# Patient Record
Sex: MALE | Race: BLACK OR AFRICAN AMERICAN | NOT HISPANIC OR LATINO | Employment: OTHER | URBAN - METROPOLITAN AREA
[De-identification: names, ages, dates, MRNs, and addresses within clinical notes are randomized per-mention and may not be internally consistent; named-entity substitution may affect disease eponyms.]

---

## 2017-05-06 ENCOUNTER — APPOINTMENT (OUTPATIENT)
Dept: GENERAL RADIOLOGY | Facility: HOSPITAL | Age: 62
End: 2017-05-06

## 2017-05-06 ENCOUNTER — APPOINTMENT (OUTPATIENT)
Dept: CT IMAGING | Facility: HOSPITAL | Age: 62
End: 2017-05-06

## 2017-05-06 ENCOUNTER — HOSPITAL ENCOUNTER (EMERGENCY)
Facility: HOSPITAL | Age: 62
Discharge: HOME | End: 2017-05-06
Attending: EMERGENCY MEDICINE | Admitting: EMERGENCY MEDICINE

## 2017-05-06 VITALS
HEART RATE: 87 BPM | RESPIRATION RATE: 20 BRPM | SYSTOLIC BLOOD PRESSURE: 174 MMHG | DIASTOLIC BLOOD PRESSURE: 92 MMHG | WEIGHT: 175 LBS | BODY MASS INDEX: 23.7 KG/M2 | OXYGEN SATURATION: 98 % | HEIGHT: 72 IN | TEMPERATURE: 98.9 F

## 2017-05-06 DIAGNOSIS — R07.9 ACUTE CHEST PAIN: Primary | ICD-10-CM

## 2017-05-06 DIAGNOSIS — R73.09 ELEVATED GLUCOSE: ICD-10-CM

## 2017-05-06 DIAGNOSIS — R10.12 ACUTE ABDOMINAL PAIN IN LEFT UPPER QUADRANT: ICD-10-CM

## 2017-05-06 DIAGNOSIS — F14.10 COCAINE ABUSE (HCC): ICD-10-CM

## 2017-05-06 DIAGNOSIS — I70.203 ATHEROSCLEROSIS OF ARTERY OF BOTH LOWER EXTREMITIES (HCC): ICD-10-CM

## 2017-05-06 LAB
ALBUMIN SERPL-MCNC: 4.3 G/DL (ref 3.2–4.8)
ALBUMIN/GLOB SERPL: 1.3 G/DL (ref 1.5–2.5)
ALP SERPL-CCNC: 86 U/L (ref 25–100)
ALT SERPL W P-5'-P-CCNC: 19 U/L (ref 7–40)
AMPHET+METHAMPHET UR QL: NEGATIVE
AMPHETAMINES UR QL: NEGATIVE
ANION GAP SERPL CALCULATED.3IONS-SCNC: 5 MMOL/L (ref 3–11)
AST SERPL-CCNC: 13 U/L (ref 0–33)
BACTERIA UR QL AUTO: ABNORMAL /HPF
BARBITURATES UR QL SCN: NEGATIVE
BASOPHILS # BLD AUTO: 0.02 10*3/MM3 (ref 0–0.2)
BASOPHILS NFR BLD AUTO: 0.2 % (ref 0–1)
BENZODIAZ UR QL SCN: NEGATIVE
BILIRUB SERPL-MCNC: 0.6 MG/DL (ref 0.3–1.2)
BILIRUB UR QL STRIP: ABNORMAL
BNP SERPL-MCNC: 13 PG/ML (ref 0–100)
BUN BLD-MCNC: 26 MG/DL (ref 9–23)
BUN/CREAT SERPL: 20 (ref 7–25)
BUPRENORPHINE SERPL-MCNC: NEGATIVE NG/ML
CALCIUM SPEC-SCNC: 10.7 MG/DL (ref 8.7–10.4)
CANNABINOIDS SERPL QL: POSITIVE
CHLORIDE SERPL-SCNC: 98 MMOL/L (ref 99–109)
CLARITY UR: ABNORMAL
CO2 SERPL-SCNC: 33 MMOL/L (ref 20–31)
COCAINE UR QL: POSITIVE
COLOR UR: ABNORMAL
CREAT BLD-MCNC: 1.3 MG/DL (ref 0.6–1.3)
D DIMER PPP FEU-MCNC: <0.19 MG/L (FEU) (ref 0–0.5)
DEPRECATED RDW RBC AUTO: 43.4 FL (ref 37–54)
EOSINOPHIL # BLD AUTO: 0.02 10*3/MM3 (ref 0.1–0.3)
EOSINOPHIL NFR BLD AUTO: 0.2 % (ref 0–3)
ERYTHROCYTE [DISTWIDTH] IN BLOOD BY AUTOMATED COUNT: 12.7 % (ref 11.3–14.5)
GFR SERPL CREATININE-BSD FRML MDRD: 68 ML/MIN/1.73
GLOBULIN UR ELPH-MCNC: 3.4 GM/DL
GLUCOSE BLD-MCNC: 295 MG/DL (ref 70–100)
GLUCOSE UR STRIP-MCNC: ABNORMAL MG/DL
HCT VFR BLD AUTO: 47.4 % (ref 38.9–50.9)
HGB BLD-MCNC: 16.2 G/DL (ref 13.1–17.5)
HGB UR QL STRIP.AUTO: NEGATIVE
HOLD SPECIMEN: NORMAL
HOLD SPECIMEN: NORMAL
HYALINE CASTS UR QL AUTO: ABNORMAL /LPF
IMM GRANULOCYTES # BLD: 0.01 10*3/MM3 (ref 0–0.03)
IMM GRANULOCYTES NFR BLD: 0.1 % (ref 0–0.6)
KETONES UR QL STRIP: ABNORMAL
LEUKOCYTE ESTERASE UR QL STRIP.AUTO: NEGATIVE
LIPASE SERPL-CCNC: 31 U/L (ref 6–51)
LYMPHOCYTES # BLD AUTO: 1.79 10*3/MM3 (ref 0.6–4.8)
LYMPHOCYTES NFR BLD AUTO: 18.9 % (ref 24–44)
MCH RBC QN AUTO: 31.6 PG (ref 27–31)
MCHC RBC AUTO-ENTMCNC: 34.2 G/DL (ref 32–36)
MCV RBC AUTO: 92.4 FL (ref 80–99)
METHADONE UR QL SCN: NEGATIVE
MONOCYTES # BLD AUTO: 0.62 10*3/MM3 (ref 0–1)
MONOCYTES NFR BLD AUTO: 6.6 % (ref 0–12)
MUCOUS THREADS URNS QL MICRO: ABNORMAL /HPF
NEUTROPHILS # BLD AUTO: 7 10*3/MM3 (ref 1.5–8.3)
NEUTROPHILS NFR BLD AUTO: 74 % (ref 41–71)
NITRITE UR QL STRIP: NEGATIVE
OPIATES UR QL: NEGATIVE
OXYCODONE UR QL SCN: NEGATIVE
PCP UR QL SCN: NEGATIVE
PH UR STRIP.AUTO: <=5 [PH] (ref 5–8)
PLATELET # BLD AUTO: 299 10*3/MM3 (ref 150–450)
PMV BLD AUTO: 10.6 FL (ref 6–12)
POTASSIUM BLD-SCNC: 4 MMOL/L (ref 3.5–5.5)
PROPOXYPH UR QL: NEGATIVE
PROT SERPL-MCNC: 7.7 G/DL (ref 5.7–8.2)
PROT UR QL STRIP: ABNORMAL
RBC # BLD AUTO: 5.13 10*6/MM3 (ref 4.2–5.76)
RBC # UR: ABNORMAL /HPF
REF LAB TEST METHOD: ABNORMAL
SODIUM BLD-SCNC: 136 MMOL/L (ref 132–146)
SP GR UR STRIP: 1.04 (ref 1–1.03)
SQUAMOUS #/AREA URNS HPF: ABNORMAL /HPF
TRICYCLICS UR QL SCN: NEGATIVE
TROPONIN I SERPL-MCNC: 0 NG/ML (ref 0–0.07)
TROPONIN I SERPL-MCNC: 0 NG/ML (ref 0–0.07)
UROBILINOGEN UR QL STRIP: ABNORMAL
WBC NRBC COR # BLD: 9.46 10*3/MM3 (ref 3.5–10.8)
WBC UR QL AUTO: ABNORMAL /HPF
WHOLE BLOOD HOLD SPECIMEN: NORMAL
WHOLE BLOOD HOLD SPECIMEN: NORMAL

## 2017-05-06 PROCEDURE — 0 IOPAMIDOL PER 1 ML: Performed by: EMERGENCY MEDICINE

## 2017-05-06 PROCEDURE — 71010 HC CHEST PA OR AP: CPT

## 2017-05-06 PROCEDURE — 71275 CT ANGIOGRAPHY CHEST: CPT

## 2017-05-06 PROCEDURE — 96376 TX/PRO/DX INJ SAME DRUG ADON: CPT

## 2017-05-06 PROCEDURE — 80053 COMPREHEN METABOLIC PANEL: CPT | Performed by: EMERGENCY MEDICINE

## 2017-05-06 PROCEDURE — 85025 COMPLETE CBC W/AUTO DIFF WBC: CPT | Performed by: EMERGENCY MEDICINE

## 2017-05-06 PROCEDURE — 84484 ASSAY OF TROPONIN QUANT: CPT

## 2017-05-06 PROCEDURE — 93005 ELECTROCARDIOGRAM TRACING: CPT | Performed by: EMERGENCY MEDICINE

## 2017-05-06 PROCEDURE — 83880 ASSAY OF NATRIURETIC PEPTIDE: CPT | Performed by: EMERGENCY MEDICINE

## 2017-05-06 PROCEDURE — 74177 CT ABD & PELVIS W/CONTRAST: CPT

## 2017-05-06 PROCEDURE — 81001 URINALYSIS AUTO W/SCOPE: CPT | Performed by: EMERGENCY MEDICINE

## 2017-05-06 PROCEDURE — 96374 THER/PROPH/DIAG INJ IV PUSH: CPT

## 2017-05-06 PROCEDURE — 85379 FIBRIN DEGRADATION QUANT: CPT | Performed by: EMERGENCY MEDICINE

## 2017-05-06 PROCEDURE — 93005 ELECTROCARDIOGRAM TRACING: CPT

## 2017-05-06 PROCEDURE — 0 DIATRIZOATE MEGLUMINE & SODIUM PER 1 ML: Performed by: EMERGENCY MEDICINE

## 2017-05-06 PROCEDURE — 87086 URINE CULTURE/COLONY COUNT: CPT | Performed by: EMERGENCY MEDICINE

## 2017-05-06 PROCEDURE — 80306 DRUG TEST PRSMV INSTRMNT: CPT | Performed by: EMERGENCY MEDICINE

## 2017-05-06 PROCEDURE — 25010000002 ONDANSETRON PER 1 MG: Performed by: EMERGENCY MEDICINE

## 2017-05-06 PROCEDURE — 96375 TX/PRO/DX INJ NEW DRUG ADDON: CPT

## 2017-05-06 PROCEDURE — 99284 EMERGENCY DEPT VISIT MOD MDM: CPT

## 2017-05-06 PROCEDURE — 25010000002 HYDROMORPHONE PER 4 MG: Performed by: EMERGENCY MEDICINE

## 2017-05-06 PROCEDURE — 83690 ASSAY OF LIPASE: CPT | Performed by: EMERGENCY MEDICINE

## 2017-05-06 RX ORDER — HYDROMORPHONE HYDROCHLORIDE 1 MG/ML
0.5 INJECTION, SOLUTION INTRAMUSCULAR; INTRAVENOUS; SUBCUTANEOUS
Status: DISCONTINUED | OUTPATIENT
Start: 2017-05-06 | End: 2017-05-06 | Stop reason: HOSPADM

## 2017-05-06 RX ORDER — HYDROMORPHONE HYDROCHLORIDE 1 MG/ML
0.5 INJECTION, SOLUTION INTRAMUSCULAR; INTRAVENOUS; SUBCUTANEOUS ONCE
Status: COMPLETED | OUTPATIENT
Start: 2017-05-06 | End: 2017-05-06

## 2017-05-06 RX ORDER — FAMOTIDINE 10 MG/ML
20 INJECTION, SOLUTION INTRAVENOUS ONCE
Status: COMPLETED | OUTPATIENT
Start: 2017-05-06 | End: 2017-05-06

## 2017-05-06 RX ORDER — ASPIRIN 81 MG/1
81 TABLET ORAL DAILY
Qty: 30 TABLET | Refills: 0 | Status: ON HOLD | OUTPATIENT
Start: 2017-05-06 | End: 2022-10-15

## 2017-05-06 RX ORDER — ONDANSETRON 2 MG/ML
4 INJECTION INTRAMUSCULAR; INTRAVENOUS ONCE
Status: COMPLETED | OUTPATIENT
Start: 2017-05-06 | End: 2017-05-06

## 2017-05-06 RX ORDER — ASPIRIN 81 MG/1
324 TABLET, CHEWABLE ORAL ONCE
Status: DISCONTINUED | OUTPATIENT
Start: 2017-05-06 | End: 2017-05-06 | Stop reason: HOSPADM

## 2017-05-06 RX ORDER — DICYCLOMINE HYDROCHLORIDE 10 MG/1
10 CAPSULE ORAL
Qty: 12 CAPSULE | Refills: 0 | Status: SHIPPED | OUTPATIENT
Start: 2017-05-06 | End: 2022-04-29

## 2017-05-06 RX ORDER — OMEPRAZOLE 20 MG/1
20 CAPSULE, DELAYED RELEASE ORAL DAILY
Qty: 30 CAPSULE | Refills: 0 | Status: SHIPPED | OUTPATIENT
Start: 2017-05-06 | End: 2022-04-29

## 2017-05-06 RX ORDER — PROMETHAZINE HYDROCHLORIDE 25 MG/1
25 TABLET ORAL EVERY 8 HOURS PRN
Qty: 15 TABLET | Refills: 0 | Status: SHIPPED | OUTPATIENT
Start: 2017-05-06 | End: 2022-04-29

## 2017-05-06 RX ORDER — SODIUM CHLORIDE 0.9 % (FLUSH) 0.9 %
10 SYRINGE (ML) INJECTION AS NEEDED
Status: DISCONTINUED | OUTPATIENT
Start: 2017-05-06 | End: 2017-05-06 | Stop reason: HOSPADM

## 2017-05-06 RX ADMIN — FAMOTIDINE 20 MG: 10 INJECTION, SOLUTION INTRAVENOUS at 17:06

## 2017-05-06 RX ADMIN — HYDROMORPHONE HYDROCHLORIDE 0.5 MG: 1 INJECTION, SOLUTION INTRAMUSCULAR; INTRAVENOUS; SUBCUTANEOUS at 17:57

## 2017-05-06 RX ADMIN — SODIUM CHLORIDE 2000 ML: 9 INJECTION, SOLUTION INTRAVENOUS at 17:06

## 2017-05-06 RX ADMIN — HYDROMORPHONE HYDROCHLORIDE 0.5 MG: 1 INJECTION, SOLUTION INTRAMUSCULAR; INTRAVENOUS; SUBCUTANEOUS at 17:08

## 2017-05-06 RX ADMIN — HYDROMORPHONE HYDROCHLORIDE 0.5 MG: 1 INJECTION, SOLUTION INTRAMUSCULAR; INTRAVENOUS; SUBCUTANEOUS at 20:53

## 2017-05-06 RX ADMIN — ONDANSETRON 4 MG: 2 INJECTION INTRAMUSCULAR; INTRAVENOUS at 17:08

## 2017-05-06 RX ADMIN — IOPAMIDOL 95 ML: 755 INJECTION, SOLUTION INTRAVENOUS at 19:21

## 2017-05-06 RX ADMIN — DIATRIZOATE MEGLUMINE AND DIATRIZOATE SODIUM 120 ML: 660; 100 LIQUID ORAL; RECTAL at 17:35

## 2017-05-08 LAB — BACTERIA SPEC AEROBE CULT: NORMAL

## 2017-05-10 ENCOUNTER — OFFICE VISIT (OUTPATIENT)
Dept: CARDIOLOGY | Facility: HOSPITAL | Age: 62
End: 2017-05-10

## 2017-05-10 ENCOUNTER — HOSPITAL ENCOUNTER (EMERGENCY)
Facility: HOSPITAL | Age: 62
Discharge: HOME | End: 2017-05-10
Attending: EMERGENCY MEDICINE | Admitting: EMERGENCY MEDICINE

## 2017-05-10 ENCOUNTER — APPOINTMENT (OUTPATIENT)
Dept: GENERAL RADIOLOGY | Facility: HOSPITAL | Age: 62
End: 2017-05-10

## 2017-05-10 VITALS
DIASTOLIC BLOOD PRESSURE: 62 MMHG | BODY MASS INDEX: 19.91 KG/M2 | HEART RATE: 89 BPM | TEMPERATURE: 98.1 F | OXYGEN SATURATION: 97 % | WEIGHT: 147 LBS | RESPIRATION RATE: 18 BRPM | HEIGHT: 72 IN | SYSTOLIC BLOOD PRESSURE: 196 MMHG

## 2017-05-10 VITALS
HEIGHT: 72 IN | HEART RATE: 104 BPM | RESPIRATION RATE: 20 BRPM | SYSTOLIC BLOOD PRESSURE: 74 MMHG | TEMPERATURE: 97 F | DIASTOLIC BLOOD PRESSURE: 53 MMHG | OXYGEN SATURATION: 97 % | BODY MASS INDEX: 19.91 KG/M2 | WEIGHT: 147 LBS

## 2017-05-10 DIAGNOSIS — R07.9 NONSPECIFIC CHEST PAIN: Primary | ICD-10-CM

## 2017-05-10 DIAGNOSIS — I95.1 ORTHOSTATIC HYPOTENSION: ICD-10-CM

## 2017-05-10 DIAGNOSIS — R07.2 PRECORDIAL PAIN: Primary | ICD-10-CM

## 2017-05-10 DIAGNOSIS — E86.0 DEHYDRATION: ICD-10-CM

## 2017-05-10 DIAGNOSIS — F14.90 COCAINE USE: ICD-10-CM

## 2017-05-10 DIAGNOSIS — IMO0001 ELEVATED BLOOD PRESSURE: ICD-10-CM

## 2017-05-10 DIAGNOSIS — IMO0002 UNCONTROLLED TYPE 2 DIABETES MELLITUS WITH MILD NONPROLIFERATIVE RETINOPATHY AND MACULAR EDEMA, WITHOUT LONG-TERM CURRENT USE OF INSULIN: ICD-10-CM

## 2017-05-10 PROBLEM — R63.4 WEIGHT LOSS, UNINTENTIONAL: Status: ACTIVE | Noted: 2017-05-10

## 2017-05-10 PROBLEM — R11.0 NAUSEA: Status: ACTIVE | Noted: 2017-05-10

## 2017-05-10 LAB
ALBUMIN SERPL-MCNC: 4.3 G/DL (ref 3.2–4.8)
ALBUMIN/GLOB SERPL: 1.3 G/DL (ref 1.5–2.5)
ALP SERPL-CCNC: 78 U/L (ref 25–100)
ALT SERPL W P-5'-P-CCNC: 16 U/L (ref 7–40)
AMPHET+METHAMPHET UR QL: NEGATIVE
AMPHETAMINES UR QL: NEGATIVE
ANION GAP SERPL CALCULATED.3IONS-SCNC: 2 MMOL/L (ref 3–11)
AST SERPL-CCNC: 12 U/L (ref 0–33)
BARBITURATES UR QL SCN: NEGATIVE
BASOPHILS # BLD AUTO: 0.02 10*3/MM3 (ref 0–0.2)
BASOPHILS NFR BLD AUTO: 0.2 % (ref 0–1)
BENZODIAZ UR QL SCN: NEGATIVE
BILIRUB SERPL-MCNC: 0.6 MG/DL (ref 0.3–1.2)
BNP SERPL-MCNC: 9 PG/ML (ref 0–100)
BUN BLD-MCNC: 21 MG/DL (ref 9–23)
BUN/CREAT SERPL: 16.2 (ref 7–25)
BUPRENORPHINE SERPL-MCNC: NEGATIVE NG/ML
CALCIUM SPEC-SCNC: 10 MG/DL (ref 8.7–10.4)
CANNABINOIDS SERPL QL: POSITIVE
CHLORIDE SERPL-SCNC: 98 MMOL/L (ref 99–109)
CO2 SERPL-SCNC: 36 MMOL/L (ref 20–31)
COCAINE UR QL: POSITIVE
CREAT BLD-MCNC: 1.3 MG/DL (ref 0.6–1.3)
DEPRECATED RDW RBC AUTO: 40.7 FL (ref 37–54)
EOSINOPHIL # BLD AUTO: 0.02 10*3/MM3 (ref 0.1–0.3)
EOSINOPHIL NFR BLD AUTO: 0.2 % (ref 0–3)
ERYTHROCYTE [DISTWIDTH] IN BLOOD BY AUTOMATED COUNT: 12.3 % (ref 11.3–14.5)
ETHANOL BLD-MCNC: <10 MG/DL (ref 0–10)
GFR SERPL CREATININE-BSD FRML MDRD: 68 ML/MIN/1.73
GLOBULIN UR ELPH-MCNC: 3.4 GM/DL
GLUCOSE BLD-MCNC: 284 MG/DL (ref 70–100)
HCT VFR BLD AUTO: 46.5 % (ref 38.9–50.9)
HGB BLD-MCNC: 16.3 G/DL (ref 13.1–17.5)
HOLD SPECIMEN: NORMAL
HOLD SPECIMEN: NORMAL
IMM GRANULOCYTES # BLD: 0.01 10*3/MM3 (ref 0–0.03)
IMM GRANULOCYTES NFR BLD: 0.1 % (ref 0–0.6)
LIPASE SERPL-CCNC: 28 U/L (ref 6–51)
LYMPHOCYTES # BLD AUTO: 1.85 10*3/MM3 (ref 0.6–4.8)
LYMPHOCYTES NFR BLD AUTO: 21.2 % (ref 24–44)
MCH RBC QN AUTO: 31.5 PG (ref 27–31)
MCHC RBC AUTO-ENTMCNC: 35.1 G/DL (ref 32–36)
MCV RBC AUTO: 89.9 FL (ref 80–99)
METHADONE UR QL SCN: NEGATIVE
MONOCYTES # BLD AUTO: 0.52 10*3/MM3 (ref 0–1)
MONOCYTES NFR BLD AUTO: 6 % (ref 0–12)
NEUTROPHILS # BLD AUTO: 6.31 10*3/MM3 (ref 1.5–8.3)
NEUTROPHILS NFR BLD AUTO: 72.3 % (ref 41–71)
OPIATES UR QL: NEGATIVE
OXYCODONE UR QL SCN: NEGATIVE
PCP UR QL SCN: NEGATIVE
PLATELET # BLD AUTO: 280 10*3/MM3 (ref 150–450)
PMV BLD AUTO: 10.5 FL (ref 6–12)
POTASSIUM BLD-SCNC: 4 MMOL/L (ref 3.5–5.5)
PROPOXYPH UR QL: NEGATIVE
PROT SERPL-MCNC: 7.7 G/DL (ref 5.7–8.2)
RBC # BLD AUTO: 5.17 10*6/MM3 (ref 4.2–5.76)
SODIUM BLD-SCNC: 136 MMOL/L (ref 132–146)
TRICYCLICS UR QL SCN: NEGATIVE
TROPONIN I SERPL-MCNC: 0 NG/ML (ref 0–0.07)
TROPONIN I SERPL-MCNC: 0.01 NG/ML (ref 0–0.07)
WBC NRBC COR # BLD: 8.73 10*3/MM3 (ref 3.5–10.8)
WHOLE BLOOD HOLD SPECIMEN: NORMAL
WHOLE BLOOD HOLD SPECIMEN: NORMAL

## 2017-05-10 PROCEDURE — 80053 COMPREHEN METABOLIC PANEL: CPT | Performed by: EMERGENCY MEDICINE

## 2017-05-10 PROCEDURE — 99284 EMERGENCY DEPT VISIT MOD MDM: CPT

## 2017-05-10 PROCEDURE — 96361 HYDRATE IV INFUSION ADD-ON: CPT

## 2017-05-10 PROCEDURE — 93005 ELECTROCARDIOGRAM TRACING: CPT | Performed by: EMERGENCY MEDICINE

## 2017-05-10 PROCEDURE — 85025 COMPLETE CBC W/AUTO DIFF WBC: CPT | Performed by: EMERGENCY MEDICINE

## 2017-05-10 PROCEDURE — 96374 THER/PROPH/DIAG INJ IV PUSH: CPT

## 2017-05-10 PROCEDURE — 83880 ASSAY OF NATRIURETIC PEPTIDE: CPT | Performed by: EMERGENCY MEDICINE

## 2017-05-10 PROCEDURE — 71020 HC CHEST PA AND LATERAL: CPT

## 2017-05-10 PROCEDURE — 80307 DRUG TEST PRSMV CHEM ANLYZR: CPT | Performed by: EMERGENCY MEDICINE

## 2017-05-10 PROCEDURE — 83690 ASSAY OF LIPASE: CPT | Performed by: EMERGENCY MEDICINE

## 2017-05-10 PROCEDURE — 80306 DRUG TEST PRSMV INSTRMNT: CPT | Performed by: EMERGENCY MEDICINE

## 2017-05-10 PROCEDURE — 99213 OFFICE O/P EST LOW 20 MIN: CPT | Performed by: NURSE PRACTITIONER

## 2017-05-10 PROCEDURE — 84484 ASSAY OF TROPONIN QUANT: CPT

## 2017-05-10 PROCEDURE — 93005 ELECTROCARDIOGRAM TRACING: CPT

## 2017-05-10 RX ORDER — ASPIRIN 81 MG/1
324 TABLET, CHEWABLE ORAL ONCE
Status: DISCONTINUED | OUTPATIENT
Start: 2017-05-10 | End: 2017-05-10 | Stop reason: HOSPADM

## 2017-05-10 RX ORDER — FAMOTIDINE 20 MG/1
20 TABLET, FILM COATED ORAL DAILY
Qty: 30 TABLET | Refills: 0 | Status: SHIPPED | OUTPATIENT
Start: 2017-05-10 | End: 2022-04-29

## 2017-05-10 RX ORDER — ONDANSETRON 4 MG/1
4 TABLET, ORALLY DISINTEGRATING ORAL EVERY 4 HOURS
Qty: 15 TABLET | Refills: 0 | Status: SHIPPED | OUTPATIENT
Start: 2017-05-10 | End: 2022-04-29

## 2017-05-10 RX ORDER — FAMOTIDINE 10 MG/ML
20 INJECTION, SOLUTION INTRAVENOUS ONCE
Status: COMPLETED | OUTPATIENT
Start: 2017-05-10 | End: 2017-05-10

## 2017-05-10 RX ORDER — SODIUM CHLORIDE 0.9 % (FLUSH) 0.9 %
10 SYRINGE (ML) INJECTION AS NEEDED
Status: DISCONTINUED | OUTPATIENT
Start: 2017-05-10 | End: 2017-05-10 | Stop reason: HOSPADM

## 2017-05-10 RX ORDER — ONDANSETRON 2 MG/ML
4 INJECTION INTRAMUSCULAR; INTRAVENOUS ONCE
Status: DISCONTINUED | OUTPATIENT
Start: 2017-05-10 | End: 2017-05-10 | Stop reason: HOSPADM

## 2017-05-10 RX ORDER — MAGNESIUM HYDROXIDE/ALUMINUM HYDROXICE/SIMETHICONE 120; 1200; 1200 MG/30ML; MG/30ML; MG/30ML
30 SUSPENSION ORAL ONCE
Status: COMPLETED | OUTPATIENT
Start: 2017-05-10 | End: 2017-05-10

## 2017-05-10 RX ADMIN — FAMOTIDINE 20 MG: 10 INJECTION, SOLUTION INTRAVENOUS at 13:35

## 2017-05-10 RX ADMIN — ALUMINUM HYDROXIDE, MAGNESIUM HYDROXIDE, AND SIMETHICONE 30 ML: 200; 200; 20 SUSPENSION ORAL at 13:35

## 2017-05-10 RX ADMIN — LIDOCAINE HYDROCHLORIDE 15 ML: 20 SOLUTION ORAL; TOPICAL at 13:35

## 2017-05-10 RX ADMIN — SODIUM CHLORIDE 1000 ML: 9 INJECTION, SOLUTION INTRAVENOUS at 16:29

## 2017-05-23 ENCOUNTER — PROCEDURE VISIT (OUTPATIENT)
Dept: CARDIOLOGY | Facility: HOSPITAL | Age: 62
End: 2017-05-23

## 2017-05-23 ENCOUNTER — OFFICE VISIT (OUTPATIENT)
Dept: CARDIOLOGY | Facility: HOSPITAL | Age: 62
End: 2017-05-23

## 2017-05-23 ENCOUNTER — HOSPITAL ENCOUNTER (OUTPATIENT)
Dept: CARDIOLOGY | Facility: HOSPITAL | Age: 62
Discharge: HOME | End: 2017-05-23
Admitting: NURSE PRACTITIONER

## 2017-05-23 VITALS
RESPIRATION RATE: 18 BRPM | HEIGHT: 72 IN | BODY MASS INDEX: 21.26 KG/M2 | SYSTOLIC BLOOD PRESSURE: 147 MMHG | TEMPERATURE: 97.7 F | HEART RATE: 77 BPM | WEIGHT: 157 LBS | DIASTOLIC BLOOD PRESSURE: 86 MMHG | OXYGEN SATURATION: 100 %

## 2017-05-23 DIAGNOSIS — R07.89 CHEST PAIN, ATYPICAL: Primary | ICD-10-CM

## 2017-05-23 DIAGNOSIS — R07.89 CHEST PAIN, ATYPICAL: ICD-10-CM

## 2017-05-23 DIAGNOSIS — R06.02 SHORTNESS OF BREATH: ICD-10-CM

## 2017-05-23 LAB
BH CV STRESS BP STAGE 1: NORMAL
BH CV STRESS BP STAGE 2: NORMAL
BH CV STRESS DURATION MIN STAGE 1: 3
BH CV STRESS DURATION MIN STAGE 2: 3
BH CV STRESS DURATION SEC STAGE 1: 0
BH CV STRESS DURATION SEC STAGE 2: 0
BH CV STRESS GRADE STAGE 1: 10
BH CV STRESS GRADE STAGE 2: 12
BH CV STRESS HR STAGE 1: 114
BH CV STRESS HR STAGE 2: 141
BH CV STRESS METS STAGE 1: 5
BH CV STRESS METS STAGE 2: 7.5
BH CV STRESS PROTOCOL 1: NORMAL
BH CV STRESS RECOVERY BP: NORMAL MMHG
BH CV STRESS RECOVERY HR: 96 BPM
BH CV STRESS SPEED STAGE 1: 1.7
BH CV STRESS SPEED STAGE 2: 2.5
BH CV STRESS STAGE 1: 1
BH CV STRESS STAGE 2: 2
MAXIMAL PREDICTED HEART RATE: 161 BPM
PERCENT MAX PREDICTED HR: 87.58 %
STRESS BASELINE BP: NORMAL MMHG
STRESS BASELINE HR: 73 BPM
STRESS PERCENT HR: 103 %
STRESS POST ESTIMATED WORKLOAD: 7 METS
STRESS POST EXERCISE DUR MIN: 6 MIN
STRESS POST EXERCISE DUR SEC: 0 SEC
STRESS POST PEAK BP: NORMAL MMHG
STRESS POST PEAK HR: 141 BPM
STRESS TARGET HR: 137 BPM

## 2017-05-23 PROCEDURE — 93017 CV STRESS TEST TRACING ONLY: CPT

## 2017-05-23 PROCEDURE — 93005 ELECTROCARDIOGRAM TRACING: CPT

## 2017-05-23 PROCEDURE — 99213 OFFICE O/P EST LOW 20 MIN: CPT | Performed by: NURSE PRACTITIONER

## 2017-05-23 PROCEDURE — 93018 CV STRESS TEST I&R ONLY: CPT | Performed by: INTERNAL MEDICINE

## 2022-04-29 ENCOUNTER — HOSPITAL ENCOUNTER (EMERGENCY)
Facility: HOSPITAL | Age: 67
Discharge: HOME | End: 2022-04-29
Attending: EMERGENCY MEDICINE | Admitting: EMERGENCY MEDICINE

## 2022-04-29 ENCOUNTER — APPOINTMENT (OUTPATIENT)
Dept: GENERAL RADIOLOGY | Facility: HOSPITAL | Age: 67
End: 2022-04-29

## 2022-04-29 ENCOUNTER — APPOINTMENT (OUTPATIENT)
Dept: MRI IMAGING | Facility: HOSPITAL | Age: 67
End: 2022-04-29

## 2022-04-29 VITALS
OXYGEN SATURATION: 99 % | RESPIRATION RATE: 16 BRPM | BODY MASS INDEX: 23.84 KG/M2 | DIASTOLIC BLOOD PRESSURE: 75 MMHG | SYSTOLIC BLOOD PRESSURE: 142 MMHG | TEMPERATURE: 97.9 F | HEIGHT: 72 IN | WEIGHT: 176 LBS | HEART RATE: 82 BPM

## 2022-04-29 DIAGNOSIS — S06.0X0A CONCUSSION WITHOUT LOSS OF CONSCIOUSNESS, INITIAL ENCOUNTER: Primary | ICD-10-CM

## 2022-04-29 DIAGNOSIS — R19.7 DIARRHEA, UNSPECIFIED TYPE: ICD-10-CM

## 2022-04-29 DIAGNOSIS — S09.90XA MINOR HEAD INJURY WITHOUT LOSS OF CONSCIOUSNESS, INITIAL ENCOUNTER: ICD-10-CM

## 2022-04-29 LAB
ALBUMIN SERPL-MCNC: 3.9 G/DL (ref 3.5–5.2)
ALBUMIN/GLOB SERPL: 1.1 G/DL
ALP SERPL-CCNC: 99 U/L (ref 39–117)
ALT SERPL W P-5'-P-CCNC: 21 U/L (ref 1–41)
ANION GAP SERPL CALCULATED.3IONS-SCNC: 9 MMOL/L (ref 5–15)
AST SERPL-CCNC: 19 U/L (ref 1–40)
BASOPHILS # BLD AUTO: 0.04 10*3/MM3 (ref 0–0.2)
BASOPHILS NFR BLD AUTO: 0.5 % (ref 0–1.5)
BILIRUB SERPL-MCNC: <0.2 MG/DL (ref 0–1.2)
BILIRUB UR QL STRIP: NEGATIVE
BUN SERPL-MCNC: 23 MG/DL (ref 8–23)
BUN/CREAT SERPL: 23.7 (ref 7–25)
CALCIUM SPEC-SCNC: 9.5 MG/DL (ref 8.6–10.5)
CHLORIDE SERPL-SCNC: 103 MMOL/L (ref 98–107)
CLARITY UR: CLEAR
CO2 SERPL-SCNC: 27 MMOL/L (ref 22–29)
COLOR UR: YELLOW
CREAT SERPL-MCNC: 0.97 MG/DL (ref 0.76–1.27)
DEPRECATED RDW RBC AUTO: 41.3 FL (ref 37–54)
EGFRCR SERPLBLD CKD-EPI 2021: 87.2 ML/MIN/1.73
EOSINOPHIL # BLD AUTO: 0.2 10*3/MM3 (ref 0–0.4)
EOSINOPHIL NFR BLD AUTO: 2.7 % (ref 0.3–6.2)
ERYTHROCYTE [DISTWIDTH] IN BLOOD BY AUTOMATED COUNT: 13 % (ref 12.3–15.4)
GLOBULIN UR ELPH-MCNC: 3.6 GM/DL
GLUCOSE SERPL-MCNC: 106 MG/DL (ref 65–99)
GLUCOSE UR STRIP-MCNC: NEGATIVE MG/DL
HCT VFR BLD AUTO: 35.2 % (ref 37.5–51)
HGB BLD-MCNC: 11.7 G/DL (ref 13–17.7)
HGB UR QL STRIP.AUTO: NEGATIVE
HOLD SPECIMEN: NORMAL
IMM GRANULOCYTES # BLD AUTO: 0.02 10*3/MM3 (ref 0–0.05)
IMM GRANULOCYTES NFR BLD AUTO: 0.3 % (ref 0–0.5)
KETONES UR QL STRIP: NEGATIVE
LEUKOCYTE ESTERASE UR QL STRIP.AUTO: NEGATIVE
LYMPHOCYTES # BLD AUTO: 2.65 10*3/MM3 (ref 0.7–3.1)
LYMPHOCYTES NFR BLD AUTO: 36.3 % (ref 19.6–45.3)
MAGNESIUM SERPL-MCNC: 2 MG/DL (ref 1.6–2.4)
MCH RBC QN AUTO: 28.9 PG (ref 26.6–33)
MCHC RBC AUTO-ENTMCNC: 33.2 G/DL (ref 31.5–35.7)
MCV RBC AUTO: 86.9 FL (ref 79–97)
MONOCYTES # BLD AUTO: 0.51 10*3/MM3 (ref 0.1–0.9)
MONOCYTES NFR BLD AUTO: 7 % (ref 5–12)
NEUTROPHILS NFR BLD AUTO: 3.88 10*3/MM3 (ref 1.7–7)
NEUTROPHILS NFR BLD AUTO: 53.2 % (ref 42.7–76)
NITRITE UR QL STRIP: NEGATIVE
NRBC BLD AUTO-RTO: 0 /100 WBC (ref 0–0.2)
PH UR STRIP.AUTO: <=5 [PH] (ref 5–8)
PLATELET # BLD AUTO: 264 10*3/MM3 (ref 140–450)
PMV BLD AUTO: 10.5 FL (ref 6–12)
POTASSIUM SERPL-SCNC: 4.2 MMOL/L (ref 3.5–5.2)
PROT SERPL-MCNC: 7.5 G/DL (ref 6–8.5)
PROT UR QL STRIP: ABNORMAL
QT INTERVAL: 328 MS
QTC INTERVAL: 396 MS
RBC # BLD AUTO: 4.05 10*6/MM3 (ref 4.14–5.8)
SODIUM SERPL-SCNC: 139 MMOL/L (ref 136–145)
SP GR UR STRIP: 1.02 (ref 1–1.03)
TROPONIN T SERPL-MCNC: 0.02 NG/ML (ref 0–0.03)
UROBILINOGEN UR QL STRIP: ABNORMAL
WBC NRBC COR # BLD: 7.3 10*3/MM3 (ref 3.4–10.8)
WHOLE BLOOD HOLD SPECIMEN: NORMAL
WHOLE BLOOD HOLD SPECIMEN: NORMAL

## 2022-04-29 PROCEDURE — 93005 ELECTROCARDIOGRAM TRACING: CPT | Performed by: EMERGENCY MEDICINE

## 2022-04-29 PROCEDURE — 93005 ELECTROCARDIOGRAM TRACING: CPT

## 2022-04-29 PROCEDURE — 84484 ASSAY OF TROPONIN QUANT: CPT

## 2022-04-29 PROCEDURE — 83735 ASSAY OF MAGNESIUM: CPT

## 2022-04-29 PROCEDURE — 70551 MRI BRAIN STEM W/O DYE: CPT

## 2022-04-29 PROCEDURE — 80053 COMPREHEN METABOLIC PANEL: CPT

## 2022-04-29 PROCEDURE — 36415 COLL VENOUS BLD VENIPUNCTURE: CPT

## 2022-04-29 PROCEDURE — 85025 COMPLETE CBC W/AUTO DIFF WBC: CPT

## 2022-04-29 PROCEDURE — 71045 X-RAY EXAM CHEST 1 VIEW: CPT

## 2022-04-29 PROCEDURE — 99284 EMERGENCY DEPT VISIT MOD MDM: CPT

## 2022-04-29 PROCEDURE — 70200 X-RAY EXAM OF EYE SOCKETS: CPT

## 2022-04-29 PROCEDURE — 81003 URINALYSIS AUTO W/O SCOPE: CPT | Performed by: EMERGENCY MEDICINE

## 2022-04-29 RX ORDER — SODIUM CHLORIDE 0.9 % (FLUSH) 0.9 %
10 SYRINGE (ML) INJECTION AS NEEDED
Status: DISCONTINUED | OUTPATIENT
Start: 2022-04-29 | End: 2022-04-29 | Stop reason: HOSPADM

## 2022-04-29 RX ORDER — LISINOPRIL AND HYDROCHLOROTHIAZIDE 20; 12.5 MG/1; MG/1
2 TABLET ORAL DAILY
Status: ON HOLD | COMMUNITY
End: 2022-10-15

## 2022-04-29 RX ADMIN — SODIUM CHLORIDE 500 ML: 9 INJECTION, SOLUTION INTRAVENOUS at 15:51

## 2022-09-17 ENCOUNTER — APPOINTMENT (OUTPATIENT)
Dept: GENERAL RADIOLOGY | Age: 67
End: 2022-09-17
Payer: MEDICARE

## 2022-09-17 ENCOUNTER — HOSPITAL ENCOUNTER (EMERGENCY)
Age: 67
Discharge: OTHER INSTITUTION | End: 2022-09-17
Attending: STUDENT IN AN ORGANIZED HEALTH CARE EDUCATION/TRAINING PROGRAM
Payer: MEDICARE

## 2022-09-17 VITALS
OXYGEN SATURATION: 100 % | TEMPERATURE: 98.8 F | DIASTOLIC BLOOD PRESSURE: 77 MMHG | RESPIRATION RATE: 16 BRPM | SYSTOLIC BLOOD PRESSURE: 159 MMHG | HEART RATE: 72 BPM

## 2022-09-17 DIAGNOSIS — S99.922A TOE INJURY, LEFT, INITIAL ENCOUNTER: Primary | ICD-10-CM

## 2022-09-17 PROCEDURE — 90715 TDAP VACCINE 7 YRS/> IM: CPT | Performed by: STUDENT IN AN ORGANIZED HEALTH CARE EDUCATION/TRAINING PROGRAM

## 2022-09-17 PROCEDURE — 90471 IMMUNIZATION ADMIN: CPT | Performed by: STUDENT IN AN ORGANIZED HEALTH CARE EDUCATION/TRAINING PROGRAM

## 2022-09-17 PROCEDURE — 96365 THER/PROPH/DIAG IV INF INIT: CPT

## 2022-09-17 PROCEDURE — 2580000003 HC RX 258: Performed by: STUDENT IN AN ORGANIZED HEALTH CARE EDUCATION/TRAINING PROGRAM

## 2022-09-17 PROCEDURE — 99284 EMERGENCY DEPT VISIT MOD MDM: CPT

## 2022-09-17 PROCEDURE — 2500000003 HC RX 250 WO HCPCS

## 2022-09-17 PROCEDURE — 6360000002 HC RX W HCPCS: Performed by: STUDENT IN AN ORGANIZED HEALTH CARE EDUCATION/TRAINING PROGRAM

## 2022-09-17 PROCEDURE — 96372 THER/PROPH/DIAG INJ SC/IM: CPT

## 2022-09-17 PROCEDURE — 73620 X-RAY EXAM OF FOOT: CPT

## 2022-09-17 RX ORDER — HYDROCODONE BITARTRATE AND ACETAMINOPHEN 5; 325 MG/1; MG/1
1 TABLET ORAL EVERY 6 HOURS PRN
Status: CANCELLED | OUTPATIENT
Start: 2022-09-17 | End: 2022-09-24

## 2022-09-17 RX ORDER — CEPHALEXIN 500 MG/1
500 CAPSULE ORAL EVERY 6 HOURS SCHEDULED
Status: CANCELLED | OUTPATIENT
Start: 2022-09-18 | End: 2022-09-24

## 2022-09-17 RX ORDER — CEPHALEXIN 500 MG/1
500 CAPSULE ORAL 4 TIMES DAILY
Qty: 28 CAPSULE | Refills: 0 | Status: SHIPPED | OUTPATIENT
Start: 2022-09-17 | End: 2022-09-24

## 2022-09-17 RX ADMIN — TETANUS TOXOID, REDUCED DIPHTHERIA TOXOID AND ACELLULAR PERTUSSIS VACCINE, ADSORBED 0.5 ML: 5; 2.5; 8; 8; 2.5 SUSPENSION INTRAMUSCULAR at 21:35

## 2022-09-17 RX ADMIN — CEFAZOLIN 2000 MG: 2 INJECTION, POWDER, FOR SOLUTION INTRAMUSCULAR; INTRAVENOUS at 21:34

## 2022-09-17 RX ADMIN — LIDOCAINE HYDROCHLORIDE 5 ML: 10 INJECTION, SOLUTION EPIDURAL; INFILTRATION; INTRACAUDAL; PERINEURAL at 21:11

## 2022-09-17 ASSESSMENT — ENCOUNTER SYMPTOMS
RHINORRHEA: 0
COUGH: 0
CONSTIPATION: 0
CHOKING: 0
CHEST TIGHTNESS: 0
SHORTNESS OF BREATH: 0
BACK PAIN: 0
SINUS PAIN: 0
SINUS PRESSURE: 0
ABDOMINAL PAIN: 0
VOMITING: 0
FACIAL SWELLING: 0
SORE THROAT: 0
NAUSEA: 0

## 2022-09-17 NOTE — ED PROVIDER NOTES
1 [facility]  EMERGENCY DEPARTMENT ENCOUNTER          [facility] RESIDENT NOTE       Date of evaluation: 9/17/2022    Chief Complaint     Toe Injury (Pt brought in by EMS from [facility] for injury to toe on left foot. Per EMS, pt is at mental baseline and is not sure what he injured toe on but has no other complaints. Pt sent by nursing home staff for possible stitches)      History of Present Illness     Shantel Bauer is a 74 y.o. male who presents from [facility] for left total injury. Per patient he was running across the street where he fell flops when his left foot got stuck around the curb and injured his left toe. He went back to the facility where the staff noted him to be bleeding and sent him to the ED for further evaluation and management. Patient denies any head trauma, loss of consciousness, nausea/vomiting, lightheadedness or dizziness. Patient is a from [facility] facility, I tried to reach out to facility but there was no answer on multiple attempts. I was unable to find any significant past medical history or any previous charts in the EMR. Review of Systems     Review of Systems   Constitutional:  Negative for activity change, appetite change, chills, diaphoresis, fatigue and fever. HENT:  Negative for facial swelling, hearing loss, postnasal drip, rhinorrhea, sinus pressure, sinus pain, sneezing and sore throat. Respiratory:  Negative for cough, choking, chest tightness and shortness of breath. Gastrointestinal:  Negative for abdominal pain, constipation, nausea and vomiting. Genitourinary:  Negative for flank pain, frequency, genital sores, hematuria, penile discharge and penile swelling. Musculoskeletal:  Negative for arthralgias, back pain, gait problem, joint swelling, myalgias and neck pain. Neurological:  Negative for tremors, seizures, speech difficulty, weakness, light-headedness, numbness and headaches.      Past Medical, Surgical, Family, and Social History     He has no past medical history on file. He has no past surgical history on file. His family history is not on file. He     Medications     Discharge Medication List as of 9/17/2022 10:28 PM          Allergies     He has No Known Allergies. Physical Exam     INITIAL VITALS: BP: (!) 146/62, Temp: 98.8 °F (37.1 °C), Heart Rate: 74, Resp: 16, SpO2: 95 %   Physical Exam    Diagnostic Results     EKG   Not performed. RADIOLOGY:  XR FOOT LEFT (2 VIEWS)   Final Result      1. Small intra-articular vertical fracture through the base of the great toe proximal phalanx. LABS:   No results found for this visit on 09/17/22. ED BEDSIDE ULTRASOUND:  No results found. RECENT VITALS:  BP: (!) 159/77, Temp: 98.8 °F (37.1 °C),Heart Rate: 72, Resp: 16, SpO2: 100 %     Procedures     Sutures per podiatry, please see podiatry residents note. ED Course     Nursing Notes, Past Medical Hx, Past Surgical Hx, Social Hx, Allergies, and FamilyHx were reviewed. The patient was giventhe following medications:  Orders Placed This Encounter   Medications    Tetanus-Diphth-Acell Pertussis (BOOSTRIX) injection 0.5 mL    lidocaine 1 % injection 5 mL    ceFAZolin (ANCEF) 2,000 mg in sodium chloride 0.9 % 50 mL IVPB (mini-bag)     Order Specific Question:   Antimicrobial Indications     Answer:   Skin and Soft Tissue Infection    cephALEXin (KEFLEX) 500 MG capsule     Sig: Take 1 capsule by mouth 4 times daily for 7 days     Dispense:  28 capsule     Refill:  0       CONSULTS:  IP CONSULT TO [address] / ASSESSMENT / Roula Martinez is a 74 y.o. male with no significant past medical history in EMR presented from [facility] with a toe injury. Patient reported he was running fast with his flip-flops on and got his left foot stuck with his flip-flop and injured his toe. Patient was hemodynamically stable in the ED.   On my assessment there is significant injury of the space between the first and second toes on his left foot as well as the injury to L big toe. XR L foot revealed small intra-articular ventricular fracture through the base of the greater toe proximal phalanx. We consulted podiatry. One of the podiatry residents came down to the ED and sutured patients L foot injury. I gave a dose of ancef 2gm. Also ordered a hard sole shoe and discharged patient with a 7 day prescription of keflex. Pt was given written instruction per podiatry regarding the care of injured site as well as given written instructions to follow up with a podiatry physician outpatient and return to the ED if worsening symptoms of pain, bleeding, suture tear. This patient was also evaluated by the attending physician. All care plans were discussed and agreed upon. Clinical Impression     1.  Toe injury, left, initial encounter        Disposition     PATIENT REFERRED TO:  Juvenal Fofana DPM  [address]  [address] [facility] [address]  [phone]    In 1 week  For suture removal, For wound re-check    DISCHARGE MEDICATIONS:  Discharge Medication List as of 9/17/2022 10:28 PM        START taking these medications    Details   cephALEXin (KEFLEX) 500 MG capsule Take 1 capsule by mouth 4 times daily for 7 days, Disp-28 capsule, R-0Print             DISPOSITION Decision To Discharge 09/17/2022 10:35:36 PM       Jose Eduardo Weber MD  Resident  09/17/22 9930

## 2022-09-18 NOTE — CONSULTS
Department of Podiatry Consult Note  Resident       Reason for Consult: Laceration left lower extremity  Requesting Physician: Amparo Alston MD    CHIEF COMPLAINT: Laceration left lower extremity    HISTORY OF PRESENT ILLNESS:                The patient is a 74 y.o. male with significant past medical history as listed below. Podiatry was consulted for laceration left lower extremity to the first webspace. Patient relates that he tripped while walking today around noon. Patient denies pain to the left lower extremity. Patient did not notice injury initially, facility noticed injury and patient was brought by EMS. Patient has a history of diabetic neuropathy relates numbness to the bilateral lower extremity. Patient does not recall last tetanus shot. patient denies fever, chills, nausea, vomiting, shortness of breath, chest pain. Patient has no other pedal complaints at this time. Past Medical History:    No past medical history on file. Past Surgical History:    No past surgical history on file. Allergies:   Patient has no known allergies. Medications:   Home Meds  No current facility-administered medications on file prior to encounter. No current outpatient medications on file prior to encounter. Current Meds  No current facility-administered medications for this encounter. Family History:   No family history on file. Social History:   TOBACCO:   has no history on file for tobacco use. ETOH:   has no history on file for alcohol use. DRUGS:   has no history on file for drug use. REVIEW OF SYSTEMS:    As above. PHYSICAL EXAM:      Vitals:    BP (!) 146/62   Pulse 74   Temp 98.8 °F (37.1 °C) (Oral)   SpO2 95%     LABS:   No results for input(s): WBC, HGB, HCT, PLT in the last 72 hours. No results for input(s): NA, K, CL, CO2, PHOS, BUN, CREATININE, CA in the last 72 hours. No results for input(s): PROT, INR, APTT in the last 72 hours.       VASCULAR: DP and PT pulses faintly palpable 1/4, upon hand-held Doppler examination DP and PT pulses were noted to be biphasic bilaterally. CFT is brisk to the digits of the foot b/l. Skin temperature is warm to cool from proximal to distal with no focal calor noted. Moderate nonpitting edema noted dorsal aspect left lower extremity localized to the first MPJ and second MPJ left lower extremity. No pain with calf compression b/l. NEUROLOGIC: Gross and epicritic sensation is diminished b/l. Protective sensation is absent at all pedal sites b/l. DERMATOLOGIC: Nails 1-5 b/l are within normal limits of length, thickness, and color. Webspaces 1-4 right are clean, dry, and intact. Webspaces 2 through 4 are clean dry intact left lower extremity, first webspace with laceration. No hyperkeratosis noted. . No subcutaneous nodules, rashes, or other skin lesions noted. Right lower extremity:  Verbal consent obtained prior to photos taken        Full-thickness laceration extending from the dorsal MPJ through the first webspace plantarly to the central aspect of the sulcus first digit left lower extremity. Diffuse ecchymosis noted dorsal aspect to the medial interphalangeal joint hallux left lower extremity and PIPJ second digit dorsally left lower extremity. Sanguinous drainage noted to laceration left lower extremity without fluctuance, crepitus, malodor. No purulence noted. Lesion does not tunnel, track but does probe to bone. Moderate erythema noted plantar aspect of left first and second digit. Right lower extremity is a closed soft tissue envelope without evidence of bone laceration or infection. .    MUSCULOSKELETAL: Muscle strength is 5/5 for all pedal groups tested. No pain with palpation of the foot or ankle b/l. Ankle joint ROM is decreased in dorsiflexion with the knee extended. No obvious biomechanical abnormalities. No pain on palpation to the first and second left digits.   Range of motion appreciated to all digits at the metatarsal phalangeal joints left lower extremity. Imaging:    Narrative   4 VIEWS OF THE LEFT FOOT       HISTORY: Toe injury       FINDINGS: There is intra-articular fracture through the lateral base of the great toe proximal phalanx. Joint spaces are maintained. Remaining bones of the left foot are intact. Soft tissue swelling of the foot seen. Impression       1. Small intra-articular vertical fracture through the base of the great toe proximal phalanx. IMPRESSION/RECOMMENDATIONS:    -Laceration first webspace left lower extremity  -Intra-articular fracture lateral base of first proximal phalanx left lower extremity    -Patient examined and evaluated at the bedside   -Hypertensive otherwise VSS. -Labs pending  -Imaging reviewed and impressions noted above  -After obtaining verbal consent, 10 cc of 1% lidocaine plain utilized in a [place] block fashion. The left lower extremity was then prepped in a sterile fashion. Using copious amounts of saline the laceration was irrigated thoroughly. Utilizing 3-0 nylon in was reapproximated using simple interrupted suture technique. -Dressing was applied to the left lower extremity consisting of Adaptic, Betadine, gauze, Kerlix, Ace.  -Surgical shoe ordered and to be worn at all times.  -Patient may partial weight-bear to the heel only in surgical shoe left lower extremity, patient may weight-bear as tolerated to the right lower extremity.  -2 g Ancef given in ED  -Patient to be sent home with 7-day course of p.o. Keflex.  -Tetanus booster administered in the ED.   -Patient to follow-up with Dr. Deshawn Cano in his private office within 1 week of discharge      DISPO: Laceration first webspace left lower extremity. Intra-articular fracture base of proximal phalanx left lower extremity. IV Ancef. Outpatient p.o. Keflex.   No surgical intervention warranted at this time, patient okay to discharge from podiatric standpoint pending medical clearance.     - The patient will be staffed with Dr. Patsy Ace, Elena Torres DPM   Podiatric Resident PGY1  Pager [phone] or Abdi  9/17/2022, 10:23 PM

## 2022-09-18 NOTE — DISCHARGE INSTRUCTIONS
[facility] [address]   [address]  [phone]    Dr. Michael Coulter Operative Instructions    1. Have Prescriptions filled and take as directed. All medications should be taken with food or milk. 2.  Keep foot elevated six inches above the level of the heart. Support feet, legs, and knees with pillows. 3.  KEEP FOOT ELEVATED AS MUCH AS POSSIBLE UNTIL YOUR NEXT VISIT    4. Place an ice pack on the bandaged site for 15 minutes every hour while awake    5. Keep dressing clean, dry, and intact. DO NOT REMOVE DRESSING. CALL THE OFFICE IF BANDAGE COMES OFF. 6.  For the first 7 days after surgery, take temperature by mouth three times a day. Call the office if greater than 101F.    7.  Ambulate with partial weight bearing to the left lower extremity with surgical shoe or crutches / walker. 8.  All instructions are to be followed until otherwise instructed by your surgeon. 9.  Call our office if you have any concerns or questions which arise. Our phones are answered 24 hours a day. [phone]    13. Your first post-operative appointment is scheduled, call the office for appointment date and time if not known prior to today.

## 2022-09-18 NOTE — ED PROVIDER NOTES
ED Attending Attestation Note     Date of evaluation: 9/17/2022    This patient was seen by the resident. I have seen and examined the patient, agree with the workup, evaluation, management and diagnosis. The care plan has been discussed. My assessment reveals a 44-year-old gentleman with no reported medical history presenting by EMS for left foot injury. Per patient, he was running across the street from a friend's house in flip-flops when he struck his left foot on a curb resulting in pain at his left first toe. Patient went back to his living facility and was noted by staff to have bleeding and was sent to the ER for further evaluation. Patient denies falling, hitting his head or having pain anywhere else. Patient is unsure if his tetanus shot is up-to-date. Exam with no focal neurologic deficits on exam with no external signs of trauma to the head, neck, chest, abdomen or pelvis. Exam of left lower extremity showed no proximal fibula tenderness, no tenderness over bilateral malleoli or calcaneus, no tenderness at base of first or fifth metatarsal with linear laceration between the first and second digit with superficial abrasions over dorsal aspect of first toe with surrounding contusion. Patient is able to sense light touch in all nerve distributions, wiggle toes and has good cap refill in all toes with strong DP pulse. Tdap was updated and patient was given ancef. X-ray showed small intra-articular fracture through base of first toe. Podiatry was consulted and repaired laceration at bedside with recommendation for outpatient follow-up and antibiotics. After laceration was repaired at bedside given patient had stable vitals, reassuring exam and no other findings of trauma on exam, he was discharged home with prescription for Keflex, hard soled shoe and outpatient podiatry follow-up. He was advised to return to the ER for new or worsening symptoms.      Ranjit Almodovar MD  09/20/22 3104

## 2022-09-26 ENCOUNTER — HOSPITAL ENCOUNTER (INPATIENT)
Age: 67
LOS: 9 days | Discharge: NURSING, REHAB OR LONG-TERM CARE | DRG: 853 | End: 2022-10-05
Attending: EMERGENCY MEDICINE | Admitting: INTERNAL MEDICINE
Payer: MEDICARE

## 2022-09-26 ENCOUNTER — APPOINTMENT (OUTPATIENT)
Dept: GENERAL RADIOLOGY | Age: 67
DRG: 853 | End: 2022-09-26
Payer: MEDICARE

## 2022-09-26 ENCOUNTER — APPOINTMENT (OUTPATIENT)
Dept: CT IMAGING | Age: 67
DRG: 853 | End: 2022-09-26
Payer: MEDICARE

## 2022-09-26 DIAGNOSIS — L02.612 ABSCESS OF LEFT FOOT: ICD-10-CM

## 2022-09-26 DIAGNOSIS — A49.02 MRSA INFECTION: ICD-10-CM

## 2022-09-26 DIAGNOSIS — I96 GANGRENE OF LEFT FOOT (HCC): ICD-10-CM

## 2022-09-26 DIAGNOSIS — L08.9 WOUND INFECTION, POSTTRAUMATIC: Primary | ICD-10-CM

## 2022-09-26 DIAGNOSIS — T14.8XXA WOUND INFECTION, POSTTRAUMATIC: Primary | ICD-10-CM

## 2022-09-26 LAB
A/G RATIO: 0.9 (ref 1.1–2.2)
ABO/RH: NORMAL
ALBUMIN SERPL-MCNC: 3.6 G/DL (ref 3.4–5)
ALP BLD-CCNC: 99 U/L (ref 40–129)
ALT SERPL-CCNC: 17 U/L (ref 10–40)
ANION GAP SERPL CALCULATED.3IONS-SCNC: 12 MMOL/L (ref 3–16)
ANTIBODY SCREEN: NORMAL
AST SERPL-CCNC: 17 U/L (ref 15–37)
BASOPHILS ABSOLUTE: 0 K/UL (ref 0–0.2)
BASOPHILS RELATIVE PERCENT: 0.2 %
BILIRUB SERPL-MCNC: <0.2 MG/DL (ref 0–1)
BUN BLDV-MCNC: 19 MG/DL (ref 7–20)
C-REACTIVE PROTEIN: 194.6 MG/L (ref 0–5.1)
CALCIUM SERPL-MCNC: 9.2 MG/DL (ref 8.3–10.6)
CHLORIDE BLD-SCNC: 102 MMOL/L (ref 99–110)
CO2: 26 MMOL/L (ref 21–32)
CREAT SERPL-MCNC: 1.1 MG/DL (ref 0.8–1.3)
EKG ATRIAL RATE: 91 BPM
EKG DIAGNOSIS: NORMAL
EKG P AXIS: 50 DEGREES
EKG P-R INTERVAL: 170 MS
EKG Q-T INTERVAL: 324 MS
EKG QRS DURATION: 62 MS
EKG QTC CALCULATION (BAZETT): 398 MS
EKG R AXIS: 19 DEGREES
EKG T AXIS: 32 DEGREES
EKG VENTRICULAR RATE: 91 BPM
EOSINOPHILS ABSOLUTE: 0 K/UL (ref 0–0.6)
EOSINOPHILS RELATIVE PERCENT: 0.3 %
GFR AFRICAN AMERICAN: >60
GFR NON-AFRICAN AMERICAN: >60
GLUCOSE BLD-MCNC: 128 MG/DL (ref 70–99)
GLUCOSE BLD-MCNC: 224 MG/DL (ref 70–99)
HCT VFR BLD CALC: 29.8 % (ref 40.5–52.5)
HEMOGLOBIN: 9.6 G/DL (ref 13.5–17.5)
INR BLD: 1.18 (ref 0.87–1.14)
LACTIC ACID, SEPSIS: 0.9 MMOL/L (ref 0.4–1.9)
LACTIC ACID, SEPSIS: 1 MMOL/L (ref 0.4–1.9)
LYMPHOCYTES ABSOLUTE: 0.9 K/UL (ref 1–5.1)
LYMPHOCYTES RELATIVE PERCENT: 6.7 %
MCH RBC QN AUTO: 28.5 PG (ref 26–34)
MCHC RBC AUTO-ENTMCNC: 32.2 G/DL (ref 31–36)
MCV RBC AUTO: 88.5 FL (ref 80–100)
MONOCYTES ABSOLUTE: 0.9 K/UL (ref 0–1.3)
MONOCYTES RELATIVE PERCENT: 6.7 %
NEUTROPHILS ABSOLUTE: 11.6 K/UL (ref 1.7–7.7)
NEUTROPHILS RELATIVE PERCENT: 86.1 %
PDW BLD-RTO: 14.9 % (ref 12.4–15.4)
PERFORMED ON: ABNORMAL
PLATELET # BLD: 336 K/UL (ref 135–450)
PMV BLD AUTO: 8.1 FL (ref 5–10.5)
POTASSIUM REFLEX MAGNESIUM: 4.1 MMOL/L (ref 3.5–5.1)
PROTHROMBIN TIME: 15 SEC (ref 11.7–14.5)
RBC # BLD: 3.37 M/UL (ref 4.2–5.9)
SEDIMENTATION RATE, ERYTHROCYTE: 90 MM/HR (ref 0–20)
SODIUM BLD-SCNC: 140 MMOL/L (ref 136–145)
TOTAL PROTEIN: 7.5 G/DL (ref 6.4–8.2)
WBC # BLD: 13.5 K/UL (ref 4–11)

## 2022-09-26 PROCEDURE — 73630 X-RAY EXAM OF FOOT: CPT

## 2022-09-26 PROCEDURE — 85652 RBC SED RATE AUTOMATED: CPT

## 2022-09-26 PROCEDURE — 87077 CULTURE AEROBIC IDENTIFY: CPT

## 2022-09-26 PROCEDURE — 96375 TX/PRO/DX INJ NEW DRUG ADDON: CPT

## 2022-09-26 PROCEDURE — 87070 CULTURE OTHR SPECIMN AEROBIC: CPT

## 2022-09-26 PROCEDURE — 96365 THER/PROPH/DIAG IV INF INIT: CPT

## 2022-09-26 PROCEDURE — 36415 COLL VENOUS BLD VENIPUNCTURE: CPT

## 2022-09-26 PROCEDURE — 71046 X-RAY EXAM CHEST 2 VIEWS: CPT

## 2022-09-26 PROCEDURE — 93005 ELECTROCARDIOGRAM TRACING: CPT | Performed by: EMERGENCY MEDICINE

## 2022-09-26 PROCEDURE — 6370000000 HC RX 637 (ALT 250 FOR IP): Performed by: EMERGENCY MEDICINE

## 2022-09-26 PROCEDURE — 73700 CT LOWER EXTREMITY W/O DYE: CPT

## 2022-09-26 PROCEDURE — 85610 PROTHROMBIN TIME: CPT

## 2022-09-26 PROCEDURE — 6370000000 HC RX 637 (ALT 250 FOR IP): Performed by: INTERNAL MEDICINE

## 2022-09-26 PROCEDURE — 1200000000 HC SEMI PRIVATE

## 2022-09-26 PROCEDURE — 99285 EMERGENCY DEPT VISIT HI MDM: CPT

## 2022-09-26 PROCEDURE — 83036 HEMOGLOBIN GLYCOSYLATED A1C: CPT

## 2022-09-26 PROCEDURE — 87186 SC STD MICRODIL/AGAR DIL: CPT

## 2022-09-26 PROCEDURE — 6360000002 HC RX W HCPCS: Performed by: EMERGENCY MEDICINE

## 2022-09-26 PROCEDURE — 86900 BLOOD TYPING SEROLOGIC ABO: CPT

## 2022-09-26 PROCEDURE — 87075 CULTR BACTERIA EXCEPT BLOOD: CPT

## 2022-09-26 PROCEDURE — 86850 RBC ANTIBODY SCREEN: CPT

## 2022-09-26 PROCEDURE — 87040 BLOOD CULTURE FOR BACTERIA: CPT

## 2022-09-26 PROCEDURE — 83605 ASSAY OF LACTIC ACID: CPT

## 2022-09-26 PROCEDURE — 85025 COMPLETE CBC W/AUTO DIFF WBC: CPT

## 2022-09-26 PROCEDURE — 86140 C-REACTIVE PROTEIN: CPT

## 2022-09-26 PROCEDURE — 87205 SMEAR GRAM STAIN: CPT

## 2022-09-26 PROCEDURE — 80053 COMPREHEN METABOLIC PANEL: CPT

## 2022-09-26 PROCEDURE — 2580000003 HC RX 258: Performed by: EMERGENCY MEDICINE

## 2022-09-26 PROCEDURE — 86901 BLOOD TYPING SEROLOGIC RH(D): CPT

## 2022-09-26 RX ORDER — FOLIC ACID 1 MG/1
1 TABLET ORAL DAILY
COMMUNITY

## 2022-09-26 RX ORDER — INSULIN LISPRO 100 [IU]/ML
0-4 INJECTION, SOLUTION INTRAVENOUS; SUBCUTANEOUS NIGHTLY
Status: DISCONTINUED | OUTPATIENT
Start: 2022-09-26 | End: 2022-09-28

## 2022-09-26 RX ORDER — QUETIAPINE FUMARATE 25 MG/1
25 TABLET, FILM COATED ORAL NIGHTLY
COMMUNITY

## 2022-09-26 RX ORDER — 0.9 % SODIUM CHLORIDE 0.9 %
30 INTRAVENOUS SOLUTION INTRAVENOUS ONCE
Status: COMPLETED | OUTPATIENT
Start: 2022-09-26 | End: 2022-09-26

## 2022-09-26 RX ORDER — ACETAMINOPHEN 325 MG/1
650 TABLET ORAL ONCE
Status: COMPLETED | OUTPATIENT
Start: 2022-09-26 | End: 2022-09-26

## 2022-09-26 RX ORDER — OLANZAPINE 5 MG/1
5 TABLET ORAL 2 TIMES DAILY
COMMUNITY

## 2022-09-26 RX ORDER — ACETAMINOPHEN 325 MG/1
650 TABLET ORAL EVERY 8 HOURS PRN
COMMUNITY

## 2022-09-26 RX ORDER — DEXTROSE MONOHYDRATE 100 MG/ML
INJECTION, SOLUTION INTRAVENOUS CONTINUOUS PRN
Status: DISCONTINUED | OUTPATIENT
Start: 2022-09-26 | End: 2022-10-05 | Stop reason: HOSPADM

## 2022-09-26 RX ORDER — INSULIN LISPRO 100 [IU]/ML
0-4 INJECTION, SOLUTION INTRAVENOUS; SUBCUTANEOUS
Status: DISCONTINUED | OUTPATIENT
Start: 2022-09-27 | End: 2022-09-28

## 2022-09-26 RX ORDER — CHOLECALCIFEROL (VITAMIN D3) 125 MCG
3 TABLET ORAL DAILY
COMMUNITY

## 2022-09-26 RX ORDER — FERROUS SULFATE 325(65) MG
325 TABLET ORAL
COMMUNITY

## 2022-09-26 RX ORDER — LANOLIN ALCOHOL/MO/W.PET/CERES
6 CREAM (GRAM) TOPICAL NIGHTLY
COMMUNITY

## 2022-09-26 RX ORDER — INSULIN GLARGINE 100 [IU]/ML
13 INJECTION, SOLUTION SUBCUTANEOUS NIGHTLY
Status: ON HOLD | COMMUNITY
End: 2022-10-04 | Stop reason: HOSPADM

## 2022-09-26 RX ORDER — LISINOPRIL 20 MG/1
20 TABLET ORAL DAILY
Status: ON HOLD | COMMUNITY
End: 2022-10-04 | Stop reason: HOSPADM

## 2022-09-26 RX ADMIN — ACETAMINOPHEN 650 MG: 325 TABLET, FILM COATED ORAL at 14:00

## 2022-09-26 RX ADMIN — SODIUM CHLORIDE 2550 ML: 9 INJECTION, SOLUTION INTRAVENOUS at 13:55

## 2022-09-26 RX ADMIN — CEFEPIME 2000 MG: 2 INJECTION, POWDER, FOR SOLUTION INTRAVENOUS at 13:54

## 2022-09-26 RX ADMIN — VANCOMYCIN HYDROCHLORIDE 2000 MG: 1 INJECTION, POWDER, LYOPHILIZED, FOR SOLUTION INTRAVENOUS at 14:34

## 2022-09-26 RX ADMIN — METOPROLOL TARTRATE 25 MG: 25 TABLET, FILM COATED ORAL at 21:25

## 2022-09-26 ASSESSMENT — PAIN DESCRIPTION - PAIN TYPE: TYPE: ACUTE PAIN

## 2022-09-26 ASSESSMENT — PAIN SCALES - GENERAL: PAINLEVEL_OUTOF10: 8

## 2022-09-26 ASSESSMENT — ENCOUNTER SYMPTOMS
ABDOMINAL PAIN: 0
COUGH: 0
VOMITING: 0
GASTROINTESTINAL NEGATIVE: 1
EYES NEGATIVE: 1
RESPIRATORY NEGATIVE: 1

## 2022-09-26 ASSESSMENT — PAIN DESCRIPTION - ORIENTATION: ORIENTATION: LEFT

## 2022-09-26 ASSESSMENT — PAIN DESCRIPTION - DESCRIPTORS: DESCRIPTORS: ACHING

## 2022-09-26 ASSESSMENT — PAIN DESCRIPTION - FREQUENCY: FREQUENCY: CONTINUOUS

## 2022-09-26 ASSESSMENT — PAIN - FUNCTIONAL ASSESSMENT: PAIN_FUNCTIONAL_ASSESSMENT: 0-10

## 2022-09-26 ASSESSMENT — PAIN DESCRIPTION - LOCATION: LOCATION: FOOT

## 2022-09-26 NOTE — PROGRESS NOTES
Pre-Operative Note  Resident Note     Patient: Ha Cuello     Procedure: Left foot incision and drainage    Clearance for surgery: Yes, per Internal Medicine    Consent: Procedure was discussed at length with patient and patients daughters and all questions were answered to completion, consent obtained from patients daughter and placed in chart     Labs:        Recent Labs     09/26/22  1342   WBC 13.5*   HGB 9.6*   HCT 29.8*   MCV 88.5           Recent Labs     09/26/22  1342      K 4.1      CO2 26   BUN 19   CREATININE 1.1        Recent Labs     09/26/22  1342   AST 17   ALT 17   BILITOT <0.2   ALKPHOS 99      No results for input(s): LIPASE, AMYLASE in the last 72 hours. Recent Labs     09/26/22  1342   PROT 7.5   INR 1.18*      No results for input(s): CKTOTAL, CKMB, CKMBINDEX, TROPONINI in the last 72 hours.     Pre-op Medications:   Current Facility-Administered Medications   Medication Dose Route Frequency Provider Last Rate Last Admin    acetaminophen (TYLENOL) suppository 650 mg  650 mg Rectal Q4H PRN Kavita Jensen MD   650 mg at 09/27/22 0319    cefepime (MAXIPIME) 2000 mg IVPB minibag  2,000 mg IntraVENous Q12H Luis Alberto Sevilla MD 12.5 mL/hr at 09/27/22 0317 2,000 mg at 09/27/22 0317    glucose chewable tablet 16 g  4 tablet Oral PRN Sallie Cuba MD        dextrose bolus 10% 125 mL  125 mL IntraVENous PRN Sallie Cuba MD        Or    dextrose bolus 10% 250 mL  250 mL IntraVENous PRN Sallie Cuba MD        glucagon (rDNA) injection 1 mg  1 mg SubCUTAneous PRN Sallie Cuba MD        dextrose 10 % infusion   IntraVENous Continuous PRN Sallie Cuba MD        insulin lispro (1 Unit Dial) 0-4 Units  0-4 Units SubCUTAneous TID WC Sallie Cuba MD        insulin lispro (1 Unit Dial) 0-4 Units  0-4 Units SubCUTAneous Nightly Sallie Cuba MD        metoprolol tartrate (LOPRESSOR) tablet 25 mg  25 mg Oral BID Sallie Cuba MD   25 mg at 09/26/22 2125    vancomycin (VANCOCIN) 750 mg in dextrose 5 % 250 mL IVPB  750 mg IntraVENous Q12H Luis Alberto Sevilla MD   Stopped at 09/27/22 9900       Diet: Diet NPO Exceptions are: Sips of Water with Meds    CXR:  B/l peribronchial thickening representing bronchitis or asthma. No acute pulmonary consolidation        EKG:  See cardio section of EMR      Echocardiogram: not done    IV access/ saline lock: Yes    Antibiotics: Vancomycin and Cefepime    PT/INR: 15/1.18    Type and Screen: O positive, negative antibody screen    Pregnancy test: Not applicable    Known risk factors for perioperative complications: Diabetes mellitus type II, HTN, and iron deficiency anemia      Anesthesia to see patient.     Madelyn Austin DPM  09/27/22  8:20 AM

## 2022-09-26 NOTE — PLAN OF CARE
Podiatric Surgery Plan of Care Note  Robinson Dandy         Discussed surgical intervention with patient at bedside. All potential risks, benefits, and complications were discussed with the patient prior to the scheduling of surgery. No guarantees or promises were made to what the outcomes will be.  The patient wished to proceed with surgery, and informed written consent was obtained from patients daughter due to patients AMS, signed, and placed on the patient's chart.    -Patient will be NPO at midnight   -PT/INR ordered   -Type and Screen ordered   -Chest Xray ordered   -NWB to [facility]

## 2022-09-26 NOTE — PLAN OF CARE
Problem: Safety - Adult  Goal: Free from fall injury  Outcome: Progressing     Problem: Skin/Tissue Integrity  Goal: Absence of new skin breakdown  Outcome: Progressing     Problem: ABCDS Injury Assessment  Goal: Absence of physical injury  Outcome: Progressing     Patient admitted from ED. He is alert to self, confused to place, situation, time. Patient unable to answer admission questions for me due to mentation. Bed in low position, call light in reach, bed alarm on, will continue to monitor.

## 2022-09-26 NOTE — ED PROVIDER NOTES
[address] [facility]          ATTENDING PHYSICIAN NOTE       Date of evaluation: 9/26/2022    Chief Complaint     Foot Pain (Pt reports he injured left foot last week and it was sutured. Today nurse from [facility] noticed swelling and redness around the foot. Pain 8/10)      History of Present Illness     Segun Garg is a 74 y.o. male who presents to the emergency department from nursing facility for evaluation of concern of wound infection. He had an emergency department visit roughly 10 days ago after an injury to his left great toe associated with a laceration and a fracture. He was evaluated by the podiatry service in the emergency department, who closed the wound, and he was discharged home to the nursing facility on antibiotics. He was found today to be more lethargic than usual, and staff noticed erythema, swelling, and other skin changes during the dressing change, so sent him here for evaluation. He is somewhat lethargic, making thorough conversation difficult. This somewhat limits his history of present illness and review of systems. Review of Systems     Review of Systems   Unable to perform ROS: Mental status change   Constitutional:  Positive for chills and fever. Respiratory:  Negative for cough. Gastrointestinal:  Negative for abdominal pain and vomiting. Past Medical, Surgical, Family, and Social History     He has no past medical history on file. He has no past surgical history on file. His family history is not on file. He reports that he has never smoked. He has never used smokeless tobacco. He reports that he does not currently use alcohol. He reports that he does not use drugs.     Review of documentation from nursing facility shows diabetes, psychiatric disease, high blood pressure    Medications     Previous Medications    ACETAMINOPHEN (TYLENOL) 325 MG TABLET    Take 650 mg by mouth every 6 hours as needed for Pain    ERGOCALCIFEROL (VITAMIN D2) 50 MCG (2000 UT) TABS    Take by mouth    FERROUS SULFATE (IRON 325) 325 (65 FE) MG TABLET    Take 325 mg by mouth daily (with breakfast)    FOLIC ACID (FOLVITE) 1 MG TABLET    Take 1 mg by mouth daily    INSULIN GLARGINE (LANTUS) 100 UNIT/ML INJECTION VIAL    Inject 13 Units into the skin nightly    LISINOPRIL (PRINIVIL;ZESTRIL) 20 MG TABLET    Take 20 mg by mouth daily    MELATONIN 3 MG TABS TABLET    Take 3 mg by mouth daily    METFORMIN (GLUCOPHAGE) 500 MG TABLET    Take 500 mg by mouth 2 times daily (with meals)    OLANZAPINE (ZYPREXA) 5 MG TABLET    Take 5 mg by mouth nightly    QUETIAPINE (SEROQUEL) 25 MG TABLET    Take 25 mg by mouth 2 times daily       Allergies     He has No Known Allergies. Physical Exam     INITIAL VITALS: BP (!) 176/63   Pulse 98   Temp (!) 101.2 °F (38.4 °C) (Oral)   Resp 22   Wt 187 lb 6.4 oz (85 kg)   SpO2 95%    General: Well developed, well nourished male in no acute distress. HEENT: Sclera white, conjunctiva pink, mucous membranes moist and oropharynx clear  Neck: Supple, no meningismus or JVD  Respirations: Unlabored with symmetric chest rise and fall  CV: Tachycardic rate with regular rhythm with strong distal pulses  Abd: Soft without rebound guarding distention or focal tenderness  Musculoskeletal: Except his left foot, he moves all extremities with no signs of orthopedic trauma or edema. Left foot: Soft tissue swelling, erythema, and warmth across the dorsum of the foot, focused at the left great toe. Several sutures intact, with underlying skin change and swelling concerning for purulence, but no spontaneously draining purulence. Dressing was clean dry and intact on arrival.  Skin: Warm and dry with no rashes or lesions. Neuro: Awake but somewhat lethargic with no obvious neurologic deficits. Normal speech and facial symmetry. Psych: Mood and affect are normal.    Diagnostic Results     EKG   Indication: Tachycardia. Interpreted by me.   Normal sinus rhythm, normal axis and intervals, rate 91. No ST segment or T wave changes. Interpretation: Normal sinus rhythm no signs of acute ischemia or infarction. RADIOLOGY:  XR FOOT LEFT (MIN 3 VIEWS)   Final Result   Impression: Increased interval displacement of the previously noted fracture fragment of the great toe. No discrete soft tissue gas.           LABS:   Results for orders placed or performed during the hospital encounter of 09/26/22   CBC with Auto Differential   Result Value Ref Range    WBC 13.5 (H) 4.0 - 11.0 K/uL    RBC 3.37 (L) 4.20 - 5.90 M/uL    Hemoglobin 9.6 (L) 13.5 - 17.5 g/dL    Hematocrit 29.8 (L) 40.5 - 52.5 %    MCV 88.5 80.0 - 100.0 fL    MCH 28.5 26.0 - 34.0 pg    MCHC 32.2 31.0 - 36.0 g/dL    RDW 14.9 12.4 - 15.4 %    Platelets 495 966 - 300 K/uL    MPV 8.1 5.0 - 10.5 fL    Neutrophils % 86.1 %    Lymphocytes % 6.7 %    Monocytes % 6.7 %    Eosinophils % 0.3 %    Basophils % 0.2 %    Neutrophils Absolute 11.6 (H) 1.7 - 7.7 K/uL    Lymphocytes Absolute 0.9 (L) 1.0 - 5.1 K/uL    Monocytes Absolute 0.9 0.0 - 1.3 K/uL    Eosinophils Absolute 0.0 0.0 - 0.6 K/uL    Basophils Absolute 0.0 0.0 - 0.2 K/uL   CMP w/ Reflex to MG   Result Value Ref Range    Sodium 140 136 - 145 mmol/L    Potassium reflex Magnesium 4.1 3.5 - 5.1 mmol/L    Chloride 102 99 - 110 mmol/L    CO2 26 21 - 32 mmol/L    Anion Gap 12 3 - 16    Glucose 128 (H) 70 - 99 mg/dL    BUN 19 7 - 20 mg/dL    Creatinine 1.1 0.8 - 1.3 mg/dL    GFR Non-African American >60 >60    GFR African American >60 >60    Calcium 9.2 8.3 - 10.6 mg/dL    Total Protein 7.5 6.4 - 8.2 g/dL    Albumin 3.6 3.4 - 5.0 g/dL    Albumin/Globulin Ratio 0.9 (L) 1.1 - 2.2    Total Bilirubin <0.2 0.0 - 1.0 mg/dL    Alkaline Phosphatase 99 40 - 129 U/L    ALT 17 10 - 40 U/L    AST 17 15 - 37 U/L   Lactate, Sepsis   Result Value Ref Range    Lactic Acid, Sepsis 1.0 0.4 - 1.9 mmol/L   Sedimentation Rate   Result Value Ref Range    Sed Rate 90 (H) 0 - 20 mm/Hr EKG 12 Lead   Result Value Ref Range    Ventricular Rate 91 BPM    Atrial Rate 91 BPM    P-R Interval 170 ms    QRS Duration 62 ms    Q-T Interval 324 ms    QTc Calculation (Bazett) 398 ms    P Axis 50 degrees    R Axis 19 degrees    T Axis 32 degrees    Diagnosis       EKG performed in ER and to be interpreted by ER physician. Confirmed by MD, ER ([id number]),  Richie Jacobs ([id number]) on 9/26/2022 1:59:44 PM     RECENT VITALS:  BP: (!) 176/63, Temp: (!) 101.2 °F (38.4 °C), Heart Rate: 98, Resp: 22, SpO2: 95 %     Procedures     Suture removal and irrigation done by the podiatry service. By report, roughly 2 cc of purulent material obtained, with wound culture sent. ED Course     Nursing Notes, Past Medical Hx, Past Surgical Hx, Social Hx, Allergies, and Family Hx were reviewed. The patient was given the following medications:  Orders Placed This Encounter   Medications    cefepime (MAXIPIME) 2000 mg IVPB minibag     Order Specific Question:   Antimicrobial Indications     Answer: Other     Order Specific Question:   Other Abx Indication     Answer: Suspected Sepsis of Skin or Soft Tissue Origin    vancomycin (VANCOCIN) 2,000 mg in dextrose 5 % 500 mL IVPB     Order Specific Question:   Antimicrobial Indications     Answer: Other     Order Specific Question:   Other Abx Indication     Answer: Suspected Sepsis of Skin or Soft Tissue Origin    acetaminophen (TYLENOL) tablet 650 mg    0.9 % sodium chloride bolus     Was seen and examined arrival to the treatment area. Nurse notes computerized records were reviewed. Based on overall presentation, sepsis from skin and soft tissue was suspected, with antibiotics and fluids ordered. Podiatry consulted after initial evaluation, with prompt response. They provide wound care as above. CT scan is being obtained to evaluate for deep space or necrotizing infection.   Care is being transitioned to the oncoming provider to follow-up with CT scan results and make ultimate disposition. He will certainly be admitted, but CT scan will determine if he needs operative intervention and if so, the acuity. CONSULTS:  [id number] Yvonne Sheridan / ASSESSMENT /  Henny is a 74 y.o. male presents to the emergency department with fever, mental status change, in the context of an infected laceration and underlying diabetes. Sepsis bundle of care was initiated including broad-spectrum antibiotics and fluids. Lactate is not significant elevated, which is reassuring. He has remained normotensive. Tachycardia has improved. He has received wound care, which provide some source control. There is a possibility of necrotizing or deep space infection, which is being evaluated by CT scan, and may require operative intervention. Otherwise, hemodynamically stable. No other evidence of endorgan dysfunction. He does have mild confusion, which may be related to his fever and active infection. I do not suspect stroke or other intracranial process. Critical Care:  Due to the immediate potential for life-threatening deterioration due to sepsis, I spent 35 minutes providing critical care. This time excludes time spent performing procedures but includes time spent on direct patient care, history retrieval, review of the chart, and discussions with patient, family, and consultant(s). Clinical Impression     1.  Wound infection, posttraumatic        Disposition     DISPOSITION    Admission         Abigail Boss MD  09/26/22 [id number]

## 2022-09-26 NOTE — H&P
Internal Medicine H&P    Date: 2022   Patient: Lauren Gallegos   Patient : 1955     CC: Foot Pain (Pt reports he injured left foot last week and it was sutured. Today nurse from [facility] trace noticed swelling and redness around the foot. Pain 8/10)       HPI: Lauren Gallegos is a 74 y.o. M who presents from [address] [place] [address] for worsening of a recent foot injury. Notably, he was seen on 22 in the ER for an open intra-articular fracture thought the base of the first digit on the left. It was treated at bedside with flushing and repair by podiatry at that time, and the patient was given Ancef in the ER and sent home on Keflex. Patient did not follow up with Dr. Robert Frankel outpatient as he was directed. Patient is Alert and Orientated x 2 (to person, place, but not year) and states he has trouble figuring out words. He is unable to tell me about his PMHx. His daughters were present when Podiatry saw the patient, and reportedly the family states they are unsure if the patient's facility did dressing changes or administered his antibiotics. Patient is a poor historian and is unable to corrobarate taking meds or not. Patient does endorse fevers. Family noticed redness and drainage from his foot wound. Patient complains of pain, even when not moving his foot. In the ED: Podiatry performed a bedside I&D and obtained wound cultures. PMHx:      Diagnosis Date    Altered mental status     Diabetes ([facility]     Hypertension     Iron deficiency anemia     Psychotic disorder with hallucinations ([facility]        PSurgHx:  History reviewed. No pertinent surgical history.      Medication:  Medications Prior to Admission: acetaminophen (TYLENOL) 325 MG tablet, Take 650 mg by mouth every 6 hours as needed for Pain  ferrous sulfate (IRON 325) 325 (65 Fe) MG tablet, Take 325 mg by mouth daily (with breakfast)  folic acid (FOLVITE) 1 MG tablet, Take 1 mg by mouth daily  insulin glargine (LANTUS) 100 UNIT/ML injection vial, Inject 13 Units into the skin nightly  lisinopril (PRINIVIL;ZESTRIL) 20 MG tablet, Take 20 mg by mouth daily  melatonin 3 MG TABS tablet, Take 6 mg by mouth at bedtime  metFORMIN (GLUCOPHAGE) 500 MG tablet, Take 500 mg by mouth 2 times daily (with meals)  OLANZapine (ZYPREXA) 5 MG tablet, Take 5 mg by mouth in the morning and at bedtime  QUEtiapine (SEROQUEL) 25 MG tablet, Take 25 mg by mouth at bedtime  Ergocalciferol (VITAMIN D2) 50 MCG (2000 UT) TABS, Take 3 tablets by mouth daily    Allergies:  Patient has no known allergies. SocHx:  Social History     Socioeconomic History    Marital status: Single     Spouse name: None    Number of children: None    Years of education: None    Highest education level: None   Tobacco Use    Smoking status: Never    Smokeless tobacco: Never   Substance and Sexual Activity    Alcohol use: Not Currently    Drug use: Never        FHx:  History reviewed. No pertinent family history. ROS:  Review of Systems   Constitutional:  Positive for fever. HENT: Negative. Eyes: Negative. Respiratory: Negative. Cardiovascular: Negative. Gastrointestinal: Negative. Genitourinary: Negative. Neurological:  Positive for speech difficulty. Vital Signs:  Patient Vitals for the past 8 hrs:   BP Temp Temp src Pulse Resp SpO2 Weight   09/26/22 1732 (!) 146/72 (!) 100.5 °F (38.1 °C) Oral 92 16 95 % --   09/26/22 1630 (!) 140/64 -- -- 97 16 95 % --   09/26/22 1600 (!) 146/67 -- -- 90 19 94 % --   09/26/22 1500 (!) 149/58 -- -- 87 16 96 % --   09/26/22 1430 (!) 176/63 -- -- 98 22 95 % --   09/26/22 1400 (!) 159/76 -- -- 92 20 95 % --   09/26/22 1329 -- -- -- -- -- -- 187 lb 6.4 oz (85 kg)   09/26/22 1323 (!) 132/57 (!) 101.2 °F (38.4 °C) Oral 89 18 -- --       Physical Exam:  Physical Exam  Eyes:      Extraocular Movements: Extraocular movements intact. Cardiovascular:      Rate and Rhythm: Normal rate and regular rhythm.    Pulmonary:      Effort: Pulmonary effort is normal.      Breath sounds: Normal breath sounds. Abdominal:      General: Abdomen is flat. Palpations: Abdomen is soft. Musculoskeletal:         General: Signs of injury present. Cervical back: Normal range of motion. Skin:     General: Skin is warm and dry. Neurological:      Mental Status: He is alert. Comments: Oriented to person and place, not year          Labs:  CBC:   Recent Labs     09/26/22  1342   WBC 13.5*   HGB 9.6*   HCT 29.8*          BMP:   Recent Labs     09/26/22  1342      K 4.1      CO2 26   BUN 19   CREATININE 1.1   GLUCOSE 128*     Magnesium: No results for input(s): MG in the last 72 hours. LFT's:   Recent Labs     09/26/22  1342   AST 17   ALT 17   BILITOT <0.2   ALKPHOS 99       Troponin: No results for input(s): TROPONINI in the last 72 hours. Lactic acid: No results for input(s): LACTATE in the last 72 hours. BNP: No results for input(s): BNP in the last 72 hours. Pro-BNP: No results for input(s): PROBNP in the last 72 hours. Procalcitonin: No results for input(s): PROCAL in the last 72 hours. CRP: No results for input(s): CRP in the last 72 hours. ESR: No results for input(s): ESR in the last 72 hours. ABGs: No results for input(s): PHART, EGJ2PHJ, PO2ART in the last 72 hours. VBGs: No results for input(s): PHVEN, ERB4OQT, PO2VEN in the last 72 hours. INR:   Recent Labs     09/26/22  1342   INR 1.18*       COVID-19: No results for input(s): COVID19 in the last 72 hours. U/A: No results for input(s): NITRITE, COLORU, PHUR, LABCAST, WBCUA, RBCUA, MUCUS, TRICHOMONAS, YEAST, BACTERIA, CLARITYU, SPECGRAV, LEUKOCYTESUR, UROBILINOGEN, BILIRUBINUR, BLOODU, GLUCOSEU, KETONES, AMORPHOUS in the last 72 hours. Radiology:  CT FOOT LEFT WO CONTRAST   Final Result   Impression: Small locules of soft tissue gas adjacent to the major fracture fragment of the left great toe.  Correlation for open fracture versus soft tissue

## 2022-09-26 NOTE — CONSULTS
Department of Podiatry Consult Note  Resident       Reason for Consult: Left Foot infection  Requesting Physician:  Dr Donna Franklin: Left hallux infection    HISTORY OF PRESENT ILLNESS:                The patient is a 74 y.o. male with significant past medical history as listed below. Podiatry was consulted for left foot infection. A majority of the history was provided by both of his daughters. On 9/17/22 patient presented to [facility] emergency room with an open intra-articular fracture through the base of the first digit on the left. After laceration was thoroughly flushed at bedside, the laceration was repaired and patient was discharged home on 7-day Keflex and asked to follow-up with Dr. Uyen NARANJOPRAMBO. outpatient. Patient did not follow-up with Dr. Nubia Vicente. His daughters state that they believe that his facility did not change the dressing since the initial injury. Patient's daughters question if he took the full course of Keflex. He was found today to be more lethargic than usual and staff noticed erythema, swelling, and other skin changes during his dressing change today. Patient was then sent to the [facility] for further evaluation. When asked about his compliance with his weightbearing status, patient could not remember. Patient endorses fever and chills. Patient specifically denies nausea and vomiting, however his daughters state he complained of nausea. Patient's daughters state that they noticed large amounts of purulent drainage from the wound. Patient denies shortness of breath, chest pain. Patient has no other pedal complaints at this time. Past Medical History:    History reviewed. No pertinent past medical history. Past Surgical History:    History reviewed. No pertinent surgical history. Allergies:   Patient has no known allergies. Medications:   Home Meds  No current facility-administered medications on file prior to encounter.      Current Outpatient Medications on File Prior to Encounter   Medication Sig Dispense Refill    acetaminophen (TYLENOL) 325 MG tablet Take 650 mg by mouth every 6 hours as needed for Pain      ferrous sulfate (IRON 325) 325 (65 Fe) MG tablet Take 325 mg by mouth daily (with breakfast)      folic acid (FOLVITE) 1 MG tablet Take 1 mg by mouth daily      insulin glargine (LANTUS) 100 UNIT/ML injection vial Inject 13 Units into the skin nightly      lisinopril (PRINIVIL;ZESTRIL) 20 MG tablet Take 20 mg by mouth daily      melatonin 3 MG TABS tablet Take 3 mg by mouth daily      metFORMIN (GLUCOPHAGE) 500 MG tablet Take 500 mg by mouth 2 times daily (with meals)      OLANZapine (ZYPREXA) 5 MG tablet Take 5 mg by mouth nightly      QUEtiapine (SEROQUEL) 25 MG tablet Take 25 mg by mouth 2 times daily      Ergocalciferol (VITAMIN D2) 50 MCG (2000 UT) TABS Take by mouth         Current Meds  vancomycin (VANCOCIN) 2,000 mg in dextrose 5 % 500 mL IVPB, Once  0.9 % sodium chloride bolus, Once        Family History:   History reviewed. No pertinent family history. Social History:   TOBACCO:   reports that he has never smoked. He has never used smokeless tobacco.  ETOH:   reports that he does not currently use alcohol. DRUGS:   reports no history of drug use. REVIEW OF SYSTEMS:    As above. PHYSICAL EXAM:      Vitals:    BP (!) 176/63   Pulse 87   Temp (!) 101.2 °F (38.4 °C) (Oral)   Resp 22   Wt 187 lb 6.4 oz (85 kg)   SpO2 96%     LABS:   Recent Labs     09/26/22  1342   WBC 13.5*   HGB 9.6*   HCT 29.8*        Recent Labs     09/26/22  1342      K 4.1      CO2 26   BUN 19   CREATININE 1.1     Recent Labs     09/26/22  1342   PROT 7.5         VASCULAR: DP and PT pulses are faintly palpable +1/4 b/l. Upon hand-held Doppler examination, DP and PT pulses were noted to be biphasic bilateral. CFT is brisk to the digits of the foot b/l. Skin temperature is warm to cool from proximal to distal on right LE. Skin temperature is warm to warm from proximal to distal on left with focal calor noted diffusely to the left forefoot. Edema noted diffusely to the left forefoot especially at the left hallux. Erythema noted to the left forefoot that tracks proximally until the level of the distal shin. No pain with calf compression b/l. NEUROLOGIC: Gross and epicritic sensation is diminished b/l. Protective sensation is absent at all pedal sites b/l. DERMATOLOGIC:   Left LE:  Full-thickness incision was noted on the lateral and plantar aspect of the hallux along the sulcus. Sutures remained intact. Upon removal of the sutures, approximately 2 cc of purulent drainage was expressed. Serious fluid filled bullae was appreciated at the medial aspect of the left first metatarsal.  Dusky skin changes were noted to the medial, dorsal and plantar aspect of the hallux. Gangrenous changes noted to the hallux. Fluctuance and crepitus noted at the dorsal aspect of the hallucal IPJ, medial aspect of the first MPJ, and plantar aspect of the hallux proximally to the level of the first MPJ. Malodor was noted. Wound does not tunnel or track. Wound does not probe to bone. Verbal consent was obtained for photos today:      MUSCULOSKELETAL: Muscle strength is 5/5 for all pedal groups tested. Mild pain to palpation noted to the left hallux. Ankle joint ROM is decreased in dorsiflexion with the knee extended. No obvious biomechanical abnormalities. IMAGING:  Narrative   XR FOOT LEFT (MIN 3 VIEWS)       Indication: infection, eval for air        COMPARISON: 9/17/2022       Findings: 3 views of the left foot were obtained. Again identified is a fracture of the proximal phalanx of the great toe. There is increased interval displacement of the fracture fragment with rotation laterally. No additional fracture deformity. No    soft tissue gas or acute abnormality.    Impression   Impression: Increased interval displacement of the previously noted fracture fragment of the great toe. No discrete soft tissue gas. Narrative   CT FOOT LEFT WO CONTRAST       Indication: Left hallux infection       Technique: Helical CT images of the left foot were acquired without the administration of intravenous contrast media. Axial, coronal and sagittal reformatted images were constructed from the raw data set. Individualized dose optimization technique was    used in order to meet ALARA standards for radiation dose reduction. In addition to vendor specific dose reduction algorithms, the dose reduction techniques vary based on the specific scanner utilized but frequently include automated exposure control,    adjustment of the mA and/or kV according to patient size, and use of iterative reconstruction technique. COMPARISON: 9/17/2022. Findings: Again identified is fracture at the lateral aspect of the proximal phalanx of the left great toe. Small locules of gas are visualized adjacent to the fracture fragment. Adjacent soft tissue swelling of the left great toe is noted. Bandage    material overlies the plantar aspect of the great toe. No additional fracture deformity. No organized or drainable fluid collection. Impression   Impression: Small locules of soft tissue gas adjacent to the major fracture fragment of the left great toe. Correlation for open fracture versus soft tissue infection recommended. Procedure:  Bedside I&D  Discussed bedside intervention with patient. All potential risks, benefits, and complications were discussed with the patient. The patient wished to proceed and informed verbal consent was obtained. At this time, attention was directed to the patients left foot where the screws were removed overlying the full-thickness ulceration at the lateral and plantar aspect of the left hallux. Approximately 2 cc of purulence was expressed.   Various fascial planes were explored utilizing scissors and no further purulence was expressed. A wound culture was obtained to be sent for microbiology culture and sensitivity. The wound was irrigated copiously with normal sterile saline spiked with Betadine. Upon completion of this hemostasis was achieved with direct pressure. Once hemostasis was achieved the wound was gently packed with Iodoform 1/2 inch iodoform packing material.  A soft sterile dressing was then applied consisting of Adaptic, Betadine, dry gauze, DSD, and Ace. The patient tolerated the procedure well with vital signs remaining stable and vascular status intact to the left foot following the procedure. IMPRESSION/RECOMMENDATIONS:      -Left foot infection, hallux 2/2 traumatic open fracture    -Positive SIRS criteria  -Previous open intra-articular fracture at the base of the proximal phalanx of the hallux; Left (8/17/22)  -Diabetes mellitus type 2    -Patient examined and evaluated at the bedside   -Patient febrile (101.2) and hypertensive to 176/63 otherwise all VSS. Leukocytosis noted (wbc 13.5). -ESR 90 CRP pending   -X-ray and CT scan reviewed, impressions noted above  -Wound culture pending  -Bedside I&D was performed, procedure detail as noted above  -Full-thickness wound was packed with 0.5 inch iodoform packing and dressed with Adaptic, Betadine, sterile gauze, DSD, Ace  -Patient will be admitted to the hospital for IV antibiotics and podiatric surgical intervention  -Plan for podiatric surgical intervention tomorrow for incision and drainage, primary team please risk stratify  -Continue broad-spectrum IV antibiotics: Vancomycin and cefepime  -Patient will be nonweightbearing to left LE      DISPO: Left hallux infection 2/2 traumatic open fracture. Previous open intra-articular fracture at the base of the proximal phalanx. All labs and imaging reviewed. Continue broad-spectrum IV antibiotics. Patient will be NWB to LLE. Wound culture pending.   Plan for podiatric surgical intervention tomorrow, primary team please risk stratify.     - The patient will be staffed with Dr. Bjorn Martin, Alicia Ohkristi, [facility]  09/26/22  3:16 PM

## 2022-09-26 NOTE — ED PROVIDER NOTES
Patient was turned over to me by day physician. The podiatry team was actively involved in patient's care. Podiatry call me with CT results to let me know that they would be having patient admitted to the hospitalist and they will be taking the patient to the OR tomorrow for cleanout. I placed a call to the hospitalist for admission.     Wound infection    Domingo admit and antibiotic     Bj Dunbar MD  09/26/22 4731

## 2022-09-27 ENCOUNTER — ANESTHESIA (OUTPATIENT)
Dept: OPERATING ROOM | Age: 67
DRG: 853 | End: 2022-09-27
Payer: MEDICARE

## 2022-09-27 ENCOUNTER — ANESTHESIA EVENT (OUTPATIENT)
Dept: OPERATING ROOM | Age: 67
DRG: 853 | End: 2022-09-27
Payer: MEDICARE

## 2022-09-27 PROBLEM — S92.412B: Status: ACTIVE | Noted: 2022-09-27

## 2022-09-27 PROBLEM — A49.02 MRSA INFECTION: Status: ACTIVE | Noted: 2022-09-27

## 2022-09-27 PROBLEM — L02.612 ABSCESS OF LEFT FOOT: Status: ACTIVE | Noted: 2022-09-27

## 2022-09-27 LAB
ANION GAP SERPL CALCULATED.3IONS-SCNC: 8 MMOL/L (ref 3–16)
BASOPHILS ABSOLUTE: 0.1 K/UL (ref 0–0.2)
BASOPHILS RELATIVE PERCENT: 0.4 %
BUN BLDV-MCNC: 14 MG/DL (ref 7–20)
CALCIUM SERPL-MCNC: 8.7 MG/DL (ref 8.3–10.6)
CHLORIDE BLD-SCNC: 101 MMOL/L (ref 99–110)
CO2: 26 MMOL/L (ref 21–32)
CREAT SERPL-MCNC: 0.9 MG/DL (ref 0.8–1.3)
EOSINOPHILS ABSOLUTE: 0.2 K/UL (ref 0–0.6)
EOSINOPHILS RELATIVE PERCENT: 1.5 %
ESTIMATED AVERAGE GLUCOSE: 131.2 MG/DL
FERRITIN: 530.6 NG/ML (ref 30–400)
GFR AFRICAN AMERICAN: >60
GFR NON-AFRICAN AMERICAN: >60
GLUCOSE BLD-MCNC: 137 MG/DL (ref 70–99)
GLUCOSE BLD-MCNC: 140 MG/DL (ref 70–99)
GLUCOSE BLD-MCNC: 212 MG/DL (ref 70–99)
GLUCOSE BLD-MCNC: 228 MG/DL (ref 70–99)
GLUCOSE BLD-MCNC: 234 MG/DL (ref 70–99)
GLUCOSE BLD-MCNC: 291 MG/DL (ref 70–99)
HBA1C MFR BLD: 6.2 %
HCT VFR BLD CALC: 28.8 % (ref 40.5–52.5)
HEMOGLOBIN: 9.5 G/DL (ref 13.5–17.5)
LYMPHOCYTES ABSOLUTE: 0.9 K/UL (ref 1–5.1)
LYMPHOCYTES RELATIVE PERCENT: 7.3 %
MCH RBC QN AUTO: 29.1 PG (ref 26–34)
MCHC RBC AUTO-ENTMCNC: 33 G/DL (ref 31–36)
MCV RBC AUTO: 88.1 FL (ref 80–100)
MONOCYTES ABSOLUTE: 0.7 K/UL (ref 0–1.3)
MONOCYTES RELATIVE PERCENT: 5.3 %
NEUTROPHILS ABSOLUTE: 10.7 K/UL (ref 1.7–7.7)
NEUTROPHILS RELATIVE PERCENT: 85.5 %
PDW BLD-RTO: 14.5 % (ref 12.4–15.4)
PERFORMED ON: ABNORMAL
PLATELET # BLD: 355 K/UL (ref 135–450)
PMV BLD AUTO: 8.5 FL (ref 5–10.5)
POTASSIUM SERPL-SCNC: 3.9 MMOL/L (ref 3.5–5.1)
RBC # BLD: 3.27 M/UL (ref 4.2–5.9)
SODIUM BLD-SCNC: 135 MMOL/L (ref 136–145)
WBC # BLD: 12.5 K/UL (ref 4–11)

## 2022-09-27 PROCEDURE — 80048 BASIC METABOLIC PNL TOTAL CA: CPT

## 2022-09-27 PROCEDURE — 6370000000 HC RX 637 (ALT 250 FOR IP)

## 2022-09-27 PROCEDURE — 2580000003 HC RX 258

## 2022-09-27 PROCEDURE — 99223 1ST HOSP IP/OBS HIGH 75: CPT | Performed by: INTERNAL MEDICINE

## 2022-09-27 PROCEDURE — 6360000002 HC RX W HCPCS: Performed by: NURSE ANESTHETIST, CERTIFIED REGISTERED

## 2022-09-27 PROCEDURE — 0JBR0ZZ EXCISION OF LEFT FOOT SUBCUTANEOUS TISSUE AND FASCIA, OPEN APPROACH: ICD-10-PCS | Performed by: PODIATRIST

## 2022-09-27 PROCEDURE — 1200000000 HC SEMI PRIVATE

## 2022-09-27 PROCEDURE — 3600000004 HC SURGERY LEVEL 4 BASE: Performed by: PODIATRIST

## 2022-09-27 PROCEDURE — 87206 SMEAR FLUORESCENT/ACID STAI: CPT

## 2022-09-27 PROCEDURE — 83540 ASSAY OF IRON: CPT

## 2022-09-27 PROCEDURE — 2500000003 HC RX 250 WO HCPCS: Performed by: PODIATRIST

## 2022-09-27 PROCEDURE — 7100000000 HC PACU RECOVERY - FIRST 15 MIN: Performed by: PODIATRIST

## 2022-09-27 PROCEDURE — 87070 CULTURE OTHR SPECIMN AEROBIC: CPT

## 2022-09-27 PROCEDURE — 87077 CULTURE AEROBIC IDENTIFY: CPT

## 2022-09-27 PROCEDURE — 94761 N-INVAS EAR/PLS OXIMETRY MLT: CPT

## 2022-09-27 PROCEDURE — 3700000000 HC ANESTHESIA ATTENDED CARE: Performed by: PODIATRIST

## 2022-09-27 PROCEDURE — 3700000001 HC ADD 15 MINUTES (ANESTHESIA): Performed by: PODIATRIST

## 2022-09-27 PROCEDURE — 0J9R0ZZ DRAINAGE OF LEFT FOOT SUBCUTANEOUS TISSUE AND FASCIA, OPEN APPROACH: ICD-10-PCS | Performed by: PODIATRIST

## 2022-09-27 PROCEDURE — 85025 COMPLETE CBC W/AUTO DIFF WBC: CPT

## 2022-09-27 PROCEDURE — 87015 SPECIMEN INFECT AGNT CONCNTJ: CPT

## 2022-09-27 PROCEDURE — 2580000003 HC RX 258: Performed by: NURSE ANESTHETIST, CERTIFIED REGISTERED

## 2022-09-27 PROCEDURE — 94640 AIRWAY INHALATION TREATMENT: CPT

## 2022-09-27 PROCEDURE — 3600000014 HC SURGERY LEVEL 4 ADDTL 15MIN: Performed by: PODIATRIST

## 2022-09-27 PROCEDURE — 84466 ASSAY OF TRANSFERRIN: CPT

## 2022-09-27 PROCEDURE — 82728 ASSAY OF FERRITIN: CPT

## 2022-09-27 PROCEDURE — 7100000001 HC PACU RECOVERY - ADDTL 15 MIN: Performed by: PODIATRIST

## 2022-09-27 PROCEDURE — 97166 OT EVAL MOD COMPLEX 45 MIN: CPT

## 2022-09-27 PROCEDURE — 2700000000 HC OXYGEN THERAPY PER DAY

## 2022-09-27 PROCEDURE — 97530 THERAPEUTIC ACTIVITIES: CPT

## 2022-09-27 PROCEDURE — 88305 TISSUE EXAM BY PATHOLOGIST: CPT

## 2022-09-27 PROCEDURE — 87186 SC STD MICRODIL/AGAR DIL: CPT

## 2022-09-27 PROCEDURE — 87205 SMEAR GRAM STAIN: CPT

## 2022-09-27 PROCEDURE — 2709999900 HC NON-CHARGEABLE SUPPLY: Performed by: PODIATRIST

## 2022-09-27 PROCEDURE — 88311 DECALCIFY TISSUE: CPT

## 2022-09-27 PROCEDURE — 87116 MYCOBACTERIA CULTURE: CPT

## 2022-09-27 PROCEDURE — 6360000002 HC RX W HCPCS

## 2022-09-27 PROCEDURE — 2580000003 HC RX 258: Performed by: INTERNAL MEDICINE

## 2022-09-27 PROCEDURE — 36415 COLL VENOUS BLD VENIPUNCTURE: CPT

## 2022-09-27 PROCEDURE — 94664 DEMO&/EVAL PT USE INHALER: CPT

## 2022-09-27 PROCEDURE — 87102 FUNGUS ISOLATION CULTURE: CPT

## 2022-09-27 RX ORDER — OXYCODONE HYDROCHLORIDE 5 MG/1
5 TABLET ORAL EVERY 4 HOURS PRN
Status: DISCONTINUED | OUTPATIENT
Start: 2022-09-27 | End: 2022-10-05 | Stop reason: HOSPADM

## 2022-09-27 RX ORDER — OXYCODONE HYDROCHLORIDE 5 MG/1
5 TABLET ORAL
Status: DISCONTINUED | OUTPATIENT
Start: 2022-09-27 | End: 2022-09-27 | Stop reason: HOSPADM

## 2022-09-27 RX ORDER — PROCHLORPERAZINE EDISYLATE 5 MG/ML
5 INJECTION INTRAMUSCULAR; INTRAVENOUS
Status: DISCONTINUED | OUTPATIENT
Start: 2022-09-27 | End: 2022-09-27 | Stop reason: HOSPADM

## 2022-09-27 RX ORDER — ACETAMINOPHEN 650 MG/1
650 SUPPOSITORY RECTAL EVERY 6 HOURS PRN
Status: DISCONTINUED | OUTPATIENT
Start: 2022-09-27 | End: 2022-10-05 | Stop reason: HOSPADM

## 2022-09-27 RX ORDER — ACETAMINOPHEN 650 MG/1
650 SUPPOSITORY RECTAL EVERY 4 HOURS PRN
Status: DISCONTINUED | OUTPATIENT
Start: 2022-09-27 | End: 2022-09-27 | Stop reason: RX

## 2022-09-27 RX ORDER — SODIUM CHLORIDE 0.9 % (FLUSH) 0.9 %
5-40 SYRINGE (ML) INJECTION PRN
Status: DISCONTINUED | OUTPATIENT
Start: 2022-09-27 | End: 2022-09-27 | Stop reason: HOSPADM

## 2022-09-27 RX ORDER — SODIUM CHLORIDE 9 MG/ML
INJECTION, SOLUTION INTRAVENOUS PRN
Status: DISCONTINUED | OUTPATIENT
Start: 2022-09-27 | End: 2022-09-27 | Stop reason: HOSPADM

## 2022-09-27 RX ORDER — FENTANYL CITRATE 50 UG/ML
INJECTION, SOLUTION INTRAMUSCULAR; INTRAVENOUS PRN
Status: DISCONTINUED | OUTPATIENT
Start: 2022-09-27 | End: 2022-09-27 | Stop reason: SDUPTHER

## 2022-09-27 RX ORDER — OXYCODONE HYDROCHLORIDE 5 MG/1
10 TABLET ORAL EVERY 4 HOURS PRN
Status: DISCONTINUED | OUTPATIENT
Start: 2022-09-27 | End: 2022-10-05 | Stop reason: HOSPADM

## 2022-09-27 RX ORDER — MEPERIDINE HYDROCHLORIDE 25 MG/ML
12.5 INJECTION INTRAMUSCULAR; INTRAVENOUS; SUBCUTANEOUS EVERY 5 MIN PRN
Status: DISCONTINUED | OUTPATIENT
Start: 2022-09-27 | End: 2022-09-27 | Stop reason: HOSPADM

## 2022-09-27 RX ORDER — BUPIVACAINE HYDROCHLORIDE 5 MG/ML
INJECTION, SOLUTION EPIDURAL; INTRACAUDAL PRN
Status: DISCONTINUED | OUTPATIENT
Start: 2022-09-27 | End: 2022-09-27 | Stop reason: ALTCHOICE

## 2022-09-27 RX ORDER — LABETALOL HYDROCHLORIDE 5 MG/ML
10 INJECTION, SOLUTION INTRAVENOUS
Status: DISCONTINUED | OUTPATIENT
Start: 2022-09-27 | End: 2022-09-27 | Stop reason: HOSPADM

## 2022-09-27 RX ORDER — POLYETHYLENE GLYCOL 3350 17 G/17G
17 POWDER, FOR SOLUTION ORAL DAILY PRN
Status: DISCONTINUED | OUTPATIENT
Start: 2022-09-27 | End: 2022-10-05 | Stop reason: HOSPADM

## 2022-09-27 RX ORDER — HEPARIN SODIUM 5000 [USP'U]/ML
5000 INJECTION, SOLUTION INTRAVENOUS; SUBCUTANEOUS EVERY 8 HOURS SCHEDULED
Status: DISCONTINUED | OUTPATIENT
Start: 2022-09-28 | End: 2022-10-01

## 2022-09-27 RX ORDER — 0.9 % SODIUM CHLORIDE 0.9 %
1000 INTRAVENOUS SOLUTION INTRAVENOUS ONCE
Status: COMPLETED | OUTPATIENT
Start: 2022-09-27 | End: 2022-09-27

## 2022-09-27 RX ORDER — SODIUM CHLORIDE 0.9 % (FLUSH) 0.9 %
5-40 SYRINGE (ML) INJECTION EVERY 12 HOURS SCHEDULED
Status: DISCONTINUED | OUTPATIENT
Start: 2022-09-27 | End: 2022-09-27 | Stop reason: HOSPADM

## 2022-09-27 RX ORDER — SODIUM CHLORIDE 9 MG/ML
INJECTION, SOLUTION INTRAVENOUS CONTINUOUS
Status: DISCONTINUED | OUTPATIENT
Start: 2022-09-27 | End: 2022-09-29

## 2022-09-27 RX ORDER — ONDANSETRON 2 MG/ML
INJECTION INTRAMUSCULAR; INTRAVENOUS PRN
Status: DISCONTINUED | OUTPATIENT
Start: 2022-09-27 | End: 2022-09-27 | Stop reason: SDUPTHER

## 2022-09-27 RX ORDER — HYDRALAZINE HYDROCHLORIDE 20 MG/ML
10 INJECTION INTRAMUSCULAR; INTRAVENOUS
Status: DISCONTINUED | OUTPATIENT
Start: 2022-09-27 | End: 2022-09-27 | Stop reason: HOSPADM

## 2022-09-27 RX ORDER — ONDANSETRON 2 MG/ML
4 INJECTION INTRAMUSCULAR; INTRAVENOUS EVERY 6 HOURS PRN
Status: DISCONTINUED | OUTPATIENT
Start: 2022-09-27 | End: 2022-10-05 | Stop reason: HOSPADM

## 2022-09-27 RX ORDER — ONDANSETRON 4 MG/1
4 TABLET, ORALLY DISINTEGRATING ORAL EVERY 8 HOURS PRN
Status: DISCONTINUED | OUTPATIENT
Start: 2022-09-27 | End: 2022-10-05 | Stop reason: HOSPADM

## 2022-09-27 RX ORDER — OLANZAPINE 5 MG/1
5 TABLET ORAL 2 TIMES DAILY
Status: DISCONTINUED | OUTPATIENT
Start: 2022-09-27 | End: 2022-10-05 | Stop reason: HOSPADM

## 2022-09-27 RX ORDER — PROPOFOL 10 MG/ML
INJECTION, EMULSION INTRAVENOUS PRN
Status: DISCONTINUED | OUTPATIENT
Start: 2022-09-27 | End: 2022-09-27 | Stop reason: SDUPTHER

## 2022-09-27 RX ORDER — LIDOCAINE HYDROCHLORIDE 20 MG/ML
INJECTION, SOLUTION INTRAVENOUS PRN
Status: DISCONTINUED | OUTPATIENT
Start: 2022-09-27 | End: 2022-09-27 | Stop reason: SDUPTHER

## 2022-09-27 RX ORDER — QUETIAPINE FUMARATE 25 MG/1
25 TABLET, FILM COATED ORAL NIGHTLY
Status: DISCONTINUED | OUTPATIENT
Start: 2022-09-27 | End: 2022-10-05 | Stop reason: HOSPADM

## 2022-09-27 RX ORDER — ONDANSETRON 2 MG/ML
4 INJECTION INTRAMUSCULAR; INTRAVENOUS
Status: DISCONTINUED | OUTPATIENT
Start: 2022-09-27 | End: 2022-09-27 | Stop reason: HOSPADM

## 2022-09-27 RX ORDER — FENTANYL CITRATE 50 UG/ML
25 INJECTION, SOLUTION INTRAMUSCULAR; INTRAVENOUS EVERY 5 MIN PRN
Status: DISCONTINUED | OUTPATIENT
Start: 2022-09-27 | End: 2022-09-27 | Stop reason: HOSPADM

## 2022-09-27 RX ORDER — IBUPROFEN 400 MG/1
400 TABLET ORAL ONCE
Status: COMPLETED | OUTPATIENT
Start: 2022-09-27 | End: 2022-09-27

## 2022-09-27 RX ORDER — ACETAMINOPHEN 325 MG/1
650 TABLET ORAL EVERY 6 HOURS PRN
Status: DISCONTINUED | OUTPATIENT
Start: 2022-09-27 | End: 2022-10-05 | Stop reason: HOSPADM

## 2022-09-27 RX ORDER — SODIUM CHLORIDE 9 MG/ML
INJECTION, SOLUTION INTRAVENOUS CONTINUOUS PRN
Status: DISCONTINUED | OUTPATIENT
Start: 2022-09-27 | End: 2022-09-27 | Stop reason: SDUPTHER

## 2022-09-27 RX ORDER — LIDOCAINE HYDROCHLORIDE 10 MG/ML
INJECTION, SOLUTION EPIDURAL; INFILTRATION; INTRACAUDAL; PERINEURAL PRN
Status: DISCONTINUED | OUTPATIENT
Start: 2022-09-27 | End: 2022-09-27 | Stop reason: HOSPADM

## 2022-09-27 RX ORDER — IPRATROPIUM BROMIDE AND ALBUTEROL SULFATE 2.5; .5 MG/3ML; MG/3ML
1 SOLUTION RESPIRATORY (INHALATION) ONCE
Status: COMPLETED | OUTPATIENT
Start: 2022-09-27 | End: 2022-09-27

## 2022-09-27 RX ADMIN — SODIUM CHLORIDE: 9 INJECTION, SOLUTION INTRAVENOUS at 14:55

## 2022-09-27 RX ADMIN — SODIUM CHLORIDE 1000 ML: 9 INJECTION, SOLUTION INTRAVENOUS at 11:45

## 2022-09-27 RX ADMIN — ACETAMINOPHEN 650 MG: 325 SUPPOSITORY RECTAL at 03:19

## 2022-09-27 RX ADMIN — VANCOMYCIN HYDROCHLORIDE 750 MG: 10 INJECTION, POWDER, LYOPHILIZED, FOR SOLUTION INTRAVENOUS at 18:21

## 2022-09-27 RX ADMIN — SODIUM CHLORIDE: 9 INJECTION, SOLUTION INTRAVENOUS at 13:18

## 2022-09-27 RX ADMIN — CEFEPIME HYDROCHLORIDE 2000 MG: 2 INJECTION, POWDER, FOR SOLUTION INTRAMUSCULAR; INTRAVENOUS at 14:34

## 2022-09-27 RX ADMIN — QUETIAPINE FUMARATE 25 MG: 25 TABLET ORAL at 21:33

## 2022-09-27 RX ADMIN — IPRATROPIUM BROMIDE AND ALBUTEROL SULFATE 1 AMPULE: .5; 2.5 SOLUTION RESPIRATORY (INHALATION) at 03:00

## 2022-09-27 RX ADMIN — ACETAMINOPHEN 650 MG: 325 TABLET, FILM COATED ORAL at 21:33

## 2022-09-27 RX ADMIN — PHENYLEPHRINE HYDROCHLORIDE 100 MCG: 10 INJECTION, SOLUTION INTRAMUSCULAR; INTRAVENOUS; SUBCUTANEOUS at 15:20

## 2022-09-27 RX ADMIN — FENTANYL CITRATE 50 MCG: 50 INJECTION, SOLUTION INTRAMUSCULAR; INTRAVENOUS at 15:04

## 2022-09-27 RX ADMIN — PHENYLEPHRINE HYDROCHLORIDE 100 MCG: 10 INJECTION, SOLUTION INTRAMUSCULAR; INTRAVENOUS; SUBCUTANEOUS at 15:25

## 2022-09-27 RX ADMIN — OLANZAPINE 5 MG: 5 TABLET, FILM COATED ORAL at 21:33

## 2022-09-27 RX ADMIN — VANCOMYCIN HYDROCHLORIDE 750 MG: 10 INJECTION, POWDER, LYOPHILIZED, FOR SOLUTION INTRAVENOUS at 03:44

## 2022-09-27 RX ADMIN — IBUPROFEN 400 MG: 400 TABLET, FILM COATED ORAL at 04:45

## 2022-09-27 RX ADMIN — LIDOCAINE HYDROCHLORIDE 80 MG: 20 INJECTION, SOLUTION INTRAVENOUS at 15:04

## 2022-09-27 RX ADMIN — CEFEPIME HYDROCHLORIDE 2000 MG: 2 INJECTION, POWDER, FOR SOLUTION INTRAMUSCULAR; INTRAVENOUS at 03:17

## 2022-09-27 RX ADMIN — PROPOFOL 100 MG: 10 INJECTION, EMULSION INTRAVENOUS at 15:04

## 2022-09-27 RX ADMIN — SODIUM CHLORIDE: 9 INJECTION, SOLUTION INTRAVENOUS at 18:23

## 2022-09-27 RX ADMIN — ONDANSETRON 4 MG: 2 INJECTION INTRAMUSCULAR; INTRAVENOUS at 15:33

## 2022-09-27 ASSESSMENT — PAIN SCALES - GENERAL
PAINLEVEL_OUTOF10: 0
PAINLEVEL_OUTOF10: 0

## 2022-09-27 NOTE — BRIEF OP NOTE
Brief Postoperative Note      Patient: Gray Palomares  YOB: 1955  MRN: [id number]    Date of Procedure: 9/27/2022    Pre-Op Diagnosis: LEFT FOOT GANGRENE    Post-Op Diagnosis: Same       Procedure(s):  LEFT FOOT DEBRIDEMENT INCISION AND DRAINAGE, BONE BIOPSY    Surgeon(s):  Laquita Garcia DPM    Assistant:  Surgical Assistant: Ranjit Dhaliwal  Resident: Pino Mcclendon DPM    Anesthesia: Choice    Hemostasis: anatomic dissection    Injectables: pre-op 20cc of 1% lidocaine plain and post-op 10cc of 0.5% marcaine plain     Materials: 0.5\" iodoform packing      Estimated Blood Loss (mL): Minimal    Complications: None    Specimens:   ID Type Source Tests Collected by Time Destination   1 : deep tissue culture Tissue Tissue CULTURE, FUNGUS, CULTURE, TISSUE, CULTURE WITH SMEAR, ACID FAST BACILLIUS Laquita aGrcia DPM 9/27/2022 1516    A : fracture fragment proximal phalanx Tissue Tissue SURGICAL PATHOLOGY Laquita Garcia DPM 9/27/2022 1523        Implants:  * No implants in log *      Drains: * No LDAs found *    Findings: Fluctuance noted to the plantar and dorsal aspect of the 1st metatarsal head. Wound tracked to the left first intermetatarsal space. Approximately 2cc of purulent drainage noted to the first intermetatarsal space. No more purulence was encountered. Soft tissue structures noted to be unhealthy and devitalized at the lateral aspect of the left hallux. Patient will likely need hallux amputation. Fracture fragment was removed. DISPO: S/P incision and drainage of left foot. Incision was left open and packed with 0.5\" iodoform packing. Hallux amputation will be likely. Patient will be transferred back to floor and will return to the OR for further debridement on Friday.     Electronically signed by Pino Mcclendon DPM on 9/27/2022 at 3:37 PM

## 2022-09-27 NOTE — PROGRESS NOTES
Clinical Pharmacy Progress Note  Medication History      List of of current medications patient is taking is complete. Home Medication list in EPIC updated to reflect changes noted below.      Source of information: medication list from [facility]     No changes made to med list from when admitting nurse updated list.  Unclear when pt had last doses of PRN meds - marked as \"unknown\" last dose times, but pt has active orders for them at [facility]     Please call with questions--  Thanks--  Jet Schilling, PharmD, BCPS, BCGP  [id number] ([facility])   9/27/2022 11:40 AM      Current Outpatient Medications   Medication Instructions    acetaminophen (TYLENOL) 650 mg, Oral, EVERY 8 HOURS PRN    Ergocalciferol (VITAMIN D2) 50 MCG (2000 UT) TABS 3 tablets, Oral, DAILY    ferrous sulfate (IRON 325) 325 mg, Oral, DAILY WITH BREAKFAST    folic acid (FOLVITE) 1 mg, Oral, DAILY    insulin glargine (LANTUS) 13 Units, SubCUTAneous, NIGHTLY    lisinopril (PRINIVIL;ZESTRIL) 20 mg, Oral, DAILY    melatonin 6 mg, Oral, Nightly    metFORMIN (GLUCOPHAGE) 500 mg, Oral, 2 TIMES DAILY WITH MEALS    OLANZapine (ZYPREXA) 5 mg, Oral, 2 times daily    QUEtiapine (SEROQUEL) 25 mg, Oral, Nightly

## 2022-09-27 NOTE — PROGRESS NOTES
Perfect Serve secure message sent to Dr. Tyrel Carreno MD, as follows:  Please note, I received a call from [facility] (spoke with Dora/Microbiology) who noted that patient had a positive culture/MRSA of the left foot and requires contact precautions. I placed the order for MRSA Contact isolation precautions. Please advise of any new orders/needs. Thank you!

## 2022-09-27 NOTE — ANESTHESIA POSTPROCEDURE EVALUATION
Department of Anesthesiology  Postprocedure Note    Patient: Joao Astorga  MRN: [id number]  YOB: 1955  Date of evaluation: 9/27/2022      Procedure Summary     Date: 09/27/22 Room / Location: [address] [place] [address]    Anesthesia Start: 6308 Anesthesia Stop: 9668    Procedure: LEFT FOOT DEBRIDEMENT INCISION AND DRAINAGE, BONE BIOPSY (Left: Foot) Diagnosis:       Gangrene of left foot ([address]      (LEFT FOOT GANGRENE)    Surgeons: Alexander Isaac DPM Responsible Provider: Gerhardt Roach, MD    Anesthesia Type: general ASA Status: 3          Anesthesia Type: No value filed.     Trinity Phase I: Trinity Score: 9    Trinity Phase II:        Anesthesia Post Evaluation    Patient location during evaluation: PACU  Patient participation: complete - patient cannot participate  Level of consciousness: awake  Airway patency: patent  Nausea & Vomiting: no nausea and no vomiting  Complications: no  Cardiovascular status: hemodynamically stable  Respiratory status: acceptable  Hydration status: euvolemic  Multimodal analgesia pain management approach

## 2022-09-27 NOTE — PROGRESS NOTES
PACU Transfer to Floor Note    Procedure(s):  LEFT FOOT DEBRIDEMENT INCISION AND DRAINAGE, BONE BIOPSY    Current Allergies: Patient has no known allergies. Pt meets criteria as per Leah Score and ASPAN Standards to transfer to next phase of care. Recent Labs     09/27/22  1121 09/27/22  1544   POCGLU 212* 137*       Vitals:    09/27/22 1615   BP: (!) 143/65   Pulse: 88   Resp: 16   Temp: 97.4 °F (36.3 °C)   SpO2: 93%     Vitals within 20% of pt's admission vitals as per LEAH SCORE    SpO2: 93 %    O2 Flow Rate (L/min): 3 L/min      Intake/Output Summary (Last 24 hours) at 9/27/2022 1620  Last data filed at 9/27/2022 1534  Gross per 24 hour   Intake 504.26 ml   Output 205 ml   Net 299.26 ml       Pain assessment:  none    Pain Level: 0    Patient was assessed for alterations to skin integrity. There were not alterations observed. Is patient incontinent: no    Patient has all belongings at discharge from PACU. Handoff report called to receiving RN, Rae Rey.   Family updated and directed to pt room 6345      9/27/2022 4:20 PM

## 2022-09-27 NOTE — PROGRESS NOTES
Pharmacy Note - Extended Infusion Beta-Lactam Adjustment    Cefepime ordered for treatment of SSTI. Per [facility] Extended Infusion Beta-Lactam Policy, Cefepime will be changed to 2000 mg q12hr Ei. Estimated Creatinine Clearance: Estimated Creatinine Clearance: 73 mL/min (based on SCr of 1.1 mg/dL). Dialysis Status, YURI, CKD: None  BMI: Body mass index is 25.42 kg/m². Rationale for Adjustment: Agent is renally eliminated and demonstrates time-dependent effect on bacterial eradication. Extended-infusion dosing strategy aims to enhance microbiologic and clinical efficacy. Pharmacy will continue to monitor renal function, cultures and sensitivities (where available) and adjust dose as necessary. Please call with any questions.     Julius Hall, PharmD  Main Pharmacy: [id number]  9/26/2022 9:15 PM

## 2022-09-27 NOTE — SIGNIFICANT EVENT
A rapid response was called around 2:48AM. The ICU and wards resident teams rapidly responded. Briefly patient is a 44-year-old male with PMHx of HTN and T2DM who was admitted for left hallux infection 2/2 traumatic injury currently on vancomycin and cefepime with planned I/D with podiatry later in the morning. Per nursing, patient has been saturating well on room air however he began becoming hypoxic with O2 sat dropping to around 88 then to the 50s. Patient also became confused and was complaining of SOB. Patient has also been febrile around 102, but HR and BP were otherwise wnl. A CXR on 9/26 showed bilateral peribronchial thickening representing bronchitis or asthma. Upon arrival, wards resident team was already present and placed him on 15L HFNC with improvement in O2 saturation. Patient was oriented to self and place, which per nursing is at his baseline. There was bilateral lung wheezing on auscultation. Respiratory distress was likely due to sepsis. Following several minutes patient was weaning down to 5L O2 NC and was saturating to the 90s. DuoNebs and Tylenol were ordered.

## 2022-09-27 NOTE — PROGRESS NOTES
Went into patients room to hang ATB around 0240. Patient noted to be unarousable./ Vitals checked Patient noted with a temp of 103.5 BP-97/65 P-105 SpO2-72 RA. I have placed him on 3 L nc. Patient still sating in the 80 on 3L. Gradually had to increase O2 to 12L. Ice packs placed for fever. Rapid response called. Respiratory administered breathing treatment. New order placed for APAP suppository, which was given. Temp only went down to 102. MD notified with new order to administer advil. Temp finally down to 99.7. Patient continues on 1.5L o2 via nasal canula.

## 2022-09-27 NOTE — PROGRESS NOTES
Perfect Serve secure message received from Dr. Alix Long, whom notes speaking with the family, per their request (see my previous note). Dr. Alix Long notes notifying social work, also noting they are asking to speak with the podiatrist/surgeon, whom Dr. Alix Long also noted was contacted.

## 2022-09-27 NOTE — PROGRESS NOTES
Perfect Serve secure message sent to Dr. Gabrielle Varma MD, as follows:  Please note, patient's family is here at bedside. They would like to see you/the attending physician please. Likewise, they are requesting to see the ER admitting physician they note was Dr. Nakul Conroy. They would like to see a  and feel patient is not safe at the facility he came to [facility] [employer] [employer] from (they note was [facility]). I placed a consult for Spiritual Services (FYI) per the patient's family's request for prayer. Please advise of any new orders. Thank you!

## 2022-09-27 NOTE — PROGRESS NOTES
Internal Medicine Progress Note    Patient Name: Mara Hilliard   Patient : 1955   Date: 2022   Admit Date: 2022     CC: Foot Pain (Pt reports he injured left foot last week and it was sutured. Today nurse from [facility] noticed swelling and redness around the foot. Pain 8/10)       Interval History: Rapid Response called in at 0248 for hypoxia and fever. 15LHFNC satisfied O2 requirement, and was weaned down to 5L. Tylenol and duonebs also used. For Left Foot I&D today in OR. He complains of pain in his L foot, but has no other complaints. HPI: Mara Hilliard is a 74 y.o. M who presents from [address] [place] [address] for worsening of a recent foot injury. Notably, he was seen on 22 in the ER for an open intra-articular fracture thought the base of the first digit on the left. It was treated at bedside with flushing and repair by podiatry at that time, and the patient was given Ancef in the ER and sent home on Keflex. Patient did not follow up with Dr. Frantz Osorio outpatient as he was directed. Patient is Alert and Orientated x 2 (to person, place, but not year) and states he has trouble figuring out words. He is unable to tell me about his PMHx. His daughters were present when Podiatry saw the patient, and reportedly the family states they are unsure if the patient's facility did dressing changes or administered his antibiotics. Patient is a poor historian and is unable to corrobarate taking meds or not. Patient does endorse fevers. Family noticed redness and drainage from his foot wound. Patient complains of pain, even when not moving his foot. In the ED: Podiatry performed a bedside I&D and obtained wound cultures. ROS:  As per interval history above.       Vital Signs:  Patient Vitals for the past 8 hrs:   BP Temp Temp src Pulse Resp SpO2   22 0611 -- 99.7 °F (37.6 °C) Oral -- -- 96 %   22 0346 136/83 (!) 102.3 °F (39.1 °C) Oral 98 19 96 %   22 0300 -- -- -- 100 20 96 %   09/27/22 0039 (!) 133/59 (!) 103.5 °F (39.7 °C) Oral 100 18 (!) 74 %       Physical Exam:  Physical Exam  Eyes:      Extraocular Movements: Extraocular movements intact. Cardiovascular:      Rate and Rhythm: Normal rate and regular rhythm. Pulmonary:      Effort: Pulmonary effort is normal.      Breath sounds: Normal breath sounds. Abdominal:      General: Abdomen is flat. Palpations: Abdomen is soft. Musculoskeletal:         General: Signs of injury present. Cervical back: Normal range of motion. Skin:     General: Skin is warm and dry. Neurological:      Mental Status: He is alert. Comments: Oriented to person and place, not year        Intake/Output Summary (Last 24 hours) at 9/27/2022 0802  Last data filed at 9/26/2022 1833  Gross per 24 hour   Intake 3105.57 ml   Output 200 ml   Net 2905.57 ml        Medications:   cefepime  2,000 mg IntraVENous Q12H    insulin lispro  0-4 Units SubCUTAneous TID WC    insulin lispro  0-4 Units SubCUTAneous Nightly    metoprolol tartrate  25 mg Oral BID    vancomycin  750 mg IntraVENous Q12H       dextrose        acetaminophen, glucose, dextrose bolus **OR** dextrose bolus, glucagon (rDNA), dextrose     Labs:  CBC:   Recent Labs     09/26/22  1342   WBC 13.5*   HGB 9.6*   HCT 29.8*      MCV 88.5       Renal:    Recent Labs     09/26/22  1342      K 4.1      CO2 26   BUN 19   CREATININE 1.1   GLUCOSE 128*   CALCIUM 9.2   ANIONGAP 12       Hepatic:   Recent Labs     09/26/22  1342   AST 17   ALT 17   BILITOT <0.2   PROT 7.5   LABALBU 3.6   ALKPHOS 99       Troponin: Invalid input(s): TROPONIN    Lactic acid: Invalid input(s): LACTICACID    BNP: No results for input(s): BNP in the last 72 hours. Pro-BNP: No results for input(s): PROBNP in the last 72 hours. Lipids: No results for input(s): CHOL, TRIG, HDL, LDLCALC, VLDL in the last 72 hours.     ABGs:  No results for input(s): PHART, NYW0BZE, PO2ART, VOR9ZLU, BEART, THGBART, X6VWNMRI, PFC5IQK in the last 72 hours. VBGs: No results for input(s): PHVEN, EXK7JYG, PO2VEN in the last 72 hours. INR:   Recent Labs     09/26/22  1342   INR 1.18*     aPTT: No results for input(s): APTT in the last 72 hours. Procalcitonin: No results for input(s): PROCAL in the last 72 hours. CRP:   Recent Labs     09/26/22  1342   .6*     ESR: No results for input(s): ESR in the last 72 hours. Radiology:  XR CHEST (2 VW)   Final Result      Bilateral peribronchial thickening representing bronchitis or asthma. No acute pulmonary consolidation. CT FOOT LEFT WO CONTRAST   Final Result   Impression: Small locules of soft tissue gas adjacent to the major fracture fragment of the left great toe. Correlation for open fracture versus soft tissue infection recommended. XR FOOT LEFT (MIN 3 VIEWS)   Final Result   Impression: Increased interval displacement of the previously noted fracture fragment of the great toe. No discrete soft tissue gas. Assessment and Plan:  Gray Palomares is a 75 yo M who presents from facility for worsening of recent foot injury treated here at [facility] ER. L Hallux Infection 2/2 Traumatic Injury  Sepsis  Meets SIRS criteria   Bedside treated in ER on 9/17/22, unknown if complaint with wound dressings and Abx's since then  Patient febrile and hypertensive. ESR 90  -CT Foot: \"Small locules of soft tissue gas adjacent to the major fracture fragment of the left great toe. Correlation for open fracture versus soft tissue infection recommended\"  -Bedside I&D performed 9/26/22 in ER. Plan for OR on 9/27/22  -Vanc and Cefepime  -non-weight bearing for now  -wound culture  -blood cultures   -low-risk patient for intermediate-risk surgery  -PT/INR: 15.0 / 1.18  -EKG: performed  -CXR: \"Bilateral peribronchial thickening representing bronchitis or asthma. No acute pulmonary consolidation. \"  -OR today 9/27/22     Normocytic Anemia  Hg 9.6  -type and screen  -iron studies post-surgery: Iron, TIBC, Transferrin, Ferritin     Hypertension  -home lisinopril     Diabetes mellitus  -home insulin  -HgA1c: awaiting new one  -glucose well-controlled in hospital   -hypoglycemia protocol  -corrective low-dose algorithm      Unspecified psychotic disorder  -home olanzapine re-ordered  -home quetiapine re-ordered     DVT PPx:   Diet:  Diet NPO Exceptions are: Sips of Water with Meds   Code status:  Full Code   ELOS: >3 nights  Barriers to discharge: I&D  Disposition  - Preadmission: [facility]  - Current: [facility]  - Upon discharge: TBA    Will discuss with attending physician Dr. Cody Fisher MD.     Adela Bear MD  Internal Medicine, PGY-1    Patient seen and examined, labs and imaging studies reviewed, agree with assessment and plan as outlined above. Continue with current care and plan. Discussed case with patients nurse, discussed case with care team, discussed plan.       MD Eric Holden

## 2022-09-27 NOTE — PROGRESS NOTES
Occupational Therapy  Facility/Department: [address] [place] [address]  Occupational Therapy Initial Assessment/Tx Note    Name: Kenny Li  : 1955  MRN: [id number]  Date of Service: 2022    Assessment: Baseline unknown. Pt presented from SNF but may have resided at home independently prior to recent admission. Currently, cognition is impaired and pt requires assist for bed mobility, sitting, and ADLs. He is NWB to L foot. It was not safe to attempt stance/transfer today (+pt leaving for OR after session), but anticipate two person assist needed. Recommend continued inpt OT/PT at d/c. Discharge Recommendations: Kenny Li scored a 13/24 on the AM-PAC ADL Inpatient form. Current research shows that an AM-PAC score of 17 or less is typically not associated with a discharge to the patient's home setting. Based on the patient's AM-PAC score and their current ADL deficits, it is recommended that the patient have 3-5 sessions per week of Occupational Therapy at d/c to increase the patient's independence. Please see assessment section for further patient specific details. OT Equipment Recommendations  Equipment Needed: No     Treatment Diagnosis: Decreased activity tolerance, impaired ADLs and mobility      Assessment   Performance deficits / Impairments: Decreased functional mobility ; Decreased ADL status; Decreased endurance;Decreased balance;Decreased cognition  Treatment Diagnosis: Decreased activity tolerance, impaired ADLs and mobility  Decision Making: Medium Complexity  REQUIRES OT FOLLOW-UP: Yes  Activity Tolerance  Activity Tolerance: Treatment limited secondary to decreased cognition        Plan  If pt discharges prior to next tx, this note will serve as d/c summary.  Continue per POC if pt does not d/c.     Plan  Times per Week: 2-5x  Times per Day: Daily  Current Treatment Recommendations: Balance training, Functional mobility training, Endurance training, Gait training, Cognitive reorientation, Safety education & training, Patient/Caregiver education & training, Equipment evaluation, education, & procurement, Self-Care / ADL     Restrictions  Position Activity Restriction  Other position/activity restrictions: NWB L foot    Subjective   General  Chart Reviewed: Yes  Additional Pertinent Hx: 74 y.o. M admitted  from nursing facility 9/26 with sepsis due to Left hallux infection 2/2 recent traumatic open fracture. s/p bedside I&D, plan for OR 9/27 for I&D. NWB per podiatry. PMHx includes DM, psychotic disorder with hallucinations  Family / Caregiver Present: No  Referring Practitioner: Gail  Subjective  Subjective: Pt in bed on arrival. Lethargic, but wakes to voice, quick to fall back to sleep. When attempting to inquire about PLOF, pt responded \"I don't really feel like thinking that much right now. \"  General Comment  Comments: No c/o pain     Social/Functional History  Social/Functional History  Type of Home: Facility ([facility] - most recently skilled, baseline home v. LTC?)  Additional Comments: Admit date at [facility] listed as 8/4, CM able to determine pt was skilled. PLOF unknown. Pt reports typically living in a house with a friend; otherwise, unable/unwilling to answer questions. Objective              Safety Devices  Type of Devices: Call light within reach; Bed alarm in place;Nurse notified; Left in bed  Bed Mobility Training  Bed Mobility Training: Yes  Supine to Sit: Moderate assistance (max cues)  Sit to Supine: Moderate assistance (max cues)  Scooting: Maximum assistance;Assist X2 (able to scoot effortfully at EOB with CGA, cues; scooted minimally in supine toward HOB, ultimately max x2)  Balance  Sitting: Impaired (mod assist initially progressing to CGA; EOB 8 min)  Transfer Training  Transfer Training:  (not assessed secondary to impaired balance in sitting and impaired cognition)        ADL  UE Dressing:  Moderate assistance  UE Dressing Skilled Clinical Factors: gown  LE Dressing: Dependent/Total       Vision  Vision: Within Functional Limits  Hearing  Hearing: Within functional limits  Cognition  Overall Cognitive Status: Exceptions  Cognition Comment: lethargic, more alert sitting up, follows simple commands with increased time and repetition, impaired memory, attention, sequencing  Orientation  Overall Orientation Status: Impaired (oriented to self only)       Education Given To: Patient  Education Provided: Role of Therapy;Plan of Care;Precautions; ADL Adaptive Strategies;Transfer Training;Orientation  Education Method: Verbal  Barriers to Learning: Cognition  Education Outcome: Unable to demonstrate understanding; Unable to verbalize;Continued education needed              AM-PAC Score        AM-PAC Inpatient Daily Activity Raw Score: 13 (09/27/22 1042)  AM-PAC Inpatient ADL T-Scale Score : 32.03 (09/27/22 1042)  ADL Inpatient CMS 0-100% Score: 63.03 (09/27/22 1042)  ADL Inpatient CMS G-Code Modifier : CL (09/27/22 1042)      Goals  Short Term Goals  Time Frame for Short term goals: by D/C  Short Term Goal 1: Maintain sitting balance 20 min with spvn - Not met  Short Term Goal 2: Perform all grooming tasks mod I, set up - Not met  Short Term Goal 3: Tolerate/Attempt stance at walker compliant with NWB L foot to progress mobility - Not met       Therapy Time   Individual Concurrent Group Co-treatment   Time In 1028         Time Out 1051         Minutes 23          Timed Code Tx Min: 8  Total Tx Time: 23       Carol Astorga, OT

## 2022-09-27 NOTE — CONSULTS
Infectious Diseases Inpatient Consult Note    RESIDENT NOTE - reviewed / edited, attending note at bottom    Reason for Consult:   L traumatic foot infection  Requesting Physician:   Dr Zuleyma Barber   Primary Care Physician:  Nikolai Bhatt DO  History Obtained From:   Pt, EPIC    Admit Date: 9/26/2022  Hospital Day: 2    CHIEF COMPLAINT:    Foot pain   Chief Complaint   Patient presents with    Foot Pain     Pt reports he injured left foot last week and it was sutured. Today nurse from [facility] noticed swelling and redness around the foot. Pain 8/10       HISTORY OF PRESENT ILLNESS:      Sandi Carrillo is a 74 y.o. M with hx DM, HTN, psychiatric d/o who presents from [address] [place] [address] for worsening of a recent foot injury. Notably, he was seen on 9/17/22 in the ER for an open intra-articular fracture thought the base of the first digit on the left. It was treated at bedside with flushing and repair by podiatry at that time, and the patient was given Ancef in the ER and sent home on Keflex. Patient was alert and orientated x 2 (to person, place, but not year) and states he has trouble figuring out words. He is unable to tell me about his PMHx. Patient is a poor historian and is unable to corrobarate taking meds or not. Patient does endorse fevers. Family noticed redness and drainage from his foot wound. Patient complains of pain, even when not moving his foot. In the ED Vitals Temp 101.2; /57; HR 89; Resp 18; SpO2 95% RA                   Labs: WBC 13.5; .6; Cr 1.1; sed rate 90    Podiatry performed I&D and wound cultures were obtained    Past Medical History:    Past Medical History:   Diagnosis Date    Altered mental status     Diabetes ([facility]     Hypertension     Iron deficiency anemia     Psychotic disorder with hallucinations ([facility]        Past Surgical History:    History reviewed. No pertinent surgical history.     Current Medications:     sodium chloride  1,000 mL IntraVENous Once cefepime  2,000 mg IntraVENous Q12H    insulin lispro  0-4 Units SubCUTAneous TID WC    insulin lispro  0-4 Units SubCUTAneous Nightly    vancomycin  750 mg IntraVENous Q12H       Allergies:  Patient has no known allergies. Social History:    TOBACCO:    No   ETOH:    Stopped   DRUGS:   No   MARITAL STATUS:   Single   OCCUPATION:   N/a    Patient lives in [facility]    Family History:   No immunodeficiency    REVIEW OF SYSTEMS:    No fever / chills / sweats. No weight loss. No visual change, eye pain, eye discharge. No oral lesion, sore throat, dysphagia. Denies cough / sputum. Denies chest pain, palpitations. Denies n / v / abd pain. No diarrhea. Denies dysuria or change in urinary function. Denies joint swelling or pain. No myalgia, arthralgia. Denies skin changes, itching  Denies focal weakness, sensory change or other neurologic symptom    Denies new / worse depression, psychiatric symptoms    PHYSICAL EXAM:      Vitals:    BP (!) 110/58   Pulse 90   Temp 98.7 °F (37.1 °C) (Oral)   Resp 18   Ht 6' (1.829 m)   Wt 187 lb 6.4 oz (85 kg)   SpO2 98%   BMI 25.42 kg/m²     GENERAL: No apparent distress.     HEENT: Membranes moist, no oral lesion, PERRL  NECK:  Supple, no lymphadenopathy  LUNGS: Clear b/l, no rales, no dullness  CARDIAC: RRR, no murmur appreciated  ABD:  + BS, soft / NT  EXT:  No rash, no edema, no lesions  NEURO: No focal neurologic findings  PSYCH: Oriented only to self, not to place or time  LINES:  Peripheral iv    DATA:    Lab Results   Component Value Date    WBC 13.5 (H) 09/26/2022    HGB 9.6 (L) 09/26/2022    HCT 29.8 (L) 09/26/2022    MCV 88.5 09/26/2022     09/26/2022     Lab Results   Component Value Date    CREATININE 1.1 09/26/2022    BUN 19 09/26/2022     09/26/2022    K 4.1 09/26/2022     09/26/2022    CO2 26 09/26/2022       Hepatic Function Panel:   Lab Results   Component Value Date/Time    ALKPHOS 99 09/26/2022 01:42 PM    ALT 17 09/26/2022 01:42 PM    AST 17 09/26/2022 01:42 PM    PROT 7.5 09/26/2022 01:42 PM    BILITOT <0.2 09/26/2022 01:42 PM    LABALBU 3.6 09/26/2022 01:42 PM       Micro:  Wound culture positive for 3+ Gram cocci  Blood cultures pending    Imaging:   (9/27)CT foot Small locules of soft tissue gas adjacent to the major fracture fragment of the left great toe. Correlation for open fracture versus soft tissue infection recommended    IMPRESSION:    L Hallux Infection 2/2 Traumatic Injury  Bedside treated in ER on 9/17/22, unknown if complaint with wound dressings and Abx's since then  Patient febrile and hypertensive. ESR 90  -CT Foot: \"Small locules of soft tissue gas adjacent to the major fracture fragment of the left great toe. Correlation for open fracture versus soft tissue infection recommended\"  -Bedside I&D performed 9/26/22 in ER. Plan for OR on 9/27/22  -started on Vanc and Cefepime  -non-weight bearing for now  -wound culture positive for 3+  -follow up blood cultures       RECOMMENDATIONS:  Continue vancomycin and cefepime  Follow up after OR today      Discussed with Dr. Danitza Murillo MD PGY-1     Addendum to Resident Consult note:  Pt seen, examined and evaluated. I have reviewed the current history, physical findings, labs and assessment and plan and agree with note as documented by resident (Dr. Roseann Jennings). 73 yo man with hx DM, HTN, psych d/o  Lives in [facility]    Pt sustained L hallux injury (running across the road), sustained open fx. Seen in [facility] ED 9/17, wound sutured, given po keflex. He developed swelling, pain, drainage     Presented to ED on 9/26 - T 101.2, Cr 13.5  X-ray - displaced fracture L hallux  CT - ST gas adjacent to hallux fracture fragment  Wound cult sent - GS 3+GPC  Admit,, started on Vancomycin + Cefepime  Seen by Podiatry, plan for I & D    Today 9/27 - afeb, WBC 12/5.    OR resection L hallux, noted to have fluctuance in plantar and dorsal aspect of 1st MT head  L foot with dressing     IMP/  L hallux open fracture with infection   - x-ray with gas   - wound cult - MRSA, C striatum   - OR purulence    REC/   Cont Vancomycin + Cefepime for now  Will f/u on BC, wound cult  Wound care and return to OR per Podiatry    Anticipate need iv antibiotics after discharge given findings at surgery today    Medical Decision Making: The following items were considered in medical decision making:  Discussion of patient care with other providers  Review / order clinical lab tests  Review / order radiology tests  Review / order other diagnostic tests/interventions  Microbiology cultures and other micro tests reviewed      Risk of Complications/Morbidity: High   Illness(es)/ Infection present that pose threat to bodily function. There is potential for severe exacerbation of infection/side effects of treatment.   Therapy requires intensive monitoring for antimicrobial agent toxicity    Discussed with pt, sister   Serena Her MD

## 2022-09-27 NOTE — PROGRESS NOTES
Perfect Serve secure message sent to Dr. Mendel Mimes, MD, as follows:  Please note, patient is having surgery/is NPO. May we hold his sliding scale insulin for FSBS 234? Please advise. Thank you!

## 2022-09-27 NOTE — PROGRESS NOTES
Patient arrived to PACU post LEFT FOOT DEBRIDEMENT INCISION AND DRAINAGE, BONE BIOPSY - Left with Dr. Cassy Duran. VSS on arrival. CRNA gave PACU RN report at bedside stating no complications during procedure. Dressing to surgical site clean, dry and intact. Patient shows no signs of pain at this time. Will continue to monitor.

## 2022-09-27 NOTE — PLAN OF CARE
Patient remains free from falls and injury thus far this shift. No new skin issues noted. No new physical injury noted. Problem: Safety - Adult  Goal: Free from fall injury  9/27/2022 0126 by Cole Caromna RN  Outcome: Progressing     Problem: Skin/Tissue Integrity  Goal: Absence of new skin breakdown  Description: 1. Monitor for areas of redness and/or skin breakdown  2. Assess vascular access sites hourly  3. Every 4-6 hours minimum:  Change oxygen saturation probe site  4. Every 4-6 hours:  If on nasal continuous positive airway pressure, respiratory therapy assess nares and determine need for appliance change or resting period.   9/27/2022 0126 by Cole Carmona RN  Outcome: Progressing     Problem: ABCDS Injury Assessment  Goal: Absence of physical injury  9/27/2022 0126 by Cole Carmona RN  Outcome: Progressing

## 2022-09-27 NOTE — CONSULTS
Clinical Pharmacy Progress Note    Vancomycin - Management by Pharmacy    Consult Date(s): 9/26/22  Consulting Provider(s): Svetlana Vallecillo MD    Assessment / Plan    L Hallux Infection 2/2 Traumatic Injury  - Vancomycin  Concurrent Antimicrobials: Cefepime (Day #1)  Day of Vanc Therapy / Ordered Duration: 1 / 7  Current Dosing Method: Bayesian-Guided AUC Dosing  Therapeutic Goal: AUC < 500 mg/L*hr  Current Dose / Frequency: New Start  Plan / Rationale:   Pt with Scr of 1.1 mg/dL today, no baseline to compare to. LD of 2000 mg (~23 mg/kg) given in the ED today. Will start 750 mg IV q12hr as this predicts an ssAUC of 453 mg/L*hr and ssTrough of 15.1 mg/L. Will check a level in ~48 hours. Will continue to monitor clinical condition and make adjustments to regimen as appropriate. Thank you for consulting Pharmacy! Elida Knowles, PharmD  Main Pharmacy: [id number]  9/26/2022 9:02 PM        Subjective/Objective:   Mr. Wanda Fu is a 74 y.o. male with a PMHx significant for HTN, T2DM, Psychotic Disorder who is admitted with L Hallux Infection. Pharmacy has been consulted to dose vancomycin. Ht Readings from Last 1 Encounters:   09/26/22 6' (1.829 m)     Wt Readings from Last 1 Encounters:   09/26/22 187 lb 6.4 oz (85 kg)     Current & Prior Antimicrobial Regimen(s):  Cefepime (9/26 - Current)  Vancomycin (9/26 - Current)  750 mg IV q12hr (9/27 - Current)    Level(s) / Doses:    Date Time Dose Level / Type of Level Interpretation                 Note: Serum levels collected for AUC-based dosing may be high if collected in close proximity to the dose administered. This is not necessarily indicative of toxicity. Cultures & Sensitivities:    Date Site Micro Susceptibility / Result   9/26 Foot Culture 3+ GPC  1+ WBC    9/26 Blood x 2 Sent      Recent Labs     09/26/22  1342   CREATININE 1.1   BUN 19   WBC 13.5*       Estimated Creatinine Clearance: 73 mL/min (based on SCr of 1.1 mg/dL).     Additional Lab Values / Findings of Note:    No results for input(s): PROCAL in the last 72 hours.

## 2022-09-27 NOTE — PROGRESS NOTES
Clinical Pharmacy Progress Note    Vancomycin - Management by Pharmacy    Consult Date(s): 9/26/22  Consulting Provider(s): Dr. Oreilly List / Plan  1)  Left hallux infection s/p traumatic open fracture - Vancomycin  Concurrent Antimicrobials: Cefepime  Day of Vanc Therapy / Ordered Duration: 2 of 7  Current Dosing Method: Bayesian-Guided AUC Dosing  Therapeutic Goal: -600 mg/L*hr  Current Dose / Plan:   Continue 750mg IV q12h. Regimen predicts an AUC = 454 with trough = 15.2. Trough is ordered prior to dose due 9/28 0400. Will continue to monitor clinical condition and make adjustments to regimen as appropriate. Please call with questions--  Thanks--  Clemencia Napier, PharmD, BCPS, BCGP  [id number] ([facility])   9/27/2022 11:28 AM      Interval update:  Pt with rapid response overnight due to increasing SOB / decreased O2 sats. Currently on 2L NC. Planning for left foot I&D later today. Subjective/Objective:   Rosalva Aceves is a 74 y.o. male with a PMHx significant for HTN and DM who is admitted with worsening redness and drainage from left hallux wound. Pt was recently seen in ER 9/17 for an open intra-articular fracture of left hallux. Pharmacy is consulted to dose Vancomycin. Ht Readings from Last 1 Encounters:   09/26/22 6' (1.829 m)     Wt Readings from Last 1 Encounters:   09/26/22 187 lb 6.4 oz (85 kg)     Current & Prior Antimicrobial Regimen(s):  Cefepime 2000mg EI q12h (9/26-current)  Vancomycin - Pharmacy to dose   2000mg IV x1 9/26 14:30   750mg IV q12h (9/27-current)    Vancomycin Level(s) / Doses:    Date Time Dose Level / Type of Level Interpretation   9/28 03:00 750mg q12h Random = ordered           Note: Serum levels collected for AUC-based dosing may be high if collected in close proximity to the dose administered. This is not necessarily indicative of toxicity.     Cultures & Sensitivities:    Date Site Micro Susceptibility / Result   9/26 Blood x2 sent    9/26 Wound sent      Recent Labs     09/26/22  1342   CREATININE 1.1   BUN 19   WBC 13.5*       Estimated Creatinine Clearance: 73 mL/min (based on SCr of 1.1 mg/dL). Additional Lab Values / Findings of Note:    No results for input(s): PROCAL in the last 72 hours.

## 2022-09-27 NOTE — ANESTHESIA PRE PROCEDURE
Department of Anesthesiology  Preprocedure Note       Name:  Rosa Kenny   Age:  74 y.o.  :  1955                                          MRN:  [id number]         Date:  2022      Surgeon: Nayla Dia):  Roseann Pandey DPM    Procedure: Procedure(s):  LEFT FOOT DEBRIDEMENT INCISION AND DRAINAGE    Medications prior to admission:   Prior to Admission medications    Medication Sig Start Date End Date Taking?  Authorizing Provider   acetaminophen (TYLENOL) 325 MG tablet Take 650 mg by mouth every 8 hours as needed for Pain   Yes Historical Provider, MD   ferrous sulfate (IRON 325) 325 (65 Fe) MG tablet Take 325 mg by mouth daily (with breakfast)   Yes Historical Provider, MD   folic acid (FOLVITE) 1 MG tablet Take 1 mg by mouth daily   Yes Historical Provider, MD   insulin glargine (LANTUS) 100 UNIT/ML injection vial Inject 13 Units into the skin nightly   Yes Historical Provider, MD   lisinopril (PRINIVIL;ZESTRIL) 20 MG tablet Take 20 mg by mouth daily   Yes Historical Provider, MD   melatonin 3 MG TABS tablet Take 6 mg by mouth at bedtime   Yes Historical Provider, MD   metFORMIN (GLUCOPHAGE) 500 MG tablet Take 500 mg by mouth 2 times daily (with meals)   Yes Historical Provider, MD   OLANZapine (ZYPREXA) 5 MG tablet Take 5 mg by mouth in the morning and at bedtime   Yes Historical Provider, MD   QUEtiapine (SEROQUEL) 25 MG tablet Take 25 mg by mouth at bedtime   Yes Historical Provider, MD   Ergocalciferol (VITAMIN D2) 50 MCG ( UT) TABS Take 3 tablets by mouth daily   Yes Historical Provider, MD       Current medications:    Current Facility-Administered Medications   Medication Dose Route Frequency Provider Last Rate Last Admin    ondansetron (ZOFRAN-ODT) disintegrating tablet 4 mg  4 mg Oral Q8H PRN Everardo Lozano MD        Or    ondansetron [facility]) injection 4 mg  4 mg IntraVENous Q6H PRN Everardo Lozano MD        polyethylene glycol (GLYCOLAX) packet 17 g  17 g Oral Daily PRN César Aguilar Neo Roque MD        acetaminophen (TYLENOL) tablet 650 mg  650 mg Oral Q6H PRN Marsha Magdaleno MD        Or    acetaminophen (TYLENOL) suppository 650 mg  650 mg Rectal Q6H PRN Marsha Magdaleno MD        0.9 % sodium chloride infusion   IntraVENous Continuous Jessie Díaz  mL/hr at 09/27/22 1318 New Bag at 09/27/22 1318    OLANZapine (ZYPREXA) tablet 5 mg  5 mg Oral BID Osmar Brine, DO        QUEtiapine (SEROQUEL) tablet 25 mg  25 mg Oral Nightly Osmar Brine, DO        cefepime (MAXIPIME) 2000 mg IVPB minibag  2,000 mg IntraVENous Q12H Marsha Magdaleno MD   Stopped at 09/27/22 0717    glucose chewable tablet 16 g  4 tablet Oral PRN Rakesh Hendrix MD        dextrose bolus 10% 125 mL  125 mL IntraVENous PRN Rakesh Hendrix MD        Or    dextrose bolus 10% 250 mL  250 mL IntraVENous PRN Rakesh Hendrix MD        glucagon (rDNA) injection 1 mg  1 mg SubCUTAneous PRN Rakesh Hendrix MD        dextrose 10 % infusion   IntraVENous Continuous PRN Rakesh Hendrix MD        insulin lispro (1 Unit Dial) 0-4 Units  0-4 Units SubCUTAneous TID WC Rakesh Hendrix MD        insulin lispro (1 Unit Dial) 0-4 Units  0-4 Units SubCUTAneous Nightly Rakesh Hendrix MD        vancomycin (VANCOCIN) 750 mg in dextrose 5 % 250 mL IVPB  750 mg IntraVENous Q12H Marsha Magdaleno MD   Stopped at 09/27/22 0448       Allergies:  No Known Allergies    Problem List:    Patient Active Problem List   Diagnosis Code    Wound infection, posttraumatic T14. 8XXA, L08.9       Past Medical History:        Diagnosis Date    Altered mental status     Diabetes ([facility] [address]     Hypertension     Iron deficiency anemia     Psychotic disorder with hallucinations ([facility] [address]        Past Surgical History:  History reviewed. No pertinent surgical history.     Social History:    Social History     Tobacco Use    Smoking status: Never    Smokeless tobacco: Never   Substance Use Topics    Alcohol use: Not Currently Counseling given: Not Answered      Vital Signs (Current):   Vitals:    09/27/22 0346 09/27/22 0611 09/27/22 0842 09/27/22 1147   BP: 136/83  (!) 110/58 (!) 115/58   Pulse: 98  90 82   Resp: 19 18 16   Temp: (!) 102.3 °F (39.1 °C) 99.7 °F (37.6 °C) 98.7 °F (37.1 °C) 97.7 °F (36.5 °C)   TempSrc: Oral Oral Oral Oral   SpO2: 96% 96% 98% 95%   Weight:       Height:                                                  BP Readings from Last 3 Encounters:   09/27/22 (!) 115/58   09/17/22 (!) 159/77       NPO Status:                                                                                 BMI:   Wt Readings from Last 3 Encounters:   09/26/22 187 lb 6.4 oz (85 kg)     Body mass index is 25.42 kg/m².     CBC:   Lab Results   Component Value Date/Time    WBC 13.5 09/26/2022 01:42 PM    RBC 3.37 09/26/2022 01:42 PM    HGB 9.6 09/26/2022 01:42 PM    HCT 29.8 09/26/2022 01:42 PM    MCV 88.5 09/26/2022 01:42 PM    RDW 14.9 09/26/2022 01:42 PM     09/26/2022 01:42 PM       CMP:   Lab Results   Component Value Date/Time     09/26/2022 01:42 PM    K 4.1 09/26/2022 01:42 PM     09/26/2022 01:42 PM    CO2 26 09/26/2022 01:42 PM    BUN 19 09/26/2022 01:42 PM    CREATININE 1.1 09/26/2022 01:42 PM    GFRAA >60 09/26/2022 01:42 PM    AGRATIO 0.9 09/26/2022 01:42 PM    LABGLOM >60 09/26/2022 01:42 PM    GLUCOSE 128 09/26/2022 01:42 PM    PROT 7.5 09/26/2022 01:42 PM    CALCIUM 9.2 09/26/2022 01:42 PM    BILITOT <0.2 09/26/2022 01:42 PM    ALKPHOS 99 09/26/2022 01:42 PM    AST 17 09/26/2022 01:42 PM    ALT 17 09/26/2022 01:42 PM       POC Tests:   Recent Labs     09/27/22  1121   POCGLU 212*       Coags:   Lab Results   Component Value Date/Time    PROTIME 15.0 09/26/2022 01:42 PM    INR 1.18 09/26/2022 01:42 PM       HCG (If Applicable): No results found for: PREGTESTUR, PREGSERUM, HCG, HCGQUANT     ABGs: No results found for: PHART, PO2ART, ELZ1DRC, RHU1YNL, BEART, R4RXSCLN     Type & Screen (If Applicable):  No results found for: LABABO, LABRH    Drug/Infectious Status (If Applicable):  No results found for: HIV, HEPCAB    COVID-19 Screening (If Applicable): No results found for: COVID19        Anesthesia Evaluation  Patient summary reviewed and Nursing notes reviewed no history of anesthetic complications:   Airway: Mallampati: II  TM distance: >3 FB   Neck ROM: full  Mouth opening: > = 3 FB   Dental: normal exam         Pulmonary:normal exam                               Cardiovascular:  Exercise tolerance: no interval change,   (+) hypertension:,     (-)  angina, orthopnea and PND      Rhythm: regular  Rate: normal                    Neuro/Psych:   (+) psychiatric history:            GI/Hepatic/Renal:             Endo/Other:    (+) DiabetesType II DM, , .                 Abdominal:         (-) obese Abdomen: soft. Vascular: Other Findings:           Anesthesia Plan      general     ASA 3       Induction: intravenous. MIPS: Postoperative opioids intended and Prophylactic antiemetics administered. Anesthetic plan and risks discussed with patient and healthcare power of . Plan discussed with CRNA and attending.                     Maia Gautam DO   9/27/2022

## 2022-09-27 NOTE — PROGRESS NOTES
Physical Therapy  HOLD    Came to see pt for PT evaluation. Pt presently off floor for I and D. Will return 9/28/22.   [address] [facility] 0439

## 2022-09-27 NOTE — PROGRESS NOTES
Perfect Serve secure message sent to Dr. Jeff Christopher MD, as follows:  Patient's BP is 110/58, P 90. He is due Metoprolol 25 mg PO. Again, he is NPO/getting ready to have surgery. Should we hold the Metoprolol? Please advise. Thank you!

## 2022-09-28 LAB
ANION GAP SERPL CALCULATED.3IONS-SCNC: 9 MMOL/L (ref 3–16)
BASOPHILS ABSOLUTE: 0 K/UL (ref 0–0.2)
BASOPHILS RELATIVE PERCENT: 0.1 %
BUN BLDV-MCNC: 14 MG/DL (ref 7–20)
CALCIUM SERPL-MCNC: 8.1 MG/DL (ref 8.3–10.6)
CHLORIDE BLD-SCNC: 103 MMOL/L (ref 99–110)
CO2: 25 MMOL/L (ref 21–32)
CREAT SERPL-MCNC: 1 MG/DL (ref 0.8–1.3)
EOSINOPHILS ABSOLUTE: 0.3 K/UL (ref 0–0.6)
EOSINOPHILS RELATIVE PERCENT: 2.4 %
GFR AFRICAN AMERICAN: >60
GFR NON-AFRICAN AMERICAN: >60
GLUCOSE BLD-MCNC: 141 MG/DL (ref 70–99)
GLUCOSE BLD-MCNC: 143 MG/DL (ref 70–99)
GLUCOSE BLD-MCNC: 276 MG/DL (ref 70–99)
GLUCOSE BLD-MCNC: 295 MG/DL (ref 70–99)
GLUCOSE BLD-MCNC: 312 MG/DL (ref 70–99)
HCT VFR BLD CALC: 27.5 % (ref 40.5–52.5)
HEMOGLOBIN: 8.9 G/DL (ref 13.5–17.5)
IRON SATURATION: 7 % (ref 20–50)
IRON: 12 UG/DL (ref 59–158)
LYMPHOCYTES ABSOLUTE: 0.7 K/UL (ref 1–5.1)
LYMPHOCYTES RELATIVE PERCENT: 6.4 %
MCH RBC QN AUTO: 28.6 PG (ref 26–34)
MCHC RBC AUTO-ENTMCNC: 32.3 G/DL (ref 31–36)
MCV RBC AUTO: 88.6 FL (ref 80–100)
MONOCYTES ABSOLUTE: 0.6 K/UL (ref 0–1.3)
MONOCYTES RELATIVE PERCENT: 5.1 %
NEUTROPHILS ABSOLUTE: 9.9 K/UL (ref 1.7–7.7)
NEUTROPHILS RELATIVE PERCENT: 86 %
PDW BLD-RTO: 14.6 % (ref 12.4–15.4)
PERFORMED ON: ABNORMAL
PLATELET # BLD: 356 K/UL (ref 135–450)
PMV BLD AUTO: 8.4 FL (ref 5–10.5)
POTASSIUM SERPL-SCNC: 3.9 MMOL/L (ref 3.5–5.1)
RBC # BLD: 3.1 M/UL (ref 4.2–5.9)
SODIUM BLD-SCNC: 137 MMOL/L (ref 136–145)
TOTAL IRON BINDING CAPACITY: 168 UG/DL (ref 260–445)
TRANSFERRIN: 156 MG/DL (ref 200–360)
VANCOMYCIN RANDOM: 14.9 UG/ML
WBC # BLD: 11.5 K/UL (ref 4–11)

## 2022-09-28 PROCEDURE — 6370000000 HC RX 637 (ALT 250 FOR IP)

## 2022-09-28 PROCEDURE — 6360000002 HC RX W HCPCS

## 2022-09-28 PROCEDURE — 97530 THERAPEUTIC ACTIVITIES: CPT

## 2022-09-28 PROCEDURE — 97162 PT EVAL MOD COMPLEX 30 MIN: CPT

## 2022-09-28 PROCEDURE — 2580000003 HC RX 258

## 2022-09-28 PROCEDURE — 6370000000 HC RX 637 (ALT 250 FOR IP): Performed by: INTERNAL MEDICINE

## 2022-09-28 PROCEDURE — 1200000000 HC SEMI PRIVATE

## 2022-09-28 PROCEDURE — 85025 COMPLETE CBC W/AUTO DIFF WBC: CPT

## 2022-09-28 PROCEDURE — 80048 BASIC METABOLIC PNL TOTAL CA: CPT

## 2022-09-28 PROCEDURE — 80202 ASSAY OF VANCOMYCIN: CPT

## 2022-09-28 PROCEDURE — 36415 COLL VENOUS BLD VENIPUNCTURE: CPT

## 2022-09-28 PROCEDURE — 97535 SELF CARE MNGMENT TRAINING: CPT

## 2022-09-28 PROCEDURE — 99232 SBSQ HOSP IP/OBS MODERATE 35: CPT | Performed by: INTERNAL MEDICINE

## 2022-09-28 RX ORDER — IBUPROFEN 400 MG/1
400 TABLET ORAL ONCE
Status: COMPLETED | OUTPATIENT
Start: 2022-09-28 | End: 2022-09-28

## 2022-09-28 RX ORDER — INSULIN LISPRO 100 [IU]/ML
5 INJECTION, SOLUTION INTRAVENOUS; SUBCUTANEOUS
Status: DISCONTINUED | OUTPATIENT
Start: 2022-09-28 | End: 2022-10-05 | Stop reason: HOSPADM

## 2022-09-28 RX ORDER — INSULIN LISPRO 100 [IU]/ML
0-8 INJECTION, SOLUTION INTRAVENOUS; SUBCUTANEOUS
Status: DISCONTINUED | OUTPATIENT
Start: 2022-09-28 | End: 2022-10-05 | Stop reason: HOSPADM

## 2022-09-28 RX ORDER — INSULIN LISPRO 100 [IU]/ML
0-4 INJECTION, SOLUTION INTRAVENOUS; SUBCUTANEOUS NIGHTLY
Status: DISCONTINUED | OUTPATIENT
Start: 2022-09-28 | End: 2022-10-05 | Stop reason: HOSPADM

## 2022-09-28 RX ORDER — IBUPROFEN 400 MG/1
400 TABLET ORAL EVERY 6 HOURS PRN
Status: DISCONTINUED | OUTPATIENT
Start: 2022-09-28 | End: 2022-10-05 | Stop reason: HOSPADM

## 2022-09-28 RX ADMIN — IBUPROFEN 400 MG: 400 TABLET, FILM COATED ORAL at 20:59

## 2022-09-28 RX ADMIN — OLANZAPINE 5 MG: 5 TABLET, FILM COATED ORAL at 20:43

## 2022-09-28 RX ADMIN — CEFEPIME HYDROCHLORIDE 2000 MG: 2 INJECTION, POWDER, FOR SOLUTION INTRAMUSCULAR; INTRAVENOUS at 18:40

## 2022-09-28 RX ADMIN — QUETIAPINE FUMARATE 25 MG: 25 TABLET ORAL at 20:43

## 2022-09-28 RX ADMIN — ACETAMINOPHEN 650 MG: 325 TABLET, FILM COATED ORAL at 20:43

## 2022-09-28 RX ADMIN — HEPARIN SODIUM 5000 UNITS: 5000 INJECTION INTRAVENOUS; SUBCUTANEOUS at 05:59

## 2022-09-28 RX ADMIN — CEFEPIME HYDROCHLORIDE 2000 MG: 2 INJECTION, POWDER, FOR SOLUTION INTRAMUSCULAR; INTRAVENOUS at 01:54

## 2022-09-28 RX ADMIN — OXYCODONE 5 MG: 5 TABLET ORAL at 20:43

## 2022-09-28 RX ADMIN — INSULIN GLARGINE 15 UNITS: 100 INJECTION, SOLUTION SUBCUTANEOUS at 22:53

## 2022-09-28 RX ADMIN — HEPARIN SODIUM 5000 UNITS: 5000 INJECTION INTRAVENOUS; SUBCUTANEOUS at 20:43

## 2022-09-28 RX ADMIN — IBUPROFEN 400 MG: 400 TABLET, FILM COATED ORAL at 01:05

## 2022-09-28 RX ADMIN — VANCOMYCIN HYDROCHLORIDE 750 MG: 10 INJECTION, POWDER, LYOPHILIZED, FOR SOLUTION INTRAVENOUS at 04:27

## 2022-09-28 RX ADMIN — INSULIN LISPRO 5 UNITS: 100 INJECTION, SOLUTION INTRAVENOUS; SUBCUTANEOUS at 12:41

## 2022-09-28 RX ADMIN — VANCOMYCIN HYDROCHLORIDE 1000 MG: 10 INJECTION, POWDER, LYOPHILIZED, FOR SOLUTION INTRAVENOUS at 12:44

## 2022-09-28 RX ADMIN — OLANZAPINE 5 MG: 5 TABLET, FILM COATED ORAL at 09:52

## 2022-09-28 RX ADMIN — HEPARIN SODIUM 5000 UNITS: 5000 INJECTION INTRAVENOUS; SUBCUTANEOUS at 15:00

## 2022-09-28 RX ADMIN — INSULIN LISPRO 4 UNITS: 100 INJECTION, SOLUTION INTRAVENOUS; SUBCUTANEOUS at 09:54

## 2022-09-28 RX ADMIN — SODIUM CHLORIDE: 9 INJECTION, SOLUTION INTRAVENOUS at 18:45

## 2022-09-28 NOTE — PROGRESS NOTES
Physician Progress Note      PATIENT:               Charley Crook  CSN #:                  [id number]  :                       1955  ADMIT DATE:       2022 1:17 PM  [address] [place] DATE:  RESPONDING  PROVIDER #:        Abbie De Oliveira          QUERY TEXT:    Patient admitted with Left foot gangrene. Per Op note dated :    documentation of debridement. The wound tracked into the first metatarsal   space. All nonviable soft tissue structures were debrided. To accurately   reflect the procedure performed please document if debridement was excisional   or nonexcisional and the deepest depth of tissue removed as down to and   including: The medical record reflects the following:    Risk Factors: 74 y.o. male with DM2, HTN, left hallux infection 2/2 traumatic   injury  Clinical Indicators: Pain in left foot on presentation, Left foot gangrere   (per Op note)  Treatment:I &D and debridement of left hallux sulcus on 22  Options provided:  -- Nonexcisional debridement of subcutaneous tissue  -- Excisional debridement of subcutaneous tissue  -- Nonexcisional debridement of fascia  -- Excisional debridement of fascia  -- Nonexcisional debridement of muscle  -- Excisional debridement of muscle  -- Nonexcisional debridement of bone  -- Excisional debridement of bone  -- Other - I will add my own diagnosis  -- Disagree - Not applicable / Not valid  -- Disagree - Clinically unable to determine / Unknown  -- Refer to Clinical Documentation Reviewer    PROVIDER RESPONSE TEXT:    Excisional debridement of subcutaneous tissue of left Hallux sulcus was   performed during procedure on 22.     Query created by: Sudeep Lim on 2022 4:49 PM      Electronically signed by:  Abbie De Oliveira 2022 5:15 PM

## 2022-09-28 NOTE — PLAN OF CARE
Problem: Infection - Adult  Goal: Absence of infection during hospitalization  Outcome: Progressing     Problem: Safety - Adult  Goal: Free from fall injury  9/28/2022 0819 by Lyudmila Salcedo RN  Outcome: Progressing  9/27/2022 2147 by Jermaine Galloway RN  Outcome: Progressing     Problem: Skin/Tissue Integrity  Goal: Absence of new skin breakdown  Description: 1. Monitor for areas of redness and/or skin breakdown  2. Assess vascular access sites hourly  3. Every 4-6 hours minimum:  Change oxygen saturation probe site  4. Every 4-6 hours:  If on nasal continuous positive airway pressure, respiratory therapy assess nares and determine need for appliance change or resting period.   Outcome: Progressing     Problem: ABCDS Injury Assessment  Goal: Absence of physical injury  9/27/2022 2147 by Jermaine Galloway RN  Outcome: Progressing

## 2022-09-28 NOTE — PROGRESS NOTES
Pts daughter has called multiple times asking for a time for the patients debridement on Friday. Her contact info has been added below for podiatry to call. Podiatry can also notify RN of a time and we will be happy to call her.    Aniket Yusuf [phone]

## 2022-09-28 NOTE — PROGRESS NOTES
09/28/22 4399   Encounter Summary   Encounter Overview/Reason  Initial Encounter   Service Provided For: Patient   Referral/Consult From: Nurse  Mariana Goldman RN)   Last Encounter  09/28/22   Complexity of Encounter Moderate   Begin Time 0915   End Time  0928   Total Time Calculated 13 min   Encounter    Type Initial Screen/Assessment   Assessment/Intervention/Outcome   Assessment Calm; Hopeful   Intervention Active listening   Outcome Comfort;Refused/Declined   Staff Delmi Becerra MA

## 2022-09-28 NOTE — PROGRESS NOTES
Physical Therapy  Facility/Department: [address]  Physical Therapy Initial Assessment and treatment    Name: Mo Neff  : 1955  MRN: [id number]  Date of Service: 2022    Discharge Recommendations:    Mo Neff scored a 10/24 on the AM-PAC short mobility form. Current research shows that an AM-PAC score of 17 or less is typically not associated with a discharge to the patient's home setting. Based on the patient's AM-PAC score and their current functional mobility deficits, it is recommended that the patient have 3-5 sessions per week of Physical Therapy at d/c to increase the patient's independence. Please see assessment section for further patient specific details. PT Equipment Recommendations  Equipment Needed: No (defer to next level of care)      Patient Diagnosis(es): The primary encounter diagnosis was Wound infection, posttraumatic. A diagnosis of Gangrene of left foot ([address] was also pertinent to this visit. Past Medical History:  has a past medical history of Altered mental status, Diabetes ([address] Hypertension, Iron deficiency anemia, and Psychotic disorder with hallucinations ([address] Past Surgical History:  has a past surgical history that includes Foot Debridement (Left, 2022). Assessment   Body Structures, Functions, Activity Limitations Requiring Skilled Therapeutic Intervention: Decreased functional mobility ; Decreased safe awareness;Decreased cognition;Decreased endurance;Decreased balance  Assessment: Patient tolerated session fair with mobility being limited due to overall decreased activity tolerance. Patient is NWB on LLE which he actively tries to maintain throughout transfer but has increased difficulty showing occasional weight bearing through heel of left foot. He requires mod assist x 2 for transfers to stand and to bedside chair.   He is unable to take stes maintaining NWB but is able to slide right foot on the ground with assist x 2 and heavy use of walker, verbal cues for sequencing provided. Patient is oriented x 1, able to follow one step commands with increased time but unable to provide full prior level of function or timeline of events leading up to this hospital admission. Patient admitted from SNF; would benefit from continued skilled PT upon discharge. Will follow while in acute care setting to maximize independence with mobility. Treatment Diagnosis: impaired mobility associated with wound infection  Therapy Prognosis: Fair  Decision Making: Medium Complexity  Requires PT Follow-Up: Yes  Activity Tolerance  Activity Tolerance: Patient limited by endurance; Patient limited by fatigue     Plan   Plan  Plan:  (2-5)  Current Treatment Recommendations: Strengthening, Balance training, Gait training, Functional mobility training, Transfer training, Endurance training, Safety education & training, Patient/Caregiver education & training, Therapeutic activities  Safety Devices  Type of Devices: Call light within reach, Bed alarm in place, Nurse notified, Left in bed, Gait belt     Restrictions  Position Activity Restriction  Other position/activity restrictions: NWB L foot     Subjective   Pain: no complaints of pain throughout session  General  Chart Reviewed: Yes  Patient assessed for rehabilitation services?: Yes  Additional Pertinent Hx: 74 y.o. M admitted  from nursing facility 9/26 with sepsis due to Left hallux infection 2/2 recent traumatic open fracture. s/p bedside I&D, plan for OR 9/27 for I&D. NWB per podiatry. PMHx includes DM, psychotic disorder with hallucinations. Response To Previous Treatment: Not applicable  Family / Caregiver Present: No  Referring Practitioner: Holden Torres DPM  Referral Date : 09/26/22  Diagnosis: wound infection  Follows Commands: Within Functional Limits  General Comment  Comments: Patient supine in bed upon arrival.  Subjective  Subjective: Patient agreeable to PT evaluation.          Social/Functional History  Social/Functional History  Type of Home: Facility ([facility])  Additional Comments: Admit date at [facility] listed as 8/4, CM able to determine pt was skilled. PLOF unknown. Pt reports typically living in a house with a friend but unable to provide timeline and full prior level of functioning  Vision/Hearing  Vision  Vision: Within Functional Limits  Hearing  Hearing: Within functional limits    Cognition   Orientation  Overall Orientation Status: Impaired  Orientation Level: Oriented to person;Disoriented to place; Disoriented to time;Disoriented to situation  Cognition  Overall Cognitive Status: Exceptions  Following Commands:  Follows one step commands with increased time  Safety Judgement: Decreased awareness of need for safety;Decreased awareness of need for assistance  Problem Solving: Decreased awareness of errors  Insights: Decreased awareness of deficits  Initiation: Requires cues for some  Sequencing: Requires cues for some     Objective   Heart Rate: 84  Heart Rate Source: Monitor  BP: (!) 147/67  BP Location: Right upper arm  BP Method: Automatic  Patient Position: Up in chair  MAP (Calculated): 93.67  Resp: 20  SpO2: 95 %  O2 Device: Nasal cannula              AROM RLE (degrees)  RLE AROM: WFL  AROM LLE (degrees)  LLE AROM : Exceptions  LLE General AROM: left foot/ankle not assessed; otherwise [facility]  Strength RLE  Strength RLE: WFL  Strength LLE  Strength LLE: Exception  Comment: left foot/ankle not assessed; otherwise WFL           Bed mobility  Supine to Sit: Minimal assistance (head of bed elevated, cues for sequencing, assist to bring trunk to upright sitting)  Scooting: Contact guard assistance (to EOB)  Transfers  Sit to Stand: Dependent/Total;2 Person Assistance (mod assist x 2 from edge of raised bed, verbal cues for UE placement)  Stand to sit: Dependent/Total;2 Person Assistance (mod assist x 2 to bedside chair, verbal and tactile cues for safety and reach back)  Bed to Chair: Dependent/Total;2 Person Assistance (mod assist x 2 to scoot right foot with use of FWW)  Stand Pivot Transfers: Dependent/Total;2 Person Assistance (mod assist x 2 with FWW)        Balance  Sitting - Static: Fair;+ (supervision)  Standing - Static: Poor (mod assist with UE support)  Standing - Dynamic: Poor (mod assist x 2 to scoot RLE with FWW)           OutComes Score                                                  AM-PAC Score  AM-PAC Inpatient Mobility Raw Score : 10 (09/28/22 1001)  AM-PAC Inpatient T-Scale Score : 32.29 (09/28/22 1001)  Mobility Inpatient CMS 0-100% Score: 76.75 (09/28/22 1001)  Mobility Inpatient CMS G-Code Modifier : CL (09/28/22 1001)          Tinneti Score       Goals  Short Term Goals  Time Frame for Short term goals: discharge  Short term goal 1: patient will perform bed mobility with SBA  Short term goal 2: patient will perform transfers sit<>stand with min assist x 1  Short term goal 3: patient will perform transfers bed<>chair with min assist x 1  Short term goal 4: patient will ambulate 5' with FWW and min assist x 1 maintaining NWB on LLE  Patient Goals   Patient goals : none stated       Education  Patient Education  Education Given To: Patient  Education Provided: Role of Therapy;Plan of Care; Fall Prevention Strategies;Precautions;Transfer Training  Education Method: Verbal  Barriers to Learning: Cognition  Education Outcome: Continued education needed      Therapy Time   Individual Concurrent Group Co-treatment   Time In 0808         Time Out 0848         Minutes 40         Timed Code Treatment Minutes: 25 Minutes   Timed Code Treatment Minutes:  25 Minutes    Total Treatment Minutes:    25 minutes treatment + 15 minutes evaluation = 40 total treatment minutes  If patient discharges prior to next session this note will serve as a discharge summary. Please see below for the latest assessment towards goals.     [address]

## 2022-09-28 NOTE — PROGRESS NOTES
Podiatric Surgery Daily Progress Note  Isa Osullivan      Subjective :   Patient seen and examined this am at the bedside. Patient denies any acute overnight events. Patient denies N/V/F/C/SOB. Patient denies calf pain, thigh pain, chest pain. Review of Systems: A 12 point review of symptoms is unremarkable with the exception of the chief complaint. Patient specifically denies nausea, fever, vomiting, chills, shortness of breath, chest pain, abdominal pain, constipation or difficulty urinating. Objective     BP (!) 150/75   Pulse 81   Temp 98.1 °F (36.7 °C) (Oral)   Resp 20   Ht 6' (1.829 m)   Wt 187 lb 6.4 oz (85 kg)   SpO2 95%   BMI 25.42 kg/m²      I/O:  Intake/Output Summary (Last 24 hours) at 9/28/2022 0729  Last data filed at 9/27/2022 2134  Gross per 24 hour   Intake 600 ml   Output 205 ml   Net 395 ml              Wt Readings from Last 3 Encounters:   09/26/22 187 lb 6.4 oz (85 kg)       LABS:    Recent Labs     09/26/22  1342 09/27/22  1327   WBC 13.5* 12.5*   HGB 9.6* 9.5*   HCT 29.8* 28.8*    355        Recent Labs     09/27/22  1327   *   K 3.9      CO2 26   BUN 14   CREATININE 0.9        Recent Labs     09/26/22  1342   PROT 7.5   INR 1.18*           LOWER EXTREMITY EXAMINATION  Dressing to left LE intact. No strikethrough noted to the external dressing. No drainage noted to the internal layers of the dressing. VASCULAR: DP and PT pulses are faintly palpable +1/4 b/l. Upon hand-held Doppler examination, DP and PT pulses were noted to be biphasic bilateral. CFT is brisk to the digits of the foot b/l. Skin temperature is warm to cool from proximal to distal to b/l lower extremity. No edema noted. Erythema noted to the left forefoot that tracks proximally to dorsal ankle, erythema noted to be improving. No pain with calf compression b/l. NEUROLOGIC: Gross and epicritic sensation is diminished b/l.  Protective sensation is absent at all pedal sites b/l.    DERMATOLOGIC:   Patient provided verbal consent for photos to be obtained today:     Left lower extremity:                Full-thickness surgical incision noted at the dorsal lateral aspect of the first metatarsal phalangeal joint extending distally through the first webspace to the plantar medial aspect of the left hallux. No purulent drainage expressed. Sanguinous drainage noted. Dusky dermal changes noted to the plantar medial, plantar lateral, and plantar aspect of the left hallux. Within the surgical incision exposed bone is noted. Wound base is granular in nature. No fluctuance, crepitus, or malodor noted. Right lower extremity is a closed soft tissue envelope without any acute signs of infection. MUSCULOSKELETAL: Muscle strength is 5/5 for all pedal groups tested. No pain with palpation of the foot or ankle b/l. Ankle joint ROM is decreased in dorsiflexion with the knee extended. No obvious biomechanical abnormalities. IMAGING:  Xray foot; left:  Narrative   XR FOOT LEFT (MIN 3 VIEWS)       Indication: infection, eval for air        COMPARISON: 9/17/2022       Findings: 3 views of the left foot were obtained. Again identified is a fracture of the proximal phalanx of the great toe. There is increased interval displacement of the fracture fragment with rotation laterally. No additional fracture deformity. No    soft tissue gas or acute abnormality. Impression   Impression: Increased interval displacement of the previously noted fracture fragment of the great toe. No discrete soft tissue gas. CT foot; left:  Narrative   CT FOOT LEFT WO CONTRAST       Indication: Left hallux infection       Technique: Helical CT images of the left foot were acquired without the administration of intravenous contrast media. Axial, coronal and sagittal reformatted images were constructed from the raw data set.  Individualized dose optimization technique was    used in order to meet ALARA standards for radiation dose reduction. In addition to vendor specific dose reduction algorithms, the dose reduction techniques vary based on the specific scanner utilized but frequently include automated exposure control,    adjustment of the mA and/or kV according to patient size, and use of iterative reconstruction technique. COMPARISON: 9/17/2022. Findings: Again identified is fracture at the lateral aspect of the proximal phalanx of the left great toe. Small locules of gas are visualized adjacent to the fracture fragment. Adjacent soft tissue swelling of the left great toe is noted. Bandage    material overlies the plantar aspect of the great toe. No additional fracture deformity. No organized or drainable fluid collection. Impression   Impression: Small locules of soft tissue gas adjacent to the major fracture fragment of the left great toe. Correlation for open fracture versus soft tissue infection recommended. IMPRESSION/RECOMMENDATIONS:    -S/P left foot I&D (DOS 9/27/22)  -Diabetic foot infection, hallux 2/2 traumatic open fracture-improving  -History of open intra-articular fracture at the base of the proximal phalanx of the hallux; Left (9/17/22)  -Diabetes mellitus type 2 with peripheral neuropathy     -Patient examined and evaluated at the bedside   -Hypertensive otherwise VSS. No updated CBC this a.m.  -ESR 90 .6  -HbA1c 6.2  -Surgical path and surgical culture pending  -X-ray and CT scan reviewed, impressions noted above  -Wound culture: MRSA and Corynebacterium stratum  -Continue IV antibiotics per ID recommendations  -ID following; recommend continuing vancomycin and cefepime  -Nonweightbearing to left lower extremity  -Patient will return to the OR possibly this Friday on 9/30 once the wound has converted from dirty to clean and surgical pathology results have come back    DISPO: S/P incision and drainage of left foot (DOS 9/27/22). Surgical path and culture pending.   All other labs and imaging reviewed. ID following; recommendations noted above. Patient will return to the OR possibly this Friday once wound has converted from dirty to clean and surgical pathology has resulted.     Patient was examined and evaluated with Dr. El Ames DPM.    Rashid Palomares DPM   Podiatric Resident PGY2  Pager [phone] or Abdi  9/28/2022, 8:38 AM

## 2022-09-28 NOTE — PROGRESS NOTES
ID Follow-up NOTE    CC:   L Traumatic foot infection  Antibiotics: Vancomycin, Cefepime    Admit Date: 9/26/2022  Hospital Day: 3    Subjective:     Patient was seen and examined sitting in a chair. He had no complains. He denied any fevers or chills. He denied any tenderness with the bandaged wound. Wound appeared clean with no drainage and tenderness with touch. Objective:     Patient Vitals for the past 8 hrs:   BP Temp Temp src Pulse Resp SpO2   09/28/22 1251 (!) 152/62 99 °F (37.2 °C) Oral 82 26 92 %   09/28/22 0948 (!) 147/67 98 °F (36.7 °C) Oral 84 20 95 %     Temp: 100.9F; 101.6F (12am-1am)      I/O last 3 completed shifts: In: 600 [P.O.:100; I.V.:500]  Out: 205 [Urine:200; Blood:5]  I/O this shift: In: 540 [P.O.:540]  Out: -     EXAM:  GENERAL: No apparent distress. HEENT: Membranes moist, no oral lesion  NECK:  Supple, no lymphadenopathy  LUNGS: Clear b/l, no rales, no dullness  CARDIAC: RRR, no murmur appreciated  ABD:  + BS, soft / NT  EXT:  No rash, no edema, no lesions  NEURO: No focal neurologic findings  PSYCH: More Oriented today with clear speech , sensorium, mood normal  LINES:  Peripheral IV    REVIEW OF SYSTEMS:    No fever / chills / sweats. No weight loss. No visual change, eye pain, eye discharge. No oral lesion, sore throat, dysphagia. Denies cough / sputum. Denies chest pain, palpitations. Denies n / v / abd pain. No diarrhea. Denies dysuria or change in urinary function. Denies joint swelling or pain. No myalgia, arthralgia.   Denies skin changes, itching  Denies focal weakness, sensory change or other neurologic symptom    Denies new / worse depression, psychiatric symptoms    Data Review:  Lab Results   Component Value Date    WBC 11.5 (H) 09/28/2022    HGB 8.9 (L) 09/28/2022    HCT 27.5 (L) 09/28/2022    MCV 88.6 09/28/2022     09/28/2022     Lab Results   Component Value Date    CREATININE 1.0 09/28/2022    BUN 14 09/28/2022     09/28/2022    K 3.9 09/28/2022     09/28/2022    CO2 25 09/28/2022       Hepatic Function Panel:   Lab Results   Component Value Date/Time    ALKPHOS 99 09/26/2022 01:42 PM    ALT 17 09/26/2022 01:42 PM    AST 17 09/26/2022 01:42 PM    PROT 7.5 09/26/2022 01:42 PM    BILITOT <0.2 09/26/2022 01:42 PM    LABALBU 3.6 09/26/2022 01:42 PM       MICRO:  (9/26) Wound culture positive MRSA, C.Striatum  (9/26) Blood cultures shows no growth yet    IMAGING:  (9/27)CT foot Small locules of soft tissue gas adjacent to the major fracture fragment of the left great toe. Correlation for open fracture versus soft tissue infection recommended    Scheduled Meds:   insulin lispro  0-8 Units SubCUTAneous TID WC    insulin lispro  0-4 Units SubCUTAneous Nightly    vancomycin  1,000 mg IntraVENous Q12H    insulin glargine  15 Units SubCUTAneous Nightly    insulin lispro  5 Units SubCUTAneous TID WC    OLANZapine  5 mg Oral BID    QUEtiapine  25 mg Oral Nightly    heparin (porcine)  5,000 Units SubCUTAneous 3 times per day    cefepime  2,000 mg IntraVENous Q12H       Continuous Infusions:   sodium chloride 100 mL/hr at 09/27/22 1823    dextrose         PRN Meds:  ondansetron **OR** ondansetron, polyethylene glycol, acetaminophen **OR** acetaminophen, oxyCODONE **OR** oxyCODONE, glucose, dextrose bolus **OR** dextrose bolus, glucagon (rDNA), dextrose      Assessment:     L Hallux Infection 2/2 Traumatic Injury  Bedside treated in ER on 9/17/22, unknown if complaint with wound dressings and Abx's since then  Patient febrile and hypertensive. ESR 90,WBC 11.5  -CT Foot: \"Small locules of soft tissue gas adjacent to the major fracture fragment of the left great toe. Correlation for open fracture versus soft tissue infection recommended\"  -Bedside I&D performed 9/26/22 in ER.  I&D and bone biopsy on 9/27/22  -started on Vanc and Cefepime  -non-weight bearing for now  -wound culture positive for MRSA, C. striatum  -follow up blood cultures no growth

## 2022-09-28 NOTE — CARE COORDINATION
Patient was  admitted  from  SNF  : CM spoke with  Conradokristi Matthew  [phone]  KARLA at  [address] who confirmed  pt was there skilled  and  could return no pre cert  needed once medically stable. Patient here for Pod intervention. Surgery will be Friday -9/30.  Electronically signed by J Carlos Sparks RN on 9/28/2022 at 5:07 PM

## 2022-09-28 NOTE — PROGRESS NOTES
Clinical Pharmacy Progress Note    Vancomycin - Management by Pharmacy    Consult Date(s): 9/26/22  Consulting Provider(s): Dr. Harman Ramirez / Plan  1)  Left hallux infection s/p traumatic open fracture - Vancomycin  Concurrent Antimicrobials: Cefepime  Day of Vanc Therapy / Ordered Duration: 3 of 7  Current Dosing Method: Bayesian-Guided AUC Dosing  Therapeutic Goal: -600 mg/L*hr  Current Dose / Plan:   Currently on 750mg IV q12h. Random level this AM = 14.9mcg/mL (drawn ~5h after prior dose). Calculated AUC subtherapeutic at 375. Will increase dose to 1000mg IV q12h. Regimen predicts an AUC = 494 with trough = 16.4. Will plan for repeat level in 2-3 days (or sooner if clinically indicated). Will continue to monitor clinical condition and make adjustments to regimen as appropriate. Wound culture is growing MRSA with Vanc SHAYE = 2, may indicate higher risk of treatment failure. May consider changing to Linezolid 600mg po/IV q12h - ID is following; defer abx decisions to them. Please call with questions--  Thanks--  Frederick Tapia, PharmD, BCPS, BCGP  [id number] ([facility])   9/28/2022 11:18 AM      Interval update:  Pt is s/p left foot I&D with bone biopsy (done yesterday). Planning for further debridement and possible hallux amputation on Friday. Subjective/Objective:   Sandy Christina is a 74 y.o. male with a PMHx significant for HTN and DM who is admitted with worsening redness and drainage from left hallux wound. Pt was recently seen in ER 9/17 for an open intra-articular fracture of left hallux. Pharmacy is consulted to dose Vancomycin.     Ht Readings from Last 1 Encounters:   09/26/22 6' (1.829 m)     Wt Readings from Last 1 Encounters:   09/26/22 187 lb 6.4 oz (85 kg)     Current & Prior Antimicrobial Regimen(s):  Cefepime 2000mg EI q12h (9/26-current)  Vancomycin - Pharmacy to dose   2000mg IV x1 9/26 14:30   750mg IV q12h (9/27-9/28)   1000mg IV q12h (9/28-current)    Vancomycin Level(s) / Doses:    Date Time Dose Level / Type of Level Interpretation   9/28 09:43 750mg q12h Random = 14.9 mcg/mL Drawn ~5 hr after 4th total dose  Calculated AUC = 375  Increase to 1000mg q12h          Note: Serum levels collected for AUC-based dosing may be high if collected in close proximity to the dose administered. This is not necessarily indicative of toxicity. Cultures & Sensitivities:    Date Site Micro Susceptibility / Result   9/26 Blood x2 No growth to date    9/26 Wound MRSA  Corynebacterium Vanc SHAYE = 2  No further w/u   9/27 Surgical tissue In process      Recent Labs     09/26/22  1342 09/27/22  1327 09/28/22  0943   CREATININE 1.1 0.9 1.0   BUN 19 14 14   WBC 13.5* 12.5* 11.5*         Estimated Creatinine Clearance: 81 mL/min (based on SCr of 1 mg/dL). Additional Lab Values / Findings of Note:    No results for input(s): PROCAL in the last 72 hours.

## 2022-09-28 NOTE — CARE COORDINATION
Called daughter, [facility] JUAN, of patient and they really want patient to be near them in [place], [place]. Patient having surgery on this Friday. OT=14, PT=10 today. The choices they made for SNFs are:     [facility]  [address] [address] [facility], [address]  Phone:  [phone]  Fax: This CM called and v mailbox full. [address] [place] [address], , [place], [place], [address]  Phone: [phone]      General Fax: [phone]  Voice recording and tried to leave message on SW line and it said it wasn't accepting messages at this time. [facility] at [place] [address], [facility] [address]  Phone: [phone]  General Fax: [phone]  Got recording, tried to leave message for admissions- Toula Fickle- but mailbox was full. CM will continue to follow patient until discharge.   Electronically signed by Arpan Mi RN on 9/28/2022 at 6:06 PM

## 2022-09-28 NOTE — PROGRESS NOTES
Occupational Therapy  Daily Treatment Note  Patient Name: Suzi Ferrera  MRN: [id number]    Assessment: Pt more alert today, but cognition still quite impaired. Note left by daughter in room indicates some of the impairments may be chronic/subacute. Pt able to participate in transfer today - mod assist x2 with good effort at maintaining NWB to L foot. Pt requires assist with all ADLs. Recommend continued inpt OT/PT at d/c. Discharge Recommendations: Suzi Ferrera scored a 14/24 on the AM-PAC ADL Inpatient form. Current research shows that an AM-PAC score of 17 or less is typically not associated with a discharge to the patient's home setting. Based on the patient's AM-PAC score and their current ADL deficits, it is recommended that the patient have 3-5 sessions per week of Occupational Therapy at d/c to increase the patient's independence. Please see assessment section for further patient specific details. Equipment Needs:  No    Chart Reviewed: Yes     Other position/activity restrictions: NWB L foot   Additional Pertinent Hx: 74 y.o. M admitted  from nursing facility 9/26 with sepsis due to Left hallux infection 2/2 recent traumatic open fracture. s/p bedside I&D, plan for OR 9/27 for I&D. NWB per podiatry. PMHx includes DM, psychotic disorder with hallucinations. Treatment Diagnosis: Decreased activity tolerance, impaired ADLs and mobility    Subjective: Pt in bed on arrival. Pleasant and cooperative. \"I just want to eat breakfast.\"    Pain: No c/o pain    Social/Functional History  Type of Home: Facility ([facility] skilled most recently)  Additional Comments: Admit date at [facility] listed as 8/4, CM able to determine pt was skilled. PLOF unknown.  Pt reports typically living in a house with a friend but unable to provide timeline and full prior level of functioning  Addendum 9/28: note left by daughter in patient's room indicating cognition has been impaired due to \"acute psychosis\" and that he will move his hands around a lot and talk like he is somewhere else, have hallucinations. Also, needs help to eat sometimes. Unclear how long this has been the case, but pt does have acute psychosis with hallucinations in PMHx. Objective:    Cognition/Orientation: Impaired - more alert today, oriented to self, month, minimal situation. Follows simple commands. More conversant today. Impaired memory, insight, safety awareness, problem solving, sequencing. Bed mobility   Supine to sit: Min assist, mod cues (HOB raised and rail used)   Scooting: SBA, min cues (increased time, effort)  Sitting: Spvn    Functional Mobility   Sit to Stand: Mod assist x2, mod cues  Stand to Sit: Mod assist x2, mod cues  Bed to Chair Transfer: Mod assist x2, mod cues, stand step with walker (good effort at NWB, occasionally bearing minimal weight through heel but responsive to cues)  Static stance: Mod assist, min cues at walker (R leaning to offweight L foot, 1 min)    ADLs   Grooming: Mod I, set up (to wash face)  UB dressing: pt assisted to change gown  LB dressing: Dependent (to change brief)  Toileting: Dependent (brief/linens soiled with urine on arrival, brief and linens changed, anticipate pt could participate if given the option to use toilet)    Activity Tolerance: Tolerated session well. On 3L O2 on arrival, removed briefly with spO2 bouncing around 88%. Placed back on 2L O2 for remainder of session.      Patient Education: Role of therapy, orientation, NWB, transfers, safety precautions - verb understanding    Safety Devices in Place: left in chair, reclined, alarm on, needs in reach, set up with breakfast, Rec [facility] if able to maintain NWB in device, RN aware    Goals:  Short Term Goals  Time Frame for Short term goals: by D/C  Short Term Goal 1: Maintain sitting balance 20 min with spvn - Not met  Short Term Goal 2: Perform all grooming tasks mod I, set up - Not met  Short Term Goal 3: Tolerate/Attempt stance at walker compliant with NWB L foot to progress mobility - Met 9/28; Static stance 2 min with CGA - Not met  Short Term Goal 4: Transfer to/from chair/BSC with min assist - Not met         Plan:      Times per Week: 2-5x   Times per Day: Daily    If patient is discharged prior to next treatment, this note will serve as the discharge summary.       Therapy Time   Individual Concurrent Group Co-treatment   Time In 0808         Time Out 0848         Minutes 40            Timed Code Treatment Minutes: 40  Total Treatment Time: 36       Carol Astorga, OT

## 2022-09-28 NOTE — PROGRESS NOTES
Pt calm, pleasant, able to voice needs. Tolerating IV atbx, no requests for PRNs. Vitals WDL except hypertensive and low grade fever in the evening. Up in the chair for lunch. Bed/chair alarm used, no SOB, distress or falls witnessed during shift. Blood pressure (!) 159/80, pulse 95, temperature (!) 101.1 °F (38.4 °C), temperature source Oral, resp. rate 18, height 6' (1.829 m), weight 187 lb 6.4 oz (85 kg), SpO2 92 %.     Tiki Palma RN

## 2022-09-28 NOTE — OP NOTE
Operative Note      Patient: Isa Osullivan  YOB: 1955  MRN: [id number]    Date of Procedure: 9/27/2022    Pre-Op Diagnosis: LEFT FOOT GANGRENE    Post-Op Diagnosis: Same       Procedure(s):  LEFT FOOT DEBRIDEMENT INCISION AND DRAINAGE    Surgeon(s):  Opal Christie DPM    Assistant:   Resident: Anu Marina DPM  Student: Tye Mireles MS4    Anesthesia: Choice    Hemostasis: anatomic dissection    Injectables: pre-op 20cc of 1% lidocaine plain and post-op 10cc of 0.5% marcaine plain     Materials:  0.5\" iodoform packing      Estimated Blood Loss (mL): Minimal    Complications: None    Specimens:   ID Type Source Tests Collected by Time Destination   1 : deep tissue culture Tissue Tissue CULTURE, FUNGUS, CULTURE, TISSUE, CULTURE WITH SMEAR, ACID FAST BACILLIUS Opal Christie DPM 9/27/2022 1516    A : fracture fragment proximal phalanx Tissue Tissue SURGICAL PATHOLOGY Opal Christie DPM 9/27/2022 1523        Implants:  * No implants in log *      Drains: * No LDAs found *    Findings: Fluctuance noted to the plantar and dorsal aspect of the 1st metatarsal head. Wound tracked to the left first intermetatarsal space. Approximately 2cc of purulent drainage noted to the first intermetatarsal space. No more purulence was encountered. Soft tissue structures noted to be unhealthy and devitalized at the lateral aspect of the left hallux. Patient will likely need hallux amputation. Fracture fragment was removed. INDICATIONS FOR PROCEDURE: This patient has signs and symptoms clinically consistent with the above mentioned preoperative diagnosis. Having failed conservative treatment, it was determined that the patient would benefit from surgical intervention. All potential risks, benefits, and complications were discussed with the patient prior to the scheduling of surgery. All of the patient's questions were answered and no guarantees were given.  The patient wished to proceed with surgery, and informed written consent was obtained. DETAILS OF PROCEDURE: The patient was brought from the pre-operative area and placed on the operating table in the supine position. Following IV sedation, a local anesthetic block consisting of 20 cc of 1% lidocaine plain was then injected proximal to the incision site. The left lower extremity was then scrubbed, prepped, and draped in the usual sterile fashion. A time-out was performed. The patient, procedure, and operative site were confirmed and the following procedure was performed. PROCEDURE; LEFT FOOT DEBRIDEMENT INCISION AND DRAINAGE:  At this time, attention was directed to the patients left foot where over the lateral and plantar aspect of the hallucal sulcus, there was a full-thickness ulceration secondary to a previous traumatic open intra-articular base of the proximal phalanx fracture. At this time, a full-thickness incision was continued at the dorsal and plantar aspect of the ulceration with a number 15 blade. Blunt dissection was then utilized to explore for any more purulent drainage or pockets of abscess. Upon completion, approximately 2 cc of purulent drainage was noted to the first intermetatarsal space. The wound tracked into the first intermetatarsal space. All nonviable remaining soft tissue structures were debrided until there was only healthy, bleeding tissue noted. At this time, a deep tissue wound culture was obtained and sent to microbiology for culture and sensitivity. Attention was directed back to the ulceration site. The fracture fragment from the previous intra-articular base of the proximal phalanx fracture was identified and excised. The fracture fragment was then sent to pathology for histological and pathological examination. The wound was irrigated copiously with normal sterile saline via pulse lavage.  Upon completion, the wound was gently packed at the dorsal and plantar compartments with Iodoform 1/2 inch Nu Gauze packing material.     At this time, local anesthetic consisting of 10 cc of 0.5% Marcaine plain was injected proximal to the incision site for patient's postoperative comfort. A soft sterile dressing consisting of Adaptic, sterile gauze, Mayra, cast padding, and Ace was applied. Adequate perfusion was noted to all remaining digits. END OF PROCEDURE: The patient tolerated the procedure and anesthesia well. The patient left the OR and was transferred to the PACU with vital signs stable and vascular status intact to all aspects of the left lower extremity. Following short period of postoperative monitoring, the patient will be readmitted to medical floor for further medical management and treatment of their medical comorbid conditions. DISPO: S/P incision and drainage of left foot. Incision was left open and packed with 0.5\" iodoform packing. Hallux amputation will be likely. Patient will be transferred back to floor and will return to the OR for further debridement on Friday. OP note dictated on behalf of Dr. Noam Moran, D.P.M.     Electronically signed by Iglesia Mariscal DPM on 9/28/2022 at 10:28 AM

## 2022-09-28 NOTE — PROGRESS NOTES
Internal Medicine Progress Note    Patient Name: Danielle Patient   Patient : 1955   Date: 2022   Admit Date: 2022     CC: Foot Pain (Pt reports he injured left foot last week and it was sutured. Today nurse from [facility] noticed swelling and redness around the foot. Pain 8/10)       Interval History: Patient seen and examined at bedside. No overnight events. Patient will go back to OR on Friday. Patient denies all complaints, including headache, CP, SOB, bowel or urinary changes. HPI: Danielle Patient is a 74 y.o. M who presents from [address] [place] [address] for worsening of a recent foot injury. Notably, he was seen on 22 in the ER for an open intra-articular fracture thought the base of the first digit on the left. It was treated at bedside with flushing and repair by podiatry at that time, and the patient was given Ancef in the ER and sent home on Keflex. Patient did not follow up with Dr. Cristian Bucriaga outpatient as he was directed. Patient is Alert and Orientated x 2 (to person, place, but not year) and states he has trouble figuring out words. He is unable to tell me about his PMHx. His daughters were present when Podiatry saw the patient, and reportedly the family states they are unsure if the patient's facility did dressing changes or administered his antibiotics. Patient is a poor historian and is unable to corrobarate taking meds or not. Patient does endorse fevers. Family noticed redness and drainage from his foot wound. Patient complains of pain, even when not moving his foot. In the ED: Podiatry performed a bedside I&D and obtained wound cultures. ROS:  As per interval history above.       Vital Signs:  Patient Vitals for the past 8 hrs:   BP Temp Temp src Pulse Resp SpO2   22 0338 (!) 150/75 98.1 °F (36.7 °C) Oral 81 20 95 %   22 0158 -- (!) 100.9 °F (38.3 °C) Oral -- -- --   22 0040 (!) 147/63 (!) 101.6 °F (38.7 °C) Oral 89 24 93 % Physical Exam:  Physical Exam  Eyes:      Extraocular Movements: Extraocular movements intact. Cardiovascular:      Rate and Rhythm: Normal rate and regular rhythm. Pulmonary:      Effort: Pulmonary effort is normal.      Breath sounds: Normal breath sounds. Abdominal:      General: Abdomen is flat. Palpations: Abdomen is soft. Musculoskeletal:         General: Signs of injury present. Cervical back: Normal range of motion. Skin:     General: Skin is warm and dry. Neurological:      Mental Status: He is alert.       Comments: Oriented to person and place, not year        Intake/Output Summary (Last 24 hours) at 9/28/2022 6406  Last data filed at 9/27/2022 2134  Gross per 24 hour   Intake 600 ml   Output 205 ml   Net 395 ml        Medications:   OLANZapine  5 mg Oral BID    QUEtiapine  25 mg Oral Nightly    heparin (porcine)  5,000 Units SubCUTAneous 3 times per day    cefepime  2,000 mg IntraVENous Q12H    insulin lispro  0-4 Units SubCUTAneous TID WC    insulin lispro  0-4 Units SubCUTAneous Nightly    vancomycin  750 mg IntraVENous Q12H       sodium chloride 100 mL/hr at 09/27/22 1823    dextrose        ondansetron **OR** ondansetron, polyethylene glycol, acetaminophen **OR** acetaminophen, oxyCODONE **OR** oxyCODONE, glucose, dextrose bolus **OR** dextrose bolus, glucagon (rDNA), dextrose     Labs:  CBC:   Recent Labs     09/26/22  1342 09/27/22  1327   WBC 13.5* 12.5*   HGB 9.6* 9.5*   HCT 29.8* 28.8*    355   MCV 88.5 88.1       Renal:    Recent Labs     09/26/22  1342 09/27/22  1327    135*   K 4.1 3.9    101   CO2 26 26   BUN 19 14   CREATININE 1.1 0.9   GLUCOSE 128* 140*   CALCIUM 9.2 8.7   ANIONGAP 12 8       Hepatic:   Recent Labs     09/26/22  1342   AST 17   ALT 17   BILITOT <0.2   PROT 7.5   LABALBU 3.6   ALKPHOS 99       Troponin: Invalid input(s): TROPONIN    Lactic acid: Invalid input(s): LACTICACID    BNP: No results for input(s): BNP in the last 72 hours.    Pro-BNP: No results for input(s): PROBNP in the last 72 hours. Lipids: No results for input(s): CHOL, TRIG, HDL, LDLCALC, VLDL in the last 72 hours. ABGs:  No results for input(s): PHART, WGI9VMG, PO2ART, MDG8SPM, BEART, THGBART, K0WVIQMS, CRD7VEV in the last 72 hours. VBGs: No results for input(s): PHVEN, MSU4DMQ, PO2VEN in the last 72 hours. INR:   Recent Labs     09/26/22  1342   INR 1.18*     aPTT: No results for input(s): APTT in the last 72 hours. Procalcitonin: No results for input(s): PROCAL in the last 72 hours. CRP:   Recent Labs     09/26/22  1342   .6*     ESR: No results for input(s): ESR in the last 72 hours. Radiology:  XR CHEST (2 VW)   Final Result      Bilateral peribronchial thickening representing bronchitis or asthma. No acute pulmonary consolidation. CT FOOT LEFT WO CONTRAST   Final Result   Impression: Small locules of soft tissue gas adjacent to the major fracture fragment of the left great toe. Correlation for open fracture versus soft tissue infection recommended. XR FOOT LEFT (MIN 3 VIEWS)   Final Result   Impression: Increased interval displacement of the previously noted fracture fragment of the great toe. No discrete soft tissue gas. Assessment and Plan:  Smita Rich is a 75 yo M who presents from facility for worsening of recent foot injury treated here at [facility] ER. L Hallux Infection 2/2 Traumatic Injury  Sepsis  Meets SIRS criteria   Bedside treated in ER on 9/17/22, unknown if complaint with wound dressings and Abx's since then  Patient febrile and hypertensive. ESR 90  -CT Foot: \"Small locules of soft tissue gas adjacent to the major fracture fragment of the left great toe. Correlation for open fracture versus soft tissue infection recommended\"  -Bedside I&D performed 9/26/22 in ER.  Plan for OR on 9/27/22  -Vanc and Cefepime  -non-weight bearing for now  -wound culture: MRSA, Corynebacterium striatum  -blood cultures: NGTD  -low-risk patient for intermediate-risk surgery  -PT/INR: 15.0 / 1.18  -EKG: performed  -CXR: \"Bilateral peribronchial thickening representing bronchitis or asthma. No acute pulmonary consolidation. \"  -OR today 9/27/22     Normocytic Anemia  Hg 9.6  -type and screen  -iron studies post-surgery: Iron, TIBC, Transferrin, Ferritin     Hypertension  -home lisinopril     Diabetes mellitus  -at home insulin  -HgA1c: awaiting new one  -hypoglycemia protocol  -corrective medium-dose algorithm   -Lispro 5 TID  -Lantus 15     Unspecified psychotic disorder  -home olanzapine re-ordered  -home quetiapine re-ordered     DVT PPx: Heparin subq  Diet:  ADULT DIET; Dysphagia - Soft and Bite Sized   Code status:  Full Code   ELOS: >3 nights  Barriers to discharge: I&D  Disposition  - Preadmission: [facility]  - Current: [facility]  - Upon discharge: TBA, possibly a different facility    Will discuss with attending physician Dr. Mendel Mimes, MD.     Bassem Aceves MD  Internal Medicine, PGY-1  Patient seen and examined, labs and imaging studies reviewed, agree with assessment and plan as outlined above. Continue with current care and plan. Discussed case with patients nurse, discussed case with care team, discussed plan.       Mendel Mimes, MD Doreen Chuck

## 2022-09-29 ENCOUNTER — APPOINTMENT (OUTPATIENT)
Dept: GENERAL RADIOLOGY | Age: 67
DRG: 853 | End: 2022-09-29
Payer: MEDICARE

## 2022-09-29 LAB
ANION GAP SERPL CALCULATED.3IONS-SCNC: 9 MMOL/L (ref 3–16)
BASOPHILS ABSOLUTE: 0.1 K/UL (ref 0–0.2)
BASOPHILS RELATIVE PERCENT: 0.5 %
BUN BLDV-MCNC: 12 MG/DL (ref 7–20)
CALCIUM SERPL-MCNC: 7.9 MG/DL (ref 8.3–10.6)
CHLORIDE BLD-SCNC: 106 MMOL/L (ref 99–110)
CO2: 23 MMOL/L (ref 21–32)
CREAT SERPL-MCNC: 0.8 MG/DL (ref 0.8–1.3)
EOSINOPHILS ABSOLUTE: 0.3 K/UL (ref 0–0.6)
EOSINOPHILS RELATIVE PERCENT: 2.6 %
GFR AFRICAN AMERICAN: >60
GFR NON-AFRICAN AMERICAN: >60
GLUCOSE BLD-MCNC: 129 MG/DL (ref 70–99)
GLUCOSE BLD-MCNC: 132 MG/DL (ref 70–99)
GLUCOSE BLD-MCNC: 149 MG/DL (ref 70–99)
GLUCOSE BLD-MCNC: 163 MG/DL (ref 70–99)
GLUCOSE BLD-MCNC: 172 MG/DL (ref 70–99)
HCT VFR BLD CALC: 27.1 % (ref 40.5–52.5)
HEMOGLOBIN: 8.9 G/DL (ref 13.5–17.5)
INR BLD: 1.17 (ref 0.87–1.14)
LYMPHOCYTES ABSOLUTE: 0.9 K/UL (ref 1–5.1)
LYMPHOCYTES RELATIVE PERCENT: 7 %
MCH RBC QN AUTO: 29 PG (ref 26–34)
MCHC RBC AUTO-ENTMCNC: 33 G/DL (ref 31–36)
MCV RBC AUTO: 87.8 FL (ref 80–100)
MONOCYTES ABSOLUTE: 0.9 K/UL (ref 0–1.3)
MONOCYTES RELATIVE PERCENT: 6.7 %
NEUTROPHILS ABSOLUTE: 10.6 K/UL (ref 1.7–7.7)
NEUTROPHILS RELATIVE PERCENT: 83.2 %
PDW BLD-RTO: 14.5 % (ref 12.4–15.4)
PERFORMED ON: ABNORMAL
PLATELET # BLD: 391 K/UL (ref 135–450)
PMV BLD AUTO: 8.6 FL (ref 5–10.5)
POTASSIUM SERPL-SCNC: 4 MMOL/L (ref 3.5–5.1)
PRO-BNP: 1663 PG/ML (ref 0–124)
PROTHROMBIN TIME: 14.8 SEC (ref 11.7–14.5)
RBC # BLD: 3.08 M/UL (ref 4.2–5.9)
SODIUM BLD-SCNC: 138 MMOL/L (ref 136–145)
WBC # BLD: 12.8 K/UL (ref 4–11)

## 2022-09-29 PROCEDURE — 2580000003 HC RX 258

## 2022-09-29 PROCEDURE — 6360000002 HC RX W HCPCS

## 2022-09-29 PROCEDURE — 6370000000 HC RX 637 (ALT 250 FOR IP)

## 2022-09-29 PROCEDURE — 85610 PROTHROMBIN TIME: CPT

## 2022-09-29 PROCEDURE — 83880 ASSAY OF NATRIURETIC PEPTIDE: CPT

## 2022-09-29 PROCEDURE — 99232 SBSQ HOSP IP/OBS MODERATE 35: CPT | Performed by: INTERNAL MEDICINE

## 2022-09-29 PROCEDURE — 80048 BASIC METABOLIC PNL TOTAL CA: CPT

## 2022-09-29 PROCEDURE — 85025 COMPLETE CBC W/AUTO DIFF WBC: CPT

## 2022-09-29 PROCEDURE — 36415 COLL VENOUS BLD VENIPUNCTURE: CPT

## 2022-09-29 PROCEDURE — 1200000000 HC SEMI PRIVATE

## 2022-09-29 PROCEDURE — 71045 X-RAY EXAM CHEST 1 VIEW: CPT

## 2022-09-29 RX ORDER — FUROSEMIDE 10 MG/ML
40 INJECTION INTRAMUSCULAR; INTRAVENOUS ONCE
Status: COMPLETED | OUTPATIENT
Start: 2022-09-29 | End: 2022-09-29

## 2022-09-29 RX ORDER — LISINOPRIL 20 MG/1
20 TABLET ORAL DAILY
Status: DISCONTINUED | OUTPATIENT
Start: 2022-09-29 | End: 2022-10-04

## 2022-09-29 RX ADMIN — INSULIN GLARGINE 15 UNITS: 100 INJECTION, SOLUTION SUBCUTANEOUS at 20:13

## 2022-09-29 RX ADMIN — HEPARIN SODIUM 5000 UNITS: 5000 INJECTION INTRAVENOUS; SUBCUTANEOUS at 20:11

## 2022-09-29 RX ADMIN — SODIUM CHLORIDE: 9 INJECTION, SOLUTION INTRAVENOUS at 09:42

## 2022-09-29 RX ADMIN — OXYCODONE 10 MG: 5 TABLET ORAL at 12:44

## 2022-09-29 RX ADMIN — IBUPROFEN 400 MG: 400 TABLET, FILM COATED ORAL at 12:44

## 2022-09-29 RX ADMIN — HEPARIN SODIUM 5000 UNITS: 5000 INJECTION INTRAVENOUS; SUBCUTANEOUS at 14:07

## 2022-09-29 RX ADMIN — INSULIN LISPRO 5 UNITS: 100 INJECTION, SOLUTION INTRAVENOUS; SUBCUTANEOUS at 09:07

## 2022-09-29 RX ADMIN — OLANZAPINE 5 MG: 5 TABLET, FILM COATED ORAL at 09:06

## 2022-09-29 RX ADMIN — OLANZAPINE 5 MG: 5 TABLET, FILM COATED ORAL at 20:11

## 2022-09-29 RX ADMIN — HEPARIN SODIUM 5000 UNITS: 5000 INJECTION INTRAVENOUS; SUBCUTANEOUS at 05:20

## 2022-09-29 RX ADMIN — VANCOMYCIN HYDROCHLORIDE 1000 MG: 10 INJECTION, POWDER, LYOPHILIZED, FOR SOLUTION INTRAVENOUS at 23:43

## 2022-09-29 RX ADMIN — CEFEPIME HYDROCHLORIDE 2000 MG: 2 INJECTION, POWDER, FOR SOLUTION INTRAMUSCULAR; INTRAVENOUS at 03:01

## 2022-09-29 RX ADMIN — VANCOMYCIN HYDROCHLORIDE 1000 MG: 10 INJECTION, POWDER, LYOPHILIZED, FOR SOLUTION INTRAVENOUS at 00:53

## 2022-09-29 RX ADMIN — INSULIN LISPRO 5 UNITS: 100 INJECTION, SOLUTION INTRAVENOUS; SUBCUTANEOUS at 17:20

## 2022-09-29 RX ADMIN — LISINOPRIL 20 MG: 20 TABLET ORAL at 09:06

## 2022-09-29 RX ADMIN — INSULIN LISPRO 5 UNITS: 100 INJECTION, SOLUTION INTRAVENOUS; SUBCUTANEOUS at 12:20

## 2022-09-29 RX ADMIN — CEFEPIME HYDROCHLORIDE 2000 MG: 2 INJECTION, POWDER, FOR SOLUTION INTRAMUSCULAR; INTRAVENOUS at 14:06

## 2022-09-29 RX ADMIN — IBUPROFEN 400 MG: 400 TABLET, FILM COATED ORAL at 17:19

## 2022-09-29 RX ADMIN — FUROSEMIDE 40 MG: 10 INJECTION, SOLUTION INTRAMUSCULAR; INTRAVENOUS at 14:08

## 2022-09-29 RX ADMIN — VANCOMYCIN HYDROCHLORIDE 1000 MG: 10 INJECTION, POWDER, LYOPHILIZED, FOR SOLUTION INTRAVENOUS at 12:19

## 2022-09-29 RX ADMIN — QUETIAPINE FUMARATE 25 MG: 25 TABLET ORAL at 20:11

## 2022-09-29 NOTE — PROGRESS NOTES
Pre-Operative Note  Resident Note     Patient: Kenny Li     Procedure: Left foot incision and drainage with possible hallux amputation and possible delayed primary closure    Clearance for surgery: Yes, per Internal Medicine    Consent: Procedure was discussed at length with patient and all questions were answered to completion, consent obtained and placed in chart     Labs:        Recent Labs     09/28/22  0943 09/29/22  0755   WBC 11.5* 12.8*   HGB 8.9* 8.9*   HCT 27.5* 27.1*   MCV 88.6 87.8    391        Recent Labs     09/28/22  0943 09/29/22  0829    138   K 3.9 4.0    106   CO2 25 23   BUN 14 12   CREATININE 1.0 0.8      No results for input(s): AST, ALT, ALB, BILIDIR, BILITOT, ALKPHOS in the last 72 hours. No results for input(s): LIPASE, AMYLASE in the last 72 hours. Recent Labs     09/29/22  1931   INR 1.17*      No results for input(s): CKTOTAL, CKMB, CKMBINDEX, TROPONINI in the last 72 hours.     Pre-op Medications:   Current Facility-Administered Medications   Medication Dose Route Frequency Provider Last Rate Last Admin    lisinopril (PRINIVIL;ZESTRIL) tablet 20 mg  20 mg Oral Daily Osmar Thomas, DO   20 mg at 09/29/22 0906    insulin lispro (1 Unit Dial) 0-8 Units  0-8 Units SubCUTAneous TID SANTI Chiang MD   4 Units at 09/28/22 0954    insulin lispro (1 Unit Dial) 0-4 Units  0-4 Units SubCUTAneous Nightly John Chiang MD        vancomycin (VANCOCIN) 1,000 mg in dextrose 5 % 250 mL IVPB  1,000 mg IntraVENous Q12H John Chiang MD   Stopped at 09/30/22 0116    insulin glargine (LANTUS;BASAGLAR) injection pen 15 Units  15 Units SubCUTAneous Nightly Genny Shah MD   15 Units at 09/29/22 2013    insulin lispro (1 Unit Dial) 5 Units  5 Units SubCUTAneous TID SANTI Shah MD   5 Units at 09/29/22 1720    ibuprofen (ADVIL;MOTRIN) tablet 400 mg  400 mg Oral Q6H PRN Luzmaria Benavides MD   400 mg at 09/29/22 1719    ondansetron (ZOFRAN-ODT) disintegrating tablet 4 mg  4 mg Oral Q8H PRN [facility], DPM        Or    ondansetron (ZOFRAN) injection 4 mg  4 mg IntraVENous Q6H PRN [facility], DPM        polyethylene glycol (GLYCOLAX) packet 17 g  17 g Oral Daily PRN [facility], DPM        acetaminophen (TYLENOL) tablet 650 mg  650 mg Oral Q6H PRN [facility], DPM   650 mg at 09/28/22 2043    Or    acetaminophen (TYLENOL) suppository 650 mg  650 mg Rectal Q6H PRN [facility], DPM        OLANZapine (ZYPREXA) tablet 5 mg  5 mg Oral BID [facility], DPM   5 mg at 09/29/22 2011    QUEtiapine (SEROQUEL) tablet 25 mg  25 mg Oral Nightly [facility], DPM   25 mg at 09/29/22 2011    [Held by provider] heparin (porcine) injection 5,000 Units  5,000 Units SubCUTAneous 3 times per day [place], DPM   5,000 Units at 09/29/22 2011    oxyCODONE (ROXICODONE) immediate release tablet 5 mg  5 mg Oral Q4H PRN [facility], DPM   5 mg at 09/28/22 2043    Or    oxyCODONE (ROXICODONE) immediate release tablet 10 mg  10 mg Oral Q4H PRN [facility], DPM   10 mg at 09/29/22 1244    cefepime (MAXIPIME) 2000 mg IVPB minibag  2,000 mg IntraVENous Q12H [place], DPM 12.5 mL/hr at 09/30/22 0235 2,000 mg at 09/30/22 0235    glucose chewable tablet 16 g  4 tablet Oral PRN [facility], DPM        dextrose bolus 10% 125 mL  125 mL IntraVENous PRN [facility], DPM        Or    dextrose bolus 10% 250 mL  250 mL IntraVENous PRN [facility], DPM        glucagon (rDNA) injection 1 mg  1 mg SubCUTAneous PRN Mahesh Salvadormol, DPM        dextrose 10 % infusion   IntraVENous Continuous PRN [place], DPM           Diet: Diet NPO Exceptions are: Sips of Water with Meds    CXR: Significant progression of diffuse interstitial abnormality with new right lower lung and left upper lung alveolar consolidation comparison to 9/26.   Differential includes asymmetric pulmonary edema and multifocal pneumonia    EKG: See cardio section of EMR    Echocardiogram: not done    IV access/ saline lock: Yes    Antibiotics: Vancomycin and cefepime    PT/INR: 14.8/1.17    Type and Screen: O positive and antibody negative    Pregnancy test: Not warranted    COVID-19: Not Tested    Known risk factors for perioperative complications:   Diabetes mellitus type II, HTN, and iron deficiency anemia      Anesthesia to see patient.     Irineo Cabral DPM  09/30/22  6:21 AM

## 2022-09-29 NOTE — CARE COORDINATION
CM  following for  d/c planning:    CM  spoke with pt  sister  at  bedside  who was inquiring  about  SNF referrals  As they really want patient to be near them in [place], [id number] [place] [address] [address]   -  Patient having surgery on this Friday. ID  Following  :    Plan:      -Continue vancomycin and cefepime  -Continue to monitor for signs for wound infection   -OR tomorrow for wound closure. Anticipate need iv antibiotics after discharge given findings at surgery 9/30     OT=14,/24  PT=10/24     The choices they made for SNFs are:     [facility]  [address] [facility], [address]  Phone:  [phone]:  [phone]  CM  spoke with  Qasim Raygoza in admissions and  they provide  fax  #  for  review  and  will call CM  back with determination . [address] [place] [address], [address], [address]  Phone: [phone]      Fax: [phone]    Referral faxed  :  CM  spoke with [facility] in admissions , they have  SNF  but  currently no LTC  beds  . They will review  and  let  CM  know if they are able to accept  .  will follow up with admissions. [facility] at [address]  Phone: [phone]   Fax: [phone]    CM spoke with admissions and faxed  clinicals for review        CM will continue to follow patient until discharge.       Electronically signed by Tiff Santo RN on 9/29/2022 at 2:00 PM

## 2022-09-29 NOTE — PROGRESS NOTES
Internal Medicine Progress Note    Patient Name: Yasmine Velez   Patient : 1955   Date: 2022   Admit Date: 2022     CC: Foot Pain (Pt reports he injured left foot last week and it was sutured. Today nurse from [facility] noticed swelling and redness around the foot. Pain 8/10)       Interval History: Patient seen and examined at bedside. Fever overnight. Patient will go back to OR on Friday. Patient has new oxygen requirement of 3-4L NC. CXR shows increase in either edema or infection with clinical correlate. Discontinued fluids and will see how spot dose of Lasix works. Patient already on ABx. Pro-BNP elevated, with no comparison available. Lisinopril home dose re-ordered. Patient denies all complaints, including headache, CP, SOB, bowel or urinary changes. HPI: Yasmine Velez is a 74 y.o. M who presents from [address] [place] [address] for worsening of a recent foot injury. Notably, he was seen on 22 in the ER for an open intra-articular fracture thought the base of the first digit on the left. It was treated at bedside with flushing and repair by podiatry at that time, and the patient was given Ancef in the ER and sent home on Keflex. Patient did not follow up with Dr. Cheri Ferreira outpatient as he was directed. Patient is Alert and Orientated x 2 (to person, place, but not year) and states he has trouble figuring out words. He is unable to tell me about his PMHx. His daughters were present when Podiatry saw the patient, and reportedly the family states they are unsure if the patient's facility did dressing changes or administered his antibiotics. Patient is a poor historian and is unable to corrobarate taking meds or not. Patient does endorse fevers. Family noticed redness and drainage from his foot wound. Patient complains of pain, even when not moving his foot. In the ED: Podiatry performed a bedside I&D and obtained wound cultures.         ROS:  As per interval history above.      Vital Signs:  Patient Vitals for the past 8 hrs:   BP Temp Temp src Pulse Resp SpO2   09/29/22 0807 (!) 183/78 98.1 °F (36.7 °C) Oral 90 24 --   09/29/22 0341 (!) 142/74 97.1 °F (36.2 °C) Axillary 80 28 93 %       Physical Exam:  Physical Exam  Eyes:      Extraocular Movements: Extraocular movements intact. Cardiovascular:      Rate and Rhythm: Normal rate and regular rhythm. Pulmonary:      Effort: Pulmonary effort is normal.      Breath sounds: Normal breath sounds. Abdominal:      General: Abdomen is flat. Palpations: Abdomen is soft. Musculoskeletal:         General: Signs of injury present. Cervical back: Normal range of motion. Skin:     General: Skin is warm and dry. Neurological:      Mental Status: He is alert.       Comments: Oriented to person and place, not year        Intake/Output Summary (Last 24 hours) at 9/29/2022 0830  Last data filed at 9/28/2022 2346  Gross per 24 hour   Intake 800 ml   Output 500 ml   Net 300 ml        Medications:   insulin lispro  0-8 Units SubCUTAneous TID WC    insulin lispro  0-4 Units SubCUTAneous Nightly    vancomycin  1,000 mg IntraVENous Q12H    insulin glargine  15 Units SubCUTAneous Nightly    insulin lispro  5 Units SubCUTAneous TID WC    OLANZapine  5 mg Oral BID    QUEtiapine  25 mg Oral Nightly    heparin (porcine)  5,000 Units SubCUTAneous 3 times per day    cefepime  2,000 mg IntraVENous Q12H       sodium chloride 100 mL/hr at 09/28/22 1845    dextrose        ibuprofen, ondansetron **OR** ondansetron, polyethylene glycol, acetaminophen **OR** acetaminophen, oxyCODONE **OR** oxyCODONE, glucose, dextrose bolus **OR** dextrose bolus, glucagon (rDNA), dextrose     Labs:  CBC:   Recent Labs     09/26/22  1342 09/27/22  1327 09/28/22  0943   WBC 13.5* 12.5* 11.5*   HGB 9.6* 9.5* 8.9*   HCT 29.8* 28.8* 27.5*    355 356   MCV 88.5 88.1 88.6       Renal:    Recent Labs     09/26/22  1342 09/27/22  1327 09/28/22  0943    135* 137   K 4.1 3.9 3.9    101 103   CO2 26 26 25   BUN 19 14 14   CREATININE 1.1 0.9 1.0   GLUCOSE 128* 140* 276*   CALCIUM 9.2 8.7 8.1*   ANIONGAP 12 8 9       Hepatic:   Recent Labs     09/26/22  1342   AST 17   ALT 17   BILITOT <0.2   PROT 7.5   LABALBU 3.6   ALKPHOS 99       Troponin: Invalid input(s): TROPONIN    Lactic acid: Invalid input(s): LACTICACID    BNP: No results for input(s): BNP in the last 72 hours. Pro-BNP: No results for input(s): PROBNP in the last 72 hours. Lipids: No results for input(s): CHOL, TRIG, HDL, LDLCALC, VLDL in the last 72 hours. ABGs:  No results for input(s): PHART, KDX5SJS, PO2ART, AVT4LTW, BEART, THGBART, N3FVOSIS, LPC8WDV in the last 72 hours. VBGs: No results for input(s): PHVEN, NUV8ZSD, PO2VEN in the last 72 hours. INR:   Recent Labs     09/26/22  1342   INR 1.18*     aPTT: No results for input(s): APTT in the last 72 hours. Procalcitonin: No results for input(s): PROCAL in the last 72 hours. CRP:   Recent Labs     09/26/22  1342   .6*     ESR: No results for input(s): ESR in the last 72 hours. Radiology:  XR CHEST (2 VW)   Final Result      Bilateral peribronchial thickening representing bronchitis or asthma. No acute pulmonary consolidation. CT FOOT LEFT WO CONTRAST   Final Result   Impression: Small locules of soft tissue gas adjacent to the major fracture fragment of the left great toe. Correlation for open fracture versus soft tissue infection recommended. XR FOOT LEFT (MIN 3 VIEWS)   Final Result   Impression: Increased interval displacement of the previously noted fracture fragment of the great toe. No discrete soft tissue gas. Assessment and Plan:  Suzi Ferrera is a 73 yo M who presents from facility for worsening of recent foot injury treated here at [facility] ER.       L Hallux Infection 2/2 Traumatic Injury  Sepsis  Meets SIRS criteria   Bedside treated in ER on 9/17/22, unknown if complaint with wound dressings and Abx's since then  Patient febrile and hypertensive. ESR 90  -CT Foot: \"Small locules of soft tissue gas adjacent to the major fracture fragment of the left great toe. Correlation for open fracture versus soft tissue infection recommended\"  -Bedside I&D performed 9/26/22 in ER. Plan for OR on 9/27/22  -Vanc and Cefepime  -non-weight bearing for now  -wound culture: MRSA, Corynebacterium striatum  -blood cultures: NGTD  -low-risk patient for intermediate-risk surgery  -PT/INR: 15.0 / 1.18  -EKG: performed  -CXR: \"Bilateral peribronchial thickening representing bronchitis or asthma. No acute pulmonary consolidation. \"  -OR today 9/27/22  -CXR: \"Significant progression of diffuse interstitial abnormality with new right lower lung and left upper lung alveolar consolidation in comparison to 9/26/2022. There is history of fever and elevated white blood cell count. Differential considerations   include asymmetric pulmonary edema and multifocal pneumonia. \"   -fluids stopped, spot dose lasix, Pro-BNP 1,663     Normocytic Anemia  Hg 9.6  -type and screen  -iron studies post-surgery: Iron, TIBC, Transferrin, Ferritin     Hypertension  -home lisinopril re-ordered in hospital.      Diabetes mellitus  -at home insulin  -HgA1c: awaiting new one  -hypoglycemia protocol  -corrective medium-dose algorithm   -Lispro 5 TID  -Lantus 15     Unspecified psychotic disorder  -home olanzapine re-ordered  -home quetiapine re-ordered     DVT PPx: Heparin subq  Diet:  ADULT DIET;  Dysphagia - Soft and Bite Sized   Code status:  Full Code   ELOS: >3 nights  Barriers to discharge: I&D  Disposition  - Preadmission: [facility]  - Current: [facility]  - Upon discharge: TBA, possibly a different facility    Will discuss with attending physician Dr. Mary Campos MD.     Krishna Woods MD  Internal Medicine, PGY-1

## 2022-09-29 NOTE — PLAN OF CARE
Problem: Safety - Adult  Goal: Free from fall injury  Outcome: Progressing   Patient has not attempted to get out bed. Bed alarm on, locked and low   Problem: Skin/Tissue Integrity  Goal: Absence of new skin breakdown  Description: 1. Monitor for areas of redness and/or skin breakdown  2. Assess vascular access sites hourly  3. Every 4-6 hours minimum:  Change oxygen saturation probe site  4. Every 4-6 hours:  If on nasal continuous positive airway pressure, respiratory therapy assess nares and determine need for appliance change or resting period. Outcome: Progressing   Patient turned q2. Skin has remained dry. Male purewick placed to promote dry skin.

## 2022-09-29 NOTE — PROGRESS NOTES
ID Follow-up NOTE  RESIDENT NOTE - reviewed / edited, attending note at bottom    CC:   L Traumatic foot infection / osteomyelitis  Antibiotics: Vancomycin, Cefepime    Admit Date: 9/26/2022  Hospital Day: 4    Subjective:     Patient was seen and examined at bedside   He had no complains. He denied any fevers or chills. He denied any tenderness with the bandaged wound. Objective:     Patient Vitals for the past 8 hrs:   BP Temp Temp src Pulse Resp SpO2   09/29/22 0807 (!) 183/78 98.1 °F (36.7 °C) Oral 90 24 94 %   09/29/22 0341 (!) 142/74 97.1 °F (36.2 °C) Axillary 80 28 93 %     Temp: 100.8; 102.3F last night    I/O last 3 completed shifts: In: 4553 [P.O.:1040]  Out: 700 [Urine:700]  I/O this shift:  In: 240 [P.O.:240]  Out: -     EXAM:  GENERAL: No apparent distress.   3L  HEENT: Membranes moist, no oral lesion  NECK:  Supple, no lymphadenopathy  LUNGS: Clear b/l, no rales, no dullness  CARDIAC: RRR, no murmur appreciated  ABD:  + BS, soft / NT  EXT:  No rash, no edema, no lesions  L foot with dressing, dry  NEURO: No focal neurologic findings  PSYCH: More Oriented today with clear speech , sensorium, mood normal  LINES:  Peripheral IV        Data Review:  Lab Results   Component Value Date    WBC 12.8 (H) 09/29/2022    HGB 8.9 (L) 09/29/2022    HCT 27.1 (L) 09/29/2022    MCV 87.8 09/29/2022     09/29/2022     Lab Results   Component Value Date    CREATININE 0.8 09/29/2022    BUN 12 09/29/2022     09/29/2022    K 4.0 09/29/2022     09/29/2022    CO2 23 09/29/2022       Hepatic Function Panel:   Lab Results   Component Value Date/Time    ALKPHOS 99 09/26/2022 01:42 PM    ALT 17 09/26/2022 01:42 PM    AST 17 09/26/2022 01:42 PM    PROT 7.5 09/26/2022 01:42 PM    BILITOT <0.2 09/26/2022 01:42 PM    LABALBU 3.6 09/26/2022 01:42 PM       MICRO:  (9/26) Wound culture positive MRSA, C.Striatum  (9/26) Blood cultures shows no growth yet    IMAGING:  (9/27)CT foot Small locules of soft tissue gas adjacent to the major fracture fragment of the left great toe. Correlation for open fracture versus soft tissue infection recommended    Scheduled Meds:   lisinopril  20 mg Oral Daily    insulin lispro  0-8 Units SubCUTAneous TID WC    insulin lispro  0-4 Units SubCUTAneous Nightly    vancomycin  1,000 mg IntraVENous Q12H    insulin glargine  15 Units SubCUTAneous Nightly    insulin lispro  5 Units SubCUTAneous TID WC    OLANZapine  5 mg Oral BID    QUEtiapine  25 mg Oral Nightly    heparin (porcine)  5,000 Units SubCUTAneous 3 times per day    cefepime  2,000 mg IntraVENous Q12H       Continuous Infusions:   sodium chloride 100 mL/hr at 09/29/22 0942    dextrose         PRN Meds:  ibuprofen, ondansetron **OR** ondansetron, polyethylene glycol, acetaminophen **OR** acetaminophen, oxyCODONE **OR** oxyCODONE, glucose, dextrose bolus **OR** dextrose bolus, glucagon (rDNA), dextrose      Assessment:     L Hallux Infection 2/2 Traumatic Injury  Bedside treated in ER on 9/17/22, unknown if complaint with wound dressings and Abx's since then  Patient febrile and hypertensive. ESR 90,WBC 11.5  -CT Foot: \"Small locules of soft tissue gas adjacent to the major fracture fragment of the left great toe. Correlation for open fracture versus soft tissue infection recommended\"  -Bedside I&D performed 9/26/22 in ER. I&D and bone biopsy on 9/27/22  -started on Vanc and Cefepime  -non-weight bearing for now  -wound culture positive for MRSA, C. striatum  -follow up blood cultures no growth yet  -Follow up acid fast bacilli culture no growth yet      Plan:     -Continue vancomycin and cefepime  -Continue to monitor for signs for wound infection   -OR tomorrow for wound closure. Discussed with Dr. Marbella Hamlin MD PGY-1     Addendum to Resident Progress note:  Pt seen, examined and evaluated.  I have reviewed the current history, physical findings, labs and assessment and plan and agree with note as documented by resident (Dr. Zully Aguillon). 73 yo man with hx DM, HTN, psych d/o  L[facility] in [facility]     Pt sustained L hallux injury (running across the road), sustained open fx. Seen in [facility] ED 9/17, wound sutured, given po keflex. He developed swelling, pain, drainage      Presented to ED on 9/26 - T 101.2, Cr 13.5  X-ray - displaced fracture L hallux  CT - ST gas adjacent to hallux fracture fragment  Wound cult sent - GS 3+GPC. Cult MRSA, C striatum  Admit,, started on Vancomycin + Cefepime  Seen by Podiatry, plan for I & D     9/27 - afeb, WBC 12.5. OR resection L hallux, noted to have fluctuance in plantar and dorsal aspect of 1st MT head  L foot with dressing   OR cult S aureus, E faecalis     9/28 Tm 101.1, WBC 11.5    9/29 Tm 102.5. WBC 12.8     IMP/  L hallux open fracture with infection   - x-ray with gas   - wound cult - MRSA, C striatum   - OR purulence    Fever - poss due to R foot  Leukocytosis     REC/   Cont Vancomycin + Cefepime for now  Will f/u on BC (no growth to date), OR cultures  Wound care and return to OR per Podiatry     Anticipate need iv antibiotics after discharge given findings at surgery 9/30     Medical Decision Making:   The following items were considered in medical decision making:  Discussion of patient care with other providers  Review / order clinical lab tests  Review / order radiology tests  Review / order other diagnostic tests/interventions  Microbiology cultures and other micro tests reviewed       Discussed with pt, Medical Resident  Aditya Mondragon MD

## 2022-09-29 NOTE — PROGRESS NOTES
Clinical Pharmacy Progress Note    Vancomycin - Management by Pharmacy    Consult Date(s): 9/26/22  Consulting Provider(s): Dr. Bernstein Proper / Plan  1)  Left hallux infection s/p traumatic open fracture - Vancomycin  Concurrent Antimicrobials: Cefepime  Day of Vanc Therapy / Ordered Duration: 4 of 7  Current Dosing Method: Bayesian-Guided AUC Dosing  Therapeutic Goal: -600 mg/L*hr  Current Dose / Plan:   Dose increased to 1000mg IV q12h yesterday based on level. Regimen initially predicted an AUC = 494 with trough = 16.4. SCr improved to 0.8 today, and estimated AUC now 420. Random level is ordered for 9/30 AM to further evaluate above regimen due to AUC now at low end of range and MRSA growing in culture. Will continue to monitor clinical condition and make adjustments to regimen as appropriate. Wound culture is growing MRSA with Vanc SHAYE = 2, may indicate higher risk of treatment failure. May consider changing to Linezolid 600mg po/IV q12h - ID is following; defer abx decisions to them. Please call with questions--  Thanks--  Hayden Neri, HeribertoD, BCPS, BCGP  [id number] ([facility])   9/29/2022 10:04 AM      Interval update:   Planning for further debridement and possible hallux amputation on Friday. Wound culture growing MRSA (Vanc SHAYE = 2) and corynebacterium. Surgical cultures in process. Subjective/Objective:   Segun Garg is a 74 y.o. male with a PMHx significant for HTN and DM who is admitted with worsening redness and drainage from left hallux wound. Pt was recently seen in ER 9/17 for an open intra-articular fracture of left hallux. Pt is s/p left foot I&D with bone biopsy (done 9/27). Pharmacy is consulted to dose Vancomycin.     Ht Readings from Last 1 Encounters:   09/26/22 6' (1.829 m)     Wt Readings from Last 1 Encounters:   09/26/22 187 lb 6.4 oz (85 kg)     Current & Prior Antimicrobial Regimen(s):  Cefepime 2000mg EI q12h (9/26-current)  Vancomycin - Pharmacy to dose   2000mg IV x1 9/26 14:30   750mg IV q12h (9/27-9/28)   1000mg IV q12h (9/28-current)    Vancomycin Level(s) / Doses:    Date Time Dose Level / Type of Level Interpretation   9/28 09:43 750mg q12h Random = 14.9 mcg/mL Drawn ~5 hr after 4th total dose  Calculated AUC = 375  Increase to 1000mg q12h   9/30 06:00 1000mg q12h Random = ordered    Note: Serum levels collected for AUC-based dosing may be high if collected in close proximity to the dose administered. This is not necessarily indicative of toxicity. Cultures & Sensitivities:    Date Site Micro Susceptibility / Result   9/26 Blood x2 No growth to date    9/26 Wound MRSA  Corynebacterium Vanc SHAYE = 2  No further w/u   9/27 Surgical tissue In process      Recent Labs     09/27/22  1327 09/28/22  0943 09/29/22  0755 09/29/22  0829   CREATININE 0.9 1.0  --  0.8   BUN 14 14  --  12   WBC 12.5* 11.5* 12.8*  --          Estimated Creatinine Clearance: 101 mL/min (based on SCr of 0.8 mg/dL). Additional Lab Values / Findings of Note:    No results for input(s): PROCAL in the last 72 hours.

## 2022-09-29 NOTE — PLAN OF CARE
Podiatric Surgery Plan of Care Note  Kenny Li     Discussed surgical intervention with patient at bedside with patient and his sister. All potential risks, benefits, and complications were discussed with the patient prior to the scheduling of surgery. No guarantees or promises were made to what the outcomes will be. The patient and sister wished to proceed with surgery, and informed written consent was obtained, signed, and placed on the patient's chart.    -EKG performed  -PT/INR reviewed  -CXR performed  -Anti-coagulations will be held at midnight  -Patient will be made NPO at midnight  -Patient cleared by medicine team for surgery    Robel Kingsley DPM  Podiatric Resident PGY-1  Pager [phone] or Perfect Serve

## 2022-09-30 ENCOUNTER — ANESTHESIA EVENT (OUTPATIENT)
Dept: OPERATING ROOM | Age: 67
DRG: 853 | End: 2022-09-30
Payer: MEDICARE

## 2022-09-30 ENCOUNTER — ANESTHESIA (OUTPATIENT)
Dept: OPERATING ROOM | Age: 67
DRG: 853 | End: 2022-09-30
Payer: MEDICARE

## 2022-09-30 LAB
ANION GAP SERPL CALCULATED.3IONS-SCNC: 11 MMOL/L (ref 3–16)
BASOPHILS ABSOLUTE: 0 K/UL (ref 0–0.2)
BASOPHILS RELATIVE PERCENT: 0.2 %
BLOOD CULTURE, ROUTINE: NORMAL
BUN BLDV-MCNC: 13 MG/DL (ref 7–20)
CALCIUM SERPL-MCNC: 8.6 MG/DL (ref 8.3–10.6)
CHLORIDE BLD-SCNC: 103 MMOL/L (ref 99–110)
CO2: 25 MMOL/L (ref 21–32)
CREAT SERPL-MCNC: 1 MG/DL (ref 0.8–1.3)
CULTURE, BLOOD 2: NORMAL
EOSINOPHILS ABSOLUTE: 0.2 K/UL (ref 0–0.6)
EOSINOPHILS RELATIVE PERCENT: 1.7 %
GFR AFRICAN AMERICAN: >60
GFR NON-AFRICAN AMERICAN: >60
GLUCOSE BLD-MCNC: 107 MG/DL (ref 70–99)
GLUCOSE BLD-MCNC: 177 MG/DL (ref 70–99)
GLUCOSE BLD-MCNC: 282 MG/DL (ref 70–99)
GLUCOSE BLD-MCNC: 58 MG/DL (ref 70–99)
GLUCOSE BLD-MCNC: 65 MG/DL (ref 70–99)
GLUCOSE BLD-MCNC: 71 MG/DL (ref 70–99)
GLUCOSE BLD-MCNC: 74 MG/DL (ref 70–99)
GLUCOSE BLD-MCNC: 95 MG/DL (ref 70–99)
HCT VFR BLD CALC: 25.8 % (ref 40.5–52.5)
HEMOGLOBIN: 8.6 G/DL (ref 13.5–17.5)
LYMPHOCYTES ABSOLUTE: 1.4 K/UL (ref 1–5.1)
LYMPHOCYTES RELATIVE PERCENT: 10.5 %
MCH RBC QN AUTO: 29 PG (ref 26–34)
MCHC RBC AUTO-ENTMCNC: 33.5 G/DL (ref 31–36)
MCV RBC AUTO: 86.6 FL (ref 80–100)
MONOCYTES ABSOLUTE: 1.2 K/UL (ref 0–1.3)
MONOCYTES RELATIVE PERCENT: 9 %
NEUTROPHILS ABSOLUTE: 10.8 K/UL (ref 1.7–7.7)
NEUTROPHILS RELATIVE PERCENT: 78.6 %
PDW BLD-RTO: 14.5 % (ref 12.4–15.4)
PERFORMED ON: ABNORMAL
PERFORMED ON: NORMAL
PLATELET # BLD: 420 K/UL (ref 135–450)
PMV BLD AUTO: 8.1 FL (ref 5–10.5)
POTASSIUM SERPL-SCNC: 3.5 MMOL/L (ref 3.5–5.1)
RBC # BLD: 2.98 M/UL (ref 4.2–5.9)
SODIUM BLD-SCNC: 139 MMOL/L (ref 136–145)
VANCOMYCIN RANDOM: 17.7 UG/ML
WBC # BLD: 13.8 K/UL (ref 4–11)

## 2022-09-30 PROCEDURE — 6360000002 HC RX W HCPCS

## 2022-09-30 PROCEDURE — 6370000000 HC RX 637 (ALT 250 FOR IP): Performed by: PODIATRIST

## 2022-09-30 PROCEDURE — 2580000003 HC RX 258: Performed by: INTERNAL MEDICINE

## 2022-09-30 PROCEDURE — 36415 COLL VENOUS BLD VENIPUNCTURE: CPT

## 2022-09-30 PROCEDURE — 6370000000 HC RX 637 (ALT 250 FOR IP)

## 2022-09-30 PROCEDURE — 2580000003 HC RX 258

## 2022-09-30 PROCEDURE — 3600000014 HC SURGERY LEVEL 4 ADDTL 15MIN: Performed by: PODIATRIST

## 2022-09-30 PROCEDURE — 2500000003 HC RX 250 WO HCPCS: Performed by: INTERNAL MEDICINE

## 2022-09-30 PROCEDURE — 80202 ASSAY OF VANCOMYCIN: CPT

## 2022-09-30 PROCEDURE — 2709999900 HC NON-CHARGEABLE SUPPLY: Performed by: PODIATRIST

## 2022-09-30 PROCEDURE — 6360000002 HC RX W HCPCS: Performed by: INTERNAL MEDICINE

## 2022-09-30 PROCEDURE — 3700000000 HC ANESTHESIA ATTENDED CARE: Performed by: PODIATRIST

## 2022-09-30 PROCEDURE — A4217 STERILE WATER/SALINE, 500 ML: HCPCS | Performed by: PODIATRIST

## 2022-09-30 PROCEDURE — 0JBR0ZZ EXCISION OF LEFT FOOT SUBCUTANEOUS TISSUE AND FASCIA, OPEN APPROACH: ICD-10-PCS | Performed by: PODIATRIST

## 2022-09-30 PROCEDURE — 2500000003 HC RX 250 WO HCPCS: Performed by: PODIATRIST

## 2022-09-30 PROCEDURE — 7100000000 HC PACU RECOVERY - FIRST 15 MIN: Performed by: PODIATRIST

## 2022-09-30 PROCEDURE — 7100000001 HC PACU RECOVERY - ADDTL 15 MIN: Performed by: PODIATRIST

## 2022-09-30 PROCEDURE — 1200000000 HC SEMI PRIVATE

## 2022-09-30 PROCEDURE — 3700000001 HC ADD 15 MINUTES (ANESTHESIA): Performed by: PODIATRIST

## 2022-09-30 PROCEDURE — 2580000003 HC RX 258: Performed by: PODIATRIST

## 2022-09-30 PROCEDURE — 80048 BASIC METABOLIC PNL TOTAL CA: CPT

## 2022-09-30 PROCEDURE — 99232 SBSQ HOSP IP/OBS MODERATE 35: CPT | Performed by: INTERNAL MEDICINE

## 2022-09-30 PROCEDURE — 85025 COMPLETE CBC W/AUTO DIFF WBC: CPT

## 2022-09-30 PROCEDURE — 3600000004 HC SURGERY LEVEL 4 BASE: Performed by: PODIATRIST

## 2022-09-30 RX ORDER — PROCHLORPERAZINE EDISYLATE 5 MG/ML
5 INJECTION INTRAMUSCULAR; INTRAVENOUS
Status: CANCELLED | OUTPATIENT
Start: 2022-09-30 | End: 2022-10-01

## 2022-09-30 RX ORDER — MEPERIDINE HYDROCHLORIDE 25 MG/ML
12.5 INJECTION INTRAMUSCULAR; INTRAVENOUS; SUBCUTANEOUS EVERY 5 MIN PRN
Status: CANCELLED | OUTPATIENT
Start: 2022-09-30

## 2022-09-30 RX ORDER — SODIUM CHLORIDE 9 MG/ML
INJECTION, SOLUTION INTRAVENOUS CONTINUOUS
Status: CANCELLED | OUTPATIENT
Start: 2022-09-30

## 2022-09-30 RX ORDER — DIPHENHYDRAMINE HYDROCHLORIDE 50 MG/ML
12.5 INJECTION INTRAMUSCULAR; INTRAVENOUS
Status: CANCELLED | OUTPATIENT
Start: 2022-09-30 | End: 2022-10-01

## 2022-09-30 RX ORDER — SODIUM CHLORIDE 0.9 % (FLUSH) 0.9 %
5-40 SYRINGE (ML) INJECTION EVERY 12 HOURS SCHEDULED
Status: DISCONTINUED | OUTPATIENT
Start: 2022-09-30 | End: 2022-10-05 | Stop reason: HOSPADM

## 2022-09-30 RX ORDER — LORAZEPAM 2 MG/ML
0.5 INJECTION INTRAMUSCULAR
Status: CANCELLED | OUTPATIENT
Start: 2022-09-30 | End: 2022-10-01

## 2022-09-30 RX ORDER — LIDOCAINE HYDROCHLORIDE 10 MG/ML
5 INJECTION, SOLUTION EPIDURAL; INFILTRATION; INTRACAUDAL; PERINEURAL ONCE
Status: COMPLETED | OUTPATIENT
Start: 2022-09-30 | End: 2022-09-30

## 2022-09-30 RX ORDER — SODIUM CHLORIDE 0.9 % (FLUSH) 0.9 %
5-40 SYRINGE (ML) INJECTION PRN
Status: CANCELLED | OUTPATIENT
Start: 2022-09-30

## 2022-09-30 RX ORDER — LABETALOL HYDROCHLORIDE 5 MG/ML
10 INJECTION, SOLUTION INTRAVENOUS
Status: CANCELLED | OUTPATIENT
Start: 2022-09-30

## 2022-09-30 RX ORDER — LIDOCAINE HYDROCHLORIDE 10 MG/ML
INJECTION, SOLUTION EPIDURAL; INFILTRATION; INTRACAUDAL; PERINEURAL PRN
Status: DISCONTINUED | OUTPATIENT
Start: 2022-09-30 | End: 2022-09-30 | Stop reason: HOSPADM

## 2022-09-30 RX ORDER — FENTANYL CITRATE 50 UG/ML
25 INJECTION, SOLUTION INTRAMUSCULAR; INTRAVENOUS EVERY 5 MIN PRN
Status: CANCELLED | OUTPATIENT
Start: 2022-09-30

## 2022-09-30 RX ORDER — ONDANSETRON 2 MG/ML
INJECTION INTRAMUSCULAR; INTRAVENOUS PRN
Status: DISCONTINUED | OUTPATIENT
Start: 2022-09-30 | End: 2022-09-30 | Stop reason: SDUPTHER

## 2022-09-30 RX ORDER — SODIUM CHLORIDE 9 MG/ML
INJECTION, SOLUTION INTRAVENOUS CONTINUOUS PRN
Status: DISCONTINUED | OUTPATIENT
Start: 2022-09-30 | End: 2022-09-30 | Stop reason: SDUPTHER

## 2022-09-30 RX ORDER — ONDANSETRON 2 MG/ML
4 INJECTION INTRAMUSCULAR; INTRAVENOUS
Status: CANCELLED | OUTPATIENT
Start: 2022-09-30 | End: 2022-10-01

## 2022-09-30 RX ORDER — OXYCODONE HYDROCHLORIDE 5 MG/1
10 TABLET ORAL PRN
Status: CANCELLED | OUTPATIENT
Start: 2022-09-30 | End: 2022-09-30

## 2022-09-30 RX ORDER — GINSENG 100 MG
CAPSULE ORAL PRN
Status: DISCONTINUED | OUTPATIENT
Start: 2022-09-30 | End: 2022-09-30 | Stop reason: HOSPADM

## 2022-09-30 RX ORDER — SODIUM CHLORIDE 9 MG/ML
25 INJECTION, SOLUTION INTRAVENOUS PRN
Status: DISCONTINUED | OUTPATIENT
Start: 2022-09-30 | End: 2022-10-05 | Stop reason: HOSPADM

## 2022-09-30 RX ORDER — SODIUM CHLORIDE 0.9 % (FLUSH) 0.9 %
5-40 SYRINGE (ML) INJECTION EVERY 12 HOURS SCHEDULED
Status: CANCELLED | OUTPATIENT
Start: 2022-09-30

## 2022-09-30 RX ORDER — DEXAMETHASONE SODIUM PHOSPHATE 4 MG/ML
INJECTION, SOLUTION INTRA-ARTICULAR; INTRALESIONAL; INTRAMUSCULAR; INTRAVENOUS; SOFT TISSUE PRN
Status: DISCONTINUED | OUTPATIENT
Start: 2022-09-30 | End: 2022-09-30 | Stop reason: SDUPTHER

## 2022-09-30 RX ORDER — SODIUM CHLORIDE 0.9 % (FLUSH) 0.9 %
5-40 SYRINGE (ML) INJECTION PRN
Status: DISCONTINUED | OUTPATIENT
Start: 2022-09-30 | End: 2022-10-05 | Stop reason: HOSPADM

## 2022-09-30 RX ORDER — LIDOCAINE HYDROCHLORIDE 20 MG/ML
INJECTION, SOLUTION INTRAVENOUS PRN
Status: DISCONTINUED | OUTPATIENT
Start: 2022-09-30 | End: 2022-09-30 | Stop reason: SDUPTHER

## 2022-09-30 RX ORDER — SODIUM CHLORIDE 9 MG/ML
25 INJECTION, SOLUTION INTRAVENOUS PRN
Status: CANCELLED | OUTPATIENT
Start: 2022-09-30

## 2022-09-30 RX ORDER — FUROSEMIDE 10 MG/ML
40 INJECTION INTRAMUSCULAR; INTRAVENOUS ONCE
Status: COMPLETED | OUTPATIENT
Start: 2022-09-30 | End: 2022-09-30

## 2022-09-30 RX ORDER — OXYCODONE HYDROCHLORIDE 5 MG/1
5 TABLET ORAL PRN
Status: CANCELLED | OUTPATIENT
Start: 2022-09-30 | End: 2022-09-30

## 2022-09-30 RX ORDER — MAGNESIUM HYDROXIDE 1200 MG/15ML
LIQUID ORAL CONTINUOUS PRN
Status: DISCONTINUED | OUTPATIENT
Start: 2022-09-30 | End: 2022-09-30 | Stop reason: HOSPADM

## 2022-09-30 RX ADMIN — LISINOPRIL 20 MG: 20 TABLET ORAL at 08:22

## 2022-09-30 RX ADMIN — DEXTROSE MONOHYDRATE 125 ML: 100 INJECTION, SOLUTION INTRAVENOUS at 11:24

## 2022-09-30 RX ADMIN — LIDOCAINE HYDROCHLORIDE 5 ML: 10 INJECTION, SOLUTION EPIDURAL; INFILTRATION; INTRACAUDAL; PERINEURAL at 11:30

## 2022-09-30 RX ADMIN — INSULIN LISPRO 5 UNITS: 100 INJECTION, SOLUTION INTRAVENOUS; SUBCUTANEOUS at 17:35

## 2022-09-30 RX ADMIN — SODIUM CHLORIDE 25 ML: 9 INJECTION, SOLUTION INTRAVENOUS at 14:54

## 2022-09-30 RX ADMIN — VANCOMYCIN HYDROCHLORIDE 1000 MG: 10 INJECTION, POWDER, LYOPHILIZED, FOR SOLUTION INTRAVENOUS at 11:23

## 2022-09-30 RX ADMIN — ONDANSETRON 130 MG: 2 INJECTION INTRAMUSCULAR; INTRAVENOUS at 12:20

## 2022-09-30 RX ADMIN — LIDOCAINE HYDROCHLORIDE 50 MG: 20 INJECTION, SOLUTION INTRAVENOUS at 12:20

## 2022-09-30 RX ADMIN — OLANZAPINE 5 MG: 5 TABLET, FILM COATED ORAL at 08:22

## 2022-09-30 RX ADMIN — SODIUM CHLORIDE: 9 INJECTION, SOLUTION INTRAVENOUS at 12:15

## 2022-09-30 RX ADMIN — DAPTOMYCIN 500 MG: 500 INJECTION, POWDER, LYOPHILIZED, FOR SOLUTION INTRAVENOUS at 14:54

## 2022-09-30 RX ADMIN — IBUPROFEN 400 MG: 400 TABLET, FILM COATED ORAL at 08:22

## 2022-09-30 RX ADMIN — FUROSEMIDE 40 MG: 10 INJECTION, SOLUTION INTRAMUSCULAR; INTRAVENOUS at 11:15

## 2022-09-30 RX ADMIN — ONDANSETRON 4 MG: 2 INJECTION INTRAMUSCULAR; INTRAVENOUS at 12:24

## 2022-09-30 RX ADMIN — CEFEPIME HYDROCHLORIDE 2000 MG: 2 INJECTION, POWDER, FOR SOLUTION INTRAMUSCULAR; INTRAVENOUS at 02:35

## 2022-09-30 RX ADMIN — OLANZAPINE 5 MG: 5 TABLET, FILM COATED ORAL at 20:30

## 2022-09-30 RX ADMIN — DEXAMETHASONE SODIUM PHOSPHATE 4 MG: 4 INJECTION, SOLUTION INTRAMUSCULAR; INTRAVENOUS at 12:24

## 2022-09-30 RX ADMIN — SODIUM CHLORIDE, PRESERVATIVE FREE 10 ML: 5 INJECTION INTRAVENOUS at 20:31

## 2022-09-30 RX ADMIN — INSULIN GLARGINE 10 UNITS: 100 INJECTION, SOLUTION SUBCUTANEOUS at 17:35

## 2022-09-30 RX ADMIN — QUETIAPINE FUMARATE 25 MG: 25 TABLET ORAL at 20:30

## 2022-09-30 ASSESSMENT — PAIN SCALES - GENERAL
PAINLEVEL_OUTOF10: 0
PAINLEVEL_OUTOF10: 2

## 2022-09-30 NOTE — PROGRESS NOTES
Clinical Pharmacy Progress Note    Vancomycin has been discontinued - Pharmacy will sign off on dosing  If resumed, please re-consult Pharmacy     Please call with questions:  [phone] ([facility])    Nikki Smith. Ольга GAGNON    9/30/2022 12:11 PM

## 2022-09-30 NOTE — CARE COORDINATION
CM  following for  d/c planning:    CM  spoke with pt  daughter  Tawana Villarreal    at  bedside  who was inquiring  about  SNF referrals    -  As they really want patient to be near them in [place], [place].   -  Patient having surgery today:  Friday. ID  Following  :  PICC line  order  placed  . Plan:      -Continue vancomycin and cefepime  -Continue to monitor for signs for wound infection   -OR tomorrow for wound closure. Anticipate need iv antibiotics after discharge given findings at surgery 9/30     OT=14,/24  PT=10/24     The choices they made for SNFs are:     [facility]  [address] [facility], [facility] Sweta Schmidt  Phone:  [phone]:  [phone]  CM  spoke with  Radha Bauer in admissions and  they provide  fax  #  for  review  and  will call CM  back with determination . CALLED to FOLLOW UP AND  UNABLE TO LEAVE VM  MAILBOX FULL      [address], [address]  Phone: [phone]      Fax: [phone]    Referral faxed  :  CM  spoke with [facility] in admissions , they have  SNF  but  currently no LTC  beds  . They will review  and  let  CM  know if they are able to accept  .  will follow up with admissions. CM CALLED AND THEY ARE REVIEWING:  STATED SOMEONE FROM ADMISSIONS  WLL BE REACHING OUT  TO  CM  LATER TODAY >       [facility] [address] [place] [address]  Phone: [phone]   Fax: [phone]    CM spoke with admissions and faxed  clinicals for review    CM  reached out and  faxed  referral to     [address]  Address: [address] [place] [address]  Phone: [phone]  contact :  [phone]    Fax :  [phone]    REFERRAL  FAXED and they are  reviewing  . CM will continue to follow patient until discharge.       Electronically signed by Tiffanie Phan RN on 9/30/2022 at 12:05 PM

## 2022-09-30 NOTE — PROGRESS NOTES
Internal Medicine Progress Note    Patient Name: Danielle Patient   Patient : 1955   Date: 2022   Admit Date: 2022     CC: Foot Pain (Pt reports he injured left foot last week and it was sutured. Today nurse from [facility] noticed swelling and redness around the foot. Pain 8/10)       Interval History: Patient seen and examined at bedside. Fever overnight. Oxygen requirement decreased to 2-3 L, pulmonary sounds are improving. For OR today. Patient denies all complaints, including headache, CP, SOB, bowel or urinary changes. HPI: Danielle Patient is a 74 y.o. M who presents from [address] [place] [address] for worsening of a recent foot injury. Notably, he was seen on 22 in the ER for an open intra-articular fracture thought the base of the first digit on the left. It was treated at bedside with flushing and repair by podiatry at that time, and the patient was given Ancef in the ER and sent home on Keflex. Patient did not follow up with Dr. Cristian Burciaga outpatient as he was directed. Patient is Alert and Orientated x 2 (to person, place, but not year) and states he has trouble figuring out words. He is unable to tell me about his PMHx. His daughters were present when Podiatry saw the patient, and reportedly the family states they are unsure if the patient's facility did dressing changes or administered his antibiotics. Patient is a poor historian and is unable to corrobarate taking meds or not. Patient does endorse fevers. Family noticed redness and drainage from his foot wound. Patient complains of pain, even when not moving his foot. In the ED: Podiatry performed a bedside I&D and obtained wound cultures. ROS:  As per interval history above.       Vital Signs:  Patient Vitals for the past 8 hrs:   BP Temp Temp src Pulse Resp Weight   22 0735 (!) 165/71 (!) 101.8 °F (38.8 °C) Oral (!) 102 20 --   22 0600 -- -- -- -- -- 212 lb 11.9 oz (96.5 kg)   22 0501 (!) 155/75 98.8 °F (37.1 °C) Oral 84 19 --       Physical Exam:  Physical Exam  Eyes:      Extraocular Movements: Extraocular movements intact. Cardiovascular:      Rate and Rhythm: Normal rate and regular rhythm. Pulmonary:      Effort: Pulmonary effort is normal.      Breath sounds: Normal breath sounds. Abdominal:      General: Abdomen is flat. Palpations: Abdomen is soft. Musculoskeletal:         General: Signs of injury present. Cervical back: Normal range of motion. Skin:     General: Skin is warm and dry. Neurological:      Mental Status: He is alert.       Comments: Oriented to person and place, not year        Intake/Output Summary (Last 24 hours) at 9/30/2022 0918  Last data filed at 9/29/2022 2005  Gross per 24 hour   Intake 480 ml   Output 600 ml   Net -120 ml        Medications:   lisinopril  20 mg Oral Daily    insulin lispro  0-8 Units SubCUTAneous TID WC    insulin lispro  0-4 Units SubCUTAneous Nightly    vancomycin  1,000 mg IntraVENous Q12H    insulin glargine  15 Units SubCUTAneous Nightly    insulin lispro  5 Units SubCUTAneous TID WC    OLANZapine  5 mg Oral BID    QUEtiapine  25 mg Oral Nightly    [Held by provider] heparin (porcine)  5,000 Units SubCUTAneous 3 times per day    cefepime  2,000 mg IntraVENous Q12H       dextrose        ibuprofen, ondansetron **OR** ondansetron, polyethylene glycol, acetaminophen **OR** acetaminophen, oxyCODONE **OR** oxyCODONE, glucose, dextrose bolus **OR** dextrose bolus, glucagon (rDNA), dextrose     Labs:  CBC:   Recent Labs     09/28/22  0943 09/29/22  0755 09/30/22  0645   WBC 11.5* 12.8* 13.8*   HGB 8.9* 8.9* 8.6*   HCT 27.5* 27.1* 25.8*    391 420   MCV 88.6 87.8 86.6       Renal:    Recent Labs     09/28/22  0943 09/29/22  0829 09/30/22  0645    138 139   K 3.9 4.0 3.5    106 103   CO2 25 23 25   BUN 14 12 13   CREATININE 1.0 0.8 1.0   GLUCOSE 276* 129* 65*   CALCIUM 8.1* 7.9* 8.6   ANIONGAP 9 9 11       Hepatic: No results for input(s): AST, ALT, BILITOT, BILIDIR, PROT, LABALBU, ALKPHOS in the last 72 hours. Troponin: Invalid input(s): TROPONIN    Lactic acid: Invalid input(s): LACTICACID    BNP: No results for input(s): BNP in the last 72 hours. Pro-BNP:   Recent Labs     09/29/22  0755   PROBNP 1,663*       Lipids: No results for input(s): CHOL, TRIG, HDL, LDLCALC, VLDL in the last 72 hours. ABGs:  No results for input(s): PHART, RKS4YXJ, PO2ART, UNQ5GUJ, BEART, THGBART, L2BRIRFV, WBH4PKF in the last 72 hours. VBGs: No results for input(s): PHVEN, UUU7HAN, PO2VEN in the last 72 hours. INR:   Recent Labs     09/29/22  1931   INR 1.17*     aPTT: No results for input(s): APTT in the last 72 hours. Procalcitonin: No results for input(s): PROCAL in the last 72 hours. CRP:   No results for input(s): CRP in the last 72 hours. ESR: No results for input(s): ESR in the last 72 hours. Radiology:  XR CHEST PORTABLE   Final Result      Significant progression of diffuse interstitial abnormality with new right lower lung and left upper lung alveolar consolidation in comparison to 9/26/2022. There is history of fever and elevated white blood cell count. Differential considerations    include asymmetric pulmonary edema and multifocal pneumonia. Normal heart size. XR CHEST (2 VW)   Final Result      Bilateral peribronchial thickening representing bronchitis or asthma. No acute pulmonary consolidation. CT FOOT LEFT WO CONTRAST   Final Result   Impression: Small locules of soft tissue gas adjacent to the major fracture fragment of the left great toe. Correlation for open fracture versus soft tissue infection recommended. XR FOOT LEFT (MIN 3 VIEWS)   Final Result   Impression: Increased interval displacement of the previously noted fracture fragment of the great toe. No discrete soft tissue gas.             Assessment and Plan:  Kraig Epley is a 75 yo M who presents from facility for worsening of recent foot injury treated here at [facility] ER. L Hallux Infection 2/2 Traumatic Injury  Sepsis  Meets SIRS criteria   Bedside treated in ER on 9/17/22, unknown if complaint with wound dressings and Abx's since then  Patient febrile and hypertensive. ESR 90  -CT Foot: \"Small locules of soft tissue gas adjacent to the major fracture fragment of the left great toe. Correlation for open fracture versus soft tissue infection recommended\"  -Bedside I&D performed 9/26/22 in ER. Plan for OR on 9/27/22  -Vanc and Cefepime  -non-weight bearing for now  -wound culture: MRSA, Corynebacterium striatum  -blood cultures: NGTD  -low-risk patient for intermediate-risk surgery  -PT/INR: 15.0 / 1.18  -EKG: performed  -CXR: \"Bilateral peribronchial thickening representing bronchitis or asthma. No acute pulmonary consolidation. \"  -OR today 9/27/22  -CXR: \"Significant progression of diffuse interstitial abnormality with new right lower lung and left upper lung alveolar consolidation in comparison to 9/26/2022. There is history of fever and elevated white blood cell count. Differential considerations   include asymmetric pulmonary edema and multifocal pneumonia. \"   -fluids stopped, spot dose lasix, Pro-BNP 1,663    -patient appears better after. O2 requirement is less, pulmonary sounds improving   -Surgical path results: \"Portion of bone with acute osteomyelitis and degenerative changes. Surrounding tissue with dense fibrosis and acute, focally necrotizing   inflammation.  \"    Normocytic Anemia  Hx of iron deficiency anemia   Hg 9.6 on admission  -type and screen  -iron studies post-surgery: Iron, TIBC, Transferrin, Ferritin     Hypertension  -home lisinopril re-ordered in hospital.      Diabetes mellitus  -at home insulin  -HgA1c: awaiting new one  -hypoglycemia protocol  -corrective medium-dose algorithm   -Lispro 5 TID  -Lantus 10, with dinner     Unspecified psychotic disorder  -home olanzapine re-ordered  -home quetiapine re-ordered     DVT PPx: Heparin subq  Diet:  Diet NPO Exceptions are: Sips of Water with Meds   Code status:  Full Code   ELOS: >3 nights  Barriers to discharge: I&D  Disposition  - [facility]readmission: [facility]  - Current: [facility]  - Upon discharge: TBA, possibly a different facility.  Referrals made     Will discuss with attending physician Dr. Nahid Rice MD.     Michael Joy MD  Internal Medicine, PGY-1

## 2022-09-30 NOTE — BRIEF OP NOTE
Brief Postoperative Note      Patient: Delmy Mo  YOB: 1955  MRN: [id number]    Date of Procedure: 9/30/2022    Pre-Op Diagnosis: LEFT FOOT GANGRENE    Post-Op Diagnosis: Same       Procedure(s):  LEFT FOOT DEBRIDEMENT INCISION AND DRAINAGE AND DELAYED PRIMARY CLOSURE    Surgeon(s):  Suzy Flores DPM    Assistant:  Resident: Danna Jacob DPM    Anesthesia: Choice    Hemostasis: anatomic dissection    Injectables: pre-op 10cc of 1% lidocaine plain    Materials: 3-0 nylon      Estimated Blood Loss (mL): Minimal    Complications: None    Specimens:   * No specimens in log *    Implants:  * No implants in log *      Drains: * No LDAs found *    Findings: Capillary refill was brisk to left hallux. Adequate bleeding was noted. Some dusky necrotic changed were noted to skin on the medial aspect of the 1st metatarsophalangeal joint that may require local wound care. Decision was made to not amputate toe. Procedure felt definitive     DISPO: S/P left foot incision and drainage with delayed primary closure. Decision was made to not amputate left hallux. Patient will be transferred back to the floor. Procedure felt definitive. Patient will be ready for discharge from a podiatric standpoint pending final ID recs, SNF placement, PT/OT eval, and medical clearance.     Electronically signed by Danna Jacob DPM on 9/30/2022 at 1:03 PM

## 2022-09-30 NOTE — DISCHARGE INSTR - COC
Continuity of Care Form    Patient Name: Renard Deleon   :  1955  MRN:  [id number]    Admit date:  2022  Discharge date:        Code Status Order: Full Code   Advance Directives:     Admitting Physician:  Tyrel Carreno MD  PCP: Teresa Sánchez DO    Discharging Nurse: [facility] Unit/Room#: 5764/7520-80  Discharging Unit Phone Number: ***    Emergency Contact:   Extended Emergency Contact Information  Primary Emergency Contact: [address] Phone: [phone]  Relation: Child    Past Surgical History:  Past Surgical History:   Procedure Laterality Date    FOOT DEBRIDEMENT Left 2022    LEFT FOOT DEBRIDEMENT INCISION AND DRAINAGE, BONE BIOPSY performed by Angie Nash DPM at [address]       Immunization History:   Immunization History   Administered Date(s) Administered    Tdap (Boostrix, Adacel) 2022       Active Problems:  Patient Active Problem List   Diagnosis Code    Wound infection, posttraumatic T14. 8XXA, L08.9    Abscess of left foot L02.612    MRSA infection A49.02    Displaced fracture of proximal phalanx of left great toe, initial encounter for open fracture S92.412B       Isolation/Infection:   Isolation            Contact          Patient Infection Status       Infection Onset Added Last Indicated Last Indicated By Review Planned Expiration Resolved Resolved By    MRSA 22 Culture, Tissue                Nurse Assessment:  Last Vital Signs: BP (!) 165/71   Pulse (!) 102   Temp (!) 101.8 °F (38.8 °C) (Oral)   Resp 20   Ht 6' (1.829 m)   Wt 212 lb 11.9 oz (96.5 kg)   SpO2 93%   BMI 28.85 kg/m²     Last documented pain score (0-10 scale): Pain Level: 2  Last Weight:   Wt Readings from Last 1 Encounters:   22 212 lb 11.9 oz (96.5 kg)     Mental Status:  disoriented    IV Access:  - None    Nursing Mobility/ADLs:  Walking   Dependent  Transfer  Dependent  Bathing  Dependent  Dressing  Dependent  Toileting  Dependent  Feeding Subjective:       Patient ID: Joe Levi is a 27 y.o. male.    Vitals:  height is 6' (1.829 m) and weight is 83.9 kg (185 lb). His temperature is 98.8 °F (37.1 °C). His blood pressure is 111/69 and his pulse is 67. His respiration is 16 and oxygen saturation is 99%.     Chief Complaint: Sinus Problem    Patient actually does not want you it STD testing he actually wants to be treated for gonorrhea because he has had in the past any feels like it does when he had in the past he has been sexually active without protection and thinks that he is at risk and reports that he does not want to do the whole labs thing and just wants to get the medicine shot in the prescription and be done with it .  He states that he has congestion and allergies sometimes and he has pressure and buildup of fluid in his ears and sinuses.  He denies fevers and denies facial pain and denies purulent drainage.  He has not taken anything for symptoms.  He states that in the past Flonase has helped.      Sinus Problem   This is a new problem. Episode onset: two weeks. The problem has been gradually worsening since onset. There has been no fever. He is experiencing no pain. Associated symptoms include sinus pressure. Pertinent negatives include no chills, congestion, coughing, diaphoresis, ear pain, shortness of breath or sore throat. Past treatments include nothing.       Constitution: Negative for chills, sweating, fatigue and fever.   HENT: Positive for sinus pressure. Negative for ear pain, congestion, sinus pain, sore throat and voice change.    Neck: Negative for painful lymph nodes.   Eyes: Negative for eye redness.   Respiratory: Negative for chest tightness, cough, sputum production, bloody sputum, COPD, shortness of breath, stridor, wheezing and asthma.    Gastrointestinal: Negative for nausea and vomiting.   Musculoskeletal: Negative for muscle ache.   Skin: Negative for rash.   Allergic/Immunologic: Negative for seasonal allergies  Dependent  Med Admin  Dependent  Med Delivery   whole    Wound Care Documentation and Therapy:  Incision 09/27/22 Toe (Comment  which one) Anterior; Left (Active)   Dressing Status Clean;Dry; Intact 09/30/22 0501   Dressing/Treatment Ace wrap 09/30/22 0501   Closure Other (Comment) 09/27/22 1640   Incision Assessment Other (Comment) 09/27/22 1640   Drainage Amount None 09/27/22 1640   Odor None 09/27/22 1640   Number of days: 2        Elimination:  Continence: Bowel: Yes  Bladder: Yes  Urinary Catheter: None   Colostomy/Ileostomy/Ileal Conduit: No       Date of Last BM: 10/5/22    Intake/Output Summary (Last 24 hours) at 9/30/2022 0932  Last data filed at 9/29/2022 2005  Gross per 24 hour   Intake 480 ml   Output 600 ml   Net -120 ml     I/O last 3 completed shifts: In: 80 [P.O.:780]  Out: 600 [Urine:600]    Safety Concerns:     Sundowners [place] and At Risk for Falls    Impairments/Disabilities:      Vision    Nutrition Therapy:  Current Nutrition Therapy:   - Oral Diet:  General and Carb Control 3 carbs/meal (1500kcals/day)    Routes of Feeding: Oral  Liquids: No Restrictions  Daily Fluid Restriction: no  Last Modified Barium Swallow with Video (Video Swallowing Test): not done    Treatments at the Time of Hospital Discharge:   Respiratory Treatments: no  Oxygen Therapy:  is not on home oxygen therapy.   Ventilator:    - No ventilator support    Rehab Therapies: Physical Therapy and Occupational Therapy  Weight Bearing Status/Restrictions: Partial weight bearing (30-50%) only on leg left leg  Other Medical Equipment (for information only, NOT a DME order):  serosteady  Other Treatments: no    Patient's personal belongings (please select all that are sent with patient):  None    RN SIGNATURE:  Electronically signed by Nallely Handy RN on 10/5/22 at 1:17 PM EDT    CASE MANAGEMENT/SOCIAL WORK SECTION    Inpatient Status Date: 09/26/2022    Readmission Risk Assessment Score:  Readmission Risk              Risk of and asthma.   Hematologic/Lymphatic: Negative for swollen lymph nodes.       Objective:      Physical Exam   Constitutional: He is oriented to person, place, and time. He appears well-developed and well-nourished. He is cooperative.  Non-toxic appearance. He does not appear ill. No distress.   HENT:   Head: Normocephalic and atraumatic.   Right Ear: Hearing, tympanic membrane, external ear and ear canal normal.   Left Ear: Hearing, tympanic membrane, external ear and ear canal normal.   Nose: No mucosal edema, rhinorrhea or nasal deformity. No epistaxis. Right sinus exhibits no maxillary sinus tenderness and no frontal sinus tenderness. Left sinus exhibits no maxillary sinus tenderness and no frontal sinus tenderness.   Mouth/Throat: Uvula is midline, oropharynx is clear and moist and mucous membranes are normal. No trismus in the jaw. Normal dentition. No uvula swelling. No posterior oropharyngeal erythema.   Cobblestoning noted  Bilateral TMs with minimal serous fluid noted, left slightly worse than right.   Eyes: Conjunctivae and lids are normal. No scleral icterus.   Sclera clear bilat   Neck: Trachea normal, full passive range of motion without pain and phonation normal. Neck supple.   Cardiovascular: Normal rate, regular rhythm, normal heart sounds, intact distal pulses and normal pulses.   Pulmonary/Chest: Effort normal and breath sounds normal. No respiratory distress.   Abdominal: Soft. Normal appearance and bowel sounds are normal. There is no tenderness.   Musculoskeletal: Normal range of motion. He exhibits no edema or deformity.   Neurological: He is alert and oriented to person, place, and time. He exhibits normal muscle tone. Coordination normal.   Skin: Skin is warm, dry and intact. He is not diaphoretic. No pallor.   Psychiatric: He has a normal mood and affect. His speech is normal and behavior is normal. Cognition and memory are normal.   Nursing note and vitals reviewed.      Assessment:       1.  STD exposure    2. Recurrent acute serous otitis media of both ears    3. Urethritis        Plan:         STD exposure    Recurrent acute serous otitis media of both ears    Urethritis    Other orders  -     cefTRIAXone injection 500 mg  -     azithromycin (ZITHROMAX) 500 MG tablet; Take 2 tablets (1,000 mg total) by mouth once. for 1 dose  Dispense: 2 tablet; Refill: 0          Patient Instructions       Rocephin shot 500 mg given in clinic  Azithromycin prescription 1 g 1 time dose   Use Flonase nasal spray twice daily to both nostrils for 7 days  Take Zyrtec or Claritin or Allegra daily for 7 days.  Review allergic rhinitis and fluid in the middle ear sheet and preventative care sheet.  This antibiotic regimen above covers urethritis from gonorrhea and chlamydia.    Earache, No Infection (Adult)  Earaches can happen without an infection. This occurs when air and fluid build up behind the eardrum causing a feeling of fullness and discomfort and reduced hearing. This is called otitis media with effusion (OME) or serous otitis media. It means there is fluid in the middle ear. It is not the same as acute otitis media, which is typically from infection.  OME can happen when you have a cold if congestion blocks the passage that drains the middle ear. This passage is called the eustachian tube. OME may also occur with nasal allergies or after a bacterial middle ear infection.    The pain or discomfort may come and go. You may hear clicking or popping sounds when you chew or swallow. You may feel that your balance is off. Or you may hear ringing in the ear.  It often takes from several weeks up to 3 months for the fluid to clear on its own. Oral pain relievers and ear drops help if there is pain. Decongestants and antihistamines sometimes help. Antibiotics don't help since there is no infection. Your doctor may prescribe a nasal spray to help reduce swelling in the nose and eustachian tube. This can allow the ear to  Unplanned Readmission:  14           Discharging to Facility/ Agency     Sudha Sheridan  Address: [address], [address]  Phone: [phone]        / signature: Electronically signed by Cristobal Rooney RN on 10/4/22 at 4:55 PM EDT    PHYSICIAN SECTION    Prognosis: Good    Condition at Discharge: Stable    Rehab Potential (if transferring to Rehab): Good    Recommended Labs or Other Treatments After Discharge:   -Needed 2L oxygen, but only 0.5 L nasal canula for discharge, as needed.    -On oral antibiotics, not infusion, until 10/26  - Diagnosis - L foot infection  - Disposition / date discharge  - Check CBC w diff, CMP, ESR, CRP, CK every Mon or Tue - FAX result to [phone]  - Call with antibiotic issues, [phone]  - Call with any change in status, transfer in or out of a facility or to hospital - [phone]  - No f/u in outpatient ID office necessary    Dr. Macrina Garcia DPM  [address] [facility]  [phone]    Podiatry Wound Care Discharge Instructions  Please perform everyother day dressing changes to left lower extremity as follows  -Apply adaptic or any nonadherent dressing to the wound on the left big toe   -Next apply Betadine soaked gauze over top of the Adaptic or nonadherent dressing on the left foot  -Next apply gauze to the top of the left foot, ankle, and leg  -Next loosely wrap the left lower extremity with Kerlix starting from just in front of the toes and ending just below the knee  -Next gently wrap the left foot with 4 inch Ace bandage starting from just in front of the toes and ending just above the ankle  -Next gently wrap the left leg with 6 inch Ace bandage starting from just above the ankle and ending just below the knee  Patient is partial heel weightbearing Left lower extremity  Please follow-up with Dr. Dr. Macrina Garcia DPM for first postoperative visit on 10/7/2022 if discharged prior to drain.  If your OME doesn't improve after 3 months, surgery may be used to drain the fluid and insert a small tube in the eardrum to allow continued drainage.  Because the middle ear fluid can become infected, it is important to watch for signs of an ear infection which may develop later. These signs include increased ear pain, fever, or drainage from the ear.  Home care  The following guidelines will help you care for yourself at home:  · You may use over-the-counter medicine as directed to control pain, unless another medicine was prescribed. If you have chronic liver or kidney disease or ever had a stomach ulcer or GI bleeding, talk with your doctor before using these medicines. Aspirin should never be used in anyone under 18 years of age who is ill with a fever. It may cause severe liver damage.  · You may use over-the-counter decongestants such as phenylephrine or pseudoephedrine. But they are not always helpful. Don't use nasal spray decongestants more than 3 days. Longer use can make congestion worse. Prescription nasal sprays from your doctor don't typically have those restrictions.  · Antihistamines may help if you are also having allergy symptoms.  · You may use medicines such as guaifenesin to thin mucus and promote drainage.  Follow-up care  Follow up with your healthcare provider or as advised if you are not feeling better after 3 days.  When to seek medical advice  Call your healthcare provider right away if any of the following occur:  · Your ear pain gets worse or does not start to improve   · Fever of 100.4°F (38°C) or higher, or as directed by your healthcare provider  · Fluid or blood draining from the ear  · Headache or sinus pain  · Stiff neck  · Unusual drowsiness or confusion  Date Last Reviewed: 10/1/2016  © 9913-7263 [employer]. [address] [place] [address], [place] [address]. All rights reserved. This information is not intended as a substitute for professional medical care. Always  then    Physician Certification: I certify the above information and transfer of Naman Pedersen  is necessary for the continuing treatment of the diagnosis listed and that he requires Yaya Uvaldo for less 30 days.      Update Admission H&P: No change in H&P    PHYSICIAN SIGNATURE:  Electronically signed by Kristian Davenport MD on 10/4/22 at 5:23 PM EDT follow your healthcare professional's instructions.        Prevention Guidelines, Men Ages 18 to 39  Screening tests and vaccines are an important part of managing your health. Health counseling is essential, too. Below are guidelines for these, for men ages 18 to 39. Talk with your healthcare provider to make sure youre up-to-date on what you need.  Screening Who needs it How often   Alcohol misuse All men in this age group At routine exams   Blood pressure All men in this age group Every 2 years if your blood pressure is less than 120/80 mm Hg; yearly if your systolic blood pressure is 120 to 139 mm Hg, or your diastolic blood pressure reading is 80 to 89 mm Hg   Depression All men in this age group At routine exams   Diabetes mellitus, type 2 Adults who have no symptoms but are overweight or obese and have 1 or more other risk factors for diabetes At least every 3 years (yearly if blood sugar has already started to rise)   Hepatitis C If at increased risk At routine exams   High cholesterol or triglycerides All men ages 35 and older, and younger men at high risk for coronary artery disease At least every 5 years   HIV All men At routine exams   Obesity All men in this age group At routine exams   Syphilis Men at increased risk for infection - talk with your healthcare provider At routine exams   Tuberculosis Men at increased risk for infection - talk with your healthcare provider Check with your healthcare provider   Vision All men in this age group Every 5 to 10 years if no risk factors for eye disease   Vaccines Who needs it How often   Chickenpox (varicella) All men in this age group who have no record of this infection or vaccine 2 doses; the second dose should be given at least 4 weeks after the first dose   Hepatitis A Men at increased risk for infection - talk with your healthcare provider 2 doses given at least 6 months apart   Hepatitis B Men at increased risk for infection - talk with your healthcare  provider 3 doses over 6 months; second dose should be given 1 month after the first dose; the third dose should be given at least 2 months after the second dose and at least 4 months after the first dose   Haemophilus influenzae Type B (HIB) Men at increased risk for infection - talk with your healthcare provider 1 to 3 doses   Human papillomavirus (HPV) All men in this age group up to age 26 3 doses; the second dose should be given 1 to 2 months after the first dose and the third dose given 6 months after the first dose   Influenza (flu) All men in this age group Once a year   Measles, mumps, rubella (MMR) All men in this age group who have no record of these infections or vaccines 1 or 2 doses through age 55   Meningococcal Men at increased risk for infection - talk with your healthcare provider 1 or more doses   Pneumococca (PCV13) and Pneumococcal (PPSV23) Men at increased risk for infection - talk with your healthcare provider PCV13: 1 dose ages 19 to 65 (protects against 13 types of pneumococcal bacteria)  PPSV23: 1 to 2 doses through age 64, or 1 dose at 65 or older (protects against 23 types of pneumococcal bacteria)   Tetanus/diphtheria/pertussis (Td/Tdap) booster All men in this age group A one-time Tdap booster after age 18, then Td every10 years   Counseling Who needs it How often   Diet and exercise Overweight or obese people When diagnosed, and then at routine exams   Use of tobacco and the health effects it can cause All men in this age group Every visit   Sexually transmitted infection prevention Men who are sexually active At routine exams   Skin cancer Prevention of skin cancer in fair-skinned adults through age 24 At routine exams   1Those who are 18 years of age, who are not up-to-date on their childhood immunizations, should receive all appropriate catch-up vaccines recommended by the CDC.  Date Last Reviewed: 2/1/2017  © 7977-6362 [employer]. [address] [place] [address], [place]  [address]. All rights reserved. This information is not intended as a substitute for professional medical care. Always follow your healthcare professional's instructions.

## 2022-09-30 NOTE — PROGRESS NOTES
Patient returned from OR. Aox 1, VS stable, Call light within reach, bed alarm on, locked, and low.  Family at bedside

## 2022-09-30 NOTE — PROGRESS NOTES
Spoke with Resident about family concerns of picc placement.  Family is okay to wait until Monday to speak team regarding picc

## 2022-09-30 NOTE — ANESTHESIA POSTPROCEDURE EVALUATION
Department of Anesthesiology  Postprocedure Note    Patient: Niki Horton  MRN: [id number]  YOB: 1955  Date of evaluation: 9/30/2022      Procedure Summary     Date: 09/30/22 Room / Location: [address] [place] [address]    Anesthesia Start: 3035 Anesthesia Stop: 5651    Procedure: LEFT FOOT DEBRIDEMENT INCISION AND DRAINAGE AND DELAYED PRIMARY CLOSURE (Left) Diagnosis:       Gangrene of left foot ([address]      (LEFT FOOT GANGRENE)    Surgeons: Edgar Choe DPM Responsible Provider: Zully Newton MD    Anesthesia Type: general, MAC ASA Status: 3          Anesthesia Type: No value filed.     Trinity Phase I: Trinity Score: 10    Trinity Phase II:        Anesthesia Post Evaluation    Patient location during evaluation: PACU  Patient participation: complete - patient participated  Level of consciousness: awake and alert  Pain score: 0  Airway patency: patent  Nausea & Vomiting: no nausea and no vomiting  Complications: no  Cardiovascular status: hemodynamically stable  Respiratory status: acceptable  Hydration status: stable

## 2022-09-30 NOTE — PROGRESS NOTES
Encounters:   09/30/22 212 lb 11.9 oz (96.5 kg)     Current & Prior Antimicrobial Regimen(s):  Cefepime 2000mg EI q12h (9/26-current)  Vancomycin - Pharmacy to dose   2000mg IV x1 9/26 14:30   750mg IV q12h (9/27-9/28)   1000mg IV q12h (9/28-current)    Vancomycin Level(s) / Doses:    Date Time Dose Level / Type of Level Interpretation   9/28 09:43 750mg q12h Random = 14.9 mcg/mL Drawn ~5 hr after 4th total dose  Calculated AUC = 375  Increase to 1000mg q12h   9/30 06:00 1000mg q12h Random = 17.7 mg/L AUC- 489, ss Tr 15.9 (SCr 0.8--> 1.0). Continue same   Note: Serum levels collected for AUC-based dosing may be high if collected in close proximity to the dose administered. This is not necessarily indicative of toxicity. Cultures & Sensitivities:    Date Site Micro Susceptibility / Result   9/26 Blood x2 No growth to date    9/26 Wound MRSA  Corynebacterium    Vanc SHAYE = 2  No further w/u      9/27 Surgical tissue MRSA  E. faecalis Vanc SHAYE =2  -pending     Recent Labs     09/28/22  0943 09/29/22  0755 09/29/22  0829 09/30/22  0645   CREATININE 1.0  --  0.8 1.0   BUN 14  --  12 13   WBC 11.5* 12.8*  --  13.8*         Estimated Creatinine Clearance: 89 mL/min (based on SCr of 1 mg/dL). Additional Lab Values / Findings of Note:    No results for input(s): PROCAL in the last 72 hours.

## 2022-09-30 NOTE — PROGRESS NOTES
PACU Transfer Note    Vitals:    09/30/22 1400   BP: (!) 166/73   Pulse: 82   Resp: 16   Temp:    SpO2: 92%       In: 600 [I.V.:600]  Out: 20     Pain assessment:  none  Pain Level: 0    Report given to Receiving unit RN.    9/30/2022 2:12 PM

## 2022-09-30 NOTE — PROGRESS NOTES
ID Follow-up NOTE    CC:   L Traumatic foot infection / osteomyelitis  Antibiotics: Vancomycin, Cefepime    Admit Date: 9/26/2022  Hospital Day: 5    Subjective:     Going to OR today  POD#3 I&D, purulence in 1st intermetatarsal space    No complains. Denies L foot pain, fevers / chills. Objective:     Tm 101.8     Patient Vitals for the past 8 hrs:   BP Temp Temp src Pulse Resp SpO2 Weight   09/30/22 1111 132/64 99.2 °F (37.3 °C) Oral (!) 107 20 93 % --   09/30/22 0735 (!) 165/71 (!) 101.8 °F (38.8 °C) Oral (!) 102 20 -- --   09/30/22 0600 -- -- -- -- -- -- 212 lb 11.9 oz (96.5 kg)   09/30/22 0501 (!) 155/75 98.8 °F (37.1 °C) Oral 84 19 -- --       I/O last 3 completed shifts: In: 780 [P.O.:780]  Out: 600 [Urine:600]  No intake/output data recorded. EXAM:  GENERAL: No apparent distress.   3L  HEENT: Membranes moist, no oral lesion  NECK:  Supple, no lymphadenopathy  LUNGS: Clear b/l, no rales, no dullness  CARDIAC: RRR, no murmur appreciated  ABD:  + BS, soft / NT  EXT:  No rash, no edema, no lesions  L foot with dressing, dry  NEURO: No focal neurologic findings  PSYCH: Sensorium, mood normal  LINES:  Peripheral IV      Data Review:  Lab Results   Component Value Date    WBC 13.8 (H) 09/30/2022    HGB 8.6 (L) 09/30/2022    HCT 25.8 (L) 09/30/2022    MCV 86.6 09/30/2022     09/30/2022     Lab Results   Component Value Date    CREATININE 1.0 09/30/2022    BUN 13 09/30/2022     09/30/2022    K 3.5 09/30/2022     09/30/2022    CO2 25 09/30/2022       Hepatic Function Panel:   Lab Results   Component Value Date/Time    ALKPHOS 99 09/26/2022 01:42 PM    ALT 17 09/26/2022 01:42 PM    AST 17 09/26/2022 01:42 PM    PROT 7.5 09/26/2022 01:42 PM    BILITOT <0.2 09/26/2022 01:42 PM    LABALBU 3.6 09/26/2022 01:42 PM       MICRO:  9/26 Wound culture - MRSA, C striatum  Staph aureus / MRSA  Antibiotic Interpretation Microscan    ceFAZolin Resistant >16 mcg/mL   clindamycin Resistant >4 mcg/mL erythromycin Resistant >4 mcg/mL   oxacillin Resistant >2 mcg/mL   tetracycline Resistant >8 mcg/mL   trimethoprim-sulfamethoxazole Resistant >2/38 mcg/mL   vancomycin Sensitive 2 mcg/mL   Dapto SHAYE 1, Linezolid SHAYE 2 - both sensitive     BC - no growth to date     Surg / tissue cult: light MRSA, rare E faecalis    IMAGIN/27 CT foot Small locules of soft tissue gas adjacent to the major fracture fragment of the left great toe. Correlation for open fracture versus soft tissue infection recommended    Scheduled Meds:   lidocaine 1 % injection  5 mL IntraDERmal Once    sodium chloride flush  5-40 mL IntraVENous 2 times per day    insulin glargine  10 Units SubCUTAneous Dinner    lisinopril  20 mg Oral Daily    insulin lispro  0-8 Units SubCUTAneous TID WC    insulin lispro  0-4 Units SubCUTAneous Nightly    vancomycin  1,000 mg IntraVENous Q12H    insulin lispro  5 Units SubCUTAneous TID WC    OLANZapine  5 mg Oral BID    QUEtiapine  25 mg Oral Nightly    [Held by provider] heparin (porcine)  5,000 Units SubCUTAneous 3 times per day    cefepime  2,000 mg IntraVENous Q12H       Continuous Infusions:   sodium chloride      dextrose         PRN Meds:  sodium chloride flush, sodium chloride, ibuprofen, ondansetron **OR** ondansetron, polyethylene glycol, acetaminophen **OR** acetaminophen, oxyCODONE **OR** oxyCODONE, glucose, dextrose bolus **OR** dextrose bolus, glucagon (rDNA), dextrose      Assessment:     73 yo man with hx DM, HTN, psych d/o  Lives in [facility]     Pt sustained L hallux injury (running across the road), sustained open fx. Seen in [facility] ED , wound sutured, given po keflex. He developed swelling, pain, drainage      Presented to ED on  - T 101.2, Cr 13.5  X-ray - displaced fracture L hallux  CT - ST gas adjacent to hallux fracture fragment  Wound cult sent - GS 3+GPC.   Cult MRSA, C striatum  Admit,, started on Vancomycin + Cefepime  Seen by Podiatry, plan for I & D 9/27 - afeb, WBC 12.5. OR resection L hallux, noted to have fluctuance in plantar and dorsal aspect of 1st MT head  L foot with dressing   OR cult S aureus, E faecalis     9/28 Tm 101.1, WBC 11.5    9/29 Tm 102.5. WBC 12.8     IMP/  L hallux open fracture with infection   - x-ray with gas   - wound cult - MRSA (vanco SHAYE 2), C striatum   - OR purulence    Fever - poss due to R foot  Leukocytosis - WBC up    Plan:     Start Daptomycin  D/c  Vancomycin + Cefepime  Will f/u on BC (no growth to date), OR cultures    Wound care and return to OR per Podiatry - OR today 9/30  Await findings     Anticipate need iv antibiotics after discharge given findings at surgery 9/27     Medical Decision Making:   The following items were considered in medical decision making:  Discussion of patient care with other providers  Review / order clinical lab tests  Review / order radiology tests  Review / order other diagnostic tests/interventions  Microbiology cultures and other micro tests reviewed       Discussed with pt, Podiatry Resident  Bevely Meigs, MD

## 2022-09-30 NOTE — DISCHARGE INSTRUCTIONS
Dr. Chasity Moore DPM  [address] [facility]  [phone]    Podiatry Wound Care Discharge Instructions  Please perform everyother day dressing changes to left lower extremity as follows  -Apply adaptic or any nonadherent dressing to the wound on the left big toe   -Next apply Betadine soaked gauze over top of the Adaptic or nonadherent dressing on the left foot  -Next apply gauze to the top of the left foot, ankle, and leg  -Next loosely wrap the left lower extremity with Kerlix starting from just in front of the toes and ending just below the knee  -Next gently wrap the left foot with 4 inch Ace bandage starting from just in front of the toes and ending just above the ankle  -Next gently wrap the left leg with 6 inch Ace bandage starting from just above the ankle and ending just below the knee  Patient is partial heel weightbearing Left lower extremity  Please follow-up with Dr. Dr. Chasity Moore DPM for first postoperative visit on 10/7/2022 if discharged prior to then      Caring for Your Peripherally Inserted Central Catheter (PICC)      You are going home with a peripherally inserted catheter (PICC). This small, soft tube has been placed in a vein in your arm. It is often used when treatment requires medications or nutrition for weeks or months. At home, you need to take care of your PICC to keep it working. And since a PICC line has such a high infection risk, you must take extra care washing your hands and preventing the spread of germs. This sheet will help you remember what to do to care for your PICC at home. Understanding Your Role  . A nurse or other healthcare provider will teach you and your caregivers how to care for the PICC. Before leaving the hospital, make sure that you understand what to do at home, how long you may need the PICC, and when to have a follow up visit. . You will likely be told to flush the PICC with saline or heparin solution.  You may also be told to change the catheters injection caps and change the dressing (Bandage). Or, a nurse may do this for you during a follow-up visit. Only do these things if you are told to, following the instructions you were given. Protecting the PICC  If the PICC gets damaged, it wont work right and could raise you chance of infection. Call your healthcare team right away if any damage occurs. To protect your PICC at home:  . Prevent infection. Use good hand hygiene by following the guidelines on this sheet. Dont touch the catheter or the dressing unless you need to. Always clean your hands before and after you come in contact with any part of the PICC. Your caregivers, family members, any visitors should use good hand hygiene also. Harden Beech Keep the PICC dry. The catheter and dressing must stay dry. Dont take baths, go swimming, use a hot tub, or do other activities that could get the PICC wet. When showering, cover the area with plastic wrap or another cover as recommended by your healthcare provider. Keep the area out of the water spray. If the dressing gets wet, change it only if you have been shown how. Otherwise, call your healthcare team right away. . Avoid damage. Dont use any sharp or pointy objects around the catheter. This includes scissors, pins, knives, razors, or anything else that could puncture or cut it. Also, dont let anything pull or rub on the catheter, such as clothing. . Watch for signs of problems. Pay attention to how much of the catheter that sticks out from your skin. If this changes at all, let your healthcare provider know. Also watch for cracks, leaks, or other damage. If the dressing becomes dirty, loose, or wet, change it (if you have been instructed to) or call your healthcare team.  . Avoid lowering your chest below your waist. This included bending at the waist for actions like tying your shoes.  When your chest is positioned below your waist, especially for a long time, the catheters internal tip could slip out of place in the vein. . Tell your healthcare team if you vomit or have severe coughing. This can make the catheter slip out of place. Protecting Your Arm  The arm with the PICC is at risk for developing blood clots (thrombosis). This is a serious complication. To help prevent it: Brock Felix As much as possible, use the arm with the PICC in it for normal daily activities. Lack of movement can lead to blood clots, so its important to move your arm as you normally would. . Avoid activities or exercises that require major use of your arm, such as sports, unless your healthcare provider says its okay. Brock Felix Avoid activities that cause discomfort in your arm. Talk to your healthcare team if you have concerns about pain or range of motion. . Dont lift anything heavier than 10 pounds with the affected arm. . Drink plenty of water. Staying hydrated helps keep blood clots from forming. Prevent Infection with Good Hand Hygiene  A PICC can let germs into your body. This can lead to serious and sometimes deadly infections. To prevent infection, its very important that you, your caregivers and others around you use good hand hygiene. This means washing your hands well with soap and water, and cleaning them with alcohol based hand gel as directed. Never touch the PICC or dressing without first using one of the methods. To wash your hands with soap and water:  . Wet your hands with warm water. (Avoid hot water, which can cause skin irritation when you wash your hands often). . Apply enough soap to cover the entire surface of your hands, including fingers. . Rub your hands together vigorously for at least 15 seconds. Make sure to rub the front and back of each hand up to the wrist, your fingers and fingernails, between the fingers, and each thumb. . Rinse your hands with warm water  . Dry your hands completely with a new paper towel. Dont use a cloth towel or other reusable towel.  These can harbor germs.  . Use the paper towel to turn off the faucet, then throw it away. If you are in a bathroom, also use a paper towel to open the door instead of touching the handle. When you dont have access to soap and water: Use alcohol-based hand gel. The gel should have at least 60% alcohol. Follow the instructions on the package. Your healthcare team can answer any questions you have about when to use hand gel, or when its better to wash with soap and water. When to Call Your Healthcare Provider  Call your provider right away if you have any of the following:  . Pain or burning in your shoulder, chest, back, arm, or leg  . Fever of 100.4 F or higher  . Chills  . Signs of infection at the catheter site (pain, redness, drainage, burning, or stinging  . Coughing, wheezing, or shortness of breath  . A racing or irregular heartbeat  . Muscle stiffness or trouble moving  . Tightness in your arm, above the catheter site  . Gurgling noises coming from the catheter  . The catheter falls out, breaks, cracks, leaks, or has other damage       References:  Basic.no. org/articles/healthsheets/2584           Internal Medicine  Please continue to take your antibiotics as directed. Take the complete course of antibiotics, even if you feel better. You may resume your home medications as directed. We increased your Lisinopril dose for better blood pressure control. If you feel worse, or have any questions please do not hesitate to contact us or your primary care physician. If you become acutely ill, please go to the Emergency department.

## 2022-09-30 NOTE — ANESTHESIA PRE PROCEDURE
Department of Anesthesiology  Preprocedure Note       Name:  Chaya Allan   Age:  74 y.o.  :  1955                                          MRN:  [id number]         Date:  2022      Surgeon: Lonna Frankel):  Ramone Fisher DPM    Procedure: Procedure(s):  LEFT FOOT DEBRIDEMENT INCISION AND DRAINAGE WITH POSSIBLE HALLUX AMPUTATION, POSSIBLE DELAYED PRIMARY CLOSURE    Medications prior to admission:   Prior to Admission medications    Medication Sig Start Date End Date Taking? Authorizing Provider   acetaminophen (TYLENOL) 325 MG tablet Take 650 mg by mouth every 8 hours as needed for Pain    Historical Provider, MD   ferrous sulfate (IRON 325) 325 (65 Fe) MG tablet Take 325 mg by mouth daily (with breakfast)    Historical Provider, MD   folic acid (FOLVITE) 1 MG tablet Take 1 mg by mouth daily    Historical Provider, MD   insulin glargine (LANTUS) 100 UNIT/ML injection vial Inject 13 Units into the skin nightly    Historical Provider, MD   lisinopril (PRINIVIL;ZESTRIL) 20 MG tablet Take 20 mg by mouth daily    Historical Provider, MD   melatonin 3 MG TABS tablet Take 6 mg by mouth at bedtime    Historical Provider, MD   metFORMIN (GLUCOPHAGE) 500 MG tablet Take 500 mg by mouth 2 times daily (with meals)    Historical Provider, MD   OLANZapine (ZYPREXA) 5 MG tablet Take 5 mg by mouth in the morning and at bedtime    Historical Provider, MD   QUEtiapine (SEROQUEL) 25 MG tablet Take 25 mg by mouth at bedtime    Historical Provider, MD   Ergocalciferol (VITAMIN D2) 50 MCG ( UT) TABS Take 3 tablets by mouth daily    Historical Provider, MD       Current medications:    No current facility-administered medications for this visit. No current outpatient medications on file.      Facility-Administered Medications Ordered in Other Visits   Medication Dose Route Frequency Provider Last Rate Last Admin    lidocaine PF 1 % injection 5 mL  5 mL IntraDERmal Once Wharton Ask, MD        sodium chloride flush 0.9 % injection 5-40 mL  5-40 mL IntraVENous 2 times per day Juan Carlos Brooks MD        sodium chloride flush 0.9 % injection 5-40 mL  5-40 mL IntraVENous PRN Juan Carlos Brooks MD        0.9 % sodium chloride infusion  25 mL IntraVENous PRN Juan Carlos Brooks MD        insulin glargine (LANTUS;BASAGLAR) injection pen 10 Units  10 Units SubCUTAneous Dinner Geri Lewis MD        lisinopril (PRINIVIL;ZESTRIL) tablet 20 mg  20 mg Oral Daily Osmar Brine, DO   20 mg at 09/30/22 8843    insulin lispro (1 Unit Dial) 0-8 Units  0-8 Units SubCUTAneous TID  Geri Lewis MD   4 Units at 09/28/22 0954    insulin lispro (1 Unit Dial) 0-4 Units  0-4 Units SubCUTAneous Nightly Geri Lewis MD        vancomycin (VANCOCIN) 1,000 mg in dextrose 5 % 250 mL IVPB  1,000 mg IntraVENous Q12H Geri Lewis  mL/hr at 09/30/22 1123 1,000 mg at 09/30/22 1123    insulin lispro (1 Unit Dial) 5 Units  5 Units SubCUTAneous TID  Tessa Dyer MD   5 Units at 09/29/22 1720    ibuprofen (ADVIL;MOTRIN) tablet 400 mg  400 mg Oral Q6H PRN Jose Manuel Mata MD   400 mg at 09/30/22 0275    ondansetron (ZOFRAN-ODT) disintegrating tablet 4 mg  4 mg Oral Q8H PRN Jhon Harman DPM        Or    ondansetron (ZOFRAN) injection 4 mg  4 mg IntraVENous Q6H PRN Jhon Harman DPM        polyethylene glycol (GLYCOLAX) packet 17 g  17 g Oral Daily PRN Jhon Harman DPM        acetaminophen (TYLENOL) tablet 650 mg  650 mg Oral Q6H PRN Jhon Harman DPM   650 mg at 09/28/22 2043    Or    acetaminophen (TYLENOL) suppository 650 mg  650 mg Rectal Q6H PRN Jhon Harman DPM        OLANZapine (ZYPREXA) tablet 5 mg  5 mg Oral BID Jhon Harman DPM   5 mg at 09/30/22 4198    QUEtiapine (SEROQUEL) tablet 25 mg  25 mg Oral Nightly Jhon Harman DPM   25 mg at 09/29/22 2011    [Held by provider] heparin (porcine) injection 5,000 Units  5,000 Units SubCUTAneous 3 times per day Jhon Harman DPM   5,000 Units at 09/29/22 2011    oxyCODONE (ROXICODONE) immediate release tablet 5 mg  5 mg Oral Q4H PRN Midge Peper, DPM   5 mg at 09/28/22 2043    Or    oxyCODONE (ROXICODONE) immediate release tablet 10 mg  10 mg Oral Q4H PRN Midge Peper, DPM   10 mg at 09/29/22 1244    cefepime (MAXIPIME) 2000 mg IVPB minibag  2,000 mg IntraVENous Q12H Midge Peper, DPM   Stopped at 09/30/22 0719    glucose chewable tablet 16 g  4 tablet Oral PRN Midge Peper, DPM        dextrose bolus 10% 125 mL  125 mL IntraVENous PRN Midge Peper, .5 mL/hr at 09/30/22 1124 125 mL at 09/30/22 1124    Or    dextrose bolus 10% 250 mL  250 mL IntraVENous PRN Midge Peper, DPM        glucagon (rDNA) injection 1 mg  1 mg SubCUTAneous PRN Midge Peper, DPM        dextrose 10 % infusion   IntraVENous Continuous PRN Midge Peper, DPM           Allergies:  No Known Allergies    Problem List:    Patient Active Problem List   Diagnosis Code    Wound infection, posttraumatic T14. 8XXA, L08.9    Abscess of left foot L02.612    MRSA infection A49.02    Displaced fracture of proximal phalanx of left great toe, initial encounter for open fracture S92.079F       Past Medical History:        Diagnosis Date    Altered mental status     Diabetes ([facility] [address]     Hypertension     Iron deficiency anemia     Psychotic disorder with hallucinations ([facility] [address]        Past Surgical History:        Procedure Laterality Date    FOOT DEBRIDEMENT Left 9/27/2022    LEFT FOOT DEBRIDEMENT INCISION AND DRAINAGE, BONE BIOPSY performed by Cindy Ayoub DPM at [address]  History:    Social History     Tobacco Use    Smoking status: Never    Smokeless tobacco: Never   Substance Use Topics    Alcohol use: Not Currently                                Counseling given: Not Answered      Vital Signs (Current): There were no vitals filed for this visit.                                            BP Readings from Last 3 Encounters:   09/30/22 132/64   09/17/22 (!) 159/77 NPO Status:                                                                                 BMI:   Wt Readings from Last 3 Encounters:   09/30/22 212 lb 11.9 oz (96.5 kg)     There is no height or weight on file to calculate BMI.    CBC:   Lab Results   Component Value Date/Time    WBC 13.8 09/30/2022 06:45 AM    RBC 2.98 09/30/2022 06:45 AM    HGB 8.6 09/30/2022 06:45 AM    HCT 25.8 09/30/2022 06:45 AM    MCV 86.6 09/30/2022 06:45 AM    RDW 14.5 09/30/2022 06:45 AM     09/30/2022 06:45 AM       CMP:   Lab Results   Component Value Date/Time     09/30/2022 06:45 AM    K 3.5 09/30/2022 06:45 AM    K 4.1 09/26/2022 01:42 PM     09/30/2022 06:45 AM    CO2 25 09/30/2022 06:45 AM    BUN 13 09/30/2022 06:45 AM    CREATININE 1.0 09/30/2022 06:45 AM    GFRAA >60 09/30/2022 06:45 AM    AGRATIO 0.9 09/26/2022 01:42 PM    LABGLOM >60 09/30/2022 06:45 AM    GLUCOSE 65 09/30/2022 06:45 AM    PROT 7.5 09/26/2022 01:42 PM    CALCIUM 8.6 09/30/2022 06:45 AM    BILITOT <0.2 09/26/2022 01:42 PM    ALKPHOS 99 09/26/2022 01:42 PM    AST 17 09/26/2022 01:42 PM    ALT 17 09/26/2022 01:42 PM       POC Tests:   Recent Labs     09/30/22  1100   POCGLU 58*       Coags:   Lab Results   Component Value Date/Time    PROTIME 14.8 09/29/2022 07:31 PM    INR 1.17 09/29/2022 07:31 PM       HCG (If Applicable): No results found for: PREGTESTUR, PREGSERUM, HCG, HCGQUANT     ABGs: No results found for: PHART, PO2ART, WVE9XDH, HEO2AHV, BEART, D7HMZHHM     Type & Screen (If Applicable):  No results found for: LABABO, LABRH    Drug/Infectious Status (If Applicable):  No results found for: HIV, HEPCAB    COVID-19 Screening (If Applicable): No results found for: COVID19        Anesthesia Evaluation  Patient summary reviewed and Nursing notes reviewed no history of anesthetic complications:   Airway: Mallampati: II  TM distance: >3 FB   Neck ROM: full  Mouth opening: > = 3 FB   Dental: normal exam         Pulmonary:normal exam Cardiovascular:  Exercise tolerance: no interval change,   (+) hypertension:,     (-)  angina, orthopnea and PND      Rhythm: regular  Rate: normal                    Neuro/Psych:   (+) psychiatric history:            GI/Hepatic/Renal:             Endo/Other:    (+) DiabetesType II DM, , .                 Abdominal:         (-) obese Abdomen: soft. Vascular: Other Findings:             Anesthesia Plan      general and MAC     ASA 3       Induction: intravenous. MIPS: Postoperative opioids intended and Prophylactic antiemetics administered. Anesthetic plan and risks discussed with patient and healthcare power of . Plan discussed with CRNA and attending.                     Syl Gutierrez MD   9/30/2022

## 2022-09-30 NOTE — PROGRESS NOTES
Arrived at bedside for PICC placement. Family at bedside, unaware of plan for antibiotics/picc line. Would like to wait until Monday for placement after talking with podiatry and ID. Will continue to follow. Updated [facility] CARVAJAL.

## 2022-09-30 NOTE — PROGRESS NOTES
Physical Therapy  HOLD  Pt off floor in surgery. Will follow up per POC.   Oralia Castañeda, [address] [facility]

## 2022-09-30 NOTE — PLAN OF CARE
Problem: Infection - Adult  Goal: Absence of infection during hospitalization  Outcome: Progressing   Patient has no drainage dressing since surgery. Patients dressing remains clean, dry and intact. Problem: Pain  Goal: Verbalizes/displays adequate comfort level or baseline comfort level  Outcome: Progressing   Patient has shown any signs of pain. Patient has not verbalized any pain since coming back from surgery.  Will continue to monitor

## 2022-10-01 LAB
ANAEROBIC CULTURE: ABNORMAL
ANION GAP SERPL CALCULATED.3IONS-SCNC: 13 MMOL/L (ref 3–16)
BASOPHILS ABSOLUTE: 0.1 K/UL (ref 0–0.2)
BASOPHILS RELATIVE PERCENT: 0.4 %
BUN BLDV-MCNC: 19 MG/DL (ref 7–20)
CALCIUM SERPL-MCNC: 8.4 MG/DL (ref 8.3–10.6)
CHLORIDE BLD-SCNC: 102 MMOL/L (ref 99–110)
CO2: 25 MMOL/L (ref 21–32)
CREAT SERPL-MCNC: 1 MG/DL (ref 0.8–1.3)
EOSINOPHILS ABSOLUTE: 0.2 K/UL (ref 0–0.6)
EOSINOPHILS RELATIVE PERCENT: 1.2 %
GFR AFRICAN AMERICAN: >60
GFR NON-AFRICAN AMERICAN: >60
GLUCOSE BLD-MCNC: 170 MG/DL (ref 70–99)
GLUCOSE BLD-MCNC: 221 MG/DL (ref 70–99)
GLUCOSE BLD-MCNC: 232 MG/DL (ref 70–99)
GLUCOSE BLD-MCNC: 252 MG/DL (ref 70–99)
GLUCOSE BLD-MCNC: 345 MG/DL (ref 70–99)
GRAM STAIN RESULT: ABNORMAL
HCT VFR BLD CALC: 24 % (ref 40.5–52.5)
HEMOGLOBIN: 7.9 G/DL (ref 13.5–17.5)
LYMPHOCYTES ABSOLUTE: 1.5 K/UL (ref 1–5.1)
LYMPHOCYTES RELATIVE PERCENT: 10.6 %
MCH RBC QN AUTO: 28.7 PG (ref 26–34)
MCHC RBC AUTO-ENTMCNC: 33 G/DL (ref 31–36)
MCV RBC AUTO: 87.1 FL (ref 80–100)
MONOCYTES ABSOLUTE: 1.1 K/UL (ref 0–1.3)
MONOCYTES RELATIVE PERCENT: 7.7 %
NEUTROPHILS ABSOLUTE: 11.6 K/UL (ref 1.7–7.7)
NEUTROPHILS RELATIVE PERCENT: 80.1 %
ORGANISM: ABNORMAL
ORGANISM: ABNORMAL
PDW BLD-RTO: 14.9 % (ref 12.4–15.4)
PERFORMED ON: ABNORMAL
PLATELET # BLD: 460 K/UL (ref 135–450)
PMV BLD AUTO: 8.3 FL (ref 5–10.5)
POTASSIUM SERPL-SCNC: 3.5 MMOL/L (ref 3.5–5.1)
RBC # BLD: 2.75 M/UL (ref 4.2–5.9)
SODIUM BLD-SCNC: 140 MMOL/L (ref 136–145)
WBC # BLD: 14.5 K/UL (ref 4–11)
WOUND/ABSCESS: ABNORMAL
WOUND/ABSCESS: ABNORMAL

## 2022-10-01 PROCEDURE — 6360000002 HC RX W HCPCS

## 2022-10-01 PROCEDURE — 85025 COMPLETE CBC W/AUTO DIFF WBC: CPT

## 2022-10-01 PROCEDURE — 36415 COLL VENOUS BLD VENIPUNCTURE: CPT

## 2022-10-01 PROCEDURE — 2580000003 HC RX 258

## 2022-10-01 PROCEDURE — 6370000000 HC RX 637 (ALT 250 FOR IP)

## 2022-10-01 PROCEDURE — 80048 BASIC METABOLIC PNL TOTAL CA: CPT

## 2022-10-01 PROCEDURE — 99232 SBSQ HOSP IP/OBS MODERATE 35: CPT | Performed by: INTERNAL MEDICINE

## 2022-10-01 PROCEDURE — 1200000000 HC SEMI PRIVATE

## 2022-10-01 RX ORDER — HEPARIN SODIUM 5000 [USP'U]/ML
5000 INJECTION, SOLUTION INTRAVENOUS; SUBCUTANEOUS EVERY 8 HOURS SCHEDULED
Status: DISCONTINUED | OUTPATIENT
Start: 2022-10-01 | End: 2022-10-05 | Stop reason: HOSPADM

## 2022-10-01 RX ORDER — FUROSEMIDE 10 MG/ML
20 INJECTION INTRAMUSCULAR; INTRAVENOUS ONCE
Status: COMPLETED | OUTPATIENT
Start: 2022-10-01 | End: 2022-10-01

## 2022-10-01 RX ADMIN — INSULIN LISPRO 5 UNITS: 100 INJECTION, SOLUTION INTRAVENOUS; SUBCUTANEOUS at 12:17

## 2022-10-01 RX ADMIN — LISINOPRIL 20 MG: 20 TABLET ORAL at 09:06

## 2022-10-01 RX ADMIN — IBUPROFEN 400 MG: 400 TABLET, FILM COATED ORAL at 03:29

## 2022-10-01 RX ADMIN — SODIUM CHLORIDE 25 ML: 9 INJECTION, SOLUTION INTRAVENOUS at 13:00

## 2022-10-01 RX ADMIN — INSULIN LISPRO 2 UNITS: 100 INJECTION, SOLUTION INTRAVENOUS; SUBCUTANEOUS at 12:12

## 2022-10-01 RX ADMIN — SODIUM CHLORIDE, PRESERVATIVE FREE 10 ML: 5 INJECTION INTRAVENOUS at 20:37

## 2022-10-01 RX ADMIN — OLANZAPINE 5 MG: 5 TABLET, FILM COATED ORAL at 09:06

## 2022-10-01 RX ADMIN — OLANZAPINE 5 MG: 5 TABLET, FILM COATED ORAL at 20:36

## 2022-10-01 RX ADMIN — INSULIN GLARGINE 10 UNITS: 100 INJECTION, SOLUTION SUBCUTANEOUS at 16:32

## 2022-10-01 RX ADMIN — INSULIN LISPRO 6 UNITS: 100 INJECTION, SOLUTION INTRAVENOUS; SUBCUTANEOUS at 08:15

## 2022-10-01 RX ADMIN — INSULIN LISPRO 5 UNITS: 100 INJECTION, SOLUTION INTRAVENOUS; SUBCUTANEOUS at 08:19

## 2022-10-01 RX ADMIN — DAPTOMYCIN 500 MG: 500 INJECTION, POWDER, LYOPHILIZED, FOR SOLUTION INTRAVENOUS at 13:01

## 2022-10-01 RX ADMIN — FUROSEMIDE 20 MG: 10 INJECTION, SOLUTION INTRAMUSCULAR; INTRAVENOUS at 12:05

## 2022-10-01 RX ADMIN — QUETIAPINE FUMARATE 25 MG: 25 TABLET ORAL at 20:36

## 2022-10-01 RX ADMIN — HEPARIN SODIUM 5000 UNITS: 5000 INJECTION INTRAVENOUS; SUBCUTANEOUS at 12:04

## 2022-10-01 RX ADMIN — INSULIN LISPRO 5 UNITS: 100 INJECTION, SOLUTION INTRAVENOUS; SUBCUTANEOUS at 16:31

## 2022-10-01 RX ADMIN — HEPARIN SODIUM 5000 UNITS: 5000 INJECTION INTRAVENOUS; SUBCUTANEOUS at 20:36

## 2022-10-01 ASSESSMENT — PAIN SCALES - GENERAL
PAINLEVEL_OUTOF10: 0
PAINLEVEL_OUTOF10: 0

## 2022-10-01 NOTE — PROGRESS NOTES
Called security throughout day today in order to ask for them to check hospital for patient's lost phone. Kept getting answering machine and so just left a message about this patient's lost phone, its description, and the last time family saw the device. Requested a call-back.

## 2022-10-01 NOTE — PROGRESS NOTES
Pt found with nasal cannula off to side of face, not in nostrils. O2 saturation 83% on room air. Replaced n/c, going at 3L/min and O2 quickly recovered to 97%.

## 2022-10-01 NOTE — PROGRESS NOTES
Internal Medicine Progress Note    Patient Name: Gray Palomares   Patient : 1955   Date: 10/1/2022   Admit Date: 2022     CC: Foot Pain (Pt reports he injured left foot last week and it was sutured. Today nurse from [facility] noticed swelling and redness around the foot. Pain 8/10)       Interval History: Patient seen and examined at bedside. No acute events overnight. Patient has remained afebrile overnight. Continues on oxygen to maintain saturations >90. Found to have NC off this morning and was at 83% on room air. Put back on 3L O2 and saturations improved to 97%. Patient POD#1 for left foot debridement, I&D and delayed primary closure. Patient denies all complaints, including headache, CP, SOB, bowel or urinary changes. HPI: Gray Palomares is a 74 y.o. M who presents from [address] [place] [address] for worsening of a recent foot injury. Notably, he was seen on 22 in the ER for an open intra-articular fracture thought the base of the first digit on the left. It was treated at bedside with flushing and repair by podiatry at that time, and the patient was given Ancef in the ER and sent home on Keflex. Patient did not follow up with Dr. Laquita Garcia outpatient as he was directed. Patient is Alert and Orientated x 2 (to person, place, but not year) and states he has trouble figuring out words. He is unable to tell me about his PMHx. His daughters were present when Podiatry saw the patient, and reportedly the family states they are unsure if the patient's facility did dressing changes or administered his antibiotics. Patient is a poor historian and is unable to corrobarate taking meds or not. Patient does endorse fevers. Family noticed redness and drainage from his foot wound. Patient complains of pain, even when not moving his foot. In the ED: Podiatry performed a bedside I&D and obtained wound cultures. ROS:  As per interval history above.       Vital Signs:  Patient Vitals for the past 8 hrs:   BP Temp Temp src Pulse Resp SpO2 Weight   10/01/22 0735 (!) 146/69 97.6 °F (36.4 °C) Oral 79 18 97 % --   10/01/22 0733 -- -- -- -- -- (!) 83 % --   10/01/22 0334 -- -- -- -- -- -- 202 lb 13.2 oz (92 kg)   10/01/22 0325 (!) 156/76 99.1 °F (37.3 °C) Oral 89 19 -- --       Physical Exam:  Physical Exam  Eyes:      Extraocular Movements: Extraocular movements intact. Cardiovascular:      Rate and Rhythm: Normal rate and regular rhythm. Pulmonary:      Effort: Pulmonary effort is normal.      Breath sounds: Normal breath sounds. Abdominal:      General: Abdomen is flat. Palpations: Abdomen is soft. Musculoskeletal:         General: Signs of injury present. Cervical back: Normal range of motion. Skin:     General: Skin is warm and dry. Mild pitting edema in lower extremities, imrpoving  Neurological:      Mental Status: He is alert.       Comments: Oriented to person and place, not year        Intake/Output Summary (Last 24 hours) at 10/1/2022 0854  Last data filed at 9/30/2022 1802  Gross per 24 hour   Intake 840 ml   Output 670 ml   Net 170 ml        Medications:   sodium chloride flush  5-40 mL IntraVENous 2 times per day    insulin glargine  10 Units SubCUTAneous Dinner    daptomycin (CUBICIN) IVPB  6 mg/kg (Adjusted) IntraVENous Q24H    lisinopril  20 mg Oral Daily    insulin lispro  0-8 Units SubCUTAneous TID     insulin lispro  0-4 Units SubCUTAneous Nightly    insulin lispro  5 Units SubCUTAneous TID     OLANZapine  5 mg Oral BID    QUEtiapine  25 mg Oral Nightly    [Held by provider] heparin (porcine)  5,000 Units SubCUTAneous 3 times per day       sodium chloride 25 mL (09/30/22 1454)    dextrose        sodium chloride flush, sodium chloride, ibuprofen, ondansetron **OR** ondansetron, polyethylene glycol, acetaminophen **OR** acetaminophen, oxyCODONE **OR** oxyCODONE, glucose, dextrose bolus **OR** dextrose bolus, glucagon (rDNA), dextrose     Labs:  CBC: Recent Labs     09/28/22  0943 09/29/22  0755 09/30/22  0645   WBC 11.5* 12.8* 13.8*   HGB 8.9* 8.9* 8.6*   HCT 27.5* 27.1* 25.8*    391 420   MCV 88.6 87.8 86.6       Renal:    Recent Labs     09/28/22  0943 09/29/22  0829 09/30/22  0645    138 139   K 3.9 4.0 3.5    106 103   CO2 25 23 25   BUN 14 12 13   CREATININE 1.0 0.8 1.0   GLUCOSE 276* 129* 65*   CALCIUM 8.1* 7.9* 8.6   ANIONGAP 9 9 11       Hepatic:   No results for input(s): AST, ALT, BILITOT, BILIDIR, PROT, LABALBU, ALKPHOS in the last 72 hours. Troponin: Invalid input(s): TROPONIN    Lactic acid: Invalid input(s): LACTICACID    BNP: No results for input(s): BNP in the last 72 hours. Pro-BNP:   Recent Labs     09/29/22  0755   PROBNP 1,663*       Lipids: No results for input(s): CHOL, TRIG, HDL, LDLCALC, VLDL in the last 72 hours. ABGs:  No results for input(s): PHART, VHV7NSW, PO2ART, ZYA4EPO, BEART, THGBART, U8ANFAFU, NJS0YVB in the last 72 hours. VBGs: No results for input(s): PHVEN, HST0GDT, PO2VEN in the last 72 hours. INR:   Recent Labs     09/29/22  1931   INR 1.17*     aPTT: No results for input(s): APTT in the last 72 hours. Procalcitonin: No results for input(s): PROCAL in the last 72 hours. CRP:   No results for input(s): CRP in the last 72 hours. ESR: No results for input(s): ESR in the last 72 hours. Radiology:  XR CHEST PORTABLE   Final Result      Significant progression of diffuse interstitial abnormality with new right lower lung and left upper lung alveolar consolidation in comparison to 9/26/2022. There is history of fever and elevated white blood cell count. Differential considerations    include asymmetric pulmonary edema and multifocal pneumonia. Normal heart size. XR CHEST (2 VW)   Final Result      Bilateral peribronchial thickening representing bronchitis or asthma. No acute pulmonary consolidation.          CT FOOT LEFT WO CONTRAST   Final Result Impression: Small locules of soft tissue gas adjacent to the major fracture fragment of the left great toe. Correlation for open fracture versus soft tissue infection recommended. XR FOOT LEFT (MIN 3 VIEWS)   Final Result   Impression: Increased interval displacement of the previously noted fracture fragment of the great toe. No discrete soft tissue gas. Assessment and Plan:  Jian Cantu is a 75 yo M who presents from facility for worsening of recent foot injury treated here at [facility] ER. L Hallux Infection 2/2 Traumatic Injury  Sepsis  Meets SIRS criteria   Bedside treated in ER on 9/17/22, unknown if complaint with wound dressings and Abx's since then  Patient febrile and hypertensive. ESR 90  -CT Foot: \"Small locules of soft tissue gas adjacent to the major fracture fragment of the left great toe. Correlation for open fracture versus soft tissue infection recommended\"  -Bedside I&D performed 9/26/22 in ER.   -non-weight bearing for now  -wound culture: MRSA, Corynebacterium striatum  -blood cultures: NGTD  -OR 9/27/22 with podiatry. Again returned to OR on 9/30/22 for I&D, delayed primary closure. Tolerated procedure well. -CXR: \"Significant progression of diffuse interstitial abnormality with new right lower lung and left upper lung alveolar consolidation in comparison to 9/26/2022. There is history of fever and elevated white blood cell count. Differential considerations   include asymmetric pulmonary edema and multifocal pneumonia. \"   -fluids stopped, spot dose lasix, Pro-BNP 1,663    -patient appears better after. O2 requirement is less, pulmonary sounds improving   -Surgical path results: \"Portion of bone with acute osteomyelitis and degenerative changes. Surrounding tissue with dense fibrosis and acute, focally necrotizing   inflammation. \"  - Received another dose of lasix yesterday. Per patient, SOB has improved some. Lower extremity pitting edema is improving.  Continue to titrate oxygen down to maintain O2 sats >90%. Cr remains stable, will give another dose of IV lasix. - Per ID, antibiotics switched to Daptomycin 500mg IV QD. Normocytic Anemia  Hx of iron deficiency anemia   Hg 9.6 on admission  -type and screen  -iron studies post-surgery: Iron 12, TIBC 168, Transferrin 156, Ferritin 530.6     Hypertension  -home lisinopril re-ordered in hospital.      Diabetes mellitus  -at home insulin  -HgA1c: 6.2  -hypoglycemia protocol  -corrective medium-dose algorithm   -Lispro 5 TID  -Lantus 10, with dinner  - Blood glucose has been difficult to control. Patient was noted to be snacking in his room. He was hypoglycemic yesterday and lantus was reduced slightly. Unspecified psychotic disorder  -home olanzapine re-ordered  -home quetiapine re-ordered     DVT PPx: Heparin subq  Diet:  ADULT DIET; Regular   Code status:  Full Code   ELOS: >3 nights  Barriers to discharge: I&D  Disposition  - Preadmission: [facility]  - Current: [facility]  - Upon discharge: TBA, possibly a different facility.  Referrals made     Will discuss with attending physician Dr. Kimberly Phillip MD.     Aisha Salcido,   Internal Medicine, PGY-2

## 2022-10-01 NOTE — PROGRESS NOTES
ID Follow-up NOTE    CC:   L Traumatic foot infection / osteomyelitis  Antibiotics: Daptomycin    Admit Date: 9/26/2022  Hospital Day: 6    Subjective:     POD#1 I&D, closure (no hallux amputation)  POD#4 I&D, purulence in 1st intermetatarsal space    No complains. Denies L foot pain, fevers / chills. Objective:     Afebrile (Tm 101.8 on 9/30 in am)    Patient Vitals for the past 8 hrs:   BP Temp Temp src Pulse Resp SpO2 Weight   10/01/22 0735 (!) 146/69 97.6 °F (36.4 °C) Oral 79 18 97 % --   10/01/22 0733 -- -- -- -- -- (!) 83 % --   10/01/22 0334 -- -- -- -- -- -- 202 lb 13.2 oz (92 kg)   10/01/22 0325 (!) 156/76 99.1 °F (37.3 °C) Oral 89 19 -- --       I/O last 3 completed shifts: In: 840 [P.O.:240; I.V.:600]  Out: 1270 [Urine:1250; Blood:20]  I/O this shift:  In: 240 [P.O.:240]  Out: 350 [Urine:350]    EXAM:  GENERAL: No apparent distress.   3L  HEENT: Membranes moist, no oral lesion  NECK:  Supple, no lymphadenopathy  LUNGS: Clear b/l, no rales, no dullness  CARDIAC: RRR, no murmur appreciated  ABD:  + BS, soft / NT  EXT:  No rash, no edema, no lesions  L foot with dressing, dry  NEURO: No focal neurologic findings  PSYCH: Sensorium, mood normal  LINES:  Peripheral IV      Data Review:  Lab Results   Component Value Date    WBC 14.5 (H) 10/01/2022    HGB 7.9 (L) 10/01/2022    HCT 24.0 (L) 10/01/2022    MCV 87.1 10/01/2022     (H) 10/01/2022     Lab Results   Component Value Date    CREATININE 1.0 10/01/2022    BUN 19 10/01/2022     10/01/2022    K 3.5 10/01/2022     10/01/2022    CO2 25 10/01/2022       Hepatic Function Panel:   Lab Results   Component Value Date/Time    ALKPHOS 99 09/26/2022 01:42 PM    ALT 17 09/26/2022 01:42 PM    AST 17 09/26/2022 01:42 PM    PROT 7.5 09/26/2022 01:42 PM    BILITOT <0.2 09/26/2022 01:42 PM    LABALBU 3.6 09/26/2022 01:42 PM       MICRO:  9/26 Wound culture - MRSA, C striatum  Staph aureus / MRSA  Antibiotic Interpretation Microscan    ceFAZolin Resistant >16 mcg/mL   clindamycin Resistant >4 mcg/mL   erythromycin Resistant >4 mcg/mL   oxacillin Resistant >2 mcg/mL   tetracycline Resistant >8 mcg/mL   trimethoprim-sulfamethoxazole Resistant >2/38 mcg/mL   vancomycin Sensitive 2 mcg/mL   Dapto SHAYE 1, Linezolid SHAYE 2 - both sensitive     BC - no growth     Surg / tissue cult: light MRSA, rare E faecalis (S amp Chel Age)      IMAGIN/27 CT foot Small locules of soft tissue gas adjacent to the major fracture fragment of the left great toe. Correlation for open fracture versus soft tissue infection recommended      Scheduled Meds:   heparin (porcine)  5,000 Units SubCUTAneous 3 times per day    sodium chloride flush  5-40 mL IntraVENous 2 times per day    insulin glargine  10 Units SubCUTAneous Dinner    daptomycin (CUBICIN) IVPB  6 mg/kg (Adjusted) IntraVENous Q24H    lisinopril  20 mg Oral Daily    insulin lispro  0-8 Units SubCUTAneous TID WC    insulin lispro  0-4 Units SubCUTAneous Nightly    insulin lispro  5 Units SubCUTAneous TID WC    OLANZapine  5 mg Oral BID    QUEtiapine  25 mg Oral Nightly       Continuous Infusions:   sodium chloride 25 mL (22 1454)    dextrose         PRN Meds:  sodium chloride flush, sodium chloride, ibuprofen, ondansetron **OR** ondansetron, polyethylene glycol, acetaminophen **OR** acetaminophen, oxyCODONE **OR** oxyCODONE, glucose, dextrose bolus **OR** dextrose bolus, glucagon (rDNA), dextrose      Assessment:     73 yo man with hx DM, HTN, psych d/o  Lives in [facility]     Pt sustained L hallux injury (running across the road), sustained open fx. Seen in [facility] ED , wound sutured, given po keflex. He developed swelling, pain, drainage      Presented to ED on  - T 101.2, Cr 13.5  X-ray - displaced fracture L hallux  CT - ST gas adjacent to hallux fracture fragment  Wound cult sent - GS 3+GPC.   Cult MRSA, C striatum  Admit,, started on Vancomycin + Cefepime  Seen by Podiatry, plan for I & D 9/27 - afeb, WBC 12.5. OR resection L hallux, noted to have fluctuance in plantar and dorsal aspect of 1st MT head  L foot with dressing   OR cult S aureus, E faecalis     9/28 Tm 101.1, WBC 11.5    9/29 Tm 102.5. WBC 12.8     IMP/  L hallux open fracture with infection   - x-ray with gas   - wound cult - MRSA (vanco SHAYE 2), C striatum   - OR 9/27 purulence   - OR 9/30 I&D and closure     Fever - poss due to R foot, down post f/u debridement 9/30  Leukocytosis - WBC up, 14.5 today    Plan:     Cont Daptomycin  Wound care and follow-up per Podiatry      Given severity of infection, decision to not amputate hallux, will recommend iv antiboitics  PICC  See PATRICE     Medical Decision Making:   The following items were considered in medical decision making:  Discussion of patient care with other providers  Review / order clinical lab tests  Review / order radiology tests  Review / order other diagnostic tests/interventions  Microbiology cultures and other micro tests reviewed       Discussed with pt  Garry Bamberger, MD    INFUSION ORDERS:  Daptomycin 500 mg iv daily through 10/26  - Diagnosis - L foot infection  - First dose given in hospital  - PICC   - Disposition / date discharge  - Check CBC w diff, CMP, ESR, CRP, CK every Mon or Tue - FAX result to [phone]  - Call with antibiotic / infusion issues, [phone]  - Call with any change in status, transfer in or out of a facility or to hospital - [phone]  - No f/u in outpatient ID office necessary

## 2022-10-01 NOTE — PROGRESS NOTES
Podiatric Surgery Daily Progress Note  Isa Osullivan      Subjective :   Patient seen and examined this am at the bedside. Patient denies any acute overnight events. Patient denies N/V/F/C/SOB. Patient denies calf pain, thigh pain, chest pain. Review of Systems: A 12 point review of symptoms is unremarkable with the exception of the chief complaint. Patient specifically denies nausea, fever, vomiting, chills, shortness of breath, chest pain, abdominal pain, constipation or difficulty urinating. Objective     BP (!) 146/69   Pulse 79   Temp 97.6 °F (36.4 °C) (Oral)   Resp 18   Ht 6' (1.829 m)   Wt 202 lb 13.2 oz (92 kg)   SpO2 97%   BMI 27.51 kg/m²      I/O:  Intake/Output Summary (Last 24 hours) at 10/1/2022 1009  Last data filed at 10/1/2022 0739  Gross per 24 hour   Intake 840 ml   Output 1020 ml   Net -180 ml              Wt Readings from Last 3 Encounters:   10/01/22 202 lb 13.2 oz (92 kg)       LABS:    Recent Labs     09/29/22  0755 09/30/22  0645   WBC 12.8* 13.8*   HGB 8.9* 8.6*   HCT 27.1* 25.8*    420        Recent Labs     09/30/22  0645      K 3.5      CO2 25   BUN 13   CREATININE 1.0        Recent Labs     09/29/22  1931   INR 1.17*           LOWER EXTREMITY EXAMINATION  Dressing to left lower extremity left clean, dry, and intact. No strikethrough noted to external dressing. CFT brisk to remaining digits bilateral.  Sensation diminished to digits bilateral 2/2 peripheral neuropathy. No pain with calf compression bilateral.  Patient able to perform active range of motion to digits bilateral.       IMAGING:  Xray foot; left:  Narrative   XR FOOT LEFT (MIN 3 VIEWS)       Indication: infection, eval for air        COMPARISON: 9/17/2022       Findings: 3 views of the left foot were obtained. Again identified is a fracture of the proximal phalanx of the great toe. There is increased interval displacement of the fracture fragment with rotation laterally.  No additional fracture deformity. No    soft tissue gas or acute abnormality. Impression   Impression: Increased interval displacement of the previously noted fracture fragment of the great toe. No discrete soft tissue gas. CT foot; left:  Narrative   CT FOOT LEFT WO CONTRAST       Indication: Left hallux infection       Technique: Helical CT images of the left foot were acquired without the administration of intravenous contrast media. Axial, coronal and sagittal reformatted images were constructed from the raw data set. Individualized dose optimization technique was    used in order to meet ALARA standards for radiation dose reduction. In addition to vendor specific dose reduction algorithms, the dose reduction techniques vary based on the specific scanner utilized but frequently include automated exposure control,    adjustment of the mA and/or kV according to patient size, and use of iterative reconstruction technique. COMPARISON: 9/17/2022. Findings: Again identified is fracture at the lateral aspect of the proximal phalanx of the left great toe. Small locules of gas are visualized adjacent to the fracture fragment. Adjacent soft tissue swelling of the left great toe is noted. Bandage    material overlies the plantar aspect of the great toe. No additional fracture deformity. No organized or drainable fluid collection. Impression   Impression: Small locules of soft tissue gas adjacent to the major fracture fragment of the left great toe. Correlation for open fracture versus soft tissue infection recommended. IMPRESSION/RECOMMENDATIONS:    - s/p left foot I&D with Delayed Primary closure (DOS 9/30/22)  -S/P left foot I&D (DOS 9/27/22)  -Diabetic foot infection, hallux 2/2 traumatic open fracture-resolved  -Diabetes mellitus type 2 with peripheral neuropathy     -Patient examined and evaluated at the bedside   -Hypertensive otherwise VSS.   No updated CBC this a.m.  -ESR 90 .6  -HbA1c 6.2  -X-ray and CT scan reviewed, impressions noted above  -Wound culture (9/26): MRSA and Corynebacterium stratum  -Surgical tissue culture (9/27): MRSA and Enterococcus faecalis  -Sugical Path: Portion of proximal phalanx with acute osteomyelitis and degenerative changes  -Continue IV antibiotics per ID recommendations  -ID following; recommend daptomycin  -Partial heel weightbearing to left lower extremity in surgical shoe with assistive device    DISPO: S/P incision and drainage with delayed primary closure of left foot (DOS 9/30/22). All labs and imaging reviewed. ID following; recommendations noted above. Patient is okay to discharge from podiatric standpoint pending final ID recs, SNF placement, PT/OT eval, and medical clearance.     Discussed assessment and plan with Dr. Ankush Madrid DPM.    Elvia Moctezuma DPM   Podiatric Resident PGY2  Pager [phone] or PerfectServe  10/1/2022, 10:17 AM

## 2022-10-01 NOTE — OP NOTE
Operative Note      Patient: Hill Waite  YOB: 1955  MRN: [id number]    Date of Procedure: 9/30/2022    Pre-Op Diagnosis: LEFT FOOT GANGRENE    Post-Op Diagnosis: Same       Procedure(s):  LEFT FOOT DEBRIDEMENT INCISION AND DRAINAGE AND DELAYED PRIMARY CLOSURE    Surgeon(s):  Noam Moran DPM    Assistant:   Resident: Iglesia Mariscal DPM    Anesthesia: Choice    Hemostasis: anatomic dissection     Injectables: pre-op 10cc of 1% lidocaine plain     Materials: 3-0 nylon      Estimated Blood Loss (mL): Minimal    Complications: None    Specimens:   * No specimens in log *    Implants:  * No implants in log *      Drains:   External Urinary Catheter (Active)   Urine Color Yellow 10/01/22 0739   Urine Appearance Clear 10/01/22 0739   Urine Odor Other (Comment) 10/01/22 0739   Output (mL) 350 mL 10/01/22 0739       Findings: Capillary refill was brisk to left hallux. Adequate bleeding was noted. Some dusky necrotic changed were noted to skin on the medial aspect of the 1st metatarsophalangeal joint that may require local wound care. Decision was made to not amputate toe. Procedure felt definitive     DETAILS OF PROCEDURE; LEFT FOOT DEBRIDEMENT INCISION AND DRAINAGE AND DELAYED PRIMARY CLOSURE: The patient was brought from the pre-operative area and placed on the operating table in the supine position. Following IV sedation, a local anesthetic block consisting of 10cc of 1% lidocaine plain was then injected proximal to the incision site. The left  lower extremity was then scrubbed, prepped, and draped in the usual sterile fashion. A time-out was performed. The patient, procedure, and operative site were confirmed and the following procedure was performed. At this time, attention was directed to the patients left foot where there was a full-thickness wound was noted to the lateral and plantar aspect of the hallucal sulcus.  At this time, blunt dissection was carried down over exploring all aspects of the wound. There was no marleny purulent drainage encountered intra-operatively. All devitalized and necrotic tissues were sharply excised from the wound using a #15 blade. The wound was then irrigated copiously with normal sterile saline via pulse lavage. Attention was directed back to the wound, it was inspected and found to be clean, granular and deemed appropriate for closure. Next, closure of the surgical site was initiated. The surgical incision was re-approximated with 3-0 nylon in a simple interrupted fashion. A soft sterile dressing consisting of adaptic, sterile gauze, joan, cast padding, and ace was then applied. Capillary refill was brisk to the left hallux and all other digits. END OF PROCEDURE: The patient tolerated the procedure and anesthesia well. The patient left the OR and was transferred to the PACU with vital signs stable and vascular status intact to all aspects of the left lower extremity. Following short period of postoperative monitoring, the patient will be readmitted to medical floor for further medical management and treatment of their medical comorbid conditions. DISPO: S/P left foot incision and drainage with delayed primary closure. Decision was made to not amputate left hallux. Patient will be transferred back to the floor. Procedure felt definitive. Patient will be ready for discharge from a podiatric standpoint pending final ID recs, SNF placement, PT/OT eval, and medical clearance.     Op note was dictated on behalf of Dr. Frantz Osorio ,Valorie Echeverria, RENA  Podiatric Resident PGY-1  Pager [phone] or Perfect Serve

## 2022-10-01 NOTE — PROGRESS NOTES
Spoke with patient's daughter, Raysa Alvarez, and provided update. Daughter expressed concern with patient's cell phone--states our nursing staff has been unable to locate it and that it has been missing since Thursday when she states the patient's sister was visiting, charged the phone for him, and placed it on his bedside table. I am unable to locate the device in the pt's room. Called EVS and also PACU to inquire about whether the device had been seen in those locations (since patient was in surgery for his foot yesterday). The device, a small black android phone, was not found in either location. Nurse in PACU states she will check same-day surgery as well and will call me back if device is found.

## 2022-10-02 LAB
ANAEROBIC CULTURE: ABNORMAL
ANION GAP SERPL CALCULATED.3IONS-SCNC: 10 MMOL/L (ref 3–16)
BASOPHILS ABSOLUTE: 0 K/UL (ref 0–0.2)
BASOPHILS RELATIVE PERCENT: 0.4 %
BUN BLDV-MCNC: 18 MG/DL (ref 7–20)
CALCIUM SERPL-MCNC: 8 MG/DL (ref 8.3–10.6)
CHLORIDE BLD-SCNC: 103 MMOL/L (ref 99–110)
CO2: 27 MMOL/L (ref 21–32)
CREAT SERPL-MCNC: 0.8 MG/DL (ref 0.8–1.3)
EOSINOPHILS ABSOLUTE: 0.2 K/UL (ref 0–0.6)
EOSINOPHILS RELATIVE PERCENT: 2 %
GFR AFRICAN AMERICAN: >60
GFR NON-AFRICAN AMERICAN: >60
GLUCOSE BLD-MCNC: 140 MG/DL (ref 70–99)
GLUCOSE BLD-MCNC: 152 MG/DL (ref 70–99)
GLUCOSE BLD-MCNC: 163 MG/DL (ref 70–99)
GLUCOSE BLD-MCNC: 165 MG/DL (ref 70–99)
GRAM STAIN RESULT: ABNORMAL
HCT VFR BLD CALC: 23.7 % (ref 40.5–52.5)
HEMOGLOBIN: 7.6 G/DL (ref 13.5–17.5)
LYMPHOCYTES ABSOLUTE: 1.8 K/UL (ref 1–5.1)
LYMPHOCYTES RELATIVE PERCENT: 14.3 %
MCH RBC QN AUTO: 28 PG (ref 26–34)
MCHC RBC AUTO-ENTMCNC: 32 G/DL (ref 31–36)
MCV RBC AUTO: 87.6 FL (ref 80–100)
MONOCYTES ABSOLUTE: 0.8 K/UL (ref 0–1.3)
MONOCYTES RELATIVE PERCENT: 6.3 %
NEUTROPHILS ABSOLUTE: 9.7 K/UL (ref 1.7–7.7)
NEUTROPHILS RELATIVE PERCENT: 77 %
ORGANISM: ABNORMAL
ORGANISM: ABNORMAL
PDW BLD-RTO: 14.8 % (ref 12.4–15.4)
PERFORMED ON: ABNORMAL
PLATELET # BLD: 473 K/UL (ref 135–450)
PMV BLD AUTO: 8.6 FL (ref 5–10.5)
POTASSIUM SERPL-SCNC: 4 MMOL/L (ref 3.5–5.1)
RBC # BLD: 2.7 M/UL (ref 4.2–5.9)
SODIUM BLD-SCNC: 140 MMOL/L (ref 136–145)
WBC # BLD: 12.6 K/UL (ref 4–11)
WOUND/ABSCESS: ABNORMAL
WOUND/ABSCESS: ABNORMAL

## 2022-10-02 PROCEDURE — 6360000002 HC RX W HCPCS

## 2022-10-02 PROCEDURE — 1200000000 HC SEMI PRIVATE

## 2022-10-02 PROCEDURE — 6370000000 HC RX 637 (ALT 250 FOR IP)

## 2022-10-02 PROCEDURE — 2580000003 HC RX 258

## 2022-10-02 PROCEDURE — 85025 COMPLETE CBC W/AUTO DIFF WBC: CPT

## 2022-10-02 PROCEDURE — 80048 BASIC METABOLIC PNL TOTAL CA: CPT

## 2022-10-02 PROCEDURE — 36415 COLL VENOUS BLD VENIPUNCTURE: CPT

## 2022-10-02 RX ORDER — FUROSEMIDE 10 MG/ML
40 INJECTION INTRAMUSCULAR; INTRAVENOUS ONCE
Status: COMPLETED | OUTPATIENT
Start: 2022-10-02 | End: 2022-10-02

## 2022-10-02 RX ADMIN — INSULIN LISPRO 5 UNITS: 100 INJECTION, SOLUTION INTRAVENOUS; SUBCUTANEOUS at 12:27

## 2022-10-02 RX ADMIN — OLANZAPINE 5 MG: 5 TABLET, FILM COATED ORAL at 22:01

## 2022-10-02 RX ADMIN — SODIUM CHLORIDE, PRESERVATIVE FREE 10 ML: 5 INJECTION INTRAVENOUS at 08:52

## 2022-10-02 RX ADMIN — INSULIN GLARGINE 10 UNITS: 100 INJECTION, SOLUTION SUBCUTANEOUS at 18:42

## 2022-10-02 RX ADMIN — ACETAMINOPHEN 650 MG: 325 TABLET, FILM COATED ORAL at 22:01

## 2022-10-02 RX ADMIN — HEPARIN SODIUM 5000 UNITS: 5000 INJECTION INTRAVENOUS; SUBCUTANEOUS at 22:01

## 2022-10-02 RX ADMIN — INSULIN LISPRO 5 UNITS: 100 INJECTION, SOLUTION INTRAVENOUS; SUBCUTANEOUS at 08:00

## 2022-10-02 RX ADMIN — HEPARIN SODIUM 5000 UNITS: 5000 INJECTION INTRAVENOUS; SUBCUTANEOUS at 14:20

## 2022-10-02 RX ADMIN — DAPTOMYCIN 500 MG: 500 INJECTION, POWDER, LYOPHILIZED, FOR SOLUTION INTRAVENOUS at 12:44

## 2022-10-02 RX ADMIN — HEPARIN SODIUM 5000 UNITS: 5000 INJECTION INTRAVENOUS; SUBCUTANEOUS at 06:30

## 2022-10-02 RX ADMIN — FUROSEMIDE 40 MG: 10 INJECTION, SOLUTION INTRAMUSCULAR; INTRAVENOUS at 11:18

## 2022-10-02 RX ADMIN — SODIUM CHLORIDE, PRESERVATIVE FREE 10 ML: 5 INJECTION INTRAVENOUS at 22:02

## 2022-10-02 RX ADMIN — OLANZAPINE 5 MG: 5 TABLET, FILM COATED ORAL at 08:52

## 2022-10-02 RX ADMIN — SODIUM CHLORIDE 25 ML: 9 INJECTION, SOLUTION INTRAVENOUS at 12:43

## 2022-10-02 RX ADMIN — INSULIN LISPRO 5 UNITS: 100 INJECTION, SOLUTION INTRAVENOUS; SUBCUTANEOUS at 18:42

## 2022-10-02 RX ADMIN — QUETIAPINE FUMARATE 25 MG: 25 TABLET ORAL at 22:01

## 2022-10-02 RX ADMIN — LISINOPRIL 20 MG: 20 TABLET ORAL at 08:52

## 2022-10-02 RX ADMIN — ACETAMINOPHEN 650 MG: 325 TABLET, FILM COATED ORAL at 06:32

## 2022-10-02 ASSESSMENT — PAIN SCALES - GENERAL
PAINLEVEL_OUTOF10: 0
PAINLEVEL_OUTOF10: 0

## 2022-10-02 NOTE — PLAN OF CARE
Problem: Infection - Adult  Goal: Absence of infection at discharge  Outcome: Progressing  Goal: Absence of infection during hospitalization  Outcome: Progressing     Problem: Safety - Adult  Goal: Free from fall injury  Outcome: Progressing     Problem: Skin/Tissue Integrity  Goal: Absence of new skin breakdown  Description: 1. Monitor for areas of redness and/or skin breakdown  2. Assess vascular access sites hourly  3. Every 4-6 hours minimum:  Change oxygen saturation probe site  4. Every 4-6 hours:  If on nasal continuous positive airway pressure, respiratory therapy assess nares and determine need for appliance change or resting period.   Outcome: Progressing

## 2022-10-02 NOTE — PROGRESS NOTES
Internal Medicine Progress Note    Patient Name: Sumaya Uribe   Patient : 1955   Date: 10/2/2022   Admit Date: 2022     CC: Foot Pain (Pt reports he injured left foot last week and it was sutured. Today nurse from [facility] noticed swelling and redness around the foot. Pain 8/10)       Interval History: Patient seen and examined at bedside. No acute events overnight. Continues on oxygen to maintain saturations >90. Attempted no oxygen but desatted to high 80's. Well saturated around 97 bon 2L nasal cannula. Will spot dose more lasix. Patient POD#2 for left foot debridement, I&D and delayed primary closure. Patient denies all complaints, including headache, CP, SOB, bowel or urinary changes. HPI: Sumaya Uribe is a 74 y.o. M who presents from [address] [place] [address] for worsening of a recent foot injury. Notably, he was seen on 22 in the ER for an open intra-articular fracture thought the base of the first digit on the left. It was treated at bedside with flushing and repair by podiatry at that time, and the patient was given Ancef in the ER and sent home on Keflex. Patient did not follow up with Dr. Gaspar  outpatient as he was directed. Patient is Alert and Orientated x 2 (to person, place, but not year) and states he has trouble figuring out words. He is unable to tell me about his PMHx. His daughters were present when Podiatry saw the patient, and reportedly the family states they are unsure if the patient's facility did dressing changes or administered his antibiotics. Patient is a poor historian and is unable to corrobarate taking meds or not. Patient does endorse fevers. Family noticed redness and drainage from his foot wound. Patient complains of pain, even when not moving his foot. In the ED: Podiatry performed a bedside I&D and obtained wound cultures. ROS:  As per interval history above.       Vital Signs:  Patient Vitals for the past 8 hrs:   BP Temp Temp src Pulse Resp SpO2   10/02/22 0735 -- -- -- -- -- 97 %   10/02/22 0731 (!) 160/70 99.7 °F (37.6 °C) Oral 71 16 99 %   10/02/22 0639 -- (!) 100.7 °F (38.2 °C) -- -- -- --   10/02/22 0442 (!) 166/64 -- Oral 83 18 95 %       Physical Exam:  Physical Exam  Eyes:      Extraocular Movements: Extraocular movements intact. Cardiovascular:      Rate and Rhythm: Normal rate and regular rhythm. Pulmonary:      Effort: Pulmonary effort is normal.      Breath sounds: Normal breath sounds. Abdominal:      General: Abdomen is flat. Palpations: Abdomen is soft. Musculoskeletal:         General: Signs of injury present. Cervical back: Normal range of motion. Skin:     General: Skin is warm and dry. Mild pitting edema in lower extremities, imrpoving  Neurological:      Mental Status: He is alert.       Comments: Oriented to person and place, not year        Intake/Output Summary (Last 24 hours) at 10/2/2022 0918  Last data filed at 10/2/2022 9351  Gross per 24 hour   Intake 720 ml   Output 1425 ml   Net -705 ml        Medications:   furosemide  40 mg IntraVENous Once    heparin (porcine)  5,000 Units SubCUTAneous 3 times per day    sodium chloride flush  5-40 mL IntraVENous 2 times per day    insulin glargine  10 Units SubCUTAneous Dinner    daptomycin (CUBICIN) IVPB  6 mg/kg (Adjusted) IntraVENous Q24H    lisinopril  20 mg Oral Daily    insulin lispro  0-8 Units SubCUTAneous TID WC    insulin lispro  0-4 Units SubCUTAneous Nightly    insulin lispro  5 Units SubCUTAneous TID WC    OLANZapine  5 mg Oral BID    QUEtiapine  25 mg Oral Nightly       sodium chloride 25 mL (10/01/22 1300)    dextrose        sodium chloride flush, sodium chloride, ibuprofen, ondansetron **OR** ondansetron, polyethylene glycol, acetaminophen **OR** acetaminophen, oxyCODONE **OR** oxyCODONE, glucose, dextrose bolus **OR** dextrose bolus, glucagon (rDNA), dextrose     Labs:  CBC:   Recent Labs     09/30/22  0645 10/01/22  3981 10/02/22  0807   WBC 13.8* 14.5* 12.6*   HGB 8.6* 7.9* 7.6*   HCT 25.8* 24.0* 23.7*    460* 473*   MCV 86.6 87.1 87.6       Renal:    Recent Labs     09/30/22  0645 10/01/22  0952    140   K 3.5 3.5    102   CO2 25 25   BUN 13 19   CREATININE 1.0 1.0   GLUCOSE 65* 252*   CALCIUM 8.6 8.4   ANIONGAP 11 13       Hepatic:   No results for input(s): AST, ALT, BILITOT, BILIDIR, PROT, LABALBU, ALKPHOS in the last 72 hours. Troponin: Invalid input(s): TROPONIN    Lactic acid: Invalid input(s): LACTICACID    BNP: No results for input(s): BNP in the last 72 hours. Pro-BNP:   No results for input(s): PROBNP in the last 72 hours. Lipids: No results for input(s): CHOL, TRIG, HDL, LDLCALC, VLDL in the last 72 hours. ABGs:  No results for input(s): PHART, RYP7LKE, PO2ART, ZEW1WNX, BEART, THGBART, Q7GETKSP, UGG6UST in the last 72 hours. VBGs: No results for input(s): PHVEN, XAK3BXZ, PO2VEN in the last 72 hours. INR:   Recent Labs     09/29/22  1931   INR 1.17*     aPTT: No results for input(s): APTT in the last 72 hours. Procalcitonin: No results for input(s): PROCAL in the last 72 hours. CRP:   No results for input(s): CRP in the last 72 hours. ESR: No results for input(s): ESR in the last 72 hours. Radiology:  XR CHEST PORTABLE   Final Result      Significant progression of diffuse interstitial abnormality with new right lower lung and left upper lung alveolar consolidation in comparison to 9/26/2022. There is history of fever and elevated white blood cell count. Differential considerations    include asymmetric pulmonary edema and multifocal pneumonia. Normal heart size. XR CHEST (2 VW)   Final Result      Bilateral peribronchial thickening representing bronchitis or asthma. No acute pulmonary consolidation.          CT FOOT LEFT WO CONTRAST   Final Result   Impression: Small locules of soft tissue gas adjacent to the major fracture fragment of the left great toe. Correlation for open fracture versus soft tissue infection recommended. XR FOOT LEFT (MIN 3 VIEWS)   Final Result   Impression: Increased interval displacement of the previously noted fracture fragment of the great toe. No discrete soft tissue gas. Assessment and Plan:  Analilia Butts is a 73 yo M who presents from facility for worsening of recent foot injury treated here at [facility]. L Hallux Infection 2/2 Traumatic Injury  Sepsis  Meets SIRS criteria   Bedside treated in ER on 9/17/22, unknown if complaint with wound dressings and Abx's since then  Patient febrile and hypertensive. ESR 90  -CT Foot: \"Small locules of soft tissue gas adjacent to the major fracture fragment of the left great toe. Correlation for open fracture versus soft tissue infection recommended\"  -Bedside I&D performed 9/26/22 in ER.   -non-weight bearing for now  -wound culture: MRSA, Corynebacterium striatum  -blood cultures: NGTD  -OR 9/27/22 with podiatry. Again returned to OR on 9/30/22 for I&D, delayed primary closure. Tolerated procedure well. -CXR: \"Significant progression of diffuse interstitial abnormality with new right lower lung and left upper lung alveolar consolidation in comparison to 9/26/2022. There is history of fever and elevated white blood cell count. Differential considerations   include asymmetric pulmonary edema and multifocal pneumonia. \"   -fluids stopped, spot dose lasix, Pro-BNP 1,663    -patient appears better after. O2 requirement is less, pulmonary sounds improving   -Surgical path results: \"Portion of bone with acute osteomyelitis and degenerative changes. Surrounding tissue with dense fibrosis and acute, focally necrotizing   inflammation. \"  - Received another dose of lasix yesterday. Per patient, SOB has improved some. Lower extremity pitting edema is improving. Continue to titrate oxygen down to maintain O2 sats >90%. Cr remains stable, will give another dose of IV lasix. - Per ID, antibiotics switched to Daptomycin 500mg IV QD.   -Need PICC line for discharge     Normocytic Anemia  Hx of iron deficiency anemia   Hg 9.6 on admission  -type and screen  -iron studies post-surgery: Iron 12, TIBC 168, Transferrin 156, Ferritin 530.6     Hypertension  -home lisinopril re-ordered in hospital.      Diabetes mellitus  -at home insulin  -HgA1c: 6.2  -hypoglycemia protocol  -corrective medium-dose algorithm   -Lispro 5 TID  -Lantus 10, with dinner  - Blood glucose has been difficult to control. Patient was noted to be snacking in his room. He was hypoglycemic yesterday and lantus was reduced slightly. Unspecified psychotic disorder  -home olanzapine re-ordered  -home quetiapine re-ordered     DVT PPx: Heparin subq  Diet:  ADULT DIET; Regular; 3 carb choices (45 gm/meal)   Code status:  Full Code   ELOS: >3 nights  Barriers to discharge: I&D  Disposition  - Preadmission: [facility]  - Current: [facility]  - Upon discharge: TBA, possibly a different facility.  Referrals made     Will discuss with attending physician Dr. Vamsi Hamilton MD.     Tom Hernandez MD  Internal Medicine, PGY-1

## 2022-10-02 NOTE — PROGRESS NOTES
Podiatric Surgery Daily Progress Note  [facility]      Subjective :   Patient seen and examined this am at the bedside. Patient denies any acute overnight events. Patient denies N/V/F/C/SOB. Patient denies calf pain, thigh pain, chest pain. Review of Systems: A 12 point review of symptoms is unremarkable with the exception of the chief complaint. Patient specifically denies nausea, fever, vomiting, chills, shortness of breath, chest pain, abdominal pain, constipation or difficulty urinating. Objective     BP (!) 160/70   Pulse 71   Temp 99.7 °F (37.6 °C) (Oral)   Resp 16   Ht 6' (1.829 m)   Wt 202 lb 13.2 oz (92 kg)   SpO2 97%   BMI 27.51 kg/m²      I/O:  Intake/Output Summary (Last 24 hours) at 10/2/2022 1029  Last data filed at 10/2/2022 0639  Gross per 24 hour   Intake 720 ml   Output 1425 ml   Net -705 ml              Wt Readings from Last 3 Encounters:   10/01/22 202 lb 13.2 oz (92 kg)       LABS:    Recent Labs     10/01/22  0952 10/02/22  0807   WBC 14.5* 12.6*   HGB 7.9* 7.6*   HCT 24.0* 23.7*   * 473*        Recent Labs     10/02/22  0807      K 4.0      CO2 27   BUN 18   CREATININE 0.8        Recent Labs     09/29/22  1931   INR 1.17*           LOWER EXTREMITY EXAMINATION  Dressing to left lower extremity left clean, dry, and intact. No strikethrough noted to external dressing. CFT brisk to remaining digits bilateral.  Sensation diminished to digits bilateral 2/2 peripheral neuropathy. No pain with calf compression bilateral.  Patient able to perform active range of motion to digits bilateral.       IMAGING:  Xray foot; left:  Narrative   XR FOOT LEFT (MIN 3 VIEWS)       Indication: infection, eval for air        COMPARISON: 9/17/2022       Findings: 3 views of the left foot were obtained. Again identified is a fracture of the proximal phalanx of the great toe. There is increased interval displacement of the fracture fragment with rotation laterally.  No additional fracture deformity. No    soft tissue gas or acute abnormality. Impression   Impression: Increased interval displacement of the previously noted fracture fragment of the great toe. No discrete soft tissue gas. CT foot; left:  Narrative   CT FOOT LEFT WO CONTRAST       Indication: Left hallux infection       Technique: Helical CT images of the left foot were acquired without the administration of intravenous contrast media. Axial, coronal and sagittal reformatted images were constructed from the raw data set. Individualized dose optimization technique was    used in order to meet ALARA standards for radiation dose reduction. In addition to vendor specific dose reduction algorithms, the dose reduction techniques vary based on the specific scanner utilized but frequently include automated exposure control,    adjustment of the mA and/or kV according to patient size, and use of iterative reconstruction technique. COMPARISON: 9/17/2022. Findings: Again identified is fracture at the lateral aspect of the proximal phalanx of the left great toe. Small locules of gas are visualized adjacent to the fracture fragment. Adjacent soft tissue swelling of the left great toe is noted. Bandage    material overlies the plantar aspect of the great toe. No additional fracture deformity. No organized or drainable fluid collection. Impression   Impression: Small locules of soft tissue gas adjacent to the major fracture fragment of the left great toe. Correlation for open fracture versus soft tissue infection recommended. IMPRESSION/RECOMMENDATIONS:    - s/p left foot I&D with Delayed Primary closure (DOS 9/30/22)  -S/P left foot I&D (DOS 9/27/22)  -Diabetic foot infection, hallux 2/2 traumatic open fracture-resolved  -Diabetes mellitus type 2 with peripheral neuropathy     -Patient examined and evaluated at the bedside   -Hypertensive otherwise VSS.   Leukocytosis noted (WBC 12.6)  -ESR 90 CRP 194.6  -HbA1c 6.2  -X-ray and CT scan reviewed, impressions noted above  -Blood cultures 1 and 2 NGTD  -Wound culture (9/26): MRSA and Corynebacterium stratum  -Surgical tissue culture (9/27): MRSA and Enterococcus faecalis  -Sugical Path: Portion of proximal phalanx with acute osteomyelitis and degenerative changes  -Continue IV antibiotics per ID recommendations  -ID following; recommend daptomycin through 10/26  -Partial heel weightbearing to left lower extremity in surgical shoe with assistive device    DISPO: S/P incision and drainage with delayed primary closure of left foot (DOS 9/30/22). All labs and imaging reviewed. ID following; recommendations noted above. Patient is okay to discharge from podiatric standpoint pending final ID recs, SNF placement, PT/OT eval, and medical clearance.     Discussed assessment and plan with Dr. Macrina Garcia DPM.    Allison Tinajero DPM   Podiatric Resident PGY2  Pager [phone] or [employer]  10/2/2022, 10:29 AM

## 2022-10-03 LAB
GLUCOSE BLD-MCNC: 105 MG/DL (ref 70–99)
GLUCOSE BLD-MCNC: 149 MG/DL (ref 70–99)
GLUCOSE BLD-MCNC: 230 MG/DL (ref 70–99)
GLUCOSE BLD-MCNC: 242 MG/DL (ref 70–99)
PERFORMED ON: ABNORMAL

## 2022-10-03 PROCEDURE — 6370000000 HC RX 637 (ALT 250 FOR IP)

## 2022-10-03 PROCEDURE — 2580000003 HC RX 258

## 2022-10-03 PROCEDURE — 99232 SBSQ HOSP IP/OBS MODERATE 35: CPT | Performed by: INTERNAL MEDICINE

## 2022-10-03 PROCEDURE — 1200000000 HC SEMI PRIVATE

## 2022-10-03 PROCEDURE — 97530 THERAPEUTIC ACTIVITIES: CPT

## 2022-10-03 PROCEDURE — 97535 SELF CARE MNGMENT TRAINING: CPT

## 2022-10-03 PROCEDURE — 2500000003 HC RX 250 WO HCPCS

## 2022-10-03 PROCEDURE — 02HV33Z INSERTION OF INFUSION DEVICE INTO SUPERIOR VENA CAVA, PERCUTANEOUS APPROACH: ICD-10-PCS | Performed by: INTERNAL MEDICINE

## 2022-10-03 PROCEDURE — 36569 INSJ PICC 5 YR+ W/O IMAGING: CPT

## 2022-10-03 PROCEDURE — 2500000003 HC RX 250 WO HCPCS: Performed by: INTERNAL MEDICINE

## 2022-10-03 PROCEDURE — 6360000002 HC RX W HCPCS

## 2022-10-03 PROCEDURE — 2580000003 HC RX 258: Performed by: INTERNAL MEDICINE

## 2022-10-03 PROCEDURE — C1751 CATH, INF, PER/CENT/MIDLINE: HCPCS

## 2022-10-03 RX ORDER — SODIUM CHLORIDE 0.9 % (FLUSH) 0.9 %
5-40 SYRINGE (ML) INJECTION PRN
Status: DISCONTINUED | OUTPATIENT
Start: 2022-10-03 | End: 2022-10-05 | Stop reason: HOSPADM

## 2022-10-03 RX ORDER — SODIUM CHLORIDE 9 MG/ML
25 INJECTION, SOLUTION INTRAVENOUS PRN
Status: DISCONTINUED | OUTPATIENT
Start: 2022-10-03 | End: 2022-10-05 | Stop reason: HOSPADM

## 2022-10-03 RX ORDER — SODIUM CHLORIDE 0.9 % (FLUSH) 0.9 %
5-40 SYRINGE (ML) INJECTION EVERY 12 HOURS SCHEDULED
Status: DISCONTINUED | OUTPATIENT
Start: 2022-10-03 | End: 2022-10-05 | Stop reason: HOSPADM

## 2022-10-03 RX ORDER — HYDRALAZINE HYDROCHLORIDE 20 MG/ML
10 INJECTION INTRAMUSCULAR; INTRAVENOUS EVERY 6 HOURS PRN
Status: DISCONTINUED | OUTPATIENT
Start: 2022-10-03 | End: 2022-10-05 | Stop reason: HOSPADM

## 2022-10-03 RX ORDER — LABETALOL HYDROCHLORIDE 5 MG/ML
10 INJECTION, SOLUTION INTRAVENOUS ONCE
Status: DISCONTINUED | OUTPATIENT
Start: 2022-10-03 | End: 2022-10-03 | Stop reason: SDUPTHER

## 2022-10-03 RX ORDER — LABETALOL HYDROCHLORIDE 5 MG/ML
10 INJECTION, SOLUTION INTRAVENOUS EVERY 6 HOURS PRN
Status: DISCONTINUED | OUTPATIENT
Start: 2022-10-03 | End: 2022-10-03 | Stop reason: CLARIF

## 2022-10-03 RX ORDER — LIDOCAINE HYDROCHLORIDE 10 MG/ML
5 INJECTION, SOLUTION EPIDURAL; INFILTRATION; INTRACAUDAL; PERINEURAL ONCE
Status: COMPLETED | OUTPATIENT
Start: 2022-10-03 | End: 2022-10-03

## 2022-10-03 RX ORDER — LABETALOL HYDROCHLORIDE 5 MG/ML
10 INJECTION, SOLUTION INTRAVENOUS EVERY 6 HOURS PRN
Status: DISCONTINUED | OUTPATIENT
Start: 2022-10-03 | End: 2022-10-05 | Stop reason: HOSPADM

## 2022-10-03 RX ADMIN — INSULIN LISPRO 5 UNITS: 100 INJECTION, SOLUTION INTRAVENOUS; SUBCUTANEOUS at 13:50

## 2022-10-03 RX ADMIN — LISINOPRIL 20 MG: 20 TABLET ORAL at 08:26

## 2022-10-03 RX ADMIN — SODIUM CHLORIDE, PRESERVATIVE FREE 10 ML: 5 INJECTION INTRAVENOUS at 22:55

## 2022-10-03 RX ADMIN — INSULIN LISPRO 2 UNITS: 100 INJECTION, SOLUTION INTRAVENOUS; SUBCUTANEOUS at 17:33

## 2022-10-03 RX ADMIN — QUETIAPINE FUMARATE 25 MG: 25 TABLET ORAL at 22:54

## 2022-10-03 RX ADMIN — INSULIN GLARGINE 10 UNITS: 100 INJECTION, SOLUTION SUBCUTANEOUS at 18:53

## 2022-10-03 RX ADMIN — OLANZAPINE 5 MG: 5 TABLET, FILM COATED ORAL at 08:26

## 2022-10-03 RX ADMIN — HEPARIN SODIUM 5000 UNITS: 5000 INJECTION INTRAVENOUS; SUBCUTANEOUS at 06:24

## 2022-10-03 RX ADMIN — HEPARIN SODIUM 5000 UNITS: 5000 INJECTION INTRAVENOUS; SUBCUTANEOUS at 22:54

## 2022-10-03 RX ADMIN — LIDOCAINE HYDROCHLORIDE 5 ML: 10 INJECTION, SOLUTION EPIDURAL; INFILTRATION; INTRACAUDAL; PERINEURAL at 13:27

## 2022-10-03 RX ADMIN — DAPTOMYCIN 500 MG: 500 INJECTION, POWDER, LYOPHILIZED, FOR SOLUTION INTRAVENOUS at 14:02

## 2022-10-03 RX ADMIN — HEPARIN SODIUM 5000 UNITS: 5000 INJECTION INTRAVENOUS; SUBCUTANEOUS at 14:07

## 2022-10-03 RX ADMIN — SODIUM CHLORIDE, PRESERVATIVE FREE 20 ML: 5 INJECTION INTRAVENOUS at 22:55

## 2022-10-03 RX ADMIN — SODIUM CHLORIDE, PRESERVATIVE FREE 10 ML: 5 INJECTION INTRAVENOUS at 13:58

## 2022-10-03 RX ADMIN — LABETALOL HYDROCHLORIDE 10 MG: 5 INJECTION, SOLUTION INTRAVENOUS at 13:58

## 2022-10-03 RX ADMIN — OLANZAPINE 5 MG: 5 TABLET, FILM COATED ORAL at 22:54

## 2022-10-03 RX ADMIN — SODIUM CHLORIDE, PRESERVATIVE FREE 10 ML: 5 INJECTION INTRAVENOUS at 22:54

## 2022-10-03 ASSESSMENT — PAIN SCALES - GENERAL: PAINLEVEL_OUTOF10: 0

## 2022-10-03 NOTE — PLAN OF CARE
Pt had fever of 101 overnight. Medicated with tylenol per orders. Denies any pain, dressing to L foot CDI. VSS, resting well overnight. Will continue to monitor.     Problem: Infection - Adult  Goal: Absence of infection at discharge  Outcome: Progressing     Problem: Safety - Adult  Goal: Free from fall injury  Outcome: Progressing     Problem: Pain  Goal: Verbalizes/displays adequate comfort level or baseline comfort level  Outcome: Progressing

## 2022-10-03 NOTE — CARE COORDINATION
CM followed  up on the  following referrals  . 1.)   [facility]  [address], [place], [address], [address]  Phone: [phone]      Fax: [phone]  VM LEFT REQUESTING CALL  BACK FROM [facility]  IN ADMISSIONS. 2.)  [facility], [address]  [phone]  SPOKE WITH MALIA IN ADMISSIONS AND  DO NOT HAVE  THE STAFF TO MEET  HIS  NEED S .AND DECLINED      3.)[facility]  Address: [address] [place] [address] [address] [place], [address] [place] [address]  Phone: [phone]   LEFT VM  TO FOLLOW UP REQUESTED  CALL BACK WITH  DETERMINATION. Veronica Chan  contact :  [phone]    Fax :  [phone]    4. )[facility] [address]  Phone: [phone]   Fax: [phone]    LEFT  VM FOR  ILYA RE: DETERMINATION  AND  REQUESTED  CALL BACK  . 5.)  [facility]  [address] , [place], [address]  Phone:  :  [phone]    LEFT  VM W AFIA TO CALL BACK  WITH DETERMINATION . CM  MADE  REFERRAL TO  [facility]  W/  YEFRI TO SEE IF ANY [facility]UNICARE  NURSING FACULTIES  COULD ACCPEPT AND MEET HIS NEEDS  . Awaiting return call. Electronically signed by Oxana Ruiz RN on 10/3/2022 at 4:07 PM  +++++++++++++++++++++++++++++++++++++++++++++++            CM  spoke with  Maki Daley in admissions at  [facility]  ,  she  was reviewing  pt  chart  and  may be able to accept ,  she  was reaching out   to the pt  daughter  with  a few questions  . And would  call this CM  back with and  update . CAMILLE updated  pt  sister  at  bedside  , but  Ashley Barkley  , his dgtr was not here yet .         [facility], [facility] [address]  [phone]      Electronically signed by Oxana Ruiz RN on 10/3/2022 at 3:12 PM          CM  cont to follow for  d/c planning:    Patient needs  SNF at  d/c      Referrals faxed:    PICC line ordered  . ID  rec:    Plan:      -Continue  Daptomycin  -Place PICC line before discharge    CM to follow upo n referrals  . No pre cert needed . Electronically signed by Gladis Babb RN on 10/3/2022 at 11:38 AM        Gladis Babb  RN Case Manager  [facility]  [address] [place] [address].   [place] [address]  [phone]  Fax [phone]

## 2022-10-03 NOTE — PROGRESS NOTES
Internal Medicine Progress Note    Patient Name: Danielle Patient   Patient : 1955   Date: 10/3/2022   Admit Date: 2022     CC: Foot Pain (Pt reports he injured left foot last week and it was sutured. Today nurse from [facility] noticed swelling and redness around the foot. Pain 8/10)       Interval History: Patient seen and examined at bedside. No acute events overnight. Patient only requires 1L of O2 today. Patient denies all complaints, including headache, CP, SOB, bowel or urinary changes. PICC lines and SNF placement for discharge. HPI: Danielle Patient is a 74 y.o. M who presents from [address] [place] [address] for worsening of a recent foot injury. Notably, he was seen on 22 in the ER for an open intra-articular fracture thought the base of the first digit on the left. It was treated at bedside with flushing and repair by podiatry at that time, and the patient was given Ancef in the ER and sent home on Keflex. Patient did not follow up with Dr. Cristian Burciaga outpatient as he was directed. Patient is Alert and Orientated x 2 (to person, place, but not year) and states he has trouble figuring out words. He is unable to tell me about his PMHx. His daughters were present when Podiatry saw the patient, and reportedly the family states they are unsure if the patient's facility did dressing changes or administered his antibiotics. Patient is a poor historian and is unable to corrobarate taking meds or not. Patient does endorse fevers. Family noticed redness and drainage from his foot wound. Patient complains of pain, even when not moving his foot. In the ED: Podiatry performed a bedside I&D and obtained wound cultures. ROS:  As per interval history above.       Vital Signs:  Patient Vitals for the past 8 hrs:   BP Temp Temp src Pulse Resp SpO2   10/03/22 0418 139/71 99.6 °F (37.6 °C) Oral 64 16 96 %   10/03/22 0005 (!) 131/59 99.2 °F (37.3 °C) Oral 65 16 96 %       Physical Exam:  Physical Exam  Eyes:      Extraocular Movements: Extraocular movements intact. Cardiovascular:      Rate and Rhythm: Normal rate and regular rhythm. Pulmonary:      Effort: Pulmonary effort is normal.      Breath sounds: Normal breath sounds. Abdominal:      General: Abdomen is flat. Palpations: Abdomen is soft. Musculoskeletal:         General: Signs of injury present. Cervical back: Normal range of motion. Skin:     General: Skin is warm and dry. Mild pitting edema in lower extremities, imrpoving  Neurological:      Mental Status: He is alert.       Comments: Oriented to person and place, not year        Intake/Output Summary (Last 24 hours) at 10/3/2022 0751  Last data filed at 10/3/2022 0419  Gross per 24 hour   Intake 780 ml   Output 1400 ml   Net -620 ml        Medications:   heparin (porcine)  5,000 Units SubCUTAneous 3 times per day    sodium chloride flush  5-40 mL IntraVENous 2 times per day    insulin glargine  10 Units SubCUTAneous Dinner    daptomycin (CUBICIN) IVPB  6 mg/kg (Adjusted) IntraVENous Q24H    lisinopril  20 mg Oral Daily    insulin lispro  0-8 Units SubCUTAneous TID WC    insulin lispro  0-4 Units SubCUTAneous Nightly    insulin lispro  5 Units SubCUTAneous TID WC    OLANZapine  5 mg Oral BID    QUEtiapine  25 mg Oral Nightly       sodium chloride 25 mL (10/02/22 1243)    dextrose        sodium chloride flush, sodium chloride, ibuprofen, ondansetron **OR** ondansetron, polyethylene glycol, acetaminophen **OR** acetaminophen, oxyCODONE **OR** oxyCODONE, glucose, dextrose bolus **OR** dextrose bolus, glucagon (rDNA), dextrose     Labs:  CBC:   Recent Labs     10/01/22  0952 10/02/22  0807   WBC 14.5* 12.6*   HGB 7.9* 7.6*   HCT 24.0* 23.7*   * 473*   MCV 87.1 87.6       Renal:    Recent Labs     10/01/22  0952 10/02/22  0807    140   K 3.5 4.0    103   CO2 25 27   BUN 19 18   CREATININE 1.0 0.8   GLUCOSE 252* 152*   CALCIUM 8.4 8.0*   ANIONGAP 13 10       Hepatic:   No results for input(s): AST, ALT, BILITOT, BILIDIR, PROT, LABALBU, ALKPHOS in the last 72 hours. Troponin: Invalid input(s): TROPONIN    Lactic acid: Invalid input(s): LACTICACID    BNP: No results for input(s): BNP in the last 72 hours. Pro-BNP:   No results for input(s): PROBNP in the last 72 hours. Lipids: No results for input(s): CHOL, TRIG, HDL, LDLCALC, VLDL in the last 72 hours. ABGs:  No results for input(s): PHART, TKS1OHD, PO2ART, XOJ1FHV, BEART, THGBART, I3EWBBWG, QSY2YSI in the last 72 hours. VBGs: No results for input(s): PHVEN, EXG5PAR, PO2VEN in the last 72 hours. INR:   No results for input(s): INR in the last 72 hours. aPTT: No results for input(s): APTT in the last 72 hours. Procalcitonin: No results for input(s): PROCAL in the last 72 hours. CRP:   No results for input(s): CRP in the last 72 hours. ESR: No results for input(s): ESR in the last 72 hours. Radiology:  XR CHEST PORTABLE   Final Result      Significant progression of diffuse interstitial abnormality with new right lower lung and left upper lung alveolar consolidation in comparison to 9/26/2022. There is history of fever and elevated white blood cell count. Differential considerations    include asymmetric pulmonary edema and multifocal pneumonia. Normal heart size. XR CHEST (2 VW)   Final Result      Bilateral peribronchial thickening representing bronchitis or asthma. No acute pulmonary consolidation. CT FOOT LEFT WO CONTRAST   Final Result   Impression: Small locules of soft tissue gas adjacent to the major fracture fragment of the left great toe. Correlation for open fracture versus soft tissue infection recommended. XR FOOT LEFT (MIN 3 VIEWS)   Final Result   Impression: Increased interval displacement of the previously noted fracture fragment of the great toe. No discrete soft tissue gas.             Assessment and Plan:  Estella Bosworth is a 75 yo M who presents from facility for worsening of recent foot injury treated here at [facility] ER. L Hallux Infection 2/2 Traumatic Injury  Sepsis  Meets SIRS criteria   Bedside treated in ER on 9/17/22, unknown if complaint with wound dressings and Abx's since then  Patient febrile and hypertensive. ESR 90  -CT Foot: \"Small locules of soft tissue gas adjacent to the major fracture fragment of the left great toe. Correlation for open fracture versus soft tissue infection recommended\"  -Bedside I&D performed 9/26/22 in ER.   -non-weight bearing for now  -wound culture: MRSA, Corynebacterium striatum  -blood cultures: NGTD  -OR 9/27/22 with podiatry. Again returned to OR on 9/30/22 for I&D, delayed primary closure. Tolerated procedure well. -CXR: \"Significant progression of diffuse interstitial abnormality with new right lower lung and left upper lung alveolar consolidation in comparison to 9/26/2022. There is history of fever and elevated white blood cell count. Differential considerations   include asymmetric pulmonary edema and multifocal pneumonia. \"   -fluids stopped, spot dose lasix, Pro-BNP 1,663    -patient appears better after. O2 requirement is less, pulmonary sounds improving   -Surgical path results: \"Portion of bone with acute osteomyelitis and degenerative changes. Surrounding tissue with dense fibrosis and acute, focally necrotizing   inflammation. \"  - Lasix has helped SOB improve some. Lower extremity pitting edema is improving. Continue to titrate oxygen down to maintain O2 sats >90%. Cr remains stable.   - Per ID, antibiotics switched to Daptomycin 500mg IV QD.   -Need PICC line for discharge     Normocytic Anemia  Hx of iron deficiency anemia   Hg 9.6 on admission  -type and screen  -iron studies post-surgery: Iron 12, TIBC 168, Transferrin 156, Ferritin 530.6     Hypertension  -home lisinopril re-ordered in hospital.      Diabetes mellitus  -at home insulin  -HgA1c: 6.2  -hypoglycemia protocol  -corrective medium-dose algorithm   -Lispro 5 TID  -Lantus 10, with dinner  - Blood glucose has been difficult to control. Patient was noted to be snacking in his room. He was hypoglycemic yesterday and lantus was reduced slightly. Unspecified psychotic disorder  -home olanzapine re-ordered  -home quetiapine re-ordered     DVT PPx: Heparin subq  Diet:  ADULT DIET; Regular; 3 carb choices (45 gm/meal)   Code status:  Full Code   ELOS: >3 nights  Barriers to discharge: I&D  Disposition  - Preadmission: [facility]  - Current: [facility]  - Upon discharge: TBA, possibly a different facility. Referrals made     Will discuss with attending physician Dr. Aleah Mccormick MD.     Erika Donovan MD  Internal Medicine, PGY-1  Patient seen and examined, labs and imaging studies reviewed, agree with assessment and plan as outlined above. Continue with current care and plan. Discussed case with patients nurse, discussed case with care team, discussed plan.       MD Alize Schofield

## 2022-10-03 NOTE — PLAN OF CARE
Problem: Infection - Adult  Goal: Absence of infection at discharge  10/3/2022 1652 by Wellington Pedraza RN  Outcome: Progressing

## 2022-10-03 NOTE — PROGRESS NOTES
Occupational Therapy  Occupational Therapy  Daily Treatment Note  Patient Name: Analilia Butts  MRN: [id number]    Assessment:   Pt requiring Max A x2 for stance from EOB and stand step transfer from EOB to chair with heavy poster lean. Bed mobility completed with Max A. Sitting EOB initially requiring Mod A with heavy posterior lean then progressing to CGA. Pt with inconsistent ability to maintaining heel only WB. Pt would benefit from inpt OT. Continue OT per POC. Discharge Recommendations:   Analilia Butts scored a 12/24 on the AM-PAC ADL Inpatient form. Current research shows that an AM-PAC score of 17 or less is typically not associated with a discharge to the patient's home setting. Based on the patient's AM-PAC score and their current ADL deficits, it is recommended that the patient have 3-5 sessions per week of Occupational Therapy at d/c to increase the patient's independence. Please see assessment section for further patient specific details. If patient discharges prior to next session this note will serve as a discharge summary. Please see below for the latest assessment towards goals. Equipment Needs:     Chart Reviewed: Yes       Other position/activity restrictions: Partial Weight Bearing to heel touch only in surgical shoe only to left lower extremity     Additional Pertinent Hx: 74 y.o. M admitted  from nursing facility 9/26 with sepsis due to Left hallux infection 2/2 recent traumatic open fracture. s/p bedside I&D, To OR 9/30 for I & D with delayed primary closure. PWB left heel in surgical shoe. PMHx includes DM, psychotic disorder with hallucinations. Treatment Diagnosis: Decreased activity tolerance, impaired ADLs and mobility    Subjective:   Pt met supine in bed and agreeable to OT treatment.      Pain: denies      Social/Functional History  Type of Home: Facility ([facility] skilled most recently)  Additional Comments: Admit date at [facility] listed as 8/4, CM able to determine pt was skilled. PLOF unknown. Pt reports typically living in a house with a friend but unable to provide timeline and full prior level of functioning  Prior Function  Additional Comments: Admit date at [facility] listed as 8/4, CM able to determine pt was skilled. PLOF unknown. Pt reports typically living in a house with a friend but unable to provide timeline and full prior level of functioning    Objective:    Cognition/Orientation:  Oriented x4    Bed mobility   Rolling: Max A with increased time and effort  Supine to sit: Max A with increased time and Vcs for tech  Scooting: Mod A scooting to EOB    Functional Mobility   Sit to Stand: Max A x2 with increased time and Vcs. Pt with one hand on RW  Stand to Sit: Max A x2  Bed to Chair Transfer:  Max A x2 for stand step transfer from EOB to Chair with inconsistent ability to maintain heel only WB precautions. Heavy posterior lean      ADLs   Grooming: CGA washing face  UB dressing: Mod A doffing and donning gown x2 due to urine on gown  LB dressing: Max A x2 doffing and donning brief due to urine incontinence. Toileting Dependent with condom cath falling off during session. RN made aware      Activity Tolerance:   Pt limited by weakness, fatigue and heel only WB status      Patient Education:   Role OT, Safe transfers - pt verb understanding      Safety Devices in Place:  Pt left seated in recliner chair with alarm on and call light in reach. Second Session Therapy Time:   Individual Concurrent Group Co-treatment   Time In 1432         Time Out 1515         Minutes 43           Timed Code Treatment Minutes:  43    Total Treatment Minutes:          Timed Code Treatment Minutes:    Total Treatment Time:     Goals: (as determined and assessed by primary OT)   Short Term Goals  Time Frame for Short Term Goals: by D/C  Short Term Goal 1: Maintain sitting balance 20 min with spvn - Not met  Short Term Goal 2: Perform all grooming tasks mod I, set up - Not met  Short Term Goal 3: Tolerate/Attempt stance at walker compliant with NWB L foot to progress mobility - Met 9/28; Static stance 2 min with CGA - Not met  Short Term Goal 4: Transfer to/from chair/BSC with min assist - Not met         Plan:      Times Per Week: 2-5x   Times Per Day: Daily    If patient is discharged prior to next treatment, this note will serve as the discharge summary.       [address] [place] [address], Ne, CARRANZA/L

## 2022-10-03 NOTE — PROCEDURES
SINGLE PICC PROCEDURE NOTE  Chart reviewed for allergies, diagnosis, labs, known contraindications, reason for line placement and planned length of treatment. Informed consent noted to be signed and on chart. Insertion procedure discussed with patient/family member. Three patient identifiers - Patient name,   and MRN -  completed &  confirmed verbally. Time out performed Hat, mask and eye shield donned. PICC site cleaned with chlorhexidine wipes then scrubbed with Chloraprep  One-Step applicator for 30 seconds x 1. Hand Hygiene  performed with 3% Chlorhexidine surgical scrub x1 min prior to  Sterile gloves, sterile gown being donned. Patient draped using maximal sterile barrier technique ( head to toe ). PICC site scrubbed a 2nd time with Chloraprep One-Step applicator x 30 sec. Modified Seldinger  technique/ultrasound assisted and tip locating system utilized for insertion. 1% Lidocaine 5 ml injected intradermal pre-insertion. PICC tip location in the SVC confirmed by ECG technology. Positive brisk blood return obtained from all lumens  and each lumen flushed with 10 mls  0.9% Sterile Sodium Chloride. All lumens flush easily with no resistance. Valve placed on all lumens followed by Alcohol Swab Caps on end of each. CHG dressing in place. Catheter secured with non-sutured locking device per hospital protocol. Sterile Tegaderm applied over PICC site. Sterile field maintained during procedure. PICC insertion, rhythm and positioning wire (utilized prn) accounted for post procedure and disposed of in sharps. Appearance of site is Clean dry and intact without bleeding or edema. All edges of Tegaderm occlusive. Site marked with date and initials of RN placing line. Teaching performed to pt/family and noted in education section. Bed placed in low position post-procedure.  Top 2 side rails in up position call button in reach.Pt. Response to procedure tolerated well. RN notified of all of the above. A Single Lumen Power Injectable PICC was trimmed at 44 CM and placed MORENA per brachial vein, 2 cm showing from insertion site.

## 2022-10-03 NOTE — PROGRESS NOTES
Physical Therapy  Facility/Department: [address] [place] [address]  Physical Therapy  Treatment    Name: Sanford Brewer  : 1955  MRN: [id number]  Date of Service: 10/3/2022    Discharge Recommendations:    Sanford Brewer scored a 24 on the AM-PAC short mobility form. Current research shows that an AM-PAC score of 17 or less is typically not associated with a discharge to the patient's home setting. Based on the patient's AM-PAC score and their current functional mobility deficits, it is recommended that the patient have 3-5 sessions per week of Physical Therapy at d/ to increase the patient's independence. Please see assessment section for further patient specific details. If patient discharges prior to next session this note will serve as a discharge summary. Please see below for the latest assessment towards goals. DME - defer to next LOC         Patient Diagnosis(es): The primary encounter diagnosis was Wound infection, posttraumatic. Diagnoses of Gangrene of left foot ([address] MRSA infection, and Abscess of left foot were also pertinent to this visit. Past Medical History:  has a past medical history of Altered mental status, Diabetes ([address] Hypertension, Iron deficiency anemia, and Psychotic disorder with hallucinations ([address] Past Surgical History:  has a past surgical history that includes Foot Debridement (Left, 2022) and Foot Debridement (Left, 2022). Assessment   Assessment: Pt is now able to put weight on left heel (PWB in surgical shoe). Required max x 2 to transfer sit to stand and bed to chair with RW. Inconsistent WB on LLE, uncertain if he was PWB the whole time, however he noted he was trying his best.  Recommend continued IP therapies to maximize mobility and safety. Activity Tolerance  Activity Tolerance: Patient limited by endurance; Patient limited by fatigue     Plan   Physcial Therapy Plan  General Plan:  (2-5)  Current Treatment Recommendations: Strengthening, Balance training, Gait training, Functional mobility training, Transfer training, Endurance training, Safety education & training, Patient/Caregiver education & training, Therapeutic activities  Safety Devices  Type of Devices: Nurse notified, Chair alarm in place, Call light within reach, Left in chair     Restrictions  Position Activity Restriction  Other position/activity restrictions: Partial Weight Bearing to heel touch only in surgical shoe only to left lower extremity     Subjective   General  Additional Pertinent Hx: 74 y.o. M admitted  from nursing facility 9/26 with sepsis due to Left hallux infection 2/2 recent traumatic open fracture. s/p bedside I&D, To OR 9/30 for I & D with delayed primary closure. PWB left heel in surgical shoe. PMHx includes DM, psychotic disorder with hallucinations. Referring Practitioner: Anita Ramos  Referral Date : 10/01/22  Diagnosis: wound infection         Social/Functional History  Social/Functional History  Type of Home: Facility ([facility] skilled most recently)  Additional Comments: Admit date at [facility] listed as 8/4, CM able to determine pt was skilled. PLOF unknown. Pt reports typically living in a house with a friend but unable to provide timeline and full prior level of functioning      Objective   Heart Rate: 78  Heart Rate Source: Monitor  BP: (!) 177/81 (administered lebatalol)  BP Location: Left upper arm  BP Method: Automatic  Patient Position: Semi fowlers  MAP (Calculated): 113  Resp: 17  SpO2: 92 %  O2 Device: None (Room air)           Bed mobility  Rolling to Left: Maximum assistance (hob elevated)  Supine to Sit: Maximum assistance (HOB elevated)  Scooting: Moderate assistance (to eob,  min to scoot back in chair)  Transfers  Sit to Stand: Maximum Assistance;2 Person Assistance (stood x 3 from eob, + posterior and right lean 2/2 ltd WB Left foot (heel only), max A x 2 to come to midline.   Max x 2 from chair to RW for pericare and new brief.)  Stand to Sit: Maximum Assistance;2 Person Assistance (to control descent)  Bed to Chair: Maximum assistance;2 Person Assistance (4-5 very small steps, + post lean max x 2 for safety, bed to chair - to the right.)        Balance  Comments: + posterior lean in sitting - required in initial mod assist to maintain static sitting, improved to CG - Sat on EOB 15 min  Pt semi stood for pericare and changing soaked brief.     AM-PAC Score  AM-PAC Inpatient Mobility Raw Score : 8 (10/03/22 1532)  AM-PAC Inpatient T-Scale Score : 28.52 (10/03/22 1532)  Mobility Inpatient CMS 0-100% Score: 86.62 (10/03/22 1532)  Mobility Inpatient CMS G-Code Modifier : CM (10/03/22 1532)    Goals  Short Term Goals  Time Frame for Short Term Goals: discharge  ongoing 10/3  Short Term Goal 1: patient will perform bed mobility with SBA  Short Term Goal 2: patient will perform transfers sit<>stand with min assist x 1  Short Term Goal 3: patient will perform transfers bed<>chair with min assist x 1  Short Term Goal 4: patient will ambulate 5' with FWW and min assist x 1 maintaining NWB on LLE  Patient Goals   Patient Goals : none stated       Education role of PT, DC plan, Safety, bed mobility, transfers    Therapy Time   Individual Concurrent Group Co-treatment   Time In 1432         Time Out 1515         Minutes 43             Timed Code Treatment Minutes:  43    Total Treatment Minutes:  [address], [id number]

## 2022-10-03 NOTE — PROGRESS NOTES
ID Follow-up NOTE  RESIDENT NOTE - reviewed / edited, attending note at bottom    CC:    L Traumatic foot infection / osteomyelitis  Antibiotics:  Daptomycin    Admit Date: 9/26/2022  Hospital Day: 8    Subjective:     Patient was seen and examined at bedside  Denied any fevers, chills, chest pain palpitation or shortness of breath. No L foot pain      Objective:     Patient Vitals for the past 8 hrs:   BP Temp Temp src Pulse Resp SpO2   10/03/22 0813 (!) 187/80 100.2 °F (37.9 °C) Oral 70 16 92 %   10/03/22 0418 139/71 99.6 °F (37.6 °C) Oral 64 16 96 %     I/O last 3 completed shifts: In: 1260 [P.O.:1260]  Out: 2400 [Urine:2400]  I/O this shift:  In: 120 [P.O.:120]  Out: -     EXAM:  GENERAL: No apparent distress.   On 1L  HEENT: Membranes moist, no oral lesion, diminished vision  NECK:  Supple, no lymphadenopathy  LUNGS: Clear b/l, no rales, no dullness  CARDIAC: RRR, no murmur appreciated  ABD:  + BS, soft / NT  EXT:  Wound dressing dry, no tenderness with touch  NEURO: No focal neurologic findings  PSYCH: Orientation, sensorium, mood normal  LINES:  Peripheral IV      Data Review:  Lab Results   Component Value Date    WBC 12.6 (H) 10/02/2022    HGB 7.6 (L) 10/02/2022    HCT 23.7 (L) 10/02/2022    MCV 87.6 10/02/2022     (H) 10/02/2022     Lab Results   Component Value Date    CREATININE 0.8 10/02/2022    BUN 18 10/02/2022     10/02/2022    K 4.0 10/02/2022     10/02/2022    CO2 27 10/02/2022       Hepatic Function Panel:   Lab Results   Component Value Date/Time    ALKPHOS 99 09/26/2022 01:42 PM    ALT 17 09/26/2022 01:42 PM    AST 17 09/26/2022 01:42 PM    PROT 7.5 09/26/2022 01:42 PM    BILITOT <0.2 09/26/2022 01:42 PM    LABALBU 3.6 09/26/2022 01:42 PM       MICRO:  9/26 Wound culture - MRSA, C striatum  Staph aureus / MRSA  Antibiotic Interpretation   ceFAZolin Resistant   clindamycin Resistant   erythromycin Resistant   oxacillin Resistant   tetracycline Resistant trimethoprim-sulfamethoxazole Resistant   vancomycin Sensitive   Dapto SHAYE 1, Linezolid SHAYE 2 - both sensitive      BC - no growth      Surg / tissue cult: light MRSA, rare E faecalis ([facility]      IMAGIN/27 CT foot Small locules of soft tissue gas adjacent to the major fracture fragment of the left great toe. Correlation for open fracture versus soft tissue infection recommended     CXR:  Significant progression of diffuse interstitial abnormality with new right lower lung and left upper lung alveolar consolidation in comparison to 2022. There is history of fever and elevated white blood cell count. Differential considerations    include asymmetric pulmonary edema and multifocal pneumonia.            Scheduled Meds:   heparin (porcine)  5,000 Units SubCUTAneous 3 times per day    sodium chloride flush  5-40 mL IntraVENous 2 times per day    insulin glargine  10 Units SubCUTAneous Dinner    daptomycin (CUBICIN) IVPB  6 mg/kg (Adjusted) IntraVENous Q24H    lisinopril  20 mg Oral Daily    insulin lispro  0-8 Units SubCUTAneous TID WC    insulin lispro  0-4 Units SubCUTAneous Nightly    insulin lispro  5 Units SubCUTAneous TID WC    OLANZapine  5 mg Oral BID    QUEtiapine  25 mg Oral Nightly       Continuous Infusions:   sodium chloride 25 mL (10/02/22 1243)    dextrose         PRN Meds:  labetalol, hydrALAZINE, sodium chloride flush, sodium chloride, ibuprofen, ondansetron **OR** ondansetron, polyethylene glycol, acetaminophen **OR** acetaminophen, oxyCODONE **OR** oxyCODONE, glucose, dextrose bolus **OR** dextrose bolus, glucagon (rDNA), dextrose      Assessment:     L Hallux Infection 2/ Traumatic Injury  Patient febrile and hypertensive in evening of 10/2   -ESR 90()  -Bedside I&D performed 22 in ER.   -non-weight bearing for now  -wound culture: +MRSA, Corynebacterium striatum ()  -blood cultures: NGTD  -OR 22 with podiatry.   -Again returned to [address] [place] [address] on 22 for I&D, delayed primary closure. Tolerated procedure well. -CXR: \"Significant progression of diffuse interstitial abnormality with new right lower lung and left upper lung alveolar consolidation in comparison to 9/26/2022. include asymmetric pulmonary edema and multifocal pneumonia. \"      Plan:     -Continue  Daptomycin  -Place PICC line before discharge    Discussed with Dr. Nia Mark MD PGY-1     75 yo man with hx DM, HTN, psych d/o  Lives in [facility]     Pt sustained L hallux injury (running across the road), sustained open fx. Seen in [facility] ED 9/17, wound sutured, given po keflex. He developed swelling, pain, drainage      Presented to ED on 9/26 - T 101.2, Cr 13.5  X-ray - displaced fracture L hallux  CT - ST gas adjacent to hallux fracture fragment  Wound cult sent - GS 3+GPC. Cult MRSA, C striatum  Admit,, started on Vancomycin + Cefepime  Seen by Podiatry, plan for I & D     9/27 - afeb, WBC 12.5. OR resection L hallux, noted to have fluctuance in plantar and dorsal aspect of 1st MT head  L foot with dressing   OR cult S aureus, E faecalis     9/28 Tm 101.1, WBC 11.5     9/29 Tm 102.5. WBC 12.8    10/3 Tm 100.2 (Tm 101 on 10/2)     IMP/  L hallux open fracture with infection   - x-ray with gas   - wound cult - MRSA (vanco SHAYE 2), C striatum   - OR 9/27 purulence   - OR 9/30 I&D and closure      Fever - poss due to R foot, down post f/u debridement 9/30  Leukocytosis - WBC 12.6 today    REC/  Cont Daptomycin  Wound care and follow-up per Podiatry      Given severity of infection, decision to not amputate hallux, will recommend iv antiboitics  PICC  See PATRICE     Medical Decision Making:   The following items were considered in medical decision making:  Discussion of patient care with other providers  Review / order clinical lab tests  Review / order radiology tests  Review / order other diagnostic tests/interventions  Microbiology cultures and other micro tests reviewed Discussed with pt  Libby Vinson MD     INFUSION ORDERS:  Daptomycin 500 mg iv daily through 10/26  - Diagnosis - L foot infection  - First dose given in hospital  - PICC   - Disposition / date discharge  - Check CBC w diff, CMP, ESR, CRP, CK every Mon or Tue - FAX result to [phone]  - Call with antibiotic / infusion issues, [phone]  - Call with any change in status, transfer in or out of a facility or to hospital - [phone]  - No f/u in outpatient ID office necessary

## 2022-10-03 NOTE — PROGRESS NOTES
Podiatric Surgery Daily Progress Note  Smita Rich      Subjective :   Patient seen and examined this am at the bedside. Patient is s/p I&D with delayed primary closure of the left hallux (DOS 9/30/22). Patient denies any acute overnight events. Patient denies N/V/F/C/SOB. Patient denies calf pain, thigh pain, chest pain. Review of Systems: A 12 point review of symptoms is unremarkable with the exception of the chief complaint. Patient specifically denies nausea, fever, vomiting, chills, shortness of breath, chest pain, abdominal pain, constipation or difficulty urinating. Objective     /71   Pulse 64   Temp 99.6 °F (37.6 °C) (Oral)   Resp 16   Ht 6' (1.829 m)   Wt 202 lb 13.2 oz (92 kg)   SpO2 96%   BMI 27.51 kg/m²      I/O:  Intake/Output Summary (Last 24 hours) at 10/3/2022 0501  Last data filed at 10/3/2022 0419  Gross per 24 hour   Intake 1260 ml   Output 2400 ml   Net -1140 ml              Wt Readings from Last 3 Encounters:   10/01/22 202 lb 13.2 oz (92 kg)       LABS:    Recent Labs     10/01/22  0952 10/02/22  0807   WBC 14.5* 12.6*   HGB 7.9* 7.6*   HCT 24.0* 23.7*   * 473*        Recent Labs     10/02/22  0807      K 4.0      CO2 27   BUN 18   CREATININE 0.8        No results for input(s): PROT, INR, APTT in the last 72 hours. LOWER EXTREMITY EXAMINATION    Dressing to the left lower extremity was clean dry and intact without strike through noted to the external dressing layers. Sanguinous drainage noted to the internal dressing layers without evidence of purulence. VASCULAR: DP and PT pulses are faintly palpable +1/4 b/l. Upon hand-held Doppler examination, DP and PT pulses were noted to be biphasic bilateral. CFT is brisk to the digits of the foot b/l while the CFT to the left hallux is noted to be slightly sluggish. Skin temperature is warm to cool from proximal to distal on right LE.  Skin temperature is warm to warm from proximal to distal on left with focal calor noted diffusely to the left forefoot. Edema noted diffusely to the left forefoot especially at the left hallux. Erythema noted to the left forefoot that tracks proximally until the level of the distal shin. No pain with calf compression b/l. NEUROLOGIC: Gross and epicritic sensation is diminished b/l. Protective sensation is absent at all pedal sites b/l. DERMATOLOGIC:   Left LE:  Sutures intact to the incision site of the left hallux and 1st webspace. Necrotic eschar noted along the plantar medial aspect of the left hallux incision site. Blanchable dermis noted just distal to the necrotic eschar at site of previous overlying bulla with evidence of epithelialization. Skin edges are well approximated without evidence of dehiscence. No fluctuance, crepitus, malodor, purulence noted. The wounds noted to tunnel, track or probe to bone. Verbal consent was obtained for photos today:              Right lower extremity is a closed soft tissue envelope without evidence of infection or ulceration. MUSCULOSKELETAL: Muscle strength is 5/5 for all pedal groups tested. Mild pain to palpation noted to the left hallux. Ankle joint ROM is decreased in dorsiflexion with the knee extended. No obvious biomechanical abnormalities. IMAGING:  Xray foot; left:  Narrative   XR FOOT LEFT (MIN 3 VIEWS)       Indication: infection, eval for air        COMPARISON: 9/17/2022       Findings: 3 views of the left foot were obtained. Again identified is a fracture of the proximal phalanx of the great toe. There is increased interval displacement of the fracture fragment with rotation laterally. No additional fracture deformity. No    soft tissue gas or acute abnormality. Impression   Impression: Increased interval displacement of the previously noted fracture fragment of the great toe. No discrete soft tissue gas.       CT foot; left:  Narrative   CT FOOT LEFT WO CONTRAST       Indication: Left hallux infection       Technique: Helical CT images of the left foot were acquired without the administration of intravenous contrast media. Axial, coronal and sagittal reformatted images were constructed from the raw data set. Individualized dose optimization technique was    used in order to meet ALARA standards for radiation dose reduction. In addition to vendor specific dose reduction algorithms, the dose reduction techniques vary based on the specific scanner utilized but frequently include automated exposure control,    adjustment of the mA and/or kV according to patient size, and use of iterative reconstruction technique. COMPARISON: 9/17/2022. Findings: Again identified is fracture at the lateral aspect of the proximal phalanx of the left great toe. Small locules of gas are visualized adjacent to the fracture fragment. Adjacent soft tissue swelling of the left great toe is noted. Bandage    material overlies the plantar aspect of the great toe. No additional fracture deformity. No organized or drainable fluid collection. Impression   Impression: Small locules of soft tissue gas adjacent to the major fracture fragment of the left great toe. Correlation for open fracture versus soft tissue infection recommended. IMPRESSION/RECOMMENDATIONS:    -s/p left foot I&D with Delayed Primary closure (DOS 9/30/22)  -S/P left foot I&D (DOS 9/27/22)  -Diabetic foot infection, hallux 2/2 traumatic open fracture-resolved  -Diabetes mellitus type 2 with peripheral neuropathy     -Patient examined and evaluated at the bedside   -Hypertensive otherwise VSS. Leukocytosis noted (WBC 12.6) No am labs.    -ESR 90 .6  -HbA1c 6.2  -X-ray and CT scan reviewed, impressions noted above  -Blood cultures 1 and 2 NGTD  -Wound culture (9/26): MRSA and Corynebacterium stratum  -Surgical tissue culture (9/27): MRSA and Enterococcus faecalis  -Sugical Path: Portion of proximal phalanx with acute osteomyelitis and degenerative changes  -Continue IV antibiotics per ID recommendations  -ID following; recommend daptomycin through 10/26, PICC line placed 10/3/22.   -Partial heel weightbearing to left lower extremity in surgical shoe with assistive device    DISPO: S/P incision and drainage with delayed primary closure of left foot (DOS 9/30/22). All labs and imaging reviewed. ID following; recommendations noted above. Patient is okay to discharge from podiatric standpoint pending  SNF placement, PT/OT eval, and medical clearance. Patient would benefit from further outpatient wound care following discharge. Will continue with local wound care while in house.      Discussed assessment and plan with Dr. Fred Domingo, Quintin Lennox, DPM   Podiatric Resident PGY1  Pager [phone] or Abdi  10/3/2022, 2:26 PM

## 2022-10-04 LAB
ANION GAP SERPL CALCULATED.3IONS-SCNC: 9 MMOL/L (ref 3–16)
BASOPHILS ABSOLUTE: 0.1 K/UL (ref 0–0.2)
BASOPHILS RELATIVE PERCENT: 0.5 %
BUN BLDV-MCNC: 16 MG/DL (ref 7–20)
CALCIUM SERPL-MCNC: 9 MG/DL (ref 8.3–10.6)
CHLORIDE BLD-SCNC: 96 MMOL/L (ref 99–110)
CO2: 29 MMOL/L (ref 21–32)
CREAT SERPL-MCNC: 0.9 MG/DL (ref 0.8–1.3)
EOSINOPHILS ABSOLUTE: 0.2 K/UL (ref 0–0.6)
EOSINOPHILS RELATIVE PERCENT: 2 %
GFR AFRICAN AMERICAN: >60
GFR NON-AFRICAN AMERICAN: >60
GLUCOSE BLD-MCNC: 115 MG/DL (ref 70–99)
GLUCOSE BLD-MCNC: 159 MG/DL (ref 70–99)
GLUCOSE BLD-MCNC: 215 MG/DL (ref 70–99)
GLUCOSE BLD-MCNC: 256 MG/DL (ref 70–99)
GLUCOSE BLD-MCNC: 290 MG/DL (ref 70–99)
HCT VFR BLD CALC: 25.9 % (ref 40.5–52.5)
HEMOGLOBIN: 8.3 G/DL (ref 13.5–17.5)
LYMPHOCYTES ABSOLUTE: 1.4 K/UL (ref 1–5.1)
LYMPHOCYTES RELATIVE PERCENT: 11.3 %
MCH RBC QN AUTO: 27.8 PG (ref 26–34)
MCHC RBC AUTO-ENTMCNC: 32.1 G/DL (ref 31–36)
MCV RBC AUTO: 86.6 FL (ref 80–100)
MONOCYTES ABSOLUTE: 0.6 K/UL (ref 0–1.3)
MONOCYTES RELATIVE PERCENT: 4.9 %
NEUTROPHILS ABSOLUTE: 10 K/UL (ref 1.7–7.7)
NEUTROPHILS RELATIVE PERCENT: 81.3 %
PDW BLD-RTO: 14.5 % (ref 12.4–15.4)
PERFORMED ON: ABNORMAL
PLATELET # BLD: 484 K/UL (ref 135–450)
PMV BLD AUTO: 7.7 FL (ref 5–10.5)
POTASSIUM REFLEX MAGNESIUM: 4 MMOL/L (ref 3.5–5.1)
RBC # BLD: 2.99 M/UL (ref 4.2–5.9)
SODIUM BLD-SCNC: 134 MMOL/L (ref 136–145)
TOTAL CK: 155 U/L (ref 39–308)
WBC # BLD: 12.3 K/UL (ref 4–11)

## 2022-10-04 PROCEDURE — 6370000000 HC RX 637 (ALT 250 FOR IP)

## 2022-10-04 PROCEDURE — 2580000003 HC RX 258: Performed by: INTERNAL MEDICINE

## 2022-10-04 PROCEDURE — 2580000003 HC RX 258

## 2022-10-04 PROCEDURE — 51701 INSERT BLADDER CATHETER: CPT

## 2022-10-04 PROCEDURE — 1200000000 HC SEMI PRIVATE

## 2022-10-04 PROCEDURE — 82550 ASSAY OF CK (CPK): CPT

## 2022-10-04 PROCEDURE — 6360000002 HC RX W HCPCS

## 2022-10-04 PROCEDURE — 99232 SBSQ HOSP IP/OBS MODERATE 35: CPT | Performed by: INTERNAL MEDICINE

## 2022-10-04 PROCEDURE — 2500000003 HC RX 250 WO HCPCS

## 2022-10-04 PROCEDURE — 80048 BASIC METABOLIC PNL TOTAL CA: CPT

## 2022-10-04 PROCEDURE — 85025 COMPLETE CBC W/AUTO DIFF WBC: CPT

## 2022-10-04 PROCEDURE — 6370000000 HC RX 637 (ALT 250 FOR IP): Performed by: INTERNAL MEDICINE

## 2022-10-04 PROCEDURE — 51798 US URINE CAPACITY MEASURE: CPT

## 2022-10-04 PROCEDURE — 92610 EVALUATE SWALLOWING FUNCTION: CPT

## 2022-10-04 RX ORDER — LISINOPRIL 10 MG/1
10 TABLET ORAL DAILY
Status: COMPLETED | OUTPATIENT
Start: 2022-10-04 | End: 2022-10-04

## 2022-10-04 RX ORDER — LINEZOLID 600 MG/1
600 TABLET, FILM COATED ORAL EVERY 12 HOURS SCHEDULED
Status: DISCONTINUED | OUTPATIENT
Start: 2022-10-04 | End: 2022-10-05 | Stop reason: HOSPADM

## 2022-10-04 RX ORDER — LISINOPRIL 30 MG/1
30 TABLET ORAL DAILY
Qty: 30 TABLET | Refills: 3 | Status: SHIPPED | OUTPATIENT
Start: 2022-10-05

## 2022-10-04 RX ADMIN — INSULIN LISPRO 4 UNITS: 100 INJECTION, SOLUTION INTRAVENOUS; SUBCUTANEOUS at 13:45

## 2022-10-04 RX ADMIN — LABETALOL HYDROCHLORIDE 10 MG: 5 INJECTION, SOLUTION INTRAVENOUS at 02:33

## 2022-10-04 RX ADMIN — LISINOPRIL 20 MG: 20 TABLET ORAL at 09:16

## 2022-10-04 RX ADMIN — HEPARIN SODIUM 5000 UNITS: 5000 INJECTION INTRAVENOUS; SUBCUTANEOUS at 14:34

## 2022-10-04 RX ADMIN — INSULIN LISPRO 5 UNITS: 100 INJECTION, SOLUTION INTRAVENOUS; SUBCUTANEOUS at 17:45

## 2022-10-04 RX ADMIN — SODIUM CHLORIDE, PRESERVATIVE FREE 10 ML: 5 INJECTION INTRAVENOUS at 20:56

## 2022-10-04 RX ADMIN — SODIUM CHLORIDE, PRESERVATIVE FREE 10 ML: 5 INJECTION INTRAVENOUS at 09:16

## 2022-10-04 RX ADMIN — OLANZAPINE 5 MG: 5 TABLET, FILM COATED ORAL at 09:16

## 2022-10-04 RX ADMIN — ACETAMINOPHEN 650 MG: 325 TABLET, FILM COATED ORAL at 02:17

## 2022-10-04 RX ADMIN — QUETIAPINE FUMARATE 25 MG: 25 TABLET ORAL at 20:55

## 2022-10-04 RX ADMIN — LISINOPRIL 10 MG: 10 TABLET ORAL at 13:08

## 2022-10-04 RX ADMIN — DAPTOMYCIN 500 MG: 500 INJECTION, POWDER, LYOPHILIZED, FOR SOLUTION INTRAVENOUS at 14:35

## 2022-10-04 RX ADMIN — RIFAMPIN 300 MG: 300 CAPSULE ORAL at 20:55

## 2022-10-04 RX ADMIN — INSULIN GLARGINE 10 UNITS: 100 INJECTION, SOLUTION SUBCUTANEOUS at 17:45

## 2022-10-04 RX ADMIN — ACETAMINOPHEN 650 MG: 325 TABLET, FILM COATED ORAL at 22:38

## 2022-10-04 RX ADMIN — HEPARIN SODIUM 5000 UNITS: 5000 INJECTION INTRAVENOUS; SUBCUTANEOUS at 20:55

## 2022-10-04 RX ADMIN — SODIUM CHLORIDE, PRESERVATIVE FREE 10 ML: 5 INJECTION INTRAVENOUS at 22:30

## 2022-10-04 RX ADMIN — HYDRALAZINE HYDROCHLORIDE 10 MG: 20 INJECTION INTRAMUSCULAR; INTRAVENOUS at 22:38

## 2022-10-04 RX ADMIN — HEPARIN SODIUM 5000 UNITS: 5000 INJECTION INTRAVENOUS; SUBCUTANEOUS at 06:43

## 2022-10-04 RX ADMIN — LINEZOLID 600 MG: 600 TABLET, FILM COATED ORAL at 20:55

## 2022-10-04 RX ADMIN — OLANZAPINE 5 MG: 5 TABLET, FILM COATED ORAL at 20:56

## 2022-10-04 RX ADMIN — SODIUM CHLORIDE, PRESERVATIVE FREE 10 ML: 5 INJECTION INTRAVENOUS at 09:00

## 2022-10-04 RX ADMIN — INSULIN LISPRO 2 UNITS: 100 INJECTION, SOLUTION INTRAVENOUS; SUBCUTANEOUS at 17:45

## 2022-10-04 RX ADMIN — INSULIN LISPRO 5 UNITS: 100 INJECTION, SOLUTION INTRAVENOUS; SUBCUTANEOUS at 13:45

## 2022-10-04 RX ADMIN — IBUPROFEN 400 MG: 400 TABLET, FILM COATED ORAL at 20:56

## 2022-10-04 ASSESSMENT — PAIN SCALES - GENERAL
PAINLEVEL_OUTOF10: 0

## 2022-10-04 NOTE — PROGRESS NOTES
D; Has been lethargic and sleepy all evening. Arouses easily and follows commands. Has not voided since 5pm and bladder scan >559ml. Pt stated doesn't have to void. B/p Elevated 172/61 and Labetalol 10mg given IVP see MAR. Temp 101 this pm and down to 100. A: Lev Blankenship MD was notified per perfect serve. Cont to monitor during hourly rounds    0625 R: Still unable to void. Gently st cath without difficulties and pt tolerated well. 575ml of vaibhav return removed and specimen sent to lab for u/a & C &S.  Cont to monitor during hourly rounds'

## 2022-10-04 NOTE — PROGRESS NOTES
Podiatric Surgery Daily Progress Note  Ha Notice      Subjective :   Patient seen and examined this am at the bedside. Patient denies any acute overnight events. Patient denies N/V/F/C/SOB. Patient denies calf pain, thigh pain, chest pain. Review of Systems: A 12 point review of symptoms is unremarkable with the exception of the chief complaint. Patient specifically denies nausea, fever, vomiting, chills, shortness of breath, chest pain, abdominal pain, constipation or difficulty urinating. Objective     BP (!) 150/71   Pulse 70   Temp 98.6 °F (37 °C) (Oral)   Resp 16   Ht 6' (1.829 m)   Wt 187 lb 6.3 oz (85 kg)   SpO2 98%   BMI 25.41 kg/m²      I/O:  Intake/Output Summary (Last 24 hours) at 10/4/2022 0809  Last data filed at 10/4/2022 0629  Gross per 24 hour   Intake 960 ml   Output 1055 ml   Net -95 ml              Wt Readings from Last 3 Encounters:   10/04/22 187 lb 6.3 oz (85 kg)       LABS:    Recent Labs     10/01/22  0952 10/02/22  0807   WBC 14.5* 12.6*   HGB 7.9* 7.6*   HCT 24.0* 23.7*   * 473*        Recent Labs     10/02/22  0807      K 4.0      CO2 27   BUN 18   CREATININE 0.8        No results for input(s): PROT, INR, APTT in the last 72 hours. LOWER EXTREMITY EXAMINATION  Dressing to left lower extremity left clean, dry, and intact. No strikethrough noted to external dressing. CFT brisk to remaining digits bilateral.  Sensation diminished to digits bilateral 2/2 peripheral neuropathy. No pain with calf compression bilateral.  Patient able to perform active range of motion to digits bilateral.     IMAGING:  Xray foot; left:  Narrative   XR FOOT LEFT (MIN 3 VIEWS)       Indication: infection, eval for air        COMPARISON: 9/17/2022       Findings: 3 views of the left foot were obtained. Again identified is a fracture of the proximal phalanx of the great toe. There is increased interval displacement of the fracture fragment with rotation laterally. No additional fracture deformity. No    soft tissue gas or acute abnormality. Impression   Impression: Increased interval displacement of the previously noted fracture fragment of the great toe. No discrete soft tissue gas. CT foot; left:  Narrative   CT FOOT LEFT WO CONTRAST       Indication: Left hallux infection       Technique: Helical CT images of the left foot were acquired without the administration of intravenous contrast media. Axial, coronal and sagittal reformatted images were constructed from the raw data set. Individualized dose optimization technique was    used in order to meet ALARA standards for radiation dose reduction. In addition to vendor specific dose reduction algorithms, the dose reduction techniques vary based on the specific scanner utilized but frequently include automated exposure control,    adjustment of the mA and/or kV according to patient size, and use of iterative reconstruction technique. COMPARISON: 9/17/2022. Findings: Again identified is fracture at the lateral aspect of the proximal phalanx of the left great toe. Small locules of gas are visualized adjacent to the fracture fragment. Adjacent soft tissue swelling of the left great toe is noted. Bandage    material overlies the plantar aspect of the great toe. No additional fracture deformity. No organized or drainable fluid collection. Impression   Impression: Small locules of soft tissue gas adjacent to the major fracture fragment of the left great toe. Correlation for open fracture versus soft tissue infection recommended. IMPRESSION/RECOMMENDATIONS:    - s/p left foot I&D with Delayed Primary closure (DOS 9/30/22)  -S/P left foot I&D (DOS 9/27/22)  -Diabetic foot infection, hallux 2/2 traumatic open fracture-resolved  -Diabetes mellitus type 2 with peripheral neuropathy     -Patient examined and evaluated at the bedside   -Hypertensive otherwise VSS.   No updated CBC this a.m.  -ESR 90 CRP 194.6  -HbA1c 6.2  -Imaging reviewed, impressions noted above  -Blood cultures 1 and 2 NGTD  -Wound culture (9/26): MRSA and Corynebacterium stratum  -Surgical tissue culture (9/27): MRSA and Enterococcus faecalis  -Sugical Path: Portion of proximal phalanx with acute osteomyelitis and degenerative changes  -Continue IV antibiotics per ID recommendations  -ID following; recommend daptomycin through 10/26  -Partial heel weightbearing to left lower extremity in surgical shoe with assistive device    DISPO: S/P incision and drainage with delayed primary closure of left foot (DOS 9/30/22). All labs and imaging reviewed. ID following; recommendations noted above. Patient is okay to discharge from podiatric standpoint pending SNF placement and medical clearance.     Patient was examined and evaluated with Dr. Yulissa Ponce DPM.    Jelena Dietrich DPM   Podiatric Resident PGY2  Pager [phone] or Perfect[employer]erve  10/4/2022, 8:09 AM

## 2022-10-04 NOTE — CARE COORDINATION
CM following  for  d/c planning :    CM followed up on  referrals  , as  patient and  family  hoping to secure  Placement in [address] [place] [address] [address] :    CM  expressed if  bed  was  not  found  / available  at  time of  D/c he  would need  to return to  previous facility which  they adamantly refuse:    CM  stated  we can assist and possibly find  other  placement  but  could not  guarantee  it would be  in [place]  d/t t barriers  . I explained to them such as possibly needing LTC  after  skilled stay  . And if they do not  have  open  LTC beds available  they will decline them as  they cannot keep them after  skilled stay . CM  informed Teressa Pt's dgtr,   that it was  discussed  yest  that once pt is medically cleared  for  d/c  that pt  could not  stay in acute  care  environment  to keep  making referrals  or till bed is avaialble  closer if  another  bed  or  placement is available at  a  facility that has  accepted  him and meet his needs. .       He  has  been  accepted  at  The  following facility and  transport has been arranged via  [address]   for  1400 . Pt and  family aware  . Doroteo Zavala  is  requesting  more  referrals,  and  CM  informed her that we  could  make some referrals  in AM  but if  no one accepts  by the time  transport is  here,  he  will need to  D/C to SNF  and  pursue  transferring  to different  location  from there  and they have  SW  available to  assist  her .  :        [address] [place]  Address: [address]  Phone: [phone]    Report:  [phone]:  [phone]    Critical access hospital  Document ID : [id number]    [facility]  contact  :  [phone]      Transport  Via  [address]  @  [phone]  [id number]/05/567[address]    Electronically signed by Jr Teran RN on 10/4/2022 at 6:55 PM       Jr Teran RN Case Manager  [facility]  [address]   [place] [address]  [phone]  Fax [phone]

## 2022-10-04 NOTE — DISCHARGE SUMMARY
INTERNAL MEDICINE DEPARTMENT AT [address] [place] [address]  DISCHARGE SUMMARY    Patient ID: Sanford Brewer                                             Discharge Date: 10/4/2022   Patient's PCP: Zo Alexander DO                                          Discharge Physician: Bola Dean MD  Admit Date: 9/26/2022   Admitting Physician: Edwin Morales MD      DISCHARGE DIAGNOSES:  Present on Admission:   Wound infection, posttraumatic   Abscess of left foot   MRSA infection   Displaced fracture of proximal phalanx of left great toe, initial encounter for open fracture        Outpatient To Do List:  Follow-up appointments  Primary care provider in 1 week      Hospital Course:  Sanford Brewer is a 74year old Male from facility who presented for Left foot infection following traumatic foot injury the week prior where the patient went to the ED and was given Ancef. Patient's Left foot was found to be purulent, tender, and erythematous. Wound cultures grew MRSA, but blood cultures showed no growth. The patient was given fluids, but the patient got a new oxygen requirement and had new pulmonary edema on CXR so spot doses of lasix were given with good relief. On 9/27/22 Left foot debridement and I&D was performed. It was determined the patient would require more surgery with possible Hallux amputation. On 9/30/22 Left foot debridement and I&D was performed again, but with no amputation needed. ID felt IV antibiotics would be appropriate and a PICC line was inserted on 10/3/22. It was then determined Daptomycin was too expensive outside the hospital, and antibiotics were switched to appropriate oral agents (rifampin and linezolid) and the PICC lines was removed before discharge. Additional findings or notes for primary provider:  Oral antibiotics through 10/26/22 per ID.      Physical Exam:  BP (!) 177/76   Pulse 71   Temp 97.8 °F (36.6 °C) (Oral)   Resp 15   Ht 6' (1.829 m)   Wt 187 lb 6.3 oz (85 kg)   SpO2 96%   BMI 25.41 kg/m²       Consults: ID, Podiatry    Significant Diagnostic Studies:  XR Foot, CT Foot, CXR    Treatments:  antibiotics: vancomycin, Cefepime, Daptomycin, Rifampin, Linezolid and surgery: I&D    Disposition:  SNF    Discharged Condition:  Stable      DISCHARGE MEDICATION:     Medication List        ASK your doctor about these medications      acetaminophen 325 MG tablet  Commonly known as: TYLENOL     ferrous sulfate 325 (65 Fe) MG tablet  Commonly known as: IRON 703     folic acid 1 MG tablet  Commonly known as: FOLVITE     insulin glargine 100 UNIT/ML injection vial  Commonly known as: LANTUS     lisinopril 20 MG tablet  Commonly known as: PRINIVIL;ZESTRIL     melatonin 3 MG Tabs tablet     metFORMIN 500 MG tablet  Commonly known as: GLUCOPHAGE     OLANZapine 5 MG tablet  Commonly known as: ZYPREXA     QUEtiapine 25 MG tablet  Commonly known as: SEROQUEL     Vitamin D2 50 MCG (2000 UT) Tabs            Activity: activity as tolerated, up with assistance  Diet: diabetic diet  Wound Care: as directed    Time spent on discharge is more than 30 minutes.     Signed:  Monique Roberts MD  Internal Medicine, PGY-1  10/4/2022

## 2022-10-04 NOTE — PROGRESS NOTES
ID Follow-up NOTE  RESIDENT NOTE - reviewed / edited, attending note at bottom    CC:    L Traumatic foot infection / osteomyelitis  Antibiotics:  Daptomycin    Admit Date: 9/26/2022  Hospital Day: 9    Subjective:     Patient was seen and examined at bedside. Denied any fevers, chills, chest pain palpitation or shortness of breath. No L foot pain      Objective:     Patient Vitals for the past 8 hrs:   BP Temp Temp src Pulse Resp SpO2 Weight   10/04/22 0915 (!) 177/76 97.8 °F (36.6 °C) Oral 71 15 96 % --   10/04/22 0531 (!) 150/71 98.6 °F (37 °C) Oral 70 16 98 % --   10/04/22 0200 -- -- -- -- -- 94 % --   10/04/22 0158 (!) 172/71 100 °F (37.8 °C) Oral 77 20 (!) 88 % --   10/04/22 0142 -- -- -- -- -- -- 187 lb 6.3 oz (85 kg)     I/O last 3 completed shifts: In: 1140 [P.O.:1140]  Out: 1055 [Urine:1055]  I/O this shift:  In: 240 [P.O.:240]  Out: -     EXAM:  GENERAL: No apparent distress.   On 1L  HEENT: Membranes moist, no oral lesion, diminished vision  NECK:  Supple, no lymphadenopathy  LUNGS: Clear b/l, no rales, no dullness  CARDIAC: RRR, no murmur appreciated  ABD:  + BS, soft / NT  EXT:  Wound dressing dry, no tenderness with touch  NEURO: No focal neurologic findings  PSYCH: Orientation, sensorium, mood normal  LINES:  LUE PICC line      Data Review:  Lab Results   Component Value Date    WBC 12.6 (H) 10/02/2022    HGB 7.6 (L) 10/02/2022    HCT 23.7 (L) 10/02/2022    MCV 87.6 10/02/2022     (H) 10/02/2022     Lab Results   Component Value Date    CREATININE 0.8 10/02/2022    BUN 18 10/02/2022     10/02/2022    K 4.0 10/02/2022     10/02/2022    CO2 27 10/02/2022       Hepatic Function Panel:   Lab Results   Component Value Date/Time    ALKPHOS 99 09/26/2022 01:42 PM    ALT 17 09/26/2022 01:42 PM    AST 17 09/26/2022 01:42 PM    PROT 7.5 09/26/2022 01:42 PM    BILITOT <0.2 09/26/2022 01:42 PM    LABALBU 3.6 09/26/2022 01:42 PM       MICRO:  9/26 Wound culture - MRSA, C striatum  Staph aureus / MRSA  Antibiotic Interpretation   ceFAZolin Resistant   clindamycin Resistant   erythromycin Resistant   oxacillin Resistant   tetracycline Resistant   trimethoprim-sulfamethoxazole Resistant   vancomycin Sensitive   Dapto SHAYE 1, Linezolid SHAYE 2 - both sensitive      BC - no growth      Surg / tissue cult: light MRSA, rare E faecalis (S amp John Irwin)      IMAGIN/27 CT foot Small locules of soft tissue gas adjacent to the major fracture fragment of the left great toe. Correlation for open fracture versus soft tissue infection recommended     CXR:  Significant progression of diffuse interstitial abnormality with new right lower lung and left upper lung alveolar consolidation in comparison to 2022. There is history of fever and elevated white blood cell count. Differential considerations    include asymmetric pulmonary edema and multifocal pneumonia.        Scheduled Meds:   sodium chloride flush  5-40 mL IntraVENous 2 times per day    heparin (porcine)  5,000 Units SubCUTAneous 3 times per day    sodium chloride flush  5-40 mL IntraVENous 2 times per day    insulin glargine  10 Units SubCUTAneous Dinner    daptomycin (CUBICIN) IVPB  6 mg/kg (Adjusted) IntraVENous Q24H    lisinopril  20 mg Oral Daily    insulin lispro  0-8 Units SubCUTAneous TID WC    insulin lispro  0-4 Units SubCUTAneous Nightly    insulin lispro  5 Units SubCUTAneous TID WC    OLANZapine  5 mg Oral BID    QUEtiapine  25 mg Oral Nightly       Continuous Infusions:   sodium chloride      sodium chloride 25 mL (10/02/22 1243)    dextrose         PRN Meds:  labetalol, hydrALAZINE, sodium chloride flush, sodium chloride, sodium chloride flush, sodium chloride, ibuprofen, ondansetron **OR** ondansetron, polyethylene glycol, acetaminophen **OR** acetaminophen, oxyCODONE **OR** oxyCODONE, glucose, dextrose bolus **OR** dextrose bolus, glucagon (rDNA), dextrose      Assessment:     L Hallux Infection 2/2 Traumatic Injury  Osteomylitis  Patient febrile and hypertensive in evening of 10/2   -ESR 90(9/26)  -Bedside I&D performed 9/26/22 in ER.   -non-weight bearing for now  -wound culture: +MRSA, Corynebacterium striatum (9/26)  -blood cultures: NGTD  -OR 9/27/22 with podiatry.   -Again returned to OR on 9/30/22 for I&D, delayed primary closure. Tolerated procedure well. -CXR: \"Significant progression of diffuse interstitial abnormality with new right lower lung and left upper lung alveolar consolidation in comparison to 9/26/2022. include asymmetric pulmonary edema and multifocal pneumonia. \"      Plan:     -Continue Daptomycin  -Manage and maintainPICC   -Wound care follow up with podiatry    Discussed with Dr. Jose Luis Gomez MD PGY-1     Addendum to Resident Progress note:  Pt seen, examined and evaluated. I have reviewed the current history, physical findings, labs and assessment and plan and agree with note as documented by resident (Dr. Nelly Guerrero). 75 yo man with hx DM, HTN, psych d/o  Lives in [facility]     Pt sustained L hallux injury (running across the road), sustained open fx. Seen in [facility] ED 9/17, wound sutured, given po keflex. He developed swelling, pain, drainage      Presented to ED on 9/26 - T 101.2, Cr 13.5  X-ray - displaced fracture L hallux  CT - ST gas adjacent to hallux fracture fragment  Wound cult sent - GS 3+GPC. Cult MRSA, C striatum  Admit,, started on Vancomycin + Cefepime  Seen by Podiatry, plan for I & D     9/27 - afeb, WBC 12.5. OR resection L hallux, noted to have fluctuance in plantar and dorsal aspect of 1st MT head  L foot with dressing   OR cult S aureus, E faecalis     9/28 Tm 101.1, WBC 11.5     9/29 Tm 102.5.   WBC 12.8     10/3 Tm 100.2 (Tm 101 on 10/2)     IMP/  L hallux open fracture with infection   - x-ray with gas   - wound cult - MRSA (vanco SHAYE 2), C striatum   - OR 9/27 purulence   - OR 9/30 I&D and closure      Fever - poss due to R foot, down post f/u debridement 9/30  Leukocytosis - WBC 12.6 on 10/2     REC/  Cont Daptomycin  Wound care and follow-up per Podiatry      Given severity of infection, decision to not amputate hallux, will recommend iv antiboitics    See PATRICE     Medical Decision Making:   The following items were considered in medical decision making:  Discussion of patient care with other providers  Review / order clinical lab tests  Review / order radiology tests  Review / order other diagnostic tests/interventions  Microbiology cultures and other micro tests reviewed       Discussed with pt, Discharge Stefano Galvez MD     ADDENDUM:  Daptomycin has relative high cost for ECF  Will give Linezolid + Rifampin as alternative    Rx to 10/26  See orders     Marthe Cheadle, MD

## 2022-10-04 NOTE — PROGRESS NOTES
Speech Language Pathology  Facility/Department: [address]   CLINICAL BEDSIDE SWALLOW EVALUATION & DC    NAME: Sumaya Uribe  : 1955  MRN: [id number]    ADMISSION DATE: 2022  ADMITTING DIAGNOSIS: has Wound infection, posttraumatic; Abscess of left foot; MRSA infection; and Displaced fracture of proximal phalanx of left great toe, initial encounter for open fracture on their problem list.  ONSET DATE: 2022    Date of Eval: 10/4/2022  Evaluating Therapist: LAUREN Boogie    Prior Chest Xray: (2022)  Impression       Bilateral peribronchial thickening representing bronchitis or asthma. No acute pulmonary consolidation. Repeat Chest Xray: (2022)  Impression       Significant progression of diffuse interstitial abnormality with new right lower lung and left upper lung alveolar consolidation in comparison to 2022. There is history of fever and elevated white blood cell count. Differential considerations    include asymmetric pulmonary edema and multifocal pneumonia. Normal heart size. Current Diet level:  Current Diet : Regular      Primary Complaint  Patient Complaint: Patient did not state. Referred d/t concern for swallow function. Pain:  Pain Assessment  Pain Assessment: None - Denies Pain  Pain Level: 0  Patient's Stated Pain Goal: 0 - No pain  Pain Location: Foot  Pain Orientation: Left  Pain Descriptors: Aching  Pain Type: Acute pain  Pain Frequency: Continuous    Reason for Referral  Sumaya Uribe was referred for a bedside swallow evaluation to assess the efficiency of his swallow function, identify signs and symptoms of aspiration and make recommendations regarding safe dietary consistencies, effective compensatory strategies, and safe eating environment. Prior Dysphagia History  None found in chart review. Per hard chart, pt was on regular texture diet and thin liquids at his nursing facility.     Impression  Dysphagia Diagnosis: No clinical indicators of dysphagia;Swallow function appears WFL  Dysphagia Impression : Swallow function appears grossly intact for PO trials assessed (single/sequential sips thins via straw, regular texture solid), with patient demonstrating adequate oral containment/prep/clearance, no overt signs of aspiration or penetration, no c/o globus sensation. Recommend continue regular texture solids and thin liquids. Dysphagia Outcome Severity Scale: Level 7: Normal in all situations     Treatment Plan  Requires SLP Intervention: No  Duration of Treatment: NA  D/C Recommendations: No follow up therapy recommended post discharge    Recommended Diet and Intervention  Regular solids and thin liquids  Recommended Form of Meds: PO     Therapeutic Interventions: Diet tolerance monitoring;Oral care; Patient/Family education; Therapeutic PO trials with SLP    Compensatory Swallowing Strategies  Compensatory Swallowing Strategies : Upright as possible for all oral intake; Alternate solids and liquids;Eat/Feed slowly    Treatment/Goals  Short-term Goals  Timeframe for Short-term Goals: NA  Long-term Goals  Timeframe for Long-term Goals: NA    General  Chart Reviewed: Yes    HPI (admitting note, 09/26/2022):  Omkar Marcelo is a 74 y.o. M who presents from [address] [place] [address] for worsening of a recent foot injury. Notably, he was seen on 9/17/22 in the ER for an open intra-articular fracture thought the base of the first digit on the left. It was treated at bedside with flushing and repair by podiatry at that time, and the patient was given Ancef in the ER and sent home on Keflex. Patient did not follow up with Dr. Kimberly Zamorano outpatient as he was directed. Patient is Alert and Orientated x 2 (to person, place, but not year) and states he has trouble figuring out words. He is unable to tell me about his PMHx.  His daughters were present when Podiatry saw the patient, and reportedly the family states they are unsure if the patient's facility did dressing changes or administered his antibiotics. Patient is a poor historian and is unable to corrobarate taking meds or not. Patient does endorse fevers. Family noticed redness and drainage from his foot wound. Patient complains of pain, even when not moving his foot.'    Subjective: Received pt awake, alert, pleasantly confused, resting in bed, on 2L O2 via nc. Behavior/Cognition: Alert; Cooperative;Pleasant mood  Respiratory Status: O2 via nasual cannula  O2 Device: Nasal cannula  Liters of Oxygen: 2 L  Communication Observation: Functional  Follows Directions: Simple  Dentition: Adequate  Patient Positioning: Upright in bed  Baseline Vocal Quality: Normal  Volitional Cough: Strong    Vision/Hearing  Vision  Vision: Impaired  Hearing  Hearing: Within functional limits    Oral Motor Deficits  WNL    Prognosis  Individuals consulted  Consulted and agree with results and recommendations: Patient    Education  Patient Education: Educated pt to purpose of visit, swallow function, aspiration s/s, diet recommendations, strategies. Patient Education Response: Verbalizes understanding  Safety Devices in place: Yes  Type of devices: All fall risk precautions in place; Bed alarm in place;Call light within reach; Telesitter in use;Left in bed       Therapy Time  SLP Individual Minutes  Time In: 1520  Time Out: [address]  Minutes: 18     SLP Total Treatment Time  Timed Code Treatment Minutes: 0 Minutes  Total Treatment Time: 18    Plan  Diet Recommendations: Regular texture solids / thin liquids / meds PO  - general aspiration precautions  - oral hygiene x2 daily    Discharge Plan:  no further dysphagia therapy appears warranted at this time  Discussed with RN, Maral Dolan. Needs within reach.     Electronically Signed by:  Scherrie Babinski, M.A., Walter Finn   Speech-Language Pathologist  Pager #[phone]

## 2022-10-04 NOTE — PROGRESS NOTES
Internal Medicine Progress Note    Patient Name: Estella Bosworth   Patient : 1955   Date: 10/4/2022   Admit Date: 2022     CC: Foot Pain (Pt reports he injured left foot last week and it was sutured. Today nurse from [facility] noticed swelling and redness around the foot. Pain 8/10)       Interval History: Patient seen and examined at bedside. Patient striaght cathed last night as bladder scan showed >559 mL, however patient endorsed not needing to void. Patient denies all complaints, including headache, CP, SOB, bowel or urinary changes. PICC lines and SNF placement for discharge. HPI: Estella Bosworth is a 74 y.o. M who presents from [address] [place] [address] for worsening of a recent foot injury. Notably, he was seen on 22 in the ER for an open intra-articular fracture thought the base of the first digit on the left. It was treated at bedside with flushing and repair by podiatry at that time, and the patient was given Ancef in the ER and sent home on Keflex. Patient did not follow up with Dr. Lydia Harp outpatient as he was directed. Patient is Alert and Orientated x 2 (to person, place, but not year) and states he has trouble figuring out words. He is unable to tell me about his PMHx. His daughters were present when Podiatry saw the patient, and reportedly the family states they are unsure if the patient's facility did dressing changes or administered his antibiotics. Patient is a poor historian and is unable to corrobarate taking meds or not. Patient does endorse fevers. Family noticed redness and drainage from his foot wound. Patient complains of pain, even when not moving his foot. In the ED: Podiatry performed a bedside I&D and obtained wound cultures. ROS:  As per interval history above.       Vital Signs:  Patient Vitals for the past 8 hrs:   BP Temp Temp src Pulse Resp SpO2 Weight   10/04/22 0531 (!) 150/71 98.6 °F (37 °C) Oral 70 16 98 % --   10/04/22 0200 -- -- -- -- -- 94 % --   10/04/22 0158 (!) 172/71 100 °F (37.8 °C) Oral 77 20 (!) 88 % --   10/04/22 0142 -- -- -- -- -- -- 187 lb 6.3 oz (85 kg)       Physical Exam:  Physical Exam  Eyes:      Extraocular Movements: Extraocular movements intact. Cardiovascular:      Rate and Rhythm: Normal rate and regular rhythm. Pulmonary:      Effort: Pulmonary effort is normal.      Breath sounds: Normal breath sounds. Abdominal:      General: Abdomen is flat. Palpations: Abdomen is soft. Musculoskeletal:         General: Signs of injury present. Cervical back: Normal range of motion. Skin:     General: Skin is warm and dry. Mild pitting edema in lower extremities, imrpoving  Neurological:      Mental Status: He is alert.       Comments: Oriented to person and place, not year        Intake/Output Summary (Last 24 hours) at 10/4/2022 0831  Last data filed at 10/4/2022 2567  Gross per 24 hour   Intake 960 ml   Output 915 ml   Net 45 ml        Medications:   sodium chloride flush  5-40 mL IntraVENous 2 times per day    heparin (porcine)  5,000 Units SubCUTAneous 3 times per day    sodium chloride flush  5-40 mL IntraVENous 2 times per day    insulin glargine  10 Units SubCUTAneous Dinner    daptomycin (CUBICIN) IVPB  6 mg/kg (Adjusted) IntraVENous Q24H    lisinopril  20 mg Oral Daily    insulin lispro  0-8 Units SubCUTAneous TID WC    insulin lispro  0-4 Units SubCUTAneous Nightly    insulin lispro  5 Units SubCUTAneous TID WC    OLANZapine  5 mg Oral BID    QUEtiapine  25 mg Oral Nightly       sodium chloride      sodium chloride 25 mL (10/02/22 1243)    dextrose        labetalol, hydrALAZINE, sodium chloride flush, sodium chloride, sodium chloride flush, sodium chloride, ibuprofen, ondansetron **OR** ondansetron, polyethylene glycol, acetaminophen **OR** acetaminophen, oxyCODONE **OR** oxyCODONE, glucose, dextrose bolus **OR** dextrose bolus, glucagon (rDNA), dextrose     Labs:  CBC:   Recent Labs 10/01/22  0952 10/02/22  0807   WBC 14.5* 12.6*   HGB 7.9* 7.6*   HCT 24.0* 23.7*   * 473*   MCV 87.1 87.6       Renal:    Recent Labs     10/01/22  0952 10/02/22  0807    140   K 3.5 4.0    103   CO2 25 27   BUN 19 18   CREATININE 1.0 0.8   GLUCOSE 252* 152*   CALCIUM 8.4 8.0*   ANIONGAP 13 10       Hepatic:   No results for input(s): AST, ALT, BILITOT, BILIDIR, PROT, LABALBU, ALKPHOS in the last 72 hours. Troponin: Invalid input(s): TROPONIN    Lactic acid: Invalid input(s): LACTICACID    BNP: No results for input(s): BNP in the last 72 hours. Pro-BNP:   No results for input(s): PROBNP in the last 72 hours. Lipids: No results for input(s): CHOL, TRIG, HDL, LDLCALC, VLDL in the last 72 hours. ABGs:  No results for input(s): PHART, UDZ8FXJ, PO2ART, CID2MIF, BEART, THGBART, K0CKFTFZ, UUP1CCJ in the last 72 hours. VBGs: No results for input(s): PHVEN, GHA0WVP, PO2VEN in the last 72 hours. INR:   No results for input(s): INR in the last 72 hours. aPTT: No results for input(s): APTT in the last 72 hours. Procalcitonin: No results for input(s): PROCAL in the last 72 hours. CRP:   No results for input(s): CRP in the last 72 hours. ESR: No results for input(s): ESR in the last 72 hours. Radiology:  XR CHEST PORTABLE   Final Result      Significant progression of diffuse interstitial abnormality with new right lower lung and left upper lung alveolar consolidation in comparison to 9/26/2022. There is history of fever and elevated white blood cell count. Differential considerations    include asymmetric pulmonary edema and multifocal pneumonia. Normal heart size. XR CHEST (2 VW)   Final Result      Bilateral peribronchial thickening representing bronchitis or asthma. No acute pulmonary consolidation.          CT FOOT LEFT WO CONTRAST   Final Result   Impression: Small locules of soft tissue gas adjacent to the major fracture fragment of the left great toe. Correlation for open fracture versus soft tissue infection recommended. XR FOOT LEFT (MIN 3 VIEWS)   Final Result   Impression: Increased interval displacement of the previously noted fracture fragment of the great toe. No discrete soft tissue gas. Assessment and Plan:  Michi Nicholson is a 73 yo M who presents from facility for worsening of recent foot injury treated here at [facility] ER. L Hallux Infection 2/2 Traumatic Injury  Sepsis  Meets SIRS criteria   Bedside treated in ER on 9/17/22, unknown if complaint with wound dressings and Abx's since then  Patient febrile and hypertensive. ESR 90  -CT Foot: \"Small locules of soft tissue gas adjacent to the major fracture fragment of the left great toe. Correlation for open fracture versus soft tissue infection recommended\"  -Bedside I&D performed 9/26/22 in ER.   -non-weight bearing for now  -wound culture: MRSA, Corynebacterium striatum  -blood cultures: NGTD  -OR 9/27/22 with podiatry. Again returned to OR on 9/30/22 for I&D, delayed primary closure. Tolerated procedure well. -CXR: \"Significant progression of diffuse interstitial abnormality with new right lower lung and left upper lung alveolar consolidation in comparison to 9/26/2022. There is history of fever and elevated white blood cell count. Differential considerations   include asymmetric pulmonary edema and multifocal pneumonia. \"   -fluids stopped, spot dose lasix, Pro-BNP 1,663    -patient appears better after. O2 requirement is less, pulmonary sounds improving   -Surgical path results: \"Portion of bone with acute osteomyelitis and degenerative changes. Surrounding tissue with dense fibrosis and acute, focally necrotizing   inflammation. \"  - Lasix has helped SOB improve some. Lower extremity pitting edema is improving. Continue to titrate oxygen down to maintain O2 sats >90%. Cr remains stable.   - Per ID, antibiotics switched to Daptomycin 500mg IV QD.   -Need PICC line for discharge: update, PICC placed     Normocytic Anemia  Hx of iron deficiency anemia   Hg 9.6 on admission  -type and screen  -iron studies post-surgery: Iron 12, TIBC 168, Transferrin 156, Ferritin 530.6     Hypertension  -home lisinopril re-ordered in hospital.   -PRN BP meds in place     Diabetes mellitus  -at home insulin  -HgA1c: 6.2  -hypoglycemia protocol  -corrective medium-dose algorithm   -Lispro 5 TID  -Lantus 10, with dinner  - Blood glucose has been difficult to control. Patient was noted to be snacking in his room. He was hypoglycemic yesterday and lantus was reduced slightly. Unspecified psychotic disorder  -home olanzapine re-ordered  -home quetiapine re-ordered     DVT PPx: Heparin subq  Diet:  ADULT DIET; Regular; 3 carb choices (45 gm/meal)  ADULT ORAL NUTRITION SUPPLEMENT; Breakfast, Lunch, Dinner, HS Snack; Fortified Pudding Oral Supplement   Code status:  Full Code   ELOS: >3 nights  Barriers to discharge: I&D  Disposition  - Preadmission: [facility]  - Current: [facility]  - Upon discharge: TBA, possibly a different facility. Referrals made     Will discuss with attending physician Dr. Belinda Webb MD.     Vito Dobson MD  Internal Medicine, PGY-1  Patient seen and examined, labs and imaging studies reviewed, agree with assessment and plan as outlined above. Continue with current care and plan. Discussed case with patients nurse, discussed case with care team, discussed plan.       Belinda Webb MD [address]

## 2022-10-04 NOTE — PROGRESS NOTES
Comprehensive Nutrition Assessment    Type and Reason for Visit:  Initial, Consult    Nutrition Recommendations/Plan:   Continue CCC-3 Diet  Modify ONS: decrease Boost pudding to QD; Begin Glucerna BID. Will monitor nutrition adequacy, nutrition-related labs, weights, Bms,and clinical progress. Malnutrition Assessment:  Malnutrition Status: At risk for malnutrition (Comment) (10/04/22 1113)    Context:  Acute Illness     Findings of the 6 clinical characteristics of malnutrition:  Energy Intake:  Mild decrease in energy intake (Comment) (varied intake since admit)  Weight Loss:  No significant weight loss     Body Fat Loss:  No significant body fat loss     Muscle Mass Loss:  No significant muscle mass loss    Fluid Accumulation:  Mild     Strength:  Not Performed    Nutrition Assessment:    Consult for Poor Intake/Appetite 5 or more days. Pt admitted with sepsis due to infection lt foot. Receiving a CCC-3 Diet with varied po intake of % of meals consumed. Reported good appetite when visited and denied recent weight loss. CBW-187 lbs, essentially stable with some fluctuations since admit. Receiving Boost pudding BID, however, willing to trial Glucerna BID for added nutrition. Noted snacks in room brought in by family. Will monitor po intake and acceptance of supplements. Nutrition Related Findings:    Glu 159. Lytes wnl. RLE +2 pitting edema. +BS. No Bm recorded. +3.3L Wound Type: Surgical Incision (left toe)       Current Nutrition Intake & Therapies:    Average Meal Intake: 26-50%, 51-75%, %  Average Supplements Intake: Unable to assess  ADULT DIET; Regular; 3 carb choices (45 gm/meal)  ADULT ORAL NUTRITION SUPPLEMENT; Breakfast, Lunch, Dinner, HS Snack; Fortified Pudding Oral Supplement    Anthropometric Measures:  Height: 6' (182.9 cm)  Ideal Body Weight (IBW): 178 lbs (81 kg)    Admission Body Weight: 187 lb (84.8 kg)  Current Body Weight: 187 lb (84.8 kg), 105.1 % IBW.  Weight Source: Bed Scale  Current BMI (kg/m2): 25.4        Weight Adjustment For: No Adjustment                 BMI Categories: Overweight (BMI 25.0-29. 9)    Estimated Daily Nutrient Needs:  Energy Requirements Based On: Kcal/kg (25-30 kcal/CBW/85 kg)  Weight Used for Energy Requirements: Current  Energy (kcal/day): 9586-3866  Weight Used for Protein Requirements: Current (1.2-1.5 gm/CBW/85 kg)  Protein (g/day): 102-127  Method Used for Fluid Requirements: 1 ml/kcal        Nutrition Diagnosis:   Inadequate oral intake related to inadequate protein-energy intake as evidenced by intake 26-50%, intake 51-75%  Increased nutrient needs related to increase demand for energy/nutrients as evidenced by wounds (surgical incision lt foot(admit with wound infection))    Nutrition Interventions:   Food and/or Nutrient Delivery: Continue Current Diet  Nutrition Education/Counseling: No recommendation at this time  Coordination of Nutrition Care: Continue to monitor while inpatient       Goals:   Goals: PO intake 75% or greater       Nutrition Monitoring and Evaluation:   Behavioral-Environmental Outcomes: None Identified  Food/Nutrient Intake Outcomes: Food and Nutrient Intake, Supplement Intake  Physical Signs/Symptoms Outcomes: Biochemical Data, Nutrition Focused Physical Findings, Skin, Weight    Discharge Planning:     Too soon to determine     Vadimmichele Daniel, [address]: [id number]

## 2022-10-05 VITALS
TEMPERATURE: 98 F | BODY MASS INDEX: 25.38 KG/M2 | OXYGEN SATURATION: 95 % | DIASTOLIC BLOOD PRESSURE: 69 MMHG | HEART RATE: 84 BPM | RESPIRATION RATE: 18 BRPM | WEIGHT: 187.39 LBS | SYSTOLIC BLOOD PRESSURE: 134 MMHG | HEIGHT: 72 IN

## 2022-10-05 LAB
ANION GAP SERPL CALCULATED.3IONS-SCNC: 8 MMOL/L (ref 3–16)
BASOPHILS ABSOLUTE: 0.1 K/UL (ref 0–0.2)
BASOPHILS RELATIVE PERCENT: 0.7 %
BUN BLDV-MCNC: 17 MG/DL (ref 7–20)
CALCIUM SERPL-MCNC: 8.8 MG/DL (ref 8.3–10.6)
CHLORIDE BLD-SCNC: 99 MMOL/L (ref 99–110)
CO2: 30 MMOL/L (ref 21–32)
CREAT SERPL-MCNC: 0.9 MG/DL (ref 0.8–1.3)
EOSINOPHILS ABSOLUTE: 0.2 K/UL (ref 0–0.6)
EOSINOPHILS RELATIVE PERCENT: 2 %
GFR AFRICAN AMERICAN: >60
GFR NON-AFRICAN AMERICAN: >60
GLUCOSE BLD-MCNC: 145 MG/DL (ref 70–99)
GLUCOSE BLD-MCNC: 154 MG/DL (ref 70–99)
GLUCOSE BLD-MCNC: 251 MG/DL (ref 70–99)
HCT VFR BLD CALC: 24.9 % (ref 40.5–52.5)
HEMOGLOBIN: 8.2 G/DL (ref 13.5–17.5)
LYMPHOCYTES ABSOLUTE: 1.7 K/UL (ref 1–5.1)
LYMPHOCYTES RELATIVE PERCENT: 13.9 %
MCH RBC QN AUTO: 28.2 PG (ref 26–34)
MCHC RBC AUTO-ENTMCNC: 32.8 G/DL (ref 31–36)
MCV RBC AUTO: 86.1 FL (ref 80–100)
MONOCYTES ABSOLUTE: 0.8 K/UL (ref 0–1.3)
MONOCYTES RELATIVE PERCENT: 7 %
NEUTROPHILS ABSOLUTE: 9.2 K/UL (ref 1.7–7.7)
NEUTROPHILS RELATIVE PERCENT: 76.4 %
PDW BLD-RTO: 14.8 % (ref 12.4–15.4)
PERFORMED ON: ABNORMAL
PERFORMED ON: ABNORMAL
PLATELET # BLD: 511 K/UL (ref 135–450)
PMV BLD AUTO: 7.6 FL (ref 5–10.5)
POTASSIUM REFLEX MAGNESIUM: 3.8 MMOL/L (ref 3.5–5.1)
RBC # BLD: 2.9 M/UL (ref 4.2–5.9)
SODIUM BLD-SCNC: 137 MMOL/L (ref 136–145)
WBC # BLD: 12.1 K/UL (ref 4–11)

## 2022-10-05 PROCEDURE — 97530 THERAPEUTIC ACTIVITIES: CPT

## 2022-10-05 PROCEDURE — 6360000002 HC RX W HCPCS

## 2022-10-05 PROCEDURE — 6370000000 HC RX 637 (ALT 250 FOR IP)

## 2022-10-05 PROCEDURE — 02PYX3Z REMOVAL OF INFUSION DEVICE FROM GREAT VESSEL, EXTERNAL APPROACH: ICD-10-PCS | Performed by: INTERNAL MEDICINE

## 2022-10-05 PROCEDURE — 97535 SELF CARE MNGMENT TRAINING: CPT

## 2022-10-05 PROCEDURE — 80048 BASIC METABOLIC PNL TOTAL CA: CPT

## 2022-10-05 PROCEDURE — 97110 THERAPEUTIC EXERCISES: CPT

## 2022-10-05 PROCEDURE — 6370000000 HC RX 637 (ALT 250 FOR IP): Performed by: INTERNAL MEDICINE

## 2022-10-05 PROCEDURE — 2580000003 HC RX 258: Performed by: INTERNAL MEDICINE

## 2022-10-05 PROCEDURE — 85025 COMPLETE CBC W/AUTO DIFF WBC: CPT

## 2022-10-05 PROCEDURE — 36415 COLL VENOUS BLD VENIPUNCTURE: CPT

## 2022-10-05 PROCEDURE — 2580000003 HC RX 258

## 2022-10-05 RX ORDER — LINEZOLID 600 MG/1
600 TABLET, FILM COATED ORAL EVERY 12 HOURS SCHEDULED
Qty: 54 TABLET | Refills: 0 | DISCHARGE
Start: 2022-10-05 | End: 2022-11-01

## 2022-10-05 RX ORDER — LINEZOLID 600 MG/1
600 TABLET, FILM COATED ORAL EVERY 12 HOURS SCHEDULED
Qty: 54 TABLET | Refills: 0 | Status: SHIPPED | OUTPATIENT
Start: 2022-10-05 | End: 2022-10-05 | Stop reason: SDUPTHER

## 2022-10-05 RX ADMIN — HEPARIN SODIUM 5000 UNITS: 5000 INJECTION INTRAVENOUS; SUBCUTANEOUS at 15:03

## 2022-10-05 RX ADMIN — SODIUM CHLORIDE, PRESERVATIVE FREE 10 ML: 5 INJECTION INTRAVENOUS at 10:27

## 2022-10-05 RX ADMIN — LISINOPRIL 30 MG: 20 TABLET ORAL at 08:40

## 2022-10-05 RX ADMIN — RIFAMPIN 300 MG: 300 CAPSULE ORAL at 08:39

## 2022-10-05 RX ADMIN — LINEZOLID 600 MG: 600 TABLET, FILM COATED ORAL at 08:39

## 2022-10-05 RX ADMIN — SODIUM CHLORIDE, PRESERVATIVE FREE 10 ML: 5 INJECTION INTRAVENOUS at 08:39

## 2022-10-05 RX ADMIN — OXYCODONE 10 MG: 5 TABLET ORAL at 08:46

## 2022-10-05 RX ADMIN — OLANZAPINE 5 MG: 5 TABLET, FILM COATED ORAL at 08:39

## 2022-10-05 RX ADMIN — INSULIN LISPRO 5 UNITS: 100 INJECTION, SOLUTION INTRAVENOUS; SUBCUTANEOUS at 15:03

## 2022-10-05 RX ADMIN — INSULIN LISPRO 4 UNITS: 100 INJECTION, SOLUTION INTRAVENOUS; SUBCUTANEOUS at 15:03

## 2022-10-05 ASSESSMENT — PAIN DESCRIPTION - ORIENTATION: ORIENTATION: LEFT

## 2022-10-05 ASSESSMENT — PAIN DESCRIPTION - LOCATION: LOCATION: FOOT;BACK

## 2022-10-05 ASSESSMENT — PAIN SCALES - GENERAL
PAINLEVEL_OUTOF10: 0
PAINLEVEL_OUTOF10: 0
PAINLEVEL_OUTOF10: 7

## 2022-10-05 ASSESSMENT — PAIN DESCRIPTION - FREQUENCY: FREQUENCY: INTERMITTENT

## 2022-10-05 ASSESSMENT — PAIN DESCRIPTION - DESCRIPTORS: DESCRIPTORS: ACHING

## 2022-10-05 ASSESSMENT — PAIN DESCRIPTION - PAIN TYPE: TYPE: ACUTE PAIN

## 2022-10-05 NOTE — PROGRESS NOTES
Pt ready for discharge. PATRICE completed and AVS printed to send with pt. RN called report to [place] at [facility]. IV and PICC removed per orders and conversation with Dr. Nuria Collins. Packed all pts belongings and will send with transport,  time 1500.

## 2022-10-05 NOTE — CARE COORDINATION
To: [address] today at 1500 by [employer]. Nurse Report:  [phone]:  [phone]  Electronically signed by Fox Cordova RN on 10/5/2022 at 1:34 PM    Addendum:  Spoke to family and they know that patient is discharging today. Sharyle Rands, admissions at [facility] and faxed AVS to her.  Electronically signed by Fox Cordova RN on 10/5/2022 at 1:38 PM

## 2022-10-05 NOTE — PROGRESS NOTES
Podiatric Surgery Daily Progress Note  Kai Dandy      Subjective :   Patient seen and examined this am at the bedside. Patient is s/p I&D with delayed primary closure of the left hallux (DOS 9/30/22). Patient denies any acute overnight events. Patient denies N/V/F/C/SOB. Patient denies calf pain, thigh pain, chest pain. Review of Systems: A 12 point review of symptoms is unremarkable with the exception of the chief complaint. Patient specifically denies nausea, fever, vomiting, chills, shortness of breath, chest pain, abdominal pain, constipation or difficulty urinating. Objective     BP (!) 179/81   Pulse 78   Temp 99.3 °F (37.4 °C) (Axillary)   Resp 18   Ht 6' (1.829 m)   Wt 187 lb 6.3 oz (85 kg)   SpO2 92%   BMI 25.41 kg/m²      I/O:  Intake/Output Summary (Last 24 hours) at 10/5/2022 0832  Last data filed at 10/5/2022 0104  Gross per 24 hour   Intake 720 ml   Output 500 ml   Net 220 ml              Wt Readings from Last 3 Encounters:   10/04/22 187 lb 6.3 oz (85 kg)       LABS:    Recent Labs     10/04/22  1404 10/05/22  0639   WBC 12.3* 12.1*   HGB 8.3* 8.2*   HCT 25.9* 24.9*   * 511*        Recent Labs     10/05/22  0639      K 3.8   CL 99   CO2 30   BUN 17   CREATININE 0.9        No results for input(s): PROT, INR, APTT in the last 72 hours. LOWER EXTREMITY EXAMINATION    Dressing to the left lower extremity was clean dry and intact without strike through noted to the external dressing layers. Sanguinous drainage noted to the internal dressing layers without evidence of purulence. VASCULAR: DP and PT pulses are faintly palpable +1/4 b/l. Upon hand-held Doppler examination, DP and PT pulses were noted to be biphasic bilateral. CFT is brisk to the digits of the foot b/l while the CFT to the left hallux is noted to be slightly sluggish. Skin temperature is warm to cool from proximal to distal on right LE.  Skin temperature is warm to warm from proximal to distal on left with focal calor noted diffusely to the left forefoot. Edema noted diffusely to the left forefoot especially at the left hallux. Erythema noted to the left forefoot that tracks proximally until the level of the distal shin. No pain with calf compression b/l. NEUROLOGIC: Gross and epicritic sensation is diminished b/l. Protective sensation is absent at all pedal sites b/l. DERMATOLOGIC:   Left LE:  Sutures intact to the incision site of the left hallux and 1st webspace. Necrotic eschar noted along the plantar medial aspect of the left hallux incision site. Blanchable dermis noted just distal to the necrotic eschar at site of previous overlying bulla with evidence of epithelialization. Skin edges are well approximated without evidence of dehiscence. No fluctuance, crepitus, malodor, purulence noted. The wounds noted to tunnel, track or probe to bone. Verbal consent was obtained for photos today:          Right lower extremity is a closed soft tissue envelope without evidence of infection or ulceration. MUSCULOSKELETAL: Muscle strength is 5/5 for all pedal groups tested. Mild pain to palpation noted to the left hallux. Ankle joint ROM is decreased in dorsiflexion with the knee extended. No obvious biomechanical abnormalities. IMAGING:  Xray foot; left:  Narrative   XR FOOT LEFT (MIN 3 VIEWS)       Indication: infection, eval for air        COMPARISON: 9/17/2022       Findings: 3 views of the left foot were obtained. Again identified is a fracture of the proximal phalanx of the great toe. There is increased interval displacement of the fracture fragment with rotation laterally. No additional fracture deformity. No    soft tissue gas or acute abnormality. Impression   Impression: Increased interval displacement of the previously noted fracture fragment of the great toe. No discrete soft tissue gas.       CT foot; left:  Narrative   CT FOOT LEFT WO CONTRAST       Indication: Left hallux infection       Technique: Helical CT images of the left foot were acquired without the administration of intravenous contrast media. Axial, coronal and sagittal reformatted images were constructed from the raw data set. Individualized dose optimization technique was    used in order to meet ALARA standards for radiation dose reduction. In addition to vendor specific dose reduction algorithms, the dose reduction techniques vary based on the specific scanner utilized but frequently include automated exposure control,    adjustment of the mA and/or kV according to patient size, and use of iterative reconstruction technique. COMPARISON: 9/17/2022. Findings: Again identified is fracture at the lateral aspect of the proximal phalanx of the left great toe. Small locules of gas are visualized adjacent to the fracture fragment. Adjacent soft tissue swelling of the left great toe is noted. Bandage    material overlies the plantar aspect of the great toe. No additional fracture deformity. No organized or drainable fluid collection. Impression   Impression: Small locules of soft tissue gas adjacent to the major fracture fragment of the left great toe. Correlation for open fracture versus soft tissue infection recommended. IMPRESSION/RECOMMENDATIONS:    -s/p left foot I&D with Delayed Primary closure (DOS 9/30/22)  -S/P left foot I&D (DOS 9/27/22)  -Diabetic foot infection, hallux 2/2 traumatic open fracture-resolved  -Diabetes mellitus type 2 with peripheral neuropathy     -Patient examined and evaluated at the bedside   -Hypertensive otherwise VSS.   Leukocytosis noted (WBC 12.1)  -ESR 90 .6  -HbA1c 6.2  -X-ray and CT scan reviewed, impressions noted above  -Blood cultures 1 and 2 NGTD  -Wound culture (9/26): MRSA and Corynebacterium stratum  -Surgical tissue culture (9/27): MRSA and Enterococcus faecalis  -Sugical Path: Portion of proximal phalanx with acute osteomyelitis and degenerative changes  -Continue IV antibiotics per ID recommendations  -ID following; recommend daptomycin through 10/26, PICC line placed 10/3/22.   -Partial heel weightbearing to left lower extremity in surgical shoe with assistive device    DISPO: S/P incision and drainage with delayed primary closure of left foot (DOS 9/30/22). All labs and imaging reviewed. ID following; recommendations noted above. Patient is okay to discharge from podiatric standpoint pending medical clearance. Patient would benefit from further outpatient wound care following discharge. Will continue with local wound care while in house.      Discussed assessment and plan with Dr. Justin Sinha DPM.    Deepak Evangelista DPM   Podiatric Resident PGY2  Pager [phone] or [employer]  10/5/2022, 8:34 AM

## 2022-10-05 NOTE — CARE COORDINATION
Case Management Assessment            Discharge Note                    Date / Time of Note: 10/5/2022 1:25 PM                  Discharge Note Completed by: Areli Can RN    Patient Name: Sandi Carrillo   YOB: 1955  Diagnosis: Wound infection, posttraumatic [T14. 8XXA, L08.9]   Date / Time: 9/26/2022  1:17 PM    Current PCP: Nikolai Bhatt DO  Clinic patient: No    Hospitalization in the last 30 days: No    Advance Directives:  Code Status: Full Code  [place] DNR form completed and on chart: Not Indicated    Financial:  Payor: MEDICARE / Plan: MEDICARE PART A AND B / Product Type: *No Product type* /      Pharmacy:    Lucio Matias, [address] [place] [address] [phone] - F [phone]  [address] [place] [address]  Phone: [phone] Fax: [phone] [place], [address] [facility] [place]. [facility] [phone] -  [phone]  [facility] [address] [place] [address]  Phone: [phone] Fax: [phone]      Assistance purchasing medications?: Potential Assistance Purchasing Medications: No  Assistance provided by Case Management: None at this time    Does patient want to participate in local refill/ meds to beds program?: No    Meds To Beds General Rules:  1. Can ONLY be done Monday- Friday between 8:30am-5pm  2. Prescription(s) must be in pharmacy by 3pm to be filled same day  3. Copy of patient's insurance/ prescription drug card and patient face sheet must be sent along with the prescription(s)  4. Cost of Rx cannot be added to hospital bill. If financial assistance is needed, please contact unit  or ;  or  CANNOT provide pharmacy voucher for patients co-pays  5.  Patients can then  the prescription on their way out of the hospital at discharge, or pharmacy can deliver to the bedside if staff is available. (payment due at time of pick-up or delivery - cash, check, or card accepted)     Able to afford home medications/ co-pay costs: SNF    ADLS:  Current PT AM-PAC Score: 12 /24  Current OT AM-PAC Score: 14 /24      DISCHARGE Disposition: Sudha Sheridan   Phone: [phone]     Report:  [phone]:  [phone]    LOC at discharge: Skilled  PATRICE Completed: Yes    Notification completed in [facility]/PAS?: [address] [place] [address]  [phone]      Transportation:  Transportation PLAN for discharge: EMS transportation   Mode of Transport: Ambulance stretcher - BLS  Reason for medical transport: Severe muscular weakness and de-conditioned state due to deconditioning and requires ambulance transport due to deconditioning  Name of [address]: [facility]  Phone: [phone]  Time of Transport: 1500    Transport form completed: Yes    Home Care:  Home Care ordered at discharge: Not Indicated    Additional CM Notes: To [address] [place] [address]-SNF at  1500 today by Ambulance   Report:  [phone]:  [phone]    The Plan for Transition of Care is related to the following treatment goals of Wound infection, posttraumatic [T14. 8XXA, L08.9]    The Patient and/or patient representative Mele Reece and his family were provided with a choice of provider and agrees with the discharge plan Yes    Freedom of choice list was provided with basic dialogue that supports the patient's individualized plan of care/goals and shares the quality data associated with the providers.  Yes    Care Transitions patient: No    Rocio Sidhu RN  [facility]  Case Management Department  Ph: [phone]

## 2022-10-05 NOTE — PROGRESS NOTES
Physical Therapy  Facility/Department: [address]  Daily Treatment Note  NAME: Lauren Gallegos  : 1955  MRN: [id number]    Date of Service: 10/5/2022    Discharge Recommendations:  Lauren Gallegos scored a 12/24 on the AM-PAC short mobility form. Current research shows that an AM-PAC score of 17 or less is typically not associated with a discharge to the patient's home setting. Based on the patient's AM-PAC score and their current functional mobility deficits, it is recommended that the patient have 3-5 sessions per week of Physical Therapy at d/c to increase the patient's independence. Please see assessment section for further patient specific details. If patient discharges prior to next session this note will serve as a discharge summary. Please see below for the latest assessment towards goals. PT Equipment Recommendations  Equipment Needed:  (defer to next level of care)    Patient Diagnosis(es): The primary encounter diagnosis was Wound infection, posttraumatic. Diagnoses of Gangrene of left foot ([address] MRSA infection, and Abscess of left foot were also pertinent to this visit. Assessment   Assessment: Krzysztof Burnett tolerated therapy session wtih no adverse events. He continues to require 2 person assist to complete basic funtional txfs. He is limited 2/2 global weakness, deconditioning & WB'ing prec's. +time to complete all mobility tasks. Recommend continued skilled PT services during pt's hospital stay & upon medical clearnace. Activity Tolerance: Patient limited by endurance; Patient limited by fatigue  Equipment Needed:  (defer to next level of care)     Plan    Physcial Therapy Plan  General Plan:  (2-5 times per week)  Current Treatment Recommendations: Strengthening;Balance training;Gait training;Functional mobility training;Transfer training; Endurance training; Safety education & training;Patient/Caregiver education & training; Therapeutic activities     Restrictions  Position Activity Restriction  Other position/activity restrictions: Partial Weight Bearing to heel touch only in surgical shoe only to left lower extremity     Subjective    Subjective  Subjective: Pt agreeable to PT tx. Pain: Pt with no c/o pain  Orientation  Overall Orientation Status: Impaired  Orientation Level: Oriented to person (Pt disoriented to place & time despite choices.)  Cognition  Overall Cognitive Status: Exceptions  Following Commands: Follows one step commands with increased time  Safety Judgement: Decreased awareness of need for safety;Decreased awareness of need for assistance  Insights: Decreased awareness of deficits  Initiation: Requires cues for some  Sequencing: Requires cues for some  Cognition Comment: flat affect     Objective   Vitals     Bed Mobility Training  Bed Mobility Training: Yes  Supine to Sit: Stand-by assistance; Additional time  Balance  Sitting:  (SBA at EOB)  Standing:  (2 person assist with RW)  Transfer Training  Transfer Training: Yes  Interventions: Verbal cues; Tactile cues  Sit to Stand: Maximum assistance;Assist X2;Adaptive equipment (up to RW)  Stand to Sit: Maximum assistance;Assist X2;Adaptive equipment (with cues for positioning & hand placement.)  Bed to Chair: Maximum assistance;Assist X2;Additional time; Adaptive equipment (wtih RW. Pt completed txf via a step pivot with inc'd trunk flexion, knee flexion & dec'd B step length. +time to complete & effortful. Difficulty noted in adhering to [address] prec's)  Gait Training  Gait Training: No  Wheelchair Management  Wheelchair Management: No     PT Exercises  Exercise Treatment: Pt participated in the following BLE seated HEP: hip flexion & LAQs. 2 sets x 10 repetitions each. VCs, TCs for improved therex technique. Slow & effortful to complete     Safety Devices  Type of Devices: Nurse notified; Chair alarm in place;Call light within reach; Left in chair;Telesitter in use  Restraints  Restraints Initially in Place: No Goals  Short Term Goals  Time Frame for Short Term Goals: discharge  ongoing 10/3  Short Term Goal 1: patient will perform bed mobility with SBA  Short Term Goal 2: patient will perform transfers sit<>stand with min assist x 1  Short Term Goal 3: patient will perform transfers bed<>chair with min assist x 1  Short Term Goal 4: patient will ambulate 5' with FWW and min assist x 1 maintaining NWB on LLE  Patient Goals   Patient Goals : none stated    Education  Patient Education  Education Given To: Patient  Education Provided: Role of Therapy;Transfer Training;Home Exercise Program  Education Method: Verbal  Barriers to Learning: Cognition  Education Outcome: Continued education needed    Therapy Time   Individual Concurrent Group Co-treatment   Time In       1137   Time Out       1216   Minutes       39   Timed Code Treatment Minutes: Michelle 60, PT

## 2022-10-05 NOTE — PROGRESS NOTES
Occupational Therapy  Facility/Department: [address]  Daily Treatment Note  NAME: Sumaya Uribe  : 1955  MRN: [id number]    Date of Service: 10/5/2022    Discharge Recommendations: Sumaya Uribe scored a 14/24 on the AM-PAC ADL Inpatient form. Current research shows that an AM-PAC score of 17 or less is typically not associated with a discharge to the patient's home setting. Based on the patient's AM-PAC score and their current ADL deficits, it is recommended that the patient have 3-5 sessions per week of Occupational Therapy at d/c to increase the patient's independence. Please see assessment section for further patient specific details. If patient discharges prior to next session this note will serve as a discharge summary. Please see below for the latest assessment towards goals. OT Equipment Recommendations  Other: defer to next level of care    Patient Diagnosis(es): The primary encounter diagnosis was Wound infection, posttraumatic. Diagnoses of Gangrene of left foot ([address] MRSA infection, and Abscess of left foot were also pertinent to this visit. Assessment    Assessment: Pt progressing towards goals this date. Pt performs bed mobility with SBA. Pt currently requiring Max Ax2 for functional transfers and mobility, with questionable adherence to partial WB status on LLE. Pt requiring Max A for footwear and toileting this date, set-up and cueing for feeding. Pt continues to be limited by weight bearing status, decreased endurance, and impaired vision. Pt requires max cueing and additional time for tasks throughout session. Recommend SNF for continued inpatient OT services. Will follow on OT POC. Activity Tolerance: Patient limited by endurance; Patient limited by fatigue  Other: defer to next level of care      Plan   Occupational Therapy Plan  Times Per Week: 2-5  Current Treatment Recommendations: Balance training;Functional mobility training; Endurance training;Gait training;Cognitive reorientation; Safety education & training;Patient/Caregiver education & training;Equipment evaluation, education, & procurement;Self-Care / ADL; Strengthening     Restrictions  Partial Weight Bearing to heel touch only in surgical shoe only to left lower extremity  Up with assist    Subjective   Subjective  Subjective: Pt met reclined in bed, asking when lunch would be arriving. Pt agreeable to OT session. Orientation  Overall Orientation Status: Impaired  Orientation Level: Oriented to person (Pt disoriented to place & time despite choices.)  Pain: Pt with no c/o pain  Cognition  Overall Cognitive Status: Exceptions  Following Commands: Follows one step commands with increased time  Safety Judgement: Decreased awareness of need for safety;Decreased awareness of need for assistance  Insights: Decreased awareness of deficits  Initiation: Requires cues for some  Sequencing: Requires cues for some  Cognition Comment: flat affect- poor adherence to WB status and remembering precautions, pt has dementia at baseline        Objective    Bed Mobility Training  Bed Mobility Training: Yes  Supine to Sit: Stand-by assistance; Additional time  Balance  Sitting:  (SBA at EOB ~3 min)  Standing:  (2 person assist with RW)  Transfer Training  Transfer Training: Yes  Interventions: Verbal cues; Tactile cues  Sit to Stand: Maximum assistance;Assist X2;Adaptive equipment (up to RW)  Stand to Sit: Maximum assistance;Assist X2;Adaptive equipment (with cues for positioning & hand placement.)  Bed to Chair: Maximum assistance;Assist X2;Additional time; Adaptive equipment (with RW. Pt completed transfer via a step pivot, additional time to complete & effortful. Difficulty noted in adhering to [address] prec's)     ADL  Feeding: Setup; Increased time to complete; Other (Comment)  Feeding Skilled Clinical Factors: Cues for food/beverage placement due to impaired vision.  Pt demonstrated ability to bring food and drink to mouth after set-up and cues with additional time. LE Dressing: Maximum assistance;Verbal cueing; Increased time to complete  LE Dressing Skilled Clinical Factors: Max A to don L surgical shoe; Pt lifted leg into shoe for placement from bed level  Toileting: Increased time to complete;Setup;Maximum assistance  Toileting Skilled Clinical Factors: Max A for toileting hygiene and brief management. Pt stood at [facility] for hygiene and new brief. Additional Comments: Max verbal cueing for impaired vision and additional time with ADLs    OT Exercises  Exercise Treatment: Pt seated in recliner chair, performed: chest press x10, shoulder shrugs x10, bicep curls x10. Pt required verbal and visual cueing, increased time to perform     Safety Devices  Type of Devices: Nurse notified; Chair alarm in place;Call light within reach; Left in chair;Telesitter in use;Gait belt  Restraints  Restraints Initially in Place: No     Patient Education  Education Given To: Patient  Education Provided: Plan of Care;Role of Therapy;Transfer Training;Precautions;Orientation  Education Method: Verbal  Barriers to Learning: Cognition;Vision  Education Outcome: Continued education needed (continue to educate re: partial WB on heel precautions)    Goals  Short Term Goals  Time Frame for Short Term Goals: by D/C  Short Term Goal 1: Maintain sitting balance 20 min with spvn - Not met  Short Term Goal 2: Perform all grooming tasks mod I, set up - Not met  Short Term Goal 3: Static stance 2 min with CGA  - Progressing  Short Term Goal 4: Transfer to/from chair/BSC with min assist - Not met     Therapy Time   Individual Concurrent Group Co-treatment   Time In 1137         Time Out 1217         Minutes 40         Timed Code Treatment Minutes: [address], S/OT  Therapist was present, directed the patient's care, made skilled judgement, and was responsible for assessment and treatment of the patient.    Fabricio Delarosa, OTR/L

## 2022-10-05 NOTE — PLAN OF CARE
Problem: Safety - Adult  Goal: Free from fall injury  Note: Bed locked in lowest position. Impulsive at times but able to re-direct. Ambulate with ayush mendoza and 2 person assist. 3/4 Bed rails up. Call light within reach. Pt belongings within reach. Bedside table within reach. Pt instructed to use call light prior to getting up. Will continue to monitor. Problem: Pain  Goal: Verbalizes/displays adequate comfort level or baseline comfort level  Flowsheets (Taken 10/5/2022 0300)  Verbalizes/displays adequate comfort level or baseline comfort level: Encourage patient to monitor pain and request assistance  Note: Denies pain during this shift. Tylenol available if needed.

## 2022-10-07 NOTE — PROGRESS NOTES
Physician Progress Note      PATIENT:               Claudell Ped  CSN #:                  [id number]  :                       1955  ADMIT DATE:       2022 1:17 PM  [address] [place] DATE:        10/5/2022 4:25 PM  RESPONDING  PROVIDER #:        Tamika Mortensen MD          QUERY TEXT:    Pt admitted with sepsis and left Hallux infection. Per previous query answer:   clinical correlation: Pulmonary edema due to fluid overload, unlikely   pneumonia  If possible, please further specify the acuity of pulmonary edema,   whether the pulmonary edema is acute or chronic. The medical record reflects the following:  Risk Factors: 74 y.o. male with HTN, JADE, DM, infected diabetic foot ulcer,   fluid overload  Clinical Indicators: per  rapid response: became hypoxic with O2 sat   dropping around 88, then to the 50s,  Patient also became confused and was   complaining of SOB. CXR  showed bilateral peribronchial thickening. Treatment: IV Lasix, fluids stopped, supplemental O2  Options provided:  -- Acute pulmonary edema  -- Chronic pulmonary edema  -- Other - I will add my own diagnosis  -- Disagree - Not applicable / Not valid  -- Disagree - Clinically unable to determine / Unknown  -- Refer to Clinical Documentation Reviewer    PROVIDER RESPONSE TEXT:    This patient has acute pulmonary edema.     Query created by: Caitlin Chong on 10/5/2022 6:51 AM      Electronically signed by:  Tamika Mortensen MD 10/7/2022 1:31 PM

## 2022-10-10 ENCOUNTER — TELEPHONE (OUTPATIENT)
Dept: INTERNAL MEDICINE CLINIC | Age: 67
End: 2022-10-10

## 2022-10-10 ENCOUNTER — PHARMACY VISIT (OUTPATIENT)
Dept: INFECTIOUS DISEASES | Age: 67
End: 2022-10-10

## 2022-10-10 DIAGNOSIS — L02.612 ABSCESS OF LEFT FOOT: Primary | ICD-10-CM

## 2022-10-10 NOTE — PROGRESS NOTES
Dr. Dariusz Reyes has placed a referral order for pharmacist to manage Outpatient Parental Antimicrobial Therapy (OPAT) pursuant the ID Collaborative Practice Agreement. However, due to cost and appropriate surgical management, pt was discharged on PO abx. Pertinent Objective Data: Wt Readings from Last 1 Encounters:   10/04/22 187 lb 6.3 oz (85 kg)      BMI Readings from Last 1 Encounters:   10/04/22 25.41 kg/m²      Serum creatinine: 0.9 mg/dL 10/05/22 0639  Estimated creatinine clearance: 90 mL/min    Lab Results   Component Value Date     10/05/2022    K 3.8 10/05/2022    CL 99 10/05/2022    CO2 30 10/05/2022    BUN 17 10/05/2022    CREATININE 0.9 10/05/2022    GLUCOSE 154 (H) 10/05/2022    CALCIUM 8.8 10/05/2022    PROT 7.5 09/26/2022    LABALBU 3.6 09/26/2022    BILITOT <0.2 09/26/2022    ALKPHOS 99 09/26/2022    AST 17 09/26/2022    ALT 17 09/26/2022    LABGLOM >60 10/05/2022    GFRAA >60 10/05/2022    AGRATIO 0.9 (L) 09/26/2022       Lab Results   Component Value Date    WBC 12.1 (H) 10/05/2022    HGB 8.2 (L) 10/05/2022    HCT 24.9 (L) 10/05/2022    MCV 86.1 10/05/2022     (H) 10/05/2022    LYMPHOPCT 13.9 10/05/2022    RBC 2.90 (L) 10/05/2022    MCH 28.2 10/05/2022    MCHC 32.8 10/05/2022    RDW 14.8 10/05/2022       Lab Results   Component Value Date    .6 (H) 09/26/2022       Lab Results   Component Value Date    SEDRATE 90 (H) 09/26/2022       Lab Results   Component Value Date    CKTOTAL 155 10/04/2022       Pt will be on PO linezolid and PO rifampin until 10/26 per original PATRICE (approx. 4 weeks). Discharge AVS states until 11/1. Will monitor labs as the course progresses.        Thank you,  Hadley Gary, PharmD, [address] [place], [place] Infectious Disease  Phone: [phone] (also available on [employer])  Fax: [phone]    For Pharmacy [address] [place] [address] in place:  Yes  Time Spent (min): 15

## 2022-10-10 NOTE — TELEPHONE ENCOUNTER
Nurse Manager from [address]  called wanting to set up post op visit with Dr. Herminia Park. Dr. Herminia Park not in clinic this week . I spoke to Dr. Herminia Park. He states he will call [address] today or tomorrow and set up an appointment with him in his private office. Nurse Manager MARCELLA Made aware. I also gave him Dr. Veronica Graham office number incase he doesn't here from him by tomorrow. OLIVIA here for transport. Belongings with pt. VSS. pain subsiding. Remains NPO.      Alvin Schultz RN  07/28/21 [id number]

## 2022-10-14 ENCOUNTER — APPOINTMENT (OUTPATIENT)
Dept: GENERAL RADIOLOGY | Facility: HOSPITAL | Age: 67
End: 2022-10-14

## 2022-10-14 ENCOUNTER — HOSPITAL ENCOUNTER (INPATIENT)
Facility: HOSPITAL | Age: 67
LOS: 33 days | Discharge: NURSING, REHAB OR LONG-TERM CARE | End: 2022-11-16
Attending: EMERGENCY MEDICINE | Admitting: INTERNAL MEDICINE

## 2022-10-14 ENCOUNTER — APPOINTMENT (OUTPATIENT)
Dept: MRI IMAGING | Facility: HOSPITAL | Age: 67
End: 2022-10-14

## 2022-10-14 DIAGNOSIS — M86.9 OSTEOMYELITIS OF TOE OF LEFT FOOT: Primary | ICD-10-CM

## 2022-10-14 DIAGNOSIS — M86.172 ACUTE OSTEOMYELITIS OF LEFT FOOT: ICD-10-CM

## 2022-10-14 DIAGNOSIS — D64.9 ANEMIA, UNSPECIFIED TYPE: ICD-10-CM

## 2022-10-14 DIAGNOSIS — R73.9 HYPERGLYCEMIA: ICD-10-CM

## 2022-10-14 PROBLEM — E11.9 TYPE 2 DIABETES MELLITUS: Status: ACTIVE | Noted: 2022-10-14

## 2022-10-14 PROBLEM — F29 PSYCHOTIC DISORDER: Status: ACTIVE | Noted: 2022-10-14

## 2022-10-14 PROBLEM — I10 ESSENTIAL HYPERTENSION: Status: ACTIVE | Noted: 2022-10-14

## 2022-10-14 LAB
ALBUMIN SERPL-MCNC: 3.6 G/DL (ref 3.5–5.2)
ALBUMIN/GLOB SERPL: 0.8 G/DL
ALP SERPL-CCNC: 115 U/L (ref 39–117)
ALT SERPL W P-5'-P-CCNC: 17 U/L (ref 1–41)
ANION GAP SERPL CALCULATED.3IONS-SCNC: 11 MMOL/L (ref 5–15)
AST SERPL-CCNC: 15 U/L (ref 1–40)
BASOPHILS # BLD AUTO: 0.03 10*3/MM3 (ref 0–0.2)
BASOPHILS NFR BLD AUTO: 0.4 % (ref 0–1.5)
BILIRUB SERPL-MCNC: <0.2 MG/DL (ref 0–1.2)
BUN SERPL-MCNC: 17 MG/DL (ref 8–23)
BUN/CREAT SERPL: 14 (ref 7–25)
CALCIUM SPEC-SCNC: 9.4 MG/DL (ref 8.6–10.5)
CHLORIDE SERPL-SCNC: 100 MMOL/L (ref 98–107)
CO2 SERPL-SCNC: 28 MMOL/L (ref 22–29)
CREAT SERPL-MCNC: 1.21 MG/DL (ref 0.76–1.27)
CRP SERPL-MCNC: 3.07 MG/DL (ref 0–0.5)
D-LACTATE SERPL-SCNC: 1.7 MMOL/L (ref 0.5–2)
D-LACTATE SERPL-SCNC: 2.3 MMOL/L (ref 0.5–2)
DEPRECATED RDW RBC AUTO: 44.4 FL (ref 37–54)
EGFRCR SERPLBLD CKD-EPI 2021: 66.4 ML/MIN/1.73
EOSINOPHIL # BLD AUTO: 0.2 10*3/MM3 (ref 0–0.4)
EOSINOPHIL NFR BLD AUTO: 2.8 % (ref 0.3–6.2)
ERYTHROCYTE [DISTWIDTH] IN BLOOD BY AUTOMATED COUNT: 13.4 % (ref 12.3–15.4)
ERYTHROCYTE [SEDIMENTATION RATE] IN BLOOD: 67 MM/HR (ref 0–20)
GLOBULIN UR ELPH-MCNC: 4.8 GM/DL
GLUCOSE SERPL-MCNC: 185 MG/DL (ref 65–99)
HCT VFR BLD AUTO: 29.4 % (ref 37.5–51)
HGB BLD-MCNC: 9.2 G/DL (ref 13–17.7)
HOLD SPECIMEN: NORMAL
IMM GRANULOCYTES # BLD AUTO: 0.03 10*3/MM3 (ref 0–0.05)
IMM GRANULOCYTES NFR BLD AUTO: 0.4 % (ref 0–0.5)
INR PPP: 1.02 (ref 0.84–1.13)
LYMPHOCYTES # BLD AUTO: 1.46 10*3/MM3 (ref 0.7–3.1)
LYMPHOCYTES NFR BLD AUTO: 20.4 % (ref 19.6–45.3)
MCH RBC QN AUTO: 28.5 PG (ref 26.6–33)
MCHC RBC AUTO-ENTMCNC: 31.3 G/DL (ref 31.5–35.7)
MCV RBC AUTO: 91 FL (ref 79–97)
MONOCYTES # BLD AUTO: 0.47 10*3/MM3 (ref 0.1–0.9)
MONOCYTES NFR BLD AUTO: 6.6 % (ref 5–12)
NEUTROPHILS NFR BLD AUTO: 4.98 10*3/MM3 (ref 1.7–7)
NEUTROPHILS NFR BLD AUTO: 69.4 % (ref 42.7–76)
NRBC BLD AUTO-RTO: 0 /100 WBC (ref 0–0.2)
PLATELET # BLD AUTO: 538 10*3/MM3 (ref 140–450)
PMV BLD AUTO: 9.3 FL (ref 6–12)
POTASSIUM SERPL-SCNC: 4.9 MMOL/L (ref 3.5–5.2)
PROCALCITONIN SERPL-MCNC: 0.05 NG/ML (ref 0–0.25)
PROT SERPL-MCNC: 8.4 G/DL (ref 6–8.5)
PROTHROMBIN TIME: 13.3 SECONDS (ref 11.4–14.4)
RBC # BLD AUTO: 3.23 10*6/MM3 (ref 4.14–5.8)
SODIUM SERPL-SCNC: 139 MMOL/L (ref 136–145)
WBC NRBC COR # BLD: 7.17 10*3/MM3 (ref 3.4–10.8)
WHOLE BLOOD HOLD COAG: NORMAL
WHOLE BLOOD HOLD SPECIMEN: NORMAL

## 2022-10-14 PROCEDURE — 86901 BLOOD TYPING SEROLOGIC RH(D): CPT | Performed by: NURSE PRACTITIONER

## 2022-10-14 PROCEDURE — 36415 COLL VENOUS BLD VENIPUNCTURE: CPT

## 2022-10-14 PROCEDURE — 85610 PROTHROMBIN TIME: CPT | Performed by: EMERGENCY MEDICINE

## 2022-10-14 PROCEDURE — 99284 EMERGENCY DEPT VISIT MOD MDM: CPT

## 2022-10-14 PROCEDURE — 87040 BLOOD CULTURE FOR BACTERIA: CPT | Performed by: EMERGENCY MEDICINE

## 2022-10-14 PROCEDURE — 86850 RBC ANTIBODY SCREEN: CPT | Performed by: NURSE PRACTITIONER

## 2022-10-14 PROCEDURE — 87077 CULTURE AEROBIC IDENTIFY: CPT | Performed by: NURSE PRACTITIONER

## 2022-10-14 PROCEDURE — 83605 ASSAY OF LACTIC ACID: CPT | Performed by: EMERGENCY MEDICINE

## 2022-10-14 PROCEDURE — 80053 COMPREHEN METABOLIC PANEL: CPT | Performed by: EMERGENCY MEDICINE

## 2022-10-14 PROCEDURE — 87070 CULTURE OTHR SPECIMN AEROBIC: CPT | Performed by: NURSE PRACTITIONER

## 2022-10-14 PROCEDURE — 84145 PROCALCITONIN (PCT): CPT | Performed by: EMERGENCY MEDICINE

## 2022-10-14 PROCEDURE — 25010000002 CEFEPIME PER 500 MG: Performed by: EMERGENCY MEDICINE

## 2022-10-14 PROCEDURE — 99223 1ST HOSP IP/OBS HIGH 75: CPT | Performed by: INTERNAL MEDICINE

## 2022-10-14 PROCEDURE — 87205 SMEAR GRAM STAIN: CPT | Performed by: NURSE PRACTITIONER

## 2022-10-14 PROCEDURE — 87641 MR-STAPH DNA AMP PROBE: CPT | Performed by: NURSE PRACTITIONER

## 2022-10-14 PROCEDURE — 86140 C-REACTIVE PROTEIN: CPT | Performed by: EMERGENCY MEDICINE

## 2022-10-14 PROCEDURE — 25010000002 VANCOMYCIN 10 G RECONSTITUTED SOLUTION: Performed by: EMERGENCY MEDICINE

## 2022-10-14 PROCEDURE — 73630 X-RAY EXAM OF FOOT: CPT

## 2022-10-14 PROCEDURE — 85025 COMPLETE CBC W/AUTO DIFF WBC: CPT | Performed by: EMERGENCY MEDICINE

## 2022-10-14 PROCEDURE — 87186 SC STD MICRODIL/AGAR DIL: CPT | Performed by: NURSE PRACTITIONER

## 2022-10-14 PROCEDURE — 85652 RBC SED RATE AUTOMATED: CPT | Performed by: EMERGENCY MEDICINE

## 2022-10-14 PROCEDURE — 73718 MRI LOWER EXTREMITY W/O DYE: CPT

## 2022-10-14 PROCEDURE — 86900 BLOOD TYPING SEROLOGIC ABO: CPT | Performed by: NURSE PRACTITIONER

## 2022-10-14 RX ORDER — NICOTINE POLACRILEX 4 MG
15 LOZENGE BUCCAL
Status: DISCONTINUED | OUTPATIENT
Start: 2022-10-14 | End: 2022-11-16 | Stop reason: HOSPADM

## 2022-10-14 RX ORDER — DEXTROSE MONOHYDRATE 25 G/50ML
25 INJECTION, SOLUTION INTRAVENOUS
Status: DISCONTINUED | OUTPATIENT
Start: 2022-10-14 | End: 2022-11-16 | Stop reason: HOSPADM

## 2022-10-14 RX ORDER — ACETAMINOPHEN 650 MG/1
650 SUPPOSITORY RECTAL EVERY 4 HOURS PRN
Status: DISCONTINUED | OUTPATIENT
Start: 2022-10-14 | End: 2022-10-18 | Stop reason: SDUPTHER

## 2022-10-14 RX ORDER — ACETAMINOPHEN 325 MG/1
650 TABLET ORAL EVERY 4 HOURS PRN
Status: DISCONTINUED | OUTPATIENT
Start: 2022-10-14 | End: 2022-10-18 | Stop reason: SDUPTHER

## 2022-10-14 RX ORDER — INSULIN LISPRO 100 [IU]/ML
0-7 INJECTION, SOLUTION INTRAVENOUS; SUBCUTANEOUS
Status: DISCONTINUED | OUTPATIENT
Start: 2022-10-15 | End: 2022-11-05

## 2022-10-14 RX ORDER — ACETAMINOPHEN 160 MG/5ML
650 SOLUTION ORAL EVERY 4 HOURS PRN
Status: DISCONTINUED | OUTPATIENT
Start: 2022-10-14 | End: 2022-10-18 | Stop reason: SDUPTHER

## 2022-10-14 RX ORDER — SODIUM CHLORIDE 0.9 % (FLUSH) 0.9 %
10 SYRINGE (ML) INJECTION AS NEEDED
Status: DISCONTINUED | OUTPATIENT
Start: 2022-10-14 | End: 2022-11-16 | Stop reason: HOSPADM

## 2022-10-14 RX ORDER — SODIUM CHLORIDE 0.9 % (FLUSH) 0.9 %
10 SYRINGE (ML) INJECTION EVERY 12 HOURS SCHEDULED
Status: DISCONTINUED | OUTPATIENT
Start: 2022-10-14 | End: 2022-11-03 | Stop reason: SDUPTHER

## 2022-10-14 RX ORDER — SODIUM CHLORIDE 0.9 % (FLUSH) 0.9 %
10 SYRINGE (ML) INJECTION AS NEEDED
Status: DISCONTINUED | OUTPATIENT
Start: 2022-10-14 | End: 2022-11-03 | Stop reason: SDUPTHER

## 2022-10-14 RX ORDER — SODIUM CHLORIDE 0.9 % (FLUSH) 0.9 %
10 SYRINGE (ML) INJECTION AS NEEDED
Status: DISCONTINUED | OUTPATIENT
Start: 2022-10-14 | End: 2022-10-26

## 2022-10-14 RX ADMIN — CEFEPIME HYDROCHLORIDE 2 G: 2 INJECTION, POWDER, FOR SOLUTION INTRAMUSCULAR; INTRAVENOUS at 21:16

## 2022-10-14 RX ADMIN — SODIUM CHLORIDE 500 ML: 9 INJECTION, SOLUTION INTRAVENOUS at 21:58

## 2022-10-14 RX ADMIN — VANCOMYCIN HYDROCHLORIDE 1750 MG: 10 INJECTION, POWDER, LYOPHILIZED, FOR SOLUTION INTRAVENOUS at 21:57

## 2022-10-15 LAB
ABO GROUP BLD: NORMAL
ABO GROUP BLD: NORMAL
APTT PPP: 48.7 SECONDS (ref 22–39)
BLD GP AB SCN SERPL QL: NEGATIVE
GLUCOSE BLDC GLUCOMTR-MCNC: 182 MG/DL (ref 70–130)
GLUCOSE BLDC GLUCOMTR-MCNC: 228 MG/DL (ref 70–130)
GLUCOSE BLDC GLUCOMTR-MCNC: 230 MG/DL (ref 70–130)
GLUCOSE BLDC GLUCOMTR-MCNC: 254 MG/DL (ref 70–130)
MRSA DNA SPEC QL NAA+PROBE: NEGATIVE
RH BLD: POSITIVE
RH BLD: POSITIVE
T&S EXPIRATION DATE: NORMAL

## 2022-10-15 PROCEDURE — 63710000001 INSULIN LISPRO (HUMAN) PER 5 UNITS: Performed by: NURSE PRACTITIONER

## 2022-10-15 PROCEDURE — 82962 GLUCOSE BLOOD TEST: CPT

## 2022-10-15 PROCEDURE — 86901 BLOOD TYPING SEROLOGIC RH(D): CPT

## 2022-10-15 PROCEDURE — 25010000002 VANCOMYCIN PER 500 MG

## 2022-10-15 PROCEDURE — 85730 THROMBOPLASTIN TIME PARTIAL: CPT | Performed by: NURSE PRACTITIONER

## 2022-10-15 PROCEDURE — 99221 1ST HOSP IP/OBS SF/LOW 40: CPT | Performed by: ORTHOPAEDIC SURGERY

## 2022-10-15 PROCEDURE — 86900 BLOOD TYPING SEROLOGIC ABO: CPT

## 2022-10-15 PROCEDURE — 99232 SBSQ HOSP IP/OBS MODERATE 35: CPT | Performed by: FAMILY MEDICINE

## 2022-10-15 PROCEDURE — 25010000002 CEFEPIME PER 500 MG: Performed by: NURSE PRACTITIONER

## 2022-10-15 PROCEDURE — 25010000002 HALOPERIDOL LACTATE PER 5 MG: Performed by: NURSE PRACTITIONER

## 2022-10-15 RX ORDER — RIFAMPIN 300 MG/1
300 CAPSULE ORAL 2 TIMES DAILY
COMMUNITY
Start: 2022-10-06 | End: 2022-11-16 | Stop reason: HOSPADM

## 2022-10-15 RX ORDER — ACETAMINOPHEN 325 MG/1
650 TABLET ORAL EVERY 8 HOURS PRN
Status: ON HOLD | COMMUNITY
End: 2022-10-21 | Stop reason: SDUPTHER

## 2022-10-15 RX ORDER — LINEZOLID 600 MG/1
600 TABLET, FILM COATED ORAL 2 TIMES DAILY
COMMUNITY
Start: 2022-10-06 | End: 2022-11-16 | Stop reason: HOSPADM

## 2022-10-15 RX ORDER — OLANZAPINE 5 MG/1
5 TABLET ORAL 2 TIMES DAILY
COMMUNITY
End: 2022-12-13 | Stop reason: HOSPADM

## 2022-10-15 RX ORDER — UREA 10 %
3 LOTION (ML) TOPICAL NIGHTLY PRN
COMMUNITY
End: 2022-11-16 | Stop reason: HOSPADM

## 2022-10-15 RX ORDER — PROMETHAZINE HYDROCHLORIDE 25 MG/1
25 TABLET ORAL EVERY 6 HOURS PRN
COMMUNITY
End: 2022-11-16 | Stop reason: HOSPADM

## 2022-10-15 RX ORDER — OLANZAPINE 5 MG/1
5 TABLET ORAL 2 TIMES DAILY
Status: DISCONTINUED | OUTPATIENT
Start: 2022-10-15 | End: 2022-10-28

## 2022-10-15 RX ORDER — ZIPRASIDONE MESYLATE 20 MG/ML
20 INJECTION, POWDER, LYOPHILIZED, FOR SOLUTION INTRAMUSCULAR EVERY 4 HOURS PRN
Status: DISCONTINUED | OUTPATIENT
Start: 2022-10-15 | End: 2022-11-02

## 2022-10-15 RX ORDER — FERROUS SULFATE 325(65) MG
325 TABLET ORAL
Status: DISCONTINUED | OUTPATIENT
Start: 2022-10-15 | End: 2022-11-16 | Stop reason: HOSPADM

## 2022-10-15 RX ORDER — HALOPERIDOL 5 MG/ML
1 INJECTION INTRAMUSCULAR ONCE
Status: COMPLETED | OUTPATIENT
Start: 2022-10-15 | End: 2022-10-15

## 2022-10-15 RX ORDER — FERROUS SULFATE 325(65) MG
325 TABLET ORAL
COMMUNITY

## 2022-10-15 RX ORDER — LISINOPRIL 10 MG/1
30 TABLET ORAL DAILY
COMMUNITY
End: 2022-11-16 | Stop reason: HOSPADM

## 2022-10-15 RX ADMIN — VANCOMYCIN HYDROCHLORIDE 750 MG: 750 INJECTION, SOLUTION INTRAVENOUS at 22:07

## 2022-10-15 RX ADMIN — ACETAMINOPHEN 650 MG: 325 TABLET, FILM COATED ORAL at 13:45

## 2022-10-15 RX ADMIN — INSULIN LISPRO 2 UNITS: 100 INJECTION, SOLUTION INTRAVENOUS; SUBCUTANEOUS at 12:08

## 2022-10-15 RX ADMIN — ACETAMINOPHEN 650 MG: 325 TABLET, FILM COATED ORAL at 21:49

## 2022-10-15 RX ADMIN — INSULIN LISPRO 4 UNITS: 100 INJECTION, SOLUTION INTRAVENOUS; SUBCUTANEOUS at 17:53

## 2022-10-15 RX ADMIN — CEFEPIME HYDROCHLORIDE 2 G: 2 INJECTION, POWDER, FOR SOLUTION INTRAMUSCULAR; INTRAVENOUS at 01:12

## 2022-10-15 RX ADMIN — OLANZAPINE 5 MG: 5 TABLET, FILM COATED ORAL at 15:00

## 2022-10-15 RX ADMIN — HALOPERIDOL LACTATE 1 MG: 5 INJECTION, SOLUTION INTRAMUSCULAR at 06:28

## 2022-10-15 RX ADMIN — CEFEPIME HYDROCHLORIDE 2 G: 2 INJECTION, POWDER, FOR SOLUTION INTRAMUSCULAR; INTRAVENOUS at 17:52

## 2022-10-15 RX ADMIN — FERROUS SULFATE TAB 325 MG (65 MG ELEMENTAL FE) 325 MG: 325 (65 FE) TAB at 15:00

## 2022-10-15 RX ADMIN — OLANZAPINE 5 MG: 5 TABLET, FILM COATED ORAL at 21:49

## 2022-10-15 RX ADMIN — CEFEPIME HYDROCHLORIDE 2 G: 2 INJECTION, POWDER, FOR SOLUTION INTRAMUSCULAR; INTRAVENOUS at 11:09

## 2022-10-15 RX ADMIN — LISINOPRIL 30 MG: 20 TABLET ORAL at 15:00

## 2022-10-16 LAB
ALBUMIN SERPL-MCNC: 2.8 G/DL (ref 3.5–5.2)
ALBUMIN/GLOB SERPL: 0.6 G/DL
ALP SERPL-CCNC: 112 U/L (ref 39–117)
ALT SERPL W P-5'-P-CCNC: 16 U/L (ref 1–41)
ANION GAP SERPL CALCULATED.3IONS-SCNC: 8 MMOL/L (ref 5–15)
AST SERPL-CCNC: 13 U/L (ref 1–40)
BASOPHILS # BLD AUTO: 0.04 10*3/MM3 (ref 0–0.2)
BASOPHILS NFR BLD AUTO: 0.6 % (ref 0–1.5)
BILIRUB SERPL-MCNC: <0.2 MG/DL (ref 0–1.2)
BUN SERPL-MCNC: 13 MG/DL (ref 8–23)
BUN/CREAT SERPL: 15.5 (ref 7–25)
CALCIUM SPEC-SCNC: 9 MG/DL (ref 8.6–10.5)
CHLORIDE SERPL-SCNC: 101 MMOL/L (ref 98–107)
CK SERPL-CCNC: 160 U/L (ref 20–200)
CO2 SERPL-SCNC: 25 MMOL/L (ref 22–29)
CREAT SERPL-MCNC: 0.84 MG/DL (ref 0.76–1.27)
DEPRECATED RDW RBC AUTO: 43.9 FL (ref 37–54)
EGFRCR SERPLBLD CKD-EPI 2021: 96.8 ML/MIN/1.73
EOSINOPHIL # BLD AUTO: 0.24 10*3/MM3 (ref 0–0.4)
EOSINOPHIL NFR BLD AUTO: 3.5 % (ref 0.3–6.2)
ERYTHROCYTE [DISTWIDTH] IN BLOOD BY AUTOMATED COUNT: 13.6 % (ref 12.3–15.4)
GLOBULIN UR ELPH-MCNC: 4.4 GM/DL
GLUCOSE BLDC GLUCOMTR-MCNC: 216 MG/DL (ref 70–130)
GLUCOSE BLDC GLUCOMTR-MCNC: 261 MG/DL (ref 70–130)
GLUCOSE BLDC GLUCOMTR-MCNC: 327 MG/DL (ref 70–130)
GLUCOSE SERPL-MCNC: 277 MG/DL (ref 65–99)
HBA1C MFR BLD: 7 % (ref 4.8–5.6)
HCT VFR BLD AUTO: 27.5 % (ref 37.5–51)
HGB BLD-MCNC: 8.7 G/DL (ref 13–17.7)
IMM GRANULOCYTES # BLD AUTO: 0.02 10*3/MM3 (ref 0–0.05)
IMM GRANULOCYTES NFR BLD AUTO: 0.3 % (ref 0–0.5)
LYMPHOCYTES # BLD AUTO: 1.53 10*3/MM3 (ref 0.7–3.1)
LYMPHOCYTES NFR BLD AUTO: 22.5 % (ref 19.6–45.3)
MCH RBC QN AUTO: 28.2 PG (ref 26.6–33)
MCHC RBC AUTO-ENTMCNC: 31.6 G/DL (ref 31.5–35.7)
MCV RBC AUTO: 89 FL (ref 79–97)
MONOCYTES # BLD AUTO: 0.55 10*3/MM3 (ref 0.1–0.9)
MONOCYTES NFR BLD AUTO: 8.1 % (ref 5–12)
NEUTROPHILS NFR BLD AUTO: 4.43 10*3/MM3 (ref 1.7–7)
NEUTROPHILS NFR BLD AUTO: 65 % (ref 42.7–76)
NRBC BLD AUTO-RTO: 0 /100 WBC (ref 0–0.2)
PLATELET # BLD AUTO: 447 10*3/MM3 (ref 140–450)
PMV BLD AUTO: 9.7 FL (ref 6–12)
POTASSIUM SERPL-SCNC: 4.7 MMOL/L (ref 3.5–5.2)
PROT SERPL-MCNC: 7.2 G/DL (ref 6–8.5)
RBC # BLD AUTO: 3.09 10*6/MM3 (ref 4.14–5.8)
SODIUM SERPL-SCNC: 134 MMOL/L (ref 136–145)
VANCOMYCIN TROUGH SERPL-MCNC: 8.5 MCG/ML (ref 5–20)
WBC NRBC COR # BLD: 6.81 10*3/MM3 (ref 3.4–10.8)

## 2022-10-16 PROCEDURE — 80202 ASSAY OF VANCOMYCIN: CPT

## 2022-10-16 PROCEDURE — 63710000001 INSULIN LISPRO (HUMAN) PER 5 UNITS: Performed by: FAMILY MEDICINE

## 2022-10-16 PROCEDURE — 82550 ASSAY OF CK (CPK): CPT | Performed by: INTERNAL MEDICINE

## 2022-10-16 PROCEDURE — 85025 COMPLETE CBC W/AUTO DIFF WBC: CPT

## 2022-10-16 PROCEDURE — 25010000002 CEFEPIME PER 500 MG: Performed by: NURSE PRACTITIONER

## 2022-10-16 PROCEDURE — 80053 COMPREHEN METABOLIC PANEL: CPT | Performed by: INTERNAL MEDICINE

## 2022-10-16 PROCEDURE — 25010000002 VANCOMYCIN PER 500 MG

## 2022-10-16 PROCEDURE — 83036 HEMOGLOBIN GLYCOSYLATED A1C: CPT | Performed by: ORTHOPAEDIC SURGERY

## 2022-10-16 PROCEDURE — 63710000001 INSULIN LISPRO (HUMAN) PER 5 UNITS: Performed by: NURSE PRACTITIONER

## 2022-10-16 PROCEDURE — 99231 SBSQ HOSP IP/OBS SF/LOW 25: CPT | Performed by: ORTHOPAEDIC SURGERY

## 2022-10-16 PROCEDURE — 82962 GLUCOSE BLOOD TEST: CPT

## 2022-10-16 PROCEDURE — 99232 SBSQ HOSP IP/OBS MODERATE 35: CPT | Performed by: FAMILY MEDICINE

## 2022-10-16 RX ORDER — VANCOMYCIN HYDROCHLORIDE 1 G/200ML
1000 INJECTION, SOLUTION INTRAVENOUS EVERY 12 HOURS
Status: DISCONTINUED | OUTPATIENT
Start: 2022-10-16 | End: 2022-10-16

## 2022-10-16 RX ORDER — VANCOMYCIN HYDROCHLORIDE 1 G/200ML
1000 INJECTION, SOLUTION INTRAVENOUS EVERY 12 HOURS
Status: DISCONTINUED | OUTPATIENT
Start: 2022-10-17 | End: 2022-10-18

## 2022-10-16 RX ORDER — INSULIN LISPRO 100 [IU]/ML
5 INJECTION, SOLUTION INTRAVENOUS; SUBCUTANEOUS
Status: DISCONTINUED | OUTPATIENT
Start: 2022-10-16 | End: 2022-10-21

## 2022-10-16 RX ORDER — QUETIAPINE FUMARATE 25 MG/1
25 TABLET, FILM COATED ORAL ONCE
Status: COMPLETED | OUTPATIENT
Start: 2022-10-16 | End: 2022-10-16

## 2022-10-16 RX ADMIN — VANCOMYCIN HYDROCHLORIDE 1000 MG: 1 INJECTION, SOLUTION INTRAVENOUS at 11:43

## 2022-10-16 RX ADMIN — ACETAMINOPHEN 650 MG: 325 TABLET, FILM COATED ORAL at 19:49

## 2022-10-16 RX ADMIN — INSULIN LISPRO 5 UNITS: 100 INJECTION, SOLUTION INTRAVENOUS; SUBCUTANEOUS at 17:49

## 2022-10-16 RX ADMIN — INSULIN LISPRO 4 UNITS: 100 INJECTION, SOLUTION INTRAVENOUS; SUBCUTANEOUS at 11:43

## 2022-10-16 RX ADMIN — INSULIN LISPRO 3 UNITS: 100 INJECTION, SOLUTION INTRAVENOUS; SUBCUTANEOUS at 08:44

## 2022-10-16 RX ADMIN — FERROUS SULFATE TAB 325 MG (65 MG ELEMENTAL FE) 325 MG: 325 (65 FE) TAB at 08:43

## 2022-10-16 RX ADMIN — OLANZAPINE 5 MG: 5 TABLET, FILM COATED ORAL at 08:44

## 2022-10-16 RX ADMIN — CEFEPIME HYDROCHLORIDE 2 G: 2 INJECTION, POWDER, FOR SOLUTION INTRAMUSCULAR; INTRAVENOUS at 10:03

## 2022-10-16 RX ADMIN — LISINOPRIL 30 MG: 20 TABLET ORAL at 08:43

## 2022-10-16 RX ADMIN — INSULIN LISPRO 5 UNITS: 100 INJECTION, SOLUTION INTRAVENOUS; SUBCUTANEOUS at 17:48

## 2022-10-16 RX ADMIN — INSULIN LISPRO 5 UNITS: 100 INJECTION, SOLUTION INTRAVENOUS; SUBCUTANEOUS at 11:43

## 2022-10-16 RX ADMIN — Medication 10 ML: at 19:59

## 2022-10-16 RX ADMIN — CEFEPIME HYDROCHLORIDE 2 G: 2 INJECTION, POWDER, FOR SOLUTION INTRAMUSCULAR; INTRAVENOUS at 17:49

## 2022-10-16 RX ADMIN — QUETIAPINE FUMARATE 25 MG: 25 TABLET, FILM COATED ORAL at 22:59

## 2022-10-16 RX ADMIN — CEFEPIME HYDROCHLORIDE 2 G: 2 INJECTION, POWDER, FOR SOLUTION INTRAMUSCULAR; INTRAVENOUS at 02:45

## 2022-10-16 RX ADMIN — OLANZAPINE 5 MG: 5 TABLET, FILM COATED ORAL at 19:49

## 2022-10-17 LAB
ALBUMIN SERPL-MCNC: 3.1 G/DL (ref 3.5–5.2)
ALBUMIN/GLOB SERPL: 0.8 G/DL
ALP SERPL-CCNC: 125 U/L (ref 39–117)
ALT SERPL W P-5'-P-CCNC: 21 U/L (ref 1–41)
ANION GAP SERPL CALCULATED.3IONS-SCNC: 10 MMOL/L (ref 5–15)
AST SERPL-CCNC: 17 U/L (ref 1–40)
BASOPHILS # BLD AUTO: 0.04 10*3/MM3 (ref 0–0.2)
BASOPHILS NFR BLD AUTO: 0.5 % (ref 0–1.5)
BILIRUB SERPL-MCNC: <0.2 MG/DL (ref 0–1.2)
BUN SERPL-MCNC: 15 MG/DL (ref 8–23)
BUN/CREAT SERPL: 19.7 (ref 7–25)
CALCIUM SPEC-SCNC: 9.2 MG/DL (ref 8.6–10.5)
CHLORIDE SERPL-SCNC: 100 MMOL/L (ref 98–107)
CK SERPL-CCNC: 184 U/L (ref 20–200)
CO2 SERPL-SCNC: 27 MMOL/L (ref 22–29)
CREAT SERPL-MCNC: 0.76 MG/DL (ref 0.76–1.27)
DEPRECATED RDW RBC AUTO: 45.3 FL (ref 37–54)
EGFRCR SERPLBLD CKD-EPI 2021: 99.7 ML/MIN/1.73
EOSINOPHIL # BLD AUTO: 0.26 10*3/MM3 (ref 0–0.4)
EOSINOPHIL NFR BLD AUTO: 3 % (ref 0.3–6.2)
ERYTHROCYTE [DISTWIDTH] IN BLOOD BY AUTOMATED COUNT: 13.7 % (ref 12.3–15.4)
GLOBULIN UR ELPH-MCNC: 3.7 GM/DL
GLUCOSE BLDC GLUCOMTR-MCNC: 242 MG/DL (ref 70–130)
GLUCOSE BLDC GLUCOMTR-MCNC: 269 MG/DL (ref 70–130)
GLUCOSE BLDC GLUCOMTR-MCNC: 359 MG/DL (ref 70–130)
GLUCOSE SERPL-MCNC: 190 MG/DL (ref 65–99)
HCT VFR BLD AUTO: 28.5 % (ref 37.5–51)
HGB BLD-MCNC: 8.9 G/DL (ref 13–17.7)
IMM GRANULOCYTES # BLD AUTO: 0.03 10*3/MM3 (ref 0–0.05)
IMM GRANULOCYTES NFR BLD AUTO: 0.3 % (ref 0–0.5)
LYMPHOCYTES # BLD AUTO: 1.44 10*3/MM3 (ref 0.7–3.1)
LYMPHOCYTES NFR BLD AUTO: 16.7 % (ref 19.6–45.3)
MCH RBC QN AUTO: 28.4 PG (ref 26.6–33)
MCHC RBC AUTO-ENTMCNC: 31.2 G/DL (ref 31.5–35.7)
MCV RBC AUTO: 91.1 FL (ref 79–97)
MONOCYTES # BLD AUTO: 0.57 10*3/MM3 (ref 0.1–0.9)
MONOCYTES NFR BLD AUTO: 6.6 % (ref 5–12)
NEUTROPHILS NFR BLD AUTO: 6.26 10*3/MM3 (ref 1.7–7)
NEUTROPHILS NFR BLD AUTO: 72.9 % (ref 42.7–76)
NRBC BLD AUTO-RTO: 0 /100 WBC (ref 0–0.2)
PLATELET # BLD AUTO: 451 10*3/MM3 (ref 140–450)
PMV BLD AUTO: 10 FL (ref 6–12)
POTASSIUM SERPL-SCNC: 4.7 MMOL/L (ref 3.5–5.2)
PROT SERPL-MCNC: 6.8 G/DL (ref 6–8.5)
QT INTERVAL: 368 MS
QTC INTERVAL: 427 MS
RBC # BLD AUTO: 3.13 10*6/MM3 (ref 4.14–5.8)
SODIUM SERPL-SCNC: 137 MMOL/L (ref 136–145)
WBC NRBC COR # BLD: 8.6 10*3/MM3 (ref 3.4–10.8)

## 2022-10-17 PROCEDURE — 82962 GLUCOSE BLOOD TEST: CPT

## 2022-10-17 PROCEDURE — 93010 ELECTROCARDIOGRAM REPORT: CPT | Performed by: STUDENT IN AN ORGANIZED HEALTH CARE EDUCATION/TRAINING PROGRAM

## 2022-10-17 PROCEDURE — 85025 COMPLETE CBC W/AUTO DIFF WBC: CPT | Performed by: INTERNAL MEDICINE

## 2022-10-17 PROCEDURE — 99232 SBSQ HOSP IP/OBS MODERATE 35: CPT | Performed by: ORTHOPAEDIC SURGERY

## 2022-10-17 PROCEDURE — 80053 COMPREHEN METABOLIC PANEL: CPT | Performed by: INTERNAL MEDICINE

## 2022-10-17 PROCEDURE — 99232 SBSQ HOSP IP/OBS MODERATE 35: CPT | Performed by: INTERNAL MEDICINE

## 2022-10-17 PROCEDURE — 25010000002 VANCOMYCIN PER 500 MG: Performed by: INTERNAL MEDICINE

## 2022-10-17 PROCEDURE — 63710000001 INSULIN LISPRO (HUMAN) PER 5 UNITS: Performed by: FAMILY MEDICINE

## 2022-10-17 PROCEDURE — 93005 ELECTROCARDIOGRAM TRACING: CPT | Performed by: ORTHOPAEDIC SURGERY

## 2022-10-17 PROCEDURE — 63710000001 INSULIN LISPRO (HUMAN) PER 5 UNITS: Performed by: NURSE PRACTITIONER

## 2022-10-17 PROCEDURE — 25010000002 CEFEPIME PER 500 MG: Performed by: NURSE PRACTITIONER

## 2022-10-17 PROCEDURE — 63710000001 INSULIN DETEMIR PER 5 UNITS: Performed by: INTERNAL MEDICINE

## 2022-10-17 PROCEDURE — 82550 ASSAY OF CK (CPK): CPT | Performed by: INTERNAL MEDICINE

## 2022-10-17 RX ORDER — INSULIN LISPRO 100 [IU]/ML
2 INJECTION, SOLUTION INTRAVENOUS; SUBCUTANEOUS
Status: DISCONTINUED | OUTPATIENT
Start: 2022-10-17 | End: 2022-10-17

## 2022-10-17 RX ADMIN — VANCOMYCIN HYDROCHLORIDE 1000 MG: 1 INJECTION, SOLUTION INTRAVENOUS at 23:32

## 2022-10-17 RX ADMIN — INSULIN LISPRO 5 UNITS: 100 INJECTION, SOLUTION INTRAVENOUS; SUBCUTANEOUS at 12:20

## 2022-10-17 RX ADMIN — INSULIN LISPRO 5 UNITS: 100 INJECTION, SOLUTION INTRAVENOUS; SUBCUTANEOUS at 17:37

## 2022-10-17 RX ADMIN — INSULIN LISPRO 3 UNITS: 100 INJECTION, SOLUTION INTRAVENOUS; SUBCUTANEOUS at 08:13

## 2022-10-17 RX ADMIN — OLANZAPINE 5 MG: 5 TABLET, FILM COATED ORAL at 08:13

## 2022-10-17 RX ADMIN — LISINOPRIL 30 MG: 20 TABLET ORAL at 08:13

## 2022-10-17 RX ADMIN — VANCOMYCIN HYDROCHLORIDE 1000 MG: 1 INJECTION, SOLUTION INTRAVENOUS at 01:26

## 2022-10-17 RX ADMIN — INSULIN LISPRO 6 UNITS: 100 INJECTION, SOLUTION INTRAVENOUS; SUBCUTANEOUS at 16:49

## 2022-10-17 RX ADMIN — VANCOMYCIN HYDROCHLORIDE 1000 MG: 1 INJECTION, SOLUTION INTRAVENOUS at 14:24

## 2022-10-17 RX ADMIN — CEFEPIME HYDROCHLORIDE 2 G: 2 INJECTION, POWDER, FOR SOLUTION INTRAMUSCULAR; INTRAVENOUS at 01:48

## 2022-10-17 RX ADMIN — Medication 10 ML: at 08:16

## 2022-10-17 RX ADMIN — INSULIN LISPRO 5 UNITS: 100 INJECTION, SOLUTION INTRAVENOUS; SUBCUTANEOUS at 08:15

## 2022-10-17 RX ADMIN — OLANZAPINE 5 MG: 5 TABLET, FILM COATED ORAL at 20:10

## 2022-10-17 RX ADMIN — INSULIN LISPRO 4 UNITS: 100 INJECTION, SOLUTION INTRAVENOUS; SUBCUTANEOUS at 12:20

## 2022-10-17 RX ADMIN — CEFEPIME HYDROCHLORIDE 2 G: 2 INJECTION, POWDER, FOR SOLUTION INTRAMUSCULAR; INTRAVENOUS at 17:37

## 2022-10-17 RX ADMIN — INSULIN DETEMIR 5 UNITS: 100 INJECTION, SOLUTION SUBCUTANEOUS at 17:37

## 2022-10-17 RX ADMIN — FERROUS SULFATE TAB 325 MG (65 MG ELEMENTAL FE) 325 MG: 325 (65 FE) TAB at 08:13

## 2022-10-17 RX ADMIN — CEFEPIME HYDROCHLORIDE 2 G: 2 INJECTION, POWDER, FOR SOLUTION INTRAMUSCULAR; INTRAVENOUS at 10:03

## 2022-10-18 ENCOUNTER — ANESTHESIA EVENT (OUTPATIENT)
Dept: PERIOP | Facility: HOSPITAL | Age: 67
End: 2022-10-18

## 2022-10-18 ENCOUNTER — ANESTHESIA EVENT CONVERTED (OUTPATIENT)
Dept: ANESTHESIOLOGY | Facility: HOSPITAL | Age: 67
End: 2022-10-18

## 2022-10-18 ENCOUNTER — ANESTHESIA (OUTPATIENT)
Dept: PERIOP | Facility: HOSPITAL | Age: 67
End: 2022-10-18

## 2022-10-18 LAB
ANION GAP SERPL CALCULATED.3IONS-SCNC: 7 MMOL/L (ref 5–15)
BACTERIA SPEC AEROBE CULT: ABNORMAL
BASOPHILS # BLD AUTO: 0.04 10*3/MM3 (ref 0–0.2)
BASOPHILS NFR BLD AUTO: 0.5 % (ref 0–1.5)
BUN SERPL-MCNC: 18 MG/DL (ref 8–23)
BUN/CREAT SERPL: 20.7 (ref 7–25)
CALCIUM SPEC-SCNC: 8.8 MG/DL (ref 8.6–10.5)
CHLORIDE SERPL-SCNC: 102 MMOL/L (ref 98–107)
CO2 SERPL-SCNC: 25 MMOL/L (ref 22–29)
CREAT SERPL-MCNC: 0.87 MG/DL (ref 0.76–1.27)
DEPRECATED RDW RBC AUTO: 45.4 FL (ref 37–54)
EGFRCR SERPLBLD CKD-EPI 2021: 95.8 ML/MIN/1.73
EOSINOPHIL # BLD AUTO: 0.3 10*3/MM3 (ref 0–0.4)
EOSINOPHIL NFR BLD AUTO: 3.8 % (ref 0.3–6.2)
ERYTHROCYTE [DISTWIDTH] IN BLOOD BY AUTOMATED COUNT: 14 % (ref 12.3–15.4)
GLUCOSE BLDC GLUCOMTR-MCNC: 112 MG/DL (ref 70–130)
GLUCOSE BLDC GLUCOMTR-MCNC: 162 MG/DL (ref 70–130)
GLUCOSE BLDC GLUCOMTR-MCNC: 291 MG/DL (ref 70–130)
GLUCOSE BLDC GLUCOMTR-MCNC: 89 MG/DL (ref 70–130)
GLUCOSE SERPL-MCNC: 306 MG/DL (ref 65–99)
GRAM STN SPEC: ABNORMAL
GRAM STN SPEC: ABNORMAL
HCT VFR BLD AUTO: 27.2 % (ref 37.5–51)
HGB BLD-MCNC: 8.5 G/DL (ref 13–17.7)
IMM GRANULOCYTES # BLD AUTO: 0.03 10*3/MM3 (ref 0–0.05)
IMM GRANULOCYTES NFR BLD AUTO: 0.4 % (ref 0–0.5)
INR PPP: 1.08 (ref 0.84–1.13)
IRON 24H UR-MRATE: 34 MCG/DL (ref 59–158)
IRON SATN MFR SERPL: 13 % (ref 20–50)
LYMPHOCYTES # BLD AUTO: 1.65 10*3/MM3 (ref 0.7–3.1)
LYMPHOCYTES NFR BLD AUTO: 21.1 % (ref 19.6–45.3)
MCH RBC QN AUTO: 28.1 PG (ref 26.6–33)
MCHC RBC AUTO-ENTMCNC: 31.3 G/DL (ref 31.5–35.7)
MCV RBC AUTO: 90.1 FL (ref 79–97)
MONOCYTES # BLD AUTO: 0.68 10*3/MM3 (ref 0.1–0.9)
MONOCYTES NFR BLD AUTO: 8.7 % (ref 5–12)
NEUTROPHILS NFR BLD AUTO: 5.13 10*3/MM3 (ref 1.7–7)
NEUTROPHILS NFR BLD AUTO: 65.5 % (ref 42.7–76)
NRBC BLD AUTO-RTO: 0 /100 WBC (ref 0–0.2)
PLATELET # BLD AUTO: 400 10*3/MM3 (ref 140–450)
PMV BLD AUTO: 9.7 FL (ref 6–12)
POTASSIUM SERPL-SCNC: 4.3 MMOL/L (ref 3.5–5.2)
PROTHROMBIN TIME: 13.9 SECONDS (ref 11.4–14.4)
RBC # BLD AUTO: 3.02 10*6/MM3 (ref 4.14–5.8)
SODIUM SERPL-SCNC: 134 MMOL/L (ref 136–145)
TIBC SERPL-MCNC: 270 MCG/DL (ref 298–536)
TRANSFERRIN SERPL-MCNC: 181 MG/DL (ref 200–360)
VANCOMYCIN TROUGH SERPL-MCNC: 23.5 MCG/ML (ref 5–20)
WBC NRBC COR # BLD: 7.83 10*3/MM3 (ref 3.4–10.8)

## 2022-10-18 PROCEDURE — 85025 COMPLETE CBC W/AUTO DIFF WBC: CPT | Performed by: ORTHOPAEDIC SURGERY

## 2022-10-18 PROCEDURE — 25010000002 CEFEPIME PER 500 MG: Performed by: NURSE PRACTITIONER

## 2022-10-18 PROCEDURE — 25010000002 FENTANYL CITRATE (PF) 50 MCG/ML SOLUTION

## 2022-10-18 PROCEDURE — 25010000002 ONDANSETRON PER 1 MG: Performed by: NURSE ANESTHETIST, CERTIFIED REGISTERED

## 2022-10-18 PROCEDURE — 0Y6Q0Z0 DETACHMENT AT LEFT 1ST TOE, COMPLETE, OPEN APPROACH: ICD-10-PCS | Performed by: ORTHOPAEDIC SURGERY

## 2022-10-18 PROCEDURE — 25010000002 CEFEPIME PER 500 MG: Performed by: ORTHOPAEDIC SURGERY

## 2022-10-18 PROCEDURE — 82962 GLUCOSE BLOOD TEST: CPT

## 2022-10-18 PROCEDURE — 87206 SMEAR FLUORESCENT/ACID STAI: CPT | Performed by: ORTHOPAEDIC SURGERY

## 2022-10-18 PROCEDURE — 25010000002 PROPOFOL 10 MG/ML EMULSION: Performed by: NURSE ANESTHETIST, CERTIFIED REGISTERED

## 2022-10-18 PROCEDURE — 87075 CULTR BACTERIA EXCEPT BLOOD: CPT | Performed by: ORTHOPAEDIC SURGERY

## 2022-10-18 PROCEDURE — 87205 SMEAR GRAM STAIN: CPT | Performed by: ORTHOPAEDIC SURGERY

## 2022-10-18 PROCEDURE — 80048 BASIC METABOLIC PNL TOTAL CA: CPT | Performed by: ORTHOPAEDIC SURGERY

## 2022-10-18 PROCEDURE — 88311 DECALCIFY TISSUE: CPT | Performed by: ORTHOPAEDIC SURGERY

## 2022-10-18 PROCEDURE — 25010000002 DEXAMETHASONE PER 1 MG: Performed by: NURSE ANESTHETIST, CERTIFIED REGISTERED

## 2022-10-18 PROCEDURE — 85610 PROTHROMBIN TIME: CPT | Performed by: ORTHOPAEDIC SURGERY

## 2022-10-18 PROCEDURE — 25010000002 NA FERRIC GLUC CPLX PER 12.5 MG: Performed by: INTERNAL MEDICINE

## 2022-10-18 PROCEDURE — 25010000002 FENTANYL CITRATE (PF) 50 MCG/ML SOLUTION: Performed by: NURSE ANESTHETIST, CERTIFIED REGISTERED

## 2022-10-18 PROCEDURE — 63710000001 INSULIN LISPRO (HUMAN) PER 5 UNITS: Performed by: NURSE PRACTITIONER

## 2022-10-18 PROCEDURE — 84466 ASSAY OF TRANSFERRIN: CPT | Performed by: INTERNAL MEDICINE

## 2022-10-18 PROCEDURE — 99024 POSTOP FOLLOW-UP VISIT: CPT | Performed by: ORTHOPAEDIC SURGERY

## 2022-10-18 PROCEDURE — 25010000002 VANCOMYCIN PER 500 MG

## 2022-10-18 PROCEDURE — 87176 TISSUE HOMOGENIZATION CULTR: CPT | Performed by: ORTHOPAEDIC SURGERY

## 2022-10-18 PROCEDURE — 87116 MYCOBACTERIA CULTURE: CPT | Performed by: ORTHOPAEDIC SURGERY

## 2022-10-18 PROCEDURE — 28810 AMPUTATION TOE & METATARSAL: CPT | Performed by: ORTHOPAEDIC SURGERY

## 2022-10-18 PROCEDURE — 76942 ECHO GUIDE FOR BIOPSY: CPT | Performed by: ORTHOPAEDIC SURGERY

## 2022-10-18 PROCEDURE — 87186 SC STD MICRODIL/AGAR DIL: CPT | Performed by: ORTHOPAEDIC SURGERY

## 2022-10-18 PROCEDURE — 63710000001 INSULIN LISPRO (HUMAN) PER 5 UNITS: Performed by: FAMILY MEDICINE

## 2022-10-18 PROCEDURE — 83540 ASSAY OF IRON: CPT | Performed by: INTERNAL MEDICINE

## 2022-10-18 PROCEDURE — 63710000001 INSULIN DETEMIR PER 5 UNITS: Performed by: ORTHOPAEDIC SURGERY

## 2022-10-18 PROCEDURE — 80202 ASSAY OF VANCOMYCIN: CPT

## 2022-10-18 PROCEDURE — 25010000002 DEXAMETHASONE SODIUM PHOSPHATE 10 MG/ML SOLUTION: Performed by: NURSE ANESTHETIST, CERTIFIED REGISTERED

## 2022-10-18 PROCEDURE — 99232 SBSQ HOSP IP/OBS MODERATE 35: CPT | Performed by: INTERNAL MEDICINE

## 2022-10-18 PROCEDURE — 88305 TISSUE EXAM BY PATHOLOGIST: CPT | Performed by: ORTHOPAEDIC SURGERY

## 2022-10-18 PROCEDURE — 87102 FUNGUS ISOLATION CULTURE: CPT | Performed by: ORTHOPAEDIC SURGERY

## 2022-10-18 PROCEDURE — 87077 CULTURE AEROBIC IDENTIFY: CPT | Performed by: ORTHOPAEDIC SURGERY

## 2022-10-18 PROCEDURE — 87070 CULTURE OTHR SPECIMN AEROBIC: CPT | Performed by: ORTHOPAEDIC SURGERY

## 2022-10-18 RX ORDER — NALOXONE HCL 0.4 MG/ML
0.4 VIAL (ML) INJECTION AS NEEDED
Status: DISCONTINUED | OUTPATIENT
Start: 2022-10-18 | End: 2022-10-18 | Stop reason: HOSPADM

## 2022-10-18 RX ORDER — HYDROMORPHONE HYDROCHLORIDE 1 MG/ML
0.5 INJECTION, SOLUTION INTRAMUSCULAR; INTRAVENOUS; SUBCUTANEOUS
Status: DISCONTINUED | OUTPATIENT
Start: 2022-10-18 | End: 2022-10-18 | Stop reason: HOSPADM

## 2022-10-18 RX ORDER — FENTANYL CITRATE 50 UG/ML
50 INJECTION, SOLUTION INTRAMUSCULAR; INTRAVENOUS
Status: DISCONTINUED | OUTPATIENT
Start: 2022-10-18 | End: 2022-10-18 | Stop reason: HOSPADM

## 2022-10-18 RX ORDER — BUPIVACAINE HCL/0.9 % NACL/PF 0.125 %
PLASTIC BAG, INJECTION (ML) EPIDURAL AS NEEDED
Status: DISCONTINUED | OUTPATIENT
Start: 2022-10-18 | End: 2022-10-18 | Stop reason: SURG

## 2022-10-18 RX ORDER — FENTANYL CITRATE 50 UG/ML
INJECTION, SOLUTION INTRAMUSCULAR; INTRAVENOUS
Status: COMPLETED
Start: 2022-10-18 | End: 2022-10-18

## 2022-10-18 RX ORDER — SODIUM CHLORIDE, SODIUM LACTATE, POTASSIUM CHLORIDE, CALCIUM CHLORIDE 600; 310; 30; 20 MG/100ML; MG/100ML; MG/100ML; MG/100ML
9 INJECTION, SOLUTION INTRAVENOUS CONTINUOUS
Status: DISCONTINUED | OUTPATIENT
Start: 2022-10-18 | End: 2022-10-25

## 2022-10-18 RX ORDER — MIDAZOLAM HYDROCHLORIDE 1 MG/ML
1 INJECTION INTRAMUSCULAR; INTRAVENOUS
Status: DISCONTINUED | OUTPATIENT
Start: 2022-10-18 | End: 2022-10-18 | Stop reason: HOSPADM

## 2022-10-18 RX ORDER — OXYCODONE HYDROCHLORIDE 5 MG/1
5 TABLET ORAL EVERY 4 HOURS PRN
Status: DISPENSED | OUTPATIENT
Start: 2022-10-18 | End: 2022-11-01

## 2022-10-18 RX ORDER — PROPOFOL 10 MG/ML
VIAL (ML) INTRAVENOUS AS NEEDED
Status: DISCONTINUED | OUTPATIENT
Start: 2022-10-18 | End: 2022-10-18 | Stop reason: SURG

## 2022-10-18 RX ORDER — POLYETHYLENE GLYCOL 3350 17 G/17G
17 POWDER, FOR SOLUTION ORAL AS NEEDED
Status: DISCONTINUED | OUTPATIENT
Start: 2022-10-18 | End: 2022-11-16 | Stop reason: HOSPADM

## 2022-10-18 RX ORDER — DROPERIDOL 2.5 MG/ML
0.62 INJECTION, SOLUTION INTRAMUSCULAR; INTRAVENOUS
Status: DISCONTINUED | OUTPATIENT
Start: 2022-10-18 | End: 2022-10-18 | Stop reason: HOSPADM

## 2022-10-18 RX ORDER — MAGNESIUM SULFATE HEPTAHYDRATE 40 MG/ML
2 INJECTION, SOLUTION INTRAVENOUS AS NEEDED
Status: DISCONTINUED | OUTPATIENT
Start: 2022-10-18 | End: 2022-11-16 | Stop reason: HOSPADM

## 2022-10-18 RX ORDER — PROMETHAZINE HYDROCHLORIDE 25 MG/1
25 SUPPOSITORY RECTAL ONCE AS NEEDED
Status: DISCONTINUED | OUTPATIENT
Start: 2022-10-18 | End: 2022-10-18 | Stop reason: HOSPADM

## 2022-10-18 RX ORDER — HYDROMORPHONE HCL 110MG/55ML
0.5 PATIENT CONTROLLED ANALGESIA SYRINGE INTRAVENOUS
Status: DISPENSED | OUTPATIENT
Start: 2022-10-18 | End: 2022-10-25

## 2022-10-18 RX ORDER — BUPIVACAINE HYDROCHLORIDE 2.5 MG/ML
INJECTION, SOLUTION EPIDURAL; INFILTRATION; INTRACAUDAL
Status: COMPLETED | OUTPATIENT
Start: 2022-10-18 | End: 2022-10-18

## 2022-10-18 RX ORDER — PROMETHAZINE HYDROCHLORIDE 25 MG/1
25 TABLET ORAL ONCE AS NEEDED
Status: DISCONTINUED | OUTPATIENT
Start: 2022-10-18 | End: 2022-10-18 | Stop reason: HOSPADM

## 2022-10-18 RX ORDER — SODIUM CHLORIDE 0.9 % (FLUSH) 0.9 %
10 SYRINGE (ML) INJECTION EVERY 12 HOURS SCHEDULED
Status: DISCONTINUED | OUTPATIENT
Start: 2022-10-18 | End: 2022-10-18 | Stop reason: HOSPADM

## 2022-10-18 RX ORDER — DEXAMETHASONE SODIUM PHOSPHATE 10 MG/ML
INJECTION, SOLUTION INTRAMUSCULAR; INTRAVENOUS
Status: COMPLETED | OUTPATIENT
Start: 2022-10-18 | End: 2022-10-18

## 2022-10-18 RX ORDER — CHOLECALCIFEROL (VITAMIN D3) 125 MCG
5 CAPSULE ORAL NIGHTLY PRN
Status: DISCONTINUED | OUTPATIENT
Start: 2022-10-18 | End: 2022-11-16 | Stop reason: HOSPADM

## 2022-10-18 RX ORDER — ROCURONIUM BROMIDE 10 MG/ML
INJECTION, SOLUTION INTRAVENOUS AS NEEDED
Status: DISCONTINUED | OUTPATIENT
Start: 2022-10-18 | End: 2022-10-18 | Stop reason: SURG

## 2022-10-18 RX ORDER — MAGNESIUM HYDROXIDE 1200 MG/15ML
LIQUID ORAL AS NEEDED
Status: DISCONTINUED | OUTPATIENT
Start: 2022-10-18 | End: 2022-10-18 | Stop reason: HOSPADM

## 2022-10-18 RX ORDER — FAMOTIDINE 10 MG/ML
20 INJECTION, SOLUTION INTRAVENOUS ONCE
Status: CANCELLED | OUTPATIENT
Start: 2022-10-18 | End: 2022-10-18

## 2022-10-18 RX ORDER — SODIUM CHLORIDE 9 MG/ML
100 INJECTION, SOLUTION INTRAVENOUS CONTINUOUS
Status: DISCONTINUED | OUTPATIENT
Start: 2022-10-18 | End: 2022-10-25

## 2022-10-18 RX ORDER — AMOXICILLIN 250 MG
2 CAPSULE ORAL NIGHTLY
Status: DISCONTINUED | OUTPATIENT
Start: 2022-10-18 | End: 2022-11-05

## 2022-10-18 RX ORDER — DEXAMETHASONE SODIUM PHOSPHATE 4 MG/ML
INJECTION, SOLUTION INTRA-ARTICULAR; INTRALESIONAL; INTRAMUSCULAR; INTRAVENOUS; SOFT TISSUE AS NEEDED
Status: DISCONTINUED | OUTPATIENT
Start: 2022-10-18 | End: 2022-10-18 | Stop reason: SURG

## 2022-10-18 RX ORDER — SODIUM CHLORIDE 0.9 % (FLUSH) 0.9 %
3 SYRINGE (ML) INJECTION EVERY 12 HOURS SCHEDULED
Status: DISCONTINUED | OUTPATIENT
Start: 2022-10-18 | End: 2022-10-18 | Stop reason: HOSPADM

## 2022-10-18 RX ORDER — FENTANYL CITRATE 50 UG/ML
INJECTION, SOLUTION INTRAMUSCULAR; INTRAVENOUS
Status: COMPLETED | OUTPATIENT
Start: 2022-10-18 | End: 2022-10-18

## 2022-10-18 RX ORDER — MAGNESIUM SULFATE HEPTAHYDRATE 40 MG/ML
4 INJECTION, SOLUTION INTRAVENOUS AS NEEDED
Status: DISCONTINUED | OUTPATIENT
Start: 2022-10-18 | End: 2022-11-16 | Stop reason: HOSPADM

## 2022-10-18 RX ORDER — LABETALOL HYDROCHLORIDE 5 MG/ML
5 INJECTION, SOLUTION INTRAVENOUS
Status: DISCONTINUED | OUTPATIENT
Start: 2022-10-18 | End: 2022-10-18 | Stop reason: HOSPADM

## 2022-10-18 RX ORDER — BISACODYL 10 MG
10 SUPPOSITORY, RECTAL RECTAL DAILY PRN
Status: DISCONTINUED | OUTPATIENT
Start: 2022-10-18 | End: 2022-11-16 | Stop reason: HOSPADM

## 2022-10-18 RX ORDER — ACETAMINOPHEN 325 MG/1
650 TABLET ORAL EVERY 4 HOURS PRN
Status: DISCONTINUED | OUTPATIENT
Start: 2022-10-18 | End: 2022-11-16 | Stop reason: HOSPADM

## 2022-10-18 RX ORDER — NALOXONE HCL 0.4 MG/ML
0.1 VIAL (ML) INJECTION
Status: DISCONTINUED | OUTPATIENT
Start: 2022-10-18 | End: 2022-11-16 | Stop reason: HOSPADM

## 2022-10-18 RX ORDER — ACETAMINOPHEN 650 MG/1
650 SUPPOSITORY RECTAL EVERY 4 HOURS PRN
Status: DISCONTINUED | OUTPATIENT
Start: 2022-10-18 | End: 2022-11-08

## 2022-10-18 RX ORDER — ONDANSETRON 2 MG/ML
INJECTION INTRAMUSCULAR; INTRAVENOUS AS NEEDED
Status: DISCONTINUED | OUTPATIENT
Start: 2022-10-18 | End: 2022-10-18 | Stop reason: SURG

## 2022-10-18 RX ORDER — MEPERIDINE HYDROCHLORIDE 25 MG/ML
12.5 INJECTION INTRAMUSCULAR; INTRAVENOUS; SUBCUTANEOUS
Status: DISCONTINUED | OUTPATIENT
Start: 2022-10-18 | End: 2022-10-18 | Stop reason: HOSPADM

## 2022-10-18 RX ORDER — LIDOCAINE HYDROCHLORIDE 10 MG/ML
INJECTION, SOLUTION EPIDURAL; INFILTRATION; INTRACAUDAL; PERINEURAL AS NEEDED
Status: DISCONTINUED | OUTPATIENT
Start: 2022-10-18 | End: 2022-10-18 | Stop reason: SURG

## 2022-10-18 RX ORDER — MAGNESIUM SULFATE 1 G/100ML
1 INJECTION INTRAVENOUS AS NEEDED
Status: DISCONTINUED | OUTPATIENT
Start: 2022-10-18 | End: 2022-11-16 | Stop reason: HOSPADM

## 2022-10-18 RX ORDER — SODIUM CHLORIDE 0.9 % (FLUSH) 0.9 %
3-10 SYRINGE (ML) INJECTION AS NEEDED
Status: DISCONTINUED | OUTPATIENT
Start: 2022-10-18 | End: 2022-10-18 | Stop reason: HOSPADM

## 2022-10-18 RX ORDER — HYDROCODONE BITARTRATE AND ACETAMINOPHEN 5; 325 MG/1; MG/1
1 TABLET ORAL ONCE AS NEEDED
Status: DISCONTINUED | OUTPATIENT
Start: 2022-10-18 | End: 2022-10-18 | Stop reason: HOSPADM

## 2022-10-18 RX ORDER — IPRATROPIUM BROMIDE AND ALBUTEROL SULFATE 2.5; .5 MG/3ML; MG/3ML
3 SOLUTION RESPIRATORY (INHALATION) ONCE AS NEEDED
Status: DISCONTINUED | OUTPATIENT
Start: 2022-10-18 | End: 2022-10-18 | Stop reason: HOSPADM

## 2022-10-18 RX ORDER — DROPERIDOL 2.5 MG/ML
0.62 INJECTION, SOLUTION INTRAMUSCULAR; INTRAVENOUS ONCE AS NEEDED
Status: DISCONTINUED | OUTPATIENT
Start: 2022-10-18 | End: 2022-10-18 | Stop reason: HOSPADM

## 2022-10-18 RX ORDER — MIDAZOLAM HYDROCHLORIDE 1 MG/ML
0.5 INJECTION INTRAMUSCULAR; INTRAVENOUS
Status: DISCONTINUED | OUTPATIENT
Start: 2022-10-18 | End: 2022-10-18 | Stop reason: HOSPADM

## 2022-10-18 RX ORDER — LIDOCAINE HYDROCHLORIDE 10 MG/ML
0.5 INJECTION, SOLUTION EPIDURAL; INFILTRATION; INTRACAUDAL; PERINEURAL ONCE AS NEEDED
Status: DISCONTINUED | OUTPATIENT
Start: 2022-10-18 | End: 2022-10-18 | Stop reason: HOSPADM

## 2022-10-18 RX ORDER — SODIUM CHLORIDE 0.9 % (FLUSH) 0.9 %
10 SYRINGE (ML) INJECTION AS NEEDED
Status: CANCELLED | OUTPATIENT
Start: 2022-10-18

## 2022-10-18 RX ORDER — FAMOTIDINE 20 MG/1
20 TABLET, FILM COATED ORAL ONCE
Status: COMPLETED | OUTPATIENT
Start: 2022-10-18 | End: 2022-10-18

## 2022-10-18 RX ADMIN — FAMOTIDINE 20 MG: 20 TABLET ORAL at 13:17

## 2022-10-18 RX ADMIN — CEFEPIME HYDROCHLORIDE 2 G: 2 INJECTION, POWDER, FOR SOLUTION INTRAMUSCULAR; INTRAVENOUS at 18:35

## 2022-10-18 RX ADMIN — FENTANYL CITRATE 50 MCG: 50 INJECTION, SOLUTION INTRAMUSCULAR; INTRAVENOUS at 17:35

## 2022-10-18 RX ADMIN — Medication 5 MG: at 20:43

## 2022-10-18 RX ADMIN — SODIUM CHLORIDE, POTASSIUM CHLORIDE, SODIUM LACTATE AND CALCIUM CHLORIDE 9 ML/HR: 600; 310; 30; 20 INJECTION, SOLUTION INTRAVENOUS at 13:51

## 2022-10-18 RX ADMIN — INSULIN LISPRO 4 UNITS: 100 INJECTION, SOLUTION INTRAVENOUS; SUBCUTANEOUS at 08:02

## 2022-10-18 RX ADMIN — BUPIVACAINE HYDROCHLORIDE 30 ML: 2.5 INJECTION, SOLUTION EPIDURAL; INFILTRATION; INTRACAUDAL; PERINEURAL at 13:29

## 2022-10-18 RX ADMIN — Medication 100 MCG: at 16:00

## 2022-10-18 RX ADMIN — SUGAMMADEX 200 MG: 100 INJECTION, SOLUTION INTRAVENOUS at 16:02

## 2022-10-18 RX ADMIN — CEFEPIME HYDROCHLORIDE 2 G: 2 INJECTION, POWDER, FOR SOLUTION INTRAMUSCULAR; INTRAVENOUS at 09:03

## 2022-10-18 RX ADMIN — LIDOCAINE HYDROCHLORIDE 50 MG: 10 INJECTION, SOLUTION EPIDURAL; INFILTRATION; INTRACAUDAL; PERINEURAL at 15:30

## 2022-10-18 RX ADMIN — FENTANYL CITRATE 50 MCG: 50 INJECTION, SOLUTION INTRAMUSCULAR; INTRAVENOUS at 18:06

## 2022-10-18 RX ADMIN — OLANZAPINE 5 MG: 5 TABLET, FILM COATED ORAL at 08:07

## 2022-10-18 RX ADMIN — FERROUS SULFATE TAB 325 MG (65 MG ELEMENTAL FE) 325 MG: 325 (65 FE) TAB at 08:07

## 2022-10-18 RX ADMIN — LISINOPRIL 30 MG: 20 TABLET ORAL at 08:02

## 2022-10-18 RX ADMIN — Medication 100 MCG: at 15:45

## 2022-10-18 RX ADMIN — DEXAMETHASONE SODIUM PHOSPHATE 4 MG: 4 INJECTION, SOLUTION INTRA-ARTICULAR; INTRALESIONAL; INTRAMUSCULAR; INTRAVENOUS; SOFT TISSUE at 15:33

## 2022-10-18 RX ADMIN — CEFEPIME HYDROCHLORIDE 2 G: 2 INJECTION, POWDER, FOR SOLUTION INTRAMUSCULAR; INTRAVENOUS at 01:20

## 2022-10-18 RX ADMIN — VANCOMYCIN HYDROCHLORIDE 750 MG: 750 INJECTION, SOLUTION INTRAVENOUS at 15:34

## 2022-10-18 RX ADMIN — DEXAMETHASONE SODIUM PHOSPHATE 2 MG: 10 INJECTION, SOLUTION INTRAMUSCULAR; INTRAVENOUS at 13:29

## 2022-10-18 RX ADMIN — INSULIN DETEMIR 5 UNITS: 100 INJECTION, SOLUTION SUBCUTANEOUS at 20:43

## 2022-10-18 RX ADMIN — ONDANSETRON 4 MG: 2 INJECTION INTRAMUSCULAR; INTRAVENOUS at 15:45

## 2022-10-18 RX ADMIN — SODIUM CHLORIDE 125 MG: 9 INJECTION, SOLUTION INTRAVENOUS at 22:31

## 2022-10-18 RX ADMIN — Medication 10 ML: at 08:03

## 2022-10-18 RX ADMIN — PROPOFOL 150 MG: 10 INJECTION, EMULSION INTRAVENOUS at 15:30

## 2022-10-18 RX ADMIN — Medication 10 ML: at 13:51

## 2022-10-18 RX ADMIN — Medication 10 ML: at 20:48

## 2022-10-18 RX ADMIN — SODIUM CHLORIDE 100 ML/HR: 9 INJECTION, SOLUTION INTRAVENOUS at 18:35

## 2022-10-18 RX ADMIN — ROCURONIUM BROMIDE 50 MG: 10 INJECTION, SOLUTION INTRAVENOUS at 15:30

## 2022-10-18 RX ADMIN — FENTANYL CITRATE 100 MCG: 50 INJECTION, SOLUTION INTRAMUSCULAR; INTRAVENOUS at 13:29

## 2022-10-18 RX ADMIN — ACETAMINOPHEN 650 MG: 325 TABLET, FILM COATED ORAL at 20:43

## 2022-10-18 RX ADMIN — INSULIN LISPRO 5 UNITS: 100 INJECTION, SOLUTION INTRAVENOUS; SUBCUTANEOUS at 08:03

## 2022-10-18 NOTE — ANESTHESIA POSTPROCEDURE EVALUATION
Patient: Omkar Nava    Procedure Summary     Date: 10/18/22 Room / Location:  [facility]    Anesthesia Start: 1524 Anesthesia Stop:     Procedure: AMPUTATION FOOT (Left: Foot) Diagnosis:       Acute osteomyelitis of left foot (HCC)      (Acute osteomyelitis of left foot (HCC) [M86.172])    Surgeons: Eduardo Lieberman MD Provider: Sarkis Conway MD    Anesthesia Type: general with block ASA Status: 3          Anesthesia Type: general with block    Vitals  Vitals Value Taken Time   BP     Temp     Pulse     Resp     SpO2 95 % 10/18/22 7119   Vitals shown include unvalidated device data.        Post Anesthesia Care and Evaluation    Patient location during evaluation: PACU  Patient participation: complete - patient participated  Level of consciousness: awake and alert  Pain management: adequate    Airway patency: patent  Anesthetic complications: No anesthetic complications  PONV Status: none  Cardiovascular status: hemodynamically stable and acceptable  Respiratory status: nonlabored ventilation, acceptable and nasal cannula  Hydration status: acceptable

## 2022-10-18 NOTE — ANESTHESIA PREPROCEDURE EVALUATION
Anesthesia Evaluation     Patient summary reviewed and Nursing notes reviewed   NPO Solid Status: > 8 hours  NPO Liquid Status: > 8 hours           Airway   Mallampati: III  TM distance: >3 FB  Neck ROM: limited  Possible difficult intubation  Dental - normal exam     Pulmonary    (+) decreased breath sounds,   Cardiovascular   Exercise tolerance: poor (<4 METS)    Rhythm: regular  Rate: normal        Neuro/Psych  GI/Hepatic/Renal/Endo      Musculoskeletal     Abdominal    Substance History      OB/GYN          Other                        Anesthesia Plan    ASA 3     general with block     intravenous induction     Anesthetic plan, risks, benefits, and alternatives have been provided, discussed and informed consent has been obtained with: patient.        CODE STATUS:    Level Of Support Discussed With: Patient  Code Status (Patient has no pulse and is not breathing): CPR (Attempt to Resuscitate)  Medical Interventions (Patient has pulse or is breathing): Full Support

## 2022-10-18 NOTE — ANESTHESIA PROCEDURE NOTES
Peripheral Block      Patient reassessed immediately prior to procedure    Patient location during procedure: pre-op  Reason for block: at surgeon's request and post-op pain management  Performed by  Anesthesiologist: Gualberto Lee MD  CRNA/CAA: Barney Cox CRNA  Assisted by: Bela Cervantes RN  Preanesthetic Checklist  Completed: patient identified, IV checked, site marked, risks and benefits discussed, surgical consent, monitors and equipment checked, pre-op evaluation and timeout performed  Prep:  Sterile barriers:cap, gloves, mask and washed/disinfected hands  Prep: ChloraPrep  Patient monitoring: blood pressure monitoring, continuous pulse oximetry and EKG  Procedure    Sedation: yes  Performed under: local infiltration  Guidance:ultrasound guided    ULTRASOUND INTERPRETATION.  Using ultrasound guidance a 20 G gauge needle was placed in close proximity to the nerve, at which point, under ultrasound guidance anesthetic was injected in the area of the nerve and spread of the anesthesia was seen on ultrasound in close proximity thereto.  There were no abnormalities seen on ultrasound; a digital image was taken; and the patient tolerated the procedure with no complications. Images:still images obtained, printed/placed on chart    Block Type:popliteal  Injection Technique:single-shot  Needle Type:echogenic and Tuohy  Needle Gauge:18 G  Resistance on Injection: none  Catheter Size:20 G    Medications Used: fentaNYL citrate (PF) (SUBLIMAZE) injection - Intravenous   100 mcg - 10/18/2022 1:29:00 PM  dexamethasone sodium phosphate injection - Injection   2 mg - 10/18/2022 1:29:00 PM  bupivacaine PF (MARCAINE) 0.25 % injection - Injection   30 mL - 10/18/2022 1:29:00 PM      Post Assessment  Injection Assessment: negative aspiration for heme, no paresthesia on injection and incremental injection  Patient Tolerance:comfortable throughout block  Complications:no  Additional Notes  SINGLE shot    A high-frequency linear  "transducer, with sterile cover, was placed in the popliteal fossa to identify the popliteal artery and vein, Tibial nerve (TN) and Common Peroneal nerve (CP). The transducer was then moved in a cephalad fashion to observe the TN and CP nerve bifurcation to form the Sciatic Nerve. The insertion site was prepped and draped in sterile fashion. Skin and cutaneous tissue was infiltrated with 2-5 ml of 1% Lidocaine. Using ultrasound-guidance, a 20-gauge B-Rivera 4\" Ultraplex 360 non-stimulating echogenic needle was then inserted and advanced in plane from lateral to medial. Preservative-free normal saline was utilized for hydro-dissection of tissue, advancement of Touhy, and to confirm final needle placement posterior to the nerves. Local anesthetic injection spread, in incremental 3-5 ml injections, to surround both nerve structures. Aspiration every 5 ml to prevent intravascular injection. Injection was completed with negative aspiration of blood and negative intravascular injection. Injection pressures were normal with minimal resistance.            "

## 2022-10-18 NOTE — ANESTHESIA PROCEDURE NOTES
Airway  Urgency: elective    Airway not difficult    General Information and Staff    Patient location during procedure: OR  CRNA/CAA: Sarkis Fraga CRNA    Indications and Patient Condition  Indications for airway management: airway protection    Preoxygenated: yes  MILS not maintained throughout  Mask difficulty assessment: 2 - vent by mask + OA or adjuvant +/- NMBA    Final Airway Details  Final airway type: endotracheal airway      Successful airway: ETT  Cuffed: yes   Successful intubation technique: direct laryngoscopy  Endotracheal tube insertion site: oral  Blade: Jaxon  Blade size: 3  ETT size (mm): 7.0  Cormack-Lehane Classification: grade I - full view of glottis  Placement verified by: chest auscultation and capnometry   Measured from: lips  ETT/EBT  to lips (cm): 24  Number of attempts at approach: 1  Assessment: lips, teeth, and gum same as pre-op and atraumatic intubation    Additional Comments  Negative epigastric sounds, Breath sound equal bilaterally with symmetric chest rise and fall

## 2022-10-19 LAB
ALBUMIN SERPL-MCNC: 2.7 G/DL (ref 3.5–5.2)
ALBUMIN/GLOB SERPL: 0.7 G/DL
ALP SERPL-CCNC: 97 U/L (ref 39–117)
ALT SERPL W P-5'-P-CCNC: 18 U/L (ref 1–41)
ANION GAP SERPL CALCULATED.3IONS-SCNC: 9 MMOL/L (ref 5–15)
AST SERPL-CCNC: 12 U/L (ref 1–40)
BACTERIA SPEC AEROBE CULT: NORMAL
BACTERIA SPEC AEROBE CULT: NORMAL
BASOPHILS # BLD AUTO: 0.03 10*3/MM3 (ref 0–0.2)
BASOPHILS NFR BLD AUTO: 0.3 % (ref 0–1.5)
BILIRUB SERPL-MCNC: <0.2 MG/DL (ref 0–1.2)
BUN SERPL-MCNC: 17 MG/DL (ref 8–23)
BUN/CREAT SERPL: 16 (ref 7–25)
CALCIUM SPEC-SCNC: 8.7 MG/DL (ref 8.6–10.5)
CHLORIDE SERPL-SCNC: 105 MMOL/L (ref 98–107)
CK SERPL-CCNC: 119 U/L (ref 20–200)
CO2 SERPL-SCNC: 25 MMOL/L (ref 22–29)
CREAT SERPL-MCNC: 1.06 MG/DL (ref 0.76–1.27)
DEPRECATED RDW RBC AUTO: 48.3 FL (ref 37–54)
EGFRCR SERPLBLD CKD-EPI 2021: 77.9 ML/MIN/1.73
EOSINOPHIL # BLD AUTO: 0.15 10*3/MM3 (ref 0–0.4)
EOSINOPHIL NFR BLD AUTO: 1.4 % (ref 0.3–6.2)
ERYTHROCYTE [DISTWIDTH] IN BLOOD BY AUTOMATED COUNT: 14.2 % (ref 12.3–15.4)
GLOBULIN UR ELPH-MCNC: 3.9 GM/DL
GLUCOSE BLDC GLUCOMTR-MCNC: 319 MG/DL (ref 70–130)
GLUCOSE BLDC GLUCOMTR-MCNC: 321 MG/DL (ref 70–130)
GLUCOSE BLDC GLUCOMTR-MCNC: 368 MG/DL (ref 70–130)
GLUCOSE SERPL-MCNC: 210 MG/DL (ref 65–99)
HCT VFR BLD AUTO: 25.9 % (ref 37.5–51)
HGB BLD-MCNC: 7.8 G/DL (ref 13–17.7)
IMM GRANULOCYTES # BLD AUTO: 0.04 10*3/MM3 (ref 0–0.05)
IMM GRANULOCYTES NFR BLD AUTO: 0.4 % (ref 0–0.5)
LYMPHOCYTES # BLD AUTO: 1.52 10*3/MM3 (ref 0.7–3.1)
LYMPHOCYTES NFR BLD AUTO: 14.5 % (ref 19.6–45.3)
MAGNESIUM SERPL-MCNC: 2.1 MG/DL (ref 1.6–2.4)
MCH RBC QN AUTO: 28.1 PG (ref 26.6–33)
MCHC RBC AUTO-ENTMCNC: 30.1 G/DL (ref 31.5–35.7)
MCV RBC AUTO: 93.2 FL (ref 79–97)
MONOCYTES # BLD AUTO: 0.94 10*3/MM3 (ref 0.1–0.9)
MONOCYTES NFR BLD AUTO: 9 % (ref 5–12)
NEUTROPHILS NFR BLD AUTO: 7.81 10*3/MM3 (ref 1.7–7)
NEUTROPHILS NFR BLD AUTO: 74.4 % (ref 42.7–76)
NRBC BLD AUTO-RTO: 0 /100 WBC (ref 0–0.2)
PLATELET # BLD AUTO: 327 10*3/MM3 (ref 140–450)
PMV BLD AUTO: 10 FL (ref 6–12)
POTASSIUM SERPL-SCNC: 4.4 MMOL/L (ref 3.5–5.2)
PROT SERPL-MCNC: 6.6 G/DL (ref 6–8.5)
RBC # BLD AUTO: 2.78 10*6/MM3 (ref 4.14–5.8)
SODIUM SERPL-SCNC: 139 MMOL/L (ref 136–145)
WBC NRBC COR # BLD: 10.49 10*3/MM3 (ref 3.4–10.8)

## 2022-10-19 PROCEDURE — 99232 SBSQ HOSP IP/OBS MODERATE 35: CPT | Performed by: INTERNAL MEDICINE

## 2022-10-19 PROCEDURE — 25010000002 VANCOMYCIN PER 500 MG: Performed by: ORTHOPAEDIC SURGERY

## 2022-10-19 PROCEDURE — 83735 ASSAY OF MAGNESIUM: CPT | Performed by: ORTHOPAEDIC SURGERY

## 2022-10-19 PROCEDURE — 25010000002 HEPARIN (PORCINE) PER 1000 UNITS: Performed by: ORTHOPAEDIC SURGERY

## 2022-10-19 PROCEDURE — 63710000001 INSULIN LISPRO (HUMAN) PER 5 UNITS: Performed by: ORTHOPAEDIC SURGERY

## 2022-10-19 PROCEDURE — 25010000002 NA FERRIC GLUC CPLX PER 12.5 MG: Performed by: INTERNAL MEDICINE

## 2022-10-19 PROCEDURE — 99024 POSTOP FOLLOW-UP VISIT: CPT | Performed by: ORTHOPAEDIC SURGERY

## 2022-10-19 PROCEDURE — 25010000002 CEFEPIME PER 500 MG: Performed by: ORTHOPAEDIC SURGERY

## 2022-10-19 PROCEDURE — 82962 GLUCOSE BLOOD TEST: CPT

## 2022-10-19 PROCEDURE — 97166 OT EVAL MOD COMPLEX 45 MIN: CPT

## 2022-10-19 PROCEDURE — 63710000001 INSULIN DETEMIR PER 5 UNITS: Performed by: ORTHOPAEDIC SURGERY

## 2022-10-19 PROCEDURE — 80053 COMPREHEN METABOLIC PANEL: CPT | Performed by: ORTHOPAEDIC SURGERY

## 2022-10-19 PROCEDURE — 97162 PT EVAL MOD COMPLEX 30 MIN: CPT

## 2022-10-19 PROCEDURE — 85025 COMPLETE CBC W/AUTO DIFF WBC: CPT | Performed by: ORTHOPAEDIC SURGERY

## 2022-10-19 PROCEDURE — 82550 ASSAY OF CK (CPK): CPT | Performed by: ORTHOPAEDIC SURGERY

## 2022-10-19 RX ORDER — HEPARIN SODIUM 5000 [USP'U]/ML
5000 INJECTION, SOLUTION INTRAVENOUS; SUBCUTANEOUS EVERY 8 HOURS SCHEDULED
Status: DISCONTINUED | OUTPATIENT
Start: 2022-10-19 | End: 2022-11-16 | Stop reason: HOSPADM

## 2022-10-19 RX ADMIN — LISINOPRIL 30 MG: 20 TABLET ORAL at 08:19

## 2022-10-19 RX ADMIN — HEPARIN SODIUM 5000 UNITS: 5000 INJECTION INTRAVENOUS; SUBCUTANEOUS at 21:03

## 2022-10-19 RX ADMIN — INSULIN LISPRO 6 UNITS: 100 INJECTION, SOLUTION INTRAVENOUS; SUBCUTANEOUS at 08:18

## 2022-10-19 RX ADMIN — INSULIN DETEMIR 5 UNITS: 100 INJECTION, SOLUTION SUBCUTANEOUS at 21:03

## 2022-10-19 RX ADMIN — OLANZAPINE 5 MG: 5 TABLET, FILM COATED ORAL at 09:05

## 2022-10-19 RX ADMIN — SENNOSIDES AND DOCUSATE SODIUM 2 TABLET: 50; 8.6 TABLET ORAL at 21:04

## 2022-10-19 RX ADMIN — FERROUS SULFATE TAB 325 MG (65 MG ELEMENTAL FE) 325 MG: 325 (65 FE) TAB at 08:19

## 2022-10-19 RX ADMIN — INSULIN LISPRO 5 UNITS: 100 INJECTION, SOLUTION INTRAVENOUS; SUBCUTANEOUS at 08:20

## 2022-10-19 RX ADMIN — CEFEPIME HYDROCHLORIDE 2 G: 2 INJECTION, POWDER, FOR SOLUTION INTRAMUSCULAR; INTRAVENOUS at 10:29

## 2022-10-19 RX ADMIN — INSULIN LISPRO 5 UNITS: 100 INJECTION, SOLUTION INTRAVENOUS; SUBCUTANEOUS at 17:49

## 2022-10-19 RX ADMIN — ACETAMINOPHEN 650 MG: 325 TABLET, FILM COATED ORAL at 21:04

## 2022-10-19 RX ADMIN — CEFEPIME HYDROCHLORIDE 2 G: 2 INJECTION, POWDER, FOR SOLUTION INTRAMUSCULAR; INTRAVENOUS at 02:25

## 2022-10-19 RX ADMIN — HEPARIN SODIUM 5000 UNITS: 5000 INJECTION INTRAVENOUS; SUBCUTANEOUS at 14:52

## 2022-10-19 RX ADMIN — SODIUM CHLORIDE 125 MG: 9 INJECTION, SOLUTION INTRAVENOUS at 09:05

## 2022-10-19 RX ADMIN — SODIUM CHLORIDE 100 ML/HR: 9 INJECTION, SOLUTION INTRAVENOUS at 23:38

## 2022-10-19 RX ADMIN — Medication 10 ML: at 21:11

## 2022-10-19 RX ADMIN — CEFEPIME HYDROCHLORIDE 2 G: 2 INJECTION, POWDER, FOR SOLUTION INTRAMUSCULAR; INTRAVENOUS at 17:49

## 2022-10-19 RX ADMIN — INSULIN LISPRO 5 UNITS: 100 INJECTION, SOLUTION INTRAVENOUS; SUBCUTANEOUS at 12:02

## 2022-10-19 RX ADMIN — OLANZAPINE 5 MG: 5 TABLET, FILM COATED ORAL at 21:15

## 2022-10-19 RX ADMIN — VANCOMYCIN HYDROCHLORIDE 750 MG: 750 INJECTION, SOLUTION INTRAVENOUS at 23:50

## 2022-10-19 RX ADMIN — VANCOMYCIN HYDROCHLORIDE 750 MG: 750 INJECTION, SOLUTION INTRAVENOUS at 14:52

## 2022-10-19 RX ADMIN — VANCOMYCIN HYDROCHLORIDE 750 MG: 750 INJECTION, SOLUTION INTRAVENOUS at 00:44

## 2022-10-19 RX ADMIN — Medication 10 ML: at 08:19

## 2022-10-19 RX ADMIN — INSULIN LISPRO 5 UNITS: 100 INJECTION, SOLUTION INTRAVENOUS; SUBCUTANEOUS at 12:03

## 2022-10-19 RX ADMIN — Medication 5 MG: at 21:04

## 2022-10-20 LAB
ALBUMIN SERPL-MCNC: 2.6 G/DL (ref 3.5–5.2)
ALBUMIN/GLOB SERPL: 0.7 G/DL
ALP SERPL-CCNC: 86 U/L (ref 39–117)
ALT SERPL W P-5'-P-CCNC: 17 U/L (ref 1–41)
ANION GAP SERPL CALCULATED.3IONS-SCNC: 6 MMOL/L (ref 5–15)
AST SERPL-CCNC: 12 U/L (ref 1–40)
BASOPHILS # BLD AUTO: 0.03 10*3/MM3 (ref 0–0.2)
BASOPHILS NFR BLD AUTO: 0.3 % (ref 0–1.5)
BILIRUB SERPL-MCNC: <0.2 MG/DL (ref 0–1.2)
BUN SERPL-MCNC: 18 MG/DL (ref 8–23)
BUN/CREAT SERPL: 20.9 (ref 7–25)
CALCIUM SPEC-SCNC: 8.3 MG/DL (ref 8.6–10.5)
CHLORIDE SERPL-SCNC: 106 MMOL/L (ref 98–107)
CO2 SERPL-SCNC: 26 MMOL/L (ref 22–29)
CREAT SERPL-MCNC: 0.86 MG/DL (ref 0.76–1.27)
DEPRECATED RDW RBC AUTO: 48 FL (ref 37–54)
EGFRCR SERPLBLD CKD-EPI 2021: 96.1 ML/MIN/1.73
EOSINOPHIL # BLD AUTO: 0.25 10*3/MM3 (ref 0–0.4)
EOSINOPHIL NFR BLD AUTO: 2.6 % (ref 0.3–6.2)
ERYTHROCYTE [DISTWIDTH] IN BLOOD BY AUTOMATED COUNT: 14.3 % (ref 12.3–15.4)
GLOBULIN UR ELPH-MCNC: 3.7 GM/DL
GLUCOSE BLDC GLUCOMTR-MCNC: 164 MG/DL (ref 70–130)
GLUCOSE BLDC GLUCOMTR-MCNC: 193 MG/DL (ref 70–130)
GLUCOSE BLDC GLUCOMTR-MCNC: 246 MG/DL (ref 70–130)
GLUCOSE SERPL-MCNC: 242 MG/DL (ref 65–99)
HCT VFR BLD AUTO: 24 % (ref 37.5–51)
HGB BLD-MCNC: 7.5 G/DL (ref 13–17.7)
IMM GRANULOCYTES # BLD AUTO: 0.05 10*3/MM3 (ref 0–0.05)
IMM GRANULOCYTES NFR BLD AUTO: 0.5 % (ref 0–0.5)
LYMPHOCYTES # BLD AUTO: 1.9 10*3/MM3 (ref 0.7–3.1)
LYMPHOCYTES NFR BLD AUTO: 19.6 % (ref 19.6–45.3)
MCH RBC QN AUTO: 28.7 PG (ref 26.6–33)
MCHC RBC AUTO-ENTMCNC: 31.3 G/DL (ref 31.5–35.7)
MCV RBC AUTO: 92 FL (ref 79–97)
MONOCYTES # BLD AUTO: 0.98 10*3/MM3 (ref 0.1–0.9)
MONOCYTES NFR BLD AUTO: 10.1 % (ref 5–12)
NEUTROPHILS NFR BLD AUTO: 6.48 10*3/MM3 (ref 1.7–7)
NEUTROPHILS NFR BLD AUTO: 66.9 % (ref 42.7–76)
NRBC BLD AUTO-RTO: 0 /100 WBC (ref 0–0.2)
PLATELET # BLD AUTO: 270 10*3/MM3 (ref 140–450)
PMV BLD AUTO: 10.2 FL (ref 6–12)
POTASSIUM SERPL-SCNC: 4.1 MMOL/L (ref 3.5–5.2)
PROT SERPL-MCNC: 6.3 G/DL (ref 6–8.5)
RBC # BLD AUTO: 2.61 10*6/MM3 (ref 4.14–5.8)
SODIUM SERPL-SCNC: 138 MMOL/L (ref 136–145)
VANCOMYCIN SERPL-MCNC: 21.2 MCG/ML (ref 5–40)
WBC NRBC COR # BLD: 9.69 10*3/MM3 (ref 3.4–10.8)

## 2022-10-20 PROCEDURE — 25010000002 VANCOMYCIN PER 500 MG: Performed by: ORTHOPAEDIC SURGERY

## 2022-10-20 PROCEDURE — 99232 SBSQ HOSP IP/OBS MODERATE 35: CPT | Performed by: INTERNAL MEDICINE

## 2022-10-20 PROCEDURE — 63710000001 INSULIN LISPRO (HUMAN) PER 5 UNITS: Performed by: ORTHOPAEDIC SURGERY

## 2022-10-20 PROCEDURE — 85025 COMPLETE CBC W/AUTO DIFF WBC: CPT | Performed by: INTERNAL MEDICINE

## 2022-10-20 PROCEDURE — 25010000002 CEFTRIAXONE PER 250 MG: Performed by: INTERNAL MEDICINE

## 2022-10-20 PROCEDURE — 63710000001 INSULIN DETEMIR PER 5 UNITS: Performed by: ORTHOPAEDIC SURGERY

## 2022-10-20 PROCEDURE — 80202 ASSAY OF VANCOMYCIN: CPT | Performed by: ORTHOPAEDIC SURGERY

## 2022-10-20 PROCEDURE — 25010000002 CEFEPIME PER 500 MG: Performed by: ORTHOPAEDIC SURGERY

## 2022-10-20 PROCEDURE — 80053 COMPREHEN METABOLIC PANEL: CPT | Performed by: INTERNAL MEDICINE

## 2022-10-20 PROCEDURE — 25010000002 HEPARIN (PORCINE) PER 1000 UNITS: Performed by: ORTHOPAEDIC SURGERY

## 2022-10-20 PROCEDURE — 82962 GLUCOSE BLOOD TEST: CPT

## 2022-10-20 PROCEDURE — 25010000002 NA FERRIC GLUC CPLX PER 12.5 MG: Performed by: INTERNAL MEDICINE

## 2022-10-20 RX ADMIN — FERROUS SULFATE TAB 325 MG (65 MG ELEMENTAL FE) 325 MG: 325 (65 FE) TAB at 09:18

## 2022-10-20 RX ADMIN — INSULIN LISPRO 2 UNITS: 100 INJECTION, SOLUTION INTRAVENOUS; SUBCUTANEOUS at 18:10

## 2022-10-20 RX ADMIN — INSULIN LISPRO 5 UNITS: 100 INJECTION, SOLUTION INTRAVENOUS; SUBCUTANEOUS at 09:22

## 2022-10-20 RX ADMIN — INSULIN LISPRO 5 UNITS: 100 INJECTION, SOLUTION INTRAVENOUS; SUBCUTANEOUS at 11:35

## 2022-10-20 RX ADMIN — CEFEPIME HYDROCHLORIDE 2 G: 2 INJECTION, POWDER, FOR SOLUTION INTRAMUSCULAR; INTRAVENOUS at 02:21

## 2022-10-20 RX ADMIN — VANCOMYCIN HYDROCHLORIDE 750 MG: 750 INJECTION, SOLUTION INTRAVENOUS at 23:44

## 2022-10-20 RX ADMIN — Medication 10 ML: at 09:22

## 2022-10-20 RX ADMIN — VANCOMYCIN HYDROCHLORIDE 750 MG: 750 INJECTION, SOLUTION INTRAVENOUS at 14:03

## 2022-10-20 RX ADMIN — SODIUM CHLORIDE 125 MG: 9 INJECTION, SOLUTION INTRAVENOUS at 09:22

## 2022-10-20 RX ADMIN — SODIUM CHLORIDE 2 G: 900 INJECTION INTRAVENOUS at 11:34

## 2022-10-20 RX ADMIN — INSULIN LISPRO 3 UNITS: 100 INJECTION, SOLUTION INTRAVENOUS; SUBCUTANEOUS at 09:25

## 2022-10-20 RX ADMIN — OLANZAPINE 5 MG: 5 TABLET, FILM COATED ORAL at 09:20

## 2022-10-20 RX ADMIN — INSULIN LISPRO 5 UNITS: 100 INJECTION, SOLUTION INTRAVENOUS; SUBCUTANEOUS at 18:10

## 2022-10-20 RX ADMIN — INSULIN LISPRO 2 UNITS: 100 INJECTION, SOLUTION INTRAVENOUS; SUBCUTANEOUS at 11:35

## 2022-10-20 RX ADMIN — LISINOPRIL 30 MG: 20 TABLET ORAL at 09:18

## 2022-10-20 RX ADMIN — HEPARIN SODIUM 5000 UNITS: 5000 INJECTION INTRAVENOUS; SUBCUTANEOUS at 14:02

## 2022-10-20 RX ADMIN — HEPARIN SODIUM 5000 UNITS: 5000 INJECTION INTRAVENOUS; SUBCUTANEOUS at 05:35

## 2022-10-20 RX ADMIN — Medication 10 ML: at 21:39

## 2022-10-21 LAB
ANION GAP SERPL CALCULATED.3IONS-SCNC: 7 MMOL/L (ref 5–15)
BACTERIA SPEC AEROBE CULT: ABNORMAL
BACTERIA SPEC AEROBE CULT: NORMAL
BUN SERPL-MCNC: 15 MG/DL (ref 8–23)
BUN/CREAT SERPL: 17.2 (ref 7–25)
CALCIUM SPEC-SCNC: 8.5 MG/DL (ref 8.6–10.5)
CHLORIDE SERPL-SCNC: 102 MMOL/L (ref 98–107)
CO2 SERPL-SCNC: 25 MMOL/L (ref 22–29)
CREAT SERPL-MCNC: 0.87 MG/DL (ref 0.76–1.27)
CYTO UR: NORMAL
DEPRECATED RDW RBC AUTO: 46.5 FL (ref 37–54)
EGFRCR SERPLBLD CKD-EPI 2021: 95.8 ML/MIN/1.73
ERYTHROCYTE [DISTWIDTH] IN BLOOD BY AUTOMATED COUNT: 14.4 % (ref 12.3–15.4)
GLUCOSE BLDC GLUCOMTR-MCNC: 271 MG/DL (ref 70–130)
GLUCOSE BLDC GLUCOMTR-MCNC: 327 MG/DL (ref 70–130)
GLUCOSE BLDC GLUCOMTR-MCNC: 332 MG/DL (ref 70–130)
GLUCOSE SERPL-MCNC: 271 MG/DL (ref 65–99)
GRAM STN SPEC: ABNORMAL
GRAM STN SPEC: ABNORMAL
GRAM STN SPEC: NORMAL
GRAM STN SPEC: NORMAL
HCT VFR BLD AUTO: 24.2 % (ref 37.5–51)
HGB BLD-MCNC: 7.6 G/DL (ref 13–17.7)
LAB AP CASE REPORT: NORMAL
LAB AP CLINICAL INFORMATION: NORMAL
MAGNESIUM SERPL-MCNC: 2 MG/DL (ref 1.6–2.4)
MCH RBC QN AUTO: 28 PG (ref 26.6–33)
MCHC RBC AUTO-ENTMCNC: 31.4 G/DL (ref 31.5–35.7)
MCV RBC AUTO: 89.3 FL (ref 79–97)
PATH REPORT.FINAL DX SPEC: NORMAL
PATH REPORT.GROSS SPEC: NORMAL
PLATELET # BLD AUTO: 267 10*3/MM3 (ref 140–450)
PMV BLD AUTO: 10.5 FL (ref 6–12)
POTASSIUM SERPL-SCNC: 4.3 MMOL/L (ref 3.5–5.2)
RBC # BLD AUTO: 2.71 10*6/MM3 (ref 4.14–5.8)
SODIUM SERPL-SCNC: 134 MMOL/L (ref 136–145)
WBC NRBC COR # BLD: 9.51 10*3/MM3 (ref 3.4–10.8)

## 2022-10-21 PROCEDURE — 85027 COMPLETE CBC AUTOMATED: CPT | Performed by: INTERNAL MEDICINE

## 2022-10-21 PROCEDURE — 99232 SBSQ HOSP IP/OBS MODERATE 35: CPT | Performed by: INTERNAL MEDICINE

## 2022-10-21 PROCEDURE — 80048 BASIC METABOLIC PNL TOTAL CA: CPT | Performed by: INTERNAL MEDICINE

## 2022-10-21 PROCEDURE — 25010000002 HEPARIN (PORCINE) PER 1000 UNITS: Performed by: ORTHOPAEDIC SURGERY

## 2022-10-21 PROCEDURE — 99024 POSTOP FOLLOW-UP VISIT: CPT | Performed by: ORTHOPAEDIC SURGERY

## 2022-10-21 PROCEDURE — 82962 GLUCOSE BLOOD TEST: CPT

## 2022-10-21 PROCEDURE — 63710000001 INSULIN LISPRO (HUMAN) PER 5 UNITS: Performed by: ORTHOPAEDIC SURGERY

## 2022-10-21 PROCEDURE — 63710000001 INSULIN DETEMIR PER 5 UNITS: Performed by: INTERNAL MEDICINE

## 2022-10-21 PROCEDURE — 25010000002 CEFTRIAXONE PER 250 MG: Performed by: INTERNAL MEDICINE

## 2022-10-21 PROCEDURE — 83735 ASSAY OF MAGNESIUM: CPT | Performed by: INTERNAL MEDICINE

## 2022-10-21 PROCEDURE — 25010000002 VANCOMYCIN PER 500 MG: Performed by: ORTHOPAEDIC SURGERY

## 2022-10-21 PROCEDURE — 63710000001 INSULIN LISPRO (HUMAN) PER 5 UNITS: Performed by: INTERNAL MEDICINE

## 2022-10-21 RX ORDER — INSULIN LISPRO 100 [IU]/ML
6 INJECTION, SOLUTION INTRAVENOUS; SUBCUTANEOUS
Status: DISCONTINUED | OUTPATIENT
Start: 2022-10-21 | End: 2022-10-24

## 2022-10-21 RX ORDER — AMLODIPINE BESYLATE 2.5 MG/1
2.5 TABLET ORAL
Status: DISCONTINUED | OUTPATIENT
Start: 2022-10-21 | End: 2022-10-23

## 2022-10-21 RX ADMIN — OLANZAPINE 5 MG: 5 TABLET, FILM COATED ORAL at 09:01

## 2022-10-21 RX ADMIN — INSULIN LISPRO 5 UNITS: 100 INJECTION, SOLUTION INTRAVENOUS; SUBCUTANEOUS at 18:39

## 2022-10-21 RX ADMIN — OLANZAPINE 5 MG: 5 TABLET, FILM COATED ORAL at 21:08

## 2022-10-21 RX ADMIN — INSULIN LISPRO 4 UNITS: 100 INJECTION, SOLUTION INTRAVENOUS; SUBCUTANEOUS at 09:02

## 2022-10-21 RX ADMIN — HEPARIN SODIUM 5000 UNITS: 5000 INJECTION INTRAVENOUS; SUBCUTANEOUS at 06:30

## 2022-10-21 RX ADMIN — FERROUS SULFATE TAB 325 MG (65 MG ELEMENTAL FE) 325 MG: 325 (65 FE) TAB at 09:01

## 2022-10-21 RX ADMIN — INSULIN LISPRO 5 UNITS: 100 INJECTION, SOLUTION INTRAVENOUS; SUBCUTANEOUS at 11:28

## 2022-10-21 RX ADMIN — LISINOPRIL 30 MG: 20 TABLET ORAL at 09:01

## 2022-10-21 RX ADMIN — INSULIN LISPRO 6 UNITS: 100 INJECTION, SOLUTION INTRAVENOUS; SUBCUTANEOUS at 18:39

## 2022-10-21 RX ADMIN — Medication 10 ML: at 11:28

## 2022-10-21 RX ADMIN — ACETAMINOPHEN 650 MG: 325 TABLET, FILM COATED ORAL at 09:01

## 2022-10-21 RX ADMIN — INSULIN DETEMIR 8 UNITS: 100 INJECTION, SOLUTION SUBCUTANEOUS at 21:08

## 2022-10-21 RX ADMIN — Medication 10 ML: at 21:10

## 2022-10-21 RX ADMIN — HEPARIN SODIUM 5000 UNITS: 5000 INJECTION INTRAVENOUS; SUBCUTANEOUS at 14:43

## 2022-10-21 RX ADMIN — SODIUM CHLORIDE 2 G: 900 INJECTION INTRAVENOUS at 11:28

## 2022-10-21 RX ADMIN — AMLODIPINE BESYLATE 2.5 MG: 2.5 TABLET ORAL at 09:01

## 2022-10-21 RX ADMIN — HEPARIN SODIUM 5000 UNITS: 5000 INJECTION INTRAVENOUS; SUBCUTANEOUS at 21:08

## 2022-10-21 RX ADMIN — SENNOSIDES AND DOCUSATE SODIUM 2 TABLET: 50; 8.6 TABLET ORAL at 21:08

## 2022-10-21 RX ADMIN — INSULIN LISPRO 5 UNITS: 100 INJECTION, SOLUTION INTRAVENOUS; SUBCUTANEOUS at 09:01

## 2022-10-21 RX ADMIN — INSULIN LISPRO 5 UNITS: 100 INJECTION, SOLUTION INTRAVENOUS; SUBCUTANEOUS at 11:27

## 2022-10-21 RX ADMIN — VANCOMYCIN HYDROCHLORIDE 750 MG: 750 INJECTION, SOLUTION INTRAVENOUS at 12:06

## 2022-10-22 LAB
ANION GAP SERPL CALCULATED.3IONS-SCNC: 9 MMOL/L (ref 5–15)
BUN SERPL-MCNC: 11 MG/DL (ref 8–23)
BUN/CREAT SERPL: 14.7 (ref 7–25)
CALCIUM SPEC-SCNC: 8.8 MG/DL (ref 8.6–10.5)
CHLORIDE SERPL-SCNC: 104 MMOL/L (ref 98–107)
CO2 SERPL-SCNC: 25 MMOL/L (ref 22–29)
CREAT SERPL-MCNC: 0.75 MG/DL (ref 0.76–1.27)
DEPRECATED RDW RBC AUTO: 47.7 FL (ref 37–54)
EGFRCR SERPLBLD CKD-EPI 2021: 100.1 ML/MIN/1.73
ERYTHROCYTE [DISTWIDTH] IN BLOOD BY AUTOMATED COUNT: 14.2 % (ref 12.3–15.4)
GLUCOSE BLDC GLUCOMTR-MCNC: 157 MG/DL (ref 70–130)
GLUCOSE BLDC GLUCOMTR-MCNC: 172 MG/DL (ref 70–130)
GLUCOSE BLDC GLUCOMTR-MCNC: 190 MG/DL (ref 70–130)
GLUCOSE SERPL-MCNC: 151 MG/DL (ref 65–99)
HCT VFR BLD AUTO: 26.7 % (ref 37.5–51)
HGB BLD-MCNC: 8.2 G/DL (ref 13–17.7)
MCH RBC QN AUTO: 28.4 PG (ref 26.6–33)
MCHC RBC AUTO-ENTMCNC: 30.7 G/DL (ref 31.5–35.7)
MCV RBC AUTO: 92.4 FL (ref 79–97)
PLATELET # BLD AUTO: 261 10*3/MM3 (ref 140–450)
PMV BLD AUTO: 10.5 FL (ref 6–12)
POTASSIUM SERPL-SCNC: 4.1 MMOL/L (ref 3.5–5.2)
RBC # BLD AUTO: 2.89 10*6/MM3 (ref 4.14–5.8)
SODIUM SERPL-SCNC: 138 MMOL/L (ref 136–145)
VANCOMYCIN SERPL-MCNC: 18.1 MCG/ML (ref 5–40)
WBC NRBC COR # BLD: 9.33 10*3/MM3 (ref 3.4–10.8)

## 2022-10-22 PROCEDURE — 80048 BASIC METABOLIC PNL TOTAL CA: CPT

## 2022-10-22 PROCEDURE — 63710000001 INSULIN LISPRO (HUMAN) PER 5 UNITS: Performed by: ORTHOPAEDIC SURGERY

## 2022-10-22 PROCEDURE — 25010000002 CEFTRIAXONE PER 250 MG: Performed by: INTERNAL MEDICINE

## 2022-10-22 PROCEDURE — 85027 COMPLETE CBC AUTOMATED: CPT | Performed by: INTERNAL MEDICINE

## 2022-10-22 PROCEDURE — 99232 SBSQ HOSP IP/OBS MODERATE 35: CPT | Performed by: INTERNAL MEDICINE

## 2022-10-22 PROCEDURE — 25010000002 VANCOMYCIN PER 500 MG

## 2022-10-22 PROCEDURE — 82962 GLUCOSE BLOOD TEST: CPT

## 2022-10-22 PROCEDURE — 63710000001 INSULIN LISPRO (HUMAN) PER 5 UNITS: Performed by: INTERNAL MEDICINE

## 2022-10-22 PROCEDURE — 80202 ASSAY OF VANCOMYCIN: CPT

## 2022-10-22 PROCEDURE — 63710000001 INSULIN DETEMIR PER 5 UNITS: Performed by: INTERNAL MEDICINE

## 2022-10-22 PROCEDURE — 25010000002 HEPARIN (PORCINE) PER 1000 UNITS: Performed by: ORTHOPAEDIC SURGERY

## 2022-10-22 PROCEDURE — 25010000002 VANCOMYCIN PER 500 MG: Performed by: ORTHOPAEDIC SURGERY

## 2022-10-22 RX ORDER — VANCOMYCIN HYDROCHLORIDE 1 G/200ML
1000 INJECTION, SOLUTION INTRAVENOUS EVERY 12 HOURS
Status: DISCONTINUED | OUTPATIENT
Start: 2022-10-22 | End: 2022-10-31

## 2022-10-22 RX ADMIN — SENNOSIDES AND DOCUSATE SODIUM 2 TABLET: 50; 8.6 TABLET ORAL at 20:50

## 2022-10-22 RX ADMIN — OLANZAPINE 5 MG: 5 TABLET, FILM COATED ORAL at 07:55

## 2022-10-22 RX ADMIN — INSULIN LISPRO 2 UNITS: 100 INJECTION, SOLUTION INTRAVENOUS; SUBCUTANEOUS at 11:46

## 2022-10-22 RX ADMIN — INSULIN LISPRO 6 UNITS: 100 INJECTION, SOLUTION INTRAVENOUS; SUBCUTANEOUS at 16:58

## 2022-10-22 RX ADMIN — HEPARIN SODIUM 5000 UNITS: 5000 INJECTION INTRAVENOUS; SUBCUTANEOUS at 06:07

## 2022-10-22 RX ADMIN — Medication 10 ML: at 20:55

## 2022-10-22 RX ADMIN — Medication 10 ML: at 07:55

## 2022-10-22 RX ADMIN — INSULIN LISPRO 6 UNITS: 100 INJECTION, SOLUTION INTRAVENOUS; SUBCUTANEOUS at 11:46

## 2022-10-22 RX ADMIN — HEPARIN SODIUM 5000 UNITS: 5000 INJECTION INTRAVENOUS; SUBCUTANEOUS at 15:22

## 2022-10-22 RX ADMIN — INSULIN LISPRO 6 UNITS: 100 INJECTION, SOLUTION INTRAVENOUS; SUBCUTANEOUS at 07:55

## 2022-10-22 RX ADMIN — VANCOMYCIN HYDROCHLORIDE 1000 MG: 1 INJECTION, SOLUTION INTRAVENOUS at 23:41

## 2022-10-22 RX ADMIN — OLANZAPINE 5 MG: 5 TABLET, FILM COATED ORAL at 20:50

## 2022-10-22 RX ADMIN — FERROUS SULFATE TAB 325 MG (65 MG ELEMENTAL FE) 325 MG: 325 (65 FE) TAB at 07:55

## 2022-10-22 RX ADMIN — SODIUM CHLORIDE 2 G: 900 INJECTION INTRAVENOUS at 11:46

## 2022-10-22 RX ADMIN — HEPARIN SODIUM 5000 UNITS: 5000 INJECTION INTRAVENOUS; SUBCUTANEOUS at 20:50

## 2022-10-22 RX ADMIN — VANCOMYCIN HYDROCHLORIDE 1000 MG: 1 INJECTION, SOLUTION INTRAVENOUS at 12:49

## 2022-10-22 RX ADMIN — AMLODIPINE BESYLATE 2.5 MG: 2.5 TABLET ORAL at 07:55

## 2022-10-22 RX ADMIN — LISINOPRIL 30 MG: 20 TABLET ORAL at 07:55

## 2022-10-22 RX ADMIN — INSULIN LISPRO 2 UNITS: 100 INJECTION, SOLUTION INTRAVENOUS; SUBCUTANEOUS at 07:56

## 2022-10-22 RX ADMIN — INSULIN LISPRO 2 UNITS: 100 INJECTION, SOLUTION INTRAVENOUS; SUBCUTANEOUS at 16:58

## 2022-10-22 RX ADMIN — INSULIN DETEMIR 8 UNITS: 100 INJECTION, SOLUTION SUBCUTANEOUS at 20:51

## 2022-10-22 RX ADMIN — VANCOMYCIN HYDROCHLORIDE 750 MG: 750 INJECTION, SOLUTION INTRAVENOUS at 00:59

## 2022-10-22 RX ADMIN — ACETAMINOPHEN 650 MG: 325 TABLET, FILM COATED ORAL at 07:55

## 2022-10-23 LAB
BACTERIA SPEC ANAEROBE CULT: NORMAL
BACTERIA SPEC ANAEROBE CULT: NORMAL
GLUCOSE BLDC GLUCOMTR-MCNC: 168 MG/DL (ref 70–130)
GLUCOSE BLDC GLUCOMTR-MCNC: 184 MG/DL (ref 70–130)
GLUCOSE BLDC GLUCOMTR-MCNC: 210 MG/DL (ref 70–130)
GLUCOSE BLDC GLUCOMTR-MCNC: 253 MG/DL (ref 70–130)

## 2022-10-23 PROCEDURE — 82962 GLUCOSE BLOOD TEST: CPT

## 2022-10-23 PROCEDURE — 63710000001 INSULIN LISPRO (HUMAN) PER 5 UNITS: Performed by: INTERNAL MEDICINE

## 2022-10-23 PROCEDURE — 25010000002 CEFTRIAXONE PER 250 MG: Performed by: INTERNAL MEDICINE

## 2022-10-23 PROCEDURE — 25010000002 VANCOMYCIN PER 500 MG

## 2022-10-23 PROCEDURE — 63710000001 INSULIN LISPRO (HUMAN) PER 5 UNITS: Performed by: ORTHOPAEDIC SURGERY

## 2022-10-23 PROCEDURE — 99232 SBSQ HOSP IP/OBS MODERATE 35: CPT | Performed by: INTERNAL MEDICINE

## 2022-10-23 PROCEDURE — 25010000002 HEPARIN (PORCINE) PER 1000 UNITS: Performed by: ORTHOPAEDIC SURGERY

## 2022-10-23 PROCEDURE — 63710000001 INSULIN DETEMIR PER 5 UNITS: Performed by: INTERNAL MEDICINE

## 2022-10-23 RX ORDER — AMLODIPINE BESYLATE 10 MG/1
10 TABLET ORAL
Status: DISCONTINUED | OUTPATIENT
Start: 2022-10-24 | End: 2022-11-07

## 2022-10-23 RX ADMIN — HEPARIN SODIUM 5000 UNITS: 5000 INJECTION INTRAVENOUS; SUBCUTANEOUS at 05:08

## 2022-10-23 RX ADMIN — Medication 5 MG: at 01:03

## 2022-10-23 RX ADMIN — INSULIN LISPRO 6 UNITS: 100 INJECTION, SOLUTION INTRAVENOUS; SUBCUTANEOUS at 11:54

## 2022-10-23 RX ADMIN — INSULIN LISPRO 2 UNITS: 100 INJECTION, SOLUTION INTRAVENOUS; SUBCUTANEOUS at 09:14

## 2022-10-23 RX ADMIN — INSULIN LISPRO 6 UNITS: 100 INJECTION, SOLUTION INTRAVENOUS; SUBCUTANEOUS at 17:35

## 2022-10-23 RX ADMIN — Medication 10 ML: at 09:14

## 2022-10-23 RX ADMIN — LISINOPRIL 30 MG: 20 TABLET ORAL at 09:13

## 2022-10-23 RX ADMIN — OLANZAPINE 5 MG: 5 TABLET, FILM COATED ORAL at 09:15

## 2022-10-23 RX ADMIN — HEPARIN SODIUM 5000 UNITS: 5000 INJECTION INTRAVENOUS; SUBCUTANEOUS at 21:28

## 2022-10-23 RX ADMIN — INSULIN LISPRO 6 UNITS: 100 INJECTION, SOLUTION INTRAVENOUS; SUBCUTANEOUS at 09:14

## 2022-10-23 RX ADMIN — INSULIN LISPRO 2 UNITS: 100 INJECTION, SOLUTION INTRAVENOUS; SUBCUTANEOUS at 11:54

## 2022-10-23 RX ADMIN — OLANZAPINE 5 MG: 5 TABLET, FILM COATED ORAL at 21:28

## 2022-10-23 RX ADMIN — INSULIN DETEMIR 8 UNITS: 100 INJECTION, SOLUTION SUBCUTANEOUS at 21:29

## 2022-10-23 RX ADMIN — INSULIN LISPRO 3 UNITS: 100 INJECTION, SOLUTION INTRAVENOUS; SUBCUTANEOUS at 17:35

## 2022-10-23 RX ADMIN — VANCOMYCIN HYDROCHLORIDE 1000 MG: 1 INJECTION, SOLUTION INTRAVENOUS at 12:34

## 2022-10-23 RX ADMIN — FERROUS SULFATE TAB 325 MG (65 MG ELEMENTAL FE) 325 MG: 325 (65 FE) TAB at 09:13

## 2022-10-23 RX ADMIN — SODIUM CHLORIDE 2 G: 900 INJECTION INTRAVENOUS at 11:43

## 2022-10-23 RX ADMIN — AMLODIPINE BESYLATE 2.5 MG: 2.5 TABLET ORAL at 09:14

## 2022-10-23 RX ADMIN — HEPARIN SODIUM 5000 UNITS: 5000 INJECTION INTRAVENOUS; SUBCUTANEOUS at 14:11

## 2022-10-24 LAB
ANION GAP SERPL CALCULATED.3IONS-SCNC: 7 MMOL/L (ref 5–15)
BUN SERPL-MCNC: 14 MG/DL (ref 8–23)
BUN/CREAT SERPL: 17.3 (ref 7–25)
CALCIUM SPEC-SCNC: 9.2 MG/DL (ref 8.6–10.5)
CHLORIDE SERPL-SCNC: 101 MMOL/L (ref 98–107)
CO2 SERPL-SCNC: 30 MMOL/L (ref 22–29)
CREAT SERPL-MCNC: 0.81 MG/DL (ref 0.76–1.27)
EGFRCR SERPLBLD CKD-EPI 2021: 97.8 ML/MIN/1.73
FUNGUS (MYCOLOGY) CULTURE: NORMAL
FUNGUS STAIN: NORMAL
GLUCOSE BLDC GLUCOMTR-MCNC: 170 MG/DL (ref 70–130)
GLUCOSE BLDC GLUCOMTR-MCNC: 177 MG/DL (ref 70–130)
GLUCOSE BLDC GLUCOMTR-MCNC: 227 MG/DL (ref 70–130)
GLUCOSE BLDC GLUCOMTR-MCNC: 257 MG/DL (ref 70–130)
GLUCOSE SERPL-MCNC: 227 MG/DL (ref 65–99)
POTASSIUM SERPL-SCNC: 4.8 MMOL/L (ref 3.5–5.2)
SODIUM SERPL-SCNC: 138 MMOL/L (ref 136–145)
VANCOMYCIN SERPL-MCNC: 15.8 MCG/ML (ref 5–40)

## 2022-10-24 PROCEDURE — 63710000001 INSULIN DETEMIR PER 5 UNITS: Performed by: INTERNAL MEDICINE

## 2022-10-24 PROCEDURE — 63710000001 INSULIN LISPRO (HUMAN) PER 5 UNITS: Performed by: INTERNAL MEDICINE

## 2022-10-24 PROCEDURE — 99024 POSTOP FOLLOW-UP VISIT: CPT | Performed by: ORTHOPAEDIC SURGERY

## 2022-10-24 PROCEDURE — 80202 ASSAY OF VANCOMYCIN: CPT

## 2022-10-24 PROCEDURE — 25010000002 HEPARIN (PORCINE) PER 1000 UNITS: Performed by: ORTHOPAEDIC SURGERY

## 2022-10-24 PROCEDURE — 97535 SELF CARE MNGMENT TRAINING: CPT

## 2022-10-24 PROCEDURE — 97530 THERAPEUTIC ACTIVITIES: CPT

## 2022-10-24 PROCEDURE — 99232 SBSQ HOSP IP/OBS MODERATE 35: CPT | Performed by: INTERNAL MEDICINE

## 2022-10-24 PROCEDURE — 25010000002 ZIPRASIDONE MESYLATE PER 10 MG: Performed by: ORTHOPAEDIC SURGERY

## 2022-10-24 PROCEDURE — 25010000002 CEFTRIAXONE PER 250 MG: Performed by: INTERNAL MEDICINE

## 2022-10-24 PROCEDURE — 25010000002 VANCOMYCIN PER 500 MG

## 2022-10-24 PROCEDURE — 80048 BASIC METABOLIC PNL TOTAL CA: CPT

## 2022-10-24 PROCEDURE — 25010000002 HYDROMORPHONE PER 4 MG: Performed by: ORTHOPAEDIC SURGERY

## 2022-10-24 PROCEDURE — 63710000001 INSULIN LISPRO (HUMAN) PER 5 UNITS: Performed by: ORTHOPAEDIC SURGERY

## 2022-10-24 PROCEDURE — 82962 GLUCOSE BLOOD TEST: CPT

## 2022-10-24 RX ORDER — INSULIN LISPRO 100 [IU]/ML
8 INJECTION, SOLUTION INTRAVENOUS; SUBCUTANEOUS
Status: DISCONTINUED | OUTPATIENT
Start: 2022-10-24 | End: 2022-11-05

## 2022-10-24 RX ADMIN — VANCOMYCIN HYDROCHLORIDE 1000 MG: 1 INJECTION, SOLUTION INTRAVENOUS at 14:41

## 2022-10-24 RX ADMIN — ZIPRASIDONE MESYLATE 20 MG: 20 INJECTION, POWDER, LYOPHILIZED, FOR SOLUTION INTRAMUSCULAR at 23:02

## 2022-10-24 RX ADMIN — SODIUM CHLORIDE 2 G: 900 INJECTION INTRAVENOUS at 14:42

## 2022-10-24 RX ADMIN — OXYCODONE 5 MG: 5 TABLET ORAL at 11:18

## 2022-10-24 RX ADMIN — FERROUS SULFATE TAB 325 MG (65 MG ELEMENTAL FE) 325 MG: 325 (65 FE) TAB at 09:05

## 2022-10-24 RX ADMIN — INSULIN LISPRO 2 UNITS: 100 INJECTION, SOLUTION INTRAVENOUS; SUBCUTANEOUS at 09:05

## 2022-10-24 RX ADMIN — LISINOPRIL 30 MG: 20 TABLET ORAL at 09:05

## 2022-10-24 RX ADMIN — INSULIN LISPRO 4 UNITS: 100 INJECTION, SOLUTION INTRAVENOUS; SUBCUTANEOUS at 16:26

## 2022-10-24 RX ADMIN — OLANZAPINE 5 MG: 5 TABLET, FILM COATED ORAL at 20:48

## 2022-10-24 RX ADMIN — HYDROMORPHONE HYDROCHLORIDE 0.5 MG: 2 INJECTION, SOLUTION INTRAMUSCULAR; INTRAVENOUS; SUBCUTANEOUS at 14:49

## 2022-10-24 RX ADMIN — INSULIN LISPRO 6 UNITS: 100 INJECTION, SOLUTION INTRAVENOUS; SUBCUTANEOUS at 12:10

## 2022-10-24 RX ADMIN — SENNOSIDES AND DOCUSATE SODIUM 2 TABLET: 50; 8.6 TABLET ORAL at 20:48

## 2022-10-24 RX ADMIN — HEPARIN SODIUM 5000 UNITS: 5000 INJECTION INTRAVENOUS; SUBCUTANEOUS at 20:48

## 2022-10-24 RX ADMIN — VANCOMYCIN HYDROCHLORIDE 1000 MG: 1 INJECTION, SOLUTION INTRAVENOUS at 00:45

## 2022-10-24 RX ADMIN — HEPARIN SODIUM 5000 UNITS: 5000 INJECTION INTRAVENOUS; SUBCUTANEOUS at 06:26

## 2022-10-24 RX ADMIN — ZIPRASIDONE MESYLATE 20 MG: 20 INJECTION, POWDER, LYOPHILIZED, FOR SOLUTION INTRAMUSCULAR at 11:33

## 2022-10-24 RX ADMIN — INSULIN LISPRO 6 UNITS: 100 INJECTION, SOLUTION INTRAVENOUS; SUBCUTANEOUS at 09:05

## 2022-10-24 RX ADMIN — OLANZAPINE 5 MG: 5 TABLET, FILM COATED ORAL at 09:05

## 2022-10-24 RX ADMIN — INSULIN LISPRO 8 UNITS: 100 INJECTION, SOLUTION INTRAVENOUS; SUBCUTANEOUS at 16:29

## 2022-10-24 RX ADMIN — INSULIN DETEMIR 10 UNITS: 100 INJECTION, SOLUTION SUBCUTANEOUS at 20:51

## 2022-10-24 RX ADMIN — AMLODIPINE BESYLATE 10 MG: 10 TABLET ORAL at 09:05

## 2022-10-24 RX ADMIN — INSULIN LISPRO 3 UNITS: 100 INJECTION, SOLUTION INTRAVENOUS; SUBCUTANEOUS at 12:10

## 2022-10-24 RX ADMIN — HEPARIN SODIUM 5000 UNITS: 5000 INJECTION INTRAVENOUS; SUBCUTANEOUS at 13:02

## 2022-10-25 LAB
AFB CULTURE (MYCOBACTERIA): NORMAL
AFB SMEAR: NORMAL
ANION GAP SERPL CALCULATED.3IONS-SCNC: 9 MMOL/L (ref 5–15)
BUN SERPL-MCNC: 17 MG/DL (ref 8–23)
BUN/CREAT SERPL: 18.9 (ref 7–25)
CALCIUM SPEC-SCNC: 9.1 MG/DL (ref 8.6–10.5)
CHLORIDE SERPL-SCNC: 102 MMOL/L (ref 98–107)
CO2 SERPL-SCNC: 28 MMOL/L (ref 22–29)
CREAT SERPL-MCNC: 0.9 MG/DL (ref 0.76–1.27)
EGFRCR SERPLBLD CKD-EPI 2021: 94.8 ML/MIN/1.73
GLUCOSE BLDC GLUCOMTR-MCNC: 216 MG/DL (ref 70–130)
GLUCOSE BLDC GLUCOMTR-MCNC: 223 MG/DL (ref 70–130)
GLUCOSE BLDC GLUCOMTR-MCNC: 225 MG/DL (ref 70–130)
GLUCOSE SERPL-MCNC: 219 MG/DL (ref 65–99)
POTASSIUM SERPL-SCNC: 4.5 MMOL/L (ref 3.5–5.2)
SODIUM SERPL-SCNC: 139 MMOL/L (ref 136–145)

## 2022-10-25 PROCEDURE — 82962 GLUCOSE BLOOD TEST: CPT

## 2022-10-25 PROCEDURE — 63710000001 INSULIN DETEMIR PER 5 UNITS: Performed by: INTERNAL MEDICINE

## 2022-10-25 PROCEDURE — 63710000001 INSULIN LISPRO (HUMAN) PER 5 UNITS: Performed by: INTERNAL MEDICINE

## 2022-10-25 PROCEDURE — 25010000002 VANCOMYCIN PER 500 MG

## 2022-10-25 PROCEDURE — 80048 BASIC METABOLIC PNL TOTAL CA: CPT | Performed by: INTERNAL MEDICINE

## 2022-10-25 PROCEDURE — 25010000002 HEPARIN (PORCINE) PER 1000 UNITS: Performed by: ORTHOPAEDIC SURGERY

## 2022-10-25 PROCEDURE — 25010000002 CEFTRIAXONE PER 250 MG: Performed by: INTERNAL MEDICINE

## 2022-10-25 PROCEDURE — 63710000001 INSULIN LISPRO (HUMAN) PER 5 UNITS: Performed by: ORTHOPAEDIC SURGERY

## 2022-10-25 PROCEDURE — 99232 SBSQ HOSP IP/OBS MODERATE 35: CPT | Performed by: INTERNAL MEDICINE

## 2022-10-25 PROCEDURE — 97530 THERAPEUTIC ACTIVITIES: CPT

## 2022-10-25 PROCEDURE — 25010000002 ZIPRASIDONE MESYLATE PER 10 MG: Performed by: ORTHOPAEDIC SURGERY

## 2022-10-25 RX ORDER — LISINOPRIL 40 MG/1
40 TABLET ORAL DAILY
Status: DISCONTINUED | OUTPATIENT
Start: 2022-10-26 | End: 2022-11-16 | Stop reason: HOSPADM

## 2022-10-25 RX ADMIN — SENNOSIDES AND DOCUSATE SODIUM 2 TABLET: 50; 8.6 TABLET ORAL at 20:23

## 2022-10-25 RX ADMIN — Medication 5 MG: at 22:01

## 2022-10-25 RX ADMIN — AMLODIPINE BESYLATE 10 MG: 10 TABLET ORAL at 08:03

## 2022-10-25 RX ADMIN — ZIPRASIDONE MESYLATE 20 MG: 20 INJECTION, POWDER, LYOPHILIZED, FOR SOLUTION INTRAMUSCULAR at 14:04

## 2022-10-25 RX ADMIN — OLANZAPINE 5 MG: 5 TABLET, FILM COATED ORAL at 20:23

## 2022-10-25 RX ADMIN — Medication 10 ML: at 20:23

## 2022-10-25 RX ADMIN — HEPARIN SODIUM 5000 UNITS: 5000 INJECTION INTRAVENOUS; SUBCUTANEOUS at 20:23

## 2022-10-25 RX ADMIN — Medication 10 ML: at 08:07

## 2022-10-25 RX ADMIN — HEPARIN SODIUM 5000 UNITS: 5000 INJECTION INTRAVENOUS; SUBCUTANEOUS at 06:31

## 2022-10-25 RX ADMIN — INSULIN LISPRO 8 UNITS: 100 INJECTION, SOLUTION INTRAVENOUS; SUBCUTANEOUS at 16:47

## 2022-10-25 RX ADMIN — VANCOMYCIN HYDROCHLORIDE 1000 MG: 1 INJECTION, SOLUTION INTRAVENOUS at 12:08

## 2022-10-25 RX ADMIN — INSULIN DETEMIR 10 UNITS: 100 INJECTION, SOLUTION SUBCUTANEOUS at 20:29

## 2022-10-25 RX ADMIN — SODIUM CHLORIDE 2 G: 900 INJECTION INTRAVENOUS at 11:30

## 2022-10-25 RX ADMIN — OLANZAPINE 5 MG: 5 TABLET, FILM COATED ORAL at 08:04

## 2022-10-25 RX ADMIN — INSULIN LISPRO 3 UNITS: 100 INJECTION, SOLUTION INTRAVENOUS; SUBCUTANEOUS at 08:04

## 2022-10-25 RX ADMIN — INSULIN LISPRO 3 UNITS: 100 INJECTION, SOLUTION INTRAVENOUS; SUBCUTANEOUS at 16:45

## 2022-10-25 RX ADMIN — INSULIN LISPRO 3 UNITS: 100 INJECTION, SOLUTION INTRAVENOUS; SUBCUTANEOUS at 11:30

## 2022-10-25 RX ADMIN — INSULIN LISPRO 8 UNITS: 100 INJECTION, SOLUTION INTRAVENOUS; SUBCUTANEOUS at 11:30

## 2022-10-25 RX ADMIN — VANCOMYCIN HYDROCHLORIDE 1000 MG: 1 INJECTION, SOLUTION INTRAVENOUS at 00:06

## 2022-10-25 RX ADMIN — LISINOPRIL 30 MG: 20 TABLET ORAL at 08:03

## 2022-10-25 RX ADMIN — FERROUS SULFATE TAB 325 MG (65 MG ELEMENTAL FE) 325 MG: 325 (65 FE) TAB at 08:03

## 2022-10-25 RX ADMIN — INSULIN LISPRO 8 UNITS: 100 INJECTION, SOLUTION INTRAVENOUS; SUBCUTANEOUS at 08:04

## 2022-10-25 RX ADMIN — HEPARIN SODIUM 5000 UNITS: 5000 INJECTION INTRAVENOUS; SUBCUTANEOUS at 13:19

## 2022-10-26 LAB
ALBUMIN SERPL-MCNC: 2.9 G/DL (ref 3.5–5.2)
ALBUMIN/GLOB SERPL: 0.8 G/DL
ALP SERPL-CCNC: 132 U/L (ref 39–117)
ALT SERPL W P-5'-P-CCNC: 22 U/L (ref 1–41)
ANION GAP SERPL CALCULATED.3IONS-SCNC: 8 MMOL/L (ref 5–15)
AST SERPL-CCNC: 16 U/L (ref 1–40)
BASOPHILS # BLD AUTO: 0.03 10*3/MM3 (ref 0–0.2)
BASOPHILS NFR BLD AUTO: 0.4 % (ref 0–1.5)
BILIRUB SERPL-MCNC: <0.2 MG/DL (ref 0–1.2)
BUN SERPL-MCNC: 18 MG/DL (ref 8–23)
BUN/CREAT SERPL: 20.7 (ref 7–25)
CALCIUM SPEC-SCNC: 8.6 MG/DL (ref 8.6–10.5)
CHLORIDE SERPL-SCNC: 101 MMOL/L (ref 98–107)
CO2 SERPL-SCNC: 28 MMOL/L (ref 22–29)
CREAT SERPL-MCNC: 0.87 MG/DL (ref 0.76–1.27)
CRP SERPL-MCNC: 3.1 MG/DL (ref 0–0.5)
DEPRECATED RDW RBC AUTO: 47.9 FL (ref 37–54)
EGFRCR SERPLBLD CKD-EPI 2021: 95.8 ML/MIN/1.73
EOSINOPHIL # BLD AUTO: 0.33 10*3/MM3 (ref 0–0.4)
EOSINOPHIL NFR BLD AUTO: 4.8 % (ref 0.3–6.2)
ERYTHROCYTE [DISTWIDTH] IN BLOOD BY AUTOMATED COUNT: 14.3 % (ref 12.3–15.4)
GLOBULIN UR ELPH-MCNC: 3.6 GM/DL
GLUCOSE BLDC GLUCOMTR-MCNC: 109 MG/DL (ref 70–130)
GLUCOSE BLDC GLUCOMTR-MCNC: 267 MG/DL (ref 70–130)
GLUCOSE BLDC GLUCOMTR-MCNC: 268 MG/DL (ref 70–130)
GLUCOSE SERPL-MCNC: 323 MG/DL (ref 65–99)
HCT VFR BLD AUTO: 27.4 % (ref 37.5–51)
HGB BLD-MCNC: 8.3 G/DL (ref 13–17.7)
IMM GRANULOCYTES # BLD AUTO: 0.02 10*3/MM3 (ref 0–0.05)
IMM GRANULOCYTES NFR BLD AUTO: 0.3 % (ref 0–0.5)
LYMPHOCYTES # BLD AUTO: 1.54 10*3/MM3 (ref 0.7–3.1)
LYMPHOCYTES NFR BLD AUTO: 22.4 % (ref 19.6–45.3)
MCH RBC QN AUTO: 27.9 PG (ref 26.6–33)
MCHC RBC AUTO-ENTMCNC: 30.3 G/DL (ref 31.5–35.7)
MCV RBC AUTO: 92.3 FL (ref 79–97)
MONOCYTES # BLD AUTO: 0.69 10*3/MM3 (ref 0.1–0.9)
MONOCYTES NFR BLD AUTO: 10 % (ref 5–12)
NEUTROPHILS NFR BLD AUTO: 4.26 10*3/MM3 (ref 1.7–7)
NEUTROPHILS NFR BLD AUTO: 62.1 % (ref 42.7–76)
NRBC BLD AUTO-RTO: 0 /100 WBC (ref 0–0.2)
PLATELET # BLD AUTO: 284 10*3/MM3 (ref 140–450)
PMV BLD AUTO: 10.9 FL (ref 6–12)
POTASSIUM SERPL-SCNC: 4.5 MMOL/L (ref 3.5–5.2)
PROT SERPL-MCNC: 6.5 G/DL (ref 6–8.5)
RBC # BLD AUTO: 2.97 10*6/MM3 (ref 4.14–5.8)
SODIUM SERPL-SCNC: 137 MMOL/L (ref 136–145)
WBC NRBC COR # BLD: 6.87 10*3/MM3 (ref 3.4–10.8)

## 2022-10-26 PROCEDURE — 97110 THERAPEUTIC EXERCISES: CPT

## 2022-10-26 PROCEDURE — 63710000001 INSULIN LISPRO (HUMAN) PER 5 UNITS: Performed by: INTERNAL MEDICINE

## 2022-10-26 PROCEDURE — 86140 C-REACTIVE PROTEIN: CPT | Performed by: INTERNAL MEDICINE

## 2022-10-26 PROCEDURE — 85025 COMPLETE CBC W/AUTO DIFF WBC: CPT | Performed by: INTERNAL MEDICINE

## 2022-10-26 PROCEDURE — 99232 SBSQ HOSP IP/OBS MODERATE 35: CPT | Performed by: INTERNAL MEDICINE

## 2022-10-26 PROCEDURE — 63710000001 INSULIN LISPRO (HUMAN) PER 5 UNITS: Performed by: ORTHOPAEDIC SURGERY

## 2022-10-26 PROCEDURE — 80053 COMPREHEN METABOLIC PANEL: CPT | Performed by: INTERNAL MEDICINE

## 2022-10-26 PROCEDURE — 25010000002 CEFTRIAXONE PER 250 MG: Performed by: INTERNAL MEDICINE

## 2022-10-26 PROCEDURE — 63710000001 INSULIN DETEMIR PER 5 UNITS: Performed by: INTERNAL MEDICINE

## 2022-10-26 PROCEDURE — 82962 GLUCOSE BLOOD TEST: CPT

## 2022-10-26 PROCEDURE — 25010000002 HEPARIN (PORCINE) PER 1000 UNITS: Performed by: ORTHOPAEDIC SURGERY

## 2022-10-26 PROCEDURE — 25010000002 VANCOMYCIN PER 500 MG

## 2022-10-26 RX ADMIN — OXYCODONE 5 MG: 5 TABLET ORAL at 16:23

## 2022-10-26 RX ADMIN — HEPARIN SODIUM 5000 UNITS: 5000 INJECTION INTRAVENOUS; SUBCUTANEOUS at 20:57

## 2022-10-26 RX ADMIN — INSULIN LISPRO 8 UNITS: 100 INJECTION, SOLUTION INTRAVENOUS; SUBCUTANEOUS at 09:17

## 2022-10-26 RX ADMIN — AMLODIPINE BESYLATE 10 MG: 10 TABLET ORAL at 09:16

## 2022-10-26 RX ADMIN — VANCOMYCIN HYDROCHLORIDE 1000 MG: 1 INJECTION, SOLUTION INTRAVENOUS at 00:12

## 2022-10-26 RX ADMIN — INSULIN LISPRO 8 UNITS: 100 INJECTION, SOLUTION INTRAVENOUS; SUBCUTANEOUS at 17:55

## 2022-10-26 RX ADMIN — Medication 5 MG: at 20:57

## 2022-10-26 RX ADMIN — HEPARIN SODIUM 5000 UNITS: 5000 INJECTION INTRAVENOUS; SUBCUTANEOUS at 05:17

## 2022-10-26 RX ADMIN — INSULIN LISPRO 4 UNITS: 100 INJECTION, SOLUTION INTRAVENOUS; SUBCUTANEOUS at 09:17

## 2022-10-26 RX ADMIN — OXYCODONE 5 MG: 5 TABLET ORAL at 03:11

## 2022-10-26 RX ADMIN — INSULIN DETEMIR 10 UNITS: 100 INJECTION, SOLUTION SUBCUTANEOUS at 20:59

## 2022-10-26 RX ADMIN — OLANZAPINE 5 MG: 5 TABLET, FILM COATED ORAL at 20:57

## 2022-10-26 RX ADMIN — SODIUM CHLORIDE 2 G: 900 INJECTION INTRAVENOUS at 12:57

## 2022-10-26 RX ADMIN — Medication 10 ML: at 09:24

## 2022-10-26 RX ADMIN — FERROUS SULFATE TAB 325 MG (65 MG ELEMENTAL FE) 325 MG: 325 (65 FE) TAB at 09:16

## 2022-10-26 RX ADMIN — VANCOMYCIN HYDROCHLORIDE 1000 MG: 1 INJECTION, SOLUTION INTRAVENOUS at 14:05

## 2022-10-26 RX ADMIN — OLANZAPINE 5 MG: 5 TABLET, FILM COATED ORAL at 09:16

## 2022-10-26 RX ADMIN — HEPARIN SODIUM 5000 UNITS: 5000 INJECTION INTRAVENOUS; SUBCUTANEOUS at 16:23

## 2022-10-26 RX ADMIN — Medication 10 ML: at 21:01

## 2022-10-26 RX ADMIN — INSULIN LISPRO 4 UNITS: 100 INJECTION, SOLUTION INTRAVENOUS; SUBCUTANEOUS at 17:55

## 2022-10-26 RX ADMIN — LISINOPRIL 40 MG: 40 TABLET ORAL at 09:16

## 2022-10-27 LAB
GLUCOSE BLDC GLUCOMTR-MCNC: 132 MG/DL (ref 70–130)
GLUCOSE BLDC GLUCOMTR-MCNC: 350 MG/DL (ref 70–130)
SARS-COV-2 RDRP RESP QL NAA+PROBE: NORMAL
VANCOMYCIN SERPL-MCNC: 18.7 MCG/ML (ref 5–40)

## 2022-10-27 PROCEDURE — 87635 SARS-COV-2 COVID-19 AMP PRB: CPT | Performed by: INTERNAL MEDICINE

## 2022-10-27 PROCEDURE — 25010000002 ZIPRASIDONE MESYLATE PER 10 MG: Performed by: ORTHOPAEDIC SURGERY

## 2022-10-27 PROCEDURE — 82962 GLUCOSE BLOOD TEST: CPT

## 2022-10-27 PROCEDURE — 99239 HOSP IP/OBS DSCHRG MGMT >30: CPT | Performed by: INTERNAL MEDICINE

## 2022-10-27 PROCEDURE — C1894 INTRO/SHEATH, NON-LASER: HCPCS

## 2022-10-27 PROCEDURE — 02HV33Z INSERTION OF INFUSION DEVICE INTO SUPERIOR VENA CAVA, PERCUTANEOUS APPROACH: ICD-10-PCS | Performed by: INTERNAL MEDICINE

## 2022-10-27 PROCEDURE — 25010000002 CEFTRIAXONE PER 250 MG: Performed by: INTERNAL MEDICINE

## 2022-10-27 PROCEDURE — 63710000001 INSULIN LISPRO (HUMAN) PER 5 UNITS: Performed by: ORTHOPAEDIC SURGERY

## 2022-10-27 PROCEDURE — 63710000001 INSULIN LISPRO (HUMAN) PER 5 UNITS: Performed by: INTERNAL MEDICINE

## 2022-10-27 PROCEDURE — C1751 CATH, INF, PER/CENT/MIDLINE: HCPCS

## 2022-10-27 PROCEDURE — 25010000002 VANCOMYCIN PER 500 MG

## 2022-10-27 PROCEDURE — 63710000001 INSULIN DETEMIR PER 5 UNITS: Performed by: INTERNAL MEDICINE

## 2022-10-27 PROCEDURE — 25010000002 HEPARIN (PORCINE) PER 1000 UNITS: Performed by: ORTHOPAEDIC SURGERY

## 2022-10-27 PROCEDURE — 80202 ASSAY OF VANCOMYCIN: CPT

## 2022-10-27 RX ORDER — SODIUM CHLORIDE 0.9 % (FLUSH) 0.9 %
10 SYRINGE (ML) INJECTION EVERY 12 HOURS SCHEDULED
Status: DISCONTINUED | OUTPATIENT
Start: 2022-10-27 | End: 2022-11-16 | Stop reason: HOSPADM

## 2022-10-27 RX ORDER — VANCOMYCIN HYDROCHLORIDE 1 G/200ML
1000 INJECTION, SOLUTION INTRAVENOUS EVERY 12 HOURS
Qty: 9200 ML | Refills: 0
Start: 2022-10-27 | End: 2022-11-16 | Stop reason: HOSPADM

## 2022-10-27 RX ORDER — HEPARIN SODIUM 5000 [USP'U]/ML
5000 INJECTION, SOLUTION INTRAVENOUS; SUBCUTANEOUS EVERY 8 HOURS SCHEDULED
Start: 2022-10-27 | End: 2022-11-16 | Stop reason: HOSPADM

## 2022-10-27 RX ORDER — HALOPERIDOL 5 MG/ML
5 INJECTION INTRAMUSCULAR ONCE
Status: DISCONTINUED | OUTPATIENT
Start: 2022-10-27 | End: 2022-11-01

## 2022-10-27 RX ORDER — ACETAMINOPHEN 325 MG/1
650 TABLET ORAL EVERY 4 HOURS PRN
Status: ON HOLD
Start: 2022-10-27 | End: 2022-12-06 | Stop reason: SDUPTHER

## 2022-10-27 RX ORDER — INSULIN LISPRO 100 [IU]/ML
0-7 INJECTION, SOLUTION INTRAVENOUS; SUBCUTANEOUS
Refills: 12
Start: 2022-10-27 | End: 2022-11-16 | Stop reason: HOSPADM

## 2022-10-27 RX ORDER — ACETAMINOPHEN 325 MG/1
650 TABLET ORAL EVERY 6 HOURS PRN
Qty: 40 TABLET | Refills: 0 | Status: SHIPPED | OUTPATIENT
Start: 2022-10-27 | End: 2022-12-19

## 2022-10-27 RX ORDER — INSULIN LISPRO 100 [IU]/ML
8 INJECTION, SOLUTION INTRAVENOUS; SUBCUTANEOUS
Refills: 12
Start: 2022-10-27 | End: 2022-11-16 | Stop reason: HOSPADM

## 2022-10-27 RX ORDER — LISINOPRIL 40 MG/1
40 TABLET ORAL DAILY
Start: 2022-10-27

## 2022-10-27 RX ORDER — AMLODIPINE BESYLATE 10 MG/1
10 TABLET ORAL
Start: 2022-10-27 | End: 2022-11-16 | Stop reason: HOSPADM

## 2022-10-27 RX ORDER — SODIUM CHLORIDE 0.9 % (FLUSH) 0.9 %
10 SYRINGE (ML) INJECTION AS NEEDED
Status: DISCONTINUED | OUTPATIENT
Start: 2022-10-27 | End: 2022-11-03 | Stop reason: SDUPTHER

## 2022-10-27 RX ORDER — LORAZEPAM 0.5 MG/1
0.5 TABLET ORAL ONCE
Status: COMPLETED | OUTPATIENT
Start: 2022-10-27 | End: 2022-10-27

## 2022-10-27 RX ORDER — OXYCODONE HYDROCHLORIDE 5 MG/1
5 TABLET ORAL EVERY 4 HOURS PRN
Qty: 12 TABLET | Refills: 0 | Status: SHIPPED | OUTPATIENT
Start: 2022-10-27 | End: 2022-11-16 | Stop reason: HOSPADM

## 2022-10-27 RX ADMIN — OLANZAPINE 5 MG: 5 TABLET, FILM COATED ORAL at 08:48

## 2022-10-27 RX ADMIN — HEPARIN SODIUM 5000 UNITS: 5000 INJECTION INTRAVENOUS; SUBCUTANEOUS at 05:18

## 2022-10-27 RX ADMIN — SODIUM CHLORIDE 2 G: 900 INJECTION INTRAVENOUS at 12:30

## 2022-10-27 RX ADMIN — ZIPRASIDONE MESYLATE 20 MG: 20 INJECTION, POWDER, LYOPHILIZED, FOR SOLUTION INTRAMUSCULAR at 00:48

## 2022-10-27 RX ADMIN — AMLODIPINE BESYLATE 10 MG: 10 TABLET ORAL at 08:48

## 2022-10-27 RX ADMIN — OLANZAPINE 5 MG: 5 TABLET, FILM COATED ORAL at 21:52

## 2022-10-27 RX ADMIN — VANCOMYCIN HYDROCHLORIDE 1000 MG: 1 INJECTION, SOLUTION INTRAVENOUS at 14:48

## 2022-10-27 RX ADMIN — LISINOPRIL 40 MG: 40 TABLET ORAL at 08:48

## 2022-10-27 RX ADMIN — LORAZEPAM 0.5 MG: 0.5 TABLET ORAL at 20:16

## 2022-10-27 RX ADMIN — FERROUS SULFATE TAB 325 MG (65 MG ELEMENTAL FE) 325 MG: 325 (65 FE) TAB at 08:48

## 2022-10-27 RX ADMIN — INSULIN LISPRO 8 UNITS: 100 INJECTION, SOLUTION INTRAVENOUS; SUBCUTANEOUS at 17:26

## 2022-10-27 RX ADMIN — ACETAMINOPHEN 650 MG: 325 TABLET, FILM COATED ORAL at 21:57

## 2022-10-27 RX ADMIN — VANCOMYCIN HYDROCHLORIDE 1000 MG: 1 INJECTION, SOLUTION INTRAVENOUS at 01:11

## 2022-10-27 RX ADMIN — INSULIN LISPRO 6 UNITS: 100 INJECTION, SOLUTION INTRAVENOUS; SUBCUTANEOUS at 17:26

## 2022-10-27 RX ADMIN — INSULIN DETEMIR 10 UNITS: 100 INJECTION, SOLUTION SUBCUTANEOUS at 21:52

## 2022-10-27 RX ADMIN — HEPARIN SODIUM 5000 UNITS: 5000 INJECTION INTRAVENOUS; SUBCUTANEOUS at 21:53

## 2022-10-28 LAB
GLUCOSE BLDC GLUCOMTR-MCNC: 167 MG/DL (ref 70–130)
GLUCOSE BLDC GLUCOMTR-MCNC: 178 MG/DL (ref 70–130)
GLUCOSE BLDC GLUCOMTR-MCNC: 203 MG/DL (ref 70–130)
GLUCOSE BLDC GLUCOMTR-MCNC: 396 MG/DL (ref 70–130)

## 2022-10-28 PROCEDURE — 25010000002 HEPARIN (PORCINE) PER 1000 UNITS: Performed by: ORTHOPAEDIC SURGERY

## 2022-10-28 PROCEDURE — 25010000002 VANCOMYCIN PER 500 MG

## 2022-10-28 PROCEDURE — 63710000001 INSULIN LISPRO (HUMAN) PER 5 UNITS: Performed by: INTERNAL MEDICINE

## 2022-10-28 PROCEDURE — 99232 SBSQ HOSP IP/OBS MODERATE 35: CPT | Performed by: INTERNAL MEDICINE

## 2022-10-28 PROCEDURE — 25010000002 CEFTRIAXONE PER 250 MG: Performed by: INTERNAL MEDICINE

## 2022-10-28 PROCEDURE — 63710000001 INSULIN LISPRO (HUMAN) PER 5 UNITS: Performed by: ORTHOPAEDIC SURGERY

## 2022-10-28 PROCEDURE — 82962 GLUCOSE BLOOD TEST: CPT

## 2022-10-28 PROCEDURE — 63710000001 INSULIN DETEMIR PER 5 UNITS: Performed by: INTERNAL MEDICINE

## 2022-10-28 RX ORDER — OLANZAPINE 5 MG/1
10 TABLET ORAL NIGHTLY
Status: DISCONTINUED | OUTPATIENT
Start: 2022-10-28 | End: 2022-11-16 | Stop reason: HOSPADM

## 2022-10-28 RX ORDER — OLANZAPINE 5 MG/1
5 TABLET ORAL DAILY
Status: DISCONTINUED | OUTPATIENT
Start: 2022-10-29 | End: 2022-11-16 | Stop reason: HOSPADM

## 2022-10-28 RX ORDER — LORAZEPAM 0.5 MG/1
0.25 TABLET ORAL EVERY 6 HOURS PRN
Status: DISCONTINUED | OUTPATIENT
Start: 2022-10-28 | End: 2022-11-02

## 2022-10-28 RX ADMIN — INSULIN LISPRO 8 UNITS: 100 INJECTION, SOLUTION INTRAVENOUS; SUBCUTANEOUS at 08:59

## 2022-10-28 RX ADMIN — INSULIN LISPRO 3 UNITS: 100 INJECTION, SOLUTION INTRAVENOUS; SUBCUTANEOUS at 11:54

## 2022-10-28 RX ADMIN — VANCOMYCIN HYDROCHLORIDE 1000 MG: 1 INJECTION, SOLUTION INTRAVENOUS at 11:53

## 2022-10-28 RX ADMIN — INSULIN DETEMIR 10 UNITS: 100 INJECTION, SOLUTION SUBCUTANEOUS at 21:48

## 2022-10-28 RX ADMIN — VANCOMYCIN HYDROCHLORIDE 1000 MG: 1 INJECTION, SOLUTION INTRAVENOUS at 01:03

## 2022-10-28 RX ADMIN — LISINOPRIL 40 MG: 40 TABLET ORAL at 09:22

## 2022-10-28 RX ADMIN — Medication 10 ML: at 09:27

## 2022-10-28 RX ADMIN — ACETAMINOPHEN 650 MG: 325 TABLET, FILM COATED ORAL at 20:12

## 2022-10-28 RX ADMIN — INSULIN LISPRO 2 UNITS: 100 INJECTION, SOLUTION INTRAVENOUS; SUBCUTANEOUS at 17:57

## 2022-10-28 RX ADMIN — INSULIN LISPRO 8 UNITS: 100 INJECTION, SOLUTION INTRAVENOUS; SUBCUTANEOUS at 17:55

## 2022-10-28 RX ADMIN — AMLODIPINE BESYLATE 10 MG: 10 TABLET ORAL at 09:22

## 2022-10-28 RX ADMIN — SODIUM CHLORIDE 2 G: 900 INJECTION INTRAVENOUS at 11:09

## 2022-10-28 RX ADMIN — OLANZAPINE 10 MG: 5 TABLET, FILM COATED ORAL at 20:12

## 2022-10-28 RX ADMIN — FERROUS SULFATE TAB 325 MG (65 MG ELEMENTAL FE) 325 MG: 325 (65 FE) TAB at 08:59

## 2022-10-28 RX ADMIN — INSULIN LISPRO 8 UNITS: 100 INJECTION, SOLUTION INTRAVENOUS; SUBCUTANEOUS at 11:54

## 2022-10-28 RX ADMIN — HEPARIN SODIUM 5000 UNITS: 5000 INJECTION INTRAVENOUS; SUBCUTANEOUS at 05:49

## 2022-10-28 RX ADMIN — OLANZAPINE 5 MG: 5 TABLET, FILM COATED ORAL at 09:21

## 2022-10-28 RX ADMIN — HEPARIN SODIUM 5000 UNITS: 5000 INJECTION INTRAVENOUS; SUBCUTANEOUS at 14:51

## 2022-10-28 RX ADMIN — HEPARIN SODIUM 5000 UNITS: 5000 INJECTION INTRAVENOUS; SUBCUTANEOUS at 20:11

## 2022-10-28 RX ADMIN — LORAZEPAM 0.25 MG: 0.5 TABLET ORAL at 20:12

## 2022-10-29 LAB
GLUCOSE BLDC GLUCOMTR-MCNC: 113 MG/DL (ref 70–130)
GLUCOSE BLDC GLUCOMTR-MCNC: 130 MG/DL (ref 70–130)
GLUCOSE BLDC GLUCOMTR-MCNC: 201 MG/DL (ref 70–130)
GLUCOSE BLDC GLUCOMTR-MCNC: 232 MG/DL (ref 70–130)

## 2022-10-29 PROCEDURE — 25010000002 CEFTRIAXONE PER 250 MG: Performed by: INTERNAL MEDICINE

## 2022-10-29 PROCEDURE — 63710000001 INSULIN LISPRO (HUMAN) PER 5 UNITS: Performed by: INTERNAL MEDICINE

## 2022-10-29 PROCEDURE — 25010000002 VANCOMYCIN PER 500 MG

## 2022-10-29 PROCEDURE — 82962 GLUCOSE BLOOD TEST: CPT

## 2022-10-29 PROCEDURE — 63710000001 INSULIN LISPRO (HUMAN) PER 5 UNITS: Performed by: ORTHOPAEDIC SURGERY

## 2022-10-29 PROCEDURE — 99232 SBSQ HOSP IP/OBS MODERATE 35: CPT | Performed by: NURSE PRACTITIONER

## 2022-10-29 PROCEDURE — 63710000001 INSULIN DETEMIR PER 5 UNITS: Performed by: INTERNAL MEDICINE

## 2022-10-29 PROCEDURE — 25010000002 HEPARIN (PORCINE) PER 1000 UNITS: Performed by: ORTHOPAEDIC SURGERY

## 2022-10-29 RX ADMIN — HEPARIN SODIUM 5000 UNITS: 5000 INJECTION INTRAVENOUS; SUBCUTANEOUS at 06:52

## 2022-10-29 RX ADMIN — FERROUS SULFATE TAB 325 MG (65 MG ELEMENTAL FE) 325 MG: 325 (65 FE) TAB at 09:36

## 2022-10-29 RX ADMIN — LORAZEPAM 0.25 MG: 0.5 TABLET ORAL at 19:27

## 2022-10-29 RX ADMIN — SENNOSIDES AND DOCUSATE SODIUM 2 TABLET: 50; 8.6 TABLET ORAL at 20:45

## 2022-10-29 RX ADMIN — OLANZAPINE 5 MG: 5 TABLET, FILM COATED ORAL at 09:38

## 2022-10-29 RX ADMIN — HEPARIN SODIUM 5000 UNITS: 5000 INJECTION INTRAVENOUS; SUBCUTANEOUS at 16:10

## 2022-10-29 RX ADMIN — VANCOMYCIN HYDROCHLORIDE 1000 MG: 1 INJECTION, SOLUTION INTRAVENOUS at 01:04

## 2022-10-29 RX ADMIN — SODIUM CHLORIDE 2 G: 900 INJECTION INTRAVENOUS at 11:16

## 2022-10-29 RX ADMIN — AMLODIPINE BESYLATE 10 MG: 10 TABLET ORAL at 09:36

## 2022-10-29 RX ADMIN — LISINOPRIL 40 MG: 40 TABLET ORAL at 09:36

## 2022-10-29 RX ADMIN — INSULIN DETEMIR 10 UNITS: 100 INJECTION, SOLUTION SUBCUTANEOUS at 20:47

## 2022-10-29 RX ADMIN — VANCOMYCIN HYDROCHLORIDE 1000 MG: 1 INJECTION, SOLUTION INTRAVENOUS at 12:10

## 2022-10-29 RX ADMIN — HEPARIN SODIUM 5000 UNITS: 5000 INJECTION INTRAVENOUS; SUBCUTANEOUS at 20:48

## 2022-10-29 RX ADMIN — INSULIN LISPRO 8 UNITS: 100 INJECTION, SOLUTION INTRAVENOUS; SUBCUTANEOUS at 09:36

## 2022-10-29 RX ADMIN — INSULIN LISPRO 3 UNITS: 100 INJECTION, SOLUTION INTRAVENOUS; SUBCUTANEOUS at 17:38

## 2022-10-29 RX ADMIN — OLANZAPINE 10 MG: 5 TABLET, FILM COATED ORAL at 20:44

## 2022-10-29 RX ADMIN — INSULIN LISPRO 8 UNITS: 100 INJECTION, SOLUTION INTRAVENOUS; SUBCUTANEOUS at 17:37

## 2022-10-30 LAB
GLUCOSE BLDC GLUCOMTR-MCNC: 103 MG/DL (ref 70–130)
GLUCOSE BLDC GLUCOMTR-MCNC: 148 MG/DL (ref 70–130)
GLUCOSE BLDC GLUCOMTR-MCNC: 196 MG/DL (ref 70–130)

## 2022-10-30 PROCEDURE — 63710000001 INSULIN LISPRO (HUMAN) PER 5 UNITS: Performed by: ORTHOPAEDIC SURGERY

## 2022-10-30 PROCEDURE — 25010000002 HEPARIN (PORCINE) PER 1000 UNITS: Performed by: ORTHOPAEDIC SURGERY

## 2022-10-30 PROCEDURE — 82962 GLUCOSE BLOOD TEST: CPT

## 2022-10-30 PROCEDURE — 63710000001 INSULIN DETEMIR PER 5 UNITS: Performed by: INTERNAL MEDICINE

## 2022-10-30 PROCEDURE — 63710000001 INSULIN LISPRO (HUMAN) PER 5 UNITS: Performed by: INTERNAL MEDICINE

## 2022-10-30 PROCEDURE — 25010000002 CEFTRIAXONE PER 250 MG: Performed by: INTERNAL MEDICINE

## 2022-10-30 PROCEDURE — 25010000002 VANCOMYCIN PER 500 MG

## 2022-10-30 PROCEDURE — 99232 SBSQ HOSP IP/OBS MODERATE 35: CPT | Performed by: NURSE PRACTITIONER

## 2022-10-30 RX ORDER — ARIPIPRAZOLE 10 MG/1
10 TABLET ORAL ONCE
Status: COMPLETED | OUTPATIENT
Start: 2022-10-30 | End: 2022-10-30

## 2022-10-30 RX ADMIN — OLANZAPINE 10 MG: 5 TABLET, FILM COATED ORAL at 20:26

## 2022-10-30 RX ADMIN — INSULIN LISPRO 2 UNITS: 100 INJECTION, SOLUTION INTRAVENOUS; SUBCUTANEOUS at 11:31

## 2022-10-30 RX ADMIN — Medication 10 ML: at 10:15

## 2022-10-30 RX ADMIN — LISINOPRIL 40 MG: 40 TABLET ORAL at 08:26

## 2022-10-30 RX ADMIN — HEPARIN SODIUM 5000 UNITS: 5000 INJECTION INTRAVENOUS; SUBCUTANEOUS at 05:42

## 2022-10-30 RX ADMIN — SODIUM CHLORIDE 2 G: 900 INJECTION INTRAVENOUS at 10:15

## 2022-10-30 RX ADMIN — LORAZEPAM 0.25 MG: 0.5 TABLET ORAL at 08:26

## 2022-10-30 RX ADMIN — AMLODIPINE BESYLATE 10 MG: 10 TABLET ORAL at 08:26

## 2022-10-30 RX ADMIN — HEPARIN SODIUM 5000 UNITS: 5000 INJECTION INTRAVENOUS; SUBCUTANEOUS at 20:23

## 2022-10-30 RX ADMIN — POLYETHYLENE GLYCOL 3350 17 G: 17 POWDER, FOR SOLUTION ORAL at 17:32

## 2022-10-30 RX ADMIN — FERROUS SULFATE TAB 325 MG (65 MG ELEMENTAL FE) 325 MG: 325 (65 FE) TAB at 08:26

## 2022-10-30 RX ADMIN — VANCOMYCIN HYDROCHLORIDE 1000 MG: 1 INJECTION, SOLUTION INTRAVENOUS at 00:24

## 2022-10-30 RX ADMIN — VANCOMYCIN HYDROCHLORIDE 1000 MG: 1 INJECTION, SOLUTION INTRAVENOUS at 11:30

## 2022-10-30 RX ADMIN — INSULIN LISPRO 8 UNITS: 100 INJECTION, SOLUTION INTRAVENOUS; SUBCUTANEOUS at 11:30

## 2022-10-30 RX ADMIN — OLANZAPINE 5 MG: 5 TABLET, FILM COATED ORAL at 08:26

## 2022-10-30 RX ADMIN — INSULIN LISPRO 8 UNITS: 100 INJECTION, SOLUTION INTRAVENOUS; SUBCUTANEOUS at 08:26

## 2022-10-30 RX ADMIN — LORAZEPAM 0.25 MG: 0.5 TABLET ORAL at 20:23

## 2022-10-30 RX ADMIN — INSULIN DETEMIR 10 UNITS: 100 INJECTION, SOLUTION SUBCUTANEOUS at 20:23

## 2022-10-30 RX ADMIN — ARIPIPRAZOLE 10 MG: 10 TABLET ORAL at 20:23

## 2022-10-30 RX ADMIN — HEPARIN SODIUM 5000 UNITS: 5000 INJECTION INTRAVENOUS; SUBCUTANEOUS at 13:01

## 2022-10-30 RX ADMIN — Medication 10 ML: at 10:16

## 2022-10-31 PROBLEM — R41.9 NEUROCOGNITIVE DISORDER: Status: ACTIVE | Noted: 2022-10-31

## 2022-10-31 LAB
ALBUMIN SERPL-MCNC: 2.9 G/DL (ref 3.5–5.2)
ALBUMIN/GLOB SERPL: 0.7 G/DL
ALP SERPL-CCNC: 106 U/L (ref 39–117)
ALT SERPL W P-5'-P-CCNC: 16 U/L (ref 1–41)
ANION GAP SERPL CALCULATED.3IONS-SCNC: 10 MMOL/L (ref 5–15)
AST SERPL-CCNC: 11 U/L (ref 1–40)
BASOPHILS # BLD AUTO: 0.03 10*3/MM3 (ref 0–0.2)
BASOPHILS NFR BLD AUTO: 0.3 % (ref 0–1.5)
BILIRUB SERPL-MCNC: <0.2 MG/DL (ref 0–1.2)
BUN SERPL-MCNC: 19 MG/DL (ref 8–23)
BUN/CREAT SERPL: 17.8 (ref 7–25)
CALCIUM SPEC-SCNC: 8.6 MG/DL (ref 8.6–10.5)
CHLORIDE SERPL-SCNC: 98 MMOL/L (ref 98–107)
CO2 SERPL-SCNC: 24 MMOL/L (ref 22–29)
CREAT SERPL-MCNC: 1.07 MG/DL (ref 0.76–1.27)
DEPRECATED RDW RBC AUTO: 46 FL (ref 37–54)
EGFRCR SERPLBLD CKD-EPI 2021: 77 ML/MIN/1.73
EOSINOPHIL # BLD AUTO: 0.15 10*3/MM3 (ref 0–0.4)
EOSINOPHIL NFR BLD AUTO: 1.6 % (ref 0.3–6.2)
ERYTHROCYTE [DISTWIDTH] IN BLOOD BY AUTOMATED COUNT: 14 % (ref 12.3–15.4)
GLOBULIN UR ELPH-MCNC: 3.9 GM/DL
GLUCOSE BLDC GLUCOMTR-MCNC: 103 MG/DL (ref 70–130)
GLUCOSE BLDC GLUCOMTR-MCNC: 155 MG/DL (ref 70–130)
GLUCOSE BLDC GLUCOMTR-MCNC: 212 MG/DL (ref 70–130)
GLUCOSE BLDC GLUCOMTR-MCNC: 87 MG/DL (ref 70–130)
GLUCOSE SERPL-MCNC: 159 MG/DL (ref 65–99)
HCT VFR BLD AUTO: 24.5 % (ref 37.5–51)
HEMOCCULT STL QL: NEGATIVE
HGB BLD-MCNC: 7.6 G/DL (ref 13–17.7)
IMM GRANULOCYTES # BLD AUTO: 0.04 10*3/MM3 (ref 0–0.05)
IMM GRANULOCYTES NFR BLD AUTO: 0.4 % (ref 0–0.5)
LYMPHOCYTES # BLD AUTO: 1.45 10*3/MM3 (ref 0.7–3.1)
LYMPHOCYTES NFR BLD AUTO: 15.2 % (ref 19.6–45.3)
MCH RBC QN AUTO: 28 PG (ref 26.6–33)
MCHC RBC AUTO-ENTMCNC: 31 G/DL (ref 31.5–35.7)
MCV RBC AUTO: 90.4 FL (ref 79–97)
MONOCYTES # BLD AUTO: 0.72 10*3/MM3 (ref 0.1–0.9)
MONOCYTES NFR BLD AUTO: 7.5 % (ref 5–12)
NEUTROPHILS NFR BLD AUTO: 7.16 10*3/MM3 (ref 1.7–7)
NEUTROPHILS NFR BLD AUTO: 75 % (ref 42.7–76)
NRBC BLD AUTO-RTO: 0 /100 WBC (ref 0–0.2)
PLATELET # BLD AUTO: 259 10*3/MM3 (ref 140–450)
PMV BLD AUTO: 10.8 FL (ref 6–12)
POTASSIUM SERPL-SCNC: 3.7 MMOL/L (ref 3.5–5.2)
PROT SERPL-MCNC: 6.8 G/DL (ref 6–8.5)
RBC # BLD AUTO: 2.71 10*6/MM3 (ref 4.14–5.8)
SODIUM SERPL-SCNC: 132 MMOL/L (ref 136–145)
VANCOMYCIN SERPL-MCNC: 21.2 MCG/ML (ref 5–40)
WBC NRBC COR # BLD: 9.55 10*3/MM3 (ref 3.4–10.8)

## 2022-10-31 PROCEDURE — 82272 OCCULT BLD FECES 1-3 TESTS: CPT | Performed by: NURSE PRACTITIONER

## 2022-10-31 PROCEDURE — 63710000001 INSULIN LISPRO (HUMAN) PER 5 UNITS: Performed by: ORTHOPAEDIC SURGERY

## 2022-10-31 PROCEDURE — 63710000001 INSULIN LISPRO (HUMAN) PER 5 UNITS: Performed by: INTERNAL MEDICINE

## 2022-10-31 PROCEDURE — 85025 COMPLETE CBC W/AUTO DIFF WBC: CPT

## 2022-10-31 PROCEDURE — 25010000002 CEFTRIAXONE PER 250 MG: Performed by: INTERNAL MEDICINE

## 2022-10-31 PROCEDURE — 25010000002 VANCOMYCIN PER 500 MG

## 2022-10-31 PROCEDURE — 80053 COMPREHEN METABOLIC PANEL: CPT | Performed by: NURSE PRACTITIONER

## 2022-10-31 PROCEDURE — 82962 GLUCOSE BLOOD TEST: CPT

## 2022-10-31 PROCEDURE — 80202 ASSAY OF VANCOMYCIN: CPT

## 2022-10-31 PROCEDURE — 63710000001 INSULIN DETEMIR PER 5 UNITS: Performed by: INTERNAL MEDICINE

## 2022-10-31 PROCEDURE — 99232 SBSQ HOSP IP/OBS MODERATE 35: CPT | Performed by: NURSE PRACTITIONER

## 2022-10-31 PROCEDURE — 25010000002 HEPARIN (PORCINE) PER 1000 UNITS: Performed by: ORTHOPAEDIC SURGERY

## 2022-10-31 RX ADMIN — VANCOMYCIN HYDROCHLORIDE 750 MG: 750 INJECTION, SOLUTION INTRAVENOUS at 14:32

## 2022-10-31 RX ADMIN — FERROUS SULFATE TAB 325 MG (65 MG ELEMENTAL FE) 325 MG: 325 (65 FE) TAB at 08:33

## 2022-10-31 RX ADMIN — Medication 10 ML: at 08:34

## 2022-10-31 RX ADMIN — HEPARIN SODIUM 5000 UNITS: 5000 INJECTION INTRAVENOUS; SUBCUTANEOUS at 05:09

## 2022-10-31 RX ADMIN — INSULIN LISPRO 2 UNITS: 100 INJECTION, SOLUTION INTRAVENOUS; SUBCUTANEOUS at 08:34

## 2022-10-31 RX ADMIN — HEPARIN SODIUM 5000 UNITS: 5000 INJECTION INTRAVENOUS; SUBCUTANEOUS at 14:32

## 2022-10-31 RX ADMIN — INSULIN LISPRO 8 UNITS: 100 INJECTION, SOLUTION INTRAVENOUS; SUBCUTANEOUS at 17:26

## 2022-10-31 RX ADMIN — AMLODIPINE BESYLATE 10 MG: 10 TABLET ORAL at 08:33

## 2022-10-31 RX ADMIN — INSULIN DETEMIR 10 UNITS: 100 INJECTION, SOLUTION SUBCUTANEOUS at 20:25

## 2022-10-31 RX ADMIN — INSULIN LISPRO 3 UNITS: 100 INJECTION, SOLUTION INTRAVENOUS; SUBCUTANEOUS at 17:26

## 2022-10-31 RX ADMIN — SENNOSIDES AND DOCUSATE SODIUM 2 TABLET: 50; 8.6 TABLET ORAL at 20:24

## 2022-10-31 RX ADMIN — LORAZEPAM 0.25 MG: 0.5 TABLET ORAL at 11:42

## 2022-10-31 RX ADMIN — INSULIN LISPRO 8 UNITS: 100 INJECTION, SOLUTION INTRAVENOUS; SUBCUTANEOUS at 08:33

## 2022-10-31 RX ADMIN — Medication 5 MG: at 20:24

## 2022-10-31 RX ADMIN — HEPARIN SODIUM 5000 UNITS: 5000 INJECTION INTRAVENOUS; SUBCUTANEOUS at 20:24

## 2022-10-31 RX ADMIN — OLANZAPINE 10 MG: 5 TABLET, FILM COATED ORAL at 20:24

## 2022-10-31 RX ADMIN — LISINOPRIL 40 MG: 40 TABLET ORAL at 08:33

## 2022-10-31 RX ADMIN — OLANZAPINE 5 MG: 5 TABLET, FILM COATED ORAL at 08:37

## 2022-10-31 RX ADMIN — Medication 10 ML: at 20:24

## 2022-10-31 RX ADMIN — VANCOMYCIN HYDROCHLORIDE 1000 MG: 1 INJECTION, SOLUTION INTRAVENOUS at 00:47

## 2022-10-31 RX ADMIN — SODIUM CHLORIDE 2 G: 900 INJECTION INTRAVENOUS at 11:44

## 2022-10-31 RX ADMIN — Medication: at 13:13

## 2022-10-31 RX ADMIN — LORAZEPAM 0.25 MG: 0.5 TABLET ORAL at 17:25

## 2022-11-01 LAB
GLUCOSE BLDC GLUCOMTR-MCNC: 111 MG/DL (ref 70–130)
GLUCOSE BLDC GLUCOMTR-MCNC: 118 MG/DL (ref 70–130)
GLUCOSE BLDC GLUCOMTR-MCNC: 128 MG/DL (ref 70–130)
GLUCOSE BLDC GLUCOMTR-MCNC: 145 MG/DL (ref 70–130)
GLUCOSE BLDC GLUCOMTR-MCNC: 176 MG/DL (ref 70–130)

## 2022-11-01 PROCEDURE — 63710000001 INSULIN LISPRO (HUMAN) PER 5 UNITS: Performed by: INTERNAL MEDICINE

## 2022-11-01 PROCEDURE — 99024 POSTOP FOLLOW-UP VISIT: CPT | Performed by: ORTHOPAEDIC SURGERY

## 2022-11-01 PROCEDURE — 25010000002 CEFTRIAXONE PER 250 MG: Performed by: INTERNAL MEDICINE

## 2022-11-01 PROCEDURE — 63710000001 INSULIN DETEMIR PER 5 UNITS: Performed by: INTERNAL MEDICINE

## 2022-11-01 PROCEDURE — 25010000002 HEPARIN (PORCINE) PER 1000 UNITS: Performed by: ORTHOPAEDIC SURGERY

## 2022-11-01 PROCEDURE — 82962 GLUCOSE BLOOD TEST: CPT

## 2022-11-01 PROCEDURE — 99232 SBSQ HOSP IP/OBS MODERATE 35: CPT | Performed by: INTERNAL MEDICINE

## 2022-11-01 PROCEDURE — 63710000001 INSULIN LISPRO (HUMAN) PER 5 UNITS: Performed by: ORTHOPAEDIC SURGERY

## 2022-11-01 PROCEDURE — 25010000002 VANCOMYCIN PER 500 MG

## 2022-11-01 RX ADMIN — HEPARIN SODIUM 5000 UNITS: 5000 INJECTION INTRAVENOUS; SUBCUTANEOUS at 06:25

## 2022-11-01 RX ADMIN — HEPARIN SODIUM 5000 UNITS: 5000 INJECTION INTRAVENOUS; SUBCUTANEOUS at 14:08

## 2022-11-01 RX ADMIN — POLYETHYLENE GLYCOL 3350 17 G: 17 POWDER, FOR SOLUTION ORAL at 08:25

## 2022-11-01 RX ADMIN — SODIUM CHLORIDE 2 G: 900 INJECTION INTRAVENOUS at 11:30

## 2022-11-01 RX ADMIN — Medication 5 MG: at 19:53

## 2022-11-01 RX ADMIN — INSULIN LISPRO 8 UNITS: 100 INJECTION, SOLUTION INTRAVENOUS; SUBCUTANEOUS at 17:26

## 2022-11-01 RX ADMIN — INSULIN DETEMIR 10 UNITS: 100 INJECTION, SOLUTION SUBCUTANEOUS at 19:58

## 2022-11-01 RX ADMIN — SENNOSIDES AND DOCUSATE SODIUM 2 TABLET: 50; 8.6 TABLET ORAL at 19:53

## 2022-11-01 RX ADMIN — Medication 10 ML: at 08:26

## 2022-11-01 RX ADMIN — VANCOMYCIN HYDROCHLORIDE 750 MG: 750 INJECTION, SOLUTION INTRAVENOUS at 02:29

## 2022-11-01 RX ADMIN — INSULIN LISPRO 8 UNITS: 100 INJECTION, SOLUTION INTRAVENOUS; SUBCUTANEOUS at 12:13

## 2022-11-01 RX ADMIN — INSULIN LISPRO 8 UNITS: 100 INJECTION, SOLUTION INTRAVENOUS; SUBCUTANEOUS at 08:25

## 2022-11-01 RX ADMIN — Medication 10 ML: at 19:54

## 2022-11-01 RX ADMIN — INSULIN LISPRO 2 UNITS: 100 INJECTION, SOLUTION INTRAVENOUS; SUBCUTANEOUS at 12:13

## 2022-11-01 RX ADMIN — FERROUS SULFATE TAB 325 MG (65 MG ELEMENTAL FE) 325 MG: 325 (65 FE) TAB at 08:25

## 2022-11-01 RX ADMIN — OLANZAPINE 5 MG: 5 TABLET, FILM COATED ORAL at 08:26

## 2022-11-01 RX ADMIN — AMLODIPINE BESYLATE 10 MG: 10 TABLET ORAL at 08:25

## 2022-11-01 RX ADMIN — VANCOMYCIN HYDROCHLORIDE 750 MG: 750 INJECTION, SOLUTION INTRAVENOUS at 14:09

## 2022-11-01 RX ADMIN — HEPARIN SODIUM 5000 UNITS: 5000 INJECTION INTRAVENOUS; SUBCUTANEOUS at 19:53

## 2022-11-01 RX ADMIN — LISINOPRIL 40 MG: 40 TABLET ORAL at 08:25

## 2022-11-01 RX ADMIN — OLANZAPINE 10 MG: 5 TABLET, FILM COATED ORAL at 19:53

## 2022-11-01 RX ADMIN — LORAZEPAM 0.25 MG: 0.5 TABLET ORAL at 12:13

## 2022-11-01 NOTE — PLAN OF CARE
Goal Outcome Evaluation:         Pt is alert and oriented to self; confused. VSS on room air to 2L NC at night. Pt has slept the majority of the shift. No c/o of pain. PRN melatonin given as ordered. Splint in place/ CDI. Up to the bedside commode with an assist x2-3, gait belt, and walker. Small BM this shift. Occult stool collected this shift; negative. Bladder scanned around 0500 with 572mL; pt refusing to be I&O cathed; has been very sleepy this shift and wants to wait to wake up a bit more to void on his own. Will continue to monitor.

## 2022-11-01 NOTE — PROGRESS NOTES
"          Orthopaedic Surgery Progress Note    LOS: 18 days   Patient Care Team:  Provider, No Known as PCP - General  POD 14 Days Post-Op   Procedure(s):  PARTIAL FIRST RAY AMPUTATION LEFT  Subjective   Interval History:   Resting in bed, pleasantly confused, appears comfortable.    Objective     Vital Signs:  Temp (24hrs), Av.4 °F (36.9 °C), Min:98 °F (36.7 °C), Max:99.2 °F (37.3 °C)    /60 (BP Location: Right arm, Patient Position: Lying)   Pulse 61   Temp 98.3 °F (36.8 °C) (Oral)   Resp 17   Ht 182.9 cm (72\")   Wt 86.6 kg (191 lb)   SpO2 94%   BMI 25.90 kg/m²     Labs:  Lab Results (last 24 hours)     Procedure Component Value Units Date/Time    POC Glucose Once [[id number]]  (Abnormal) Collected: 22 1111    Specimen: Blood Updated: 22 1113     Glucose 176 mg/dL      Comment: Meter: [id number] : [id number] [employer]       POC Glucose Once [[id number]]  (Normal) Collected: 22 0714    Specimen: Blood Updated: 22 0725     Glucose 111 mg/dL      Comment: Meter: [id number] : [id number] [facility]       Occult Blood X 1, Stool - Stool, Per Rectum [[id number]]  (Normal) Collected: 10/31/22 2037    Specimen: Stool from Per Rectum Updated: 10/31/22 2131     Fecal Occult Blood Negative    POC Glucose Once [[id number]]  (Normal) Collected: 10/31/22 2013    Specimen: Blood Updated: 10/31/22 2014     Glucose 103 mg/dL      Comment: Meter: [id number] : [id number] Mere Mcdaniel       POC Glucose Once [[id number]]  (Abnormal) Collected: 10/31/22 1630    Specimen: Blood Updated: 10/31/22 1631     Glucose 212 mg/dL      Comment: Meter: [id number] : [id number] Marga Kerr       Vancomycin, Random [[id number]]  (Normal) Collected: 10/31/22 1250    Specimen: Blood Updated: 10/31/22 1334     Vancomycin Random 21.20 mcg/mL     Narrative:      Therapeutic Ranges for Vancomycin    Vancomycin Random   5.0-40.0 mcg/mL  Vancomycin Trough   5.0-20.0 mcg/mL  Vancomycin Peak     " 20.0-40.0 mcg/mL          Physical Exam:  left LE: Leg compartments soft/compressible              Dressing over medial foot taken down for exam              Incision clean dry and intact, sutures in place, no drainage, some evidence of necrosis at the skin edges at the incision distally (see photo in chart)              Lesser toes are warm and well perfused    Assessment & Plan   -Operative dressing changed 11/1, incision CDI with sutures in place  -NWB LLE, keep splint clean and dry  -DVT prophylaxis, mechanical and subq hep, rec DC home on  Daily (x30 days)  -Antibiotics per ID recommendations  -Pain control  -Elevate operative extremity  -Okay for DC from ortho standpoint, f/u in clinic next week for wound check/suture removal  -Rest of management per primary team    Yeison Petty MD  11/01/22  13:05 EDT

## 2022-11-01 NOTE — PROGRESS NOTES
INFECTIOUS DISEASE Progress Note    Omkar Nava  1955  [id number]    Consult: 10/15/22  Admit date: 10/14/2022    Requesting Provider: Omkar Seth MD  Evaluating physician:  Rayray Gordon MD  Reason for Consultation: left foot osteomyelitis, first toe, distal phalanx  Chief Complaint: left foot pain      Subjective   History of present illness:  Patient is a  67 y.o. male with h/o T2DM, PN, HTN, normocytic anemia, and GERD who presented to [facility] ED on 10/14 for worsening left foot pain.  The patient is a poor historian and most of the information was taken from the chart.  He tripped at his nursing facility, [facility], while running the street in flip-flops and sustained a laceration of his first webspace.  He was taken to [facility] ED on 9/17/22 and found to have a small intra-articular vertical fracture through the base the the great toe proximal phalanx. Podiatry, Dr. Ruiz, saw patient and the wound with irrigated with suture placement.  The area was dressed and he was placed in a surgical shoe.  He was given Cefazolin x 1 dose and discharged back to his facility on Keflex for 7 days.  On 9/26/22, the nursing staff at increased pain, swelling, and redness around the foot and sent him to [facility] ED on that day.  He was found to have a displaced fracture of proximal phalanx of left hallux along with abscess and gas in tissues.  He was admitted to the hospital and underwent Left foot debridement and I and D on 9/27/22 by Dr. Ruiz with culture positive for MRSA (Resistant to Cefazolin, clindamycin, erm, oxacillin, tetracycline, Bactrim and sensitive to Vancomycin, Daptomycin-KB 1, and Linezolid KB 2), Corynebacterium striatum, and Enterococcus faecalis (sens to Vanc and ampicillin).  He underwent a second debridement and I and D on 9/30.  He was given Cefazolin in ED and then changed to Daptomycin.  A PICC line was placed on 10/3 for Daptomycin infusions,  but the antibiotic was too expensive for the SNF, so he was changed to oral Zyvox and Rifampin until 10/26.  The PICC line was removed on 10/4.  His blood cultures remained negative.  He was discharged back to his nursing facility on 10/4/22.      He was sent to [facility] ED on 10/14 after nursing staff noted that his left hallux appeared necrotic.  He has had no fever, chills, shortness of breath, nausea, vomiting, diarrhea, dysuria, or worsening foot pain.  On arrival, he is afebrile and hemodynamically stable.  On 10/15, his BP went up to 203/81.  Admitting labs were WBC 7200 with 69% neutrophils, ESR 67, CRP 3.07, Hgb 9.2, PCT 0.05, lactic acid 2.3, and creatinine 1.21.  A MRSA PCR was negative.  Blood and wound cultures are pending.  An MRI of the left foot on 10/15 showed wound around the left great toe with marrow signal alteration within the distal aspect of 1st MT as well as both phalanges c/w osteomyelitis and scattered foci in soft tissues that may be foci of gas without evidence of fluid collections or abscesses. An Xray of left foot showed soft tissue swelling of 1st digit with displaced fracture of medial base of 1st proximal phalanx.  He is currently on Cefepime and Vancomycin.  ID was asked by Dr. Seth on 10/15 to evaluate and manage his antibiotic therapy.  Patient has had some issues with psychosis over the past 6 months possibly related to drug and alcohol use.  This is also interfered with his acceptance of care.    10/16/2022 history reviewed.  Patient more pleasant this morning.  No high fevers or chills.  Tolerating vancomycin and cefepime, duration to be determined.  Orthopedics following.  Previous history of MRSA.    10/17/22: history reviewed.  Patient with minimal response. Tmax 99.6.  On Vancomycin and Cefepime for left hallux osteomyelitis and surrounding infection with duration to be determined.  Awaiting evaluate by Dr. Petty as per Dr. Lieberman's note.  More cooperative.    10/18/2022 history  reviewed.  Patient awaiting a left hallux ray amputation by orthopedic surgery.  Tolerating vancomycin and cefepime.    10/19/2022 history reviewed.  The patient underwent a first toe ray resection on his left foot by Dr. Yeison Petty on 10/18/2022.  Continues on vancomycin and cefepime until 11/25/2022.  No high fevers or chills.    10/20/2022 history reviewed.  Status post left foot first ray toe resection on 10/18, with previous history of MRSA, and new infection with Raoultella ornithinolytica.  Tolerating vancomycin and ceftriaxone until 11/25/2022.  Waiting on placement.    10/24/2022 history reviewed.  No high fevers.  Status post left foot first ray toe resection on 10/18, with previous history of MRSA, and new infection with Raoultella ornithinolytica.  Continues on ceftriaxone and vancomycin until 11/25/2022.    10/25/2022 history reviewed.  Status post left foot first toe resection 10/18/2022 with cultures previously positive for MRSA, and now for Klebsiella and Raoultella.  Mental status has been a limiting factor with his psychiatric history in terms of treatment.  He is tolerating vancomycin and ceftriaxone to continue until 11/25/2022.  Placement is in progress.    10/26/2022 history reviewed.  Status post left first toe resection 10/18/2022 for osteomyelitis.  Tolerating ceftriaxone and vancomycin treating MRSA, Klebsiella, and other bacteria until 11/25/2022.  Awaiting placement, especially given his mental status and psychiatric history.  No high fever.  Pain controlled.    10/27/2022 history reviewed.  Continues on vancomycin and ceftriaxone until 11/25/2022 for left first toe osteomyelitis.  Status post resection 10/18/2022.  Awaiting placement.  No high fever.    10/31/2022 history reviewed.  Continues on antibiotics for left first toe osteomyelitis until 11/25.  Status post resection 10/18.  Confused off and on.  Awaiting placement.  No fever.    11/1/2022 history reviewed.  Tolerating  ceftriaxone and vancomycin until 11/25/2022 for left first toe osteomyelitis status post resection 10/18.  Pleasant but confused.  Awaiting placement.  No fever.  No pain.    Past Medical History:   Diagnosis Date   • Diabetes mellitus (HCC)    • GERD (gastroesophageal reflux disease)    HTN  Psychotic d/o, unspecified.    Past Surgical History:   Procedure Laterality Date   • AMPUTATION FOOT Left 10/18/2022    Procedure: PARTIAL FIRST RAY AMPUTATION LEFT;  Surgeon: Yeison Petty MD;  Location: [facility];  Service: Orthopedics;  Laterality: Left;   • EYE SURGERY     Left foot debridement/I and D x 2 9/27/22 and 9/30/22.  Left first ray amputation 10/18/2022.    Pediatric History   Patient Parents   • Not on file     Other Topics Concern   • Not on file   Social History Narrative    Caffeine 0-1 servings per day    Patient lives at home .   [facility]  Former smoker, no alcohol, smokes marijuana.     family history includes Diabetes in his brother, brother, father, maternal grandfather, maternal grandmother, mother, and sister; Hypertension in his brother, brother, father, and mother.    No Known Allergies      There is no immunization history on file for this patient.    Medication:    Current Facility-Administered Medications:   •  acetaminophen (TYLENOL) tablet 650 mg, 650 mg, Oral, Q4H PRN, 650 mg at 10/28/22 2012 **OR** acetaminophen (TYLENOL) suppository 650 mg, 650 mg, Rectal, Q4H PRN, Yeison Petty MD  •  amLODIPine (NORVASC) tablet 10 mg, 10 mg, Oral, Q24H, Shahbaz Kuhn MD, 10 mg at 11/01/22 0825  •  bisacodyl (DULCOLAX) suppository 10 mg, 10 mg, Rectal, Daily PRN, Yeison Petty MD  •  cefTRIAXone (ROCEPHIN) 2 g/100 mL 0.9% NS IVPB (MBP), 2 g, Intravenous, Q24H, Rayray Gordon MD, Last Rate: 200 mL/hr at 10/28/22 1109, 2 g at 11/01/22 1130  •  dextrose (D50W) (25 g/50 mL) IV injection 25 g, 25 g, Intravenous, Q15 Min PRN, Yeison Petty MD  •  dextrose (GLUTOSE)  oral gel 15 g, 15 g, Oral, Q15 Min PRN, Yeison Petty MD  •  ferrous sulfate tablet 325 mg, 325 mg, Oral, Daily With Breakfast, Yeison Petty MD, 325 mg at 22  •  glucagon (human recombinant) (GLUCAGEN DIAGNOSTIC) injection 1 mg, 1 mg, Intramuscular, Q15 Min PRN, Yeison Petty MD  •  heparin (porcine) 5000 UNIT/ML injection 5,000 Units, 5,000 Units, Subcutaneous, Q8H, Yeison Petty MD, 5,000 Units at 22  •  insulin detemir (LEVEMIR) injection 10 Units, 10 Units, Subcutaneous, Nightly, Shahbaz Kuhn MD, 10 Units at 10/31/22 2025  •  Insulin Lispro (humaLOG) injection 0-7 Units, 0-7 Units, Subcutaneous, TID AC, Yeison Petty MD, 3 Units at 10/31/22 1726  •  Insulin Lispro (humaLOG) injection 8 Units, 8 Units, Subcutaneous, TID With Meals, Shahbaz Kuhn MD, 8 Units at 22  •  lisinopril (PRINIVIL,ZESTRIL) tablet 40 mg, 40 mg, Oral, Daily, Shahbaz Kuhn MD, 40 mg at 22  •  LORazepam (ATIVAN) tablet 0.25 mg, 0.25 mg, Oral, Q6H PRN, Nicole Henriquez DO, 0.25 mg at 10/31/22 1725  •  magnesium sulfate 4 gram infusion - Mg less than or equal to 1mg/dL, 4 g, Intravenous, PRN **OR** magnesium sulfate 3 gram infusion (1gm x 3) - Mg 1.1 - 1.5 mg/dL, 1 g, Intravenous, PRN **OR** Magnesium Sulfate 2 gram infusion- Mg 1.6 - 1.9 mg/dL, 2 g, Intravenous, PRN, Yeison Petty MD  •  melatonin tablet 5 mg, 5 mg, Oral, Nightly PRN, Yeison Petty MD, 5 mg at 10/31/22 2024  •  [] HYDROmorphone (DILAUDID) injection 0.5 mg, 0.5 mg, Intravenous, Q2H PRN, 0.5 mg at 10/24/22 1449 **AND** naloxone (NARCAN) injection 0.1 mg, 0.1 mg, Intravenous, Q5 Min PRN, Yeison Petty MD  •  OLANZapine (zyPREXA) tablet 5 mg, 5 mg, Oral, Daily, 5 mg at 22 08 **AND** OLANZapine (zyPREXA) tablet 10 mg, 10 mg, Oral, Nightly, Nicole Henriquez DO, 10 mg at 10/31/22 2024  •  polyethylene glycol (MIRALAX) packet 17 g, 17 g, Oral, PRN, Yeison Petty MD, 17 g at 22 08  •   "sennosides-docusate (PERICOLACE) 8.6-50 MG per tablet 2 tablet, 2 tablet, Oral, Nightly, Yeison Petty MD, 2 tablet at 10/31/22 2024  •  Insert peripheral IV, , , Once **AND** sodium chloride 0.9 % flush 10 mL, 10 mL, Intravenous, PRN, Yeison Petty MD  •  sodium chloride 0.9 % flush 10 mL, 10 mL, Intravenous, Q12H, Yeison Petty MD, 10 mL at 10/30/22 1016  •  sodium chloride 0.9 % flush 10 mL, 10 mL, Intravenous, PRN, Yeison Petty MD  •  sodium chloride 0.9 % flush 10 mL, 10 mL, Intravenous, Q12H, Nicole Henriquez DO, 10 mL at 11/01/22 0826  •  sodium chloride 0.9 % flush 10 mL, 10 mL, Intravenous, PRN, Nicole Henriquez DO  •  vancomycin in dextrose 5% 150 mL (VANCOCIN) IVPB 750 mg, 750 mg, Intravenous, Q12H, Carolina Arreaga, RPH, 750 mg at 11/01/22 0229  •  ziprasidone (GEODON) injection 20 mg, 20 mg, Intramuscular, Q4H PRN, Yeison Petty MD, 20 mg at 10/27/22 0048    Please refer to the medical record for a full medication list    Review of Systems:  Unable to get a reliable history as patient is responding with yes and no to basic questions and has a psychotic disorder.  Denies fever, chills, nausea, vomiting, diarrhea, shortness of breath, dysuria, or rashes.    Physical Exam:   Vital Signs   Temp:  [98 °F (36.7 °C)-99.2 °F (37.3 °C)] 98.3 °F (36.8 °C)  Heart Rate:  [61-74] 61  Resp:  [17-18] 17  BP: (120-129)/(60-87) 126/60    Temp  Min: 98 °F (36.7 °C)  Max: 99.2 °F (37.3 °C)  BP  Min: 120/87  Max: 129/65  Pulse  Min: 61  Max: 74  Resp  Min: 17  Max: 18  SpO2  Min: 87 %  Max: 94 %    Blood pressure 126/60, pulse 61, temperature 98.3 °F (36.8 °C), temperature source Oral, resp. rate 17, height 182.9 cm (72\"), weight 86.6 kg (191 lb), SpO2 94 %.  GENERAL: Awake and alert, in minor distress. Appears older than stated age.  Resting in bed.  Watching TV.  HEENT:  Normocephalic, atraumatic.  Oropharynx without thrush.   EYES: No conjunctival injection. No icterus. EOM full.  LYMPHATICS: No lymphadenopathy of " the neck or axillary or inguinal regions.   HEART: No murmur, gallop, or pericardial friction rub. Reg rate rhythm.  LUNGS: Clear to auscultation and percussion. No respiratory distress, no use of accessory muscles.  No wheezes.  ABDOMEN: Soft, nontender, nondistended. No appreciable HSM.  Bowel sounds normal.  SKIN: Warm and dry without cutaneous eruptions.  No nodules. Left foot dressing in place, right arm PICC okay placed 10/27  PSYCHIATRIC: Mental status with some confusion.  But overall pleasant.  EXT: Left foot surgical dressing in place.  No crepitus, some drainage from webspace. Normal range of motion.  NEURO: Oriented to name, nonfocal.  Follows some commands.    Results Review:   I reviewed the patient's new clinical results.  I reviewed the patient's new imaging results and agree with the interpretation.  I reviewed the patient's other test results and agree with the interpretation    Results from last 7 days   Lab Units 10/31/22  0535 10/26/22  0527   WBC 10*3/mm3 9.55 6.87   HEMOGLOBIN g/dL 7.6* 8.3*   HEMATOCRIT % 24.5* 27.4*   PLATELETS 10*3/mm3 259 284     Results from last 7 days   Lab Units 10/31/22  0535   SODIUM mmol/L 132*   POTASSIUM mmol/L 3.7   CHLORIDE mmol/L 98   CO2 mmol/L 24.0   BUN mg/dL 19   CREATININE mg/dL 1.07   GLUCOSE mg/dL 159*   CALCIUM mg/dL 8.6     Results from last 7 days   Lab Units 10/31/22  0535   ALK PHOS U/L 106   BILIRUBIN mg/dL <0.2   ALT (SGPT) U/L 16   AST (SGOT) U/L 11         Results from last 7 days   Lab Units 10/26/22  0527   CRP mg/dL 3.10*     Results from last 7 days   Lab Units 10/31/22  1250 10/27/22  1106   VANCOMYCIN RM mcg/mL 21.20 18.70         Estimated Creatinine Clearance: 84.3 mL/min (by C-G formula based on SCr of 1.07 mg/dL).  CPK    Common Labsle 10/19/22   Creatine Kinase 119            Procalitonin Results:       Brief Urine Lab Results  (Last result in the past 365 days)      Color   Clarity   Blood   Leuk Est   Nitrite   Protein   CREAT    Urine HCG        06/12/22 2056 Yellow   Clear     Negative                    No results found for: SITE, ALLENTEST, PHART, MLT8QLF, PO2ART, RGD2PAQ, BASEEXCESS, T3QYQYDP, HGBBG, HCTABG, OXYHEMOGLOBI, METHHGBN, CARBOXYHGB, CO2CT, BAROMETRIC, MODALITY, FIO2     Microbiology:  Microbiology Results (last 10 days)     Procedure Component Value - Date/Time    COVID PRE-OP / PRE-PROCEDURE SCREENING ORDER (NO ISOLATION) - Swab, Nasopharynx [[id number]]  (Normal) Collected: 10/27/22 0510    Lab Status: Final result Specimen: Swab from Nasopharynx Updated: 10/27/22 0530    Narrative:      The following orders were created for panel order COVID PRE-OP / PRE-PROCEDURE SCREENING ORDER (NO ISOLATION) - Swab, Nasopharynx.  Procedure                               Abnormality         Status                     ---------                               -----------         ------                     COVID-19, ABBOTT IN-HOUS...[[id number]]  Normal              Final result                 Please view results for these tests on the individual orders.    COVID-19, ABBOTT IN-HOUSE,NASAL Swab (NO TRANSPORT MEDIA) 2 HR TAT - Swab, Nasopharynx [[id number]]  (Normal) Collected: 10/27/22 0510    Lab Status: Final result Specimen: Swab from Nasopharynx Updated: 10/27/22 0530     COVID19 Presumptive Negative    Narrative:      Fact sheet for providers: https://www.fda.gov/media/398477/download     Fact sheet for patients: https://www.fda.gov/media/330570/download    Test performed by PCR.  If inconsistent with clinical signs and symptoms patient should be tested with different authorized molecular test.        Blood and wound cultures 10/14 pending.       No results found for: TISSCXQ, CULTURES, CULTURE, BLOODCX, ANACX, BALCX, ACIDFASTCX, BODYFLDCX, CXREFLEX, FUNGUSCX, RESPCX, MRSACX, ROUTCX, BRCHWSHCLT, TISSUECX, URINECX, CMVCX, WOUNDCX, BCIDPCR, BFCULTURE, BLOODCULT(      Radiology:  Imaging Results (Last 72 Hours)     ** No results found for the  last 72 hours. **          IMPRESSION:   1. Left hallux osteomyelitis after trauma/displaced fracture 9/17.  At risk for fracture not healing in the first toe given the infection, noncompliance with walking on foot, and ongoing infection.  Likely gangrene and poor wound healing related to vascular disease.  Improved.  2. 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  Cx with MRSA, Enterococcus faecalis, and Corynebacterium striatum.  Initially treated with Daptomycin and discharge on oral Zyvox and oral Rifampin until 10/26/22.  Cx with Raoultella ornithinolytica (sens to Cefepime, ceftriaxone) and Klebsiella.  Status post left first ray resection 10/18/2022.   3. Lactic acidosis, related to above. Resolved.   4. Elevated inflammatory markers, related to above.  CRP 3.07 on 10/14.  5. Type 2 diabetes mellitus/peripheral neuropathy.  6. Hyperglycemia, related to above.  7. Anemia of chronic disease.  Continues.  8. Essential hypertension.  9. Gastroesophageal reflux disease.  10. Thrombocytosis.  Related to above issues.  Resolved.  11. Psychosis since April 2022 reported by the patient's sister, thought to be related to drug and alcohol use.  Was hospitalized previously at [facility].  Awaiting placement.  12. Hyponatremia, 132, ALT ongoing.  13. H/o MRSA.  14. Hypocalcemia resolved.  15. Elevated alkaline phosphatase resolved.    Tolerating current regimen.  Discussed with nursing and family.    Plan:  1. Diagnostically, follow blood and wound cultures, imaging, physical examination, CBC, CMP, CRP, and vancomycin levels.   2. Therapeutically, continue ceftriaxone and Vancomycin for 6 weeks of IV antibiotics until 11/25/22.  This will cover the organisms including previous MRSA.  Vancomycin dose previously increased to 1 g IV every 12 hours.  3. Orthopedic surgery status post left first ray resection for osteomyelitis 10/18/2022.   4. Awaiting  placement.  5. Continue supportive care.  PICC placement right arm 10/27/2022.    I discussed the patient's findings and my recommendations with patient and his family.  He will need to be in a skilled facility to receive his antibiotics.    Our group would be pleased to follow this patient over the course of their hospitalization and assist with outpatient antimicrobial therapy, as indicated.  Further recommendations depend on the results of the cultures and clinical course. Discussed with patient's sister and family.  Difficult issue with compliance in terms of wound care and staying off his foot.    Case management orders: Please arrange for skilled facility IV antibiotics with ceftriaxone 2 g IV daily, vancomycin 1 g IV every 12 hours to continue until 11/25/2022.  Pharmacy to adjust dose of vancomycin based on weekly trough levels to try to maintain level between 12 and 18.  Check CBC, CMP, CRP, vancomycin trough weekly while on IV antibiotics.  Fax orders to [phone], call [phone] with final arrangements.  The treatment is for left first toe osteomyelitis with MRSA, Klebsiella, and Raoultella ornithinolytica.  Arrange for follow-up with me in 2 weeks post discharge.  Weekly PICC dressing changes.    Rayray Gordon MD  11/1/2022

## 2022-11-01 NOTE — CASE MANAGEMENT/SOCIAL WORK
Discharge Planning Assessment  [facility]     Patient Name: Omkar Nava  MRN: [id number]  Today's Date: 11/1/2022    Admit Date: 10/14/2022    Plan: Ongoing   Discharge Needs Assessment    No documentation.                Discharge Plan     Row Name 11/01/22 1431       Plan    Plan Ongoing    Patient/Family in Agreement with Plan yes    Plan Comments [facility], [facility], and [facility] have all refused Mr. Nava. I have sent referrals to all [facility]. Will await a bed offer. Case mangement will continue to follow.    Final Discharge Disposition Code 30 - still a patient              Continued Care and Services - Admitted Since 10/14/2022     Destination     Service Provider Request Status Selected Services Address Phone Fax Patient Preferred    [facility] Pending - Request Sent N/A [address] [facility] [address] [phone] --    [facility] Pending - Request Sent N/A [address] [facility] [address] [phone] --    [facility] Pending - Request Sent N/A [address] [facility] [address] [phone] --    [facility] Pending - Request Sent N/A [address] [facility] [address] [phone] --    [facility] [place] Pending - Request Sent N/A [address] [facility] [address] [phone] --    [facility] Pending - Request Sent N/A [address] [facility] [address] [phone] --    [facility] Pending - Request Sent N/A [address] [facility] [address] [phone] --    [facility] Pending - Request Sent N/A [address] [facility] [address] [phone] --    [facility] Pending - Request Sent N/A [address] [facility],  [facility] [address] [phone] --    [facility] Pending - Request Sent N/A [address] [facility] [address] [phone] --    [facility] Declined  Facility declined (no reason given) N/A [address], [place] [id number] [phone] --    [facility] Declined  Behavior issues or concerns N/A [address], [place] [address] [phone] --    [facility] Declined  Behavior issues or concerns N/A [address] [place] [id number] [phone] --    [facility] Declined  Behavior issues or concerns N/A [address] [place] [address] [phone] --    [facility] Declined  Behavior issues or concerns N/A [address] [place] [address] [phone] --    [facility] Declined  Behavior issues or concerns N/A [address] [facility] [address] [phone] --    [facility] Declined  Behavior issues or concerns N/A [address], [facility] [id number] [phone] --    [facility] Declined  Behavior issues or concerns N/A [address] [place] [id number] [phone] --    [facility]ined  Behavior issues or concerns N/A [address] [place] [address] [phone] --    [facility] Declined  Behavior issues or concerns N/A [address] [place] [address] [phone] --    [facility] Declined  Behavior issues or concerns N/A [address], [place] [address] [phone] --    [facility] Declined  Behavior issues or concerns N/A [address], [facility] [address] [phone] --              Expected Discharge Date  and Time     Expected Discharge Date Expected Discharge Time    Nov 4, 2022          Demographic Summary    No documentation.                Functional Status    No documentation.                Psychosocial    No documentation.                Abuse/Neglect    No documentation.                Legal    No documentation.                Substance Abuse    No documentation.                Patient Forms    No documentation.                   Josep Sweeney RN

## 2022-11-01 NOTE — DISCHARGE PLACEMENT REQUEST
"Prema Sung (67 y.o. Male)     Date of Birth   1955    Social Security Number       Address   [address] [place] [address]    Home Phone   [phone]    MRN   [id number]       Yazidism   None    Marital Status   Single                            Admission Date   10/14/22    Admission Type   Emergency    Admitting Provider   Nicole Henriquez DO    Attending Provider   Nicole Henriquez DO    Department, Room/Bed   [facility] 3G, S363/1       Discharge Date       Discharge Disposition       Discharge Destination                               Attending Provider: Nicole Henriquez DO    Allergies: No Known Allergies    Isolation: None   Infection: None   Code Status: CPR    Ht: 182.9 cm (72\")   Wt: 86.6 kg (191 lb)    Admission Cmt: None   Principal Problem: Acute osteomyelitis of left foot (HCC) [M86.172]                 Active Insurance as of 10/14/2022     Primary Coverage     Payor Plan Insurance Group Employer/Plan Group    MEDICARE MEDICARE A & B      Payor Plan Address Payor Plan Phone Number Payor Plan Fax Number Effective Dates    [address] [phone]  8/1/2022 - None Entered    formerly [facility] [id number]       Subscriber Name Subscriber Birth Date Member ID       PREMA SUNG 1955 [id number]           Secondary Coverage     Payor Plan Insurance Group Employer/Plan Group    [facility] COMMUNITY PLAN [facility] COMMUNITY PLAN [facility]     Payor Plan Address Payor Plan Phone Number Payor Plan Fax Number Effective Dates    [address]   9/1/2022 - None Entered    [facility] [id number]       Subscriber Name Subscriber Birth Date Member ID       ANA LILIAPREMA RALF 1955 [id number]                 Emergency Contacts      (Rel.) Home Phone Work Phone Mobile Phone    Idalia Lerma (Daughter) -- -- [phone]    CliffordCliveevelin (Sister) [phone] -- --               History & Physical      Scottie Miranda III, DO at 10/14/22 2209      "         [facility]  HISTORY AND PHYSICAL    Patient Name: Omkar Nava  : 1955  MRN: [id number]  Primary Care Physician: Provider, No Known  Date of admission: 10/14/2022    Subjective    Subjective     Chief Complaint:  Left foot wound    HPI:  Omkar Nava is a 67 y.o. male past medical history significant for diabetes mellitus type 2, essential hypertension and unspecified psychotic disorder presents to the ED due to worsening left foot wound.  Patient is confused and is a poor historian therefore HPI is limited.  Patient presented to [facility] on 2022 due to mechanical fall causing open articular fracture on the left foot.  He was treated at that time with bedside flushing and repair by podiatry.  He was given Ancef and sent back to nursing facility on Keflex.  Wound cultures at that time grew MRSA however blood culture showed no growth.  On 2022 patient presented back to hospital in [place] due to worsening foot wound and noted drainage.  He underwent left foot debridement and I&D on 2022.  He again underwent left foot debridement and I&D but required no amputation on 2022.  PICC line was placed on 10/3/2022.  Patient was to receive daptomycin however due to expense this was changed to rifampin and linezolid.  PICC line was removed.  At some point patient had transferred from nursing facility in [place] to [place] to be closer to family.  Staff had noticed today that the toe looked worse and appeared necrotic therefore he was sent to the ED for further evaluation.  Patient denies any known fever, chills, nausea, vomiting, shortness of breath or pain.  X-ray of his left foot showed soft tissue swelling of the first digit with an associated fracture involving the medial base of the first proximal phalanx with fracture fragment displaced by 6 mm.  Given soft tissue swelling appearance is concerning for  osteomyelitis with superimposed fracture.  MRI of left foot is pending.  Patient will be admitted to [facility] under the care of the Hospitalist for further evaluation and treatment.         Review of Systems   Constitutional: Negative for appetite change, chills, diaphoresis, fatigue, fever and unexpected weight change.   HENT: Negative.    Eyes: Negative for photophobia and visual disturbance.   Respiratory: Negative for cough and shortness of breath.    Cardiovascular: Negative for chest pain, palpitations and leg swelling.   Gastrointestinal: Negative for abdominal distention, abdominal pain, blood in stool, constipation, diarrhea, nausea and vomiting.   Genitourinary: Negative.    Musculoskeletal: Negative for neck pain and neck stiffness.   Skin: Positive for color change and wound.   Neurological: Negative.    Psychiatric/Behavioral: Negative.         All other systems reviewed and are negative.     Personal History     Past Medical History:   Diagnosis Date   • Diabetes mellitus (HCC)    • GERD (gastroesophageal reflux disease)              Past Surgical History:   Procedure Laterality Date   • EYE SURGERY         Family History:  family history includes Diabetes in his brother, brother, father, maternal grandfather, maternal grandmother, mother, and sister; Hypertension in his brother, brother, father, and mother. Otherwise pertinent FHx was reviewed and unremarkable.     Social History:  reports that he quit smoking about 5 years ago. His smoking use included cigarettes. He has a 40.00 pack-year smoking history. He has never used smokeless tobacco. He reports current drug use. Drug: Marijuana. He reports that he does not drink alcohol.  Social History     Social History Narrative    Caffeine 0-1 servings per day    Patient lives at home .       Medications:  SITagliptin, aspirin, glipiZIDE, lisinopril-hydrochlorothiazide, and metFORMIN    No Known Allergies    Objective    Objective     Vital Signs:   Temp:   [97.8 °F (36.6 °C)] 97.8 °F (36.6 °C)  Heart Rate:  [83-86] 86  Resp:  [16] 16  BP: (142-146)/(74-84) 142/84    Physical Exam  Vitals and nursing note reviewed.   Constitutional:       General: He is not in acute distress.     Appearance: Normal appearance. He is not ill-appearing, toxic-appearing or diaphoretic.   HENT:      Head: Normocephalic and atraumatic.      Nose: Nose normal.      Mouth/Throat:      Mouth: Mucous membranes are dry.      Pharynx: Oropharynx is clear.   Eyes:      Extraocular Movements: Extraocular movements intact.      Conjunctiva/sclera: Conjunctivae normal.      Pupils: Pupils are equal, round, and reactive to light.   Cardiovascular:      Rate and Rhythm: Normal rate and regular rhythm.      Heart sounds: Normal heart sounds.      Comments: Unable to palpate left foot dorsalis pedis pulse.  With doppler is faint   Pulmonary:      Effort: Pulmonary effort is normal.      Breath sounds: Normal breath sounds.   Abdominal:      General: Bowel sounds are normal. There is no distension.      Palpations: Abdomen is soft. There is no mass.      Tenderness: There is no abdominal tenderness. There is no right CVA tenderness, left CVA tenderness, guarding or rebound.      Hernia: No hernia is present.   Musculoskeletal:         General: No swelling, tenderness, deformity or signs of injury. Normal range of motion.      Cervical back: Normal range of motion and neck supple.      Right lower leg: No edema.      Left lower leg: Edema present.   Skin:     Findings: Erythema present.      Comments: Left great toe necrotic with noted sutures .  Mild erythema noted .  Non tender    Neurological:      Mental Status: He is alert. Mental status is at baseline.      Comments: Oriented to person only    Psychiatric:         Mood and Affect: Mood normal.         Behavior: Behavior normal.          Results Reviewed:  I have personally reviewed most recent indicated data and agree with findings including:  [x]   Laboratory  [x]  Radiology  [x]  EKG/Telemetry  []  Pathology  []  Cardiac/Vascular Studies  []  Old records  []  Other:  Most pertinent findings include:      LAB RESULTS:      Lab 10/14/22  1945   WBC 7.17   HEMOGLOBIN 9.2*   HEMATOCRIT 29.4*   PLATELETS 538*   NEUTROS ABS 4.98   IMMATURE GRANS (ABS) 0.03   LYMPHS ABS 1.46   MONOS ABS 0.47   EOS ABS 0.20   MCV 91.0   SED RATE 67*   CRP 3.07*   PROCALCITONIN 0.05   LACTATE 2.3*   PROTIME 13.3         Lab 10/14/22  1945   SODIUM 139   POTASSIUM 4.9   CHLORIDE 100   CO2 28.0   ANION GAP 11.0   BUN 17   CREATININE 1.21   EGFR 66.4   GLUCOSE 185*   CALCIUM 9.4         Lab 10/14/22  1945   TOTAL PROTEIN 8.4   ALBUMIN 3.60   GLOBULIN 4.8   ALT (SGPT) 17   AST (SGOT) 15   BILIRUBIN <0.2   ALK PHOS 115         Lab 10/14/22  1945   PROTIME 13.3   INR 1.02                 Brief Urine Lab Results  (Last result in the past 365 days)      Color   Clarity   Blood   Leuk Est   Nitrite   Protein   CREAT   Urine HCG        06/12/22 2056 Yellow   Clear     Negative                   Microbiology Results (last 10 days)     ** No results found for the last 240 hours. **          XR Foot 3+ View Left    Result Date: 10/14/2022  DATE OF EXAM: 10/14/2022 8:31 PM  PROCEDURE: XR FOOT 3+ VW LEFT-  INDICATIONS: pain, swelling  COMPARISON: No comparisons available.  TECHNIQUE: A minimum of three routine standard radiographic views were obtained of the left foot.  FINDINGS: There is diffuse soft tissue swelling of the first digit. There is an associated area of osseous erosion with fracture involving the medial aspect of the base of the first proximal phalanx. The fracture fragment is displaced medially by 6 mm. The remaining osseous structures are intact. Bones of the midfoot are maintained in alignment.      Impression: Soft tissue swelling of the first digit with an associated fracture involving the medial base of the first proximal phalanx with fracture fragment displaced by 6 mm. Given  soft tissue swelling appearance concerning for osteomyelitis with superimposed fracture, correlate with associated clinical findings.  This report was finalized on 10/14/2022 9:14 PM by Marco Huitron MD.            Assessment & Plan   Assessment & Plan       Acute osteomyelitis of left foot (HCC)    Type 2 diabetes mellitus (HCC)    Essential hypertension    Psychotic disorder (HCC)      67 year old male presents to the ED due to worsening left foot wound     1) Acute osteomyelitis of left foot  -ortho to see in am   -xray mentioned above  -MRI of left foot pending   -continue cefepime and vancomycin   -BC x2 pending   -wound cultures  -consult ID in am   -type and screen  -check coags     2) Diabetes mellitus type 2  -check hgb A1c   -start ldssi   -fingersticks achs     3) Essential hypertension   -on lisinopril  -BP stable     4) Unspecified psychotic disorder   -per records from [facility] on seroquel and olanzapine  -will verify with family/pharmacy in am if still taking     DVT prophylaxis:  scds to right lower extremity only     CODE STATUS:  Full Code        This note has been completed as part of a split-shared workflow.     Signature: Electronically signed by NARGIS Valdez, 10/14/22, 10:25 PM EDT.      Attending   Admission Attestation       I have performed an independent face-to-face diagnostic evaluation including performing an independent physical examination as documented here.  The documented plan of care above was reviewed and developed with the advanced practice clinician (APC).      Brief Summary Statement:   Omkar Nava is a 67 y.o. male who came to the ED tonight due to worsening left foot wound.  The daughter accompanies the patient in the ED room during my visit today and assist in full with history of the patient is not a reliable historian; she states he has confusion at baseline.  The patient suffered a fracture in mid September and was admitted to a facility in [place]  and eventually had surgery; his sutures reportedly became infected and he was treated with IV antibiotics.  He was brought back to a nursing facility here in [place] for further care and when his dressing was taken down today (Friday) it was felt that his left foot wound had worsened and become more necrotic, so he was sent to the ED.  Daughter states that she feels he has been more confused over the last week.  He denies chest pain, shortness of breath, fever/chills, nausea/emesis, slurred speech/facial droop, or syncope.    Remainder of detailed HPI is as noted by APC and has been reviewed and/or edited by me for completeness.    Attending Physical Exam:  Temp:  [97.8 °F (36.6 °C)-98 °F (36.7 °C)] 98 °F (36.7 °C)  Heart Rate:  [62-86] 62  Resp:  [16-18] 18  BP: (142-169)/(74-84) 169/83    Constitutional: Awake, alert, NAD.  Eyes: PERRLA, sclerae anicteric, no conjunctival injection  HENT: NCAT, mucous membranes dry.  Neck: Supple, no thyromegaly, no lymphadenopathy, trachea midline  Respiratory: Clear to auscultation bilaterally, nonlabored respirations   Cardiovascular: RRR, no murmurs, rubs, or gallops, very faint left dorsalis pedis pulse  Gastrointestinal: Positive bowel sounds, soft, nontender, nondistended  Musculoskeletal: No RLE edema, left foot with inflammation secondary to wound, no clubbing or cyanosis to extremities  Psychiatric: Appropriate affect, cooperative  Neurologic: Oriented to person only, daughter states this is baseline and is chronic/unchanged, Cranial Nerves grossly intact to confrontation, speech quiet but clear  Skin: No rashes, poor turgor.  Left foot has large necrotic area on the medial and plantar aspect of the left toe and medially into the left foot.  Mild surrounding cellulitic change.  No pain on palpation.  No drainage.    Brief Assessment/Plan :  See detailed assessment and plan developed with APC which I have reviewed and/or edited for completeness.        Admission Status:  I believe that this patient meets inpatient criteria due to need for IV antibiotics, and will need consultation with the infectious disease service and orthopedic surgery.  For these reasons I feel his care will extend over 2 midnights.      Scottie Miranda III, DO  10/15/22       Electronically signed by Scottie Miranda III, DO at 10/15/22 0300          Efra Cristobal PT   Physical Therapist  Physical Therapy  Therapy Treatment Note      Signed  Date of Service:  10/26/22 1503  Creation Time:  10/26/22 154     Signed        Expand All Collapse All  Patient Name: Omkar Nava                     : 1955                        MRN: [id number]                              Today's Date: 10/26/2022                                 Admit Date: 10/14/2022                      Visit Dx:   Visit Diagnosis       ICD-10-CM ICD-9-CM   1. Osteomyelitis of toe of left foot (HCC)  M86.9 730.27   2. Anemia, unspecified type  D64.9 285.9   3. Hyperglycemia  R73.9 790.29   4. 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, without long-term current use of insulin   • Orthostatic hypotension   • Acute osteomyelitis of left foot (HCC)   • Type 2 diabetes mellitus (HCC)   • Essential hypertension   • Psychotic disorder (HCC)         Medical History        Past Medical History:   Diagnosis Date   • Diabetes mellitus (HCC)     • GERD (gastroesophageal reflux disease)           Surgical History         Past Surgical History:   Procedure Laterality Date   • AMPUTATION FOOT Left 10/18/2022     Procedure: PARTIAL FIRST RAY AMPUTATION LEFT;  Surgeon: Eduardo Lieberman MD;  Location: [facility];  Service: Orthopedics;  Laterality: Left;   • EYE SURGERY                      General  Information      Row Name 10/26/22 1535                   Physical Therapy Time and Intention      Document Type therapy note (daily note)  -SC        Mode of Treatment physical therapy  -SC               Row Name 10/26/22 1535                   General Information      Patient Profile Reviewed yes  -SC        Existing Precautions/Restrictions fall;non-weight bearing;left;other (see comments)  nwb,vision loss, dementia  -SC        Barriers to Rehab visual deficit;cognitive status  -SC               Row Name 10/26/22 1535                   Cognition      Orientation Status (Cognition) oriented to;person;place;disoriented to;time  -SC               Row Name 10/26/22 1535                   Safety Issues, Functional Mobility      Impairments Affecting Function (Mobility) balance;cognition;coordination;endurance/activity tolerance;motor control;postural/trunk control;strength  -SC        Cognitive Impairments, Mobility Safety/Performance insight into deficits/self-awareness;judgment;problem-solving/reasoning  -SC        Comment, Safety Issues/Impairments (Mobility) alert, following commands with extra time to respond  -SC                           User Key  (r) = Recorded By, (t) = Taken By, (c) = Cosigned By             Initials Name Provider Type      SC Efra Cristobal PT Physical Therapist                              Mobility             Row Name 10/26/22 1536                   Bed Mobility      Bed Mobility sit-supine;scooting/bridging;supine-sit  -SC        Scooting/Bridging [place] (Bed Mobility) minimum assist (75% patient effort);1 person assist;verbal cues  -SC        Supine-Sit [place] (Bed Mobility) 1 person assist;moderate assist (50% patient effort);verbal cues  -SC        Sit-Supine [place] (Bed Mobility) maximum assist (25% patient effort);2 person assist;verbal cues  -SC        Assistive Device (Bed Mobility) bed rails;draw sheet;head of bed elevated  -SC        Comment, (Bed Mobility)  verbal, tactile cuesf or sequencing bed mobility. Extra time neede to scoot forward to edg of bed. Assistance needed to get back to bed and scoot up to head of bed  -SC               Row Name 10/26/22 1536                   Transfers      Comment, (Transfers) Worked on STS x2 from edge of bed using ARJO wx. Patient able to get to full standing with the assistance of elevated bed and arjo. He sustained for 15 sec with difficulty keeping 100% wt off L leg. Encouraged to wt shif over to R leg.  -SC               Row Name 10/26/22 1536                   Sit-Stand Transfer      Sit-Stand [place] (Transfers) 2 person assist;maximum assist (25% patient effort)  -SC        Assistive Device (Sit-Stand Transfers) other (see comments)  arjo  -SC               Row Name 10/26/22 1536                   Gait/Stairs (Locomotion)      Comment, (Gait/Stairs) no appropriate  -SC               Row Name 10/26/22 1536                   Mobility      Extremity Weight-bearing Status left lower extremity  -SC        Left Lower Extremity (Weight-bearing Status) non weight-bearing (NWB)  -SC                           User Key  (r) = Recorded By, (t) = Taken By, (c) = Cosigned By             Initials Name Provider Type      SC Efra Cristobal, PT Physical Therapist                              Obj/Interventions             Row Name 10/26/22 1540                   Motor Skills      Therapeutic Exercise hip;knee;ankle  -[facility] Name 10/26/22 1540                   Hip (Therapeutic Exercise)      Hip (Therapeutic Exercise) AAROM (active assistive range of motion)  -SC        Hip AROM (Therapeutic Exercise) right;flexion;extension;aBduction;aDduction;10 repetitions;supine  -SC               Row Name 10/26/22 1540                   Knee (Therapeutic Exercise)      Knee (Therapeutic Exercise) strengthening exercise  -SC        Knee Strengthening (Therapeutic Exercise) bilateral;extension;LAQ (long arc quad);flexion;10  repetitions;sitting  -SC               Row Name 10/26/22 1540                   Balance      Balance Assessment sitting dynamic balance;sitting static balance  -SC        Static Sitting Balance verbal cues;standby assist  -SC        Dynamic Sitting Balance minimal assist;verbal cues  -SC        Comment, Balance Patient leans to R ifht on sitting verbal/tactile cues to find midline  -SC                           User Key  (r) = Recorded By, (t) = Taken By, (c) = Cosigned By             Initials Name Provider Type      SC Efra Cristobal, PT Physical Therapist                       Goals/Plan    No documentation.                            Clinical Impression      Row Name 10/26/22 1541                   Pain      Pretreatment Pain Rating 0/10 - no pain  -SC               Row Name 10/26/22 1541                   Plan of Care Review      Plan of Care Reviewed With patient  -SC        Progress improving  -SC        Outcome Evaluation Patient more alert and oriented today. He is able to follow simple commands and participate in therapeutic exercise. He continues to have limited mobility due to weakness, low vision and cognitive status.  -SC               Row Name 10/26/22 1541                   Therapy Assessment/Plan (PT)      Rehab Potential (PT) good, to achieve stated therapy goals  -SC        Criteria for Skilled Interventions Met (PT) yes;meets criteria  -SC        Therapy Frequency (PT) 3 times/wk  -SC               Row Name 10/26/22 1541                   Positioning and Restraints      Pre-Treatment Position in bed  -SC        Post Treatment Position bed  -SC        In Bed notified nsg;supine;encouraged to call for assist;call light within reach;exit alarm on;patient within staff view;with family/caregiver  -SC                           User Key  (r) = Recorded By, (t) = Taken By, (c) = Cosigned By             Initials Name Provider Type      SC Efra Cristobal, PT Physical Therapist                        Outcome Measures             Row Name 10/26/22 1543                   How much help from another person do you currently need...      Turning from your back to your side while in flat bed without using bedrails? 3  -SC        Moving from lying on back to sitting on the side of a flat bed without bedrails? 2  -SC        Moving to and from a bed to a chair (including a wheelchair)? 1  -SC        Standing up from a chair using your arms (e.g., wheelchair, bedside chair)? 2  -SC        Climbing 3-5 steps with a railing? 1  -SC        To walk in hospital room? 1  -SC        AM-PAC 6 Clicks Score (PT) 10  -SC        Highest level of mobility 4 --> Transferred to chair/commode  -SC               Row Name 10/26/22 1543                   Functional Assessment      Outcome Measure Options AM-PAC 6 Clicks Basic Mobility (PT)  -SC                           User Key  (r) = Recorded By, (t) = Taken By, (c) = Cosigned By             Initials Name Provider Type      SC Efra Cristobal PT Physical Therapist                     Physical Therapy Education              Title: PT OT SLP Therapies (Done)                Topic: Physical Therapy (Done)                Point: Mobility training (Done)                Learning Progress Summary                 Patient Eager, E, VU by SC at 10/26/2022 1543     Comment: reviewed HEp     Acceptance, E, VU by  at 10/25/2022 2209     Acceptance, E, NR by SC at 10/25/2022 1633     Comment: reviewed benefits of sitting up     Acceptance, E, VU by  at 10/22/2022 2204     Acceptance, E, NL by SC at 10/19/2022 1512     Comment: reviewed benefits of actiity                              Point: Home exercise program (Done)                Learning Progress Summary                 Patient Eager, E, VU by SC at 10/26/2022 1543     Comment: reviewed HEp     Acceptance, E, VU by  at 10/25/2022 2209     Acceptance, E, NR by SC at 10/25/2022 1633     Comment: reviewed benefits of sitting up     Acceptance, E,  VU by  at 10/22/2022 2204     Acceptance, E, NL by SC at 10/19/2022 1512     Comment: reviewed benefits of actiity                                   Point: Body mechanics (Done)                Learning Progress Summary                 Patient Eager, E, VU by SC at 10/26/2022 1543     Comment: reviewed HEp     Acceptance, E, VU by  at 10/25/2022 2209     Acceptance, E, NR by SC at 10/25/2022 1633     Comment: reviewed benefits of sitting up     Acceptance, E, VU by  at 10/22/2022 2204     Acceptance, E, NL by SC at 10/19/2022 1512     Comment: reviewed benefits of actiity                                   Point: Precautions (Done)                Learning Progress Summary                 Patient Eager, E, VU by SC at 10/26/2022 1543     Comment: reviewed HEp     Acceptance, E, VU by  at 10/25/2022 2209     Acceptance, E, NR by SC at 10/25/2022 1633     Comment: reviewed benefits of sitting up     Acceptance, E, VU by  at 10/22/2022 2204     Acceptance, E, NL by SC at 10/19/2022 1512     Comment: reviewed benefits of actiity                                                 User Key      Initials Effective Dates Name Provider Type Discipline      SC 06/16/21 -  Efra Cristobal PT Physical Therapist PT       09/22/22 -  Augusta Singleton RN Registered Nurse Nurse                     PT Recommendation and Plan  Plan of Care Reviewed With: patient  Progress: improving  Outcome Evaluation: Patient more alert and oriented today. He is able to follow simple commands and participate in therapeutic exercise. He continues to have limited mobility due to weakness, low vision and cognitive status.      Time Calculation:             PT Charges              Row Name 10/26/22 7279                         Time Calculation      Start Time 1507  -SC          PT Received On 10/26/22  -SC          PT Goal Re-Cert Due Date 10/29/22  -SC                   Time Calculation- PT      Total Timed Code Minutes- PT 20 minute(s)  -SC                    Timed Charges      94308 - PT Therapeutic Exercise Minutes 10  -SC          45897 - PT Therapeutic Activity Minutes 10  -SC                   Total Minutes      Timed Charges Total Minutes 20  -SC           Total Minutes 20  -SC                          User Key  (r) = Recorded By, (t) = Taken By, (c) = Cosigned By             Initials Name Provider Type      SC Efra Cristobal PT Physical Therapist                            Therapy Charges for Today      Code Description Service Date Service Provider Modifiers Qty     18474115284 HC PT THERAPEUTIC ACT EA 15 MIN 10/25/2022 Efra Cristobal, PT GP 1     23281708056 HC PT THER SUPP EA 15 MIN 10/25/2022 Efra Cristobal, PT GP 1     72272104253 HC PT THER PROC EA 15 MIN 10/26/2022 Efra Cristobal, PT GP 1     93081820016 HC PT THER SUPP EA 15 MIN 10/26/2022 Efra Cristobal, PT GP 2             PT G-Codes  Outcome Measure Options: AM-PAC 6 Clicks Basic Mobility (PT)  AM-PAC 6 Clicks Score (PT): 10  AM-PAC 6 Clicks Score (OT): 10     Efra Cristobal PT              10/26/2022                                       Physical Therapy Notes (most recent note)      Efra Cristobal PT at 10/26/22 1507  Version 1 of 1       Goal Outcome Evaluation:  Plan of Care Reviewed With: patient        Progress: improving  Outcome Evaluation: Patient more alert and oriented today. He is able to follow simple commands and participate in therapeutic exercise. He continues to have limited mobility due to weakness, low vision and cognitive status.    Electronically signed by Efra Cristobal PT at 10/26/22 4411          Occupational Therapy Notes (most recent note)      Jenniffer Lee, OT at 10/24/22 1340          Patient Name: Omkar Nava  : 1955    MRN: [id number]                              Today's Date: 10/24/2022       Admit Date: 10/14/2022    Visit Dx:     ICD-10-CM ICD-9-CM   1. Osteomyelitis of toe of left foot (HCC)  M86.9 730.27   2. Anemia, unspecified  type  D64.9 285.9   3. Hyperglycemia  R73.9 790.29   4. 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, without long-term current use of insulin   • Orthostatic hypotension   • Acute osteomyelitis of left foot (HCC)   • Type 2 diabetes mellitus (HCC)   • Essential hypertension   • Psychotic disorder (HCC)     Past Medical History:   Diagnosis Date   • Diabetes mellitus (HCC)    • GERD (gastroesophageal reflux disease)      Past Surgical History:   Procedure Laterality Date   • AMPUTATION FOOT Left 10/18/2022    Procedure: PARTIAL FIRST RAY AMPUTATION LEFT;  Surgeon: Eduardo Lieberman MD;  Location: [facility];  Service: Orthopedics;  Laterality: Left;   • EYE SURGERY        General Information     Row Name 10/24/22 1419          OT Time and Intention    Document Type therapy note (daily note)  -JY     Mode of Treatment occupational therapy  -JY     Row Name 10/24/22 1419          General Information    Patient Profile Reviewed yes  -JY     Existing Precautions/Restrictions fall;non-weight bearing;left;other (see comments)  LLE NWB in splint, vision deficits at baseline (reportedly only sees shadows), dementia at baseline  -JY     Barriers to Rehab medically complex;previous functional deficit;visual deficit  -JY     Row Name 10/24/22 1419          Cognition    Orientation Status (Cognition) oriented to;person;disoriented to;time;place;other (see comments)  stated number 22 for year, stated incorrect month  -JY     Row Name 10/24/22 1419          Safety Issues, Functional Mobility    Safety Issues Affecting Function (Mobility) ability to follow commands;awareness of need for assistance;at risk behavior observed;insight into deficits/self-awareness;judgment;problem-solving;safety precaution awareness;safety  precautions follow-through/compliance;sequencing abilities  -JY     Impairments Affecting Function (Mobility) balance;cognition;coordination;endurance/activity tolerance;motor control;postural/trunk control;strength  -JY     Cognitive Impairments, Mobility Safety/Performance awareness, need for assistance;insight into deficits/self-awareness;judgment;problem-solving/reasoning;safety precaution awareness;safety precaution follow-through;sequencing abilities  -JY     Comment, Safety Issues/Impairments (Mobility) pt alert however not consistently responding to cues, prompts, instruction; attempting to adhere to NWB LLE w/ cues however when fatigued, safety concerns exist  -JY           User Key  (r) = Recorded By, (t) = Taken By, (c) = Cosigned By    Initials Name Provider Type    Jenniffer De Jesus OT Occupational Therapist                 Mobility/ADL's     Row Name 10/24/22 7639          Bed Mobility    Bed Mobility supine-sit;scooting/bridging;rolling right  -JY     Rolling Right [place] (Bed Mobility) maximum assist (25% patient effort);verbal cues  -JY     Scooting/Bridging [place] (Bed Mobility) dependent (less than 25% patient effort);2 person assist;verbal cues  -JY     Supine-Sit [place] (Bed Mobility) maximum assist (25% patient effort);2 person assist;verbal cues  -JY     Bed Mobility, Safety Issues cognitive deficits limit understanding;decreased use of arms for pushing/pulling;decreased use of legs for bridging/pushing  -JY     Assistive Device (Bed Mobility) bed rails;draw sheet;head of bed elevated  -JY     Comment, (Bed Mobility) skilled cues to advance LEs to EOB and upright trunk into sitting, req'd gross max A x 2 for UB and LB mgmt lyndsey to move LLE and come to sitting, assisted some in moving RLE and bearing weight through UEs to advance hips forward however ultimately req'd draw sheet A to improve symmetry at EOB  -JY     Row Name 10/24/22 1422          Transfers    Transfers  sit-stand transfer;stand-sit transfer  -     Comment, (Transfers) skilled cues for optimal hand placement for controlled ascend, descend with BUE support, counted for momentum in prep to stand, 1st trial max A x 2, 2nd trial dep A x 2 and increased weight bearing through LLE thus returned to sitting then supine; pt not able to clear buttocks from bed and concern for safety with WB as pt fatigued  -     Row Name 10/24/22 1422          Sit-Stand Transfer    Sit-Stand [place] (Transfers) maximum assist (25% patient effort);dependent (less than 25% patient effort);2 person assist;verbal cues  -JY     Assistive Device (Sit-Stand Transfers) other (see comments)  BUE support  -     Row Name 10/24/22 1422          Stand-Sit Transfer    Stand-Sit [place] (Transfers) maximum assist (25% patient effort);dependent (less than 25% patient effort);verbal cues  -JY     Assistive Device (Stand-Sit Transfers) other (see comments)  BUE support  -     Row Name 10/24/22 1422          Functional Mobility    Functional Mobility- Comment defer to PT for specifics  -     Row Name 10/24/22 1422          Activities of Daily Living    BADL Assessment/Intervention upper body dressing;grooming;toileting;lower body dressing  -     Row Name 10/24/22 1422          Mobility    Extremity Weight-bearing Status left lower extremity  -     Left Lower Extremity (Weight-bearing Status) non weight-bearing (NWB)   -     Row Name 10/24/22 1422          Upper Body Dressing Assessment/Training    [place] Level (Upper Body Dressing) doff;don;pajama/robe;maximum assist (25% patient effort);verbal cues;nonverbal cues (demo/gesture)  -     Position (Upper Body Dressing) sitting up in bed  -     Row Name 10/24/22 1422          Lower Body Dressing Assessment/Training    [place] Level (Lower Body Dressing) don;doff;socks;dependent (less than 25% patient effort)  -J     Position (Lower Body Dressing) supine  -     Comment,  (Lower Body Dressing) skilled cues for sequencing for threading and unthreading with attention to opposite UE use to aid in mgmt upward and downward  -JY     Row Name 10/24/22 1422          Grooming Assessment/Training    [place] Level (Grooming) wash face, hands;maximum assist (25% patient effort);dependent (less than 25% patient effort);verbal cues;nonverbal cues (demo/gesture)  -JY     Position (Grooming) sitting up in bed  -JY     Comment, (Grooming) provided washcloth and initiated awareness to task with verbal cues and [place] A to begin washing, pt did not continue with task and req'd dep care  -JY     Row Name 10/24/22 1422          Toileting Assessment/Training    [place] Level (Toileting) change pad/brief;perform perineal hygiene;dependent (less than 25% patient effort);verbal cues  -JY     Assistive Devices (Toileting) other (see comments)  external catheter  -JY     Position (Toileting) sitting up in bed  -JY     Comment, (Toileting) pt dependent for external catheter mgmt to improve set up for voiding given pt underlying impairments and limited skill set that limt pt in toileting outside of bed  -JY           User Key  (r) = Recorded By, (t) = Taken By, (c) = Cosigned By    Initials Name Provider Type    Jenniffer De Jesus OT Occupational Therapist               Obj/Interventions     Row Name 10/24/22 1430          Motor Skills    Motor Skills functional endurance  -JY     Functional Endurance easily fatigued with any dynamic activity, req'd more A from max A x 2 to dep A x 2 from one trial to second STS at EOB d/t fatigue  -JY     Row Name 10/24/22 1430          Balance    Balance Assessment sitting static balance;sitting dynamic balance;standing static balance  -JY     Static Sitting Balance minimal assist;verbal cues  -JY     Dynamic Sitting Balance minimal assist;verbal cues  -JY     Position, Sitting Balance supported;sitting edge of bed  -JY     Static Standing Balance maximum  assist;2-person assist;verbal cues  -JY     Position/Device Used, Standing Balance supported;other (see comments)  BUE support  -JY     Balance Interventions sitting;static;dynamic;sit to stand;supported  -JY     Comment, Balance sat at EOB initially with more A, improved to sit with BUE support at EOB, balance impacted with pt fatigue which occurs quickly  -JY           User Key  (r) = Recorded By, (t) = Taken By, (c) = Cosigned By    Initials Name Provider Type    Jenniffer De Jesus, OT Occupational Therapist               Goals/Plan    No documentation.                Clinical Impression     Row Name 10/24/22 1433          Pain Assessment    Additional Documentation Pain Scale: FACES Pre/Post-Treatment (Group)  -SANDRA     Row Name 10/24/22 1433          Pain Scale: FACES Pre/Post-Treatment    Pain: FACES Scale, Pretreatment 0-->no hurt  -JY     Posttreatment Pain Rating 0-->no hurt  -JY     Pre/Posttreatment Pain Comment pt denies pain verbally and did not present w/ any non verbal indicators  -JSARAH     Row Name 10/24/22 1438          Plan of Care Review    Plan of Care Reviewed With patient  -SANDRA     Progress improving  -JY     Outcome Evaluation Pt more alert this date, however fatigued easily with OT interventions. Returned to supine d/t fatigue and pt safety per RN request. Req'd gross max A x 2 for supine > sitting, scooting to EOB with attempt to move RLE and initiate uprighting trunk, need for A for mgmt of LLE w/precautions maintained and to fully sit up and scoot toward EOB. Pt demonstrated intermittent epiodes of improved balance with BUE support. Pt completed d/d gown with max A w/ cues for mgmt and seq A using opposite UE to assist, CRESPO and max A for hand washing. Pt stood partially (buttocks did not clear bed) with max A x 2 and then dep A x 2 as pt fatigued for purposes of improved linen mgmt. Pt grossly dep for toileting external catheter mgmt. Will progress pt as able while hospitalized. Continue to  recommend SNF at d/c.  -JY     Row Name 10/24/22 1433          Therapy Assessment/Plan (OT)    Rehab Potential (OT) fair, will monitor progress closely  -JY     Criteria for Skilled Therapeutic Interventions Met (OT) yes;skilled treatment is necessary  -JY     Therapy Frequency (OT) daily  -JY     Row Name 10/24/22 1433          Therapy Plan Review/Discharge Plan (OT)    Anticipated Discharge Disposition (OT) skilled nursing facility  -JY     Row Name 10/24/22 1433          Vital Signs    Pre Systolic BP Rehab 138  -JY     Pre Treatment Diastolic BP 56  -JY     Pretreatment Heart Rate (beats/min) 85  -JY     Pre SpO2 (%) 94  -JY     O2 Delivery Pre Treatment room air  -JY     O2 Delivery Intra Treatment room air  -JY     Post SpO2 (%) 95  -JY     O2 Delivery Post Treatment room air  -JY     Pre Patient Position Supine  -JY     Intra Patient Position Standing  -JY     Post Patient Position Supine  -JY     Row Name 10/24/22 1433          Positioning and Restraints    Pre-Treatment Position in bed  -JY     Post Treatment Position bed  -JY     In Bed notified nsg;fowlers;call light within reach;exit alarm on;side rails up x3;LLE elevated  -JY           User Key  (r) = Recorded By, (t) = Taken By, (c) = Cosigned By    Initials Name Provider Type    Jenniffer De Jesus, SHIRAZ Occupational Therapist               Outcome Measures     Row Name 10/24/22 1441          How much help from another is currently needed...    Putting on and taking off regular lower body clothing? 1  -JY     Bathing (including washing, rinsing, and drying) 1  -JY     Toileting (which includes using toilet bed pan or urinal) 1  -JY     Putting on and taking off regular upper body clothing 2  -JY     Taking care of personal grooming (such as brushing teeth) 2  -JY     Eating meals 3  -JY     AM-PAC 6 Clicks Score (OT) 10  -JY     Row Name 10/24/22 1441          Functional Assessment    Outcome Measure Options AM-PAC 6 Clicks Daily Activity (OT)  -JY            User Key  (r) = Recorded By, (t) = Taken By, (c) = Cosigned By    Initials Name Provider Type    Jenniffer De Jesus OT Occupational Therapist                Occupational Therapy Education     Title: PT OT SLP Therapies (In Progress)     Topic: Occupational Therapy (In Progress)     Point: ADL training (In Progress)     Description:   Instruct learner(s) on proper safety adaptation and remediation techniques during self care or transfers.   Instruct in proper use of assistive devices.              Learning Progress Summary           Patient Acceptance, E,D, NR by SANDRA at 10/24/2022 1340    Acceptance, E, VU by PEG at 10/22/2022 2204    Acceptance, E,D, NR by SANDRA at 10/19/2022 1445                   Point: Home exercise program (Done)     Description:   Instruct learner(s) on appropriate technique for monitoring, assisting and/or progressing therapeutic exercises/activities.              Learning Progress Summary           Patient Acceptance, E, VU by PEG at 10/22/2022 2204                   Point: Precautions (In Progress)     Description:   Instruct learner(s) on prescribed precautions during self-care and functional transfers.              Learning Progress Summary           Patient Acceptance, E,D, NR by SANDRA at 10/24/2022 1340    Acceptance, E, VU by PEG at 10/22/2022 2204    Acceptance, E,D, NR by SANDRA at 10/19/2022 1445                   Point: Body mechanics (In Progress)     Description:   Instruct learner(s) on proper positioning and spine alignment during self-care, functional mobility activities and/or exercises.              Learning Progress Summary           Patient Acceptance, E,D, NR by SANDRA at 10/24/2022 1340    Acceptance, E, VU by PEG at 10/22/2022 2204    Acceptance, E,D, NR by SANDRA at 10/19/2022 1445                               User Key     Initials Effective Dates Name Provider Type Discipline    SANDRA 06/16/21 -  Jenniffer Lee OT Occupational Therapist OT     09/22/22 -  Augusta Singleton RN Registered  Nurse Nurse              OT Recommendation and Plan  Therapy Frequency (OT): daily  Plan of Care Review  Plan of Care Reviewed With: patient  Progress: improving  Outcome Evaluation: Pt more alert this date, however fatigued easily with OT interventions. Returned to supine d/t fatigue and pt safety per RN request. Req'd gross max A x 2 for supine > sitting, scooting to EOB with attempt to move RLE and initiate uprighting trunk, need for A for mgmt of LLE w/precautions maintained and to fully sit up and scoot toward EOB. Pt demonstrated intermittent epiodes of improved balance with BUE support. Pt completed d/d gown with max A w/ cues for mgmt and seq A using opposite UE to assist, CRESPO and max A for hand washing. Pt stood partially (buttocks did not clear bed) with max A x 2 and then dep A x 2 as pt fatigued for purposes of improved linen mgmt. Pt grossly dep for toileting external catheter mgmt. Will progress pt as able while hospitalized. Continue to recommend SNF at d/c.     Time Calculation:    Time Calculation- OT     Row Name 10/24/22 1442             Time Calculation- OT    OT Start Time 1340  -JY      OT Received On 10/24/22  -JY      OT Goal Re-Cert Due Date 10/29/22  -JY         Timed Charges    04111 - OT Therapeutic Activity Minutes 13  -JY      65047 - OT Self Care/Mgmt Minutes 10  -JY         Total Minutes    Timed Charges Total Minutes 23  -JY       Total Minutes 23  -JY            User Key  (r) = Recorded By, (t) = Taken By, (c) = Cosigned By    Initials Name Provider Type    Jenniffer De Jesus OT Occupational Therapist              Therapy Charges for Today     Code Description Service Date Service Provider Modifiers Qty    44290402399 HC OT THERAPEUTIC ACT EA 15 MIN 10/24/2022 Jenniffer Lee OT GO 1    79411171283 HC OT SELF CARE/MGMT/TRAIN EA 15 MIN 10/24/2022 Jenniffer Lee OT GO 1               Jenniffer Lee OT  10/24/2022    Electronically signed by Jenniffer Lee OT at 10/24/22 4923

## 2022-11-01 NOTE — PROGRESS NOTES
[facility]  PROGRESS NOTE    Patient Name: Omkar Nava  : 1955  MRN: [id number]    Date of Admission: 10/14/2022  Primary Care Physician: Provider, No Known    Subjective   Subjective     CC:  Left foot osteo     HPI:  No acute events. This morning, states he is doing ok. Calm and cooperative.     ROS:  Gen- No fevers, chills  CV- No chest pain, palpitations  Resp- No cough, dyspnea  GI- No N/V/D, abd pain     Objective   Objective     Vital Signs:   Temp:  [98 °F (36.7 °C)-99.2 °F (37.3 °C)] 98.2 °F (36.8 °C)  Heart Rate:  [61-74] 61  Resp:  [18] 18  BP: (120-129)/(61-87) 125/61  Flow (L/min):  [2] 2     Physical Exam:  Constitutional: No acute distress, awake, alert  HENT: NCAT, mucous membranes moist  Respiratory: Clear to auscultation bilaterally, respiratory effort normal   Cardiovascular: RRR, no murmurs, rubs, or gallops  Gastrointestinal: Positive bowel sounds, soft, nontender, nondistended  Musculoskeletal: No bilateral ankle edema; left foot in splint  Psychiatric: flat affect, calm and cooperative  Neurologic: Oriented x 3, strength symmetric in all extremities, Cranial Nerves grossly intact to confrontation, speech clear  Skin: No rashes    Results Reviewed:  LAB RESULTS:      Lab 10/31/22  0535 10/26/22  0527   WBC 9.55 6.87   HEMOGLOBIN 7.6* 8.3*   HEMATOCRIT 24.5* 27.4*   PLATELETS 259 284   NEUTROS ABS 7.16* 4.26   IMMATURE GRANS (ABS) 0.04 0.02   LYMPHS ABS 1.45 1.54   MONOS ABS 0.72 0.69   EOS ABS 0.15 0.33   MCV 90.4 92.3   CRP  --  3.10*         Lab 10/31/22  0535 10/26/22  0527   SODIUM 132* 137   POTASSIUM 3.7 4.5   CHLORIDE 98 101   CO2 24.0 28.0   ANION GAP 10.0 8.0   BUN 19 18   CREATININE 1.07 0.87   EGFR 77.0 95.8   GLUCOSE 159* 323*   CALCIUM 8.6 8.6         Lab 10/31/22  0535 10/26/22  0527   TOTAL PROTEIN 6.8 6.5   ALBUMIN 2.90* 2.90*   GLOBULIN 3.9 3.6   ALT (SGPT) 16 22   AST (SGOT) 11 16   BILIRUBIN <0.2 <0.2   ALK PHOS 106 132*                      Brief Urine Lab Results  (Last result in the past 365 days)      Color   Clarity   Blood   Leuk Est   Nitrite   Protein   CREAT   Urine HCG        06/12/22 2056 Yellow   Clear     Negative                     Microbiology Results Abnormal     Procedure Component Value - Date/Time    COVID PRE-OP / PRE-PROCEDURE SCREENING ORDER (NO ISOLATION) - Swab, Nasopharynx [[id number]]  (Normal) Collected: 10/27/22 0510    Lab Status: Final result Specimen: Swab from Nasopharynx Updated: 10/27/22 0530    Narrative:      The following orders were created for panel order COVID PRE-OP / PRE-PROCEDURE SCREENING ORDER (NO ISOLATION) - Swab, Nasopharynx.  Procedure                               Abnormality         Status                     ---------                               -----------         ------                     COVID-19, ABBOTT IN-HOUS...[[id number]]  Normal              Final result                 Please view results for these tests on the individual orders.    COVID-19, ABBOTT IN-HOUSE,NASAL Swab (NO TRANSPORT MEDIA) 2 HR TAT - Swab, Nasopharynx [[id number]]  (Normal) Collected: 10/27/22 0510    Lab Status: Final result Specimen: Swab from Nasopharynx Updated: 10/27/22 0530     COVID19 Presumptive Negative    Narrative:      Fact sheet for providers: https://www.fda.gov/media/942916/download     Fact sheet for patients: https://www.fda.gov/media/343704/download    Test performed by PCR.  If inconsistent with clinical signs and symptoms patient should be tested with different authorized molecular test.    Fungus Culture - Tissue, Toe, Left [[id number]] Collected: 10/18/22 1608    Lab Status: Preliminary result Specimen: Tissue from Toe, Left Updated: 10/25/22 1815     Fungus Culture No fungus isolated at 1 week    AFB Culture - Tissue, Toe, Left [[id number]] Collected: 10/18/22 1608    Lab Status: Preliminary result Specimen: Tissue from Toe, Left Updated: 10/25/22 1815     AFB Culture No AFB isolated  at 1 week     AFB Stain No acid fast bacilli seen on concentrated smear    Fungus Culture - Tissue, Toe, Left [[id number]] Collected: 10/18/22 1552    Lab Status: Preliminary result Specimen: Tissue from Toe, Left Updated: 10/25/22 1800     Fungus Culture No fungus isolated at 1 week    AFB Culture - Tissue, Toe, Left [[id number]] Collected: 10/18/22 1552    Lab Status: Preliminary result Specimen: Tissue from Toe, Left Updated: 10/25/22 1800     AFB Culture No AFB isolated at 1 week     AFB Stain No acid fast bacilli seen on concentrated smear    Anaerobic Culture - Tissue, Toe, Left [[id number]]  (Normal) Collected: 10/18/22 1608    Lab Status: Final result Specimen: Tissue from Toe, Left Updated: 10/23/22 0543     Anaerobic Culture No anaerobes isolated at 5 days    Anaerobic Culture - Tissue, Toe, Left [[id number]]  (Normal) Collected: 10/18/22 1552    Lab Status: Final result Specimen: Tissue from Toe, Left Updated: 10/23/22 0543     Anaerobic Culture No anaerobes isolated at 5 days    Tissue / Bone Culture - Tissue, Toe, Left [[id number]] Collected: 10/18/22 1552    Lab Status: Final result Specimen: Tissue from Toe, Left Updated: 10/21/22 0840     Tissue Culture No growth at 3 days     Gram Stain Moderate (3+) WBCs seen      No organisms seen    Blood Culture - Blood, Hand, Left [[id number]]  (Normal) Collected: 10/14/22 2115    Lab Status: Final result Specimen: Blood from Hand, Left Updated: 10/19/22 2215     Blood Culture No growth at 5 days    Blood Culture - Blood, Arm, Left [[id number]]  (Normal) Collected: 10/14/22 2115    Lab Status: Final result Specimen: Blood from Arm, Left Updated: 10/19/22 2215     Blood Culture No growth at 5 days    MRSA Screen, PCR (Inpatient) - Swab, Nares [[id number]]  (Normal) Collected: 10/14/22 2345    Lab Status: Final result Specimen: Swab from Nares Updated: 10/15/22 0741     MRSA PCR Negative    Narrative:      The negative predictive value of this diagnostic test is high and  should only be used to consider de-escalating anti-MRSA therapy. A positive result may indicate colonization with MRSA and must be correlated clinically.  MRSA Negative          No radiology results from the last 24 hrs        I have reviewed the medications:  Scheduled Meds:amLODIPine, 10 mg, Oral, Q24H  cefTRIAXone, 2 g, Intravenous, Q24H  ferrous sulfate, 325 mg, Oral, Daily With Breakfast  haloperidol lactate, 5 mg, Intramuscular, Once  heparin (porcine), 5,000 Units, Subcutaneous, Q8H  insulin detemir, 10 Units, Subcutaneous, Nightly  insulin lispro, 0-7 Units, Subcutaneous, TID AC  Insulin Lispro, 8 Units, Subcutaneous, TID With Meals  lisinopril, 40 mg, Oral, Daily  OLANZapine, 5 mg, Oral, Daily   And  OLANZapine, 10 mg, Oral, Nightly  senna-docusate sodium, 2 tablet, Oral, Nightly  sodium chloride, 10 mL, Intravenous, Q12H  sodium chloride, 10 mL, Intravenous, Q12H  vancomycin, 750 mg, Intravenous, Q12H      Continuous Infusions:   PRN Meds:.•  acetaminophen **OR** acetaminophen  •  bisacodyl  •  dextrose  •  dextrose  •  glucagon (human recombinant)  •  LORazepam  •  magnesium sulfate **OR** magnesium sulfate in D5W 1g/100mL (PREMIX) **OR** magnesium sulfate  •  melatonin  •  [] HYDROmorphone **AND** naloxone  •  polyethylene glycol  •  Insert peripheral IV **AND** sodium chloride  •  sodium chloride  •  sodium chloride  •  ziprasidone    Assessment & Plan   Assessment & Plan     Active Hospital Problems    Diagnosis  POA   • **Acute osteomyelitis of left foot (HCC) [M86.172]  Yes   • Neurocognitive disorder [R41.9]  Unknown   • Type 2 diabetes mellitus (HCC) [E11.9]  Yes   • Essential hypertension [I10]  Yes   • Psychotic disorder (HCC) [F29]  Yes      Resolved Hospital Problems   No resolved problems to display.        Brief Hospital Course to date:  Omkar Nava is a 67 y.o. male with past medical history significant for diabetes mellitus type 2, essential hypertension and unspecified psychotic  "disorder presents to the ED due to worsening left foot wound. Per my partner's note,\" patient presented to [facility] on 9/17/2022 due to mechanical fall causing open articular fracture on the left foot. He was treated at that time with bedside flushing and repair by podiatry.  He was given Ancef and sent back to nursing facility on Keflex.  Wound cultures at that time grew MRSA; however, blood culture showed no growth.  On 9/27/2022 patient presented back to hospital in [place] due to worsening foot wound and noted drainage.  He underwent left foot debridement and I&D on 9/27/2022.  He again underwent left foot debridement and I&D but required no amputation on 9/30/2022.  PICC line was placed on 10/3/2022.  Patient was to receive daptomycin; however, due to expense this was changed to rifampin and linezolid.  PICC line was removed.  At some point, patient had transferred from nursing facility in [place] to [place] to be closer to family.\"     This patient's problems and plans were partially entered by my partner and updated as appropriate by me 11/01/22    Osteomyelitis of left foot/1st great toe (s/p amputation)  Raoultella Ornithinolytica growing from wound  - MRI shows wound around the great toe with geographic marrow signal alteration within the distal aspect of the first metatarsal as well as both phalanges of the great toe indicative of osteomyelitis  -s/p left great toe amputation on 10/18/22 by Dr. Petty  - NWB LLE. Elevate LLE. Keep splint clean and dry. SQ heparin for dvt prophylaxis while inpatient. Home on asa 325mg daily x 30 days  - Dressing changed 11/1; Continue f/u Dr. Petty as scheduled 11/8. Sutures remain in place  - PICC placed 10/27  - ID following (Dr. Gordon): rocephin & vanc tentatively through 11/25/22. Patient will need weekly labs, vanc trough, PICC dressing change. Follow up with Dr. Kaur in 2 weeks. Per ID: Pharmacy to adjust dose of vancomycin " based on weekly trough levels to try to maintain level between 12 and 18.       Anemia  Iron def  - completed 3 days iv iron on 10/20/22  - Overall stable; occult negative      DM II (a1c 7.0)  - continue levemir 10 units sq nightly; increase humalog to 8 unit tid meals. SSI   - Adjust PRN      HTN  - Increased to amlodipine 10 mg daily and lisinopril 40 mg daily with improvement   - BP controlled      Neurocognitive disorder   Dementia   -re-started olanzapine 10/15, was recently hospitalized at [facility] for psychiatric issues; apparently prn haldol was not much help  -olanzapine 5 mg qAM and 10 mg qHS  - Overall stable     Expected Discharge Location and Transportation: rehab   Expected Discharge Date: 11/5    DVT prophylaxis:  Medical and mechanical DVT prophylaxis orders are present.     AM-PAC 6 Clicks Score (PT): 14 (10/31/22 0800)    CODE STATUS:   Code Status and Medical Interventions:   Ordered at: 10/14/22 2230     Level Of Support Discussed With:    Patient     Code Status (Patient has no pulse and is not breathing):    CPR (Attempt to Resuscitate)     Medical Interventions (Patient has pulse or is breathing):    Full Support       Nicole Henriquez DO  11/01/22

## 2022-11-02 LAB
ANION GAP SERPL CALCULATED.3IONS-SCNC: 10 MMOL/L (ref 5–15)
BUN SERPL-MCNC: 22 MG/DL (ref 8–23)
BUN/CREAT SERPL: 22 (ref 7–25)
CALCIUM SPEC-SCNC: 9 MG/DL (ref 8.6–10.5)
CHLORIDE SERPL-SCNC: 101 MMOL/L (ref 98–107)
CO2 SERPL-SCNC: 24 MMOL/L (ref 22–29)
CREAT SERPL-MCNC: 1 MG/DL (ref 0.76–1.27)
DEPRECATED RDW RBC AUTO: 47.4 FL (ref 37–54)
EGFRCR SERPLBLD CKD-EPI 2021: 83.5 ML/MIN/1.73
ERYTHROCYTE [DISTWIDTH] IN BLOOD BY AUTOMATED COUNT: 14 % (ref 12.3–15.4)
GLUCOSE BLDC GLUCOMTR-MCNC: 186 MG/DL (ref 70–130)
GLUCOSE BLDC GLUCOMTR-MCNC: 210 MG/DL (ref 70–130)
GLUCOSE BLDC GLUCOMTR-MCNC: 288 MG/DL (ref 70–130)
GLUCOSE SERPL-MCNC: 174 MG/DL (ref 65–99)
HCT VFR BLD AUTO: 25.4 % (ref 37.5–51)
HGB BLD-MCNC: 7.7 G/DL (ref 13–17.7)
MCH RBC QN AUTO: 27.8 PG (ref 26.6–33)
MCHC RBC AUTO-ENTMCNC: 30.3 G/DL (ref 31.5–35.7)
MCV RBC AUTO: 91.7 FL (ref 79–97)
PLATELET # BLD AUTO: 296 10*3/MM3 (ref 140–450)
PMV BLD AUTO: 11.3 FL (ref 6–12)
POTASSIUM SERPL-SCNC: 4.1 MMOL/L (ref 3.5–5.2)
RBC # BLD AUTO: 2.77 10*6/MM3 (ref 4.14–5.8)
SODIUM SERPL-SCNC: 135 MMOL/L (ref 136–145)
WBC NRBC COR # BLD: 6.07 10*3/MM3 (ref 3.4–10.8)

## 2022-11-02 PROCEDURE — 99232 SBSQ HOSP IP/OBS MODERATE 35: CPT | Performed by: HOSPITALIST

## 2022-11-02 PROCEDURE — 63710000001 INSULIN DETEMIR PER 5 UNITS: Performed by: INTERNAL MEDICINE

## 2022-11-02 PROCEDURE — 80048 BASIC METABOLIC PNL TOTAL CA: CPT

## 2022-11-02 PROCEDURE — 63710000001 INSULIN LISPRO (HUMAN) PER 5 UNITS: Performed by: INTERNAL MEDICINE

## 2022-11-02 PROCEDURE — 97110 THERAPEUTIC EXERCISES: CPT

## 2022-11-02 PROCEDURE — 63710000001 INSULIN LISPRO (HUMAN) PER 5 UNITS: Performed by: ORTHOPAEDIC SURGERY

## 2022-11-02 PROCEDURE — 25010000002 HEPARIN (PORCINE) PER 1000 UNITS: Performed by: ORTHOPAEDIC SURGERY

## 2022-11-02 PROCEDURE — 25010000002 VANCOMYCIN PER 500 MG

## 2022-11-02 PROCEDURE — 97535 SELF CARE MNGMENT TRAINING: CPT

## 2022-11-02 PROCEDURE — 25010000002 CEFTRIAXONE PER 250 MG: Performed by: INTERNAL MEDICINE

## 2022-11-02 PROCEDURE — 85027 COMPLETE CBC AUTOMATED: CPT | Performed by: INTERNAL MEDICINE

## 2022-11-02 PROCEDURE — 82962 GLUCOSE BLOOD TEST: CPT

## 2022-11-02 RX ADMIN — HEPARIN SODIUM 5000 UNITS: 5000 INJECTION INTRAVENOUS; SUBCUTANEOUS at 05:35

## 2022-11-02 RX ADMIN — SODIUM CHLORIDE 2 G: 900 INJECTION INTRAVENOUS at 12:07

## 2022-11-02 RX ADMIN — INSULIN LISPRO 2 UNITS: 100 INJECTION, SOLUTION INTRAVENOUS; SUBCUTANEOUS at 17:25

## 2022-11-02 RX ADMIN — INSULIN LISPRO 8 UNITS: 100 INJECTION, SOLUTION INTRAVENOUS; SUBCUTANEOUS at 17:25

## 2022-11-02 RX ADMIN — Medication 10 ML: at 09:13

## 2022-11-02 RX ADMIN — SENNOSIDES AND DOCUSATE SODIUM 2 TABLET: 50; 8.6 TABLET ORAL at 20:19

## 2022-11-02 RX ADMIN — INSULIN DETEMIR 10 UNITS: 100 INJECTION, SOLUTION SUBCUTANEOUS at 20:18

## 2022-11-02 RX ADMIN — VANCOMYCIN HYDROCHLORIDE 750 MG: 750 INJECTION, SOLUTION INTRAVENOUS at 02:00

## 2022-11-02 RX ADMIN — HEPARIN SODIUM 5000 UNITS: 5000 INJECTION INTRAVENOUS; SUBCUTANEOUS at 20:18

## 2022-11-02 RX ADMIN — INSULIN LISPRO 8 UNITS: 100 INJECTION, SOLUTION INTRAVENOUS; SUBCUTANEOUS at 12:07

## 2022-11-02 RX ADMIN — VANCOMYCIN HYDROCHLORIDE 750 MG: 750 INJECTION, SOLUTION INTRAVENOUS at 14:51

## 2022-11-02 RX ADMIN — HEPARIN SODIUM 5000 UNITS: 5000 INJECTION INTRAVENOUS; SUBCUTANEOUS at 14:51

## 2022-11-02 RX ADMIN — INSULIN LISPRO 4 UNITS: 100 INJECTION, SOLUTION INTRAVENOUS; SUBCUTANEOUS at 12:06

## 2022-11-02 RX ADMIN — OLANZAPINE 10 MG: 5 TABLET, FILM COATED ORAL at 20:18

## 2022-11-02 NOTE — CASE MANAGEMENT/SOCIAL WORK
"Discharge Planning Assessment  [facility]     Patient Name: Omkar Nava  MRN: [id number]  Today's Date: 11/2/2022    Admit Date: 10/14/2022    Plan: Ongoing   Discharge Needs Assessment    No documentation.                Discharge Plan     Row Name 11/02/22 1411       Plan    Plan Ongoing    Patient/Family in Agreement with Plan yes    Plan Comments Spoke with multiple facilities today that are all \"looking\" at the referral for Mr. Nava. I refaxed some to different fax numbers and left a few messages. I did make a referral to [facility] with their admissions liaison, Teresa, and she will also look at this referral. Will await a bed offer and continue to make referrals. Case management will continue to follow.    Final Discharge Disposition Code 30 - still a patient              Continued Care and Services - Admitted Since 10/14/2022     Destination     Service Provider Request Status Selected Services Address Phone Fax Patient Preferred    [facility] Pending - Request Sent N/A [address] [facility] [address] [phone] --    [facility] Pending - Request Sent N/A [address] [facility] [address] [phone] --    [facility] Pending - Request Sent N/A [address] [facility] [address] [phone] --    [facility] - Request Sent N/A [address] [facility] [address] [facility] [id number] [phone] --    [facility] Pending - Request Sent N/A [address] [facility] [address] [phone] --    [facility] Pending - Request Sent N/A [address] [facility] [address] [phone] --    [facility] Pending - Request Sent N/A [address] [facility] [address] [phone] --    [facility] " Pending - Request Sent N/A [address] [facility] [id number] [phone] --    [facility] Pending - Request Sent N/A [address] [facility] [id number] [phone] --    [facility] Pending - No Request Sent N/A [address] [facility] [address] [phone] -- --    [facility] Declined  Facility declined (no reason given) N/A [address], [facility] [address] [phone] --    [facility] Declined  Behavior issues or concerns N/A [address], [place] [id number] [phone] --    [facility] Declined  Behavior issues or concerns N/A [address] [place] [address] [phone] --    [facility] Declined  Behavior issues or concerns N/A [address] [place] [address] [phone] --    [facility] Declined  Behavior issues or concerns N/A [address] [place] [address] [phone] --    [facility] Declined  Behavior issues or concerns N/A [address] [facility] [id number] [phone] --    [facility] Declined  Behavior issues or concerns N/A [address] [id number] [phone] --    [facility] Declined  Behavior issues or concerns N/A [address] [place] [id number] [phone] --    [facility] Declined  Behavior issues or concerns N/A [address] [address] [phone] --    [facility] Declined  Behavior issues or concerns N/A [address], [place] [address] [phone] --    [facility] Declined  Behavior issues or concerns N/A [address] [address] [phone] --     [facility] Declined  Behavior issues or concerns N/A [address], [facility] [address] [phone] --    [facility] Declined  Behavior issues or concerns N/A [address] [facility] [address] [phone] --              Expected Discharge Date and Time     Expected Discharge Date Expected Discharge Time    Nov 4, 2022          Demographic Summary    No documentation.                Functional Status    No documentation.                Psychosocial    No documentation.                Abuse/Neglect    No documentation.                Legal    No documentation.                Substance Abuse    No documentation.                Patient Forms    No documentation.                   Josep Sweeney RN

## 2022-11-02 NOTE — THERAPY PROGRESS REPORT/RE-CERT
Patient Name: Omkar Nava  : 1955    MRN: [id number]                              Today's Date: 2022       Admit Date: 10/14/2022    Visit Dx:     ICD-10-CM ICD-9-CM   1. Osteomyelitis of toe of left foot (HCC)  M86.9 730.27   2. Anemia, unspecified type  D64.9 285.9   3. Hyperglycemia  R73.9 790.29   4. 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, without long-term current use of insulin   • Orthostatic hypotension   • Acute osteomyelitis of left foot (HCC)   • Type 2 diabetes mellitus (HCC)   • Essential hypertension   • Psychotic disorder (HCC)   • Neurocognitive disorder     Past Medical History:   Diagnosis Date   • Diabetes mellitus (HCC)    • GERD (gastroesophageal reflux disease)      Past Surgical History:   Procedure Laterality Date   • AMPUTATION FOOT Left 10/18/2022    Procedure: PARTIAL FIRST RAY AMPUTATION LEFT;  Surgeon: Yeison Petty MD;  Location: [facility];  Service: Orthopedics;  Laterality: Left;   • EYE SURGERY        General Information     Row Name 22 1520          OT Time and Intention    Document Type progress note/recertification  -JY     Mode of Treatment occupational therapy;individual therapy  -JY     Row Name 22 1520          General Information    Patient Profile Reviewed yes  -JY     Existing Precautions/Restrictions fall;non-weight bearing;left;other (see comments)  NWB LLE, low vision, cognitive deficits w/ baseline dementia  -JY     Barriers to Rehab cognitive status;visual deficit  -JY     Row Name 22 1520          Cognition    Orientation Status (Cognition) oriented to;person;other (see comments);disoriented to;place;time;situation  conversational confusion still present and grossly only oriented to self, intermittent periods of  improved alertness  -SANDRA     Row Name 11/02/22 1520          Safety Issues, Functional Mobility    Safety Issues Affecting Function (Mobility) awareness of need for assistance;insight into deficits/self-awareness;judgment;problem-solving;safety precaution awareness;safety precautions follow-through/compliance;sequencing abilities  -SANDRA     Impairments Affecting Function (Mobility) balance;cognition;coordination;endurance/activity tolerance;motor control;postural/trunk control;strength;visual/perceptual  -SANDRA     Cognitive Impairments, Mobility Safety/Performance insight into deficits/self-awareness;judgment;problem-solving/reasoning;safety precaution awareness;safety precaution follow-through;sequencing abilities  -SANDRA     Comment, Safety Issues/Impairments (Mobility) OOB activity not completed today d/t intermittent periods of fatigue, conversational confusion and inconsistent command following (improved)  -OBIESARAH           User Key  (r) = Recorded By, (t) = Taken By, (c) = Cosigned By    Initials Name Provider Type    Jenniffer De Jesus OT Occupational Therapist                 Mobility/ADL's     Row Name 11/02/22 1524          Bed Mobility    Comment, (Bed Mobility) pt sitting up in bed with good positioning observed and OOB activity deferred thus bed mobility not assessed this date; pt able to sit up in bed [place]  -SANDRA     Row Name 11/02/22 1524          Transfers    Comment, (Transfers) OOB activity deferred this date d/t intermittent lethargy and inconsistent command following, both of which are safety concerns  -SANDRA     Row Name 11/02/22 1524          Functional Mobility    Functional Mobility- Comment defer to PT for specifics  -SANDRA     [facility] Name 11/02/22 1524          Activities of Daily Living    BADL Assessment/Intervention grooming;upper body dressing  -SANDRA     [facility] Name 11/02/22 1524          Mobility    Extremity Weight-bearing Status left lower extremity  -SARAH     Left Lower Extremity (Weight-bearing Status) non  weight-bearing (NWB)   -JY     Row Name 11/02/22 1524          Upper Body Dressing Assessment/Training    [place] Level (Upper Body Dressing) doff;don;pajama/robe;other (see comments)  pt grossly deferred authentic d/d gown however able to verbalize correct seq for threading and unthreading gown  -JY     Position (Upper Body Dressing) sitting up in bed  -JY     Comment, (Upper Body Dressing) pt grossly deferred authentic d/d gown however able to verbalize correct seq for threading and unthreading gown  -JY     Row Name 11/02/22 1524          Lower Body Dressing Assessment/Training    [place] Level (Lower Body Dressing) doff;don;socks;dependent (less than 25% patient effort)  -JY     Position (Lower Body Dressing) supine  -JY     Comment, (Lower Body Dressing) gross dep for safe d/d sock to R foot  -JY     Row Name 11/02/22 1524          Grooming Assessment/Training    [place] Level (Grooming) wash face, hands;minimum assist (75% patient effort);verbal cues  -JY     Position (Grooming) sitting up in bed  -JY     Comment, (Grooming) min A for t/f to face with cloth to wash, skilled cues for initiation and follow through, pt's attention deficits to task require physical and cognitive A  -JY           User Key  (r) = Recorded By, (t) = Taken By, (c) = Cosigned By    Initials Name Provider Type    Jenniffer De Jesus OT Occupational Therapist               Obj/Interventions     Row Name 11/02/22 1528          Strength Comprehensive (MMT)    General Manual Muscle Testing (MMT) Assessment upper extremity strength deficits identified  -JY     Comment, General Manual Muscle Testing (MMT) Assessment pt demonstrated improved ability to follow commands for BUE MMS assessment largely at least 4/5 bilaterally  -JY     Row Name 11/02/22 1528          Motor Skills    Motor Skills functional endurance  -J     Functional Endurance pt continues to present with fatigue with more dynamic activity, lethargic at times  impacting fluid alertness for more progressive interventions  -     Row Name 11/02/22 1528          Balance    Comment, Balance u/a to assess progressive balance at EOB or OOB this date d/t lethargy and inconsistent command following, no obvious balance concerns when supported up in bed  -J           User Key  (r) = Recorded By, (t) = Taken By, (c) = Cosigned By    Initials Name Provider Type    Jenniffer De Jesus, OT Occupational Therapist               Goals/Plan     Row Name 11/02/22 1535          Bed Mobility Goal 1 (OT)    Activity/Assistive Device (Bed Mobility Goal 1, OT) rolling to left;rolling to right;sit to supine;supine to sit  -JY     [place] Level/Cues Needed (Bed Mobility Goal 1, OT) moderate assist (50-74% patient effort)  -JY     Time Frame (Bed Mobility Goal 1, OT) long term goal (LTG);by discharge  -JY     Progress/Outcomes (Bed Mobility Goal 1, OT) goal ongoing  -[facility] Name 11/02/22 1535          Dressing Goal 1 (OT)    Activity/Device (Dressing Goal 1, OT) upper body dressing  -JY     [place]/Cues Needed (Dressing Goal 1, OT) minimum assist (75% or more patient effort);verbal cues required;tactile cues required  -JY     Time Frame (Dressing Goal 1, OT) long term goal (LTG);by discharge  -JY     Progress/Outcome (Dressing Goal 1, OT) goal ongoing;goal revised this date  -     Row Name 11/02/22 1535          Grooming Goal 1 (OT)    Activity/Device (Grooming Goal 1, OT) hair care;wash face, hands;oral care  -JY     [place] (Grooming Goal 1, OT) contact guard required;minimum assist (75% or more patient effort);verbal cues required  -JY     Time Frame (Grooming Goal 1, OT) long term goal (LTG);by discharge  -JY     Progress/Outcome (Grooming Goal 1, OT) goal ongoing;goal revised this date  -[facility] Name 11/02/22 1535          Strength Goal 1 (OT)    Strength Goal 1 (OT) Pt to complete seated HEP consistent with pt's physical and cognitive needs encompassing BUEs focused on  strength and endurance w/ progressive sets/reps/resistance in order to progress integration in ADLs, related t/fs  -JY     Time Frame (Strength Goal 1, OT) long term goal (LTG);by discharge  -JY     Progress/Outcome (Strength Goal 1, OT) goal ongoing  -JY     Row Name 11/02/22 1535          Therapy Assessment/Plan (OT)    Planned Therapy Interventions (OT) activity tolerance training;BADL retraining;functional balance retraining;occupation/activity based interventions;patient/caregiver education/training;ROM/therapeutic exercise;strengthening exercise;transfer/mobility retraining  -JY           User Key  (r) = Recorded By, (t) = Taken By, (c) = Cosigned By    Initials Name Provider Type    Jenniffer De Jesus OT Occupational Therapist               Clinical Impression     Row Name 11/02/22 1530          Pain Assessment    Pretreatment Pain Rating 0/10 - no pain  -JY     Posttreatment Pain Rating 0/10 - no pain  -JY     Pre/Posttreatment Pain Comment denies any pain  -JY     Pain Intervention(s) Ambulation/increased activity  -JY     Row Name 11/02/22 1535          Plan of Care Review    Plan of Care Reviewed With patient  -JY     Progress no change  -JY     Outcome Evaluation OT re cert completed this date. Pt presented with improved alertness and w/o agitation compared to recent sessions however still oriented to self only and w/ conversational confusion, lethargy and inconsistent command following. Pt able to verbally seq steps for d/d gown and UE movement to support more I UBD and washed face with min A after cues for initiation and follow through. Continues to be dependent for d/d R sock. Pt able to state NWB precaution when cued. Pt sat up through session and also demonstrated I eating of snack. Pt grossly demonstrates small functional gains, grossly more involvement and fxl task awareness. Pt will need further ADL retraining and plan to provide services as pt able to tolerate while hospitalized. Goals graded as  necessary. Continue to recommend SNF at d/c.  -JSARAH     Row Name 11/02/22 1530          Therapy Assessment/Plan (OT)    Criteria for Skilled Therapeutic Interventions Met (OT) yes;skilled treatment is necessary  -JY     Therapy Frequency (OT) daily  -JSARAH     Row Name 11/02/22 1530          Therapy Plan Review/Discharge Plan (OT)    Anticipated Discharge Disposition (OT) skilled nursing facility  -     Row Name 11/02/22 1530          Vital Signs    Pre Systolic BP Rehab 149  -JY     Pre Treatment Diastolic BP 68  -JY     Pretreatment Heart Rate (beats/min) 78  -JY     Pre SpO2 (%) 96  -JY     Pre Patient Position Supine  -JY     Intra Patient Position Supine  -JY     Post Patient Position Supine  -JY     Row Name 11/02/22 1530          Positioning and Restraints    Pre-Treatment Position in bed  -JY     Post Treatment Position bed  -JY     In Bed notified nsg;fowlers;call light within reach;encouraged to call for assist;exit alarm on;side rails up x2  -JY           User Key  (r) = Recorded By, (t) = Taken By, (c) = Cosigned By    Initials Name Provider Type    Jenniffer De Jesus, OT Occupational Therapist               Outcome Measures     Row Name 11/02/22 1536          How much help from another is currently needed...    Putting on and taking off regular lower body clothing? 1  -JY     Bathing (including washing, rinsing, and drying) 1  -JY     Toileting (which includes using toilet bed pan or urinal) 1  -JY     Putting on and taking off regular upper body clothing 2  -JY     Taking care of personal grooming (such as brushing teeth) 3  -JY     Eating meals 3  -JY     AM-PAC 6 Clicks Score (OT) 11  -JY     Row Name 11/02/22 3353          How much help from another person do you currently need...    Turning from your back to your side while in flat bed without using bedrails? 3  -ES     Moving from lying on back to sitting on the side of a flat bed without bedrails? 3  -ES     Moving to and from a bed to a chair  (including a wheelchair)? 1  -ES     Standing up from a chair using your arms (e.g., wheelchair, bedside chair)? 1  -ES     Climbing 3-5 steps with a railing? 1  -ES     To walk in hospital room? 1  -ES     AM-PAC 6 Clicks Score (PT) 10  -ES     Highest level of mobility 4 --> Transferred to chair/commode  -ES     Row Name 11/02/22 1536 11/02/22 1423       Functional Assessment    Outcome Measure Options AM-PAC 6 Clicks Daily Activity (OT)  -SANDRA AM-PAC 6 Clicks Basic Mobility (PT)  -ES          User Key  (r) = Recorded By, (t) = Taken By, (c) = Cosigned By    Initials Name Provider Type    Jenniffer De Jesus OT Occupational Therapist    ES Emmy Castellanos, PT Physical Therapist                Occupational Therapy Education     Title: PT OT SLP Therapies (In Progress)     Topic: Occupational Therapy (In Progress)     Point: ADL training (In Progress)     Description:   Instruct learner(s) on proper safety adaptation and remediation techniques during self care or transfers.   Instruct in proper use of assistive devices.              Learning Progress Summary           Patient Acceptance, E,D, NR by SANDRA at 11/2/2022 1505    Acceptance, E, VU by PEG at 10/26/2022 2333    Acceptance, E, VU by PEG at 10/25/2022 2209    Acceptance, E,D, NR by SANDRA at 10/24/2022 1340    Acceptance, E, VU by PEG at 10/22/2022 2204    Acceptance, E,D, NR by SANDRA at 10/19/2022 1445                   Point: Home exercise program (Done)     Description:   Instruct learner(s) on appropriate technique for monitoring, assisting and/or progressing therapeutic exercises/activities.              Learning Progress Summary           Patient Acceptance, E, VU by PEG at 10/26/2022 2333    Acceptance, E, VU by LS at 10/25/2022 2209    Acceptance, E, VU by LS at 10/22/2022 2204                   Point: Precautions (In Progress)     Description:   Instruct learner(s) on prescribed precautions during self-care and functional transfers.              Learning Progress  Summary           Patient Acceptance, E,D, NR by SANDRA at 11/2/2022 1505    Acceptance, E, VU by  at 10/26/2022 2333    Acceptance, E, VU by  at 10/25/2022 2209    Acceptance, E,D, NR by SANDRA at 10/24/2022 1340    Acceptance, E, VU by LS at 10/22/2022 2204    Acceptance, E,D, NR by SANDRA at 10/19/2022 1445                   Point: Body mechanics (In Progress)     Description:   Instruct learner(s) on proper positioning and spine alignment during self-care, functional mobility activities and/or exercises.              Learning Progress Summary           Patient Acceptance, E,D, NR by SANDRA at 11/2/2022 1505    Acceptance, E, VU by PEG at 10/26/2022 2333    Acceptance, E, VU by  at 10/25/2022 2209    Acceptance, E,D, NR by SANDRA at 10/24/2022 1340    Acceptance, E, VU by  at 10/22/2022 2204    Acceptance, E,D, NR by SANDRA at 10/19/2022 1445                               User Key     Initials Effective Dates Name Provider Type Discipline    SANDRA 06/16/21 -  Jenniffer Lee OT Occupational Therapist OT     09/22/22 -  Augusta Singleton RN Registered Nurse Nurse              OT Recommendation and Plan  Planned Therapy Interventions (OT): activity tolerance training, BADL retraining, functional balance retraining, occupation/activity based interventions, patient/caregiver education/training, ROM/therapeutic exercise, strengthening exercise, transfer/mobility retraining  Therapy Frequency (OT): daily  Plan of Care Review  Plan of Care Reviewed With: patient  Progress: no change  Outcome Evaluation: OT re cert completed this date. Pt presented with improved alertness and w/o agitation compared to recent sessions however still oriented to self only and w/ conversational confusion, lethargy and inconsistent command following. Pt able to verbally seq steps for d/d gown and UE movement to support more I UBD and washed face with min A after cues for initiation and follow through. Continues to be dependent for d/d R sock. Pt able to state  NWB precaution when cued. Pt sat up through session and also demonstrated I eating of snack. Pt grossly demonstrates small functional gains, grossly more involvement and fxl task awareness. Pt will need further ADL retraining and plan to provide services as pt able to tolerate while hospitalized. Goals graded as necessary. Continue to recommend SNF at d/c.     Time Calculation:    Time Calculation- OT     Row Name 11/02/22 1538             Time Calculation- OT    OT Start Time 1505  -JY      OT Received On 11/02/22  -JY      OT Goal Re-Cert Due Date 11/12/22  -JY         Timed Charges    99956 - OT Therapeutic Activity Minutes 7  -JY      29881 - OT Self Care/Mgmt Minutes 8  -JY         Total Minutes    Timed Charges Total Minutes 15  -JY       Total Minutes 15  -JY            User Key  (r) = Recorded By, (t) = Taken By, (c) = Cosigned By    Initials Name Provider Type    Jenniffer De Jesus OT Occupational Therapist              Therapy Charges for Today     Code Description Service Date Service Provider Modifiers Qty    [id number] HC OT SELF CARE/MGMT/TRAIN EA 15 MIN 11/2/2022 Jenniffer Lee OT GO 1               Jenniffer Lee OT  11/2/2022

## 2022-11-02 NOTE — PROGRESS NOTES
[facility] Medicine Services  PROGRESS NOTE    Patient Name: Omkar Nava  : 1955  MRN: [id number]    Date of Admission: 10/14/2022  Primary Care Physician: Provider, No Known    Subjective   Subjective     CC:  F/U left foot osteomyelitis    HPI:  Patient seen around lunch time, eating well without complaints.     ROS:  Gen-no fevers, no chills  CV-no chest pain, no palpitations  Resp-no cough, no dyspnea  GI-no N/V/D, no abd pain    All other systems reviewed and negative except any additional pertinent positives and negatives as discussed in HPI.      Objective   Objective     Vital Signs:   Temp:  [98 °F (36.7 °C)-98.5 °F (36.9 °C)] 98 °F (36.7 °C)  Heart Rate:  [64-73] 73  Resp:  [17-18] 18  BP: (121-149)/(64-81) 149/68  Flow (L/min):  [2] 2     Physical Exam:  Gen-no acute distress  HENT-NCAT, mucous membranes moist  CV-RRR, S1 S2 normal, no m/r/g  Resp-CTAB, no wheezes or rales  Abd-soft, NT, ND, +BS  Ext-no edema, left leg in splint, can wiggle toes  Neuro-A&Ox3, no focal deficits  Skin-no rashes  Psych-appropriate mood      Results Reviewed:  LAB RESULTS:      Lab 22  0844 10/31/22  0535   WBC 6.07 9.55   HEMOGLOBIN 7.7* 7.6*   HEMATOCRIT 25.4* 24.5*   PLATELETS 296 259   NEUTROS ABS  --  7.16*   IMMATURE GRANS (ABS)  --  0.04   LYMPHS ABS  --  1.45   MONOS ABS  --  0.72   EOS ABS  --  0.15   MCV 91.7 90.4         Lab 22  0844 10/31/22  0535   SODIUM 135* 132*   POTASSIUM 4.1 3.7   CHLORIDE 101 98   CO2 24.0 24.0   ANION GAP 10.0 10.0   BUN 22 19   CREATININE 1.00 1.07   EGFR 83.5 77.0   GLUCOSE 174* 159*   CALCIUM 9.0 8.6         Lab 10/31/22  0535   TOTAL PROTEIN 6.8   ALBUMIN 2.90*   GLOBULIN 3.9   ALT (SGPT) 16   AST (SGOT) 11   BILIRUBIN <0.2   ALK PHOS 106                     Brief Urine Lab Results  (Last result in the past 365 days)      Color   Clarity   Blood   Leuk Est   Nitrite   Protein   CREAT   Urine HCG        22 Yellow   Clear      Negative                     Microbiology Results Abnormal     Procedure Component Value - Date/Time    Fungus Culture - Tissue, Toe, Left [[id number]] Collected: 10/18/22 1608    Lab Status: Preliminary result Specimen: Tissue from Toe, Left Updated: 11/01/22 1815     Fungus Culture No fungus isolated at 2 weeks    AFB Culture - Tissue, Toe, Left [[id number]] Collected: 10/18/22 1608    Lab Status: Preliminary result Specimen: Tissue from Toe, Left Updated: 11/01/22 1815     AFB Culture No AFB isolated at 2 weeks     AFB Stain No acid fast bacilli seen on concentrated smear    Fungus Culture - Tissue, Toe, Left [[id number]] Collected: 10/18/22 1552    Lab Status: Preliminary result Specimen: Tissue from Toe, Left Updated: 11/01/22 1801     Fungus Culture No fungus isolated at 2 weeks    AFB Culture - Tissue, Toe, Left [[id number]] Collected: 10/18/22 1552    Lab Status: Preliminary result Specimen: Tissue from Toe, Left Updated: 11/01/22 1801     AFB Culture No AFB isolated at 2 weeks     AFB Stain No acid fast bacilli seen on concentrated smear    COVID PRE-OP / PRE-PROCEDURE SCREENING ORDER (NO ISOLATION) - Swab, Nasopharynx [[id number]]  (Normal) Collected: 10/27/22 0510    Lab Status: Final result Specimen: Swab from Nasopharynx Updated: 10/27/22 0530    Narrative:      The following orders were created for panel order COVID PRE-OP / PRE-PROCEDURE SCREENING ORDER (NO ISOLATION) - Swab, Nasopharynx.  Procedure                               Abnormality         Status                     ---------                               -----------         ------                     COVID-19, ABBOTT IN-HOUS...[[id number]]  Normal              Final result                 Please view results for these tests on the individual orders.    COVID-19, ABBOTT IN-HOUSE,NASAL Swab (NO TRANSPORT MEDIA) 2 HR TAT - Swab, Nasopharynx [[id number]]  (Normal) Collected: 10/27/22 0510    Lab Status: Final result Specimen: Swab from Nasopharynx  Updated: 10/27/22 0530     COVID19 Presumptive Negative    Narrative:      Fact sheet for providers: https://www.fda.gov/media/803457/download     Fact sheet for patients: https://www.fda.gov/media/253734/download    Test performed by PCR.  If inconsistent with clinical signs and symptoms patient should be tested with different authorized molecular test.    Anaerobic Culture - Tissue, Toe, Left [[id number]]  (Normal) Collected: 10/18/22 1608    Lab Status: Final result Specimen: Tissue from Toe, Left Updated: 10/23/22 0543     Anaerobic Culture No anaerobes isolated at 5 days    Anaerobic Culture - Tissue, Toe, Left [[id number]]  (Normal) Collected: 10/18/22 1552    Lab Status: Final result Specimen: Tissue from Toe, Left Updated: 10/23/22 0543     Anaerobic Culture No anaerobes isolated at 5 days    Tissue / Bone Culture - Tissue, Toe, Left [[id number]] Collected: 10/18/22 1552    Lab Status: Final result Specimen: Tissue from Toe, Left Updated: 10/21/22 0840     Tissue Culture No growth at 3 days     Gram Stain Moderate (3+) WBCs seen      No organisms seen    Blood Culture - Blood, Hand, Left [[id number]]  (Normal) Collected: 10/14/22 2115    Lab Status: Final result Specimen: Blood from Hand, Left Updated: 10/19/22 2215     Blood Culture No growth at 5 days    Blood Culture - Blood, Arm, Left [[id number]]  (Normal) Collected: 10/14/22 2115    Lab Status: Final result Specimen: Blood from Arm, Left Updated: 10/19/22 2215     Blood Culture No growth at 5 days    MRSA Screen, PCR (Inpatient) - Swab, Nares [[id number]]  (Normal) Collected: 10/14/22 2345    Lab Status: Final result Specimen: Swab from Nares Updated: 10/15/22 0741     MRSA PCR Negative    Narrative:      The negative predictive value of this diagnostic test is high and should only be used to consider de-escalating anti-MRSA therapy. A positive result may indicate colonization with MRSA and must be correlated clinically.  MRSA Negative          No radiology  "results from the last 24 hrs        I have reviewed the medications:  Scheduled Meds:amLODIPine, 10 mg, Oral, Q24H  cefTRIAXone, 2 g, Intravenous, Q24H  ferrous sulfate, 325 mg, Oral, Daily With Breakfast  heparin (porcine), 5,000 Units, Subcutaneous, Q8H  insulin detemir, 10 Units, Subcutaneous, Nightly  insulin lispro, 0-7 Units, Subcutaneous, TID AC  Insulin Lispro, 8 Units, Subcutaneous, TID With Meals  lisinopril, 40 mg, Oral, Daily  OLANZapine, 5 mg, Oral, Daily   And  OLANZapine, 10 mg, Oral, Nightly  senna-docusate sodium, 2 tablet, Oral, Nightly  sodium chloride, 10 mL, Intravenous, Q12H  sodium chloride, 10 mL, Intravenous, Q12H  vancomycin, 750 mg, Intravenous, Q12H      Continuous Infusions:   PRN Meds:.•  acetaminophen **OR** acetaminophen  •  bisacodyl  •  dextrose  •  dextrose  •  glucagon (human recombinant)  •  magnesium sulfate **OR** magnesium sulfate in D5W 1g/100mL (PREMIX) **OR** magnesium sulfate  •  melatonin  •  [] HYDROmorphone **AND** naloxone  •  polyethylene glycol  •  Insert peripheral IV **AND** sodium chloride  •  sodium chloride  •  sodium chloride    Assessment & Plan   Assessment & Plan     Active Hospital Problems    Diagnosis  POA   • **Acute osteomyelitis of left foot (HCC) [M86.172]  Yes   • Neurocognitive disorder [R41.9]  Unknown   • Type 2 diabetes mellitus (HCC) [E11.9]  Yes   • Essential hypertension [I10]  Yes   • Psychotic disorder (HCC) [F29]  Yes      Resolved Hospital Problems   No resolved problems to display.        Brief Hospital Course to date:  Omkar Nava is a 67 y.o. male with past medical history significant for DM2, HTN, and unspecified psychotic disorder presents to the ED due to worsening left foot wound. Per my partner's note, \"patient presented to [facility] on 2022 due to mechanical fall causing open articular fracture on the left foot. He was treated at that time with bedside flushing and repair by Podiatry. He was " "given Ancef and sent back to nursing facility on Keflex. Wound cultures at that time grew MRSA; however, blood culture showed no growth. On 9/27/2022 patient presented back to hospital in [place] due to worsening foot wound and noted drainage. He underwent left foot debridement and I&D on 9/27/2022. He again underwent left foot debridement and I&D but required no amputation on 9/30/2022.  PICC line was placed on 10/3/2022. Patient was to receive Daptomycin; however due to expense this was changed to Rifampin and Linezolid. PICC line was removed. At some point, patient had transferred from nursing facility in [place] to [place] to be closer to family.\"    This patient's problems and plans were partially entered by my partner and updated as appropriate by me 11/02/22.     Osteomyelitis of left foot/1st great toe (s/p amputation)  Raoultella Ornithinolytica growing from wound  - MRI shows wound around the great toe with geographic marrow signal alteration within the distal aspect of the first metatarsal as well as both phalanges of the great toe indicative of osteomyelitis  -s/p left great toe amputation on 10/18/22 by Dr. Petty  - NWB LLE. Elevate LLE. Keep splint clean and dry. SQ heparin for DVT prophylaxis while inpatient. Discharge on  mg daily x 30 days.  - Dressing changed 11/1/22; Has follow up with Dr. Petty  scheduled 11/8/22. Sutures remain in place. Appointment may need rescheduled if he is not discharged by then.  - PICC placed 10/27/22  - ID following (Dr. Gordon): plan for IV Vanc and Rocephin through 11/25/22.  Follow up with Dr. Gordon in 2 weeks. Per ID: Pharmacy to adjust dose of vancomycin based on weekly trough levels to try to maintain level between 12 and 18.       Anemia  Iron deficiency  - Completed 3 days IV iron on 10/20/22  - Overall stable; occult negative      DM II (HbA1c 7.0)  - Continue Levemir 10 units nightly  - Continue Humalog 8 units TID with meals  - Continue " SSI   - Adjust PRN      HTN  - Increased to Amlodipine 10 mg daily and Lisinopril 40 mg daily with improvement   - BP controlled      Neurocognitive disorder   Dementia   - Re-started olanzapine 10/15/22, was recently hospitalized at [facility] for psychiatric issues; apparently PRN Haldol was not much help, have discontinued  - Continue olanzapine 5 mg qAM and 10 mg qHS   - Overall stable    Expected Discharge Location and Transportation: rehab  Expected Discharge Date: anytime placement is found, patient medically ready    DVT prophylaxis:  Medical and mechanical DVT prophylaxis orders are present.     AM-PAC 6 Clicks Score (PT): 10 (11/02/22 5560)    CODE STATUS:   Code Status and Medical Interventions:   Ordered at: 10/14/22 7080     Level Of Support Discussed With:    Patient     Code Status (Patient has no pulse and is not breathing):    CPR (Attempt to Resuscitate)     Medical Interventions (Patient has pulse or is breathing):    Full Support       Ludmila Parish MD  11/02/22

## 2022-11-02 NOTE — PLAN OF CARE
Goal Outcome Evaluation:         Pt is alert and oriented to self; confused. VSS on 2L NC. Pt has slept off and on throughout the shift. No c/o of pain. PRN melatonin given as ordered. Splint in place/ CDI. Incontinent of B&B; voiding spontaneously. 2 small Bms this shift. Waffle mattress in place. Will continue to monitor.

## 2022-11-02 NOTE — PLAN OF CARE
Goal Outcome Evaluation:  Plan of Care Reviewed With: patient        Progress: no change  Outcome Evaluation: Pt more alert today and able to answer questions with frequent prompting/redirecting. Increased time required for education regarding importance of participating with therapy in recovery process. Pt presented with increased fatigue throughout session and required modA to complete exercises. Will continue to progress as able.

## 2022-11-02 NOTE — THERAPY PROGRESS REPORT/RE-CERT
Patient Name: Omkar Nava  : 1955    MRN: [id number]                              Today's Date: 2022       Admit Date: 10/14/2022    Visit Dx:     ICD-10-CM ICD-9-CM   1. Osteomyelitis of toe of left foot (HCC)  M86.9 730.27   2. Anemia, unspecified type  D64.9 285.9   3. Hyperglycemia  R73.9 790.29   4. 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, without long-term current use of insulin   • Orthostatic hypotension   • Acute osteomyelitis of left foot (HCC)   • Type 2 diabetes mellitus (HCC)   • Essential hypertension   • Psychotic disorder (HCC)   • Neurocognitive disorder     Past Medical History:   Diagnosis Date   • Diabetes mellitus (HCC)    • GERD (gastroesophageal reflux disease)      Past Surgical History:   Procedure Laterality Date   • AMPUTATION FOOT Left 10/18/2022    Procedure: PARTIAL FIRST RAY AMPUTATION LEFT;  Surgeon: Yeison Petty MD;  Location: [facility];  Service: Orthopedics;  Laterality: Left;   • EYE SURGERY        General Information     Row Name 22 1413          Physical Therapy Time and Intention    Document Type progress note/recertification  -ES     Mode of Treatment physical therapy  -ES     Row Name 22 1413          General Information    Patient Profile Reviewed yes  -ES     Existing Precautions/Restrictions fall;non-weight bearing;left;other (see comments)  nwb,vision loss, dementia  -ES     Barriers to Rehab cognitive status;visual deficit  -ES     Row Name 22 1413          Cognition    Orientation Status (Cognition) unable/difficult to assess  -ES     Row Name 22 1413          Safety Issues, Functional Mobility    Safety Issues Affecting Function (Mobility) awareness of need for assistance;insight into  deficits/self-awareness;judgment;problem-solving;safety precaution awareness;safety precautions follow-through/compliance  -ES     Impairments Affecting Function (Mobility) balance;cognition;coordination;endurance/activity tolerance;motor control;postural/trunk control;strength;visual/perceptual  -ES     Comment, Safety Issues/Impairments (Mobility) OOB activity not attempted today due to increased fatigue and impaired command following  -ES           User Key  (r) = Recorded By, (t) = Taken By, (c) = Cosigned By    Initials Name Provider Type    Emmy Arriaga PT Physical Therapist               Mobility     Row Name 11/02/22 1415          Bed Mobility    Rolling Right [place] (Bed Mobility) maximum assist (25% patient effort);verbal cues  -ES     Row Name 11/02/22 1415          Transfers    Comment, (Transfers) OOB activity deferred today due to increased fatigue and impaired command following  -ES     Row Name 11/02/22 1415          Gait/Stairs (Locomotion)    [place] Level (Gait) unable to assess  -ES     Comment, (Gait/Stairs) Not appropriate to attempt at this time  -ES     Row Name 11/02/22 1415          Mobility    Extremity Weight-bearing Status left lower extremity  -ES     Left Lower Extremity (Weight-bearing Status) non weight-bearing (NWB)  -ES           User Key  (r) = Recorded By, (t) = Taken By, (c) = Cosigned By    Initials Name Provider Type    Emmy Arriaga PT Physical Therapist               Obj/Interventions     Row Name 11/02/22 1416          Motor Skills    Therapeutic Exercise hip;knee;ankle;other (see comments)  Required max v/c and modA to complete therex  -ES     Row Name 11/02/22 1416          Hip (Therapeutic Exercise)    Hip (Therapeutic Exercise) strengthening exercise  -ES     Hip Strengthening (Therapeutic Exercise) right;aBduction;heel slides;10 repetitions  -ES     Row Name 11/02/22 1416          Knee (Therapeutic Exercise)    Knee (Therapeutic Exercise)  strengthening exercise;isometric exercises  -ES     Knee Isometrics (Therapeutic Exercise) right;quad sets;10 repetitions  -ES     Knee Strengthening (Therapeutic Exercise) right;heel slides  -ES     Row Name 11/02/22 1416          Ankle (Therapeutic Exercise)    Ankle (Therapeutic Exercise) AROM (active range of motion)  -ES     Ankle AROM (Therapeutic Exercise) right;dorsiflexion;plantarflexion;10 repetitions  -ES     Row Name 11/02/22 1416          Balance    Comment, Balance Unable to assess OOB activity due to increased fatigue and impaired command following  -ES           User Key  (r) = Recorded By, (t) = Taken By, (c) = Cosigned By    Initials Name Provider Type    ES Emmy Castellanos, PT Physical Therapist               Goals/Plan     Row Name 11/02/22 1422          Bed Mobility Goal 1 (PT)    Activity/Assistive Device (Bed Mobility Goal 1, PT) bed mobility activities, all;sit to supine;supine to sit;scooting  -ES     [place] Level/Cues Needed (Bed Mobility Goal 1, PT) minimum assist (75% or more patient effort)  -ES     Time Frame (Bed Mobility Goal 1, PT) 2 weeks  -ES     Progress/Outcomes (Bed Mobility Goal 1, PT) progress slower than expected;goal revised this date  -ES     Row Name 11/02/22 1422          Transfer Goal 1 (PT)    Activity/Assistive Device (Transfer Goal 1, PT) sit-to-stand/stand-to-sit;bed-to-chair/chair-to-bed  -ES     [place] Level/Cues Needed (Transfer Goal 1, PT) moderate assist (50-74% patient effort)  -ES     Time Frame (Transfer Goal 1, PT) 2 weeks  -ES     Progress/Outcome (Transfer Goal 1, PT) progress slower than expected;goal revised this date  -ES     Row Name 11/02/22 1422          Therapy Assessment/Plan (PT)    Planned Therapy Interventions (PT) balance training;bed mobility training;gait training;home exercise program;neuromuscular re-education;patient/family education;strengthening;transfer training  -ES           User Key  (r) = Recorded By, (t) = Taken By,  (c) = Cosigned By    Initials Name Provider Type    ES Emmy Castellanos, PT Physical Therapist               Clinical Impression     Row Name 11/02/22 1419          Pain    Pretreatment Pain Rating 0/10 - no pain  -ES     Posttreatment Pain Rating 0/10 - no pain  -ES     Pre/Posttreatment Pain Comment Pt denied pain and did not give any physical indications of pain throughout treatment  -ES     Pain Intervention(s) Repositioned;Ambulation/increased activity  -ES     Row Name 11/02/22 1419          Plan of Care Review    Plan of Care Reviewed With patient  -ES     Progress no change  -ES     Outcome Evaluation Pt more alert today and able to answer questions with frequent prompting/redirecting. Increased time required for education regarding importance of participating with therapy in recovery process. Pt presented with increased fatigue throughout session and required modA to complete exercises. Will continue to progress as able.  -ES     Row Name 11/02/22 1419          Therapy Assessment/Plan (PT)    Rehab Potential (PT) fair, will monitor progress closely  -ES     Criteria for Skilled Interventions Met (PT) yes;meets criteria  -ES     Therapy Frequency (PT) 3 times/wk  -ES     Row Name 11/02/22 1419          Vital Signs    Pre Systolic BP Rehab --  VSS. cleared by RN.  -ES     O2 Delivery Pre Treatment room air  -ES     O2 Delivery Intra Treatment room air  -ES     O2 Delivery Post Treatment room air  -ES     Pre Patient Position Supine  -ES     Intra Patient Position Supine  -ES     Post Patient Position Supine  -ES     Row Name 11/02/22 1419          Positioning and Restraints    Pre-Treatment Position in bed  -ES     Post Treatment Position bed  -ES     In Bed notified nsg;fowlers;call light within reach;encouraged to call for assist;exit alarm on;side rails up x2;LLE elevated  -ES           User Key  (r) = Recorded By, (t) = Taken By, (c) = Cosigned By    Initials Name Provider Type    Emmy Arriaga, PT  Physical Therapist               Outcome Measures     Row Name 11/02/22 1423          How much help from another person do you currently need...    Turning from your back to your side while in flat bed without using bedrails? 3  -ES     Moving from lying on back to sitting on the side of a flat bed without bedrails? 3  -ES     Moving to and from a bed to a chair (including a wheelchair)? 1  -ES     Standing up from a chair using your arms (e.g., wheelchair, bedside chair)? 1  -ES     Climbing 3-5 steps with a railing? 1  -ES     To walk in hospital room? 1  -ES     AM-PAC 6 Clicks Score (PT) 10  -ES     Highest level of mobility 4 --> Transferred to chair/commode  -ES     Row Name 11/02/22 1423          Functional Assessment    Outcome Measure Options AM-PAC 6 Clicks Basic Mobility (PT)  -ES           User Key  (r) = Recorded By, (t) = Taken By, (c) = Cosigned By    Initials Name Provider Type    ES Emmy Castellanos PT Physical Therapist                             Physical Therapy Education     Title: PT OT SLP Therapies (Resolved)     Topic: Physical Therapy (Resolved)     Point: Mobility training (Resolved)     Learning Progress Summary           Patient Acceptance, E, VU by  at 10/26/2022 2333    Eager, E, VU by SC at 10/26/2022 1543    Comment: reviewed HEp    Acceptance, E, VU by  at 10/25/2022 2209    Acceptance, E, NR by SC at 10/25/2022 1633    Comment: reviewed benefits of sitting up    Acceptance, E, VU by  at 10/22/2022 2204    Acceptance, E, NL by SC at 10/19/2022 1512    Comment: reviewed benefits of actiity                   Point: Home exercise program (Resolved)     Learning Progress Summary           Patient Acceptance, E, VU by  at 10/26/2022 2333    Eager, E, VU by SC at 10/26/2022 1543    Comment: reviewed HEp    Acceptance, E, VU by  at 10/25/2022 2209    Acceptance, E, NR by SC at 10/25/2022 1633    Comment: reviewed benefits of sitting up    Acceptance, E, VU by  at 10/22/2022  2204    Acceptance, E, NL by SC at 10/19/2022 1512    Comment: reviewed benefits of actiity                   Point: Body mechanics (Resolved)     Learning Progress Summary           Patient Acceptance, E, VU by  at 10/26/2022 2333    Eager, E, VU by SC at 10/26/2022 1543    Comment: reviewed HEp    Acceptance, E, VU by  at 10/25/2022 2209    Acceptance, E, NR by SC at 10/25/2022 1633    Comment: reviewed benefits of sitting up    Acceptance, E, VU by  at 10/22/2022 2204    Acceptance, E, NL by SC at 10/19/2022 1512    Comment: reviewed benefits of actiity                   Point: Precautions (Resolved)     Learning Progress Summary           Patient Acceptance, E, VU by  at 10/26/2022 2333    Eager, E, VU by SC at 10/26/2022 1543    Comment: reviewed HEp    Acceptance, E, VU by  at 10/25/2022 2209    Acceptance, E, NR by SC at 10/25/2022 1633    Comment: reviewed benefits of sitting up    Acceptance, E, VU by  at 10/22/2022 2204    Acceptance, E, NL by SC at 10/19/2022 1512    Comment: reviewed benefits of actiity                               User Key     Initials Effective Dates Name Provider Type Discipline    SC 06/16/21 -  Efra Cristobal PT Physical Therapist PT     09/22/22 -  Augusta Singleton RN Registered Nurse Nurse              PT Recommendation and Plan  Planned Therapy Interventions (PT): balance training, bed mobility training, gait training, home exercise program, neuromuscular re-education, patient/family education, strengthening, transfer training  Plan of Care Reviewed With: patient  Progress: no change  Outcome Evaluation: Pt more alert today and able to answer questions with frequent prompting/redirecting. Increased time required for education regarding importance of participating with therapy in recovery process. Pt presented with increased fatigue throughout session and required modA to complete exercises. Will continue to progress as able.     Time Calculation:    PT Charges      Row Name 11/02/22 1424             Time Calculation    Start Time 1400  -ES      PT Received On 11/02/22  -ES      PT Goal Re-Cert Due Date 11/12/22  -ES         Time Calculation- PT    Total Timed Code Minutes- PT 18 minute(s)  -ES         Timed Charges    65122 - PT Therapeutic Exercise Minutes 18  -ES         Total Minutes    Timed Charges Total Minutes 18  -ES       Total Minutes 18  -ES            User Key  (r) = Recorded By, (t) = Taken By, (c) = Cosigned By    Initials Name Provider Type    ES Emmy Castellanos PT Physical Therapist              Therapy Charges for Today     Code Description Service Date Service Provider Modifiers Qty    [id number] HC PT THER PROC EA 15 MIN 11/2/2022 Emmy Castellanos PT GP 1          PT G-Codes  Outcome Measure Options: AM-PAC 6 Clicks Basic Mobility (PT)  AM-PAC 6 Clicks Score (PT): 10  AM-PAC 6 Clicks Score (OT): 10    Emmy Castellanos PT  11/2/2022

## 2022-11-02 NOTE — PROGRESS NOTES
INFECTIOUS DISEASE Progress Note    Omkar Nava  1955  [id number]    Consult: 10/15/22  Admit date: 10/14/2022    Requesting Provider: Omkar Seth MD  Evaluating physician:  Rayray Gordon MD  Reason for Consultation: left foot osteomyelitis, first toe, distal phalanx  Chief Complaint: left foot pain      Subjective   History of present illness:  Patient is a  67 y.o. male with h/o T2DM, PN, HTN, normocytic anemia, and GERD who presented to [facility] ED on 10/14 for worsening left foot pain.  The patient is a poor historian and most of the information was taken from the chart.  He tripped at his nursing facility, [facility], while running the street in flip-flops and sustained a laceration of his first webspace.  He was taken to [facility] ED on 9/17/22 and found to have a small intra-articular vertical fracture through the base the the great toe proximal phalanx. Podiatry, Dr. Ruiz, saw patient and the wound with irrigated with suture placement.  The area was dressed and he was placed in a surgical shoe.  He was given Cefazolin x 1 dose and discharged back to his facility on Keflex for 7 days.  On 9/26/22, the nursing staff at increased pain, swelling, and redness around the foot and sent him to [facility] ED on that day.  He was found to have a displaced fracture of proximal phalanx of left hallux along with abscess and gas in tissues.  He was admitted to the hospital and underwent Left foot debridement and I and D on 9/27/22 by Dr. Ruiz with culture positive for MRSA (Resistant to Cefazolin, clindamycin, erm, oxacillin, tetracycline, Bactrim and sensitive to Vancomycin, Daptomycin-KB 1, and Linezolid KB 2), Corynebacterium striatum, and Enterococcus faecalis (sens to Vanc and ampicillin).  He underwent a second debridement and I and D on 9/30.  He was given Cefazolin in ED and then changed to Daptomycin.  A PICC line was placed on 10/3 for Daptomycin infusions,  but the antibiotic was too expensive for the SNF, so he was changed to oral Zyvox and Rifampin until 10/26.  The PICC line was removed on 10/4.  His blood cultures remained negative.  He was discharged back to his nursing facility on 10/4/22.      He was sent to [facility] ED on 10/14 after nursing staff noted that his left hallux appeared necrotic.  He has had no fever, chills, shortness of breath, nausea, vomiting, diarrhea, dysuria, or worsening foot pain.  On arrival, he is afebrile and hemodynamically stable.  On 10/15, his BP went up to 203/81.  Admitting labs were WBC 7200 with 69% neutrophils, ESR 67, CRP 3.07, Hgb 9.2, PCT 0.05, lactic acid 2.3, and creatinine 1.21.  A MRSA PCR was negative.  Blood and wound cultures are pending.  An MRI of the left foot on 10/15 showed wound around the left great toe with marrow signal alteration within the distal aspect of 1st MT as well as both phalanges c/w osteomyelitis and scattered foci in soft tissues that may be foci of gas without evidence of fluid collections or abscesses. An Xray of left foot showed soft tissue swelling of 1st digit with displaced fracture of medial base of 1st proximal phalanx.  He is currently on Cefepime and Vancomycin.  ID was asked by Dr. Seth on 10/15 to evaluate and manage his antibiotic therapy.  Patient has had some issues with psychosis over the past 6 months possibly related to drug and alcohol use.  This is also interfered with his acceptance of care.    10/16/2022 history reviewed.  Patient more pleasant this morning.  No high fevers or chills.  Tolerating vancomycin and cefepime, duration to be determined.  Orthopedics following.  Previous history of MRSA.    10/17/22: history reviewed.  Patient with minimal response. Tmax 99.6.  On Vancomycin and Cefepime for left hallux osteomyelitis and surrounding infection with duration to be determined.  Awaiting evaluate by Dr. Petty as per Dr. Lieberman's note.  More cooperative.    10/18/2022 history  reviewed.  Patient awaiting a left hallux ray amputation by orthopedic surgery.  Tolerating vancomycin and cefepime.    10/19/2022 history reviewed.  The patient underwent a first toe ray resection on his left foot by Dr. Yeison Petty on 10/18/2022.  Continues on vancomycin and cefepime until 11/25/2022.  No high fevers or chills.    10/20/2022 history reviewed.  Status post left foot first ray toe resection on 10/18, with previous history of MRSA, and new infection with Raoultella ornithinolytica.  Tolerating vancomycin and ceftriaxone until 11/25/2022.  Waiting on placement.    10/24/2022 history reviewed.  No high fevers.  Status post left foot first ray toe resection on 10/18, with previous history of MRSA, and new infection with Raoultella ornithinolytica.  Continues on ceftriaxone and vancomycin until 11/25/2022.    10/25/2022 history reviewed.  Status post left foot first toe resection 10/18/2022 with cultures previously positive for MRSA, and now for Klebsiella and Raoultella.  Mental status has been a limiting factor with his psychiatric history in terms of treatment.  He is tolerating vancomycin and ceftriaxone to continue until 11/25/2022.  Placement is in progress.    10/26/2022 history reviewed.  Status post left first toe resection 10/18/2022 for osteomyelitis.  Tolerating ceftriaxone and vancomycin treating MRSA, Klebsiella, and other bacteria until 11/25/2022.  Awaiting placement, especially given his mental status and psychiatric history.  No high fever.  Pain controlled.    10/27/2022 history reviewed.  Continues on vancomycin and ceftriaxone until 11/25/2022 for left first toe osteomyelitis.  Status post resection 10/18/2022.  Awaiting placement.  No high fever.    10/31/2022 history reviewed.  Continues on antibiotics for left first toe osteomyelitis until 11/25.  Status post resection 10/18.  Confused off and on.  Awaiting placement.  No fever.    11/1/2022 history reviewed.  Tolerating  ceftriaxone and vancomycin until 11/25/2022 for left first toe osteomyelitis status post resection 10/18.  Pleasant but confused.  Awaiting placement.  No fever.  No pain.    11/2/2022 history reviewed.  On vancomycin and ceftriaxone until 11/25 for left toe osteomyelitis status post resection 10/18/2022.  Placement has been difficult as places or refusing him.  No fever.    Past Medical History:   Diagnosis Date   • Diabetes mellitus (HCC)    • GERD (gastroesophageal reflux disease)    HTN  Psychotic d/o, unspecified.    Past Surgical History:   Procedure Laterality Date   • AMPUTATION FOOT Left 10/18/2022    Procedure: PARTIAL FIRST RAY AMPUTATION LEFT;  Surgeon: Yeison Petty MD;  Location: [facility];  Service: Orthopedics;  Laterality: Left;   • EYE SURGERY     Left foot debridement/I and D x 2 9/27/22 and 9/30/22.  Left first ray amputation 10/18/2022.    Pediatric History   Patient Parents   • Not on file     Other Topics Concern   • Not on file   Social History Narrative    Caffeine 0-1 servings per day    Patient lives at home .   Patient [facility]  Former smoker, no alcohol, smokes marijuana.     family history includes Diabetes in his brother, brother, father, maternal grandfather, maternal grandmother, mother, and sister; Hypertension in his brother, brother, father, and mother.    No Known Allergies      There is no immunization history on file for this patient.    Medication:    Current Facility-Administered Medications:   •  acetaminophen (TYLENOL) tablet 650 mg, 650 mg, Oral, Q4H PRN, 650 mg at 10/28/22 2012 **OR** acetaminophen (TYLENOL) suppository 650 mg, 650 mg, Rectal, Q4H PRN, Yeison Petty MD  •  amLODIPine (NORVASC) tablet 10 mg, 10 mg, Oral, Q24H, Shahbaz Kuhn MD, 10 mg at 11/01/22 0825  •  bisacodyl (DULCOLAX) suppository 10 mg, 10 mg, Rectal, Daily PRN, Yeison Petty MD  •  cefTRIAXone (ROCEPHIN) 2 g/100 mL 0.9% NS IVPB (MBP), 2 g, Intravenous, Q24H, Evan  Rayray PENA MD, Last Rate: 200 mL/hr at 10/28/22 1109, 2 g at 22 1130  •  dextrose (D50W) (25 g/50 mL) IV injection 25 g, 25 g, Intravenous, Q15 Min PRN, Yeison Petty MD  •  dextrose (GLUTOSE) oral gel 15 g, 15 g, Oral, Q15 Min PRN, Yeison Petty MD  •  ferrous sulfate tablet 325 mg, 325 mg, Oral, Daily With Breakfast, Yeison Petty MD, 325 mg at 22 0825  •  glucagon (human recombinant) (GLUCAGEN DIAGNOSTIC) injection 1 mg, 1 mg, Intramuscular, Q15 Min PRN, Yeison Petty MD  •  heparin (porcine) 5000 UNIT/ML injection 5,000 Units, 5,000 Units, Subcutaneous, Q8H, Yeison Petty MD, 5,000 Units at 22 0535  •  insulin detemir (LEVEMIR) injection 10 Units, 10 Units, Subcutaneous, Nightly, Shahbaz Kuhn MD, 10 Units at 22 1958  •  Insulin Lispro (humaLOG) injection 0-7 Units, 0-7 Units, Subcutaneous, TID AC, Yeison Petty MD, 2 Units at 22 1213  •  Insulin Lispro (humaLOG) injection 8 Units, 8 Units, Subcutaneous, TID With Meals, Shahbaz Kuhn MD, 8 Units at 22 1726  •  lisinopril (PRINIVIL,ZESTRIL) tablet 40 mg, 40 mg, Oral, Daily, Shahbaz Kuhn MD, 40 mg at 22 0825  •  LORazepam (ATIVAN) tablet 0.25 mg, 0.25 mg, Oral, Q6H PRN, Nicole Henriquez DO, 0.25 mg at 22 1213  •  magnesium sulfate 4 gram infusion - Mg less than or equal to 1mg/dL, 4 g, Intravenous, PRN **OR** magnesium sulfate 3 gram infusion (1gm x 3) - Mg 1.1 - 1.5 mg/dL, 1 g, Intravenous, PRN **OR** Magnesium Sulfate 2 gram infusion- Mg 1.6 - 1.9 mg/dL, 2 g, Intravenous, PRN, Yeison Petty MD  •  melatonin tablet 5 mg, 5 mg, Oral, Nightly PRN, Yeison Petty MD, 5 mg at 22  •  [] HYDROmorphone (DILAUDID) injection 0.5 mg, 0.5 mg, Intravenous, Q2H PRN, 0.5 mg at 10/24/22 1449 **AND** naloxone (NARCAN) injection 0.1 mg, 0.1 mg, Intravenous, Q5 Min PRN, Yieson Petty MD  •  OLANZapine (zyPREXA) tablet 5 mg, 5 mg, Oral, Daily, 5 mg at 22 08 **AND** OLANZapine (zyPREXA)  "tablet 10 mg, 10 mg, Oral, Nightly, Nicole Henriquez DO, 10 mg at 11/01/22 1953  •  polyethylene glycol (MIRALAX) packet 17 g, 17 g, Oral, PRN, Yeison Petty MD, 17 g at 11/01/22 0825  •  sennosides-docusate (PERICOLACE) 8.6-50 MG per tablet 2 tablet, 2 tablet, Oral, Nightly, Yeison Petty MD, 2 tablet at 11/01/22 1953  •  Insert peripheral IV, , , Once **AND** sodium chloride 0.9 % flush 10 mL, 10 mL, Intravenous, PRNJovanny Jeremy, MD  •  sodium chloride 0.9 % flush 10 mL, 10 mL, Intravenous, Q12H, Yeison Petty MD, 10 mL at 10/30/22 1016  •  sodium chloride 0.9 % flush 10 mL, 10 mL, Intravenous, PRJovanny LU Jeremy, MD  •  sodium chloride 0.9 % flush 10 mL, 10 mL, Intravenous, Q12H, Nicole Henriquez DO, 10 mL at 11/02/22 0913  •  sodium chloride 0.9 % flush 10 mL, 10 mL, Intravenous, PRN, Nicole Henriquez DO  •  vancomycin in dextrose 5% 150 mL (VANCOCIN) IVPB 750 mg, 750 mg, Intravenous, Q12H, Carolina Arreaga, RPH, 750 mg at 11/02/22 0200  •  ziprasidone (GEODON) injection 20 mg, 20 mg, Intramuscular, Q4H PRN, Yeison Petyt MD, 20 mg at 10/27/22 0048    Please refer to the medical record for a full medication list    Review of Systems:  Unable to get a reliable history as patient is responding with yes and no to basic questions and has a psychotic disorder.  Denies fever, chills, nausea, vomiting, diarrhea, shortness of breath, dysuria, or rashes.    Physical Exam:   Vital Signs   Temp:  [98.2 °F (36.8 °C)-98.5 °F (36.9 °C)] 98.3 °F (36.8 °C)  Heart Rate:  [64-71] 71  Resp:  [17-18] 18  BP: (121-146)/(60-81) 146/81    Temp  Min: 98.2 °F (36.8 °C)  Max: 98.5 °F (36.9 °C)  BP  Min: 121/64  Max: 146/81  Pulse  Min: 64  Max: 71  Resp  Min: 17  Max: 18  SpO2  Min: 93 %  Max: 96 %    Blood pressure 146/81, pulse 71, temperature 98.3 °F (36.8 °C), temperature source Oral, resp. rate 18, height 182.9 cm (72\"), weight 86.6 kg (191 lb), SpO2 93 %.  GENERAL: Awake and alert, in minimal distress. Appears older than " stated age.  Resting in bed.    HEENT:  Normocephalic, atraumatic.  Oropharynx without thrush.   EYES: No conjunctival injection. No icterus. EOM full.  LYMPHATICS: No lymphadenopathy of the neck or axillary or inguinal regions.   HEART: No murmur, gallop, or pericardial friction rub. Reg rate rhythm.  LUNGS: Clear to auscultation and percussion. No respiratory distress, no use of accessory muscles.  No wheezes.  ABDOMEN: Soft, nontender, nondistended. No appreciable HSM.  Bowel sounds normal.  SKIN: Warm and dry without cutaneous eruptions.  No nodules. Left foot dressing in place, right arm PICC okay placed 10/27.  Splint in place.  PSYCHIATRIC: Mental status with some confusion.  But overall pleasant.  EXT: Left foot surgical dressing in place.  No crepitus, some drainage from webspace. Normal range of motion.  NEURO: Oriented to name, nonfocal.  Follows some commands.    Results Review:   I reviewed the patient's new clinical results.  I reviewed the patient's new imaging results and agree with the interpretation.  I reviewed the patient's other test results and agree with the interpretation    Results from last 7 days   Lab Units 11/02/22  0844 10/31/22  0535   WBC 10*3/mm3 6.07 9.55   HEMOGLOBIN g/dL 7.7* 7.6*   HEMATOCRIT % 25.4* 24.5*   PLATELETS 10*3/mm3 296 259     Results from last 7 days   Lab Units 11/02/22  0844   SODIUM mmol/L 135*   POTASSIUM mmol/L 4.1   CHLORIDE mmol/L 101   CO2 mmol/L 24.0   BUN mg/dL 22   CREATININE mg/dL 1.00   GLUCOSE mg/dL 174*   CALCIUM mg/dL 9.0     Results from last 7 days   Lab Units 10/31/22  0535   ALK PHOS U/L 106   BILIRUBIN mg/dL <0.2   ALT (SGPT) U/L 16   AST (SGOT) U/L 11             Results from last 7 days   Lab Units 10/31/22  1250 10/27/22  1106   VANCOMYCIN RM mcg/mL 21.20 18.70         Estimated Creatinine Clearance: 90.2 mL/min (by C-G formula based on SCr of 1 mg/dL).  CPK    Common Labsle 10/19/22   Creatine Kinase 119            Procalitonin Results:        Brief Urine Lab Results  (Last result in the past 365 days)      Color   Clarity   Blood   Leuk Est   Nitrite   Protein   CREAT   Urine HCG        06/12/22 2056 Yellow   Clear     Negative                    No results found for: SITE, ALLENTEST, PHART, JVH3IBX, PO2ART, EGU4NSG, BASEEXCESS, Y8ODABLV, HGBBG, HCTABG, OXYHEMOGLOBI, METHHGBN, CARBOXYHGB, CO2CT, BAROMETRIC, MODALITY, FIO2     Microbiology:  Microbiology Results (last 10 days)     Procedure Component Value - Date/Time    COVID PRE-OP / PRE-PROCEDURE SCREENING ORDER (NO ISOLATION) - Swab, Nasopharynx [[id number]]  (Normal) Collected: 10/27/22 0510    Lab Status: Final result Specimen: Swab from Nasopharynx Updated: 10/27/22 0530    Narrative:      The following orders were created for panel order COVID PRE-OP / PRE-PROCEDURE SCREENING ORDER (NO ISOLATION) - Swab, Nasopharynx.  Procedure                               Abnormality         Status                     ---------                               -----------         ------                     COVID-19, [facility] IN-HOUS...[[id number]]  Normal              Final result                 Please view results for these tests on the individual orders.    COVID-19, [facility] IN-HOUSE,NASAL Swab (NO TRANSPORT MEDIA) 2 HR TAT - Swab, Nasopharynx [[id number]]  (Normal) Collected: 10/27/22 0510    Lab Status: Final result Specimen: Swab from Nasopharynx Updated: 10/27/22 0530     COVID19 Presumptive Negative    Narrative:      Fact sheet for providers: https://www.fda.gov/media/809167/download     Fact sheet for patients: https://www.fda.gov/media/637799/download    Test performed by PCR.  If inconsistent with clinical signs and symptoms patient should be tested with different authorized molecular test.        Blood and wound cultures 10/14 pending.       No results found for: TISSCXQ, CULTURES, CULTURE, BLOODCX, ANACX, BALCX, ACIDFASTCX, BODYFLDCX, CXREFLEX, FUNGUSCX, RESPCX, MRSACX, ROUTCX, BRCHWSHCLT, TISSUECX,  URINECX, CMVCX, WOUNDCX, BCIDPCR, BFCULTURE, BLOODCULT(      Radiology:  Imaging Results (Last 72 Hours)     ** No results found for the last 72 hours. **          IMPRESSION:   1. Left hallux osteomyelitis after trauma/displaced fracture 9/17.  At risk for fracture not healing in the first toe given the infection, noncompliance with walking on foot, and ongoing infection.  Likely gangrene and poor wound healing related to vascular disease.  Making progress after surgery 10/18.  2. 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  Cx with MRSA, Enterococcus faecalis, and Corynebacterium striatum.  Initially treated with Daptomycin and discharge on oral Zyvox and oral Rifampin until 10/26/22.  Cx with Raoultella ornithinolytica (sens to Cefepime, ceftriaxone) and Klebsiella.  Status post left first ray resection 10/18/2022.  Healing.  3. Lactic acidosis, related to above. Resolved.   4. Elevated inflammatory markers, related to above.  CRP 3.07 on 10/14.  5. Type 2 diabetes mellitus/peripheral neuropathy.  6. Hyperglycemia, related to above.  7. Anemia of chronic disease.  Continues.  8. Essential hypertension.  9. Gastroesophageal reflux disease.  10. Thrombocytosis.  Related to above issues.  Resolved.  11. Psychosis since April 2022 reported by the patient's sister, thought to be related to drug and alcohol use.  Was hospitalized previously at [facility].  Awaiting placement.  12. Hyponatremia, 135.  13. H/o MRSA.  14. Hypocalcemia resolved.  15. Elevated alkaline phosphatase resolved.    Moving forward on current regimen.  Discussed with nursing and family.    Plan:  1. Diagnostically, follow blood and wound cultures, imaging, physical examination, CBC, CMP, CRP, and vancomycin levels.   2. Therapeutically, continue ceftriaxone and Vancomycin for 6 weeks of IV antibiotics until 11/25/22.  This will cover the organisms including previous MRSA.  Vancomycin dose  previously increased to 1 g IV every 12 hours.  3. Orthopedic surgery status post left first ray resection for osteomyelitis 10/18/2022.   4. Awaiting placement.  5. Continue supportive care.  PICC placement right arm 10/27/2022.    I discussed the patient's findings and my recommendations with patient and his family.  He will need to be in a skilled facility to receive his antibiotics.    Our group would be pleased to follow this patient over the course of their hospitalization and assist with outpatient antimicrobial therapy, as indicated.  Further recommendations depend on the results of the cultures and clinical course. Discussed with patient's sister and family.  Difficult issue with compliance in terms of wound care and staying off his foot.    Case management orders: Please arrange for skilled facility IV antibiotics with ceftriaxone 2 g IV daily, vancomycin 1 g IV every 12 hours to continue until 11/25/2022.  Pharmacy to adjust dose of vancomycin based on weekly trough levels to try to maintain level between 12 and 18.  Check CBC, CMP, CRP, vancomycin trough weekly while on IV antibiotics.  Fax orders to [phone], call [phone] with final arrangements.  The treatment is for left first toe osteomyelitis with MRSA, Klebsiella, and Raoultella ornithinolytica.  Arrange for follow-up with me in 2 weeks post discharge.  Weekly PICC dressing changes.    Rayray Gordon MD  11/2/2022

## 2022-11-03 LAB
GLUCOSE BLDC GLUCOMTR-MCNC: 138 MG/DL (ref 70–130)
GLUCOSE BLDC GLUCOMTR-MCNC: 157 MG/DL (ref 70–130)
GLUCOSE BLDC GLUCOMTR-MCNC: 198 MG/DL (ref 70–130)

## 2022-11-03 PROCEDURE — 82962 GLUCOSE BLOOD TEST: CPT

## 2022-11-03 PROCEDURE — 63710000001 INSULIN LISPRO (HUMAN) PER 5 UNITS: Performed by: ORTHOPAEDIC SURGERY

## 2022-11-03 PROCEDURE — 63710000001 INSULIN DETEMIR PER 5 UNITS: Performed by: INTERNAL MEDICINE

## 2022-11-03 PROCEDURE — 99232 SBSQ HOSP IP/OBS MODERATE 35: CPT | Performed by: HOSPITALIST

## 2022-11-03 PROCEDURE — 25010000002 CEFTRIAXONE PER 250 MG: Performed by: INTERNAL MEDICINE

## 2022-11-03 PROCEDURE — 25010000002 VANCOMYCIN PER 500 MG

## 2022-11-03 PROCEDURE — 25010000002 HEPARIN (PORCINE) PER 1000 UNITS: Performed by: ORTHOPAEDIC SURGERY

## 2022-11-03 PROCEDURE — 63710000001 INSULIN LISPRO (HUMAN) PER 5 UNITS: Performed by: INTERNAL MEDICINE

## 2022-11-03 RX ADMIN — FERROUS SULFATE TAB 325 MG (65 MG ELEMENTAL FE) 325 MG: 325 (65 FE) TAB at 09:05

## 2022-11-03 RX ADMIN — INSULIN DETEMIR 10 UNITS: 100 INJECTION, SOLUTION SUBCUTANEOUS at 21:54

## 2022-11-03 RX ADMIN — AMLODIPINE BESYLATE 10 MG: 10 TABLET ORAL at 09:05

## 2022-11-03 RX ADMIN — Medication 10 ML: at 09:07

## 2022-11-03 RX ADMIN — VANCOMYCIN HYDROCHLORIDE 750 MG: 750 INJECTION, SOLUTION INTRAVENOUS at 03:37

## 2022-11-03 RX ADMIN — Medication 10 ML: at 21:55

## 2022-11-03 RX ADMIN — HEPARIN SODIUM 5000 UNITS: 5000 INJECTION INTRAVENOUS; SUBCUTANEOUS at 14:18

## 2022-11-03 RX ADMIN — INSULIN LISPRO 8 UNITS: 100 INJECTION, SOLUTION INTRAVENOUS; SUBCUTANEOUS at 17:33

## 2022-11-03 RX ADMIN — HEPARIN SODIUM 5000 UNITS: 5000 INJECTION INTRAVENOUS; SUBCUTANEOUS at 21:54

## 2022-11-03 RX ADMIN — VANCOMYCIN HYDROCHLORIDE 750 MG: 750 INJECTION, SOLUTION INTRAVENOUS at 14:18

## 2022-11-03 RX ADMIN — OLANZAPINE 10 MG: 5 TABLET, FILM COATED ORAL at 21:54

## 2022-11-03 RX ADMIN — INSULIN LISPRO 2 UNITS: 100 INJECTION, SOLUTION INTRAVENOUS; SUBCUTANEOUS at 11:38

## 2022-11-03 RX ADMIN — SODIUM CHLORIDE 2 G: 900 INJECTION INTRAVENOUS at 11:38

## 2022-11-03 RX ADMIN — HEPARIN SODIUM 5000 UNITS: 5000 INJECTION INTRAVENOUS; SUBCUTANEOUS at 05:33

## 2022-11-03 RX ADMIN — OLANZAPINE 5 MG: 5 TABLET, FILM COATED ORAL at 09:05

## 2022-11-03 RX ADMIN — LISINOPRIL 40 MG: 40 TABLET ORAL at 09:05

## 2022-11-03 RX ADMIN — INSULIN LISPRO 2 UNITS: 100 INJECTION, SOLUTION INTRAVENOUS; SUBCUTANEOUS at 09:09

## 2022-11-03 RX ADMIN — INSULIN LISPRO 8 UNITS: 100 INJECTION, SOLUTION INTRAVENOUS; SUBCUTANEOUS at 09:06

## 2022-11-03 RX ADMIN — INSULIN LISPRO 8 UNITS: 100 INJECTION, SOLUTION INTRAVENOUS; SUBCUTANEOUS at 11:38

## 2022-11-03 NOTE — PROGRESS NOTES
INFECTIOUS DISEASE Progress Note    Omkar Nava  1955  [id number]    Consult: 10/15/22  Admit date: 10/14/2022    Requesting Provider: Omkar Seth MD  Evaluating physician:  Rayray Gordon MD  Reason for Consultation: left foot osteomyelitis, first toe, distal phalanx  Chief Complaint: left foot pain      Subjective   History of present illness:  Patient is a  67 y.o. male with h/o T2DM, PN, HTN, normocytic anemia, and GERD who presented to [facility] ED on 10/14 for worsening left foot pain.  The patient is a poor historian and most of the information was taken from the chart.  He tripped at his nursing facility, [facility], while running the street in flip-flops and sustained a laceration of his first webspace.  He was taken to [facility] ED on 9/17/22 and found to have a small intra-articular vertical fracture through the base the the great toe proximal phalanx. Podiatry, Dr. Ruiz, saw patient and the wound with irrigated with suture placement.  The area was dressed and he was placed in a surgical shoe.  He was given Cefazolin x 1 dose and discharged back to his facility on Keflex for 7 days.  On 9/26/22, the nursing staff at increased pain, swelling, and redness around the foot and sent him to [facility] ED on that day.  He was found to have a displaced fracture of proximal phalanx of left hallux along with abscess and gas in tissues.  He was admitted to the hospital and underwent Left foot debridement and I and D on 9/27/22 by Dr. Ruiz with culture positive for MRSA (Resistant to Cefazolin, clindamycin, erm, oxacillin, tetracycline, Bactrim and sensitive to Vancomycin, Daptomycin-KB 1, and Linezolid KB 2), Corynebacterium striatum, and Enterococcus faecalis (sens to Vanc and ampicillin).  He underwent a second debridement and I and D on 9/30.  He was given Cefazolin in ED and then changed to Daptomycin.  A PICC line was placed on 10/3 for Daptomycin infusions,  but the antibiotic was too expensive for the SNF, so he was changed to oral Zyvox and Rifampin until 10/26.  The PICC line was removed on 10/4.  His blood cultures remained negative.  He was discharged back to his nursing facility on 10/4/22.      He was sent to [facility] ED on 10/14 after nursing staff noted that his left hallux appeared necrotic.  He has had no fever, chills, shortness of breath, nausea, vomiting, diarrhea, dysuria, or worsening foot pain.  On arrival, he is afebrile and hemodynamically stable.  On 10/15, his BP went up to 203/81.  Admitting labs were WBC 7200 with 69% neutrophils, ESR 67, CRP 3.07, Hgb 9.2, PCT 0.05, lactic acid 2.3, and creatinine 1.21.  A MRSA PCR was negative.  Blood and wound cultures are pending.  An MRI of the left foot on 10/15 showed wound around the left great toe with marrow signal alteration within the distal aspect of 1st MT as well as both phalanges c/w osteomyelitis and scattered foci in soft tissues that may be foci of gas without evidence of fluid collections or abscesses. An Xray of left foot showed soft tissue swelling of 1st digit with displaced fracture of medial base of 1st proximal phalanx.  He is currently on Cefepime and Vancomycin.  ID was asked by Dr. Seth on 10/15 to evaluate and manage his antibiotic therapy.  Patient has had some issues with psychosis over the past 6 months possibly related to drug and alcohol use.  This is also interfered with his acceptance of care.    10/16/2022 history reviewed.  Patient more pleasant this morning.  No high fevers or chills.  Tolerating vancomycin and cefepime, duration to be determined.  Orthopedics following.  Previous history of MRSA.    10/17/22: history reviewed.  Patient with minimal response. Tmax 99.6.  On Vancomycin and Cefepime for left hallux osteomyelitis and surrounding infection with duration to be determined.  Awaiting evaluate by Dr. Petty as per Dr. Lieberman's note.  More cooperative.    10/18/2022 history  reviewed.  Patient awaiting a left hallux ray amputation by orthopedic surgery.  Tolerating vancomycin and cefepime.    10/19/2022 history reviewed.  The patient underwent a first toe ray resection on his left foot by Dr. Yeison Petty on 10/18/2022.  Continues on vancomycin and cefepime until 11/25/2022.  No high fevers or chills.    10/20/2022 history reviewed.  Status post left foot first ray toe resection on 10/18, with previous history of MRSA, and new infection with Raoultella ornithinolytica.  Tolerating vancomycin and ceftriaxone until 11/25/2022.  Waiting on placement.    10/24/2022 history reviewed.  No high fevers.  Status post left foot first ray toe resection on 10/18, with previous history of MRSA, and new infection with Raoultella ornithinolytica.  Continues on ceftriaxone and vancomycin until 11/25/2022.    10/25/2022 history reviewed.  Status post left foot first toe resection 10/18/2022 with cultures previously positive for MRSA, and now for Klebsiella and Raoultella.  Mental status has been a limiting factor with his psychiatric history in terms of treatment.  He is tolerating vancomycin and ceftriaxone to continue until 11/25/2022.  Placement is in progress.    10/26/2022 history reviewed.  Status post left first toe resection 10/18/2022 for osteomyelitis.  Tolerating ceftriaxone and vancomycin treating MRSA, Klebsiella, and other bacteria until 11/25/2022.  Awaiting placement, especially given his mental status and psychiatric history.  No high fever.  Pain controlled.    10/27/2022 history reviewed.  Continues on vancomycin and ceftriaxone until 11/25/2022 for left first toe osteomyelitis.  Status post resection 10/18/2022.  Awaiting placement.  No high fever.    10/31/2022 history reviewed.  Continues on antibiotics for left first toe osteomyelitis until 11/25.  Status post resection 10/18.  Confused off and on.  Awaiting placement.  No fever.    11/1/2022 history reviewed.  Tolerating  ceftriaxone and vancomycin until 11/25/2022 for left first toe osteomyelitis status post resection 10/18.  Pleasant but confused.  Awaiting placement.  No fever.  No pain.    11/2/2022 history reviewed.  On vancomycin and ceftriaxone until 11/25 for left toe osteomyelitis status post resection 10/18/2022.  Placement has been difficult as places or refusing him.  No fever.    11/3/2022 history reviewed.  Tolerating ceftriaxone and vancomycin until 11/25/2022 for left first toe osteomyelitis polymicrobial status post resection 10/18/2022.  Still a difficult placement issue.  No fever.    Past Medical History:   Diagnosis Date   • Diabetes mellitus (HCC)    • GERD (gastroesophageal reflux disease)    HTN  Psychotic d/o, unspecified.    Past Surgical History:   Procedure Laterality Date   • AMPUTATION FOOT Left 10/18/2022    Procedure: PARTIAL FIRST RAY AMPUTATION LEFT;  Surgeon: Yeison Petty MD;  Location: [facility];  Service: Orthopedics;  Laterality: Left;   • EYE SURGERY     Left foot debridement/I and D x 2 9/27/22 and 9/30/22.  Left first ray amputation 10/18/2022.    Pediatric History   Patient Parents   • Not on file     Other Topics Concern   • Not on file   Social History Narrative    Caffeine 0-1 servings per day    Patient lives at home .   Patient lives at [facility]  Former smoker, no alcohol, smokes marijuana.     family history includes Diabetes in his brother, brother, father, maternal grandfather, maternal grandmother, mother, and sister; Hypertension in his brother, brother, father, and mother.    No Known Allergies      There is no immunization history on file for this patient.    Medication:    Current Facility-Administered Medications:   •  acetaminophen (TYLENOL) tablet 650 mg, 650 mg, Oral, Q4H PRN, 650 mg at 10/28/22 2012 **OR** acetaminophen (TYLENOL) suppository 650 mg, 650 mg, Rectal, Q4H PRN, Yeison Petty MD  •  amLODIPine (NORVASC) tablet 10 mg, 10 mg, Oral, Q24H, Bam  MD Shahbaz, 10 mg at 22 0905  •  bisacodyl (DULCOLAX) suppository 10 mg, 10 mg, Rectal, Daily PRN, Yeison Petty MD  •  cefTRIAXone (ROCEPHIN) 2 g/100 mL 0.9% NS IVPB (MBP), 2 g, Intravenous, Q24H, Rayray Gordon MD, Last Rate: 200 mL/hr at 10/28/22 1109, 2 g at 22 1138  •  dextrose (D50W) (25 g/50 mL) IV injection 25 g, 25 g, Intravenous, Q15 Min PRN, Yeison Petty MD  •  dextrose (GLUTOSE) oral gel 15 g, 15 g, Oral, Q15 Min PRN, Yeison Petty MD  •  ferrous sulfate tablet 325 mg, 325 mg, Oral, Daily With Breakfast, Yeison Petty MD, 325 mg at 22  •  glucagon (human recombinant) (GLUCAGEN DIAGNOSTIC) injection 1 mg, 1 mg, Intramuscular, Q15 Min PRN, Yeison Petty MD  •  heparin (porcine) 5000 UNIT/ML injection 5,000 Units, 5,000 Units, Subcutaneous, Q8H, Yeison Petty MD, 5,000 Units at 22 0533  •  insulin detemir (LEVEMIR) injection 10 Units, 10 Units, Subcutaneous, Nightly, Shahbaz Kuhn MD, 10 Units at 22  •  Insulin Lispro (humaLOG) injection 0-7 Units, 0-7 Units, Subcutaneous, TID AC, Yeison Petty MD, 2 Units at 22 1138  •  Insulin Lispro (humaLOG) injection 8 Units, 8 Units, Subcutaneous, TID With Meals, Shahbaz Kuhn MD, 8 Units at 22 1138  •  lisinopril (PRINIVIL,ZESTRIL) tablet 40 mg, 40 mg, Oral, Daily, Shahbaz Kuhn MD, 40 mg at 22 09  •  magnesium sulfate 4 gram infusion - Mg less than or equal to 1mg/dL, 4 g, Intravenous, PRN **OR** magnesium sulfate 3 gram infusion (1gm x 3) - Mg 1.1 - 1.5 mg/dL, 1 g, Intravenous, PRN **OR** Magnesium Sulfate 2 gram infusion- Mg 1.6 - 1.9 mg/dL, 2 g, Intravenous, PRN, Yeison Petty MD  •  melatonin tablet 5 mg, 5 mg, Oral, Nightly PRN, Yeison Petty MD, 5 mg at 22 1953  •  [] HYDROmorphone (DILAUDID) injection 0.5 mg, 0.5 mg, Intravenous, Q2H PRN, 0.5 mg at 10/24/22 1449 **AND** naloxone (NARCAN) injection 0.1 mg, 0.1 mg, Intravenous, Q5 Min PRN, Yeison Petty MD  •   "OLANZapine (zyPREXA) tablet 5 mg, 5 mg, Oral, Daily, 5 mg at 11/03/22 0905 **AND** OLANZapine (zyPREXA) tablet 10 mg, 10 mg, Oral, Nightly, Nicole Henriquez DO, 10 mg at 11/02/22 2018  •  polyethylene glycol (MIRALAX) packet 17 g, 17 g, Oral, PRN, Yeison Petty MD, 17 g at 11/01/22 0825  •  sennosides-docusate (PERICOLACE) 8.6-50 MG per tablet 2 tablet, 2 tablet, Oral, Nightly, Yeison Petty MD, 2 tablet at 11/02/22 2019  •  Insert peripheral IV, , , Once **AND** sodium chloride 0.9 % flush 10 mL, 10 mL, Intravenous, PRN, Yeison Petty MD  •  sodium chloride 0.9 % flush 10 mL, 10 mL, Intravenous, Q12H, Nicole Henriquez DO, 10 mL at 11/03/22 0907  •  vancomycin in dextrose 5% 150 mL (VANCOCIN) IVPB 750 mg, 750 mg, Intravenous, Q12H, Carolina Arreaga RPH, 750 mg at 11/03/22 0337    Please refer to the medical record for a full medication list    Review of Systems:  Unable to get a reliable history as patient is responding with yes and no to basic questions and has a psychotic disorder.  Denies fever, chills, nausea, vomiting, diarrhea, shortness of breath, dysuria, or rashes.    Physical Exam:   Vital Signs   Temp:  [97.9 °F (36.6 °C)-98.6 °F (37 °C)] 98.6 °F (37 °C)  Heart Rate:  [71-78] 72  Resp:  [16-20] 16  BP: (154-164)/(63-80) 158/80    Temp  Min: 97.9 °F (36.6 °C)  Max: 98.6 °F (37 °C)  BP  Min: 154/78  Max: 164/63  Pulse  Min: 71  Max: 78  Resp  Min: 16  Max: 20  SpO2  Min: 90 %  Max: 96 %    Blood pressure 158/80, pulse 72, temperature 98.6 °F (37 °C), temperature source Oral, resp. rate 16, height 182.9 cm (72\"), weight 86.6 kg (191 lb), SpO2 94 %.  GENERAL: Awake and alert, in minimal distress. Appears older than stated age.  Resting in bed.    HEENT:  Normocephalic, atraumatic.  Oropharynx without thrush.   EYES: No conjunctival injection. No icterus. EOM full.  LYMPHATICS: No lymphadenopathy of the neck or axillary or inguinal regions.   HEART: No murmur, gallop, or pericardial friction rub. Reg rate " rhythm.  LUNGS: Clear to auscultation and percussion. No respiratory distress, no use of accessory muscles.  No wheezes.  ABDOMEN: Soft, nontender, nondistended. No appreciable HSM.  Bowel sounds normal.  SKIN: Warm and dry without cutaneous eruptions.  No nodules. Left foot dressing in place, right arm PICC okay placed 10/27.  Splint in place.  PSYCHIATRIC: Mental status with occasional confusion.  But overall pleasant.  Can follow commands at times.  EXT: Left foot surgical dressing in place.  No crepitus, some drainage from webspace. Normal range of motion.  NEURO: Oriented to name, nonfocal.  Follows some commands.    Results Review:   I reviewed the patient's new clinical results.  I reviewed the patient's new imaging results and agree with the interpretation.  I reviewed the patient's other test results and agree with the interpretation    Results from last 7 days   Lab Units 11/02/22  0844 10/31/22  0535   WBC 10*3/mm3 6.07 9.55   HEMOGLOBIN g/dL 7.7* 7.6*   HEMATOCRIT % 25.4* 24.5*   PLATELETS 10*3/mm3 296 259     Results from last 7 days   Lab Units 11/02/22  0844   SODIUM mmol/L 135*   POTASSIUM mmol/L 4.1   CHLORIDE mmol/L 101   CO2 mmol/L 24.0   BUN mg/dL 22   CREATININE mg/dL 1.00   GLUCOSE mg/dL 174*   CALCIUM mg/dL 9.0     Results from last 7 days   Lab Units 10/31/22  0535   ALK PHOS U/L 106   BILIRUBIN mg/dL <0.2   ALT (SGPT) U/L 16   AST (SGOT) U/L 11             Results from last 7 days   Lab Units 10/31/22  1250   VANCOMYCIN RM mcg/mL 21.20         Estimated Creatinine Clearance: 90.2 mL/min (by C-G formula based on SCr of 1 mg/dL).  CPK    Common Labsle 10/19/22   Creatine Kinase 119            Procalitonin Results:       Brief Urine Lab Results  (Last result in the past 365 days)      Color   Clarity   Blood   Leuk Est   Nitrite   Protein   CREAT   Urine HCG        06/12/22 2056 Yellow   Clear     Negative                    No results found for: SITE, ALLENTEST, PHART, KYM9UUP, PO2ART,  QFN3QNX, BASEEXCESS, T3QFPZGX, HGBBG, HCTABG, OXYHEMOGLOBI, METHHGBN, CARBOXYHGB, CO2CT, BAROMETRIC, MODALITY, FIO2     Microbiology:  Microbiology Results (last 10 days)     Procedure Component Value - Date/Time    COVID PRE-OP / PRE-PROCEDURE SCREENING ORDER (NO ISOLATION) - Swab, Nasopharynx [[id number]]  (Normal) Collected: 10/27/22 0510    Lab Status: Final result Specimen: Swab from Nasopharynx Updated: 10/27/22 0530    Narrative:      The following orders were created for panel order COVID PRE-OP / PRE-PROCEDURE SCREENING ORDER (NO ISOLATION) - Swab, Nasopharynx.  Procedure                               Abnormality         Status                     ---------                               -----------         ------                     COVID-19, [facility] IN-HOUS...[[id number]]  Normal              Final result                 Please view results for these tests on the individual orders.    COVID-19, [facility] IN-HOUSE,NASAL Swab (NO TRANSPORT MEDIA) 2 HR TAT - Swab, Nasopharynx [[id number]]  (Normal) Collected: 10/27/22 0510    Lab Status: Final result Specimen: Swab from Nasopharynx Updated: 10/27/22 0530     COVID19 Presumptive Negative    Narrative:      Fact sheet for providers: https://www.fda.gov/media/802347/download     Fact sheet for patients: https://www.fda.gov/media/512168/download    Test performed by PCR.  If inconsistent with clinical signs and symptoms patient should be tested with different authorized molecular test.        Blood and wound cultures 10/14 pending.       No results found for: TISSCXQ, CULTURES, CULTURE, BLOODCX, ANACX, BALCX, ACIDFASTCX, BODYFLDCX, CXREFLEX, FUNGUSCX, RESPCX, MRSACX, ROUTCX, BRCHWSHCLT, TISSUECX, URINECX, CMVCX, WOUNDCX, BCIDPCR, BFCULTURE, BLOODCULT(      Radiology:  Imaging Results (Last 72 Hours)     ** No results found for the last 72 hours. **          IMPRESSION:   1. Left hallux osteomyelitis after trauma/displaced fracture 9/17.  At risk for fracture not healing  in the first toe given the infection, noncompliance with walking on foot, and ongoing infection.  Likely gangrene and poor wound healing related to vascular disease.  Making progress after surgery 10/18.  2. 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  Cx with MRSA, Enterococcus faecalis, and Corynebacterium striatum.  Initially treated with Daptomycin and discharge on oral Zyvox and oral Rifampin until 10/26/22.  Cx with Raoultella ornithinolytica (sens to Cefepime, ceftriaxone) and Klebsiella.  Status post left first ray resection 10/18/2022.  Healing.  3. Lactic acidosis, related to above. Resolved.   4. Elevated inflammatory markers, related to above.  CRP 3.07 on 10/14.  CRP 3.10 on 10/26.  5. Type 2 diabetes mellitus/peripheral neuropathy.  6. Hyperglycemia, related to above.  7. Anemia of chronic disease.  Continues.  8. Essential hypertension.  9. Gastroesophageal reflux disease.  10. Thrombocytosis.  Related to above issues.  Resolved.  11. Psychosis since April 2022 reported by the patient's sister, thought to be related to drug and alcohol use.  Was hospitalized previously at [facility].  Awaiting placement.  12. Hyponatremia, 135.  Continues.  13. H/o MRSA.  14. Hypocalcemia resolved.  15. Elevated alkaline phosphatase resolved.    Tolerating current regimen.  Discussed with nursing and family.  Main issue is finding placement at this point.    Plan:  1. Diagnostically, follow blood and wound cultures, imaging, physical examination, CBC, CMP, CRP, and vancomycin levels.   2. Therapeutically, continue ceftriaxone and Vancomycin for 6 weeks of IV antibiotics until 11/25/22.  This will cover the organisms including previous MRSA.  Vancomycin dose previously increased to 1 g IV every 12 hours.  3. Orthopedic surgery status post left first ray resection for osteomyelitis 10/18/2022.   4. Awaiting placement.  5. Continue supportive care.  PICC  placement right arm 10/27/2022.    I discussed the patient's findings and my recommendations with patient and his family.  He will need to be in a skilled facility to receive his antibiotics.    Our group would be pleased to follow this patient over the course of their hospitalization and assist with outpatient antimicrobial therapy, as indicated.  Further recommendations depend on the results of the cultures and clinical course. Discussed with patient's sister and family.  Difficult issue with compliance in terms of wound care and staying off his foot.    Case management orders: Please arrange for skilled facility IV antibiotics with ceftriaxone 2 g IV daily, vancomycin 1 g IV every 12 hours to continue until 11/25/2022.  Pharmacy to adjust dose of vancomycin based on weekly trough levels to try to maintain level between 12 and 18.  Check CBC, CMP, CRP, vancomycin trough weekly while on IV antibiotics.  Fax orders to [phone], call [phone] with final arrangements.  The treatment is for left first toe osteomyelitis with MRSA, Klebsiella, and Raoultella ornithinolytica.  Arrange for follow-up with me in 2 weeks post discharge.  Weekly PICC dressing changes.    Rayray Gordon MD  11/3/2022

## 2022-11-03 NOTE — PLAN OF CARE
Goal Outcome Evaluation:                 Problem: Fall Injury Risk  Goal: Absence of Fall and Fall-Related Injury  Intervention: Identify and Manage Contributors  Recent Flowsheet Documentation  Taken 11/3/2022 0400 by Melvin Suárez RN  Medication Review/Management: medications reviewed  Taken 11/3/2022 0200 by Melvin Suárez RN  Medication Review/Management: medications reviewed  Taken 11/3/2022 0000 by Melvin Suárez RN  Medication Review/Management: medications reviewed  Taken 11/2/2022 2200 by Melvin Suárez RN  Medication Review/Management: medications reviewed  Taken 11/2/2022 2000 by Melvin Suárez RN  Medication Review/Management: medications reviewed  Intervention: Promote Injury-Free Environment  Recent Flowsheet Documentation  Taken 11/3/2022 0400 by Melvin Suárez RN  Safety Promotion/Fall Prevention:   activity supervised   safety round/check completed   toileting scheduled  Taken 11/3/2022 0200 by Melvin Suárez RN  Safety Promotion/Fall Prevention:   activity supervised   safety round/check completed   toileting scheduled  Taken 11/3/2022 0000 by Melvin Suárez RN  Safety Promotion/Fall Prevention:   activity supervised   safety round/check completed   toileting scheduled  Taken 11/2/2022 2200 by Melvin Suárez RN  Safety Promotion/Fall Prevention:   activity supervised   safety round/check completed   toileting scheduled  Taken 11/2/2022 2000 by Melvin Suárez RN  Safety Promotion/Fall Prevention:   activity supervised   safety round/check completed   toileting scheduled     Problem: Skin Injury Risk Increased  Goal: Skin Health and Integrity  Intervention: Optimize Skin Protection  Recent Flowsheet Documentation  Taken 11/3/2022 0400 by Melvin Suárez RN  Pressure Reduction Techniques: frequent weight shift encouraged  Head of Bed (HOB) Positioning: HOB at 30-45 degrees  Pressure Reduction Devices: positioning supports utilized  Skin Protection: adhesive use limited  Taken  11/3/2022 0200 by Melvin Suárez RN  Pressure Reduction Techniques: frequent weight shift encouraged  Head of Bed (HOB) Positioning: HOB at 30-45 degrees  Pressure Reduction Devices: positioning supports utilized  Skin Protection: adhesive use limited  Taken 11/3/2022 0000 by Melvin Suárez RN  Pressure Reduction Techniques: frequent weight shift encouraged  Head of Bed (HOB) Positioning: HOB at 30-45 degrees  Pressure Reduction Devices: positioning supports utilized  Skin Protection: adhesive use limited  Taken 11/2/2022 2200 by Melvin Suárez RN  Pressure Reduction Techniques: frequent weight shift encouraged  Head of Bed (HOB) Positioning: HOB at 30-45 degrees  Pressure Reduction Devices: positioning supports utilized  Skin Protection: adhesive use limited  Taken 11/2/2022 2000 by Melvin Suárez RN  Pressure Reduction Techniques: frequent weight shift encouraged  Head of Bed (HOB) Positioning: HOB at 30-45 degrees  Pressure Reduction Devices: positioning supports utilized  Skin Protection: adhesive use limited     Problem: Adult Inpatient Plan of Care  Goal: Absence of Hospital-Acquired Illness or Injury  Intervention: Identify and Manage Fall Risk  Recent Flowsheet Documentation  Taken 11/3/2022 0400 by Melvin Suárez RN  Safety Promotion/Fall Prevention:   activity supervised   safety round/check completed   toileting scheduled  Taken 11/3/2022 0200 by Melvin Suárez RN  Safety Promotion/Fall Prevention:   activity supervised   safety round/check completed   toileting scheduled  Taken 11/3/2022 0000 by Melvin Suárez RN  Safety Promotion/Fall Prevention:   activity supervised   safety round/check completed   toileting scheduled  Taken 11/2/2022 2200 by Melvin Suárez RN  Safety Promotion/Fall Prevention:   activity supervised   safety round/check completed   toileting scheduled  Taken 11/2/2022 2000 by Melvin Suárez RN  Safety Promotion/Fall Prevention:   activity supervised   safety  round/check completed   toileting scheduled  Intervention: Prevent Skin Injury  Recent Flowsheet Documentation  Taken 11/3/2022 0400 by Melvin Suárez RN  Body Position: supine  Skin Protection: adhesive use limited  Taken 11/3/2022 0200 by Melvin Suárez RN  Body Position: supine  Skin Protection: adhesive use limited  Taken 11/3/2022 0000 by Melvin Suárez RN  Body Position: supine  Skin Protection: adhesive use limited  Taken 11/2/2022 2200 by Melvin Suárez RN  Body Position: supine  Skin Protection: adhesive use limited  Taken 11/2/2022 2000 by Melvin Suárez RN  Body Position: supine  Skin Protection: adhesive use limited  Intervention: Prevent Infection  Recent Flowsheet Documentation  Taken 11/3/2022 0400 by Melvin Suárez RN  Infection Prevention: environmental surveillance performed  Taken 11/3/2022 0200 by Melvin Suárez RN  Infection Prevention: environmental surveillance performed  Taken 11/3/2022 0000 by Melvin Suárez RN  Infection Prevention: environmental surveillance performed  Taken 11/2/2022 2200 by Melvin Suárez RN  Infection Prevention: environmental surveillance performed  Taken 11/2/2022 2000 by Melvin Suárez RN  Infection Prevention: environmental surveillance performed  Goal: Optimal Comfort and Wellbeing  Intervention: Monitor Pain and Promote Comfort  Recent Flowsheet Documentation  Taken 11/3/2022 0400 by Melvin Suárez RN  Pain Management Interventions: quiet environment facilitated  Taken 11/3/2022 0200 by Melvin Suárez RN  Pain Management Interventions: quiet environment facilitated  Taken 11/3/2022 0000 by Melvin Suárez RN  Pain Management Interventions: quiet environment facilitated  Taken 11/2/2022 2200 by Melvin Suárez RN  Pain Management Interventions: quiet environment facilitated  Taken 11/2/2022 2000 by Melvin Suárez RN  Pain Management Interventions: quiet environment facilitated  Intervention: Provide Person-Centered Care  Recent Flowsheet  Documentation  Taken 11/3/2022 0400 by Melvin Suárez RN  Trust Relationship/Rapport: care explained  Taken 11/3/2022 0200 by Melvin Suárez RN  Trust Relationship/Rapport: care explained  Taken 11/3/2022 0000 by Melvin Suárez RN  Trust Relationship/Rapport: care explained  Taken 11/2/2022 2200 by Melvin Suárez RN  Trust Relationship/Rapport: care explained  Taken 11/2/2022 2000 by Melvin Suárez RN  Trust Relationship/Rapport: care explained     Problem: Restraint, Nonbehavioral (Nonviolent)  Goal: Absence of Harm or Injury  Intervention: Protect Dignity, Rights, and Personal Wellbeing  Recent Flowsheet Documentation  Taken 11/3/2022 0400 by Melvin Suárez RN  Trust Relationship/Rapport: care explained  Taken 11/3/2022 0200 by Melvin Suárez RN  Trust Relationship/Rapport: care explained  Taken 11/3/2022 0000 by Melvin Suárez RN  Trust Relationship/Rapport: care explained  Taken 11/2/2022 2200 by Melvin Suárez RN  Trust Relationship/Rapport: care explained  Taken 11/2/2022 2000 by Melvin Suárez RN  Trust Relationship/Rapport: care explained  Intervention: Protect Skin and Joint Integrity  Recent Flowsheet Documentation  Taken 11/3/2022 0400 by Melvin Suárez RN  Body Position: supine  Taken 11/3/2022 0200 by Melvin Suárez RN  Body Position: supine  Taken 11/3/2022 0000 by Melvin Suárez RN  Body Position: supine  Taken 11/2/2022 2200 by Melvin Suárez RN  Body Position: supine  Taken 11/2/2022 2000 by Melvin Suárez RN  Body Position: supine     Problem: Pain Acute  Goal: Acceptable Pain Control and Functional Ability  Intervention: Prevent or Manage Pain  Recent Flowsheet Documentation  Taken 11/3/2022 0400 by Melvin Suárez RN  Medication Review/Management: medications reviewed  Taken 11/3/2022 0200 by Melvin Suárez RN  Medication Review/Management: medications reviewed  Taken 11/3/2022 0000 by Melvin Suárez, RN  Medication Review/Management: medications reviewed  Taken  11/2/2022 2200 by Melvin Suárez RN  Medication Review/Management: medications reviewed  Taken 11/2/2022 2000 by Melvin Suárez RN  Medication Review/Management: medications reviewed  Intervention: Develop Pain Management Plan  Recent Flowsheet Documentation  Taken 11/3/2022 0400 by Melvin Suárez RN  Pain Management Interventions: quiet environment facilitated  Taken 11/3/2022 0200 by Melvin Suárez RN  Pain Management Interventions: quiet environment facilitated  Taken 11/3/2022 0000 by Melvin Suárez RN  Pain Management Interventions: quiet environment facilitated  Taken 11/2/2022 2200 by Melvin Suárez RN  Pain Management Interventions: quiet environment facilitated  Taken 11/2/2022 2000 by Melvin Suárez RN  Pain Management Interventions: quiet environment facilitated  Intervention: Optimize Psychosocial Wellbeing  Recent Flowsheet Documentation  Taken 11/3/2022 0200 by Melvin Suárez RN  Supportive Measures: active listening utilized  Taken 11/3/2022 0000 by Melvin Suárez RN  Supportive Measures: active listening utilized  Taken 11/2/2022 2000 by Melvin Suárez RN  Supportive Measures: active listening utilized     Problem: Adjustment to Amputation (Lower Extremity Amputation)  Goal: Optimal Adjustment to Amputation  Intervention: Promote Patient and Family Adjustment to Amputation  Recent Flowsheet Documentation  Taken 11/3/2022 0200 by Melvin Suárez RN  Supportive Measures: active listening utilized  Taken 11/3/2022 0000 by Melvin Suárez RN  Supportive Measures: active listening utilized  Taken 11/2/2022 2000 by Melvin Suárez RN  Supportive Measures: active listening utilized     Problem: Adjustment to Surgery (Extremity Amputation)  Goal: Optimal Coping with Amputation  Intervention: Support Psychsocial Response  Recent Flowsheet Documentation  Taken 11/3/2022 0200 by Melvin Suárez RN  Supportive Measures: active listening utilized  Taken 11/3/2022 0000 by Melvin Suárez  RN  Supportive Measures: active listening utilized  Taken 11/2/2022 2000 by Melvin Suárez RN  Supportive Measures: active listening utilized  Goal: Optimal Coping with Amputation  Intervention: Support Psychsocial Response  Recent Flowsheet Documentation  Taken 11/3/2022 0200 by Melvin Suárez RN  Supportive Measures: active listening utilized  Taken 11/3/2022 0000 by Melvin Suárez RN  Supportive Measures: active listening utilized  Taken 11/2/2022 2000 by Melvin Suárez RN  Supportive Measures: active listening utilized     Problem: Infection (Extremity Amputation)  Goal: Absence of Infection Signs and Symptoms  Intervention: Prevent or Manage Infection  Recent Flowsheet Documentation  Taken 11/3/2022 0400 by Melvin Suárez RN  Infection Prevention: environmental surveillance performed  Taken 11/3/2022 0200 by Melvin Suárez RN  Infection Prevention: environmental surveillance performed  Taken 11/3/2022 0000 by Melvin Suárez RN  Infection Prevention: environmental surveillance performed  Taken 11/2/2022 2200 by Melvin Suárez RN  Infection Prevention: environmental surveillance performed  Taken 11/2/2022 2000 by Melvin Suárez RN  Infection Prevention: environmental surveillance performed  Goal: Absence of Infection Signs and Symptoms  Intervention: Prevent or Manage Infection  Recent Flowsheet Documentation  Taken 11/3/2022 0400 by Melvin Suárez RN  Infection Prevention: environmental surveillance performed  Taken 11/3/2022 0200 by Melvin Suárez RN  Infection Prevention: environmental surveillance performed  Taken 11/3/2022 0000 by Melvin Suárez RN  Infection Prevention: environmental surveillance performed  Taken 11/2/2022 2200 by Melvin Suárez RN  Infection Prevention: environmental surveillance performed  Taken 11/2/2022 2000 by Melvin Suárez RN  Infection Prevention: environmental surveillance performed     Problem: Ongoing Anesthesia Effects (Extremity Amputation)  Goal:  Anesthesia/Sedation Recovery  Intervention: Optimize Anesthesia Recovery  Recent Flowsheet Documentation  Taken 11/3/2022 0400 by Melvin Suárez RN  Safety Promotion/Fall Prevention:   activity supervised   safety round/check completed   toileting scheduled  Taken 11/3/2022 0200 by Melvin Suárez RN  Safety Promotion/Fall Prevention:   activity supervised   safety round/check completed   toileting scheduled  Taken 11/3/2022 0000 by Melvin Suárez RN  Safety Promotion/Fall Prevention:   activity supervised   safety round/check completed   toileting scheduled  Taken 11/2/2022 2200 by Melvin Suárez RN  Safety Promotion/Fall Prevention:   activity supervised   safety round/check completed   toileting scheduled  Taken 11/2/2022 2000 by Melvin Suárez RN  Safety Promotion/Fall Prevention:   activity supervised   safety round/check completed   toileting scheduled  Goal: Anesthesia/Sedation Recovery  Intervention: Optimize Anesthesia Recovery  Recent Flowsheet Documentation  Taken 11/3/2022 0400 by Melvin Suárez RN  Safety Promotion/Fall Prevention:   activity supervised   safety round/check completed   toileting scheduled  Taken 11/3/2022 0200 by Melvin Suárez RN  Safety Promotion/Fall Prevention:   activity supervised   safety round/check completed   toileting scheduled  Taken 11/3/2022 0000 by Melvin Suárez RN  Safety Promotion/Fall Prevention:   activity supervised   safety round/check completed   toileting scheduled  Taken 11/2/2022 2200 by Melvin Suárez RN  Safety Promotion/Fall Prevention:   activity supervised   safety round/check completed   toileting scheduled  Taken 11/2/2022 2000 by Melvin Suárez RN  Safety Promotion/Fall Prevention:   activity supervised   safety round/check completed   toileting scheduled     Problem: Pain (Extremity Amputation)  Goal: Acceptable Pain Control  Intervention: Prevent or Manage Pain  Recent Flowsheet Documentation  Taken 11/3/2022 0400 by Melvin Suárez  D, RN  Pain Management Interventions: quiet environment facilitated  Taken 11/3/2022 0200 by Melvin Suárez RN  Pain Management Interventions: quiet environment facilitated  Taken 11/3/2022 0000 by Melvin Suárez RN  Pain Management Interventions: quiet environment facilitated  Taken 11/2/2022 2200 by Melvin Suárez RN  Pain Management Interventions: quiet environment facilitated  Taken 11/2/2022 2000 by Melvin Suárez RN  Pain Management Interventions: quiet environment facilitated  Goal: Acceptable Pain Control  Intervention: Prevent or Manage Pain  Recent Flowsheet Documentation  Taken 11/3/2022 0400 by Melvin Suárez RN  Pain Management Interventions: quiet environment facilitated  Taken 11/3/2022 0200 by Melvin Suárez RN  Pain Management Interventions: quiet environment facilitated  Taken 11/3/2022 0000 by Melvin Suárez RN  Pain Management Interventions: quiet environment facilitated  Taken 11/2/2022 2200 by Melvin Suárez RN  Pain Management Interventions: quiet environment facilitated  Taken 11/2/2022 2000 by Melvin Suárez RN  Pain Management Interventions: quiet environment facilitated     Problem: Skin Injury Risk Increased  Goal: Skin Health and Integrity  Intervention: Optimize Skin Protection  Recent Flowsheet Documentation  Taken 11/3/2022 0400 by Melvin Suárez RN  Pressure Reduction Techniques: frequent weight shift encouraged  Head of Bed (HOB) Positioning: HOB at 30-45 degrees  Pressure Reduction Devices: positioning supports utilized  Skin Protection: adhesive use limited  Taken 11/3/2022 0200 by Melvin Suárez RN  Pressure Reduction Techniques: frequent weight shift encouraged  Head of Bed (HOB) Positioning: HOB at 30-45 degrees  Pressure Reduction Devices: positioning supports utilized  Skin Protection: adhesive use limited  Taken 11/3/2022 0000 by Melvin Suárez RN  Pressure Reduction Techniques: frequent weight shift encouraged  Head of Bed (HOB) Positioning: HOB at  30-45 degrees  Pressure Reduction Devices: positioning supports utilized  Skin Protection: adhesive use limited  Taken 11/2/2022 2200 by Melvin Suárez RN  Pressure Reduction Techniques: frequent weight shift encouraged  Head of Bed (HOB) Positioning: HOB at 30-45 degrees  Pressure Reduction Devices: positioning supports utilized  Skin Protection: adhesive use limited  Taken 11/2/2022 2000 by Melvin Suárez RN  Pressure Reduction Techniques: frequent weight shift encouraged  Head of Bed (HOB) Positioning: HOB at 30-45 degrees  Pressure Reduction Devices: positioning supports utilized  Skin Protection: adhesive use limited  Goal: Skin Health and Integrity  Intervention: Optimize Skin Protection  Recent Flowsheet Documentation  Taken 11/3/2022 0400 by Melvin Suárez RN  Pressure Reduction Techniques: frequent weight shift encouraged  Head of Bed (HOB) Positioning: HOB at 30-45 degrees  Pressure Reduction Devices: positioning supports utilized  Skin Protection: adhesive use limited  Taken 11/3/2022 0200 by Melvin Suárez RN  Pressure Reduction Techniques: frequent weight shift encouraged  Head of Bed (HOB) Positioning: HOB at 30-45 degrees  Pressure Reduction Devices: positioning supports utilized  Skin Protection: adhesive use limited  Taken 11/3/2022 0000 by Melvin Suárez RN  Pressure Reduction Techniques: frequent weight shift encouraged  Head of Bed (HOB) Positioning: HOB at 30-45 degrees  Pressure Reduction Devices: positioning supports utilized  Skin Protection: adhesive use limited  Taken 11/2/2022 2200 by Melvin Suárez RN  Pressure Reduction Techniques: frequent weight shift encouraged  Head of Bed (HOB) Positioning: HOB at 30-45 degrees  Pressure Reduction Devices: positioning supports utilized  Skin Protection: adhesive use limited  Taken 11/2/2022 2000 by Melvin Suárez RN  Pressure Reduction Techniques: frequent weight shift encouraged  Head of Bed (HOB) Positioning: HOB at 30-45  degrees  Pressure Reduction Devices: positioning supports utilized  Skin Protection: adhesive use limited     Problem: Fall Injury Risk  Goal: Absence of Fall and Fall-Related Injury  Intervention: Identify and Manage Contributors  Recent Flowsheet Documentation  Taken 11/3/2022 0400 by Melvin Suárez RN  Medication Review/Management: medications reviewed  Taken 11/3/2022 0200 by Melvin Suárez RN  Medication Review/Management: medications reviewed  Taken 11/3/2022 0000 by Melvin Suárez RN  Medication Review/Management: medications reviewed  Taken 11/2/2022 2200 by Melvin Suárez RN  Medication Review/Management: medications reviewed  Taken 11/2/2022 2000 by Melvin Suárez RN  Medication Review/Management: medications reviewed  Intervention: Promote Injury-Free Environment  Recent Flowsheet Documentation  Taken 11/3/2022 0400 by Melvin Suárez RN  Safety Promotion/Fall Prevention:   activity supervised   safety round/check completed   toileting scheduled  Taken 11/3/2022 0200 by Melvin Suárez RN  Safety Promotion/Fall Prevention:   activity supervised   safety round/check completed   toileting scheduled  Taken 11/3/2022 0000 by Melvin Suárez RN  Safety Promotion/Fall Prevention:   activity supervised   safety round/check completed   toileting scheduled  Taken 11/2/2022 2200 by Melvin Suárez RN  Safety Promotion/Fall Prevention:   activity supervised   safety round/check completed   toileting scheduled  Taken 11/2/2022 2000 by Melvin Suárez RN  Safety Promotion/Fall Prevention:   activity supervised   safety round/check completed   toileting scheduled  Goal: Absence of Fall and Fall-Related Injury  Intervention: Identify and Manage Contributors  Recent Flowsheet Documentation  Taken 11/3/2022 0400 by Melvin Suárez RN  Medication Review/Management: medications reviewed  Taken 11/3/2022 0200 by Melvin Suárez RN  Medication Review/Management: medications reviewed  Taken 11/3/2022 0000 by  Melvin Suárez RN  Medication Review/Management: medications reviewed  Taken 11/2/2022 2200 by Melvin Suárez RN  Medication Review/Management: medications reviewed  Taken 11/2/2022 2000 by Melvin Suárez RN  Medication Review/Management: medications reviewed  Intervention: Promote Injury-Free Environment  Recent Flowsheet Documentation  Taken 11/3/2022 0400 by Melvin Suárez RN  Safety Promotion/Fall Prevention:   activity supervised   safety round/check completed   toileting scheduled  Taken 11/3/2022 0200 by Melvin Suárez RN  Safety Promotion/Fall Prevention:   activity supervised   safety round/check completed   toileting scheduled  Taken 11/3/2022 0000 by Melvin Suárez RN  Safety Promotion/Fall Prevention:   activity supervised   safety round/check completed   toileting scheduled  Taken 11/2/2022 2200 by Melvin Suárez RN  Safety Promotion/Fall Prevention:   activity supervised   safety round/check completed   toileting scheduled  Taken 11/2/2022 2000 by Melvin Suárez RN  Safety Promotion/Fall Prevention:   activity supervised   safety round/check completed   toileting scheduled     Problem: Pain Acute  Goal: Acceptable Pain Control and Functional Ability  Intervention: Prevent or Manage Pain  Recent Flowsheet Documentation  Taken 11/3/2022 0400 by Melvin Suárez RN  Medication Review/Management: medications reviewed  Taken 11/3/2022 0200 by Melvin Suárez RN  Medication Review/Management: medications reviewed  Taken 11/3/2022 0000 by Melvin Suárez RN  Medication Review/Management: medications reviewed  Taken 11/2/2022 2200 by Melvin Suárez RN  Medication Review/Management: medications reviewed  Taken 11/2/2022 2000 by Melvin Suárez RN  Medication Review/Management: medications reviewed  Intervention: Develop Pain Management Plan  Recent Flowsheet Documentation  Taken 11/3/2022 0400 by Melvin Suárez RN  Pain Management Interventions: quiet environment facilitated  Taken  11/3/2022 0200 by Melvin Suárez RN  Pain Management Interventions: quiet environment facilitated  Taken 11/3/2022 0000 by Melvin Suárez RN  Pain Management Interventions: quiet environment facilitated  Taken 11/2/2022 2200 by Melvin Suárez RN  Pain Management Interventions: quiet environment facilitated  Taken 11/2/2022 2000 by Melvin Suárez RN  Pain Management Interventions: quiet environment facilitated  Intervention: Optimize Psychosocial Wellbeing  Recent Flowsheet Documentation  Taken 11/3/2022 0200 by Melvin Suárez RN  Supportive Measures: active listening utilized  Taken 11/3/2022 0000 by Melvin Suáerz RN  Supportive Measures: active listening utilized  Taken 11/2/2022 2000 by Melvin Suárez RN  Supportive Measures: active listening utilized     Problem: Adjustment to Amputation (Lower Extremity Amputation)  Goal: Optimal Adjustment to Amputation  Intervention: Promote Patient and Family Adjustment to Amputation  Recent Flowsheet Documentation  Taken 11/3/2022 0200 by Melvin Suárez RN  Supportive Measures: active listening utilized  Taken 11/3/2022 0000 by Melvin Suárez RN  Supportive Measures: active listening utilized  Taken 11/2/2022 2000 by Melvin Suárez RN  Supportive Measures: active listening utilized     VSS, voids well, rested throughout the night, will continue to monitor for changes.

## 2022-11-03 NOTE — PROGRESS NOTES
[facility] Medicine Services  PROGRESS NOTE    Patient Name: Omkar Nava  : 1955  MRN: [id number]    Date of Admission: 10/14/2022  Primary Care Physician: Provider, No Known    Subjective   Subjective     CC:  F/U left foot osteomyelitis    HPI:  Patient seen this morning, family member at bedside. He has no current complaints. No acute issues overnight.     ROS:  Gen-no fevers, no chills  CV-no chest pain, no palpitations  Resp-no cough, no dyspnea  GI-no N/V/D, no abd pain    All other systems reviewed and negative except any additional pertinent positives and negatives as discussed in HPI.      Objective   Objective     Vital Signs:   Temp:  [97.9 °F (36.6 °C)-98.6 °F (37 °C)] 98.6 °F (37 °C)  Heart Rate:  [71-78] 72  Resp:  [16-20] 16  BP: (154-164)/(63-80) 158/80  Flow (L/min):  [2] 2     Physical Exam:  Gen-no acute distress  HENT-NCAT, mucous membranes moist  CV-RRR, S1 S2 normal, no m/r/g  Resp-CTAB, no wheezes or rales  Abd-soft, NT, ND, +BS  Ext-no edema, left leg in splint, can wiggle toes  Neuro-A&Ox3, forgetful, no focal deficits  Skin-no rashes  Psych-appropriate mood      Results Reviewed:  LAB RESULTS:      Lab 22  0844 10/31/22  0535   WBC 6.07 9.55   HEMOGLOBIN 7.7* 7.6*   HEMATOCRIT 25.4* 24.5*   PLATELETS 296 259   NEUTROS ABS  --  7.16*   IMMATURE GRANS (ABS)  --  0.04   LYMPHS ABS  --  1.45   MONOS ABS  --  0.72   EOS ABS  --  0.15   MCV 91.7 90.4         Lab 22  0844 10/31/22  0535   SODIUM 135* 132*   POTASSIUM 4.1 3.7   CHLORIDE 101 98   CO2 24.0 24.0   ANION GAP 10.0 10.0   BUN 22 19   CREATININE 1.00 1.07   EGFR 83.5 77.0   GLUCOSE 174* 159*   CALCIUM 9.0 8.6         Lab 10/31/22  0535   TOTAL PROTEIN 6.8   ALBUMIN 2.90*   GLOBULIN 3.9   ALT (SGPT) 16   AST (SGOT) 11   BILIRUBIN <0.2   ALK PHOS 106                     Brief Urine Lab Results  (Last result in the past 365 days)      Color   Clarity   Blood   Leuk Est   Nitrite   Protein    CREAT   Urine HCG        06/12/22 2056 Yellow   Clear     Negative                     Microbiology Results Abnormal     Procedure Component Value - Date/Time    Fungus Culture - Tissue, Toe, Left [[id number]] Collected: 10/18/22 1608    Lab Status: Preliminary result Specimen: Tissue from Toe, Left Updated: 11/01/22 1815     Fungus Culture No fungus isolated at 2 weeks    AFB Culture - Tissue, Toe, Left [[id number]] Collected: 10/18/22 1608    Lab Status: Preliminary result Specimen: Tissue from Toe, Left Updated: 11/01/22 1815     AFB Culture No AFB isolated at 2 weeks     AFB Stain No acid fast bacilli seen on concentrated smear    Fungus Culture - Tissue, Toe, Left [[id number]] Collected: 10/18/22 1552    Lab Status: Preliminary result Specimen: Tissue from Toe, Left Updated: 11/01/22 1801     Fungus Culture No fungus isolated at 2 weeks    AFB Culture - Tissue, Toe, Left [[id number]] Collected: 10/18/22 1552    Lab Status: Preliminary result Specimen: Tissue from Toe, Left Updated: 11/01/22 1801     AFB Culture No AFB isolated at 2 weeks     AFB Stain No acid fast bacilli seen on concentrated smear    COVID PRE-OP / PRE-PROCEDURE SCREENING ORDER (NO ISOLATION) - Swab, Nasopharynx [[id number]]  (Normal) Collected: 10/27/22 0510    Lab Status: Final result Specimen: Swab from Nasopharynx Updated: 10/27/22 0530    Narrative:      The following orders were created for panel order COVID PRE-OP / PRE-PROCEDURE SCREENING ORDER (NO ISOLATION) - Swab, Nasopharynx.  Procedure                               Abnormality         Status                     ---------                               -----------         ------                     COVID-19, ABBOTT IN-HOUS...[[id number]]  Normal              Final result                 Please view results for these tests on the individual orders.    COVID-19, ABBOTT IN-HOUSE,NASAL Swab (NO TRANSPORT MEDIA) 2 HR TAT - Swab, Nasopharynx [[id number]]  (Normal) Collected: 10/27/22 0510    Lab  Status: Final result Specimen: Swab from Nasopharynx Updated: 10/27/22 0530     COVID19 Presumptive Negative    Narrative:      Fact sheet for providers: https://www.fda.gov/media/249705/download     Fact sheet for patients: https://www.fda.gov/media/283014/download    Test performed by PCR.  If inconsistent with clinical signs and symptoms patient should be tested with different authorized molecular test.    Anaerobic Culture - Tissue, Toe, Left [[id number]]  (Normal) Collected: 10/18/22 1608    Lab Status: Final result Specimen: Tissue from Toe, Left Updated: 10/23/22 0543     Anaerobic Culture No anaerobes isolated at 5 days    Anaerobic Culture - Tissue, Toe, Left [[id number]]  (Normal) Collected: 10/18/22 1552    Lab Status: Final result Specimen: Tissue from Toe, Left Updated: 10/23/22 0543     Anaerobic Culture No anaerobes isolated at 5 days    Tissue / Bone Culture - Tissue, Toe, Left [[id number]] Collected: 10/18/22 1552    Lab Status: Final result Specimen: Tissue from Toe, Left Updated: 10/21/22 0840     Tissue Culture No growth at 3 days     Gram Stain Moderate (3+) WBCs seen      No organisms seen    Blood Culture - Blood, Hand, Left [[id number]]  (Normal) Collected: 10/14/22 2115    Lab Status: Final result Specimen: Blood from Hand, Left Updated: 10/19/22 2215     Blood Culture No growth at 5 days    Blood Culture - Blood, Arm, Left [[id number]]  (Normal) Collected: 10/14/22 2115    Lab Status: Final result Specimen: Blood from Arm, Left Updated: 10/19/22 2215     Blood Culture No growth at 5 days    MRSA Screen, PCR (Inpatient) - Swab, Nares [[id number]]  (Normal) Collected: 10/14/22 2345    Lab Status: Final result Specimen: Swab from Nares Updated: 10/15/22 0741     MRSA PCR Negative    Narrative:      The negative predictive value of this diagnostic test is high and should only be used to consider de-escalating anti-MRSA therapy. A positive result may indicate colonization with MRSA and must be  "correlated clinically.  MRSA Negative          No radiology results from the last 24 hrs        I have reviewed the medications:  Scheduled Meds:amLODIPine, 10 mg, Oral, Q24H  cefTRIAXone, 2 g, Intravenous, Q24H  ferrous sulfate, 325 mg, Oral, Daily With Breakfast  heparin (porcine), 5,000 Units, Subcutaneous, Q8H  insulin detemir, 10 Units, Subcutaneous, Nightly  insulin lispro, 0-7 Units, Subcutaneous, TID AC  Insulin Lispro, 8 Units, Subcutaneous, TID With Meals  lisinopril, 40 mg, Oral, Daily  OLANZapine, 5 mg, Oral, Daily   And  OLANZapine, 10 mg, Oral, Nightly  senna-docusate sodium, 2 tablet, Oral, Nightly  sodium chloride, 10 mL, Intravenous, Q12H  vancomycin, 750 mg, Intravenous, Q12H      Continuous Infusions:   PRN Meds:.•  acetaminophen **OR** acetaminophen  •  bisacodyl  •  dextrose  •  dextrose  •  glucagon (human recombinant)  •  magnesium sulfate **OR** magnesium sulfate in D5W 1g/100mL (PREMIX) **OR** magnesium sulfate  •  melatonin  •  [] HYDROmorphone **AND** naloxone  •  polyethylene glycol  •  Insert peripheral IV **AND** sodium chloride    Assessment & Plan   Assessment & Plan     Active Hospital Problems    Diagnosis  POA   • **Acute osteomyelitis of left foot (HCC) [M86.172]  Yes   • Neurocognitive disorder [R41.9]  Unknown   • Type 2 diabetes mellitus (HCC) [E11.9]  Yes   • Essential hypertension [I10]  Yes   • Psychotic disorder (HCC) [F29]  Yes      Resolved Hospital Problems   No resolved problems to display.        Brief Hospital Course to date:  Omkar Nava is a 67 y.o. male with past medical history significant for DM2, HTN, and unspecified psychotic disorder presents to the ED due to worsening left foot wound. Per my partner's note, \"patient presented to [facility] on 2022 due to mechanical fall causing open articular fracture on the left foot. He was treated at that time with bedside flushing and repair by Podiatry. He was given Ancef and " "sent back to nursing facility on Keflex. Wound cultures at that time grew MRSA; however, blood culture showed no growth. On 9/27/2022 patient presented back to hospital in [place] due to worsening foot wound and noted drainage. He underwent left foot debridement and I&D on 9/27/2022. He again underwent left foot debridement and I&D but required no amputation on 9/30/2022.  PICC line was placed on 10/3/2022. Patient was to receive Daptomycin; however due to expense this was changed to Rifampin and Linezolid. PICC line was removed. At some point, patient had transferred from nursing facility in [place] to [place] to be closer to family.\"    This patient's problems and plans were partially entered by my partner and updated as appropriate by me 11/03/22.     Osteomyelitis of left foot/1st great toe (s/p amputation)  Raoultella Ornithinolytica growing from wound  - MRI shows wound around the great toe with geographic marrow signal alteration within the distal aspect of the first metatarsal as well as both phalanges of the great toe indicative of osteomyelitis  -s/p left great toe amputation on 10/18/22 by Dr. Petty  - NWB LLE. Elevate LLE. Keep splint clean and dry. SQ heparin for DVT prophylaxis while inpatient. Discharge on  mg daily x 30 days.  - Dressing changed 11/1/22; Has follow up with Dr. Petty  scheduled 11/8/22. Sutures remain in place. Appointment may need rescheduled if he is not discharged by then.  - PICC placed 10/27/22  - ID following (Dr. Gordon): plan for IV Vanc and Rocephin through 11/25/22.  Follow up with Dr. Gordon in 2 weeks.      Anemia  Iron deficiency  - Completed 3 days IV iron on 10/20/22  - Overall stable; occult negative      DM II (HbA1c 7.0)  - Continue Levemir 10 units nightly  - Continue Humalog 8 units TID with meals  - Continue SSI   - Adjust PRN      HTN  - Increased to Amlodipine 10 mg daily and Lisinopril 40 mg daily with improvement   - BP controlled "      Neurocognitive disorder   Dementia   - Re-started olanzapine 10/15/22, was recently hospitalized at [facility] for psychiatric issues; apparently PRN Haldol was not much help, have discontinued  - Continue olanzapine 5 mg qAM and 10 mg qHS   - Overall stable    Expected Discharge Location and Transportation: rehab  Expected Discharge Date: 11/04/22 or anytime placement is found, patient medically ready    DVT prophylaxis:  Medical and mechanical DVT prophylaxis orders are present.     AM-PAC 6 Clicks Score (PT): 10 (11/03/22 0800)    CODE STATUS:   Code Status and Medical Interventions:   Ordered at: 10/14/22 2230     Level Of Support Discussed With:    Patient     Code Status (Patient has no pulse and is not breathing):    CPR (Attempt to Resuscitate)     Medical Interventions (Patient has pulse or is breathing):    Full Support       Ludmila Parish MD  11/03/22

## 2022-11-04 LAB
ANION GAP SERPL CALCULATED.3IONS-SCNC: 8 MMOL/L (ref 5–15)
BUN SERPL-MCNC: 19 MG/DL (ref 8–23)
BUN/CREAT SERPL: 20.7 (ref 7–25)
CALCIUM SPEC-SCNC: 9.2 MG/DL (ref 8.6–10.5)
CHLORIDE SERPL-SCNC: 105 MMOL/L (ref 98–107)
CO2 SERPL-SCNC: 28 MMOL/L (ref 22–29)
CREAT SERPL-MCNC: 0.92 MG/DL (ref 0.76–1.27)
EGFRCR SERPLBLD CKD-EPI 2021: 92.3 ML/MIN/1.73
GLUCOSE BLDC GLUCOMTR-MCNC: 114 MG/DL (ref 70–130)
GLUCOSE BLDC GLUCOMTR-MCNC: 126 MG/DL (ref 70–130)
GLUCOSE BLDC GLUCOMTR-MCNC: 173 MG/DL (ref 70–130)
GLUCOSE SERPL-MCNC: 105 MG/DL (ref 65–99)
POTASSIUM SERPL-SCNC: 4.4 MMOL/L (ref 3.5–5.2)
SODIUM SERPL-SCNC: 141 MMOL/L (ref 136–145)

## 2022-11-04 PROCEDURE — 99232 SBSQ HOSP IP/OBS MODERATE 35: CPT | Performed by: HOSPITALIST

## 2022-11-04 PROCEDURE — 80048 BASIC METABOLIC PNL TOTAL CA: CPT

## 2022-11-04 PROCEDURE — 63710000001 INSULIN LISPRO (HUMAN) PER 5 UNITS: Performed by: ORTHOPAEDIC SURGERY

## 2022-11-04 PROCEDURE — 25010000002 VANCOMYCIN PER 500 MG

## 2022-11-04 PROCEDURE — 63710000001 INSULIN LISPRO (HUMAN) PER 5 UNITS: Performed by: INTERNAL MEDICINE

## 2022-11-04 PROCEDURE — 82962 GLUCOSE BLOOD TEST: CPT

## 2022-11-04 PROCEDURE — 25010000002 CEFTRIAXONE PER 250 MG: Performed by: INTERNAL MEDICINE

## 2022-11-04 PROCEDURE — 63710000001 INSULIN DETEMIR PER 5 UNITS: Performed by: INTERNAL MEDICINE

## 2022-11-04 PROCEDURE — 25010000002 HEPARIN (PORCINE) PER 1000 UNITS: Performed by: ORTHOPAEDIC SURGERY

## 2022-11-04 RX ADMIN — INSULIN LISPRO 8 UNITS: 100 INJECTION, SOLUTION INTRAVENOUS; SUBCUTANEOUS at 12:19

## 2022-11-04 RX ADMIN — AMLODIPINE BESYLATE 10 MG: 10 TABLET ORAL at 09:16

## 2022-11-04 RX ADMIN — LISINOPRIL 40 MG: 40 TABLET ORAL at 09:16

## 2022-11-04 RX ADMIN — SODIUM CHLORIDE 2 G: 900 INJECTION INTRAVENOUS at 10:52

## 2022-11-04 RX ADMIN — SENNOSIDES AND DOCUSATE SODIUM 2 TABLET: 50; 8.6 TABLET ORAL at 21:10

## 2022-11-04 RX ADMIN — INSULIN LISPRO 8 UNITS: 100 INJECTION, SOLUTION INTRAVENOUS; SUBCUTANEOUS at 09:16

## 2022-11-04 RX ADMIN — Medication 10 ML: at 09:17

## 2022-11-04 RX ADMIN — OLANZAPINE 5 MG: 5 TABLET, FILM COATED ORAL at 10:52

## 2022-11-04 RX ADMIN — OLANZAPINE 10 MG: 5 TABLET, FILM COATED ORAL at 21:10

## 2022-11-04 RX ADMIN — INSULIN DETEMIR 10 UNITS: 100 INJECTION, SOLUTION SUBCUTANEOUS at 21:11

## 2022-11-04 RX ADMIN — HEPARIN SODIUM 5000 UNITS: 5000 INJECTION INTRAVENOUS; SUBCUTANEOUS at 14:37

## 2022-11-04 RX ADMIN — INSULIN LISPRO 2 UNITS: 100 INJECTION, SOLUTION INTRAVENOUS; SUBCUTANEOUS at 12:18

## 2022-11-04 RX ADMIN — INSULIN LISPRO 8 UNITS: 100 INJECTION, SOLUTION INTRAVENOUS; SUBCUTANEOUS at 17:16

## 2022-11-04 RX ADMIN — FERROUS SULFATE TAB 325 MG (65 MG ELEMENTAL FE) 325 MG: 325 (65 FE) TAB at 09:16

## 2022-11-04 RX ADMIN — VANCOMYCIN HYDROCHLORIDE 750 MG: 750 INJECTION, SOLUTION INTRAVENOUS at 02:20

## 2022-11-04 RX ADMIN — Medication 10 ML: at 21:13

## 2022-11-04 RX ADMIN — VANCOMYCIN HYDROCHLORIDE 750 MG: 750 INJECTION, SOLUTION INTRAVENOUS at 14:37

## 2022-11-04 RX ADMIN — HEPARIN SODIUM 5000 UNITS: 5000 INJECTION INTRAVENOUS; SUBCUTANEOUS at 21:10

## 2022-11-04 NOTE — PROGRESS NOTES
[facility] Medicine Services  PROGRESS NOTE    Patient Name: Omkar Nava  : 1955  MRN: [id number]    Date of Admission: 10/14/2022  Primary Care Physician: Provider, No Known    Subjective   Subjective     CC:  F/U left foot osteomyelitis    HPI:  Patient seen this morning, confused about where he is. Easily reoriented however. No complaints.    ROS:  Gen-no fevers, no chills  CV-no chest pain, no palpitations  Resp-no cough, no dyspnea  GI-no N/V/D, no abd pain    All other systems reviewed and negative except any additional pertinent positives and negatives as discussed in HPI.      Objective   Objective     Vital Signs:   Temp:  [97.8 °F (36.6 °C)-98.5 °F (36.9 °C)] 97.8 °F (36.6 °C)  Heart Rate:  [71-82] 82  Resp:  [16-20] 20  BP: (154-183)/(72-88) 155/79     Physical Exam:  Gen-no acute distress  HENT-NCAT, mucous membranes moist  CV-RRR, S1 S2 normal, no m/r/g  Resp-CTAB, no wheezes or rales  Abd-soft, NT, ND, +BS  Ext-no edema, left leg in splint, can wiggle toes  Neuro-A&Ox2, forgetful, easily reoriented for the most part, no focal deficits  Skin-no rashes  Psych-appropriate mood      Results Reviewed:  LAB RESULTS:      Lab 22  0844 10/31/22  0535   WBC 6.07 9.55   HEMOGLOBIN 7.7* 7.6*   HEMATOCRIT 25.4* 24.5*   PLATELETS 296 259   NEUTROS ABS  --  7.16*   IMMATURE GRANS (ABS)  --  0.04   LYMPHS ABS  --  1.45   MONOS ABS  --  0.72   EOS ABS  --  0.15   MCV 91.7 90.4         Lab 22  0417 22  0844 10/31/22  0535   SODIUM 141 135* 132*   POTASSIUM 4.4 4.1 3.7   CHLORIDE 105 101 98   CO2 28.0 24.0 24.0   ANION GAP 8.0 10.0 10.0   BUN 19 22 19   CREATININE 0.92 1.00 1.07   EGFR 92.3 83.5 77.0   GLUCOSE 105* 174* 159*   CALCIUM 9.2 9.0 8.6         Lab 10/31/22  0535   TOTAL PROTEIN 6.8   ALBUMIN 2.90*   GLOBULIN 3.9   ALT (SGPT) 16   AST (SGOT) 11   BILIRUBIN <0.2   ALK PHOS 106                     Brief Urine Lab Results  (Last result in the past 365 days)       Color   Clarity   Blood   Leuk Est   Nitrite   Protein   CREAT   Urine HCG        06/12/22 2056 Yellow   Clear     Negative                     Microbiology Results Abnormal     Procedure Component Value - Date/Time    Fungus Culture - Tissue, Toe, Left [[id number]] Collected: 10/18/22 1608    Lab Status: Preliminary result Specimen: Tissue from Toe, Left Updated: 11/01/22 1815     Fungus Culture No fungus isolated at 2 weeks    AFB Culture - Tissue, Toe, Left [[id number]] Collected: 10/18/22 1608    Lab Status: Preliminary result Specimen: Tissue from Toe, Left Updated: 11/01/22 1815     AFB Culture No AFB isolated at 2 weeks     AFB Stain No acid fast bacilli seen on concentrated smear    Fungus Culture - Tissue, Toe, Left [[id number]] Collected: 10/18/22 1552    Lab Status: Preliminary result Specimen: Tissue from Toe, Left Updated: 11/01/22 1801     Fungus Culture No fungus isolated at 2 weeks    AFB Culture - Tissue, Toe, Left [[id number]] Collected: 10/18/22 1552    Lab Status: Preliminary result Specimen: Tissue from Toe, Left Updated: 11/01/22 1801     AFB Culture No AFB isolated at 2 weeks     AFB Stain No acid fast bacilli seen on concentrated smear    COVID PRE-OP / PRE-PROCEDURE SCREENING ORDER (NO ISOLATION) - Swab, Nasopharynx [[id number]]  (Normal) Collected: 10/27/22 0510    Lab Status: Final result Specimen: Swab from Nasopharynx Updated: 10/27/22 0530    Narrative:      The following orders were created for panel order COVID PRE-OP / PRE-PROCEDURE SCREENING ORDER (NO ISOLATION) - Swab, Nasopharynx.  Procedure                               Abnormality         Status                     ---------                               -----------         ------                     COVID-19, ABBOTT IN-HOUS...[[id number]]  Normal              Final result                 Please view results for these tests on the individual orders.    COVID-19, ABBOTT IN-HOUSE,NASAL Swab (NO TRANSPORT MEDIA) 2 HR TAT - Swab,  Nasopharynx [[id number]]  (Normal) Collected: 10/27/22 0510    Lab Status: Final result Specimen: Swab from Nasopharynx Updated: 10/27/22 0530     COVID19 Presumptive Negative    Narrative:      Fact sheet for providers: https://www.fda.gov/media/772089/download     Fact sheet for patients: https://www.fda.gov/media/797825/download    Test performed by PCR.  If inconsistent with clinical signs and symptoms patient should be tested with different authorized molecular test.    Anaerobic Culture - Tissue, Toe, Left [[id number]]  (Normal) Collected: 10/18/22 1608    Lab Status: Final result Specimen: Tissue from Toe, Left Updated: 10/23/22 0543     Anaerobic Culture No anaerobes isolated at 5 days    Anaerobic Culture - Tissue, Toe, Left [[id number]]  (Normal) Collected: 10/18/22 1552    Lab Status: Final result Specimen: Tissue from Toe, Left Updated: 10/23/22 0543     Anaerobic Culture No anaerobes isolated at 5 days    Tissue / Bone Culture - Tissue, Toe, Left [[id number]] Collected: 10/18/22 1552    Lab Status: Final result Specimen: Tissue from Toe, Left Updated: 10/21/22 0840     Tissue Culture No growth at 3 days     Gram Stain Moderate (3+) WBCs seen      No organisms seen    Blood Culture - Blood, Hand, Left [[id number]]  (Normal) Collected: 10/14/22 2115    Lab Status: Final result Specimen: Blood from Hand, Left Updated: 10/19/22 2215     Blood Culture No growth at 5 days    Blood Culture - Blood, Arm, Left [[id number]]  (Normal) Collected: 10/14/22 2115    Lab Status: Final result Specimen: Blood from Arm, Left Updated: 10/19/22 2215     Blood Culture No growth at 5 days    MRSA Screen, PCR (Inpatient) - Swab, Nares [[id number]]  (Normal) Collected: 10/14/22 2345    Lab Status: Final result Specimen: Swab from Nares Updated: 10/15/22 0741     MRSA PCR Negative    Narrative:      The negative predictive value of this diagnostic test is high and should only be used to consider de-escalating anti-MRSA therapy. A  "positive result may indicate colonization with MRSA and must be correlated clinically.  MRSA Negative          No radiology results from the last 24 hrs        I have reviewed the medications:  Scheduled Meds:amLODIPine, 10 mg, Oral, Q24H  cefTRIAXone, 2 g, Intravenous, Q24H  ferrous sulfate, 325 mg, Oral, Daily With Breakfast  heparin (porcine), 5,000 Units, Subcutaneous, Q8H  insulin detemir, 10 Units, Subcutaneous, Nightly  insulin lispro, 0-7 Units, Subcutaneous, TID AC  Insulin Lispro, 8 Units, Subcutaneous, TID With Meals  lisinopril, 40 mg, Oral, Daily  OLANZapine, 5 mg, Oral, Daily   And  OLANZapine, 10 mg, Oral, Nightly  senna-docusate sodium, 2 tablet, Oral, Nightly  sodium chloride, 10 mL, Intravenous, Q12H  vancomycin, 750 mg, Intravenous, Q12H      Continuous Infusions:   PRN Meds:.•  acetaminophen **OR** acetaminophen  •  bisacodyl  •  dextrose  •  dextrose  •  glucagon (human recombinant)  •  magnesium sulfate **OR** magnesium sulfate in D5W 1g/100mL (PREMIX) **OR** magnesium sulfate  •  melatonin  •  [] HYDROmorphone **AND** naloxone  •  polyethylene glycol  •  Insert peripheral IV **AND** sodium chloride    Assessment & Plan   Assessment & Plan     Active Hospital Problems    Diagnosis  POA   • **Acute osteomyelitis of left foot (HCC) [M86.172]  Yes   • Neurocognitive disorder [R41.9]  Unknown   • Type 2 diabetes mellitus (HCC) [E11.9]  Yes   • Essential hypertension [I10]  Yes   • Psychotic disorder (HCC) [F29]  Yes      Resolved Hospital Problems   No resolved problems to display.        Brief Hospital Course to date:  Omkar Nava is a 67 y.o. male with past medical history significant for DM2, HTN, and unspecified psychotic disorder presents to the ED due to worsening left foot wound. Per my partner's note, \"patient presented to [facility] on 2022 due to mechanical fall causing open articular fracture on the left foot. He was treated at that time with bedside " "flushing and repair by Podiatry. He was given Ancef and sent back to nursing facility on Keflex. Wound cultures at that time grew MRSA; however, blood culture showed no growth. On 9/27/2022 patient presented back to hospital in [place] due to worsening foot wound and noted drainage. He underwent left foot debridement and I&D on 9/27/2022. He again underwent left foot debridement and I&D but required no amputation on 9/30/2022.  PICC line was placed on 10/3/2022. Patient was to receive Daptomycin; however due to expense this was changed to Rifampin and Linezolid. PICC line was removed. At some point, patient had transferred from nursing facility in [place] to [place] to be closer to family.\"    This patient's problems and plans were partially entered by my partner and updated as appropriate by me 11/04/22.     Osteomyelitis of left foot/1st great toe (s/p amputation)  Raoultella Ornithinolytica growing from wound  - MRI shows wound around the great toe with geographic marrow signal alteration within the distal aspect of the first metatarsal as well as both phalanges of the great toe indicative of osteomyelitis  -s/p left great toe amputation on 10/18/22 by Dr. Petty  - NWB LLE. Elevate LLE. Keep splint clean and dry. SQ heparin for DVT prophylaxis while inpatient. Discharge on  mg daily x 30 days.  - Dressing changed 11/1/22; has follow up with Dr. Petty  scheduled 11/8/22 for wound check/suture removal, if patient still hospitalized will need to notify Dr. Petty to see if he will remove sutures here  - PICC placed 10/27/22  - ID following (Dr. Gordon): plan for IV Vanc and Rocephin through 11/25/22.  Follow up with Dr. Gordon in 2 weeks.      Anemia  Iron deficiency  - Completed 3 days IV iron on 10/20/22  - Overall stable; occult negative   - Check CBC in AM     DM II (HbA1c 7.0)  - Continue Levemir 10 units nightly  - Continue Humalog 8 units TID with meals  - Continue SSI   - Adjust " PRN      HTN  - Increased to Amlodipine 10 mg daily and Lisinopril 40 mg daily with improvement   - BP controlled      Neurocognitive disorder   Dementia   - Re-started olanzapine 10/15/22, was recently hospitalized at [place] for psychiatric issues; apparently PRN Haldol was not much help, have discontinued  - Continue olanzapine 5 mg qAM and 10 mg qHS   - Overall stable    Expected Discharge Location and Transportation: rehab  Expected Discharge Date: 11/05/22 or anytime placement is found, patient medically ready    DVT prophylaxis:  Medical and mechanical DVT prophylaxis orders are present.     AM-PAC 6 Clicks Score (PT): 19 (11/04/22 0800)    CODE STATUS:   Code Status and Medical Interventions:   Ordered at: 10/14/22 2230     Level Of Support Discussed With:    Patient     Code Status (Patient has no pulse and is not breathing):    CPR (Attempt to Resuscitate)     Medical Interventions (Patient has pulse or is breathing):    Full Support       Ludmila Parish MD  11/04/22

## 2022-11-04 NOTE — CASE MANAGEMENT/SOCIAL WORK
Continued Stay Note  [facility]     Patient Name: Omkar Nava  MRN: [id number]  Today's Date: 11/4/2022    Admit Date: 10/14/2022    Plan: Ongoing   Discharge Plan     Row Name 11/04/22 1415       Plan    Plan Ongoing    Patient/Family in Agreement with Plan yes    Plan Comments Facilities continue to look at Mr. Nava. [facility] in [place] is reviewing Mr. Nava for a bed offer as well. Will continue to await a bed offer. Case management will continue to follow.    Final Discharge Disposition Code 30 - still a patient    Row Name 11/04/22 1225       Plan    Plan --    Patient/Family in Agreement with Plan --    Plan Comments --               Discharge Codes    No documentation.               Expected Discharge Date and Time     Expected Discharge Date Expected Discharge Time    Nov 11, 2022             Josep Sweeney RN

## 2022-11-04 NOTE — PLAN OF CARE
Goal Outcome Evaluation:   Patient has been resting comfortably with no acute signs of distress. VSS. AO x1. No complaints of pain overnight. Patient refusing some points of care but amenable for most of the night. Dressing C/D/I. Will continue to monitor as patient progresses towards discharge.

## 2022-11-04 NOTE — PROGRESS NOTES
INFECTIOUS DISEASE Progress Note    Omkar Nava  1955  [id number]    Consult: 10/15/22  Admit date: 10/14/2022    Requesting Provider: Omkar Seth MD  Evaluating physician:  Rayray Gordon MD  Reason for Consultation: left foot osteomyelitis, first toe, distal phalanx  Chief Complaint: left foot pain      Subjective   History of present illness:  Patient is a  67 y.o. male with h/o T2DM, PN, HTN, normocytic anemia, and GERD who presented to [facility] ED on 10/14 for worsening left foot pain.  The patient is a poor historian and most of the information was taken from the chart.  He tripped at his nursing facility, [facility], while running the street in flip-flops and sustained a laceration of his first webspace.  He was taken to [facility] ED on 9/17/22 and found to have a small intra-articular vertical fracture through the base the the great toe proximal phalanx. Podiatry, Dr. Ruiz, saw patient and the wound with irrigated with suture placement.  The area was dressed and he was placed in a surgical shoe.  He was given Cefazolin x 1 dose and discharged back to his facility on Keflex for 7 days.  On 9/26/22, the nursing staff at increased pain, swelling, and redness around the foot and sent him to [facility] ED on that day.  He was found to have a displaced fracture of proximal phalanx of left hallux along with abscess and gas in tissues.  He was admitted to the hospital and underwent Left foot debridement and I and D on 9/27/22 by Dr. Ruiz with culture positive for MRSA (Resistant to Cefazolin, clindamycin, erm, oxacillin, tetracycline, Bactrim and sensitive to Vancomycin, Daptomycin-KB 1, and Linezolid KB 2), Corynebacterium striatum, and Enterococcus faecalis (sens to Vanc and ampicillin).  He underwent a second debridement and I and D on 9/30.  He was given Cefazolin in ED and then changed to Daptomycin.  A PICC line was placed on 10/3 for Daptomycin infusions,  but the antibiotic was too expensive for the SNF, so he was changed to oral Zyvox and Rifampin until 10/26.  The PICC line was removed on 10/4.  His blood cultures remained negative.  He was discharged back to his nursing facility on 10/4/22.      He was sent to [facility] ED on 10/14 after nursing staff noted that his left hallux appeared necrotic.  He has had no fever, chills, shortness of breath, nausea, vomiting, diarrhea, dysuria, or worsening foot pain.  On arrival, he is afebrile and hemodynamically stable.  On 10/15, his BP went up to 203/81.  Admitting labs were WBC 7200 with 69% neutrophils, ESR 67, CRP 3.07, Hgb 9.2, PCT 0.05, lactic acid 2.3, and creatinine 1.21.  A MRSA PCR was negative.  Blood and wound cultures are pending.  An MRI of the left foot on 10/15 showed wound around the left great toe with marrow signal alteration within the distal aspect of 1st MT as well as both phalanges c/w osteomyelitis and scattered foci in soft tissues that may be foci of gas without evidence of fluid collections or abscesses. An Xray of left foot showed soft tissue swelling of 1st digit with displaced fracture of medial base of 1st proximal phalanx.  He is currently on Cefepime and Vancomycin.  ID was asked by Dr. Seth on 10/15 to evaluate and manage his antibiotic therapy.  Patient has had some issues with psychosis over the past 6 months possibly related to drug and alcohol use.  This is also interfered with his acceptance of care.    10/16/2022 history reviewed.  Patient more pleasant this morning.  No high fevers or chills.  Tolerating vancomycin and cefepime, duration to be determined.  Orthopedics following.  Previous history of MRSA.    10/17/22: history reviewed.  Patient with minimal response. Tmax 99.6.  On Vancomycin and Cefepime for left hallux osteomyelitis and surrounding infection with duration to be determined.  Awaiting evaluate by Dr. Petty as per Dr. Lieberman's note.  More cooperative.    10/18/2022 history  reviewed.  Patient awaiting a left hallux ray amputation by orthopedic surgery.  Tolerating vancomycin and cefepime.    10/19/2022 history reviewed.  The patient underwent a first toe ray resection on his left foot by Dr. Yeison Petty on 10/18/2022.  Continues on vancomycin and cefepime until 11/25/2022.  No high fevers or chills.    10/20/2022 history reviewed.  Status post left foot first ray toe resection on 10/18, with previous history of MRSA, and new infection with Raoultella ornithinolytica.  Tolerating vancomycin and ceftriaxone until 11/25/2022.  Waiting on placement.    10/24/2022 history reviewed.  No high fevers.  Status post left foot first ray toe resection on 10/18, with previous history of MRSA, and new infection with Raoultella ornithinolytica.  Continues on ceftriaxone and vancomycin until 11/25/2022.    10/25/2022 history reviewed.  Status post left foot first toe resection 10/18/2022 with cultures previously positive for MRSA, and now for Klebsiella and Raoultella.  Mental status has been a limiting factor with his psychiatric history in terms of treatment.  He is tolerating vancomycin and ceftriaxone to continue until 11/25/2022.  Placement is in progress.    10/26/2022 history reviewed.  Status post left first toe resection 10/18/2022 for osteomyelitis.  Tolerating ceftriaxone and vancomycin treating MRSA, Klebsiella, and other bacteria until 11/25/2022.  Awaiting placement, especially given his mental status and psychiatric history.  No high fever.  Pain controlled.    10/27/2022 history reviewed.  Continues on vancomycin and ceftriaxone until 11/25/2022 for left first toe osteomyelitis.  Status post resection 10/18/2022.  Awaiting placement.  No high fever.    10/31/2022 history reviewed.  Continues on antibiotics for left first toe osteomyelitis until 11/25.  Status post resection 10/18.  Confused off and on.  Awaiting placement.  No fever.    11/1/2022 history reviewed.  Tolerating  ceftriaxone and vancomycin until 11/25/2022 for left first toe osteomyelitis status post resection 10/18.  Pleasant but confused.  Awaiting placement.  No fever.  No pain.    11/2/2022 history reviewed.  On vancomycin and ceftriaxone until 11/25 for left toe osteomyelitis status post resection 10/18/2022.  Placement has been difficult as places or refusing him.  No fever.    11/3/2022 history reviewed.  Tolerating ceftriaxone and vancomycin until 11/25/2022 for left first toe osteomyelitis polymicrobial status post resection 10/18/2022.  Still a difficult placement issue.  No fever.    11/4/2022 history reviewed.  Continues on vancomycin and ceftriaxone until 11/25 for left first toe osteomyelitis polymicrobial, status post amputation and resection 10/18/2022.  More amendable to care.  No fever.  Waiting on placement.    Past Medical History:   Diagnosis Date   • Diabetes mellitus (HCC)    • GERD (gastroesophageal reflux disease)    HTN  Psychotic d/o, unspecified.    Past Surgical History:   Procedure Laterality Date   • AMPUTATION FOOT Left 10/18/2022    Procedure: PARTIAL FIRST RAY AMPUTATION LEFT;  Surgeon: Yeison Petty MD;  Location: [facility];  Service: Orthopedics;  Laterality: Left;   • EYE SURGERY     Left foot debridement/I and D x 2 9/27/22 and 9/30/22.  Left first ray amputation 10/18/2022.    Pediatric History   Patient Parents   • Not on file     Other Topics Concern   • Not on file   Social History Narrative    Caffeine 0-1 servings per day    Patient lives at home .   Patient lives at [facility]  Former smoker, no alcohol, smokes marijuana.     family history includes Diabetes in his brother, brother, father, maternal grandfather, maternal grandmother, mother, and sister; Hypertension in his brother, brother, father, and mother.    No Known Allergies      There is no immunization history on file for this patient.    Medication:    Current Facility-Administered Medications:   •   acetaminophen (TYLENOL) tablet 650 mg, 650 mg, Oral, Q4H PRN, 650 mg at 10/28/22 2012 **OR** acetaminophen (TYLENOL) suppository 650 mg, 650 mg, Rectal, Q4H PRN, Yeison Petty MD  •  amLODIPine (NORVASC) tablet 10 mg, 10 mg, Oral, Q24H, Shahbaz Kuhn MD, 10 mg at 11/03/22 0905  •  bisacodyl (DULCOLAX) suppository 10 mg, 10 mg, Rectal, Daily PRN, Yeison Petty MD  •  cefTRIAXone (ROCEPHIN) 2 g/100 mL 0.9% NS IVPB (MBP), 2 g, Intravenous, Q24H, Rayray Gordon MD, Last Rate: 200 mL/hr at 10/28/22 1109, 2 g at 11/03/22 1138  •  dextrose (D50W) (25 g/50 mL) IV injection 25 g, 25 g, Intravenous, Q15 Min PRN, Yeison Petty MD  •  dextrose (GLUTOSE) oral gel 15 g, 15 g, Oral, Q15 Min PRN, Yeison Petty MD  •  ferrous sulfate tablet 325 mg, 325 mg, Oral, Daily With Breakfast, Yeison Petty MD, 325 mg at 11/03/22 0905  •  glucagon (human recombinant) (GLUCAGEN DIAGNOSTIC) injection 1 mg, 1 mg, Intramuscular, Q15 Min PRN, Yeison Petty MD  •  heparin (porcine) 5000 UNIT/ML injection 5,000 Units, 5,000 Units, Subcutaneous, Q8H, Yesion Petty MD, 5,000 Units at 11/03/22 2154  •  insulin detemir (LEVEMIR) injection 10 Units, 10 Units, Subcutaneous, Nightly, Shahbaz Kuhn MD, 10 Units at 11/03/22 2154  •  Insulin Lispro (humaLOG) injection 0-7 Units, 0-7 Units, Subcutaneous, TID AC, Yeison Petty MD, 2 Units at 11/03/22 1138  •  Insulin Lispro (humaLOG) injection 8 Units, 8 Units, Subcutaneous, TID With Meals, Shahbaz Kuhn MD, 8 Units at 11/03/22 1733  •  lisinopril (PRINIVIL,ZESTRIL) tablet 40 mg, 40 mg, Oral, Daily, Shahbaz Kuhn MD, 40 mg at 11/03/22 0905  •  magnesium sulfate 4 gram infusion - Mg less than or equal to 1mg/dL, 4 g, Intravenous, PRN **OR** magnesium sulfate 3 gram infusion (1gm x 3) - Mg 1.1 - 1.5 mg/dL, 1 g, Intravenous, PRN **OR** Magnesium Sulfate 2 gram infusion- Mg 1.6 - 1.9 mg/dL, 2 g, Intravenous, PRN, Yeison Petty MD  •  melatonin tablet 5 mg, 5 mg, Oral, Nightly PRN,  "Yeison Petty MD, 5 mg at 22 1953  •  [] HYDROmorphone (DILAUDID) injection 0.5 mg, 0.5 mg, Intravenous, Q2H PRN, 0.5 mg at 10/24/22 1449 **AND** naloxone (NARCAN) injection 0.1 mg, 0.1 mg, Intravenous, Q5 Min PRN, Yeison Petty MD  •  OLANZapine (zyPREXA) tablet 5 mg, 5 mg, Oral, Daily, 5 mg at 22 0905 **AND** OLANZapine (zyPREXA) tablet 10 mg, 10 mg, Oral, Nightly, Nicole Henriquez DO, 10 mg at 22  •  polyethylene glycol (MIRALAX) packet 17 g, 17 g, Oral, PRN, Yeison Petty MD, 17 g at 22 08  •  sennosides-docusate (PERICOLACE) 8.6-50 MG per tablet 2 tablet, 2 tablet, Oral, Nightly, Yeison Petty MD, 2 tablet at 22 2019  •  Insert peripheral IV, , , Once **AND** sodium chloride 0.9 % flush 10 mL, 10 mL, Intravenous, PRN, Yeison Petty MD  •  sodium chloride 0.9 % flush 10 mL, 10 mL, Intravenous, Q12H, Nicole Henriquez DO, 10 mL at 22  •  vancomycin in dextrose 5% 150 mL (VANCOCIN) IVPB 750 mg, 750 mg, Intravenous, Q12H, Carolina Arreaga RPH, 750 mg at 22 0220    Please refer to the medical record for a full medication list    Review of Systems:  Unable to get a reliable history as patient is responding with yes and no to basic questions and has a psychotic disorder.  Denies fever, chills, nausea, vomiting, diarrhea, shortness of breath, dysuria, or rashes.    Physical Exam:   Vital Signs   Temp:  [97.8 °F (36.6 °C)-98.6 °F (37 °C)] 97.8 °F (36.6 °C)  Heart Rate:  [71-82] 82  Resp:  [16-20] 20  BP: (154-183)/(63-88) 155/79    Temp  Min: 97.8 °F (36.6 °C)  Max: 98.6 °F (37 °C)  BP  Min: 154/72  Max: 183/88  Pulse  Min: 71  Max: 82  Resp  Min: 16  Max: 20  SpO2  Min: 93 %  Max: 95 %    Blood pressure 155/79, pulse 82, temperature 97.8 °F (36.6 °C), temperature source Oral, resp. rate 20, height 182.9 cm (72\"), weight 86.6 kg (191 lb), SpO2 95 %.  GENERAL: Awake and alert, in minimal distress. Appears older than stated age.  Resting in bed.  Watching " TV.  HEENT:  Normocephalic, atraumatic.  Oropharynx without thrush.   EYES: No conjunctival injection. No icterus. EOM full.  LYMPHATICS: No lymphadenopathy of the neck or axillary or inguinal regions.   HEART: No murmur, gallop, or pericardial friction rub. Reg rate rhythm.  LUNGS: Clear to auscultation and percussion. No respiratory distress, no use of accessory muscles.    ABDOMEN: Soft, nontender, nondistended. No appreciable HSM.  Bowel sounds normal.  SKIN: Warm and dry without cutaneous eruptions.  No nodules. Left foot dressing in place, right arm PICC okay placed 10/27.  Splint in place.  PSYCHIATRIC: Mental status with occasional confusion.  But overall pleasant.  Can follow commands at times.    EXT: Left foot surgical dressing in place.  No crepitus, some drainage from webspace. Normal range of motion.  NEURO: Oriented to name, nonfocal.  Follows some commands.    Results Review:   I reviewed the patient's new clinical results.  I reviewed the patient's new imaging results and agree with the interpretation.  I reviewed the patient's other test results and agree with the interpretation    Results from last 7 days   Lab Units 11/02/22  0844 10/31/22  0535   WBC 10*3/mm3 6.07 9.55   HEMOGLOBIN g/dL 7.7* 7.6*   HEMATOCRIT % 25.4* 24.5*   PLATELETS 10*3/mm3 296 259     Results from last 7 days   Lab Units 11/04/22  0417   SODIUM mmol/L 141   POTASSIUM mmol/L 4.4   CHLORIDE mmol/L 105   CO2 mmol/L 28.0   BUN mg/dL 19   CREATININE mg/dL 0.92   GLUCOSE mg/dL 105*   CALCIUM mg/dL 9.2     Results from last 7 days   Lab Units 10/31/22  0535   ALK PHOS U/L 106   BILIRUBIN mg/dL <0.2   ALT (SGPT) U/L 16   AST (SGOT) U/L 11             Results from last 7 days   Lab Units 10/31/22  1250   VANCOMYCIN RM mcg/mL 21.20         Estimated Creatinine Clearance: 98.1 mL/min (by C-G formula based on SCr of 0.92 mg/dL).  CPK    Common Labsle 10/19/22   Creatine Kinase 119            Procalitonin Results:       Brief Urine Lab  Results  (Last result in the past 365 days)      Color   Clarity   Blood   Leuk Est   Nitrite   Protein   CREAT   Urine HCG        06/12/22 2056 Yellow   Clear     Negative                    No results found for: SITE, ALLENTEST, PHART, EZY4ZRJ, PO2ART, SVW1JMP, BASEEXCESS, G8XUNSYC, HGBBG, HCTABG, OXYHEMOGLOBI, METHHGBN, CARBOXYHGB, CO2CT, BAROMETRIC, MODALITY, FIO2     Microbiology:  Microbiology Results (last 10 days)     Procedure Component Value - Date/Time    COVID PRE-OP / PRE-PROCEDURE SCREENING ORDER (NO ISOLATION) - Swab, Nasopharynx [[id number]]  (Normal) Collected: 10/27/22 0510    Lab Status: Final result Specimen: Swab from Nasopharynx Updated: 10/27/22 0530    Narrative:      The following orders were created for panel order COVID PRE-OP / PRE-PROCEDURE SCREENING ORDER (NO ISOLATION) - Swab, Nasopharynx.  Procedure                               Abnormality         Status                     ---------                               -----------         ------                     COVID-19, [facility]BBOTT IN-HOUS...[[id number]]  Normal              Final result                 Please view results for these tests on the individual orders.    COVID-19, [facility]BBOTT IN-HOUSE,NASAL Swab (NO TRANSPORT MEDIA) 2 HR TAT - Swab, Nasopharynx [[id number]]  (Normal) Collected: 10/27/22 0510    Lab Status: Final result Specimen: Swab from Nasopharynx Updated: 10/27/22 0530     COVID19 Presumptive Negative    Narrative:      Fact sheet for providers: https://www.fda.gov/media/345473/download     Fact sheet for patients: https://www.fda.gov/media/705502/download    Test performed by PCR.  If inconsistent with clinical signs and symptoms patient should be tested with different authorized molecular test.        Blood and wound cultures 10/14 pending.       No results found for: TISSCXQ, CULTURES, CULTURE, BLOODCX, ANACX, BALCX, ACIDFASTCX, BODYFLDCX, CXREFLEX, FUNGUSCX, RESPCX, MRSACX, ROUTCX, BRCHWSHCLT, TISSUECX, URINECX, CMVCX,  WOUNDCX, BCIDPCR, BFCULTURE, BLOODCULT(      Radiology:  Imaging Results (Last 72 Hours)     ** No results found for the last 72 hours. **          IMPRESSION:   1. Left hallux osteomyelitis after trauma/displaced fracture 9/17.  At risk for fracture not healing in the first toe given the infection, noncompliance with walking on foot, and ongoing infection.  Likely gangrene and poor wound healing related to vascular disease.  Making progress after surgery 10/18.  2. 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  Cx with MRSA, Enterococcus faecalis, and Corynebacterium striatum.  Initially treated with Daptomycin and discharge on oral Zyvox and oral Rifampin until 10/26/22.  Cx with Raoultella ornithinolytica (sens to Cefepime, ceftriaxone) and Klebsiella.  Status post left first ray resection 10/18/2022.  Healing.  3. Lactic acidosis, related to above. Resolved.   4. Elevated inflammatory markers, related to above.  CRP 3.07 on 10/14.  CRP 3.10 on 10/26.  5. Type 2 diabetes mellitus/peripheral neuropathy.  Affects wound healing.  6. Hyperglycemia, related to above.  7. Anemia of chronic disease.  Continues.  8. Essential hypertension.  Controlled.  9. Gastroesophageal reflux disease.  10. Thrombocytosis.  Related to above issues.  Resolved.  11. Psychosis since April 2022 reported by the patient's sister, thought to be related to drug and alcohol use.  Was hospitalized previously at [facility].  Awaiting placement.  12. Hyponatremia, 135.  Ongoing.  13. H/o MRSA.  14. Hypocalcemia resolved.  15. Elevated alkaline phosphatase resolved.    Moving forward on current regimen.  Discussed with nursing and family.  Main issue is finding placement at this point.    Plan:  1. Diagnostically, follow blood and wound cultures, imaging, physical examination, CBC, CMP, CRP, and vancomycin levels.   2. Therapeutically, continue ceftriaxone and Vancomycin for 6 weeks of IV  antibiotics until 11/25/22.  This will cover the organisms including previous MRSA.  Vancomycin dose previously increased to 1 g IV every 12 hours.  3. Orthopedic surgery status post left first ray resection for osteomyelitis 10/18/2022.   4. Awaiting placement.  5. Continue supportive care.  PICC placement right arm 10/27/2022.    I discussed the patient's findings and my recommendations with patient and his family.  He will need to be in a skilled facility to receive his antibiotics.    Our group would be pleased to follow this patient over the course of their hospitalization and assist with outpatient antimicrobial therapy, as indicated.  Further recommendations depend on the results of the cultures and clinical course. Discussed with patient's sister and family.  Difficult issue with compliance in terms of wound care and staying off his foot.      See next on Monday, call sooner if needed.    Case management orders: Please arrange for skilled facility IV antibiotics with ceftriaxone 2 g IV daily, vancomycin 1 g IV every 12 hours to continue until 11/25/2022.  Pharmacy to adjust dose of vancomycin based on weekly trough levels to try to maintain level between 12 and 18.  Check CBC, CMP, CRP, vancomycin trough weekly while on IV antibiotics.  Fax orders to [phone], call [phone] with final arrangements.  The treatment is for left first toe osteomyelitis with MRSA, Klebsiella, and Raoultella ornithinolytica.  Arrange for follow-up with me in 2 weeks post discharge.  Weekly PICC dressing changes.    Rayray Gordon MD  11/4/2022

## 2022-11-04 NOTE — PLAN OF CARE
Goal Outcome Evaluation:   VSS. Alert. Confused. Disoriented x4. Redirected. Ambulated to BSC assist x1, stand pivot NWB on LLE. Urinal within reach. No complaints of pain.

## 2022-11-05 LAB
DEPRECATED RDW RBC AUTO: 47.3 FL (ref 37–54)
ERYTHROCYTE [DISTWIDTH] IN BLOOD BY AUTOMATED COUNT: 13.9 % (ref 12.3–15.4)
GLUCOSE BLDC GLUCOMTR-MCNC: 145 MG/DL (ref 70–130)
GLUCOSE BLDC GLUCOMTR-MCNC: 207 MG/DL (ref 70–130)
GLUCOSE BLDC GLUCOMTR-MCNC: 90 MG/DL (ref 70–130)
HCT VFR BLD AUTO: 26.4 % (ref 37.5–51)
HGB BLD-MCNC: 7.9 G/DL (ref 13–17.7)
MCH RBC QN AUTO: 27.4 PG (ref 26.6–33)
MCHC RBC AUTO-ENTMCNC: 29.9 G/DL (ref 31.5–35.7)
MCV RBC AUTO: 91.7 FL (ref 79–97)
PLATELET # BLD AUTO: 388 10*3/MM3 (ref 140–450)
PMV BLD AUTO: 10.4 FL (ref 6–12)
RBC # BLD AUTO: 2.88 10*6/MM3 (ref 4.14–5.8)
WBC NRBC COR # BLD: 5.79 10*3/MM3 (ref 3.4–10.8)

## 2022-11-05 PROCEDURE — 63710000001 INSULIN DETEMIR PER 5 UNITS: Performed by: PHYSICIAN ASSISTANT

## 2022-11-05 PROCEDURE — 25010000002 VANCOMYCIN PER 500 MG

## 2022-11-05 PROCEDURE — 63710000001 INSULIN LISPRO (HUMAN) PER 5 UNITS: Performed by: INTERNAL MEDICINE

## 2022-11-05 PROCEDURE — 99232 SBSQ HOSP IP/OBS MODERATE 35: CPT | Performed by: PHYSICIAN ASSISTANT

## 2022-11-05 PROCEDURE — 25010000002 CEFTRIAXONE PER 250 MG: Performed by: INTERNAL MEDICINE

## 2022-11-05 PROCEDURE — 85027 COMPLETE CBC AUTOMATED: CPT | Performed by: HOSPITALIST

## 2022-11-05 PROCEDURE — 82962 GLUCOSE BLOOD TEST: CPT

## 2022-11-05 PROCEDURE — 25010000002 HEPARIN (PORCINE) PER 1000 UNITS: Performed by: ORTHOPAEDIC SURGERY

## 2022-11-05 RX ORDER — AMOXICILLIN 250 MG
2 CAPSULE ORAL 2 TIMES DAILY
Status: DISCONTINUED | OUTPATIENT
Start: 2022-11-05 | End: 2022-11-16 | Stop reason: HOSPADM

## 2022-11-05 RX ORDER — LORAZEPAM 2 MG/ML
0.25 INJECTION INTRAMUSCULAR ONCE AS NEEDED
Status: DISCONTINUED | OUTPATIENT
Start: 2022-11-05 | End: 2022-11-05

## 2022-11-05 RX ORDER — INSULIN LISPRO 100 [IU]/ML
6 INJECTION, SOLUTION INTRAVENOUS; SUBCUTANEOUS
Status: DISCONTINUED | OUTPATIENT
Start: 2022-11-05 | End: 2022-11-05

## 2022-11-05 RX ADMIN — Medication 10 ML: at 08:17

## 2022-11-05 RX ADMIN — VANCOMYCIN HYDROCHLORIDE 750 MG: 750 INJECTION, SOLUTION INTRAVENOUS at 02:26

## 2022-11-05 RX ADMIN — OLANZAPINE 10 MG: 5 TABLET, FILM COATED ORAL at 20:45

## 2022-11-05 RX ADMIN — SODIUM CHLORIDE 2 G: 900 INJECTION INTRAVENOUS at 12:11

## 2022-11-05 RX ADMIN — HEPARIN SODIUM 5000 UNITS: 5000 INJECTION INTRAVENOUS; SUBCUTANEOUS at 14:18

## 2022-11-05 RX ADMIN — AMLODIPINE BESYLATE 10 MG: 10 TABLET ORAL at 08:17

## 2022-11-05 RX ADMIN — VANCOMYCIN HYDROCHLORIDE 750 MG: 750 INJECTION, SOLUTION INTRAVENOUS at 14:18

## 2022-11-05 RX ADMIN — LISINOPRIL 40 MG: 40 TABLET ORAL at 08:17

## 2022-11-05 RX ADMIN — OLANZAPINE 5 MG: 5 TABLET, FILM COATED ORAL at 08:16

## 2022-11-05 RX ADMIN — METFORMIN HYDROCHLORIDE 500 MG: 500 TABLET ORAL at 18:24

## 2022-11-05 RX ADMIN — INSULIN LISPRO 8 UNITS: 100 INJECTION, SOLUTION INTRAVENOUS; SUBCUTANEOUS at 08:17

## 2022-11-05 RX ADMIN — FERROUS SULFATE TAB 325 MG (65 MG ELEMENTAL FE) 325 MG: 325 (65 FE) TAB at 08:17

## 2022-11-05 RX ADMIN — ACETAMINOPHEN 650 MG: 325 TABLET, FILM COATED ORAL at 14:48

## 2022-11-05 RX ADMIN — HEPARIN SODIUM 5000 UNITS: 5000 INJECTION INTRAVENOUS; SUBCUTANEOUS at 20:45

## 2022-11-05 RX ADMIN — INSULIN LISPRO 8 UNITS: 100 INJECTION, SOLUTION INTRAVENOUS; SUBCUTANEOUS at 12:11

## 2022-11-05 RX ADMIN — INSULIN DETEMIR 10 UNITS: 100 INJECTION, SOLUTION SUBCUTANEOUS at 20:45

## 2022-11-05 RX ADMIN — Medication 5 MG: at 20:45

## 2022-11-05 RX ADMIN — SENNOSIDES AND DOCUSATE SODIUM 2 TABLET: 50; 8.6 TABLET ORAL at 20:45

## 2022-11-05 NOTE — PLAN OF CARE
Problem: Adult Inpatient Plan of Care  Goal: Patient-Specific Goal (Individualized)  Outcome: Ongoing, Progressing  Goal: Readiness for Transition of Care  Outcome: Ongoing, Progressing     Problem: BADL (Basic Activities of Daily Living) Impairment (Lower Extremity Amputation)  Goal: Optimal Safe BADL Performance  Outcome: Ongoing, Progressing  Goal: Optimal Safe BADL Performance  Outcome: Ongoing, Progressing     Problem: IADL (Instrumental Activities of Daily Living) Impairment (Lower Extremity Amputation)  Goal: Optimal Safe IADL Performance  Outcome: Ongoing, Progressing  Goal: Optimal Safe IADL Performance  Outcome: Ongoing, Progressing     Problem: Lower Limb Care (Lower Extremity Amputation)  Goal: Effective Lower Limb Care  Outcome: Ongoing, Progressing  Goal: Effective Lower Limb Care  Outcome: Ongoing, Progressing     Problem: Mobility Impairment (Lower Extremity Amputation)  Goal: Optimal Mobility [place] and Safety  Outcome: Ongoing, Progressing  Goal: Optimal Mobility [place] and Safety  Outcome: Ongoing, Progressing     Problem: Pain and Hypersensitivity (Lower Extremity Amputation)  Goal: Acceptable Pain/Hypersensitivity Control  Outcome: Ongoing, Progressing  Goal: Acceptable Pain/Hypersensitivity Control  Outcome: Ongoing, Progressing     Problem: Adjustment to Surgery (Extremity Amputation)  Goal: Optimal Coping with Amputation  Outcome: Ongoing, Progressing  Intervention: Support Psychsocial Response  Recent Flowsheet Documentation  Taken 11/5/2022 0800 by Kathy Lowry RN  Family/Support System Care:   self-care encouraged   support provided  Goal: Optimal Coping with Amputation  Outcome: Ongoing, Progressing  Intervention: Support Psychsocial Response  Recent Flowsheet Documentation  Taken 11/5/2022 0800 by Kathy Lowry RN  Family/Support System Care:   self-care encouraged   support provided     Problem: Bleeding (Extremity Amputation)  Goal: Absence of Bleeding  Outcome:  Ongoing, Progressing  Intervention: Monitor and Manage Bleeding  Recent Flowsheet Documentation  Taken 11/5/2022 0800 by Kathy Lowry RN  Bleeding Management: affected area elevated  Goal: Absence of Bleeding  Outcome: Ongoing, Progressing  Intervention: Monitor and Manage Bleeding  Recent Flowsheet Documentation  Taken 11/5/2022 0800 by Kathy Lowry RN  Bleeding Management: affected area elevated     Problem: Bowel Motility Impaired (Extremity Amputation)  Goal: Effective Bowel Elimination  Outcome: Ongoing, Progressing  Goal: Effective Bowel Elimination  Outcome: Ongoing, Progressing     Problem: Fluid and Electrolyte Imbalance (Extremity Amputation)  Goal: Fluid and Electrolyte Balance  Outcome: Ongoing, Progressing  Goal: Fluid and Electrolyte Balance  Outcome: Ongoing, Progressing     Problem: Functional Ability Impaired (Extremity Amputation)  Goal: Optimal Functional Ability  Outcome: Ongoing, Progressing  Intervention: Optimize Functional Ability  Recent Flowsheet Documentation  Taken 11/5/2022 1821 by Kathy Lowry RN  Activity Management: activity adjusted per tolerance  Self-Care Promotion: independence encouraged  Taken 11/5/2022 1623 by Kathy Lowry RN  Activity Management: activity adjusted per tolerance  Self-Care Promotion: independence encouraged  Taken 11/5/2022 1427 by Kathy Lowry RN  Activity Management: activity adjusted per tolerance  Self-Care Promotion: independence encouraged  Taken 11/5/2022 1230 by Kathy Lowry RN  Activity Management: activity adjusted per tolerance  Self-Care Promotion: independence encouraged  Taken 11/5/2022 1033 by Kathy Lowry RN  Activity Management: activity adjusted per tolerance  Self-Care Promotion: independence encouraged  Taken 11/5/2022 0800 by Kathy Lowry RN  Activity Management: activity adjusted per tolerance  Self-Care Promotion: independence encouraged  Goal: Optimal Functional Ability  Outcome: Ongoing,  Progressing  Intervention: Optimize Functional Ability  Recent Flowsheet Documentation  Taken 11/5/2022 1821 by Kathy Lowry RN  Activity Management: activity adjusted per tolerance  Self-Care Promotion: independence encouraged  Taken 11/5/2022 1623 by Kathy Lowry RN  Activity Management: activity adjusted per tolerance  Self-Care Promotion: independence encouraged  Taken 11/5/2022 1427 by Kathy Lowry RN  Activity Management: activity adjusted per tolerance  Self-Care Promotion: independence encouraged  Taken 11/5/2022 1230 by Kathy Lowry RN  Activity Management: activity adjusted per tolerance  Self-Care Promotion: independence encouraged  Taken 11/5/2022 1033 by Kathy Lowry RN  Activity Management: activity adjusted per tolerance  Self-Care Promotion: independence encouraged  Taken 11/5/2022 0800 by Kathy Lowry RN  Activity Management: activity adjusted per tolerance  Self-Care Promotion: independence encouraged     Problem: Infection (Extremity Amputation)  Goal: Absence of Infection Signs and Symptoms  Outcome: Ongoing, Progressing  Intervention: Prevent or Manage Infection  Recent Flowsheet Documentation  Taken 11/5/2022 1821 by Kathy Lowry RN  Infection Prevention:   hand hygiene promoted   rest/sleep promoted  Taken 11/5/2022 1623 by Kathy Lowry RN  Infection Prevention:   hand hygiene promoted   rest/sleep promoted  Taken 11/5/2022 1427 by Kathy Lowry RN  Infection Prevention:   hand hygiene promoted   rest/sleep promoted  Taken 11/5/2022 1230 by Kathy Lowry RN  Infection Prevention:   hand hygiene promoted   rest/sleep promoted  Taken 11/5/2022 1033 by Kathy Lowry RN  Infection Prevention:   hand hygiene promoted   rest/sleep promoted  Taken 11/5/2022 0800 by Kathy Lowry RN  Infection Management: aseptic technique maintained  Infection Prevention:   hand hygiene promoted   rest/sleep promoted  Goal: Absence of Infection Signs and Symptoms  Outcome:  Ongoing, Progressing  Intervention: Prevent or Manage Infection  Recent Flowsheet Documentation  Taken 11/5/2022 1821 by Kathy Lowry RN  Infection Prevention:   hand hygiene promoted   rest/sleep promoted  Taken 11/5/2022 1623 by Kathy Lowry RN  Infection Prevention:   hand hygiene promoted   rest/sleep promoted  Taken 11/5/2022 1427 by Kathy Lowry RN  Infection Prevention:   hand hygiene promoted   rest/sleep promoted  Taken 11/5/2022 1230 by Kathy Lowry RN  Infection Prevention:   hand hygiene promoted   rest/sleep promoted  Taken 11/5/2022 1033 by Kathy Lowry RN  Infection Prevention:   hand hygiene promoted   rest/sleep promoted  Taken 11/5/2022 0800 by Kathy Lowry RN  Infection Management: aseptic technique maintained  Infection Prevention:   hand hygiene promoted   rest/sleep promoted     Problem: Ongoing Anesthesia Effects (Extremity Amputation)  Goal: Anesthesia/Sedation Recovery  Outcome: Ongoing, Progressing  Intervention: Optimize Anesthesia Recovery  Recent Flowsheet Documentation  Taken 11/5/2022 1821 by Kathy Lowry RN  Safety Promotion/Fall Prevention:   activity supervised   assistive device/personal items within reach   safety round/check completed  Taken 11/5/2022 1623 by Kathy Lowry RN  Safety Promotion/Fall Prevention:   activity supervised   assistive device/personal items within reach   safety round/check completed  Taken 11/5/2022 1427 by Kathy Lowry RN  Safety Promotion/Fall Prevention:   activity supervised   assistive device/personal items within reach   safety round/check completed  Taken 11/5/2022 1230 by Kathy Lowry RN  Safety Promotion/Fall Prevention:   activity supervised   assistive device/personal items within reach   safety round/check completed  Taken 11/5/2022 1033 by Kathy Lowry RN  Safety Promotion/Fall Prevention:   activity supervised   assistive device/personal items within reach   safety round/check completed  Taken  11/5/2022 0800 by Kathy Lowry RN  Stabilization Measures: airway opened  Safety Promotion/Fall Prevention:   activity supervised   assistive device/personal items within reach   clutter free environment maintained   fall prevention program maintained   lighting adjusted   muscle strengthening facilitated   nonskid shoes/slippers when out of bed   room organization consistent   safety round/check completed  Administration (IS): refused  Goal: Anesthesia/Sedation Recovery  Outcome: Ongoing, Progressing  Intervention: Optimize Anesthesia Recovery  Recent Flowsheet Documentation  Taken 11/5/2022 1821 by Kathy Lowry RN  Safety Promotion/Fall Prevention:   activity supervised   assistive device/personal items within reach   safety round/check completed  Taken 11/5/2022 1623 by Kathy Lowry RN  Safety Promotion/Fall Prevention:   activity supervised   assistive device/personal items within reach   safety round/check completed  Taken 11/5/2022 1427 by Kathy Lowry RN  Safety Promotion/Fall Prevention:   activity supervised   assistive device/personal items within reach   safety round/check completed  Taken 11/5/2022 1230 by Kathy Lowry RN  Safety Promotion/Fall Prevention:   activity supervised   assistive device/personal items within reach   safety round/check completed  Taken 11/5/2022 1033 by Kathy Lowry RN  Safety Promotion/Fall Prevention:   activity supervised   assistive device/personal items within reach   safety round/check completed  Taken 11/5/2022 0800 by Kathy Lowry RN  Stabilization Measures: airway opened  Safety Promotion/Fall Prevention:   activity supervised   assistive device/personal items within reach   clutter free environment maintained   fall prevention program maintained   lighting adjusted   muscle strengthening facilitated   nonskid shoes/slippers when out of bed   room organization consistent   safety round/check completed  Administration (IS): refused      Problem: Pain (Extremity Amputation)  Goal: Acceptable Pain Control  Outcome: Ongoing, Progressing  Intervention: Prevent or Manage Pain  Recent Flowsheet Documentation  Taken 11/5/2022 0800 by Kathy Lowry RN  Diversional Activities: television  Goal: Acceptable Pain Control  Outcome: Ongoing, Progressing  Intervention: Prevent or Manage Pain  Recent Flowsheet Documentation  Taken 11/5/2022 0800 by Kathy Lowry RN  Diversional Activities: television     Problem: Postoperative Urinary Retention (Extremity Amputation)  Goal: Effective Urinary Elimination  Outcome: Ongoing, Progressing  Goal: Effective Urinary Elimination  Outcome: Ongoing, Progressing     Problem: Residual Limb Care (Extremity Amputation)  Goal: Optimal Residual Limb Healing  Outcome: Ongoing, Progressing  Goal: Optimal Residual Limb Healing  Outcome: Ongoing, Progressing     Problem: Skin Injury Risk Increased  Goal: Skin Health and Integrity  Outcome: Ongoing, Progressing  Intervention: Optimize Skin Protection  Recent Flowsheet Documentation  Taken 11/5/2022 1821 by Kathy Lowry RN  Head of Bed (HOB) Positioning: HOB elevated  Taken 11/5/2022 1623 by Kathy Lowry RN  Head of Bed (HOB) Positioning: HOB at 15 degrees  Taken 11/5/2022 1427 by Kathy Lowry RN  Head of Bed (HOB) Positioning: HOB elevated  Taken 11/5/2022 1230 by Kathy Lowry RN  Head of Bed (HOB) Positioning: HOB elevated  Taken 11/5/2022 1033 by Kathy Lowry RN  Head of Bed (HOB) Positioning: HOB at 15 degrees  Taken 11/5/2022 0800 by Kathy Lowry RN  Pressure Reduction Techniques: frequent weight shift encouraged  Head of Bed (HOB) Positioning: HOB elevated  Pressure Reduction Devices: pressure-redistributing mattress utilized  Skin Protection:   adhesive use limited   incontinence pads utilized  Goal: Skin Health and Integrity  Outcome: Ongoing, Progressing  Intervention: Optimize Skin Protection  Recent Flowsheet Documentation  Taken 11/5/2022 1821  by Kathy Lowry RN  Head of Bed (HOB) Positioning: HOB elevated  Taken 11/5/2022 1623 by Kathy Lowry RN  Head of Bed (HOB) Positioning: HOB at 15 degrees  Taken 11/5/2022 1427 by Kathy Lowry RN  Head of Bed (HOB) Positioning: HOB elevated  Taken 11/5/2022 1230 by Kathy Lowry RN  Head of Bed (HOB) Positioning: HOB elevated  Taken 11/5/2022 1033 by Kathy Lowry RN  Head of Bed (HOB) Positioning: HOB at 15 degrees  Taken 11/5/2022 0800 by Kathy Lowry RN  Pressure Reduction Techniques: frequent weight shift encouraged  Head of Bed (HOB) Positioning: HOB elevated  Pressure Reduction Devices: pressure-redistributing mattress utilized  Skin Protection:   adhesive use limited   incontinence pads utilized     Problem: Fall Injury Risk  Goal: Absence of Fall and Fall-Related Injury  Outcome: Ongoing, Progressing  Intervention: Identify and Manage Contributors  Recent Flowsheet Documentation  Taken 11/5/2022 1821 by Kathy Lowry RN  Self-Care Promotion: independence encouraged  Taken 11/5/2022 1623 by Kathy Lowry RN  Self-Care Promotion: independence encouraged  Taken 11/5/2022 1427 by Kathy Lowry RN  Self-Care Promotion: independence encouraged  Taken 11/5/2022 1230 by Kathy Lowry RN  Self-Care Promotion: independence encouraged  Taken 11/5/2022 1033 by Kathy Lowry RN  Self-Care Promotion: independence encouraged  Taken 11/5/2022 0800 by Kathy Lowry RN  Medication Review/Management: medications reviewed  Self-Care Promotion: independence encouraged  Intervention: Promote Injury-Free Environment  Recent Flowsheet Documentation  Taken 11/5/2022 1821 by Kathy Lowry RN  Safety Promotion/Fall Prevention:   activity supervised   assistive device/personal items within reach   safety round/check completed  Taken 11/5/2022 1623 by Kathy Lowry RN  Safety Promotion/Fall Prevention:   activity supervised   assistive device/personal items within reach   safety round/check  completed  Taken 11/5/2022 1427 by Kathy oLwry RN  Safety Promotion/Fall Prevention:   activity supervised   assistive device/personal items within reach   safety round/check completed  Taken 11/5/2022 1230 by Kathy Lowry RN  Safety Promotion/Fall Prevention:   activity supervised   assistive device/personal items within reach   safety round/check completed  Taken 11/5/2022 1033 by Kathy Lowry RN  Safety Promotion/Fall Prevention:   activity supervised   assistive device/personal items within reach   safety round/check completed  Taken 11/5/2022 0800 by Kathy Lowry RN  Safety Promotion/Fall Prevention:   activity supervised   assistive device/personal items within reach   clutter free environment maintained   fall prevention program maintained   lighting adjusted   muscle strengthening facilitated   nonskid shoes/slippers when out of bed   room organization consistent   safety round/check completed  Goal: Absence of Fall and Fall-Related Injury  Outcome: Ongoing, Progressing  Intervention: Identify and Manage Contributors  Recent Flowsheet Documentation  Taken 11/5/2022 1821 by Kathy Lowry RN  Self-Care Promotion: independence encouraged  Taken 11/5/2022 1623 by Kathy Lowry RN  Self-Care Promotion: independence encouraged  Taken 11/5/2022 1427 by Kathy Lowry RN  Self-Care Promotion: independence encouraged  Taken 11/5/2022 1230 by Kathy Lowry RN  Self-Care Promotion: independence encouraged  Taken 11/5/2022 1033 by Kathy Lowry RN  Self-Care Promotion: independence encouraged  Taken 11/5/2022 0800 by Kathy Lowry RN  Medication Review/Management: medications reviewed  Self-Care Promotion: independence encouraged  Intervention: Promote Injury-Free Environment  Recent Flowsheet Documentation  Taken 11/5/2022 1821 by Kathy Lowry RN  Safety Promotion/Fall Prevention:   activity supervised   assistive device/personal items within reach   safety round/check  completed  Taken 11/5/2022 1623 by Kathy Lowry RN  Safety Promotion/Fall Prevention:   activity supervised   assistive device/personal items within reach   safety round/check completed  Taken 11/5/2022 1427 by Kathy Lowry RN  Safety Promotion/Fall Prevention:   activity supervised   assistive device/personal items within reach   safety round/check completed  Taken 11/5/2022 1230 by Kathy Lowry RN  Safety Promotion/Fall Prevention:   activity supervised   assistive device/personal items within reach   safety round/check completed  Taken 11/5/2022 1033 by Kathy Lowry RN  Safety Promotion/Fall Prevention:   activity supervised   assistive device/personal items within reach   safety round/check completed  Taken 11/5/2022 0800 by Kathy Lowry RN  Safety Promotion/Fall Prevention:   activity supervised   assistive device/personal items within reach   clutter free environment maintained   fall prevention program maintained   lighting adjusted   muscle strengthening facilitated   nonskid shoes/slippers when out of bed   room organization consistent   safety round/check completed     Problem: Postoperative Nausea and Vomiting (Extremity Amputation)  Goal: Nausea and Vomiting Relief  Outcome: Ongoing, Progressing     Problem: Pain Acute  Goal: Acceptable Pain Control and Functional Ability  Outcome: Ongoing, Progressing  Intervention: Prevent or Manage Pain  Recent Flowsheet Documentation  Taken 11/5/2022 0800 by Kathy Lowry RN  Medication Review/Management: medications reviewed  Intervention: Optimize Psychosocial Wellbeing  Recent Flowsheet Documentation  Taken 11/5/2022 0800 by Kathy Lowry RN  Diversional Activities: television     Problem: Adjustment to Amputation (Lower Extremity Amputation)  Goal: Optimal Adjustment to Amputation  Outcome: Ongoing, Progressing  Intervention: Promote Patient and Family Adjustment to Amputation  Recent Flowsheet Documentation  Taken 11/5/2022 0800 by  Kathy Lowry, RN  Family/Support System Care:   self-care encouraged   support provided     Problem: Prosthetic Use and Management (Lower Extremity Amputation)  Goal: Effective Prosthesis Use and Management  Outcome: Ongoing, Progressing   Goal Outcome Evaluation:      Pt currently in bed resting quietly. No complaints of pain or discomfort at this time. Cast to LLE CDI. Vitals WNL on RA. Pt has been restless at time but easily reoriented. PICC site CDI. CHG given. No other observations at this time. Will continue to monitor, call bell in reach.

## 2022-11-05 NOTE — PROGRESS NOTES
[facility] Medicine Services  PROGRESS NOTE    Patient Name: Omkar Nava  : 1955  MRN: [id number]    Date of Admission: 10/14/2022  Primary Care Physician: Provider, No Known    Subjective     CC: L foot osteomyelitis     HPI:  Per nursing noted, patient was agitated this morning without precipitating factors. Did not receive any PRNs and calmed down with supportive care. This afternoon, he was resting comfortably in bed with sister at bedside. No pain. Says he had a good lunch. Bowels moving a little slower than he would like.     ROS:  Gen- No fevers, chills  CV- No chest pain, palpitations  Resp- No cough, dyspnea  GI- No N/V/D, abd pain    Objective     Vital Signs:   Temp:  [98.4 °F (36.9 °C)-98.7 °F (37.1 °C)] 98.7 °F (37.1 °C)  Heart Rate:  [69-89] 69  Resp:  [18-20] 18  BP: (147-152)/(64-75) 147/64     Physical Exam:  Constitutional: No acute distress, awake, alert and conversant. Sitting in chair   HENT: NCAT, mucous membranes moist  Respiratory: Clear to auscultation bilaterally, respiratory effort normal on room air  Cardiovascular: RRR, no murmurs, rubs, or gallops  Gastrointestinal: Positive bowel sounds, soft, nontender, nondistended  Musculoskeletal: L foot in splint - able to wiggle toes.   Psychiatric: Appropriate affect, cooperative  Neurologic: Moves all extremities spontaneously without focal deficits. Speech clear and coherent     Results Reviewed:  LAB RESULTS:      Lab 22  1111 22  0844 10/31/22  0535   WBC 5.79 6.07 9.55   HEMOGLOBIN 7.9* 7.7* 7.6*   HEMATOCRIT 26.4* 25.4* 24.5*   PLATELETS 388 296 259   NEUTROS ABS  --   --  7.16*   IMMATURE GRANS (ABS)  --   --  0.04   LYMPHS ABS  --   --  1.45   MONOS ABS  --   --  0.72   EOS ABS  --   --  0.15   MCV 91.7 91.7 90.4         Lab 22  0417 22  0844 10/31/22  0535   SODIUM 141 135* 132*   POTASSIUM 4.4 4.1 3.7   CHLORIDE 105 101 98   CO2 28.0 24.0 24.0   ANION GAP 8.0 10.0 10.0   BUN  19 22 19   CREATININE 0.92 1.00 1.07   EGFR 92.3 83.5 77.0   GLUCOSE 105* 174* 159*   CALCIUM 9.2 9.0 8.6         Lab 10/31/22  0535   TOTAL PROTEIN 6.8   ALBUMIN 2.90*   GLOBULIN 3.9   ALT (SGPT) 16   AST (SGOT) 11   BILIRUBIN <0.2   ALK PHOS 106     Brief Urine Lab Results  (Last result in the past 365 days)      Color   Clarity   Blood   Leuk Est   Nitrite   Protein   CREAT   Urine HCG        06/12/22 2056 Yellow   Clear     Negative                   Microbiology Results Abnormal     Procedure Component Value - Date/Time    Fungus Culture - Tissue, Toe, Left [[id number]] Collected: 10/18/22 1608    Lab Status: Preliminary result Specimen: Tissue from Toe, Left Updated: 11/01/22 1815     Fungus Culture No fungus isolated at 2 weeks    AFB Culture - Tissue, Toe, Left [[id number]] Collected: 10/18/22 1608    Lab Status: Preliminary result Specimen: Tissue from Toe, Left Updated: 11/01/22 1815     AFB Culture No AFB isolated at 2 weeks     AFB Stain No acid fast bacilli seen on concentrated smear    Fungus Culture - Tissue, Toe, Left [[id number]] Collected: 10/18/22 1552    Lab Status: Preliminary result Specimen: Tissue from Toe, Left Updated: 11/01/22 1801     Fungus Culture No fungus isolated at 2 weeks    AFB Culture - Tissue, Toe, Left [[id number]] Collected: 10/18/22 1552    Lab Status: Preliminary result Specimen: Tissue from Toe, Left Updated: 11/01/22 1801     AFB Culture No AFB isolated at 2 weeks     AFB Stain No acid fast bacilli seen on concentrated smear    COVID PRE-OP / PRE-PROCEDURE SCREENING ORDER (NO ISOLATION) - Swab, Nasopharynx [[id number]]  (Normal) Collected: 10/27/22 0510    Lab Status: Final result Specimen: Swab from Nasopharynx Updated: 10/27/22 0530    Narrative:      The following orders were created for panel order COVID PRE-OP / PRE-PROCEDURE SCREENING ORDER (NO ISOLATION) - Swab, Nasopharynx.  Procedure                               Abnormality         Status                     ---------                                -----------         ------                     COVID-19, ABBOTT IN-HOUS...[[id number]]  Normal              Final result                 Please view results for these tests on the individual orders.    COVID-19, ABBOTT IN-HOUSE,NASAL Swab (NO TRANSPORT MEDIA) 2 HR TAT - Swab, Nasopharynx [[id number]]  (Normal) Collected: 10/27/22 0510    Lab Status: Final result Specimen: Swab from Nasopharynx Updated: 10/27/22 0530     COVID19 Presumptive Negative    Narrative:      Fact sheet for providers: https://www.fda.gov/media/852850/download     Fact sheet for patients: https://www.fda.gov/media/493859/download    Test performed by PCR.  If inconsistent with clinical signs and symptoms patient should be tested with different authorized molecular test.    Anaerobic Culture - Tissue, Toe, Left [[id number]]  (Normal) Collected: 10/18/22 1608    Lab Status: Final result Specimen: Tissue from Toe, Left Updated: 10/23/22 0543     Anaerobic Culture No anaerobes isolated at 5 days    Anaerobic Culture - Tissue, Toe, Left [[id number]]  (Normal) Collected: 10/18/22 1552    Lab Status: Final result Specimen: Tissue from Toe, Left Updated: 10/23/22 0543     Anaerobic Culture No anaerobes isolated at 5 days    Tissue / Bone Culture - Tissue, Toe, Left [[id number]] Collected: 10/18/22 1552    Lab Status: Final result Specimen: Tissue from Toe, Left Updated: 10/21/22 0840     Tissue Culture No growth at 3 days     Gram Stain Moderate (3+) WBCs seen      No organisms seen    Blood Culture - Blood, Hand, Left [[id number]]  (Normal) Collected: 10/14/22 2115    Lab Status: Final result Specimen: Blood from Hand, Left Updated: 10/19/22 2215     Blood Culture No growth at 5 days    Blood Culture - Blood, Arm, Left [[id number]]  (Normal) Collected: 10/14/22 2115    Lab Status: Final result Specimen: Blood from Arm, Left Updated: 10/19/22 2215     Blood Culture No growth at 5 days    MRSA Screen, PCR (Inpatient) - Swab,  Nares [[id number]]  (Normal) Collected: 10/14/22 0725    Lab Status: Final result Specimen: Swab from Nares Updated: 10/15/22 3017     MRSA PCR Negative    Narrative:      The negative predictive value of this diagnostic test is high and should only be used to consider de-escalating anti-MRSA therapy. A positive result may indicate colonization with MRSA and must be correlated clinically.  MRSA Negative        No radiology results from the last 24 hrs    I have reviewed the medications:  Scheduled Meds:amLODIPine, 10 mg, Oral, Q24H  cefTRIAXone, 2 g, Intravenous, Q24H  ferrous sulfate, 325 mg, Oral, Daily With Breakfast  heparin (porcine), 5,000 Units, Subcutaneous, Q8H  insulin detemir, 12 Units, Subcutaneous, Nightly  insulin lispro, 0-7 Units, Subcutaneous, TID AC  Insulin Lispro, 6 Units, Subcutaneous, TID With Meals  lisinopril, 40 mg, Oral, Daily  OLANZapine, 5 mg, Oral, Daily   And  OLANZapine, 10 mg, Oral, Nightly  senna-docusate sodium, 2 tablet, Oral, BID  sodium chloride, 10 mL, Intravenous, Q12H  vancomycin, 750 mg, Intravenous, Q12H      Continuous Infusions:   PRN Meds:.•  acetaminophen **OR** acetaminophen  •  bisacodyl  •  dextrose  •  dextrose  •  glucagon (human recombinant)  •  magnesium sulfate **OR** magnesium sulfate in D5W 1g/100mL (PREMIX) **OR** magnesium sulfate  •  melatonin  •  [] HYDROmorphone **AND** naloxone  •  polyethylene glycol  •  Insert peripheral IV **AND** sodium chloride    Assessment & Plan     Active Hospital Problems    Diagnosis  POA   • **Acute osteomyelitis of left foot (HCC) [M86.172]  Yes   • Neurocognitive disorder [R41.9]  Unknown   • Type 2 diabetes mellitus (HCC) [E11.9]  Yes   • Essential hypertension [I10]  Yes   • Psychotic disorder (HCC) [F29]  Yes      Resolved Hospital Problems   No resolved problems to display.     Brief Hospital Course to date:  Omkar Nava is a 67 y.o. male with PMH significant for HTN, DMII and unspecified psychotic disorder. He  previously worked as a  but has been unable to work for past 2-3 years and has become homeless.     His health declined in June 2022 with an acute psychotic event with subsequent psychiatric hospitalization at [facility] in [facility] from June to August 2022. He was subsequently transferred to a nursing facility in [place]. On 9/17/22, daughter visited him at the nursing home and he had a significant injury to his L great toe, causing an open articular fracture. He was admitted to [facility] and treated at that time with bedside flushing and repair by podiatry. He was given Ancef and sent back to nursing facility on Keflex. Wound cultures at that time grew MRSA, however blood culture showed no growth. On 9/27/2022, he presented to [facility] in [place] due to worsening foot wound and drainage. He underwent L foot I&D and debridement on 9/30/22. A PICC was placed on 10/3/22. He was to receive Daptomycin but due to expense, this was changed to Rifampin and Linezolid and PICC was removed. He was discharged to SNF in [facility] on 10/6/22 and at some point, was transferred to [place], in order to be closer to family. Daughter visited him on 10/14/22 and became concerned about the appearance of his toe. She brought him to [facility] ED for further evaluation. ID and orthopedic surgery follow.     Osteomyelitis of left foot / L first toe (s/p amputation)  Wound culture (+) Raoultella ornithinolytica   - 10/15/22 MRI of L foot showed wound around the great toe with geographic marrow signal alteration within the distal aspect of the first metatarsal as well as both phalanges of the great toe indicative of osteomyelitis  - He is s/p L great toe amputation on 10/18/22 by Dr. Petty  - DAMARIS to LLE. Elevated LLE. Keep splint clean and dry. SQ heparin for DVT PPX while inpatient. Discharge on ASA 325mg daily x 30 days  - Dressing changed 11/11/22. Has  outpatient follow up with Dr. Petty 11/8/22 for wound check / suture removal. Anticipate patient will still be hospitalized - will need to notify Dr. Petty of patient's ongoing admission to see if he will remove sutures in hospital  - ID following. PICC placed 10/27/22. Planning for IV Vancomycin / Rocephin through 11/5/22. Follow up with Dr. Gordon in 2 weeks     Iron-deficiency anemia   - Completed 3 days IV iron on 10/20/22  - Hgb stable. Monitor periodically     Non-insulin dependent DMII  - Hgb A1c 7.0%   - Currently on insulin. Levemir 10 units QHS, Lispro 8 units TID AC  - Will work to transition back to PO regimen   - Continue Levemir 10 units QHS. Stop Lispro and start Metformin 500mg BID     Hypertension  - Continue Amlodipine 10mg daily, Lisinopril 40mg PO daily     Neurocognitive disorder  Dementia   - Re-started olanzapine 10/15/22, was recently hospitalized at [facility] for psychiatric issues; apparently PRN Haldol was not much help, have discontinued  - Continue Olanzapine 5mg QAM and 10mg QHS  - Overall stable    Expected Discharge Location and Transportation: rehab with plan to transition to LTC. Private vehicle vs WC van   Expected Discharge Date: 11/11/22    DVT prophylaxis:Medical and mechanical DVT prophylaxis orders are present.     AM-PAC 6 Clicks Score (PT): 19 (11/04/22 0800)    CODE STATUS:   Code Status and Medical Interventions:   Ordered at: 10/14/22 2230     Level Of Support Discussed With:    Patient     Code Status (Patient has no pulse and is not breathing):    CPR (Attempt to Resuscitate)     Medical Interventions (Patient has pulse or is breathing):    Full Support     Elena Ugalde PA-C  11/05/22

## 2022-11-05 NOTE — PLAN OF CARE
Goal Outcome Evaluation:   Patient had been resting comfortably with no acute signs of distress. Around 0445 this morning patient became increasingly agitated with no precipitating factors. Tried to deescalate verbally by reorientation and calming with little success. Contacted security for increased safety. Security talked with patient and was able to successfully calm the patient. Hospitalist contacted and a one time dose of 0.25mg ativan ordered. Held for now. VSS. AO x1. No complaints of pain overnight. Will continue to monitor as patient progresses towards discharge.

## 2022-11-06 LAB
GLUCOSE BLDC GLUCOMTR-MCNC: 152 MG/DL (ref 70–130)
GLUCOSE BLDC GLUCOMTR-MCNC: 182 MG/DL (ref 70–130)
GLUCOSE BLDC GLUCOMTR-MCNC: 184 MG/DL (ref 70–130)

## 2022-11-06 PROCEDURE — 99232 SBSQ HOSP IP/OBS MODERATE 35: CPT | Performed by: PHYSICIAN ASSISTANT

## 2022-11-06 PROCEDURE — 25010000002 VANCOMYCIN PER 500 MG

## 2022-11-06 PROCEDURE — 25010000002 HEPARIN (PORCINE) PER 1000 UNITS: Performed by: ORTHOPAEDIC SURGERY

## 2022-11-06 PROCEDURE — 25010000002 CEFTRIAXONE PER 250 MG: Performed by: INTERNAL MEDICINE

## 2022-11-06 PROCEDURE — 82962 GLUCOSE BLOOD TEST: CPT

## 2022-11-06 RX ADMIN — OLANZAPINE 5 MG: 5 TABLET, FILM COATED ORAL at 08:54

## 2022-11-06 RX ADMIN — LISINOPRIL 40 MG: 40 TABLET ORAL at 08:54

## 2022-11-06 RX ADMIN — Medication 10 ML: at 22:11

## 2022-11-06 RX ADMIN — SENNOSIDES AND DOCUSATE SODIUM 2 TABLET: 50; 8.6 TABLET ORAL at 22:12

## 2022-11-06 RX ADMIN — VANCOMYCIN HYDROCHLORIDE 750 MG: 750 INJECTION, SOLUTION INTRAVENOUS at 05:04

## 2022-11-06 RX ADMIN — Medication 10 ML: at 08:53

## 2022-11-06 RX ADMIN — HEPARIN SODIUM 5000 UNITS: 5000 INJECTION INTRAVENOUS; SUBCUTANEOUS at 05:04

## 2022-11-06 RX ADMIN — METFORMIN HYDROCHLORIDE 1000 MG: 1000 TABLET, FILM COATED ORAL at 17:38

## 2022-11-06 RX ADMIN — SODIUM CHLORIDE 2 G: 900 INJECTION INTRAVENOUS at 11:12

## 2022-11-06 RX ADMIN — FERROUS SULFATE TAB 325 MG (65 MG ELEMENTAL FE) 325 MG: 325 (65 FE) TAB at 08:54

## 2022-11-06 RX ADMIN — VANCOMYCIN HYDROCHLORIDE 750 MG: 750 INJECTION, SOLUTION INTRAVENOUS at 14:15

## 2022-11-06 RX ADMIN — HEPARIN SODIUM 5000 UNITS: 5000 INJECTION INTRAVENOUS; SUBCUTANEOUS at 22:12

## 2022-11-06 RX ADMIN — HEPARIN SODIUM 5000 UNITS: 5000 INJECTION INTRAVENOUS; SUBCUTANEOUS at 14:15

## 2022-11-06 RX ADMIN — OLANZAPINE 10 MG: 5 TABLET, FILM COATED ORAL at 22:12

## 2022-11-06 RX ADMIN — AMLODIPINE BESYLATE 10 MG: 10 TABLET ORAL at 08:54

## 2022-11-06 RX ADMIN — SENNOSIDES AND DOCUSATE SODIUM 2 TABLET: 50; 8.6 TABLET ORAL at 08:54

## 2022-11-06 RX ADMIN — METFORMIN HYDROCHLORIDE 1000 MG: 1000 TABLET, FILM COATED ORAL at 08:54

## 2022-11-06 NOTE — PROGRESS NOTES
[facility] Medicine Services  PROGRESS NOTE    Patient Name: Omkar Nava  : 1955  MRN: [id number]    Date of Admission: 10/14/2022  Primary Care Physician: Provider, No Known    Subjective     CC: L foot osteomyelitis     HPI:  Sitting in bed, family at bedside assisting with breakfast. Says he slept well last night. No pain. No new complaints. We discussed working to get him off of insulin prior to DC - he is interested in this.     ROS:  Gen- No fevers, chills  CV- No chest pain, palpitations  Resp- No cough, dyspnea  GI- No N/V/D, abd pain    Objective     Vital Signs:   Temp:  [97.7 °F (36.5 °C)-98 °F (36.7 °C)] 98 °F (36.7 °C)  Heart Rate:  [66-78] 78  Resp:  [18] 18  BP: (137-156)/(60-72) 137/64     Physical Exam:  Constitutional: No acute distress, awake, alert and conversant. Sitting up in bed   HENT: NCAT, mucous membranes moist  Respiratory: Clear to auscultation bilaterally, respiratory effort normal on room air  Cardiovascular: RRR, no murmurs, rubs, or gallops  Gastrointestinal: Positive bowel sounds, soft, nontender, nondistended  Musculoskeletal: L foot in splint - able to wiggle toes.   Psychiatric: Appropriate affect, cooperative  Neurologic: Moves all extremities spontaneously without focal deficits. Speech clear and coherent     Results Reviewed:  LAB RESULTS:      Lab 22  1111 22  0844 10/31/22  0535   WBC 5.79 6.07 9.55   HEMOGLOBIN 7.9* 7.7* 7.6*   HEMATOCRIT 26.4* 25.4* 24.5*   PLATELETS 388 296 259   NEUTROS ABS  --   --  7.16*   IMMATURE GRANS (ABS)  --   --  0.04   LYMPHS ABS  --   --  1.45   MONOS ABS  --   --  0.72   EOS ABS  --   --  0.15   MCV 91.7 91.7 90.4         Lab 22  0417 22  0844 10/31/22  0535   SODIUM 141 135* 132*   POTASSIUM 4.4 4.1 3.7   CHLORIDE 105 101 98   CO2 28.0 24.0 24.0   ANION GAP 8.0 10.0 10.0   BUN 19 22 19   CREATININE 0.92 1.00 1.07   EGFR 92.3 83.5 77.0   GLUCOSE 105* 174* 159*   CALCIUM 9.2 9.0 8.6          Lab 10/31/22  0535   TOTAL PROTEIN 6.8   ALBUMIN 2.90*   GLOBULIN 3.9   ALT (SGPT) 16   AST (SGOT) 11   BILIRUBIN <0.2   ALK PHOS 106     Brief Urine Lab Results  (Last result in the past 365 days)      Color   Clarity   Blood   Leuk Est   Nitrite   Protein   CREAT   Urine HCG        06/12/22 2056 Yellow   Clear     Negative                   Microbiology Results Abnormal     Procedure Component Value - Date/Time    Fungus Culture - Tissue, Toe, Left [[id number]] Collected: 10/18/22 1608    Lab Status: Preliminary result Specimen: Tissue from Toe, Left Updated: 11/01/22 1815     Fungus Culture No fungus isolated at 2 weeks    AFB Culture - Tissue, Toe, Left [[id number]] Collected: 10/18/22 1608    Lab Status: Preliminary result Specimen: Tissue from Toe, Left Updated: 11/01/22 1815     AFB Culture No AFB isolated at 2 weeks     AFB Stain No acid fast bacilli seen on concentrated smear    Fungus Culture - Tissue, Toe, Left [[id number]] Collected: 10/18/22 1552    Lab Status: Preliminary result Specimen: Tissue from Toe, Left Updated: 11/01/22 1801     Fungus Culture No fungus isolated at 2 weeks    AFB Culture - Tissue, Toe, Left [[id number]] Collected: 10/18/22 1552    Lab Status: Preliminary result Specimen: Tissue from Toe, Left Updated: 11/01/22 1801     AFB Culture No AFB isolated at 2 weeks     AFB Stain No acid fast bacilli seen on concentrated smear    COVID PRE-OP / PRE-PROCEDURE SCREENING ORDER (NO ISOLATION) - Swab, Nasopharynx [[id number]]  (Normal) Collected: 10/27/22 0510    Lab Status: Final result Specimen: Swab from Nasopharynx Updated: 10/27/22 0530    Narrative:      The following orders were created for panel order COVID PRE-OP / PRE-PROCEDURE SCREENING ORDER (NO ISOLATION) - Swab, Nasopharynx.  Procedure                               Abnormality         Status                     ---------                               -----------         ------                     COVID-19, ABBOTT  IN-HOUS...[[id number]]  Normal              Final result                 Please view results for these tests on the individual orders.    COVID-19, ABBOTT IN-HOUSE,NASAL Swab (NO TRANSPORT MEDIA) 2 HR TAT - Swab, Nasopharynx [[id number]]  (Normal) Collected: 10/27/22 0510    Lab Status: Final result Specimen: Swab from Nasopharynx Updated: 10/27/22 0530     COVID19 Presumptive Negative    Narrative:      Fact sheet for providers: https://www.fda.gov/media/672758/download     Fact sheet for patients: https://www.fda.gov/media/878233/download    Test performed by PCR.  If inconsistent with clinical signs and symptoms patient should be tested with different authorized molecular test.    Anaerobic Culture - Tissue, Toe, Left [[id number]]  (Normal) Collected: 10/18/22 1608    Lab Status: Final result Specimen: Tissue from Toe, Left Updated: 10/23/22 0543     Anaerobic Culture No anaerobes isolated at 5 days    Anaerobic Culture - Tissue, Toe, Left [[id number]]  (Normal) Collected: 10/18/22 1552    Lab Status: Final result Specimen: Tissue from Toe, Left Updated: 10/23/22 0543     Anaerobic Culture No anaerobes isolated at 5 days    Tissue / Bone Culture - Tissue, Toe, Left [[id number]] Collected: 10/18/22 1552    Lab Status: Final result Specimen: Tissue from Toe, Left Updated: 10/21/22 0840     Tissue Culture No growth at 3 days     Gram Stain Moderate (3+) WBCs seen      No organisms seen    Blood Culture - Blood, Hand, Left [[id number]]  (Normal) Collected: 10/14/22 2115    Lab Status: Final result Specimen: Blood from Hand, Left Updated: 10/19/22 2215     Blood Culture No growth at 5 days    Blood Culture - Blood, Arm, Left [[id number]]  (Normal) Collected: 10/14/22 2115    Lab Status: Final result Specimen: Blood from Arm, Left Updated: 10/19/22 2215     Blood Culture No growth at 5 days    MRSA Screen, PCR (Inpatient) - Swab, Nares [[id number]]  (Normal) Collected: 10/14/22 5035    Lab Status: Final result Specimen: Swab  from Roberto Updated: 10/15/22 0741     MRSA PCR Negative    Narrative:      The negative predictive value of this diagnostic test is high and should only be used to consider de-escalating anti-MRSA therapy. A positive result may indicate colonization with MRSA and must be correlated clinically.  MRSA Negative        No radiology results from the last 24 hrs    I have reviewed the medications:  Scheduled Meds:amLODIPine, 10 mg, Oral, Q24H  cefTRIAXone, 2 g, Intravenous, Q24H  ferrous sulfate, 325 mg, Oral, Daily With Breakfast  heparin (porcine), 5,000 Units, Subcutaneous, Q8H  insulin detemir, 10 Units, Subcutaneous, Nightly  lisinopril, 40 mg, Oral, Daily  metFORMIN, 500 mg, Oral, BID With Meals  OLANZapine, 5 mg, Oral, Daily   And  OLANZapine, 10 mg, Oral, Nightly  senna-docusate sodium, 2 tablet, Oral, BID  sodium chloride, 10 mL, Intravenous, Q12H  vancomycin, 750 mg, Intravenous, Q12H      Continuous Infusions:   PRN Meds:.•  acetaminophen **OR** acetaminophen  •  bisacodyl  •  dextrose  •  dextrose  •  glucagon (human recombinant)  •  magnesium sulfate **OR** magnesium sulfate in D5W 1g/100mL (PREMIX) **OR** magnesium sulfate  •  melatonin  •  [] HYDROmorphone **AND** naloxone  •  polyethylene glycol  •  Insert peripheral IV **AND** sodium chloride    Assessment & Plan     Active Hospital Problems    Diagnosis  POA   • **Acute osteomyelitis of left foot (HCC) [M86.172]  Yes   • Neurocognitive disorder [R41.9]  Unknown   • Type 2 diabetes mellitus (HCC) [E11.9]  Yes   • Essential hypertension [I10]  Yes   • Psychotic disorder (HCC) [F29]  Yes      Resolved Hospital Problems   No resolved problems to display.     Brief Hospital Course to date:  Omkar Nava is a 67 y.o. male with PMH significant for HTN, DMII and unspecified psychotic disorder. He previously worked as a  but has been unable to work for past 2-3 years and has become homeless.     His health declined in 2022 with an acute  psychotic event with subsequent psychiatric hospitalization at [facility] in [facility] from June to August 2022. He was subsequently transferred to a nursing facility in [place]. On 9/17/22, daughter visited him at the nursing home and he had a significant injury to his L great toe, causing an open articular fracture. He was admitted to [facility] and treated at that time with bedside flushing and repair by podiatry. He was given Ancef and sent back to nursing facility on Keflex. Wound cultures at that time grew MRSA, however blood culture showed no growth. On 9/27/2022, he presented to [facility] in [place] due to worsening foot wound and drainage. He underwent L foot I&D and debridement on 9/30/22. A PICC was placed on 10/3/22. He was to receive Daptomycin but due to expense, this was changed to Rifampin and Linezolid and PICC was removed. He was discharged to SNF in [facility] on 10/6/22 and at some point, was transferred to [place], in order to be closer to family. Daughter visited him on 10/14/22 and became concerned about the appearance of his toe. She brought him to [facility] ED for further evaluation. ID and orthopedic surgery follow.     Osteomyelitis of left foot / L first toe (s/p amputation)  Wound culture (+) Raoultella ornithinolytica   - 10/15/22 MRI of L foot showed wound around the great toe with geographic marrow signal alteration within the distal aspect of the first metatarsal as well as both phalanges of the great toe indicative of osteomyelitis  - He is s/p L great toe amputation on 10/18/22 by Dr. Petty  - NWB to LLE. Elevated LLE. Keep splint clean and dry. SQ heparin for DVT PPX while inpatient. Discharge on ASA 325mg daily x 30 days  - Dressing changed 11/11/22. Has outpatient follow up with Dr. Petty 11/8/22 for wound check / suture removal. Anticipate patient will still be hospitalized - will notify Dr. Petty of patient's  ongoing admission tomorrow to see if he will remove sutures in hospital  - ID following. PICC placed 10/27/22. Planning for IV Vancomycin / Rocephin through 11/25/22. Follow up with Dr. Gordon in 2 weeks.     Iron-deficiency anemia   - Completed 3 days IV iron on 10/20/22  - Hgb stable. Monitor periodically     Non-insulin dependent DMII  - Hgb A1c 7.0%   - Stop Levemir. Increase Metformin to 1000mg BID  - Continue TID accuchecks     Hypertension  - Continue Amlodipine 10mg daily, Lisinopril 40mg PO daily     Neurocognitive disorder  Dementia   - Re-started olanzapine 10/15/22, was recently hospitalized at [facility] for psychiatric issues; apparently PRN Haldol was not much help, have discontinued  - Continue Olanzapine 5mg QAM and 10mg QHS  - Overall stable    Expected Discharge Location and Transportation: rehab with plan to transition to LTC. Private vehicle vs WC van   Expected Discharge Date: 11/11/22    DVT prophylaxis:Medical and mechanical DVT prophylaxis orders are present.     AM-PAC 6 Clicks Score (PT): 19 (11/04/22 0800)    CODE STATUS:   Code Status and Medical Interventions:   Ordered at: 10/14/22 2230     Level Of Support Discussed With:    Patient     Code Status (Patient has no pulse and is not breathing):    CPR (Attempt to Resuscitate)     Medical Interventions (Patient has pulse or is breathing):    Full Support     Elena Ugalde PA-C  11/06/22

## 2022-11-06 NOTE — PLAN OF CARE
Goal Outcome Evaluation:         Uneventful day; VSS, good appetite. Awaiting placement.  Problem: Adult Inpatient Plan of Care  Goal: Patient-Specific Goal (Individualized)  Outcome: Ongoing, Progressing  Goal: Readiness for Transition of Care  Outcome: Ongoing, Progressing     Problem: BADL (Basic Activities of Daily Living) Impairment (Lower Extremity Amputation)  Goal: Optimal Safe BADL Performance  Outcome: Ongoing, Progressing  Goal: Optimal Safe BADL Performance  Outcome: Ongoing, Progressing     Problem: IADL (Instrumental Activities of Daily Living) Impairment (Lower Extremity Amputation)  Goal: Optimal Safe IADL Performance  Outcome: Ongoing, Progressing  Goal: Optimal Safe IADL Performance  Outcome: Ongoing, Progressing     Problem: Lower Limb Care (Lower Extremity Amputation)  Goal: Effective Lower Limb Care  Outcome: Ongoing, Progressing  Goal: Effective Lower Limb Care  Outcome: Ongoing, Progressing     Problem: Mobility Impairment (Lower Extremity Amputation)  Goal: Optimal Mobility [place] and Safety  Outcome: Ongoing, Progressing  Goal: Optimal Mobility [place] and Safety  Outcome: Ongoing, Progressing     Problem: Pain and Hypersensitivity (Lower Extremity Amputation)  Goal: Acceptable Pain/Hypersensitivity Control  Outcome: Ongoing, Progressing  Goal: Acceptable Pain/Hypersensitivity Control  Outcome: Ongoing, Progressing     Problem: Adjustment to Surgery (Extremity Amputation)  Goal: Optimal Coping with Amputation  Outcome: Ongoing, Progressing  Intervention: Support Psychsocial Response  Recent Flowsheet Documentation  Taken 11/6/2022 0800 by Melanie Jones, RN  Supportive Measures: active listening utilized  Goal: Optimal Coping with Amputation  Outcome: Ongoing, Progressing  Intervention: Support Psychsocial Response  Recent Flowsheet Documentation  Taken 11/6/2022 0800 by Melanie Jones, RN  Supportive Measures: active listening utilized     Problem: Bleeding (Extremity  Amputation)  Goal: Absence of Bleeding  Outcome: Ongoing, Progressing  Intervention: Monitor and Manage Bleeding  Recent Flowsheet Documentation  Taken 11/6/2022 0800 by Melanie Jones RN  Bleeding Management: dressing monitored  Goal: Absence of Bleeding  Outcome: Ongoing, Progressing  Intervention: Monitor and Manage Bleeding  Recent Flowsheet Documentation  Taken 11/6/2022 0800 by Melanie Jones RN  Bleeding Management: dressing monitored     Problem: Bowel Motility Impaired (Extremity Amputation)  Goal: Effective Bowel Elimination  Outcome: Ongoing, Progressing  Goal: Effective Bowel Elimination  Outcome: Ongoing, Progressing     Problem: Fluid and Electrolyte Imbalance (Extremity Amputation)  Goal: Fluid and Electrolyte Balance  Outcome: Ongoing, Progressing  Goal: Fluid and Electrolyte Balance  Outcome: Ongoing, Progressing     Problem: Functional Ability Impaired (Extremity Amputation)  Goal: Optimal Functional Ability  Outcome: Ongoing, Progressing  Intervention: Optimize Functional Ability  Recent Flowsheet Documentation  Taken 11/6/2022 1600 by Melanie Jones RN  Activity Management: activity adjusted per tolerance  Activity Assistance Provided: assistance, 1 person  Taken 11/6/2022 1415 by Melanie Jones RN  Activity Management: activity adjusted per tolerance  Activity Assistance Provided: assistance, 1 person  Taken 11/6/2022 1200 by Melanie Jones RN  Activity Management: activity adjusted per tolerance  Activity Assistance Provided: assistance, 1 person  Taken 11/6/2022 1000 by Melanie Jones RN  Activity Management: activity adjusted per tolerance  Activity Assistance Provided: assistance, 1 person  Taken 11/6/2022 0800 by Melanie Jones RN  Activity Management: activity adjusted per tolerance  Activity Assistance Provided: assistance, 1 person  Self-Care Promotion: independence encouraged  Goal: Optimal Functional Ability  Outcome: Ongoing,  Progressing  Intervention: Optimize Functional Ability  Recent Flowsheet Documentation  Taken 11/6/2022 1600 by Melanie Jones RN  Activity Management: activity adjusted per tolerance  Activity Assistance Provided: assistance, 1 person  Taken 11/6/2022 1415 by Melanie Jones RN  Activity Management: activity adjusted per tolerance  Activity Assistance Provided: assistance, 1 person  Taken 11/6/2022 1200 by Melanie Jones RN  Activity Management: activity adjusted per tolerance  Activity Assistance Provided: assistance, 1 person  Taken 11/6/2022 1000 by Melanie Jones RN  Activity Management: activity adjusted per tolerance  Activity Assistance Provided: assistance, 1 person  Taken 11/6/2022 0800 by Melanie Jones RN  Activity Management: activity adjusted per tolerance  Activity Assistance Provided: assistance, 1 person  Self-Care Promotion: independence encouraged     Problem: Infection (Extremity Amputation)  Goal: Absence of Infection Signs and Symptoms  Outcome: Ongoing, Progressing  Intervention: Prevent or Manage Infection  Recent Flowsheet Documentation  Taken 11/6/2022 1600 by Melanie Jones RN  Infection Prevention:   hand hygiene promoted   rest/sleep promoted  Taken 11/6/2022 1200 by Melanie Jones RN  Infection Prevention:   hand hygiene promoted   personal protective equipment utilized   rest/sleep promoted  Taken 11/6/2022 1000 by Melanie Jones RN  Infection Prevention:   hand hygiene promoted   rest/sleep promoted  Taken 11/6/2022 0800 by Melanie Jones RN  Infection Prevention:   hand hygiene promoted   rest/sleep promoted   single patient room provided  Goal: Absence of Infection Signs and Symptoms  Outcome: Ongoing, Progressing  Intervention: Prevent or Manage Infection  Recent Flowsheet Documentation  Taken 11/6/2022 1600 by Melanie Jones RN  Infection Prevention:   hand hygiene promoted   rest/sleep promoted  Taken 11/6/2022 1200 by Karen  COREEN Navarro  Infection Prevention:   hand hygiene promoted   personal protective equipment utilized   rest/sleep promoted  Taken 11/6/2022 1000 by Melanie Jones RN  Infection Prevention:   hand hygiene promoted   rest/sleep promoted  Taken 11/6/2022 0800 by Melanie Jones RN  Infection Prevention:   hand hygiene promoted   rest/sleep promoted   single patient room provided     Problem: Ongoing Anesthesia Effects (Extremity Amputation)  Goal: Anesthesia/Sedation Recovery  Outcome: Ongoing, Progressing  Intervention: Optimize Anesthesia Recovery  Recent Flowsheet Documentation  Taken 11/6/2022 1600 by Melanie Jones RN  Safety Promotion/Fall Prevention:   activity supervised   assistive device/personal items within reach   clutter free environment maintained   fall prevention program maintained   nonskid shoes/slippers when out of bed   safety round/check completed  Taken 11/6/2022 1415 by Melanie Jones RN  Safety Promotion/Fall Prevention:   assistive device/personal items within reach   clutter free environment maintained   fall prevention program maintained   nonskid shoes/slippers when out of bed   safety round/check completed  Taken 11/6/2022 1200 by Melanie Jones RN  Safety Promotion/Fall Prevention:   safety round/check completed   nonskid shoes/slippers when out of bed   fall prevention program maintained   clutter free environment maintained   assistive device/personal items within reach  Taken 11/6/2022 1000 by Melanie Jones RN  Safety Promotion/Fall Prevention:   assistive device/personal items within reach   activity supervised   fall prevention program maintained   nonskid shoes/slippers when out of bed   safety round/check completed  Administration (IS): refused  Taken 11/6/2022 0800 by Melanie Jones RN  Safety Promotion/Fall Prevention:   safety round/check completed   nonskid shoes/slippers when out of bed   fall prevention program maintained   assistive  device/personal items within reach   activity supervised  Administration (IS):   instruction provided, follow-up   refused  Goal: Anesthesia/Sedation Recovery  Outcome: Ongoing, Progressing  Intervention: Optimize Anesthesia Recovery  Recent Flowsheet Documentation  Taken 11/6/2022 1600 by Melanie Jones RN  Safety Promotion/Fall Prevention:   activity supervised   assistive device/personal items within reach   clutter free environment maintained   fall prevention program maintained   nonskid shoes/slippers when out of bed   safety round/check completed  Taken 11/6/2022 1415 by Melanie Jones RN  Safety Promotion/Fall Prevention:   assistive device/personal items within reach   clutter free environment maintained   fall prevention program maintained   nonskid shoes/slippers when out of bed   safety round/check completed  Taken 11/6/2022 1200 by Melanie Jones RN  Safety Promotion/Fall Prevention:   safety round/check completed   nonskid shoes/slippers when out of bed   fall prevention program maintained   clutter free environment maintained   assistive device/personal items within reach  Taken 11/6/2022 1000 by Melanie Jones RN  Safety Promotion/Fall Prevention:   assistive device/personal items within reach   activity supervised   fall prevention program maintained   nonskid shoes/slippers when out of bed   safety round/check completed  Administration (IS): refused  Taken 11/6/2022 0800 by Melanie Jones RN  Safety Promotion/Fall Prevention:   safety round/check completed   nonskid shoes/slippers when out of bed   fall prevention program maintained   assistive device/personal items within reach   activity supervised  Administration (IS):   instruction provided, follow-up   refused     Problem: Pain (Extremity Amputation)  Goal: Acceptable Pain Control  Outcome: Ongoing, Progressing  Intervention: Prevent or Manage Pain  Recent Flowsheet Documentation  Taken 11/6/2022 0800 by Karen  COREEN Navarro  Diversional Activities: television  Goal: Acceptable Pain Control  Outcome: Ongoing, Progressing  Intervention: Prevent or Manage Pain  Recent Flowsheet Documentation  Taken 11/6/2022 0800 by Melanie Jones RN  Diversional Activities: television     Problem: Postoperative Urinary Retention (Extremity Amputation)  Goal: Effective Urinary Elimination  Outcome: Ongoing, Progressing  Goal: Effective Urinary Elimination  Outcome: Ongoing, Progressing     Problem: Residual Limb Care (Extremity Amputation)  Goal: Optimal Residual Limb Healing  Outcome: Ongoing, Progressing  Goal: Optimal Residual Limb Healing  Outcome: Ongoing, Progressing     Problem: Skin Injury Risk Increased  Goal: Skin Health and Integrity  Outcome: Ongoing, Progressing  Intervention: Optimize Skin Protection  Recent Flowsheet Documentation  Taken 11/6/2022 1600 by Melanie Jones RN  Pressure Reduction Techniques:   frequent weight shift encouraged   pressure points protected  Head of Bed (HOB) Positioning:   HOB elevated   HOB at 30-45 degrees  Pressure Reduction Devices:   pressure-redistributing mattress utilized   positioning supports utilized  Skin Protection:   adhesive use limited   incontinence pads utilized   transparent dressing maintained   tubing/devices free from skin contact   skin sealant/moisture barrier applied  Taken 11/6/2022 1415 by Melanie Jones RN  Pressure Reduction Techniques:   frequent weight shift encouraged   pressure points protected  Head of Bed (HOB) Positioning:   HOB elevated   HOB at 30-45 degrees  Pressure Reduction Devices:   pressure-redistributing mattress utilized   positioning supports utilized  Skin Protection:   adhesive use limited   incontinence pads utilized   transparent dressing maintained   tubing/devices free from skin contact  Taken 11/6/2022 1200 by Melanie Jones RN  Pressure Reduction Techniques:   frequent weight shift encouraged   rest period provided between sit  times  Head of Bed (HOB) Positioning:   HOB elevated   HOB at 30-45 degrees  Pressure Reduction Devices:   pressure-redistributing mattress utilized   positioning supports utilized  Skin Protection:   adhesive use limited   incontinence pads utilized   tubing/devices free from skin contact   transparent dressing maintained  Taken 11/6/2022 1000 by Melanie Jones RN  Head of Bed (HOB) Positioning:   HOB elevated   HOB at 30-45 degrees  Taken 11/6/2022 0800 by Melanie Jones RN  Pressure Reduction Techniques:   frequent weight shift encouraged   pressure points protected  Head of Bed (HOB) Positioning:   HOB elevated   HOB at 30-45 degrees  Pressure Reduction Devices:   pressure-redistributing mattress utilized   positioning supports utilized  Skin Protection:   adhesive use limited   incontinence pads utilized   transparent dressing maintained   tubing/devices free from skin contact  Goal: Skin Health and Integrity  Outcome: Ongoing, Progressing  Intervention: Optimize Skin Protection  Recent Flowsheet Documentation  Taken 11/6/2022 1600 by Melanie Jones RN  Pressure Reduction Techniques:   frequent weight shift encouraged   pressure points protected  Head of Bed (HOB) Positioning:   HOB elevated   HOB at 30-45 degrees  Pressure Reduction Devices:   pressure-redistributing mattress utilized   positioning supports utilized  Skin Protection:   adhesive use limited   incontinence pads utilized   transparent dressing maintained   tubing/devices free from skin contact   skin sealant/moisture barrier applied  Taken 11/6/2022 1415 by Melanie Jones RN  Pressure Reduction Techniques:   frequent weight shift encouraged   pressure points protected  Head of Bed (HOB) Positioning:   HOB elevated   HOB at 30-45 degrees  Pressure Reduction Devices:   pressure-redistributing mattress utilized   positioning supports utilized  Skin Protection:   adhesive use limited   incontinence pads utilized   transparent  dressing maintained   tubing/devices free from skin contact  Taken 11/6/2022 1200 by Melanie Jones RN  Pressure Reduction Techniques:   frequent weight shift encouraged   rest period provided between sit times  Head of Bed (HOB) Positioning:   HOB elevated   HOB at 30-45 degrees  Pressure Reduction Devices:   pressure-redistributing mattress utilized   positioning supports utilized  Skin Protection:   adhesive use limited   incontinence pads utilized   tubing/devices free from skin contact   transparent dressing maintained  Taken 11/6/2022 1000 by Melanie Jones RN  Head of Bed (HOB) Positioning:   HOB elevated   HOB at 30-45 degrees  Taken 11/6/2022 0800 by Melanie Jones RN  Pressure Reduction Techniques:   frequent weight shift encouraged   pressure points protected  Head of Bed (HOB) Positioning:   HOB elevated   HOB at 30-45 degrees  Pressure Reduction Devices:   pressure-redistributing mattress utilized   positioning supports utilized  Skin Protection:   adhesive use limited   incontinence pads utilized   transparent dressing maintained   tubing/devices free from skin contact     Problem: Fall Injury Risk  Goal: Absence of Fall and Fall-Related Injury  Outcome: Ongoing, Progressing  Intervention: Identify and Manage Contributors  Flowsheets (Taken 11/6/2022 0800)  Medication Review/Management:   medications reviewed   high-risk medications identified  Self-Care Promotion: independence encouraged  Intervention: Promote Injury-Free Environment  Recent Flowsheet Documentation  Taken 11/6/2022 1600 by Melanie Jones RN  Safety Promotion/Fall Prevention:   activity supervised   assistive device/personal items within reach   clutter free environment maintained   fall prevention program maintained   nonskid shoes/slippers when out of bed   safety round/check completed  Taken 11/6/2022 1415 by Melanie Jones RN  Safety Promotion/Fall Prevention:   assistive device/personal items within  reach   clutter free environment maintained   fall prevention program maintained   nonskid shoes/slippers when out of bed   safety round/check completed  Taken 11/6/2022 1200 by Melanie Jones RN  Safety Promotion/Fall Prevention:   safety round/check completed   nonskid shoes/slippers when out of bed   fall prevention program maintained   clutter free environment maintained   assistive device/personal items within reach  Taken 11/6/2022 1000 by Melanie Jones RN  Safety Promotion/Fall Prevention:   assistive device/personal items within reach   activity supervised   fall prevention program maintained   nonskid shoes/slippers when out of bed   safety round/check completed  Taken 11/6/2022 0800 by Melanie Jones RN  Safety Promotion/Fall Prevention:   safety round/check completed   nonskid shoes/slippers when out of bed   fall prevention program maintained   assistive device/personal items within reach   activity supervised  Goal: Absence of Fall and Fall-Related Injury  Outcome: Ongoing, Progressing  Intervention: Identify and Manage Contributors  Recent Flowsheet Documentation  Taken 11/6/2022 0800 by Melanie Jones RN  Medication Review/Management:   medications reviewed   high-risk medications identified  Self-Care Promotion: independence encouraged  Intervention: Promote Injury-Free Environment  Recent Flowsheet Documentation  Taken 11/6/2022 1600 by Melanie Jones RN  Safety Promotion/Fall Prevention:   activity supervised   assistive device/personal items within reach   clutter free environment maintained   fall prevention program maintained   nonskid shoes/slippers when out of bed   safety round/check completed  Taken 11/6/2022 1415 by Melanie Jones RN  Safety Promotion/Fall Prevention:   assistive device/personal items within reach   clutter free environment maintained   fall prevention program maintained   nonskid shoes/slippers when out of bed   safety round/check  completed  Taken 11/6/2022 1200 by Melanie Jones RN  Safety Promotion/Fall Prevention:   safety round/check completed   nonskid shoes/slippers when out of bed   fall prevention program maintained   clutter free environment maintained   assistive device/personal items within reach  Taken 11/6/2022 1000 by Melanie Jones RN  Safety Promotion/Fall Prevention:   assistive device/personal items within reach   activity supervised   fall prevention program maintained   nonskid shoes/slippers when out of bed   safety round/check completed  Taken 11/6/2022 0800 by Melanie Jones RN  Safety Promotion/Fall Prevention:   safety round/check completed   nonskid shoes/slippers when out of bed   fall prevention program maintained   assistive device/personal items within reach   activity supervised     Problem: Postoperative Nausea and Vomiting (Extremity Amputation)  Goal: Nausea and Vomiting Relief  Outcome: Ongoing, Progressing     Problem: Pain Acute  Goal: Acceptable Pain Control and Functional Ability  Outcome: Ongoing, Progressing  Intervention: Prevent or Manage Pain  Recent Flowsheet Documentation  Taken 11/6/2022 0800 by Melanie Jones RN  Bowel Elimination Promotion:   diet adjusted   adequate fluid intake promoted  Sleep/Rest Enhancement: awakenings minimized  Medication Review/Management:   medications reviewed   high-risk medications identified  Intervention: Optimize Psychosocial Wellbeing  Recent Flowsheet Documentation  Taken 11/6/2022 0800 by Melanie Jones RN  Supportive Measures: active listening utilized  Diversional Activities: television     Problem: Adjustment to Amputation (Lower Extremity Amputation)  Goal: Optimal Adjustment to Amputation  Outcome: Ongoing, Progressing  Intervention: Promote Patient and Family Adjustment to Amputation  Recent Flowsheet Documentation  Taken 11/6/2022 0800 by Melanie Jones RN  Supportive Measures: active listening utilized     Problem:  Prosthetic Use and Management (Lower Extremity Amputation)  Goal: Effective Prosthesis Use and Management  Outcome: Ongoing, Progressing

## 2022-11-06 NOTE — PLAN OF CARE
Goal Outcome Evaluation:                 Problem: Fall Injury Risk  Goal: Absence of Fall and Fall-Related Injury  Intervention: Identify and Manage Contributors  Recent Flowsheet Documentation  Taken 11/6/2022 0600 by Melvin Suárez RN  Medication Review/Management: medications reviewed  Taken 11/6/2022 0400 by Melvin Suárez RN  Medication Review/Management: medications reviewed  Taken 11/6/2022 0200 by Melvin Suárez RN  Medication Review/Management: medications reviewed  Taken 11/6/2022 0000 by Melvin Suárez RN  Medication Review/Management: medications reviewed  Taken 11/5/2022 2200 by Melvin Suárez RN  Medication Review/Management: medications reviewed  Taken 11/5/2022 2000 by Melvin Suárez RN  Medication Review/Management: medications reviewed  Intervention: Promote Injury-Free Environment  Recent Flowsheet Documentation  Taken 11/6/2022 0600 by Melvin Suárez RN  Safety Promotion/Fall Prevention:   activity supervised   safety round/check completed   toileting scheduled  Taken 11/6/2022 0400 by Melvin Suárez RN  Safety Promotion/Fall Prevention:   activity supervised   safety round/check completed   toileting scheduled  Taken 11/6/2022 0200 by Melvin Suárez RN  Safety Promotion/Fall Prevention:   activity supervised   safety round/check completed   toileting scheduled  Taken 11/6/2022 0000 by Melvin Suárez RN  Safety Promotion/Fall Prevention:   activity supervised   safety round/check completed   toileting scheduled  Taken 11/5/2022 2200 by Melvin Suárez RN  Safety Promotion/Fall Prevention:   activity supervised   safety round/check completed   toileting scheduled  Taken 11/5/2022 2000 by Melvin Suárez RN  Safety Promotion/Fall Prevention:   activity supervised   safety round/check completed   toileting scheduled     Problem: Skin Injury Risk Increased  Goal: Skin Health and Integrity  Intervention: Optimize Skin Protection  Recent Flowsheet Documentation  Taken 11/6/2022  0600 by Melvin Suárez RN  Pressure Reduction Techniques: frequent weight shift encouraged  Head of Bed (HOB) Positioning: HOB at 30-45 degrees  Pressure Reduction Devices: pressure-redistributing mattress utilized  Skin Protection: adhesive use limited  Taken 11/6/2022 0400 by Melvin Suárez RN  Pressure Reduction Techniques: frequent weight shift encouraged  Head of Bed (HOB) Positioning: HOB at 20-30 degrees  Pressure Reduction Devices: pressure-redistributing mattress utilized  Skin Protection: adhesive use limited  Taken 11/6/2022 0200 by Melvin Suárez RN  Pressure Reduction Techniques: frequent weight shift encouraged  Head of Bed (HOB) Positioning: HOB at 20-30 degrees  Pressure Reduction Devices: pressure-redistributing mattress utilized  Skin Protection: adhesive use limited  Taken 11/6/2022 0000 by Melvin Suárez RN  Pressure Reduction Techniques: frequent weight shift encouraged  Head of Bed (HOB) Positioning: HOB at 20-30 degrees  Pressure Reduction Devices: pressure-redistributing mattress utilized  Skin Protection: adhesive use limited  Taken 11/5/2022 2200 by Melvin Suárez RN  Pressure Reduction Techniques: frequent weight shift encouraged  Head of Bed (HOB) Positioning: HOB at 20-30 degrees  Pressure Reduction Devices: pressure-redistributing mattress utilized  Skin Protection: adhesive use limited  Taken 11/5/2022 2000 by Melvin Suárez RN  Pressure Reduction Techniques: frequent weight shift encouraged  Head of Bed (HOB) Positioning: HOB at 20-30 degrees  Pressure Reduction Devices: pressure-redistributing mattress utilized  Skin Protection: adhesive use limited     Problem: Adult Inpatient Plan of Care  Goal: Absence of Hospital-Acquired Illness or Injury  Intervention: Identify and Manage Fall Risk  Recent Flowsheet Documentation  Taken 11/6/2022 0600 by Melvin Suárez RN  Safety Promotion/Fall Prevention:   activity supervised   safety round/check completed   toileting  scheduled  Taken 11/6/2022 0400 by Melvin Suárez RN  Safety Promotion/Fall Prevention:   activity supervised   safety round/check completed   toileting scheduled  Taken 11/6/2022 0200 by Melvin Suárez RN  Safety Promotion/Fall Prevention:   activity supervised   safety round/check completed   toileting scheduled  Taken 11/6/2022 0000 by Melvin Suárez RN  Safety Promotion/Fall Prevention:   activity supervised   safety round/check completed   toileting scheduled  Taken 11/5/2022 2200 by Melvin Suárez RN  Safety Promotion/Fall Prevention:   activity supervised   safety round/check completed   toileting scheduled  Taken 11/5/2022 2000 by Melvin Suárez RN  Safety Promotion/Fall Prevention:   activity supervised   safety round/check completed   toileting scheduled  Intervention: Prevent Skin Injury  Recent Flowsheet Documentation  Taken 11/6/2022 0600 by Melvin Suárez RN  Body Position: supine  Skin Protection: adhesive use limited  Taken 11/6/2022 0400 by Melvin Suárez RN  Body Position: supine  Skin Protection: adhesive use limited  Taken 11/6/2022 0200 by Melvin Suárez RN  Body Position:   right   side-lying  Skin Protection: adhesive use limited  Taken 11/6/2022 0000 by Melvin Suárez RN  Body Position: supine  Skin Protection: adhesive use limited  Taken 11/5/2022 2200 by Melvin Suárez RN  Body Position: supine  Skin Protection: adhesive use limited  Taken 11/5/2022 2000 by Melvin Suárez RN  Body Position: supine  Skin Protection: adhesive use limited  Intervention: Prevent and Manage VTE (Venous Thromboembolism) Risk  Recent Flowsheet Documentation  Taken 11/6/2022 0600 by Melvin Suárez RN  VTE Prevention/Management: patient refused intervention  Taken 11/6/2022 0400 by Melvin Suárez RN  VTE Prevention/Management: patient refused intervention  Taken 11/6/2022 0200 by Melvin Suárez RN  VTE Prevention/Management: patient refused intervention  Taken 11/6/2022 0000 by Jose Armando  Melvin GIRON RN  VTE Prevention/Management: patient refused intervention  Taken 11/5/2022 2200 by Melvin Suárez RN  VTE Prevention/Management: patient refused intervention  Taken 11/5/2022 2000 by Melvin Suárez RN  VTE Prevention/Management: patient refused intervention  Intervention: Prevent Infection  Recent Flowsheet Documentation  Taken 11/6/2022 0600 by Melvin Suárez RN  Infection Prevention: environmental surveillance performed  Taken 11/6/2022 0400 by Melvin Suárez RN  Infection Prevention: environmental surveillance performed  Taken 11/6/2022 0200 by Melvin Suárez RN  Infection Prevention: environmental surveillance performed  Taken 11/6/2022 0000 by Melvin Suárez RN  Infection Prevention: environmental surveillance performed  Taken 11/5/2022 2200 by Melvin Suárez RN  Infection Prevention: environmental surveillance performed  Taken 11/5/2022 2000 by Melvin Suárez RN  Infection Prevention: environmental surveillance performed  Goal: Optimal Comfort and Wellbeing  Intervention: Monitor Pain and Promote Comfort  Recent Flowsheet Documentation  Taken 11/6/2022 0600 by Melvin Suárez RN  Pain Management Interventions: quiet environment facilitated  Taken 11/6/2022 0400 by Melvin Suárez RN  Pain Management Interventions: quiet environment facilitated  Taken 11/6/2022 0200 by Melvin Suárez RN  Pain Management Interventions: quiet environment facilitated  Taken 11/6/2022 0000 by Melvin Suárez RN  Pain Management Interventions: quiet environment facilitated  Taken 11/5/2022 2200 by Melvin Suárez RN  Pain Management Interventions: quiet environment facilitated  Taken 11/5/2022 2000 by Melvin Suárez RN  Pain Management Interventions: quiet environment facilitated  Intervention: Provide Person-Centered Care  Recent Flowsheet Documentation  Taken 11/6/2022 0600 by Melvin Suárez RN  Trust Relationship/Rapport: care explained  Taken 11/6/2022 0400 by Melvin Suárez RN  Trust  Relationship/Rapport: care explained  Taken 11/6/2022 0200 by Melvin Suárez RN  Trust Relationship/Rapport: care explained  Taken 11/6/2022 0000 by Melvin Suárez RN  Trust Relationship/Rapport: care explained  Taken 11/5/2022 2200 by Melvin Suárez RN  Trust Relationship/Rapport: care explained  Taken 11/5/2022 2000 by Melvin Suárez RN  Trust Relationship/Rapport: care explained     Problem: Restraint, Nonbehavioral (Nonviolent)  Goal: Absence of Harm or Injury  Intervention: Protect Dignity, Rights, and Personal Wellbeing  Recent Flowsheet Documentation  Taken 11/6/2022 0600 by Melvin Suárez RN  Trust Relationship/Rapport: care explained  Taken 11/6/2022 0400 by Melvin Suárez RN  Trust Relationship/Rapport: care explained  Taken 11/6/2022 0200 by Melvin Suárez RN  Trust Relationship/Rapport: care explained  Taken 11/6/2022 0000 by Melvin Suárez RN  Trust Relationship/Rapport: care explained  Taken 11/5/2022 2200 by Melvin Suárez RN  Trust Relationship/Rapport: care explained  Taken 11/5/2022 2000 by Melvin Suárez RN  Trust Relationship/Rapport: care explained  Intervention: Protect Skin and Joint Integrity  Recent Flowsheet Documentation  Taken 11/6/2022 0600 by Melvin Suárez RN  Body Position: supine  Taken 11/6/2022 0400 by Melvin Suárez RN  Body Position: supine  Taken 11/6/2022 0200 by Melvin Suárez RN  Body Position:   right   side-lying  Taken 11/6/2022 0000 by Melvin Suárez RN  Body Position: supine  Taken 11/5/2022 2200 by Melvin Suárez RN  Body Position: supine  Taken 11/5/2022 2000 by Melvin Suárez RN  Body Position: supine     Problem: Pain Acute  Goal: Acceptable Pain Control and Functional Ability  Intervention: Prevent or Manage Pain  Recent Flowsheet Documentation  Taken 11/6/2022 0600 by Melvin Suárez RN  Medication Review/Management: medications reviewed  Taken 11/6/2022 0400 by Melvin Suárez, RN  Medication Review/Management: medications  reviewed  Taken 11/6/2022 0200 by Melvin Suárez RN  Medication Review/Management: medications reviewed  Taken 11/6/2022 0000 by Melvin Suárez RN  Medication Review/Management: medications reviewed  Taken 11/5/2022 2200 by Melvin Suárez RN  Medication Review/Management: medications reviewed  Taken 11/5/2022 2000 by Melvin Suárez RN  Sensory Stimulation Regulation: care clustered  Medication Review/Management: medications reviewed  Intervention: Develop Pain Management Plan  Recent Flowsheet Documentation  Taken 11/6/2022 0600 by Melvin Suárez RN  Pain Management Interventions: quiet environment facilitated  Taken 11/6/2022 0400 by Melvin Suárez RN  Pain Management Interventions: quiet environment facilitated  Taken 11/6/2022 0200 by Melvin Suárez RN  Pain Management Interventions: quiet environment facilitated  Taken 11/6/2022 0000 by Melvin Suárez RN  Pain Management Interventions: quiet environment facilitated  Taken 11/5/2022 2200 by Melvin Suárez RN  Pain Management Interventions: quiet environment facilitated  Taken 11/5/2022 2000 by Melvin Suárez RN  Pain Management Interventions: quiet environment facilitated  Intervention: Optimize Psychosocial Wellbeing  Recent Flowsheet Documentation  Taken 11/6/2022 0200 by Melvin Suárez RN  Supportive Measures: active listening utilized  Taken 11/5/2022 2200 by Melvin Suárez RN  Supportive Measures: active listening utilized     Problem: Adjustment to Amputation (Lower Extremity Amputation)  Goal: Optimal Adjustment to Amputation  Intervention: Promote Patient and Family Adjustment to Amputation  Recent Flowsheet Documentation  Taken 11/6/2022 0200 by Melvin Suárez RN  Supportive Measures: active listening utilized  Taken 11/5/2022 2200 by Melvin Suárez RN  Supportive Measures: active listening utilized     Problem: Adjustment to Surgery (Extremity Amputation)  Goal: Optimal Coping with Amputation  Intervention: Support  Psychsocial Response  Recent Flowsheet Documentation  Taken 11/6/2022 0200 by Melvin Suárez RN  Supportive Measures: active listening utilized  Taken 11/5/2022 2200 by Melvin Suárez RN  Supportive Measures: active listening utilized  Goal: Optimal Coping with Amputation  Intervention: Support Psychsocial Response  Recent Flowsheet Documentation  Taken 11/6/2022 0200 by Melvin Suárez RN  Supportive Measures: active listening utilized  Taken 11/5/2022 2200 by Melvin Suárez RN  Supportive Measures: active listening utilized     Problem: Infection (Extremity Amputation)  Goal: Absence of Infection Signs and Symptoms  Intervention: Prevent or Manage Infection  Recent Flowsheet Documentation  Taken 11/6/2022 0600 by Melvin Suárez RN  Infection Prevention: environmental surveillance performed  Taken 11/6/2022 0400 by Melvin Suárez RN  Infection Prevention: environmental surveillance performed  Taken 11/6/2022 0200 by Melvin Suárez RN  Infection Prevention: environmental surveillance performed  Taken 11/6/2022 0000 by Melvin Suárez RN  Infection Prevention: environmental surveillance performed  Taken 11/5/2022 2200 by Melvin Suárez RN  Infection Prevention: environmental surveillance performed  Taken 11/5/2022 2000 by Melvin Suárez RN  Infection Prevention: environmental surveillance performed  Goal: Absence of Infection Signs and Symptoms  Intervention: Prevent or Manage Infection  Recent Flowsheet Documentation  Taken 11/6/2022 0600 by Melvin Suárez RN  Infection Prevention: environmental surveillance performed  Taken 11/6/2022 0400 by Melvin Suárez RN  Infection Prevention: environmental surveillance performed  Taken 11/6/2022 0200 by Melvin Suárez RN  Infection Prevention: environmental surveillance performed  Taken 11/6/2022 0000 by Melvin Suárez RN  Infection Prevention: environmental surveillance performed  Taken 11/5/2022 2200 by Melvin Suárez RN  Infection Prevention:  environmental surveillance performed  Taken 11/5/2022 2000 by Melvin Suárez RN  Infection Prevention: environmental surveillance performed     Problem: Ongoing Anesthesia Effects (Extremity Amputation)  Goal: Anesthesia/Sedation Recovery  Intervention: Optimize Anesthesia Recovery  Recent Flowsheet Documentation  Taken 11/6/2022 0600 by Melvin Suárez RN  Safety Promotion/Fall Prevention:   activity supervised   safety round/check completed   toileting scheduled  Taken 11/6/2022 0400 by Melvin Suárez RN  Safety Promotion/Fall Prevention:   activity supervised   safety round/check completed   toileting scheduled  Taken 11/6/2022 0200 by Melvin Suárez RN  Safety Promotion/Fall Prevention:   activity supervised   safety round/check completed   toileting scheduled  Taken 11/6/2022 0000 by Melvin Suárez RN  Safety Promotion/Fall Prevention:   activity supervised   safety round/check completed   toileting scheduled  Taken 11/5/2022 2200 by Melvin Suárez RN  Safety Promotion/Fall Prevention:   activity supervised   safety round/check completed   toileting scheduled  Taken 11/5/2022 2000 by Melvin Suárez RN  Safety Promotion/Fall Prevention:   activity supervised   safety round/check completed   toileting scheduled  Goal: Anesthesia/Sedation Recovery  Intervention: Optimize Anesthesia Recovery  Recent Flowsheet Documentation  Taken 11/6/2022 0600 by Melvin Suárez RN  Safety Promotion/Fall Prevention:   activity supervised   safety round/check completed   toileting scheduled  Taken 11/6/2022 0400 by Melvin Suárez RN  Safety Promotion/Fall Prevention:   activity supervised   safety round/check completed   toileting scheduled  Taken 11/6/2022 0200 by Melvin Suárez RN  Safety Promotion/Fall Prevention:   activity supervised   safety round/check completed   toileting scheduled  Taken 11/6/2022 0000 by Melvin Suárez RN  Safety Promotion/Fall Prevention:   activity supervised   safety round/check  completed   toileting scheduled  Taken 11/5/2022 2200 by Melvin Suárez RN  Safety Promotion/Fall Prevention:   activity supervised   safety round/check completed   toileting scheduled  Taken 11/5/2022 2000 by Melvin Suárez RN  Safety Promotion/Fall Prevention:   activity supervised   safety round/check completed   toileting scheduled     Problem: Pain (Extremity Amputation)  Goal: Acceptable Pain Control  Intervention: Prevent or Manage Pain  Recent Flowsheet Documentation  Taken 11/6/2022 0600 by Mlevin Suárez RN  Pain Management Interventions: quiet environment facilitated  Taken 11/6/2022 0400 by Melvin Suárez RN  Pain Management Interventions: quiet environment facilitated  Taken 11/6/2022 0200 by Melvin Suárez RN  Pain Management Interventions: quiet environment facilitated  Taken 11/6/2022 0000 by Melvin Suárez RN  Pain Management Interventions: quiet environment facilitated  Taken 11/5/2022 2200 by Melvin Suárez RN  Pain Management Interventions: quiet environment facilitated  Taken 11/5/2022 2000 by Melvin Suárez RN  Pain Management Interventions: quiet environment facilitated  Goal: Acceptable Pain Control  Intervention: Prevent or Manage Pain  Recent Flowsheet Documentation  Taken 11/6/2022 0600 by Melvin Suárez RN  Pain Management Interventions: quiet environment facilitated  Taken 11/6/2022 0400 by Melvin Suárez RN  Pain Management Interventions: quiet environment facilitated  Taken 11/6/2022 0200 by Melvin Suárez RN  Pain Management Interventions: quiet environment facilitated  Taken 11/6/2022 0000 by Melvin Suárez RN  Pain Management Interventions: quiet environment facilitated  Taken 11/5/2022 2200 by Melvin Suárez RN  Pain Management Interventions: quiet environment facilitated  Taken 11/5/2022 2000 by Melvin Suárez RN  Pain Management Interventions: quiet environment facilitated     Problem: Skin Injury Risk Increased  Goal: Skin Health and  Integrity  Intervention: Optimize Skin Protection  Recent Flowsheet Documentation  Taken 11/6/2022 0600 by Melvin Suárez RN  Pressure Reduction Techniques: frequent weight shift encouraged  Head of Bed (HOB) Positioning: HOB at 30-45 degrees  Pressure Reduction Devices: pressure-redistributing mattress utilized  Skin Protection: adhesive use limited  Taken 11/6/2022 0400 by Melvin Suárez RN  Pressure Reduction Techniques: frequent weight shift encouraged  Head of Bed (HOB) Positioning: HOB at 20-30 degrees  Pressure Reduction Devices: pressure-redistributing mattress utilized  Skin Protection: adhesive use limited  Taken 11/6/2022 0200 by Melvin Suárez RN  Pressure Reduction Techniques: frequent weight shift encouraged  Head of Bed (HOB) Positioning: HOB at 20-30 degrees  Pressure Reduction Devices: pressure-redistributing mattress utilized  Skin Protection: adhesive use limited  Taken 11/6/2022 0000 by Melvin Suárez RN  Pressure Reduction Techniques: frequent weight shift encouraged  Head of Bed (HOB) Positioning: HOB at 20-30 degrees  Pressure Reduction Devices: pressure-redistributing mattress utilized  Skin Protection: adhesive use limited  Taken 11/5/2022 2200 by Melvin Suárez RN  Pressure Reduction Techniques: frequent weight shift encouraged  Head of Bed (HOB) Positioning: HOB at 20-30 degrees  Pressure Reduction Devices: pressure-redistributing mattress utilized  Skin Protection: adhesive use limited  Taken 11/5/2022 2000 by Melvin Suárez RN  Pressure Reduction Techniques: frequent weight shift encouraged  Head of Bed (HOB) Positioning: HOB at 20-30 degrees  Pressure Reduction Devices: pressure-redistributing mattress utilized  Skin Protection: adhesive use limited  Goal: Skin Health and Integrity  Intervention: Optimize Skin Protection  Recent Flowsheet Documentation  Taken 11/6/2022 0600 by Melvin Suárez RN  Pressure Reduction Techniques: frequent weight shift encouraged  Head of Bed  (HOB) Positioning: HOB at 30-45 degrees  Pressure Reduction Devices: pressure-redistributing mattress utilized  Skin Protection: adhesive use limited  Taken 11/6/2022 0400 by Melvin Suárez RN  Pressure Reduction Techniques: frequent weight shift encouraged  Head of Bed (HOB) Positioning: HOB at 20-30 degrees  Pressure Reduction Devices: pressure-redistributing mattress utilized  Skin Protection: adhesive use limited  Taken 11/6/2022 0200 by Melvin Suárez RN  Pressure Reduction Techniques: frequent weight shift encouraged  Head of Bed (HOB) Positioning: HOB at 20-30 degrees  Pressure Reduction Devices: pressure-redistributing mattress utilized  Skin Protection: adhesive use limited  Taken 11/6/2022 0000 by Melvin Suárez RN  Pressure Reduction Techniques: frequent weight shift encouraged  Head of Bed (HOB) Positioning: HOB at 20-30 degrees  Pressure Reduction Devices: pressure-redistributing mattress utilized  Skin Protection: adhesive use limited  Taken 11/5/2022 2200 by Melvin Suárez RN  Pressure Reduction Techniques: frequent weight shift encouraged  Head of Bed (HOB) Positioning: HOB at 20-30 degrees  Pressure Reduction Devices: pressure-redistributing mattress utilized  Skin Protection: adhesive use limited  Taken 11/5/2022 2000 by Melvin Suárez RN  Pressure Reduction Techniques: frequent weight shift encouraged  Head of Bed (HOB) Positioning: HOB at 20-30 degrees  Pressure Reduction Devices: pressure-redistributing mattress utilized  Skin Protection: adhesive use limited     Problem: Fall Injury Risk  Goal: Absence of Fall and Fall-Related Injury  Intervention: Identify and Manage Contributors  Recent Flowsheet Documentation  Taken 11/6/2022 0600 by Melvin Suárez RN  Medication Review/Management: medications reviewed  Taken 11/6/2022 0400 by Melvin Suárez RN  Medication Review/Management: medications reviewed  Taken 11/6/2022 0200 by Melvin Suárez RN  Medication Review/Management:  medications reviewed  Taken 11/6/2022 0000 by Melvin Suárez RN  Medication Review/Management: medications reviewed  Taken 11/5/2022 2200 by Melvin Suárez RN  Medication Review/Management: medications reviewed  Taken 11/5/2022 2000 by Melvin Suárez RN  Medication Review/Management: medications reviewed  Intervention: Promote Injury-Free Environment  Recent Flowsheet Documentation  Taken 11/6/2022 0600 by Melvin Suárez RN  Safety Promotion/Fall Prevention:   activity supervised   safety round/check completed   toileting scheduled  Taken 11/6/2022 0400 by Melvin Suárez RN  Safety Promotion/Fall Prevention:   activity supervised   safety round/check completed   toileting scheduled  Taken 11/6/2022 0200 by Melvin Suárez RN  Safety Promotion/Fall Prevention:   activity supervised   safety round/check completed   toileting scheduled  Taken 11/6/2022 0000 by Melvin Suárez RN  Safety Promotion/Fall Prevention:   activity supervised   safety round/check completed   toileting scheduled  Taken 11/5/2022 2200 by Melvin Suárez RN  Safety Promotion/Fall Prevention:   activity supervised   safety round/check completed   toileting scheduled  Taken 11/5/2022 2000 by Melvin Suárez RN  Safety Promotion/Fall Prevention:   activity supervised   safety round/check completed   toileting scheduled  Goal: Absence of Fall and Fall-Related Injury  Intervention: Identify and Manage Contributors  Recent Flowsheet Documentation  Taken 11/6/2022 0600 by Melvin Suárez RN  Medication Review/Management: medications reviewed  Taken 11/6/2022 0400 by Melvin Suárez RN  Medication Review/Management: medications reviewed  Taken 11/6/2022 0200 by Melvin Suárez RN  Medication Review/Management: medications reviewed  Taken 11/6/2022 0000 by Melvin Suárez RN  Medication Review/Management: medications reviewed  Taken 11/5/2022 2200 by Melvin Suárez RN  Medication Review/Management: medications reviewed  Taken 11/5/2022  2000 by Melvin Suárez RN  Medication Review/Management: medications reviewed  Intervention: Promote Injury-Free Environment  Recent Flowsheet Documentation  Taken 11/6/2022 0600 by Melvin Suárez RN  Safety Promotion/Fall Prevention:   activity supervised   safety round/check completed   toileting scheduled  Taken 11/6/2022 0400 by Melvin Suárez RN  Safety Promotion/Fall Prevention:   activity supervised   safety round/check completed   toileting scheduled  Taken 11/6/2022 0200 by Melvin Suárez RN  Safety Promotion/Fall Prevention:   activity supervised   safety round/check completed   toileting scheduled  Taken 11/6/2022 0000 by Melvin Suárez RN  Safety Promotion/Fall Prevention:   activity supervised   safety round/check completed   toileting scheduled  Taken 11/5/2022 2200 by Melvin Suárez RN  Safety Promotion/Fall Prevention:   activity supervised   safety round/check completed   toileting scheduled  Taken 11/5/2022 2000 by Melvin Suárez RN  Safety Promotion/Fall Prevention:   activity supervised   safety round/check completed   toileting scheduled     Problem: Pain Acute  Goal: Acceptable Pain Control and Functional Ability  Intervention: Prevent or Manage Pain  Recent Flowsheet Documentation  Taken 11/6/2022 0600 by Melvin Suárez RN  Medication Review/Management: medications reviewed  Taken 11/6/2022 0400 by Melvin Suárez RN  Medication Review/Management: medications reviewed  Taken 11/6/2022 0200 by Melvin Suárez RN  Medication Review/Management: medications reviewed  Taken 11/6/2022 0000 by Melvin Suárez RN  Medication Review/Management: medications reviewed  Taken 11/5/2022 2200 by Melvin Suárez RN  Medication Review/Management: medications reviewed  Taken 11/5/2022 2000 by Melvin Suárez RN  Sensory Stimulation Regulation: care clustered  Medication Review/Management: medications reviewed  Intervention: Develop Pain Management Plan  Recent Flowsheet Documentation  Taken  11/6/2022 0600 by Melvin Suárez RN  Pain Management Interventions: quiet environment facilitated  Taken 11/6/2022 0400 by Melvin Suárez RN  Pain Management Interventions: quiet environment facilitated  Taken 11/6/2022 0200 by Melvin Suárez RN  Pain Management Interventions: quiet environment facilitated  Taken 11/6/2022 0000 by Melvin Suárez RN  Pain Management Interventions: quiet environment facilitated  Taken 11/5/2022 2200 by Melvin Suárez RN  Pain Management Interventions: quiet environment facilitated  Taken 11/5/2022 2000 by Melvin Suárez RN  Pain Management Interventions: quiet environment facilitated  Intervention: Optimize Psychosocial Wellbeing  Recent Flowsheet Documentation  Taken 11/6/2022 0200 by Melvin Suárez RN  Supportive Measures: active listening utilized  Taken 11/5/2022 2200 by Melvin Suárez RN  Supportive Measures: active listening utilized     Problem: 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

## 2022-11-06 NOTE — PROGRESS NOTES
Pharmacy Consult-Vancomycin Dosing  Omkar Nava is a  67 y.o. male receiving vancomycin therapy.     Indication: L foot osteomyelitis   Consulting Provider: Hospitalist  ID Consult: SARAH Gordon     Goal AUC: 400 - 600 mg/L*hr    Current Antimicrobial Therapy  Anti-Infectives (From admission, onward)      Ordered     Dose/Rate Route Frequency Start Stop    10/31/22 1407  vancomycin in dextrose 5% 150 mL (VANCOCIN) IVPB 750 mg        Ordering Provider: Carolina Arreaga RPH    750 mg  over 60 Minutes Intravenous Every 12 Hours 10/31/22 1500 11/07/22 1459    10/27/22 0851  cefTRIAXone (ROCEPHIN) 2 g/100 mL 0.9% NS IVPB (MBP)        Ordering Provider: Nicole Henriquez DO    2 g Intravenous Every 24 Hours 10/27/22 0000 11/19/22 2359    10/27/22 0851  vancomycin (VANCOCIN) 1-5 GM/200ML-% IVPB        Ordering Provider: Nicole Henriquez DO    1,000 mg Intravenous Every 12 Hours 10/27/22 0000 11/19/22 2359    10/20/22 1010  cefTRIAXone (ROCEPHIN) 2 g/100 mL 0.9% NS IVPB (MBP)        Ordering Provider: Rayray Gordon MD    2 g  over 30 Minutes Intravenous Every 24 Hours 10/20/22 1100 11/19/22 1059    10/14/22 2306  cefepime (MAXIPIME) 2 g/100 mL 0.9% NS (mbp)        Ordering Provider: Yeison Petty MD    2 g  over 4 Hours Intravenous Every 8 Hours 10/15/22 0800 10/20/22 0621    10/14/22 2306  cefepime (MAXIPIME) 2 g/100 mL 0.9% NS (mbp)        Ordering Provider: Norma Wei APRN    2 g  200 mL/hr over 30 Minutes Intravenous Once 10/14/22 2308 10/15/22 0142    10/14/22 2306  Pharmacy to dose vancomycin        Ordering Provider: Yeison Petty MD     Does not apply Continuous PRN 10/14/22 2306 10/19/22 2305    10/14/22 2029  vancomycin 1750 mg/500 mL 0.9% NS IVPB ([facility])        Ordering Provider: Omkar Seth MD    20 mg/kg × 91.2 kg Intravenous Once 10/14/22 2031 10/14/22 2157    10/14/22 2029  cefepime (MAXIPIME) 2 g/100 mL 0.9% NS (mbp)        Ordering Provider: Omkar Seth MD    2 g  200  "mL/hr over 30 Minutes Intravenous Once 10/14/22 2031 10/14/22 2155          Allergies  Allergies as of 10/14/2022    (No Known Allergies)     Labs  Results from last 7 days   Lab Units 11/04/22  0417 11/02/22  0844 10/31/22  0535   BUN mg/dL 19 22 19   CREATININE mg/dL 0.92 1.00 1.07     Results from last 7 days   Lab Units 11/05/22  1111 11/02/22  0844 10/31/22  0535   WBC 10*3/mm3 5.79 6.07 9.55     Evaluation of Dosing     Last Dose Received in the ED/Outside Facility: 1750 mg (19mg/kg) on 10/14 @ 2157  Is Patient on Dialysis or Renal Replacement: No    Ht - 182.9 cm (72\")  Wt - 90.2 kg (198 lb 14.4 oz)    Estimated Creatinine Clearance: 102.1 mL/min (by C-G formula based on SCr of 0.92 mg/dL).    Intake & Output (last 3 days)         11/03 0701 11/04 0700 11/04 0701 11/05 0700 11/05 0701  11/06 0700 11/06 0701  11/07 0700    P.O. 957 480      IV Piggyback  250      Total Intake(mL/kg) 957 (11.1) 730 (8.1)      Urine (mL/kg/hr) 1275 (0.6) 575 (0.3) 400 (0.2)     Stool 0 0 0     Total Output 1275 575 400     Net -318 +155 -400             Urine Unmeasured Occurrence 2 x 3 x      Stool Unmeasured Occurrence 1 x 2 x 1 x           Microbiology and Radiology  Microbiology Results (last 10 days)       ** No results found for the last 240 hours. **          Reported Vancomycin Levels  Results from last 7 days   Lab Units 10/31/22  1250   VANCOMYCIN RM mcg/mL 21.20             [facility] AUC Calculation:    Current AUC: 450 mg/L*hr    Predicted Steady State AUC on Current Dose: 462 mg/L*hr  _________________________________    Predicted Steady State AUC on New Dose: -- mg/L*hr    Assessment/Plan:    Pharmacy to dose vancomycin for L foot OM.   Patient currently on a maintenance dose of vancomycin 750mg (~8 mg/kg) IV q12h.  No labs today to assess clearance, UOP remains stable if being charted correctly. Labs ordered for tomorrow to assess renal function.   Continue current regimen and reassess clearance by trough level " on Monday, 11/7.    Pharmacy will continue to monitor renal function, cultures and sensitivities, and clinical status to adjust regimen as necessary.    Thank you,  Adrián Padron, PharmD  Pharmacy Resident   11/6/2022  07:23 EST

## 2022-11-07 LAB
ANION GAP SERPL CALCULATED.3IONS-SCNC: 13 MMOL/L (ref 5–15)
BUN SERPL-MCNC: 29 MG/DL (ref 8–23)
BUN/CREAT SERPL: 29.9 (ref 7–25)
CALCIUM SPEC-SCNC: 9.1 MG/DL (ref 8.6–10.5)
CHLORIDE SERPL-SCNC: 104 MMOL/L (ref 98–107)
CO2 SERPL-SCNC: 21 MMOL/L (ref 22–29)
CREAT SERPL-MCNC: 0.97 MG/DL (ref 0.76–1.27)
EGFRCR SERPLBLD CKD-EPI 2021: 86.6 ML/MIN/1.73
GLUCOSE BLDC GLUCOMTR-MCNC: 144 MG/DL (ref 70–130)
GLUCOSE BLDC GLUCOMTR-MCNC: 172 MG/DL (ref 70–130)
GLUCOSE BLDC GLUCOMTR-MCNC: 194 MG/DL (ref 70–130)
GLUCOSE SERPL-MCNC: 181 MG/DL (ref 65–99)
POTASSIUM SERPL-SCNC: 4.3 MMOL/L (ref 3.5–5.2)
SODIUM SERPL-SCNC: 138 MMOL/L (ref 136–145)
VANCOMYCIN SERPL-MCNC: 24.6 MCG/ML (ref 5–40)

## 2022-11-07 PROCEDURE — 82962 GLUCOSE BLOOD TEST: CPT

## 2022-11-07 PROCEDURE — 97164 PT RE-EVAL EST PLAN CARE: CPT

## 2022-11-07 PROCEDURE — 97535 SELF CARE MNGMENT TRAINING: CPT

## 2022-11-07 PROCEDURE — 97110 THERAPEUTIC EXERCISES: CPT

## 2022-11-07 PROCEDURE — 97116 GAIT TRAINING THERAPY: CPT

## 2022-11-07 PROCEDURE — 80048 BASIC METABOLIC PNL TOTAL CA: CPT

## 2022-11-07 PROCEDURE — 25010000002 VANCOMYCIN PER 500 MG

## 2022-11-07 PROCEDURE — 25010000002 CEFTRIAXONE PER 250 MG

## 2022-11-07 PROCEDURE — 97168 OT RE-EVAL EST PLAN CARE: CPT

## 2022-11-07 PROCEDURE — 99232 SBSQ HOSP IP/OBS MODERATE 35: CPT | Performed by: PHYSICIAN ASSISTANT

## 2022-11-07 PROCEDURE — 25010000002 HEPARIN (PORCINE) PER 1000 UNITS: Performed by: ORTHOPAEDIC SURGERY

## 2022-11-07 PROCEDURE — 80202 ASSAY OF VANCOMYCIN: CPT

## 2022-11-07 RX ORDER — GLIPIZIDE 5 MG/1
2.5 TABLET ORAL
Status: DISCONTINUED | OUTPATIENT
Start: 2022-11-07 | End: 2022-11-16 | Stop reason: HOSPADM

## 2022-11-07 RX ORDER — NIFEDIPINE 30 MG/1
30 TABLET, EXTENDED RELEASE ORAL
Status: DISCONTINUED | OUTPATIENT
Start: 2022-11-07 | End: 2022-11-16 | Stop reason: HOSPADM

## 2022-11-07 RX ADMIN — Medication 10 ML: at 04:35

## 2022-11-07 RX ADMIN — METFORMIN HYDROCHLORIDE 1000 MG: 1000 TABLET, FILM COATED ORAL at 08:34

## 2022-11-07 RX ADMIN — LISINOPRIL 40 MG: 40 TABLET ORAL at 08:34

## 2022-11-07 RX ADMIN — VANCOMYCIN HYDROCHLORIDE 750 MG: 750 INJECTION, SOLUTION INTRAVENOUS at 16:00

## 2022-11-07 RX ADMIN — HEPARIN SODIUM 5000 UNITS: 5000 INJECTION INTRAVENOUS; SUBCUTANEOUS at 21:01

## 2022-11-07 RX ADMIN — Medication 10 ML: at 08:37

## 2022-11-07 RX ADMIN — SENNOSIDES AND DOCUSATE SODIUM 2 TABLET: 50; 8.6 TABLET ORAL at 08:34

## 2022-11-07 RX ADMIN — SODIUM CHLORIDE 2 G: 900 INJECTION INTRAVENOUS at 10:59

## 2022-11-07 RX ADMIN — HEPARIN SODIUM 5000 UNITS: 5000 INJECTION INTRAVENOUS; SUBCUTANEOUS at 05:12

## 2022-11-07 RX ADMIN — HEPARIN SODIUM 5000 UNITS: 5000 INJECTION INTRAVENOUS; SUBCUTANEOUS at 13:12

## 2022-11-07 RX ADMIN — NIFEDIPINE 30 MG: 30 TABLET, FILM COATED, EXTENDED RELEASE ORAL at 08:42

## 2022-11-07 RX ADMIN — OLANZAPINE 10 MG: 5 TABLET, FILM COATED ORAL at 21:00

## 2022-11-07 RX ADMIN — GLIPIZIDE 2.5 MG: 5 TABLET ORAL at 17:16

## 2022-11-07 RX ADMIN — METFORMIN HYDROCHLORIDE 1000 MG: 1000 TABLET, FILM COATED ORAL at 17:16

## 2022-11-07 RX ADMIN — VANCOMYCIN HYDROCHLORIDE 750 MG: 750 INJECTION, SOLUTION INTRAVENOUS at 04:34

## 2022-11-07 RX ADMIN — OLANZAPINE 5 MG: 5 TABLET, FILM COATED ORAL at 08:33

## 2022-11-07 RX ADMIN — SENNOSIDES AND DOCUSATE SODIUM 2 TABLET: 50; 8.6 TABLET ORAL at 21:00

## 2022-11-07 RX ADMIN — FERROUS SULFATE TAB 325 MG (65 MG ELEMENTAL FE) 325 MG: 325 (65 FE) TAB at 08:35

## 2022-11-07 NOTE — DISCHARGE PLACEMENT REQUEST
"Prema Sung (67 y.o. Male)   Oscar Bang RN Case Manager  [phone]    Date of Birth   1955    Social Security Number       Address   [address] [place] [address]    Home Phone   [phone]    MRN   [id number]       Jehovah's witness   None    Marital Status   Single                            Admission Date   10/14/22    Admission Type   Emergency    Admitting Provider   Wilfredo Lowe MD    Attending Provider   Wilfredo Lowe MD    Department, Room/Bed   [facility] 3G, S363/1       Discharge Date       Discharge Disposition       Discharge Destination                               Attending Provider: Wilfredo Lowe MD    Allergies: No Known Allergies    Isolation: None   Infection: None   Code Status: CPR    Ht: 182.9 cm (72\")   Wt: 87.4 kg (192 lb 9.6 oz)    Admission Cmt: None   Principal Problem: Acute osteomyelitis of left foot (HCC) [M86.172]                 Active Insurance as of 10/14/2022     Primary Coverage     Payor Plan Insurance Group Employer/Plan Group    MEDICARE MEDICARE A & B      Payor Plan Address Payor Plan Phone Number Payor Plan Fax Number Effective Dates    [address] [phone]  8/1/2022 - None Entered    [facility] [id number]       Subscriber Name Subscriber Birth Date Member ID       PREMA SUNG 1955 [id number]           Secondary Coverage     Payor Plan Insurance Group Employer/Plan Group    [facility] COMMUNITY PLAN [facility] COMMUNITY PLAN [facility]     Payor Plan Address Payor Plan Phone Number Payor Plan Fax Number Effective Dates    [address]   9/1/2022 - None Entered    [facility] [id number]       Subscriber Name Subscriber Birth Date Member ID       PREMA SUNG 1955 [id number]                 Emergency Contacts      (Rel.) Home Phone Work Phone Mobile Phone    Idalia Lerma (Daughter) -- -- [phone]    Clive Hortonevelin (Sister) [phone] -- --               Physician Progress Notes (last 24 hours)    " "  Yeison Petty MD at 22 1003                    Orthopaedic Surgery Progress Note    LOS: 24 days   Patient Care Team:  Provider, No Known as PCP - General  POD 20 Days Post-Op   Procedure(s):  PARTIAL FIRST RAY AMPUTATION LEFT  Subjective   Interval History:   Resting in bed, answering questions, pain well controlled, no new complaints    Objective     Vital Signs:  Temp (24hrs), Av.1 °F (36.7 °C), Min:97.6 °F (36.4 °C), Max:98.9 °F (37.2 °C)    /69   Pulse 77   Temp 97.7 °F (36.5 °C) (Oral)   Resp 16   Ht 182.9 cm (72\")   Wt 87.4 kg (192 lb 9.6 oz)   SpO2 94%   BMI 26.12 kg/m²     Labs:  Lab Results (last 24 hours)     Procedure Component Value Units Date/Time    POC Glucose Once [[id number]]  (Abnormal) Collected: 22 0720    Specimen: Blood Updated: 22 0722     Glucose 172 mg/dL      Comment: Meter: [id number] : [id number] Vannesa Villanueva       POC Glucose Once [[id number]]  (Abnormal) Collected: 22 1619    Specimen: Blood Updated: 22 1621     Glucose 184 mg/dL      Comment: Meter: [id number] : [id number] Josse Macias       POC Glucose Once [[id number]]  (Abnormal) Collected: 22 1140    Specimen: Blood Updated: 22 1141     Glucose 182 mg/dL      Comment: Meter: [id number] : [id number] Josse Macias             Physical Exam:  left LE: Leg compartments soft/compressible              Splint and dressing removed for exam              Incision clean dry and intact, sutures in place, no drainage, some evidence of necrosis at the skin edges at the incision distally, margin of necrosis stable (see new photo in chart)              Lesser toes are warm and well perfused, palpable DP pulse    Assessment & Plan   -Splint removed , incision CDI with sutures in place  -Patient can be heel weightbearing as tolerated in forefoot offloading shoe on operative extremity  -DVT prophylaxis, mechanical and subq hep, rec DC home on  Daily (x30 " days)  -Antibiotics per ID recommendations  -Pain control  -Elevate operative extremity  -Okay for DC from ortho standpoint, f/u in clinic next week for wound check/suture removal (week of )  -Rest of management per primary team    Yeison Petty MD  22  10:03 EST        Electronically signed by Yeison Petty MD at 22 1005     Elena Ugalde PA-C at 22 0842              [facility] Medicine Services  PROGRESS NOTE    Patient Name: Omkar Nava  : 1955  MRN: [id number]    Date of Admission: 10/14/2022  Primary Care Physician: Provider, No Known    Subjective     CC: L foot osteomyelitis     HPI:  Sitting in bed, no family at bedside assisting. Finishing breakfast. Says he slept well last night. No issues per nursing staff. BP has been elevated. Reasonable glucose control on Metformin. He is wondering when his surgeon will reassess his foot and when he can begin walking on his heel.     ROS:  Gen- No fevers, chills  CV- No chest pain, palpitations  Resp- No cough, dyspnea  GI- No N/V/D, abd pain    Objective     Vital Signs:   Temp:  [97.6 °F (36.4 °C)-98.9 °F (37.2 °C)] 97.7 °F (36.5 °C)  Heart Rate:  [72-86] 77  Resp:  [16-18] 16  BP: (142-161)/() 145/69     Physical Exam:  Constitutional: No acute distress, awake, alert and conversant. Sitting up in bed   HENT: NCAT, mucous membranes moist  Respiratory: Clear to auscultation bilaterally, respiratory effort normal on room air  Cardiovascular: RRR, no murmurs, rubs, or gallops  Gastrointestinal: Positive bowel sounds, soft, nontender, nondistended  Musculoskeletal: L foot in splint - able to wiggle toes.   Psychiatric: Appropriate affect, cooperative  Neurologic: Moves all extremities spontaneously without focal deficits. Speech clear and coherent     Results Reviewed:  LAB RESULTS:      Lab 22  1111 22  0844   WBC 5.79 6.07   HEMOGLOBIN 7.9* 7.7*   HEMATOCRIT 26.4* 25.4*   PLATELETS 388  296   MCV 91.7 91.7         Lab 11/04/22  0417 11/02/22  0844   SODIUM 141 135*   POTASSIUM 4.4 4.1   CHLORIDE 105 101   CO2 28.0 24.0   ANION GAP 8.0 10.0   BUN 19 22   CREATININE 0.92 1.00   EGFR 92.3 83.5   GLUCOSE 105* 174*   CALCIUM 9.2 9.0         Brief Urine Lab Results  (Last result in the past 365 days)      Color   Clarity   Blood   Leuk Est   Nitrite   Protein   CREAT   Urine HCG        06/12/22 2056 Yellow   Clear     Negative                   Microbiology Results Abnormal     Procedure Component Value - Date/Time    Fungus Culture - Tissue, Toe, Left [[id number]] Collected: 10/18/22 1608    Lab Status: Preliminary result Specimen: Tissue from Toe, Left Updated: 11/01/22 1815     Fungus Culture No fungus isolated at 2 weeks    AFB Culture - Tissue, Toe, Left [[id number]] Collected: 10/18/22 1608    Lab Status: Preliminary result Specimen: Tissue from Toe, Left Updated: 11/01/22 1815     AFB Culture No AFB isolated at 2 weeks     AFB Stain No acid fast bacilli seen on concentrated smear    Fungus Culture - Tissue, Toe, Left [[id number]] Collected: 10/18/22 1552    Lab Status: Preliminary result Specimen: Tissue from Toe, Left Updated: 11/01/22 1801     Fungus Culture No fungus isolated at 2 weeks    AFB Culture - Tissue, Toe, Left [[id number]] Collected: 10/18/22 1552    Lab Status: Preliminary result Specimen: Tissue from Toe, Left Updated: 11/01/22 1801     AFB Culture No AFB isolated at 2 weeks     AFB Stain No acid fast bacilli seen on concentrated smear    COVID PRE-OP / PRE-PROCEDURE SCREENING ORDER (NO ISOLATION) - Swab, Nasopharynx [[id number]]  (Normal) Collected: 10/27/22 0510    Lab Status: Final result Specimen: Swab from Nasopharynx Updated: 10/27/22 0530    Narrative:      The following orders were created for panel order COVID PRE-OP / PRE-PROCEDURE SCREENING ORDER (NO ISOLATION) - Swab, Nasopharynx.  Procedure                               Abnormality         Status                      ---------                               -----------         ------                     COVID-19, ABBOTT IN-HOUS...[[id number]]  Normal              Final result                 Please view results for these tests on the individual orders.    COVID-19, ABBOTT IN-HOUSE,NASAL Swab (NO TRANSPORT MEDIA) 2 HR TAT - Swab, Nasopharynx [[id number]]  (Normal) Collected: 10/27/22 0510    Lab Status: Final result Specimen: Swab from Nasopharynx Updated: 10/27/22 0530     COVID19 Presumptive Negative    Narrative:      Fact sheet for providers: https://www.fda.gov/media/718025/download     Fact sheet for patients: https://www.fda.gov/media/065063/download    Test performed by PCR.  If inconsistent with clinical signs and symptoms patient should be tested with different authorized molecular test.    Anaerobic Culture - Tissue, Toe, Left [[id number]]  (Normal) Collected: 10/18/22 1608    Lab Status: Final result Specimen: Tissue from Toe, Left Updated: 10/23/22 0543     Anaerobic Culture No anaerobes isolated at 5 days    Anaerobic Culture - Tissue, Toe, Left [[id number]]  (Normal) Collected: 10/18/22 1552    Lab Status: Final result Specimen: Tissue from Toe, Left Updated: 10/23/22 0543     Anaerobic Culture No anaerobes isolated at 5 days    Tissue / Bone Culture - Tissue, Toe, Left [[id number]] Collected: 10/18/22 1552    Lab Status: Final result Specimen: Tissue from Toe, Left Updated: 10/21/22 0840     Tissue Culture No growth at 3 days     Gram Stain Moderate (3+) WBCs seen      No organisms seen    Blood Culture - Blood, Hand, Left [[id number]]  (Normal) Collected: 10/14/22 2115    Lab Status: Final result Specimen: Blood from Hand, Left Updated: 10/19/22 2215     Blood Culture No growth at 5 days    Blood Culture - Blood, Arm, Left [[id number]]  (Normal) Collected: 10/14/22 2115    Lab Status: Final result Specimen: Blood from Arm, Left Updated: 10/19/22 2215     Blood Culture No growth at 5 days    MRSA Screen, PCR (Inpatient)  - Swab, Nares [[id number]]  (Normal) Collected: 10/14/22 9663    Lab Status: Final result Specimen: Swab from Nares Updated: 10/15/22 0700     MRSA PCR Negative    Narrative:      The negative predictive value of this diagnostic test is high and should only be used to consider de-escalating anti-MRSA therapy. A positive result may indicate colonization with MRSA and must be correlated clinically.  MRSA Negative        No radiology results from the last 24 hrs    I have reviewed the medications:  Scheduled Meds:cefTRIAXone, 2 g, Intravenous, Q24H  ferrous sulfate, 325 mg, Oral, Daily With Breakfast  glipizide, 2.5 mg, Oral, BID AC  heparin (porcine), 5,000 Units, Subcutaneous, Q8H  lisinopril, 40 mg, Oral, Daily  metFORMIN, 1,000 mg, Oral, BID With Meals  NIFEdipine XL, 30 mg, Oral, Q24H  OLANZapine, 5 mg, Oral, Daily   And  OLANZapine, 10 mg, Oral, Nightly  senna-docusate sodium, 2 tablet, Oral, BID  sodium chloride, 10 mL, Intravenous, Q12H  vancomycin (dosing per levels), , Does not apply, Daily  vancomycin, 750 mg, Intravenous, Q12H      Continuous Infusions:   PRN Meds:.•  acetaminophen **OR** acetaminophen  •  bisacodyl  •  dextrose  •  dextrose  •  glucagon (human recombinant)  •  magnesium sulfate **OR** magnesium sulfate in D5W 1g/100mL (PREMIX) **OR** magnesium sulfate  •  melatonin  •  [] HYDROmorphone **AND** naloxone  •  polyethylene glycol  •  Insert peripheral IV **AND** sodium chloride    Assessment & Plan     Active Hospital Problems    Diagnosis  POA   • **Acute osteomyelitis of left foot (HCC) [M86.172]  Yes   • Neurocognitive disorder [R41.9]  Unknown   • Type 2 diabetes mellitus (HCC) [E11.9]  Yes   • Essential hypertension [I10]  Yes   • Psychotic disorder (HCC) [F29]  Yes      Resolved Hospital Problems   No resolved problems to display.     Brief Hospital Course to date:  Omkar Nava is a 67 y.o. male with PMH significant for HTN, DMII and unspecified psychotic disorder. He previously  worked as a  but has been unable to work for past 2-3 years and has become homeless.     His health declined in June 2022 with an acute psychotic event with subsequent psychiatric hospitalization at [facility] in [facility] from June to August 2022. He was subsequently transferred to a nursing facility in [place]. On 9/17/22, daughter visited him at the nursing home and he had a significant injury to his L great toe, causing an open articular fracture. He was admitted to [facility] and treated at that time with bedside flushing and repair by podiatry. He was given Ancef and sent back to nursing facility on Keflex. Wound cultures at that time grew MRSA, however blood culture showed no growth. On 9/27/2022, he presented to [facility] in [place] due to worsening foot wound and drainage. He underwent L foot I&D and debridement on 9/30/22. A PICC was placed on 10/3/22. He was to receive Daptomycin but due to expense, this was changed to Rifampin and Linezolid and PICC was removed. He was discharged to SNF in [facility] on 10/6/22 and at some point, was transferred to [place], in order to be closer to family. Daughter visited him on 10/14/22 and became concerned about the appearance of his toe. She brought him to [facility] ED for further evaluation. ID and orthopedic surgery follow.     Osteomyelitis of left foot / L first toe (s/p amputation)  Wound culture (+) Raoultella ornithinolytica   - 10/15/22 MRI of L foot showed wound around the great toe with geographic marrow signal alteration within the distal aspect of the first metatarsal as well as both phalanges of the great toe indicative of osteomyelitis  - He is s/p L great toe amputation on 10/18/22 by Dr. Petty  - NWB to LLE. Elevated LLE. Keep splint clean and dry. SQ heparin for DVT PPX while inpatient. Discharge on ASA 325mg daily x 30 days  - I notified Dr. Petty of patient's continued  hospitalization. He will re-evaluate patient today and anticipates removing splint and allowing patient to begin heel-weight bearing on LLE today   - ID following. PICC placed 10/27/22. Planning for IV Vancomycin / Rocephin through 11/25/22. Follow up with Dr. Gordon in 2 weeks.     Iron-deficiency anemia   - Completed 3 days IV iron on 10/20/22  - Hgb stable. Monitor periodically     Non-insulin dependent DMII  - Hgb A1c 7.0%   - Continue Metformin 1000mg BID, add Glipizide 2.5mg BID   - Continue TID accuchecks     Hypertension  - Stop Amlodipine. Start Nifedipine XL 30mg daily, continue Lisinopril 40mg PO daily     Neurocognitive disorder  Dementia   - Re-started olanzapine 10/15/22, was recently hospitalized at [facility] for psychiatric issues; apparently PRN Haldol was not much help, have discontinued  - Continue Olanzapine 5mg QAM and 10mg QHS  - Overall stable    Expected Discharge Location and Transportation: rehab with goal to transition to LTC. Private vehicle vs WC van   Expected Discharge Date: 11/19/22    DVT prophylaxis:Medical and mechanical DVT prophylaxis orders are present.     AM-PAC 6 Clicks Score (PT): 18 (11/06/22 0800)    CODE STATUS:   Code Status and Medical Interventions:   Ordered at: 10/14/22 2230     Level Of Support Discussed With:    Patient     Code Status (Patient has no pulse and is not breathing):    CPR (Attempt to Resuscitate)     Medical Interventions (Patient has pulse or is breathing):    Full Support     Elena Ugalde PA-C  11/07/22                Electronically signed by Elena Ugalde PA-C at 11/07/22 0846          Physical Therapy Notes (last 24 hours)      Soo Lane, PT at 11/07/22 0922  Version 1 of 1       Goal Outcome Evaluation:  Plan of Care Reviewed With: patient, daughter        Progress: improving  Outcome Evaluation: PT re-eval completed w/ increased LLE weight-bearing status to heel WBAT in forefoot offloading shoe. Patient demonstrated  improved ability to follow commands and participate in therapy. He continues to be limited by weakness, impaired balance, and gait instability. Patient completed bed mobility w/ CGA, transfers w/ mod A x 2 improving to min A x 2, and ambulated 15ft w/ RW and min A x 2. PT goals updated to reflect current level of function. PT continues to recommend SNF rehab at D/C.    Electronically signed by Soo Lane, PT at 22 1674     Soo Lane PT at 22  Version 1 of 1         Patient Name: Omkar Nvaa  : 1955    MRN: [id number]                              Today's Date: 2022       Admit Date: 10/14/2022    Visit Dx:     ICD-10-CM ICD-9-CM   1. Osteomyelitis of toe of left foot (HCC)  M86.9 730.27   2. Anemia, unspecified type  D64.9 285.9   3. Hyperglycemia  R73.9 790.29   4. 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, without long-term current use of insulin   • Orthostatic hypotension   • Acute osteomyelitis of left foot (HCC)   • Type 2 diabetes mellitus (HCC)   • Essential hypertension   • Psychotic disorder (HCC)   • Neurocognitive disorder     Past Medical History:   Diagnosis Date   • Diabetes mellitus (HCC)    • GERD (gastroesophageal reflux disease)      Past Surgical History:   Procedure Laterality Date   • AMPUTATION FOOT Left 10/18/2022    Procedure: PARTIAL FIRST RAY AMPUTATION LEFT;  Surgeon: Yeison Petty MD;  Location: [facility];  Service: Orthopedics;  Laterality: Left;   • EYE SURGERY        General Information     Row Name 22          Physical Therapy Time and Intention    Document Type re-evaluation;therapy note (daily note)  -MB     Mode of Treatment physical therapy  -MB     Row Name 22          General Information     Patient Profile Reviewed yes  -MB     Existing Precautions/Restrictions fall;left;weight bearing;other (see comments)  per ortho 11/7/22: pt heel WBAT while in forefoot offloading shoe at E, baseline vision deficits, dementia  -MB     Barriers to Rehab medically complex;cognitive status;visual deficit  -MB     Row Name 11/07/22 0922          Cognition    Orientation Status (Cognition) oriented to;person;place  -MB     Row Name 11/07/22 0922          Safety Issues, Functional Mobility    Safety Issues Affecting Function (Mobility) awareness of need for assistance;insight into deficits/self-awareness  -MB     Impairments Affecting Function (Mobility) balance;cognition;coordination;endurance/activity tolerance;motor control;postural/trunk control;strength;visual/perceptual  -MB           User Key  (r) = Recorded By, (t) = Taken By, (c) = Cosigned By    Initials Name Provider Type    Soo Ybarra, PT Physical Therapist               Mobility     Row Name 11/07/22 0922          Bed Mobility    Supine-Sit [place] (Bed Mobility) contact guard;verbal cues  -MB     Sit-Supine [place] (Bed Mobility) not tested  -MB     Assistive Device (Bed Mobility) head of bed elevated;bed rails  -MB     Comment, (Bed Mobility) Offloading shoe donned upon arrival. Pt. required brief assist to support trunk, VCs/tactile cues for sequencing, and increased time to complete.  -MB     Row Name 11/07/22 0922          Transfers    Comment, (Transfers) STS from EOB/recliner w/ VCs/tactile cues for safe hand placement and sequencing. Pt. demo posterolateral lean to pt's R in standing, requiring consistent cueing to correct.  -MB     Row Name 11/07/22 0922          Bed-Chair Transfer    Bed-Chair [place] (Transfers) moderate assist (50% patient effort);2 person assist;verbal cues;nonverbal cues (demo/gesture)  -MB     Assistive Device (Bed-Chair Transfers) walker, front-wheeled  -MB     Row Name 11/07/22 0922           Sit-Stand Transfer    Sit-Stand [place] (Transfers) minimum assist (75% patient effort);2 person assist;verbal cues;nonverbal cues (demo/gesture)  -MB     Assistive Device (Sit-Stand Transfers) walker, front-wheeled  -MB     Row Name 11/07/22 0922          Gait/Stairs (Locomotion)    [place] Level (Gait) minimum assist (75% patient effort);2 person assist;verbal cues;nonverbal cues (demo/gesture);1 person to manage equipment  -MB     Assistive Device (Gait) walker, front-wheeled  -MB     Distance in Feet (Gait) 15  -MB     Deviations/Abnormal Patterns (Gait) arianne decreased;left sided deviations;gait speed decreased;antalgic;stride length decreased;weight shifting decreased  -MB     Bilateral Gait Deviations forward flexed posture  -MB     Comment, (Gait/Stairs) Pt. ambulated w/ step to gait mechanics (WBAT LLE in forefoot offloading shoe) w/ slow arianne and dec B step length. Pt. required VCs for step sequencing, upright posture, and assist to manage RW. (Donned R shoe for improved balance/stability.)  -MB     Row Name 11/07/22 0922          Mobility    Extremity Weight-bearing Status left lower extremity  -MB     Left Lower Extremity (Weight-bearing Status) weight-bearing as tolerated (WBAT);other (see comments)  per ortho as of 11/7/22 heel WBAT in forefoot offloading shoe  -MB           User Key  (r) = Recorded By, (t) = Taken By, (c) = Cosigned By    Initials Name Provider Type    MB Soo Lane, PT Physical Therapist               Obj/Interventions     Row Name 11/07/22 1334          Motor Skills    Therapeutic Exercise hip;knee;ankle  -MB     Row Name 11/07/22 1334          Hip (Therapeutic Exercise)    Hip (Therapeutic Exercise) isometric exercises;strengthening exercise  -MB     Hip Isometrics (Therapeutic Exercise) bilateral;gluteal sets;5 repetitions  -MB     Hip Strengthening (Therapeutic Exercise) bilateral;marching while seated;aBduction;15 repititions  -MB     Row Name 11/07/22  1334          Knee (Therapeutic Exercise)    Knee (Therapeutic Exercise) strengthening exercise  -MB     Knee Isometrics (Therapeutic Exercise) bilateral;quad sets;10 repetitions  -MB     Knee Strengthening (Therapeutic Exercise) bilateral;LAQ (long arc quad);10 repetitions  -MB     Row Name 11/07/22 1334          Ankle (Therapeutic Exercise)    Ankle (Therapeutic Exercise) AROM (active range of motion)  -MB     Ankle AROM (Therapeutic Exercise) bilateral;dorsiflexion;plantarflexion;10 repetitions  -MB     Row Name 11/07/22 1334          Balance    Balance Assessment standing static balance;standing dynamic balance  -MB     Static Standing Balance minimal assist;2-person assist  -MB     Dynamic Standing Balance moderate assist;2-person assist  -MB     Position/Device Used, Standing Balance supported;walker, rolling  -MB     Balance Interventions standing;sit to stand;weight shifting activity;dynamic reaching  -MB           User Key  (r) = Recorded By, (t) = Taken By, (c) = Cosigned By    Initials Name Provider Type    MB Soo Lane, PT Physical Therapist               Goals/Plan     Row Name 11/07/22 1336          Bed Mobility Goal 1 (PT)    Activity/Assistive Device (Bed Mobility Goal 1, PT) bed mobility activities, all;sit to supine;supine to sit;scooting  -MB     [place] Level/Cues Needed (Bed Mobility Goal 1, PT) modified independence  -MB     Time Frame (Bed Mobility Goal 1, PT) 1 week  -MB     Progress/Outcomes (Bed Mobility Goal 1, PT) goal revised this date  -MB     Row Name 11/07/22 1336          Transfer Goal 1 (PT)    Activity/Assistive Device (Transfer Goal 1, PT) sit-to-stand/stand-to-sit;bed-to-chair/chair-to-bed  -MB     [place] Level/Cues Needed (Transfer Goal 1, PT) contact guard required  -MB     Time Frame (Transfer Goal 1, PT) 10 days  -MB     Progress/Outcome (Transfer Goal 1, PT) goal revised this date  -MB     Row Name 11/07/22 1336          Gait Training Goal 1 (PT)     Activity/Assistive Device (Gait Training Goal 1, PT) gait (walking locomotion);walker, rolling;improve balance and speed;increase endurance/gait distance;decrease fall risk  WBAT L forefoot offloading shoe  -MB     [place] Level (Gait Training Goal 1, PT) contact guard required  -MB     Distance (Gait Training Goal 1, PT) 50  -MB     Time Frame (Gait Training Goal 1, PT) 2 weeks  -MB     Row Name 11/07/22 1334          Patient Education Goal (PT)    Activity (Patient Education Goal, PT) HEP  -MB     [place]/Cues/Accuracy (Memory Goal 2, PT) demonstrates adequately  -MB     Time Frame (Patient Education Goal, PT) 1 week  -MB     Row Name 11/07/22 1333          Therapy Assessment/Plan (PT)    Planned Therapy Interventions (PT) balance training;bed mobility training;gait training;home exercise program;postural re-education;patient/family education;strengthening;transfer training  -MB           User Key  (r) = Recorded By, (t) = Taken By, (c) = Cosigned By    Initials Name Provider Type    Soo Ybarra, PT Physical Therapist               Clinical Impression     Row Name 11/07/22 3496          Pain    Pretreatment Pain Rating 0/10 - no pain  -MB     Posttreatment Pain Rating 0/10 - no pain  -MB     Row Name 11/07/22 7767          Pain Scale: FACES Pre/Post-Treatment    Pain: FACES Scale, Pretreatment 0-->no hurt  -MB     Posttreatment Pain Rating 0-->no hurt  -MB     Row Name 11/07/22 7497          Plan of Care Review    Plan of Care Reviewed With patient;daughter  -MB     Progress improving  -MB     Outcome Evaluation PT re-eval completed w/ increased LLE weight-bearing status to heel WBAT in forefoot offloading shoe. Patient demonstrated improved ability to follow commands and participate in therapy. He continues to be limited by weakness, impaired balance, and gait instability. Patient completed bed mobility w/ CGA, transfers w/ mod A x 2 improving to min A x 2, and ambulated 15ft w/ RW and min A  x 2. PT goals updated to reflect current level of function. PT continues to recommend SNF rehab at D/C.  -MB     Row Name 11/07/22 1335          Therapy Assessment/Plan (PT)    Rehab Potential (PT) good, to achieve stated therapy goals  -MB     Criteria for Skilled Interventions Met (PT) yes;meets criteria  -MB     Therapy Frequency (PT) daily  -MB     Row Name 11/07/22 1335          Vital Signs    Pre Systolic BP Rehab 161  -MB     Pre Treatment Diastolic   -MB     Pre Patient Position Supine  -MB     Intra Patient Position Standing  -MB     Post Patient Position Sitting  -MB     Row Name 11/07/22 1335          Positioning and Restraints    Pre-Treatment Position in bed  -MB     Post Treatment Position chair  -MB     In Chair notified nsg;reclined;call light within reach;encouraged to call for assist;exit alarm on;with family/caregiver;on mechanical lift sling;waffle cushion;legs elevated;heels elevated  -MB           User Key  (r) = Recorded By, (t) = Taken By, (c) = Cosigned By    Initials Name Provider Type    Soo Ybarra, PT Physical Therapist               Outcome Measures     Row Name 11/07/22 1347          How much help from another person do you currently need...    Turning from your back to your side while in flat bed without using bedrails? 4  -MB     Moving from lying on back to sitting on the side of a flat bed without bedrails? 3  -MB     Moving to and from a bed to a chair (including a wheelchair)? 2  -MB     Standing up from a chair using your arms (e.g., wheelchair, bedside chair)? 3  -MB     Climbing 3-5 steps with a railing? 2  -MB     To walk in hospital room? 3  -MB     AM-PAC 6 Clicks Score (PT) 17  -MB     Highest level of mobility 5 --> Static standing  -MB     Row Name 11/07/22 1347 11/07/22 1129       Functional Assessment    Outcome Measure Options AM-PAC 6 Clicks Basic Mobility (PT)  -MB AM-PAC 6 Clicks Daily Activity (OT)  -JY          User Key  (r) = Recorded By, (t) =  Taken By, (c) = Cosigned By    Initials Name Provider Type    Soo Ybarra, PT Physical Therapist    Jenniffer De Jesus, OT Occupational Therapist                             Physical Therapy Education     Title: PT OT SLP Therapies (In Progress)     Topic: Physical Therapy (Resolved)     Point: Mobility training (Resolved)     Learning Progress Summary           Patient Acceptance, E, VU by LS at 10/26/2022 2333    Eager, E, VU by SC at 10/26/2022 1543    Comment: reviewed HEp    Acceptance, E, VU by LS at 10/25/2022 2209    Acceptance, E, NR by SC at 10/25/2022 1633    Comment: reviewed benefits of sitting up    Acceptance, E, VU by  at 10/22/2022 2204    Acceptance, E, NL by SC at 10/19/2022 1512    Comment: reviewed benefits of actiity                   Point: Home exercise program (Resolved)     Learning Progress Summary           Patient Acceptance, E, VU by LS at 10/26/2022 2333    Eager, E, VU by SC at 10/26/2022 1543    Comment: reviewed HEp    Acceptance, E, VU by  at 10/25/2022 2209    Acceptance, E, NR by SC at 10/25/2022 1633    Comment: reviewed benefits of sitting up    Acceptance, E, VU by  at 10/22/2022 2204    Acceptance, E, NL by SC at 10/19/2022 1512    Comment: reviewed benefits of actiity                   Point: Body mechanics (Resolved)     Learning Progress Summary           Patient Acceptance, E, VU by  at 10/26/2022 2333    Eager, E, VU by SC at 10/26/2022 1543    Comment: reviewed HEp    Acceptance, E, VU by  at 10/25/2022 2209    Acceptance, E, NR by SC at 10/25/2022 1633    Comment: reviewed benefits of sitting up    Acceptance, E, VU by  at 10/22/2022 2204    Acceptance, E, NL by SC at 10/19/2022 1512    Comment: reviewed benefits of actiity                   Point: Precautions (Resolved)     Learning Progress Summary           Patient Acceptance, E, VU by LS at 10/26/2022 2333    Eager, E, VU by SC at 10/26/2022 1543    Comment: reviewed HEp    Acceptance, E, VU  by  at 10/25/2022 2209    Acceptance, E, NR by SC at 10/25/2022 1633    Comment: reviewed benefits of sitting up    Acceptance, E, VU by  at 10/22/2022 2204    Acceptance, E, NL by SC at 10/19/2022 1512    Comment: reviewed benefits of actiity                               User Key     Initials Effective Dates Name Provider Type Discipline    SC 06/16/21 -  Efra Cristobal PT Physical Therapist PT     09/22/22 -  Augusta Singleton RN Registered Nurse Nurse              PT Recommendation and Plan  Planned Therapy Interventions (PT): balance training, bed mobility training, gait training, home exercise program, postural re-education, patient/family education, strengthening, transfer training  Plan of Care Reviewed With: patient, daughter  Progress: improving  Outcome Evaluation: PT re-eval completed w/ increased LLE weight-bearing status to heel WBAT in forefoot offloading shoe. Patient demonstrated improved ability to follow commands and participate in therapy. He continues to be limited by weakness, impaired balance, and gait instability. Patient completed bed mobility w/ CGA, transfers w/ mod A x 2 improving to min A x 2, and ambulated 15ft w/ RW and min A x 2. PT goals updated to reflect current level of function. PT continues to recommend SNF rehab at D/C.     Time Calculation:    PT Charges     Row Name 11/07/22 1349 11/07/22 1347          Time Calculation    Start Time 0922  -MB 0922  -MB     PT Received On 11/07/22  -MB 11/07/22  -MB     PT Goal Re-Cert Due Date 11/17/22  -MB 11/17/22  -MB        Time Calculation- PT    Total Timed Code Minutes- PT 25 minute(s)  -MB 15 minute(s)  -MB        Timed Charges    71398 - PT Therapeutic Exercise Minutes 15  -MB --     27991 - Gait Training Minutes  10  -MB --        Untimed Charges    PT Eval/Re-eval Minutes -- 30  -MB        Total Minutes    Timed Charges Total Minutes 25  -MB --     Untimed Charges Total Minutes -- 30  -MB      Total Minutes 25  -MB 30  -MB            User Key  (r) = Recorded By, (t) = Taken By, (c) = Cosigned By    Initials Name Provider Type    Soo Ybarra, PT Physical Therapist              Therapy Charges for Today     Code Description Service Date Service Provider Modifiers Qty    22910634783 HC PT RE-EVAL ESTABLISHED PLAN 2 2022 Soo [address], PT GP 1    10253879288 HC PT THER PROC EA 15 MIN 2022 Soo [address], PT GP 1    49668175947 HC GAIT TRAINING EA 15 MIN 2022 Soo Lane, PT GP 1          PT G-Codes  Outcome Measure Options: AM-PAC 6 Clicks Basic Mobility (PT)  AM-PAC 6 Clicks Score (PT): 17  AM-PAC 6 Clicks Score (OT): 12    Soo Lane PT  2022      Electronically signed by Soo Lane, PT at 22 1351          Occupational Therapy Notes (last 24 hours)      Jenniffer Lee, OT at 22 0946          Patient Name: Omkar Nava  : 1955    MRN: [id number]                              Today's Date: 2022       Admit Date: 10/14/2022    Visit Dx:     ICD-10-CM ICD-9-CM   1. Osteomyelitis of toe of left foot (HCC)  M86.9 730.27   2. Anemia, unspecified type  D64.9 285.9   3. Hyperglycemia  R73.9 790.29   4. 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, without long-term current use of insulin   • Orthostatic hypotension   • Acute osteomyelitis of left foot (HCC)   • Type 2 diabetes mellitus (HCC)   • Essential hypertension   • Psychotic disorder (HCC)   • Neurocognitive disorder     Past Medical History:   Diagnosis Date   • Diabetes mellitus (HCC)    • GERD (gastroesophageal reflux disease)      Past Surgical History:   Procedure Laterality Date   • AMPUTATION FOOT Left 10/18/2022    Procedure: PARTIAL FIRST RAY AMPUTATION LEFT;  Surgeon: Jovanny  MD Yeison;  Location: [facility];  Service: Orthopedics;  Laterality: Left;   • EYE SURGERY        General Information     Row Name 11/07/22 1039          OT Time and Intention    Document Type re-evaluation  -JY     Mode of Treatment occupational therapy  -JY     Row Name 11/07/22 1039          General Information    Patient Profile Reviewed yes  -JY     Prior Level of Function --  refer to OT IE  -JY     Existing Precautions/Restrictions fall;left;weight bearing;other (see comments)  per ortho 11/7/22: pt heel WBAT while in forefoot offloading shoe at [facility], baseline vision deficits, dementia  -JY     Barriers to Rehab visual deficit;cognitive status  -JY     Row Name 11/07/22 1039          Occupational Profile    Environmental Supports and Barriers (Occupational Profile) refer to OT IE for specifics  -JY     Row Name 11/07/22 1039          Living Environment    People in Home facility resident;other (see comments)  per chart review, refer to OT IE  -JY     Row Name 11/07/22 1039          Home Main Entrance    Number of Stairs, Main Entrance none  -JY     Stair Railings, Main Entrance none  -JY     Row Name 11/07/22 1039          Stairs Within Home, Primary    Number of Stairs, Within Home, Primary none  -JY     Stair Railings, Within Home, Primary none  -JY     Row Name 11/07/22 1039          Cognition    Orientation Status (Cognition) oriented to;person;place;disoriented to;time  -JY     Row Name 11/07/22 1039          Safety Issues, Functional Mobility    Safety Issues Affecting Function (Mobility) awareness of need for assistance;insight into deficits/self-awareness;safety precaution awareness;safety precautions follow-through/compliance;sequencing abilities;positioning of assistive device  -JY     Impairments Affecting Function (Mobility) balance;cognition;coordination;endurance/activity tolerance;motor control;postural/trunk control;strength;visual/perceptual  -JY     Cognitive Impairments, Mobility  Safety/Performance insight into deficits/self-awareness;problem-solving/reasoning;safety precaution awareness;safety precaution follow-through;sequencing abilities  -JY     Comment, Safety Issues/Impairments (Mobility) pt more alert and participatory in progressive mobility this date, ortho changed weight bearing status at LLE prior to OT session to heel WBAT in forefoot offloading shoe; pt denied any pain and tolerated weight bearing LLE with cues for more optimal midline orientation with decreased weight bearing at R side; improved upright posture with cues and stood more easily with BUE support and said cues  -JY           User Key  (r) = Recorded By, (t) = Taken By, (c) = Cosigned By    Initials Name Provider Type    Jenniffer De Jesus OT Occupational Therapist                 Mobility/ADL's     Row Name 11/07/22 1046          Bed Mobility    Bed Mobility supine-sit;scooting/bridging  -JY     Scooting/Bridging [place] (Bed Mobility) contact guard;1 person assist;verbal cues  -JY     Supine-Sit [place] (Bed Mobility) contact guard;1 person assist;verbal cues  -JY     Assistive Device (Bed Mobility) head of bed elevated;bed rails  -JY     Comment, (Bed Mobility) gross CGA for supine > sitting and scooting to EOB with HOB elevated and bed rails available for use, cues for advancing LEs to EOB and uprighting trunk into sitting with increased time to locate tactile landmarks for support in movement d/t vision deficits  -JY     Row Name 11/07/22 1046          Transfers    Transfers sit-stand transfer;stand-sit transfer  -JY     Comment, (Transfers) skilled cues for optimal hand placement for controlled ascend and descend specifically to push up from seated surface Rosio and then transition to FWW support and to reach back prior to sitting with resumption of B hands on seated surface, postural cues for upright standing once upright and weight shift A to come to midline vs incresaed R lateral lean or posterior  lean; pt will fore foot offloading shoe to LLE as indicated and another shoe donned to R foot for more symmetry in standing  [facility] 11/[facility]22 1046          Sit-Stand Transfer    Sit-Stand [place] (Transfers) minimum assist (75% patient effort);2 person assist;1 person to manage equipment;verbal cues  -SANDRA     Row Name 11/07/22 1046          Stand-Sit Transfer    Stand-Sit [place] (Transfers) minimum assist (75% patient effort);2 person assist;1 person to manage equipment;verbal cues  -JY     Assistive Device (Stand-Sit Transfers) walker, front-wheeled  -SARAH     Row Name 11/07/22 1046          Functional Mobility    Functional Mobility- Comment defer to PT for specifics; pt demonstrated improved alertness and engagement toward progressive mobility; ortho changed weight bearing status prior to OT arrival as indicated in precautions  -[facility] Name 11/07/22 1046          Activities of Daily Living    BADL Assessment/Intervention upper body dressing;grooming;lower body dressing;toileting  -[facility] Name 11/07/22 [facility]6          Mobility    Extremity Weight-bearing Status left lower extremity  -J     Left Lower Extremity (Weight-bearing Status) weight-bearing as tolerated (WBAT);other (see comments)   per ortho as of 11/7/22 heel WBAT in forfront offloading shoe  -[facility] Name 11/07/22 1046          Upper Body Dressing Assessment/Training    [place] Level (Upper Body Dressing) doff;don;pajama/robe;minimum assist (75% patient effort);moderate assist (50% patient effort);verbal cues  -JSARAH     Position (Upper Body Dressing) supported sitting;edge of bed sitting  -J     Comment, (Upper Body Dressing) min to mod A for posterior and proximal mgmt of gown when donned like jacket, cues for optimal seq and safety around IV  -     Row Name 11/07/22 1046          Lower Body Dressing Assessment/Training    [place] Level (Lower Body Dressing) doff;don;socks;dependent (less than 25% patient effort)   "-JY     Position (Lower Body Dressing) supine  -JY     Comment, (Lower Body Dressing) dep for socks and offloading shoe to L foot. later for 2nd shoe at R foot for improved symmetry when standing  -JY     Row Name 11/07/22 1046          Grooming Assessment/Training    [place] Level (Grooming) wash face, hands;supervision;set up  -JY     Position (Grooming) supported sitting  -JY     Row Name 11/07/22 1046          Toileting Assessment/Training    [place] Level (Toileting) adjust/manage clothing;maximum assist (25% patient effort);perform perineal hygiene;dependent (less than 25% patient effort);verbal cues  -JY     Assistive Devices (Toileting) urinal  -JY     Position (Toileting) supported standing  -JY     Comment, (Toileting) pt verbalized urgent need to complete voiding while standing thus urinal provided; pt req'd max A for gown mgmt to access area for placement of urinal, dependent for holding of urinal while pt stood with unilateral and later recommended bilateral UE support; pt presented with lateral lean when not grasping FWW with B hands and req'd greater postural cues for stability  -JY           User Key  (r) = Recorded By, (t) = Taken By, (c) = Cosigned By    Initials Name Provider Type    Jenniffer De Jesus OT Occupational Therapist               Obj/Interventions     Row Name 11/07/22 1109          Vision Assessment/Intervention    Visual Impairment/Limitations visual/perceptual impairments present;other (see comments)  pt presents with visual defiicts at baseline, reportedly only seeing \"shadows\" however during assessment indicated seeing colors of objects  -JSARAH     Row Name 11/07/22 1109          Range of Motion Comprehensive    General Range of Motion bilateral upper extremity ROM WFL  -J     Row Name 11/07/22 1109          Strength Comprehensive (MMT)    General Manual Muscle Testing (MMT) Assessment upper extremity strength deficits identified  -J     Comment, General Manual Muscle " Testing (MMT) [facility] based on components of MMT and observation grossly 4/5  -JY     Row Name 11/07/22 1109          Hand (Therapeutic Exercise)    Hand (Therapeutic Exercise) strengthening exercise  -JY     Hand Strengthening (Therapeutic Exercise) bilateral;finger flexion;finger extension; strengthening;thumb opposition;thumb flexion;thumb extension;intrinsic strengthening;other (see comments);5 repetitions  blue foam block  -JY     Row Name 11/07/22 1109          Motor Skills    Motor Skills functional endurance  -JY     Functional Endurance decreased activity tolerance toward more dynamic tasks with fatigue noted after fxl standing and ADLs; pt less lethargic this date  -JY     Therapeutic Exercise hand;other (see comments)  initiated training with pt and family on hand, digit exercises w/ functional prehension patterns noted w/ blue sponge resistance; 5 reps for competency check  -JY     Row Name 11/07/22 1109          Balance    Balance Assessment sitting static balance;sitting dynamic balance;standing static balance;standing dynamic balance  -JY     Static Sitting Balance contact guard;verbal cues  -JY     Dynamic Sitting Balance minimal assist;verbal cues;other (see comments)  lateral and posterior lean w/ more dynamic tasks w/ UE reach away from ANGELICA  -JY     Position, Sitting Balance supported;sitting edge of bed  -JY     Static Standing Balance minimal assist;2-person assist;1 person to manage equipment;verbal cues  -JY     Dynamic Standing Balance moderate assist;2-person assist;verbal cues  -JY     Position/Device Used, Standing Balance supported;walker, front-wheeled  -JY     Balance Interventions sitting;standing;static;dynamic;sit to stand;supported;occupation based/functional task  -JY     Comment, Balance improved standing posture with cues for retracted shoulders and decreased hip flexion; presented with lateral lean to R side in standing (improved with cues) and posterior  lean when stepping backward toward chair  -JY           User Key  (r) = Recorded By, (t) = Taken By, (c) = Cosigned By    Initials Name Provider Type    Jenniffer De Jesus, SHIRAZ Occupational Therapist               Goals/Plan     Row Name 11/07/22 1126          Bed Mobility Goal 1 (OT)    Activity/Assistive Device (Bed Mobility Goal 1, OT) rolling to left;rolling to right;sit to supine;supine to sit  -JY     [place] Level/Cues Needed (Bed Mobility Goal 1, OT) standby assist;verbal cues required  -JY     Time Frame (Bed Mobility Goal 1, OT) long term goal (LTG);by discharge  -JY     Progress/Outcomes (Bed Mobility Goal 1, OT) good progress toward goal;goal revised this date;goal ongoing  -JY     Row Name 11/07/22 1126          Dressing Goal 1 (OT)    Activity/Device (Dressing Goal 1, OT) upper body dressing;lower body dressing  -JY     [place]/Cues Needed (Dressing Goal 1, OT) minimum assist (75% or more patient effort);maximum assist (25-49% patient effort);verbal cues required  UBD with min A, max A for LBD  -JY     Time Frame (Dressing Goal 1, OT) long term goal (LTG);by discharge  -JY     Progress/Outcome (Dressing Goal 1, OT) goal ongoing  -JY     Row Name 11/07/22 1126          Grooming Goal 1 (OT)    Activity/Device (Grooming Goal 1, OT) hair care;wash face, hands;oral care  -JY     [place] (Grooming Goal 1, OT) contact guard required;verbal cues required  -JY     Time Frame (Grooming Goal 1, OT) long term goal (LTG);by discharge  -JY     Progress/Outcome (Grooming Goal 1, OT) good progress toward goal;goal revised this date;goal ongoing  -JY     Row Name 11/07/22 1126          Strength Goal 1 (OT)    Strength Goal 1 (OT) Pt to complete seated HEP consistent with pt's physical and cognitive needs encompassing BUEs focused on strength and endurance w/ progressive sets/reps/resistance in order to progress integration in ADLs, related t/fs  -JY     Time Frame (Strength Goal 1, OT) long term goal  (LTG);by discharge  -JY     Progress/Outcome (Strength Goal 1, OT) goal ongoing  -JY     Row Name 11/07/22 1126          Therapy Assessment/Plan (OT)    Planned Therapy Interventions (OT) activity tolerance training;adaptive equipment training;BADL retraining;functional balance retraining;occupation/activity based interventions;patient/caregiver education/training;ROM/therapeutic exercise;strengthening exercise;transfer/mobility retraining  -JY           User Key  (r) = Recorded By, (t) = Taken By, (c) = Cosigned By    Initials Name Provider Type    Jenniffer De Jesus, SHIRAZ Occupational Therapist               Clinical Impression     Row Name 11/07/22 1114          Pain Assessment    Pretreatment Pain Rating 0/10 - no pain  -JY     Posttreatment Pain Rating 0/10 - no pain  -JY     Pre/Posttreatment Pain Comment denies any pain and tolerated all OT interventions  -JY     Pain Intervention(s) Repositioned;Ambulation/increased activity  -JSARAH     Row Name 11/07/22 1114          Plan of Care Review    Plan of Care Reviewed With patient;daughter  -JY     Progress improving  -JY     Outcome Evaluation OT re-eval completed this date s/p upgraded change in pt weight bearing status per ortho to heel WBAT in forfoot offloading shoe. Pt more attentive, oriented and engaged in OT session this date yet still presents w/ decreased functional endurance, impaired balance, muscle weakness at BUEs, safety awareness deficits and fatigue with more dynamic tasks. Pt completed STS at recliner with min A x 2 + 1 for equipment mgmt w/ FWW support with cues for posture, weight shiftng, safety and hand placement with improvement noted in this stand compared to earlier from EOB set up. Pt completed d/d gown w/ min to mod A, dep care for d/d socks and offloading shoe, spv and set up A for hand and face washing, max to dep care for toileting w/ urinal use however from upgraded standing position. Pt presented w/ R lateral lean when standing and  posterior lean when stepping backward however improved with cues and the sustained FWW support. Introduced pt to hand exercises for strengthening and joint mobilty after observed diff with grasp of fxl items and known weakness. Provided pt w/ blue foam block and supplemental HEP and educated pt and family present. Pt will need further training for TE. Graded goals as necessary, continue to recommend SNF at d/c when medically ready.  -[facility] Name 11/07/22 1114          Therapy Assessment/Plan (OT)    Patient/Family Therapy Goal Statement (OT) to maximize I in ADLs, related t/fs, return to PLOF  -JY     Rehab Potential (OT) good, to achieve stated therapy goals  -JY     Criteria for Skilled Therapeutic Interventions Met (OT) yes;skilled treatment is necessary  -JY     Therapy Frequency (OT) daily  -     Row Name 11/07/22 1114          Therapy Plan Review/Discharge Plan (OT)    Anticipated Discharge Disposition (OT) [facility]  -     Row Name 11/07/22 1114          Vital Signs    Pre Systolic BP Rehab 145  -JY     Pre Treatment Diastolic BP 69  -JY     Pretreatment Heart Rate (beats/min) 75  -JY     Posttreatment Heart Rate (beats/min) 77  -JY     Pre SpO2 (%) 95  -JY     O2 Delivery Pre Treatment room air  -JY     O2 Delivery Intra Treatment room air  -JY     Post SpO2 (%) 95  -JY     O2 Delivery Post Treatment room air  -JY     Pre Patient Position Sitting  -JY     Intra Patient Position Standing  -JY     Post Patient Position Sitting  -JY     Row Name 11/07/22 1114          Positioning and Restraints    Pre-Treatment Position sitting in chair/recliner  -JY     Post Treatment Position chair  -JY     In Chair notified nsg;reclined;call light within reach;encouraged to call for assist;exit alarm on;with family/caregiver;waffle cushion;on mechanical lift sling;legs elevated;heels elevated  -JY           User Key  (r) = Recorded By, (t) = Taken By, (c) = Cosigned By    Initials Name Provider Type     Jenniffer De Jesus OT Occupational Therapist               Outcome Measures     Row Name 11/07/22 1129          How much help from another is currently needed...    Putting on and taking off regular lower body clothing? 1  -JY     Bathing (including washing, rinsing, and drying) 1  -JY     Toileting (which includes using toilet bed pan or urinal) 1  -JY     Putting on and taking off regular upper body clothing 3  -JY     Taking care of personal grooming (such as brushing teeth) 3  -JY     Eating meals 3  -JY     AM-PAC 6 Clicks Score (OT) 12  -JY     Row Name 11/07/22 1129          Functional Assessment    Outcome Measure Options AM-PAC 6 Clicks Daily Activity (OT)  -JY           User Key  (r) = Recorded By, (t) = Taken By, (c) = Cosigned By    Initials Name Provider Type    Jenniffer De Jesus OT Occupational Therapist                Occupational Therapy Education     Title: PT OT SLP Therapies (In Progress)     Topic: Occupational Therapy (In Progress)     Point: ADL training (In Progress)     Description:   Instruct learner(s) on proper safety adaptation and remediation techniques during self care or transfers.   Instruct in proper use of assistive devices.              Learning Progress Summary           Patient Acceptance, E,D, NR by SANDRA at 11/7/2022 0946    Acceptance, E,D, NR by SANDRA at 11/2/2022 1505    Acceptance, E, VU by PEG at 10/26/2022 2333    Acceptance, E, VU by PEG at 10/25/2022 2209    Acceptance, E,D, NR by SANDRA at 10/24/2022 1340    Acceptance, E, VU by PEG at 10/22/2022 2204    Acceptance, E,D, NR by SANDRA at 10/19/2022 1445   Family Acceptance, E,D, NR by SANDRA at 11/7/2022 0946                   Point: Home exercise program (In Progress)     Description:   Instruct learner(s) on appropriate technique for monitoring, assisting and/or progressing therapeutic exercises/activities.              Learning Progress Summary           Patient Acceptance, E,D, NR by SANDRA at 11/7/2022 0946    Acceptance, E, VU by PEG at  10/26/2022 2333    Acceptance, E, VU by PEG at 10/25/2022 2209    Acceptance, E, VU by PEG at 10/22/2022 2204   Family Acceptance, E,D, NR by SANDRA at 11/7/2022 0946                   Point: Precautions (In Progress)     Description:   Instruct learner(s) on prescribed precautions during self-care and functional transfers.              Learning Progress Summary           Patient Acceptance, E,D, NR by SANDRA at 11/7/2022 0946    Acceptance, E,D, NR by SANDRA at 11/2/2022 1505    Acceptance, E, VU by PEG at 10/26/2022 2333    Acceptance, E, VU by PEG at 10/25/2022 2209    Acceptance, E,D, NR by SANDRA at 10/24/2022 1340    Acceptance, E, VU by PEG at 10/22/2022 2204    Acceptance, E,D, NR by SANDRA at 10/19/2022 1445   Family Acceptance, E,D, NR by SANDRA at 11/7/2022 0946                   Point: Body mechanics (In Progress)     Description:   Instruct learner(s) on proper positioning and spine alignment during self-care, functional mobility activities and/or exercises.              Learning Progress Summary           Patient Acceptance, E,D, NR by SANDRA at 11/7/2022 0946    Acceptance, E,D, NR by SANDRA at 11/2/2022 1505    Acceptance, E, VU by PEG at 10/26/2022 2333    Acceptance, E, VU by PEG at 10/25/2022 2209    Acceptance, E,D, NR by SANDRA at 10/24/2022 1340    Acceptance, E, VU by PEG at 10/22/2022 2204    Acceptance, E,D, NR by SANDRA at 10/19/2022 1445   Family Acceptance, E,D, NR by SANDRA at 11/7/2022 0946                               User Key     Initials Effective Dates Name Provider Type Discipline    SANDRA 06/16/21 -  Jenniffer Lee OT Occupational Therapist OT     09/22/22 -  Augusta Singleton, RN Registered Nurse Nurse              OT Recommendation and Plan  Planned Therapy Interventions (OT): activity tolerance training, adaptive equipment training, BADL retraining, functional balance retraining, occupation/activity based interventions, patient/caregiver education/training, ROM/therapeutic exercise, strengthening exercise, transfer/mobility  retraining  Therapy Frequency (OT): daily  Plan of Care Review  Plan of Care Reviewed With: patient, daughter  Progress: improving  Outcome Evaluation: OT re-eval completed this date s/p upgraded change in pt weight bearing status per ortho to heel WBAT in forfoot offloading shoe. Pt more attentive, oriented and engaged in OT session this date yet still presents w/ decreased functional endurance, impaired balance, muscle weakness at BUEs, safety awareness deficits and fatigue with more dynamic tasks. Pt completed STS at recliner with min A x 2 + 1 for equipment mgmt w/ FWW support with cues for posture, weight shiftng, safety and hand placement with improvement noted in this stand compared to earlier from EOB set up. Pt completed d/d gown w/ min to mod A, dep care for d/d socks and offloading shoe, spv and set up A for hand and face washing, max to dep care for toileting w/ urinal use however from upgraded standing position. Pt presented w/ R lateral lean when standing and posterior lean when stepping backward however improved with cues and the sustained FWW support. Introduced pt to hand exercises for strengthening and joint mobilty after observed diff with grasp of fxl items and known weakness. Provided pt w/ blue foam block and supplemental HEP and educated pt and family present. Pt will need further training for TE. Graded goals as necessary, continue to recommend SNF at d/c when medically ready.     Time Calculation:    Time Calculation- OT     Row Name 11/07/22 1130             Time Calculation- OT    OT Start Time 0946  -JY      OT Received On 11/07/22  -JY      OT Goal Re-Cert Due Date 11/17/22  -JY         Timed Charges    28986 - OT Therapeutic Exercise Minutes 12  -JY      80245 - OT Therapeutic Activity Minutes 8  -JY      10653 - OT Self Care/Mgmt Minutes 8  -JY         Untimed Charges    OT Eval/Re-eval Minutes 30  -JY         Total Minutes    Timed Charges Total Minutes 28  -JY      Untimed Charges  Total Minutes 30  -JY       Total Minutes 58  -JY            User Key  (r) = Recorded By, (t) = Taken By, (c) = Cosigned By    Initials Name Provider Type    Jenniffer De Jesus OT Occupational Therapist              Therapy Charges for Today     Code Description Service Date Service Provider Modifiers Qty    56169887467  OT THER PROC EA 15 MIN 11/7/2022 Jenniffer Lee OT GO 1    [id number]  OT SELF CARE/MGMT/TRAIN EA 15 MIN 11/7/2022 Jenniffer Lee OT GO 1    [id number]  OT RE-EVAL 2 11/7/2022 Jenniffer Lee OT GO 1               Jenniffer Lee OT  11/7/2022    Electronically signed by Jenniffer Lee OT at 11/07/22 1133     Jenniffer Lee OT at 11/07/22 0946        Goal Outcome Evaluation:  Plan of Care Reviewed With: patient, daughter        Progress: improving  Outcome Evaluation: OT re-eval completed this date s/p upgraded change in pt weight bearing status per ortho to heel WBAT in forfoot offloading shoe. Pt more attentive, oriented and engaged in OT session this date yet still presents w/ decreased functional endurance, impaired balance, muscle weakness at BUEs, safety awareness deficits and fatigue with more dynamic tasks. Pt completed STS at recliner with min A x 2 + 1 for equipment mgmt w/ FWW support with cues for posture, weight shiftng, safety and hand placement with improvement noted in this stand compared to earlier from EOB set up. Pt completed d/d gown w/ min to mod A, dep care for d/d socks and offloading shoe, spv and set up A for hand and face washing, max to dep care for toileting w/ urinal use however from upgraded standing position. Pt presented w/ R lateral lean when standing and posterior lean when stepping backward however improved with cues and the sustained FWW support. Introduced pt to hand exercises for strengthening and joint mobilty after observed diff with grasp of fxl items and known weakness. Provided pt w/ blue foam block and supplemental HEP and educated pt and family  present. Pt will need further training for TE. Graded goals as necessary, continue to recommend SNF at d/c when medically ready.    Electronically signed by Jenniffer Lee OT at 11/07/22 4546

## 2022-11-07 NOTE — PLAN OF CARE
Goal Outcome Evaluation:      Pt. Rested well this shift, splint intact, Up to BSC x 2 this shift with 2 assist, use of gait belt and walker. BM x 2 this shift.

## 2022-11-07 NOTE — PLAN OF CARE
Goal Outcome Evaluation:  Plan of Care Reviewed With: patient, daughter        Progress: improving  Outcome Evaluation: PT re-eval completed w/ increased LLE weight-bearing status to heel WBAT in forefoot offloading shoe. Patient demonstrated improved ability to follow commands and participate in therapy. He continues to be limited by weakness, impaired balance, and gait instability. Patient completed bed mobility w/ CGA, transfers w/ mod A x 2 improving to min A x 2, and ambulated 15ft w/ RW and min A x 2. PT goals updated to reflect current level of function. PT continues to recommend SNF rehab at D/C.

## 2022-11-07 NOTE — DISCHARGE PLACEMENT REQUEST
"Prema Sung (67 y.o. Male)   Oscar Bang, RN Case Manager  [phone]    Date of Birth   1955    Social Security Number       Address   [address] [place] [address]    Home Phone   [phone]    MRN   [id number]       Buddhist   None    Marital Status   Single                            Admission Date   10/14/22    Admission Type   Emergency    Admitting Provider   Wilfredo Lowe MD    Attending Provider   Wilfredo Lowe MD    Department, Room/Bed   [facility] 3G, S363/1       Discharge Date       Discharge Disposition       Discharge Destination                               Attending Provider: Wilfredo Lowe MD    Allergies: No Known Allergies    Isolation: None   Infection: None   Code Status: CPR    Ht: 182.9 cm (72\")   Wt: 87.4 kg (192 lb 9.6 oz)    Admission Cmt: None   Principal Problem: Acute osteomyelitis of left foot (HCC) [M86.172]                 Active Insurance as of 10/14/2022     Primary Coverage     Payor Plan Insurance Group Employer/Plan Group    MEDICARE MEDICARE A & B      Payor Plan Address Payor Plan Phone Number Payor Plan Fax Number Effective Dates    [address] [phone]  8/1/2022 - None Entered    [facility] [id number]       Subscriber Name Subscriber Birth Date Member ID       PREMA SUNG 1955 [id number]           Secondary Coverage     Payor Plan Insurance Group Employer/Plan Group    [facility] COMMUNITY PLAN [facility] COMMUNITY PLAN [facility]     Payor Plan Address Payor Plan Phone Number Payor Plan Fax Number Effective Dates    [address]   9/1/2022 - None Entered    [facility] [id number]       Subscriber Name Subscriber Birth Date Member ID       PREMA SUNG 1955 [id number]                 Emergency Contacts      (Rel.) Home Phone Work Phone Mobile Phone    Idalia Lerma (Daughter) -- -- [phone]    Thang Horton (Sister) [phone] -- --               History & Physical      Scottie Miranda " Chang PARKS, DO at 10/14/22 2209              [facility] Medicine Services  HISTORY AND PHYSICAL    Patient Name: Omkar Nava  : 1955  MRN: [id number]  Primary Care Physician: Provider, No Known  Date of admission: 10/14/2022    Subjective    Subjective     Chief Complaint:  Left foot wound    HPI:  Omkar Nava is a 67 y.o. male past medical history significant for diabetes mellitus type 2, essential hypertension and unspecified psychotic disorder presents to the ED due to worsening left foot wound.  Patient is confused and is a poor historian therefore HPI is limited.  Patient presented to [facility] on 2022 due to mechanical fall causing open articular fracture on the left foot.  He was treated at that time with bedside flushing and repair by podiatry.  He was given Ancef and sent back to nursing facility on Keflex.  Wound cultures at that time grew MRSA however blood culture showed no growth.  On 2022 patient presented back to hospital in [place] due to worsening foot wound and noted drainage.  He underwent left foot debridement and I&D on 2022.  He again underwent left foot debridement and I&D but required no amputation on 2022.  PICC line was placed on 10/3/2022.  Patient was to receive daptomycin however due to expense this was changed to rifampin and linezolid.  PICC line was removed.  At some point patient had transferred from nursing facility in [place] to [place] to be closer to family.  Staff had noticed today that the toe looked worse and appeared necrotic therefore he was sent to the ED for further evaluation.  Patient denies any known fever, chills, nausea, vomiting, shortness of breath or pain.  X-ray of his left foot showed soft tissue swelling of the first digit with an associated fracture involving the medial base of the first proximal phalanx with fracture fragment displaced by 6 mm.  Given soft tissue swelling  appearance is concerning for osteomyelitis with superimposed fracture.  MRI of left foot is pending.  Patient will be admitted to [facility] under the care of the Hospitalist for further evaluation and treatment.         Review of Systems   Constitutional: Negative for appetite change, chills, diaphoresis, fatigue, fever and unexpected weight change.   HENT: Negative.    Eyes: Negative for photophobia and visual disturbance.   Respiratory: Negative for cough and shortness of breath.    Cardiovascular: Negative for chest pain, palpitations and leg swelling.   Gastrointestinal: Negative for abdominal distention, abdominal pain, blood in stool, constipation, diarrhea, nausea and vomiting.   Genitourinary: Negative.    Musculoskeletal: Negative for neck pain and neck stiffness.   Skin: Positive for color change and wound.   Neurological: Negative.    Psychiatric/Behavioral: Negative.         All other systems reviewed and are negative.     Personal History     Past Medical History:   Diagnosis Date   • Diabetes mellitus (HCC)    • GERD (gastroesophageal reflux disease)              Past Surgical History:   Procedure Laterality Date   • EYE SURGERY         Family History:  family history includes Diabetes in his brother, brother, father, maternal grandfather, maternal grandmother, mother, and sister; Hypertension in his brother, brother, father, and mother. Otherwise pertinent FHx was reviewed and unremarkable.     Social History:  reports that he quit smoking about 5 years ago. His smoking use included cigarettes. He has a 40.00 pack-year smoking history. He has never used smokeless tobacco. He reports current drug use. Drug: Marijuana. He reports that he does not drink alcohol.  Social History     Social History Narrative    Caffeine 0-1 servings per day    Patient lives at home .       Medications:  SITagliptin, aspirin, glipiZIDE, lisinopril-hydrochlorothiazide, and metFORMIN    No Known Allergies    Objective    Objective      Vital Signs:   Temp:  [97.8 °F (36.6 °C)] 97.8 °F (36.6 °C)  Heart Rate:  [83-86] 86  Resp:  [16] 16  BP: (142-146)/(74-84) 142/84    Physical Exam  Vitals and nursing note reviewed.   Constitutional:       General: He is not in acute distress.     Appearance: Normal appearance. He is not ill-appearing, toxic-appearing or diaphoretic.   HENT:      Head: Normocephalic and atraumatic.      Nose: Nose normal.      Mouth/Throat:      Mouth: Mucous membranes are dry.      Pharynx: Oropharynx is clear.   Eyes:      Extraocular Movements: Extraocular movements intact.      Conjunctiva/sclera: Conjunctivae normal.      Pupils: Pupils are equal, round, and reactive to light.   Cardiovascular:      Rate and Rhythm: Normal rate and regular rhythm.      Heart sounds: Normal heart sounds.      Comments: Unable to palpate left foot dorsalis pedis pulse.  With doppler is faint   Pulmonary:      Effort: Pulmonary effort is normal.      Breath sounds: Normal breath sounds.   Abdominal:      General: Bowel sounds are normal. There is no distension.      Palpations: Abdomen is soft. There is no mass.      Tenderness: There is no abdominal tenderness. There is no right CVA tenderness, left CVA tenderness, guarding or rebound.      Hernia: No hernia is present.   Musculoskeletal:         General: No swelling, tenderness, deformity or signs of injury. Normal range of motion.      Cervical back: Normal range of motion and neck supple.      Right lower leg: No edema.      Left lower leg: Edema present.   Skin:     Findings: Erythema present.      Comments: Left great toe necrotic with noted sutures .  Mild erythema noted .  Non tender    Neurological:      Mental Status: He is alert. Mental status is at baseline.      Comments: Oriented to person only    Psychiatric:         Mood and Affect: Mood normal.         Behavior: Behavior normal.          Results Reviewed:  I have personally reviewed most recent indicated data and agree with  findings including:  [x]  Laboratory  [x]  Radiology  [x]  EKG/Telemetry  []  Pathology  []  Cardiac/Vascular Studies  []  Old records  []  Other:  Most pertinent findings include:      LAB RESULTS:      Lab 10/14/22  1945   WBC 7.17   HEMOGLOBIN 9.2*   HEMATOCRIT 29.4*   PLATELETS 538*   NEUTROS ABS 4.98   IMMATURE GRANS (ABS) 0.03   LYMPHS ABS 1.46   MONOS ABS 0.47   EOS ABS 0.20   MCV 91.0   SED RATE 67*   CRP 3.07*   PROCALCITONIN 0.05   LACTATE 2.3*   PROTIME 13.3         Lab 10/14/22  1945   SODIUM 139   POTASSIUM 4.9   CHLORIDE 100   CO2 28.0   ANION GAP 11.0   BUN 17   CREATININE 1.21   EGFR 66.4   GLUCOSE 185*   CALCIUM 9.4         Lab 10/14/22  1945   TOTAL PROTEIN 8.4   ALBUMIN 3.60   GLOBULIN 4.8   ALT (SGPT) 17   AST (SGOT) 15   BILIRUBIN <0.2   ALK PHOS 115         Lab 10/14/22  1945   PROTIME 13.3   INR 1.02                 Brief Urine Lab Results  (Last result in the past 365 days)      Color   Clarity   Blood   Leuk Est   Nitrite   Protein   CREAT   Urine HCG        06/12/22 2056 Yellow   Clear     Negative                   Microbiology Results (last 10 days)     ** No results found for the last 240 hours. **          XR Foot 3+ View Left    Result Date: 10/14/2022  DATE OF EXAM: 10/14/2022 8:31 PM  PROCEDURE: XR FOOT 3+ VW LEFT-  INDICATIONS: pain, swelling  COMPARISON: No comparisons available.  TECHNIQUE: A minimum of three routine standard radiographic views were obtained of the left foot.  FINDINGS: There is diffuse soft tissue swelling of the first digit. There is an associated area of osseous erosion with fracture involving the medial aspect of the base of the first proximal phalanx. The fracture fragment is displaced medially by 6 mm. The remaining osseous structures are intact. Bones of the midfoot are maintained in alignment.      Impression: Soft tissue swelling of the first digit with an associated fracture involving the medial base of the first proximal phalanx with fracture fragment  displaced by 6 mm. Given soft tissue swelling appearance concerning for osteomyelitis with superimposed fracture, correlate with associated clinical findings.  This report was finalized on 10/14/2022 9:14 PM by Marco Huitron MD.            Assessment & Plan   Assessment & Plan       Acute osteomyelitis of left foot (HCC)    Type 2 diabetes mellitus (HCC)    Essential hypertension    Psychotic disorder (HCC)      67 year old male presents to the ED due to worsening left foot wound     1) Acute osteomyelitis of left foot  -ortho to see in am   -xray mentioned above  -MRI of left foot pending   -continue cefepime and vancomycin   -BC x2 pending   -wound cultures  -consult ID in am   -type and screen  -check coags     2) Diabetes mellitus type 2  -check hgb A1c   -start ldssi   -fingersticks achs     3) Essential hypertension   -on lisinopril  -BP stable     4) Unspecified psychotic disorder   -per records from [facility] on seroquel and olanzapine  -will verify with family/pharmacy in am if still taking     DVT prophylaxis:  scds to right lower extremity only     CODE STATUS:  Full Code        This note has been completed as part of a split-shared workflow.     Signature: Electronically signed by NARGIS Valdez, 10/14/22, 10:25 PM EDT.      Attending   Admission Attestation       I have performed an independent face-to-face diagnostic evaluation including performing an independent physical examination as documented here.  The documented plan of care above was reviewed and developed with the advanced practice clinician (APC).      Brief Summary Statement:   Omkar Nava is a 67 y.o. male who came to the ED tonight due to worsening left foot wound.  The daughter accompanies the patient in the ED room during my visit today and assist in full with history of the patient is not a reliable historian; she states he has confusion at baseline.  The patient suffered a fracture in mid September and was admitted to  a facility in [place] and eventually had surgery; his sutures reportedly became infected and he was treated with IV antibiotics.  He was brought back to a nursing facility here in [place] for further care and when his dressing was taken down today (Friday) it was felt that his left foot wound had worsened and become more necrotic, so he was sent to the ED.  Daughter states that she feels he has been more confused over the last week.  He denies chest pain, shortness of breath, fever/chills, nausea/emesis, slurred speech/facial droop, or syncope.    Remainder of detailed HPI is as noted by APC and has been reviewed and/or edited by me for completeness.    Attending Physical Exam:  Temp:  [97.8 °F (36.6 °C)-98 °F (36.7 °C)] 98 °F (36.7 °C)  Heart Rate:  [62-86] 62  Resp:  [16-18] 18  BP: (142-169)/(74-84) 169/83    Constitutional: Awake, alert, NAD.  Eyes: PERRLA, sclerae anicteric, no conjunctival injection  HENT: NCAT, mucous membranes dry.  Neck: Supple, no thyromegaly, no lymphadenopathy, trachea midline  Respiratory: Clear to auscultation bilaterally, nonlabored respirations   Cardiovascular: RRR, no murmurs, rubs, or gallops, very faint left dorsalis pedis pulse  Gastrointestinal: Positive bowel sounds, soft, nontender, nondistended  Musculoskeletal: No RLE edema, left foot with inflammation secondary to wound, no clubbing or cyanosis to extremities  Psychiatric: Appropriate affect, cooperative  Neurologic: Oriented to person only, daughter states this is baseline and is chronic/unchanged, Cranial Nerves grossly intact to confrontation, speech quiet but clear  Skin: No rashes, poor turgor.  Left foot has large necrotic area on the medial and plantar aspect of the left toe and medially into the left foot.  Mild surrounding cellulitic change.  No pain on palpation.  No drainage.    Brief Assessment/Plan :  See detailed assessment and plan developed with APC which I have reviewed and/or edited for  completeness.        Admission Status: I believe that this patient meets inpatient criteria due to need for IV antibiotics, and will need consultation with the infectious disease service and orthopedic surgery.  For these reasons I feel his care will extend over 2 midnights.      Scottie Miranda,III, DO  10/15/22                        Electronically signed by Scottie Miranda III, DO at 10/15/22 0305       Vital Signs (last day)     Date/Time Temp Temp src Pulse Resp BP Patient Position SpO2    11/07/22 0834 -- -- 77 -- 145/69 -- --    11/07/22 0720 97.7 (36.5) Oral 79 16 161/106 Lying --    11/07/22 0317 97.6 (36.4) Oral 72 16 143/65 Lying 94    11/06/22 1922 98.9 (37.2) Oral 76 16 142/64 Lying --    11/06/22 1500 98.2 (36.8) Oral 86 18 157/88 Lying 98    11/06/22 0744 98 (36.7) Oral 78 18 137/64 Lying 98    11/06/22 0358 97.7 (36.5) Axillary 66 18 137/64 Lying --            Current Facility-Administered Medications   Medication Dose Route Frequency Provider Last Rate Last Admin   • acetaminophen (TYLENOL) tablet 650 mg  650 mg Oral Q4H PRN Yeison Petty MD   650 mg at 11/05/22 1448    Or   • acetaminophen (TYLENOL) suppository 650 mg  650 mg Rectal Q4H PRN Yeison Petty MD       • bisacodyl (DULCOLAX) suppository 10 mg  10 mg Rectal Daily PRN Yeison Petty MD       • cefTRIAXone (ROCEPHIN) 2 g/100 mL 0.9% NS IVPB (MBP)  2 g Intravenous Q24H Hero Ty [facility] 200 mL/hr at 10/28/22 1109 2 g at 11/06/22 1112   • dextrose (D50W) (25 g/50 mL) IV injection 25 g  25 g Intravenous Q15 Min PRN Yeison Petty MD       • dextrose (GLUTOSE) oral gel 15 g  15 g Oral Q15 Min PRN Yeison Petty MD       • ferrous sulfate tablet 325 mg  325 mg Oral Daily With Breakfast Yeison Petty MD   325 mg at 11/07/22 0835   • glipizide (GLUCOTROL) tablet 2.5 mg  2.5 mg Oral BID AC Elena Ugalde PA-C       • glucagon (human recombinant) (GLUCAGEN DIAGNOSTIC) injection 1 mg  1 mg Intramuscular Q15 Min PRN Jovanny  MD Yeison       • heparin (porcine) 5000 UNIT/ML injection 5,000 Units  5,000 Units Subcutaneous Q8H Yeison Petty MD   5,000 Units at 11/07/22 0512   • lisinopril (PRINIVIL,ZESTRIL) tablet 40 mg  40 mg Oral Daily Shahbaz Kuhn MD   40 mg at 11/07/22 0834   • magnesium sulfate 4 gram infusion - Mg less than or equal to 1mg/dL  4 g Intravenous PRN Yeison Petty MD        Or   • magnesium sulfate 3 gram infusion (1gm x 3) - Mg 1.1 - 1.5 mg/dL  1 g Intravenous PRN Yeison Petty MD        Or   • Magnesium Sulfate 2 gram infusion- Mg 1.6 - 1.9 mg/dL  2 g Intravenous PRN Yeison Petty MD       • melatonin tablet 5 mg  5 mg Oral Nightly PRN Yeison Petty MD   5 mg at 11/05/22 2045   • metFORMIN (GLUCOPHAGE) tablet 1,000 mg  1,000 mg Oral BID With Meals Elena Ugalde PA-C   1,000 mg at 11/07/22 0834   • naloxone (NARCAN) injection 0.1 mg  0.1 mg Intravenous Q5 Min PRN Yeison Petty MD       • NIFEdipine XL (PROCARDIA XL) 24 hr tablet 30 mg  30 mg Oral Q24H Elena Ugalde PA-C   30 mg at 11/07/22 0842   • OLANZapine (zyPREXA) tablet 5 mg  5 mg Oral Daily Nicole Henriquez DO   5 mg at 11/07/22 0833    And   • OLANZapine (zyPREXA) tablet 10 mg  10 mg Oral Nightly Nicole Henriquez DO   10 mg at 11/06/22 2212   • polyethylene glycol (MIRALAX) packet 17 g  17 g Oral PRN Yeison Petty MD   17 g at 11/01/22 0825   • sennosides-docusate (PERICOLACE) 8.6-50 MG per tablet 2 tablet  2 tablet Oral BID Elena Ugalde PA-C   2 tablet at 11/07/22 0834   • sodium chloride 0.9 % flush 10 mL  10 mL Intravenous PRN Yeison Petty MD   10 mL at 11/07/22 0435   • sodium chloride 0.9 % flush 10 mL  10 mL Intravenous Q12H Nicole Henriquez DO   10 mL at 11/07/22 0837   • vancomycin (dosing per levels)   Does not apply Daily Hero Ty [facility]       • vancomycin in dextrose 5% 150 mL (VANCOCIN) IVPB 750 mg  750 mg Intravenous Q12H Hero Ty [facility]            Physician Progress Notes (last 24 hours)      [place]  Elena BALES PA-C at 22 0842              [facility] Medicine Services  PROGRESS NOTE    Patient Name: Omkar Nava  : 1955  MRN: [id number]    Date of Admission: 10/14/2022  Primary Care Physician: Provider, No Known    Subjective     CC: L foot osteomyelitis     HPI:  Sitting in bed, no family at bedside assisting. Finishing breakfast. Says he slept well last night. No issues per nursing staff. BP has been elevated. Reasonable glucose control on Metformin. He is wondering when his surgeon will reassess his foot and when he can begin walking on his heel.     ROS:  Gen- No fevers, chills  CV- No chest pain, palpitations  Resp- No cough, dyspnea  GI- No N/V/D, abd pain    Objective     Vital Signs:   Temp:  [97.6 °F (36.4 °C)-98.9 °F (37.2 °C)] 97.7 °F (36.5 °C)  Heart Rate:  [72-86] 77  Resp:  [16-18] 16  BP: (142-161)/() 145/69     Physical Exam:  Constitutional: No acute distress, awake, alert and conversant. Sitting up in bed   HENT: NCAT, mucous membranes moist  Respiratory: Clear to auscultation bilaterally, respiratory effort normal on room air  Cardiovascular: RRR, no murmurs, rubs, or gallops  Gastrointestinal: Positive bowel sounds, soft, nontender, nondistended  Musculoskeletal: L foot in splint - able to wiggle toes.   Psychiatric: Appropriate affect, cooperative  Neurologic: Moves all extremities spontaneously without focal deficits. Speech clear and coherent     Results Reviewed:  LAB RESULTS:      Lab 22  1111 22  0844   WBC 5.79 6.07   HEMOGLOBIN 7.9* 7.7*   HEMATOCRIT 26.4* 25.4*   PLATELETS 388 296   MCV 91.7 91.7         Lab 22  0417 22  0844   SODIUM 141 135*   POTASSIUM 4.4 4.1   CHLORIDE 105 101   CO2 28.0 24.0   ANION GAP 8.0 10.0   BUN 19 22   CREATININE 0.92 1.00   EGFR 92.3 83.5   GLUCOSE 105* 174*   CALCIUM 9.2 9.0         Brief Urine Lab Results  (Last result in the past 365 days)      Color   Clarity   Blood   Leuk Est    Nitrite   Protein   CREAT   Urine HCG        06/12/22 2056 Yellow   Clear     Negative                   Microbiology Results Abnormal     Procedure Component Value - Date/Time    Fungus Culture - Tissue, Toe, Left [[id number]] Collected: 10/18/22 1608    Lab Status: Preliminary result Specimen: Tissue from Toe, Left Updated: 11/01/22 1815     Fungus Culture No fungus isolated at 2 weeks    AFB Culture - Tissue, Toe, Left [[id number]] Collected: 10/18/22 1608    Lab Status: Preliminary result Specimen: Tissue from Toe, Left Updated: 11/01/22 1815     AFB Culture No AFB isolated at 2 weeks     AFB Stain No acid fast bacilli seen on concentrated smear    Fungus Culture - Tissue, Toe, Left [[id number]] Collected: 10/18/22 1552    Lab Status: Preliminary result Specimen: Tissue from Toe, Left Updated: 11/01/22 1801     Fungus Culture No fungus isolated at 2 weeks    AFB Culture - Tissue, Toe, Left [[id number]] Collected: 10/18/22 1552    Lab Status: Preliminary result Specimen: Tissue from Toe, Left Updated: 11/01/22 1801     AFB Culture No AFB isolated at 2 weeks     AFB Stain No acid fast bacilli seen on concentrated smear    COVID PRE-OP / PRE-PROCEDURE SCREENING ORDER (NO ISOLATION) - Swab, Nasopharynx [[id number]]  (Normal) Collected: 10/27/22 0510    Lab Status: Final result Specimen: Swab from Nasopharynx Updated: 10/27/22 0530    Narrative:      The following orders were created for panel order COVID PRE-OP / PRE-PROCEDURE SCREENING ORDER (NO ISOLATION) - Swab, Nasopharynx.  Procedure                               Abnormality         Status                     ---------                               -----------         ------                     COVID-19, ABBOTT IN-HOUS...[[id number]]  Normal              Final result                 Please view results for these tests on the individual orders.    COVID-19, ABBOTT IN-HOUSE,NASAL Swab (NO TRANSPORT MEDIA) 2 HR TAT - Swab, Nasopharynx [[id number]]  (Normal) Collected:  10/27/22 0510    Lab Status: Final result Specimen: Swab from Nasopharynx Updated: 10/27/22 0530     COVID19 Presumptive Negative    Narrative:      Fact sheet for providers: https://www.fda.gov/media/561312/download     Fact sheet for patients: https://www.fda.gov/media/822384/download    Test performed by PCR.  If inconsistent with clinical signs and symptoms patient should be tested with different authorized molecular test.    Anaerobic Culture - Tissue, Toe, Left [[id number]]  (Normal) Collected: 10/18/22 1608    Lab Status: Final result Specimen: Tissue from Toe, Left Updated: 10/23/22 0543     Anaerobic Culture No anaerobes isolated at 5 days    Anaerobic Culture - Tissue, Toe, Left [[id number]]  (Normal) Collected: 10/18/22 1552    Lab Status: Final result Specimen: Tissue from Toe, Left Updated: 10/23/22 0543     Anaerobic Culture No anaerobes isolated at 5 days    Tissue / Bone Culture - Tissue, Toe, Left [[id number]] Collected: 10/18/22 1552    Lab Status: Final result Specimen: Tissue from Toe, Left Updated: 10/21/22 0840     Tissue Culture No growth at 3 days     Gram Stain Moderate (3+) WBCs seen      No organisms seen    Blood Culture - Blood, Hand, Left [[id number]]  (Normal) Collected: 10/14/22 2115    Lab Status: Final result Specimen: Blood from Hand, Left Updated: 10/19/22 2215     Blood Culture No growth at 5 days    Blood Culture - Blood, Arm, Left [[id number]]  (Normal) Collected: 10/14/22 2115    Lab Status: Final result Specimen: Blood from Arm, Left Updated: 10/19/22 2215     Blood Culture No growth at 5 days    MRSA Screen, PCR (Inpatient) - Swab, Nares [[id number]]  (Normal) Collected: 10/14/22 2345    Lab Status: Final result Specimen: Swab from Nares Updated: 10/15/22 0741     MRSA PCR Negative    Narrative:      The negative predictive value of this diagnostic test is high and should only be used to consider de-escalating anti-MRSA therapy. A positive result may indicate colonization with  MRSA and must be correlated clinically.  MRSA Negative        No radiology results from the last 24 hrs    I have reviewed the medications:  Scheduled Meds:cefTRIAXone, 2 g, Intravenous, Q24H  ferrous sulfate, 325 mg, Oral, Daily With Breakfast  glipizide, 2.5 mg, Oral, BID AC  heparin (porcine), 5,000 Units, Subcutaneous, Q8H  lisinopril, 40 mg, Oral, Daily  metFORMIN, 1,000 mg, Oral, BID With Meals  NIFEdipine XL, 30 mg, Oral, Q24H  OLANZapine, 5 mg, Oral, Daily   And  OLANZapine, 10 mg, Oral, Nightly  senna-docusate sodium, 2 tablet, Oral, BID  sodium chloride, 10 mL, Intravenous, Q12H  vancomycin (dosing per levels), , Does not apply, Daily  vancomycin, 750 mg, Intravenous, Q12H      Continuous Infusions:   PRN Meds:.•  acetaminophen **OR** acetaminophen  •  bisacodyl  •  dextrose  •  dextrose  •  glucagon (human recombinant)  •  magnesium sulfate **OR** magnesium sulfate in D5W 1g/100mL (PREMIX) **OR** magnesium sulfate  •  melatonin  •  [] HYDROmorphone **AND** naloxone  •  polyethylene glycol  •  Insert peripheral IV **AND** sodium chloride    Assessment & Plan     Active Hospital Problems    Diagnosis  POA   • **Acute osteomyelitis of left foot (HCC) [M86.172]  Yes   • Neurocognitive disorder [R41.9]  Unknown   • Type 2 diabetes mellitus (HCC) [E11.9]  Yes   • Essential hypertension [I10]  Yes   • Psychotic disorder (HCC) [F29]  Yes      Resolved Hospital Problems   No resolved problems to display.     Brief Hospital Course to date:  Omkar Nava is a 67 y.o. male with PMH significant for HTN, DMII and unspecified psychotic disorder. He previously worked as a  but has been unable to work for past 2-3 years and has become homeless.     His health declined in 2022 with an acute psychotic event with subsequent psychiatric hospitalization at [facility] in [facility] from  to 2022. He was subsequently transferred to a nursing facility in [place]. On 22,  daughter visited him at the nursing home and he had a significant injury to his L great toe, causing an open articular fracture. He was admitted to [facility] and treated at that time with bedside flushing and repair by podiatry. He was given Ancef and sent back to nursing facility on Keflex. Wound cultures at that time grew MRSA, however blood culture showed no growth. On 9/27/2022, he presented to [facility] in [place] due to worsening foot wound and drainage. He underwent L foot I&D and debridement on 9/30/22. A PICC was placed on 10/3/22. He was to receive Daptomycin but due to expense, this was changed to Rifampin and Linezolid and PICC was removed. He was discharged to [facility] in [facility] on 10/6/22 and at some point, was transferred to [place], in order to be closer to family. Daughter visited him on 10/14/22 and became concerned about the appearance of his toe. She brought him to [facility] ED for further evaluation. ID and orthopedic surgery follow.     Osteomyelitis of left foot / L first toe (s/p amputation)  Wound culture (+) Raoultella ornithinolytica   - 10/15/22 MRI of L foot showed wound around the great toe with geographic marrow signal alteration within the distal aspect of the first metatarsal as well as both phalanges of the great toe indicative of osteomyelitis  - He is s/p L great toe amputation on 10/18/22 by Dr. Petty  - NWB to LLE. Elevated LLE. Keep splint clean and dry. SQ heparin for DVT PPX while inpatient. Discharge on ASA 325mg daily x 30 days  - I notified Dr. Petty of patient's continued hospitalization. He will re-evaluate patient today and anticipates removing splint and allowing patient to begin heel-weight bearing on LLE today   - ID following. PICC placed 10/27/22. Planning for IV Vancomycin / Rocephin through 11/25/22. Follow up with Dr. Gordon in 2 weeks.     Iron-deficiency anemia   - Completed 3 days IV iron on 10/20/22  - Hgb  stable. Monitor periodically     Non-insulin dependent DMII  - Hgb A1c 7.0%   - Continue Metformin 1000mg BID, add Glipizide 2.5mg BID   - Continue TID accuchecks     Hypertension  - Stop Amlodipine. Start Nifedipine XL 30mg daily, continue Lisinopril 40mg PO daily     Neurocognitive disorder  Dementia   - Re-started olanzapine 10/15/22, was recently hospitalized at [facility] for psychiatric issues; apparently PRN Haldol was not much help, have discontinued  - Continue Olanzapine 5mg QAM and 10mg QHS  - Overall stable    Expected Discharge Location and Transportation: rehab with goal to transition to LTC. Private vehicle vs WC van   Expected Discharge Date: 22    DVT prophylaxis:Medical and mechanical DVT prophylaxis orders are present.     AM-PAC 6 Clicks Score (PT): 18 (22 0800)    CODE STATUS:   Code Status and Medical Interventions:   Ordered at: 10/14/22 2230     Level Of Support Discussed With:    Patient     Code Status (Patient has no pulse and is not breathing):    CPR (Attempt to Resuscitate)     Medical Interventions (Patient has pulse or is breathing):    Full Support     Elena Ugalde PA-C  22                Electronically signed by Elena Ugalde PA-C at 22 0846          Physical Therapy Notes (most recent note)      Emmy Castellanos, PT at 22 1400  Version 1 of 1         Patient Name: Omkar Nava  : 1955    MRN: [id number]                              Today's Date: 2022       Admit Date: 10/14/2022    Visit Dx:     ICD-10-CM ICD-9-CM   1. Osteomyelitis of toe of left foot (HCC)  M86.9 730.27   2. Anemia, unspecified type  D64.9 285.9   3. Hyperglycemia  R73.9 790.29   4. 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, without  long-term current use of insulin   • Orthostatic hypotension   • Acute osteomyelitis of left foot (HCC)   • Type 2 diabetes mellitus (HCC)   • Essential hypertension   • Psychotic disorder (HCC)   • Neurocognitive disorder     Past Medical History:   Diagnosis Date   • Diabetes mellitus (HCC)    • GERD (gastroesophageal reflux disease)      Past Surgical History:   Procedure Laterality Date   • AMPUTATION FOOT Left 10/18/2022    Procedure: PARTIAL FIRST RAY AMPUTATION LEFT;  Surgeon: Yeison Petty MD;  Location: [facility];  Service: Orthopedics;  Laterality: Left;   • EYE SURGERY        General Information     Row Name 11/02/22 1413          Physical Therapy Time and Intention    Document Type progress note/recertification  -ES     Mode of Treatment physical therapy  -ES     Row Name 11/02/22 1413          General Information    Patient Profile Reviewed yes  -ES     Existing Precautions/Restrictions fall;non-weight bearing;left;other (see comments)  nwb,vision loss, dementia  -ES     Barriers to Rehab cognitive status;visual deficit  -ES     Row Name 11/02/22 1413          Cognition    Orientation Status (Cognition) unable/difficult to assess  -ES     Row Name 11/02/22 1413          Safety Issues, Functional Mobility    Safety Issues Affecting Function (Mobility) awareness of need for assistance;insight into deficits/self-awareness;judgment;problem-solving;safety precaution awareness;safety precautions follow-through/compliance  -ES     Impairments Affecting Function (Mobility) balance;cognition;coordination;endurance/activity tolerance;motor control;postural/trunk control;strength;visual/perceptual  -ES     Comment, Safety Issues/Impairments (Mobility) OOB activity not attempted today due to increased fatigue and impaired command following  -ES           User Key  (r) = Recorded By, (t) = Taken By, (c) = Cosigned By    Initials Name Provider Type    ES Emmy Castellanos PT Physical Therapist               Mobility      Row Name 11/02/22 1415          Bed Mobility    Rolling Right [place] (Bed Mobility) maximum assist (25% patient effort);verbal cues  -ES     Row Name 11/02/22 1415          Transfers    Comment, (Transfers) OOB activity deferred today due to increased fatigue and impaired command following  -ES     Row Name 11/02/22 1415          Gait/Stairs (Locomotion)    [place] Level (Gait) unable to assess  -ES     Comment, (Gait/Stairs) Not appropriate to attempt at this time  -ES     Row Name 11/02/22 1415          Mobility    Extremity Weight-bearing Status left lower extremity  -ES     Left Lower Extremity (Weight-bearing Status) non weight-bearing (NWB)  -ES           User Key  (r) = Recorded By, (t) = Taken By, (c) = Cosigned By    Initials Name Provider Type    ES Emmy Castellanos PT Physical Therapist               Obj/Interventions     Row Name 11/02/22 1416          Motor Skills    Therapeutic Exercise hip;knee;ankle;other (see comments)  Required max v/c and modA to complete therex  -ES     Row Name 11/02/22 1416          Hip (Therapeutic Exercise)    Hip (Therapeutic Exercise) strengthening exercise  -ES     Hip Strengthening (Therapeutic Exercise) right;aBduction;heel slides;10 repetitions  -ES     Row Name 11/02/22 1416          Knee (Therapeutic Exercise)    Knee (Therapeutic Exercise) strengthening exercise;isometric exercises  -ES     Knee Isometrics (Therapeutic Exercise) right;quad sets;10 repetitions  -ES     Knee Strengthening (Therapeutic Exercise) right;heel slides  -ES     Row Name 11/02/22 1416          Ankle (Therapeutic Exercise)    Ankle (Therapeutic Exercise) AROM (active range of motion)  -ES     Ankle AROM (Therapeutic Exercise) right;dorsiflexion;plantarflexion;10 repetitions  -ES     Row Name 11/02/22 1416          Balance    Comment, Balance Unable to assess OOB activity due to increased fatigue and impaired command following  -ES           User Key  (r) = Recorded By, (t) =  Taken By, (c) = Cosigned By    Initials Name Provider Type    ES Emmy Castellanos, PT Physical Therapist               Goals/Plan     Row Name 11/02/22 1422          Bed Mobility Goal 1 (PT)    Activity/Assistive Device (Bed Mobility Goal 1, PT) bed mobility activities, all;sit to supine;supine to sit;scooting  -ES     [place] Level/Cues Needed (Bed Mobility Goal 1, PT) minimum assist (75% or more patient effort)  -ES     Time Frame (Bed Mobility Goal 1, PT) 2 weeks  -ES     Progress/Outcomes (Bed Mobility Goal 1, PT) progress slower than expected;goal revised this date  -ES     Row Name 11/02/22 1422          Transfer Goal 1 (PT)    Activity/Assistive Device (Transfer Goal 1, PT) sit-to-stand/stand-to-sit;bed-to-chair/chair-to-bed  -ES     [place] Level/Cues Needed (Transfer Goal 1, PT) moderate assist (50-74% patient effort)  -ES     Time Frame (Transfer Goal 1, PT) 2 weeks  -ES     Progress/Outcome (Transfer Goal 1, PT) progress slower than expected;goal revised this date  -ES     Row Name 11/02/22 1422          Therapy Assessment/Plan (PT)    Planned Therapy Interventions (PT) balance training;bed mobility training;gait training;home exercise program;neuromuscular re-education;patient/family education;strengthening;transfer training  -ES           User Key  (r) = Recorded By, (t) = Taken By, (c) = Cosigned By    Initials Name Provider Type    ES Emmy Castellanos, PT Physical Therapist               Clinical Impression     Row Name 11/02/22 1419          Pain    Pretreatment Pain Rating 0/10 - no pain  -ES     Posttreatment Pain Rating 0/10 - no pain  -ES     Pre/Posttreatment Pain Comment Pt denied pain and did not give any physical indications of pain throughout treatment  -ES     Pain Intervention(s) Repositioned;Ambulation/increased activity  -ES     Row Name 11/02/22 1419          Plan of Care Review    Plan of Care Reviewed With patient  -ES     Progress no change  -ES     Outcome Evaluation Pt  more alert today and able to answer questions with frequent prompting/redirecting. Increased time required for education regarding importance of participating with therapy in recovery process. Pt presented with increased fatigue throughout session and required modA to complete exercises. Will continue to progress as able.  -ES     Row Name 11/02/22 1419          Therapy Assessment/Plan (PT)    Rehab Potential (PT) fair, will monitor progress closely  -ES     Criteria for Skilled Interventions Met (PT) yes;meets criteria  -ES     Therapy Frequency (PT) 3 times/wk  -ES     Row Name 11/02/22 1419          Vital Signs    Pre Systolic BP Rehab --  VSS. cleared by RN.  -ES     O2 Delivery Pre Treatment room air  -ES     O2 Delivery Intra Treatment room air  -ES     O2 Delivery Post Treatment room air  -ES     Pre Patient Position Supine  -ES     Intra Patient Position Supine  -ES     Post Patient Position Supine  -ES     Row Name 11/02/22 1419          Positioning and Restraints    Pre-Treatment Position in bed  -ES     Post Treatment Position bed  -ES     In Bed notified nsg;fowlers;call light within reach;encouraged to call for assist;exit alarm on;side rails up x2;LLE elevated  -ES           User Key  (r) = Recorded By, (t) = Taken By, (c) = Cosigned By    Initials Name Provider Type    Emmy Arriaga, PT Physical Therapist               Outcome Measures     Row Name 11/02/22 1420          How much help from another person do you currently need...    Turning from your back to your side while in flat bed without using bedrails? 3  -ES     Moving from lying on back to sitting on the side of a flat bed without bedrails? 3  -ES     Moving to and from a bed to a chair (including a wheelchair)? 1  -ES     Standing up from a chair using your arms (e.g., wheelchair, bedside chair)? 1  -ES     Climbing 3-5 steps with a railing? 1  -ES     To walk in hospital room? 1  -ES     AM-PAC 6 Clicks Score (PT) 10  -ES     Highest  level of mobility 4 --> Transferred to chair/commode  -ES     Row Name 11/02/22 1423          Functional Assessment    Outcome Measure Options AM-PAC 6 Clicks Basic Mobility (PT)  -ES           User Key  (r) = Recorded By, (t) = Taken By, (c) = Cosigned By    Initials Name Provider Type    Emmy Arriaga PT Physical Therapist                             Physical Therapy Education     Title: PT OT SLP Therapies (Resolved)     Topic: Physical Therapy (Resolved)     Point: Mobility training (Resolved)     Learning Progress Summary           Patient Acceptance, E, VU by  at 10/26/2022 2333    Eager, E, VU by SC at 10/26/2022 1543    Comment: reviewed HEp    Acceptance, E, VU by  at 10/25/2022 2209    Acceptance, E, NR by SC at 10/25/2022 1633    Comment: reviewed benefits of sitting up    Acceptance, E, VU by  at 10/22/2022 2204    Acceptance, E, NL by SC at 10/19/2022 1512    Comment: reviewed benefits of actiity                   Point: Home exercise program (Resolved)     Learning Progress Summary           Patient Acceptance, E, VU by  at 10/26/2022 2333    Eager, E, VU by SC at 10/26/2022 1543    Comment: reviewed HEp    Acceptance, E, VU by  at 10/25/2022 2209    Acceptance, E, NR by SC at 10/25/2022 1633    Comment: reviewed benefits of sitting up    Acceptance, E, VU by  at 10/22/2022 2204    Acceptance, E, NL by SC at 10/19/2022 1512    Comment: reviewed benefits of actiity                   Point: Body mechanics (Resolved)     Learning Progress Summary           Patient Acceptance, E, VU by  at 10/26/2022 2333    Eager, E, VU by SC at 10/26/2022 1543    Comment: reviewed HEp    Acceptance, E, VU by LS at 10/25/2022 2209    Acceptance, E, NR by SC at 10/25/2022 1633    Comment: reviewed benefits of sitting up    Acceptance, E, VU by  at 10/22/2022 2204    Acceptance, E, NL by SC at 10/19/2022 1512    Comment: reviewed benefits of actiity                   Point: Precautions (Resolved)      Learning Progress Summary           Patient Acceptance, E, VU by  at 10/26/2022 2333    Eager, E, VU by SC at 10/26/2022 1543    Comment: reviewed HEp    Acceptance, E, VU by  at 10/25/2022 2209    Acceptance, E, NR by SC at 10/25/2022 1633    Comment: reviewed benefits of sitting up    Acceptance, E, VU by  at 10/22/2022 2204    Acceptance, E, NL by SC at 10/19/2022 1512    Comment: reviewed benefits of actiity                               User Key     Initials Effective Dates Name Provider Type Discipline    SC 06/16/21 -  Efra Cristobal PT Physical Therapist PT     09/22/22 -  Augusta Singleton RN Registered Nurse Nurse              PT Recommendation and Plan  Planned Therapy Interventions (PT): balance training, bed mobility training, gait training, home exercise program, neuromuscular re-education, patient/family education, strengthening, transfer training  Plan of Care Reviewed With: patient  Progress: no change  Outcome Evaluation: Pt more alert today and able to answer questions with frequent prompting/redirecting. Increased time required for education regarding importance of participating with therapy in recovery process. Pt presented with increased fatigue throughout session and required modA to complete exercises. Will continue to progress as able.     Time Calculation:    PT Charges     Row Name 11/02/22 1424             Time Calculation    Start Time 1400  -ES      PT Received On 11/02/22  -ES      PT Goal Re-Cert Due Date 11/12/22  -ES         Time Calculation- PT    Total Timed Code Minutes- PT 18 minute(s)  -ES         Timed Charges    49238 - PT Therapeutic Exercise Minutes 18  -ES         Total Minutes    Timed Charges Total Minutes 18  -ES       Total Minutes 18  -ES            User Key  (r) = Recorded By, (t) = Taken By, (c) = Cosigned By    Initials Name Provider Type    ES Emmy Castellanos, PT Physical Therapist              Therapy Charges for Today     Code Description Service Date  Service Provider Modifiers Qty    [id number]  PT THER PROC EA 15 MIN 2022 Emmy Castellanos, PT GP 1          PT G-Codes  Outcome Measure Options: AM-PAC 6 Clicks Basic Mobility (PT)  AM-PAC 6 Clicks Score (PT): 10  AM-PAC 6 Clicks Score (OT): 10    Emmy Castellanos PT  2022      Electronically signed by Emmy Castellanos, PT at 22 1425          Occupational Therapy Notes (most recent note)      Jenniffer Lee, OT at 22 1505          Patient Name: Omkar Nava  : 1955    MRN: [id number]                              Today's Date: 2022       Admit Date: 10/14/2022    Visit Dx:     ICD-10-CM ICD-9-CM   1. Osteomyelitis of toe of left foot (HCC)  M86.9 730.27   2. Anemia, unspecified type  D64.9 285.9   3. Hyperglycemia  R73.9 790.29   4. 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, without long-term current use of insulin   • Orthostatic hypotension   • Acute osteomyelitis of left foot (HCC)   • Type 2 diabetes mellitus (HCC)   • Essential hypertension   • Psychotic disorder (HCC)   • Neurocognitive disorder     Past Medical History:   Diagnosis Date   • Diabetes mellitus (HCC)    • GERD (gastroesophageal reflux disease)      Past Surgical History:   Procedure Laterality Date   • AMPUTATION FOOT Left 10/18/2022    Procedure: PARTIAL FIRST RAY AMPUTATION LEFT;  Surgeon: Yeison Petty MD;  Location: [facility];  Service: Orthopedics;  Laterality: Left;   • EYE SURGERY        General Information     Row Name 22 1520          OT Time and Intention    Document Type progress note/recertification  -JY     Mode of Treatment occupational therapy;individual therapy  -JY     Row Name 22 1520          General Information    Patient Profile Reviewed yes  -JY     Existing  Precautions/Restrictions fall;non-weight bearing;left;other (see comments)  NWB LLE, low vision, cognitive deficits w/ baseline dementia  -JY     Barriers to Rehab cognitive status;visual deficit  -OBIEY     Row Name 11/02/22 1520          Cognition    Orientation Status (Cognition) oriented to;person;other (see comments);disoriented to;place;time;situation  conversational confusion still present and grossly only oriented to self, intermittent periods of improved alertness  -SANDRA     Row Name 11/02/22 1520          Safety Issues, Functional Mobility    Safety Issues Affecting Function (Mobility) awareness of need for assistance;insight into deficits/self-awareness;judgment;problem-solving;safety precaution awareness;safety precautions follow-through/compliance;sequencing abilities  -JY     Impairments Affecting Function (Mobility) balance;cognition;coordination;endurance/activity tolerance;motor control;postural/trunk control;strength;visual/perceptual  -JY     Cognitive Impairments, Mobility Safety/Performance insight into deficits/self-awareness;judgment;problem-solving/reasoning;safety precaution awareness;safety precaution follow-through;sequencing abilities  -JY     Comment, Safety Issues/Impairments (Mobility) OOB activity not completed today d/t intermittent periods of fatigue, conversational confusion and inconsistent command following (improved)  -SANDRA           User Key  (r) = Recorded By, (t) = Taken By, (c) = Cosigned By    Initials Name Provider Type    Jenniffer De Jesus, OT Occupational Therapist                 Mobility/ADL's     Row Name 11/02/22 1524          Bed Mobility    Comment, (Bed Mobility) pt sitting up in bed with good positioning observed and OOB activity deferred thus bed mobility not assessed this date; pt able to sit up in bed Mala  -SANDRA     Row Name 11/02/22 1524          Transfers    Comment, (Transfers) OOB activity deferred this date d/t intermittent lethargy and inconsistent command  following, both of which are safety concerns  -SARAH     Row Name 11/02/22 1524          Functional Mobility    Functional Mobility- Comment defer to PT for specifics  -     Row Name 11/02/22 1524          Activities of Daily Living    BADL Assessment/Intervention grooming;upper body dressing  -[facility] Name 11/02/22 1524          Mobility    Extremity Weight-bearing Status left lower extremity  -J     Left Lower Extremity (Weight-bearing Status) non weight-bearing (NWB)   -[facility] Name 11/02/22 1524          Upper Body Dressing Assessment/Training    [place] Level (Upper Body Dressing) doff;don;pajama/robe;other (see comments)  pt grossly deferred authentic d/d gown however able to verbalize correct seq for threading and unthreading gown  -JY     Position (Upper Body Dressing) sitting up in bed  -JY     Comment, (Upper Body Dressing) pt grossly deferred authentic d/d gown however able to verbalize correct seq for threading and unthreading gown  -     Row Name 11/02/22 1524          Lower Body Dressing Assessment/Training    [place]end Level (Lower Body Dressing) doff;don;socks;dependent (less than 25% patient effort)  -JY     Position (Lower Body Dressing) supine  -JY     Comment, (Lower Body Dressing) gross dep for safe d/d sock to R foot  -[facility] Name 11/02/22 1524          Grooming Assessment/Training    Bend Level (Grooming) wash face, hands;minimum assist (75% patient effort);verbal cues  -JY     Position (Grooming) sitting up in bed  -JY     Comment, (Grooming) min A for t/f to face with cloth to wash, skilled cues for initiation and follow through, pt's attention deficits to task require physical and cognitive A  -JY           User Key  (r) = Recorded By, (t) = Taken By, (c) = Cosigned By    Initials Name Provider Type    Jenniffer De Jesus OT Occupational Therapist               Obj/Interventions     Row Name 11/02/22 1528          Strength Comprehensive (MMT)    General Manual  Muscle Testing (MMT) Assessment upper extremity strength deficits identified  -     Comment, General Manual Muscle Testing (MMT) Assessment pt demonstrated improved ability to follow commands for BUE MMS assessment largely at least 4/5 bilaterally  -     Row Name 11/02/22 1528          Motor Skills    Motor Skills functional endurance  -     Functional Endurance pt continues to present with fatigue with more dynamic activity, lethargic at times impacting fluid alertness for more progressive interventions  -     Row Name 11/02/22 1528          Balance    Comment, Balance u/a to assess progressive balance at EOB or OOB this date d/t lethargy and inconsistent command following, no obvious balance concerns when supported up in bed  -J           User Key  (r) = Recorded By, (t) = Taken By, (c) = Cosigned By    Initials Name Provider Type    Jenniffer De Jesus, SHIRAZ Occupational Therapist               Goals/Plan     Row Name 11/02/22 1535          Bed Mobility Goal 1 (OT)    Activity/Assistive Device (Bed Mobility Goal 1, OT) rolling to left;rolling to right;sit to supine;supine to sit  -     [place] Level/Cues Needed (Bed Mobility Goal 1, OT) moderate assist (50-74% patient effort)  -JY     Time Frame (Bed Mobility Goal 1, OT) long term goal (LTG);by discharge  -JY     Progress/Outcomes (Bed Mobility Goal 1, OT) goal ongoing  -     Row Name 11/02/22 1535          Dressing Goal 1 (OT)    Activity/Device (Dressing Goal 1, OT) upper body dressing  -JY     Anderson/Cues Needed (Dressing Goal 1, OT) minimum assist (75% or more patient effort);verbal cues required;tactile cues required  -JY     Time Frame (Dressing Goal 1, OT) long term goal (LTG);by discharge  -JY     Progress/Outcome (Dressing Goal 1, OT) goal ongoing;goal revised this date  -     Row Name 11/02/22 1535          Grooming Goal 1 (OT)    Activity/Device (Grooming Goal 1, OT) hair care;wash face, hands;oral care  -J     Anderson  (Grooming Goal 1, OT) contact guard required;minimum assist (75% or more patient effort);verbal cues required  -JY     Time Frame (Grooming Goal 1, OT) long term goal (LTG);by discharge  -JY     Progress/Outcome (Grooming Goal 1, OT) goal ongoing;goal revised this date  -JY     Row Name 11/02/22 1535          Strength Goal 1 (OT)    Strength Goal 1 (OT) Pt to complete seated HEP consistent with pt's physical and cognitive needs encompassing BUEs focused on strength and endurance w/ progressive sets/reps/resistance in order to progress integration in ADLs, related t/fs  -JY     Time Frame (Strength Goal 1, OT) long term goal (LTG);by discharge  -JY     Progress/Outcome (Strength Goal 1, OT) goal ongoing  -JY     Row Name 11/02/22 1535          Therapy Assessment/Plan (OT)    Planned Therapy Interventions (OT) activity tolerance training;BADL retraining;functional balance retraining;occupation/activity based interventions;patient/caregiver education/training;ROM/therapeutic exercise;strengthening exercise;transfer/mobility retraining  -JY           User Key  (r) = Recorded By, (t) = Taken By, (c) = Cosigned By    Initials Name Provider Type    Jenniffer De Jesus OT Occupational Therapist               Clinical Impression     Row Name 11/02/22 1530          Pain Assessment    Pretreatment Pain Rating 0/10 - no pain  -JY     Posttreatment Pain Rating 0/10 - no pain  -JY     Pre/Posttreatment Pain Comment denies any pain  -JY     Pain Intervention(s) Ambulation/increased activity  -JY     Row Name 11/02/22 1535          Plan of Care Review    Plan of Care Reviewed With patient  -JY     Progress no change  -JY     Outcome Evaluation OT re cert completed this date. Pt presented with improved alertness and w/o agitation compared to recent sessions however still oriented to self only and w/ conversational confusion, lethargy and inconsistent command following. Pt able to verbally seq steps for d/d gown and UE movement to  support more I UBD and washed face with min A after cues for initiation and follow through. Continues to be dependent for d/d R sock. Pt able to state NWB precaution when cued. Pt sat up through session and also demonstrated I eating of snack. Pt grossly demonstrates small functional gains, grossly more involvement and fxl task awareness. Pt will need further ADL retraining and plan to provide services as pt able to tolerate while hospitalized. Goals graded as necessary. Continue to recommend SNF at d/c.  -JY     Row Name 11/02/22 1530          Therapy Assessment/Plan (OT)    Criteria for Skilled Therapeutic Interventions Met (OT) yes;skilled treatment is necessary  -JY     Therapy Frequency (OT) daily  -     Row Name 11/02/22 1530          Therapy Plan Review/Discharge Plan (OT)    Anticipated Discharge Disposition (OT) skilled nursing facility  -     Row Name 11/02/22 1530          Vital Signs    Pre Systolic BP Rehab 149  -JY     Pre Treatment Diastolic BP 68  -JY     Pretreatment Heart Rate (beats/min) 78  -JY     Pre SpO2 (%) 96  -JY     Pre Patient Position Supine  -JY     Intra Patient Position Supine  -JY     Post Patient Position Supine  -JY     Row Name 11/02/22 1530          Positioning and Restraints    Pre-Treatment Position in bed  -JY     Post Treatment Position bed  -JY     In Bed notified nsg;fowlers;call light within reach;encouraged to call for assist;exit alarm on;side rails up x2  -JY           User Key  (r) = Recorded By, (t) = Taken By, (c) = Cosigned By    Initials Name Provider Type    Jenniffer De Jesus, OT Occupational Therapist               Outcome Measures     Row Name 11/02/22 1536          How much help from another is currently needed...    Putting on and taking off regular lower body clothing? 1  -JY     Bathing (including washing, rinsing, and drying) 1  -JY     Toileting (which includes using toilet bed pan or urinal) 1  -JY     Putting on and taking off regular upper body  clothing 2  -JY     Taking care of personal grooming (such as brushing teeth) 3  -JY     Eating meals 3  -JY     AM-PAC 6 Clicks Score (OT) 11  -JY     Row Name 11/02/22 1423          How much help from another person do you currently need...    Turning from your back to your side while in flat bed without using bedrails? 3  -ES     Moving from lying on back to sitting on the side of a flat bed without bedrails? 3  -ES     Moving to and from a bed to a chair (including a wheelchair)? 1  -ES     Standing up from a chair using your arms (e.g., wheelchair, bedside chair)? 1  -ES     Climbing 3-5 steps with a railing? 1  -ES     To walk in hospital room? 1  -ES     AM-PAC 6 Clicks Score (PT) 10  -ES     Highest level of mobility 4 --> Transferred to chair/commode  -ES     Row Name 11/02/22 1536 11/02/22 1423       Functional Assessment    Outcome Measure Options AM-PAC 6 Clicks Daily Activity (OT)  -JY AM-PAC 6 Clicks Basic Mobility (PT)  -ES          User Key  (r) = Recorded By, (t) = Taken By, (c) = Cosigned By    Initials Name Provider Type    Jenniffer De Jesus OT Occupational Therapist    Emmy Arriaga PT Physical Therapist                Occupational Therapy Education     Title: PT OT SLP Therapies (In Progress)     Topic: Occupational Therapy (In Progress)     Point: ADL training (In Progress)     Description:   Instruct learner(s) on proper safety adaptation and remediation techniques during self care or transfers.   Instruct in proper use of assistive devices.              Learning Progress Summary           Patient Acceptance, E,D, NR by SANDRA at 11/2/2022 1505    Acceptance, E, VU by PEG at 10/26/2022 2333    Acceptance, E, VU by PEG at 10/25/2022 2209    Acceptance, E,D, NR by SANDRA at 10/24/2022 1340    Acceptance, E, VU by PEG at 10/22/2022 2204    Acceptance, E,D, NR by SANDRA at 10/19/2022 1445                   Point: Home exercise program (Done)     Description:   Instruct learner(s) on appropriate technique  for monitoring, assisting and/or progressing therapeutic exercises/activities.              Learning Progress Summary           Patient Acceptance, E, VU by PEG at 10/26/2022 2333    Acceptance, E, VU by  at 10/25/2022 2209    Acceptance, E, VU by  at 10/22/2022 2204                   Point: Precautions (In Progress)     Description:   Instruct learner(s) on prescribed precautions during self-care and functional transfers.              Learning Progress Summary           Patient Acceptance, E,D, NR by SANDRA at 11/2/2022 1505    Acceptance, E, VU by PEG at 10/26/2022 2333    Acceptance, E, VU by LS at 10/25/2022 2209    Acceptance, E,D, NR by SANDRA at 10/24/2022 1340    Acceptance, E, VU by PEG at 10/22/2022 2204    Acceptance, E,D, NR by SANDRA at 10/19/2022 1445                   Point: Body mechanics (In Progress)     Description:   Instruct learner(s) on proper positioning and spine alignment during self-care, functional mobility activities and/or exercises.              Learning Progress Summary           Patient Acceptance, E,D, NR by SANDRA at 11/2/2022 1505    Acceptance, E, VU by PEG at 10/26/2022 2333    Acceptance, E, VU by PEG at 10/25/2022 2209    Acceptance, E,D, NR by SANDRA at 10/24/2022 1340    Acceptance, E, VU by  at 10/22/2022 2204    Acceptance, E,D, NR by SANDRA at 10/19/2022 1445                               User Key     Initials Effective Dates Name Provider Type Discipline    SANDRA 06/16/21 -  Jenniffer Lee OT Occupational Therapist OT     09/22/22 -  Augusta Singleton, RN Registered Nurse Nurse              OT Recommendation and Plan  Planned Therapy Interventions (OT): activity tolerance training, BADL retraining, functional balance retraining, occupation/activity based interventions, patient/caregiver education/training, ROM/therapeutic exercise, strengthening exercise, transfer/mobility retraining  Therapy Frequency (OT): daily  Plan of Care Review  Plan of Care Reviewed With: patient  Progress: no  change  Outcome Evaluation: OT re cert completed this date. Pt presented with improved alertness and w/o agitation compared to recent sessions however still oriented to self only and w/ conversational confusion, lethargy and inconsistent command following. Pt able to verbally seq steps for d/d gown and UE movement to support more I UBD and washed face with min A after cues for initiation and follow through. Continues to be dependent for d/d R sock. Pt able to state NWB precaution when cued. Pt sat up through session and also demonstrated I eating of snack. Pt grossly demonstrates small functional gains, grossly more involvement and fxl task awareness. Pt will need further ADL retraining and plan to provide services as pt able to tolerate while hospitalized. Goals graded as necessary. Continue to recommend SNF at d/c.     Time Calculation:    Time Calculation- OT     Row Name 11/02/22 1538             Time Calculation- OT    OT Start Time 1505  -JY      OT Received On 11/02/22  -JY      OT Goal Re-Cert Due Date 11/12/22  -JY         Timed Charges    69519 - OT Therapeutic Activity Minutes 7  -JY      50856 - OT Self Care/Mgmt Minutes 8  -JY         Total Minutes    Timed Charges Total Minutes 15  -JY       Total Minutes 15  -JY            User Key  (r) = Recorded By, (t) = Taken By, (c) = Cosigned By    Initials Name Provider Type    Jenniffer De Jesus OT Occupational Therapist              Therapy Charges for Today     Code Description Service Date Service Provider Modifiers Qty    [id number] HC OT SELF CARE/MGMT/TRAIN EA 15 MIN 11/2/2022 Jenniffer Lee OT GO 1               Jenniffer Lee OT  11/2/2022    Electronically signed by Jenniffer Lee OT at 11/02/22 1539

## 2022-11-07 NOTE — PROGRESS NOTES
[facility] Medicine Services  PROGRESS NOTE    Patient Name: Omkar Nava  : 1955  MRN: [id number]    Date of Admission: 10/14/2022  Primary Care Physician: Provider, No Known    Subjective     CC: L foot osteomyelitis     HPI:  Sitting in bed, no family at bedside assisting. Finishing breakfast. Says he slept well last night. No issues per nursing staff. BP has been elevated. Reasonable glucose control on Metformin. He is wondering when his surgeon will reassess his foot and when he can begin walking on his heel.     ROS:  Gen- No fevers, chills  CV- No chest pain, palpitations  Resp- No cough, dyspnea  GI- No N/V/D, abd pain    Objective     Vital Signs:   Temp:  [97.6 °F (36.4 °C)-98.9 °F (37.2 °C)] 97.7 °F (36.5 °C)  Heart Rate:  [72-86] 77  Resp:  [16-18] 16  BP: (142-161)/() 145/69     Physical Exam:  Constitutional: No acute distress, awake, alert and conversant. Sitting up in bed   HENT: NCAT, mucous membranes moist  Respiratory: Clear to auscultation bilaterally, respiratory effort normal on room air  Cardiovascular: RRR, no murmurs, rubs, or gallops  Gastrointestinal: Positive bowel sounds, soft, nontender, nondistended  Musculoskeletal: L foot in splint - able to wiggle toes.   Psychiatric: Appropriate affect, cooperative  Neurologic: Moves all extremities spontaneously without focal deficits. Speech clear and coherent     Results Reviewed:  LAB RESULTS:      Lab 22  1111 22  0844   WBC 5.79 6.07   HEMOGLOBIN 7.9* 7.7*   HEMATOCRIT 26.4* 25.4*   PLATELETS 388 296   MCV 91.7 91.7         Lab 22  0417 22  0844   SODIUM 141 135*   POTASSIUM 4.4 4.1   CHLORIDE 105 101   CO2 28.0 24.0   ANION GAP 8.0 10.0   BUN 19 22   CREATININE 0.92 1.00   EGFR 92.3 83.5   GLUCOSE 105* 174*   CALCIUM 9.2 9.0         Brief Urine Lab Results  (Last result in the past 365 days)      Color   Clarity   Blood   Leuk Est   Nitrite   Protein   CREAT   Urine HCG         06/12/22 2056 Yellow   Clear     Negative                   Microbiology Results Abnormal     Procedure Component Value - Date/Time    Fungus Culture - Tissue, Toe, Left [[id number]] Collected: 10/18/22 1608    Lab Status: Preliminary result Specimen: Tissue from Toe, Left Updated: 11/01/22 1815     Fungus Culture No fungus isolated at 2 weeks    AFB Culture - Tissue, Toe, Left [[id number]] Collected: 10/18/22 1608    Lab Status: Preliminary result Specimen: Tissue from Toe, Left Updated: 11/01/22 1815     AFB Culture No AFB isolated at 2 weeks     AFB Stain No acid fast bacilli seen on concentrated smear    Fungus Culture - Tissue, Toe, Left [[id number]] Collected: 10/18/22 1552    Lab Status: Preliminary result Specimen: Tissue from Toe, Left Updated: 11/01/22 1801     Fungus Culture No fungus isolated at 2 weeks    AFB Culture - Tissue, Toe, Left [[id number]] Collected: 10/18/22 1552    Lab Status: Preliminary result Specimen: Tissue from Toe, Left Updated: 11/01/22 1801     AFB Culture No AFB isolated at 2 weeks     AFB Stain No acid fast bacilli seen on concentrated smear    COVID PRE-OP / PRE-PROCEDURE SCREENING ORDER (NO ISOLATION) - Swab, Nasopharynx [[id number]]  (Normal) Collected: 10/27/22 0510    Lab Status: Final result Specimen: Swab from Nasopharynx Updated: 10/27/22 0530    Narrative:      The following orders were created for panel order COVID PRE-OP / PRE-PROCEDURE SCREENING ORDER (NO ISOLATION) - Swab, Nasopharynx.  Procedure                               Abnormality         Status                     ---------                               -----------         ------                     COVID-19, ABBOTT IN-HOUS...[[id number]]  Normal              Final result                 Please view results for these tests on the individual orders.    COVID-19, ABBOTT IN-HOUSE,NASAL Swab (NO TRANSPORT MEDIA) 2 HR TAT - Swab, Nasopharynx [[id number]]  (Normal) Collected: 10/27/22 0510    Lab Status: Final result  Specimen: Swab from Nasopharynx Updated: 10/27/22 0530     COVID19 Presumptive Negative    Narrative:      Fact sheet for providers: https://www.fda.gov/media/830433/download     Fact sheet for patients: https://www.fda.gov/media/402817/download    Test performed by PCR.  If inconsistent with clinical signs and symptoms patient should be tested with different authorized molecular test.    Anaerobic Culture - Tissue, Toe, Left [[id number]]  (Normal) Collected: 10/18/22 1608    Lab Status: Final result Specimen: Tissue from Toe, Left Updated: 10/23/22 0543     Anaerobic Culture No anaerobes isolated at 5 days    Anaerobic Culture - Tissue, Toe, Left [[id number]]  (Normal) Collected: 10/18/22 1552    Lab Status: Final result Specimen: Tissue from Toe, Left Updated: 10/23/22 0543     Anaerobic Culture No anaerobes isolated at 5 days    Tissue / Bone Culture - Tissue, Toe, Left [[id number]] Collected: 10/18/22 1552    Lab Status: Final result Specimen: Tissue from Toe, Left Updated: 10/21/22 0840     Tissue Culture No growth at 3 days     Gram Stain Moderate (3+) WBCs seen      No organisms seen    Blood Culture - Blood, Hand, Left [[id number]]  (Normal) Collected: 10/14/22 2115    Lab Status: Final result Specimen: Blood from Hand, Left Updated: 10/19/22 2215     Blood Culture No growth at 5 days    Blood Culture - Blood, Arm, Left [[id number]]  (Normal) Collected: 10/14/22 2115    Lab Status: Final result Specimen: Blood from Arm, Left Updated: 10/19/22 2215     Blood Culture No growth at 5 days    MRSA Screen, PCR (Inpatient) - Swab, Nares [[id number]]  (Normal) Collected: 10/14/22 2345    Lab Status: Final result Specimen: Swab from Nares Updated: 10/15/22 0741     MRSA PCR Negative    Narrative:      The negative predictive value of this diagnostic test is high and should only be used to consider de-escalating anti-MRSA therapy. A positive result may indicate colonization with MRSA and must be correlated clinically.  MRSA  Negative        No radiology results from the last 24 hrs    I have reviewed the medications:  Scheduled Meds:cefTRIAXone, 2 g, Intravenous, Q24H  ferrous sulfate, 325 mg, Oral, Daily With Breakfast  glipizide, 2.5 mg, Oral, BID AC  heparin (porcine), 5,000 Units, Subcutaneous, Q8H  lisinopril, 40 mg, Oral, Daily  metFORMIN, 1,000 mg, Oral, BID With Meals  NIFEdipine XL, 30 mg, Oral, Q24H  OLANZapine, 5 mg, Oral, Daily   And  OLANZapine, 10 mg, Oral, Nightly  senna-docusate sodium, 2 tablet, Oral, BID  sodium chloride, 10 mL, Intravenous, Q12H  vancomycin (dosing per levels), , Does not apply, Daily  vancomycin, 750 mg, Intravenous, Q12H      Continuous Infusions:   PRN Meds:.•  acetaminophen **OR** acetaminophen  •  bisacodyl  •  dextrose  •  dextrose  •  glucagon (human recombinant)  •  magnesium sulfate **OR** magnesium sulfate in D5W 1g/100mL (PREMIX) **OR** magnesium sulfate  •  melatonin  •  [] HYDROmorphone **AND** naloxone  •  polyethylene glycol  •  Insert peripheral IV **AND** sodium chloride    Assessment & Plan     Active Hospital Problems    Diagnosis  POA   • **Acute osteomyelitis of left foot (HCC) [M86.172]  Yes   • Neurocognitive disorder [R41.9]  Unknown   • Type 2 diabetes mellitus (HCC) [E11.9]  Yes   • Essential hypertension [I10]  Yes   • Psychotic disorder (HCC) [F29]  Yes      Resolved Hospital Problems   No resolved problems to display.     Brief Hospital Course to date:  Omkar Nava is a 67 y.o. male with PMH significant for HTN, DMII and unspecified psychotic disorder. He previously worked as a  but has been unable to work for past 2-3 years and has become homeless.     His health declined in 2022 with an acute psychotic event with subsequent psychiatric hospitalization at [facility] from  to 2022. He was subsequently transferred to a nursing facility in [place]. On 22, daughter visited him at the nursing home and he  had a significant injury to his L great toe, causing an open articular fracture. He was admitted to [facility] and treated at that time with bedside flushing and repair by podiatry. He was given Ancef and sent back to nursing facility on Keflex. Wound cultures at that time grew MRSA, however blood culture showed no growth. On 9/27/2022, he presented to [facility] in [place] due to worsening foot wound and drainage. He underwent L foot I&D and debridement on 9/30/22. A PICC was placed on 10/3/22. He was to receive Daptomycin but due to expense, this was changed to Rifampin and Linezolid and PICC was removed. He was discharged to [facility] in [facility] on 10/6/22 and at some point, was transferred to [place], in order to be closer to family. Daughter visited him on 10/14/22 and became concerned about the appearance of his toe. She brought him to [facility] ED for further evaluation. ID and orthopedic surgery follow.     Osteomyelitis of left foot / L first toe (s/p amputation)  Wound culture (+) Raoultella ornithinolytica   - 10/15/22 MRI of L foot showed wound around the great toe with geographic marrow signal alteration within the distal aspect of the first metatarsal as well as both phalanges of the great toe indicative of osteomyelitis  - He is s/p L great toe amputation on 10/18/22 by Dr. Petty  - NWB to LLE. Elevated LLE. Keep splint clean and dry. SQ heparin for DVT PPX while inpatient. Discharge on ASA 325mg daily x 30 days  - I notified Dr. Petty of patient's continued hospitalization. He will re-evaluate patient today and anticipates removing splint and allowing patient to begin heel-weight bearing on LLE today   - ID following. PICC placed 10/27/22. Planning for IV Vancomycin / Rocephin through 11/25/22. Follow up with Dr. Gordon in 2 weeks.     Iron-deficiency anemia   - Completed 3 days IV iron on 10/20/22  - Hgb stable. Monitor periodically     Non-insulin  dependent DMII  - Hgb A1c 7.0%   - Continue Metformin 1000mg BID, add Glipizide 2.5mg BID   - Continue TID accuchecks     Hypertension  - Stop Amlodipine. Start Nifedipine XL 30mg daily, continue Lisinopril 40mg PO daily     Neurocognitive disorder  Dementia   - Re-started olanzapine 10/15/22, was recently hospitalized at [facility] for psychiatric issues; apparently PRN Haldol was not much help, have discontinued  - Continue Olanzapine 5mg QAM and 10mg QHS  - Overall stable    Expected Discharge Location and Transportation: rehab with goal to transition to LTC. Private vehicle vs WC van   Expected Discharge Date: 11/19/22    DVT prophylaxis:Medical and mechanical DVT prophylaxis orders are present.     AM-PAC 6 Clicks Score (PT): 18 (11/06/22 0800)    CODE STATUS:   Code Status and Medical Interventions:   Ordered at: 10/14/22 2230     Level Of Support Discussed With:    Patient     Code Status (Patient has no pulse and is not breathing):    CPR (Attempt to Resuscitate)     Medical Interventions (Patient has pulse or is breathing):    Full Support     Elena Ugalde PA-C  11/07/22

## 2022-11-07 NOTE — THERAPY RE-EVALUATION
Patient Name: Omkar Nava  : 1955    MRN: [id number]                              Today's Date: 2022       Admit Date: 10/14/2022    Visit Dx:     ICD-10-CM ICD-9-CM   1. Osteomyelitis of toe of left foot (HCC)  M86.9 730.27   2. Anemia, unspecified type  D64.9 285.9   3. Hyperglycemia  R73.9 790.29   4. 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, without long-term current use of insulin   • Orthostatic hypotension   • Acute osteomyelitis of left foot (HCC)   • Type 2 diabetes mellitus (HCC)   • Essential hypertension   • Psychotic disorder (HCC)   • Neurocognitive disorder     Past Medical History:   Diagnosis Date   • Diabetes mellitus (HCC)    • GERD (gastroesophageal reflux disease)      Past Surgical History:   Procedure Laterality Date   • AMPUTATION FOOT Left 10/18/2022    Procedure: PARTIAL FIRST RAY AMPUTATION LEFT;  Surgeon: Yeison Petty MD;  Location: [facility];  Service: Orthopedics;  Laterality: Left;   • EYE SURGERY        General Information     Row Name 22          Physical Therapy Time and Intention    Document Type re-evaluation;therapy note (daily note)  -MB     Mode of Treatment physical therapy  -MB     Row Name 22          General Information    Patient Profile Reviewed yes  -MB     Existing Precautions/Restrictions fall;left;weight bearing;other (see comments)  per ortho 22: pt heel WBAT while in forefoot offloading shoe at E, baseline vision deficits, dementia  -MB     Barriers to Rehab medically complex;cognitive status;visual deficit  -MB     Row Name 22          Cognition    Orientation Status (Cognition) oriented to;person;place  -MB     Row Name 22          Safety Issues, Functional Mobility    Safety  Issues Affecting Function (Mobility) awareness of need for assistance;insight into deficits/self-awareness  -MB     Impairments Affecting Function (Mobility) balance;cognition;coordination;endurance/activity tolerance;motor control;postural/trunk control;strength;visual/perceptual  -MB           User Key  (r) = Recorded By, (t) = Taken By, (c) = Cosigned By    Initials Name Provider Type    MB Soo Lane, PT Physical Therapist               Mobility     Row Name 11/07/22 0922          Bed Mobility    Supine-Sit [place] (Bed Mobility) contact guard;verbal cues  -MB     Sit-Supine [place] (Bed Mobility) not tested  -MB     Assistive Device (Bed Mobility) head of bed elevated;bed rails  -MB     Comment, (Bed Mobility) Offloading shoe donned upon arrival. Pt. required brief assist to support trunk, VCs/tactile cues for sequencing, and increased time to complete.  -MB     Row Name 11/07/22 0922          Transfers    Comment, (Transfers) STS from EOB/recliner w/ VCs/tactile cues for safe hand placement and sequencing. Pt. demo posterolateral lean to pt's R in standing, requiring consistent cueing to correct.  -MB     Row Name 11/07/22 0922          Bed-Chair Transfer    Bed-Chair [place] (Transfers) moderate assist (50% patient effort);2 person assist;verbal cues;nonverbal cues (demo/gesture)  -MB     Assistive Device (Bed-Chair Transfers) walker, front-wheeled  -MB     Row Name 11/07/22 0922          Sit-Stand Transfer    Sit-Stand [place] (Transfers) minimum assist (75% patient effort);2 person assist;verbal cues;nonverbal cues (demo/gesture)  -MB     Assistive Device (Sit-Stand Transfers) walker, front-wheeled  -MB     Row Name 11/07/22 0922          Gait/Stairs (Locomotion)    [place] Level (Gait) minimum assist (75% patient effort);2 person assist;verbal cues;nonverbal cues (demo/gesture);1 person to manage equipment  -MB     Assistive Device (Gait) walker, front-wheeled  -MB      Distance in Feet (Gait) 15  -MB     Deviations/Abnormal Patterns (Gait) arianne decreased;left sided deviations;gait speed decreased;antalgic;stride length decreased;weight shifting decreased  -MB     Bilateral Gait Deviations forward flexed posture  -MB     Comment, (Gait/Stairs) Pt. ambulated w/ step to gait mechanics (WBAT LLE in forefoot offloading shoe) w/ slow arianne and dec B step length. Pt. required VCs for step sequencing, upright posture, and assist to manage RW. (Donned R shoe for improved balance/stability.)  -MB     Row Name 11/07/22 0922          Mobility    Extremity Weight-bearing Status left lower extremity  -MB     Left Lower Extremity (Weight-bearing Status) weight-bearing as tolerated (WBAT);other (see comments)  per ortho as of 11/7/22 heel WBAT in forefoot offloading shoe  -MB           User Key  (r) = Recorded By, (t) = Taken By, (c) = Cosigned By    Initials Name Provider Type    MB Soo Lane, PT Physical Therapist               Obj/Interventions     Row Name 11/07/22 1334          Motor Skills    Therapeutic Exercise hip;knee;ankle  -MB     Row Name 11/07/22 1334          Hip (Therapeutic Exercise)    Hip (Therapeutic Exercise) isometric exercises;strengthening exercise  -MB     Hip Isometrics (Therapeutic Exercise) bilateral;gluteal sets;5 repetitions  -MB     Hip Strengthening (Therapeutic Exercise) bilateral;marching while seated;aBduction;15 repititions  -MB     Row Name 11/07/22 1334          Knee (Therapeutic Exercise)    Knee (Therapeutic Exercise) strengthening exercise  -MB     Knee Isometrics (Therapeutic Exercise) bilateral;quad sets;10 repetitions  -MB     Knee Strengthening (Therapeutic Exercise) bilateral;LAQ (long arc quad);10 repetitions  -MB     Row Name 11/07/22 1334          Ankle (Therapeutic Exercise)    Ankle (Therapeutic Exercise) AROM (active range of motion)  -MB     Ankle AROM (Therapeutic Exercise) bilateral;dorsiflexion;plantarflexion;10 repetitions   -MB     Row Name 11/07/22 1334          Balance    Balance Assessment standing static balance;standing dynamic balance  -MB     Static Standing Balance minimal assist;2-person assist  -MB     Dynamic Standing Balance moderate assist;2-person assist  -MB     Position/Device Used, Standing Balance supported;walker, rolling  -MB     Balance Interventions standing;sit to stand;weight shifting activity;dynamic reaching  -MB           User Key  (r) = Recorded By, (t) = Taken By, (c) = Cosigned By    Initials Name Provider Type    Soo Ybarra, PT Physical Therapist               Goals/Plan     Row Name 11/07/22 1336          Bed Mobility Goal 1 (PT)    Activity/Assistive Device (Bed Mobility Goal 1, PT) bed mobility activities, all;sit to supine;supine to sit;scooting  -MB     [place] Level/Cues Needed (Bed Mobility Goal 1, PT) modified independence  -MB     Time Frame (Bed Mobility Goal 1, PT) 1 week  -MB     Progress/Outcomes (Bed Mobility Goal 1, PT) goal revised this date  -MB     Row Name 11/07/22 1336          Transfer Goal 1 (PT)    Activity/Assistive Device (Transfer Goal 1, PT) sit-to-stand/stand-to-sit;bed-to-chair/chair-to-bed  -MB     [place] Level/Cues Needed (Transfer Goal 1, PT) contact guard required  -MB     Time Frame (Transfer Goal 1, PT) 10 days  -MB     Progress/Outcome (Transfer Goal 1, PT) goal revised this date  -MB     Row Name 11/07/22 1336          Gait Training Goal 1 (PT)    Activity/Assistive Device (Gait Training Goal 1, PT) gait (walking locomotion);walker, rolling;improve balance and speed;increase endurance/gait distance;decrease fall risk  WBAT L forefoot offloading shoe  -MB     [place] Level (Gait Training Goal 1, PT) contact guard required  -MB     Distance (Gait Training Goal 1, PT) 50  -MB     Time Frame (Gait Training Goal 1, PT) 2 weeks  -MB     Row Name 11/07/22 1336          Patient Education Goal (PT)    Activity (Patient Education Goal, PT) HEP  -MB      [place]/Cues/Accuracy (Memory Goal 2, PT) demonstrates adequately  -MB     Time Frame (Patient Education Goal, PT) 1 week  -MB     Row Name 11/07/22 4017          Therapy Assessment/Plan (PT)    Planned Therapy Interventions (PT) balance training;bed mobility training;gait training;home exercise program;postural re-education;patient/family education;strengthening;transfer training  -MB           User Key  (r) = Recorded By, (t) = Taken By, (c) = Cosigned By    Initials Name Provider Type    Soo Ybarra, PT Physical Therapist               Clinical Impression     Row Name 11/07/22 1336          Pain    Pretreatment Pain Rating 0/10 - no pain  -MB     Posttreatment Pain Rating 0/10 - no pain  -MB     Row Name 11/07/22 1338          Pain Scale: FACES Pre/Post-Treatment    Pain: FACES Scale, Pretreatment 0-->no hurt  -MB     Posttreatment Pain Rating 0-->no hurt  -MB     Row Name 11/07/22 6452          Plan of Care Review    Plan of Care Reviewed With patient;daughter  -MB     Progress improving  -MB     Outcome Evaluation PT re-eval completed w/ increased LLE weight-bearing status to heel WBAT in forefoot offloading shoe. Patient demonstrated improved ability to follow commands and participate in therapy. He continues to be limited by weakness, impaired balance, and gait instability. Patient completed bed mobility w/ CGA, transfers w/ mod A x 2 improving to min A x 2, and ambulated 15ft w/ RW and min A x 2. PT goals updated to reflect current level of function. PT continues to recommend SNF rehab at D/C.  -MB     Row Name 11/07/22 4295          Therapy Assessment/Plan (PT)    Rehab Potential (PT) good, to achieve stated therapy goals  -MB     Criteria for Skilled Interventions Met (PT) yes;meets criteria  -MB     Therapy Frequency (PT) daily  -MB     Row Name 11/07/22 0622          Vital Signs    Pre Systolic BP Rehab 161  -MB     Pre Treatment Diastolic   -MB     Pre Patient Position Supine   -MB     Intra Patient Position Standing  -MB     Post Patient Position Sitting  -MB     Row Name 11/07/22 1335          Positioning and Restraints    Pre-Treatment Position in bed  -MB     Post Treatment Position chair  -MB     In Chair notified nsg;reclined;call light within reach;encouraged to call for assist;exit alarm on;with family/caregiver;on mechanical lift sling;waffle cushion;legs elevated;heels elevated  -MB           User Key  (r) = Recorded By, (t) = Taken By, (c) = Cosigned By    Initials Name Provider Type    Soo Ybarra, PT Physical Therapist               Outcome Measures     Row Name 11/07/22 1347          How much help from another person do you currently need...    Turning from your back to your side while in flat bed without using bedrails? 4  -MB     Moving from lying on back to sitting on the side of a flat bed without bedrails? 3  -MB     Moving to and from a bed to a chair (including a wheelchair)? 2  -MB     Standing up from a chair using your arms (e.g., wheelchair, bedside chair)? 3  -MB     Climbing 3-5 steps with a railing? 2  -MB     To walk in hospital room? 3  -MB     AM-PAC 6 Clicks Score (PT) 17  -MB     Highest level of mobility 5 --> Static standing  -MB     Row Name 11/07/22 1347 11/07/22 1129       Functional Assessment    Outcome Measure Options AM-PAC 6 Clicks Basic Mobility (PT)  -MB AM-PAC 6 Clicks Daily Activity (OT)  -JY          User Key  (r) = Recorded By, (t) = Taken By, (c) = Cosigned By    Initials Name Provider Type    Soo Ybarra, PT Physical Therapist    Jenniffer De Jesus, OT Occupational Therapist                             Physical Therapy Education     Title: PT OT SLP Therapies (In Progress)     Topic: Physical Therapy (Resolved)     Point: Mobility training (Resolved)     Learning Progress Summary           Patient LEVON [place] VU by PEG at 10/26/2022 0813    LEVON Graff VU by SC at 10/26/2022 1543    Comment: reviewed HEp    Acceptance,  E, VU by  at 10/25/2022 2209    Acceptance, E, NR by SC at 10/25/2022 1633    Comment: reviewed benefits of sitting up    Acceptance, E, VU by  at 10/22/2022 2204    Acceptance, E, NL by SC at 10/19/2022 1512    Comment: reviewed benefits of actiity                   Point: Home exercise program (Resolved)     Learning Progress Summary           Patient Acceptance, E, VU by  at 10/26/2022 2333    Eager, E, VU by SC at 10/26/2022 1543    Comment: reviewed HEp    Acceptance, E, VU by  at 10/25/2022 2209    Acceptance, E, NR by SC at 10/25/2022 1633    Comment: reviewed benefits of sitting up    Acceptance, E, VU by  at 10/22/2022 2204    Acceptance, E, NL by SC at 10/19/2022 1512    Comment: reviewed benefits of actiity                   Point: Body mechanics (Resolved)     Learning Progress Summary           Patient Acceptance, E, VU by  at 10/26/2022 2333    Eager, E, VU by SC at 10/26/2022 1543    Comment: reviewed HEp    Acceptance, E, VU by  at 10/25/2022 2209    Acceptance, E, NR by SC at 10/25/2022 1633    Comment: reviewed benefits of sitting up    Acceptance, E, VU by  at 10/22/2022 2204    Acceptance, E, NL by SC at 10/19/2022 1512    Comment: reviewed benefits of actiity                   Point: Precautions (Resolved)     Learning Progress Summary           Patient Acceptance, E, VU by  at 10/26/2022 2333    Eager, E, VU by SC at 10/26/2022 1543    Comment: reviewed HEp    Acceptance, E, VU by  at 10/25/2022 2209    Acceptance, E, NR by SC at 10/25/2022 1633    Comment: reviewed benefits of sitting up    Acceptance, E, VU by  at 10/22/2022 2204    Acceptance, E, NL by SC at 10/19/2022 1512    Comment: reviewed benefits of actiity                               User Key     Initials Effective Dates Name Provider Type Discipline    SC 06/16/21 -  Efra Cristobal PT Physical Therapist PT     09/22/22 -  Augusta Singleton RN Registered Nurse Nurse              PT Recommendation and  Plan  Planned Therapy Interventions (PT): balance training, bed mobility training, gait training, home exercise program, postural re-education, patient/family education, strengthening, transfer training  Plan of Care Reviewed With: patient, daughter  Progress: improving  Outcome Evaluation: PT re-eval completed w/ increased LLE weight-bearing status to heel WBAT in forefoot offloading shoe. Patient demonstrated improved ability to follow commands and participate in therapy. He continues to be limited by weakness, impaired balance, and gait instability. Patient completed bed mobility w/ CGA, transfers w/ mod A x 2 improving to min A x 2, and ambulated 15ft w/ RW and min A x 2. PT goals updated to reflect current level of function. PT continues to recommend SNF rehab at D/C.     Time Calculation:    PT Charges     Row Name 11/07/22 1349 11/07/22 1347          Time Calculation    Start Time 0922  -MB 0922  -MB     PT Received On 11/07/22  -MB 11/07/22  -MB     PT Goal Re-Cert Due Date 11/17/22  -MB 11/17/22  -MB        Time Calculation- PT    Total Timed Code Minutes- PT 25 minute(s)  -MB 15 minute(s)  -MB        Timed Charges    13050 - PT Therapeutic Exercise Minutes 15  -MB --     73807 - Gait Training Minutes  10  -MB --        Untimed Charges    PT Eval/Re-eval Minutes -- 30  -MB        Total Minutes    Timed Charges Total Minutes 25  -MB --     Untimed Charges Total Minutes -- 30  -MB      Total Minutes 25  -MB 30  -MB           User Key  (r) = Recorded By, (t) = Taken By, (c) = Cosigned By    Initials Name Provider Type    Soo Ybarra, PT Physical Therapist              Therapy Charges for Today     Code Description Service Date Service Provider Modifiers Qty    75503619149 HC PT RE-EVAL ESTABLISHED PLAN 2 11/7/2022 Soo Lane, PT GP 1    [id number]  PT THER PROC EA 15 MIN 11/7/2022 Soo Lane, PT GP 1    [id number]  GAIT TRAINING EA 15 MIN 11/7/2022 Soo Lane, PT GP  1          PT G-Codes  Outcome Measure Options: AM-PAC 6 Clicks Basic Mobility (PT)  AM-PAC 6 Clicks Score (PT): 17  AM-PAC 6 Clicks Score (OT): 12    Soo Lane, PT  11/7/2022

## 2022-11-07 NOTE — PLAN OF CARE
Goal Outcome Evaluation:  Plan of Care Reviewed With: patient, daughter        Progress: improving  Outcome Evaluation: OT re-eval completed this date s/p upgraded change in pt weight bearing status per ortho to heel WBAT in forfoot offloading shoe. Pt more attentive, oriented and engaged in OT session this date yet still presents w/ decreased functional endurance, impaired balance, muscle weakness at BUEs, safety awareness deficits and fatigue with more dynamic tasks. Pt completed STS at recliner with min A x 2 + 1 for equipment mgmt w/ FWW support with cues for posture, weight shiftng, safety and hand placement with improvement noted in this stand compared to earlier from EOB set up. Pt completed d/d gown w/ min to mod A, dep care for d/d socks and offloading shoe, spv and set up A for hand and face washing, max to dep care for toileting w/ urinal use however from upgraded standing position. Pt presented w/ R lateral lean when standing and posterior lean when stepping backward however improved with cues and the sustained FWW support. Introduced pt to hand exercises for strengthening and joint mobilty after observed diff with grasp of fxl items and known weakness. Provided pt w/ blue foam block and supplemental HEP and educated pt and family present. Pt will need further training for TE. Graded goals as necessary, continue to recommend SNF at d/c when medically ready.

## 2022-11-07 NOTE — PROGRESS NOTES
INFECTIOUS DISEASE Progress Note    Omkar Nava  1955  [id number]    Consult: 10/15/22  Admit date: 10/14/2022    Requesting Provider: Omkar Seth MD  Evaluating physician:  Rayray Gordon MD  Reason for Consultation: left foot osteomyelitis, first toe, distal phalanx  Chief Complaint: left foot pain      Subjective   History of present illness:  Patient is a  67 y.o. male with h/o T2DM, PN, HTN, normocytic anemia, and GERD who presented to [facility] ED on 10/14 for worsening left foot pain.  The patient is a poor historian and most of the information was taken from the chart.  He tripped at his nursing facility, [facility], while running the street in flip-flops and sustained a laceration of his first webspace.  He was taken to [facility] ED on 9/17/22 and found to have a small intra-articular vertical fracture through the base the the great toe proximal phalanx. Podiatry, Dr. Ruiz, saw patient and the wound with irrigated with suture placement.  The area was dressed and he was placed in a surgical shoe.  He was given Cefazolin x 1 dose and discharged back to his facility on Keflex for 7 days.  On 9/26/22, the nursing staff at increased pain, swelling, and redness around the foot and sent him to [facility] ED on that day.  He was found to have a displaced fracture of proximal phalanx of left hallux along with abscess and gas in tissues.  He was admitted to the hospital and underwent Left foot debridement and I and D on 9/27/22 by Dr. Ruiz with culture positive for MRSA (Resistant to Cefazolin, clindamycin, erm, oxacillin, tetracycline, Bactrim and sensitive to Vancomycin, Daptomycin-KB 1, and Linezolid KB 2), Corynebacterium striatum, and Enterococcus faecalis (sens to Vanc and ampicillin).  He underwent a second debridement and I and D on 9/30.  He was given Cefazolin in ED and then changed to Daptomycin.  A PICC line was placed on 10/3 for Daptomycin infusions,  but the antibiotic was too expensive for the SNF, so he was changed to oral Zyvox and Rifampin until 10/26.  The PICC line was removed on 10/4.  His blood cultures remained negative.  He was discharged back to his nursing facility on 10/4/22.      He was sent to [facility] ED on 10/14 after nursing staff noted that his left hallux appeared necrotic.  He has had no fever, chills, shortness of breath, nausea, vomiting, diarrhea, dysuria, or worsening foot pain.  On arrival, he is afebrile and hemodynamically stable.  On 10/15, his BP went up to 203/81.  Admitting labs were WBC 7200 with 69% neutrophils, ESR 67, CRP 3.07, Hgb 9.2, PCT 0.05, lactic acid 2.3, and creatinine 1.21.  A MRSA PCR was negative.  Blood and wound cultures are pending.  An MRI of the left foot on 10/15 showed wound around the left great toe with marrow signal alteration within the distal aspect of 1st MT as well as both phalanges c/w osteomyelitis and scattered foci in soft tissues that may be foci of gas without evidence of fluid collections or abscesses. An Xray of left foot showed soft tissue swelling of 1st digit with displaced fracture of medial base of 1st proximal phalanx.  He is currently on Cefepime and Vancomycin.  ID was asked by Dr. Seth on 10/15 to evaluate and manage his antibiotic therapy.  Patient has had some issues with psychosis over the past 6 months possibly related to drug and alcohol use.  This is also interfered with his acceptance of care.    10/16/2022 history reviewed.  Patient more pleasant this morning.  No high fevers or chills.  Tolerating vancomycin and cefepime, duration to be determined.  Orthopedics following.  Previous history of MRSA.    10/17/22: history reviewed.  Patient with minimal response. Tmax 99.6.  On Vancomycin and Cefepime for left hallux osteomyelitis and surrounding infection with duration to be determined.  Awaiting evaluate by Dr. Petty as per Dr. Lieberman's note.  More cooperative.    10/18/2022 history  reviewed.  Patient awaiting a left hallux ray amputation by orthopedic surgery.  Tolerating vancomycin and cefepime.    10/19/2022 history reviewed.  The patient underwent a first toe ray resection on his left foot by Dr. Yeison Petty on 10/18/2022.  Continues on vancomycin and cefepime until 11/25/2022.  No high fevers or chills.    10/20/2022 history reviewed.  Status post left foot first ray toe resection on 10/18, with previous history of MRSA, and new infection with Raoultella ornithinolytica.  Tolerating vancomycin and ceftriaxone until 11/25/2022.  Waiting on placement.    10/24/2022 history reviewed.  No high fevers.  Status post left foot first ray toe resection on 10/18, with previous history of MRSA, and new infection with Raoultella ornithinolytica.  Continues on ceftriaxone and vancomycin until 11/25/2022.    10/25/2022 history reviewed.  Status post left foot first toe resection 10/18/2022 with cultures previously positive for MRSA, and now for Klebsiella and Raoultella.  Mental status has been a limiting factor with his psychiatric history in terms of treatment.  He is tolerating vancomycin and ceftriaxone to continue until 11/25/2022.  Placement is in progress.    10/26/2022 history reviewed.  Status post left first toe resection 10/18/2022 for osteomyelitis.  Tolerating ceftriaxone and vancomycin treating MRSA, Klebsiella, and other bacteria until 11/25/2022.  Awaiting placement, especially given his mental status and psychiatric history.  No high fever.  Pain controlled.    10/27/2022 history reviewed.  Continues on vancomycin and ceftriaxone until 11/25/2022 for left first toe osteomyelitis.  Status post resection 10/18/2022.  Awaiting placement.  No high fever.    10/31/2022 history reviewed.  Continues on antibiotics for left first toe osteomyelitis until 11/25.  Status post resection 10/18.  Confused off and on.  Awaiting placement.  No fever.    11/1/2022 history reviewed.  Tolerating  ceftriaxone and vancomycin until 11/25/2022 for left first toe osteomyelitis status post resection 10/18.  Pleasant but confused.  Awaiting placement.  No fever.  No pain.    11/2/2022 history reviewed.  On vancomycin and ceftriaxone until 11/25 for left toe osteomyelitis status post resection 10/18/2022.  Placement has been difficult as places or refusing him.  No fever.    11/3/2022 history reviewed.  Tolerating ceftriaxone and vancomycin until 11/25/2022 for left first toe osteomyelitis polymicrobial status post resection 10/18/2022.  Still a difficult placement issue.  No fever.    11/4/2022 history reviewed.  Continues on vancomycin and ceftriaxone until 11/25 for left first toe osteomyelitis polymicrobial, status post amputation and resection 10/18/2022.  More amendable to care.  No fever.  Waiting on placement.    11/7/2022 history reviewed.  Tolerating ceftriaxone and vancomycin until 11/25 for left first toe osteomyelitis status post resection 10/18.  No high fever.    Past Medical History:   Diagnosis Date   • Diabetes mellitus (HCC)    • GERD (gastroesophageal reflux disease)    HTN  Psychotic d/o, unspecified.    Past Surgical History:   Procedure Laterality Date   • AMPUTATION FOOT Left 10/18/2022    Procedure: PARTIAL FIRST RAY AMPUTATION LEFT;  Surgeon: Yeison Petty MD;  Location: [facility];  Service: Orthopedics;  Laterality: Left;   • EYE SURGERY     Left foot debridement/I and D x 2 9/27/22 and 9/30/22.  Left first ray amputation 10/18/2022.    Pediatric History   Patient Parents   • Not on file     Other Topics Concern   • Not on file   Social History Narrative    Caffeine 0-1 servings per day    Patient lives at home .   Patient [facility] at [facility]  Former smoker, no alcohol, smokes marijuana.     family history includes Diabetes in his brother, brother, father, maternal grandfather, maternal grandmother, mother, and sister; Hypertension in his brother, brother, father, and  mother.    No Known Allergies      There is no immunization history on file for this patient.    Medication:    Current Facility-Administered Medications:   •  acetaminophen (TYLENOL) tablet 650 mg, 650 mg, Oral, Q4H PRN, 650 mg at 22 1448 **OR** acetaminophen (TYLENOL) suppository 650 mg, 650 mg, Rectal, Q4H PRN, Yeison Petty MD  •  bisacodyl (DULCOLAX) suppository 10 mg, 10 mg, Rectal, Daily PRN, Yeison Petty MD  •  cefTRIAXone (ROCEPHIN) 2 g/100 mL 0.9% NS IVPB (MBP), 2 g, Intravenous, Q24H, Hero Ty, [facility], Last Rate: 200 mL/hr at 10/28/22 1109, 2 g at 22 1112  •  dextrose (D50W) (25 g/50 mL) IV injection 25 g, 25 g, Intravenous, Q15 Min PRN, Yeison ePtty MD  •  dextrose (GLUTOSE) oral gel 15 g, 15 g, Oral, Q15 Min PRN, Yeison Petty MD  •  ferrous sulfate tablet 325 mg, 325 mg, Oral, Daily With Breakfast, Yeison Petty MD, 325 mg at 22 0835  •  glipizide (GLUCOTROL) tablet 2.5 mg, 2.5 mg, Oral, BID AC, Elena Ugalde PA-C  •  glucagon (human recombinant) (GLUCAGEN DIAGNOSTIC) injection 1 mg, 1 mg, Intramuscular, Q15 Min PRN, Yeison Petty MD  •  heparin (porcine) 5000 UNIT/ML injection 5,000 Units, 5,000 Units, Subcutaneous, Q8H, Yeison Petty MD, 5,000 Units at 22 0512  •  lisinopril (PRINIVIL,ZESTRIL) tablet 40 mg, 40 mg, Oral, Daily, Shahbaz Kuhn MD, 40 mg at 22 0834  •  magnesium sulfate 4 gram infusion - Mg less than or equal to 1mg/dL, 4 g, Intravenous, PRN **OR** magnesium sulfate 3 gram infusion (1gm x 3) - Mg 1.1 - 1.5 mg/dL, 1 g, Intravenous, PRN **OR** Magnesium Sulfate 2 gram infusion- Mg 1.6 - 1.9 mg/dL, 2 g, Intravenous, PRN, Yeison Petty MD  •  melatonin tablet 5 mg, 5 mg, Oral, Nightly PRN, Yeison Petty MD, 5 mg at 22  •  metFORMIN (GLUCOPHAGE) tablet 1,000 mg, 1,000 mg, Oral, BID With Meals, Elena Ugalde PA-C, 1,000 mg at 22 0834  •  [] HYDROmorphone (DILAUDID) injection 0.5 mg, 0.5 mg, Intravenous, Q2H  "PRN, 0.5 mg at 10/24/22 1449 **AND** naloxone (NARCAN) injection 0.1 mg, 0.1 mg, Intravenous, Q5 Min PRN, Yeison Petty MD  •  NIFEdipine XL (PROCARDIA XL) 24 hr tablet 30 mg, 30 mg, Oral, Q24H, Elena Ugalde PA-C, 30 mg at 11/07/22 0842  •  OLANZapine (zyPREXA) tablet 5 mg, 5 mg, Oral, Daily, 5 mg at 11/07/22 0833 **AND** OLANZapine (zyPREXA) tablet 10 mg, 10 mg, Oral, Nightly, Nicole Henriquez DO, 10 mg at 11/06/22 2212  •  polyethylene glycol (MIRALAX) packet 17 g, 17 g, Oral, PRN, Yeison Petty MD, 17 g at 11/01/22 0825  •  sennosides-docusate (PERICOLACE) 8.6-50 MG per tablet 2 tablet, 2 tablet, Oral, BID, Elena Ugalde PA-C, 2 tablet at 11/07/22 0834  •  Insert peripheral IV, , , Once **AND** sodium chloride 0.9 % flush 10 mL, 10 mL, Intravenous, PRN, Yeison Petty MD, 10 mL at 11/07/22 0435  •  sodium chloride 0.9 % flush 10 mL, 10 mL, Intravenous, Q12H, Nicole Henriquez DO, 10 mL at 11/07/22 0837  •  vancomycin (dosing per levels), , Does not apply, Daily, Hero Ty [facility]  •  vancomycin in dextrose 5% 150 mL (VANCOCIN) IVPB 750 mg, 750 mg, Intravenous, Q12H, Hero Ty [facility]    Please refer to the medical record for a full medication list    Review of Systems:  Unable to get a reliable history as patient is responding with yes and no to basic questions and has a psychotic disorder.  Denies fever, chills, nausea, vomiting, diarrhea, shortness of breath, dysuria, or rashes.    Physical Exam:   Vital Signs   Temp:  [97.6 °F (36.4 °C)-98.9 °F (37.2 °C)] 97.7 °F (36.5 °C)  Heart Rate:  [72-86] 77  Resp:  [16-18] 16  BP: (142-161)/() 145/69    Temp  Min: 97.6 °F (36.4 °C)  Max: 98.9 °F (37.2 °C)  BP  Min: 142/64  Max: 161/106  Pulse  Min: 72  Max: 86  Resp  Min: 16  Max: 18  SpO2  Min: 94 %  Max: 98 %    Blood pressure 145/69, pulse 77, temperature 97.7 °F (36.5 °C), temperature source Oral, resp. rate 16, height 182.9 cm (72\"), weight 87.4 kg (192 lb 9.6 oz), SpO2 94 %.  GENERAL: " Awake and alert, in minimal distress. Appears older than stated age.  Resting in bed.  Watching TV.  HEENT:  Normocephalic, atraumatic.  Oropharynx without thrush.   EYES: No conjunctival injection. No icterus. EOM full.  LYMPHATICS: No lymphadenopathy of the neck or axillary or inguinal regions.   HEART: No murmur, gallop, or pericardial friction rub. Reg rate rhythm.  No JVD.  LUNGS: Clear to auscultation and percussion. No respiratory distress, no use of accessory muscles.    ABDOMEN: Soft, nontender, nondistended. No appreciable HSM.  Bowel sounds normal.  SKIN: Warm and dry without cutaneous eruptions.  No nodules. Left foot dressing in place, right arm PICC okay placed 10/27.  Splint in place.  PSYCHIATRIC: Mental status with occasional confusion.  But overall pleasant.  Can follow commands at times.    EXT: Left foot surgical dressing in place.  No crepitus, some drainage from webspace. Normal range of motion.  NEURO: Oriented to name, nonfocal.  Follows some commands.    Results Review:   I reviewed the patient's new clinical results.  I reviewed the patient's new imaging results and agree with the interpretation.  I reviewed the patient's other test results and agree with the interpretation    Results from last 7 days   Lab Units 11/05/22  1111 11/02/22  0844   WBC 10*3/mm3 5.79 6.07   HEMOGLOBIN g/dL 7.9* 7.7*   HEMATOCRIT % 26.4* 25.4*   PLATELETS 10*3/mm3 388 296     Results from last 7 days   Lab Units 11/04/22  0417   SODIUM mmol/L 141   POTASSIUM mmol/L 4.4   CHLORIDE mmol/L 105   CO2 mmol/L 28.0   BUN mg/dL 19   CREATININE mg/dL 0.92   GLUCOSE mg/dL 105*   CALCIUM mg/dL 9.2                 Results from last 7 days   Lab Units 10/31/22  1250   VANCOMYCIN RM mcg/mL 21.20         Estimated Creatinine Clearance: 99 mL/min (by C-G formula based on SCr of 0.92 mg/dL).  CPK    Common Labsle 10/19/22   Creatine Kinase 119            Procalitonin Results:       Brief Urine Lab Results  (Last result in the  past 365 days)      Color   Clarity   Blood   Leuk Est   Nitrite   Protein   CREAT   Urine HCG        06/12/22 2056 Yellow   Clear     Negative                    No results found for: SITE, ALLENTEST, PHART, CLS4XDJ, PO2ART, GMF6GWK, BASEEXCESS, V3NZBGQV, HGBBG, HCTABG, OXYHEMOGLOBI, METHHGBN, CARBOXYHGB, CO2CT, BAROMETRIC, MODALITY, FIO2     Microbiology:  Microbiology Results (last 10 days)     ** No results found for the last 240 hours. **        Blood and wound cultures 10/14 pending.       No results found for: TISSCXQ, CULTURES, CULTURE, BLOODCX, ANACX, BALCX, ACIDFASTCX, BODYFLDCX, CXREFLEX, FUNGUSCX, RESPCX, MRSACX, ROUTCX, BRCHWSHCLT, TISSUECX, URINECX, CMVCX, WOUNDCX, BCIDPCR, BFCULTURE, BLOODCULT(      Radiology:  Imaging Results (Last 72 Hours)     ** No results found for the last 72 hours. **          IMPRESSION:   1. Left hallux osteomyelitis after trauma/displaced fracture 9/17.  At risk for fracture not healing in the first toe given the infection, noncompliance with walking on foot, and ongoing infection.  Likely gangrene and poor wound healing related to vascular disease.  Making progress after surgery 10/18.  2. 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  Cx with MRSA, Enterococcus faecalis, and Corynebacterium striatum.  Initially treated with Daptomycin and discharge on oral Zyvox and oral Rifampin until 10/26/22.  Cx with Raoultella ornithinolytica (sens to Cefepime, ceftriaxone) and Klebsiella.  Status post left first ray resection 10/18/2022.  Healing.  3. Lactic acidosis, related to above. Resolved.   4. Elevated inflammatory markers, related to above.  CRP 3.07 on 10/14.  CRP 3.10 on 10/26.  5. Type 2 diabetes mellitus/peripheral neuropathy.  Affects wound healing.  6. Hyperglycemia, related to above.  7. Anemia of chronic disease.  Ongoing.  8. Essential hypertension.  Controlled.  9. Gastroesophageal reflux  disease.  10. Thrombocytosis.  Related to above issues.  Resolved.  11. Psychosis since April 2022 reported by the patient's sister, thought to be related to drug and alcohol use.  Was hospitalized previously at [facility].  Awaiting placement.  12. Hyponatremia, resolved.  13. H/o MRSA.  14. Hypocalcemia resolved.  15. Elevated alkaline phosphatase resolved.    Tolerating current regimen.  Discussed with nursing and family.  Main issue is finding placement at this point.    Plan:  1. Diagnostically, follow blood and wound cultures, imaging, physical examination, CBC, CMP, CRP, and vancomycin levels.   2. Therapeutically, continue ceftriaxone and Vancomycin for 6 weeks of IV antibiotics until 11/25/22.  This will cover the organisms including previous MRSA.  Vancomycin dose previously increased to 1 g IV every 12 hours.  3. Orthopedic surgery status post left first ray resection for osteomyelitis 10/18/2022.   4. Awaiting placement.  5. Continue supportive care.  PICC placement right arm 10/27/2022.    I discussed the patient's findings and my recommendations with patient and his family.  He will need to be in a skilled facility to receive his antibiotics.    Our group would be pleased to follow this patient over the course of their hospitalization and assist with outpatient antimicrobial therapy, as indicated.  Further recommendations depend on the results of the cultures and clinical course. Discussed with patient's sister and family.  Difficult issue with compliance in terms of wound care and staying off his foot.      Case management orders: Please arrange for skilled facility IV antibiotics with ceftriaxone 2 g IV daily, vancomycin 1 g IV every 12 hours to continue until 11/25/2022.  Pharmacy to adjust dose of vancomycin based on weekly trough levels to try to maintain level between 12 and 18.  Check CBC, CMP, CRP, vancomycin trough weekly while on IV antibiotics.  Fax orders to [phone], call [phone]  with final arrangements.  The treatment is for left first toe osteomyelitis with MRSA, Klebsiella, and Raoultella ornithinolytica.  Arrange for follow-up with me in 2 weeks post discharge.  Weekly PICC dressing changes.    Rayray Gordon MD  11/7/2022

## 2022-11-07 NOTE — PROGRESS NOTES
"          Orthopaedic Surgery Progress Note    LOS: 24 days   Patient Care Team:  Provider, No Known as PCP - General  POD 20 Days Post-Op   Procedure(s):  PARTIAL FIRST RAY AMPUTATION LEFT  Subjective   Interval History:   Resting in bed, answering questions, pain well controlled, no new complaints    Objective     Vital Signs:  Temp (24hrs), Av.1 °F (36.7 °C), Min:97.6 °F (36.4 °C), Max:98.9 °F (37.2 °C)    /69   Pulse 77   Temp 97.7 °F (36.5 °C) (Oral)   Resp 16   Ht 182.9 cm (72\")   Wt 87.4 kg (192 lb 9.6 oz)   SpO2 94%   BMI 26.12 kg/m²     Labs:  Lab Results (last 24 hours)     Procedure Component Value Units Date/Time    POC Glucose Once [[id number]]  (Abnormal) Collected: 22 0720    Specimen: Blood Updated: 22 0722     Glucose 172 mg/dL      Comment: Meter: [id number] : [id number] Vannesa Villanueva       POC Glucose Once [[id number]]  (Abnormal) Collected: 22 1619    Specimen: Blood Updated: 22 1621     Glucose 184 mg/dL      Comment: Meter: [id number] : [id number] [employer]       POC Glucose Once [[id number]]  (Abnormal) Collected: 22 1140    Specimen: Blood Updated: 22 1141     Glucose 182 mg/dL      Comment: Meter: [id number] : [id number] Josse Macias             Physical Exam:  left LE: Leg compartments soft/compressible              Splint and dressing removed for exam              Incision clean dry and intact, sutures in place, no drainage, some evidence of necrosis at the skin edges at the incision distally, margin of necrosis stable (see new photo in chart)              Lesser toes are warm and well perfused, palpable DP pulse    Assessment & Plan   -Splint removed , incision CDI with sutures in place  -Patient can be heel weightbearing as tolerated in forefoot offloading shoe on operative extremity  -DVT prophylaxis, mechanical and subq hep, rec DC home on  Daily (x30 days)  -Antibiotics per ID recommendations  -Pain " control  -Elevate operative extremity  -Okay for DC from ortho standpoint, f/u in clinic next week for wound check/suture removal (week of 11/13)  -Rest of management per primary team    Yeison Petty MD  11/07/22  10:03 EST

## 2022-11-07 NOTE — THERAPY RE-EVALUATION
Patient Name: Omkar Nava  : 1955    MRN: [id number]                              Today's Date: 2022       Admit Date: 10/14/2022    Visit Dx:     ICD-10-CM ICD-9-CM   1. Osteomyelitis of toe of left foot (HCC)  M86.9 730.27   2. Anemia, unspecified type  D64.9 285.9   3. Hyperglycemia  R73.9 790.29   4. 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, without long-term current use of insulin   • Orthostatic hypotension   • Acute osteomyelitis of left foot (HCC)   • Type 2 diabetes mellitus (HCC)   • Essential hypertension   • Psychotic disorder (HCC)   • Neurocognitive disorder     Past Medical History:   Diagnosis Date   • Diabetes mellitus (HCC)    • GERD (gastroesophageal reflux disease)      Past Surgical History:   Procedure Laterality Date   • AMPUTATION FOOT Left 10/18/2022    Procedure: PARTIAL FIRST RAY AMPUTATION LEFT;  Surgeon: Yeison Petty MD;  Location: [facility];  Service: Orthopedics;  Laterality: Left;   • EYE SURGERY        General Information     Row Name 22 1039          OT Time and Intention    Document Type re-evaluation  -JY     Mode of Treatment occupational therapy  -JY     Row Name 22 1039          General Information    Patient Profile Reviewed yes  -JY     Prior Level of Function --  refer to OT IE  -JY     Existing Precautions/Restrictions fall;left;weight bearing;other (see comments)  per ortho 22: pt heel WBAT while in forefoot offloading shoe at E, baseline vision deficits, dementia  -JY     Barriers to Rehab visual deficit;cognitive status  -JY     Row Name 22 1039          Occupational Profile    Environmental Supports and Barriers (Occupational Profile) refer to OT IE for specifics  -JY     Row Name 22 1039          Living  Environment    People in Home facility resident;other (see comments)  per chart review, refer to OT IE  -SANDRA     Row Name 11/07/22 1039          Home Main Entrance    Number of Stairs, Main Entrance none  -JY     Stair Railings, Main Entrance none  -JY     Row Name 11/07/22 1039          Stairs Within Home, Primary    Number of Stairs, Within Home, Primary none  -JY     Stair Railings, Within Home, Primary none  -JY     Row Name 11/07/22 1039          Cognition    Orientation Status (Cognition) oriented to;person;place;disoriented to;time  -JY     Row Name 11/07/22 1039          Safety Issues, Functional Mobility    Safety Issues Affecting Function (Mobility) awareness of need for assistance;insight into deficits/self-awareness;safety precaution awareness;safety precautions follow-through/compliance;sequencing abilities;positioning of assistive device  -SANDRA     Impairments Affecting Function (Mobility) balance;cognition;coordination;endurance/activity tolerance;motor control;postural/trunk control;strength;visual/perceptual  -SANDRA     Cognitive Impairments, Mobility Safety/Performance insight into deficits/self-awareness;problem-solving/reasoning;safety precaution awareness;safety precaution follow-through;sequencing abilities  -JY     Comment, Safety Issues/Impairments (Mobility) pt more alert and participatory in progressive mobility this date, ortho changed weight bearing status at LLE prior to OT session to heel WBAT in forefoot offloading shoe; pt denied any pain and tolerated weight bearing LLE with cues for more optimal midline orientation with decreased weight bearing at R side; improved upright posture with cues and stood more easily with BUE support and said cues  -SANDRA           User Key  (r) = Recorded By, (t) = Taken By, (c) = Cosigned By    Initials Name Provider Type    Jenniffer De Jesus OT Occupational Therapist                 Mobility/ADL's     Row Name 11/07/22 1046          Bed Mobility    Bed  Mobility supine-sit;scooting/bridging  -JY     Scooting/Bridging [place] (Bed Mobility) contact guard;1 person assist;verbal cues  -JY     Supine-Sit [place] (Bed Mobility) contact guard;1 person assist;verbal cues  -JY     Assistive Device (Bed Mobility) head of bed elevated;bed rails  -JY     Comment, (Bed Mobility) gross CGA for supine > sitting and scooting to EOB with HOB elevated and bed rails available for use, cues for advancing LEs to EOB and uprighting trunk into sitting with increased time to locate tactile landmarks for support in movement d/t vision deficits  -JY     Row Name 11/07/22 1046          Transfers    Transfers sit-stand transfer;stand-sit transfer  -JY     Comment, (Transfers) skilled cues for optimal hand placement for controlled ascend and descend specifically to push up from seated surface Rosio and then transition to FWW support and to reach back prior to sitting with resumption of B hands on seated surface, postural cues for upright standing once upright and weight shift A to come to midline vs incresaed R lateral lean or posterior lean; pt will fore foot offloading shoe to LLE as indicated and another shoe donned to R foot for more symmetry in standing  -JSARAH     Row Name 11/07/22 1046          Sit-Stand Transfer    Sit-Stand [place] (Transfers) minimum assist (75% patient effort);2 person assist;1 person to manage equipment;verbal cues  -SANDRA     Row Name 11/07/22 1046          Stand-Sit Transfer    Stand-Sit [place] (Transfers) minimum assist (75% patient effort);2 person assist;1 person to manage equipment;verbal cues  -JSARAH     Assistive Device (Stand-Sit Transfers) walker, front-wheeled  -SANDRA     Row Name 11/07/22 1046          Functional Mobility    Functional Mobility- Comment defer to PT for specifics; pt demonstrated improved alertness and engagement toward progressive mobility; ortho changed weight bearing status prior to OT arrival as indicated in precautions  -JSARAH      Row Name 11/07/22 1046          Activities of Daily Living    BADL Assessment/Intervention upper body dressing;grooming;lower body dressing;toileting  -JY     Row Name 11/07/22 1046          Mobility    Extremity Weight-bearing Status left lower extremity  -JY     Left Lower Extremity (Weight-bearing Status) weight-bearing as tolerated (WBAT);other (see comments)   per ortho as of 11/7/22 heel WBAT in forfront offloading shoe  -JY     Row Name 11/07/22 1046          Upper Body Dressing Assessment/Training    [place] Level (Upper Body Dressing) doff;don;pajama/robe;minimum assist (75% patient effort);moderate assist (50% patient effort);verbal cues  -JY     Position (Upper Body Dressing) supported sitting;edge of bed sitting  -JY     Comment, (Upper Body Dressing) min to mod A for posterior and proximal mgmt of gown when donned like jacket, cues for optimal seq and safety around IV  -JY     Row Name 11/07/22 1046          Lower Body Dressing Assessment/Training    [place] Level (Lower Body Dressing) doff;don;socks;dependent (less than 25% patient effort)  -JY     Position (Lower Body Dressing) supine  -JY     Comment, (Lower Body Dressing) dep for socks and offloading shoe to L foot. later for 2nd shoe at R foot for improved symmetry when standing  -JY     Row Name 11/07/22 1046          Grooming Assessment/Training    [place] Level (Grooming) wash face, hands;supervision;set up  -JY     Position (Grooming) supported sitting  -JY     Row Name 11/07/22 1046          Toileting Assessment/Training    [place] Level (Toileting) adjust/manage clothing;maximum assist (25% patient effort);perform perineal hygiene;dependent (less than 25% patient effort);verbal cues  -JY     Assistive Devices (Toileting) urinal  -JY     Position (Toileting) supported standing  -JY     Comment, (Toileting) pt verbalized urgent need to complete voiding while standing thus urinal provided; pt req'd max A for gown mgmt to  "access area for placement of urinal, dependent for holding of urinal while pt stood with unilateral and later recommended bilateral UE support; pt presented with lateral lean when not grasping FWW with B hands and req'd greater postural cues for stability  -Y           User Key  (r) = Recorded By, (t) = Taken By, (c) = Cosigned By    Initials Name Provider Type    Jenniffer De Jesus OT Occupational Therapist               Obj/Interventions     Row Name 11/07/22 1109          Vision Assessment/Intervention    Visual Impairment/Limitations visual/perceptual impairments present;other (see comments)  pt presents with visual defiicts at baseline, reportedly only seeing \"shadows\" however during assessment indicated seeing colors of objects  -     Row Name 11/07/22 1109          Range of Motion Comprehensive    General Range of Motion bilateral upper extremity ROM WFL  -     Row Name 11/07/22 1109          Strength Comprehensive (MMT)    General Manual Muscle Testing (MMT) Assessment upper extremity strength deficits identified  -     Comment, General Manual Muscle Testing (MMT) Assessment gross MMS at BUEs based on components of MMT and observation grossly 4/5  -     Row Name 11/07/22 1109          Hand (Therapeutic Exercise)    Hand (Therapeutic Exercise) strengthening exercise  -     Hand Strengthening (Therapeutic Exercise) bilateral;finger flexion;finger extension; strengthening;thumb opposition;thumb flexion;thumb extension;intrinsic strengthening;other (see comments);5 repetitions  blue foam block  -     Row Name 11/07/22 1109          Motor Skills    Motor Skills functional endurance  -JY     Functional Endurance decreased activity tolerance toward more dynamic tasks with fatigue noted after fxl standing and ADLs; pt less lethargic this date  -JY     Therapeutic Exercise hand;other (see comments)  initiated training with pt and family on hand, digit exercises w/ functional prehension patterns noted " w/ blue sponge resistance; 5 reps for competency check  -JY     Row Name 11/07/22 1109          Balance    Balance Assessment sitting static balance;sitting dynamic balance;standing static balance;standing dynamic balance  -JY     Static Sitting Balance contact guard;verbal cues  -JY     Dynamic Sitting Balance minimal assist;verbal cues;other (see comments)  lateral and posterior lean w/ more dynamic tasks w/ UE reach away from ANGELICA  -JY     Position, Sitting Balance supported;sitting edge of bed  -JY     Static Standing Balance minimal assist;2-person assist;1 person to manage equipment;verbal cues  -JY     Dynamic Standing Balance moderate assist;2-person assist;verbal cues  -JY     Position/Device Used, Standing Balance supported;walker, front-wheeled  -JY     Balance Interventions sitting;standing;static;dynamic;sit to stand;supported;occupation based/functional task  -JY     Comment, Balance improved standing posture with cues for retracted shoulders and decreased hip flexion; presented with lateral lean to R side in standing (improved with cues) and posterior lean when stepping backward toward chair  -JY           User Key  (r) = Recorded By, (t) = Taken By, (c) = Cosigned By    Initials Name Provider Type    Jenniffer De Jesus OT Occupational Therapist               Goals/Plan     Row Name 11/07/22 1126          Bed Mobility Goal 1 (OT)    Activity/Assistive Device (Bed Mobility Goal 1, OT) rolling to left;rolling to right;sit to supine;supine to sit  -JY     [place] Level/Cues Needed (Bed Mobility Goal 1, OT) standby assist;verbal cues required  -JY     Time Frame (Bed Mobility Goal 1, OT) long term goal (LTG);by discharge  -JY     Progress/Outcomes (Bed Mobility Goal 1, OT) good progress toward goal;goal revised this date;goal ongoing  -JY     Row Name 11/07/22 1126          Dressing Goal 1 (OT)    Activity/Device (Dressing Goal 1, OT) upper body dressing;lower body dressing  -JY     [place]/Cues  Needed (Dressing Goal 1, OT) minimum assist (75% or more patient effort);maximum assist (25-49% patient effort);verbal cues required  UBD with min A, max A for LBD  -JY     Time Frame (Dressing Goal 1, OT) long term goal (LTG);by discharge  -JY     Progress/Outcome (Dressing Goal 1, OT) goal ongoing  -JY     Row Name 11/07/22 1126          Grooming Goal 1 (OT)    Activity/Device (Grooming Goal 1, OT) hair care;wash face, hands;oral care  -JY     [place] (Grooming Goal 1, OT) contact guard required;verbal cues required  -JY     Time Frame (Grooming Goal 1, OT) long term goal (LTG);by discharge  -JY     Progress/Outcome (Grooming Goal 1, OT) good progress toward goal;goal revised this date;goal ongoing  -JY     Row Name 11/07/22 1126          Strength Goal 1 (OT)    Strength Goal 1 (OT) Pt to complete seated HEP consistent with pt's physical and cognitive needs encompassing BUEs focused on strength and endurance w/ progressive sets/reps/resistance in order to progress integration in ADLs, related t/fs  -JY     Time Frame (Strength Goal 1, OT) long term goal (LTG);by discharge  -JY     Progress/Outcome (Strength Goal 1, OT) goal ongoing  -     Row Name 11/07/22 1126          Therapy Assessment/Plan (OT)    Planned Therapy Interventions (OT) activity tolerance training;adaptive equipment training;BADL retraining;functional balance retraining;occupation/activity based interventions;patient/caregiver education/training;ROM/therapeutic exercise;strengthening exercise;transfer/mobility retraining  -JY           User Key  (r) = Recorded By, (t) = Taken By, (c) = Cosigned By    Initials Name Provider Type    Jenniffer De Jesus, OT Occupational Therapist               Clinical Impression     Row Name 11/07/22 1114          Pain Assessment    Pretreatment Pain Rating 0/10 - no pain  -JY     Posttreatment Pain Rating 0/10 - no pain  -JY     Pre/Posttreatment Pain Comment denies any pain and tolerated all OT interventions   -JY     Pain Intervention(s) Repositioned;Ambulation/increased activity  -SANDRA Gannon Name 11/07/22 1114          Plan of Care Review    Plan of Care Reviewed With patient;daughter  -SANDRA     Progress improving  -JY     Outcome Evaluation OT re-eval completed this date s/p upgraded change in pt weight bearing status per ortho to heel WBAT in forfoot offloading shoe. Pt more attentive, oriented and engaged in OT session this date yet still presents w/ decreased functional endurance, impaired balance, muscle weakness at BUEs, safety awareness deficits and fatigue with more dynamic tasks. Pt completed STS at recliner with min A x 2 + 1 for equipment mgmt w/ FWW support with cues for posture, weight shiftng, safety and hand placement with improvement noted in this stand compared to earlier from EOB set up. Pt completed d/d gown w/ min to mod A, dep care for d/d socks and offloading shoe, spv and set up A for hand and face washing, max to dep care for toileting w/ urinal use however from upgraded standing position. Pt presented w/ R lateral lean when standing and posterior lean when stepping backward however improved with cues and the sustained FWW support. Introduced pt to hand exercises for strengthening and joint mobilty after observed diff with grasp of fxl items and known weakness. Provided pt w/ blue foam block and supplemental HEP and educated pt and family present. Pt will need further training for TE. Graded goals as necessary, continue to recommend SNF at d/c when medically ready.  -SANDRA     Row Name 11/07/22 1114          Therapy Assessment/Plan (OT)    Patient/Family Therapy Goal Statement (OT) to maximize I in ADLs, related t/fs, return to PLOF  -JY     Rehab Potential (OT) good, to achieve stated therapy goals  -JY     Criteria for Skilled Therapeutic Interventions Met (OT) yes;skilled treatment is necessary  -JY     Therapy Frequency (OT) daily  -SANDRA     Row Name 11/07/22 1114          Therapy Plan  Review/Discharge Plan (OT)    Anticipated Discharge Disposition (OT) skilled nursing facility  -SANDRA     Row Name 11/07/22 1114          Vital Signs    Pre Systolic BP Rehab 145  -JY     Pre Treatment Diastolic BP 69  -JY     Pretreatment Heart Rate (beats/min) 75  -JY     Posttreatment Heart Rate (beats/min) 77  -JY     Pre SpO2 (%) 95  -JY     O2 Delivery Pre Treatment room air  -JY     O2 Delivery Intra Treatment room air  -JY     Post SpO2 (%) 95  -JY     O2 Delivery Post Treatment room air  -JY     Pre Patient Position Sitting  -JY     Intra Patient Position Standing  -JY     Post Patient Position Sitting  -JY     Row Name 11/07/22 1114          Positioning and Restraints    Pre-Treatment Position sitting in chair/recliner  -JY     Post Treatment Position chair  -JY     In Chair notified nsg;reclined;call light within reach;encouraged to call for assist;exit alarm on;with family/caregiver;waffle cushion;on mechanical lift sling;legs elevated;heels elevated  -JY           User Key  (r) = Recorded By, (t) = Taken By, (c) = Cosigned By    Initials Name Provider Type    Jenniffer De Jesus OT Occupational Therapist               Outcome Measures     Row Name 11/07/22 1129          How much help from another is currently needed...    Putting on and taking off regular lower body clothing? 1  -JY     Bathing (including washing, rinsing, and drying) 1  -JY     Toileting (which includes using toilet bed pan or urinal) 1  -JY     Putting on and taking off regular upper body clothing 3  -JY     Taking care of personal grooming (such as brushing teeth) 3  -JY     Eating meals 3  -JY     AM-PAC 6 Clicks Score (OT) 12  -JY     Row Name 11/07/22 1129          Functional Assessment    Outcome Measure Options AM-PAC 6 Clicks Daily Activity (OT)  -JY           User Key  (r) = Recorded By, (t) = Taken By, (c) = Cosigned By    Initials Name Provider Type    Jenniffer De Jesus OT Occupational Therapist                Occupational  Therapy Education     Title: PT OT SLP Therapies (In Progress)     Topic: Occupational Therapy (In Progress)     Point: ADL training (In Progress)     Description:   Instruct learner(s) on proper safety adaptation and remediation techniques during self care or transfers.   Instruct in proper use of assistive devices.              Learning Progress Summary           Patient Acceptance, E,D, NR by SANDRA at 11/7/2022 0946    Acceptance, E,D, NR by SANDRA at 11/2/2022 1505    Acceptance, E, VU by PEG at 10/26/2022 2333    Acceptance, E, VU by PEG at 10/25/2022 2209    Acceptance, E,D, NR by SANDRA at 10/24/2022 1340    Acceptance, E, VU by PEG at 10/22/2022 2204    Acceptance, E,D, NR by SANDRA at 10/19/2022 1445   Family Acceptance, E,D, NR by SANDRA at 11/7/2022 0946                   Point: Home exercise program (In Progress)     Description:   Instruct learner(s) on appropriate technique for monitoring, assisting and/or progressing therapeutic exercises/activities.              Learning Progress Summary           Patient Acceptance, E,D, NR by SANDRA at 11/7/2022 0946    Acceptance, E, VU by PEG at 10/26/2022 2333    Acceptance, E, VU by PEG at 10/25/2022 2209    Acceptance, E, VU by PEG at 10/22/2022 2204   Family Acceptance, E,D, NR by SANDRA at 11/7/2022 0946                   Point: Precautions (In Progress)     Description:   Instruct learner(s) on prescribed precautions during self-care and functional transfers.              Learning Progress Summary           Patient Acceptance, E,D, NR by SANDRA at 11/7/2022 0946    Acceptance, E,D, NR by SANDRA at 11/2/2022 1505    Acceptance, E, VU by PEG at 10/26/2022 2333    Acceptance, E, VU by PEG at 10/25/2022 2209    Acceptance, E,D, NR by SANDRA at 10/24/2022 1340    Acceptance, E, VU by PEG at 10/22/2022 2204    Acceptance, E,D, NR by SANDRA at 10/19/2022 1445   Family Acceptance, E,D, NR by SANDRA at 11/7/2022 0946                   Point: Body mechanics (In Progress)     Description:   Instruct learner(s) on proper  positioning and spine alignment during self-care, functional mobility activities and/or exercises.              Learning Progress Summary           Patient Acceptance, E,D, NR by SANDRA at 11/7/2022 0946    Acceptance, E,D, NR by SANDRA at 11/2/2022 1505    Acceptance, E, VU by PGE at 10/26/2022 2333    Acceptance, E, VU by PEG at 10/25/2022 2209    Acceptance, E,D, NR by SANDRA at 10/24/2022 1340    Acceptance, E, VU by PEG at 10/22/2022 2204    Acceptance, E,D, NR by SANDRA at 10/19/2022 1445   Family Acceptance, E,D, NR by SANDRA at 11/7/2022 0946                               User Key     Initials Effective Dates Name Provider Type Discipline    SANDRA 06/16/21 -  Jenniffer Lee OT Occupational Therapist OT     09/22/22 -  Augusta Singleton RN Registered Nurse Nurse              OT Recommendation and Plan  Planned Therapy Interventions (OT): activity tolerance training, adaptive equipment training, BADL retraining, functional balance retraining, occupation/activity based interventions, patient/caregiver education/training, ROM/therapeutic exercise, strengthening exercise, transfer/mobility retraining  Therapy Frequency (OT): daily  Plan of Care Review  Plan of Care Reviewed With: patient, daughter  Progress: improving  Outcome Evaluation: OT re-eval completed this date s/p upgraded change in pt weight bearing status per ortho to heel WBAT in forfoot offloading shoe. Pt more attentive, oriented and engaged in OT session this date yet still presents w/ decreased functional endurance, impaired balance, muscle weakness at BUEs, safety awareness deficits and fatigue with more dynamic tasks. Pt completed STS at recliner with min A x 2 + 1 for equipment mgmt w/ FWW support with cues for posture, weight shiftng, safety and hand placement with improvement noted in this stand compared to earlier from EOB set up. Pt completed d/d gown w/ min to mod A, dep care for d/d socks and offloading shoe, spv and set up A for hand and face washing, max to  dep care for toileting w/ urinal use however from upgraded standing position. Pt presented w/ R lateral lean when standing and posterior lean when stepping backward however improved with cues and the sustained FWW support. Introduced pt to hand exercises for strengthening and joint mobilty after observed diff with grasp of fxl items and known weakness. Provided pt w/ blue foam block and supplemental HEP and educated pt and family present. Pt will need further training for TE. Graded goals as necessary, continue to recommend SNF at d/c when medically ready.     Time Calculation:    Time Calculation- OT     Row Name 11/07/22 1130             Time Calculation- OT    OT Start Time 0946  -JY      OT Received On 11/07/22  -JY      OT Goal Re-Cert Due Date 11/17/22  -JY         Timed Charges    53844 - OT Therapeutic Exercise Minutes 12  -JY      39360 - OT Therapeutic Activity Minutes 8  -JY      05953 - OT Self Care/Mgmt Minutes 8  -JY         Untimed Charges    OT Eval/Re-eval Minutes 30  -JY         Total Minutes    Timed Charges Total Minutes 28  -JY      Untimed Charges Total Minutes 30  -JY       Total Minutes 58  -JY            User Key  (r) = Recorded By, (t) = Taken By, (c) = Cosigned By    Initials Name Provider Type    Jenniffer De Jesus OT Occupational Therapist              Therapy Charges for Today     Code Description Service Date Service Provider Modifiers Qty    70373172857 HC OT THER PROC EA 15 MIN 11/7/2022 Jenniffer Lee OT GO 1    18780549033 HC OT SELF CARE/MGMT/TRAIN EA 15 MIN 11/7/2022 Jenniffer Lee OT GO 1    48702843122 HC OT RE-EVAL 2 11/7/2022 Jenniffer Lee OT GO 1               Jenniffer Lee OT  11/7/2022

## 2022-11-07 NOTE — CASE MANAGEMENT/SOCIAL WORK
Continued Stay Note  [facility]     Patient Name: Omkar Nava  MRN: [id number]  Today's Date: 11/7/2022    Admit Date: 10/14/2022    Plan: [facility] [place] [facility]   Discharge Plan     Row Name 11/07/22 0935       Plan    Plan [facility] [place] [facility]    Patient/Family in Agreement with Plan yes    Plan Comments Spoke with patient at bedside. Plan is [facility] [place]. Updated notes faxed to Teresa. CM will continue to follow.    Final Discharge Disposition Code 03 - skilled nursing facility (SNF)               Discharge Codes    No documentation.               Expected Discharge Date and Time     Expected Discharge Date Expected Discharge Time    Nov 11, 2022             Hardik Bang RN

## 2022-11-08 ENCOUNTER — TELEPHONE (OUTPATIENT)
Dept: ORTHOPEDIC SURGERY | Facility: CLINIC | Age: 67
End: 2022-11-08

## 2022-11-08 LAB
ALBUMIN SERPL-MCNC: 3.4 G/DL (ref 3.5–5.2)
ALBUMIN/GLOB SERPL: 0.9 G/DL
ALP SERPL-CCNC: 111 U/L (ref 39–117)
ALT SERPL W P-5'-P-CCNC: 20 U/L (ref 1–41)
ANION GAP SERPL CALCULATED.3IONS-SCNC: 8 MMOL/L (ref 5–15)
AST SERPL-CCNC: 14 U/L (ref 1–40)
BASOPHILS # BLD AUTO: 0.06 10*3/MM3 (ref 0–0.2)
BASOPHILS NFR BLD AUTO: 0.7 % (ref 0–1.5)
BILIRUB SERPL-MCNC: <0.2 MG/DL (ref 0–1.2)
BUN SERPL-MCNC: 23 MG/DL (ref 8–23)
BUN/CREAT SERPL: 23.7 (ref 7–25)
CALCIUM SPEC-SCNC: 9.2 MG/DL (ref 8.6–10.5)
CHLORIDE SERPL-SCNC: 103 MMOL/L (ref 98–107)
CO2 SERPL-SCNC: 27 MMOL/L (ref 22–29)
CREAT SERPL-MCNC: 0.97 MG/DL (ref 0.76–1.27)
DEPRECATED RDW RBC AUTO: 54.9 FL (ref 37–54)
EGFRCR SERPLBLD CKD-EPI 2021: 86.6 ML/MIN/1.73
EOSINOPHIL # BLD AUTO: 0.2 10*3/MM3 (ref 0–0.4)
EOSINOPHIL NFR BLD AUTO: 2.3 % (ref 0.3–6.2)
ERYTHROCYTE [DISTWIDTH] IN BLOOD BY AUTOMATED COUNT: 14.5 % (ref 12.3–15.4)
GLOBULIN UR ELPH-MCNC: 3.7 GM/DL
GLUCOSE BLDC GLUCOMTR-MCNC: 110 MG/DL (ref 70–130)
GLUCOSE BLDC GLUCOMTR-MCNC: 116 MG/DL (ref 70–130)
GLUCOSE BLDC GLUCOMTR-MCNC: 123 MG/DL (ref 70–130)
GLUCOSE BLDC GLUCOMTR-MCNC: 285 MG/DL (ref 70–130)
GLUCOSE SERPL-MCNC: 143 MG/DL (ref 65–99)
HCT VFR BLD AUTO: 32.7 % (ref 37.5–51)
HGB BLD-MCNC: 8.8 G/DL (ref 13–17.7)
IMM GRANULOCYTES # BLD AUTO: 0.04 10*3/MM3 (ref 0–0.05)
IMM GRANULOCYTES NFR BLD AUTO: 0.5 % (ref 0–0.5)
LYMPHOCYTES # BLD AUTO: 1.35 10*3/MM3 (ref 0.7–3.1)
LYMPHOCYTES NFR BLD AUTO: 15.5 % (ref 19.6–45.3)
MCH RBC QN AUTO: 27.9 PG (ref 26.6–33)
MCHC RBC AUTO-ENTMCNC: 26.9 G/DL (ref 31.5–35.7)
MCV RBC AUTO: 103.8 FL (ref 79–97)
MONOCYTES # BLD AUTO: 0.57 10*3/MM3 (ref 0.1–0.9)
MONOCYTES NFR BLD AUTO: 6.5 % (ref 5–12)
NEUTROPHILS NFR BLD AUTO: 6.49 10*3/MM3 (ref 1.7–7)
NEUTROPHILS NFR BLD AUTO: 74.5 % (ref 42.7–76)
NRBC BLD AUTO-RTO: 0 /100 WBC (ref 0–0.2)
PLATELET # BLD AUTO: 410 10*3/MM3 (ref 140–450)
PMV BLD AUTO: 10.1 FL (ref 6–12)
POTASSIUM SERPL-SCNC: 4.3 MMOL/L (ref 3.5–5.2)
PROT SERPL-MCNC: 7.1 G/DL (ref 6–8.5)
RBC # BLD AUTO: 3.15 10*6/MM3 (ref 4.14–5.8)
SODIUM SERPL-SCNC: 138 MMOL/L (ref 136–145)
WBC NRBC COR # BLD: 8.71 10*3/MM3 (ref 3.4–10.8)

## 2022-11-08 PROCEDURE — 97110 THERAPEUTIC EXERCISES: CPT

## 2022-11-08 PROCEDURE — 25010000002 VANCOMYCIN PER 500 MG

## 2022-11-08 PROCEDURE — 25010000002 HEPARIN (PORCINE) PER 1000 UNITS: Performed by: ORTHOPAEDIC SURGERY

## 2022-11-08 PROCEDURE — 85025 COMPLETE CBC W/AUTO DIFF WBC: CPT | Performed by: INTERNAL MEDICINE

## 2022-11-08 PROCEDURE — 82962 GLUCOSE BLOOD TEST: CPT

## 2022-11-08 PROCEDURE — 99231 SBSQ HOSP IP/OBS SF/LOW 25: CPT | Performed by: PHYSICIAN ASSISTANT

## 2022-11-08 PROCEDURE — 80053 COMPREHEN METABOLIC PANEL: CPT | Performed by: INTERNAL MEDICINE

## 2022-11-08 PROCEDURE — 97530 THERAPEUTIC ACTIVITIES: CPT

## 2022-11-08 PROCEDURE — 25010000002 CEFTRIAXONE PER 250 MG

## 2022-11-08 PROCEDURE — 25010000002 LORAZEPAM PER 2 MG: Performed by: FAMILY MEDICINE

## 2022-11-08 RX ORDER — LORAZEPAM 2 MG/ML
0.5 INJECTION INTRAMUSCULAR ONCE
Status: COMPLETED | OUTPATIENT
Start: 2022-11-08 | End: 2022-11-08

## 2022-11-08 RX ORDER — OLANZAPINE 5 MG/1
5 TABLET, ORALLY DISINTEGRATING ORAL DAILY PRN
Status: DISCONTINUED | OUTPATIENT
Start: 2022-11-08 | End: 2022-11-16 | Stop reason: HOSPADM

## 2022-11-08 RX ADMIN — OLANZAPINE 10 MG: 5 TABLET, FILM COATED ORAL at 19:43

## 2022-11-08 RX ADMIN — GLIPIZIDE 2.5 MG: 5 TABLET ORAL at 17:38

## 2022-11-08 RX ADMIN — METFORMIN HYDROCHLORIDE 1000 MG: 1000 TABLET, FILM COATED ORAL at 17:38

## 2022-11-08 RX ADMIN — GLIPIZIDE 2.5 MG: 5 TABLET ORAL at 08:01

## 2022-11-08 RX ADMIN — OLANZAPINE 5 MG: 5 TABLET, FILM COATED ORAL at 08:05

## 2022-11-08 RX ADMIN — SENNOSIDES AND DOCUSATE SODIUM 2 TABLET: 50; 8.6 TABLET ORAL at 08:04

## 2022-11-08 RX ADMIN — METFORMIN HYDROCHLORIDE 1000 MG: 1000 TABLET, FILM COATED ORAL at 08:04

## 2022-11-08 RX ADMIN — POLYETHYLENE GLYCOL 3350 17 G: 17 POWDER, FOR SOLUTION ORAL at 08:02

## 2022-11-08 RX ADMIN — ACETAMINOPHEN 650 MG: 325 TABLET, FILM COATED ORAL at 01:41

## 2022-11-08 RX ADMIN — HEPARIN SODIUM 5000 UNITS: 5000 INJECTION INTRAVENOUS; SUBCUTANEOUS at 23:02

## 2022-11-08 RX ADMIN — Medication 5 MG: at 19:42

## 2022-11-08 RX ADMIN — HEPARIN SODIUM 5000 UNITS: 5000 INJECTION INTRAVENOUS; SUBCUTANEOUS at 14:23

## 2022-11-08 RX ADMIN — NIFEDIPINE 30 MG: 30 TABLET, FILM COATED, EXTENDED RELEASE ORAL at 08:04

## 2022-11-08 RX ADMIN — FERROUS SULFATE TAB 325 MG (65 MG ELEMENTAL FE) 325 MG: 325 (65 FE) TAB at 08:05

## 2022-11-08 RX ADMIN — LISINOPRIL 40 MG: 40 TABLET ORAL at 08:05

## 2022-11-08 RX ADMIN — SENNOSIDES AND DOCUSATE SODIUM 2 TABLET: 50; 8.6 TABLET ORAL at 19:41

## 2022-11-08 RX ADMIN — SODIUM CHLORIDE 2 G: 900 INJECTION INTRAVENOUS at 14:24

## 2022-11-08 RX ADMIN — VANCOMYCIN HYDROCHLORIDE 750 MG: 750 INJECTION, SOLUTION INTRAVENOUS at 02:49

## 2022-11-08 RX ADMIN — ACETAMINOPHEN 650 MG: 325 TABLET, FILM COATED ORAL at 19:42

## 2022-11-08 RX ADMIN — VANCOMYCIN HYDROCHLORIDE 750 MG: 750 INJECTION, SOLUTION INTRAVENOUS at 16:45

## 2022-11-08 RX ADMIN — Medication 10 ML: at 02:48

## 2022-11-08 RX ADMIN — Medication 10 ML: at 20:39

## 2022-11-08 RX ADMIN — Medication 5 MG: at 01:41

## 2022-11-08 RX ADMIN — LORAZEPAM 0.5 MG: 2 INJECTION INTRAMUSCULAR; INTRAVENOUS at 02:48

## 2022-11-08 NOTE — PROGRESS NOTES
INFECTIOUS DISEASE Progress Note    Omkar Nava  1955  [id number]    Consult: 10/15/22  Admit date: 10/14/2022    Requesting Provider: Omkar Seth MD  Evaluating physician:  Rayray Gordon MD  Reason for Consultation: left foot osteomyelitis, first toe, distal phalanx  Chief Complaint: left foot pain      Subjective   History of present illness:  Patient is a  67 y.o. male with h/o T2DM, PN, HTN, normocytic anemia, and GERD who presented to [facility] ED on 10/14 for worsening left foot pain.  The patient is a poor historian and most of the information was taken from the chart.  He tripped at his nursing facility, [facility], while running the street in flip-flops and sustained a laceration of his first webspace.  He was taken to [facility] ED on 9/17/22 and found to have a small intra-articular vertical fracture through the base the the great toe proximal phalanx. Podiatry, Dr. Ruiz, saw patient and the wound with irrigated with suture placement.  The area was dressed and he was placed in a surgical shoe.  He was given Cefazolin x 1 dose and discharged back to his facility on Keflex for 7 days.  On 9/26/22, the nursing staff at increased pain, swelling, and redness around the foot and sent him to [facility] ED on that day.  He was found to have a displaced fracture of proximal phalanx of left hallux along with abscess and gas in tissues.  He was admitted to the hospital and underwent Left foot debridement and I and D on 9/27/22 by Dr. Ruiz with culture positive for MRSA (Resistant to Cefazolin, clindamycin, erm, oxacillin, tetracycline, Bactrim and sensitive to Vancomycin, Daptomycin-KB 1, and Linezolid KB 2), Corynebacterium striatum, and Enterococcus faecalis (sens to Vanc and ampicillin).  He underwent a second debridement and I and D on 9/30.  He was given Cefazolin in ED and then changed to Daptomycin.  A PICC line was placed on 10/3 for Daptomycin infusions,  but the antibiotic was too expensive for the SNF, so he was changed to oral Zyvox and Rifampin until 10/26.  The PICC line was removed on 10/4.  His blood cultures remained negative.  He was discharged back to his nursing facility on 10/4/22.      He was sent to [facility] ED on 10/14 after nursing staff noted that his left hallux appeared necrotic.  He has had no fever, chills, shortness of breath, nausea, vomiting, diarrhea, dysuria, or worsening foot pain.  On arrival, he is afebrile and hemodynamically stable.  On 10/15, his BP went up to 203/81.  Admitting labs were WBC 7200 with 69% neutrophils, ESR 67, CRP 3.07, Hgb 9.2, PCT 0.05, lactic acid 2.3, and creatinine 1.21.  A MRSA PCR was negative.  Blood and wound cultures are pending.  An MRI of the left foot on 10/15 showed wound around the left great toe with marrow signal alteration within the distal aspect of 1st MT as well as both phalanges c/w osteomyelitis and scattered foci in soft tissues that may be foci of gas without evidence of fluid collections or abscesses. An Xray of left foot showed soft tissue swelling of 1st digit with displaced fracture of medial base of 1st proximal phalanx.  He is currently on Cefepime and Vancomycin.  ID was asked by Dr. Seth on 10/15 to evaluate and manage his antibiotic therapy.  Patient has had some issues with psychosis over the past 6 months possibly related to drug and alcohol use.  This is also interfered with his acceptance of care.    10/16/2022 history reviewed.  Patient more pleasant this morning.  No high fevers or chills.  Tolerating vancomycin and cefepime, duration to be determined.  Orthopedics following.  Previous history of MRSA.    10/17/22: history reviewed.  Patient with minimal response. Tmax 99.6.  On Vancomycin and Cefepime for left hallux osteomyelitis and surrounding infection with duration to be determined.  Awaiting evaluate by Dr. Petty as per Dr. Lieberman's note.  More cooperative.    10/18/2022 history  reviewed.  Patient awaiting a left hallux ray amputation by orthopedic surgery.  Tolerating vancomycin and cefepime.    10/19/2022 history reviewed.  The patient underwent a first toe ray resection on his left foot by Dr. Yeison Petty on 10/18/2022.  Continues on vancomycin and cefepime until 11/25/2022.  No high fevers or chills.    10/20/2022 history reviewed.  Status post left foot first ray toe resection on 10/18, with previous history of MRSA, and new infection with Raoultella ornithinolytica.  Tolerating vancomycin and ceftriaxone until 11/25/2022.  Waiting on placement.    10/24/2022 history reviewed.  No high fevers.  Status post left foot first ray toe resection on 10/18, with previous history of MRSA, and new infection with Raoultella ornithinolytica.  Continues on ceftriaxone and vancomycin until 11/25/2022.    10/25/2022 history reviewed.  Status post left foot first toe resection 10/18/2022 with cultures previously positive for MRSA, and now for Klebsiella and Raoultella.  Mental status has been a limiting factor with his psychiatric history in terms of treatment.  He is tolerating vancomycin and ceftriaxone to continue until 11/25/2022.  Placement is in progress.    10/26/2022 history reviewed.  Status post left first toe resection 10/18/2022 for osteomyelitis.  Tolerating ceftriaxone and vancomycin treating MRSA, Klebsiella, and other bacteria until 11/25/2022.  Awaiting placement, especially given his mental status and psychiatric history.  No high fever.  Pain controlled.    10/27/2022 history reviewed.  Continues on vancomycin and ceftriaxone until 11/25/2022 for left first toe osteomyelitis.  Status post resection 10/18/2022.  Awaiting placement.  No high fever.    10/31/2022 history reviewed.  Continues on antibiotics for left first toe osteomyelitis until 11/25.  Status post resection 10/18.  Confused off and on.  Awaiting placement.  No fever.    11/1/2022 history reviewed.  Tolerating  ceftriaxone and vancomycin until 11/25/2022 for left first toe osteomyelitis status post resection 10/18.  Pleasant but confused.  Awaiting placement.  No fever.  No pain.    11/2/2022 history reviewed.  On vancomycin and ceftriaxone until 11/25 for left toe osteomyelitis status post resection 10/18/2022.  Placement has been difficult as places or refusing him.  No fever.    11/3/2022 history reviewed.  Tolerating ceftriaxone and vancomycin until 11/25/2022 for left first toe osteomyelitis polymicrobial status post resection 10/18/2022.  Still a difficult placement issue.  No fever.    11/4/2022 history reviewed.  Continues on vancomycin and ceftriaxone until 11/25 for left first toe osteomyelitis polymicrobial, status post amputation and resection 10/18/2022.  More amendable to care.  No fever.  Waiting on placement.    11/7/2022 history reviewed.  Tolerating ceftriaxone and vancomycin until 11/25 for left first toe osteomyelitis status post resection 10/18.  No high fever.    11/8/2022 history reviewed.  Continues on antibiotics till 11/25 for left first toe osteomyelitis status post resection 10/18.  No fever.    Past Medical History:   Diagnosis Date   • Diabetes mellitus (HCC)    • GERD (gastroesophageal reflux disease)    HTN  Psychotic d/o, unspecified.    Past Surgical History:   Procedure Laterality Date   • AMPUTATION FOOT Left 10/18/2022    Procedure: PARTIAL FIRST RAY AMPUTATION LEFT;  Surgeon: Yeison Petty MD;  Location: [facility];  Service: Orthopedics;  Laterality: Left;   • EYE SURGERY     Left foot debridement/I and D x 2 9/27/22 and 9/30/22.  Left first ray amputation 10/18/2022.    Pediatric History   Patient Parents   • Not on file     Other Topics Concern   • Not on file   Social History Narrative    Caffeine 0-1 servings per day    Patient lives at home .   Patient [facility] at [facility]  Former smoker, no alcohol, smokes marijuana.     family history includes Diabetes in his  brother, brother, father, maternal grandfather, maternal grandmother, mother, and sister; Hypertension in his brother, brother, father, and mother.    No Known Allergies      There is no immunization history on file for this patient.    Medication:    Current Facility-Administered Medications:   •  acetaminophen (TYLENOL) tablet 650 mg, 650 mg, Oral, Q4H PRN, 650 mg at 11/08/22 0141 **OR** acetaminophen (TYLENOL) suppository 650 mg, 650 mg, Rectal, Q4H PRN, Yeison Petty MD  •  bisacodyl (DULCOLAX) suppository 10 mg, 10 mg, Rectal, Daily PRN, Yeison Petty MD  •  cefTRIAXone (ROCEPHIN) 2 g/100 mL 0.9% NS IVPB (MBP), 2 g, Intravenous, Q24H, Hero Ty, [facility], Last Rate: 200 mL/hr at 10/28/22 1109, 2 g at 11/07/22 1059  •  dextrose (D50W) (25 g/50 mL) IV injection 25 g, 25 g, Intravenous, Q15 Min PRN, Yeison Petty MD  •  dextrose (GLUTOSE) oral gel 15 g, 15 g, Oral, Q15 Min PRN, Yeison Petty MD  •  ferrous sulfate tablet 325 mg, 325 mg, Oral, Daily With Breakfast, Yeison Petty MD, 325 mg at 11/07/22 0835  •  glipizide (GLUCOTROL) tablet 2.5 mg, 2.5 mg, Oral, BID , Elena Ugalde PA-C, 2.5 mg at 11/07/22 1716  •  glucagon (human recombinant) (GLUCAGEN DIAGNOSTIC) injection 1 mg, 1 mg, Intramuscular, Q15 Min PRN, Yeison Petty MD  •  heparin (porcine) 5000 UNIT/ML injection 5,000 Units, 5,000 Units, Subcutaneous, Q8H, Yeison Petty MD, 5,000 Units at 11/07/22 2101  •  lisinopril (PRINIVIL,ZESTRIL) tablet 40 mg, 40 mg, Oral, Daily, Shahbaz Kuhn MD, 40 mg at 11/07/22 0834  •  magnesium sulfate 4 gram infusion - Mg less than or equal to 1mg/dL, 4 g, Intravenous, PRN **OR** magnesium sulfate 3 gram infusion (1gm x 3) - Mg 1.1 - 1.5 mg/dL, 1 g, Intravenous, PRN **OR** Magnesium Sulfate 2 gram infusion- Mg 1.6 - 1.9 mg/dL, 2 g, Intravenous, PRN, Yeison Petty MD  •  melatonin tablet 5 mg, 5 mg, Oral, Nightly PRN, Yeison Petty MD, 5 mg at 11/08/22 0141  •  metFORMIN (GLUCOPHAGE) tablet 1,000 mg,  1,000 mg, Oral, BID With Meals, Elena Ugalde PA-C, 1,000 mg at 22 1716  •  [] HYDROmorphone (DILAUDID) injection 0.5 mg, 0.5 mg, Intravenous, Q2H PRN, 0.5 mg at 10/24/22 1449 **AND** naloxone (NARCAN) injection 0.1 mg, 0.1 mg, Intravenous, Q5 Min PRN, Yeison Petty MD  •  NIFEdipine XL (PROCARDIA XL) 24 hr tablet 30 mg, 30 mg, Oral, Q24H, Elena Ugalde PA-C, 30 mg at 22 0842  •  OLANZapine (zyPREXA) tablet 5 mg, 5 mg, Oral, Daily, 5 mg at 22 0833 **AND** OLANZapine (zyPREXA) tablet 10 mg, 10 mg, Oral, Nightly, Nicole Henriquez DO, 10 mg at 22 2100  •  polyethylene glycol (MIRALAX) packet 17 g, 17 g, Oral, PRN, Yeison Petty MD, 17 g at 22 0825  •  sennosides-docusate (PERICOLACE) 8.6-50 MG per tablet 2 tablet, 2 tablet, Oral, BID, Elena Ugalde PA-C, 2 tablet at 22 2100  •  Insert peripheral IV, , , Once **AND** sodium chloride 0.9 % flush 10 mL, 10 mL, Intravenous, PRN, Yeisno Petty MD, 10 mL at 22 0435  •  sodium chloride 0.9 % flush 10 mL, 10 mL, Intravenous, Q12H, Nicole Henriquez DO, 10 mL at 22 0248  •  vancomycin (dosing per levels), , Does not apply, Daily, Hero Ty RPH  •  vancomycin in dextrose 5% 150 mL (VANCOCIN) IVPB 750 mg, 750 mg, Intravenous, Q12H, Hero Ty RPH, 750 mg at 22 0249    Please refer to the medical record for a full medication list    Review of Systems:  Unable to get a reliable history as patient is responding with yes and no to basic questions and has a psychotic disorder.  Denies fever, chills, nausea, vomiting, diarrhea, shortness of breath, dysuria, or rashes.    Physical Exam:   Vital Signs   Temp:  [98 °F (36.7 °C)-98.6 °F (37 °C)] 98.5 °F (36.9 °C)  Heart Rate:  [75-83] 78  Resp:  [16-18] 18  BP: (141-171)/(68-94) 154/68    Temp  Min: 98 °F (36.7 °C)  Max: 98.6 °F (37 °C)  BP  Min: 141/94  Max: 171/90  Pulse  Min: 75  Max: 83  Resp  Min: 16  Max: 18  SpO2  Min: 95 %  Max: 96  "%    Blood pressure 154/68, pulse 78, temperature 98.5 °F (36.9 °C), resp. rate 18, height 182.9 cm (72\"), weight 87.4 kg (192 lb 9.6 oz), SpO2 96 %.  GENERAL: Awake and alert, in minimal distress. Appears older than stated age.  Resting in bed.  Watching TV.  HEENT:  Normocephalic, atraumatic.  Oropharynx without thrush.   EYES: No conjunctival injection. No icterus. EOM full.  LYMPHATICS: No lymphadenopathy of the neck or axillary or inguinal regions.   HEART: No murmur, gallop, or pericardial friction rub. Reg rate rhythm.  No JVD.  LUNGS: Clear to auscultation and percussion. No respiratory distress, no use of accessory muscles.  No wheezes.  ABDOMEN: Soft, nontender, nondistended. No appreciable HSM.  Bowel sounds normal.  SKIN: Warm and dry without cutaneous eruptions.  No nodules. Left foot dressing in place, right arm PICC okay placed 10/27.  Splint in place.  PSYCHIATRIC: Mental status with occasional confusion.  But overall pleasant.  Can follow commands at times.    EXT: Left foot surgical dressing in place.  No crepitus, some drainage from webspace. Normal range of motion.  NEURO: Oriented to name, nonfocal.  Follows some commands.    Results Review:   I reviewed the patient's new clinical results.  I reviewed the patient's new imaging results and agree with the interpretation.  I reviewed the patient's other test results and agree with the interpretation    Results from last 7 days   Lab Units 11/08/22  0503 11/05/22  1111 11/02/22  0844   WBC 10*3/mm3 8.71 5.79 6.07   HEMOGLOBIN g/dL 8.8* 7.9* 7.7*   HEMATOCRIT % 32.7* 26.4* 25.4*   PLATELETS 10*3/mm3 410 388 296     Results from last 7 days   Lab Units 11/07/22  1127   SODIUM mmol/L 138   POTASSIUM mmol/L 4.3   CHLORIDE mmol/L 104   CO2 mmol/L 21.0*   BUN mg/dL 29*   CREATININE mg/dL 0.97   GLUCOSE mg/dL 181*   CALCIUM mg/dL 9.1                 Results from last 7 days   Lab Units 11/07/22  1127   VANCOMYCIN RM mcg/mL 24.60         Estimated " Creatinine Clearance: 93.9 mL/min (by C-G formula based on SCr of 0.97 mg/dL).  CPK    Common Labsle 10/19/22   Creatine Kinase 119            Procalitonin Results:       Brief Urine Lab Results  (Last result in the past 365 days)      Color   Clarity   Blood   Leuk Est   Nitrite   Protein   CREAT   Urine HCG        06/12/22 2056 Yellow   Clear     Negative                    No results found for: SITE, ALLENTEST, PHART, HLD5NVL, PO2ART, GEZ2BCF, BASEEXCESS, P3UOXKDE, HGBBG, HCTABG, OXYHEMOGLOBI, METHHGBN, CARBOXYHGB, CO2CT, BAROMETRIC, MODALITY, FIO2     Microbiology:  Microbiology Results (last 10 days)     ** No results found for the last 240 hours. **        Blood and wound cultures 10/14 pending.       No results found for: TISSCXQ, CULTURES, CULTURE, BLOODCX, ANACX, BALCX, ACIDFASTCX, BODYFLDCX, CXREFLEX, FUNGUSCX, RESPCX, MRSACX, ROUTCX, BRCHWSHCLT, TISSUECX, URINECX, CMVCX, WOUNDCX, BCIDPCR, BFCULTURE, BLOODCULT(      Radiology:  Imaging Results (Last 72 Hours)     ** No results found for the last 72 hours. **          IMPRESSION:   1. Left hallux osteomyelitis after trauma/displaced fracture 9/17.  At risk for fracture not healing in the first toe given the infection, noncompliance with walking on foot, and ongoing infection.  Likely gangrene and poor wound healing related to vascular disease.  Resolving after surgery 10/18.  2. 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  Cx with MRSA, Enterococcus faecalis, and Corynebacterium striatum.  Initially treated with Daptomycin and discharge on oral Zyvox and oral Rifampin until 10/26/22.  Cx with Raoultella ornithinolytica (sens to Cefepime, ceftriaxone) and Klebsiella.  Status post left first ray resection 10/18/2022.  Improved.  3. Lactic acidosis, related to above. Resolved.   4. Elevated inflammatory markers, related to above.  CRP 3.07 on 10/14.  CRP 3.10 on 10/26.  5. Type 2 diabetes mellitus/peripheral  neuropathy.  Affects wound healing.  6. Hyperglycemia, related to above.  7. Anemia of chronic disease.  Ongoing.  8. Essential hypertension.  Controlled.  9. Gastroesophageal reflux disease.  10. Thrombocytosis.  Related to above issues.  Resolved.  11. Psychosis since April 2022 reported by the patient's sister, thought to be related to drug and alcohol use.  Was hospitalized previously at [facility].  Awaiting placement.  12. Hyponatremia, resolved.  13. H/o MRSA.  14. Hypocalcemia resolved.  15. Elevated alkaline phosphatase resolved.    Moving forward on current regimen.  Discussed with nursing and family.  Main issue is finding placement at this point.    Plan:  1. Diagnostically, follow blood and wound cultures, imaging, physical examination, CBC, CMP, CRP, and vancomycin levels.   2. Therapeutically, continue ceftriaxone and Vancomycin for 6 weeks of IV antibiotics until 11/25/22.  This will cover the organisms including previous MRSA.  Vancomycin dose previously increased to 1 g IV every 12 hours.  3. Orthopedic surgery status post left first ray resection for osteomyelitis 10/18/2022.   4. Awaiting placement.  5. Continue supportive care.  PICC placement right arm 10/27/2022.    I discussed the patient's findings and my recommendations with patient and his family.  He will need to be in a skilled facility to receive his antibiotics.    Our group would be pleased to follow this patient over the course of their hospitalization and assist with outpatient antimicrobial therapy, as indicated.  Further recommendations depend on the results of the cultures and clinical course. Discussed with patient's sister and family.  Difficult issue with compliance in terms of wound care and staying off his foot.      Case management orders: Please arrange for skilled facility IV antibiotics with ceftriaxone 2 g IV daily, vancomycin 1 g IV every 12 hours to continue until 11/25/2022.  Pharmacy to adjust dose of  vancomycin based on weekly trough levels to try to maintain level between 12 and 18.  Check CBC, CMP, CRP, vancomycin trough weekly while on IV antibiotics.  Fax orders to [phone], call [phone] with final arrangements.  The treatment is for left first toe osteomyelitis with MRSA, Klebsiella, and Raoultella ornithinolytica.  Arrange for follow-up with me in 2 weeks post discharge.  Weekly PICC dressing changes.    Rayray Gordon MD  11/8/2022

## 2022-11-08 NOTE — PLAN OF CARE
Goal Outcome Evaluation: Alert to self only. Confused with hallucinations. VSS.  Attempted multiple time to get up from bed throughout shift. Ativan given with no noted reduction in agitation. Pt has not slept this shift. Awaiting placement.

## 2022-11-08 NOTE — PLAN OF CARE
Goal Outcome Evaluation:  Plan of Care Reviewed With: patient        Progress: no change  Outcome Evaluation: Patient with limited tolerance to activity, completed supine>sit with Min assist x1, sit>supine required max assist x2. Attempted to transfer to recliner but patient with increased confusion and inability to follow commands to safely transfer to recliner, nursing notified patient agitated and agressive towards staff and unable to transfer to recliner.

## 2022-11-08 NOTE — PROGRESS NOTES
"    [facility] Medicine Services  PROGRESS NOTE    Patient Name: Omkar Nava  : 1955  MRN: [id number]    Date of Admission: 10/14/2022  Primary Care Physician: Provider, No Known    Subjective     CC: L foot osteomyelitis     HPI:  Was restless and didn't sleep last night. At time of my exam, he was sitting in bed and trying to eat breakfast. I fed him oatmeal. He asked me when the bone doctor would see him again and I told him probably next week. He said he needed to come today and when I asked him what message I could pass on to the bone doctor, he said \"tell him how bad I've been.\" No other concerns from nursing staff.   ROS:  Gen- No fevers, chills  CV- No chest pain, palpitations  Resp- No cough, dyspnea  GI- No N/V/D, abd pain    Objective     Vital Signs:   Temp:  [98 °F (36.7 °C)-98.6 °F (37 °C)] 98.5 °F (36.9 °C)  Heart Rate:  [78] 78  Resp:  [16-18] 16  BP: (141-159)/(68-94) 159/77     Physical Exam:  Constitutional: No acute distress, awake, alert and conversant. Sitting up in bed   HENT: NCAT, mucous membranes moist  Respiratory: Clear to auscultation bilaterally, respiratory effort normal on room air  Cardiovascular: RRR, no murmurs, rubs, or gallops  Gastrointestinal: Positive bowel sounds, soft, nontender, nondistended  Musculoskeletal: L foot in splint - able to wiggle toes.   Psychiatric: Appropriate affect, cooperative  Neurologic: Moves all extremities spontaneously without focal deficits. Speech clear and coherent     Results Reviewed:  LAB RESULTS:      Lab 22  0503 22  1111 22  0844   WBC 8.71 5.79 6.07   HEMOGLOBIN 8.8* 7.9* 7.7*   HEMATOCRIT 32.7* 26.4* 25.4*   PLATELETS 410 388 296   NEUTROS ABS 6.49  --   --    IMMATURE GRANS (ABS) 0.04  --   --    LYMPHS ABS 1.35  --   --    MONOS ABS 0.57  --   --    EOS ABS 0.20  --   --    .8* 91.7 91.7         Lab 22  1012 22  1127 22  0417 22  0844   SODIUM 138 138 141 " 135*   POTASSIUM 4.3 4.3 4.4 4.1   CHLORIDE 103 104 105 101   CO2 27.0 21.0* 28.0 24.0   ANION GAP 8.0 13.0 8.0 10.0   BUN 23 29* 19 22   CREATININE 0.97 0.97 0.92 1.00   EGFR 86.6 86.6 92.3 83.5   GLUCOSE 143* 181* 105* 174*   CALCIUM 9.2 9.1 9.2 9.0         Lab 11/08/22  1012   TOTAL PROTEIN 7.1   ALBUMIN 3.40*   GLOBULIN 3.7   ALT (SGPT) 20   AST (SGOT) 14   BILIRUBIN <0.2   ALK PHOS 111     Brief Urine Lab Results  (Last result in the past 365 days)      Color   Clarity   Blood   Leuk Est   Nitrite   Protein   CREAT   Urine HCG        06/12/22 2056 Yellow   Clear     Negative                   Microbiology Results Abnormal     Procedure Component Value - Date/Time    Fungus Culture - Tissue, Toe, Left [[id number]] Collected: 10/18/22 1608    Lab Status: Preliminary result Specimen: Tissue from Toe, Left Updated: 11/01/22 1815     Fungus Culture No fungus isolated at 2 weeks    AFB Culture - Tissue, Toe, Left [[id number]] Collected: 10/18/22 1608    Lab Status: Preliminary result Specimen: Tissue from Toe, Left Updated: 11/01/22 1815     AFB Culture No AFB isolated at 2 weeks     AFB Stain No acid fast bacilli seen on concentrated smear    Fungus Culture - Tissue, Toe, Left [[id number]] Collected: 10/18/22 1552    Lab Status: Preliminary result Specimen: Tissue from Toe, Left Updated: 11/01/22 1801     Fungus Culture No fungus isolated at 2 weeks    AFB Culture - Tissue, Toe, Left [[id number]] Collected: 10/18/22 1552    Lab Status: Preliminary result Specimen: Tissue from Toe, Left Updated: 11/01/22 1801     AFB Culture No AFB isolated at 2 weeks     AFB Stain No acid fast bacilli seen on concentrated smear    COVID PRE-OP / PRE-PROCEDURE SCREENING ORDER (NO ISOLATION) - Swab, Nasopharynx [[id number]]  (Normal) Collected: 10/27/22 0510    Lab Status: Final result Specimen: Swab from Nasopharynx Updated: 10/27/22 0530    Narrative:      The following orders were created for panel order COVID PRE-OP / PRE-PROCEDURE  SCREENING ORDER (NO ISOLATION) - Swab, Nasopharynx.  Procedure                               Abnormality         Status                     ---------                               -----------         ------                     COVID-19, ABBOTT IN-HOUS...[[id number]]  Normal              Final result                 Please view results for these tests on the individual orders.    COVID-19, ABBOTT IN-HOUSE,NASAL Swab (NO TRANSPORT MEDIA) 2 HR TAT - Swab, Nasopharynx [[id number]]  (Normal) Collected: 10/27/22 0510    Lab Status: Final result Specimen: Swab from Nasopharynx Updated: 10/27/22 0530     COVID19 Presumptive Negative    Narrative:      Fact sheet for providers: https://www.fda.gov/media/317172/download     Fact sheet for patients: https://www.fda.gov/media/691385/download    Test performed by PCR.  If inconsistent with clinical signs and symptoms patient should be tested with different authorized molecular test.    Anaerobic Culture - Tissue, Toe, Left [[id number]]  (Normal) Collected: 10/18/22 1608    Lab Status: Final result Specimen: Tissue from Toe, Left Updated: 10/23/22 0543     Anaerobic Culture No anaerobes isolated at 5 days    Anaerobic Culture - Tissue, Toe, Left [[id number]]  (Normal) Collected: 10/18/22 1552    Lab Status: Final result Specimen: Tissue from Toe, Left Updated: 10/23/22 0543     Anaerobic Culture No anaerobes isolated at 5 days    Tissue / Bone Culture - Tissue, Toe, Left [[id number]] Collected: 10/18/22 1552    Lab Status: Final result Specimen: Tissue from Toe, Left Updated: 10/21/22 0840     Tissue Culture No growth at 3 days     Gram Stain Moderate (3+) WBCs seen      No organisms seen    Blood Culture - Blood, Hand, Left [[id number]]  (Normal) Collected: 10/14/22 2115    Lab Status: Final result Specimen: Blood from Hand, Left Updated: 10/19/22 2215     Blood Culture No growth at 5 days    Blood Culture - Blood, Arm, Left [[id number]]  (Normal) Collected: 10/14/22 2115    Lab  Status: Final result Specimen: Blood from Arm, Left Updated: 10/19/22 2215     Blood Culture No growth at 5 days    MRSA Screen, PCR (Inpatient) - Swab, Nares [[id number]]  (Normal) Collected: 10/14/22 2250    Lab Status: Final result Specimen: Swab from Nares Updated: 10/15/22 0763     MRSA PCR Negative    Narrative:      The negative predictive value of this diagnostic test is high and should only be used to consider de-escalating anti-MRSA therapy. A positive result may indicate colonization with MRSA and must be correlated clinically.  MRSA Negative        No radiology results from the last 24 hrs    I have reviewed the medications:  Scheduled Meds:cefTRIAXone, 2 g, Intravenous, Q24H  ferrous sulfate, 325 mg, Oral, Daily With Breakfast  glipizide, 2.5 mg, Oral, BID AC  heparin (porcine), 5,000 Units, Subcutaneous, Q8H  lisinopril, 40 mg, Oral, Daily  metFORMIN, 1,000 mg, Oral, BID With Meals  NIFEdipine XL, 30 mg, Oral, Q24H  OLANZapine, 5 mg, Oral, Daily   And  OLANZapine, 10 mg, Oral, Nightly  senna-docusate sodium, 2 tablet, Oral, BID  sodium chloride, 10 mL, Intravenous, Q12H  vancomycin (dosing per levels), , Does not apply, Daily  vancomycin, 750 mg, Intravenous, Q12H      Continuous Infusions:   PRN Meds:.•  acetaminophen **OR** acetaminophen  •  bisacodyl  •  dextrose  •  dextrose  •  glucagon (human recombinant)  •  magnesium sulfate **OR** magnesium sulfate in D5W 1g/100mL (PREMIX) **OR** magnesium sulfate  •  melatonin  •  [] HYDROmorphone **AND** naloxone  •  polyethylene glycol  •  Insert peripheral IV **AND** sodium chloride    Assessment & Plan     Active Hospital Problems    Diagnosis  POA   • **Acute osteomyelitis of left foot (HCC) [M86.172]  Yes   • Neurocognitive disorder [R41.9]  Unknown   • Type 2 diabetes mellitus (HCC) [E11.9]  Yes   • Essential hypertension [I10]  Yes   • Psychotic disorder (HCC) [F29]  Yes      Resolved Hospital Problems   No resolved problems to display.      Brief Hospital Course to date:  Omkar Nava is a 67 y.o. male with PMH significant for HTN, DMII and unspecified psychotic disorder. He previously worked as a  but has been unable to work for past 2-3 years and has become homeless.     His health declined in June 2022 with an acute psychotic event with subsequent psychiatric hospitalization at [facility] in [facility] from June to August 2022. He was subsequently transferred to a nursing facility in [place]. On 9/17/22, daughter visited him at the nursing home and he had a significant injury to his L great toe, causing an open articular fracture. He was admitted to [facility] and treated at that time with bedside flushing and repair by podiatry. He was given Ancef and sent back to nursing facility on Keflex. Wound cultures at that time grew MRSA, however blood culture showed no growth. On 9/27/2022, he presented to [facility] in [place] due to worsening foot wound and drainage. He underwent L foot I&D and debridement on 9/30/22. A PICC was placed on 10/3/22. He was to receive Daptomycin but due to expense, this was changed to Rifampin and Linezolid and PICC was removed. He was discharged to SNF in [facility] on 10/6/22 and at some point, was transferred to [place], in order to be closer to family. Daughter visited him on 10/14/22 and became concerned about the appearance of his toe. She brought him to [facility] ED for further evaluation. ID and orthopedic surgery follow.     Osteomyelitis of left foot / L first toe (s/p amputation)  Wound culture (+) Raoultella ornithinolytica   - 10/15/22 MRI of L foot showed wound around the great toe with geographic marrow signal alteration within the distal aspect of the first metatarsal as well as both phalanges of the great toe indicative of osteomyelitis  - He is s/p L great toe amputation on 10/18/22 by Dr. Petty  - Re-evaluated by Dr. Petty  on 11/7. Splint removed. Sutures remain in place. Heel-weightbearing on LLE in hard-soled shoe. Follow up with Dr. Petty in 1 week (~ 11/14/22)  - NWB to LLE. Elevated LLE. Keep splint clean and dry. SQ heparin for DVT PPX while inpatient. Discharge on ASA 325mg daily x 30 days  - ID following. PICC placed 10/27/22. Planning for IV Vancomycin / Rocephin through 11/25/22. Follow up with Dr. Gordon in 2 weeks.     Iron-deficiency anemia   - Completed 3 days IV iron on 10/20/22  - Hgb stable. Monitor periodically     Non-insulin dependent DMII  - Hgb A1c 7.0%   - Continue Metformin 1000mg BID, add Glipizide 2.5mg BID   - Continue TID accuchecks     Hypertension  - Stopped Amlodipine.   - Continue Nifedipine XL 30mg daily, continue Lisinopril 40mg PO daily     Neurocognitive disorder  Dementia   - Re-started olanzapine 10/15/22, was recently hospitalized at [facility] for psychiatric issues; apparently PRN Haldol was not much help, have discontinued  - Continue Olanzapine 5mg QAM and 10mg QHS  - Overall stable    Expected Discharge Location and Transportation: rehab with goal to transition to LTC. Private vehicle vs WC van   Expected Discharge Date: medically ready for DC anytime     DVT prophylaxis:Medical and mechanical DVT prophylaxis orders are present.     AM-PAC 6 Clicks Score (PT): 9 (11/08/22 1046)    CODE STATUS:   Code Status and Medical Interventions:   Ordered at: 10/14/22 4478     Level Of Support Discussed With:    Patient     Code Status (Patient has no pulse and is not breathing):    CPR (Attempt to Resuscitate)     Medical Interventions (Patient has pulse or is breathing):    Full Support     Elena Ugalde PA-C  11/08/22

## 2022-11-08 NOTE — THERAPY TREATMENT NOTE
Patient Name: Omkar Nava  : 1955    MRN: [id number]                              Today's Date: 2022       Admit Date: 10/14/2022    Visit Dx:     ICD-10-CM ICD-9-CM   1. Osteomyelitis of toe of left foot (HCC)  M86.9 730.27   2. Anemia, unspecified type  D64.9 285.9   3. Hyperglycemia  R73.9 790.29   4. 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, without long-term current use of insulin   • Orthostatic hypotension   • Acute osteomyelitis of left foot (HCC)   • Type 2 diabetes mellitus (HCC)   • Essential hypertension   • Psychotic disorder (HCC)   • Neurocognitive disorder     Past Medical History:   Diagnosis Date   • Diabetes mellitus (HCC)    • GERD (gastroesophageal reflux disease)      Past Surgical History:   Procedure Laterality Date   • AMPUTATION FOOT Left 10/18/2022    Procedure: PARTIAL FIRST RAY AMPUTATION LEFT;  Surgeon: Yeison Petty MD;  Location: [facility];  Service: Orthopedics;  Laterality: Left;   • EYE SURGERY        General Information     Row Name 22 103          Physical Therapy Time and Intention    Document Type therapy note (daily note)  -AS     Mode of Treatment physical therapy  -AS     Row Name 22 103          General Information    Patient Profile Reviewed yes  -AS     Existing Precautions/Restrictions fall;left;weight bearing;other (see comments)  per ortho 22 WBAT through heel with offloading shoe L LE, also has cast shoe for right, dementia, visual deficits  -AS     Barriers to Rehab visual deficit;cognitive status  -AS     Row Name 22 103          Cognition    Orientation Status (Cognition) oriented to;person;disoriented to;place;situation;time  -AS     Row Name 22 103          Safety Issues, Functional Mobility     Safety Issues Affecting Function (Mobility) awareness of need for assistance;insight into deficits/self-awareness;safety precaution awareness;safety precautions follow-through/compliance;sequencing abilities;ability to follow commands;at risk behavior observed  -AS     Impairments Affecting Function (Mobility) balance;cognition;coordination;endurance/activity tolerance;motor control;postural/trunk control;strength;visual/perceptual  -AS     Cognitive Impairments, Mobility Safety/Performance awareness, need for assistance;insight into deficits/self-awareness;judgment;problem-solving/reasoning;safety precaution awareness;safety precaution follow-through;sequencing abilities  -AS     Comment, Safety Issues/Impairments (Mobility) patient alert to self only, confused, agitated and combative during treatment  -AS           User Key  (r) = Recorded By, (t) = Taken By, (c) = Cosigned By    Initials Name Provider Type    AS Delia Covarrubias PTA Physical Therapist Assistant               Mobility     Row Name 11/08/22 1039          Bed Mobility    Supine-Sit [place] (Bed Mobility) minimum assist (75% patient effort);1 person assist  -AS     Sit-Supine [place] (Bed Mobility) maximum assist (25% patient effort);2 person assist  -AS     Assistive Device (Bed Mobility) head of bed elevated;bed rails  -AS     Comment, (Bed Mobility) patient needed increased time to completed bed mobility due to confusion and increased agitation  -AS     Row Name 11/08/22 1039          Transfers    Comment, (Transfers) unable to complete transfer to recliner due to agressive behavior and safety concerns  -AS     Row Name 11/08/22 1039          Bed-Chair Transfer    Bed-Chair [place] (Transfers) not tested  -AS     Row Name 11/08/22 1039          Sit-Stand Transfer    Sit-Stand [place] (Transfers) not tested  -AS     Row Name 11/08/22 1039          Gait/Stairs (Locomotion)    [place] Level (Gait) not tested  -AS     Row  Name 11/08/22 1039          Mobility    Extremity Weight-bearing Status left lower extremity  -AS     Left Lower Extremity (Weight-bearing Status) weight-bearing as tolerated (WBAT)  -AS           User Key  (r) = Recorded By, (t) = Taken By, (c) = Cosigned By    Initials Name Provider Type    AS Delia Covarrubias PTA Physical Therapist Assistant               Obj/Interventions     Row Name 11/08/22 1041          Motor Skills    Therapeutic Exercise --  deferred due to non-compliance and agressive behavior towards staff  -AS           User Key  (r) = Recorded By, (t) = Taken By, (c) = Cosigned By    Initials Name Provider Type    AS Delia Covarrubias PTA Physical Therapist Assistant               Goals/Plan    No documentation.                Clinical Impression     Row Name 11/08/22 1042          Pain Scale: FACES Pre/Post-Treatment    Pain: FACES Scale, Pretreatment 0-->no hurt  -AS     Posttreatment Pain Rating 0-->no hurt  -AS     Row Name 11/08/22 1042          Plan of Care Review    Plan of Care Reviewed With patient  -AS     Progress no change  -AS     Outcome Evaluation Patient with limited tolerance to activity, completed supine>sit with Min assist x1, sit>supine required max assist x2. Attempted to transfer to recliner but patient with increased confusion and inability to follow commands to safely transfer to recliner, nursing notified patient agitated and agressive towards staff and unable to transfer to recliner.  -AS     Row Name 11/08/22 1042          Positioning and Restraints    Pre-Treatment Position in bed  -AS     Post Treatment Position bed  -AS     In Bed supine;call light within reach;encouraged to call for assist;exit alarm on;patient within staff view  -AS           User Key  (r) = Recorded By, (t) = Taken By, (c) = Cosigned By    Initials Name Provider Type    AS Delia Covarrubias PTA Physical Therapist Assistant               Outcome Measures     Row Name 11/08/22 1046          How  much help from another person do you currently need...    Turning from your back to your side while in flat bed without using bedrails? 3  -AS     Moving from lying on back to sitting on the side of a flat bed without bedrails? 2  -AS     Moving to and from a bed to a chair (including a wheelchair)? 1  -AS     Standing up from a chair using your arms (e.g., wheelchair, bedside chair)? 1  -AS     Climbing 3-5 steps with a railing? 1  -AS     To walk in hospital room? 1  -AS     AM-PAC 6 Clicks Score (PT) 9  -AS     Highest level of mobility 3 --> Sat at edge of bed  -AS     Row Name 11/08/22 1046          Functional Assessment    Outcome Measure Options AM-PAC 6 Clicks Basic Mobility (PT)  -AS           User Key  (r) = Recorded By, (t) = Taken By, (c) = Cosigned By    Initials Name Provider Type    AS Delia Covarrubias PTA Physical Therapist Assistant                             Physical Therapy Education     Title: PT OT SLP Therapies (In Progress)     Topic: Physical Therapy (Resolved)     Point: Mobility training (Resolved)     Learning Progress Summary           Patient Acceptance, E, VU by  at 10/26/2022 2333    Eager, E, VU by SC at 10/26/2022 1543    Comment: reviewed HEp    Acceptance, E, VU by  at 10/25/2022 2209    Acceptance, E, NR by SC at 10/25/2022 1633    Comment: reviewed benefits of sitting up    Acceptance, E, VU by  at 10/22/2022 2204    Acceptance, E, NL by SC at 10/19/2022 1512    Comment: reviewed benefits of actiity                   Point: Home exercise program (Resolved)     Learning Progress Summary           Patient Acceptance, E, VU by  at 10/26/2022 2333    Eager, E, VU by SC at 10/26/2022 1543    Comment: reviewed HEp    Acceptance, E, VU by  at 10/25/2022 2209    Acceptance, E, NR by SC at 10/25/2022 1633    Comment: reviewed benefits of sitting up    Acceptance, E, VU by  at 10/22/2022 2204    Acceptance, E, NL by SC at 10/19/2022 1512    Comment: reviewed benefits of  actiity                   Point: Body mechanics (Resolved)     Learning Progress Summary           Patient Acceptance, E, VU by  at 10/26/2022 2333    Eager, E, VU by SC at 10/26/2022 1543    Comment: reviewed HEp    Acceptance, E, VU by  at 10/25/2022 2209    Acceptance, E, NR by SC at 10/25/2022 1633    Comment: reviewed benefits of sitting up    Acceptance, E, VU by  at 10/22/2022 2204    Acceptance, E, NL by SC at 10/19/2022 1512    Comment: reviewed benefits of actiity                   Point: Precautions (Resolved)     Learning Progress Summary           Patient Acceptance, E, VU by  at 10/26/2022 2333    Eager, E, VU by SC at 10/26/2022 1543    Comment: reviewed HEp    Acceptance, E, VU by  at 10/25/2022 2209    Acceptance, E, NR by SC at 10/25/2022 1633    Comment: reviewed benefits of sitting up    Acceptance, E, VU by  at 10/22/2022 2204    Acceptance, E, NL by SC at 10/19/2022 1512    Comment: reviewed benefits of actiity                               User Key     Initials Effective Dates Name Provider Type Discipline    SC 06/16/21 -  Efra Cristobal PT Physical Therapist PT     09/22/22 -  Augusta Singleton RN Registered Nurse Nurse              PT Recommendation and Plan     Plan of Care Reviewed With: patient  Progress: no change  Outcome Evaluation: Patient with limited tolerance to activity, completed supine>sit with Min assist x1, sit>supine required max assist x2. Attempted to transfer to recliner but patient with increased confusion and inability to follow commands to safely transfer to recliner, nursing notified patient agitated and agressive towards staff and unable to transfer to recliner.     Time Calculation:    PT Charges     Row Name 11/08/22 1047             Time Calculation    Start Time 0944  -AS      PT Received On 11/08/22  -AS      PT Goal Re-Cert Due Date 11/17/22  -AS         Timed Charges    98688 - PT Therapeutic Exercise Minutes 10  -AS      99909 - PT Therapeutic  Activity Minutes 13  -AS         Total Minutes    Timed Charges Total Minutes 23  -AS       Total Minutes 23  -AS            User Key  (r) = Recorded By, (t) = Taken By, (c) = Cosigned By    Initials Name Provider Type    AS Delia Covarrubias PTA Physical Therapist Assistant              Therapy Charges for Today     Code Description Service Date Service Provider Modifiers Qty    38768336424 HC PT THER PROC EA 15 MIN 11/8/2022 Delia Covarrubias, TERESSA GP 1    69218355033 HC PT THERAPEUTIC ACT EA 15 MIN 11/8/2022 Delia Covarrubias PTA GP 1    [id number]  PT THER SUPP EA 15 MIN 11/8/2022 Delia Covarrubias PTA GP 2          PT G-Codes  Outcome Measure Options: AM-PAC 6 Clicks Basic Mobility (PT)  AM-PAC 6 Clicks Score (PT): 9  AM-PAC 6 Clicks Score (OT): 12    Delia Covarrubias PTA  11/8/2022

## 2022-11-09 LAB
ANION GAP SERPL CALCULATED.3IONS-SCNC: 9 MMOL/L (ref 5–15)
BUN SERPL-MCNC: 21 MG/DL (ref 8–23)
BUN/CREAT SERPL: 22.8 (ref 7–25)
CALCIUM SPEC-SCNC: 9 MG/DL (ref 8.6–10.5)
CHLORIDE SERPL-SCNC: 106 MMOL/L (ref 98–107)
CO2 SERPL-SCNC: 24 MMOL/L (ref 22–29)
CREAT SERPL-MCNC: 0.92 MG/DL (ref 0.76–1.27)
EGFRCR SERPLBLD CKD-EPI 2021: 92.3 ML/MIN/1.73
GLUCOSE BLDC GLUCOMTR-MCNC: 106 MG/DL (ref 70–130)
GLUCOSE BLDC GLUCOMTR-MCNC: 117 MG/DL (ref 70–130)
GLUCOSE BLDC GLUCOMTR-MCNC: 80 MG/DL (ref 70–130)
GLUCOSE SERPL-MCNC: 90 MG/DL (ref 65–99)
POTASSIUM SERPL-SCNC: 3.8 MMOL/L (ref 3.5–5.2)
SODIUM SERPL-SCNC: 139 MMOL/L (ref 136–145)

## 2022-11-09 PROCEDURE — 80048 BASIC METABOLIC PNL TOTAL CA: CPT

## 2022-11-09 PROCEDURE — 99232 SBSQ HOSP IP/OBS MODERATE 35: CPT | Performed by: HOSPITALIST

## 2022-11-09 PROCEDURE — 97530 THERAPEUTIC ACTIVITIES: CPT

## 2022-11-09 PROCEDURE — 25010000002 CEFTRIAXONE PER 250 MG

## 2022-11-09 PROCEDURE — 25010000002 HEPARIN (PORCINE) PER 1000 UNITS: Performed by: ORTHOPAEDIC SURGERY

## 2022-11-09 PROCEDURE — 25010000002 VANCOMYCIN PER 500 MG

## 2022-11-09 PROCEDURE — 82962 GLUCOSE BLOOD TEST: CPT

## 2022-11-09 RX ADMIN — METFORMIN HYDROCHLORIDE 1000 MG: 1000 TABLET, FILM COATED ORAL at 18:01

## 2022-11-09 RX ADMIN — HEPARIN SODIUM 5000 UNITS: 5000 INJECTION INTRAVENOUS; SUBCUTANEOUS at 06:18

## 2022-11-09 RX ADMIN — NIFEDIPINE 30 MG: 30 TABLET, FILM COATED, EXTENDED RELEASE ORAL at 08:52

## 2022-11-09 RX ADMIN — GLIPIZIDE 2.5 MG: 5 TABLET ORAL at 18:01

## 2022-11-09 RX ADMIN — LISINOPRIL 40 MG: 40 TABLET ORAL at 08:52

## 2022-11-09 RX ADMIN — OLANZAPINE 10 MG: 5 TABLET, FILM COATED ORAL at 20:51

## 2022-11-09 RX ADMIN — FERROUS SULFATE TAB 325 MG (65 MG ELEMENTAL FE) 325 MG: 325 (65 FE) TAB at 08:51

## 2022-11-09 RX ADMIN — SODIUM CHLORIDE 2 G: 900 INJECTION INTRAVENOUS at 10:49

## 2022-11-09 RX ADMIN — Medication 5 MG: at 20:52

## 2022-11-09 RX ADMIN — GLIPIZIDE 2.5 MG: 5 TABLET ORAL at 08:52

## 2022-11-09 RX ADMIN — VANCOMYCIN HYDROCHLORIDE 750 MG: 750 INJECTION, SOLUTION INTRAVENOUS at 02:28

## 2022-11-09 RX ADMIN — OLANZAPINE 5 MG: 5 TABLET, FILM COATED ORAL at 08:54

## 2022-11-09 RX ADMIN — VANCOMYCIN HYDROCHLORIDE 750 MG: 750 INJECTION, SOLUTION INTRAVENOUS at 14:12

## 2022-11-09 RX ADMIN — HEPARIN SODIUM 5000 UNITS: 5000 INJECTION INTRAVENOUS; SUBCUTANEOUS at 20:51

## 2022-11-09 RX ADMIN — HEPARIN SODIUM 5000 UNITS: 5000 INJECTION INTRAVENOUS; SUBCUTANEOUS at 14:12

## 2022-11-09 RX ADMIN — SENNOSIDES AND DOCUSATE SODIUM 2 TABLET: 50; 8.6 TABLET ORAL at 08:51

## 2022-11-09 RX ADMIN — Medication 10 ML: at 08:54

## 2022-11-09 RX ADMIN — METFORMIN HYDROCHLORIDE 1000 MG: 1000 TABLET, FILM COATED ORAL at 08:52

## 2022-11-09 NOTE — PROGRESS NOTES
INFECTIOUS DISEASE Progress Note    Omkar Nava  1955  [id number]    Consult: 10/15/22  Admit date: 10/14/2022    Requesting Provider: Omkar Seth MD  Evaluating physician:  Rayray Gordon MD  Reason for Consultation: left foot osteomyelitis, first toe, distal phalanx  Chief Complaint: left foot pain      Subjective   History of present illness:  Patient is a  67 y.o. male with h/o T2DM, PN, HTN, normocytic anemia, and GERD who presented to [facility] ED on 10/14 for worsening left foot pain.  The patient is a poor historian and most of the information was taken from the chart.  He tripped at his nursing facility, [email or web address]], while running the street in flip-flops and sustained a laceration of his first webspace.  He was taken to [facility] ED on 9/17/22 and found to have a small intra-articular vertical fracture through the base the the great toe proximal phalanx. Podiatry, Dr. Ruiz, saw patient and the wound with irrigated with suture placement.  The area was dressed and he was placed in a surgical shoe.  He was given Cefazolin x 1 dose and discharged back to his facility on Keflex for 7 days.  On 9/26/22, the nursing staff at increased pain, swelling, and redness around the foot and sent him to [facility] ED on that day.  He was found to have a displaced fracture of proximal phalanx of left hallux along with abscess and gas in tissues.  He was admitted to the hospital and underwent Left foot debridement and I and D on 9/27/22 by Dr. Ruiz with culture positive for MRSA (Resistant to Cefazolin, clindamycin, erm, oxacillin, tetracycline, Bactrim and sensitive to Vancomycin, Daptomycin-KB 1, and Linezolid KB 2), Corynebacterium striatum, and Enterococcus faecalis (sens to Vanc and ampicillin).  He underwent a second debridement and I and D on 9/30.  He was given Cefazolin in ED and then changed to Daptomycin.  A PICC line was placed on 10/3 for Daptomycin infusions,  but the antibiotic was too expensive for the SNF, so he was changed to oral Zyvox and Rifampin until 10/26.  The PICC line was removed on 10/4.  His blood cultures remained negative.  He was discharged back to his nursing facility on 10/4/22.      He was sent to [facility] ED on 10/14 after nursing staff noted that his left hallux appeared necrotic.  He has had no fever, chills, shortness of breath, nausea, vomiting, diarrhea, dysuria, or worsening foot pain.  On arrival, he is afebrile and hemodynamically stable.  On 10/15, his BP went up to 203/81.  Admitting labs were WBC 7200 with 69% neutrophils, ESR 67, CRP 3.07, Hgb 9.2, PCT 0.05, lactic acid 2.3, and creatinine 1.21.  A MRSA PCR was negative.  Blood and wound cultures are pending.  An MRI of the left foot on 10/15 showed wound around the left great toe with marrow signal alteration within the distal aspect of 1st MT as well as both phalanges c/w osteomyelitis and scattered foci in soft tissues that may be foci of gas without evidence of fluid collections or abscesses. An Xray of left foot showed soft tissue swelling of 1st digit with displaced fracture of medial base of 1st proximal phalanx.  He is currently on Cefepime and Vancomycin.  ID was asked by Dr. Seth on 10/15 to evaluate and manage his antibiotic therapy.  Patient has had some issues with psychosis over the past 6 months possibly related to drug and alcohol use.  This is also interfered with his acceptance of care.    10/16/2022 history reviewed.  Patient more pleasant this morning.  No high fevers or chills.  Tolerating vancomycin and cefepime, duration to be determined.  Orthopedics following.  Previous history of MRSA.    10/17/22: history reviewed.  Patient with minimal response. Tmax 99.6.  On Vancomycin and Cefepime for left hallux osteomyelitis and surrounding infection with duration to be determined.  Awaiting evaluate by Dr. Petty as per Dr. Lieberman's note.  More cooperative.    10/18/2022 history  reviewed.  Patient awaiting a left hallux ray amputation by orthopedic surgery.  Tolerating vancomycin and cefepime.    10/19/2022 history reviewed.  The patient underwent a first toe ray resection on his left foot by Dr. Yeison Petty on 10/18/2022.  Continues on vancomycin and cefepime until 11/25/2022.  No high fevers or chills.    10/20/2022 history reviewed.  Status post left foot first ray toe resection on 10/18, with previous history of MRSA, and new infection with Raoultella ornithinolytica.  Tolerating vancomycin and ceftriaxone until 11/25/2022.  Waiting on placement.    10/24/2022 history reviewed.  No high fevers.  Status post left foot first ray toe resection on 10/18, with previous history of MRSA, and new infection with Raoultella ornithinolytica.  Continues on ceftriaxone and vancomycin until 11/25/2022.    10/25/2022 history reviewed.  Status post left foot first toe resection 10/18/2022 with cultures previously positive for MRSA, and now for Klebsiella and Raoultella.  Mental status has been a limiting factor with his psychiatric history in terms of treatment.  He is tolerating vancomycin and ceftriaxone to continue until 11/25/2022.  Placement is in progress.    10/26/2022 history reviewed.  Status post left first toe resection 10/18/2022 for osteomyelitis.  Tolerating ceftriaxone and vancomycin treating MRSA, Klebsiella, and other bacteria until 11/25/2022.  Awaiting placement, especially given his mental status and psychiatric history.  No high fever.  Pain controlled.    10/27/2022 history reviewed.  Continues on vancomycin and ceftriaxone until 11/25/2022 for left first toe osteomyelitis.  Status post resection 10/18/2022.  Awaiting placement.  No high fever.    10/31/2022 history reviewed.  Continues on antibiotics for left first toe osteomyelitis until 11/25.  Status post resection 10/18.  Confused off and on.  Awaiting placement.  No fever.    11/1/2022 history reviewed.  Tolerating  ceftriaxone and vancomycin until 11/25/2022 for left first toe osteomyelitis status post resection 10/18.  Pleasant but confused.  Awaiting placement.  No fever.  No pain.    11/2/2022 history reviewed.  On vancomycin and ceftriaxone until 11/25 for left toe osteomyelitis status post resection 10/18/2022.  Placement has been difficult as places or refusing him.  No fever.    11/3/2022 history reviewed.  Tolerating ceftriaxone and vancomycin until 11/25/2022 for left first toe osteomyelitis polymicrobial status post resection 10/18/2022.  Still a difficult placement issue.  No fever.    11/4/2022 history reviewed.  Continues on vancomycin and ceftriaxone until 11/25 for left first toe osteomyelitis polymicrobial, status post amputation and resection 10/18/2022.  More amendable to care.  No fever.  Waiting on placement.    11/7/2022 history reviewed.  Tolerating ceftriaxone and vancomycin until 11/25 for left first toe osteomyelitis status post resection 10/18.  No high fever.    11/8/2022 history reviewed.  Continues on antibiotics till 11/25 for left first toe osteomyelitis status post resection 10/18.  No fever.    11/9/2022 history reviewed.  Continues on vancomycin and ceftriaxone until 11/25 for left first toe osteomyelitis.  Pleasant but confused.  No fever.    Past Medical History:   Diagnosis Date   • Diabetes mellitus (HCC)    • GERD (gastroesophageal reflux disease)    HTN  Psychotic d/o, unspecified.    Past Surgical History:   Procedure Laterality Date   • AMPUTATION FOOT Left 10/18/2022    Procedure: PARTIAL FIRST RAY AMPUTATION LEFT;  Surgeon: Yeison Petty MD;  Location: [facility];  Service: Orthopedics;  Laterality: Left;   • EYE SURGERY     Left foot debridement/I and D x 2 9/27/22 and 9/30/22.  Left first ray amputation 10/18/2022.    Pediatric History   Patient Parents   • Not on file     Other Topics Concern   • Not on file   Social History Narrative    Caffeine 0-1 servings per day    Patient  lives at home .   Patient lives at [facility]  Former smoker, no alcohol, smokes marijuana.     family history includes Diabetes in his brother, brother, father, maternal grandfather, maternal grandmother, mother, and sister; Hypertension in his brother, brother, father, and mother.    No Known Allergies      There is no immunization history on file for this patient.    Medication:    Current Facility-Administered Medications:   •  acetaminophen (TYLENOL) tablet 650 mg, 650 mg, Oral, Q4H PRN, 650 mg at 11/08/22 1942 **OR** [DISCONTINUED] acetaminophen (TYLENOL) suppository 650 mg, 650 mg, Rectal, Q4H PRN, Yeison Petty MD  •  bisacodyl (DULCOLAX) suppository 10 mg, 10 mg, Rectal, Daily PRN, Yeison Petty MD  •  cefTRIAXone (ROCEPHIN) 2 g/100 mL 0.9% NS IVPB (MBP), 2 g, Intravenous, Q24H, Hero Ty, [facility], Last Rate: 200 mL/hr at 10/28/22 1109, 2 g at 11/08/22 1424  •  dextrose (D50W) (25 g/50 mL) IV injection 25 g, 25 g, Intravenous, Q15 Min PRN, Yeison Petty MD  •  dextrose (GLUTOSE) oral gel 15 g, 15 g, Oral, Q15 Min PRN, Yeison Petty MD  •  ferrous sulfate tablet 325 mg, 325 mg, Oral, Daily With Breakfast, Yeison Petty MD, 325 mg at 11/09/22 0851  •  glipizide (GLUCOTROL) tablet 2.5 mg, 2.5 mg, Oral, BID HANNA, Elena Ugalde PA-C, 2.5 mg at 11/09/22 0852  •  glucagon (human recombinant) (GLUCAGEN DIAGNOSTIC) injection 1 mg, 1 mg, Intramuscular, Q15 Min PRN, Yeison Petty MD  •  heparin (porcine) 5000 UNIT/ML injection 5,000 Units, 5,000 Units, Subcutaneous, Q8H, Yeison Petty MD, 5,000 Units at 11/09/22 0618  •  lisinopril (PRINIVIL,ZESTRIL) tablet 40 mg, 40 mg, Oral, Daily, Shahbaz Kuhn MD, 40 mg at 11/09/22 0852  •  magnesium sulfate 4 gram infusion - Mg less than or equal to 1mg/dL, 4 g, Intravenous, PRN **OR** magnesium sulfate 3 gram infusion (1gm x 3) - Mg 1.1 - 1.5 mg/dL, 1 g, Intravenous, PRN **OR** Magnesium Sulfate 2 gram infusion- Mg 1.6 - 1.9 mg/dL, 2 g, Intravenous,  PRN, Yeison Petty MD  •  melatonin tablet 5 mg, 5 mg, Oral, Nightly PRN, Yeison Petty MD, 5 mg at 22  •  metFORMIN (GLUCOPHAGE) tablet 1,000 mg, 1,000 mg, Oral, BID With Meals, Elena Ugalde PA-C, 1,000 mg at 22 0852  •  [] HYDROmorphone (DILAUDID) injection 0.5 mg, 0.5 mg, Intravenous, Q2H PRN, 0.5 mg at 10/24/22 1449 **AND** naloxone (NARCAN) injection 0.1 mg, 0.1 mg, Intravenous, Q5 Min PRN, Yeison Petty MD  •  NIFEdipine XL (PROCARDIA XL) 24 hr tablet 30 mg, 30 mg, Oral, Q24H, Elena Ugalde PA-C, 30 mg at 22 0852  •  OLANZapine (zyPREXA) tablet 5 mg, 5 mg, Oral, Daily, 5 mg at 22 0854 **AND** OLANZapine (zyPREXA) tablet 10 mg, 10 mg, Oral, Nightly, Nicole Henriquez DO, 10 mg at 22 1943  •  OLANZapine zydis (zyPREXA) disintegrating tablet 5 mg, 5 mg, Oral, Daily PRN, Elena Ugalde PA-C  •  polyethylene glycol (MIRALAX) packet 17 g, 17 g, Oral, PRN, Yeison Petty MD, 17 g at 22 0802  •  sennosides-docusate (PERICOLACE) 8.6-50 MG per tablet 2 tablet, 2 tablet, Oral, BID, Elena Ugalde PA-C, 2 tablet at 22 0851  •  Insert peripheral IV, , , Once **AND** sodium chloride 0.9 % flush 10 mL, 10 mL, Intravenous, PRN, Yeison Petty MD, 10 mL at 22 0435  •  sodium chloride 0.9 % flush 10 mL, 10 mL, Intravenous, Q12H, Nicole Henriquez DO, 10 mL at 22 0854  •  vancomycin (dosing per levels), , Does not apply, Daily, Hero Ty RPH  •  vancomycin in dextrose 5% 150 mL (VANCOCIN) IVPB 750 mg, 750 mg, Intravenous, Q12H, Hero Ty RPH, 750 mg at 22 0228    Please refer to the medical record for a full medication list    Review of Systems:  Unable to get a reliable history as patient is responding with yes and no to basic questions and has a psychotic disorder.  Denies fever, chills, nausea, vomiting, diarrhea, shortness of breath, dysuria, or rashes.    Physical Exam:   Vital Signs   Temp:  [98 °F (36.7  "°C)-98.8 °F (37.1 °C)] 98.6 °F (37 °C)  Heart Rate:  [74-88] 74  Resp:  [14-16] 16  BP: (158-161)/(75-80) 160/80    Temp  Min: 98 °F (36.7 °C)  Max: 98.8 °F (37.1 °C)  BP  Min: 158/75  Max: 161/75  Pulse  Min: 74  Max: 88  Resp  Min: 14  Max: 16  SpO2  Min: 95 %  Max: 96 %    Blood pressure 160/80, pulse 74, temperature 98.6 °F (37 °C), temperature source Oral, resp. rate 16, height 182.9 cm (72\"), weight 87.4 kg (192 lb 9.6 oz), SpO2 96 %.  GENERAL: Awake and alert, in minimal distress. Appears older than stated age.  Resting in bed.  Eating some food.  HEENT:  Normocephalic, atraumatic.  Oropharynx without thrush.   EYES: No conjunctival injection. No icterus. EOM full.  LYMPHATICS: No lymphadenopathy of the neck or axillary or inguinal regions.   HEART: No murmur, gallop, or pericardial friction rub. Reg rate rhythm.    LUNGS: Clear to auscultation and percussion. No respiratory distress, no use of accessory muscles.  No wheezes.  ABDOMEN: Soft, nontender, nondistended. No appreciable HSM.  Bowel sounds normal.  SKIN: Warm and dry without cutaneous eruptions.  No nodules. Left foot dressing in place, right arm PICC okay placed 10/27.  Splint in place.  PSYCHIATRIC: Mental status with occasional confusion.  But overall pleasant.  Can follow commands at times.    EXT: Left foot surgical dressing in place.  No crepitus, some drainage from webspace. Normal range of motion.  NEURO: Oriented to name, nonfocal.  Follows some commands.    Results Review:   I reviewed the patient's new clinical results.  I reviewed the patient's new imaging results and agree with the interpretation.  I reviewed the patient's other test results and agree with the interpretation    Results from last 7 days   Lab Units 11/08/22  0503 11/05/22  1111   WBC 10*3/mm3 8.71 5.79   HEMOGLOBIN g/dL 8.8* 7.9*   HEMATOCRIT % 32.7* 26.4*   PLATELETS 10*3/mm3 410 388     Results from last 7 days   Lab Units 11/09/22  0514   SODIUM mmol/L 139   POTASSIUM " mmol/L 3.8   CHLORIDE mmol/L 106   CO2 mmol/L 24.0   BUN mg/dL 21   CREATININE mg/dL 0.92   GLUCOSE mg/dL 90   CALCIUM mg/dL 9.0     Results from last 7 days   Lab Units 11/08/22  1012   ALK PHOS U/L 111   BILIRUBIN mg/dL <0.2   ALT (SGPT) U/L 20   AST (SGOT) U/L 14             Results from last 7 days   Lab Units 11/07/22  1127   VANCOMYCIN RM mcg/mL 24.60         Estimated Creatinine Clearance: 99 mL/min (by C-G formula based on SCr of 0.92 mg/dL).  CPK    Common Labsle 10/19/22   Creatine Kinase 119            Procalitonin Results:       Brief Urine Lab Results  (Last result in the past 365 days)      Color   Clarity   Blood   Leuk Est   Nitrite   Protein   CREAT   Urine HCG        06/12/22 2056 Yellow   Clear     Negative                    No results found for: SITE, ALLENTEST, PHART, POZ5XWI, PO2ART, KNU2WLB, BASEEXCESS, Z4AYBMPQ, HGBBG, HCTABG, OXYHEMOGLOBI, METHHGBN, CARBOXYHGB, CO2CT, BAROMETRIC, MODALITY, FIO2     Microbiology:  Microbiology Results (last 10 days)     ** No results found for the last 240 hours. **        Blood and wound cultures 10/14 pending.       No results found for: TISSCXQ, CULTURES, CULTURE, BLOODCX, ANACX, BALCX, ACIDFASTCX, BODYFLDCX, CXREFLEX, FUNGUSCX, RESPCX, MRSACX, ROUTCX, BRCHWSHCLT, TISSUECX, URINECX, CMVCX, WOUNDCX, BCIDPCR, BFCULTURE, BLOODCULT(      Radiology:  Imaging Results (Last 72 Hours)     ** No results found for the last 72 hours. **          IMPRESSION:   1. Left hallux osteomyelitis after trauma/displaced fracture 9/17.  At risk for fracture not healing in the first toe given the infection, noncompliance with walking on foot, and ongoing infection.  Likely gangrene and poor wound healing related to vascular disease.  Resolving after surgery 10/18.  2. 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  Cx with MRSA, Enterococcus faecalis, and Corynebacterium striatum.  Initially treated with Daptomycin and discharge  on oral Zyvox and oral Rifampin until 10/26/22.  Cx with Raoultella ornithinolytica (sens to Cefepime, ceftriaxone) and Klebsiella.  Status post left first ray resection 10/18/2022.  Improved.  3. Lactic acidosis, related to above. Resolved.   4. Elevated inflammatory markers, related to above.  CRP 3.07 on 10/14.  CRP 3.10 on 10/26.  5. Type 2 diabetes mellitus/peripheral neuropathy.  Affects wound healing.  6. Hyperglycemia, related to above.  7. Anemia of chronic disease.  Ongoing.  8. Essential hypertension.  Controlled.  9. Gastroesophageal reflux disease.  10. Thrombocytosis.  Related to above issues.  Resolved.  11. Psychosis since April 2022 reported by the patient's sister, thought to be related to drug and alcohol use.  Was hospitalized previously at [facility].  Awaiting placement.  12. Hyponatremia, resolved.  13. H/o MRSA.  14. Hypocalcemia resolved.  15. Elevated alkaline phosphatase resolved.    Tolerating current regimen.  Discussed with nursing and family.  Main issue is finding placement at this point.  Discussed with case management.    Plan:  1. Diagnostically, follow blood and wound cultures, imaging, physical examination, CBC, CMP, CRP, and vancomycin levels.   2. Therapeutically, continue ceftriaxone and Vancomycin for 6 weeks of IV antibiotics until 11/25/22.  This will cover the organisms including previous MRSA.  Vancomycin dose previously increased to 1 g IV every 12 hours.  3. Orthopedic surgery status post left first ray resection for osteomyelitis 10/18/2022.   4. Awaiting placement.  5. Continue supportive care.  PICC placement right arm 10/27/2022.    I discussed the patient's findings and my recommendations with patient and his family.  He will need to be in a skilled facility to receive his antibiotics.    Our group would be pleased to follow this patient over the course of their hospitalization and assist with outpatient antimicrobial therapy, as indicated.  Further  recommendations depend on the results of the cultures and clinical course. Discussed with patient's sister and family.  Difficult issue with compliance in terms of wound care and staying off his foot.      Case management orders: Please arrange for skilled facility IV antibiotics with ceftriaxone 2 g IV daily, vancomycin 1 g IV every 12 hours to continue until 11/25/2022.  Pharmacy to adjust dose of vancomycin based on weekly trough levels to try to maintain level between 12 and 18.  Check CBC, CMP, CRP, vancomycin trough weekly while on IV antibiotics.  Fax orders to [phone], call [phone] with final arrangements.  The treatment is for left first toe osteomyelitis with MRSA, Klebsiella, and Raoultella ornithinolytica.  Arrange for follow-up with me in 2 weeks post discharge.  Weekly PICC dressing changes.    Rayray Gordon MD  11/9/2022

## 2022-11-09 NOTE — PLAN OF CARE
Goal Outcome Evaluation:  Plan of Care Reviewed With: patient        Progress: no change  Outcome Evaluation: Patient demonstrated alot of posterior leaning in standing making upright mobility unsafe. Mechanical gait aid may be helpful

## 2022-11-09 NOTE — CASE MANAGEMENT/SOCIAL WORK
Continued Stay Note  [facility]     Patient Name: Omkar Nava  MRN: [id number]  Today's Date: 11/9/2022    Admit Date: 10/14/2022    Plan: TBD   Discharge Plan     Row Name 11/09/22 0852       Plan    Plan TBD    Patient/Family in Agreement with Plan yes    Plan Comments Spoke with patient at bedside. Plan is SNF to long term placement. Referral pending with [facility] in [place]. May need to be Complex. Patient denies any discharge needs at this time. CM will continue to follow.    Final Discharge Disposition Code 03 - skilled nursing facility (SNF)               Discharge Codes    No documentation.               Expected Discharge Date and Time     Expected Discharge Date Expected Discharge Time    Nov 11, 2022             Hardik Bang RN

## 2022-11-09 NOTE — PLAN OF CARE
Goal Outcome Evaluation: Alert to self. VSS. Pt rested well for a few hours this shift. Antibiotics given. Awaiting placement.

## 2022-11-09 NOTE — THERAPY TREATMENT NOTE
Patient Name: Omkar Nava  : 1955    MRN: [id number]                              Today's Date: 2022       Admit Date: 10/14/2022    Visit Dx:     ICD-10-CM ICD-9-CM   1. Osteomyelitis of toe of left foot (HCC)  M86.9 730.27   2. Anemia, unspecified type  D64.9 285.9   3. Hyperglycemia  R73.9 790.29   4. 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, without long-term current use of insulin   • Orthostatic hypotension   • Acute osteomyelitis of left foot (HCC)   • Type 2 diabetes mellitus (HCC)   • Essential hypertension   • Psychotic disorder (HCC)   • Neurocognitive disorder     Past Medical History:   Diagnosis Date   • Diabetes mellitus (HCC)    • GERD (gastroesophageal reflux disease)      Past Surgical History:   Procedure Laterality Date   • AMPUTATION FOOT Left 10/18/2022    Procedure: PARTIAL FIRST RAY AMPUTATION LEFT;  Surgeon: Yeison Petty MD;  Location: [facility];  Service: Orthopedics;  Laterality: Left;   • EYE SURGERY        General Information     Row Name 22 1617 22 1412       Physical Therapy Time and Intention    Document Type therapy note (daily note)  -SC discharge evaluation/summary  -SC    Mode of Treatment physical therapy  -SC physical therapy  -SC    Row Name 22 161          General Information    Patient Profile Reviewed yes  -SC     Existing Precautions/Restrictions --  WBAT wit HEEL off loading shoe on , R foot with cast shoe, dementia, blindness  -SC     Row Name 22          Cognition    Orientation Status (Cognition) person  knows he is in the hospital  -SC     Row Name 22 161          Safety Issues, Functional Mobility    Impairments Affecting Function (Mobility) balance;cognition;coordination;endurance/activity  tolerance;motor control;postural/trunk control;strength;visual/perceptual  -SC     Comment, Safety Issues/Impairments (Mobility) alert, following directions  -SC           User Key  (r) = Recorded By, (t) = Taken By, (c) = Cosigned By    Initials Name Provider Type    SC Efra Cristobal, PT Physical Therapist               Mobility     Row Name 11/09/22 1620          Bed Mobility    Bed Mobility supine-sit;scooting/bridging  -SC     Scooting/Bridging [place] (Bed Mobility) 1 person assist;contact guard  -SC     Supine-Sit [place] (Bed Mobility) minimum assist (75% patient effort);1 person assist  -SC     Assistive Device (Bed Mobility) head of bed elevated;bed rails  -SC     Comment, (Bed Mobility) wxtra time and tactile cues to use bedrails  -SC     Row Name 11/09/22 1620          Transfers    Comment, (Transfers) STS from bed with time taken  to work on standing balance. Patient with posterior lean. Working on inhibiting this and finding midline.  -SC     Row Name 11/09/22 1620          Bed-Chair Transfer    Bed-Chair [place] (Transfers) 2 person assist;moderate assist (50% patient effort)  -SC     Assistive Device (Bed-Chair Transfers) walker, front-wheeled  -SC     Row Name 11/09/22 1620          Sit-Stand Transfer    Sit-Stand [place] (Transfers) 2 person assist;moderate assist (50% patient effort)  -SC     Assistive Device (Sit-Stand Transfers) walker, front-wheeled  -SC     Row Name 11/09/22 1620          Gait/Stairs (Locomotion)    [place] Level (Gait) 2 person assist;moderate assist (50% patient effort)  -SC     Assistive Device (Gait) walker, front-wheeled  -SC     Distance in Feet (Gait) 3  -SC     Deviations/Abnormal Patterns (Gait) stride length decreased;festinating/shuffling  -SC     Comment, (Gait/Stairs) PT controling walker 100% . Cues for upright posture. patient with some posterior lean during ambulation which was cut short only for transfers  -SC           User Key  (r)  = Recorded By, (t) = Taken By, (c) = Cosigned By    Initials Name Provider Type    SC Efra Cristobal, PT Physical Therapist               Obj/Interventions     [facility] Name 11/09/22 1623          Balance    Dynamic Standing Balance moderate assist;2-person assist  -SC     Position/Device Used, Standing Balance supported;walker, rolling  -SC           User Key  (r) = Recorded By, (t) = Taken By, (c) = Cosigned By    Initials Name Provider Type    SC Efra Cristobal, PT Physical Therapist               Goals/Plan    No documentation.                Clinical Impression     [facility] Name 11/09/22 1623          Pain    Pretreatment Pain Rating 0/10 - no pain  -SC     Posttreatment Pain Rating 0/10 - no pain  -SC     Row Name 11/09/22 1623          Plan of Care Review    Plan of Care Reviewed With patient  -SC     Progress no change  -SC     Outcome Evaluation Patient demonstrated alot of posterior leaning in standing making upright mobility unsafe. Mechanical gait aid may be helpful  -SC     Row Name 11/09/22 1623          Therapy Assessment/Plan (PT)    Rehab Potential (PT) good, to achieve stated therapy goals  -SC     Criteria for Skilled Interventions Met (PT) yes;meets criteria  -SC     Therapy Frequency (PT) daily  -[facility] Name 11/09/22 1623          Positioning and Restraints    Pre-Treatment Position in bed  -SC     Post Treatment Position chair  -SC     In Chair notified nsg;reclined;sitting;call light within reach;encouraged to call for assist;exit alarm on;with family/caregiver  -SC           User Key  (r) = Recorded By, (t) = Taken By, (c) = Cosigned By    Initials Name Provider Type    SC Efra Cristobal, PT Physical Therapist               Outcome Measures     [facility] Name 11/09/22 1625          How much help from another person do you currently need...    Turning from your back to your side while in flat bed without using bedrails? 3  -SC     Moving to and from a bed to a chair (including a wheelchair)? 2  -SC      Standing up from a chair using your arms (e.g., wheelchair, bedside chair)? 2  -SC     Climbing 3-5 steps with a railing? 1  -SC     To walk in hospital room? 2  -SC     Row Name 11/09/22 1625          Functional Assessment    Outcome Measure Options AM-PAC 6 Clicks Basic Mobility (PT)  -SC           User Key  (r) = Recorded By, (t) = Taken By, (c) = Cosigned By    Initials Name Provider Type    Efra Perez PT Physical Therapist                             Physical Therapy Education     Title: PT OT SLP Therapies (In Progress)     Topic: Physical Therapy (Done)     Point: Mobility training (Done)     Learning Progress Summary           Patient Eager, E, VU by SC at 11/9/2022 1625    Comment: reviewed benefits of getting oob    Acceptance, E, VU by  at 10/26/2022 2333    Eager, E, VU by SC at 10/26/2022 1543    Comment: reviewed HEp    Acceptance, E, VU by  at 10/25/2022 2209    Acceptance, E, NR by SC at 10/25/2022 1633    Comment: reviewed benefits of sitting up    Acceptance, E, VU by  at 10/22/2022 2204    Acceptance, E, NL by SC at 10/19/2022 1512    Comment: reviewed benefits of actiity                   Point: Home exercise program (Done)     Learning Progress Summary           Patient Eager, E, VU by SC at 11/9/2022 1625    Comment: reviewed benefits of getting oob    Acceptance, E, VU by  at 10/26/2022 2333    Eager, E, VU by SC at 10/26/2022 1543    Comment: reviewed HEp    Acceptance, E, VU by  at 10/25/2022 2209    Acceptance, E, NR by SC at 10/25/2022 1633    Comment: reviewed benefits of sitting up    Acceptance, E, VU by  at 10/22/2022 2204    Acceptance, E, NL by SC at 10/19/2022 1512    Comment: reviewed benefits of actiity                   Point: Body mechanics (Done)     Learning Progress Summary           Patient Eager, E, VU by SC at 11/9/2022 1625    Comment: reviewed benefits of getting oob    Acceptance, E, VU by  at 10/26/2022 2333    Eager, E, VU by SC at 10/26/2022  1543    Comment: reviewed HEp    Acceptance, E, VU by  at 10/25/2022 2209    Acceptance, E, NR by SC at 10/25/2022 1633    Comment: reviewed benefits of sitting up    Acceptance, E, VU by  at 10/22/2022 2204    Acceptance, E, NL by SC at 10/19/2022 1512    Comment: reviewed benefits of actiity                   Point: Precautions (Done)     Learning Progress Summary           Patient Eager, E, VU by SC at 11/9/2022 1625    Comment: reviewed benefits of getting oob    Acceptance, E, VU by  at 10/26/2022 2333    Eager, E, VU by SC at 10/26/2022 1543    Comment: reviewed HEp    Acceptance, E, VU by  at 10/25/2022 2209    Acceptance, E, NR by SC at 10/25/2022 1633    Comment: reviewed benefits of sitting up    Acceptance, E, VU by  at 10/22/2022 2204    Acceptance, E, NL by SC at 10/19/2022 1512    Comment: reviewed benefits of actiity                               User Key     Initials Effective Dates Name Provider Type Discipline    SC 06/16/21 -  Efra Cristobal PT Physical Therapist PT     09/22/22 -  Augusta Singleton RN Registered Nurse Nurse              PT Recommendation and Plan     Plan of Care Reviewed With: patient  Progress: no change  Outcome Evaluation: Patient demonstrated alot of posterior leaning in standing making upright mobility unsafe. Mechanical gait aid may be helpful     Time Calculation:    PT Charges     Row Name 11/09/22 1605             Time Calculation    Start Time 1605  -SC      PT Received On 11/09/22  -SC      PT Goal Re-Cert Due Date 11/17/22  -SC         Time Calculation- PT    Total Timed Code Minutes- PT 20 minute(s)  -SC         Timed Charges    88326 - Gait Training Minutes  8  -SC      71726 - PT Therapeutic Activity Minutes 12  -SC         Total Minutes    Timed Charges Total Minutes 20  -SC       Total Minutes 20  -SC            User Key  (r) = Recorded By, (t) = Taken By, (c) = Cosigned By    Initials Name Provider Type    SC Efra Cristobal, PT Physical Therapist               Therapy Charges for Today     Code Description Service Date Service Provider Modifiers Qty    [id number] HC PT THERAPEUTIC ACT EA 15 MIN 11/9/2022 Efra Cristobal, PT GP 1          PT G-Codes  Outcome Measure Options: AM-PAC 6 Clicks Basic Mobility (PT)  AM-PAC 6 Clicks Score (PT): 9  AM-PAC 6 Clicks Score (OT): 12    Efra Cristobal PT  11/9/2022

## 2022-11-10 LAB
ANION GAP SERPL CALCULATED.3IONS-SCNC: 7 MMOL/L (ref 5–15)
BUN SERPL-MCNC: 22 MG/DL (ref 8–23)
BUN/CREAT SERPL: 23.9 (ref 7–25)
CALCIUM SPEC-SCNC: 9.1 MG/DL (ref 8.6–10.5)
CHLORIDE SERPL-SCNC: 105 MMOL/L (ref 98–107)
CO2 SERPL-SCNC: 27 MMOL/L (ref 22–29)
CREAT SERPL-MCNC: 0.92 MG/DL (ref 0.76–1.27)
EGFRCR SERPLBLD CKD-EPI 2021: 92.3 ML/MIN/1.73
GLUCOSE BLDC GLUCOMTR-MCNC: 102 MG/DL (ref 70–130)
GLUCOSE BLDC GLUCOMTR-MCNC: 99 MG/DL (ref 70–130)
GLUCOSE BLDC GLUCOMTR-MCNC: 99 MG/DL (ref 70–130)
GLUCOSE SERPL-MCNC: 95 MG/DL (ref 65–99)
POTASSIUM SERPL-SCNC: 4.4 MMOL/L (ref 3.5–5.2)
SODIUM SERPL-SCNC: 139 MMOL/L (ref 136–145)

## 2022-11-10 PROCEDURE — 25010000002 CEFTRIAXONE PER 250 MG

## 2022-11-10 PROCEDURE — 80048 BASIC METABOLIC PNL TOTAL CA: CPT

## 2022-11-10 PROCEDURE — 82962 GLUCOSE BLOOD TEST: CPT

## 2022-11-10 PROCEDURE — 25010000002 VANCOMYCIN PER 500 MG

## 2022-11-10 PROCEDURE — 99232 SBSQ HOSP IP/OBS MODERATE 35: CPT | Performed by: HOSPITALIST

## 2022-11-10 PROCEDURE — 25010000002 HEPARIN (PORCINE) PER 1000 UNITS: Performed by: ORTHOPAEDIC SURGERY

## 2022-11-10 RX ADMIN — Medication 10 ML: at 08:23

## 2022-11-10 RX ADMIN — OLANZAPINE 10 MG: 5 TABLET, FILM COATED ORAL at 21:33

## 2022-11-10 RX ADMIN — SENNOSIDES AND DOCUSATE SODIUM 2 TABLET: 50; 8.6 TABLET ORAL at 08:21

## 2022-11-10 RX ADMIN — METFORMIN HYDROCHLORIDE 1000 MG: 1000 TABLET, FILM COATED ORAL at 08:21

## 2022-11-10 RX ADMIN — NIFEDIPINE 30 MG: 30 TABLET, FILM COATED, EXTENDED RELEASE ORAL at 08:21

## 2022-11-10 RX ADMIN — FERROUS SULFATE TAB 325 MG (65 MG ELEMENTAL FE) 325 MG: 325 (65 FE) TAB at 08:21

## 2022-11-10 RX ADMIN — HEPARIN SODIUM 5000 UNITS: 5000 INJECTION INTRAVENOUS; SUBCUTANEOUS at 21:33

## 2022-11-10 RX ADMIN — VANCOMYCIN HYDROCHLORIDE 750 MG: 750 INJECTION, SOLUTION INTRAVENOUS at 14:30

## 2022-11-10 RX ADMIN — SODIUM CHLORIDE 2 G: 900 INJECTION INTRAVENOUS at 11:29

## 2022-11-10 RX ADMIN — VANCOMYCIN HYDROCHLORIDE 750 MG: 750 INJECTION, SOLUTION INTRAVENOUS at 03:39

## 2022-11-10 RX ADMIN — METFORMIN HYDROCHLORIDE 1000 MG: 1000 TABLET, FILM COATED ORAL at 17:48

## 2022-11-10 RX ADMIN — HEPARIN SODIUM 5000 UNITS: 5000 INJECTION INTRAVENOUS; SUBCUTANEOUS at 06:03

## 2022-11-10 RX ADMIN — HEPARIN SODIUM 5000 UNITS: 5000 INJECTION INTRAVENOUS; SUBCUTANEOUS at 14:30

## 2022-11-10 RX ADMIN — Medication 10 ML: at 21:34

## 2022-11-10 RX ADMIN — LISINOPRIL 40 MG: 40 TABLET ORAL at 08:21

## 2022-11-10 RX ADMIN — GLIPIZIDE 2.5 MG: 5 TABLET ORAL at 17:48

## 2022-11-10 RX ADMIN — GLIPIZIDE 2.5 MG: 5 TABLET ORAL at 08:21

## 2022-11-10 RX ADMIN — OLANZAPINE 5 MG: 5 TABLET, FILM COATED ORAL at 08:21

## 2022-11-10 RX ADMIN — Medication 5 MG: at 21:33

## 2022-11-10 RX ADMIN — SENNOSIDES AND DOCUSATE SODIUM 2 TABLET: 50; 8.6 TABLET ORAL at 21:33

## 2022-11-10 NOTE — PROGRESS NOTES
INFECTIOUS DISEASE Progress Note    Omkar Nava  1955  [id number]    Consult: 10/15/22  Admit date: 10/14/2022    Requesting Provider: Omkar Seth MD  Evaluating physician:  Rayray Gordon MD  Reason for Consultation: left foot osteomyelitis, first toe, distal phalanx  Chief Complaint: left foot pain      Subjective   History of present illness:  Patient is a  67 y.o. male with h/o T2DM, PN, HTN, normocytic anemia, and GERD who presented to [facility] ED on 10/14 for worsening left foot pain.  The patient is a poor historian and most of the information was taken from the chart.  He tripped at his nursing facility, [facility], while running the street in flip-flops and sustained a laceration of his first webspace.  He was taken to [facility] ED on 9/17/22 and found to have a small intra-articular vertical fracture through the base the the great toe proximal phalanx. Podiatry, Dr. Ruiz, saw patient and the wound with irrigated with suture placement.  The area was dressed and he was placed in a surgical shoe.  He was given Cefazolin x 1 dose and discharged back to his facility on Keflex for 7 days.  On 9/26/22, the nursing staff at increased pain, swelling, and redness around the foot and sent him to [facility] ED on that day.  He was found to have a displaced fracture of proximal phalanx of left hallux along with abscess and gas in tissues.  He was admitted to the hospital and underwent Left foot debridement and I and D on 9/27/22 by Dr. Ruiz with culture positive for MRSA (Resistant to Cefazolin, clindamycin, erm, oxacillin, tetracycline, Bactrim and sensitive to Vancomycin, Daptomycin-KB 1, and Linezolid KB 2), Corynebacterium striatum, and Enterococcus faecalis (sens to Vanc and ampicillin).  He underwent a second debridement and I and D on 9/30.  He was given Cefazolin in ED and then changed to Daptomycin.  A PICC line was placed on 10/3 for Daptomycin infusions,  but the antibiotic was too expensive for the SNF, so he was changed to oral Zyvox and Rifampin until 10/26.  The PICC line was removed on 10/4.  His blood cultures remained negative.  He was discharged back to his nursing facility on 10/4/22.      He was sent to [facility] ED on 10/14 after nursing staff noted that his left hallux appeared necrotic.  He has had no fever, chills, shortness of breath, nausea, vomiting, diarrhea, dysuria, or worsening foot pain.  On arrival, he is afebrile and hemodynamically stable.  On 10/15, his BP went up to 203/81.  Admitting labs were WBC 7200 with 69% neutrophils, ESR 67, CRP 3.07, Hgb 9.2, PCT 0.05, lactic acid 2.3, and creatinine 1.21.  A MRSA PCR was negative.  Blood and wound cultures are pending.  An MRI of the left foot on 10/15 showed wound around the left great toe with marrow signal alteration within the distal aspect of 1st MT as well as both phalanges c/w osteomyelitis and scattered foci in soft tissues that may be foci of gas without evidence of fluid collections or abscesses. An Xray of left foot showed soft tissue swelling of 1st digit with displaced fracture of medial base of 1st proximal phalanx.  He is currently on Cefepime and Vancomycin.  ID was asked by Dr. Seth on 10/15 to evaluate and manage his antibiotic therapy.  Patient has had some issues with psychosis over the past 6 months possibly related to drug and alcohol use.  This is also interfered with his acceptance of care.    10/16/2022 history reviewed.  Patient more pleasant this morning.  No high fevers or chills.  Tolerating vancomycin and cefepime, duration to be determined.  Orthopedics following.  Previous history of MRSA.    10/17/22: history reviewed.  Patient with minimal response. Tmax 99.6.  On Vancomycin and Cefepime for left hallux osteomyelitis and surrounding infection with duration to be determined.  Awaiting evaluate by Dr. Petty as per Dr. Lieberman's note.  More cooperative.    10/18/2022 history  reviewed.  Patient awaiting a left hallux ray amputation by orthopedic surgery.  Tolerating vancomycin and cefepime.    10/19/2022 history reviewed.  The patient underwent a first toe ray resection on his left foot by Dr. Yeison Petty on 10/18/2022.  Continues on vancomycin and cefepime until 11/25/2022.  No high fevers or chills.    10/20/2022 history reviewed.  Status post left foot first ray toe resection on 10/18, with previous history of MRSA, and new infection with Raoultella ornithinolytica.  Tolerating vancomycin and ceftriaxone until 11/25/2022.  Waiting on placement.    10/24/2022 history reviewed.  No high fevers.  Status post left foot first ray toe resection on 10/18, with previous history of MRSA, and new infection with Raoultella ornithinolytica.  Continues on ceftriaxone and vancomycin until 11/25/2022.    10/25/2022 history reviewed.  Status post left foot first toe resection 10/18/2022 with cultures previously positive for MRSA, and now for Klebsiella and Raoultella.  Mental status has been a limiting factor with his psychiatric history in terms of treatment.  He is tolerating vancomycin and ceftriaxone to continue until 11/25/2022.  Placement is in progress.    10/26/2022 history reviewed.  Status post left first toe resection 10/18/2022 for osteomyelitis.  Tolerating ceftriaxone and vancomycin treating MRSA, Klebsiella, and other bacteria until 11/25/2022.  Awaiting placement, especially given his mental status and psychiatric history.  No high fever.  Pain controlled.    10/27/2022 history reviewed.  Continues on vancomycin and ceftriaxone until 11/25/2022 for left first toe osteomyelitis.  Status post resection 10/18/2022.  Awaiting placement.  No high fever.    10/31/2022 history reviewed.  Continues on antibiotics for left first toe osteomyelitis until 11/25.  Status post resection 10/18.  Confused off and on.  Awaiting placement.  No fever.    11/1/2022 history reviewed.  Tolerating  ceftriaxone and vancomycin until 11/25/2022 for left first toe osteomyelitis status post resection 10/18.  Pleasant but confused.  Awaiting placement.  No fever.  No pain.    11/2/2022 history reviewed.  On vancomycin and ceftriaxone until 11/25 for left toe osteomyelitis status post resection 10/18/2022.  Placement has been difficult as places or refusing him.  No fever.    11/3/2022 history reviewed.  Tolerating ceftriaxone and vancomycin until 11/25/2022 for left first toe osteomyelitis polymicrobial status post resection 10/18/2022.  Still a difficult placement issue.  No fever.    11/4/2022 history reviewed.  Continues on vancomycin and ceftriaxone until 11/25 for left first toe osteomyelitis polymicrobial, status post amputation and resection 10/18/2022.  More amendable to care.  No fever.  Waiting on placement.    11/7/2022 history reviewed.  Tolerating ceftriaxone and vancomycin until 11/25 for left first toe osteomyelitis status post resection 10/18.  No high fever.    11/8/2022 history reviewed.  Continues on antibiotics till 11/25 for left first toe osteomyelitis status post resection 10/18.  No fever.    11/9/2022 history reviewed.  Continues on vancomycin and ceftriaxone until 11/25 for left first toe osteomyelitis.  Pleasant but confused.  No fever.    11/10/2022 history reviewed.  Tolerating antibiotics until 1/25 for left first toe osteomyelitis.  No high fevers.  Having difficulties being placed for his IV antibiotics and wound care.  No pain.    Past Medical History:   Diagnosis Date   • Diabetes mellitus (HCC)    • GERD (gastroesophageal reflux disease)    HTN  Psychotic d/o, unspecified.    Past Surgical History:   Procedure Laterality Date   • AMPUTATION FOOT Left 10/18/2022    Procedure: PARTIAL FIRST RAY AMPUTATION LEFT;  Surgeon: Yeison Petty MD;  Location: [facility];  Service: Orthopedics;  Laterality: Left;   • EYE SURGERY     Left foot debridement/I and D x 2 9/27/22 and 9/30/22.  Left  first ray amputation 10/18/2022.    Pediatric History   Patient Parents   • Not on file     Other Topics Concern   • Not on file   Social History Narrative    Caffeine 0-1 servings per day    Patient lives at home .   Patient lives at [facility]  Former smoker, no alcohol, smokes marijuana.     family history includes Diabetes in his brother, brother, father, maternal grandfather, maternal grandmother, mother, and sister; Hypertension in his brother, brother, father, and mother.    No Known Allergies      There is no immunization history on file for this patient.    Medication:    Current Facility-Administered Medications:   •  acetaminophen (TYLENOL) tablet 650 mg, 650 mg, Oral, Q4H PRN, 650 mg at 11/08/22 1942 **OR** [DISCONTINUED] acetaminophen (TYLENOL) suppository 650 mg, 650 mg, Rectal, Q4H PRN, Yeison Petty MD  •  bisacodyl (DULCOLAX) suppository 10 mg, 10 mg, Rectal, Daily PRN, Yeison Petty MD  •  cefTRIAXone (ROCEPHIN) 2 g/100 mL 0.9% NS IVPB (MBP), 2 g, Intravenous, Q24H, Hero Ty, [facility], Last Rate: 200 mL/hr at 10/28/22 1109, 2 g at 11/10/22 1129  •  dextrose (D50W) (25 g/50 mL) IV injection 25 g, 25 g, Intravenous, Q15 Min PRN, Yeison Petty MD  •  dextrose (GLUTOSE) oral gel 15 g, 15 g, Oral, Q15 Min PRN, Yeison Petty MD  •  ferrous sulfate tablet 325 mg, 325 mg, Oral, Daily With Breakfast, Yeison Petty MD, 325 mg at 11/10/22 0821  •  glipizide (GLUCOTROL) tablet 2.5 mg, 2.5 mg, Oral, BID HANNA, Elena Ugalde PA-C, 2.5 mg at 11/10/22 0821  •  glucagon (human recombinant) (GLUCAGEN DIAGNOSTIC) injection 1 mg, 1 mg, Intramuscular, Q15 Min PRN, Yeison Petty MD  •  heparin (porcine) 5000 UNIT/ML injection 5,000 Units, 5,000 Units, Subcutaneous, Q8H, Yeison Petty MD, 5,000 Units at 11/10/22 0603  •  lisinopril (PRINIVIL,ZESTRIL) tablet 40 mg, 40 mg, Oral, Daily, Shahbaz Kuhn MD, 40 mg at 11/10/22 0821  •  magnesium sulfate 4 gram infusion - Mg less than or equal to  1mg/dL, 4 g, Intravenous, PRN **OR** magnesium sulfate 3 gram infusion (1gm x 3) - Mg 1.1 - 1.5 mg/dL, 1 g, Intravenous, PRN **OR** Magnesium Sulfate 2 gram infusion- Mg 1.6 - 1.9 mg/dL, 2 g, Intravenous, PRN, Yeison Petty MD  •  melatonin tablet 5 mg, 5 mg, Oral, Nightly PRN, Yeison Petty MD, 5 mg at 22  •  metFORMIN (GLUCOPHAGE) tablet 1,000 mg, 1,000 mg, Oral, BID With Meals, Elena Ugalde PA-C, 1,000 mg at 11/10/22 0821  •  [] HYDROmorphone (DILAUDID) injection 0.5 mg, 0.5 mg, Intravenous, Q2H PRN, 0.5 mg at 10/24/22 144 **AND** naloxone (NARCAN) injection 0.1 mg, 0.1 mg, Intravenous, Q5 Min PRN, Yeison Petty MD  •  NIFEdipine XL (PROCARDIA XL) 24 hr tablet 30 mg, 30 mg, Oral, Q24H, Elena Ugalde PA-C, 30 mg at 11/10/22 0821  •  OLANZapine (zyPREXA) tablet 5 mg, 5 mg, Oral, Daily, 5 mg at 11/10/22 0821 **AND** OLANZapine (zyPREXA) tablet 10 mg, 10 mg, Oral, Nightly, Nicole Henriquez DO, 10 mg at 22  •  OLANZapine zydis (zyPREXA) disintegrating tablet 5 mg, 5 mg, Oral, Daily PRN, Elena Ugalde PA-C  •  polyethylene glycol (MIRALAX) packet 17 g, 17 g, Oral, PRN, Yeison Petty MD, 17 g at 22  •  sennosides-docusate (PERICOLACE) 8.6-50 MG per tablet 2 tablet, 2 tablet, Oral, BID, Elena Ugalde PA-C, 2 tablet at 11/10/22 0821  •  Insert peripheral IV, , , Once **AND** sodium chloride 0.9 % flush 10 mL, 10 mL, Intravenous, PRN, Yeison Petty MD, 10 mL at 22 0435  •  sodium chloride 0.9 % flush 10 mL, 10 mL, Intravenous, Q12H, Nicole Henriquez DO, 10 mL at 11/10/22 0823  •  vancomycin (dosing per levels), , Does not apply, Daily, Hero Ty RPH  •  vancomycin in dextrose 5% 150 mL (VANCOCIN) IVPB 750 mg, 750 mg, Intravenous, Q12H, Hero Ty RP, 750 mg at 11/10/22 0333    Please refer to the medical record for a full medication list    Review of Systems:  Unable to get a reliable history as patient is responding with yes  "and no to basic questions and has a psychotic disorder.  Denies fever, chills, nausea, vomiting, diarrhea, shortness of breath, dysuria, or rashes.    Physical Exam:   Vital Signs   Temp:  [98.2 °F (36.8 °C)-98.9 °F (37.2 °C)] 98.5 °F (36.9 °C)  Heart Rate:  [71-80] 73  Resp:  [16-18] 18  BP: (149-174)/(73-87) 160/77    Temp  Min: 98.2 °F (36.8 °C)  Max: 98.9 °F (37.2 °C)  BP  Min: 149/73  Max: 174/82  Pulse  Min: 71  Max: 80  Resp  Min: 16  Max: 18  SpO2  Min: 94 %  Max: 96 %    Blood pressure 160/77, pulse 73, temperature 98.5 °F (36.9 °C), temperature source Oral, resp. rate 18, height 182.9 cm (72\"), weight 87.4 kg (192 lb 9.6 oz), SpO2 96 %.  GENERAL: Awake and alert, in minor distress. Appears older than stated age.  Resting in bed.  Eating some food.  HEENT:  Normocephalic, atraumatic.  Oropharynx without thrush.   EYES: No conjunctival injection. No icterus. EOM full.  LYMPHATICS: No lymphadenopathy of the neck or axillary or inguinal regions.   HEART: No murmur, gallop, or pericardial friction rub. Reg rate rhythm.    LUNGS: Clear to auscultation and percussion. No respiratory distress, no use of accessory muscles.  No wheezes.  ABDOMEN: Soft, nontender, nondistended. No appreciable HSM.  Bowel sounds normal.  SKIN: Warm and dry without cutaneous eruptions.  No nodules. Left foot dressing in place, right arm PICC okay placed 10/27.  Splint in place.  PSYCHIATRIC: Mental status with occasional confusion.  But overall pleasant.  Can follow commands at times.    EXT: Left foot surgical dressing in place.  No crepitus, some drainage from webspace. Normal range of motion.  NEURO: Oriented to name, nonfocal.  Follows some commands and pleasant with me.    Results Review:   I reviewed the patient's new clinical results.  I reviewed the patient's new imaging results and agree with the interpretation.  I reviewed the patient's other test results and agree with the interpretation    Results from last 7 days   Lab " Units 11/08/22  0503 11/05/22  1111   WBC 10*3/mm3 8.71 5.79   HEMOGLOBIN g/dL 8.8* 7.9*   HEMATOCRIT % 32.7* 26.4*   PLATELETS 10*3/mm3 410 388     Results from last 7 days   Lab Units 11/10/22  0627   SODIUM mmol/L 139   POTASSIUM mmol/L 4.4   CHLORIDE mmol/L 105   CO2 mmol/L 27.0   BUN mg/dL 22   CREATININE mg/dL 0.92   GLUCOSE mg/dL 95   CALCIUM mg/dL 9.1     Results from last 7 days   Lab Units 11/08/22  1012   ALK PHOS U/L 111   BILIRUBIN mg/dL <0.2   ALT (SGPT) U/L 20   AST (SGOT) U/L 14             Results from last 7 days   Lab Units 11/07/22  1127   VANCOMYCIN RM mcg/mL 24.60         Estimated Creatinine Clearance: 99 mL/min (by C-G formula based on SCr of 0.92 mg/dL).  CPK    Common Labsle 10/19/22   Creatine Kinase 119            Procalitonin Results:       Brief Urine Lab Results  (Last result in the past 365 days)      Color   Clarity   Blood   Leuk Est   Nitrite   Protein   CREAT   Urine HCG        06/12/22 2056 Yellow   Clear     Negative                    No results found for: SITE, ALLENTEST, PHART, CHH7KLP, PO2ART, JCU1RBZ, BASEEXCESS, O9AAJDWU, HGBBG, HCTABG, OXYHEMOGLOBI, METHHGBN, CARBOXYHGB, CO2CT, BAROMETRIC, MODALITY, FIO2     Microbiology:  Microbiology Results (last 10 days)     ** No results found for the last 240 hours. **        Blood and wound cultures 10/14 pending.       No results found for: TISSCXQ, CULTURES, CULTURE, BLOODCX, ANACX, BALCX, ACIDFASTCX, BODYFLDCX, CXREFLEX, FUNGUSCX, RESPCX, MRSACX, ROUTCX, BRCHWSHCLT, TISSUECX, URINECX, CMVCX, WOUNDCX, BCIDPCR, BFCULTURE, BLOODCULT(      Radiology:  Imaging Results (Last 72 Hours)     ** No results found for the last 72 hours. **          IMPRESSION:   1. Left hallux osteomyelitis after trauma/displaced fracture 9/17.  At risk for fracture not healing in the first toe given the infection, noncompliance with walking on foot, and ongoing infection.  Likely gangrene and poor wound healing related to vascular disease.  Resolving after  surgery 10/18.  2. 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  Cx with MRSA, Enterococcus faecalis, and Corynebacterium striatum.  Initially treated with Daptomycin and discharge on oral Zyvox and oral Rifampin until 10/26/22.  Cx with Raoultella ornithinolytica (sens to Cefepime, ceftriaxone) and Klebsiella.  Status post left first ray resection 10/18/2022.  Improved.  3. Lactic acidosis, related to above. Resolved.   4. Elevated inflammatory markers, related to above.  CRP 3.07 on 10/14.  CRP 3.10 on 10/26.  5. Type 2 diabetes mellitus/peripheral neuropathy.  Affects wound healing.  6. Hyperglycemia, related to above.  7. Anemia of chronic disease.  Ongoing.  8. Essential hypertension.  Controlled.  9. Gastroesophageal reflux disease.  10. Thrombocytosis.  Related to above issues.  Resolved.  11. Psychosis since April 2022 reported by the patient's sister, thought to be related to drug and alcohol use.  Was hospitalized previously at [facility].  Awaiting placement.  12. Hyponatremia, resolved.  13. H/o MRSA.  14. Hypocalcemia resolved.  15. Elevated alkaline phosphatase resolved.    Moving forward current regimen.  Discussed with nursing and family.  Main issue is placement.  Discussed with case management.    Plan:  1. Diagnostically, follow blood and wound cultures, imaging, physical examination, CBC, CMP, CRP, and vancomycin levels.   2. Therapeutically, continue ceftriaxone and Vancomycin for 6 weeks of IV antibiotics until 11/25/22.  This will cover the organisms including previous MRSA.  Vancomycin dose previously increased to 1 g IV every 12 hours.  3. Orthopedic surgery status post left first ray resection for osteomyelitis 10/18/2022.   4. Awaiting placement.  5. Continue supportive care.  PICC placement right arm 10/27/2022.    I discussed the patient's findings and my recommendations with patient and his family.  He will need to be in a  skilled facility to receive his antibiotics.    Our group would be pleased to follow this patient over the course of their hospitalization and assist with outpatient antimicrobial therapy, as indicated.  Further recommendations depend on the results of the cultures and clinical course. Discussed with patient's sister and family.  Difficult issue with compliance in terms of wound care and staying off his foot.      Case management orders: Please arrange for skilled facility IV antibiotics with ceftriaxone 2 g IV daily, vancomycin 1 g IV every 12 hours to continue until 11/25/2022.  Pharmacy to adjust dose of vancomycin based on weekly trough levels to try to maintain level between 12 and 18.  Check CBC, CMP, CRP, vancomycin trough weekly while on IV antibiotics.  Fax orders to [phone], call [phone] with final arrangements.  The treatment is for left first toe osteomyelitis with MRSA, Klebsiella, and Raoultella ornithinolytica.  Arrange for follow-up with me in 2 weeks post discharge.  Weekly PICC dressing changes.    Rayray Gordon MD  11/10/2022

## 2022-11-10 NOTE — CASE MANAGEMENT/SOCIAL WORK
Continued Stay Note  [facility]     Patient Name: Omkar Nava  MRN: [id number]  Today's Date: 11/10/2022    Admit Date: 10/14/2022    Plan: SNF   Discharge Plan     Row Name 11/10/22 1657       Plan    Plan SNF    Plan Comments Case mgt f/u. Plan is transfer to a skilled bed at [facility] when their admissions office has obtained all financial information from daughter, this will need to be completed before they give final approval.               Discharge Codes    No documentation.               Expected Discharge Date and Time     Expected Discharge Date Expected Discharge Time    Nov 18, 2022             Sonja C Kellerman, RN

## 2022-11-10 NOTE — PROGRESS NOTES
"    [facility] Medicine Services  PROGRESS NOTE    Patient Name: Omkar Nava  : 1955  MRN: [id number]    Date of Admission: 10/14/2022  Primary Care Physician: Provider, No Known    Subjective     CC:    Presented due to concerns about \"foot infection\"    HPI:   Afebrile.  No family in room today.  Denies any major issues today.  Patient remains confused.   No agitation appreciated.    ROS:    Gen - denies chills, denies fevers  CV- No chest pain, palpitations  Resp- No cough, dyspnea  GI- No N/V/D, abd pain    Objective     Vital Signs:   Temp:  [98.2 °F (36.8 °C)-98.9 °F (37.2 °C)] 98.5 °F (36.9 °C)  Heart Rate:  [71-80] 80  Resp:  [16-18] 16  BP: (144-164)/(73-87) 144/81     Physical Exam:  Constitutional: NAD, awake  HENT: NCAT, dry tongue  Respiratory: Clear to auscultation bilaterally, respiratory effort normal on room air  Cardiovascular:  s1 and s2, RRR  Gastrointestinal: Positive bowel sounds, soft, nontender, nondistended  Musculoskeletal: L foot in splint - able to wiggle toes.   Psychiatric: Appropriate affect, cooperative  Neurologic: Moves all extremities spontaneously without focal deficits. Speech clear and coherent     Results Reviewed:  LAB RESULTS:      Lab 22  0503 22  1111   WBC 8.71 5.79   HEMOGLOBIN 8.8* 7.9*   HEMATOCRIT 32.7* 26.4*   PLATELETS 410 388   NEUTROS ABS 6.49  --    IMMATURE GRANS (ABS) 0.04  --    LYMPHS ABS 1.35  --    MONOS ABS 0.57  --    EOS ABS 0.20  --    .8* 91.7         Lab 11/10/22  0627 22  0514 22  1012 22  1127 22  0417   SODIUM 139 139 138 138 141   POTASSIUM 4.4 3.8 4.3 4.3 4.4   CHLORIDE 105 106 103 104 105   CO2 27.0 24.0 27.0 21.0* 28.0   ANION GAP 7.0 9.0 8.0 13.0 8.0   BUN 22 21 23 29* 19   CREATININE 0.92 0.92 0.97 0.97 0.92   EGFR 92.3 92.3 86.6 86.6 92.3   GLUCOSE 95 90 143* 181* 105*   CALCIUM 9.1 9.0 9.2 9.1 9.2         Lab 22  1012   TOTAL PROTEIN 7.1   ALBUMIN 3.40* "   GLOBULIN 3.7   ALT (SGPT) 20   AST (SGOT) 14   BILIRUBIN <0.2   ALK PHOS 111     Brief Urine Lab Results  (Last result in the past 365 days)      Color   Clarity   Blood   Leuk Est   Nitrite   Protein   CREAT   Urine HCG        06/12/22 2056 Yellow   Clear     Negative                   Microbiology Results Abnormal     Procedure Component Value - Date/Time    Fungus Culture - Tissue, Toe, Left [[id number]] Collected: 10/18/22 1608    Lab Status: Preliminary result Specimen: Tissue from Toe, Left Updated: 11/08/22 1715     Fungus Culture No fungus isolated at 3 weeks    AFB Culture - Tissue, Toe, Left [[id number]] Collected: 10/18/22 1608    Lab Status: Preliminary result Specimen: Tissue from Toe, Left Updated: 11/08/22 1715     AFB Culture No AFB isolated at 3 weeks     AFB Stain No acid fast bacilli seen on concentrated smear    Fungus Culture - Tissue, Toe, Left [[id number]] Collected: 10/18/22 1552    Lab Status: Preliminary result Specimen: Tissue from Toe, Left Updated: 11/08/22 1701     Fungus Culture No fungus isolated at 3 weeks    AFB Culture - Tissue, Toe, Left [[id number]] Collected: 10/18/22 1552    Lab Status: Preliminary result Specimen: Tissue from Toe, Left Updated: 11/08/22 1701     AFB Culture No AFB isolated at 3 weeks     AFB Stain No acid fast bacilli seen on concentrated smear    COVID PRE-OP / PRE-PROCEDURE SCREENING ORDER (NO ISOLATION) - Swab, Nasopharynx [[id number]]  (Normal) Collected: 10/27/22 0510    Lab Status: Final result Specimen: Swab from Nasopharynx Updated: 10/27/22 0530    Narrative:      The following orders were created for panel order COVID PRE-OP / PRE-PROCEDURE SCREENING ORDER (NO ISOLATION) - Swab, Nasopharynx.  Procedure                               Abnormality         Status                     ---------                               -----------         ------                     COVID-19, ABBOTT IN-HOUS...[[id number]]  Normal              Final result                  Please view results for these tests on the individual orders.    COVID-19, ABBOTT IN-HOUSE,NASAL Swab (NO TRANSPORT MEDIA) 2 HR TAT - Swab, Nasopharynx [[id number]]  (Normal) Collected: 10/27/22 0510    Lab Status: Final result Specimen: Swab from Nasopharynx Updated: 10/27/22 0530     COVID19 Presumptive Negative    Narrative:      Fact sheet for providers: https://www.fda.gov/media/064169/download     Fact sheet for patients: https://www.fda.gov/media/983979/download    Test performed by PCR.  If inconsistent with clinical signs and symptoms patient should be tested with different authorized molecular test.    Anaerobic Culture - Tissue, Toe, Left [[id number]]  (Normal) Collected: 10/18/22 1608    Lab Status: Final result Specimen: Tissue from Toe, Left Updated: 10/23/22 0543     Anaerobic Culture No anaerobes isolated at 5 days    Anaerobic Culture - Tissue, Toe, Left [[id number]]  (Normal) Collected: 10/18/22 1552    Lab Status: Final result Specimen: Tissue from Toe, Left Updated: 10/23/22 0543     Anaerobic Culture No anaerobes isolated at 5 days    Tissue / Bone Culture - Tissue, Toe, Left [[id number]] Collected: 10/18/22 1552    Lab Status: Final result Specimen: Tissue from Toe, Left Updated: 10/21/22 0840     Tissue Culture No growth at 3 days     Gram Stain Moderate (3+) WBCs seen      No organisms seen    Blood Culture - Blood, Hand, Left [[id number]]  (Normal) Collected: 10/14/22 2115    Lab Status: Final result Specimen: Blood from Hand, Left Updated: 10/19/22 2215     Blood Culture No growth at 5 days    Blood Culture - Blood, Arm, Left [[id number]]  (Normal) Collected: 10/14/22 2115    Lab Status: Final result Specimen: Blood from Arm, Left Updated: 10/19/22 2215     Blood Culture No growth at 5 days    MRSA Screen, PCR (Inpatient) - Swab, Nares [[id number]]  (Normal) Collected: 10/14/22 0304    Lab Status: Final result Specimen: Swab from Nares Updated: 10/15/22 0741     MRSA PCR Negative    Narrative:       The negative predictive value of this diagnostic test is high and should only be used to consider de-escalating anti-MRSA therapy. A positive result may indicate colonization with MRSA and must be correlated clinically.  MRSA Negative        No radiology results from the last 24 hrs    I have reviewed the medications:  Scheduled Meds:cefTRIAXone, 2 g, Intravenous, Q24H  ferrous sulfate, 325 mg, Oral, Daily With Breakfast  glipizide, 2.5 mg, Oral, BID AC  heparin (porcine), 5,000 Units, Subcutaneous, Q8H  lisinopril, 40 mg, Oral, Daily  metFORMIN, 1,000 mg, Oral, BID With Meals  NIFEdipine XL, 30 mg, Oral, Q24H  OLANZapine, 5 mg, Oral, Daily   And  OLANZapine, 10 mg, Oral, Nightly  senna-docusate sodium, 2 tablet, Oral, BID  sodium chloride, 10 mL, Intravenous, Q12H  vancomycin (dosing per levels), , Does not apply, Daily  vancomycin, 750 mg, Intravenous, Q12H      Continuous Infusions:   PRN Meds:.•  acetaminophen **OR** [DISCONTINUED] acetaminophen  •  bisacodyl  •  dextrose  •  dextrose  •  glucagon (human recombinant)  •  magnesium sulfate **OR** magnesium sulfate in D5W 1g/100mL (PREMIX) **OR** magnesium sulfate  •  melatonin  •  [] HYDROmorphone **AND** naloxone  •  OLANZapine zydis  •  polyethylene glycol  •  Insert peripheral IV **AND** sodium chloride    Assessment & Plan     Active Hospital Problems    Diagnosis  POA   • **Acute osteomyelitis of left foot (HCC) [M86.172]  Yes   • Neurocognitive disorder [R41.9]  Unknown   • Type 2 diabetes mellitus (HCC) [E11.9]  Yes   • Essential hypertension [I10]  Yes   • Psychotic disorder (HCC) [F29]  Yes      Resolved Hospital Problems   No resolved problems to display.     Brief Hospital Course to date:  Omkar Nava is a 67 y.o. male with PMH significant for HTN, DMII and unspecified psychotic disorder. He previously worked as a  but has been unable to work for past 2-3 years and has become homeless.     His health declined in 2022 with  an acute psychotic event with subsequent psychiatric hospitalization at [facility] in [place] from June to August 2022. He was subsequently transferred to a nursing facility in [place]. On 9/17/22, daughter visited him at the nursing home and he had a significant injury to his L great toe, causing an open articular fracture. He was admitted to [facility] and treated at that time with bedside flushing and repair by podiatry. He was given Ancef and sent back to nursing facility on Keflex. Wound cultures at that time grew MRSA, however blood culture showed no growth. On 9/27/2022, he presented to [facility] in [place] due to worsening foot wound and drainage. He underwent L foot I&D and debridement on 9/30/22. A PICC was placed on 10/3/22. He was to receive Daptomycin but due to expense, this was changed to Rifampin and Linezolid and PICC was removed. He was discharged to SNF in [facility] on 10/6/22 and at some point, was transferred to [place], in order to be closer to family. Daughter visited him on 10/14/22 and became concerned about the appearance of his toe. She brought him to [facility] ED for further evaluation. ID and orthopedic surgery follow.     Osteomyelitis of left foot / L first toe (s/p amputation)  Wound culture (+) Raoultella ornithinolytica   - 10/15/22 MRI of L foot showed wound around the great toe with geographic marrow signal alteration within the distal aspect of the first metatarsal as well as both phalanges of the great toe indicative of osteomyelitis  - He is s/p L great toe amputation on 10/18/22 by Dr. Petty  - Re-evaluated by Dr. Petty on 11/7. Splint removed. Sutures remain in place. Heel-weightbearing on LLE in hard-soled shoe. Follow up with Dr. Petty in 1 week (~ 11/14/22)  - NWB to LLE. Elevated LLE. Keep splint clean and dry. SQ heparin for DVT PPX while inpatient. Discharge on ASA 325mg daily x 30 days  - Right Arm  PICC line with IV abx - Infectious Diseases - Dr. Gordon  - infectious diseases following    Iron-deficiency anemia   - Completed 3 days IV iron on 10/20/22  - hemoglobin is trending up  - on oral ferrous sulfate daily currently    Non-insulin dependent DMII  - Hgb A1c 7.0%   - Continue Metformin 1000mg BID, add Glipizide 2.5mg BID   - Continue TID accuchecks   - glucose appears well controlled    Hypertension  - discontinued Norvasc  - on lisinopril  - on Procardia XL    Neurocognitive disorder  Dementia   - Re-started olanzapine 10/15/22, was recently hospitalized at [facility] for psychiatric issues; apparently PRN Haldol was not much help, have discontinued  - Continue Olanzapine 5mg QAM and 10mg QHS  - Overall stable    Expected Discharge Location and Transportation: rehab with goal to transition to LTC. Private vehicle vs WC van   Expected Discharge Date:   11/14    DVT prophylaxis:Medical and mechanical DVT prophylaxis orders are present.     AM-PAC 6 Clicks Score (PT): 9 (11/08/22 1046)    CODE STATUS:   Code Status and Medical Interventions:   Ordered at: 10/14/22 5444     Level Of Support Discussed With:    Patient     Code Status (Patient has no pulse and is not breathing):    CPR (Attempt to Resuscitate)     Medical Interventions (Patient has pulse or is breathing):    Full Support     Wilfredo Lowe MD  11/10/22

## 2022-11-10 NOTE — PLAN OF CARE
Goal Outcome Evaluation:               Pt remains confused. Disoriented to time, place and situation. VSS. RA. Slept throughout the night. No agitation this shift.

## 2022-11-10 NOTE — PROGRESS NOTES
[facility] Medicine Services  PROGRESS NOTE    Patient Name: Omkar Nava  : 1955  MRN: [id number]    Date of Admission: 10/14/2022  Primary Care Physician: Provider, No Known    Subjective     CC:    Left Foot Wound    HPI:   Doing well today.  Sitting up in chair.  No overnight events reported.   Few hours of rest  ROS:  Gen- No fevers, chills  CV- No chest pain, palpitations  Resp- No cough, dyspnea  GI- No N/V/D, abd pain    Objective     Vital Signs:   Temp:  [97.8 °F (36.6 °C)-98.9 °F (37.2 °C)] 98.9 °F (37.2 °C)  Heart Rate:  [73-88] 80  Resp:  [14-16] 16  BP: (151-174)/(75-84) 164/84     Physical Exam:  Constitutional: up to chair, NAD  HENT: NCAT, mucous membranes moist  Respiratory: Clear to auscultation bilaterally, respiratory effort normal on room air  Cardiovascular: RRR, s1 and s2  Gastrointestinal: Positive bowel sounds, soft, nontender, nondistended  Musculoskeletal: L foot in splint - able to wiggle toes.   Psychiatric: Appropriate affect, cooperative  Neurologic: Moves all extremities spontaneously without focal deficits. Speech clear and coherent     Results Reviewed:  LAB RESULTS:      Lab 22  0503 22  1111   WBC 8.71 5.79   HEMOGLOBIN 8.8* 7.9*   HEMATOCRIT 32.7* 26.4*   PLATELETS 410 388   NEUTROS ABS 6.49  --    IMMATURE GRANS (ABS) 0.04  --    LYMPHS ABS 1.35  --    MONOS ABS 0.57  --    EOS ABS 0.20  --    .8* 91.7         Lab 22  0514 22  1012 22  1127 22  0417   SODIUM 139 138 138 141   POTASSIUM 3.8 4.3 4.3 4.4   CHLORIDE 106 103 104 105   CO2 24.0 27.0 21.0* 28.0   ANION GAP 9.0 8.0 13.0 8.0   BUN 21 23 29* 19   CREATININE 0.92 0.97 0.97 0.92   EGFR 92.3 86.6 86.6 92.3   GLUCOSE 90 143* 181* 105*   CALCIUM 9.0 9.2 9.1 9.2         Lab 22  1012   TOTAL PROTEIN 7.1   ALBUMIN 3.40*   GLOBULIN 3.7   ALT (SGPT) 20   AST (SGOT) 14   BILIRUBIN <0.2   ALK PHOS 111     Brief Urine Lab Results  (Last result in  the past 365 days)      Color   Clarity   Blood   Leuk Est   Nitrite   Protein   CREAT   Urine HCG        06/12/22 2056 Yellow   Clear     Negative                   Microbiology Results Abnormal     Procedure Component Value - Date/Time    Fungus Culture - Tissue, Toe, Left [[id number]] Collected: 10/18/22 1608    Lab Status: Preliminary result Specimen: Tissue from Toe, Left Updated: 11/08/22 1715     Fungus Culture No fungus isolated at 3 weeks    AFB Culture - Tissue, Toe, Left [[id number]] Collected: 10/18/22 1608    Lab Status: Preliminary result Specimen: Tissue from Toe, Left Updated: 11/08/22 1715     AFB Culture No AFB isolated at 3 weeks     AFB Stain No acid fast bacilli seen on concentrated smear    Fungus Culture - Tissue, Toe, Left [[id number]] Collected: 10/18/22 1552    Lab Status: Preliminary result Specimen: Tissue from Toe, Left Updated: 11/08/22 1701     Fungus Culture No fungus isolated at 3 weeks    AFB Culture - Tissue, Toe, Left [[id number]] Collected: 10/18/22 1552    Lab Status: Preliminary result Specimen: Tissue from Toe, Left Updated: 11/08/22 1701     AFB Culture No AFB isolated at 3 weeks     AFB Stain No acid fast bacilli seen on concentrated smear    COVID PRE-OP / PRE-PROCEDURE SCREENING ORDER (NO ISOLATION) - Swab, Nasopharynx [[id number]]  (Normal) Collected: 10/27/22 0510    Lab Status: Final result Specimen: Swab from Nasopharynx Updated: 10/27/22 0530    Narrative:      The following orders were created for panel order COVID PRE-OP / PRE-PROCEDURE SCREENING ORDER (NO ISOLATION) - Swab, Nasopharynx.  Procedure                               Abnormality         Status                     ---------                               -----------         ------                     COVID-19, ABBOTT IN-HOUS...[[id number]]  Normal              Final result                 Please view results for these tests on the individual orders.    COVID-19, ABBOTT IN-HOUSE,NASAL Swab (NO TRANSPORT MEDIA) 2  HR TAT - Swab, Nasopharynx [[id number]]  (Normal) Collected: 10/27/22 0510    Lab Status: Final result Specimen: Swab from Nasopharynx Updated: 10/27/22 0530     COVID19 Presumptive Negative    Narrative:      Fact sheet for providers: https://www.fda.gov/media/900998/download     Fact sheet for patients: https://www.fda.gov/media/655149/download    Test performed by PCR.  If inconsistent with clinical signs and symptoms patient should be tested with different authorized molecular test.    Anaerobic Culture - Tissue, Toe, Left [[id number]]  (Normal) Collected: 10/18/22 1608    Lab Status: Final result Specimen: Tissue from Toe, Left Updated: 10/23/22 0543     Anaerobic Culture No anaerobes isolated at 5 days    Anaerobic Culture - Tissue, Toe, Left [[id number]]  (Normal) Collected: 10/18/22 1552    Lab Status: Final result Specimen: Tissue from Toe, Left Updated: 10/23/22 0543     Anaerobic Culture No anaerobes isolated at 5 days    Tissue / Bone Culture - Tissue, Toe, Left [[id number]] Collected: 10/18/22 1552    Lab Status: Final result Specimen: Tissue from Toe, Left Updated: 10/21/22 0840     Tissue Culture No growth at 3 days     Gram Stain Moderate (3+) WBCs seen      No organisms seen    Blood Culture - Blood, Hand, Left [[id number]]  (Normal) Collected: 10/14/22 2115    Lab Status: Final result Specimen: Blood from Hand, Left Updated: 10/19/22 2215     Blood Culture No growth at 5 days    Blood Culture - Blood, Arm, Left [[id number]]  (Normal) Collected: 10/14/22 2115    Lab Status: Final result Specimen: Blood from Arm, Left Updated: 10/19/22 2215     Blood Culture No growth at 5 days    MRSA Screen, PCR (Inpatient) - Swab, Nares [[id number]]  (Normal) Collected: 10/14/22 2345    Lab Status: Final result Specimen: Swab from Nares Updated: 10/15/22 0741     MRSA PCR Negative    Narrative:      The negative predictive value of this diagnostic test is high and should only be used to consider de-escalating anti-MRSA  therapy. A positive result may indicate colonization with MRSA and must be correlated clinically.  MRSA Negative        No radiology results from the last 24 hrs    I have reviewed the medications:  Scheduled Meds:cefTRIAXone, 2 g, Intravenous, Q24H  ferrous sulfate, 325 mg, Oral, Daily With Breakfast  glipizide, 2.5 mg, Oral, BID AC  heparin (porcine), 5,000 Units, Subcutaneous, Q8H  lisinopril, 40 mg, Oral, Daily  metFORMIN, 1,000 mg, Oral, BID With Meals  NIFEdipine XL, 30 mg, Oral, Q24H  OLANZapine, 5 mg, Oral, Daily   And  OLANZapine, 10 mg, Oral, Nightly  senna-docusate sodium, 2 tablet, Oral, BID  sodium chloride, 10 mL, Intravenous, Q12H  vancomycin (dosing per levels), , Does not apply, Daily  vancomycin, 750 mg, Intravenous, Q12H      Continuous Infusions:   PRN Meds:.•  acetaminophen **OR** [DISCONTINUED] acetaminophen  •  bisacodyl  •  dextrose  •  dextrose  •  glucagon (human recombinant)  •  magnesium sulfate **OR** magnesium sulfate in D5W 1g/100mL (PREMIX) **OR** magnesium sulfate  •  melatonin  •  [] HYDROmorphone **AND** naloxone  •  OLANZapine zydis  •  polyethylene glycol  •  Insert peripheral IV **AND** sodium chloride    Assessment & Plan     Active Hospital Problems    Diagnosis  POA   • **Acute osteomyelitis of left foot (HCC) [M86.172]  Yes   • Neurocognitive disorder [R41.9]  Unknown   • Type 2 diabetes mellitus (HCC) [E11.9]  Yes   • Essential hypertension [I10]  Yes   • Psychotic disorder (HCC) [F29]  Yes      Resolved Hospital Problems   No resolved problems to display.     Brief Hospital Course to date:  Omkar Nava is a 67 y.o. male with PMH significant for HTN, DMII and unspecified psychotic disorder. He previously worked as a [employer] but has been unable to work for past 2-3 years and has become homeless.     His health declined in 2022 with an acute psychotic event with subsequent psychiatric hospitalization at [facility] from  to  August 2022. He was subsequently transferred to a nursing facility in [place]. On 9/17/22, daughter visited him at the nursing home and he had a significant injury to his L great toe, causing an open articular fracture. He was admitted to [facility] and treated at that time with bedside flushing and repair by podiatry. He was given Ancef and sent back to nursing facility on Keflex. Wound cultures at that time grew MRSA, however blood culture showed no growth. On 9/27/2022, he presented to [facility] in [place] due to worsening foot wound and drainage. He underwent L foot I&D and debridement on 9/30/22. A PICC was placed on 10/3/22. He was to receive Daptomycin but due to expense, this was changed to Rifampin and Linezolid and PICC was removed. He was discharged to [facility] in [facility] on 10/6/22 and at some point, was transferred to [place], in order to be closer to family. Daughter visited him on 10/14/22 and became concerned about the appearance of his toe. She brought him to [facility] ED for further evaluation. ID and orthopedic surgery follow.     Osteomyelitis of left foot / L first toe (s/p amputation)  Wound culture (+) Raoultella ornithinolytica   - 10/15/22 MRI of L foot showed wound around the great toe with geographic marrow signal alteration within the distal aspect of the first metatarsal as well as both phalanges of the great toe indicative of osteomyelitis  - He is s/p L great toe amputation on 10/18/22 by Dr. Petty  - Re-evaluated by Dr. Petty on 11/7. Splint removed. Sutures remain in place. Heel-weightbearing on LLE in hard-soled shoe. Follow up with Dr. Petty in 1 week (~ 11/14/22)  - NWB to LLE. Elevated LLE. Keep splint clean and dry. SQ heparin for DVT PPX while inpatient. Discharge on ASA 325mg daily x 30 days  - has PICC line with IV abx per Dr. Gordon  - infectious diseases following    Iron-deficiency anemia   - Completed 3 days IV iron on  10/20/22  - hemoglobin is trending up    Non-insulin dependent DMII  - Hgb A1c 7.0%   - Continue Metformin 1000mg BID, add Glipizide 2.5mg BID   - Continue TID accuchecks     Hypertension  - Stopped Amlodipine.   - Continue Nifedipine XL 30mg daily, continue Lisinopril 40mg PO daily     Neurocognitive disorder  Dementia   - Re-started olanzapine 10/15/22, was recently hospitalized at [facility] for psychiatric issues; apparently PRN Haldol was not much help, have discontinued  - Continue Olanzapine 5mg QAM and 10mg QHS  - Overall stable    Expected Discharge Location and Transportation: rehab with goal to transition to LTC. Private vehicle vs WC van   Expected Discharge Date:   11/14    DVT prophylaxis:Medical and mechanical DVT prophylaxis orders are present.     AM-PAC 6 Clicks Score (PT): 9 (11/08/22 1046)    CODE STATUS:   Code Status and Medical Interventions:   Ordered at: 10/14/22 2422     Level Of Support Discussed With:    Patient     Code Status (Patient has no pulse and is not breathing):    CPR (Attempt to Resuscitate)     Medical Interventions (Patient has pulse or is breathing):    Full Support     Wilfredo Lowe MD  11/09/22

## 2022-11-11 LAB
ALBUMIN SERPL-MCNC: 3.1 G/DL (ref 3.5–5.2)
ALBUMIN/GLOB SERPL: 0.7 G/DL
ALP SERPL-CCNC: 107 U/L (ref 39–117)
ALT SERPL W P-5'-P-CCNC: 15 U/L (ref 1–41)
ANION GAP SERPL CALCULATED.3IONS-SCNC: 11 MMOL/L (ref 5–15)
AST SERPL-CCNC: 17 U/L (ref 1–40)
BILIRUB SERPL-MCNC: <0.2 MG/DL (ref 0–1.2)
BUN SERPL-MCNC: 25 MG/DL (ref 8–23)
BUN/CREAT SERPL: 24.8 (ref 7–25)
CALCIUM SPEC-SCNC: 9.5 MG/DL (ref 8.6–10.5)
CHLORIDE SERPL-SCNC: 104 MMOL/L (ref 98–107)
CO2 SERPL-SCNC: 22 MMOL/L (ref 22–29)
CREAT SERPL-MCNC: 1.01 MG/DL (ref 0.76–1.27)
DEPRECATED RDW RBC AUTO: 50.3 FL (ref 37–54)
EGFRCR SERPLBLD CKD-EPI 2021: 82.5 ML/MIN/1.73
ERYTHROCYTE [DISTWIDTH] IN BLOOD BY AUTOMATED COUNT: 14.6 % (ref 12.3–15.4)
GLOBULIN UR ELPH-MCNC: 4.2 GM/DL
GLUCOSE BLDC GLUCOMTR-MCNC: 112 MG/DL (ref 70–130)
GLUCOSE BLDC GLUCOMTR-MCNC: 146 MG/DL (ref 70–130)
GLUCOSE BLDC GLUCOMTR-MCNC: 87 MG/DL (ref 70–130)
GLUCOSE SERPL-MCNC: 138 MG/DL (ref 65–99)
HCT VFR BLD AUTO: 29.4 % (ref 37.5–51)
HGB BLD-MCNC: 8.8 G/DL (ref 13–17.7)
MCH RBC QN AUTO: 28 PG (ref 26.6–33)
MCHC RBC AUTO-ENTMCNC: 29.9 G/DL (ref 31.5–35.7)
MCV RBC AUTO: 93.6 FL (ref 79–97)
PLATELET # BLD AUTO: 352 10*3/MM3 (ref 140–450)
PMV BLD AUTO: 10.4 FL (ref 6–12)
POTASSIUM SERPL-SCNC: 4.3 MMOL/L (ref 3.5–5.2)
PROT SERPL-MCNC: 7.3 G/DL (ref 6–8.5)
RBC # BLD AUTO: 3.14 10*6/MM3 (ref 4.14–5.8)
SODIUM SERPL-SCNC: 137 MMOL/L (ref 136–145)
WBC NRBC COR # BLD: 6.95 10*3/MM3 (ref 3.4–10.8)

## 2022-11-11 PROCEDURE — 25010000002 CEFTRIAXONE PER 250 MG

## 2022-11-11 PROCEDURE — 82962 GLUCOSE BLOOD TEST: CPT

## 2022-11-11 PROCEDURE — 99232 SBSQ HOSP IP/OBS MODERATE 35: CPT | Performed by: NURSE PRACTITIONER

## 2022-11-11 PROCEDURE — 85027 COMPLETE CBC AUTOMATED: CPT | Performed by: HOSPITALIST

## 2022-11-11 PROCEDURE — 80053 COMPREHEN METABOLIC PANEL: CPT | Performed by: HOSPITALIST

## 2022-11-11 PROCEDURE — 25010000002 VANCOMYCIN PER 500 MG

## 2022-11-11 PROCEDURE — 25010000002 HEPARIN (PORCINE) PER 1000 UNITS: Performed by: ORTHOPAEDIC SURGERY

## 2022-11-11 RX ADMIN — METFORMIN HYDROCHLORIDE 1000 MG: 1000 TABLET, FILM COATED ORAL at 08:31

## 2022-11-11 RX ADMIN — VANCOMYCIN HYDROCHLORIDE 750 MG: 750 INJECTION, SOLUTION INTRAVENOUS at 15:13

## 2022-11-11 RX ADMIN — Medication 10 ML: at 21:54

## 2022-11-11 RX ADMIN — GLIPIZIDE 2.5 MG: 5 TABLET ORAL at 08:25

## 2022-11-11 RX ADMIN — GLIPIZIDE 2.5 MG: 5 TABLET ORAL at 17:15

## 2022-11-11 RX ADMIN — Medication 10 ML: at 08:33

## 2022-11-11 RX ADMIN — HEPARIN SODIUM 5000 UNITS: 5000 INJECTION INTRAVENOUS; SUBCUTANEOUS at 15:13

## 2022-11-11 RX ADMIN — METFORMIN HYDROCHLORIDE 1000 MG: 1000 TABLET, FILM COATED ORAL at 17:15

## 2022-11-11 RX ADMIN — HEPARIN SODIUM 5000 UNITS: 5000 INJECTION INTRAVENOUS; SUBCUTANEOUS at 06:03

## 2022-11-11 RX ADMIN — SENNOSIDES AND DOCUSATE SODIUM 2 TABLET: 50; 8.6 TABLET ORAL at 08:31

## 2022-11-11 RX ADMIN — SODIUM CHLORIDE 2 G: 900 INJECTION INTRAVENOUS at 10:56

## 2022-11-11 RX ADMIN — OLANZAPINE 5 MG: 5 TABLET, FILM COATED ORAL at 08:30

## 2022-11-11 RX ADMIN — FERROUS SULFATE TAB 325 MG (65 MG ELEMENTAL FE) 325 MG: 325 (65 FE) TAB at 08:32

## 2022-11-11 RX ADMIN — OLANZAPINE 10 MG: 5 TABLET, FILM COATED ORAL at 21:54

## 2022-11-11 RX ADMIN — LISINOPRIL 40 MG: 40 TABLET ORAL at 08:32

## 2022-11-11 RX ADMIN — NIFEDIPINE 30 MG: 30 TABLET, FILM COATED, EXTENDED RELEASE ORAL at 08:31

## 2022-11-11 RX ADMIN — HEPARIN SODIUM 5000 UNITS: 5000 INJECTION INTRAVENOUS; SUBCUTANEOUS at 21:54

## 2022-11-11 RX ADMIN — VANCOMYCIN HYDROCHLORIDE 750 MG: 750 INJECTION, SOLUTION INTRAVENOUS at 02:42

## 2022-11-11 NOTE — SIGNIFICANT NOTE
"Pt unwitnessed fall event 11/11/2022      At approximately 1300 patient was found on floor by PCT \"scooting\" on buttocks towards room door. Patient was stopped and assessed for any wounds. No adventitious findings noted. Primary nurse, PCT, and charge nurse present at that time. Bed alarm was set in place but did not alarm. Patient was lifted into wheel chair then placed in transport sling to transfer from wheelchair to bed. Following transfer from wheelchair to bed, complete head-to-toe assessment completed with no injuries, wounds, abrasions, or adventitious findings noted. Physician and family members of the patient contacted and made aware of situation. Fall and safety huddle preformed with charge nurse and clinical manager present. Continuing to monitor and follow patient's plan of care.       "

## 2022-11-11 NOTE — PLAN OF CARE
Goal Outcome Evaluation:              Outcome Evaluation: Patient experienced unwitnessed fall without injuries or complications. Family member and physician notified, and proper documentation utilized. Patient has been calm and cooperative throughout shift, with occasional attempts to crawl out of his bed. further fall precautions utilized and patient's plan of care will be followed per md order. Will continue to monitor.    No adventitious findings noted upon assessment

## 2022-11-11 NOTE — CASE MANAGEMENT/SOCIAL WORK
Continued Stay Note  [facility]     Patient Name: Omkar Nava  MRN: [id number]  Today's Date: 11/11/2022    Admit Date: 10/14/2022    Plan: [facility]   Discharge Plan     Row Name 11/11/22 1303       Plan    Plan [facility]    Patient/Family in Agreement with Plan yes    Plan Comments Plan is [facility] pending facility getting financial information from patient's daughter. Called and left a voicemail for Teresa with [facility]. CM will continue to follow.    Final Discharge Disposition Code 04 - intermediate care facility               Discharge Codes    No documentation.               Expected Discharge Date and Time     Expected Discharge Date Expected Discharge Time    Nov 18, 2022             Hardik Bang RN

## 2022-11-11 NOTE — PROGRESS NOTES
INFECTIOUS DISEASE Progress Note    Omkar Nava  1955  [id number]    Consult: 10/15/22  Admit date: 10/14/2022    Requesting Provider: Omkar Seth MD  Evaluating physician:  Rayray Gordon MD  Reason for Consultation: left foot osteomyelitis, first toe, distal phalanx  Chief Complaint: left foot pain      Subjective   History of present illness:  Patient is a  67 y.o. male with h/o T2DM, PN, HTN, normocytic anemia, and GERD who presented to [facility] ED on 10/14 for worsening left foot pain.  The patient is a poor historian and most of the information was taken from the chart.  He tripped at his nursing facility, [facility], while running the street in flip-flops and sustained a laceration of his first webspace.  He was taken to [facility] ED on 9/17/22 and found to have a small intra-articular vertical fracture through the base the the great toe proximal phalanx. Podiatry, Dr. Ruiz, saw patient and the wound with irrigated with suture placement.  The area was dressed and he was placed in a surgical shoe.  He was given Cefazolin x 1 dose and discharged back to his facility on Keflex for 7 days.  On 9/26/22, the nursing staff at increased pain, swelling, and redness around the foot and sent him to [facility] ED on that day.  He was found to have a displaced fracture of proximal phalanx of left hallux along with abscess and gas in tissues.  He was admitted to the hospital and underwent Left foot debridement and I and D on 9/27/22 by Dr. Ruiz with culture positive for MRSA (Resistant to Cefazolin, clindamycin, erm, oxacillin, tetracycline, Bactrim and sensitive to Vancomycin, Daptomycin-KB 1, and Linezolid KB 2), Corynebacterium striatum, and Enterococcus faecalis (sens to Vanc and ampicillin).  He underwent a second debridement and I and D on 9/30.  He was given Cefazolin in ED and then changed to Daptomycin.  A PICC line was placed on 10/3 for Daptomycin infusions,  but the antibiotic was too expensive for the SNF, so he was changed to oral Zyvox and Rifampin until 10/26.  The PICC line was removed on 10/4.  His blood cultures remained negative.  He was discharged back to his nursing facility on 10/4/22.      He was sent to [facility] ED on 10/14 after nursing staff noted that his left hallux appeared necrotic.  He has had no fever, chills, shortness of breath, nausea, vomiting, diarrhea, dysuria, or worsening foot pain.  On arrival, he is afebrile and hemodynamically stable.  On 10/15, his BP went up to 203/81.  Admitting labs were WBC 7200 with 69% neutrophils, ESR 67, CRP 3.07, Hgb 9.2, PCT 0.05, lactic acid 2.3, and creatinine 1.21.  A MRSA PCR was negative.  Blood and wound cultures are pending.  An MRI of the left foot on 10/15 showed wound around the left great toe with marrow signal alteration within the distal aspect of 1st MT as well as both phalanges c/w osteomyelitis and scattered foci in soft tissues that may be foci of gas without evidence of fluid collections or abscesses. An Xray of left foot showed soft tissue swelling of 1st digit with displaced fracture of medial base of 1st proximal phalanx.  He is currently on Cefepime and Vancomycin.  ID was asked by Dr. Seth on 10/15 to evaluate and manage his antibiotic therapy.  Patient has had some issues with psychosis over the past 6 months possibly related to drug and alcohol use.  This is also interfered with his acceptance of care.    10/16/2022 history reviewed.  Patient more pleasant this morning.  No high fevers or chills.  Tolerating vancomycin and cefepime, duration to be determined.  Orthopedics following.  Previous history of MRSA.    10/17/22: history reviewed.  Patient with minimal response. Tmax 99.6.  On Vancomycin and Cefepime for left hallux osteomyelitis and surrounding infection with duration to be determined.  Awaiting evaluate by Dr. Petty as per Dr. Lieberman's note.  More cooperative.    10/18/2022 history  reviewed.  Patient awaiting a left hallux ray amputation by orthopedic surgery.  Tolerating vancomycin and cefepime.    10/19/2022 history reviewed.  The patient underwent a first toe ray resection on his left foot by Dr. Yeison Petty on 10/18/2022.  Continues on vancomycin and cefepime until 11/25/2022.  No high fevers or chills.    10/20/2022 history reviewed.  Status post left foot first ray toe resection on 10/18, with previous history of MRSA, and new infection with Raoultella ornithinolytica.  Tolerating vancomycin and ceftriaxone until 11/25/2022.  Waiting on placement.    10/24/2022 history reviewed.  No high fevers.  Status post left foot first ray toe resection on 10/18, with previous history of MRSA, and new infection with Raoultella ornithinolytica.  Continues on ceftriaxone and vancomycin until 11/25/2022.    10/25/2022 history reviewed.  Status post left foot first toe resection 10/18/2022 with cultures previously positive for MRSA, and now for Klebsiella and Raoultella.  Mental status has been a limiting factor with his psychiatric history in terms of treatment.  He is tolerating vancomycin and ceftriaxone to continue until 11/25/2022.  Placement is in progress.    10/26/2022 history reviewed.  Status post left first toe resection 10/18/2022 for osteomyelitis.  Tolerating ceftriaxone and vancomycin treating MRSA, Klebsiella, and other bacteria until 11/25/2022.  Awaiting placement, especially given his mental status and psychiatric history.  No high fever.  Pain controlled.    10/27/2022 history reviewed.  Continues on vancomycin and ceftriaxone until 11/25/2022 for left first toe osteomyelitis.  Status post resection 10/18/2022.  Awaiting placement.  No high fever.    10/31/2022 history reviewed.  Continues on antibiotics for left first toe osteomyelitis until 11/25.  Status post resection 10/18.  Confused off and on.  Awaiting placement.  No fever.    11/1/2022 history reviewed.  Tolerating  ceftriaxone and vancomycin until 11/25/2022 for left first toe osteomyelitis status post resection 10/18.  Pleasant but confused.  Awaiting placement.  No fever.  No pain.    11/2/2022 history reviewed.  On vancomycin and ceftriaxone until 11/25 for left toe osteomyelitis status post resection 10/18/2022.  Placement has been difficult as places or refusing him.  No fever.    11/3/2022 history reviewed.  Tolerating ceftriaxone and vancomycin until 11/25/2022 for left first toe osteomyelitis polymicrobial status post resection 10/18/2022.  Still a difficult placement issue.  No fever.    11/4/2022 history reviewed.  Continues on vancomycin and ceftriaxone until 11/25 for left first toe osteomyelitis polymicrobial, status post amputation and resection 10/18/2022.  More amendable to care.  No fever.  Waiting on placement.    11/7/2022 history reviewed.  Tolerating ceftriaxone and vancomycin until 11/25 for left first toe osteomyelitis status post resection 10/18.  No high fever.    11/8/2022 history reviewed.  Continues on antibiotics till 11/25 for left first toe osteomyelitis status post resection 10/18.  No fever.    11/9/2022 history reviewed.  Continues on vancomycin and ceftriaxone until 11/25 for left first toe osteomyelitis.  Pleasant but confused.  No fever.    11/10/2022 history reviewed.  Tolerating antibiotics until 11/25 for left first toe osteomyelitis.  No high fevers.  Having difficulties being placed for his IV antibiotics and wound care.  No pain.    11/11/2022 history reviewed.  Continues on vancomycin and ceftriaxone until 11/25 for left first toe osteomyelitis.  No fever.  Watching TV and comfortable.  Waiting on placement.    Past Medical History:   Diagnosis Date   • Diabetes mellitus (HCC)    • GERD (gastroesophageal reflux disease)    HTN  Psychotic d/o, unspecified.    Past Surgical History:   Procedure Laterality Date   • AMPUTATION FOOT Left 10/18/2022    Procedure: PARTIAL FIRST RAY  AMPUTATION LEFT;  Surgeon: Yeison Petty MD;  Location: [facility];  Service: Orthopedics;  Laterality: Left;   • EYE SURGERY     Left foot debridement/I and D x 2 9/27/22 and 9/30/22.  Left first ray amputation 10/18/2022.    Pediatric History   Patient Parents   • Not on file     Other Topics Concern   • Not on file   Social History Narrative    Caffeine 0-1 servings per day    Patient lives at home .   Patient [facility]  Former smoker, no alcohol, smokes marijuana.     family history includes Diabetes in his brother, brother, father, maternal grandfather, maternal grandmother, mother, and sister; Hypertension in his brother, brother, father, and mother.    No Known Allergies      There is no immunization history on file for this patient.    Medication:    Current Facility-Administered Medications:   •  acetaminophen (TYLENOL) tablet 650 mg, 650 mg, Oral, Q4H PRN, 650 mg at 11/08/22 1942 **OR** [DISCONTINUED] acetaminophen (TYLENOL) suppository 650 mg, 650 mg, Rectal, Q4H PRN, Yeison Petty MD  •  bisacodyl (DULCOLAX) suppository 10 mg, 10 mg, Rectal, Daily PRN, Yeison Petty MD  •  cefTRIAXone (ROCEPHIN) 2 g/100 mL 0.9% NS IVPB (MBP), 2 g, Intravenous, Q24H, Hero Ty, [facility], Last Rate: 200 mL/hr at 11/11/22 1056, 2 g at 11/11/22 1056  •  dextrose (D50W) (25 g/50 mL) IV injection 25 g, 25 g, Intravenous, Q15 Min PRN, Yeison Petty MD  •  dextrose (GLUTOSE) oral gel 15 g, 15 g, Oral, Q15 Min PRN, Yeison Petty MD  •  ferrous sulfate tablet 325 mg, 325 mg, Oral, Daily With Breakfast, Yeison Petty MD, 325 mg at 11/11/22 0832  •  glipizide (GLUCOTROL) tablet 2.5 mg, 2.5 mg, Oral, BID HANNA, Elena Ugalde PA-C, 2.5 mg at 11/11/22 0825  •  glucagon (human recombinant) (GLUCAGEN DIAGNOSTIC) injection 1 mg, 1 mg, Intramuscular, Q15 Min PRN, Yeison Petty MD  •  heparin (porcine) 5000 UNIT/ML injection 5,000 Units, 5,000 Units, Subcutaneous, Q8H, Yeison Petty MD, 5,000 Units at  22 1513  •  lisinopril (PRINIVIL,ZESTRIL) tablet 40 mg, 40 mg, Oral, Daily, Shahbaz Kuhn MD, 40 mg at 22 0832  •  magnesium sulfate 4 gram infusion - Mg less than or equal to 1mg/dL, 4 g, Intravenous, PRN **OR** magnesium sulfate 3 gram infusion (1gm x 3) - Mg 1.1 - 1.5 mg/dL, 1 g, Intravenous, PRN **OR** Magnesium Sulfate 2 gram infusion- Mg 1.6 - 1.9 mg/dL, 2 g, Intravenous, PRN, Yeison Petty MD  •  melatonin tablet 5 mg, 5 mg, Oral, Nightly PRN, Yeison Petty MD, 5 mg at 11/10/22 2133  •  metFORMIN (GLUCOPHAGE) tablet 1,000 mg, 1,000 mg, Oral, BID With Meals, Elena Ugalde PA-C, 1,000 mg at 2231  •  [] HYDROmorphone (DILAUDID) injection 0.5 mg, 0.5 mg, Intravenous, Q2H PRN, 0.5 mg at 10/24/22 1449 **AND** naloxone (NARCAN) injection 0.1 mg, 0.1 mg, Intravenous, Q5 Min PRN, Yeison Petty MD  •  NIFEdipine XL (PROCARDIA XL) 24 hr tablet 30 mg, 30 mg, Oral, Q24H, Elena Ugalde PA-C, 30 mg at 2231  •  OLANZapine (zyPREXA) tablet 5 mg, 5 mg, Oral, Daily, 5 mg at 2230 **AND** OLANZapine (zyPREXA) tablet 10 mg, 10 mg, Oral, Nightly, Nicole Henriquez DO, 10 mg at 11/10/22 2133  •  OLANZapine zydis (zyPREXA) disintegrating tablet 5 mg, 5 mg, Oral, Daily PRN, Elena Ugalde PA-C  •  polyethylene glycol (MIRALAX) packet 17 g, 17 g, Oral, PRN, Yeison Petty MD, 17 g at 22 0802  •  sennosides-docusate (PERICOLACE) 8.6-50 MG per tablet 2 tablet, 2 tablet, Oral, BID, Elena Ugalde PA-C, 2 tablet at 22 0831  •  Insert peripheral IV, , , Once **AND** sodium chloride 0.9 % flush 10 mL, 10 mL, Intravenous, PRN, Yeison Petty MD, 10 mL at 22 0435  •  sodium chloride 0.9 % flush 10 mL, 10 mL, Intravenous, Q12H, Nicole Henriquez DO, 10 mL at 22 0833  •  vancomycin (dosing per levels), , Does not apply, Daily, Hero Ty, [facility]  •  vancomycin in dextrose 5% 150 mL (VANCOCIN) IVPB 750 mg, 750 mg, Intravenous, Q12H, Karson  "Hero, [facility], Last Rate: 150 mL/hr at 11/11/22 1513, 750 mg at 11/11/22 1513    Please refer to the medical record for a full medication list    Review of Systems:  Unable to get a reliable history as patient is responding with yes and no to basic questions and has a psychotic disorder.  Denies fever, chills, nausea, vomiting, diarrhea, shortness of breath, dysuria, or rashes.    Physical Exam:   Vital Signs   Temp:  [97.8 °F (36.6 °C)-98.4 °F (36.9 °C)] 98.4 °F (36.9 °C)  Heart Rate:  [72-83] 73  Resp:  [18] 18  BP: (132-162)/(72-78) 162/78    Temp  Min: 97.8 °F (36.6 °C)  Max: 98.4 °F (36.9 °C)  BP  Min: 132/72  Max: 162/78  Pulse  Min: 72  Max: 83  Resp  Min: 18  Max: 18  SpO2  Min: 94 %  Max: 95 %    Blood pressure 162/78, pulse 73, temperature 98.4 °F (36.9 °C), temperature source Oral, resp. rate 18, height 182.9 cm (72\"), weight 87.4 kg (192 lb 9.6 oz), SpO2 95 %.  GENERAL: Awake and alert, in minor distress. Appears older than stated age.  Resting in bed.  Eating some food.  HEENT:  Normocephalic, atraumatic.  Oropharynx without thrush.   EYES: No conjunctival injection. No icterus. EOM full.  LYMPHATICS: No lymphadenopathy of the neck or axillary or inguinal regions.   HEART: No murmur, gallop, or pericardial friction rub. Reg rate rhythm.    LUNGS: Clear to auscultation and percussion. No respiratory distress, no use of accessory muscles.  No wheezes.  ABDOMEN: Soft, nontender, nondistended. No appreciable HSM.  Bowel sounds normal.  SKIN: Warm and dry without cutaneous eruptions.  No nodules. Left foot dressing in place, right arm PICC okay placed 10/27.  Splint in place.  No drainage.  PSYCHIATRIC: Mental status with occasional confusion.  But overall pleasant.  Can follow commands at times.    EXT: Left foot surgical dressing in place.  No crepitus, some drainage from webspace. Normal range of motion.  NEURO: Oriented to name, nonfocal.  Follows some commands and pleasant with me.    Results Review:   I " reviewed the patient's new clinical results.  I reviewed the patient's new imaging results and agree with the interpretation.  I reviewed the patient's other test results and agree with the interpretation    Results from last 7 days   Lab Units 11/11/22  0627 11/08/22  0503 11/05/22  1111   WBC 10*3/mm3 6.95 8.71 5.79   HEMOGLOBIN g/dL 8.8* 8.8* 7.9*   HEMATOCRIT % 29.4* 32.7* 26.4*   PLATELETS 10*3/mm3 352 410 388     Results from last 7 days   Lab Units 11/11/22  0627   SODIUM mmol/L 137   POTASSIUM mmol/L 4.3   CHLORIDE mmol/L 104   CO2 mmol/L 22.0   BUN mg/dL 25*   CREATININE mg/dL 1.01   GLUCOSE mg/dL 138*   CALCIUM mg/dL 9.5     Results from last 7 days   Lab Units 11/11/22 0627   ALK PHOS U/L 107   BILIRUBIN mg/dL <0.2   ALT (SGPT) U/L 15   AST (SGOT) U/L 17             Results from last 7 days   Lab Units 11/07/22  1127   VANCOMYCIN RM mcg/mL 24.60         Estimated Creatinine Clearance: 90.1 mL/min (by C-G formula based on SCr of 1.01 mg/dL).  CPK    Common Labsle 10/19/22   Creatine Kinase 119            Procalitonin Results:       Brief Urine Lab Results  (Last result in the past 365 days)      Color   Clarity   Blood   Leuk Est   Nitrite   Protein   CREAT   Urine HCG        06/12/22 2056 Yellow   Clear     Negative                    No results found for: SITE, ALLENTEST, PHART, CEO0MKU, PO2ART, SUM6TTV, BASEEXCESS, F4VWSBSH, HGBBG, HCTABG, OXYHEMOGLOBI, METHHGBN, CARBOXYHGB, CO2CT, BAROMETRIC, MODALITY, FIO2     Microbiology:  Microbiology Results (last 10 days)     ** No results found for the last 240 hours. **        Blood and wound cultures 10/14 pending.       No results found for: TISSCXQ, CULTURES, CULTURE, BLOODCX, ANACX, BALCX, ACIDFASTCX, BODYFLDCX, CXREFLEX, FUNGUSCX, RESPCX, MRSACX, ROUTCX, BRCHWSHCLT, TISSUECX, URINECX, CMVCX, WOUNDCX, BCIDPCR, BFCULTURE, BLOODCULT(      Radiology:  Imaging Results (Last 72 Hours)     ** No results found for the last 72 hours. **          IMPRESSION:    1. Left hallux osteomyelitis after trauma/displaced fracture 9/17.  At risk for fracture not healing in the first toe given the infection, noncompliance with walking on foot, and ongoing infection.  Likely gangrene and poor wound healing related to vascular disease.  Resolving after surgery 10/18.  2. 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  Cx with MRSA, Enterococcus faecalis, and Corynebacterium striatum.  Initially treated with Daptomycin and discharge on oral Zyvox and oral Rifampin until 10/26/22.  Cx with Raoultella ornithinolytica (sens to Cefepime, ceftriaxone) and Klebsiella.  Status post left first ray resection 10/18/2022.  Improved.  3. Lactic acidosis, related to above. Resolved.   4. Elevated inflammatory markers, related to above.  CRP 3.07 on 10/14.  CRP 3.10 on 10/26.  5. Type 2 diabetes mellitus/peripheral neuropathy.  Affects wound healing.  6. Hyperglycemia, related to above.  7. Anemia of chronic disease.  Ongoing.  8. Essential hypertension.  Controlled.  9. Gastroesophageal reflux disease.  10. Thrombocytosis.  Related to above issues.  Resolved.  11. Psychosis since April 2022 reported by the patient's sister, thought to be related to drug and alcohol use.  Was hospitalized previously at [facility].  Awaiting placement.  12. Hyponatremia, resolved.  13. H/o MRSA.  14. Hypocalcemia resolved.  15. Elevated alkaline phosphatase resolved.    Tolerating current regimen.  Discussed previously with nursing and family.  Main issue is placement.  Discussed with case management.    Plan:  1. Diagnostically, follow blood and wound cultures, imaging, physical examination, CBC, CMP, CRP, and vancomycin levels.   2. Therapeutically, continue ceftriaxone and Vancomycin for 6 weeks of IV antibiotics until 11/25/22.  This will cover the organisms including previous MRSA.  Vancomycin dose previously increased to 1 g IV every 12  hours.  3. Orthopedic surgery status post left first ray resection for osteomyelitis 10/18/2022.   4. Awaiting placement.  5. Continue supportive care.  PICC placement right arm 10/27/2022.    I discussed the patient's findings and my recommendations with patient and his family.  He will need to be in a skilled facility to receive his antibiotics.    Our group would be pleased to follow this patient over the course of their hospitalization and assist with outpatient antimicrobial therapy, as indicated.  Further recommendations depend on the results of the cultures and clinical course. Discussed with patient's sister and family.  Difficult issue with compliance in terms of wound care and staying off his foot.  See next on Monday, call sooner if needed.    Case management orders: Please arrange for skilled facility IV antibiotics with ceftriaxone 2 g IV daily, vancomycin 1 g IV every 12 hours to continue until 11/25/2022.  Pharmacy to adjust dose of vancomycin based on weekly trough levels to try to maintain level between 12 and 18.  Check CBC, CMP, CRP, vancomycin trough weekly while on IV antibiotics.  Fax orders to [phone], call [phone] with final arrangements.  The treatment is for left first toe osteomyelitis with MRSA, Klebsiella, and Raoultella ornithinolytica.  Arrange for follow-up with me in 2 weeks post discharge.  Weekly PICC dressing changes.    Rayray Gordon MD  11/11/2022

## 2022-11-11 NOTE — PROGRESS NOTES
"    [facility] Medicine Services  PROGRESS NOTE    Patient Name: Omkar Nava  : 1955  MRN: [id number]    Date of Admission: 10/14/2022  Primary Care Physician: Provider, No Known    Subjective     CC:    Presented due to concerns about \"foot infection\"    HPI:     Patient seen resting up in bed awake and alert.  No acute distress.  No visitors at bedside.  Essentially blind per patient.  Oriented x3.  Denies pain, nausea or vomiting.  Left foot dressing in place with an Ace wrap and boot.  Follows commands.  Denies complaint.  Pending placement.      ROS:  Gen - denies chills, denies fevers  CV- No chest pain, palpitations  Resp- No cough, dyspnea  GI- No N/V/D, abd pain    Objective     Vital Signs:   Temp:  [97.8 °F (36.6 °C)-98.5 °F (36.9 °C)] 98.4 °F (36.9 °C)  Heart Rate:  [72-83] 73  Resp:  [16-18] 18  BP: (132-162)/(72-81) 162/78     Physical Exam:  Constitutional: No acute distress, awake, alert.  Resting up in bed.  Partially blind.  HENT: NCAT, mucous membranes moist  Respiratory: Clear to auscultation bilaterally, respiratory effort normal on room air with sats 95%.  Cardiovascular: RRR, no murmurs, rubs, or gallops  Gastrointestinal: Positive bowel sounds, soft, nontender, nondistended  Musculoskeletal: No bilateral ankle edema.  TREJO spontaneously.  Psychiatric: Appropriate affect, cooperative  Neurologic: Oriented x 3, strength symmetric in all extremities, Cranial Nerves grossly intact to confrontation, speech clear and appropriate.  Follows commands.  Skin: No rashes.  Right upper extremity PICC line in place with dressing dry and intact.      Results Reviewed:  LAB RESULTS:      Lab 22  0627 22  0503 22  1111   WBC 6.95 8.71 5.79   HEMOGLOBIN 8.8* 8.8* 7.9*   HEMATOCRIT 29.4* 32.7* 26.4*   PLATELETS 352 410 388   NEUTROS ABS  --  6.49  --    IMMATURE GRANS (ABS)  --  0.04  --    LYMPHS ABS  --  1.35  --    MONOS ABS  --  0.57  --    EOS ABS  --  " 0.20  --    MCV 93.6 103.8* 91.7         Lab 11/11/22  0627 11/10/22  0627 11/09/22  0514 11/08/22  1012 11/07/22  1127   SODIUM 137 139 139 138 138   POTASSIUM 4.3 4.4 3.8 4.3 4.3   CHLORIDE 104 105 106 103 104   CO2 22.0 27.0 24.0 27.0 21.0*   ANION GAP 11.0 7.0 9.0 8.0 13.0   BUN 25* 22 21 23 29*   CREATININE 1.01 0.92 0.92 0.97 0.97   EGFR 82.5 92.3 92.3 86.6 86.6   GLUCOSE 138* 95 90 143* 181*   CALCIUM 9.5 9.1 9.0 9.2 9.1         Lab 11/11/22  0627 11/08/22  1012   TOTAL PROTEIN 7.3 7.1   ALBUMIN 3.10* 3.40*   GLOBULIN 4.2 3.7   ALT (SGPT) 15 20   AST (SGOT) 17 14   BILIRUBIN <0.2 <0.2   ALK PHOS 107 111     Brief Urine Lab Results  (Last result in the past 365 days)      Color   Clarity   Blood   Leuk Est   Nitrite   Protein   CREAT   Urine HCG        06/12/22 2056 Yellow   Clear     Negative                   Microbiology Results Abnormal     Procedure Component Value - Date/Time    Fungus Culture - Tissue, Toe, Left [[id number]] Collected: 10/18/22 1608    Lab Status: Preliminary result Specimen: Tissue from Toe, Left Updated: 11/08/22 1715     Fungus Culture No fungus isolated at 3 weeks    AFB Culture - Tissue, Toe, Left [[id number]] Collected: 10/18/22 1608    Lab Status: Preliminary result Specimen: Tissue from Toe, Left Updated: 11/08/22 1715     AFB Culture No AFB isolated at 3 weeks     AFB Stain No acid fast bacilli seen on concentrated smear    Fungus Culture - Tissue, Toe, Left [[id number]] Collected: 10/18/22 1552    Lab Status: Preliminary result Specimen: Tissue from Toe, Left Updated: 11/08/22 1701     Fungus Culture No fungus isolated at 3 weeks    AFB Culture - Tissue, Toe, Left [[id number]] Collected: 10/18/22 1552    Lab Status: Preliminary result Specimen: Tissue from Toe, Left Updated: 11/08/22 1701     AFB Culture No AFB isolated at 3 weeks     AFB Stain No acid fast bacilli seen on concentrated smear    COVID PRE-OP / PRE-PROCEDURE SCREENING ORDER (NO ISOLATION) - Swab, Nasopharynx  [[id number]]  (Normal) Collected: 10/27/22 0510    Lab Status: Final result Specimen: Swab from Nasopharynx Updated: 10/27/22 0530    Narrative:      The following orders were created for panel order COVID PRE-OP / PRE-PROCEDURE SCREENING ORDER (NO ISOLATION) - Swab, Nasopharynx.  Procedure                               Abnormality         Status                     ---------                               -----------         ------                     COVID-19, ABBOTT IN-HOUS...[[id number]]  Normal              Final result                 Please view results for these tests on the individual orders.    COVID-19, ABBOTT IN-HOUSE,NASAL Swab (NO TRANSPORT MEDIA) 2 HR TAT - Swab, Nasopharynx [[id number]]  (Normal) Collected: 10/27/22 0510    Lab Status: Final result Specimen: Swab from Nasopharynx Updated: 10/27/22 0530     COVID19 Presumptive Negative    Narrative:      Fact sheet for providers: https://www.fda.gov/media/950989/download     Fact sheet for patients: https://www.fda.gov/media/660349/download    Test performed by PCR.  If inconsistent with clinical signs and symptoms patient should be tested with different authorized molecular test.    Anaerobic Culture - Tissue, Toe, Left [[id number]]  (Normal) Collected: 10/18/22 1608    Lab Status: Final result Specimen: Tissue from Toe, Left Updated: 10/23/22 0543     Anaerobic Culture No anaerobes isolated at 5 days    Anaerobic Culture - Tissue, Toe, Left [[id number]]  (Normal) Collected: 10/18/22 1552    Lab Status: Final result Specimen: Tissue from Toe, Left Updated: 10/23/22 0543     Anaerobic Culture No anaerobes isolated at 5 days    Tissue / Bone Culture - Tissue, Toe, Left [[id number]] Collected: 10/18/22 1552    Lab Status: Final result Specimen: Tissue from Toe, Left Updated: 10/21/22 0840     Tissue Culture No growth at 3 days     Gram Stain Moderate (3+) WBCs seen      No organisms seen    Blood Culture - Blood, Hand, Left [[id number]]  (Normal) Collected:  10/14/22 2115    Lab Status: Final result Specimen: Blood from Hand, Left Updated: 10/19/22 2215     Blood Culture No growth at 5 days    Blood Culture - Blood, Arm, Left [[id number]]  (Normal) Collected: 10/14/22 2115    Lab Status: Final result Specimen: Blood from Arm, Left Updated: 10/19/22 2215     Blood Culture No growth at 5 days    MRSA Screen, PCR (Inpatient) - Swab, Nares [[id number]]  (Normal) Collected: 10/14/22 2345    Lab Status: Final result Specimen: Swab from Nares Updated: 10/15/22 0741     MRSA PCR Negative    Narrative:      The negative predictive value of this diagnostic test is high and should only be used to consider de-escalating anti-MRSA therapy. A positive result may indicate colonization with MRSA and must be correlated clinically.  MRSA Negative        No radiology results from the last 24 hrs    I have reviewed the medications:  Scheduled Meds:cefTRIAXone, 2 g, Intravenous, Q24H  ferrous sulfate, 325 mg, Oral, Daily With Breakfast  glipizide, 2.5 mg, Oral, BID AC  heparin (porcine), 5,000 Units, Subcutaneous, Q8H  lisinopril, 40 mg, Oral, Daily  metFORMIN, 1,000 mg, Oral, BID With Meals  NIFEdipine XL, 30 mg, Oral, Q24H  OLANZapine, 5 mg, Oral, Daily   And  OLANZapine, 10 mg, Oral, Nightly  senna-docusate sodium, 2 tablet, Oral, BID  sodium chloride, 10 mL, Intravenous, Q12H  vancomycin (dosing per levels), , Does not apply, Daily  vancomycin, 750 mg, Intravenous, Q12H      Continuous Infusions:   PRN Meds:.•  acetaminophen **OR** [DISCONTINUED] acetaminophen  •  bisacodyl  •  dextrose  •  dextrose  •  glucagon (human recombinant)  •  magnesium sulfate **OR** magnesium sulfate in D5W 1g/100mL (PREMIX) **OR** magnesium sulfate  •  melatonin  •  [] HYDROmorphone **AND** naloxone  •  OLANZapine zydis  •  polyethylene glycol  •  Insert peripheral IV **AND** sodium chloride    Assessment & Plan     Active Hospital Problems    Diagnosis  POA   • **Acute osteomyelitis of left foot (HCC)  [M86.172]  Yes   • Neurocognitive disorder [R41.9]  Unknown   • Type 2 diabetes mellitus (HCC) [E11.9]  Yes   • Essential hypertension [I10]  Yes   • Psychotic disorder (HCC) [F29]  Yes      Resolved Hospital Problems   No resolved problems to display.     Brief Hospital Course to date:  Omkar Nava is a 67 y.o. male with PMH significant for HTN, DMII and unspecified psychotic disorder. He previously worked as a  but has been unable to work for past 2-3 years and has become homeless.     His health declined in June 2022 with an acute psychotic event with subsequent psychiatric hospitalization at [facility] in [facility] from June to August 2022. He was subsequently transferred to a nursing facility in [place]. On 9/17/22, daughter visited him at the nursing home and he had a significant injury to his L great toe, causing an open articular fracture. He was admitted to [facility] and treated at that time with bedside flushing and repair by podiatry. He was given Ancef and sent back to nursing facility on Keflex. Wound cultures at that time grew MRSA, however blood culture showed no growth. On 9/27/2022, he presented to [facility] in [place] due to worsening foot wound and drainage. He underwent L foot I&D and debridement on 9/30/22. A PICC was placed on 10/3/22. He was to receive Daptomycin but due to expense, this was changed to Rifampin and Linezolid and PICC was removed. He was discharged to SNF in [facility] on 10/6/22 and at some point, was transferred to [place], in order to be closer to family. Daughter visited him on 10/14/22 and became concerned about the appearance of his toe. She brought him to [facility] ED for further evaluation. ID and orthopedic surgery follow.     This patient's problems and plans were partially entered by my partner and updated as appropriate by me 11/11/22.    Assessment/Plan:  Pt is new to me today      Osteomyelitis of left foot / L first toe (s/p amputation)  Wound culture (+) Raoultella ornithinolytica   - 10/15/22 MRI of L foot showed wound around the great toe with geographic marrow signal alteration within the distal aspect of the first metatarsal as well as both phalanges of the great toe indicative of osteomyelitis  - He is s/p L great toe amputation on 10/18/22 by Dr. Petty  - Re-evaluated by Dr. Petty on 11/7. Splint removed. Sutures remain in place. Heel-weightbearing on LLE in hard-soled shoe. Follow up with Dr. Petty in 1 week (~ 11/14/22)  - NWB to LLE. Elevated LLE. Keep splint clean and dry. SQ heparin for DVT PPX while inpatient. Discharge on ASA 325mg daily x 30 days  - Right Arm PICC line with IV abx - Infectious Diseases - Dr. Gordon  - infectious diseases following    Iron-deficiency anemia   - Completed 3 days IV iron on 10/20/22  - hemoglobin is trending up  - on oral ferrous sulfate daily currently    Non-insulin dependent DMII  - Hgb A1c 7.0%   - Continue Metformin 1000mg BID, add Glipizide 2.5mg BID   - Continue TID accuchecks   - glucose appears well controlled.  Continue current regimen and monitor.    Hypertension  - discontinued Norvasc  - on lisinopril  - on Procardia XL.  BP stable.    Neurocognitive disorder  Dementia   - Re-started olanzapine 10/15/22, was recently hospitalized at [facility] for psychiatric issues; apparently PRN Haldol was not much help, have discontinued  - Continue Olanzapine 5mg QAM and 10mg QHS  - Overall stable    Expected Discharge Location and Transportation: rehab with goal to transition to LTC. Private vehicle vs WC van   Expected Discharge Date:   11/14    DVT prophylaxis:Medical and mechanical DVT prophylaxis orders are present.     AM-PAC 6 Clicks Score (PT): 18 (11/11/22 0800)    CODE STATUS:   Code Status and Medical Interventions:   Ordered at: 10/14/22 2230     Level Of Support Discussed With:    Patient     Code Status (Patient has no pulse  and is not breathing):    CPR (Attempt to Resuscitate)     Medical Interventions (Patient has pulse or is breathing):    Full Support     Kya Adame, APRN  11/11/22

## 2022-11-11 NOTE — PLAN OF CARE
Patient with No changes overnight. VSS. He slept between care rounds. IV antibiotics continue. Awaiting placement.

## 2022-11-12 LAB
ANION GAP SERPL CALCULATED.3IONS-SCNC: 9 MMOL/L (ref 5–15)
BUN SERPL-MCNC: 24 MG/DL (ref 8–23)
BUN/CREAT SERPL: 28.2 (ref 7–25)
CALCIUM SPEC-SCNC: 9.3 MG/DL (ref 8.6–10.5)
CHLORIDE SERPL-SCNC: 104 MMOL/L (ref 98–107)
CO2 SERPL-SCNC: 26 MMOL/L (ref 22–29)
CREAT SERPL-MCNC: 0.85 MG/DL (ref 0.76–1.27)
EGFRCR SERPLBLD CKD-EPI 2021: 96.4 ML/MIN/1.73
GLUCOSE BLDC GLUCOMTR-MCNC: 129 MG/DL (ref 70–130)
GLUCOSE BLDC GLUCOMTR-MCNC: 93 MG/DL (ref 70–130)
GLUCOSE BLDC GLUCOMTR-MCNC: 94 MG/DL (ref 70–130)
GLUCOSE SERPL-MCNC: 139 MG/DL (ref 65–99)
POTASSIUM SERPL-SCNC: 4 MMOL/L (ref 3.5–5.2)
SODIUM SERPL-SCNC: 139 MMOL/L (ref 136–145)

## 2022-11-12 PROCEDURE — 25010000002 HEPARIN (PORCINE) PER 1000 UNITS: Performed by: ORTHOPAEDIC SURGERY

## 2022-11-12 PROCEDURE — 99231 SBSQ HOSP IP/OBS SF/LOW 25: CPT | Performed by: HOSPITALIST

## 2022-11-12 PROCEDURE — 25010000002 VANCOMYCIN PER 500 MG

## 2022-11-12 PROCEDURE — 97535 SELF CARE MNGMENT TRAINING: CPT

## 2022-11-12 PROCEDURE — 97530 THERAPEUTIC ACTIVITIES: CPT

## 2022-11-12 PROCEDURE — 25010000002 CEFTRIAXONE PER 250 MG

## 2022-11-12 PROCEDURE — 80048 BASIC METABOLIC PNL TOTAL CA: CPT

## 2022-11-12 PROCEDURE — 82962 GLUCOSE BLOOD TEST: CPT

## 2022-11-12 RX ORDER — ZIPRASIDONE MESYLATE 20 MG/ML
10 INJECTION, POWDER, LYOPHILIZED, FOR SOLUTION INTRAMUSCULAR EVERY 4 HOURS PRN
Status: DISCONTINUED | OUTPATIENT
Start: 2022-11-12 | End: 2022-11-15

## 2022-11-12 RX ADMIN — METFORMIN HYDROCHLORIDE 1000 MG: 1000 TABLET, FILM COATED ORAL at 18:07

## 2022-11-12 RX ADMIN — HEPARIN SODIUM 5000 UNITS: 5000 INJECTION INTRAVENOUS; SUBCUTANEOUS at 06:37

## 2022-11-12 RX ADMIN — OLANZAPINE 5 MG: 5 TABLET, FILM COATED ORAL at 08:29

## 2022-11-12 RX ADMIN — VANCOMYCIN HYDROCHLORIDE 750 MG: 750 INJECTION, SOLUTION INTRAVENOUS at 03:41

## 2022-11-12 RX ADMIN — Medication 10 ML: at 08:46

## 2022-11-12 RX ADMIN — VANCOMYCIN HYDROCHLORIDE 750 MG: 750 INJECTION, SOLUTION INTRAVENOUS at 15:23

## 2022-11-12 RX ADMIN — SENNOSIDES AND DOCUSATE SODIUM 2 TABLET: 50; 8.6 TABLET ORAL at 08:28

## 2022-11-12 RX ADMIN — GLIPIZIDE 2.5 MG: 5 TABLET ORAL at 18:07

## 2022-11-12 RX ADMIN — FERROUS SULFATE TAB 325 MG (65 MG ELEMENTAL FE) 325 MG: 325 (65 FE) TAB at 08:28

## 2022-11-12 RX ADMIN — METFORMIN HYDROCHLORIDE 1000 MG: 1000 TABLET, FILM COATED ORAL at 08:28

## 2022-11-12 RX ADMIN — HEPARIN SODIUM 5000 UNITS: 5000 INJECTION INTRAVENOUS; SUBCUTANEOUS at 15:23

## 2022-11-12 RX ADMIN — SODIUM CHLORIDE 2 G: 900 INJECTION INTRAVENOUS at 11:03

## 2022-11-12 RX ADMIN — GLIPIZIDE 2.5 MG: 5 TABLET ORAL at 06:36

## 2022-11-12 RX ADMIN — LISINOPRIL 40 MG: 40 TABLET ORAL at 08:28

## 2022-11-12 RX ADMIN — NIFEDIPINE 30 MG: 30 TABLET, FILM COATED, EXTENDED RELEASE ORAL at 08:28

## 2022-11-12 NOTE — PROGRESS NOTES
Pharmacy Consult-Vancomycin Dosing  Omkar Nava is a  67 y.o. male receiving vancomycin therapy.     Indication: L foot osteomyelitis   Consulting Provider: Hospitalist  ID Consult: SARAH Gordon     Goal AUC: 400 - 600 mg/L*hr    Current Antimicrobial Therapy  Anti-Infectives (From admission, onward)      Ordered     Dose/Rate Route Frequency Start Stop    11/07/22 0713  vancomycin in dextrose 5% 150 mL (VANCOCIN) IVPB 750 mg        Ordering Provider: Hero Ty RPH    750 mg  over 60 Minutes Intravenous Every 12 Hours 11/07/22 1500 11/26/22 1459    10/31/22 1407  vancomycin in dextrose 5% 150 mL (VANCOCIN) IVPB 750 mg        Ordering Provider: Carolina Arreaga RPH    750 mg  over 60 Minutes Intravenous Every 12 Hours 10/31/22 1500 11/07/22 0534    10/27/22 0851  cefTRIAXone (ROCEPHIN) 2 g/100 mL 0.9% NS IVPB (MBP)        Ordering Provider: Nicole Henriquez DO    2 g Intravenous Every 24 Hours 10/27/22 0000 11/19/22 2359    10/27/22 0851  vancomycin (VANCOCIN) 1-5 GM/200ML-% IVPB        Ordering Provider: Nicole Henriquez DO    1,000 mg Intravenous Every 12 Hours 10/27/22 0000 11/19/22 2359    10/20/22 1010  cefTRIAXone (ROCEPHIN) 2 g/100 mL 0.9% NS IVPB (MBP)        Ordering Provider: Hero Ty RPH    2 g  over 30 Minutes Intravenous Every 24 Hours 10/20/22 1100 11/26/22 1059    10/14/22 2306  cefepime (MAXIPIME) 2 g/100 mL 0.9% NS (mbp)        Ordering Provider: Yeison Petty MD    2 g  over 4 Hours Intravenous Every 8 Hours 10/15/22 0800 10/20/22 0621    10/14/22 2306  cefepime (MAXIPIME) 2 g/100 mL 0.9% NS (mbp)        Ordering Provider: Norma Wei APRN    2 g  200 mL/hr over 30 Minutes Intravenous Once 10/14/22 2308 10/15/22 0142    10/14/22 2306  Pharmacy to dose vancomycin        Ordering Provider: Yeison Petty MD     Does not apply Continuous PRN 10/14/22 2306 10/19/22 2305    10/14/22 2029  vancomycin 1750 mg/500 mL 0.9% NS IVPB ([facility])        Ordering Provider: Omkar Seth MD  "   20 mg/kg × 91.2 kg Intravenous Once 10/14/22 2031 10/14/22 2157    10/14/22 2029  cefepime (MAXIPIME) 2 g/100 mL 0.9% NS (mbp)        Ordering Provider: Omkar eSth MD    2 g  200 mL/hr over 30 Minutes Intravenous Once 10/14/22 2031 10/14/22 2155          Allergies  Allergies as of 10/14/2022    (No Known Allergies)     Labs  Results from last 7 days   Lab Units 11/12/22  0429 11/11/22  0627 11/10/22  0627   BUN mg/dL 24* 25* 22   CREATININE mg/dL 0.85 1.01 0.92     Results from last 7 days   Lab Units 11/11/22 0627 11/08/22  0503   WBC 10*3/mm3 6.95 8.71     Evaluation of Dosing     Last Dose Received in the ED/Outside Facility: 1750 mg (19mg/kg) on 10/14 @ 2157  Is Patient on Dialysis or Renal Replacement: No    Ht - 182.9 cm (72\")  Wt - 87.4 kg (192 lb 9.6 oz)    Estimated Creatinine Clearance: 107.1 mL/min (by C-G formula based on SCr of 0.85 mg/dL).    Intake & Output (last 3 days)         11/09 0701  11/10 0700 11/10 0701 11/11 0700 11/11 0701  11/12 0700 11/12 0701  11/13 0700    P.O. 1080 360      IV Piggyback   700 100    Total Intake(mL/kg) 1080 (12.4) 360 (4.1) 700 (8) 100 (1.1)    Urine (mL/kg/hr) 1900 (0.9) 1775 (0.8) 2100 (1)     Stool 0  0     Total Output 1900 1775 2100     Net -820 -1415 -1400 +100            Urine Unmeasured Occurrence   3 x 1 x    Stool Unmeasured Occurrence 1 x  4 x 1 x          Microbiology and Radiology  Microbiology Results (last 10 days)       ** No results found for the last 240 hours. **          Reported Vancomycin Levels  Results from last 7 days   Lab Units 11/07/22  1127   VANCOMYCIN RM mcg/mL 24.60             [employer] AUC Calculation:    Current AUC: 533 mg/L*hr    Predicted Steady State AUC on Current Dose: 553 mg/L*hr  _________________________________    Predicted Steady State AUC on New Dose: -- mg/L*hr    Assessment/Plan:    Pharmacy to dose vancomycin for L foot OM.   Patient currently on a maintenance dose of vancomycin 750mg (~8 mg/kg) IV " q12h.  11/7 vancomycin trough 24.6 mcg/mL, correlating with predicted steady state AUC of 553 mg/L*hr--therapeutic.   Continue current regimen and reassess clearance by trough level on Monday, 11/14.   Afebrile, Scr stable, UOP remains stable if being charted correctly.    Pharmacy will continue to monitor renal function, cultures and sensitivities, and clinical status to adjust regimen as necessary.    Thank you.  Jamal Cespedes, PharmD  Pharmacy Resident  11/12/2022  12:38 EST

## 2022-11-12 NOTE — PROGRESS NOTES
INFECTIOUS DISEASE Progress Note    Omkar Nava  1955  [id number]    Consult: 10/15/22  Admit date: 10/14/2022    Requesting Provider: Omkar Seth MD  Evaluating physician:  Rayray Gordon MD  Reason for Consultation: left foot osteomyelitis, first toe, distal phalanx  Chief Complaint: left foot pain      Subjective   History of present illness:  Patient is a  67 y.o. male with h/o T2DM, PN, HTN, normocytic anemia, and GERD who presented to [facility] ED on 10/14 for worsening left foot pain.  The patient is a poor historian and most of the information was taken from the chart.  He tripped at his nursing facility, [facility], while running the street in flip-flops and sustained a laceration of his first webspace.  He was taken to [facility] ED on 9/17/22 and found to have a small intra-articular vertical fracture through the base the the great toe proximal phalanx. Podiatry, Dr. Ruiz, saw patient and the wound with irrigated with suture placement.  The area was dressed and he was placed in a surgical shoe.  He was given Cefazolin x 1 dose and discharged back to his facility on Keflex for 7 days.  On 9/26/22, the nursing staff at increased pain, swelling, and redness around the foot and sent him to [facility] ED on that day.  He was found to have a displaced fracture of proximal phalanx of left hallux along with abscess and gas in tissues.  He was admitted to the hospital and underwent Left foot debridement and I and D on 9/27/22 by Dr. Ruiz with culture positive for MRSA (Resistant to Cefazolin, clindamycin, erm, oxacillin, tetracycline, Bactrim and sensitive to Vancomycin, Daptomycin-KB 1, and Linezolid KB 2), Corynebacterium striatum, and Enterococcus faecalis (sens to Vanc and ampicillin).  He underwent a second debridement and I and D on 9/30.  He was given Cefazolin in ED and then changed to Daptomycin.  A PICC line was placed on 10/3 for Daptomycin infusions,  but the antibiotic was too expensive for the SNF, so he was changed to oral Zyvox and Rifampin until 10/26.  The PICC line was removed on 10/4.  His blood cultures remained negative.  He was discharged back to his nursing facility on 10/4/22.      He was sent to [facility] ED on 10/14 after nursing staff noted that his left hallux appeared necrotic.  He has had no fever, chills, shortness of breath, nausea, vomiting, diarrhea, dysuria, or worsening foot pain.  On arrival, he is afebrile and hemodynamically stable.  On 10/15, his BP went up to 203/81.  Admitting labs were WBC 7200 with 69% neutrophils, ESR 67, CRP 3.07, Hgb 9.2, PCT 0.05, lactic acid 2.3, and creatinine 1.21.  A MRSA PCR was negative.  Blood and wound cultures are pending.  An MRI of the left foot on 10/15 showed wound around the left great toe with marrow signal alteration within the distal aspect of 1st MT as well as both phalanges c/w osteomyelitis and scattered foci in soft tissues that may be foci of gas without evidence of fluid collections or abscesses. An Xray of left foot showed soft tissue swelling of 1st digit with displaced fracture of medial base of 1st proximal phalanx.  He is currently on Cefepime and Vancomycin.  ID was asked by Dr. Seth on 10/15 to evaluate and manage his antibiotic therapy.  Patient has had some issues with psychosis over the past 6 months possibly related to drug and alcohol use.  This is also interfered with his acceptance of care.    10/16/2022 history reviewed.  Patient more pleasant this morning.  No high fevers or chills.  Tolerating vancomycin and cefepime, duration to be determined.  Orthopedics following.  Previous history of MRSA.    10/17/22: history reviewed.  Patient with minimal response. Tmax 99.6.  On Vancomycin and Cefepime for left hallux osteomyelitis and surrounding infection with duration to be determined.  Awaiting evaluate by Dr. Petty as per Dr. Lieberman's note.  More cooperative.    10/18/2022 history  reviewed.  Patient awaiting a left hallux ray amputation by orthopedic surgery.  Tolerating vancomycin and cefepime.    10/19/2022 history reviewed.  The patient underwent a first toe ray resection on his left foot by Dr. Yeison Petty on 10/18/2022.  Continues on vancomycin and cefepime until 11/25/2022.  No high fevers or chills.    10/20/2022 history reviewed.  Status post left foot first ray toe resection on 10/18, with previous history of MRSA, and new infection with Raoultella ornithinolytica.  Tolerating vancomycin and ceftriaxone until 11/25/2022.  Waiting on placement.    10/24/2022 history reviewed.  No high fevers.  Status post left foot first ray toe resection on 10/18, with previous history of MRSA, and new infection with Raoultella ornithinolytica.  Continues on ceftriaxone and vancomycin until 11/25/2022.    10/25/2022 history reviewed.  Status post left foot first toe resection 10/18/2022 with cultures previously positive for MRSA, and now for Klebsiella and Raoultella.  Mental status has been a limiting factor with his psychiatric history in terms of treatment.  He is tolerating vancomycin and ceftriaxone to continue until 11/25/2022.  Placement is in progress.    10/26/2022 history reviewed.  Status post left first toe resection 10/18/2022 for osteomyelitis.  Tolerating ceftriaxone and vancomycin treating MRSA, Klebsiella, and other bacteria until 11/25/2022.  Awaiting placement, especially given his mental status and psychiatric history.  No high fever.  Pain controlled.    10/27/2022 history reviewed.  Continues on vancomycin and ceftriaxone until 11/25/2022 for left first toe osteomyelitis.  Status post resection 10/18/2022.  Awaiting placement.  No high fever.    10/31/2022 history reviewed.  Continues on antibiotics for left first toe osteomyelitis until 11/25.  Status post resection 10/18.  Confused off and on.  Awaiting placement.  No fever.    11/1/2022 history reviewed.  Tolerating  ceftriaxone and vancomycin until 11/25/2022 for left first toe osteomyelitis status post resection 10/18.  Pleasant but confused.  Awaiting placement.  No fever.  No pain.    11/2/2022 history reviewed.  On vancomycin and ceftriaxone until 11/25 for left toe osteomyelitis status post resection 10/18/2022.  Placement has been difficult as places or refusing him.  No fever.    11/3/2022 history reviewed.  Tolerating ceftriaxone and vancomycin until 11/25/2022 for left first toe osteomyelitis polymicrobial status post resection 10/18/2022.  Still a difficult placement issue.  No fever.    11/4/2022 history reviewed.  Continues on vancomycin and ceftriaxone until 11/25 for left first toe osteomyelitis polymicrobial, status post amputation and resection 10/18/2022.  More amendable to care.  No fever.  Waiting on placement.    11/7/2022 history reviewed.  Tolerating ceftriaxone and vancomycin until 11/25 for left first toe osteomyelitis status post resection 10/18.  No high fever.    11/8/2022 history reviewed.  Continues on antibiotics till 11/25 for left first toe osteomyelitis status post resection 10/18.  No fever.    11/9/2022 history reviewed.  Continues on vancomycin and ceftriaxone until 11/25 for left first toe osteomyelitis.  Pleasant but confused.  No fever.    11/10/2022 history reviewed.  Tolerating antibiotics until 11/25 for left first toe osteomyelitis.  No high fevers.  Having difficulties being placed for his IV antibiotics and wound care.  No pain.    11/11/2022 history reviewed.  Continues on vancomycin and ceftriaxone until 11/25 for left first toe osteomyelitis.  No fever.  Watching TV and comfortable.  Waiting on placement.    11/12/22 hx rev.  Angela ab till 11/25 for left foot OM.  No pain or fever.    Past Medical History:   Diagnosis Date   • Diabetes mellitus (HCC)    • GERD (gastroesophageal reflux disease)    HTN  Psychotic d/o, unspecified.    Past Surgical History:   Procedure Laterality Date    • AMPUTATION FOOT Left 10/18/2022    Procedure: PARTIAL FIRST RAY AMPUTATION LEFT;  Surgeon: Yeison Petty MD;  Location: [facility];  Service: Orthopedics;  Laterality: Left;   • EYE SURGERY     Left foot debridement/I and D x 2 9/27/22 and 9/30/22.  Left first ray amputation 10/18/2022.    Pediatric History   Patient Parents   • Not on file     Other Topics Concern   • Not on file   Social History Narrative    Caffeine 0-1 servings per day    Patient lives at home .   Patient lives at [facility]  Former smoker, no alcohol, smokes marijuana.     family history includes Diabetes in his brother, brother, father, maternal grandfather, maternal grandmother, mother, and sister; Hypertension in his brother, brother, father, and mother.    No Known Allergies      There is no immunization history on file for this patient.    Medication:    Current Facility-Administered Medications:   •  acetaminophen (TYLENOL) tablet 650 mg, 650 mg, Oral, Q4H PRN, 650 mg at 11/08/22 1942 **OR** [DISCONTINUED] acetaminophen (TYLENOL) suppository 650 mg, 650 mg, Rectal, Q4H PRN, Yeison Petty MD  •  bisacodyl (DULCOLAX) suppository 10 mg, 10 mg, Rectal, Daily PRN, Yeison Petty MD  •  cefTRIAXone (ROCEPHIN) 2 g/100 mL 0.9% NS IVPB (MBP), 2 g, Intravenous, Q24H, Hero Ty, [facility], Last Rate: 200 mL/hr at 11/12/22 1103, 2 g at 11/12/22 1103  •  dextrose (D50W) (25 g/50 mL) IV injection 25 g, 25 g, Intravenous, Q15 Min PRN, Yeison Petty MD  •  dextrose (GLUTOSE) oral gel 15 g, 15 g, Oral, Q15 Min PRN, Yeison Petty MD  •  ferrous sulfate tablet 325 mg, 325 mg, Oral, Daily With Breakfast, Yeison Petty MD, 325 mg at 11/12/22 0828  •  glipizide (GLUCOTROL) tablet 2.5 mg, 2.5 mg, Oral, BID HANNA, Elena Ugalde PA-C, 2.5 mg at 11/12/22 1807  •  glucagon (human recombinant) (GLUCAGEN DIAGNOSTIC) injection 1 mg, 1 mg, Intramuscular, Q15 Min PRN, Yeison Petty MD  •  heparin (porcine) 5000 UNIT/ML injection 5,000 Units,  5,000 Units, Subcutaneous, Q8H, Yeison Petty MD, 5,000 Units at 22 1523  •  lisinopril (PRINIVIL,ZESTRIL) tablet 40 mg, 40 mg, Oral, Daily, Shahbaz Kuhn MD, 40 mg at 22 0828  •  magnesium sulfate 4 gram infusion - Mg less than or equal to 1mg/dL, 4 g, Intravenous, PRN **OR** magnesium sulfate 3 gram infusion (1gm x 3) - Mg 1.1 - 1.5 mg/dL, 1 g, Intravenous, PRN **OR** Magnesium Sulfate 2 gram infusion- Mg 1.6 - 1.9 mg/dL, 2 g, Intravenous, PRN, Yeison Petty MD  •  melatonin tablet 5 mg, 5 mg, Oral, Nightly PRN, Yeison Petty MD, 5 mg at 11/10/22 2133  •  metFORMIN (GLUCOPHAGE) tablet 1,000 mg, 1,000 mg, Oral, BID With Meals, Elena Ugalde PA-C, 1,000 mg at 22 1807  •  [] HYDROmorphone (DILAUDID) injection 0.5 mg, 0.5 mg, Intravenous, Q2H PRN, 0.5 mg at 10/24/22 1449 **AND** naloxone (NARCAN) injection 0.1 mg, 0.1 mg, Intravenous, Q5 Min PRN, Yeison Petty MD  •  NIFEdipine XL (PROCARDIA XL) 24 hr tablet 30 mg, 30 mg, Oral, Q24H, Elena Ugalde, ISAAC, 30 mg at 22 0828  •  OLANZapine (zyPREXA) tablet 5 mg, 5 mg, Oral, Daily, 5 mg at 22 0829 **AND** OLANZapine (zyPREXA) tablet 10 mg, 10 mg, Oral, Nightly, Nicole Henriquez DO, 10 mg at 22 2154  •  OLANZapine zydis (zyPREXA) disintegrating tablet 5 mg, 5 mg, Oral, Daily PRN, Elena Ugalde, PA-C  •  polyethylene glycol (MIRALAX) packet 17 g, 17 g, Oral, PRN, Yeison Petty MD, 17 g at 22 0802  •  sennosides-docusate (PERICOLACE) 8.6-50 MG per tablet 2 tablet, 2 tablet, Oral, BID, Elean Ugalde PA-C, 2 tablet at 22 0828  •  Insert peripheral IV, , , Once **AND** sodium chloride 0.9 % flush 10 mL, 10 mL, Intravenous, PRN, Yeison Petty MD, 10 mL at 22 0435  •  sodium chloride 0.9 % flush 10 mL, 10 mL, Intravenous, Q12H, Nicole Henriquez DO, 10 mL at 22 0846  •  vancomycin in dextrose 5% 150 mL (VANCOCIN) IVPB 750 mg, 750 mg, Intravenous, Q12H, Hero Ty, [facility],  "Last Rate: 150 mL/hr at 11/12/22 1523, 750 mg at 11/12/22 1523  •  ziprasidone (GEODON) injection 10 mg, 10 mg, Intramuscular, Q4H PRN, Wisam Delaney MD    Please refer to the medical record for a full medication list    Review of Systems:  Unable to get a reliable history as patient is responding with yes and no to basic questions and has a psychotic disorder.  Denies fever, chills, nausea, vomiting, diarrhea, shortness of breath, dysuria, or rashes.    Physical Exam:   Vital Signs   Temp:  [97.5 °F (36.4 °C)-98.8 °F (37.1 °C)] 98.8 °F (37.1 °C)  Heart Rate:  [74-88] 88  Resp:  [16-18] 17  BP: (145-164)/(71-80) 161/78    Temp  Min: 97.5 °F (36.4 °C)  Max: 98.8 °F (37.1 °C)  BP  Min: 145/71  Max: 164/72  Pulse  Min: 74  Max: 88  Resp  Min: 16  Max: 18  SpO2  Min: 94 %  Max: 96 %    Blood pressure 161/78, pulse 88, temperature 98.8 °F (37.1 °C), temperature source Oral, resp. rate 17, height 182.9 cm (72\"), weight 87.4 kg (192 lb 9.6 oz), SpO2 94 %.  GENERAL: Awake and alert, in minimal distress. Appears older than stated age.  Resting in bed.  Watching TV.  HEENT:  Normocephalic, atraumatic.  Oropharynx without thrush.   EYES: No conjunctival injection. No icterus. EOM full.  LYMPHATICS: No lymphadenopathy of the neck or axillary or inguinal regions.   HEART: No murmur, gallop, or pericardial friction rub. Reg rate rhythm.    LUNGS: Clear to auscultation and percussion. No respiratory distress, no use of accessory muscles.  No wheezes.  ABDOMEN: Soft, nontender, nondistended. No appreciable HSM.  Bowel sounds normal.  SKIN: Warm and dry without cutaneous eruptions.   Left foot dressing in place, right arm PICC okay placed 10/27.  Splint in place.  No drainage.  PSYCHIATRIC: Mental status with occasional confusion.  But overall pleasant.  Can follow commands at times.    EXT: Left foot surgical dressing in place.  No crepitus, some drainage from webspace. Normal range of motion.  NEURO: Oriented to name, nonfocal. "  Follows some commands and pleasant with me.    Results Review:   I reviewed the patient's new clinical results.  I reviewed the patient's new imaging results and agree with the interpretation.  I reviewed the patient's other test results and agree with the interpretation    Results from last 7 days   Lab Units 11/11/22  0627 11/08/22  0503   WBC 10*3/mm3 6.95 8.71   HEMOGLOBIN g/dL 8.8* 8.8*   HEMATOCRIT % 29.4* 32.7*   PLATELETS 10*3/mm3 352 410     Results from last 7 days   Lab Units 11/12/22  0429   SODIUM mmol/L 139   POTASSIUM mmol/L 4.0   CHLORIDE mmol/L 104   CO2 mmol/L 26.0   BUN mg/dL 24*   CREATININE mg/dL 0.85   GLUCOSE mg/dL 139*   CALCIUM mg/dL 9.3     Results from last 7 days   Lab Units 11/11/22  0627   ALK PHOS U/L 107   BILIRUBIN mg/dL <0.2   ALT (SGPT) U/L 15   AST (SGOT) U/L 17             Results from last 7 days   Lab Units 11/07/22  1127   VANCOMYCIN RM mcg/mL 24.60         Estimated Creatinine Clearance: 107.1 mL/min (by C-G formula based on SCr of 0.85 mg/dL).  CPK    Common Labsle 10/19/22   Creatine Kinase 119            Procalitonin Results:       Brief Urine Lab Results  (Last result in the past 365 days)      Color   Clarity   Blood   Leuk Est   Nitrite   Protein   CREAT   Urine HCG        06/12/22 2056 Yellow   Clear     Negative                    No results found for: SITE, ALLENTEST, PHART, OSA8DBE, PO2ART, CIY0HZV, BASEEXCESS, F2LIHURG, HGBBG, HCTABG, OXYHEMOGLOBI, METHHGBN, CARBOXYHGB, CO2CT, BAROMETRIC, MODALITY, FIO2     Microbiology:  Microbiology Results (last 10 days)     ** No results found for the last 240 hours. **        Blood and wound cultures 10/14 pending.       No results found for: TISSCXQ, CULTURES, CULTURE, BLOODCX, ANACX, BALCX, ACIDFASTCX, BODYFLDCX, CXREFLEX, FUNGUSCX, RESPCX, MRSACX, ROUTCX, BRCHWSHCLT, TISSUECX, URINECX, CMVCX, WOUNDCX, BCIDPCR, BFCULTURE, BLOODCULT(      Radiology:  Imaging Results (Last 72 Hours)     ** No results found for the last 72  hours. **          IMPRESSION:   1. Left hallux osteomyelitis after trauma/displaced fracture 9/17.  At risk for fracture not healing in the first toe given the infection, noncompliance with walking on foot, and ongoing infection.  Likely gangrene and poor wound healing related to vascular disease.  Resolving after surgery 10/18.  2. 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  Cx with MRSA, Enterococcus faecalis, and Corynebacterium striatum.  Initially treated with Daptomycin and discharge on oral Zyvox and oral Rifampin until 10/26/22.  Cx with Raoultella ornithinolytica (sens to Cefepime, ceftriaxone) and Klebsiella.  Status post left first ray resection 10/18/2022.  Improved.  3. Lactic acidosis, related to above. Resolved.   4. Elevated inflammatory markers, related to above.  CRP 3.07 on 10/14.  CRP 3.10 on 10/26.  5. Type 2 diabetes mellitus/peripheral neuropathy.  Affects wound healing.  6. Hyperglycemia, related to above.  7. Anemia of chronic disease.  Ongoing.  8. Essential hypertension.  Controlled.  9. Gastroesophageal reflux disease.  10. Thrombocytosis.  Related to above issues.  Resolved.  11. Psychosis since April 2022 reported by the patient's sister, thought to be related to drug and alcohol use.  Was hospitalized previously at [facility].  Awaiting placement.  12. Hyponatremia, resolved.  13. H/o MRSA.  Prob involved left foot as well.  14. Hypocalcemia resolved.  15. Elevated alkaline phosphatase resolved.    Moving forward on current regimen.  Discussed previously with nursing and family.  Main issue is placement.  Discussed with case management.    Plan:  1. Diagnostically, follow blood and wound cultures, imaging, physical examination, CBC, CMP, CRP, and vancomycin levels.   2. Therapeutically, continue ceftriaxone and Vancomycin for 6 weeks of IV antibiotics until 11/25/22.  This will cover the organisms including previous MRSA.   Vancomycin dose previously increased to 1 g IV every 12 hours.  3. Orthopedic surgery status post left first ray resection for osteomyelitis 10/18/2022.   4. Awaiting placement.  5. Continue supportive care.  PICC placement right arm 10/27/2022.  Placement major issue.    I discussed the patient's findings and my recommendations with patient and his family.  He will need to be in a skilled facility to receive his antibiotics.    Our group would be pleased to follow this patient over the course of their hospitalization and assist with outpatient antimicrobial therapy, as indicated.  Further recommendations depend on the results of the cultures and clinical course. Discussed with patient's sister and family.  Difficult issue with compliance in terms of wound care and staying off his foot.  See next on Monday, call sooner if needed.    Case management orders: Please arrange for skilled facility IV antibiotics with ceftriaxone 2 g IV daily, vancomycin 1 g IV every 12 hours to continue until 11/25/2022.  Pharmacy to adjust dose of vancomycin based on weekly trough levels to try to maintain level between 12 and 18.  Check CBC, CMP, CRP, vancomycin trough weekly while on IV antibiotics.  Fax orders to [phone], call [phone] with final arrangements.  The treatment is for left first toe osteomyelitis with MRSA, Klebsiella, and Raoultella ornithinolytica.  Arrange for follow-up with me in 2 weeks post discharge.  Weekly PICC dressing changes.    Rayray Gordon MD  11/12/2022

## 2022-11-12 NOTE — PLAN OF CARE
Goal Outcome Evaluation:   Patient has been resting comfortably with no acute signs of distress. VSS. AO x1. No complaints of pain overnight. Dressing C/D/I. Will continue to monitor as patient awaits placement.

## 2022-11-12 NOTE — THERAPY TREATMENT NOTE
Patient Name: Omkar Nava  : 1955    MRN: [id number]                              Today's Date: 2022       Admit Date: 10/14/2022    Visit Dx:     ICD-10-CM ICD-9-CM   1. Osteomyelitis of toe of left foot (HCC)  M86.9 730.27   2. Anemia, unspecified type  D64.9 285.9   3. Hyperglycemia  R73.9 790.29   4. 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, without long-term current use of insulin   • Orthostatic hypotension   • Acute osteomyelitis of left foot (HCC)   • Type 2 diabetes mellitus (HCC)   • Essential hypertension   • Psychotic disorder (HCC)   • Neurocognitive disorder     Past Medical History:   Diagnosis Date   • Diabetes mellitus (HCC)    • GERD (gastroesophageal reflux disease)      Past Surgical History:   Procedure Laterality Date   • AMPUTATION FOOT Left 10/18/2022    Procedure: PARTIAL FIRST RAY AMPUTATION LEFT;  Surgeon: Yeison Petty MD;  Location: [facility];  Service: Orthopedics;  Laterality: Left;   • EYE SURGERY        General Information     Row Name 22 1344          OT Time and Intention    Document Type therapy note (daily note)  -MR     Mode of Treatment occupational therapy  -MR     Row Name 22 1689          General Information    Patient Profile Reviewed yes  -MR     Existing Precautions/Restrictions --  WBAT with HEEL off loading shoe on, R foot with cast shoe, dementia, blindness  -MR     Barriers to Rehab visual deficit;cognitive status  -MR     Row Name 22 1656          Cognition    Orientation Status (Cognition) oriented to;person;situation  -MR     Row Name 22 2909          Safety Issues, Functional Mobility    Safety Issues Affecting Function (Mobility) awareness of need for assistance;insight into deficits/self-awareness;safety  precaution awareness;safety precautions follow-through/compliance;sequencing abilities;ability to follow commands;at risk behavior observed  -MR     Impairments Affecting Function (Mobility) balance;cognition;coordination;endurance/activity tolerance;motor control;postural/trunk control;strength;visual/perceptual  -MR     Cognitive Impairments, Mobility Safety/Performance awareness, need for assistance;insight into deficits/self-awareness;safety precaution awareness;safety precaution follow-through;problem-solving/reasoning;sequencing abilities  -MR           User Key  (r) = Recorded By, (t) = Taken By, (c) = Cosigned By    Initials Name Provider Type    MR Sonia Laureano, OT Occupational Therapist                 Mobility/ADL's     Row Name 11/12/22 4105          Bed Mobility    Bed Mobility supine-sit;sit-supine  -MR     Supine-Sit Potter (Bed Mobility) moderate assist (50% patient effort);verbal cues  -MR     Sit-Supine Potter (Bed Mobility) maximum assist (25% patient effort);2 person assist  -MR     Bed Mobility, Safety Issues cognitive deficits limit understanding;decreased use of arms for pushing/pulling;decreased use of legs for bridging/pushing  -MR     Assistive Device (Bed Mobility) head of bed elevated;bed rails  -MR     Comment, (Bed Mobility) v/c and t/c for sequencing, hand placement and safety.  -MR     Row Name 11/12/22 1155          Transfers    Transfers sit-stand transfer  -MR     Comment, (Transfers) Multiple STS transfers completed from EOB to upright w/ BUE support. Posterior lean/pushing noted once in standing. Pt able to come to upright w/ cueing momentarily. Pt unable to complete side stepping to advance to higher in bed.  -MR     Row Name 11/12/22 3134          Stand-Sit Transfer    Stand-Sit Potter (Transfers) moderate assist (50% patient effort);2 person assist;verbal cues;nonverbal cues (demo/gesture)  -MR     Assistive Device (Stand-Sit Transfers) other (see comments)   BUE support  -MR     Row Name 11/12/22 1345          Activities of Daily Living    BADL Assessment/Intervention upper body dressing;toileting;lower body dressing  -MR     Row Name 11/12/22 1345          Mobility    Extremity Weight-bearing Status left lower extremity  -MR     Left Lower Extremity (Weight-bearing Status) weight-bearing as tolerated (WBAT);other (see comments)  w/ heel off loading shoe on  -MR     Row Name 11/12/22 1345          Upper Body Dressing Assessment/Training    Garrard Level (Upper Body Dressing) don;doff;minimum assist (75% patient effort);other (see comments)  hospital gown  -MR     Position (Upper Body Dressing) supported sitting;edge of bed sitting  -MR     Row Name 11/12/22 1345          Lower Body Dressing Assessment/Training    Garrard Level (Lower Body Dressing) other (see comments);maximum assist (25% patient effort);shoes/slippers  adjusting shoes prior to standing.  -MR     Position (Lower Body Dressing) edge of bed sitting  -MR     Row Name 11/12/22 8836          Toileting Assessment/Training    Garrard Level (Toileting) perform perineal hygiene;dependent (less than 25% patient effort);change pad/brief;adjust/manage clothing;moderate assist (50% patient effort)  -MR     Position (Toileting) supported sitting  -MR           User Key  (r) = Recorded By, (t) = Taken By, (c) = Cosigned By    Initials Name Provider Type    Sonia Corona OT Occupational Therapist               Obj/Interventions     Row Name 11/12/22 1352          Balance    Balance Assessment sitting static balance;sitting dynamic balance;standing static balance;standing dynamic balance  -     Static Sitting Balance contact guard;verbal cues  -MR     Dynamic Sitting Balance minimal assist;verbal cues  -MR     Position, Sitting Balance supported;sitting edge of bed  -MR     Static Standing Balance minimal assist;verbal cues  -MR     Dynamic Standing Balance minimal assist;verbal cues  -MR      Position/Device Used, Standing Balance supported;other (see comments)  BUE support  -MR     Balance Interventions sitting;standing;sit to stand;supported;static;dynamic;minimal challenge;occupation based/functional task  -MR     Comment, Balance Pt demo posterior lean/pushing once upright requiring ModA x 2. Pt able to correct posture momentarily but requiring consistant cueing.  -MR           User Key  (r) = Recorded By, (t) = Taken By, (c) = Cosigned By    Initials Name Provider Type    MR GeorgiSonia, OT Occupational Therapist               Goals/Plan    No documentation.                Clinical Impression     Row Name 11/12/22 9446          Pain Assessment    Pretreatment Pain Rating 0/10 - no pain  -MR     Posttreatment Pain Rating 0/10 - no pain  -MR     Pre/Posttreatment Pain Comment Denied pain pre and post session.  -MR     Pain Intervention(s) Ambulation/increased activity;Repositioned  -MR     Row Name 11/12/22 3794          Plan of Care Review    Plan of Care Reviewed With patient  -MR     Progress no change  -MR     Outcome Evaluation Pt motivated to participate in OT session this date. Pt requiring ModA for supine to sit w/ v/c and t/c for sequencing to advance to EOB. Pt initially requiring Lakisha for sitting balance but improved to SBA. OT engaged pt in multiple STS from EOB to upright w/ BUE support w/ ModA x2, pt demo posterior lean/pushing. With cueing pt able to improve momentarily. DEP for toileting tasks/hygiene. Pt unable to weight shift to side step to advance higher in bed. MaxA x 2 for sit to supine. MaxA x 2 to advance higher in bed. Continue to progress as able.  -MR     Row Name 11/12/22 4629          Therapy Plan Review/Discharge Plan (OT)    Anticipated Discharge Disposition (OT) skilled nursing facility  -MR     Row Name 11/12/22 1355          Vital Signs    O2 Delivery Pre Treatment room air  -MR     O2 Delivery Intra Treatment room air  -MR     O2 Delivery Post Treatment room air   -MR     Pre Patient Position Supine  -MR     Intra Patient Position Standing  -MR     Post Patient Position Supine  -MR     Row Name 11/12/22 1352          Positioning and Restraints    Pre-Treatment Position in bed  -MR     Post Treatment Position bed  -MR     In Bed notified nsg;exit alarm on;encouraged to call for assist;call light within reach;side rails up x3;fowlers;with other staff  seizure pads in place  -MR           User Key  (r) = Recorded By, (t) = Taken By, (c) = Cosigned By    Initials Name Provider Type    Sonia Corona OT Occupational Therapist               Outcome Measures     Row Name 11/12/22 1780          How much help from another is currently needed...    Putting on and taking off regular lower body clothing? 1  -MR     Bathing (including washing, rinsing, and drying) 1  -MR     Toileting (which includes using toilet bed pan or urinal) 1  -MR     Putting on and taking off regular upper body clothing 3  -MR     Taking care of personal grooming (such as brushing teeth) 3  -MR     Eating meals 3  -MR     AM-PAC 6 Clicks Score (OT) 12  -MR     Row Name 11/12/22 9795          Functional Assessment    Outcome Measure Options AM-PAC 6 Clicks Daily Activity (OT)  -MR           User Key  (r) = Recorded By, (t) = Taken By, (c) = Cosigned By    Initials Name Provider Type    Sonia Corona OT Occupational Therapist                Occupational Therapy Education     Title: PT OT SLP Therapies (In Progress)     Topic: Occupational Therapy (In Progress)     Point: ADL training (In Progress)     Description:   Instruct learner(s) on proper safety adaptation and remediation techniques during self care or transfers.   Instruct in proper use of assistive devices.              Learning Progress Summary           Patient Acceptance, E, NR by MR at 11/12/2022 1358    Acceptance, E,D, NR by SANDRA at 11/7/2022 0946    Acceptance, E,D, NR by SANDRA at 11/2/2022 1505    Acceptance, E, VU by PEG at 10/26/2022 1998     Acceptance, E, VU by LS at 10/25/2022 2209    Acceptance, E,D, NR by SANDRA at 10/24/2022 1340    Acceptance, E, VU by LS at 10/22/2022 2204    Acceptance, E,D, NR by SANDRA at 10/19/2022 1445   Family Acceptance, E,D, NR by SANDRA at 11/7/2022 0946                   Point: Home exercise program (In Progress)     Description:   Instruct learner(s) on appropriate technique for monitoring, assisting and/or progressing therapeutic exercises/activities.              Learning Progress Summary           Patient Acceptance, E, NR by MR at 11/12/2022 1358    Acceptance, E,D, NR by SANDRA at 11/7/2022 0946    Acceptance, E, VU by PEG at 10/26/2022 2333    Acceptance, E, VU by LS at 10/25/2022 2209    Acceptance, E, VU by LS at 10/22/2022 2204   Family Acceptance, E,D, NR by SANDRA at 11/7/2022 0946                   Point: Precautions (In Progress)     Description:   Instruct learner(s) on prescribed precautions during self-care and functional transfers.              Learning Progress Summary           Patient Acceptance, E, NR by MR at 11/12/2022 1358    Acceptance, E,D, NR by SANDRA at 11/7/2022 0946    Acceptance, E,D, NR by SANDRA at 11/2/2022 1505    Acceptance, E, VU by LS at 10/26/2022 2333    Acceptance, E, VU by LS at 10/25/2022 2209    Acceptance, E,D, NR by SANDRA at 10/24/2022 1340    Acceptance, E, VU by LS at 10/22/2022 2204    Acceptance, E,D, NR by SANDRA at 10/19/2022 1445   Family Acceptance, E,D, NR by SANDRA at 11/7/2022 0946                   Point: Body mechanics (In Progress)     Description:   Instruct learner(s) on proper positioning and spine alignment during self-care, functional mobility activities and/or exercises.              Learning Progress Summary           Patient Acceptance, E, NR by MR at 11/12/2022 1358    Acceptance, E,D, NR by SANDRA at 11/7/2022 0946    Acceptance, E,D, NR by SANDRA at 11/2/2022 1505    Acceptance, E, VU by PEG at 10/26/2022 2333    Acceptance, E, VU by PEG at 10/25/2022 2209    Acceptance, E,D, NR by SANDRA at  10/24/2022 1340    Acceptance, E, VU by LS at 10/22/2022 2204    Acceptance, E,D, NR by SANDRA at 10/19/2022 1445   Family Acceptance, E,D, NR by SANDRA at 11/7/2022 0946                               User Key     Initials Effective Dates Name Provider Type Discipline     06/16/21 -  Jenniffer Lee OT Occupational Therapist OT     09/22/22 -  Sonia Laureano OT Occupational Therapist OT     09/22/22 -  Augusta Singleton, RN Registered Nurse Nurse              OT Recommendation and Plan     Plan of Care Review  Plan of Care Reviewed With: patient  Progress: no change  Outcome Evaluation: Pt motivated to participate in OT session this date. Pt requiring ModA for supine to sit w/ v/c and t/c for sequencing to advance to EOB. Pt initially requiring Lakisha for sitting balance but improved to SBA. OT engaged pt in multiple STS from EOB to upright w/ BUE support w/ ModA x2, pt demo posterior lean/pushing. With cueing pt able to improve momentarily. DEP for toileting tasks/hygiene. Pt unable to weight shift to side step to advance higher in bed. MaxA x 2 for sit to supine. MaxA x 2 to advance higher in bed. Continue to progress as able.     Time Calculation:    Time Calculation- OT     Row Name 11/12/22 1358             Time Calculation- OT    OT Start Time 1118  -MR      OT Received On 11/12/22  -MR      OT Goal Re-Cert Due Date 11/17/22  -MR         Timed Charges    72323 - OT Therapeutic Activity Minutes 20  -MR      22630 - OT Self Care/Mgmt Minutes 15  -MR         Total Minutes    Timed Charges Total Minutes 35  -MR       Total Minutes 35  -MR            User Key  (r) = Recorded By, (t) = Taken By, (c) = Cosigned By    Initials Name Provider Type    MR Sonia Laureano OT Occupational Therapist              Therapy Charges for Today     Code Description Service Date Service Provider Modifiers Qty    53489520264 HC OT THERAPEUTIC ACT EA 15 MIN 11/12/2022 Sonia Laureano OT GO 1    50270683462 HC OT SELF CARE/MGMT/TRAIN EA 15  MIN 11/12/2022 Sonia Laureano, OT GO 1    10708069243 HC OT THER SUPP EA 15 MIN 11/12/2022 Sonia Laureano OT GO 2               Sonia Laureano OT  11/12/2022

## 2022-11-12 NOTE — PROGRESS NOTES
"    [facility] Medicine Services  PROGRESS NOTE    Patient Name: Omkar Nava  : 1955  MRN: [id number]    Date of Admission: 10/14/2022  Primary Care Physician: Provider, No Known    Subjective     CC:    Presented due to concerns about \"foot infection\"    HPI:   Up in bed. Alert and oriented currently, but has had delirium. Fell yesterday with no obvious rashes.    Review of Systems   Constitutional: Positive for activity change and fatigue.   HENT: Negative.    Respiratory: Negative.    Cardiovascular: Negative.    Gastrointestinal: Negative.    Psychiatric/Behavioral: Positive for confusion.       Objective     Vital Signs:   Temp:  [97.5 °F (36.4 °C)-98.7 °F (37.1 °C)] 97.5 °F (36.4 °C)  Heart Rate:  [74-82] 76  Resp:  [16-18] 17  BP: (145-164)/(71-80) 145/71     Physical Exam:  NAD, alert  OP clear, dry MM  RRR/NSR  No respiratory distress, non-labored  TREJO  Normal affect  RUE PICC    Results Reviewed:  LAB RESULTS:      Lab 22  0627 22  0503 22  1111   WBC 6.95 8.71 5.79   HEMOGLOBIN 8.8* 8.8* 7.9*   HEMATOCRIT 29.4* 32.7* 26.4*   PLATELETS 352 410 388   NEUTROS ABS  --  6.49  --    IMMATURE GRANS (ABS)  --  0.04  --    LYMPHS ABS  --  1.35  --    MONOS ABS  --  0.57  --    EOS ABS  --  0.20  --    MCV 93.6 103.8* 91.7         Lab 22  0429 22  0627 11/10/22  0627 22  0514 22  1012   SODIUM 139 137 139 139 138   POTASSIUM 4.0 4.3 4.4 3.8 4.3   CHLORIDE 104 104 105 106 103   CO2 26.0 22.0 27.0 24.0 27.0   ANION GAP 9.0 11.0 7.0 9.0 8.0   BUN 24* 25* 22 21 23   CREATININE 0.85 1.01 0.92 0.92 0.97   EGFR 96.4 82.5 92.3 92.3 86.6   GLUCOSE 139* 138* 95 90 143*   CALCIUM 9.3 9.5 9.1 9.0 9.2         Lab 22  0627 22  1012   TOTAL PROTEIN 7.3 7.1   ALBUMIN 3.10* 3.40*   GLOBULIN 4.2 3.7   ALT (SGPT) 15 20   AST (SGOT) 17 14   BILIRUBIN <0.2 <0.2   ALK PHOS 107 111     Brief Urine Lab Results  (Last result in the past 365 days)      " Color   Clarity   Blood   Leuk Est   Nitrite   Protein   CREAT   Urine HCG        06/12/22 2056 Yellow   Clear     Negative                   Microbiology Results Abnormal     Procedure Component Value - Date/Time    Fungus Culture - Tissue, Toe, Left [[id number]] Collected: 10/18/22 1608    Lab Status: Preliminary result Specimen: Tissue from Toe, Left Updated: 11/08/22 1715     Fungus Culture No fungus isolated at 3 weeks    AFB Culture - Tissue, Toe, Left [[id number]] Collected: 10/18/22 1608    Lab Status: Preliminary result Specimen: Tissue from Toe, Left Updated: 11/08/22 1715     AFB Culture No AFB isolated at 3 weeks     AFB Stain No acid fast bacilli seen on concentrated smear    Fungus Culture - Tissue, Toe, Left [[id number]] Collected: 10/18/22 1552    Lab Status: Preliminary result Specimen: Tissue from Toe, Left Updated: 11/08/22 1701     Fungus Culture No fungus isolated at 3 weeks    AFB Culture - Tissue, Toe, Left [[id number]] Collected: 10/18/22 1552    Lab Status: Preliminary result Specimen: Tissue from Toe, Left Updated: 11/08/22 1701     AFB Culture No AFB isolated at 3 weeks     AFB Stain No acid fast bacilli seen on concentrated smear    COVID PRE-OP / PRE-PROCEDURE SCREENING ORDER (NO ISOLATION) - Swab, Nasopharynx [[id number]]  (Normal) Collected: 10/27/22 0510    Lab Status: Final result Specimen: Swab from Nasopharynx Updated: 10/27/22 0530    Narrative:      The following orders were created for panel order COVID PRE-OP / PRE-PROCEDURE SCREENING ORDER (NO ISOLATION) - Swab, Nasopharynx.  Procedure                               Abnormality         Status                     ---------                               -----------         ------                     COVID-19, ABBOTT IN-HOUS...[[id number]]  Normal              Final result                 Please view results for these tests on the individual orders.    COVID-19, ABBOTT IN-HOUSE,NASAL Swab (NO TRANSPORT MEDIA) 2 HR TAT - Swab,  Nasopharynx [[id number]]  (Normal) Collected: 10/27/22 0510    Lab Status: Final result Specimen: Swab from Nasopharynx Updated: 10/27/22 0530     COVID19 Presumptive Negative    Narrative:      Fact sheet for providers: https://www.fda.gov/media/920464/download     Fact sheet for patients: https://www.fda.gov/media/588807/download    Test performed by PCR.  If inconsistent with clinical signs and symptoms patient should be tested with different authorized molecular test.    Anaerobic Culture - Tissue, Toe, Left [[id number]]  (Normal) Collected: 10/18/22 1608    Lab Status: Final result Specimen: Tissue from Toe, Left Updated: 10/23/22 0543     Anaerobic Culture No anaerobes isolated at 5 days    Anaerobic Culture - Tissue, Toe, Left [[id number]]  (Normal) Collected: 10/18/22 1552    Lab Status: Final result Specimen: Tissue from Toe, Left Updated: 10/23/22 0543     Anaerobic Culture No anaerobes isolated at 5 days    Tissue / Bone Culture - Tissue, Toe, Left [[id number]] Collected: 10/18/22 1552    Lab Status: Final result Specimen: Tissue from Toe, Left Updated: 10/21/22 0840     Tissue Culture No growth at 3 days     Gram Stain Moderate (3+) WBCs seen      No organisms seen    Blood Culture - Blood, Hand, Left [[id number]]  (Normal) Collected: 10/14/22 2115    Lab Status: Final result Specimen: Blood from Hand, Left Updated: 10/19/22 2215     Blood Culture No growth at 5 days    Blood Culture - Blood, Arm, Left [[id number]]  (Normal) Collected: 10/14/22 2115    Lab Status: Final result Specimen: Blood from Arm, Left Updated: 10/19/22 2215     Blood Culture No growth at 5 days    MRSA Screen, PCR (Inpatient) - Swab, Nares [[id number]]  (Normal) Collected: 10/14/22 2345    Lab Status: Final result Specimen: Swab from Nares Updated: 10/15/22 0741     MRSA PCR Negative    Narrative:      The negative predictive value of this diagnostic test is high and should only be used to consider de-escalating anti-MRSA therapy. A  positive result may indicate colonization with MRSA and must be correlated clinically.  MRSA Negative        No radiology results from the last 24 hrs    I have reviewed the medications:  Scheduled Meds:cefTRIAXone, 2 g, Intravenous, Q24H  ferrous sulfate, 325 mg, Oral, Daily With Breakfast  glipizide, 2.5 mg, Oral, BID AC  heparin (porcine), 5,000 Units, Subcutaneous, Q8H  lisinopril, 40 mg, Oral, Daily  metFORMIN, 1,000 mg, Oral, BID With Meals  NIFEdipine XL, 30 mg, Oral, Q24H  OLANZapine, 5 mg, Oral, Daily   And  OLANZapine, 10 mg, Oral, Nightly  senna-docusate sodium, 2 tablet, Oral, BID  sodium chloride, 10 mL, Intravenous, Q12H  vancomycin (dosing per levels), , Does not apply, Daily  vancomycin, 750 mg, Intravenous, Q12H      Continuous Infusions:   PRN Meds:.•  acetaminophen **OR** [DISCONTINUED] acetaminophen  •  bisacodyl  •  dextrose  •  dextrose  •  glucagon (human recombinant)  •  magnesium sulfate **OR** magnesium sulfate in D5W 1g/100mL (PREMIX) **OR** magnesium sulfate  •  melatonin  •  [] HYDROmorphone **AND** naloxone  •  OLANZapine zydis  •  polyethylene glycol  •  Insert peripheral IV **AND** sodium chloride  •  ziprasidone    Assessment & Plan     Active Hospital Problems    Diagnosis  POA   • **Acute osteomyelitis of left foot (HCC) [M86.172]  Yes   • Neurocognitive disorder [R41.9]  Unknown   • Type 2 diabetes mellitus (HCC) [E11.9]  Yes   • Essential hypertension [I10]  Yes   • Psychotic disorder (HCC) [F29]  Yes      Resolved Hospital Problems   No resolved problems to display.     Brief Hospital Course to date:  Omkar Nava is a 67 y.o. male with PMH significant for HTN, DMII and unspecified psychotic disorder. He previously worked as a  but has been unable to work for past 2-3 years and has become homeless.     His health declined in 2022 with an acute psychotic event with subsequent psychiatric hospitalization at [facility] from   to August 2022. He was subsequently transferred to a nursing facility in [place]. On 9/17/22, daughter visited him at the nursing home and he had a significant injury to his L great toe, causing an open articular fracture. He was admitted to [facility] and treated at that time with bedside flushing and repair by podiatry. He was given Ancef and sent back to nursing facility on Keflex. Wound cultures at that time grew MRSA, however blood culture showed no growth. On 9/27/2022, he presented to [facility] in [place] due to worsening foot wound and drainage. He underwent L foot I&D and debridement on 9/30/22. A PICC was placed on 10/3/22. He was to receive Daptomycin but due to expense, this was changed to Rifampin and Linezolid and PICC was removed. He was discharged to SNF in [facility] on 10/6/22 and at some point, was transferred to [place], in order to be closer to family. Daughter visited him on 10/14/22 and became concerned about the appearance of his toe. She brought him to [facility] ED for further evaluation. ID and orthopedic surgery follow.       Osteomyelitis of left foot / L first toe (s/p amputation)  Wound culture (+) Raoultella ornithinolytica   - 10/15/22 MRI of L foot showed wound around the great toe with geographic marrow signal alteration within the distal aspect of the first metatarsal as well as both phalanges of the great toe indicative of osteomyelitis  - He is s/p L great toe amputation on 10/18/22 by Dr. Petty  - Re-evaluated by Dr. Petty on 11/7. Splint removed. Sutures remain in place. Heel-weightbearing on LLE in hard-soled shoe. Follow up with Dr. Petty in 1 week (~ 11/14/22)  - NWB to LLE. Elevated LLE. Keep splint clean and dry. SQ heparin for DVT PPX while inpatient. Discharge on ASA 325mg daily x 30 days  - Right Arm PICC line with IV abx - Infectious Diseases - Dr. Gordon  - infectious diseases following    Iron-deficiency anemia    - Completed 3 days IV iron on 10/20/22  - hemoglobin is trending up  - on oral ferrous sulfate daily currently    Non-insulin dependent DMII  - Hgb A1c 7.0%   - Continue Metformin 1000mg BID, add Glipizide 2.5mg BID   - Continue TID accuchecks   - glucose appears well controlled.  Continue current regimen and monitor.    Hypertension  - discontinued Norvasc  - on lisinopril  - on Procardia XL.  BP stable.    Neurocognitive disorder  Dementia   - Re-started olanzapine 10/15/22, was recently hospitalized at [facility] for psychiatric issues; apparently PRN Haldol was not much help, have discontinued  - Continue Olanzapine 5mg QAM and 10mg QHS, may need to titrate  - Overall stable    Expected Discharge Location and Transportation: rehab with goal to transition to LTC. Private vehicle vs WC van   Expected Discharge Date:   11/14    DVT prophylaxis:Medical and mechanical DVT prophylaxis orders are present.     AM-PAC 6 Clicks Score (PT): 18 (11/11/22 0800)    CODE STATUS:   Code Status and Medical Interventions:   Ordered at: 10/14/22 7780     Level Of Support Discussed With:    Patient     Code Status (Patient has no pulse and is not breathing):    CPR (Attempt to Resuscitate)     Medical Interventions (Patient has pulse or is breathing):    Full Support     Wisam Delaney MD  11/12/22

## 2022-11-12 NOTE — PLAN OF CARE
Goal Outcome Evaluation:  Plan of Care Reviewed With: patient        Progress: no change  Outcome Evaluation: Pt motivated to participate in OT session this date. Pt requiring ModA for supine to sit w/ v/c and t/c for sequencing to advance to EOB. Pt initially requiring Lakisha for sitting balance but improved to SBA. OT engaged pt in multiple STS from EOB to upright w/ BUE support w/ ModA x2, pt demo posterior lean/pushing. With cueing pt able to improve momentarily. DEP for toileting tasks/hygiene. Pt unable to weight shift to side step to advance higher in bed. MaxA x 2 for sit to supine. MaxA x 2 to advance higher in bed. Continue to progress as able.

## 2022-11-13 LAB
ANION GAP SERPL CALCULATED.3IONS-SCNC: 8 MMOL/L (ref 5–15)
BUN SERPL-MCNC: 22 MG/DL (ref 8–23)
BUN/CREAT SERPL: 19.8 (ref 7–25)
CALCIUM SPEC-SCNC: 9.4 MG/DL (ref 8.6–10.5)
CHLORIDE SERPL-SCNC: 105 MMOL/L (ref 98–107)
CO2 SERPL-SCNC: 26 MMOL/L (ref 22–29)
CREAT SERPL-MCNC: 1.11 MG/DL (ref 0.76–1.27)
EGFRCR SERPLBLD CKD-EPI 2021: 73.7 ML/MIN/1.73
GLUCOSE BLDC GLUCOMTR-MCNC: 123 MG/DL (ref 70–130)
GLUCOSE BLDC GLUCOMTR-MCNC: 157 MG/DL (ref 70–130)
GLUCOSE BLDC GLUCOMTR-MCNC: 81 MG/DL (ref 70–130)
GLUCOSE SERPL-MCNC: 76 MG/DL (ref 65–99)
POTASSIUM SERPL-SCNC: 4.1 MMOL/L (ref 3.5–5.2)
SODIUM SERPL-SCNC: 139 MMOL/L (ref 136–145)

## 2022-11-13 PROCEDURE — 82962 GLUCOSE BLOOD TEST: CPT

## 2022-11-13 PROCEDURE — 25010000002 HEPARIN (PORCINE) PER 1000 UNITS: Performed by: ORTHOPAEDIC SURGERY

## 2022-11-13 PROCEDURE — 80048 BASIC METABOLIC PNL TOTAL CA: CPT

## 2022-11-13 PROCEDURE — 25010000002 CEFTRIAXONE PER 250 MG

## 2022-11-13 PROCEDURE — 99232 SBSQ HOSP IP/OBS MODERATE 35: CPT | Performed by: NURSE PRACTITIONER

## 2022-11-13 PROCEDURE — 25010000002 VANCOMYCIN PER 500 MG

## 2022-11-13 RX ADMIN — HEPARIN SODIUM 5000 UNITS: 5000 INJECTION INTRAVENOUS; SUBCUTANEOUS at 14:27

## 2022-11-13 RX ADMIN — METFORMIN HYDROCHLORIDE 1000 MG: 1000 TABLET, FILM COATED ORAL at 08:41

## 2022-11-13 RX ADMIN — Medication 10 ML: at 08:44

## 2022-11-13 RX ADMIN — SENNOSIDES AND DOCUSATE SODIUM 2 TABLET: 50; 8.6 TABLET ORAL at 08:43

## 2022-11-13 RX ADMIN — SENNOSIDES AND DOCUSATE SODIUM 2 TABLET: 50; 8.6 TABLET ORAL at 20:40

## 2022-11-13 RX ADMIN — LISINOPRIL 40 MG: 40 TABLET ORAL at 08:41

## 2022-11-13 RX ADMIN — SODIUM CHLORIDE 2 G: 900 INJECTION INTRAVENOUS at 11:29

## 2022-11-13 RX ADMIN — METFORMIN HYDROCHLORIDE 1000 MG: 1000 TABLET, FILM COATED ORAL at 17:11

## 2022-11-13 RX ADMIN — OLANZAPINE 5 MG: 5 TABLET, FILM COATED ORAL at 08:41

## 2022-11-13 RX ADMIN — GLIPIZIDE 2.5 MG: 5 TABLET ORAL at 06:16

## 2022-11-13 RX ADMIN — VANCOMYCIN HYDROCHLORIDE 750 MG: 750 INJECTION, SOLUTION INTRAVENOUS at 03:24

## 2022-11-13 RX ADMIN — Medication 10 ML: at 20:41

## 2022-11-13 RX ADMIN — Medication 5 MG: at 20:40

## 2022-11-13 RX ADMIN — NIFEDIPINE 30 MG: 30 TABLET, FILM COATED, EXTENDED RELEASE ORAL at 08:43

## 2022-11-13 RX ADMIN — GLIPIZIDE 2.5 MG: 5 TABLET ORAL at 17:11

## 2022-11-13 RX ADMIN — HEPARIN SODIUM 5000 UNITS: 5000 INJECTION INTRAVENOUS; SUBCUTANEOUS at 06:15

## 2022-11-13 RX ADMIN — OLANZAPINE 10 MG: 5 TABLET, FILM COATED ORAL at 20:40

## 2022-11-13 RX ADMIN — VANCOMYCIN HYDROCHLORIDE 750 MG: 750 INJECTION, SOLUTION INTRAVENOUS at 14:27

## 2022-11-13 RX ADMIN — FERROUS SULFATE TAB 325 MG (65 MG ELEMENTAL FE) 325 MG: 325 (65 FE) TAB at 08:43

## 2022-11-13 NOTE — PROGRESS NOTES
[facility] Medicine Services  PROGRESS NOTE    Patient Name: Omkar Nava  : 1955  MRN: [id number]    Date of Admission: 10/14/2022  Primary Care Physician: Provider, No Known    Subjective   Subjective     CC:  Presented due to concerns about foot infection     HPI:  Patient is resting in bed in NAD. He denies any pain.  He is eating breakfast and states he has a good appetite.     ROS:  Gen- No fevers, chills  CV- No chest pain, palpitations  Resp- No cough, dyspnea  GI- No N/V/D, abd pain         Objective   Objective     Vital Signs:   Temp:  [98.2 °F (36.8 °C)-98.9 °F (37.2 °C)] 98.4 °F (36.9 °C)  Heart Rate:  [74-88] 76  Resp:  [17-19] 18  BP: (138-161)/(66-80) 157/80     Physical Exam:  Constitutional: No acute distress, awake, alert  HENT: NCAT, mucous membranes moist  Respiratory: Clear to auscultation bilaterally, respiratory effort normal room air   Cardiovascular: RRR, no murmurs, rubs, or gallops  Gastrointestinal: Positive bowel sounds, soft, nontender, nondistended  Musculoskeletal: left foot dressing   Psychiatric: Appropriate affect, cooperative  Neurologic: Oriented x 3, strength symmetric in all extremities, Cranial Nerves grossly intact to confrontation, speech clear  Skin: No rashes, rac PICC line       Results Reviewed:  LAB RESULTS:      Lab 22  0622  0503   WBC 6.95 8.71   HEMOGLOBIN 8.8* 8.8*   HEMATOCRIT 29.4* 32.7*   PLATELETS 352 410   NEUTROS ABS  --  6.49   IMMATURE GRANS (ABS)  --  0.04   LYMPHS ABS  --  1.35   MONOS ABS  --  0.57   EOS ABS  --  0.20   MCV 93.6 103.8*         Lab 22  0749 22  0429 22  0627 11/10/22  0627 22  0514   SODIUM 139 139 137 139 139   POTASSIUM 4.1 4.0 4.3 4.4 3.8   CHLORIDE 105 104 104 105 106   CO2 26.0 26.0 22.0 27.0 24.0   ANION GAP 8.0 9.0 11.0 7.0 9.0   BUN 22 24* 25* 22 21   CREATININE 1.11 0.85 1.01 0.92 0.92   EGFR 73.7 96.4 82.5 92.3 92.3   GLUCOSE 76 139* 138* 95 90    CALCIUM 9.4 9.3 9.5 9.1 9.0         Lab 11/11/22  0627 11/08/22  1012   TOTAL PROTEIN 7.3 7.1   ALBUMIN 3.10* 3.40*   GLOBULIN 4.2 3.7   ALT (SGPT) 15 20   AST (SGOT) 17 14   BILIRUBIN <0.2 <0.2   ALK PHOS 107 111                     Brief Urine Lab Results  (Last result in the past 365 days)      Color   Clarity   Blood   Leuk Est   Nitrite   Protein   CREAT   Urine HCG        06/12/22 2056 Yellow   Clear     Negative                     Microbiology Results Abnormal     Procedure Component Value - Date/Time    Fungus Culture - Tissue, Toe, Left [[id number]] Collected: 10/18/22 1608    Lab Status: Preliminary result Specimen: Tissue from Toe, Left Updated: 11/08/22 1715     Fungus Culture No fungus isolated at 3 weeks    AFB Culture - Tissue, Toe, Left [[id number]] Collected: 10/18/22 1608    Lab Status: Preliminary result Specimen: Tissue from Toe, Left Updated: 11/08/22 1715     AFB Culture No AFB isolated at 3 weeks     AFB Stain No acid fast bacilli seen on concentrated smear    Fungus Culture - Tissue, Toe, Left [[id number]] Collected: 10/18/22 1552    Lab Status: Preliminary result Specimen: Tissue from Toe, Left Updated: 11/08/22 1701     Fungus Culture No fungus isolated at 3 weeks    AFB Culture - Tissue, Toe, Left [[id number]] Collected: 10/18/22 1552    Lab Status: Preliminary result Specimen: Tissue from Toe, Left Updated: 11/08/22 1701     AFB Culture No AFB isolated at 3 weeks     AFB Stain No acid fast bacilli seen on concentrated smear    COVID PRE-OP / PRE-PROCEDURE SCREENING ORDER (NO ISOLATION) - Swab, Nasopharynx [[id number]]  (Normal) Collected: 10/27/22 0510    Lab Status: Final result Specimen: Swab from Nasopharynx Updated: 10/27/22 0530    Narrative:      The following orders were created for panel order COVID PRE-OP / PRE-PROCEDURE SCREENING ORDER (NO ISOLATION) - Swab, Nasopharynx.  Procedure                               Abnormality         Status                     ---------                                -----------         ------                     COVID-19, ABBOTT IN-HOUS...[[id number]]  Normal              Final result                 Please view results for these tests on the individual orders.    COVID-19, ABBOTT IN-HOUSE,NASAL Swab (NO TRANSPORT MEDIA) 2 HR TAT - Swab, Nasopharynx [[id number]]  (Normal) Collected: 10/27/22 0510    Lab Status: Final result Specimen: Swab from Nasopharynx Updated: 10/27/22 0530     COVID19 Presumptive Negative    Narrative:      Fact sheet for providers: https://www.fda.gov/media/387202/download     Fact sheet for patients: https://www.fda.gov/media/999735/download    Test performed by PCR.  If inconsistent with clinical signs and symptoms patient should be tested with different authorized molecular test.    Anaerobic Culture - Tissue, Toe, Left [[id number]]  (Normal) Collected: 10/18/22 1608    Lab Status: Final result Specimen: Tissue from Toe, Left Updated: 10/23/22 0543     Anaerobic Culture No anaerobes isolated at 5 days    Anaerobic Culture - Tissue, Toe, Left [[id number]]  (Normal) Collected: 10/18/22 1552    Lab Status: Final result Specimen: Tissue from Toe, Left Updated: 10/23/22 0543     Anaerobic Culture No anaerobes isolated at 5 days    Tissue / Bone Culture - Tissue, Toe, Left [[id number]] Collected: 10/18/22 1552    Lab Status: Final result Specimen: Tissue from Toe, Left Updated: 10/21/22 0840     Tissue Culture No growth at 3 days     Gram Stain Moderate (3+) WBCs seen      No organisms seen    Blood Culture - Blood, Hand, Left [[id number]]  (Normal) Collected: 10/14/22 2115    Lab Status: Final result Specimen: Blood from Hand, Left Updated: 10/19/22 2215     Blood Culture No growth at 5 days    Blood Culture - Blood, Arm, Left [[id number]]  (Normal) Collected: 10/14/22 2115    Lab Status: Final result Specimen: Blood from Arm, Left Updated: 10/19/22 2215     Blood Culture No growth at 5 days    MRSA Screen, PCR (Inpatient) - Swab, Nares [[id number]]   (Normal) Collected: 10/14/22 2099    Lab Status: Final result Specimen: Swab from Nares Updated: 10/15/22 0741     MRSA PCR Negative    Narrative:      The negative predictive value of this diagnostic test is high and should only be used to consider de-escalating anti-MRSA therapy. A positive result may indicate colonization with MRSA and must be correlated clinically.  MRSA Negative          No radiology results from the last 24 hrs        I have reviewed the medications:  Scheduled Meds:cefTRIAXone, 2 g, Intravenous, Q24H  ferrous sulfate, 325 mg, Oral, Daily With Breakfast  glipizide, 2.5 mg, Oral, BID AC  heparin (porcine), 5,000 Units, Subcutaneous, Q8H  lisinopril, 40 mg, Oral, Daily  metFORMIN, 1,000 mg, Oral, BID With Meals  NIFEdipine XL, 30 mg, Oral, Q24H  OLANZapine, 5 mg, Oral, Daily   And  OLANZapine, 10 mg, Oral, Nightly  senna-docusate sodium, 2 tablet, Oral, BID  sodium chloride, 10 mL, Intravenous, Q12H  vancomycin, 750 mg, Intravenous, Q12H      Continuous Infusions:   PRN Meds:.•  acetaminophen **OR** [DISCONTINUED] acetaminophen  •  bisacodyl  •  dextrose  •  dextrose  •  glucagon (human recombinant)  •  magnesium sulfate **OR** magnesium sulfate in D5W 1g/100mL (PREMIX) **OR** magnesium sulfate  •  melatonin  •  [] HYDROmorphone **AND** naloxone  •  OLANZapine zydis  •  polyethylene glycol  •  Insert peripheral IV **AND** sodium chloride  •  ziprasidone    Assessment & Plan   Assessment & Plan     Active Hospital Problems    Diagnosis  POA   • **Acute osteomyelitis of left foot (HCC) [M86.172]  Yes   • Neurocognitive disorder [R41.9]  Unknown   • Type 2 diabetes mellitus (HCC) [E11.9]  Yes   • Essential hypertension [I10]  Yes   • Psychotic disorder (HCC) [F29]  Yes      Resolved Hospital Problems   No resolved problems to display.        Brief Hospital Course to date:  Omkar Nava is a 67 y.o. male with PMH significant for HTN, DMII and unspecified psychotic disorder. He previously  worked as a  but has been unable to work for past 2-3 years and has become homeless.      His health declined in June 2022 with an acute psychotic event with subsequent psychiatric hospitalization at [facility] in [facility] from June to August 2022. He was subsequently transferred to a nursing facility in [place]. On 9/17/22, daughter visited him at the nursing home and he had a significant injury to his L great toe, causing an open articular fracture. He was admitted to [facility] and treated at that time with bedside flushing and repair by podiatry. He was given Ancef and sent back to nursing facility on Keflex. Wound cultures at that time grew MRSA, however blood culture showed no growth. On 9/27/2022, he presented to [facility] in [place] due to worsening foot wound and drainage. He underwent L foot I&D and debridement on 9/30/22. A PICC was placed on 10/3/22. He was to receive Daptomycin but due to expense, this was changed to Rifampin and Linezolid and PICC was removed. He was discharged to SNF in [facility] on 10/6/22 and at some point, was transferred to [place], in order to be closer to family. Daughter visited him on 10/14/22 and became concerned about the appearance of his toe. She brought him to [facility] ED for further evaluation. ID and orthopedic surgery follow.     Plan was partially entered by my partner and I have reviewed and updated as appropriate on 11/13/22        Osteomyelitis of left foot / L first toe (s/p amputation)  Wound culture (+) Raoultella ornithinolytica   - 10/15/22 MRI of L foot showed wound around the great toe with geographic marrow signal alteration within the distal aspect of the first metatarsal as well as both phalanges of the great toe indicative of osteomyelitis  - He is s/p L great toe amputation on 10/18/22 by Dr. Petty  - Re-evaluated by Dr. Petty on 11/7. Splint removed. Sutures remain in  place. Heel-weightbearing on LLE in hard-soled shoe. Follow up with Dr. Petty in 1 week (~ 11/14/22)  - NWB to LLE. Elevated LLE. Keep splint clean and dry. SQ heparin for DVT PPX while inpatient. Discharge on ASA 325mg daily x 30 days  - Right Arm PICC line with IV abx - Infectious Diseases - Dr. Gordon  - infectious diseases following     Iron-deficiency anemia   - Completed 3 days IV iron on 10/20/22  - hemoglobin is trending up  - on oral ferrous sulfate daily currently     Non-insulin dependent DMII  - Hgb A1c 7.0%   - Continue Metformin 1000mg BID, add Glipizide 2.5mg BID   - Continue TID accuchecks   - glucose appears well controlled.  Continue current regimen and monitor.     Hypertension  - discontinued Norvasc  - on lisinopril  - on Procardia XL.  BP stable.     Neurocognitive disorder  Dementia   - Re-started olanzapine 10/15/22, was recently hospitalized at [facility] for psychiatric issues; apparently PRN Haldol was not much help, have discontinued  - Continue Olanzapine 5mg QAM and 10mg QHS, may need to titrate  - Overall stable    Plan: Dr. Petty will need to be contacted to see patient in hospital for wound check and suture removal prior to dc to rehab     Expected Discharge Location and Transportation: rehab with goal to transition to LTC. Private vehicle vs WC van   Expected Discharge Date: 11/14-11/15    DVT prophylaxis:  Medical and mechanical DVT prophylaxis orders are present.     AM-PAC 6 Clicks Score (PT): 18 (11/13/22 2164)    CODE STATUS:   Code Status and Medical Interventions:   Ordered at: 10/14/22 9113     Level Of Support Discussed With:    Patient     Code Status (Patient has no pulse and is not breathing):    CPR (Attempt to Resuscitate)     Medical Interventions (Patient has pulse or is breathing):    Full Support       Chely Lion, APRN  11/13/22               yes family @ bedside/yes

## 2022-11-13 NOTE — PLAN OF CARE
Goal Outcome Evaluation:              Outcome Evaluation: Patient is alert, confused, and calm for most of the shift with moments of confusion, agitation, and acting out or trying to exit the bed. Bed alarms and safety precautions in place. No adventitious findings or events noted throughout day shift. Will continue to monitor and follow pt and MD plan of care per MD order.

## 2022-11-13 NOTE — PLAN OF CARE
Goal Outcome Evaluation:   Patient has been resting comfortably with no acute signs of distress. VSS. AO x1. Room air. Dressing C/D/I. No agitation overnight but declined all attempts at po medication. Will continue to monitor as patient progresses in their care.

## 2022-11-14 LAB
ANION GAP SERPL CALCULATED.3IONS-SCNC: 10 MMOL/L (ref 5–15)
BUN SERPL-MCNC: 24 MG/DL (ref 8–23)
BUN/CREAT SERPL: 22 (ref 7–25)
CALCIUM SPEC-SCNC: 9.1 MG/DL (ref 8.6–10.5)
CHLORIDE SERPL-SCNC: 104 MMOL/L (ref 98–107)
CO2 SERPL-SCNC: 25 MMOL/L (ref 22–29)
CREAT SERPL-MCNC: 1.09 MG/DL (ref 0.76–1.27)
EGFRCR SERPLBLD CKD-EPI 2021: 75.3 ML/MIN/1.73
GLUCOSE BLDC GLUCOMTR-MCNC: 121 MG/DL (ref 70–130)
GLUCOSE BLDC GLUCOMTR-MCNC: 136 MG/DL (ref 70–130)
GLUCOSE BLDC GLUCOMTR-MCNC: 90 MG/DL (ref 70–130)
GLUCOSE SERPL-MCNC: 137 MG/DL (ref 65–99)
POTASSIUM SERPL-SCNC: 4.1 MMOL/L (ref 3.5–5.2)
SODIUM SERPL-SCNC: 139 MMOL/L (ref 136–145)
VANCOMYCIN TROUGH SERPL-MCNC: 16.3 MCG/ML (ref 5–20)

## 2022-11-14 PROCEDURE — 80202 ASSAY OF VANCOMYCIN: CPT

## 2022-11-14 PROCEDURE — 99232 SBSQ HOSP IP/OBS MODERATE 35: CPT | Performed by: INTERNAL MEDICINE

## 2022-11-14 PROCEDURE — 25010000002 CEFTRIAXONE PER 250 MG

## 2022-11-14 PROCEDURE — 82962 GLUCOSE BLOOD TEST: CPT

## 2022-11-14 PROCEDURE — 80048 BASIC METABOLIC PNL TOTAL CA: CPT

## 2022-11-14 PROCEDURE — 97530 THERAPEUTIC ACTIVITIES: CPT

## 2022-11-14 PROCEDURE — 25010000002 VANCOMYCIN PER 500 MG

## 2022-11-14 PROCEDURE — 25010000002 HEPARIN (PORCINE) PER 1000 UNITS: Performed by: ORTHOPAEDIC SURGERY

## 2022-11-14 RX ADMIN — LISINOPRIL 40 MG: 40 TABLET ORAL at 09:29

## 2022-11-14 RX ADMIN — OLANZAPINE 5 MG: 5 TABLET, FILM COATED ORAL at 09:26

## 2022-11-14 RX ADMIN — VANCOMYCIN HYDROCHLORIDE 750 MG: 750 INJECTION, SOLUTION INTRAVENOUS at 16:18

## 2022-11-14 RX ADMIN — METFORMIN HYDROCHLORIDE 1000 MG: 1000 TABLET, FILM COATED ORAL at 17:25

## 2022-11-14 RX ADMIN — NIFEDIPINE 30 MG: 30 TABLET, FILM COATED, EXTENDED RELEASE ORAL at 09:26

## 2022-11-14 RX ADMIN — HEPARIN SODIUM 5000 UNITS: 5000 INJECTION INTRAVENOUS; SUBCUTANEOUS at 14:11

## 2022-11-14 RX ADMIN — HEPARIN SODIUM 5000 UNITS: 5000 INJECTION INTRAVENOUS; SUBCUTANEOUS at 06:20

## 2022-11-14 RX ADMIN — SODIUM CHLORIDE 2 G: 900 INJECTION INTRAVENOUS at 10:30

## 2022-11-14 RX ADMIN — METFORMIN HYDROCHLORIDE 1000 MG: 1000 TABLET, FILM COATED ORAL at 09:29

## 2022-11-14 RX ADMIN — HEPARIN SODIUM 5000 UNITS: 5000 INJECTION INTRAVENOUS; SUBCUTANEOUS at 22:21

## 2022-11-14 RX ADMIN — SENNOSIDES AND DOCUSATE SODIUM 2 TABLET: 50; 8.6 TABLET ORAL at 09:29

## 2022-11-14 RX ADMIN — GLIPIZIDE 2.5 MG: 5 TABLET ORAL at 09:28

## 2022-11-14 RX ADMIN — Medication 10 ML: at 09:26

## 2022-11-14 RX ADMIN — FERROUS SULFATE TAB 325 MG (65 MG ELEMENTAL FE) 325 MG: 325 (65 FE) TAB at 09:28

## 2022-11-14 RX ADMIN — GLIPIZIDE 2.5 MG: 5 TABLET ORAL at 17:25

## 2022-11-14 RX ADMIN — OLANZAPINE 10 MG: 5 TABLET, FILM COATED ORAL at 22:21

## 2022-11-14 RX ADMIN — VANCOMYCIN HYDROCHLORIDE 750 MG: 750 INJECTION, SOLUTION INTRAVENOUS at 03:50

## 2022-11-14 NOTE — PLAN OF CARE
Goal Outcome Evaluation:            Pt. Has been disoriented to time throughout the day. VSS, on room air. Pt. Worked with therapy and sat up in the chair this afternoon. Awaiting placement.

## 2022-11-14 NOTE — PLAN OF CARE
Problem: Fall Injury Risk  Goal: Absence of Fall and Fall-Related Injury  Intervention: Identify and Manage Contributors  Recent Flowsheet Documentation  Taken 11/13/2022 2040 by Erlinda Matute RN  Medication Review/Management: medications reviewed  Intervention: Promote Injury-Free Environment  Recent Flowsheet Documentation  Taken 11/13/2022 2349 by Erlinda Matute RN  Safety Promotion/Fall Prevention:   safety round/check completed   clutter free environment maintained   assistive device/personal items within reach  Taken 11/13/2022 2200 by Erlinda Matute RN  Safety Promotion/Fall Prevention:   safety round/check completed   clutter free environment maintained   assistive device/personal items within reach  Taken 11/13/2022 2040 by Erlinda Matute RN  Safety Promotion/Fall Prevention:   safety round/check completed   nonskid shoes/slippers when out of bed   fall prevention program maintained   clutter free environment maintained   assistive device/personal items within reach   activity supervised   Goal Outcome Evaluation:   Patient appears to be resting comfortably with no acute signs of distress. VSS. AO x1. Room air. Dressing C/D/I. Some restlessness later this shift and refused to take his heparin shot.CM seeking SNF placement. Will continue to monitor as patient progresses in their care.

## 2022-11-14 NOTE — PROGRESS NOTES
INFECTIOUS DISEASE Progress Note    Omkar Nava  1955  [id number]    Consult: 10/15/22  Admit date: 10/14/2022    Requesting Provider: Omkar Seth MD  Evaluating physician:  Rayray Gordon MD  Reason for Consultation: left foot osteomyelitis, first toe, distal phalanx  Chief Complaint: left foot pain      Subjective   History of present illness:  Patient is a  67 y.o. male with h/o T2DM, PN, HTN, normocytic anemia, and GERD who presented to [facility] ED on 10/14 for worsening left foot pain.  The patient is a poor historian and most of the information was taken from the chart.  He tripped at his nursing facility, [facility], while running the street in flip-flops and sustained a laceration of his first webspace.  He was taken to [facility] ED on 9/17/22 and found to have a small intra-articular vertical fracture through the base the the great toe proximal phalanx. Podiatry, Dr. Ruiz, saw patient and the wound with irrigated with suture placement.  The area was dressed and he was placed in a surgical shoe.  He was given Cefazolin x 1 dose and discharged back to his facility on Keflex for 7 days.  On 9/26/22, the nursing staff at increased pain, swelling, and redness around the foot and sent him to [facility] ED on that day.  He was found to have a displaced fracture of proximal phalanx of left hallux along with abscess and gas in tissues.  He was admitted to the hospital and underwent Left foot debridement and I and D on 9/27/22 by Dr. Ruiz with culture positive for MRSA (Resistant to Cefazolin, clindamycin, erm, oxacillin, tetracycline, Bactrim and sensitive to Vancomycin, Daptomycin-KB 1, and Linezolid KB 2), Corynebacterium striatum, and Enterococcus faecalis (sens to Vanc and ampicillin).  He underwent a second debridement and I and D on 9/30.  He was given Cefazolin in ED and then changed to Daptomycin.  A PICC line was placed on 10/3 for Daptomycin infusions,  but the antibiotic was too expensive for the SNF, so he was changed to oral Zyvox and Rifampin until 10/26.  The PICC line was removed on 10/4.  His blood cultures remained negative.  He was discharged back to his nursing facility on 10/4/22.      He was sent to [facility] ED on 10/14 after nursing staff noted that his left hallux appeared necrotic.  He has had no fever, chills, shortness of breath, nausea, vomiting, diarrhea, dysuria, or worsening foot pain.  On arrival, he is afebrile and hemodynamically stable.  On 10/15, his BP went up to 203/81.  Admitting labs were WBC 7200 with 69% neutrophils, ESR 67, CRP 3.07, Hgb 9.2, PCT 0.05, lactic acid 2.3, and creatinine 1.21.  A MRSA PCR was negative.  Blood and wound cultures are pending.  An MRI of the left foot on 10/15 showed wound around the left great toe with marrow signal alteration within the distal aspect of 1st MT as well as both phalanges c/w osteomyelitis and scattered foci in soft tissues that may be foci of gas without evidence of fluid collections or abscesses. An Xray of left foot showed soft tissue swelling of 1st digit with displaced fracture of medial base of 1st proximal phalanx.  He is currently on Cefepime and Vancomycin.  ID was asked by Dr. Seth on 10/15 to evaluate and manage his antibiotic therapy.  Patient has had some issues with psychosis over the past 6 months possibly related to drug and alcohol use.  This is also interfered with his acceptance of care.    10/16/2022 history reviewed.  Patient more pleasant this morning.  No high fevers or chills.  Tolerating vancomycin and cefepime, duration to be determined.  Orthopedics following.  Previous history of MRSA.    10/17/22: history reviewed.  Patient with minimal response. Tmax 99.6.  On Vancomycin and Cefepime for left hallux osteomyelitis and surrounding infection with duration to be determined.  Awaiting evaluate by Dr. Petty as per Dr. Lieberman's note.  More cooperative.    10/18/2022 history  reviewed.  Patient awaiting a left hallux ray amputation by orthopedic surgery.  Tolerating vancomycin and cefepime.    10/19/2022 history reviewed.  The patient underwent a first toe ray resection on his left foot by Dr. Yeison Petty on 10/18/2022.  Continues on vancomycin and cefepime until 11/25/2022.  No high fevers or chills.    10/20/2022 history reviewed.  Status post left foot first ray toe resection on 10/18, with previous history of MRSA, and new infection with Raoultella ornithinolytica.  Tolerating vancomycin and ceftriaxone until 11/25/2022.  Waiting on placement.    10/24/2022 history reviewed.  No high fevers.  Status post left foot first ray toe resection on 10/18, with previous history of MRSA, and new infection with Raoultella ornithinolytica.  Continues on ceftriaxone and vancomycin until 11/25/2022.    10/25/2022 history reviewed.  Status post left foot first toe resection 10/18/2022 with cultures previously positive for MRSA, and now for Klebsiella and Raoultella.  Mental status has been a limiting factor with his psychiatric history in terms of treatment.  He is tolerating vancomycin and ceftriaxone to continue until 11/25/2022.  Placement is in progress.    10/26/2022 history reviewed.  Status post left first toe resection 10/18/2022 for osteomyelitis.  Tolerating ceftriaxone and vancomycin treating MRSA, Klebsiella, and other bacteria until 11/25/2022.  Awaiting placement, especially given his mental status and psychiatric history.  No high fever.  Pain controlled.    10/27/2022 history reviewed.  Continues on vancomycin and ceftriaxone until 11/25/2022 for left first toe osteomyelitis.  Status post resection 10/18/2022.  Awaiting placement.  No high fever.    10/31/2022 history reviewed.  Continues on antibiotics for left first toe osteomyelitis until 11/25.  Status post resection 10/18.  Confused off and on.  Awaiting placement.  No fever.    11/1/2022 history reviewed.  Tolerating  ceftriaxone and vancomycin until 11/25/2022 for left first toe osteomyelitis status post resection 10/18.  Pleasant but confused.  Awaiting placement.  No fever.  No pain.    11/2/2022 history reviewed.  On vancomycin and ceftriaxone until 11/25 for left toe osteomyelitis status post resection 10/18/2022.  Placement has been difficult as places or refusing him.  No fever.    11/3/2022 history reviewed.  Tolerating ceftriaxone and vancomycin until 11/25/2022 for left first toe osteomyelitis polymicrobial status post resection 10/18/2022.  Still a difficult placement issue.  No fever.    11/4/2022 history reviewed.  Continues on vancomycin and ceftriaxone until 11/25 for left first toe osteomyelitis polymicrobial, status post amputation and resection 10/18/2022.  More amendable to care.  No fever.  Waiting on placement.    11/7/2022 history reviewed.  Tolerating ceftriaxone and vancomycin until 11/25 for left first toe osteomyelitis status post resection 10/18.  No high fever.    11/8/2022 history reviewed.  Continues on antibiotics till 11/25 for left first toe osteomyelitis status post resection 10/18.  No fever.    11/9/2022 history reviewed.  Continues on vancomycin and ceftriaxone until 11/25 for left first toe osteomyelitis.  Pleasant but confused.  No fever.    11/10/2022 history reviewed.  Tolerating antibiotics until 11/25 for left first toe osteomyelitis.  No high fevers.  Having difficulties being placed for his IV antibiotics and wound care.  No pain.    11/11/2022 history reviewed.  Continues on vancomycin and ceftriaxone until 11/25 for left first toe osteomyelitis.  No fever.  Watching TV and comfortable.  Waiting on placement.    11/12/22 hx rev.  Angela ab till 11/25 for left foot OM.  No pain or fever.    11/14/2022 history reviewed.  Continues on antibiotics until 11/25 for left foot osteomyelitis and difficulty in placement for multiple reasons.  No high fever.  No pain.  Tolerating vancomycin and  ceftriaxone.    Past Medical History:   Diagnosis Date   • Diabetes mellitus (HCC)    • GERD (gastroesophageal reflux disease)    HTN  Psychotic d/o, unspecified.    Past Surgical History:   Procedure Laterality Date   • AMPUTATION FOOT Left 10/18/2022    Procedure: PARTIAL FIRST RAY AMPUTATION LEFT;  Surgeon: Yeison Petty MD;  Location: [facility];  Service: Orthopedics;  Laterality: Left;   • EYE SURGERY     Left foot debridement/I and D x 2 9/27/22 and 9/30/22.  Left first ray amputation 10/18/2022.    Pediatric History   Patient Parents   • Not on file     Other Topics Concern   • Not on file   Social History Narrative    Caffeine 0-1 servings per day    Patient lives at home .   Patient lives at [facility]  Former smoker, no alcohol, smokes marijuana.     family history includes Diabetes in his brother, brother, father, maternal grandfather, maternal grandmother, mother, and sister; Hypertension in his brother, brother, father, and mother.    No Known Allergies      There is no immunization history on file for this patient.    Medication:    Current Facility-Administered Medications:   •  !Vancomycin trough level on 11/14 @1300, please hold vancomycin dose until pharmacy evaluates, , Does not apply, Once, Adrián Padron [facility]  •  acetaminophen (TYLENOL) tablet 650 mg, 650 mg, Oral, Q4H PRN, 650 mg at 11/08/22 1942 **OR** [DISCONTINUED] acetaminophen (TYLENOL) suppository 650 mg, 650 mg, Rectal, Q4H PRN, Yeison Petty MD  •  bisacodyl (DULCOLAX) suppository 10 mg, 10 mg, Rectal, Daily PRN, Yeison Petty MD  •  cefTRIAXone (ROCEPHIN) 2 g/100 mL 0.9% NS IVPB (MBP), 2 g, Intravenous, Q24H, Hero Ty, [facility], Last Rate: 200 mL/hr at 11/12/22 1103, 2 g at 11/14/22 1030  •  dextrose (D50W) (25 g/50 mL) IV injection 25 g, 25 g, Intravenous, Q15 Min PRN, Yeison Petty MD  •  dextrose (GLUTOSE) oral gel 15 g, 15 g, Oral, Q15 Min PRN, Yeison Petty MD  •  ferrous sulfate tablet 325 mg, 325 mg, Oral,  Daily With Breakfast, Yeison Petty MD, 325 mg at 22  •  glipizide (GLUCOTROL) tablet 2.5 mg, 2.5 mg, Oral, BID AC, Elena Ugalde PA-C, 2.5 mg at 22  •  glucagon (human recombinant) (GLUCAGEN DIAGNOSTIC) injection 1 mg, 1 mg, Intramuscular, Q15 Min PRN, Yeison Petty MD  •  heparin (porcine) 5000 UNIT/ML injection 5,000 Units, 5,000 Units, Subcutaneous, Q8H, Yeison Petty MD, 5,000 Units at 22 0620  •  lisinopril (PRINIVIL,ZESTRIL) tablet 40 mg, 40 mg, Oral, Daily, Shahbaz Kuhn MD, 40 mg at 22  •  magnesium sulfate 4 gram infusion - Mg less than or equal to 1mg/dL, 4 g, Intravenous, PRN **OR** magnesium sulfate 3 gram infusion (1gm x 3) - Mg 1.1 - 1.5 mg/dL, 1 g, Intravenous, PRN **OR** Magnesium Sulfate 2 gram infusion- Mg 1.6 - 1.9 mg/dL, 2 g, Intravenous, PRN, Yeison Petty MD  •  melatonin tablet 5 mg, 5 mg, Oral, Nightly PRN, Yeison Petty MD, 5 mg at 22 2040  •  metFORMIN (GLUCOPHAGE) tablet 1,000 mg, 1,000 mg, Oral, BID With Meals, Elena Ugalde PA-C, 1,000 mg at 22  •  [] HYDROmorphone (DILAUDID) injection 0.5 mg, 0.5 mg, Intravenous, Q2H PRN, 0.5 mg at 10/24/22 1449 **AND** naloxone (NARCAN) injection 0.1 mg, 0.1 mg, Intravenous, Q5 Min PRN, Yeison Petty MD  •  NIFEdipine XL (PROCARDIA XL) 24 hr tablet 30 mg, 30 mg, Oral, Q24H, Elena Ugalde PA-C, 30 mg at 22  •  OLANZapine (zyPREXA) tablet 5 mg, 5 mg, Oral, Daily, 5 mg at 22 **AND** OLANZapine (zyPREXA) tablet 10 mg, 10 mg, Oral, Nightly, Nicole Henriquez DO, 10 mg at 22  •  OLANZapine zydis (zyPREXA) disintegrating tablet 5 mg, 5 mg, Oral, Daily PRN, Elena Ugalde PA-C  •  polyethylene glycol (MIRALAX) packet 17 g, 17 g, Oral, PRN, Yeison Petty MD, 17 g at 22 0802  •  sennosides-docusate (PERICOLACE) 8.6-50 MG per tablet 2 tablet, 2 tablet, Oral, BID, Elena Ugalde PA-C, 2 tablet at 22  "0929  •  Insert peripheral IV, , , Once **AND** sodium chloride 0.9 % flush 10 mL, 10 mL, Intravenous, PRN, Yeison Petty MD, 10 mL at 11/07/22 0435  •  sodium chloride 0.9 % flush 10 mL, 10 mL, Intravenous, Q12H, Nicole Henriquez DO, 10 mL at 11/14/22 0926  •  vancomycin in dextrose 5% 150 mL (VANCOCIN) IVPB 750 mg, 750 mg, Intravenous, Q12H, Hero Ty, formerly [facility], Last Rate: 150 mL/hr at 11/12/22 1523, 750 mg at 11/14/22 0350  •  ziprasidone (GEODON) injection 10 mg, 10 mg, Intramuscular, Q4H PRN, Wisam Delaney MD    Please refer to the medical record for a full medication list    Review of Systems:  Unable to get a reliable history as patient is responding with yes and no to basic questions and has a psychotic disorder.  Denies fever, chills, nausea, vomiting, diarrhea, shortness of breath, dysuria, or rashes.    Physical Exam:   Vital Signs   Temp:  [97.7 °F (36.5 °C)-98.3 °F (36.8 °C)] 97.7 °F (36.5 °C)  Heart Rate:  [66-84] 73  Resp:  [18] 18  BP: (131-138)/(58-65) 138/65    Temp  Min: 97.7 °F (36.5 °C)  Max: 98.3 °F (36.8 °C)  BP  Min: 131/59  Max: 138/65  Pulse  Min: 66  Max: 84  Resp  Min: 18  Max: 18  SpO2  Min: 96 %  Max: 100 %    Blood pressure 138/65, pulse 73, temperature 97.7 °F (36.5 °C), temperature source Axillary, resp. rate 18, height 182.9 cm (72\"), weight 87 kg (191 lb 12.8 oz), SpO2 100 %.  GENERAL: Awake and alert, in no acute distress. Appears older than stated age.  Resting in bed.    HEENT:  Normocephalic, atraumatic.  Oropharynx without thrush.   EYES: No conjunctival injection. No icterus. EOM full.  LYMPHATICS: No lymphadenopathy of the neck or axillary or inguinal regions.   HEART: No murmur, gallop, or pericardial friction rub. Reg rate rhythm.    LUNGS: Clear to auscultation and percussion. No respiratory distress, no use of accessory muscles.    ABDOMEN: Soft, nontender, nondistended. No appreciable HSM.  Bowel sounds normal.  SKIN: Warm and dry without cutaneous eruptions.   Left " foot dressing in place, right arm PICC okay placed 10/27.  Splint in place.  No drainage.  PSYCHIATRIC: Mental status with occasional confusion.  But overall pleasant.  Can follow commands at times.    EXT: Left foot surgical dressing in place.  No crepitus, some drainage from webspace. Normal range of motion.  NEURO: Oriented to name, nonfocal.  Follows some commands and pleasant with me.    Results Review:   I reviewed the patient's new clinical results.  I reviewed the patient's new imaging results and agree with the interpretation.  I reviewed the patient's other test results and agree with the interpretation    Results from last 7 days   Lab Units 11/11/22  0627 11/08/22  0503   WBC 10*3/mm3 6.95 8.71   HEMOGLOBIN g/dL 8.8* 8.8*   HEMATOCRIT % 29.4* 32.7*   PLATELETS 10*3/mm3 352 410     Results from last 7 days   Lab Units 11/14/22  0514   SODIUM mmol/L 139   POTASSIUM mmol/L 4.1   CHLORIDE mmol/L 104   CO2 mmol/L 25.0   BUN mg/dL 24*   CREATININE mg/dL 1.09   GLUCOSE mg/dL 137*   CALCIUM mg/dL 9.1     Results from last 7 days   Lab Units 11/11/22  0627   ALK PHOS U/L 107   BILIRUBIN mg/dL <0.2   ALT (SGPT) U/L 15   AST (SGOT) U/L 17             Results from last 7 days   Lab Units 11/07/22  1127   VANCOMYCIN RM mcg/mL 24.60         Estimated Creatinine Clearance: 83.1 mL/min (by C-G formula based on SCr of 1.09 mg/dL).  CPK    Common Labsle 10/19/22   Creatine Kinase 119            Procalitonin Results:       Brief Urine Lab Results  (Last result in the past 365 days)      Color   Clarity   Blood   Leuk Est   Nitrite   Protein   CREAT   Urine HCG        06/12/22 2056 Yellow   Clear     Negative                    No results found for: SITE, ALLENTEST, PHART, BBG1DAY, PO2ART, NFM7JUZ, BASEEXCESS, H8YHMGOZ, HGBBG, HCTABG, OXYHEMOGLOBI, METHHGBN, CARBOXYHGB, CO2CT, BAROMETRIC, MODALITY, FIO2     Microbiology:  Microbiology Results (last 10 days)     ** No results found for the last 240 hours. **        Blood and  wound cultures 10/14 pending.       No results found for: TISSCXQ, CULTURES, CULTURE, BLOODCX, ANACX, BALCX, ACIDFASTCX, BODYFLDCX, CXREFLEX, FUNGUSCX, RESPCX, MRSACX, ROUTCX, BRCHWSHCLT, TISSUECX, URINECX, CMVCX, WOUNDCX, BCIDPCR, BFCULTURE, BLOODCULT(      Radiology:  Imaging Results (Last 72 Hours)     ** No results found for the last 72 hours. **          IMPRESSION:   1. Left hallux osteomyelitis after trauma/displaced fracture 9/17.  At risk for fracture not healing in the first toe given the infection, noncompliance with walking on foot, and ongoing infection.  Likely gangrene and poor wound healing related to vascular disease.  Resolving after surgery 10/18.  2. 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  Cx with MRSA, Enterococcus faecalis, and Corynebacterium striatum.  Initially treated with Daptomycin and discharge on oral Zyvox and oral Rifampin until 10/26/22.  Cx with Raoultella ornithinolytica (sens to Cefepime, ceftriaxone) and Klebsiella.  Status post left first ray resection 10/18/2022.  Improved.  3. Lactic acidosis, related to above. Resolved.   4. Elevated inflammatory markers, related to above.  CRP 3.07 on 10/14.  CRP 3.10 on 10/26.  5. Type 2 diabetes mellitus/peripheral neuropathy.  Affects wound healing.  6. Hyperglycemia, related to above.  7. Anemia of chronic disease.  Ongoing.  8. Essential hypertension.  Controlled.  9. Gastroesophageal reflux disease.  10. Thrombocytosis.  Related to above issues.  Resolved.  11. Psychosis since April 2022 reported by the patient's sister, thought to be related to drug and alcohol use.  Was hospitalized previously at [facility].  Awaiting placement.  Seems to be more even keel.  12. Hyponatremia, resolved.  13. H/o MRSA.  Prob involved left foot as well.  14. Hypocalcemia resolved.  15. Elevated alkaline phosphatase resolved.    Tolerating current regimen.  Discussed previously with nursing  and family.  Main issue is placement.  Discussed with case management.    Plan:  1. Diagnostically, follow blood and wound cultures, imaging, physical examination, CBC, CMP, CRP, and vancomycin levels.   2. Therapeutically, continue ceftriaxone and Vancomycin for 6 weeks of IV antibiotics until 11/25/22.  This will cover the organisms including previous MRSA.  Vancomycin dose previously increased to 1 g IV every 12 hours.  3. Orthopedic surgery status post left first ray resection for osteomyelitis 10/18/2022.   4. Awaiting placement.  5. Continue supportive care.  PICC placement right arm 10/27/2022.  Placement major issue.    I discussed the patient's findings and my recommendations with patient and his family.  He will need to be in a skilled facility to receive his antibiotics.    Our group would be pleased to follow this patient over the course of their hospitalization and assist with outpatient antimicrobial therapy, as indicated.  Further recommendations depend on the results of the cultures and clinical course. Discussed with patient's sister and family.  Difficult issue with compliance in terms of wound care and staying off his foot.      Case management orders: Please arrange for skilled facility IV antibiotics with ceftriaxone 2 g IV daily, vancomycin 1 g IV every 12 hours to continue until 11/25/2022.  Pharmacy to adjust dose of vancomycin based on weekly trough levels to try to maintain level between 12 and 18.  Check CBC, CMP, CRP, vancomycin trough weekly while on IV antibiotics.  Fax orders to [phone], call [phone] with final arrangements.  The treatment is for left first toe osteomyelitis with MRSA, Klebsiella, and Raoultella ornithinolytica.  Arrange for follow-up with me in 2 weeks post discharge.  Weekly PICC dressing changes.    Rayray Gordon MD  11/14/2022I

## 2022-11-14 NOTE — PROGRESS NOTES
Pharmacy Consult-Vancomycin Dosing  Omkar Nava is a 67 y.o. male receiving vancomycin therapy.     Indication: L foot osteomyelitis   Consulting Provider: Hospitalist  ID Consult: SARAH Gordon     Goal AUC: 400 - 600 mg/L*hr    Current Antimicrobial Therapy  Anti-Infectives (From admission, onward)      Ordered     Dose/Rate Route Frequency Start Stop    11/13/22 1322  !Vancomycin trough level on 11/14 @1300, please hold vancomycin dose until pharmacy evaluates        Ordering Provider: Adrián Padron RPH     Does not apply Once 11/14/22 1400      11/07/22 0713  vancomycin in dextrose 5% 150 mL (VANCOCIN) IVPB 750 mg        Ordering Provider: Hero Ty RPH    750 mg  over 60 Minutes Intravenous Every 12 Hours 11/07/22 1500 11/26/22 1459    10/31/22 1407  vancomycin in dextrose 5% 150 mL (VANCOCIN) IVPB 750 mg        Ordering Provider: Carolina Arreaga RPH    750 mg  over 60 Minutes Intravenous Every 12 Hours 10/31/22 1500 11/07/22 0534    10/27/22 0851  cefTRIAXone (ROCEPHIN) 2 g/100 mL 0.9% NS IVPB (MBP)        Ordering Provider: Nicole Henriquez DO    2 g Intravenous Every 24 Hours 10/27/22 0000 11/19/22 2359    10/27/22 0851  vancomycin (VANCOCIN) 1-5 GM/200ML-% IVPB        Ordering Provider: Nicole Henriquez DO    1,000 mg Intravenous Every 12 Hours 10/27/22 0000 11/19/22 2359    10/20/22 1010  cefTRIAXone (ROCEPHIN) 2 g/100 mL 0.9% NS IVPB (MBP)        Ordering Provider: Hero yT RPH    2 g  over 30 Minutes Intravenous Every 24 Hours 10/20/22 1100 11/26/22 1059    10/14/22 2306  cefepime (MAXIPIME) 2 g/100 mL 0.9% NS (mbp)        Ordering Provider: Yeison Petty MD    2 g  over 4 Hours Intravenous Every 8 Hours 10/15/22 0800 10/20/22 0621    10/14/22 2306  cefepime (MAXIPIME) 2 g/100 mL 0.9% NS (mbp)        Ordering Provider: Norma Wei APRN    2 g  200 mL/hr over 30 Minutes Intravenous Once 10/14/22 2308 10/15/22 0142    10/14/22 2306  Pharmacy to dose vancomycin        Ordering  "Provider: Yeison Petty MD     Does not apply Continuous PRN 10/14/22 2306 10/19/22 2305    10/14/22 2029  vancomycin 1750 mg/500 mL 0.9% NS IVPB ([facility])        Ordering Provider: Omkar Seth MD    20 mg/kg × 91.2 kg Intravenous Once 10/14/22 2031 10/14/22 2157    10/14/22 2029  cefepime (MAXIPIME) 2 g/100 mL 0.9% NS (mbp)        Ordering Provider: Omkar Seth MD    2 g  200 mL/hr over 30 Minutes Intravenous Once 10/14/22 2031 10/14/22 2155          Allergies  Allergies as of 10/14/2022    (No Known Allergies)     Labs  Results from last 7 days   Lab Units 11/14/22  0514 11/13/22  0749 11/12/22  0429   BUN mg/dL 24* 22 24*   CREATININE mg/dL 1.09 1.11 0.85     Results from last 7 days   Lab Units 11/11/22  0627 11/08/22  0503   WBC 10*3/mm3 6.95 8.71     Evaluation of Dosing     Last Dose Received in the ED/Outside Facility: 1750 mg (19mg/kg) on 10/14 @ 2157  Is Patient on Dialysis or Renal Replacement: No    Ht - 182.9 cm (72\")  Wt - 87 kg (191 lb 12.8 oz)    Estimated Creatinine Clearance: 83.1 mL/min (by C-G formula based on SCr of 1.09 mg/dL).    Intake & Output (last 3 days)         11/11 0701 11/12 0700 11/12 0701 11/13 0700 11/13 0701 11/14 0700 11/14 0701  11/15 0700    P.O.  275 360 450    IV Piggyback 700 1750 150     Total Intake(mL/kg) 700 (8) 2025 (23.2) 510 (5.9) 450 (5.2)    Urine (mL/kg/hr) 2100 (1) 775 (0.4) 1150 (0.6) 1150 (1.6)    Stool 0 0 0     Total Output 2100 775 1150 1150    Net -1400 +1250 -640 -700            Urine Unmeasured Occurrence 3 x 2 x 2 x     Stool Unmeasured Occurrence 4 x 1 x 2 x           Microbiology and Radiology  Microbiology Results (last 10 days)       ** No results found for the last 240 hours. **          Reported Vancomycin Levels  Results from last 7 days   Lab Units 11/14/22  1342   VANCOMYCIN TR mcg/mL 16.30     [employer] AUC Calculation  Current AUC: 423 mg/L*hr  Predicted Steady State AUC on Current Dose: 459 " mg/L*hr    Assessment/Plan  Pharmacy to dose vancomycin for L foot OM.   Patient currently on a maintenance dose of vancomycin 750mg (~8 mg/kg) IV q12h.  Today's vanc level again correlates with therapeutic steady state AUC, so will continue the current regimen and reassess clearance by trough level next week -- sooner with any change in renal function. Note SCr remains fairly stable and patient is voiding.   Pharmacy will continue to monitor renal function, cultures and sensitivities, and clinical status to adjust regimen as necessary.    Thank you,  Alexandra Tejeda, PharmD, BCPS  11/14/2022  15:07 EST

## 2022-11-14 NOTE — PLAN OF CARE
Goal Outcome Evaluation:  Plan of Care Reviewed With: patient        Progress: improving  Outcome Evaluation: Pt pleasant and motivated to participate in therapy this session. Pt performed STS x3 from recliner with ModA and RW, requiring frequent cues for proper technique for safety. Pt performed stand pivot transfer from chair to bed with ModAx1 and RW. PT reviewed heel weight bearing precautions with forefoot offloading shoe. PT plans to continue progressing PT POC as tolerated.

## 2022-11-14 NOTE — CASE MANAGEMENT/SOCIAL WORK
Continued Stay Note  [facility]     Patient Name: Omkar Nava  MRN: [id number]  Today's Date: 11/14/2022    Admit Date: 10/14/2022    Plan: [facility]   Discharge Plan     Row Name 11/14/22 1447       Plan    Plan [facility]    Patient/Family in Agreement with Plan yes    Plan Comments [facility] has clinically accepted Mr. Nava. They are waiting their  to return from sick leave to obtain Mr. Nava's financial information. I called and left a voicemail for Teresa, admissions liaison with [facility], to see if their  had returned from her sick leave and will await her return phone call. Case management will continue to follow.    Final Discharge Disposition Code 03 - skilled nursing facility (SNF)               Discharge Codes    No documentation.               Expected Discharge Date and Time     Expected Discharge Date Expected Discharge Time    Nov 18, 2022             Josep Sweeney RN

## 2022-11-14 NOTE — THERAPY TREATMENT NOTE
Patient Name: Omkar Nava  : 1955    MRN: [id number]                              Today's Date: 2022       Admit Date: 10/14/2022    Visit Dx:     ICD-10-CM ICD-9-CM   1. Osteomyelitis of toe of left foot (HCC)  M86.9 730.27   2. Anemia, unspecified type  D64.9 285.9   3. Hyperglycemia  R73.9 790.29   4. 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, without long-term current use of insulin   • Orthostatic hypotension   • Acute osteomyelitis of left foot (HCC)   • Type 2 diabetes mellitus (HCC)   • Essential hypertension   • Psychotic disorder (HCC)   • Neurocognitive disorder     Past Medical History:   Diagnosis Date   • Diabetes mellitus (HCC)    • GERD (gastroesophageal reflux disease)      Past Surgical History:   Procedure Laterality Date   • AMPUTATION FOOT Left 10/18/2022    Procedure: PARTIAL FIRST RAY AMPUTATION LEFT;  Surgeon: Yeison Petty MD;  Location: [facility];  Service: Orthopedics;  Laterality: Left;   • EYE SURGERY        General Information     Row Name 22 1606          Physical Therapy Time and Intention    Document Type therapy note (daily note)  -     Mode of Treatment physical therapy;individual therapy  -     Row Name 22 1606          General Information    Existing Precautions/Restrictions other (see comments)  L heel WBAT with forefoot offloading shoe on, R foot with cast shoe, dementia, blindness  -     Barriers to Rehab medically complex;cognitive status;visual deficit  -     Row Name 22 1609          Cognition    Orientation Status (Cognition) oriented to;person;situation;verbal cues/prompts needed for orientation;place;time  -     Row Name 22 1605          Safety Issues, Functional Mobility    Safety Issues Affecting  Function (Mobility) awareness of need for assistance;insight into deficits/self-awareness;judgment;positioning of assistive device;problem-solving;safety precaution awareness;safety precautions follow-through/compliance;sequencing abilities  -     Impairments Affecting Function (Mobility) balance;cognition;coordination;endurance/activity tolerance;motor control;postural/trunk control;strength;visual/perceptual  -           User Key  (r) = Recorded By, (t) = Taken By, (c) = Cosigned By    Initials Name Provider Type     Alexander Hankins, PT Physical Therapist               Mobility     Row Name 11/14/22 1607          Bed Mobility    Sit-Supine Procious (Bed Mobility) moderate assist (50% patient effort);1 person assist;verbal cues;nonverbal cues (demo/gesture)  -     Assistive Device (Bed Mobility) head of bed elevated;bed rails  -     Comment, (Bed Mobility) Assist to position BLEs in bed, cues for proper technique.  -     Row Name 11/14/22 1607          Bed-Chair Transfer    Bed-Chair Procious (Transfers) moderate assist (50% patient effort);1 person assist;verbal cues;nonverbal cues (demo/gesture)  -     Assistive Device (Bed-Chair Transfers) walker, front-wheeled  -     Comment, (Bed-Chair Transfer) Pt took short shuffled steps from bed to chair with mild instability and posterior lean throughout.  -     Row Name 11/14/22 1607          Sit-Stand Transfer    Sit-Stand Procious (Transfers) moderate assist (50% patient effort);1 person assist;verbal cues;nonverbal cues (demo/gesture)  -     Assistive Device (Sit-Stand Transfers) walker, front-wheeled  -     Comment, (Sit-Stand Transfer) Pt performed STS x3 from recliner requiring frequent cues for proper hand placement for push off on arm rest and max cues for proper foot placement for ANGELICA. Pt resistant to scooting to edge of chair to allow for shift of COG over ANGELICA. Pt with posterior lean upon standing requiring manual cues to  correct along with narrow ANGELICA.  -     Row Name 11/14/22 1607          Mobility    Extremity Weight-bearing Status left lower extremity  -     Left Lower Extremity (Weight-bearing Status) weight-bearing as tolerated (WBAT);other (see comments)  heel WBAT w/ forefoot offloading shoe on  -           User Key  (r) = Recorded By, (t) = Taken By, (c) = Cosigned By    Initials Name Provider Type     Alexander Hankins, PT Physical Therapist               Obj/Interventions     Row Name 11/14/22 1610          Balance    Balance Assessment sitting static balance;sitting dynamic balance;standing static balance;standing dynamic balance  -     Static Sitting Balance supervision  -     Dynamic Sitting Balance contact guard  -     Position, Sitting Balance unsupported;sitting in chair  -     Static Standing Balance minimal assist;1-person assist  -     Dynamic Standing Balance moderate assist;1-person assist  -     Position/Device Used, Standing Balance supported;walker, front-wheeled  -     Comment, Balance Pt with moderate posterior lean during standing requiring frequent cues for anterior weight shift and for proper BUE support on RW as pt pulling RW posteriorly.  -           User Key  (r) = Recorded By, (t) = Taken By, (c) = Cosigned By    Initials Name Provider Type     Alexander Hankins, PT Physical Therapist               Goals/Plan    No documentation.                Clinical Impression     [facility] Name 11/14/22 1611          Pain    Pretreatment Pain Rating 0/10 - no pain  -     Posttreatment Pain Rating 0/10 - no pain  -[facility] Name 11/14/22 1611          Plan of Care Review    Plan of Care Reviewed With patient  -     Progress improving  -     Outcome Evaluation Pt pleasant and motivated to participate in therapy this session. Pt performed STS x3 from recliner with ModA and RW, requiring frequent cues for proper technique for safety. Pt performed stand pivot transfer from chair to bed with  ModAx1 and RW. PT reviewed heel weight bearing precautions with forefoot offloading shoe. PT plans to continue progressing PT POC as tolerated.  -[facility] Name 11/14/22 1611          Vital Signs    Pre Systolic BP Rehab 149  -     Pre Treatment Diastolic BP 60  -LH     O2 Delivery Pre Treatment room air  -     O2 Delivery Intra Treatment room air  -     O2 Delivery Post Treatment room air  -     Pre Patient Position Sitting  -     Intra Patient Position Standing  -     Post Patient Position Supine  -[facility] Name 11/14/22 1611          Positioning and Restraints    Pre-Treatment Position sitting in chair/recliner  -     Post Treatment Position bed  -     In Bed sitting;call light within reach;encouraged to call for assist;exit alarm on;side rails up x3  seizure pads in place  -           User Key  (r) = Recorded By, (t) = Taken By, (c) = Cosigned By    Initials Name Provider Type     Alexander Hankins, PT Physical Therapist               Outcome Measures     [facility] Name 11/14/22 1616 11/14/22 0800       How much help from another person do you currently need...    Turning from your back to your side while in flat bed without using bedrails? 3  - 4  -OD    Moving from lying on back to sitting on the side of a flat bed without bedrails? 3  - 4  -OD    Moving to and from a bed to a chair (including a wheelchair)? 2  - 3  -OD    Standing up from a chair using your arms (e.g., wheelchair, bedside chair)? 2  - 3  -OD    Climbing 3-5 steps with a railing? 1  - 2  -OD    To walk in hospital room? 2  - 2  -OD    AM-PAC 6 Clicks Score (PT) 13  - 18  -OD    Highest level of mobility 4 --> Transferred to chair/commode  - 6 --> Walked 10 steps or more  -OD    Row Name 11/14/22 1616          Functional Assessment    Outcome Measure Options AM-PAC 6 Clicks Basic Mobility (PT)  -           User Key  (r) = Recorded By, (t) = Taken By, (c) = Cosigned By    Initials Name Provider Type    OD  Abigail Montanez, RN Registered Nurse     Alexander Hankins, PT Physical Therapist                             Physical Therapy Education     Title: PT OT SLP Therapies (In Progress)     Topic: Physical Therapy (Done)     Point: Mobility training (Done)     Learning Progress Summary           Patient Acceptance, E,D, VU,NR by  at 11/14/2022 1616    Comment: Reviewed heel weight bearing precautions, safety with mobility, PT POC    Eager, E, VU by SC at 11/9/2022 1625    Comment: reviewed benefits of getting oob    Acceptance, E, VU by  at 10/26/2022 2333    Eager, E, VU by SC at 10/26/2022 1543    Comment: reviewed HEp    Acceptance, E, VU by  at 10/25/2022 2209    Acceptance, E, NR by SC at 10/25/2022 1633    Comment: reviewed benefits of sitting up    Acceptance, E, VU by  at 10/22/2022 2204    Acceptance, E, NL by SC at 10/19/2022 1512    Comment: reviewed benefits of actiity                   Point: Home exercise program (Done)     Learning Progress Summary           Patient Acceptance, E,D, VU,NR by  at 11/14/2022 1616    Comment: Reviewed heel weight bearing precautions, safety with mobility, PT POC    Eager, E, VU by SC at 11/9/2022 1625    Comment: reviewed benefits of getting oob    Acceptance, E, VU by  at 10/26/2022 2333    Eager, E, VU by SC at 10/26/2022 1543    Comment: reviewed HEp    Acceptance, E, VU by  at 10/25/2022 2209    Acceptance, E, NR by SC at 10/25/2022 1633    Comment: reviewed benefits of sitting up    Acceptance, E, VU by  at 10/22/2022 2204    Acceptance, E, NL by SC at 10/19/2022 1512    Comment: reviewed benefits of actiity                   Point: Body mechanics (Done)     Learning Progress Summary           Patient Acceptance, E,D, VU,NR by  at 11/14/2022 1616    Comment: Reviewed heel weight bearing precautions, safety with mobility, PT POC    Eager, E, VU by SC at 11/9/2022 1625    Comment: reviewed benefits of getting oob    Acceptance, E, VU by  at  10/26/2022 2333    Eager, E, VU by SC at 10/26/2022 1543    Comment: reviewed HEp    Acceptance, E, VU by  at 10/25/2022 2209    Acceptance, E, NR by SC at 10/25/2022 1633    Comment: reviewed benefits of sitting up    Acceptance, E, VU by  at 10/22/2022 2204    Acceptance, E, NL by SC at 10/19/2022 1512    Comment: reviewed benefits of actiity                   Point: Precautions (Done)     Learning Progress Summary           Patient Acceptance, E,D, VU,NR by  at 11/14/2022 1616    Comment: Reviewed heel weight bearing precautions, safety with mobility, PT POC    Eager, E, VU by SC at 11/9/2022 1625    Comment: reviewed benefits of getting oob    Acceptance, E, VU by  at 10/26/2022 2333    Eager, E, VU by SC at 10/26/2022 1543    Comment: reviewed HEp    Acceptance, E, VU by  at 10/25/2022 2209    Acceptance, E, NR by SC at 10/25/2022 1633    Comment: reviewed benefits of sitting up    Acceptance, E, VU by  at 10/22/2022 2204    Acceptance, E, NL by SC at 10/19/2022 1512    Comment: reviewed benefits of actiity                               User Key     Initials Effective Dates Name Provider Type Discipline    SC 06/16/21 -  Efra Cristobal, PT Physical Therapist PT     09/21/21 -  Alexander Hankins, PT Physical Therapist PT     09/22/22 -  Augusta Singleton RN Registered Nurse Nurse              PT Recommendation and Plan     Plan of Care Reviewed With: patient  Progress: improving  Outcome Evaluation: Pt pleasant and motivated to participate in therapy this session. Pt performed STS x3 from recliner with ModA and RW, requiring frequent cues for proper technique for safety. Pt performed stand pivot transfer from chair to bed with ModAx1 and RW. PT reviewed heel weight bearing precautions with forefoot offloading shoe. PT plans to continue progressing PT POC as tolerated.     Time Calculation:    PT Charges     Row Name 11/14/22 1616             Time Calculation    Start Time 1536  -      PT  Received On 11/14/22  -      PT Goal Re-Cert Due Date 11/17/22  -         Timed Charges    51252 - PT Therapeutic Activity Minutes 27  -LH         Total Minutes    Timed Charges Total Minutes 27  -       Total Minutes 27  -            User Key  (r) = Recorded By, (t) = Taken By, (c) = Cosigned By    Initials Name Provider Type     Alexander Hankins, PT Physical Therapist              Therapy Charges for Today     Code Description Service Date Service Provider Modifiers Qty    49860091489 HC PT THERAPEUTIC ACT EA 15 MIN 11/14/2022 Alexander Hankins, PT GP 2          PT G-Codes  Outcome Measure Options: AM-PAC 6 Clicks Basic Mobility (PT)  AM-PAC 6 Clicks Score (PT): 13  AM-PAC 6 Clicks Score (OT): 12    Alexander Hankins PT  11/14/2022

## 2022-11-15 LAB
ALBUMIN SERPL-MCNC: 3.2 G/DL (ref 3.5–5.2)
ALBUMIN/GLOB SERPL: 0.8 G/DL
ALP SERPL-CCNC: 99 U/L (ref 39–117)
ALT SERPL W P-5'-P-CCNC: 17 U/L (ref 1–41)
ANION GAP SERPL CALCULATED.3IONS-SCNC: 11 MMOL/L (ref 5–15)
AST SERPL-CCNC: 14 U/L (ref 1–40)
BASOPHILS # BLD AUTO: 0.04 10*3/MM3 (ref 0–0.2)
BASOPHILS NFR BLD AUTO: 0.6 % (ref 0–1.5)
BILIRUB SERPL-MCNC: <0.2 MG/DL (ref 0–1.2)
BUN SERPL-MCNC: 16 MG/DL (ref 8–23)
BUN/CREAT SERPL: 14.7 (ref 7–25)
CALCIUM SPEC-SCNC: 9.1 MG/DL (ref 8.6–10.5)
CHLORIDE SERPL-SCNC: 108 MMOL/L (ref 98–107)
CO2 SERPL-SCNC: 23 MMOL/L (ref 22–29)
CREAT SERPL-MCNC: 1.09 MG/DL (ref 0.76–1.27)
DEPRECATED RDW RBC AUTO: 48.2 FL (ref 37–54)
EGFRCR SERPLBLD CKD-EPI 2021: 75.3 ML/MIN/1.73
EOSINOPHIL # BLD AUTO: 0.25 10*3/MM3 (ref 0–0.4)
EOSINOPHIL NFR BLD AUTO: 3.7 % (ref 0.3–6.2)
ERYTHROCYTE [DISTWIDTH] IN BLOOD BY AUTOMATED COUNT: 14.6 % (ref 12.3–15.4)
GLOBULIN UR ELPH-MCNC: 4 GM/DL
GLUCOSE BLDC GLUCOMTR-MCNC: 151 MG/DL (ref 70–130)
GLUCOSE SERPL-MCNC: 141 MG/DL (ref 65–99)
HCT VFR BLD AUTO: 28.4 % (ref 37.5–51)
HGB BLD-MCNC: 8.7 G/DL (ref 13–17.7)
IMM GRANULOCYTES # BLD AUTO: 0.02 10*3/MM3 (ref 0–0.05)
IMM GRANULOCYTES NFR BLD AUTO: 0.3 % (ref 0–0.5)
LYMPHOCYTES # BLD AUTO: 1.55 10*3/MM3 (ref 0.7–3.1)
LYMPHOCYTES NFR BLD AUTO: 23.1 % (ref 19.6–45.3)
MCH RBC QN AUTO: 27.8 PG (ref 26.6–33)
MCHC RBC AUTO-ENTMCNC: 30.6 G/DL (ref 31.5–35.7)
MCV RBC AUTO: 90.7 FL (ref 79–97)
MONOCYTES # BLD AUTO: 0.59 10*3/MM3 (ref 0.1–0.9)
MONOCYTES NFR BLD AUTO: 8.8 % (ref 5–12)
NEUTROPHILS NFR BLD AUTO: 4.25 10*3/MM3 (ref 1.7–7)
NEUTROPHILS NFR BLD AUTO: 63.5 % (ref 42.7–76)
NRBC BLD AUTO-RTO: 0 /100 WBC (ref 0–0.2)
PLATELET # BLD AUTO: 310 10*3/MM3 (ref 140–450)
PMV BLD AUTO: 10.4 FL (ref 6–12)
POTASSIUM SERPL-SCNC: 4 MMOL/L (ref 3.5–5.2)
PROT SERPL-MCNC: 7.2 G/DL (ref 6–8.5)
RBC # BLD AUTO: 3.13 10*6/MM3 (ref 4.14–5.8)
SODIUM SERPL-SCNC: 142 MMOL/L (ref 136–145)
WBC NRBC COR # BLD: 6.7 10*3/MM3 (ref 3.4–10.8)

## 2022-11-15 PROCEDURE — 82962 GLUCOSE BLOOD TEST: CPT

## 2022-11-15 PROCEDURE — 97535 SELF CARE MNGMENT TRAINING: CPT

## 2022-11-15 PROCEDURE — 97110 THERAPEUTIC EXERCISES: CPT

## 2022-11-15 PROCEDURE — 85025 COMPLETE CBC W/AUTO DIFF WBC: CPT | Performed by: INTERNAL MEDICINE

## 2022-11-15 PROCEDURE — 80053 COMPREHEN METABOLIC PANEL: CPT | Performed by: INTERNAL MEDICINE

## 2022-11-15 PROCEDURE — 25010000002 HEPARIN (PORCINE) PER 1000 UNITS: Performed by: ORTHOPAEDIC SURGERY

## 2022-11-15 PROCEDURE — 25010000002 VANCOMYCIN PER 500 MG

## 2022-11-15 PROCEDURE — 99232 SBSQ HOSP IP/OBS MODERATE 35: CPT | Performed by: NURSE PRACTITIONER

## 2022-11-15 PROCEDURE — 25010000002 CEFTRIAXONE PER 250 MG

## 2022-11-15 PROCEDURE — 99024 POSTOP FOLLOW-UP VISIT: CPT | Performed by: ORTHOPAEDIC SURGERY

## 2022-11-15 RX ORDER — GABAPENTIN 300 MG/1
300 CAPSULE ORAL EVERY 8 HOURS SCHEDULED
Status: DISCONTINUED | OUTPATIENT
Start: 2022-11-15 | End: 2022-11-16 | Stop reason: HOSPADM

## 2022-11-15 RX ADMIN — VANCOMYCIN HYDROCHLORIDE 750 MG: 750 INJECTION, SOLUTION INTRAVENOUS at 05:07

## 2022-11-15 RX ADMIN — Medication 10 ML: at 09:22

## 2022-11-15 RX ADMIN — GLIPIZIDE 2.5 MG: 5 TABLET ORAL at 09:21

## 2022-11-15 RX ADMIN — LISINOPRIL 40 MG: 40 TABLET ORAL at 09:21

## 2022-11-15 RX ADMIN — FERROUS SULFATE TAB 325 MG (65 MG ELEMENTAL FE) 325 MG: 325 (65 FE) TAB at 09:21

## 2022-11-15 RX ADMIN — METFORMIN HYDROCHLORIDE 1000 MG: 1000 TABLET, FILM COATED ORAL at 09:21

## 2022-11-15 RX ADMIN — OLANZAPINE 5 MG: 5 TABLET, ORALLY DISINTEGRATING ORAL at 14:07

## 2022-11-15 RX ADMIN — NIFEDIPINE 30 MG: 30 TABLET, FILM COATED, EXTENDED RELEASE ORAL at 09:21

## 2022-11-15 RX ADMIN — VANCOMYCIN HYDROCHLORIDE 750 MG: 750 INJECTION, SOLUTION INTRAVENOUS at 14:08

## 2022-11-15 RX ADMIN — SODIUM CHLORIDE 2 G: 900 INJECTION INTRAVENOUS at 10:56

## 2022-11-15 RX ADMIN — Medication 10 ML: at 09:23

## 2022-11-15 RX ADMIN — HEPARIN SODIUM 5000 UNITS: 5000 INJECTION INTRAVENOUS; SUBCUTANEOUS at 14:07

## 2022-11-15 RX ADMIN — OLANZAPINE 5 MG: 5 TABLET, FILM COATED ORAL at 09:22

## 2022-11-15 NOTE — PLAN OF CARE
Problem: Adult Inpatient Plan of Care  Goal: Plan of Care Review  Recent Flowsheet Documentation  Taken 11/15/2022 1448 by Agnes Haas OT  Plan of Care Reviewed With: patient  Outcome Evaluation: Pt participates in therapy with good effort. Follows simple commands consistently. Mod A to reposition in bed for comfort and activity. BUE/LE ther ex completed to support self-care and transfer training. Set-up and cues UB bathing/grooming, Min A UB dressing. OT will continue to follow IP. Plan is SNF when medically ready.

## 2022-11-15 NOTE — PROGRESS NOTES
[facility] Medicine Services  PROGRESS NOTE    Patient Name: Omkar Nava  : 1955  MRN: [id number]    Date of Admission: 10/14/2022  Primary Care Physician: Provider, No Known    Subjective   Subjective     CC:  F/u foot infection    HPI:  Patient resting in bed. Says he feels okay. Sutures were removed by Dr. Petty today. Still waiting on placement. Per RN, patient still having intermittent agitation, seems to do worse with Geodon. Will trial scheduled gapapentin.     ROS:  Gen- No fevers, chills  CV- No chest pain, palpitations  Resp- No cough, dyspnea  GI- No N/V/D, abd pain    Objective   Objective     Vital Signs:   Temp:  [97.8 °F (36.6 °C)-98.6 °F (37 °C)] 98.5 °F (36.9 °C)  Heart Rate:  [73-81] 73  Resp:  [17-18] 18  BP: (134-168)/(62-75) 166/73     Physical Exam:  Constitutional: No acute distress, awake, alert  HENT: NCAT, mucous membranes moist  Respiratory: Clear to auscultation bilaterally, respiratory effort normal, room air  Cardiovascular: RRR, no murmurs, rubs, or gallops  Gastrointestinal: Positive bowel sounds, soft, nontender, nondistended  Musculoskeletal: No bilateral ankle edema, left foot wrapped, boot  Psychiatric: Flat affect, cooperative  Neurologic: Oriented to self, strength symmetric BUE, Cranial Nerves grossly intact to confrontation, speech clear  Skin: No rashes, multiple abrasions BLE      Results Reviewed:  LAB RESULTS:      Lab 22  0627   WBC 6.95   HEMOGLOBIN 8.8*   HEMATOCRIT 29.4*   PLATELETS 352   MCV 93.6         Lab 22  0514 22  0749 22  0429 22  0627 11/10/22  0627   SODIUM 139 139 139 137 139   POTASSIUM 4.1 4.1 4.0 4.3 4.4   CHLORIDE 104 105 104 104 105   CO2 25.0 26.0 26.0 22.0 27.0   ANION GAP 10.0 8.0 9.0 11.0 7.0   BUN 24* 22 24* 25* 22   CREATININE 1.09 1.11 0.85 1.01 0.92   EGFR 75.3 73.7 96.4 82.5 92.3   GLUCOSE 137* 76 139* 138* 95   CALCIUM 9.1 9.4 9.3 9.5 9.1         Lab 22  0627   TOTAL  PROTEIN 7.3   ALBUMIN 3.10*   GLOBULIN 4.2   ALT (SGPT) 15   AST (SGOT) 17   BILIRUBIN <0.2   ALK PHOS 107                     Brief Urine Lab Results  (Last result in the past 365 days)      Color   Clarity   Blood   Leuk Est   Nitrite   Protein   CREAT   Urine HCG        06/12/22 2056 Yellow   Clear     Negative                     Microbiology Results Abnormal     Procedure Component Value - Date/Time    Fungus Culture - Tissue, Toe, Left [[id number]] Collected: 10/18/22 1608    Lab Status: Preliminary result Specimen: Tissue from Toe, Left Updated: 11/08/22 1715     Fungus Culture No fungus isolated at 3 weeks    AFB Culture - Tissue, Toe, Left [[id number]] Collected: 10/18/22 1608    Lab Status: Preliminary result Specimen: Tissue from Toe, Left Updated: 11/08/22 1715     AFB Culture No AFB isolated at 3 weeks     AFB Stain No acid fast bacilli seen on concentrated smear    Fungus Culture - Tissue, Toe, Left [[id number]] Collected: 10/18/22 1552    Lab Status: Preliminary result Specimen: Tissue from Toe, Left Updated: 11/08/22 1701     Fungus Culture No fungus isolated at 3 weeks    AFB Culture - Tissue, Toe, Left [[id number]] Collected: 10/18/22 1552    Lab Status: Preliminary result Specimen: Tissue from Toe, Left Updated: 11/08/22 1701     AFB Culture No AFB isolated at 3 weeks     AFB Stain No acid fast bacilli seen on concentrated smear    COVID PRE-OP / PRE-PROCEDURE SCREENING ORDER (NO ISOLATION) - Swab, Nasopharynx [[id number]]  (Normal) Collected: 10/27/22 0510    Lab Status: Final result Specimen: Swab from Nasopharynx Updated: 10/27/22 0530    Narrative:      The following orders were created for panel order COVID PRE-OP / PRE-PROCEDURE SCREENING ORDER (NO ISOLATION) - Swab, Nasopharynx.  Procedure                               Abnormality         Status                     ---------                               -----------         ------                     COVID-19 ABBOTT IN-HOUS...[[id number]]   Normal              Final result                 Please view results for these tests on the individual orders.    COVID-19, ABBOTT IN-HOUSE,NASAL Swab (NO TRANSPORT MEDIA) 2 HR TAT - Swab, Nasopharynx [[id number]]  (Normal) Collected: 10/27/22 0510    Lab Status: Final result Specimen: Swab from Nasopharynx Updated: 10/27/22 0530     COVID19 Presumptive Negative    Narrative:      Fact sheet for providers: https://www.fda.gov/media/009459/download     Fact sheet for patients: https://www.fda.gov/media/685070/download    Test performed by PCR.  If inconsistent with clinical signs and symptoms patient should be tested with different authorized molecular test.    Anaerobic Culture - Tissue, Toe, Left [[id number]]  (Normal) Collected: 10/18/22 1608    Lab Status: Final result Specimen: Tissue from Toe, Left Updated: 10/23/22 0543     Anaerobic Culture No anaerobes isolated at 5 days    Anaerobic Culture - Tissue, Toe, Left [[id number]]  (Normal) Collected: 10/18/22 1552    Lab Status: Final result Specimen: Tissue from Toe, Left Updated: 10/23/22 0543     Anaerobic Culture No anaerobes isolated at 5 days    Tissue / Bone Culture - Tissue, Toe, Left [[id number]] Collected: 10/18/22 1552    Lab Status: Final result Specimen: Tissue from Toe, Left Updated: 10/21/22 0840     Tissue Culture No growth at 3 days     Gram Stain Moderate (3+) WBCs seen      No organisms seen    Blood Culture - Blood, Hand, Left [[id number]]  (Normal) Collected: 10/14/22 2115    Lab Status: Final result Specimen: Blood from Hand, Left Updated: 10/19/22 2215     Blood Culture No growth at 5 days    Blood Culture - Blood, Arm, Left [[id number]]  (Normal) Collected: 10/14/22 2115    Lab Status: Final result Specimen: Blood from Arm, Left Updated: 10/19/22 2215     Blood Culture No growth at 5 days    MRSA Screen, PCR (Inpatient) - Swab, Nares [[id number]]  (Normal) Collected: 10/14/22 4625    Lab Status: Final result Specimen: Swab from Nares Updated:  10/15/22 0741     MRSA PCR Negative    Narrative:      The negative predictive value of this diagnostic test is high and should only be used to consider de-escalating anti-MRSA therapy. A positive result may indicate colonization with MRSA and must be correlated clinically.  MRSA Negative          No radiology results from the last 24 hrs        I have reviewed the medications:  Scheduled Meds:cefTRIAXone, 2 g, Intravenous, Q24H  ferrous sulfate, 325 mg, Oral, Daily With Breakfast  gabapentin, 300 mg, Oral, Q8H  glipizide, 2.5 mg, Oral, BID AC  heparin (porcine), 5,000 Units, Subcutaneous, Q8H  lisinopril, 40 mg, Oral, Daily  metFORMIN, 1,000 mg, Oral, BID With Meals  NIFEdipine XL, 30 mg, Oral, Q24H  OLANZapine, 5 mg, Oral, Daily   And  OLANZapine, 10 mg, Oral, Nightly  senna-docusate sodium, 2 tablet, Oral, BID  sodium chloride, 10 mL, Intravenous, Q12H  vancomycin, 750 mg, Intravenous, Q12H      Continuous Infusions:   PRN Meds:.•  acetaminophen **OR** [DISCONTINUED] acetaminophen  •  bisacodyl  •  dextrose  •  dextrose  •  glucagon (human recombinant)  •  magnesium sulfate **OR** magnesium sulfate in D5W 1g/100mL (PREMIX) **OR** magnesium sulfate  •  melatonin  •  [] HYDROmorphone **AND** naloxone  •  OLANZapine zydis  •  polyethylene glycol  •  Insert peripheral IV **AND** sodium chloride    Assessment & Plan   Assessment & Plan     Active Hospital Problems    Diagnosis  POA   • **Acute osteomyelitis of left foot (HCC) [M86.172]  Yes   • Neurocognitive disorder [R41.9]  Unknown   • Type 2 diabetes mellitus (HCC) [E11.9]  Yes   • Essential hypertension [I10]  Yes   • Psychotic disorder (HCC) [F29]  Yes      Resolved Hospital Problems   No resolved problems to display.        Brief Hospital Course to date:  Omkar Nava is a 67 y.o. male    Collected: 10/18/22 3012      Lab Status: Preliminary result Specimen: Tissue from Toe, Left Updated: 22 4374       Fungus Culture No fungus isolated at 3 weeks      AFB Culture - Tissue, Toe, Left [[id number]] Collected: 10/18/22 1552     Lab Status: Preliminary result Specimen: Tissue from Toe, Left Updated: 11/08/22 1701       AFB Culture No AFB isolated at 3 weeks       AFB Stain No acid fast bacilli seen on concentrated smear     COVID PRE-OP / PRE-PROCEDURE SCREENING ORDER (NO ISOLATION) - Swab, Nasopharynx [[id number]]  (Normal) Collected: 10/27/22 0510     Lab Status: Final result Specimen: Swab from Nasopharynx Updated: 10/27/22 0530     Narrative:       The following orders were created for panel order COVID PRE-OP / PRE-PROCEDURE SCREENING ORDER (NO ISOLATION) - Swab, Nasopharynx.  Procedure                               Abnormality         Status                     ---------                               -----------         ------                     COVID-19, ABBOTT IN-HOUS...[[id number]]  Normal              Final result                  Please view results for these tests on the individual orders.     COVID-19, ABBOTT IN-HOUSE,NASAL Swab (NO TRANSPORT MEDIA) 2 HR TAT - Swab, Nasopharynx [[id number]]  (Normal) Collected: 10/27/22 0510     Lab Status: Final result Specimen: Swab from Nasopharynx Updated: 10/27/22 0530       COVID19 Presumptive Negative     Narrative:       Fact sheet for providers: https://www.fda.gov/media/751375/download      Fact sheet for patients: https://www.fda.gov/media/923036/download     Test performed by PCR.  If inconsistent with clinical signs and symptoms patient should be tested with different authorized molecular test.     Anaerobic Culture - Tissue, Toe, Left [[id number]]  (Normal) Collected: 10/18/22 1608     Lab Status: Final result Specimen: Tissue from Toe, Left Updated: 10/23/22 0543       Anaerobic Culture No anaerobes isolated at 5 days     Anaerobic Culture - Tissue, Toe, Left [[id number]]  (Normal) Collected: 10/18/22 1552     Lab Status: Final result Specimen: Tissue from Toe, Left Updated: 10/23/22 0543       Anaerobic  Culture No anaerobes isolated at 5 days     Tissue / Bone Culture - Tissue, Toe, Left [[id number]] Collected: 10/18/22 1552     Lab Status: Final result Specimen: Tissue from Toe, Left Updated: 10/21/22 0840       Tissue Culture No growth at 3 days       Gram Stain Moderate (3+) WBCs seen         No organisms seen     Blood Culture - Blood, Hand, Left [[id number]]  (Normal) Collected: 10/14/22 2115     Lab Status: Final result Specimen: Blood from Hand, Left Updated: 10/19/22 2215       Blood Culture No growth at 5 days     Blood Culture - Blood, Arm, Left [[id number]]  (Normal) Collected: 10/14/22 2115     Lab Status: Final result Specimen: Blood from Arm, Left Updated: 10/19/22 2215       Blood Culture No growth at 5 days     MRSA Screen, PCR (Inpatient) - Swab, Nares [[id number]]  (Normal) Collected: 10/14/22 2345     Lab Status: Final result Specimen: Swab from Nares Updated: 10/15/22 0741       MRSA PCR Negative     Narrative:       The negative predictive value of this diagnostic test is high and should only be used to consider de-escalating anti-MRSA therapy. A positive result may indicate colonization with MRSA and must be correlated clinically.  MRSA Negative             Radiology results from the last 24 hours:   No radiology results from the last 24 hrs           I have reviewed the medications:  Scheduled Meds:cefTRIAXone, 2 g, Intravenous, Q24H  ferrous sulfate, 325 mg, Oral, Daily With Breakfast  glipizide, 2.5 mg, Oral, BID AC  heparin (porcine), 5,000 Units, Subcutaneous, Q8H  lisinopril, 40 mg, Oral, Daily  metFORMIN, 1,000 mg, Oral, BID With Meals  NIFEdipine XL, 30 mg, Oral, Q24H  OLANZapine, 5 mg, Oral, Daily   And  OLANZapine, 10 mg, Oral, Nightly  senna-docusate sodium, 2 tablet, Oral, BID  sodium chloride, 10 mL, Intravenous, Q12H  vancomycin, 750 mg, Intravenous, Q12H        Continuous Infusions:   PRN Meds:.•  acetaminophen **OR** [DISCONTINUED] acetaminophen  •  bisacodyl  •  dextrose  •   dextrose  •  glucagon (human recombinant)  •  magnesium sulfate **OR** magnesium sulfate in D5W 1g/100mL (PREMIX) **OR** magnesium sulfate  •  melatonin  •  [] HYDROmorphone **AND** naloxone  •  OLANZapine zydis  •  polyethylene glycol  •  Insert peripheral IV **AND** sodium chloride  •  ziprasidone        Assessment & Plan     Assessment & Plan            Active Hospital Problems     Diagnosis   POA   • **Acute osteomyelitis of left foot (HCC) [M86.172]   Yes   • Neurocognitive disorder [R41.9]   Unknown   • Type 2 diabetes mellitus (HCC) [E11.9]   Yes   • Essential hypertension [I10]   Yes   • Psychotic disorder (HCC) [F29]   Yes       Resolved Hospital Problems   No resolved problems to display.         Brief Hospital Course to date:  Omkar Nava is a 67 y.o. male with PMH significant for HTN, DMII and unspecified psychotic disorder. He previously worked as a  but has been unable to work for past 2-3 years and has become homeless.      His health declined in 2022 with an acute psychotic event with subsequent psychiatric hospitalization at [facility] in [facility] from  to 2022. He was subsequently transferred to a nursing facility in [place]. On 22, daughter visited him at the nursing home and he had a significant injury to his L great toe, causing an open articular fracture. He was admitted to [facility] and treated at that time with bedside flushing and repair by podiatry. He was given Ancef and sent back to nursing facility on Keflex. Wound cultures at that time grew MRSA, however blood culture showed no growth. On 2022, he presented to [facility] in [place] due to worsening foot wound and drainage. He underwent L foot I&D and debridement on 22. A PICC was placed on 10/3/22. He was to receive Daptomycin but due to expense, this was changed to Rifampin and Linezolid and PICC was removed. He was discharged to SNF  in [facility] on 10/6/22 and at some point, was transferred to [place], in order to be closer to family. Daughter visited him on 10/14/22 and became concerned about the appearance of his toe. She brought him to [facility] ED for further evaluation. ID and orthopedic surgery are following.      This patient's problems and plans were partially entered by my partner and updated as appropriate by me 11/15/22.    Osteomyelitis of left foot / L first toe (s/p amputation)  Wound culture (+) Raoultella ornithinolytica   - 10/15/22 MRI of L foot showed wound around the great toe with geographic marrow signal alteration within the distal aspect of the first metatarsal as well as both phalanges of the great toe indicative of osteomyelitis  - He is s/p L great toe amputation on 10/18/22 by Dr. Petty  - Sutures removed by Dr. Petty 11/15.  - Now heel weight-bearing as tolerated, offloading shoe to LLE  - Elevate operative extremity  - Continue DVT prophylax ASA 325mg daily x 30 days, should finish 11/18  - Per Dr. Petty, brianna to [place] when arrangements are complete. Will need to f/u in 2 weeks for wound check.  -ID, Dr. Gordon following. Continue IV ceftriaxone and IV vanc for 6 weeks via PICC until 11/15/22     Iron-deficiency anemia   - Completed 3 days IV iron on 10/20/22  - hemoglobin stable  - on oral ferrous sulfate daily      Non-insulin dependent DMII  - Hgb A1c 7.0%   - Continue Metformin 1000mg BID, add Glipizide 2.5mg BID   - Brianna to manny accuchecks as patient is on PO antihyperglycemics.   - Glucose appears well controlled.     Hypertension  - discontinued Norvasc  - on lisinopril  - on Procardia XL.  BP stable.     Neurocognitive disorder  Dementia   - Re-started olanzapine 10/15/22, was recently hospitalized at [facility] for psychiatric issues; apparently PRN Haldol was not much help, have discontinued  - Continue Olanzapine 5mg QAM and 10mg QHS, may need to titrate  - Overall stable  -Trial of  gabapentin 300 mg TID to help with agitation.   -Changed labs to q Monday CMP, CBC to minimize lab draws as labs have been stable. Also dc'd accuchecks as above.     Expected Discharge Location and Transportation: rehab with goal to transition to LTC. Private vehicle vs WC van   Expected Discharge Date: 11/16, when accepted by facility      DVT prophylaxis:  Medical and mechanical DVT prophylaxis orders are present.     AM-PAC 6 Clicks Score (PT): 13 (11/15/22 0800)    CODE STATUS:   Code Status and Medical Interventions:   Ordered at: 10/14/22 2230     Level Of Support Discussed With:    Patient     Code Status (Patient has no pulse and is not breathing):    CPR (Attempt to Resuscitate)     Medical Interventions (Patient has pulse or is breathing):    Full Support       Kari Teague, APRN  11/15/22

## 2022-11-15 NOTE — PLAN OF CARE
Goal Outcome Evaluation:  Plan of Care Reviewed With: patient        Progress: no change    Pt continues to become more aggitated mid afternoon refusing medications, trying to climb out of bed , incontinent of B/B . Pt did  participate with OT today bathed self with ROM exercises but very distrustful of nursing staff . Will continue to monitor

## 2022-11-15 NOTE — CASE MANAGEMENT/SOCIAL WORK
Continued Stay Note  [facility]     Patient Name: Omkar Nava  MRN: [id number]  Today's Date: 11/15/2022    Admit Date: 10/14/2022    Plan: [facility]   Discharge Plan     Row Name 11/15/22 1538       Plan    Plan [facility]    Patient/Family in Agreement with Plan yes    Plan Comments Continue to await a return phone call from Teresa, admissions liaison with [facility], to ensure all financial documents have been received. I have again left a message with her today. Case management will continue to follow.    Final Discharge Disposition Code 03 - skilled nursing facility (SNF)               Discharge Codes    No documentation.               Expected Discharge Date and Time     Expected Discharge Date Expected Discharge Time    Nov 18, 2022             Josep Sweeney RN

## 2022-11-15 NOTE — PROGRESS NOTES
Clinical Nutrition     Patient Name: Omkar Nava  YOB: 1955  MRN: [id number]  Date of Encounter: 11/15/22 13:31 EST  Admission date: 10/14/2022    Reason for Visit   LOS    EMR reviewed    Yes    Diet Nutrition Related History     Patient reports a good appetite and his intake is 90% over the last 5 meals. Patient is drinking his Boost GC, TID. Patient denies any known food allergies and denies any issues with chew/swallow. Patient states no current issues with N/V/D. Last documented BM was yesterday, 22.  Per patient, he is scheduled to D/C today.    Current Nutrition Prescription    Diet Regular Texture (IDDSI 7); Regular Consistency; Regular/House Diet  Orders Placed This Encounter      Dietary Nutrition Supplements Boost Glucose Control      Average Intake from [place]% x 5 meals    Actions:    Follow treatment progress, Care plan reviewed, Interview for preferences, Menu provided, Encourage intake    Monitor Per Protocol    Lindsay Bang,   Time Spent: 20 minutes

## 2022-11-15 NOTE — PROGRESS NOTES
[facility] Medicine Services  PROGRESS NOTE    Patient Name: Omkar Nava  : 1955  MRN: [id number]    Date of Admission: 10/14/2022  Primary Care Physician: Provider, No Known    Subjective   Subjective     CC:  Presented due to concerns about foot infection     HPI:  Feels OK today.  Friend at bedside says Mr Nava's mentation is back to baseline.    ROS:  Gen- No fevers, chills  CV- No chest pain, palpitations  Resp- No cough, dyspnea  GI- No N/V/D, abd pain           Objective   Objective     Vital Signs:   Temp:  [97.7 °F (36.5 °C)-98.4 °F (36.9 °C)] 98.4 °F (36.9 °C)  Heart Rate:  [66-73] 73  Resp:  [18] 18  BP: (131-164)/(59-76) 164/76     Physical Exam:  Constitutional - appears fatigued, in bed  HEENT-NCAT, mucous membranes moist  CV-RRR  Resp-CTAB  Abd-soft, nontender, nondistended, normoactive bowel sounds  Ext-left foot in wraps and a boot  Neuro-alert, speech clear   Psych-slightly flat affect   Skin- No rash on exposed UE or LE bilaterally      Results Reviewed:  LAB RESULTS:      Lab 22  0627 22  0503   WBC 6.95 8.71   HEMOGLOBIN 8.8* 8.8*   HEMATOCRIT 29.4* 32.7*   PLATELETS 352 410   NEUTROS ABS  --  6.49   IMMATURE GRANS (ABS)  --  0.04   LYMPHS ABS  --  1.35   MONOS ABS  --  0.57   EOS ABS  --  0.20   MCV 93.6 103.8*         Lab 22  0514 22  0749 22  0429 22  0627 11/10/22  0627   SODIUM 139 139 139 137 139   POTASSIUM 4.1 4.1 4.0 4.3 4.4   CHLORIDE 104 105 104 104 105   CO2 25.0 26.0 26.0 22.0 27.0   ANION GAP 10.0 8.0 9.0 11.0 7.0   BUN 24* 22 24* 25* 22   CREATININE 1.09 1.11 0.85 1.01 0.92   EGFR 75.3 73.7 96.4 82.5 92.3   GLUCOSE 137* 76 139* 138* 95   CALCIUM 9.1 9.4 9.3 9.5 9.1         Lab 22  0627 22  1012   TOTAL PROTEIN 7.3 7.1   ALBUMIN 3.10* 3.40*   GLOBULIN 4.2 3.7   ALT (SGPT) 15 20   AST (SGOT) 17 14   BILIRUBIN <0.2 <0.2   ALK PHOS 107 111                     Brief Urine Lab Results  (Last result  in the past 365 days)      Color   Clarity   Blood   Leuk Est   Nitrite   Protein   CREAT   Urine HCG        06/12/22 2056 Yellow   Clear     Negative                     Microbiology Results Abnormal     Procedure Component Value - Date/Time    Fungus Culture - Tissue, Toe, Left [[id number]] Collected: 10/18/22 1608    Lab Status: Preliminary result Specimen: Tissue from Toe, Left Updated: 11/08/22 1715     Fungus Culture No fungus isolated at 3 weeks    AFB Culture - Tissue, Toe, Left [[id number]] Collected: 10/18/22 1608    Lab Status: Preliminary result Specimen: Tissue from Toe, Left Updated: 11/08/22 1715     AFB Culture No AFB isolated at 3 weeks     AFB Stain No acid fast bacilli seen on concentrated smear    Fungus Culture - Tissue, Toe, Left [[id number]] Collected: 10/18/22 1552    Lab Status: Preliminary result Specimen: Tissue from Toe, Left Updated: 11/08/22 1701     Fungus Culture No fungus isolated at 3 weeks    AFB Culture - Tissue, Toe, Left [[id number]] Collected: 10/18/22 1552    Lab Status: Preliminary result Specimen: Tissue from Toe, Left Updated: 11/08/22 1701     AFB Culture No AFB isolated at 3 weeks     AFB Stain No acid fast bacilli seen on concentrated smear    COVID PRE-OP / PRE-PROCEDURE SCREENING ORDER (NO ISOLATION) - Swab, Nasopharynx [[id number]]  (Normal) Collected: 10/27/22 0510    Lab Status: Final result Specimen: Swab from Nasopharynx Updated: 10/27/22 0530    Narrative:      The following orders were created for panel order COVID PRE-OP / PRE-PROCEDURE SCREENING ORDER (NO ISOLATION) - Swab, Nasopharynx.  Procedure                               Abnormality         Status                     ---------                               -----------         ------                     COVID-19, ABBOTT IN-HOUS...[[id number]]  Normal              Final result                 Please view results for these tests on the individual orders.    COVID-19, ABBOTT IN-HOUSE,NASAL Swab (NO TRANSPORT  MEDIA) 2 HR TAT - Swab, Nasopharynx [[id number]]  (Normal) Collected: 10/27/22 0510    Lab Status: Final result Specimen: Swab from Nasopharynx Updated: 10/27/22 0530     COVID19 Presumptive Negative    Narrative:      Fact sheet for providers: https://www.fda.gov/media/072095/download     Fact sheet for patients: https://www.fda.gov/media/453880/download    Test performed by PCR.  If inconsistent with clinical signs and symptoms patient should be tested with different authorized molecular test.    Anaerobic Culture - Tissue, Toe, Left [[id number]]  (Normal) Collected: 10/18/22 1608    Lab Status: Final result Specimen: Tissue from Toe, Left Updated: 10/23/22 0543     Anaerobic Culture No anaerobes isolated at 5 days    Anaerobic Culture - Tissue, Toe, Left [[id number]]  (Normal) Collected: 10/18/22 1552    Lab Status: Final result Specimen: Tissue from Toe, Left Updated: 10/23/22 0543     Anaerobic Culture No anaerobes isolated at 5 days    Tissue / Bone Culture - Tissue, Toe, Left [[id number]] Collected: 10/18/22 1552    Lab Status: Final result Specimen: Tissue from Toe, Left Updated: 10/21/22 0840     Tissue Culture No growth at 3 days     Gram Stain Moderate (3+) WBCs seen      No organisms seen    Blood Culture - Blood, Hand, Left [[id number]]  (Normal) Collected: 10/14/22 2115    Lab Status: Final result Specimen: Blood from Hand, Left Updated: 10/19/22 2215     Blood Culture No growth at 5 days    Blood Culture - Blood, Arm, Left [[id number]]  (Normal) Collected: 10/14/22 2115    Lab Status: Final result Specimen: Blood from Arm, Left Updated: 10/19/22 2215     Blood Culture No growth at 5 days    MRSA Screen, PCR (Inpatient) - Swab, Nares [[id number]]  (Normal) Collected: 10/14/22 2345    Lab Status: Final result Specimen: Swab from Nares Updated: 10/15/22 0741     MRSA PCR Negative    Narrative:      The negative predictive value of this diagnostic test is high and should only be used to consider de-escalating  anti-MRSA therapy. A positive result may indicate colonization with MRSA and must be correlated clinically.  MRSA Negative          No radiology results from the last 24 hrs        I have reviewed the medications:  Scheduled Meds:cefTRIAXone, 2 g, Intravenous, Q24H  ferrous sulfate, 325 mg, Oral, Daily With Breakfast  glipizide, 2.5 mg, Oral, BID AC  heparin (porcine), 5,000 Units, Subcutaneous, Q8H  lisinopril, 40 mg, Oral, Daily  metFORMIN, 1,000 mg, Oral, BID With Meals  NIFEdipine XL, 30 mg, Oral, Q24H  OLANZapine, 5 mg, Oral, Daily   And  OLANZapine, 10 mg, Oral, Nightly  senna-docusate sodium, 2 tablet, Oral, BID  sodium chloride, 10 mL, Intravenous, Q12H  vancomycin, 750 mg, Intravenous, Q12H      Continuous Infusions:   PRN Meds:.•  acetaminophen **OR** [DISCONTINUED] acetaminophen  •  bisacodyl  •  dextrose  •  dextrose  •  glucagon (human recombinant)  •  magnesium sulfate **OR** magnesium sulfate in D5W 1g/100mL (PREMIX) **OR** magnesium sulfate  •  melatonin  •  [] HYDROmorphone **AND** naloxone  •  OLANZapine zydis  •  polyethylene glycol  •  Insert peripheral IV **AND** sodium chloride  •  ziprasidone    Assessment & Plan   Assessment & Plan     Active Hospital Problems    Diagnosis  POA   • **Acute osteomyelitis of left foot (HCC) [M86.172]  Yes   • Neurocognitive disorder [R41.9]  Unknown   • Type 2 diabetes mellitus (HCC) [E11.9]  Yes   • Essential hypertension [I10]  Yes   • Psychotic disorder (HCC) [F29]  Yes      Resolved Hospital Problems   No resolved problems to display.        Brief Hospital Course to date:  Omkar Nava is a 67 y.o. male with PMH significant for HTN, DMII and unspecified psychotic disorder. He previously worked as a [employer] but has been unable to work for past 2-3 years and has become homeless.      His health declined in 2022 with an acute psychotic event with subsequent psychiatric hospitalization at [facility] in [facility] from  to  August 2022. He was subsequently transferred to a nursing facility in [place]. On 9/17/22, daughter visited him at the nursing home and he had a significant injury to his L great toe, causing an open articular fracture. He was admitted to [facility] and treated at that time with bedside flushing and repair by podiatry. He was given Ancef and sent back to nursing facility on Keflex. Wound cultures at that time grew MRSA, however blood culture showed no growth. On 9/27/2022, he presented to [facility] in [place] due to worsening foot wound and drainage. He underwent L foot I&D and debridement on 9/30/22. A PICC was placed on 10/3/22. He was to receive Daptomycin but due to expense, this was changed to Rifampin and Linezolid and PICC was removed. He was discharged to SNF in [facility] on 10/6/22 and at some point, was transferred to [place], in order to be closer to family. Daughter visited him on 10/14/22 and became concerned about the appearance of his toe. She brought him to [facility] ED for further evaluation. ID and orthopedic surgery follow.     Plan was partially entered by my partner and I have reviewed and updated as appropriate on 11/13/22        Osteomyelitis of left foot / L first toe (s/p amputation)  Wound culture (+) Raoultella ornithinolytica   - 10/15/22 MRI of L foot showed wound around the great toe with geographic marrow signal alteration within the distal aspect of the first metatarsal as well as both phalanges of the great toe indicative of osteomyelitis  - He is s/p L great toe amputation on 10/18/22 by Dr. Petty  - Re-evaluated by Dr. Petty on 11/7. Splint removed. Sutures remain in place. Heel-weightbearing on LLE in hard-soled shoe. Follow up with Dr. Petty in 1 week (~ 11/14/22)  - NWB to LLE. Elevated LLE. Keep splint clean and dry. SQ heparin for DVT PPX while inpatient. Discharge on ASA 325mg daily x 30 days  - Right Arm PICC line with IV  abx - Infectious Diseases - Dr. Gordon  - infectious diseases following     Iron-deficiency anemia   - Completed 3 days IV iron on 10/20/22  - hemoglobin is trending up  - on oral ferrous sulfate daily currently     Non-insulin dependent DMII  - Hgb A1c 7.0%   - Continue Metformin 1000mg BID, add Glipizide 2.5mg BID   - Continue TID accuchecks   - glucose appears well controlled.  Continue current regimen and monitor.     Hypertension  - discontinued Norvasc  - on lisinopril  - on Procardia XL.  BP stable.     Neurocognitive disorder  Dementia   - Re-started olanzapine 10/15/22, was recently hospitalized at [facility] for psychiatric issues; apparently PRN Haldol was not much help, have discontinued  - Continue Olanzapine 5mg QAM and 10mg QHS, may need to titrate  - Overall stable    Plan: Dr. Petty will need to be contacted to see patient in hospital for wound check and suture removal prior to dc to rehab     Expected Discharge Location and Transportation: rehab with goal to transition to LTC. Private vehicle vs WC van   Expected Discharge Date: 11/15    DVT prophylaxis:  Medical and mechanical DVT prophylaxis orders are present.     AM-PAC 6 Clicks Score (PT): 13 (11/14/22 7516)    CODE STATUS:   Code Status and Medical Interventions:   Ordered at: 10/14/22 7848     Level Of Support Discussed With:    Patient     Code Status (Patient has no pulse and is not breathing):    CPR (Attempt to Resuscitate)     Medical Interventions (Patient has pulse or is breathing):    Full Support       Rusty Larsen MD  11/14/22

## 2022-11-15 NOTE — THERAPY TREATMENT NOTE
Patient Name: Omkar Nava  : 1955    MRN: [id number]                              Today's Date: 11/15/2022       Admit Date: 10/14/2022    Visit Dx:     ICD-10-CM ICD-9-CM   1. Osteomyelitis of toe of left foot (HCC)  M86.9 730.27   2. Anemia, unspecified type  D64.9 285.9   3. Hyperglycemia  R73.9 790.29   4. 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, without long-term current use of insulin   • Orthostatic hypotension   • Acute osteomyelitis of left foot (HCC)   • Type 2 diabetes mellitus (HCC)   • Essential hypertension   • Psychotic disorder (HCC)   • Neurocognitive disorder     Past Medical History:   Diagnosis Date   • Diabetes mellitus (HCC)    • GERD (gastroesophageal reflux disease)      Past Surgical History:   Procedure Laterality Date   • AMPUTATION FOOT Left 10/18/2022    Procedure: PARTIAL FIRST RAY AMPUTATION LEFT;  Surgeon: Yeison Petty MD;  Location: [facility];  Service: Orthopedics;  Laterality: Left;   • EYE SURGERY        General Information     Row Name 11/15/22 1457 11/15/22 1436       OT Time and Intention    Document Type therapy note (daily note)  -TB therapy note (daily note)  -TB    Mode of Treatment -- occupational therapy;individual therapy  -TB    Row Name 11/15/22 143          General Information    Patient Profile Reviewed yes  -TB     Existing Precautions/Restrictions fall;weight bearing;other (see comments)  L heel WBAT with forefoot offloading shoe on, R foot with cast shoe, dementia, blindness  -TB     Barriers to Rehab medically complex;cognitive status;visual deficit  -TB     Row Name 11/15/22 1436          Occupational Profile    Reason for Services/Referral (Occupational Profile) Occupational decline  -TB     Row Name 11/15/22 1430           Cognition    Orientation Status (Cognition) oriented to;person;verbal cues/prompts needed for orientation;place;situation  -TB     Row Name 11/15/22 1436          Safety Issues, Functional Mobility    Safety Issues Affecting Function (Mobility) insight into deficits/self-awareness;awareness of need for assistance;safety precaution awareness;safety precautions follow-through/compliance;sequencing abilities;judgment;problem-solving  -TB     Impairments Affecting Function (Mobility) cognition;balance;postural/trunk control;strength;visual/perceptual  -TB     Cognitive Impairments, Mobility Safety/Performance awareness, need for assistance;insight into deficits/self-awareness;judgment;problem-solving/reasoning;safety precaution awareness;safety precaution follow-through;sequencing abilities  -TB     Comment, Safety Issues/Impairments (Mobility) RN declined OOB  -TB           User Key  (r) = Recorded By, (t) = Taken By, (c) = Cosigned By    Initials Name Provider Type    TB Agnes Haas, OT Occupational Therapist                 Mobility/ADL's     Row Name 11/15/22 1438          Bed Mobility    Bed Mobility rolling left;rolling right;scooting/bridging  -TB     Rolling Left Maury (Bed Mobility) moderate assist (50% patient effort);1 person assist;verbal cues  -TB     Rolling Right Maury (Bed Mobility) moderate assist (50% patient effort);1 person assist;verbal cues  -TB     Scooting/Bridging Maury (Bed Mobility) minimum assist (75% patient effort);1 person assist;verbal cues  -TB     Row Name 11/15/22 1438          Transfers    Comment, (Transfers) RN declined OOB  -TB     Row Name 11/15/22 1438          Activities of Daily Living    BADL Assessment/Intervention bathing;upper body dressing;grooming  -TB     Row Name 11/15/22 1438          Mobility    Extremity Weight-bearing Status left lower extremity  -TB     Left Lower Extremity (Weight-bearing Status) weight-bearing as tolerated (WBAT)   L heel WBAT with forefoot offloading shoe on  -TB     Row Name 11/15/22 1438          Upper Body Dressing Assessment/Training    [place] Level (Upper Body Dressing) doff;don;pajama/robe;minimum assist (75% patient effort);verbal cues  -TB     Position (Upper Body Dressing) sitting up in bed  -TB     Comment, (Upper Body Dressing) Assist to doff soiled gown and don clean. Requires simple verbal/tactile cues due to visual deficits  -     Row Name 11/15/22 1438          Grooming Assessment/Training    [place] Level (Grooming) set up;wash face, hands  -TB     Position (Grooming) sitting up in bed  -TB     Row Name 11/15/22 1438          Bathing Assessment/Intervention    [place] Level (Bathing) set up;chest/trunk;upper extremities;bathing skills;verbal cues  B axilla  -TB     Position (Bathing) sitting up in bed  -           User Key  (r) = Recorded By, (t) = Taken By, (c) = Cosigned By    Initials Name Provider Type    Agnes Mcdaniel OT Occupational Therapist               Obj/Interventions     [facility] Name 11/15/22 1445          Shoulder (Therapeutic Exercise)    Shoulder (Therapeutic Exercise) AROM (active range of motion)  -     Shoulder AROM (Therapeutic Exercise) bilateral;flexion;extension;aBduction;aDduction;10 repetitions;3 sets  Verbal/tactile cues to initiate each exercise  -     Row Name 11/15/22 1445          Elbow/Forearm (Therapeutic Exercise)    Elbow/Forearm (Therapeutic Exercise) AROM (active range of motion)  -     Elbow/Forearm AROM (Therapeutic Exercise) bilateral;flexion;extension;10 repetitions;3 sets  -[facility] Name 11/15/22 1445          Motor Skills    Therapeutic Exercise shoulder;elbow/forearm  BLE glute and quad sets, R ankle pumps AROM x10 reps. BLE SLR AAROM x10 reps  -           User Key  (r) = Recorded By, (t) = Taken By, (c) = Cosigned By    Initials Name Provider Type    Agnes Mcdaniel OT Occupational Therapist               Goals/Plan     No documentation.                Clinical Impression     Row Name 11/15/22 1448          Pain Assessment    Pretreatment Pain Rating 0/10 - no pain  -TB     Posttreatment Pain Rating 0/10 - no pain  -TB     Pre/Posttreatment Pain Comment Pt denies pain with activity  -TB     Pain Intervention(s) Repositioned  -TB     Row Name 11/15/22 1448          Plan of Care Review    Plan of Care Reviewed With patient  -TB     Outcome Evaluation Pt participates in therapy with good effort. Follows simple commands consistently. Mod A to reposition in bed for comfort and activity. BUE/LE ther ex completed to support self-care and transfer training. Set-up and cues UB bathing/grooming, Min A UB dressing. OT will continue to follow IP. Plan is SNF when medically ready.  -TB     Row Name 11/15/22 1448          Therapy Plan Review/Discharge Plan (OT)    Anticipated Discharge Disposition (OT) skilled nursing facility  -TB     Row Name 11/15/22 1448          Vital Signs    Pre Systolic BP Rehab --  RN cleared OT  -TB     O2 Delivery Pre Treatment room air  -TB     Pre Patient Position Supine  -TB     Intra Patient Position Sitting  -TB     Post Patient Position Sitting  -TB     Row Name 11/15/22 1448          Positioning and Restraints    Pre-Treatment Position in bed  -TB     Post Treatment Position bed  -TB     In Bed notified nsg;fowlers;call light within reach;encouraged to call for assist;exit alarm on  -TB           User Key  (r) = Recorded By, (t) = Taken By, (c) = Cosigned By    Initials Name Provider Type    TB Agnes Haas, OT Occupational Therapist               Outcome Measures     Row Name 11/15/22 1458          How much help from another is currently needed...    Putting on and taking off regular lower body clothing? 2  -TB     Bathing (including washing, rinsing, and drying) 2  -TB     Toileting (which includes using toilet bed pan or urinal) 1  -TB     Putting on and taking off regular upper body clothing 3   -TB     Taking care of personal grooming (such as brushing teeth) 3  -TB     Eating meals 3  -TB     AM-PAC 6 Clicks Score (OT) 14  -TB     Row Name 11/15/22 0800          How much help from another person do you currently need...    Turning from your back to your side while in flat bed without using bedrails? 3  -MM     Moving from lying on back to sitting on the side of a flat bed without bedrails? 3  -MM     Moving to and from a bed to a chair (including a wheelchair)? 2  -MM     Standing up from a chair using your arms (e.g., wheelchair, bedside chair)? 2  -MM     Climbing 3-5 steps with a railing? 1  -MM     To walk in hospital room? 2  -MM     AM-PAC 6 Clicks Score (PT) 13  -MM     Highest level of mobility 4 --> Transferred to chair/commode  -MM     Row Name 11/15/22 1458          Functional Assessment    Outcome Measure Options AM-PAC 6 Clicks Daily Activity (OT)  -TB           User Key  (r) = Recorded By, (t) = Taken By, (c) = Cosigned By    Initials Name Provider Type    TB Agnes Haas, OT Occupational Therapist    Erlinda Donaldson RN Registered Nurse                Occupational Therapy Education     Title: PT OT SLP Therapies (In Progress)     Topic: Occupational Therapy (In Progress)     Point: ADL training (In Progress)     Description:   Instruct learner(s) on proper safety adaptation and remediation techniques during self care or transfers.   Instruct in proper use of assistive devices.              Learning Progress Summary           Patient Acceptance, E, NR by TB at 11/15/2022 1459    Acceptance, E, NR by MR at 11/12/2022 1358    Acceptance, E,D, NR by SANDRA at 11/7/2022 0946    Acceptance, E,D, NR by SANDRA at 11/2/2022 1505    Acceptance, E, VU by PEG at 10/26/2022 2333    Acceptance, E, VU by PEG at 10/25/2022 2209    Acceptance, E,D, NR by SANDRA at 10/24/2022 1340    Acceptance, E, VU by PEG at 10/22/2022 2204    Acceptance, E,D, NR by SANDRA at 10/19/2022 1445   Family Acceptance, E,D, NR by SANDRA at  11/7/2022 0946                   Point: Home exercise program (In Progress)     Description:   Instruct learner(s) on appropriate technique for monitoring, assisting and/or progressing therapeutic exercises/activities.              Learning Progress Summary           Patient Acceptance, E, NR by TB at 11/15/2022 1459    Acceptance, E, NR by MR at 11/12/2022 1358    Acceptance, E,D, NR by JY at 11/7/2022 0946    Acceptance, E, VU by LS at 10/26/2022 2333    Acceptance, E, VU by LS at 10/25/2022 2209    Acceptance, E, VU by LS at 10/22/2022 2204   Family Acceptance, E,D, NR by JY at 11/7/2022 0946                   Point: Precautions (In Progress)     Description:   Instruct learner(s) on prescribed precautions during self-care and functional transfers.              Learning Progress Summary           Patient Acceptance, E, NR by MR at 11/12/2022 1358    Acceptance, E,D, NR by JY at 11/7/2022 0946    Acceptance, E,D, NR by JSARAH at 11/2/2022 1505    Acceptance, E, VU by LS at 10/26/2022 2333    Acceptance, E, VU by LS at 10/25/2022 2209    Acceptance, E,D, NR by JSARAH at 10/24/2022 1340    Acceptance, E, VU by LS at 10/22/2022 2204    Acceptance, E,D, NR by SANDRA at 10/19/2022 1445   Family Acceptance, E,D, NR by JY at 11/7/2022 0946                   Point: Body mechanics (In Progress)     Description:   Instruct learner(s) on proper positioning and spine alignment during self-care, functional mobility activities and/or exercises.              Learning Progress Summary           Patient Acceptance, E, NR by MR at 11/12/2022 1358    Acceptance, E,D, NR by JY at 11/7/2022 0946    Acceptance, E,D, NR by JY at 11/2/2022 1505    Acceptance, E, VU by LS at 10/26/2022 2333    Acceptance, E, VU by LS at 10/25/2022 2209    Acceptance, E,D, NR by JSARAH at 10/24/2022 1340    Acceptance, E, VU by LS at 10/22/2022 2204    Acceptance, E,D, NR by SANDRA at 10/19/2022 1445   Family Acceptance, ED, NR by SANDRA at 11/7/2022 0946                                User Key     Initials Effective Dates Name Provider Type Discipline    TB 06/16/21 -  Agnes Haas OT Occupational Therapist OT    JY 06/16/21 -  Jenniffer Lee, OT Occupational Therapist OT    MR 09/22/22 -  Sonia Laureano, OT Occupational Therapist OT    LS 09/22/22 -  Augusta Singleton RN Registered Nurse Nurse              OT Recommendation and Plan     Plan of Care Review  Plan of Care Reviewed With: patient  Outcome Evaluation: Pt participates in therapy with good effort. Follows simple commands consistently. Mod A to reposition in bed for comfort and activity. BUE/LE ther ex completed to support self-care and transfer training. Set-up and cues UB bathing/grooming, Min A UB dressing. OT will continue to follow IP. Plan is SNF when medically ready.     Time Calculation:    Time Calculation- OT     Row Name 11/15/22 1402             Time Calculation- OT    OT Start Time 1402  -TB      OT Received On 11/15/22  -TB      OT Goal Re-Cert Due Date 11/17/22  -TB         Timed Charges    60747 - OT Therapeutic Exercise Minutes 13  -TB      79607 - OT Self Care/Mgmt Minutes 15  -TB         Total Minutes    Timed Charges Total Minutes 28  -TB       Total Minutes 28  -TB            User Key  (r) = Recorded By, (t) = Taken By, (c) = Cosigned By    Initials Name Provider Type     Agnes Haas OT Occupational Therapist              Therapy Charges for Today     Code Description Service Date Service Provider Modifiers Qty    45569937401 HC OT THER PROC EA 15 MIN 11/15/2022 Agnes Haas OT GO 1    22336381269 HC OT SELF CARE/MGMT/TRAIN EA 15 MIN 11/15/2022 Agnes Haas OT GO 1               Agnes Haas OT  11/15/2022

## 2022-11-15 NOTE — PROGRESS NOTES
"          Orthopaedic Surgery Progress Note    LOS: 32 days   Patient Care Team:  Provider, No Known as PCP - General  POD 28 Days Post-Op   Procedure(s):  PARTIAL FIRST RAY AMPUTATION LEFT  Subjective   Interval History:   Laying in bed, answering questions and pleasant, denies pain, no new complaints    Objective     Vital Signs:  Temp (24hrs), Av.2 °F (36.8 °C), Min:97.8 °F (36.6 °C), Max:98.6 °F (37 °C)    /70 (BP Location: Left arm, Patient Position: Lying)   Pulse 73   Temp 97.8 °F (36.6 °C) (Oral)   Resp 18   Ht 182.9 cm (72\")   Wt 87 kg (191 lb 12.8 oz)   SpO2 97%   BMI 26.01 kg/m²     Labs:  Lab Results (last 24 hours)     Procedure Component Value Units Date/Time    POC Glucose Once [[id number]]  (Abnormal) Collected: 11/15/22 0745    Specimen: Blood Updated: 11/15/22 0746     Glucose 151 mg/dL      Comment: Meter: [id number] : [id number] Medardo Mukherjee       POC Glucose Once [[id number]]  (Normal) Collected: 22 1602    Specimen: Blood Updated: 22 1603     Glucose 90 mg/dL      Comment: Meter: [id number] : [id number] Lor Corley       Vancomycin, Trough [[id number]]  (Normal) Collected: 22 1342    Specimen: Blood Updated: 22 1505     Vancomycin Trough 16.30 mcg/mL     Narrative:      Therapeutic Ranges for Vancomycin    Vancomycin Random   5.0-40.0 mcg/mL  Vancomycin Trough   5.0-20.0 mcg/mL  Vancomycin Peak     20.0-40.0 mcg/mL          Physical Exam:  left LE: Leg compartments soft/compressible              Splint and dressing removed for exam              Incision clean dry and intact, sutures in place, no drainage, some dry necrotic tissue at skin edges at the incision distally, margin of necrosis stable and consistent with previous exam (see image in chart), no areas of dehiscence              Lesser toes are warm and well perfused, palpable DP pulse    Assessment & Plan    POD 28 Days Post-Op   Procedure(s):  PARTIAL FIRST RAY AMPUTATION LEFT    -Sutures " removed 11/15, incision CDI; incision looking good, plan to continue to monitor current necrotic tissue at skin edges acting as a biologic dressing, plan to continue to leave until it sloughs off  -Patient can be heel weightbearing as tolerated in forefoot offloading shoe on operative extremity  -DVT prophylaxis, mechanical and subq hep, rec DC home on  Daily (x30 days)  -Antibiotics per ID recommendations  -Pain control  -Elevate operative extremity  -Okay for DC from ortho standpoint, f/u in clinic in 2 weeks for wound check  -Rest of management per primary team    Yeison Petty MD  11/15/22  13:38 EST

## 2022-11-16 VITALS
OXYGEN SATURATION: 99 % | WEIGHT: 198.3 LBS | TEMPERATURE: 98.4 F | HEIGHT: 72 IN | DIASTOLIC BLOOD PRESSURE: 79 MMHG | SYSTOLIC BLOOD PRESSURE: 171 MMHG | RESPIRATION RATE: 18 BRPM | BODY MASS INDEX: 26.86 KG/M2 | HEART RATE: 82 BPM

## 2022-11-16 LAB — SARS-COV-2 RDRP RESP QL NAA+PROBE: NORMAL

## 2022-11-16 PROCEDURE — 25010000002 VANCOMYCIN PER 500 MG

## 2022-11-16 PROCEDURE — 99239 HOSP IP/OBS DSCHRG MGMT >30: CPT | Performed by: INTERNAL MEDICINE

## 2022-11-16 PROCEDURE — 87635 SARS-COV-2 COVID-19 AMP PRB: CPT | Performed by: INTERNAL MEDICINE

## 2022-11-16 PROCEDURE — 25010000002 HEPARIN (PORCINE) PER 1000 UNITS: Performed by: ORTHOPAEDIC SURGERY

## 2022-11-16 PROCEDURE — 25010000002 CEFTRIAXONE PER 250 MG

## 2022-11-16 RX ORDER — GLIPIZIDE 5 MG/1
2.5 TABLET ORAL DAILY
Start: 2022-11-16

## 2022-11-16 RX ORDER — NIFEDIPINE 30 MG/1
30 TABLET, FILM COATED, EXTENDED RELEASE ORAL
Status: ON HOLD
Start: 2022-11-17 | End: 2022-12-06

## 2022-11-16 RX ORDER — CHOLECALCIFEROL (VITAMIN D3) 125 MCG
5 CAPSULE ORAL NIGHTLY
Start: 2022-11-16

## 2022-11-16 RX ADMIN — GLIPIZIDE 2.5 MG: 5 TABLET ORAL at 17:04

## 2022-11-16 RX ADMIN — SENNOSIDES AND DOCUSATE SODIUM 2 TABLET: 50; 8.6 TABLET ORAL at 08:55

## 2022-11-16 RX ADMIN — Medication 10 ML: at 08:57

## 2022-11-16 RX ADMIN — HEPARIN SODIUM 5000 UNITS: 5000 INJECTION INTRAVENOUS; SUBCUTANEOUS at 03:25

## 2022-11-16 RX ADMIN — METFORMIN HYDROCHLORIDE 1000 MG: 1000 TABLET, FILM COATED ORAL at 17:04

## 2022-11-16 RX ADMIN — VANCOMYCIN HYDROCHLORIDE 750 MG: 750 INJECTION, SOLUTION INTRAVENOUS at 03:08

## 2022-11-16 RX ADMIN — OLANZAPINE 5 MG: 5 TABLET, FILM COATED ORAL at 08:57

## 2022-11-16 RX ADMIN — METFORMIN HYDROCHLORIDE 1000 MG: 1000 TABLET, FILM COATED ORAL at 08:55

## 2022-11-16 RX ADMIN — SODIUM CHLORIDE 2 G: 900 INJECTION INTRAVENOUS at 11:09

## 2022-11-16 RX ADMIN — FERROUS SULFATE TAB 325 MG (65 MG ELEMENTAL FE) 325 MG: 325 (65 FE) TAB at 08:56

## 2022-11-16 RX ADMIN — GABAPENTIN 300 MG: 300 CAPSULE ORAL at 13:58

## 2022-11-16 RX ADMIN — GLIPIZIDE 2.5 MG: 5 TABLET ORAL at 08:56

## 2022-11-16 RX ADMIN — HEPARIN SODIUM 5000 UNITS: 5000 INJECTION INTRAVENOUS; SUBCUTANEOUS at 13:58

## 2022-11-16 RX ADMIN — VANCOMYCIN HYDROCHLORIDE 750 MG: 750 INJECTION, SOLUTION INTRAVENOUS at 14:08

## 2022-11-16 RX ADMIN — Medication 10 ML: at 04:28

## 2022-11-16 RX ADMIN — GABAPENTIN 300 MG: 300 CAPSULE ORAL at 03:19

## 2022-11-16 RX ADMIN — NIFEDIPINE 30 MG: 30 TABLET, FILM COATED, EXTENDED RELEASE ORAL at 08:55

## 2022-11-16 RX ADMIN — LISINOPRIL 40 MG: 40 TABLET ORAL at 08:56

## 2022-11-16 NOTE — PROGRESS NOTES
INFECTIOUS DISEASE Progress Note    Omkar Nava  1955  [id number]    Consult: 10/15/22  Admit date: 10/14/2022    Requesting Provider: Omkar Seth MD  Evaluating physician:  Rayray Gordon MD  Reason for Consultation: left foot osteomyelitis, first toe, distal phalanx  Chief Complaint: left foot pain      Subjective   History of present illness:  Patient is a  67 y.o. male with h/o T2DM, PN, HTN, normocytic anemia, and GERD who presented to [facility] ED on 10/14 for worsening left foot pain.  The patient is a poor historian and most of the information was taken from the chart.  He tripped at his nursing facility, [facility], while running the street in flip-flops and sustained a laceration of his first webspace.  He was taken to [facility] ED on 9/17/22 and found to have a small intra-articular vertical fracture through the base the the great toe proximal phalanx. Podiatry, Dr. Ruiz, saw patient and the wound with irrigated with suture placement.  The area was dressed and he was placed in a surgical shoe.  He was given Cefazolin x 1 dose and discharged back to his facility on Keflex for 7 days.  On 9/26/22, the nursing staff at increased pain, swelling, and redness around the foot and sent him to [facility] ED on that day.  He was found to have a displaced fracture of proximal phalanx of left hallux along with abscess and gas in tissues.  He was admitted to the hospital and underwent Left foot debridement and I and D on 9/27/22 by Dr. Ruiz with culture positive for MRSA (Resistant to Cefazolin, clindamycin, erm, oxacillin, tetracycline, Bactrim and sensitive to Vancomycin, Daptomycin-KB 1, and Linezolid KB 2), Corynebacterium striatum, and Enterococcus faecalis (sens to Vanc and ampicillin).  He underwent a second debridement and I and D on 9/30.  He was given Cefazolin in ED and then changed to Daptomycin.  A PICC line was placed on 10/3 for Daptomycin infusions,  but the antibiotic was too expensive for the SNF, so he was changed to oral Zyvox and Rifampin until 10/26.  The PICC line was removed on 10/4.  His blood cultures remained negative.  He was discharged back to his nursing facility on 10/4/22.      He was sent to [facility] ED on 10/14 after nursing staff noted that his left hallux appeared necrotic.  He has had no fever, chills, shortness of breath, nausea, vomiting, diarrhea, dysuria, or worsening foot pain.  On arrival, he is afebrile and hemodynamically stable.  On 10/15, his BP went up to 203/81.  Admitting labs were WBC 7200 with 69% neutrophils, ESR 67, CRP 3.07, Hgb 9.2, PCT 0.05, lactic acid 2.3, and creatinine 1.21.  A MRSA PCR was negative.  Blood and wound cultures are pending.  An MRI of the left foot on 10/15 showed wound around the left great toe with marrow signal alteration within the distal aspect of 1st MT as well as both phalanges c/w osteomyelitis and scattered foci in soft tissues that may be foci of gas without evidence of fluid collections or abscesses. An Xray of left foot showed soft tissue swelling of 1st digit with displaced fracture of medial base of 1st proximal phalanx.  He is currently on Cefepime and Vancomycin.  ID was asked by Dr. Seth on 10/15 to evaluate and manage his antibiotic therapy.  Patient has had some issues with psychosis over the past 6 months possibly related to drug and alcohol use.  This is also interfered with his acceptance of care.    10/16/2022 history reviewed.  Patient more pleasant this morning.  No high fevers or chills.  Tolerating vancomycin and cefepime, duration to be determined.  Orthopedics following.  Previous history of MRSA.    10/17/22: history reviewed.  Patient with minimal response. Tmax 99.6.  On Vancomycin and Cefepime for left hallux osteomyelitis and surrounding infection with duration to be determined.  Awaiting evaluate by Dr. Petty as per Dr. Lieberman's note.  More cooperative.    10/18/2022 history  reviewed.  Patient awaiting a left hallux ray amputation by orthopedic surgery.  Tolerating vancomycin and cefepime.    10/19/2022 history reviewed.  The patient underwent a first toe ray resection on his left foot by Dr. Yeison Petty on 10/18/2022.  Continues on vancomycin and cefepime until 11/25/2022.  No high fevers or chills.    10/20/2022 history reviewed.  Status post left foot first ray toe resection on 10/18, with previous history of MRSA, and new infection with Raoultella ornithinolytica.  Tolerating vancomycin and ceftriaxone until 11/25/2022.  Waiting on placement.    10/24/2022 history reviewed.  No high fevers.  Status post left foot first ray toe resection on 10/18, with previous history of MRSA, and new infection with Raoultella ornithinolytica.  Continues on ceftriaxone and vancomycin until 11/25/2022.    10/25/2022 history reviewed.  Status post left foot first toe resection 10/18/2022 with cultures previously positive for MRSA, and now for Klebsiella and Raoultella.  Mental status has been a limiting factor with his psychiatric history in terms of treatment.  He is tolerating vancomycin and ceftriaxone to continue until 11/25/2022.  Placement is in progress.    10/26/2022 history reviewed.  Status post left first toe resection 10/18/2022 for osteomyelitis.  Tolerating ceftriaxone and vancomycin treating MRSA, Klebsiella, and other bacteria until 11/25/2022.  Awaiting placement, especially given his mental status and psychiatric history.  No high fever.  Pain controlled.    10/27/2022 history reviewed.  Continues on vancomycin and ceftriaxone until 11/25/2022 for left first toe osteomyelitis.  Status post resection 10/18/2022.  Awaiting placement.  No high fever.    10/31/2022 history reviewed.  Continues on antibiotics for left first toe osteomyelitis until 11/25.  Status post resection 10/18.  Confused off and on.  Awaiting placement.  No fever.    11/1/2022 history reviewed.  Tolerating  ceftriaxone and vancomycin until 11/25/2022 for left first toe osteomyelitis status post resection 10/18.  Pleasant but confused.  Awaiting placement.  No fever.  No pain.    11/2/2022 history reviewed.  On vancomycin and ceftriaxone until 11/25 for left toe osteomyelitis status post resection 10/18/2022.  Placement has been difficult as places or refusing him.  No fever.    11/3/2022 history reviewed.  Tolerating ceftriaxone and vancomycin until 11/25/2022 for left first toe osteomyelitis polymicrobial status post resection 10/18/2022.  Still a difficult placement issue.  No fever.    11/4/2022 history reviewed.  Continues on vancomycin and ceftriaxone until 11/25 for left first toe osteomyelitis polymicrobial, status post amputation and resection 10/18/2022.  More amendable to care.  No fever.  Waiting on placement.    11/7/2022 history reviewed.  Tolerating ceftriaxone and vancomycin until 11/25 for left first toe osteomyelitis status post resection 10/18.  No high fever.    11/8/2022 history reviewed.  Continues on antibiotics till 11/25 for left first toe osteomyelitis status post resection 10/18.  No fever.    11/9/2022 history reviewed.  Continues on vancomycin and ceftriaxone until 11/25 for left first toe osteomyelitis.  Pleasant but confused.  No fever.    11/10/2022 history reviewed.  Tolerating antibiotics until 11/25 for left first toe osteomyelitis.  No high fevers.  Having difficulties being placed for his IV antibiotics and wound care.  No pain.    11/11/2022 history reviewed.  Continues on vancomycin and ceftriaxone until 11/25 for left first toe osteomyelitis.  No fever.  Watching TV and comfortable.  Waiting on placement.    11/12/22 hx rev.  Angela ab till 11/25 for left foot OM.  No pain or fever.    11/14/2022 history reviewed.  Continues on antibiotics until 11/25 for left foot osteomyelitis and difficulty in placement for multiple reasons.  No high fever.  No pain.  Tolerating vancomycin and  ceftriaxone.    11/15/22 hx rev.  Cont on ab till 11/25 for left foot OM.  No pain or fever.  Pleasant.    11/16/2022 history reviewed.  Tolerating ceftriaxone and vancomycin until 11/25 for first toe osteomyelitis left foot.  No high fevers or chills.  Difficult placement.    Past Medical History:   Diagnosis Date   • Diabetes mellitus (HCC)    • GERD (gastroesophageal reflux disease)    HTN  Psychotic d/o, unspecified.    Past Surgical History:   Procedure Laterality Date   • AMPUTATION FOOT Left 10/18/2022    Procedure: PARTIAL FIRST RAY AMPUTATION LEFT;  Surgeon: Yeison Petty MD;  Location: [facility];  Service: Orthopedics;  Laterality: Left;   • EYE SURGERY     Left foot debridement/I and D x 2 9/27/22 and 9/30/22.  Left first ray amputation 10/18/2022.    Pediatric History   Patient Parents   • Not on file     Other Topics Concern   • Not on file   Social History Narrative    Caffeine 0-1 servings per day    Patient lives at home .   Patient lives at [facility]  Former smoker, no alcohol, smokes marijuana.     family history includes Diabetes in his brother, brother, father, maternal grandfather, maternal grandmother, mother, and sister; Hypertension in his brother, brother, father, and mother.    No Known Allergies      There is no immunization history on file for this patient.    Medication:    Current Facility-Administered Medications:   •  acetaminophen (TYLENOL) tablet 650 mg, 650 mg, Oral, Q4H PRN, 650 mg at 11/08/22 1942 **OR** [DISCONTINUED] acetaminophen (TYLENOL) suppository 650 mg, 650 mg, Rectal, Q4H PRN, Yeison Petty MD  •  bisacodyl (DULCOLAX) suppository 10 mg, 10 mg, Rectal, Daily PRN, Yeison Petty MD  •  cefTRIAXone (ROCEPHIN) 2 g/100 mL 0.9% NS IVPB (MBP), 2 g, Intravenous, Q24H, Hero Ty [facility], Last Rate: 200 mL/hr at 11/12/22 1103, 2 g at 11/15/22 1056  •  dextrose (D50W) (25 g/50 mL) IV injection 25 g, 25 g, Intravenous, Q15 Min PRN, Yeison Petty MD  •  dextrose  (GLUTOSE) oral gel 15 g, 15 g, Oral, Q15 Min PRN, Yeison Petty MD  •  ferrous sulfate tablet 325 mg, 325 mg, Oral, Daily With Breakfast, Yeison Petty MD, 325 mg at 22 0856  •  gabapentin (NEURONTIN) capsule 300 mg, 300 mg, Oral, Q8H, Kari Teague, APRN, 300 mg at 22 0319  •  glipizide (GLUCOTROL) tablet 2.5 mg, 2.5 mg, Oral, BID AC, Elena Ugalde PA-C, 2.5 mg at 22 0856  •  glucagon (human recombinant) (GLUCAGEN DIAGNOSTIC) injection 1 mg, 1 mg, Intramuscular, Q15 Min PRN, Yeison Petty MD  •  heparin (porcine) 5000 UNIT/ML injection 5,000 Units, 5,000 Units, Subcutaneous, Q8H, Yeison Petty MD, 5,000 Units at 22 0325  •  lisinopril (PRINIVIL,ZESTRIL) tablet 40 mg, 40 mg, Oral, Daily, Shahbaz Kuhn MD, 40 mg at 22 0856  •  magnesium sulfate 4 gram infusion - Mg less than or equal to 1mg/dL, 4 g, Intravenous, PRN **OR** magnesium sulfate 3 gram infusion (1gm x 3) - Mg 1.1 - 1.5 mg/dL, 1 g, Intravenous, PRN **OR** Magnesium Sulfate 2 gram infusion- Mg 1.6 - 1.9 mg/dL, 2 g, Intravenous, PRN, Yeison Petty MD  •  melatonin tablet 5 mg, 5 mg, Oral, Nightly PRN, Yeison Petty MD, 5 mg at 22 2040  •  metFORMIN (GLUCOPHAGE) tablet 1,000 mg, 1,000 mg, Oral, BID With Meals, Elena Ugalde PA-C, 1,000 mg at 22 0855  •  [] HYDROmorphone (DILAUDID) injection 0.5 mg, 0.5 mg, Intravenous, Q2H PRN, 0.5 mg at 10/24/22 1449 **AND** naloxone (NARCAN) injection 0.1 mg, 0.1 mg, Intravenous, Q5 Min PRN, Yeison Petty MD  •  NIFEdipine XL (PROCARDIA XL) 24 hr tablet 30 mg, 30 mg, Oral, Q24H, Elena Ugalde PA-C, 30 mg at 22 0855  •  OLANZapine (zyPREXA) tablet 5 mg, 5 mg, Oral, Daily, 5 mg at 22 0857 **AND** OLANZapine (zyPREXA) tablet 10 mg, 10 mg, Oral, Nightly, Nicole Henriquez DO, 10 mg at 22 2221  •  OLANZapine zydis (zyPREXA) disintegrating tablet 5 mg, 5 mg, Oral, Daily PRN, Elena Ugalde PA-C, 5 mg at 11/15/22  "1407  •  polyethylene glycol (MIRALAX) packet 17 g, 17 g, Oral, PRN, Yeison Petty MD, 17 g at 11/08/22 0802  •  sennosides-docusate (PERICOLACE) 8.6-50 MG per tablet 2 tablet, 2 tablet, Oral, BID, Elena Ugalde PA-C, 2 tablet at 11/16/22 0855  •  Insert peripheral IV, , , Once **AND** sodium chloride 0.9 % flush 10 mL, 10 mL, Intravenous, PRN, Yeison Petty MD, 10 mL at 11/16/22 0428  •  sodium chloride 0.9 % flush 10 mL, 10 mL, Intravenous, Q12H, Nicole Henriquez DO, 10 mL at 11/16/22 0857  •  vancomycin in dextrose 5% 150 mL (VANCOCIN) IVPB 750 mg, 750 mg, Intravenous, Q12H, Hero Ty [facility], Last Rate: 150 mL/hr at 11/12/22 1523, 750 mg at 11/16/22 0308    Please refer to the medical record for a full medication list    Review of Systems:  Unable to get a reliable history as patient is responding with yes and no to basic questions and has a psychotic disorder.  Denies fever, chills, nausea, vomiting, diarrhea, shortness of breath, dysuria, or rashes.    Physical Exam:   Vital Signs   Temp:  [97.8 °F (36.6 °C)-98.5 °F (36.9 °C)] 98.5 °F (36.9 °C)  Heart Rate:  [68-75] 68  Resp:  [18] 18  BP: (143-168)/(58-83) 168/83    Temp  Min: 97.8 °F (36.6 °C)  Max: 98.5 °F (36.9 °C)  BP  Min: 143/58  Max: 168/83  Pulse  Min: 68  Max: 75  Resp  Min: 18  Max: 18  SpO2  Min: 96 %  Max: 96 %    Blood pressure 168/83, pulse 68, temperature 98.5 °F (36.9 °C), temperature source Oral, resp. rate 18, height 182.9 cm (72\"), weight 89.9 kg (198 lb 4.8 oz), SpO2 96 %.  GENERAL: Awake and alert, in no acute distress. Appears older than stated age.  Resting in bed.    HEENT:  Normocephalic, atraumatic.  Oropharynx without thrush.   EYES: No conjunctival injection. No icterus. EOM full.  LYMPHATICS: No lymphadenopathy of the neck or axillary or inguinal regions.   HEART: No murmur, gallop, or pericardial friction rub. Reg rate rhythm.    LUNGS: Clear to auscultation and percussion. No respiratory distress, no use of accessory " muscles.  No wheeze.  ABDOMEN: Soft, nontender, nondistended. No appreciable HSM.  Bowel sounds normal.  No masses.  SKIN: Warm and dry without cutaneous eruptions.   Left foot dressing in place, right arm PICC okay placed 10/27.  Splint in place.  No drainage.  PSYCHIATRIC: Mental status with occasional confusion.  But overall pleasant.  Follows commands.    EXT: Left foot surgical dressing in place.  No crepitus, some drainage from webspace. Normal range of motion.  NEURO: Oriented to name, nonfocal.  Follows some commands and pleasant with me.        Results Review:   I reviewed the patient's new clinical results.  I reviewed the patient's new imaging results and agree with the interpretation.  I reviewed the patient's other test results and agree with the interpretation    Results from last 7 days   Lab Units 11/15/22  1646 11/11/22  0627   WBC 10*3/mm3 6.70 6.95   HEMOGLOBIN g/dL 8.7* 8.8*   HEMATOCRIT % 28.4* 29.4*   PLATELETS 10*3/mm3 310 352     Results from last 7 days   Lab Units 11/15/22  1646   SODIUM mmol/L 142   POTASSIUM mmol/L 4.0   CHLORIDE mmol/L 108*   CO2 mmol/L 23.0   BUN mg/dL 16   CREATININE mg/dL 1.09   GLUCOSE mg/dL 141*   CALCIUM mg/dL 9.1     Results from last 7 days   Lab Units 11/15/22  1646   ALK PHOS U/L 99   BILIRUBIN mg/dL <0.2   ALT (SGPT) U/L 17   AST (SGOT) U/L 14             Results from last 7 days   Lab Units 11/14/22  1342   VANCOMYCIN TR mcg/mL 16.30         Estimated Creatinine Clearance: 85.9 mL/min (by C-G formula based on SCr of 1.09 mg/dL).  CPK    Common Labsle 10/19/22   Creatine Kinase 119            Procalitonin Results:       Brief Urine Lab Results  (Last result in the past 365 days)      Color   Clarity   Blood   Leuk Est   Nitrite   Protein   CREAT   Urine HCG        06/12/22 2056 Yellow   Clear     Negative                    No results found for: SITE, ALLENTEST, PHART, LTP8NWJ, PO2ART, YBV0CXR, BASEEXCESS, S7WICUOM, HGBBG, HCTABG, OXYHEMOGLOBI, METHHGBN,  CARBOXYHGB, CO2CT, BAROMETRIC, MODALITY, FIO2     Microbiology:  Microbiology Results (last 10 days)     ** No results found for the last 240 hours. **        Blood and wound cultures 10/14 pending.       No results found for: TISSCXQ, CULTURES, CULTURE, BLOODCX, ANACX, BALCX, ACIDFASTCX, BODYFLDCX, CXREFLEX, FUNGUSCX, RESPCX, MRSACX, ROUTCX, BRCHWSHCLT, TISSUECX, URINECX, CMVCX, WOUNDCX, BCIDPCR, BFCULTURE, BLOODCULT(      Radiology:  Imaging Results (Last 72 Hours)     ** No results found for the last 72 hours. **          IMPRESSION:   1. Left hallux osteomyelitis after trauma/displaced fracture 9/17.  At risk for fracture not healing in the first toe given the infection, noncompliance with walking on foot, and ongoing infection.  Likely gangrene and poor wound healing related to vascular disease.  Resolving after surgery 10/18.  2. 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  Cx with MRSA, Enterococcus faecalis, and Corynebacterium striatum.  Initially treated with Daptomycin and discharge on oral Zyvox and oral Rifampin until 10/26/22.  Cx with Raoultella ornithinolytica (sens to Cefepime, ceftriaxone) and Klebsiella.  Status post left first ray resection 10/18/2022.  Improved.  3. Lactic acidosis, related to above. Resolved.   4. Elevated inflammatory markers, related to above.  CRP 3.07 on 10/14.  CRP 3.10 on 10/26.  5. Type 2 diabetes mellitus/peripheral neuropathy.  Affects wound healing.  6. Hyperglycemia, related to above.  7. Anemia of chronic disease.    8. Essential hypertension.  Controlled.  9. Gastroesophageal reflux disease.  10. Thrombocytosis.  Related to above issues.  Resolved.  11. Psychosis since April 2022 reported by the patient's sister, thought to be related to drug and alcohol use.  Was hospitalized previously at [facility].  Awaiting placement.  Seems to be more even keel and cooperative.  12. Hyponatremia, resolved.  13. H/o  MRSA.  Prob involved left foot as well.  14. Hypocalcemia resolved.  15. Elevated alkaline phosphatase resolved.    Tolerating current regimen.  Discussed previously with nursing and family.  Main issue is placement.  Discussed with case management.    Plan:  1. Diagnostically, follow blood and wound cultures, imaging, physical examination, CBC, CMP, CRP, and vancomycin levels.   2. Therapeutically, continue ceftriaxone and Vancomycin for 6 weeks of IV antibiotics until 11/25/22.  This will cover the organisms including previous MRSA.  Vancomycin dose previously increased to 1 g IV every 12 hours, now 750 mg every 12 hours on 11/7.  3. Orthopedic surgery status post left first ray resection for osteomyelitis 10/18/2022.   4. Awaiting placement.  Unable to obtain.  5. Continue supportive care.  PICC placement right arm 10/27/2022.  Placement major issue.    I discussed the patient's findings and my recommendations with patient and his family.  He will need to be in a skilled facility to receive his antibiotics.    Our group would be pleased to follow this patient over the course of their hospitalization and assist with outpatient antimicrobial therapy, as indicated.  Further recommendations depend on the results of the cultures and clinical course. Discussed with patient's sister and family.  Difficult issue with compliance in terms of wound care and staying off his foot.      Case management orders: Please arrange for skilled facility IV antibiotics with ceftriaxone 2 g IV daily, vancomycin 1 g IV every 12 hours to continue until 11/25/2022.  Pharmacy to adjust dose of vancomycin based on weekly trough levels to try to maintain level between 12 and 18.  Check CBC, CMP, CRP, vancomycin trough weekly while on IV antibiotics.  Fax orders to [phone], call [phone] with final arrangements.  The treatment is for left first toe osteomyelitis with MRSA, Klebsiella, and Raoultella ornithinolytica.  Arrange for follow-up with  me in 2 weeks post discharge.  Weekly PICC dressing changes.    Rayray Gordon MD  11/16/2022I

## 2022-11-16 NOTE — DISCHARGE PLACEMENT REQUEST
"Prema Sung (67 y.o. Male)     Date of Birth   1955    Social Security Number       Address   [address] [place] [address]    Home Phone   [phone]    MRN   [id number]       Mandaeism   None    Marital Status   Single                            Admission Date   10/14/22    Admission Type   Emergency    Admitting Provider   Rusty Larsen MD    Attending Provider   Rusty Larsen MD    Department, Room/Bed   [facility] 3G, S363/1       Discharge Date       Discharge Disposition   Rehab Facility or Unit (DC - External)    Discharge Destination                               Attending Provider: Rusty Larsen MD    Allergies: No Known Allergies    Isolation: None   Infection: None   Code Status: CPR    Ht: 182.9 cm (72\")   Wt: 89.9 kg (198 lb 4.8 oz)    Admission Cmt: None   Principal Problem: Acute osteomyelitis of left foot (HCC) [M86.172]                 Active Insurance as of 10/14/2022     Primary Coverage     Payor Plan Insurance Group Employer/Plan Group    MEDICARE MEDICARE A & B      Payor Plan Address Payor Plan Phone Number Payor Plan Fax Number Effective Dates    [address] [phone]  8/1/2022 - None Entered    [facility] [id number]       Subscriber Name Subscriber Birth Date Member ID       PREMA SUNG 1955 [id number]           Secondary Coverage     Payor Plan Insurance Group Employer/Plan Group    [facility] COMMUNITY PLAN [facility] COMMUNITY PLAN [facility]     Payor Plan Address Payor Plan Phone Number Payor Plan Fax Number Effective Dates    [address]   9/1/2022 - None Entered    [facility] [id number]       Subscriber Name Subscriber Birth Date Member ID       PREMA SUNG RALF 1955 [id number]                 Emergency Contacts      (Rel.) Home Phone Work Phone Mobile Phone    Idalia Lerma (Daughter) -- -- [phone]    Thang Horton (Sister) [phone] -- --               Discharge Summary      Nicole Henriquez DO at " "10/27/22 0851              [facility] Medicine Services  DISCHARGE SUMMARY    Patient Name: Omkar Nava  : 1955  MRN: [id number]    Date of Admission: 10/14/2022  8:17 PM  Date of Discharge:  10/27/22  Primary Care Physician: Provider, No Known    Consults     Date and Time Order Name Status Description    10/15/2022 12:34 AM Inpatient Infectious Diseases Consult Completed     10/15/2022 12:34 AM Inpatient Orthopedic Surgery Consult Completed           Hospital Course     Presenting Problem:   Acute osteomyelitis of left foot (HCC) [M86.172]    Active Hospital Problems    Diagnosis  POA   • **Acute osteomyelitis of left foot (HCC) [M86.172]  Yes   • Type 2 diabetes mellitus (HCC) [E11.9]  Yes   • Essential hypertension [I10]  Yes   • Psychotic disorder (HCC) [F29]  Yes      Resolved Hospital Problems   No resolved problems to display.          Hospital Course:  Omkar Nava is a 67 y.o. male with  past medical history significant for diabetes mellitus type 2, essential hypertension and unspecified psychotic disorder presents to the ED due to worsening left foot wound. Per my partner's note,\" patient presented to [facility] on 2022 due to mechanical fall causing open articular fracture on the left foot.  He was treated at that time with bedside flushing and repair by podiatry.  He was given Ancef and sent back to nursing facility on Keflex.  Wound cultures at that time grew MRSA however blood culture showed no growth.  On 2022 patient presented back to hospital in [place] due to worsening foot wound and noted drainage.  He underwent left foot debridement and I&D on 2022.  He again underwent left foot debridement and I&D but required no amputation on 2022.  PICC line was placed on 10/3/2022.  Patient was to receive daptomycin however due to expense this was changed to rifampin and linezolid.  PICC line was removed.  At some point patient " "had transferred from nursing facility in [place] to [place] to be closer to family\"    Osteomyelitis of left foot/1st great toe (s/p amputation)  Raoultella Ornithinolytica growing from wound  - MRI shows -Wound around the great toe with geographic marrow signal alteration within the distal aspect of the first metatarsal as well as both phalanges of the great toe indicative of osteomyelitis  -s/p left great toe amputation on 10/18/22 by Dr. Petty  - NWB LLE. Keep splint clean and dry. sq heparin for dvt prophylaxia for now & home on asa 325mg daily x 30 days; f/u Dr. Petty as scheduled 11/8.  - PICC placed 10/27  - ID following (Dr. Gordon): rocephin & vanc tentatively through 11/25/22. Patient will need weekly labs, PICC dressing change. Follow up with Dr. Kaur in 2 weeks     Per ID: Pharmacy to adjust dose of vancomycin based on weekly trough levels to try to maintain level between 12 and 18.  Check CBC, CMP, CRP, vancomycin trough weekly while on IV antibiotics.  Fax orders to [phone], call [phone] with final arrangements    Anemia  Iron def  - completed 3 days iv iron on 10/20/22  - Hgb stable    DM II (a1c 7.0)  - continue levemir 10 units sq nightly; increase humalog to 8 unit tid meals. SSI   - Recommend closely monitoring with restarting metformin. May need to decrease insulin use.     HTN  - Increased to amlodipine 10 mg daily and lisinopril 40 mg daily with improvement     Psychotic disorder  Dementia   -re-started olanzapine 10/15, was recently hospitalized at [facility] for psychiatric issues; apparently prn haldol was not much help.  - Could consider PRN geodon or haldol if any issues with mentation arise     Discharge Follow Up Recommendations for outpatient labs/diagnostics:  PCP 1 week  Orthopedics Dr. Petty 11/8   [facility] Dr. Gordon 2 weeks     Day of Discharge     HPI:   Restless overnight. This morning calm and cooperative. States he doesn't know if he will get better. Reviewed plans for " rehab and next step to getting him home. Called and updated daughter. Reviewed antibiotics, follow ups and current plan. All questions answered to the best of my ability.     Review of Systems  Gen- No fevers, chills  CV- No chest pain, palpitations  Resp- No cough, dyspnea  GI- No N/V/D, abd pain      Vital Signs:   Temp:  [98.2 °F (36.8 °C)-98.3 °F (36.8 °C)] 98.3 °F (36.8 °C)  Heart Rate:  [66-82] 82  Resp:  [18-20] 20  BP: (132-172)/(70-75) 161/75      Physical Exam:  Constitutional: No acute distress, awake, alert  HENT: NCAT, mucous membranes moist  Respiratory: Clear to auscultation bilaterally, respiratory effort normal   Cardiovascular: RRR, no murmurs, rubs, or gallops  Gastrointestinal: Positive bowel sounds, soft, nontender, nondistended  Musculoskeletal: No bilateral ankle edema; ace bandage left lower ext  Psychiatric: flat affect, cooperative  Neurologic:  strength symmetric in all extremities, Cranial Nerves grossly intact to confrontation, speech clear  Skin: No rashes    Pertinent  and/or Most Recent Results     LAB RESULTS:      Lab 10/26/22  0527 10/22/22  0622 10/21/22  0353   WBC 6.87 9.33 9.51   HEMOGLOBIN 8.3* 8.2* 7.6*   HEMATOCRIT 27.4* 26.7* 24.2*   PLATELETS 284 261 267   NEUTROS ABS 4.26  --   --    IMMATURE GRANS (ABS) 0.02  --   --    LYMPHS ABS 1.54  --   --    MONOS ABS 0.69  --   --    EOS ABS 0.33  --   --    MCV 92.3 92.4 89.3   CRP 3.10*  --   --          Lab 10/26/22  0527 10/25/22  0546 10/24/22  0904 10/22/22  0622 10/21/22  0353   SODIUM 137 139 138 138 134*   POTASSIUM 4.5 4.5 4.8 4.1 4.3   CHLORIDE 101 102 101 104 102   CO2 28.0 28.0 30.0* 25.0 25.0   ANION GAP 8.0 9.0 7.0 9.0 7.0   BUN 18 17 14 11 15   CREATININE 0.87 0.90 0.81 0.75* 0.87   EGFR 95.8 94.8 97.8 100.1 95.8   GLUCOSE 323* 219* 227* 151* 271*   CALCIUM 8.6 9.1 9.2 8.8 8.5*   MAGNESIUM  --   --   --   --  2.0         Lab 10/26/22  0527   TOTAL PROTEIN 6.5   ALBUMIN 2.90*   GLOBULIN 3.6   ALT (SGPT) 22   AST  (SGOT) 16   BILIRUBIN <0.2   ALK PHOS 132*                     Brief Urine Lab Results  (Last result in the past 365 days)      Color   Clarity   Blood   Leuk Est   Nitrite   Protein   CREAT   Urine HCG        06/12/22 2056 Yellow   Clear     Negative                   Microbiology Results (last 10 days)     Procedure Component Value - Date/Time    COVID PRE-OP / PRE-PROCEDURE SCREENING ORDER (NO ISOLATION) - Swab, Nasopharynx [[id number]]  (Normal) Collected: 10/27/22 0510    Lab Status: Final result Specimen: Swab from Nasopharynx Updated: 10/27/22 0530    Narrative:      The following orders were created for panel order COVID PRE-OP / PRE-PROCEDURE SCREENING ORDER (NO ISOLATION) - Swab, Nasopharynx.  Procedure                               Abnormality         Status                     ---------                               -----------         ------                     COVID-19, ABBOTT IN-HOUS...[[id number]]  Normal              Final result                 Please view results for these tests on the individual orders.    COVID-19, ABBOTT IN-HOUSE,NASAL Swab (NO TRANSPORT MEDIA) 2 HR TAT - Swab, Nasopharynx [[id number]]  (Normal) Collected: 10/27/22 0510    Lab Status: Final result Specimen: Swab from Nasopharynx Updated: 10/27/22 0530     COVID19 Presumptive Negative    Narrative:      Fact sheet for providers: https://www.fda.gov/media/206806/download     Fact sheet for patients: https://www.fda.gov/media/249754/download    Test performed by PCR.  If inconsistent with clinical signs and symptoms patient should be tested with different authorized molecular test.    Anaerobic Culture - Tissue, Toe, Left [[id number]]  (Normal) Collected: 10/18/22 1608    Lab Status: Final result Specimen: Tissue from Toe, Left Updated: 10/23/22 0543     Anaerobic Culture No anaerobes isolated at 5 days    Fungus Culture - Tissue, Toe, Left [[id number]] Collected: 10/18/22 1608    Lab Status: Preliminary result Specimen: Tissue from  Toe, Left Updated: 10/25/22 1815     Fungus Culture No fungus isolated at 1 week    Tissue / Bone Culture - Tissue, Toe, Left [[id number]]  (Abnormal)  (Susceptibility) Collected: 10/18/22 1608    Lab Status: Final result Specimen: Tissue from Toe, Left Updated: 10/21/22 0815     Tissue Culture Rare Klebsiella pneumoniae ssp pneumoniae     Comment: One colony        Gram Stain Few (2+) WBCs seen      No organisms seen    Susceptibility      Klebsiella pneumoniae ssp pneumoniae      KB      Ampicillin Resistant     Ampicillin + Sulbactam Susceptible      Cefepime Susceptible      Ceftazidime Susceptible      Ceftriaxone Susceptible      Gentamicin Susceptible      Levofloxacin Susceptible      Piperacillin + Tazobactam Susceptible      Tetracycline Susceptible      Trimethoprim + Sulfamethoxazole Susceptible                       Susceptibility Comments     Klebsiella pneumoniae ssp pneumoniae    Cefazolin sensitivity will not be reported for Enterobacteriaceae in non-urine isolates. If cefazolin is preferred, please call the microbiology lab to request an E-test.  With the exception of urinary-sourced infections, aminoglycosides should not be used as monotherapy.             AFB Culture - Tissue, Toe, Left [[id number]] Collected: 10/18/22 1608    Lab Status: Preliminary result Specimen: Tissue from Toe, Left Updated: 10/25/22 1815     AFB Culture No AFB isolated at 1 week     AFB Stain No acid fast bacilli seen on concentrated smear    Anaerobic Culture - Tissue, Toe, Left [[id number]]  (Normal) Collected: 10/18/22 1552    Lab Status: Final result Specimen: Tissue from Toe, Left Updated: 10/23/22 0543     Anaerobic Culture No anaerobes isolated at 5 days    Fungus Culture - Tissue, Toe, Left [[id number]] Collected: 10/18/22 1552    Lab Status: Preliminary result Specimen: Tissue from Toe, Left Updated: 10/25/22 1800     Fungus Culture No fungus isolated at 1 week    Tissue / Bone Culture - Tissue, Toe, Left [[id number]]  Collected: 10/18/22 1552    Lab Status: Final result Specimen: Tissue from Toe, Left Updated: 10/21/22 0840     Tissue Culture No growth at 3 days     Gram Stain Moderate (3+) WBCs seen      No organisms seen    AFB Culture - Tissue, Toe, Left [[id number]] Collected: 10/18/22 1552    Lab Status: Preliminary result Specimen: Tissue from Toe, Left Updated: 10/25/22 1800     AFB Culture No AFB isolated at 1 week     AFB Stain No acid fast bacilli seen on concentrated smear          Peripheral Block    Result Date: 10/18/2022  Gualberto Lee MD     10/18/2022  1:30 PM Peripheral Block Patient reassessed immediately prior to procedure Patient location during procedure: pre-op Reason for block: at surgeon's request and post-op pain management Performed by Anesthesiologist: Gualberto Lee MD CRNA/CAA: Barney Cox, CRNA Assisted by: Bela Cervantes RN Preanesthetic Checklist Completed: patient identified, IV checked, site marked, risks and benefits discussed, surgical consent, monitors and equipment checked, pre-op evaluation and timeout performed Prep: Sterile barriers:cap, gloves, mask and washed/disinfected hands Prep: ChloraPrep Patient monitoring: blood pressure monitoring, continuous pulse oximetry and EKG Procedure Sedation: yes Performed under: local infiltration Guidance:ultrasound guided ULTRASOUND INTERPRETATION.  Using ultrasound guidance a 20 G gauge needle was placed in close proximity to the nerve, at which point, under ultrasound guidance anesthetic was injected in the area of the nerve and spread of the anesthesia was seen on ultrasound in close proximity thereto.  There were no abnormalities seen on ultrasound; a digital image was taken; and the patient tolerated the procedure with no complications. Images:still images obtained, printed/placed on chart Block Type:popliteal Injection Technique:single-shot Needle Type:echogenic and Tuohy Needle Gauge:18 G Resistance on Injection: none Catheter Size:20 G  "Medications Used: fentaNYL citrate (PF) (SUBLIMAZE) injection - Intravenous  100 mcg - 10/18/2022 1:29:00 PM dexamethasone sodium phosphate injection - Injection  2 mg - 10/18/2022 1:29:00 PM bupivacaine PF (MARCAINE) 0.25 % injection - Injection  30 mL - 10/18/2022 1:29:00 PM Post Assessment Injection Assessment: negative aspiration for heme, no paresthesia on injection and incremental injection Patient Tolerance:comfortable throughout block Complications:no Additional Notes SINGLE shot  A high-frequency linear transducer, with sterile cover, was placed in the popliteal fossa to identify the popliteal artery and vein, Tibial nerve (TN) and Common Peroneal nerve (CP). The transducer was then moved in a cephalad fashion to observe the TN and CP nerve bifurcation to form the Sciatic Nerve. The insertion site was prepped and draped in sterile fashion. Skin and cutaneous tissue was infiltrated with 2-5 ml of 1% Lidocaine. Using ultrasound-guidance, a 20-gauge B-Rivera 4\" Ultraplex 360 non-stimulating echogenic needle was then inserted and advanced in plane from lateral to medial. Preservative-free normal saline was utilized for hydro-dissection of tissue, advancement of Touhy, and to confirm final needle placement posterior to the nerves. Local anesthetic injection spread, in incremental 3-5 ml injections, to surround both nerve structures. Aspiration every 5 ml to prevent intravascular injection. Injection was completed with negative aspiration of blood and negative intravascular injection. Injection pressures were normal with minimal resistance.                   Plan for Follow-up of Pending Labs/Results:   Pending Labs     Order Current Status    AFB Culture - Tissue, Toe, Left Preliminary result    AFB Culture - Tissue, Toe, Left Preliminary result    Fungus Culture - Tissue, Toe, Left Preliminary result    Fungus Culture - Tissue, Toe, Left Preliminary result        Discharge Details        Discharge Medications    "   New Medications      Instructions Start Date   amLODIPine 10 MG tablet  Commonly known as: NORVASC   10 mg, Oral, Every 24 Hours Scheduled      Aspirin 325 MG capsule   325 mg, Oral, Daily      cefTRIAXone  Commonly known as: ROCEPHIN   2 g, Intravenous, Every 24 Hours      heparin (porcine) 5000 UNIT/ML injection   5,000 Units, Subcutaneous, Every 8 Hours Scheduled      insulin detemir 100 UNIT/ML injection  Commonly known as: LEVEMIR   10 Units, Subcutaneous, Nightly      Insulin Lispro 100 UNIT/ML injection  Commonly known as: humaLOG   0-7 Units, Subcutaneous, 3 Times Daily Before Meals      Insulin Lispro 100 UNIT/ML injection  Commonly known as: humaLOG   8 Units, Subcutaneous, 3 Times Daily With Meals      oxyCODONE 5 MG immediate release tablet  Commonly known as: ROXICODONE   5 mg, Oral, Every 4 Hours PRN      vancomycin 1-5 GM/200ML-% IVPB  Commonly known as: VANCOCIN   1,000 mg, Intravenous, Every 12 Hours         Changes to Medications      Instructions Start Date   acetaminophen 325 MG tablet  Commonly known as: TYLENOL  What changed:   · when to take this  · reasons to take this   650 mg, Oral, Every 6 Hours PRN      acetaminophen 325 MG tablet  Commonly known as: TYLENOL  What changed: You were already taking a medication with the same name, and this prescription was added. Make sure you understand how and when to take each.   650 mg, Oral, Every 4 Hours PRN      lisinopril 40 MG tablet  Commonly known as: PRINIVILZESTRIL  What changed:   · medication strength  · how much to take   40 mg, Oral, Daily         Continue These Medications      Instructions Start Date   D3 2000 PO   3 capsules, Oral, Every Morning      ferrous sulfate 325 (65 FE) MG tablet   325 mg, Oral, Daily With Breakfast      melatonin 1 MG tablet   3 mg, Oral, Nightly PRN      metFORMIN 500 MG tablet  Commonly known as: GLUCOPHAGE   500 mg, Oral, 2 Times Daily With Meals      OLANZapine 5 MG tablet  Commonly known as: zyPREXA   5  mg, Oral, 2 Times Daily, QAM and QHS         Stop These Medications    linezolid 600 MG tablet  Commonly known as: ZYVOX     promethazine 25 MG tablet  Commonly known as: PHENERGAN     rifAMPin 300 MG capsule  Commonly known as: RIFADIN            No Known Allergies      Discharge Disposition:  Skilled Nursing Facility (DC - External)    Diet:  Hospital:  Diet Order   Procedures   • Diet Regular       Activity:  Activity Instructions     Other Activity Instructions      Activity Instructions: non weight bearing LLE          Restrictions or Other Recommendations:         CODE STATUS:    Code Status and Medical Interventions:   Ordered at: 10/14/22 2230     Level Of Support Discussed With:    Patient     Code Status (Patient has no pulse and is not breathing):    CPR (Attempt to Resuscitate)     Medical Interventions (Patient has pulse or is breathing):    Full Support       Future Appointments   Date Time Provider Department Center   10/27/2022  7:30 PM EMS 1 [facility]   11/8/2022 10:00 AM Yeison Petty MD [facility]       Additional Instructions for the Follow-ups that You Need to Schedule     Discharge Follow-up with PCP   As directed       Currently Documented PCP:    Provider, No Known    PCP Phone Number:    None     Follow Up Details: PCP 1 week         Discharge Follow-up with Specified Provider: Infectious Disease Dr. Gordon 1-2 weeks   As directed      To: Infectious Disease Dr. Gordon 1-2 weeks         Discharge Follow-up with Specified Provider: Jovanny   As directed      To: Jovanny    Follow Up Details: 2 to 3 weeks         Discharge Follow-up with Specified Provider: Orthopedics Dr. Petty 1-2 weeks   As directed      To: Orthopedics Dr. Petty 1-2 weeks                     Nicole Henriquez DO  10/27/22      Time Spent on Discharge:  I spent  35  minutes on this discharge activity which included: face-to-face encounter with the patient, reviewing the data in the system, coordination of the care  with the nursing staff as well as consultants, documentation, and entering orders.            Electronically signed by Nicole Henriquez DO at 10/27/22 0928     Rusty Larsen MD at 22 1248              [facility] Medicine Services  DISCHARGE SUMMARY    Patient Name: Omkar Nava  : 1955  MRN: [id number]    Date of Admission: 10/14/2022  8:17 PM  Date of Discharge:  22  Primary Care Physician: Provider, No Known    Consults     Date and Time Order Name Status Description    10/15/2022 12:34 AM Inpatient Infectious Diseases Consult Completed     10/15/2022 12:34 AM Inpatient Orthopedic Surgery Consult Completed           Hospital Course     Presenting Problem:   Acute osteomyelitis of left foot (HCC) [M86.172]    Active Hospital Problems    Diagnosis  POA   • **Acute osteomyelitis of left foot (HCC) [M86.172]  Yes   • Neurocognitive disorder [R41.9]  Unknown   • Type 2 diabetes mellitus (HCC) [E11.9]  Yes   • Essential hypertension [I10]  Yes   • Psychotic disorder (HCC) [F29]  Yes      Resolved Hospital Problems   No resolved problems to display.          Hospital Course:  Omkar Nava is a 67 y.o. male with PMH significant for HTN, DMII and unspecified psychotic disorder. He previously worked as a  but has been unable to work for past 2-3 years and has become homeless.      His health declined in 2022 with an acute psychotic event with subsequent psychiatric hospitalization at [facility] from  to 2022. He was subsequently transferred to a nursing facility in [place]. On 22, daughter visited him at the nursing home and he had a significant injury to his L great toe, causing an open articular fracture. He was admitted to [facility] and treated at that time with bedside flushing and repair by podiatry. He was given Ancef and sent back to nursing facility on Keflex. Wound cultures at  that time grew MRSA, however blood culture showed no growth. On 9/27/2022, he presented to [facility] in [place] due to worsening foot wound and drainage. He underwent L foot I&D and debridement on 9/30/22. A PICC was placed on 10/3/22. He was to receive Daptomycin but due to expense, this was changed to Rifampin and Linezolid and PICC was removed. He was discharged to [facility] in [facility] on 10/6/22 and at some point, was transferred to [place], in order to be closer to family. Daughter visited him on 10/14/22 and became concerned about the appearance of his toe. She brought him to [facility] ED for further evaluation. ID and orthopedic surgery are following.         Osteomyelitis of left foot / L first toe (s/p amputation)  Wound culture (+) Raoultella ornithinolytica   - 10/15/22 MRI of L foot showed wound around the great toe with geographic marrow signal alteration within the distal aspect of the first metatarsal as well as both phalanges of the great toe indicative of osteomyelitis  - He is s/p L great toe amputation on 10/18/22 by Dr. Petty  - Sutures removed by Dr. Petty 11/15.  - Now heel weight-bearing as tolerated, offloading shoe to LLE  - Elevate operative extremity  - Continue DVT prophylax ASA 325mg daily x 30 days, should finish 11/18  - Per Dr. Petty, brianna to [place] when arrangements are complete. Will need to f/u in 2 weeks for wound check.  -ID, Dr. Gordon following. IV antibiotics with ceftriaxone 2 g IV daily, vancomycin 1 g IV every 12 hours to continue until 11/25/2022.  Pharmacy to adjust dose of vancomycin based on weekly trough levels to try to maintain level between 12 and 18.  Check CBC, CMP, CRP, vancomycin trough weekly while on IV antibiotics.  Fax results, to [phone].  The treatment is for left first toe osteomyelitis with MRSA, Klebsiella, and Raoultella ornithinolytica.  Arrange for follow-up with Dr Gordon in 2 weeks post discharge.  Weekly PICC dressing  changes.     Iron-deficiency anemia   - Completed 3 days IV iron on 10/20/22  - hemoglobin stable  - on oral ferrous sulfate daily      Non-insulin dependent DMII  - Hgb A1c 7.0%   - Continue Metformin 1000mg BID, add Glipizide 2.5mg daily      Hypertension  - discontinued Norvasc  - on lisinopril  - on Procardia XL.  BP stable.     Neurocognitive disorder  Dementia   - Re-started olanzapine 10/15/22, was recently hospitalized at [facility] for psychiatric issues; apparently PRN Haldol was not much help, have discontinued  - Continue Olanzapine 5mg BID    Discharge Follow Up Recommendations for outpatient labs/diagnostics:  Check CBC, CMP, CRP, vancomycin trough weekly while on IV antibiotics.    Day of Discharge     HPI:   Feels ok. Denies foot pain.  Aware of transfer today.    Review of Systems  Gen- No fevers, chills  CV- No chest pain, palpitations  Resp- No cough, dyspnea  GI- No N/V/D, abd pain        Vital Signs:   Temp:  [97.8 °F (36.6 °C)-98.5 °F (36.9 °C)] 98.5 °F (36.9 °C)  Heart Rate:  [68-75] 68  Resp:  [18] 18  BP: (143-168)/(58-83) 168/83      Physical Exam:  Constitutional - no acute distress, nontoxic, in bed  HEENT-NCAT, mucous membranes moist  CV-RRR, S1 S2 normal, no m/r/g  Resp-CTAB  Abd-soft, nontender, nondistended, normoactive bowel sounds  Ext-Foot in rigid boot  Neuro-alert, speech clear, moves all extremities   Psych-normal affect   Skin- No rash on exposed UE or LE bilaterally      Pertinent  and/or Most Recent Results     LAB RESULTS:      Lab 11/15/22  1646 11/11/22  0627   WBC 6.70 6.95   HEMOGLOBIN 8.7* 8.8*   HEMATOCRIT 28.4* 29.4*   PLATELETS 310 352   NEUTROS ABS 4.25  --    IMMATURE GRANS (ABS) 0.02  --    LYMPHS ABS 1.55  --    MONOS ABS 0.59  --    EOS ABS 0.25  --    MCV 90.7 93.6         Lab 11/15/22  1646 11/14/22  0514 11/13/22  0749 11/12/22  0429 11/11/22  0627   SODIUM 142 139 139 139 137   POTASSIUM 4.0 4.1 4.1 4.0 4.3   CHLORIDE 108* 104 105 104 104   CO2 23.0 25.0 26.0  26.0 22.0   ANION GAP 11.0 10.0 8.0 9.0 11.0   BUN 16 24* 22 24* 25*   CREATININE 1.09 1.09 1.11 0.85 1.01   EGFR 75.3 75.3 73.7 96.4 82.5   GLUCOSE 141* 137* 76 139* 138*   CALCIUM 9.1 9.1 9.4 9.3 9.5         Lab 11/15/22  1646 11/11/22  0627   TOTAL PROTEIN 7.2 7.3   ALBUMIN 3.20* 3.10*   GLOBULIN 4.0 4.2   ALT (SGPT) 17 15   AST (SGOT) 14 17   BILIRUBIN <0.2 <0.2   ALK PHOS 99 107                     Brief Urine Lab Results  (Last result in the past 365 days)      Color   Clarity   Blood   Leuk Est   Nitrite   Protein   CREAT   Urine HCG        06/12/22 2056 Yellow   Clear     Negative                   Microbiology Results (last 10 days)     Procedure Component Value - Date/Time    COVID PRE-OP / PRE-PROCEDURE SCREENING ORDER (NO ISOLATION) - Swab, Nasopharynx [[id number]]  (Normal) Collected: 11/16/22 1140    Lab Status: Final result Specimen: Swab from Nasopharynx Updated: 11/16/22 1216    Narrative:      The following orders were created for panel order COVID PRE-OP / PRE-PROCEDURE SCREENING ORDER (NO ISOLATION) - Swab, Nasopharynx.  Procedure                               Abnormality         Status                     ---------                               -----------         ------                     COVID-19, ABBOTT IN-HOUS...[[id number]]  Normal              Final result                 Please view results for these tests on the individual orders.    COVID-19, ABBOTT IN-HOUSE,NASAL Swab (NO TRANSPORT MEDIA) 2 HR TAT - Swab, Nasopharynx [[id number]]  (Normal) Collected: 11/16/22 1140    Lab Status: Final result Specimen: Swab from Nasopharynx Updated: 11/16/22 1216     COVID19 Presumptive Negative    Narrative:      Fact sheet for providers: https://www.fda.gov/media/754598/download     Fact sheet for patients: https://www.fda.gov/media/061716/download    Test performed by PCR.  If inconsistent with clinical signs and symptoms patient should be tested with different authorized molecular test.          No  radiology results for the last 10 days                Plan for Follow-up of Pending Labs/Results:   Pending Labs     Order Current Status    AFB Culture - Tissue, Toe, Left Preliminary result    AFB Culture - Tissue, Toe, Left Preliminary result    Fungus Culture - Tissue, Toe, Left Preliminary result    Fungus Culture - Tissue, Toe, Left Preliminary result        Discharge Details        Discharge Medications      New Medications      Instructions Start Date   Aspirin 325 MG capsule   325 mg, Oral, Daily      cefTRIAXone  Commonly known as: ROCEPHIN   2 g, Intravenous, Every 24 Hours      glipizide 5 MG tablet  Commonly known as: GLUCOTROL   2.5 mg, Oral, Daily      NIFEdipine CC 30 MG 24 hr tablet  Commonly known as: ADALAT CC   30 mg, Oral, Every 24 Hours Scheduled   Start Date: November 17, 2022     vancomycin in dextrose 5% 150 mL 750-5 MG/150ML-% IVPB  Commonly known as: VANCOCIN   750 mg, Intravenous, Every 12 Hours         Changes to Medications      Instructions Start Date   acetaminophen 325 MG tablet  Commonly known as: TYLENOL  What changed:   · when to take this  · reasons to take this   650 mg, Oral, Every 6 Hours PRN      acetaminophen 325 MG tablet  Commonly known as: TYLENOL  What changed: You were already taking a medication with the same name, and this prescription was added. Make sure you understand how and when to take each.   650 mg, Oral, Every 4 Hours PRN      lisinopril 40 MG tablet  Commonly known as: PRINIVIL,ZESTRIL  What changed:   · medication strength  · how much to take   40 mg, Oral, Daily      melatonin 5 MG tablet tablet  What changed:   · medication strength  · how much to take  · when to take this  · reasons to take this   5 mg, Oral, Nightly      metFORMIN 1000 MG tablet  Commonly known as: GLUCOPHAGE  What changed:   · medication strength  · how much to take   1,000 mg, Oral, 2 Times Daily With Meals         Continue These Medications      Instructions Start Date   D3 2000 PO    3 capsules, Oral, Every Morning      ferrous sulfate 325 (65 FE) MG tablet   325 mg, Oral, Daily With Breakfast      OLANZapine 5 MG tablet  Commonly known as: zyPREXA   5 mg, Oral, 2 Times Daily, QAM and QHS         Stop These Medications    linezolid 600 MG tablet  Commonly known as: ZYVOX     promethazine 25 MG tablet  Commonly known as: PHENERGAN     rifAMPin 300 MG capsule  Commonly known as: RIFADIN            No Known Allergies      Discharge Disposition:  Rehab Facility or Unit (DC - External)    Diet:  Hospital:  Diet Order   Procedures   • Diet Regular Texture (IDDSI 7); Regular Consistency; Regular/House Diet       Activity:  Activity Instructions     Other Activity Instructions      Activity Instructions: non weight bearing LLE          Restrictions or Other Recommendations:         CODE STATUS:    Code Status and Medical Interventions:   Ordered at: 10/14/22 2230     Level Of Support Discussed With:    Patient     Code Status (Patient has no pulse and is not breathing):    CPR (Attempt to Resuscitate)     Medical Interventions (Patient has pulse or is breathing):    Full Support       Future Appointments   Date Time Provider Department Center   11/16/2022  7:30 PM EMS 1 [facility]       Additional Instructions for the Follow-ups that You Need to Schedule     Discharge Follow-up with PCP   As directed       Currently Documented PCP:    Provider, No Known    PCP Phone Number:    None     Follow Up Details: PCP 1 week         Discharge Follow-up with Specified Provider: Infectious Disease Dr. Gordon 1-2 weeks   As directed      To: Infectious Disease Dr. Gordon 1-2 weeks         Discharge Follow-up with Specified Provider: Jovanny   As directed      To: Jovanny    Follow Up Details: 2 to 3 weeks         Discharge Follow-up with Specified Provider: Orthopedics Dr. Petty 1-2 weeks   As directed      To: Orthopedics Dr. Petty 1-2 weeks                     Rusty Larsen MD  11/16/22      Time Spent on  Discharge:  I spent  40  minutes on this discharge activity which included: face-to-face encounter with the patient, reviewing the data in the system, coordination of the care with the nursing staff as well as consultants, documentation, and entering orders.            Electronically signed by Rusty Larsen MD at 11/16/22 6755

## 2022-11-16 NOTE — CASE MANAGEMENT/SOCIAL WORK
Case Management Discharge Note      Final Note: Mr. Nava is being discharged today to a skilled nurisng bed at [facility] He will be transported there by [facility] and Transportation at 1900 tonight. Pharmacy has been updated in [facility]. He will need a COVID test and one has been ordered. PCS is on the chartlet. His daughter, Idalia, will be here at 1900 to assist with Mr. Nava's transportation. Nurse to call report to [phone].         Selected Continued Care - Admitted Since 10/14/2022     Destination Coordination complete.    Service Provider Selected Services Address Phone Fax Patient Preferred    [facility] [address] [facility] [address] [phone] --          Durable Medical Equipment    No services have been selected for the patient.              Dialysis/Infusion    No services have been selected for the patient.              Home Medical Care    No services have been selected for the patient.              Therapy    No services have been selected for the patient.              Community Resources    No services have been selected for the patient.              [facility]    No services have been selected for the patient.                  Transportation Services  Ambulance: [facility]    Final Discharge Disposition Code: 03 - skilled nursing facility (SNF)

## 2022-11-16 NOTE — DISCHARGE SUMMARY
[facility] Medicine Services  DISCHARGE SUMMARY    Patient Name: Omkar Nava  : 1955  MRN: [id number]    Date of Admission: 10/14/2022  8:17 PM  Date of Discharge:  22  Primary Care Physician: Provider, No Known    Consults     Date and Time Order Name Status Description    10/15/2022 12:34 AM Inpatient Infectious Diseases Consult Completed     10/15/2022 12:34 AM Inpatient Orthopedic Surgery Consult Completed           Hospital Course     Presenting Problem:   Acute osteomyelitis of left foot (HCC) [M86.172]    Active Hospital Problems    Diagnosis  POA   • **Acute osteomyelitis of left foot (HCC) [M86.172]  Yes   • Neurocognitive disorder [R41.9]  Unknown   • Type 2 diabetes mellitus (HCC) [E11.9]  Yes   • Essential hypertension [I10]  Yes   • Psychotic disorder (HCC) [F29]  Yes      Resolved Hospital Problems   No resolved problems to display.          Hospital Course:  Omkar Nava is a 67 y.o. male with PMH significant for HTN, DMII and unspecified psychotic disorder. He previously worked as a  but has been unable to work for past 2-3 years and has become homeless.      His health declined in 2022 with an acute psychotic event with subsequent psychiatric hospitalization at [facility] in [facility] from  to 2022. He was subsequently transferred to a nursing facility in [place]. On 22, daughter visited him at the nursing home and he had a significant injury to his L great toe, causing an open articular fracture. He was admitted to [facility] and treated at that time with bedside flushing and repair by podiatry. He was given Ancef and sent back to nursing facility on Keflex. Wound cultures at that time grew MRSA, however blood culture showed no growth. On 2022, he presented to [facility] in [place] due to worsening foot wound and drainage. He underwent L foot I&D and debridement on  9/30/22. A PICC was placed on 10/3/22. He was to receive Daptomycin but due to expense, this was changed to Rifampin and Linezolid and PICC was removed. He was discharged to [facility] in [facility] on 10/6/22 and at some point, was transferred to [place], in order to be closer to family. Daughter visited him on 10/14/22 and became concerned about the appearance of his toe. She brought him to [facility] ED for further evaluation. ID and orthopedic surgery are following.         Osteomyelitis of left foot / L first toe (s/p amputation)  Wound culture (+) Raoultella ornithinolytica   - 10/15/22 MRI of L foot showed wound around the great toe with geographic marrow signal alteration within the distal aspect of the first metatarsal as well as both phalanges of the great toe indicative of osteomyelitis  - He is s/p L great toe amputation on 10/18/22 by Dr. Petty  - Sutures removed by Dr. Petty 11/15.  - Now heel weight-bearing as tolerated, offloading shoe to LLE  - Elevate operative extremity  - Continue DVT prophylax ASA 325mg daily x 30 days, should finish 11/18  - Per brinana [place] to [place] when arrangements are complete. Will need to f/u in 2 weeks for wound check.  -ID, Dr. Gordon following. IV antibiotics with ceftriaxone 2 g IV daily, vancomycin 1 g IV every 12 hours to continue until 11/25/2022.  Pharmacy to adjust dose of vancomycin based on weekly trough levels to try to maintain level between 12 and 18.  Check CBC, CMP, CRP, vancomycin trough weekly while on IV antibiotics.  Fax results, to [phone].  The treatment is for left first toe osteomyelitis with MRSA, Klebsiella, and Raoultella ornithinolytica.  Arrange for follow-up with Dr Gordon in 2 weeks post discharge.  Weekly PICC dressing changes.     Iron-deficiency anemia   - Completed 3 days IV iron on 10/20/22  - hemoglobin stable  - on oral ferrous sulfate daily      Non-insulin dependent DMII  - Hgb A1c 7.0%   - Continue Metformin 1000mg  BID, add Glipizide 2.5mg daily      Hypertension  - discontinued Norvasc  - on lisinopril  - on Procardia XL.  BP stable.     Neurocognitive disorder  Dementia   - Re-started olanzapine 10/15/22, was recently hospitalized at [facility] for psychiatric issues; apparently PRN Haldol was not much help, have discontinued  - Continue Olanzapine 5mg BID    Discharge Follow Up Recommendations for outpatient labs/diagnostics:  Check CBC, CMP, CRP, vancomycin trough weekly while on IV antibiotics.    Day of Discharge     HPI:   Feels ok. Denies foot pain.  Aware of transfer today.    Review of Systems  Gen- No fevers, chills  CV- No chest pain, palpitations  Resp- No cough, dyspnea  GI- No N/V/D, abd pain        Vital Signs:   Temp:  [97.8 °F (36.6 °C)-98.5 °F (36.9 °C)] 98.5 °F (36.9 °C)  Heart Rate:  [68-75] 68  Resp:  [18] 18  BP: (143-168)/(58-83) 168/83      Physical Exam:  Constitutional - no acute distress, nontoxic, in bed  HEENT-NCAT, mucous membranes moist  CV-RRR, S1 S2 normal, no m/r/g  Resp-CTAB  Abd-soft, nontender, nondistended, normoactive bowel sounds  Ext-Foot in rigid boot  Neuro-alert, speech clear, moves all extremities   Psych-normal affect   Skin- No rash on exposed UE or LE bilaterally      Pertinent  and/or Most Recent Results     LAB RESULTS:      Lab 11/15/22  1646 11/11/22  0627   WBC 6.70 6.95   HEMOGLOBIN 8.7* 8.8*   HEMATOCRIT 28.4* 29.4*   PLATELETS 310 352   NEUTROS ABS 4.25  --    IMMATURE GRANS (ABS) 0.02  --    LYMPHS ABS 1.55  --    MONOS ABS 0.59  --    EOS ABS 0.25  --    MCV 90.7 93.6         Lab 11/15/22  1646 11/14/22  0514 11/13/22  0749 11/12/22  0429 11/11/22  0627   SODIUM 142 139 139 139 137   POTASSIUM 4.0 4.1 4.1 4.0 4.3   CHLORIDE 108* 104 105 104 104   CO2 23.0 25.0 26.0 26.0 22.0   ANION GAP 11.0 10.0 8.0 9.0 11.0   BUN 16 24* 22 24* 25*   CREATININE 1.09 1.09 1.11 0.85 1.01   EGFR 75.3 75.3 73.7 96.4 82.5   GLUCOSE 141* 137* 76 139* 138*   CALCIUM 9.1 9.1 9.4 9.3 9.5          Lab 11/15/22  1646 11/11/22  0627   TOTAL PROTEIN 7.2 7.3   ALBUMIN 3.20* 3.10*   GLOBULIN 4.0 4.2   ALT (SGPT) 17 15   AST (SGOT) 14 17   BILIRUBIN <0.2 <0.2   ALK PHOS 99 107                     Brief Urine Lab Results  (Last result in the past 365 days)      Color   Clarity   Blood   Leuk Est   Nitrite   Protein   CREAT   Urine HCG        06/12/22 2056 Yellow   Clear     Negative                   Microbiology Results (last 10 days)     Procedure Component Value - Date/Time    COVID PRE-OP / PRE-PROCEDURE SCREENING ORDER (NO ISOLATION) - Swab, Nasopharynx [[id number]]  (Normal) Collected: 11/16/22 1140    Lab Status: Final result Specimen: Swab from Nasopharynx Updated: 11/16/22 1216    Narrative:      The following orders were created for panel order COVID PRE-OP / PRE-PROCEDURE SCREENING ORDER (NO ISOLATION) - Swab, Nasopharynx.  Procedure                               Abnormality         Status                     ---------                               -----------         ------                     COVID-19, ABBOTT IN-HOUS...[[id number]]  Normal              Final result                 Please view results for these tests on the individual orders.    COVID-19, ABBOTT IN-HOUSE,NASAL Swab (NO TRANSPORT MEDIA) 2 HR TAT - Swab, Nasopharynx [[id number]]  (Normal) Collected: 11/16/22 1140    Lab Status: Final result Specimen: Swab from Nasopharynx Updated: 11/16/22 1216     COVID19 Presumptive Negative    Narrative:      Fact sheet for providers: https://www.fda.gov/media/302646/download     Fact sheet for patients: https://www.fda.gov/media/842400/download    Test performed by PCR.  If inconsistent with clinical signs and symptoms patient should be tested with different authorized molecular test.          No radiology results for the last 10 days                Plan for Follow-up of Pending Labs/Results:   Pending Labs     Order Current Status    AFB Culture - Tissue, Toe, Left Preliminary result    AFB Culture -  Tissue, Toe, Left Preliminary result    Fungus Culture - Tissue, Toe, Left Preliminary result    Fungus Culture - Tissue, Toe, Left Preliminary result        Discharge Details        Discharge Medications      New Medications      Instructions Start Date   Aspirin 325 MG capsule   325 mg, Oral, Daily      cefTRIAXone  Commonly known as: ROCEPHIN   2 g, Intravenous, Every 24 Hours      glipizide 5 MG tablet  Commonly known as: GLUCOTROL   2.5 mg, Oral, Daily      NIFEdipine CC 30 MG 24 hr tablet  Commonly known as: ADALAT CC   30 mg, Oral, Every 24 Hours Scheduled   Start Date: November 17, 2022     vancomycin in dextrose 5% 150 mL 750-5 MG/150ML-% IVPB  Commonly known as: VANCOCIN   750 mg, Intravenous, Every 12 Hours         Changes to Medications      Instructions Start Date   acetaminophen 325 MG tablet  Commonly known as: TYLENOL  What changed:   · when to take this  · reasons to take this   650 mg, Oral, Every 6 Hours PRN      acetaminophen 325 MG tablet  Commonly known as: TYLENOL  What changed: You were already taking a medication with the same name, and this prescription was added. Make sure you understand how and when to take each.   650 mg, Oral, Every 4 Hours PRN      lisinopril 40 MG tablet  Commonly known as: PRINIVIL,ZESTRIL  What changed:   · medication strength  · how much to take   40 mg, Oral, Daily      melatonin 5 MG tablet tablet  What changed:   · medication strength  · how much to take  · when to take this  · reasons to take this   5 mg, Oral, Nightly      metFORMIN 1000 MG tablet  Commonly known as: GLUCOPHAGE  What changed:   · medication strength  · how much to take   1,000 mg, Oral, 2 Times Daily With Meals         Continue These Medications      Instructions Start Date   D3 2000 PO   3 capsules, Oral, Every Morning      ferrous sulfate 325 (65 FE) MG tablet   325 mg, Oral, Daily With Breakfast      OLANZapine 5 MG tablet  Commonly known as: zyPREXA   5 mg, Oral, 2 Times Daily, QAM and  QHS         Stop These Medications    linezolid 600 MG tablet  Commonly known as: ZYVOX     promethazine 25 MG tablet  Commonly known as: PHENERGAN     rifAMPin 300 MG capsule  Commonly known as: RIFADIN            No Known Allergies      Discharge Disposition:  Rehab Facility or Unit (DC - External)    Diet:  Hospital:  Diet Order   Procedures   • Diet Regular Texture (IDDSI 7); Regular Consistency; Regular/House Diet       Activity:  Activity Instructions     Other Activity Instructions      Activity Instructions: non weight bearing LLE          Restrictions or Other Recommendations:         CODE STATUS:    Code Status and Medical Interventions:   Ordered at: 10/14/22 2230     Level Of Support Discussed With:    Patient     Code Status (Patient has no pulse and is not breathing):    CPR (Attempt to Resuscitate)     Medical Interventions (Patient has pulse or is breathing):    Full Support       Future Appointments   Date Time Provider Department Center   11/16/2022  7:30 PM EMS 1 [facility]       Additional Instructions for the Follow-ups that You Need to Schedule     Discharge Follow-up with PCP   As directed       Currently Documented PCP:    Provider, No Known    PCP Phone Number:    None     Follow Up Details: PCP 1 week         Discharge Follow-up with Specified Provider: Infectious Disease Dr. Gordon 1-2 weeks   As directed      To: Infectious Disease Dr. Gordon 1-2 weeks         Discharge Follow-up with Specified Provider: Jovanny   As directed      To: Jovanny    Follow Up Details: 2 to 3 weeks         Discharge Follow-up with Specified Provider: Orthopedics Dr. Petty 1-2 weeks   As directed      To: Orthopedics Dr. Petty 1-2 weeks                     Rusty Larsen MD  11/16/22      Time Spent on Discharge:  I spent  40  minutes on this discharge activity which included: face-to-face encounter with the patient, reviewing the data in the system, coordination of the care with the nursing staff as  well as consultants, documentation, and entering orders.

## 2022-11-16 NOTE — PLAN OF CARE
Goal Outcome Evaluation:         Pt. Non-combative this shift, speaking in calm tone, tolerating liquids and snack, refused meds and assessment this shift will try again. Splint ace wrap in place to left lower leg and foot.

## 2022-11-29 LAB
FUNGUS WND CULT: NORMAL
FUNGUS WND CULT: NORMAL
MYCOBACTERIUM SPEC CULT: NORMAL
MYCOBACTERIUM SPEC CULT: NORMAL
NIGHT BLUE STAIN TISS: NORMAL
NIGHT BLUE STAIN TISS: NORMAL

## 2022-11-30 ENCOUNTER — TRANSCRIBE ORDERS (OUTPATIENT)
Dept: LAB | Facility: HOSPITAL | Age: 67
End: 2022-11-30

## 2022-11-30 ENCOUNTER — OFFICE VISIT (OUTPATIENT)
Dept: ORTHOPEDIC SURGERY | Facility: CLINIC | Age: 67
End: 2022-11-30

## 2022-11-30 ENCOUNTER — PREP FOR SURGERY (OUTPATIENT)
Dept: OTHER | Facility: HOSPITAL | Age: 67
End: 2022-11-30

## 2022-11-30 ENCOUNTER — LAB (OUTPATIENT)
Dept: LAB | Facility: HOSPITAL | Age: 67
End: 2022-11-30

## 2022-11-30 VITALS — TEMPERATURE: 97.8 F

## 2022-11-30 DIAGNOSIS — S91.321D: ICD-10-CM

## 2022-11-30 DIAGNOSIS — R78.81 BACTEREMIA DUE TO KLEBSIELLA PNEUMONIAE: ICD-10-CM

## 2022-11-30 DIAGNOSIS — B96.89 BACTERIAL CHOLANGITIS: ICD-10-CM

## 2022-11-30 DIAGNOSIS — L03.032 ABSCESS OF FIFTH TOENAIL OF LEFT FOOT: ICD-10-CM

## 2022-11-30 DIAGNOSIS — K83.09 BACTERIAL CHOLANGITIS: ICD-10-CM

## 2022-11-30 DIAGNOSIS — B96.1 BACTEREMIA DUE TO KLEBSIELLA PNEUMONIAE: ICD-10-CM

## 2022-11-30 DIAGNOSIS — M86.172 ACUTE OSTEOMYELITIS OF LEFT ANKLE OR FOOT: ICD-10-CM

## 2022-11-30 DIAGNOSIS — M86.172 ACUTE OSTEOMYELITIS OF LEFT FOOT: Primary | ICD-10-CM

## 2022-11-30 DIAGNOSIS — M86.172 ACUTE OSTEOMYELITIS OF LEFT ANKLE OR FOOT: Primary | ICD-10-CM

## 2022-11-30 LAB
BASOPHILS # BLD AUTO: 0.04 10*3/MM3 (ref 0–0.2)
BASOPHILS NFR BLD AUTO: 0.5 % (ref 0–1.5)
DEPRECATED RDW RBC AUTO: 45.4 FL (ref 37–54)
EOSINOPHIL # BLD AUTO: 0.17 10*3/MM3 (ref 0–0.4)
EOSINOPHIL NFR BLD AUTO: 2.3 % (ref 0.3–6.2)
ERYTHROCYTE [DISTWIDTH] IN BLOOD BY AUTOMATED COUNT: 15.1 % (ref 12.3–15.4)
HCT VFR BLD AUTO: 27.3 % (ref 37.5–51)
HGB BLD-MCNC: 9 G/DL (ref 13–17.7)
IMM GRANULOCYTES # BLD AUTO: 0.02 10*3/MM3 (ref 0–0.05)
IMM GRANULOCYTES NFR BLD AUTO: 0.3 % (ref 0–0.5)
LYMPHOCYTES # BLD AUTO: 1.77 10*3/MM3 (ref 0.7–3.1)
LYMPHOCYTES NFR BLD AUTO: 23.8 % (ref 19.6–45.3)
MCH RBC QN AUTO: 27.9 PG (ref 26.6–33)
MCHC RBC AUTO-ENTMCNC: 33 G/DL (ref 31.5–35.7)
MCV RBC AUTO: 84.5 FL (ref 79–97)
MONOCYTES # BLD AUTO: 0.64 10*3/MM3 (ref 0.1–0.9)
MONOCYTES NFR BLD AUTO: 8.6 % (ref 5–12)
NEUTROPHILS NFR BLD AUTO: 4.8 10*3/MM3 (ref 1.7–7)
NEUTROPHILS NFR BLD AUTO: 64.5 % (ref 42.7–76)
NRBC BLD AUTO-RTO: 0.1 /100 WBC (ref 0–0.2)
PLATELET # BLD AUTO: 295 10*3/MM3 (ref 140–450)
PMV BLD AUTO: 11.3 FL (ref 6–12)
RBC # BLD AUTO: 3.23 10*6/MM3 (ref 4.14–5.8)
WBC NRBC COR # BLD: 7.44 10*3/MM3 (ref 3.4–10.8)

## 2022-11-30 PROCEDURE — 85025 COMPLETE CBC W/AUTO DIFF WBC: CPT

## 2022-11-30 PROCEDURE — S0260 H&P FOR SURGERY: HCPCS | Performed by: ORTHOPAEDIC SURGERY

## 2022-11-30 PROCEDURE — 36415 COLL VENOUS BLD VENIPUNCTURE: CPT

## 2022-11-30 PROCEDURE — 86140 C-REACTIVE PROTEIN: CPT

## 2022-11-30 NOTE — PROGRESS NOTES
[facility] [place] Orthopaedic Surgery Office Follow Up     Office Follow Up Visit     Date: 11/30/2022   Patient Name: Omkar Nava  MRN: [id number]  YOB: 1955  Chief Complaint:   Chief Complaint   Patient presents with   • Post-op     6 week s/p PARTIAL FIRST RAY AMPUTATION LEFT 10/18/22      History of Present Illness:   Omkar Nava is a 67 y.o. male who is here today for follow up following his left partial first ray amputation on October 18.  Patient now is approximately 6 weeks out from surgery.  Patient has been staying at a rehab facility which has been doing daily dressing changes over his surgical site.  Patient has neurocognitive disorder and is a poor historian.  History provided by patient's sister in clinic today.    Subjective   I reviewed the patient's chief complaint, history of present illness, review of systems, past medical history, surgical history, family history, social history, medications and allergy list   Objective    Vital Signs:   Vitals:    11/30/22 1110   Temp: 97.8 °F (36.6 °C)       Ortho Exam:  left LE: Leg compartments soft/compressible              Dressing removed for exam              Incision clean dry and intact proximally however the distal 2 cm in the incision have dehisced and the surrounding skin edges have necrosis showing exposed bone beneath, there is fibrinous tissue visible at the wound base with no visible purulence, see picture in chart, lesser toes are warm and well perfused, palpable DP pulse    Results Review:  No new imaging      Assessment / Plan    Assessment/Plan:   Diagnoses and all orders for this visit:    1. Acute osteomyelitis of left foot (HCC) (Primary)  -     Case Request    Patient has wound dehiscence with skin necrosis at the distal aspect of his surgical incision with exposed bone consistent with recurrence of osteomyelitis.  Recommend surgical management for removal of  infected/necrotic bone and tissue.  Discussed my recommendations with patient's family member.  Plan will be for patient to be scheduled for transmetatarsal amputation in the hospital on December 5.  Counseled family member to instruct rehab facility to continue wet-to-dry daily dressing changes over surgical site until surgery.    The risks and benefits of the procedure were discussed with the appropriate guardian, which include but are not limited to the risk of bleeding, recurrent infection, neurovascular damage, post-operative stiffness, need for further revision surgeries in the future, deep venous thrombosis, and general risks from anesthesia. We also discussed the post-operative rehabilitation and the overall expected outcomes from the procedure. We allowed proper time and answered the patient's questions regarding the procedure. Knowing what the risks are and what the conservative treatment is, the patient's guardian elected to forgo any further conservative treatment options and proceed with the surgical intervention.    Follow Up:   Return for F/U after Surgery.      Yeison Petty MD  [facility] [place] Orthopedic Surgeon

## 2022-12-01 LAB — CRP SERPL-MCNC: 0.88 MG/DL (ref 0–0.5)

## 2022-12-04 ENCOUNTER — ANESTHESIA EVENT (OUTPATIENT)
Dept: PERIOP | Facility: HOSPITAL | Age: 67
End: 2022-12-04

## 2022-12-04 RX ORDER — FAMOTIDINE 10 MG/ML
20 INJECTION, SOLUTION INTRAVENOUS ONCE
Status: CANCELLED | OUTPATIENT
Start: 2022-12-04 | End: 2022-12-04

## 2022-12-05 ENCOUNTER — HOSPITAL ENCOUNTER (INPATIENT)
Facility: HOSPITAL | Age: 67
LOS: 8 days | Discharge: NURSING, REHAB OR LONG-TERM CARE | End: 2022-12-13
Attending: ORTHOPAEDIC SURGERY | Admitting: FAMILY MEDICINE

## 2022-12-05 ENCOUNTER — ANESTHESIA (OUTPATIENT)
Dept: PERIOP | Facility: HOSPITAL | Age: 67
End: 2022-12-05

## 2022-12-05 ENCOUNTER — ANESTHESIA EVENT CONVERTED (OUTPATIENT)
Dept: ANESTHESIOLOGY | Facility: HOSPITAL | Age: 67
End: 2022-12-05

## 2022-12-05 DIAGNOSIS — M86.172 ACUTE OSTEOMYELITIS OF LEFT FOOT: ICD-10-CM

## 2022-12-05 LAB
ANION GAP SERPL CALCULATED.3IONS-SCNC: 8 MMOL/L (ref 5–15)
BUN SERPL-MCNC: 22 MG/DL (ref 8–23)
BUN/CREAT SERPL: 21.4 (ref 7–25)
CALCIUM SPEC-SCNC: 9.8 MG/DL (ref 8.6–10.5)
CHLORIDE SERPL-SCNC: 105 MMOL/L (ref 98–107)
CO2 SERPL-SCNC: 28 MMOL/L (ref 22–29)
CREAT SERPL-MCNC: 1.03 MG/DL (ref 0.76–1.27)
DEPRECATED RDW RBC AUTO: 45.8 FL (ref 37–54)
EGFRCR SERPLBLD CKD-EPI 2021: 80.6 ML/MIN/1.73
ERYTHROCYTE [DISTWIDTH] IN BLOOD BY AUTOMATED COUNT: 14.4 % (ref 12.3–15.4)
GLUCOSE BLDC GLUCOMTR-MCNC: 115 MG/DL (ref 70–130)
GLUCOSE SERPL-MCNC: 167 MG/DL (ref 65–99)
HCT VFR BLD AUTO: 30.4 % (ref 37.5–51)
HGB BLD-MCNC: 9.8 G/DL (ref 13–17.7)
MCH RBC QN AUTO: 27.9 PG (ref 26.6–33)
MCHC RBC AUTO-ENTMCNC: 32.2 G/DL (ref 31.5–35.7)
MCV RBC AUTO: 86.6 FL (ref 79–97)
PLATELET # BLD AUTO: 333 10*3/MM3 (ref 140–450)
PMV BLD AUTO: 10.2 FL (ref 6–12)
POTASSIUM SERPL-SCNC: 4.3 MMOL/L (ref 3.5–5.2)
RBC # BLD AUTO: 3.51 10*6/MM3 (ref 4.14–5.8)
SODIUM SERPL-SCNC: 141 MMOL/L (ref 136–145)
WBC NRBC COR # BLD: 8.63 10*3/MM3 (ref 3.4–10.8)

## 2022-12-05 PROCEDURE — 80048 BASIC METABOLIC PNL TOTAL CA: CPT | Performed by: ORTHOPAEDIC SURGERY

## 2022-12-05 PROCEDURE — 87070 CULTURE OTHR SPECIMN AEROBIC: CPT | Performed by: ORTHOPAEDIC SURGERY

## 2022-12-05 PROCEDURE — 87077 CULTURE AEROBIC IDENTIFY: CPT

## 2022-12-05 PROCEDURE — 87116 MYCOBACTERIA CULTURE: CPT | Performed by: ORTHOPAEDIC SURGERY

## 2022-12-05 PROCEDURE — 28805 AMPUTATION THRU METATARSAL: CPT | Performed by: ORTHOPAEDIC SURGERY

## 2022-12-05 PROCEDURE — 87186 SC STD MICRODIL/AGAR DIL: CPT

## 2022-12-05 PROCEDURE — 25010000002 DIAZEPAM PER 5 MG: Performed by: ORTHOPAEDIC SURGERY

## 2022-12-05 PROCEDURE — 25010000002 HYDROMORPHONE PER 4 MG: Performed by: ORTHOPAEDIC SURGERY

## 2022-12-05 PROCEDURE — 25010000002 PROPOFOL 10 MG/ML EMULSION: Performed by: NURSE ANESTHETIST, CERTIFIED REGISTERED

## 2022-12-05 PROCEDURE — 87176 TISSUE HOMOGENIZATION CULTR: CPT | Performed by: ORTHOPAEDIC SURGERY

## 2022-12-05 PROCEDURE — 0Y6N0ZB DETACHMENT AT LEFT FOOT, PARTIAL 2ND RAY, OPEN APPROACH: ICD-10-PCS | Performed by: ORTHOPAEDIC SURGERY

## 2022-12-05 PROCEDURE — 0Y6N0ZF DETACHMENT AT LEFT FOOT, PARTIAL 5TH RAY, OPEN APPROACH: ICD-10-PCS | Performed by: ORTHOPAEDIC SURGERY

## 2022-12-05 PROCEDURE — 0Y6N0ZD DETACHMENT AT LEFT FOOT, PARTIAL 4TH RAY, OPEN APPROACH: ICD-10-PCS | Performed by: ORTHOPAEDIC SURGERY

## 2022-12-05 PROCEDURE — 88307 TISSUE EXAM BY PATHOLOGIST: CPT | Performed by: ORTHOPAEDIC SURGERY

## 2022-12-05 PROCEDURE — 88311 DECALCIFY TISSUE: CPT | Performed by: ORTHOPAEDIC SURGERY

## 2022-12-05 PROCEDURE — 87102 FUNGUS ISOLATION CULTURE: CPT | Performed by: ORTHOPAEDIC SURGERY

## 2022-12-05 PROCEDURE — 87206 SMEAR FLUORESCENT/ACID STAI: CPT | Performed by: ORTHOPAEDIC SURGERY

## 2022-12-05 PROCEDURE — 85027 COMPLETE CBC AUTOMATED: CPT | Performed by: ORTHOPAEDIC SURGERY

## 2022-12-05 PROCEDURE — 87075 CULTR BACTERIA EXCEPT BLOOD: CPT | Performed by: ORTHOPAEDIC SURGERY

## 2022-12-05 PROCEDURE — 25010000002 FENTANYL CITRATE (PF) 50 MCG/ML SOLUTION

## 2022-12-05 PROCEDURE — 0Y6N0ZC DETACHMENT AT LEFT FOOT, PARTIAL 3RD RAY, OPEN APPROACH: ICD-10-PCS | Performed by: ORTHOPAEDIC SURGERY

## 2022-12-05 PROCEDURE — 25010000002 ONDANSETRON PER 1 MG: Performed by: ANESTHESIOLOGY

## 2022-12-05 PROCEDURE — 25010000002 DEXAMETHASONE PER 1 MG: Performed by: NURSE ANESTHETIST, CERTIFIED REGISTERED

## 2022-12-05 PROCEDURE — 87186 SC STD MICRODIL/AGAR DIL: CPT | Performed by: ORTHOPAEDIC SURGERY

## 2022-12-05 PROCEDURE — 82962 GLUCOSE BLOOD TEST: CPT

## 2022-12-05 PROCEDURE — 25010000002 VANCOMYCIN 10 G RECONSTITUTED SOLUTION: Performed by: ORTHOPAEDIC SURGERY

## 2022-12-05 PROCEDURE — 87205 SMEAR GRAM STAIN: CPT | Performed by: ORTHOPAEDIC SURGERY

## 2022-12-05 RX ORDER — SODIUM CHLORIDE 9 MG/ML
40 INJECTION, SOLUTION INTRAVENOUS AS NEEDED
Status: DISCONTINUED | OUTPATIENT
Start: 2022-12-05 | End: 2022-12-13 | Stop reason: HOSPADM

## 2022-12-05 RX ORDER — ACETAMINOPHEN 325 MG/1
650 TABLET ORAL EVERY 4 HOURS PRN
Status: DISCONTINUED | OUTPATIENT
Start: 2022-12-05 | End: 2022-12-13 | Stop reason: HOSPADM

## 2022-12-05 RX ORDER — ROCURONIUM BROMIDE 10 MG/ML
INJECTION, SOLUTION INTRAVENOUS AS NEEDED
Status: DISCONTINUED | OUTPATIENT
Start: 2022-12-05 | End: 2022-12-05 | Stop reason: SURG

## 2022-12-05 RX ORDER — SODIUM CHLORIDE 0.9 % (FLUSH) 0.9 %
10 SYRINGE (ML) INJECTION AS NEEDED
Status: DISCONTINUED | OUTPATIENT
Start: 2022-12-05 | End: 2022-12-13 | Stop reason: HOSPADM

## 2022-12-05 RX ORDER — SODIUM CHLORIDE 0.9 % (FLUSH) 0.9 %
10 SYRINGE (ML) INJECTION EVERY 12 HOURS SCHEDULED
Status: DISCONTINUED | OUTPATIENT
Start: 2022-12-05 | End: 2022-12-13 | Stop reason: HOSPADM

## 2022-12-05 RX ORDER — DIAZEPAM 5 MG/ML
5 INJECTION, SOLUTION INTRAMUSCULAR; INTRAVENOUS ONCE
Status: COMPLETED | OUTPATIENT
Start: 2022-12-05 | End: 2022-12-05

## 2022-12-05 RX ORDER — LIDOCAINE HYDROCHLORIDE 10 MG/ML
INJECTION, SOLUTION EPIDURAL; INFILTRATION; INTRACAUDAL; PERINEURAL AS NEEDED
Status: DISCONTINUED | OUTPATIENT
Start: 2022-12-05 | End: 2022-12-05 | Stop reason: SURG

## 2022-12-05 RX ORDER — EPHEDRINE SULFATE 50 MG/ML
5 INJECTION, SOLUTION INTRAVENOUS ONCE AS NEEDED
Status: DISCONTINUED | OUTPATIENT
Start: 2022-12-05 | End: 2022-12-05 | Stop reason: HOSPADM

## 2022-12-05 RX ORDER — PROPOFOL 10 MG/ML
VIAL (ML) INTRAVENOUS AS NEEDED
Status: DISCONTINUED | OUTPATIENT
Start: 2022-12-05 | End: 2022-12-05 | Stop reason: SURG

## 2022-12-05 RX ORDER — INSULIN LISPRO 100 [IU]/ML
8 INJECTION, SOLUTION INTRAVENOUS; SUBCUTANEOUS
COMMUNITY

## 2022-12-05 RX ORDER — ONDANSETRON 2 MG/ML
INJECTION INTRAMUSCULAR; INTRAVENOUS AS NEEDED
Status: DISCONTINUED | OUTPATIENT
Start: 2022-12-05 | End: 2022-12-05 | Stop reason: SURG

## 2022-12-05 RX ORDER — MAGNESIUM HYDROXIDE 1200 MG/15ML
LIQUID ORAL AS NEEDED
Status: DISCONTINUED | OUTPATIENT
Start: 2022-12-05 | End: 2022-12-05 | Stop reason: HOSPADM

## 2022-12-05 RX ORDER — MIDAZOLAM HYDROCHLORIDE 1 MG/ML
1 INJECTION INTRAMUSCULAR; INTRAVENOUS
Status: DISCONTINUED | OUTPATIENT
Start: 2022-12-05 | End: 2022-12-05 | Stop reason: SDUPTHER

## 2022-12-05 RX ORDER — CHOLECALCIFEROL (VITAMIN D3) 125 MCG
5 CAPSULE ORAL NIGHTLY
Status: DISCONTINUED | OUTPATIENT
Start: 2022-12-05 | End: 2022-12-13 | Stop reason: HOSPADM

## 2022-12-05 RX ORDER — HALOPERIDOL 2 MG/ML
2 SOLUTION ORAL DAILY PRN
Status: DISCONTINUED | OUTPATIENT
Start: 2022-12-05 | End: 2022-12-13

## 2022-12-05 RX ORDER — OXYCODONE HYDROCHLORIDE 5 MG/1
5 TABLET ORAL EVERY 4 HOURS PRN
Status: DISPENSED | OUTPATIENT
Start: 2022-12-05 | End: 2022-12-12

## 2022-12-05 RX ORDER — SODIUM CHLORIDE 9 MG/ML
100 INJECTION, SOLUTION INTRAVENOUS CONTINUOUS
Status: DISCONTINUED | OUTPATIENT
Start: 2022-12-05 | End: 2022-12-07

## 2022-12-05 RX ORDER — POLYETHYLENE GLYCOL 3350 17 G/17G
17 POWDER, FOR SOLUTION ORAL AS NEEDED
Status: DISCONTINUED | OUTPATIENT
Start: 2022-12-05 | End: 2022-12-13 | Stop reason: HOSPADM

## 2022-12-05 RX ORDER — AMLODIPINE BESYLATE AND BENAZEPRIL HYDROCHLORIDE 10; 20 MG/1; MG/1
1 CAPSULE ORAL DAILY
Status: ON HOLD | COMMUNITY
End: 2022-12-06

## 2022-12-05 RX ORDER — FENTANYL CITRATE 50 UG/ML
50 INJECTION, SOLUTION INTRAMUSCULAR; INTRAVENOUS
Status: DISCONTINUED | OUTPATIENT
Start: 2022-12-05 | End: 2022-12-05 | Stop reason: HOSPADM

## 2022-12-05 RX ORDER — NALOXONE HCL 0.4 MG/ML
0.4 VIAL (ML) INJECTION AS NEEDED
Status: DISCONTINUED | OUTPATIENT
Start: 2022-12-05 | End: 2022-12-05 | Stop reason: HOSPADM

## 2022-12-05 RX ORDER — HYDROMORPHONE HYDROCHLORIDE 1 MG/ML
0.5 INJECTION, SOLUTION INTRAMUSCULAR; INTRAVENOUS; SUBCUTANEOUS
Status: DISPENSED | OUTPATIENT
Start: 2022-12-05 | End: 2022-12-12

## 2022-12-05 RX ORDER — SODIUM CHLORIDE 0.9 % (FLUSH) 0.9 %
10 SYRINGE (ML) INJECTION AS NEEDED
Status: DISCONTINUED | OUTPATIENT
Start: 2022-12-05 | End: 2022-12-05 | Stop reason: HOSPADM

## 2022-12-05 RX ORDER — BISACODYL 10 MG
10 SUPPOSITORY, RECTAL RECTAL DAILY PRN
Status: DISCONTINUED | OUTPATIENT
Start: 2022-12-05 | End: 2022-12-13 | Stop reason: HOSPADM

## 2022-12-05 RX ORDER — LISINOPRIL 20 MG/1
20 TABLET ORAL
Status: DISCONTINUED | OUTPATIENT
Start: 2022-12-06 | End: 2022-12-07

## 2022-12-05 RX ORDER — SODIUM CHLORIDE, SODIUM LACTATE, POTASSIUM CHLORIDE, CALCIUM CHLORIDE 600; 310; 30; 20 MG/100ML; MG/100ML; MG/100ML; MG/100ML
100 INJECTION, SOLUTION INTRAVENOUS CONTINUOUS
Status: DISCONTINUED | OUTPATIENT
Start: 2022-12-05 | End: 2022-12-07

## 2022-12-05 RX ORDER — LORAZEPAM 1 MG/1
2 TABLET ORAL EVERY 6 HOURS PRN
Status: DISCONTINUED | OUTPATIENT
Start: 2022-12-05 | End: 2022-12-13 | Stop reason: HOSPADM

## 2022-12-05 RX ORDER — AMOXICILLIN 250 MG
2 CAPSULE ORAL NIGHTLY
Status: DISCONTINUED | OUTPATIENT
Start: 2022-12-05 | End: 2022-12-13 | Stop reason: HOSPADM

## 2022-12-05 RX ORDER — SODIUM CHLORIDE 0.9 % (FLUSH) 0.9 %
10 SYRINGE (ML) INJECTION EVERY 12 HOURS SCHEDULED
Status: DISCONTINUED | OUTPATIENT
Start: 2022-12-05 | End: 2022-12-05 | Stop reason: HOSPADM

## 2022-12-05 RX ORDER — EPHEDRINE SULFATE 50 MG/ML
INJECTION, SOLUTION INTRAVENOUS AS NEEDED
Status: DISCONTINUED | OUTPATIENT
Start: 2022-12-05 | End: 2022-12-05 | Stop reason: SURG

## 2022-12-05 RX ORDER — OLANZAPINE 5 MG/1
5 TABLET ORAL 2 TIMES DAILY
Status: DISCONTINUED | OUTPATIENT
Start: 2022-12-05 | End: 2022-12-13 | Stop reason: HOSPADM

## 2022-12-05 RX ORDER — CEFAZOLIN SODIUM 2 G/100ML
2 INJECTION, SOLUTION INTRAVENOUS EVERY 8 HOURS
Status: COMPLETED | OUTPATIENT
Start: 2022-12-06 | End: 2022-12-06

## 2022-12-05 RX ORDER — ONDANSETRON 2 MG/ML
4 INJECTION INTRAMUSCULAR; INTRAVENOUS EVERY 6 HOURS PRN
Status: DISCONTINUED | OUTPATIENT
Start: 2022-12-05 | End: 2022-12-13 | Stop reason: HOSPADM

## 2022-12-05 RX ORDER — INSULIN GLARGINE 100 [IU]/ML
10 INJECTION, SOLUTION SUBCUTANEOUS NIGHTLY
COMMUNITY

## 2022-12-05 RX ORDER — PHENYLEPHRINE HCL IN 0.9% NACL 1 MG/10 ML
SYRINGE (ML) INTRAVENOUS AS NEEDED
Status: DISCONTINUED | OUTPATIENT
Start: 2022-12-05 | End: 2022-12-05 | Stop reason: SURG

## 2022-12-05 RX ORDER — HALOPERIDOL 2 MG/ML
SOLUTION ORAL DAILY PRN
Status: ON HOLD | COMMUNITY
End: 2022-12-06

## 2022-12-05 RX ORDER — SODIUM CHLORIDE, SODIUM LACTATE, POTASSIUM CHLORIDE, CALCIUM CHLORIDE 600; 310; 30; 20 MG/100ML; MG/100ML; MG/100ML; MG/100ML
9 INJECTION, SOLUTION INTRAVENOUS CONTINUOUS
Status: DISCONTINUED | OUTPATIENT
Start: 2022-12-05 | End: 2022-12-12

## 2022-12-05 RX ORDER — MIDAZOLAM HYDROCHLORIDE 1 MG/ML
0.5 INJECTION INTRAMUSCULAR; INTRAVENOUS
Status: DISCONTINUED | OUTPATIENT
Start: 2022-12-05 | End: 2022-12-05 | Stop reason: HOSPADM

## 2022-12-05 RX ORDER — SODIUM CHLORIDE 9 MG/ML
40 INJECTION, SOLUTION INTRAVENOUS AS NEEDED
Status: DISCONTINUED | OUTPATIENT
Start: 2022-12-05 | End: 2022-12-05 | Stop reason: HOSPADM

## 2022-12-05 RX ORDER — DEXAMETHASONE SODIUM PHOSPHATE 4 MG/ML
INJECTION, SOLUTION INTRA-ARTICULAR; INTRALESIONAL; INTRAMUSCULAR; INTRAVENOUS; SOFT TISSUE AS NEEDED
Status: DISCONTINUED | OUTPATIENT
Start: 2022-12-05 | End: 2022-12-05 | Stop reason: SURG

## 2022-12-05 RX ORDER — ACETAMINOPHEN 650 MG/1
650 SUPPOSITORY RECTAL EVERY 4 HOURS PRN
Status: DISCONTINUED | OUTPATIENT
Start: 2022-12-05 | End: 2022-12-13 | Stop reason: HOSPADM

## 2022-12-05 RX ORDER — OXYCODONE HYDROCHLORIDE AND ACETAMINOPHEN 5; 325 MG/1; MG/1
1 TABLET ORAL EVERY 6 HOURS PRN
Status: ON HOLD | COMMUNITY
End: 2022-12-06

## 2022-12-05 RX ORDER — AMLODIPINE BESYLATE 10 MG/1
10 TABLET ORAL
Status: DISCONTINUED | OUTPATIENT
Start: 2022-12-06 | End: 2022-12-13 | Stop reason: HOSPADM

## 2022-12-05 RX ORDER — NALOXONE HCL 0.4 MG/ML
0.1 VIAL (ML) INJECTION
Status: DISCONTINUED | OUTPATIENT
Start: 2022-12-05 | End: 2022-12-13 | Stop reason: HOSPADM

## 2022-12-05 RX ORDER — FENTANYL CITRATE 50 UG/ML
INJECTION, SOLUTION INTRAMUSCULAR; INTRAVENOUS
Status: COMPLETED
Start: 2022-12-05 | End: 2022-12-05

## 2022-12-05 RX ORDER — ONDANSETRON 2 MG/ML
4 INJECTION INTRAMUSCULAR; INTRAVENOUS ONCE AS NEEDED
Status: DISCONTINUED | OUTPATIENT
Start: 2022-12-05 | End: 2022-12-05 | Stop reason: HOSPADM

## 2022-12-05 RX ORDER — NIFEDIPINE 30 MG/1
30 TABLET, EXTENDED RELEASE ORAL
Status: DISCONTINUED | OUTPATIENT
Start: 2022-12-06 | End: 2022-12-06 | Stop reason: ALTCHOICE

## 2022-12-05 RX ORDER — ASCORBIC ACID 500 MG
500 TABLET ORAL 2 TIMES DAILY
COMMUNITY

## 2022-12-05 RX ORDER — LORAZEPAM 2 MG/1
2 TABLET ORAL EVERY 6 HOURS PRN
COMMUNITY

## 2022-12-05 RX ORDER — FAMOTIDINE 20 MG/1
20 TABLET, FILM COATED ORAL ONCE
Status: COMPLETED | OUTPATIENT
Start: 2022-12-05 | End: 2022-12-05

## 2022-12-05 RX ORDER — LISINOPRIL 40 MG/1
40 TABLET ORAL DAILY
Status: DISCONTINUED | OUTPATIENT
Start: 2022-12-05 | End: 2022-12-13 | Stop reason: HOSPADM

## 2022-12-05 RX ORDER — CHOLECALCIFEROL (VITAMIN D3) 125 MCG
5 CAPSULE ORAL NIGHTLY PRN
Status: DISCONTINUED | OUTPATIENT
Start: 2022-12-05 | End: 2022-12-13 | Stop reason: HOSPADM

## 2022-12-05 RX ORDER — ROPIVACAINE HYDROCHLORIDE 2 MG/ML
INJECTION, SOLUTION EPIDURAL; INFILTRATION; PERINEURAL CONTINUOUS
Status: DISCONTINUED | OUTPATIENT
Start: 2022-12-05 | End: 2022-12-05

## 2022-12-05 RX ORDER — LIDOCAINE HYDROCHLORIDE 10 MG/ML
0.5 INJECTION, SOLUTION EPIDURAL; INFILTRATION; INTRACAUDAL; PERINEURAL ONCE AS NEEDED
Status: DISCONTINUED | OUTPATIENT
Start: 2022-12-05 | End: 2022-12-05 | Stop reason: HOSPADM

## 2022-12-05 RX ORDER — ONDANSETRON 4 MG/1
4 TABLET, FILM COATED ORAL EVERY 6 HOURS PRN
Status: DISCONTINUED | OUTPATIENT
Start: 2022-12-05 | End: 2022-12-13 | Stop reason: HOSPADM

## 2022-12-05 RX ORDER — NICOTINE POLACRILEX 4 MG
15 LOZENGE BUCCAL
Status: DISCONTINUED | OUTPATIENT
Start: 2022-12-05 | End: 2022-12-06 | Stop reason: SDUPTHER

## 2022-12-05 RX ORDER — DEXTROSE MONOHYDRATE 25 G/50ML
25 INJECTION, SOLUTION INTRAVENOUS
Status: DISCONTINUED | OUTPATIENT
Start: 2022-12-05 | End: 2022-12-06 | Stop reason: SDUPTHER

## 2022-12-05 RX ADMIN — SUGAMMADEX 200 MG: 100 INJECTION, SOLUTION INTRAVENOUS at 18:08

## 2022-12-05 RX ADMIN — FAMOTIDINE 20 MG: 20 TABLET ORAL at 12:30

## 2022-12-05 RX ADMIN — Medication 100 MCG: at 17:30

## 2022-12-05 RX ADMIN — SODIUM CHLORIDE, POTASSIUM CHLORIDE, SODIUM LACTATE AND CALCIUM CHLORIDE 100 ML/HR: 600; 310; 30; 20 INJECTION, SOLUTION INTRAVENOUS at 20:53

## 2022-12-05 RX ADMIN — FENTANYL CITRATE 50 MCG: 50 INJECTION, SOLUTION INTRAMUSCULAR; INTRAVENOUS at 19:05

## 2022-12-05 RX ADMIN — LIDOCAINE HYDROCHLORIDE 50 MG: 10 INJECTION, SOLUTION EPIDURAL; INFILTRATION; INTRACAUDAL; PERINEURAL at 17:04

## 2022-12-05 RX ADMIN — PROPOFOL 25 MCG/KG/MIN: 10 INJECTION, EMULSION INTRAVENOUS at 17:07

## 2022-12-05 RX ADMIN — HYDROMORPHONE HYDROCHLORIDE 0.5 MG: 1 INJECTION, SOLUTION INTRAMUSCULAR; INTRAVENOUS; SUBCUTANEOUS at 22:56

## 2022-12-05 RX ADMIN — DEXAMETHASONE SODIUM PHOSPHATE 4 MG: 4 INJECTION, SOLUTION INTRAMUSCULAR; INTRAVENOUS at 17:07

## 2022-12-05 RX ADMIN — CEFAZOLIN SODIUM 2 G: 2 INJECTION, SOLUTION INTRAVENOUS at 23:00

## 2022-12-05 RX ADMIN — ROCURONIUM BROMIDE 50 MG: 10 INJECTION, SOLUTION INTRAVENOUS at 17:04

## 2022-12-05 RX ADMIN — Medication 10 ML: at 22:26

## 2022-12-05 RX ADMIN — ONDANSETRON 4 MG: 2 INJECTION INTRAMUSCULAR; INTRAVENOUS at 18:09

## 2022-12-05 RX ADMIN — DIAZEPAM 5 MG: 10 INJECTION, SOLUTION INTRAMUSCULAR; INTRAVENOUS at 22:19

## 2022-12-05 RX ADMIN — PROPOFOL 200 MG: 10 INJECTION, EMULSION INTRAVENOUS at 17:04

## 2022-12-05 RX ADMIN — SODIUM CHLORIDE 100 ML/HR: 9 INJECTION, SOLUTION INTRAVENOUS at 22:21

## 2022-12-05 RX ADMIN — EPHEDRINE SULFATE 10 MG: 50 INJECTION INTRAVENOUS at 17:21

## 2022-12-05 RX ADMIN — VANCOMYCIN HYDROCHLORIDE 1250 MG: 10 INJECTION, POWDER, LYOPHILIZED, FOR SOLUTION INTRAVENOUS at 16:51

## 2022-12-05 RX ADMIN — SODIUM CHLORIDE, POTASSIUM CHLORIDE, SODIUM LACTATE AND CALCIUM CHLORIDE: 600; 310; 30; 20 INJECTION, SOLUTION INTRAVENOUS at 16:57

## 2022-12-05 NOTE — INTERVAL H&P NOTE
[facility] Pre-op    Full history and physical note from office is attached.    VS: /59  HR 72  RR 16  T 97.0 Sat 99%RA      LAB Results:  Lab Results   Component Value Date    WBC 8.63 12/05/2022    HGB 9.8 (L) 12/05/2022    HCT 30.4 (L) 12/05/2022    MCV 86.6 12/05/2022     12/05/2022    NEUTROABS 4.80 11/30/2022    GLUCOSE 141 (H) 11/15/2022    BUN 16 11/15/2022    CREATININE 1.09 11/15/2022    EGFRIFNONA >60 07/25/2022    EGFRIFAFRI >60 07/25/2022     11/15/2022    K 4.0 11/15/2022     (H) 11/15/2022    CO2 23.0 11/15/2022    MG 2.0 10/21/2022    CALCIUM 9.1 11/15/2022    ALBUMIN 3.20 (L) 11/15/2022    AST 14 11/15/2022    ALT 17 11/15/2022    BILITOT <0.2 11/15/2022    PTT 48.7 (H) 10/15/2022    INR 1.08 10/18/2022       Cancer Staging (if applicable)  Cancer Patient: __ yes __no __unknown__N/A; If yes, clinical stage T:__ N:__M:__, stage group or __N/A      Impression: Acute osteomyelitis of left foot      Plan: AMPUTATION FOOT- left      NARGIS Carter   12/5/2022   11:50 EST

## 2022-12-05 NOTE — ANESTHESIA PROCEDURE NOTES
Airway  Urgency: elective    Date/Time: 12/5/2022 5:05 PM  Airway not difficult    General Information and Staff    Patient location during procedure: OR  SRNA: Darrin Luz SRNA  Indications and Patient Condition  Indications for airway management: airway protection    Preoxygenated: yes  MILS not maintained throughout  Mask difficulty assessment: 1 - vent by mask    Final Airway Details  Final airway type: endotracheal airway      Successful airway: ETT  Cuffed: yes   Successful intubation technique: direct laryngoscopy  Endotracheal tube insertion site: oral  Blade: Jaxon  Blade size: 3  ETT size (mm): 7.5  Cormack-Lehane Classification: grade I - full view of glottis  Placement verified by: chest auscultation and capnometry   Measured from: lips  ETT/EBT  to lips (cm): 20  Number of attempts at approach: 2  Assessment: lips, teeth, and gum same as pre-op and atraumatic intubation    Additional Comments  Negative epigastric sounds, Breath sound equal bilaterally with symmetric chest rise and fall

## 2022-12-05 NOTE — ANESTHESIA PROCEDURE NOTES
Popliteal Catheter      Patient reassessed immediately prior to procedure    Patient location during procedure: pre-op  Reason for block: at surgeon's request and post-op pain management  Performed by  CRNA/CAA: Alberto Mills, CRNA  Assisted by: Delia Black RN  Preanesthetic Checklist  Completed: patient identified, IV checked, site marked, risks and benefits discussed, surgical consent, monitors and equipment checked, pre-op evaluation and timeout performed  Prep:  Pt Position: right lateral decubitus  Sterile barriers:cap, gloves, mask and washed/disinfected hands  Prep: ChloraPrep  Patient monitoring: blood pressure monitoring, continuous pulse oximetry and EKG  Procedure    Sedation: yes  Performed under: local infiltration  Guidance:ultrasound guided    ULTRASOUND INTERPRETATION.  Using ultrasound guidance a 20 G gauge needle was placed in close proximity to the nerve, at which point, under ultrasound guidance anesthetic was injected in the area of the nerve and spread of the anesthesia was seen on ultrasound in close proximity thereto.  There were no abnormalities seen on ultrasound; a digital image was taken; and the patient tolerated the procedure with no complications. Images:still images obtained, printed/placed on chart    Laterality:left  Block Type:popliteal  Injection Technique:catheter  Needle Type:echogenic and Tuohy  Needle Gauge:18 G  Resistance on Injection: none  Catheter Size:20 G  Cath Depth at skin: 9 cm          Medications  Preservative Free Saline:5ml    Post Assessment  Injection Assessment: negative aspiration for heme, no paresthesia on injection and incremental injection  Patient Tolerance:comfortable throughout block  Complications:no  Additional Notes    CATHETER                               A high-frequency linear transducer, with sterile cover, was placed in the popliteal fossa to identify the popliteal artery and vein, Tibial nerve (TN) and Common Peroneal nerve (CP). The  "transducer was then moved in a cephalad fashion to observe the TN and CP nerve bifurcation to form the Sciatic Nerve. The insertion site was prepped and draped in sterile fashion. Skin and cutaneous tissue was infiltrated with 2-5 ml of 1% Lidocaine. Using ultrasound-guidance, an 18-gauge Contiplex Ultra 360 Touhy needle was advanced in plane from lateral to medial. Preservative-free normal saline was utilized for hydro-dissection of tissue, advancement of Touhy needle, and to confirm final needle placement posterior to the nerves. Local anesthetic injection spread, in incremental 3-5 ml injections, to surround both nerve structures. Aspiration every 5 ml to prevent intravascular injection. Injection was completed with negative aspiration of blood and negative intravascular injection. Injection pressures were normal with minimal resistance. A 20-gauge Contiplex Echo catheter was placed through the needle and advance out the tip of the Touhy 1-3 cm. The Touhy needle was then removed, and final catheter position verified (Below/above or Anterior/Posterior) the nerve structures. The catheter was secured in the usual fashion with skin glue, benzoin, steri-strips, CHG tegaderm and Label noting \"Nerve Block Catheter\". Jerk tape applied at yellow connector and catheter connection.            "

## 2022-12-05 NOTE — ANESTHESIA PROCEDURE NOTES
Adductor Canal Single Shot      Patient reassessed immediately prior to procedure    Reason for block: at surgeon's request and post-op pain management  Performed by  CRNA/CAA: Alberto Mills, DWAIN  Assisted by: Delia Black RN  Preanesthetic Checklist  Completed: patient identified, IV checked, site marked, risks and benefits discussed, surgical consent, monitors and equipment checked, pre-op evaluation and timeout performed  Prep:  Pt Position: supine  Sterile barriers:cap, gloves, mask, sterile barriers and washed/disinfected hands  Prep: ChloraPrep  Patient monitoring: blood pressure monitoring, continuous pulse oximetry and EKG  Procedure    Sedation: no  Performed under: spinal  Guidance:ultrasound guided    ULTRASOUND INTERPRETATION.  Using ultrasound guidance a 20 G gauge needle was placed in close proximity to the nerve, at which point, under ultrasound guidance anesthetic was injected in the area of the nerve and spread of the anesthesia was seen on ultrasound in close proximity thereto.  There were no abnormalities seen on ultrasound; a digital image was taken; and the patient tolerated the procedure with no complications. Images:still images obtained, printed/placed on chart    Laterality:left  Block Type:adductor canal block  Injection Technique:single-shot  Needle Type:echogenic and short-bevel  Needle Gauge:20 G  Resistance on Injection: none  Catheter size: 20g.          Medications  Preservative Free Saline:5ml    Post Assessment  Injection Assessment: negative aspiration for heme, incremental injection and no paresthesia on injection  Patient Tolerance:comfortable throughout block  Complications:no  Additional Notes  SINGLE shot   A high-frequency linear transducer, with sterile cover, was placed on the anterior mid-thigh (between the anterior superior iliac spine and patella). The transducer was then moved medially to identify the Sartorius muscle ([place]), Vastus Medialis muscle (VMM),  "Superficial Femoral Artery (SFA) and Vein. The transducer was then moved cephalad or caudad to position the SFA in the middle of the [place]. The insertion site was prepped and draped in sterile fashion. Skin and cutaneous tissue was infiltrated with 2-5 ml of 1% Lidocaine. Using ultrasound-guidance, a 20-gauge B-Rivera 4\" Ultraplex 360 non-stimulating echogenic needle was advanced in plane from lateral to medial. Preservative-free normal saline was utilized for hydro-dissection of tissue, advancement of needle, and to confirm needle placement below the fascial plane of the [place] where the Nerve to the VMM is located. Local anesthetic (LA) 5 ml deposited here. The needle continues its path lateral to the SFA at the level of the Saphenous Nerve. The remainder of the LA was deposited at the 10-11 o'clock position of the SFA. This injection created a space between the [place] and the SFA. Aspiration every 5 ml to prevent intravascular injection. Injection was completed with negative aspiration of blood and negative intravascular injection. Injection pressures were normal with minimal resistance.           "

## 2022-12-05 NOTE — ANESTHESIA PREPROCEDURE EVALUATION
Anesthesia Evaluation     Patient summary reviewed and Nursing notes reviewed                Airway   Mallampati: II  TM distance: >3 FB  Neck ROM: full  No difficulty expected  Dental - normal exam     Pulmonary - normal exam   (+) a smoker Former,   Cardiovascular - normal exam    (+) hypertension,       Neuro/Psych  (+) psychiatric history,    GI/Hepatic/Renal/Endo    (+)  GERD,  diabetes mellitus,     Musculoskeletal (-) negative ROS        ROS comment: LEFT FOOT OSTEOMYELITIS  Abdominal  - normal exam    Bowel sounds: normal.   Substance History   (+) drug use (MARIJUANA)     OB/GYN negative ob/gyn ROS         Other                        Anesthesia Plan    ASA 3     general     (SS POPLITEAL/ADDUCTOR FOR POSTOP PAIN)  intravenous induction     Anesthetic plan, risks, benefits, and alternatives have been provided, discussed and informed consent has been obtained with: patient.    Plan discussed with CRNA.        CODE STATUS:

## 2022-12-05 NOTE — ANESTHESIA POSTPROCEDURE EVALUATION
Patient: Omkar Nava    Procedure Summary     Date: 12/05/22 [facility] / [facility]    Anesthesia Start: 1657 Anesthesia Stop: 1853    Procedure: Transmetatarsal of Left Foot (Left: Foot) Diagnosis:       Acute osteomyelitis of left foot (HCC)      (Acute osteomyelitis of left foot (HCC) [M86.172])    Surgeons: Yeison Petty MD Provider: Bryn Desouza MD    Anesthesia Type: general ASA Status: 3          Anesthesia Type: general    Vitals  Vitals Value Taken Time   /69 12/05/22 1851   Temp     Pulse 74 12/05/22 1853   Resp     SpO2 100 % 12/05/22 1853   Vitals shown include unvalidated device data.        Post Anesthesia Care and Evaluation    Patient location during evaluation: PACU  Patient participation: complete - patient participated  Level of consciousness: awake and alert  Pain management: adequate    Airway patency: patent  Anesthetic complications: No anesthetic complications  PONV Status: none  Cardiovascular status: hemodynamically stable and acceptable  Respiratory status: nonlabored ventilation, acceptable and nasal cannula  Hydration status: acceptable

## 2022-12-06 LAB
ANION GAP SERPL CALCULATED.3IONS-SCNC: 10 MMOL/L (ref 5–15)
BUN SERPL-MCNC: 20 MG/DL (ref 8–23)
BUN/CREAT SERPL: 19.4 (ref 7–25)
CALCIUM SPEC-SCNC: 8.9 MG/DL (ref 8.6–10.5)
CHLORIDE SERPL-SCNC: 103 MMOL/L (ref 98–107)
CK SERPL-CCNC: 424 U/L (ref 20–200)
CO2 SERPL-SCNC: 26 MMOL/L (ref 22–29)
CREAT SERPL-MCNC: 1.03 MG/DL (ref 0.76–1.27)
DEPRECATED RDW RBC AUTO: 44.5 FL (ref 37–54)
EGFRCR SERPLBLD CKD-EPI 2021: 80.6 ML/MIN/1.73
ERYTHROCYTE [DISTWIDTH] IN BLOOD BY AUTOMATED COUNT: 14.1 % (ref 12.3–15.4)
GLUCOSE BLDC GLUCOMTR-MCNC: 278 MG/DL (ref 70–130)
GLUCOSE BLDC GLUCOMTR-MCNC: 286 MG/DL (ref 70–130)
GLUCOSE BLDC GLUCOMTR-MCNC: 301 MG/DL (ref 70–130)
GLUCOSE SERPL-MCNC: 273 MG/DL (ref 65–99)
HCT VFR BLD AUTO: 25 % (ref 37.5–51)
HGB BLD-MCNC: 8.2 G/DL (ref 13–17.7)
MCH RBC QN AUTO: 28.3 PG (ref 26.6–33)
MCHC RBC AUTO-ENTMCNC: 32.8 G/DL (ref 31.5–35.7)
MCV RBC AUTO: 86.2 FL (ref 79–97)
PLATELET # BLD AUTO: 287 10*3/MM3 (ref 140–450)
PMV BLD AUTO: 10.5 FL (ref 6–12)
POTASSIUM SERPL-SCNC: 4.4 MMOL/L (ref 3.5–5.2)
RBC # BLD AUTO: 2.9 10*6/MM3 (ref 4.14–5.8)
SODIUM SERPL-SCNC: 139 MMOL/L (ref 136–145)
WBC NRBC COR # BLD: 11.88 10*3/MM3 (ref 3.4–10.8)

## 2022-12-06 PROCEDURE — 25010000002 HYDROMORPHONE PER 4 MG: Performed by: ORTHOPAEDIC SURGERY

## 2022-12-06 PROCEDURE — 82550 ASSAY OF CK (CPK): CPT | Performed by: INTERNAL MEDICINE

## 2022-12-06 PROCEDURE — 25010000002 VANCOMYCIN 10 G RECONSTITUTED SOLUTION

## 2022-12-06 PROCEDURE — 80048 BASIC METABOLIC PNL TOTAL CA: CPT | Performed by: PHYSICIAN ASSISTANT

## 2022-12-06 PROCEDURE — 25010000002 CEFAZOLIN IN DEXTROSE 2-4 GM/100ML-% SOLUTION: Performed by: ORTHOPAEDIC SURGERY

## 2022-12-06 PROCEDURE — 85027 COMPLETE CBC AUTOMATED: CPT | Performed by: ORTHOPAEDIC SURGERY

## 2022-12-06 PROCEDURE — 82962 GLUCOSE BLOOD TEST: CPT

## 2022-12-06 PROCEDURE — 99221 1ST HOSP IP/OBS SF/LOW 40: CPT | Performed by: PHYSICIAN ASSISTANT

## 2022-12-06 PROCEDURE — 25010000002 CEFEPIME PER 500 MG

## 2022-12-06 PROCEDURE — 63710000001 INSULIN LISPRO (HUMAN) PER 5 UNITS: Performed by: PHYSICIAN ASSISTANT

## 2022-12-06 PROCEDURE — 63710000001 INSULIN DETEMIR PER 5 UNITS: Performed by: INTERNAL MEDICINE

## 2022-12-06 PROCEDURE — 25010000002 VANCOMYCIN PER 500 MG

## 2022-12-06 RX ORDER — INSULIN LISPRO 100 [IU]/ML
0-7 INJECTION, SOLUTION INTRAVENOUS; SUBCUTANEOUS
Status: DISCONTINUED | OUTPATIENT
Start: 2022-12-06 | End: 2022-12-13 | Stop reason: HOSPADM

## 2022-12-06 RX ORDER — AMLODIPINE BESYLATE 10 MG/1
10 TABLET ORAL DAILY
COMMUNITY

## 2022-12-06 RX ORDER — DEXTROSE MONOHYDRATE 25 G/50ML
25 INJECTION, SOLUTION INTRAVENOUS
Status: DISCONTINUED | OUTPATIENT
Start: 2022-12-06 | End: 2022-12-13 | Stop reason: HOSPADM

## 2022-12-06 RX ORDER — OXYCODONE HYDROCHLORIDE 5 MG/1
5 TABLET ORAL EVERY 4 HOURS PRN
COMMUNITY

## 2022-12-06 RX ORDER — VANCOMYCIN HYDROCHLORIDE 1 G/200ML
1000 INJECTION, SOLUTION INTRAVENOUS EVERY 12 HOURS SCHEDULED
Status: DISCONTINUED | OUTPATIENT
Start: 2022-12-06 | End: 2022-12-09

## 2022-12-06 RX ORDER — QUETIAPINE FUMARATE 25 MG/1
12.5 TABLET, FILM COATED ORAL DAILY
Status: DISCONTINUED | OUTPATIENT
Start: 2022-12-06 | End: 2022-12-13 | Stop reason: HOSPADM

## 2022-12-06 RX ORDER — HALOPERIDOL 5 MG/ML
10 INJECTION INTRAMUSCULAR EVERY 12 HOURS PRN
COMMUNITY
End: 2022-12-13 | Stop reason: HOSPADM

## 2022-12-06 RX ORDER — NICOTINE POLACRILEX 4 MG
15 LOZENGE BUCCAL
Status: DISCONTINUED | OUTPATIENT
Start: 2022-12-06 | End: 2022-12-13 | Stop reason: HOSPADM

## 2022-12-06 RX ADMIN — VANCOMYCIN HYDROCHLORIDE 1000 MG: 1 INJECTION, SOLUTION INTRAVENOUS at 21:53

## 2022-12-06 RX ADMIN — AMLODIPINE BESYLATE 10 MG: 10 TABLET ORAL at 09:19

## 2022-12-06 RX ADMIN — Medication 5 MG: at 22:07

## 2022-12-06 RX ADMIN — OLANZAPINE 5 MG: 5 TABLET, FILM COATED ORAL at 22:07

## 2022-12-06 RX ADMIN — OLANZAPINE 5 MG: 5 TABLET, FILM COATED ORAL at 09:19

## 2022-12-06 RX ADMIN — QUETIAPINE FUMARATE 12.5 MG: 25 TABLET ORAL at 09:18

## 2022-12-06 RX ADMIN — LISINOPRIL 40 MG: 40 TABLET ORAL at 22:07

## 2022-12-06 RX ADMIN — APIXABAN 2.5 MG: 2.5 TABLET, FILM COATED ORAL at 09:18

## 2022-12-06 RX ADMIN — INSULIN LISPRO 5 UNITS: 100 INJECTION, SOLUTION INTRAVENOUS; SUBCUTANEOUS at 08:29

## 2022-12-06 RX ADMIN — INSULIN DETEMIR 15 UNITS: 100 INJECTION, SOLUTION SUBCUTANEOUS at 18:18

## 2022-12-06 RX ADMIN — SODIUM CHLORIDE 100 ML/HR: 9 INJECTION, SOLUTION INTRAVENOUS at 09:00

## 2022-12-06 RX ADMIN — HYDROMORPHONE HYDROCHLORIDE 0.5 MG: 1 INJECTION, SOLUTION INTRAMUSCULAR; INTRAVENOUS; SUBCUTANEOUS at 07:14

## 2022-12-06 RX ADMIN — CEFEPIME HYDROCHLORIDE 1 G: 1 INJECTION, POWDER, FOR SOLUTION INTRAMUSCULAR; INTRAVENOUS at 17:32

## 2022-12-06 RX ADMIN — LORAZEPAM 2 MG: 1 TABLET ORAL at 07:15

## 2022-12-06 RX ADMIN — Medication 10 ML: at 22:08

## 2022-12-06 RX ADMIN — INSULIN LISPRO 3 UNITS: 100 INJECTION, SOLUTION INTRAVENOUS; SUBCUTANEOUS at 12:37

## 2022-12-06 RX ADMIN — LISINOPRIL 20 MG: 20 TABLET ORAL at 09:18

## 2022-12-06 RX ADMIN — CEFEPIME 2 G: 2 INJECTION, POWDER, FOR SOLUTION INTRAVENOUS at 12:37

## 2022-12-06 RX ADMIN — INSULIN LISPRO 4 UNITS: 100 INJECTION, SOLUTION INTRAVENOUS; SUBCUTANEOUS at 17:33

## 2022-12-06 RX ADMIN — APIXABAN 2.5 MG: 2.5 TABLET, FILM COATED ORAL at 22:07

## 2022-12-06 RX ADMIN — NIFEDIPINE 30 MG: 30 TABLET, FILM COATED, EXTENDED RELEASE ORAL at 09:19

## 2022-12-06 RX ADMIN — CEFAZOLIN SODIUM 2 G: 2 INJECTION, SOLUTION INTRAVENOUS at 07:14

## 2022-12-06 RX ADMIN — DOCUSATE SODIUM 50 MG AND SENNOSIDES 8.6 MG 2 TABLET: 8.6; 5 TABLET, FILM COATED ORAL at 22:07

## 2022-12-06 RX ADMIN — VANCOMYCIN HYDROCHLORIDE 1750 MG: 10 INJECTION, POWDER, LYOPHILIZED, FOR SOLUTION INTRAVENOUS at 10:56

## 2022-12-06 NOTE — PROGRESS NOTES
"          Orthopaedic Surgery Progress Note    LOS: 1 day   Patient Care Team:  Provider, No Known as PCP - General  1 Day Post-Op   Procedure(s):  Transmetatarsal amputation of Left Foot  Subjective   Interval History:   Resting comfortably in bed, pleasantly demented, intermittently asking questions    Objective     Vital Signs:  Temp (24hrs), Av °F (36.7 °C), Min:97.2 °F (36.2 °C), Max:98.8 °F (37.1 °C)    BP (!) 194/75 (BP Location: Right arm, Patient Position: Lying) Comment: told nurse  Pulse 91   Temp 98.8 °F (37.1 °C) (Temporal)   Resp 17   Ht 182.9 cm (72\")   Wt 81.6 kg (180 lb)   SpO2 93%   BMI 24.41 kg/m²     Labs:  Lab Results (last 24 hours)     Procedure Component Value Units Date/Time    Basic Metabolic Panel [[id number]]  (Abnormal) Collected: 22    Specimen: Blood Updated: 22     Glucose 273 mg/dL      BUN 20 mg/dL      Creatinine 1.03 mg/dL      Sodium 139 mmol/L      Potassium 4.4 mmol/L      Chloride 103 mmol/L      CO2 26.0 mmol/L      Calcium 8.9 mg/dL      BUN/Creatinine Ratio 19.4     Anion Gap 10.0 mmol/L      eGFR 80.6 mL/min/1.73      Comment: National Kidney Foundation and American Society of Nephrology (ASN) Task Force recommended calculation based on the Chronic Kidney Disease Epidemiology Collaboration (CKD-EPI) equation refit without adjustment for race.       Narrative:      GFR Normal >60  Chronic Kidney Disease <60  Kidney Failure <15      POC Glucose Once [[id number]]  (Abnormal) Collected: 22    Specimen: Blood Updated: 22     Glucose 301 mg/dL      Comment: Meter: [id number] : [id number] Caren Toledo       CBC (No Diff) [[id number]]  (Abnormal) Collected: 22 0503    Specimen: Blood Updated: 22 0532     WBC 11.88 10*3/mm3      RBC 2.90 10*6/mm3      Hemoglobin 8.2 g/dL      Hematocrit 25.0 %      MCV 86.2 fL      MCH 28.3 pg      MCHC 32.8 g/dL      RDW 14.1 %      RDW-SD 44.5 fl      MPV 10.5 fL      " Platelets 287 10*3/mm3     Tissue / Bone Culture - Tissue, Toe, Left [[id number]] Collected: 12/05/22 1837    Specimen: Tissue from Toe, Left Updated: 12/05/22 2117     Gram Stain Moderate (3+) WBCs seen      No organisms seen    Fungus Culture - Tissue, Toe, Left [[id number]] Collected: 12/05/22 1837    Specimen: Tissue from Toe, Left Updated: 12/05/22 1911    AFB Culture - Tissue, Toe, Left [[id number]] Collected: 12/05/22 1837    Specimen: Tissue from Toe, Left Updated: 12/05/22 1911    Anaerobic Culture - Tissue, Toe, Left [[id number]] Collected: 12/05/22 1837    Specimen: Tissue from Toe, Left Updated: 12/05/22 1911    POC Glucose Once [[id number]]  (Normal) Collected: 12/05/22 1518    Specimen: Blood Updated: 12/05/22 1520     Glucose 115 mg/dL      Comment: Meter: [id number] : [id number] Yasmany LU       Basic Metabolic Panel [[id number]]  (Abnormal) Collected: 12/05/22 1138    Specimen: Blood Updated: 12/05/22 1211     Glucose 167 mg/dL      BUN 22 mg/dL      Creatinine 1.03 mg/dL      Sodium 141 mmol/L      Potassium 4.3 mmol/L      Chloride 105 mmol/L      CO2 28.0 mmol/L      Calcium 9.8 mg/dL      BUN/Creatinine Ratio 21.4     Anion Gap 8.0 mmol/L      eGFR 80.6 mL/min/1.73      Comment: National Kidney Foundation and American Society of Nephrology (ASN) Task Force recommended calculation based on the Chronic Kidney Disease Epidemiology Collaboration (CKD-EPI) equation refit without adjustment for race.       Narrative:      GFR Normal >60  Chronic Kidney Disease <60  Kidney Failure <15      CBC (No Diff) [[id number]]  (Abnormal) Collected: 12/05/22 1138    Specimen: Blood Updated: 12/05/22 1146     WBC 8.63 10*3/mm3      RBC 3.51 10*6/mm3      Hemoglobin 9.8 g/dL      Hematocrit 30.4 %      MCV 86.6 fL      MCH 27.9 pg      MCHC 32.2 g/dL      RDW 14.4 %      RDW-SD 45.8 fl      MPV 10.2 fL      Platelets 333 10*3/mm3           Physical Exam:  left LE: splint CDI, compartments soft/compressible   Leg  warm and well-perfused    Assessment/Plan:  1 Day Post-Op status post:  Procedure(s):  Transmetatarsal amputation of Left Foot  -NWB operative extremity  -Advance diet as tolerated  -Keep splint/operative dressing clean and dry  -DVT prophylaxis, Eliquis  -Continue antibiotics per ID recommendations, ID consult placed  -Pain control  -Follow-up Intra-Op cultures  -Elevate operative extremity  -Okay for DC from ortho standpoint, f/u information and DC instructions placed in chart, f/u in clinic in 2 weeks for wound check  -Rest of management per primary team    Yeison Petty MD  12/06/22  07:56 EST

## 2022-12-06 NOTE — PROGRESS NOTES
"Pharmacy Consult-Vancomycin Dosing  Omkar Nava is a  67 y.o. male receiving vancomycin therapy.     Indication: osteomyelitis  Consulting Provider: Dr Gordon  ID Consult: yes  Goal AUC: 400 - 600 mg/L*hr    Current Antimicrobial Therapy  Anti-Infectives (From admission, onward)      Ordered     Dose/Rate Route Frequency Start Stop    12/06/22 1020  cefepime (MAXIPIME) 1 g/100 mL 0.9% NS IVPB (mbp)        Ordering Provider: Ijeoma Kuhn, Thom    1 g  over 4 Hours Intravenous Every 8 Hours 12/06/22 2000 01/17/23 1959 12/06/22 1020  cefepime (MAXIPIME) 2 g/100 mL 0.9% NS (mbp)        Ordering Provider: Ijeoma Kuhn PharmD    2 g  200 mL/hr over 30 Minutes Intravenous Once 12/06/22 1115      12/06/22 1007  vancomycin 1750 mg/500 mL 0.9% NS IVPB ([facility])        Ordering Provider: Ijeoma Kuhn PharmD    20 mg/kg × 81.6 kg  over 120 Minutes Intravenous Once 12/06/22 1100      12/06/22 1022  Pharmacy to dose vancomycin        Ordering Provider: Rayray Gordon MD     Does not apply Continuous PRN 12/06/22 1021 01/17/23 1020    12/05/22 2055  ceFAZolin in dextrose (ANCEF) IVPB solution 2 g        Ordering Provider: Yeison Petty MD    2 g  over 30 Minutes Intravenous Every 8 Hours 12/06/22 0000 12/06/22 0744    12/05/22 1058  vancomycin 1250 mg/250 mL 0.9% NS IVPB ([facility])        Ordering Provider: Yeison Petty MD    15 mg/kg × 89.9 kg Intravenous Once 12/05/22 1100 12/05/22 1651          Allergies  Allergies as of 11/30/2022   • (No Known Allergies)     Labs  Results from last 7 days   Lab Units 12/06/22  0701 12/05/22  1138   BUN mg/dL 20 22   CREATININE mg/dL 1.03 1.03     Results from last 7 days   Lab Units 12/06/22  0503 12/05/22  1138 11/30/22  1458   WBC 10*3/mm3 11.88* 8.63 7.44     Evaluation of Dosing     Last Dose Received in the ED/Outside Facility: vancomycin 1250 mg IV x1 on 12/5 preop  Is Patient on Dialysis or Renal Replacement: no    Ht - 182.9 cm (72\")  Wt - 81.6 kg (180 " lb)    Estimated Creatinine Clearance: 82.5 mL/min (by C-G formula based on SCr of 1.03 mg/dL).    Intake & Output (last 3 days)         12/03 0701 12/04 0700 12/04 0701 12/05 0700 12/05 0701 12/06 0700 12/06 0701 12/07 0700    P.O.    480    I.V. (mL/kg)   2000 (24.5)     Total Intake(mL/kg)   2000 (24.5) 480 (5.9)    Net   +2000 +480            Urine Unmeasured Occurrence   3 x           Microbiology and Radiology  Microbiology Results (last 10 days)       Procedure Component Value - Date/Time    Tissue / Bone Culture - Tissue, Toe, Left [[id number]] Collected: 12/05/22 1837    Lab Status: Preliminary result Specimen: Tissue from Toe, Left Updated: 12/06/22 0911     Tissue Culture Growth present, too young to evaluate     Gram Stain Moderate (3+) WBCs seen      No organisms seen          Reported Vancomycin Levels           Insight[employer] AUC Calculation:    Current AUC:  mg/L*hr    Predicted Steady State AUC on Current Dose:mg/L*hr  _________________________________    Predicted Steady State AUC on New Dose:   577 mg/L*hr    Assessment/Plan:    1. Pharmacy to dose vancomycin for osteomyelitis. Goal  to 600 mg/L*hr  2. Recommend a loading dose of vancomycin 1750 mg.  3. Will start maintenance dose of vancomycin 1000 mg IV q12h (~ 12.3 mg/kg).   Contacted Re[facility] facility, [facility], reported that he had remained on vancomycin 1 gm iv q12 through 11/25.  His vancomycin prior to dose on 11/22 was 12 per [place].  4. Vancomycin trough scheduled for 12/7 at 0600 prior to the third dose.       Per Dr Gordon, planning 6 weeks of IV antibiotics  5. Monitor renal function, cultures and sensitivities, and clinical status, and adjust regimen as necessary.  Pharmacy will continue to follow.    Thank you for consult    Ijeoma Kuhn, PharmD  12/6/2022  10:45 EST

## 2022-12-06 NOTE — PLAN OF CARE
Problem: Fall Injury Risk  Goal: Absence of Fall and Fall-Related Injury  Intervention: Identify and Manage Contributors  Recent Flowsheet Documentation  Taken 12/6/2022 0600 by Erlinda Matute RN  Medication Review/Management: medications reviewed  Taken 12/6/2022 0420 by Erlinda Matute RN  Medication Review/Management: medications reviewed  Taken 12/5/2022 2230 by Erlinda Matute RN  Medication Review/Management:   medications reviewed   provider consulted  Intervention: Promote Injury-Free Environment  Recent Flowsheet Documentation  Taken 12/6/2022 0600 by Erlinda Matute RN  Safety Promotion/Fall Prevention:   safety round/check completed   fall prevention program maintained   clutter free environment maintained   assistive device/personal items within reach   activity supervised  Taken 12/6/2022 0420 by Erlinda Matute RN  Safety Promotion/Fall Prevention:   safety round/check completed   fall prevention program maintained   clutter free environment maintained   assistive device/personal items within reach   activity supervised  Taken 12/6/2022 0200 by Erlinda Matute RN  Safety Promotion/Fall Prevention:   safety round/check completed   fall prevention program maintained   clutter free environment maintained   assistive device/personal items within reach   activity supervised  Taken 12/6/2022 0000 by Erlinda Matute RN  Safety Promotion/Fall Prevention:   safety round/check completed   fall prevention program maintained   clutter free environment maintained   activity supervised   assistive device/personal items within reach  Taken 12/5/2022 2230 by Erlinda Matute RN  Safety Promotion/Fall Prevention:   safety round/check completed   nonskid shoes/slippers when out of bed   fall prevention program maintained   clutter free environment maintained   assistive device/personal items within reach   activity supervised   Goal Outcome Evaluation:      Pt is alert with confusion (alert to self only). A one time dose of IV Valium  for agitated r/t pt having spitting all his HS meds out. Pt did not sleep all night. He was restless turning upside down in the bed, throwing his legs over the rail, taking all his cloths off, frequent urination with multiple bed changes. Multiple attempts to get help with orders from Hospilist to no avail. Staff at bedside all shift because pt is an extremely high fall risk.

## 2022-12-06 NOTE — PLAN OF CARE
Goal Outcome Evaluation:Aggressive and violent behavior, exhibited this early morning, is not seen since @0900.  He is compliant with medications & treatments.  He has remained in the bed and calm.  Family have christiano at the bedside all day.  He is keeping his nasal cannula in position.  Continue to monitor.

## 2022-12-06 NOTE — BRIEF OP NOTE
Orthopedics Transmetatarsal of Left Foot  Brief Op Note  Omkar Nava  12/5/2022  Pre-op Diagnosis:   Acute osteomyelitis of left foot (HCC) [M86.172]  Post-op Diagnosis:  * No post-op diagnosis entered *  Post-Op Diagnosis Codes:     * Acute osteomyelitis of left foot (HCC) [M86.172]  Procedure:  Procedure(s):  Transmetatarsal of Left Foot  Surgeon(s):  Yeison Petty MD  Anesthesia:  General  Staff:   Circulator: Johana Maki RN; Chuyita Casas RN  Scrub Person: Karlee Cueva; Debra Saleh RN  Nursing Assistant: Lopez Mathews PCT  Estimated Blood Loss:  100ml  Specimens:  None   Drains:  None  Complications:  None    Yeison Petty MD   Date: 12/5/2022  Time: 19:08 EST

## 2022-12-06 NOTE — CONSULTS
INFECTIOUS DISEASE CONSULTATION    Omkar Nava  1955  [id number]    Consult: 12/6/22, 10/15/22  Admit date: 12/5/2022    Requesting Provider: Omkar Seth MD  Evaluating physician:  Rayray Gordon MD  Reason for Consultation: left foot osteomyelitis, first toe, distal phalanx  Chief Complaint: left foot pain      Subjective   History of present illness:  Patient is a  67 y.o. male with h/o T2DM, PN, HTN, normocytic anemia, and GERD who presented to [facility] ED on 10/14 for worsening left foot pain.  The patient is a poor historian and most of the information was taken from the chart.  He tripped at his nursing facility, [facility], while running the street in flip-flops and sustained a laceration of his first webspace.  He was taken to [facility] ED on 9/17/22 and found to have a small intra-articular vertical fracture through the base the the great toe proximal phalanx. Podiatry, Dr. Ruiz, saw patient and the wound with irrigated with suture placement.  The area was dressed and he was placed in a surgical shoe.  He was given Cefazolin x 1 dose and discharged back to his facility on Keflex for 7 days.  On 9/26/22, the nursing staff at increased pain, swelling, and redness around the foot and sent him to [facility] ED on that day.  He was found to have a displaced fracture of proximal phalanx of left hallux along with abscess and gas in tissues.  He was admitted to the hospital and underwent Left foot debridement and I and D on 9/27/22 by Dr. Ruiz with culture positive for MRSA (Resistant to Cefazolin, clindamycin, erm, oxacillin, tetracycline, Bactrim and sensitive to Vancomycin, Daptomycin-KB 1, and Linezolid KB 2), Corynebacterium striatum, and Enterococcus faecalis (sens to Vanc and ampicillin).  He underwent a second debridement and I and D on 9/30.  He was given Cefazolin in ED and then changed to Daptomycin.  A PICC line was placed on 10/3 for Daptomycin  infusions, but the antibiotic was too expensive for the SNF, so he was changed to oral Zyvox and Rifampin until 10/26.  The PICC line was removed on 10/4.  His blood cultures remained negative.  He was discharged back to his nursing facility on 10/4/22.      He was sent to [facility] ED on 10/14 after nursing staff noted that his left hallux appeared necrotic.  He has had no fever, chills, shortness of breath, nausea, vomiting, diarrhea, dysuria, or worsening foot pain.  On arrival, he is afebrile and hemodynamically stable.  On 10/15, his BP went up to 203/81.  Admitting labs were WBC 7200 with 69% neutrophils, ESR 67, CRP 3.07, Hgb 9.2, PCT 0.05, lactic acid 2.3, and creatinine 1.21.  A MRSA PCR was negative.  Blood and wound cultures are pending.  An MRI of the left foot on 10/15 showed wound around the left great toe with marrow signal alteration within the distal aspect of 1st MT as well as both phalanges c/w osteomyelitis and scattered foci in soft tissues that may be foci of gas without evidence of fluid collections or abscesses. An Xray of left foot showed soft tissue swelling of 1st digit with displaced fracture of medial base of 1st proximal phalanx.  He is currently on Cefepime and Vancomycin.  ID was asked by Dr. Seth on 10/15 to evaluate and manage his antibiotic therapy.  Patient has had some issues with psychosis over the past 6 months possibly related to drug and alcohol use.  This is also interfered with his acceptance of care.    The patient underwent a left first ray resection on 10/18/2022.  The patient was subsequently treated with vancomycin and ceftriaxone until 11/25/2022 for cultures which grew from his left foot with MRSA, Klebsiella, and Raoultella ornithinolytica.  His foot healed and then he developed left foot wound dehiscence with exposed bone.  He was readmitted to [facility] on 12/5/2022 and underwent a transmetatarsal amputation.  I was consulted on 12/6/2022 for further  evaluation and treatment.  Patient is a poor historian.  He has no other localizing signs or symptoms of infection.    Past Medical History:   Diagnosis Date   • Anemia    • Dementia (HCC)    • Diabetes mellitus (HCC)    • Dysphagia    • GERD (gastroesophageal reflux disease)    • History of alcohol abuse    • History of cocaine use    • History of marijuana use    • Hypertension    • Osteomyelitis (HCC)    • Poor historian     records obtained from nursing home records & his family   • Visual impairment    HTN  Psychotic d/o, unspecified.    Past Surgical History:   Procedure Laterality Date   • AMPUTATION FOOT Left 10/18/2022    Procedure: PARTIAL FIRST RAY AMPUTATION LEFT;  Surgeon: Yeison Petty MD;  Location: [facility];  Service: Orthopedics;  Laterality: Left;   • EYE SURGERY     Left foot debridement/I and D x 2 9/27/22 and 9/30/22.  Left first ray amputation 10/18/2022.    Pediatric History   Patient Parents   • Not on file     Other Topics Concern   • Not on file   Social History Narrative    Caffeine 0-1 servings per day    Patient lives at home .   Patient [facility] at [facility]  Former smoker, no alcohol, smokes marijuana.     family history includes Diabetes in his brother, brother, father, maternal grandfather, maternal grandmother, mother, and sister; Hypertension in his brother, brother, father, and mother.    No Known Allergies      There is no immunization history on file for this patient.    Medication:    Current Facility-Administered Medications:   •  acetaminophen (TYLENOL) tablet 650 mg, 650 mg, Oral, Q4H PRN **OR** acetaminophen (TYLENOL) suppository 650 mg, 650 mg, Rectal, Q4H PRN, Yeison Petty MD  •  amLODIPine (NORVASC) tablet 10 mg, 10 mg, Oral, Q24H, 10 mg at 12/06/22 0919 **AND** lisinopril (PRINIVIL,ZESTRIL) tablet 20 mg, 20 mg, Oral, Q24H, Yeison Petty MD, 20 mg at 12/06/22 0918  •  apixaban (ELIQUIS) tablet 2.5 mg, 2.5 mg, Oral, BID, Yeison Petty MD, 2.5 mg at  12/06/22 0918  •  bisacodyl (DULCOLAX) suppository 10 mg, 10 mg, Rectal, Daily PRN, Yeison Petty MD  •  dextrose (D50W) (25 g/50 mL) IV injection 25 g, 25 g, Intravenous, Q15 Min PRN, Tea Bentley PA-C  •  dextrose (GLUTOSE) oral gel 15 g, 15 g, Oral, Q15 Min PRN, Tea Bentley PA-C  •  glucagon (human recombinant) (GLUCAGEN DIAGNOSTIC) injection 1 mg, 1 mg, Intramuscular, Q15 Min PRN, Tea Bentley PA-C  •  haloperidol (HALDOL) 2 MG/ML solution 2 mg, 2 mg, Oral, Daily PRN, Yeison Petty MD  •  HYDROmorphone (DILAUDID) injection 0.5 mg, 0.5 mg, Intravenous, Q2H PRN, 0.5 mg at 12/06/22 0714 **AND** naloxone (NARCAN) injection 0.1 mg, 0.1 mg, Intravenous, Q5 Min PRN, Yeison Petty MD  •  Insulin Lispro (humaLOG) injection 0-7 Units, 0-7 Units, Subcutaneous, TID AC, Tea Bentley PA-C, 5 Units at 12/06/22 0829  •  lactated ringers infusion, 9 mL/hr, Intravenous, Continuous, Yeison Petty MD, Last Rate: 125 mL/hr at 12/05/22 1657, New Bag at 12/05/22 1657  •  lactated ringers infusion, 100 mL/hr, Intravenous, Continuous, Yeison Petty MD, Last Rate: 100 mL/hr at 12/05/22 2053, 100 mL/hr at 12/05/22 2053  •  lisinopril (PRINIVIL,ZESTRIL) tablet 40 mg, 40 mg, Oral, Daily, Yeison Petty MD  •  LORazepam (ATIVAN) tablet 2 mg, 2 mg, Oral, Q6H PRN, Yeison Petty MD, 2 mg at 12/06/22 0715  •  melatonin tablet 5 mg, 5 mg, Oral, Nightly, Yeison Petty MD  •  melatonin tablet 5 mg, 5 mg, Oral, Nightly PRN, Yeison Petty MD  •  NIFEdipine XL (PROCARDIA XL) 24 hr tablet 30 mg, 30 mg, Oral, Q24H, Yeison Petty MD, 30 mg at 12/06/22 0919  •  OLANZapine (zyPREXA) tablet 5 mg, 5 mg, Oral, BID, Yeison Petty MD, 5 mg at 12/06/22 0919  •  ondansetron (ZOFRAN) tablet 4 mg, 4 mg, Oral, Q6H PRN **OR** ondansetron (ZOFRAN) injection 4 mg, 4 mg, Intravenous, Q6H PRN, Yeison Petty MD  •  oxyCODONE (ROXICODONE) immediate release tablet 5 mg, 5 mg, Oral, Q4H PRN, Yeison Petty MD  •   "polyethylene glycol (MIRALAX) packet 17 g, 17 g, Oral, PRN, Yeison Petty MD  •  QUEtiapine (SEROquel) tablet 12.5 mg, 12.5 mg, Oral, Daily, Tea Bentley PA-C, 12.5 mg at 12/06/22 0918  •  sennosides-docusate (PERICOLACE) 8.6-50 MG per tablet 2 tablet, 2 tablet, Oral, Nightly, Yeison Petty MD  •  sodium chloride 0.9 % flush 10 mL, 10 mL, Intravenous, Q12H, Yeison Petty MD, 10 mL at 12/05/22 2226  •  sodium chloride 0.9 % flush 10 mL, 10 mL, Intravenous, PRN, Yeison Petty MD  •  sodium chloride 0.9 % infusion 40 mL, 40 mL, Intravenous, PRN, Yeison Petty MD  •  sodium chloride 0.9 % infusion, 100 mL/hr, Intravenous, Continuous, Yeison Petty MD, Last Rate: 100 mL/hr at 12/05/22 2221, 100 mL/hr at 12/05/22 2221    Please refer to the medical record for a full medication list    Review of Systems:  Unable to get a reliable history as patient is responding with yes and no to basic questions and has a psychotic disorder.  Denies fever, chills, nausea, vomiting, diarrhea, shortness of breath, dysuria, or rashes.    Physical Exam:   Vital Signs   Temp:  [97.2 °F (36.2 °C)-98.8 °F (37.1 °C)] 98.8 °F (37.1 °C)  Heart Rate:  [] 91  Resp:  [14-18] 17  BP: (152-194)/(67-94) 194/75    Temp  Min: 97.2 °F (36.2 °C)  Max: 98.8 °F (37.1 °C)  BP  Min: 152/92  Max: 194/75  Pulse  Min: 73  Max: 107  Resp  Min: 14  Max: 18  SpO2  Min: 90 %  Max: 99 %    Blood pressure (!) 194/75, pulse 91, temperature 98.8 °F (37.1 °C), temperature source Temporal, resp. rate 17, height 182.9 cm (72\"), weight 81.6 kg (180 lb), SpO2 93 %.  GENERAL: Awake and alert, in moderate distress. Appears older than stated age.  Resting in bed.    HEENT:  Normocephalic, atraumatic.  Oropharynx without thrush.   EYES: PERRLA.  No conjunctival injection. No icterus. EOM full.  LYMPHATICS: No lymphadenopathy of the neck or axillary or inguinal regions.   HEART: No murmur, gallop, or pericardial friction rub. Reg rate rhythm.  No JVD.  LUNGS: " Clear to auscultation and percussion. No respiratory distress, no use of accessory muscles.  No wheeze.  ABDOMEN: Soft, nontender, nondistended. No appreciable HSM.  Bowel sounds normal.  No masses.  : No CVA tenderness, external genital lesions, rectal per HPI.  SKIN: Warm and dry without cutaneous eruptions.   Left foot dressing in place, right arm PICC okay placed 10/27.  Splint in place.  No drainage.  PSYCHIATRIC: Mental status with occasional confusion.  But overall pleasant.  Follows commands.    EXT: Left foot surgical dressing in place.  No crepitus, or foul smell.  NEURO: Oriented to name, nonfocal.  Follows some commands and pleasant with me.  Cranial nerves II through XII intact, deep tendon reflexes decreased lower extremities, sensory decreased to light touch lower extremities, motor 4/5 upper and lower extremities.        Results Review:   I reviewed the patient's new clinical results.  I reviewed the patient's new imaging results and agree with the interpretation.  I reviewed the patient's other test results and agree with the interpretation    Results from last 7 days   Lab Units 12/06/22  0503 12/05/22  1138 11/30/22  1458   WBC 10*3/mm3 11.88* 8.63 7.44   HEMOGLOBIN g/dL 8.2* 9.8* 9.0*   HEMATOCRIT % 25.0* 30.4* 27.3*   PLATELETS 10*3/mm3 287 333 295     Results from last 7 days   Lab Units 12/06/22  0701   SODIUM mmol/L 139   POTASSIUM mmol/L 4.4   CHLORIDE mmol/L 103   CO2 mmol/L 26.0   BUN mg/dL 20   CREATININE mg/dL 1.03   GLUCOSE mg/dL 273*   CALCIUM mg/dL 8.9             Results from last 7 days   Lab Units 11/30/22  1458   CRP mg/dL 0.88*             Estimated Creatinine Clearance: 82.5 mL/min (by C-G formula based on SCr of 1.03 mg/dL).  CPK    Common Labsle 10/19/22   Creatine Kinase 119            Procalitonin Results:       Brief Urine Lab Results  (Last result in the past 365 days)      Color   Clarity   Blood   Leuk Est   Nitrite   Protein   CREAT   Urine HCG        06/12/22 0446  Yellow   Clear     Negative                    No results found for: SITE, ALLENTEST, PHART, GEN9GXE, PO2ART, LFM8MPZ, BASEEXCESS, Y7XPEIKJ, HGBBG, HCTABG, OXYHEMOGLOBI, METHHGBN, CARBOXYHGB, CO2CT, BAROMETRIC, MODALITY, FIO2     Microbiology:  Microbiology Results (last 10 days)     Procedure Component Value - Date/Time    Tissue / Bone Culture - Tissue, Toe, Left [[id number]] Collected: 12/05/22 1837    Lab Status: Preliminary result Specimen: Tissue from Toe, Left Updated: 12/06/22 0911     Tissue Culture Growth present, too young to evaluate     Gram Stain Moderate (3+) WBCs seen      No organisms seen        Blood and wound cultures 10/14 pending.       No results found for: TISSCXQ, CULTURES, CULTURE, BLOODCX, ANACX, BALCX, ACIDFASTCX, BODYFLDCX, CXREFLEX, FUNGUSCX, RESPCX, MRSACX, ROUTCX, BRCHWSHCLT, TISSUECX, URINECX, CMVCX, WOUNDCX, BCIDPCR, BFCULTURE, BLOODCULT(      Radiology:  Imaging Results (Last 72 Hours)     ** No results found for the last 72 hours. **          IMPRESSION:   1. Left hallux osteomyelitis after trauma/displaced fracture 9/17/22.  Developed gangrene and poor wound healing related to vascular disease and noncompliance.  Improved after surgery 10/18 with ray resection but then had dehiscence of wound with exposed bone.  2. 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  Cx with MRSA, Enterococcus faecalis, and Corynebacterium striatum.  Initially treated with Daptomycin and discharge on oral Zyvox and oral Rifampin until 10/26/22.  Cx with Raoultella ornithinolytica (sens to Cefepime, ceftriaxone) and Klebsiella.  Status post left first ray resection 10/18/2022.   3. Left foot wound dehiscence with exposed metatarsal new on 12/4, status post transmetatarsal amputation 12/5/2022.   4. Elevated inflammatory markers, related to above.  CRP 3.07 on 10/14.  CRP 3.10 on 10/26.  CRP 0.88 on 11/30.  5. Type 2 diabetes mellitus/peripheral  neuropathy.  Affects wound healing.  6. Hyperglycemia, related to above.  7. Anemia of chronic disease.    8. Essential hypertension.    9. Gastroesophageal reflux disease.  10. Psychosis since April 2022 reported by the patient's sister, thought to be related to drug and alcohol use.  Was hospitalized previously at [facility].  Awaiting placement.  Has interfered with his care.    Recommendations:  1. Diagnostically, follow blood and wound cultures, imaging, physical examination, CBC, CMP, CRP, and cultures which were obtained at surgery on 12/5/2022.   2. Therapeutically, consider vancomycin and cefepime awaiting culture data..  3. Orthopedic surgery status post left first ray resection for osteomyelitis 10/18/2022, 12/5/2022.   4. Previously was a difficult placement.    I discussed the patient's findings and my recommendations with patient and previously with his family.  He will need to be in a skilled facility to receive his antibiotics.    Thank you for consultation.  Our group would be pleased to follow this patient over the course of their hospitalization and assist with outpatient antimicrobial therapy, as indicated.  Side effects of medications discussed with nursing and previously with family.  Further recommendations depend on the results of the cultures and clinical course. Discussed with patient's sister and family.  Difficult issue with compliance in terms of wound care and staying off his foot.      Rayray Gordon MD  12/6/2022I

## 2022-12-06 NOTE — OP NOTE
ORTHOPAEDIC SURGERY OPERATIVE REPORT    Patient Name:  Omkar Nava  YOB: 1955    Date of Surgery:  12/5/2022     Pre-op Diagnosis:   Acute osteomyelitis of left foot (HCC) [M86.172]    Post-op Diagnosis:      Post-Op Diagnosis Codes:     * Acute osteomyelitis of left foot (HCC) [M86.172]    Procedure/CPT® Codes:  1. Transmetatarsal amputation of Left Foot, 24060  2. Application short leg splint, 53196    Staff:  Surgeon(s):  Yeison Petty MD       Anesthesia: General    Estimated Blood Loss: 100ml    Specimen:          None    Complications:   None    CLINICAL HISTORY:  Omkar Nava is a 67 y.o. male with the above condition. Discussed operative and non-operative treatment options and risks including but not limited to pain, infection, bleeding, damage to surrounding structures, and need for additional procedures.  After all questions were fully answered, the guardian of Omkar Nava understood the risks and elected to proceed, and informed consent was obtained. The patient was marked with indelible marking pen after verifying correct side, site, and procedure.      DESCRIPTION OF PROCEDURE:   The patient was identified in the pre-operative area where correct procedure, site, and patient were verified. The patient was brought to the operating theater in stable condition and was transferred to the operative table where they remained in the supine position for the entirety of the case. Preoperative timeout was taken to ensure the correct identity, operative procedure, and location. General anesthesia was then administered. The patient received appropriate weight based IV antibiotics within 30 minutes of skin incision.  All bony prominences well-padded. The left leg was then prepped and draped in the standard sterile fashion.      The toes and dorsal forefoot tissue were sharply excised off the foot.  We left the plantar and medial foot tissue of the forefoot for anticipated flap.  The soft  tissues were dissected off the 2nd through 5th metatarsals.    We used the osciallating saw to transect the metatarsals fairly proximally and bevel to minimize pressure concentrations.     The flap was debulked of unnecessary tendons and tissue that would interfere with flap application.     Irrisept was used to copiously irrigate the wound. Cultures were sent from the wound margin and underlying bone.      The skin envelope was trimmed to allow for good apposition with minimal tension and dead space. The plantar tissue flap was brought up such that the final closure wound would be away from the plantar surface. The edges of the tissue were bleeding and appeared viable.      The skin was closed in layers with 0 PDS used to close the deep layer over the bones.  2-0 PDS was used to close the subcutaneous layer, and 2-0 nylon was used to close the skin with horizontal l mattress sutures.     A short leg fiberglass splint was applied to protect the soft tissues.    The patient tolerated the procedure well no with acute complications identified.  All sponge & needle counts were correct x2 prior to procedure conclusion.  Patient was awoken from anesthesia and transferred back to the PACU without complication.     Counts:    Sponge: Correct    Needle: Correct    Sharp: Correct    Instrument: Correct     POSTOPERATIVE PLAN:   -NWB operative extremity  -Advance diet as tolerated  -Keep splint/operative dressing clean and dry  -DVT prophylaxis, Eliquis  -Continue antibiotics per ID recommendations, ID consult placed  -Pain control  -Follow-up Intra-Op cultures  -Elevate operative extremity  -Rest of management per primary team    Implants:    Nothing was implanted during the procedure    Yeison Petty MD     Date: 12/5/2022  Time: 20:25 EST  Electronically signed by Yeison Petty MD, 12/05/22, 8:25 PM EST.

## 2022-12-06 NOTE — CONSULTS
[facility] Medicine Services  CONSULT NOTE      Patient Name: Omkar Nava  : 1955  MRN: [id number]    Primary Care Physician: Provider, No Known  Provider requesting consultation: Yeison Petty MD    Subjective   Subjective     Reason for Consultation:  Medical Management    HPI:  Omkar Nava is a 67 y.o. male with a history of dementia, T2DM, HTN, and polysubstance abuse who was admitted here on  for planned transmetatarsal amputation of the left foot due to osteomyelitis. Procedure was performed by Dr. Petty.  Surgery went well and without acute complications, and hospital medicine services have been consulted for medical management.       Review of Systems     Patient with dementia so reliable ROS cannot be obtained.     All other systems reviewed and are negative.     Personal History     Past Medical History:   Diagnosis Date   • Anemia    • Dementia (HCC)    • Diabetes mellitus (HCC)    • Dysphagia    • GERD (gastroesophageal reflux disease)    • History of alcohol abuse    • History of cocaine use    • History of marijuana use    • Hypertension    • Osteomyelitis (HCC)    • Poor historian     records obtained from nursing home records & his family   • Visual impairment        Past Surgical History:   Procedure Laterality Date   • AMPUTATION FOOT Left 10/18/2022    Procedure: PARTIAL FIRST RAY AMPUTATION LEFT;  Surgeon: Yeison Petty MD;  Location: [facility];  Service: Orthopedics;  Laterality: Left;   • EYE SURGERY         Family History:  family history includes Diabetes in his brother, brother, father, maternal grandfather, maternal grandmother, mother, and sister; Hypertension in his brother, brother, father, and mother. Otherwise pertinent FHx was reviewed and unremarkable.     Social History:  reports that he quit smoking about 5 years ago. His smoking use included cigarettes. He has a 40.00 pack-year smoking history. He has never used smokeless tobacco. He  "reports that he does not currently use alcohol. He reports current drug use. Drugs: Marijuana and \"Crack\" cocaine.    Medications:  Cholecalciferol, Insulin Lispro, LORazepam, NIFEdipine CC, OLANZapine, acetaminophen, amLODIPine-benazepril, apixaban, ascorbic acid, ferrous sulfate, glipizide, haloperidol, insulin glargine, lisinopril, melatonin, metFORMIN, oxyCODONE-acetaminophen, and thiamine    Scheduled Meds:amLODIPine, 10 mg, Oral, Q24H   And  lisinopril, 20 mg, Oral, Q24H  apixaban, 2.5 mg, Oral, BID  ceFAZolin, 2 g, Intravenous, Q8H  insulin lispro, 0-7 Units, Subcutaneous, TID AC  lisinopril, 40 mg, Oral, Daily  melatonin, 5 mg, Oral, Nightly  NIFEdipine XL, 30 mg, Oral, Q24H  OLANZapine, 5 mg, Oral, BID  senna-docusate sodium, 2 tablet, Oral, Nightly  sodium chloride, 10 mL, Intravenous, Q12H      Continuous Infusions:lactated ringers, 9 mL/hr, Last Rate: 125 mL/hr (12/05/22 1657)  lactated ringers, 100 mL/hr, Last Rate: 100 mL/hr (12/05/22 2053)  sodium chloride, 100 mL/hr, Last Rate: 100 mL/hr (12/05/22 2221)      PRN Meds:.•  acetaminophen **OR** acetaminophen  •  bisacodyl  •  dextrose  •  dextrose  •  dextrose  •  dextrose  •  glucagon (human recombinant)  •  glucagon (human recombinant)  •  haloperidol  •  HYDROmorphone **AND** naloxone  •  LORazepam  •  melatonin  •  ondansetron **OR** ondansetron  •  oxyCODONE  •  polyethylene glycol  •  sodium chloride  •  sodium chloride    No Known Allergies    Objective   Objective     Vital Signs:   Temp:  [97.2 °F (36.2 °C)-98.4 °F (36.9 °C)] 98.4 °F (36.9 °C)  Heart Rate:  [73-92] 92  Resp:  [14-18] 18  BP: (152-176)/(67-92) 165/67  Flow (L/min):  [2-10] 3    Physical Exam  Constitutional:       General: He is not in acute distress.     Appearance: Normal appearance.   HENT:      Head: Atraumatic.      Right Ear: External ear normal.      Left Ear: External ear normal.      Nose: Nose normal.   Eyes:      Extraocular Movements: Extraocular movements intact.    "   Conjunctiva/sclera: Conjunctivae normal.      Pupils: Pupils are equal, round, and reactive to light.   Cardiovascular:      Rate and Rhythm: Normal rate and regular rhythm.      Pulses: Normal pulses.      Heart sounds: Normal heart sounds. No murmur heard.  Pulmonary:      Effort: Pulmonary effort is normal. No respiratory distress.      Breath sounds: Normal breath sounds. No wheezing, rhonchi or rales.   Abdominal:      General: Bowel sounds are normal. There is no distension.      Tenderness: There is no abdominal tenderness. There is no guarding or rebound.   Musculoskeletal:      Cervical back: No rigidity.      Right lower leg: No edema.      Left lower leg: No edema.      Comments: S/p left transmetatarsal amputation. Wrapped in bandage.    Skin:     General: Skin is warm and dry.      Coloration: Skin is not jaundiced.      Findings: No lesion or rash.   Neurological:      General: No focal deficit present.      Mental Status: He is alert.      Comments: Alert. Oriented to self only. Will follow basic commands.    Psychiatric:         Attention and Perception: Attention normal.         Mood and Affect: Mood normal.         Behavior: Behavior normal.         Thought Content: Thought content normal.           Result Review:  I have personally reviewed the results from the time of admission and agree with these findings:  [x]  Laboratory  []  Radiology  []  EKG/Telemetry   []  Pathology  []  Old records  []  Other:    LAB RESULTS:      Lab 12/06/22  0503 12/05/22  1138 11/30/22  1458   WBC 11.88* 8.63 7.44   HEMOGLOBIN 8.2* 9.8* 9.0*   HEMATOCRIT 25.0* 30.4* 27.3*   PLATELETS 287 333 295   NEUTROS ABS  --   --  4.80   IMMATURE GRANS (ABS)  --   --  0.02   LYMPHS ABS  --   --  1.77   MONOS ABS  --   --  0.64   EOS ABS  --   --  0.17   MCV 86.2 86.6 84.5   CRP  --   --  0.88*         Lab 12/05/22  1138   SODIUM 141   POTASSIUM 4.3   CHLORIDE 105   CO2 28.0   ANION GAP 8.0   BUN 22   CREATININE 1.03   EGFR  80.6   GLUCOSE 167*   CALCIUM 9.8                         Brief Urine Lab Results  (Last result in the past 365 days)      Color   Clarity   Blood   Leuk Est   Nitrite   Protein   CREAT   Urine HCG        06/12/22 2056 Yellow   Clear     Negative                   Microbiology Results (last 10 days)     Procedure Component Value - Date/Time    Tissue / Bone Culture - Tissue, Toe, Left [[id number]] Collected: 12/05/22 1837    Lab Status: Preliminary result Specimen: Tissue from Toe, Left Updated: 12/05/22 2117     Gram Stain Moderate (3+) WBCs seen      No organisms seen          No radiology results from the last 24 hrs        Assessment & Plan   Assessment & Plan     Active Hospital Problems    Diagnosis  POA   • **Acute osteomyelitis of left foot (HCC) [M86.172]  Yes     Priority: High   • Type 2 diabetes mellitus (HCC) [E11.9]  Yes     Priority: Medium   • Essential hypertension [I10]  Yes     Priority: Medium   • Neurocognitive disorder [R41.9]  Yes     Priority: Low      Resolved Hospital Problems   No resolved problems to display.       Omkar Nava is a 67 y.o. male with a history of dementia, T2DM, HTN, and polysubstance abuse who was admitted here on 12/5 for planned transmetatarsal amputation of the left foot due to osteomyelitis    Osteomyelitis of left foot  S/p Left Transmetatarsal amputation  -Patient resting in bed and in no acute distress. VSS.  -Further wound management per surgical team  -Infectious Disease consult for the am.  -Continuing Ancef for now pending ID consult this am  -Pain control  -IV fluids  -PT/OT    HTN  -Continue Norvasc-Lisinopril, and Nifedipine    T2DM  -Awaiting morning labs  -Last A1C on 10/16/22 was 7.00. Repeat in the am  -Fingerstick achs. SSI    Neurocognitive disorder  -Sitter at bedside  -prn Haldol and Ativan for agitation.           Thank you for allowing [facility] Medicine Service to provide consultative care for your patient, we will continue to follow  while clinically appropriate.    Tea Bentley PA-C  12/06/22

## 2022-12-06 NOTE — CASE MANAGEMENT/SOCIAL WORK
Discharge Planning Assessment   [place]     Patient Name: Omkar Nava  MRN: [id number]  Today's Date: 12/6/2022    Admit Date: 12/5/2022    Plan: SNF   Discharge Needs Assessment     Row Name 12/06/22 1755       Living Environment    People in Home facility resident    Name(s) of People in Home Transferred to hospital from [facility]-had been at facility since 11/16/22     Current Living Arrangements other (see comments)    Duration at Residence SNF since 11/16/22    Primary Care Provided by self;other (see comments)-SNF staff    Provides Primary Care For no one    Caregiving Concerns Currently at SNF    Family Caregiver if Needed child(mable), adult    Family Caregiver Names Daughter, Idalia Lerma @ [phone]    Quality of Family Relationships helpful;involved;supportive    Able to Return to Prior Arrangements yes    Living Arrangement Comments Prior to hospital admission was in skilled bed at [facility]       Resource/Environmental Concerns    Resource/Environmental Concerns none    Transportation Concerns none       Transition Planning    Patient/Family Anticipates Transition to inpatient rehabilitation facility    Patient/Family Anticipated Services at Transition ;rehabilitation services;other (see comments)-SNF    Transportation Anticipated family or friend will provide;other (see comments)-wheelchair van       Discharge Needs Assessment    Current Outpatient/Agency/Support Group skilled nursing facility    Equipment Currently Used at Home walker, rolling;cane, straight;wheelchair    Concerns to be Addressed discharge planning    Concerns Comments Case Management to facilitate return to SNF when medically stable    Equipment Needed After Discharge other (see comments)-TBD    Outpatient/Agency/Support Group Needs skilled nursing facility    Discharge Facility/Level of Care Needs nursing facility, skilled    Patient's Choice of Community Agency(s) [facility]    Discharge Coordination/Progress Anticipate return to [facility] when medically stable               Discharge Plan     Row Name 12/06/22 1759       Plan    Plan SNF    Patient/Family in Agreement with Plan yes  Daughter, Idalia Lerma    Plan Comments I spoke with daughter, Idalia Lerma, by telephone.  Daughter reports patient was at [facility] prior to this hospital admission-transferred to facility on 11/16/22 from [facility] for skilled rehab.  Daughter would like for her father to return to his SNF at hospital discharge, will follow up with admissions at [facility] in am regarding patient return when medically stable.    Final Discharge Disposition Code 03 - skilled nursing facility (SNF)              Continued Care and Services - Admitted Since 12/5/2022    Coordination has not been started for this encounter.     Selected Continued Care - Prior Encounters Includes continued care and service providers with selected services from prior encounters from 9/6/2022 to 12/6/2022    Discharged on 11/16/2022 Admission date: 10/14/2022 - Discharge disposition: Rehab Facility or Unit (DC - External)    Destination     Service Provider Selected Services Address Phone Fax Patient Preferred    [facility] [address] [facility] [address] [phone] --                    Expected Discharge Date and Time     Expected Discharge Date Expected Discharge Time    Dec 9, 2022          Demographic Summary     Row Name 12/06/22 1752       General Information    Referral Source admission list;interdisciplinary rounds    Reason for Consult discharge planning    Preferred Language English               Functional Status    No documentation.                Psychosocial    No documentation.                Abuse/Neglect    No documentation.                Legal    No documentation.                Substance Abuse    No documentation.                 Patient Forms    No documentation.                   LUCINDA Stout

## 2022-12-06 NOTE — DISCHARGE INSTRUCTIONS
Yeison Petty MD  Orthopedic Foot & Ankle Surgeon  [facility]    Diagnosis: Acute osteomyelitis of left foot (HCC)  Procedure Performed: Procedure(s) (LRB):  Transmetatarsal of Left Foot (Left)    What to Expect After Surgery  Diet after surgery:  Resume your diet gradually.  Begin with clear liquids and gradually progress as you feel ready.  Surgical Site Care:  Please remain in your post-operative dressing/splint until your first post op appointment with Dr. Petty.  Please make sure to keep your post-operative dressing clean and dry.  Please use a shower bag (e.g., [email or web address]) when showering or bathing to keep things dry. Wet padding can cause skin breakdown.  Do not stick anything down your cast or splint.  If you sustain a scratch, you are at higher risk for infection as casts and splints harbor more bacteria.  Follow Up Appointment: Please call the [facility] at [phone] or Dr. Petty's schedulers within two (2) days of your discharge to /confirm/verify your follow-up appointment date/time with Dr. Petty.  Typically, Dr. Petty will want to see you 14-21 days after surgery for a post-operative follow-up appointment - before 14 days, the sutures may have to stay in and you will need to return in the 14-21 day window again.  Please arrive ~15 minutes prior to your appointment time to take care of any paperwork.      Activity Precautions:  Elevate the leg above your heart as much as possible for the first two weeks after surgery.  If the leg is higher than your heart, gravity helps decrease swelling, decrease pain, and improve blood flow.  If the leg is below your heart, swelling increases, pain can worsen, and your wound is at greater risk of infection.  Keep all weight off of your surgical leg until cleared by your physician.  Use ice packs intermittently (20 minutes on, 20 minutes off) to reduce pain and swelling, however, use extreme caution  with icing if your block is still in effect.  Make sure to have a barrier between your skin and the ice pack to avoid frost bite.   If you are in a post-operative splint, you can wiggle your toes to help push swelling to your lymph ducts, but do not try to move your ankle.      Pain Management  Nerve Blocks:  to help control pain after surgery, you may have received a nerve block where the anesthesiologist injected numbing medication around the nerves that send pain signals from your foot or ankle to your brain.  This helps you feel little to no pain.   The time a nerve block lasts varies from person to person.  Often, they will last between 12 and 24 hours but could last days.  You may not be able to move your foot and/or ankle during this time.  As the block medication wears off, you may feel a tingling sensation as the nerves start to wake up.  Keep your foot and ankle safe while it is numb.  Avoid excessive heating,  scratching, etc. until the block has completely worn off.  If icing the ankle or foot, watch the toes closely to ensure that they do not become discolored (i.e., white, red or blue)  Even if you still feel no pain in your leg before you go to bed, take a dose of your narcotic medication before you go to sleep.  You do not want to wake up with the block having worn off and being behind on pain control - it is quite difficult to 'catch up' again.  Pain Medication:    Acetaminophen (Tylenol) 500 mg tablets are typically your baseline pain medication.  Take this tablet up to once every 4 hours. Do not exceed 3,000 mg of acetaminophen daily.  Note that it takes about 30-45 minutes for oral pain medication to take effect.  DO NOT drink alcoholic beverages, use recreational drugs, or use prescription sedative medications when taking pain medication.  DO NOT operate heavy machinery/drive while taking opioids.  To avoid the risk of nausea, please make sure that you are taking your medication with food.  You  may experience light headedness while taking your pain medication.  Make sure you drink plenty of water while taking narcotic pain medication to stay well hydrated and help avoid constipation.    You have been provided a Docusate prescription to help with constipation that can be a side effect of taking narcotics.  Take that medication as needed.  You have been provided a Zofran prescription to help with nausea that you can take as needed.  Itching is a common side effect to pain medication usage.  If you have an associated rash with the itching, please contact your provider.       When to Call Your Physician  Common or Normal Post-Operative Reactions:  Low grade fever (approximately 100.5 degrees) for up to one week.  Small amount of blood or fluid leaking from the surgical site or dressing  Bruising along the surgical extremity  Swelling around the surgical site and surrounding area  The reactions listed above are NORMAL, but you want to call your surgeon if any of these reactions persist.  Symptoms to Report:  Please report any of the following signs to your surgeon:  Signs of infection:  Redness or pain around your incision (if you cannot see your incision, red streaking up your extremity should be reported).  Thick, dark yellow or foul-smelling drainage at the incision site or from your dressing.  Temperature measured over 101.5 degrees for more than 24 hours despite Tylenol.  Signs of Decreased Circulation to the Ankle and Foot:  Coldness of ankle and foot  Foot or leg turning pale  Tingling/numbness (after the block has fully resolved)  Sustained increases in pain uncontrolled by medication  Toenail bed turns blue in color  Blood Clots:  Although rare, please be aware and utilize the recommendations below to avoid getting a blood clot.  Prevention of deep vein thrombus DVT (blood clots):  Continue taking your Eliquis to help prevent blood clot formation.  Get up 4-5 times during the daytime and  walk/crutch/walker across the room to keep the blood circulating in your legs. (Maintain any weightbearing restrictions, of course)  Wiggle your toes frequently if you are in a splint or case  Notify your physician if you are a nicotine user, on hormone replacement therapy medications, are taking birth control, or have a history of blood clots as these can increase your risk of developing a blood clot.  Notify your provider if you develop pain or tenderness in your calf muscle    If you have any additional questions, please contact Dr. Petty's staff the [facility] at [phone]    IF YOU HAVE AN EMERGENCY, i.e., SHORTNESS OF BREATH, CHEST PAIN, OR SYMPTOMS SEVERE IN NATURE, PLEASE CALL 911 OR VISIT YOUR LOCAL EMERGENCY ROOM

## 2022-12-07 LAB
ALBUMIN SERPL-MCNC: 2.8 G/DL (ref 3.5–5.2)
ALBUMIN/GLOB SERPL: 1 G/DL
ALP SERPL-CCNC: 73 U/L (ref 39–117)
ALT SERPL W P-5'-P-CCNC: 12 U/L (ref 1–41)
ANION GAP SERPL CALCULATED.3IONS-SCNC: 5 MMOL/L (ref 5–15)
AST SERPL-CCNC: 13 U/L (ref 1–40)
BASOPHILS # BLD AUTO: 0.03 10*3/MM3 (ref 0–0.2)
BASOPHILS NFR BLD AUTO: 0.4 % (ref 0–1.5)
BILIRUB SERPL-MCNC: <0.2 MG/DL (ref 0–1.2)
BUN SERPL-MCNC: 15 MG/DL (ref 8–23)
BUN/CREAT SERPL: 15.8 (ref 7–25)
CALCIUM SPEC-SCNC: 8.4 MG/DL (ref 8.6–10.5)
CHLORIDE SERPL-SCNC: 110 MMOL/L (ref 98–107)
CK SERPL-CCNC: 231 U/L (ref 20–200)
CO2 SERPL-SCNC: 28 MMOL/L (ref 22–29)
CREAT SERPL-MCNC: 0.95 MG/DL (ref 0.76–1.27)
CRP SERPL-MCNC: 0.95 MG/DL (ref 0–0.5)
DEPRECATED RDW RBC AUTO: 47.2 FL (ref 37–54)
EGFRCR SERPLBLD CKD-EPI 2021: 88.8 ML/MIN/1.73
EOSINOPHIL # BLD AUTO: 0.24 10*3/MM3 (ref 0–0.4)
EOSINOPHIL NFR BLD AUTO: 3 % (ref 0.3–6.2)
ERYTHROCYTE [DISTWIDTH] IN BLOOD BY AUTOMATED COUNT: 14.6 % (ref 12.3–15.4)
GLOBULIN UR ELPH-MCNC: 2.8 GM/DL
GLUCOSE BLDC GLUCOMTR-MCNC: 124 MG/DL (ref 70–130)
GLUCOSE BLDC GLUCOMTR-MCNC: 187 MG/DL (ref 70–130)
GLUCOSE BLDC GLUCOMTR-MCNC: 223 MG/DL (ref 70–130)
GLUCOSE BLDC GLUCOMTR-MCNC: 227 MG/DL (ref 70–130)
GLUCOSE SERPL-MCNC: 112 MG/DL (ref 65–99)
HCT VFR BLD AUTO: 23.9 % (ref 37.5–51)
HGB BLD-MCNC: 7.7 G/DL (ref 13–17.7)
IMM GRANULOCYTES # BLD AUTO: 0.02 10*3/MM3 (ref 0–0.05)
IMM GRANULOCYTES NFR BLD AUTO: 0.2 % (ref 0–0.5)
LYMPHOCYTES # BLD AUTO: 1.95 10*3/MM3 (ref 0.7–3.1)
LYMPHOCYTES NFR BLD AUTO: 24 % (ref 19.6–45.3)
MCH RBC QN AUTO: 28.2 PG (ref 26.6–33)
MCHC RBC AUTO-ENTMCNC: 32.2 G/DL (ref 31.5–35.7)
MCV RBC AUTO: 87.5 FL (ref 79–97)
MONOCYTES # BLD AUTO: 0.67 10*3/MM3 (ref 0.1–0.9)
MONOCYTES NFR BLD AUTO: 8.3 % (ref 5–12)
NEUTROPHILS NFR BLD AUTO: 5.21 10*3/MM3 (ref 1.7–7)
NEUTROPHILS NFR BLD AUTO: 64.1 % (ref 42.7–76)
NRBC BLD AUTO-RTO: 0 /100 WBC (ref 0–0.2)
PLATELET # BLD AUTO: 248 10*3/MM3 (ref 140–450)
PMV BLD AUTO: 10.2 FL (ref 6–12)
POTASSIUM SERPL-SCNC: 4.2 MMOL/L (ref 3.5–5.2)
PROT SERPL-MCNC: 5.6 G/DL (ref 6–8.5)
RBC # BLD AUTO: 2.73 10*6/MM3 (ref 4.14–5.8)
SODIUM SERPL-SCNC: 143 MMOL/L (ref 136–145)
VANCOMYCIN SERPL-MCNC: 19.7 MCG/ML (ref 5–40)
WBC NRBC COR # BLD: 8.12 10*3/MM3 (ref 3.4–10.8)

## 2022-12-07 PROCEDURE — 25010000002 CEFEPIME PER 500 MG: Performed by: INTERNAL MEDICINE

## 2022-12-07 PROCEDURE — 85025 COMPLETE CBC W/AUTO DIFF WBC: CPT | Performed by: INTERNAL MEDICINE

## 2022-12-07 PROCEDURE — 97162 PT EVAL MOD COMPLEX 30 MIN: CPT

## 2022-12-07 PROCEDURE — 80202 ASSAY OF VANCOMYCIN: CPT

## 2022-12-07 PROCEDURE — 99231 SBSQ HOSP IP/OBS SF/LOW 25: CPT | Performed by: INTERNAL MEDICINE

## 2022-12-07 PROCEDURE — 86140 C-REACTIVE PROTEIN: CPT | Performed by: INTERNAL MEDICINE

## 2022-12-07 PROCEDURE — 63710000001 INSULIN DETEMIR PER 5 UNITS: Performed by: INTERNAL MEDICINE

## 2022-12-07 PROCEDURE — 97110 THERAPEUTIC EXERCISES: CPT

## 2022-12-07 PROCEDURE — 82550 ASSAY OF CK (CPK): CPT | Performed by: INTERNAL MEDICINE

## 2022-12-07 PROCEDURE — 97166 OT EVAL MOD COMPLEX 45 MIN: CPT

## 2022-12-07 PROCEDURE — 25010000002 VANCOMYCIN PER 500 MG

## 2022-12-07 PROCEDURE — 97530 THERAPEUTIC ACTIVITIES: CPT

## 2022-12-07 PROCEDURE — 25010000002 CEFEPIME PER 500 MG

## 2022-12-07 PROCEDURE — 63710000001 INSULIN LISPRO (HUMAN) PER 5 UNITS: Performed by: PHYSICIAN ASSISTANT

## 2022-12-07 PROCEDURE — 82962 GLUCOSE BLOOD TEST: CPT

## 2022-12-07 PROCEDURE — 80053 COMPREHEN METABOLIC PANEL: CPT | Performed by: INTERNAL MEDICINE

## 2022-12-07 RX ORDER — HYDRALAZINE HYDROCHLORIDE 25 MG/1
25 TABLET, FILM COATED ORAL EVERY 8 HOURS SCHEDULED
Status: DISCONTINUED | OUTPATIENT
Start: 2022-12-07 | End: 2022-12-13 | Stop reason: HOSPADM

## 2022-12-07 RX ADMIN — CEFEPIME 2 G: 2 INJECTION, POWDER, FOR SOLUTION INTRAVENOUS at 17:36

## 2022-12-07 RX ADMIN — VANCOMYCIN HYDROCHLORIDE 1000 MG: 1 INJECTION, SOLUTION INTRAVENOUS at 09:33

## 2022-12-07 RX ADMIN — OLANZAPINE 5 MG: 5 TABLET, FILM COATED ORAL at 21:56

## 2022-12-07 RX ADMIN — QUETIAPINE FUMARATE 12.5 MG: 25 TABLET ORAL at 08:41

## 2022-12-07 RX ADMIN — SODIUM CHLORIDE 100 ML/HR: 9 INJECTION, SOLUTION INTRAVENOUS at 01:38

## 2022-12-07 RX ADMIN — OLANZAPINE 5 MG: 5 TABLET, FILM COATED ORAL at 08:41

## 2022-12-07 RX ADMIN — DOCUSATE SODIUM 50 MG AND SENNOSIDES 8.6 MG 2 TABLET: 8.6; 5 TABLET, FILM COATED ORAL at 21:56

## 2022-12-07 RX ADMIN — OXYCODONE 5 MG: 5 TABLET ORAL at 12:56

## 2022-12-07 RX ADMIN — INSULIN DETEMIR 15 UNITS: 100 INJECTION, SOLUTION SUBCUTANEOUS at 21:57

## 2022-12-07 RX ADMIN — CEFEPIME HYDROCHLORIDE 1 G: 1 INJECTION, POWDER, FOR SOLUTION INTRAMUSCULAR; INTRAVENOUS at 01:38

## 2022-12-07 RX ADMIN — LORAZEPAM 2 MG: 1 TABLET ORAL at 12:56

## 2022-12-07 RX ADMIN — HYDRALAZINE HYDROCHLORIDE 25 MG: 25 TABLET, FILM COATED ORAL at 21:56

## 2022-12-07 RX ADMIN — INSULIN LISPRO 3 UNITS: 100 INJECTION, SOLUTION INTRAVENOUS; SUBCUTANEOUS at 13:18

## 2022-12-07 RX ADMIN — VANCOMYCIN HYDROCHLORIDE 1000 MG: 1 INJECTION, SOLUTION INTRAVENOUS at 21:56

## 2022-12-07 RX ADMIN — INSULIN LISPRO 3 UNITS: 100 INJECTION, SOLUTION INTRAVENOUS; SUBCUTANEOUS at 17:39

## 2022-12-07 RX ADMIN — APIXABAN 2.5 MG: 2.5 TABLET, FILM COATED ORAL at 08:41

## 2022-12-07 RX ADMIN — Medication 5 MG: at 21:56

## 2022-12-07 RX ADMIN — Medication 10 ML: at 21:57

## 2022-12-07 RX ADMIN — Medication 10 ML: at 08:41

## 2022-12-07 RX ADMIN — HYDRALAZINE HYDROCHLORIDE 25 MG: 25 TABLET, FILM COATED ORAL at 08:41

## 2022-12-07 RX ADMIN — AMLODIPINE BESYLATE 10 MG: 10 TABLET ORAL at 08:41

## 2022-12-07 RX ADMIN — HYDRALAZINE HYDROCHLORIDE 25 MG: 25 TABLET, FILM COATED ORAL at 13:19

## 2022-12-07 RX ADMIN — CEFEPIME 2 G: 2 INJECTION, POWDER, FOR SOLUTION INTRAVENOUS at 11:00

## 2022-12-07 RX ADMIN — APIXABAN 2.5 MG: 2.5 TABLET, FILM COATED ORAL at 21:56

## 2022-12-07 RX ADMIN — LISINOPRIL 40 MG: 40 TABLET ORAL at 21:56

## 2022-12-07 NOTE — PLAN OF CARE
Problem: Fall Injury Risk  Goal: Absence of Fall and Fall-Related Injury  Intervention: Identify and Manage Contributors  Recent Flowsheet Documentation  Taken 12/7/2022 0200 by Erlinda Matute RN  Medication Review/Management: medications reviewed  Taken 12/7/2022 0000 by Erlinda Matute RN  Medication Review/Management: medications reviewed  Taken 12/6/2022 2000 by Erlinda Matute RN  Medication Review/Management: medications reviewed  Intervention: Promote Injury-Free Environment  Recent Flowsheet Documentation  Taken 12/7/2022 0200 by Erlinda Matute RN  Safety Promotion/Fall Prevention:   safety round/check completed   fall prevention program maintained   clutter free environment maintained   assistive device/personal items within reach   activity supervised  Taken 12/7/2022 0000 by Erlinda Matute RN  Safety Promotion/Fall Prevention:   safety round/check completed   nonskid shoes/slippers when out of bed   fall prevention program maintained   clutter free environment maintained   assistive device/personal items within reach   activity supervised  Taken 12/6/2022 2200 by Erlinda Matute RN  Safety Promotion/Fall Prevention:   safety round/check completed   fall prevention program maintained   clutter free environment maintained   assistive device/personal items within reach   activity supervised  Taken 12/6/2022 2000 by Erlinda Matute RN  Safety Promotion/Fall Prevention:   safety round/check completed   fall prevention program maintained   clutter free environment maintained   assistive device/personal items within reach   activity supervised   Goal Outcome Evaluation:   A much better night tonight given he took his HS meds. Continued IV fluids and ABX. VSS with no c/o pain or discomfort. He appears to have rested well all shift with friend at bedside. Will CTM and provide care.

## 2022-12-07 NOTE — PROGRESS NOTES
[facility] Medicine Services  PROGRESS NOTE    Patient Name: Omkar Nava  : 1955  MRN: [id number]    Date of Admission: 2022  Primary Care Physician: Provider, No Known    Subjective   Subjective     CC:  Med management    HPI:  Family friend at bedside feeding him breakfast    ROS:  Limited ros due to mental status, no events per caregiver     Objective   Objective     Vital Signs:   Temp:  [97.7 °F (36.5 °C)-98.4 °F (36.9 °C)] 98.4 °F (36.9 °C)  Heart Rate:  [63-72] 64  Resp:  [17] 17  BP: (129-161)/(63-81) 138/63  Flow (L/min):  [2-3] 2     Physical Exam:  Constitutional: chronically ill appearing awake  HENT: NCAT, mucous membranes moist  Respiratory: Clear to auscultation bilaterally, respiratory effort normal   Cardiovascular: RRR on tele  Gastrointestinal: Soft, nontender, nondistended  Musculoskeletal: muscle atrophy, L foot wrapped  Psychiatric: Appropriate affect, cooperative  Neurologic: Oriented to self, speaking intermittently  Skin: No rashes      Results Reviewed:  LAB RESULTS:      Lab 22  0811 22  0503 22  1138 22  1458   WBC 8.12 11.88* 8.63 7.44   HEMOGLOBIN 7.7* 8.2* 9.8* 9.0*   HEMATOCRIT 23.9* 25.0* 30.4* 27.3*   PLATELETS 248 287 333 295   NEUTROS ABS 5.21  --   --  4.80   IMMATURE GRANS (ABS) 0.02  --   --  0.02   LYMPHS ABS 1.95  --   --  1.77   MONOS ABS 0.67  --   --  0.64   EOS ABS 0.24  --   --  0.17   MCV 87.5 86.2 86.6 84.5   CRP 0.95*  --   --  0.88*         Lab 22  0811 22  0701 22  1138   SODIUM 143 139 141   POTASSIUM 4.2 4.4 4.3   CHLORIDE 110* 103 105   CO2 28.0 26.0 28.0   ANION GAP 5.0 10.0 8.0   BUN 15 20 22   CREATININE 0.95 1.03 1.03   EGFR 88.8 80.6 80.6   GLUCOSE 112* 273* 167*   CALCIUM 8.4* 8.9 9.8         Lab 22  0811   TOTAL PROTEIN 5.6*   ALBUMIN 2.80*   GLOBULIN 2.8   ALT (SGPT) 12   AST (SGOT) 13   BILIRUBIN <0.2   ALK PHOS 73                     Brief Urine Lab Results  (Last  result in the past 365 days)      Color   Clarity   Blood   Leuk Est   Nitrite   Protein   CREAT   Urine HCG        06/12/22 2056 Yellow   Clear     Negative                     Microbiology Results Abnormal     Procedure Component Value - Date/Time    AFB Culture - Tissue, Toe, Left [[id number]] Collected: 12/05/22 1837    Lab Status: Preliminary result Specimen: Tissue from Toe, Left Updated: 12/06/22 1328     AFB Stain No acid fast bacilli seen on concentrated smear          No radiology results from the last 24 hrs        I have reviewed the medications:  Scheduled Meds:amLODIPine, 10 mg, Oral, Q24H  apixaban, 2.5 mg, Oral, BID  cefepime, 2 g, Intravenous, Q8H  hydrALAZINE, 25 mg, Oral, Q8H  insulin detemir, 15 Units, Subcutaneous, Nightly  insulin lispro, 0-7 Units, Subcutaneous, TID AC  lisinopril, 40 mg, Oral, Daily  melatonin, 5 mg, Oral, Nightly  OLANZapine, 5 mg, Oral, BID  QUEtiapine, 12.5 mg, Oral, Daily  senna-docusate sodium, 2 tablet, Oral, Nightly  sodium chloride, 10 mL, Intravenous, Q12H  vancomycin, 1,000 mg, Intravenous, Q12H      Continuous Infusions:lactated ringers, 9 mL/hr, Last Rate: 125 mL/hr (12/05/22 1657)  Pharmacy Consult,   Pharmacy to dose vancomycin,       PRN Meds:.•  acetaminophen **OR** acetaminophen  •  bisacodyl  •  dextrose  •  dextrose  •  glucagon (human recombinant)  •  haloperidol  •  HYDROmorphone **AND** naloxone  •  LORazepam  •  melatonin  •  ondansetron **OR** ondansetron  •  oxyCODONE  •  Pharmacy Consult  •  Pharmacy to dose vancomycin  •  polyethylene glycol  •  sodium chloride  •  sodium chloride    Assessment & Plan   Assessment & Plan     Active Hospital Problems    Diagnosis  POA   • **Acute osteomyelitis of left foot (HCC) [M86.172]  Yes   • Neurocognitive disorder [R41.9]  Yes   • Type 2 diabetes mellitus (HCC) [E11.9]  Yes   • Essential hypertension [I10]  Yes      Resolved Hospital Problems   No resolved problems to display.        Brief Hospital Course to  date:  Omkar Nava is a 67 y.o. male with a history of dementia, T2DM, HTN, and polysubstance abuse who was admitted here on 12/5 for planned transmetatarsal amputation of the left foot due to osteomyelitis     Osteomyelitis of left foot  S/p Left Transmetatarsal amputation  -S/p transmetatarsal amputation of left foot on 12/5 by orthopedics  -ID following, continue antibiotics  -Pain control  -IV fluids  -PT/OT     HTN  -continue norvasc, higher dose lisinopril  -add hydralazine TID  -heplock IV     T2DM  -continue levemir and SSI     Neurocognitive disorder, psychosis  -prn Haldol and Ativan for agitation        Expected Discharge Location and Transportation: home with caregivers  Expected Discharge Date: per ortho    DVT prophylaxis:  Medical and mechanical DVT prophylaxis orders are present.     AM-PAC 6 Clicks Score (PT): 18 (12/06/22 0800)    CODE STATUS:   There are no questions and answers to display.       Marilyn Chopra, DO  12/07/22

## 2022-12-07 NOTE — PLAN OF CARE
Problem: Adult Inpatient Plan of Care  Goal: Plan of Care Review  Recent Flowsheet Documentation  Taken 12/7/2022 1306 by Steffi Carpenter OT  Progress: no change  Plan of Care Reviewed With:   patient   sibling  Outcome Evaluation: OT evaluation completed with pt. demonstrating limited alertness and with impaired strength, vision balance, endurance, coordination and cognition impacting ADL independence and related transfers.  Pt. grossly max of 2 with bed mobility and parital stand, lift to recliner.  Pt. mod to dependence with BADL task completion.  Skilled OT services appropriate to address deficit areas and recommend return to rehab at discharge.  Goal Outcome Evaluation:

## 2022-12-07 NOTE — PROGRESS NOTES
INFECTIOUS DISEASE PROGRESS NOTE    Omkar Nava  1955  [id number]    Consult: 12/6/22, 10/15/22  Admit date: 12/5/2022    Requesting Provider: Omkar Seth MD  Evaluating physician:  Rayray Gordon MD  Reason for Consultation: left foot osteomyelitis, first toe, distal phalanx, reoperation with transmetatarsal amputation 12/5/2022 for poor wound healing and exposed bone  Chief Complaint: left foot pain      Subjective   History of present illness:  Patient is a  67 y.o. male with h/o T2DM, PN, HTN, normocytic anemia, and GERD who presented to [facility] ED on 10/14 for worsening left foot pain.  The patient is a poor historian and most of the information was taken from the chart.  He tripped at his nursing facility, [facility], while running the street in flip-flops and sustained a laceration of his first webspace.  He was taken to [facility] ED on 9/17/22 and found to have a small intra-articular vertical fracture through the base the the great toe proximal phalanx. Podiatry, Dr. Ruiz, saw patient and the wound with irrigated with suture placement.  The area was dressed and he was placed in a surgical shoe.  He was given Cefazolin x 1 dose and discharged back to his facility on Keflex for 7 days.  On 9/26/22, the nursing staff at increased pain, swelling, and redness around the foot and sent him to [facility] ED on that day.  He was found to have a displaced fracture of proximal phalanx of left hallux along with abscess and gas in tissues.  He was admitted to the hospital and underwent Left foot debridement and I and D on 9/27/22 by Dr. Ruiz with culture positive for MRSA (Resistant to Cefazolin, clindamycin, erm, oxacillin, tetracycline, Bactrim and sensitive to Vancomycin, Daptomycin-KB 1, and Linezolid KB 2), Corynebacterium striatum, and Enterococcus faecalis (sens to Vanc and ampicillin).  He underwent a second debridement and I and D on 9/30.  He was given  Cefazolin in ED and then changed to Daptomycin.  A PICC line was placed on 10/3 for Daptomycin infusions, but the antibiotic was too expensive for the SNF, so he was changed to oral Zyvox and Rifampin until 10/26.  The PICC line was removed on 10/4.  His blood cultures remained negative.  He was discharged back to his nursing facility on 10/4/22.      He was sent to [facility] ED on 10/14 after nursing staff noted that his left hallux appeared necrotic.  He has had no fever, chills, shortness of breath, nausea, vomiting, diarrhea, dysuria, or worsening foot pain.  On arrival, he is afebrile and hemodynamically stable.  On 10/15, his BP went up to 203/81.  Admitting labs were WBC 7200 with 69% neutrophils, ESR 67, CRP 3.07, Hgb 9.2, PCT 0.05, lactic acid 2.3, and creatinine 1.21.  A MRSA PCR was negative.  Blood and wound cultures are pending.  An MRI of the left foot on 10/15 showed wound around the left great toe with marrow signal alteration within the distal aspect of 1st MT as well as both phalanges c/w osteomyelitis and scattered foci in soft tissues that may be foci of gas without evidence of fluid collections or abscesses. An Xray of left foot showed soft tissue swelling of 1st digit with displaced fracture of medial base of 1st proximal phalanx.  He is currently on Cefepime and Vancomycin.  ID was asked by Dr. Seth on 10/15 to evaluate and manage his antibiotic therapy.  Patient has had some issues with psychosis over the past 6 months possibly related to drug and alcohol use.  This is also interfered with his acceptance of care.    The patient underwent a left first ray resection on 10/18/2022.  The patient was subsequently treated with vancomycin and ceftriaxone until 11/25/2022 for cultures which grew from his left foot with MRSA, Klebsiella, and Raoultella ornithinolytica.  His foot healed and then he developed left foot wound dehiscence with exposed bone.  He was readmitted to [facility] on  12/5/2022 and underwent a transmetatarsal amputation.  I was consulted on 12/6/2022 for further evaluation and treatment.  Patient is a poor historian.  He has no other localizing signs or symptoms of infection.    12/7/2022 history reviewed.  No high fevers or chills.  Pain controlled left foot.  Tolerating vancomycin and cefepime until 1/18/2023 or shorter.  Status post left foot transmetatarsal amputation 12/5.    Past Medical History:   Diagnosis Date    Anemia     Dementia (HCC)     Diabetes mellitus (HCC)     Dysphagia     GERD (gastroesophageal reflux disease)     History of alcohol abuse     History of cocaine use     History of marijuana use     Hypertension     Osteomyelitis (HCC)     Poor historian     records obtained from nursing home records & his family    Visual impairment    HTN  Psychotic d/o, unspecified.    Past Surgical History:   Procedure Laterality Date    AMPUTATION FOOT Left 10/18/2022    Procedure: PARTIAL FIRST RAY AMPUTATION LEFT;  Surgeon: Yeison Petty MD;  Location:  [facility];  Service: Orthopedics;  Laterality: Left;    AMPUTATION FOOT Left 12/5/2022    Procedure: Transmetatarsal of Left Foot;  Surgeon: Yeison Petty MD;  Location:  [facility];  Service: Orthopedics;  Laterality: Left;    EYE SURGERY     Left foot debridement/I and D x 2 9/27/22 and 9/30/22.  Left first ray amputation 10/18/2022.    Pediatric History   Patient Parents    Not on file     Other Topics Concern    Not on file   Social History Narrative    Caffeine 0-1 servings per day    Patient lives at home .   Patient lives at [facility]  Former smoker, no alcohol, smokes marijuana.     family history includes Diabetes in his brother, brother, father, maternal grandfather, maternal grandmother, mother, and sister; Hypertension in his brother, brother, father, and mother.    No Known Allergies      There is no immunization history on file for this patient.    Medication:    Current Facility-Administered  Medications:     acetaminophen (TYLENOL) tablet 650 mg, 650 mg, Oral, Q4H PRN **OR** acetaminophen (TYLENOL) suppository 650 mg, 650 mg, Rectal, Q4H PRN, Yeison Petty MD    amLODIPine (NORVASC) tablet 10 mg, 10 mg, Oral, Q24H, 10 mg at 12/07/22 0841 **AND** [DISCONTINUED] lisinopril (PRINIVIL,ZESTRIL) tablet 20 mg, 20 mg, Oral, Q24H, Yeison Petty MD, 20 mg at 12/06/22 0918    apixaban (ELIQUIS) tablet 2.5 mg, 2.5 mg, Oral, BID, Yeison Petty MD, 2.5 mg at 12/07/22 0841    bisacodyl (DULCOLAX) suppository 10 mg, 10 mg, Rectal, Daily PRN, Yeison Petty MD    cefepime (MAXIPIME) 1 g/100 mL 0.9% NS IVPB (mbp), 1 g, Intravenous, Q8H, Ijeoma Kuhn PharmD, 1 g at 12/07/22 0138    dextrose (D50W) (25 g/50 mL) IV injection 25 g, 25 g, Intravenous, Q15 Min PRN, Tea Bentley PA-C    dextrose (GLUTOSE) oral gel 15 g, 15 g, Oral, Q15 Min PRN, Tea Bentley PA-C    glucagon (human recombinant) (GLUCAGEN DIAGNOSTIC) injection 1 mg, 1 mg, Intramuscular, Q15 Min PRN, Tea Bentley PA-C    haloperidol (HALDOL) 2 MG/ML solution 2 mg, 2 mg, Oral, Daily PRN, Yeison Petty MD    hydrALAZINE (APRESOLINE) tablet 25 mg, 25 mg, Oral, Q8H, Marilyn Chopra DO, 25 mg at 12/07/22 0841    HYDROmorphone (DILAUDID) injection 0.5 mg, 0.5 mg, Intravenous, Q2H PRN, 0.5 mg at 12/06/22 0714 **AND** naloxone (NARCAN) injection 0.1 mg, 0.1 mg, Intravenous, Q5 Min PRN, Yeison Petty MD    insulin detemir (LEVEMIR) injection 15 Units, 15 Units, Subcutaneous, Nightly, Mini, MD Diane, 15 Units at 12/06/22 1818    Insulin Lispro (humaLOG) injection 0-7 Units, 0-7 Units, Subcutaneous, TID AC, Tea Bentley PA-C, 4 Units at 12/06/22 1733    lactated ringers infusion, 9 mL/hr, Intravenous, Continuous, Yeison Petty MD, Last Rate: 125 mL/hr at 12/05/22 1657, New Bag at 12/05/22 1657    lisinopril (PRINIVIL,ZESTRIL) tablet 40 mg, 40 mg, Oral, Daily, Yeison Petty MD, 40 mg at 12/06/22 2207     LORazepam (ATIVAN) tablet 2 mg, 2 mg, Oral, Q6H PRN, Yeison Petty MD, 2 mg at 12/06/22 0715    melatonin tablet 5 mg, 5 mg, Oral, Nightly, Yeison Petty MD, 5 mg at 12/06/22 2207    melatonin tablet 5 mg, 5 mg, Oral, Nightly PRN, Yeison Petty MD    OLANZapine (zyPREXA) tablet 5 mg, 5 mg, Oral, BID, Yeison Petty MD, 5 mg at 12/07/22 0841    ondansetron (ZOFRAN) tablet 4 mg, 4 mg, Oral, Q6H PRN **OR** ondansetron (ZOFRAN) injection 4 mg, 4 mg, Intravenous, Q6H PRN, Yeison Petty MD    oxyCODONE (ROXICODONE) immediate release tablet 5 mg, 5 mg, Oral, Q4H PRN, Yeison Petty MD    Pharmacy Consult, , Does not apply, Continuous PRN, Rayray Gordon MD    Pharmacy to dose vancomycin, , Does not apply, Continuous PRN, Rayray Gordon MD    polyethylene glycol (MIRALAX) packet 17 g, 17 g, Oral, PRN, Yeison Petty MD    QUEtiapine (SEROquel) tablet 12.5 mg, 12.5 mg, Oral, Daily, Tea Bentley PA-C, 12.5 mg at 12/07/22 0841    sennosides-docusate (PERICOLACE) 8.6-50 MG per tablet 2 tablet, 2 tablet, Oral, Nightly, Yeison Petty MD, 2 tablet at 12/06/22 2207    sodium chloride 0.9 % flush 10 mL, 10 mL, Intravenous, Q12H, Yeison Petty MD, 10 mL at 12/07/22 0841    sodium chloride 0.9 % flush 10 mL, 10 mL, Intravenous, PRN, Yeison Petty MD    sodium chloride 0.9 % infusion 40 mL, 40 mL, Intravenous, PRN, Yeison Petty MD    vancomycin (VANCOCIN) 1000 mg/200 mL dextrose 5% IVPB, 1,000 mg, Intravenous, Q12H, Ijeoma Kuhn, PharmD, 1,000 mg at 12/06/22 1922    Please refer to the medical record for a full medication list    Review of Systems:  Unable to get a reliable history as patient is responding with yes and no to basic questions and has a psychotic disorder.  Denies fever, chills, nausea, vomiting, diarrhea, shortness of breath, dysuria, or rashes.  Denies pain.    Physical Exam:   Vital Signs   Temp:  [97.7 °F (36.5 °C)-98.4 °F (36.9 °C)] 97.7 °F (36.5 °C)  Heart Rate:  [63-78] 63  Resp:   "[17] 17  BP: (123-161)/(59-81) 161/80    Temp  Min: 97.7 °F (36.5 °C)  Max: 98.4 °F (36.9 °C)  BP  Min: 123/59  Max: 161/80  Pulse  Min: 63  Max: 78  Resp  Min: 17  Max: 17  SpO2  Min: 96 %  Max: 98 %    Blood pressure 161/80, pulse 63, temperature 97.7 °F (36.5 °C), temperature source Temporal, resp. rate 17, height 182.9 cm (72\"), weight 81.6 kg (180 lb), SpO2 98 %.  GENERAL: Awake and alert, in moderate distress. Appears older than stated age.  Resting in bed.  Trying to watch TV.  HEENT:  Normocephalic, atraumatic.  Oropharynx without thrush.   EYES: PERRLA.  No conjunctival injection. No icterus. EOM full.  LYMPHATICS: No lymphadenopathy of the neck or axillary or inguinal regions.   HEART: No murmur, gallop, or pericardial friction rub. Reg rate rhythm.  No JVD.  LUNGS: Clear to auscultation and percussion. No respiratory distress, no use of accessory muscles.  No rhonchi.  ABDOMEN: Soft, nontender, nondistended. No appreciable HSM.  Bowel sounds normal.  No masses.  : No CVA tenderness, external genital lesions, rectal per HPI.  SKIN: Warm and dry without cutaneous eruptions.   Left foot dressing in place, right arm PICC okay placed 10/27.  Splint in place.  No drainage.  PSYCHIATRIC: Mental status with occasional confusion.  But overall pleasant.  Follows commands.    EXT: Left foot surgical dressing in place.  No crepitus, or foul smell.  NEURO: Oriented to name, nonfocal.  Follows some commands and pleasant with me.  Nonfocal.        Results Review:   I reviewed the patient's new clinical results.  I reviewed the patient's new imaging results and agree with the interpretation.  I reviewed the patient's other test results and agree with the interpretation    Results from last 7 days   Lab Units 12/07/22  0811 12/06/22  0503 12/05/22  1138   WBC 10*3/mm3 8.12 11.88* 8.63   HEMOGLOBIN g/dL 7.7* 8.2* 9.8*   HEMATOCRIT % 23.9* 25.0* 30.4*   PLATELETS 10*3/mm3 248 287 333     Results from last 7 days   Lab " Units 12/07/22  0811   SODIUM mmol/L 143   POTASSIUM mmol/L 4.2   CHLORIDE mmol/L 110*   CO2 mmol/L 28.0   BUN mg/dL 15   CREATININE mg/dL 0.95   GLUCOSE mg/dL 112*   CALCIUM mg/dL 8.4*     Results from last 7 days   Lab Units 12/07/22  0811   ALK PHOS U/L 73   BILIRUBIN mg/dL <0.2   ALT (SGPT) U/L 12   AST (SGOT) U/L 13         Results from last 7 days   Lab Units 12/07/22  0811   CRP mg/dL 0.95*     Results from last 7 days   Lab Units 12/07/22  0811   VANCOMYCIN RM mcg/mL 19.70         Estimated Creatinine Clearance: 89.5 mL/min (by C-G formula based on SCr of 0.95 mg/dL).  CPK      Common Labsle 12/7/22   Creatine Kinase 231 (A)   (A) Abnormal value               Procalitonin Results:       Brief Urine Lab Results  (Last result in the past 365 days)        Color   Clarity   Blood   Leuk Est   Nitrite   Protein   CREAT   Urine HCG        06/12/22 2056 Yellow   Clear     Negative                      No results found for: SITE, ALLENTEST, PHART, TZR2XCN, PO2ART, EVP5XMU, BASEEXCESS, G6ZPWXHM, HGBBG, HCTABG, OXYHEMOGLOBI, METHHGBN, CARBOXYHGB, CO2CT, BAROMETRIC, MODALITY, FIO2     Microbiology:  Microbiology Results (last 10 days)       Procedure Component Value - Date/Time    Tissue / Bone Culture - Tissue, Toe, Left [[id number]] Collected: 12/05/22 1837    Lab Status: Preliminary result Specimen: Tissue from Toe, Left Updated: 12/06/22 0911     Tissue Culture Growth present, too young to evaluate     Gram Stain Moderate (3+) WBCs seen      No organisms seen    AFB Culture - Tissue, Toe, Left [[id number]] Collected: 12/05/22 1837    Lab Status: Preliminary result Specimen: Tissue from Toe, Left Updated: 12/06/22 1328     AFB Stain No acid fast bacilli seen on concentrated smear          Blood and wound cultures 10/14 pending.       No results found for: TISSCXQ, CULTURES, CULTURE, BLOODCX, ANACX, BALCX, ACIDFASTCX, BODYFLDCX, CXREFLEX, FUNGUSCX, RESPCX, MRSACX, ROUTCX, BRCHWSHCLT, TISSUECX, URINECX, CMVCX, WOUNDCX,  BCIDPCR, BFCULTURE, BLOODCULT(      Radiology:  Imaging Results (Last 72 Hours)       ** No results found for the last 72 hours. **            IMPRESSION:   Left hallux osteomyelitis after trauma/displaced fracture 9/17/22.  Developed gangrene and poor wound healing related to vascular disease and noncompliance.  Improved after surgery 10/18 with ray resection but then had dehiscence of wound with exposed bone.  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  Cx with MRSA, Enterococcus faecalis, and Corynebacterium striatum.  Initially treated with Daptomycin and discharge on oral Zyvox and oral Rifampin until 10/26/22.  Cx with Raoultella ornithinolytica (sens to Cefepime, ceftriaxone) and Klebsiella.  Status post left first ray resection 10/18/2022.   Left foot wound dehiscence with exposed metatarsal new on 12/4, status post transmetatarsal amputation 12/5/2022.   Elevated inflammatory markers, related to above.  CRP 3.07 on 10/14.  CRP 3.10 on 10/26.  CRP 0.88 on 11/30.  Type 2 diabetes mellitus/peripheral neuropathy.  Affects wound healing.  Hyperglycemia, related to above.  Anemia of chronic disease.  Worse, and acute postoperative blood loss.  Essential hypertension.    Gastroesophageal reflux disease.  Psychosis since April 2022 reported by the patient's sister, thought to be related to drug and alcohol use.  Was hospitalized previously at [facility].  Awaiting placement.  Has interfered with his care.  Leukocytosis, neutrophilic, resolved.  Hypocalcemia 8.4, new.    Plan:  Diagnostically, follow blood and wound cultures, imaging, physical examination, CBC, CMP, CRP, and cultures which were obtained at surgery on 12/5/2022.   Therapeutically, continue vancomycin and cefepime awaiting culture data..  Duration x6 weeks until 1/18/2021.  Orthopedic surgery status post left first ray resection for osteomyelitis 10/18/2022, 12/5/2022.   Previously was a  difficult placement.    Case management orders: Please arrange for skilled facility administration of antibiotics with vancomycin 1 g IV every 12 hours (keep trough between 12 and 18), cefepime 2 g IV every 8 hours to continue until 1/18/2023 for 6 weeks treatment of left foot osteomyelitis.  Check CBC, CMP, CRP, vancomycin trough weekly while on IV antibiotics.  Weekly PICC dressing changes.  Schedule follow-up with me in 2 weeks post discharge.    I discussed the patient's findings and my recommendations with patient and previously with his family.  He will need to be in a skilled facility to receive his antibiotics.    Our group would be pleased to follow this patient over the course of their hospitalization and assist with outpatient antimicrobial therapy, as indicated.  Side effects of medications discussed with nursing and previously with family.  Further recommendations depend on the results of the cultures and clinical course. Discussed with patient's sister and family.  Difficult issue with compliance in terms of wound care and staying off his foot.      Rayray Gordon MD  12/7/2022I

## 2022-12-07 NOTE — PROGRESS NOTES
"          Orthopaedic Surgery Progress Note    LOS: 2 days   Patient Care Team:  Provider, No Known as PCP - General  2 Days Post-Op   Procedure(s):  Transmetatarsal amputation of Left Foot  Subjective   Interval History:   Resting comfortably in bed. Per nurse patient had a \"good\" night. No acute events.     Objective     Vital Signs:  Temp (24hrs), Av °F (36.7 °C), Min:97.7 °F (36.5 °C), Max:98.4 °F (36.9 °C)    /80 (BP Location: Right arm, Patient Position: Lying)   Pulse 63   Temp 97.7 °F (36.5 °C) (Temporal)   Resp 17   Ht 182.9 cm (72\")   Wt 81.6 kg (180 lb)   SpO2 98%   BMI 24.41 kg/m²     Labs:  Lab Results (last 24 hours)     Procedure Component Value Units Date/Time    POC Glucose Once [[id number]]  (Normal) Collected: 22 0757    Specimen: Blood Updated: 22 0758     Glucose 124 mg/dL      Comment: Meter: [id number] : [id number] FoundValue       POC Glucose Once [[id number]]  (Abnormal) Collected: 22 171    Specimen: Blood Updated: 22 171     Glucose 286 mg/dL      Comment: Meter: [id number] : [id number] [employer] [place]       AFB Culture - Tissue, Toe, Left [[id number]] Collected: 22 183    Specimen: Tissue from Toe, Left Updated: 22 1328     AFB Stain No acid fast bacilli seen on concentrated smear    POC Glucose Once [[id number]]  (Abnormal) Collected: 22 1132    Specimen: Blood Updated: 22 1138     Glucose 278 mg/dL      Comment: Meter: [id number] : [id number] [employer]       CK [[id number]]  (Abnormal) Collected: 22 0701    Specimen: Blood Updated: 22 0956     Creatine Kinase 424 U/L     Tissue / Bone Culture - Tissue, Toe, Left [[id number]] Collected: 22 183    Specimen: Tissue from Toe, Left Updated: 22 0911     Tissue Culture Growth present, too young to evaluate     Gram Stain Moderate (3+) WBCs seen      No organisms seen          Physical Exam:  left LE: Splint taken down for " exam   Leg compartments soft/compressible   Skin at amp site WWP   Sutures in place, dried blood with no new drainage at incision site, no erythema   CR brisk at skin edges at incision site     Assessment/Plan:  2 Days Post-Op status post:  Procedure(s):  Transmetatarsal amputation of Left Foot    -NWB operative extremity  -Advance diet as tolerated  -Keep splint/operative dressing clean and dry  -DVT prophylaxis, Eliquis  -Continue antibiotics per ID recommendations, appreciate ID recs  -Pain control  -Follow-up Intra-Op cultures  -Elevate operative extremity  -Okay for DC from ortho standpoint, f/u information and DC instructions placed in chart, f/u in clinic in 2 weeks for wound check  -Rest of management per medicine team    Yeison Petty MD  12/07/22  08:13 EST

## 2022-12-07 NOTE — THERAPY EVALUATION
Patient Name: Omkar Nava  : 1955    MRN: [id number]                              Today's Date: 2022       Admit Date: 2022    Visit Dx:     ICD-10-CM ICD-9-CM   1. 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, without long-term current use of insulin   • Orthostatic hypotension   • Acute osteomyelitis of left foot (HCC)   • Type 2 diabetes mellitus (HCC)   • Essential hypertension   • Psychotic disorder (HCC)   • Neurocognitive disorder     Past Medical History:   Diagnosis Date   • Anemia    • Dementia (HCC)    • Diabetes mellitus (HCC)    • Dysphagia    • GERD (gastroesophageal reflux disease)    • History of alcohol abuse    • History of cocaine use    • History of marijuana use    • Hypertension    • Osteomyelitis (HCC)    • Poor historian     records obtained from nursing home records & his family   • Visual impairment      Past Surgical History:   Procedure Laterality Date   • AMPUTATION FOOT Left 10/18/2022    Procedure: PARTIAL FIRST RAY AMPUTATION LEFT;  Surgeon: Yeison Petty MD;  Location:  [facility] OR;  Service: Orthopedics;  Laterality: Left;   • AMPUTATION FOOT Left 2022    Procedure: Transmetatarsal of Left Foot;  Surgeon: Yeison Petty MD;  Location:  [facility] OR;  Service: Orthopedics;  Laterality: Left;   • EYE SURGERY        General Information     Row Name 22 1256          OT Time and Intention    Document Type evaluation  -CHRISTI     Mode of Treatment occupational therapy;co-treatment  partial overlap due to NWB, confused and limited alertness  -CHRISTI     Row Name 22 1256          General Information    Patient Profile Reviewed yes  -CHRISTI     Prior Level of Function --  Pt. came from SNF.  Per family pt. walked prior to October sx. , but needed assist ADL.   Plans back to rehab.  -CHRISTI     Existing Precautions/Restrictions fall;non-weight bearing;left  L BKA, NWB  -CHRISTI     Barriers to Rehab previous functional deficit;cognitive status;visual deficit  -CHRISTI     Row Name 12/07/22 1256          Occupational Profile    Environmental Supports and Barriers (Occupational Profile) plans back to rehab facility  -CHRISTI     Row Name 12/07/22 1256          Living Environment    People in Home facility resident  came from rehab and plans to return there at discharge  -CHRISTI     Row Name 12/07/22 1256          Cognition    Orientation Status (Cognition) oriented to;person;disoriented to;place;time  pt. knew month, but not year  -CHRISTI     Row Name 12/07/22 1256          Safety Issues, Functional Mobility    Safety Issues Affecting Function (Mobility) ability to follow commands;insight into deficits/self-awareness;safety precautions follow-through/compliance;judgment;safety precaution awareness;sequencing abilities  -CHRISTI     Impairments Affecting Function (Mobility) cognition;endurance/activity tolerance;coordination;strength;visual/perceptual;balance  -CHRISTI           User Key  (r) = Recorded By, (t) = Taken By, (c) = Cosigned By    Initials Name Provider Type    Steffi Ennis OT Occupational Therapist                 Mobility/ADL's     Row Name 12/07/22 1259          Bed Mobility    Bed Mobility supine-sit;scooting/bridging;rolling left;rolling right  -CHRISTI     Rolling Left [place] (Bed Mobility) maximum assist (25% patient effort);2 person assist;verbal cues;nonverbal cues (demo/gesture)  -CHRISTI     Rolling Right [place] (Bed Mobility) maximum assist (25% patient effort);2 person assist;verbal cues;nonverbal cues (demo/gesture)  -CHRISTI     Scooting/Bridging [place] (Bed Mobility) maximum assist (25% patient effort);2 person assist;verbal cues;nonverbal cues (demo/gesture)  pt. able to push some with RLE  -CHRISTI     Supine-Sit [place] (Bed Mobility) maximum assist (25% patient  effort);2 person assist;verbal cues;nonverbal cues (demo/gesture)  -     Bed Mobility, Safety Issues cognitive deficits limit understanding;decreased use of arms for pushing/pulling;decreased use of legs for bridging/pushing;impaired trunk control for bed mobility  -     Assistive Device (Bed Mobility) bed rails;draw sheet  -     Comment, (Bed Mobility) pt. needed step by setp cues for all, assist to HOB, rolling for brief placement  -     Row Name 12/07/22 1259          Transfers    Transfers bed-chair transfer;sit-stand transfer;stand-sit transfer  -     Row Name 12/07/22 1259          Bed-Chair Transfer    Bed-Chair [place] (Transfers) dependent (less than 25% patient effort);2 person assist  -     Assistive Device (Bed-Chair Transfers) lift device  -CHRISTI     Comment, (Bed-Chair Transfer) sit sling placed under pt. at EOB  -     Row Name 12/07/22 1259          Sit-Stand Transfer    Sit-Stand [place] (Transfers) maximum assist (25% patient effort);2 person assist  -CHRISTI     Comment, (Sit-Stand Transfer) attempted walker without success, with BUE support cleanered buttocks grossly 2 inches, at first NWB LLE, but when started to bear weight resat pt.  -     Row Name 12/07/22 1259          Stand-Sit Transfer    Stand-Sit [place] (Transfers) maximum assist (25% patient effort);2 person assist;verbal cues;nonverbal cues (demo/gesture)  -     Assistive Device (Stand-Sit Transfers) other (see comments)  BUE support  -     Row Name 12/07/22 1259          Activities of Daily Living    BADL Assessment/Intervention lower body dressing;upper body dressing;grooming  -     Row Name 12/07/22 1259          Mobility    Extremity Weight-bearing Status left lower extremity  -     Left Lower Extremity (Weight-bearing Status) non weight-bearing (NWB)  -     Row Name 12/07/22 1259          Lower Body Dressing Assessment/Training    [place] Level (Lower Body Dressing) don;socks;dependent (less  than 25% patient effort)  -CHRISTI     Position (Lower Body Dressing) supine  -CHRISTI     Comment, (Lower Body Dressing) rolled also for dependent brief placement  -     Row Name 12/07/22 1259          Upper Body Dressing Assessment/Training    [place] Level (Upper Body Dressing) don;maximum assist (25% patient effort);verbal cues  -CHRISTI     Position (Upper Body Dressing) supine  -CHRISTI     Comment, (Upper Body Dressing) gown donned due to pt. undressed on arrival  -     Row Name 12/07/22 1259          Grooming Assessment/Training    [place] Level (Grooming) wash face, hands;moderate assist (50% patient effort);verbal cues;nonverbal cues (demo/gesture)  -CHRISTI     Position (Grooming) supported sitting  -CHRISTI     Comment, (Grooming) assist with hands  -CHRISTI           User Key  (r) = Recorded By, (t) = Taken By, (c) = Cosigned By    Initials Name Provider Type    Steffi Ennis, OT Occupational Therapist               Obj/Interventions     Row Name 12/07/22 1303          Sensory Assessment (Somatosensory)    Sensory Assessment (Somatosensory) bilateral UE  -CHRISTI     Bilateral UE Sensory Assessment general sensation  -     Sensory Assessment per pt. dulled sensation in hands, see PT note for LE  -CHRISTI     Row Name 12/07/22 1303          Vision Assessment/Intervention    Visual Impairment/Limitations corrective lenses for reading  -     Vision Assessment Comment Pt. difficulty maintaining attention and alertness to keep eyes opened. Slow to track OT L and R and could not name number of fingers accuratly L or R.  Per sister pt. needs glassess.  -     Row Name 12/07/22 1303          Range of Motion Comprehensive    General Range of Motion bilateral upper extremity ROM WFL  -     Row Name 12/07/22 1303          Strength Comprehensive (MMT)    General Manual Muscle Testing (MMT) Assessment upper extremity strength deficits identified  -CHRISTI     Comment, General Manual Muscle Testing (MMT) Assessment Pt. give grossly 4+/5  effort, but unable to maintain power with TE, ADL or mobility completion.  -     Row Name 12/07/22 1303          Shoulder (Therapeutic Exercise)    Shoulder (Therapeutic Exercise) AAROM (active assistive range of motion)  -     Shoulder AAROM (Therapeutic Exercise) bilateral;flexion;extension;aBduction;aDduction;horizontal aBduction/aDduction;10 repetitions;sitting  varied AROM to AAROM  -     Row Name 12/07/22 1303          Elbow/Forearm (Therapeutic Exercise)    Elbow/Forearm (Therapeutic Exercise) strengthening exercise  -     Elbow/Forearm Strengthening (Therapeutic Exercise) bilateral;flexion;extension;sitting;10 repetitions  pt. tolerated mild manual resistance  -     Row Name 12/07/22 1303          Motor Skills    Therapeutic Exercise shoulder;elbow/forearm  -     Row Name 12/07/22 1303          Balance    Balance Assessment sitting static balance;sitting dynamic balance  -CHRISTI     Static Sitting Balance standby assist  -     Dynamic Sitting Balance contact guard  -CHRISTI     Position, Sitting Balance unsupported;sitting edge of bed  -CHRISTI     Comment, Balance rocking A/P 5 reps  -CHRISTI           User Key  (r) = Recorded By, (t) = Taken By, (c) = Cosigned By    Initials Name Provider Type    Steffi Ennis, OT Occupational Therapist               Goals/Plan     Row Name 12/07/22 1309          Bed Mobility Goal 1 (OT)    Activity/Assistive Device (Bed Mobility Goal 1, OT) rolling to left;rolling to right;supine to sit  -CHRISTI     [place] Level/Cues Needed (Bed Mobility Goal 1, OT) moderate assist (50-74% patient effort);tactile cues required;verbal cues required  -CHRISTI     Time Frame (Bed Mobility Goal 1, OT) long term goal (LTG);10 days  -CHRISTI     Progress/Outcomes (Bed Mobility Goal 1, OT) new goal  -     Row Name 12/07/22 1309          Transfer Goal 1 (OT)    Activity/Assistive Device (Transfer Goal 1, OT) bed-to-chair/chair-to-bed;toilet;commode, bedside with drop arms;commode, bedside without drop  arms  -CHRISTI     [place] Level/Cues Needed (Transfer Goal 1, OT) maximum assist (25-49% patient effort)  2 assist  -CHRISTI     Time Frame (Transfer Goal 1, OT) long term goal (LTG);10 days  -CHRISTI     Strategies/Barriers (Transfers Goal 1, OT) sit pivot maintaining WB precaution  -CHRISTI     Progress/Outcome (Transfer Goal 1, OT) new goal  -CHRISTI     Row Name 12/07/22 1309          Grooming Goal 1 (OT)    Activity/Device (Grooming Goal 1, OT) oral care;wash face, hands  -CHRISTI     [place] (Grooming Goal 1, OT) set-up required;standby assist  -CHRISTI     Time Frame (Grooming Goal 1, OT) long term goal (LTG);10 days  -CHRISTI     Strategies/Barriers (Grooming Goal 1, OT) unsupported sitting  -CHRISTI     Progress/Outcome (Grooming Goal 1, OT) new goal  -CHRISTI     Row Name 12/07/22 1309          Strength Goal 1 (OT)    Strength Goal 1 (OT) Pt. will complete 10 reps each UE TE AROM/yellow tband to support BADL independence.  -CHRISTI     Time Frame (Strength Goal 1, OT) long term goal (LTG);10 days  -CHRISTI     Progress/Outcome (Strength Goal 1, OT) new goal  -CHRISTI     Row Name 12/07/22 1309          Therapy Assessment/Plan (OT)    Planned Therapy Interventions (OT) activity tolerance training;BADL retraining;cognitive/visual perception retraining;neuromuscular control/coordination retraining;occupation/activity based interventions;patient/caregiver education/training;ROM/therapeutic exercise;strengthening exercise;transfer/mobility retraining;functional balance retraining  -CHRISTI           User Key  (r) = Recorded By, (t) = Taken By, (c) = Cosigned By    Initials Name Provider Type    Steffi Ennis, OT Occupational Therapist               Clinical Impression     Row Name 12/07/22 1306          Pain Assessment    Pretreatment Pain Rating 0/10 - no pain  -CHRISTI     Posttreatment Pain Rating 0/10 - no pain  -CHRISTI     Row Name 12/07/22 1306          Plan of Care Review    Plan of Care Reviewed With patient;sibling  -CHRISTI     Progress no change  -CHRISTI     Outcome  Evaluation OT evaluation completed with pt. demonstrating limited alertness and with impaired strength, vision balance, endurance, coordination and cognition impacting ADL independence and related transfers.  Pt. grossly max of 2 with bed mobility and parital stand, lift to recliner.  Pt. mod to dependence with BADL task completion.  Skilled OT services appropriate to address deficit areas and recommend return to rehab at discharge.  -     Row Name 12/07/22 1306          Therapy Assessment/Plan (OT)    Patient/Family Therapy Goal Statement (OT) return to PLOF  -     Rehab Potential (OT) fair, will monitor progress closely  -CHRISTI     Criteria for Skilled Therapeutic Interventions Met (OT) yes;meets criteria;skilled treatment is necessary  -     Therapy Frequency (OT) daily  -CHRISTI     Row Name 12/07/22 1306          Therapy Plan Review/Discharge Plan (OT)    Equipment Needs Upon Discharge (OT) --  TBD post rehab  -CHRISTI     Anticipated Discharge Disposition (OT) skilled nursing facility  -     Row Name 12/07/22 1306          Vital Signs    Pre Systolic BP Rehab --  nurse cleared for evaluation  -CHRISTI     O2 Delivery Pre Treatment nasal cannula  -CHRISTI     O2 Delivery Intra Treatment nasal cannula  -CHRISTI     O2 Delivery Post Treatment nasal cannula  -CHRISTI     Pre Patient Position Supine  -CHRISTI     Intra Patient Position Sitting  -CHRISTI     Post Patient Position Sitting  -CHRISTI     Row Name 12/07/22 1306          Positioning and Restraints    Pre-Treatment Position in bed  -CHRISTI     Post Treatment Position chair  -CHRISTI     In Chair reclined;call light within reach;encouraged to call for assist;exit alarm on;with family/caregiver;legs elevated;LUE elevated;RUE elevated  -           User Key  (r) = Recorded By, (t) = Taken By, (c) = Cosigned By    Initials Name Provider Type    Steffi Ennis, OT Occupational Therapist               Outcome Measures     Row Name 12/07/22 1312          How much help from another is currently needed...     Putting on and taking off regular lower body clothing? 1  -CHRISTI     Bathing (including washing, rinsing, and drying) 1  -CHRISTI     Toileting (which includes using toilet bed pan or urinal) 1  -CHRISTI     Putting on and taking off regular upper body clothing 2  -CHRISTI     Taking care of personal grooming (such as brushing teeth) 2  -CHRISTI     Eating meals 2  -CHRISTI     AM-PAC 6 Clicks Score (OT) 9  -CHRISTI     Row Name 12/07/22 1312          Functional Assessment    Outcome Measure Options AM-PAC 6 Clicks Daily Activity (OT)  -CHRISTI           User Key  (r) = Recorded By, (t) = Taken By, (c) = Cosigned By    Initials Name Provider Type    Steffi Ennis, OT Occupational Therapist                Occupational Therapy Education     Title: PT OT SLP Therapies (In Progress)     Topic: Occupational Therapy (Done)     Point: ADL training (Done)     Description:   Instruct learner(s) on proper safety adaptation and remediation techniques during self care or transfers.   Instruct in proper use of assistive devices.              Learning Progress Summary           Patient Acceptance, E, VU,NR by CHRISTI at 12/7/2022 1312    Comment: reason for consult, precautions LLE, bed mobility, transfers and ADL sequencing and safety, UE TE   Family Acceptance, E, VU,NR by CHRISTI at 12/7/2022 1312    Comment: reason for consult, precautions LLE, bed mobility, transfers and ADL sequencing and safety, UE TE                   Point: Home exercise program (Done)     Description:   Instruct learner(s) on appropriate technique for monitoring, assisting and/or progressing therapeutic exercises/activities.              Learning Progress Summary           Patient Acceptance, E, VU,NR by CHRISTI at 12/7/2022 1312    Comment: reason for consult, precautions LLE, bed mobility, transfers and ADL sequencing and safety, UE TE   Family Acceptance, E, VU,NR by CHRISTI at 12/7/2022 1312    Comment: reason for consult, precautions LLE, bed mobility, transfers and ADL sequencing and safety, UE  TE                   Point: Precautions (Done)     Description:   Instruct learner(s) on prescribed precautions during self-care and functional transfers.              Learning Progress Summary           Patient Acceptance, E, VU,NR by CHRISTI at 12/7/2022 1312    Comment: reason for consult, precautions LLE, bed mobility, transfers and ADL sequencing and safety, UE TE   Family Acceptance, E, VU,NR by CHRISTI at 12/7/2022 1312    Comment: reason for consult, precautions LLE, bed mobility, transfers and ADL sequencing and safety, UE TE                   Point: Body mechanics (Done)     Description:   Instruct learner(s) on proper positioning and spine alignment during self-care, functional mobility activities and/or exercises.              Learning Progress Summary           Patient Acceptance, E, VU,NR by CHRISTI at 12/7/2022 1312    Comment: reason for consult, precautions LLE, bed mobility, transfers and ADL sequencing and safety, UE TE   Family Acceptance, E, VU,NR by CHRISTI at 12/7/2022 1312    Comment: reason for consult, precautions LLE, bed mobility, transfers and ADL sequencing and safety, UE TE                               User Key     Initials Effective Dates Name Provider Type Discipline    CHRISTI 06/16/21 -  Steffi Carpenter, OT Occupational Therapist OT              OT Recommendation and Plan  Planned Therapy Interventions (OT): activity tolerance training, BADL retraining, cognitive/visual perception retraining, neuromuscular control/coordination retraining, occupation/activity based interventions, patient/caregiver education/training, ROM/therapeutic exercise, strengthening exercise, transfer/mobility retraining, functional balance retraining  Therapy Frequency (OT): daily  Plan of Care Review  Plan of Care Reviewed With: patient, sibling  Progress: no change  Outcome Evaluation: OT evaluation completed with pt. demonstrating limited alertness and with impaired strength, vision balance, endurance, coordination and cognition  impacting ADL independence and related transfers.  Pt. grossly max of 2 with bed mobility and parital stand, lift to recliner.  Pt. mod to dependence with BADL task completion.  Skilled OT services appropriate to address deficit areas and recommend return to rehab at discharge.     Time Calculation:    Time Calculation- OT     Row Name 12/07/22 1314             Time Calculation- OT    OT Start Time 1123  -CHRISTI      OT Received On 12/07/22  -CHRISTI      OT Goal Re-Cert Due Date 12/17/22  -CHRISTI         Timed Charges    35918 - OT Therapeutic Exercise Minutes 4  -CHRISTI      89513 - OT Therapeutic Activity Minutes 4  -CHRISTI      25979 - OT Self Care/Mgmt Minutes 4  -CHRISTI         Untimed Charges    OT Eval/Re-eval Minutes 40  -CHRISTI         Total Minutes    Timed Charges Total Minutes 12  -CHRISTI      Untimed Charges Total Minutes 40  -CHRISTI       Total Minutes 52  -CHRISTI            User Key  (r) = Recorded By, (t) = Taken By, (c) = Cosigned By    Initials Name Provider Type    Steffi Ennis OT Occupational Therapist              Therapy Charges for Today     Code Description Service Date Service Provider Modifiers Qty    [id number]  OT THER PROC EA 15 MIN 12/7/2022 Steffi Carpenter OT GO 1    [id number] HC OT EVAL MOD COMPLEXITY 3 12/7/2022 Steffi Carpenter OT GO 1               Steffi Carpenter OT  12/7/2022

## 2022-12-07 NOTE — PROGRESS NOTES
Pharmacy Consult-Vancomycin Dosing  Omkar Nava is a  67 y.o. male receiving vancomycin therapy.     Indication: osteomyelitis  Consulting Provider: Dr Gordon  ID Consult: yes  Goal AUC: 400 - 600 mg/L*hr    Current Antimicrobial Therapy  Anti-Infectives (From admission, onward)      Ordered     Dose/Rate Route Frequency Start Stop    12/06/22 1108  vancomycin (VANCOCIN) 1000 mg/200 mL dextrose 5% IVPB        Ordering Provider: Ijeoma Kuhn, Thom    1,000 mg  over 60 Minutes Intravenous Every 12 Hours Scheduled 12/06/22 2200 01/17/23 2159    12/06/22 1020  cefepime (MAXIPIME) 1 g/100 mL 0.9% NS IVPB ([facility])        Ordering Provider: Ijeoma Kuhn PharmD    1 g  over 4 Hours Intravenous Every 8 Hours 12/06/22 1800 01/17/23 1759    12/06/22 1020  cefepime (MAXIPIME) 2 g/100 mL 0.9% NS (mbp)        Ordering Provider: Ijeoma Kuhn PharmD    2 g  200 mL/hr over 30 Minutes Intravenous Once 12/06/22 1115 12/06/22 1307    12/06/22 1007  vancomycin 1750 mg/500 mL 0.9% NS IVPB ([facility])        Ordering Provider: Ijeoma Kuhn PharmD    20 mg/kg × 81.6 kg  over 120 Minutes Intravenous Once 12/06/22 1100 12/06/22 1256    12/06/22 1022  Pharmacy to dose vancomycin        Ordering Provider: Rayray Gordon MD     Does not apply Continuous PRN 12/06/22 1021 01/17/23 1020    12/05/22 2055  ceFAZolin in dextrose (ANCEF) IVPB solution 2 g        Ordering Provider: Yeison Petty MD    2 g  over 30 Minutes Intravenous Every 8 Hours 12/06/22 0000 12/06/22 0744    12/05/22 1058  vancomycin 1250 mg/250 mL 0.9% NS IVPB ([facility])        Ordering Provider: Yeison Petty MD    15 mg/kg × 89.9 kg Intravenous Once 12/05/22 1100 12/05/22 1651          Allergies  Allergies as of 11/30/2022    (No Known Allergies)     Labs  Results from last 7 days   Lab Units 12/07/22  0811 12/06/22  0701 12/05/22  1138   BUN mg/dL 15 20 22   CREATININE mg/dL 0.95 1.03 1.03     Results from last 7 days   Lab Units 12/07/22  0811 12/06/22  0508  "12/05/22  1138   WBC 10*3/mm3 8.12 11.88* 8.63     Evaluation of Dosing   Last Dose Received in the ED/Outside Facility: vancomycin 1250 mg IV x1 on 12/5 preop  Is Patient on Dialysis or Renal Replacement: no    Ht - 182.9 cm (72\")  Wt - 81.6 kg (180 lb)    Estimated Creatinine Clearance: 89.5 mL/min (by C-G formula based on SCr of 0.95 mg/dL).    Intake & Output (last 3 days)         12/04 0701 12/05 0700 12/05 0701 12/06 0700 12/06 0701 12/07 0700 12/07 0701 12/08 0700    P.O.   600     I.V. (mL/kg)  2000 (24.5)      Total Intake(mL/kg)  2000 (24.5) 600 (7.4)     Urine (mL/kg/hr)   350 (0.2)     Total Output   350     Net  +2000 +250             Urine Unmeasured Occurrence  3 x            Microbiology and Radiology  Microbiology Results (last 10 days)       Procedure Component Value - Date/Time    Tissue / Bone Culture - Tissue, Toe, Left [[id number]] Collected: 12/05/22 1837    Lab Status: Preliminary result Specimen: Tissue from Toe, Left Updated: 12/06/22 0911     Tissue Culture Growth present, too young to evaluate     Gram Stain Moderate (3+) WBCs seen      No organisms seen    AFB Culture - Tissue, Toe, Left [[id number]] Collected: 12/05/22 1837    Lab Status: Preliminary result Specimen: Tissue from Toe, Left Updated: 12/06/22 1328     AFB Stain No acid fast bacilli seen on concentrated smear          Reported Vancomycin Levels  Results from last 7 days   Lab Units 12/07/22  0811   VANCOMYCIN RM mcg/mL 19.70      [facility] AUC Calculation:    Current AUC:  528 mg/L*hr    Predicted Steady State AUC on Current Dose: 583 mg/L*hr  _________________________________    Predicted Steady State AUC on New Dose:    mg/L*hr    Assessment/Plan:    1. Pharmacy to dose vancomycin for osteomyelitis. Goal  to 600 mg/L*hr  2. Received a loading dose of vancomycin 1750 mg x1 on 12/6 at 1056.  3. Currently on maintenance dose of vancomycin 1000 mg IV q12h (~ 12.3 mg/kg), predicted AUCss 583 mg/L*hr  Contacted Rehab " facility, Yuliet RN, reported that he had remained on vancomycin 1 gm iv q12 through 11/25.  His vancomycin prior to dose on 11/22 was 12 per Yuliet.  4. Vancomycin level was 19.7 today (drawn at 0811 ~ 10 hrs post dose),  SCr stable (0.95 today), WBC 8.12.      Will continue current regimen and recheck level on 12/9 to evaluate clearance and dosing needs      Per Dr Gordon, planning 6 weeks of IV antibiotics  5. Monitor renal function, cultures and sensitivities, and clinical status, and adjust regimen as necessary.  Pharmacy will continue to follow.    Ijeoma Kuhn, PharmD  12/7/2022  09:00 EST

## 2022-12-07 NOTE — THERAPY EVALUATION
Patient Name: Omkar Nava  : 1955    MRN: [id number]                              Today's Date: 2022       Admit Date: 2022    Visit Dx:     ICD-10-CM ICD-9-CM   1. 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, without long-term current use of insulin   • Orthostatic hypotension   • Acute osteomyelitis of left foot (HCC)   • Type 2 diabetes mellitus (HCC)   • Essential hypertension   • Psychotic disorder (HCC)   • Neurocognitive disorder     Past Medical History:   Diagnosis Date   • Anemia    • Dementia (HCC)    • Diabetes mellitus (HCC)    • Dysphagia    • GERD (gastroesophageal reflux disease)    • History of alcohol abuse    • History of cocaine use    • History of marijuana use    • Hypertension    • Osteomyelitis (HCC)    • Poor historian     records obtained from nursing home records & his family   • Visual impairment      Past Surgical History:   Procedure Laterality Date   • AMPUTATION FOOT Left 10/18/2022    Procedure: PARTIAL FIRST RAY AMPUTATION LEFT;  Surgeon: Yeison Petty MD;  Location:  [facility];  Service: Orthopedics;  Laterality: Left;   • AMPUTATION FOOT Left 2022    Procedure: Transmetatarsal of Left Foot;  Surgeon: Yeison Petty MD;  Location:  [facility];  Service: Orthopedics;  Laterality: Left;   • EYE SURGERY        General Information     Row Name 22 1125          Physical Therapy Time and Intention    Document Type evaluation  -DM     Mode of Treatment co-treatment;physical therapy  -DM     Row Name 22 1125          General Information    Patient Profile Reviewed yes  -DM     Prior Level of Function --  pt poor historian(drowsy,baseline Dem;per chart & sister,pt was living w/ parents&amb indep [facility](Had SPC & R wx) but needed  assist w/ ADL's; has been at [facility] since 22(perPT note :amb3 ft w/ R wx & L heel WB w/toe offload shoe)  -DM     Existing Precautions/Restrictions fall;non-weight bearing;left;other (see comments);oxygen therapy device and L/min  : TM amp L foot d/t OM (Prev part.amp L hallux 10-18-22);MRSA;NWB L;Dem.;post op conf/drowsy(Seroquel this AM; was agit/combat. & has haldol & ativan ordered PRN);Tylenol q4,& also has roxycodone PRN;ext cath;brief for mobil, per sister  -DM     Barriers to Rehab medically complex;previous functional deficit;cognitive status;visual deficit  per 22 PT note,pt is leg.blind  -DM     Row Name 22 112          Living Environment    People in Home facility resident  [facility]  -DM     Row Name 22 112          Cognition    Orientation Status (Cognition) oriented to;person;disoriented to;place;time  states name/ & mo,but not yr  -DM     Row Name 22 112          Safety Issues, Functional Mobility    Safety Issues Affecting Function (Mobility) ability to follow commands;awareness of need for assistance;insight into deficits/self-awareness;judgment;safety precaution awareness;safety precautions follow-through/compliance;sequencing abilities  -DM     Impairments Affecting Function (Mobility) balance;cognition;endurance/activity tolerance;pain;postural/trunk control;shortness of breath;strength;visual/perceptual  -DM     Cognitive Impairments, Mobility Safety/Performance attention;awareness, need for assistance;insight into deficits/self-awareness;judgment;problem-solving/reasoning;safety precaution awareness;safety precaution follow-through;sequencing abilities  -DM           User Key  (r) = Recorded By, (t) = Taken By, (c) = Cosigned By    Initials Name Provider Type    DM Demetra Matta, PT Physical Therapist               Mobility     Row Name 22 112          Bed Mobility    Bed Mobility rolling left;rolling  right;scooting/bridging;supine-sit  -DM     Rolling Left [place] (Bed Mobility) verbal cues;nonverbal cues (demo/gesture);maximum assist (25% patient effort);2 person assist  rolled to place Depends  -DM     Rolling Right [place] (Bed Mobility) verbal cues;nonverbal cues (demo/gesture);maximum assist (25% patient effort);2 person assist  Depends reposn & cinched  -DM     Scooting/Bridging [place] (Bed Mobility) verbal cues;maximum assist (25% patient effort);2 person assist  bed placed in trendelenburg, & pt.scooted to HOB x 2 w/ draw sheet(pt half bridging w/ RLE to assist)  -DM     Supine-Sit [place] (Bed Mobility) verbal cues;maximum assist (25% patient effort);2 person assist  to EOB w/ max 2A(pt pushing on L bedrail w/ R Hand,& propping up w/ LUE)  -DM     Assistive Device (Bed Mobility) bed rails;draw sheet;head of bed elevated  -DM     Comment, (Bed Mobility) issued lift sling,daija cush,ch.al, tdsocks,loaner std wx, mask;per nsg had seroquel 0841, but no pain med (tylenol & roxycodone avail.PRN);drowsy/leth.(baselineDem.),but opens eyes to cues & O X 1 + mo.(sister present & gave PMH;at SNF for STR prior; sister stated pt owned SPC,but unaware of R wx,however this was noted in CM charting); nsg states pt calm (yest was agit/combat.,& PT eval def. but coop today);once Depends placed, Skin care nsg in to assess (Depends opened while pt sitting EOB & during STS trial)  -DM     Row Name 12/07/22 1125          Transfers    Comment, (Transfers) cues for HP, seq for STS w/ std wx & max 2A(cued for NWB LLE);pt resting L foot lightly on PT's forefoot while sitting, but unable to maintain NWB upon standing, & sat back on bed  -DM     Row Name 12/07/22 1125          Bed-Chair Transfer    Bed-Chair [place] (Transfers) verbal cues;dependent (less than 25% patient effort);2 person assist  -DM     Assistive Device (Bed-Chair Transfers) lift device  -DM     Comment, (Bed-Chair Transfer) s/p STS trial  & skin care nsg checking perineal/sacral area, sat back EOB & Sit sling placed, then lifted to chair w/ PT supp B LE (Dep. A) & OT supp.head/shldrs.  -DM     Row Name 12/07/22 1125          Sit-Stand Transfer    Sit-Stand [place] (Transfers) verbal cues;maximum assist (25% patient effort);2 person assist  -DM     Assistive Device (Sit-Stand Transfers) walker, standard  -DM     Comment, (Sit-Stand Transfer) Able to clear buttocks from bed by approx 2 inches while skin care nsg asssessed,but began placing mod to max wt.through LLE (resting on PT's forefoot), & sat back EOB (pantera approx 30 sec.)  -DM     Row Name 12/07/22 1125          Gait/Stairs (Locomotion)    [place] Level (Gait) unable to assess  -DM     Row Name 12/07/22 1125          Mobility    Extremity Weight-bearing Status left lower extremity  -DM     Left Lower Extremity (Weight-bearing Status) non weight-bearing (NWB)  -DM           User Key  (r) = Recorded By, (t) = Taken By, (c) = Cosigned By    Initials Name Provider Type    DM Demetra Matta, PT Physical Therapist               Obj/Interventions     Row Name 12/07/22 1125          Range of Motion Comprehensive    General Range of Motion lower extremity range of motion deficits identified  -DM     Comment, General Range of Motion L ankle NA d/t TM amp of L foot; L Hip & knee stout. 25-50% by L foot discomf; RLE WFL  -DM     Row Name 12/07/22 1125          Strength Comprehensive (MMT)    General Manual Muscle Testing (MMT) Assessment lower extremity strength deficits identified  -DM     Comment, General Manual Muscle Testing (MMT) Assessment LLE NA d/t TM amp of foot; RLE grossly 3+ to 4/5  -DM     Row Name 12/07/22 1125          Motor Skills    Therapeutic Exercise hip;knee;ankle  -DM     Row Name 12/07/22 1125          Hip (Therapeutic Exercise)    Hip (Therapeutic Exercise) AAROM (active assistive range of motion)  -DM     Hip AAROM (Therapeutic Exercise)  bilateral;flexion;extension;aBduction;aDduction;external rotation;internal rotation;supine;10 repetitions;sitting  max A w/ L hip, mod A w/R hip;did half bridging RLE x 1 to assist scooting to HOB;Hip abd/add done UIC (PT supp.under calf);drowsy/dozing off (F)  -DM     Row Name 12/07/22 1125          Knee (Therapeutic Exercise)    Knee (Therapeutic Exercise) AAROM (active assistive range of motion)  -DM     Knee AAROM (Therapeutic Exercise) bilateral;flexion;extension;supine;10 repetitions;sitting  max A for L knee F/E (H.slides, SAQ) while PT supp.under L calf); mod A for RLE;also did R BKFO X 10;had pt count reps w/ PT to focus on task  -DM     Row Name 12/07/22 1125          Ankle (Therapeutic Exercise)    Ankle (Therapeutic Exercise) AAROM (active assistive range of motion);PROM (passive range of motion)  -DM     Ankle AAROM (Therapeutic Exercise) right;dorsiflexion;plantarflexion;supine;10 repetitions  -DM     Ankle PROM (Therapeutic Exercise) right;supine;10 repetitions;other (see comments)  AC  -DM     Row Name 12/07/22 1125          Balance    Balance Assessment sitting static balance;sitting dynamic balance;standing static balance;standing dynamic balance  -DM     Static Sitting Balance verbal cues;standby assist  -DM     Dynamic Sitting Balance verbal cues;contact guard  -DM     Position, Sitting Balance unsupported;sitting edge of bed  -DM     Static Standing Balance verbal cues;maximum assist;2-person assist  -DM     Dynamic Standing Balance verbal cues;maximum assist;2-person assist  WS to offload LLE & ext trunk/shift wt over C of G;Unable to maintain NWB L  -DM     Position/Device Used, Standing Balance supported;walker, standard  -DM     Balance Interventions sitting;standing;static;dynamic;weight shifting activity  -DM           User Key  (r) = Recorded By, (t) = Taken By, (c) = Cosigned By    Initials Name Provider Type    Demetra Chacon, PT Physical Therapist               Goals/Plan     Row  Name 12/07/22 1125          Bed Mobility Goal 1 (PT)    Activity/Assistive Device (Bed Mobility Goal 1, PT) bed mobility activities, all  -DM     Violet Level/Cues Needed (Bed Mobility Goal 1, PT) maximum assist (25-49% patient effort)  -DM     Time Frame (Bed Mobility Goal 1, PT) long term goal (LTG);10 days  -DM     Row Name 12/07/22 1125          Transfer Goal 1 (PT)    Activity/Assistive Device (Transfer Goal 1, PT) sit-to-stand/stand-to-sit;bed-to-chair/chair-to-bed;walker, standard  -DM     Violet Level/Cues Needed (Transfer Goal 1, PT) maximum assist (25-49% patient effort)  -DM     Time Frame (Transfer Goal 1, PT) long term goal (LTG);10 days  -DM     Row Name 12/07/22 1125          Gait Training Goal 1 (PT)    Activity/Assistive Device (Gait Training Goal 1, PT) gait (walking locomotion);walker, standard  NWB LLE  -DM     Violet Level (Gait Training Goal 1, PT) maximum assist (25-49% patient effort)  -DM     Distance (Gait Training Goal 1, PT) 3  -DM     Time Frame (Gait Training Goal 1, PT) long term goal (LTG);10 days  -DM     Row Name 12/07/22 1125          Patient Education Goal (PT)    Activity (Patient Education Goal, PT) HEP exer  -DM     Violet/Cues/Accuracy (Memory Goal 2, PT) demonstrates adequately  w/ supv of staff/family  -DM     Time Frame (Patient Education Goal, PT) long term goal (LTG);10 days  -DM     Row Name 12/07/22 1125          Therapy Assessment/Plan (PT)    Planned Therapy Interventions (PT) balance training;bed mobility training;gait training;home exercise program;patient/family education;strengthening;transfer training  -DM           User Key  (r) = Recorded By, (t) = Taken By, (c) = Cosigned By    Initials Name Provider Type    DM Demetra Matta, PT Physical Therapist               Clinical Impression     Row Name 12/07/22 1125          Pain    Pretreatment Pain Rating 0/10 - no pain  -DM     Posttreatment Pain Rating 0/10 - no pain  -DM     Additional  Documentation Pain Scale: Numbers Pre/Post-Treatment (Group)  -DM     Row Name 12/07/22 1125          Plan of Care Review    Plan of Care Reviewed With patient;sibling  -DM     Progress improving  -DM     Outcome Evaluation PT Eval completed. Seen POD 2 s/p TM amp L Foot d/t OM. Presents w/ baseline Dem, drowsy/conf.(seroquel this AM;had post-op agitation/combative 12-6), MRSA, leg. blind, decr strength/endurance & impaired funct mobil. Rolled L/R w/ drawsheet & max 2A to place Depends, scooted to HOB w/ drawsheet & max 2A (in Trendel., w/ pt half-bridging RLE to assist), transf to sitting EOB w/ max 2A, STS trial w/ Std wx. & max 2A (began placing mod to max wt through LLE,& sat back EOB), sling placed & transf to chair w/ mech lift, then completed remaining LE exer & WS trunk L/R to achieve midline. Noted HR 63,low HGB 7.7 (from 8.2), & signif.fatigue. Recommend IRF at d/c (per CM note, plans return to SNF).  -DM     Row Name 12/07/22 1125          Therapy Assessment/Plan (PT)    Patient/Family Therapy Goals Statement (PT) improved funct mobil  -DM     Rehab Potential (PT) good, to achieve stated therapy goals  -DM     Criteria for Skilled Interventions Met (PT) yes;meets criteria;skilled treatment is necessary  -DM     Therapy Frequency (PT) daily  -DM     Row Name 12/07/22 1125          Vital Signs    Pre Systolic BP Rehab 161  -DM     Pre Treatment Diastolic BP 80  -DM     Pretreatment Heart Rate (beats/min) 63  -DM     Pre SpO2 (%) 98  -DM     O2 Delivery Pre Treatment nasal cannula  2 L  -DM     O2 Delivery Intra Treatment nasal cannula  -DM     O2 Delivery Post Treatment nasal cannula  -DM     Pre Patient Position Supine  -DM     Intra Patient Position Sitting  -DM     Post Patient Position Sitting  -DM     Rest Breaks  1  -DM     Row Name 12/07/22 1125          Positioning and Restraints    Pre-Treatment Position in bed  -DM     Post Treatment Position chair  -DM     In Chair notified nsg;reclined;call  light within reach;encouraged to call for assist;exit alarm on;with family/caregiver;RUE elevated;LUE elevated;waffle cushion;on mechanical lift sling;legs elevated  Depends opened & Skin care nurse assessed perineal area  -DM           User Key  (r) = Recorded By, (t) = Taken By, (c) = Cosigned By    Initials Name Provider Type    Demetra Chacon, PT Physical Therapist               Outcome Measures     Row Name 12/07/22 1125          How much help from another person do you currently need...    Turning from your back to your side while in flat bed without using bedrails? 2  -DM     Moving from lying on back to sitting on the side of a flat bed without bedrails? 2  -DM     Moving to and from a bed to a chair (including a wheelchair)? 1  -DM     Standing up from a chair using your arms (e.g., wheelchair, bedside chair)? 1  -DM     Climbing 3-5 steps with a railing? 1  -DM     To walk in hospital room? 1  -DM     AM-PAC 6 Clicks Score (PT) 8  -DM     Highest level of mobility 3 --> Sat at edge of bed  -DM     Row Name 12/07/22 1312 12/07/22 1125       Functional Assessment    Outcome Measure Options AM-PAC 6 Clicks Daily Activity (OT)  -CHRISTI AM-PAC 6 Clicks Basic Mobility (PT)  -DM          User Key  (r) = Recorded By, (t) = Taken By, (c) = Cosigned By    Initials Name Provider Type    Steffi Ennis, OT Occupational Therapist    Demetra Chacon, PT Physical Therapist                             Physical Therapy Education     Title: PT OT SLP Therapies (In Progress)     Topic: Physical Therapy (In Progress)     Point: Mobility training (In Progress)     Learning Progress Summary           Patient Acceptance, E,D, NR by DM at 12/7/2022 1414   Family Acceptance, E,D, NR by DM at 12/7/2022 1414                   Point: Home exercise program (In Progress)     Learning Progress Summary           Patient Acceptance, E,D, NR by DM at 12/7/2022 1414   Family Acceptance, E,D, NR by DM at 12/7/2022 1414                    Point: Body mechanics (In Progress)     Learning Progress Summary           Patient Acceptance, E,D, NR by DM at 12/7/2022 1414   Family Acceptance, E,D, NR by DM at 12/7/2022 1414                   Point: Precautions (In Progress)     Learning Progress Summary           Patient Acceptance, E,D, NR by DM at 12/7/2022 1414   Family Acceptance, E,D, NR by DM at 12/7/2022 1414                               User Key     Initials Effective Dates Name Provider Type Discipline     06/16/21 -  Demetra Matta, PT Physical Therapist PT              PT Recommendation and Plan  Planned Therapy Interventions (PT): balance training, bed mobility training, gait training, home exercise program, patient/family education, strengthening, transfer training  Plan of Care Reviewed With: patient, sibling  Progress: improving  Outcome Evaluation: PT Eval completed. Seen POD 2 s/p TM amp L Foot d/t OM. Presents w/ baseline Dem, drowsy/conf.(seroquel this AM;had post-op agitation/combative 12-6), MRSA, leg. blind, decr strength/endurance & impaired funct mobil. Rolled L/R w/ drawsheet & max 2A to place Depends, scooted to HOB w/ drawsheet & max 2A (in Trendel., w/ pt half-bridging RLE to assist), transf to sitting EOB w/ max 2A, STS trial w/ Std wx. & max 2A (began placing mod to max wt through LLE,& sat back EOB), sling placed & transf to chair w/ mech lift, then completed remaining LE exer & WS trunk L/R to achieve midline. Noted HR 63,low HGB 7.7 (from 8.2), & signif.fatigue. Recommend IRF at d/c (per CM note, plans return to SNF).     Time Calculation:    PT Charges     Row Name 12/07/22 1415             Time Calculation    Start Time 1125  -DM      PT Received On 12/07/22  -DM      PT Goal Re-Cert Due Date 12/17/22  -DM         Time Calculation- PT    Total Timed Code Minutes- PT 99 minute(s)  -DM         Timed Charges    08371 - PT Therapeutic Exercise Minutes 15  -DM      31548 - PT Therapeutic Activity Minutes 24  -DM          Untimed Charges    PT Eval/Re-eval Minutes 60  -DM         Total Minutes    Timed Charges Total Minutes 39  -DM      Untimed Charges Total Minutes 60  -DM       Total Minutes 99  -DM            User Key  (r) = Recorded By, (t) = Taken By, (c) = Cosigned By    Initials Name Provider Type    Demetra Chacon, PT Physical Therapist              Therapy Charges for Today     Code Description Service Date Service Provider Modifiers Qty    72132772861 HC PT THER PROC EA 15 MIN 12/7/2022 Demetra Matta, PT GP 1    15887538741 HC PT THERAPEUTIC ACT EA 15 MIN 12/7/2022 Demetra Matta, PT GP 2    68627783957 HC PT EVAL MOD COMPLEXITY 4 12/7/2022 Demetra Matta, PT GP 1          PT G-Codes  Outcome Measure Options: AM-PAC 6 Clicks Daily Activity (OT)  AM-PAC 6 Clicks Score (PT): 8  AM-PAC 6 Clicks Score (OT): 9  PT Discharge Summary  Anticipated Discharge Disposition (PT): inpatient rehabilitation facility    Demetra Matta, PT  12/7/2022

## 2022-12-07 NOTE — PLAN OF CARE
Goal Outcome Evaluation:  Plan of Care Reviewed With: patient, sibling        Progress: improving  Outcome Evaluation: PT Eval completed. Seen POD 2 s/p TM amp L Foot d/t OM. Presents w/ baseline Dem, drowsy/conf.(seroquel this AM;had post-op agitation/combative 12-6), MRSA, leg. blind, decr strength/endurance & impaired funct mobil. Rolled L/R w/ drawsheet & max 2A to place Depends, scooted to HOB w/ drawsheet & max 2A (in Trendel., w/ pt half-bridging RLE to assist), transf to sitting EOB w/ max 2A, STS trial w/ Std wx. & max 2A (began placing mod to max wt through LLE,& sat back EOB), sling placed & transf to chair w/ mech lift, then completed remaining LE exer & WS trunk L/R to achieve midline. Noted HR 63,low HGB 7.7 (from 8.2), & signif.fatigue. Recommend IRF at d/c (per CM note, plans return to SNF).

## 2022-12-07 NOTE — CASE MANAGEMENT/SOCIAL WORK
Continued Stay Note  [facility]     Patient Name: Omkar Nava  MRN: [id number]  Today's Date: 12/7/2022    Admit Date: 12/5/2022    Plan: SNF   Discharge Plan     Row Name 12/07/22 1504       Plan    Plan SNF    Plan Comments I spoke with Shauna/Admissions at [facility], (#[phone])- Patient medical record for this admission faxed per request.  Shauna will review and let me know if they can accept  back to facility for rehab and IV antibiotics.  Teresa Ramon, Ext. [phone]    Final Discharge Disposition Code 03 - skilled nursing facility (SNF)               Discharge Codes    No documentation.               Expected Discharge Date and Time     Expected Discharge Date Expected Discharge Time    Dec 9, 2022             LUCINDA Stout

## 2022-12-08 LAB
CYTO UR: NORMAL
GLUCOSE BLDC GLUCOMTR-MCNC: 105 MG/DL (ref 70–130)
GLUCOSE BLDC GLUCOMTR-MCNC: 190 MG/DL (ref 70–130)
GLUCOSE BLDC GLUCOMTR-MCNC: 231 MG/DL (ref 70–130)
GLUCOSE BLDC GLUCOMTR-MCNC: 264 MG/DL (ref 70–130)
LAB AP CASE REPORT: NORMAL
LAB AP CLINICAL INFORMATION: NORMAL
PATH REPORT.FINAL DX SPEC: NORMAL
PATH REPORT.GROSS SPEC: NORMAL

## 2022-12-08 PROCEDURE — 63710000001 INSULIN DETEMIR PER 5 UNITS: Performed by: INTERNAL MEDICINE

## 2022-12-08 PROCEDURE — 99233 SBSQ HOSP IP/OBS HIGH 50: CPT | Performed by: FAMILY MEDICINE

## 2022-12-08 PROCEDURE — 97530 THERAPEUTIC ACTIVITIES: CPT

## 2022-12-08 PROCEDURE — 97110 THERAPEUTIC EXERCISES: CPT

## 2022-12-08 PROCEDURE — 82962 GLUCOSE BLOOD TEST: CPT

## 2022-12-08 PROCEDURE — 25010000002 CEFTRIAXONE PER 250 MG: Performed by: INTERNAL MEDICINE

## 2022-12-08 PROCEDURE — 25010000002 VANCOMYCIN PER 500 MG

## 2022-12-08 PROCEDURE — 63710000001 INSULIN LISPRO (HUMAN) PER 5 UNITS: Performed by: PHYSICIAN ASSISTANT

## 2022-12-08 PROCEDURE — 25010000002 CEFEPIME PER 500 MG: Performed by: INTERNAL MEDICINE

## 2022-12-08 RX ADMIN — APIXABAN 2.5 MG: 2.5 TABLET, FILM COATED ORAL at 21:39

## 2022-12-08 RX ADMIN — CEFEPIME 2 G: 2 INJECTION, POWDER, FOR SOLUTION INTRAVENOUS at 11:19

## 2022-12-08 RX ADMIN — OLANZAPINE 5 MG: 5 TABLET, FILM COATED ORAL at 09:47

## 2022-12-08 RX ADMIN — HYDRALAZINE HYDROCHLORIDE 25 MG: 25 TABLET, FILM COATED ORAL at 21:39

## 2022-12-08 RX ADMIN — OLANZAPINE 5 MG: 5 TABLET, FILM COATED ORAL at 21:39

## 2022-12-08 RX ADMIN — INSULIN LISPRO 4 UNITS: 100 INJECTION, SOLUTION INTRAVENOUS; SUBCUTANEOUS at 17:12

## 2022-12-08 RX ADMIN — CEFTRIAXONE 2 G: 2 INJECTION, POWDER, FOR SOLUTION INTRAMUSCULAR; INTRAVENOUS at 21:40

## 2022-12-08 RX ADMIN — LISINOPRIL 40 MG: 40 TABLET ORAL at 21:38

## 2022-12-08 RX ADMIN — APIXABAN 2.5 MG: 2.5 TABLET, FILM COATED ORAL at 09:47

## 2022-12-08 RX ADMIN — INSULIN DETEMIR 15 UNITS: 100 INJECTION, SOLUTION SUBCUTANEOUS at 21:38

## 2022-12-08 RX ADMIN — LORAZEPAM 2 MG: 1 TABLET ORAL at 18:01

## 2022-12-08 RX ADMIN — Medication 10 ML: at 09:47

## 2022-12-08 RX ADMIN — CEFEPIME 2 G: 2 INJECTION, POWDER, FOR SOLUTION INTRAVENOUS at 01:49

## 2022-12-08 RX ADMIN — QUETIAPINE FUMARATE 12.5 MG: 25 TABLET ORAL at 09:47

## 2022-12-08 RX ADMIN — VANCOMYCIN HYDROCHLORIDE 1000 MG: 1 INJECTION, SOLUTION INTRAVENOUS at 21:39

## 2022-12-08 RX ADMIN — Medication 10 ML: at 21:39

## 2022-12-08 RX ADMIN — DOCUSATE SODIUM 50 MG AND SENNOSIDES 8.6 MG 2 TABLET: 8.6; 5 TABLET, FILM COATED ORAL at 21:38

## 2022-12-08 RX ADMIN — VANCOMYCIN HYDROCHLORIDE 1000 MG: 1 INJECTION, SOLUTION INTRAVENOUS at 09:46

## 2022-12-08 RX ADMIN — INSULIN LISPRO 2 UNITS: 100 INJECTION, SOLUTION INTRAVENOUS; SUBCUTANEOUS at 12:17

## 2022-12-08 RX ADMIN — Medication 5 MG: at 21:39

## 2022-12-08 RX ADMIN — HYDRALAZINE HYDROCHLORIDE 25 MG: 25 TABLET, FILM COATED ORAL at 14:34

## 2022-12-08 RX ADMIN — AMLODIPINE BESYLATE 10 MG: 10 TABLET ORAL at 09:47

## 2022-12-08 NOTE — PROGRESS NOTES
[facility] Medicine Services  PROGRESS NOTE    Patient Name: Omkar Nava  : 1955  MRN: [id number]    Date of Admission: 2022  Primary Care Physician: Provider, No Known    Subjective   Subjective     CC:  F/U TMA L foot     HPI:  Patient seen and examined. Sleeping, no family/caregiver at bedside today. Did not awaken him.     ROS:  UTO    Objective   Objective     Vital Signs:   Temp:  [97.1 °F (36.2 °C)-98.4 °F (36.9 °C)] 98 °F (36.7 °C)  Heart Rate:  [61-71] 64  Resp:  [16-18] 16  BP: (130-151)/(63-93) 151/93  Flow (L/min):  [2] 2     Physical Exam:  Constitutional: No acute distress, sleeping   HENT: NCAT, mucous membranes moist  Respiratory: Clear to auscultation bilaterally, respiratory effort normal   Cardiovascular: RRR, no murmurs, rubs, or gallops  Gastrointestinal: Positive bowel sounds, soft, nontender, nondistended  Musculoskeletal: L LE with splint in place   Psychiatric: unable to evaluate   Neurologic: No focal deficits   Skin: No rashes      Results Reviewed:  LAB RESULTS:      Lab 22  0811 22  0503 22  1138   WBC 8.12 11.88* 8.63   HEMOGLOBIN 7.7* 8.2* 9.8*   HEMATOCRIT 23.9* 25.0* 30.4*   PLATELETS 248 287 333   NEUTROS ABS 5.21  --   --    IMMATURE GRANS (ABS) 0.02  --   --    LYMPHS ABS 1.95  --   --    MONOS ABS 0.67  --   --    EOS ABS 0.24  --   --    MCV 87.5 86.2 86.6   CRP 0.95*  --   --          Lab 22  0811 22  0701 22  1138   SODIUM 143 139 141   POTASSIUM 4.2 4.4 4.3   CHLORIDE 110* 103 105   CO2 28.0 26.0 28.0   ANION GAP 5.0 10.0 8.0   BUN 15 20 22   CREATININE 0.95 1.03 1.03   EGFR 88.8 80.6 80.6   GLUCOSE 112* 273* 167*   CALCIUM 8.4* 8.9 9.8         Lab 22  0811   TOTAL PROTEIN 5.6*   ALBUMIN 2.80*   GLOBULIN 2.8   ALT (SGPT) 12   AST (SGOT) 13   BILIRUBIN <0.2   ALK PHOS 73                     Brief Urine Lab Results  (Last result in the past 365 days)      Color   Clarity   Blood   Leuk Est    Nitrite   Protein   CREAT   Urine HCG        06/12/22 2056 Yellow   Clear     Negative                     Microbiology Results Abnormal     Procedure Component Value - Date/Time    Anaerobic Culture - Tissue, Toe, Left [[id number]]  (Normal) Collected: 12/05/22 1837    Lab Status: Preliminary result Specimen: Tissue from Toe, Left Updated: 12/08/22 0738     Anaerobic Culture No anaerobes isolated at 3 days    AFB Culture - Tissue, Toe, Left [[id number]] Collected: 12/05/22 1837    Lab Status: Preliminary result Specimen: Tissue from Toe, Left Updated: 12/06/22 1328     AFB Stain No acid fast bacilli seen on concentrated smear          No radiology results from the last 24 hrs        I have reviewed the medications:  Scheduled Meds:amLODIPine, 10 mg, Oral, Q24H  apixaban, 2.5 mg, Oral, BID  cefepime, 2 g, Intravenous, Q8H  hydrALAZINE, 25 mg, Oral, Q8H  insulin detemir, 15 Units, Subcutaneous, Nightly  insulin lispro, 0-7 Units, Subcutaneous, TID AC  lisinopril, 40 mg, Oral, Daily  melatonin, 5 mg, Oral, Nightly  OLANZapine, 5 mg, Oral, BID  QUEtiapine, 12.5 mg, Oral, Daily  senna-docusate sodium, 2 tablet, Oral, Nightly  sodium chloride, 10 mL, Intravenous, Q12H  vancomycin, 1,000 mg, Intravenous, Q12H      Continuous Infusions:lactated ringers, 9 mL/hr, Last Rate: 125 mL/hr (12/05/22 1657)  Pharmacy Consult,   Pharmacy to dose vancomycin,       PRN Meds:.•  acetaminophen **OR** acetaminophen  •  bisacodyl  •  dextrose  •  dextrose  •  glucagon (human recombinant)  •  haloperidol  •  HYDROmorphone **AND** naloxone  •  LORazepam  •  melatonin  •  ondansetron **OR** ondansetron  •  oxyCODONE  •  Pharmacy Consult  •  Pharmacy to dose vancomycin  •  polyethylene glycol  •  sodium chloride  •  sodium chloride    Assessment & Plan   Assessment & Plan     Active Hospital Problems    Diagnosis  POA   • **Acute osteomyelitis of left foot (HCC) [M86.172]  Yes   • Neurocognitive disorder [R41.9]  Yes   • Type 2 diabetes  mellitus (HCC) [E11.9]  Yes   • Essential hypertension [I10]  Yes      Resolved Hospital Problems   No resolved problems to display.        Brief Hospital Course to date:  Omkar Nava is a 67 y.o. male with a history of dementia, T2DM, HTN, and polysubstance abuse who was admitted here on 12/5 for planned transmetatarsal amputation of the left foot due to osteomyelitis    This patient's problems and plans were partially entered by my partner and updated as appropriate by me 12/08/22.  All problems are new to me today     Osteomyelitis of left foot  S/p Left Transmetatarsal amputation  -S/p transmetatarsal amputation of left foot on 12/5 by orthopedics, Dr Petty  -ID following, continue antibiotics: Vancomycin and Cefepime until 1/18/2023. Patient to follow-up with Dr Gordon 2 weeks post discharge   -Pain control  -PT/OT, plan is rehab     HTN  -Continue current mediations      T2DM  -continue levemir and SSI     Neurocognitive disorder, psychosis  -prn Haldol and Ativan for agitation    Chronic Anemia  -Monitor H&H intermittently     Expected Discharge Location and Transportation: SNF  Expected Discharge Date: 12/9  DVT prophylaxis:  Medical and mechanical DVT prophylaxis orders are present.     AM-PAC 6 Clicks Score (PT): 8 (12/07/22 1121)    CODE STATUS:   There are no questions and answers to display.       Abigail Claire, DO  12/08/22

## 2022-12-08 NOTE — THERAPY TREATMENT NOTE
Patient Name: Omkar Nava  : 1955    MRN: [id number]                              Today's Date: 2022       Admit Date: 2022    Visit Dx:     ICD-10-CM ICD-9-CM   1. 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, without long-term current use of insulin   • Orthostatic hypotension   • Acute osteomyelitis of left foot (HCC)   • Type 2 diabetes mellitus (HCC)   • Essential hypertension   • Psychotic disorder (HCC)   • Neurocognitive disorder     Past Medical History:   Diagnosis Date   • Anemia    • Dementia (HCC)    • Diabetes mellitus (HCC)    • Dysphagia    • GERD (gastroesophageal reflux disease)    • History of alcohol abuse    • History of cocaine use    • History of marijuana use    • Hypertension    • Osteomyelitis (HCC)    • Poor historian     records obtained from nursing home records & his family   • Visual impairment      Past Surgical History:   Procedure Laterality Date   • AMPUTATION FOOT Left 10/18/2022    Procedure: PARTIAL FIRST RAY AMPUTATION LEFT;  Surgeon: Yeison Petty MD;  Location:  [facility];  Service: Orthopedics;  Laterality: Left;   • AMPUTATION FOOT Left 2022    Procedure: Transmetatarsal of Left Foot;  Surgeon: Yeison Petty MD;  Location:  [facility];  Service: Orthopedics;  Laterality: Left;   • EYE SURGERY        General Information     Row Name 22 1058          Physical Therapy Time and Intention    Document Type therapy note (daily note)  -DM     Mode of Treatment physical therapy  -DM     Row Name 22          General Information    Existing Precautions/Restrictions fall;non-weight bearing;left  : TM amp L foot;NWB LLE;Dem.;Leg.Blind;Per sister,incont BM(use Depends for mobil);pure wick  -DM           User Key  (r) = Recorded By,  (t) = Taken By, (c) = Cosigned By    Initials Name Provider Type    DM Demetra Matta, PT Physical Therapist               Mobility     Row Name 12/08/22 1058          Bed Mobility    Bed Mobility rolling left;rolling right;scooting/bridging  -DM     Rolling Left [place] (Bed Mobility) verbal cues;maximum assist (25% patient effort);2 person assist;nonverbal cues (demo/gesture)  placed Depends,lift sling,pad  -DM     Rolling Right [place] (Bed Mobility) verbal cues;maximum assist (25% patient effort);2 person assist;nonverbal cues (demo/gesture)  lift sling & Depends reposn.  -DM     Scooting/Bridging [place] (Bed Mobility) verbal cues;maximum assist (25% patient effort);2 person assist  scooted to HOB w/ draw sheet;Pt assist via 1/2-bridg.RLE (Foot stabilized)  -DM     Assistive Device (Bed Mobility) bed rails;draw sheet;head of bed elevated  -DM     Comment, (Bed Mobility) Noted soiled lift sling(issued new sling, Depends d/t incont BM,& mask);pt stating he is wet but no leaking noted from pure wick;c/o drowsy, & noted slow deliberate muffled speech;o x1 + mo.,but not recalling loc.or yr.even after cued twice during session;respirex 500--750 cc;coban L wrist IV too tight (rex.edema);PCT rewrapped s/p BG check;nsg gave insulin shot  -DM     Row Name 12/08/22 1058          Transfers    Comment, (Transfers) cues for HP/Seq w/ STS trials x 2 from chair s/p transf UIC w/ sling (d/t unable to perform SPT on RLE yest)  -DM     Row Name 12/08/22 1058          Bed-Chair Transfer    Bed-Chair [place] (Transfers) verbal cues;dependent (less than 25% patient effort);2 person assist  -DM     Assistive Device (Bed-Chair Transfers) lift device  -DM     Row Name 12/08/22 1058          Sit-Stand Transfer    Sit-Stand [place] (Transfers) verbal cues;maximum assist (25% patient effort)  -DM     Assistive Device (Sit-Stand Transfers) walker, front-wheeled  -DM     Comment, (Sit-Stand Transfer) pillow under  LLE to remind of  NWB;2 STS trials w/ 3 min sit rest btwn (cleared hips 1 in.from chair, then 2 in.from chair, but starting to put wt.through LLE despite cues for NWB, & lowered to sitting @ trial);pads under hips reposn during stance  -DM     Row Name 12/08/22 1058          Gait/Stairs (Locomotion)    [place] (Gait) unable to assess  -DM     Row Name 12/08/22 1058          Mobility    Extremity Weight-bearing Status left lower extremity  -DM     Left Lower Extremity (Weight-bearing Status) non weight-bearing (NWB)  -DM           User Key  (r) = Recorded By, (t) = Taken By, (c) = Cosigned By    Initials Name Provider Type    DM Demetra Matta, PT Physical Therapist               Obj/Interventions     Row Name 12/08/22 1058          Motor Skills    Therapeutic Exercise hip;knee;ankle;shoulder  Did UE exer as well(OT not seeing today)  -DM     Row Name 12/08/22 1058          Shoulder (Therapeutic Exercise)    Shoulder (Therapeutic Exercise) AROM (active range of motion)  -DM     Shoulder AROM (Therapeutic Exercise) bilateral;extension;aDduction;sitting;10 repetitions;other (see comments)  Biceps curls, exer  -DM     Shoulder AAROM (Therapeutic Exercise) bilateral;flexion;aBduction;horizontal aBduction/aDduction;sitting;10 repetitions  -DM     Row Name 12/08/22 1058          Hip (Therapeutic Exercise)    Hip (Therapeutic Exercise) AROM (active range of motion);AAROM (active assistive range of motion);isometric exercises  -DM     Hip AROM (Therapeutic Exercise) bilateral;extension;sitting;10 repetitions  grav asst ext w/ sit.march  -DM     Hip AAROM (Therapeutic Exercise) bilateral;flexion;extension;aBduction;aDduction;external rotation;internal rotation;sitting;10 repetitions;supine;other (see comments)  also B BKFO, 1/2-bridging RLE, Sitting march  -DM     Hip Isometrics (Therapeutic Exercise) bilateral;gluteal sets;10 repetitions;sitting  -DM     Row Name 12/08/22 1058          Knee (Therapeutic  Exercise)    Knee (Therapeutic Exercise) AROM (active range of motion);isometric exercises  -DM     Knee AAROM (Therapeutic Exercise) flexion;extension;sitting;10 repetitions;bilateral;supine  min A for R h.slides & SAQ; Mod A for SAQ& L h.slides to supp leg/maintain NWB L foot  -DM     Knee Isometrics (Therapeutic Exercise) bilateral;quad sets;sitting;10 repetitions;hamstring sets;other (see comments)  Mod TC's & VC's for HS  -DM     Row Name 12/08/22 1058          Ankle (Therapeutic Exercise)    Ankle (Therapeutic Exercise) AROM (active range of motion);AAROM (active assistive range of motion)  -DM     Ankle AROM (Therapeutic Exercise) right;dorsiflexion;plantarflexion;sitting;10 repetitions  -DM     Ankle AAROM (Therapeutic Exercise) right;sitting;10 repetitions;other (see comments)  AC  -DM     Row Name 12/08/22 1058          Balance    Balance Assessment sitting static balance;sitting dynamic balance;standing static balance;standing dynamic balance  -DM     Static Sitting Balance verbal cues;non-verbal cues (demo/gesture);minimal assist  Held trunk forw in chair  -DM     Dynamic Sitting Balance verbal cues;moderate assist;non-verbal cues (demo/gesture)  recip scooting  -DM     Position, Sitting Balance supported;sitting in chair  -DM     Static Standing Balance maximum assist;verbal cues;non-verbal cues (demo/gesture)  -DM     Position/Device Used, Standing Balance supported;walker, front-wheeled  -DM     Balance Interventions sitting;standing;static;dynamic;weight shifting activity  -DM           User Key  (r) = Recorded By, (t) = Taken By, (c) = Cosigned By    Initials Name Provider Type    Demetra Chacon, PT Physical Therapist               Goals/Plan    No documentation.                Clinical Impression     Row Name 12/08/22 1058          Pain    Pain Intervention(s) Medication (See MAR);Repositioned;Elevated;Rest  -DM     Additional Documentation Pain Scale: FACES Pre/Post-Treatment (Group)  -DM      Row Name 12/08/22 1058          Pain Scale: FACES Pre/Post-Treatment    Pain: FACES Scale, Pretreatment 2-->hurts little bit  -DM     Posttreatment Pain Rating 4-->hurts little more  -DM     Pain Location - Side/Orientation Left  -DM     Pain Location - foot;ankle  -DM     Row Name 12/08/22 1058          Plan of Care Review    Plan of Care Reviewed With patient  -DM     Progress improving  -DM     Outcome Evaluation Rolled L/R w/ max 2A to place Depends & lift sling, [facility]S trials x 2 w/ R wx & max A ( 3 min.sit rest btwn) while attempting to maintain NWB LLE (Pillow under L foot to cue), but only able to clear buttocks 1 inch 1st trial  & 2 in.2nd trial before beginning to place wt on LLE. Noted L foot pain 4/10 & low HGB 7.7.  -DM     Row Name 12/08/22 1058          Vital Signs    Pre Systolic BP Rehab 151  -DM     Pre Treatment Diastolic BP 93  -DM     Pretreatment Heart Rate (beats/min) 64  -DM     Pre SpO2 (%) 98  -DM     O2 Delivery Pre Treatment nasal cannula  -DM     O2 Delivery Intra Treatment nasal cannula  -DM     O2 Delivery Post Treatment nasal cannula  -DM     Pre Patient Position Supine  -DM     Intra Patient Position Standing  -DM     Post Patient Position Sitting  -DM     Rest Breaks  2  -DM     Row Name 12/08/22 1058          Positioning and Restraints    Pre-Treatment Position in bed  -DM     Post Treatment Position chair  -DM     In Chair notified nsg;reclined;call light within reach;encouraged to call for assist;exit alarm on;with other staff;RUE elevated;LUE elevated;waffle cushion;on mechanical lift sling;legs elevated  -DM           User Key  (r) = Recorded By, (t) = Taken By, (c) = Cosigned By    Initials Name Provider Type    DM Demetra Matta, PT Physical Therapist               Outcome Measures     Row Name 12/08/22 1058 12/08/22 0800       How much help from another person do you currently need...    Turning from your back to your side while in flat bed  without using bedrails? 2  -DM 2  -KF    Moving from lying on back to sitting on the side of a flat bed without bedrails? 2  -DM 2  -KF    Moving to and from a bed to a chair (including a wheelchair)? 1  -DM 1  -KF    Standing up from a chair using your arms (e.g., wheelchair, bedside chair)? 2  -DM 1  -KF    Climbing 3-5 steps with a railing? 1  -DM 1  -KF    To walk in hospital room? 1  -DM 1  -KF    AM-PAC 6 Clicks Score (PT) 9  -DM 8  -KF    Highest level of mobility 3 --> Sat at edge of bed  -DM 3 --> Sat at edge of bed  -KF    Row Name 12/08/22 1058          Functional Assessment    Outcome Measure Options AM-PAC 6 Clicks Basic Mobility (PT)  -DM           User Key  (r) = Recorded By, (t) = Taken By, (c) = Cosigned By    Initials Name Provider Type    DM Demetra Matta, PT Physical Therapist    Justina Ramirez RN Registered Nurse                             Physical Therapy Education     Title: PT OT SLP Therapies (In Progress)     Topic: Physical Therapy (In Progress)     Point: Mobility training (In Progress)     Learning Progress Summary           Patient Acceptance, E,D, NR by DM at 12/8/2022 1231    Acceptance, E,TB, VU by KF at 12/8/2022 1033    Acceptance, E,D, NR by DM at 12/7/2022 1414   Family Acceptance, E,D, NR by DM at 12/7/2022 1414                   Point: Home exercise program (In Progress)     Learning Progress Summary           Patient Acceptance, E,D, NR by DM at 12/8/2022 1231    Acceptance, E,TB, VU by KF at 12/8/2022 1033    Acceptance, E,D, NR by DM at 12/7/2022 1414   Family Acceptance, E,D, NR by DM at 12/7/2022 1414                   Point: Body mechanics (In Progress)     Learning Progress Summary           Patient Acceptance, E,D, NR by DM at 12/8/2022 1231    Acceptance, E,TB, VU by KF at 12/8/2022 1033    Acceptance, E,D, NR by DM at 12/7/2022 1414   Family Acceptance, E,D, NR by DM at 12/7/2022 1414                   Point: Precautions (In Progress)     Learning Progress  Summary           Patient Acceptance, E,D, NR by DM at 12/8/2022 1231    Acceptance, E,TB, VU by KF at 12/8/2022 1033    Acceptance, E,D, NR by DM at 12/7/2022 1414   Family Acceptance, E,D, NR by DM at 12/7/2022 1414                               User Key     Initials Effective Dates Name Provider Type Discipline    DM 06/16/21 -  Demetra Matta PT Physical Therapist PT     06/24/22 -  Justina Clark RN Registered Nurse Nurse              PT Recommendation and Plan  Planned Therapy Interventions (PT): balance training, bed mobility training, gait training, home exercise program, patient/family education, strengthening, transfer training  Plan of Care Reviewed With: patient  Progress: improving  Outcome Evaluation: Rolled L/R w/ max 2A to place Depends & lift sling, transf to chair via mech lift, STS trials x 2 w/ R wx & max A ( 3 min.sit rest btwn) while attempting to maintain NWB LLE (Pillow under L foot to cue), but only able to clear buttocks 1 inch 1st trial  & 2 in.2nd trial before beginning to place wt on LLE. Noted L foot pain 4/10 & low HGB 7.7.     Time Calculation:    PT Charges     Row Name 12/08/22 1231             Time Calculation    Start Time 1058  -DM      PT Received On 12/08/22  -DM      PT Goal Re-Cert Due Date 12/17/22  -DM         Time Calculation- PT    Total Timed Code Minutes- PT 54 minute(s)  -DM         Timed Charges    27585 - PT Therapeutic Exercise Minutes 24  -DM      56558 - PT Therapeutic Activity Minutes 30  -DM         Total Minutes    Timed Charges Total Minutes 54  -DM       Total Minutes 54  -DM            User Key  (r) = Recorded By, (t) = Taken By, (c) = Cosigned By    Initials Name Provider Type    DM Demetra Matta, PT Physical Therapist              Therapy Charges for Today     Code Description Service Date Service Provider Modifiers Qty    72860337457 HC PT THER PROC EA 15 MIN 12/7/2022 Demetra Matta PT GP 1    83092584964 HC PT THERAPEUTIC ACT EA 15 MIN  12/7/2022 Demetra Matta, PT GP 2    47381850080 HC PT EVAL MOD COMPLEXITY 4 12/7/2022 Demetra Matta, PT GP 1    24760697364 HC PT THER PROC EA 15 MIN 12/8/2022 Demetra Matta, PT GP 2    28854095771 HC PT THERAPEUTIC ACT EA 15 MIN 12/8/2022 Demetra Matta, PT GP 2          PT G-Codes  Outcome Measure Options: AM-PAC 6 Clicks Basic Mobility (PT)  AM-PAC 6 Clicks Score (PT): 9  AM-PAC 6 Clicks Score (OT): 9  PT Discharge Summary  Anticipated Discharge Disposition (PT): inpatient rehabilitation facility    Demetra Matta, PT  12/8/2022

## 2022-12-08 NOTE — PROGRESS NOTES
"          Orthopaedic Surgery Progress Note    LOS: 3 days   Patient Care Team:  Provider, No Known as PCP - General  3 Days Post-Op   Procedure(s):  Transmetatarsal amputation of Left Foot  Subjective   Interval History:   In bed resting. No complaints. No acute events.     Objective     Vital Signs:  Temp (24hrs), Av.8 °F (36.6 °C), Min:97.1 °F (36.2 °C), Max:98.4 °F (36.9 °C)    /93 (BP Location: Left arm, Patient Position: Lying)   Pulse 64   Temp 98 °F (36.7 °C)   Resp 16   Ht 182.9 cm (72\")   Wt 81.6 kg (180 lb)   SpO2 98%   BMI 24.41 kg/m²     Labs:  Lab Results (last 24 hours)     Procedure Component Value Units Date/Time    POC Glucose Once [[id number]]  (Normal) Collected: 22 0758    Specimen: Blood Updated: 22 0809     Glucose 105 mg/dL      Comment: Meter: [id number] : [id number] Yasmin Ware       Anaerobic Culture - Tissue, Toe, Left [[id number]]  (Normal) Collected: 22    Specimen: Tissue from Toe, Left Updated: 22 0738     Anaerobic Culture No anaerobes isolated at 3 days    Tissue / Bone Culture - Tissue, Toe, Left [[id number]]  (Abnormal) Collected: 22    Specimen: Tissue from Toe, Left Updated: 22 0700     Tissue Culture Heavy growth (4+) Kocuria kristinae     Gram Stain Moderate (3+) WBCs seen      No organisms seen    Narrative:      Kocuria are Sensitive to Doxycycline, Ceftriaxone, Cefuroxime, Amikacin, and Amoxicillin with Clavulanic Acid, but are usually Resistant to Ampicillin and Erythromycin.      POC Glucose Once [[id number]]  (Abnormal) Collected: 22    Specimen: Blood Updated: 22     Glucose 187 mg/dL      Comment: Meter: [id number] : [id number] Ted Thomas       POC Glucose Once [[id number]]  (Abnormal) Collected: 22    Specimen: Blood Updated: 22 173     Glucose 227 mg/dL      Comment: Meter: [id number] : [id number] Caren Toledo       POC Glucose Once [[id number]]  " (Abnormal) Collected: 12/07/22 1227    Specimen: Blood Updated: 12/07/22 1228     Glucose 223 mg/dL      Comment: Meter: [id number] : [id number] Caren Toledo       Comprehensive Metabolic Panel [[id number]]  (Abnormal) Collected: 12/07/22 0811    Specimen: Blood Updated: 12/07/22 0850     Glucose 112 mg/dL      BUN 15 mg/dL      Creatinine 0.95 mg/dL      Sodium 143 mmol/L      Potassium 4.2 mmol/L      Chloride 110 mmol/L      CO2 28.0 mmol/L      Calcium 8.4 mg/dL      Total Protein 5.6 g/dL      Albumin 2.80 g/dL      ALT (SGPT) 12 U/L      AST (SGOT) 13 U/L      Alkaline Phosphatase 73 U/L      Total Bilirubin <0.2 mg/dL      Globulin 2.8 gm/dL      Comment: Calculated Result        A/G Ratio 1.0 g/dL      BUN/Creatinine Ratio 15.8     Anion Gap 5.0 mmol/L      eGFR 88.8 mL/min/1.73      Comment: National Kidney Foundation and American Society of Nephrology (ASN) Task Force recommended calculation based on the Chronic Kidney Disease Epidemiology Collaboration (CKD-EPI) equation refit without adjustment for race.       Narrative:      GFR Normal >60  Chronic Kidney Disease <60  Kidney Failure <15      CK [[id number]]  (Abnormal) Collected: 12/07/22 0811    Specimen: Blood Updated: 12/07/22 0850     Creatine Kinase 231 U/L     Vancomycin, Random [[id number]]  (Normal) Collected: 12/07/22 0811    Specimen: Blood Updated: 12/07/22 0850     Vancomycin Random 19.70 mcg/mL     Narrative:      Therapeutic Ranges for Vancomycin    Vancomycin Random   5.0-40.0 mcg/mL  Vancomycin Trough   5.0-20.0 mcg/mL  Vancomycin Peak     20.0-40.0 mcg/mL    C-reactive Protein [[id number]]  (Abnormal) Collected: 12/07/22 0811    Specimen: Blood Updated: 12/07/22 0850     C-Reactive Protein 0.95 mg/dL           Physical Exam:  left LE: Splint taken down for exam   Leg compartments soft/compressible   Skin at amp site WWP   Sutures in place, incision clean and intact, no erythema   CR brisk at skin edges of incision site      Assessment/Plan:  3 Days Post-Op status post:  Procedure(s):  Transmetatarsal amputation of Left Foot    -Dressing changed 12/8  -NWB operative extremity  -Advance diet as tolerated  -Keep splint/operative dressing clean and dry  -DVT prophylaxis, Eliquis  -Continue antibiotics per ID recommendations, appreciate ID recs  -Pain control  -Follow-up Intra-Op cultures  -Elevate operative extremity  -Okay for DC from ortho standpoint, f/u information and DC instructions placed in chart, f/u in clinic in 2 weeks for wound check  -Rest of management per primary team    Yeison Petty MD  12/08/22  08:46 EST

## 2022-12-08 NOTE — PLAN OF CARE
Goal Outcome Evaluation:           Progress: improving  Outcome Evaluation: VSS, pleasantly confused, incontinent of urine, no c/o, pulled out IV, will replace IV, will continue to monitor, awaiting placement

## 2022-12-08 NOTE — PLAN OF CARE
Goal Outcome Evaluation:         No significant events overnight. VSS. Call light within reach. Will continue to monitor.

## 2022-12-08 NOTE — PLAN OF CARE
Goal Outcome Evaluation:  Plan of Care Reviewed With: patient        Progress: improving  Outcome Evaluation: Rolled L/R w/ max 2A to place Depends & lift sling, [facility]S trials x 2 w/ R wx & max A ( 3 min.sit rest btwn) while attempting to maintain NWB LLE (Pillow under L foot to cue), but only able to clear buttocks 1 inch 1st trial  & 2 in.2nd trial before beginning to place wt on LLE. Noted L foot pain 4/10 & low HGB 7.7.

## 2022-12-08 NOTE — CASE MANAGEMENT/SOCIAL WORK
Continued Stay Note  [facility]     Patient Name: Omkar Nava  MRN: [id number]  Today's Date: 12/8/2022    Admit Date: 12/5/2022    Plan: SNF   Discharge Plan     Row Name 12/08/22 1825       Plan    Plan SNF    Patient/Family in Agreement with Plan yes    Plan Comments I spoke with daughter, reports she has been in contact with [facility], and they will be able to take Mr. Nava back to facility.  Will have SW Polina Emerson call facility in am to verify/facilitate transfer and arrange transportation.  Teresa Ramon, Ext. [phone]    Final Discharge Disposition Code 03 - skilled nursing facility (SNF)               Discharge Codes    No documentation.               Expected Discharge Date and Time     Expected Discharge Date Expected Discharge Time    Dec 13, 2022             LUCINDA Stout

## 2022-12-08 NOTE — PROGRESS NOTES
INFECTIOUS DISEASE PROGRESS NOTE    Omkar Nava  1955  [id number]    Consult: 12/6/22, 10/15/22  Admit date: 12/5/2022    Requesting Provider: Omkar Seth MD  Evaluating physician:  Rayray Gordon MD  Reason for Consultation: left foot osteomyelitis, first toe, distal phalanx, reoperation with transmetatarsal amputation 12/5/2022 for poor wound healing and exposed bone  Chief Complaint: left foot pain      Subjective   History of present illness:  Patient is a  67 y.o. male with h/o T2DM, PN, HTN, normocytic anemia, and GERD who presented to [facility] ED on 10/14 for worsening left foot pain.  The patient is a poor historian and most of the information was taken from the chart.  He tripped at his nursing facility, [facility], while running the street in flip-flops and sustained a laceration of his first webspace.  He was taken to [facility] ED on 9/17/22 and found to have a small intra-articular vertical fracture through the base the the great toe proximal phalanx. Podiatry, Dr. Ruiz, saw patient and the wound with irrigated with suture placement.  The area was dressed and he was placed in a surgical shoe.  He was given Cefazolin x 1 dose and discharged back to his facility on Keflex for 7 days.  On 9/26/22, the nursing staff at increased pain, swelling, and redness around the foot and sent him to [facility] ED on that day.  He was found to have a displaced fracture of proximal phalanx of left hallux along with abscess and gas in tissues.  He was admitted to the hospital and underwent Left foot debridement and I and D on 9/27/22 by Dr. Ruiz with culture positive for MRSA (Resistant to Cefazolin, clindamycin, erm, oxacillin, tetracycline, Bactrim and sensitive to Vancomycin, Daptomycin-KB 1, and Linezolid KB 2), Corynebacterium striatum, and Enterococcus faecalis (sens to Vanc and ampicillin).  He underwent a second debridement and I and D on 9/30.  He was given  Cefazolin in ED and then changed to Daptomycin.  A PICC line was placed on 10/3 for Daptomycin infusions, but the antibiotic was too expensive for the SNF, so he was changed to oral Zyvox and Rifampin until 10/26.  The PICC line was removed on 10/4.  His blood cultures remained negative.  He was discharged back to his nursing facility on 10/4/22.      He was sent to [facility] ED on 10/14 after nursing staff noted that his left hallux appeared necrotic.  He has had no fever, chills, shortness of breath, nausea, vomiting, diarrhea, dysuria, or worsening foot pain.  On arrival, he is afebrile and hemodynamically stable.  On 10/15, his BP went up to 203/81.  Admitting labs were WBC 7200 with 69% neutrophils, ESR 67, CRP 3.07, Hgb 9.2, PCT 0.05, lactic acid 2.3, and creatinine 1.21.  A MRSA PCR was negative.  Blood and wound cultures are pending.  An MRI of the left foot on 10/15 showed wound around the left great toe with marrow signal alteration within the distal aspect of 1st MT as well as both phalanges c/w osteomyelitis and scattered foci in soft tissues that may be foci of gas without evidence of fluid collections or abscesses. An Xray of left foot showed soft tissue swelling of 1st digit with displaced fracture of medial base of 1st proximal phalanx.  He is currently on Cefepime and Vancomycin.  ID was asked by Dr. Seth on 10/15 to evaluate and manage his antibiotic therapy.  Patient has had some issues with psychosis over the past 6 months possibly related to drug and alcohol use.  This is also interfered with his acceptance of care.    The patient underwent a left first ray resection on 10/18/2022.  The patient was subsequently treated with vancomycin and ceftriaxone until 11/25/2022 for cultures which grew from his left foot with MRSA, Klebsiella, and Raoultella ornithinolytica.  His foot healed and then he developed left foot wound dehiscence with exposed bone.  He was readmitted to [facility] on  12/5/2022 and underwent a transmetatarsal amputation.  I was consulted on 12/6/2022 for further evaluation and treatment.  Patient is a poor historian.  He has no other localizing signs or symptoms of infection.    12/7/2022 history reviewed.  No high fevers or chills.  Pain controlled left foot.  Tolerating vancomycin and cefepime until 1/18/2023 or shorter.  Status post left foot transmetatarsal amputation 12/5.    12/8/2022 history reviewed.  Tolerating antibiotics.  Changing to vancomycin and ceftriaxone to continue until 1/18/2023 or shorter.  No fever.    Past Medical History:   Diagnosis Date    Anemia     Dementia (HCC)     Diabetes mellitus (HCC)     Dysphagia     GERD (gastroesophageal reflux disease)     History of alcohol abuse     History of cocaine use     History of marijuana use     Hypertension     Osteomyelitis (HCC)     Poor historian     records obtained from nursing home records & his family    Visual impairment    HTN  Psychotic d/o, unspecified.    Past Surgical History:   Procedure Laterality Date    AMPUTATION FOOT Left 10/18/2022    Procedure: PARTIAL FIRST RAY AMPUTATION LEFT;  Surgeon: Yeison Petty MD;  Location:  [facility];  Service: Orthopedics;  Laterality: Left;    AMPUTATION FOOT Left 12/5/2022    Procedure: Transmetatarsal of Left Foot;  Surgeon: Yeison Petty MD;  Location:  [facility];  Service: Orthopedics;  Laterality: Left;    EYE SURGERY     Left foot debridement/I and D x 2 9/27/22 and 9/30/22.  Left first ray amputation 10/18/2022.    Pediatric History   Patient Parents    Not on file     Other Topics Concern    Not on file   Social History Narrative    Caffeine 0-1 servings per day    Patient lives at home .   Patient lives at [facility]  Former smoker, no alcohol, smokes marijuana.     family history includes Diabetes in his brother, brother, father, maternal grandfather, maternal grandmother, mother, and sister; Hypertension in his brother, brother, father,  and mother.    No Known Allergies      There is no immunization history on file for this patient.    Medication:    Current Facility-Administered Medications:     acetaminophen (TYLENOL) tablet 650 mg, 650 mg, Oral, Q4H PRN **OR** acetaminophen (TYLENOL) suppository 650 mg, 650 mg, Rectal, Q4H PRN, Yeison Petty MD    amLODIPine (NORVASC) tablet 10 mg, 10 mg, Oral, Q24H, 10 mg at 12/08/22 0947 **AND** [DISCONTINUED] lisinopril (PRINIVIL,ZESTRIL) tablet 20 mg, 20 mg, Oral, Q24H, Yeison Petty MD, 20 mg at 12/06/22 0918    apixaban (ELIQUIS) tablet 2.5 mg, 2.5 mg, Oral, BID, Yeison Petty MD, 2.5 mg at 12/08/22 0947    bisacodyl (DULCOLAX) suppository 10 mg, 10 mg, Rectal, Daily PRN, Yeison Petty MD    cefepime (MAXIPIME) 2 g/100 mL 0.9% NS (mbp), 2 g, Intravenous, Q8H, Rayray Gordon MD, 2 g at 12/08/22 1119    dextrose (D50W) (25 g/50 mL) IV injection 25 g, 25 g, Intravenous, Q15 Min PRN, Tea Bentley PA-C    dextrose (GLUTOSE) oral gel 15 g, 15 g, Oral, Q15 Min PRN, Tea Bentley PA-C    glucagon (human recombinant) (GLUCAGEN DIAGNOSTIC) injection 1 mg, 1 mg, Intramuscular, Q15 Min PRN, Tea Bentley PA-C    haloperidol (HALDOL) 2 MG/ML solution 2 mg, 2 mg, Oral, Daily PRN, Yeison Petty MD    hydrALAZINE (APRESOLINE) tablet 25 mg, 25 mg, Oral, Q8H, Marilyn Chopra DO, 25 mg at 12/07/22 2156    HYDROmorphone (DILAUDID) injection 0.5 mg, 0.5 mg, Intravenous, Q2H PRN, 0.5 mg at 12/06/22 0714 **AND** naloxone (NARCAN) injection 0.1 mg, 0.1 mg, Intravenous, Q5 Min PRN, Yeison Petty MD    insulin detemir (LEVEMIR) injection 15 Units, 15 Units, Subcutaneous, Nightly, MiniDiane MD, 15 Units at 12/07/22 2157    Insulin Lispro (humaLOG) injection 0-7 Units, 0-7 Units, Subcutaneous, TID AC, Tea Bentley PA-C, 2 Units at 12/08/22 1217    lactated ringers infusion, 9 mL/hr, Intravenous, Continuous, Yeison Petty MD, Last Rate: 125 mL/hr at 12/05/22 1657,  New Bag at 12/05/22 1657    lisinopril (PRINIVIL,ZESTRIL) tablet 40 mg, 40 mg, Oral, Daily, Yeison Petty MD, 40 mg at 12/07/22 2156    LORazepam (ATIVAN) tablet 2 mg, 2 mg, Oral, Q6H PRN, Yeison Petty MD, 2 mg at 12/07/22 1256    melatonin tablet 5 mg, 5 mg, Oral, Nightly, Yeison Petty MD, 5 mg at 12/07/22 2156    melatonin tablet 5 mg, 5 mg, Oral, Nightly PRN, Yeison Petty MD    OLANZapine (zyPREXA) tablet 5 mg, 5 mg, Oral, BID, Yeison Petty MD, 5 mg at 12/08/22 0947    ondansetron (ZOFRAN) tablet 4 mg, 4 mg, Oral, Q6H PRN **OR** ondansetron (ZOFRAN) injection 4 mg, 4 mg, Intravenous, Q6H PRN, Yeison Petty MD    oxyCODONE (ROXICODONE) immediate release tablet 5 mg, 5 mg, Oral, Q4H PRN, Yeison Petty MD, 5 mg at 12/07/22 1256    Pharmacy Consult, , Does not apply, Continuous PRN, Rayray Gordon MD    Pharmacy to dose vancomycin, , Does not apply, Continuous PRN, Rayray Gordon MD    polyethylene glycol (MIRALAX) packet 17 g, 17 g, Oral, PRN, Yeison Petty MD    QUEtiapine (SEROquel) tablet 12.5 mg, 12.5 mg, Oral, Daily, Tea Bentley PA-C, 12.5 mg at 12/08/22 0947    sennosides-docusate (PERICOLACE) 8.6-50 MG per tablet 2 tablet, 2 tablet, Oral, Nightly, Yeison Petty MD, 2 tablet at 12/07/22 2156    sodium chloride 0.9 % flush 10 mL, 10 mL, Intravenous, Q12H, Yeison Petty MD, 10 mL at 12/08/22 0947    sodium chloride 0.9 % flush 10 mL, 10 mL, Intravenous, PRN, Yeison Petty MD    sodium chloride 0.9 % infusion 40 mL, 40 mL, Intravenous, PRN, Yeison Petty MD    vancomycin (VANCOCIN) 1000 mg/200 mL dextrose 5% IVPB, 1,000 mg, Intravenous, Q12H, Ijeoma Kuhn, PharmD, 1,000 mg at 12/08/22 0946    Please refer to the medical record for a full medication list    Review of Systems:  Unable to get a reliable history as patient is responding with yes and no to basic questions and has a psychotic disorder.  Denies fever, chills, nausea, vomiting, diarrhea, shortness of breath,  "dysuria, or rashes.  Denies pain.    Physical Exam:   Vital Signs   Temp:  [97.1 °F (36.2 °C)-98 °F (36.7 °C)] 98 °F (36.7 °C)  Heart Rate:  [61-71] 64  Resp:  [16-18] 16  BP: (130-151)/(63-93) 151/93    Temp  Min: 97.1 °F (36.2 °C)  Max: 98 °F (36.7 °C)  BP  Min: 130/63  Max: 151/93  Pulse  Min: 61  Max: 71  Resp  Min: 16  Max: 18  SpO2  Min: 94 %  Max: 98 %    Blood pressure 151/93, pulse 64, temperature 98 °F (36.7 °C), resp. rate 16, height 182.9 cm (72\"), weight 81.6 kg (180 lb), SpO2 98 %.  GENERAL: Awake and alert, in moderate distress. Appears older than stated age.  Resting in bed.  Trying to eat dinner.  HEENT:  Normocephalic, atraumatic.  Oropharynx without thrush.   EYES: PERRLA.  No conjunctival injection. No icterus. EOM full.  LYMPHATICS: No lymphadenopathy of the neck or axillary or inguinal regions.   HEART: No murmur, gallop, or pericardial friction rub. Reg rate rhythm.  No JVD.  LUNGS: Clear to auscultation and percussion. No respiratory distress, no use of accessory muscles.  No rhonchi.  ABDOMEN: Soft, nontender, nondistended. No appreciable HSM.  Bowel sounds normal.  No masses.  SKIN: Warm and dry without cutaneous eruptions.   Left foot dressing in place, right arm PICC okay placed 10/27.  Splint in place.  No drainage.  PSYCHIATRIC: Mental status with occasional confusion.  But overall pleasant.  Follows commands.    EXT: Left foot surgical dressing in place.  No crepitus, or foul smell.  NEURO: Oriented to name, nonfocal.  Follows some commands and pleasant with me.  Nonfocal.        Results Review:   I reviewed the patient's new clinical results.  I reviewed the patient's new imaging results and agree with the interpretation.  I reviewed the patient's other test results and agree with the interpretation    Results from last 7 days   Lab Units 12/07/22  0811 12/06/22  0503 12/05/22  1138   WBC 10*3/mm3 8.12 11.88* 8.63   HEMOGLOBIN g/dL 7.7* 8.2* 9.8*   HEMATOCRIT % 23.9* 25.0* 30.4* "   PLATELETS 10*3/mm3 248 287 333     Results from last 7 days   Lab Units 12/07/22  0811   SODIUM mmol/L 143   POTASSIUM mmol/L 4.2   CHLORIDE mmol/L 110*   CO2 mmol/L 28.0   BUN mg/dL 15   CREATININE mg/dL 0.95   GLUCOSE mg/dL 112*   CALCIUM mg/dL 8.4*     Results from last 7 days   Lab Units 12/07/22  0811   ALK PHOS U/L 73   BILIRUBIN mg/dL <0.2   ALT (SGPT) U/L 12   AST (SGOT) U/L 13         Results from last 7 days   Lab Units 12/07/22  0811   CRP mg/dL 0.95*     Results from last 7 days   Lab Units 12/07/22  0811   VANCOMYCIN RM mcg/mL 19.70         Estimated Creatinine Clearance: 89.5 mL/min (by C-G formula based on SCr of 0.95 mg/dL).  CPK      Common Labsle 12/7/22   Creatine Kinase 231 (A)   (A) Abnormal value               Procalitonin Results:       Brief Urine Lab Results  (Last result in the past 365 days)        Color   Clarity   Blood   Leuk Est   Nitrite   Protein   CREAT   Urine HCG        06/12/22 2056 Yellow   Clear     Negative                      No results found for: SITE, ALLENTEST, PHART, WPM9ALI, PO2ART, KTI2UOC, BASEEXCESS, H1VDNUEN, HGBBG, HCTABG, OXYHEMOGLOBI, METHHGBN, CARBOXYHGB, CO2CT, BAROMETRIC, MODALITY, FIO2     Microbiology:  Microbiology Results (last 10 days)       Procedure Component Value - Date/Time    Anaerobic Culture - Tissue, Toe, Left [[id number]]  (Normal) Collected: 12/05/22 1837    Lab Status: Preliminary result Specimen: Tissue from Toe, Left Updated: 12/08/22 0738     Anaerobic Culture No anaerobes isolated at 3 days    Tissue / Bone Culture - Tissue, Toe, Left [[id number]]  (Abnormal) Collected: 12/05/22 1837    Lab Status: Preliminary result Specimen: Tissue from Toe, Left Updated: 12/08/22 0700     Tissue Culture Heavy growth (4+) Kocuria kristinae     Gram Stain Moderate (3+) WBCs seen      No organisms seen    Narrative:      Kocuria are Sensitive to Doxycycline, Ceftriaxone, Cefuroxime, Amikacin, and Amoxicillin with Clavulanic Acid, but are usually Resistant  to Ampicillin and Erythromycin.      AFB Culture - Tissue, Toe, Left [[id number]] Collected: 12/05/22 183    Lab Status: Preliminary result Specimen: Tissue from Toe, Left Updated: 12/06/22 1328     AFB Stain No acid fast bacilli seen on concentrated smear          Blood and wound cultures 10/14 pending.       No results found for: TISSCXQ, CULTURES, CULTURE, BLOODCX, ANACX, BALCX, ACIDFASTCX, BODYFLDCX, CXREFLEX, FUNGUSCX, RESPCX, MRSACX, ROUTCX, BRCHWSHCLT, TISSUECX, URINECX, CMVCX, WOUNDCX, BCIDPCR, BFCULTURE, BLOODCULT(      Radiology:  Imaging Results (Last 72 Hours)       ** No results found for the last 72 hours. **            IMPRESSION:   Left hallux osteomyelitis after trauma/displaced fracture 9/17/22.  Developed gangrene and poor wound healing related to vascular disease and noncompliance.  Improved after surgery 10/18 with ray resection but then had dehiscence of wound with exposed bone.  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  Cx with MRSA, Enterococcus faecalis, and Corynebacterium striatum.  Initially treated with Daptomycin and discharge on oral Zyvox and oral Rifampin until 10/26/22.  Cx with Raoultella ornithinolytica (sens to Cefepime, ceftriaxone) and Klebsiella.  Status post left first ray resection 10/18/2022.   Left foot wound dehiscence with exposed metatarsal new on 12/4, status post transmetatarsal amputation 12/5/2022.  Culture positive for Kocuria (new), an uncommon pathogen.  Usually sensitive to vancomycin and cephalosporins.  Elevated inflammatory markers, related to above.  CRP 3.07 on 10/14.  CRP 3.10 on 10/26.  CRP 0.88 on 11/30.  Type 2 diabetes mellitus/peripheral neuropathy.  Affects wound healing.  Hyperglycemia, related to above.  Anemia of chronic disease.  Continues, and acute postoperative blood loss.  Essential hypertension.    Gastroesophageal reflux disease.  Psychosis since April 2022 reported by the patient's  sister, thought to be related to drug and alcohol use.  Was hospitalized previously at [facility].  Awaiting placement.  Has interfered with his care.  Leukocytosis, neutrophilic, resolved.  Hypocalcemia 8.4, ongoing.  CPK elevated 424 on 12/6, 231 on 12/7.    Plan:  Diagnostically, follow blood and wound cultures, imaging, physical examination, CBC, CMP, CRP, and cultures which were obtained at surgery on 12/5/2022.   Therapeutically, continue vancomycin and change cefepime to ceftriaxone with duration x6 weeks until 1/18/2021.   on 12/7.  Orthopedic surgery status post left first ray resection for osteomyelitis 10/18/2022, 12/5/2022.   Previously was a difficult placement.    Case management orders: Please arrange for skilled facility administration of antibiotics with vancomycin 1 g IV every 12 hours (keep trough between 12 and 18), ceftriaxone 2 g IV daily until 1/18/2023 for 6 weeks treatment of left foot osteomyelitis.  Check CBC, CMP, CRP, vancomycin trough weekly while on IV antibiotics.  Weekly PICC dressing changes.  Schedule follow-up with me in 2 weeks post discharge.    I discussed the patient's findings and my recommendations with patient and previously with his family.  He will need to be in a skilled facility to receive his antibiotics.    Our group would be pleased to follow this patient over the course of their hospitalization and assist with outpatient antimicrobial therapy, as indicated.  Side effects of medications discussed with nursing and previously with family.  Further recommendations depend on the results of the cultures and clinical course. Discussed with patient's sister and family.  Difficult issue with compliance in terms of wound care and staying off his foot.      Rayray Gordon MD  12/8/2022I

## 2022-12-09 LAB
ALBUMIN SERPL-MCNC: 2.8 G/DL (ref 3.5–5.2)
ALBUMIN/GLOB SERPL: 1 G/DL
ALP SERPL-CCNC: 92 U/L (ref 39–117)
ALT SERPL W P-5'-P-CCNC: 12 U/L (ref 1–41)
ANION GAP SERPL CALCULATED.3IONS-SCNC: 6 MMOL/L (ref 5–15)
AST SERPL-CCNC: 13 U/L (ref 1–40)
BASOPHILS # BLD AUTO: 0.02 10*3/MM3 (ref 0–0.2)
BASOPHILS NFR BLD AUTO: 0.2 % (ref 0–1.5)
BILIRUB SERPL-MCNC: <0.2 MG/DL (ref 0–1.2)
BUN SERPL-MCNC: 18 MG/DL (ref 8–23)
BUN/CREAT SERPL: 20.2 (ref 7–25)
CALCIUM SPEC-SCNC: 8.7 MG/DL (ref 8.6–10.5)
CHLORIDE SERPL-SCNC: 104 MMOL/L (ref 98–107)
CO2 SERPL-SCNC: 28 MMOL/L (ref 22–29)
CREAT SERPL-MCNC: 0.89 MG/DL (ref 0.76–1.27)
DEPRECATED RDW RBC AUTO: 44.7 FL (ref 37–54)
EGFRCR SERPLBLD CKD-EPI 2021: 95.1 ML/MIN/1.73
EOSINOPHIL # BLD AUTO: 0.25 10*3/MM3 (ref 0–0.4)
EOSINOPHIL NFR BLD AUTO: 3.1 % (ref 0.3–6.2)
ERYTHROCYTE [DISTWIDTH] IN BLOOD BY AUTOMATED COUNT: 14.3 % (ref 12.3–15.4)
GLOBULIN UR ELPH-MCNC: 2.8 GM/DL
GLUCOSE BLDC GLUCOMTR-MCNC: 159 MG/DL (ref 70–130)
GLUCOSE BLDC GLUCOMTR-MCNC: 178 MG/DL (ref 70–130)
GLUCOSE BLDC GLUCOMTR-MCNC: 189 MG/DL (ref 70–130)
GLUCOSE BLDC GLUCOMTR-MCNC: 237 MG/DL (ref 70–130)
GLUCOSE SERPL-MCNC: 237 MG/DL (ref 65–99)
HCT VFR BLD AUTO: 23.2 % (ref 37.5–51)
HGB BLD-MCNC: 7.4 G/DL (ref 13–17.7)
IMM GRANULOCYTES # BLD AUTO: 0.02 10*3/MM3 (ref 0–0.05)
IMM GRANULOCYTES NFR BLD AUTO: 0.2 % (ref 0–0.5)
LYMPHOCYTES # BLD AUTO: 1.66 10*3/MM3 (ref 0.7–3.1)
LYMPHOCYTES NFR BLD AUTO: 20.6 % (ref 19.6–45.3)
MCH RBC QN AUTO: 27.6 PG (ref 26.6–33)
MCHC RBC AUTO-ENTMCNC: 31.9 G/DL (ref 31.5–35.7)
MCV RBC AUTO: 86.6 FL (ref 79–97)
MONOCYTES # BLD AUTO: 0.7 10*3/MM3 (ref 0.1–0.9)
MONOCYTES NFR BLD AUTO: 8.7 % (ref 5–12)
NEUTROPHILS NFR BLD AUTO: 5.39 10*3/MM3 (ref 1.7–7)
NEUTROPHILS NFR BLD AUTO: 67.2 % (ref 42.7–76)
NRBC BLD AUTO-RTO: 0 /100 WBC (ref 0–0.2)
PLATELET # BLD AUTO: 272 10*3/MM3 (ref 140–450)
PMV BLD AUTO: 11 FL (ref 6–12)
POTASSIUM SERPL-SCNC: 4.3 MMOL/L (ref 3.5–5.2)
PROT SERPL-MCNC: 5.6 G/DL (ref 6–8.5)
RBC # BLD AUTO: 2.68 10*6/MM3 (ref 4.14–5.8)
SODIUM SERPL-SCNC: 138 MMOL/L (ref 136–145)
VANCOMYCIN SERPL-MCNC: 27.1 MCG/ML (ref 5–40)
WBC NRBC COR # BLD: 8.04 10*3/MM3 (ref 3.4–10.8)

## 2022-12-09 PROCEDURE — 80202 ASSAY OF VANCOMYCIN: CPT

## 2022-12-09 PROCEDURE — 25010000002 CEFTRIAXONE PER 250 MG: Performed by: INTERNAL MEDICINE

## 2022-12-09 PROCEDURE — 63710000001 INSULIN LISPRO (HUMAN) PER 5 UNITS: Performed by: PHYSICIAN ASSISTANT

## 2022-12-09 PROCEDURE — 85025 COMPLETE CBC W/AUTO DIFF WBC: CPT | Performed by: INTERNAL MEDICINE

## 2022-12-09 PROCEDURE — 80053 COMPREHEN METABOLIC PANEL: CPT | Performed by: INTERNAL MEDICINE

## 2022-12-09 PROCEDURE — 97530 THERAPEUTIC ACTIVITIES: CPT

## 2022-12-09 PROCEDURE — 63710000001 INSULIN DETEMIR PER 5 UNITS: Performed by: INTERNAL MEDICINE

## 2022-12-09 PROCEDURE — 02HV33Z INSERTION OF INFUSION DEVICE INTO SUPERIOR VENA CAVA, PERCUTANEOUS APPROACH: ICD-10-PCS | Performed by: INTERNAL MEDICINE

## 2022-12-09 PROCEDURE — C1751 CATH, INF, PER/CENT/MIDLINE: HCPCS

## 2022-12-09 PROCEDURE — C1894 INTRO/SHEATH, NON-LASER: HCPCS

## 2022-12-09 PROCEDURE — 82962 GLUCOSE BLOOD TEST: CPT

## 2022-12-09 PROCEDURE — 99232 SBSQ HOSP IP/OBS MODERATE 35: CPT | Performed by: NURSE PRACTITIONER

## 2022-12-09 PROCEDURE — 25010000002 VANCOMYCIN PER 500 MG

## 2022-12-09 RX ORDER — SODIUM CHLORIDE 0.9 % (FLUSH) 0.9 %
10 SYRINGE (ML) INJECTION EVERY 12 HOURS SCHEDULED
Status: DISCONTINUED | OUTPATIENT
Start: 2022-12-09 | End: 2022-12-13 | Stop reason: HOSPADM

## 2022-12-09 RX ORDER — SODIUM CHLORIDE 0.9 % (FLUSH) 0.9 %
10 SYRINGE (ML) INJECTION AS NEEDED
Status: DISCONTINUED | OUTPATIENT
Start: 2022-12-09 | End: 2022-12-13 | Stop reason: HOSPADM

## 2022-12-09 RX ADMIN — APIXABAN 2.5 MG: 2.5 TABLET, FILM COATED ORAL at 10:37

## 2022-12-09 RX ADMIN — DOCUSATE SODIUM 50 MG AND SENNOSIDES 8.6 MG 2 TABLET: 8.6; 5 TABLET, FILM COATED ORAL at 21:55

## 2022-12-09 RX ADMIN — CEFTRIAXONE 2 G: 2 INJECTION, POWDER, FOR SOLUTION INTRAMUSCULAR; INTRAVENOUS at 19:55

## 2022-12-09 RX ADMIN — Medication 10 ML: at 13:58

## 2022-12-09 RX ADMIN — LISINOPRIL 40 MG: 40 TABLET ORAL at 21:55

## 2022-12-09 RX ADMIN — QUETIAPINE FUMARATE 12.5 MG: 25 TABLET ORAL at 10:37

## 2022-12-09 RX ADMIN — AMLODIPINE BESYLATE 10 MG: 10 TABLET ORAL at 10:37

## 2022-12-09 RX ADMIN — INSULIN DETEMIR 15 UNITS: 100 INJECTION, SOLUTION SUBCUTANEOUS at 21:54

## 2022-12-09 RX ADMIN — OLANZAPINE 5 MG: 5 TABLET, FILM COATED ORAL at 10:37

## 2022-12-09 RX ADMIN — HYDRALAZINE HYDROCHLORIDE 25 MG: 25 TABLET, FILM COATED ORAL at 13:54

## 2022-12-09 RX ADMIN — LORAZEPAM 2 MG: 1 TABLET ORAL at 15:00

## 2022-12-09 RX ADMIN — OLANZAPINE 5 MG: 5 TABLET, FILM COATED ORAL at 21:55

## 2022-12-09 RX ADMIN — HYDRALAZINE HYDROCHLORIDE 25 MG: 25 TABLET, FILM COATED ORAL at 21:55

## 2022-12-09 RX ADMIN — Medication 5 MG: at 21:55

## 2022-12-09 RX ADMIN — HYDRALAZINE HYDROCHLORIDE 25 MG: 25 TABLET, FILM COATED ORAL at 06:29

## 2022-12-09 RX ADMIN — INSULIN LISPRO 2 UNITS: 100 INJECTION, SOLUTION INTRAVENOUS; SUBCUTANEOUS at 18:03

## 2022-12-09 RX ADMIN — VANCOMYCIN HYDROCHLORIDE 750 MG: 750 INJECTION, SOLUTION INTRAVENOUS at 21:55

## 2022-12-09 RX ADMIN — Medication 10 ML: at 21:56

## 2022-12-09 RX ADMIN — INSULIN LISPRO 3 UNITS: 100 INJECTION, SOLUTION INTRAVENOUS; SUBCUTANEOUS at 10:00

## 2022-12-09 RX ADMIN — INSULIN LISPRO 2 UNITS: 100 INJECTION, SOLUTION INTRAVENOUS; SUBCUTANEOUS at 13:54

## 2022-12-09 RX ADMIN — Medication 10 ML: at 10:36

## 2022-12-09 RX ADMIN — APIXABAN 2.5 MG: 2.5 TABLET, FILM COATED ORAL at 21:55

## 2022-12-09 NOTE — PROGRESS NOTES
[facility] Medicine Services  PROGRESS NOTE    Patient Name: Omkar Nava  : 1955  MRN: [id number]    Date of Admission: 2022  Primary Care Physician: Provider, No Known    Subjective   Subjective     CC:  F/U TMA L foot, osteomyelitis    HPI:  Awake, resting comfortably in chair with feet elevated.  Family at bedside.  Patient confused but has no significant complaints.  Long discussion with patient's sister about the need for PICC line placement today for long-term antibiotics at the nursing facility.    ROS:  UTO due to mental status    Objective   Objective     Vital Signs:   Temp:  [98.6 °F (37 °C)-99.4 °F (37.4 °C)] 98.6 °F (37 °C)  Heart Rate:  [67-79] 68  Resp:  [18] 18  BP: (128-173)/(69-77) 148/77  Flow (L/min):  [2] 2     Physical Exam:  Constitutional: No acute distress, awake, alert, upright in chair with feet elevated, family at bedside  HENT: NCAT, mucous membranes moist  Respiratory: Clear to auscultation bilaterally, respiratory effort normal on room air  Cardiovascular: RRR, no murmurs, rubs, or gallops  Gastrointestinal: Positive bowel sounds, soft, nontender, nondistended  Musculoskeletal: L LE with splint in place-intact with no notable drainage  Psychiatric: Flat affect but appropriate  Neurologic: Oriented to person, speech clear, moves all extremities  Skin: No rashes noted to exposed skin      Results Reviewed:  LAB RESULTS:      Lab 22  0613 22  0811 22  0503 22  1138   WBC 8.04 8.12 11.88* 8.63   HEMOGLOBIN 7.4* 7.7* 8.2* 9.8*   HEMATOCRIT 23.2* 23.9* 25.0* 30.4*   PLATELETS 272 248 287 333   NEUTROS ABS 5.39 5.21  --   --    IMMATURE GRANS (ABS) 0.02 0.02  --   --    LYMPHS ABS 1.66 1.95  --   --    MONOS ABS 0.70 0.67  --   --    EOS ABS 0.25 0.24  --   --    MCV 86.6 87.5 86.2 86.6   CRP  --  0.95*  --   --          Lab 22  0614 22  0811 22  0701 22  1138   SODIUM 138 143 139 141   POTASSIUM 4.3  4.2 4.4 4.3   CHLORIDE 104 110* 103 105   CO2 28.0 28.0 26.0 28.0   ANION GAP 6.0 5.0 10.0 8.0   BUN 18 15 20 22   CREATININE 0.89 0.95 1.03 1.03   EGFR 95.1 88.8 80.6 80.6   GLUCOSE 237* 112* 273* 167*   CALCIUM 8.7 8.4* 8.9 9.8         Lab 12/09/22  0614 12/07/22  0811   TOTAL PROTEIN 5.6* 5.6*   ALBUMIN 2.80* 2.80*   GLOBULIN 2.8 2.8   ALT (SGPT) 12 12   AST (SGOT) 13 13   BILIRUBIN <0.2 <0.2   ALK PHOS 92 73                     Brief Urine Lab Results  (Last result in the past 365 days)      Color   Clarity   Blood   Leuk Est   Nitrite   Protein   CREAT   Urine HCG        06/12/22 2056 Yellow   Clear     Negative                     Microbiology Results Abnormal     Procedure Component Value - Date/Time    Anaerobic Culture - Tissue, Toe, Left [[id number]]  (Normal) Collected: 12/05/22 1837    Lab Status: Preliminary result Specimen: Tissue from Toe, Left Updated: 12/08/22 0738     Anaerobic Culture No anaerobes isolated at 3 days    AFB Culture - Tissue, Toe, Left [[id number]] Collected: 12/05/22 1837    Lab Status: Preliminary result Specimen: Tissue from Toe, Left Updated: 12/06/22 1328     AFB Stain No acid fast bacilli seen on concentrated smear          No radiology results from the last 24 hrs        I have reviewed the medications:  Scheduled Meds:[START ON 12/12/2022] Pharmacy Consult, , Does not apply, Once  amLODIPine, 10 mg, Oral, Q24H  apixaban, 2.5 mg, Oral, BID  cefTRIAXone, 2 g, Intravenous, Q24H  hydrALAZINE, 25 mg, Oral, Q8H  insulin detemir, 15 Units, Subcutaneous, Nightly  insulin lispro, 0-7 Units, Subcutaneous, TID AC  lisinopril, 40 mg, Oral, Daily  melatonin, 5 mg, Oral, Nightly  OLANZapine, 5 mg, Oral, BID  QUEtiapine, 12.5 mg, Oral, Daily  senna-docusate sodium, 2 tablet, Oral, Nightly  sodium chloride, 10 mL, Intravenous, Q12H  vancomycin, 750 mg, Intravenous, Q12H      Continuous Infusions:lactated ringers, 9 mL/hr, Last Rate: Stopped (12/08/22 1603)  Pharmacy to dose vancomycin,        PRN Meds:.•  acetaminophen **OR** acetaminophen  •  bisacodyl  •  dextrose  •  dextrose  •  glucagon (human recombinant)  •  haloperidol  •  HYDROmorphone **AND** naloxone  •  LORazepam  •  melatonin  •  ondansetron **OR** ondansetron  •  oxyCODONE  •  Pharmacy to dose vancomycin  •  polyethylene glycol  •  sodium chloride  •  sodium chloride    Assessment & Plan   Assessment & Plan     Active Hospital Problems    Diagnosis  POA   • **Acute osteomyelitis of left foot (HCC) [M86.172]  Yes   • Neurocognitive disorder [R41.9]  Yes   • Type 2 diabetes mellitus (HCC) [E11.9]  Yes   • Essential hypertension [I10]  Yes      Resolved Hospital Problems   No resolved problems to display.        Brief Hospital Course to date:  Omkar Nava is a 67 y.o. male with a history of dementia, T2DM, HTN, and polysubstance abuse who was admitted here on 12/5 for planned transmetatarsal amputation of the left foot due to osteomyelitis    This patient's problems and plans were partially entered by my partner and updated as appropriate by me 12/09/22.  All problems are new to me today     Osteomyelitis of left foot  S/p Left Transmetatarsal amputation  -S/p transmetatarsal amputation of left foot on 12/5 by orthopedics, Dr Petty  -ID following, continue antibiotics: Vancomycin and Cefepime until 1/18/2023. Patient to follow-up with Dr Gordon 2 weeks post discharge.  -Discussed with Dr. Gordon today.  The patient will require a PICC line prior to discharge due to the need for long-term antibiotics at the nursing facility.  Order has been placed.  I discussed this with the patient's family.  They are concerned, as he has had a PICC line in the past and pulled it out.  We discussed the fact that the antibiotics he is currently on have no other alternative and must be given IV.  They are agreeable with plan for PICC line today.  -Pain control  -PT/OT, plan is back to long-term care     HTN  -Continue current mediations       T2DM  -continue levemir and SSI     Neurocognitive disorder, psychosis  -prn Haldol and Ativan for agitation    Chronic Anemia  -Monitor H&H intermittently     Expected Discharge Location and Transportation: Back to long-term care via transportation once PICC placed and when okay with facility.  Expected Discharge Date: 12/10  DVT prophylaxis:  Medical and mechanical DVT prophylaxis orders are present.     AM-PAC 6 Clicks Score (PT): 8 (12/09/22 0807)    CODE STATUS:   There are no questions and answers to display.       Jory Rocha, NARGIS  12/09/22

## 2022-12-09 NOTE — THERAPY TREATMENT NOTE
Patient Name: Omkar Nava  : 1955    MRN: [id number]                              Today's Date: 2022       Admit Date: 2022    Visit Dx:     ICD-10-CM ICD-9-CM   1. 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, without long-term current use of insulin   • Orthostatic hypotension   • Acute osteomyelitis of left foot (HCC)   • Type 2 diabetes mellitus (HCC)   • Essential hypertension   • Psychotic disorder (HCC)   • Neurocognitive disorder     Past Medical History:   Diagnosis Date   • Anemia    • Dementia (HCC)    • Diabetes mellitus (HCC)    • Dysphagia    • GERD (gastroesophageal reflux disease)    • History of alcohol abuse    • History of cocaine use    • History of marijuana use    • Hypertension    • Osteomyelitis (HCC)    • Poor historian     records obtained from nursing home records & his family   • Visual impairment      Past Surgical History:   Procedure Laterality Date   • AMPUTATION FOOT Left 10/18/2022    Procedure: PARTIAL FIRST RAY AMPUTATION LEFT;  Surgeon: Yeison Petty MD;  Location:  [facility] OR;  Service: Orthopedics;  Laterality: Left;   • AMPUTATION FOOT Left 2022    Procedure: Transmetatarsal of Left Foot;  Surgeon: Yeison Petty MD;  Location:  [facility];  Service: Orthopedics;  Laterality: Left;   • EYE SURGERY        General Information     Row Name 22 0934          OT Time and Intention    Document Type therapy note (daily note)  -     Mode of Treatment occupational therapy  -     Row Name 22          General Information    Patient Profile Reviewed yes  -     Existing Precautions/Restrictions fall;non-weight bearing;left  NWB LLE, blind  -     Barriers to Rehab medically complex;previous functional deficit;cognitive status;visual  deficit  -     Row Name 12/09/22 0934          Cognition    Orientation Status (Cognition) oriented to;person;disoriented to;place;situation;time;verbal cues/prompts needed for orientation  -     Row Name 12/09/22 0934          Safety Issues, Functional Mobility    Safety Issues Affecting Function (Mobility) ability to follow commands;awareness of need for assistance;insight into deficits/self-awareness;judgment;problem-solving;safety precaution awareness;safety precautions follow-through/compliance;sequencing abilities  -     Impairments Affecting Function (Mobility) cognition;endurance/activity tolerance;coordination;strength;visual/perceptual;balance;postural/trunk control  -     Cognitive Impairments, Mobility Safety/Performance attention;awareness, need for assistance;insight into deficits/self-awareness;judgment;problem-solving/reasoning;safety precaution awareness;safety precaution follow-through;sequencing abilities  -           User Key  (r) = Recorded By, (t) = Taken By, (c) = Cosigned By    Initials Name Provider Type     Rosemarie Castillo OT Occupational Therapist                 Mobility/ADL's     Row Name 12/09/22 0935          Bed Mobility    Bed Mobility supine-sit  -     Supine-Sit [place] (Bed Mobility) maximum assist (25% patient effort);2 person assist;verbal cues  -     Bed Mobility, Safety Issues cognitive deficits limit understanding;decreased use of arms for pushing/pulling;decreased use of legs for bridging/pushing;impaired trunk control for bed mobility  -     Assistive Device (Bed Mobility) bed rails;draw sheet;head of bed elevated  -     Comment, (Bed Mobility) Initially upon sitting EOB pt demo'd posterior lean req min-mod A to maintain static sitting balance, progressed to CGA. Once sitting EOB pt declined attempting to stand and pivot to chair, also declined returning to supine. Extended time req to explain to pt that sitting EOB was not a safe position and  "his LE's needed to be elevated, pt repeatedly stating \"I'm sitting up\" and not following commands.  -     Row Name 12/09/22 0935          Transfers    Transfers sit-stand transfer;stand-sit transfer  -     Row Name 12/09/22 0935          Sit-Stand Transfer    Sit-Stand [place] (Transfers) maximum assist (25% patient effort);2 person assist;verbal cues  -     Assistive Device (Sit-Stand Transfers) other (see comments)  -     Comment, (Sit-Stand Transfer) Pt agreeable to attempt STS, however, once initiated pt req max Ax2 w/ BUE support, unable to clear hips from EOB and pt resisting movement. NWB maintained on LLE throughout. Pt left sitting EOB w/ PT.  -     Row Name 12/09/22 0935          Stand-Sit Transfer    Stand-Sit [place] (Transfers) maximum assist (25% patient effort);2 person assist;verbal cues  -     Assistive Device (Stand-Sit Transfers) other (see comments)  -     Row Name 12/09/22 0935          Mobility    Extremity Weight-bearing Status left lower extremity   -     Left Lower Extremity (Weight-bearing Status) non weight-bearing (NWB)   -           User Key  (r) = Recorded By, (t) = Taken By, (c) = Cosigned By    Initials Name Provider Type     Rosemarie Castillo OT Occupational Therapist               Obj/Interventions     Row Name 12/09/22 0940          Balance    Balance Assessment sitting static balance;sitting dynamic balance;sit to stand dynamic balance  -     Static Sitting Balance minimal assist;1-person assist;verbal cues  -     Dynamic Sitting Balance contact guard;verbal cues  -     Position, Sitting Balance unsupported;sitting edge of bed  -     Sit to Stand Dynamic Balance maximum assist;2-person assist;verbal cues  unable to clear pt's hips from bed  -     Position/Device Used, Standing Balance supported  -     Balance Interventions sitting;sit to stand;occupation based/functional task  -           User Key  (r) = Recorded By, (t) = Taken By, " (c) = Cosigned By    Initials Name Provider Type    Rosemarie Khan, OT Occupational Therapist               Goals/Plan    No documentation.                Clinical Impression     Row Name 12/09/22 0941          Pain Assessment    Pretreatment Pain Rating 0/10 - no pain  -     Posttreatment Pain Rating 0/10 - no pain  -     Row Name 12/09/22 0941          Plan of Care Review    Plan of Care Reviewed With patient  -     Progress no change  -     Outcome Evaluation Pt presents w/ impaired cognition, decreased command following, generalized weakness, and balance deficits limiting his ADL independence. Pt max Ax2 for supine-to-sit & attempt for STS w/ BUE support, however, unable to clear pt's hips from bed. Will continue to progress pt as tolerated per OT POC. Rec SNF at d/c.  -     Row Name 12/09/22 0941          Therapy Assessment/Plan (OT)    Rehab Potential (OT) fair, will monitor progress closely  -     Criteria for Skilled Therapeutic Interventions Met (OT) yes;meets criteria;skilled treatment is necessary  -     Therapy Frequency (OT) daily  -     Row Name 12/09/22 0941          Therapy Plan Review/Discharge Plan (OT)    Anticipated Discharge Disposition (OT) skilled nursing facility  -     Row Name 12/09/22 0941          Vital Signs    Pre Systolic BP Rehab --  No tele, RN cleared OT  -     O2 Delivery Pre Treatment room air  -     O2 Delivery Intra Treatment room air  -     O2 Delivery Post Treatment room air  -     Pre Patient Position Supine  -     Intra Patient Position Standing  -     Post Patient Position Sitting  -     Row Name 12/09/22 0941          Positioning and Restraints    Pre-Treatment Position in bed  -     Post Treatment Position bed  -     In Bed sitting EOB;with PT  -           User Key  (r) = Recorded By, (t) = Taken By, (c) = Cosigned By    Initials Name Provider Type    Rosemarie Khan, OT Occupational Therapist               Outcome  Measures     Row Name 12/09/22 0942          How much help from another is currently needed...    Putting on and taking off regular lower body clothing? 1  -MC     Bathing (including washing, rinsing, and drying) 1  -MC     Toileting (which includes using toilet bed pan or urinal) 1  -MC     Putting on and taking off regular upper body clothing 2  -MC     Taking care of personal grooming (such as brushing teeth) 2  -MC     Eating meals 1  -     AM-PAC 6 Clicks Score (OT) 8  -     Row Name 12/09/22 0942          Functional Assessment    Outcome Measure Options AM-PAC 6 Clicks Daily Activity (OT)  -           User Key  (r) = Recorded By, (t) = Taken By, (c) = Cosigned By    Initials Name Provider Type    Rosemarie Khan OT Occupational Therapist                Occupational Therapy Education     Title: PT OT SLP Therapies (In Progress)     Topic: Occupational Therapy (In Progress)     Point: ADL training (In Progress)     Description:   Instruct learner(s) on proper safety adaptation and remediation techniques during self care or transfers.   Instruct in proper use of assistive devices.              Learning Progress Summary           Patient Acceptance, E, NR by  at 12/9/2022 0943    Acceptance, E,TB, VU by KF at 12/8/2022 1033    Acceptance, E, VU,NR by CHRISTI at 12/7/2022 1312    Comment: reason for consult, precautions LLE, bed mobility, transfers and ADL sequencing and safety, UE TE   Family Acceptance, E, VU,NR by CHRISTI at 12/7/2022 1312    Comment: reason for consult, precautions LLE, bed mobility, transfers and ADL sequencing and safety, UE TE                   Point: Home exercise program (Done)     Description:   Instruct learner(s) on appropriate technique for monitoring, assisting and/or progressing therapeutic exercises/activities.              Learning Progress Summary           Patient Acceptance, E,TB, VU by KF at 12/8/2022 1033    Acceptance, E, VU,NR by CHRISTI at 12/7/2022 1312    Comment: reason  for consult, precautions LLE, bed mobility, transfers and ADL sequencing and safety, UE TE   Family Acceptance, E, VU,NR by  at 12/7/2022 1312    Comment: reason for consult, precautions LLE, bed mobility, transfers and ADL sequencing and safety, UE TE                   Point: Precautions (In Progress)     Description:   Instruct learner(s) on prescribed precautions during self-care and functional transfers.              Learning Progress Summary           Patient Acceptance, E, NR by  at 12/9/2022 0943    Acceptance, E,TB, VU by  at 12/8/2022 1033    Acceptance, E, VU,NR by CHRISTI at 12/7/2022 1312    Comment: reason for consult, precautions LLE, bed mobility, transfers and ADL sequencing and safety, UE TE   Family Acceptance, E, VU,NR by  at 12/7/2022 1312    Comment: reason for consult, precautions LLE, bed mobility, transfers and ADL sequencing and safety, UE TE                   Point: Body mechanics (In Progress)     Description:   Instruct learner(s) on proper positioning and spine alignment during self-care, functional mobility activities and/or exercises.              Learning Progress Summary           Patient Acceptance, E, NR by  at 12/9/2022 0943    Acceptance, E,TB, VU by  at 12/8/2022 1033    Acceptance, E, VU,NR by  at 12/7/2022 1312    Comment: reason for consult, precautions LLE, bed mobility, transfers and ADL sequencing and safety, UE TE   Family Acceptance, E, VU,NR by  at 12/7/2022 1312    Comment: reason for consult, precautions LLE, bed mobility, transfers and ADL sequencing and safety, UE TE                               User Key     Initials Effective Dates Name Provider Type Discipline     06/16/21 -  Steffi Carpenter, OT Occupational Therapist OT     10/14/22 -  Rosemarie Castillo OT Occupational Therapist OT     06/24/22 -  Justina Clark RN Registered Nurse Nurse              OT Recommendation and Plan  Therapy Frequency (OT): daily  Plan of Care Review  Plan of Care  Reviewed With: patient  Progress: no change  Outcome Evaluation: Pt presents w/ impaired cognition, decreased command following, generalized weakness, and balance deficits limiting his ADL independence. Pt max Ax2 for supine-to-sit & attempt for STS w/ BUE support, however, unable to clear pt's hips from bed. Will continue to progress pt as tolerated per OT POC. Rec SNF at d/c.     Time Calculation:    Time Calculation- OT     Row Name 12/09/22 0943             Time Calculation- OT    OT Start Time 0816  -      OT Received On 12/09/22  -      OT Goal Re-Cert Due Date 12/17/22  -         Timed Charges    55117 - OT Therapeutic Activity Minutes 23  -MC         Total Minutes    Timed Charges Total Minutes 23  -       Total Minutes 23  -            User Key  (r) = Recorded By, (t) = Taken By, (c) = Cosigned By    Initials Name Provider Type     Rosemarie Castillo OT Occupational Therapist              Therapy Charges for Today     Code Description Service Date Service Provider Modifiers Qty    [id number]  OT THERAPEUTIC ACT EA 15 MIN 12/9/2022 Rosemarie Castillo OT GO 2    [id number]  OT THER SUPP EA 15 MIN 12/9/2022 Rosemarie Castillo OT GO 2               Rosemarie Castillo OT  12/9/2022

## 2022-12-09 NOTE — THERAPY TREATMENT NOTE
Patient Name: Omkar Nava  : 1955    MRN: [id number]                              Today's Date: 2022       Admit Date: 2022    Visit Dx:     ICD-10-CM ICD-9-CM   1. 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, without long-term current use of insulin   • Orthostatic hypotension   • Acute osteomyelitis of left foot (HCC)   • Type 2 diabetes mellitus (HCC)   • Essential hypertension   • Psychotic disorder (HCC)   • Neurocognitive disorder     Past Medical History:   Diagnosis Date   • Anemia    • Dementia (HCC)    • Diabetes mellitus (HCC)    • Dysphagia    • GERD (gastroesophageal reflux disease)    • History of alcohol abuse    • History of cocaine use    • History of marijuana use    • Hypertension    • Osteomyelitis (HCC)    • Poor historian     records obtained from nursing home records & his family   • Visual impairment      Past Surgical History:   Procedure Laterality Date   • AMPUTATION FOOT Left 10/18/2022    Procedure: PARTIAL FIRST RAY AMPUTATION LEFT;  Surgeon: Yeison Petty MD;  Location:  "[facility]" OR;  Service: Orthopedics;  Laterality: Left;   • AMPUTATION FOOT Left 2022    Procedure: Transmetatarsal of Left Foot;  Surgeon: Yeison Petty MD;  Location:  [facility] OR;  Service: Orthopedics;  Laterality: Left;   • EYE SURGERY        General Information     Row Name 22          Physical Therapy Time and Intention    Document Type therapy note (daily note)  -NS     Mode of Treatment physical therapy  -NS     Row Name 22          General Information    Patient Profile Reviewed yes  -NS     Existing Precautions/Restrictions fall;non-weight bearing;left  NWB LLE; visual deficits  -NS     Row Name 22          Cognition    Orientation Status  (Cognition) oriented to;person;disoriented to;place;situation;time  -NS     Row Name 12/09/22 0911          Safety Issues, Functional Mobility    Safety Issues Affecting Function (Mobility) ability to follow commands;insight into deficits/self-awareness;judgment;problem-solving;safety precaution awareness;safety precautions follow-through/compliance;sequencing abilities;steps too close to assistive device;unable to maintain weight-bearing restrictions  -NS     Impairments Affecting Function (Mobility) balance;coordination;cognition;motor planning;visual/perceptual;strength;endurance/activity tolerance;postural/trunk control  -NS     Cognitive Impairments, Mobility Safety/Performance problem-solving/reasoning;safety precaution awareness;safety precaution follow-through;sequencing abilities;insight into deficits/self-awareness;judgment;attention  -NS     Comment, Safety Issues/Impairments (Mobility) Increased processing time.  -NS           User Key  (r) = Recorded By, (t) = Taken By, (c) = Cosigned By    Initials Name Provider Type    Maggi Weiss PT Physical Therapist               Mobility     Row Name 12/09/22 0911          Bed Mobility    Comment, (Bed Mobility) Sitting EOB on arrival  -[facility] Name 12/09/22 0911          Transfers    Comment, (Transfers) VCs for hand and foot placement. Pt had difficulty following directions for hand placement. PT's foor placed underneath pt's to monitor WBing status. Attempted STS with pt unable to clear hips and unable to maintain WBing restrictions. Lift to chair for safety.  -NS     Row Name 12/09/22 0911          Bed-Chair Transfer    Bed-Chair [place] (Transfers) dependent (less than 25% patient effort);2 person assist  -NS     Assistive Device (Bed-Chair Transfers) lift device  -NS     Row Name 12/09/22 0911          Sit-Stand Transfer    Sit-Stand [place] (Transfers) dependent (less than 25% patient effort);2 person assist;verbal cues  -NS     Assistive  Device (Sit-Stand Transfers) other (see comments)  BUE support  -NS     Row Name 12/09/22 0911          Mobility    Extremity Weight-bearing Status left lower extremity  -NS     Left Lower Extremity (Weight-bearing Status) non weight-bearing (NWB)  -NS           User Key  (r) = Recorded By, (t) = Taken By, (c) = Cosigned By    Initials Name Provider Type    Maggi Weiss PT Physical Therapist               Obj/Interventions     Row Name 12/09/22 0839          Balance    Balance Assessment sitting static balance;sitting dynamic balance  -NS     Static Sitting Balance standby assist  -NS     Dynamic Sitting Balance contact guard  -NS     Position, Sitting Balance unsupported;sitting edge of bed  -NS     Comment, Balance Improvement in static sitting balance.  -NS           User Key  (r) = Recorded By, (t) = Taken By, (c) = Cosigned By    Initials Name Provider Type    Maggi Weiss PT Physical Therapist               Goals/Plan    No documentation.                Clinical Impression     Row Name 12/09/22 0839          Pain    Pretreatment Pain Rating 0/10 - no pain  -NS     Posttreatment Pain Rating 0/10 - no pain  -NS     Row Name 12/09/22 0839          Plan of Care Review    Plan of Care Reviewed With patient  -NS     Progress no change  -NS     Outcome Evaluation Patient continues to be limited by weakness, decreased activity tolerance, and cognitive impairments. Pt had some difficulty following directions, attempted STS by but unable to offweight hips or maintain WBing status. He demonstrated improvement in sitting balance. Will continue to progress as tolerated. Recommend SNF at d/c.  -NS     Row Name 12/09/22 0839          Vital Signs    Pre Systolic BP Rehab --  no tele- RN cleared for PT  -NS     Pre Patient Position Sitting  -NS     Intra Patient Position Sitting  -NS     Post Patient Position Sitting  -NS     Row Name 12/09/22 0839          Positioning and Restraints    Pre-Treatment Position in bed   -NS     Post Treatment Position chair  -NS     In Chair notified nsg;reclined;call light within reach;encouraged to call for assist;exit alarm on;waffle cushion;on mechanical lift sling;heels elevated;legs elevated  -NS           User Key  (r) = Recorded By, (t) = Taken By, (c) = Cosigned By    Initials Name Provider Type    Maggi Weiss, PT Physical Therapist               Outcome Measures     Row Name 12/09/22 0839          How much help from another person do you currently need...    Turning from your back to your side while in flat bed without using bedrails? 2  -NS     Moving from lying on back to sitting on the side of a flat bed without bedrails? 2  -NS     Moving to and from a bed to a chair (including a wheelchair)? 1  -NS     Standing up from a chair using your arms (e.g., wheelchair, bedside chair)? 1  -NS     Climbing 3-5 steps with a railing? 1  -NS     To walk in hospital room? 1  -NS     AM-PAC 6 Clicks Score (PT) 8  -NS     Highest level of mobility 3 --> Sat at edge of bed  -NS     Row Name 12/09/22 0942 12/09/22 0839       Functional Assessment    Outcome Measure Options AM-PAC 6 Clicks Daily Activity (OT)  - AM-PAC 6 Clicks Basic Mobility (PT)  -NS          User Key  (r) = Recorded By, (t) = Taken By, (c) = Cosigned By    Initials Name Provider Type    Maggi Weiss, PT Physical Therapist    Rosemarie Khan OT Occupational Therapist                             Physical Therapy Education     Title: PT OT SLP Therapies (In Progress)     Topic: Physical Therapy (In Progress)     Point: Mobility training (In Progress)     Learning Progress Summary           Patient Acceptance, E, NR by NS at 12/9/2022 1014    Comment: WBing status, importance of OOB activity    Acceptance, E,D, NR by DM at 12/8/2022 1231    Acceptance, E,TB, VU by KF at 12/8/2022 1033    Acceptance, E,D, NR by DM at 12/7/2022 1414   Family Acceptance, E,D, NR by DM at 12/7/2022 1414                   Point: Home  exercise program (In Progress)     Learning Progress Summary           Patient Acceptance, E,D, NR by DM at 12/8/2022 1231    Acceptance, E,TB, VU by KF at 12/8/2022 1033    Acceptance, E,D, NR by DM at 12/7/2022 1414   Family Acceptance, E,D, NR by DM at 12/7/2022 1414                   Point: Body mechanics (In Progress)     Learning Progress Summary           Patient Acceptance, E, NR by NS at 12/9/2022 1014    Comment: WBing status, importance of OOB activity    Acceptance, E,D, NR by DM at 12/8/2022 1231    Acceptance, E,TB, VU by KF at 12/8/2022 1033    Acceptance, E,D, NR by DM at 12/7/2022 1414   Family Acceptance, E,D, NR by DM at 12/7/2022 1414                   Point: Precautions (In Progress)     Learning Progress Summary           Patient Acceptance, E, NR by NS at 12/9/2022 1014    Comment: WBing status, importance of OOB activity    Acceptance, E,D, NR by DM at 12/8/2022 1231    Acceptance, E,TB, VU by KF at 12/8/2022 1033    Acceptance, E,D, NR by DM at 12/7/2022 1414   Family Acceptance, E,D, NR by DM at 12/7/2022 1414                               User Key     Initials Effective Dates Name Provider Type Discipline     06/16/21 -  Demetra Matta PT Physical Therapist PT    NS 06/16/21 -  Maggi Jade PT Physical Therapist PT     06/24/22 -  Justina Clark, COREEN Registered Nurse Nurse              PT Recommendation and Plan     Plan of Care Reviewed With: patient  Progress: no change  Outcome Evaluation: Patient continues to be limited by weakness, decreased activity tolerance, and cognitive impairments. Pt had some difficulty following directions, attempted STS by but unable to offweight hips or maintain WBing status. He demonstrated improvement in sitting balance. Will continue to progress as tolerated. Recommend SNF at d/c.     Time Calculation:    PT Charges     Row Name 12/09/22 0876             Time Calculation    Start Time 0839  -NS      PT Received On 12/09/22  -NS      PT Goal Re-Cert  Due Date 12/17/22  -NS         Timed Charges    03446 - PT Therapeutic Activity Minutes 14  -NS         Total Minutes    Timed Charges Total Minutes 14  -NS       Total Minutes 14  -NS            User Key  (r) = Recorded By, (t) = Taken By, (c) = Cosigned By    Initials Name Provider Type    Maggi Weiss, KYRA Physical Therapist              Therapy Charges for Today     Code Description Service Date Service Provider Modifiers Qty    [id number] HC PT THERAPEUTIC ACT EA 15 MIN 12/9/2022 Maggi Jade, PT GP 1          PT G-Codes  Outcome Measure Options: AM-PAC 6 Clicks Daily Activity (OT)  AM-PAC 6 Clicks Score (PT): 8  AM-PAC 6 Clicks Score (OT): 8  PT Discharge Summary  Anticipated Discharge Disposition (PT): skilled nursing facility    Maggi Jade PT  12/9/2022

## 2022-12-09 NOTE — PLAN OF CARE
Goal Outcome Evaluation:  Plan of Care Reviewed With: patient        Progress: no change  Outcome Evaluation: Pt presents w/ impaired cognition, decreased command following, generalized weakness, and balance deficits limiting his ADL independence. Pt max Ax2 for supine-to-sit & attempt for STS w/ BUE support, however, unable to clear pt's hips from bed. Will continue to progress pt as tolerated per OT POC. Rec SNF at d/c.

## 2022-12-09 NOTE — PROGRESS NOTES
Pharmacy Consult-Vancomycin Dosing  Omkar Nava is a  67 y.o. male receiving vancomycin therapy.     Indication: osteomyelitis  Consulting Provider: Dr Gordon  ID Consult: yes    Goal AUC: 400 - 600 mg/L*hr    Current Antimicrobial Therapy  Anti-Infectives (From admission, onward)      Ordered     Dose/Rate Route Frequency Start Stop    12/08/22 1327  cefTRIAXone (ROCEPHIN) 2 g/100 mL 0.9% NS IVPB (MBP)        Ordering Provider: Rayray Gordon MD    2 g  over 30 Minutes Intravenous Every 24 Hours 12/08/22 2000 01/07/23 1959    12/06/22 1108  vancomycin (VANCOCIN) 1000 mg/200 mL dextrose 5% IVPB        Ordering Provider: Ijeoma Kuhn, PharmD    1,000 mg  over 60 Minutes Intravenous Every 12 Hours Scheduled 12/06/22 2200 01/17/23 2159    12/06/22 1020  cefepime (MAXIPIME) 2 g/100 mL 0.9% NS (mbp)        Ordering Provider: Ijeoma Kuhn PharmD    2 g  200 mL/hr over 30 Minutes Intravenous Once 12/06/22 1115 12/06/22 1307    12/06/22 1007  vancomycin 1750 mg/500 mL 0.9% NS IVPB ([facility])        Ordering Provider: Ijeoma Kuhn PharmD    20 mg/kg × 81.6 kg  over 120 Minutes Intravenous Once 12/06/22 1100 12/06/22 1256    12/06/22 1022  Pharmacy to dose vancomycin        Ordering Provider: Rayray Gordon MD     Does not apply Continuous PRN 12/06/22 1021 01/17/23 1020    12/05/22 2055  ceFAZolin in dextrose (ANCEF) IVPB solution 2 g        Ordering Provider: Yeison Petty MD    2 g  over 30 Minutes Intravenous Every 8 Hours 12/06/22 0000 12/06/22 0744    12/05/22 1058  vancomycin 1250 mg/250 mL 0.9% NS IVPB ([facility])        Ordering Provider: Yeison Petty MD    15 mg/kg × 89.9 kg Intravenous Once 12/05/22 1100 12/05/22 1651          Allergies  Allergies as of 11/30/2022   • (No Known Allergies)     Labs  Results from last 7 days   Lab Units 12/09/22  0614 12/07/22  0811 12/06/22  0701   BUN mg/dL 18 15 20   CREATININE mg/dL 0.89 0.95 1.03     Results from last 7 days   Lab Units 12/09/22  0613  "12/07/22  0811 12/06/22  0503   WBC 10*3/mm3 8.04 8.12 11.88*     Evaluation of Dosing   Last Dose Received in the ED/Outside Facility: vancomycin 1250 mg IV x1 on 12/5 preop  Is Patient on Dialysis or Renal Replacement: no    Ht - 182.9 cm (72\")  Wt - 81.6 kg (180 lb)    Estimated Creatinine Clearance: 95.5 mL/min (by C-G formula based on SCr of 0.89 mg/dL).    Intake & Output (last 3 days)         12/06 0701  12/07 0700 12/07 0701  12/08 0700 12/08 0701  12/09 0700 12/09 0701  12/10 0700    P.O.      I.V. (mL/kg)        Total Intake(mL/kg) 600 (7.4) 830 (10.2) 1680 (20.6)     Urine (mL/kg/hr) 350 (0.2) 1300 (0.7) 1950 (1)     Total Output 350 1300 1950     Net +250 -470 -270             Urine Unmeasured Occurrence  1 x 2 x           Microbiology and Radiology  Microbiology Results (last 10 days)       Procedure Component Value - Date/Time    Anaerobic Culture - Tissue, Toe, Left [[id number]]  (Normal) Collected: 12/05/22 1837    Lab Status: Preliminary result Specimen: Tissue from Toe, Left Updated: 12/08/22 0738     Anaerobic Culture No anaerobes isolated at 3 days    Tissue / Bone Culture - Tissue, Toe, Left [[id number]]  (Abnormal) Collected: 12/05/22 1837    Lab Status: Preliminary result Specimen: Tissue from Toe, Left Updated: 12/08/22 0700     Tissue Culture Heavy growth (4+) Kocuria kristinae     Gram Stain Moderate (3+) WBCs seen      No organisms seen    Narrative:      Kocuria are Sensitive to Doxycycline, Ceftriaxone, Cefuroxime, Amikacin, and Amoxicillin with Clavulanic Acid, but are usually Resistant to Ampicillin and Erythromycin.      AFB Culture - Tissue, Toe, Left [[id number]] Collected: 12/05/22 1837    Lab Status: Preliminary result Specimen: Tissue from Toe, Left Updated: 12/06/22 1328     AFB Stain No acid fast bacilli seen on concentrated smear          Reported Vancomycin Levels  Results from last 7 days   Lab Units 12/09/22  0614 12/07/22  0811   VANCOMYCIN RM mcg/mL 27.10 19.70 "      [facility] AUC Calculation:    Current AUC: 633 mg/L*hr    Predicted Steady State AUC on Current Dose: 629 mg/L*hr  _________________________________    Predicted Steady State AUC on New Dose: 483 mg/L*hr    Assessment/Plan:    1. Pharmacy to dose vancomycin for osteomyelitis. Goal  to 600 mg/L*hr  2. Received a loading dose of vancomycin 1750 mg x1 on 12/6 at 1056.  3. Currently on maintenance dose of vancomycin 1000 mg IV q12h (~ 12.3 mg/kg).  Previously contacted Rehab facility, [facility] RN, reported that he had remained on vancomycin 1 gm iv q12 through 11/25.  His vancomycin prior to dose on 11/22 was 12 per [facility].  4. Vancomycin level today is 27.1 (drawn at 0614 ~ 8.5 hrs post dose),  SCr and urine output stable.      Will hold this morning's vancomycin dose and then decrease dose to vancomycin 750 mg q12h starting tonight. Will recheck level on 12/12 to evaluate clearance and dosing needs.      Per Dr Gordon, planning 6 weeks of IV antibiotics.  Culture Dilshada tessie noted; ID aware  5. Monitor renal function, cultures and sensitivities, and clinical status, and adjust regimen as necessary.  Pharmacy will continue to follow.    Preet Xiao, PharmD  12/9/2022  08:17 EST

## 2022-12-09 NOTE — PROGRESS NOTES
INFECTIOUS DISEASE PROGRESS NOTE    Omkar Nava  1955  [id number]    Consult: 12/6/22, 10/15/22  Admit date: 12/5/2022    Requesting Provider: Omkar Seth MD  Evaluating physician:  Rayray Gordon MD  Reason for Consultation: left foot osteomyelitis, first toe, distal phalanx, reoperation with transmetatarsal amputation 12/5/2022 for poor wound healing and exposed bone  Chief Complaint: left foot pain      Subjective   History of present illness:  Patient is a  67 y.o. male with h/o T2DM, PN, HTN, normocytic anemia, and GERD who presented to [facility] ED on 10/14 for worsening left foot pain.  The patient is a poor historian and most of the information was taken from the chart.  He tripped at his nursing facility, [facility], while running the street in flip-flops and sustained a laceration of his first webspace.  He was taken to [facility] ED on 9/17/22 and found to have a small intra-articular vertical fracture through the base the the great toe proximal phalanx. Podiatry, Dr. Ruiz, saw patient and the wound with irrigated with suture placement.  The area was dressed and he was placed in a surgical shoe.  He was given Cefazolin x 1 dose and discharged back to his facility on Keflex for 7 days.  On 9/26/22, the nursing staff at increased pain, swelling, and redness around the foot and sent him to [facility] ED on that day.  He was found to have a displaced fracture of proximal phalanx of left hallux along with abscess and gas in tissues.  He was admitted to the hospital and underwent Left foot debridement and I and D on 9/27/22 by Dr. Ruiz with culture positive for MRSA (Resistant to Cefazolin, clindamycin, erm, oxacillin, tetracycline, Bactrim and sensitive to Vancomycin, Daptomycin-KB 1, and Linezolid KB 2), Corynebacterium striatum, and Enterococcus faecalis (sens to Vanc and ampicillin).  He underwent a second debridement and I and D on 9/30.  He was given  Cefazolin in ED and then changed to Daptomycin.  A PICC line was placed on 10/3 for Daptomycin infusions, but the antibiotic was too expensive for the SNF, so he was changed to oral Zyvox and Rifampin until 10/26.  The PICC line was removed on 10/4.  His blood cultures remained negative.  He was discharged back to his nursing facility on 10/4/22.      He was sent to [facility] ED on 10/14 after nursing staff noted that his left hallux appeared necrotic.  He has had no fever, chills, shortness of breath, nausea, vomiting, diarrhea, dysuria, or worsening foot pain.  On arrival, he is afebrile and hemodynamically stable.  On 10/15, his BP went up to 203/81.  Admitting labs were WBC 7200 with 69% neutrophils, ESR 67, CRP 3.07, Hgb 9.2, PCT 0.05, lactic acid 2.3, and creatinine 1.21.  A MRSA PCR was negative.  Blood and wound cultures are pending.  An MRI of the left foot on 10/15 showed wound around the left great toe with marrow signal alteration within the distal aspect of 1st MT as well as both phalanges c/w osteomyelitis and scattered foci in soft tissues that may be foci of gas without evidence of fluid collections or abscesses. An Xray of left foot showed soft tissue swelling of 1st digit with displaced fracture of medial base of 1st proximal phalanx.  He is currently on Cefepime and Vancomycin.  ID was asked by Dr. Seth on 10/15 to evaluate and manage his antibiotic therapy.  Patient has had some issues with psychosis over the past 6 months possibly related to drug and alcohol use.  This is also interfered with his acceptance of care.    The patient underwent a left first ray resection on 10/18/2022.  The patient was subsequently treated with vancomycin and ceftriaxone until 11/25/2022 for cultures which grew from his left foot with MRSA, Klebsiella, and Raoultella ornithinolytica.  His foot healed and then he developed left foot wound dehiscence with exposed bone.  He was readmitted to [facility] on  12/5/2022 and underwent a transmetatarsal amputation.  I was consulted on 12/6/2022 for further evaluation and treatment.  Patient is a poor historian.  He has no other localizing signs or symptoms of infection.    12/7/2022 history reviewed.  No high fevers or chills.  Pain controlled left foot.  Tolerating vancomycin and cefepime until 1/18/2023 or shorter.  Status post left foot transmetatarsal amputation 12/5.    12/8/2022 history reviewed.  Tolerating antibiotics.  Changing to vancomycin and ceftriaxone to continue until 1/18/2023 or shorter.  No fever.    12/9/22 hx rev.  No pain or fever on abx till 1/18/23.    Past Medical History:   Diagnosis Date   • Anemia    • Dementia (HCC)    • Diabetes mellitus (HCC)    • Dysphagia    • GERD (gastroesophageal reflux disease)    • History of alcohol abuse    • History of cocaine use    • History of marijuana use    • Hypertension    • Osteomyelitis (HCC)    • Poor historian     records obtained from nursing home records & his family   • Visual impairment    HTN  Psychotic d/o, unspecified.    Past Surgical History:   Procedure Laterality Date   • AMPUTATION FOOT Left 10/18/2022    Procedure: PARTIAL FIRST RAY AMPUTATION LEFT;  Surgeon: Yeison Petty MD;  Location:  [facility];  Service: Orthopedics;  Laterality: Left;   • AMPUTATION FOOT Left 12/5/2022    Procedure: Transmetatarsal of Left Foot;  Surgeon: Yeison Petty MD;  Location:  [facility];  Service: Orthopedics;  Laterality: Left;   • EYE SURGERY     Left foot debridement/I and D x 2 9/27/22 and 9/30/22.  Left first ray amputation 10/18/2022.    Pediatric History   Patient Parents   • Not on file     Other Topics Concern   • Not on file   Social History Narrative    Caffeine 0-1 servings per day    Patient lives at home .   Patient [facility] at [facility]  Former smoker, no alcohol, smokes marijuana.     family history includes Diabetes in his brother, brother, father, maternal grandfather, maternal  grandmother, mother, and sister; Hypertension in his brother, brother, father, and mother.    No Known Allergies      There is no immunization history on file for this patient.    Medication:    Current Facility-Administered Medications:   •  [START ON 12/12/2022] ! Vancomycin level 12/12 at AM labs. Do not give 0900 dose until pharmacy evaluates level, , Does not apply, Once, Preet Xiao, PharmD  •  acetaminophen (TYLENOL) tablet 650 mg, 650 mg, Oral, Q4H PRN **OR** acetaminophen (TYLENOL) suppository 650 mg, 650 mg, Rectal, Q4H PRN, Yeison Petty MD  •  amLODIPine (NORVASC) tablet 10 mg, 10 mg, Oral, Q24H, 10 mg at 12/08/22 0947 **AND** [DISCONTINUED] lisinopril (PRINIVIL,ZESTRIL) tablet 20 mg, 20 mg, Oral, Q24H, Yeison Petty MD, 20 mg at 12/06/22 0918  •  apixaban (ELIQUIS) tablet 2.5 mg, 2.5 mg, Oral, BID, Yeison Petty MD, 2.5 mg at 12/08/22 2139  •  bisacodyl (DULCOLAX) suppository 10 mg, 10 mg, Rectal, Daily PRN, Yeison Petty MD  •  cefTRIAXone (ROCEPHIN) 2 g/100 mL 0.9% NS IVPB (MBP), 2 g, Intravenous, Q24H, Rayray Gordon MD, 2 g at 12/08/22 2140  •  dextrose (D50W) (25 g/50 mL) IV injection 25 g, 25 g, Intravenous, Q15 Min PRN, Tea Bentley PA-C  •  dextrose (GLUTOSE) oral gel 15 g, 15 g, Oral, Q15 Min PRN, Tea Bentley PA-C  •  glucagon (human recombinant) (GLUCAGEN DIAGNOSTIC) injection 1 mg, 1 mg, Intramuscular, Q15 Min PRN, Tea Bentley PA-C  •  haloperidol (HALDOL) 2 MG/ML solution 2 mg, 2 mg, Oral, Daily PRN, Yeison Petty MD  •  hydrALAZINE (APRESOLINE) tablet 25 mg, 25 mg, Oral, Q8H, Marilyn Chopra, , 25 mg at 12/09/22 0629  •  HYDROmorphone (DILAUDID) injection 0.5 mg, 0.5 mg, Intravenous, Q2H PRN, 0.5 mg at 12/06/22 0714 **AND** naloxone (NARCAN) injection 0.1 mg, 0.1 mg, Intravenous, Q5 Min PRN, Yeison Petty MD  •  insulin detemir (LEVEMIR) injection 15 Units, 15 Units, Subcutaneous, Nightly, Diane Pickens MD, 15 Units at 12/08/22  2138  •  Insulin Lispro (humaLOG) injection 0-7 Units, 0-7 Units, Subcutaneous, TID AC, Tea Bentley PA-C, 4 Units at 12/08/22 1712  •  lactated ringers infusion, 9 mL/hr, Intravenous, Continuous, Yeison Petty MD, Stopped at 12/08/22 1603  •  lisinopril (PRINIVIL,ZESTRIL) tablet 40 mg, 40 mg, Oral, Daily, Yeison Petty MD, 40 mg at 12/08/22 2138  •  LORazepam (ATIVAN) tablet 2 mg, 2 mg, Oral, Q6H PRN, Yeison Petty MD, 2 mg at 12/08/22 1801  •  melatonin tablet 5 mg, 5 mg, Oral, Nightly, Yeison Petty MD, 5 mg at 12/08/22 2139  •  melatonin tablet 5 mg, 5 mg, Oral, Nightly PRN, Yeison Petty MD  •  OLANZapine (zyPREXA) tablet 5 mg, 5 mg, Oral, BID, Yeison Petty MD, 5 mg at 12/08/22 2139  •  ondansetron (ZOFRAN) tablet 4 mg, 4 mg, Oral, Q6H PRN **OR** ondansetron (ZOFRAN) injection 4 mg, 4 mg, Intravenous, Q6H PRN, Yeison Petty MD  •  oxyCODONE (ROXICODONE) immediate release tablet 5 mg, 5 mg, Oral, Q4H PRN, Yeison Petty MD, 5 mg at 12/07/22 1256  •  Pharmacy to dose vancomycin, , Does not apply, Continuous PRN, Rayray Gordon MD  •  polyethylene glycol (MIRALAX) packet 17 g, 17 g, Oral, PRN, Yeison Petty MD  •  QUEtiapine (SEROquel) tablet 12.5 mg, 12.5 mg, Oral, Daily, Tea Bentley PA-C, 12.5 mg at 12/08/22 0947  •  sennosides-docusate (PERICOLACE) 8.6-50 MG per tablet 2 tablet, 2 tablet, Oral, Nightly, Yeison Petty MD, 2 tablet at 12/08/22 2138  •  sodium chloride 0.9 % flush 10 mL, 10 mL, Intravenous, Q12H, Yeison Petty MD, 10 mL at 12/08/22 2139  •  sodium chloride 0.9 % flush 10 mL, 10 mL, Intravenous, PRN, Yeison Petty MD  •  sodium chloride 0.9 % infusion 40 mL, 40 mL, Intravenous, PRN, Yeison Petty MD  •  vancomycin in dextrose 5% 150 mL (VANCOCIN) IVPB 750 mg, 750 mg, Intravenous, Q12H, Preet Xiao, PharmD    Please refer to the medical record for a full medication list    Review of Systems:  Unable to get a reliable history as patient is responding  "with yes and no to basic questions and has a psychotic disorder.  Denies fever, chills, nausea, vomiting, diarrhea, shortness of breath, dysuria, or rashes.  Denies pain.    Physical Exam:   Vital Signs   Temp:  [98.6 °F (37 °C)-99.4 °F (37.4 °C)] 98.6 °F (37 °C)  Heart Rate:  [67-79] 68  Resp:  [18] 18  BP: (128-173)/(69-77) 148/77    Temp  Min: 98.6 °F (37 °C)  Max: 99.4 °F (37.4 °C)  BP  Min: 128/69  Max: 173/73  Pulse  Min: 67  Max: 79  Resp  Min: 18  Max: 18  SpO2  Min: 90 %  Max: 94 %    Blood pressure 148/77, pulse 68, temperature 98.6 °F (37 °C), resp. rate 18, height 182.9 cm (72\"), weight 81.6 kg (180 lb), SpO2 90 %.  GENERAL: Awake and alert, in moderate distress. Appears older than stated age.  Resting in bed.  Trying to eat dinner.  HEENT:  Normocephalic, atraumatic.  Oropharynx without thrush.   EYES: PERRLA.  No conjunctival injection. No icterus. EOM full.  LYMPHATICS: No lymphadenopathy of the neck or axillary or inguinal regions.   HEART: No murmur, gallop, or pericardial friction rub. Reg rate rhythm.  No JVD.  LUNGS: Clear to auscultation and percussion. No respiratory distress, no use of accessory muscles.  No rhonchi.  ABDOMEN: Soft, nontender, nondistended. No appreciable HSM.  Bowel sounds normal.  No masses.  SKIN: Warm and dry without cutaneous eruptions.   Left foot dressing in place, right arm PICC no purulence.    PSYCHIATRIC: Mental status with confusion.  But overall pleasant.  Follows commands.    EXT: Left foot surgical dressing in place.  No crepitus, or foul smell.  NEURO: Oriented to name, nonfocal.  Follows some commands and pleasant with me.  Nonfocal.    Results Review:   I reviewed the patient's new clinical results.  I reviewed the patient's new imaging results and agree with the interpretation.  I reviewed the patient's other test results and agree with the interpretation    Results from last 7 days   Lab Units 12/09/22  0613 12/07/22  0811 12/06/22  0503   WBC 10*3/mm3 8.04 " 8.12 11.88*   HEMOGLOBIN g/dL 7.4* 7.7* 8.2*   HEMATOCRIT % 23.2* 23.9* 25.0*   PLATELETS 10*3/mm3 272 248 287     Results from last 7 days   Lab Units 12/09/22  0614   SODIUM mmol/L 138   POTASSIUM mmol/L 4.3   CHLORIDE mmol/L 104   CO2 mmol/L 28.0   BUN mg/dL 18   CREATININE mg/dL 0.89   GLUCOSE mg/dL 237*   CALCIUM mg/dL 8.7     Results from last 7 days   Lab Units 12/09/22  0614   ALK PHOS U/L 92   BILIRUBIN mg/dL <0.2   ALT (SGPT) U/L 12   AST (SGOT) U/L 13         Results from last 7 days   Lab Units 12/07/22  0811   CRP mg/dL 0.95*     Results from last 7 days   Lab Units 12/09/22  0614 12/07/22  0811   VANCOMYCIN RM mcg/mL 27.10 19.70         Estimated Creatinine Clearance: 95.5 mL/min (by C-G formula based on SCr of 0.89 mg/dL).  CPK    Common Labsle 12/7/22   Creatine Kinase 231 (A)   (A) Abnormal value             Procalitonin Results:       Brief Urine Lab Results  (Last result in the past 365 days)      Color   Clarity   Blood   Leuk Est   Nitrite   Protein   CREAT   Urine HCG        06/12/22 2056 Yellow   Clear     Negative                    No results found for: SITE, ALLENTEST, PHART, JQB7CST, PO2ART, KDR1DUK, BASEEXCESS, Q3QVYYRV, HGBBG, HCTABG, OXYHEMOGLOBI, METHHGBN, CARBOXYHGB, CO2CT, BAROMETRIC, MODALITY, FIO2     Microbiology:  Microbiology Results (last 10 days)     Procedure Component Value - Date/Time    Anaerobic Culture - Tissue, Toe, Left [[id number]]  (Normal) Collected: 12/05/22 1837    Lab Status: Preliminary result Specimen: Tissue from Toe, Left Updated: 12/08/22 0738     Anaerobic Culture No anaerobes isolated at 3 days    Tissue / Bone Culture - Tissue, Toe, Left [[id number]]  (Abnormal) Collected: 12/05/22 1837    Lab Status: Preliminary result Specimen: Tissue from Toe, Left Updated: 12/08/22 0700     Tissue Culture Heavy growth (4+) Kocuria kristinae     Gram Stain Moderate (3+) WBCs seen      No organisms seen    Narrative:      Kocuria are Sensitive to Doxycycline, Ceftriaxone,  Cefuroxime, Amikacin, and Amoxicillin with Clavulanic Acid, but are usually Resistant to Ampicillin and Erythromycin.      AFB Culture - Tissue, Toe, Left [[id number]] Collected: 12/05/22 1837    Lab Status: Preliminary result Specimen: Tissue from Toe, Left Updated: 12/06/22 1328     AFB Stain No acid fast bacilli seen on concentrated smear        Blood and wound cultures 10/14 pending.       No results found for: TISSCXQ, CULTURES, CULTURE, BLOODCX, ANACX, BALCX, ACIDFASTCX, BODYFLDCX, CXREFLEX, FUNGUSCX, RESPCX, MRSACX, ROUTCX, BRCHWSHCLT, TISSUECX, URINECX, CMVCX, WOUNDCX, BCIDPCR, BFCULTURE, BLOODCULT(      Radiology:  Imaging Results (Last 72 Hours)     ** No results found for the last 72 hours. **          IMPRESSION:   1. Left hallux osteomyelitis after trauma/displaced fracture 9/17/22.  Developed gangrene and poor wound healing related to vascular disease and noncompliance.  Improved after surgery 10/18 with ray resection but then had dehiscence of wound with exposed bone.  2. 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  Cx with MRSA, Enterococcus faecalis, and Corynebacterium striatum.  Initially treated with Daptomycin and discharge on oral Zyvox and oral Rifampin until 10/26/22.  Cx with Raoultella ornithinolytica (sens to Cefepime, ceftriaxone) and Klebsiella.  Status post left first ray resection 10/18/2022.   3. Left foot wound dehiscence with exposed metatarsal new on 12/4, status post transmetatarsal amputation 12/5/2022.  Culture positive for Kocuria (new) kristinae, an uncommon pathogen.  Usually sensitive to vancomycin and cephalosporins.  4. Elevated inflammatory markers, related to above.  CRP 3.07 on 10/14.  CRP 3.10 on 10/26.  CRP 0.88 on 11/30.  5. Type 2 diabetes mellitus/peripheral neuropathy.  Affects wound healing.  6. Hyperglycemia, related to above.  7. Anemia of chronic disease.  Continues, and acute postoperative blood  loss.  8. Essential hypertension.    9. Gastroesophageal reflux disease.  10. Psychosis since April 2022 reported by the patient's sister, thought to be related to drug and alcohol use.  Was hospitalized previously at [facility].  Awaiting placement.  Has interfered with his care.  11. Leukocytosis, neutrophilic, resolved.  12. Hypocalcemia 8.4, ongoing.  13. CPK elevated 424 on 12/6, 231 on 12/7.    Plan:  1. Diagnostically, follow blood and wound cultures, imaging, physical examination, CBC, CMP, CRP, and cultures which were obtained at surgery on 12/5/2022.   2. Therapeutically, continue vancomycin and change cefepime to ceftriaxone with duration x6 weeks until 1/18/2021.   on 12/7.  3. Orthopedic surgery status post left first ray resection for osteomyelitis 10/18/2022, 12/5/2022.   4. Previously was a difficult placement.    Case management orders: Please arrange for skilled facility administration of antibiotics with vancomycin 1 g IV every 12 hours (keep trough between 12 and 18), ceftriaxone 2 g IV daily until 1/18/2023 for 6 weeks treatment of left foot osteomyelitis.  Check CBC, CMP, CRP, vancomycin trough weekly while on IV antibiotics.  Weekly PICC dressing changes.  Schedule follow-up with me in 2 weeks post discharge.    I discussed the patient's findings and my recommendations with patient and previously with his family.  He will need to be in a skilled facility to receive his antibiotics.  See next on Monday, call sooner if needed.    Our group would be pleased to follow this patient over the course of their hospitalization and assist with outpatient antimicrobial therapy, as indicated.  Side effects of medications discussed with nursing and previously with family.  Further recommendations depend on the results of the cultures and clinical course. Discussed with patient's sister and family.  Difficult issue with compliance in terms of wound care and staying off his foot.      Rayray WHITTEN  MD Evan  12/9/2022I

## 2022-12-09 NOTE — CASE MANAGEMENT/SOCIAL WORK
Continued Stay Note  [facility]     Patient Name: Omkar Nava  MRN: [id number]  Today's Date: 12/9/2022    Admit Date: 12/5/2022    Plan: Social work called [facility] and left multiple messages for the admissions office Shauna and Director of Nursing Shraddha and social work spoke with the patients daughter Idalia Lerma and she provided cell phone numbers for Shraddha and Chely at the facility. Social work called and left messages and did not get a return call. Social work called [facility] back and was told by Reva at the facility that the facility needed to complete an onsite for the patient Omkar Nava. Social work called the patients daughter who is his power of  and let her know that the facility has said they need to do an onsite assessment on Monday 12/12/2022.   Discharge Plan     Row Name 12/09/22 1250       Plan    Plan Social work called [facility] and left multiple messages for the admissions office Shauna and Director of Nursing Shraddha and social work spoke with the patients daughter Idalia Lerma and she provided cell phone numbers for Shraddha and Chely at the facility. Social work called and left messages and did not get a return call. Social work called [facility] back and was told by Reva at the facility that the facility needed to complete an onsite for the patient Omkar Nava. Social work called the patients daughter who is his power of  and let her know that the facility has said they need to do an onsite assessment on Monday 12/12/2022.    Row Name 12/09/22 5097       Plan    Plan Social work called [facility] on Friday to notify them of the IV antibotics.    Plan Comments Social work called [facility] on Friday to notify them of the IV antibotics. Social work is waiting for shauna in admissions to call back with more information about when the facility could accept Mr. Nava  and he will require a PICC. Social work will arrange transportation when all the final arrangements are made.               Discharge Codes    No documentation.               Expected Discharge Date and Time     Expected Discharge Date Expected Discharge Time    Dec 13, 2022             LUCINDA Markham

## 2022-12-09 NOTE — CASE MANAGEMENT/SOCIAL WORK
Continued Stay Note  [facility]     Patient Name: Omkar Nava  MRN: [id number]  Today's Date: 12/9/2022    Admit Date: 12/5/2022    Plan: Social work called [facility] on Friday to notify them of the IV antibotics.   Discharge Plan     Row Name 12/09/22 1047       Plan    Plan Social work called [facility] on Friday to notify them of the IV antibotics.    Plan Comments Social work called [facility] on Friday to notify them of the IV antibotics. Social work is waiting for vern in admissions to call back with more information about when the facility could accept Mr. Nava and he will require a PICC. Social work will arrange transportation when all the final arrangements are made.               Discharge Codes    No documentation.               Expected Discharge Date and Time     Expected Discharge Date Expected Discharge Time    Dec 13, 2022             LUCINDA Markham

## 2022-12-09 NOTE — CASE MANAGEMENT/SOCIAL WORK
Continued Stay Note  [facility]     Patient Name: Omkar Nava  MRN: [id number]  Today's Date: 12/9/2022    Admit Date: 12/5/2022    Plan: Social work spoke with the patients daughter Idalia Lerma and the director of nursing and Chely with administration at [facility] on the telephone. They have verified that the facility ace [facility] is coming to the hospital to do an onsite evaluation on Monday 12/12/2022. There is an ambulance set up for 12 pm Tuesday if he is able to return to the facility.   Discharge Plan     Row Name 12/09/22 1413       Plan    Plan Social work spoke with the patients daughter Idalia Lerma and the director of nursing and Chely with administration at [facility] on the telephone. They have verified that the facility [facility] is coming to the hospital to do an onsite evaluation on Monday 12/12/2022. There is an ambulance set up for 12 pm Tuesday if he is able to return to the facility.    Row Name 12/09/22 1690       Plan    Plan Social work called [facility] and left multiple messages for the admissions office Shauna and Director of Nursing Shraddha and social work spoke with the patients daughter Idalia Lerma and she provided cell phone numbers for Shraddha and Chely at the facility. Social work called and left messages and did not get a return call. Social work called [facility] back and was told by Reva at the facility that the facility needed to complete an onsite for the patient Omkar Nava. Social work called the patients daughter who is his power of  and let her know that the facility has said they need to do an onsite assessment on Monday 12/12/2022.               Discharge Codes    No documentation.               Expected Discharge Date and Time     Expected Discharge Date Expected Discharge Time    Dec 13, 2022             LUCINDA Markham

## 2022-12-09 NOTE — PROGRESS NOTES
"          Orthopaedic Surgery Progress Note    LOS: 4 days   Patient Care Team:  Provider, No Known as PCP - General  4 Days Post-Op   Procedure(s):  Transmetatarsal amputation of Left Foot  Subjective   Interval History:   Awake in bed.  Intermittently answering questions.  Pleasantly demented.  No complaints.    Objective     Vital Signs:  Temp (24hrs), Av °F (37.2 °C), Min:98.6 °F (37 °C), Max:99.4 °F (37.4 °C)    /77   Pulse 68   Temp 98.6 °F (37 °C)   Resp 18   Ht 182.9 cm (72\")   Wt 81.6 kg (180 lb)   SpO2 90%   BMI 24.41 kg/m²     Labs:  Lab Results (last 24 hours)     Procedure Component Value Units Date/Time    POC Glucose Once [[id number]]  (Abnormal) Collected: 22 1236    Specimen: Blood Updated: 22 1239     Glucose 189 mg/dL      Comment: Meter: [id number] : [id number] Yasmin Ware       POC Glucose Once [[id number]]  (Abnormal) Collected: 22 0814    Specimen: Blood Updated: 22 0825     Glucose 237 mg/dL      Comment: Meter: [id number] : [id number] Stadius       Comprehensive Metabolic Panel [[id number]]  (Abnormal) Collected: 22 0614    Specimen: Blood Updated: 22 0749     Glucose 237 mg/dL      BUN 18 mg/dL      Creatinine 0.89 mg/dL      Sodium 138 mmol/L      Potassium 4.3 mmol/L      Chloride 104 mmol/L      CO2 28.0 mmol/L      Calcium 8.7 mg/dL      Total Protein 5.6 g/dL      Albumin 2.80 g/dL      ALT (SGPT) 12 U/L      AST (SGOT) 13 U/L      Alkaline Phosphatase 92 U/L      Total Bilirubin <0.2 mg/dL      Globulin 2.8 gm/dL      Comment: Calculated Result        A/G Ratio 1.0 g/dL      BUN/Creatinine Ratio 20.2     Anion Gap 6.0 mmol/L      eGFR 95.1 mL/min/1.73      Comment: National Kidney Foundation and American Society of Nephrology (ASN) Task Force recommended calculation based on the Chronic Kidney Disease Epidemiology Collaboration (CKD-EPI) equation refit without adjustment for race.       Narrative:      GFR Normal " >60  Chronic Kidney Disease <60  Kidney Failure <15      Vancomycin, Random [[id number]]  (Normal) Collected: 12/09/22 0614    Specimen: Blood Updated: 12/09/22 0749     Vancomycin Random 27.10 mcg/mL     Narrative:      Therapeutic Ranges for Vancomycin    Vancomycin Random   5.0-40.0 mcg/mL  Vancomycin Trough   5.0-20.0 mcg/mL  Vancomycin Peak     20.0-40.0 mcg/mL    CBC & Differential [[id number]]  (Abnormal) Collected: 12/09/22 0613    Specimen: Blood Updated: 12/09/22 0717    Narrative:      The following orders were created for panel order CBC & Differential.  Procedure                               Abnormality         Status                     ---------                               -----------         ------                     CBC Auto Differential[[id number]]        Abnormal            Final result                 Please view results for these tests on the individual orders.    CBC Auto Differential [[id number]]  (Abnormal) Collected: 12/09/22 0613    Specimen: Blood Updated: 12/09/22 0717     WBC 8.04 10*3/mm3      RBC 2.68 10*6/mm3      Hemoglobin 7.4 g/dL      Hematocrit 23.2 %      MCV 86.6 fL      MCH 27.6 pg      MCHC 31.9 g/dL      RDW 14.3 %      RDW-SD 44.7 fl      MPV 11.0 fL      Platelets 272 10*3/mm3      Neutrophil % 67.2 %      Lymphocyte % 20.6 %      Monocyte % 8.7 %      Eosinophil % 3.1 %      Basophil % 0.2 %      Immature Grans % 0.2 %      Neutrophils, Absolute 5.39 10*3/mm3      Lymphocytes, Absolute 1.66 10*3/mm3      Monocytes, Absolute 0.70 10*3/mm3      Eosinophils, Absolute 0.25 10*3/mm3      Basophils, Absolute 0.02 10*3/mm3      Immature Grans, Absolute 0.02 10*3/mm3      nRBC 0.0 /100 WBC     POC Glucose Once [[id number]]  (Abnormal) Collected: 12/08/22 2126    Specimen: Blood Updated: 12/08/22 2129     Glucose 231 mg/dL      Comment: Meter: [id number] : [id number] Jimmy Abad       POC Glucose Once [[id number]]  (Abnormal) Collected: 12/08/22 1703    Specimen: Blood  Updated: 12/08/22 1707     Glucose 264 mg/dL      Comment: Meter: [id number] : [id number] Yasmin Ware             Physical Exam:  left LE: Splint taken down for exam   Leg compartments soft/compressible   Skin at amp site WWP   Sutures in place, incision clean, dry and intact, no drainage, no erythema   CR brisk at skin edges of incision site     Assessment/Plan:  4 Days Post-Op status post:  Procedure(s):  Transmetatarsal amputation of Left Foot    -Dressing changed 12/9  -NWB operative extremity  -Advance diet as tolerated  -Keep splint/operative dressing clean and dry  -DVT prophylaxis, Eliquis  -Continue antibiotics per ID recommendations, appreciate ID recs  -Pain control  -Follow-up Intra-Op cultures  -Elevate operative extremity  -Okay for DC from ortho standpoint, f/u information and DC instructions placed in chart, f/u in clinic in 2 weeks for wound check  -Rest of management per primary team    Yeison Petty MD  12/09/22  13:50 EST

## 2022-12-09 NOTE — PLAN OF CARE
Goal Outcome Evaluation:  Plan of Care Reviewed With: patient        Progress: no change  Outcome Evaluation: Patient continues to be limited by weakness, decreased activity tolerance, and cognitive impairments. Pt had some difficulty following directions, attempted STS by but unable to offweight hips or maintain WBing status. He demonstrated improvement in sitting balance. Will continue to progress as tolerated. Recommend SNF at d/c.

## 2022-12-10 ENCOUNTER — APPOINTMENT (OUTPATIENT)
Dept: GENERAL RADIOLOGY | Facility: HOSPITAL | Age: 67
End: 2022-12-10

## 2022-12-10 LAB
ALBUMIN SERPL-MCNC: 3.1 G/DL (ref 3.5–5.2)
ALBUMIN/GLOB SERPL: 0.9 G/DL
ALP SERPL-CCNC: 85 U/L (ref 39–117)
ALT SERPL W P-5'-P-CCNC: 10 U/L (ref 1–41)
ANION GAP SERPL CALCULATED.3IONS-SCNC: 7 MMOL/L (ref 5–15)
AST SERPL-CCNC: 12 U/L (ref 1–40)
BACTERIA SPEC ANAEROBE CULT: NORMAL
BASOPHILS # BLD AUTO: 0.02 10*3/MM3 (ref 0–0.2)
BASOPHILS NFR BLD AUTO: 0.3 % (ref 0–1.5)
BILIRUB SERPL-MCNC: <0.2 MG/DL (ref 0–1.2)
BUN SERPL-MCNC: 14 MG/DL (ref 8–23)
BUN/CREAT SERPL: 12.1 (ref 7–25)
CALCIUM SPEC-SCNC: 9.1 MG/DL (ref 8.6–10.5)
CHLORIDE SERPL-SCNC: 105 MMOL/L (ref 98–107)
CO2 SERPL-SCNC: 29 MMOL/L (ref 22–29)
CREAT SERPL-MCNC: 1.16 MG/DL (ref 0.76–1.27)
DEPRECATED RDW RBC AUTO: 46.5 FL (ref 37–54)
EGFRCR SERPLBLD CKD-EPI 2021: 69.9 ML/MIN/1.73
EOSINOPHIL # BLD AUTO: 0.27 10*3/MM3 (ref 0–0.4)
EOSINOPHIL NFR BLD AUTO: 3.9 % (ref 0.3–6.2)
ERYTHROCYTE [DISTWIDTH] IN BLOOD BY AUTOMATED COUNT: 14.6 % (ref 12.3–15.4)
GLOBULIN UR ELPH-MCNC: 3.6 GM/DL
GLUCOSE BLDC GLUCOMTR-MCNC: 127 MG/DL (ref 70–130)
GLUCOSE BLDC GLUCOMTR-MCNC: 203 MG/DL (ref 70–130)
GLUCOSE BLDC GLUCOMTR-MCNC: 211 MG/DL (ref 70–130)
GLUCOSE BLDC GLUCOMTR-MCNC: 226 MG/DL (ref 70–130)
GLUCOSE SERPL-MCNC: 196 MG/DL (ref 65–99)
HCT VFR BLD AUTO: 25.6 % (ref 37.5–51)
HGB BLD-MCNC: 8.3 G/DL (ref 13–17.7)
IMM GRANULOCYTES # BLD AUTO: 0.02 10*3/MM3 (ref 0–0.05)
IMM GRANULOCYTES NFR BLD AUTO: 0.3 % (ref 0–0.5)
LYMPHOCYTES # BLD AUTO: 1.68 10*3/MM3 (ref 0.7–3.1)
LYMPHOCYTES NFR BLD AUTO: 24.1 % (ref 19.6–45.3)
MCH RBC QN AUTO: 28.1 PG (ref 26.6–33)
MCHC RBC AUTO-ENTMCNC: 32.4 G/DL (ref 31.5–35.7)
MCV RBC AUTO: 86.8 FL (ref 79–97)
MONOCYTES # BLD AUTO: 0.55 10*3/MM3 (ref 0.1–0.9)
MONOCYTES NFR BLD AUTO: 7.9 % (ref 5–12)
NEUTROPHILS NFR BLD AUTO: 4.42 10*3/MM3 (ref 1.7–7)
NEUTROPHILS NFR BLD AUTO: 63.5 % (ref 42.7–76)
NRBC BLD AUTO-RTO: 0 /100 WBC (ref 0–0.2)
PLATELET # BLD AUTO: 290 10*3/MM3 (ref 140–450)
PMV BLD AUTO: 10.7 FL (ref 6–12)
POTASSIUM SERPL-SCNC: 3.8 MMOL/L (ref 3.5–5.2)
PROT SERPL-MCNC: 6.7 G/DL (ref 6–8.5)
RBC # BLD AUTO: 2.95 10*6/MM3 (ref 4.14–5.8)
SODIUM SERPL-SCNC: 141 MMOL/L (ref 136–145)
WBC NRBC COR # BLD: 6.96 10*3/MM3 (ref 3.4–10.8)

## 2022-12-10 PROCEDURE — 25010000002 VANCOMYCIN PER 500 MG

## 2022-12-10 PROCEDURE — 85025 COMPLETE CBC W/AUTO DIFF WBC: CPT | Performed by: INTERNAL MEDICINE

## 2022-12-10 PROCEDURE — 71045 X-RAY EXAM CHEST 1 VIEW: CPT

## 2022-12-10 PROCEDURE — 82962 GLUCOSE BLOOD TEST: CPT

## 2022-12-10 PROCEDURE — 63710000001 INSULIN LISPRO (HUMAN) PER 5 UNITS: Performed by: PHYSICIAN ASSISTANT

## 2022-12-10 PROCEDURE — 25010000002 CEFTRIAXONE PER 250 MG: Performed by: INTERNAL MEDICINE

## 2022-12-10 PROCEDURE — 80053 COMPREHEN METABOLIC PANEL: CPT | Performed by: INTERNAL MEDICINE

## 2022-12-10 PROCEDURE — 63710000001 INSULIN DETEMIR PER 5 UNITS: Performed by: INTERNAL MEDICINE

## 2022-12-10 PROCEDURE — 99232 SBSQ HOSP IP/OBS MODERATE 35: CPT | Performed by: NURSE PRACTITIONER

## 2022-12-10 RX ADMIN — QUETIAPINE FUMARATE 12.5 MG: 25 TABLET ORAL at 08:20

## 2022-12-10 RX ADMIN — Medication 10 ML: at 09:34

## 2022-12-10 RX ADMIN — Medication 10 ML: at 22:55

## 2022-12-10 RX ADMIN — OLANZAPINE 5 MG: 5 TABLET, FILM COATED ORAL at 08:21

## 2022-12-10 RX ADMIN — APIXABAN 2.5 MG: 2.5 TABLET, FILM COATED ORAL at 22:54

## 2022-12-10 RX ADMIN — POLYETHYLENE GLYCOL 3350 17 G: 17 POWDER, FOR SOLUTION ORAL at 09:32

## 2022-12-10 RX ADMIN — INSULIN LISPRO 3 UNITS: 100 INJECTION, SOLUTION INTRAVENOUS; SUBCUTANEOUS at 17:47

## 2022-12-10 RX ADMIN — HYDRALAZINE HYDROCHLORIDE 25 MG: 25 TABLET, FILM COATED ORAL at 15:52

## 2022-12-10 RX ADMIN — APIXABAN 2.5 MG: 2.5 TABLET, FILM COATED ORAL at 08:21

## 2022-12-10 RX ADMIN — OLANZAPINE 5 MG: 5 TABLET, FILM COATED ORAL at 22:54

## 2022-12-10 RX ADMIN — INSULIN LISPRO 3 UNITS: 100 INJECTION, SOLUTION INTRAVENOUS; SUBCUTANEOUS at 12:39

## 2022-12-10 RX ADMIN — Medication 5 MG: at 22:54

## 2022-12-10 RX ADMIN — AMLODIPINE BESYLATE 10 MG: 10 TABLET ORAL at 08:19

## 2022-12-10 RX ADMIN — LISINOPRIL 40 MG: 40 TABLET ORAL at 22:54

## 2022-12-10 RX ADMIN — VANCOMYCIN HYDROCHLORIDE 750 MG: 750 INJECTION, SOLUTION INTRAVENOUS at 09:32

## 2022-12-10 RX ADMIN — Medication 10 MG: at 16:56

## 2022-12-10 RX ADMIN — DOCUSATE SODIUM 50 MG AND SENNOSIDES 8.6 MG 2 TABLET: 8.6; 5 TABLET, FILM COATED ORAL at 22:54

## 2022-12-10 RX ADMIN — VANCOMYCIN HYDROCHLORIDE 750 MG: 750 INJECTION, SOLUTION INTRAVENOUS at 22:52

## 2022-12-10 RX ADMIN — HYDRALAZINE HYDROCHLORIDE 25 MG: 25 TABLET, FILM COATED ORAL at 22:54

## 2022-12-10 RX ADMIN — INSULIN DETEMIR 15 UNITS: 100 INJECTION, SOLUTION SUBCUTANEOUS at 22:54

## 2022-12-10 RX ADMIN — CEFTRIAXONE 2 G: 2 INJECTION, POWDER, FOR SOLUTION INTRAMUSCULAR; INTRAVENOUS at 19:58

## 2022-12-10 RX ADMIN — HYDRALAZINE HYDROCHLORIDE 25 MG: 25 TABLET, FILM COATED ORAL at 06:13

## 2022-12-10 NOTE — PLAN OF CARE
Goal Outcome Evaluation:  Plan of Care Reviewed With: patient        Progress: no change  Outcome Evaluation: Omkar is confused x 2; oriented to person and place. Generalized weakness; LLE elevated on pillows; meds given crushed in pudding. PICC not used because no order to use; provider notified; chest xray ordered. Incont of urine.

## 2022-12-10 NOTE — PROGRESS NOTES
[facility] Medicine Services  PROGRESS NOTE    Patient Name: Omkar Nava  : 1955  MRN: [id number]    Date of Admission: 2022  Primary Care Physician: Provider, No Known    Subjective   Subjective     CC:  F/U TMA L foot, osteomyelitis    HPI:  Sitting upright in bed this morning. No new complaints.      ROS:  Difficult to obtain due to mental status    Objective   Objective     Vital Signs:   Temp:  [98.5 °F (36.9 °C)-98.7 °F (37.1 °C)] 98.5 °F (36.9 °C)  Heart Rate:  [65-76] 65  Resp:  [16-18] 18  BP: (139-174)/(68-80) 174/80     Physical Exam:  Constitutional: No acute distress, awake, alert, upright in chair with feet elevated, no family at bedside.   HENT: NCAT, mucous membranes moist  Respiratory: Clear to auscultation bilaterally, respiratory effort normal on room air  Cardiovascular: RRR, no murmurs, rubs, or gallops  Gastrointestinal: Positive bowel sounds, soft, nontender, nondistended  Musculoskeletal: L LE with splint in place-intact with no notable drainage  Psychiatric: Flat affect but appropriate  Neurologic: Oriented to person, speech clear, moves all extremities  Skin: No rashes noted to exposed skin  No significant change in exam from       Results Reviewed:  LAB RESULTS:      Lab 22  0613 22  0811 22  0503 22  1138   WBC 8.04 8.12 11.88* 8.63   HEMOGLOBIN 7.4* 7.7* 8.2* 9.8*   HEMATOCRIT 23.2* 23.9* 25.0* 30.4*   PLATELETS 272 248 287 333   NEUTROS ABS 5.39 5.21  --   --    IMMATURE GRANS (ABS) 0.02 0.02  --   --    LYMPHS ABS 1.66 1.95  --   --    MONOS ABS 0.70 0.67  --   --    EOS ABS 0.25 0.24  --   --    MCV 86.6 87.5 86.2 86.6   CRP  --  0.95*  --   --          Lab 22  0614 22  0811 22  0701 22  1138   SODIUM 138 143 139 141   POTASSIUM 4.3 4.2 4.4 4.3   CHLORIDE 104 110* 103 105   CO2 28.0 28.0 26.0 28.0   ANION GAP 6.0 5.0 10.0 8.0   BUN 18 15 20 22   CREATININE 0.89 0.95 1.03 1.03   EGFR 95.1  88.8 80.6 80.6   GLUCOSE 237* 112* 273* 167*   CALCIUM 8.7 8.4* 8.9 9.8         Lab 12/09/22  0614 12/07/22  0811   TOTAL PROTEIN 5.6* 5.6*   ALBUMIN 2.80* 2.80*   GLOBULIN 2.8 2.8   ALT (SGPT) 12 12   AST (SGOT) 13 13   BILIRUBIN <0.2 <0.2   ALK PHOS 92 73                     Brief Urine Lab Results  (Last result in the past 365 days)      Color   Clarity   Blood   Leuk Est   Nitrite   Protein   CREAT   Urine HCG        06/12/22 2056 Yellow   Clear     Negative                     Microbiology Results Abnormal     Procedure Component Value - Date/Time    Anaerobic Culture - Tissue, Toe, Left [[id number]]  (Normal) Collected: 12/05/22 1837    Lab Status: Preliminary result Specimen: Tissue from Toe, Left Updated: 12/08/22 0738     Anaerobic Culture No anaerobes isolated at 3 days    AFB Culture - Tissue, Toe, Left [[id number]] Collected: 12/05/22 1837    Lab Status: Preliminary result Specimen: Tissue from Toe, Left Updated: 12/06/22 1328     AFB Stain No acid fast bacilli seen on concentrated smear          XR Chest 1 View    Result Date: 12/10/2022   DATE OF EXAM: 12/10/2022 2:48 AM  PROCEDURE: XR CHEST 1 VW-  INDICATIONS: PICC placement confirmation; M86.172-Other acute osteomyelitis, left ankle and foot  COMPARISON: 04/29/2022 at 12:17 PM  TECHNIQUE: [Portable chest radiograph]  FINDINGS: Ill-defined infiltrates are noted throughout the mid to lower lungs bilaterally with associated diffuse interstitial changes. There are likely small bilateral pleural effusions. The cardiac silhouette and mediastinum are stable given differences in technique. A right PICC line is noted with its tip positioned near the SVC/right atrial junction. The line is in good position. No acute osseous abnormalities are seen.      Impression: 1.  The right PICC line distal tip is positioned near the SVC/right atrial junction and is in good position. The line is ready for use. 2.  Ill-defined infiltrates are noted throughout the lungs  bilaterally with associated diffuse interstitial changes bilaterally. Small bilateral pleural effusions are noted.  This report was finalized on 12/10/2022 7:48 AM by Omkar Canales MD.            I have reviewed the medications:  Scheduled Meds:[START ON 12/12/2022] Pharmacy Consult, , Does not apply, Once  amLODIPine, 10 mg, Oral, Q24H  apixaban, 2.5 mg, Oral, BID  cefTRIAXone, 2 g, Intravenous, Q24H  hydrALAZINE, 25 mg, Oral, Q8H  insulin detemir, 15 Units, Subcutaneous, Nightly  insulin lispro, 0-7 Units, Subcutaneous, TID AC  lisinopril, 40 mg, Oral, Daily  melatonin, 5 mg, Oral, Nightly  OLANZapine, 5 mg, Oral, BID  QUEtiapine, 12.5 mg, Oral, Daily  senna-docusate sodium, 2 tablet, Oral, Nightly  sodium chloride, 10 mL, Intravenous, Q12H  sodium chloride, 10 mL, Intravenous, Q12H  vancomycin, 750 mg, Intravenous, Q12H      Continuous Infusions:lactated ringers, 9 mL/hr, Last Rate: Stopped (12/08/22 1603)  Pharmacy to dose vancomycin,       PRN Meds:.•  acetaminophen **OR** acetaminophen  •  bisacodyl  •  dextrose  •  dextrose  •  glucagon (human recombinant)  •  haloperidol  •  HYDROmorphone **AND** naloxone  •  LORazepam  •  melatonin  •  ondansetron **OR** ondansetron  •  oxyCODONE  •  Pharmacy to dose vancomycin  •  polyethylene glycol  •  sodium chloride  •  sodium chloride  •  sodium chloride    Assessment & Plan   Assessment & Plan     Active Hospital Problems    Diagnosis  POA   • **Acute osteomyelitis of left foot (HCC) [M86.172]  Yes   • Neurocognitive disorder [R41.9]  Yes   • Type 2 diabetes mellitus (HCC) [E11.9]  Yes   • Essential hypertension [I10]  Yes      Resolved Hospital Problems   No resolved problems to display.        Brief Hospital Course to date:  Omkar Nava is a 67 y.o. male with a history of dementia, T2DM, HTN, and polysubstance abuse who was admitted here on 12/5 for planned transmetatarsal amputation of the left foot due to osteomyelitis    This patient's problems and plans  were partially entered by my partner and updated as appropriate by me 12/10/22.       Osteomyelitis of left foot  S/p Left Transmetatarsal amputation  -S/p transmetatarsal amputation of left foot on 12/5 by orthopedics, Dr Petty  -ID following, continue antibiotics: Vancomycin and Cefepime until 1/18/2023. Patient to follow-up with Dr Gordon 2 weeks post discharge.  -s/p PICC placement on 12/9  -Pain control  -PT/OT, plan is back to long-term care. Facility rep is planning on coming to the hospital for an onsite visit on Monday.       HTN  -Continue current mediations      T2DM  -continue levemir and SSI     Neurocognitive disorder, psychosis  -prn Haldol and Ativan for agitation    Chronic Anemia  -Monitor H&H intermittently     Disposition:  The patient is currently medically ready for discharge back to the long term care facility.  Per case management, the facility rep is planning on coming to the hospital to see the patient for a site visit on Monday.      Expected Discharge Location and Transportation: Back to long-term care via transportation   Expected Discharge Date: 12/13  DVT prophylaxis:  Medical and mechanical DVT prophylaxis orders are present.     AM-PAC 6 Clicks Score (PT): 8 (12/09/22 0839)    CODE STATUS:   There are no questions and answers to display.       Jory Rocha, NARGIS  12/10/22

## 2022-12-10 NOTE — PLAN OF CARE
Goal Outcome Evaluation:  Plan of Care Reviewed With: patient        Progress: no change  Outcome Evaluation: VSS. Pt remains confused, but pleasant. Total feed. Denies pain. PICC line in use. Will continue to monitor.

## 2022-12-11 LAB
GLUCOSE BLDC GLUCOMTR-MCNC: 165 MG/DL (ref 70–130)
GLUCOSE BLDC GLUCOMTR-MCNC: 220 MG/DL (ref 70–130)
GLUCOSE BLDC GLUCOMTR-MCNC: 227 MG/DL (ref 70–130)
GLUCOSE BLDC GLUCOMTR-MCNC: 245 MG/DL (ref 70–130)

## 2022-12-11 PROCEDURE — 82962 GLUCOSE BLOOD TEST: CPT

## 2022-12-11 PROCEDURE — 25010000002 VANCOMYCIN PER 500 MG

## 2022-12-11 PROCEDURE — 99231 SBSQ HOSP IP/OBS SF/LOW 25: CPT | Performed by: INTERNAL MEDICINE

## 2022-12-11 PROCEDURE — 63710000001 INSULIN DETEMIR PER 5 UNITS: Performed by: INTERNAL MEDICINE

## 2022-12-11 PROCEDURE — 25010000002 CEFTRIAXONE PER 250 MG: Performed by: INTERNAL MEDICINE

## 2022-12-11 PROCEDURE — 63710000001 INSULIN LISPRO (HUMAN) PER 5 UNITS: Performed by: PHYSICIAN ASSISTANT

## 2022-12-11 RX ADMIN — CEFTRIAXONE 2 G: 2 INJECTION, POWDER, FOR SOLUTION INTRAMUSCULAR; INTRAVENOUS at 21:07

## 2022-12-11 RX ADMIN — AMLODIPINE BESYLATE 10 MG: 10 TABLET ORAL at 08:06

## 2022-12-11 RX ADMIN — Medication 10 ML: at 10:44

## 2022-12-11 RX ADMIN — HALOPERIDOL 2 MG: 2 SOLUTION ORAL at 07:33

## 2022-12-11 RX ADMIN — HYDRALAZINE HYDROCHLORIDE 25 MG: 25 TABLET, FILM COATED ORAL at 14:40

## 2022-12-11 RX ADMIN — OLANZAPINE 5 MG: 5 TABLET, FILM COATED ORAL at 21:13

## 2022-12-11 RX ADMIN — QUETIAPINE FUMARATE 12.5 MG: 25 TABLET ORAL at 08:06

## 2022-12-11 RX ADMIN — Medication 10 ML: at 21:20

## 2022-12-11 RX ADMIN — LISINOPRIL 40 MG: 40 TABLET ORAL at 21:14

## 2022-12-11 RX ADMIN — APIXABAN 2.5 MG: 2.5 TABLET, FILM COATED ORAL at 21:13

## 2022-12-11 RX ADMIN — INSULIN DETEMIR 15 UNITS: 100 INJECTION, SOLUTION SUBCUTANEOUS at 21:14

## 2022-12-11 RX ADMIN — HYDRALAZINE HYDROCHLORIDE 25 MG: 25 TABLET, FILM COATED ORAL at 21:14

## 2022-12-11 RX ADMIN — Medication 10 ML: at 21:21

## 2022-12-11 RX ADMIN — OXYCODONE 5 MG: 5 TABLET ORAL at 21:14

## 2022-12-11 RX ADMIN — HYDRALAZINE HYDROCHLORIDE 25 MG: 25 TABLET, FILM COATED ORAL at 05:59

## 2022-12-11 RX ADMIN — OLANZAPINE 5 MG: 5 TABLET, FILM COATED ORAL at 08:06

## 2022-12-11 RX ADMIN — INSULIN LISPRO 2 UNITS: 100 INJECTION, SOLUTION INTRAVENOUS; SUBCUTANEOUS at 08:12

## 2022-12-11 RX ADMIN — VANCOMYCIN HYDROCHLORIDE 750 MG: 750 INJECTION, SOLUTION INTRAVENOUS at 21:20

## 2022-12-11 RX ADMIN — INSULIN LISPRO 3 UNITS: 100 INJECTION, SOLUTION INTRAVENOUS; SUBCUTANEOUS at 18:12

## 2022-12-11 RX ADMIN — LORAZEPAM 2 MG: 1 TABLET ORAL at 16:44

## 2022-12-11 RX ADMIN — DOCUSATE SODIUM 50 MG AND SENNOSIDES 8.6 MG 2 TABLET: 8.6; 5 TABLET, FILM COATED ORAL at 21:14

## 2022-12-11 RX ADMIN — INSULIN LISPRO 3 UNITS: 100 INJECTION, SOLUTION INTRAVENOUS; SUBCUTANEOUS at 12:44

## 2022-12-11 RX ADMIN — Medication 5 MG: at 21:14

## 2022-12-11 RX ADMIN — APIXABAN 2.5 MG: 2.5 TABLET, FILM COATED ORAL at 08:06

## 2022-12-11 RX ADMIN — VANCOMYCIN HYDROCHLORIDE 750 MG: 750 INJECTION, SOLUTION INTRAVENOUS at 10:36

## 2022-12-11 NOTE — PROGRESS NOTES
[facility] Medicine Services  PROGRESS NOTE    Patient Name: Omkar Nava  : 1955  MRN: [id number]    Date of Admission: 2022  Primary Care Physician: Provider, No Known    Subjective   Subjective     CC:  F/U TMA L foot, osteomyelitis    HPI: Tmax 99.6.    Stool x 2 yesterday.    ROS:  Difficult to obtain due to mental status  Per EMR, he eats well     Objective   Objective     Vital Signs:   Temp:  [98.7 °F (37.1 °C)-99.6 °F (37.6 °C)] 98.7 °F (37.1 °C)  Heart Rate:  [69-86] 69  Resp:  [18] 18  BP: (152-174)/(71-81) 174/81     Physical Exam:  Constitutional: No acute distress, asleep in bed.  HENT: NCAT, mucous membranes moist  Respiratory: Clear to auscultation bilaterally, respiratory effort normal on room air  Cardiovascular: RRR  Gastrointestinal: no distention   Musculoskeletal: L LE with splint in place-intact with no notable drainage  Psychiatric: cannot assess  Neurologic: asleep, not restless  Skin: No rashes noted to exposed skin        Results Reviewed:  LAB RESULTS:      Lab 12/10/22  1320 22  0613 22  0811 22  0503 22  1138   WBC 6.96 8.04 8.12 11.88* 8.63   HEMOGLOBIN 8.3* 7.4* 7.7* 8.2* 9.8*   HEMATOCRIT 25.6* 23.2* 23.9* 25.0* 30.4*   PLATELETS 290 272 248 287 333   NEUTROS ABS 4.42 5.39 5.21  --   --    IMMATURE GRANS (ABS) 0.02 0.02 0.02  --   --    LYMPHS ABS 1.68 1.66 1.95  --   --    MONOS ABS 0.55 0.70 0.67  --   --    EOS ABS 0.27 0.25 0.24  --   --    MCV 86.8 86.6 87.5 86.2 86.6   CRP  --   --  0.95*  --   --          Lab 12/10/22  1320 22  0614 22  0811 22  0701 22  1138   SODIUM 141 138 143 139 141   POTASSIUM 3.8 4.3 4.2 4.4 4.3   CHLORIDE 105 104 110* 103 105   CO2 29.0 28.0 28.0 26.0 28.0   ANION GAP 7.0 6.0 5.0 10.0 8.0   BUN 14 18 15 20 22   CREATININE 1.16 0.89 0.95 1.03 1.03   EGFR 69.9 95.1 88.8 80.6 80.6   GLUCOSE 196* 237* 112* 273* 167*   CALCIUM 9.1 8.7 8.4* 8.9 9.8         Lab  12/10/22  1320 12/09/22  0614 12/07/22  0811   TOTAL PROTEIN 6.7 5.6* 5.6*   ALBUMIN 3.10* 2.80* 2.80*   GLOBULIN 3.6 2.8 2.8   ALT (SGPT) 10 12 12   AST (SGOT) 12 13 13   BILIRUBIN <0.2 <0.2 <0.2   ALK PHOS 85 92 73                     Brief Urine Lab Results  (Last result in the past 365 days)      Color   Clarity   Blood   Leuk Est   Nitrite   Protein   CREAT   Urine HCG        06/12/22 2056 Yellow   Clear     Negative                     Microbiology Results Abnormal     Procedure Component Value - Date/Time    Fungus Culture - Tissue, Toe, Left [[id number]] Collected: 12/05/22 1837    Lab Status: Preliminary result Specimen: Tissue from Toe, Left Updated: 12/10/22 1916     Fungus Culture No fungus isolated at less than 1 week    AFB Culture - Tissue, Toe, Left [[id number]] Collected: 12/05/22 1837    Lab Status: Preliminary result Specimen: Tissue from Toe, Left Updated: 12/10/22 1916     AFB Culture No AFB isolated at less than 1 week     AFB Stain No acid fast bacilli seen on concentrated smear    Anaerobic Culture - Tissue, Toe, Left [[id number]]  (Normal) Collected: 12/05/22 1837    Lab Status: Final result Specimen: Tissue from Toe, Left Updated: 12/10/22 1148     Anaerobic Culture No anaerobes isolated at 5 days          XR Chest 1 View    Result Date: 12/10/2022   DATE OF EXAM: 12/10/2022 2:48 AM  PROCEDURE: XR CHEST 1 VW-  INDICATIONS: PICC placement confirmation; M86.172-Other acute osteomyelitis, left ankle and foot  COMPARISON: 04/29/2022 at 12:17 PM  TECHNIQUE: [Portable chest radiograph]  FINDINGS: Ill-defined infiltrates are noted throughout the mid to lower lungs bilaterally with associated diffuse interstitial changes. There are likely small bilateral pleural effusions. The cardiac silhouette and mediastinum are stable given differences in technique. A right PICC line is noted with its tip positioned near the SVC/right atrial junction. The line is in good position. No acute osseous abnormalities  are seen.      Impression: 1.  The right PICC line distal tip is positioned near the SVC/right atrial junction and is in good position. The line is ready for use. 2.  Ill-defined infiltrates are noted throughout the lungs bilaterally with associated diffuse interstitial changes bilaterally. Small bilateral pleural effusions are noted.  This report was finalized on 12/10/2022 7:48 AM by Omkar Canales MD.            I have reviewed the medications:  Scheduled Meds:[START ON 12/12/2022] Pharmacy Consult, , Does not apply, Once  amLODIPine, 10 mg, Oral, Q24H  apixaban, 2.5 mg, Oral, BID  cefTRIAXone, 2 g, Intravenous, Q24H  hydrALAZINE, 25 mg, Oral, Q8H  insulin detemir, 15 Units, Subcutaneous, Nightly  insulin lispro, 0-7 Units, Subcutaneous, TID AC  lisinopril, 40 mg, Oral, Daily  melatonin, 5 mg, Oral, Nightly  OLANZapine, 5 mg, Oral, BID  QUEtiapine, 12.5 mg, Oral, Daily  senna-docusate sodium, 2 tablet, Oral, Nightly  sodium chloride, 10 mL, Intravenous, Q12H  sodium chloride, 10 mL, Intravenous, Q12H  vancomycin, 750 mg, Intravenous, Q12H      Continuous Infusions:lactated ringers, 9 mL/hr, Last Rate: Stopped (12/08/22 1603)  Pharmacy to dose vancomycin,       PRN Meds:.•  acetaminophen **OR** acetaminophen  •  bisacodyl  •  dextrose  •  dextrose  •  glucagon (human recombinant)  •  haloperidol  •  HYDROmorphone **AND** naloxone  •  LORazepam  •  melatonin  •  ondansetron **OR** ondansetron  •  oxyCODONE  •  Pharmacy to dose vancomycin  •  polyethylene glycol  •  sodium chloride  •  sodium chloride  •  sodium chloride    Assessment & Plan   Assessment & Plan     Active Hospital Problems    Diagnosis  POA   • **Acute osteomyelitis of left foot (HCC) [M86.172]  Yes   • Neurocognitive disorder [R41.9]  Yes   • Type 2 diabetes mellitus (HCC) [E11.9]  Yes   • Essential hypertension [I10]  Yes      Resolved Hospital Problems   No resolved problems to display.        Brief Hospital Course to date:  Omkar Nava is a  67 y.o. male with a history of dementia, T2DM, HTN, and polysubstance abuse who was admitted here on 12/5 for planned transmetatarsal amputation of the left foot due to osteomyelitis.    In brief:  L hallux severe OM and forefoot infection had ray amputation 10/18, wound then dehisced, cultures positive for Kocuria.      This patient's problems and plans were partially entered by my partner and updated as appropriate by me 12/11/22.       Osteomyelitis of left foot  S/p Left Transmetatarsal amputation  -Left foot TMA on 12/5 by orthopedics, Dr Petty  - ID following, continue antibiotics: Vancomycin and Cefepime until 1/18/2023. Patient to follow-up with Dr Gordon and Dr Petty 2 weeks post discharge.  -s/p PICC placement on 12/9  -Pain control  -PT/OT, plan is back to long-term care. Facility rep is planning on coming to the hospital for an onsite visit on Monday.       HTN  -Continue current mediations      T2DM  -continue levemir and SSI     Neurocognitive disorder, psychosis  -prn Haldol and Ativan for agitation    Chronic Anemia  -Monitor H&H intermittently     Disposition:  The patient is currently medically ready for discharge back to the long term care facility.  Per case management, the facility rep is planning on coming to the hospital to see the patient for a site visit on Monday.      Expected Discharge Location and Transportation: Back to long-term care via transportation   Expected Discharge Date: 12/13  DVT prophylaxis:  Medical and mechanical DVT prophylaxis orders are present.     AM-PAC 6 Clicks Score (PT): 8 (12/10/22 0800)    CODE STATUS:   Code Status and Medical Interventions:   Ordered at: 12/10/22 0947     Code Status (Patient has no pulse and is not breathing):    CPR (Attempt to Resuscitate)     Medical Interventions (Patient has pulse or is breathing):    Full Support     Release to patient:    Routine Release       Diane Pickens MD  12/11/22

## 2022-12-11 NOTE — PLAN OF CARE
Goal Outcome Evaluation:  Plan of Care Reviewed With: patient, family        Progress: no change  Outcome Evaluation: Confusion and meds delayed on occaison due to pt refusal. Very forgetful.  Multiple attemptws to get out of bed. Reirentation provided. Ativan given for anxiety. Family visited. Awaiting transport to rehab Tuesday.

## 2022-12-11 NOTE — PLAN OF CARE
Goal Outcome Evaluation:  Plan of Care Reviewed With: patient        Progress: no change  Omkar is confused x 2; oriented to person and place. Generalized weakness; LLE elevated on pillows; meds given crushed in pudding.  Incont of urine.

## 2022-12-12 LAB
ANION GAP SERPL CALCULATED.3IONS-SCNC: 7 MMOL/L (ref 5–15)
BUN SERPL-MCNC: 19 MG/DL (ref 8–23)
BUN/CREAT SERPL: 18.4 (ref 7–25)
CALCIUM SPEC-SCNC: 8.9 MG/DL (ref 8.6–10.5)
CHLORIDE SERPL-SCNC: 103 MMOL/L (ref 98–107)
CO2 SERPL-SCNC: 29 MMOL/L (ref 22–29)
CREAT SERPL-MCNC: 1.03 MG/DL (ref 0.76–1.27)
EGFRCR SERPLBLD CKD-EPI 2021: 80.6 ML/MIN/1.73
GLUCOSE BLDC GLUCOMTR-MCNC: 184 MG/DL (ref 70–130)
GLUCOSE BLDC GLUCOMTR-MCNC: 189 MG/DL (ref 70–130)
GLUCOSE BLDC GLUCOMTR-MCNC: 254 MG/DL (ref 70–130)
GLUCOSE BLDC GLUCOMTR-MCNC: 257 MG/DL (ref 70–130)
GLUCOSE SERPL-MCNC: 184 MG/DL (ref 65–99)
POTASSIUM SERPL-SCNC: 4.2 MMOL/L (ref 3.5–5.2)
QT INTERVAL: 346 MS
QTC INTERVAL: 394 MS
SODIUM SERPL-SCNC: 139 MMOL/L (ref 136–145)
VANCOMYCIN SERPL-MCNC: 20.6 MCG/ML (ref 5–40)

## 2022-12-12 PROCEDURE — 63710000001 INSULIN DETEMIR PER 5 UNITS: Performed by: INTERNAL MEDICINE

## 2022-12-12 PROCEDURE — 80048 BASIC METABOLIC PNL TOTAL CA: CPT

## 2022-12-12 PROCEDURE — 82962 GLUCOSE BLOOD TEST: CPT

## 2022-12-12 PROCEDURE — 93005 ELECTROCARDIOGRAM TRACING: CPT | Performed by: FAMILY MEDICINE

## 2022-12-12 PROCEDURE — 25010000002 VANCOMYCIN PER 500 MG

## 2022-12-12 PROCEDURE — 93010 ELECTROCARDIOGRAM REPORT: CPT | Performed by: INTERNAL MEDICINE

## 2022-12-12 PROCEDURE — 99231 SBSQ HOSP IP/OBS SF/LOW 25: CPT | Performed by: FAMILY MEDICINE

## 2022-12-12 PROCEDURE — 25010000002 CEFTRIAXONE PER 250 MG: Performed by: INTERNAL MEDICINE

## 2022-12-12 PROCEDURE — 80202 ASSAY OF VANCOMYCIN: CPT

## 2022-12-12 PROCEDURE — 63710000001 INSULIN LISPRO (HUMAN) PER 5 UNITS: Performed by: PHYSICIAN ASSISTANT

## 2022-12-12 RX ADMIN — Medication 10 ML: at 21:08

## 2022-12-12 RX ADMIN — Medication 5 MG: at 21:10

## 2022-12-12 RX ADMIN — Medication 5 MG: at 21:07

## 2022-12-12 RX ADMIN — OXYCODONE 5 MG: 5 TABLET ORAL at 03:36

## 2022-12-12 RX ADMIN — Medication 10 ML: at 10:16

## 2022-12-12 RX ADMIN — HYDRALAZINE HYDROCHLORIDE 25 MG: 25 TABLET, FILM COATED ORAL at 21:07

## 2022-12-12 RX ADMIN — INSULIN LISPRO 2 UNITS: 100 INJECTION, SOLUTION INTRAVENOUS; SUBCUTANEOUS at 08:48

## 2022-12-12 RX ADMIN — Medication 10 ML: at 21:09

## 2022-12-12 RX ADMIN — HYDRALAZINE HYDROCHLORIDE 25 MG: 25 TABLET, FILM COATED ORAL at 05:57

## 2022-12-12 RX ADMIN — OLANZAPINE 5 MG: 5 TABLET, FILM COATED ORAL at 08:49

## 2022-12-12 RX ADMIN — AMLODIPINE BESYLATE 10 MG: 10 TABLET ORAL at 08:49

## 2022-12-12 RX ADMIN — CEFTRIAXONE 2 G: 2 INJECTION, POWDER, FOR SOLUTION INTRAMUSCULAR; INTRAVENOUS at 21:06

## 2022-12-12 RX ADMIN — LORAZEPAM 2 MG: 1 TABLET ORAL at 03:36

## 2022-12-12 RX ADMIN — INSULIN DETEMIR 15 UNITS: 100 INJECTION, SOLUTION SUBCUTANEOUS at 21:08

## 2022-12-12 RX ADMIN — INSULIN LISPRO 4 UNITS: 100 INJECTION, SOLUTION INTRAVENOUS; SUBCUTANEOUS at 17:21

## 2022-12-12 RX ADMIN — HYDRALAZINE HYDROCHLORIDE 25 MG: 25 TABLET, FILM COATED ORAL at 14:56

## 2022-12-12 RX ADMIN — APIXABAN 2.5 MG: 2.5 TABLET, FILM COATED ORAL at 21:08

## 2022-12-12 RX ADMIN — VANCOMYCIN HYDROCHLORIDE 750 MG: 750 INJECTION, SOLUTION INTRAVENOUS at 10:14

## 2022-12-12 RX ADMIN — OLANZAPINE 5 MG: 5 TABLET, FILM COATED ORAL at 21:07

## 2022-12-12 RX ADMIN — INSULIN LISPRO 2 UNITS: 100 INJECTION, SOLUTION INTRAVENOUS; SUBCUTANEOUS at 12:18

## 2022-12-12 RX ADMIN — QUETIAPINE FUMARATE 12.5 MG: 25 TABLET ORAL at 08:49

## 2022-12-12 RX ADMIN — LISINOPRIL 40 MG: 40 TABLET ORAL at 21:06

## 2022-12-12 RX ADMIN — APIXABAN 2.5 MG: 2.5 TABLET, FILM COATED ORAL at 08:49

## 2022-12-12 RX ADMIN — DOCUSATE SODIUM 50 MG AND SENNOSIDES 8.6 MG 2 TABLET: 8.6; 5 TABLET, FILM COATED ORAL at 21:06

## 2022-12-12 RX ADMIN — LORAZEPAM 2 MG: 1 TABLET ORAL at 21:06

## 2022-12-12 NOTE — CASE MANAGEMENT/SOCIAL WORK
Continued Stay Note  [facility]     Patient Name: Omkar Nava  MRN: [id number]  Today's Date: 12/12/2022    Admit Date: 12/5/2022    Plan: [facility]. If [facility] can accept Mr. Nava an ambulance is set up for Tuesday at 12 pm with [facility] ambulance.   Discharge Plan     Row Name 12/12/22 1031       Plan    Plan An [facility] is  [facility]. If [facility] can accept Mr. Nava an ambulance is set up for Tuesday at 12 pm with [facility] ambulance.               Discharge Codes    No documentation.               Expected Discharge Date and Time     Expected Discharge Date Expected Discharge Time    Dec 13, 2022             LUCINDA Markham

## 2022-12-12 NOTE — PROGRESS NOTES
On 6 INFECTIOUS DISEASE PROGRESS NOTE    Omkar Nava  1955  [id number]    Consult: 12/6/22, 10/15/22  Admit date: 12/5/2022    Requesting Provider: Omkar Seth MD  Evaluating physician:  Rayray Gordon MD  Reason for Consultation: left foot osteomyelitis, first toe, distal phalanx, reoperation with transmetatarsal amputation 12/5/2022 for poor wound healing and exposed bone  Chief Complaint: left foot pain      Subjective   History of present illness:  Patient is a  67 y.o. male with h/o T2DM, PN, HTN, normocytic anemia, and GERD who presented to [facility] ED on 10/14 for worsening left foot pain.  The patient is a poor historian and most of the information was taken from the chart.  He tripped at his nursing facility, [facility], while running the street in flip-flops and sustained a laceration of his first webspace.  He was taken to [facility] ED on 9/17/22 and found to have a small intra-articular vertical fracture through the base the the great toe proximal phalanx. Podiatry, Dr. Ruiz, saw patient and the wound with irrigated with suture placement.  The area was dressed and he was placed in a surgical shoe.  He was given Cefazolin x 1 dose and discharged back to his facility on Keflex for 7 days.  On 9/26/22, the nursing staff at increased pain, swelling, and redness around the foot and sent him to [facility] ED on that day.  He was found to have a displaced fracture of proximal phalanx of left hallux along with abscess and gas in tissues.  He was admitted to the hospital and underwent Left foot debridement and I and D on 9/27/22 by Dr. Ruiz with culture positive for MRSA (Resistant to Cefazolin, clindamycin, erm, oxacillin, tetracycline, Bactrim and sensitive to Vancomycin, Daptomycin-KB 1, and Linezolid KB 2), Corynebacterium striatum, and Enterococcus faecalis (sens to Vanc and ampicillin).  He underwent a second debridement and I and D on 9/30.  He was  given Cefazolin in ED and then changed to Daptomycin.  A PICC line was placed on 10/3 for Daptomycin infusions, but the antibiotic was too expensive for the SNF, so he was changed to oral Zyvox and Rifampin until 10/26.  The PICC line was removed on 10/4.  His blood cultures remained negative.  He was discharged back to his nursing facility on 10/4/22.      He was sent to [facility] ED on 10/14 after nursing staff noted that his left hallux appeared necrotic.  He has had no fever, chills, shortness of breath, nausea, vomiting, diarrhea, dysuria, or worsening foot pain.  On arrival, he is afebrile and hemodynamically stable.  On 10/15, his BP went up to 203/81.  Admitting labs were WBC 7200 with 69% neutrophils, ESR 67, CRP 3.07, Hgb 9.2, PCT 0.05, lactic acid 2.3, and creatinine 1.21.  A MRSA PCR was negative.  Blood and wound cultures are pending.  An MRI of the left foot on 10/15 showed wound around the left great toe with marrow signal alteration within the distal aspect of 1st MT as well as both phalanges c/w osteomyelitis and scattered foci in soft tissues that may be foci of gas without evidence of fluid collections or abscesses. An Xray of left foot showed soft tissue swelling of 1st digit with displaced fracture of medial base of 1st proximal phalanx.  He is currently on Cefepime and Vancomycin.  ID was asked by Dr. Seth on 10/15 to evaluate and manage his antibiotic therapy.  Patient has had some issues with psychosis over the past 6 months possibly related to drug and alcohol use.  This is also interfered with his acceptance of care.    The patient underwent a left first ray resection on 10/18/2022.  The patient was subsequently treated with vancomycin and ceftriaxone until 11/25/2022 for cultures which grew from his left foot with MRSA, Klebsiella, and Raoultella ornithinolytica.  His foot healed and then he developed left foot wound dehiscence with exposed bone.  He was readmitted to [facility] on  12/5/2022 and underwent a transmetatarsal amputation.  I was consulted on 12/6/2022 for further evaluation and treatment.  Patient is a poor historian.  He has no other localizing signs or symptoms of infection.    12/7/2022 history reviewed.  No high fevers or chills.  Pain controlled left foot.  Tolerating vancomycin and cefepime until 1/18/2023 or shorter.  Status post left foot transmetatarsal amputation 12/5.    12/8/2022 history reviewed.  Tolerating antibiotics.  Changing to vancomycin and ceftriaxone to continue until 1/18/2023 or shorter.  No fever.    12/9/22 hx rev.  No pain or fever on abx till 1/18/23.    12/12/2022 history reviewed.  Tolerating vancomycin and ceftriaxone until 1/18/2023 for left foot osteomyelitis status post transmetatarsal amputation 12/5/2022.  No fever.  Eating lunch with his family.    Past Medical History:   Diagnosis Date    Anemia     Dementia (HCC)     Diabetes mellitus (HCC)     Dysphagia     GERD (gastroesophageal reflux disease)     History of alcohol abuse     History of cocaine use     History of marijuana use     Hypertension     Osteomyelitis (HCC)     Poor historian     records obtained from nursing home records & his family    Visual impairment    HTN  Psychotic d/o, unspecified.    Past Surgical History:   Procedure Laterality Date    AMPUTATION FOOT Left 10/18/2022    Procedure: PARTIAL FIRST RAY AMPUTATION LEFT;  Surgeon: Yeison Petty MD;  Location: [facility];  Service: Orthopedics;  Laterality: Left;    AMPUTATION FOOT Left 12/5/2022    Procedure: Transmetatarsal of Left Foot;  Surgeon: Yeison Petty MD;  Location: [facility] OR;  Service: Orthopedics;  Laterality: Left;    EYE SURGERY     Left foot debridement/I and D x 2 9/27/22 and 9/30/22.  Left first ray amputation 10/18/2022.    Pediatric History   Patient Parents    Not on file     Other Topics Concern    Not on file   Social History Narrative    Caffeine 0-1 servings per day    Patient lives at home .    Patient lives at [facility]  Former smoker, no alcohol, smokes marijuana.     family history includes Diabetes in his brother, brother, father, maternal grandfather, maternal grandmother, mother, and sister; Hypertension in his brother, brother, father, and mother.    No Known Allergies      There is no immunization history on file for this patient.    Medication:    Current Facility-Administered Medications:     ! Vancomycin level 12/12 at AM labs. Do not give 0900 dose until pharmacy evaluates level, , Does not apply, Once, Preet Xiao, PharmD    acetaminophen (TYLENOL) tablet 650 mg, 650 mg, Oral, Q4H PRN **OR** acetaminophen (TYLENOL) suppository 650 mg, 650 mg, Rectal, Q4H PRN, Yeison Petty MD    amLODIPine (NORVASC) tablet 10 mg, 10 mg, Oral, Q24H, 10 mg at 12/12/22 0849 **AND** [DISCONTINUED] lisinopril (PRINIVIL,ZESTRIL) tablet 20 mg, 20 mg, Oral, Q24H, Yeison Petty MD, 20 mg at 12/06/22 0918    apixaban (ELIQUIS) tablet 2.5 mg, 2.5 mg, Oral, BID, Yeison Petty MD, 2.5 mg at 12/12/22 0849    bisacodyl (DULCOLAX) suppository 10 mg, 10 mg, Rectal, Daily PRN, Yeison Petty MD, 10 mg at 12/10/22 1656    cefTRIAXone (ROCEPHIN) 2 g/100 mL 0.9% NS IVPB (MBP), 2 g, Intravenous, Q24H, Rayray Gordon MD, 2 g at 12/11/22 2107    dextrose (D50W) (25 g/50 mL) IV injection 25 g, 25 g, Intravenous, Q15 Min PRN, Tea Bentley PA-C    dextrose (GLUTOSE) oral gel 15 g, 15 g, Oral, Q15 Min PRN, Tea Bentley PA-C    glucagon (human recombinant) (GLUCAGEN DIAGNOSTIC) injection 1 mg, 1 mg, Intramuscular, Q15 Min PRN, Tea Bentley PA-C    haloperidol (HALDOL) 2 MG/ML solution 2 mg, 2 mg, Oral, Daily PRN, Yeison Petty MD, 2 mg at 12/11/22 0733    hydrALAZINE (APRESOLINE) tablet 25 mg, 25 mg, Oral, Q8H, Marilyn Chopra DO, 25 mg at 12/12/22 0557    HYDROmorphone (DILAUDID) injection 0.5 mg, 0.5 mg, Intravenous, Q2H PRN, 0.5 mg at 12/06/22 0714 **AND**  naloxone (NARCAN) injection 0.1 mg, 0.1 mg, Intravenous, Q5 Min PRN, Yeison Petty MD    insulin detemir (LEVEMIR) injection 15 Units, 15 Units, Subcutaneous, Nightly, MiniDiane MD, 15 Units at 12/11/22 2114    Insulin Lispro (humaLOG) injection 0-7 Units, 0-7 Units, Subcutaneous, TID HANNA, Tea Bentley PA-C, 2 Units at 12/12/22 0848    lactated ringers infusion, 9 mL/hr, Intravenous, Continuous, Yeison Petty MD, Stopped at 12/08/22 1603    lisinopril (PRINIVIL,ZESTRIL) tablet 40 mg, 40 mg, Oral, Daily, Yeison Petty MD, 40 mg at 12/11/22 2114    LORazepam (ATIVAN) tablet 2 mg, 2 mg, Oral, Q6H PRN, Yeison Petty MD, 2 mg at 12/12/22 0336    melatonin tablet 5 mg, 5 mg, Oral, Nightly, Yeison Petty MD, 5 mg at 12/11/22 2114    melatonin tablet 5 mg, 5 mg, Oral, Nightly PRN, Yeison Petty MD    OLANZapine (zyPREXA) tablet 5 mg, 5 mg, Oral, BID, Yeison Petty MD, 5 mg at 12/12/22 0849    ondansetron (ZOFRAN) tablet 4 mg, 4 mg, Oral, Q6H PRN **OR** ondansetron (ZOFRAN) injection 4 mg, 4 mg, Intravenous, Q6H PRN, Yeison Petty MD    oxyCODONE (ROXICODONE) immediate release tablet 5 mg, 5 mg, Oral, Q4H PRN, Yeison Petty MD, 5 mg at 12/12/22 0336    Pharmacy to dose vancomycin, , Does not apply, Continuous PRN, Rayray Gordon MD    polyethylene glycol (MIRALAX) packet 17 g, 17 g, Oral, PRN, Yeison Petty MD, 17 g at 12/10/22 0932    QUEtiapine (SEROquel) tablet 12.5 mg, 12.5 mg, Oral, Daily, Tea Bentley PA-C, 12.5 mg at 12/12/22 0849    sennosides-docusate (PERICOLACE) 8.6-50 MG per tablet 2 tablet, 2 tablet, Oral, Nightly, Yeison Petty MD, 2 tablet at 12/11/22 2114    sodium chloride 0.9 % flush 10 mL, 10 mL, Intravenous, Q12H, Yeison Petty MD, 10 mL at 12/12/22 1016    sodium chloride 0.9 % flush 10 mL, 10 mL, Intravenous, PRN, Yeison Petty MD    sodium chloride 0.9 % flush 10 mL, 10 mL, Intravenous, Q12H, Rayray Gordon MD, 10 mL at 12/12/22 1016    sodium chloride  "0.9 % flush 10 mL, 10 mL, Intravenous, PRN, Rayray Gordon MD    sodium chloride 0.9 % infusion 40 mL, 40 mL, Intravenous, PRN, Yeison Petty MD    vancomycin in dextrose 5% 150 mL (VANCOCIN) IVPB 750 mg, 750 mg, Intravenous, Q12H, Preet Xiao, PharmD, 750 mg at 12/12/22 1014    Please refer to the medical record for a full medication list    Review of Systems:  Unable to get a reliable history as patient is responding with yes and no to basic questions and has a psychotic disorder.  Denies fever, chills, nausea, vomiting, diarrhea, shortness of breath, dysuria, or rashes.  Denies pain.    Physical Exam:   Vital Signs   Temp:  [97.9 °F (36.6 °C)-98.7 °F (37.1 °C)] 98.1 °F (36.7 °C)  Heart Rate:  [65-71] 66  Resp:  [18] 18  BP: (151-161)/(69-80) 161/80    Temp  Min: 97.9 °F (36.6 °C)  Max: 98.7 °F (37.1 °C)  BP  Min: 151/69  Max: 161/80  Pulse  Min: 65  Max: 71  Resp  Min: 18  Max: 18  SpO2  Min: 90 %  Max: 91 %    Blood pressure 161/80, pulse 66, temperature 98.1 °F (36.7 °C), resp. rate 18, height 182.9 cm (72\"), weight 81.6 kg (180 lb), SpO2 90 %.  GENERAL: Awake and alert, in Mild distress. Appears older than stated age.  Resting in bed.  Trying to eat dinner.  HEENT:  Normocephalic, atraumatic.  Oropharynx without thrush.   EYES: PERRLA.  No conjunctival injection. No icterus. EOM full.  LYMPHATICS: No lymphadenopathy of the neck or axillary or inguinal regions.   HEART: No murmur, gallop, or pericardial friction rub. Reg rate rhythm.  No JVD.  LUNGS: Clear to auscultation and percussion. No respiratory distress, no use of accessory muscles.  No rhonchi.  ABDOMEN: Soft, nontender, nondistended. No appreciable HSM.  Bowel sounds normal.  No masses.  SKIN: Warm and dry without cutaneous eruptions.   Left foot dressing in place, right arm PICC no purulence Or redness.    PSYCHIATRIC: Mental status with confusion.  But overall pleasant.  Follows commands.    EXT: Left foot surgical dressing in place.  No " crepitus, or foul smell.  NEURO: Oriented to name, nonfocal.  Follows some commands and pleasant with me.  Nonfocal.    Results Review:   I reviewed the patient's new clinical results.  I reviewed the patient's new imaging results and agree with the interpretation.  I reviewed the patient's other test results and agree with the interpretation    Results from last 7 days   Lab Units 12/10/22  1320 12/09/22  0613 12/07/22  0811   WBC 10*3/mm3 6.96 8.04 8.12   HEMOGLOBIN g/dL 8.3* 7.4* 7.7*   HEMATOCRIT % 25.6* 23.2* 23.9*   PLATELETS 10*3/mm3 290 272 248     Results from last 7 days   Lab Units 12/12/22  0841   SODIUM mmol/L 139   POTASSIUM mmol/L 4.2   CHLORIDE mmol/L 103   CO2 mmol/L 29.0   BUN mg/dL 19   CREATININE mg/dL 1.03   GLUCOSE mg/dL 184*   CALCIUM mg/dL 8.9     Results from last 7 days   Lab Units 12/10/22  1320   ALK PHOS U/L 85   BILIRUBIN mg/dL <0.2   ALT (SGPT) U/L 10   AST (SGOT) U/L 12         Results from last 7 days   Lab Units 12/07/22  0811   CRP mg/dL 0.95*     Results from last 7 days   Lab Units 12/12/22  0841 12/09/22  0614 12/07/22  0811   VANCOMYCIN RM mcg/mL 20.60 27.10 19.70         Estimated Creatinine Clearance: 82.5 mL/min (by C-G formula based on SCr of 1.03 mg/dL).  CPK      Common Labsle 12/7/22   Creatine Kinase 231 (A)   (A) Abnormal value               Procalitonin Results:       Brief Urine Lab Results  (Last result in the past 365 days)        Color   Clarity   Blood   Leuk Est   Nitrite   Protein   CREAT   Urine HCG        06/12/22 2056 Yellow   Clear     Negative                      No results found for: SITE, ALLENTEST, PHART, TUE8WDO, PO2ART, DZQ8DIF, BASEEXCESS, B8AVUYHR, HGBBG, HCTABG, OXYHEMOGLOBI, METHHGBN, CARBOXYHGB, CO2CT, BAROMETRIC, MODALITY, FIO2     Microbiology:  Microbiology Results (last 10 days)       Procedure Component Value - Date/Time    Anaerobic Culture - Tissue, Toe, Left [[id number]]  (Normal) Collected: 12/05/22 4116    Lab Status: Final result  Specimen: Tissue from Toe, Left Updated: 12/10/22 1148     Anaerobic Culture No anaerobes isolated at 5 days    Fungus Culture - Tissue, Toe, Left [[id number]] Collected: 12/05/22 1837    Lab Status: Preliminary result Specimen: Tissue from Toe, Left Updated: 12/10/22 1916     Fungus Culture No fungus isolated at less than 1 week    Tissue / Bone Culture - Tissue, Toe, Left [[id number]]  (Abnormal) Collected: 12/05/22 1837    Lab Status: Preliminary result Specimen: Tissue from Toe, Left Updated: 12/10/22 1029     Tissue Culture Heavy growth (4+) Yonria irenaae     Gram Stain Moderate (3+) WBCs seen      No organisms seen    Narrative:      Sending to [facility] for MICs  Kocuria are Sensitive to Doxycycline, Ceftriaxone, Cefuroxime, Amikacin, and Amoxicillin with Clavulanic Acid, but are usually Resistant to Ampicillin and Erythromycin.      AFB Culture - Tissue, Toe, Left [[id number]] Collected: 12/05/22 1837    Lab Status: Preliminary result Specimen: Tissue from Toe, Left Updated: 12/10/22 1916     AFB Culture No AFB isolated at less than 1 week     AFB Stain No acid fast bacilli seen on concentrated smear          Blood and wound cultures 10/14 pending.       No results found for: TISSCXQ, CULTURES, CULTURE, BLOODCX, ANACX, BALCX, ACIDFASTCX, BODYFLDCX, CXREFLEX, FUNGUSCX, RESPCX, MRSACX, ROUTCX, BRCHWSHCLT, TISSUECX, URINECX, CMVCX, WOUNDCX, BCIDPCR, BFCULTURE, BLOODCULT(      Radiology:  Imaging Results (Last 72 Hours)       Procedure Component Value Units Date/Time    XR Chest 1 View [[id number]] Collected: 12/10/22 0747     Updated: 12/10/22 0751    Narrative:         DATE OF EXAM:   12/10/2022 2:48 AM     PROCEDURE:   XR CHEST 1 VW-     INDICATIONS:   PICC placement confirmation; [id number] acute osteomyelitis, left  ankle and foot     COMPARISON:  04/29/2022 at 12:17 PM     TECHNIQUE:   [Portable chest radiograph]     FINDINGS:   Ill-defined infiltrates are noted throughout the mid to lower lungs  bilaterally  with associated diffuse interstitial changes. There are  likely small bilateral pleural effusions. The cardiac silhouette and  mediastinum are stable given differences in technique. A right PICC line  is noted with its tip positioned near the SVC/right atrial junction. The  line is in good position. No acute osseous abnormalities are seen.        Impression:      1.  The right PICC line distal tip is positioned near the SVC/right  atrial junction and is in good position. The line is ready for use.  2.  Ill-defined infiltrates are noted throughout the lungs bilaterally  with associated diffuse interstitial changes bilaterally. Small  bilateral pleural effusions are noted.     This report was finalized on 12/10/2022 7:48 AM by Omkar Canales MD.               IMPRESSION:   Left hallux osteomyelitis after trauma/displaced fracture 9/17/22.  Developed gangrene and poor wound healing related to vascular disease and noncompliance.  Improved after surgery 10/18 with ray resection but then had dehiscence of wound with exposed bone.  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  Cx with MRSA, Enterococcus faecalis, and Corynebacterium striatum.  Initially treated with Daptomycin and discharge on oral Zyvox and oral Rifampin until 10/26/22.  Cx with Raoultella ornithinolytica (sens to Cefepime, ceftriaxone) and Klebsiella.  Status post left first ray resection 10/18/2022.   Left foot wound dehiscence with exposed metatarsal new on 12/4, status post transmetatarsal amputation 12/5/2022.  Culture positive for Kocuria (new) kristinae, an uncommon pathogen.  Usually sensitive to vancomycin and cephalosporins.  Elevated inflammatory markers, related to above.  CRP 3.07 on 10/14.  CRP 3.10 on 10/26.  CRP 0.88 on 11/30.  Type 2 diabetes mellitus/peripheral neuropathy.  Affects wound healing.  Hyperglycemia, related to above.  Anemia of chronic disease.  Ongoing, and acute  postoperative blood loss.  Essential hypertension.    Gastroesophageal reflux disease.  Psychosis since April 2022 reported by the patient's sister, thought to be related to drug and alcohol use.  Was hospitalized previously at [facility].  Awaiting placement.  Has interfered with his care.  Leukocytosis, neutrophilic, resolved.  Hypocalcemia resolved.  CPK elevated 424 on 12/6, 231 on 12/7.    Plan:  Diagnostically, follow blood and wound cultures, imaging, physical examination, CBC, CMP, CRP, and cultures which were obtained at surgery on 12/5/2022.   Therapeutically, continue vancomycin and ceftriaxone with duration x6 weeks until 1/18/2021.   on 12/7.  Orthopedic surgery status post left first ray resection for osteomyelitis 10/18/2022, 12/5/2022.   Previously was a difficult placement.    Case management orders: Please arrange for skilled facility administration of antibiotics with vancomycin 1 g IV every 12 hours (keep trough between 12 and 18), ceftriaxone 2 g IV daily until 1/18/2023 for 6 weeks treatment of left foot osteomyelitis.  Check CBC, CMP, CRP, vancomycin trough weekly while on IV antibiotics.  Weekly PICC dressing changes.  Schedule follow-up with me in 2 weeks post discharge.    I discussed the patient's findings and my recommendations with patient and previously with his family.  He will need to be in a skilled facility to receive his antibiotics.      Our group would be pleased to follow this patient over the course of their hospitalization and assist with outpatient antimicrobial therapy, as indicated.  Side effects of medications discussed with nursing and previously with family.  Further recommendations depend on the results of the cultures and clinical course. Discussed with patient's sister and family.  Difficult issue with compliance in terms of wound care and staying off his foot.      Rayray Gordon MD  12/12/2022I

## 2022-12-12 NOTE — PROGRESS NOTES
Pharmacy Consult-Vancomycin Dosing  Omkar Nava is a  67 y.o. male receiving vancomycin therapy.     Indication: osteomyelitis  Consulting Provider: Dr Gordon  ID Consult: yes  Goal AUC: 400 - 600 mg/L*hr    Current Antimicrobial Therapy  Anti-Infectives (From admission, onward)      Ordered     Dose/Rate Route Frequency Start Stop    12/09/22 0830  vancomycin in dextrose 5% 150 mL (VANCOCIN) IVPB 750 mg        Ordering Provider: Preet Xiao, PharmD    750 mg  over 60 Minutes Intravenous Every 12 Hours Scheduled 12/09/22 2100 01/18/23 0859    12/08/22 1327  cefTRIAXone (ROCEPHIN) 2 g/100 mL 0.9% NS IVPB (MBP)        Ordering Provider: Rayray Gordon MD    2 g  over 30 Minutes Intravenous Every 24 Hours 12/08/22 2000 01/07/23 1959    12/06/22 1020  cefepime (MAXIPIME) 2 g/100 mL 0.9% NS (mbp)        Ordering Provider: Ijeoma Kuhn, PharmD    2 g  200 mL/hr over 30 Minutes Intravenous Once 12/06/22 1115 12/06/22 1307    12/06/22 1007  vancomycin 1750 mg/500 mL 0.9% NS IVPB ([facility])        Ordering Provider: Ijeoma Kuhn, PharmD    20 mg/kg × 81.6 kg  over 120 Minutes Intravenous Once 12/06/22 1100 12/06/22 1256    12/06/22 1022  Pharmacy to dose vancomycin        Ordering Provider: Rayray Gordon MD     Does not apply Continuous PRN 12/06/22 1021 01/17/23 1020    12/05/22 2055  ceFAZolin in dextrose (ANCEF) IVPB solution 2 g        Ordering Provider: Yeison Petty MD    2 g  over 30 Minutes Intravenous Every 8 Hours 12/06/22 0000 12/06/22 0744    12/05/22 1058  vancomycin 1250 mg/250 mL 0.9% NS IVPB ([facility])        Ordering Provider: Yeison Petty MD    15 mg/kg × 89.9 kg Intravenous Once 12/05/22 1100 12/05/22 1651          Allergies  Allergies as of 11/30/2022    (No Known Allergies)     Labs  Results from last 7 days   Lab Units 12/12/22  0841 12/10/22  1320 12/09/22  0614   BUN mg/dL 19 14 18   CREATININE mg/dL 1.03 1.16 0.89     Results from last 7 days   Lab Units 12/10/22  1320  "12/09/22  0613 12/07/22  0811   WBC 10*3/mm3 6.96 8.04 8.12     Evaluation of Dosing   Last Dose Received in the ED/Outside Facility: vancomycin 1250 mg IV x1 on 12/5 preop  Is Patient on Dialysis or Renal Replacement: no    Ht - 182.9 cm (72\")  Wt - 81.6 kg (180 lb)    Estimated Creatinine Clearance: 82.5 mL/min (by C-G formula based on SCr of 1.03 mg/dL).    Intake & Output (last 3 days)         12/09 0701  12/10 0700 12/10 0701  12/11 0700 12/11 0701 12/12 0700 12/12 0701 12/13 0700    P.O. 1080 840 840     IV Piggyback 250 250      Total Intake(mL/kg) 1330 (16.3) 1090 (13.4) 840 (10.3)     Urine (mL/kg/hr) 1350 (0.7) 2200 (1.1) 1400 (0.7)     Stool  0      Total Output 1350 2200 1400     Net -20 -1110 -560             Urine Unmeasured Occurrence 2 x  2 x     Stool Unmeasured Occurrence  2 x            Microbiology and Radiology  Microbiology Results (last 10 days)       Procedure Component Value - Date/Time    Anaerobic Culture - Tissue, Toe, Left [[id number]]  (Normal) Collected: 12/05/22 1837    Lab Status: Final result Specimen: Tissue from Toe, Left Updated: 12/10/22 1148     Anaerobic Culture No anaerobes isolated at 5 days    Fungus Culture - Tissue, Toe, Left [[id number]] Collected: 12/05/22 1837    Lab Status: Preliminary result Specimen: Tissue from Toe, Left Updated: 12/10/22 1916     Fungus Culture No fungus isolated at less than 1 week    Tissue / Bone Culture - Tissue, Toe, Left [[id number]]  (Abnormal) Collected: 12/05/22 1837    Lab Status: Preliminary result Specimen: Tissue from Toe, Left Updated: 12/10/22 1029     Tissue Culture Heavy growth (4+) Kocuria kristinae     Gram Stain Moderate (3+) WBCs seen      No organisms seen    Narrative:      Sending to [facility] for MICs  Kocuria are Sensitive to Doxycycline, Ceftriaxone, Cefuroxime, Amikacin, and Amoxicillin with Clavulanic Acid, but are usually Resistant to Ampicillin and Erythromycin.      AFB Culture - Tissue, Toe, Left [[id number]] Collected: " 12/05/22 1837    Lab Status: Preliminary result Specimen: Tissue from Toe, Left Updated: 12/10/22 1916     AFB Culture No AFB isolated at less than 1 week     AFB Stain No acid fast bacilli seen on concentrated smear          Reported Vancomycin Levels  Results from last 7 days   Lab Units 12/12/22  0841 12/09/22  0614 12/07/22  0811   VANCOMYCIN RM mcg/mL 20.60 27.10 19.70      [facility] AUC Calculation:    Current AUC: 535 mg/L*hr    Predicted Steady State AUC on Current Dose: 547 mg/L*hr  _________________________________    Predicted Steady State AUC on New Dose:  mg/L*hr    Assessment/Plan:    1. Pharmacy to dose vancomycin for osteomyelitis. Goal  to 600 mg/L*hr  2. Currently receiving vancomycin 750 mg IV q12h (~ 12.3 mg/kg).  Dose had been reduced on 12/9      Previously contacted Rehab facility, [facility], reported that he had remained on vancomycin 1 gm iv q12 through 11/25.  His vancomycin level prior to dose on 11/22 was 12 per Yuliet.  3. Patient's SCr is at 1.03 today- within stable range it has been throughout admission (~0.9-1.1 mg/dL), UOP with unmeasured occurrences.   4. Vancomycin level (~ 11 hrs post dose) was 20.6 today at 0841.       After this morning's dose, will change to vancomycin 1250 mg IV q24h (~15.3 mg/kg) for predicted AUCss 477 mg/L*hr, Ctr@ss 14 mg/L      Recheck vancomycin level on 12/15 to continue to evaluate clearance      Per Dr Gordon, planning 6 weeks of IV antibiotics.  Culture Yonria tessie noted; ID aware, keep trough between 12 - 18 mg/L per MD  5. Monitor renal function, cultures and sensitivities, and clinical status, and adjust regimen as necessary.  Pharmacy will continue to follow.    Ijeoma Kuhn, PharmD  12/12/2022  10:14 EST

## 2022-12-12 NOTE — PLAN OF CARE
Goal Outcome Evaluation:      VSS, patient rested comfortably throughout the shift. [facility], where the patient came from, came today to see the patient and speak to the healthcare team. Awaiting word from facility if they will accept back. They said they will inform us Tuesday morning. If accepted, ambulance is scheduled for 12 pm to transport the patient.

## 2022-12-12 NOTE — PROGRESS NOTES
[facility]  PROGRESS NOTE    Patient Name: Omkar Nava  : 1955  MRN: [id number]    Date of Admission: 2022  Primary Care Physician: Provider, No Known    Subjective   Subjective     CC:  F/U TMA L foot, osteomyelitis    HPI:   Pt seen resting in bed  No acute events overnight     ROS:      Objective   Objective     Vital Signs:   Temp:  [97.9 °F (36.6 °C)-98.7 °F (37.1 °C)] 98.1 °F (36.7 °C)  Heart Rate:  [65-71] 66  Resp:  [18] 18  BP: (151-161)/(69-80) 161/80  Flow (L/min):  [2] 2     Physical Exam:  Constitutional: No acute distress, resting in bed   HENT: NCAT, mucous membranes moist  Respiratory:  respiratory effort normal   Cardiovascular: RRR, no murmurs, rubs, or gallops  Gastrointestinal:  nondistended  Musculoskeletal: LL wrapped   Skin: No rashes          Results Reviewed:  LAB RESULTS:      Lab 12/10/22  1320 22  0613 22  0811 22  0503 22  1138   WBC 6.96 8.04 8.12 11.88* 8.63   HEMOGLOBIN 8.3* 7.4* 7.7* 8.2* 9.8*   HEMATOCRIT 25.6* 23.2* 23.9* 25.0* 30.4*   PLATELETS 290 272 248 287 333   NEUTROS ABS 4.42 5.39 5.21  --   --    IMMATURE GRANS (ABS) 0.02 0.02 0.02  --   --    LYMPHS ABS 1.68 1.66 1.95  --   --    MONOS ABS 0.55 0.70 0.67  --   --    EOS ABS 0.27 0.25 0.24  --   --    MCV 86.8 86.6 87.5 86.2 86.6   CRP  --   --  0.95*  --   --          Lab 12/10/22  1320 22  0614 22  0811 22  0701 22  1138   SODIUM 141 138 143 139 141   POTASSIUM 3.8 4.3 4.2 4.4 4.3   CHLORIDE 105 104 110* 103 105   CO2 29.0 28.0 28.0 26.0 28.0   ANION GAP 7.0 6.0 5.0 10.0 8.0   BUN 14 18 15 20 22   CREATININE 1.16 0.89 0.95 1.03 1.03   EGFR 69.9 95.1 88.8 80.6 80.6   GLUCOSE 196* 237* 112* 273* 167*   CALCIUM 9.1 8.7 8.4* 8.9 9.8         Lab 12/10/22  1320 22  0614 22  0811   TOTAL PROTEIN 6.7 5.6* 5.6*   ALBUMIN 3.10* 2.80* 2.80*   GLOBULIN 3.6 2.8 2.8   ALT (SGPT) 10 12 12   AST (SGOT) 12 13 13    BILIRUBIN <0.2 <0.2 <0.2   ALK PHOS 85 92 73                     Brief Urine Lab Results  (Last result in the past 365 days)      Color   Clarity   Blood   Leuk Est   Nitrite   Protein   CREAT   Urine HCG        06/12/22 2056 Yellow   Clear     Negative                     Microbiology Results Abnormal     Procedure Component Value - Date/Time    Fungus Culture - Tissue, Toe, Left [[id number]] Collected: 12/05/22 1837    Lab Status: Preliminary result Specimen: Tissue from Toe, Left Updated: 12/10/22 1916     Fungus Culture No fungus isolated at less than 1 week    AFB Culture - Tissue, Toe, Left [[id number]] Collected: 12/05/22 1837    Lab Status: Preliminary result Specimen: Tissue from Toe, Left Updated: 12/10/22 1916     AFB Culture No AFB isolated at less than 1 week     AFB Stain No acid fast bacilli seen on concentrated smear    Anaerobic Culture - Tissue, Toe, Left [[id number]]  (Normal) Collected: 12/05/22 1837    Lab Status: Final result Specimen: Tissue from Toe, Left Updated: 12/10/22 1148     Anaerobic Culture No anaerobes isolated at 5 days          No radiology results from the last 24 hrs        I have reviewed the medications:  Scheduled Meds:Pharmacy Consult, , Does not apply, Once  amLODIPine, 10 mg, Oral, Q24H  apixaban, 2.5 mg, Oral, BID  cefTRIAXone, 2 g, Intravenous, Q24H  hydrALAZINE, 25 mg, Oral, Q8H  insulin detemir, 15 Units, Subcutaneous, Nightly  insulin lispro, 0-7 Units, Subcutaneous, TID AC  lisinopril, 40 mg, Oral, Daily  melatonin, 5 mg, Oral, Nightly  OLANZapine, 5 mg, Oral, BID  QUEtiapine, 12.5 mg, Oral, Daily  senna-docusate sodium, 2 tablet, Oral, Nightly  sodium chloride, 10 mL, Intravenous, Q12H  sodium chloride, 10 mL, Intravenous, Q12H  vancomycin, 750 mg, Intravenous, Q12H      Continuous Infusions:lactated ringers, 9 mL/hr, Last Rate: Stopped (12/08/22 1603)  Pharmacy to dose vancomycin,       PRN Meds:.•  acetaminophen **OR** acetaminophen  •  bisacodyl  •  dextrose  •   dextrose  •  glucagon (human recombinant)  •  haloperidol  •  HYDROmorphone **AND** naloxone  •  LORazepam  •  melatonin  •  ondansetron **OR** ondansetron  •  oxyCODONE  •  Pharmacy to dose vancomycin  •  polyethylene glycol  •  sodium chloride  •  sodium chloride  •  sodium chloride    Assessment & Plan   Assessment & Plan     Active Hospital Problems    Diagnosis  POA   • **Acute osteomyelitis of left foot (HCC) [M86.172]  Yes   • Neurocognitive disorder [R41.9]  Yes   • Type 2 diabetes mellitus (HCC) [E11.9]  Yes   • Essential hypertension [I10]  Yes      Resolved Hospital Problems   No resolved problems to display.        Brief Hospital Course to date:  Omkar Nava is a 67 y.o. male with a history of dementia, T2DM, HTN, and polysubstance abuse who was admitted here on 12/5 for planned transmetatarsal amputation of the left foot due to osteomyelitis.    In brief:  L hallux severe OM and forefoot infection had ray amputation 10/18, wound then dehisced, cultures positive for Kocuria.      This patient's problems and plans were partially entered by my partner and updated as appropriate by me 12/12/22.       Osteomyelitis of left foot  S/p Left Transmetatarsal amputation  -Left foot TMA on 12/5 by orthopedics, Dr Petty  - ID following, continue antibiotics: Vancomycin and Cefepime until 1/18/2023. Patient to follow-up with Dr Gordon and Dr Petty 2 weeks post discharge.  -s/p PICC placement on 12/9  -PT/OT, plan is back to long-term care.        HTN  -Continue current mediations      T2DM  -continue levemir and SSI     Dementia with psychosis   -zyprexa and seroquel, currently   -prn Haldol and Ativan for agitation    Chronic Anemia  -Monitor H&H intermittently     Disposition:  The patient is currently medically ready for discharge back to the long term care facility.      Expected Discharge Location and Transportation: Back to long-term care via transportation   Expected Discharge Date: 12/13  DVT  prophylaxis:  Medical and mechanical DVT prophylaxis orders are present.     AM-PAC 6 Clicks Score (PT): 10 (12/11/22 0924)    CODE STATUS:   Code Status and Medical Interventions:   Ordered at: 12/10/22 0903     Code Status (Patient has no pulse and is not breathing):    CPR (Attempt to Resuscitate)     Medical Interventions (Patient has pulse or is breathing):    Full Support     Release to patient:    Routine Release       Karlee Dela Cruz,   12/12/22

## 2022-12-12 NOTE — PROGRESS NOTES
Clinical Nutrition     Patient Name: Omkar Nava  YOB: 1955  MRN: [id number]  Date of Encounter: 22 11:24 EST  Admission date: 2022    Reason for Visit   LOS    EMR reviewed    Yes    Diet Nutrition Related History     Patient's intake is 100% of his last 10 recorded meals and his last recorded BM was on 12/10/22.    Current Nutrition Prescription    Diet: Regular/House Diet, Diabetic Diets; Consistent Carbohydrate; Texture: Regular Texture (IDDSI 7); Fluid Consistency: Thin (IDDSI 0)    Average Intake from [facility]% x 10    Actions:    Follow treatment progress, Care plan reviewed    Monitor Per Protocol    Lindsay Bang,   Time Spent: 5 minutes

## 2022-12-12 NOTE — PLAN OF CARE
Goal Outcome Evaluation:  Plan of Care Reviewed With: family           Outcome Evaluation: vss; patient remained calm; resting well

## 2022-12-12 NOTE — CASE MANAGEMENT/SOCIAL WORK
Continued Stay Note  [facility]     Patient Name: Omkar Nava  MRN: [id number]  Today's Date: 12/12/2022    Admit Date: 12/5/2022    Plan: Social work called Idalia Lerma the patients daughter and  power of  and explained that [facility] will have a final decision about acceptance on Tuesday morning and the ambulance is set up for 12 pm on Tuesday 12/13/2022.   Discharge Plan     Row Name 12/12/22 1646       Plan    Plan Social work called Idalia Lerma the patients daughter and  power of  and explained that [facility] will have a final decision about acceptance on Tuesday morning and the ambulance is set up for 12 pm on Tuesday 12/13/2022.               Discharge Codes    No documentation.               Expected Discharge Date and Time     Expected Discharge Date Expected Discharge Time    Dec 13, 2022             LUCINDA Markham

## 2022-12-13 VITALS
WEIGHT: 180 LBS | TEMPERATURE: 97.9 F | HEIGHT: 72 IN | SYSTOLIC BLOOD PRESSURE: 131 MMHG | OXYGEN SATURATION: 93 % | RESPIRATION RATE: 16 BRPM | HEART RATE: 63 BPM | DIASTOLIC BLOOD PRESSURE: 65 MMHG | BODY MASS INDEX: 24.38 KG/M2

## 2022-12-13 LAB
FLUAV RNA RESP QL NAA+PROBE: NOT DETECTED
FLUBV RNA RESP QL NAA+PROBE: NOT DETECTED
GLUCOSE BLDC GLUCOMTR-MCNC: 134 MG/DL (ref 70–130)
GLUCOSE BLDC GLUCOMTR-MCNC: 78 MG/DL (ref 70–130)
SARS-COV-2 RNA RESP QL NAA+PROBE: NOT DETECTED

## 2022-12-13 PROCEDURE — C9803 HOPD COVID-19 SPEC COLLECT: HCPCS | Performed by: FAMILY MEDICINE

## 2022-12-13 PROCEDURE — 99239 HOSP IP/OBS DSCHRG MGMT >30: CPT | Performed by: FAMILY MEDICINE

## 2022-12-13 PROCEDURE — 87636 SARSCOV2 & INF A&B AMP PRB: CPT | Performed by: FAMILY MEDICINE

## 2022-12-13 PROCEDURE — 82962 GLUCOSE BLOOD TEST: CPT

## 2022-12-13 PROCEDURE — 25010000002 VANCOMYCIN 10 G RECONSTITUTED SOLUTION

## 2022-12-13 RX ORDER — QUETIAPINE FUMARATE 25 MG/1
12.5 TABLET, FILM COATED ORAL DAILY
Qty: 30 TABLET | Refills: 0 | Status: SHIPPED | OUTPATIENT
Start: 2022-12-13

## 2022-12-13 RX ORDER — OLANZAPINE 5 MG/1
5 TABLET ORAL 2 TIMES DAILY
Qty: 60 TABLET | Refills: 0 | Status: SHIPPED | OUTPATIENT
Start: 2022-12-13

## 2022-12-13 RX ORDER — HYDRALAZINE HYDROCHLORIDE 25 MG/1
25 TABLET, FILM COATED ORAL EVERY 8 HOURS SCHEDULED
Qty: 90 TABLET | Refills: 0 | Status: SHIPPED | OUTPATIENT
Start: 2022-12-13

## 2022-12-13 RX ADMIN — VANCOMYCIN HYDROCHLORIDE 1250 MG: 10 INJECTION, POWDER, LYOPHILIZED, FOR SOLUTION INTRAVENOUS at 05:43

## 2022-12-13 RX ADMIN — OLANZAPINE 5 MG: 5 TABLET, FILM COATED ORAL at 08:50

## 2022-12-13 RX ADMIN — Medication 10 ML: at 08:50

## 2022-12-13 RX ADMIN — AMLODIPINE BESYLATE 10 MG: 10 TABLET ORAL at 08:50

## 2022-12-13 RX ADMIN — APIXABAN 2.5 MG: 2.5 TABLET, FILM COATED ORAL at 08:50

## 2022-12-13 RX ADMIN — QUETIAPINE FUMARATE 12.5 MG: 25 TABLET ORAL at 08:50

## 2022-12-13 NOTE — PLAN OF CARE
Goal Outcome Evaluation:  Plan of Care Reviewed With: family        Progress: no change  Outcome Evaluation: vss; pt rested well with periods of confusion; soft cast remains intact

## 2022-12-13 NOTE — CASE MANAGEMENT/SOCIAL WORK
Continued Stay Note  [facility]     Patient Name: Omkar Nava  MRN: [id number]  Today's Date: 12/13/2022    Admit Date: 12/5/2022  The nurse report number is [phone] and the discharge summary fax number is [phone]. The pharmacy the facility uses is [facility].      Discharge Plan     Row Name 12/13/22 1102       Plan    Plan The nurse report number is [phone] and the discharge summary fax number is [phone]. The pharmacy the facility uses is [facility] in [facility].               Discharge Codes    No documentation.               Expected Discharge Date and Time     Expected Discharge Date Expected Discharge Time    Dec 13, 2022             LUCINDA Markham

## 2022-12-13 NOTE — DISCHARGE SUMMARY
[facility] Medicine Services  TRANSFER SUMMARY    Patient Name: Omkar Nava  : 1955  MRN: [id number]    Date of Admission: 2022  Date of Discharge:  22  Length of Stay: 8  Primary Care Physician: Provider, No Known    Consults     Date and Time Order Name Status Description    2022 12:27 AM Inpatient Hospitalist Consult Completed     2022  8:55 PM Inpatient Infectious Diseases Consult Completed     2022  8:55 PM Inpatient Hospitalist Consult      10/15/2022 12:34 AM Inpatient Infectious Diseases Consult Completed     10/15/2022 12:34 AM Inpatient Orthopedic Surgery Consult Completed           Hospital Course           Omkar Nava is a 67 y.o. male with a history of dementia, T2DM, HTN, and polysubstance abuse who was admitted here on  for planned transmetatarsal amputation of the left foot due to osteomyelitis.     In brief:  L hallux severe OM and forefoot infection had ray amputation 10/18, wound then dehisced, cultures positive for Kocuria.             Osteomyelitis of left foot  S/p Left Transmetatarsal amputation  -Left foot TMA on  by orthopedics, Dr Burgos  - ID following, continue antibiotics: Vancomycin and Cefepime until 2023. Patient to follow-up with Dr Gordon and Dr Burgos 2 weeks post discharge.  -s/p PICC placement on          HTN  -Continue current mediations      T2DM  -continue levemir and SSI       For dementia with psychosis, oral seroquel and zyprexa. As needed ativan can be given.        Chronic Anemia  -Monitor H&H intermittently          Discharge Follow Up Recommendations for outpatient labs/diagnostics:   pcp  ID  Dr burgos    Day of Discharge     HPI:   Pt without complaints     Review of Systems  Unable to assess     Vital Signs:   Temp:  [97.8 °F (36.6 °C)-97.9 °F (36.6 °C)] 97.9 °F (36.6 °C)  Heart Rate:  [63-85] 63  Resp:  [16-18] 16  BP: (131-136)/(65-70) 131/65     Physical Exam:  Constitutional: No  acute distress, awake, alert  HENT: NCAT, mucous membranes moist  Respiratory: , respiratory effort normal   Cardiovascular: RRR, no murmurs, rubs, or gallops  Gastrointestinal:, nondistended  Musculoskeletal: No bilateral ankle edema, LE wrapped   Psychiatric: Appropriate affect, cooperative  Neurologic: Oriented  To self  speech clear  Skin: No rashes      Pertinent Results     Results from last 7 days   Lab Units 12/12/22  0841 12/10/22  1320 12/09/22  0614 12/09/22  0613 12/07/22  0811   WBC 10*3/mm3  --  6.96  --  8.04 8.12   HEMOGLOBIN g/dL  --  8.3*  --  7.4* 7.7*   HEMATOCRIT %  --  25.6*  --  23.2* 23.9*   PLATELETS 10*3/mm3  --  290  --  272 248   SODIUM mmol/L 139 141 138  --  143   POTASSIUM mmol/L 4.2 3.8 4.3  --  4.2   CHLORIDE mmol/L 103 105 104  --  110*   CO2 mmol/L 29.0 29.0 28.0  --  28.0   BUN mg/dL 19 14 18  --  15   CREATININE mg/dL 1.03 1.16 0.89  --  0.95   GLUCOSE mg/dL 184* 196* 237*  --  112*   CALCIUM mg/dL 8.9 9.1 8.7  --  8.4*     Results from last 7 days   Lab Units 12/10/22  1320 12/09/22  0614 12/07/22  0811   BILIRUBIN mg/dL <0.2 <0.2 <0.2   ALK PHOS U/L 85 92 73   ALT (SGPT) U/L 10 12 12   AST (SGOT) U/L 12 13 13           Invalid input(s): TG, LDLCALC, LDLREALC      Brief Urine Lab Results  (Last result in the past 365 days)      Color   Clarity   Blood   Leuk Est   Nitrite   Protein   CREAT   Urine HCG        06/12/22 2056 Yellow   Clear     Negative                     Microbiology Results Abnormal     Procedure Component Value - Date/Time    Fungus Culture - Tissue, Toe, Left [[id number]] Collected: 12/05/22 1837    Lab Status: Preliminary result Specimen: Tissue from Toe, Left Updated: 12/12/22 1915     Fungus Culture No fungus isolated at 1 week    AFB Culture - Tissue, Toe, Left [[id number]] Collected: 12/05/22 1837    Lab Status: Preliminary result Specimen: Tissue from Toe, Left Updated: 12/12/22 1915     AFB Culture No AFB isolated at 1 week     AFB Stain No acid fast  bacilli seen on concentrated smear    Anaerobic Culture - Tissue, Toe, Left [[id number]]  (Normal) Collected: 12/05/22 1837    Lab Status: Final result Specimen: Tissue from Toe, Left Updated: 12/10/22 1148     Anaerobic Culture No anaerobes isolated at 5 days          Imaging Results (All)     Procedure Component Value Units Date/Time    XR Chest 1 View [[id number]] Collected: 12/10/22 0747     Updated: 12/10/22 0751    Narrative:         DATE OF EXAM:   12/10/2022 2:48 AM     PROCEDURE:   XR CHEST 1 VW-     INDICATIONS:   PICC placement confirmation; [id number] acute osteomyelitis, left  ankle and foot     COMPARISON:  04/29/2022 at 12:17 PM     TECHNIQUE:   [Portable chest radiograph]     FINDINGS:   Ill-defined infiltrates are noted throughout the mid to lower lungs  bilaterally with associated diffuse interstitial changes. There are  likely small bilateral pleural effusions. The cardiac silhouette and  mediastinum are stable given differences in technique. A right PICC line  is noted with its tip positioned near the SVC/right atrial junction. The  line is in good position. No acute osseous abnormalities are seen.        Impression:      1.  The right PICC line distal tip is positioned near the SVC/right  atrial junction and is in good position. The line is ready for use.  2.  Ill-defined infiltrates are noted throughout the lungs bilaterally  with associated diffuse interstitial changes bilaterally. Small  bilateral pleural effusions are noted.     This report was finalized on 12/10/2022 7:48 AM by Omkar Canales MD.                 Pending Labs     Order Current Status    COVID PRE-OP / PRE-PROCEDURE SCREENING ORDER (NO ISOLATION) - Swab, Nasopharynx Collected (12/13/22 1124)    COVID-19 and FLU A/B PCR - Swab, Nasopharynx Collected (12/13/22 1124)    AFB Culture - Tissue, Toe, Left Preliminary result    Fungus Culture - Tissue, Toe, Left Preliminary result    Tissue / Bone Culture - Tissue, Toe, Left  Preliminary result          Discharge Details        Discharge Medications      New Medications      Instructions Start Date   cefTRIAXone  Commonly known as: ROCEPHIN   2 g, Intravenous, Every 24 Hours      hydrALAZINE 25 MG tablet  Commonly known as: APRESOLINE   25 mg, Oral, Every 8 Hours Scheduled      QUEtiapine 25 MG tablet  Commonly known as: SEROquel   12.5 mg, Oral, Daily      vancomycin   1,250 mg, Intravenous, Every 24 Hours Scheduled   Start Date: December 14, 2022        Changes to Medications      Instructions Start Date   melatonin 5 MG tablet tablet  What changed: how much to take   5 mg, Oral, Nightly      metFORMIN 1000 MG tablet  Commonly known as: GLUCOPHAGE  What changed: how much to take   1,000 mg, Oral, 2 Times Daily With Meals      OLANZapine 5 MG tablet  Commonly known as: zyPREXA  What changed: additional instructions   5 mg, Oral, 2 Times Daily         Continue These Medications      Instructions Start Date   acetaminophen 325 MG tablet  Commonly known as: TYLENOL   650 mg, Oral, Every 6 Hours PRN      amLODIPine 10 MG tablet  Commonly known as: NORVASC   10 mg, Oral, Daily      apixaban 2.5 MG tablet tablet  Commonly known as: ELIQUIS   2.5 mg, Oral, 2 Times Daily, VTE ppx      ascorbic acid 500 MG tablet  Commonly known as: VITAMIN C   500 mg, Oral, 2 Times Daily      D3 2000 PO   3 capsules, Oral, Every Morning      ferrous sulfate 325 (65 FE) MG tablet   325 mg, Oral, Daily With Breakfast      glipizide 5 MG tablet  Commonly known as: GLUCOTROL   2.5 mg, Oral, Daily      insulin glargine 100 UNIT/ML injection  Commonly known as: LANTUS, SEMGLEE   10 Units, Subcutaneous, Nightly      Insulin Lispro 100 UNIT/ML injection  Commonly known as: humaLOG   8 Units, Subcutaneous, 3 Times Daily With Meals      lisinopril 40 MG tablet  Commonly known as: PRINIVIL,ZESTRIL   40 mg, Oral, Daily      LORazepam 2 MG tablet  Commonly known as: ATIVAN   2 mg, Oral, Every 6 Hours PRN      oxyCODONE 5 MG  "immediate release tablet  Commonly known as: ROXICODONE   5 mg, Oral, Every 4 Hours PRN      thiamine 100 MG tablet  tablet  Commonly known as: VITAMIN B-1   200 mg, Oral, 3 Times Daily      zinc sulfate 50 MG capsule  Commonly known as: ZINCATE   220 mg, Oral, Daily         Stop These Medications    AQUAPHOR EX     haloperidol lactate 5 MG/ML injection  Commonly known as: HALDOL            No Known Allergies      Discharge Disposition:  Skilled Nursing Facility (DC - External)    Discharge Diet:  Diet Order   Procedures   • Diet: Regular/House Diet, Diabetic Diets; Consistent Carbohydrate; Texture: Regular Texture (IDDSI 7); Fluid Consistency: Thin (IDDSI 0)       Discharge Activity:  Activity Instructions     Activity as Tolerated              CODE STATUS:    Code Status and Medical Interventions:   Ordered at: 12/10/22 0947     Code Status (Patient has no pulse and is not breathing):    CPR (Attempt to Resuscitate)     Medical Interventions (Patient has pulse or is breathing):    Full Support     Release to patient:    Routine Release         Future Appointments   Date Time Provider Department Center   12/13/2022 12:00 PM [facility] 2  [facility]   12/19/2022 10:20 AM Yeison Petty MD [facility]       Additional Instructions for the Follow-ups that You Need to Schedule     Discharge Follow-up with PCP   As directed       Currently Documented PCP:    Provider, No Known    PCP Phone Number:    None     Follow Up Details: 1-2 week         Discharge Follow-up with Specified Provider: Jovanny   As directed      To: Jovanny    Follow Up Details: 2 to 3 weeks         Discharge Follow-up with Specified Provider: neurology; 1 Month   As directed      To: neurology    Follow Up: 1 Month                   if \"pending\" use the [facility] process    Time Spent on Discharge: I spent  35  minutes on this discharge activity which included: face-to-face encounter with the patient, reviewing the data in the system, coordination of the " care with the nursing staff as well as consultants, documentation, and entering orders.

## 2022-12-16 ENCOUNTER — TELEPHONE (OUTPATIENT)
Dept: ORTHOPEDIC SURGERY | Facility: CLINIC | Age: 67
End: 2022-12-16

## 2022-12-19 ENCOUNTER — OFFICE VISIT (OUTPATIENT)
Dept: ORTHOPEDIC SURGERY | Facility: CLINIC | Age: 67
End: 2022-12-19

## 2022-12-19 DIAGNOSIS — Z89.432 S/P TRANSMETATARSAL AMPUTATION OF FOOT, LEFT: Primary | ICD-10-CM

## 2022-12-19 PROCEDURE — 99024 POSTOP FOLLOW-UP VISIT: CPT | Performed by: ORTHOPAEDIC SURGERY

## 2022-12-19 RX ORDER — SENNOSIDES 8.6 MG
650 CAPSULE ORAL EVERY 8 HOURS PRN
COMMUNITY

## 2022-12-19 RX ORDER — NICOTINE POLACRILEX 4 MG
LOZENGE BUCCAL
COMMUNITY

## 2022-12-19 NOTE — PROGRESS NOTES
[facility] Orthopaedic Surgery Office Follow Up     Office Follow Up Visit     Date: 12/19/2022   Patient Name: Omkar Nava  MRN: [id number]  YOB: 1955  Chief Complaint:   Chief Complaint   Patient presents with   • Post-op     S/P Transmetatarsal amputation (2nd through 5th metatarsals) Left Foot-Osteomyelitis Left Foot 12/5/22     History of Present Illness:   Omkar Nava is a 67 y.o. male who is here today for follow up for left foot transmetatarsal amputation.  Has been nonweightbearing in splint.  Currently receiving antibiotics through PICC line.  Here with family providing history.  Has been staying at rehab facility where they have been monitoring him.  No new complaints.    Subjective   I reviewed the patient's chief complaint, history of present illness, review of systems, past medical history, surgical history, family history, social history, medications and allergy list   Objective    Vital Signs: There were no vitals filed for this visit.  There is no height or weight on file to calculate BMI.    Ortho Exam:  left LE: Dressing taken down for exam              Leg compartments soft/compressible              Skin at amp site WWP              Sutures in place, incision clean, dry and intact, no drainage, no erythema              CR brisk at skin edges of incision site     Results Review:  No new images     Assessment / Plan    Assessment/Plan:   Diagnoses and all orders for this visit:    1. S/P transmetatarsal amputation of foot, left (HCC) (Primary)      Here today for postoperative wound check.  Incision clean dry and intact with sutures in place.  No signs of infection.  Healing up well.  Will hold off on suture removal today.  Plan to see patient back in clinic in 2 weeks for wound recheck and plan suture removal.  Patient placed back in forefoot offloading shoe in clinic today.  Patient can weight-bear as tolerated with shoe in place.  Continue daily dressing  changes.  Okay to shower, no soaks or baths.    Follow Up:   Return in about 2 weeks (around 1/2/2023) for Recheck.      Yeison Petty MD  [facility] [place] Orthopedic Surgeon

## 2022-12-20 LAB
BACTERIA SPEC AEROBE CULT: ABNORMAL
GRAM STN SPEC: ABNORMAL
GRAM STN SPEC: ABNORMAL

## 2023-01-16 LAB
FUNGUS WND CULT: NORMAL
MYCOBACTERIUM SPEC CULT: NORMAL
NIGHT BLUE STAIN TISS: NORMAL

## 2023-01-23 ENCOUNTER — OFFICE VISIT (OUTPATIENT)
Dept: ORTHOPEDIC SURGERY | Facility: CLINIC | Age: 68
End: 2023-01-23
Payer: MEDICARE

## 2023-01-23 DIAGNOSIS — Z89.432 S/P TRANSMETATARSAL AMPUTATION OF FOOT, LEFT: Primary | ICD-10-CM

## 2023-01-23 PROCEDURE — 99024 POSTOP FOLLOW-UP VISIT: CPT | Performed by: ORTHOPAEDIC SURGERY

## 2023-01-23 RX ORDER — VANCOMYCIN HYDROCHLORIDE 10 G/100ML
INJECTION, POWDER, LYOPHILIZED, FOR SOLUTION INTRAVENOUS
COMMUNITY
Start: 2023-01-15

## 2023-01-23 RX ORDER — LANOLIN ALCOHOL/MO/W.PET/CERES
6 CREAM (GRAM) TOPICAL
COMMUNITY
Start: 2022-10-05 | End: 2023-01-23 | Stop reason: SDUPTHER

## 2023-01-23 RX ORDER — NYSTATIN 100000 [USP'U]/G
POWDER TOPICAL
COMMUNITY
Start: 2023-01-14

## 2023-01-23 RX ORDER — CEFTRIAXONE SODIUM 10 G/100ML
INJECTION, POWDER, FOR SOLUTION INTRAVENOUS
COMMUNITY
Start: 2023-01-16

## 2023-01-23 NOTE — PROGRESS NOTES
[facility] [place] Orthopaedic Surgery Office Follow Up     Office Follow Up Visit     Date: 01/23/2023   Patient Name: Omkar Nava  MRN: [id number]  YOB: 1955  Chief Complaint:   Chief Complaint   Patient presents with   • Post-op     5 week follow up -- 7 week S/P Transmetatarsal amputation (2nd through 5th metatarsals) Left Foot-Osteomyelitis Left Foot 12/5/22     History of Present Illness:   Omkar Nava is a 67 y.o. male who is here today for follow up following left foot transmetatarsal amputation.  Daughter with patient providing history.  Patient pleasantly demented but not following commands.  Daughter states has been in assisted living facility since last visit.  Sutures removed at the facility about a week ago.  States no new complaints with regard to the left foot.  Patient has been minimally ambulatory secondary to multiple comorbidities.  Still ambulating in heel weightbearing shoe when able on left foot.    Subjective   I reviewed the patient's chief complaint, history of present illness, review of systems, past medical history, surgical history, family history, social history, medications and allergy list   Objective    Vital Signs: There were no vitals filed for this visit.  There is no height or weight on file to calculate BMI.    Ortho Exam:  Ortho Exam:  left LE: Incision well-healed, no tenderness to palpation, no erythema, no drainage, skin intact              Leg compartments soft/compressible   Diffuse swelling about foot and leg              Skin at amp site WWP              CR brisk at skin edges of incision site     Results Review:  No new imaging    Assessment / Plan    Assessment/Plan:   Diagnoses and all orders for this visit:    1. S/P transmetatarsal amputation of foot, left (HCC) (Primary)      Patient doing well following transmetatarsal amputation.  Counseled patient and patient's daughter that patient can benefit from compression stockings to help  mobilize swelling.  Also provided patient with postop shoe which patient can use to wear for ambulation especially secondary to his swelling which seems to be diffuse and is likely due to his other comorbidities.  Of note per daughter patient was recently treated for pneumonia in the hospital.  Patient can return to activity as tolerated.  Plan to see patient back in 3 months for reevaluation. Encouraged patient's daughter to continue to keep an eye on his surgical site and if anything new or concerning arises to return to clinic sooner if necessary.     Follow Up:   Return in about 3 months (around 4/23/2023) for Recheck.      Yeison Petty MD  [facility] [place] [facility] Orthopedic Surgeon

## 2023-01-23 NOTE — PROGRESS NOTES
[facility] [place] [place] Orthopaedic Surgery Office Follow Up     Office Follow Up Visit     Date: 01/23/2023   Patient Name: Omkar Nava  MRN: [id number]  YOB: 1955  Chief Complaint:   Chief Complaint   Patient presents with   • Post-op     5 week follow up -- 7 week S/P Transmetatarsal amputation (2nd through 5th metatarsals) Left Foot-Osteomyelitis Left Foot 12/5/22     History of Present Illness:   Omkar Nava is a 67 y.o. male who is here today for follow up following left foot transmetatarsal amputation.  Daughter with patient providing history.  Patient pleasantly demented but not following commands.  Daughter states has been in assisted living facility since last visit.  Sutures removed at the facility about a week ago.  States no new complaints with regard to the left foot.  Patient has been minimally ambulatory secondary to multiple comorbidities.  Still ambulating in heel weightbearing shoe when able on left foot.    Subjective   I reviewed the patient's chief complaint, history of present illness, review of systems, past medical history, surgical history, family history, social history, medications and allergy list   Objective    Vital Signs: There were no vitals filed for this visit.  There is no height or weight on file to calculate BMI.    Ortho Exam:  Ortho Exam:  left LE: Incision well-healed, no tenderness to palpation, no erythema, no drainage, skin intact              Leg compartments soft/compressible   Diffuse swelling about foot and leg              Skin at amp site WWP              CR brisk at skin edges of incision site     Results Review:  No new imaging    Assessment / Plan    Assessment/Plan:   Diagnoses and all orders for this visit:    1. S/P transmetatarsal amputation of foot, left (HCC) (Primary)      Patient doing well following transmetatarsal amputation.  Counseled patient and patient's daughter that patient can benefit from compression stockings to help  mobilize swelling.  Also provided patient with postop shoe which patient can use to wear for ambulation especially secondary to his swelling which seems to be diffuse and is likely due to his other comorbidities.  Of note per daughter patient was recently treated for pneumonia in the hospital.  Patient can return to activity as tolerated.  Encouraged patient's daughter to continue to keep an eye on his surgical site and if anything new or concerning arises to return to clinic.  Plan to see patient back in 3 months for reevaluation.    Follow Up:   No follow-ups on file.      Yeison Petty MD  [facility] [place] Orthopedic Surgeon

## 2023-03-06 ENCOUNTER — TRANSCRIBE ORDERS (OUTPATIENT)
Dept: LAB | Facility: HOSPITAL | Age: 68
End: 2023-03-06
Payer: MEDICARE

## 2023-03-06 ENCOUNTER — LAB (OUTPATIENT)
Dept: LAB | Facility: HOSPITAL | Age: 68
End: 2023-03-06
Payer: MEDICARE

## 2023-03-06 DIAGNOSIS — M86.172 ACUTE OSTEOMYELITIS OF LEFT ANKLE OR FOOT: ICD-10-CM

## 2023-03-06 DIAGNOSIS — L03.032 ABSCESS OF FIFTH TOENAIL OF LEFT FOOT: ICD-10-CM

## 2023-03-06 DIAGNOSIS — M86.172 ACUTE OSTEOMYELITIS OF LEFT ANKLE OR FOOT: Primary | ICD-10-CM

## 2023-03-06 DIAGNOSIS — E11.42 DIABETIC POLYNEUROPATHY ASSOCIATED WITH TYPE 2 DIABETES MELLITUS: ICD-10-CM

## 2023-03-06 DIAGNOSIS — S91.312D LACERATION WITHOUT FOREIGN BODY, LEFT FOOT, SUBSEQUENT ENCOUNTER: ICD-10-CM

## 2023-03-06 LAB
BASOPHILS # BLD AUTO: 0.03 10*3/MM3 (ref 0–0.2)
BASOPHILS NFR BLD AUTO: 0.4 % (ref 0–1.5)
CRP SERPL-MCNC: 0.3 MG/DL (ref 0–0.5)
DEPRECATED RDW RBC AUTO: 44.5 FL (ref 37–54)
EOSINOPHIL # BLD AUTO: 0.23 10*3/MM3 (ref 0–0.4)
EOSINOPHIL NFR BLD AUTO: 3 % (ref 0.3–6.2)
ERYTHROCYTE [DISTWIDTH] IN BLOOD BY AUTOMATED COUNT: 13.9 % (ref 12.3–15.4)
HCT VFR BLD AUTO: 30.8 % (ref 37.5–51)
HGB BLD-MCNC: 10.1 G/DL (ref 13–17.7)
IMM GRANULOCYTES # BLD AUTO: 0.01 10*3/MM3 (ref 0–0.05)
IMM GRANULOCYTES NFR BLD AUTO: 0.1 % (ref 0–0.5)
LYMPHOCYTES # BLD AUTO: 1.89 10*3/MM3 (ref 0.7–3.1)
LYMPHOCYTES NFR BLD AUTO: 25 % (ref 19.6–45.3)
MCH RBC QN AUTO: 28.5 PG (ref 26.6–33)
MCHC RBC AUTO-ENTMCNC: 32.8 G/DL (ref 31.5–35.7)
MCV RBC AUTO: 86.8 FL (ref 79–97)
MONOCYTES # BLD AUTO: 0.55 10*3/MM3 (ref 0.1–0.9)
MONOCYTES NFR BLD AUTO: 7.3 % (ref 5–12)
NEUTROPHILS NFR BLD AUTO: 4.85 10*3/MM3 (ref 1.7–7)
NEUTROPHILS NFR BLD AUTO: 64.2 % (ref 42.7–76)
NRBC BLD AUTO-RTO: 0 /100 WBC (ref 0–0.2)
PLATELET # BLD AUTO: 255 10*3/MM3 (ref 140–450)
PMV BLD AUTO: 11 FL (ref 6–12)
RBC # BLD AUTO: 3.55 10*6/MM3 (ref 4.14–5.8)
WBC NRBC COR # BLD: 7.56 10*3/MM3 (ref 3.4–10.8)

## 2023-03-06 PROCEDURE — 86140 C-REACTIVE PROTEIN: CPT

## 2023-03-06 PROCEDURE — 85025 COMPLETE CBC W/AUTO DIFF WBC: CPT

## 2023-03-06 PROCEDURE — 36415 COLL VENOUS BLD VENIPUNCTURE: CPT

## 2023-07-31 ENCOUNTER — APPOINTMENT (OUTPATIENT)
Dept: GENERAL RADIOLOGY | Facility: HOSPITAL | Age: 68
DRG: 871 | End: 2023-07-31
Payer: MEDICARE

## 2023-07-31 ENCOUNTER — APPOINTMENT (OUTPATIENT)
Dept: CT IMAGING | Facility: HOSPITAL | Age: 68
DRG: 871 | End: 2023-07-31
Payer: MEDICARE

## 2023-07-31 ENCOUNTER — HOSPITAL ENCOUNTER (INPATIENT)
Facility: HOSPITAL | Age: 68
LOS: 9 days | Discharge: NURSING, REHAB OR LONG-TERM CARE | DRG: 871 | End: 2023-08-09
Attending: EMERGENCY MEDICINE | Admitting: INTERNAL MEDICINE
Payer: MEDICARE

## 2023-07-31 DIAGNOSIS — R50.9 FEBRILE ILLNESS: Primary | ICD-10-CM

## 2023-07-31 DIAGNOSIS — R41.82 ALTERED MENTAL STATUS, UNSPECIFIED ALTERED MENTAL STATUS TYPE: ICD-10-CM

## 2023-07-31 LAB
ALBUMIN SERPL-MCNC: 3.8 G/DL (ref 3.5–5.2)
ALBUMIN/GLOB SERPL: 1 G/DL
ALP SERPL-CCNC: 75 U/L (ref 39–117)
ALT SERPL W P-5'-P-CCNC: 25 U/L (ref 1–41)
AMPHET+METHAMPHET UR QL: NEGATIVE
AMPHETAMINES UR QL: NEGATIVE
ANION GAP SERPL CALCULATED.3IONS-SCNC: 13 MMOL/L (ref 5–15)
APAP SERPL-MCNC: <5 MCG/ML (ref 0–30)
AST SERPL-CCNC: 25 U/L (ref 1–40)
B PARAPERT DNA SPEC QL NAA+PROBE: NOT DETECTED
B PERT DNA SPEC QL NAA+PROBE: NOT DETECTED
BACTERIA UR QL AUTO: ABNORMAL /HPF
BARBITURATES UR QL SCN: NEGATIVE
BASOPHILS # BLD AUTO: 0.03 10*3/MM3 (ref 0–0.2)
BASOPHILS NFR BLD AUTO: 0.3 % (ref 0–1.5)
BENZODIAZ UR QL SCN: POSITIVE
BILIRUB SERPL-MCNC: <0.2 MG/DL (ref 0–1.2)
BILIRUB UR QL STRIP: NEGATIVE
BUN SERPL-MCNC: 22 MG/DL (ref 8–23)
BUN/CREAT SERPL: 17.9 (ref 7–25)
BUPRENORPHINE SERPL-MCNC: NEGATIVE NG/ML
C PNEUM DNA NPH QL NAA+NON-PROBE: NOT DETECTED
CALCIUM SPEC-SCNC: 9.3 MG/DL (ref 8.6–10.5)
CANNABINOIDS SERPL QL: NEGATIVE
CHLORIDE SERPL-SCNC: 103 MMOL/L (ref 98–107)
CLARITY UR: CLEAR
CO2 SERPL-SCNC: 22 MMOL/L (ref 22–29)
COCAINE UR QL: NEGATIVE
COLOR UR: YELLOW
CREAT SERPL-MCNC: 1.23 MG/DL (ref 0.76–1.27)
D-LACTATE SERPL-SCNC: 1.1 MMOL/L (ref 0.5–2)
DEPRECATED RDW RBC AUTO: 44.1 FL (ref 37–54)
EGFRCR SERPLBLD CKD-EPI 2021: 65.2 ML/MIN/1.73
EOSINOPHIL # BLD AUTO: 0.01 10*3/MM3 (ref 0–0.4)
EOSINOPHIL NFR BLD AUTO: 0.1 % (ref 0.3–6.2)
ERYTHROCYTE [DISTWIDTH] IN BLOOD BY AUTOMATED COUNT: 13.7 % (ref 12.3–15.4)
FENTANYL UR-MCNC: NEGATIVE NG/ML
FLUAV SUBTYP SPEC NAA+PROBE: NOT DETECTED
FLUBV RNA ISLT QL NAA+PROBE: NOT DETECTED
GLOBULIN UR ELPH-MCNC: 3.8 GM/DL
GLUCOSE BLDC GLUCOMTR-MCNC: 191 MG/DL (ref 70–130)
GLUCOSE SERPL-MCNC: 159 MG/DL (ref 65–99)
GLUCOSE UR STRIP-MCNC: NEGATIVE MG/DL
HADV DNA SPEC NAA+PROBE: NOT DETECTED
HCOV 229E RNA SPEC QL NAA+PROBE: NOT DETECTED
HCOV HKU1 RNA SPEC QL NAA+PROBE: NOT DETECTED
HCOV NL63 RNA SPEC QL NAA+PROBE: NOT DETECTED
HCOV OC43 RNA SPEC QL NAA+PROBE: NOT DETECTED
HCT VFR BLD AUTO: 29.6 % (ref 37.5–51)
HGB BLD-MCNC: 9.3 G/DL (ref 13–17.7)
HGB UR QL STRIP.AUTO: ABNORMAL
HMPV RNA NPH QL NAA+NON-PROBE: NOT DETECTED
HPIV1 RNA ISLT QL NAA+PROBE: NOT DETECTED
HPIV2 RNA SPEC QL NAA+PROBE: NOT DETECTED
HPIV3 RNA NPH QL NAA+PROBE: NOT DETECTED
HPIV4 P GENE NPH QL NAA+PROBE: NOT DETECTED
HYALINE CASTS UR QL AUTO: ABNORMAL /LPF
IMM GRANULOCYTES # BLD AUTO: 0.06 10*3/MM3 (ref 0–0.05)
IMM GRANULOCYTES NFR BLD AUTO: 0.5 % (ref 0–0.5)
KETONES UR QL STRIP: NEGATIVE
LEUKOCYTE ESTERASE UR QL STRIP.AUTO: NEGATIVE
LIPASE SERPL-CCNC: 19 U/L (ref 13–60)
LYMPHOCYTES # BLD AUTO: 1.73 10*3/MM3 (ref 0.7–3.1)
LYMPHOCYTES NFR BLD AUTO: 15 % (ref 19.6–45.3)
M PNEUMO IGG SER IA-ACNC: NOT DETECTED
MAGNESIUM SERPL-MCNC: 1.8 MG/DL (ref 1.6–2.4)
MCH RBC QN AUTO: 27.9 PG (ref 26.6–33)
MCHC RBC AUTO-ENTMCNC: 31.4 G/DL (ref 31.5–35.7)
MCV RBC AUTO: 88.9 FL (ref 79–97)
METHADONE UR QL SCN: NEGATIVE
MONOCYTES # BLD AUTO: 0.55 10*3/MM3 (ref 0.1–0.9)
MONOCYTES NFR BLD AUTO: 4.8 % (ref 5–12)
NEUTROPHILS NFR BLD AUTO: 79.3 % (ref 42.7–76)
NEUTROPHILS NFR BLD AUTO: 9.17 10*3/MM3 (ref 1.7–7)
NITRITE UR QL STRIP: NEGATIVE
NRBC BLD AUTO-RTO: 0 /100 WBC (ref 0–0.2)
OPIATES UR QL: NEGATIVE
OXYCODONE UR QL SCN: NEGATIVE
PCP UR QL SCN: NEGATIVE
PH UR STRIP.AUTO: 7.5 [PH] (ref 5–8)
PLATELET # BLD AUTO: 256 10*3/MM3 (ref 140–450)
PMV BLD AUTO: 11.4 FL (ref 6–12)
POTASSIUM SERPL-SCNC: 5.2 MMOL/L (ref 3.5–5.2)
PROCALCITONIN SERPL-MCNC: 0.05 NG/ML (ref 0–0.25)
PROPOXYPH UR QL: NEGATIVE
PROT SERPL-MCNC: 7.6 G/DL (ref 6–8.5)
PROT UR QL STRIP: ABNORMAL
RBC # BLD AUTO: 3.33 10*6/MM3 (ref 4.14–5.8)
RBC # UR STRIP: ABNORMAL /HPF
REF LAB TEST METHOD: ABNORMAL
RHINOVIRUS RNA SPEC NAA+PROBE: NOT DETECTED
RSV RNA NPH QL NAA+NON-PROBE: NOT DETECTED
SALICYLATES SERPL-MCNC: <0.3 MG/DL
SARS-COV-2 RNA NPH QL NAA+NON-PROBE: NOT DETECTED
SODIUM SERPL-SCNC: 138 MMOL/L (ref 136–145)
SP GR UR STRIP: 1.01 (ref 1–1.03)
SQUAMOUS #/AREA URNS HPF: ABNORMAL /HPF
TRICYCLICS UR QL SCN: NEGATIVE
UROBILINOGEN UR QL STRIP: ABNORMAL
WBC # UR STRIP: ABNORMAL /HPF
WBC NRBC COR # BLD: 11.55 10*3/MM3 (ref 3.4–10.8)

## 2023-07-31 PROCEDURE — 25010000002 AMPICILLIN PER 500 MG: Performed by: INTERNAL MEDICINE

## 2023-07-31 PROCEDURE — 87040 BLOOD CULTURE FOR BACTERIA: CPT | Performed by: EMERGENCY MEDICINE

## 2023-07-31 PROCEDURE — 80143 DRUG ASSAY ACETAMINOPHEN: CPT | Performed by: EMERGENCY MEDICINE

## 2023-07-31 PROCEDURE — 74177 CT ABD & PELVIS W/CONTRAST: CPT

## 2023-07-31 PROCEDURE — 36415 COLL VENOUS BLD VENIPUNCTURE: CPT

## 2023-07-31 PROCEDURE — 83690 ASSAY OF LIPASE: CPT | Performed by: EMERGENCY MEDICINE

## 2023-07-31 PROCEDURE — 83605 ASSAY OF LACTIC ACID: CPT | Performed by: EMERGENCY MEDICINE

## 2023-07-31 PROCEDURE — 25010000002 PIPERACILLIN SOD-TAZOBACTAM PER 1 G: Performed by: EMERGENCY MEDICINE

## 2023-07-31 PROCEDURE — 25010000002 ACYCLOVIR PER 5 MG

## 2023-07-31 PROCEDURE — P9612 CATHETERIZE FOR URINE SPEC: HCPCS

## 2023-07-31 PROCEDURE — 84145 PROCALCITONIN (PCT): CPT | Performed by: EMERGENCY MEDICINE

## 2023-07-31 PROCEDURE — 82948 REAGENT STRIP/BLOOD GLUCOSE: CPT

## 2023-07-31 PROCEDURE — 85025 COMPLETE CBC W/AUTO DIFF WBC: CPT | Performed by: EMERGENCY MEDICINE

## 2023-07-31 PROCEDURE — 63710000001 INSULIN LISPRO (HUMAN) PER 5 UNITS: Performed by: INTERNAL MEDICINE

## 2023-07-31 PROCEDURE — 0202U NFCT DS 22 TRGT SARS-COV-2: CPT | Performed by: EMERGENCY MEDICINE

## 2023-07-31 PROCEDURE — 81001 URINALYSIS AUTO W/SCOPE: CPT | Performed by: EMERGENCY MEDICINE

## 2023-07-31 PROCEDURE — 99223 1ST HOSP IP/OBS HIGH 75: CPT | Performed by: INTERNAL MEDICINE

## 2023-07-31 PROCEDURE — 80179 DRUG ASSAY SALICYLATE: CPT | Performed by: EMERGENCY MEDICINE

## 2023-07-31 PROCEDURE — 83735 ASSAY OF MAGNESIUM: CPT | Performed by: EMERGENCY MEDICINE

## 2023-07-31 PROCEDURE — 25010000002 VANCOMYCIN 10 G RECONSTITUTED SOLUTION: Performed by: EMERGENCY MEDICINE

## 2023-07-31 PROCEDURE — 25010000002 HALOPERIDOL LACTATE PER 5 MG: Performed by: EMERGENCY MEDICINE

## 2023-07-31 PROCEDURE — 80053 COMPREHEN METABOLIC PANEL: CPT | Performed by: EMERGENCY MEDICINE

## 2023-07-31 PROCEDURE — 80307 DRUG TEST PRSMV CHEM ANLYZR: CPT | Performed by: INTERNAL MEDICINE

## 2023-07-31 PROCEDURE — 99285 EMERGENCY DEPT VISIT HI MDM: CPT

## 2023-07-31 PROCEDURE — 70450 CT HEAD/BRAIN W/O DYE: CPT

## 2023-07-31 PROCEDURE — 25510000001 IOPAMIDOL 61 % SOLUTION: Performed by: EMERGENCY MEDICINE

## 2023-07-31 PROCEDURE — 25010000002 CEFTRIAXONE PER 250 MG: Performed by: INTERNAL MEDICINE

## 2023-07-31 PROCEDURE — 71045 X-RAY EXAM CHEST 1 VIEW: CPT

## 2023-07-31 PROCEDURE — 00JU3ZZ INSPECTION OF SPINAL CANAL, PERCUTANEOUS APPROACH: ICD-10-PCS | Performed by: INTERNAL MEDICINE

## 2023-07-31 RX ORDER — LORAZEPAM 0.5 MG/1
0.5 TABLET ORAL NIGHTLY
Status: DISCONTINUED | OUTPATIENT
Start: 2023-07-31 | End: 2023-08-05

## 2023-07-31 RX ORDER — ONDANSETRON 2 MG/ML
4 INJECTION INTRAMUSCULAR; INTRAVENOUS EVERY 6 HOURS PRN
Status: DISCONTINUED | OUTPATIENT
Start: 2023-07-31 | End: 2023-08-09 | Stop reason: HOSPADM

## 2023-07-31 RX ORDER — HYDRALAZINE HYDROCHLORIDE 25 MG/1
25 TABLET, FILM COATED ORAL EVERY 8 HOURS SCHEDULED
Status: DISCONTINUED | OUTPATIENT
Start: 2023-07-31 | End: 2023-08-06

## 2023-07-31 RX ORDER — SODIUM CHLORIDE 0.9 % (FLUSH) 0.9 %
10 SYRINGE (ML) INJECTION AS NEEDED
Status: DISCONTINUED | OUTPATIENT
Start: 2023-07-31 | End: 2023-08-09 | Stop reason: HOSPADM

## 2023-07-31 RX ORDER — INSULIN LISPRO 100 [IU]/ML
2-7 INJECTION, SOLUTION INTRAVENOUS; SUBCUTANEOUS
Status: DISCONTINUED | OUTPATIENT
Start: 2023-07-31 | End: 2023-08-09 | Stop reason: HOSPADM

## 2023-07-31 RX ORDER — AMOXICILLIN 250 MG
2 CAPSULE ORAL 2 TIMES DAILY
Status: DISCONTINUED | OUTPATIENT
Start: 2023-07-31 | End: 2023-08-09 | Stop reason: HOSPADM

## 2023-07-31 RX ORDER — ATORVASTATIN CALCIUM 20 MG/1
20 TABLET, FILM COATED ORAL NIGHTLY
COMMUNITY
End: 2023-08-09 | Stop reason: HOSPADM

## 2023-07-31 RX ORDER — DIVALPROEX SODIUM 250 MG/1
250 TABLET, DELAYED RELEASE ORAL 2 TIMES DAILY
Status: DISCONTINUED | OUTPATIENT
Start: 2023-07-31 | End: 2023-08-09 | Stop reason: HOSPADM

## 2023-07-31 RX ORDER — BRIMONIDINE TARTRATE 2 MG/ML
1 SOLUTION/ DROPS OPHTHALMIC 3 TIMES DAILY
Status: DISCONTINUED | OUTPATIENT
Start: 2023-07-31 | End: 2023-08-09 | Stop reason: HOSPADM

## 2023-07-31 RX ORDER — POLYETHYLENE GLYCOL 3350 17 G/17G
17 POWDER, FOR SOLUTION ORAL DAILY PRN
Status: DISCONTINUED | OUTPATIENT
Start: 2023-07-31 | End: 2023-08-09 | Stop reason: HOSPADM

## 2023-07-31 RX ORDER — ACETAMINOPHEN 325 MG/1
650 TABLET ORAL EVERY 6 HOURS PRN
COMMUNITY

## 2023-07-31 RX ORDER — ALBUTEROL SULFATE 90 UG/1
2 AEROSOL, METERED RESPIRATORY (INHALATION) EVERY 4 HOURS PRN
COMMUNITY

## 2023-07-31 RX ORDER — NICOTINE POLACRILEX 4 MG
15 LOZENGE BUCCAL
Status: DISCONTINUED | OUTPATIENT
Start: 2023-07-31 | End: 2023-08-09 | Stop reason: HOSPADM

## 2023-07-31 RX ORDER — DEXTROSE MONOHYDRATE 25 G/50ML
25 INJECTION, SOLUTION INTRAVENOUS
Status: DISCONTINUED | OUTPATIENT
Start: 2023-07-31 | End: 2023-08-09 | Stop reason: HOSPADM

## 2023-07-31 RX ORDER — IBUPROFEN 600 MG/1
1 TABLET ORAL
Status: DISCONTINUED | OUTPATIENT
Start: 2023-07-31 | End: 2023-08-09 | Stop reason: HOSPADM

## 2023-07-31 RX ORDER — SODIUM CHLORIDE 0.9 % (FLUSH) 0.9 %
10 SYRINGE (ML) INJECTION EVERY 12 HOURS SCHEDULED
Status: DISCONTINUED | OUTPATIENT
Start: 2023-07-31 | End: 2023-08-09 | Stop reason: HOSPADM

## 2023-07-31 RX ORDER — ACETAMINOPHEN 500 MG
1000 TABLET ORAL ONCE
Status: DISCONTINUED | OUTPATIENT
Start: 2023-07-31 | End: 2023-07-31

## 2023-07-31 RX ORDER — ENOXAPARIN SODIUM 100 MG/ML
40 INJECTION SUBCUTANEOUS EVERY 24 HOURS
Status: DISCONTINUED | OUTPATIENT
Start: 2023-08-01 | End: 2023-08-01

## 2023-07-31 RX ORDER — LIDOCAINE HYDROCHLORIDE AND EPINEPHRINE 10; 10 MG/ML; UG/ML
10 INJECTION, SOLUTION INFILTRATION; PERINEURAL ONCE
Status: COMPLETED | OUTPATIENT
Start: 2023-07-31 | End: 2023-07-31

## 2023-07-31 RX ORDER — CLONIDINE HYDROCHLORIDE 0.1 MG/1
0.1 TABLET ORAL EVERY 6 HOURS PRN
COMMUNITY
End: 2023-08-09 | Stop reason: HOSPADM

## 2023-07-31 RX ORDER — FAMOTIDINE 20 MG/1
20 TABLET, FILM COATED ORAL 2 TIMES DAILY
Status: DISCONTINUED | OUTPATIENT
Start: 2023-07-31 | End: 2023-08-09 | Stop reason: HOSPADM

## 2023-07-31 RX ORDER — LISINOPRIL 40 MG/1
40 TABLET ORAL DAILY
Status: DISCONTINUED | OUTPATIENT
Start: 2023-08-01 | End: 2023-08-09 | Stop reason: HOSPADM

## 2023-07-31 RX ORDER — SODIUM CHLORIDE 9 MG/ML
40 INJECTION, SOLUTION INTRAVENOUS AS NEEDED
Status: DISCONTINUED | OUTPATIENT
Start: 2023-07-31 | End: 2023-08-09 | Stop reason: HOSPADM

## 2023-07-31 RX ORDER — HYDROXYZINE HYDROCHLORIDE 25 MG/1
50 TABLET, FILM COATED ORAL EVERY 6 HOURS PRN
Status: DISCONTINUED | OUTPATIENT
Start: 2023-07-31 | End: 2023-08-09 | Stop reason: HOSPADM

## 2023-07-31 RX ORDER — FAMOTIDINE 20 MG/1
20 TABLET, FILM COATED ORAL 2 TIMES DAILY
COMMUNITY

## 2023-07-31 RX ORDER — DIFLUPREDNATE OPHTHALMIC 0.5 MG/ML
1 EMULSION OPHTHALMIC 4 TIMES DAILY
COMMUNITY

## 2023-07-31 RX ORDER — BISACODYL 10 MG
10 SUPPOSITORY, RECTAL RECTAL DAILY PRN
Status: DISCONTINUED | OUTPATIENT
Start: 2023-07-31 | End: 2023-08-09 | Stop reason: HOSPADM

## 2023-07-31 RX ORDER — HALOPERIDOL 5 MG/ML
5 INJECTION INTRAMUSCULAR ONCE
Status: COMPLETED | OUTPATIENT
Start: 2023-07-31 | End: 2023-07-31

## 2023-07-31 RX ORDER — HYDROXYZINE 50 MG/1
50 TABLET, FILM COATED ORAL EVERY 6 HOURS PRN
COMMUNITY

## 2023-07-31 RX ORDER — BRIMONIDINE TARTRATE AND TIMOLOL MALEATE 2; 5 MG/ML; MG/ML
1 SOLUTION OPHTHALMIC 2 TIMES DAILY
COMMUNITY

## 2023-07-31 RX ORDER — EMOLLIENT COMBINATION NO.97
1 OINTMENT (GRAM) TOPICAL 2 TIMES DAILY
COMMUNITY
End: 2023-08-09 | Stop reason: HOSPADM

## 2023-07-31 RX ORDER — ZIPRASIDONE MESYLATE 20 MG/ML
10 INJECTION, POWDER, LYOPHILIZED, FOR SOLUTION INTRAMUSCULAR EVERY 4 HOURS PRN
Status: DISCONTINUED | OUTPATIENT
Start: 2023-07-31 | End: 2023-08-08

## 2023-07-31 RX ORDER — ACETAMINOPHEN 650 MG/1
650 SUPPOSITORY RECTAL ONCE
Status: COMPLETED | OUTPATIENT
Start: 2023-07-31 | End: 2023-07-31

## 2023-07-31 RX ORDER — ACETAMINOPHEN 325 MG/1
650 TABLET ORAL EVERY 4 HOURS PRN
Status: DISCONTINUED | OUTPATIENT
Start: 2023-07-31 | End: 2023-08-09 | Stop reason: HOSPADM

## 2023-07-31 RX ORDER — BISACODYL 5 MG/1
5 TABLET, DELAYED RELEASE ORAL DAILY PRN
Status: DISCONTINUED | OUTPATIENT
Start: 2023-07-31 | End: 2023-08-09 | Stop reason: HOSPADM

## 2023-07-31 RX ORDER — TIMOLOL MALEATE 5 MG/ML
1 SOLUTION/ DROPS OPHTHALMIC 2 TIMES DAILY
Status: DISCONTINUED | OUTPATIENT
Start: 2023-07-31 | End: 2023-08-09 | Stop reason: HOSPADM

## 2023-07-31 RX ORDER — LORAZEPAM 2 MG/ML
0.5 INJECTION INTRAMUSCULAR EVERY 4 HOURS PRN
Status: DISCONTINUED | OUTPATIENT
Start: 2023-07-31 | End: 2023-08-05

## 2023-07-31 RX ORDER — CHOLECALCIFEROL (VITAMIN D3) 125 MCG
5 CAPSULE ORAL NIGHTLY
Status: DISCONTINUED | OUTPATIENT
Start: 2023-07-31 | End: 2023-08-09 | Stop reason: HOSPADM

## 2023-07-31 RX ORDER — DIVALPROEX SODIUM 125 MG/1
250 TABLET, DELAYED RELEASE ORAL 2 TIMES DAILY
COMMUNITY

## 2023-07-31 RX ORDER — FERROUS SULFATE 325(65) MG
325 TABLET ORAL
Status: DISCONTINUED | OUTPATIENT
Start: 2023-08-01 | End: 2023-08-09 | Stop reason: HOSPADM

## 2023-07-31 RX ORDER — HYDRALAZINE HYDROCHLORIDE 20 MG/ML
10 INJECTION INTRAMUSCULAR; INTRAVENOUS EVERY 6 HOURS PRN
Status: DISCONTINUED | OUTPATIENT
Start: 2023-07-31 | End: 2023-08-09 | Stop reason: HOSPADM

## 2023-07-31 RX ORDER — FUROSEMIDE 20 MG/1
40 TABLET ORAL DAILY
COMMUNITY
End: 2023-08-09 | Stop reason: HOSPADM

## 2023-07-31 RX ADMIN — LORAZEPAM 0.5 MG: 0.5 TABLET ORAL at 20:26

## 2023-07-31 RX ADMIN — SENNOSIDES AND DOCUSATE SODIUM 2 TABLET: 50; 8.6 TABLET ORAL at 20:26

## 2023-07-31 RX ADMIN — LIDOCAINE HYDROCHLORIDE,EPINEPHRINE BITARTRATE 10 ML: 10; .01 INJECTION, SOLUTION INFILTRATION; PERINEURAL at 15:40

## 2023-07-31 RX ADMIN — PIPERACILLIN SODIUM AND TAZOBACTAM SODIUM 3.38 G: 3; .375 INJECTION, SOLUTION INTRAVENOUS at 12:51

## 2023-07-31 RX ADMIN — TIMOLOL MALEATE 1 DROP: 5 SOLUTION/ DROPS OPHTHALMIC at 20:26

## 2023-07-31 RX ADMIN — ACETAMINOPHEN 650 MG: 325 TABLET ORAL at 20:36

## 2023-07-31 RX ADMIN — IOPAMIDOL 85 ML: 612 INJECTION, SOLUTION INTRAVENOUS at 14:50

## 2023-07-31 RX ADMIN — VANCOMYCIN HYDROCHLORIDE 1750 MG: 10 INJECTION, POWDER, LYOPHILIZED, FOR SOLUTION INTRAVENOUS at 15:11

## 2023-07-31 RX ADMIN — FAMOTIDINE 20 MG: 20 TABLET, FILM COATED ORAL at 20:26

## 2023-07-31 RX ADMIN — INSULIN LISPRO 2 UNITS: 100 INJECTION, SOLUTION INTRAVENOUS; SUBCUTANEOUS at 20:36

## 2023-07-31 RX ADMIN — Medication 5 MG: at 20:26

## 2023-07-31 RX ADMIN — SODIUM CHLORIDE 2000 MG: 900 INJECTION INTRAVENOUS at 23:37

## 2023-07-31 RX ADMIN — BRIMONIDINE TARTRATE 1 DROP: 2 SOLUTION OPHTHALMIC at 20:26

## 2023-07-31 RX ADMIN — DIVALPROEX SODIUM 250 MG: 250 TABLET, DELAYED RELEASE ORAL at 20:26

## 2023-07-31 RX ADMIN — Medication 10 ML: at 20:26

## 2023-07-31 RX ADMIN — THIAMINE HCL TAB 100 MG 100 MG: 100 TAB at 20:26

## 2023-07-31 RX ADMIN — HALOPERIDOL LACTATE 5 MG: 5 INJECTION, SOLUTION INTRAMUSCULAR at 15:37

## 2023-07-31 RX ADMIN — SODIUM CHLORIDE 1000 ML: 9 INJECTION, SOLUTION INTRAVENOUS at 12:51

## 2023-07-31 RX ADMIN — ACETAMINOPHEN 650 MG: 650 SUPPOSITORY RECTAL at 12:54

## 2023-07-31 RX ADMIN — ACYCLOVIR SODIUM 860 MG: 500 INJECTION, SOLUTION INTRAVENOUS at 20:24

## 2023-07-31 RX ADMIN — AMPICILLIN SODIUM 2 G: 2 INJECTION, POWDER, FOR SOLUTION INTRAVENOUS at 20:24

## 2023-07-31 RX ADMIN — HYDRALAZINE HYDROCHLORIDE 25 MG: 25 TABLET, FILM COATED ORAL at 20:29

## 2023-07-31 NOTE — H&P
[facility] Medicine Services  HISTORY AND PHYSICAL    Patient Name: Omkar Nava  : 1955  MRN: [id number]  Primary Care Physician: Deann Cao MD  Date of admission: 2023      Subjective   Subjective     Chief Complaint:  AMS    HPI:  Omkar Nava is a 67 y.o. male with PMHx significant for dementia w/psychosis, DMII, HTN, polysubstance abuse, osteomyelitis L foot s/p left transmetatarsal amputation 2022 who currently resides in nursing home. Daughter brought patient into the ED due to change in mental status. She reports patient has been doing really well lately, has been pleasant, conversant. At baseline he is able to recognize her, he knows the day/month, place ect. This week he developed UTI type symptoms and was put on a short course of ciprofloxacin about 3 days ago, however daughter states she was concerned as his mental status and fever continued to worsen. She states he complained of his head hurting, denied neck pain. On arrival to the ED he was febrile to 103. His work-up including CT scan were unremarkable with no clear source. LP was attempted in both the ED and IR, however was unsuccessful due to agitation, despite sedating medications.     Review of Systems   UTO, secondary to mental status    Personal History     Past Medical History:   Diagnosis Date    Anemia     Dementia     Diabetes mellitus     Dysphagia     GERD (gastroesophageal reflux disease)     History of alcohol abuse     History of cocaine use     History of marijuana use     Hypertension     Osteomyelitis     Poor historian     records obtained from nursing home records & his family    Visual impairment              Past Surgical History:   Procedure Laterality Date    AMPUTATION FOOT Left 10/18/2022    Procedure: PARTIAL FIRST RAY AMPUTATION LEFT;  Surgeon: Yeison Petty MD;  Location: [facility];  Service: Orthopedics;  Laterality: Left;    AMPUTATION FOOT Left 2022     "Procedure: Transmetatarsal of Left Foot;  Surgeon: Yeison Petty MD;  Location: [facility];  Service: Orthopedics;  Laterality: Left;    EYE SURGERY         Family History: family history includes Diabetes in his brother, brother, father, maternal grandfather, maternal grandmother, mother, and sister; Hypertension in his brother, brother, father, and mother.     Social History:  reports that he quit smoking about 6 years ago. His smoking use included cigarettes. He has a 40.00 pack-year smoking history. He has never used smokeless tobacco. He reports that he does not currently use alcohol. He reports current drug use. Drugs: Marijuana and \"Crack\" cocaine.  Social History     Social History Narrative    Caffeine 0-1 servings per day    Patient lives at home .       Medications:  Available home medication information reviewed.  Medications Prior to Admission   Medication Sig Dispense Refill Last Dose    acetaminophen (TYLENOL) 325 MG tablet Take 2 tablets by mouth Every 6 (Six) Hours As Needed for Mild Pain, Headache or Fever.   7/30/2023    albuterol sulfate  (90 Base) MCG/ACT inhaler Inhale 2 puffs Every 4 (Four) Hours As Needed for Wheezing or Shortness of Air.   Past Month    apixaban (ELIQUIS) 2.5 MG tablet tablet Take 1 tablet by mouth 2 (Two) Times a Day. VTE ppx   7/31/2023    atorvastatin (LIPITOR) 20 MG tablet Take 1 tablet by mouth Every Night.   7/30/2023    brimonidine-timolol (COMBIGAN) 0.2-0.5 % ophthalmic solution Administer 1 drop to both eyes 2 (Two) Times a Day.   7/30/2023    Cholecalciferol 50 MCG (2000 UT) capsule Take 3 capsules by mouth Every Morning.   7/30/2023    difluprednate (DUREZOL) 0.05 % ophthalmic emulsion Administer 1 drop into the left eye 4 (Four) Times a Day.   7/30/2023    divalproex (DEPAKOTE) 125 MG DR tablet Take 2 tablets by mouth 2 (Two) Times a Day.   7/30/2023    Emollient (Aquaphilic) ointment Apply 1 application  topically to the appropriate area as directed 2 " (Two) Times a Day. Apply to scalp, lower back, arms, legs, for dry skin   7/30/2023    famotidine (PEPCID) 20 MG tablet Take 1 tablet by mouth 2 (Two) Times a Day.   7/30/2023    ferrous sulfate 325 (65 FE) MG tablet Take 1 tablet by mouth Daily With Breakfast.   7/30/2023    furosemide (LASIX) 20 MG tablet Take 2 tablets by mouth Daily.   7/31/2023    hydrALAZINE (APRESOLINE) 25 MG tablet Take 1 tablet by mouth Every 8 (Eight) Hours. 90 tablet 0 7/30/2023    hydrOXYzine (ATARAX) 50 MG tablet Take 1 tablet by mouth Every 6 (Six) Hours As Needed for Anxiety.   Past Week    insulin glargine (LANTUS, SEMGLEE) 100 UNIT/ML injection Inject 6 Units under the skin into the appropriate area as directed Every Night.   7/30/2023    lisinopril (PRINIVIL,ZESTRIL) 40 MG tablet Take 1 tablet by mouth Daily.   7/31/2023    LORazepam (ATIVAN) 0.5 MG tablet Take 1 tablet by mouth every night at bedtime.   7/30/2023    melatonin 5 MG tablet tablet Take 1 tablet by mouth Every Night.   7/30/2023    metFORMIN (GLUCOPHAGE) 1000 MG tablet Take 1 tablet by mouth 2 (Two) Times a Day With Meals. (Patient taking differently: Take 0.5 tablets by mouth 3 (Three) Times a Day With Meals.)   7/30/2023    thiamine (VITAMIN B-1) 100 MG tablet  tablet Take 1 tablet by mouth 3 (Three) Times a Day.   7/30/2023    cloNIDine (CATAPRES) 0.1 MG tablet Take 1 tablet by mouth Every 6 (Six) Hours As Needed for High Blood Pressure. SBP >160   Unknown at [facility]    Glucagon HCl 1 MG reconstituted solution Inject 1 mg into the appropriate muscle as directed by prescriber As Needed.   Unknown    Nepafenac 0.3 % suspension Administer 1 drop into the left eye every night at bedtime. Start on 8-9-23 for 2 days   Unknown       No Known Allergies    Objective   Objective     Vital Signs:   Temp:  [98.4 øF (36.9 øC)-103.6 øF (39.8 øC)] 99 øF (37.2 øC)  Heart Rate:  [] 89  Resp:  [18-20] 20  BP: (141-176)/() 141/72  Flow (L/min):  [2] 2       Physical Exam    Constitutional: Awake, alert  Eyes: PERRLA, sclerae anicteric  HENT: NCAT  Neck: Supple, no thyromegaly, no lymphadenopathy, trachea midline  Respiratory: Clear to auscultation bilaterally, nonlabored respirations   Cardiovascular: RRR, no murmurs, rubs, or gallops, palpable pedal pulses bilaterally  Gastrointestinal: soft, nontender, nondistended  Musculoskeletal: No bilateral ankle edema  Psychiatric: Appropriate affect, cooperative  Neurologic: confused, UTO strength assessment  Skin: No rashes      Result Review:  I have personally reviewed the results from the time of this admission to 7/31/2023 18:44 EDT and agree with these findings:  [x]  Laboratory list / accordion  [x]  Microbiology  [x]  Radiology  [x]  EKG/Telemetry   [x]  Cardiology/Vascular   [x]  Pathology  []  Old records  []  Other:  Most notable findings include:         LAB RESULTS:      Lab 07/31/23  1231   WBC 11.55*   HEMOGLOBIN 9.3*   HEMATOCRIT 29.6*   PLATELETS 256   NEUTROS ABS 9.17*   IMMATURE GRANS (ABS) 0.06*   LYMPHS ABS 1.73   MONOS ABS 0.55   EOS ABS 0.01   MCV 88.9   PROCALCITONIN 0.05   LACTATE 1.1         Lab 07/31/23  1231   SODIUM 138   POTASSIUM 5.2   CHLORIDE 103   CO2 22.0   ANION GAP 13.0   BUN 22   CREATININE 1.23   EGFR 65.2   GLUCOSE 159*   CALCIUM 9.3   MAGNESIUM 1.8         Lab 07/31/23  1231   TOTAL PROTEIN 7.6   ALBUMIN 3.8   GLOBULIN 3.8   ALT (SGPT) 25   AST (SGOT) 25   BILIRUBIN <0.2   ALK PHOS 75   LIPASE 19                     UA          7/31/2023    12:30   Urinalysis   Squamous Epithelial Cells, UA 0-2    Specific [place], UA 1.015    Ketones, UA Negative    Blood, UA Small (1+)    Leukocytes, UA Negative    Nitrite, UA Negative    RBC, UA Too Numerous to Count    WBC, UA 0-2    Bacteria, UA None Seen        Microbiology Results (last 10 days)       Procedure Component Value - Date/Time    Respiratory Panel PCR w/COVID-19(SARS-CoV-2) GIANNI/SIENNA/ANNA/PAD/[facility]/MAD/[facility]IA In-House, NP Swab in UTM/VTM, 3-4 HR TAT - Swab,  Nasopharynx [[id number]]  (Normal) Collected: 07/31/23 1328    Lab Status: Final result Specimen: Swab from Nasopharynx Updated: 07/31/23 1439     ADENOVIRUS, PCR Not Detected     Coronavirus 229E Not Detected     Coronavirus HKU1 Not Detected     Coronavirus NL63 Not Detected     Coronavirus OC43 Not Detected     COVID19 Not Detected     Human Metapneumovirus Not Detected     Human Rhinovirus/Enterovirus Not Detected     Influenza A PCR Not Detected     Influenza B PCR Not Detected     Parainfluenza Virus 1 Not Detected     Parainfluenza Virus 2 Not Detected     Parainfluenza Virus 3 Not Detected     Parainfluenza Virus 4 Not Detected     RSV, PCR Not Detected     Bordetella pertussis pcr Not Detected     Bordetella parapertussis PCR Not Detected     Chlamydophila pneumoniae PCR Not Detected     Mycoplasma pneumo by PCR Not Detected    Narrative:      In the setting of a positive respiratory panel with a viral infection PLUS a negative procalcitonin without other underlying concern for bacterial infection, consider observing off antibiotics or discontinuation of antibiotics and continue supportive care. If the respiratory panel is positive for atypical bacterial infection (Bordetella pertussis, Chlamydophila pneumoniae, or Mycoplasma pneumoniae), consider antibiotic de-escalation to target atypical bacterial infection.            CT Head Without Contrast    Result Date: 7/31/2023  CT HEAD WO CONTRAST Date of Exam: 7/31/2023 2:33 PM EDT Indication: AMS. Comparison: 4/29/2022 MR brain Technique: Axial CT images were obtained of the head without contrast administration.  Automated exposure control and iterative construction methods were used. Findings: Parenchyma:No acute intraparenchymal hemorrhage. No loss of gray-white differentiation to suggest large territory infarct. Mild parenchymal volume loss. Scattered periventricular and subcortical white matter hypodensities, nonspecific, but most often consistent with  small vessel ischemic changes. No midline shift or herniation. Ventricles and extra axial spaces:Prominent ventricles and sulci secondary to volume loss. No extra axial fluid collection seen. Other: Right lens replacement. Paranasal sinuses are clear. Mastoid air cells are clear. Calvarium is intact. Intracranial atherosclerotic calcification is present.     Impression: Impression: No evidence of acute hemorrhage or large territory infarct Chronic changes as above. Electronically Signed: Anil Krishnamurthy  7/31/2023 2:54 PM EDT  Workstation ID: [id number]    CT Abdomen Pelvis With Contrast    Result Date: 7/31/2023  CT ABDOMEN PELVIS W CONTRAST Date of Exam: 7/31/2023 2:33 PM EDT Indication: ams. Comparison: CT of the abdomen and pelvis dated 5/6/2017 Technique: Axial CT images were obtained of the abdomen and pelvis following the uneventful intravenous administration of 85 mL Isovue-300. Reconstructed coronal and sagittal images were also obtained. Automated exposure control and iterative construction methods were used. Findings: Examination is limited due to motion artifact. Liver: The liver is unremarkable in morphology. No focal liver lesion is seen. No biliary dilation is seen. Gallbladder: Gallbladder is not well-visualized due to motion artifact. Pancreas: Unremarkable. Spleen: Unremarkable. Adrenal glands: Unremarkable. Genitourinary tract: Kidneys and ureters appear unremarkable. Bladder wall thickening may be related to underdistention or cystitis. Prostate gland is unremarkable. Gastrointestinal tract: Moderate colonic stool is present. Scattered colonic diverticula are seen. No findings to suggest bowel obstruction. Appendix: No findings to suggest acute appendicitis. Other findings: No free air or free fluid is identified. No pathologically enlarged lymph nodes are seen. Vascular calcifications are seen. IVC is unremarkable. Bones and soft tissues: No acute osseous lesion is identified. There are mild  degenerative changes of the spine. There is 10 mm anterolisthesis at L4-L5. Superficial soft tissues demonstrate no acute abnormality. Lung bases: Scattered bibasilar atelectasis.     Impression: Impression: 1.Examination is limited due to motion artifact. 2.Given this limitation, no acute abnormality identified within the abdomen or pelvis. 3.Moderate colonic stool. 4.Bladder wall thickening may be related to underdistention or cystitis. Please correlate with urinalysis. 5.Additional findings as detailed above. Electronically Signed: Ryan Perales  7/31/2023 2:59 PM EDT  Workstation ID: [id number]    XR Chest 1 View    Result Date: 7/31/2023  XR CHEST 1 VW Date of Exam: 7/31/2023 1:02 PM EDT Indication: AMS Comparison: 12/10/2022 Findings: Heart shadow is mildly enlarged. Vasculature is cephalized. Minimal if any perihilar edema present; overall pattern is similar to the prior study. There may be a small area of focal atelectasis in the medial left lung base. No other focal lung disease, effusion or pneumothorax is seen.     Impression: Impression: Moderate pulmonary venous hypertension. Small focus of left lower lobe atelectasis. Electronically Signed: Wisam Oquendo  7/31/2023 1:35 PM EDT  Workstation ID: [id number]         Assessment & Plan   Assessment & Plan     Active Hospital Problems    Diagnosis  POA    **AMS (altered mental status) [R41.82]  Yes    S/P transmetatarsal amputation of foot, left [Z89.432]  Not Applicable    Neurocognitive disorder [R41.9]  Yes    Type 2 diabetes mellitus [E11.9]  Yes    Essential hypertension [I10]  Yes    Psychotic disorder [F29]  Yes     Omkar Nava is a 67 y.o. male with PMHx significant for dementia w/psychosis, DMII, HTN, polysubstance abuse, osteomyelitis L foot s/p left transmetatarsal amputation 12/2022 who currently resides in nursing home who presented with AMS and fever.    AMS   Fever/Sepsis - concern for CNS infection  - empirically cover for meningitis for now given  AMS/headache and fevers, will use ampicillin, CTX, Vancomycin, acyclovir  - LP attempted in the ER as well as IR without success -mostly due to agitation  - infectious work-up including UA, resp PCR, CT abd/pelvis, CT head, CXR unremarkable for clear source of fever  - could consider partially treated cystitis as etiology, however UA is bland  - de-escalate abx as able, re-attempt LP in the AM    Dementia w/Psychosis:  - PRN geodon and ativan for agtitation  - continue home oral regimen when okay to take PO - appears he is on depakote, PRN atarax and nightly scheduled ativan    DMII  - SSI coverage for now    HTN:  - continue home regimen, PRN IV medications     DVT prophylaxis:  Hold Eliquis, Lovenox for now    CODE STATUS:  Daughter states patient is to remain a full code  Code Status and Medical Interventions:   Ordered at: 07/31/23 1801     Level Of Support Discussed With:    Health Care Surrogate     Code Status (Patient has no pulse and is not breathing):    CPR (Attempt to Resuscitate)     Medical Interventions (Patient has pulse or is breathing):    Full Support     Release to patient:    Routine Release     Total time spent: 78 minutes  Time spent includes time reviewing chart, face-to-face time, counseling patient/family/caregiver, ordering medications/tests/procedures, communicating with other health care professionals, documenting clinical information in the electronic health record, and coordination of care.       Expected Discharge TBD  Expected discharge date/ time has not been documented.     Sarah Gann, DO  07/31/23

## 2023-07-31 NOTE — CONSULTS
Pharmacy Consult-Vancomycin Dosing  Omkar Nava is a  67 y.o. male receiving vancomycin therapy.     Indication: CNS infection  Consulting Provider: Dr. Gann   ID Consult: Y    Goal AUC: 400 - 600 mg/L*hr    Current Antimicrobial Therapy  Anti-Infectives (From admission, onward)      Ordered     Dose/Rate Route Frequency Start Stop    07/31/23 1834  vancomycin 1250 mg/250 mL 0.9% NS IVPB ([facility])        Ordering Provider: Blaise Miranda IV, PharmD    15 mg/kg x 85.7 kg  over 75 Minutes Intravenous Every 18 Hours 08/01/23 0600 08/07/23 0559    07/31/23 1807  cefTRIAXone (ROCEPHIN) 2000 mg/100 mL 0.9% NS IVPB ([facility])        Ordering Provider: Sarah Gann DO    2,000 mg  over 30 Minutes Intravenous Every 12 Hours 08/01/23 0000 08/07/23 2359    07/31/23 1807  ampicillin 2000 mg/100 mL 0.9% NS ([facility])        Ordering Provider: Sarah Gann DO    2 g  over 30 Minutes Intravenous Every 4 Hours 07/31/23 2000 08/05/23 1959 07/31/23 1809  acyclovir (ZOVIRAX) 860 mg in sodium chloride 0.9 % 250 mL IVPB        Ordering Provider: Blaise Miranda IV, PharmD    10 mg/kg x 85.7 kg  over 60 Minutes Intravenous Every 8 Hours 07/31/23 2000 08/14/23 1959 07/31/23 1807  Pharmacy to Dose acyclovir (ZOVIRAX)        Ordering Provider: Sarah Gann DO     Does not apply Continuous PRN 07/31/23 1805 08/14/23 1804    07/31/23 1807  Pharmacy to dose vancomycin        Ordering Provider: Sarah Gann DO     Does not apply Continuous PRN 07/31/23 1803 08/06/23 1802    07/31/23 1257  vancomycin 1750 mg/500 mL 0.9% NS IVPB ([facility])        Ordering Provider: Omkar Seth MD    20 mg/kg x 85.7 kg  over 105 Minutes Intravenous Once 07/31/23 1330 07/31/23 1656    07/31/23 1120  piperacillin-tazobactam (ZOSYN) 3.375 g in iso-osmotic dextrose 50 ml (premix)        Ordering Provider: Omkar Seth MD    3.375 g Intravenous Once 07/31/23 1136 07/31/23 1325            Allergies  Allergies as of 07/31/2023     "(No Known Allergies)       Labs    Results from last 7 days   Lab Units 07/31/23  1231   BUN mg/dL 22   CREATININE mg/dL 1.23       Results from last 7 days   Lab Units 07/31/23  1231   WBC 10*3/mm3 11.55*       Evaluation of Dosing     Last Dose Received in the ED/Outside Facility: 1750 mg @ 1511  Is Patient on Dialysis or Renal Replacement: N    Ht - 182.9 cm (72\")  Wt - 85.7 kg (189 lb)    Estimated Creatinine Clearance: 72.6 mL/min (by C-G formula based on SCr of 1.23 mg/dL).    Intake & Output (last 3 days)         07/28 0701 07/29 0700 07/29 0701 07/30 0700 07/30 0701 07/31 0700 07/31 0701 08/01 0700    IV Piggyback    50    Total Intake(mL/kg)    50 (0.6)    Net    +50                    Microbiology and Radiology  Microbiology Results (last 10 days)       Procedure Component Value - Date/Time    Respiratory Panel PCR w/COVID-19(SARS-CoV-2) G[facility]/SIENNA/ANNA/[facility] In-House, NP Swab in UTM/VTM, 3-4 HR TAT - Swab, Nasopharynx [[id number]]  (Normal) Collected: 07/31/23 1328    Lab Status: Final result Specimen: Swab from Nasopharynx Updated: 07/31/23 1439     ADENOVIRUS, PCR Not Detected     Coronavirus 229E Not Detected     Coronavirus HKU1 Not Detected     Coronavirus NL63 Not Detected     Coronavirus OC43 Not Detected     COVID19 Not Detected     Human Metapneumovirus Not Detected     Human Rhinovirus/Enterovirus Not Detected     Influenza A PCR Not Detected     Influenza B PCR Not Detected     Parainfluenza Virus 1 Not Detected     Parainfluenza Virus 2 Not Detected     Parainfluenza Virus 3 Not Detected     Parainfluenza Virus 4 Not Detected     RSV, PCR Not Detected     Bordetella pertussis pcr Not Detected     Bordetella parapertussis PCR Not Detected     Chlamydophila pneumoniae PCR Not Detected     Mycoplasma pneumo by PCR Not Detected    Narrative:      In the setting of a positive respiratory panel with a viral infection PLUS a negative procalcitonin without other underlying concern for " bacterial infection, consider observing off antibiotics or discontinuation of antibiotics and continue supportive care. If the respiratory panel is positive for atypical bacterial infection (Bordetella pertussis, Chlamydophila pneumoniae, or Mycoplasma pneumoniae), consider antibiotic de-escalation to target atypical bacterial infection.            Reported Vancomycin Levels                         [employer] AUC Calculation:    Current AUC:    mg/L*hr    Predicted Steady State AUC on Current Dose:  mg/L*hr  _________________________________    Predicted Steady State AUC on New Dose:   538 mg/L*hr    Assessment/Plan:    Advanced age male with recent history of UTI s/p 4 days of ciprofloxacin. Presents with fever, worsening AMS.   Status post 1750 mg IV vancomycin this afternoon.   Procalcitonin negative, left shift noted, renal markers WNL although UO unavailable yet. LP pending.     Vancomycin 1250 mg IV q18h starting tomorrow morning.  Random vancomycin level Wednesday with AM labs to assess kinetics.  Acyclovir 10 mg/kg IV q8h per renal function.  Ampicillin 2g IV q4h per renal function.   Ceftriaxone 2 gm IV q12h    Thank you for this consult.  Blaise Miranda IV, PharmD, BCPS  7/31/2023  18:42 EDT

## 2023-07-31 NOTE — LETTER
EMS Transport Request  For use at [facility], [place], [place], and [place] only   Patient Name: Omkar Nava : 1955   Weight:59.4 kg (131 lb) Pick-up Location: [facility] BLS/ALS: BLS/ALS: BLS   Insurance: MEDICARE Auth End Date:    Pre-Cert #: D/C Summary complete:    Destination: Other [facility]   Contact Precautions: None   Equipment (O2, Fluids, etc.): None   Arrive By Date/Time: , afternoon Stretcher/WC: Stretcher   CM Requesting: Edie Ochoa RN Ext: [phone]   Notes/Medical Necessity:      ______________________________________________________________________    *Only 2 patient bags OR 1 carry-on size bag are permitted.  Wheelchairs and walkers CANNOT transported with the patient. Acknowledge: Yes

## 2023-07-31 NOTE — ED PROVIDER NOTES
"Subjective   History of Present Illness  67-year-old male presents for evaluation of altered mental status.  The patient is accompanied by his daughter who provides the majority of his history.  He is currently residing in a rehab facility in [place], [place].  She reports that the patient was diagnosed with a UTI late last week and has been on oral ciprofloxacin for the past 4 days.  She notes that over the past 2 days the patient has had a significant change to his mental status and has become increasingly altered and confused.  She tells me the patient is typically quite alert at baseline and she notes that he \"does not even know her name\" this morning.  The facility wanted him to go to the hospital in [place], but she felt that he would receive better care here so the patient was sent here instead for further evaluation and treatment.  The patient is currently confused and his history is obtained solely from his daughter as he is unable to provide any useful history at this time.    Review of Systems   Unable to perform ROS: Mental status change   Constitutional:  Positive for fever.     Past Medical History:   Diagnosis Date    Anemia     Dementia     Diabetes mellitus     Dysphagia     GERD (gastroesophageal reflux disease)     History of alcohol abuse     History of cocaine use     History of marijuana use     Hypertension     Osteomyelitis     Poor historian     records obtained from nursing home records & his family    Visual impairment        No Known Allergies    Past Surgical History:   Procedure Laterality Date    AMPUTATION FOOT Left 10/18/2022    Procedure: PARTIAL FIRST RAY AMPUTATION LEFT;  Surgeon: Yeison Petty MD;  Location: [facility] OR;  Service: Orthopedics;  Laterality: Left;    AMPUTATION FOOT Left 12/5/2022    Procedure: Transmetatarsal of Left Foot;  Surgeon: Yeison Petty MD;  Location: [facility] OR;  Service: Orthopedics;  Laterality: Left;    EYE SURGERY         Family History " "  Problem Relation Age of Onset    Diabetes Mother     Hypertension Mother     Hypertension Father     Diabetes Father     Diabetes Sister     Hypertension Brother     Diabetes Brother     Diabetes Maternal Grandmother     Diabetes Maternal Grandfather     Hypertension Brother     Diabetes Brother        Social History     Socioeconomic History    Marital status: Single   Tobacco Use    Smoking status: Former     Packs/day: 1.00     Years: 40.00     Pack years: 40.00     Types: Cigarettes     Quit date: 2017     Years since quittin.2    Smokeless tobacco: Never   Substance and Sexual Activity    Alcohol use: Not Currently     Comment: histgry of alcohol abuse    Drug use: Yes     Types: Marijuana, \"Crack\" cocaine     Comment: PATIENT STATES \"IT'S BEEN A FEW DAYS\"    Sexual activity: Defer           Objective   Physical Exam  Vitals and nursing note reviewed.   Constitutional:       General: He is not in acute distress.     Appearance: He is well-developed. He is not diaphoretic.      Comments: Ill-appearing male, confused   HENT:      Head: Normocephalic and atraumatic.      Comments: No mucous membrane lesions  Neck:      Vascular: No JVD.      Comments: No meningeal signs or nuchal rigidity  Cardiovascular:      Rate and Rhythm: Normal rate and regular rhythm.      Heart sounds: Normal heart sounds. No murmur heard.    No friction rub. No gallop.   Pulmonary:      Effort: Pulmonary effort is normal. No respiratory distress.      Breath sounds: Normal breath sounds. No wheezing or rales.   Abdominal:      General: Bowel sounds are normal. There is no distension.      Palpations: Abdomen is soft. There is no mass.      Tenderness: There is no abdominal tenderness. There is no guarding.   Musculoskeletal:         General: Normal range of motion.      Cervical back: Normal range of motion.   Skin:     General: Skin is warm and dry.      Coloration: Skin is not pale.      Findings: No erythema or rash. "   Neurological:      Comments: Moving all 4 extremities, appears confused, following simple commands   Psychiatric:      Comments: Unable to adequately evaluate       Lumbar Puncture    Date/Time: 7/31/2023 4:41 PM  Performed by: Omkar Seth MD  Authorized by: Omkar Seth MD     Consent:     Consent obtained:  Written and verbal    Consent given by:  Healthcare agent    Risks, benefits, and alternatives were discussed: yes      Risks discussed:  Bleeding, infection, pain, repeat procedure, nerve damage and headache  Universal protocol:     Procedure explained and questions answered to patient or proxy's satisfaction: yes      Relevant documents present and verified: yes      Test results available: yes      Imaging studies available: yes      Required blood products, implants, devices, and special equipment available: yes      Immediately prior to procedure a time out was called: yes      Site/side marked: yes      Patient identity confirmed:  Verbally with patient and arm band  Pre-procedure details:     Procedure purpose:  Diagnostic    Preparation: Patient was prepped and draped in usual sterile fashion    Anesthesia:     Anesthesia method:  Local infiltration    Local anesthetic:  Lidocaine 1% WITH epi  Procedure details:     Lumbar space:  L4-L5 interspace    Patient position:  R lateral decubitus    Needle type:  Spinal needle - Quincke tip    Ultrasound guidance: no      Number of attempts:  3    Total volume (ml):  0  Post-procedure details:     Puncture site:  Adhesive bandage applied    Procedure completion:  Procedure terminated electively by provider           ED Course  ED Course as of 07/31/23 1643   Mon Jul 31, 2023   1519 67-year-old male presents for evaluation of altered mental status.  The patient is accompanied by his daughter who provides majority of his history.  He is currently residing in a rehab facility in [place], [place].  She reports that he was diagnosed with a UTI  late last week and has been on oral ciprofloxacin for the past 4 days.  She notes that over the past 2 days the patient has had a significant change to his mental status and has been quite altered and confused when compared to baseline.  The facility wanted to send him to the hospital in [place], but she chose to bring him here instead as she felt that he would receive better care.  On arrival to the ED, the patient is febrile at 103.6 øF.  He appears generally weak and is clearly confused.  Nonsurgical abdomen.  Labs are unrevealing.  No obvious source of infection noted. [DD]   1521 I personally and independently viewed the patient's x-ray images myself, and I am in agreement with the radiologist's reading for final interpretation--particularly, there is no obvious pneumonia noted. [DD]   1521 CT head is negative.  CT of abdomen/pelvis is negative for obvious source of infection. [DD]   1521 Given the patient's overall clinical presentation with fever and confusion, there is concern for potential CNS infection.  Given the patient's [DD]   1521 Fever and altered mental status, feel that he warrants admission to the hospital at this point.  We will attempt to perform lumbar puncture prior to sending  the patient upstairs. [DD]   1522 I discussed the patient's case with our hospitalist,  And the patient will be admitted under her care for further evaluation and treatment pending sudhir Gann puncture results. [DD]   1522 Family is aware/agreeable with the plan at this time. [DD]   1642 Multiple attempts were made at lumbar puncture at bedside without success.  The patient was flailing throughout the attempted procedure and despite chemical sedation with Haldol was unable to cooperate to the point where I felt comfortable doing a lumbar puncture safely.  I reached out to Dr. Sy of interventional radiology who will attempt lumbar puncture under sedation and imaging.  Dr. Gann aware.  We will hold off on  giving the patient acyclovir pending CSF results. [DD]      ED Course User Index  [DD] Omkar Seth MD                                      Recent Results (from the past 24 hour(s))   POC Glucose Once    Collection Time: 07/31/23  8:26 PM    Specimen: Blood   Result Value Ref Range    Glucose 191 (H) 70 - 130 mg/dL   POC Glucose Once    Collection Time: 08/01/23  8:48 AM    Specimen: Blood   Result Value Ref Range    Glucose 203 (H) 70 - 130 mg/dL   POC Glucose Once    Collection Time: 08/01/23 11:51 AM    Specimen: Blood   Result Value Ref Range    Glucose 194 (H) 70 - 130 mg/dL   POC Glucose Once    Collection Time: 08/01/23  4:29 PM    Specimen: Blood   Result Value Ref Range    Glucose 164 (H) 70 - 130 mg/dL     Note: In addition to lab results from this visit, the labs listed above may include labs taken at another facility or during a different encounter within the last 24 hours. Please correlate lab times with ED admission and discharge times for further clarification of the services performed during this visit.    CT Abdomen Pelvis With Contrast   Final Result   Impression:   1.Examination is limited due to motion artifact.   2.Given this limitation, no acute abnormality identified within the abdomen or pelvis.   3.Moderate colonic stool.   4.Bladder wall thickening may be related to underdistention or cystitis. Please correlate with urinalysis.   5.Additional findings as detailed above.      Electronically Signed: Ryan Perales     7/31/2023 2:59 PM EDT     Workstation ID: [id number]      CT Head Without Contrast   Final Result   Impression:   No evidence of acute hemorrhage or large territory infarct      Chronic changes as above.            Electronically Signed: Anil Krishnamurthy     7/31/2023 2:54 PM EDT     Workstation ID: [id number]      XR Chest 1 View   Final Result   Impression:   Moderate pulmonary venous hypertension. Small focus of left lower lobe atelectasis.         Electronically Signed:  Wisam Enriquezd     7/31/2023 1:35 PM EDT     Workstation ID: [id number]      IR LUMBAR PUNCTURE DIAGNOSTIC    (Results Pending)   IR LUMBAR PUNCTURE DIAGNOSTIC    (Results Pending)     Vitals:    08/01/23 0244 08/01/23 0624 08/01/23 1151 08/01/23 1538   BP: 145/88 147/87 165/74 142/72   BP Location: Left arm Left arm Left arm Left arm   Patient Position: Lying Lying Lying Lying   Pulse: 70 64 62 61   Resp: 18 18 18 18   Temp: 96.8 øF (36 øC) 96.8 øF (36 øC) 97.3 øF (36.3 øC) 96.5 øF (35.8 øC)   TempSrc: Axillary Axillary Axillary Axillary   SpO2: 95% 99% 99%    Weight:       Height:         Medications   sodium chloride 0.9 % flush 10 mL (has no administration in time range)   ziprasidone (GEODON) injection 10 mg (has no administration in time range)   brimonidine (ALPHAGAN) 0.2 % ophthalmic solution 1 drop (1 drop Both Eyes Given 8/1/23 0850)     And   timolol (TIMOPTIC) 0.5 % ophthalmic solution 1 drop (1 drop Both Eyes Given 8/1/23 0850)   divalproex (DEPAKOTE) DR tablet 250 mg (250 mg Oral Given 8/1/23 0850)   famotidine (PEPCID) tablet 20 mg (20 mg Oral Given 8/1/23 0850)   ferrous sulfate tablet 325 mg (325 mg Oral Given 8/1/23 0849)   hydrALAZINE (APRESOLINE) tablet 25 mg (25 mg Oral Given 8/1/23 1417)   hydrOXYzine (ATARAX) tablet 50 mg (has no administration in time range)   lisinopril (PRINIVIL,ZESTRIL) tablet 40 mg (40 mg Oral Given 8/1/23 0850)   LORazepam (ATIVAN) tablet 0.5 mg (0.5 mg Oral Given 7/31/23 2026)   melatonin tablet 5 mg (5 mg Oral Given 7/31/23 2026)   thiamine (VITAMIN B-1) tablet 100 mg (100 mg Oral Given 8/1/23 0849)   sodium chloride 0.9 % flush 10 mL (10 mL Intravenous Not Given 8/1/23 1016)   sodium chloride 0.9 % flush 10 mL (has no administration in time range)   sodium chloride 0.9 % infusion 40 mL (has no administration in time range)   sennosides-docusate (PERICOLACE) 8.6-50 MG per tablet 2 tablet (2 tablets Oral Given 8/1/23 7188)     And   polyethylene glycol (MIRALAX) packet 17 g  (has no administration in time range)     And   bisacodyl (DULCOLAX) EC tablet 5 mg (has no administration in time range)     And   bisacodyl (DULCOLAX) suppository 10 mg (has no administration in time range)   dextrose (GLUTOSE) oral gel 15 g (has no administration in time range)   dextrose (D50W) (25 g/50 mL) IV injection 25 g (has no administration in time range)   glucagon (GLUCAGEN) injection 1 mg (has no administration in time range)   Insulin Lispro (humaLOG) injection 2-7 Units (2 Units Subcutaneous Given 8/1/23 1213)   acetaminophen (TYLENOL) tablet 650 mg (650 mg Oral Given 7/31/23 2036)   ondansetron (ZOFRAN) injection 4 mg (has no administration in time range)   LORazepam (ATIVAN) injection 0.5 mg (0.5 mg Intravenous Given 8/1/23 0233)   hydrALAZINE (APRESOLINE) injection 10 mg (has no administration in time range)   Pharmacy to dose vancomycin (has no administration in time range)   cefTRIAXone (ROCEPHIN) 2000 mg/100 mL 0.9% NS IVPB (MBP) (2,000 mg Intravenous New Bag 8/1/23 1214)   Pharmacy to Dose acyclovir (ZOVIRAX) (has no administration in time range)   acyclovir (ZOVIRAX) 860 mg in sodium chloride 0.9 % 250 mL IVPB (860 mg Intravenous New Bag 8/1/23 1215)   vancomycin 1250 mg/250 mL 0.9% NS IVPB ([facility]) (1,250 mg Intravenous New Bag 8/1/23 0517)   povidone-iodine 10 % swab 1 each (has no administration in time range)   sodium chloride 0.9 % bolus 1,000 mL (1,000 mL Intravenous New Bag 7/31/23 1251)   piperacillin-tazobactam (ZOSYN) 3.375 g in iso-osmotic dextrose 50 ml (premix) (0 g Intravenous Stopped 7/31/23 1325)   acetaminophen (TYLENOL) suppository 650 mg (650 mg Rectal Given 7/31/23 1254)   vancomycin 1750 mg/500 mL 0.9% NS IVPB ([facility]) (1,750 mg Intravenous New Bag 7/31/23 1511)   iopamidol (ISOVUE-300) 61 % injection 100 mL (85 mL Intravenous Given 7/31/23 1450)   lidocaine 1% - EPINEPHrine 1:631472 (XYLOCAINE W/EPI) 1 %-1:362084 injection 10 mL (10 mL Injection Given 7/31/23 1540)    haloperidol lactate (HALDOL) injection 5 mg (5 mg Intravenous Given 7/31/23 1537)   !Hold Anticoagulants for 24 Hours Prior to Lumbar Puncture (1 each Does not apply Given 8/1/23 1652)     ECG/EMG Results (last 24 hours)       Procedure Component Value Units Date/Time    SCANNED - TELEMETRY   [[id number]] Resulted: 07/31/23     Updated: 08/01/23 0553          SCANNED - TELEMETRY     Final Result      ECG 12 Lead QT Measurement    (Results Pending)   ECG 12 Lead QT Measurement    (Results Pending)              Medical Decision Making  Problems Addressed:  Altered mental status, unspecified altered mental status type: complicated acute illness or injury  Febrile illness: complicated acute illness or injury    Amount and/or Complexity of Data Reviewed  Labs: ordered.  Radiology: ordered.  ECG/medicine tests: ordered.    Risk  OTC drugs.  Prescription drug management.  Decision regarding hospitalization.        Final diagnoses:   Febrile illness   Altered mental status, unspecified altered mental status type       ED Disposition  ED Disposition       ED Disposition   Decision to Admit    Condition   --    Comment   Level of Care: Telemetry [5]   Diagnosis: AMS (altered mental status) [9620899]   Admitting Physician: AR BERNSTEIN [[id number]]   Certification: I Certify That Inpatient Hospital Services Are Medically Necessary For Greater Than 2 Midnights                 No follow-up provider specified.       Medication List        ASK your doctor about these medications      acetaminophen 325 MG tablet  Commonly known as: TYLENOL  Ask about: Which instructions should I use?                 Omkar Seth MD  08/01/23 8014

## 2023-07-31 NOTE — CASE MANAGEMENT/SOCIAL WORK
Discharge Planning Assessment  [facility]     Patient Name: Omkar Nava  MRN: [id number]  Today's Date: 7/31/2023    Admit Date: 7/31/2023    Plan: IDP   Discharge Needs Assessment       Row Name 07/31/23 1329       Living Environment    People in Home facility resident;other (see comments)  [facility]    Current Living Arrangements extended care facility    Potentially Unsafe Housing Conditions unable to assess    Primary Care Provided by other (see comments)  facility staff    Provides Primary Care For no one, unable/limited ability to care for self    Family Caregiver if Needed child(mable), adult    Family Caregiver Names Idalia Lerma    Quality of Family Relationships involved;helpful;supportive       Transition Planning    Patient/Family Anticipates Transition to inpatient rehabilitation facility;other (see comments)  Wants pt. to go to another LTC facility at d/c    Transportation Anticipated other (see comments)  may need assistance with transportation       Discharge Needs Assessment    Readmission Within the Last 30 Days no previous admission in last 30 days    Equipment Currently Used at Home wheelchair;walker, standard;cane, straight                   Discharge Plan       Row Name 07/31/23 1385       Plan    Plan IDP    Plan Comments MSW met with pt. and a family member at bedside. Pt. was asleep during visit. Pt. is currently residing at [facility]. Pt.'s insurance is Medicare and [place] Medicaid. Pt.'s family member reports that pt. is dependent of ADL's/IADL's and staff assists. Pt. has a w/c, walker, and cane. No O2. Pt. is doing PT and OT at facility. Pt.'s family member reports she doesn't want pt. to return to [facility] and would like pt. to go to another LTC facility. Pt. will likely need assistance with transportation. Pt. has an advanced directive and ACP documentation on file. CM will continue to follow pt. throughout his  stay.    Final Discharge Disposition Code 30 - still a patient                  Continued Care and Services - Admitted Since 7/31/2023    Coordination has not been started for this encounter.          Demographic Summary       Row Name 07/31/23 1328       General Information    Arrived From long-term care;other (see comments)  [facility]    Referral Source admission list;emergency department    Reason for Consult discharge planning    Preferred Language English                   Functional Status       Row Name 07/31/23 1328       Functional Status, IADL    Medications completely dependent    Meal Preparation completely dependent    Housekeeping completely dependent    Laundry completely dependent    Shopping completely dependent       Mental Status Summary    Recent Changes in Mental Status/Cognitive Functioning unable to assess       Employment/    Employment Status retired                   Psychosocial    No documentation.                  Abuse/Neglect    No documentation.                  Legal    No documentation.                  Substance Abuse    No documentation.                  Patient Forms    No documentation.                     LUCINDA Ferrell

## 2023-08-01 LAB
GLUCOSE BLDC GLUCOMTR-MCNC: 164 MG/DL (ref 70–130)
GLUCOSE BLDC GLUCOMTR-MCNC: 185 MG/DL (ref 70–130)
GLUCOSE BLDC GLUCOMTR-MCNC: 194 MG/DL (ref 70–130)
GLUCOSE BLDC GLUCOMTR-MCNC: 203 MG/DL (ref 70–130)

## 2023-08-01 PROCEDURE — 99233 SBSQ HOSP IP/OBS HIGH 50: CPT | Performed by: INTERNAL MEDICINE

## 2023-08-01 PROCEDURE — 25010000002 ACYCLOVIR PER 5 MG

## 2023-08-01 PROCEDURE — 63710000001 INSULIN LISPRO (HUMAN) PER 5 UNITS: Performed by: INTERNAL MEDICINE

## 2023-08-01 PROCEDURE — 25010000002 HYDRALAZINE PER 20 MG: Performed by: INTERNAL MEDICINE

## 2023-08-01 PROCEDURE — 25010000002 ENOXAPARIN PER 10 MG: Performed by: INTERNAL MEDICINE

## 2023-08-01 PROCEDURE — 25010000002 CEFTRIAXONE PER 250 MG: Performed by: INTERNAL MEDICINE

## 2023-08-01 PROCEDURE — 25010000002 VANCOMYCIN 10 G RECONSTITUTED SOLUTION

## 2023-08-01 PROCEDURE — 25010000002 LORAZEPAM PER 2 MG: Performed by: INTERNAL MEDICINE

## 2023-08-01 PROCEDURE — 25010000002 AMPICILLIN PER 500 MG: Performed by: INTERNAL MEDICINE

## 2023-08-01 PROCEDURE — 82948 REAGENT STRIP/BLOOD GLUCOSE: CPT

## 2023-08-01 RX ORDER — POVIDONE-IODINE 10 MG/ML
1 SOLUTION TOPICAL DAILY
Status: DISCONTINUED | OUTPATIENT
Start: 2023-08-01 | End: 2023-08-09 | Stop reason: HOSPADM

## 2023-08-01 RX ADMIN — ACYCLOVIR SODIUM 860 MG: 500 INJECTION, SOLUTION INTRAVENOUS at 20:33

## 2023-08-01 RX ADMIN — BRIMONIDINE TARTRATE 1 DROP: 2 SOLUTION OPHTHALMIC at 20:34

## 2023-08-01 RX ADMIN — DIVALPROEX SODIUM 250 MG: 250 TABLET, DELAYED RELEASE ORAL at 20:33

## 2023-08-01 RX ADMIN — Medication 5 MG: at 20:33

## 2023-08-01 RX ADMIN — AMPICILLIN SODIUM 2 G: 2 INJECTION, POWDER, FOR SOLUTION INTRAVENOUS at 03:59

## 2023-08-01 RX ADMIN — LORAZEPAM 0.5 MG: 0.5 TABLET ORAL at 20:33

## 2023-08-01 RX ADMIN — ACYCLOVIR SODIUM 860 MG: 500 INJECTION, SOLUTION INTRAVENOUS at 03:59

## 2023-08-01 RX ADMIN — ACYCLOVIR SODIUM 860 MG: 500 INJECTION, SOLUTION INTRAVENOUS at 12:15

## 2023-08-01 RX ADMIN — Medication 10 ML: at 20:34

## 2023-08-01 RX ADMIN — POVIDONE-IODINE 1 EACH: 10 SOLUTION TOPICAL at 17:11

## 2023-08-01 RX ADMIN — SODIUM CHLORIDE 2000 MG: 900 INJECTION INTRAVENOUS at 12:14

## 2023-08-01 RX ADMIN — HYDRALAZINE HYDROCHLORIDE 10 MG: 20 INJECTION INTRAMUSCULAR; INTRAVENOUS at 22:39

## 2023-08-01 RX ADMIN — HYDRALAZINE HYDROCHLORIDE 25 MG: 25 TABLET, FILM COATED ORAL at 14:17

## 2023-08-01 RX ADMIN — INSULIN LISPRO 2 UNITS: 100 INJECTION, SOLUTION INTRAVENOUS; SUBCUTANEOUS at 20:47

## 2023-08-01 RX ADMIN — ENOXAPARIN SODIUM 40 MG: 100 INJECTION SUBCUTANEOUS at 08:48

## 2023-08-01 RX ADMIN — THIAMINE HCL TAB 100 MG 100 MG: 100 TAB at 17:11

## 2023-08-01 RX ADMIN — FERROUS SULFATE TAB 325 MG (65 MG ELEMENTAL FE) 325 MG: 325 (65 FE) TAB at 08:49

## 2023-08-01 RX ADMIN — HYDRALAZINE HYDROCHLORIDE 25 MG: 25 TABLET, FILM COATED ORAL at 20:33

## 2023-08-01 RX ADMIN — AMPICILLIN SODIUM 2 G: 2 INJECTION, POWDER, FOR SOLUTION INTRAVENOUS at 00:31

## 2023-08-01 RX ADMIN — INSULIN LISPRO 2 UNITS: 100 INJECTION, SOLUTION INTRAVENOUS; SUBCUTANEOUS at 17:11

## 2023-08-01 RX ADMIN — THIAMINE HCL TAB 100 MG 100 MG: 100 TAB at 08:49

## 2023-08-01 RX ADMIN — SENNOSIDES AND DOCUSATE SODIUM 2 TABLET: 50; 8.6 TABLET ORAL at 20:33

## 2023-08-01 RX ADMIN — BRIMONIDINE TARTRATE 1 DROP: 2 SOLUTION OPHTHALMIC at 08:50

## 2023-08-01 RX ADMIN — FAMOTIDINE 20 MG: 20 TABLET, FILM COATED ORAL at 08:50

## 2023-08-01 RX ADMIN — AMPICILLIN SODIUM 2 G: 2 INJECTION, POWDER, FOR SOLUTION INTRAVENOUS at 11:44

## 2023-08-01 RX ADMIN — LISINOPRIL 40 MG: 40 TABLET ORAL at 08:50

## 2023-08-01 RX ADMIN — INSULIN LISPRO 2 UNITS: 100 INJECTION, SOLUTION INTRAVENOUS; SUBCUTANEOUS at 12:13

## 2023-08-01 RX ADMIN — FAMOTIDINE 20 MG: 20 TABLET, FILM COATED ORAL at 20:33

## 2023-08-01 RX ADMIN — Medication 1 EACH: at 16:52

## 2023-08-01 RX ADMIN — AMPICILLIN SODIUM 2 G: 2 INJECTION, POWDER, FOR SOLUTION INTRAVENOUS at 09:17

## 2023-08-01 RX ADMIN — INSULIN LISPRO 3 UNITS: 100 INJECTION, SOLUTION INTRAVENOUS; SUBCUTANEOUS at 08:48

## 2023-08-01 RX ADMIN — VANCOMYCIN HYDROCHLORIDE 1250 MG: 10 INJECTION, POWDER, LYOPHILIZED, FOR SOLUTION INTRAVENOUS at 05:17

## 2023-08-01 RX ADMIN — TIMOLOL MALEATE 1 DROP: 5 SOLUTION/ DROPS OPHTHALMIC at 08:50

## 2023-08-01 RX ADMIN — SENNOSIDES AND DOCUSATE SODIUM 2 TABLET: 50; 8.6 TABLET ORAL at 08:49

## 2023-08-01 RX ADMIN — BRIMONIDINE TARTRATE 1 DROP: 2 SOLUTION OPHTHALMIC at 17:11

## 2023-08-01 RX ADMIN — TIMOLOL MALEATE 1 DROP: 5 SOLUTION/ DROPS OPHTHALMIC at 20:34

## 2023-08-01 RX ADMIN — LORAZEPAM 0.5 MG: 2 INJECTION INTRAMUSCULAR; INTRAVENOUS at 02:33

## 2023-08-01 RX ADMIN — DIVALPROEX SODIUM 250 MG: 250 TABLET, DELAYED RELEASE ORAL at 08:50

## 2023-08-01 RX ADMIN — HYDROXYZINE HYDROCHLORIDE 50 MG: 25 TABLET, FILM COATED ORAL at 22:36

## 2023-08-01 RX ADMIN — THIAMINE HCL TAB 100 MG 100 MG: 100 TAB at 20:33

## 2023-08-01 NOTE — PROGRESS NOTES
[facility] Medicine Services  PROGRESS NOTE    Patient Name: Omkar Nava  : 1955  MRN: [id number]    Date of Admission: 2023  Primary Care Physician: Deann Cao MD    Subjective   Subjective     CC:  AMS    HPI:  More awake and alert per nurse today.  Patient still confused.      ROS:  Unable to assess d/t confusion    Objective   Objective     Vital Signs:   Temp:  [96.8 øF (36 øC)-99.8 øF (37.7 øC)] 97.3 øF (36.3 øC)  Heart Rate:  [] 62  Resp:  [18] 18  BP: (125-176)/(66-88) 165/74  Flow (L/min):  [1-2] 1     Physical Exam:  Constitutional: No acute distress, awake, alert  HENT: NCAT, mucous membranes moist  Respiratory: Clear to auscultation bilaterally, respiratory effort normal   Cardiovascular: RRR, no murmurs, rubs, or gallops  Gastrointestinal: Positive bowel sounds, soft, nontender, nondistended  Musculoskeletal: No bilateral ankle edema, amputated toes on left foot  Psychiatric: Appropriate affect, cooperative  Neurologic: Oriented x 1 (to person only), strength symmetric in all extremities, Cranial Nerves grossly intact to confrontation, speech clear  Skin: No rashes      Results Reviewed:  LAB RESULTS:      Lab 23  1231   WBC 11.55*   HEMOGLOBIN 9.3*   HEMATOCRIT 29.6*   PLATELETS 256   NEUTROS ABS 9.17*   IMMATURE GRANS (ABS) 0.06*   LYMPHS ABS 1.73   MONOS ABS 0.55   EOS ABS 0.01   MCV 88.9   PROCALCITONIN 0.05   LACTATE 1.1         Lab 23  1231   SODIUM 138   POTASSIUM 5.2   CHLORIDE 103   CO2 22.0   ANION GAP 13.0   BUN 22   CREATININE 1.23   EGFR 65.2   GLUCOSE 159*   CALCIUM 9.3   MAGNESIUM 1.8         Lab 23  1231   TOTAL PROTEIN 7.6   ALBUMIN 3.8   GLOBULIN 3.8   ALT (SGPT) 25   AST (SGOT) 25   BILIRUBIN <0.2   ALK PHOS 75   LIPASE 19                     Brief Urine Lab Results  (Last result in the past 365 days)        Color   Clarity   Blood   Leuk Est   Nitrite   Protein   CREAT   Urine HCG        23 1230  Yellow   Clear   Small (1+)   Negative   Negative   >=300 mg/dL (3+)                   Microbiology Results Abnormal       Procedure Component Value - Date/Time    Blood Culture - Blood, Hand, Left [[id number]]  (Normal) Collected: 07/31/23 1246    Lab Status: Preliminary result Specimen: Blood from Hand, Left Updated: 08/01/23 1315     Blood Culture No growth at 24 hours    Blood Culture - Blood, Arm, Right [[id number]]  (Normal) Collected: 07/31/23 1246    Lab Status: Preliminary result Specimen: Blood from Arm, Right Updated: 08/01/23 1315     Blood Culture No growth at 24 hours    Respiratory Panel PCR w/COVID-19(SARS-CoV-2) GIANNI/SIENNA/ANNA/PAD/COR/MAD/ASHIA In-House, NP Swab in UTM/VTM, 3-4 HR TAT - Swab, Nasopharynx [[id number]]  (Normal) Collected: 07/31/23 1328    Lab Status: Final result Specimen: Swab from Nasopharynx Updated: 07/31/23 1439     ADENOVIRUS, PCR Not Detected     Coronavirus 229E Not Detected     Coronavirus HKU1 Not Detected     Coronavirus NL63 Not Detected     Coronavirus OC43 Not Detected     COVID19 Not Detected     Human Metapneumovirus Not Detected     Human Rhinovirus/Enterovirus Not Detected     Influenza A PCR Not Detected     Influenza B PCR Not Detected     Parainfluenza Virus 1 Not Detected     Parainfluenza Virus 2 Not Detected     Parainfluenza Virus 3 Not Detected     Parainfluenza Virus 4 Not Detected     RSV, PCR Not Detected     Bordetella pertussis pcr Not Detected     Bordetella parapertussis PCR Not Detected     Chlamydophila pneumoniae PCR Not Detected     Mycoplasma pneumo by PCR Not Detected    Narrative:      In the setting of a positive respiratory panel with a viral infection PLUS a negative procalcitonin without other underlying concern for bacterial infection, consider observing off antibiotics or discontinuation of antibiotics and continue supportive care. If the respiratory panel is positive for atypical bacterial infection (Bordetella pertussis, Chlamydophila  pneumoniae, or Mycoplasma pneumoniae), consider antibiotic de-escalation to target atypical bacterial infection.            CT Head Without Contrast    Result Date: 7/31/2023  CT HEAD WO CONTRAST Date of Exam: 7/31/2023 2:33 PM EDT Indication: AMS. Comparison: 4/29/2022 MR brain Technique: Axial CT images were obtained of the head without contrast administration.  Automated exposure control and iterative construction methods were used. Findings: Parenchyma:No acute intraparenchymal hemorrhage. No loss of gray-white differentiation to suggest large territory infarct. Mild parenchymal volume loss. Scattered periventricular and subcortical white matter hypodensities, nonspecific, but most often consistent with small vessel ischemic changes. No midline shift or herniation. Ventricles and extra axial spaces:Prominent ventricles and sulci secondary to volume loss. No extra axial fluid collection seen. Other: Right lens replacement. Paranasal sinuses are clear. Mastoid air cells are clear. Calvarium is intact. Intracranial atherosclerotic calcification is present.     Impression: Impression: No evidence of acute hemorrhage or large territory infarct Chronic changes as above. Electronically Signed: Anil Krishnamurthy  7/31/2023 2:54 PM EDT  Workstation ID: [id number]    CT Abdomen Pelvis With Contrast    Result Date: 7/31/2023  CT ABDOMEN PELVIS W CONTRAST Date of Exam: 7/31/2023 2:33 PM EDT Indication: ams. Comparison: CT of the abdomen and pelvis dated 5/6/2017 Technique: Axial CT images were obtained of the abdomen and pelvis following the uneventful intravenous administration of 85 mL Isovue-300. Reconstructed coronal and sagittal images were also obtained. Automated exposure control and iterative construction methods were used. Findings: Examination is limited due to motion artifact. Liver: The liver is unremarkable in morphology. No focal liver lesion is seen. No biliary dilation is seen. Gallbladder: Gallbladder is not  well-visualized due to motion artifact. Pancreas: Unremarkable. Spleen: Unremarkable. Adrenal glands: Unremarkable. Genitourinary tract: Kidneys and ureters appear unremarkable. Bladder wall thickening may be related to underdistention or cystitis. Prostate gland is unremarkable. Gastrointestinal tract: Moderate colonic stool is present. Scattered colonic diverticula are seen. No findings to suggest bowel obstruction. Appendix: No findings to suggest acute appendicitis. Other findings: No free air or free fluid is identified. No pathologically enlarged lymph nodes are seen. Vascular calcifications are seen. IVC is unremarkable. Bones and soft tissues: No acute osseous lesion is identified. There are mild degenerative changes of the spine. There is 10 mm anterolisthesis at L4-L5. Superficial soft tissues demonstrate no acute abnormality. Lung bases: Scattered bibasilar atelectasis.     Impression: Impression: 1.Examination is limited due to motion artifact. 2.Given this limitation, no acute abnormality identified within the abdomen or pelvis. 3.Moderate colonic stool. 4.Bladder wall thickening may be related to underdistention or cystitis. Please correlate with urinalysis. 5.Additional findings as detailed above. Electronically Signed: Ryan Perales  7/31/2023 2:59 PM EDT  Workstation ID: [id number]    XR Chest 1 View    Result Date: 7/31/2023  XR CHEST 1 VW Date of Exam: 7/31/2023 1:02 PM EDT Indication: AMS Comparison: 12/10/2022 Findings: Heart shadow is mildly enlarged. Vasculature is cephalized. Minimal if any perihilar edema present; overall pattern is similar to the prior study. There may be a small area of focal atelectasis in the medial left lung base. No other focal lung disease, effusion or pneumothorax is seen.     Impression: Impression: Moderate pulmonary venous hypertension. Small focus of left lower lobe atelectasis. Electronically Signed: Wisam Oquendo  7/31/2023 1:35 PM EDT  Workstation ID: [id number]          Current medications:  Scheduled Meds:acyclovir, 10 mg/kg, Intravenous, Q8H  brimonidine, 1 drop, Both Eyes, TID   And  timolol, 1 drop, Both Eyes, BID  cefTRIAXone, 2,000 mg, Intravenous, Q12H  divalproex, 250 mg, Oral, BID  famotidine, 20 mg, Oral, BID  ferrous sulfate, 325 mg, Oral, Daily With Breakfast  hydrALAZINE, 25 mg, Oral, Q8H  insulin lispro, 2-7 Units, Subcutaneous, 4x Daily AC & at Bedtime  lisinopril, 40 mg, Oral, Daily  LORazepam, 0.5 mg, Oral, Nightly  melatonin, 5 mg, Oral, Nightly  senna-docusate sodium, 2 tablet, Oral, BID  sodium chloride, 10 mL, Intravenous, Q12H  thiamine, 100 mg, Oral, TID  vancomycin, 15 mg/kg, Intravenous, Q18H      Continuous Infusions:Pharmacy to Dose acyclovir (ZOVIRAX),   Pharmacy to dose vancomycin,       PRN Meds:.  acetaminophen    senna-docusate sodium **AND** polyethylene glycol **AND** bisacodyl **AND** bisacodyl    dextrose    dextrose    glucagon (human recombinant)    hydrALAZINE    hydrOXYzine    LORazepam    ondansetron    Pharmacy to Dose acyclovir (ZOVIRAX)    Pharmacy to dose vancomycin    [COMPLETED] Insert Peripheral IV **AND** sodium chloride    sodium chloride    sodium chloride    ziprasidone    Assessment & Plan   Assessment & Plan     Active Hospital Problems    Diagnosis  POA    **AMS (altered mental status) [R41.82]  Yes    S/P transmetatarsal amputation of foot, left [Z89.432]  Not Applicable    Neurocognitive disorder [R41.9]  Yes    Type 2 diabetes mellitus [E11.9]  Yes    Essential hypertension [I10]  Yes    Psychotic disorder [F29]  Yes      Resolved Hospital Problems   No resolved problems to display.        Brief Hospital Course to date:  Omkar Nava is a 67 y.o. male with PMHx significant for dementia w/psychosis, DMII, HTN, polysubstance abuse, osteomyelitis L foot s/p left transmetatarsal amputation 12/2022 who currently resides in nursing home who presented with AMS and fever.     AMS   Fever/Sepsis - concern for CNS  infection  - empirically cover for meningitis for now given AMS/headache and fevers, will use ampicillin, CTX, Vancomycin, acyclovir  - LP attempted in the ER as well as IR without success -mostly due to agitation  - infectious work-up including UA, resp PCR, CT abd/pelvis, CT head, CXR unremarkable for clear source of fever  - could consider partially treated cystitis as etiology, however UA is bland  - de-escalate abx as able, re-attempt LP in the AM (can't do today because was given lovenox)  - consult ID     Dementia w/Psychosis:  - PRN geodon and ativan for agtitation  - continue home oral regimen when okay to take PO - appears he is on depakote, PRN atarax and nightly scheduled ativan     DMII  - SSI coverage for now     HTN:  - continue home regimen, PRN IV medications          Expected Discharge Location and Transportation:   Expected Discharge   Expected Discharge Date: 8/4/2023; Expected Discharge Time:      DVT prophylaxis:  No DVT prophylaxis order currently exists.          CODE STATUS:   Code Status and Medical Interventions:   Ordered at: 07/31/23 1801     Level Of Support Discussed With:    Health Care Surrogate     Code Status (Patient has no pulse and is not breathing):    CPR (Attempt to Resuscitate)     Medical Interventions (Patient has pulse or is breathing):    Full Support     Release to patient:    Routine Release       Yash Danielson MD  08/01/23

## 2023-08-01 NOTE — PLAN OF CARE
Goal Outcome Evaluation:  Plan of Care Reviewed With: patient        Progress: no change  Outcome Evaluation: Patient agitated with care this shift. Pt did allow skin assessment approx 0230 this AM. Pt incontinent of bladder and bowel at this time. 1x dose PRN ativan given this shift for agitation. Otherwise, pt rested well this shift. VSS-- Tmax 99.8; Tylenol given per daughter's request. Pt afebrile for remainder of shift. Abx/antivirals given as ordered. Continue current POC.

## 2023-08-01 NOTE — NURSING NOTE
Reason for Wound, Ostomy and Continence (WOC) Nursing Consult: Right great toe and possibly right anterior ankle    Patient in bed, alert at times and seems to be confused.  Family/support person at bedside.        Identified evolving deep tissue pressure injuries to patient's right lateral fifth toe and right medial foot.  Wound bed to patient's fifth toe is nonblanchable, black/dark purple, maroon, appears as a blood blister that is being reabsorbed.  Wound bed to right medial foot is red/maroon, nonblanchable; etiology of this wound is either evolving deep tissue pressure injury versus arterial insufficiency ulcer versus mixed etiology.  Periwound skin is blanchable.  No drainage noted.  Right lateral fifth toe    Right medial foot      Identified hemosiderin staining and venous insufficiency to patient's bilateral anterior lower legs.  Skin is darkened, flaky and dry.  Patient does have a scab to his right lower leg which may be from a venous insufficiency ulcer or could be from an abrasion.  Cleansed with normal saline and will request lotion as patient's family member stated that he usually does have lotion put on him daily.  Right lower anterior leg      Identified healing abrasion with epithelialization and pale pink skin to patient's right anterior ankle.  Wound bed is blanchable, dry.  Right anterior ankle      Sensory Perception: 3-->slightly limited  Moisture: 3-->occasionally moist  Activity: 3-->walks occasionally  Mobility: 3-->slightly limited  Nutrition: 3-->adequate  Friction and Shear: 2-->potential problem  James Score: 17 (08/01/23 0872)       Please implement pressure injury prevention interventions per protocol for patients with a James scale of 18 or less including inflating waffle topper.    See orders for recommendations.    Thank you for consulting the [facility] Nurse.  [facility] Team will plan to follow-up.    If alteration to skin integrity or change in wound bed presentation, please consult [facility]  team.    Please note that parts of this note were completed with a voice recognition program. Review of the dictation was done, however, occasionally words are mistranscribed.

## 2023-08-01 NOTE — CONSULTS
[facility]    INFECTIOUS DISEASE CONSULT/INITIAL HOSPITAL VISIT    Omkar Nava  1955  [id number]    Date of consult: 8/1/2023    Admit date: 7/31/2023    Requesting Provider: No ref. provider found  Evaluating physician: Rayray Gordon MD  Reason for Consultation: Fever, sepsis, confusion  Chief Complaint: Above      Subjective   History of present illness:  Patient is a  67 y.o.  Yr old male With a history of diabetes mellitus type 2, peripheral neuropathy, history of alcohol and cocaine and marijuana use, essential hypertension, GERD, psychiatric issues in the past, left foot osteomyelitis status post transmetatarsal amputation December 2022, status post IV antibiotics and oral which completed around January 2023.  Patient was at the nursing home and became increasingly confused and was brought in by daughter and admitted to [facility] on 7/31/2023.  Patient is a poor historian.  He was documented to have a fever of greater than 102.  Urinalysis had hematuria without significant pyuria.  Chest x-ray said possible left lower lobe atelectasis.  CT scan of the brain without acute changes.  CT scan of the abdomen and pelvis with some bladder wall thickening.  I was consulted on 8/1/2023 for further evaluation and treatment.    Past Medical History:   Diagnosis Date    Anemia     Dementia     Diabetes mellitus     Dysphagia     GERD (gastroesophageal reflux disease)     History of alcohol abuse     History of cocaine use     History of marijuana use     Hypertension     Osteomyelitis     Poor historian     records obtained from nursing home records & his family    Visual impairment        Past Surgical History:   Procedure Laterality Date    AMPUTATION FOOT Left 10/18/2022    Procedure: PARTIAL FIRST RAY AMPUTATION LEFT;  Surgeon: Yeison Petty MD;  Location: [facility];  Service: Orthopedics;  Laterality: Left;    AMPUTATION FOOT Left 12/5/2022    Procedure:  Transmetatarsal of Left Foot;  Surgeon: Yeison Petty MD;  Location: [facility];  Service: Orthopedics;  Laterality: Left;    EYE SURGERY         Pediatric History   Patient Parents    Not on file     Other Topics Concern    Not on file   Social History Narrative    Caffeine 0-1 servings per day    Patient lives at home .   Nursing home resident, no current alcohol or drug use or cigarette use    family history includes Diabetes in his brother, brother, father, maternal grandfather, maternal grandmother, mother, and sister; Hypertension in his brother, brother, father, and mother.    No Known Allergies    Immunization History   Administered Date(s) Administered    COVID-19 (MODERNA) 1st,2nd,3rd Dose Monovalent 06/09/2021, 07/19/2021    COVID-19 (MODERNA) Monovalent Original Booster 04/11/2022       Medication:    Current Facility-Administered Medications:     acetaminophen (TYLENOL) tablet 650 mg, 650 mg, Oral, Q4H PRN, Sarah Gann, , 650 mg at 07/31/23 2036    acyclovir (ZOVIRAX) 860 mg in sodium chloride 0.9 % 250 mL IVPB, 10 mg/kg, Intravenous, Q8H, Blaise Miranda IV, PharmD, 860 mg at 08/01/23 1215    ampicillin 2000 mg/100 mL 0.9% NS (MBP), 2 g, Intravenous, Q4H, Sarah Gann, DO, 2 g at 08/01/23 1144    sennosides-docusate (PERICOLACE) 8.6-50 MG per tablet 2 tablet, 2 tablet, Oral, BID, 2 tablet at 08/01/23 0849 **AND** polyethylene glycol (MIRALAX) packet 17 g, 17 g, Oral, Daily PRN **AND** bisacodyl (DULCOLAX) EC tablet 5 mg, 5 mg, Oral, Daily PRN **AND** bisacodyl (DULCOLAX) suppository 10 mg, 10 mg, Rectal, Daily PRN, Sarah Gann, DO    brimonidine (ALPHAGAN) 0.2 % ophthalmic solution 1 drop, 1 drop, Both Eyes, TID, 1 drop at 08/01/23 0850 **AND** timolol (TIMOPTIC) 0.5 % ophthalmic solution 1 drop, 1 drop, Both Eyes, BID, Sarah Gann, DO, 1 drop at 08/01/23 0850    cefTRIAXone (ROCEPHIN) 2000 mg/100 mL 0.9% NS IVPB (MBP), 2,000 mg, Intravenous, Q12H, Sarah Gann DO,  2,000 mg at 08/01/23 1214    dextrose (D50W) (25 g/50 mL) IV injection 25 g, 25 g, Intravenous, Q15 Min PRN, Sarah Gann, DO    dextrose (GLUTOSE) oral gel 15 g, 15 g, Oral, Q15 Min PRN, Sarah Gann R, DO    divalproex (DEPAKOTE) DR tablet 250 mg, 250 mg, Oral, BID, Sarah Gann, DO, 250 mg at 08/01/23 0850    famotidine (PEPCID) tablet 20 mg, 20 mg, Oral, BID, Sarah Gann, DO, 20 mg at 08/01/23 0850    ferrous sulfate tablet 325 mg, 325 mg, Oral, Daily With Breakfast, Sarah Gann, DO, 325 mg at 08/01/23 0849    glucagon (GLUCAGEN) injection 1 mg, 1 mg, Intramuscular, Q15 Min PRN, Sarah Gann, DO    hydrALAZINE (APRESOLINE) injection 10 mg, 10 mg, Intravenous, Q6H PRN, Sarah Gann,     hydrALAZINE (APRESOLINE) tablet 25 mg, 25 mg, Oral, Q8H, Sarah Gann, DO, 25 mg at 07/31/23 2029    hydrOXYzine (ATARAX) tablet 50 mg, 50 mg, Oral, Q6H PRN, Sarah Gann R, DO    Insulin Lispro (humaLOG) injection 2-7 Units, 2-7 Units, Subcutaneous, 4x Daily AC & at Bedtime, Sarah Gann, DO, 2 Units at 08/01/23 1213    lisinopril (PRINIVIL,ZESTRIL) tablet 40 mg, 40 mg, Oral, Daily, Sarah Gann R, DO, 40 mg at 08/01/23 0850    LORazepam (ATIVAN) injection 0.5 mg, 0.5 mg, Intravenous, Q4H PRN, Sarah Gann R, DO, 0.5 mg at 08/01/23 0233    LORazepam (ATIVAN) tablet 0.5 mg, 0.5 mg, Oral, Nightly, Sarah Gann, DO, 0.5 mg at 07/31/23 2026    melatonin tablet 5 mg, 5 mg, Oral, Nightly, Sarah Gann DO, 5 mg at 07/31/23 2026    ondansetron (ZOFRAN) injection 4 mg, 4 mg, Intravenous, Q6H PRN, Sarah Gann,     Pharmacy to Dose acyclovir (ZOVIRAX), , Does not apply, Continuous PRN, Sarah Gann DO    Pharmacy to dose vancomycin, , Does not apply, Continuous PRN, Sarah Gann DO    [COMPLETED] Insert Peripheral IV, , , Once **AND** sodium chloride 0.9 % flush 10 mL, 10 mL, Intravenous, PRN, Sarah Gann,     sodium chloride 0.9 % flush 10 mL, 10  "mL, Intravenous, Q12H, Sarah Gann DO, 10 mL at 07/31/23 2026    sodium chloride 0.9 % flush 10 mL, 10 mL, Intravenous, PRN, Sarah Gann DO    sodium chloride 0.9 % infusion 40 mL, 40 mL, Intravenous, PRN, Sarah Gann DO    thiamine (VITAMIN B-1) tablet 100 mg, 100 mg, Oral, TID, Sarah Gann DO, 100 mg at 08/01/23 0849    vancomycin 1250 mg/250 mL 0.9% NS IVPB ([facility]), 15 mg/kg, Intravenous, Q18H, Blaise Miranda IV, PharmD, 1,250 mg at 08/01/23 0517    ziprasidone (GEODON) injection 10 mg, 10 mg, Intramuscular, Q4H PRN, Sarah Gann DO    Please refer to the medical record for a full medication list    Review of Systems: Unable to obtain secondary to mental status.      Physical Exam:   Vital Signs   Temp:  [96.8 øF (36 øC)-99.8 øF (37.7 øC)] 97.3 øF (36.3 øC)  Heart Rate:  [] 62  Resp:  [18] 18  BP: (125-176)/(66-88) 165/74    Temp  Min: 96.8 øF (36 øC)  Max: 99.8 øF (37.7 øC)  BP  Min: 125/66  Max: 176/76  Pulse  Min: 62  Max: 101  Resp  Min: 18  Max: 18  SpO2  Min: 88 %  Max: 100 %    Blood pressure 165/74, pulse 62, temperature 97.3 øF (36.3 øC), temperature source Axillary, resp. rate 18, height 182.9 cm (72\"), weight 85.7 kg (189 lb), SpO2 99 %.  GENERAL: Awake and Confused, in Moderate distress. Appears older than stated age.  HEENT:  Normocephalic, atraumatic.  Oropharynx without thrush. Dentition in good repair. No cervical adenopathy. No neck masses.  Ears externally normal, Nose externally normal.  EYES: PERRL. No conjunctival injection. No icterus. EOM full.  LYMPHATICS: No lymphadenopathy of the neck or axillary or inguinal regions.   HEART: No murmur, gallop, or pericardial friction rub. Reg rate rhythm, No JVD at 45 degrees.  LUNGS: Few crackles left base. No respiratory distress, no use of accessory muscles.  ABDOMEN: Soft, nontender, nondistended. No appreciable HSM.  Bowel sounds normal.  GENITAL: No external lesions, breasts without masses, back straight, " no CVAT, rectal external without lesions.   SKIN: Warm and dry without cutaneous eruptions.  No nodules.    PSYCHIATRIC: Mental status lucid. No confusion.  EXT:  No cellulitic change.  NEURO: Oriented to name, CN 2 to 12 intact, DTR 1 + and symmetric, sensory intact to LT upper and lower extremitiy, motor 5/5 upper and lower extremity, cerebellar and gait not tested.      Results Review:   I reviewed the patient's new clinical results.  I reviewed the patient's new imaging results and agree with the interpretation.  I reviewed the patient's other test results and agree with the interpretation    Results from last 7 days   Lab Units 07/31/23  1231   WBC 10*3/mm3 11.55*   HEMOGLOBIN g/dL 9.3*   HEMATOCRIT % 29.6*   PLATELETS 10*3/mm3 256     Results from last 7 days   Lab Units 07/31/23  1231   SODIUM mmol/L 138   POTASSIUM mmol/L 5.2   CHLORIDE mmol/L 103   CO2 mmol/L 22.0   BUN mg/dL 22   CREATININE mg/dL 1.23   GLUCOSE mg/dL 159*   CALCIUM mg/dL 9.3     Results from last 7 days   Lab Units 07/31/23  1231   ALK PHOS U/L 75   BILIRUBIN mg/dL <0.2   ALT (SGPT) U/L 25   AST (SGOT) U/L 25                 Results from last 7 days   Lab Units 07/31/23  1231   LACTATE mmol/L 1.1     Estimated Creatinine Clearance: 72.6 mL/min (by C-G formula based on SCr of 1.23 mg/dL).  CPK          12/7/2022    08:11   Common Labsle   Creatine Kinase 231       Procalitonin Results:      Lab 07/31/23  1231   PROCALCITONIN 0.05      Brief Urine Lab Results  (Last result in the past 365 days)        Color   Clarity   Blood   Leuk Est   Nitrite   Protein   CREAT   Urine HCG        07/31/23 1230 Yellow   Clear   Small (1+)   Negative   Negative   >=300 mg/dL (3+)                  No results found for: SITE, ALLENTEST, PHART, VXN0ASY, PO2ART, HHS6WJJ, BASEEXCESS, V8ZXFVGH, HGBBG, HCTABG, OXYHEMOGLOBI, METHHGBN, CARBOXYHGB, CO2CT, BAROMETRIC, MODALITY, FIO2     Microbiology:  Blood Culture   Date Value Ref Range Status   07/31/2023 No growth  at 24 hours  Preliminary   07/31/2023 No growth at 24 hours  Preliminary     No results found for: BCIDPCR, CXREFLEX, CSFCX, CULTURETIS  No results found for: CULTURES, HSVCX, URCX  No results found for: EYECULTURE, GCCX, HSVCULTURE, LABHSV  No results found for: LEGIONELLA, MRSACX, MUMPSCX, MYCOPLASCX  No results found for: NOCARDIACX, STOOLCX  No results found for: THROATCX, UNSTIMCULT, URINECX, CULTURE, VZVCULTUR  No results found for: VIRALCULTU, WOUNDCX     Blood Culture   Date Value Ref Range Status   07/31/2023 No growth at 24 hours  Preliminary   07/31/2023 No growth at 24 hours  Preliminary   (      Radiology:  Imaging Results (Last 72 Hours)       Procedure Component Value Units Date/Time    IR LUMBAR PUNCTURE DIAGNOSTIC [[id number]] Resulted: 07/31/23 1806     Updated: 07/31/23 1809    CT Abdomen Pelvis With Contrast [[id number]] Collected: 07/31/23 1452     Updated: 07/31/23 1502    Narrative:      CT ABDOMEN PELVIS W CONTRAST    Date of Exam: 7/31/2023 2:33 PM EDT    Indication: ams.    Comparison: CT of the abdomen and pelvis dated 5/6/2017    Technique: Axial CT images were obtained of the abdomen and pelvis following the uneventful intravenous administration of 85 mL Isovue-300. Reconstructed coronal and sagittal images were also obtained. Automated exposure control and iterative   construction methods were used.    Findings:    Examination is limited due to motion artifact.    Liver: The liver is unremarkable in morphology. No focal liver lesion is seen. No biliary dilation is seen.    Gallbladder: Gallbladder is not well-visualized due to motion artifact.    Pancreas: Unremarkable.    Spleen: Unremarkable.    Adrenal glands: Unremarkable.    Genitourinary tract: Kidneys and ureters appear unremarkable. Bladder wall thickening may be related to underdistention or cystitis. Prostate gland is unremarkable.    Gastrointestinal tract: Moderate colonic stool is present. Scattered colonic diverticula are  seen. No findings to suggest bowel obstruction.    Appendix: No findings to suggest acute appendicitis.    Other findings: No free air or free fluid is identified. No pathologically enlarged lymph nodes are seen. Vascular calcifications are seen. IVC is unremarkable.    Bones and soft tissues: No acute osseous lesion is identified. There are mild degenerative changes of the spine. There is 10 mm anterolisthesis at L4-L5. Superficial soft tissues demonstrate no acute abnormality.    Lung bases: Scattered bibasilar atelectasis.      Impression:      Impression:  1.Examination is limited due to motion artifact.  2.Given this limitation, no acute abnormality identified within the abdomen or pelvis.  3.Moderate colonic stool.  4.Bladder wall thickening may be related to underdistention or cystitis. Please correlate with urinalysis.  5.Additional findings as detailed above.    Electronically Signed: Ryan Perales    7/31/2023 2:59 PM EDT    Workstation ID: [id number]    CT Head Without Contrast [[id number]] Collected: 07/31/23 1452     Updated: 07/31/23 1457    Narrative:      CT HEAD WO CONTRAST    Date of Exam: 7/31/2023 2:33 PM EDT    Indication: AMS.    Comparison: 4/29/2022 MR brain    Technique: Axial CT images were obtained of the head without contrast administration.  Automated exposure control and iterative construction methods were used.      Findings:  Parenchyma:No acute intraparenchymal hemorrhage. No loss of gray-white differentiation to suggest large territory infarct. Mild parenchymal volume loss. Scattered periventricular and subcortical white matter hypodensities, nonspecific, but most often   consistent with small vessel ischemic changes. No midline shift or herniation.    Ventricles and extra axial spaces:Prominent ventricles and sulci secondary to volume loss. No extra axial fluid collection seen.    Other: Right lens replacement. Paranasal sinuses are clear. Mastoid air cells are clear. Calvarium is  intact. Intracranial atherosclerotic calcification is present.      Impression:      Impression:  No evidence of acute hemorrhage or large territory infarct    Chronic changes as above.        Electronically Signed: Anil Dsouzaorty    7/31/2023 2:54 PM EDT    Workstation ID: [id number]    XR Chest 1 View [[id number]] Collected: 07/31/23 1332     Updated: 07/31/23 1338    Narrative:      XR CHEST 1 VW    Date of Exam: 7/31/2023 1:02 PM EDT    Indication: AMS    Comparison: 12/10/2022    Findings:  Heart shadow is mildly enlarged. Vasculature is cephalized. Minimal if any perihilar edema present; overall pattern is similar to the prior study. There may be a small area of focal atelectasis in the medial left lung base. No other focal lung disease,   effusion or pneumothorax is seen.      Impression:      Impression:  Moderate pulmonary venous hypertension. Small focus of left lower lobe atelectasis.      Electronically Signed: Wisam Oquendo    7/31/2023 1:35 PM EDT    Workstation ID: [id number]            IMPRESSION:     1.  Sepsis, present on admission, sources could include a cystitis with hematuria but no significant pyuria, really aspiration pneumonia left lower lobe versus atelectasis, possible encephalitis not seen on CT scan, and unable to get lumbar puncture secondary to lack of cooperation, versus other.  Usual organisms would include Staphylococcus, gram-negative rods, HSV.  2.  Encephalopathy, toxic and metabolic.  Also positive drug screen for benzodiazepines.  3.  Leukocytosis, neutrophilic related to above issues.  4.  Anemia, chronic disease.  5.  Hematuria, related to cystitis versus other.  6.  Diabetes mellitus type 2 with increased risk for infection.    RECOMMENDATIONS:    1.  Diagnostically, continue to follow patient's physical exam, CBC, CMP, CRP, lumbar puncture if able for cells and differential, glucose and protein, Gram stain culture and sensitivity, meningitis encephalitis PCR panel.  2.   Therapeutically, continue vancomycin, ceftriaxone, acyclovir awaiting further culture data.  Duration to be determined.  3.  Supportive care.    I discussed the patient's findings and my recommendations with patient and nursing staff    Thank you for asking me to see Omkar Nava.  Our group would be pleased to follow this patient over the course of their hospitalization and assist with outpatient antimicrobial therapy, as indicated.  Further recommendations depend on the results of the cultures and clinical course.  Increased risk for adverse drug reactions, complications of IV access, readmission.    Rayray Gordon MD  8/1/2023

## 2023-08-02 LAB
ALBUMIN SERPL-MCNC: 3.2 G/DL (ref 3.5–5.2)
ALBUMIN/GLOB SERPL: 1 G/DL
ALP SERPL-CCNC: 65 U/L (ref 39–117)
ALT SERPL W P-5'-P-CCNC: 19 U/L (ref 1–41)
ANION GAP SERPL CALCULATED.3IONS-SCNC: 10 MMOL/L (ref 5–15)
ANION GAP SERPL CALCULATED.3IONS-SCNC: 10 MMOL/L (ref 5–15)
AST SERPL-CCNC: 17 U/L (ref 1–40)
BACTERIA UR QL AUTO: ABNORMAL /HPF
BASOPHILS # BLD AUTO: 0.03 10*3/MM3 (ref 0–0.2)
BASOPHILS NFR BLD AUTO: 0.4 % (ref 0–1.5)
BILIRUB SERPL-MCNC: <0.2 MG/DL (ref 0–1.2)
BILIRUB UR QL STRIP: NEGATIVE
BUN SERPL-MCNC: 20 MG/DL (ref 8–23)
BUN SERPL-MCNC: 20 MG/DL (ref 8–23)
BUN/CREAT SERPL: 15.3 (ref 7–25)
BUN/CREAT SERPL: 15.3 (ref 7–25)
CALCIUM SPEC-SCNC: 8.7 MG/DL (ref 8.6–10.5)
CALCIUM SPEC-SCNC: 8.7 MG/DL (ref 8.6–10.5)
CHLORIDE SERPL-SCNC: 106 MMOL/L (ref 98–107)
CHLORIDE SERPL-SCNC: 106 MMOL/L (ref 98–107)
CK SERPL-CCNC: 463 U/L (ref 20–200)
CLARITY UR: CLEAR
CO2 SERPL-SCNC: 24 MMOL/L (ref 22–29)
CO2 SERPL-SCNC: 24 MMOL/L (ref 22–29)
COLOR UR: YELLOW
CREAT SERPL-MCNC: 1.31 MG/DL (ref 0.76–1.27)
CREAT SERPL-MCNC: 1.31 MG/DL (ref 0.76–1.27)
CRP SERPL-MCNC: 0.59 MG/DL (ref 0–0.5)
CRYPTOC AG CSF QL: NEGATIVE
D-LACTATE SERPL-SCNC: 0.8 MMOL/L (ref 0.5–2)
DEPRECATED RDW RBC AUTO: 43.7 FL (ref 37–54)
EGFRCR SERPLBLD CKD-EPI 2021: 60.4 ML/MIN/1.73
EGFRCR SERPLBLD CKD-EPI 2021: 60.4 ML/MIN/1.73
EOSINOPHIL # BLD AUTO: 0.18 10*3/MM3 (ref 0–0.4)
EOSINOPHIL NFR BLD AUTO: 2.1 % (ref 0.3–6.2)
ERYTHROCYTE [DISTWIDTH] IN BLOOD BY AUTOMATED COUNT: 13.7 % (ref 12.3–15.4)
GLOBULIN UR ELPH-MCNC: 3.2 GM/DL
GLUCOSE BLDC GLUCOMTR-MCNC: 128 MG/DL (ref 70–130)
GLUCOSE BLDC GLUCOMTR-MCNC: 181 MG/DL (ref 70–130)
GLUCOSE BLDC GLUCOMTR-MCNC: 224 MG/DL (ref 70–130)
GLUCOSE BLDC GLUCOMTR-MCNC: 352 MG/DL (ref 70–130)
GLUCOSE SERPL-MCNC: 150 MG/DL (ref 65–99)
GLUCOSE SERPL-MCNC: 150 MG/DL (ref 65–99)
GLUCOSE UR STRIP-MCNC: NEGATIVE MG/DL
HCT VFR BLD AUTO: 27.6 % (ref 37.5–51)
HGB BLD-MCNC: 8.9 G/DL (ref 13–17.7)
HGB UR QL STRIP.AUTO: NEGATIVE
HYALINE CASTS UR QL AUTO: ABNORMAL /LPF
IMM GRANULOCYTES # BLD AUTO: 0.03 10*3/MM3 (ref 0–0.05)
IMM GRANULOCYTES NFR BLD AUTO: 0.4 % (ref 0–0.5)
KETONES UR QL STRIP: NEGATIVE
LEUKOCYTE ESTERASE UR QL STRIP.AUTO: NEGATIVE
LYMPHOCYTES # BLD AUTO: 1.68 10*3/MM3 (ref 0.7–3.1)
LYMPHOCYTES NFR BLD AUTO: 19.9 % (ref 19.6–45.3)
MCH RBC QN AUTO: 28.3 PG (ref 26.6–33)
MCHC RBC AUTO-ENTMCNC: 32.2 G/DL (ref 31.5–35.7)
MCV RBC AUTO: 87.9 FL (ref 79–97)
MONOCYTES # BLD AUTO: 0.82 10*3/MM3 (ref 0.1–0.9)
MONOCYTES NFR BLD AUTO: 9.7 % (ref 5–12)
NEUTROPHILS NFR BLD AUTO: 5.72 10*3/MM3 (ref 1.7–7)
NEUTROPHILS NFR BLD AUTO: 67.5 % (ref 42.7–76)
NITRITE UR QL STRIP: NEGATIVE
NRBC BLD AUTO-RTO: 0 /100 WBC (ref 0–0.2)
PH UR STRIP.AUTO: 5.5 [PH] (ref 5–8)
PLATELET # BLD AUTO: 236 10*3/MM3 (ref 140–450)
PMV BLD AUTO: 11.1 FL (ref 6–12)
POTASSIUM SERPL-SCNC: 4.2 MMOL/L (ref 3.5–5.2)
POTASSIUM SERPL-SCNC: 4.2 MMOL/L (ref 3.5–5.2)
PROCALCITONIN SERPL-MCNC: 0.09 NG/ML (ref 0–0.25)
PROT SERPL-MCNC: 6.4 G/DL (ref 6–8.5)
PROT UR QL STRIP: ABNORMAL
RBC # BLD AUTO: 3.14 10*6/MM3 (ref 4.14–5.8)
RBC # UR STRIP: ABNORMAL /HPF
REF LAB TEST METHOD: ABNORMAL
SODIUM SERPL-SCNC: 140 MMOL/L (ref 136–145)
SODIUM SERPL-SCNC: 140 MMOL/L (ref 136–145)
SP GR UR STRIP: 1.02 (ref 1–1.03)
SQUAMOUS #/AREA URNS HPF: ABNORMAL /HPF
UROBILINOGEN UR QL STRIP: ABNORMAL
VANCOMYCIN SERPL-MCNC: 21.5 MCG/ML (ref 5–40)
WBC # UR STRIP: ABNORMAL /HPF
WBC NRBC COR # BLD: 8.46 10*3/MM3 (ref 3.4–10.8)

## 2023-08-02 PROCEDURE — 63710000001 INSULIN LISPRO (HUMAN) PER 5 UNITS: Performed by: INTERNAL MEDICINE

## 2023-08-02 PROCEDURE — 82948 REAGENT STRIP/BLOOD GLUCOSE: CPT

## 2023-08-02 PROCEDURE — 87899 AGENT NOS ASSAY W/OPTIC: CPT | Performed by: INTERNAL MEDICINE

## 2023-08-02 PROCEDURE — 80053 COMPREHEN METABOLIC PANEL: CPT

## 2023-08-02 PROCEDURE — 99232 SBSQ HOSP IP/OBS MODERATE 35: CPT | Performed by: INTERNAL MEDICINE

## 2023-08-02 PROCEDURE — 25010000002 LORAZEPAM PER 2 MG: Performed by: INTERNAL MEDICINE

## 2023-08-02 PROCEDURE — 86140 C-REACTIVE PROTEIN: CPT | Performed by: INTERNAL MEDICINE

## 2023-08-02 PROCEDURE — 25010000002 HYDRALAZINE PER 20 MG

## 2023-08-02 PROCEDURE — 25010000002 CEFTRIAXONE PER 250 MG: Performed by: INTERNAL MEDICINE

## 2023-08-02 PROCEDURE — 80202 ASSAY OF VANCOMYCIN: CPT

## 2023-08-02 PROCEDURE — 25010000002 VANCOMYCIN 10 G RECONSTITUTED SOLUTION

## 2023-08-02 PROCEDURE — 84145 PROCALCITONIN (PCT): CPT | Performed by: INTERNAL MEDICINE

## 2023-08-02 PROCEDURE — 86593 SYPHILIS TEST NON-TREP QUANT: CPT | Performed by: INTERNAL MEDICINE

## 2023-08-02 PROCEDURE — 85025 COMPLETE CBC W/AUTO DIFF WBC: CPT | Performed by: INTERNAL MEDICINE

## 2023-08-02 PROCEDURE — 82550 ASSAY OF CK (CPK): CPT | Performed by: INTERNAL MEDICINE

## 2023-08-02 PROCEDURE — 25010000002 ACYCLOVIR PER 5 MG

## 2023-08-02 PROCEDURE — 83605 ASSAY OF LACTIC ACID: CPT | Performed by: INTERNAL MEDICINE

## 2023-08-02 PROCEDURE — 25010000002 VANCOMYCIN PER 500 MG

## 2023-08-02 PROCEDURE — 81001 URINALYSIS AUTO W/SCOPE: CPT | Performed by: INTERNAL MEDICINE

## 2023-08-02 RX ORDER — HYDRALAZINE HYDROCHLORIDE 20 MG/ML
10 INJECTION INTRAMUSCULAR; INTRAVENOUS ONCE
Status: COMPLETED | OUTPATIENT
Start: 2023-08-02 | End: 2023-08-02

## 2023-08-02 RX ORDER — VANCOMYCIN HYDROCHLORIDE 1 G/200ML
1000 INJECTION, SOLUTION INTRAVENOUS
Status: DISCONTINUED | OUTPATIENT
Start: 2023-08-02 | End: 2023-08-03

## 2023-08-02 RX ORDER — LORAZEPAM 2 MG/ML
0.5 INJECTION INTRAMUSCULAR ONCE AS NEEDED
Status: DISCONTINUED | OUTPATIENT
Start: 2023-08-03 | End: 2023-08-02

## 2023-08-02 RX ADMIN — ACYCLOVIR SODIUM 860 MG: 500 INJECTION, SOLUTION INTRAVENOUS at 03:34

## 2023-08-02 RX ADMIN — INSULIN LISPRO 3 UNITS: 100 INJECTION, SOLUTION INTRAVENOUS; SUBCUTANEOUS at 20:23

## 2023-08-02 RX ADMIN — ACETAMINOPHEN 650 MG: 325 TABLET ORAL at 10:08

## 2023-08-02 RX ADMIN — HYDRALAZINE HYDROCHLORIDE 25 MG: 25 TABLET, FILM COATED ORAL at 20:13

## 2023-08-02 RX ADMIN — INSULIN LISPRO 2 UNITS: 100 INJECTION, SOLUTION INTRAVENOUS; SUBCUTANEOUS at 08:58

## 2023-08-02 RX ADMIN — VANCOMYCIN HYDROCHLORIDE 1250 MG: 10 INJECTION, POWDER, LYOPHILIZED, FOR SOLUTION INTRAVENOUS at 01:25

## 2023-08-02 RX ADMIN — POVIDONE-IODINE 1 EACH: 10 SOLUTION TOPICAL at 09:46

## 2023-08-02 RX ADMIN — BRIMONIDINE TARTRATE 1 DROP: 2 SOLUTION OPHTHALMIC at 20:17

## 2023-08-02 RX ADMIN — FAMOTIDINE 20 MG: 20 TABLET, FILM COATED ORAL at 20:13

## 2023-08-02 RX ADMIN — THIAMINE HCL TAB 100 MG 100 MG: 100 TAB at 15:35

## 2023-08-02 RX ADMIN — LORAZEPAM 0.5 MG: 2 INJECTION INTRAMUSCULAR; INTRAVENOUS at 23:53

## 2023-08-02 RX ADMIN — SENNOSIDES AND DOCUSATE SODIUM 2 TABLET: 50; 8.6 TABLET ORAL at 20:13

## 2023-08-02 RX ADMIN — ACETAMINOPHEN 650 MG: 325 TABLET ORAL at 01:26

## 2023-08-02 RX ADMIN — HYDROXYZINE HYDROCHLORIDE 50 MG: 25 TABLET, FILM COATED ORAL at 20:23

## 2023-08-02 RX ADMIN — DIVALPROEX SODIUM 250 MG: 250 TABLET, DELAYED RELEASE ORAL at 20:17

## 2023-08-02 RX ADMIN — FERROUS SULFATE TAB 325 MG (65 MG ELEMENTAL FE) 325 MG: 325 (65 FE) TAB at 09:47

## 2023-08-02 RX ADMIN — INSULIN LISPRO 6 UNITS: 100 INJECTION, SOLUTION INTRAVENOUS; SUBCUTANEOUS at 16:54

## 2023-08-02 RX ADMIN — Medication 10 ML: at 20:14

## 2023-08-02 RX ADMIN — SODIUM CHLORIDE 2000 MG: 900 INJECTION INTRAVENOUS at 11:56

## 2023-08-02 RX ADMIN — LORAZEPAM 0.5 MG: 0.5 TABLET ORAL at 20:13

## 2023-08-02 RX ADMIN — Medication 10 ML: at 09:00

## 2023-08-02 RX ADMIN — TIMOLOL MALEATE 1 DROP: 5 SOLUTION/ DROPS OPHTHALMIC at 09:46

## 2023-08-02 RX ADMIN — FAMOTIDINE 20 MG: 20 TABLET, FILM COATED ORAL at 09:47

## 2023-08-02 RX ADMIN — HYDRALAZINE HYDROCHLORIDE 10 MG: 20 INJECTION INTRAMUSCULAR; INTRAVENOUS at 03:29

## 2023-08-02 RX ADMIN — BRIMONIDINE TARTRATE 1 DROP: 2 SOLUTION OPHTHALMIC at 15:35

## 2023-08-02 RX ADMIN — Medication 5 MG: at 20:13

## 2023-08-02 RX ADMIN — HYDRALAZINE HYDROCHLORIDE 25 MG: 25 TABLET, FILM COATED ORAL at 13:15

## 2023-08-02 RX ADMIN — LISINOPRIL 40 MG: 40 TABLET ORAL at 09:47

## 2023-08-02 RX ADMIN — DIVALPROEX SODIUM 250 MG: 250 TABLET, DELAYED RELEASE ORAL at 09:47

## 2023-08-02 RX ADMIN — THIAMINE HCL TAB 100 MG 100 MG: 100 TAB at 09:47

## 2023-08-02 RX ADMIN — BRIMONIDINE TARTRATE 1 DROP: 2 SOLUTION OPHTHALMIC at 09:46

## 2023-08-02 RX ADMIN — THIAMINE HCL TAB 100 MG 100 MG: 100 TAB at 20:13

## 2023-08-02 RX ADMIN — SODIUM CHLORIDE 2000 MG: 900 INJECTION INTRAVENOUS at 00:23

## 2023-08-02 RX ADMIN — VANCOMYCIN HYDROCHLORIDE 1000 MG: 1 INJECTION, SOLUTION INTRAVENOUS at 21:58

## 2023-08-02 RX ADMIN — TIMOLOL MALEATE 1 DROP: 5 SOLUTION/ DROPS OPHTHALMIC at 20:17

## 2023-08-02 RX ADMIN — SODIUM CHLORIDE 2000 MG: 900 INJECTION INTRAVENOUS at 23:47

## 2023-08-02 RX ADMIN — SENNOSIDES AND DOCUSATE SODIUM 2 TABLET: 50; 8.6 TABLET ORAL at 09:47

## 2023-08-02 NOTE — PROGRESS NOTES
[facility] Medicine Services  PROGRESS NOTE    Patient Name: Omkar Nava  : 1955  MRN: [id number]    Date of Admission: 2023  Primary Care Physician: Deann Cao MD    Subjective   Subjective     CC:  AMS    HPI:  C/o headache.  Has been calm per nurse.    ROS:  Unable to assess d/t confusion    Objective   Objective     Vital Signs:   Temp:  [96.5 øF (35.8 øC)-99.6 øF (37.6 øC)] 98.2 øF (36.8 øC)  Heart Rate:  [] 71  Resp:  [18] 18  BP: (113-199)/(56-95) 158/70     Physical Exam:  Constitutional: No acute distress, awake, alert  HENT: NCAT, mucous membranes moist  Respiratory: Clear to auscultation bilaterally, respiratory effort normal   Cardiovascular: RRR, no murmurs, rubs, or gallops  Gastrointestinal: Positive bowel sounds, soft, nontender, nondistended  Musculoskeletal: No bilateral ankle edema, amputated toes on left foot  Psychiatric: Appropriate affect, cooperative  Neurologic: Oriented x 1 (to person only), strength symmetric in all extremities, Cranial Nerves grossly intact to confrontation, speech clear  Skin: No rashes      Results Reviewed:  LAB RESULTS:      Lab 23  1023 23  1022 23  1231   WBC 8.46  --  11.55*   HEMOGLOBIN 8.9*  --  9.3*   HEMATOCRIT 27.6*  --  29.6*   PLATELETS 236  --  256   NEUTROS ABS 5.72  --  9.17*   IMMATURE GRANS (ABS) 0.03  --  0.06*   LYMPHS ABS 1.68  --  1.73   MONOS ABS 0.82  --  0.55   EOS ABS 0.18  --  0.01   MCV 87.9  --  88.9   CRP 0.59*  --   --    PROCALCITONIN 0.09  --  0.05   LACTATE  --  0.8 1.1         Lab 23  1023 23  1231   SODIUM 140  140 138   POTASSIUM 4.2  4.2 5.2   CHLORIDE 106  106 103   CO2 24.0  24.0 22.0   ANION GAP 10.0  10.0 13.0   BUN 20  20 22   CREATININE 1.31*  1.31* 1.23   EGFR 60.4  60.4 65.2   GLUCOSE 150*  150* 159*   CALCIUM 8.7  8.7 9.3   MAGNESIUM  --  1.8         Lab 23  1023 23  1231   TOTAL PROTEIN 6.4 7.6   ALBUMIN 3.2* 3.8    GLOBULIN 3.2 3.8   ALT (SGPT) 19 25   AST (SGOT) 17 25   BILIRUBIN <0.2 <0.2   ALK PHOS 65 75   LIPASE  --  19                     Brief Urine Lab Results  (Last result in the past 365 days)        Color   Clarity   Blood   Leuk Est   Nitrite   Protein   CREAT   Urine HCG        08/02/23 1319 Yellow   Clear   Negative   Negative   Negative   30 mg/dL (1+)                   Microbiology Results Abnormal       Procedure Component Value - Date/Time    Blood Culture - Blood, Hand, Left [[id number]]  (Normal) Collected: 07/31/23 1246    Lab Status: Preliminary result Specimen: Blood from Hand, Left Updated: 08/02/23 1315     Blood Culture No growth at 2 days    Blood Culture - Blood, Arm, Right [[id number]]  (Normal) Collected: 07/31/23 1246    Lab Status: Preliminary result Specimen: Blood from Arm, Right Updated: 08/02/23 1315     Blood Culture No growth at 2 days    Respiratory Panel PCR w/COVID-19(SARS-CoV-2) GIANNI/SIENNA/ANNA/PAD/COR/MAD/ASHIA In-House, NP Swab in UTM/VTM, 3-4 HR TAT - Swab, Nasopharynx [[id number]]  (Normal) Collected: 07/31/23 1328    Lab Status: Final result Specimen: Swab from Nasopharynx Updated: 07/31/23 1439     ADENOVIRUS, PCR Not Detected     Coronavirus 229E Not Detected     Coronavirus HKU1 Not Detected     Coronavirus NL63 Not Detected     Coronavirus OC43 Not Detected     COVID19 Not Detected     Human Metapneumovirus Not Detected     Human Rhinovirus/Enterovirus Not Detected     Influenza A PCR Not Detected     Influenza B PCR Not Detected     Parainfluenza Virus 1 Not Detected     Parainfluenza Virus 2 Not Detected     Parainfluenza Virus 3 Not Detected     Parainfluenza Virus 4 Not Detected     RSV, PCR Not Detected     Bordetella pertussis pcr Not Detected     Bordetella parapertussis PCR Not Detected     Chlamydophila pneumoniae PCR Not Detected     Mycoplasma pneumo by PCR Not Detected    Narrative:      In the setting of a positive respiratory panel with a viral infection PLUS a negative  procalcitonin without other underlying concern for bacterial infection, consider observing off antibiotics or discontinuation of antibiotics and continue supportive care. If the respiratory panel is positive for atypical bacterial infection (Bordetella pertussis, Chlamydophila pneumoniae, or Mycoplasma pneumoniae), consider antibiotic de-escalation to target atypical bacterial infection.            No radiology results from the last 24 hrs        Current medications:  Scheduled Meds:brimonidine, 1 drop, Both Eyes, TID   And  timolol, 1 drop, Both Eyes, BID  cefTRIAXone, 2,000 mg, Intravenous, Q12H  divalproex, 250 mg, Oral, BID  famotidine, 20 mg, Oral, BID  ferrous sulfate, 325 mg, Oral, Daily With Breakfast  hydrALAZINE, 25 mg, Oral, Q8H  insulin lispro, 2-7 Units, Subcutaneous, 4x Daily AC & at Bedtime  lisinopril, 40 mg, Oral, Daily  LORazepam, 0.5 mg, Oral, Nightly  melatonin, 5 mg, Oral, Nightly  povidone-iodine, 1 application , Topical, Daily  senna-docusate sodium, 2 tablet, Oral, BID  sodium chloride, 10 mL, Intravenous, Q12H  thiamine, 100 mg, Oral, TID  vancomycin, 1,000 mg, Intravenous, Q18H      Continuous Infusions:Pharmacy to dose vancomycin,       PRN Meds:.  acetaminophen    senna-docusate sodium **AND** polyethylene glycol **AND** bisacodyl **AND** bisacodyl    dextrose    dextrose    glucagon (human recombinant)    hydrALAZINE    hydrOXYzine    LORazepam    ondansetron    Pharmacy to dose vancomycin    [COMPLETED] Insert Peripheral IV **AND** sodium chloride    sodium chloride    sodium chloride    ziprasidone    Assessment & Plan   Assessment & Plan     Active Hospital Problems    Diagnosis  POA    **AMS (altered mental status) [R41.82]  Yes    S/P transmetatarsal amputation of foot, left [Z89.432]  Not Applicable    Neurocognitive disorder [R41.9]  Yes    Type 2 diabetes mellitus [E11.9]  Yes    Essential hypertension [I10]  Yes    Psychotic disorder [F29]  Yes      Resolved Hospital Problems    No resolved problems to display.        Brief Hospital Course to date:  Omkar Nava is a 67 y.o. male with PMHx significant for dementia w/psychosis, DMII, HTN, polysubstance abuse, osteomyelitis L foot s/p left transmetatarsal amputation 12/2022 who currently resides in nursing home who presented with AMS and fever.     AMS   Fever/Sepsis - concern for CNS infection  - empirically cover for meningitis for now given AMS/headache and fevers, will use ampicillin, CTX, Vancomycin, acyclovir  - LP attempted in the ER as well as IR without success -mostly due to agitation  - infectious work-up including UA, resp PCR, CT abd/pelvis, CT head, CXR unremarkable for clear source of fever  - could consider partially treated cystitis as etiology, however UA is bland  -  plan was to try to re-attempt LP today but daughter refused this AM.  Talked to daughter this afternoon and she is now ok with LP, will see if IR can do at this point.  - ID following recs to continue vanc, rocephin likely until 8/7/2023.  Stopped acyclovir.  Patient improved so ID now ok with not doing LP if too difficult to obtain.  Note patient takes ativan qhs so will plan to give 0.5mg of ativan prior to LP tomorrow.     Dementia w/Psychosis:  - PRN geodon and ativan for agtitation  - continue home oral regimen when okay to take PO - appears he is on depakote, PRN atarax and nightly scheduled ativan     DMII  - SSI coverage for now     HTN:  - continue home regimen, PRN IV medications          Expected Discharge Location and Transportation:   Expected Discharge   Expected Discharge Date: 8/4/2023; Expected Discharge Time:      DVT prophylaxis:  No DVT prophylaxis order currently exists.          CODE STATUS:   Code Status and Medical Interventions:   Ordered at: 07/31/23 1801     Level Of Support Discussed With:    Health Care Surrogate     Code Status (Patient has no pulse and is not breathing):    CPR (Attempt to Resuscitate)     Medical  Interventions (Patient has pulse or is breathing):    Full Support     Release to patient:    Routine Release       Yash Danielson MD  08/02/23

## 2023-08-02 NOTE — PROGRESS NOTES
[facility]    INFECTIOUS DISEASE PROGRESS NOTE    Omkar Nava  1955  [id number]    Consult: 8/1/23    Admit date: 7/31/2023    Requesting Provider: No ref. provider found  Evaluating physician: Rayray Gordon MD  Reason for Consultation: Fever, sepsis, confusion  Chief Complaint: Above      Subjective   History of present illness:  Patient is a  67 y.o.  Yr old male With a history of diabetes mellitus type 2, peripheral neuropathy, history of alcohol and cocaine and marijuana use, essential hypertension, GERD, psychiatric issues in the past, left foot osteomyelitis status post transmetatarsal amputation December 2022, status post IV antibiotics and oral which completed around January 2023.  Patient was at the nursing home and became increasingly confused and was brought in by daughter and admitted to [facility] on 7/31/2023.  Patient is a poor historian.  He was documented to have a fever of greater than 102.  Urinalysis had hematuria without significant pyuria.  Chest x-ray said possible left lower lobe atelectasis.  CT scan of the brain without acute changes.  CT scan of the abdomen and pelvis with some bladder wall thickening.  I was consulted on 8/1/2023 for further evaluation and treatment.    8/2/2023 history reviewed.  Mental status is down to his baseline which is oftentimes confused.  Tolerating vancomycin, ceftriaxone, and acyclovir but stopping acyclovir today.    Past Medical History:   Diagnosis Date    Anemia     Dementia     Diabetes mellitus     Dysphagia     GERD (gastroesophageal reflux disease)     History of alcohol abuse     History of cocaine use     History of marijuana use     Hypertension     Osteomyelitis     Poor historian     records obtained from nursing home records & his family    Visual impairment        Past Surgical History:   Procedure Laterality Date    AMPUTATION FOOT Left 10/18/2022    Procedure: PARTIAL FIRST RAY  AMPUTATION LEFT;  Surgeon: Yeison Petty MD;  Location:  [facility];  Service: Orthopedics;  Laterality: Left;    AMPUTATION FOOT Left 12/5/2022    Procedure: Transmetatarsal of Left Foot;  Surgeon: Yeison Petty MD;  Location:  [facility];  Service: Orthopedics;  Laterality: Left;    EYE SURGERY         Pediatric History   Patient Parents    Not on file     Other Topics Concern    Not on file   Social History Narrative    Caffeine 0-1 servings per day    Patient lives at home .   Nursing home resident, no current alcohol or drug use or cigarette use    family history includes Diabetes in his brother, brother, father, maternal grandfather, maternal grandmother, mother, and sister; Hypertension in his brother, brother, father, and mother.    No Known Allergies    Immunization History   Administered Date(s) Administered    COVID-19 (MODERNA) 1st,2nd,3rd Dose Monovalent 06/09/2021, 07/19/2021    COVID-19 (MODERNA) Monovalent Original Booster 04/11/2022       Medication:    Current Facility-Administered Medications:     acetaminophen (TYLENOL) tablet 650 mg, 650 mg, Oral, Q4H PRN, Sarah Gann DO, 650 mg at 08/02/23 1008    acyclovir (ZOVIRAX) 860 mg in sodium chloride 0.9 % 250 mL IVPB, 10 mg/kg, Intravenous, Q8H, Blaise Miranda IV, PharmD, 860 mg at 08/02/23 0334    sennosides-docusate (PERICOLACE) 8.6-50 MG per tablet 2 tablet, 2 tablet, Oral, BID, 2 tablet at 08/02/23 0947 **AND** polyethylene glycol (MIRALAX) packet 17 g, 17 g, Oral, Daily PRN **AND** bisacodyl (DULCOLAX) EC tablet 5 mg, 5 mg, Oral, Daily PRN **AND** bisacodyl (DULCOLAX) suppository 10 mg, 10 mg, Rectal, Daily PRN, Sarah Gann DO    brimonidine (ALPHAGAN) 0.2 % ophthalmic solution 1 drop, 1 drop, Both Eyes, TID, 1 drop at 08/02/23 0946 **AND** timolol (TIMOPTIC) 0.5 % ophthalmic solution 1 drop, 1 drop, Both Eyes, BID, Sarah Gann, DO, 1 drop at 08/02/23 0946    cefTRIAXone (ROCEPHIN) 2000 mg/100 mL 0.9% NS IVPB (MBP), 2,000  mg, Intravenous, Q12H, Sarah Gann DO, 2,000 mg at 08/02/23 0023    dextrose (D50W) (25 g/50 mL) IV injection 25 g, 25 g, Intravenous, Q15 Min PRN, Sarah Gann, DO    dextrose (GLUTOSE) oral gel 15 g, 15 g, Oral, Q15 Min PRN, Sarah Gann R, DO    divalproex (DEPAKOTE) DR tablet 250 mg, 250 mg, Oral, BID, Sarah Gann, DO, 250 mg at 08/02/23 0947    famotidine (PEPCID) tablet 20 mg, 20 mg, Oral, BID, Sarah Gann DO, 20 mg at 08/02/23 0947    ferrous sulfate tablet 325 mg, 325 mg, Oral, Daily With Breakfast, Sarah Gann, DO, 325 mg at 08/02/23 0947    glucagon (GLUCAGEN) injection 1 mg, 1 mg, Intramuscular, Q15 Min PRN, Sarah Gann,     hydrALAZINE (APRESOLINE) injection 10 mg, 10 mg, Intravenous, Q6H PRN, Sarah Gann DO, 10 mg at 08/01/23 2239    hydrALAZINE (APRESOLINE) tablet 25 mg, 25 mg, Oral, Q8H, Sarah Gann DO, 25 mg at 08/01/23 2033    hydrOXYzine (ATARAX) tablet 50 mg, 50 mg, Oral, Q6H PRN, Sarah Gann DO, 50 mg at 08/01/23 2236    Insulin Lispro (humaLOG) injection 2-7 Units, 2-7 Units, Subcutaneous, 4x Daily AC & at Bedtime, Sarah Gann DO, 2 Units at 08/02/23 0858    lisinopril (PRINIVIL,ZESTRIL) tablet 40 mg, 40 mg, Oral, Daily, Sarah Gann, DO, 40 mg at 08/02/23 0947    LORazepam (ATIVAN) injection 0.5 mg, 0.5 mg, Intravenous, Q4H PRN, Sarah Gann DO, 0.5 mg at 08/01/23 0233    LORazepam (ATIVAN) tablet 0.5 mg, 0.5 mg, Oral, Nightly, Sarah Gann DO, 0.5 mg at 08/01/23 2033    melatonin tablet 5 mg, 5 mg, Oral, Nightly, Sarah Gann DO, 5 mg at 08/01/23 2033    ondansetron (ZOFRAN) injection 4 mg, 4 mg, Intravenous, Q6H PRN, Sarah Gann DO    Pharmacy to dose vancomycin, , Does not apply, Continuous PRN, Sarah Gann DO    povidone-iodine 10 % swab 1 each, 1 application , Topical, Daily, Yash Danielson MD, 1 each at 08/02/23 0946    [COMPLETED] Insert Peripheral IV, , , Once **AND** sodium  "chloride 0.9 % flush 10 mL, 10 mL, Intravenous, PRN, Sarah Gann R, DO    sodium chloride 0.9 % flush 10 mL, 10 mL, Intravenous, Q12H, Sarah Gann R, DO, 10 mL at 08/02/23 0900    sodium chloride 0.9 % flush 10 mL, 10 mL, Intravenous, PRN, Sarah Gann R, DO    sodium chloride 0.9 % infusion 40 mL, 40 mL, Intravenous, PRN, Sarah Gann R, DO    thiamine (VITAMIN B-1) tablet 100 mg, 100 mg, Oral, TID, Sarah Gann R, DO, 100 mg at 08/02/23 0947    vancomycin 1250 mg/250 mL 0.9% NS IVPB ([facility]), 15 mg/kg, Intravenous, Q18H, Blaise Miranda IV, PharmD, 1,250 mg at 08/02/23 0125    ziprasidone (GEODON) injection 10 mg, 10 mg, Intramuscular, Q4H PRN, Sarah Gann R, DO    Please refer to the medical record for a full medication list    Review of Systems: Unable to obtain secondary to mental status.      Physical Exam:   Vital Signs   Temp:  [96.5 øF (35.8 øC)-99.6 øF (37.6 øC)] 99.6 øF (37.6 øC)  Heart Rate:  [] 89  Resp:  [18] 18  BP: (113-199)/(56-95) 113/63    Temp  Min: 96.5 øF (35.8 øC)  Max: 99.6 øF (37.6 øC)  BP  Min: 113/63  Max: 199/95  Pulse  Min: 61  Max: 107  Resp  Min: 18  Max: 18  SpO2  Min: 91 %  Max: 99 %    Blood pressure 113/63, pulse 89, temperature 99.6 øF (37.6 øC), temperature source Axillary, resp. rate 18, height 182.9 cm (72\"), weight 85.7 kg (189 lb), SpO2 91 %.  GENERAL: Awake and less confused, in Moderate distress. Appears older than stated age.  Resting in bed.  HEENT:  Normocephalic, atraumatic.  Oropharynx without thrush. Dentition in good repair. No cervical adenopathy. No neck masses.  Ears externally normal, Nose externally normal.  EYES: No conjunctival injection. No icterus. EOM full.  LYMPHATICS: No lymphadenopathy of the neck or axillary or inguinal regions.   HEART: No murmur, gallop, or pericardial friction rub. Reg rate rhythm, No JVD at 45 degrees.  LUNGS: Few crackles left base. No respiratory distress, no use of accessory muscles.  ABDOMEN: Soft, " nontender, nondistended. No appreciable HSM.  Bowel sounds normal.  SKIN: Warm and dry without cutaneous eruptions.  No nodules.  Left foot well-healed transmetatarsal amputation site.  PSYCHIATRIC: Mental status lucid. No confusion.  EXT:  No cellulitic change.  NEURO: Oriented to name, nonfocal    Results Review:   I reviewed the patient's new clinical results.  I reviewed the patient's new imaging results and agree with the interpretation.  I reviewed the patient's other test results and agree with the interpretation    Results from last 7 days   Lab Units 08/02/23  1023 07/31/23  1231   WBC 10*3/mm3 8.46 11.55*   HEMOGLOBIN g/dL 8.9* 9.3*   HEMATOCRIT % 27.6* 29.6*   PLATELETS 10*3/mm3 236 256       Results from last 7 days   Lab Units 07/31/23  1231   SODIUM mmol/L 138   POTASSIUM mmol/L 5.2   CHLORIDE mmol/L 103   CO2 mmol/L 22.0   BUN mg/dL 22   CREATININE mg/dL 1.23   GLUCOSE mg/dL 159*   CALCIUM mg/dL 9.3       Results from last 7 days   Lab Units 07/31/23  1231   ALK PHOS U/L 75   BILIRUBIN mg/dL <0.2   ALT (SGPT) U/L 25   AST (SGOT) U/L 25                   Results from last 7 days   Lab Units 07/31/23  1231   LACTATE mmol/L 1.1       Estimated Creatinine Clearance: 72.6 mL/min (by C-G formula based on SCr of 1.23 mg/dL).  CPK          12/7/2022    08:11   Common Labsle   Creatine Kinase 231       Procalitonin Results:      Lab 07/31/23  1231   PROCALCITONIN 0.05        Brief Urine Lab Results  (Last result in the past 365 days)        Color   Clarity   Blood   Leuk Est   Nitrite   Protein   CREAT   Urine HCG        07/31/23 1230 Yellow   Clear   Small (1+)   Negative   Negative   >=300 mg/dL (3+)                  No results found for: SITE, ALLENTEST, PHART, HXZ8KZS, PO2ART, TMC5MCQ, BASEEXCESS, B8BQHXON, HGBBG, HCTABG, OXYHEMOGLOBI, METHHGBN, CARBOXYHGB, CO2CT, BAROMETRIC, MODALITY, FIO2     Microbiology:  Blood Culture   Date Value Ref Range Status   07/31/2023 No growth at 24 hours  Preliminary    07/31/2023 No growth at 24 hours  Preliminary     No results found for: BCIDPCR, CXREFLEX, CSFCX, CULTURETIS  No results found for: CULTURES, HSVCX, URCX  No results found for: EYECULTURE, GCCX, HSVCULTURE, LABHSV  No results found for: LEGIONELLA, MRSACX, MUMPSCX, MYCOPLASCX  No results found for: NOCARDIACX, STOOLCX  No results found for: THROATCX, UNSTIMCULT, URINECX, CULTURE, VZVCULTUR  No results found for: VIRALCULTU, WOUNDCX     Blood Culture   Date Value Ref Range Status   07/31/2023 No growth at 24 hours  Preliminary   07/31/2023 No growth at 24 hours  Preliminary   (      Radiology:  Imaging Results (Last 72 Hours)       Procedure Component Value Units Date/Time    IR LUMBAR PUNCTURE DIAGNOSTIC [[id number]] Resulted: 07/31/23 1806     Updated: 07/31/23 1809    CT Abdomen Pelvis With Contrast [[id number]] Collected: 07/31/23 1452     Updated: 07/31/23 1502    Narrative:      CT ABDOMEN PELVIS W CONTRAST    Date of Exam: 7/31/2023 2:33 PM EDT    Indication: ams.    Comparison: CT of the abdomen and pelvis dated 5/6/2017    Technique: Axial CT images were obtained of the abdomen and pelvis following the uneventful intravenous administration of 85 mL Isovue-300. Reconstructed coronal and sagittal images were also obtained. Automated exposure control and iterative   construction methods were used.    Findings:    Examination is limited due to motion artifact.    Liver: The liver is unremarkable in morphology. No focal liver lesion is seen. No biliary dilation is seen.    Gallbladder: Gallbladder is not well-visualized due to motion artifact.    Pancreas: Unremarkable.    Spleen: Unremarkable.    Adrenal glands: Unremarkable.    Genitourinary tract: Kidneys and ureters appear unremarkable. Bladder wall thickening may be related to underdistention or cystitis. Prostate gland is unremarkable.    Gastrointestinal tract: Moderate colonic stool is present. Scattered colonic diverticula are seen. No findings to  suggest bowel obstruction.    Appendix: No findings to suggest acute appendicitis.    Other findings: No free air or free fluid is identified. No pathologically enlarged lymph nodes are seen. Vascular calcifications are seen. IVC is unremarkable.    Bones and soft tissues: No acute osseous lesion is identified. There are mild degenerative changes of the spine. There is 10 mm anterolisthesis at L4-L5. Superficial soft tissues demonstrate no acute abnormality.    Lung bases: Scattered bibasilar atelectasis.      Impression:      Impression:  1.Examination is limited due to motion artifact.  2.Given this limitation, no acute abnormality identified within the abdomen or pelvis.  3.Moderate colonic stool.  4.Bladder wall thickening may be related to underdistention or cystitis. Please correlate with urinalysis.  5.Additional findings as detailed above.    Electronically Signed: Ryan Perales    7/31/2023 2:59 PM EDT    Workstation ID: [id number]    CT Head Without Contrast [[id number]] Collected: 07/31/23 1452     Updated: 07/31/23 1457    Narrative:      CT HEAD WO CONTRAST    Date of Exam: 7/31/2023 2:33 PM EDT    Indication: AMS.    Comparison: 4/29/2022 MR brain    Technique: Axial CT images were obtained of the head without contrast administration.  Automated exposure control and iterative construction methods were used.      Findings:  Parenchyma:No acute intraparenchymal hemorrhage. No loss of gray-white differentiation to suggest large territory infarct. Mild parenchymal volume loss. Scattered periventricular and subcortical white matter hypodensities, nonspecific, but most often   consistent with small vessel ischemic changes. No midline shift or herniation.    Ventricles and extra axial spaces:Prominent ventricles and sulci secondary to volume loss. No extra axial fluid collection seen.    Other: Right lens replacement. Paranasal sinuses are clear. Mastoid air cells are clear. Calvarium is intact. Intracranial  atherosclerotic calcification is present.      Impression:      Impression:  No evidence of acute hemorrhage or large territory infarct    Chronic changes as above.        Electronically Signed: Anil Krishnamurthy    7/31/2023 2:54 PM EDT    Workstation ID: [id number]    XR Chest 1 View [[id number]] Collected: 07/31/23 1332     Updated: 07/31/23 1338    Narrative:      XR CHEST 1 VW    Date of Exam: 7/31/2023 1:02 PM EDT    Indication: AMS    Comparison: 12/10/2022    Findings:  Heart shadow is mildly enlarged. Vasculature is cephalized. Minimal if any perihilar edema present; overall pattern is similar to the prior study. There may be a small area of focal atelectasis in the medial left lung base. No other focal lung disease,   effusion or pneumothorax is seen.      Impression:      Impression:  Moderate pulmonary venous hypertension. Small focus of left lower lobe atelectasis.      Electronically Signed: Wisam Oquendo    7/31/2023 1:35 PM EDT    Workstation ID: [id number]            IMPRESSION:     1.  Sepsis, present on admission, sources could include a cystitis with hematuria but no significant pyuria, less likely aspiration pneumonia left lower lobe versus atelectasis, possible encephalitis not seen on CT scan, and unable to get lumbar puncture secondary to lack of cooperation, versus other.  Usual organisms would include Staphylococcus, gram-negative rods, HSV.  Clinically resolved.  2.  Encephalopathy, toxic and metabolic.  Also positive drug screen for benzodiazepines.  He also has some baseline psychiatric issues.  2a.  Doubt encephalitis.  3.  Leukocytosis, neutrophilic related to above issues.  Resolved.  4.  Anemia, chronic disease.  Worse.  5.  Hematuria, related to cystitis versus other.  6.  Diabetes mellitus type 2 with increased risk for infection.    Plan:    1.  Diagnostically, continue to follow patient's physical exam, CBC, CMP, CRP, can hold on lumbar puncture if difficult to obtain, but follow cultures  that have been obtained.  2.  Therapeutically, continue vancomycin, ceftriaxone, discontinue acyclovir.  Duration of vancomycin and ceftriaxone to be determined but likely to be 7 days until 8/7/2023  3.  Supportive care.    I discussed the patient's findings and my recommendations with patient and nursing staff    Thank you for asking me to see Omkar Nava.  Our group would be pleased to follow this patient over the course of their hospitalization and assist with outpatient antimicrobial therapy, as indicated.  Further recommendations depend on the results of the cultures and clinical course.  Increased risk for adverse drug reactions, complications of IV access, readmission.  I discussed with the patient's family.    Rayray Gordon MD  8/2/2023

## 2023-08-02 NOTE — PLAN OF CARE
Goal Outcome Evaluation:  Plan of Care Reviewed With: patient           Outcome Evaluation: PT A/Ox1-2. Anxious but cooperative. Resps even and non-labored. SBP>180. Hydralazine given as ordered. Current BP at this time 122/56.  Pt also c/o HA. Tylenol given with relief.  SR/ST on tele. Denies CP or SOA. Cont on ABX therapy without adverse reaction noted. Monitored closely. No concerns at this time. Will cont POC

## 2023-08-02 NOTE — PROGRESS NOTES
"Pharmacy Consult - Vancomycin Dosing    Omkar Nava is a 67 y.o. male receiving vancomycin therapy.     Indication: Possible CNS infection  Consulting Provider: Hospitalist  ID Consult: Yes    Goal AUC: 400 - 600 mg/L*hr    Current Antimicrobial Therapy  Anti-Infectives (From admission, onward)      Ordered     Dose/Rate Route Frequency Start Stop    07/31/23 1834  vancomycin 1250 mg/250 mL 0.9% NS IVPB ([facility])        Ordering Provider: Blaise Miranda IV, PharmD    15 mg/kg x 85.7 kg  over 75 Minutes Intravenous Every 18 Hours 08/01/23 0600 08/07/23 0559    07/31/23 1807  cefTRIAXone (ROCEPHIN) 2000 mg/100 mL 0.9% NS IVPB ([facility])        Ordering Provider: Sarah Gann DO    2,000 mg  over 30 Minutes Intravenous Every 12 Hours 08/01/23 0000 08/07/23 2359    07/31/23 1807  Pharmacy to dose vancomycin        Ordering Provider: Sarah Gann DO     Does not apply Continuous PRN 07/31/23 1803 08/06/23 1802    07/31/23 1257  vancomycin 1750 mg/500 mL 0.9% NS IVPB ([facility])        Ordering Provider: Omkar Seth MD    20 mg/kg x 85.7 kg  over 105 Minutes Intravenous Once 07/31/23 1330 07/31/23 1656    07/31/23 1120  piperacillin-tazobactam (ZOSYN) 3.375 g in iso-osmotic dextrose 50 ml (premix)        Ordering Provider: Omkar Seth MD    3.375 g Intravenous Once 07/31/23 1136 07/31/23 1325          Allergies  Allergies as of 07/31/2023    (No Known Allergies)     Labs  Results from last 7 days   Lab Units 08/02/23  1023 07/31/23  1231   BUN mg/dL 20  20 22   CREATININE mg/dL 1.31*  1.31* 1.23     Results from last 7 days   Lab Units 08/02/23  1023 07/31/23  1231   WBC 10*3/mm3 8.46 11.55*     Evaluation of Dosing     Last Dose Received in the ED/Outside Facility: Vancomycin 1750mg IV x1 in ED on 7/31 @ 1511  Is Patient on Dialysis or Renal Replacement: no    Ht - 182.9 cm (72\")  Wt - 85.7 kg (189 lb)    Estimated Creatinine Clearance: 68.1 mL/min (A) (by C-G formula based on SCr of 1.31 mg/dL " (H)).    Intake & Output (last 3 days)         07/30 0701  07/31 0700 07/31 0701  08/01 0700 08/01 0701  08/02 0700 08/02 0701  08/03 0700    P.O.   640 0    IV Piggyback  50  100    Total Intake(mL/kg)  50 (0.6) 640 (7.5) 100 (1.2)    Urine (mL/kg/hr)   3525 (1.7) 400 (0.7)    Stool   0     Total Output   3525 400    Net  +50 -2885 -300            Stool Unmeasured Occurrence   1 x           Microbiology and Radiology  Microbiology Results (last 10 days)       Procedure Component Value - Date/Time    Respiratory Panel PCR w/COVID-19(SARS-CoV-2) [facility], NP Swab in UTM/VTM, 3-4 HR TAT - Swab, Nasopharynx [[id number]]  (Normal) Collected: 07/31/23 1328    Lab Status: Final result Specimen: Swab from Nasopharynx Updated: 07/31/23 1439     ADENOVIRUS, PCR Not Detected     Coronavirus 229E Not Detected     Coronavirus HKU1 Not Detected     Coronavirus NL63 Not Detected     Coronavirus OC43 Not Detected     COVID19 Not Detected     Human Metapneumovirus Not Detected     Human Rhinovirus/Enterovirus Not Detected     Influenza A PCR Not Detected     Influenza B PCR Not Detected     Parainfluenza Virus 1 Not Detected     Parainfluenza Virus 2 Not Detected     Parainfluenza Virus 3 Not Detected     Parainfluenza Virus 4 Not Detected     RSV, PCR Not Detected     Bordetella pertussis pcr Not Detected     Bordetella parapertussis PCR Not Detected     Chlamydophila pneumoniae PCR Not Detected     Mycoplasma pneumo by PCR Not Detected    Narrative:      In the setting of a positive respiratory panel with a viral infection PLUS a negative procalcitonin without other underlying concern for bacterial infection, consider observing off antibiotics or discontinuation of antibiotics and continue supportive care. If the respiratory panel is positive for atypical bacterial infection (Bordetella pertussis, Chlamydophila pneumoniae, or Mycoplasma pneumoniae), consider antibiotic de-escalation to target atypical  bacterial infection.    Blood Culture - Blood, Hand, Left [[id number]]  (Normal) Collected: 07/31/23 1246    Lab Status: Preliminary result Specimen: Blood from Hand, Left Updated: 08/02/23 1315     Blood Culture No growth at 2 days    Blood Culture - Blood, Arm, Right [[id number]]  (Normal) Collected: 07/31/23 1246    Lab Status: Preliminary result Specimen: Blood from Arm, Right Updated: 08/02/23 1315     Blood Culture No growth at 2 days          Reported Vancomycin Levels  Results from last 7 days   Lab Units 08/02/23  1023   VANCOMYCIN RM mcg/mL 21.50              [employer] AUC Calculation:    Current AUC: 487 mg/L*hr    Predicted Steady State AUC on Current Dose: 611 mg/L*hr  _________________________________    Predicted Steady State AUC on New Dose: 499 mg/L*hr    Assessment/Plan:   1. Pharmacy dosing vancomycin for possible CNS infection, goal -600.  2. Patient currently on a maintenance dose of vancomycin 1250mg (~15 mg/kg) IV q18h. Vancomycin level this morning prior to the 4th total dose returned at 21.5 mcg/mL (~9 hour level) which is associated with a supratherapeutic predicted steady state AUC. SCr is also slightly elevated. Will decrease vancomycin dose to 1000mg (~12 mg/kg) IV q18h starting tonight at 2100. Plan to repeat level in ~2 days.  3. Pharmacy will continue to monitor and adjust vancomycin dose as necessary based on renal function, cultures, labs, and clinical status.    Thank you,  Juan Carlos Fontana, PharmD, BCPS  8/2/2023  14:14 EDT

## 2023-08-03 LAB
ALBUMIN SERPL-MCNC: 3.1 G/DL (ref 3.5–5.2)
ALBUMIN/GLOB SERPL: 1.2 G/DL
ALP SERPL-CCNC: 67 U/L (ref 39–117)
ALT SERPL W P-5'-P-CCNC: 15 U/L (ref 1–41)
ANION GAP SERPL CALCULATED.3IONS-SCNC: 10 MMOL/L (ref 5–15)
AST SERPL-CCNC: 13 U/L (ref 1–40)
BASOPHILS # BLD AUTO: 0.02 10*3/MM3 (ref 0–0.2)
BASOPHILS NFR BLD AUTO: 0.3 % (ref 0–1.5)
BILIRUB SERPL-MCNC: <0.2 MG/DL (ref 0–1.2)
BUN SERPL-MCNC: 18 MG/DL (ref 8–23)
BUN/CREAT SERPL: 14.5 (ref 7–25)
CALCIUM SPEC-SCNC: 8.1 MG/DL (ref 8.6–10.5)
CHLORIDE SERPL-SCNC: 105 MMOL/L (ref 98–107)
CO2 SERPL-SCNC: 25 MMOL/L (ref 22–29)
CREAT SERPL-MCNC: 1.24 MG/DL (ref 0.76–1.27)
D-LACTATE SERPL-SCNC: 0.9 MMOL/L (ref 0.5–2)
DEPRECATED RDW RBC AUTO: 43.4 FL (ref 37–54)
EGFRCR SERPLBLD CKD-EPI 2021: 64.5 ML/MIN/1.73
EOSINOPHIL # BLD AUTO: 0.28 10*3/MM3 (ref 0–0.4)
EOSINOPHIL NFR BLD AUTO: 4.4 % (ref 0.3–6.2)
ERYTHROCYTE [DISTWIDTH] IN BLOOD BY AUTOMATED COUNT: 13.4 % (ref 12.3–15.4)
GLOBULIN UR ELPH-MCNC: 2.5 GM/DL
GLUCOSE BLDC GLUCOMTR-MCNC: 170 MG/DL (ref 70–130)
GLUCOSE BLDC GLUCOMTR-MCNC: 197 MG/DL (ref 70–130)
GLUCOSE BLDC GLUCOMTR-MCNC: 201 MG/DL (ref 70–130)
GLUCOSE BLDC GLUCOMTR-MCNC: 245 MG/DL (ref 70–130)
GLUCOSE SERPL-MCNC: 177 MG/DL (ref 65–99)
HCT VFR BLD AUTO: 25.1 % (ref 37.5–51)
HGB BLD-MCNC: 8 G/DL (ref 13–17.7)
IMM GRANULOCYTES # BLD AUTO: 0.02 10*3/MM3 (ref 0–0.05)
IMM GRANULOCYTES NFR BLD AUTO: 0.3 % (ref 0–0.5)
LYMPHOCYTES # BLD AUTO: 1.91 10*3/MM3 (ref 0.7–3.1)
LYMPHOCYTES NFR BLD AUTO: 29.8 % (ref 19.6–45.3)
MCH RBC QN AUTO: 28.2 PG (ref 26.6–33)
MCHC RBC AUTO-ENTMCNC: 31.9 G/DL (ref 31.5–35.7)
MCV RBC AUTO: 88.4 FL (ref 79–97)
MONOCYTES # BLD AUTO: 0.66 10*3/MM3 (ref 0.1–0.9)
MONOCYTES NFR BLD AUTO: 10.3 % (ref 5–12)
NEUTROPHILS NFR BLD AUTO: 3.52 10*3/MM3 (ref 1.7–7)
NEUTROPHILS NFR BLD AUTO: 54.9 % (ref 42.7–76)
NRBC BLD AUTO-RTO: 0 /100 WBC (ref 0–0.2)
PLATELET # BLD AUTO: 205 10*3/MM3 (ref 140–450)
PMV BLD AUTO: 11.2 FL (ref 6–12)
POTASSIUM SERPL-SCNC: 4 MMOL/L (ref 3.5–5.2)
PROT SERPL-MCNC: 5.6 G/DL (ref 6–8.5)
RBC # BLD AUTO: 2.84 10*6/MM3 (ref 4.14–5.8)
RPR SER-TITR: NON REACTIVE {TITER}
SODIUM SERPL-SCNC: 140 MMOL/L (ref 136–145)
WBC NRBC COR # BLD: 6.41 10*3/MM3 (ref 3.4–10.8)

## 2023-08-03 PROCEDURE — 80053 COMPREHEN METABOLIC PANEL: CPT | Performed by: INTERNAL MEDICINE

## 2023-08-03 PROCEDURE — 85025 COMPLETE CBC W/AUTO DIFF WBC: CPT | Performed by: INTERNAL MEDICINE

## 2023-08-03 PROCEDURE — 25010000002 CEFTRIAXONE PER 250 MG: Performed by: INTERNAL MEDICINE

## 2023-08-03 PROCEDURE — 25010000002 LORAZEPAM PER 2 MG: Performed by: INTERNAL MEDICINE

## 2023-08-03 PROCEDURE — 99232 SBSQ HOSP IP/OBS MODERATE 35: CPT | Performed by: INTERNAL MEDICINE

## 2023-08-03 PROCEDURE — 25010000002 HYDRALAZINE PER 20 MG: Performed by: INTERNAL MEDICINE

## 2023-08-03 PROCEDURE — 83605 ASSAY OF LACTIC ACID: CPT | Performed by: INTERNAL MEDICINE

## 2023-08-03 PROCEDURE — 63710000001 INSULIN LISPRO (HUMAN) PER 5 UNITS: Performed by: INTERNAL MEDICINE

## 2023-08-03 PROCEDURE — 82948 REAGENT STRIP/BLOOD GLUCOSE: CPT

## 2023-08-03 RX ORDER — ECHINACEA PURPUREA EXTRACT 125 MG
2 TABLET ORAL AS NEEDED
Status: DISCONTINUED | OUTPATIENT
Start: 2023-08-03 | End: 2023-08-09 | Stop reason: HOSPADM

## 2023-08-03 RX ORDER — DOXYCYCLINE 100 MG/1
100 CAPSULE ORAL EVERY 12 HOURS SCHEDULED
Status: COMPLETED | OUTPATIENT
Start: 2023-08-03 | End: 2023-08-08

## 2023-08-03 RX ORDER — CEFUROXIME AXETIL 250 MG/1
500 TABLET ORAL EVERY 12 HOURS SCHEDULED
Status: COMPLETED | OUTPATIENT
Start: 2023-08-03 | End: 2023-08-08

## 2023-08-03 RX ADMIN — HYDRALAZINE HYDROCHLORIDE 25 MG: 25 TABLET, FILM COATED ORAL at 14:09

## 2023-08-03 RX ADMIN — Medication 5 MG: at 21:30

## 2023-08-03 RX ADMIN — DIVALPROEX SODIUM 250 MG: 250 TABLET, DELAYED RELEASE ORAL at 22:41

## 2023-08-03 RX ADMIN — APIXABAN 2.5 MG: 2.5 TABLET, FILM COATED ORAL at 11:43

## 2023-08-03 RX ADMIN — THIAMINE HCL TAB 100 MG 100 MG: 100 TAB at 16:45

## 2023-08-03 RX ADMIN — CEFUROXIME AXETIL 500 MG: 250 TABLET, FILM COATED ORAL at 21:29

## 2023-08-03 RX ADMIN — HYDRALAZINE HYDROCHLORIDE 25 MG: 25 TABLET, FILM COATED ORAL at 05:51

## 2023-08-03 RX ADMIN — FAMOTIDINE 20 MG: 20 TABLET, FILM COATED ORAL at 21:29

## 2023-08-03 RX ADMIN — LORAZEPAM 0.5 MG: 2 INJECTION INTRAMUSCULAR; INTRAVENOUS at 16:45

## 2023-08-03 RX ADMIN — SENNOSIDES AND DOCUSATE SODIUM 2 TABLET: 50; 8.6 TABLET ORAL at 09:27

## 2023-08-03 RX ADMIN — THIAMINE HCL TAB 100 MG 100 MG: 100 TAB at 21:30

## 2023-08-03 RX ADMIN — BRIMONIDINE TARTRATE 1 DROP: 2 SOLUTION OPHTHALMIC at 16:49

## 2023-08-03 RX ADMIN — LISINOPRIL 40 MG: 40 TABLET ORAL at 09:27

## 2023-08-03 RX ADMIN — LORAZEPAM 0.5 MG: 0.5 TABLET ORAL at 21:30

## 2023-08-03 RX ADMIN — INSULIN LISPRO 2 UNITS: 100 INJECTION, SOLUTION INTRAVENOUS; SUBCUTANEOUS at 11:43

## 2023-08-03 RX ADMIN — BRIMONIDINE TARTRATE 1 DROP: 2 SOLUTION OPHTHALMIC at 22:40

## 2023-08-03 RX ADMIN — INSULIN LISPRO 3 UNITS: 100 INJECTION, SOLUTION INTRAVENOUS; SUBCUTANEOUS at 09:27

## 2023-08-03 RX ADMIN — Medication 10 ML: at 09:29

## 2023-08-03 RX ADMIN — BRIMONIDINE TARTRATE 1 DROP: 2 SOLUTION OPHTHALMIC at 09:28

## 2023-08-03 RX ADMIN — TIMOLOL MALEATE 1 DROP: 5 SOLUTION/ DROPS OPHTHALMIC at 09:28

## 2023-08-03 RX ADMIN — POVIDONE-IODINE 1 EACH: 10 SOLUTION TOPICAL at 09:28

## 2023-08-03 RX ADMIN — Medication 10 ML: at 21:35

## 2023-08-03 RX ADMIN — APIXABAN 2.5 MG: 2.5 TABLET, FILM COATED ORAL at 21:29

## 2023-08-03 RX ADMIN — HYDRALAZINE HYDROCHLORIDE 25 MG: 25 TABLET, FILM COATED ORAL at 21:30

## 2023-08-03 RX ADMIN — INSULIN LISPRO 3 UNITS: 100 INJECTION, SOLUTION INTRAVENOUS; SUBCUTANEOUS at 21:29

## 2023-08-03 RX ADMIN — SODIUM CHLORIDE 2000 MG: 900 INJECTION INTRAVENOUS at 11:43

## 2023-08-03 RX ADMIN — INSULIN LISPRO 2 UNITS: 100 INJECTION, SOLUTION INTRAVENOUS; SUBCUTANEOUS at 16:45

## 2023-08-03 RX ADMIN — TIMOLOL MALEATE 1 DROP: 5 SOLUTION/ DROPS OPHTHALMIC at 22:41

## 2023-08-03 RX ADMIN — DIVALPROEX SODIUM 250 MG: 250 TABLET, DELAYED RELEASE ORAL at 09:27

## 2023-08-03 RX ADMIN — THIAMINE HCL TAB 100 MG 100 MG: 100 TAB at 09:27

## 2023-08-03 RX ADMIN — DOXYCYCLINE 100 MG: 100 CAPSULE ORAL at 21:29

## 2023-08-03 RX ADMIN — FERROUS SULFATE TAB 325 MG (65 MG ELEMENTAL FE) 325 MG: 325 (65 FE) TAB at 09:27

## 2023-08-03 RX ADMIN — HYDRALAZINE HYDROCHLORIDE 10 MG: 20 INJECTION INTRAMUSCULAR; INTRAVENOUS at 16:54

## 2023-08-03 RX ADMIN — FAMOTIDINE 20 MG: 20 TABLET, FILM COATED ORAL at 09:27

## 2023-08-03 NOTE — DISCHARGE PLACEMENT REQUEST
"Prema Sung (67 y.o. Male)       Date of Birth   1955    Social Security Number       Address   [address] [place] [id number]    Home Phone   [phone]    MRN   [id number]       Bahai   None    Marital Status   Single                            Admission Date   7/31/23    Admission Type   Emergency    Admitting Provider   Yash Danielson MD    Attending Provider   Yash Danielson MD    Department, Room/Bed   [facility] 6B, N639/1       Discharge Date       Discharge Disposition       Discharge Destination                                 Attending Provider: Yash Danielson MD    Allergies: No Known Allergies    Isolation: None   Infection: MRSA (07/31/23)   Code Status: CPR    Ht: 182.9 cm (72\")   Wt: 85.7 kg (189 lb)    Admission Cmt: None   Principal Problem: AMS (altered mental status) [R41.82]                   Active Insurance as of 7/31/2023       Primary Coverage       Payor Plan Insurance Group Employer/Plan Group    MEDICARE MEDICARE A & B        Payor Plan Address Payor Plan Phone Number Payor Plan Fax Number Effective Dates    [address] [phone]  8/1/2022 - None Entered    [facility] [id number]         Subscriber Name Subscriber Birth Date Member ID       PREMA SUNG 1955 [id number]               Secondary Coverage       Payor Plan Insurance Group Employer/Plan Group    [place]        Payor Plan Address Payor Plan Phone Number Payor Plan Fax Number Effective Dates    [address] [phone]  3/6/2023 - None Entered    [facility] [place] [address]         Subscriber Name Subscriber Birth Date Member ID       ANA LILIAPREMA RALF 1955 [id number]                     Emergency Contacts        (Rel.) Home Phone Work Phone Mobile Phone    Idalia Lerma (Daughter) -- -- [phone]    CliffordThang (Sister) [phone] -- --                 History & Physical        Sarah Gann DO at 07/31/23 1844        "       [facility] Medicine Services  HISTORY AND PHYSICAL    Patient Name: Omkar Nava  : 1955  MRN: [id number]  Primary Care Physician: Deann Cao MD  Date of admission: 2023      Subjective  Subjective     Chief Complaint:  AMS    HPI:  Omkar Nava is a 67 y.o. male with PMHx significant for dementia w/psychosis, DMII, HTN, polysubstance abuse, osteomyelitis L foot s/p left transmetatarsal amputation 2022 who currently resides in nursing home. Daughter brought patient into the ED due to change in mental status. She reports patient has been doing really well lately, has been pleasant, conversant. At baseline he is able to recognize her, he knows the day/month, place ect. This week he developed UTI type symptoms and was put on a short course of ciprofloxacin about 3 days ago, however daughter states she was concerned as his mental status and fever continued to worsen. She states he complained of his head hurting, denied neck pain. On arrival to the ED he was febrile to 103. His work-up including CT scan were unremarkable with no clear source. LP was attempted in both the ED and IR, however was unsuccessful due to agitation, despite sedating medications.     Review of Systems   UTO, secondary to mental status    Personal History     Past Medical History:   Diagnosis Date    Anemia     Dementia     Diabetes mellitus     Dysphagia     GERD (gastroesophageal reflux disease)     History of alcohol abuse     History of cocaine use     History of marijuana use     Hypertension     Osteomyelitis     Poor historian     records obtained from nursing home records & his family    Visual impairment              Past Surgical History:   Procedure Laterality Date    AMPUTATION FOOT Left 10/18/2022    Procedure: PARTIAL FIRST RAY AMPUTATION LEFT;  Surgeon: Yeison Petty MD;  Location: [facility];  Service: Orthopedics;  Laterality: Left;    AMPUTATION FOOT Left 2022     "Procedure: Transmetatarsal of Left Foot;  Surgeon: Yeison Petty MD;  Location: [facility];  Service: Orthopedics;  Laterality: Left;    EYE SURGERY         Family History: family history includes Diabetes in his brother, brother, father, maternal grandfather, maternal grandmother, mother, and sister; Hypertension in his brother, brother, father, and mother.     Social History:  reports that he quit smoking about 6 years ago. His smoking use included cigarettes. He has a 40.00 pack-year smoking history. He has never used smokeless tobacco. He reports that he does not currently use alcohol. He reports current drug use. Drugs: Marijuana and \"Crack\" cocaine.  Social History     Social History Narrative    Caffeine 0-1 servings per day    Patient lives at home .       Medications:  Available home medication information reviewed.  Medications Prior to Admission   Medication Sig Dispense Refill Last Dose    acetaminophen (TYLENOL) 325 MG tablet Take 2 tablets by mouth Every 6 (Six) Hours As Needed for Mild Pain, Headache or Fever.   7/30/2023    albuterol sulfate  (90 Base) MCG/ACT inhaler Inhale 2 puffs Every 4 (Four) Hours As Needed for Wheezing or Shortness of Air.   Past Month    apixaban (ELIQUIS) 2.5 MG tablet tablet Take 1 tablet by mouth 2 (Two) Times a Day. VTE ppx   7/31/2023    atorvastatin (LIPITOR) 20 MG tablet Take 1 tablet by mouth Every Night.   7/30/2023    brimonidine-timolol (COMBIGAN) 0.2-0.5 % ophthalmic solution Administer 1 drop to both eyes 2 (Two) Times a Day.   7/30/2023    Cholecalciferol 50 MCG (2000 UT) capsule Take 3 capsules by mouth Every Morning.   7/30/2023    difluprednate (DUREZOL) 0.05 % ophthalmic emulsion Administer 1 drop into the left eye 4 (Four) Times a Day.   7/30/2023    divalproex (DEPAKOTE) 125 MG DR tablet Take 2 tablets by mouth 2 (Two) Times a Day.   7/30/2023    Emollient (Aquaphilic) ointment Apply 1 application  topically to the appropriate area as directed 2 " (Two) Times a Day. Apply to scalp, lower back, arms, legs, for dry skin   7/30/2023    famotidine (PEPCID) 20 MG tablet Take 1 tablet by mouth 2 (Two) Times a Day.   7/30/2023    ferrous sulfate 325 (65 FE) MG tablet Take 1 tablet by mouth Daily With Breakfast.   7/30/2023    furosemide (LASIX) 20 MG tablet Take 2 tablets by mouth Daily.   7/31/2023    hydrALAZINE (APRESOLINE) 25 MG tablet Take 1 tablet by mouth Every 8 (Eight) Hours. 90 tablet 0 7/30/2023    hydrOXYzine (ATARAX) 50 MG tablet Take 1 tablet by mouth Every 6 (Six) Hours As Needed for Anxiety.   Past Week    insulin glargine (LANTUS, SEMGLEE) 100 UNIT/ML injection Inject 6 Units under the skin into the appropriate area as directed Every Night.   7/30/2023    lisinopril (PRINIVIL,ZESTRIL) 40 MG tablet Take 1 tablet by mouth Daily.   7/31/2023    LORazepam (ATIVAN) 0.5 MG tablet Take 1 tablet by mouth every night at bedtime.   7/30/2023    melatonin 5 MG tablet tablet Take 1 tablet by mouth Every Night.   7/30/2023    metFORMIN (GLUCOPHAGE) 1000 MG tablet Take 1 tablet by mouth 2 (Two) Times a Day With Meals. (Patient taking differently: Take 0.5 tablets by mouth 3 (Three) Times a Day With Meals.)   7/30/2023    thiamine (VITAMIN B-1) 100 MG tablet  tablet Take 1 tablet by mouth 3 (Three) Times a Day.   7/30/2023    cloNIDine (CATAPRES) 0.1 MG tablet Take 1 tablet by mouth Every 6 (Six) Hours As Needed for High Blood Pressure. SBP >160   Unknown at [facility]    Glucagon HCl 1 MG reconstituted solution Inject 1 mg into the appropriate muscle as directed by prescriber As Needed.   Unknown    Nepafenac 0.3 % suspension Administer 1 drop into the left eye every night at bedtime. Start on 8-9-23 for 2 days   Unknown       No Known Allergies    Objective  Objective     Vital Signs:   Temp:  [98.4 øF (36.9 øC)-103.6 øF (39.8 øC)] 99 øF (37.2 øC)  Heart Rate:  [] 89  Resp:  [18-20] 20  BP: (141-176)/() 141/72  Flow (L/min):  [2] 2       Physical Exam  None   Constitutional: Awake, alert  Eyes: PERRLA, sclerae anicteric  HENT: NCAT  Neck: Supple, no thyromegaly, no lymphadenopathy, trachea midline  Respiratory: Clear to auscultation bilaterally, nonlabored respirations   Cardiovascular: RRR, no murmurs, rubs, or gallops, palpable pedal pulses bilaterally  Gastrointestinal: soft, nontender, nondistended  Musculoskeletal: No bilateral ankle edema  Psychiatric: Appropriate affect, cooperative  Neurologic: confused, UTO strength assessment  Skin: No rashes      Result Review:  I have personally reviewed the results from the time of this admission to 7/31/2023 18:44 EDT and agree with these findings:  [x]  Laboratory list / accordion  [x]  Microbiology  [x]  Radiology  [x]  EKG/Telemetry   [x]  Cardiology/Vascular   [x]  Pathology  []  Old records  []  Other:  Most notable findings include:         LAB RESULTS:      Lab 07/31/23  1231   WBC 11.55*   HEMOGLOBIN 9.3*   HEMATOCRIT 29.6*   PLATELETS 256   NEUTROS ABS 9.17*   IMMATURE GRANS (ABS) 0.06*   LYMPHS ABS 1.73   MONOS ABS 0.55   EOS ABS 0.01   MCV 88.9   PROCALCITONIN 0.05   LACTATE 1.1         Lab 07/31/23  1231   SODIUM 138   POTASSIUM 5.2   CHLORIDE 103   CO2 22.0   ANION GAP 13.0   BUN 22   CREATININE 1.23   EGFR 65.2   GLUCOSE 159*   CALCIUM 9.3   MAGNESIUM 1.8         Lab 07/31/23  1231   TOTAL PROTEIN 7.6   ALBUMIN 3.8   GLOBULIN 3.8   ALT (SGPT) 25   AST (SGOT) 25   BILIRUBIN <0.2   ALK PHOS 75   LIPASE 19                     UA          7/31/2023    12:30   Urinalysis   Squamous Epithelial Cells, UA 0-2    Specific [place], UA 1.015    Ketones, UA Negative    Blood, UA Small (1+)    Leukocytes, UA Negative    Nitrite, UA Negative    RBC, UA Too Numerous to Count    WBC, UA 0-2    Bacteria, UA None Seen        Microbiology Results (last 10 days)       Procedure Component Value - Date/Time    Respiratory Panel PCR w/COVID-19(SARS-CoV-2) GIANNI/SIENNA/ANNA/PAD/COR/MAD/ASHIA In-House, NP Swab in UTM/VTM, 3-4 HR TAT - Swab,  Nasopharynx [[id number]]  (Normal) Collected: 07/31/23 1328    Lab Status: Final result Specimen: Swab from Nasopharynx Updated: 07/31/23 1439     ADENOVIRUS, PCR Not Detected     Coronavirus 229E Not Detected     Coronavirus HKU1 Not Detected     Coronavirus NL63 Not Detected     Coronavirus OC43 Not Detected     COVID19 Not Detected     Human Metapneumovirus Not Detected     Human Rhinovirus/Enterovirus Not Detected     Influenza A PCR Not Detected     Influenza B PCR Not Detected     Parainfluenza Virus 1 Not Detected     Parainfluenza Virus 2 Not Detected     Parainfluenza Virus 3 Not Detected     Parainfluenza Virus 4 Not Detected     RSV, PCR Not Detected     Bordetella pertussis pcr Not Detected     Bordetella parapertussis PCR Not Detected     Chlamydophila pneumoniae PCR Not Detected     Mycoplasma pneumo by PCR Not Detected    Narrative:      In the setting of a positive respiratory panel with a viral infection PLUS a negative procalcitonin without other underlying concern for bacterial infection, consider observing off antibiotics or discontinuation of antibiotics and continue supportive care. If the respiratory panel is positive for atypical bacterial infection (Bordetella pertussis, Chlamydophila pneumoniae, or Mycoplasma pneumoniae), consider antibiotic de-escalation to target atypical bacterial infection.            CT Head Without Contrast    Result Date: 7/31/2023  CT HEAD WO CONTRAST Date of Exam: 7/31/2023 2:33 PM EDT Indication: AMS. Comparison: 4/29/2022 MR brain Technique: Axial CT images were obtained of the head without contrast administration.  Automated exposure control and iterative construction methods were used. Findings: Parenchyma:No acute intraparenchymal hemorrhage. No loss of gray-white differentiation to suggest large territory infarct. Mild parenchymal volume loss. Scattered periventricular and subcortical white matter hypodensities, nonspecific, but most often consistent with  small vessel ischemic changes. No midline shift or herniation. Ventricles and extra axial spaces:Prominent ventricles and sulci secondary to volume loss. No extra axial fluid collection seen. Other: Right lens replacement. Paranasal sinuses are clear. Mastoid air cells are clear. Calvarium is intact. Intracranial atherosclerotic calcification is present.     Impression: Impression: No evidence of acute hemorrhage or large territory infarct Chronic changes as above. Electronically Signed: Anil Krishnamurthy  7/31/2023 2:54 PM EDT  Workstation ID: [id number]    CT Abdomen Pelvis With Contrast    Result Date: 7/31/2023  CT ABDOMEN PELVIS W CONTRAST Date of Exam: 7/31/2023 2:33 PM EDT Indication: ams. Comparison: CT of the abdomen and pelvis dated 5/6/2017 Technique: Axial CT images were obtained of the abdomen and pelvis following the uneventful intravenous administration of 85 mL Isovue-300. Reconstructed coronal and sagittal images were also obtained. Automated exposure control and iterative construction methods were used. Findings: Examination is limited due to motion artifact. Liver: The liver is unremarkable in morphology. No focal liver lesion is seen. No biliary dilation is seen. Gallbladder: Gallbladder is not well-visualized due to motion artifact. Pancreas: Unremarkable. Spleen: Unremarkable. Adrenal glands: Unremarkable. Genitourinary tract: Kidneys and ureters appear unremarkable. Bladder wall thickening may be related to underdistention or cystitis. Prostate gland is unremarkable. Gastrointestinal tract: Moderate colonic stool is present. Scattered colonic diverticula are seen. No findings to suggest bowel obstruction. Appendix: No findings to suggest acute appendicitis. Other findings: No free air or free fluid is identified. No pathologically enlarged lymph nodes are seen. Vascular calcifications are seen. IVC is unremarkable. Bones and soft tissues: No acute osseous lesion is identified. There are mild  degenerative changes of the spine. There is 10 mm anterolisthesis at L4-L5. Superficial soft tissues demonstrate no acute abnormality. Lung bases: Scattered bibasilar atelectasis.     Impression: Impression: 1.Examination is limited due to motion artifact. 2.Given this limitation, no acute abnormality identified within the abdomen or pelvis. 3.Moderate colonic stool. 4.Bladder wall thickening may be related to underdistention or cystitis. Please correlate with urinalysis. 5.Additional findings as detailed above. Electronically Signed: Ryan Perales  7/31/2023 2:59 PM EDT  Workstation ID: [id number]    XR Chest 1 View    Result Date: 7/31/2023  XR CHEST 1 VW Date of Exam: 7/31/2023 1:02 PM EDT Indication: AMS Comparison: 12/10/2022 Findings: Heart shadow is mildly enlarged. Vasculature is cephalized. Minimal if any perihilar edema present; overall pattern is similar to the prior study. There may be a small area of focal atelectasis in the medial left lung base. No other focal lung disease, effusion or pneumothorax is seen.     Impression: Impression: Moderate pulmonary venous hypertension. Small focus of left lower lobe atelectasis. Electronically Signed: Wisam Oquendo  7/31/2023 1:35 PM EDT  Workstation ID: [id number]         Assessment & Plan  Assessment & Plan     Active Hospital Problems    Diagnosis  POA    **AMS (altered mental status) [R41.82]  Yes    S/P transmetatarsal amputation of foot, left [Z89.432]  Not Applicable    Neurocognitive disorder [R41.9]  Yes    Type 2 diabetes mellitus [E11.9]  Yes    Essential hypertension [I10]  Yes    Psychotic disorder [F29]  Yes     Omkar Nava is a 67 y.o. male with PMHx significant for dementia w/psychosis, DMII, HTN, polysubstance abuse, osteomyelitis L foot s/p left transmetatarsal amputation 12/2022 who currently resides in nursing home who presented with AMS and fever.    AMS   Fever/Sepsis - concern for CNS infection  - empirically cover for meningitis for now given  AMS/headache and fevers, will use ampicillin, CTX, Vancomycin, acyclovir  - LP attempted in the ER as well as IR without success -mostly due to agitation  - infectious work-up including UA, resp PCR, CT abd/pelvis, CT head, CXR unremarkable for clear source of fever  - could consider partially treated cystitis as etiology, however UA is bland  - de-escalate abx as able, re-attempt LP in the AM    Dementia w/Psychosis:  - PRN geodon and ativan for agtitation  - continue home oral regimen when okay to take PO - appears he is on depakote, PRN atarax and nightly scheduled ativan    DMII  - SSI coverage for now    HTN:  - continue home regimen, PRN IV medications     DVT prophylaxis:  Hold Eliquis, Lovenox for now    CODE STATUS:  Daughter states patient is to remain a full code  Code Status and Medical Interventions:   Ordered at: 07/31/23 1801     Level Of Support Discussed With:    Health Care Surrogate     Code Status (Patient has no pulse and is not breathing):    CPR (Attempt to Resuscitate)     Medical Interventions (Patient has pulse or is breathing):    Full Support     Release to patient:    Routine Release     Total time spent: 78 minutes  Time spent includes time reviewing chart, face-to-face time, counseling patient/family/caregiver, ordering medications/tests/procedures, communicating with other health care professionals, documenting clinical information in the electronic health record, and coordination of care.       Expected Discharge TBD  Expected discharge date/ time has not been documented.     Sarah Gann DO  07/31/23      Electronically signed by Sarah Gann DO at 07/31/23 1906       Prior to Admission Medications       Prescriptions Last Dose Informant Patient Reported? Taking?    acetaminophen (TYLENOL) 325 MG tablet 7/30/2023  Yes Yes    Take 2 tablets by mouth Every 6 (Six) Hours As Needed for Mild Pain, Headache or Fever.    albuterol sulfate  (90 Base) MCG/ACT inhaler Past  Month  Yes Yes    Inhale 2 puffs Every 4 (Four) Hours As Needed for Wheezing or Shortness of Air.    apixaban (ELIQUIS) 2.5 MG tablet tablet 7/31/2023 Nursing Home Yes Yes    Take 1 tablet by mouth 2 (Two) Times a Day. VTE ppx    atorvastatin (LIPITOR) 20 MG tablet 7/30/2023  Yes Yes    Take 1 tablet by mouth Every Night.    brimonidine-timolol (COMBIGAN) 0.2-0.5 % ophthalmic solution 7/30/2023  Yes Yes    Administer 1 drop to both eyes 2 (Two) Times a Day.    Cholecalciferol 50 MCG (2000 UT) capsule 7/30/2023 Other Yes Yes    Take 3 capsules by mouth Every Morning.    difluprednate (DUREZOL) 0.05 % ophthalmic emulsion 7/30/2023  Yes Yes    Administer 1 drop into the left eye 4 (Four) Times a Day.    divalproex (DEPAKOTE) 125 MG DR tablet 7/30/2023  Yes Yes    Take 2 tablets by mouth 2 (Two) Times a Day.    Emollient (Aquaphilic) ointment 7/30/2023  Yes Yes    Apply 1 application  topically to the appropriate area as directed 2 (Two) Times a Day. Apply to scalp, lower back, arms, legs, for dry skin    famotidine (PEPCID) 20 MG tablet 7/30/2023  Yes Yes    Take 1 tablet by mouth 2 (Two) Times a Day.    ferrous sulfate 325 (65 FE) MG tablet 7/30/2023 Other Yes Yes    Take 1 tablet by mouth Daily With Breakfast.    furosemide (LASIX) 20 MG tablet 7/31/2023  Yes Yes    Take 2 tablets by mouth Daily.    hydrALAZINE (APRESOLINE) 25 MG tablet 7/30/2023  No Yes    Take 1 tablet by mouth Every 8 (Eight) Hours.    hydrOXYzine (ATARAX) 50 MG tablet Past Week  Yes Yes    Take 1 tablet by mouth Every 6 (Six) Hours As Needed for Anxiety.    insulin glargine (LANTUS, SEMGLEE) 100 UNIT/ML injection 7/30/2023  Yes Yes    Inject 6 Units under the skin into the appropriate area as directed Every Night.    lisinopril (PRINIVIL,ZESTRIL) 40 MG tablet 7/31/2023  No Yes    Take 1 tablet by mouth Daily.    LORazepam (ATIVAN) 0.5 MG tablet 7/30/2023  Yes Yes    Take 1 tablet by mouth every night at bedtime.    melatonin 5 MG tablet tablet  2023 Nursing Home No Yes    Take 1 tablet by mouth Every Night.    metFORMIN (GLUCOPHAGE) 1000 MG tablet 2023 Nursing Home No Yes    Take 1 tablet by mouth 2 (Two) Times a Day With Meals.    Patient taking differently:  Take 0.5 tablets by mouth 3 (Three) Times a Day With Meals.    thiamine (VITAMIN B-1) 100 MG tablet  tablet 2023  Yes Yes    Take 1 tablet by mouth 3 (Three) Times a Day.    cloNIDine (CATAPRES) 0.1 MG tablet Unknown  Yes No    Take 1 tablet by mouth Every 6 (Six) Hours As Needed for High Blood Pressure. SBP >160    Glucagon HCl 1 MG reconstituted solution Unknown  Yes No    Inject 1 mg into the appropriate muscle as directed by prescriber As Needed.    Nepafenac 0.3 % suspension Unknown  Yes No    Administer 1 drop into the left eye every night at bedtime. Start on 23 for 2 days             Physician Progress Notes (most recent note)        Yash Danielson MD at 23 [facility]7              [facility] Medicine Services  PROGRESS NOTE    Patient Name: Omkar Nava  : 1955  MRN: [id number]    Date of Admission: 2023  Primary Care Physician: Deann Cao MD    Subjective   Subjective     CC:  AMS    HPI:  C/o headache.  Has been calm per nurse.    ROS:  Unable to assess d/t confusion    Objective   Objective     Vital Signs:   Temp:  [96.5 øF (35.8 øC)-99.6 øF (37.6 øC)] 98.2 øF (36.8 øC)  Heart Rate:  [] 71  Resp:  [18] 18  BP: (113-199)/(56-95) 158/70     Physical Exam:  Constitutional: No acute distress, awake, alert  HENT: NCAT, mucous membranes moist  Respiratory: Clear to auscultation bilaterally, respiratory effort normal   Cardiovascular: RRR, no murmurs, rubs, or gallops  Gastrointestinal: Positive bowel sounds, soft, nontender, nondistended  Musculoskeletal: No bilateral ankle edema, amputated toes on left foot  Psychiatric: Appropriate affect, cooperative  Neurologic: Oriented x 1 (to person only), strength symmetric in  all extremities, Cranial Nerves grossly intact to confrontation, speech clear  Skin: No rashes      Results Reviewed:  LAB RESULTS:      Lab 08/02/23  1023 08/02/23  1022 07/31/23  1231   WBC 8.46  --  11.55*   HEMOGLOBIN 8.9*  --  9.3*   HEMATOCRIT 27.6*  --  29.6*   PLATELETS 236  --  256   NEUTROS ABS 5.72  --  9.17*   IMMATURE GRANS (ABS) 0.03  --  0.06*   LYMPHS ABS 1.68  --  1.73   MONOS ABS 0.82  --  0.55   EOS ABS 0.18  --  0.01   MCV 87.9  --  88.9   CRP 0.59*  --   --    PROCALCITONIN 0.09  --  0.05   LACTATE  --  0.8 1.1         Lab 08/02/23  1023 07/31/23  1231   SODIUM 140  140 138   POTASSIUM 4.2  4.2 5.2   CHLORIDE 106  106 103   CO2 24.0  24.0 22.0   ANION GAP 10.0  10.0 13.0   BUN 20  20 22   CREATININE 1.31*  1.31* 1.23   EGFR 60.4  60.4 65.2   GLUCOSE 150*  150* 159*   CALCIUM 8.7  8.7 9.3   MAGNESIUM  --  1.8         Lab 08/02/23  1023 07/31/23  1231   TOTAL PROTEIN 6.4 7.6   ALBUMIN 3.2* 3.8   GLOBULIN 3.2 3.8   ALT (SGPT) 19 25   AST (SGOT) 17 25   BILIRUBIN <0.2 <0.2   ALK PHOS 65 75   LIPASE  --  19                     Brief Urine Lab Results  (Last result in the past 365 days)        Color   Clarity   Blood   Leuk Est   Nitrite   Protein   CREAT   Urine HCG        08/02/23 1319 Yellow   Clear   Negative   Negative   Negative   30 mg/dL (1+)                   Microbiology Results Abnormal       Procedure Component Value - Date/Time    Blood Culture - Blood, Hand, Left [[id number]]  (Normal) Collected: 07/31/23 1246    Lab Status: Preliminary result Specimen: Blood from Hand, Left Updated: 08/02/23 1315     Blood Culture No growth at 2 days    Blood Culture - Blood, Arm, Right [[id number]]  (Normal) Collected: 07/31/23 1246    Lab Status: Preliminary result Specimen: Blood from Arm, Right Updated: 08/02/23 1315     Blood Culture No growth at 2 days    Respiratory Panel PCR w/COVID-19(SARS-CoV-2) GIANNI/SIENNA/ANNA/PAD/COR/MAD/ASHIA In-House, NP Swab in UTM/VTM, 3-4 HR TAT - Swab, Nasopharynx  [[id number]]  (Normal) Collected: 07/31/23 1328    Lab Status: Final result Specimen: Swab from Nasopharynx Updated: 07/31/23 1439     ADENOVIRUS, PCR Not Detected     Coronavirus 229E Not Detected     Coronavirus HKU1 Not Detected     Coronavirus NL63 Not Detected     Coronavirus OC43 Not Detected     COVID19 Not Detected     Human Metapneumovirus Not Detected     Human Rhinovirus/Enterovirus Not Detected     Influenza A PCR Not Detected     Influenza B PCR Not Detected     Parainfluenza Virus 1 Not Detected     Parainfluenza Virus 2 Not Detected     Parainfluenza Virus 3 Not Detected     Parainfluenza Virus 4 Not Detected     RSV, PCR Not Detected     Bordetella pertussis pcr Not Detected     Bordetella parapertussis PCR Not Detected     Chlamydophila pneumoniae PCR Not Detected     Mycoplasma pneumo by PCR Not Detected    Narrative:      In the setting of a positive respiratory panel with a viral infection PLUS a negative procalcitonin without other underlying concern for bacterial infection, consider observing off antibiotics or discontinuation of antibiotics and continue supportive care. If the respiratory panel is positive for atypical bacterial infection (Bordetella pertussis, Chlamydophila pneumoniae, or Mycoplasma pneumoniae), consider antibiotic de-escalation to target atypical bacterial infection.            No radiology results from the last 24 hrs        Current medications:  Scheduled Meds:brimonidine, 1 drop, Both Eyes, TID   And  timolol, 1 drop, Both Eyes, BID  cefTRIAXone, 2,000 mg, Intravenous, Q12H  divalproex, 250 mg, Oral, BID  famotidine, 20 mg, Oral, BID  ferrous sulfate, 325 mg, Oral, Daily With Breakfast  hydrALAZINE, 25 mg, Oral, Q8H  insulin lispro, 2-7 Units, Subcutaneous, 4x Daily AC & at Bedtime  lisinopril, 40 mg, Oral, Daily  LORazepam, 0.5 mg, Oral, Nightly  melatonin, 5 mg, Oral, Nightly  povidone-iodine, 1 application , Topical, Daily  senna-docusate sodium, 2 tablet, Oral,  BID  sodium chloride, 10 mL, Intravenous, Q12H  thiamine, 100 mg, Oral, TID  vancomycin, 1,000 mg, Intravenous, Q18H      Continuous Infusions:Pharmacy to dose vancomycin,       PRN Meds:.  acetaminophen    senna-docusate sodium **AND** polyethylene glycol **AND** bisacodyl **AND** bisacodyl    dextrose    dextrose    glucagon (human recombinant)    hydrALAZINE    hydrOXYzine    LORazepam    ondansetron    Pharmacy to dose vancomycin    [COMPLETED] Insert Peripheral IV **AND** sodium chloride    sodium chloride    sodium chloride    ziprasidone    Assessment & Plan   Assessment & Plan     Active Hospital Problems    Diagnosis  POA    **AMS (altered mental status) [R41.82]  Yes    S/P transmetatarsal amputation of foot, left [Z89.432]  Not Applicable    Neurocognitive disorder [R41.9]  Yes    Type 2 diabetes mellitus [E11.9]  Yes    Essential hypertension [I10]  Yes    Psychotic disorder [F29]  Yes      Resolved Hospital Problems   No resolved problems to display.        Brief Hospital Course to date:  Omkar Nava is a 67 y.o. male with PMHx significant for dementia w/psychosis, DMII, HTN, polysubstance abuse, osteomyelitis L foot s/p left transmetatarsal amputation 12/2022 who currently resides in nursing home who presented with AMS and fever.     AMS   Fever/Sepsis - concern for CNS infection  - empirically cover for meningitis for now given AMS/headache and fevers, will use ampicillin, CTX, Vancomycin, acyclovir  - LP attempted in the ER as well as IR without success -mostly due to agitation  - infectious work-up including UA, resp PCR, CT abd/pelvis, CT head, CXR unremarkable for clear source of fever  - could consider partially treated cystitis as etiology, however UA is bland  -  plan was to try to re-attempt LP today but daughter refused this AM.  Talked to daughter this afternoon and she is now ok with LP, will see if IR can do at this point.  - ID following recs to continue vanc, rocephin likely until  8/7/2023.  Stopped acyclovir.  Patient improved so ID now ok with not doing LP if too difficult to obtain.  Note patient takes ativan qhs so will plan to give 0.5mg of ativan prior to LP tomorrow.     Dementia w/Psychosis:  - PRN geodon and ativan for agtitation  - continue home oral regimen when okay to take PO - appears he is on depakote, PRN atarax and nightly scheduled ativan     DMII  - SSI coverage for now     HTN:  - continue home regimen, PRN IV medications          Expected Discharge Location and Transportation:   Expected Discharge   Expected Discharge Date: 8/4/2023; Expected Discharge Time:      DVT prophylaxis:  No DVT prophylaxis order currently exists.          CODE STATUS:   Code Status and Medical Interventions:   Ordered at: 07/31/23 1801     Level Of Support Discussed With:    Health Care Surrogate     Code Status (Patient has no pulse and is not breathing):    CPR (Attempt to Resuscitate)     Medical Interventions (Patient has pulse or is breathing):    Full Support     Release to patient:    Routine Release       Yash Danielson MD  08/02/23        Electronically signed by Yash Danielson MD at 08/02/23 1526       Physical Therapy Notes (most recent note)    No notes exist for this encounter.       Occupational Therapy Notes (most recent note)    No notes exist for this encounter.

## 2023-08-03 NOTE — PLAN OF CARE
Goal Outcome Evaluation:  Plan of Care Reviewed With: patient           Outcome Evaluation: Pt anxious and agitated at start of shift. Hydroxizine ineffective. Ativan IV given as ordered. No resp distress. SR on tele. Denies CP or SOA. Had x1 episode of epistaxis. Bleeding controlled. Cont on ABX therapy without adverse reaction noted. Cont current POC

## 2023-08-03 NOTE — PLAN OF CARE
Problem: Adult Inpatient Plan of Care  Goal: Plan of Care Review  Flowsheets (Taken 8/3/2023 5550)  Progress: no change  Plan of Care Reviewed With: patient  Outcome Evaluation: Patient A&Ox1, denies having any pain or soa. PRN ativan given x1 as order. Patient in no acute distress this shift. Will continue with POC.   Goal Outcome Evaluation:  Plan of Care Reviewed With: patient        Progress: no change  Outcome Evaluation: Patient A&Ox1, denies having any pain or soa. PRN ativan given x1 as order. Patient in no acute distress this shift. Will continue with POC.

## 2023-08-03 NOTE — PROGRESS NOTES
[facility] Medicine Services  PROGRESS NOTE    Patient Name: Omkar Nava  : 1955  MRN: [id number]    Date of Admission: 2023  Primary Care Physician: Deann Cao MD    Subjective   Subjective     CC:  AMS    HPI:  No events.     ROS:  Unable to assess d/t confusion    Objective   Objective     Vital Signs:   Temp:  [97.3 øF (36.3 øC)-98.6 øF (37 øC)] 97.8 øF (36.6 øC)  Heart Rate:  [63-83] 73  Resp:  [16-18] 16  BP: (149-172)/(79-93) 162/88     Physical Exam:  Constitutional: No acute distress, awake, alert  HENT: NCAT, mucous membranes moist  Respiratory: Clear to auscultation bilaterally, respiratory effort normal   Cardiovascular: RRR, no murmurs, rubs, or gallops  Gastrointestinal: Positive bowel sounds, soft, nontender, nondistended  Musculoskeletal: No bilateral ankle edema, amputated toes on left foot  Psychiatric: Appropriate affect, cooperative, calm  Neurologic: Oriented x 1 (to person only), strength symmetric in all extremities, Cranial Nerves grossly intact to confrontation, speech clear  Skin: No rashes      Results Reviewed:  LAB RESULTS:      Lab 234 23  1023 23  1022 23  1231   WBC 6.41 8.46  --  11.55*   HEMOGLOBIN 8.0* 8.9*  --  9.3*   HEMATOCRIT 25.1* 27.6*  --  29.6*   PLATELETS 205 236  --  256   NEUTROS ABS 3.52 5.72  --  9.17*   IMMATURE GRANS (ABS) 0.02 0.03  --  0.06*   LYMPHS ABS 1.91 1.68  --  1.73   MONOS ABS 0.66 0.82  --  0.55   EOS ABS 0.28 0.18  --  0.01   MCV 88.4 87.9  --  88.9   CRP  --  0.59*  --   --    PROCALCITONIN  --  0.09  --  0.05   LACTATE 0.9  --  0.8 1.1         Lab 23  0424 23  1023 07/31/23  1231   SODIUM 140 140  140 138   POTASSIUM 4.0 4.2  4.2 5.2   CHLORIDE 105 106  106 103   CO2 25.0 24.0  24.0 22.0   ANION GAP 10.0 10.0  10.0 13.0   BUN 18 20  20 22   CREATININE 1.24 1.31*  1.31* 1.23   EGFR 64.5 60.4  60.4 65.2   GLUCOSE 177* 150*  150* 159*   CALCIUM 8.1*  8.7  8.7 9.3   MAGNESIUM  --   --  1.8         Lab 08/03/23  0424 08/02/23  1023 07/31/23  1231   TOTAL PROTEIN 5.6* 6.4 7.6   ALBUMIN 3.1* 3.2* 3.8   GLOBULIN 2.5 3.2 3.8   ALT (SGPT) 15 19 25   AST (SGOT) 13 17 25   BILIRUBIN <0.2 <0.2 <0.2   ALK PHOS 67 65 75   LIPASE  --   --  19                     Brief Urine Lab Results  (Last result in the past 365 days)        Color   Clarity   Blood   Leuk Est   Nitrite   Protein   CREAT   Urine HCG        08/02/23 1319 Yellow   Clear   Negative   Negative   Negative   30 mg/dL (1+)                   Microbiology Results Abnormal       Procedure Component Value - Date/Time    Blood Culture - Blood, Hand, Left [[id number]]  (Normal) Collected: 07/31/23 1246    Lab Status: Preliminary result Specimen: Blood from Hand, Left Updated: 08/03/23 1316     Blood Culture No growth at 3 days    Blood Culture - Blood, Arm, Right [[id number]]  (Normal) Collected: 07/31/23 1246    Lab Status: Preliminary result Specimen: Blood from Arm, Right Updated: 08/03/23 1316     Blood Culture No growth at 3 days    Respiratory Panel PCR w/COVID-19(SARS-CoV-2) GIANNI/SIENNA/ANNA/PAD/COR/MAD/ASHIA In-House, NP Swab in UTM/VTM, 3-4 HR TAT - Swab, Nasopharynx [[id number]]  (Normal) Collected: 07/31/23 1328    Lab Status: Final result Specimen: Swab from Nasopharynx Updated: 07/31/23 1439     ADENOVIRUS, PCR Not Detected     Coronavirus 229E Not Detected     Coronavirus HKU1 Not Detected     Coronavirus NL63 Not Detected     Coronavirus OC43 Not Detected     COVID19 Not Detected     Human Metapneumovirus Not Detected     Human Rhinovirus/Enterovirus Not Detected     Influenza A PCR Not Detected     Influenza B PCR Not Detected     Parainfluenza Virus 1 Not Detected     Parainfluenza Virus 2 Not Detected     Parainfluenza Virus 3 Not Detected     Parainfluenza Virus 4 Not Detected     RSV, PCR Not Detected     Bordetella pertussis pcr Not Detected     Bordetella parapertussis PCR Not Detected     Chlamydophila  pneumoniae PCR Not Detected     Mycoplasma pneumo by PCR Not Detected    Narrative:      In the setting of a positive respiratory panel with a viral infection PLUS a negative procalcitonin without other underlying concern for bacterial infection, consider observing off antibiotics or discontinuation of antibiotics and continue supportive care. If the respiratory panel is positive for atypical bacterial infection (Bordetella pertussis, Chlamydophila pneumoniae, or Mycoplasma pneumoniae), consider antibiotic de-escalation to target atypical bacterial infection.            No radiology results from the last 24 hrs        Current medications:  Scheduled Meds:apixaban, 2.5 mg, Oral, Q12H  brimonidine, 1 drop, Both Eyes, TID   And  timolol, 1 drop, Both Eyes, BID  cefuroxime, 500 mg, Oral, Q12H  divalproex, 250 mg, Oral, BID  doxycycline, 100 mg, Oral, Q12H  famotidine, 20 mg, Oral, BID  ferrous sulfate, 325 mg, Oral, Daily With Breakfast  hydrALAZINE, 25 mg, Oral, Q8H  insulin lispro, 2-7 Units, Subcutaneous, 4x Daily AC & at Bedtime  lisinopril, 40 mg, Oral, Daily  LORazepam, 0.5 mg, Oral, Nightly  melatonin, 5 mg, Oral, Nightly  povidone-iodine, 1 application , Topical, Daily  senna-docusate sodium, 2 tablet, Oral, BID  sodium chloride, 10 mL, Intravenous, Q12H  thiamine, 100 mg, Oral, TID      Continuous Infusions:     PRN Meds:.  acetaminophen    senna-docusate sodium **AND** polyethylene glycol **AND** bisacodyl **AND** bisacodyl    dextrose    dextrose    glucagon (human recombinant)    hydrALAZINE    hydrOXYzine    LORazepam    ondansetron    [COMPLETED] Insert Peripheral IV **AND** sodium chloride    sodium chloride    sodium chloride    sodium chloride    ziprasidone    Assessment & Plan   Assessment & Plan     Active Hospital Problems    Diagnosis  POA    **AMS (altered mental status) [R41.82]  Yes    S/P transmetatarsal amputation of foot, left [Z89.432]  Not Applicable    Neurocognitive disorder [R41.9]  Yes     Type 2 diabetes mellitus [E11.9]  Yes    Essential hypertension [I10]  Yes    Psychotic disorder [F29]  Yes      Resolved Hospital Problems   No resolved problems to display.        Brief Hospital Course to date:  Omkar Nava is a 67 y.o. male with PMHx significant for dementia w/psychosis, DMII, HTN, polysubstance abuse, osteomyelitis L foot s/p left transmetatarsal amputation 12/2022 who currently resides in nursing home who presented with AMS and fever.     AMS   Fever/Sepsis - concern for CNS infection  - empirically cover for meningitis for now given AMS/headache and fevers, will use ampicillin, CTX, Vancomycin, acyclovir  - LP attempted in the ER as well as IR without success -mostly due to agitation  - infectious work-up including UA, resp PCR, CT abd/pelvis, CT head, CXR unremarkable for clear source of fever  - could consider partially treated cystitis as etiology, however UA is bland  -  plan was to try to re-attempt LP today but daughter refused this AM.  Talked to daughter this afternoon and she is now ok with LP, will see if IR can do at this point.  - ID following recs to continue vanc, rocephin likely until 8/7/2023.  Stopped acyclovir.  D/w Dr. Gordon yesterday who believes patient close to baseline and with difficulties with patient cooperating with LP who is now ok with not attempting again.      Dementia w/Psychosis:  - PRN geodon and ativan for agtitation  - continue home oral regimen when okay to take PO - appears he is on depakote, PRN atarax and nightly scheduled ativan     DMII  - SSI coverage for now     HTN:  - continue home regimen, PRN IV medications      Called and d/w daughter, Idalia Lerma over the phone again on 8/3/2023    Expected Discharge Location and Transportation: patient with LTC bed at [facility] and rehab daughter prefers for patient to go somewhere different.  CM following  Expected Discharge   Expected Discharge Date: 8/4/2023; Expected Discharge Time:      DVT  prophylaxis:  Medical DVT prophylaxis orders are present.          CODE STATUS:   Code Status and Medical Interventions:   Ordered at: 07/31/23 1801     Level Of Support Discussed With:    Health Care Surrogate     Code Status (Patient has no pulse and is not breathing):    CPR (Attempt to Resuscitate)     Medical Interventions (Patient has pulse or is breathing):    Full Support     Release to patient:    Routine Release       Yash Danielson MD  08/03/23

## 2023-08-03 NOTE — CASE MANAGEMENT/SOCIAL WORK
Continued Stay Note  [facility]     Patient Name: Omkar Nava  MRN: [id number]  Today's Date: 8/3/2023    Admit Date: 7/31/2023    Plan: LTC   Discharge Plan       Row Name 08/03/23 1304       Plan    Plan LTC    Patient/Family in Agreement with Plan yes    Plan Comments Spoke with daughter by phone. Patient has a LTC bed at [facility]. Daughter doesn't really want him to go back there. Explained to daughter that it will be really hard to found another LTC bed and in [place]. CM explained that she should keep the LTC bed at [facility]. That CM will fax him out to all the LTC places in [place] to see if I can help with the search for another LTC bed but may have to go back to LTC at [facility]. CM will follow.    Final Discharge Disposition Code 04 - intermediate care facility                   Discharge Codes    No documentation.                 Expected Discharge Date and Time       Expected Discharge Date Expected Discharge Time    Aug 4, 2023               Edie Ochoa RN

## 2023-08-03 NOTE — PROGRESS NOTES
[facility]    INFECTIOUS DISEASE PROGRESS NOTE    Omkar Nava  1955  [id number]    Consult: 8/1/23    Admit date: 7/31/2023    Requesting Provider: No ref. provider found  Evaluating physician: Rayray Gordon MD  Reason for Consultation: Fever, sepsis, confusion  Chief Complaint: Above      Subjective   History of present illness:  Patient is a  67 y.o.  Yr old male With a history of diabetes mellitus type 2, peripheral neuropathy, history of alcohol and cocaine and marijuana use, essential hypertension, GERD, psychiatric issues in the past, left foot osteomyelitis status post transmetatarsal amputation December 2022, status post IV antibiotics and oral which completed around January 2023.  Patient was at the nursing home and became increasingly confused and was brought in by daughter and admitted to [facility] on 7/31/2023.  Patient is a poor historian.  He was documented to have a fever of greater than 102.  Urinalysis had hematuria without significant pyuria.  Chest x-ray said possible left lower lobe atelectasis.  CT scan of the brain without acute changes.  CT scan of the abdomen and pelvis with some bladder wall thickening.  I was consulted on 8/1/2023 for further evaluation and treatment.    8/2/2023 history reviewed.  Mental status is down to his baseline which is oftentimes confused.  Tolerating vancomycin, ceftriaxone, and acyclovir but stopping acyclovir today.    8/3/2023 history reviewed.  No high fevers or chills.  Tolerating vancomycin and ceftriaxone.  Cultures have been negative.  No urine culture sent.    Past Medical History:   Diagnosis Date    Anemia     Dementia     Diabetes mellitus     Dysphagia     GERD (gastroesophageal reflux disease)     History of alcohol abuse     History of cocaine use     History of marijuana use     Hypertension     Osteomyelitis     Poor historian     records obtained from nursing home records & his family     Visual impairment        Past Surgical History:   Procedure Laterality Date    AMPUTATION FOOT Left 10/18/2022    Procedure: PARTIAL FIRST RAY AMPUTATION LEFT;  Surgeon: Yeison Petty MD;  Location:  [facility];  Service: Orthopedics;  Laterality: Left;    AMPUTATION FOOT Left 12/5/2022    Procedure: Transmetatarsal of Left Foot;  Surgeon: Yeison Petty MD;  Location:  [facility];  Service: Orthopedics;  Laterality: Left;    EYE SURGERY         Pediatric History   Patient Parents    Not on file     Other Topics Concern    Not on file   Social History Narrative    Caffeine 0-1 servings per day    Patient lives at home .   Nursing home resident, no current alcohol or drug use or cigarette use    family history includes Diabetes in his brother, brother, father, maternal grandfather, maternal grandmother, mother, and sister; Hypertension in his brother, brother, father, and mother.    No Known Allergies    Immunization History   Administered Date(s) Administered    COVID-19 (MODERNA) 1st,2nd,3rd Dose Monovalent 06/09/2021, 07/19/2021    COVID-19 (MODERNA) Monovalent Original Booster 04/11/2022       Medication:    Current Facility-Administered Medications:     [START ON 8/4/2023] ! Vancomycin level ordered, please hold 0900 dose until evaluated by pharmacy, , Does not apply, Once, Sarkis Easley, [facility]    acetaminophen (TYLENOL) tablet 650 mg, 650 mg, Oral, Q4H PRN, Sarah Gann DO, 650 mg at 08/02/23 1008    apixaban (ELIQUIS) tablet 2.5 mg, 2.5 mg, Oral, Q12H, Yash Danielson MD, 2.5 mg at 08/03/23 1143    sennosides-docusate (PERICOLACE) 8.6-50 MG per tablet 2 tablet, 2 tablet, Oral, BID, 2 tablet at 08/03/23 0913 **AND** polyethylene glycol (MIRALAX) packet 17 g, 17 g, Oral, Daily PRN **AND** bisacodyl (DULCOLAX) EC tablet 5 mg, 5 mg, Oral, Daily PRN **AND** bisacodyl (DULCOLAX) suppository 10 mg, 10 mg, Rectal, Daily PRN, Azeem, Sarah R, DO    brimonidine (ALPHAGAN) 0.2 % ophthalmic solution 1 drop, 1  drop, Both Eyes, TID, 1 drop at 08/03/23 0928 **AND** timolol (TIMOPTIC) 0.5 % ophthalmic solution 1 drop, 1 drop, Both Eyes, BID, Sarah Gann DO, 1 drop at 08/03/23 0928    cefTRIAXone (ROCEPHIN) 2000 mg/100 mL 0.9% NS IVPB (MBP), 2,000 mg, Intravenous, Q12H, Sarah Gann DO, Last Rate: 200 mL/hr at 08/03/23 1143, 2,000 mg at 08/03/23 1143    dextrose (D50W) (25 g/50 mL) IV injection 25 g, 25 g, Intravenous, Q15 Min PRN, Sarah Gann DO    dextrose (GLUTOSE) oral gel 15 g, 15 g, Oral, Q15 Min PRN, Sarah Gann DO    divalproex (DEPAKOTE) DR tablet 250 mg, 250 mg, Oral, BID, Sarah Gann DO, 250 mg at 08/03/23 0927    famotidine (PEPCID) tablet 20 mg, 20 mg, Oral, BID, Sarah Gann DO, 20 mg at 08/03/23 0927    ferrous sulfate tablet 325 mg, 325 mg, Oral, Daily With Breakfast, Sarah Gann DO, 325 mg at 08/03/23 0927    glucagon (GLUCAGEN) injection 1 mg, 1 mg, Intramuscular, Q15 Min PRN, Sarah Gann DO    hydrALAZINE (APRESOLINE) injection 10 mg, 10 mg, Intravenous, Q6H PRN, Sarah Gann DO, 10 mg at 08/01/23 2239    hydrALAZINE (APRESOLINE) tablet 25 mg, 25 mg, Oral, Q8H, Sarah Gann DO, 25 mg at 08/03/23 0551    hydrOXYzine (ATARAX) tablet 50 mg, 50 mg, Oral, Q6H PRN, Sarah Gann DO, 50 mg at 08/02/23 2023    Insulin Lispro (humaLOG) injection 2-7 Units, 2-7 Units, Subcutaneous, 4x Daily AC & at Bedtime, Sarah Gann DO, 2 Units at 08/03/23 1143    lisinopril (PRINIVIL,ZESTRIL) tablet 40 mg, 40 mg, Oral, Daily, Sarah Gann DO, 40 mg at 08/03/23 0927    LORazepam (ATIVAN) injection 0.5 mg, 0.5 mg, Intravenous, Q4H PRN, Sarah Gann, DO, 0.5 mg at 08/02/23 2353    LORazepam (ATIVAN) tablet 0.5 mg, 0.5 mg, Oral, Nightly, Sarah Gann, DO, 0.5 mg at 08/02/23 2013    melatonin tablet 5 mg, 5 mg, Oral, Nightly, Sarah Gann DO, 5 mg at 08/02/23 2013    ondansetron (ZOFRAN) injection 4 mg, 4 mg, Intravenous, Q6H PRN,  "Sarah Gann DO    Pharmacy to dose vancomycin, , Does not apply, Continuous PRN, Sarah Gann DO    povidone-iodine 10 % swab 1 each, 1 application , Topical, Daily, Yash Danielson MD, 1 each at 08/03/23 0928    [COMPLETED] Insert Peripheral IV, , , Once **AND** sodium chloride 0.9 % flush 10 mL, 10 mL, Intravenous, PRN, Sarah Gann DO    sodium chloride 0.9 % flush 10 mL, 10 mL, Intravenous, Q12H, Sarah Gann DO, 10 mL at 08/03/23 0929    sodium chloride 0.9 % flush 10 mL, 10 mL, Intravenous, PRN, Sarah Gann DO    sodium chloride 0.9 % infusion 40 mL, 40 mL, Intravenous, PRN, Sarah Gann DO    sodium chloride nasal spray 2 spray, 2 spray, Each Nare, PRN, Yash Danielson MD    thiamine (VITAMIN B-1) tablet 100 mg, 100 mg, Oral, TID, Sarah Gann DO, 100 mg at 08/03/23 0927    vancomycin (VANCOCIN) 1000 mg/200 mL dextrose 5% IVPB, 1,000 mg, Intravenous, Q18H, Juan Carlos Fontana, PharmD, 1,000 mg at 08/02/23 2158    ziprasidone (GEODON) injection 10 mg, 10 mg, Intramuscular, Q4H PRN, Sarah Gann DO    Please refer to the medical record for a full medication list    Review of Systems: Unable to obtain secondary to mental status.      Physical Exam:   Vital Signs   Temp:  [97.3 øF (36.3 øC)-98.6 øF (37 øC)] 97.8 øF (36.6 øC)  Heart Rate:  [63-83] 68  Resp:  [16-18] 16  BP: (149-172)/(79-93) 162/88    Temp  Min: 97.3 øF (36.3 øC)  Max: 98.6 øF (37 øC)  BP  Min: 149/88  Max: 172/93  Pulse  Min: 63  Max: 83  Resp  Min: 16  Max: 18  SpO2  Min: 89 %  Max: 97 %    Blood pressure 162/88, pulse 68, temperature 97.8 øF (36.6 øC), temperature source Axillary, resp. rate 16, height 182.9 cm (72\"), weight 85.7 kg (189 lb), SpO2 97 %.  GENERAL: Awake and less confused, in Moderate distress. Appears older than stated age.  Resting in bed.  HEENT:  Normocephalic, atraumatic.  Oropharynx without thrush. Dentition in good repair. No cervical adenopathy. No neck masses.  Ears " externally normal, Nose externally normal.  EYES: No conjunctival injection. No icterus. EOM full.  LYMPHATICS: No lymphadenopathy of the neck or axillary or inguinal regions.   HEART: No murmur, gallop, or pericardial friction rub. Reg rate rhythm, No JVD at 45 degrees.  LUNGS: Few crackles left base. No respiratory distress, no use of accessory muscles.  ABDOMEN: Soft, nontender, nondistended. No appreciable HSM.  Bowel sounds normal.  SKIN: Warm and dry without cutaneous eruptions.  No nodules.  Left foot well-healed transmetatarsal amputation site.  PSYCHIATRIC: Mental status lucid. No confusion.  EXT:  No cellulitic change.  NEURO: Oriented to name, nonfocal    Results Review:   I reviewed the patient's new clinical results.  I reviewed the patient's new imaging results and agree with the interpretation.  I reviewed the patient's other test results and agree with the interpretation    Results from last 7 days   Lab Units 08/03/23  0424 08/02/23  1023 07/31/23  1231   WBC 10*3/mm3 6.41 8.46 11.55*   HEMOGLOBIN g/dL 8.0* 8.9* 9.3*   HEMATOCRIT % 25.1* 27.6* 29.6*   PLATELETS 10*3/mm3 205 236 256       Results from last 7 days   Lab Units 08/03/23  0424   SODIUM mmol/L 140   POTASSIUM mmol/L 4.0   CHLORIDE mmol/L 105   CO2 mmol/L 25.0   BUN mg/dL 18   CREATININE mg/dL 1.24   GLUCOSE mg/dL 177*   CALCIUM mg/dL 8.1*       Results from last 7 days   Lab Units 08/03/23  0424   ALK PHOS U/L 67   BILIRUBIN mg/dL <0.2   ALT (SGPT) U/L 15   AST (SGOT) U/L 13           Results from last 7 days   Lab Units 08/02/23  1023   CRP mg/dL 0.59*     Results from last 7 days   Lab Units 08/02/23  1023   VANCOMYCIN RM mcg/mL 21.50     Results from last 7 days   Lab Units 08/03/23  0424   LACTATE mmol/L 0.9       Estimated Creatinine Clearance: 72 mL/min (by C-G formula based on SCr of 1.24 mg/dL).  CPK          8/2/2023    10:23   Common Labsle   Creatine Kinase 463       Procalitonin Results:      Lab 08/02/23  1023 07/31/23  1233    PROCALCITONIN 0.09 0.05        Brief Urine Lab Results  (Last result in the past 365 days)        Color   Clarity   Blood   Leuk Est   Nitrite   Protein   CREAT   Urine HCG        08/02/23 1319 Yellow   Clear   Negative   Negative   Negative   30 mg/dL (1+)                  No results found for: SITE, ALLENTEST, PHART, EJZ7PWO, PO2ART, QLM1XYP, BASEEXCESS, S4KCHZZE, HGBBG, HCTABG, OXYHEMOGLOBI, METHHGBN, CARBOXYHGB, CO2CT, BAROMETRIC, MODALITY, FIO2     Microbiology:  Blood Culture   Date Value Ref Range Status   07/31/2023 No growth at 24 hours  Preliminary   07/31/2023 No growth at 24 hours  Preliminary     No results found for: BCIDPCR, CXREFLEX, CSFCX, CULTURETIS  No results found for: CULTURES, HSVCX, URCX  No results found for: EYECULTURE, GCCX, HSVCULTURE, LABHSV  No results found for: LEGIONELLA, MRSACX, MUMPSCX, MYCOPLASCX  No results found for: NOCARDIACX, STOOLCX  No results found for: THROATCX, UNSTIMCULT, URINECX, CULTURE, VZVCULTUR  No results found for: VIRALCULTU, WOUNDCX     Blood Culture   Date Value Ref Range Status   07/31/2023 No growth at 24 hours  Preliminary   07/31/2023 No growth at 24 hours  Preliminary   (      Radiology:  Imaging Results (Last 72 Hours)       Procedure Component Value Units Date/Time    IR LUMBAR PUNCTURE DIAGNOSTIC [[id number]] Resulted: 07/31/23 1806     Updated: 07/31/23 1809    CT Abdomen Pelvis With Contrast [[id number]] Collected: 07/31/23 1452     Updated: 07/31/23 1502    Narrative:      CT ABDOMEN PELVIS W CONTRAST    Date of Exam: 7/31/2023 2:33 PM EDT    Indication: ams.    Comparison: CT of the abdomen and pelvis dated 5/6/2017    Technique: Axial CT images were obtained of the abdomen and pelvis following the uneventful intravenous administration of 85 mL Isovue-300. Reconstructed coronal and sagittal images were also obtained. Automated exposure control and iterative   construction methods were used.    Findings:    Examination is limited due to motion  artifact.    Liver: The liver is unremarkable in morphology. No focal liver lesion is seen. No biliary dilation is seen.    Gallbladder: Gallbladder is not well-visualized due to motion artifact.    Pancreas: Unremarkable.    Spleen: Unremarkable.    Adrenal glands: Unremarkable.    Genitourinary tract: Kidneys and ureters appear unremarkable. Bladder wall thickening may be related to underdistention or cystitis. Prostate gland is unremarkable.    Gastrointestinal tract: Moderate colonic stool is present. Scattered colonic diverticula are seen. No findings to suggest bowel obstruction.    Appendix: No findings to suggest acute appendicitis.    Other findings: No free air or free fluid is identified. No pathologically enlarged lymph nodes are seen. Vascular calcifications are seen. IVC is unremarkable.    Bones and soft tissues: No acute osseous lesion is identified. There are mild degenerative changes of the spine. There is 10 mm anterolisthesis at L4-L5. Superficial soft tissues demonstrate no acute abnormality.    Lung bases: Scattered bibasilar atelectasis.      Impression:      Impression:  1.Examination is limited due to motion artifact.  2.Given this limitation, no acute abnormality identified within the abdomen or pelvis.  3.Moderate colonic stool.  4.Bladder wall thickening may be related to underdistention or cystitis. Please correlate with urinalysis.  5.Additional findings as detailed above.    Electronically Signed: Ryan Perales    7/31/2023 2:59 PM EDT    Workstation ID: [id number]    CT Head Without Contrast [[id number]] Collected: 07/31/23 1452     Updated: 07/31/23 1457    Narrative:      CT HEAD WO CONTRAST    Date of Exam: 7/31/2023 2:33 PM EDT    Indication: AMS.    Comparison: 4/29/2022 MR brain    Technique: Axial CT images were obtained of the head without contrast administration.  Automated exposure control and iterative construction methods were used.      Findings:  Parenchyma:No acute  intraparenchymal hemorrhage. No loss of gray-white differentiation to suggest large territory infarct. Mild parenchymal volume loss. Scattered periventricular and subcortical white matter hypodensities, nonspecific, but most often   consistent with small vessel ischemic changes. No midline shift or herniation.    Ventricles and extra axial spaces:Prominent ventricles and sulci secondary to volume loss. No extra axial fluid collection seen.    Other: Right lens replacement. Paranasal sinuses are clear. Mastoid air cells are clear. Calvarium is intact. Intracranial atherosclerotic calcification is present.      Impression:      Impression:  No evidence of acute hemorrhage or large territory infarct    Chronic changes as above.        Electronically Signed: Anil Krishnamurthy    7/31/2023 2:54 PM EDT    Workstation ID: [id number]            IMPRESSION:     1.  Sepsis, present on admission, sources could include a cystitis with hematuria but no significant pyuria, less likely aspiration pneumonia left lower lobe versus atelectasis, possible encephalitis not seen on CT scan, and unable to get lumbar puncture secondary to lack of cooperation, versus other.  Usual organisms would include Staphylococcus, gram-negative rods, HSV.  Clinically resolved.  2.  Encephalopathy, toxic and metabolic.  Also positive drug screen for benzodiazepines.  He also has some baseline psychiatric issues.  2a.  Doubt encephalitis.  3.  Leukocytosis, neutrophilic related to above issues.  Resolved.  4.  Anemia, chronic disease.  Slightly worse.  5.  Hematuria, related to cystitis versus other.  6.  Diabetes mellitus type 2 with increased risk for infection.  7.  Hypocalcemia 8.1, worse.    Plan:    1.  Diagnostically, continue to follow patient's physical exam, CBC, CMP, CRP, can hold on lumbar puncture if difficult to obtain, but follow cultures that have been obtained.  2.  Therapeutically, continue vancomycin, ceftriaxone, discontinue acyclovir.   Changed to doxycycline and cefuroxime until 8/7/2023  3.  Supportive care.    I discussed the patient's findings and my recommendations with patient and nursing staff    Thank you for asking me to see Omkar Nava.  Our group would be pleased to follow this patient over the course of their hospitalization and assist with outpatient antimicrobial therapy, as indicated.  Further recommendations depend on the results of the cultures and clinical course.  Increased risk for adverse drug reactions, complications of IV access, readmission.  I discussed with the patient's family.  Discussed with Dr. Danielson.    Rayray Gordon MD  8/3/2023

## 2023-08-04 LAB
GLUCOSE BLDC GLUCOMTR-MCNC: 222 MG/DL (ref 70–130)
GLUCOSE BLDC GLUCOMTR-MCNC: 235 MG/DL (ref 70–130)
GLUCOSE BLDC GLUCOMTR-MCNC: 250 MG/DL (ref 70–130)
GLUCOSE BLDC GLUCOMTR-MCNC: 296 MG/DL (ref 70–130)

## 2023-08-04 PROCEDURE — 97165 OT EVAL LOW COMPLEX 30 MIN: CPT

## 2023-08-04 PROCEDURE — 97162 PT EVAL MOD COMPLEX 30 MIN: CPT

## 2023-08-04 PROCEDURE — 99232 SBSQ HOSP IP/OBS MODERATE 35: CPT | Performed by: INTERNAL MEDICINE

## 2023-08-04 PROCEDURE — 82948 REAGENT STRIP/BLOOD GLUCOSE: CPT

## 2023-08-04 PROCEDURE — 63710000001 INSULIN LISPRO (HUMAN) PER 5 UNITS: Performed by: INTERNAL MEDICINE

## 2023-08-04 PROCEDURE — 25010000002 HYDRALAZINE PER 20 MG: Performed by: INTERNAL MEDICINE

## 2023-08-04 RX ADMIN — TIMOLOL MALEATE 1 DROP: 5 SOLUTION/ DROPS OPHTHALMIC at 09:34

## 2023-08-04 RX ADMIN — DOXYCYCLINE 100 MG: 100 CAPSULE ORAL at 09:33

## 2023-08-04 RX ADMIN — Medication 10 ML: at 20:45

## 2023-08-04 RX ADMIN — THIAMINE HCL TAB 100 MG 100 MG: 100 TAB at 20:47

## 2023-08-04 RX ADMIN — HYDRALAZINE HYDROCHLORIDE 10 MG: 20 INJECTION INTRAMUSCULAR; INTRAVENOUS at 15:53

## 2023-08-04 RX ADMIN — BRIMONIDINE TARTRATE 1 DROP: 2 SOLUTION OPHTHALMIC at 09:34

## 2023-08-04 RX ADMIN — INSULIN LISPRO 4 UNITS: 100 INJECTION, SOLUTION INTRAVENOUS; SUBCUTANEOUS at 20:44

## 2023-08-04 RX ADMIN — FAMOTIDINE 20 MG: 20 TABLET, FILM COATED ORAL at 09:34

## 2023-08-04 RX ADMIN — TIMOLOL MALEATE 1 DROP: 5 SOLUTION/ DROPS OPHTHALMIC at 20:49

## 2023-08-04 RX ADMIN — DIVALPROEX SODIUM 250 MG: 250 TABLET, DELAYED RELEASE ORAL at 09:34

## 2023-08-04 RX ADMIN — CEFUROXIME AXETIL 500 MG: 250 TABLET, FILM COATED ORAL at 20:47

## 2023-08-04 RX ADMIN — SENNOSIDES AND DOCUSATE SODIUM 2 TABLET: 50; 8.6 TABLET ORAL at 09:33

## 2023-08-04 RX ADMIN — LISINOPRIL 40 MG: 40 TABLET ORAL at 09:34

## 2023-08-04 RX ADMIN — FERROUS SULFATE TAB 325 MG (65 MG ELEMENTAL FE) 325 MG: 325 (65 FE) TAB at 09:33

## 2023-08-04 RX ADMIN — LORAZEPAM 0.5 MG: 0.5 TABLET ORAL at 20:47

## 2023-08-04 RX ADMIN — Medication 5 MG: at 20:47

## 2023-08-04 RX ADMIN — INSULIN LISPRO 3 UNITS: 100 INJECTION, SOLUTION INTRAVENOUS; SUBCUTANEOUS at 17:39

## 2023-08-04 RX ADMIN — INSULIN LISPRO 4 UNITS: 100 INJECTION, SOLUTION INTRAVENOUS; SUBCUTANEOUS at 09:48

## 2023-08-04 RX ADMIN — ACETAMINOPHEN 650 MG: 325 TABLET ORAL at 18:24

## 2023-08-04 RX ADMIN — DIVALPROEX SODIUM 250 MG: 250 TABLET, DELAYED RELEASE ORAL at 21:11

## 2023-08-04 RX ADMIN — THIAMINE HCL TAB 100 MG 100 MG: 100 TAB at 15:49

## 2023-08-04 RX ADMIN — APIXABAN 2.5 MG: 2.5 TABLET, FILM COATED ORAL at 20:47

## 2023-08-04 RX ADMIN — APIXABAN 2.5 MG: 2.5 TABLET, FILM COATED ORAL at 09:34

## 2023-08-04 RX ADMIN — HYDROXYZINE HYDROCHLORIDE 50 MG: 25 TABLET, FILM COATED ORAL at 05:04

## 2023-08-04 RX ADMIN — THIAMINE HCL TAB 100 MG 100 MG: 100 TAB at 09:34

## 2023-08-04 RX ADMIN — POVIDONE-IODINE 1 EACH: 10 SOLUTION TOPICAL at 09:33

## 2023-08-04 RX ADMIN — BRIMONIDINE TARTRATE 1 DROP: 2 SOLUTION OPHTHALMIC at 20:49

## 2023-08-04 RX ADMIN — Medication 10 ML: at 09:36

## 2023-08-04 RX ADMIN — HYDRALAZINE HYDROCHLORIDE 10 MG: 20 INJECTION INTRAMUSCULAR; INTRAVENOUS at 04:12

## 2023-08-04 RX ADMIN — HYDRALAZINE HYDROCHLORIDE 25 MG: 25 TABLET, FILM COATED ORAL at 14:09

## 2023-08-04 RX ADMIN — DOXYCYCLINE 100 MG: 100 CAPSULE ORAL at 20:47

## 2023-08-04 RX ADMIN — HYDRALAZINE HYDROCHLORIDE 25 MG: 25 TABLET, FILM COATED ORAL at 20:48

## 2023-08-04 RX ADMIN — FAMOTIDINE 20 MG: 20 TABLET, FILM COATED ORAL at 20:47

## 2023-08-04 RX ADMIN — INSULIN LISPRO 3 UNITS: 100 INJECTION, SOLUTION INTRAVENOUS; SUBCUTANEOUS at 11:44

## 2023-08-04 RX ADMIN — BRIMONIDINE TARTRATE 1 DROP: 2 SOLUTION OPHTHALMIC at 15:49

## 2023-08-04 RX ADMIN — CEFUROXIME AXETIL 500 MG: 250 TABLET, FILM COATED ORAL at 09:33

## 2023-08-04 NOTE — PROGRESS NOTES
[facility] Medicine Services  PROGRESS NOTE    Patient Name: Omkar Nava  : 1955  MRN: [id number]    Date of Admission: 2023  Primary Care Physician: Deann Cao MD    Subjective   Subjective     CC:  AMS    HPI:  Sister at bedside, believes patient back to his baseline.  He does now know that in [place]. Denies h/a.     ROS:  Unable to assess d/t confusion    Objective   Objective     Vital Signs:   Temp:  [96.7 øF (35.9 øC)-98.2 øF (36.8 øC)] 98.2 øF (36.8 øC)  Heart Rate:  [66-81] 75  Resp:  [18] 18  BP: (148-187)/() 181/84  Flow (L/min):  [3] 3     Physical Exam:  Constitutional: No acute distress, awake, alert, sister at bedside  HENT: NCAT, mucous membranes moist  Respiratory: Clear to auscultation bilaterally, respiratory effort normal   Cardiovascular: RRR, no murmurs, rubs, or gallops  Gastrointestinal: Positive bowel sounds, soft, nontender, nondistended  Musculoskeletal: No bilateral ankle edema, amputated toes on left foot  Psychiatric: Appropriate affect, cooperative, calm  Neurologic: Oriented roughlyx 2 (can now state that in [place]), strength symmetric in all extremities, Cranial Nerves grossly intact to confrontation, speech clear  Skin: No rashes      Results Reviewed:  LAB RESULTS:      Lab 23  0424 23  1023 23  1022 23  1231   WBC 6.41 8.46  --  11.55*   HEMOGLOBIN 8.0* 8.9*  --  9.3*   HEMATOCRIT 25.1* 27.6*  --  29.6*   PLATELETS 205 236  --  256   NEUTROS ABS 3.52 5.72  --  9.17*   IMMATURE GRANS (ABS) 0.02 0.03  --  0.06*   LYMPHS ABS 1.91 1.68  --  1.73   MONOS ABS 0.66 0.82  --  0.55   EOS ABS 0.28 0.18  --  0.01   MCV 88.4 87.9  --  88.9   CRP  --  0.59*  --   --    PROCALCITONIN  --  0.09  --  0.05   LACTATE 0.9  --  0.8 1.1         Lab 23  0424 23  1023 23  1231   SODIUM 140 140  140 138   POTASSIUM 4.0 4.2  4.2 5.2   CHLORIDE 105 106  106 103   CO2 25.0 24.0  24.0 22.0   ANION  GAP 10.0 10.0  10.0 13.0   BUN 18 20  20 22   CREATININE 1.24 1.31*  1.31* 1.23   EGFR 64.5 60.4  60.4 65.2   GLUCOSE 177* 150*  150* 159*   CALCIUM 8.1* 8.7  8.7 9.3   MAGNESIUM  --   --  1.8         Lab 08/03/23  0424 08/02/23  1023 07/31/23  1231   TOTAL PROTEIN 5.6* 6.4 7.6   ALBUMIN 3.1* 3.2* 3.8   GLOBULIN 2.5 3.2 3.8   ALT (SGPT) 15 19 25   AST (SGOT) 13 17 25   BILIRUBIN <0.2 <0.2 <0.2   ALK PHOS 67 65 75   LIPASE  --   --  19                     Brief Urine Lab Results  (Last result in the past 365 days)        Color   Clarity   Blood   Leuk Est   Nitrite   Protein   CREAT   Urine HCG        08/02/23 1319 Yellow   Clear   Negative   Negative   Negative   30 mg/dL (1+)                   Microbiology Results Abnormal       Procedure Component Value - Date/Time    Blood Culture - Blood, Hand, Left [[id number]]  (Normal) Collected: 07/31/23 1246    Lab Status: Preliminary result Specimen: Blood from Hand, Left Updated: 08/04/23 1316     Blood Culture No growth at 4 days    Blood Culture - Blood, Arm, Right [[id number]]  (Normal) Collected: 07/31/23 1246    Lab Status: Preliminary result Specimen: Blood from Arm, Right Updated: 08/04/23 1316     Blood Culture No growth at 4 days    Respiratory Panel PCR w/COVID-19(SARS-CoV-2) GIANNI/SIENNA/ANNA/PAD/COR/MAD/ASHIA In-House, NP Swab in UTM/VTM, 3-4 HR TAT - Swab, Nasopharynx [[id number]]  (Normal) Collected: 07/31/23 1328    Lab Status: Final result Specimen: Swab from Nasopharynx Updated: 07/31/23 1439     ADENOVIRUS, PCR Not Detected     Coronavirus 229E Not Detected     Coronavirus HKU1 Not Detected     Coronavirus NL63 Not Detected     Coronavirus OC43 Not Detected     COVID19 Not Detected     Human Metapneumovirus Not Detected     Human Rhinovirus/Enterovirus Not Detected     Influenza A PCR Not Detected     Influenza B PCR Not Detected     Parainfluenza Virus 1 Not Detected     Parainfluenza Virus 2 Not Detected     Parainfluenza Virus 3 Not Detected      Parainfluenza Virus 4 Not Detected     RSV, PCR Not Detected     Bordetella pertussis pcr Not Detected     Bordetella parapertussis PCR Not Detected     Chlamydophila pneumoniae PCR Not Detected     Mycoplasma pneumo by PCR Not Detected    Narrative:      In the setting of a positive respiratory panel with a viral infection PLUS a negative procalcitonin without other underlying concern for bacterial infection, consider observing off antibiotics or discontinuation of antibiotics and continue supportive care. If the respiratory panel is positive for atypical bacterial infection (Bordetella pertussis, Chlamydophila pneumoniae, or Mycoplasma pneumoniae), consider antibiotic de-escalation to target atypical bacterial infection.            No radiology results from the last 24 hrs        Current medications:  Scheduled Meds:apixaban, 2.5 mg, Oral, Q12H  brimonidine, 1 drop, Both Eyes, TID   And  timolol, 1 drop, Both Eyes, BID  cefuroxime, 500 mg, Oral, Q12H  divalproex, 250 mg, Oral, BID  doxycycline, 100 mg, Oral, Q12H  famotidine, 20 mg, Oral, BID  ferrous sulfate, 325 mg, Oral, Daily With Breakfast  hydrALAZINE, 25 mg, Oral, Q8H  insulin lispro, 2-7 Units, Subcutaneous, 4x Daily AC & at Bedtime  lisinopril, 40 mg, Oral, Daily  LORazepam, 0.5 mg, Oral, Nightly  melatonin, 5 mg, Oral, Nightly  povidone-iodine, 1 application , Topical, Daily  senna-docusate sodium, 2 tablet, Oral, BID  sodium chloride, 10 mL, Intravenous, Q12H  thiamine, 100 mg, Oral, TID      Continuous Infusions:     PRN Meds:.  acetaminophen    senna-docusate sodium **AND** polyethylene glycol **AND** bisacodyl **AND** bisacodyl    dextrose    dextrose    glucagon (human recombinant)    hydrALAZINE    hydrOXYzine    LORazepam    ondansetron    [COMPLETED] Insert Peripheral IV **AND** sodium chloride    sodium chloride    sodium chloride    sodium chloride    ziprasidone    Assessment & Plan   Assessment & Plan     Active Hospital Problems    Diagnosis   POA    **AMS (altered mental status) [R41.82]  Yes    S/P transmetatarsal amputation of foot, left [Z89.432]  Not Applicable    Neurocognitive disorder [R41.9]  Yes    Type 2 diabetes mellitus [E11.9]  Yes    Essential hypertension [I10]  Yes    Psychotic disorder [F29]  Yes      Resolved Hospital Problems   No resolved problems to display.        Brief Hospital Course to date:  Omkar Nava is a 67 y.o. male with PMHx significant for dementia w/psychosis, DMII, HTN, polysubstance abuse, osteomyelitis L foot s/p left transmetatarsal amputation 12/2022 who currently resides in nursing home who presented with AMS and fever.     AMS   Fever/Sepsis - concern for CNS infection  - empirically covered for meningitis initially  - LP attempted in the ER as well as IR without success -mostly due to agitation  - infectious work-up including UA, resp PCR, CT abd/pelvis, CT head, CXR unremarkable for clear source of fever  - could consider partially treated cystitis as etiology, however UA is bland  - ID following s/p IV vanc and rocephin changed to PO doxy and cefuroxime 8/7/2023.  Stopped acyclovir.  D/w Dr. Gordon 8/2/23 who believes patient close to baseline and with difficulties with patient cooperating with LP  ok with not attempting again.      Dementia w/Psychosis:  - PRN geodon and ativan for agtitation  - continue home oral regimen when okay to take PO - appears he is on depakote, PRN atarax and nightly scheduled ativan     DMII  - SSI coverage for now     HTN:  - continue home regimen, PRN IV medications      Called and d/w daughter, Idalia Lerma over the phone again on 8/3/2023    Expected Discharge Location and Transportation: patient with LTC bed at [facility] daughter prefers for patient to go somewhere different.  CM following and states that likely need to go back to [facility] and try to change facilities from there  Expected Discharge   Expected Discharge Date: 8/8/2023; Expected Discharge  Time:      DVT prophylaxis:  Medical DVT prophylaxis orders are present.     AM-PAC 6 Clicks Score (PT): 10 (08/04/23 1331)    CODE STATUS:   Code Status and Medical Interventions:   Ordered at: 07/31/23 1801     Level Of Support Discussed With:    Health Care Surrogate     Code Status (Patient has no pulse and is not breathing):    CPR (Attempt to Resuscitate)     Medical Interventions (Patient has pulse or is breathing):    Full Support     Release to patient:    Routine Release       Yash Danielson MD  08/04/23

## 2023-08-04 NOTE — PROGRESS NOTES
[facility]    INFECTIOUS DISEASE PROGRESS NOTE    Omkar Nava  1955  [id number]    Consult: 8/1/23    Admit date: 7/31/2023    Requesting Provider: No ref. provider found  Evaluating physician: Rayray Gordon MD  Reason for Consultation: Fever, sepsis, confusion  Chief Complaint: Above      Subjective   History of present illness:  Patient is a  67 y.o.  Yr old male With a history of diabetes mellitus type 2, peripheral neuropathy, history of alcohol and cocaine and marijuana use, essential hypertension, GERD, psychiatric issues in the past, left foot osteomyelitis status post transmetatarsal amputation December 2022, status post IV antibiotics and oral which completed around January 2023.  Patient was at the nursing home and became increasingly confused and was brought in by daughter and admitted to [facility] on 7/31/2023.  Patient is a poor historian.  He was documented to have a fever of greater than 102.  Urinalysis had hematuria without significant pyuria.  Chest x-ray said possible left lower lobe atelectasis.  CT scan of the brain without acute changes.  CT scan of the abdomen and pelvis with some bladder wall thickening.  I was consulted on 8/1/2023 for further evaluation and treatment.    8/2/2023 history reviewed.  Mental status is down to his baseline which is oftentimes confused.  Tolerating vancomycin, ceftriaxone, and acyclovir but stopping acyclovir today.    8/3/2023 history reviewed.  No high fevers or chills.  Tolerating vancomycin and ceftriaxone.  Cultures have been negative.  No urine culture sent.    8/4/2023 history reviewed.  No fever.  Feeling better.  Ate some [employer] takeout food.  Cultures are negative.  Tolerating cefuroxime and doxycycline until 8/7.    Past Medical History:   Diagnosis Date    Anemia     Dementia     Diabetes mellitus     Dysphagia     GERD (gastroesophageal reflux disease)     History of alcohol abuse      History of cocaine use     History of marijuana use     Hypertension     Osteomyelitis     Poor historian     records obtained from nursing home records & his family    Visual impairment        Past Surgical History:   Procedure Laterality Date    AMPUTATION FOOT Left 10/18/2022    Procedure: PARTIAL FIRST RAY AMPUTATION LEFT;  Surgeon: Yeison Petty MD;  Location: [facility];  Service: Orthopedics;  Laterality: Left;    AMPUTATION FOOT Left 12/5/2022    Procedure: Transmetatarsal of Left Foot;  Surgeon: Yeison Petty MD;  Location: [facility] OR;  Service: Orthopedics;  Laterality: Left;    EYE SURGERY         Pediatric History   Patient Parents    Not on file     Other Topics Concern    Not on file   Social History Narrative    Caffeine 0-1 servings per day    Patient lives at home .   Nursing home resident, no current alcohol or drug use or cigarette use    family history includes Diabetes in his brother, brother, father, maternal grandfather, maternal grandmother, mother, and sister; Hypertension in his brother, brother, father, and mother.    No Known Allergies    Immunization History   Administered Date(s) Administered    COVID-19 (MODERNA) 1st,2nd,3rd Dose Monovalent 06/09/2021, 07/19/2021    COVID-19 (MODERNA) Monovalent Original Booster 04/11/2022       Medication:    Current Facility-Administered Medications:     acetaminophen (TYLENOL) tablet 650 mg, 650 mg, Oral, Q4H PRN, Sarah Gann DO, 650 mg at 08/02/23 1008    apixaban (ELIQUIS) tablet 2.5 mg, 2.5 mg, Oral, Q12H, Yash Danielson MD, 2.5 mg at 08/03/23 2129    sennosides-docusate (PERICOLACE) 8.6-50 MG per tablet 2 tablet, 2 tablet, Oral, BID, 2 tablet at 08/03/23 0940 **AND** polyethylene glycol (MIRALAX) packet 17 g, 17 g, Oral, Daily PRN **AND** bisacodyl (DULCOLAX) EC tablet 5 mg, 5 mg, Oral, Daily PRN **AND** bisacodyl (DULCOLAX) suppository 10 mg, 10 mg, Rectal, Daily PRN, Sarah Gann DO    brimonidine (ALPHAGAN) 0.2 % ophthalmic  solution 1 drop, 1 drop, Both Eyes, TID, 1 drop at 08/03/23 2240 **AND** timolol (TIMOPTIC) 0.5 % ophthalmic solution 1 drop, 1 drop, Both Eyes, BID, Sarah Gann DO, 1 drop at 08/03/23 2241    cefuroxime (CEFTIN) tablet 500 mg, 500 mg, Oral, Q12H, Rayray Gordon MD, 500 mg at 08/03/23 2129    dextrose (D50W) (25 g/50 mL) IV injection 25 g, 25 g, Intravenous, Q15 Min PRN, Sarah Gann DO    dextrose (GLUTOSE) oral gel 15 g, 15 g, Oral, Q15 Min PRN, Sarah Gann DO    divalproex (DEPAKOTE) DR tablet 250 mg, 250 mg, Oral, BID, Sarah Gann DO, 250 mg at 08/03/23 2241    doxycycline (MONODOX) capsule 100 mg, 100 mg, Oral, Q12H, Rayray Gordon MD, 100 mg at 08/03/23 2129    famotidine (PEPCID) tablet 20 mg, 20 mg, Oral, BID, Sarah Gann DO, 20 mg at 08/03/23 2129    ferrous sulfate tablet 325 mg, 325 mg, Oral, Daily With Breakfast, Sarah Gann DO, 325 mg at 08/03/23 0927    glucagon (GLUCAGEN) injection 1 mg, 1 mg, Intramuscular, Q15 Min PRN, Sarah Gann DO    hydrALAZINE (APRESOLINE) injection 10 mg, 10 mg, Intravenous, Q6H PRN, Sarah Gann DO, 10 mg at 08/04/23 0412    hydrALAZINE (APRESOLINE) tablet 25 mg, 25 mg, Oral, Q8H, Sarah Gann DO, 25 mg at 08/03/23 2130    hydrOXYzine (ATARAX) tablet 50 mg, 50 mg, Oral, Q6H PRN, Sarah Gann DO, 50 mg at 08/04/23 0504    Insulin Lispro (humaLOG) injection 2-7 Units, 2-7 Units, Subcutaneous, 4x Daily AC & at Bedtime, Sarah Gann DO, 3 Units at 08/03/23 2129    lisinopril (PRINIVIL,ZESTRIL) tablet 40 mg, 40 mg, Oral, Daily, Sarah Gann DO, 40 mg at 08/03/23 0927    LORazepam (ATIVAN) injection 0.5 mg, 0.5 mg, Intravenous, Q4H PRN, Sarah Gann DO, 0.5 mg at 08/03/23 1645    LORazepam (ATIVAN) tablet 0.5 mg, 0.5 mg, Oral, Nightly, Sarah Gann DO, 0.5 mg at 08/03/23 2130    melatonin tablet 5 mg, 5 mg, Oral, Nightly, Sarah Gann DO, 5 mg at 08/03/23 2130    ondansetron  (ZOFRAN) injection 4 mg, 4 mg, Intravenous, Q6H PRN, Sarah Gann, DO    povidone-iodine 10 % swab 1 each, 1 application , Topical, Daily, Yash Danielson MD, 1 each at 08/03/23 0928    [COMPLETED] Insert Peripheral IV, , , Once **AND** sodium chloride 0.9 % flush 10 mL, 10 mL, Intravenous, PRN, Sarah Gann, DO    sodium chloride 0.9 % flush 10 mL, 10 mL, Intravenous, Q12H, Sarah Gann, DO, 10 mL at 08/03/23 2135    sodium chloride 0.9 % flush 10 mL, 10 mL, Intravenous, PRN, Sarah Gann,     sodium chloride 0.9 % infusion 40 mL, 40 mL, Intravenous, PRN, Sarah Gann, DO    sodium chloride nasal spray 2 spray, 2 spray, Each Nare, PRN, Yash Danielson MD    thiamine (VITAMIN B-1) tablet 100 mg, 100 mg, Oral, TID, Sarah Gann, , 100 mg at 08/03/23 2130    ziprasidone (GEODON) injection 10 mg, 10 mg, Intramuscular, Q4H PRN, Sarah Gann DO    Please refer to the medical record for a full medication list    Review of Systems: Difficult to obtain secondary to mental status.    Constitutional-- No Fever, chills or sweats.  Appetite good, and no malaise. No fatigue.  HEENT-- No new vision, hearing or throat complaints.  No epistaxis or oral sores.  Denies odynophagia or dysphagia.  No odynophagia or dysphagia. No headache, photophobia or neck stiffness.  CV-- No chest pain, palpitation or syncope  Resp-- No SOB/cough/Hemoptysis  GI- No nausea, vomiting, or diarrhea.  No hematochezia, melena, or hematemesis. Denies jaundice or chronic liver disease.  -- No dysuria, hematuria, or flank pain.  Denies hesitancy, urgency or flank pain.  Lymph- no swollen lymph nodes in neck/axilla or groin.   Heme- No active bruising or bleeding; no Hx of DVT or PE.  MS-- no swelling or pain in the bones or joints of arms/legs.  No new back pain.  Neuro-- No acute focal weakness or numbness in the arms or legs.  No seizures.  Skin--No rashes or lesions    Physical Exam:   Vital Signs   Temp:  [96.7  "øF (35.9 øC)-97.8 øF (36.6 øC)] 97.8 øF (36.6 øC)  Heart Rate:  [63-81] 77  Resp:  [18] 18  BP: (148-187)/() 175/100    Temp  Min: 96.7 øF (35.9 øC)  Max: 97.8 øF (36.6 øC)  BP  Min: 148/72  Max: 187/97  Pulse  Min: 63  Max: 81  Resp  Min: 18  Max: 18  SpO2  Min: 91 %  Max: 97 %    Blood pressure 175/100, pulse 77, temperature 97.8 øF (36.6 øC), temperature source Oral, resp. rate 18, height 152.4 cm (60\"), weight 59.4 kg (131 lb), SpO2 95 %.  GENERAL: Awake and less confused, in mild distress. Appears older than stated age.  Resting in bed.  HEENT:  Normocephalic, atraumatic.  Oropharynx without thrush. Dentition in good repair. No cervical adenopathy. No neck masses.  Ears externally normal, Nose externally normal.  EYES: No conjunctival injection. No icterus. EOM full.  LYMPHATICS: No lymphadenopathy of the neck or axillary or inguinal regions.   HEART: No murmur, gallop, or pericardial friction rub. Reg rate rhythm, No JVD at 45 degrees.  LUNGS: Occasional crackles left base. No respiratory distress, no use of accessory muscles.  ABDOMEN: Soft, nontender, nondistended. No appreciable HSM.  Bowel sounds normal.  SKIN: Warm and dry without cutaneous eruptions.  No nodules.  Left foot well-healed transmetatarsal amputation site.  PSYCHIATRIC: Mental status lucid. No confusion.  EXT:  No cellulitic change.  NEURO: Oriented to name, nonfocal    Results Review:   I reviewed the patient's new clinical results.  I reviewed the patient's new imaging results and agree with the interpretation.  I reviewed the patient's other test results and agree with the interpretation    Results from last 7 days   Lab Units 08/03/23  0424 08/02/23  1023 07/31/23  1231   WBC 10*3/mm3 6.41 8.46 11.55*   HEMOGLOBIN g/dL 8.0* 8.9* 9.3*   HEMATOCRIT % 25.1* 27.6* 29.6*   PLATELETS 10*3/mm3 205 236 256       Results from last 7 days   Lab Units 08/03/23  0424   SODIUM mmol/L 140   POTASSIUM mmol/L 4.0   CHLORIDE mmol/L 105   CO2 mmol/L " 25.0   BUN mg/dL 18   CREATININE mg/dL 1.24   GLUCOSE mg/dL 177*   CALCIUM mg/dL 8.1*       Results from last 7 days   Lab Units 08/03/23  0424   ALK PHOS U/L 67   BILIRUBIN mg/dL <0.2   ALT (SGPT) U/L 15   AST (SGOT) U/L 13           Results from last 7 days   Lab Units 08/02/23  1023   CRP mg/dL 0.59*       Results from last 7 days   Lab Units 08/02/23  1023   VANCOMYCIN RM mcg/mL 21.50       Results from last 7 days   Lab Units 08/03/23  0424   LACTATE mmol/L 0.9       Estimated Creatinine Clearance: 49.9 mL/min (by C-G formula based on SCr of 1.24 mg/dL).  CPK          8/2/2023    10:23   Common Labsle   Creatine Kinase 463       Procalitonin Results:      Lab 08/02/23  1023 07/31/23  1231   PROCALCITONIN 0.09 0.05        Brief Urine Lab Results  (Last result in the past 365 days)        Color   Clarity   Blood   Leuk Est   Nitrite   Protein   CREAT   Urine HCG        08/02/23 1319 Yellow   Clear   Negative   Negative   Negative   30 mg/dL (1+)                  No results found for: SITE, ALLENTEST, PHART, XNP0WJK, PO2ART, SHT7RGI, BASEEXCESS, M5BJRVHG, HGBBG, HCTABG, OXYHEMOGLOBI, METHHGBN, CARBOXYHGB, CO2CT, BAROMETRIC, MODALITY, FIO2     Microbiology:  Blood Culture   Date Value Ref Range Status   07/31/2023 No growth at 24 hours  Preliminary   07/31/2023 No growth at 24 hours  Preliminary     No results found for: BCIDPCR, CXREFLEX, CSFCX, CULTURETIS  No results found for: CULTURES, HSVCX, URCX  No results found for: EYECULTURE, GCCX, HSVCULTURE, LABHSV  No results found for: LEGIONELLA, MRSACX, MUMPSCX, MYCOPLASCX  No results found for: NOCARDIACX, STOOLCX  No results found for: THROATCX, UNSTIMCULT, URINECX, CULTURE, VZVCULTUR  No results found for: VIRALCULTU, WOUNDCX     Blood Culture   Date Value Ref Range Status   07/31/2023 No growth at 24 hours  Preliminary   07/31/2023 No growth at 24 hours  Preliminary   (      Radiology:  Imaging Results (Last 72 Hours)       ** No results found for the last 72  hours. **            IMPRESSION:     1.  Sepsis, present on admission, sources could include a cystitis with hematuria but no significant pyuria, less likely aspiration pneumonia left lower lobe versus atelectasis, possible encephalitis not seen on CT scan, and unable to get lumbar puncture secondary to lack of cooperation, versus other.  Usual organisms would include Staphylococcus, gram-negative rods, HSV.  Clinically resolved.  2.  Encephalopathy, toxic and metabolic.  Also positive drug screen for benzodiazepines.  He also has some baseline psychiatric issues.  Clinically improved.  2a.  Doubt encephalitis.  3.  Leukocytosis, neutrophilic related to above issues.  Resolved.  4.  Anemia, chronic disease.  Slightly worse.  5.  Hematuria, related to cystitis versus other.  6.  Diabetes mellitus type 2 with increased risk for infection.  7.  Hypocalcemia 8.1, ongoing.    Plan:    1.  Diagnostically, continue to follow patient's physical exam, CBC, CMP, CRP.  Urine culture ordered and not obtained.  2.  Therapeutically, continue doxycycline and cefuroxime until 8/7/2023  3.  Supportive care.    I discussed the patient's findings and my recommendations with patient and nursing staff    Thank you for asking me to see Omkar Nava.  Our group would be pleased to follow this patient over the course of their hospitalization and assist with outpatient antimicrobial therapy, as indicated.  Further recommendations depend on the results of the cultures and clinical course.  Increased risk for adverse drug reactions, complications of IV access, readmission.  I discussed with the patient's family.  Discussed previously with Dr. Danielson.  See next on Monday, call sooner if needed.    Rayray Gordon MD  8/4/2023

## 2023-08-04 NOTE — CASE MANAGEMENT/SOCIAL WORK
Continued Stay Note  [facility]     Patient Name: Omkar Nava  MRN: [id number]  Today's Date: 8/4/2023    Admit Date: 7/31/2023    Plan: LTC   Discharge Plan       Row Name 08/04/23 1454       Plan    Plan LTC    Patient/Family in Agreement with Plan yes    Plan Comments Patient discharge plan is LTC bed. Patient LTC bed is at [facility]. CM faxed him to other LTC beds here in [place]. Patient will likely need to go back to [facility]. CM faxed clinicals back to [facility]. CM will follow.    Final Discharge Disposition Code 04 - intermediate care facility                   Discharge Codes    No documentation.                 Expected Discharge Date and Time       Expected Discharge Date Expected Discharge Time    Aug 5, 2023               Edie Ochoa RN

## 2023-08-04 NOTE — PLAN OF CARE
Goal Outcome Evaluation:              Outcome Evaluation: Pt more alert but disoriented to situation. Daughter and pt wants to refuse ativan. PRN atarax given instead. NSR on moniror. PRN Hydralazine given. Pt refused AM hydralazine. Will continue plan of care.

## 2023-08-04 NOTE — THERAPY EVALUATION
Patient Name: Omkar Nava  : 1955    MRN: [id number]                              Today's Date: 2023       Admit Date: 2023    Visit Dx:     ICD-10-CM ICD-9-CM   1. Febrile illness  R50.9 780.60   2. 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, without long-term current use of insulin    Orthostatic hypotension    Acute osteomyelitis of left foot    Type 2 diabetes mellitus    Essential hypertension    Psychotic disorder    Neurocognitive disorder    S/P transmetatarsal amputation of foot, left    AMS (altered mental status)     Past Medical History:   Diagnosis Date    Anemia     Dementia     Diabetes mellitus     Dysphagia     GERD (gastroesophageal reflux disease)     History of alcohol abuse     History of cocaine use     History of marijuana use     Hypertension     Osteomyelitis     Poor historian     records obtained from nursing home records & his family    Visual impairment      Past Surgical History:   Procedure Laterality Date    AMPUTATION FOOT Left 10/18/2022    Procedure: PARTIAL FIRST RAY AMPUTATION LEFT;  Surgeon: Yeison Petty MD;  Location:  [facility];  Service: Orthopedics;  Laterality: Left;    AMPUTATION FOOT Left 2022    Procedure: Transmetatarsal of Left Foot;  Surgeon: Yeison Petty MD;  Location:  [facility];  Service: Orthopedics;  Laterality: Left;    EYE SURGERY        General Information       Row Name 23 1220          OT Time and Intention    Document Type evaluation  -CS     Mode of Treatment occupational therapy  -CS       Row Name 23 1220          General Information    Patient Profile Reviewed yes  -CS     Prior Level of Function --  Pt limited historian, TBA further  -CS     Existing Precautions/Restrictions  fall;other (see comments)  dementia, h/o L transmetatarsal amputation  -CS     Barriers to Rehab medically complex;previous functional deficit;cognitive status  -CS       Row Name 08/04/23 1220          Living Environment    People in Home facility resident  LTC  -CS       Row Name 08/04/23 1220          Cognition    Orientation Status (Cognition) oriented to;person;disoriented to;place;situation;time  -CS       Row Name 08/04/23 1220          Safety Issues, Functional Mobility    Impairments Affecting Function (Mobility) balance;cognition;strength;motor planning;postural/trunk control;coordination;range of motion (ROM);sensation/sensory awareness  -CS     Cognitive Impairments, Mobility Safety/Performance attention;insight into deficits/self-awareness;sequencing abilities;impulsivity;awareness, need for assistance;safety precaution awareness;safety precaution follow-through;judgment;problem-solving/reasoning  -CS               User Key  (r) = Recorded By, (t) = Taken By, (c) = Cosigned By      Initials Name Provider Type    CS Sarah Jade OT Occupational Therapist                     Mobility/ADL's       Row Name 08/04/23 1222          Bed Mobility    Bed Mobility rolling left;rolling right;supine-sit  -CS     Rolling Left [place] (Bed Mobility) moderate assist (50% patient effort);2 person assist;verbal cues  -CS     Rolling Right [place] (Bed Mobility) moderate assist (50% patient effort);verbal cues  -CS     Supine-Sit [place] (Bed Mobility) moderate assist (50% patient effort);verbal cues  -CS     Assistive Device (Bed Mobility) bed rails;draw sheet  -       Row Name 08/04/23 1222          Transfers    Transfers sit-stand transfer;stand-sit transfer  -       Row Name 08/04/23 1222          Sit-Stand Transfer    Sit-Stand [place] (Transfers) moderate assist (50% patient effort);2 person assist;verbal cues  -       Row Name 08/04/23 1222          Stand-Sit Transfer    Stand-Sit  [place] (Transfers) moderate assist (50% patient effort);2 person assist;verbal cues  -CS       Row Name 08/04/23 1222          Activities of Daily Living    BADL Assessment/Intervention lower body dressing;feeding;toileting;upper body dressing  -CS       Row Name 08/04/23 1222          Lower Body Dressing Assessment/Training    [place] Level (Lower Body Dressing) doff;don;maximum assist (25% patient effort)  -CS     Position (Lower Body Dressing) supine  -CS     Comment, (Lower Body Dressing) hosp gown  -CS       Row Name 08/04/23 1222          Self-Feeding Assessment/Training    [place] Level (Feeding) liquids to mouth;set up  -CS     Position (Self-Feeding) sitting up in bed  -CS       Row Name 08/04/23 1222          Toileting Assessment/Training    [place] Level (Toileting) adjust/manage clothing;change pad/brief;perform perineal hygiene;dependent (less than 25% patient effort)  -CS     Position (Toileting) supine  -CS       Row Name 08/04/23 1222          Upper Body Dressing Assessment/Training    [place] Level (Upper Body Dressing) doff;don;moderate assist (50% patient effort)  -CS     Position (Upper Body Dressing) sitting up in bed  -CS     Comment, (Upper Body Dressing) hosp gown  -CS               User Key  (r) = Recorded By, (t) = Taken By, (c) = Cosigned By      Initials Name Provider Type    Sarah Renae OT Occupational Therapist                   Obj/Interventions       Row Name 08/04/23 1223          Sensory Assessment (Somatosensory)    Sensory Assessment (Somatosensory) unable/difficult to assess  -       Row Name 08/04/23 1223          Vision Assessment/Intervention    Visual Impairment/Limitations unable/difficult to assess  -       Row Name 08/04/23 1223          Range of Motion Comprehensive    General Range of Motion bilateral upper extremity ROM WFL  -CS       Row Name 08/04/23 1223          Strength Comprehensive (MMT)    Comment, General Manual Muscle  Testing (MMT) Assessment BUE observed grossly 4-/5  -CS       Row Name 08/04/23 1223          Balance    Balance Assessment sitting static balance;sitting dynamic balance;standing static balance;standing dynamic balance  -CS     Static Sitting Balance contact guard  -CS     Dynamic Sitting Balance minimal assist  -CS     Position, Sitting Balance sitting edge of bed  -CS     Static Standing Balance moderate assist;2-person assist  -CS     Dynamic Standing Balance moderate assist;2-person assist  -CS     Position/Device Used, Standing Balance supported  -CS     Balance Interventions sitting;standing;static;dynamic;dynamic reaching;occupation based/functional task;weight shifting activity  -CS               User Key  (r) = Recorded By, (t) = Taken By, (c) = Cosigned By      Initials Name Provider Type    CS Sarah Jade, OT Occupational Therapist                   Goals/Plan       Row Name 08/04/23 1228          Transfer Goal 1 (OT)    Activity/Assistive Device (Transfer Goal 1, OT) sit-to-stand/stand-to-sit;toilet  -CS     [place] Level/Cues Needed (Transfer Goal 1, OT) minimum assist (75% or more patient effort)  -CS     Time Frame (Transfer Goal 1, OT) long term goal (LTG);10 days  -CS     Progress/Outcome (Transfer Goal 1, OT) goal ongoing  -       Row Name 08/04/23 1228          Grooming Goal 1 (OT)    Activity/Device (Grooming Goal 1, OT) oral care;wash face, hands  -CS     [place] (Grooming Goal 1, OT) standby assist  -CS     Time Frame (Grooming Goal 1, OT) long term goal (LTG);10 days  -CS     Strategies/Barriers (Grooming Goal 1, OT) unsupported sitting  -CS     Progress/Outcome (Grooming Goal 1, OT) goal ongoing  -CS       Row Name 08/04/23 1228          Strength Goal 1 (OT)    Strength Goal 1 (OT) Pt will tolerate BUE HEP w/ red thera-band x15 reps.  -CS     Time Frame (Strength Goal 1, OT) long term goal (LTG);10 days  -CS     Progress/Outcome (Strength Goal 1, OT) goal ongoing  -CS        Row Name 08/04/23 1228          Therapy Assessment/Plan (OT)    Planned Therapy Interventions (OT) activity tolerance training;adaptive equipment training;BADL retraining;functional balance retraining;occupation/activity based interventions;ROM/therapeutic exercise;strengthening exercise;transfer/mobility retraining  -               User Key  (r) = Recorded By, (t) = Taken By, (c) = Cosigned By      Initials Name Provider Type    CS Sarah Jade OT Occupational Therapist                   Clinical Impression       Row Name 08/04/23 1223          Pain Assessment    Additional Documentation Pain Scale: FACES Pre/Post-Treatment (Group)  -[facility] Name 08/04/23 1223          Pain Scale: FACES Pre/Post-Treatment    Pain: FACES Scale, Pretreatment 0-->no hurt  -CS     Posttreatment Pain Rating 0-->no hurt  -CS       Row Name 08/04/23 1223          Plan of Care Review    Plan of Care Reviewed With patient  -CS     Progress improving  -     Outcome Evaluation OT eval cpmplete. Pt presents w/ cognitive deficits, decreased safety awareness, balance deficits, and generalized weakness limiting functional independence from baseline. Recommend cont skilled IPOT POC. Recommend pt DC to SNF.  -       Row Name 08/04/23 1223          Therapy Assessment/Plan (OT)    Patient/Family Therapy Goal Statement (OT) Return to PLOF  -CS     Rehab Potential (OT) good, to achieve stated therapy goals  -     Criteria for Skilled Therapeutic Interventions Met (OT) yes;skilled treatment is necessary  -     Therapy Frequency (OT) daily  -       Row Name 08/04/23 1223          Therapy Plan Review/Discharge Plan (OT)    Anticipated Discharge Disposition (OT) skilled nursing facility  -       Row Name 08/04/23 1223          Vital Signs    Pre Systolic BP Rehab --  VSS  -CS     O2 Delivery Pre Treatment room air  -CS     O2 Delivery Intra Treatment room air  -CS     O2 Delivery Post Treatment room air  -CS     Pre Patient Position  Supine  -CS     Intra Patient Position Standing  -CS     Post Patient Position Sitting  -CS       Row Name 08/04/23 1223          Positioning and Restraints    Pre-Treatment Position in bed  -CS     Post Treatment Position bed  -CS     In Bed notified nsg;sitting EOB;with PT;call light within reach;encouraged to call for assist  -CS               User Key  (r) = Recorded By, (t) = Taken By, (c) = Cosigned By      Initials Name Provider Type    Sarah Renae OT Occupational Therapist                   Outcome Measures       Row Name 08/04/23 1229          How much help from another is currently needed...    Putting on and taking off regular lower body clothing? 1  -CS     Bathing (including washing, rinsing, and drying) 2  -CS     Toileting (which includes using toilet bed pan or urinal) 1  -CS     Putting on and taking off regular upper body clothing 2  -CS     Taking care of personal grooming (such as brushing teeth) 2  -CS     Eating meals 3  -CS     AM-PAC 6 Clicks Score (OT) 11  -CS       Row Name 08/04/23 1229          Functional Assessment    Outcome Measure Options AM-PAC 6 Clicks Daily Activity (OT)  -CS               User Key  (r) = Recorded By, (t) = Taken By, (c) = Cosigned By      Initials Name Provider Type    Sarah Renae OT Occupational Therapist                    Occupational Therapy Education       Title: PT OT SLP Therapies (In Progress)       Topic: Occupational Therapy (In Progress)       Point: ADL training (In Progress)       Description:   Instruct learner(s) on proper safety adaptation and remediation techniques during self care or transfers.   Instruct in proper use of assistive devices.                  Learning Progress Summary             Patient Acceptance, E, NR by CS at 8/4/2023 1229                         Point: Home exercise program (Not Started)       Description:   Instruct learner(s) on appropriate technique for monitoring, assisting and/or progressing therapeutic  exercises/activities.                  Learner Progress:  Not documented in this visit.              Point: Precautions (In Progress)       Description:   Instruct learner(s) on prescribed precautions during self-care and functional transfers.                  Learning Progress Summary             Patient Acceptance, E, NR by  at 8/4/2023 1220                         Point: Body mechanics (In Progress)       Description:   Instruct learner(s) on proper positioning and spine alignment during self-care, functional mobility activities and/or exercises.                  Learning Progress Summary             Patient Acceptance, E, NR by  at 8/4/2023 1229                                         User Key       Initials Effective Dates Name Provider Type Discipline     09/02/21 -  Sarah Jade, SHIR[facility] Occupational Therapist OT                  OT Recommendation and Plan  Planned Therapy Interventions (OT): activity tolerance training, adaptive equipment training, BADL retraining, functional balance retraining, occupation/activity based interventions, ROM/therapeutic exercise, strengthening exercise, transfer/mobility retraining  Therapy Frequency (OT): daily  Plan of Care Review  Plan of Care Reviewed With: patient  Progress: improving  Outcome Evaluation: OT eval cpmplete. Pt presents w/ cognitive deficits, decreased safety awareness, balance deficits, and generalized weakness limiting functional independence from baseline. Recommend cont skilled IPOT POC. Recommend pt DC to SNF.     Time Calculation:   Evaluation Complexity (OT)  Review Occupational Profile/Medical/Therapy History Complexity: brief/low complexity  Assessment, Occupational Performance/Identification of Deficit Complexity: 3-5 performance deficits  Clinical Decision Making Complexity (OT): problem focused assessment/low complexity  Overall Complexity of Evaluation (OT): low complexity     Time Calculation- OT       Row Name 08/04/23 6203              Time Calculation- OT    OT Start Time 0850  -CS      OT Received On 08/04/23  -CS      OT Goal Re-Cert Due Date 08/14/23  -CS         Untimed Charges    OT Eval/Re-eval Minutes 46  -CS         Total Minutes    Untimed Charges Total Minutes 46  -CS       Total Minutes 46  -CS                User Key  (r) = Recorded By, (t) = Taken By, (c) = Cosigned By      Initials Name Provider Type    CS Sarah Jade OT Occupational Therapist                  Therapy Charges for Today       Code Description Service Date Service Provider Modifiers Qty    [id number] HC OT EVAL LOW COMPLEXITY 4 8/4/2023 Sarah Jade OT GO 1                 Sarah Jade OT  8/4/2023

## 2023-08-04 NOTE — THERAPY EVALUATION
Patient Name: Omkar Nava  : 1955    MRN: [id number]                              Today's Date: 2023       Admit Date: 2023    Visit Dx:     ICD-10-CM ICD-9-CM   1. Febrile illness  R50.9 780.60   2. 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, without long-term current use of insulin    Orthostatic hypotension    Acute osteomyelitis of left foot    Type 2 diabetes mellitus    Essential hypertension    Psychotic disorder    Neurocognitive disorder    S/P transmetatarsal amputation of foot, left    AMS (altered mental status)     Past Medical History:   Diagnosis Date    Anemia     Dementia     Diabetes mellitus     Dysphagia     GERD (gastroesophageal reflux disease)     History of alcohol abuse     History of cocaine use     History of marijuana use     Hypertension     Osteomyelitis     Poor historian     records obtained from nursing home records & his family    Visual impairment      Past Surgical History:   Procedure Laterality Date    AMPUTATION FOOT Left 10/18/2022    Procedure: PARTIAL FIRST RAY AMPUTATION LEFT;  Surgeon: Yeison Petty MD;  Location:  [facility];  Service: Orthopedics;  Laterality: Left;    AMPUTATION FOOT Left 2022    Procedure: Transmetatarsal of Left Foot;  Surgeon: Yeison Petty MD;  Location:  [facility];  Service: Orthopedics;  Laterality: Left;    EYE SURGERY        General Information       Row Name 23 1324          Physical Therapy Time and Intention    Document Type evaluation  -KG     Mode of Treatment physical therapy  -KG       Row Name 23 1324          General Information    Patient Profile Reviewed yes  -KG     Prior Level of Function --  pt is limited historian; TBA later  -KG     Existing  Precautions/Restrictions fall;other (see comments)  dementia; remote L transmetatarsal amputation  -KG     Barriers to Rehab medically complex;previous functional deficit;cognitive status  -KG       Row Name 08/04/23 1324          Living Environment    People in Home facility resident  -KG       Row Name 08/04/23 1324          Home Main Entrance    Number of Stairs, Main Entrance none  -KG       Row Name 08/04/23 1324          Stairs Within Home, Primary    Number of Stairs, Within Home, Primary none  -KG       Row Name 08/04/23 1324          Cognition    Orientation Status (Cognition) oriented to;person  -KG       Row Name 08/04/23 1324          Safety Issues, Functional Mobility    Safety Issues Affecting Function (Mobility) ability to follow commands;awareness of need for assistance;insight into deficits/self-awareness;safety precaution awareness;safety precautions follow-through/compliance;sequencing abilities  -KG     Impairments Affecting Function (Mobility) balance;cognition;coordination;endurance/activity tolerance;postural/trunk control;strength  -KG     Cognitive Impairments, Mobility Safety/Performance attention;awareness, need for assistance;insight into deficits/self-awareness;safety precaution awareness;safety precaution follow-through;sequencing abilities  -KG               User Key  (r) = Recorded By, (t) = Taken By, (c) = Cosigned By      Initials Name Provider Type    KG Micaela Kaplan, PT Physical Therapist                   Mobility       Row Name 08/04/23 1325          Bed Mobility    Bed Mobility scooting/bridging;sit-supine  -KG     Scooting/Bridging [place] (Bed Mobility) maximum assist (25% patient effort);2 person assist;verbal cues  -KG     Sit-Supine [place] (Bed Mobility) moderate assist (50% patient effort);verbal cues  -KG     Assistive Device (Bed Mobility) bed rails;draw sheet;head of bed elevated  -KG     Comment, (Bed Mobility) VC's for sequencing and technique. Pt  required assistance at trunk and BLEs.  -KG       Row Name 08/04/23 1325          Transfers    Comment, (Transfers) STS x1 from EOB. VC's for sequencing and technique. Pt required increased time and effort to complete. Unable to achieve full upright posture. Pt declined additional mobility despite encouragement.  -KG       [facility] Name 08/04/23 1325          Sit-Stand Transfer    Sit-Stand [place] (Transfers) moderate assist (50% patient effort);2 person assist;verbal cues  -KG     Assistive Device (Sit-Stand Transfers) other (see comments)  B UE support  -KG       Row Name 08/04/23 1325          Gait/Stairs (Locomotion)    [place] Level (Gait) unable to assess  -KG     Comment, (Gait/Stairs) Ambulation deferred. Not appropriate to assess.  -KG               User Key  (r) = Recorded By, (t) = Taken By, (c) = Cosigned By      Initials Name Provider Type    KG Micaela Kaplan, PT Physical Therapist                   Obj/Interventions       [facility] Name 08/04/23 1327          Range of Motion Comprehensive    General Range of Motion no range of motion deficits identified  -KG     Comment, General Range of Motion B LE WFL  -KG       [facility] Name 08/04/23 1327          Strength Comprehensive (MMT)    Comment, General Manual Muscle Testing (MMT) Assessment B LE grossly 3+/5  -KG       Row Name 08/04/23 1327          Balance    Balance Assessment sitting static balance;standing static balance;standing dynamic balance  -KG     Static Sitting Balance contact guard  -KG     Position, Sitting Balance unsupported;sitting edge of bed  -KG     Static Standing Balance moderate assist;2-person assist  -KG     Dynamic Standing Balance moderate assist;2-person assist  -KG     Position/Device Used, Standing Balance supported  -KG       [facility] Name 08/04/23 1327          Sensory Assessment (Somatosensory)    Sensory Assessment (Somatosensory) unable/difficult to assess  -KG               User Key  (r) = Recorded By, (t) = Taken By, (c)  = Cosigned By      Initials Name Provider Type    KG Micaela Kaplan, PT Physical Therapist                   Goals/Plan       Row Name 08/04/23 1331          Bed Mobility Goal 1 (PT)    Activity/Assistive Device (Bed Mobility Goal 1, PT) sit to supine;supine to sit  -KG     [place] Level/Cues Needed (Bed Mobility Goal 1, PT) contact guard required  -KG     Time Frame (Bed Mobility Goal 1, PT) 2 weeks  -KG     Progress/Outcomes (Bed Mobility Goal 1, PT) goal ongoing  -KG       Row Name 08/04/23 1331          Transfer Goal 1 (PT)    Activity/Assistive Device (Transfer Goal 1, PT) sit-to-stand/stand-to-sit;bed-to-chair/chair-to-bed  -KG     [place] Level/Cues Needed (Transfer Goal 1, PT) minimum assist (75% or more patient effort)  -KG     Time Frame (Transfer Goal 1, PT) 2 weeks  -KG     Progress/Outcome (Transfer Goal 1, PT) goal ongoing  -KG       Row Name 08/04/23 1331          Gait Training Goal 1 (PT)    Activity/Assistive Device (Gait Training Goal 1, PT) gait (walking locomotion);assistive device use;walker, rolling  -KG     [place] Level (Gait Training Goal 1, PT) moderate assist (50-74% patient effort)  -KG     Distance (Gait Training Goal 1, PT) 15 feet  -KG     Time Frame (Gait Training Goal 1, PT) 2 weeks  -KG     Progress/Outcome (Gait Training Goal 1, PT) goal ongoing  -KG       Row Name 08/04/23 1331          Therapy Assessment/Plan (PT)    Planned Therapy Interventions (PT) balance training;bed mobility training;gait training;strengthening;transfer training  -KG               User Key  (r) = Recorded By, (t) = Taken By, (c) = Cosigned By      Initials Name Provider Type    KG Micaela Kaplan N, PT Physical Therapist                   Clinical Impression       Row Name 08/04/23 1328          Pain    Additional Documentation Pain Scale: FACES Pre/Post-Treatment (Group)  -KG       Row Name 08/04/23 1328          Pain Scale: FACES Pre/Post-Treatment    Pain: FACES Scale,  Pretreatment 0-->no hurt  -KG     Posttreatment Pain Rating 0-->no hurt  -KG       Row Name 08/04/23 1328          Plan of Care Review    Plan of Care Reviewed With patient  -KG     Outcome Evaluation PT initial evaluation completed for pt presenting with generalized weakness, confusion, impaired balance and coordination, decreased safety awareness, and decreased functional mobility. Pt required modA x2 for STS transfers. Pt's decreased independence warrants PT skilled care. Recommend D/C to SNF.  -KG       Row Name 08/04/23 1328          Therapy Assessment/Plan (PT)    Patient/Family Therapy Goals Statement (PT) pt unable to state  -KG     Rehab Potential (PT) fair, will monitor progress closely  -KG     Criteria for Skilled Interventions Met (PT) yes;skilled treatment is necessary  -KG     Therapy Frequency (PT) daily  -KG       Row Name 08/04/23 1328          Vital Signs    Pre Systolic BP Rehab 178  -KG     Pre Treatment Diastolic BP 91  -KG     Pretreatment Heart Rate (beats/min) 77  -KG     Posttreatment Heart Rate (beats/min) 70  -KG     O2 Delivery Pre Treatment room air  -KG     O2 Delivery Post Treatment room air  -KG     Pre Patient Position Sitting  -KG     Intra Patient Position Standing  -KG     Post Patient Position Supine  -KG       Row Name 08/04/23 1328          Positioning and Restraints    Pre-Treatment Position in bed  -KG     Post Treatment Position bed  -KG     In Bed notified nsg;supine;call light within reach;encouraged to call for assist;exit alarm on;side rails up x3  -KG               User Key  (r) = Recorded By, (t) = Taken By, (c) = Cosigned By      Initials Name Provider Type    KG Micaela Kaplan, PT Physical Therapist                   Outcome Measures       Row Name 08/04/23 1331          How much help from another person do you currently need...    Turning from your back to your side while in flat bed without using bedrails? 2  -KG     Moving from lying on back to sitting on  the side of a flat bed without bedrails? 2  -KG     Moving to and from a bed to a chair (including a wheelchair)? 2  -KG     Standing up from a chair using your arms (e.g., wheelchair, bedside chair)? 2  -KG     Climbing 3-5 steps with a railing? 1  -KG     To walk in hospital room? 1  -KG     AM-PAC 6 Clicks Score (PT) 10  -KG     Highest level of mobility 4 --> Transferred to chair/commode  -KG       Row Name 08/04/23 1331 08/04/23 1229       Functional Assessment    Outcome Measure Options AM-PAC 6 Clicks Basic Mobility (PT)  -KG AM-PAC 6 Clicks Daily Activity (OT)  -CS              User Key  (r) = Recorded By, (t) = Taken By, (c) = Cosigned By      Initials Name Provider Type    CS Sarah Jade OT Occupational Therapist    KG Micaela Kaplan, PT Physical Therapist                                 Physical Therapy Education       Title: PT OT SLP Therapies (In Progress)       Topic: Physical Therapy (In Progress)       Point: Mobility training (In Progress)       Learning Progress Summary             Patient Acceptance, E, NR by KG at 8/4/2023 0911                         Point: Home exercise program (Not Started)       Learner Progress:  Not documented in this visit.              Point: Body mechanics (In Progress)       Learning Progress Summary             Patient Acceptance, E, NR by KG at 8/4/2023 0911                         Point: Precautions (In Progress)       Learning Progress Summary             Patient Acceptance, E, NR by KG at 8/4/2023 0911                                         User Key       Initials Effective Dates Name Provider Type Discipline     05/22/20 -  Micaela Kaplan, PT Physical Therapist PT                  PT Recommendation and Plan  Planned Therapy Interventions (PT): balance training, bed mobility training, gait training, strengthening, transfer training  Plan of Care Reviewed With: patient  Outcome Evaluation: PT initial evaluation completed for pt presenting with  generalized weakness, confusion, impaired balance and coordination, decreased safety awareness, and decreased functional mobility. Pt required modA x2 for STS transfers. Pt's decreased independence warrants PT skilled care. Recommend D/C to SNF.     Time Calculation:   PT Evaluation Complexity  History, PT Evaluation Complexity: 3 or more personal factors and/or comorbidities  Examination of Body Systems (PT Eval Complexity): total of 3 or more elements  Clinical Presentation (PT Evaluation Complexity): evolving  Clinical Decision Making (PT Evaluation Complexity): moderate complexity  Overall Complexity (PT Evaluation Complexity): moderate complexity     PT Charges       Row Name 08/04/23 0911             Time Calculation    Start Time 0911  -KG      PT Received On 08/04/23  -KG      PT Goal Re-Cert Due Date 08/14/23  -KG         Untimed Charges    PT Eval/Re-eval Minutes 51  -KG         Total Minutes    Untimed Charges Total Minutes 51  -KG       Total Minutes 51  -KG                User Key  (r) = Recorded By, (t) = Taken By, (c) = Cosigned By      Initials Name Provider Type    KG Micaela Kaplan, PT Physical Therapist                  Therapy Charges for Today       Code Description Service Date Service Provider Modifiers Qty    [id number] HC PT EVAL MOD COMPLEXITY 4 8/4/2023 Micaela Kaplan, PT GP 1            PT G-Codes  Outcome Measure Options: AM-PAC 6 Clicks Basic Mobility (PT)  AM-PAC 6 Clicks Score (PT): 10  AM-PAC 6 Clicks Score (OT): 11  PT Discharge Summary  Anticipated Discharge Disposition (PT): skilled nursing facility    Lisette Kaplan PT  8/4/2023

## 2023-08-04 NOTE — PLAN OF CARE
Problem: Adult Inpatient Plan of Care  Goal: Plan of Care Review  Flowsheets (Taken 8/4/2023 3839)  Progress: improving  Plan of Care Reviewed With: patient  Outcome Evaluation: Patient more alert and oriented to self and place. Patient denies pain. BPs elevated this shift, scheduled and PRN antihypertensives given and BPs remain elevated with systolic in 170s. Patient remains on RA. Will continue with POC.   Goal Outcome Evaluation:  Plan of Care Reviewed With: patient        Progress: improving  Outcome Evaluation: Patient more alert and oriented to self and place. Patient denies pain. BPs elevated this shift, scheduled and PRN antihypertensives given and BPs remain elevated with systolic in 170s. Patient remains on RA. Will continue with POC.

## 2023-08-04 NOTE — DISCHARGE PLACEMENT REQUEST
"Prema Sung (67 y.o. Male)       Date of Birth   1955    Social Security Number       Address   [address] [place] [address]    Home Phone   [phone]    MRN   [id number]       Latter day   None    Marital Status   Single                            Admission Date   7/31/23    Admission Type   Emergency    Admitting Provider   Yash Danielson MD    Attending Provider   Ysah Danielson MD    Department, Room/Bed   [facility] 6B, N639/1       Discharge Date       Discharge Disposition       Discharge Destination                                 Attending Provider: Yash Danielson MD    Allergies: No Known Allergies    Isolation: None   Infection: MRSA (07/31/23)   Code Status: CPR    Ht: 152.4 cm (60\")   Wt: 59.4 kg (131 lb)    Admission Cmt: None   Principal Problem: AMS (altered mental status) [R41.82]                   Active Insurance as of 7/31/2023       Primary Coverage       Payor Plan Insurance Group Employer/Plan Group    MEDICARE MEDICARE A & B        Payor Plan Address Payor Plan Phone Number Payor Plan Fax Number Effective Dates    [address] [phone]  8/1/2022 - None Entered    [facility] [id number]         Subscriber Name Subscriber Birth Date Member ID       PREMA SUNG 1955 [id number]               Secondary Coverage       Payor Plan Insurance Group Employer/Plan Group    [place]        Payor Plan Address Payor Plan Phone Number Payor Plan Fax Number Effective Dates    [address] [phone]  3/6/2023 - None Entered    [place] [facility] [place] [address]         Subscriber Name Subscriber Birth Date Member ID       ANA LILIAPREMA RALF 1955 [id number]                     Emergency Contacts        (Rel.) Home Phone Work Phone Mobile Phone    Idalia Lerma (Daughter) -- -- [phone]    CliffordThang (Sister) [phone] -- --                 History & Physical        Sarah Gann DO at 07/31/23 1844        "       [facility] Medicine Services  HISTORY AND PHYSICAL    Patient Name: Omkar Nava  : 1955  MRN: [id number]  Primary Care Physician: Deann Cao MD  Date of admission: 2023      Subjective  Subjective     Chief Complaint:  AMS    HPI:  Omkar Nava is a 67 y.o. male with PMHx significant for dementia w/psychosis, DMII, HTN, polysubstance abuse, osteomyelitis L foot s/p left transmetatarsal amputation 2022 who currently resides in nursing home. Daughter brought patient into the ED due to change in mental status. She reports patient has been doing really well lately, has been pleasant, conversant. At baseline he is able to recognize her, he knows the day/month, place ect. This week he developed UTI type symptoms and was put on a short course of ciprofloxacin about 3 days ago, however daughter states she was concerned as his mental status and fever continued to worsen. She states he complained of his head hurting, denied neck pain. On arrival to the ED he was febrile to 103. His work-up including CT scan were unremarkable with no clear source. LP was attempted in both the ED and IR, however was unsuccessful due to agitation, despite sedating medications.     Review of Systems   UTO, secondary to mental status    Personal History     Past Medical History:   Diagnosis Date    Anemia     Dementia     Diabetes mellitus     Dysphagia     GERD (gastroesophageal reflux disease)     History of alcohol abuse     History of cocaine use     History of marijuana use     Hypertension     Osteomyelitis     Poor historian     records obtained from nursing home records & his family    Visual impairment              Past Surgical History:   Procedure Laterality Date    AMPUTATION FOOT Left 10/18/2022    Procedure: PARTIAL FIRST RAY AMPUTATION LEFT;  Surgeon: Yeison Petty MD;  Location: [facility];  Service: Orthopedics;  Laterality: Left;    AMPUTATION FOOT Left 2022     "Procedure: Transmetatarsal of Left Foot;  Surgeon: Yeison Petty MD;  Location: [facility];  Service: Orthopedics;  Laterality: Left;    EYE SURGERY         Family History: family history includes Diabetes in his brother, brother, father, maternal grandfather, maternal grandmother, mother, and sister; Hypertension in his brother, brother, father, and mother.     Social History:  reports that he quit smoking about 6 years ago. His smoking use included cigarettes. He has a 40.00 pack-year smoking history. He has never used smokeless tobacco. He reports that he does not currently use alcohol. He reports current drug use. Drugs: Marijuana and \"Crack\" cocaine.  Social History     Social History Narrative    Caffeine 0-1 servings per day    Patient lives at home .       Medications:  Available home medication information reviewed.  Medications Prior to Admission   Medication Sig Dispense Refill Last Dose    acetaminophen (TYLENOL) 325 MG tablet Take 2 tablets by mouth Every 6 (Six) Hours As Needed for Mild Pain, Headache or Fever.   7/30/2023    albuterol sulfate  (90 Base) MCG/ACT inhaler Inhale 2 puffs Every 4 (Four) Hours As Needed for Wheezing or Shortness of Air.   Past Month    apixaban (ELIQUIS) 2.5 MG tablet tablet Take 1 tablet by mouth 2 (Two) Times a Day. VTE ppx   7/31/2023    atorvastatin (LIPITOR) 20 MG tablet Take 1 tablet by mouth Every Night.   7/30/2023    brimonidine-timolol (COMBIGAN) 0.2-0.5 % ophthalmic solution Administer 1 drop to both eyes 2 (Two) Times a Day.   7/30/2023    Cholecalciferol 50 MCG (2000 UT) capsule Take 3 capsules by mouth Every Morning.   7/30/2023    difluprednate (DUREZOL) 0.05 % ophthalmic emulsion Administer 1 drop into the left eye 4 (Four) Times a Day.   7/30/2023    divalproex (DEPAKOTE) 125 MG DR tablet Take 2 tablets by mouth 2 (Two) Times a Day.   7/30/2023    Emollient (Aquaphilic) ointment Apply 1 application  topically to the appropriate area as directed 2 " (Two) Times a Day. Apply to scalp, lower back, arms, legs, for dry skin   7/30/2023    famotidine (PEPCID) 20 MG tablet Take 1 tablet by mouth 2 (Two) Times a Day.   7/30/2023    ferrous sulfate 325 (65 FE) MG tablet Take 1 tablet by mouth Daily With Breakfast.   7/30/2023    furosemide (LASIX) 20 MG tablet Take 2 tablets by mouth Daily.   7/31/2023    hydrALAZINE (APRESOLINE) 25 MG tablet Take 1 tablet by mouth Every 8 (Eight) Hours. 90 tablet 0 7/30/2023    hydrOXYzine (ATARAX) 50 MG tablet Take 1 tablet by mouth Every 6 (Six) Hours As Needed for Anxiety.   Past Week    insulin glargine (LANTUS, SEMGLEE) 100 UNIT/ML injection Inject 6 Units under the skin into the appropriate area as directed Every Night.   7/30/2023    lisinopril (PRINIVIL,ZESTRIL) 40 MG tablet Take 1 tablet by mouth Daily.   7/31/2023    LORazepam (ATIVAN) 0.5 MG tablet Take 1 tablet by mouth every night at bedtime.   7/30/2023    melatonin 5 MG tablet tablet Take 1 tablet by mouth Every Night.   7/30/2023    metFORMIN (GLUCOPHAGE) 1000 MG tablet Take 1 tablet by mouth 2 (Two) Times a Day With Meals. (Patient taking differently: Take 0.5 tablets by mouth 3 (Three) Times a Day With Meals.)   7/30/2023    thiamine (VITAMIN B-1) 100 MG tablet  tablet Take 1 tablet by mouth 3 (Three) Times a Day.   7/30/2023    cloNIDine (CATAPRES) 0.1 MG tablet Take 1 tablet by mouth Every 6 (Six) Hours As Needed for High Blood Pressure. SBP >160   Unknown at [facility]    Glucagon HCl 1 MG reconstituted solution Inject 1 mg into the appropriate muscle as directed by prescriber As Needed.   Unknown    Nepafenac 0.3 % suspension Administer 1 drop into the left eye every night at bedtime. Start on 8-9-23 for 2 days   Unknown       No Known Allergies    Objective  Objective     Vital Signs:   Temp:  [98.4 øF (36.9 øC)-103.6 øF (39.8 øC)] 99 øF (37.2 øC)  Heart Rate:  [] 89  Resp:  [18-20] 20  BP: (141-176)/() 141/72  Flow (L/min):  [2] 2       Physical Exam    Constitutional: Awake, alert  Eyes: PERRLA, sclerae anicteric  HENT: NCAT  Neck: Supple, no thyromegaly, no lymphadenopathy, trachea midline  Respiratory: Clear to auscultation bilaterally, nonlabored respirations   Cardiovascular: RRR, no murmurs, rubs, or gallops, palpable pedal pulses bilaterally  Gastrointestinal: soft, nontender, nondistended  Musculoskeletal: No bilateral ankle edema  Psychiatric: Appropriate affect, cooperative  Neurologic: confused, UTO strength assessment  Skin: No rashes      Result Review:  I have personally reviewed the results from the time of this admission to 7/31/2023 18:44 EDT and agree with these findings:  [x]  Laboratory list / accordion  [x]  Microbiology  [x]  Radiology  [x]  EKG/Telemetry   [x]  Cardiology/Vascular   [x]  Pathology  []  Old records  []  Other:  Most notable findings include:         LAB RESULTS:      Lab 07/31/23  1231   WBC 11.55*   HEMOGLOBIN 9.3*   HEMATOCRIT 29.6*   PLATELETS 256   NEUTROS ABS 9.17*   IMMATURE GRANS (ABS) 0.06*   LYMPHS ABS 1.73   MONOS ABS 0.55   EOS ABS 0.01   MCV 88.9   PROCALCITONIN 0.05   LACTATE 1.1         Lab 07/31/23  1231   SODIUM 138   POTASSIUM 5.2   CHLORIDE 103   CO2 22.0   ANION GAP 13.0   BUN 22   CREATININE 1.23   EGFR 65.2   GLUCOSE 159*   CALCIUM 9.3   MAGNESIUM 1.8         Lab 07/31/23  1231   TOTAL PROTEIN 7.6   ALBUMIN 3.8   GLOBULIN 3.8   ALT (SGPT) 25   AST (SGOT) 25   BILIRUBIN <0.2   ALK PHOS 75   LIPASE 19                     UA          7/31/2023    12:30   Urinalysis   Squamous Epithelial Cells, UA 0-2    Specific [place], UA 1.015    Ketones, UA Negative    Blood, UA Small (1+)    Leukocytes, UA Negative    Nitrite, UA Negative    RBC, UA Too Numerous to Count    WBC, UA 0-2    Bacteria, UA None Seen        Microbiology Results (last 10 days)       Procedure Component Value - Date/Time    Respiratory Panel PCR w/COVID-19(SARS-CoV-2) GIANNI/SIENNA/ANNA/PAD/COR/MAD/ASHIA In-House, NP Swab in UTM/VTM, 3-4 HR TAT - Swab,  Nasopharynx [[id number]]  (Normal) Collected: 07/31/23 1328    Lab Status: Final result Specimen: Swab from Nasopharynx Updated: 07/31/23 1439     ADENOVIRUS, PCR Not Detected     Coronavirus 229E Not Detected     Coronavirus HKU1 Not Detected     Coronavirus NL63 Not Detected     Coronavirus OC43 Not Detected     COVID19 Not Detected     Human Metapneumovirus Not Detected     Human Rhinovirus/Enterovirus Not Detected     Influenza A PCR Not Detected     Influenza B PCR Not Detected     Parainfluenza Virus 1 Not Detected     Parainfluenza Virus 2 Not Detected     Parainfluenza Virus 3 Not Detected     Parainfluenza Virus 4 Not Detected     RSV, PCR Not Detected     Bordetella pertussis pcr Not Detected     Bordetella parapertussis PCR Not Detected     Chlamydophila pneumoniae PCR Not Detected     Mycoplasma pneumo by PCR Not Detected    Narrative:      In the setting of a positive respiratory panel with a viral infection PLUS a negative procalcitonin without other underlying concern for bacterial infection, consider observing off antibiotics or discontinuation of antibiotics and continue supportive care. If the respiratory panel is positive for atypical bacterial infection (Bordetella pertussis, Chlamydophila pneumoniae, or Mycoplasma pneumoniae), consider antibiotic de-escalation to target atypical bacterial infection.            CT Head Without Contrast    Result Date: 7/31/2023  CT HEAD WO CONTRAST Date of Exam: 7/31/2023 2:33 PM EDT Indication: AMS. Comparison: 4/29/2022 MR brain Technique: Axial CT images were obtained of the head without contrast administration.  Automated exposure control and iterative construction methods were used. Findings: Parenchyma:No acute intraparenchymal hemorrhage. No loss of gray-white differentiation to suggest large territory infarct. Mild parenchymal volume loss. Scattered periventricular and subcortical white matter hypodensities, nonspecific, but most often consistent with  small vessel ischemic changes. No midline shift or herniation. Ventricles and extra axial spaces:Prominent ventricles and sulci secondary to volume loss. No extra axial fluid collection seen. Other: Right lens replacement. Paranasal sinuses are clear. Mastoid air cells are clear. Calvarium is intact. Intracranial atherosclerotic calcification is present.     Impression: Impression: No evidence of acute hemorrhage or large territory infarct Chronic changes as above. Electronically Signed: Anil Krishnamurthy  7/31/2023 2:54 PM EDT  Workstation ID: [id number]    CT Abdomen Pelvis With Contrast    Result Date: 7/31/2023  CT ABDOMEN PELVIS W CONTRAST Date of Exam: 7/31/2023 2:33 PM EDT Indication: ams. Comparison: CT of the abdomen and pelvis dated 5/6/2017 Technique: Axial CT images were obtained of the abdomen and pelvis following the uneventful intravenous administration of 85 mL Isovue-300. Reconstructed coronal and sagittal images were also obtained. Automated exposure control and iterative construction methods were used. Findings: Examination is limited due to motion artifact. Liver: The liver is unremarkable in morphology. No focal liver lesion is seen. No biliary dilation is seen. Gallbladder: Gallbladder is not well-visualized due to motion artifact. Pancreas: Unremarkable. Spleen: Unremarkable. Adrenal glands: Unremarkable. Genitourinary tract: Kidneys and ureters appear unremarkable. Bladder wall thickening may be related to underdistention or cystitis. Prostate gland is unremarkable. Gastrointestinal tract: Moderate colonic stool is present. Scattered colonic diverticula are seen. No findings to suggest bowel obstruction. Appendix: No findings to suggest acute appendicitis. Other findings: No free air or free fluid is identified. No pathologically enlarged lymph nodes are seen. Vascular calcifications are seen. IVC is unremarkable. Bones and soft tissues: No acute osseous lesion is identified. There are mild  degenerative changes of the spine. There is 10 mm anterolisthesis at L4-L5. Superficial soft tissues demonstrate no acute abnormality. Lung bases: Scattered bibasilar atelectasis.     Impression: Impression: 1.Examination is limited due to motion artifact. 2.Given this limitation, no acute abnormality identified within the abdomen or pelvis. 3.Moderate colonic stool. 4.Bladder wall thickening may be related to underdistention or cystitis. Please correlate with urinalysis. 5.Additional findings as detailed above. Electronically Signed: Ryan Perales  7/31/2023 2:59 PM EDT  Workstation ID: [id number]    XR Chest 1 View    Result Date: 7/31/2023  XR CHEST 1 VW Date of Exam: 7/31/2023 1:02 PM EDT Indication: AMS Comparison: 12/10/2022 Findings: Heart shadow is mildly enlarged. Vasculature is cephalized. Minimal if any perihilar edema present; overall pattern is similar to the prior study. There may be a small area of focal atelectasis in the medial left lung base. No other focal lung disease, effusion or pneumothorax is seen.     Impression: Impression: Moderate pulmonary venous hypertension. Small focus of left lower lobe atelectasis. Electronically Signed: Wisam Oquendo  7/31/2023 1:35 PM EDT  Workstation ID: [id number]         Assessment & Plan  Assessment & Plan     Active Hospital Problems    Diagnosis  POA    **AMS (altered mental status) [R41.82]  Yes    S/P transmetatarsal amputation of foot, left [Z89.432]  Not Applicable    Neurocognitive disorder [R41.9]  Yes    Type 2 diabetes mellitus [E11.9]  Yes    Essential hypertension [I10]  Yes    Psychotic disorder [F29]  Yes     Omkar Nava is a 67 y.o. male with PMHx significant for dementia w/psychosis, DMII, HTN, polysubstance abuse, osteomyelitis L foot s/p left transmetatarsal amputation 12/2022 who currently resides in nursing home who presented with AMS and fever.    AMS   Fever/Sepsis - concern for CNS infection  - empirically cover for meningitis for now given  AMS/headache and fevers, will use ampicillin, CTX, Vancomycin, acyclovir  - LP attempted in the ER as well as IR without success -mostly due to agitation  - infectious work-up including UA, resp PCR, CT abd/pelvis, CT head, CXR unremarkable for clear source of fever  - could consider partially treated cystitis as etiology, however UA is bland  - de-escalate abx as able, re-attempt LP in the AM    Dementia w/Psychosis:  - PRN geodon and ativan for agtitation  - continue home oral regimen when okay to take PO - appears he is on depakote, PRN atarax and nightly scheduled ativan    DMII  - SSI coverage for now    HTN:  - continue home regimen, PRN IV medications     DVT prophylaxis:  Hold Eliquis, Lovenox for now    CODE STATUS:  Daughter states patient is to remain a full code  Code Status and Medical Interventions:   Ordered at: 07/31/23 1801     Level Of Support Discussed With:    Health Care Surrogate     Code Status (Patient has no pulse and is not breathing):    CPR (Attempt to Resuscitate)     Medical Interventions (Patient has pulse or is breathing):    Full Support     Release to patient:    Routine Release     Total time spent: 78 minutes  Time spent includes time reviewing chart, face-to-face time, counseling patient/family/caregiver, ordering medications/tests/procedures, communicating with other health care professionals, documenting clinical information in the electronic health record, and coordination of care.       Expected Discharge TBD  Expected discharge date/ time has not been documented.     Sarah Gann DO  07/31/23      Electronically signed by Sarah Gann DO at 07/31/23 1906       Prior to Admission Medications       Prescriptions Last Dose Informant Patient Reported? Taking?    acetaminophen (TYLENOL) 325 MG tablet 7/30/2023  Yes Yes    Take 2 tablets by mouth Every 6 (Six) Hours As Needed for Mild Pain, Headache or Fever.    albuterol sulfate  (90 Base) MCG/ACT inhaler Past  Month  Yes Yes    Inhale 2 puffs Every 4 (Four) Hours As Needed for Wheezing or Shortness of Air.    apixaban (ELIQUIS) 2.5 MG tablet tablet 7/31/2023 Nursing Home Yes Yes    Take 1 tablet by mouth 2 (Two) Times a Day. VTE ppx    atorvastatin (LIPITOR) 20 MG tablet 7/30/2023  Yes Yes    Take 1 tablet by mouth Every Night.    brimonidine-timolol (COMBIGAN) 0.2-0.5 % ophthalmic solution 7/30/2023  Yes Yes    Administer 1 drop to both eyes 2 (Two) Times a Day.    Cholecalciferol 50 MCG (2000 UT) capsule 7/30/2023 Other Yes Yes    Take 3 capsules by mouth Every Morning.    difluprednate (DUREZOL) 0.05 % ophthalmic emulsion 7/30/2023  Yes Yes    Administer 1 drop into the left eye 4 (Four) Times a Day.    divalproex (DEPAKOTE) 125 MG DR tablet 7/30/2023  Yes Yes    Take 2 tablets by mouth 2 (Two) Times a Day.    Emollient (Aquaphilic) ointment 7/30/2023  Yes Yes    Apply 1 application  topically to the appropriate area as directed 2 (Two) Times a Day. Apply to scalp, lower back, arms, legs, for dry skin    famotidine (PEPCID) 20 MG tablet 7/30/2023  Yes Yes    Take 1 tablet by mouth 2 (Two) Times a Day.    ferrous sulfate 325 (65 FE) MG tablet 7/30/2023 Other Yes Yes    Take 1 tablet by mouth Daily With Breakfast.    furosemide (LASIX) 20 MG tablet 7/31/2023  Yes Yes    Take 2 tablets by mouth Daily.    hydrALAZINE (APRESOLINE) 25 MG tablet 7/30/2023  No Yes    Take 1 tablet by mouth Every 8 (Eight) Hours.    hydrOXYzine (ATARAX) 50 MG tablet Past Week  Yes Yes    Take 1 tablet by mouth Every 6 (Six) Hours As Needed for Anxiety.    insulin glargine (LANTUS, SEMGLEE) 100 UNIT/ML injection 7/30/2023  Yes Yes    Inject 6 Units under the skin into the appropriate area as directed Every Night.    lisinopril (PRINIVIL,ZESTRIL) 40 MG tablet 7/31/2023  No Yes    Take 1 tablet by mouth Daily.    LORazepam (ATIVAN) 0.5 MG tablet 7/30/2023  Yes Yes    Take 1 tablet by mouth every night at bedtime.    melatonin 5 MG tablet tablet  2023 Nursing Home No Yes    Take 1 tablet by mouth Every Night.    metFORMIN (GLUCOPHAGE) 1000 MG tablet 2023 Nursing Home No Yes    Take 1 tablet by mouth 2 (Two) Times a Day With Meals.    Patient taking differently:  Take 0.5 tablets by mouth 3 (Three) Times a Day With Meals.    thiamine (VITAMIN B-1) 100 MG tablet  tablet 2023  Yes Yes    Take 1 tablet by mouth 3 (Three) Times a Day.    cloNIDine (CATAPRES) 0.1 MG tablet Unknown  Yes No    Take 1 tablet by mouth Every 6 (Six) Hours As Needed for High Blood Pressure. SBP >160    Glucagon HCl 1 MG reconstituted solution Unknown  Yes No    Inject 1 mg into the appropriate muscle as directed by prescriber As Needed.    Nepafenac 0.3 % suspension Unknown  Yes No    Administer 1 drop into the left eye every night at bedtime. Start on 23 for 2 days             Physician Progress Notes (most recent note)        Yash Danielson MD at 23 1434              [facility] Medicine Services  PROGRESS NOTE    Patient Name: Omkar Nava  : 1955  MRN: [id number]    Date of Admission: 2023  Primary Care Physician: Deann Cao MD    Subjective   Subjective     CC:  AMS    HPI:  Sister at bedside, believes patient back to his baseline.  He does now know that in [place]. Denies h/a.     ROS:  Unable to assess d/t confusion    Objective   Objective     Vital Signs:   Temp:  [96.7 øF (35.9 øC)-98.2 øF (36.8 øC)] 98.2 øF (36.8 øC)  Heart Rate:  [66-81] 75  Resp:  [18] 18  BP: (148-187)/() 181/84  Flow (L/min):  [3] 3     Physical Exam:  Constitutional: No acute distress, awake, alert, sister at bedside  HENT: NCAT, mucous membranes moist  Respiratory: Clear to auscultation bilaterally, respiratory effort normal   Cardiovascular: RRR, no murmurs, rubs, or gallops  Gastrointestinal: Positive bowel sounds, soft, nontender, nondistended  Musculoskeletal: No bilateral ankle edema, amputated toes on left  foot  Psychiatric: Appropriate affect, cooperative, calm  Neurologic: Oriented roughlyx 2 (can now state that in [place]), strength symmetric in all extremities, Cranial Nerves grossly intact to confrontation, speech clear  Skin: No rashes      Results Reviewed:  LAB RESULTS:      Lab 08/03/23 0424 08/02/23 1023 08/02/23  1022 07/31/23  1231   WBC 6.41 8.46  --  11.55*   HEMOGLOBIN 8.0* 8.9*  --  9.3*   HEMATOCRIT 25.1* 27.6*  --  29.6*   PLATELETS 205 236  --  256   NEUTROS ABS 3.52 5.72  --  9.17*   IMMATURE GRANS (ABS) 0.02 0.03  --  0.06*   LYMPHS ABS 1.91 1.68  --  1.73   MONOS ABS 0.66 0.82  --  0.55   EOS ABS 0.28 0.18  --  0.01   MCV 88.4 87.9  --  88.9   CRP  --  0.59*  --   --    PROCALCITONIN  --  0.09  --  0.05   LACTATE 0.9  --  0.8 1.1         Lab 08/03/23 0424 08/02/23  1023 07/31/23  1231   SODIUM 140 140  140 138   POTASSIUM 4.0 4.2  4.2 5.2   CHLORIDE 105 106  106 103   CO2 25.0 24.0  24.0 22.0   ANION GAP 10.0 10.0  10.0 13.0   BUN 18 20  20 22   CREATININE 1.24 1.31*  1.31* 1.23   EGFR 64.5 60.4  60.4 65.2   GLUCOSE 177* 150*  150* 159*   CALCIUM 8.1* 8.7  8.7 9.3   MAGNESIUM  --   --  1.8         Lab 08/03/23 0424 08/02/23  1023 07/31/23  1231   TOTAL PROTEIN 5.6* 6.4 7.6   ALBUMIN 3.1* 3.2* 3.8   GLOBULIN 2.5 3.2 3.8   ALT (SGPT) 15 19 25   AST (SGOT) 13 17 25   BILIRUBIN <0.2 <0.2 <0.2   ALK PHOS 67 65 75   LIPASE  --   --  19                     Brief Urine Lab Results  (Last result in the past 365 days)        Color   Clarity   Blood   Leuk Est   Nitrite   Protein   CREAT   Urine HCG        08/02/23 1319 Yellow   Clear   Negative   Negative   Negative   30 mg/dL (1+)                   Microbiology Results Abnormal       Procedure Component Value - Date/Time    Blood Culture - Blood, Hand, Left [[id number]]  (Normal) Collected: 07/31/23 1246    Lab Status: Preliminary result Specimen: Blood from Hand, Left Updated: 08/04/23 1316     Blood Culture No growth at 4 days    Blood  Culture - Blood, Arm, Right [[id number]]  (Normal) Collected: 07/31/23 1246    Lab Status: Preliminary result Specimen: Blood from Arm, Right Updated: 08/04/23 1316     Blood Culture No growth at 4 days    Respiratory Panel PCR w/COVID-19(SARS-CoV-2) GIANNI/SIENNA/ANNA/PAD/COR/MAD/ASHIA In-House, NP Swab in UTM/VTM, 3-4 HR TAT - Swab, Nasopharynx [[id number]]  (Normal) Collected: 07/31/23 1328    Lab Status: Final result Specimen: Swab from Nasopharynx Updated: 07/31/23 1439     ADENOVIRUS, PCR Not Detected     Coronavirus 229E Not Detected     Coronavirus HKU1 Not Detected     Coronavirus NL63 Not Detected     Coronavirus OC43 Not Detected     COVID19 Not Detected     Human Metapneumovirus Not Detected     Human Rhinovirus/Enterovirus Not Detected     Influenza A PCR Not Detected     Influenza B PCR Not Detected     Parainfluenza Virus 1 Not Detected     Parainfluenza Virus 2 Not Detected     Parainfluenza Virus 3 Not Detected     Parainfluenza Virus 4 Not Detected     RSV, PCR Not Detected     Bordetella pertussis pcr Not Detected     Bordetella parapertussis PCR Not Detected     Chlamydophila pneumoniae PCR Not Detected     Mycoplasma pneumo by PCR Not Detected    Narrative:      In the setting of a positive respiratory panel with a viral infection PLUS a negative procalcitonin without other underlying concern for bacterial infection, consider observing off antibiotics or discontinuation of antibiotics and continue supportive care. If the respiratory panel is positive for atypical bacterial infection (Bordetella pertussis, Chlamydophila pneumoniae, or Mycoplasma pneumoniae), consider antibiotic de-escalation to target atypical bacterial infection.            No radiology results from the last 24 hrs        Current medications:  Scheduled Meds:apixaban, 2.5 mg, Oral, Q12H  brimonidine, 1 drop, Both Eyes, TID   And  timolol, 1 drop, Both Eyes, BID  cefuroxime, 500 mg, Oral, Q12H  divalproex, 250 mg, Oral, BID  doxycycline,  100 mg, Oral, Q12H  famotidine, 20 mg, Oral, BID  ferrous sulfate, 325 mg, Oral, Daily With Breakfast  hydrALAZINE, 25 mg, Oral, Q8H  insulin lispro, 2-7 Units, Subcutaneous, 4x Daily AC & at Bedtime  lisinopril, 40 mg, Oral, Daily  LORazepam, 0.5 mg, Oral, Nightly  melatonin, 5 mg, Oral, Nightly  povidone-iodine, 1 application , Topical, Daily  senna-docusate sodium, 2 tablet, Oral, BID  sodium chloride, 10 mL, Intravenous, Q12H  thiamine, 100 mg, Oral, TID      Continuous Infusions:     PRN Meds:.  acetaminophen    senna-docusate sodium **AND** polyethylene glycol **AND** bisacodyl **AND** bisacodyl    dextrose    dextrose    glucagon (human recombinant)    hydrALAZINE    hydrOXYzine    LORazepam    ondansetron    [COMPLETED] Insert Peripheral IV **AND** sodium chloride    sodium chloride    sodium chloride    sodium chloride    ziprasidone    Assessment & Plan   Assessment & Plan     Active Hospital Problems    Diagnosis  POA    **AMS (altered mental status) [R41.82]  Yes    S/P transmetatarsal amputation of foot, left [Z89.432]  Not Applicable    Neurocognitive disorder [R41.9]  Yes    Type 2 diabetes mellitus [E11.9]  Yes    Essential hypertension [I10]  Yes    Psychotic disorder [F29]  Yes      Resolved Hospital Problems   No resolved problems to display.        Brief Hospital Course to date:  Omkar Nava is a 67 y.o. male with PMHx significant for dementia w/psychosis, DMII, HTN, polysubstance abuse, osteomyelitis L foot s/p left transmetatarsal amputation 12/2022 who currently resides in nursing home who presented with AMS and fever.     AMS   Fever/Sepsis - concern for CNS infection  - empirically covered for meningitis initially  - LP attempted in the ER as well as IR without success -mostly due to agitation  - infectious work-up including UA, resp PCR, CT abd/pelvis, CT head, CXR unremarkable for clear source of fever  - could consider partially treated cystitis as etiology, however UA is bland  - ID  following s/p IV vanc and rocephin changed to PO doxy and cefuroxime 2023.  Stopped acyclovir.  D/w Dr. Gordon 23 who believes patient close to baseline and with difficulties with patient cooperating with LP  ok with not attempting again.      Dementia w/Psychosis:  - PRN geodon and ativan for agtitation  - continue home oral regimen when okay to take PO - appears he is on depakote, PRN atarax and nightly scheduled ativan     DMII  - SSI coverage for now     HTN:  - continue home regimen, PRN IV medications      Called and d/w daughter, Idalia Lerma over the phone again on 8/3/2023    Expected Discharge Location and Transportation: patient with LTC bed at [facility] and rehab daughter prefers for patient to go somewhere different.  CM following and states that likely need to go back to [facility] and try to change facilities from there  Expected Discharge   Expected Discharge Date: 2023; Expected Discharge Time:      DVT prophylaxis:  Medical DVT prophylaxis orders are present.     AM-PAC 6 Clicks Score (PT): 10 (23 1331)    CODE STATUS:   Code Status and Medical Interventions:   Ordered at: 23 1801     Level Of Support Discussed With:    Health Care Surrogate     Code Status (Patient has no pulse and is not breathing):    CPR (Attempt to Resuscitate)     Medical Interventions (Patient has pulse or is breathing):    Full Support     Release to patient:    Routine Release       Yash Danielson MD  23        Electronically signed by Yash Danielson MD at 23 1445          Physical Therapy Notes (most recent note)        Micaela Kaplan, PT at 23 0911  Version 1 of 1         Patient Name: Omkar Nava  : 1955    MRN: [id number]                              Today's Date: 2023       Admit Date: 2023    Visit Dx:     ICD-10-CM ICD-9-CM   1. Febrile illness  R50.9 780.60   2. 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, without long-term current use of insulin    Orthostatic hypotension    Acute osteomyelitis of left foot    Type 2 diabetes mellitus    Essential hypertension    Psychotic disorder    Neurocognitive disorder    S/P transmetatarsal amputation of foot, left    AMS (altered mental status)     Past Medical History:   Diagnosis Date    Anemia     Dementia     Diabetes mellitus     Dysphagia     GERD (gastroesophageal reflux disease)     History of alcohol abuse     History of cocaine use     History of marijuana use     Hypertension     Osteomyelitis     Poor historian     records obtained from nursing home records & his family    Visual impairment      Past Surgical History:   Procedure Laterality Date    AMPUTATION FOOT Left 10/18/2022    Procedure: PARTIAL FIRST RAY AMPUTATION LEFT;  Surgeon: Yeison Petty MD;  Location:  [facility];  Service: Orthopedics;  Laterality: Left;    AMPUTATION FOOT Left 12/5/2022    Procedure: Transmetatarsal of Left Foot;  Surgeon: Yeison Petty MD;  Location:  [facility];  Service: Orthopedics;  Laterality: Left;    EYE SURGERY        General Information       Row Name 08/04/23 1324          Physical Therapy Time and Intention    Document Type evaluation  -KG     Mode of Treatment physical therapy  -KG       Row Name 08/04/23 1322          General Information    Patient Profile Reviewed yes  -KG     Prior Level of Function --  pt is limited historian; TBA later  -KG     Existing Precautions/Restrictions fall;other (see comments)  dementia; remote L transmetatarsal amputation  -KG     Barriers to Rehab medically complex;previous functional deficit;cognitive status  -KG       Row Name 08/04/23 1322          Living Environment    People in Home facility resident  -KG       Row Name 08/04/23 132           Home Main Entrance    Number of Stairs, Main Entrance none  -KG       Row Name 08/04/23 1324          Stairs Within Home, Primary    Number of Stairs, Within Home, Primary none  -KG       Row Name 08/04/23 1324          Cognition    Orientation Status (Cognition) oriented to;person  -KG       Row Name 08/04/23 1324          Safety Issues, Functional Mobility    Safety Issues Affecting Function (Mobility) ability to follow commands;awareness of need for assistance;insight into deficits/self-awareness;safety precaution awareness;safety precautions follow-through/compliance;sequencing abilities  -KG     Impairments Affecting Function (Mobility) balance;cognition;coordination;endurance/activity tolerance;postural/trunk control;strength  -KG     Cognitive Impairments, Mobility Safety/Performance attention;awareness, need for assistance;insight into deficits/self-awareness;safety precaution awareness;safety precaution follow-through;sequencing abilities  -KG               User Key  (r) = Recorded By, (t) = Taken By, (c) = Cosigned By      Initials Name Provider Type    KG Micaela Kaplan, PT Physical Therapist                   Mobility       Row Name 08/04/23 1325          Bed Mobility    Bed Mobility scooting/bridging;sit-supine  -KG     Scooting/Bridging [place] (Bed Mobility) maximum assist (25% patient effort);2 person assist;verbal cues  -KG     Sit-Supine [place] (Bed Mobility) moderate assist (50% patient effort);verbal cues  -KG     Assistive Device (Bed Mobility) bed rails;draw sheet;head of bed elevated  -KG     Comment, (Bed Mobility) VC's for sequencing and technique. Pt required assistance at trunk and BLEs.  -KG       Row Name 08/04/23 1325          Transfers    Comment, (Transfers) STS x1 from EOB. VC's for sequencing and technique. Pt required increased time and effort to complete. Unable to achieve full upright posture. Pt declined additional mobility despite encouragement.  -KG        Row Name 08/04/23 1325          Sit-Stand Transfer    Sit-Stand [place] (Transfers) moderate assist (50% patient effort);2 person assist;verbal cues  -KG     Assistive Device (Sit-Stand Transfers) other (see comments)  B UE support  -KG       Row Name 08/04/23 1325          Gait/Stairs (Locomotion)    [place] Level (Gait) unable to assess  -KG     Comment, (Gait/Stairs) Ambulation deferred. Not appropriate to assess.  -KG               User Key  (r) = Recorded By, (t) = Taken By, (c) = Cosigned By      Initials Name Provider Type    KG Micaela Kaplan PT Physical Therapist                   Obj/Interventions       Row Name 08/04/23 1327          Range of Motion Comprehensive    General Range of Motion no range of motion deficits identified  -KG     Comment, General Range of Motion B LE WFL  -KG       [facility] Name 08/04/23 1327          Strength Comprehensive (MMT)    Comment, General Manual Muscle Testing (MMT) Assessment B LE grossly 3+/5  -KG       [facility] Name 08/04/23 1327          Balance    Balance Assessment sitting static balance;standing static balance;standing dynamic balance  -KG     Static Sitting Balance contact guard  -KG     Position, Sitting Balance unsupported;sitting edge of bed  -KG     Static Standing Balance moderate assist;2-person assist  -KG     Dynamic Standing Balance moderate assist;2-person assist  -KG     Position/Device Used, Standing Balance supported  -KG       [facility] Name 08/04/23 1327          Sensory Assessment (Somatosensory)    Sensory Assessment (Somatosensory) unable/difficult to assess  -KG               User Key  (r) = Recorded By, (t) = Taken By, (c) = Cosigned By      Initials Name Provider Type    Micaela Perez PT Physical Therapist                   Goals/Plan       Row Name 08/04/23 1331          Bed Mobility Goal 1 (PT)    Activity/Assistive Device (Bed Mobility Goal 1, PT) sit to supine;supine to sit  -KG     [place] Level/Cues Needed (Bed  Mobility Goal 1, PT) contact guard required  -KG     Time Frame (Bed Mobility Goal 1, PT) 2 weeks  -KG     Progress/Outcomes (Bed Mobility Goal 1, PT) goal ongoing  -KG       Row Name 08/04/23 1331          Transfer Goal 1 (PT)    Activity/Assistive Device (Transfer Goal 1, PT) sit-to-stand/stand-to-sit;bed-to-chair/chair-to-bed  -KG     [place] Level/Cues Needed (Transfer Goal 1, PT) minimum assist (75% or more patient effort)  -KG     Time Frame (Transfer Goal 1, PT) 2 weeks  -KG     Progress/Outcome (Transfer Goal 1, PT) goal ongoing  -KG       Row Name 08/04/23 1331          Gait Training Goal 1 (PT)    Activity/Assistive Device (Gait Training Goal 1, PT) gait (walking locomotion);assistive device use;walker, rolling  -KG     [place] Level (Gait Training Goal 1, PT) moderate assist (50-74% patient effort)  -KG     Distance (Gait Training Goal 1, PT) 15 feet  -KG     Time Frame (Gait Training Goal 1, PT) 2 weeks  -KG     Progress/Outcome (Gait Training Goal 1, PT) goal ongoing  -KG       Row Name 08/04/23 1331          Therapy Assessment/Plan (PT)    Planned Therapy Interventions (PT) balance training;bed mobility training;gait training;strengthening;transfer training  -KG               User Key  (r) = Recorded By, (t) = Taken By, (c) = Cosigned By      Initials Name Provider Type    KG Micaela Kaplan, PT Physical Therapist                   Clinical Impression       Row Name 08/04/23 1328          Pain    Additional Documentation Pain Scale: FACES Pre/Post-Treatment (Group)  -KG       Row Name 08/04/23 1328          Pain Scale: FACES Pre/Post-Treatment    Pain: FACES Scale, Pretreatment 0-->no hurt  -KG     Posttreatment Pain Rating 0-->no hurt  -KG       Row Name 08/04/23 1328          Plan of Care Review    Plan of Care Reviewed With patient  -KG     Outcome Evaluation PT initial evaluation completed for pt presenting with generalized weakness, confusion, impaired balance and coordination,  decreased safety awareness, and decreased functional mobility. Pt required modA x2 for STS transfers. Pt's decreased independence warrants PT skilled care. Recommend D/C to SNF.  -KG       Row Name 08/04/23 1328          Therapy Assessment/Plan (PT)    Patient/Family Therapy Goals Statement (PT) pt unable to state  -KG     Rehab Potential (PT) fair, will monitor progress closely  -KG     Criteria for Skilled Interventions Met (PT) yes;skilled treatment is necessary  -KG     Therapy Frequency (PT) daily  -KG       Row Name 08/04/23 1328          Vital Signs    Pre Systolic BP Rehab 178  -KG     Pre Treatment Diastolic BP 91  -KG     Pretreatment Heart Rate (beats/min) 77  -KG     Posttreatment Heart Rate (beats/min) 70  -KG     O2 Delivery Pre Treatment room air  -KG     O2 Delivery Post Treatment room air  -KG     Pre Patient Position Sitting  -KG     Intra Patient Position Standing  -KG     Post Patient Position Supine  -KG       Row Name 08/04/23 1328          Positioning and Restraints    Pre-Treatment Position in bed  -KG     Post Treatment Position bed  -KG     In Bed notified nsg;supine;call light within reach;encouraged to call for assist;exit alarm on;side rails up x3  -KG               User Key  (r) = Recorded By, (t) = Taken By, (c) = Cosigned By      Initials Name Provider Type    KG Micaela Kaplan, PT Physical Therapist                   Outcome Measures       Row Name 08/04/23 1331          How much help from another person do you currently need...    Turning from your back to your side while in flat bed without using bedrails? 2  -KG     Moving from lying on back to sitting on the side of a flat bed without bedrails? 2  -KG     Moving to and from a bed to a chair (including a wheelchair)? 2  -KG     Standing up from a chair using your arms (e.g., wheelchair, bedside chair)? 2  -KG     Climbing 3-5 steps with a railing? 1  -KG     To walk in hospital room? 1  -KG     AM-PAC 6 Clicks Score (PT) 10   -KG     Highest level of mobility 4 --> Transferred to chair/commode  -KG       Row Name 08/04/23 1331 08/04/23 1229       Functional Assessment    Outcome Measure Options AM-PAC 6 Clicks Basic Mobility (PT)  -KG AM-PAC 6 Clicks Daily Activity (OT)  -CS              User Key  (r) = Recorded By, (t) = Taken By, (c) = Cosigned By      Initials Name Provider Type    CS Sarah Jade, OT Occupational Therapist    KG Micaela Kaplan, PT Physical Therapist                                 Physical Therapy Education       Title: PT OT SLP Therapies (In Progress)       Topic: Physical Therapy (In Progress)       Point: Mobility training (In Progress)       Learning Progress Summary             Patient Acceptance, E, NR by KG at 8/4/2023 0911                         Point: Home exercise program (Not Started)       Learner Progress:  Not documented in this visit.              Point: Body mechanics (In Progress)       Learning Progress Summary             Patient Acceptance, E, NR by KG at 8/4/2023 0911                         Point: Precautions (In Progress)       Learning Progress Summary             Patient Acceptance, E, NR by KG at 8/4/2023 0911                                         User Key       Initials Effective Dates Name Provider Type Discipline    KG 05/22/20 -  Micaela Kaplan, PT Physical Therapist PT                  PT Recommendation and Plan  Planned Therapy Interventions (PT): balance training, bed mobility training, gait training, strengthening, transfer training  Plan of Care Reviewed With: patient  Outcome Evaluation: PT initial evaluation completed for pt presenting with generalized weakness, confusion, impaired balance and coordination, decreased safety awareness, and decreased functional mobility. Pt required modA x2 for STS transfers. Pt's decreased independence warrants PT skilled care. Recommend D/C to SNF.     Time Calculation:   PT Evaluation Complexity  History, PT Evaluation  Complexity: 3 or more personal factors and/or comorbidities  Examination of Body Systems (PT Eval Complexity): total of 3 or more elements  Clinical Presentation (PT Evaluation Complexity): evolving  Clinical Decision Making (PT Evaluation Complexity): moderate complexity  Overall Complexity (PT Evaluation Complexity): moderate complexity     PT Charges       Row Name 23 0911             Time Calculation    Start Time 0911  -KG      PT Received On 23  -KG      PT Goal Re-Cert Due Date 23  -KG         Untimed Charges    PT Eval/Re-eval Minutes 51  -KG         Total Minutes    Untimed Charges Total Minutes 51  -KG       Total Minutes 51  -KG                User Key  (r) = Recorded By, (t) = Taken By, (c) = Cosigned By      Initials Name Provider Type    KG Micaela Kaplan, PT Physical Therapist                  Therapy Charges for Today       Code Description Service Date Service Provider Modifiers Qty    [id number] HC PT EVAL MOD COMPLEXITY 4 2023 Micaela Kaplan, PT GP 1            PT G-Codes  Outcome Measure Options: AM-PAC 6 Clicks Basic Mobility (PT)  AM-PAC 6 Clicks Score (PT): 10  AM-PAC 6 Clicks Score (OT): 11  PT Discharge Summary  Anticipated Discharge Disposition (PT): skilled nursing facility    Lisette Kaplan PT  2023      Electronically signed by Micaela Kaplan, PT at 23 1332          Occupational Therapy Notes (most recent note)        Sarah Jade, OT at 23 0850          Patient Name: Omkar Nava  : 1955    MRN: [id number]                              Today's Date: 2023       Admit Date: 2023    Visit Dx:     ICD-10-CM ICD-9-CM   1. Febrile illness  R50.9 780.60   2. Altered mental status, unspecified altered mental status type  R41.82 780.97     Patient Active Problem List   Diagnosis    Precordial pain    Weight loss, unintentional    Nausea    Uncontrolled type 2 diabetes mellitus with mild nonproliferative  retinopathy and macular edema, without long-term current use of insulin    Orthostatic hypotension    Acute osteomyelitis of left foot    Type 2 diabetes mellitus    Essential hypertension    Psychotic disorder    Neurocognitive disorder    S/P transmetatarsal amputation of foot, left    AMS (altered mental status)     Past Medical History:   Diagnosis Date    Anemia     Dementia     Diabetes mellitus     Dysphagia     GERD (gastroesophageal reflux disease)     History of alcohol abuse     History of cocaine use     History of marijuana use     Hypertension     Osteomyelitis     Poor historian     records obtained from nursing home records & his family    Visual impairment      Past Surgical History:   Procedure Laterality Date    AMPUTATION FOOT Left 10/18/2022    Procedure: PARTIAL FIRST RAY AMPUTATION LEFT;  Surgeon: Yeison Petty MD;  Location:  [facility];  Service: Orthopedics;  Laterality: Left;    AMPUTATION FOOT Left 12/5/2022    Procedure: Transmetatarsal of Left Foot;  Surgeon: Yeison Petty MD;  Location:  [facility];  Service: Orthopedics;  Laterality: Left;    EYE SURGERY        General Information       Row Name 08/04/23 1220          OT Time and Intention    Document Type evaluation  -CS     Mode of Treatment occupational therapy  -CS       Row Name 08/04/23 1220          General Information    Patient Profile Reviewed yes  -CS     Prior Level of Function --  Pt limited historian, TBA further  -CS     Existing Precautions/Restrictions fall;other (see comments)  dementia, h/o L transmetatarsal amputation  -CS     Barriers to Rehab medically complex;previous functional deficit;cognitive status  -CS       Row Name 08/04/23 1220          Living Environment    People in Home facility resident  LT  -CS       Row Name 08/04/23 1220          Cognition    Orientation Status (Cognition) oriented to;person;disoriented to;place;situation;time  -CS       Row Name 08/04/23 1220          Safety Issues, Functional  Mobility    Impairments Affecting Function (Mobility) balance;cognition;strength;motor planning;postural/trunk control;coordination;range of motion (ROM);sensation/sensory awareness  -CS     Cognitive Impairments, Mobility Safety/Performance attention;insight into deficits/self-awareness;sequencing abilities;impulsivity;awareness, need for assistance;safety precaution awareness;safety precaution follow-through;judgment;problem-solving/reasoning  -CS               User Key  (r) = Recorded By, (t) = Taken By, (c) = Cosigned By      Initials Name Provider Type    CS Sarah Jade OT Occupational Therapist                     Mobility/ADL's       Row Name 08/04/23 1222          Bed Mobility    Bed Mobility rolling left;rolling right;supine-sit  -CS     Rolling Left [place] (Bed Mobility) moderate assist (50% patient effort);2 person assist;verbal cues  -CS     Rolling Right [place] (Bed Mobility) moderate assist (50% patient effort);verbal cues  -CS     Supine-Sit [place] (Bed Mobility) moderate assist (50% patient effort);verbal cues  -CS     Assistive Device (Bed Mobility) bed rails;draw sheet  -       Row Name 08/04/23 1222          Transfers    Transfers sit-stand transfer;stand-sit transfer  -       Row Name 08/04/23 1222          Sit-Stand Transfer    Sit-Stand [place] (Transfers) moderate assist (50% patient effort);2 person assist;verbal cues  -       Row Name 08/04/23 1222          Stand-Sit Transfer    Stand-Sit [place] (Transfers) moderate assist (50% patient effort);2 person assist;verbal cues  -       Row Name 08/04/23 1222          Activities of Daily Living    BADL Assessment/Intervention lower body dressing;feeding;toileting;upper body dressing  -       Row Name 08/04/23 1222          Lower Body Dressing Assessment/Training    [place] Level (Lower Body Dressing) doff;don;maximum assist (25% patient effort)  -CS     Position (Lower Body Dressing) supine  -CS      Comment, (Lower Body Dressing) hosp gown  -CS       Row Name 08/04/23 1222          Self-Feeding Assessment/Training    [place] Level (Feeding) liquids to mouth;set up  -CS     Position (Self-Feeding) sitting up in bed  -CS       Row Name 08/04/23 1222          Toileting Assessment/Training    [place] Level (Toileting) adjust/manage clothing;change pad/brief;perform perineal hygiene;dependent (less than 25% patient effort)  -CS     Position (Toileting) supine  -CS       Row Name 08/04/23 1222          Upper Body Dressing Assessment/Training    [place] Level (Upper Body Dressing) doff;don;moderate assist (50% patient effort)  -CS     Position (Upper Body Dressing) sitting up in bed  -     Comment, (Upper Body Dressing) hosp gown  -CS               User Key  (r) = Recorded By, (t) = Taken By, (c) = Cosigned By      Initials Name Provider Type    CS Sarah Jade, OT Occupational Therapist                   Obj/Interventions       Row Name 08/04/23 1223          Sensory Assessment (Somatosensory)    Sensory Assessment (Somatosensory) unable/difficult to assess  -       Row Name 08/04/23 1223          Vision Assessment/Intervention    Visual Impairment/Limitations unable/difficult to assess  -       Row Name 08/04/23 1223          Range of Motion Comprehensive    General Range of Motion bilateral upper extremity ROM WFL  -       Row Name 08/04/23 1223          Strength Comprehensive (MMT)    Comment, General Manual Muscle Testing (MMT) Assessment BUE observed grossly 4-/5  -CS       Row Name 08/04/23 1223          Balance    Balance Assessment sitting static balance;sitting dynamic balance;standing static balance;standing dynamic balance  -     Static Sitting Balance contact guard  -CS     Dynamic Sitting Balance minimal assist  -CS     Position, Sitting Balance sitting edge of bed  -     Static Standing Balance moderate assist;2-person assist  -     Dynamic Standing Balance moderate  assist;2-person assist  -CS     Position/Device Used, Standing Balance supported  -CS     Balance Interventions sitting;standing;static;dynamic;dynamic reaching;occupation based/functional task;weight shifting activity  -CS               User Key  (r) = Recorded By, (t) = Taken By, (c) = Cosigned By      Initials Name Provider Type    CS Sarah Jade OT Occupational Therapist                   Goals/Plan       Row Name 08/04/23 1228          Transfer Goal 1 (OT)    Activity/Assistive Device (Transfer Goal 1, OT) sit-to-stand/stand-to-sit;toilet  -CS     [place] Level/Cues Needed (Transfer Goal 1, OT) minimum assist (75% or more patient effort)  -CS     Time Frame (Transfer Goal 1, OT) long term goal (LTG);10 days  -CS     Progress/Outcome (Transfer Goal 1, OT) goal ongoing  -       Row Name 08/04/23 1228          Grooming Goal 1 (OT)    Activity/Device (Grooming Goal 1, OT) oral care;wash face, hands  -CS     [place] (Grooming Goal 1, OT) standby assist  -CS     Time Frame (Grooming Goal 1, OT) long term goal (LTG);10 days  -CS     Strategies/Barriers (Grooming Goal 1, OT) unsupported sitting  -CS     Progress/Outcome (Grooming Goal 1, OT) goal ongoing  -       Row Name 08/04/23 1228          Strength Goal 1 (OT)    Strength Goal 1 (OT) Pt will tolerate BUE HEP w/ red thera-band x15 reps.  -CS     Time Frame (Strength Goal 1, OT) long term goal (LTG);10 days  -CS     Progress/Outcome (Strength Goal 1, OT) goal ongoing  -       Row Name 08/04/23 1228          Therapy Assessment/Plan (OT)    Planned Therapy Interventions (OT) activity tolerance training;adaptive equipment training;BADL retraining;functional balance retraining;occupation/activity based interventions;ROM/therapeutic exercise;strengthening exercise;transfer/mobility retraining  -CS               User Key  (r) = Recorded By, (t) = Taken By, (c) = Cosigned By      Initials Name Provider Type    CS Sarah Jade OT Occupational  Therapist                   Clinical Impression       Row Name 08/04/23 1223          Pain Assessment    Additional Documentation Pain Scale: FACES Pre/Post-Treatment (Group)  -CS       Row Name 08/04/23 1223          Pain Scale: FACES Pre/Post-Treatment    Pain: FACES Scale, Pretreatment 0-->no hurt  -CS     Posttreatment Pain Rating 0-->no hurt  -CS       Row Name 08/04/23 1223          Plan of Care Review    Plan of Care Reviewed With patient  -CS     Progress improving  -CS     Outcome Evaluation OT eval cpmplete. Pt presents w/ cognitive deficits, decreased safety awareness, balance deficits, and generalized weakness limiting functional independence from baseline. Recommend cont skilled IPOT POC. Recommend pt DC to SNF.  -CS       Row Name 08/04/23 1223          Therapy Assessment/Plan (OT)    Patient/Family Therapy Goal Statement (OT) Return to PLOF  -CS     Rehab Potential (OT) good, to achieve stated therapy goals  -CS     Criteria for Skilled Therapeutic Interventions Met (OT) yes;skilled treatment is necessary  -CS     Therapy Frequency (OT) daily  -CS       Row Name 08/04/23 1223          Therapy Plan Review/Discharge Plan (OT)    Anticipated Discharge Disposition (OT) skilled nursing facility  -CS       Row Name 08/04/23 1223          Vital Signs    Pre Systolic BP Rehab --  VSS  -CS     O2 Delivery Pre Treatment room air  -CS     O2 Delivery Intra Treatment room air  -CS     O2 Delivery Post Treatment room air  -CS     Pre Patient Position Supine  -CS     Intra Patient Position Standing  -CS     Post Patient Position Sitting  -CS       Row Name 08/04/23 1223          Positioning and Restraints    Pre-Treatment Position in bed  -CS     Post Treatment Position bed  -CS     In Bed notified nsg;sitting EOB;with PT;call light within reach;encouraged to call for assist  -CS               User Key  (r) = Recorded By, (t) = Taken By, (c) = Cosigned By      Initials Name Provider Type    Sarah Renae, OT  Occupational Therapist                   Outcome Measures       Row Name 08/04/23 1229          How much help from another is currently needed...    Putting on and taking off regular lower body clothing? 1  -CS     Bathing (including washing, rinsing, and drying) 2  -CS     Toileting (which includes using toilet bed pan or urinal) 1  -CS     Putting on and taking off regular upper body clothing 2  -CS     Taking care of personal grooming (such as brushing teeth) 2  -CS     Eating meals 3  -CS     AM-PAC 6 Clicks Score (OT) 11  -CS       Row Name 08/04/23 1229          Functional Assessment    Outcome Measure Options AM-PAC 6 Clicks Daily Activity (OT)  -CS               User Key  (r) = Recorded By, (t) = Taken By, (c) = Cosigned By      Initials Name Provider Type    CS Sarah Jade OT Occupational Therapist                    Occupational Therapy Education       Title: PT OT SLP Therapies (In Progress)       Topic: Occupational Therapy (In Progress)       Point: ADL training (In Progress)       Description:   Instruct learner(s) on proper safety adaptation and remediation techniques during self care or transfers.   Instruct in proper use of assistive devices.                  Learning Progress Summary             Patient Acceptance, E, NR by CS at 8/4/2023 1229                         Point: Home exercise program (Not Started)       Description:   Instruct learner(s) on appropriate technique for monitoring, assisting and/or progressing therapeutic exercises/activities.                  Learner Progress:  Not documented in this visit.              Point: Precautions (In Progress)       Description:   Instruct learner(s) on prescribed precautions during self-care and functional transfers.                  Learning Progress Summary             Patient Acceptance, E, NR by CS at 8/4/2023 1229                         Point: Body mechanics (In Progress)       Description:   Instruct learner(s) on proper positioning  and spine alignment during self-care, functional mobility activities and/or exercises.                  Learning Progress Summary             Patient Acceptance, E, NR by  at 8/4/2023 1229                                         User Key       Initials Effective Dates Name Provider Type Discipline     09/02/21 -  Sarah Jade OT Occupational Therapist OT                  OT Recommendation and Plan  Planned Therapy Interventions (OT): activity tolerance training, adaptive equipment training, BADL retraining, functional balance retraining, occupation/activity based interventions, ROM/therapeutic exercise, strengthening exercise, transfer/mobility retraining  Therapy Frequency (OT): daily  Plan of Care Review  Plan of Care Reviewed With: patient  Progress: improving  Outcome Evaluation: OT eval cpmplete. Pt presents w/ cognitive deficits, decreased safety awareness, balance deficits, and generalized weakness limiting functional independence from baseline. Recommend cont skilled IPOT POC. Recommend pt DC to SNF.     Time Calculation:   Evaluation Complexity (OT)  Review Occupational Profile/Medical/Therapy History Complexity: brief/low complexity  Assessment, Occupational Performance/Identification of Deficit Complexity: 3-5 performance deficits  Clinical Decision Making Complexity (OT): problem focused assessment/low complexity  Overall Complexity of Evaluation (OT): low complexity     Time Calculation- OT       Row Name 08/04/23 1229             Time Calculation- OT    OT Start Time 0850  -CS      OT Received On 08/04/23  -CS      OT Goal Re-Cert Due Date 08/14/23  -CS         Untimed Charges    OT Eval/Re-eval Minutes 46  -CS         Total Minutes    Untimed Charges Total Minutes 46  -CS       Total Minutes 46  -CS                User Key  (r) = Recorded By, (t) = Taken By, (c) = Cosigned By      Initials Name Provider Type     Sarah Jade OT Occupational Therapist                  Therapy Charges for  Today       Code Description Service Date Service Provider Modifiers Qty    [id number] HC OT EVAL LOW COMPLEXITY 4 8/4/2023 Sarah Jade OT GO 1                 Sarah Jade OT  8/4/2023    Electronically signed by Sarah Jade OT at 08/04/23 1231

## 2023-08-04 NOTE — PLAN OF CARE
Goal Outcome Evaluation:  Plan of Care Reviewed With: patient        Progress: improving  Outcome Evaluation: OT eval cpmplete. Pt presents w/ cognitive deficits, decreased safety awareness, balance deficits, and generalized weakness limiting functional independence from baseline. Recommend cont skilled IPOT POC. Recommend pt DC to SNF.      Anticipated Discharge Disposition (OT): skilled nursing facility

## 2023-08-05 LAB
BACTERIA SPEC AEROBE CULT: NORMAL
BACTERIA SPEC AEROBE CULT: NORMAL
DEPRECATED RDW RBC AUTO: 44.6 FL (ref 37–54)
ERYTHROCYTE [DISTWIDTH] IN BLOOD BY AUTOMATED COUNT: 13.9 % (ref 12.3–15.4)
FERRITIN SERPL-MCNC: 136.2 NG/ML (ref 30–400)
FOLATE SERPL-MCNC: 8.68 NG/ML (ref 4.78–24.2)
GLUCOSE BLDC GLUCOMTR-MCNC: 227 MG/DL (ref 70–130)
GLUCOSE BLDC GLUCOMTR-MCNC: 265 MG/DL (ref 70–130)
GLUCOSE BLDC GLUCOMTR-MCNC: 295 MG/DL (ref 70–130)
GLUCOSE BLDC GLUCOMTR-MCNC: 346 MG/DL (ref 70–130)
HCT VFR BLD AUTO: 24.2 % (ref 37.5–51)
HGB BLD-MCNC: 7.9 G/DL (ref 13–17.7)
IRON 24H UR-MRATE: 40 MCG/DL (ref 59–158)
IRON SATN MFR SERPL: 14 % (ref 20–50)
MCH RBC QN AUTO: 28.8 PG (ref 26.6–33)
MCHC RBC AUTO-ENTMCNC: 32.6 G/DL (ref 31.5–35.7)
MCV RBC AUTO: 88.3 FL (ref 79–97)
PLATELET # BLD AUTO: 222 10*3/MM3 (ref 140–450)
PMV BLD AUTO: 11.2 FL (ref 6–12)
RBC # BLD AUTO: 2.74 10*6/MM3 (ref 4.14–5.8)
TIBC SERPL-MCNC: 282 MCG/DL (ref 298–536)
TRANSFERRIN SERPL-MCNC: 189 MG/DL (ref 200–360)
VIT B12 BLD-MCNC: 507 PG/ML (ref 211–946)
WBC NRBC COR # BLD: 6.17 10*3/MM3 (ref 3.4–10.8)

## 2023-08-05 PROCEDURE — 99231 SBSQ HOSP IP/OBS SF/LOW 25: CPT | Performed by: NURSE PRACTITIONER

## 2023-08-05 PROCEDURE — 83540 ASSAY OF IRON: CPT | Performed by: INTERNAL MEDICINE

## 2023-08-05 PROCEDURE — 82948 REAGENT STRIP/BLOOD GLUCOSE: CPT

## 2023-08-05 PROCEDURE — 85027 COMPLETE CBC AUTOMATED: CPT | Performed by: INTERNAL MEDICINE

## 2023-08-05 PROCEDURE — 25010000002 HYDRALAZINE PER 20 MG: Performed by: INTERNAL MEDICINE

## 2023-08-05 PROCEDURE — 82746 ASSAY OF FOLIC ACID SERUM: CPT | Performed by: INTERNAL MEDICINE

## 2023-08-05 PROCEDURE — 82607 VITAMIN B-12: CPT | Performed by: INTERNAL MEDICINE

## 2023-08-05 PROCEDURE — 63710000001 INSULIN LISPRO (HUMAN) PER 5 UNITS: Performed by: INTERNAL MEDICINE

## 2023-08-05 PROCEDURE — 84466 ASSAY OF TRANSFERRIN: CPT | Performed by: INTERNAL MEDICINE

## 2023-08-05 PROCEDURE — 82728 ASSAY OF FERRITIN: CPT | Performed by: INTERNAL MEDICINE

## 2023-08-05 RX ADMIN — HYDRALAZINE HYDROCHLORIDE 25 MG: 25 TABLET, FILM COATED ORAL at 13:40

## 2023-08-05 RX ADMIN — SENNOSIDES AND DOCUSATE SODIUM 2 TABLET: 50; 8.6 TABLET ORAL at 08:22

## 2023-08-05 RX ADMIN — HYDRALAZINE HYDROCHLORIDE 25 MG: 25 TABLET, FILM COATED ORAL at 05:10

## 2023-08-05 RX ADMIN — APIXABAN 2.5 MG: 2.5 TABLET, FILM COATED ORAL at 08:23

## 2023-08-05 RX ADMIN — HYDRALAZINE HYDROCHLORIDE 25 MG: 25 TABLET, FILM COATED ORAL at 21:19

## 2023-08-05 RX ADMIN — FERROUS SULFATE TAB 325 MG (65 MG ELEMENTAL FE) 325 MG: 325 (65 FE) TAB at 08:22

## 2023-08-05 RX ADMIN — Medication 5 MG: at 21:19

## 2023-08-05 RX ADMIN — LISINOPRIL 40 MG: 40 TABLET ORAL at 08:23

## 2023-08-05 RX ADMIN — INSULIN LISPRO 4 UNITS: 100 INJECTION, SOLUTION INTRAVENOUS; SUBCUTANEOUS at 08:23

## 2023-08-05 RX ADMIN — CEFUROXIME AXETIL 500 MG: 250 TABLET, FILM COATED ORAL at 08:23

## 2023-08-05 RX ADMIN — HYDRALAZINE HYDROCHLORIDE 10 MG: 20 INJECTION INTRAMUSCULAR; INTRAVENOUS at 11:48

## 2023-08-05 RX ADMIN — CEFUROXIME AXETIL 500 MG: 250 TABLET, FILM COATED ORAL at 21:19

## 2023-08-05 RX ADMIN — FAMOTIDINE 20 MG: 20 TABLET, FILM COATED ORAL at 08:23

## 2023-08-05 RX ADMIN — BRIMONIDINE TARTRATE 1 DROP: 2 SOLUTION OPHTHALMIC at 15:55

## 2023-08-05 RX ADMIN — Medication 10 ML: at 21:20

## 2023-08-05 RX ADMIN — POVIDONE-IODINE 1 EACH: 10 SOLUTION TOPICAL at 08:21

## 2023-08-05 RX ADMIN — APIXABAN 2.5 MG: 2.5 TABLET, FILM COATED ORAL at 21:19

## 2023-08-05 RX ADMIN — Medication 10 ML: at 08:29

## 2023-08-05 RX ADMIN — DIVALPROEX SODIUM 250 MG: 250 TABLET, DELAYED RELEASE ORAL at 21:19

## 2023-08-05 RX ADMIN — TIMOLOL MALEATE 1 DROP: 5 SOLUTION/ DROPS OPHTHALMIC at 21:20

## 2023-08-05 RX ADMIN — TIMOLOL MALEATE 1 DROP: 5 SOLUTION/ DROPS OPHTHALMIC at 08:22

## 2023-08-05 RX ADMIN — DOXYCYCLINE 100 MG: 100 CAPSULE ORAL at 08:23

## 2023-08-05 RX ADMIN — BRIMONIDINE TARTRATE 1 DROP: 2 SOLUTION OPHTHALMIC at 21:20

## 2023-08-05 RX ADMIN — INSULIN LISPRO 5 UNITS: 100 INJECTION, SOLUTION INTRAVENOUS; SUBCUTANEOUS at 21:20

## 2023-08-05 RX ADMIN — FAMOTIDINE 20 MG: 20 TABLET, FILM COATED ORAL at 21:19

## 2023-08-05 RX ADMIN — THIAMINE HCL TAB 100 MG 100 MG: 100 TAB at 15:55

## 2023-08-05 RX ADMIN — INSULIN LISPRO 4 UNITS: 100 INJECTION, SOLUTION INTRAVENOUS; SUBCUTANEOUS at 11:48

## 2023-08-05 RX ADMIN — INSULIN LISPRO 3 UNITS: 100 INJECTION, SOLUTION INTRAVENOUS; SUBCUTANEOUS at 17:56

## 2023-08-05 RX ADMIN — DIVALPROEX SODIUM 250 MG: 250 TABLET, DELAYED RELEASE ORAL at 08:22

## 2023-08-05 RX ADMIN — HYDROXYZINE HYDROCHLORIDE 50 MG: 25 TABLET, FILM COATED ORAL at 13:39

## 2023-08-05 RX ADMIN — DOXYCYCLINE 100 MG: 100 CAPSULE ORAL at 21:19

## 2023-08-05 RX ADMIN — THIAMINE HCL TAB 100 MG 100 MG: 100 TAB at 21:19

## 2023-08-05 RX ADMIN — HYDRALAZINE HYDROCHLORIDE 10 MG: 20 INJECTION INTRAMUSCULAR; INTRAVENOUS at 19:30

## 2023-08-05 RX ADMIN — THIAMINE HCL TAB 100 MG 100 MG: 100 TAB at 08:23

## 2023-08-05 RX ADMIN — BRIMONIDINE TARTRATE 1 DROP: 2 SOLUTION OPHTHALMIC at 08:22

## 2023-08-05 NOTE — PLAN OF CARE
Goal Outcome Evaluation:     Pt has been pleasant and cooperative this shift. Pt's hands washed at the request of daughter. Pt slept well, with no complaints. Pt awoke around 04:30 am, Pt fed watermelon and easily went back to sleep. Pt was calm and pleasant during interaction. VSS, RA, SA-First degree AV Block w/ PACs, afebrile. Continue POC.      Problem: Fall Injury Risk  Goal: Absence of Fall and Fall-Related Injury  Outcome: Ongoing, Progressing  Intervention: Identify and Manage Contributors  Recent Flowsheet Documentation  Taken 8/4/2023 2040 by Corrine Hernandez RN  Medication Review/Management: medications reviewed  Intervention: Promote Injury-Free Environment  Recent Flowsheet Documentation  Taken 8/5/2023 0600 by Corrine Hernandez RN  Safety Promotion/Fall Prevention: safety round/check completed  Taken 8/5/2023 0400 by Corrine Hernandez RN  Safety Promotion/Fall Prevention: safety round/check completed  Taken 8/5/2023 0200 by Corrine Hernandez RN  Safety Promotion/Fall Prevention: safety round/check completed  Taken 8/5/2023 0000 by Mary, Corrine, RN  Safety Promotion/Fall Prevention: safety round/check completed  Taken 8/4/2023 2200 by Corrine Hernandez RN  Safety Promotion/Fall Prevention: safety round/check completed  Taken 8/4/2023 2040 by Mary, Corrine, RN  Safety Promotion/Fall Prevention:   activity supervised   clutter free environment maintained   fall prevention program maintained   nonskid shoes/slippers when out of bed   room organization consistent   safety round/check completed     Problem: Skin Injury Risk Increased  Goal: Skin Health and Integrity  Outcome: Ongoing, Progressing  Intervention: Optimize Skin Protection  Recent Flowsheet Documentation  Taken 8/5/2023 0600 by Corrine Hernandez RN  Pressure Reduction Techniques:   frequent weight shift encouraged   positioned off wounds   pressure points protected  Head of Bed (HOB) Positioning: HOB at 20  degrees  Pressure Reduction Devices:   positioning supports utilized   pressure-redistributing mattress utilized  Skin Protection:   adhesive use limited   incontinence pads utilized   tubing/devices free from skin contact  Taken 8/5/2023 0400 by Corrine Hernandez RN  Pressure Reduction Techniques:   frequent weight shift encouraged   positioned off wounds   pressure points protected  Head of Bed (HOB) Positioning: [facility] at 20-30 degrees  Pressure Reduction Devices:   positioning supports utilized   pressure-redistributing mattress utilized  Skin Protection:   adhesive use limited   incontinence pads utilized   tubing/devices free from skin contact  Taken 8/5/2023 0200 by Corrine Hernandez RN  Pressure Reduction Techniques:   frequent weight shift encouraged   positioned off wounds   pressure points protected  Head of Bed (HOB) Positioning: HOB at 20-30 degrees  Pressure Reduction Devices:   positioning supports utilized   pressure-redistributing mattress utilized  Skin Protection:   adhesive use limited   incontinence pads utilized   tubing/devices free from skin contact  Taken 8/5/2023 0000 by Corrine Hernandez RN  Pressure Reduction Techniques:   frequent weight shift encouraged   pressure points protected   positioned off wounds  Head of Bed (HOB) Positioning: [facility] at 30 degrees  Pressure Reduction Devices:   pressure-redistributing mattress utilized   positioning supports utilized  Skin Protection:   adhesive use limited   incontinence pads utilized   tubing/devices free from skin contact  Taken 8/4/2023 2200 by Corrine Hernandez RN  Pressure Reduction Techniques:   positioned off wounds   pressure points protected  Head of Bed (HOB) Positioning: [facility] at 30-45 degrees  Pressure Reduction Devices:   pressure-redistributing mattress utilized   positioning supports utilized  Skin Protection:   adhesive use limited   incontinence pads utilized   tubing/devices free from skin contact  Taken 8/4/2023 2040 by  Corrine Hernandez RN  Pressure Reduction Techniques:   frequent weight shift encouraged   positioned off wounds   pressure points protected  Pressure Reduction Devices:   pressure-redistributing mattress utilized   positioning supports utilized  Skin Protection:   adhesive use limited   incontinence pads utilized   tubing/devices free from skin contact     Problem: Adult Inpatient Plan of Care  Goal: Plan of Care Review  Outcome: Ongoing, Progressing  Goal: Patient-Specific Goal (Individualized)  Outcome: Ongoing, Progressing  Goal: Absence of Hospital-Acquired Illness or Injury  Outcome: Ongoing, Progressing  Intervention: Identify and Manage Fall Risk  Recent Flowsheet Documentation  Taken 8/5/2023 0600 by Corrine Hernandez RN  Safety Promotion/Fall Prevention: safety round/check completed  Taken 8/5/2023 0400 by Corrine Hernandez RN  Safety Promotion/Fall Prevention: safety round/check completed  Taken 8/5/2023 0200 by Corrine Hernandez RN  Safety Promotion/Fall Prevention: safety round/check completed  Taken 8/5/2023 0000 by Mary, Corrine, RN  Safety Promotion/Fall Prevention: safety round/check completed  Taken 8/4/2023 2200 by Corrine Hernandez RN  Safety Promotion/Fall Prevention: safety round/check completed  Taken 8/4/2023 2040 by Mary, Corrine, RN  Safety Promotion/Fall Prevention:   activity supervised   clutter free environment maintained   fall prevention program maintained   nonskid shoes/slippers when out of bed   room organization consistent   safety round/check completed  Intervention: Prevent Skin Injury  Recent Flowsheet Documentation  Taken 8/5/2023 0600 by Corrine Hernandez RN  Body Position: position changed independently  Skin Protection:   adhesive use limited   incontinence pads utilized   tubing/devices free from skin contact  Taken 8/5/2023 0400 by Corrine Hernandez RN  Body Position: position changed independently  Skin Protection:   adhesive use limited    incontinence pads utilized   tubing/devices free from skin contact  Taken 8/5/2023 0200 by Corrine Hernandez RN  Body Position: position changed independently  Skin Protection:   adhesive use limited   incontinence pads utilized   tubing/devices free from skin contact  Taken 8/5/2023 0000 by Corrine Hernandez RN  Body Position: position changed independently  Skin Protection:   adhesive use limited   incontinence pads utilized   tubing/devices free from skin contact  Taken 8/4/2023 2200 by Corrine Hernandez RN  Body Position: position changed independently  Skin Protection:   adhesive use limited   incontinence pads utilized   tubing/devices free from skin contact  Taken 8/4/2023 2040 by Corrine Hernandez RN  Body Position: position changed independently  Skin Protection:   adhesive use limited   incontinence pads utilized   tubing/devices free from skin contact  Intervention: Prevent Infection  Recent Flowsheet Documentation  Taken 8/5/2023 0600 by Corrine Hernandez RN  Infection Prevention:   cohorting utilized   rest/sleep promoted   single patient room provided  Taken 8/5/2023 0400 by Corrine Hernandez RN  Infection Prevention:   cohorting utilized   rest/sleep promoted   single patient room provided  Taken 8/5/2023 0200 by Corrine Hernandez RN  Infection Prevention:   cohorting utilized   rest/sleep promoted   single patient room provided  Taken 8/5/2023 0000 by Corrine Hernandez RN  Infection Prevention:   cohorting utilized   rest/sleep promoted   single patient room provided  Taken 8/4/2023 2200 by Corrine Hernandez RN  Infection Prevention:   cohorting utilized   rest/sleep promoted   single patient room provided  Taken 8/4/2023 2040 by Corrine Hernandez RN  Infection Prevention:   cohorting utilized   rest/sleep promoted   single patient room provided  Goal: Optimal Comfort and Wellbeing  Outcome: Ongoing, Progressing  Goal: Readiness for Transition of Care  Outcome: Ongoing,  Progressing     Problem: Adjustment to Illness (Sepsis/Septic Shock)  Goal: Optimal Coping  Outcome: Ongoing, Progressing     Problem: Bleeding (Sepsis/Septic Shock)  Goal: Absence of Bleeding  Outcome: Ongoing, Progressing     Problem: Glycemic Control Impaired (Sepsis/Septic Shock)  Goal: Blood Glucose Level Within Desired Range  Outcome: Ongoing, Progressing  Intervention: Optimize Glycemic Control  Recent Flowsheet Documentation  Taken 8/4/2023 2040 by Corrine Hernandez RN  Glycemic Management: blood glucose monitored     Problem: Infection Progression (Sepsis/Septic Shock)  Goal: Absence of Infection Signs and Symptoms  Outcome: Ongoing, Progressing  Intervention: Initiate Sepsis Management  Recent Flowsheet Documentation  Taken 8/5/2023 0600 by Corrine Hernandez RN  Infection Prevention:   cohorting utilized   rest/sleep promoted   single patient room provided  Taken 8/5/2023 0400 by Corrine Hernandez RN  Infection Prevention:   cohorting utilized   rest/sleep promoted   single patient room provided  Taken 8/5/2023 0200 by Corrine Hernandez RN  Infection Prevention:   cohorting utilized   rest/sleep promoted   single patient room provided  Taken 8/5/2023 0000 by Corrine Hernandez RN  Infection Prevention:   cohorting utilized   rest/sleep promoted   single patient room provided  Taken 8/4/2023 2200 by Corrine Hernandez RN  Infection Prevention:   cohorting utilized   rest/sleep promoted   single patient room provided  Taken 8/4/2023 2040 by Corrine Hernandez RN  Infection Prevention:   cohorting utilized   rest/sleep promoted   single patient room provided     Problem: Nutrition Impaired (Sepsis/Septic Shock)  Goal: Optimal Nutrition Intake  Outcome: Ongoing, Progressing     Problem: Confusion Acute  Goal: Optimal Cognitive Function  Outcome: Ongoing, Progressing

## 2023-08-05 NOTE — PROGRESS NOTES
[facility] Medicine Services  PROGRESS NOTE    Patient Name: Omkar Nava  : 1955  MRN: [id number]    Date of Admission: 2023  Primary Care Physician: Deann Cao MD    Subjective   Subjective     CC:  Hospital follow up AMS    HPI:  Sleeping comfortably in bed. No family at bedside. Per nursing staff, daughter called and requested that ativan be discontinued because it makes the patient hallucinate. Overall, he is doing much better.      ROS:  Unable to assess due to mental status.     Objective   Objective     Vital Signs:   Temp:  [96.7 øF (35.9 øC)-98.4 øF (36.9 øC)] 96.7 øF (35.9 øC)  Heart Rate:  [57-81] 75  Resp:  [18] 18  BP: (138-199)/() 162/71     Physical Exam:  Constitutional: No acute distress, asleep but arouses  HENT: NCAT, mucous membranes moist  Respiratory:  respiratory effort normal on room air  Cardiovascular: RRR  Gastrointestinal: nondistended  Musculoskeletal: No bilateral ankle edema  Psychiatric: sleeping comfortably  Neurologic:sleeping comfortably  Skin: No rashes      Results Reviewed:  LAB RESULTS:      Lab 23  0635 23  0424 23  1023 23  1022 23  1231   WBC 6.17 6.41 8.46  --  11.55*   HEMOGLOBIN 7.9* 8.0* 8.9*  --  9.3*   HEMATOCRIT 24.2* 25.1* 27.6*  --  29.6*   PLATELETS 222 205 236  --  256   NEUTROS ABS  --  3.52 5.72  --  9.17*   IMMATURE GRANS (ABS)  --  0.02 0.03  --  0.06*   LYMPHS ABS  --  1.91 1.68  --  1.73   MONOS ABS  --  0.66 0.82  --  0.55   EOS ABS  --  0.28 0.18  --  0.01   MCV 88.3 88.4 87.9  --  88.9   CRP  --   --  0.59*  --   --    PROCALCITONIN  --   --  0.09  --  0.05   LACTATE  --  0.9  --  0.8 1.1         Lab 23  0424 23  1023 23  1231   SODIUM 140 140  140 138   POTASSIUM 4.0 4.2  4.2 5.2   CHLORIDE 105 106  106 103   CO2 25.0 24.0  24.0 22.0   ANION GAP 10.0 10.0  10.0 13.0   BUN 18 20  20 22   CREATININE 1.24 1.31*  1.31* 1.23   EGFR 64.5 60.4   60.4 65.2   GLUCOSE 177* 150*  150* 159*   CALCIUM 8.1* 8.7  8.7 9.3   MAGNESIUM  --   --  1.8         Lab 08/03/23  0424 08/02/23  1023 07/31/23  1231   TOTAL PROTEIN 5.6* 6.4 7.6   ALBUMIN 3.1* 3.2* 3.8   GLOBULIN 2.5 3.2 3.8   ALT (SGPT) 15 19 25   AST (SGOT) 13 17 25   BILIRUBIN <0.2 <0.2 <0.2   ALK PHOS 67 65 75   LIPASE  --   --  19                 Lab 08/05/23  0635   IRON 40*   IRON SATURATION (TSAT) 14*   TIBC 282*   TRANSFERRIN 189*   FERRITIN 136.20   FOLATE 8.68   VITAMIN B 12 507         Brief Urine Lab Results  (Last result in the past 365 days)        Color   Clarity   Blood   Leuk Est   Nitrite   Protein   CREAT   Urine HCG        08/02/23 1319 Yellow   Clear   Negative   Negative   Negative   30 mg/dL (1+)                   Microbiology Results Abnormal       Procedure Component Value - Date/Time    Blood Culture - Blood, Hand, Left [[id number]]  (Normal) Collected: 07/31/23 1246    Lab Status: Final result Specimen: Blood from Hand, Left Updated: 08/05/23 1316     Blood Culture No growth at 5 days    Blood Culture - Blood, Arm, Right [[id number]]  (Normal) Collected: 07/31/23 1246    Lab Status: Final result Specimen: Blood from Arm, Right Updated: 08/05/23 1316     Blood Culture No growth at 5 days    Respiratory Panel PCR w/COVID-19(SARS-CoV-2) GIANNI/SIENNA/ANNA/PAD/COR/MAD/ASHIA In-House, NP Swab in UTM/VTM, 3-4 HR TAT - Swab, Nasopharynx [[id number]]  (Normal) Collected: 07/31/23 1328    Lab Status: Final result Specimen: Swab from Nasopharynx Updated: 07/31/23 1439     ADENOVIRUS, PCR Not Detected     Coronavirus 229E Not Detected     Coronavirus HKU1 Not Detected     Coronavirus NL63 Not Detected     Coronavirus OC43 Not Detected     COVID19 Not Detected     Human Metapneumovirus Not Detected     Human Rhinovirus/Enterovirus Not Detected     Influenza A PCR Not Detected     Influenza B PCR Not Detected     Parainfluenza Virus 1 Not Detected     Parainfluenza Virus 2 Not Detected     Parainfluenza  Virus 3 Not Detected     Parainfluenza Virus 4 Not Detected     RSV, PCR Not Detected     Bordetella pertussis pcr Not Detected     Bordetella parapertussis PCR Not Detected     Chlamydophila pneumoniae PCR Not Detected     Mycoplasma pneumo by PCR Not Detected    Narrative:      In the setting of a positive respiratory panel with a viral infection PLUS a negative procalcitonin without other underlying concern for bacterial infection, consider observing off antibiotics or discontinuation of antibiotics and continue supportive care. If the respiratory panel is positive for atypical bacterial infection (Bordetella pertussis, Chlamydophila pneumoniae, or Mycoplasma pneumoniae), consider antibiotic de-escalation to target atypical bacterial infection.            No radiology results from the last 24 hrs        Current medications:  Scheduled Meds:apixaban, 2.5 mg, Oral, Q12H  brimonidine, 1 drop, Both Eyes, TID   And  timolol, 1 drop, Both Eyes, BID  cefuroxime, 500 mg, Oral, Q12H  divalproex, 250 mg, Oral, BID  doxycycline, 100 mg, Oral, Q12H  famotidine, 20 mg, Oral, BID  ferrous sulfate, 325 mg, Oral, Daily With Breakfast  hydrALAZINE, 25 mg, Oral, Q8H  insulin lispro, 2-7 Units, Subcutaneous, 4x Daily AC & at Bedtime  lisinopril, 40 mg, Oral, Daily  melatonin, 5 mg, Oral, Nightly  povidone-iodine, 1 application , Topical, Daily  senna-docusate sodium, 2 tablet, Oral, BID  sodium chloride, 10 mL, Intravenous, Q12H  thiamine, 100 mg, Oral, TID      Continuous Infusions:     PRN Meds:.  acetaminophen    senna-docusate sodium **AND** polyethylene glycol **AND** bisacodyl **AND** bisacodyl    dextrose    dextrose    glucagon (human recombinant)    hydrALAZINE    hydrOXYzine    ondansetron    [COMPLETED] Insert Peripheral IV **AND** sodium chloride    sodium chloride    sodium chloride    sodium chloride    ziprasidone    Assessment & Plan   Assessment & Plan     Active Hospital Problems    Diagnosis  POA    **AMS  (altered mental status) [R41.82]  Yes    S/P transmetatarsal amputation of foot, left [Z89.432]  Not Applicable    Neurocognitive disorder [R41.9]  Yes    Type 2 diabetes mellitus [E11.9]  Yes    Essential hypertension [I10]  Yes    Psychotic disorder [F29]  Yes      Resolved Hospital Problems   No resolved problems to display.        Brief Hospital Course to date:  Omkar Nava is a 67 y.o. male with PMHx significant for dementia w/psychosis, DMII, HTN, polysubstance abuse, osteomyelitis L foot s/p left transmetatarsal amputation 12/2022 who currently resides in nursing home who presented with AMS and fever.     AMS   Fever/Sepsis - concern for CNS infection  - empirically covered for meningitis initially  - LP attempted in the ER as well as IR without success -mostly due to agitation  - infectious work-up including UA, resp PCR, CT abd/pelvis, CT head, CXR unremarkable for clear source of fever  - could consider partially treated cystitis as etiology, however UA is bland  - ID following s/p IV vanc and rocephin changed to PO doxy and cefuroxime 8/7/2023.  Stopped acyclovir.  D/w Dr. Gordon 8/2/23 who believes patient close to baseline and with difficulties with patient cooperating with LP  ok with not attempting again.      Dementia w/Psychosis:  - continue home oral regimen when okay to take PO - appears he is on depakote, PRN atarax   - will discontinue ativan per family request.  Continue geodon as needed.      DMII  - SSI coverage for now     HTN:  - continue home regimen, PRN IV medications      Expected Discharge Location and Transportation: patient with LTC bed at [place] care and rehab daughter prefers for patient to go somewhere different.  CM following and states that likely need to go back to [place] and try to change facilities from there    Expected Discharge   Expected Discharge Date: 8/5/2023; Expected Discharge Time:      DVT prophylaxis:  Medical DVT prophylaxis orders are present.      AM-PAC 6 Clicks Score (PT): 10 (08/05/23 0822)    CODE STATUS:   Code Status and Medical Interventions:   Ordered at: 07/31/23 1801     Level Of Support Discussed With:    Health Care Surrogate     Code Status (Patient has no pulse and is not breathing):    CPR (Attempt to Resuscitate)     Medical Interventions (Patient has pulse or is breathing):    Full Support     Release to patient:    Routine Release       NARGIS Can  08/05/23

## 2023-08-05 NOTE — PLAN OF CARE
Problem: Adult Inpatient Plan of Care  Goal: Plan of Care Review  Flowsheets (Taken 8/5/2023 7625)  Progress: improving  Plan of Care Reviewed With:   patient   daughter  Outcome Evaluation: Patient A&Ox3, on RA, SA-1st degree AV block on tele, BPs elevated this shift with systolic in 170s and 180s. PRN hydralazine given once. Patient denies pain and soa. Atarax given once. Will continue with POC.   Goal Outcome Evaluation:  Plan of Care Reviewed With: patient, daughter        Progress: improving  Outcome Evaluation: Patient A&Ox3, on RA, SA-1st degree AV block on tele, BPs elevated this shift with systolic in 170s and 180s. PRN hydralazine given once. Patient denies pain and soa. Atarax given once. Will continue with POC.

## 2023-08-05 NOTE — CASE MANAGEMENT/SOCIAL WORK
Continued Stay Note  [facility]     Patient Name: Omkar Nava  MRN: [id number]  Today's Date: 8/5/2023    Admit Date: 7/31/2023    Plan: LTC   Discharge Plan       Row Name 08/05/23 1020       Plan    Plan Comments [facility] CM received a call from Mr Nava's staff RN. RN stated that CM needed to call Mr Nava's daughter, Idalia about placement. CM spoke to Idalia by phone. Idalia does not want her father to return to his current LTC in [place]. CM reviewed weekday CM notes with Idalia and informed her that we cannot make referrals on the weekends. Weekday CM, please reach out to Idalia on Monday to discuss further plans. CM following.    Final Discharge Disposition Code 04 - intermediate care facility                   Discharge Codes    No documentation.                 Expected Discharge Date and Time       Expected Discharge Date Expected Discharge Time    Aug 5, 2023               Lakisha Moctezuma, RN

## 2023-08-06 LAB
GLUCOSE BLDC GLUCOMTR-MCNC: 298 MG/DL (ref 70–130)
GLUCOSE BLDC GLUCOMTR-MCNC: 313 MG/DL (ref 70–130)
GLUCOSE BLDC GLUCOMTR-MCNC: 319 MG/DL (ref 70–130)
GLUCOSE BLDC GLUCOMTR-MCNC: 373 MG/DL (ref 70–130)

## 2023-08-06 PROCEDURE — 63710000001 INSULIN LISPRO (HUMAN) PER 5 UNITS: Performed by: INTERNAL MEDICINE

## 2023-08-06 PROCEDURE — 63710000001 INSULIN DETEMIR PER 5 UNITS: Performed by: NURSE PRACTITIONER

## 2023-08-06 PROCEDURE — 92610 EVALUATE SWALLOWING FUNCTION: CPT

## 2023-08-06 PROCEDURE — 63710000001 INSULIN LISPRO (HUMAN) PER 5 UNITS: Performed by: NURSE PRACTITIONER

## 2023-08-06 PROCEDURE — 82948 REAGENT STRIP/BLOOD GLUCOSE: CPT

## 2023-08-06 PROCEDURE — 99232 SBSQ HOSP IP/OBS MODERATE 35: CPT | Performed by: NURSE PRACTITIONER

## 2023-08-06 RX ORDER — INSULIN LISPRO 100 [IU]/ML
3 INJECTION, SOLUTION INTRAVENOUS; SUBCUTANEOUS
Status: DISCONTINUED | OUTPATIENT
Start: 2023-08-06 | End: 2023-08-07

## 2023-08-06 RX ORDER — HYDRALAZINE HYDROCHLORIDE 50 MG/1
50 TABLET, FILM COATED ORAL EVERY 8 HOURS SCHEDULED
Status: DISCONTINUED | OUTPATIENT
Start: 2023-08-06 | End: 2023-08-07

## 2023-08-06 RX ADMIN — DIVALPROEX SODIUM 250 MG: 250 TABLET, DELAYED RELEASE ORAL at 20:48

## 2023-08-06 RX ADMIN — INSULIN DETEMIR 5 UNITS: 100 INJECTION, SOLUTION SUBCUTANEOUS at 20:49

## 2023-08-06 RX ADMIN — THIAMINE HCL TAB 100 MG 100 MG: 100 TAB at 20:48

## 2023-08-06 RX ADMIN — BRIMONIDINE TARTRATE 1 DROP: 2 SOLUTION OPHTHALMIC at 09:23

## 2023-08-06 RX ADMIN — POVIDONE-IODINE 1 EACH: 10 SOLUTION TOPICAL at 09:21

## 2023-08-06 RX ADMIN — HYDRALAZINE HYDROCHLORIDE 50 MG: 50 TABLET, FILM COATED ORAL at 20:48

## 2023-08-06 RX ADMIN — TIMOLOL MALEATE 1 DROP: 5 SOLUTION/ DROPS OPHTHALMIC at 20:49

## 2023-08-06 RX ADMIN — CEFUROXIME AXETIL 500 MG: 250 TABLET, FILM COATED ORAL at 20:48

## 2023-08-06 RX ADMIN — FERROUS SULFATE TAB 325 MG (65 MG ELEMENTAL FE) 325 MG: 325 (65 FE) TAB at 09:18

## 2023-08-06 RX ADMIN — Medication 5 MG: at 20:48

## 2023-08-06 RX ADMIN — SENNOSIDES AND DOCUSATE SODIUM 2 TABLET: 50; 8.6 TABLET ORAL at 09:18

## 2023-08-06 RX ADMIN — INSULIN LISPRO 4 UNITS: 100 INJECTION, SOLUTION INTRAVENOUS; SUBCUTANEOUS at 12:10

## 2023-08-06 RX ADMIN — HYDRALAZINE HYDROCHLORIDE 50 MG: 50 TABLET, FILM COATED ORAL at 13:21

## 2023-08-06 RX ADMIN — INSULIN LISPRO 3 UNITS: 100 INJECTION, SOLUTION INTRAVENOUS; SUBCUTANEOUS at 17:19

## 2023-08-06 RX ADMIN — CEFUROXIME AXETIL 500 MG: 250 TABLET, FILM COATED ORAL at 09:19

## 2023-08-06 RX ADMIN — APIXABAN 2.5 MG: 2.5 TABLET, FILM COATED ORAL at 09:18

## 2023-08-06 RX ADMIN — LISINOPRIL 40 MG: 40 TABLET ORAL at 09:18

## 2023-08-06 RX ADMIN — DOXYCYCLINE 100 MG: 100 CAPSULE ORAL at 20:48

## 2023-08-06 RX ADMIN — INSULIN LISPRO 5 UNITS: 100 INJECTION, SOLUTION INTRAVENOUS; SUBCUTANEOUS at 09:19

## 2023-08-06 RX ADMIN — DOXYCYCLINE 100 MG: 100 CAPSULE ORAL at 09:19

## 2023-08-06 RX ADMIN — INSULIN LISPRO 6 UNITS: 100 INJECTION, SOLUTION INTRAVENOUS; SUBCUTANEOUS at 20:48

## 2023-08-06 RX ADMIN — FAMOTIDINE 20 MG: 20 TABLET, FILM COATED ORAL at 20:48

## 2023-08-06 RX ADMIN — TIMOLOL MALEATE 1 DROP: 5 SOLUTION/ DROPS OPHTHALMIC at 09:19

## 2023-08-06 RX ADMIN — ACETAMINOPHEN 650 MG: 325 TABLET ORAL at 21:52

## 2023-08-06 RX ADMIN — Medication 10 ML: at 20:49

## 2023-08-06 RX ADMIN — INSULIN LISPRO 3 UNITS: 100 INJECTION, SOLUTION INTRAVENOUS; SUBCUTANEOUS at 12:11

## 2023-08-06 RX ADMIN — FAMOTIDINE 20 MG: 20 TABLET, FILM COATED ORAL at 09:19

## 2023-08-06 RX ADMIN — INSULIN LISPRO 3 UNITS: 100 INJECTION, SOLUTION INTRAVENOUS; SUBCUTANEOUS at 09:20

## 2023-08-06 RX ADMIN — APIXABAN 2.5 MG: 2.5 TABLET, FILM COATED ORAL at 20:48

## 2023-08-06 RX ADMIN — THIAMINE HCL TAB 100 MG 100 MG: 100 TAB at 09:19

## 2023-08-06 RX ADMIN — INSULIN LISPRO 5 UNITS: 100 INJECTION, SOLUTION INTRAVENOUS; SUBCUTANEOUS at 17:18

## 2023-08-06 RX ADMIN — BRIMONIDINE TARTRATE 1 DROP: 2 SOLUTION OPHTHALMIC at 20:49

## 2023-08-06 RX ADMIN — THIAMINE HCL TAB 100 MG 100 MG: 100 TAB at 17:18

## 2023-08-06 RX ADMIN — BRIMONIDINE TARTRATE 1 DROP: 2 SOLUTION OPHTHALMIC at 15:08

## 2023-08-06 RX ADMIN — DIVALPROEX SODIUM 250 MG: 250 TABLET, DELAYED RELEASE ORAL at 09:19

## 2023-08-06 RX ADMIN — HYDRALAZINE HYDROCHLORIDE 25 MG: 25 TABLET, FILM COATED ORAL at 05:06

## 2023-08-06 RX ADMIN — Medication 10 ML: at 09:18

## 2023-08-06 NOTE — PLAN OF CARE
Goal Outcome Evaluation:              Outcome Evaluation: Pt A&Ox2, has been calm and pleasant this shift, SA with 1st degree block, RA, denies any pain.

## 2023-08-06 NOTE — THERAPY EVALUATION
Acute Care - Speech Language Pathology   Swallow Initial Evaluation [facility]  Clinical Swallow Evaluation       Patient Name: Omkar Nava  : 1955  MRN: [id number]  Today's Date: 2023               Admit Date: 2023    Visit Dx:     ICD-10-CM ICD-9-CM   1. Febrile illness  R50.9 780.60   2. 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, without long-term current use of insulin    Orthostatic hypotension    Acute osteomyelitis of left foot    Type 2 diabetes mellitus    Essential hypertension    Psychotic disorder    Neurocognitive disorder    S/P transmetatarsal amputation of foot, left    AMS (altered mental status)     Past Medical History:   Diagnosis Date    Anemia     Dementia     Diabetes mellitus     Dysphagia     GERD (gastroesophageal reflux disease)     History of alcohol abuse     History of cocaine use     History of marijuana use     Hypertension     Osteomyelitis     Poor historian     records obtained from nursing home records & his family    Visual impairment      Past Surgical History:   Procedure Laterality Date    AMPUTATION FOOT Left 10/18/2022    Procedure: PARTIAL FIRST RAY AMPUTATION LEFT;  Surgeon: Yeison Petty MD;  Location: [facility];  Service: Orthopedics;  Laterality: Left;    AMPUTATION FOOT Left 2022    Procedure: Transmetatarsal of Left Foot;  Surgeon: Yeison Petty MD;  Location: [facility];  Service: Orthopedics;  Laterality: Left;    EYE SURGERY         SLP Recommendation and Plan  SLP Swallowing Diagnosis: swallow WFL/no suspected pharyngeal impairment (23 1415)  SLP Diet Recommendation: soft to chew textures, whole, thin liquids (23 1415)  Recommended Precautions and Strategies: general aspiration precautions  (08/06/23 1415)  SLP Rec. for Method of Medication Administration: meds whole, with thin liquids, with puree, as tolerated (08/06/23 1415)     Monitor for Signs of Aspiration: notify SLP if any concerns (08/06/23 1415)     Swallow Criteria for Skilled Therapeutic Interventions Met: no problems identified which require skilled intervention (08/06/23 1415)  Anticipated Discharge Disposition (SLP): No further SLP services warranted (08/06/23 1415)     Therapy Frequency (Swallow): evaluation only (08/06/23 1415)     Oral Care Recommendations: Oral Care BID/PRN, Suction toothbrush (08/06/23 1415)                                      Oral Care Recommendations: Oral Care BID/PRN, Suction toothbrush (08/06/23 1415)    Plan of Care Reviewed With: patient, family      SWALLOW EVALUATION (last 72 hours)       SLP Adult Swallow Evaluation       Row Name 08/06/23 1415                   Rehab Evaluation    Document Type evaluation  -VO        Subjective Information no complaints  -VO        Patient Observations alert;cooperative  -VO        Patient/Family/Caregiver Comments/Observations family present  -VO        Patient Effort good  -VO           General Information    Patient Profile Reviewed yes  -VO        Pertinent History Of Current Problem adm sepsis w/ unclear etiology. dementia, blindness. CXR not concerning for active dz. Had difficulty w/ meat during lunch prompting dysphagia consult.  -VO        Current Method of Nutrition regular textures;thin liquids  -VO        Precautions/Limitations, Vision vision impairment, bilaterally  -VO        Precautions/Limitations, Hearing WFL;for purposes of eval  -VO        Prior Level of Function-Communication cognitive-linguistic impairment  dementia per chart  -VO        Prior Level of Function-Swallowing no diet consistency restrictions  -VO        Plans/Goals Discussed with patient;family;agreed upon  -VO        Barriers to Rehab none identified  -VO        Patient's Goals for  Discharge patient did not state  -VO        Family Goals for Discharge family did not state  -VO           Pain Scale: FACES Pre/Post-Treatment    Pain: FACES Scale, Pretreatment 0-->no hurt  -VO        Posttreatment Pain Rating 0-->no hurt  -VO           Oral Motor Structure and Function    Dentition Assessment missing teeth  missing all of upper teeth and some molars  -VO        Secretion Management WNL/WFL  -VO        Mucosal Quality moist, healthy  -VO        Volitional Swallow WFL  -VO           Oral Musculature and Cranial Nerve Assessment    Oral Motor General Assessment WFL  -VO           General Eating/Swallowing Observations    Respiratory Support Currently in Use room air  -VO        Eating/Swallowing Skills fed by SLP;needed assist  2' vision impairment  -VO        Positioning During Eating upright in bed  -VO        Utensils Used spoon;straw;cup  -VO        Consistencies Trialed thin liquids;pureed;regular textures  -VO           Clinical Swallow Eval    Oral Prep Phase WFL  -VO        Oral Transit WFL  -VO        Oral Residue WFL  -VO        Pharyngeal Phase no overt signs/symptoms of pharyngeal impairment  -VO        Esophageal Phase unremarkable  -VO        Clinical Swallow Evaluation Summary No overt s/sxs aspiration or difficulty w/ thin liquids via cup/str, puree, or cracker. Requires assist to feed 2' vision impairment therefore doing better w/ straw sips and finger foods. Reports texture of meat was difficult to masticate at lunch 2' missing dentition. Adjusting textures to soft/whole, thin liquids. No further SLP services warranted.  -VO           SLP Evaluation Clinical Impression    SLP Swallowing Diagnosis swallow WFL/no suspected pharyngeal impairment  -VO        Functional Impact no impact on function  -VO        Swallow Criteria for Skilled Therapeutic Interventions Met no problems identified which require skilled intervention  -VO           Recommendations    Therapy Frequency (Swallow)  evaluation only  -VO        SLP Diet Recommendation soft to chew textures;whole;thin liquids  -VO        Recommended Precautions and Strategies general aspiration precautions  -VO        Oral Care Recommendations Oral Care BID/PRN;Suction toothbrush  -VO        SLP Rec. for Method of Medication Administration meds whole;with thin liquids;with puree;as tolerated  -VO        Monitor for Signs of Aspiration notify SLP if any concerns  -VO        Anticipated Discharge Disposition (SLP) No further SLP services warranted  -VO                  User Key  (r) = Recorded By, (t) = Taken By, (c) = Cosigned By      Initials Name Effective Dates    VO May Fajardo MA,CCC-SLP 06/16/21 -                     EDUCATION  The patient has been educated in the following areas:   Dysphagia (Swallowing Impairment) Oral Care/Hydration Modified Diet Instruction.              Time Calculation:    Time Calculation- SLP       Row Name 08/06/23 1533             Time Calculation- SLP    SLP Start Time 1415  -VO      SLP Received On 08/06/23  -VO         Untimed Charges    [id number] Eval Oral Pharyng Swallow Minutes 28  -VO         Total Minutes    Untimed Charges Total Minutes 28  -VO       Total Minutes 28  -VO                User Key  (r) = Recorded By, (t) = Taken By, (c) = Cosigned By      Initials Name Provider Type    VO May Fajardo MA,CCC-SLP Speech and Language Pathologist                    Therapy Charges for Today       Code Description Service Date Service Provider Modifiers Qty    [id number] HC ST EVAL ORAL PHARYNG SWALLOW 2 8/6/2023 May Fajardo MA,CCC-SLP GN 1                 May Fajardo MA,CCC-SLP  8/6/2023

## 2023-08-06 NOTE — PLAN OF CARE
Goal Outcome Evaluation:     Pt's VSS, RA, SA-First degree AV block w/ PACs. No complaints from Pt. Pt has been pleasant and cooperative. Continue POC.      Problem: Fall Injury Risk  Goal: Absence of Fall and Fall-Related Injury  Outcome: Ongoing, Progressing  Intervention: Identify and Manage Contributors  Recent Flowsheet Documentation  Taken 8/5/2023 2055 by Corrine Hernandez RN  Medication Review/Management: medications reviewed  Intervention: Promote Injury-Free Environment  Recent Flowsheet Documentation  Taken 8/6/2023 0600 by Corrine Hernandez RN  Safety Promotion/Fall Prevention: safety round/check completed  Taken 8/6/2023 0400 by Corrine Hernandez RN  Safety Promotion/Fall Prevention: safety round/check completed  Taken 8/6/2023 0200 by Corrine Hernandez RN  Safety Promotion/Fall Prevention: safety round/check completed  Taken 8/6/2023 0000 by Mary, Corrine, RN  Safety Promotion/Fall Prevention: safety round/check completed  Taken 8/5/2023 2200 by Corrine Hernandez RN  Safety Promotion/Fall Prevention: safety round/check completed  Taken 8/5/2023 2055 by Mary, Corrine, RN  Safety Promotion/Fall Prevention:   activity supervised   clutter free environment maintained   fall prevention program maintained   nonskid shoes/slippers when out of bed   safety round/check completed   room organization consistent     Problem: Skin Injury Risk Increased  Goal: Skin Health and Integrity  Outcome: Ongoing, Progressing  Intervention: Optimize Skin Protection  Recent Flowsheet Documentation  Taken 8/6/2023 0600 by Corrine Hernandez RN  Pressure Reduction Techniques:   frequent weight shift encouraged   positioned off wounds   pressure points protected  Head of Bed (HOB) Positioning: HOB at 20-30 degrees  Pressure Reduction Devices:   positioning supports utilized   pressure-redistributing mattress utilized  Skin Protection:   adhesive use limited   incontinence pads utilized    tubing/devices free from skin contact  Taken 8/6/2023 0400 by Corrine Hernandez RN  Pressure Reduction Techniques:   positioned off wounds   pressure points protected  Head of Bed (HOB) Positioning: HOB at 20-30 degrees  Pressure Reduction Devices:   pressure-redistributing mattress utilized   positioning supports utilized  Skin Protection:   adhesive use limited   incontinence pads utilized   tubing/devices free from skin contact  Taken 8/6/2023 0200 by Corrine Hernandez RN  Pressure Reduction Techniques:   positioned off wounds   pressure points protected  Head of Bed (HOB) Positioning: HOB at 20-30 degrees  Pressure Reduction Devices:   positioning supports utilized   pressure-redistributing mattress utilized  Skin Protection:   adhesive use limited   incontinence pads utilized   tubing/devices free from skin contact  Taken 8/6/2023 0000 by Corrine Hernandez RN  Pressure Reduction Techniques:   frequent weight shift encouraged   pressure points protected   positioned off wounds  Head of Bed (HOB) Positioning: HOB at 20-30 degrees  Pressure Reduction Devices:   positioning supports utilized   pressure-redistributing mattress utilized  Skin Protection:   adhesive use limited   incontinence pads utilized   tubing/devices free from skin contact  Taken 8/5/2023 2200 by Corrine Hernandez RN  Pressure Reduction Techniques:   frequent weight shift encouraged   positioned off wounds   pressure points protected  Head of Bed (HOB) Positioning: HOB at 20-30 degrees  Pressure Reduction Devices:   pressure-redistributing mattress utilized   positioning supports utilized  Skin Protection:   adhesive use limited   incontinence pads utilized   tubing/devices free from skin contact  Taken 8/5/2023 2055 by Corrine Hernandez RN  Pressure Reduction Techniques:   frequent weight shift encouraged   positioned off wounds   pressure points protected  Head of Bed (HOB) Positioning: HOB at 30-45 degrees  Pressure Reduction  Devices:   positioning supports utilized   pressure-redistributing mattress utilized  Skin Protection:   adhesive use limited   incontinence pads utilized   tubing/devices free from skin contact     Problem: Adult Inpatient Plan of Care  Goal: Plan of Care Review  Outcome: Ongoing, Progressing  Goal: Patient-Specific Goal (Individualized)  Outcome: Ongoing, Progressing  Goal: Absence of Hospital-Acquired Illness or Injury  Outcome: Ongoing, Progressing  Intervention: Identify and Manage Fall Risk  Recent Flowsheet Documentation  Taken 8/6/2023 0600 by Corrine Hernandez RN  Safety Promotion/Fall Prevention: safety round/check completed  Taken 8/6/2023 0400 by Corrine Hernandez RN  Safety Promotion/Fall Prevention: safety round/check completed  Taken 8/6/2023 0200 by Corrine Hernandez RN  Safety Promotion/Fall Prevention: safety round/check completed  Taken 8/6/2023 0000 by Mary, Corrine, RN  Safety Promotion/Fall Prevention: safety round/check completed  Taken 8/5/2023 2200 by Corrine Hernandez RN  Safety Promotion/Fall Prevention: safety round/check completed  Taken 8/5/2023 2055 by Mary, Corrine, RN  Safety Promotion/Fall Prevention:   activity supervised   clutter free environment maintained   fall prevention program maintained   nonskid shoes/slippers when out of bed   safety round/check completed   room organization consistent  Intervention: Prevent Skin Injury  Recent Flowsheet Documentation  Taken 8/6/2023 0600 by Corrine Hernandez RN  Body Position: position changed independently  Skin Protection:   adhesive use limited   incontinence pads utilized   tubing/devices free from skin contact  Taken 8/6/2023 0400 by Corrine Hernandez RN  Body Position: position changed independently  Skin Protection:   adhesive use limited   incontinence pads utilized   tubing/devices free from skin contact  Taken 8/6/2023 0200 by Corrine Hernandez RN  Body Position: position changed  independently  Skin Protection:   adhesive use limited   incontinence pads utilized   tubing/devices free from skin contact  Taken 8/6/2023 0000 by Corrine Hernandez RN  Body Position: position changed independently  Skin Protection:   adhesive use limited   incontinence pads utilized   tubing/devices free from skin contact  Taken 8/5/2023 2200 by Corrine Hernandez RN  Body Position:   turned   left  Skin Protection:   adhesive use limited   incontinence pads utilized   tubing/devices free from skin contact  Taken 8/5/2023 2055 by Corrine Hernandez RN  Body Position: position changed independently  Skin Protection:   adhesive use limited   incontinence pads utilized   tubing/devices free from skin contact  Intervention: Prevent and Manage VTE (Venous Thromboembolism) Risk  Recent Flowsheet Documentation  Taken 8/5/2023 2055 by Corrine Hernandez RN  VTE Prevention/Management: (Eliquis) other (see comments)  Intervention: Prevent Infection  Recent Flowsheet Documentation  Taken 8/6/2023 0600 by Corrine Hernandez RN  Infection Prevention:   cohorting utilized   rest/sleep promoted   single patient room provided  Taken 8/6/2023 0400 by Corrine Hernandez RN  Infection Prevention:   cohorting utilized   rest/sleep promoted   single patient room provided  Taken 8/6/2023 0200 by Corrine Hernandez RN  Infection Prevention:   cohorting utilized   rest/sleep promoted   single patient room provided  Taken 8/6/2023 0000 by Corrine Hernandez RN  Infection Prevention:   cohorting utilized   rest/sleep promoted   single patient room provided  Taken 8/5/2023 2200 by Corrine Hernandez RN  Infection Prevention:   cohorting utilized   rest/sleep promoted   single patient room provided  Taken 8/5/2023 2055 by Corrine Hernandez RN  Infection Prevention:   cohorting utilized   rest/sleep promoted   single patient room provided  Goal: Optimal Comfort and Wellbeing  Outcome: Ongoing, Progressing  Goal:  Readiness for Transition of Care  Outcome: Ongoing, Progressing     Problem: Adjustment to Illness (Sepsis/Septic Shock)  Goal: Optimal Coping  Outcome: Ongoing, Progressing     Problem: Bleeding (Sepsis/Septic Shock)  Goal: Absence of Bleeding  Outcome: Ongoing, Progressing     Problem: Glycemic Control Impaired (Sepsis/Septic Shock)  Goal: Blood Glucose Level Within Desired Range  Outcome: Ongoing, Progressing     Problem: Infection Progression (Sepsis/Septic Shock)  Goal: Absence of Infection Signs and Symptoms  Outcome: Ongoing, Progressing  Intervention: Initiate Sepsis Management  Recent Flowsheet Documentation  Taken 8/6/2023 0600 by Corrine Hernandez RN  Infection Prevention:   cohorting utilized   rest/sleep promoted   single patient room provided  Taken 8/6/2023 0400 by Corrine Hernandez RN  Infection Prevention:   cohorting utilized   rest/sleep promoted   single patient room provided  Taken 8/6/2023 0200 by Corrine Hernandez RN  Infection Prevention:   cohorting utilized   rest/sleep promoted   single patient room provided  Taken 8/6/2023 0000 by Corrine Hernandez RN  Infection Prevention:   cohorting utilized   rest/sleep promoted   single patient room provided  Taken 8/5/2023 2200 by Corrine Hernandez RN  Infection Prevention:   cohorting utilized   rest/sleep promoted   single patient room provided  Taken 8/5/2023 2055 by Corrine Hernandez RN  Infection Prevention:   cohorting utilized   rest/sleep promoted   single patient room provided     Problem: Nutrition Impaired (Sepsis/Septic Shock)  Goal: Optimal Nutrition Intake  Outcome: Ongoing, Progressing     Problem: Confusion Acute  Goal: Optimal Cognitive Function  Outcome: Ongoing, Progressing

## 2023-08-06 NOTE — PLAN OF CARE
Goal Outcome Evaluation:  Plan of Care Reviewed With: patient, family                   SLP evaluation completed. Will sign-off SLP/dysphagia. Please see note for further details and recommendations.

## 2023-08-07 LAB
GLUCOSE BLDC GLUCOMTR-MCNC: 197 MG/DL (ref 70–130)
GLUCOSE BLDC GLUCOMTR-MCNC: 256 MG/DL (ref 70–130)
GLUCOSE BLDC GLUCOMTR-MCNC: 267 MG/DL (ref 70–130)
GLUCOSE BLDC GLUCOMTR-MCNC: 285 MG/DL (ref 70–130)
HCT VFR BLD AUTO: 27.5 % (ref 37.5–51)
HGB BLD-MCNC: 8.8 G/DL (ref 13–17.7)

## 2023-08-07 PROCEDURE — 25010000002 ZIPRASIDONE MESYLATE PER 10 MG: Performed by: INTERNAL MEDICINE

## 2023-08-07 PROCEDURE — 85014 HEMATOCRIT: CPT | Performed by: NURSE PRACTITIONER

## 2023-08-07 PROCEDURE — 82948 REAGENT STRIP/BLOOD GLUCOSE: CPT

## 2023-08-07 PROCEDURE — 63710000001 INSULIN DETEMIR PER 5 UNITS: Performed by: NURSE PRACTITIONER

## 2023-08-07 PROCEDURE — 63710000001 INSULIN LISPRO (HUMAN) PER 5 UNITS: Performed by: INTERNAL MEDICINE

## 2023-08-07 PROCEDURE — 63710000001 INSULIN LISPRO (HUMAN) PER 5 UNITS: Performed by: NURSE PRACTITIONER

## 2023-08-07 PROCEDURE — 99232 SBSQ HOSP IP/OBS MODERATE 35: CPT | Performed by: NURSE PRACTITIONER

## 2023-08-07 PROCEDURE — 97530 THERAPEUTIC ACTIVITIES: CPT

## 2023-08-07 PROCEDURE — 85018 HEMOGLOBIN: CPT | Performed by: NURSE PRACTITIONER

## 2023-08-07 PROCEDURE — 97110 THERAPEUTIC EXERCISES: CPT

## 2023-08-07 PROCEDURE — 25010000002 LORAZEPAM PER 2 MG: Performed by: INTERNAL MEDICINE

## 2023-08-07 RX ORDER — LORAZEPAM 2 MG/ML
0.5 INJECTION INTRAMUSCULAR ONCE
Status: COMPLETED | OUTPATIENT
Start: 2023-08-07 | End: 2023-08-07

## 2023-08-07 RX ORDER — LORAZEPAM 0.5 MG/1
0.5 TABLET ORAL ONCE
Status: DISCONTINUED | OUTPATIENT
Start: 2023-08-07 | End: 2023-08-07

## 2023-08-07 RX ORDER — INSULIN LISPRO 100 [IU]/ML
5 INJECTION, SOLUTION INTRAVENOUS; SUBCUTANEOUS
Status: DISCONTINUED | OUTPATIENT
Start: 2023-08-07 | End: 2023-08-09 | Stop reason: HOSPADM

## 2023-08-07 RX ORDER — ASCORBIC ACID 500 MG
500 TABLET ORAL DAILY
Status: DISCONTINUED | OUTPATIENT
Start: 2023-08-07 | End: 2023-08-09 | Stop reason: HOSPADM

## 2023-08-07 RX ADMIN — DOXYCYCLINE 100 MG: 100 CAPSULE ORAL at 08:23

## 2023-08-07 RX ADMIN — FERROUS SULFATE TAB 325 MG (65 MG ELEMENTAL FE) 325 MG: 325 (65 FE) TAB at 08:24

## 2023-08-07 RX ADMIN — ZIPRASIDONE MESYLATE 10 MG: 20 INJECTION, POWDER, LYOPHILIZED, FOR SOLUTION INTRAMUSCULAR at 18:01

## 2023-08-07 RX ADMIN — INSULIN LISPRO 5 UNITS: 100 INJECTION, SOLUTION INTRAVENOUS; SUBCUTANEOUS at 08:24

## 2023-08-07 RX ADMIN — DIVALPROEX SODIUM 250 MG: 250 TABLET, DELAYED RELEASE ORAL at 08:24

## 2023-08-07 RX ADMIN — CEFUROXIME AXETIL 500 MG: 250 TABLET, FILM COATED ORAL at 08:23

## 2023-08-07 RX ADMIN — HYDROXYZINE HYDROCHLORIDE 50 MG: 25 TABLET, FILM COATED ORAL at 17:22

## 2023-08-07 RX ADMIN — INSULIN DETEMIR 5 UNITS: 100 INJECTION, SOLUTION SUBCUTANEOUS at 12:52

## 2023-08-07 RX ADMIN — HYDRALAZINE HYDROCHLORIDE 75 MG: 50 TABLET, FILM COATED ORAL at 20:09

## 2023-08-07 RX ADMIN — DOXYCYCLINE 100 MG: 100 CAPSULE ORAL at 20:09

## 2023-08-07 RX ADMIN — Medication 10 ML: at 08:30

## 2023-08-07 RX ADMIN — Medication 5 MG: at 20:09

## 2023-08-07 RX ADMIN — OXYCODONE HYDROCHLORIDE AND ACETAMINOPHEN 500 MG: 500 TABLET ORAL at 14:31

## 2023-08-07 RX ADMIN — Medication 10 ML: at 20:27

## 2023-08-07 RX ADMIN — APIXABAN 2.5 MG: 2.5 TABLET, FILM COATED ORAL at 20:08

## 2023-08-07 RX ADMIN — INSULIN LISPRO 4 UNITS: 100 INJECTION, SOLUTION INTRAVENOUS; SUBCUTANEOUS at 08:24

## 2023-08-07 RX ADMIN — HYDROXYZINE HYDROCHLORIDE 50 MG: 25 TABLET, FILM COATED ORAL at 11:28

## 2023-08-07 RX ADMIN — BRIMONIDINE TARTRATE 1 DROP: 2 SOLUTION OPHTHALMIC at 08:28

## 2023-08-07 RX ADMIN — THIAMINE HCL TAB 100 MG 100 MG: 100 TAB at 16:15

## 2023-08-07 RX ADMIN — CEFUROXIME AXETIL 500 MG: 250 TABLET, FILM COATED ORAL at 20:09

## 2023-08-07 RX ADMIN — INSULIN LISPRO 5 UNITS: 100 INJECTION, SOLUTION INTRAVENOUS; SUBCUTANEOUS at 12:51

## 2023-08-07 RX ADMIN — INSULIN LISPRO 2 UNITS: 100 INJECTION, SOLUTION INTRAVENOUS; SUBCUTANEOUS at 12:51

## 2023-08-07 RX ADMIN — LORAZEPAM 0.5 MG: 2 INJECTION INTRAMUSCULAR; INTRAVENOUS at 20:27

## 2023-08-07 RX ADMIN — THIAMINE HCL TAB 100 MG 100 MG: 100 TAB at 20:09

## 2023-08-07 RX ADMIN — DIVALPROEX SODIUM 250 MG: 250 TABLET, DELAYED RELEASE ORAL at 20:10

## 2023-08-07 RX ADMIN — LISINOPRIL 40 MG: 40 TABLET ORAL at 08:24

## 2023-08-07 RX ADMIN — POVIDONE-IODINE 1 EACH: 10 SOLUTION TOPICAL at 10:23

## 2023-08-07 RX ADMIN — FAMOTIDINE 20 MG: 20 TABLET, FILM COATED ORAL at 20:09

## 2023-08-07 RX ADMIN — HYDRALAZINE HYDROCHLORIDE 50 MG: 50 TABLET, FILM COATED ORAL at 05:59

## 2023-08-07 RX ADMIN — FAMOTIDINE 20 MG: 20 TABLET, FILM COATED ORAL at 08:24

## 2023-08-07 RX ADMIN — INSULIN LISPRO 4 UNITS: 100 INJECTION, SOLUTION INTRAVENOUS; SUBCUTANEOUS at 20:27

## 2023-08-07 RX ADMIN — TIMOLOL MALEATE 1 DROP: 5 SOLUTION/ DROPS OPHTHALMIC at 08:29

## 2023-08-07 RX ADMIN — APIXABAN 2.5 MG: 2.5 TABLET, FILM COATED ORAL at 08:23

## 2023-08-07 RX ADMIN — INSULIN DETEMIR 5 UNITS: 100 INJECTION, SOLUTION SUBCUTANEOUS at 20:32

## 2023-08-07 RX ADMIN — HYDRALAZINE HYDROCHLORIDE 75 MG: 50 TABLET, FILM COATED ORAL at 14:32

## 2023-08-07 RX ADMIN — THIAMINE HCL TAB 100 MG 100 MG: 100 TAB at 08:24

## 2023-08-07 NOTE — PLAN OF CARE
Goal Outcome Evaluation:   Pt agitated, grabbing at staff, uncooperative with cares, Geodon given per prn order.  Problem: Fall Injury Risk  Goal: Absence of Fall and Fall-Related Injury  Outcome: Ongoing, Progressing  Intervention: Identify and Manage Contributors  Recent Flowsheet Documentation  Taken 8/7/2023 1600 by Chuyita Figueroa RN  Medication Review/Management: medications reviewed  Taken 8/7/2023 1400 by Chuyita Figueroa RN  Medication Review/Management: medications reviewed  Taken 8/7/2023 1200 by Chuyita Figueroa RN  Medication Review/Management: medications reviewed  Taken 8/7/2023 0830 by Chuyita Figueroa RN  Medication Review/Management: medications reviewed  Intervention: Promote Injury-Free Environment  Recent Flowsheet Documentation  Taken 8/7/2023 1600 by Chuyita Figueroa RN  Safety Promotion/Fall Prevention:   activity supervised   assistive device/personal items within reach   clutter free environment maintained   elopement precautions   fall prevention program maintained   gait belt   lighting adjusted   mobility aid in reach   muscle strengthening facilitated   nonskid shoes/slippers when out of bed   safety round/check completed   room organization consistent  Taken 8/7/2023 1400 by Chuyita Figueroa RN  Safety Promotion/Fall Prevention:   activity supervised   assistive device/personal items within reach   clutter free environment maintained   elopement precautions   fall prevention program maintained   gait belt   lighting adjusted   mobility aid in reach   muscle strengthening facilitated   nonskid shoes/slippers when out of bed   room organization consistent   safety round/check completed  Taken 8/7/2023 1200 by Chuyita Figueroa RN  Safety Promotion/Fall Prevention:   activity supervised   assistive device/personal items within reach   clutter free environment maintained   fall prevention program maintained   elopement precautions   gait belt   lighting adjusted   mobility aid in reach   muscle  strengthening facilitated   nonskid shoes/slippers when out of bed   room organization consistent   safety round/check completed  Taken 8/7/2023 0830 by Chuyita Figueroa RN  Safety Promotion/Fall Prevention:   activity supervised   assistive device/personal items within reach   clutter free environment maintained   elopement precautions   fall prevention program maintained   gait belt   lighting adjusted   mobility aid in reach   muscle strengthening facilitated   nonskid shoes/slippers when out of bed   room organization consistent   safety round/check completed     Problem: Fall Injury Risk  Goal: Absence of Fall and Fall-Related Injury  Intervention: Promote Injury-Free Environment  Recent Flowsheet Documentation  Taken 8/7/2023 1600 by Chuyita Figueroa RN  Safety Promotion/Fall Prevention:   activity supervised   assistive device/personal items within reach   clutter free environment maintained   elopement precautions   fall prevention program maintained   gait belt   lighting adjusted   mobility aid in reach   muscle strengthening facilitated   nonskid shoes/slippers when out of bed   safety round/check completed   room organization consistent  Taken 8/7/2023 1400 by Chuyita Figueroa RN  Safety Promotion/Fall Prevention:   activity supervised   assistive device/personal items within reach   clutter free environment maintained   elopement precautions   fall prevention program maintained   gait belt   lighting adjusted   mobility aid in reach   muscle strengthening facilitated   nonskid shoes/slippers when out of bed   room organization consistent   safety round/check completed  Taken 8/7/2023 1200 by Chuyita Figueroa RN  Safety Promotion/Fall Prevention:   activity supervised   assistive device/personal items within reach   clutter free environment maintained   fall prevention program maintained   elopement precautions   gait belt   lighting adjusted   mobility aid in reach   muscle strengthening facilitated    nonskid shoes/slippers when out of bed   room organization consistent   safety round/check completed  Taken 8/7/2023 0830 by Chuyita Figueroa RN  Safety Promotion/Fall Prevention:   activity supervised   assistive device/personal items within reach   clutter free environment maintained   elopement precautions   fall prevention program maintained   gait belt   lighting adjusted   mobility aid in reach   muscle strengthening facilitated   nonskid shoes/slippers when out of bed   room organization consistent   safety round/check completed

## 2023-08-07 NOTE — PROGRESS NOTES
[facility] Medicine Services  PROGRESS NOTE    Patient Name: Omkar Nava  : 1955  MRN: [id number]    Date of Admission: 2023  Primary Care Physician: Deann Cao MD    Subjective   Subjective     CC:  F/u AMS     HPI:  Patient is resting in bed in NAD. He denies any pain. No acute events overnight per nursing. No change from yesterday exam. Talked with daughter by phone and updated her     ROS:  Unable to reliably assess due to mental status      Objective   Objective     Vital Signs:   Temp:  [96.2 øF (35.7 øC)-98.1 øF (36.7 øC)] 97.2 øF (36.2 øC)  Heart Rate:  [66-78] 69  Resp:  [18] 18  BP: (159-187)/(65-89) 177/78     Physical Exam:  Constitutional: No acute distress, awake, alert  HENT: NCAT, mucous membranes moist  Respiratory: Clear to auscultation bilaterally, respiratory effort normal room air   Cardiovascular: RRR, no murmurs, rubs, or gallops  Gastrointestinal: Positive bowel sounds, soft, nontender, nondistended  Musculoskeletal: No bilateral ankle edema  Psychiatric: Appropriate affect, cooperative  Neurologic: Oriented x 1-2, strength symmetric in all extremities, Cranial Nerves grossly intact to confrontation, speech clear  Skin: No rashes      Results Reviewed:  LAB RESULTS:      Lab 23  0635 23  0424 23  1023 23  1022   WBC 6.17 6.41 8.46  --    HEMOGLOBIN 7.9* 8.0* 8.9*  --    HEMATOCRIT 24.2* 25.1* 27.6*  --    PLATELETS 222 205 236  --    NEUTROS ABS  --  3.52 5.72  --    IMMATURE GRANS (ABS)  --  0.02 0.03  --    LYMPHS ABS  --  1.91 1.68  --    MONOS ABS  --  0.66 0.82  --    EOS ABS  --  0.28 0.18  --    MCV 88.3 88.4 87.9  --    CRP  --   --  0.59*  --    PROCALCITONIN  --   --  0.09  --    LACTATE  --  0.9  --  0.8         Lab 23  0424 23  1023   SODIUM 140 140  140   POTASSIUM 4.0 4.2  4.2   CHLORIDE 105 106  106   CO2 25.0 24.0  24.0   ANION GAP 10.0 10.0  10.0   BUN 18 20  20   CREATININE 1.24  1.31*  1.31*   EGFR 64.5 60.4  60.4   GLUCOSE 177* 150*  150*   CALCIUM 8.1* 8.7  8.7         Lab 08/03/23  0424 08/02/23  1023   TOTAL PROTEIN 5.6* 6.4   ALBUMIN 3.1* 3.2*   GLOBULIN 2.5 3.2   ALT (SGPT) 15 19   AST (SGOT) 13 17   BILIRUBIN <0.2 <0.2   ALK PHOS 67 65                 Lab 08/05/23  0635   IRON 40*   IRON SATURATION (TSAT) 14*   TIBC 282*   TRANSFERRIN 189*   FERRITIN 136.20   FOLATE 8.68   VITAMIN B 12 507         Brief Urine Lab Results  (Last result in the past 365 days)        Color   Clarity   Blood   Leuk Est   Nitrite   Protein   CREAT   Urine HCG        08/02/23 1319 Yellow   Clear   Negative   Negative   Negative   30 mg/dL (1+)                   Microbiology Results Abnormal       Procedure Component Value - Date/Time    Blood Culture - Blood, Hand, Left [[id number]]  (Normal) Collected: 07/31/23 1246    Lab Status: Final result Specimen: Blood from Hand, Left Updated: 08/05/23 1316     Blood Culture No growth at 5 days    Blood Culture - Blood, Arm, Right [[id number]]  (Normal) Collected: 07/31/23 1246    Lab Status: Final result Specimen: Blood from Arm, Right Updated: 08/05/23 1316     Blood Culture No growth at 5 days    Respiratory Panel PCR w/COVID-19(SARS-CoV-2) GIANNI/SIENNA/ANNA/PAD/COR/MAD/ASHIA In-House, NP Swab in UTM/VTM, 3-4 HR TAT - Swab, Nasopharynx [[id number]]  (Normal) Collected: 07/31/23 1328    Lab Status: Final result Specimen: Swab from Nasopharynx Updated: 07/31/23 1439     ADENOVIRUS, PCR Not Detected     Coronavirus 229E Not Detected     Coronavirus HKU1 Not Detected     Coronavirus NL63 Not Detected     Coronavirus OC43 Not Detected     COVID19 Not Detected     Human Metapneumovirus Not Detected     Human Rhinovirus/Enterovirus Not Detected     Influenza A PCR Not Detected     Influenza B PCR Not Detected     Parainfluenza Virus 1 Not Detected     Parainfluenza Virus 2 Not Detected     Parainfluenza Virus 3 Not Detected     Parainfluenza Virus 4 Not Detected     RSV, PCR  Not Detected     Bordetella pertussis pcr Not Detected     Bordetella parapertussis PCR Not Detected     Chlamydophila pneumoniae PCR Not Detected     Mycoplasma pneumo by PCR Not Detected    Narrative:      In the setting of a positive respiratory panel with a viral infection PLUS a negative procalcitonin without other underlying concern for bacterial infection, consider observing off antibiotics or discontinuation of antibiotics and continue supportive care. If the respiratory panel is positive for atypical bacterial infection (Bordetella pertussis, Chlamydophila pneumoniae, or Mycoplasma pneumoniae), consider antibiotic de-escalation to target atypical bacterial infection.            No radiology results from the last 24 hrs        Current medications:  Scheduled Meds:apixaban, 2.5 mg, Oral, Q12H  brimonidine, 1 drop, Both Eyes, TID   And  timolol, 1 drop, Both Eyes, BID  cefuroxime, 500 mg, Oral, Q12H  divalproex, 250 mg, Oral, BID  doxycycline, 100 mg, Oral, Q12H  famotidine, 20 mg, Oral, BID  ferrous sulfate, 325 mg, Oral, Daily With Breakfast  hydrALAZINE, 75 mg, Oral, Q8H  insulin detemir, 5 Units, Subcutaneous, Q12H  insulin lispro, 2-7 Units, Subcutaneous, 4x Daily AC & at Bedtime  Insulin Lispro, 5 Units, Subcutaneous, TID With Meals  lisinopril, 40 mg, Oral, Daily  melatonin, 5 mg, Oral, Nightly  povidone-iodine, 1 application , Topical, Daily  senna-docusate sodium, 2 tablet, Oral, BID  sodium chloride, 10 mL, Intravenous, Q12H  thiamine, 100 mg, Oral, TID      Continuous Infusions:   PRN Meds:.  acetaminophen    senna-docusate sodium **AND** polyethylene glycol **AND** bisacodyl **AND** bisacodyl    dextrose    dextrose    glucagon (human recombinant)    hydrALAZINE    hydrOXYzine    ondansetron    [COMPLETED] Insert Peripheral IV **AND** sodium chloride    sodium chloride    sodium chloride    sodium chloride    ziprasidone    Assessment & Plan   Assessment & Plan     Active Hospital Problems     Diagnosis  POA    **AMS (altered mental status) [R41.82]  Yes    S/P transmetatarsal amputation of foot, left [Z89.432]  Not Applicable    Neurocognitive disorder [R41.9]  Yes    Type 2 diabetes mellitus [E11.9]  Yes    Essential hypertension [I10]  Yes    Psychotic disorder [F29]  Yes      Resolved Hospital Problems   No resolved problems to display.        Brief Hospital Course to date:  Omkar Nava is a 67 y.o. male with PMHx significant for dementia w/psychosis, DMII, HTN, polysubstance abuse, osteomyelitis L foot s/p left transmetatarsal amputation 12/2022 who currently resides in nursing home who presented with AMS and fever.    Plan was partially entered by my partner and I have reviewed and updated as appropriate on 8/7/23     AMS   Fever/Sepsis - concern for CNS infection  - empirically covered for meningitis initially  - LP attempted in the ER as well as IR without success -mostly due to agitation  - infectious work-up including UA, resp PCR, CT abd/pelvis, CT head, CXR unremarkable for clear source of fever  - could consider partially treated cystitis as etiology, however UA is bland  - ID following s/p IV vanc and rocephin changed to PO doxy and cefuroxime 8/7/2023.  Stopped acyclovir.  prior provider D/w Dr. Gordon 8/2/23 who believes patient close to baseline and with difficulties with patient cooperating with LP  ok with not attempting again.      Dementia w/Psychosis:  -- Depakote, PRN atarax   - will discontinue ativan per family request.  Continue geodon as needed has not had a dose     Iron Def anemia  -- cont oral iron and vitamin C  -- baseline hgb 7-9  -- recheck H/H today      DMII  - SSI coverage for now  -- add basal and bolus      HTN:  - continue home regimen, PRN IV medications  -- increase hydralazine again today        Expected Discharge Location and Transportation: SNF, daughter would like another facility  Expected Discharge 8/8  Expected Discharge Date: 8/7/2023; Expected  Discharge Time:      DVT prophylaxis:  Medical DVT prophylaxis orders are present.     AM-PAC 6 Clicks Score (PT): 10 (08/05/23 0822)    CODE STATUS:   Code Status and Medical Interventions:   Ordered at: 07/31/23 1801     Level Of Support Discussed With:    Health Care Surrogate     Code Status (Patient has no pulse and is not breathing):    CPR (Attempt to Resuscitate)     Medical Interventions (Patient has pulse or is breathing):    Full Support     Release to patient:    Routine Release       Chely Lion, NARGIS  08/07/23

## 2023-08-07 NOTE — DISCHARGE PLACEMENT REQUEST
"Prema Sung RALF (67 y.o. Male)       Date of Birth   1955    Social Security Number       Address   [address] [place] [id number]    Home Phone   [phone]    MRN   [id number]       [employer]   None    Marital Status   Single                            Admission Date   7/31/23    Admission Type   Emergency    Admitting Provider   Estela Dos Santos MD    Attending Provider   Estela Dos Santos MD    Department, Room/Bed   [facility] 6B, N639/1       Discharge Date       Discharge Disposition       Discharge Destination                                 Attending Provider: Estela Dos Santos MD    Allergies: No Known Allergies    Isolation: None   Infection: MRSA (07/31/23)   Code Status: CPR    Ht: 152.4 cm (60\")   Wt: 59.4 kg (131 lb)    Admission Cmt: None   Principal Problem: AMS (altered mental status) [R41.82]                   Active Insurance as of 7/31/2023       Primary Coverage       Payor Plan Insurance Group Employer/Plan Group    MEDICARE MEDICARE A & B        Payor Plan Address Payor Plan Phone Number Payor Plan Fax Number Effective Dates    [address] [phone]  8/1/2022 - None Entered    [facility] [id number]         Subscriber Name Subscriber Birth Date Member ID       PREMA SUNG 1955 [id number]               Secondary Coverage       Payor Plan Insurance Group Employer/Plan Group    [place]        Payor Plan Address Payor Plan Phone Number Payor Plan Fax Number Effective Dates    [address] [phone]  3/6/2023 - None Entered    [place] [address]         Subscriber Name Subscriber Birth Date Member ID       ANA LILIAPREMA RALF 1955 [id number]                     Emergency Contacts        (Rel.) Home Phone Work Phone Mobile Phone    Idalia Lerma (Daughter) -- -- [phone]    Thang Horton (Sister) [phone] -- --                 History & Physical        Sarah Gann DO at 07/31/23 1844        "       [facility] Medicine Services  HISTORY AND PHYSICAL    Patient Name: Omkar Nava  : 1955  MRN: [id number]  Primary Care Physician: Deann Cao MD  Date of admission: 2023      Subjective  Subjective     Chief Complaint:  AMS    HPI:  Omkar Nava is a 67 y.o. male with PMHx significant for dementia w/psychosis, DMII, HTN, polysubstance abuse, osteomyelitis L foot s/p left transmetatarsal amputation 2022 who currently resides in nursing home. Daughter brought patient into the ED due to change in mental status. She reports patient has been doing really well lately, has been pleasant, conversant. At baseline he is able to recognize her, he knows the day/month, place ect. This week he developed UTI type symptoms and was put on a short course of ciprofloxacin about 3 days ago, however daughter states she was concerned as his mental status and fever continued to worsen. She states he complained of his head hurting, denied neck pain. On arrival to the ED he was febrile to 103. His work-up including CT scan were unremarkable with no clear source. LP was attempted in both the ED and IR, however was unsuccessful due to agitation, despite sedating medications.     Review of Systems   UTO, secondary to mental status    Personal History     Past Medical History:   Diagnosis Date    Anemia     Dementia     Diabetes mellitus     Dysphagia     GERD (gastroesophageal reflux disease)     History of alcohol abuse     History of cocaine use     History of marijuana use     Hypertension     Osteomyelitis     Poor historian     records obtained from nursing home records & his family    Visual impairment              Past Surgical History:   Procedure Laterality Date    AMPUTATION FOOT Left 10/18/2022    Procedure: PARTIAL FIRST RAY AMPUTATION LEFT;  Surgeon: Yeison Petty MD;  Location: [facility];  Service: Orthopedics;  Laterality: Left;    AMPUTATION FOOT Left 2022     "Procedure: Transmetatarsal of Left Foot;  Surgeon: Yeison Petty MD;  Location: [facility];  Service: Orthopedics;  Laterality: Left;    EYE SURGERY         Family History: family history includes Diabetes in his brother, brother, father, maternal grandfather, maternal grandmother, mother, and sister; Hypertension in his brother, brother, father, and mother.     Social History:  reports that he quit smoking about 6 years ago. His smoking use included cigarettes. He has a 40.00 pack-year smoking history. He has never used smokeless tobacco. He reports that he does not currently use alcohol. He reports current drug use. Drugs: Marijuana and \"Crack\" cocaine.  Social History     Social History Narrative    Caffeine 0-1 servings per day    Patient lives at home .       Medications:  Available home medication information reviewed.  Medications Prior to Admission   Medication Sig Dispense Refill Last Dose    acetaminophen (TYLENOL) 325 MG tablet Take 2 tablets by mouth Every 6 (Six) Hours As Needed for Mild Pain, Headache or Fever.   7/30/2023    albuterol sulfate  (90 Base) MCG/ACT inhaler Inhale 2 puffs Every 4 (Four) Hours As Needed for Wheezing or Shortness of Air.   Past Month    apixaban (ELIQUIS) 2.5 MG tablet tablet Take 1 tablet by mouth 2 (Two) Times a Day. VTE ppx   7/31/2023    atorvastatin (LIPITOR) 20 MG tablet Take 1 tablet by mouth Every Night.   7/30/2023    brimonidine-timolol (COMBIGAN) 0.2-0.5 % ophthalmic solution Administer 1 drop to both eyes 2 (Two) Times a Day.   7/30/2023    Cholecalciferol 50 MCG (2000 UT) capsule Take 3 capsules by mouth Every Morning.   7/30/2023    difluprednate (DUREZOL) 0.05 % ophthalmic emulsion Administer 1 drop into the left eye 4 (Four) Times a Day.   7/30/2023    divalproex (DEPAKOTE) 125 MG DR tablet Take 2 tablets by mouth 2 (Two) Times a Day.   7/30/2023    Emollient (Aquaphilic) ointment Apply 1 application  topically to the appropriate area as directed 2 " (Two) Times a Day. Apply to scalp, lower back, arms, legs, for dry skin   7/30/2023    famotidine (PEPCID) 20 MG tablet Take 1 tablet by mouth 2 (Two) Times a Day.   7/30/2023    ferrous sulfate 325 (65 FE) MG tablet Take 1 tablet by mouth Daily With Breakfast.   7/30/2023    furosemide (LASIX) 20 MG tablet Take 2 tablets by mouth Daily.   7/31/2023    hydrALAZINE (APRESOLINE) 25 MG tablet Take 1 tablet by mouth Every 8 (Eight) Hours. 90 tablet 0 7/30/2023    hydrOXYzine (ATARAX) 50 MG tablet Take 1 tablet by mouth Every 6 (Six) Hours As Needed for Anxiety.   Past Week    insulin glargine (LANTUS, SEMGLEE) 100 UNIT/ML injection Inject 6 Units under the skin into the appropriate area as directed Every Night.   7/30/2023    lisinopril (PRINIVIL,ZESTRIL) 40 MG tablet Take 1 tablet by mouth Daily.   7/31/2023    LORazepam (ATIVAN) 0.5 MG tablet Take 1 tablet by mouth every night at bedtime.   7/30/2023    melatonin 5 MG tablet tablet Take 1 tablet by mouth Every Night.   7/30/2023    metFORMIN (GLUCOPHAGE) 1000 MG tablet Take 1 tablet by mouth 2 (Two) Times a Day With Meals. (Patient taking differently: Take 0.5 tablets by mouth 3 (Three) Times a Day With Meals.)   7/30/2023    thiamine (VITAMIN B-1) 100 MG tablet  tablet Take 1 tablet by mouth 3 (Three) Times a Day.   7/30/2023    cloNIDine (CATAPRES) 0.1 MG tablet Take 1 tablet by mouth Every 6 (Six) Hours As Needed for High Blood Pressure. SBP >160   Unknown at [facility]    Glucagon HCl 1 MG reconstituted solution Inject 1 mg into the appropriate muscle as directed by prescriber As Needed.   Unknown    Nepafenac 0.3 % suspension Administer 1 drop into the left eye every night at bedtime. Start on 8-9-23 for 2 days   Unknown       No Known Allergies    Objective  Objective     Vital Signs:   Temp:  [98.4 øF (36.9 øC)-103.6 øF (39.8 øC)] 99 øF (37.2 øC)  Heart Rate:  [] 89  Resp:  [18-20] 20  BP: (141-176)/() 141/72  Flow (L/min):  [2] 2       Physical Exam    Constitutional: Awake, alert  Eyes: PERRLA, sclerae anicteric  HENT: NCAT  Neck: Supple, no thyromegaly, no lymphadenopathy, trachea midline  Respiratory: Clear to auscultation bilaterally, nonlabored respirations   Cardiovascular: RRR, no murmurs, rubs, or gallops, palpable pedal pulses bilaterally  Gastrointestinal: soft, nontender, nondistended  Musculoskeletal: No bilateral ankle edema  Psychiatric: Appropriate affect, cooperative  Neurologic: confused, UTO strength assessment  Skin: No rashes      Result Review:  I have personally reviewed the results from the time of this admission to 7/31/2023 18:44 EDT and agree with these findings:  [x]  Laboratory list / accordion  [x]  Microbiology  [x]  Radiology  [x]  EKG/Telemetry   [x]  Cardiology/Vascular   [x]  Pathology  []  Old records  []  Other:  Most notable findings include:         LAB RESULTS:      Lab 07/31/23  1231   WBC 11.55*   HEMOGLOBIN 9.3*   HEMATOCRIT 29.6*   PLATELETS 256   NEUTROS ABS 9.17*   IMMATURE GRANS (ABS) 0.06*   LYMPHS ABS 1.73   MONOS ABS 0.55   EOS ABS 0.01   MCV 88.9   PROCALCITONIN 0.05   LACTATE 1.1         Lab 07/31/23  1231   SODIUM 138   POTASSIUM 5.2   CHLORIDE 103   CO2 22.0   ANION GAP 13.0   BUN 22   CREATININE 1.23   EGFR 65.2   GLUCOSE 159*   CALCIUM 9.3   MAGNESIUM 1.8         Lab 07/31/23  1231   TOTAL PROTEIN 7.6   ALBUMIN 3.8   GLOBULIN 3.8   ALT (SGPT) 25   AST (SGOT) 25   BILIRUBIN <0.2   ALK PHOS 75   LIPASE 19                     UA          7/31/2023    12:30   Urinalysis   Squamous Epithelial Cells, UA 0-2    Specific [place], UA 1.015    Ketones, UA Negative    Blood, UA Small (1+)    Leukocytes, UA Negative    Nitrite, UA Negative    RBC, UA Too Numerous to Count    WBC, UA 0-2    Bacteria, UA None Seen        Microbiology Results (last 10 days)       Procedure Component Value - Date/Time    Respiratory Panel PCR w/COVID-19(SARS-CoV-2) GIANNI/SIENNA/ANNA/PAD/COR/MAD/ASHIA In-House, NP Swab in UTM/VTM, 3-4 HR TAT - Swab,  Nasopharynx [[id number]]  (Normal) Collected: 07/31/23 1328    Lab Status: Final result Specimen: Swab from Nasopharynx Updated: 07/31/23 1439     ADENOVIRUS, PCR Not Detected     Coronavirus 229E Not Detected     Coronavirus HKU1 Not Detected     Coronavirus NL63 Not Detected     Coronavirus OC43 Not Detected     COVID19 Not Detected     Human Metapneumovirus Not Detected     Human Rhinovirus/Enterovirus Not Detected     Influenza A PCR Not Detected     Influenza B PCR Not Detected     Parainfluenza Virus 1 Not Detected     Parainfluenza Virus 2 Not Detected     Parainfluenza Virus 3 Not Detected     Parainfluenza Virus 4 Not Detected     RSV, PCR Not Detected     Bordetella pertussis pcr Not Detected     Bordetella parapertussis PCR Not Detected     Chlamydophila pneumoniae PCR Not Detected     Mycoplasma pneumo by PCR Not Detected    Narrative:      In the setting of a positive respiratory panel with a viral infection PLUS a negative procalcitonin without other underlying concern for bacterial infection, consider observing off antibiotics or discontinuation of antibiotics and continue supportive care. If the respiratory panel is positive for atypical bacterial infection (Bordetella pertussis, Chlamydophila pneumoniae, or Mycoplasma pneumoniae), consider antibiotic de-escalation to target atypical bacterial infection.            CT Head Without Contrast    Result Date: 7/31/2023  CT HEAD WO CONTRAST Date of Exam: 7/31/2023 2:33 PM EDT Indication: AMS. Comparison: 4/29/2022 MR brain Technique: Axial CT images were obtained of the head without contrast administration.  Automated exposure control and iterative construction methods were used. Findings: Parenchyma:No acute intraparenchymal hemorrhage. No loss of gray-white differentiation to suggest large territory infarct. Mild parenchymal volume loss. Scattered periventricular and subcortical white matter hypodensities, nonspecific, but most often consistent with  small vessel ischemic changes. No midline shift or herniation. Ventricles and extra axial spaces:Prominent ventricles and sulci secondary to volume loss. No extra axial fluid collection seen. Other: Right lens replacement. Paranasal sinuses are clear. Mastoid air cells are clear. Calvarium is intact. Intracranial atherosclerotic calcification is present.     Impression: Impression: No evidence of acute hemorrhage or large territory infarct Chronic changes as above. Electronically Signed: Anil Krishnamurthy  7/31/2023 2:54 PM EDT  Workstation ID: [id number]    CT Abdomen Pelvis With Contrast    Result Date: 7/31/2023  CT ABDOMEN PELVIS W CONTRAST Date of Exam: 7/31/2023 2:33 PM EDT Indication: ams. Comparison: CT of the abdomen and pelvis dated 5/6/2017 Technique: Axial CT images were obtained of the abdomen and pelvis following the uneventful intravenous administration of 85 mL Isovue-300. Reconstructed coronal and sagittal images were also obtained. Automated exposure control and iterative construction methods were used. Findings: Examination is limited due to motion artifact. Liver: The liver is unremarkable in morphology. No focal liver lesion is seen. No biliary dilation is seen. Gallbladder: Gallbladder is not well-visualized due to motion artifact. Pancreas: Unremarkable. Spleen: Unremarkable. Adrenal glands: Unremarkable. Genitourinary tract: Kidneys and ureters appear unremarkable. Bladder wall thickening may be related to underdistention or cystitis. Prostate gland is unremarkable. Gastrointestinal tract: Moderate colonic stool is present. Scattered colonic diverticula are seen. No findings to suggest bowel obstruction. Appendix: No findings to suggest acute appendicitis. Other findings: No free air or free fluid is identified. No pathologically enlarged lymph nodes are seen. Vascular calcifications are seen. IVC is unremarkable. Bones and soft tissues: No acute osseous lesion is identified. There are mild  degenerative changes of the spine. There is 10 mm anterolisthesis at L4-L5. Superficial soft tissues demonstrate no acute abnormality. Lung bases: Scattered bibasilar atelectasis.     Impression: Impression: 1.Examination is limited due to motion artifact. 2.Given this limitation, no acute abnormality identified within the abdomen or pelvis. 3.Moderate colonic stool. 4.Bladder wall thickening may be related to underdistention or cystitis. Please correlate with urinalysis. 5.Additional findings as detailed above. Electronically Signed: Ryan Perales  7/31/2023 2:59 PM EDT  Workstation ID: [id number]    XR Chest 1 View    Result Date: 7/31/2023  XR CHEST 1 VW Date of Exam: 7/31/2023 1:02 PM EDT Indication: AMS Comparison: 12/10/2022 Findings: Heart shadow is mildly enlarged. Vasculature is cephalized. Minimal if any perihilar edema present; overall pattern is similar to the prior study. There may be a small area of focal atelectasis in the medial left lung base. No other focal lung disease, effusion or pneumothorax is seen.     Impression: Impression: Moderate pulmonary venous hypertension. Small focus of left lower lobe atelectasis. Electronically Signed: Wisam Oquendo  7/31/2023 1:35 PM EDT  Workstation ID: [id number]         Assessment & Plan  Assessment & Plan     Active Hospital Problems    Diagnosis  POA    **AMS (altered mental status) [R41.82]  Yes    S/P transmetatarsal amputation of foot, left [Z89.432]  Not Applicable    Neurocognitive disorder [R41.9]  Yes    Type 2 diabetes mellitus [E11.9]  Yes    Essential hypertension [I10]  Yes    Psychotic disorder [F29]  Yes     Omkar Nava is a 67 y.o. male with PMHx significant for dementia w/psychosis, DMII, HTN, polysubstance abuse, osteomyelitis L foot s/p left transmetatarsal amputation 12/2022 who currently resides in nursing home who presented with AMS and fever.    AMS   Fever/Sepsis - concern for CNS infection  - empirically cover for meningitis for now given  AMS/headache and fevers, will use ampicillin, CTX, Vancomycin, acyclovir  - LP attempted in the ER as well as IR without success -mostly due to agitation  - infectious work-up including UA, resp PCR, CT abd/pelvis, CT head, CXR unremarkable for clear source of fever  - could consider partially treated cystitis as etiology, however UA is bland  - de-escalate abx as able, re-attempt LP in the AM    Dementia w/Psychosis:  - PRN geodon and ativan for agtitation  - continue home oral regimen when okay to take PO - appears he is on depakote, PRN atarax and nightly scheduled ativan    DMII  - SSI coverage for now    HTN:  - continue home regimen, PRN IV medications     DVT prophylaxis:  Hold Eliquis, Lovenox for now    CODE STATUS:  Daughter states patient is to remain a full code  Code Status and Medical Interventions:   Ordered at: 07/31/23 1801     Level Of Support Discussed With:    Health Care Surrogate     Code Status (Patient has no pulse and is not breathing):    CPR (Attempt to Resuscitate)     Medical Interventions (Patient has pulse or is breathing):    Full Support     Release to patient:    Routine Release     Total time spent: 78 minutes  Time spent includes time reviewing chart, face-to-face time, counseling patient/family/caregiver, ordering medications/tests/procedures, communicating with other health care professionals, documenting clinical information in the electronic health record, and coordination of care.       Expected Discharge TBD  Expected discharge date/ time has not been documented.     Sarah Gann DO  07/31/23      Electronically signed by Sarah Gann DO at 07/31/23 1906       Current Facility-Administered Medications   Medication Dose Route Frequency Provider Last Rate Last Admin    acetaminophen (TYLENOL) tablet 650 mg  650 mg Oral Q4H PRN aSrah Gann DO   650 mg at 08/06/23 2152    apixaban (ELIQUIS) tablet 2.5 mg  2.5 mg Oral Q12H Yash Danielson MD   2.5 mg at  08/07/23 0823    sennosides-docusate (PERICOLACE) 8.6-50 MG per tablet 2 tablet  2 tablet Oral BID Sarah Gann DO   2 tablet at 08/06/23 0918    And    polyethylene glycol (MIRALAX) packet 17 g  17 g Oral Daily PRN Sarah Gann DO        And    bisacodyl (DULCOLAX) EC tablet 5 mg  5 mg Oral Daily PRN Sarah Gann DO        And    bisacodyl (DULCOLAX) suppository 10 mg  10 mg Rectal Daily PRN Sarah Gann DO        brimonidine (ALPHAGAN) 0.2 % ophthalmic solution 1 drop  1 drop Both Eyes TID Sarah Gann DO   1 drop at 08/07/23 0828    And    timolol (TIMOPTIC) 0.5 % ophthalmic solution 1 drop  1 drop Both Eyes BID Sarah Gann DO   1 drop at 08/07/23 0829    cefuroxime (CEFTIN) tablet 500 mg  500 mg Oral Q12H Rayray Gordon MD   500 mg at 08/07/23 0823    dextrose (D50W) (25 g/50 mL) IV injection 25 g  25 g Intravenous Q15 Min PRN Sarah Gann DO        dextrose (GLUTOSE) oral gel 15 g  15 g Oral Q15 Min PRN Sarah Gann DO        divalproex (DEPAKOTE) DR tablet 250 mg  250 mg Oral BID Sarah Gann DO   250 mg at 08/07/23 0824    doxycycline (MONODOX) capsule 100 mg  100 mg Oral Q12H Rayray Gordon MD   100 mg at 08/07/23 0823    famotidine (PEPCID) tablet 20 mg  20 mg Oral BID Sarah Gann DO   20 mg at 08/07/23 0824    ferrous sulfate tablet 325 mg  325 mg Oral Daily With Breakfast Sarah Gann DO   325 mg at 08/07/23 0824    glucagon (GLUCAGEN) injection 1 mg  1 mg Intramuscular Q15 Min PRN Sarah Gann DO        hydrALAZINE (APRESOLINE) injection 10 mg  10 mg Intravenous Q6H PRN Sarah Gann DO   10 mg at 08/05/23 1930    hydrALAZINE (APRESOLINE) tablet 75 mg  75 mg Oral Q8H Chely Lion, NARGIS        hydrOXYzine (ATARAX) tablet 50 mg  50 mg Oral Q6H PRN Sarah Gann, DO   50 mg at 08/05/23 1339    insulin detemir (LEVEMIR) injection 5 Units  5 Units Subcutaneous Nightly Chely Lion, NARGIS   5 Units at  23    Insulin Lispro (humaLOG) injection 2-7 Units  2-7 Units Subcutaneous 4x Daily AC & at Bedtime Sarah Gann DO   4 Units at 23    Insulin Lispro (humaLOG) injection 5 Units  5 Units Subcutaneous TID With Meals Chely Lion, NARGIS   5 Units at 2324    lisinopril (PRINIVIL,ZESTRIL) tablet 40 mg  40 mg Oral Daily Sarah Gann DO   40 mg at 23    melatonin tablet 5 mg  5 mg Oral Nightly Sarah Gann DO   5 mg at 23    ondansetron (ZOFRAN) injection 4 mg  4 mg Intravenous Q6H PRN Sarah Gann DO        povidone-iodine 10 % swab 1 each  1 application  Topical Daily Yash Danielson MD   1 each at 23 0921    sodium chloride 0.9 % flush 10 mL  10 mL Intravenous PRN Sarah Gann,         sodium chloride 0.9 % flush 10 mL  10 mL Intravenous Q12H Sarah Gann, DO   10 mL at 23 0830    sodium chloride 0.9 % flush 10 mL  10 mL Intravenous PRN Sarah Gann,         sodium chloride 0.9 % infusion 40 mL  40 mL Intravenous PRN Sarah Gann DO        sodium chloride nasal spray 2 spray  2 spray Each Nare PRN Yash Danielson MD        thiamine (VITAMIN B-1) tablet 100 mg  100 mg Oral TID Sarah Gann DO   100 mg at 2324    ziprasidone (GEODON) injection 10 mg  10 mg Intramuscular Q4H PRN Sarah Gann DO            Physician Progress Notes (most recent note)        Chely Lion, NARGIS at 23 0900              [facility] Medicine Services  PROGRESS NOTE    Patient Name: Omkar Nava  : 1955  MRN: [id number]    Date of Admission: 2023  Primary Care Physician: Deann Cao MD    Subjective   Subjective     CC:  F/u AMS     HPI:  Patient is resting in bed in NAD. He denies any pain. No acute events overnight per nursing.     ROS:  Unable to assess due to mental status      Objective   Objective     Vital Signs:   Temp:  [96.6 øF (35.9  øC)-98.1 øF (36.7 øC)] 98.1 øF (36.7 øC)  Heart Rate:  [57-81] 66  Resp:  [18] 18  BP: (158-191)/(70-98) 168/98     Physical Exam:  Constitutional: No acute distress, awake, alert  HENT: NCAT, mucous membranes moist  Respiratory: Clear to auscultation bilaterally, respiratory effort normal room air   Cardiovascular: RRR, no murmurs, rubs, or gallops  Gastrointestinal: Positive bowel sounds, soft, nontender, nondistended  Musculoskeletal: No bilateral ankle edema  Psychiatric: Appropriate affect, cooperative  Neurologic: Oriented x 1-2, strength symmetric in all extremities, Cranial Nerves grossly intact to confrontation, speech clear  Skin: No rashes      Results Reviewed:  LAB RESULTS:      Lab 08/05/23  0635 08/03/23 0424 08/02/23  1023 08/02/23  1022 07/31/23  1231   WBC 6.17 6.41 8.46  --  11.55*   HEMOGLOBIN 7.9* 8.0* 8.9*  --  9.3*   HEMATOCRIT 24.2* 25.1* 27.6*  --  29.6*   PLATELETS 222 205 236  --  256   NEUTROS ABS  --  3.52 5.72  --  9.17*   IMMATURE GRANS (ABS)  --  0.02 0.03  --  0.06*   LYMPHS ABS  --  1.91 1.68  --  1.73   MONOS ABS  --  0.66 0.82  --  0.55   EOS ABS  --  0.28 0.18  --  0.01   MCV 88.3 88.4 87.9  --  88.9   CRP  --   --  0.59*  --   --    PROCALCITONIN  --   --  0.09  --  0.05   LACTATE  --  0.9  --  0.8 1.1         Lab 08/03/23  0424 08/02/23  1023 07/31/23  1231   SODIUM 140 140  140 138   POTASSIUM 4.0 4.2  4.2 5.2   CHLORIDE 105 106  106 103   CO2 25.0 24.0  24.0 22.0   ANION GAP 10.0 10.0  10.0 13.0   BUN 18 20  20 22   CREATININE 1.24 1.31*  1.31* 1.23   EGFR 64.5 60.4  60.4 65.2   GLUCOSE 177* 150*  150* 159*   CALCIUM 8.1* 8.7  8.7 9.3   MAGNESIUM  --   --  1.8         Lab 08/03/23  0424 08/02/23  1023 07/31/23  1231   TOTAL PROTEIN 5.6* 6.4 7.6   ALBUMIN 3.1* 3.2* 3.8   GLOBULIN 2.5 3.2 3.8   ALT (SGPT) 15 19 25   AST (SGOT) 13 17 25   BILIRUBIN <0.2 <0.2 <0.2   ALK PHOS 67 65 75   LIPASE  --   --  19                 Lab 08/05/23  0635   IRON 40*   IRON SATURATION  (TSAT) 14*   TIBC 282*   TRANSFERRIN 189*   FERRITIN 136.20   FOLATE 8.68   VITAMIN B 12 507         Brief Urine Lab Results  (Last result in the past 365 days)        Color   Clarity   Blood   Leuk Est   Nitrite   Protein   CREAT   Urine HCG        08/02/23 1319 Yellow   Clear   Negative   Negative   Negative   30 mg/dL (1+)                   Microbiology Results Abnormal       Procedure Component Value - Date/Time    Blood Culture - Blood, Hand, Left [[id number]]  (Normal) Collected: 07/31/23 1246    Lab Status: Final result Specimen: Blood from Hand, Left Updated: 08/05/23 1316     Blood Culture No growth at 5 days    Blood Culture - Blood, Arm, Right [[id number]]  (Normal) Collected: 07/31/23 1246    Lab Status: Final result Specimen: Blood from Arm, Right Updated: 08/05/23 1316     Blood Culture No growth at 5 days    Respiratory Panel PCR w/COVID-19(SARS-CoV-2) GIANNI/SIENNA/ANNA/PAD/COR/MAD/ASHIA In-House, NP Swab in UTM/VTM, 3-4 HR TAT - Swab, Nasopharynx [[id number]]  (Normal) Collected: 07/31/23 1328    Lab Status: Final result Specimen: Swab from Nasopharynx Updated: 07/31/23 1439     ADENOVIRUS, PCR Not Detected     Coronavirus 229E Not Detected     Coronavirus HKU1 Not Detected     Coronavirus NL63 Not Detected     Coronavirus OC43 Not Detected     COVID19 Not Detected     Human Metapneumovirus Not Detected     Human Rhinovirus/Enterovirus Not Detected     Influenza A PCR Not Detected     Influenza B PCR Not Detected     Parainfluenza Virus 1 Not Detected     Parainfluenza Virus 2 Not Detected     Parainfluenza Virus 3 Not Detected     Parainfluenza Virus 4 Not Detected     RSV, PCR Not Detected     Bordetella pertussis pcr Not Detected     Bordetella parapertussis PCR Not Detected     Chlamydophila pneumoniae PCR Not Detected     Mycoplasma pneumo by PCR Not Detected    Narrative:      In the setting of a positive respiratory panel with a viral infection PLUS a negative procalcitonin without other underlying  concern for bacterial infection, consider observing off antibiotics or discontinuation of antibiotics and continue supportive care. If the respiratory panel is positive for atypical bacterial infection (Bordetella pertussis, Chlamydophila pneumoniae, or Mycoplasma pneumoniae), consider antibiotic de-escalation to target atypical bacterial infection.            No radiology results from the last 24 hrs        Current medications:  Scheduled Meds:apixaban, 2.5 mg, Oral, Q12H  brimonidine, 1 drop, Both Eyes, TID   And  timolol, 1 drop, Both Eyes, BID  cefuroxime, 500 mg, Oral, Q12H  divalproex, 250 mg, Oral, BID  doxycycline, 100 mg, Oral, Q12H  famotidine, 20 mg, Oral, BID  ferrous sulfate, 325 mg, Oral, Daily With Breakfast  hydrALAZINE, 50 mg, Oral, Q8H  insulin detemir, 5 Units, Subcutaneous, Nightly  insulin lispro, 2-7 Units, Subcutaneous, 4x Daily AC & at Bedtime  Insulin Lispro, 3 Units, Subcutaneous, TID With Meals  lisinopril, 40 mg, Oral, Daily  melatonin, 5 mg, Oral, Nightly  povidone-iodine, 1 application , Topical, Daily  senna-docusate sodium, 2 tablet, Oral, BID  sodium chloride, 10 mL, Intravenous, Q12H  thiamine, 100 mg, Oral, TID      Continuous Infusions:   PRN Meds:.  acetaminophen    senna-docusate sodium **AND** polyethylene glycol **AND** bisacodyl **AND** bisacodyl    dextrose    dextrose    glucagon (human recombinant)    hydrALAZINE    hydrOXYzine    ondansetron    [COMPLETED] Insert Peripheral IV **AND** sodium chloride    sodium chloride    sodium chloride    sodium chloride    ziprasidone    Assessment & Plan   Assessment & Plan     Active Hospital Problems    Diagnosis  POA    **AMS (altered mental status) [R41.82]  Yes    S/P transmetatarsal amputation of foot, left [Z89.432]  Not Applicable    Neurocognitive disorder [R41.9]  Yes    Type 2 diabetes mellitus [E11.9]  Yes    Essential hypertension [I10]  Yes    Psychotic disorder [F29]  Yes      Resolved Hospital Problems   No resolved  problems to display.        Brief Hospital Course to date:  Omkar Nava is a 67 y.o. male with PMHx significant for dementia w/psychosis, DMII, HTN, polysubstance abuse, osteomyelitis L foot s/p left transmetatarsal amputation 12/2022 who currently resides in nursing home who presented with AMS and fever.    Plan was partially entered by my partner and I have reviewed and updated as appropriate on 8/7/23     AMS   Fever/Sepsis - concern for CNS infection  - empirically covered for meningitis initially  - LP attempted in the ER as well as IR without success -mostly due to agitation  - infectious work-up including UA, resp PCR, CT abd/pelvis, CT head, CXR unremarkable for clear source of fever  - could consider partially treated cystitis as etiology, however UA is bland  - ID following s/p IV vanc and rocephin changed to PO doxy and cefuroxime 8/7/2023.  Stopped acyclovir.  D/w Dr. Gordon 8/2/23 who believes patient close to baseline and with difficulties with patient cooperating with LP  ok with not attempting again.      Dementia w/Psychosis:  -- Depakote, PRN atarax   - will discontinue ativan per family request.  Continue geodon as needed has not had a dose     Iron Def anemia  -- cont oral iron   -- baseline hgb 7-9     DMII  - SSI coverage for now  -- add basal and bolus      HTN:  - continue home regimen, PRN IV medications  -- increase hydralazine        Expected Discharge Location and Transportation: SNF, daughter would like another facility  Expected Discharge 8/7  Expected Discharge Date: 8/7/2023; Expected Discharge Time:      DVT prophylaxis:  Medical DVT prophylaxis orders are present.     AM-PAC 6 Clicks Score (PT): 10 (08/05/23 5869)    CODE STATUS:   Code Status and Medical Interventions:   Ordered at: 07/31/23 1801     Level Of Support Discussed With:    Health Care Surrogate     Code Status (Patient has no pulse and is not breathing):    CPR (Attempt to Resuscitate)     Medical Interventions  (Patient has pulse or is breathing):    Full Support     Release to patient:    Routine Release       NARGIS Funez  23        Electronically signed by Chely Lion APRN at 23 1041          Physical Therapy Notes (most recent note)        Micaela Kaplan, PT at 23 0911  Version 1 of 1         Patient Name: Omkar Nava  : 1955    MRN: [id number]                              Today's Date: 2023       Admit Date: 2023    Visit Dx:     ICD-10-CM ICD-9-CM   1. Febrile illness  R50.9 780.60   2. 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, without long-term current use of insulin    Orthostatic hypotension    Acute osteomyelitis of left foot    Type 2 diabetes mellitus    Essential hypertension    Psychotic disorder    Neurocognitive disorder    S/P transmetatarsal amputation of foot, left    AMS (altered mental status)     Past Medical History:   Diagnosis Date    Anemia     Dementia     Diabetes mellitus     Dysphagia     GERD (gastroesophageal reflux disease)     History of alcohol abuse     History of cocaine use     History of marijuana use     Hypertension     Osteomyelitis     Poor historian     records obtained from nursing home records & his family    Visual impairment      Past Surgical History:   Procedure Laterality Date    AMPUTATION FOOT Left 10/18/2022    Procedure: PARTIAL FIRST RAY AMPUTATION LEFT;  Surgeon: Yeison Petty MD;  Location: [facility] OR;  Service: Orthopedics;  Laterality: Left;    AMPUTATION FOOT Left 2022    Procedure: Transmetatarsal of Left Foot;  Surgeon: Yeison Petty MD;  Location: [facility] OR;  Service: Orthopedics;  Laterality: Left;    EYE SURGERY        General Information       Row Name  08/04/23 1324          Physical Therapy Time and Intention    Document Type evaluation  -KG     Mode of Treatment physical therapy  -KG       Row Name 08/04/23 1324          General Information    Patient Profile Reviewed yes  -KG     Prior Level of Function --  pt is limited historian; TBA later  -KG     Existing Precautions/Restrictions fall;other (see comments)  dementia; remote L transmetatarsal amputation  -KG     Barriers to Rehab medically complex;previous functional deficit;cognitive status  -KG       Row Name 08/04/23 1324          Living Environment    People in Home facility resident  -KG       Row Name 08/04/23 1324          Home Main Entrance    Number of Stairs, Main Entrance none  -KG       Row Name 08/04/23 1324          Stairs Within Home, Primary    Number of Stairs, Within Home, Primary none  -KG       Row Name 08/04/23 1324          Cognition    Orientation Status (Cognition) oriented to;person  -KG       Row Name 08/04/23 1324          Safety Issues, Functional Mobility    Safety Issues Affecting Function (Mobility) ability to follow commands;awareness of need for assistance;insight into deficits/self-awareness;safety precaution awareness;safety precautions follow-through/compliance;sequencing abilities  -KG     Impairments Affecting Function (Mobility) balance;cognition;coordination;endurance/activity tolerance;postural/trunk control;strength  -KG     Cognitive Impairments, Mobility Safety/Performance attention;awareness, need for assistance;insight into deficits/self-awareness;safety precaution awareness;safety precaution follow-through;sequencing abilities  -KG               User Key  (r) = Recorded By, (t) = Taken By, (c) = Cosigned By      Initials Name Provider Type    KG Micaela Kaplan, PT Physical Therapist                   Mobility       Row Name 08/04/23 1325          Bed Mobility    Bed Mobility scooting/bridging;sit-supine  -KG     Scooting/Bridging [place] (Bed  Mobility) maximum assist (25% patient effort);2 person assist;verbal cues  -KG     Sit-Supine [place] (Bed Mobility) moderate assist (50% patient effort);verbal cues  -KG     Assistive Device (Bed Mobility) bed rails;draw sheet;head of bed elevated  -KG     Comment, (Bed Mobility) VC's for sequencing and technique. Pt required assistance at trunk and BLEs.  -KG       Row Name 08/04/23 1325          Transfers    Comment, (Transfers) STS x1 from EOB. VC's for sequencing and technique. Pt required increased time and effort to complete. Unable to achieve full upright posture. Pt declined additional mobility despite encouragement.  -KG       Row Name 08/04/23 1325          Sit-Stand Transfer    Sit-Stand [place] (Transfers) moderate assist (50% patient effort);2 person assist;verbal cues  -KG     Assistive Device (Sit-Stand Transfers) other (see comments)  B UE support  -KG       Row Name 08/04/23 1325          Gait/Stairs (Locomotion)    [place] Level (Gait) unable to assess  -KG     Comment, (Gait/Stairs) Ambulation deferred. Not appropriate to assess.  -KG               User Key  (r) = Recorded By, (t) = Taken By, (c) = Cosigned By      Initials Name Provider Type    KG Micaela Kaplan, PT Physical Therapist                   Obj/Interventions       Row Name 08/04/23 1327          Range of Motion Comprehensive    General Range of Motion no range of motion deficits identified  -KG     Comment, General Range of Motion B LE WFL  -KG       Row Name 08/04/23 1327          Strength Comprehensive (MMT)    Comment, General Manual Muscle Testing (MMT) Assessment B LE grossly 3+/5  -KG       Row Name 08/04/23 1327          Balance    Balance Assessment sitting static balance;standing static balance;standing dynamic balance  -KG     Static Sitting Balance contact guard  -KG     Position, Sitting Balance unsupported;sitting edge of bed  -KG     Static Standing Balance moderate assist;2-person assist  -KG      Dynamic Standing Balance moderate assist;2-person assist  -KG     Position/Device Used, Standing Balance supported  -KG       Row Name 08/04/23 1327          Sensory Assessment (Somatosensory)    Sensory Assessment (Somatosensory) unable/difficult to assess  -KG               User Key  (r) = Recorded By, (t) = Taken By, (c) = Cosigned By      Initials Name Provider Type    KG Micaela Kaplan N, PT Physical Therapist                   Goals/Plan       Row Name 08/04/23 1331          Bed Mobility Goal 1 (PT)    Activity/Assistive Device (Bed Mobility Goal 1, PT) sit to supine;supine to sit  -KG     [place] Level/Cues Needed (Bed Mobility Goal 1, PT) contact guard required  -KG     Time Frame (Bed Mobility Goal 1, PT) 2 weeks  -KG     Progress/Outcomes (Bed Mobility Goal 1, PT) goal ongoing  -KG       Row Name 08/04/23 1331          Transfer Goal 1 (PT)    Activity/Assistive Device (Transfer Goal 1, PT) sit-to-stand/stand-to-sit;bed-to-chair/chair-to-bed  -KG     [place] Level/Cues Needed (Transfer Goal 1, PT) minimum assist (75% or more patient effort)  -KG     Time Frame (Transfer Goal 1, PT) 2 weeks  -KG     Progress/Outcome (Transfer Goal 1, PT) goal ongoing  -KG       Row Name 08/04/23 1331          Gait Training Goal 1 (PT)    Activity/Assistive Device (Gait Training Goal 1, PT) gait (walking locomotion);assistive device use;walker, rolling  -KG     [place] Level (Gait Training Goal 1, PT) moderate assist (50-74% patient effort)  -KG     Distance (Gait Training Goal 1, PT) 15 feet  -KG     Time Frame (Gait Training Goal 1, PT) 2 weeks  -KG     Progress/Outcome (Gait Training Goal 1, PT) goal ongoing  -KG       Row Name 08/04/23 1331          Therapy Assessment/Plan (PT)    Planned Therapy Interventions (PT) balance training;bed mobility training;gait training;strengthening;transfer training  -KG               User Key  (r) = Recorded By, (t) = Taken By, (c) = Cosigned By      Initials Name  Provider Type    KG Micaela Kaplan, PT Physical Therapist                   Clinical Impression       [facility] Name 08/04/23 1328          Pain    Additional Documentation Pain Scale: FACES Pre/Post-Treatment (Group)  -KG       Row Name 08/04/23 1328          Pain Scale: FACES Pre/Post-Treatment    Pain: FACES Scale, Pretreatment 0-->no hurt  -KG     Posttreatment Pain Rating 0-->no hurt  -KG       Row Name 08/04/23 1328          Plan of Care Review    Plan of Care Reviewed With patient  -KG     Outcome Evaluation PT initial evaluation completed for pt presenting with generalized weakness, confusion, impaired balance and coordination, decreased safety awareness, and decreased functional mobility. Pt required modA x2 for STS transfers. Pt's decreased independence warrants PT skilled care. Recommend D/C to SNF.  -KG       [facility] Name 08/04/23 1328          Therapy Assessment/Plan (PT)    Patient/Family Therapy Goals Statement (PT) pt unable to state  -KG     Rehab Potential (PT) fair, will monitor progress closely  -KG     Criteria for Skilled Interventions Met (PT) yes;skilled treatment is necessary  -KG     Therapy Frequency (PT) daily  -KG       Row Name 08/04/23 1328          Vital Signs    Pre Systolic BP Rehab 178  -KG     Pre Treatment Diastolic BP 91  -KG     Pretreatment Heart Rate (beats/min) 77  -KG     Posttreatment Heart Rate (beats/min) 70  -KG     O2 Delivery Pre Treatment room air  -KG     O2 Delivery Post Treatment room air  -KG     Pre Patient Position Sitting  -KG     Intra Patient Position Standing  -KG     Post Patient Position Supine  -KG       [facility] Name 08/04/23 1328          Positioning and Restraints    Pre-Treatment Position in bed  -KG     Post Treatment Position bed  -KG     In Bed notified nsg;supine;call light within reach;encouraged to call for assist;exit alarm on;side rails up x3  -KG               User Key  (r) = Recorded By, (t) = Taken By, (c) = Cosigned By      Initials Name Provider  Type    KG Micaela Kaplan, PT Physical Therapist                   Outcome Measures       Row Name 08/04/23 1331          How much help from another person do you currently need...    Turning from your back to your side while in flat bed without using bedrails? 2  -KG     Moving from lying on back to sitting on the side of a flat bed without bedrails? 2  -KG     Moving to and from a bed to a chair (including a wheelchair)? 2  -KG     Standing up from a chair using your arms (e.g., wheelchair, bedside chair)? 2  -KG     Climbing 3-5 steps with a railing? 1  -KG     To walk in hospital room? 1  -KG     AM-PAC 6 Clicks Score (PT) 10  -KG     Highest level of mobility 4 --> Transferred to chair/commode  -KG       Row Name 08/04/23 1331 08/04/23 1229       Functional Assessment    Outcome Measure Options AM-PAC 6 Clicks Basic Mobility (PT)  -KG AM-PAC 6 Clicks Daily Activity (OT)  -CS              User Key  (r) = Recorded By, (t) = Taken By, (c) = Cosigned By      Initials Name Provider Type    CS Sarah Jade, OT Occupational Therapist    KG Micaela Kaplan, PT Physical Therapist                                 Physical Therapy Education       Title: PT OT SLP Therapies (In Progress)       Topic: Physical Therapy (In Progress)       Point: Mobility training (In Progress)       Learning Progress Summary             Patient Acceptance, E, NR by KG at 8/4/2023 0911                         Point: Home exercise program (Not Started)       Learner Progress:  Not documented in this visit.              Point: Body mechanics (In Progress)       Learning Progress Summary             Patient Acceptance, E, NR by KG at 8/4/2023 0911                         Point: Precautions (In Progress)       Learning Progress Summary             Patient Acceptance, E, NR by KG at 8/4/2023 0911                                         User Key       Initials Effective Dates Name Provider Type Discipline     05/22/20 -  Rosaura  Micaela LU, PT Physical Therapist PT                  PT Recommendation and Plan  Planned Therapy Interventions (PT): balance training, bed mobility training, gait training, strengthening, transfer training  Plan of Care Reviewed With: patient  Outcome Evaluation: PT initial evaluation completed for pt presenting with generalized weakness, confusion, impaired balance and coordination, decreased safety awareness, and decreased functional mobility. Pt required modA x2 for STS transfers. Pt's decreased independence warrants PT skilled care. Recommend D/C to SNF.     Time Calculation:   PT Evaluation Complexity  History, PT Evaluation Complexity: 3 or more personal factors and/or comorbidities  Examination of Body Systems (PT Eval Complexity): total of 3 or more elements  Clinical Presentation (PT Evaluation Complexity): evolving  Clinical Decision Making (PT Evaluation Complexity): moderate complexity  Overall Complexity (PT Evaluation Complexity): moderate complexity     PT Charges       Row Name 08/04/23 0911             Time Calculation    Start Time 0911  -KG      PT Received On 08/04/23  -KG      PT Goal Re-Cert Due Date 08/14/23  -KG         Untimed Charges    PT Eval/Re-eval Minutes 51  -KG         Total Minutes    Untimed Charges Total Minutes 51  -KG       Total Minutes 51  -KG                User Key  (r) = Recorded By, (t) = Taken By, (c) = Cosigned By      Initials Name Provider Type    KG Micaela Kaplan, PT Physical Therapist                  Therapy Charges for Today       Code Description Service Date Service Provider Modifiers Qty    [id number]  PT EVAL MOD COMPLEXITY 4 8/4/2023 Micaela Kaplan, PT GP 1            PT G-Codes  Outcome Measure Options: AM-PAC 6 Clicks Basic Mobility (PT)  AM-PAC 6 Clicks Score (PT): 10  AM-PAC 6 Clicks Score (OT): 11  PT Discharge Summary  Anticipated Discharge Disposition (PT): skilled nursing facility    Lisette Kaplan  PT  2023      Electronically signed by Micaela Kaplan, PT at 23 1332          Occupational Therapy Notes (most recent note)        Sarah Jade, OT at 23 0850          Patient Name: Omkar Nava  : 1955    MRN: [id number]                              Today's Date: 2023       Admit Date: 2023    Visit Dx:     ICD-10-CM ICD-9-CM   1. Febrile illness  R50.9 780.60   2. 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, without long-term current use of insulin    Orthostatic hypotension    Acute osteomyelitis of left foot    Type 2 diabetes mellitus    Essential hypertension    Psychotic disorder    Neurocognitive disorder    S/P transmetatarsal amputation of foot, left    AMS (altered mental status)     Past Medical History:   Diagnosis Date    Anemia     Dementia     Diabetes mellitus     Dysphagia     GERD (gastroesophageal reflux disease)     History of alcohol abuse     History of cocaine use     History of marijuana use     Hypertension     Osteomyelitis     Poor historian     records obtained from nursing home records & his family    Visual impairment      Past Surgical History:   Procedure Laterality Date    AMPUTATION FOOT Left 10/18/2022    Procedure: PARTIAL FIRST RAY AMPUTATION LEFT;  Surgeon: Yeison Petty MD;  Location:  [facility] OR;  Service: Orthopedics;  Laterality: Left;    AMPUTATION FOOT Left 2022    Procedure: Transmetatarsal of Left Foot;  Surgeon: Yeison Petty MD;  Location:  [facility] OR;  Service: Orthopedics;  Laterality: Left;    EYE SURGERY        General Information       Row Name 23 1220          OT Time and Intention    Document Type evaluation  -CS     Mode of Treatment occupational therapy  -CS       Row Name  08/04/23 1220          General Information    Patient Profile Reviewed yes  -CS     Prior Level of Function --  Pt limited historian, TBA further  -CS     Existing Precautions/Restrictions fall;other (see comments)  dementia, h/o L transmetatarsal amputation  -CS     Barriers to Rehab medically complex;previous functional deficit;cognitive status  -CS       Row Name 08/04/23 1220          Living Environment    People in Home facility resident  LTC  -CS       Row Name 08/04/23 1220          Cognition    Orientation Status (Cognition) oriented to;person;disoriented to;place;situation;time  -CS       Row Name 08/04/23 1220          Safety Issues, Functional Mobility    Impairments Affecting Function (Mobility) balance;cognition;strength;motor planning;postural/trunk control;coordination;range of motion (ROM);sensation/sensory awareness  -CS     Cognitive Impairments, Mobility Safety/Performance attention;insight into deficits/self-awareness;sequencing abilities;impulsivity;awareness, need for assistance;safety precaution awareness;safety precaution follow-through;judgment;problem-solving/reasoning  -CS               User Key  (r) = Recorded By, (t) = Taken By, (c) = Cosigned By      Initials Name Provider Type    CS Sarah Jade OT Occupational Therapist                     Mobility/ADL's       Row Name 08/04/23 1222          Bed Mobility    Bed Mobility rolling left;rolling right;supine-sit  -CS     Rolling Left [place] (Bed Mobility) moderate assist (50% patient effort);2 person assist;verbal cues  -CS     Rolling Right [place] (Bed Mobility) moderate assist (50% patient effort);verbal cues  -CS     Supine-Sit [place] (Bed Mobility) moderate assist (50% patient effort);verbal cues  -CS     Assistive Device (Bed Mobility) bed rails;draw sheet  -CS       Row Name 08/04/23 1222          Transfers    Transfers sit-stand transfer;stand-sit transfer  -CS       Row Name 08/04/23 1222          Sit-Stand  Transfer    Sit-Stand [place] (Transfers) moderate assist (50% patient effort);2 person assist;verbal cues  -CS       Row Name 08/04/23 1222          Stand-Sit Transfer    Stand-Sit [place] (Transfers) moderate assist (50% patient effort);2 person assist;verbal cues  -CS       Row Name 08/04/23 1222          Activities of Daily Living    BADL Assessment/Intervention lower body dressing;feeding;toileting;upper body dressing  -CS       Row Name 08/04/23 1222          Lower Body Dressing Assessment/Training    [place] Level (Lower Body Dressing) doff;don;maximum assist (25% patient effort)  -CS     Position (Lower Body Dressing) supine  -CS     Comment, (Lower Body Dressing) hosp gown  -CS       Row Name 08/04/23 1222          Self-Feeding Assessment/Training    [place] Level (Feeding) liquids to mouth;set up  -CS     Position (Self-Feeding) sitting up in bed  -CS       Row Name 08/04/23 1222          Toileting Assessment/Training    [place] Level (Toileting) adjust/manage clothing;change pad/brief;perform perineal hygiene;dependent (less than 25% patient effort)  -CS     Position (Toileting) supine  -CS       Row Name 08/04/23 1222          Upper Body Dressing Assessment/Training    [place] Level (Upper Body Dressing) doff;don;moderate assist (50% patient effort)  -CS     Position (Upper Body Dressing) sitting up in bed  -CS     Comment, (Upper Body Dressing) hosp gown  -CS               User Key  (r) = Recorded By, (t) = Taken By, (c) = Cosigned By      Initials Name Provider Type    CS Sarah Jade OT Occupational Therapist                   Obj/Interventions       Row Name 08/04/23 1223          Sensory Assessment (Somatosensory)    Sensory Assessment (Somatosensory) unable/difficult to assess  -CS       Row Name 08/04/23 1223          Vision Assessment/Intervention    Visual Impairment/Limitations unable/difficult to assess  -CS       Row Name 08/04/23 1223          Range of Motion  Comprehensive    General Range of Motion bilateral upper extremity ROM WFL  -CS       Row Name 08/04/23 1223          Strength Comprehensive (MMT)    Comment, General Manual Muscle Testing (MMT) Assessment BUE observed grossly 4-/5  -CS       Row Name 08/04/23 1223          Balance    Balance Assessment sitting static balance;sitting dynamic balance;standing static balance;standing dynamic balance  -CS     Static Sitting Balance contact guard  -CS     Dynamic Sitting Balance minimal assist  -CS     Position, Sitting Balance sitting edge of bed  -CS     Static Standing Balance moderate assist;2-person assist  -CS     Dynamic Standing Balance moderate assist;2-person assist  -CS     Position/Device Used, Standing Balance supported  -CS     Balance Interventions sitting;standing;static;dynamic;dynamic reaching;occupation based/functional task;weight shifting activity  -CS               User Key  (r) = Recorded By, (t) = Taken By, (c) = Cosigned By      Initials Name Provider Type    CS Sarah Jade, OT Occupational Therapist                   Goals/Plan       Row Name 08/04/23 1228          Transfer Goal 1 (OT)    Activity/Assistive Device (Transfer Goal 1, OT) sit-to-stand/stand-to-sit;toilet  -CS     [place] Level/Cues Needed (Transfer Goal 1, OT) minimum assist (75% or more patient effort)  -CS     Time Frame (Transfer Goal 1, OT) long term goal (LTG);10 days  -CS     Progress/Outcome (Transfer Goal 1, OT) goal ongoing  -       Row Name 08/04/23 1228          Grooming Goal 1 (OT)    Activity/Device (Grooming Goal 1, OT) oral care;wash face, hands  -CS     [place] (Grooming Goal 1, OT) standby assist  -CS     Time Frame (Grooming Goal 1, OT) long term goal (LTG);10 days  -CS     Strategies/Barriers (Grooming Goal 1, OT) unsupported sitting  -CS     Progress/Outcome (Grooming Goal 1, OT) goal ongoing  -       Row Name 08/04/23 1228          Strength Goal 1 (OT)    Strength Goal 1 (OT) Pt will tolerate  BUE HEP w/ red thera-band x15 reps.  -CS     Time Frame (Strength Goal 1, OT) long term goal (LTG);10 days  -CS     Progress/Outcome (Strength Goal 1, OT) goal ongoing  -CS       Row Name 08/04/23 1228          Therapy Assessment/Plan (OT)    Planned Therapy Interventions (OT) activity tolerance training;adaptive equipment training;BADL retraining;functional balance retraining;occupation/activity based interventions;ROM/therapeutic exercise;strengthening exercise;transfer/mobility retraining  -CS               User Key  (r) = Recorded By, (t) = Taken By, (c) = Cosigned By      Initials Name Provider Type    CS Sarah Jade, OT Occupational Therapist                   Clinical Impression       Row Name 08/04/23 1223          Pain Assessment    Additional Documentation Pain Scale: FACES Pre/Post-Treatment (Group)  -CS       Row Name 08/04/23 1223          Pain Scale: FACES Pre/Post-Treatment    Pain: FACES Scale, Pretreatment 0-->no hurt  -CS     Posttreatment Pain Rating 0-->no hurt  -CS       Row Name 08/04/23 1223          Plan of Care Review    Plan of Care Reviewed With patient  -CS     Progress improving  -CS     Outcome Evaluation OT eval cpmplete. Pt presents w/ cognitive deficits, decreased safety awareness, balance deficits, and generalized weakness limiting functional independence from baseline. Recommend cont skilled IPOT POC. Recommend pt DC to SNF.  -CS       Row Name 08/04/23 1223          Therapy Assessment/Plan (OT)    Patient/Family Therapy Goal Statement (OT) Return to PLOF  -CS     Rehab Potential (OT) good, to achieve stated therapy goals  -CS     Criteria for Skilled Therapeutic Interventions Met (OT) yes;skilled treatment is necessary  -CS     Therapy Frequency (OT) daily  -CS       Row Name 08/04/23 1223          Therapy Plan Review/Discharge Plan (OT)    Anticipated Discharge Disposition (OT) skilled nursing facility  -CS       Row Name 08/04/23 1223          Vital Signs    Pre Systolic BP  Rehab --  VSS  -CS     O2 Delivery Pre Treatment room air  -CS     O2 Delivery Intra Treatment room air  -CS     O2 Delivery Post Treatment room air  -CS     Pre Patient Position Supine  -CS     Intra Patient Position Standing  -CS     Post Patient Position Sitting  -CS       Row Name 08/04/23 1223          Positioning and Restraints    Pre-Treatment Position in bed  -CS     Post Treatment Position bed  -CS     In Bed notified nsg;sitting EOB;with PT;call light within reach;encouraged to call for assist  -CS               User Key  (r) = Recorded By, (t) = Taken By, (c) = Cosigned By      Initials Name Provider Type    Sarah Renae OT Occupational Therapist                   Outcome Measures       Row Name 08/04/23 1229          How much help from another is currently needed...    Putting on and taking off regular lower body clothing? 1  -CS     Bathing (including washing, rinsing, and drying) 2  -CS     Toileting (which includes using toilet bed pan or urinal) 1  -CS     Putting on and taking off regular upper body clothing 2  -CS     Taking care of personal grooming (such as brushing teeth) 2  -CS     Eating meals 3  -CS     AM-PAC 6 Clicks Score (OT) 11  -CS       Row Name 08/04/23 1229          Functional Assessment    Outcome Measure Options AM-PAC 6 Clicks Daily Activity (OT)  -CS               User Key  (r) = Recorded By, (t) = Taken By, (c) = Cosigned By      Initials Name Provider Type    Sarah Renae OT Occupational Therapist                    Occupational Therapy Education       Title: PT OT SLP Therapies (In Progress)       Topic: Occupational Therapy (In Progress)       Point: ADL training (In Progress)       Description:   Instruct learner(s) on proper safety adaptation and remediation techniques during self care or transfers.   Instruct in proper use of assistive devices.                  Learning Progress Summary             Patient Acceptance, E, NR by KOJO at 8/4/2023 2035                          Point: Home exercise program (Not Started)       Description:   Instruct learner(s) on appropriate technique for monitoring, assisting and/or progressing therapeutic exercises/activities.                  Learner Progress:  Not documented in this visit.              Point: Precautions (In Progress)       Description:   Instruct learner(s) on prescribed precautions during self-care and functional transfers.                  Learning Progress Summary             Patient Acceptance, E, NR by  at 8/4/2023 1229                         Point: Body mechanics (In Progress)       Description:   Instruct learner(s) on proper positioning and spine alignment during self-care, functional mobility activities and/or exercises.                  Learning Progress Summary             Patient Acceptance, E, NR by  at 8/4/2023 1229                                         User Key       Initials Effective Dates Name Provider Type Discipline     09/02/21 -  Sarah Jade, OT Occupational Therapist OT                  OT Recommendation and Plan  Planned Therapy Interventions (OT): activity tolerance training, adaptive equipment training, BADL retraining, functional balance retraining, occupation/activity based interventions, ROM/therapeutic exercise, strengthening exercise, transfer/mobility retraining  Therapy Frequency (OT): daily  Plan of Care Review  Plan of Care Reviewed With: patient  Progress: improving  Outcome Evaluation: OT eval cpmplete. Pt presents w/ cognitive deficits, decreased safety awareness, balance deficits, and generalized weakness limiting functional independence from baseline. Recommend cont skilled IPOT POC. Recommend pt DC to SNF.     Time Calculation:   Evaluation Complexity (OT)  Review Occupational Profile/Medical/Therapy History Complexity: brief/low complexity  Assessment, Occupational Performance/Identification of Deficit Complexity: 3-5 performance deficits  Clinical Decision Making  Complexity (OT): problem focused assessment/low complexity  Overall Complexity of Evaluation (OT): low complexity     Time Calculation- OT       Row Name 08/04/23 1229             Time Calculation- OT    OT Start Time 0850  -CS      OT Received On 08/04/23  -CS      OT Goal Re-Cert Due Date 08/14/23  -CS         Untimed Charges    OT Eval/Re-eval Minutes 46  -CS         Total Minutes    Untimed Charges Total Minutes 46  -CS       Total Minutes 46  -CS                User Key  (r) = Recorded By, (t) = Taken By, (c) = Cosigned By      Initials Name Provider Type    CS Sarah Jade OT Occupational Therapist                  Therapy Charges for Today       Code Description Service Date Service Provider Modifiers Qty    65200500873 HC OT EVAL LOW COMPLEXITY 4 8/4/2023 Sarah Jade OT GO 1                 Sarah Jade OT  8/4/2023    Electronically signed by Sarah Jade OT at 08/04/23 1231

## 2023-08-07 NOTE — PLAN OF CARE
Goal Outcome Evaluation:      Pt's VSS, RA, SBP <180, complaints of shoulder pain addressed with Tylenol. No changes or acute events occurred. Continue POC.     Problem: Fall Injury Risk  Goal: Absence of Fall and Fall-Related Injury  Outcome: Ongoing, Progressing  Intervention: Identify and Manage Contributors  Recent Flowsheet Documentation  Taken 8/6/2023 1942 by Corrine Hernandez RN  Medication Review/Management: medications reviewed  Intervention: Promote Injury-Free Environment  Recent Flowsheet Documentation  Taken 8/7/2023 0600 by Corrine Hernandez RN  Safety Promotion/Fall Prevention: safety round/check completed  Taken 8/7/2023 0400 by Corrine Hernandez RN  Safety Promotion/Fall Prevention: safety round/check completed  Taken 8/7/2023 0200 by Corrine Hernandez RN  Safety Promotion/Fall Prevention: safety round/check completed  Taken 8/7/2023 0000 by Mary, Corrine, RN  Safety Promotion/Fall Prevention: safety round/check completed  Taken 8/6/2023 2200 by Corrine Hernandez RN  Safety Promotion/Fall Prevention: safety round/check completed  Taken 8/6/2023 1942 by Mary, Corrine, RN  Safety Promotion/Fall Prevention:   activity supervised   clutter free environment maintained   fall prevention program maintained   nonskid shoes/slippers when out of bed   room organization consistent   safety round/check completed     Problem: Skin Injury Risk Increased  Goal: Skin Health and Integrity  Outcome: Ongoing, Progressing  Intervention: Optimize Skin Protection  Recent Flowsheet Documentation  Taken 8/7/2023 0600 by Corrine Hernandez RN  Pressure Reduction Techniques:   frequent weight shift encouraged   pressure points protected   positioned off wounds   heels elevated off bed  Head of Bed (HOB) Positioning: HOB at 20-30 degrees  Pressure Reduction Devices:   positioning supports utilized   pressure-redistributing mattress utilized   heel offloading device utilized  Skin Protection:    adhesive use limited   incontinence pads utilized   tubing/devices free from skin contact  Taken 8/7/2023 0400 by Corrine Hernandez RN  Pressure Reduction Techniques:   frequent weight shift encouraged   positioned off wounds   pressure points protected   heels elevated off bed  Head of Bed (HOB) Positioning: HOB at 20-30 degrees  Pressure Reduction Devices:   pressure-redistributing mattress utilized   positioning supports utilized   heel offloading device utilized  Skin Protection:   adhesive use limited   incontinence pads utilized   tubing/devices free from skin contact  Taken 8/7/2023 0200 by Corrine Hernandez RN  Pressure Reduction Techniques:   frequent weight shift encouraged   heels elevated off bed   positioned off wounds   pressure points protected  Head of Bed (HOB) Positioning: HOB at 20-30 degrees  Pressure Reduction Devices:   pressure-redistributing mattress utilized   heel offloading device utilized   positioning supports utilized  Skin Protection:   adhesive use limited   incontinence pads utilized   tubing/devices free from skin contact  Taken 8/7/2023 0000 by Corrine Hernandez RN  Pressure Reduction Techniques:   frequent weight shift encouraged   heels elevated off bed   positioned off wounds   pressure points protected  Head of Bed (HOB) Positioning: HOB at 20 degrees  Pressure Reduction Devices:   positioning supports utilized   pressure-redistributing mattress utilized   heel offloading device utilized  Skin Protection:   adhesive use limited   incontinence pads utilized   tubing/devices free from skin contact  Taken 8/6/2023 2200 by Corrine Hernandez RN  Pressure Reduction Techniques:   frequent weight shift encouraged   heels elevated off bed   positioned off wounds   pressure points protected   weight shift assistance provided  Head of Bed (HOB) Positioning: HOB at 20-30 degrees  Pressure Reduction Devices:   positioning supports utilized   pressure-redistributing mattress  utilized   heel offloading device utilized  Skin Protection:   adhesive use limited   incontinence pads utilized   tubing/devices free from skin contact  Taken 8/6/2023 1942 by Corrine Hernandez RN  Pressure Reduction Techniques:   frequent weight shift encouraged   positioned off wounds   pressure points protected  Head of Bed (HOB) Positioning: HOB at 30 degrees  Pressure Reduction Devices:   pressure-redistributing mattress utilized   positioning supports utilized  Skin Protection:   adhesive use limited   incontinence pads utilized   tubing/devices free from skin contact     Problem: Adult Inpatient Plan of Care  Goal: Plan of Care Review  Outcome: Ongoing, Progressing  Goal: Patient-Specific Goal (Individualized)  Outcome: Ongoing, Progressing  Goal: Absence of Hospital-Acquired Illness or Injury  Outcome: Ongoing, Progressing  Intervention: Identify and Manage Fall Risk  Recent Flowsheet Documentation  Taken 8/7/2023 0600 by Corrine Hernandez RN  Safety Promotion/Fall Prevention: safety round/check completed  Taken 8/7/2023 0400 by Corrine Hernandez RN  Safety Promotion/Fall Prevention: safety round/check completed  Taken 8/7/2023 0200 by Corrine Hernandez RN  Safety Promotion/Fall Prevention: safety round/check completed  Taken 8/7/2023 0000 by Mary, Corrine, RN  Safety Promotion/Fall Prevention: safety round/check completed  Taken 8/6/2023 2200 by Corrine Hernandez RN  Safety Promotion/Fall Prevention: safety round/check completed  Taken 8/6/2023 1942 by Mary, Corrine, RN  Safety Promotion/Fall Prevention:   activity supervised   clutter free environment maintained   fall prevention program maintained   nonskid shoes/slippers when out of bed   room organization consistent   safety round/check completed  Intervention: Prevent Skin Injury  Recent Flowsheet Documentation  Taken 8/7/2023 0600 by Corrine Hernandez RN  Body Position: position changed independently  Skin Protection:    adhesive use limited   incontinence pads utilized   tubing/devices free from skin contact  Taken 8/7/2023 0400 by Corrine Hernandez RN  Body Position: position changed independently  Skin Protection:   adhesive use limited   incontinence pads utilized   tubing/devices free from skin contact  Taken 8/7/2023 0200 by Corrine Hernandez RN  Body Position: position changed independently  Skin Protection:   adhesive use limited   incontinence pads utilized   tubing/devices free from skin contact  Taken 8/7/2023 0000 by Corrine Hernandez RN  Body Position: position changed independently  Skin Protection:   adhesive use limited   incontinence pads utilized   tubing/devices free from skin contact  Taken 8/6/2023 2200 by Corrine Hernandez RN  Body Position:   turned   left  Skin Protection:   adhesive use limited   incontinence pads utilized   tubing/devices free from skin contact  Taken 8/6/2023 1942 by Corrine Hernandez RN  Body Position: position changed independently  Skin Protection:   adhesive use limited   incontinence pads utilized   tubing/devices free from skin contact  Intervention: Prevent Infection  Recent Flowsheet Documentation  Taken 8/7/2023 0600 by Corrine Hernandez RN  Infection Prevention:   cohorting utilized   rest/sleep promoted   single patient room provided  Taken 8/7/2023 0400 by Corrine Hernandez RN  Infection Prevention:   cohorting utilized   rest/sleep promoted   single patient room provided  Taken 8/7/2023 0200 by Corrine Hernandez RN  Infection Prevention:   cohorting utilized   rest/sleep promoted   single patient room provided  Taken 8/7/2023 0000 by Corrine Hernandez RN  Infection Prevention:   cohorting utilized   rest/sleep promoted   single patient room provided  Taken 8/6/2023 2200 by Corrine Heranndez RN  Infection Prevention:   cohorting utilized   rest/sleep promoted   single patient room provided  Taken 8/6/2023 1942 by Corrine Hernandez RN  Infection  Prevention:   cohorting utilized   rest/sleep promoted   single patient room provided  Goal: Optimal Comfort and Wellbeing  Outcome: Ongoing, Progressing  Intervention: Provide Person-Centered Care  Recent Flowsheet Documentation  Taken 8/6/2023 1942 by Corrine Hernandez RN  Trust Relationship/Rapport:   care explained   empathic listening provided   thoughts/feelings acknowledged  Goal: Readiness for Transition of Care  Outcome: Ongoing, Progressing     Problem: Adjustment to Illness (Sepsis/Septic Shock)  Goal: Optimal Coping  Outcome: Ongoing, Progressing     Problem: Bleeding (Sepsis/Septic Shock)  Goal: Absence of Bleeding  Outcome: Ongoing, Progressing     Problem: Glycemic Control Impaired (Sepsis/Septic Shock)  Goal: Blood Glucose Level Within Desired Range  Outcome: Ongoing, Progressing  Intervention: Optimize Glycemic Control  Recent Flowsheet Documentation  Taken 8/6/2023 1942 by Corrine Hernandez RN  Glycemic Management: blood glucose monitored     Problem: Infection Progression (Sepsis/Septic Shock)  Goal: Absence of Infection Signs and Symptoms  Outcome: Ongoing, Progressing  Intervention: Initiate Sepsis Management  Recent Flowsheet Documentation  Taken 8/7/2023 0600 by Corrine Hernandez RN  Infection Prevention:   cohorting utilized   rest/sleep promoted   single patient room provided  Taken 8/7/2023 0400 by Corrine Hernandez RN  Infection Prevention:   cohorting utilized   rest/sleep promoted   single patient room provided  Taken 8/7/2023 0200 by Corrine Hernandez RN  Infection Prevention:   cohorting utilized   rest/sleep promoted   single patient room provided  Taken 8/7/2023 0000 by Corrine Hernandez RN  Infection Prevention:   cohorting utilized   rest/sleep promoted   single patient room provided  Taken 8/6/2023 2200 by Corrine Hernandez RN  Infection Prevention:   cohorting utilized   rest/sleep promoted   single patient room provided  Taken 8/6/2023 1942 by Mray  Corrine RN  Infection Prevention:   cohorting utilized   rest/sleep promoted   single patient room provided     Problem: Nutrition Impaired (Sepsis/Septic Shock)  Goal: Optimal Nutrition Intake  Outcome: Ongoing, Progressing     Problem: Confusion Acute  Goal: Optimal Cognitive Function  Outcome: Ongoing, Progressing

## 2023-08-07 NOTE — PROGRESS NOTES
[facility]    INFECTIOUS DISEASE PROGRESS NOTE    Omkar Nava  1955  [id number]    Consult: 8/1/23    Admit date: 7/31/2023    Requesting Provider: No ref. provider found  Evaluating physician: Rayray Gordon MD  Reason for Consultation: Fever, sepsis, confusion  Chief Complaint: Above      Subjective   History of present illness:  Patient is a  67 y.o.  Yr old male With a history of diabetes mellitus type 2, peripheral neuropathy, history of alcohol and cocaine and marijuana use, essential hypertension, GERD, psychiatric issues in the past, left foot osteomyelitis status post transmetatarsal amputation December 2022, status post IV antibiotics and oral which completed around January 2023.  Patient was at the nursing home and became increasingly confused and was brought in by daughter and admitted to [facility] on 7/31/2023.  Patient is a poor historian.  He was documented to have a fever of greater than 102.  Urinalysis had hematuria without significant pyuria.  Chest x-ray said possible left lower lobe atelectasis.  CT scan of the brain without acute changes.  CT scan of the abdomen and pelvis with some bladder wall thickening.  I was consulted on 8/1/2023 for further evaluation and treatment.    8/2/2023 history reviewed.  Mental status is down to his baseline which is oftentimes confused.  Tolerating vancomycin, ceftriaxone, and acyclovir but stopping acyclovir today.    8/3/2023 history reviewed.  No high fevers or chills.  Tolerating vancomycin and ceftriaxone.  Cultures have been negative.  No urine culture sent.    8/4/2023 history reviewed.  No fever.  Feeling better.  Ate some [employer] takeout food.  Cultures are negative.  Tolerating cefuroxime and doxycycline until 8/7.    8/7/2023 history reviewed.  Cefuroxime and doxycycline ending soon.  No high fever.  No pain.    Past Medical History:   Diagnosis Date    Anemia     Dementia     Diabetes  mellitus     Dysphagia     GERD (gastroesophageal reflux disease)     History of alcohol abuse     History of cocaine use     History of marijuana use     Hypertension     Osteomyelitis     Poor historian     records obtained from nursing home records & his family    Visual impairment        Past Surgical History:   Procedure Laterality Date    AMPUTATION FOOT Left 10/18/2022    Procedure: PARTIAL FIRST RAY AMPUTATION LEFT;  Surgeon: Yeison Petty MD;  Location:  [facility];  Service: Orthopedics;  Laterality: Left;    AMPUTATION FOOT Left 12/5/2022    Procedure: Transmetatarsal of Left Foot;  Surgeon: Yeison Petty MD;  Location:  [facility];  Service: Orthopedics;  Laterality: Left;    EYE SURGERY         Pediatric History   Patient Parents    Not on file     Other Topics Concern    Not on file   Social History Narrative    Caffeine 0-1 servings per day    Patient lives at home .   Nursing home resident, no current alcohol or drug use or cigarette use    family history includes Diabetes in his brother, brother, father, maternal grandfather, maternal grandmother, mother, and sister; Hypertension in his brother, brother, father, and mother.    No Known Allergies    Immunization History   Administered Date(s) Administered    COVID-19 (MODERNA) 1st,2nd,3rd Dose Monovalent 06/09/2021, 07/19/2021    COVID-19 (MODERNA) Monovalent Original Booster 04/11/2022       Medication:    Current Facility-Administered Medications:     acetaminophen (TYLENOL) tablet 650 mg, 650 mg, Oral, Q4H PRN, Sarah Gann, , 650 mg at 08/06/23 2152    apixaban (ELIQUIS) tablet 2.5 mg, 2.5 mg, Oral, Q12H, Yash Danielson MD, 2.5 mg at 08/07/23 0823    sennosides-docusate (PERICOLACE) 8.6-50 MG per tablet 2 tablet, 2 tablet, Oral, BID, 2 tablet at 08/06/23 0918 **AND** polyethylene glycol (MIRALAX) packet 17 g, 17 g, Oral, Daily PRN **AND** bisacodyl (DULCOLAX) EC tablet 5 mg, 5 mg, Oral, Daily PRN **AND** bisacodyl (DULCOLAX) suppository 10  mg, 10 mg, Rectal, Daily PRN, Sarah Gann DO    brimonidine (ALPHAGAN) 0.2 % ophthalmic solution 1 drop, 1 drop, Both Eyes, TID, 1 drop at 08/07/23 0828 **AND** timolol (TIMOPTIC) 0.5 % ophthalmic solution 1 drop, 1 drop, Both Eyes, BID, Sarah Gann, , 1 drop at 08/07/23 0829    cefuroxime (CEFTIN) tablet 500 mg, 500 mg, Oral, Q12H, Rayray Gordon MD, 500 mg at 08/07/23 0823    dextrose (D50W) (25 g/50 mL) IV injection 25 g, 25 g, Intravenous, Q15 Min PRN, Sarah Gann DO    dextrose (GLUTOSE) oral gel 15 g, 15 g, Oral, Q15 Min PRN, Sarah Gann DO    divalproex (DEPAKOTE) DR tablet 250 mg, 250 mg, Oral, BID, Sarah Gann DO, 250 mg at 08/07/23 0824    doxycycline (MONODOX) capsule 100 mg, 100 mg, Oral, Q12H, Rayray Gordon MD, 100 mg at 08/07/23 0823    famotidine (PEPCID) tablet 20 mg, 20 mg, Oral, BID, Sarah Gann DO, 20 mg at 08/07/23 0824    ferrous sulfate tablet 325 mg, 325 mg, Oral, Daily With Breakfast, Sarah Gann DO, 325 mg at 08/07/23 0824    glucagon (GLUCAGEN) injection 1 mg, 1 mg, Intramuscular, Q15 Min PRN, Sarah Gann DO    hydrALAZINE (APRESOLINE) injection 10 mg, 10 mg, Intravenous, Q6H PRN, Sarah Gann DO, 10 mg at 08/05/23 1930    hydrALAZINE (APRESOLINE) tablet 75 mg, 75 mg, Oral, Q8H, Chely Lion APRN    hydrOXYzine (ATARAX) tablet 50 mg, 50 mg, Oral, Q6H PRN, Sarah Gann DO, 50 mg at 08/05/23 1339    insulin detemir (LEVEMIR) injection 5 Units, 5 Units, Subcutaneous, Nightly, Chely Lion, APRN, 5 Units at 08/06/23 2049    Insulin Lispro (humaLOG) injection 2-7 Units, 2-7 Units, Subcutaneous, 4x Daily AC & at Bedtime, Sarah Gann, , 4 Units at 08/07/23 0824    Insulin Lispro (humaLOG) injection 5 Units, 5 Units, Subcutaneous, TID With Meals, Chely Lion, APRN, 5 Units at 08/07/23 0824    lisinopril (PRINIVIL,ZESTRIL) tablet 40 mg, 40 mg, Oral, Daily, Sarah Gann DO, 40 mg at  08/07/23 0824    melatonin tablet 5 mg, 5 mg, Oral, Nightly, Sarah Gann, DO, 5 mg at 08/06/23 2048    ondansetron (ZOFRAN) injection 4 mg, 4 mg, Intravenous, Q6H PRN, Sarah Gann R, DO    povidone-iodine 10 % swab 1 each, 1 application , Topical, Daily, Yash Danielson MD, 1 each at 08/06/23 0921    [COMPLETED] Insert Peripheral IV, , , Once **AND** sodium chloride 0.9 % flush 10 mL, 10 mL, Intravenous, PRN, Sarah Gann R, DO    sodium chloride 0.9 % flush 10 mL, 10 mL, Intravenous, Q12H, Sarah Gann R, DO, 10 mL at 08/07/23 0830    sodium chloride 0.9 % flush 10 mL, 10 mL, Intravenous, PRN, Sarah Gann R, DO    sodium chloride 0.9 % infusion 40 mL, 40 mL, Intravenous, PRN, Sarah Gann R, DO    sodium chloride nasal spray 2 spray, 2 spray, Each Nare, PRN, Yash Danielson MD    thiamine (VITAMIN B-1) tablet 100 mg, 100 mg, Oral, TID, Sarah Gann, DO, 100 mg at 08/07/23 0824    ziprasidone (GEODON) injection 10 mg, 10 mg, Intramuscular, Q4H PRN, Sarah Gann R, DO    Please refer to the medical record for a full medication list    Review of Systems: Difficult to obtain secondary to mental status.    Constitutional-- No Fever, chills or sweats.  Appetite good, and no malaise. No fatigue.  HEENT-- No new vision, hearing or throat complaints.  No epistaxis or oral sores.  Denies odynophagia or dysphagia.  No odynophagia or dysphagia. No headache, photophobia or neck stiffness.  CV-- No chest pain, palpitation or syncope  Resp-- No SOB/cough/Hemoptysis  GI- No nausea, vomiting, or diarrhea.  No hematochezia, melena, or hematemesis. Denies jaundice or chronic liver disease.  -- No dysuria, hematuria, or flank pain.  Denies hesitancy, urgency or flank pain.  Lymph- no swollen lymph nodes in neck/axilla or groin.   Heme- No active bruising or bleeding; no Hx of DVT or PE.  MS-- no swelling or pain in the bones or joints of arms/legs.  No new back pain.  Neuro-- No acute focal weakness  "or numbness in the arms or legs.  No seizures.  Skin--No rashes or lesions, no nodules    Physical Exam:   Vital Signs   Temp:  [96.2 øF (35.7 øC)-98.1 øF (36.7 øC)] 97.5 øF (36.4 øC)  Heart Rate:  [64-78] 67  Resp:  [18] 18  BP: (159-187)/(65-90) 187/85    Temp  Min: 96.2 øF (35.7 øC)  Max: 98.1 øF (36.7 øC)  BP  Min: 159/65  Max: 187/85  Pulse  Min: 64  Max: 78  Resp  Min: 18  Max: 18  SpO2  Min: 93 %  Max: 97 %    Blood pressure (!) 187/85, pulse 67, temperature 97.5 øF (36.4 øC), temperature source Axillary, resp. rate 18, height 152.4 cm (60\"), weight 59.4 kg (131 lb), SpO2 93 %.  GENERAL: Awake and answers questions appropriately, in mild distress. Appears older than stated age.  Resting in bed.  HEENT:  Normocephalic, atraumatic.  Oropharynx without thrush. Dentition in good repair. No cervical adenopathy. No neck masses.  Ears externally normal, Nose externally normal.  EYES: No conjunctival injection. No icterus. EOM full.  LYMPHATICS: No lymphadenopathy of the neck or axillary or inguinal regions.   HEART: No murmur, gallop, or pericardial friction rub. Reg rate rhythm, No JVD at 45 degrees.  LUNGS: Occasional rales left base. No respiratory distress, no use of accessory muscles.  ABDOMEN: Soft, nontender, nondistended. No appreciable HSM.  Bowel sounds normal.  SKIN: Warm and dry without cutaneous eruptions.  No nodules.  Left foot well-healed transmetatarsal amputation site.  PSYCHIATRIC: Mental status lucid. No confusion.  EXT:  No cellulitic change.  NEURO: Oriented to name, nonfocal    Results Review:   I reviewed the patient's new clinical results.  I reviewed the patient's new imaging results and agree with the interpretation.  I reviewed the patient's other test results and agree with the interpretation    Results from last 7 days   Lab Units 08/05/23  0635 08/03/23  0424 08/02/23  1023   WBC 10*3/mm3 6.17 6.41 8.46   HEMOGLOBIN g/dL 7.9* 8.0* 8.9*   HEMATOCRIT % 24.2* 25.1* 27.6*   PLATELETS " 10*3/mm3 222 205 236       Results from last 7 days   Lab Units 08/03/23  0424   SODIUM mmol/L 140   POTASSIUM mmol/L 4.0   CHLORIDE mmol/L 105   CO2 mmol/L 25.0   BUN mg/dL 18   CREATININE mg/dL 1.24   GLUCOSE mg/dL 177*   CALCIUM mg/dL 8.1*       Results from last 7 days   Lab Units 08/03/23  0424   ALK PHOS U/L 67   BILIRUBIN mg/dL <0.2   ALT (SGPT) U/L 15   AST (SGOT) U/L 13           Results from last 7 days   Lab Units 08/02/23  1023   CRP mg/dL 0.59*       Results from last 7 days   Lab Units 08/02/23  1023   VANCOMYCIN RM mcg/mL 21.50       Results from last 7 days   Lab Units 08/03/23  0424   LACTATE mmol/L 0.9       Estimated Creatinine Clearance: 49.9 mL/min (by C-G formula based on SCr of 1.24 mg/dL).  CPK          8/2/2023    10:23   Common Labsle   Creatine Kinase 463       Procalitonin Results:      Lab 08/02/23  1023 07/31/23  1231   PROCALCITONIN 0.09 0.05        Brief Urine Lab Results  (Last result in the past 365 days)        Color   Clarity   Blood   Leuk Est   Nitrite   Protein   CREAT   Urine HCG        08/02/23 1319 Yellow   Clear   Negative   Negative   Negative   30 mg/dL (1+)                  No results found for: SITE, ALLENTEST, PHART, JCT7VKU, PO2ART, NNV4MEO, BASEEXCESS, A3PEBITC, HGBBG, HCTABG, OXYHEMOGLOBI, METHHGBN, CARBOXYHGB, CO2CT, BAROMETRIC, MODALITY, FIO2     Microbiology:  Blood Culture   Date Value Ref Range Status   07/31/2023 No growth at 24 hours  Preliminary   07/31/2023 No growth at 24 hours  Preliminary     No results found for: BCIDPCR, CXREFLEX, CSFCX, CULTURETIS  No results found for: CULTURES, HSVCX, URCX  No results found for: EYECULTURE, GCCX, HSVCULTURE, LABHSV  No results found for: LEGIONELLA, MRSACX, MUMPSCX, MYCOPLASCX  No results found for: NOCARDIACX, STOOLCX  No results found for: THROATCX, UNSTIMCULT, URINECX, CULTURE, VZVCULTUR  No results found for: VIRALCULTU, WOUNDCX     Blood Culture   Date Value Ref Range Status   07/31/2023 No growth at 24 hours   Preliminary   07/31/2023 No growth at 24 hours  Preliminary   (      Radiology:  Imaging Results (Last 72 Hours)       ** No results found for the last 72 hours. **            IMPRESSION:     1.  Sepsis, present on admission, sources could include a cystitis with hematuria but no significant pyuria, less likely aspiration pneumonia left lower lobe versus atelectasis, possible encephalitis not seen on CT scan, and unable to get lumbar puncture secondary to lack of cooperation, versus other.  Usual organisms would include Staphylococcus, gram-negative rods, HSV.  Clinically resolved.  2.  Encephalopathy, toxic and metabolic.  Also positive drug screen for benzodiazepines.  He also has some baseline psychiatric issues.  Clinically improved.  2a.  Doubt encephalitis.  3.  Leukocytosis, neutrophilic related to above issues.  Resolved.  4.  Anemia, chronic disease.  Minimally worse.  5.  Hematuria, related to cystitis versus other.  6.  Diabetes mellitus type 2 with increased risk for infection.  7.  Hypocalcemia 8.1, continues.    Plan:    1.  Diagnostically, continue to follow patient's physical exam, CBC, CMP, CRP.  Urine culture ordered and not obtained.  2.  Therapeutically, continue doxycycline and cefuroxime until 8/7/2023, then follow off antibiotics.  3.  Supportive care.    I discussed the patient's findings and my recommendations with patient and nursing staff    Thank you for asking me to see Omkar Nava.  Our group would be pleased to follow this patient over the course of their hospitalization and assist with outpatient antimicrobial therapy, as indicated.  Further recommendations depend on the results of the cultures and clinical course.  Increased risk for adverse drug reactions, complications of IV access, readmission.  I discussed with the patient's family.  Discussed previously with Dr. Danielson.      Rayray Gordon MD  8/7/2023

## 2023-08-07 NOTE — PROGRESS NOTES
"                  Clinical Nutrition   Nutrition Support Assessment  Reason for Visit: [facility]      Patient Name: Omkar Nava  YOB: 1955  MRN: [id number]  Date of Encounter: 08/07/23 19:21 EDT  Admission date: 7/31/2023    Comments: Wt hx unclear Per exam does not meet criteria for malnutrition dx at this time.    Nutrition Assessment   Admission Diagnosis:  AMS (altered mental status) [R41.82]      Problem List:    AMS (altered mental status)    Type 2 diabetes mellitus    Essential hypertension    Psychotic disorder    Neurocognitive disorder    S/P transmetatarsal amputation of foot, left        PMH:   He  has a past medical history of Anemia, Dementia, Diabetes mellitus, Dysphagia, GERD (gastroesophageal reflux disease), History of alcohol abuse, History of cocaine use, History of marijuana use, Hypertension, Osteomyelitis, Poor historian, and Visual impairment.    PSH:  He  has a past surgical history that includes Eye surgery; Foot Amputation (Left, 10/18/2022); and Foot Amputation (Left, 12/5/2022).    Applicable Nutrition Concerns:   Skin:  Oral:  GI:    Applicable Interval History:   8/6 SLP rec soft to chew whole food, thin liquid      Reported/Observed/Food/Nutrition Related History:     8/7  Pt rpts his current wt is 178 lbs from a few days ago. Rpts intake at home was ok. Able to give menu choices.        Anthropometrics     Flowsheet Rows      Flowsheet Row First Filed Value   Admission Height 182.9 cm (72\") Documented at 07/31/2023 1108   Admission Weight 85.7 kg (189 lb) Documented at 07/31/2023 1108     Height: Height: 152.4 cm (60\")  Last Filed Weight: Weight: 59.4 kg (131 lb) (08/04/23 0339)  Method: Weight Method: Stated ? accuracy and ? if this was the stated wt.  BMI: BMI (Calculated): 25.6  BMI classification: Overweight: 25.0-29.9kg/m2     UBW:  unable to determine at this time. Per EMR wt of 191 lbs on 11/24/22 - same on 3/6/23 and stated wt of 189 lbs on 7/31/23 Pt now rpts " current wt of 178 lbs ? when taken.  Weight change:  If the pt reported current wt of 178 lbs is accurate and 191 lbs was accurate on 3/6/23:  Loss of 13 lbs 7% body wt over ? 5 months. Accuracy unk.    Nutrition Focused Physical Exam     Date:    8/7     NFPE completed, patient does not meet criteria for MSA at this time.     Subcutaneous fat:  Orbital No fat loss identified   Triceps/Biceps No fat loss identified   Thoracic and lumbar region- ribs lower back, midaxillary line No fat loss identified     Muscle:  Temple/temporalis Mild   Clavicle- pectoralis, deltoid, trapezius Mild   Clavicle and acromion process  No muscle loss identified   Scapular bone region No muscle loss identified   Dorsal hand Unable to determine at this time   Patellar  Unable to determine at this time   Quadriceps Unable to determine at this time   Calf Unable to determine at this time     Current Nutrition Prescription   PO: Diet: Cardiac Diets, Diabetic Diets; Healthy Heart (2-3 Na+); Consistent Carbohydrate; Texture: Soft to Chew (NDD 3); Soft to Chew: Whole Meat; Fluid Consistency: Thin (IDDSI 0)    Intake:  7 DAYS  67% x 16 meals recorded However 79% x last 7 meals recorded over 3 days.      Nutrition Diagnosis   Date:              Updated:    Problem No nutrition diagnosis at this time w current data   Etiology    Signs/Symptoms    Status:       Goal:   General: Nutrition to support treatment  PO: Maintain intake, Continue positive trend  EN/PN: N/A    Nutrition Intervention      Follow treatment progress, Care plan reviewed, Advise alternate selection, Menu provided        Monitoring/Evaluation:   Per protocol, I&O, PO intake, Pertinent labs, Weight, Symptoms      Melissa Serrano RD  Time Spent: 25 min

## 2023-08-07 NOTE — CASE MANAGEMENT/SOCIAL WORK
Continued Stay Note  [facility]     Patient Name: Omkar Nava  MRN: [id number]  Today's Date: 8/7/2023    Admit Date: 7/31/2023    Plan: LTC   Discharge Plan       Row Name 08/07/23 1602       Plan    Plan LTC    Patient/Family in Agreement with Plan yes    Plan Comments Patient discharge plan is back to [facility]. Daughter doesn't want to take him back there. CM back referrals. Daughter wanted [facility]. CM called Elizabeth with the [facility] and spoke to her about patient. They cannot take patient for SNF or LTC bed placement. Patient can't be place anywhere if requiring IV medication. CM will discuss this with daughter. CM will follow.    Final Discharge Disposition Code 04 - intermediate care facility                   Discharge Codes    No documentation.                 Expected Discharge Date and Time       Expected Discharge Date Expected Discharge Time    Aug 8, 2023               Edie Ochoa RN

## 2023-08-07 NOTE — THERAPY TREATMENT NOTE
Patient Name: Omkar Nava  : 1955    MRN: [id number]                              Today's Date: 2023       Admit Date: 2023    Visit Dx:     ICD-10-CM ICD-9-CM   1. Febrile illness  R50.9 780.60   2. 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, without long-term current use of insulin    Orthostatic hypotension    Acute osteomyelitis of left foot    Type 2 diabetes mellitus    Essential hypertension    Psychotic disorder    Neurocognitive disorder    S/P transmetatarsal amputation of foot, left    AMS (altered mental status)     Past Medical History:   Diagnosis Date    Anemia     Dementia     Diabetes mellitus     Dysphagia     GERD (gastroesophageal reflux disease)     History of alcohol abuse     History of cocaine use     History of marijuana use     Hypertension     Osteomyelitis     Poor historian     records obtained from nursing home records & his family    Visual impairment      Past Surgical History:   Procedure Laterality Date    AMPUTATION FOOT Left 10/18/2022    Procedure: PARTIAL FIRST RAY AMPUTATION LEFT;  Surgeon: Yeison Petty MD;  Location:  [facility] OR;  Service: Orthopedics;  Laterality: Left;    AMPUTATION FOOT Left 2022    Procedure: Transmetatarsal of Left Foot;  Surgeon: Yeison Petty MD;  Location: [facility] OR;  Service: Orthopedics;  Laterality: Left;    EYE SURGERY        General Information       Row Name 23 1308          Physical Therapy Time and Intention    Document Type therapy note (daily note)  -NS     Mode of Treatment physical therapy  -NS       Row Name 23 1308          General Information    Patient Profile Reviewed yes  -NS     Existing Precautions/Restrictions fall;other (see comments)  dementia; remote L transmet  amputation; visual impairment; bowel incontinence  -NS       Row Name 08/07/23 1308          Cognition    Orientation Status (Cognition) oriented to;person;disoriented to;place;time  -NS       Row Name 08/07/23 1308          Safety Issues, Functional Mobility    Safety Issues Affecting Function (Mobility) insight into deficits/self-awareness;judgment;problem-solving;safety precaution awareness;safety precautions follow-through/compliance;sequencing abilities  -NS     Impairments Affecting Function (Mobility) balance;cognition;coordination;endurance/activity tolerance;visual/perceptual;motor planning;postural/trunk control;strength  -NS     Cognitive Impairments, Mobility Safety/Performance problem-solving/reasoning;safety precaution awareness;safety precaution follow-through;sequencing abilities;judgment  -NS               User Key  (r) = Recorded By, (t) = Taken By, (c) = Cosigned By      Initials Name Provider Type    Maggi Weiss, PT Physical Therapist                   Mobility       Row Name 08/07/23 1308          Bed Mobility    Bed Mobility supine-sit  -NS     Supine-Sit [place] (Bed Mobility) minimum assist (75% patient effort);verbal cues  -NS     Assistive Device (Bed Mobility) bed rails;head of bed elevated  -NS     Comment, (Bed Mobility) Cues for sequencing, assist at trunk  -NS       Row Name 08/07/23 1308          Transfers    Comment, (Transfers) Cues for hand and foot placement. Pt demonstrated difficulty sequencing side steps to chair, needing cues for backing up fully prior to sitting. Dep for pericare.  -NS       Row Name 08/07/23 1308          Bed-Chair Transfer    Bed-Chair [place] (Transfers) moderate assist (50% patient effort);2 person assist  -NS     Assistive Device (Bed-Chair Transfers) other (see comments)  BUE support  -NS       Row Name 08/07/23 1308          Sit-Stand Transfer    Sit-Stand [place] (Transfers) minimum assist (75% patient effort);2 person assist  -NS      Assistive Device (Sit-Stand Transfers) other (see comments);walker, front-wheeled  BUE support  -NS               User Key  (r) = Recorded By, (t) = Taken By, (c) = Cosigned By      Initials Name Provider Type    Maggi Weiss PT Physical Therapist                   Obj/Interventions       Row Name 08/07/23 1308          Balance    Balance Assessment sitting static balance;sitting dynamic balance;standing static balance;standing dynamic balance  -NS     Static Sitting Balance contact guard  -NS     Dynamic Sitting Balance contact guard  -NS     Position, Sitting Balance unsupported;sitting edge of bed  -NS     Static Standing Balance minimal assist;2-person assist  -NS     Dynamic Standing Balance moderate assist;2-person assist  -NS     Position/Device Used, Standing Balance supported  -NS               User Key  (r) = Recorded By, (t) = Taken By, (c) = Cosigned By      Initials Name Provider Type    Maggi Weiss PT Physical Therapist                   Goals/Plan    No documentation.                  Clinical Impression       Row Name 08/07/23 1308          Pain    Pretreatment Pain Rating 0/10 - no pain  -NS     Posttreatment Pain Rating 0/10 - no pain  -NS       Row Name 08/07/23 1308          Plan of Care Review    Plan of Care Reviewed With patient  -NS     Progress improving  -NS     Outcome Evaluation Patient required less assist to stand today but continues to require verbal and tactile cues for sequencing mobility. Continue skilled IP PT services to support return to baseline. Recommend SNF at d/c.  -NS       Row Name 08/07/23 1308          Vital Signs    Pre Systolic BP Rehab --  VSS- RN cleared for PT treatment  -NS     Pre Patient Position Supine  -NS     Intra Patient Position Standing  -NS     Post Patient Position Sitting  -NS       Row Name 08/07/23 1308          Positioning and Restraints    Pre-Treatment Position in bed  -NS     Post Treatment Position chair  -NS     In Chair notified  nsg;reclined;call light within reach;encouraged to call for assist;exit alarm on;on mechanical lift sling;waffle cushion;legs elevated;heels elevated  -NS               User Key  (r) = Recorded By, (t) = Taken By, (c) = Cosigned By      Initials Name Provider Type    Maggi Weiss PT Physical Therapist                   Outcome Measures       Row Name 08/07/23 1308          How much help from another person do you currently need...    Turning from your back to your side while in flat bed without using bedrails? 3  -NS     Moving from lying on back to sitting on the side of a flat bed without bedrails? 2  -NS     Moving to and from a bed to a chair (including a wheelchair)? 2  -NS     Standing up from a chair using your arms (e.g., wheelchair, bedside chair)? 3  -NS     Climbing 3-5 steps with a railing? 1  -NS     To walk in hospital room? 2  -NS     AM-PAC 6 Clicks Score (PT) 13  -NS     Highest level of mobility 4 --> Transferred to chair/commode  -NS       Row Name 08/07/23 1308          Functional Assessment    Outcome Measure Options AM-PAC 6 Clicks Basic Mobility (PT)  -NS               User Key  (r) = Recorded By, (t) = Taken By, (c) = Cosigned By      Initials Name Provider Type    Maggi Weiss PT Physical Therapist                                 Physical Therapy Education       Title: PT OT SLP Therapies (In Progress)       Topic: Physical Therapy (Done)       Point: Mobility training (Done)       Learning Progress Summary             Patient Acceptance, E, VU,NR by NS at 8/7/2023 1418    Acceptance, E, NR by KG at 8/4/2023 0911                         Point: Home exercise program (Done)       Learning Progress Summary             Patient Acceptance, E, VU,NR by NS at 8/7/2023 1418                         Point: Body mechanics (Done)       Learning Progress Summary             Patient Acceptance, E, VU,NR by NS at 8/7/2023 1418    Acceptance, E, NR by KG at 8/4/2023 0911                          Point: Precautions (Done)       Learning Progress Summary             Patient Acceptance, E, VU,NR by NS at 8/7/2023 1418    Acceptance, E, NR by KG at 8/4/2023 0911                                         User Key       Initials Effective Dates Name Provider Type Discipline    KG 05/22/20 -  Micaela Kaplan PT Physical Therapist PT    NS 06/16/21 -  Maggi Jade PT Physical Therapist PT                  PT Recommendation and Plan     Plan of Care Reviewed With: patient  Progress: improving  Outcome Evaluation: Patient required less assist to stand today but continues to require verbal and tactile cues for sequencing mobility. Continue skilled IP PT services to support return to baseline. Recommend SNF at d/c.     Time Calculation:         PT Charges       Row Name 08/07/23 1308             Time Calculation    Start Time 1308  -NS      PT Received On 08/07/23  -NS      PT Goal Re-Cert Due Date 08/14/23  -NS         Timed Charges    65469 - PT Therapeutic Exercise Minutes 9  -NS      45556 - PT Therapeutic Activity Minutes 21  -NS         Total Minutes    Timed Charges Total Minutes 30  -NS       Total Minutes 30  -NS                User Key  (r) = Recorded By, (t) = Taken By, (c) = Cosigned By      Initials Name Provider Type    NS Maggi Jade, PT Physical Therapist                  Therapy Charges for Today       Code Description Service Date Service Provider Modifiers Qty    69537287323 HC PT THERAPEUTIC ACT EA 15 MIN 8/7/2023 Maggi Jade, PT GP 1    95493564083 HC PT THER PROC EA 15 MIN 8/7/2023 Maggi Jade, PT GP 1    69243992658 HC PT THER SUPP EA 15 MIN 8/7/2023 Maggi Jade, PT GP 2            PT G-Codes  Outcome Measure Options: AM-PAC 6 Clicks Basic Mobility (PT)  AM-PAC 6 Clicks Score (PT): 13  AM-PAC 6 Clicks Score (OT): 11  PT Discharge Summary  Anticipated Discharge Disposition (PT): skilled nursing facility    Maggi Jade PT  8/7/2023

## 2023-08-07 NOTE — PLAN OF CARE
Goal Outcome Evaluation:  Plan of Care Reviewed With: patient        Progress: improving  Outcome Evaluation: Patient required less assist to stand today but continues to require verbal and tactile cues for sequencing mobility. Continue skilled IP PT services to support return to baseline. Recommend SNF at d/c.      Anticipated Discharge Disposition (PT): skilled nursing facility

## 2023-08-08 LAB
GLUCOSE BLDC GLUCOMTR-MCNC: 127 MG/DL (ref 70–130)
GLUCOSE BLDC GLUCOMTR-MCNC: 133 MG/DL (ref 70–130)
GLUCOSE BLDC GLUCOMTR-MCNC: 174 MG/DL (ref 70–130)
GLUCOSE BLDC GLUCOMTR-MCNC: 318 MG/DL (ref 70–130)

## 2023-08-08 PROCEDURE — 25010000002 HYDRALAZINE PER 20 MG: Performed by: INTERNAL MEDICINE

## 2023-08-08 PROCEDURE — 99232 SBSQ HOSP IP/OBS MODERATE 35: CPT | Performed by: NURSE PRACTITIONER

## 2023-08-08 PROCEDURE — 63710000001 INSULIN DETEMIR PER 5 UNITS: Performed by: NURSE PRACTITIONER

## 2023-08-08 PROCEDURE — 93010 ELECTROCARDIOGRAM REPORT: CPT | Performed by: INTERNAL MEDICINE

## 2023-08-08 PROCEDURE — 82948 REAGENT STRIP/BLOOD GLUCOSE: CPT

## 2023-08-08 PROCEDURE — 25010000002 HALOPERIDOL LACTATE PER 5 MG: Performed by: INTERNAL MEDICINE

## 2023-08-08 PROCEDURE — 93005 ELECTROCARDIOGRAM TRACING: CPT | Performed by: INTERNAL MEDICINE

## 2023-08-08 PROCEDURE — 63710000001 INSULIN LISPRO (HUMAN) PER 5 UNITS: Performed by: INTERNAL MEDICINE

## 2023-08-08 PROCEDURE — 63710000001 INSULIN LISPRO (HUMAN) PER 5 UNITS: Performed by: NURSE PRACTITIONER

## 2023-08-08 RX ORDER — QUETIAPINE FUMARATE 25 MG/1
25 TABLET, FILM COATED ORAL NIGHTLY
Status: COMPLETED | OUTPATIENT
Start: 2023-08-08 | End: 2023-08-08

## 2023-08-08 RX ORDER — AMLODIPINE BESYLATE 5 MG/1
5 TABLET ORAL
Status: DISCONTINUED | OUTPATIENT
Start: 2023-08-08 | End: 2023-08-09 | Stop reason: HOSPADM

## 2023-08-08 RX ORDER — HALOPERIDOL 5 MG/ML
2 INJECTION INTRAMUSCULAR ONCE
Status: COMPLETED | OUTPATIENT
Start: 2023-08-08 | End: 2023-08-08

## 2023-08-08 RX ORDER — CARVEDILOL 3.12 MG/1
3.12 TABLET ORAL 2 TIMES DAILY WITH MEALS
Status: DISCONTINUED | OUTPATIENT
Start: 2023-08-08 | End: 2023-08-08

## 2023-08-08 RX ADMIN — OXYCODONE HYDROCHLORIDE AND ACETAMINOPHEN 500 MG: 500 TABLET ORAL at 09:18

## 2023-08-08 RX ADMIN — TIMOLOL MALEATE 1 DROP: 5 SOLUTION/ DROPS OPHTHALMIC at 09:22

## 2023-08-08 RX ADMIN — CEFUROXIME AXETIL 500 MG: 250 TABLET, FILM COATED ORAL at 09:18

## 2023-08-08 RX ADMIN — INSULIN LISPRO 5 UNITS: 100 INJECTION, SOLUTION INTRAVENOUS; SUBCUTANEOUS at 12:48

## 2023-08-08 RX ADMIN — HYDRALAZINE HYDROCHLORIDE 10 MG: 20 INJECTION INTRAMUSCULAR; INTRAVENOUS at 05:45

## 2023-08-08 RX ADMIN — HYDRALAZINE HYDROCHLORIDE 75 MG: 50 TABLET, FILM COATED ORAL at 20:49

## 2023-08-08 RX ADMIN — BRIMONIDINE TARTRATE 1 DROP: 2 SOLUTION OPHTHALMIC at 09:22

## 2023-08-08 RX ADMIN — DOXYCYCLINE 100 MG: 100 CAPSULE ORAL at 09:18

## 2023-08-08 RX ADMIN — APIXABAN 2.5 MG: 2.5 TABLET, FILM COATED ORAL at 20:36

## 2023-08-08 RX ADMIN — AMLODIPINE BESYLATE 5 MG: 5 TABLET ORAL at 14:32

## 2023-08-08 RX ADMIN — Medication 5 MG: at 20:36

## 2023-08-08 RX ADMIN — APIXABAN 2.5 MG: 2.5 TABLET, FILM COATED ORAL at 09:18

## 2023-08-08 RX ADMIN — FAMOTIDINE 20 MG: 20 TABLET, FILM COATED ORAL at 09:18

## 2023-08-08 RX ADMIN — FAMOTIDINE 20 MG: 20 TABLET, FILM COATED ORAL at 20:36

## 2023-08-08 RX ADMIN — LISINOPRIL 40 MG: 40 TABLET ORAL at 10:13

## 2023-08-08 RX ADMIN — FERROUS SULFATE TAB 325 MG (65 MG ELEMENTAL FE) 325 MG: 325 (65 FE) TAB at 09:30

## 2023-08-08 RX ADMIN — INSULIN LISPRO 5 UNITS: 100 INJECTION, SOLUTION INTRAVENOUS; SUBCUTANEOUS at 17:34

## 2023-08-08 RX ADMIN — POVIDONE-IODINE 1 EACH: 10 SOLUTION TOPICAL at 10:14

## 2023-08-08 RX ADMIN — INSULIN DETEMIR 5 UNITS: 100 INJECTION, SOLUTION SUBCUTANEOUS at 09:31

## 2023-08-08 RX ADMIN — THIAMINE HCL TAB 100 MG 100 MG: 100 TAB at 20:36

## 2023-08-08 RX ADMIN — Medication 10 ML: at 10:14

## 2023-08-08 RX ADMIN — BRIMONIDINE TARTRATE 1 DROP: 2 SOLUTION OPHTHALMIC at 20:49

## 2023-08-08 RX ADMIN — HALOPERIDOL LACTATE 2 MG: 5 INJECTION, SOLUTION INTRAMUSCULAR at 07:59

## 2023-08-08 RX ADMIN — INSULIN DETEMIR 5 UNITS: 100 INJECTION, SOLUTION SUBCUTANEOUS at 20:35

## 2023-08-08 RX ADMIN — TIMOLOL MALEATE 1 DROP: 5 SOLUTION/ DROPS OPHTHALMIC at 20:49

## 2023-08-08 RX ADMIN — QUETIAPINE FUMARATE 25 MG: 25 TABLET ORAL at 18:40

## 2023-08-08 RX ADMIN — HYDRALAZINE HYDROCHLORIDE 75 MG: 50 TABLET, FILM COATED ORAL at 14:32

## 2023-08-08 RX ADMIN — INSULIN LISPRO 2 UNITS: 100 INJECTION, SOLUTION INTRAVENOUS; SUBCUTANEOUS at 09:31

## 2023-08-08 RX ADMIN — BRIMONIDINE TARTRATE 1 DROP: 2 SOLUTION OPHTHALMIC at 17:35

## 2023-08-08 RX ADMIN — THIAMINE HCL TAB 100 MG 100 MG: 100 TAB at 09:18

## 2023-08-08 RX ADMIN — Medication 10 ML: at 20:50

## 2023-08-08 RX ADMIN — THIAMINE HCL TAB 100 MG 100 MG: 100 TAB at 17:34

## 2023-08-08 RX ADMIN — INSULIN LISPRO 5 UNITS: 100 INJECTION, SOLUTION INTRAVENOUS; SUBCUTANEOUS at 09:31

## 2023-08-08 RX ADMIN — DIVALPROEX SODIUM 250 MG: 250 TABLET, DELAYED RELEASE ORAL at 09:18

## 2023-08-08 RX ADMIN — DIVALPROEX SODIUM 250 MG: 250 TABLET, DELAYED RELEASE ORAL at 20:36

## 2023-08-08 RX ADMIN — HYDRALAZINE HYDROCHLORIDE 75 MG: 50 TABLET, FILM COATED ORAL at 06:02

## 2023-08-08 NOTE — NURSING NOTE
WOC follow up for right 5th toe.    Area unchanged. In heel boots on assessments. Continue current plan of care of heel boots and betadine painting.     Will sign off.

## 2023-08-08 NOTE — PROGRESS NOTES
[facility]    INFECTIOUS DISEASE PROGRESS NOTE    Omkar Nava  1955  [id number]    Consult: 8/1/23    Admit date: 7/31/2023    Requesting Provider: No ref. provider found  Evaluating physician: Rayray Gordon MD  Reason for Consultation: Fever, sepsis, confusion  Chief Complaint: Above      Subjective   History of present illness:  Patient is a  67 y.o.  Yr old male With a history of diabetes mellitus type 2, peripheral neuropathy, history of alcohol and cocaine and marijuana use, essential hypertension, GERD, psychiatric issues in the past, left foot osteomyelitis status post transmetatarsal amputation December 2022, status post IV antibiotics and oral which completed around January 2023.  Patient was at the nursing home and became increasingly confused and was brought in by daughter and admitted to [facility] on 7/31/2023.  Patient is a poor historian.  He was documented to have a fever of greater than 102.  Urinalysis had hematuria without significant pyuria.  Chest x-ray said possible left lower lobe atelectasis.  CT scan of the brain without acute changes.  CT scan of the abdomen and pelvis with some bladder wall thickening.  I was consulted on 8/1/2023 for further evaluation and treatment.    8/2/2023 history reviewed.  Mental status is down to his baseline which is oftentimes confused.  Tolerating vancomycin, ceftriaxone, and acyclovir but stopping acyclovir today.    8/3/2023 history reviewed.  No high fevers or chills.  Tolerating vancomycin and ceftriaxone.  Cultures have been negative.  No urine culture sent.    8/4/2023 history reviewed.  No fever.  Feeling better.  Ate some [employer] takeout food.  Cultures are negative.  Tolerating cefuroxime and doxycycline until 8/7.    8/7/2023 history reviewed.  Cefuroxime and doxycycline ending soon.  No high fever.  No pain.  8/8/2023 history reviewed.  Stopping cefuroxime and doxycycline.  No high fever.   No pain.  Gets confused and more belligerent at times.    Past Medical History:   Diagnosis Date    Anemia     Dementia     Diabetes mellitus     Dysphagia     GERD (gastroesophageal reflux disease)     History of alcohol abuse     History of cocaine use     History of marijuana use     Hypertension     Osteomyelitis     Poor historian     records obtained from nursing home records & his family    Visual impairment        Past Surgical History:   Procedure Laterality Date    AMPUTATION FOOT Left 10/18/2022    Procedure: PARTIAL FIRST RAY AMPUTATION LEFT;  Surgeon: Yeison Petty MD;  Location: [facility] OR;  Service: Orthopedics;  Laterality: Left;    AMPUTATION FOOT Left 12/5/2022    Procedure: Transmetatarsal of Left Foot;  Surgeon: Yeison Petty MD;  Location:  [facility] OR;  Service: Orthopedics;  Laterality: Left;    EYE SURGERY         Pediatric History   Patient Parents    Not on file     Other Topics Concern    Not on file   Social History Narrative    Caffeine 0-1 servings per day    Patient lives at home .   Nursing home resident, no current alcohol or drug use or cigarette use    family history includes Diabetes in his brother, brother, father, maternal grandfather, maternal grandmother, mother, and sister; Hypertension in his brother, brother, father, and mother.    No Known Allergies    Immunization History   Administered Date(s) Administered    COVID-19 (MODERNA) 1st,2nd,3rd Dose Monovalent 06/09/2021, 07/19/2021    COVID-19 (MODERNA) Monovalent Original Booster 04/11/2022       Medication:    Current Facility-Administered Medications:     acetaminophen (TYLENOL) tablet 650 mg, 650 mg, Oral, Q4H PRN, Sarah Gann DO, 650 mg at 08/06/23 2152    amLODIPine (NORVASC) tablet 5 mg, 5 mg, Oral, Q24H, Jeniffer Winkler APRN, 5 mg at 08/08/23 1432    apixaban (ELIQUIS) tablet 2.5 mg, 2.5 mg, Oral, Q12H, Yash Danielson MD, 2.5 mg at 08/08/23 0918    ascorbic acid (VITAMIN C) tablet 500 mg, 500 mg, Oral,  Daily, Chely Lion, APRN, 500 mg at 08/08/23 0918    sennosides-docusate (PERICOLACE) 8.6-50 MG per tablet 2 tablet, 2 tablet, Oral, BID, 2 tablet at 08/06/23 0918 **AND** polyethylene glycol (MIRALAX) packet 17 g, 17 g, Oral, Daily PRN **AND** bisacodyl (DULCOLAX) EC tablet 5 mg, 5 mg, Oral, Daily PRN **AND** bisacodyl (DULCOLAX) suppository 10 mg, 10 mg, Rectal, Daily PRN, Sarah Gann R, DO    brimonidine (ALPHAGAN) 0.2 % ophthalmic solution 1 drop, 1 drop, Both Eyes, TID, 1 drop at 08/08/23 0922 **AND** timolol (TIMOPTIC) 0.5 % ophthalmic solution 1 drop, 1 drop, Both Eyes, BID, aSrah Gann, DO, 1 drop at 08/08/23 0922    dextrose (D50W) (25 g/50 mL) IV injection 25 g, 25 g, Intravenous, Q15 Min PRN, Sarah Gann R, DO    dextrose (GLUTOSE) oral gel 15 g, 15 g, Oral, Q15 Min PRN, Sarah Gann R, DO    divalproex (DEPAKOTE) DR tablet 250 mg, 250 mg, Oral, BID, Sarah Gann R, DO, 250 mg at 08/08/23 0918    famotidine (PEPCID) tablet 20 mg, 20 mg, Oral, BID, Sarah Gann R, DO, 20 mg at 08/08/23 0918    ferrous sulfate tablet 325 mg, 325 mg, Oral, Daily With Breakfast, Sarah Gann R, DO, 325 mg at 08/08/23 0930    glucagon (GLUCAGEN) injection 1 mg, 1 mg, Intramuscular, Q15 Min PRN, Sarah Gann R, DO    hydrALAZINE (APRESOLINE) injection 10 mg, 10 mg, Intravenous, Q6H PRN, Sarah Gann R, DO, 10 mg at 08/08/23 0545    hydrALAZINE (APRESOLINE) tablet 75 mg, 75 mg, Oral, Q8H, Chely Lion, APRN, 75 mg at 08/08/23 1432    hydrOXYzine (ATARAX) tablet 50 mg, 50 mg, Oral, Q6H PRN, Sarah Gann DO, 50 mg at 08/07/23 1722    insulin detemir (LEVEMIR) injection 5 Units, 5 Units, Subcutaneous, Q12H, Chely Lion, APRN, 5 Units at 08/08/23 0931    Insulin Lispro (humaLOG) injection 2-7 Units, 2-7 Units, Subcutaneous, 4x Daily AC & at Bedtime, Sarah Gann DO, 5 Units at 08/08/23 1248    Insulin Lispro (humaLOG) injection 5 Units, 5 Units, Subcutaneous, TID  With Meals, Chely Lion, APRN, 5 Units at 08/08/23 1248    lisinopril (PRINIVIL,ZESTRIL) tablet 40 mg, 40 mg, Oral, Daily, Sarah Gann R, DO, 40 mg at 08/08/23 1013    melatonin tablet 5 mg, 5 mg, Oral, Nightly, Jesus Gannley R, DO, 5 mg at 08/07/23 2009    ondansetron (ZOFRAN) injection 4 mg, 4 mg, Intravenous, Q6H PRN, Sarah Gann R, DO    povidone-iodine 10 % swab 1 each, 1 application , Topical, Daily, Yash Danielson MD, 1 each at 08/08/23 1014    QUEtiapine (SEROquel) tablet 25 mg, 25 mg, Oral, Nightly, Estela Dos Santos MD    [COMPLETED] Insert Peripheral IV, , , Once **AND** sodium chloride 0.9 % flush 10 mL, 10 mL, Intravenous, PRN, Azeem, Sarah R, DO    sodium chloride 0.9 % flush 10 mL, 10 mL, Intravenous, Q12H, Azeem, Sarah R, DO, 10 mL at 08/08/23 1014    sodium chloride 0.9 % flush 10 mL, 10 mL, Intravenous, PRN, Azeem Sarah R, DO    sodium chloride 0.9 % infusion 40 mL, 40 mL, Intravenous, PRN, Azeem, Sarah R, DO    sodium chloride nasal spray 2 spray, 2 spray, Each Nare, PRN, Yash Danielson MD    thiamine (VITAMIN B-1) tablet 100 mg, 100 mg, Oral, TID, Azeem Sarah R, DO, 100 mg at 08/08/23 0918    Please refer to the medical record for a full medication list    Review of Systems: Difficult to obtain secondary to mental status.    Constitutional-- No Fever, chills or sweats.  Appetite good, and no malaise. No fatigue.  HEENT-- No new vision, hearing or throat complaints.  No epistaxis or oral sores.  Denies odynophagia or dysphagia.  No odynophagia or dysphagia. No headache, photophobia or neck stiffness.  CV-- No chest pain, palpitation or syncope  Resp-- No SOB/cough/Hemoptysis  GI- No nausea, vomiting, or diarrhea.  No hematochezia, melena, or hematemesis. Denies jaundice or chronic liver disease.  -- No dysuria, hematuria, or flank pain.  Denies hesitancy, urgency or flank pain.  Lymph- no swollen lymph nodes in neck/axilla or groin.   Heme- No active bruising  "or bleeding; no Hx of DVT or PE.  MS-- no swelling or pain in the bones or joints of arms/legs.  No new back pain.  Neuro-- No acute focal weakness or numbness in the arms or legs.  No seizures.  Skin--No rashes or lesions    Physical Exam:   Vital Signs   Temp:  [96.6 øF (35.9 øC)-97.9 øF (36.6 øC)] 97 øF (36.1 øC)  Heart Rate:  [62-79] 72  Resp:  [16-18] 18  BP: (151-193)/() 151/64    Temp  Min: 96.6 øF (35.9 øC)  Max: 97.9 øF (36.6 øC)  BP  Min: 151/64  Max: 193/103  Pulse  Min: 62  Max: 79  Resp  Min: 16  Max: 18  SpO2  Min: 91 %  Max: 97 %    Blood pressure 151/64, pulse 72, temperature 97 øF (36.1 øC), temperature source Axillary, resp. rate 18, height 152.4 cm (60\"), weight 59.4 kg (131 lb), SpO2 95 %.  GENERAL: Awake and answers questions but slightly confused and has difficult time keeping on long conversation, in mild distress. Appears older than stated age.  Resting in bed.  HEENT:  Normocephalic, atraumatic.  Oropharynx without thrush. Dentition in good repair. No cervical adenopathy. No neck masses.  Ears externally normal, Nose externally normal.  EYES: No conjunctival injection. No icterus. EOM full.  LYMPHATICS: No lymphadenopathy of the neck or axillary or inguinal regions.   HEART: No murmur, gallop, or pericardial friction rub. Reg rate rhythm, No JVD at 45 degrees.  LUNGS: No rales. No respiratory distress, no use of accessory muscles.  ABDOMEN: Soft, nontender, nondistended. No appreciable HSM.  Bowel sounds normal.  SKIN: Warm and dry without cutaneous eruptions.  No nodules.  Left foot well-healed transmetatarsal amputation site.  PSYCHIATRIC: Mental status occasional confusion.  EXT:  No cellulitic change.  NEURO: Oriented to name, nonfocal    Results Review:   I reviewed the patient's new clinical results.  I reviewed the patient's new imaging results and agree with the interpretation.  I reviewed the patient's other test results and agree with the interpretation    Results from last 7 " days   Lab Units 08/07/23  1440 08/05/23  0635 08/03/23  0424 08/02/23  1023   WBC 10*3/mm3  --  6.17 6.41 8.46   HEMOGLOBIN g/dL 8.8* 7.9* 8.0* 8.9*   HEMATOCRIT % 27.5* 24.2* 25.1* 27.6*   PLATELETS 10*3/mm3  --  222 205 236       Results from last 7 days   Lab Units 08/03/23  0424   SODIUM mmol/L 140   POTASSIUM mmol/L 4.0   CHLORIDE mmol/L 105   CO2 mmol/L 25.0   BUN mg/dL 18   CREATININE mg/dL 1.24   GLUCOSE mg/dL 177*   CALCIUM mg/dL 8.1*       Results from last 7 days   Lab Units 08/03/23  0424   ALK PHOS U/L 67   BILIRUBIN mg/dL <0.2   ALT (SGPT) U/L 15   AST (SGOT) U/L 13           Results from last 7 days   Lab Units 08/02/23  1023   CRP mg/dL 0.59*       Results from last 7 days   Lab Units 08/02/23  1023   VANCOMYCIN RM mcg/mL 21.50       Results from last 7 days   Lab Units 08/03/23  0424   LACTATE mmol/L 0.9       Estimated Creatinine Clearance: 49.9 mL/min (by C-G formula based on SCr of 1.24 mg/dL).  CPK          8/2/2023    10:23   Common Labsle   Creatine Kinase 463       Procalitonin Results:      Lab 08/02/23  1023   PROCALCITONIN 0.09        Brief Urine Lab Results  (Last result in the past 365 days)        Color   Clarity   Blood   Leuk Est   Nitrite   Protein   CREAT   Urine HCG        08/02/23 1319 Yellow   Clear   Negative   Negative   Negative   30 mg/dL (1+)                  No results found for: SITE, ALLENTEST, PHART, IIV0XMY, PO2ART, OWI4RCN, BASEEXCESS, L6COGJLV, HGBBG, HCTABG, OXYHEMOGLOBI, METHHGBN, CARBOXYHGB, CO2CT, BAROMETRIC, MODALITY, FIO2     Microbiology:  Blood Culture   Date Value Ref Range Status   07/31/2023 No growth at 24 hours  Preliminary   07/31/2023 No growth at 24 hours  Preliminary     No results found for: BCIDPCR, CXREFLEX, CSFCX, CULTURETIS  No results found for: CULTURES, HSVCX, URCX  No results found for: EYECULTURE, GCCX, HSVCULTURE, LABHSV  No results found for: LEGIONELLA, MRSACX, MUMPSCX, MYCOPLASCX  No results found for: NOCARDIACX, STOOLCX  No results  found for: THROATCX, UNSTIMCULT, URINECX, CULTURE, VZVCULTUR  No results found for: VIRALCULTU, WOUNDCX     Blood Culture   Date Value Ref Range Status   07/31/2023 No growth at 24 hours  Preliminary   07/31/2023 No growth at 24 hours  Preliminary   (      Radiology:  Imaging Results (Last 72 Hours)       ** No results found for the last 72 hours. **            IMPRESSION:     1.  Sepsis, present on admission, sources could include a cystitis with hematuria but no significant pyuria, less likely aspiration pneumonia left lower lobe versus atelectasis, possible encephalitis not seen on CT scan, and unable to get lumbar puncture secondary to lack of cooperation, versus other.  Usual organisms would include Staphylococcus, gram-negative rods, HSV.  Clinically resolved.  2.  Encephalopathy, toxic and metabolic.  Also positive drug screen for benzodiazepines.  He also has some baseline psychiatric issues.  Clinically improved.  2a.  Doubt encephalitis.  3.  Leukocytosis, neutrophilic related to above issues.  Resolved.  4.  Anemia, chronic disease.  Minimally worse.  5.  Hematuria, related to cystitis versus other.  6.  Diabetes mellitus type 2 with increased risk for infection.  7.  Hypocalcemia 8.1, continues.    Mental status still an issue off and on.    Plan:    1.  Diagnostically, continue to follow patient's physical exam, CBC, CMP, CRP.  Urine culture ordered and not obtained.  2.  Therapeutically, continue doxycycline and cefuroxime until 8/8/2023, then follow off antibiotics.  3.  Supportive care.    Placement.    I discussed the patient's findings and my recommendations with patient and nursing staff    Thank you for asking me to see Omkar Nava.  Our group would be pleased to follow this patient over the course of their hospitalization and assist with outpatient antimicrobial therapy, as indicated.  Further recommendations depend on the results of the cultures and clinical course.  Increased risk for  adverse drug reactions, complications of IV access, readmission.  I discussed with the patient's family.  Discussed previously with Dr. Danielson.      Rayray Gordon MD  8/8/2023

## 2023-08-08 NOTE — PLAN OF CARE
Problem: Fall Injury Risk  Goal: Absence of Fall and Fall-Related Injury  Outcome: Ongoing, Progressing  Intervention: Identify and Manage Contributors  Recent Flowsheet Documentation  Taken 8/8/2023 1600 by Chuyita Figueroa RN  Medication Review/Management: medications reviewed  Taken 8/8/2023 1430 by Chuyita Figueroa RN  Medication Review/Management: medications reviewed  Taken 8/8/2023 1200 by Chuyita Figueroa RN  Medication Review/Management: medications reviewed  Taken 8/8/2023 0800 by Chuyita Figueroa RN  Medication Review/Management: medications reviewed  Intervention: Promote Injury-Free Environment  Recent Flowsheet Documentation  Taken 8/8/2023 1600 by Chuyita Figueroa RN  Safety Promotion/Fall Prevention:   activity supervised   assistive device/personal items within reach   clutter free environment maintained   elopement precautions   fall prevention program maintained   gait belt   lighting adjusted   mobility aid in reach   muscle strengthening facilitated   nonskid shoes/slippers when out of bed   room organization consistent   safety round/check completed  Taken 8/8/2023 1430 by Chuyita Figueroa RN  Safety Promotion/Fall Prevention:   activity supervised   assistive device/personal items within reach   clutter free environment maintained   elopement precautions   fall prevention program maintained   gait belt   lighting adjusted   mobility aid in reach   muscle strengthening facilitated   nonskid shoes/slippers when out of bed   room organization consistent   safety round/check completed  Taken 8/8/2023 1200 by Chuyita Figueroa RN  Safety Promotion/Fall Prevention:   assistive device/personal items within reach   clutter free environment maintained   elopement precautions   fall prevention program maintained   activity supervised   gait belt   lighting adjusted   mobility aid in reach   nonskid shoes/slippers when out of bed   muscle strengthening facilitated   room organization consistent   safety  round/check completed  Taken 8/8/2023 1030 by Chuyita Figueroa RN  Safety Promotion/Fall Prevention:   activity supervised   assistive device/personal items within reach   clutter free environment maintained   elopement precautions   fall prevention program maintained   gait belt   lighting adjusted   mobility aid in reach   muscle strengthening facilitated   nonskid shoes/slippers when out of bed   room organization consistent   safety round/check completed  Taken 8/8/2023 0800 by Chuyita Figueroa RN  Safety Promotion/Fall Prevention:   activity supervised   assistive device/personal items within reach   clutter free environment maintained   elopement precautions   fall prevention program maintained   gait belt   lighting adjusted   mobility aid in reach   muscle strengthening facilitated   nonskid shoes/slippers when out of bed   room organization consistent   safety round/check completed     Problem: Fall Injury Risk  Goal: Absence of Fall and Fall-Related Injury  Intervention: Identify and Manage Contributors  Recent Flowsheet Documentation  Taken 8/8/2023 1600 by Chuyita Figueroa RN  Medication Review/Management: medications reviewed  Taken 8/8/2023 1430 by Chuyita Figueroa RN  Medication Review/Management: medications reviewed  Taken 8/8/2023 1200 by Chuyita Figueroa RN  Medication Review/Management: medications reviewed  Taken 8/8/2023 0800 by Chuyita Figueroa RN  Medication Review/Management: medications reviewed     Problem: Fall Injury Risk  Goal: Absence of Fall and Fall-Related Injury  Intervention: Promote Injury-Free Environment  Recent Flowsheet Documentation  Taken 8/8/2023 1600 by Chuyita Figueroa RN  Safety Promotion/Fall Prevention:   activity supervised   assistive device/personal items within reach   clutter free environment maintained   elopement precautions   fall prevention program maintained   gait belt   lighting adjusted   mobility aid in reach   muscle strengthening facilitated   nonskid  shoes/slippers when out of bed   room organization consistent   safety round/check completed  Taken 8/8/2023 1430 by Chuyita Figueroa, RN  Safety Promotion/Fall Prevention:   activity supervised   assistive device/personal items within reach   clutter free environment maintained   elopement precautions   fall prevention program maintained   gait belt   lighting adjusted   mobility aid in reach   muscle strengthening facilitated   nonskid shoes/slippers when out of bed   room organization consistent   safety round/check completed  Taken 8/8/2023 1200 by Chuyita Figueroa, RN  Safety Promotion/Fall Prevention:   assistive device/personal items within reach   clutter free environment maintained   elopement precautions   fall prevention program maintained   activity supervised   gait belt   lighting adjusted   mobility aid in reach   nonskid shoes/slippers when out of bed   muscle strengthening facilitated   room organization consistent   safety round/check completed  Taken 8/8/2023 1030 by Chuyita Figueroa, RN  Safety Promotion/Fall Prevention:   activity supervised   assistive device/personal items within reach   clutter free environment maintained   elopement precautions   fall prevention program maintained   gait belt   lighting adjusted   mobility aid in reach   muscle strengthening facilitated   nonskid shoes/slippers when out of bed   room organization consistent   safety round/check completed  Taken 8/8/2023 0800 by Chuyita Figueroa, RN  Safety Promotion/Fall Prevention:   activity supervised   assistive device/personal items within reach   clutter free environment maintained   elopement precautions   fall prevention program maintained   gait belt   lighting adjusted   mobility aid in reach   muscle strengthening facilitated   nonskid shoes/slippers when out of bed   room organization consistent   safety round/check completed   Goal Outcome Evaluation:

## 2023-08-08 NOTE — PLAN OF CARE
Goal Outcome Evaluation:      Pt's VSS, RA, SA. Restraints D/C'd. HTN treated with PRN hydralazine. Continue POC.      Problem: Fall Injury Risk  Goal: Absence of Fall and Fall-Related Injury  8/8/2023 0636 by Corrine Hernandez RN  Outcome: Ongoing, Progressing  8/8/2023 0636 by Corrine Hernandez RN  Outcome: Ongoing, Progressing  Intervention: Identify and Manage Contributors  Recent Flowsheet Documentation  Taken 8/7/2023 2000 by Corrine Hernandez RN  Medication Review/Management: medications reviewed  Intervention: Promote Injury-Free Environment  Recent Flowsheet Documentation  Taken 8/8/2023 0600 by Corrine Hernandez RN  Safety Promotion/Fall Prevention: safety round/check completed  Taken 8/8/2023 0400 by Corrine Hernandez RN  Safety Promotion/Fall Prevention: safety round/check completed  Taken 8/8/2023 0200 by Corrine Hernandez RN  Safety Promotion/Fall Prevention: safety round/check completed  Taken 8/8/2023 0000 by Mary, Corrine, RN  Safety Promotion/Fall Prevention: safety round/check completed  Taken 8/7/2023 2200 by Corrine Hernandez RN  Safety Promotion/Fall Prevention: safety round/check completed  Taken 8/7/2023 2000 by Mary, Corrine, RN  Safety Promotion/Fall Prevention:   activity supervised   clutter free environment maintained   fall prevention program maintained   nonskid shoes/slippers when out of bed   room organization consistent   safety round/check completed     Problem: Skin Injury Risk Increased  Goal: Skin Health and Integrity  8/8/2023 0636 by Corrine Hernandez RN  Outcome: Ongoing, Progressing  8/8/2023 0636 by Corrine Hernandez RN  Outcome: Ongoing, Progressing  Intervention: Optimize Skin Protection  Recent Flowsheet Documentation  Taken 8/8/2023 0600 by Corrine Hernandez RN  Pressure Reduction Techniques:   frequent weight shift encouraged   heels elevated off bed   positioned off wounds   pressure points protected   weight shift assistance  provided  Head of Bed (HOB) Positioning: [facility] at 20 degrees  Pressure Reduction Devices:   positioning supports utilized   pressure-redistributing mattress utilized   heel offloading device utilized  Skin Protection:   adhesive use limited   incontinence pads utilized   tubing/devices free from skin contact  Taken 8/8/2023 0400 by Corrine Hernandez RN  Pressure Reduction Techniques:   frequent weight shift encouraged   positioned off wounds   pressure points protected  Head of Bed (HOB) Positioning: HOB at 20-30 degrees  Pressure Reduction Devices:   positioning supports utilized   pressure-redistributing mattress utilized  Skin Protection:   adhesive use limited   incontinence pads utilized   tubing/devices free from skin contact  Taken 8/8/2023 0200 by Corrine Hernandez RN  Pressure Reduction Techniques:   frequent weight shift encouraged   heels elevated off bed   positioned off wounds   pressure points protected  Head of Bed ([facility]) Positioning: HOB at 20-30 degrees  Pressure Reduction Devices:   positioning supports utilized   pressure-redistributing mattress utilized   heel offloading device utilized  Skin Protection:   adhesive use limited   incontinence pads utilized   tubing/devices free from skin contact  Taken 8/8/2023 0000 by Corrine Hernandez RN  Pressure Reduction Techniques:   frequent weight shift encouraged   heels elevated off bed   positioned off wounds   pressure points protected   weight shift assistance provided  Head of Bed ([facility]) Positioning: [facility] at 20-30 degrees  Pressure Reduction Devices:   heel offloading device utilized   positioning supports utilized   pressure-redistributing mattress utilized  Skin Protection:   adhesive use limited   incontinence pads utilized   tubing/devices free from skin contact  Taken 8/7/2023 2200 by Corrine Hernandez RN  Pressure Reduction Techniques:   heels elevated off bed   frequent weight shift encouraged   positioned off wounds   pressure points  protected   weight shift assistance provided  Head of Bed (HOB) Positioning: HOB at 20-30 degrees  Pressure Reduction Devices:   heel offloading device utilized   positioning supports utilized   pressure-redistributing mattress utilized  Skin Protection:   adhesive use limited   incontinence pads utilized   tubing/devices free from skin contact  Taken 8/7/2023 2000 by Corrine Hernandez RN  Pressure Reduction Techniques:   frequent weight shift encouraged   positioned off wounds   heels elevated off bed   pressure points protected   weight shift assistance provided  Head of Bed (HOB) Positioning: HOB at 20-30 degrees  Pressure Reduction Devices:   pressure-redistributing mattress utilized   positioning supports utilized   heel offloading device utilized  Skin Protection:   adhesive use limited   incontinence pads utilized   tubing/devices free from skin contact     Problem: Adult Inpatient Plan of Care  Goal: Plan of Care Review  8/8/2023 0636 by Corrine Hernandez RN  Outcome: Ongoing, Progressing  8/8/2023 0636 by Corrine Hernandez RN  Outcome: Ongoing, Progressing  Goal: Patient-Specific Goal (Individualized)  8/8/2023 0636 by Corrine Hernandez RN  Outcome: Ongoing, Progressing  8/8/2023 0636 by Corrine Hernandez RN  Outcome: Ongoing, Progressing  Goal: Absence of Hospital-Acquired Illness or Injury  8/8/2023 0636 by Corrine Hernandez RN  Outcome: Ongoing, Progressing  8/8/2023 0636 by Corrine Hernandez RN  Outcome: Ongoing, Progressing  Intervention: Identify and Manage Fall Risk  Recent Flowsheet Documentation  Taken 8/8/2023 0600 by Corrine Hernandez RN  Safety Promotion/Fall Prevention: safety round/check completed  Taken 8/8/2023 0400 by Corrine Hernandez RN  Safety Promotion/Fall Prevention: safety round/check completed  Taken 8/8/2023 0200 by Corrine Hernandez RN  Safety Promotion/Fall Prevention: safety round/check completed  Taken 8/8/2023 0000 by Corrine Hernandez  RN  Safety Promotion/Fall Prevention: safety round/check completed  Taken 8/7/2023 2200 by Corrine Hernandez RN  Safety Promotion/Fall Prevention: safety round/check completed  Taken 8/7/2023 2000 by Mary, Corrine, RN  Safety Promotion/Fall Prevention:   activity supervised   clutter free environment maintained   fall prevention program maintained   nonskid shoes/slippers when out of bed   room organization consistent   safety round/check completed  Intervention: Prevent Skin Injury  Recent Flowsheet Documentation  Taken 8/8/2023 0600 by Corrine Hernandez RN  Body Position: position changed independently  Skin Protection:   adhesive use limited   incontinence pads utilized   tubing/devices free from skin contact  Taken 8/8/2023 0400 by Corrine Hernandez RN  Body Position: position changed independently  Skin Protection:   adhesive use limited   incontinence pads utilized   tubing/devices free from skin contact  Taken 8/8/2023 0200 by Corrine Hernandez RN  Body Position: supine  Skin Protection:   adhesive use limited   incontinence pads utilized   tubing/devices free from skin contact  Taken 8/8/2023 0000 by Corrine Hernandez RN  Body Position: position changed independently  Skin Protection:   adhesive use limited   incontinence pads utilized   tubing/devices free from skin contact  Taken 8/7/2023 2200 by Corrine Hernandez RN  Body Position: tilted  Skin Protection:   adhesive use limited   incontinence pads utilized   tubing/devices free from skin contact  Taken 8/7/2023 2000 by Corrine Hernandez RN  Body Position: position changed independently  Skin Protection:   adhesive use limited   incontinence pads utilized   tubing/devices free from skin contact  Intervention: Prevent and Manage VTE (Venous Thromboembolism) Risk  Recent Flowsheet Documentation  Taken 8/7/2023 2000 by Corrine Hernandez RN  VTE Prevention/Management: (eliquis) other (see comments)  Intervention: Prevent  Infection  Recent Flowsheet Documentation  Taken 8/8/2023 0600 by Corrine Hernandez RN  Infection Prevention:   cohorting utilized   rest/sleep promoted   single patient room provided  Taken 8/8/2023 0400 by Corrine Hernandez RN  Infection Prevention:   cohorting utilized   rest/sleep promoted   single patient room provided  Taken 8/8/2023 0200 by Corrine Hernandez RN  Infection Prevention:   cohorting utilized   rest/sleep promoted   single patient room provided  Taken 8/8/2023 0000 by Corrine Hernandez RN  Infection Prevention:   cohorting utilized   rest/sleep promoted   single patient room provided  Taken 8/7/2023 2200 by Corrine Hernandez RN  Infection Prevention:   cohorting utilized   rest/sleep promoted   single patient room provided  Taken 8/7/2023 2000 by Corrine Hernandez RN  Infection Prevention:   cohorting utilized   rest/sleep promoted   single patient room provided  Goal: Optimal Comfort and Wellbeing  8/8/2023 0636 by Corrine Hernandez RN  Outcome: Ongoing, Progressing  8/8/2023 0636 by Corrine Hernandez RN  Outcome: Ongoing, Progressing  Intervention: Provide Person-Centered Care  Recent Flowsheet Documentation  Taken 8/7/2023 1900 by Corrine Hernandez RN  Trust Relationship/Rapport:   care explained   choices provided   emotional support provided   empathic listening provided   questions answered   questions encouraged   reassurance provided   thoughts/feelings acknowledged  Goal: Readiness for Transition of Care  Outcome: Ongoing, Progressing     Problem: Adjustment to Illness (Sepsis/Septic Shock)  Goal: Optimal Coping  Outcome: Ongoing, Progressing     Problem: Bleeding (Sepsis/Septic Shock)  Goal: Absence of Bleeding  Outcome: Ongoing, Progressing     Problem: Glycemic Control Impaired (Sepsis/Septic Shock)  Goal: Blood Glucose Level Within Desired Range  Outcome: Ongoing, Progressing     Problem: Infection Progression (Sepsis/Septic Shock)  Goal: Absence of  Infection Signs and Symptoms  Outcome: Ongoing, Progressing  Intervention: Initiate Sepsis Management  Recent Flowsheet Documentation  Taken 8/8/2023 0600 by Corrine Hernandez RN  Infection Prevention:   cohorting utilized   rest/sleep promoted   single patient room provided  Taken 8/8/2023 0400 by Corrine Hernandez RN  Infection Prevention:   cohorting utilized   rest/sleep promoted   single patient room provided  Taken 8/8/2023 0200 by Corrine Hernandez RN  Infection Prevention:   cohorting utilized   rest/sleep promoted   single patient room provided  Taken 8/8/2023 0000 by Corrine Hernandez RN  Infection Prevention:   cohorting utilized   rest/sleep promoted   single patient room provided  Taken 8/7/2023 2200 by Corrine Hernandez RN  Infection Prevention:   cohorting utilized   rest/sleep promoted   single patient room provided  Taken 8/7/2023 2000 by Corrine Hernandez RN  Infection Prevention:   cohorting utilized   rest/sleep promoted   single patient room provided     Problem: Nutrition Impaired (Sepsis/Septic Shock)  Goal: Optimal Nutrition Intake  Outcome: Ongoing, Progressing     Problem: Confusion Acute  Goal: Optimal Cognitive Function  Outcome: Ongoing, Progressing     Problem: Restraint, Nonviolent  Goal: Absence of Harm or Injury  Outcome: Ongoing, Progressing  Intervention: Implement Least Restrictive Safety Strategies  Recent Flowsheet Documentation  Taken 8/8/2023 0600 by Corrine Hernandez RN  Medical Device Protection: tubing secured  Less Restrictive Alternative:   1:1 observation maintained   bed alarm in use   calming techniques promoted   coping techniques promoted   sensory stimulation limited  De-Escalation Techniques:   appropriate behavior reinforced   diversional activity encouraged   increased round frequency  Taken 8/8/2023 0400 by Corrine Hernandez RN  Medical Device Protection: tubing secured  Less Restrictive Alternative:   1:1 observation maintained    appropriate expression promoted   bed alarm in use   sensory stimulation limited  De-Escalation Techniques:   increased round frequency   stimulation decreased  Taken 8/8/2023 0200 by Corrine Hernandez RN  Medical Device Protection: tubing secured  Less Restrictive Alternative:   bed alarm in use   calming techniques promoted  De-Escalation Techniques:   increased round frequency   quiet time facilitated   stimulation decreased  Taken 8/8/2023 0000 by Corrine Hernandez RN  Medical Device Protection: tubing secured  Less Restrictive Alternative:   bed alarm in use   calming techniques promoted  De-Escalation Techniques:   increased round frequency   quiet time facilitated   stimulation decreased  Taken 8/7/2023 2200 by Corrine Hernandez RN  Medical Device Protection: tubing secured  Less Restrictive Alternative:   1:1 observation maintained   appropriate expression promoted   bed alarm in use   calming techniques promoted   coping techniques promoted   emotional support provided   sensory stimulation limited   therapeutic music   safety enhancements provided   positive reinforcement provided  De-Escalation Techniques:   appropriate behavior reinforced   1:1 observation initiated   diversional activity encouraged   increased round frequency   quiet time facilitated   reoriented   stimulation decreased  Diversional Activities: television  Taken 8/7/2023 2000 by Corrine Hernandez RN  Medical Device Protection:   tubing secured   torso covered  Less Restrictive Alternative:   1:1 observation maintained   appropriate expression promoted   bed alarm in use   calming techniques promoted   coping techniques promoted   emotional support provided   environment adjusted   family presence promoted   physical activity promoted   positive reinforcement provided   problem-solving encouraged   safety enhancements provided   surveillance provided   therapeutic music   sensory stimulation provided   sensory stimulation  limited   security enhancements provided  De-Escalation Techniques: (Pt refusing medications)   1:1 observation initiated   appropriate behavior reinforced   diversional activity encouraged   family involvement requested   increased round frequency   quiet time facilitated   reoriented   stimulation decreased   verbally redirected  Diversional Activities: television  Taken 8/7/2023 1948 by Corrine Hernandez RN  Medical Device Protection:   tubing secured   torso covered  Less Restrictive Alternative:   1:1 observation maintained   appropriate expression promoted   bed alarm in use   calming techniques promoted   coping techniques promoted   emotional support provided   environment adjusted   family presence promoted   problem-solving encouraged   positive reinforcement provided   safety enhancements provided   security enhancements provided   sensory stimulation limited   sensory stimulation provided   surveillance provided  De-Escalation Techniques:   1:1 observation initiated   appropriate behavior reinforced   diversional activity encouraged   family involvement requested   increased round frequency   quiet time facilitated   reoriented   stimulation decreased   verbally redirected  Diversional Activities: television  Taken 8/7/2023 1900 by Corrine Hernandez RN  Diversional Activities: television  Intervention: Protect Dignity, Rights, and Personal Wellbeing  Recent Flowsheet Documentation  Taken 8/7/2023 1900 by Corrine Hernandez RN  Trust Relationship/Rapport:   care explained   choices provided   emotional support provided   empathic listening provided   questions answered   questions encouraged   reassurance provided   thoughts/feelings acknowledged  Intervention: Protect Skin and Joint Integrity  Recent Flowsheet Documentation  Taken 8/8/2023 0600 by Corrine Hernandez RN  Body Position: position changed independently  Taken 8/8/2023 0400 by Corrine Hernandez RN  Body Position: position changed  independently  Taken 8/8/2023 0200 by Corrine Hernandez RN  Body Position: supine  Taken 8/8/2023 0000 by Corrine Hernandez RN  Body Position: position changed independently  Taken 8/7/2023 2200 by Corrine Hernandez RN  Body Position: tilted  Taken 8/7/2023 2000 by Corrine Hernandez RN  Body Position: position changed independently

## 2023-08-08 NOTE — SIGNIFICANT NOTE
RN arrived on shift with Pt angry, agitated, and trying to get out of bed. When asked orientation questions, Pt knew name and date of birth, but saying he was on [address] and needed to get out of this house. Pt had pulled male external catheter off and incontinent pad was wet. Pt's incontinent pad changed, and new external catheter placed. Pt then fed watermelon, after a few bites Pt refused to eat anymore. Pt becoming more agitated, using foul language and saying he needed to get out of this house. Pt reoriented but Pt in disbelief. PA paged. Daughter called and informed on patients condition. Daughter spoke to Pt over the phone. Once Pt got off the phone, he became agitated again. Pt trying to get out of the bed, disoriented x3. Pt reoriented, and Pt refusing to get back into the bed, threatening and swinging his arms to physically hurt staff. STAT paged PA, called and restraints ordered. Pt still swinging to physically hurt staff and threatening to hurt us. Florin Luz called.

## 2023-08-08 NOTE — CASE MANAGEMENT/SOCIAL WORK
Continued Stay Note  [facility]     Patient Name: Omkar Nava  MRN: [id number]  Today's Date: 8/8/2023    Admit Date: 7/31/2023    Plan: LTC   Discharge Plan       Row Name 08/08/23 1533       Plan    Plan LTC    Patient/Family in Agreement with Plan yes    Plan Comments Spoke with the daughter today. Explained to the daughter if patient is medically ready for discharge patient will have to return to [facility]. Explain that he could be medically ready tomorrow. CM did fax out referrals to other LTC places, with no luck in founding a LTC bed. CM did attentively setup transport  with [facility] at 1800. CM called [facility] serveral time and left message for admission to call me back. CM will follow.    Final Discharge Disposition Code 04 - intermediate care facility                   Discharge Codes    No documentation.                 Expected Discharge Date and Time       Expected Discharge Date Expected Discharge Time    Aug 8, 2023               Edie Ochoa RN

## 2023-08-08 NOTE — SIGNIFICANT NOTE
I have reviewed the need for additional restraints, patient condition, and least restrictive alternatives have been explored.    Jenniffer Lerma RN  Clinical House Supervisor

## 2023-08-09 VITALS
HEIGHT: 60 IN | TEMPERATURE: 96.4 F | WEIGHT: 196 LBS | DIASTOLIC BLOOD PRESSURE: 92 MMHG | BODY MASS INDEX: 38.48 KG/M2 | OXYGEN SATURATION: 96 % | SYSTOLIC BLOOD PRESSURE: 176 MMHG | RESPIRATION RATE: 18 BRPM | HEART RATE: 70 BPM

## 2023-08-09 LAB
GLUCOSE BLDC GLUCOMTR-MCNC: 118 MG/DL (ref 70–130)
GLUCOSE BLDC GLUCOMTR-MCNC: 138 MG/DL (ref 70–130)
QT INTERVAL: 412 MS
QTC INTERVAL: 472 MS

## 2023-08-09 PROCEDURE — 99239 HOSP IP/OBS DSCHRG MGMT >30: CPT | Performed by: NURSE PRACTITIONER

## 2023-08-09 PROCEDURE — 63710000001 INSULIN DETEMIR PER 5 UNITS: Performed by: NURSE PRACTITIONER

## 2023-08-09 PROCEDURE — 82948 REAGENT STRIP/BLOOD GLUCOSE: CPT

## 2023-08-09 PROCEDURE — 63710000001 INSULIN LISPRO (HUMAN) PER 5 UNITS: Performed by: NURSE PRACTITIONER

## 2023-08-09 RX ORDER — INSULIN LISPRO 100 [IU]/ML
5 INJECTION, SOLUTION INTRAVENOUS; SUBCUTANEOUS
Refills: 12
Start: 2023-08-09

## 2023-08-09 RX ORDER — HYDRALAZINE HYDROCHLORIDE 25 MG/1
75 TABLET, FILM COATED ORAL EVERY 8 HOURS SCHEDULED
Start: 2023-08-09

## 2023-08-09 RX ORDER — ASCORBIC ACID 500 MG
500 TABLET ORAL DAILY
Start: 2023-08-10

## 2023-08-09 RX ORDER — INSULIN GLARGINE 100 [IU]/ML
5 INJECTION, SOLUTION SUBCUTANEOUS 2 TIMES DAILY
Refills: 12
Start: 2023-08-09

## 2023-08-09 RX ORDER — LISINOPRIL 40 MG/1
40 TABLET ORAL DAILY
Start: 2023-08-10

## 2023-08-09 RX ORDER — AMLODIPINE BESYLATE 5 MG/1
5 TABLET ORAL
Start: 2023-08-10

## 2023-08-09 RX ADMIN — HYDRALAZINE HYDROCHLORIDE 75 MG: 50 TABLET, FILM COATED ORAL at 06:35

## 2023-08-09 RX ADMIN — INSULIN LISPRO 5 UNITS: 100 INJECTION, SOLUTION INTRAVENOUS; SUBCUTANEOUS at 08:40

## 2023-08-09 RX ADMIN — LISINOPRIL 40 MG: 40 TABLET ORAL at 08:44

## 2023-08-09 RX ADMIN — BRIMONIDINE TARTRATE 1 DROP: 2 SOLUTION OPHTHALMIC at 08:43

## 2023-08-09 RX ADMIN — AMLODIPINE BESYLATE 5 MG: 5 TABLET ORAL at 08:44

## 2023-08-09 RX ADMIN — APIXABAN 2.5 MG: 2.5 TABLET, FILM COATED ORAL at 08:44

## 2023-08-09 RX ADMIN — INSULIN DETEMIR 5 UNITS: 100 INJECTION, SOLUTION SUBCUTANEOUS at 08:41

## 2023-08-09 RX ADMIN — HYDRALAZINE HYDROCHLORIDE 75 MG: 50 TABLET, FILM COATED ORAL at 13:58

## 2023-08-09 RX ADMIN — FERROUS SULFATE TAB 325 MG (65 MG ELEMENTAL FE) 325 MG: 325 (65 FE) TAB at 08:43

## 2023-08-09 RX ADMIN — THIAMINE HCL TAB 100 MG 100 MG: 100 TAB at 09:02

## 2023-08-09 RX ADMIN — FAMOTIDINE 20 MG: 20 TABLET, FILM COATED ORAL at 08:44

## 2023-08-09 RX ADMIN — OXYCODONE HYDROCHLORIDE AND ACETAMINOPHEN 500 MG: 500 TABLET ORAL at 08:44

## 2023-08-09 RX ADMIN — DIVALPROEX SODIUM 250 MG: 250 TABLET, DELAYED RELEASE ORAL at 08:46

## 2023-08-09 RX ADMIN — TIMOLOL MALEATE 1 DROP: 5 SOLUTION/ DROPS OPHTHALMIC at 08:43

## 2023-08-09 RX ADMIN — POVIDONE-IODINE 1 EACH: 10 SOLUTION TOPICAL at 09:02

## 2023-08-09 RX ADMIN — INSULIN LISPRO 5 UNITS: 100 INJECTION, SOLUTION INTRAVENOUS; SUBCUTANEOUS at 11:40

## 2023-08-09 RX ADMIN — Medication 10 ML: at 09:03

## 2023-08-09 NOTE — PROGRESS NOTES
[facility]    INFECTIOUS DISEASE PROGRESS NOTE    Omkar Nava  1955  [id number]    Consult: 8/1/23    Admit date: 7/31/2023    Requesting Provider: No ref. provider found  Evaluating physician: Rayray Gordon MD  Reason for Consultation: Fever, sepsis, confusion, possible cystitis  Chief Complaint: Above    Subjective   History of present illness:  Patient is a  67 y.o.  Yr old male With a history of diabetes mellitus type 2, peripheral neuropathy, history of alcohol and cocaine and marijuana use, essential hypertension, GERD, psychiatric issues in the past, left foot osteomyelitis status post transmetatarsal amputation December 2022, status post IV antibiotics and oral which completed around January 2023.  Patient was at the nursing home and became increasingly confused and was brought in by daughter and admitted to [facility] on 7/31/2023.  Patient is a poor historian.  He was documented to have a fever of greater than 102.  Urinalysis had hematuria without significant pyuria.  Chest x-ray said possible left lower lobe atelectasis.  CT scan of the brain without acute changes.  CT scan of the abdomen and pelvis with some bladder wall thickening.  I was consulted on 8/1/2023 for further evaluation and treatment.    8/2/2023 history reviewed.  Mental status is down to his baseline which is oftentimes confused.  Tolerating vancomycin, ceftriaxone, and acyclovir but stopping acyclovir today.    8/3/2023 history reviewed.  No high fevers or chills.  Tolerating vancomycin and ceftriaxone.  Cultures have been negative.  No urine culture sent.    8/4/2023 history reviewed.  No fever.  Feeling better.  Ate some [employer] takeout food.  Cultures are negative.  Tolerating cefuroxime and doxycycline until 8/7.    8/7/2023 history reviewed.  Cefuroxime and doxycycline ending soon.  No high fever.  No pain.  8/8/2023 history reviewed.  Stopping cefuroxime and  doxycycline.  No high fever.  No pain.  Gets confused and more belligerent at times.    8/9/2023 history reviewed.  No high fever off antibiotics.  Confused but pleasant with me.  No pain.    Past Medical History:   Diagnosis Date    Anemia     Dementia     Diabetes mellitus     Dysphagia     GERD (gastroesophageal reflux disease)     History of alcohol abuse     History of cocaine use     History of marijuana use     Hypertension     Osteomyelitis     Poor historian     records obtained from nursing home records & his family    Visual impairment        Past Surgical History:   Procedure Laterality Date    AMPUTATION FOOT Left 10/18/2022    Procedure: PARTIAL FIRST RAY AMPUTATION LEFT;  Surgeon: Yeison Petty MD;  Location:  [facility];  Service: Orthopedics;  Laterality: Left;    AMPUTATION FOOT Left 12/5/2022    Procedure: Transmetatarsal of Left Foot;  Surgeon: Yeison Petty MD;  Location:  [facility];  Service: Orthopedics;  Laterality: Left;    EYE SURGERY         Pediatric History   Patient Parents    Not on file     Other Topics Concern    Not on file   Social History Narrative    Caffeine 0-1 servings per day    Patient lives at home .   Nursing home resident, no current alcohol or drug use or cigarette use    family history includes Diabetes in his brother, brother, father, maternal grandfather, maternal grandmother, mother, and sister; Hypertension in his brother, brother, father, and mother.    No Known Allergies    Immunization History   Administered Date(s) Administered    COVID-19 (MODERNA) 1st,2nd,3rd Dose Monovalent 06/09/2021, 07/19/2021    COVID-19 (MODERNA) Monovalent Original Booster 04/11/2022       Medication:    Current Facility-Administered Medications:     acetaminophen (TYLENOL) tablet 650 mg, 650 mg, Oral, Q4H PRN, Sarah Gann, DO, 650 mg at 08/06/23 2152    amLODIPine (NORVASC) tablet 5 mg, 5 mg, Oral, Q24H, Jeniffer Winkler, APRN, 5 mg at 08/09/23 0844    apixaban (ELIQUIS) tablet  2.5 mg, 2.5 mg, Oral, Q12H, Yash Danielson MD, 2.5 mg at 08/09/23 0844    ascorbic acid (VITAMIN C) tablet 500 mg, 500 mg, Oral, Daily, Chely Lion, APRN, 500 mg at 08/09/23 0844    sennosides-docusate (PERICOLACE) 8.6-50 MG per tablet 2 tablet, 2 tablet, Oral, BID, 2 tablet at 08/06/23 0918 **AND** polyethylene glycol (MIRALAX) packet 17 g, 17 g, Oral, Daily PRN **AND** bisacodyl (DULCOLAX) EC tablet 5 mg, 5 mg, Oral, Daily PRN **AND** bisacodyl (DULCOLAX) suppository 10 mg, 10 mg, Rectal, Daily PRN, Sarah Gann R, DO    brimonidine (ALPHAGAN) 0.2 % ophthalmic solution 1 drop, 1 drop, Both Eyes, TID, 1 drop at 08/09/23 0843 **AND** timolol (TIMOPTIC) 0.5 % ophthalmic solution 1 drop, 1 drop, Both Eyes, BID, Sarah Gann DO, 1 drop at 08/09/23 0843    dextrose (D50W) (25 g/50 mL) IV injection 25 g, 25 g, Intravenous, Q15 Min PRN, Sarah Gann R, DO    dextrose (GLUTOSE) oral gel 15 g, 15 g, Oral, Q15 Min PRN, Sarah Gann, DO    divalproex (DEPAKOTE) DR tablet 250 mg, 250 mg, Oral, BID, Sarah Gann, DO, 250 mg at 08/09/23 0846    famotidine (PEPCID) tablet 20 mg, 20 mg, Oral, BID, Sarah Gann DO, 20 mg at 08/09/23 0844    ferrous sulfate tablet 325 mg, 325 mg, Oral, Daily With Breakfast, Sarah Gann, DO, 325 mg at 08/09/23 0843    glucagon (GLUCAGEN) injection 1 mg, 1 mg, Intramuscular, Q15 Min PRN, Sarah Gann R, DO    hydrALAZINE (APRESOLINE) injection 10 mg, 10 mg, Intravenous, Q6H PRN, Sarah Gann, DO, 10 mg at 08/08/23 0545    hydrALAZINE (APRESOLINE) tablet 75 mg, 75 mg, Oral, Q8H, Chely Lion, APRN, 75 mg at 08/09/23 0635    hydrOXYzine (ATARAX) tablet 50 mg, 50 mg, Oral, Q6H PRN, Sarah Gann, , 50 mg at 08/07/23 1722    insulin detemir (LEVEMIR) injection 5 Units, 5 Units, Subcutaneous, Q12H, Chely Lion, APRN, 5 Units at 08/09/23 0841    Insulin Lispro (humaLOG) injection 2-7 Units, 2-7 Units, Subcutaneous, 4x Daily AC & at  Bedtime, Azeem, Sarah R, DO, 5 Units at 08/08/23 1248    Insulin Lispro (humaLOG) injection 5 Units, 5 Units, Subcutaneous, TID With Meals, Chely Lion, APRN, 5 Units at 08/09/23 0840    lisinopril (PRINIVIL,ZESTRIL) tablet 40 mg, 40 mg, Oral, Daily, Azeem, Sarah R, DO, 40 mg at 08/09/23 0844    melatonin tablet 5 mg, 5 mg, Oral, Nightly, Azeem, Sarah R, DO, 5 mg at 08/08/23 2036    ondansetron (ZOFRAN) injection 4 mg, 4 mg, Intravenous, Q6H PRN, Azeem, Sarah R, DO    povidone-iodine 10 % swab 1 each, 1 application , Topical, Daily, Yash Danielson MD, 1 each at 08/08/23 1014    [COMPLETED] Insert Peripheral IV, , , Once **AND** sodium chloride 0.9 % flush 10 mL, 10 mL, Intravenous, PRN, Azeem, Sarah R, DO    sodium chloride 0.9 % flush 10 mL, 10 mL, Intravenous, Q12H, Azeem, Sarah R, DO, 10 mL at 08/08/23 2050    sodium chloride 0.9 % flush 10 mL, 10 mL, Intravenous, PRN, Azeem, Sarah R, DO    sodium chloride 0.9 % infusion 40 mL, 40 mL, Intravenous, PRN, Azeem, Sarah R, DO    sodium chloride nasal spray 2 spray, 2 spray, Each Nare, PRN, Yash Danielson MD    thiamine (VITAMIN B-1) tablet 100 mg, 100 mg, Oral, TID, Azeem, Sarah R, DO, 100 mg at 08/08/23 2036    Please refer to the medical record for a full medication list    Review of Systems: Difficult to obtain secondary to mental status.    Constitutional-- No Fever, chills or sweats.  Appetite good, and no malaise. No fatigue.  HEENT-- No new vision, hearing or throat complaints.  No epistaxis or oral sores.  Denies odynophagia or dysphagia.  No odynophagia or dysphagia. No headache, photophobia or neck stiffness.  CV-- No chest pain, palpitation or syncope  Resp-- No SOB/cough/Hemoptysis  GI- No nausea, vomiting, or diarrhea.  No hematochezia, melena, or hematemesis. Denies jaundice or chronic liver disease.  -- No dysuria, hematuria, or flank pain.  Denies hesitancy, urgency or flank pain.  Lymph- no swollen lymph nodes  "in neck/axilla or groin.   Heme- No active bruising or bleeding; no Hx of DVT or PE.  MS-- no swelling or pain in the bones or joints of arms/legs.  No new back pain.  Neuro-- No acute focal weakness or numbness in the arms or legs.  No seizures.  Skin--No rashes or lesions, no nodules    Physical Exam:   Vital Signs   Temp:  [97 øF (36.1 øC)-98.7 øF (37.1 øC)] 97.7 øF (36.5 øC)  Heart Rate:  [69-75] 72  Resp:  [16-18] 16  BP: (119-178)/(62-94) 178/94    Temp  Min: 97 øF (36.1 øC)  Max: 98.7 øF (37.1 øC)  BP  Min: 119/62  Max: 178/94  Pulse  Min: 69  Max: 75  Resp  Min: 16  Max: 18  SpO2  Min: 95 %  Max: 95 %    Blood pressure 178/94, pulse 72, temperature 97.7 øF (36.5 øC), temperature source Axillary, resp. rate 16, height 152.4 cm (60\"), weight 59.4 kg (131 lb), SpO2 95 %.  GENERAL: Awake and answers questions but slightly confused and has difficult time keeping on long conversation, in minimal distress. Appears older than stated age.  Resting in bed.  HEENT:  Normocephalic, atraumatic.  Oropharynx without thrush. Dentition in good repair. No cervical adenopathy. No neck masses.  Ears externally normal, Nose externally normal.  EYES: No conjunctival injection. No icterus. EOM full.  LYMPHATICS: No lymphadenopathy of the neck or axillary or inguinal regions.   HEART: No murmur, gallop, or pericardial friction rub. Reg rate rhythm, No JVD at 45 degrees.  No JVD.  LUNGS: No rales. No respiratory distress, no use of accessory muscles.  ABDOMEN: Soft, nontender, nondistended. No appreciable HSM.  Bowel sounds normal.  SKIN: Warm and dry without cutaneous eruptions.  No nodules.  Left foot well-healed transmetatarsal amputation site.  PSYCHIATRIC: Mental status occasional confusion.  EXT:  No cellulitic change.  NEURO: Oriented to name, nonfocal    Results Review:   I reviewed the patient's new clinical results.  I reviewed the patient's new imaging results and agree with the interpretation.  I reviewed the patient's " other test results and agree with the interpretation    Results from last 7 days   Lab Units 08/07/23  1440 08/05/23  0635 08/03/23  0424 08/02/23  1023   WBC 10*3/mm3  --  6.17 6.41 8.46   HEMOGLOBIN g/dL 8.8* 7.9* 8.0* 8.9*   HEMATOCRIT % 27.5* 24.2* 25.1* 27.6*   PLATELETS 10*3/mm3  --  222 205 236       Results from last 7 days   Lab Units 08/03/23  0424   SODIUM mmol/L 140   POTASSIUM mmol/L 4.0   CHLORIDE mmol/L 105   CO2 mmol/L 25.0   BUN mg/dL 18   CREATININE mg/dL 1.24   GLUCOSE mg/dL 177*   CALCIUM mg/dL 8.1*       Results from last 7 days   Lab Units 08/03/23  0424   ALK PHOS U/L 67   BILIRUBIN mg/dL <0.2   ALT (SGPT) U/L 15   AST (SGOT) U/L 13           Results from last 7 days   Lab Units 08/02/23  1023   CRP mg/dL 0.59*       Results from last 7 days   Lab Units 08/02/23  1023   VANCOMYCIN RM mcg/mL 21.50       Results from last 7 days   Lab Units 08/03/23  0424   LACTATE mmol/L 0.9       Estimated Creatinine Clearance: 49.9 mL/min (by C-G formula based on SCr of 1.24 mg/dL).  CPK          8/2/2023    10:23   Common Labsle   Creatine Kinase 463       Procalitonin Results:      Lab 08/02/23  1023   PROCALCITONIN 0.09        Brief Urine Lab Results  (Last result in the past 365 days)        Color   Clarity   Blood   Leuk Est   Nitrite   Protein   CREAT   Urine HCG        08/02/23 1319 Yellow   Clear   Negative   Negative   Negative   30 mg/dL (1+)                  No results found for: SITE, ALLENTEST, PHART, QII1FBD, PO2ART, NHH2FWN, BASEEXCESS, P7JIBBGB, HGBBG, HCTABG, OXYHEMOGLOBI, METHHGBN, CARBOXYHGB, CO2CT, BAROMETRIC, MODALITY, FIO2     Microbiology:  Blood Culture   Date Value Ref Range Status   07/31/2023 No growth at 24 hours  Preliminary   07/31/2023 No growth at 24 hours  Preliminary     No results found for: BCIDPCR, CXREFLEX, CSFCX, CULTURETIS  No results found for: CULTURES, HSVCX, URCX  No results found for: EYECULTURE, GCCX, HSVCULTURE, LABHSV  No results found for: LEGIONELLA, MRSACX,  MUMPSCX, MYCOPLASCX  No results found for: NOCARDIACX, STOOLCX  No results found for: THROATCX, UNSTIMCULT, URINECX, CULTURE, VZVCULTUR  No results found for: VIRALCULTU, WOUNDCX     Blood Culture   Date Value Ref Range Status   07/31/2023 No growth at 24 hours  Preliminary   07/31/2023 No growth at 24 hours  Preliminary   (  Respiratory PCR -7/31.    Radiology:  Imaging Results (Last 72 Hours)       ** No results found for the last 72 hours. **            IMPRESSION:     1.  Sepsis, present on admission, sources could include a cystitis with hematuria but no significant pyuria, less likely aspiration pneumonia left lower lobe versus atelectasis, possible encephalitis not seen on CT scan, and unable to get lumbar puncture secondary to lack of cooperation, versus other.  Usual organisms would include Staphylococcus, gram-negative rods, HSV.  Clinically resolved.  2.  Encephalopathy, toxic and metabolic.  Also positive drug screen for benzodiazepines.  He also has some baseline psychiatric issues.  Clinically seems at baseline.  2a.  Doubt encephalitis.  3.  Leukocytosis, neutrophilic related to above issues.  Resolved.  4.  Anemia, chronic disease.  Ongoing.  5.  Hematuria, related to cystitis versus other.  Clinically resolved.  6.  Diabetes mellitus type 2 with increased risk for infection.  7.  Hypocalcemia 8.1, continues.    Mental status still an issue off and on.    Plan:    1.  Diagnostically, continue to follow patient's physical exam, CBC, CMP, CRP.  Urine culture ordered and not obtained.  2.  Therapeutically, completed doxycycline and cefuroxime until 8/8/2023, then follow off antibiotics.  3.  Supportive care.    Placement.    I discussed the patient's findings and my recommendations with patient and nursing staff    Thank you for asking me to see Omkar Nava.  Our group would be pleased to follow this patient over the course of their hospitalization and assist with outpatient antimicrobial therapy, as  indicated.  Further recommendations depend on the results of the cultures and clinical course.  Increased risk for adverse drug reactions, complications of IV access, readmission.  I discussed with the patient's family.  Discussed previously with Dr. Danielson.      Rayray Gordon MD  8/9/2023

## 2023-08-09 NOTE — PLAN OF CARE
Goal Outcome Evaluation:              Outcome Evaluation: Pt rested well on shift. VSS. RA Pt denies pain. Will continue plan of care.

## 2023-08-09 NOTE — DISCHARGE PLACEMENT REQUEST
"Case Management  Haylee Oneil, [phone]     Prema Sung (67 y.o. Male)       Date of Birth   1955    Social Security Number       Address   [address] [place] [address]    Home Phone   [phone]    MRN   [id number]       Quaker   None    Marital Status   Single                            Admission Date   7/31/23    Admission Type   Emergency    Admitting Provider   Estela Dos Santos MD    Attending Provider   Estela Dos Santos MD    Department, Room/Bed   Psychiatric 6B, N639/1       Discharge Date       Discharge Disposition   Rehab Facility or Unit (DC - External)    Discharge Destination                                 Attending Provider: Estela Dos Santos MD    Allergies: No Known Allergies    Isolation: None   Infection: MRSA (07/31/23)   Code Status: CPR    Ht: 152.4 cm (60\")   Wt: 59.4 kg (131 lb)    Admission Cmt: None   Principal Problem: AMS (altered mental status) [R41.82]                   Active Insurance as of 7/31/2023       Primary Coverage       Payor Plan Insurance Group Employer/Plan Group    MEDICARE MEDICARE A & B        Payor Plan Address Payor Plan Phone Number Payor Plan Fax Number Effective Dates    [address] [phone]  8/1/2022 - None Entered    [facility] [address]         Subscriber Name Subscriber Birth Date Member ID       PREMA SUNG 1955 [id number]               Secondary Coverage       Payor Plan Insurance Group Employer/Plan Group    [place]        Payor Plan Address Payor Plan Phone Number Payor Plan Fax Number Effective Dates    [address] [phone]  3/6/2023 - None Entered    [place] [address]         Subscriber Name Subscriber Birth Date Member ID       PREMA SUNG 1955 [id number]                     Emergency Contacts        (Rel.) Home Phone Work Phone Mobile Phone    Idalia Lerma (Daughter) -- -- [phone]    Clive Hortonevelin (Sister) [phone] -- -- "                 Discharge Summary        Jeniffer Winkler, APRN at 23 1353              [facility]  DISCHARGE SUMMARY    Patient Name: Omkar Nava  : 1955  MRN: [id number]    Date of Admission: 2023 11:12 AM  Date of Discharge:  2023  Primary Care Physician: Deann Cao MD    Consults       Date and Time Order Name Status Description    2023  8:40 AM Inpatient Infectious Diseases Consult Completed             Hospital Course       Active Hospital Problems    Diagnosis  POA    **AMS (altered mental status) [R41.82]  Yes    S/P transmetatarsal amputation of foot, left [Z89.432]  Not Applicable    Neurocognitive disorder [R41.9]  Yes    Type 2 diabetes mellitus [E11.9]  Yes    Essential hypertension [I10]  Yes    Psychotic disorder [F29]  Yes      Resolved Hospital Problems   No resolved problems to display.          Hospital Course:  Omkar Nava is a 67 y.o. male   with PMHx significant for dementia w/psychosis, DMII, HTN, polysubstance abuse, osteomyelitis L foot s/p left transmetatarsal amputation 2022 who currently resides in nursing home who presented with AMS and fever.       AMS   Fever/Sepsis - concern for CNS infection  - empirically covered for meningitis initially  - LP attempted in the ER as well as IR without success -mostly due to agitation  - infectious work-up including UA, resp PCR, CT abd/pelvis, CT head, CXR unremarkable for clear source of fever  - could consider partially treated cystitis as etiology, however UA is bland  - ID following s/p IV vanc and rocephin changed to PO doxy and cefuroxime.   Stopped acyclovir.  prior provider D/w Dr. Gordon 23 who believes patient close to baseline and with difficulties with patient cooperating with LP  ok with not attempting again.   --appears resolved and antibiotic therapy completed     Dementia w/Psychosis:  -- Depakote, PRN atarax   - will discontinue ativan per  family request.  Continue geodon as needed has not had a dose      Iron Def anemia  -- cont oral iron and vitamin C  -- baseline hgb 7-9  -- H/H stable      DMII  - SSI coverage for now  -- add basal and bolus      HTN:  - continue home regimen, PRN IV medications  --despite increased Hydralazine, cont to require PRN for HTN  -- Norvasc 5 mg added, improved          Discharge Follow Up Recommendations for outpatient labs/diagnostics:   PCP 1 week     Day of Discharge     HPI:   No pain. Eating [place] fried chicken in bed. NAD. No new needs from staff. NO f/c, n/v/d, soa or cp     Review of Systems  All other systems negative     Vital Signs:   Temp:  [96.4 øF (35.8 øC)-98.7 øF (37.1 øC)] 96.4 øF (35.8 øC)  Heart Rate:  [69-75] 70  Resp:  [16-18] 18  BP: (119-178)/(62-94) 176/92      Physical Exam:  Constitutional: No acute distress, awake, alert  HENT: NCAT, mucous membranes moist  Respiratory: Clear to auscultation bilaterally, respiratory effort normal   Cardiovascular: RRR, no murmurs, rubs, or gallops  Gastrointestinal: Positive bowel sounds, soft, nontender, nondistended  Musculoskeletal: No bilateral ankle edema  Psychiatric: Appropriate affect, cooperative  Neurologic: Oriented x 3, TREJO, speech clear  Skin: No rashes     Pertinent  and/or Most Recent Results     LAB RESULTS:      Lab 08/07/23  1440 08/05/23  0635 08/03/23  0424   WBC  --  6.17 6.41   HEMOGLOBIN 8.8* 7.9* 8.0*   HEMATOCRIT 27.5* 24.2* 25.1*   PLATELETS  --  222 205   NEUTROS ABS  --   --  3.52   IMMATURE GRANS (ABS)  --   --  0.02   LYMPHS ABS  --   --  1.91   MONOS ABS  --   --  0.66   EOS ABS  --   --  0.28   MCV  --  88.3 88.4   LACTATE  --   --  0.9         Lab 08/03/23  0424   SODIUM 140   POTASSIUM 4.0   CHLORIDE 105   CO2 25.0   ANION GAP 10.0   BUN 18   CREATININE 1.24   EGFR 64.5   GLUCOSE 177*   CALCIUM 8.1*         Lab 08/03/23  0424   TOTAL PROTEIN 5.6*   ALBUMIN 3.1*   GLOBULIN 2.5   ALT (SGPT) 15   AST (SGOT) 13   BILIRUBIN <0.2    ALK PHOS 67                 Lab 08/05/23  0635   IRON 40*   IRON SATURATION (TSAT) 14*   TIBC 282*   TRANSFERRIN 189*   FERRITIN 136.20   FOLATE 8.68   VITAMIN B 12 507         Brief Urine Lab Results  (Last result in the past 365 days)        Color   Clarity   Blood   Leuk Est   Nitrite   Protein   CREAT   Urine HCG        08/02/23 1319 Yellow   Clear   Negative   Negative   Negative   30 mg/dL (1+)                 Microbiology Results (last 10 days)       Procedure Component Value - Date/Time    Respiratory Panel PCR w/COVID-19(SARS-CoV-2) GIANNI/SIENNA/ANNA/PAD/COR/MAD/ASHIA In-House, NP Swab in UTM/VTM, 3-4 HR TAT - Swab, Nasopharynx [[id number]]  (Normal) Collected: 07/31/23 1328    Lab Status: Final result Specimen: Swab from Nasopharynx Updated: 07/31/23 1439     ADENOVIRUS, PCR Not Detected     Coronavirus 229E Not Detected     Coronavirus HKU1 Not Detected     Coronavirus NL63 Not Detected     Coronavirus OC43 Not Detected     COVID19 Not Detected     Human Metapneumovirus Not Detected     Human Rhinovirus/Enterovirus Not Detected     Influenza A PCR Not Detected     Influenza B PCR Not Detected     Parainfluenza Virus 1 Not Detected     Parainfluenza Virus 2 Not Detected     Parainfluenza Virus 3 Not Detected     Parainfluenza Virus 4 Not Detected     RSV, PCR Not Detected     Bordetella pertussis pcr Not Detected     Bordetella parapertussis PCR Not Detected     Chlamydophila pneumoniae PCR Not Detected     Mycoplasma pneumo by PCR Not Detected    Narrative:      In the setting of a positive respiratory panel with a viral infection PLUS a negative procalcitonin without other underlying concern for bacterial infection, consider observing off antibiotics or discontinuation of antibiotics and continue supportive care. If the respiratory panel is positive for atypical bacterial infection (Bordetella pertussis, Chlamydophila pneumoniae, or Mycoplasma pneumoniae), consider antibiotic de-escalation to target atypical  bacterial infection.    Blood Culture - Blood, Hand, Left [[id number]]  (Normal) Collected: 07/31/23 1246    Lab Status: Final result Specimen: Blood from Hand, Left Updated: 08/05/23 1316     Blood Culture No growth at 5 days    Blood Culture - Blood, Arm, Right [[id number]]  (Normal) Collected: 07/31/23 1246    Lab Status: Final result Specimen: Blood from Arm, Right Updated: 08/05/23 1316     Blood Culture No growth at 5 days            CT Head Without Contrast    Result Date: 7/31/2023  CT HEAD WO CONTRAST Date of Exam: 7/31/2023 2:33 PM EDT Indication: AMS. Comparison: 4/29/2022 MR brain Technique: Axial CT images were obtained of the head without contrast administration.  Automated exposure control and iterative construction methods were used. Findings: Parenchyma:No acute intraparenchymal hemorrhage. No loss of gray-white differentiation to suggest large territory infarct. Mild parenchymal volume loss. Scattered periventricular and subcortical white matter hypodensities, nonspecific, but most often consistent with small vessel ischemic changes. No midline shift or herniation. Ventricles and extra axial spaces:Prominent ventricles and sulci secondary to volume loss. No extra axial fluid collection seen. Other: Right lens replacement. Paranasal sinuses are clear. Mastoid air cells are clear. Calvarium is intact. Intracranial atherosclerotic calcification is present.     Impression: No evidence of acute hemorrhage or large territory infarct Chronic changes as above. Electronically Signed: Anil Krishnamurthy  7/31/2023 2:54 PM EDT  Workstation ID: [id number]    CT Abdomen Pelvis With Contrast    Result Date: 7/31/2023  CT ABDOMEN PELVIS W CONTRAST Date of Exam: 7/31/2023 2:33 PM EDT Indication: ams. Comparison: CT of the abdomen and pelvis dated 5/6/2017 Technique: Axial CT images were obtained of the abdomen and pelvis following the uneventful intravenous administration of 85 mL Isovue-300. Reconstructed coronal and  sagittal images were also obtained. Automated exposure control and iterative construction methods were used. Findings: Examination is limited due to motion artifact. Liver: The liver is unremarkable in morphology. No focal liver lesion is seen. No biliary dilation is seen. Gallbladder: Gallbladder is not well-visualized due to motion artifact. Pancreas: Unremarkable. Spleen: Unremarkable. Adrenal glands: Unremarkable. Genitourinary tract: Kidneys and ureters appear unremarkable. Bladder wall thickening may be related to underdistention or cystitis. Prostate gland is unremarkable. Gastrointestinal tract: Moderate colonic stool is present. Scattered colonic diverticula are seen. No findings to suggest bowel obstruction. Appendix: No findings to suggest acute appendicitis. Other findings: No free air or free fluid is identified. No pathologically enlarged lymph nodes are seen. Vascular calcifications are seen. IVC is unremarkable. Bones and soft tissues: No acute osseous lesion is identified. There are mild degenerative changes of the spine. There is 10 mm anterolisthesis at L4-L5. Superficial soft tissues demonstrate no acute abnormality. Lung bases: Scattered bibasilar atelectasis.     Impression: 1.Examination is limited due to motion artifact. 2.Given this limitation, no acute abnormality identified within the abdomen or pelvis. 3.Moderate colonic stool. 4.Bladder wall thickening may be related to underdistention or cystitis. Please correlate with urinalysis. 5.Additional findings as detailed above. Electronically Signed: Ryan Perales  7/31/2023 2:59 PM EDT  Workstation ID: [id number]    XR Chest 1 View    Result Date: 7/31/2023  XR CHEST 1 VW Date of Exam: 7/31/2023 1:02 PM EDT Indication: AMS Comparison: 12/10/2022 Findings: Heart shadow is mildly enlarged. Vasculature is cephalized. Minimal if any perihilar edema present; overall pattern is similar to the prior study. There may be a small area of focal  atelectasis in the medial left lung base. No other focal lung disease, effusion or pneumothorax is seen.     Impression: Moderate pulmonary venous hypertension. Small focus of left lower lobe atelectasis. Electronically Signed: Wisam Enriquezd  7/31/2023 1:35 PM EDT  Workstation ID: [id number]                 Discharge Details        Discharge Medications        New Medications        Instructions Start Date   amLODIPine 5 MG tablet  Commonly known as: NORVASC   5 mg, Oral, Every 24 Hours Scheduled   Start Date: August 10, 2023     ascorbic acid 500 MG tablet  Commonly known as: VITAMIN C   500 mg, Oral, Daily   Start Date: August 10, 2023     Insulin Lispro 100 UNIT/ML injection  Commonly known as: humaLOG   5 Units, Subcutaneous, 3 Times Daily With Meals             Changes to Medications        Instructions Start Date   hydrALAZINE 25 MG tablet  Commonly known as: APRESOLINE  What changed: how much to take   75 mg, Oral, Every 8 Hours Scheduled      insulin glargine 100 UNIT/ML injection  Commonly known as: LANTUS, SEMGLEE  What changed:   how much to take  when to take this   5 Units, Subcutaneous, 2 Times Daily             Continue These Medications        Instructions Start Date   acetaminophen 325 MG tablet  Commonly known as: TYLENOL   650 mg, Oral, Every 6 Hours PRN      albuterol sulfate  (90 Base) MCG/ACT inhaler  Commonly known as: PROVENTIL HFA;VENTOLIN HFA;PROAIR HFA   2 puffs, Inhalation, Every 4 Hours PRN      apixaban 2.5 MG tablet tablet  Commonly known as: ELIQUIS   2.5 mg, Oral, 2 Times Daily, VTE ppx      brimonidine-timolol 0.2-0.5 % ophthalmic solution  Commonly known as: COMBIGAN   1 drop, Both Eyes, 2 Times Daily      Cholecalciferol 50 MCG (2000 UT) capsule   3 capsules, Oral, Every Morning      difluprednate 0.05 % ophthalmic emulsion  Commonly known as: DUREZOL   1 drop, Left Eye, 4 Times Daily      divalproex 125 MG DR tablet  Commonly known as: DEPAKOTE   250 mg, Oral, 2 Times Daily       famotidine 20 MG tablet  Commonly known as: PEPCID   20 mg, Oral, 2 Times Daily      ferrous sulfate 325 (65 FE) MG tablet   325 mg, Oral, Daily With Breakfast      Glucagon HCl 1 MG reconstituted solution   1 mg, Intramuscular, As Needed      hydrOXYzine 50 MG tablet  Commonly known as: ATARAX   50 mg, Oral, Every 6 Hours PRN      lisinopril 40 MG tablet  Commonly known as: PRINIVIL,ZESTRIL   40 mg, Oral, Daily   Start Date: August 10, 2023     melatonin 5 MG tablet tablet   5 mg, Oral, Nightly      Nepafenac 0.3 % suspension   1 drop, Left Eye, Every Night at Bedtime, Start on 8-9-23 for 2 days      thiamine 100 MG tablet  tablet  Commonly known as: VITAMIN B-1   100 mg, Oral, 3 Times Daily             Stop These Medications      Aquaphilic ointment     atorvastatin 20 MG tablet  Commonly known as: LIPITOR     cloNIDine 0.1 MG tablet  Commonly known as: CATAPRES     furosemide 20 MG tablet  Commonly known as: LASIX     LORazepam 0.5 MG tablet  Commonly known as: ATIVAN     metFORMIN 1000 MG tablet  Commonly known as: GLUCOPHAGE              No Known Allergies      Discharge Disposition:  Rehab Facility or Unit (DC - External)    Diet:  Hospital:  Diet Order   Procedures    Diet: Cardiac Diets, Diabetic Diets; Healthy Heart (2-3 Na+); Consistent Carbohydrate; Texture: Soft to Chew (NDD 3); Soft to Chew: Whole Meat; Fluid Consistency: Thin (IDDSI 0)            CODE STATUS:    Code Status and Medical Interventions:   Ordered at: 07/31/23 1801     Level Of Support Discussed With:    Health Care Surrogate     Code Status (Patient has no pulse and is not breathing):    CPR (Attempt to Resuscitate)     Medical Interventions (Patient has pulse or is breathing):    Full Support     Release to patient:    Routine Release       No future appointments.              Jeniffer Winkler, APRN  08/09/23      Time Spent on Discharge:  I spent  45  minutes on this discharge activity which included: face-to-face encounter with the  patient, reviewing the data in the system, coordination of the care with the nursing staff as well as consultants, documentation, and entering orders.            Electronically signed by Jeniffer Winkler APRN at 08/09/23 1400

## 2023-08-09 NOTE — PLAN OF CARE
Goal Outcome Evaluation:      Discharging to [facility], report called to Yuliet at 1423.

## 2023-08-09 NOTE — DISCHARGE SUMMARY
[facility] Medicine Services  DISCHARGE SUMMARY    Patient Name: Omkar Nava  : 1955  MRN: [id number]    Date of Admission: 2023 11:12 AM  Date of Discharge:  2023  Primary Care Physician: Deann Cao MD    Consults       Date and Time Order Name Status Description    2023  8:40 AM Inpatient Infectious Diseases Consult Completed             Hospital Course       Active Hospital Problems    Diagnosis  POA    **AMS (altered mental status) [R41.82]  Yes    S/P transmetatarsal amputation of foot, left [Z89.432]  Not Applicable    Neurocognitive disorder [R41.9]  Yes    Type 2 diabetes mellitus [E11.9]  Yes    Essential hypertension [I10]  Yes    Psychotic disorder [F29]  Yes      Resolved Hospital Problems   No resolved problems to display.          Hospital Course:  Omkar Nava is a 67 y.o. male   with PMHx significant for dementia w/psychosis, DMII, HTN, polysubstance abuse, osteomyelitis L foot s/p left transmetatarsal amputation 2022 who currently resides in nursing home who presented with AMS and fever.       AMS   Fever/Sepsis - concern for CNS infection  - empirically covered for meningitis initially  - LP attempted in the ER as well as IR without success -mostly due to agitation  - infectious work-up including UA, resp PCR, CT abd/pelvis, CT head, CXR unremarkable for clear source of fever  - could consider partially treated cystitis as etiology, however UA is bland  - ID following s/p IV vanc and rocephin changed to PO doxy and cefuroxime.   Stopped acyclovir.  prior provider D/w Dr. Gordon 23 who believes patient close to baseline and with difficulties with patient cooperating with LP  ok with not attempting again.   --appears resolved and antibiotic therapy completed     Dementia w/Psychosis:  -- Depakote, PRN atarax   - will discontinue ativan per family request.  Continue geodon as needed has not had a dose      Iron Def anemia  --  cont oral iron and vitamin C  -- baseline hgb 7-9  -- H/H stable      DMII  - SSI coverage for now  -- add basal and bolus      HTN:  - continue home regimen, PRN IV medications  --despite increased Hydralazine, cont to require PRN for HTN  -- Norvasc 5 mg added, improved          Discharge Follow Up Recommendations for outpatient labs/diagnostics:   PCP 1 week     Day of Discharge     HPI:   No pain. Eating [place] fried chicken in bed. NAD. No new needs from staff. NO f/c, n/v/d, soa or cp     Review of Systems  All other systems negative     Vital Signs:   Temp:  [96.4 øF (35.8 øC)-98.7 øF (37.1 øC)] 96.4 øF (35.8 øC)  Heart Rate:  [69-75] 70  Resp:  [16-18] 18  BP: (119-178)/(62-94) 176/92      Physical Exam:  Constitutional: No acute distress, awake, alert  HENT: NCAT, mucous membranes moist  Respiratory: Clear to auscultation bilaterally, respiratory effort normal   Cardiovascular: RRR, no murmurs, rubs, or gallops  Gastrointestinal: Positive bowel sounds, soft, nontender, nondistended  Musculoskeletal: No bilateral ankle edema  Psychiatric: Appropriate affect, cooperative  Neurologic: Oriented x 3, TREJO, speech clear  Skin: No rashes     Pertinent  and/or Most Recent Results     LAB RESULTS:      Lab 08/07/23  1440 08/05/23  0635 08/03/23  0424   WBC  --  6.17 6.41   HEMOGLOBIN 8.8* 7.9* 8.0*   HEMATOCRIT 27.5* 24.2* 25.1*   PLATELETS  --  222 205   NEUTROS ABS  --   --  3.52   IMMATURE GRANS (ABS)  --   --  0.02   LYMPHS ABS  --   --  1.91   MONOS ABS  --   --  0.66   EOS ABS  --   --  0.28   MCV  --  88.3 88.4   LACTATE  --   --  0.9         Lab 08/03/23  0424   SODIUM 140   POTASSIUM 4.0   CHLORIDE 105   CO2 25.0   ANION GAP 10.0   BUN 18   CREATININE 1.24   EGFR 64.5   GLUCOSE 177*   CALCIUM 8.1*         Lab 08/03/23  0424   TOTAL PROTEIN 5.6*   ALBUMIN 3.1*   GLOBULIN 2.5   ALT (SGPT) 15   AST (SGOT) 13   BILIRUBIN <0.2   ALK PHOS 67                 Lab 08/05/23  0635   IRON 40*   IRON SATURATION (TSAT) 14*    TIBC 282*   TRANSFERRIN 189*   FERRITIN 136.20   FOLATE 8.68   VITAMIN B 12 507         Brief Urine Lab Results  (Last result in the past 365 days)        Color   Clarity   Blood   Leuk Est   Nitrite   Protein   CREAT   Urine HCG        08/02/23 1319 Yellow   Clear   Negative   Negative   Negative   30 mg/dL (1+)                 Microbiology Results (last 10 days)       Procedure Component Value - Date/Time    Respiratory Panel PCR w/COVID-19(SARS-CoV-2) GIANNI/SIENNA/ANNA/PAD/COR/MAD/ASHIA In-House, NP Swab in UTM/VTM, 3-4 HR TAT - Swab, Nasopharynx [[id number]]  (Normal) Collected: 07/31/23 1328    Lab Status: Final result Specimen: Swab from Nasopharynx Updated: 07/31/23 1439     ADENOVIRUS, PCR Not Detected     Coronavirus 229E Not Detected     Coronavirus HKU1 Not Detected     Coronavirus NL63 Not Detected     Coronavirus OC43 Not Detected     COVID19 Not Detected     Human Metapneumovirus Not Detected     Human Rhinovirus/Enterovirus Not Detected     Influenza A PCR Not Detected     Influenza B PCR Not Detected     Parainfluenza Virus 1 Not Detected     Parainfluenza Virus 2 Not Detected     Parainfluenza Virus 3 Not Detected     Parainfluenza Virus 4 Not Detected     RSV, PCR Not Detected     Bordetella pertussis pcr Not Detected     Bordetella parapertussis PCR Not Detected     Chlamydophila pneumoniae PCR Not Detected     Mycoplasma pneumo by PCR Not Detected    Narrative:      In the setting of a positive respiratory panel with a viral infection PLUS a negative procalcitonin without other underlying concern for bacterial infection, consider observing off antibiotics or discontinuation of antibiotics and continue supportive care. If the respiratory panel is positive for atypical bacterial infection (Bordetella pertussis, Chlamydophila pneumoniae, or Mycoplasma pneumoniae), consider antibiotic de-escalation to target atypical bacterial infection.    Blood Culture - Blood, Hand, Left [[id number]]  (Normal) Collected:  07/31/23 1246    Lab Status: Final result Specimen: Blood from Hand, Left Updated: 08/05/23 1316     Blood Culture No growth at 5 days    Blood Culture - Blood, Arm, Right [[id number]]  (Normal) Collected: 07/31/23 1246    Lab Status: Final result Specimen: Blood from Arm, Right Updated: 08/05/23 1316     Blood Culture No growth at 5 days            CT Head Without Contrast    Result Date: 7/31/2023  CT HEAD WO CONTRAST Date of Exam: 7/31/2023 2:33 PM EDT Indication: AMS. Comparison: 4/29/2022 MR brain Technique: Axial CT images were obtained of the head without contrast administration.  Automated exposure control and iterative construction methods were used. Findings: Parenchyma:No acute intraparenchymal hemorrhage. No loss of gray-white differentiation to suggest large territory infarct. Mild parenchymal volume loss. Scattered periventricular and subcortical white matter hypodensities, nonspecific, but most often consistent with small vessel ischemic changes. No midline shift or herniation. Ventricles and extra axial spaces:Prominent ventricles and sulci secondary to volume loss. No extra axial fluid collection seen. Other: Right lens replacement. Paranasal sinuses are clear. Mastoid air cells are clear. Calvarium is intact. Intracranial atherosclerotic calcification is present.     Impression: No evidence of acute hemorrhage or large territory infarct Chronic changes as above. Electronically Signed: Anil Krishnamurthy  7/31/2023 2:54 PM EDT  Workstation ID: [id number]    CT Abdomen Pelvis With Contrast    Result Date: 7/31/2023  CT ABDOMEN PELVIS W CONTRAST Date of Exam: 7/31/2023 2:33 PM EDT Indication: ams. Comparison: CT of the abdomen and pelvis dated 5/6/2017 Technique: Axial CT images were obtained of the abdomen and pelvis following the uneventful intravenous administration of 85 mL Isovue-300. Reconstructed coronal and sagittal images were also obtained. Automated exposure control and iterative construction  methods were used. Findings: Examination is limited due to motion artifact. Liver: The liver is unremarkable in morphology. No focal liver lesion is seen. No biliary dilation is seen. Gallbladder: Gallbladder is not well-visualized due to motion artifact. Pancreas: Unremarkable. Spleen: Unremarkable. Adrenal glands: Unremarkable. Genitourinary tract: Kidneys and ureters appear unremarkable. Bladder wall thickening may be related to underdistention or cystitis. Prostate gland is unremarkable. Gastrointestinal tract: Moderate colonic stool is present. Scattered colonic diverticula are seen. No findings to suggest bowel obstruction. Appendix: No findings to suggest acute appendicitis. Other findings: No free air or free fluid is identified. No pathologically enlarged lymph nodes are seen. Vascular calcifications are seen. IVC is unremarkable. Bones and soft tissues: No acute osseous lesion is identified. There are mild degenerative changes of the spine. There is 10 mm anterolisthesis at L4-L5. Superficial soft tissues demonstrate no acute abnormality. Lung bases: Scattered bibasilar atelectasis.     Impression: 1.Examination is limited due to motion artifact. 2.Given this limitation, no acute abnormality identified within the abdomen or pelvis. 3.Moderate colonic stool. 4.Bladder wall thickening may be related to underdistention or cystitis. Please correlate with urinalysis. 5.Additional findings as detailed above. Electronically Signed: Ryan Perales  7/31/2023 2:59 PM EDT  Workstation ID: [id number]    XR Chest 1 View    Result Date: 7/31/2023  XR CHEST 1 VW Date of Exam: 7/31/2023 1:02 PM EDT Indication: AMS Comparison: 12/10/2022 Findings: Heart shadow is mildly enlarged. Vasculature is cephalized. Minimal if any perihilar edema present; overall pattern is similar to the prior study. There may be a small area of focal atelectasis in the medial left lung base. No other focal lung disease, effusion or pneumothorax is  seen.     Impression: Moderate pulmonary venous hypertension. Small focus of left lower lobe atelectasis. Electronically Signed: Wisam Enriquezd  7/31/2023 1:35 PM EDT  Workstation ID: [id number]                 Discharge Details        Discharge Medications        New Medications        Instructions Start Date   amLODIPine 5 MG tablet  Commonly known as: NORVASC   5 mg, Oral, Every 24 Hours Scheduled   Start Date: August 10, 2023     ascorbic acid 500 MG tablet  Commonly known as: VITAMIN C   500 mg, Oral, Daily   Start Date: August 10, 2023     Insulin Lispro 100 UNIT/ML injection  Commonly known as: humaLOG   5 Units, Subcutaneous, 3 Times Daily With Meals             Changes to Medications        Instructions Start Date   hydrALAZINE 25 MG tablet  Commonly known as: APRESOLINE  What changed: how much to take   75 mg, Oral, Every 8 Hours Scheduled      insulin glargine 100 UNIT/ML injection  Commonly known as: LANLEV ANGELGLEE  What changed:   how much to take  when to take this   5 Units, Subcutaneous, 2 Times Daily             Continue These Medications        Instructions Start Date   acetaminophen 325 MG tablet  Commonly known as: TYLENOL   650 mg, Oral, Every 6 Hours PRN      albuterol sulfate  (90 Base) MCG/ACT inhaler  Commonly known as: PROVENTIL HFA;VENTOLIN HFA;PROAIR HFA   2 puffs, Inhalation, Every 4 Hours PRN      apixaban 2.5 MG tablet tablet  Commonly known as: ELIQUIS   2.5 mg, Oral, 2 Times Daily, VTE ppx      brimonidine-timolol 0.2-0.5 % ophthalmic solution  Commonly known as: COMBIGAN   1 drop, Both Eyes, 2 Times Daily      Cholecalciferol 50 MCG (2000 UT) capsule   3 capsules, Oral, Every Morning      difluprednate 0.05 % ophthalmic emulsion  Commonly known as: DUREZOL   1 drop, Left Eye, 4 Times Daily      divalproex 125 MG DR tablet  Commonly known as: DEPAKOTE   250 mg, Oral, 2 Times Daily      famotidine 20 MG tablet  Commonly known as: PEPCID   20 mg, Oral, 2 Times Daily      ferrous  sulfate 325 (65 FE) MG tablet   325 mg, Oral, Daily With Breakfast      Glucagon HCl 1 MG reconstituted solution   1 mg, Intramuscular, As Needed      hydrOXYzine 50 MG tablet  Commonly known as: ATARAX   50 mg, Oral, Every 6 Hours PRN      lisinopril 40 MG tablet  Commonly known as: PRINIVIL,ZESTRIL   40 mg, Oral, Daily   Start Date: August 10, 2023     melatonin 5 MG tablet tablet   5 mg, Oral, Nightly      Nepafenac 0.3 % suspension   1 drop, Left Eye, Every Night at Bedtime, Start on 8-9-23 for 2 days      thiamine 100 MG tablet  tablet  Commonly known as: VITAMIN B-1   100 mg, Oral, 3 Times Daily             Stop These Medications      Aquaphilic ointment     atorvastatin 20 MG tablet  Commonly known as: LIPITOR     cloNIDine 0.1 MG tablet  Commonly known as: CATAPRES     furosemide 20 MG tablet  Commonly known as: LASIX     LORazepam 0.5 MG tablet  Commonly known as: ATIVAN     metFORMIN 1000 MG tablet  Commonly known as: GLUCOPHAGE              No Known Allergies      Discharge Disposition:  Rehab Facility or Unit (DC - External)    Diet:  Hospital:  Diet Order   Procedures    Diet: Cardiac Diets, Diabetic Diets; Healthy Heart (2-3 Na+); Consistent Carbohydrate; Texture: Soft to Chew (NDD 3); Soft to Chew: Whole Meat; Fluid Consistency: Thin (IDDSI 0)            CODE STATUS:    Code Status and Medical Interventions:   Ordered at: 07/31/23 1801     Level Of Support Discussed With:    Health Care Surrogate     Code Status (Patient has no pulse and is not breathing):    CPR (Attempt to Resuscitate)     Medical Interventions (Patient has pulse or is breathing):    Full Support     Release to patient:    Routine Release       No future appointments.              Jeniffer Winkler, APRN  08/09/23      Time Spent on Discharge:  I spent  45  minutes on this discharge activity which included: face-to-face encounter with the patient, reviewing the data in the system, coordination of the care with the nursing staff as  well as consultants, documentation, and entering orders.

## 2023-08-09 NOTE — CASE MANAGEMENT/SOCIAL WORK
Case Management Discharge Note      Final Note: Mr Nava will be returning to [facility] today.  I have called and spoke with admissions, and they are expecting him.  Report can be called to [phone] and ask to give report on Mr Nava.  [facility] uses [facility] and the pharmacy has been switched in [facility].  [facility] requested that a discharge summary be faxed to [phone].  I have called and spoken with daughter Idalia, who states that she will be able to transport him at discharge.         Selected Continued Care - Admitted Since 7/31/2023       Destination Coordination complete.      Service Provider Selected Services Address Phone Fax Patient Preferred    [facility] [address] [facility] [address] [phone] --              Durable Medical Equipment    No services have been selected for the patient.                Dialysis/Infusion    No services have been selected for the patient.                Home Medical Care    No services have been selected for the patient.                Therapy    No services have been selected for the patient.                Community Resources    No services have been selected for the patient.                Community & DME    No services have been selected for the patient.                         Final Discharge Disposition Code: 04 - intermediate care facility

## 2023-08-14 NOTE — CARE COORDINATION
" Colonoscopy History and Physical      Procedure : Colonoscopy    Indications:  screening exam and hemorrhoids    Family Hx of CRC: No    Last Colonoscopy:  Never    Hx of sedation problems: none  FHX of sedation problems: none    Past Medical History:   Diagnosis Date    Anemia     "life long"    Hypothyroidism     Unspecified hemorrhoids     Unspecified hemorrhoids        Family History   Problem Relation Age of Onset    Hyperlipidemia Mother     Hyperlipidemia Father     Lung cancer Maternal Uncle     Breast cancer Cousin     Colon cancer Neg Hx     Diabetes Neg Hx     Hypertension Neg Hx     Ovarian cancer Neg Hx     Stroke Neg Hx        Social History     Socioeconomic History    Marital status:     Number of children: 3   Tobacco Use    Smoking status: Never    Smokeless tobacco: Never   Substance and Sexual Activity    Alcohol use: Yes     Comment: socially    Drug use: Never    Sexual activity: Yes     Partners: Male     Birth control/protection: See Surgical Hx     Social Determinants of Health     Financial Resource Strain: Low Risk  (8/11/2019)    Overall Financial Resource Strain (CARDIA)     Difficulty of Paying Living Expenses: Not very hard   Food Insecurity: No Food Insecurity (8/11/2019)    Hunger Vital Sign     Worried About Running Out of Food in the Last Year: Never true     Ran Out of Food in the Last Year: Never true   Transportation Needs: No Transportation Needs (8/11/2019)    PRAPARE - Transportation     Lack of Transportation (Medical): No     Lack of Transportation (Non-Medical): No   Physical Activity: Insufficiently Active (8/11/2019)    Exercise Vital Sign     Days of Exercise per Week: 1 day     Minutes of Exercise per Session: 90 min   Stress: No Stress Concern Present (8/11/2019)    [employer] - Occupational Stress Questionnaire     Feeling of Stress : Only a little   Social Connections: Unknown (8/11/2019)    Social Connection and Isolation Panel " Case Management Assessment           Initial Evaluation                Date / Time of Evaluation: 9/27/2022 2:07 PM                 Assessment Completed by: Yair Cheema RN    Patient Name: Robinson Dandy     YOB: 1955  Diagnosis: Wound infection, posttraumatic [T14. 8XXA, L08.9]     Date / Time: 9/26/2022  1:17 PM    Patient Admission Status: Inpatient    If patient is discharged prior to next notation, then this note serves as note for discharge by case management. Current PCP: Evia Bumpers,   Clinic Patient: No    Chart Reviewed: Yes  Patient/ Family Interviewed: Yes    Initial assessment completed at bedside with: pt  dgtr Ronaldo Quiroga and his sister  at  bedside      Hospitalization in the last 30 days: No    Emergency Contacts:  Extended Emergency Contact Information  Primary Emergency Contact: [address] Phone: [phone]  Relation: Child    Advance Directives:   Code Status: Full [address]: No  Financial  Payor: Marva Alva / Plan: MEDICARE PART A AND B / Product Type: *No Product type* /     Pre-cert required for SNF: No    Pharmacy    [address] [facility] [phone] - F [phone]  [address] [place] [address] [address]  Phone: [phone] Fax: [phone]      Potential assistance Purchasing Medications: Potential Assistance Purchasing Medications: No  Does Patient want to participate in local refill/ meds to beds program?:      Meds To Beds General Rules:  1. Can ONLY be done Monday- Friday between 8:30am-5pm  2. Prescription(s) must be in pharmacy by 3pm to be filled same day  3. Copy of patient's insurance/ prescription drug card and patient face sheet must be sent along with the prescription(s)  4. Cost of Rx cannot be added to hospital bill. If financial assistance is needed, please contact unit  or ;   or  CANNOT provide pharmacy voucher for [NHANES]     Frequency of Communication with Friends and Family: More than three times a week     Frequency of Social Gatherings with Friends and Family: Once a week     Active Member of Clubs or Organizations: No     Attends Club or Organization Meetings: Never     Marital Status:        Review of patient's allergies indicates:  No Known Allergies    No current facility-administered medications on file prior to encounter.     Current Outpatient Medications on File Prior to Encounter   Medication Sig Dispense Refill    ferrous sulfate (FEOSOL) 325 mg (65 mg iron) Tab tablet Take 325 mg by mouth daily with breakfast.         Review of Systems -   Respiratory ROS: negative  Cardiovascular ROS: negative  Gastrointestinal ROS: Bright red blood in stool, No changes to bowel habits.   Musculoskeletal ROS: negative  Neurological ROS: negative        Physical Exam:  General: no distress  Head: normocephalic  Lungs:  normal respiratory effort  Heart: regular rate  Abdomen: soft,  Non-tender  Extremities: warm and well perfused       Deep Sedation: Mallampati Score per anesthesia    ASA: II      Patient cleared for Anesthesia:  MAC    Anesthesia/Surgery risks, benefits, and alternative options discussed and understood by patient/family. Patient appears to understand and gives informed consent.    Ethan Devine MD      patients co-pays  5. Patients can then  the prescription on their way out of the hospital at discharge, or pharmacy can deliver to the bedside if staff is available. (payment due at time of pick-up or delivery - cash, check, or card accepted)     Able to afford home medications/ co-pay costs: Yes    ADLS  Support Systems: Children    PT AM-PAC:   /24  OT AM-PAC: 13 /24    [place]: SNF   Steps: NA    Plans to RETURN to current housing: No  Barriers to RETURNING to current housing: request  different  SNF      Home Care Information  Currently ACTIVE with [address]: No  Home Care Agency: Not Applicable    Currently ACTIVE with [place] on Aging: No  Passport/ Waiver: No  Passport/ Waiver Services: Not Applicable    Durable Medical Equipment  DME Provider: defer  Equipment: defer    Home Oxygen and Respiratory Equipment  Has HOME OXYGEN prior to admission: Yes  [address]: Not Applicable  Other Respiratory Equipment: return  to facility w/ O 2 if needed     Informed of need to bring portable home O2 tank on day of DISCHARGE for nursing to connect prior to leaving: No  Verbalized agreement/Understanding: No  Person to bring portable tank at discharge: NA    Dialysis  Active with HD/PD prior to admission: No  Nephrologist: Norma Mckee 61:  Not Applicable    DISCHARGE PLAN:  Disposition:     [facility]    Services Available   Skilled Nursing        Address   [address] [place] [address]    [phone]           Patient  from here  but  family is requesting  different  placement  at  d/c  .       Transportation PLAN for discharge: EMS transportation     Factors facilitating achievement of predicted outcomes: Family support, Cooperative, and Pleasant    Barriers to discharge: Pain, Wound Care, and Podiatry clearance      Additional Case Management Notes:     CM  following for  d/c planning:    Patient was  admitted  from  SNF  : CM spoke with Jossie Quinteros  [phone]  DON at  [facility] who confirmed  pt was there skilled:  and  could return no pre cert  needed once medically stable:    Patient  admitted  with Wound infection  and  Foot Fx  :  Podiatry following:      Patient sister  and  Daughter  at  bedside  are  from [place] and  stated  he was admitted  to [address]  from [facility]  in [place]:      Gen Solomon [phone]     Patient currently NPO :  NWB  PTOT : ID following :    Plans to go to oR for  I & D:    CM  will con to follow and  assist  :    Patient was  prev  from [facility]  and  walking  prior to  Going to SNF :  ( 8/04/2022)     Family would jadyn something  closer  to home in [place] if possible . SW/CM  will cont to follow  and  assist  with referrals/ disposition . The Plan for Transition of Care is related to the following treatment goals of Wound infection, posttraumatic [T14. 8XXA, L08.9]    The Patient and/or patient representative An Lizarraga and his family were provided with a choice of provider and agrees with the discharge plan Yes    Freedom of choice list was provided with basic dialogue that supports the patient's individualized plan of care/goals and shares the quality data associated with the providers.  Yes    Care Transition patient: No    Lisa Wilcox RN  [facility]  Case Management Department  Ph: [phone]

## 2023-11-03 NOTE — PROGRESS NOTES
4 Eyes Admission Assessment     I agree as the admission nurse that 2 RN's have performed a thorough Head to Toe Skin Assessment on the patient. ALL assessment sites listed below have been assessed on admission. Areas assessed by both nurses: Lydia/Adamaris  [x]   Head, Face, and Ears   [x]   Shoulders, Back, and Chest  [x]   Arms, Elbows, and Hands   [x]   Coccyx, Sacrum, and Ischium  [x]   Legs, Feet, and Heels        Does the Patient have Skin Breakdown? No         Marcos Prevention initiated:  No   Wound Care Orders initiated:  No      C nurse consulted for Pressure Injury (Stage 3,4, Unstageable, DTI, NWPT, and Complex wounds) or Marcos score 18 or lower:  Wound to Left Foot being followed by surgery.   (Wrapped by Surgeon in the ED after treatment in the ED.)    Nurse 1 eSignature: Electronically signed by Zaira Rosenberg RN on 9/26/22 at 7:46 PM EDT    **SHARE this note so that the co-signing nurse is able to place an eSignature**    Nurse 2 eSignature: Electronically signed by Bertha Jerome RN on 9/26/22 at 7:48 PM EDT negative

## 2024-01-04 NOTE — PLAN OF CARE
----- Message from Shantel Montanez sent at 1/4/2024 11:21 AM CST -----  Regarding: pt advice  Contact: [phone]  Pt calling in regards to missing previous appt due to being in the hospital but pt is having  a time breathing due to shortens of breath pt requesting a same day appt or something sooner than available.Pls phone doesn't allow messages if pt doesn't answer pl call back.  Pls call      Goal Outcome Evaluation:  Plan of Care Reviewed With: patient           Outcome Evaluation: PT initial evaluation completed for pt presenting with generalized weakness, confusion, impaired balance and coordination, decreased safety awareness, and decreased functional mobility. Pt required modA x2 for STS transfers. Pt's decreased independence warrants PT skilled care. Recommend D/C to SNF.      Anticipated Discharge Disposition (PT): skilled nursing facility

## 2024-01-15 ENCOUNTER — HOSPITAL ENCOUNTER (INPATIENT)
Facility: HOSPITAL | Age: 69
LOS: 6 days | Discharge: NURSING, REHAB OR LONG-TERM CARE | DRG: 177 | End: 2024-01-21
Attending: EMERGENCY MEDICINE | Admitting: STUDENT IN AN ORGANIZED HEALTH CARE EDUCATION/TRAINING PROGRAM
Payer: MEDICARE

## 2024-01-15 ENCOUNTER — APPOINTMENT (OUTPATIENT)
Dept: GENERAL RADIOLOGY | Facility: HOSPITAL | Age: 69
DRG: 177 | End: 2024-01-15
Payer: MEDICARE

## 2024-01-15 DIAGNOSIS — J18.9 MULTIFOCAL PNEUMONIA: ICD-10-CM

## 2024-01-15 DIAGNOSIS — I50.23 ACUTE ON CHRONIC SYSTOLIC CONGESTIVE HEART FAILURE: ICD-10-CM

## 2024-01-15 DIAGNOSIS — J96.01 ACUTE RESPIRATORY FAILURE WITH HYPOXIA: Primary | ICD-10-CM

## 2024-01-15 DIAGNOSIS — Z86.59 HISTORY OF DEMENTIA: ICD-10-CM

## 2024-01-15 PROBLEM — E11.40 TYPE 2 DIABETES MELLITUS WITH DIABETIC NEUROPATHY, UNSPECIFIED: Status: ACTIVE | Noted: 2022-10-05

## 2024-01-15 PROBLEM — Z72.0 TOBACCO USE: Status: ACTIVE | Noted: 2022-06-12

## 2024-01-15 PROBLEM — N52.9 ERECTILE DYSFUNCTION: Status: ACTIVE | Noted: 2021-08-25

## 2024-01-15 PROBLEM — J41.0 SIMPLE CHRONIC BRONCHITIS: Status: ACTIVE | Noted: 2022-10-05

## 2024-01-15 PROBLEM — B96.1 KLEBSIELLA PNEUMONIAE (K. PNEUMONIAE) AS THE CAUSE OF DISEASES CLASSIFIED ELSEWHERE: Status: ACTIVE | Noted: 2022-11-06

## 2024-01-15 PROBLEM — A48.0 GAS GANGRENE: Status: ACTIVE | Noted: 2022-11-06

## 2024-01-15 PROBLEM — S91.312D: Status: ACTIVE | Noted: 2022-11-06

## 2024-01-15 PROBLEM — S09.90XA CLOSED HEAD INJURY WITHOUT LOSS OF CONSCIOUSNESS: Status: ACTIVE | Noted: 2023-01-22

## 2024-01-15 PROBLEM — R13.10 DYSPHAGIA: Status: ACTIVE | Noted: 2023-01-01

## 2024-01-15 PROBLEM — E11.42 POLYNEUROPATHY IN DIABETES: Status: ACTIVE | Noted: 2022-11-06
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
## 2024-01-15 PROBLEM — Z79.4 LONG TERM (CURRENT) USE OF INSULIN: Status: ACTIVE | Noted: 2022-10-05

## 2024-01-15 PROBLEM — L02.612 ABSCESS OF LEFT FOOT: Status: ACTIVE | Noted: 2022-09-27

## 2024-01-15 PROBLEM — J69.0 ASPIRATION PNEUMONIA: Status: ACTIVE | Noted: 2023-01-01

## 2024-01-15 PROBLEM — E11.22 TYPE 2 DIABETES MELLITUS WITH DIABETIC CHRONIC KIDNEY DISEASE: Status: ACTIVE | Noted: 2022-10-05

## 2024-01-15 PROBLEM — M62.81 GENERALIZED MUSCLE WEAKNESS: Status: ACTIVE | Noted: 2023-01-01

## 2024-01-15 PROBLEM — L03.119 CELLULITIS OF LOWER EXTREMITY: Status: ACTIVE | Noted: 2022-11-06

## 2024-01-15 PROBLEM — R09.02 HYPOXIA: Status: ACTIVE | Noted: 2024-01-15

## 2024-01-15 PROBLEM — S92.412B: Status: ACTIVE | Noted: 2022-09-27

## 2024-01-15 PROBLEM — N18.9 CHRONIC KIDNEY DISEASE: Status: ACTIVE | Noted: 2023-12-07

## 2024-01-15 PROBLEM — F03.90 DEMENTIA: Status: ACTIVE | Noted: 2023-01-01

## 2024-01-15 PROBLEM — R19.7 DIARRHEA OF PRESUMED INFECTIOUS ORIGIN: Status: ACTIVE | Noted: 2022-07-30

## 2024-01-15 PROBLEM — E46 PROTEIN CALORIE MALNUTRITION: Status: ACTIVE | Noted: 2023-01-01

## 2024-01-15 PROBLEM — L08.9 WOUND INFECTION, POSTTRAUMATIC: Status: ACTIVE | Noted: 2022-09-26

## 2024-01-15 PROBLEM — G47.01 INSOMNIA DUE TO MEDICAL CONDITION: Status: ACTIVE | Noted: 2022-10-05

## 2024-01-15 PROBLEM — L03.032 CELLULITIS OF LEFT TOE: Status: ACTIVE | Noted: 2022-11-06

## 2024-01-15 PROBLEM — T14.8XXA WOUND INFECTION, POSTTRAUMATIC: Status: ACTIVE | Noted: 2022-09-26

## 2024-01-15 PROBLEM — D50.9 IRON DEFICIENCY ANEMIA: Status: ACTIVE | Noted: 2022-06-12

## 2024-01-15 PROBLEM — I13.10 HYPERTENSIVE HEART AND CHRONIC KIDNEY DISEASE WITHOUT HEART FAILURE, WITH STAGE 1 THROUGH STAGE 4 CHRONIC KIDNEY DISEASE, OR UNSPECIFIED CHRONIC KIDNEY DISEASE: Status: ACTIVE | Noted: 2022-10-05

## 2024-01-15 PROBLEM — M54.2 CERVICAL SPINE PAIN: Status: ACTIVE | Noted: 2023-01-22

## 2024-01-15 PROBLEM — R44.3 HALLUCINATIONS: Status: ACTIVE | Noted: 2023-12-07

## 2024-01-15 PROBLEM — K02.9 DENTAL CAVITIES: Status: ACTIVE | Noted: 2020-07-13

## 2024-01-15 PROBLEM — M86.172 OTHER ACUTE OSTEOMYELITIS, LEFT ANKLE AND FOOT: Status: ACTIVE | Noted: 2022-10-05

## 2024-01-15 PROBLEM — Z86.14 PERSONAL HISTORY OF METHICILLIN RESISTANT STAPHYLOCOCCUS AUREUS INFECTION: Status: ACTIVE | Noted: 2022-11-06

## 2024-01-15 PROBLEM — D63.8 ANEMIA OF CHRONIC DISEASE: Status: ACTIVE | Noted: 2022-11-06

## 2024-01-15 LAB
ALBUMIN SERPL-MCNC: 3.5 G/DL (ref 3.5–5.2)
ALBUMIN/GLOB SERPL: 1 G/DL
ALP SERPL-CCNC: 110 U/L (ref 39–117)
ALT SERPL W P-5'-P-CCNC: 47 U/L (ref 1–41)
ANION GAP SERPL CALCULATED.3IONS-SCNC: 10 MMOL/L (ref 5–15)
ANION GAP SERPL CALCULATED.3IONS-SCNC: 8 MMOL/L (ref 5–15)
ANION GAP SERPL CALCULATED.3IONS-SCNC: 9 MMOL/L (ref 5–15)
ARTERIAL PATENCY WRIST A: ABNORMAL
AST SERPL-CCNC: 32 U/L (ref 1–40)
ATMOSPHERIC PRESS: ABNORMAL MM[HG]
B PARAPERT DNA SPEC QL NAA+PROBE: NOT DETECTED
B PERT DNA SPEC QL NAA+PROBE: NOT DETECTED
BASE EXCESS BLDA CALC-SCNC: -0.7 MMOL/L (ref 0–2)
BASOPHILS # BLD AUTO: 0.02 10*3/MM3 (ref 0–0.2)
BASOPHILS NFR BLD AUTO: 0.2 % (ref 0–1.5)
BDY SITE: ABNORMAL
BILIRUB SERPL-MCNC: 0.2 MG/DL (ref 0–1.2)
BODY TEMPERATURE: 37
BUN SERPL-MCNC: 32 MG/DL (ref 8–23)
BUN SERPL-MCNC: 33 MG/DL (ref 8–23)
BUN SERPL-MCNC: 33 MG/DL (ref 8–23)
BUN/CREAT SERPL: 20.1 (ref 7–25)
BUN/CREAT SERPL: 20.5 (ref 7–25)
BUN/CREAT SERPL: 20.6 (ref 7–25)
C PNEUM DNA NPH QL NAA+NON-PROBE: NOT DETECTED
CALCIUM SPEC-SCNC: 8.4 MG/DL (ref 8.6–10.5)
CALCIUM SPEC-SCNC: 8.7 MG/DL (ref 8.6–10.5)
CALCIUM SPEC-SCNC: 8.7 MG/DL (ref 8.6–10.5)
CHLORIDE SERPL-SCNC: 107 MMOL/L (ref 98–107)
CHLORIDE SERPL-SCNC: 108 MMOL/L (ref 98–107)
CHLORIDE SERPL-SCNC: 109 MMOL/L (ref 98–107)
CO2 BLDA-SCNC: 26 MMOL/L (ref 22–33)
CO2 SERPL-SCNC: 24 MMOL/L (ref 22–29)
CO2 SERPL-SCNC: 25 MMOL/L (ref 22–29)
CO2 SERPL-SCNC: 25 MMOL/L (ref 22–29)
COHGB MFR BLD: 1.9 % (ref 0–2)
CREAT SERPL-MCNC: 1.56 MG/DL (ref 0.76–1.27)
CREAT SERPL-MCNC: 1.6 MG/DL (ref 0.76–1.27)
CREAT SERPL-MCNC: 1.64 MG/DL (ref 0.76–1.27)
D DIMER PPP FEU-MCNC: 0.83 MCGFEU/ML (ref 0–0.66)
D-LACTATE SERPL-SCNC: 1.6 MMOL/L (ref 0.5–2)
DEPRECATED RDW RBC AUTO: 50.9 FL (ref 37–54)
DEPRECATED RDW RBC AUTO: 53.1 FL (ref 37–54)
EGFRCR SERPLBLD CKD-EPI 2021: 45.8 ML/MIN/1.73
EGFRCR SERPLBLD CKD-EPI 2021: 47.2 ML/MIN/1.73
EGFRCR SERPLBLD CKD-EPI 2021: 48.7 ML/MIN/1.73
EOSINOPHIL # BLD AUTO: 0.15 10*3/MM3 (ref 0–0.4)
EOSINOPHIL NFR BLD AUTO: 1.8 % (ref 0.3–6.2)
EPAP: 0
ERYTHROCYTE [DISTWIDTH] IN BLOOD BY AUTOMATED COUNT: 14.8 % (ref 12.3–15.4)
ERYTHROCYTE [DISTWIDTH] IN BLOOD BY AUTOMATED COUNT: 14.8 % (ref 12.3–15.4)
FERRITIN SERPL-MCNC: 312.8 NG/ML (ref 30–400)
FLUAV SUBTYP SPEC NAA+PROBE: NOT DETECTED
FLUBV RNA ISLT QL NAA+PROBE: NOT DETECTED
GEN 5 2HR TROPONIN T REFLEX: 97 NG/L
GLOBULIN UR ELPH-MCNC: 3.5 GM/DL
GLUCOSE BLDC GLUCOMTR-MCNC: 189 MG/DL (ref 70–130)
GLUCOSE BLDC GLUCOMTR-MCNC: 221 MG/DL (ref 70–130)
GLUCOSE BLDC GLUCOMTR-MCNC: 253 MG/DL (ref 70–130)
GLUCOSE BLDC GLUCOMTR-MCNC: 287 MG/DL (ref 70–130)
GLUCOSE SERPL-MCNC: 186 MG/DL (ref 65–99)
GLUCOSE SERPL-MCNC: 245 MG/DL (ref 65–99)
GLUCOSE SERPL-MCNC: 304 MG/DL (ref 65–99)
HADV DNA SPEC NAA+PROBE: NOT DETECTED
HCO3 BLDA-SCNC: 24.7 MMOL/L (ref 20–26)
HCOV 229E RNA SPEC QL NAA+PROBE: NOT DETECTED
HCOV HKU1 RNA SPEC QL NAA+PROBE: NOT DETECTED
HCOV NL63 RNA SPEC QL NAA+PROBE: NOT DETECTED
HCOV OC43 RNA SPEC QL NAA+PROBE: NOT DETECTED
HCT VFR BLD AUTO: 22.3 % (ref 37.5–51)
HCT VFR BLD AUTO: 25 % (ref 37.5–51)
HCT VFR BLD CALC: 23 % (ref 38–51)
HGB BLD-MCNC: 7.1 G/DL (ref 13–17.7)
HGB BLD-MCNC: 7.6 G/DL (ref 13–17.7)
HGB BLDA-MCNC: 7.5 G/DL (ref 13.5–17.5)
HMPV RNA NPH QL NAA+NON-PROBE: NOT DETECTED
HOLD SPECIMEN: NORMAL
HPIV1 RNA ISLT QL NAA+PROBE: NOT DETECTED
HPIV2 RNA SPEC QL NAA+PROBE: NOT DETECTED
HPIV3 RNA NPH QL NAA+PROBE: NOT DETECTED
HPIV4 P GENE NPH QL NAA+PROBE: NOT DETECTED
IMM GRANULOCYTES # BLD AUTO: 0.03 10*3/MM3 (ref 0–0.05)
IMM GRANULOCYTES NFR BLD AUTO: 0.4 % (ref 0–0.5)
INHALED O2 CONCENTRATION: 45 %
IPAP: 0
IRON 24H UR-MRATE: 17 MCG/DL (ref 59–158)
IRON SATN MFR SERPL: 6 % (ref 20–50)
L PNEUMO1 AG UR QL IA: NEGATIVE
LYMPHOCYTES # BLD AUTO: 0.79 10*3/MM3 (ref 0.7–3.1)
LYMPHOCYTES NFR BLD AUTO: 9.6 % (ref 19.6–45.3)
M PNEUMO IGG SER IA-ACNC: NOT DETECTED
MAGNESIUM SERPL-MCNC: 2.4 MG/DL (ref 1.6–2.4)
MCH RBC QN AUTO: 29.8 PG (ref 26.6–33)
MCH RBC QN AUTO: 30.2 PG (ref 26.6–33)
MCHC RBC AUTO-ENTMCNC: 30.4 G/DL (ref 31.5–35.7)
MCHC RBC AUTO-ENTMCNC: 31.8 G/DL (ref 31.5–35.7)
MCV RBC AUTO: 94.9 FL (ref 79–97)
MCV RBC AUTO: 98 FL (ref 79–97)
METHGB BLD QL: 0.6 % (ref 0–1.5)
MODALITY: ABNORMAL
MONOCYTES # BLD AUTO: 0.85 10*3/MM3 (ref 0.1–0.9)
MONOCYTES NFR BLD AUTO: 10.3 % (ref 5–12)
MRSA DNA SPEC QL NAA+PROBE: ABNORMAL
NEUTROPHILS NFR BLD AUTO: 6.42 10*3/MM3 (ref 1.7–7)
NEUTROPHILS NFR BLD AUTO: 77.7 % (ref 42.7–76)
NRBC BLD AUTO-RTO: 0 /100 WBC (ref 0–0.2)
NT-PROBNP SERPL-MCNC: 905.7 PG/ML (ref 0–900)
OXYHGB MFR BLDV: 94.9 % (ref 94–99)
PAW @ PEAK INSP FLOW SETTING VENT: 0 CMH2O
PCO2 BLDA: 43.2 MM HG (ref 35–45)
PCO2 TEMP ADJ BLD: 43.2 MM HG (ref 35–48)
PH BLDA: 7.37 PH UNITS (ref 7.35–7.45)
PH, TEMP CORRECTED: 7.37 PH UNITS
PLATELET # BLD AUTO: 153 10*3/MM3 (ref 140–450)
PLATELET # BLD AUTO: 155 10*3/MM3 (ref 140–450)
PMV BLD AUTO: 11.1 FL (ref 6–12)
PMV BLD AUTO: 11.4 FL (ref 6–12)
PO2 BLDA: 87.8 MM HG (ref 83–108)
PO2 TEMP ADJ BLD: 87.8 MM HG (ref 83–108)
POTASSIUM SERPL-SCNC: 4.8 MMOL/L (ref 3.5–5.2)
POTASSIUM SERPL-SCNC: 5.1 MMOL/L (ref 3.5–5.2)
POTASSIUM SERPL-SCNC: 5.2 MMOL/L (ref 3.5–5.2)
PROCALCITONIN SERPL-MCNC: 0.26 NG/ML (ref 0–0.25)
PROT SERPL-MCNC: 7 G/DL (ref 6–8.5)
QT INTERVAL: 394 MS
QTC INTERVAL: 403 MS
RBC # BLD AUTO: 2.35 10*6/MM3 (ref 4.14–5.8)
RBC # BLD AUTO: 2.55 10*6/MM3 (ref 4.14–5.8)
RHINOVIRUS RNA SPEC NAA+PROBE: NOT DETECTED
RSV RNA NPH QL NAA+NON-PROBE: NOT DETECTED
S PNEUM AG SPEC QL LA: NEGATIVE
SARS-COV-2 RNA NPH QL NAA+NON-PROBE: NOT DETECTED
SODIUM SERPL-SCNC: 140 MMOL/L (ref 136–145)
SODIUM SERPL-SCNC: 141 MMOL/L (ref 136–145)
SODIUM SERPL-SCNC: 144 MMOL/L (ref 136–145)
TIBC SERPL-MCNC: 289 MCG/DL (ref 298–536)
TOTAL RATE: 0 BREATHS/MINUTE
TRANSFERRIN SERPL-MCNC: 194 MG/DL (ref 200–360)
TROPONIN T DELTA: 14 NG/L
TROPONIN T SERPL HS-MCNC: 73 NG/L
TROPONIN T SERPL HS-MCNC: 83 NG/L
WBC NRBC COR # BLD AUTO: 8.26 10*3/MM3 (ref 3.4–10.8)
WBC NRBC COR # BLD AUTO: 8.26 10*3/MM3 (ref 3.4–10.8)
WHOLE BLOOD HOLD COAG: NORMAL
WHOLE BLOOD HOLD SPECIMEN: NORMAL

## 2024-01-15 PROCEDURE — 85025 COMPLETE CBC W/AUTO DIFF WBC: CPT | Performed by: EMERGENCY MEDICINE

## 2024-01-15 PROCEDURE — 84466 ASSAY OF TRANSFERRIN: CPT | Performed by: STUDENT IN AN ORGANIZED HEALTH CARE EDUCATION/TRAINING PROGRAM

## 2024-01-15 PROCEDURE — 25010000002 HEPARIN (PORCINE) PER 1000 UNITS: Performed by: STUDENT IN AN ORGANIZED HEALTH CARE EDUCATION/TRAINING PROGRAM

## 2024-01-15 PROCEDURE — 0202U NFCT DS 22 TRGT SARS-COV-2: CPT | Performed by: EMERGENCY MEDICINE

## 2024-01-15 PROCEDURE — 93005 ELECTROCARDIOGRAM TRACING: CPT

## 2024-01-15 PROCEDURE — 71045 X-RAY EXAM CHEST 1 VIEW: CPT

## 2024-01-15 PROCEDURE — 85027 COMPLETE CBC AUTOMATED: CPT | Performed by: STUDENT IN AN ORGANIZED HEALTH CARE EDUCATION/TRAINING PROGRAM

## 2024-01-15 PROCEDURE — 99223 1ST HOSP IP/OBS HIGH 75: CPT | Performed by: STUDENT IN AN ORGANIZED HEALTH CARE EDUCATION/TRAINING PROGRAM

## 2024-01-15 PROCEDURE — 87641 MR-STAPH DNA AMP PROBE: CPT | Performed by: STUDENT IN AN ORGANIZED HEALTH CARE EDUCATION/TRAINING PROGRAM

## 2024-01-15 PROCEDURE — 63710000001 INSULIN REGULAR HUMAN PER 5 UNITS: Performed by: STUDENT IN AN ORGANIZED HEALTH CARE EDUCATION/TRAINING PROGRAM

## 2024-01-15 PROCEDURE — 25010000002 VANCOMYCIN 10 G RECONSTITUTED SOLUTION: Performed by: EMERGENCY MEDICINE

## 2024-01-15 PROCEDURE — 84145 PROCALCITONIN (PCT): CPT | Performed by: STUDENT IN AN ORGANIZED HEALTH CARE EDUCATION/TRAINING PROGRAM

## 2024-01-15 PROCEDURE — 25010000002 CALCIUM GLUCONATE 2-0.675 GM/100ML-% SOLUTION: Performed by: HOSPITALIST

## 2024-01-15 PROCEDURE — 93005 ELECTROCARDIOGRAM TRACING: CPT | Performed by: HOSPITALIST

## 2024-01-15 PROCEDURE — 25010000002 CEFEPIME PER 500 MG: Performed by: STUDENT IN AN ORGANIZED HEALTH CARE EDUCATION/TRAINING PROGRAM

## 2024-01-15 PROCEDURE — 94799 UNLISTED PULMONARY SVC/PX: CPT

## 2024-01-15 PROCEDURE — 80053 COMPREHEN METABOLIC PANEL: CPT | Performed by: EMERGENCY MEDICINE

## 2024-01-15 PROCEDURE — 83050 HGB METHEMOGLOBIN QUAN: CPT

## 2024-01-15 PROCEDURE — 82805 BLOOD GASES W/O2 SATURATION: CPT

## 2024-01-15 PROCEDURE — 87040 BLOOD CULTURE FOR BACTERIA: CPT | Performed by: EMERGENCY MEDICINE

## 2024-01-15 PROCEDURE — 83880 ASSAY OF NATRIURETIC PEPTIDE: CPT | Performed by: EMERGENCY MEDICINE

## 2024-01-15 PROCEDURE — 84484 ASSAY OF TROPONIN QUANT: CPT | Performed by: STUDENT IN AN ORGANIZED HEALTH CARE EDUCATION/TRAINING PROGRAM

## 2024-01-15 PROCEDURE — 36415 COLL VENOUS BLD VENIPUNCTURE: CPT

## 2024-01-15 PROCEDURE — 25010000002 FUROSEMIDE PER 20 MG: Performed by: STUDENT IN AN ORGANIZED HEALTH CARE EDUCATION/TRAINING PROGRAM

## 2024-01-15 PROCEDURE — 25010000002 PIPERACILLIN SOD-TAZOBACTAM PER 1 G: Performed by: EMERGENCY MEDICINE

## 2024-01-15 PROCEDURE — 85379 FIBRIN DEGRADATION QUANT: CPT | Performed by: EMERGENCY MEDICINE

## 2024-01-15 PROCEDURE — 25810000003 SODIUM CHLORIDE 0.9 % SOLUTION: Performed by: EMERGENCY MEDICINE

## 2024-01-15 PROCEDURE — 99291 CRITICAL CARE FIRST HOUR: CPT

## 2024-01-15 PROCEDURE — 82375 ASSAY CARBOXYHB QUANT: CPT

## 2024-01-15 PROCEDURE — 84484 ASSAY OF TROPONIN QUANT: CPT | Performed by: EMERGENCY MEDICINE

## 2024-01-15 PROCEDURE — 93005 ELECTROCARDIOGRAM TRACING: CPT | Performed by: EMERGENCY MEDICINE

## 2024-01-15 PROCEDURE — 82728 ASSAY OF FERRITIN: CPT | Performed by: STUDENT IN AN ORGANIZED HEALTH CARE EDUCATION/TRAINING PROGRAM

## 2024-01-15 PROCEDURE — 82948 REAGENT STRIP/BLOOD GLUCOSE: CPT

## 2024-01-15 PROCEDURE — 93010 ELECTROCARDIOGRAM REPORT: CPT | Performed by: INTERNAL MEDICINE

## 2024-01-15 PROCEDURE — 87449 NOS EACH ORGANISM AG IA: CPT | Performed by: STUDENT IN AN ORGANIZED HEALTH CARE EDUCATION/TRAINING PROGRAM

## 2024-01-15 PROCEDURE — 83540 ASSAY OF IRON: CPT | Performed by: STUDENT IN AN ORGANIZED HEALTH CARE EDUCATION/TRAINING PROGRAM

## 2024-01-15 PROCEDURE — 83605 ASSAY OF LACTIC ACID: CPT | Performed by: EMERGENCY MEDICINE

## 2024-01-15 PROCEDURE — 83735 ASSAY OF MAGNESIUM: CPT | Performed by: STUDENT IN AN ORGANIZED HEALTH CARE EDUCATION/TRAINING PROGRAM

## 2024-01-15 PROCEDURE — 36600 WITHDRAWAL OF ARTERIAL BLOOD: CPT

## 2024-01-15 RX ORDER — CHOLECALCIFEROL (VITAMIN D3) 125 MCG
5 CAPSULE ORAL NIGHTLY
Status: DISCONTINUED | OUTPATIENT
Start: 2024-01-15 | End: 2024-01-21 | Stop reason: HOSPADM

## 2024-01-15 RX ORDER — CLONIDINE HYDROCHLORIDE 0.1 MG/1
0.1 TABLET ORAL EVERY 6 HOURS PRN
Status: ON HOLD | COMMUNITY

## 2024-01-15 RX ORDER — VANCOMYCIN HYDROCHLORIDE 1 G/200ML
10.7 INJECTION, SOLUTION INTRAVENOUS
Status: DISCONTINUED | OUTPATIENT
Start: 2024-01-16 | End: 2024-01-16

## 2024-01-15 RX ORDER — DEXTROSE MONOHYDRATE 25 G/50ML
25 INJECTION, SOLUTION INTRAVENOUS
Status: DISCONTINUED | OUTPATIENT
Start: 2024-01-15 | End: 2024-01-18

## 2024-01-15 RX ORDER — AMOXICILLIN 250 MG
2 CAPSULE ORAL 2 TIMES DAILY
Status: DISCONTINUED | OUTPATIENT
Start: 2024-01-15 | End: 2024-01-21 | Stop reason: HOSPADM

## 2024-01-15 RX ORDER — CARVEDILOL 12.5 MG/1
25 TABLET ORAL 2 TIMES DAILY WITH MEALS
Status: DISCONTINUED | OUTPATIENT
Start: 2024-01-15 | End: 2024-01-21 | Stop reason: HOSPADM

## 2024-01-15 RX ORDER — FUROSEMIDE 10 MG/ML
80 INJECTION INTRAMUSCULAR; INTRAVENOUS 2 TIMES DAILY
Status: DISCONTINUED | OUTPATIENT
Start: 2024-01-15 | End: 2024-01-17

## 2024-01-15 RX ORDER — TRAZODONE HYDROCHLORIDE 50 MG/1
25 TABLET ORAL NIGHTLY
COMMUNITY
End: 2024-01-21 | Stop reason: HOSPADM

## 2024-01-15 RX ORDER — BISACODYL 10 MG
10 SUPPOSITORY, RECTAL RECTAL DAILY PRN
Status: DISCONTINUED | OUTPATIENT
Start: 2024-01-15 | End: 2024-01-21 | Stop reason: HOSPADM

## 2024-01-15 RX ORDER — CALCIUM GLUCONATE 20 MG/ML
2000 INJECTION, SOLUTION INTRAVENOUS ONCE
Status: DISCONTINUED | OUTPATIENT
Start: 2024-01-15 | End: 2024-01-18

## 2024-01-15 RX ORDER — HEPARIN SODIUM 5000 [USP'U]/ML
5000 INJECTION, SOLUTION INTRAVENOUS; SUBCUTANEOUS EVERY 8 HOURS SCHEDULED
Status: DISCONTINUED | OUTPATIENT
Start: 2024-01-15 | End: 2024-01-21 | Stop reason: HOSPADM

## 2024-01-15 RX ORDER — BISACODYL 5 MG/1
5 TABLET, DELAYED RELEASE ORAL DAILY PRN
Status: DISCONTINUED | OUTPATIENT
Start: 2024-01-15 | End: 2024-01-21 | Stop reason: HOSPADM

## 2024-01-15 RX ORDER — TIMOLOL MALEATE 5 MG/ML
1 SOLUTION/ DROPS OPHTHALMIC 2 TIMES DAILY
Status: DISCONTINUED | OUTPATIENT
Start: 2024-01-15 | End: 2024-01-21 | Stop reason: HOSPADM

## 2024-01-15 RX ORDER — TORSEMIDE 10 MG/1
10 TABLET ORAL DAILY
Status: ON HOLD | COMMUNITY

## 2024-01-15 RX ORDER — POLYETHYLENE GLYCOL 3350 17 G/17G
17 POWDER, FOR SOLUTION ORAL DAILY PRN
Status: DISCONTINUED | OUTPATIENT
Start: 2024-01-15 | End: 2024-01-21 | Stop reason: HOSPADM

## 2024-01-15 RX ORDER — ALBUTEROL SULFATE 2.5 MG/3ML
2.5 SOLUTION RESPIRATORY (INHALATION) EVERY 4 HOURS PRN
Status: DISCONTINUED | OUTPATIENT
Start: 2024-01-15 | End: 2024-01-21 | Stop reason: HOSPADM

## 2024-01-15 RX ORDER — IBUPROFEN 600 MG/1
1 TABLET ORAL
Status: DISCONTINUED | OUTPATIENT
Start: 2024-01-15 | End: 2024-01-18

## 2024-01-15 RX ORDER — DIVALPROEX SODIUM 125 MG/1
375 TABLET, DELAYED RELEASE ORAL 2 TIMES DAILY
Status: DISCONTINUED | OUTPATIENT
Start: 2024-01-15 | End: 2024-01-21 | Stop reason: HOSPADM

## 2024-01-15 RX ORDER — NICOTINE POLACRILEX 4 MG
15 LOZENGE BUCCAL
Status: DISCONTINUED | OUTPATIENT
Start: 2024-01-15 | End: 2024-01-18

## 2024-01-15 RX ORDER — FLUOXETINE HYDROCHLORIDE 20 MG/1
20 CAPSULE ORAL 2 TIMES DAILY
Status: ON HOLD | COMMUNITY
End: 2024-01-21 | Stop reason: SDUPTHER

## 2024-01-15 RX ORDER — TRAZODONE HYDROCHLORIDE 50 MG/1
25 TABLET ORAL NIGHTLY
Status: DISCONTINUED | OUTPATIENT
Start: 2024-01-15 | End: 2024-01-21 | Stop reason: HOSPADM

## 2024-01-15 RX ORDER — ACETAMINOPHEN 325 MG/1
650 TABLET ORAL EVERY 6 HOURS PRN
Status: DISCONTINUED | OUTPATIENT
Start: 2024-01-15 | End: 2024-01-21 | Stop reason: HOSPADM

## 2024-01-15 RX ORDER — DOCUSATE SODIUM 100 MG/1
100 CAPSULE, LIQUID FILLED ORAL 2 TIMES DAILY PRN
Status: ON HOLD | COMMUNITY

## 2024-01-15 RX ORDER — SODIUM CHLORIDE 0.9 % (FLUSH) 0.9 %
10 SYRINGE (ML) INJECTION EVERY 12 HOURS SCHEDULED
Status: DISCONTINUED | OUTPATIENT
Start: 2024-01-15 | End: 2024-01-21 | Stop reason: HOSPADM

## 2024-01-15 RX ORDER — CARVEDILOL 25 MG/1
25 TABLET ORAL 2 TIMES DAILY WITH MEALS
Status: ON HOLD | COMMUNITY

## 2024-01-15 RX ORDER — AMLODIPINE BESYLATE 5 MG/1
5 TABLET ORAL
Status: DISCONTINUED | OUTPATIENT
Start: 2024-01-15 | End: 2024-01-15

## 2024-01-15 RX ORDER — ATORVASTATIN CALCIUM 20 MG/1
20 TABLET, FILM COATED ORAL NIGHTLY
Status: ON HOLD | COMMUNITY

## 2024-01-15 RX ORDER — FAMOTIDINE 20 MG/1
20 TABLET, FILM COATED ORAL 2 TIMES DAILY
Status: DISCONTINUED | OUTPATIENT
Start: 2024-01-15 | End: 2024-01-17

## 2024-01-15 RX ORDER — SODIUM CHLORIDE 0.9 % (FLUSH) 0.9 %
10 SYRINGE (ML) INJECTION AS NEEDED
Status: DISCONTINUED | OUTPATIENT
Start: 2024-01-15 | End: 2024-01-21 | Stop reason: HOSPADM

## 2024-01-15 RX ORDER — KETOROLAC TROMETHAMINE 5 MG/ML
1 SOLUTION OPHTHALMIC
COMMUNITY
End: 2024-01-21 | Stop reason: HOSPADM

## 2024-01-15 RX ORDER — HYDROXYZINE HYDROCHLORIDE 25 MG/1
50 TABLET, FILM COATED ORAL EVERY 6 HOURS PRN
Status: DISCONTINUED | OUTPATIENT
Start: 2024-01-15 | End: 2024-01-21 | Stop reason: HOSPADM

## 2024-01-15 RX ORDER — BRIMONIDINE TARTRATE 2 MG/ML
1 SOLUTION/ DROPS OPHTHALMIC 2 TIMES DAILY
Status: DISCONTINUED | OUTPATIENT
Start: 2024-01-15 | End: 2024-01-21 | Stop reason: HOSPADM

## 2024-01-15 RX ORDER — VANCOMYCIN HYDROCHLORIDE 1 G/200ML
10.7 INJECTION, SOLUTION INTRAVENOUS
Status: DISCONTINUED | OUTPATIENT
Start: 2024-01-16 | End: 2024-01-15

## 2024-01-15 RX ORDER — ECHINACEA PURPUREA EXTRACT 125 MG
1 TABLET ORAL AS NEEDED
Status: DISCONTINUED | OUTPATIENT
Start: 2024-01-15 | End: 2024-01-21 | Stop reason: HOSPADM

## 2024-01-15 RX ORDER — FUROSEMIDE 10 MG/ML
80 INJECTION INTRAMUSCULAR; INTRAVENOUS ONCE
Status: COMPLETED | OUTPATIENT
Start: 2024-01-15 | End: 2024-01-15

## 2024-01-15 RX ORDER — MELATONIN
6000 DAILY
Status: DISCONTINUED | OUTPATIENT
Start: 2024-01-15 | End: 2024-01-21 | Stop reason: HOSPADM

## 2024-01-15 RX ORDER — FERROUS SULFATE 325(65) MG
325 TABLET ORAL
Status: DISCONTINUED | OUTPATIENT
Start: 2024-01-15 | End: 2024-01-21 | Stop reason: HOSPADM

## 2024-01-15 RX ORDER — SODIUM CHLORIDE 9 MG/ML
40 INJECTION, SOLUTION INTRAVENOUS AS NEEDED
Status: DISCONTINUED | OUTPATIENT
Start: 2024-01-15 | End: 2024-01-21 | Stop reason: HOSPADM

## 2024-01-15 RX ADMIN — FAMOTIDINE 20 MG: 20 TABLET, FILM COATED ORAL at 21:15

## 2024-01-15 RX ADMIN — FAMOTIDINE 20 MG: 20 TABLET, FILM COATED ORAL at 10:28

## 2024-01-15 RX ADMIN — Medication 6000 UNITS: at 10:29

## 2024-01-15 RX ADMIN — FERROUS SULFATE TAB 325 MG (65 MG ELEMENTAL FE) 325 MG: 325 (65 FE) TAB at 10:30

## 2024-01-15 RX ADMIN — CARVEDILOL 25 MG: 12.5 TABLET, FILM COATED ORAL at 17:03

## 2024-01-15 RX ADMIN — CEFEPIME 2000 MG: 2 INJECTION, POWDER, FOR SOLUTION INTRAVENOUS at 10:31

## 2024-01-15 RX ADMIN — FUROSEMIDE 80 MG: 10 INJECTION, SOLUTION INTRAMUSCULAR; INTRAVENOUS at 04:36

## 2024-01-15 RX ADMIN — Medication 5 MG: at 21:13

## 2024-01-15 RX ADMIN — TRAZODONE HYDROCHLORIDE 25 MG: 50 TABLET ORAL at 21:14

## 2024-01-15 RX ADMIN — DOXYCYCLINE 100 MG: 100 INJECTION, POWDER, LYOPHILIZED, FOR SOLUTION INTRAVENOUS at 06:53

## 2024-01-15 RX ADMIN — Medication 10 ML: at 21:15

## 2024-01-15 RX ADMIN — INSULIN HUMAN 2 UNITS: 100 INJECTION, SOLUTION PARENTERAL at 11:37

## 2024-01-15 RX ADMIN — SODIUM ZIRCONIUM CYCLOSILICATE 10 G: 10 POWDER, FOR SUSPENSION ORAL at 07:49

## 2024-01-15 RX ADMIN — HEPARIN SODIUM 5000 UNITS: 5000 INJECTION INTRAVENOUS; SUBCUTANEOUS at 06:18

## 2024-01-15 RX ADMIN — BRIMONIDINE TARTRATE 1 DROP: 2 SOLUTION OPHTHALMIC at 21:19

## 2024-01-15 RX ADMIN — HEPARIN SODIUM 5000 UNITS: 5000 INJECTION INTRAVENOUS; SUBCUTANEOUS at 21:15

## 2024-01-15 RX ADMIN — DIVALPROEX SODIUM 375 MG: 125 TABLET, DELAYED RELEASE ORAL at 11:38

## 2024-01-15 RX ADMIN — PIPERACILLIN SODIUM AND TAZOBACTAM SODIUM 4.5 G: 4; .5 INJECTION, SOLUTION INTRAVENOUS at 03:41

## 2024-01-15 RX ADMIN — FUROSEMIDE 80 MG: 10 INJECTION, SOLUTION INTRAMUSCULAR; INTRAVENOUS at 17:03

## 2024-01-15 RX ADMIN — INSULIN HUMAN 4 UNITS: 100 INJECTION, SOLUTION PARENTERAL at 17:03

## 2024-01-15 RX ADMIN — THIAMINE HCL TAB 100 MG 100 MG: 100 TAB at 10:28

## 2024-01-15 RX ADMIN — VANCOMYCIN HYDROCHLORIDE 1750 MG: 10 INJECTION, POWDER, LYOPHILIZED, FOR SOLUTION INTRAVENOUS at 04:40

## 2024-01-15 RX ADMIN — TIMOLOL MALEATE 1 DROP: 5 SOLUTION/ DROPS OPHTHALMIC at 21:19

## 2024-01-15 RX ADMIN — Medication 10 ML: at 10:32

## 2024-01-15 RX ADMIN — HEPARIN SODIUM 5000 UNITS: 5000 INJECTION INTRAVENOUS; SUBCUTANEOUS at 15:48

## 2024-01-15 RX ADMIN — THIAMINE HCL TAB 100 MG 100 MG: 100 TAB at 15:48

## 2024-01-15 RX ADMIN — DIVALPROEX SODIUM 375 MG: 125 TABLET, DELAYED RELEASE ORAL at 21:19

## 2024-01-15 RX ADMIN — INSULIN HUMAN 4 UNITS: 100 INJECTION, SOLUTION PARENTERAL at 06:27

## 2024-01-15 RX ADMIN — TIMOLOL MALEATE 1 DROP: 5 SOLUTION/ DROPS OPHTHALMIC at 11:57

## 2024-01-15 RX ADMIN — SENNOSIDES AND DOCUSATE SODIUM 2 TABLET: 8.6; 5 TABLET ORAL at 10:31

## 2024-01-15 RX ADMIN — CARVEDILOL 25 MG: 12.5 TABLET, FILM COATED ORAL at 07:49

## 2024-01-15 RX ADMIN — THIAMINE HCL TAB 100 MG 100 MG: 100 TAB at 21:13

## 2024-01-15 RX ADMIN — BRIMONIDINE TARTRATE 1 DROP: 2 SOLUTION OPHTHALMIC at 11:39

## 2024-01-15 NOTE — LETTER
EMS Transport Request  For use at [facility], [place], [place], [place], and [place] only   Patient Name: Omkar Nava : 1955   Weight:85.1 kg (187 lb 11.2 oz) Pick-up Location: [facility] BLS/ALS: BLS/ALS: BLS   Insurance: MEDICARE Auth End Date:    Pre-Cert #: D/C Summary complete:    Destination: Other [facility]    Contact Precautions: None   Equipment (O2, Fluids, etc.): O2, settings 2L   Arrive By Date/Time:  Stretcher/WC: Stretcher   CM Requesting: Edie Ochoa RN Ext: [phone]   Notes/Medical Necessity:      ______________________________________________________________________    *Only 2 patient bags OR 1 carry-on size bag are permitted.  Wheelchairs and walkers CANNOT transported with the patient. Acknowledge: Yes

## 2024-01-15 NOTE — LETTER
EMS Transport Request  For use at [facility], [place], [place], [place], and [place] only   Patient Name: Omkar Nava : 1955   Weight:85.5 kg (188 lb 9.6 oz) Pick-up Location: [facility] BLS/ALS: BLS/ALS: BLS   Insurance: MEDICARE Auth End Date:    Pre-Cert #: D/C Summary complete:    Destination: [facility]   Contact Precautions: None   Equipment (O2, Fluids, etc.): 2L NC O2   Arrive By Date/Time:2024 Stretcher/WC: Stretcher   CM Requesting: Rosemarie Yarbrough RN Ext: [phone]   Notes/Medical Necessity:Confused, Dependent     ______________________________________________________________________    *Only 2 patient bags OR 1 carry-on size bag are permitted.  Wheelchairs and walkers CANNOT transported with the patient. Acknowledge: Yes

## 2024-01-15 NOTE — H&P
[facility] Medicine Services  HISTORY AND PHYSICAL    Patient Name: Omkar Nava  : 1955  MRN: [id number]  Primary Care Physician: Deann Cao MD  Date of admission: 1/15/2024      Subjective   Subjective     Chief Complaint:  Shortness of breath    HPI:  Omkar Nava is a 68 y.o. male with past medical history of chronic systolic heart failure, type 2 diabetes on insulin, chronic anemia, hypertension, CKD 3, history of osteomyelitis status post left TMA, dementia who currently resides at [facility] who presents with hypoxia.  He wears 2 L of oxygen at the [facility] nursing [facility], which I think is relatively recent since he was recovering from influenza back in December.  Today he was found to have a saturation of 72% on his 2 L of oxygen and he appeared to be more labored in his breathing.  He was sent over to the ED.  He was of afebrile, not tachycardic.  Patient is able to give somewhat of a history and states that his work of breathing is getting better since he was placed on high flow nasal cannula.  He does not know any chest pain, palpitations, abdominal pain.  He states he is not coughing.  No fevers or chills.  States there are multiple other people sick at the nursing facility with cough and congestion right now.  Does report orthopnea and increased leg swelling.      Personal History     Past Medical History:   Diagnosis Date    Anemia     Dementia     Diabetes mellitus     Dysphagia     GERD (gastroesophageal reflux disease)     History of alcohol abuse     History of cocaine use     History of marijuana use     Hypertension     Osteomyelitis     Poor historian     records obtained from nursing home records & his family    Visual impairment            Past Surgical History:   Procedure Laterality Date    AMPUTATION FOOT Left 10/18/2022    Procedure: PARTIAL FIRST RAY AMPUTATION LEFT;  Surgeon: Yeison Petty MD;  Location:   "[facility];  Service: Orthopedics;  Laterality: Left;    AMPUTATION FOOT Left 12/5/2022    Procedure: Transmetatarsal of Left Foot;  Surgeon: Yeison Petty MD;  Location: [facility] OR;  Service: Orthopedics;  Laterality: Left;    EYE SURGERY         Family History: family history includes Diabetes in his brother, brother, father, maternal grandfather, maternal grandmother, mother, and sister; Hypertension in his brother, brother, father, and mother.     Social History:  reports that he quit smoking about 6 years ago. His smoking use included cigarettes. He has a 40.00 pack-year smoking history. He has never used smokeless tobacco. He reports that he does not currently use alcohol. He reports current drug use. Drugs: Marijuana and \"Crack\" cocaine.  Social History     Social History Narrative    Caffeine 0-1 servings per day    Patient lives at home .       Medications:  Available home medication information reviewed.  Cholecalciferol, Glucagon HCl, Insulin Lispro, Nepafenac, acetaminophen, albuterol sulfate HFA, amLODIPine, apixaban, ascorbic acid, brimonidine-timolol, difluprednate, divalproex, famotidine, ferrous sulfate, hydrALAZINE, hydrOXYzine, insulin glargine, lisinopril, melatonin, and thiamine    No Known Allergies    Objective   Objective     Vital Signs:   Temp:  [98 °F (36.7 °C)] 98 °F (36.7 °C)  Heart Rate:  [69-74] 72  Resp:  [20] 20  BP: (133-167)/(62-86) 153/73  Flow (L/min):  [40] 40       Physical Exam   He is awake alert oriented to person and place  Resting comfortably in bed, speaking in full sentences on high flow nasal cannula without accessory muscle usage  Mucous membranes are moist  JVP 12 cm  Heart regular rate and rhythm  Lungs with bilateral rales lower one third lung fields  Abdomen mildly distended, nontender  2+ pitting lower extremity edema bilaterally  Capillary refill less than 2 seconds  Extremities are warm dry and well-perfused    Bedside ultrasound demonstrates B-lines present " bilateral lung fields.  Lung sliding appropriate, no effusions noted    Result Review:  I have personally reviewed the results from the time of this admission to 1/15/2024 04:02 EST and agree with these findings:  [x]  Laboratory list / accordion  []  Microbiology  [x]  Radiology  [x]  EKG/Telemetry   []  Cardiology/Vascular   []  Pathology  [x]  Old records  []  Other:  Most notable findings include: See assessment and plan      LAB RESULTS:      Lab 01/15/24  0118   WBC 8.26   HEMOGLOBIN 7.6*   HEMATOCRIT 25.0*   PLATELETS 155   NEUTROS ABS 6.42   IMMATURE GRANS (ABS) 0.03   LYMPHS ABS 0.79   MONOS ABS 0.85   EOS ABS 0.15   MCV 98.0*   LACTATE 1.6   D DIMER QUANT 0.83*         Lab 01/15/24  0118   SODIUM 141   POTASSIUM 5.2   CHLORIDE 108*   CO2 24.0   ANION GAP 9.0   BUN 33*   CREATININE 1.64*   EGFR 45.8*   GLUCOSE 304*   CALCIUM 8.7         Lab 01/15/24  0118   TOTAL PROTEIN 7.0   ALBUMIN 3.5   GLOBULIN 3.5   ALT (SGPT) 47*   AST (SGOT) 32   BILIRUBIN 0.2   ALK PHOS 110         Lab 01/15/24  0118   PROBNP 905.7*   HSTROP T 83*                 Lab 01/15/24  0204   PH, ARTERIAL 7.366   PCO2, ARTERIAL 43.2   PO2 ART 87.8   FIO2 45   HCO3 ART 24.7   BASE EXCESS ART -0.7*   CARBOXYHEMOGLOBIN 1.9     UA          7/31/2023    12:30 8/2/2023    13:19   Urinalysis   Squamous Epithelial Cells, UA 0-2  None Seen    Specific [place], UA 1.015  1.020    Ketones, UA Negative  Negative    Blood, UA Small (1+)  Negative    Leukocytes, UA Negative  Negative    Nitrite, UA Negative  Negative    RBC, UA Too Numerous to Count  3-6    WBC, UA 0-2  0-2    Bacteria, UA None Seen  None Seen        Microbiology Results (last 10 days)       Procedure Component Value - Date/Time    Respiratory Panel PCR w/COVID-19(SARS-CoV-2) [facility] In-House, NP Swab in UTM/VTM, 2 HR TAT - Swab, Nasopharynx [[id number]]  (Normal) Collected: 01/15/24 0115    Lab Status: Final result Specimen: Swab from Nasopharynx Updated: 01/15/24 8590      ADENOVIRUS, PCR Not Detected     Coronavirus 229E Not Detected     Coronavirus HKU1 Not Detected     Coronavirus NL63 Not Detected     Coronavirus OC43 Not Detected     COVID19 Not Detected     Human Metapneumovirus Not Detected     Human Rhinovirus/Enterovirus Not Detected     Influenza A PCR Not Detected     Influenza B PCR Not Detected     Parainfluenza Virus 1 Not Detected     Parainfluenza Virus 2 Not Detected     Parainfluenza Virus 3 Not Detected     Parainfluenza Virus 4 Not Detected     RSV, PCR Not Detected     Bordetella pertussis pcr Not Detected     Bordetella parapertussis PCR Not Detected     Chlamydophila pneumoniae PCR Not Detected     Mycoplasma pneumo by PCR Not Detected    Narrative:      In the setting of a positive respiratory panel with a viral infection PLUS a negative procalcitonin without other underlying concern for bacterial infection, consider observing off antibiotics or discontinuation of antibiotics and continue supportive care. If the respiratory panel is positive for atypical bacterial infection (Bordetella pertussis, Chlamydophila pneumoniae, or Mycoplasma pneumoniae), consider antibiotic de-escalation to target atypical bacterial infection.            XR Chest 1 View    Result Date: 1/15/2024  XR CHEST 1 VW Date of Exam: 1/15/2024 2:05 AM EST Indication: SOA triage protocol Comparison: Chest radiograph July 31, 2023 Findings: The heart size normal. There are diffuse bilateral alveolar and interstitial airspace opacities greater on the right than the left. Findings are most likely secondary to multifocal pneumonia. Differential would include asymmetric pulmonary edema.     Impression: Impression: Multifocal alveolar and interstitial opacities favors pneumonia. Electronically Signed: Boby Riggs MD  1/15/2024 2:36 AM EST  Workstation ID: [id number]         Assessment & Plan   Assessment & Plan       Hypoxia    Type 2 diabetes mellitus    Essential hypertension    Psychotic  disorder    Neurocognitive disorder    S/P transmetatarsal amputation of foot, left    Anemia of chronic disease    Aspiration pneumonia    Dementia    Personal history of Methicillin resistant Staphylococcus aureus infection    Type 2 diabetes mellitus with diabetic neuropathy, unspecified    Type 2 diabetes mellitus with diabetic chronic kidney disease      Acute on chronic hypoxemic respiratory failure secondary to,  Multifocal pneumonia with risk factors for healthcare associated infection  Acute on chronic exacerbation of systolic heart failure  -Has been on 2 L oxygen at [facility] nursing [facility], he is now requiring 40 L/min high flow nasal cannula 45% FiO2.  Arterial blood gas shows pH 7.366, pCO2 43, pO2 87.  Respiratory viral panel is negative.  White blood count is normal.  Procalcitonin is pending.  Lactic acid is normal.  BNP is elevated to less than prior.  D-dimer very mildly elevated.  Chest x-ray shows multifocal opacities greater on right than left concerning for multifocal pneumonia versus asymmetric pulmonary edema.  Bedside ultrasound demonstrates diffuse B-lines throughout both lung fields.  Plan:  -Given above findings we will hold off on CTA chest for now given other potential explanations and very minimal elevation in D-dimer  -Cover broadly with antibiotics for now to treat for bacterial pneumonia.  Vancomycin, cefepime, doxycycline.  Check urine antigens, sputum culture, MRSA nares, blood cultures and de-escalate as appropriate.  -Hold home torsemide and switch to 80 mg Lasix IV twice daily, begin heart failure pathway.    -Echocardiogram ordered and pending as there are none in our system  -Continue home carvedilol.  He no longer appears to be taking SGLT2 or ACE inhibitor.  Will see where creatinine settles out, if improving can optimize guideline directed medical therapy    Acute kidney injury on CKD 3  -Baseline creatinine appears to be 1.3, he is 1.64 on admission.  Suspect prerenal  secondary to fluid retention.  Will check bladder scan to ensure he is not retaining.  Will need to watch renal function closely while receiving diuretics    Elevated serum troponin level  -Troponin 83 on admission without ischemic changes on EKG or complaints of chest pain.  Will follow-up delta troponin.  Suspect demand ischemia secondary to lung pathology and worsened by CKD    Anemia of chronic disease  -Baseline hemoglobin around 8, he is 7.6 on admission.  No signs of active bleeding.  Will check iron studies and continue to monitor.  Continue chronic vitamin and iron supplementation.  Consider IV iron prior to discharge if appropriate    Hypertension  -Holding home blood pressure medications for now to allow more room for diuresis and adjustment of guideline directed medical therapy    Type 2 diabetes  -Uses 6 units of insulin glargine at skilled nursing facility.  As he is n.p.o., continue with corrective factor only here    History of dementia with behavioral disturbance  Insomnia  -Continue home medications      DVT prophylaxis: Subcutaneous heparin  Medical DVT prophylaxis orders are present.      CODE STATUS:    Code Status and Medical Interventions:   Ordered at: 01/15/24 0333     Code Status (Patient has no pulse and is not breathing):    CPR (Attempt to Resuscitate)     Medical Interventions (Patient has pulse or is breathing):    Full Support       Expected Discharge pending clinical course  Expected Discharge Date: 1/17/2024; Expected Discharge Time:      Alberto Oh MD  01/15/24

## 2024-01-15 NOTE — ED PROVIDER NOTES
Subjective   History of Present Illness  68-year-old male with history of diabetes mellitus, heart failure, and dementia presents to the emergency department brought by EMS from [facility] with concerns about difficulty breathing and low oxygen saturation.  Per EMS report, the patient was started on supplemental oxygen at 2 L/min a couple of days ago.  This evening, the patient was found to be hypoxic at 71% on 2 L/min by nasal cannula.  He recently had influenza in late December 2023.  EMS reports the patient was hyperglycemic with blood glucose in the 400s prior to arrival.  Staff at the [facility] reports that the patient is at his baseline mental status.  The patient is a poor historian but states that he is hurting all over.      Review of Systems   Unable to perform ROS: Dementia   Constitutional:  Negative for diaphoresis and fever.   HENT:  Negative for facial swelling.    Eyes:  Negative for photophobia and discharge.   Respiratory:  Negative for stridor.        Past Medical History:   Diagnosis Date    Anemia     Dementia     Diabetes mellitus     Dysphagia     GERD (gastroesophageal reflux disease)     History of alcohol abuse     History of cocaine use     History of marijuana use     Hypertension     Osteomyelitis     Poor historian     records obtained from nursing home records & his family    Visual impairment        No Known Allergies    Past Surgical History:   Procedure Laterality Date    AMPUTATION FOOT Left 10/18/2022    Procedure: PARTIAL FIRST RAY AMPUTATION LEFT;  Surgeon: Yeison Petty MD;  Location: [facility] OR;  Service: Orthopedics;  Laterality: Left;    AMPUTATION FOOT Left 12/5/2022    Procedure: Transmetatarsal of Left Foot;  Surgeon: Yeison Petty MD;  Location: [facility] OR;  Service: Orthopedics;  Laterality: Left;    EYE SURGERY         Family History   Problem Relation Age of Onset    Diabetes Mother     Hypertension Mother     Hypertension  "Father     Diabetes Father     Diabetes Sister     Hypertension Brother     Diabetes Brother     Diabetes Maternal Grandmother     Diabetes Maternal Grandfather     Hypertension Brother     Diabetes Brother        Social History     Socioeconomic History    Marital status: Single   Tobacco Use    Smoking status: Former     Packs/day: 1.00     Years: 40.00     Additional pack years: 0.00     Total pack years: 40.00     Types: Cigarettes     Quit date: 2017     Years since quittin.6    Smokeless tobacco: Never   Substance and Sexual Activity    Alcohol use: Not Currently     Comment: histgry of alcohol abuse    Drug use: Yes     Types: Marijuana, \"Crack\" cocaine     Comment: PATIENT STATES \"IT'S BEEN A FEW DAYS\"    Sexual activity: Defer           Objective   Physical Exam  Vitals and nursing note reviewed.   Constitutional:       Comments: Poor historian.  Follows commands.  BMI 40.   Neck:      Vascular: JVD present.   Cardiovascular:      Rate and Rhythm: Normal rate and regular rhythm.      Heart sounds: No murmur heard.     No friction rub. No gallop.      Comments: S1, S2  Pulmonary:      Breath sounds: No stridor.      Comments: Decreased air entry bilaterally.  Mild prolongation of expiratory phase.  I do not appreciate any wheezing or rales.  Musculoskeletal:      Cervical back: Neck supple.      Comments: 1+ pitting edema bilateral lower extremities, symmetric without signs of cellulitis.  Status post left foot transmetatarsal amputation.  No calf tenderness bilaterally.     Skin:     General: Skin is warm and dry.   Neurological:      Mental Status: He is alert.      Comments: Moves all extremities.  No dysarthria.         Procedures           ED Course  ED Course as of 01/15/24 0252   Mon Antonino 15, 2024   0250 proBNP(!): 905.7  Lower than prior values [LD]      ED Course User Index  [LD] Farhana Valerio MD                                   Mild elevation in d-dimer with elevation in creatinine, " defer to hospitalist service decision about CTA vs hydration and CTA and/or VQ scan at a later time.          Medical Decision Making  Differential diagnosis includes secondary bacterial pneumonia as a complication of recent influenza, CHF exacerbation with pulmonary edema, less likely pulmonary embolus, and others.    Problems Addressed:  Acute respiratory failure with hypoxia: complicated acute illness or injury  History of dementia: complicated acute illness or injury  Multifocal pneumonia: complicated acute illness or injury    Amount and/or Complexity of Data Reviewed  Independent Historian: EMS  External Data Reviewed:      Details: I reviewed records from [facility].  CODE STATUS is full code.  Labs: ordered. Decision-making details documented in ED Course.  Radiology: ordered. Decision-making details documented in ED Course.  ECG/medicine tests: ordered and independent interpretation performed. Decision-making details documented in ED Course.     Details: EKG at 0108 shows normal sinus rhythm at a rate of 71 bpm, normal intervals, no acute ischemic changes.  Discussion of management or test interpretation with external provider(s): Case discussed with hospitalist Dr. Oh by epic chat and in person.    Risk  Prescription drug management.  Decision regarding hospitalization.    Critical Care  Total time providing critical care: 32 minutes (Respiratory decompensation risk, intervention high flow supplemental oxygen therapy.)      Recent Results (from the past 24 hour(s))   ECG 12 Lead ED Triage Standing Order; SOA    Collection Time: 01/15/24  1:08 AM   Result Value Ref Range    QT Interval 370 ms    QTC Interval 402 ms   Respiratory Panel PCR w/COVID-19(SARS-CoV-2) GIANNI/SIENNA/ANNA/PAD/COR/ASHIA In-House, NP Swab in UTM/VTM, 2 HR TAT - Swab, Nasopharynx    Collection Time: 01/15/24  1:15 AM    Specimen: Nasopharynx; Swab   Result Value Ref Range    ADENOVIRUS, PCR Not Detected Not  Detected    Coronavirus 229E Not Detected Not Detected    Coronavirus HKU1 Not Detected Not Detected    Coronavirus NL63 Not Detected Not Detected    Coronavirus OC43 Not Detected Not Detected    COVID19 Not Detected Not Detected - Ref. Range    Human Metapneumovirus Not Detected Not Detected    Human Rhinovirus/Enterovirus Not Detected Not Detected    Influenza A PCR Not Detected Not Detected    Influenza B PCR Not Detected Not Detected    Parainfluenza Virus 1 Not Detected Not Detected    Parainfluenza Virus 2 Not Detected Not Detected    Parainfluenza Virus 3 Not Detected Not Detected    Parainfluenza Virus 4 Not Detected Not Detected    RSV, PCR Not Detected Not Detected    Bordetella pertussis pcr Not Detected Not Detected    Bordetella parapertussis PCR Not Detected Not Detected    Chlamydophila pneumoniae PCR Not Detected Not Detected    Mycoplasma pneumo by PCR Not Detected Not Detected   Comprehensive Metabolic Panel    Collection Time: 01/15/24  1:18 AM    Specimen: Blood   Result Value Ref Range    Glucose 304 (H) 65 - 99 mg/dL    BUN 33 (H) 8 - 23 mg/dL    Creatinine 1.64 (H) 0.76 - 1.27 mg/dL    Sodium 141 136 - 145 mmol/L    Potassium 5.2 3.5 - 5.2 mmol/L    Chloride 108 (H) 98 - 107 mmol/L    CO2 24.0 22.0 - 29.0 mmol/L    Calcium 8.7 8.6 - 10.5 mg/dL    Total Protein 7.0 6.0 - 8.5 g/dL    Albumin 3.5 3.5 - 5.2 g/dL    ALT (SGPT) 47 (H) 1 - 41 U/L    AST (SGOT) 32 1 - 40 U/L    Alkaline Phosphatase 110 39 - 117 U/L    Total Bilirubin 0.2 0.0 - 1.2 mg/dL    Globulin 3.5 gm/dL    A/G Ratio 1.0 g/dL    BUN/Creatinine Ratio 20.1 7.0 - 25.0    Anion Gap 9.0 5.0 - 15.0 mmol/L    eGFR 45.8 (L) >60.0 mL/min/1.73   BNP    Collection Time: 01/15/24  1:18 AM    Specimen: Blood   Result Value Ref Range    proBNP 905.7 (H) 0.0 - 900.0 pg/mL   Single High Sensitivity Troponin T    Collection Time: 01/15/24  1:18 AM    Specimen: Blood   Result Value Ref Range    HS Troponin T 83 (C) <22 ng/L   Green Top (Gel)     Collection Time: 01/15/24  1:18 AM   Result Value Ref Range    Extra Tube Hold for add-ons.    Lavender Top    Collection Time: 01/15/24  1:18 AM   Result Value Ref Range    Extra Tube hold for add-on    Gold Top - SST    Collection Time: 01/15/24  1:18 AM   Result Value Ref Range    Extra Tube Hold for add-ons.    Light Blue Top    Collection Time: 01/15/24  1:18 AM   Result Value Ref Range    Extra Tube Hold for add-ons.    CBC Auto Differential    Collection Time: 01/15/24  1:18 AM    Specimen: Blood   Result Value Ref Range    WBC 8.26 3.40 - 10.80 10*3/mm3    RBC 2.55 (L) 4.14 - 5.80 10*6/mm3    Hemoglobin 7.6 (L) 13.0 - 17.7 g/dL    Hematocrit 25.0 (L) 37.5 - 51.0 %    MCV 98.0 (H) 79.0 - 97.0 fL    MCH 29.8 26.6 - 33.0 pg    MCHC 30.4 (L) 31.5 - 35.7 g/dL    RDW 14.8 12.3 - 15.4 %    RDW-SD 53.1 37.0 - 54.0 fl    MPV 11.1 6.0 - 12.0 fL    Platelets 155 140 - 450 10*3/mm3    Neutrophil % 77.7 (H) 42.7 - 76.0 %    Lymphocyte % 9.6 (L) 19.6 - 45.3 %    Monocyte % 10.3 5.0 - 12.0 %    Eosinophil % 1.8 0.3 - 6.2 %    Basophil % 0.2 0.0 - 1.5 %    Immature Grans % 0.4 0.0 - 0.5 %    Neutrophils, Absolute 6.42 1.70 - 7.00 10*3/mm3    Lymphocytes, Absolute 0.79 0.70 - 3.10 10*3/mm3    Monocytes, Absolute 0.85 0.10 - 0.90 10*3/mm3    Eosinophils, Absolute 0.15 0.00 - 0.40 10*3/mm3    Basophils, Absolute 0.02 0.00 - 0.20 10*3/mm3    Immature Grans, Absolute 0.03 0.00 - 0.05 10*3/mm3    nRBC 0.0 0.0 - 0.2 /100 WBC   Lactic Acid, Plasma    Collection Time: 01/15/24  1:18 AM    Specimen: Blood   Result Value Ref Range    Lactate 1.6 0.5 - 2.0 mmol/L   D-dimer, Quantitative    Collection Time: 01/15/24  1:18 AM    Specimen: Blood   Result Value Ref Range    D-Dimer, Quantitative 0.83 (H) 0.00 - 0.66 MCGFEU/mL   Blood Gas, Arterial With Co-Ox    Collection Time: 01/15/24  2:04 AM    Specimen: Arterial Blood   Result Value Ref Range    Site Right Radial     Golden's Test N/A     pH, Arterial 7.366 7.350 - 7.450 pH units     pCO2, Arterial 43.2 35.0 - 45.0 mm Hg    pO2, Arterial 87.8 83.0 - 108.0 mm Hg    HCO3, Arterial 24.7 20.0 - 26.0 mmol/L    Base Excess, Arterial -0.7 (L) 0.0 - 2.0 mmol/L    Hemoglobin, Blood Gas 7.5 (L) 13.5 - 17.5 g/dL    Hematocrit, Blood Gas 23.0 (L) 38.0 - 51.0 %    Oxyhemoglobin 94.9 94 - 99 %    Methemoglobin 0.60 0.00 - 1.50 %    Carboxyhemoglobin 1.9 0 - 2 %    CO2 Content 26.0 22 - 33 mmol/L    Temperature 37.0     Barometric Pressure for Blood Gas      Modality HFNC     FIO2 45 %    Rate 0 Breaths/minute    PIP 0 cmH2O    IPAP 0     EPAP 0     pH, Temp Corrected 7.366 pH Units    pCO2, Temperature Corrected 43.2 35 - 48 mm Hg    pO2, Temperature Corrected 87.8 83 - 108 mm Hg     Note: In addition to lab results from this visit, the labs listed above may include labs taken at another facility or during a different encounter within the last 24 hours. Please correlate lab times with ED admission and discharge times for further clarification of the services performed during this visit.    XR Chest 1 View   Final Result   Impression:   Multifocal alveolar and interstitial opacities favors pneumonia.         Electronically Signed: Boby Riggs MD     1/15/2024 2:36 AM EST     Workstation ID: [id number]        Vitals:    01/15/24 0140 01/15/24 0200 01/15/24 0220 01/15/24 0240   BP: 133/66 156/64 151/62 151/68   BP Location:       Patient Position:       Pulse: 70 71 71 74   Resp:       Temp:       SpO2: 97% 96% 95% 96%   Weight:       Height:         Medications   sodium chloride 0.9 % flush 10 mL (has no administration in time range)   piperacillin-tazobactam (ZOSYN) 3.375 g in iso-osmotic dextrose 50 ml (premix) (has no administration in time range)   vancomycin 1750 mg/500 mL 0.9% NS IVPB ([facility]) (has no administration in time range)     ECG/EMG Results (last 24 hours)       Procedure Component Value Units Date/Time    ECG 12 Lead ED Triage Standing Order; SOA [[id number]] Collected: 01/15/24 0108     Updated:  01/15/24 0109     QT Interval 370 ms      QTC Interval 402 ms     Narrative:      Test Reason : ED Triage Standing Order~  Blood Pressure :   */*   mmHG  Vent. Rate :  71 BPM     Atrial Rate :  71 BPM     P-R Int : 194 ms          QRS Dur :  66 ms      QT Int : 370 ms       P-R-T Axes :  47  62  46 degrees     QTc Int : 402 ms    Normal sinus rhythm  Normal ECG  When compared with ECG of 08-AUG-2023 09:39,  premature supraventricular complexes are no longer present  QT has shortened    Referred By: ERMD           Confirmed By:           ECG 12 Lead ED Triage Standing Order; SOA   Preliminary Result   Test Reason : ED Triage Standing Order~   Blood Pressure :   */*   mmHG   Vent. Rate :  71 BPM     Atrial Rate :  71 BPM      P-R Int : 194 ms          QRS Dur :  66 ms       QT Int : 370 ms       P-R-T Axes :  47  62  46 degrees      QTc Int : 402 ms      Normal sinus rhythm   Normal ECG   When compared with ECG of 08-AUG-2023 09:39,   premature supraventricular complexes are no longer present   QT has shortened      Referred By: ERMD           Confirmed By:               Final diagnoses:   Acute respiratory failure with hypoxia   History of dementia   Multifocal pneumonia       ED Disposition  ED Disposition       ED Disposition   Decision to Admit    Condition   --    Comment   --               No follow-up provider specified.       Medication List      No changes were made to your prescriptions during this visit.            Farhana Valerio MD  01/15/24 0708

## 2024-01-15 NOTE — ED NOTES
Omkar Nava    Nursing Report ED to Floor:  Mental status: ANOX1, PT HAS ADVANCED DEMENTIA,, SOMETIMES THINKS HE'S AT HOME AND THAT HIS DAUGHTER IS HERE  Ambulatory status: WC  Oxygen Therapy:  HIGH FLOW  Cardiac Rhythm: NSR  Admitted from: ED/[facility]  Safety Concerns:  FALL RISK  Social Issues: N/A  ED Room #:  20    PT HAS HX OF BEING COMBATIVE. PT CAN TAKE PILLS WHOLE AND TAKE THIN LIQUIDS PER [facility]    ED Nurse Phone Extension - [phone] or may call [phone].      HPI:   Chief Complaint   Patient presents with    Shortness of Breath       Past Medical History:  Past Medical History:   Diagnosis Date    Anemia     Dementia     Diabetes mellitus     Dysphagia     GERD (gastroesophageal reflux disease)     History of alcohol abuse     History of cocaine use     History of marijuana use     Hypertension     Osteomyelitis     Poor historian     records obtained from nursing home records & his family    Visual impairment         Past Surgical History:  Past Surgical History:   Procedure Laterality Date    AMPUTATION FOOT Left 10/18/2022    Procedure: PARTIAL FIRST RAY AMPUTATION LEFT;  Surgeon: Yeison Petty MD;  Location: [facility] OR;  Service: Orthopedics;  Laterality: Left;    AMPUTATION FOOT Left 12/5/2022    Procedure: Transmetatarsal of Left Foot;  Surgeon: Yeison Petty MD;  Location:  [facility] OR;  Service: Orthopedics;  Laterality: Left;    EYE SURGERY          Admitting Doctor:   Briseyda Moscoso MD    Consulting Provider(s):  Consults       No orders found from 12/17/2023 to 1/16/2024.             Admitting Diagnosis:   The primary encounter diagnosis was Acute respiratory failure with hypoxia. Diagnoses of History of dementia and Multifocal pneumonia were also pertinent to this visit.    Most Recent Vitals:   Vitals:    01/15/24 0630 01/15/24 0646 01/15/24 0655 01/15/24 0700   BP: 128/80   150/78   BP Location:       Patient Position:       Pulse: 65 61  65   Resp:       Temp:   97.8 °F (36.6 °C)     SpO2: 97% 96%  94%   Weight:       Height:           Active LDAs/IV Access:   Lines, Drains & Airways       Active LDAs       Name Placement date Placement time Site Days    Peripheral IV 01/15/24 0114 Anterior;Proximal;Right;Upper Arm 01/15/24  0114  Arm  less than 1                    Labs (abnormal labs have a star):   Labs Reviewed   COMPREHENSIVE METABOLIC PANEL - Abnormal; Notable for the following components:       Result Value    Glucose 304 (*)     BUN 33 (*)     Creatinine 1.64 (*)     Chloride 108 (*)     ALT (SGPT) 47 (*)     eGFR 45.8 (*)     All other components within normal limits    Narrative:     GFR Normal >60  Chronic Kidney Disease <60  Kidney Failure <15     BNP (IN-HOUSE) - Abnormal; Notable for the following components:    proBNP 905.7 (*)     All other components within normal limits    Narrative:     This assay is used as an aid in the diagnosis of individuals suspected of having heart failure. It can be used as an aid in the diagnosis of acute decompensated heart failure (ADHF) in patients presenting with signs and symptoms of ADHF to the emergency department (ED). In addition, NT-proBNP of <300 pg/mL indicates ADHF is not likely.    Age Range Result Interpretation  NT-proBNP Concentration (pg/mL:      <50             Positive            >450                   Gray                 300-450                    Negative             <300    50-75           Positive            >900                  Gray                300-900                  Negative            <300      >75             Positive            >1800                  Gray                300-1800                  Negative            <300   SINGLE HSTROPONIN T - Abnormal; Notable for the following components:    HS Troponin T 83 (*)     All other components within normal limits    Narrative:     High Sensitive Troponin T Reference Range:  <14.0 ng/L- Negative Female for AMI  <22.0 ng/L- Negative Male for AMI  >=14 - Abnormal Female  "indicating possible myocardial injury.  >=22 - Abnormal Male indicating possible myocardial injury.   Clinicians would have to utilize clinical acumen, EKG, Troponin, and serial changes to determine if it is an Acute Myocardial Infarction or myocardial injury due to an underlying chronic condition.        CBC WITH AUTO DIFFERENTIAL - Abnormal; Notable for the following components:    RBC 2.55 (*)     Hemoglobin 7.6 (*)     Hematocrit 25.0 (*)     MCV 98.0 (*)     MCHC 30.4 (*)     Neutrophil % 77.7 (*)     Lymphocyte % 9.6 (*)     All other components within normal limits   D-DIMER, QUANTITATIVE - Abnormal; Notable for the following components:    D-Dimer, Quantitative 0.83 (*)     All other components within normal limits    Narrative:     According to the assay 's published package insert, a normal (<0.50 MCGFEU/mL) D-dimer result in conjunction with a non-high clinical probability assessment, excludes deep vein thrombosis (DVT) and pulmonary embolism (PE) with high sensitivity.    D-dimer values increase with age and this can make VTE exclusion of an older population difficult. To address this, the [employer], based on best available evidence and recent guidelines, recommends that clinicians use age-adjusted D-dimer thresholds in patients greater than 50 years of age with: a) a low probability of PE who do not meet all Pulmonary Embolism Rule Out Criteria, or b) in those with intermediate probability of PE.   The formula for an age-adjusted D-dimer cut-off is \"age/100\".  For example, a 60 year old patient would have an age-adjusted cut-off of 0.60 MCGFEU/mL and an 80 year old 0.80 MCGFEU/mL.   HIGH SENSITIVITIY TROPONIN T 2HR - Abnormal; Notable for the following components:    HS Troponin T 97 (*)     Troponin T Delta 14 (*)     All other components within normal limits    Narrative:     High Sensitive Troponin T Reference Range:  <14.0 ng/L- Negative Female for AMI  <22.0 ng/L- " "Negative Male for AMI  >=14 - Abnormal Female indicating possible myocardial injury.  >=22 - Abnormal Male indicating possible myocardial injury.   Clinicians would have to utilize clinical acumen, EKG, Troponin, and serial changes to determine if it is an Acute Myocardial Infarction or myocardial injury due to an underlying chronic condition.        BLOOD GAS, ARTERIAL W/CO-OXIMETRY - Abnormal; Notable for the following components:    Base Excess, Arterial -0.7 (*)     Hemoglobin, Blood Gas 7.5 (*)     Hematocrit, Blood Gas 23.0 (*)     All other components within normal limits   PROCALCITONIN - Abnormal; Notable for the following components:    Procalcitonin 0.26 (*)     All other components within normal limits    Narrative:     As a Marker for Sepsis (Non-Neonates):    1. <0.5 ng/mL represents a low risk of severe sepsis and/or septic shock.  2. >2 ng/mL represents a high risk of severe sepsis and/or septic shock.    As a Marker for Lower Respiratory Tract Infections that require antibiotic therapy:    PCT on Admission    Antibiotic Therapy       6-12 Hrs later    >0.5                Strongly Recommended  >0.25 - <0.5        Recommended   0.1 - 0.25          Discouraged              Remeasure/reassess PCT  <0.1                Strongly Discouraged     Remeasure/reassess PCT    As 28 day mortality risk marker: \"Change in Procalcitonin Result\" (>80% or <=80%) if Day 0 (or Day 1) and Day 4 values are available. Refer to http://www.[employer]-pct-calculator.com    Change in PCT <=80%  A decrease of PCT levels below or equal to 80% defines a positive change in PCT test result representing a higher risk for 28-day all-cause mortality of patients diagnosed with severe sepsis for septic shock.    Change in PCT >80%  A decrease of PCT levels of more than 80% defines a negative change in PCT result representing a lower risk for 28-day all-cause mortality of patients diagnosed with severe sepsis or septic shock.      TROPONIN - " Abnormal; Notable for the following components:    HS Troponin T 73 (*)     All other components within normal limits    Narrative:     High Sensitive Troponin T Reference Range:  <14.0 ng/L- Negative Female for AMI  <22.0 ng/L- Negative Male for AMI  >=14 - Abnormal Female indicating possible myocardial injury.  >=22 - Abnormal Male indicating possible myocardial injury.   Clinicians would have to utilize clinical acumen, EKG, Troponin, and serial changes to determine if it is an Acute Myocardial Infarction or myocardial injury due to an underlying chronic condition.        BASIC METABOLIC PANEL - Abnormal; Notable for the following components:    Glucose 245 (*)     BUN 32 (*)     Creatinine 1.56 (*)     Potassium 6.3 (*)     Calcium 8.4 (*)     eGFR 48.7 (*)     All other components within normal limits    Narrative:     GFR Normal >60  Chronic Kidney Disease <60  Kidney Failure <15     CBC (NO DIFF) - Abnormal; Notable for the following components:    RBC 2.35 (*)     Hemoglobin 7.1 (*)     Hematocrit 22.3 (*)     All other components within normal limits   POCT GLUCOSE FINGERSTICK - Abnormal; Notable for the following components:    Glucose 253 (*)     All other components within normal limits   RESPIRATORY PANEL PCR W/ COVID-19 (SARS-COV-2), NP SWAB IN UTM/VTP, 2 HR TAT - Normal    Narrative:     In the setting of a positive respiratory panel with a viral infection PLUS a negative procalcitonin without other underlying concern for bacterial infection, consider observing off antibiotics or discontinuation of antibiotics and continue supportive care. If the respiratory panel is positive for atypical bacterial infection (Bordetella pertussis, Chlamydophila pneumoniae, or Mycoplasma pneumoniae), consider antibiotic de-escalation to target atypical bacterial infection.   LACTIC ACID, PLASMA - Normal   MAGNESIUM - Normal   BLOOD CULTURE   BLOOD CULTURE   LEGIONELLA ANTIGEN, URINE   STREP PNEUMO AG, URINE OR CSF    RESPIRATORY CULTURE   MRSA SCREEN, PCR   RAINBOW DRAW    Narrative:     The following orders were created for panel order [place] Draw.  Procedure                               Abnormality         Status                     ---------                               -----------         ------                     Green Top (Gel)[[id number]]                                  Final result               Lavender Top[[id number]]                                     Final result               Gold Top - SST[[id number]]                                   Final result               [place] Top[[id number]]                                         Final result               Light Blue Top[[id number]]                                   Final result                 Please view results for these tests on the individual orders.   BLOOD GAS, ARTERIAL   FERRITIN   IRON PROFILE   POCT GLUCOSE FINGERSTICK   POCT GLUCOSE FINGERSTICK   POCT GLUCOSE FINGERSTICK   POCT GLUCOSE FINGERSTICK   POCT GLUCOSE FINGERSTICK   CBC AND DIFFERENTIAL    Narrative:     The following orders were created for panel order CBC & Differential.  Procedure                               Abnormality         Status                     ---------                               -----------         ------                     CBC Auto Differential[[id number]]        Abnormal            Final result                 Please view results for these tests on the individual orders.   GREEN TOP   LAVENDER TOP   GOLD TOP - SST   GRAY TOP   LIGHT BLUE TOP       Meds Given in ED:   Medications   sodium chloride 0.9 % flush 10 mL (has no administration in time range)   heparin (porcine) 5000 UNIT/ML injection 5,000 Units (5,000 Units Subcutaneous Given 1/15/24 0618)   furosemide (LASIX) injection 80 mg (has no administration in time range)   acetaminophen (TYLENOL) tablet 650 mg (has no administration in time range)   albuterol (PROVENTIL) nebulizer solution 0.083% 2.5 mg/3mL (has no  administration in time range)   brimonidine (ALPHAGAN) 0.2 % ophthalmic solution 1 drop (has no administration in time range)     And   timolol (TIMOPTIC) 0.5 % ophthalmic solution 1 drop (has no administration in time range)   divalproex (DEPAKOTE) DR tablet 375 mg (has no administration in time range)   famotidine (PEPCID) tablet 20 mg (has no administration in time range)   ferrous sulfate tablet 325 mg (has no administration in time range)   hydrALAZINE (APRESOLINE) tablet 75 mg ( Oral Dose Auto Held 1/23/24 2200)   hydrOXYzine (ATARAX) tablet 50 mg (has no administration in time range)   melatonin tablet 5 mg (has no administration in time range)   thiamine (VITAMIN B-1) tablet 100 mg (has no administration in time range)   traZODone (DESYREL) tablet 25 mg (has no administration in time range)   sodium chloride 0.9 % flush 10 mL (has no administration in time range)   sodium chloride 0.9 % flush 10 mL (has no administration in time range)   sodium chloride 0.9 % infusion 40 mL (has no administration in time range)   sennosides-docusate (PERICOLACE) 8.6-50 MG per tablet 2 tablet (has no administration in time range)     And   polyethylene glycol (MIRALAX) packet 17 g (has no administration in time range)     And   bisacodyl (DULCOLAX) EC tablet 5 mg (has no administration in time range)     And   bisacodyl (DULCOLAX) suppository 10 mg (has no administration in time range)   Potassium Replacement - Follow Nurse / BPA Driven Protocol (has no administration in time range)   Magnesium Standard Dose Replacement - Follow Nurse / BPA Driven Protocol (has no administration in time range)   Phosphorus Replacement - Follow Nurse / BPA Driven Protocol (has no administration in time range)   Calcium Replacement - Follow Nurse / BPA Driven Protocol (has no administration in time range)   dextrose (GLUTOSE) oral gel 15 g (has no administration in time range)   dextrose (D50W) (25 g/50 mL) IV injection 25 g (has no  administration in time range)   glucagon (GLUCAGEN) injection 1 mg (has no administration in time range)   insulin regular (humuLIN R,novoLIN R) injection 2-7 Units (4 Units Subcutaneous Given 1/15/24 0627)   doxycycline (VIBRAMYCIN) 100 mg in sodium chloride 0.9 % 100 mL IVPB (100 mg Intravenous New Bag 1/15/24 0653)   Pharmacy to dose vancomycin (has no administration in time range)   cefepime 2 gm IVPB in 100 ml NS (MBP) (has no administration in time range)   vancomycin (VANCOCIN) 1000 mg/200 mL dextrose 5% IVPB (has no administration in time range)   carvedilol (COREG) tablet 25 mg (has no administration in time range)   cholecalciferol (VITAMIN D3) tablet 6,000 Units (has no administration in time range)   sodium zirconium cyclosilicate (LOKELMA) pack 10 g (has no administration in time range)   calcium gluconate 2000-675 MG/100ML NACL IVPB (has no administration in time range)   piperacillin-tazobactam (ZOSYN) 4.5 g in iso-osmotic dextrose 100 mL IVPB (premix) (0 g Intravenous Stopped 1/15/24 0435)   vancomycin 1750 mg/500 mL 0.9% NS IVPB ([facility]) (0 mg Intravenous Stopped 1/15/24 0650)   furosemide (LASIX) injection 80 mg (80 mg Intravenous Given 1/15/24 0436)     Pharmacy to dose vancomycin,

## 2024-01-15 NOTE — PROGRESS NOTES
Pharmacy Consult-Vancomycin Dosing  Omkar Nava is a  68 y.o. male receiving vancomycin therapy.     Indication: pneumonia  Consulting Provider: hospitalist  ID Consult: No    Goal AUC: 400 - 600 mg/L*hr    Current Antimicrobial Therapy  Anti-Infectives (From admission, onward)      Ordered     Dose/Rate Route Frequency Start Stop    01/15/24 0403  vancomycin (VANCOCIN) 1000 mg/200 mL dextrose 5% IVPB        Ordering Provider: Kings Jc, PharmD   See [facility] for full Linked Orders Report.    10.7 mg/kg × 93.4 kg  over 60 Minutes Intravenous Every 24 Hours Scheduled 01/16/24 0800 01/20/24 0859    01/15/24 0356  cefepime 2 gm IVPB in 100 ml NS (MBP)        Ordering Provider: Alberto Oh MD    2,000 mg  over 4 Hours Intravenous Every 12 Hours 01/15/24 1200 01/20/24 1159    01/15/24 0356  doxycycline (VIBRAMYCIN) 100 mg in sodium chloride 0.9 % 100 mL IVPB        Ordering Provider: Alberto Oh MD    100 mg  over 60 Minutes Intravenous Every 12 Hours Scheduled 01/15/24 0500 01/20/24 0559    01/15/24 0356  Pharmacy to dose vancomycin        Ordering Provider: Alberto Oh MD     Does not apply Continuous PRN 01/15/24 0355 01/20/24 0354    01/15/24 0251  piperacillin-tazobactam (ZOSYN) 4.5 g in iso-osmotic dextrose 100 mL IVPB (premix)        Ordering Provider: Farhana Valerio MD    4.5 g  over 30 Minutes Intravenous Once 01/15/24 0307      01/15/24 0251  vancomycin 1750 mg/500 mL 0.9% NS IVPB ([facility])        Ordering Provider: Farhana Valerio MD    20 mg/kg × 93.4 kg  over 105 Minutes Intravenous Once 01/15/24 0307              Allergies  Allergies as of 01/15/2024    (No Known Allergies)       Labs  Results from last 7 days   Lab Units 01/15/24  0118   BUN mg/dL 33*   CREATININE mg/dL 1.64*     Results from last 7 days   Lab Units 01/15/24  0118   WBC 10*3/mm3 8.26       Evaluation of Dosing  Last Dose Received in the ED/Outside Facility:               N/A  Is Patient on Dialysis or  "Renal Replacement:               No    Ht - 152.4 cm (60\")  Wt - 93.4 kg (206 lb)    Estimated Creatinine Clearance: 42.2 mL/min (A) (by C-G formula based on SCr of 1.64 mg/dL (H)).  Intake & Output (last 3 days)       None            Microbiology and Radiology  Microbiology Results (last 10 days)       Procedure Component Value - Date/Time    Respiratory Panel PCR w/COVID-19(SARS-CoV-2) [facility] In-House, NP Swab in UTM/VTM, 2 HR TAT - Swab, Nasopharynx [[id number]]  (Normal) Collected: 01/15/24 0115    Lab Status: Final result Specimen: Swab from Nasopharynx Updated: 01/15/24 0225     ADENOVIRUS, PCR Not Detected     Coronavirus 229E Not Detected     Coronavirus HKU1 Not Detected     Coronavirus NL63 Not Detected     Coronavirus OC43 Not Detected     COVID19 Not Detected     Human Metapneumovirus Not Detected     Human Rhinovirus/Enterovirus Not Detected     Influenza A PCR Not Detected     Influenza B PCR Not Detected     Parainfluenza Virus 1 Not Detected     Parainfluenza Virus 2 Not Detected     Parainfluenza Virus 3 Not Detected     Parainfluenza Virus 4 Not Detected     RSV, PCR Not Detected     Bordetella pertussis pcr Not Detected     Bordetella parapertussis PCR Not Detected     Chlamydophila pneumoniae PCR Not Detected     Mycoplasma pneumo by PCR Not Detected    Narrative:      In the setting of a positive respiratory panel with a viral infection PLUS a negative procalcitonin without other underlying concern for bacterial infection, consider observing off antibiotics or discontinuation of antibiotics and continue supportive care. If the respiratory panel is positive for atypical bacterial infection (Bordetella pertussis, Chlamydophila pneumoniae, or Mycoplasma pneumoniae), consider antibiotic de-escalation to target atypical bacterial infection.            Reported Vancomycin Levels                   InsightRX AUC Calculation:  Current AUC:   -- mg/L*hr    Predicted Steady State AUC on " Current Dose: -- mg/L*hr  _________________________________  Predicted Steady State AUC on New Dose:   562 mg/L*hr    Assessment/Plan:  1. Will give vancomycin 1750 mg IV once                               followed by      Vancomycin 1000 mg IV q 24 hours    2. Vancomycin trough is scheduled 1/18 at 06:00 prior to the 4th dose. Predicted Steady State AUC at current dose: 562 mg/L*hr.      Kings Jc, PharmD, [facility]  1/15/2024  04:04 EST

## 2024-01-15 NOTE — Clinical Note
Level of Care: Telemetry [5]   Diagnosis: Hypoxia [216092]   Admitting Physician: EMILY BILLS [[id number]   Attending Physician: EMILY BILLS [[id number]]

## 2024-01-16 ENCOUNTER — APPOINTMENT (OUTPATIENT)
Dept: CARDIOLOGY | Facility: HOSPITAL | Age: 69
DRG: 177 | End: 2024-01-16
Payer: MEDICARE

## 2024-01-16 ENCOUNTER — APPOINTMENT (OUTPATIENT)
Dept: GENERAL RADIOLOGY | Facility: HOSPITAL | Age: 69
DRG: 177 | End: 2024-01-16
Payer: MEDICARE

## 2024-01-16 LAB
ABO GROUP BLD: NORMAL
ANION GAP SERPL CALCULATED.3IONS-SCNC: 10 MMOL/L (ref 5–15)
BASOPHILS # BLD AUTO: 0.02 10*3/MM3 (ref 0–0.2)
BASOPHILS NFR BLD AUTO: 0.3 % (ref 0–1.5)
BLD GP AB SCN SERPL QL: NEGATIVE
BUN SERPL-MCNC: 26 MG/DL (ref 8–23)
BUN/CREAT SERPL: 17.1 (ref 7–25)
CALCIUM SPEC-SCNC: 8.4 MG/DL (ref 8.6–10.5)
CHLORIDE SERPL-SCNC: 107 MMOL/L (ref 98–107)
CO2 SERPL-SCNC: 26 MMOL/L (ref 22–29)
CREAT SERPL-MCNC: 1.52 MG/DL (ref 0.76–1.27)
DEPRECATED RDW RBC AUTO: 49.5 FL (ref 37–54)
EGFRCR SERPLBLD CKD-EPI 2021: 50.2 ML/MIN/1.73
EOSINOPHIL # BLD AUTO: 0.15 10*3/MM3 (ref 0–0.4)
EOSINOPHIL NFR BLD AUTO: 2.5 % (ref 0.3–6.2)
ERYTHROCYTE [DISTWIDTH] IN BLOOD BY AUTOMATED COUNT: 14.5 % (ref 12.3–15.4)
GLUCOSE BLDC GLUCOMTR-MCNC: 103 MG/DL (ref 70–130)
GLUCOSE BLDC GLUCOMTR-MCNC: 105 MG/DL (ref 70–130)
GLUCOSE BLDC GLUCOMTR-MCNC: 146 MG/DL (ref 70–130)
GLUCOSE BLDC GLUCOMTR-MCNC: 195 MG/DL (ref 70–130)
GLUCOSE BLDC GLUCOMTR-MCNC: 208 MG/DL (ref 70–130)
GLUCOSE BLDC GLUCOMTR-MCNC: 255 MG/DL (ref 70–130)
GLUCOSE SERPL-MCNC: 94 MG/DL (ref 65–99)
HCT VFR BLD AUTO: 20.7 % (ref 37.5–51)
HCT VFR BLD AUTO: 24.7 % (ref 37.5–51)
HEMOCCULT STL QL: NEGATIVE
HGB BLD-MCNC: 6.4 G/DL (ref 13–17.7)
HGB BLD-MCNC: 8 G/DL (ref 13–17.7)
IMM GRANULOCYTES # BLD AUTO: 0.02 10*3/MM3 (ref 0–0.05)
IMM GRANULOCYTES NFR BLD AUTO: 0.3 % (ref 0–0.5)
LYMPHOCYTES # BLD AUTO: 1.5 10*3/MM3 (ref 0.7–3.1)
LYMPHOCYTES NFR BLD AUTO: 25.4 % (ref 19.6–45.3)
MAGNESIUM SERPL-MCNC: 2 MG/DL (ref 1.6–2.4)
MCH RBC QN AUTO: 29.2 PG (ref 26.6–33)
MCHC RBC AUTO-ENTMCNC: 30.9 G/DL (ref 31.5–35.7)
MCV RBC AUTO: 94.5 FL (ref 79–97)
MONOCYTES # BLD AUTO: 0.82 10*3/MM3 (ref 0.1–0.9)
MONOCYTES NFR BLD AUTO: 13.9 % (ref 5–12)
NEUTROPHILS NFR BLD AUTO: 3.39 10*3/MM3 (ref 1.7–7)
NEUTROPHILS NFR BLD AUTO: 57.6 % (ref 42.7–76)
NRBC BLD AUTO-RTO: 0 /100 WBC (ref 0–0.2)
PLATELET # BLD AUTO: 152 10*3/MM3 (ref 140–450)
PMV BLD AUTO: 11.2 FL (ref 6–12)
POTASSIUM SERPL-SCNC: 4.1 MMOL/L (ref 3.5–5.2)
QT INTERVAL: 370 MS
QTC INTERVAL: 402 MS
RBC # BLD AUTO: 2.19 10*6/MM3 (ref 4.14–5.8)
RH BLD: POSITIVE
SODIUM SERPL-SCNC: 143 MMOL/L (ref 136–145)
T&S EXPIRATION DATE: NORMAL
VANCOMYCIN TROUGH SERPL-MCNC: 8.6 MCG/ML (ref 5–20)
WBC NRBC COR # BLD AUTO: 5.9 10*3/MM3 (ref 3.4–10.8)

## 2024-01-16 PROCEDURE — 86901 BLOOD TYPING SEROLOGIC RH(D): CPT | Performed by: STUDENT IN AN ORGANIZED HEALTH CARE EDUCATION/TRAINING PROGRAM

## 2024-01-16 PROCEDURE — 99232 SBSQ HOSP IP/OBS MODERATE 35: CPT | Performed by: STUDENT IN AN ORGANIZED HEALTH CARE EDUCATION/TRAINING PROGRAM

## 2024-01-16 PROCEDURE — 86923 COMPATIBILITY TEST ELECTRIC: CPT

## 2024-01-16 PROCEDURE — 83735 ASSAY OF MAGNESIUM: CPT | Performed by: STUDENT IN AN ORGANIZED HEALTH CARE EDUCATION/TRAINING PROGRAM

## 2024-01-16 PROCEDURE — 82272 OCCULT BLD FECES 1-3 TESTS: CPT | Performed by: STUDENT IN AN ORGANIZED HEALTH CARE EDUCATION/TRAINING PROGRAM

## 2024-01-16 PROCEDURE — 25010000002 FUROSEMIDE PER 20 MG: Performed by: STUDENT IN AN ORGANIZED HEALTH CARE EDUCATION/TRAINING PROGRAM

## 2024-01-16 PROCEDURE — 25010000002 VANCOMYCIN 10 G RECONSTITUTED SOLUTION: Performed by: STUDENT IN AN ORGANIZED HEALTH CARE EDUCATION/TRAINING PROGRAM

## 2024-01-16 PROCEDURE — 93306 TTE W/DOPPLER COMPLETE: CPT

## 2024-01-16 PROCEDURE — 97162 PT EVAL MOD COMPLEX 30 MIN: CPT

## 2024-01-16 PROCEDURE — 63710000001 INSULIN REGULAR HUMAN PER 5 UNITS: Performed by: STUDENT IN AN ORGANIZED HEALTH CARE EDUCATION/TRAINING PROGRAM

## 2024-01-16 PROCEDURE — 97166 OT EVAL MOD COMPLEX 45 MIN: CPT

## 2024-01-16 PROCEDURE — 25010000002 CEFEPIME PER 500 MG: Performed by: STUDENT IN AN ORGANIZED HEALTH CARE EDUCATION/TRAINING PROGRAM

## 2024-01-16 PROCEDURE — 80202 ASSAY OF VANCOMYCIN: CPT

## 2024-01-16 PROCEDURE — 86850 RBC ANTIBODY SCREEN: CPT | Performed by: STUDENT IN AN ORGANIZED HEALTH CARE EDUCATION/TRAINING PROGRAM

## 2024-01-16 PROCEDURE — 85025 COMPLETE CBC W/AUTO DIFF WBC: CPT | Performed by: STUDENT IN AN ORGANIZED HEALTH CARE EDUCATION/TRAINING PROGRAM

## 2024-01-16 PROCEDURE — 80048 BASIC METABOLIC PNL TOTAL CA: CPT | Performed by: STUDENT IN AN ORGANIZED HEALTH CARE EDUCATION/TRAINING PROGRAM

## 2024-01-16 PROCEDURE — 86900 BLOOD TYPING SEROLOGIC ABO: CPT

## 2024-01-16 PROCEDURE — 36430 TRANSFUSION BLD/BLD COMPNT: CPT

## 2024-01-16 PROCEDURE — 82948 REAGENT STRIP/BLOOD GLUCOSE: CPT

## 2024-01-16 PROCEDURE — 25810000003 SODIUM CHLORIDE 0.9 % SOLUTION: Performed by: STUDENT IN AN ORGANIZED HEALTH CARE EDUCATION/TRAINING PROGRAM

## 2024-01-16 PROCEDURE — 86900 BLOOD TYPING SEROLOGIC ABO: CPT | Performed by: STUDENT IN AN ORGANIZED HEALTH CARE EDUCATION/TRAINING PROGRAM

## 2024-01-16 PROCEDURE — 25010000002 HEPARIN (PORCINE) PER 1000 UNITS: Performed by: STUDENT IN AN ORGANIZED HEALTH CARE EDUCATION/TRAINING PROGRAM

## 2024-01-16 PROCEDURE — P9016 RBC LEUKOCYTES REDUCED: HCPCS

## 2024-01-16 PROCEDURE — 74018 RADEX ABDOMEN 1 VIEW: CPT

## 2024-01-16 PROCEDURE — 85014 HEMATOCRIT: CPT | Performed by: STUDENT IN AN ORGANIZED HEALTH CARE EDUCATION/TRAINING PROGRAM

## 2024-01-16 PROCEDURE — 97535 SELF CARE MNGMENT TRAINING: CPT

## 2024-01-16 PROCEDURE — 85018 HEMOGLOBIN: CPT | Performed by: STUDENT IN AN ORGANIZED HEALTH CARE EDUCATION/TRAINING PROGRAM

## 2024-01-16 RX ORDER — FLUOXETINE HYDROCHLORIDE 20 MG/1
20 CAPSULE ORAL 2 TIMES DAILY
Status: DISCONTINUED | OUTPATIENT
Start: 2024-01-16 | End: 2024-01-21 | Stop reason: HOSPADM

## 2024-01-16 RX ORDER — AMLODIPINE BESYLATE 5 MG/1
5 TABLET ORAL
Status: DISCONTINUED | OUTPATIENT
Start: 2024-01-16 | End: 2024-01-18

## 2024-01-16 RX ORDER — ATORVASTATIN CALCIUM 20 MG/1
20 TABLET, FILM COATED ORAL NIGHTLY
Status: DISCONTINUED | OUTPATIENT
Start: 2024-01-16 | End: 2024-01-21 | Stop reason: HOSPADM

## 2024-01-16 RX ADMIN — TIMOLOL MALEATE 1 DROP: 5 SOLUTION/ DROPS OPHTHALMIC at 17:27

## 2024-01-16 RX ADMIN — FAMOTIDINE 20 MG: 20 TABLET, FILM COATED ORAL at 08:49

## 2024-01-16 RX ADMIN — HEPARIN SODIUM 5000 UNITS: 5000 INJECTION INTRAVENOUS; SUBCUTANEOUS at 15:52

## 2024-01-16 RX ADMIN — HEPARIN SODIUM 5000 UNITS: 5000 INJECTION INTRAVENOUS; SUBCUTANEOUS at 22:19

## 2024-01-16 RX ADMIN — AMLODIPINE BESYLATE 5 MG: 5 TABLET ORAL at 15:52

## 2024-01-16 RX ADMIN — BRIMONIDINE TARTRATE 1 DROP: 2 SOLUTION OPHTHALMIC at 17:27

## 2024-01-16 RX ADMIN — VANCOMYCIN HYDROCHLORIDE 1250 MG: 10 INJECTION, POWDER, LYOPHILIZED, FOR SOLUTION INTRAVENOUS at 14:12

## 2024-01-16 RX ADMIN — ATORVASTATIN CALCIUM 20 MG: 20 TABLET, FILM COATED ORAL at 22:05

## 2024-01-16 RX ADMIN — THIAMINE HCL TAB 100 MG 100 MG: 100 TAB at 08:49

## 2024-01-16 RX ADMIN — HEPARIN SODIUM 5000 UNITS: 5000 INJECTION INTRAVENOUS; SUBCUTANEOUS at 05:55

## 2024-01-16 RX ADMIN — Medication 6000 UNITS: at 08:49

## 2024-01-16 RX ADMIN — DIVALPROEX SODIUM 375 MG: 125 TABLET, DELAYED RELEASE ORAL at 08:49

## 2024-01-16 RX ADMIN — CARVEDILOL 25 MG: 12.5 TABLET, FILM COATED ORAL at 08:49

## 2024-01-16 RX ADMIN — Medication 10 ML: at 22:03

## 2024-01-16 RX ADMIN — CARVEDILOL 25 MG: 12.5 TABLET, FILM COATED ORAL at 17:27

## 2024-01-16 RX ADMIN — FLUOXETINE 20 MG: 20 CAPSULE ORAL at 22:10

## 2024-01-16 RX ADMIN — INSULIN HUMAN 3 UNITS: 100 INJECTION, SOLUTION PARENTERAL at 00:22

## 2024-01-16 RX ADMIN — DIVALPROEX SODIUM 375 MG: 125 TABLET, DELAYED RELEASE ORAL at 22:03

## 2024-01-16 RX ADMIN — FAMOTIDINE 20 MG: 20 TABLET, FILM COATED ORAL at 22:09

## 2024-01-16 RX ADMIN — Medication 10 ML: at 17:28

## 2024-01-16 RX ADMIN — Medication 5 MG: at 22:09

## 2024-01-16 RX ADMIN — CEFEPIME 2000 MG: 2 INJECTION, POWDER, FOR SOLUTION INTRAVENOUS at 15:52

## 2024-01-16 RX ADMIN — FUROSEMIDE 80 MG: 10 INJECTION, SOLUTION INTRAMUSCULAR; INTRAVENOUS at 05:56

## 2024-01-16 RX ADMIN — CEFEPIME 2000 MG: 2 INJECTION, POWDER, FOR SOLUTION INTRAVENOUS at 00:22

## 2024-01-16 RX ADMIN — FERROUS SULFATE TAB 325 MG (65 MG ELEMENTAL FE) 325 MG: 325 (65 FE) TAB at 08:48

## 2024-01-16 RX ADMIN — TIMOLOL MALEATE 1 DROP: 5 SOLUTION/ DROPS OPHTHALMIC at 22:11

## 2024-01-16 RX ADMIN — THIAMINE HCL TAB 100 MG 100 MG: 100 TAB at 15:52

## 2024-01-16 RX ADMIN — BRIMONIDINE TARTRATE 1 DROP: 2 SOLUTION OPHTHALMIC at 22:10

## 2024-01-16 RX ADMIN — ACETAMINOPHEN 650 MG: 325 TABLET ORAL at 22:05

## 2024-01-16 RX ADMIN — TRAZODONE HYDROCHLORIDE 25 MG: 50 TABLET ORAL at 22:06

## 2024-01-16 RX ADMIN — Medication 10 ML: at 08:50

## 2024-01-16 RX ADMIN — INSULIN HUMAN 4 UNITS: 100 INJECTION, SOLUTION PARENTERAL at 17:28

## 2024-01-16 RX ADMIN — FUROSEMIDE 80 MG: 10 INJECTION, SOLUTION INTRAMUSCULAR; INTRAVENOUS at 17:27

## 2024-01-16 RX ADMIN — THIAMINE HCL TAB 100 MG 100 MG: 100 TAB at 22:05

## 2024-01-16 NOTE — PROGRESS NOTES
"Pharmacy Consult-Vancomycin Dosing  Omkar Nava is a  68 y.o. male receiving vancomycin therapy.     Indication: pneumonia  Consulting Provider: hospitalist  ID Consult: No    Goal AUC: 400 - 600 mg/L*hr    Current Antimicrobial Therapy  Anti-Infectives (From admission, onward)      Ordered     Dose/Rate Route Frequency Start Stop    01/16/24 0828  vancomycin 1250 mg/250 mL 0.9% NS IVPB ([facility])        Ordering Provider: Renetta Beckford MD    1,250 mg  over 75 Minutes Intravenous Every 24 Hours 01/16/24 0915 01/20/24 0914    01/15/24 0356  cefepime 2 gm IVPB in 100 ml NS ([facility])        Ordering Provider: Alberto Oh MD    2,000 mg  over 4 Hours Intravenous Every 12 Hours 01/15/24 1200 01/20/24 1159    01/15/24 0356  Pharmacy to dose vancomycin        Ordering Provider: Alberto Oh MD     Does not apply Continuous PRN 01/15/24 0355 01/20/24 0354    01/15/24 0251  piperacillin-tazobactam (ZOSYN) 4.5 g in iso-osmotic dextrose 100 mL IVPB ([facility])        Ordering Provider: Farhana Valerio MD    4.5 g  over 30 Minutes Intravenous Once 01/15/24 0307 01/15/24 0435    01/15/24 0251  vancomycin 1750 mg/500 mL 0.9% NS IVPB ([facility])        Ordering Provider: Farhana Valerio MD    20 mg/kg × 93.4 kg  over 105 Minutes Intravenous Once 01/15/24 0307 01/15/24 0650            Allergies  Allergies as of 01/15/2024    (No Known Allergies)       Labs  Results from last 7 days   Lab Units 01/16/24  0709 01/15/24  1143 01/15/24  0614   BUN mg/dL 26* 33* 32*   CREATININE mg/dL 1.52* 1.60* 1.56*     Results from last 7 days   Lab Units 01/16/24  0709 01/15/24  0616 01/15/24  0118   WBC 10*3/mm3 5.90 8.26 8.26       Evaluation of Dosing  Last Dose Received in the ED/Outside Facility:               N/A  Is Patient on Dialysis or Renal Replacement:               No    Ht - 152.4 cm (60\")  Wt - 90.9 kg (200 lb 4.8 oz)    Estimated Creatinine Clearance: 44.9 mL/min (A) (by C-G formula based on SCr of 1.52 mg/dL (H)).  Intake & Output " (last 3 days)       None            Microbiology and Radiology  Microbiology Results (last 10 days)       Procedure Component Value - Date/Time    MRSA Screen, PCR (Inpatient) - Swab, Nares [[id number]]  (Abnormal) Collected: 01/15/24 0532    Lab Status: Final result Specimen: Swab from Nares Updated: 01/15/24 1226     MRSA PCR MRSA Detected    Narrative:      The negative predictive value of this diagnostic test is high and should only be used to consider de-escalating anti-MRSA therapy. A positive result may indicate colonization with MRSA and must be correlated clinically.    Legionella Antigen, Urine - Urine, Urine, Clean Catch [[id number]]  (Normal) Collected: 01/15/24 0250    Lab Status: Final result Specimen: Urine, Clean Catch Updated: 01/15/24 1102     LEGIONELLA ANTIGEN, URINE Negative    S. Pneumo Ag Urine or CSF - Urine, Urine, Clean Catch [[id number]]  (Normal) Collected: 01/15/24 0250    Lab Status: Final result Specimen: Urine, Clean Catch Updated: 01/15/24 1102     Strep Pneumo Ag Negative    Blood Culture - Blood, Arm, Left [[id number]]  (Normal) Collected: 01/15/24 0118    Lab Status: Preliminary result Specimen: Blood from Arm, Left Updated: 01/16/24 0246     Blood Culture No growth at 24 hours    Blood Culture - Blood, Arm, Right [[id number]]  (Normal) Collected: 01/15/24 0118    Lab Status: Preliminary result Specimen: Blood from Arm, Right Updated: 01/16/24 0246     Blood Culture No growth at 24 hours    Respiratory Panel PCR w/COVID-19(SARS-CoV-2) GIANNI/SIENNA/ANNA/PAD/COR/ASHIA In-House, NP Swab in UTM/VTM, 2 HR TAT - Swab, Nasopharynx [[id number]]  (Normal) Collected: 01/15/24 0115    Lab Status: Final result Specimen: Swab from Nasopharynx Updated: 01/15/24 0225     ADENOVIRUS, PCR Not Detected     Coronavirus 229E Not Detected     Coronavirus HKU1 Not Detected     Coronavirus NL63 Not Detected     Coronavirus OC43 Not Detected     COVID19 Not Detected     Human Metapneumovirus Not Detected     Human  Rhinovirus/Enterovirus Not Detected     Influenza A PCR Not Detected     Influenza B PCR Not Detected     Parainfluenza Virus 1 Not Detected     Parainfluenza Virus 2 Not Detected     Parainfluenza Virus 3 Not Detected     Parainfluenza Virus 4 Not Detected     RSV, PCR Not Detected     Bordetella pertussis pcr Not Detected     Bordetella parapertussis PCR Not Detected     Chlamydophila pneumoniae PCR Not Detected     Mycoplasma pneumo by PCR Not Detected    Narrative:      In the setting of a positive respiratory panel with a viral infection PLUS a negative procalcitonin without other underlying concern for bacterial infection, consider observing off antibiotics or discontinuation of antibiotics and continue supportive care. If the respiratory panel is positive for atypical bacterial infection (Bordetella pertussis, Chlamydophila pneumoniae, or Mycoplasma pneumoniae), consider antibiotic de-escalation to target atypical bacterial infection.            Reported Vancomycin Levels          Results from last 7 days   Lab Units 01/16/24  0709   VANCOMYCIN TR mcg/mL 8.60          [facility] AUC Calculation:  Current AUC:   -- mg/L*hr    Predicted Steady State AUC on Current Dose: 371 mg/L*hr  _________________________________  Predicted Steady State AUC on New Dose:   460 mg/L*hr    Assessment/Plan:  1. Pharmacy dosing vancomycin for pneumonia. Vancomycin 1750 mg IV loading dose given 1/15 ~0440.  2. Vancomycin level = 8.6 1/16 0709 with estimated AUCss of 371 mg/L*hr using vanc 1000 mg IV q24h.  3. Recommend to increase vancomycin to 1250 mg IV q24h to target -600.  4. Pharmacy will continue to monitor and adjust vancomycin dose as necessary based on renal function, cultures, labs, and clinical status.         Eduardo Hope, PharmD  1/16/2024  13:27 EST

## 2024-01-16 NOTE — THERAPY EVALUATION
Patient Name: Omkar Nava  : 1955    MRN: [id number]                              Today's Date: 2024       Admit Date: 1/15/2024    Visit Dx:     ICD-10-CM ICD-9-CM   1. Acute respiratory failure with hypoxia  J96.01 518.81   2. History of dementia  Z86.59 V11.8   3. 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, without long-term current use of insulin    Orthostatic hypotension    Acute osteomyelitis of left foot    Type 2 diabetes mellitus    Essential hypertension    Psychotic disorder    Neurocognitive disorder    S/P transmetatarsal amputation of foot, left    AMS (altered mental status)    Hypoxia    Abscess of left foot    Anemia of chronic disease    Aspiration pneumonia    Cellulitis of left toe    Cellulitis of lower extremity    Cervical spine pain    Chronic kidney disease    Closed head injury without loss of consciousness    Dementia    Dental cavities    Diarrhea of presumed infectious origin    Displaced fracture of proximal phalanx of left great toe, initial encounter for open fracture    Dysphagia    Erectile dysfunction    Fall    Gas gangrene    Generalized muscle weakness    Hallucinations    Hypertensive heart and chronic kidney disease 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 failure, with stage 1 through stage 4 chronic kidney disease, or unspecified chronic kidney disease    Insomnia due to medical condition    Iron deficiency anemia    Klebsiella pneumoniae (k. pneumoniae) as the cause of diseases classified elsewhere    Laceration without foreign body, left foot, subsequent encounter    Long term (current) use of insulin    Other acute osteomyelitis, left ankle and foot    Personal history of Methicillin resistant Staphylococcus aureus infection    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    Protein calorie malnutrition    Simple  chronic bronchitis    Tobacco use    Type 2 diabetes mellitus with diabetic neuropathy, unspecified    Wound infection, posttraumatic    Type 2 diabetes mellitus with diabetic chronic kidney disease     Past Medical History:   Diagnosis Date    Anemia     Dementia     Diabetes mellitus     Dysphagia     GERD (gastroesophageal reflux disease)     History of alcohol abuse     History of cocaine use     History of marijuana use     Hypertension     Osteomyelitis     Poor historian     records obtained from nursing home records & his family    Visual impairment      Past Surgical History:   Procedure Laterality Date    AMPUTATION FOOT Left 10/18/2022    Procedure: PARTIAL FIRST RAY AMPUTATION LEFT;  Surgeon: Yeison Petty MD;  Location:  [facility];  Service: Orthopedics;  Laterality: Left;    AMPUTATION FOOT Left 12/5/2022    Procedure: Transmetatarsal of Left Foot;  Surgeon: Yeison Petty MD;  Location:  [facility];  Service: Orthopedics;  Laterality: Left;    EYE SURGERY        General Information       Row Name 01/16/24 1414          OT Time and Intention    Document Type evaluation  -CHRISTI     Mode of Treatment occupational therapy  -CHRISTI       Row Name 01/16/24 1414          General Information    Patient Profile Reviewed yes  -CHRISTI     Prior Level of Function --  Pt. a poor historian.  Sister fed pt. lunch today.  Per pt. he gets up at SNF, but unclear if transfers.  -CHRISTI     Existing Precautions/Restrictions fall;other (see comments)  contact, h/o of transmetatarsal amputation LLE, posterior lean, pt. can only see shadows  -CHRISTI     Barriers to Rehab medically complex;previous functional deficit;cognitive status;visual deficit  -CHRISTI       Row Name 01/16/24 1414          Occupational Profile    Environmental Supports and Barriers (Occupational Profile) pt. unable to state, from SNF  -CHRISTI       Row Name 01/16/24 1414          Living Environment    People in Home facility resident  -CHRISTI       Row Name 01/16/24 1414          Home  Main Entrance    Number of Stairs, Main Entrance none  -CHRISTI       Row Name 01/16/24 1414          Stairs Within Home, Primary    Number of Stairs, Within Home, Primary none  -CHRISTI       Row Name 01/16/24 1414          Cognition    Orientation Status (Cognition) oriented to;person;disoriented to;place;situation;time;verbal cues/prompts needed for orientation  -CHRISTI       Row Name 01/16/24 1414          Safety Issues, Functional Mobility    Safety Issues Affecting Function (Mobility) awareness of need for assistance;insight into deficits/self-awareness;judgment;safety precaution awareness;safety precautions follow-through/compliance;sequencing abilities  -CHRISTI     Impairments Affecting Function (Mobility) balance;cognition;endurance/activity tolerance;postural/trunk control;range of motion (ROM);strength;visual/perceptual  -CHRISTI     Cognitive Impairments, Mobility Safety/Performance awareness, need for assistance;insight into deficits/self-awareness;judgment;safety precaution awareness;safety precaution follow-through;sequencing abilities  -CHRISTI               User Key  (r) = Recorded By, (t) = Taken By, (c) = Cosigned By      Initials Name Provider Type    Steffi Ennis, OT Occupational Therapist                     Mobility/ADL's       Row Name 01/16/24 1416          Bed Mobility    Bed Mobility supine-sit;scooting/bridging  -CHRISTI     Scooting/Bridging [place] (Bed Mobility) verbal cues;nonverbal cues (demo/gesture);minimum assist (75% patient effort)  to EOB  -CHRISTI     Supine-Sit [place] (Bed Mobility) minimum assist (75% patient effort);verbal cues;nonverbal cues (demo/gesture)  -CHRISTI     Bed Mobility, Safety Issues decreased use of legs for bridging/pushing  -CHRISTI     Assistive Device (Bed Mobility) bed rails;head of bed elevated  -CHRISTI     Comment, (Bed Mobility) pt. needed cues to initiate, assist to find railing, most assist with LE's  -CHRISTI       Row Name 01/16/24 1416          Transfers    Transfers bed-chair  transfer;sit-stand transfer;stand-sit transfer  -[facility] Name 01/16/24 1416          Bed-Chair Transfer    Bed-Chair [place] (Transfers) maximum assist (25% patient effort);2 person assist;verbal cues;nonverbal cues (demo/gesture)  -     Assistive Device (Bed-Chair Transfers) other (see comments)  -CHRISTI     Comment, (Bed-Chair Transfer) BUE support, pt. with posterior lean  -CHRISTI       Row Name 01/16/24 1416          Sit-Stand Transfer    Sit-Stand [place] (Transfers) moderate assist (50% patient effort);2 person assist;verbal cues;nonverbal cues (demo/gesture)  -     Assistive Device (Sit-Stand Transfers) other (see comments)  -CHRISTI     Comment, (Sit-Stand Transfer) BUE support, cues for positioning body needed and to wait for assist  -CHRISTI       Row Name 01/16/24 1416          Stand-Sit Transfer    Stand-Sit [place] (Transfers) maximum assist (25% patient effort);2 person assist;verbal cues  -     Assistive Device (Stand-Sit Transfers) other (see comments)  -CHRISTI     Comment, (Stand-Sit Transfer) BUE support  -CHRISTI       Row Name 01/16/24 [facility]          Functional Mobility    Functional Mobility- Ind. Level unable to perform  -CHRISTI       Row Name 01/16/24 [facility]          Activities of Daily Living    BADL Assessment/Intervention lower body dressing;grooming;feeding  -CHRISTI       Row Name 01/16/24 [facility]          Lower Body Dressing Assessment/Training    [place] Level (Lower Body Dressing) don;socks;dependent (less than 25% patient effort)  -     Position (Lower Body Dressing) supine  -CHRISTI       Row Name 01/16/24 1416          Grooming Assessment/Training    [place] Level (Grooming) wash face, hands;standby assist;verbal cues;set up  -CHRISTI     Position (Grooming) supported sitting  -CHRISTI       Row Name 01/16/24 1416          Self-Feeding Assessment/Training    [place] Level (Feeding) prepare tray/open items;dependent (less than 25% patient effort);scoop food and bring to mouth;moderate assist (50%  patient effort)  -     Position (Self-Feeding) supported sitting  -     Comment, (Feeding) Pt. difficulty keeping utensil in hand due to limited hand ROM.  At first pt. scooping, but bringin spoon to chin or under chin and assist to find mouth.  By last 4-5 bites pt. able to scoop and bring to mouth with assist only to turn cup in hand to get all of food from container.  Discussed benefit of built up spoon with pt. and nurse.  -               User Key  (r) = Recorded By, (t) = Taken By, (c) = Cosigned By      Initials Name Provider Type    Steffi Ennis, OT Occupational Therapist                   Obj/Interventions       Row Name 01/16/24 1421          Sensory Assessment (Somatosensory)    Sensory Assessment (Somatosensory) UE sensation intact  -     Sensory Assessment per pt. report only, not formally assesss due to cognition, question mild deficit due to spoon droppage  -       Row Name 01/16/24 1421          Vision Assessment/Intervention    Visual Impairment/Limitations other (see comments)  per chart and pt. report pt. only sees shadows  -     Vision Assessment Comment sticky dot placed to pt. call bell to assist pt. with calling out, Hand over hand given to initially find dot on call bell  -       Row Name 01/16/24 1421          Range of Motion Comprehensive    General Range of Motion upper extremity range of motion deficits identified  -     Comment, General Range of Motion B shoulder limited grossly 50%  -       Row Name 01/16/24 1421          Strength Comprehensive (MMT)    General Manual Muscle Testing (MMT) Assessment no strength deficits identified  -     Comment, General Manual Muscle Testing (MMT) Assessment BUE, see PT not for LE, appears with core weakness  -       Row Name 01/16/24 1421          Motor Skills    Motor Skills functional endurance  -       Row Name 01/16/24 1421          Balance    Balance Assessment sitting static balance;sitting dynamic balance;standing  static balance;standing dynamic balance  -CHRISTI     Static Sitting Balance minimal assist  up to CGA, some LUE support on railing, but without back support, tends to lean posteriorly  -CHRISTI     Dynamic Sitting Balance minimal assist  -CHRISTI     Position, Sitting Balance unsupported;sitting edge of bed  -CHRISTI     Static Standing Balance moderate assist;2-person assist;verbal cues;non-verbal cues (demo/gesture)  -CHRISTI     Dynamic Standing Balance maximum assist;2-person assist;verbal cues;non-verbal cues (demo/gesture)  -CHRISTI     Balance Interventions sit to stand  -CHRISTI     Comment, Balance posterior lean sitting and standing, more severe in standing  -CHRISTI               User Key  (r) = Recorded By, (t) = Taken By, (c) = Cosigned By      Initials Name Provider Type    Steffi Ennis, OT Occupational Therapist                   Goals/Plan       Row Name 01/16/24 1431          Transfer Goal 1 (OT)    Activity/Assistive Device (Transfer Goal 1, OT) sit-to-stand/stand-to-sit;toilet;commode, bedside without drop arms  -CHRISTI     [place] Level/Cues Needed (Transfer Goal 1, OT) moderate assist (50-74% patient effort);tactile cues required;verbal cues required  -CHRISTI     Time Frame (Transfer Goal 1, OT) long term goal (LTG);10 days  -CHRISTI     Progress/Outcome (Transfer Goal 1, OT) new goal;goal ongoing  -CHRISTI       Row Name 01/16/24 1431          Grooming Goal 1 (OT)    Activity/Device (Grooming Goal 1, OT) oral care  -CHRISTI     [place] (Grooming Goal 1, OT) minimum assist (75% or more patient effort);set-up required;tactile cues required;verbal cues required  -CHRISTI     Time Frame (Grooming Goal 1, OT) long term goal (LTG);10 days  -CHRISTI     Progress/Outcome (Grooming Goal 1, OT) new goal  -CHRISTI       Row Name 01/16/24 1431          Self-Feeding Goal 1 (OT)    Activity/Device (Self-Feeding Goal 1, OT) built-up handle utensils;scoop dish/plate guard  -CHRISTI     [place] Level/Cues Needed (Self-Feeding Goal 1, OT) minimum assist (75% or more  patient effort);set-up required  -CHRISTI     Time Frame (Self-Feeding Goal 1, OT) long term goal (LTG);10 days  -CHRISTI     Progress/Outcomes (Self-Feeding Goal 1, OT) goal ongoing;new goal  -CHRISTI       Row Name 01/16/24 1431          Problem Specific Goal 1 (OT)    Problem Specific Goal 1 (OT) Pt. will stand with min of 2 for one minute to support ADL independence and caregiver care.  -CHRISTI     Time Frame (Problem Specific Goal 1, OT) long term goal (LTG);10 days  -CHRISTI     Progress/Outcome (Problem Specific Goal 1, OT) new goal;goal ongoing  -CHRISTI       Row Name 01/16/24 1431          Therapy Assessment/Plan (OT)    Planned Therapy Interventions (OT) activity tolerance training;adaptive equipment training;BADL retraining;cognitive/visual perception retraining;functional balance retraining;occupation/activity based interventions;patient/caregiver education/training;ROM/therapeutic exercise;strengthening exercise;transfer/mobility retraining  -               User Key  (r) = Recorded By, (t) = Taken By, (c) = Cosigned By      Initials Name Provider Type    Steffi Ennis, OT Occupational Therapist                   Clinical Impression       Row Name 01/16/24 1426          Pain Assessment    Pretreatment Pain Rating 0/10 - no pain  -CHRISTI     Posttreatment Pain Rating 0/10 - no pain  -CHRISTI       Row Name 01/16/24 1421          Plan of Care Review    Plan of Care Reviewed With patient  -CHRISTI     Progress no change  -CHRISTI     Outcome Evaluation Patient presents with impaired cognition, balance, activity tolerance, coordination, ROM and strength along with possible sensory loss impacting ADL independence and related transfers.  Pt. unable to report PLOF.  Pt. appears with STM loss already forgetting that he ate lunch or what ate.  Pt. oriented only to person.  Per chart and pt. he can only see shadows.  Touch dot placed on call bell nurse alert button in attempt to help pt. call out for assist.  Pt. will benefit from built up utensil to  assist with self feeding.   Pt. appropriate for continued skilled IP OT services to address deficit areas and promote return to PLOF.  Recommend return  to SNF at discharge if pt. not at baseline ADL and transfer independence level.  -       Row Name 01/16/24 1422          Therapy Assessment/Plan (OT)    Patient/Family Therapy Goal Statement (OT) pt. unable to state  -     Rehab Potential (OT) fair, will monitor progress closely  -     Criteria for Skilled Therapeutic Interventions Met (OT) yes;meets criteria;skilled treatment is necessary  -     Therapy Frequency (OT) daily  -       Row Name 01/16/24 1425          Therapy Plan Review/Discharge Plan (OT)    Anticipated Discharge Disposition (OT) [facility] nursing facility  -       Row Name 01/16/24 1424          Vital Signs    Pre Systolic BP Rehab 160  -CHRISTI     Pre Treatment Diastolic BP 65  -CHRISTI     Intra Systolic BP Rehab 163  -CHRISTI     Intra Treatment Diastolic BP 76  -CHRISTI     Post Systolic BP Rehab 174  -CHRISTI     Post Treatment Diastolic BP 75  -CHRISTI     Posttreatment Heart Rate (beats/min) 69  -CHRISTI     Pre SpO2 (%) 98  -CHRISTI     O2 Delivery Pre Treatment nasal cannula  -CHRISTI     Intra SpO2 (%) --  88-92  -CHRISTI     O2 Delivery Intra Treatment nasal cannula  -CHRISTI     Post SpO2 (%) 95  -CHRISTI     O2 Delivery Post Treatment nasal cannula  -CHRISTI     Pre Patient Position Supine  -CHRISTI     Intra Patient Position Sitting  -CHRISTI     Post Patient Position Sitting  -       Row Name 01/16/24 1429          Positioning and Restraints    Pre-Treatment Position in bed  -CHRISTI     Post Treatment Position chair  -CHRISTI     In Chair reclined;call light within reach;encouraged to call for assist;exit alarm on;waffle cushion;on mechanical lift sling;with nsg  -CHRISTI               User Key  (r) = Recorded By, (t) = Taken By, (c) = Cosigned By      Initials Name Provider Type    Steffi Ennis, OT Occupational Therapist                   Outcome Measures       Row Name 01/16/24 8754          How much  help from another is currently needed...    Putting on and taking off regular lower body clothing? 1  -CHRISTI     Bathing (including washing, rinsing, and drying) 1  -CHRISTI     Toileting (which includes using toilet bed pan or urinal) 1  -CHRISTI     Putting on and taking off regular upper body clothing 2  -CHRISTI     Taking care of personal grooming (such as brushing teeth) 2  -CHRISTI     Eating meals 2  -CHRISTI     AM-PAC 6 Clicks Score (OT) 9  -CHRISTI       Row Name 01/16/24 0800          How much help from another person do you currently need...    Turning from your back to your side while in flat bed without using bedrails? 3  -BW     Moving from lying on back to sitting on the side of a flat bed without bedrails? 3  -BW     Moving to and from a bed to a chair (including a wheelchair)? 3  -BW     Standing up from a chair using your arms (e.g., wheelchair, bedside chair)? 3  -BW     Climbing 3-5 steps with a railing? 2  -BW     To walk in hospital room? 3  -BW     AM-PAC 6 Clicks Score (PT) 17  -BW     Highest Level of Mobility Goal 5 --> Static standing  -BW       Row Name 01/16/24 1432          Functional Assessment    Outcome Measure Options AM-PAC 6 Clicks Daily Activity (OT)  -CHRISTI               User Key  (r) = Recorded By, (t) = Taken By, (c) = Cosigned By      Initials Name Provider Type    Steffi Ennis, SHIRAZ Occupational Therapist    Antolin Fields, RN Registered Nurse                    Occupational Therapy Education       Title: PT OT SLP Therapies (In Progress)       Topic: Occupational Therapy (In Progress)       Point: ADL training (In Progress)       Description:   Instruct learner(s) on proper safety adaptation and remediation techniques during self care or transfers.   Instruct in proper use of assistive devices.                  Learning Progress Summary             Patient Acceptance, E, NR by CHRISTI at 1/16/2024 1433    Comment: re-orientation, noted deficits, bed mobility and transfer safety, built up utensil  benefit for self feeding                         Point: Home exercise program (Not Started)       Description:   Instruct learner(s) on appropriate technique for monitoring, assisting and/or progressing therapeutic exercises/activities.                  Learner Progress:  Not documented in this visit.              Point: Precautions (In Progress)       Description:   Instruct learner(s) on prescribed precautions during self-care and functional transfers.                  Learning Progress Summary             Patient Acceptance, E, NR by CHRISTI at 1/16/2024 1433    Comment: re-orientation, noted deficits, bed mobility and transfer safety, built up utensil benefit for self feeding                         Point: Body mechanics (In Progress)       Description:   Instruct learner(s) on proper positioning and spine alignment during self-care, functional mobility activities and/or exercises.                  Learning Progress Summary             Patient Acceptance, E, NR by CHRISTI at 1/16/2024 1433    Comment: re-orientation, noted deficits, bed mobility and transfer safety, built up utensil benefit for self feeding                                         User Key       Initials Effective Dates Name Provider Type Discipline    CHRISTI 07/11/23 -  Steffi Carpenter, OT Occupational Therapist OT                  OT Recommendation and Plan  Planned Therapy Interventions (OT): activity tolerance training, adaptive equipment training, BADL retraining, cognitive/visual perception retraining, functional balance retraining, occupation/activity based interventions, patient/caregiver education/training, ROM/therapeutic exercise, strengthening exercise, transfer/mobility retraining  Therapy Frequency (OT): daily  Plan of Care Review  Plan of Care Reviewed With: patient  Progress: no change  Outcome Evaluation: Patient presents with impaired cognition, balance, activity tolerance, coordination, ROM and strength along with possible sensory loss  impacting ADL independence and related transfers.  Pt. unable to report PLOF.  Pt. appears with STM loss already forgetting that he ate lunch or what ate.  Pt. oriented only to person.  Per chart and pt. he can only see shadows.  Touch dot placed on call bell nurse alert button in attempt to help pt. call out for assist.  Pt. will benefit from built up utensil to assist with self feeding.   Pt. appropriate for continued skilled IP OT services to address deficit areas and promote return to PLOF.  Recommend return  to SNF at discharge if pt. not at baseline ADL and transfer independence level.     Time Calculation:   Evaluation Complexity (OT)  Review Occupational Profile/Medical/Therapy History Complexity: expanded/moderate complexity  Assessment, Occupational Performance/Identification of Deficit Complexity: 3-5 performance deficits  Clinical Decision Making Complexity (OT): detailed assessment/moderate complexity  Overall Complexity of Evaluation (OT): moderate complexity     Time Calculation- OT       Row Name 01/16/24 1434             Time Calculation- OT    OT Start Time 1342  -CHRISTI      OT Received On 01/16/24  -CHRISTI      OT Goal Re-Cert Due Date 01/26/24  -CHRISTI         Timed Charges    67883 - OT Therapeutic Activity Minutes 3  -CHRISTI      36957 - OT Self Care/Mgmt Minutes 10  -CHRISTI         Untimed Charges    OT Eval/Re-eval Minutes 36  -CHRISTI         Total Minutes    Timed Charges Total Minutes 13  -CHRISTI      Untimed Charges Total Minutes 36  -CHRISTI       Total Minutes 49  -CHRISTI                User Key  (r) = Recorded By, (t) = Taken By, (c) = Cosigned By      Initials Name Provider Type    Steffi Ennis, OT Occupational Therapist                  Therapy Charges for Today       Code Description Service Date Service Provider Modifiers Qty    [id number]  OT SELF CARE/MGMT/TRAIN EA 15 MIN 1/16/2024 Steffi Carpenter OT GO 1    [id number]  OT EVAL MOD COMPLEXITY 3 1/16/2024 Steffi Carpenter OT GO 1                 Steffi  Toma Carpenter, OT  1/16/2024

## 2024-01-16 NOTE — NURSING NOTE
"PT A/O x 1, chair alarm set, on 2 L NC. BP has been hypertensive today, provider ordered Norvasc 5 mg. He received 1 U PRBC's this morning, follow up H/H pending. Denies wanting medication for back pain, refused anxiety meds even though he reported \"being anxious.\" PT has no needs at this time, call light in reach  "

## 2024-01-16 NOTE — CASE MANAGEMENT/SOCIAL WORK
Discharge Planning Assessment  [facility]     Patient Name: Omkar Nava  MRN: [id number]  Today's Date: 1/16/2024    Admit Date: 1/15/2024    Plan: LTC   Discharge Needs Assessment    No documentation.                  Discharge Plan       Row Name 01/16/24 1436       Plan    Plan LTC    Patient/Family in Agreement with Plan yes    Plan Comments Spoke to patient's daughterIdalia to initiate discharge planning. Mr. Nava is dependent with ADL's and scoots himself with a wheelchair. He has a rolling walker & cane. No home oxygen. Spoke to Keyla with [facility] ([phone]). Patient is in a LTC bed, but does not have a bed hold. His long-term medicaid is pending. Per Taryn, if they have a bed available when medically ready, they will take him back. Daughter Idalia to call and discuss bed hold with [facility]. Verified he has Medicare A&B. His PCP is Alberto Dye. His plan is to return to [facility], pending bed availability. CM will continue to follow.    Final Discharge Disposition Code 04 - intermediate care facility                  Continued Care and Services - Admitted Since 1/15/2024       Destination       Service Provider Request Status Selected Services Address Phone Fax Patient Preferred    [facility] - No [facility] N[facility]A [id number] [facility] [id number] [phone] --       Internal Comment last updated by Ying Fallon, RN 1/16/2024 1435    Does not have a bed hold                             Expected Discharge Date and Time       Expected Discharge Date Expected Discharge Time    Jan 19, 2024            Demographic Summary    No documentation.                  Functional Status    No documentation.                  Psychosocial    No documentation.                  Abuse/Neglect    No documentation.                  Legal    No documentation.                  Substance Abuse    No documentation.                  Patient Forms    No  documentation.                     Ying Fallon RN

## 2024-01-16 NOTE — PLAN OF CARE
Goal Outcome Evaluation:  Plan of Care Reviewed With: patient        Progress: no change  Outcome Evaluation: PT eval completed. Patient presents w/ decreased strength, impaired balance and coordination, signficant gait instability, decreased safety awareness, and is below his baseline. He required assist of 2 for transfers and taking steps to chair. Skilled IPPT warranted to improve pt's safety and independence w/ functional mobility. Recommend SNF at D/C.      Anticipated Discharge Disposition (PT): skilled nursing facility

## 2024-01-16 NOTE — PLAN OF CARE
Goal Outcome Evaluation:  Plan of Care Reviewed With: patient        Progress: improving  Outcome Evaluation: VSS. Decreased O2 to 2L NC. A/Ox1. NSR on tele. IV ABX administered per MAR. no complaints of pain. Adequate urine output for shift.

## 2024-01-16 NOTE — PLAN OF CARE
Goal Outcome Evaluation:  Plan of Care Reviewed With: patient        Progress: no change  Outcome Evaluation: Patient presents with impaired cognition, balance, activity tolerance, coordination, ROM and strength along with possible sensory loss impacting ADL independence and related transfers.  Pt. unable to report PLOF.  Pt. appears with STM loss already forgetting that he ate lunch or what ate.  Pt. oriented only to person.  Per chart and pt. he can only see shadows.  Touch dot placed on call bell nurse alert button in attempt to help pt. call out for assist.  Pt. will benefit from built up utensil to assist with self feeding.   Pt. appropriate for continued skilled IP OT services to address deficit areas and promote return to PLOF.  Recommend return  to SNF at discharge if pt. not at baseline ADL and transfer independence level.      Anticipated Discharge Disposition (OT): skilled nursing facility

## 2024-01-16 NOTE — PROGRESS NOTES
[facility] Medicine Services  PROGRESS NOTE    Patient Name: Omkar Nava  : 1955  MRN: [id number]    Date of Admission: 1/15/2024  Primary Care Physician: Deann Cao MD    Subjective   Subjective     CC:  F/u dyspnea    HPI:  Getting echo, reports no complaints. Not very talkative, confused to place and time      Objective   Objective     Vital Signs:   Temp:  [97.8 °F (36.6 °C)-99.2 °F (37.3 °C)] 98.5 °F (36.9 °C)  Heart Rate:  [63-81] 68  Resp:  [16-20] 20  BP: (142-182)/(50-84) 160/65  Flow (L/min):  [2-6] 3     Physical Exam:  Constitutional: elderly, resting comfortably, chronically ill appearing  HENT: NCAT, mucous membranes moist  Respiratory:bibasilar crackles, 3L NC  Cardiovascular: RRR, no murmurs, rubs, or gallops  Gastrointestinal: Positive bowel sounds, soft, nontender, nondistended  Musculoskeletal: 1+ bilateral ankle edema  Psychiatric: Appropriate affect, cooperative  Neurologic: Oriented to person only (daughter states that this is baseline), moves extremities spontaneously, no focal deficits  Skin: No rashes      Results Reviewed:  LAB RESULTS:      Lab 24  0709 01/15/24  0616 01/15/24  0325 01/15/24  0118   WBC 5.90 8.26  --  8.26   HEMOGLOBIN 6.4* 7.1*  --  7.6*   HEMATOCRIT 20.7* 22.3*  --  25.0*   PLATELETS 152 153  --  155   NEUTROS ABS 3.39  --   --  6.42   IMMATURE GRANS (ABS) 0.02  --   --  0.03   LYMPHS ABS 1.50  --   --  0.79   MONOS ABS 0.82  --   --  0.85   EOS ABS 0.15  --   --  0.15   MCV 94.5 94.9  --  98.0*   PROCALCITONIN  --   --  0.26*  --    LACTATE  --   --   --  1.6   D DIMER QUANT  --   --   --  0.83*         Lab 24  0709 01/15/24  1143 01/15/24  0614 01/15/24  0118   SODIUM 143 144 140 141   POTASSIUM 4.1 4.8 5.1 5.2   CHLORIDE 107 109* 107 108*   CO2 26.0 25.0 25.0 24.0   ANION GAP 10.0 10.0 8.0 9.0   BUN 26* 33* 32* 33*   CREATININE 1.52* 1.60* 1.56* 1.64*   EGFR 50.2* 47.2* 48.7* 45.8*   GLUCOSE 94 186* 245*  304*   CALCIUM 8.4* 8.7 8.4* 8.7   MAGNESIUM 2.0  --  2.4  --          Lab 01/15/24  0118   TOTAL PROTEIN 7.0   ALBUMIN 3.5   GLOBULIN 3.5   ALT (SGPT) 47*   AST (SGOT) 32   BILIRUBIN 0.2   ALK PHOS 110         Lab 01/15/24  0614 01/15/24  0325 01/15/24  0118   PROBNP  --   --  905.7*   HSTROP T 73* 97* 83*             Lab 01/16/24  0833 01/15/24  0614   IRON  --  17*   IRON SATURATION (TSAT)  --  6*   TIBC  --  289*   TRANSFERRIN  --  194*   FERRITIN  --  312.80   ABO TYPING O  --    RH TYPING Positive  --    ANTIBODY SCREEN Negative  --          Lab 01/15/24  0204   PH, ARTERIAL 7.366   PCO2, ARTERIAL 43.2   PO2 ART 87.8   FIO2 45   HCO3 ART 24.7   BASE EXCESS ART -0.7*   CARBOXYHEMOGLOBIN 1.9     Brief Urine Lab Results  (Last result in the past 365 days)        Color   Clarity   Blood   Leuk Est   Nitrite   Protein   CREAT   Urine HCG        08/02/23 1319 Yellow   Clear   Negative   Negative   Negative   30 mg/dL (1+)                   Microbiology Results Abnormal       Procedure Component Value - Date/Time    Blood Culture - Blood, Arm, Left [[id number]]  (Normal) Collected: 01/15/24 0118    Lab Status: Preliminary result Specimen: Blood from Arm, Left Updated: 01/16/24 0246     Blood Culture No growth at 24 hours    Blood Culture - Blood, Arm, Right [[id number]]  (Normal) Collected: 01/15/24 0118    Lab Status: Preliminary result Specimen: Blood from Arm, Right Updated: 01/16/24 0246     Blood Culture No growth at 24 hours    S. Pneumo Ag Urine or CSF - Urine, Urine, Clean Catch [[id number]]  (Normal) Collected: 01/15/24 0250    Lab Status: Final result Specimen: Urine, Clean Catch Updated: 01/15/24 1102     Strep Pneumo Ag Negative    Legionella Antigen, Urine - Urine, Urine, Clean Catch [[id number]]  (Normal) Collected: 01/15/24 0250    Lab Status: Final result Specimen: Urine, Clean Catch Updated: 01/15/24 1102     LEGIONELLA ANTIGEN, URINE Negative    Respiratory Panel PCR w/COVID-19(SARS-CoV-2)  GIANNI/SIENNA/ANNA/PAD/COR/ASHIA In-House, NP Swab in UTM/VTM, 2 HR TAT - Swab, Nasopharynx [[id number]]  (Normal) Collected: 01/15/24 0115    Lab Status: Final result Specimen: Swab from Nasopharynx Updated: 01/15/24 0225     ADENOVIRUS, PCR Not Detected     Coronavirus 229E Not Detected     Coronavirus HKU1 Not Detected     Coronavirus NL63 Not Detected     Coronavirus OC43 Not Detected     COVID19 Not Detected     Human Metapneumovirus Not Detected     Human Rhinovirus/Enterovirus Not Detected     Influenza A PCR Not Detected     Influenza B PCR Not Detected     Parainfluenza Virus 1 Not Detected     Parainfluenza Virus 2 Not Detected     Parainfluenza Virus 3 Not Detected     Parainfluenza Virus 4 Not Detected     RSV, PCR Not Detected     Bordetella pertussis pcr Not Detected     Bordetella parapertussis PCR Not Detected     Chlamydophila pneumoniae PCR Not Detected     Mycoplasma pneumo by PCR Not Detected    Narrative:      In the setting of a positive respiratory panel with a viral infection PLUS a negative procalcitonin without other underlying concern for bacterial infection, consider observing off antibiotics or discontinuation of antibiotics and continue supportive care. If the respiratory panel is positive for atypical bacterial infection (Bordetella pertussis, Chlamydophila pneumoniae, or Mycoplasma pneumoniae), consider antibiotic de-escalation to target atypical bacterial infection.            XR Abdomen KUB    Result Date: 1/16/2024  XR ABDOMEN KUB Date of Exam: 1/16/2024 10:33 AM EST Indication: guarding on exam, concern for ileus/obstruction Comparison: CT abdomen pelvis 7/31/2023. Findings: Nonobstructive bowel gas pattern. Large volume of formed stool throughout the colon.     Impression: Impression: No evidence of bowel obstruction. Large stool burden, suggestive of constipation. Electronically Signed: Anjum Prado MD  1/16/2024 11:15 AM EST  Workstation ID: [id number]    XR Chest 1 View    Result Date:  1/15/2024  XR CHEST 1 VW Date of Exam: 1/15/2024 2:05 AM EST Indication: SOA triage protocol Comparison: Chest radiograph July 31, 2023 Findings: The heart size normal. There are diffuse bilateral alveolar and interstitial airspace opacities greater on the right than the left. Findings are most likely secondary to multifocal pneumonia. Differential would include asymmetric pulmonary edema.     Impression: Impression: Multifocal alveolar and interstitial opacities favors pneumonia. Electronically Signed: Boby Riggs MD  1/15/2024 2:36 AM EST  Workstation ID: [id number]         Current medications:  Scheduled Meds:amLODIPine, 5 mg, Oral, Q24H  atorvastatin, 20 mg, Oral, Nightly  brimonidine, 1 drop, Both Eyes, BID   And  timolol, 1 drop, Both Eyes, BID  calcium gluconate, 2,000 mg, Intravenous, Once  carvedilol, 25 mg, Oral, BID With Meals  cefepime, 2,000 mg, Intravenous, Q12H  cholecalciferol, 6,000 Units, Oral, Daily  divalproex, 375 mg, Oral, BID  famotidine, 20 mg, Oral, BID  ferrous sulfate, 325 mg, Oral, Daily With Breakfast  FLUoxetine, 20 mg, Oral, BID  furosemide, 80 mg, Intravenous, BID  heparin (porcine), 5,000 Units, Subcutaneous, Q8H  [Held by provider] hydrALAZINE, 75 mg, Oral, Q8H  insulin regular, 2-7 Units, Subcutaneous, Q6H  melatonin, 5 mg, Oral, Nightly  senna-docusate sodium, 2 tablet, Oral, BID  sodium chloride, 10 mL, Intravenous, Q12H  thiamine, 100 mg, Oral, TID  traZODone, 25 mg, Oral, Nightly  vancomycin, 1,250 mg, Intravenous, Q24H      Continuous Infusions:Pharmacy to dose vancomycin,       PRN Meds:.  acetaminophen    albuterol    senna-docusate sodium **AND** polyethylene glycol **AND** bisacodyl **AND** bisacodyl    Calcium Replacement - Follow Nurse / BPA Driven Protocol    dextrose    dextrose    glucagon (human recombinant)    hydrOXYzine    Magnesium Standard Dose Replacement - Follow Nurse / BPA Driven Protocol    Pharmacy to dose vancomycin    Phosphorus Replacement - Follow Nurse  / BPA Driven Protocol    Potassium Replacement - Follow Nurse / BPA Driven Protocol    sodium chloride    sodium chloride    sodium chloride    sodium chloride    Assessment & Plan   Assessment & Plan     Active Hospital Problems    Diagnosis  POA    **Hypoxia [R09.02]  Yes    Aspiration pneumonia [J69.0]  Yes    Dementia [F03.90]  Yes    S/P transmetatarsal amputation of foot, left [Z89.432]  Not Applicable    Anemia of chronic disease [D63.8]  Yes    Personal history of Methicillin resistant Staphylococcus aureus infection [Z86.14]  Yes    Neurocognitive disorder [R41.9]  Yes    Type 2 diabetes mellitus [E11.9]  Yes    Essential hypertension [I10]  Yes    Psychotic disorder [F29]  Yes    Type 2 diabetes mellitus with diabetic neuropathy, unspecified [E11.40]  Yes    Type 2 diabetes mellitus with diabetic chronic kidney disease [E11.22]  Yes      Resolved Hospital Problems   No resolved problems to display.        Brief Hospital Course to date:  Omkar Nava is a 68 y.o. male with history of chronic systolic heart failure, type 2 diabetes on insulin, anemia, hypertension, CKD, history of osteomyelitis status post left TMA, dementia presenting with acute hypoxemic respiratory failure.  He was admitted to hospitalist service with concern for multifocal pneumonia    Acute on chronic hypoxemic respiratory failure secondary to,  Multifocal pneumonia with risk factors for healthcare associated infection  Acute on chronic exacerbation of systolic heart failure  -initially required HFNC, now on NC and seems relatively stable  -- Chest x-ray shows multifocal opacities greater on right than left concerning for multifocal pneumonia versus asymmetric pulmonary edema.    --placed on vanc, cefepime doxy for concern of healthcare acquired PNA w recent facility stay. Resp panel neg, legionella and strep neg. MRSA detected. Blood cultures in process  --Holding home torsemide and switched to 80 mg Lasix IV twice daily, on heart  failure pathway.    -Echo in process  -Continue home carvedilol.  He no longer appears to be taking SGLT2 or ACE inhibitor.  Will see where creatinine settles out, if improving can optimize guideline directed medical therapy     Acute kidney injury on CKD 3  -Baseline creatinine appears to be 1.3, he is 1.64 on admission and trended to 1.5 overnight.  Suspect prerenal secondary to fluid retention, can be a component of cardiorenal as it has improved with diuresis     Elevated serum troponin level  -Troponin 83 on admission without ischemic changes on EKG or complaints of chest pain.  Trending down.  Suspect demand ischemia secondary to lung pathology and worsened by CKD     Anemia of chronic disease  -Baseline hemoglobin around 8, he is 7.6 on admission.  No signs of active bleeding.  iron is low, panel overall consistent w AOCD. Given new O2 requirement will go ahead and transfuse one unit for hgb <7 this AM. Would benefit from IV iron before dc after acute infection issues     Hypertension  -added back amlodipine and coreg from home regimen, hold torsemide as he is receiving diuresis IV     Type 2 diabetes  -Uses 6 units of insulin glargine at skilled nursing facility.  continue SSI for now     History of dementia with behavioral disturbance  Insomnia  -Continue home medications  --complicates all aspects of care, high risk of hospital delirium    Updated daughter by phone today    Expected Discharge Location and Transportation: back to [facility]  Expected Discharge   Expected Discharge Date: 1/19/2024; Expected Discharge Time:      DVT prophylaxis:  Medical DVT prophylaxis orders are present.     AM-PAC 6 Clicks Score (PT): 17 (01/16/24 0800)    CODE STATUS:   Code Status and Medical Interventions:   Ordered at: 01/15/24 1066     Code Status (Patient has no pulse and is not breathing):    CPR (Attempt to Resuscitate)     Medical Interventions (Patient has pulse or is breathing):    Full Support       Renetta REED  MD Shakeel  01/16/24

## 2024-01-16 NOTE — THERAPY EVALUATION
Patient Name: Omkar Nava  : 1955    MRN: [id number]                              Today's Date: 2024       Admit Date: 1/15/2024    Visit Dx:     ICD-10-CM ICD-9-CM   1. Acute respiratory failure with hypoxia  J96.01 518.81   2. History of dementia  Z86.59 V11.8   3. 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, without long-term current use of insulin    Orthostatic hypotension    Acute osteomyelitis of left foot    Type 2 diabetes mellitus    Essential hypertension    Psychotic disorder    Neurocognitive disorder    S/P transmetatarsal amputation of foot, left    AMS (altered mental status)    Hypoxia    Abscess of left foot    Anemia of chronic disease    Aspiration pneumonia    Cellulitis of left toe    Cellulitis of lower extremity    Cervical spine pain    Chronic kidney disease    Closed head injury without loss of consciousness    Dementia    Dental cavities    Diarrhea of presumed infectious origin    Displaced fracture of proximal phalanx of left great toe, initial encounter for open fracture    Dysphagia    Erectile dysfunction    Fall    Gas gangrene    Generalized muscle weakness    Hallucinations    Hypertensive heart and chronic kidney disease 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 failure, with stage 1 through stage 4 chronic kidney disease, or unspecified chronic kidney disease    Insomnia due to medical condition    Iron deficiency anemia    Klebsiella pneumoniae (k. pneumoniae) as the cause of diseases classified elsewhere    Laceration without foreign body, left foot, subsequent encounter    Long term (current) use of insulin    Other acute osteomyelitis, left ankle and foot    Personal history of Methicillin resistant Staphylococcus aureus infection    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    Protein calorie malnutrition    Simple  chronic bronchitis    Tobacco use    Type 2 diabetes mellitus with diabetic neuropathy, unspecified    Wound infection, posttraumatic    Type 2 diabetes mellitus with diabetic chronic kidney disease     Past Medical History:   Diagnosis Date    Anemia     Dementia     Diabetes mellitus     Dysphagia     GERD (gastroesophageal reflux disease)     History of alcohol abuse     History of cocaine use     History of marijuana use     Hypertension     Osteomyelitis     Poor historian     records obtained from nursing home records & his family    Visual impairment      Past Surgical History:   Procedure Laterality Date    AMPUTATION FOOT Left 10/18/2022    Procedure: PARTIAL FIRST RAY AMPUTATION LEFT;  Surgeon: Yeison Petty MD;  Location:  [facility];  Service: Orthopedics;  Laterality: Left;    AMPUTATION FOOT Left 12/5/2022    Procedure: Transmetatarsal of Left Foot;  Surgeon: Yeison Petty MD;  Location:  [facility];  Service: Orthopedics;  Laterality: Left;    EYE SURGERY        General Information       Row Name 01/16/24 1420          Physical Therapy Time and Intention    Document Type evaluation  -MB     Mode of Treatment physical therapy  -MB       Row Name 01/16/24 1420          General Information    Patient Profile Reviewed yes  -MB     Prior Level of Function --  TBD. Pt. limited historian.  -MB     Existing Precautions/Restrictions fall;other (see comments)  remote L transmetatarsal amputation, legally blind  -MB     Barriers to Rehab medically complex;previous functional deficit;cognitive status;visual deficit  -MB       Row Name 01/16/24 1420          Living Environment    People in Home facility resident  -MB       Row Name 01/16/24 1420          Home Main Entrance    Number of Stairs, Main Entrance none  -MB       Row Name 01/16/24 1420          Stairs Within Home, Primary    Number of Stairs, Within Home, Primary none  -MB       Row Name 01/16/24 1420          Cognition    Orientation Status (Cognition)  oriented to;person;disoriented to;place;situation;time  -MB       Row Name 01/16/24 1420          Safety Issues, Functional Mobility    Safety Issues Affecting Function (Mobility) awareness of need for assistance;insight into deficits/self-awareness;judgment;positioning of assistive device;problem-solving;safety precaution awareness;safety precautions follow-through/compliance;sequencing abilities  -MB     Impairments Affecting Function (Mobility) balance;cognition;endurance/activity tolerance;postural/trunk control;range of motion (ROM);strength;visual/perceptual  -MB               User Key  (r) = Recorded By, (t) = Taken By, (c) = Cosigned By      Initials Name Provider Type    MB Soo Lane, PT Physical Therapist                   Mobility       Row Name 01/16/24 1555          Bed Mobility    Supine-Sit [place] (Bed Mobility) minimum assist (75% patient effort);verbal cues  -MB     Assistive Device (Bed Mobility) bed rails;head of bed elevated  -MB     Comment, (Bed Mobility) Pt. required brief assist to support trunk and increased time to complete w/ cueing for sequencing.  -MB       Row Name 01/16/24 1550          Transfers    Comment, (Transfers) VCs/tactile cues for safe hand placement.  -MB       Row Name 01/16/24 1557          Bed-Chair Transfer    Bed-Chair [place] (Transfers) maximum assist (25% patient effort);2 person assist;verbal cues;nonverbal cues (demo/gesture)  -MB     Assistive Device (Bed-Chair Transfers) other (see comments)  BUE support  -MB       Row Name 01/16/24 1552          Sit-Stand Transfer    Sit-Stand [place] (Transfers) moderate assist (50% patient effort);2 person assist;verbal cues;nonverbal cues (demo/gesture)  -MB     Assistive Device (Sit-Stand Transfers) other (see comments)  BUE support  -MB       Row Name 01/16/24 1552          Gait/Stairs (Locomotion)    [place] Level (Gait) maximum assist (25% patient effort);2 person assist;verbal cues  -MB      Assistive Device (Gait) other (see comments)  BUE support  -MB     Distance in Feet (Gait) 2  -MB     Deviations/Abnormal Patterns (Gait) base of support, narrow;arianne decreased;festinating/shuffling;gait speed decreased;stride length decreased;weight shifting decreased  -MB     Bilateral Gait Deviations forward flexed posture;heel strike decreased  -MB     Comment, (Gait/Stairs) Pt. ambulated w/ step to, shuffling gait pattern w/ dec B step length/foot clearance, moderately unsteady. He required VCs/tactile cues for step sequence, increased B step length, and safe chair approach (limited by legal blindness).  -MB               User Key  (r) = Recorded By, (t) = Taken By, (c) = Cosigned By      Initials Name Provider Type    Soo Ybarra, PT Physical Therapist                   Obj/Interventions       Row Name 01/16/24 1559          Range of Motion Comprehensive    General Range of Motion bilateral lower extremity ROM WFL  -MB       Row Name 01/16/24 1559          Strength Comprehensive (MMT)    General Manual Muscle Testing (MMT) Assessment lower extremity strength deficits identified  -MB     Comment, General Manual Muscle Testing (MMT) Assessment BLEs grossly 4/5  -MB       Row Name 01/16/24 1559          Balance    Balance Assessment sitting static balance;standing static balance;standing dynamic balance  -MB     Static Sitting Balance minimal assist  -MB     Dynamic Sitting Balance minimal assist  -MB     Position, Sitting Balance unsupported;sitting edge of bed  -MB     Static Standing Balance moderate assist;2-person assist  -MB     Dynamic Standing Balance maximum assist;2-person assist  -MB     Position/Device Used, Standing Balance walker, rolling;supported  -MB     Balance Interventions sitting;standing;weight shifting activity;virtual reality based activity  -MB     Comment, Balance Pt. demo posterior lean in standing requiring consistent cueing/assist to shift weight anteriorly.  -MB                User Key  (r) = Recorded By, (t) = Taken By, (c) = Cosigned By      Initials Name Provider Type    Soo Ybarra, PT Physical Therapist                   Goals/Plan       Row Name 01/16/24 1603          Bed Mobility Goal 1 (PT)    Activity/Assistive Device (Bed Mobility Goal 1, PT) sit to supine/supine to sit  -MB     [place] Level/Cues Needed (Bed Mobility Goal 1, PT) minimum assist (75% or more patient effort)  -MB     Time Frame (Bed Mobility Goal 1, PT) 10 days  -MB       Row Name 01/16/24 1603          Transfer Goal 1 (PT)    Activity/Assistive Device (Transfer Goal 1, PT) sit-to-stand/stand-to-sit;bed-to-chair/chair-to-bed;walker, rolling  -MB     [place] Level/Cues Needed (Transfer Goal 1, PT) minimum assist (75% or more patient effort)  -MB       Row Name 01/16/24 1603          Gait Training Goal 1 (PT)    Activity/Assistive Device (Gait Training Goal 1, PT) gait (walking locomotion);walker, rolling  -MB     [place] Level (Gait Training Goal 1, PT) moderate assist (50-74% patient effort)  -MB     Distance (Gait Training Goal 1, PT) 10  -MB     Time Frame (Gait Training Goal 1, PT) 2 weeks  -MB       Row Name 01/16/24 1603          Patient Education Goal (PT)    Activity (Patient Education Goal, PT) HEP  -MB     [place]/Cues/Accuracy (Memory Goal 2, PT) demonstrates adequately  -MB     Time Frame (Patient Education Goal, PT) 10 days  -MB       Row Name 01/16/24 1603          Therapy Assessment/Plan (PT)    Planned Therapy Interventions (PT) balance training;bed mobility training;gait training;home exercise program;motor coordination training;patient/family education;postural re-education;stair training;transfer training  -MB               User Key  (r) = Recorded By, (t) = Taken By, (c) = Cosigned By      Initials Name Provider Type    Soo Ybarra, PT Physical Therapist                   Clinical Impression       Row Name 01/16/24 1600          Pain     Pretreatment Pain Rating 0/10 - no pain  -MB     Posttreatment Pain Rating 0/10 - no pain  -MB       Row Name 01/16/24 1600          Plan of Care Review    Plan of Care Reviewed With patient  -MB     Progress no change  -MB     Outcome Evaluation PT eval completed. Patient presents w/ decreased strength, impaired balance and coordination, signficant gait instability, decreased safety awareness, and is below his baseline. He required assist of 2 for transfers and taking steps to chair. Skilled IPPT warranted to improve pt's safety and independence w/ functional mobility. Recommend SNF at D/C.  -MB       Row Name 01/16/24 1600          Therapy Assessment/Plan (PT)    Patient/Family Therapy Goals Statement (PT) Pt. did not state.  -MB     Rehab Potential (PT) fair, will monitor progress closely  -MB     Criteria for Skilled Interventions Met (PT) yes;meets criteria;skilled treatment is necessary  -MB     Therapy Frequency (PT) daily  -MB       Row Name 01/16/24 1600          Vital Signs    Pre Systolic BP Rehab 160  -MB     Pre Treatment Diastolic BP 65  -MB     Intra Systolic BP Rehab 163  -MB     Intra Treatment Diastolic BP 76  -MB     Post Systolic BP Rehab 174  -MB     Post Treatment Diastolic BP 75  -MB     Pre SpO2 (%) 98  -MB     O2 Delivery Pre Treatment nasal cannula  -MB     O2 Delivery Intra Treatment nasal cannula  -MB     Post SpO2 (%) 96  -MB     O2 Delivery Post Treatment nasal cannula  -MB     Pre Patient Position Supine  -MB     Intra Patient Position Standing  -MB     Post Patient Position Sitting  -MB       Row Name 01/16/24 1600          Positioning and Restraints    Pre-Treatment Position in bed  -MB     Post Treatment Position chair  -MB     In Chair notified nsg;reclined;call light within reach;encouraged to call for assist;exit alarm on;waffle cushion;on mechanical lift sling;legs elevated;heels elevated  -MB               User Key  (r) = Recorded By, (t) = Taken By, (c) = Cosigned By       Initials Name Provider Type    Soo Ybarra, PT Physical Therapist                   Outcome Measures       Row Name 01/16/24 1604 01/16/24 0800       How much help from another person do you currently need...    Turning from your back to your side while in flat bed without using bedrails? 2  -MB 3  -BW    Moving from lying on back to sitting on the side of a flat bed without bedrails? 2  -MB 3  -BW    Moving to and from a bed to a chair (including a wheelchair)? 2  -MB 3  -BW    Standing up from a chair using your arms (e.g., wheelchair, bedside chair)? 2  -MB 3  -BW    Climbing 3-5 steps with a railing? 2  -MB 2  -BW    To walk in hospital room? 2  -MB 3  -BW    AM-PAC 6 Clicks Score (PT) 12  -MB 17  -BW    Highest Level of Mobility Goal 4 --> Transfer to chair/commode  -MB 5 --> Static standing  -BW      Row Name 01/16/24 1604 01/16/24 1432       Functional Assessment    Outcome Measure Options AM-PAC 6 Clicks Basic Mobility (PT)  -MB AM-PAC 6 Clicks Daily Activity (OT)  -CHRISTI              User Key  (r) = Recorded By, (t) = Taken By, (c) = Cosigned By      Initials Name Provider Type    Steffi Ennis, OT Occupational Therapist    Soo Ybarra, PT Physical Therapist    Antolin Fields, RN Registered Nurse                                 Physical Therapy Education       Title: PT OT SLP Therapies (In Progress)       Topic: Physical Therapy (In Progress)       Point: Mobility training (In Progress)       Learning Progress Summary             Patient Acceptance, E,D, NR by MB at 1/16/2024 1605                         Point: Home exercise program (In Progress)       Learning Progress Summary             Patient Acceptance, E,D, NR by MB at 1/16/2024 1605                         Point: Body mechanics (In Progress)       Learning Progress Summary             Patient Acceptance, E,D, NR by MB at 1/16/2024 1605                         Point: Precautions (In Progress)       Learning Progress  Summary             Patient Acceptance, E,D, NR by MB at 1/16/2024 1605                                         User Key       Initials Effective Dates Name Provider Type Discipline    MB 06/16/21 -  Soo Lane, PT Physical Therapist PT                  PT Recommendation and Plan  Planned Therapy Interventions (PT): balance training, bed mobility training, gait training, home exercise program, motor coordination training, patient/family education, postural re-education, stair training, transfer training  Plan of Care Reviewed With: patient  Progress: no change  Outcome Evaluation: PT eval completed. Patient presents w/ decreased strength, impaired balance and coordination, signficant gait instability, decreased safety awareness, and is below his baseline. He required assist of 2 for transfers and taking steps to chair. Skilled IPPT warranted to improve pt's safety and independence w/ functional mobility. Recommend SNF at D/C.     Time Calculation:   PT Evaluation Complexity  History, PT Evaluation Complexity: 1-2 personal factors and/or comorbidities  Examination of Body Systems (PT Eval Complexity): total of 3 or more elements  Clinical Presentation (PT Evaluation Complexity): evolving  Clinical Decision Making (PT Evaluation Complexity): moderate complexity  Overall Complexity (PT Evaluation Complexity): moderate complexity     PT Charges       Row Name 01/16/24 1606             Time Calculation    Start Time 1421  -MB      PT Received On 01/16/24  -MB      PT Goal Re-Cert Due Date 01/26/24  -MB         Untimed Charges    PT Eval/Re-eval Minutes 50  -MB         Total Minutes    Untimed Charges Total Minutes 50  -MB       Total Minutes 50  -MB                User Key  (r) = Recorded By, (t) = Taken By, (c) = Cosigned By      Initials Name Provider Type    Soo Ybarra, PT Physical Therapist                  Therapy Charges for Today       Code Description Service Date Service Provider Modifiers  Qty    [id number]  PT EVAL MOD COMPLEXITY 4 1/16/2024 Soo Lane, PT GP 1            PT G-Codes  Outcome Measure Options: AM-PAC 6 Clicks Basic Mobility (PT)  AM-PAC 6 Clicks Score (PT): 12  AM-PAC 6 Clicks Score (OT): 9  PT Discharge Summary  Anticipated Discharge Disposition (PT): skilled nursing facility    Soo Lane, PT  1/16/2024

## 2024-01-17 LAB
ANION GAP SERPL CALCULATED.3IONS-SCNC: 10 MMOL/L (ref 5–15)
BASOPHILS # BLD AUTO: 0.02 10*3/MM3 (ref 0–0.2)
BASOPHILS NFR BLD AUTO: 0.3 % (ref 0–1.5)
BH BB BLOOD EXPIRATION DATE: NORMAL
BH BB BLOOD TYPE BARCODE: 5100
BH BB DISPENSE STATUS: NORMAL
BH BB PRODUCT CODE: NORMAL
BH BB UNIT NUMBER: NORMAL
BH CV ECHO MEAS - AO MAX PG: 7.4 MMHG
BH CV ECHO MEAS - AO MEAN PG: 4.3 MMHG
BH CV ECHO MEAS - AO ROOT DIAM: 3.2 CM
BH CV ECHO MEAS - AO V2 MAX: 135 CM/SEC
BH CV ECHO MEAS - AO V2 VTI: 30.6 CM
BH CV ECHO MEAS - AVA(I,D): 2.4 CM2
BH CV ECHO MEAS - EDV(CUBED): 91.1 ML
BH CV ECHO MEAS - EDV(MOD-SP2): 63.6 ML
BH CV ECHO MEAS - EDV(MOD-SP4): 118 ML
BH CV ECHO MEAS - EF(MOD-BP): 56.6 %
BH CV ECHO MEAS - EF(MOD-SP2): 51.3 %
BH CV ECHO MEAS - EF(MOD-SP4): 56.7 %
BH CV ECHO MEAS - ESV(CUBED): 16.6 ML
BH CV ECHO MEAS - ESV(MOD-SP2): 31 ML
BH CV ECHO MEAS - ESV(MOD-SP4): 51.1 ML
BH CV ECHO MEAS - FS: 43.4 %
BH CV ECHO MEAS - IVS/LVPW: 0.65 CM
BH CV ECHO MEAS - IVSD: 1.1 CM
BH CV ECHO MEAS - LA DIMENSION: 4 CM
BH CV ECHO MEAS - LAT PEAK E' VEL: 8.8 CM/SEC
BH CV ECHO MEAS - LV DIASTOLIC VOL/BSA (35-75): 63.6 CM2
BH CV ECHO MEAS - LV MASS(C)D: 248.4 GRAMS
BH CV ECHO MEAS - LV MAX PG: 4.8 MMHG
BH CV ECHO MEAS - LV MEAN PG: 2.5 MMHG
BH CV ECHO MEAS - LV SYSTOLIC VOL/BSA (12-30): 27.5 CM2
BH CV ECHO MEAS - LV V1 MAX: 109.5 CM/SEC
BH CV ECHO MEAS - LV V1 VTI: 22.9 CM
BH CV ECHO MEAS - LVIDD: 4.5 CM
BH CV ECHO MEAS - LVIDS: 2.5 CM
BH CV ECHO MEAS - LVOT AREA: 3.2 CM2
BH CV ECHO MEAS - LVOT DIAM: 2.02 CM
BH CV ECHO MEAS - LVPWD: 1.7 CM
BH CV ECHO MEAS - MED PEAK E' VEL: 5.5 CM/SEC
BH CV ECHO MEAS - MV A MAX VEL: 98.1 CM/SEC
BH CV ECHO MEAS - MV DEC TIME: 0.24 SEC
BH CV ECHO MEAS - MV E MAX VEL: 115.7 CM/SEC
BH CV ECHO MEAS - MV E/A: 1.18
BH CV ECHO MEAS - MV MAX PG: 5.8 MMHG
BH CV ECHO MEAS - MV MEAN PG: 2.8 MMHG
BH CV ECHO MEAS - MV V2 VTI: 42.4 CM
BH CV ECHO MEAS - MVA(VTI): 1.74 CM2
BH CV ECHO MEAS - PA ACC TIME: 0.14 SEC
BH CV ECHO MEAS - PA V2 MAX: 100.7 CM/SEC
BH CV ECHO MEAS - PI END-D VEL: 107 CM/SEC
BH CV ECHO MEAS - RAP SYSTOLE: 3 MMHG
BH CV ECHO MEAS - RVSP: 38 MMHG
BH CV ECHO MEAS - SI(MOD-SP2): 17.6 ML/M2
BH CV ECHO MEAS - SI(MOD-SP4): 36 ML/M2
BH CV ECHO MEAS - SV(LVOT): 73.6 ML
BH CV ECHO MEAS - SV(MOD-SP2): 32.6 ML
BH CV ECHO MEAS - SV(MOD-SP4): 66.9 ML
BH CV ECHO MEAS - TAPSE (>1.6): 3 CM
BH CV ECHO MEAS - TR MAX PG: 35 MMHG
BH CV ECHO MEAS - TR MAX VEL: 295.7 CM/SEC
BH CV ECHO MEASUREMENTS AVERAGE E/E' RATIO: 16.18
BH CV VAS BP RIGHT ARM: NORMAL MMHG
BH CV XLRA - RV BASE: 4.3 CM
BH CV XLRA - RV LENGTH: 7.9 CM
BH CV XLRA - RV MID: 3.4 CM
BH CV XLRA - TDI S': 14.7 CM/SEC
BUN SERPL-MCNC: 30 MG/DL (ref 8–23)
BUN/CREAT SERPL: 20.1 (ref 7–25)
CALCIUM SPEC-SCNC: 8.5 MG/DL (ref 8.6–10.5)
CHLORIDE SERPL-SCNC: 102 MMOL/L (ref 98–107)
CO2 SERPL-SCNC: 26 MMOL/L (ref 22–29)
CREAT SERPL-MCNC: 1.49 MG/DL (ref 0.76–1.27)
CROSSMATCH INTERPRETATION: NORMAL
DEPRECATED RDW RBC AUTO: 50.4 FL (ref 37–54)
EGFRCR SERPLBLD CKD-EPI 2021: 51.4 ML/MIN/1.73
EOSINOPHIL # BLD AUTO: 0.22 10*3/MM3 (ref 0–0.4)
EOSINOPHIL NFR BLD AUTO: 3.3 % (ref 0.3–6.2)
ERYTHROCYTE [DISTWIDTH] IN BLOOD BY AUTOMATED COUNT: 15.3 % (ref 12.3–15.4)
GLUCOSE BLDC GLUCOMTR-MCNC: 152 MG/DL (ref 70–130)
GLUCOSE BLDC GLUCOMTR-MCNC: 153 MG/DL (ref 70–130)
GLUCOSE BLDC GLUCOMTR-MCNC: 171 MG/DL (ref 70–130)
GLUCOSE BLDC GLUCOMTR-MCNC: 211 MG/DL (ref 70–130)
GLUCOSE BLDC GLUCOMTR-MCNC: 241 MG/DL (ref 70–130)
GLUCOSE BLDC GLUCOMTR-MCNC: 303 MG/DL (ref 70–130)
GLUCOSE SERPL-MCNC: 145 MG/DL (ref 65–99)
HCT VFR BLD AUTO: 23.3 % (ref 37.5–51)
HGB BLD-MCNC: 7.4 G/DL (ref 13–17.7)
IMM GRANULOCYTES # BLD AUTO: 0.01 10*3/MM3 (ref 0–0.05)
IMM GRANULOCYTES NFR BLD AUTO: 0.2 % (ref 0–0.5)
LEFT ATRIUM VOLUME INDEX: 37.9 ML/M2
LYMPHOCYTES # BLD AUTO: 1.63 10*3/MM3 (ref 0.7–3.1)
LYMPHOCYTES NFR BLD AUTO: 24.7 % (ref 19.6–45.3)
MAGNESIUM SERPL-MCNC: 1.9 MG/DL (ref 1.6–2.4)
MCH RBC QN AUTO: 28.9 PG (ref 26.6–33)
MCHC RBC AUTO-ENTMCNC: 31.8 G/DL (ref 31.5–35.7)
MCV RBC AUTO: 91 FL (ref 79–97)
MONOCYTES # BLD AUTO: 0.8 10*3/MM3 (ref 0.1–0.9)
MONOCYTES NFR BLD AUTO: 12.1 % (ref 5–12)
NEUTROPHILS NFR BLD AUTO: 3.92 10*3/MM3 (ref 1.7–7)
NEUTROPHILS NFR BLD AUTO: 59.4 % (ref 42.7–76)
NRBC BLD AUTO-RTO: 0 /100 WBC (ref 0–0.2)
PLATELET # BLD AUTO: 162 10*3/MM3 (ref 140–450)
PMV BLD AUTO: 10.9 FL (ref 6–12)
POTASSIUM SERPL-SCNC: 4 MMOL/L (ref 3.5–5.2)
RBC # BLD AUTO: 2.56 10*6/MM3 (ref 4.14–5.8)
SODIUM SERPL-SCNC: 138 MMOL/L (ref 136–145)
UNIT  ABO: NORMAL
UNIT  RH: NORMAL
WBC NRBC COR # BLD AUTO: 6.6 10*3/MM3 (ref 3.4–10.8)

## 2024-01-17 PROCEDURE — 25010000002 VANCOMYCIN 10 G RECONSTITUTED SOLUTION: Performed by: STUDENT IN AN ORGANIZED HEALTH CARE EDUCATION/TRAINING PROGRAM

## 2024-01-17 PROCEDURE — 63710000001 INSULIN REGULAR HUMAN PER 5 UNITS: Performed by: STUDENT IN AN ORGANIZED HEALTH CARE EDUCATION/TRAINING PROGRAM

## 2024-01-17 PROCEDURE — 80048 BASIC METABOLIC PNL TOTAL CA: CPT | Performed by: STUDENT IN AN ORGANIZED HEALTH CARE EDUCATION/TRAINING PROGRAM

## 2024-01-17 PROCEDURE — 99232 SBSQ HOSP IP/OBS MODERATE 35: CPT | Performed by: STUDENT IN AN ORGANIZED HEALTH CARE EDUCATION/TRAINING PROGRAM

## 2024-01-17 PROCEDURE — 85025 COMPLETE CBC W/AUTO DIFF WBC: CPT | Performed by: STUDENT IN AN ORGANIZED HEALTH CARE EDUCATION/TRAINING PROGRAM

## 2024-01-17 PROCEDURE — 25010000002 CEFEPIME PER 500 MG: Performed by: STUDENT IN AN ORGANIZED HEALTH CARE EDUCATION/TRAINING PROGRAM

## 2024-01-17 PROCEDURE — 25810000003 SODIUM CHLORIDE 0.9 % SOLUTION: Performed by: STUDENT IN AN ORGANIZED HEALTH CARE EDUCATION/TRAINING PROGRAM

## 2024-01-17 PROCEDURE — 25010000002 FUROSEMIDE PER 20 MG: Performed by: STUDENT IN AN ORGANIZED HEALTH CARE EDUCATION/TRAINING PROGRAM

## 2024-01-17 PROCEDURE — 25010000002 HEPARIN (PORCINE) PER 1000 UNITS: Performed by: STUDENT IN AN ORGANIZED HEALTH CARE EDUCATION/TRAINING PROGRAM

## 2024-01-17 PROCEDURE — 83735 ASSAY OF MAGNESIUM: CPT | Performed by: STUDENT IN AN ORGANIZED HEALTH CARE EDUCATION/TRAINING PROGRAM

## 2024-01-17 PROCEDURE — 82948 REAGENT STRIP/BLOOD GLUCOSE: CPT

## 2024-01-17 RX ORDER — FUROSEMIDE 10 MG/ML
40 INJECTION INTRAMUSCULAR; INTRAVENOUS 2 TIMES DAILY
Status: DISCONTINUED | OUTPATIENT
Start: 2024-01-17 | End: 2024-01-20

## 2024-01-17 RX ORDER — FAMOTIDINE 20 MG/1
20 TABLET, FILM COATED ORAL DAILY
Status: DISCONTINUED | OUTPATIENT
Start: 2024-01-18 | End: 2024-01-21 | Stop reason: HOSPADM

## 2024-01-17 RX ADMIN — FERROUS SULFATE TAB 325 MG (65 MG ELEMENTAL FE) 325 MG: 325 (65 FE) TAB at 08:42

## 2024-01-17 RX ADMIN — VANCOMYCIN HYDROCHLORIDE 1250 MG: 10 INJECTION, POWDER, LYOPHILIZED, FOR SOLUTION INTRAVENOUS at 08:41

## 2024-01-17 RX ADMIN — THIAMINE HCL TAB 100 MG 100 MG: 100 TAB at 21:24

## 2024-01-17 RX ADMIN — Medication 10 ML: at 21:25

## 2024-01-17 RX ADMIN — SENNOSIDES AND DOCUSATE SODIUM 2 TABLET: 8.6; 5 TABLET ORAL at 08:41

## 2024-01-17 RX ADMIN — HEPARIN SODIUM 5000 UNITS: 5000 INJECTION INTRAVENOUS; SUBCUTANEOUS at 05:56

## 2024-01-17 RX ADMIN — CARVEDILOL 25 MG: 12.5 TABLET, FILM COATED ORAL at 08:41

## 2024-01-17 RX ADMIN — INSULIN HUMAN 2 UNITS: 100 INJECTION, SOLUTION PARENTERAL at 05:55

## 2024-01-17 RX ADMIN — HEPARIN SODIUM 5000 UNITS: 5000 INJECTION INTRAVENOUS; SUBCUTANEOUS at 23:48

## 2024-01-17 RX ADMIN — AMLODIPINE BESYLATE 5 MG: 5 TABLET ORAL at 08:42

## 2024-01-17 RX ADMIN — DIVALPROEX SODIUM 375 MG: 125 TABLET, DELAYED RELEASE ORAL at 08:42

## 2024-01-17 RX ADMIN — FLUOXETINE 20 MG: 20 CAPSULE ORAL at 08:42

## 2024-01-17 RX ADMIN — THIAMINE HCL TAB 100 MG 100 MG: 100 TAB at 08:42

## 2024-01-17 RX ADMIN — BRIMONIDINE TARTRATE 1 DROP: 2 SOLUTION OPHTHALMIC at 23:49

## 2024-01-17 RX ADMIN — TRAZODONE HYDROCHLORIDE 25 MG: 50 TABLET ORAL at 21:23

## 2024-01-17 RX ADMIN — THIAMINE HCL TAB 100 MG 100 MG: 100 TAB at 15:53

## 2024-01-17 RX ADMIN — BRIMONIDINE TARTRATE 1 DROP: 2 SOLUTION OPHTHALMIC at 08:43

## 2024-01-17 RX ADMIN — INSULIN HUMAN 3 UNITS: 100 INJECTION, SOLUTION PARENTERAL at 19:00

## 2024-01-17 RX ADMIN — FUROSEMIDE 40 MG: 10 INJECTION, SOLUTION INTRAMUSCULAR; INTRAVENOUS at 19:00

## 2024-01-17 RX ADMIN — FUROSEMIDE 80 MG: 10 INJECTION, SOLUTION INTRAMUSCULAR; INTRAVENOUS at 05:56

## 2024-01-17 RX ADMIN — DIVALPROEX SODIUM 375 MG: 125 TABLET, DELAYED RELEASE ORAL at 21:25

## 2024-01-17 RX ADMIN — INSULIN HUMAN 2 UNITS: 100 INJECTION, SOLUTION PARENTERAL at 12:53

## 2024-01-17 RX ADMIN — FLUOXETINE 20 MG: 20 CAPSULE ORAL at 21:23

## 2024-01-17 RX ADMIN — CEFEPIME 2000 MG: 2 INJECTION, POWDER, FOR SOLUTION INTRAVENOUS at 12:46

## 2024-01-17 RX ADMIN — CEFEPIME 2000 MG: 2 INJECTION, POWDER, FOR SOLUTION INTRAVENOUS at 01:49

## 2024-01-17 RX ADMIN — Medication 6000 UNITS: at 08:42

## 2024-01-17 RX ADMIN — FAMOTIDINE 20 MG: 20 TABLET, FILM COATED ORAL at 08:42

## 2024-01-17 RX ADMIN — CARVEDILOL 25 MG: 12.5 TABLET, FILM COATED ORAL at 19:00

## 2024-01-17 RX ADMIN — TIMOLOL MALEATE 1 DROP: 5 SOLUTION/ DROPS OPHTHALMIC at 23:49

## 2024-01-17 RX ADMIN — ATORVASTATIN CALCIUM 20 MG: 20 TABLET, FILM COATED ORAL at 21:25

## 2024-01-17 RX ADMIN — INSULIN HUMAN 2 UNITS: 100 INJECTION, SOLUTION PARENTERAL at 00:22

## 2024-01-17 RX ADMIN — TIMOLOL MALEATE 1 DROP: 5 SOLUTION/ DROPS OPHTHALMIC at 08:42

## 2024-01-17 RX ADMIN — CEFEPIME 2000 MG: 2 INJECTION, POWDER, FOR SOLUTION INTRAVENOUS at 23:49

## 2024-01-17 RX ADMIN — Medication 5 MG: at 21:24

## 2024-01-17 RX ADMIN — Medication 10 ML: at 08:45

## 2024-01-17 RX ADMIN — HEPARIN SODIUM 5000 UNITS: 5000 INJECTION INTRAVENOUS; SUBCUTANEOUS at 15:36

## 2024-01-17 NOTE — PLAN OF CARE
Goal Outcome Evaluation:  Plan of Care Reviewed With: patient           Outcome Evaluation: Pt Alert to self/VSS/ 97% on RA. Pt continues to have elevated BP. Denies N/V/ dizziness. Continue monitoring.

## 2024-01-17 NOTE — PROGRESS NOTES
[facility] Medicine Services  PROGRESS NOTE    Patient Name: Omkar Nava  : 1955  MRN: [id number]    Date of Admission: 1/15/2024  Primary Care Physician: Deann Cao MD    Subjective   Subjective     CC:  F/u dyspnea    HPI:  No new issues overnight, he specifically denies CP/dyspnea      Objective   Objective     Vital Signs:   Temp:  [97.9 °F (36.6 °C)] 97.9 °F (36.6 °C)  Heart Rate:  [56-69] 58  Resp:  [18] 18  BP: (138-179)/(66-87) 154/66  Flow (L/min):  [2-3] 2     Physical Exam:  Constitutional: elderly, resting comfortably, chronically ill appearing  HENT: NCAT, mucous membranes moist  Respiratory:bibasilar crackles, 2L NC, anterior auscultation sounds clear. Resp effort is nl  Cardiovascular: RRR, no murmurs, rubs, or gallops  Gastrointestinal: Positive bowel sounds, soft, nontender, nondistended  Musculoskeletal: trace bilateral ankle edema  Psychiatric: Appropriate affect, cooperative  Neurologic: Oriented to person only (daughter states that this is baseline), moves extremities spontaneously, no focal deficits  Skin: No rashes      Results Reviewed:  LAB RESULTS:      Lab 24  0404 24  1744 24  0709 01/15/24  0616 01/15/24  0325 01/15/24  0118   WBC 6.60  --  5.90 8.26  --  8.26   HEMOGLOBIN 7.4* 8.0* 6.4* 7.1*  --  7.6*   HEMATOCRIT 23.3* 24.7* 20.7* 22.3*  --  25.0*   PLATELETS 162  --  152 153  --  155   NEUTROS ABS 3.92  --  3.39  --   --  6.42   IMMATURE GRANS (ABS) 0.01  --  0.02  --   --  0.03   LYMPHS ABS 1.63  --  1.50  --   --  0.79   MONOS ABS 0.80  --  0.82  --   --  0.85   EOS ABS 0.22  --  0.15  --   --  0.15   MCV 91.0  --  94.5 94.9  --  98.0*   PROCALCITONIN  --   --   --   --  0.26*  --    LACTATE  --   --   --   --   --  1.6   D DIMER QUANT  --   --   --   --   --  0.83*         Lab 24  0404 24  0709 01/15/24  1143 01/15/24  0614 01/15/24  0118   SODIUM 138 143 144 140 141   POTASSIUM 4.0 4.1 4.8 5.1 5.2    CHLORIDE 102 107 109* 107 108*   CO2 26.0 26.0 25.0 25.0 24.0   ANION GAP 10.0 10.0 10.0 8.0 9.0   BUN 30* 26* 33* 32* 33*   CREATININE 1.49* 1.52* 1.60* 1.56* 1.64*   EGFR 51.4* 50.2* 47.2* 48.7* 45.8*   GLUCOSE 145* 94 186* 245* 304*   CALCIUM 8.5* 8.4* 8.7 8.4* 8.7   MAGNESIUM 1.9 2.0  --  2.4  --          Lab 01/15/24  0118   TOTAL PROTEIN 7.0   ALBUMIN 3.5   GLOBULIN 3.5   ALT (SGPT) 47*   AST (SGOT) 32   BILIRUBIN 0.2   ALK PHOS 110         Lab 01/15/24  0614 01/15/24  0325 01/15/24  0118   PROBNP  --   --  905.7*   HSTROP T 73* 97* 83*             Lab 01/16/24  0833 01/15/24  0614   IRON  --  17*   IRON SATURATION (TSAT)  --  6*   TIBC  --  289*   TRANSFERRIN  --  194*   FERRITIN  --  312.80   ABO TYPING O  --    RH TYPING Positive  --    ANTIBODY SCREEN Negative  --          Lab 01/15/24  0204   PH, ARTERIAL 7.366   PCO2, ARTERIAL 43.2   PO2 ART 87.8   FIO2 45   HCO3 ART 24.7   BASE EXCESS ART -0.7*   CARBOXYHEMOGLOBIN 1.9     Brief Urine Lab Results  (Last result in the past 365 days)        Color   Clarity   Blood   Leuk Est   Nitrite   Protein   CREAT   Urine HCG        08/02/23 1319 Yellow   Clear   Negative   Negative   Negative   30 mg/dL (1+)                   Microbiology Results Abnormal       Procedure Component Value - Date/Time    Blood Culture - Blood, Arm, Left [[id number]]  (Normal) Collected: 01/15/24 0118    Lab Status: Preliminary result Specimen: Blood from Arm, Left Updated: 01/17/24 0245     Blood Culture No growth at 2 days    Blood Culture - Blood, Arm, Right [[id number]]  (Normal) Collected: 01/15/24 0118    Lab Status: Preliminary result Specimen: Blood from Arm, Right Updated: 01/17/24 0245     Blood Culture No growth at 2 days    S. Pneumo Ag Urine or CSF - Urine, Urine, Clean Catch [[id number]]  (Normal) Collected: 01/15/24 0250    Lab Status: Final result Specimen: Urine, Clean Catch Updated: 01/15/24 1102     Strep Pneumo Ag Negative    Legionella Antigen, Urine - Urine, Urine,  Clean Catch [[id number]]  (Normal) Collected: 01/15/24 0250    Lab Status: Final result Specimen: Urine, Clean Catch Updated: 01/15/24 1102     LEGIONELLA ANTIGEN, URINE Negative    Respiratory Panel PCR w/COVID-19(SARS-CoV-2) GIANNI/SIENNA/ANNA/PAD/COR/ASHIA In-House, NP Swab in UTM/VTM, 2 HR TAT - Swab, Nasopharynx [[id number]]  (Normal) Collected: 01/15/24 0115    Lab Status: Final result Specimen: Swab from Nasopharynx Updated: 01/15/24 0225     ADENOVIRUS, PCR Not Detected     Coronavirus 229E Not Detected     Coronavirus HKU1 Not Detected     Coronavirus NL63 Not Detected     Coronavirus OC43 Not Detected     COVID19 Not Detected     Human Metapneumovirus Not Detected     Human Rhinovirus/Enterovirus Not Detected     Influenza A PCR Not Detected     Influenza B PCR Not Detected     Parainfluenza Virus 1 Not Detected     Parainfluenza Virus 2 Not Detected     Parainfluenza Virus 3 Not Detected     Parainfluenza Virus 4 Not Detected     RSV, PCR Not Detected     Bordetella pertussis pcr Not Detected     Bordetella parapertussis PCR Not Detected     Chlamydophila pneumoniae PCR Not Detected     Mycoplasma pneumo by PCR Not Detected    Narrative:      In the setting of a positive respiratory panel with a viral infection PLUS a negative procalcitonin without other underlying concern for bacterial infection, consider observing off antibiotics or discontinuation of antibiotics and continue supportive care. If the respiratory panel is positive for atypical bacterial infection (Bordetella pertussis, Chlamydophila pneumoniae, or Mycoplasma pneumoniae), consider antibiotic de-escalation to target atypical bacterial infection.            Adult Transthoracic Echo Complete With Contrast if Necessary Per Protocol    Result Date: 1/17/2024    Left ventricular ejection fraction appears to be 56 - 60%.   Left ventricular wall thickness is consistent with hypertrophy.   Estimated right ventricular systolic pressure from tricuspid  regurgitation is mildly elevated (35-45 mmHg).   There is a small (1-2cm) pericardial effusion.   No evidence for pericardial tamponade     XR Abdomen KUB    Result Date: 1/16/2024  XR ABDOMEN KUB Date of Exam: 1/16/2024 10:33 AM EST Indication: guarding on exam, concern for ileus/obstruction Comparison: CT abdomen pelvis 7/31/2023. Findings: Nonobstructive bowel gas pattern. Large volume of formed stool throughout the colon.     Impression: Impression: No evidence of bowel obstruction. Large stool burden, suggestive of constipation. Electronically Signed: Anjum Prado MD  1/16/2024 11:15 AM EST  Workstation ID: [id number]     Results for orders placed during the hospital encounter of 01/15/24    Adult Transthoracic Echo Complete With Contrast if Necessary Per Protocol    Interpretation Summary    Left ventricular ejection fraction appears to be 56 - 60%.    Left ventricular wall thickness is consistent with hypertrophy.    Estimated right ventricular systolic pressure from tricuspid regurgitation is mildly elevated (35-45 mmHg).    There is a small (1-2cm) pericardial effusion.    No evidence for pericardial tamponade      Current medications:  Scheduled Meds:amLODIPine, 5 mg, Oral, Q24H  atorvastatin, 20 mg, Oral, Nightly  brimonidine, 1 drop, Both Eyes, BID   And  timolol, 1 drop, Both Eyes, BID  calcium gluconate, 2,000 mg, Intravenous, Once  carvedilol, 25 mg, Oral, BID With Meals  cefepime, 2,000 mg, Intravenous, Q12H  cholecalciferol, 6,000 Units, Oral, Daily  divalproex, 375 mg, Oral, BID  [START ON 1/18/2024] famotidine, 20 mg, Oral, Daily  ferrous sulfate, 325 mg, Oral, Daily With Breakfast  FLUoxetine, 20 mg, Oral, BID  furosemide, 80 mg, Intravenous, BID  heparin (porcine), 5,000 Units, Subcutaneous, Q8H  [Held by provider] hydrALAZINE, 75 mg, Oral, Q8H  insulin regular, 2-7 Units, Subcutaneous, Q6H  melatonin, 5 mg, Oral, Nightly  senna-docusate sodium, 2 tablet, Oral, BID  sodium chloride, 10 mL,  Intravenous, Q12H  thiamine, 100 mg, Oral, TID  traZODone, 25 mg, Oral, Nightly  vancomycin, 1,250 mg, Intravenous, Q24H      Continuous Infusions:Pharmacy to dose vancomycin,       PRN Meds:.  acetaminophen    albuterol    senna-docusate sodium **AND** polyethylene glycol **AND** bisacodyl **AND** bisacodyl    Calcium Replacement - Follow Nurse / BPA Driven Protocol    dextrose    dextrose    glucagon (human recombinant)    hydrOXYzine    Magnesium Standard Dose Replacement - Follow Nurse / BPA Driven Protocol    Pharmacy to dose vancomycin    Phosphorus Replacement - Follow Nurse / BPA Driven Protocol    Potassium Replacement - Follow Nurse / BPA Driven Protocol    sodium chloride    sodium chloride    sodium chloride    sodium chloride    Assessment & Plan   Assessment & Plan     Active Hospital Problems    Diagnosis  POA    **Hypoxia [R09.02]  Yes    Aspiration pneumonia [J69.0]  Yes    Dementia [F03.90]  Yes    S/P transmetatarsal amputation of foot, left [Z89.432]  Not Applicable    Anemia of chronic disease [D63.8]  Yes    Personal history of Methicillin resistant Staphylococcus aureus infection [Z86.14]  Yes    Neurocognitive disorder [R41.9]  Yes    Type 2 diabetes mellitus [E11.9]  Yes    Essential hypertension [I10]  Yes    Psychotic disorder [F29]  Yes    Type 2 diabetes mellitus with diabetic neuropathy, unspecified [E11.40]  Yes    Type 2 diabetes mellitus with diabetic chronic kidney disease [E11.22]  Yes      Resolved Hospital Problems   No resolved problems to display.        Brief Hospital Course to date:  Omkar Nava is a 68 y.o. male with history of chronic systolic heart failure, type 2 diabetes on insulin, anemia, hypertension, CKD, history of osteomyelitis status post left TMA, dementia presenting with acute hypoxemic respiratory failure.  He was admitted to hospitalist service with concern for multifocal pneumonia    Acute on chronic hypoxemic respiratory failure secondary to,  Multifocal  pneumonia with risk factors for healthcare associated infection  Acute on chronic exacerbation of systolic heart failure  -initially required HFNC, now on NC and seems relatively stable  -- Chest x-ray shows multifocal opacities greater on right than left concerning for multifocal pneumonia versus asymmetric pulmonary edema.    --placed on vanc, cefepime doxy for concern of healthcare acquired PNA w recent facility stay. Resp panel neg, legionella and strep neg. MRSA detected. Blood cultures in process  --Holding home torsemide and switched to 80 mg Lasix IV twice daily, on heart failure pathway.  Will decrease to 40 twice daily, seems to actually be improving with IV diuresis  -Echo shows EF of 56 to 60% and left ventricle hypertrophy.  -Continue home carvedilol.  He no longer appears to be taking SGLT2 or ACE inhibitor.  Will see where creatinine settles out, if improving can optimize guideline directed medical therapy     Acute kidney injury on CKD 3  -Baseline creatinine appears to be 1.3, he is 1.64 on admission and continues to trend down.  This is improving with diuresis possibly has a component of cardiorenal syndrome     Elevated serum troponin level  -Troponin 83 on admission without ischemic changes on EKG or complaints of chest pain.  Trending down.  Suspect demand ischemia secondary to lung pathology and worsened by CKD     Anemia of chronic disease  -Baseline hemoglobin around 8, he is 7.6 on admission.  No signs of active bleeding.  iron is low, panel overall consistent w AOCD. Given new O2 requirement he was transfused on 1/sixteen 1 unit of packed red blood cells. Would benefit from IV iron before dc after acute infection issues     Hypertension  -added back amlodipine and coreg from home regimen, hold torsemide as he is receiving diuresis IV     Type 2 diabetes  -Uses 6 units of insulin glargine at skilled nursing facility.  continue SSI for now     History of dementia with behavioral  disturbance  Insomnia  -Continue home medications  --complicates all aspects of care, high risk of hospital delirium        Expected Discharge Location and Transportation: back to [facility]  Expected Discharge   Expected Discharge Date: 1/19/2024; Expected Discharge Time:      DVT prophylaxis:  Medical DVT prophylaxis orders are present.     AM-PAC 6 Clicks Score (PT): 13 (01/16/24 0843)    CODE STATUS:   Code Status and Medical Interventions:   Ordered at: 01/15/24 0333     Code Status (Patient has no pulse and is not breathing):    CPR (Attempt to Resuscitate)     Medical Interventions (Patient has pulse or is breathing):    Full Support       Renetta Beckford MD  01/17/24

## 2024-01-17 NOTE — CASE MANAGEMENT/SOCIAL WORK
Continued Stay Note   [facility]     Patient Name: Omkar Nava  MRN: [id number]  Today's Date: 1/17/2024    Admit Date: 1/15/2024    Plan: LTC   Discharge Plan       Row Name 01/17/24 1621       Plan    Plan LTC    Patient/Family in Agreement with Plan yes    Plan Comments Discussed patient in MDR. Patient is not medically ready for discharge. Per Keyla with [facility] ([phone]) patient is in a LTC bed, but does not have a bed hold. His long-term medicaid is pending. Per Keyla, if they have a bed available when medically ready, they will take him back. CM to notify Keyla when approaching medical readiness. CM will continue to follow.    Final Discharge Disposition Code 04 - intermediate care facility                   Discharge Codes    No documentation.                 Expected Discharge Date and Time       Expected Discharge Date Expected Discharge Time    Jan 19, 2024               Ying Fallon RN

## 2024-01-18 LAB
ANION GAP SERPL CALCULATED.3IONS-SCNC: 8 MMOL/L (ref 5–15)
BASOPHILS # BLD AUTO: 0.03 10*3/MM3 (ref 0–0.2)
BASOPHILS NFR BLD AUTO: 0.5 % (ref 0–1.5)
BUN SERPL-MCNC: 31 MG/DL (ref 8–23)
BUN/CREAT SERPL: 18.9 (ref 7–25)
CALCIUM SPEC-SCNC: 8.6 MG/DL (ref 8.6–10.5)
CHLORIDE SERPL-SCNC: 106 MMOL/L (ref 98–107)
CO2 SERPL-SCNC: 30 MMOL/L (ref 22–29)
CREAT SERPL-MCNC: 1.64 MG/DL (ref 0.76–1.27)
DEPRECATED RDW RBC AUTO: 49.7 FL (ref 37–54)
EGFRCR SERPLBLD CKD-EPI 2021: 45.8 ML/MIN/1.73
EOSINOPHIL # BLD AUTO: 0.2 10*3/MM3 (ref 0–0.4)
EOSINOPHIL NFR BLD AUTO: 3.1 % (ref 0.3–6.2)
ERYTHROCYTE [DISTWIDTH] IN BLOOD BY AUTOMATED COUNT: 14.8 % (ref 12.3–15.4)
GLUCOSE BLDC GLUCOMTR-MCNC: 108 MG/DL (ref 70–130)
GLUCOSE BLDC GLUCOMTR-MCNC: 226 MG/DL (ref 70–130)
GLUCOSE BLDC GLUCOMTR-MCNC: 232 MG/DL (ref 70–130)
GLUCOSE SERPL-MCNC: 109 MG/DL (ref 65–99)
HCT VFR BLD AUTO: 24 % (ref 37.5–51)
HGB BLD-MCNC: 7.8 G/DL (ref 13–17.7)
IMM GRANULOCYTES # BLD AUTO: 0.02 10*3/MM3 (ref 0–0.05)
IMM GRANULOCYTES NFR BLD AUTO: 0.3 % (ref 0–0.5)
LYMPHOCYTES # BLD AUTO: 1.46 10*3/MM3 (ref 0.7–3.1)
LYMPHOCYTES NFR BLD AUTO: 22.7 % (ref 19.6–45.3)
MAGNESIUM SERPL-MCNC: 2 MG/DL (ref 1.6–2.4)
MCH RBC QN AUTO: 29.7 PG (ref 26.6–33)
MCHC RBC AUTO-ENTMCNC: 32.5 G/DL (ref 31.5–35.7)
MCV RBC AUTO: 91.3 FL (ref 79–97)
MONOCYTES # BLD AUTO: 0.87 10*3/MM3 (ref 0.1–0.9)
MONOCYTES NFR BLD AUTO: 13.6 % (ref 5–12)
NEUTROPHILS NFR BLD AUTO: 3.84 10*3/MM3 (ref 1.7–7)
NEUTROPHILS NFR BLD AUTO: 59.8 % (ref 42.7–76)
NRBC BLD AUTO-RTO: 0 /100 WBC (ref 0–0.2)
PLATELET # BLD AUTO: 198 10*3/MM3 (ref 140–450)
PMV BLD AUTO: 11.3 FL (ref 6–12)
POTASSIUM SERPL-SCNC: 4.1 MMOL/L (ref 3.5–5.2)
RBC # BLD AUTO: 2.63 10*6/MM3 (ref 4.14–5.8)
SODIUM SERPL-SCNC: 144 MMOL/L (ref 136–145)
WBC NRBC COR # BLD AUTO: 6.42 10*3/MM3 (ref 3.4–10.8)

## 2024-01-18 PROCEDURE — 82948 REAGENT STRIP/BLOOD GLUCOSE: CPT

## 2024-01-18 PROCEDURE — 25010000002 FUROSEMIDE PER 20 MG: Performed by: STUDENT IN AN ORGANIZED HEALTH CARE EDUCATION/TRAINING PROGRAM

## 2024-01-18 PROCEDURE — 63710000001 INSULIN REGULAR HUMAN PER 5 UNITS: Performed by: STUDENT IN AN ORGANIZED HEALTH CARE EDUCATION/TRAINING PROGRAM

## 2024-01-18 PROCEDURE — 99232 SBSQ HOSP IP/OBS MODERATE 35: CPT | Performed by: STUDENT IN AN ORGANIZED HEALTH CARE EDUCATION/TRAINING PROGRAM

## 2024-01-18 PROCEDURE — 83735 ASSAY OF MAGNESIUM: CPT | Performed by: STUDENT IN AN ORGANIZED HEALTH CARE EDUCATION/TRAINING PROGRAM

## 2024-01-18 PROCEDURE — 25010000002 VANCOMYCIN 10 G RECONSTITUTED SOLUTION: Performed by: STUDENT IN AN ORGANIZED HEALTH CARE EDUCATION/TRAINING PROGRAM

## 2024-01-18 PROCEDURE — 97530 THERAPEUTIC ACTIVITIES: CPT

## 2024-01-18 PROCEDURE — 80048 BASIC METABOLIC PNL TOTAL CA: CPT | Performed by: STUDENT IN AN ORGANIZED HEALTH CARE EDUCATION/TRAINING PROGRAM

## 2024-01-18 PROCEDURE — 25010000002 CEFEPIME PER 500 MG: Performed by: STUDENT IN AN ORGANIZED HEALTH CARE EDUCATION/TRAINING PROGRAM

## 2024-01-18 PROCEDURE — 25010000002 HEPARIN (PORCINE) PER 1000 UNITS: Performed by: STUDENT IN AN ORGANIZED HEALTH CARE EDUCATION/TRAINING PROGRAM

## 2024-01-18 PROCEDURE — 25810000003 SODIUM CHLORIDE 0.9 % SOLUTION: Performed by: STUDENT IN AN ORGANIZED HEALTH CARE EDUCATION/TRAINING PROGRAM

## 2024-01-18 PROCEDURE — 85025 COMPLETE CBC W/AUTO DIFF WBC: CPT | Performed by: STUDENT IN AN ORGANIZED HEALTH CARE EDUCATION/TRAINING PROGRAM

## 2024-01-18 RX ORDER — INSULIN LISPRO 100 [IU]/ML
2-7 INJECTION, SOLUTION INTRAVENOUS; SUBCUTANEOUS
Status: DISCONTINUED | OUTPATIENT
Start: 2024-01-19 | End: 2024-01-21 | Stop reason: HOSPADM

## 2024-01-18 RX ORDER — AMLODIPINE BESYLATE 10 MG/1
10 TABLET ORAL
Status: DISCONTINUED | OUTPATIENT
Start: 2024-01-19 | End: 2024-01-21 | Stop reason: HOSPADM

## 2024-01-18 RX ORDER — NICOTINE POLACRILEX 4 MG
15 LOZENGE BUCCAL
Status: DISCONTINUED | OUTPATIENT
Start: 2024-01-18 | End: 2024-01-21 | Stop reason: HOSPADM

## 2024-01-18 RX ORDER — DEXTROSE MONOHYDRATE 25 G/50ML
25 INJECTION, SOLUTION INTRAVENOUS
Status: DISCONTINUED | OUTPATIENT
Start: 2024-01-18 | End: 2024-01-21 | Stop reason: HOSPADM

## 2024-01-18 RX ORDER — IBUPROFEN 600 MG/1
1 TABLET ORAL
Status: DISCONTINUED | OUTPATIENT
Start: 2024-01-18 | End: 2024-01-21 | Stop reason: HOSPADM

## 2024-01-18 RX ORDER — LINEZOLID 600 MG/1
600 TABLET, FILM COATED ORAL EVERY 12 HOURS SCHEDULED
Status: DISCONTINUED | OUTPATIENT
Start: 2024-01-18 | End: 2024-01-21 | Stop reason: HOSPADM

## 2024-01-18 RX ADMIN — VANCOMYCIN HYDROCHLORIDE 1250 MG: 10 INJECTION, POWDER, LYOPHILIZED, FOR SOLUTION INTRAVENOUS at 09:10

## 2024-01-18 RX ADMIN — SENNOSIDES AND DOCUSATE SODIUM 2 TABLET: 8.6; 5 TABLET ORAL at 09:03

## 2024-01-18 RX ADMIN — CARVEDILOL 25 MG: 12.5 TABLET, FILM COATED ORAL at 09:02

## 2024-01-18 RX ADMIN — AMLODIPINE BESYLATE 5 MG: 5 TABLET ORAL at 09:03

## 2024-01-18 RX ADMIN — INSULIN HUMAN 3 UNITS: 100 INJECTION, SOLUTION PARENTERAL at 00:22

## 2024-01-18 RX ADMIN — BRIMONIDINE TARTRATE 1 DROP: 2 SOLUTION OPHTHALMIC at 09:04

## 2024-01-18 RX ADMIN — Medication 10 ML: at 09:04

## 2024-01-18 RX ADMIN — FAMOTIDINE 20 MG: 20 TABLET, FILM COATED ORAL at 09:03

## 2024-01-18 RX ADMIN — Medication 6000 UNITS: at 09:03

## 2024-01-18 RX ADMIN — INSULIN HUMAN 3 UNITS: 100 INJECTION, SOLUTION PARENTERAL at 11:37

## 2024-01-18 RX ADMIN — DIVALPROEX SODIUM 375 MG: 125 TABLET, DELAYED RELEASE ORAL at 21:57

## 2024-01-18 RX ADMIN — HEPARIN SODIUM 5000 UNITS: 5000 INJECTION INTRAVENOUS; SUBCUTANEOUS at 15:13

## 2024-01-18 RX ADMIN — Medication 5 MG: at 21:57

## 2024-01-18 RX ADMIN — FLUOXETINE 20 MG: 20 CAPSULE ORAL at 09:03

## 2024-01-18 RX ADMIN — CEFEPIME 2000 MG: 2 INJECTION, POWDER, FOR SOLUTION INTRAVENOUS at 11:37

## 2024-01-18 RX ADMIN — ATORVASTATIN CALCIUM 20 MG: 20 TABLET, FILM COATED ORAL at 21:57

## 2024-01-18 RX ADMIN — TIMOLOL MALEATE 1 DROP: 5 SOLUTION/ DROPS OPHTHALMIC at 22:00

## 2024-01-18 RX ADMIN — DIVALPROEX SODIUM 375 MG: 125 TABLET, DELAYED RELEASE ORAL at 09:03

## 2024-01-18 RX ADMIN — TIMOLOL MALEATE 1 DROP: 5 SOLUTION/ DROPS OPHTHALMIC at 09:04

## 2024-01-18 RX ADMIN — HEPARIN SODIUM 5000 UNITS: 5000 INJECTION INTRAVENOUS; SUBCUTANEOUS at 05:36

## 2024-01-18 RX ADMIN — THIAMINE HCL TAB 100 MG 100 MG: 100 TAB at 09:03

## 2024-01-18 RX ADMIN — HEPARIN SODIUM 5000 UNITS: 5000 INJECTION INTRAVENOUS; SUBCUTANEOUS at 21:56

## 2024-01-18 RX ADMIN — THIAMINE HCL TAB 100 MG 100 MG: 100 TAB at 21:56

## 2024-01-18 RX ADMIN — Medication 10 ML: at 21:59

## 2024-01-18 RX ADMIN — FUROSEMIDE 40 MG: 10 INJECTION, SOLUTION INTRAMUSCULAR; INTRAVENOUS at 05:35

## 2024-01-18 RX ADMIN — FERROUS SULFATE TAB 325 MG (65 MG ELEMENTAL FE) 325 MG: 325 (65 FE) TAB at 09:02

## 2024-01-18 RX ADMIN — LINEZOLID 600 MG: 600 TABLET, FILM COATED ORAL at 21:57

## 2024-01-18 RX ADMIN — CARVEDILOL 25 MG: 12.5 TABLET, FILM COATED ORAL at 18:28

## 2024-01-18 RX ADMIN — LINEZOLID 600 MG: 600 TABLET, FILM COATED ORAL at 11:38

## 2024-01-18 RX ADMIN — THIAMINE HCL TAB 100 MG 100 MG: 100 TAB at 15:40

## 2024-01-18 RX ADMIN — BRIMONIDINE TARTRATE 1 DROP: 2 SOLUTION OPHTHALMIC at 22:00

## 2024-01-18 RX ADMIN — INSULIN HUMAN 3 UNITS: 100 INJECTION, SOLUTION PARENTERAL at 18:28

## 2024-01-18 NOTE — PLAN OF CARE
Goal Outcome Evaluation:  Plan of Care Reviewed With: patient        Progress: improving  Outcome Evaluation: Patient presents with impaired cognition, balance, activity tolerance, coordination, ROM and strength along with possible sensory loss impacting ADL independence and related transfers. Pt. appropriate for continued skilled IP OT services to address deficit areas and promote return to PLOF.  Recommend return  to SNF at discharge if pt. not at baseline ADL and transfer independence level.      Anticipated Discharge Disposition (OT): skilled nursing facility

## 2024-01-18 NOTE — PLAN OF CARE
Goal Outcome Evaluation:  Plan of Care Reviewed With: patient        Progress: improving  Outcome Evaluation: Patient gave good effort towards transfer training, requiring Mod A x2 and BUE support to safely transfer to the chair. He continues to demonstrate posterior lean in supported and unsupported positions. Continue skilled IP PT services to support return to baseline. Recommend SNF at d/c.      Anticipated Discharge Disposition (PT): skilled nursing facility

## 2024-01-18 NOTE — PROGRESS NOTES
[facility] Medicine Services  PROGRESS NOTE    Patient Name: Omkar Nava  : 1955  MRN: [id number]    Date of Admission: 1/15/2024  Primary Care Physician: Deann Cao MD    Subjective   Subjective     CC:  F/u dyspnea    HPI:  Denied chest pain or shortness of breath.  Denied cough.  Had a bowel movement yesterday.  Denied any GI symptoms.  Denied any GI symptoms.    Objective   Objective     Vital Signs:   Temp:  [97.9 °F (36.6 °C)-98.7 °F (37.1 °C)] 98.5 °F (36.9 °C)  Heart Rate:  [59-73] 72  Resp:  [18] 18  BP: (162-185)/(70-79) 167/73  Flow (L/min):  [2] 2     Physical Exam:  Constitutional: No acute distress, awake, alert  HENT: NCAT, mucous membranes moist  Respiratory: Bibasilar crackles, respiratory effort normal   Cardiovascular: RRR, no murmurs, rubs, or gallops  Gastrointestinal: Positive bowel sounds, soft, nontender, nondistended  Musculoskeletal: No bilateral ankle edema  Psychiatric: Appropriate affect, cooperative  Neurologic: PERRL, symmetric facies, speech clear  Skin: No rashes        Results Reviewed:  LAB RESULTS:      Lab 24  0412 24  0404 24  1744 24  0709 01/15/24  0616 01/15/24  0325 01/15/24  0118   WBC 6.42 6.60  --  5.90 8.26  --  8.26   HEMOGLOBIN 7.8* 7.4* 8.0* 6.4* 7.1*  --  7.6*   HEMATOCRIT 24.0* 23.3* 24.7* 20.7* 22.3*  --  25.0*   PLATELETS 198 162  --  152 153  --  155   NEUTROS ABS 3.84 3.92  --  3.39  --   --  6.42   IMMATURE GRANS (ABS) 0.02 0.01  --  0.02  --   --  0.03   LYMPHS ABS 1.46 1.63  --  1.50  --   --  0.79   MONOS ABS 0.87 0.80  --  0.82  --   --  0.85   EOS ABS 0.20 0.22  --  0.15  --   --  0.15   MCV 91.3 91.0  --  94.5 94.9  --  98.0*   PROCALCITONIN  --   --   --   --   --  0.26*  --    LACTATE  --   --   --   --   --   --  1.6   D DIMER QUANT  --   --   --   --   --   --  0.83*         Lab 24  0412 24  0404 24  0709 01/15/24  1143 01/15/24  0614   SODIUM 144 138 143 144  140   POTASSIUM 4.1 4.0 4.1 4.8 5.1   CHLORIDE 106 102 107 109* 107   CO2 30.0* 26.0 26.0 25.0 25.0   ANION GAP 8.0 10.0 10.0 10.0 8.0   BUN 31* 30* 26* 33* 32*   CREATININE 1.64* 1.49* 1.52* 1.60* 1.56*   EGFR 45.8* 51.4* 50.2* 47.2* 48.7*   GLUCOSE 109* 145* 94 186* 245*   CALCIUM 8.6 8.5* 8.4* 8.7 8.4*   MAGNESIUM 2.0 1.9 2.0  --  2.4         Lab 01/15/24  0118   TOTAL PROTEIN 7.0   ALBUMIN 3.5   GLOBULIN 3.5   ALT (SGPT) 47*   AST (SGOT) 32   BILIRUBIN 0.2   ALK PHOS 110         Lab 01/15/24  0614 01/15/24  0325 01/15/24  0118   PROBNP  --   --  905.7*   HSTROP T 73* 97* 83*             Lab 01/16/24  0833 01/15/24  0614   IRON  --  17*   IRON SATURATION (TSAT)  --  6*   TIBC  --  289*   TRANSFERRIN  --  194*   FERRITIN  --  312.80   ABO TYPING O  --    RH TYPING Positive  --    ANTIBODY SCREEN Negative  --          Lab 01/15/24  0204   PH, ARTERIAL 7.366   PCO2, ARTERIAL 43.2   PO2 ART 87.8   FIO2 45   HCO3 ART 24.7   BASE EXCESS ART -0.7*   CARBOXYHEMOGLOBIN 1.9     Brief Urine Lab Results  (Last result in the past 365 days)        Color   Clarity   Blood   Leuk Est   Nitrite   Protein   CREAT   Urine HCG        08/02/23 1319 Yellow   Clear   Negative   Negative   Negative   30 mg/dL (1+)                   Microbiology Results Abnormal       Procedure Component Value - Date/Time    Blood Culture - Blood, Arm, Left [[id number]]  (Normal) Collected: 01/15/24 0118    Lab Status: Preliminary result Specimen: Blood from Arm, Left Updated: 01/18/24 0245     Blood Culture No growth at 3 days    Blood Culture - Blood, Arm, Right [[id number]]  (Normal) Collected: 01/15/24 0118    Lab Status: Preliminary result Specimen: Blood from Arm, Right Updated: 01/18/24 0245     Blood Culture No growth at 3 days    S. Pneumo Ag Urine or CSF - Urine, Urine, Clean Catch [[id number]]  (Normal) Collected: 01/15/24 0250    Lab Status: Final result Specimen: Urine, Clean Catch Updated: 01/15/24 1102     Strep Pneumo Ag Negative     Legionella Antigen, Urine - Urine, Urine, Clean Catch [[id number]]  (Normal) Collected: 01/15/24 0250    Lab Status: Final result Specimen: Urine, Clean Catch Updated: 01/15/24 1102     LEGIONELLA ANTIGEN, URINE Negative    Respiratory Panel PCR w/COVID-19(SARS-CoV-2) [facility] In-House, NP Swab in UTM/VTM, 2 HR TAT - Swab, Nasopharynx [[id number]]  (Normal) Collected: 01/15/24 0115    Lab Status: Final result Specimen: Swab from Nasopharynx Updated: 01/15/24 0225     ADENOVIRUS, PCR Not Detected     Coronavirus 229E Not Detected     Coronavirus HKU1 Not Detected     Coronavirus NL63 Not Detected     Coronavirus OC43 Not Detected     COVID19 Not Detected     Human Metapneumovirus Not Detected     Human Rhinovirus/Enterovirus Not Detected     Influenza A PCR Not Detected     Influenza B PCR Not Detected     Parainfluenza Virus 1 Not Detected     Parainfluenza Virus 2 Not Detected     Parainfluenza Virus 3 Not Detected     Parainfluenza Virus 4 Not Detected     RSV, PCR Not Detected     Bordetella pertussis pcr Not Detected     Bordetella parapertussis PCR Not Detected     Chlamydophila pneumoniae PCR Not Detected     Mycoplasma pneumo by PCR Not Detected    Narrative:      In the setting of a positive respiratory panel with a viral infection PLUS a negative procalcitonin without other underlying concern for bacterial infection, consider observing off antibiotics or discontinuation of antibiotics and continue supportive care. If the respiratory panel is positive for atypical bacterial infection (Bordetella pertussis, Chlamydophila pneumoniae, or Mycoplasma pneumoniae), consider antibiotic de-escalation to target atypical bacterial infection.            Adult Transthoracic Echo Complete With Contrast if Necessary Per Protocol    Result Date: 1/17/2024    Left ventricular ejection fraction appears to be 56 - 60%.   Left ventricular wall thickness is consistent with hypertrophy.   Estimated right ventricular  systolic pressure from tricuspid regurgitation is mildly elevated (35-45 mmHg).   There is a small (1-2cm) pericardial effusion.   No evidence for pericardial tamponade      Results for orders placed during the hospital encounter of 01/15/24    Adult Transthoracic Echo Complete With Contrast if Necessary Per Protocol    Interpretation Summary    Left ventricular ejection fraction appears to be 56 - 60%.    Left ventricular wall thickness is consistent with hypertrophy.    Estimated right ventricular systolic pressure from tricuspid regurgitation is mildly elevated (35-45 mmHg).    There is a small (1-2cm) pericardial effusion.    No evidence for pericardial tamponade      Current medications:  Scheduled Meds:[START ON 1/19/2024] amLODIPine, 10 mg, Oral, Q24H  atorvastatin, 20 mg, Oral, Nightly  brimonidine, 1 drop, Both Eyes, BID   And  timolol, 1 drop, Both Eyes, BID  carvedilol, 25 mg, Oral, BID With Meals  cefepime, 2,000 mg, Intravenous, Q12H  cholecalciferol, 6,000 Units, Oral, Daily  divalproex, 375 mg, Oral, BID  famotidine, 20 mg, Oral, Daily  ferrous sulfate, 325 mg, Oral, Daily With Breakfast  [Held by provider] FLUoxetine, 20 mg, Oral, BID  [Held by provider] furosemide, 40 mg, Intravenous, BID  heparin (porcine), 5,000 Units, Subcutaneous, Q8H  [Held by provider] hydrALAZINE, 75 mg, Oral, Q8H  insulin regular, 2-7 Units, Subcutaneous, Q6H  linezolid, 600 mg, Oral, Q12H  melatonin, 5 mg, Oral, Nightly  senna-docusate sodium, 2 tablet, Oral, BID  sodium chloride, 10 mL, Intravenous, Q12H  thiamine, 100 mg, Oral, TID  [Held by provider] traZODone, 25 mg, Oral, Nightly      Continuous Infusions:     PRN Meds:.  acetaminophen    albuterol    senna-docusate sodium **AND** polyethylene glycol **AND** bisacodyl **AND** bisacodyl    Calcium Replacement - Follow Nurse / BPA Driven Protocol    dextrose    dextrose    glucagon (human recombinant)    hydrOXYzine    Magnesium Standard Dose Replacement - Follow Nurse /  BPA Driven Protocol    Phosphorus Replacement - Follow Nurse / BPA Driven Protocol    Potassium Replacement - Follow Nurse / BPA Driven Protocol    sodium chloride    sodium chloride    sodium chloride    sodium chloride    Assessment & Plan   Assessment & Plan     Active Hospital Problems    Diagnosis  POA    **Hypoxia [R09.02]  Yes    Aspiration pneumonia [J69.0]  Yes    Dementia [F03.90]  Yes    S/P transmetatarsal amputation of foot, left [Z89.432]  Not Applicable    Anemia of chronic disease [D63.8]  Yes    Personal history of Methicillin resistant Staphylococcus aureus infection [Z86.14]  Yes    Neurocognitive disorder [R41.9]  Yes    Type 2 diabetes mellitus [E11.9]  Yes    Essential hypertension [I10]  Yes    Psychotic disorder [F29]  Yes    Type 2 diabetes mellitus with diabetic neuropathy, unspecified [E11.40]  Yes    Type 2 diabetes mellitus with diabetic chronic kidney disease [E11.22]  Yes      Resolved Hospital Problems   No resolved problems to display.        Brief Hospital Course to date:  Omkar Nava is a 68 y.o. male with history of chronic systolic heart failure, type 2 diabetes on insulin, anemia, hypertension, CKD, history of osteomyelitis status post left TMA, dementia presenting with acute hypoxemic respiratory failure.  He was admitted to hospitalist service with concern for multifocal pneumonia.    This patient's problems and plans were partially entered by my partner and updated as appropriate by me 01/18/24.    Acute on chronic hypoxemic respiratory failure secondary to,  Multifocal pneumonia with risk factors for healthcare associated infection  Acute on chronic exacerbation of systolic heart failure  -initially required HFNC, now on NC and seems relatively stable  -- Chest x-ray shows multifocal opacities greater on right than left concerning for multifocal pneumonia versus asymmetric pulmonary edema.    --placed on vanc (transitioned to linezolid on 1/18), cefepime, doxy for concern  of healthcare acquired PNA w recent facility stay. Resp panel neg, legionella and strep neg. MRSA detected. Blood cultures in process  --Holding home torsemide; Cr trended up, holding additional IV diuresis today  -Echo shows EF of 56 to 60% and left ventricle hypertrophy.  -Continue home carvedilol.  He no longer appears to be taking SGLT2 or ACE inhibitor.  Will see where creatinine settles out, if improving can optimize guideline directed medical therapy     Acute kidney injury on CKD 3  -Baseline creatinine appears to be 1.3, he is 1.64 on admission and now trending back up  -Holding diuresis and transitioned to linezolid     Elevated serum troponin level  -Troponin 83 on admission without ischemic changes on EKG or complaints of chest pain.  Trending down.  Suspect demand ischemia secondary to lung pathology and worsened by CKD     Anemia of chronic disease  -Baseline hemoglobin around 8, he is 7.6 on admission.  No signs of active bleeding.  iron is low, panel overall consistent w AOCD. Given new O2 requirement he was transfused on 1/16 1 unit of packed red blood cells. May benefit from IV iron before dc after acute infection issues     Hypertension  -added back amlodipine and coreg from home regimen, hold torsemide as he is receiving diuresis IV     Type 2 diabetes  -Uses 6 units of insulin glargine at skilled nursing facility.  continue SSI for now     History of dementia with behavioral disturbance  Insomnia  -Continue home medications  --complicates all aspects of care, high risk of hospital delirium    Expected Discharge Location and Transportation: back to [facility]  Expected Discharge   Expected Discharge Date: 1/19/2024; Expected Discharge Time:      DVT prophylaxis:  Medical DVT prophylaxis orders are present.     AM-PAC 6 Clicks Score (PT): 13 (01/17/24 3870)    CODE STATUS:   Code Status and Medical Interventions:   Ordered at: 01/15/24 7498     Code Status (Patient has no pulse and is not  breathing):    CPR (Attempt to Resuscitate)     Medical Interventions (Patient has pulse or is breathing):    Full Support       Alexandra De Leon MD  01/18/24

## 2024-01-18 NOTE — THERAPY TREATMENT NOTE
Patient Name: Omkar Nava  : 1955    MRN: [id number]                              Today's Date: 2024       Admit Date: 1/15/2024    Visit Dx:     ICD-10-CM ICD-9-CM   1. Acute respiratory failure with hypoxia  J96.01 518.81   2. History of dementia  Z86.59 V11.8   3. 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, without long-term current use of insulin    Orthostatic hypotension    Acute osteomyelitis of left foot    Type 2 diabetes mellitus    Essential hypertension    Psychotic disorder    Neurocognitive disorder    S/P transmetatarsal amputation of foot, left    AMS (altered mental status)    Hypoxia    Abscess of left foot    Anemia of chronic disease    Aspiration pneumonia    Cellulitis of left toe    Cellulitis of lower extremity    Cervical spine pain    Chronic kidney disease    Closed head injury without loss of consciousness    Dementia    Dental cavities    Diarrhea of presumed infectious origin    Displaced fracture of proximal phalanx of left great toe, initial encounter for open fracture    Dysphagia    Erectile dysfunction    Fall    Gas gangrene    Generalized muscle weakness    Hallucinations    Hypertensive heart and chronic kidney disease 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 failure, with stage 1 through stage 4 chronic kidney disease, or unspecified chronic kidney disease    Insomnia due to medical condition    Iron deficiency anemia    Klebsiella pneumoniae (k. pneumoniae) as the cause of diseases classified elsewhere    Laceration without foreign body, left foot, subsequent encounter    Long term (current) use of insulin    Other acute osteomyelitis, left ankle and foot    Personal history of Methicillin resistant Staphylococcus aureus infection    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    Protein calorie malnutrition    Simple  chronic bronchitis    Tobacco use    Type 2 diabetes mellitus with diabetic neuropathy, unspecified    Wound infection, posttraumatic    Type 2 diabetes mellitus with diabetic chronic kidney disease     Past Medical History:   Diagnosis Date    Anemia     Dementia     Diabetes mellitus     Dysphagia     GERD (gastroesophageal reflux disease)     History of alcohol abuse     History of cocaine use     History of marijuana use     Hypertension     Osteomyelitis     Poor historian     records obtained from nursing home records & his family    Visual impairment      Past Surgical History:   Procedure Laterality Date    AMPUTATION FOOT Left 10/18/2022    Procedure: PARTIAL FIRST RAY AMPUTATION LEFT;  Surgeon: Yeison Petty MD;  Location:  [facility];  Service: Orthopedics;  Laterality: Left;    AMPUTATION FOOT Left 12/5/2022    Procedure: Transmetatarsal of Left Foot;  Surgeon: Yeison Petty MD;  Location:  [facility];  Service: Orthopedics;  Laterality: Left;    EYE SURGERY        General Information       Row Name 01/18/24 1313          Physical Therapy Time and Intention    Document Type therapy note (daily note)  -NS     Mode of Treatment physical therapy  -NS       Row Name 01/18/24 1313          General Information    Patient Profile Reviewed yes  -NS     Existing Precautions/Restrictions fall;other (see comments)  remote L tansmetatarsal amputation; legally blind  -NS       Row Name 01/18/24 1313          Cognition    Orientation Status (Cognition) oriented to;person;time;verbal cues/prompts needed for orientation;place  knew it was 2024, able to choose [facility] from a list of choices  -NS       Row Name 01/18/24 1313          Safety Issues, Functional Mobility    Safety Issues Affecting Function (Mobility) insight into deficits/self-awareness;safety precaution awareness;safety precautions follow-through/compliance;sequencing abilities;judgment  -NS     Impairments Affecting Function (Mobility)  balance;cognition;coordination;endurance/activity tolerance;motor planning;visual/perceptual;strength;postural/trunk control  -NS     Cognitive Impairments, Mobility Safety/Performance judgment;problem-solving/reasoning;safety precaution awareness;safety precaution follow-through;sequencing abilities  -NS               User Key  (r) = Recorded By, (t) = Taken By, (c) = Cosigned By      Initials Name Provider Type    Maggi Weiss PT Physical Therapist                   Mobility       Row Name 01/18/24 1358          Transfers    Comment, (Transfers) Verbal and tactile cues for hand and foot placement, B feet blocked due to posterior lean. Pt demonstrated good initiation of movement into standing, with some difficulty noted with advancing each LE to tansfer to chair.  -NS       Row Name 01/18/24 3318          Bed-Chair Transfer    Bed-Chair [place] (Transfers) moderate assist (50% patient effort);2 person assist  -NS     Assistive Device (Bed-Chair Transfers) other (see comments)  BUE support  -NS       Row Name 01/18/24 1848          Sit-Stand Transfer    Sit-Stand [place] (Transfers) moderate assist (50% patient effort);2 person assist  -NS     Assistive Device (Sit-Stand Transfers) other (see comments)  BUE support  -NS               User Key  (r) = Recorded By, (t) = Taken By, (c) = Cosigned By      Initials Name Provider Type    Maggi Weiss PT Physical Therapist                   Obj/Interventions       Row Name 01/18/24 0842          Balance    Balance Assessment sitting static balance;sitting dynamic balance;standing dynamic balance;standing static balance  -NS     Static Sitting Balance minimal assist  -NS     Dynamic Sitting Balance minimal assist  -NS     Position, Sitting Balance unsupported;sitting edge of bed  -NS     Static Standing Balance moderate assist;2-person assist  -NS     Dynamic Standing Balance moderate assist;2-person assist  -NS     Position/Device Used, Standing Balance  supported  -NS     Comment, Balance Posterior lean in unsupported sitting and in supported standing. Able to correct while sitting, more difficulty correcting in standing.  -NS               User Key  (r) = Recorded By, (t) = Taken By, (c) = Cosigned By      Initials Name Provider Type    Maggi Weiss, KYRA Physical Therapist                   Goals/Plan    No documentation.                  Clinical Impression       Row Name 01/18/24 1313          Pain    Pretreatment Pain Rating 0/10 - no pain  -NS     Posttreatment Pain Rating 0/10 - no pain  -NS       Row Name 01/18/24 1313          Plan of Care Review    Plan of Care Reviewed With patient  -NS     Progress improving  -NS     Outcome Evaluation Patient gave good effort towards transfer training, requiring Mod A x2 and BUE support to safely transfer to the chair. He continues to demonstrate posterior lean in supported and unsupported positions. Continue skilled IP PT services to support return to baseline. Recommend SNF at d/c.  -NS       Row Name 01/18/24 1313          Vital Signs    Pretreatment Heart Rate (beats/min) 60  -NS     Posttreatment Heart Rate (beats/min) 63  -NS     Pre SpO2 (%) 99  -NS     O2 Delivery Pre Treatment nasal cannula  -NS     Post SpO2 (%) 98  -NS     O2 Delivery Post Treatment nasal cannula  -NS     Pre Patient Position Sitting  -NS     Intra Patient Position Standing  -NS     Post Patient Position Sitting  -NS       Row Name 01/18/24 1313          Positioning and Restraints    Pre-Treatment Position in bed  -NS     Post Treatment Position chair  -NS     In Chair notified nsg;reclined;call light within reach;encouraged to call for assist;exit alarm on;waffle cushion;on mechanical lift sling;legs elevated;with nsg  -NS               User Key  (r) = Recorded By, (t) = Taken By, (c) = Cosigned By      Initials Name Provider Type    Maggi Weiss, KYRA Physical Therapist                   Outcome Measures       Row Name 01/18/24 4612           How much help from another person do you currently need...    Turning from your back to your side while in flat bed without using bedrails? 3  -NS     Moving from lying on back to sitting on the side of a flat bed without bedrails? 2  -NS     Moving to and from a bed to a chair (including a wheelchair)? 2  -NS     Standing up from a chair using your arms (e.g., wheelchair, bedside chair)? 2  -NS     Climbing 3-5 steps with a railing? 1  -NS     To walk in hospital room? 1  -NS     AM-PAC 6 Clicks Score (PT) 11  -NS     Highest Level of Mobility Goal 4 --> Transfer to chair/commode  -NS       Row Name 01/18/24 1352 01/18/24 1313       Functional Assessment    Outcome Measure Options AM-PAC 6 Clicks Daily Activity (OT)  -AF AM-PAC 6 Clicks Basic Mobility (PT)  -NS              User Key  (r) = Recorded By, (t) = Taken By, (c) = Cosigned By      Initials Name Provider Type    Maggi Weiss, PT Physical Therapist    AF Wendy Dominguez OT Occupational Therapist                                 Physical Therapy Education       Title: PT OT SLP Therapies (In Progress)       Topic: Physical Therapy (In Progress)       Point: Mobility training (In Progress)       Learning Progress Summary             Patient Acceptance, E, NR by NS at 1/18/2024 1403    Acceptance, E,D, NR by MB at 1/16/2024 1605                         Point: Home exercise program (In Progress)       Learning Progress Summary             Patient Acceptance, E,D, NR by MB at 1/16/2024 1605                         Point: Body mechanics (In Progress)       Learning Progress Summary             Patient Acceptance, E, NR by NS at 1/18/2024 1403    Acceptance, E,D, NR by MB at 1/16/2024 1605                         Point: Precautions (In Progress)       Learning Progress Summary             Patient Acceptance, E, NR by NS at 1/18/2024 1403    Acceptance, E,D, NR by MB at 1/16/2024 1605                                         User Key       Initials Effective  Dates Name Provider Type Discipline    MB 06/16/21 -  Soo Lane PT Physical Therapist PT    NS 06/16/21 -  Maggi Jade PT Physical Therapist PT                  PT Recommendation and Plan     Plan of Care Reviewed With: patient  Progress: improving  Outcome Evaluation: Patient gave good effort towards transfer training, requiring Mod A x2 and BUE support to safely transfer to the chair. He continues to demonstrate posterior lean in supported and unsupported positions. Continue skilled IP PT services to support return to baseline. Recommend SNF at d/c.     Time Calculation:         PT Charges       Row Name 01/18/24 1313             Time Calculation    Start Time 1313  -NS      PT Received On 01/18/24  -NS      PT Goal Re-Cert Due Date 01/26/24  -NS         Timed Charges    04977 - PT Therapeutic Activity Minutes 11  -NS         Total Minutes    Timed Charges Total Minutes 11  -NS       Total Minutes 11  -NS                User Key  (r) = Recorded By, (t) = Taken By, (c) = Cosigned By      Initials Name Provider Type    NS Maggi Jade, KYRA Physical Therapist                  Therapy Charges for Today       Code Description Service Date Service Provider Modifiers Qty    87692224857 HC PT THERAPEUTIC ACT EA 15 MIN 1/18/2024 Maggi Jade, PT GP 1            PT G-Codes  Outcome Measure Options: AM-PAC 6 Clicks Daily Activity (OT)  AM-PAC 6 Clicks Score (PT): 11  AM-PAC 6 Clicks Score (OT): 9  PT Discharge Summary  Anticipated Discharge Disposition (PT): skilled nursing facility    Maggi Jade PT  1/18/2024

## 2024-01-18 NOTE — PLAN OF CARE
Problem: Adjustment to Illness (Heart Failure)  Goal: Optimal Coping  Intervention: Support Psychosocial Response  Description: Acknowledge, normalize and validate the emotional response to current condition and unpredictable nature of disease progression.  Assess and monitor patient and caregiver perspective on quality of life and personal wellbeing; consider palliative care consult to enhance symptom relief and quality of life.  Engage patient in early and ongoing discussion about goals of care. Facilitate shared decision-making regarding goals and advanced care planning.  Assist patient and family with life transitions associated with heart failure (e.g., adherence to medical regimens, impact on family dynamics/roles, end-of-life care); acknowledge caregiver stress.  Recognize current coping strategies and assist in developing new strategies (problem-solving, mind-body techniques).  Assess and monitor for signs and symptoms of anxiety and depression.  Recent Flowsheet Documentation  Taken 1/18/2024 0410 by Mila Lance RN  Supportive Measures:   active listening utilized   counseling provided   decision-making supported   positive reinforcement provided   self-care encouraged   self-reflection promoted   self-responsibility promoted   verbalization of feelings encouraged  Family/Support System Care:   self-care encouraged   support provided  Taken 1/18/2024 0225 by Mila Lance, RN  Supportive Measures:   active listening utilized   counseling provided   decision-making supported   positive reinforcement provided   self-care encouraged   self-reflection promoted   self-responsibility promoted   verbalization of feelings encouraged  Family/Support System Care:   self-care encouraged   support provided  Taken 1/18/2024 0022 by Mila Lance RN  Supportive Measures:   active listening utilized   counseling provided   decision-making supported   positive reinforcement provided   self-care encouraged    self-reflection promoted   self-responsibility promoted   verbalization of feelings encouraged  Family/Support System Care:   self-care encouraged   support provided  Taken 1/17/2024 2200 by Mila Lance RN  Supportive Measures:   active listening utilized   counseling provided   decision-making supported   positive reinforcement provided   self-care encouraged   self-reflection promoted   self-responsibility promoted   verbalization of feelings encouraged  Family/Support System Care:   self-care encouraged   support provided  Taken 1/17/2024 2125 by Mila Lance RN  Supportive Measures:   active listening utilized   counseling provided   decision-making supported   positive reinforcement provided   relaxation techniques promoted   self-reflection promoted   self-care encouraged   self-responsibility promoted   verbalization of feelings encouraged  Family/Support System Care:   support provided   self-care encouraged  Taken 1/17/2024 2035 by Mila Lance RN  Supportive Measures:   active listening utilized   counseling provided   decision-making supported   positive reinforcement provided   self-care encouraged   self-reflection promoted   self-responsibility promoted   verbalization of feelings encouraged  Family/Support System Care:   self-care encouraged   support provided     Problem: Functional Ability Impaired (Heart Failure)  Goal: Optimal Functional Ability  Outcome: Ongoing, Progressing  Intervention: Optimize Functional Ability  Description: Assess functional ability and cognition; consider social history, including living environment, roles and social engagement, to identify risk or presence of functional decline; routinely reassess during hospitalization to identify any change.  Facilitate physical activity and exercise to improve functional ability, cognition and quality of life, as well as minimize functional decline associated with inactivity; encourage optimal functional mobility.  Consider  NMES (neuromuscular electrical stimulation) of the lower extremities for patients with limitations in ability to participate in active exercise.  Monitor physiologic response to activity or exercise and adjust accordingly; provide a warm-up and cool-down with activity.  Cluster, coordinate and organize care schedule per patient preference, priorities and tolerance.  Preplan and pace activity; balance activity with periods of rest; incorporate energy-conservation techniques.  Maintain an accessible environment to facilitate safe activity; position for optimal comfort and activity tolerance (e.g., sitting for self-care).  Encourage self-care performance to promote maximum independence in daily activity; provide adaptive equipment and rest periods if needed.  Offer personal aids, such as glasses, hearing aids and dentures; encourage familiar home items and neurosensory stimulation activities to prevent isolation and sensory deprivation.  Recent Flowsheet Documentation  Taken 1/18/2024 0410 by Mila Lance RN  Activity Management: activity minimized  Self-Care Promotion:   BADL personal objects within reach   BADL personal routines maintained  Taken 1/18/2024 0225 by Mila Lance RN  Activity Management: activity minimized  Self-Care Promotion:   BADL personal objects within reach   BADL personal routines maintained  Taken 1/18/2024 0022 by Mila Lance RN  Activity Management: activity minimized  Self-Care Promotion:   BADL personal objects within reach   BADL personal routines maintained  Taken 1/17/2024 2200 by Mila Lance RN  Activity Management: activity minimized  Self-Care Promotion:   BADL personal objects within reach   BADL personal routines maintained  Taken 1/17/2024 2125 by Mlia Lance RN  Activity Management: activity minimized  Self-Care Promotion:   BADL personal objects within reach   BADL personal routines maintained  Taken 1/17/2024 2035 by Mila Lance RN  Activity Management:  activity minimized  Self-Care Promotion:   BADL personal objects within reach   BADL personal routines maintained     Problem: Fall Injury Risk  Goal: Absence of Fall and Fall-Related Injury  Outcome: Ongoing, Progressing  Intervention: Identify and Manage Contributors  Description: Develop a fall prevention plan with the patient and caregiver/family.  Provide reorientation, appropriate sensory stimulation and routines with changes in mental status to decrease risk of fall.  Promote use of personal vision and auditory aids.  Assess assistance level required for safe and effective self-care; provide support as needed, such as toileting, mobilization. For age 65 and older, implement timed toileting with assistance.  Encourage physical activity, such as performance of mobility and self-care at highest level of patient ability, multicomponent exercise program and provision of appropriate assistive devices.  If fall occurs, assess the severity of injury; implement fall injury protocol. Determine the cause and revise fall injury prevention plan.  Regularly review medication contribution to fall risk; adjust medication administration times to minimize risk of falling.  Consider risk related to polypharmacy and age.  Balance adequate pain management with potential for oversedation.  Recent Flowsheet Documentation  Taken 1/18/2024 0410 by Mila Lance RN  Medication Review/Management: medications reviewed  Self-Care Promotion:   BADL personal objects within reach   BADL personal routines maintained  Taken 1/18/2024 0225 by Mila Lance RN  Medication Review/Management: medications reviewed  Self-Care Promotion:   BADL personal objects within reach   BADL personal routines maintained  Taken 1/18/2024 0022 by Mila Lance RN  Medication Review/Management: medications reviewed  Self-Care Promotion:   BADL personal objects within reach   BADL personal routines maintained  Taken 1/17/2024 2200 by Mila Lance  RN  Medication Review/Management: medications reviewed  Self-Care Promotion:   BADL personal objects within reach   BADL personal routines maintained  Taken 1/17/2024 2125 by Mila Lance RN  Medication Review/Management: medications reviewed  Self-Care Promotion:   BADL personal objects within reach   BADL personal routines maintained  Taken 1/17/2024 2035 by Mila Lance RN  Medication Review/Management: medications reviewed  Self-Care Promotion:   BADL personal objects within reach   BADL personal routines maintained  Intervention: Promote Injury-Free Environment  Description: Provide a safe, barrier-free environment that encourages independent activity.  Keep care area uncluttered and well-lighted.  Determine need for increased observation or monitoring.  Avoid use of devices that minimize mobility, such as restraints or indwelling urinary catheter.  Recent Flowsheet Documentation  Taken 1/18/2024 0410 by Mila Lance RN  Safety Promotion/Fall Prevention:   activity supervised   assistive device/personal items within reach   clutter free environment maintained   fall prevention program maintained   nonskid shoes/slippers when out of bed   room organization consistent   safety round/check completed   toileting scheduled  Taken 1/18/2024 0225 by Mila Lance RN  Safety Promotion/Fall Prevention:   activity supervised   assistive device/personal items within reach   clutter free environment maintained   fall prevention program maintained   nonskid shoes/slippers when out of bed   room organization consistent   safety round/check completed   toileting scheduled  Taken 1/18/2024 0022 by Mila Lance RN  Safety Promotion/Fall Prevention:   activity supervised   assistive device/personal items within reach   clutter free environment maintained   fall prevention program maintained   nonskid shoes/slippers when out of bed   room organization consistent   safety round/check completed   toileting  scheduled  Taken 1/17/2024 2200 by Mila Lance RN  Safety Promotion/Fall Prevention:   activity supervised   assistive device/personal items within reach   clutter free environment maintained   fall prevention program maintained   nonskid shoes/slippers when out of bed   room organization consistent   safety round/check completed   toileting scheduled  Taken 1/17/2024 2125 by Mila Lance RN  Safety Promotion/Fall Prevention:   activity supervised   assistive device/personal items within reach   clutter free environment maintained   fall prevention program maintained   nonskid shoes/slippers when out of bed   room organization consistent   toileting scheduled   safety round/check completed  Taken 1/17/2024 2035 by Mila Lance RN  Safety Promotion/Fall Prevention:   activity supervised   assistive device/personal items within reach   clutter free environment maintained   fall prevention program maintained   nonskid shoes/slippers when out of bed   room organization consistent   safety round/check completed   toileting scheduled     Problem: Asthma Comorbidity  Goal: Maintenance of Asthma Control  Outcome: Ongoing, Progressing  Intervention: Maintain Asthma Symptom Control  Description: Evaluate adherence to self-management (asthma action plan), such as medication, symptom-control, trigger-avoidance and self-monitoring.  Advocate for continuation of home regimen, including medication, method of delivery, schedule and symptom monitoring; acknowledge preferred modality and routine.  Minimize exposure to potential triggers, such as perfume, cleaning chemicals and all types of smoke.  Assess for proper use of inhaled medication and delivery technique; assist or reinstruct if needed.  Evaluate effectiveness of coping skills; encourage expression of feelings, expectations and concerns related to disease management and quality of life; reinforce education to enhance management plan and wellbeing.  Recent Flowsheet  Documentation  Taken 1/18/2024 0410 by Mila Lance RN  Medication Review/Management: medications reviewed  Taken 1/18/2024 0225 by Mila Lance RN  Medication Review/Management: medications reviewed  Taken 1/18/2024 0022 by Mila Lance RN  Medication Review/Management: medications reviewed  Taken 1/17/2024 2200 by Mila Lance RN  Medication Review/Management: medications reviewed  Taken 1/17/2024 2125 by Mila Lance RN  Medication Review/Management: medications reviewed  Taken 1/17/2024 2035 by Mila Lance RN  Medication Review/Management: medications reviewed     Problem: COPD (Chronic Obstructive Pulmonary Disease) Comorbidity  Goal: Maintenance of COPD Symptom Control  Outcome: Ongoing, Progressing  Intervention: Maintain COPD-Symptom Control  Description: Evaluate adherence to management plan (e.g., medication, trigger avoidance, infection prevention, self-monitoring).  Advocate for continuation of home regimen, including medication, method of delivery, schedule and symptom monitoring.  Anticipate the need for breathing techniques and activity pacing to minimize fatigue and breathlessness.  Assess for proper use of inhaled medication and delivery technique; assist or reinstruct if needed.  Evaluate effectiveness of coping skills; encourage expression of feelings, expectations and concerns related to disease management and quality of life; reinforce education to enhance management plan and wellbeing.  Recent Flowsheet Documentation  Taken 1/18/2024 0410 by Mila Lance RN  Supportive Measures:   active listening utilized   counseling provided   decision-making supported   positive reinforcement provided   self-care encouraged   self-reflection promoted   self-responsibility promoted   verbalization of feelings encouraged  Medication Review/Management: medications reviewed  Taken 1/18/2024 0225 by Mila Lance RN  Supportive Measures:   active listening utilized   counseling provided    decision-making supported   positive reinforcement provided   self-care encouraged   self-reflection promoted   self-responsibility promoted   verbalization of feelings encouraged  Medication Review/Management: medications reviewed  Taken 1/18/2024 0022 by Mila Lance RN  Supportive Measures:   active listening utilized   counseling provided   decision-making supported   positive reinforcement provided   self-care encouraged   self-reflection promoted   self-responsibility promoted   verbalization of feelings encouraged  Medication Review/Management: medications reviewed  Taken 1/17/2024 2200 by Mila Lance RN  Supportive Measures:   active listening utilized   counseling provided   decision-making supported   positive reinforcement provided   self-care encouraged   self-reflection promoted   self-responsibility promoted   verbalization of feelings encouraged  Medication Review/Management: medications reviewed  Taken 1/17/2024 2125 by Mila Lance RN  Supportive Measures:   active listening utilized   counseling provided   decision-making supported   positive reinforcement provided   relaxation techniques promoted   self-reflection promoted   self-care encouraged   self-responsibility promoted   verbalization of feelings encouraged  Medication Review/Management: medications reviewed  Taken 1/17/2024 2035 by Mila Lance RN  Supportive Measures:   active listening utilized   counseling provided   decision-making supported   positive reinforcement provided   self-care encouraged   self-reflection promoted   self-responsibility promoted   verbalization of feelings encouraged  Medication Review/Management: medications reviewed     Problem: Diabetes Comorbidity  Goal: Blood Glucose Level Within Targeted Range  Outcome: Ongoing, Progressing  Intervention: Monitor and Manage Glycemia  Description: Establish target blood glucose levels based on patient-specific factors, such as age, diabetes-related  complications and illness severity.  Document blood glucose levels and monitor trend; advocate for adjustment to keep within targeted range.  Provide pharmacologic therapy to maintain blood glucose levels within targeted range.  Check blood glucose level if there is a change in mental or cognitive status.  Recognize, treat and document hypoglycemia event and potential cause.  Avoid hypoglycemic episodes by advocating for insulin dose adjustment when there is a change in condition, such as illness severity, decreased oral intake, missed or refused meals and snacks, as well as medication change that may include steroid tape  Recent Flowsheet Documentation  Taken 1/18/2024 0022 by Mila Lance RN  Glycemic Management: blood glucose monitored  Taken 1/17/2024 2200 by Mila Lance RN  Glycemic Management: blood glucose monitored  Taken 1/17/2024 2125 by Mila Lance RN  Glycemic Management: blood glucose monitored  Taken 1/17/2024 2035 by Mila Lance RN  Glycemic Management: blood glucose monitored     Problem: Hypertension Comorbidity  Goal: Blood Pressure in Desired Range  Outcome: Ongoing, Progressing  Intervention: Maintain Blood Pressure Management  Description: Evaluate adherence to home antihypertensive regimen (e.g., exercise and activity, diet modification, medication).  Provide scheduled antihypertensive medication; consider administration time and effects (e.g., avoid giving diuretic prior to bedtime).  Monitor response to antihypertensive medication therapy (e.g., blood pressure, electrolyte levels, medication effects).  Minimize risk of orthostatic hypotension; encourage caution with position changes, particularly if elderly.  Recent Flowsheet Documentation  Taken 1/18/2024 0410 by Mila Lance RN  Medication Review/Management: medications reviewed  Taken 1/18/2024 0225 by Mila Lance RN  Medication Review/Management: medications reviewed  Taken 1/18/2024 0022 by Mila Lance  RN  Medication Review/Management: medications reviewed  Taken 1/17/2024 2200 by Mila Lance RN  Medication Review/Management: medications reviewed  Taken 1/17/2024 2125 by Mila Lance RN  Syncope Management: position changed slowly  Medication Review/Management: medications reviewed  Taken 1/17/2024 2035 by Mila Lance RN  Medication Review/Management: medications reviewed     Problem: Pain Chronic (Persistent) (Comorbidity Management)  Goal: Acceptable Pain Control and Functional Ability  Intervention: Optimize Psychosocial Wellbeing  Description: Facilitate patient’s self-control over pain by providing pain information and allowing choices in treatment.  Consider and address emotional response to pain.  Explore and promote use of coping strategies; address barriers to successful coping.  Evaluate and assist with psychosocial, cultural and spiritual factors impacting pain.  Modify pain perception by using techniques, such as distraction, mindfulness, guided imagery, meditation or music.  Assess and monitor for signs and symptoms of behavioral health concerns, such as unhealthy substance use, depression and suicidal ideation.  Consider referral for ongoing coping support, such as cognitive behavioral therapy and mindfulness-based stress reduction.  Recent Flowsheet Documentation  Taken 1/18/2024 0410 by Mila Lance RN  Supportive Measures:   active listening utilized   counseling provided   decision-making supported   positive reinforcement provided   self-care encouraged   self-reflection promoted   self-responsibility promoted   verbalization of feelings encouraged  Diversional Activities:   smartphone   television  Family/Support System Care:   self-care encouraged   support provided  Taken 1/18/2024 0225 by Mila Lance RN  Supportive Measures:   active listening utilized   counseling provided   decision-making supported   positive reinforcement provided   self-care encouraged    self-reflection promoted   self-responsibility promoted   verbalization of feelings encouraged  Diversional Activities:   smartphone   [place]  Family/Support System Care:   self-care encouraged   support provided  Taken 1/18/2024 0022 by Mila Lance RN  Supportive Measures:   active listening utilized   counseling provided   decision-making supported   positive reinforcement provided   self-care encouraged   self-reflection promoted   self-responsibility promoted   verbalization of feelings encouraged  Diversional Activities:   smartphone   [place]  Family/Support System Care:   self-care encouraged   support provided  Taken 1/17/2024 2200 by Mila Lance RN  Supportive Measures:   active listening utilized   counseling provided   decision-making supported   positive reinforcement provided   self-care encouraged   self-reflection promoted   self-responsibility promoted   verbalization of feelings encouraged  Diversional Activities:   smartphone   [place]  Family/Support System Care:   self-care encouraged   support provided  Taken 1/17/2024 2125 by Mila Lance RN  Supportive Measures:   active listening utilized   counseling provided   decision-making supported   positive reinforcement provided   relaxation techniques promoted   self-reflection promoted   self-care encouraged   self-responsibility promoted   verbalization of feelings encouraged  Diversional Activities:   television   Gaia Her[place]  Family/Support System Care:   support provided   self-care encouraged  Taken 1/17/2024 2035 by Mila Lance, RN  Supportive Measures:   active listening utilized   counseling provided   decision-making supported   positive reinforcement provided   self-care encouraged   self-reflection promoted   self-responsibility promoted   verbalization of feelings encouraged  Diversional Activities:   smartphone   [place]  Family/Support System Care:   self-care encouraged   support provided   Goal Outcome  Evaluation:  Plan of Care Reviewed With: patient        Progress: improving  Outcome Evaluation: Pt Alert to self and city/VSS/ 97% on RA. Pt continues to have elevated BP. Denies N/V/pain/dizziness. Antibiotics given as ordered. Continue monitoring.    Pt does have some paranoia and delusions. Mostly pleasant, does well if you introduce yourself, reorient and explain every time.

## 2024-01-18 NOTE — CASE MANAGEMENT/SOCIAL WORK
Continued Stay Note  DEEDEE Chow     Patient Name: Omkar Nava  MRN: [id number]  Today's Date: 1/18/2024    Admit Date: 1/15/2024    Plan: [facility] to LTC   Discharge Plan       Row Name 01/18/24 1207       Plan    Plan [facility] to LTC    Patient/Family in Agreement with Plan other (see comments)    Plan Comments Pt was discussed in Medical Rounds today. Pt is not medically ready for discharge.  spoke with [facility]. She states they can accept pt back as skilled rehab to then transition to a LTC bed. Pt will need stretcher transportation. KEYLA sent request to [facility] for transportation on Saturday. Keyla requests CM to contact her tomorrow for update on D/C day.  will continue to follow.    1230-[facility] Amb contacted CM. They can transport pt on Saturday, by stretcher, at 1015. CM updated Dr. De Leon and pt's RN through messaging.     Final Discharge Disposition Code 03 - skilled nursing facility (SNF)                   Discharge Codes    No documentation.                 Expected Discharge Date and Time       Expected Discharge Date Expected Discharge Time    Jan 19, 2024               Rosemarie Yarbrough, RN

## 2024-01-18 NOTE — THERAPY TREATMENT NOTE
Patient Name: Omkar Nava  : 1955    MRN: [id number]                              Today's Date: 2024       Admit Date: 1/15/2024    Visit Dx:     ICD-10-CM ICD-9-CM   1. Acute respiratory failure with hypoxia  J96.01 518.81   2. History of dementia  Z86.59 V11.8   3. 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, without long-term current use of insulin    Orthostatic hypotension    Acute osteomyelitis of left foot    Type 2 diabetes mellitus    Essential hypertension    Psychotic disorder    Neurocognitive disorder    S/P transmetatarsal amputation of foot, left    AMS (altered mental status)    Hypoxia    Abscess of left foot    Anemia of chronic disease    Aspiration pneumonia    Cellulitis of left toe    Cellulitis of lower extremity    Cervical spine pain    Chronic kidney disease    Closed head injury without loss of consciousness    Dementia    Dental cavities    Diarrhea of presumed infectious origin    Displaced fracture of proximal phalanx of left great toe, initial encounter for open fracture    Dysphagia    Erectile dysfunction    Fall    Gas gangrene    Generalized muscle weakness    Hallucinations    Hypertensive heart and chronic kidney disease 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 failure, with stage 1 through stage 4 chronic kidney disease, or unspecified chronic kidney disease    Insomnia due to medical condition    Iron deficiency anemia    Klebsiella pneumoniae (k. pneumoniae) as the cause of diseases classified elsewhere    Laceration without foreign body, left foot, subsequent encounter    Long term (current) use of insulin    Other acute osteomyelitis, left ankle and foot    Personal history of Methicillin resistant Staphylococcus aureus infection    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    Protein calorie malnutrition    Simple  chronic bronchitis    Tobacco use    Type 2 diabetes mellitus with diabetic neuropathy, unspecified    Wound infection, posttraumatic    Type 2 diabetes mellitus with diabetic chronic kidney disease     Past Medical History:   Diagnosis Date    Anemia     Dementia     Diabetes mellitus     Dysphagia     GERD (gastroesophageal reflux disease)     History of alcohol abuse     History of cocaine use     History of marijuana use     Hypertension     Osteomyelitis     Poor historian     records obtained from nursing home records & his family    Visual impairment      Past Surgical History:   Procedure Laterality Date    AMPUTATION FOOT Left 10/18/2022    Procedure: PARTIAL FIRST RAY AMPUTATION LEFT;  Surgeon: Yeison Petty MD;  Location:  [facility];  Service: Orthopedics;  Laterality: Left;    AMPUTATION FOOT Left 12/5/2022    Procedure: Transmetatarsal of Left Foot;  Surgeon: Yeison Petty MD;  Location:  [facility];  Service: Orthopedics;  Laterality: Left;    EYE SURGERY        General Information       Row Name 01/18/24 1345          OT Time and Intention    Document Type therapy note (daily note)  -AF     Mode of Treatment occupational therapy  -AF       Row Name 01/18/24 4665          General Information    Patient Profile Reviewed yes  -AF     Existing Precautions/Restrictions fall;other (see comments)  remote L transmetatarsal amputation, legally blind  -AF     Barriers to Rehab medically complex;previous functional deficit;cognitive status;visual deficit  -AF       Row Name 01/18/24 6604          Cognition    Orientation Status (Cognition) oriented to;person;time;disoriented to;place;verbal cues/prompts needed for orientation  -AF       Row Name 01/18/24 1341          Safety Issues, Functional Mobility    Safety Issues Affecting Function (Mobility) awareness of need for assistance;insight into deficits/self-awareness;safety precaution awareness;safety precautions follow-through/compliance;sequencing abilities   -AF     Impairments Affecting Function (Mobility) balance;cognition;coordination;endurance/activity tolerance;postural/trunk control;strength  -AF     Cognitive Impairments, Mobility Safety/Performance awareness, need for assistance;insight into deficits/self-awareness;judgment;problem-solving/reasoning;safety precaution awareness;safety precaution follow-through;sequencing abilities  -AF               User Key  (r) = Recorded By, (t) = Taken By, (c) = Cosigned By      Initials Name Provider Type    AF Wendy Dominguez OT Occupational Therapist                     Mobility/ADL's       Row Name 01/18/24 1346          Bed Mobility    Bed Mobility supine-sit  -AF     Supine-Sit [place] (Bed Mobility) minimum assist (75% patient effort);verbal cues  -AF     Bed Mobility, Safety Issues decreased use of arms for pushing/pulling;decreased use of legs for bridging/pushing  -AF     Assistive Device (Bed Mobility) bed rails;head of bed elevated  -AF       Row Name 01/18/24 1346          Transfers    Transfers bed-chair transfer;sit-stand transfer;stand-sit transfer  -AF     Comment, (Transfers) VCs/tactile cues for hand placement and direction of movement.  -AF       Row Name 01/18/24 1346          Bed-Chair Transfer    Bed-Chair [place] (Transfers) maximum assist (25% patient effort);2 person assist;verbal cues;nonverbal cues (demo/gesture)  -AF     Assistive Device (Bed-Chair Transfers) other (see comments)  BUE support  -AF     Comment, (Bed-Chair Transfer) pt. with posterior lean  -AF       Row Name 01/18/24 1346          Sit-Stand Transfer    Sit-Stand [place] (Transfers) moderate assist (50% patient effort);2 person assist;verbal cues;nonverbal cues (demo/gesture)  -AF     Assistive Device (Sit-Stand Transfers) other (see comments)  BUE support  -AF       Row Name 01/18/24 1346          Stand-Sit Transfer    Stand-Sit [place] (Transfers) moderate assist (50% patient effort);2 person assist  -AF      Assistive Device (Stand-Sit Transfers) other (see comments)  BUE support  -AF     Comment, (Stand-Sit Transfer) Posterior lean increases with lower from stand.  -AF       Row Name 01/18/24 1346          Functional Mobility    Functional Mobility- Comment Defer to PT.  -AF               User Key  (r) = Recorded By, (t) = Taken By, (c) = Cosigned By      Initials Name Provider Type    AF Wendy Dominguez OT Occupational Therapist                   Obj/Interventions       Row Name 01/18/24 1348          Motor Skills    Motor Skills functional endurance  -AF     Functional Endurance Fair endurance sitting EOB and completing fxnl transfers/mobility. Rest breaks taken throughout.  -AF       Row Name 01/18/24 1348          Balance    Balance Assessment sitting static balance;sitting dynamic balance;sit to stand dynamic balance;standing static balance  -AF     Static Sitting Balance minimal assist  -AF     Dynamic Sitting Balance minimal assist  -AF     Position, Sitting Balance unsupported;sitting edge of bed  -AF     Static Standing Balance moderate assist;2-person assist  -AF     Dynamic Standing Balance maximum assist;2-person assist  -AF     Position/Device Used, Standing Balance other (see comments)  BUE support  -AF     Balance Interventions sitting;standing;sit to stand;supported;static;dynamic;weight shifting activity  -AF               User Key  (r) = Recorded By, (t) = Taken By, (c) = Cosigned By      Initials Name Provider Type    AF Wendy Dominguez OT Occupational Therapist                   Goals/Plan       Row Name 01/18/24 1352          Therapy Assessment/Plan (OT)    Planned Therapy Interventions (OT) activity tolerance training;adaptive equipment training;BADL retraining;cognitive/visual perception retraining;functional balance retraining;occupation/activity based interventions;patient/caregiver education/training;ROM/therapeutic exercise;strengthening exercise;transfer/mobility retraining  -AF                User Key  (r) = Recorded By, (t) = Taken By, (c) = Cosigned By      Initials Name Provider Type    AF Wendy Dominguez OT Occupational Therapist                   Clinical Impression       Row Name 01/18/24 1350          Pain Assessment    Pretreatment Pain Rating 0/10 - no pain  -AF     Posttreatment Pain Rating 0/10 - no pain  -AF     Pre/Posttreatment Pain Comment pt tolerated.  -AF     Pain Intervention(s) Ambulation/increased activity;Repositioned  -AF     Additional Documentation Pain Scale: FACES Pre/Post-Treatment (Group)  -AF       Row Name 01/18/24 1350          Pain Scale: FACES Pre/Post-Treatment    Pain: FACES Scale, Pretreatment 0-->no hurt  -AF     Posttreatment Pain Rating 0-->no hurt  -AF     Pre/Posttreatment Pain Comment pt tolerated.  -AF       Row Name 01/18/24 1350          Plan of Care Review    Plan of Care Reviewed With patient  -AF     Progress improving  -AF     Outcome Evaluation Patient presents with impaired cognition, balance, activity tolerance, coordination, ROM and strength along with possible sensory loss impacting ADL independence and related transfers. Pt. appropriate for continued skilled IP OT services to address deficit areas and promote return to PLOF.  Recommend return  to SNF at discharge if pt. not at baseline ADL and transfer independence level.  -AF       Row Name 01/18/24 1350          Therapy Assessment/Plan (OT)    Rehab Potential (OT) good, to achieve stated therapy goals  -AF     Criteria for Skilled Therapeutic Interventions Met (OT) yes;meets criteria;skilled treatment is necessary  -AF     Therapy Frequency (OT) daily  -AF       Row Name 01/18/24 1350          Therapy Plan Review/Discharge Plan (OT)    Anticipated Discharge Disposition (OT) skilled nursing facility  -AF       Row Name 01/18/24 1350          Vital Signs    Pretreatment Heart Rate (beats/min) 62  -AF     Intratreatment Heart Rate (beats/min) 70  -AF     Posttreatment Heart Rate (beats/min) 69  -AF      Pre Patient Position Supine  -AF     Intra Patient Position Standing  -AF     Post Patient Position Sitting  -AF       Row Name 01/18/24 1350          Positioning and Restraints    Pre-Treatment Position in bed  -AF     Post Treatment Position chair  -AF     In Chair notified nsg;reclined;with PT;waffle cushion;on mechanical lift sling;call light within reach;encouraged to call for assist;exit alarm on  -AF               User Key  (r) = Recorded By, (t) = Taken By, (c) = Cosigned By      Initials Name Provider Type    Wendy Lam OT Occupational Therapist                   Outcome Measures       Row Name 01/18/24 1352          How much help from another is currently needed...    Putting on and taking off regular lower body clothing? 1  -AF     Bathing (including washing, rinsing, and drying) 1  -AF     Toileting (which includes using toilet bed pan or urinal) 1  -AF     Putting on and taking off regular upper body clothing 2  -AF     Taking care of personal grooming (such as brushing teeth) 2  -AF     Eating meals 2  -AF     AM-PAC 6 Clicks Score (OT) 9  -AF       Row Name 01/18/24 1352          Functional Assessment    Outcome Measure Options AM-PAC 6 Clicks Daily Activity (OT)  -AF               User Key  (r) = Recorded By, (t) = Taken By, (c) = Cosigned By      Initials Name Provider Type    Wendy Lam OT Occupational Therapist                    Occupational Therapy Education       Title: PT OT SLP Therapies (In Progress)       Topic: Occupational Therapy (In Progress)       Point: ADL training (Done)       Description:   Instruct learner(s) on proper safety adaptation and remediation techniques during self care or transfers.   Instruct in proper use of assistive devices.                  Learning Progress Summary             Patient Acceptance, E,TB, VU,NR by KIMBERLY at 1/18/2024 1352    Acceptance, E, NR by CHRISTI at 1/16/2024 1433    Comment: re-orientation, noted deficits, bed mobility and transfer  safety, built up utensil benefit for self feeding                         Point: Home exercise program (Not Started)       Description:   Instruct learner(s) on appropriate technique for monitoring, assisting and/or progressing therapeutic exercises/activities.                  Learner Progress:  Not documented in this visit.              Point: Precautions (Done)       Description:   Instruct learner(s) on prescribed precautions during self-care and functional transfers.                  Learning Progress Summary             Patient Acceptance, E,TB, VU,NR by AF at 1/18/2024 1352    Acceptance, E, NR by CHRISTI at 1/16/2024 1433    Comment: re-orientation, noted deficits, bed mobility and transfer safety, built up utensil benefit for self feeding                         Point: Body mechanics (Done)       Description:   Instruct learner(s) on proper positioning and spine alignment during self-care, functional mobility activities and/or exercises.                  Learning Progress Summary             Patient Acceptance, E,TB, VU,NR by AF at 1/18/2024 1352    Acceptance, E, NR by CHRISTI at 1/16/2024 1433    Comment: re-orientation, noted deficits, bed mobility and transfer safety, built up utensil benefit for self feeding                                         User Key       Initials Effective Dates Name Provider Type Discipline    CHRISTI 07/11/23 -  Steffi Carpenter OT Occupational Therapist OT    AF 08/15/23 -  Wendy Dominguez OT Occupational Therapist OT                  OT Recommendation and Plan  Planned Therapy Interventions (OT): activity tolerance training, adaptive equipment training, BADL retraining, cognitive/visual perception retraining, functional balance retraining, occupation/activity based interventions, patient/caregiver education/training, ROM/therapeutic exercise, strengthening exercise, transfer/mobility retraining  Therapy Frequency (OT): daily  Plan of Care Review  Plan of Care Reviewed With:  patient  Progress: improving  Outcome Evaluation: Patient presents with impaired cognition, balance, activity tolerance, coordination, ROM and strength along with possible sensory loss impacting ADL independence and related transfers. Pt. appropriate for continued skilled IP OT services to address deficit areas and promote return to PLOF.  Recommend return  to SNF at discharge if pt. not at baseline ADL and transfer independence level.     Time Calculation:         Time Calculation- OT       Row Name 01/18/24 1353             Time Calculation- OT    OT Start Time 1315  -AF      OT Received On 01/18/24  -AF      OT Goal Re-Cert Due Date 01/26/24  -AF         Timed Charges    34797 - OT Therapeutic Activity Minutes 22  -AF         Total Minutes    Timed Charges Total Minutes 22  -AF       Total Minutes 22  -AF                User Key  (r) = Recorded By, (t) = Taken By, (c) = Cosigned By      Initials Name Provider Type    AF Wendy Dominguez OT Occupational Therapist                  Therapy Charges for Today       Code Description Service Date Service Provider Modifiers Qty    89341085473 HC OT THERAPEUTIC ACT EA 15 MIN 1/18/2024 Wendy Dominguez OT GO 1                 Wendy Dominguez OT  1/18/2024

## 2024-01-19 LAB
ANION GAP SERPL CALCULATED.3IONS-SCNC: 10 MMOL/L (ref 5–15)
BASOPHILS # BLD AUTO: 0.02 10*3/MM3 (ref 0–0.2)
BASOPHILS NFR BLD AUTO: 0.3 % (ref 0–1.5)
BUN SERPL-MCNC: 29 MG/DL (ref 8–23)
BUN/CREAT SERPL: 20.1 (ref 7–25)
CALCIUM SPEC-SCNC: 9.2 MG/DL (ref 8.6–10.5)
CHLORIDE SERPL-SCNC: 101 MMOL/L (ref 98–107)
CO2 SERPL-SCNC: 28 MMOL/L (ref 22–29)
CREAT SERPL-MCNC: 1.44 MG/DL (ref 0.76–1.27)
DEPRECATED RDW RBC AUTO: 46.3 FL (ref 37–54)
EGFRCR SERPLBLD CKD-EPI 2021: 53.6 ML/MIN/1.73
EOSINOPHIL # BLD AUTO: 0.27 10*3/MM3 (ref 0–0.4)
EOSINOPHIL NFR BLD AUTO: 3.8 % (ref 0.3–6.2)
ERYTHROCYTE [DISTWIDTH] IN BLOOD BY AUTOMATED COUNT: 14.3 % (ref 12.3–15.4)
GLUCOSE BLDC GLUCOMTR-MCNC: 196 MG/DL (ref 70–130)
GLUCOSE BLDC GLUCOMTR-MCNC: 204 MG/DL (ref 70–130)
GLUCOSE BLDC GLUCOMTR-MCNC: 210 MG/DL (ref 70–130)
GLUCOSE BLDC GLUCOMTR-MCNC: 227 MG/DL (ref 70–130)
GLUCOSE SERPL-MCNC: 175 MG/DL (ref 65–99)
HCT VFR BLD AUTO: 25.3 % (ref 37.5–51)
HGB BLD-MCNC: 8.2 G/DL (ref 13–17.7)
IMM GRANULOCYTES # BLD AUTO: 0.02 10*3/MM3 (ref 0–0.05)
IMM GRANULOCYTES NFR BLD AUTO: 0.3 % (ref 0–0.5)
LYMPHOCYTES # BLD AUTO: 1.17 10*3/MM3 (ref 0.7–3.1)
LYMPHOCYTES NFR BLD AUTO: 16.5 % (ref 19.6–45.3)
MCH RBC QN AUTO: 29 PG (ref 26.6–33)
MCHC RBC AUTO-ENTMCNC: 32.4 G/DL (ref 31.5–35.7)
MCV RBC AUTO: 89.4 FL (ref 79–97)
MONOCYTES # BLD AUTO: 0.63 10*3/MM3 (ref 0.1–0.9)
MONOCYTES NFR BLD AUTO: 8.9 % (ref 5–12)
NEUTROPHILS NFR BLD AUTO: 4.96 10*3/MM3 (ref 1.7–7)
NEUTROPHILS NFR BLD AUTO: 70.2 % (ref 42.7–76)
NRBC BLD AUTO-RTO: 0 /100 WBC (ref 0–0.2)
PLATELET # BLD AUTO: 204 10*3/MM3 (ref 140–450)
PMV BLD AUTO: 10.7 FL (ref 6–12)
POTASSIUM SERPL-SCNC: 4 MMOL/L (ref 3.5–5.2)
RBC # BLD AUTO: 2.83 10*6/MM3 (ref 4.14–5.8)
SODIUM SERPL-SCNC: 139 MMOL/L (ref 136–145)
WBC NRBC COR # BLD AUTO: 7.07 10*3/MM3 (ref 3.4–10.8)

## 2024-01-19 PROCEDURE — 25010000002 HEPARIN (PORCINE) PER 1000 UNITS: Performed by: STUDENT IN AN ORGANIZED HEALTH CARE EDUCATION/TRAINING PROGRAM

## 2024-01-19 PROCEDURE — 82948 REAGENT STRIP/BLOOD GLUCOSE: CPT

## 2024-01-19 PROCEDURE — 63710000001 INSULIN LISPRO (HUMAN) PER 5 UNITS: Performed by: STUDENT IN AN ORGANIZED HEALTH CARE EDUCATION/TRAINING PROGRAM

## 2024-01-19 PROCEDURE — 99232 SBSQ HOSP IP/OBS MODERATE 35: CPT | Performed by: STUDENT IN AN ORGANIZED HEALTH CARE EDUCATION/TRAINING PROGRAM

## 2024-01-19 PROCEDURE — 80048 BASIC METABOLIC PNL TOTAL CA: CPT | Performed by: STUDENT IN AN ORGANIZED HEALTH CARE EDUCATION/TRAINING PROGRAM

## 2024-01-19 PROCEDURE — 25010000002 CEFEPIME PER 500 MG: Performed by: STUDENT IN AN ORGANIZED HEALTH CARE EDUCATION/TRAINING PROGRAM

## 2024-01-19 PROCEDURE — 85025 COMPLETE CBC W/AUTO DIFF WBC: CPT | Performed by: STUDENT IN AN ORGANIZED HEALTH CARE EDUCATION/TRAINING PROGRAM

## 2024-01-19 RX ADMIN — HEPARIN SODIUM 5000 UNITS: 5000 INJECTION INTRAVENOUS; SUBCUTANEOUS at 17:10

## 2024-01-19 RX ADMIN — LINEZOLID 600 MG: 600 TABLET, FILM COATED ORAL at 09:25

## 2024-01-19 RX ADMIN — FERROUS SULFATE TAB 325 MG (65 MG ELEMENTAL FE) 325 MG: 325 (65 FE) TAB at 09:25

## 2024-01-19 RX ADMIN — DIVALPROEX SODIUM 375 MG: 125 TABLET, DELAYED RELEASE ORAL at 09:26

## 2024-01-19 RX ADMIN — CARVEDILOL 25 MG: 12.5 TABLET, FILM COATED ORAL at 09:25

## 2024-01-19 RX ADMIN — THIAMINE HCL TAB 100 MG 100 MG: 100 TAB at 21:15

## 2024-01-19 RX ADMIN — SENNOSIDES AND DOCUSATE SODIUM 2 TABLET: 8.6; 5 TABLET ORAL at 21:15

## 2024-01-19 RX ADMIN — DIVALPROEX SODIUM 375 MG: 125 TABLET, DELAYED RELEASE ORAL at 21:15

## 2024-01-19 RX ADMIN — Medication 10 ML: at 21:16

## 2024-01-19 RX ADMIN — BRIMONIDINE TARTRATE 1 DROP: 2 SOLUTION OPHTHALMIC at 21:15

## 2024-01-19 RX ADMIN — THIAMINE HCL TAB 100 MG 100 MG: 100 TAB at 09:25

## 2024-01-19 RX ADMIN — INSULIN LISPRO 2 UNITS: 100 INJECTION, SOLUTION INTRAVENOUS; SUBCUTANEOUS at 09:27

## 2024-01-19 RX ADMIN — TIMOLOL MALEATE 1 DROP: 5 SOLUTION/ DROPS OPHTHALMIC at 09:26

## 2024-01-19 RX ADMIN — AMLODIPINE BESYLATE 10 MG: 10 TABLET ORAL at 09:25

## 2024-01-19 RX ADMIN — CEFEPIME 2000 MG: 2 INJECTION, POWDER, FOR SOLUTION INTRAVENOUS at 12:00

## 2024-01-19 RX ADMIN — HEPARIN SODIUM 5000 UNITS: 5000 INJECTION INTRAVENOUS; SUBCUTANEOUS at 21:15

## 2024-01-19 RX ADMIN — CARVEDILOL 25 MG: 12.5 TABLET, FILM COATED ORAL at 17:10

## 2024-01-19 RX ADMIN — Medication 10 ML: at 09:28

## 2024-01-19 RX ADMIN — THIAMINE HCL TAB 100 MG 100 MG: 100 TAB at 09:10

## 2024-01-19 RX ADMIN — CEFEPIME 2000 MG: 2 INJECTION, POWDER, FOR SOLUTION INTRAVENOUS at 00:19

## 2024-01-19 RX ADMIN — FAMOTIDINE 20 MG: 20 TABLET, FILM COATED ORAL at 09:25

## 2024-01-19 RX ADMIN — INSULIN LISPRO 3 UNITS: 100 INJECTION, SOLUTION INTRAVENOUS; SUBCUTANEOUS at 21:23

## 2024-01-19 RX ADMIN — SENNOSIDES AND DOCUSATE SODIUM 2 TABLET: 8.6; 5 TABLET ORAL at 09:25

## 2024-01-19 RX ADMIN — Medication 6000 UNITS: at 09:25

## 2024-01-19 RX ADMIN — BRIMONIDINE TARTRATE 1 DROP: 2 SOLUTION OPHTHALMIC at 09:26

## 2024-01-19 RX ADMIN — ATORVASTATIN CALCIUM 20 MG: 20 TABLET, FILM COATED ORAL at 21:15

## 2024-01-19 RX ADMIN — LINEZOLID 600 MG: 600 TABLET, FILM COATED ORAL at 21:15

## 2024-01-19 RX ADMIN — TIMOLOL MALEATE 1 DROP: 5 SOLUTION/ DROPS OPHTHALMIC at 21:15

## 2024-01-19 RX ADMIN — Medication 5 MG: at 21:15

## 2024-01-19 RX ADMIN — INSULIN LISPRO 3 UNITS: 100 INJECTION, SOLUTION INTRAVENOUS; SUBCUTANEOUS at 17:11

## 2024-01-19 RX ADMIN — HEPARIN SODIUM 5000 UNITS: 5000 INJECTION INTRAVENOUS; SUBCUTANEOUS at 06:33

## 2024-01-19 NOTE — CASE MANAGEMENT/SOCIAL WORK
Continued Stay Note  [facility]     Patient Name: Omkar Nava  MRN: [id number]  Today's Date: 1/19/2024    Admit Date: 1/15/2024    Plan: [facility] to LTC   Discharge Plan       Row Name 01/19/24 1431       Plan    Plan [facility] to LTC    Patient/Family in Agreement with Plan yes    Plan Comments Possible discharge tomorrow, 1/20 to a skilled bed at [facility]. Tentative transportation arranged with [facility] for 1015. PCS in dropbox. Updated Dr. De Leon & primary RN on tentative transport time. Updated daughter, Idalia via phone. Spoke to patient at bedside this morning. Pleasantly confused to situation. Pharmacy was updated to [facility]. Updated Keyla at [phone]. CM to call Keyla tomorrow morning on discharge plan.  CM will continue to follow.    Final Discharge Disposition Code 03 - skilled nursing facility (SNF)                   Discharge Codes    No documentation.                 Expected Discharge Date and Time       Expected Discharge Date Expected Discharge Time    Jan 20, 2024               Ying Fallon, RN

## 2024-01-19 NOTE — PLAN OF CARE
Goal Outcome Evaluation:  Plan of Care Reviewed With: patient        Progress: improving  Outcome Evaluation: Pt Alert to self only and has occassional delusions/VSS/ 97% on 2L. Pt continues to have elevated BP, edema much better in LEs. Denies N/V/pain/dizziness. Antibiotics given as ordered. Continue monitoring.         Pt. Is pleasant and cooperative despite having delusions. Does well with reorientation. O2 will drop on RA to 87%, especially when flat.

## 2024-01-19 NOTE — PROGRESS NOTES
Addended by: TEREZA VORA on: 1/19/2024 11:11 AM     Modules accepted: Orders     Podiatric Surgery Daily Progress Note  Yasminekiera Velez      Subjective :   Patient seen and examined this am at the bedside. Patient denies any acute overnight events. Patient denies N/V/F/C/SOB. Patient denies calf pain, thigh pain, chest pain. Review of Systems: A 12 point review of symptoms is unremarkable with the exception of the chief complaint. Patient specifically denies nausea, fever, vomiting, chills, shortness of breath, chest pain, abdominal pain, constipation or difficulty urinating. Objective     BP (!) 183/78   Pulse 90   Temp 98.1 °F (36.7 °C) (Oral)   Resp 24   Ht 6' (1.829 m)   Wt 187 lb 6.4 oz (85 kg)   SpO2 93%   BMI 25.42 kg/m²      I/O:  Intake/Output Summary (Last 24 hours) at 9/29/2022 1005  Last data filed at 9/29/2022 0913  Gross per 24 hour   Intake 1040 ml   Output 500 ml   Net 540 ml              Wt Readings from Last 3 Encounters:   09/26/22 187 lb 6.4 oz (85 kg)       LABS:    Recent Labs     09/28/22  0943 09/29/22  0755   WBC 11.5* 12.8*   HGB 8.9* 8.9*   HCT 27.5* 27.1*    391        Recent Labs     09/29/22  0829      K 4.0      CO2 23   BUN 12   CREATININE 0.8        Recent Labs     09/26/22  1342   PROT 7.5   INR 1.18*           LOWER EXTREMITY EXAMINATION  Dressing to left LE intact. No strikethrough noted to the external dressing. No drainage noted to the internal layers of the dressing. VASCULAR: DP and PT pulses are faintly palpable +1/4 b/l. Upon hand-held Doppler examination, DP and PT pulses were noted to be biphasic bilateral. CFT is brisk to the digits of the foot b/l. Skin temperature is warm to cool from proximal to distal to b/l lower extremity. No edema noted. Erythema noted to the left forefoot that tracks proximally to dorsal ankle, erythema noted to be improving. No pain with calf compression b/l. NEUROLOGIC: Gross and epicritic sensation is diminished b/l.  Protective sensation is absent at all pedal sites b/l.    DERMATOLOGIC:   Patient provided verbal consent for photos to be obtained today:     Left lower extremity:            Full-thickness surgical incision noted at the dorsal lateral aspect of the first metatarsal phalangeal joint extending distally through the first webspace to the plantar medial aspect of the left hallux. No purulent drainage expressed. Sanguinous drainage noted. Dusky dermal changes noted to the plantar medial, plantar lateral, and plantar aspect of the left hallux. Within the surgical incision exposed bone is noted. Wound base is granular in nature. No fluctuance, crepitus, or malodor noted. Right lower extremity is a closed soft tissue envelope without any acute signs of infection. MUSCULOSKELETAL: Muscle strength is 5/5 for all pedal groups tested. No pain with palpation of the foot or ankle b/l. Ankle joint ROM is decreased in dorsiflexion with the knee extended. No obvious biomechanical abnormalities. IMAGING:  Xray foot; left:  Narrative   XR FOOT LEFT (MIN 3 VIEWS)       Indication: infection, eval for air        COMPARISON: 9/17/2022       Findings: 3 views of the left foot were obtained. Again identified is a fracture of the proximal phalanx of the great toe. There is increased interval displacement of the fracture fragment with rotation laterally. No additional fracture deformity. No    soft tissue gas or acute abnormality. Impression   Impression: Increased interval displacement of the previously noted fracture fragment of the great toe. No discrete soft tissue gas. CT foot; left:  Narrative   CT FOOT LEFT WO CONTRAST       Indication: Left hallux infection       Technique: Helical CT images of the left foot were acquired without the administration of intravenous contrast media. Axial, coronal and sagittal reformatted images were constructed from the raw data set.  Individualized dose optimization technique was    used in order to meet ALARA standards for radiation dose reduction. In addition to vendor specific dose reduction algorithms, the dose reduction techniques vary based on the specific scanner utilized but frequently include automated exposure control,    adjustment of the mA and/or kV according to patient size, and use of iterative reconstruction technique. COMPARISON: 9/17/2022. Findings: Again identified is fracture at the lateral aspect of the proximal phalanx of the left great toe. Small locules of gas are visualized adjacent to the fracture fragment. Adjacent soft tissue swelling of the left great toe is noted. Bandage    material overlies the plantar aspect of the great toe. No additional fracture deformity. No organized or drainable fluid collection. Impression   Impression: Small locules of soft tissue gas adjacent to the major fracture fragment of the left great toe. Correlation for open fracture versus soft tissue infection recommended. IMPRESSION/RECOMMENDATIONS:    -S/P left foot I&D (DOS 9/27/22)  -Diabetic foot infection, hallux 2/2 traumatic open fracture-improving  -History of open intra-articular fracture at the base of the proximal phalanx of the hallux; Left (9/17/22)  -Diabetes mellitus type 2 with peripheral neuropathy     -Patient examined and evaluated at the bedside   -Hypertensive otherwise VSS.  Leukocytosis noted (12.8)  -ESR 90 .6  -HbA1c 6.2  -Surgical path and final surgical culture pending  -X-ray and CT scan reviewed, impressions noted above  -Wound culture (9/26): MRSA and Corynebacterium stratum  -Surgical tissue culture (9/27): +1 wbc +1 gram positive cocci and +1 gram positive rods  -Continue IV antibiotics per ID recommendations  -ID following; recommend continuing vancomycin and cefepime  -Nonweightbearing to left lower extremity  -Patient will return to the OR possibly this Friday on 9/30 once the wound has converted from dirty to clean and surgical pathology results have come back    DISPO: S/P incision and drainage of left foot (DOS 9/27/22). Surgical path and culture pending. All other labs and imaging reviewed. ID following; recommendations noted above. Patient will return to the OR Friday for further debridement and possible definitive closure.   We will continue to monitor    Discussed assessment and plan with Dr. Sarita March, Charlee Primrose, DPM  Podiatric Resident PGY-1  Pager [phone] or Perfect Serve

## 2024-01-19 NOTE — PROGRESS NOTES
[facility] Medicine Services  PROGRESS NOTE    Patient Name: Omkar Nava  : 1955  MRN: [id number]    Date of Admission: 1/15/2024  Primary Care Physician: Deann Cao MD    Subjective   Subjective     CC:  F/u dyspnea    HPI:  Pleasantly confused.  Denies any pain or cough.  No GI or  symptoms.    Objective   Objective     Vital Signs:   Temp:  [97.6 °F (36.4 °C)-98.5 °F (36.9 °C)] 97.6 °F (36.4 °C)  Heart Rate:  [57-72] 58  Resp:  [18] 18  BP: (159-167)/(70-78) 167/73  Flow (L/min):  [2] 2     Physical Exam:  Constitutional: No acute distress, awake, alert  HENT: NCAT, mucous membranes moist  Respiratory: Bibasilar crackles, respiratory effort normal   Cardiovascular: RRR, no murmurs, rubs, or gallops  Gastrointestinal: Normoactive bowel sounds, soft, nontender, nondistended  Musculoskeletal: No bilateral ankle edema  Psychiatric: Appropriate affect, cooperative  Neurologic: PERRL, symmetric facies, speech clear, oriented to self and , unsure of location  Skin: No rashes        Results Reviewed:  LAB RESULTS:      Lab 24  0442 24  0412 24  0404 24  1744 24  0709 01/15/24  0616 01/15/24  0325 01/15/24  0118   WBC 7.07 6.42 6.60  --  5.90 8.26  --  8.26   HEMOGLOBIN 8.2* 7.8* 7.4* 8.0* 6.4* 7.1*  --  7.6*   HEMATOCRIT 25.3* 24.0* 23.3* 24.7* 20.7* 22.3*  --  25.0*   PLATELETS 204 198 162  --  152 153  --  155   NEUTROS ABS 4.96 3.84 3.92  --  3.39  --   --  6.42   IMMATURE GRANS (ABS) 0.02 0.02 0.01  --  0.02  --   --  0.03   LYMPHS ABS 1.17 1.46 1.63  --  1.50  --   --  0.79   MONOS ABS 0.63 0.87 0.80  --  0.82  --   --  0.85   EOS ABS 0.27 0.20 0.22  --  0.15  --   --  0.15   MCV 89.4 91.3 91.0  --  94.5 94.9  --  98.0*   PROCALCITONIN  --   --   --   --   --   --  0.26*  --    LACTATE  --   --   --   --   --   --   --  1.6   D DIMER QUANT  --   --   --   --   --   --   --  0.83*         Lab 24  0412 24  0404  01/16/24  0709 01/15/24  1143 01/15/24  0614   SODIUM 144 138 143 144 140   POTASSIUM 4.1 4.0 4.1 4.8 5.1   CHLORIDE 106 102 107 109* 107   CO2 30.0* 26.0 26.0 25.0 25.0   ANION GAP 8.0 10.0 10.0 10.0 8.0   BUN 31* 30* 26* 33* 32*   CREATININE 1.64* 1.49* 1.52* 1.60* 1.56*   EGFR 45.8* 51.4* 50.2* 47.2* 48.7*   GLUCOSE 109* 145* 94 186* 245*   CALCIUM 8.6 8.5* 8.4* 8.7 8.4*   MAGNESIUM 2.0 1.9 2.0  --  2.4         Lab 01/15/24  0118   TOTAL PROTEIN 7.0   ALBUMIN 3.5   GLOBULIN 3.5   ALT (SGPT) 47*   AST (SGOT) 32   BILIRUBIN 0.2   ALK PHOS 110         Lab 01/15/24  0614 01/15/24  0325 01/15/24  0118   PROBNP  --   --  905.7*   HSTROP T 73* 97* 83*             Lab 01/16/24  0833 01/15/24  0614   IRON  --  17*   IRON SATURATION (TSAT)  --  6*   TIBC  --  289*   TRANSFERRIN  --  194*   FERRITIN  --  312.80   ABO TYPING O  --    RH TYPING Positive  --    ANTIBODY SCREEN Negative  --          Lab 01/15/24  0204   PH, ARTERIAL 7.366   PCO2, ARTERIAL 43.2   PO2 ART 87.8   FIO2 45   HCO3 ART 24.7   BASE EXCESS ART -0.7*   CARBOXYHEMOGLOBIN 1.9     Brief Urine Lab Results  (Last result in the past 365 days)        Color   Clarity   Blood   Leuk Est   Nitrite   Protein   CREAT   Urine HCG        08/02/23 1319 Yellow   Clear   Negative   Negative   Negative   30 mg/dL (1+)                   Microbiology Results Abnormal       Procedure Component Value - Date/Time    Blood Culture - Blood, Arm, Left [[id number]]  (Normal) Collected: 01/15/24 0118    Lab Status: Preliminary result Specimen: Blood from Arm, Left Updated: 01/19/24 0245     Blood Culture No growth at 4 days    Blood Culture - Blood, Arm, Right [[id number]]  (Normal) Collected: 01/15/24 0118    Lab Status: Preliminary result Specimen: Blood from Arm, Right Updated: 01/19/24 0245     Blood Culture No growth at 4 days    S. Pneumo Ag Urine or CSF - Urine, Urine, Clean Catch [[id number]]  (Normal) Collected: 01/15/24 0250    Lab Status: Final result Specimen: Urine, Clean  Catch Updated: 01/15/24 1102     Strep Pneumo Ag Negative    Legionella Antigen, Urine - Urine, Urine, Clean Catch [[id number]]  (Normal) Collected: 01/15/24 0250    Lab Status: Final result Specimen: Urine, Clean Catch Updated: 01/15/24 1102     LEGIONELLA ANTIGEN, URINE Negative    Respiratory Panel PCR w/COVID-19(SARS-CoV-2) GIANNI/SIENNA/ANNA/PAD/COR/ASHIA In-House, NP Swab in UTM/VTM, 2 HR TAT - Swab, Nasopharynx [[id number]]  (Normal) Collected: 01/15/24 0115    Lab Status: Final result Specimen: Swab from Nasopharynx Updated: 01/15/24 0225     ADENOVIRUS, PCR Not Detected     Coronavirus 229E Not Detected     Coronavirus HKU1 Not Detected     Coronavirus NL63 Not Detected     Coronavirus OC43 Not Detected     COVID19 Not Detected     Human Metapneumovirus Not Detected     Human Rhinovirus/Enterovirus Not Detected     Influenza A PCR Not Detected     Influenza B PCR Not Detected     Parainfluenza Virus 1 Not Detected     Parainfluenza Virus 2 Not Detected     Parainfluenza Virus 3 Not Detected     Parainfluenza Virus 4 Not Detected     RSV, PCR Not Detected     Bordetella pertussis pcr Not Detected     Bordetella parapertussis PCR Not Detected     Chlamydophila pneumoniae PCR Not Detected     Mycoplasma pneumo by PCR Not Detected    Narrative:      In the setting of a positive respiratory panel with a viral infection PLUS a negative procalcitonin without other underlying concern for bacterial infection, consider observing off antibiotics or discontinuation of antibiotics and continue supportive care. If the respiratory panel is positive for atypical bacterial infection (Bordetella pertussis, Chlamydophila pneumoniae, or Mycoplasma pneumoniae), consider antibiotic de-escalation to target atypical bacterial infection.            No radiology results from the last 24 hrs    Results for orders placed during the hospital encounter of 01/15/24    Adult Transthoracic Echo Complete With Contrast if Necessary Per  Protocol    Interpretation Summary    Left ventricular ejection fraction appears to be 56 - 60%.    Left ventricular wall thickness is consistent with hypertrophy.    Estimated right ventricular systolic pressure from tricuspid regurgitation is mildly elevated (35-45 mmHg).    There is a small (1-2cm) pericardial effusion.    No evidence for pericardial tamponade      Current medications:  Scheduled Meds:amLODIPine, 10 mg, Oral, Q24H  atorvastatin, 20 mg, Oral, Nightly  brimonidine, 1 drop, Both Eyes, BID   And  timolol, 1 drop, Both Eyes, BID  carvedilol, 25 mg, Oral, BID With Meals  cefepime, 2,000 mg, Intravenous, Q12H  cholecalciferol, 6,000 Units, Oral, Daily  divalproex, 375 mg, Oral, BID  famotidine, 20 mg, Oral, Daily  ferrous sulfate, 325 mg, Oral, Daily With Breakfast  [Held by provider] FLUoxetine, 20 mg, Oral, BID  [Held by provider] furosemide, 40 mg, Intravenous, BID  heparin (porcine), 5,000 Units, Subcutaneous, Q8H  [Held by provider] hydrALAZINE, 75 mg, Oral, Q8H  insulin lispro, 2-7 Units, Subcutaneous, 4x Daily AC & at Bedtime  linezolid, 600 mg, Oral, Q12H  melatonin, 5 mg, Oral, Nightly  senna-docusate sodium, 2 tablet, Oral, BID  sodium chloride, 10 mL, Intravenous, Q12H  thiamine, 100 mg, Oral, TID  [Held by provider] traZODone, 25 mg, Oral, Nightly      Continuous Infusions:     PRN Meds:.  acetaminophen    albuterol    senna-docusate sodium **AND** polyethylene glycol **AND** bisacodyl **AND** bisacodyl    Calcium Replacement - Follow Nurse / BPA Driven Protocol    dextrose    dextrose    glucagon (human recombinant)    hydrOXYzine    Magnesium Standard Dose Replacement - Follow Nurse / BPA Driven Protocol    Phosphorus Replacement - Follow Nurse / BPA Driven Protocol    Potassium Replacement - Follow Nurse / BPA Driven Protocol    sodium chloride    sodium chloride    sodium chloride    sodium chloride    Assessment & Plan   Assessment & Plan     Active Hospital Problems    Diagnosis  POA     **Hypoxia [R09.02]  Yes    Aspiration pneumonia [J69.0]  Yes    Dementia [F03.90]  Yes    S/P transmetatarsal amputation of foot, left [Z89.432]  Not Applicable    Anemia of chronic disease [D63.8]  Yes    Personal history of Methicillin resistant Staphylococcus aureus infection [Z86.14]  Yes    Neurocognitive disorder [R41.9]  Yes    Type 2 diabetes mellitus [E11.9]  Yes    Essential hypertension [I10]  Yes    Psychotic disorder [F29]  Yes    Type 2 diabetes mellitus with diabetic neuropathy, unspecified [E11.40]  Yes    Type 2 diabetes mellitus with diabetic chronic kidney disease [E11.22]  Yes      Resolved Hospital Problems   No resolved problems to display.        Brief Hospital Course to date:  Omkar Nava is a 68 y.o. male with history of chronic systolic heart failure, type 2 diabetes on insulin, anemia, hypertension, CKD, history of osteomyelitis status post left TMA, dementia presenting with acute hypoxemic respiratory failure.  He was admitted to hospitalist service with concern for multifocal pneumonia.    This patient's problems and plans were partially entered by my partner and updated as appropriate by me 01/19/24.    Acute on chronic hypoxemic respiratory failure secondary to,  Multifocal pneumonia with risk factors for healthcare associated infection  Acute on chronic exacerbation of systolic heart failure  -initially required HFNC, now on NC and seems relatively stable  -- Chest x-ray shows multifocal opacities greater on right than left concerning for multifocal pneumonia versus asymmetric pulmonary edema.    --placed on vanc (transitioned to linezolid on 1/18), cefepime, doxy for concern of healthcare acquired PNA w recent facility stay. Resp panel neg, legionella and strep neg. MRSA detected. Blood cultures in process  --Holding home torsemide; Cr had trended up, holding additional IV diuresis for now  -Echo shows EF of 56 to 60% and left ventricle hypertrophy.  -Continue home carvedilol.   He no longer appears to be taking SGLT2 or ACE inhibitor.  Will see where creatinine settles out, if improving can optimize guideline directed medical therapy     Acute kidney injury on CKD 3  -Baseline creatinine appears to be 1.3, he is 1.64 on admission and then trended back up --> improving off diuresis, vanc  -Holding diuresis and transitioned to linezolid     Elevated serum troponin level  -Troponin 83 on admission without ischemic changes on EKG or complaints of chest pain.  Trending down.  Suspect demand ischemia secondary to lung pathology and worsened by CKD     Anemia of chronic disease  -Baseline hemoglobin around 8, he is 7.6 on admission.  No signs of active bleeding.  iron is low, panel overall consistent w AOCD. Given new O2 requirement he was transfused on 1/16 1 unit of packed red blood cells. May benefit from IV iron before dc after acute infection issues     Hypertension  -added back amlodipine and coreg from home regimen, hold torsemide as he is receiving diuresis IV     Type 2 diabetes  -Uses 6 units of insulin glargine at skilled nursing facility.  continue SSI for now     History of dementia with behavioral disturbance  Insomnia  -Continue home medications  --complicates all aspects of care, high risk of hospital delirium    Expected Discharge Location and Transportation: back to [facility]  Expected Discharge   Expected Discharge Date: 1/20/2024; Expected Discharge Time:      DVT prophylaxis:  Medical DVT prophylaxis orders are present.     AM-PAC 6 Clicks Score (PT): 13 (01/18/24 2200)    CODE STATUS:   Code Status and Medical Interventions:   Ordered at: 01/15/24 4874     Code Status (Patient has no pulse and is not breathing):    CPR (Attempt to Resuscitate)     Medical Interventions (Patient has pulse or is breathing):    Full Support       Alexandra De Leon MD  01/19/24

## 2024-01-20 ENCOUNTER — APPOINTMENT (OUTPATIENT)
Dept: CT IMAGING | Facility: HOSPITAL | Age: 69
DRG: 177 | End: 2024-01-20
Payer: MEDICARE

## 2024-01-20 LAB
ANION GAP SERPL CALCULATED.3IONS-SCNC: 11 MMOL/L (ref 5–15)
BACTERIA SPEC AEROBE CULT: NORMAL
BACTERIA SPEC AEROBE CULT: NORMAL
BUN SERPL-MCNC: 25 MG/DL (ref 8–23)
BUN/CREAT SERPL: 18.8 (ref 7–25)
CALCIUM SPEC-SCNC: 9 MG/DL (ref 8.6–10.5)
CHLORIDE SERPL-SCNC: 100 MMOL/L (ref 98–107)
CO2 SERPL-SCNC: 30 MMOL/L (ref 22–29)
CREAT SERPL-MCNC: 1.33 MG/DL (ref 0.76–1.27)
EGFRCR SERPLBLD CKD-EPI 2021: 59 ML/MIN/1.73
GLUCOSE BLDC GLUCOMTR-MCNC: 113 MG/DL (ref 70–130)
GLUCOSE BLDC GLUCOMTR-MCNC: 179 MG/DL (ref 70–130)
GLUCOSE BLDC GLUCOMTR-MCNC: 190 MG/DL (ref 70–130)
GLUCOSE BLDC GLUCOMTR-MCNC: 59 MG/DL (ref 70–130)
GLUCOSE BLDC GLUCOMTR-MCNC: 73 MG/DL (ref 70–130)
GLUCOSE SERPL-MCNC: 167 MG/DL (ref 65–99)
POTASSIUM SERPL-SCNC: 4.3 MMOL/L (ref 3.5–5.2)
SODIUM SERPL-SCNC: 141 MMOL/L (ref 136–145)

## 2024-01-20 PROCEDURE — 80048 BASIC METABOLIC PNL TOTAL CA: CPT | Performed by: STUDENT IN AN ORGANIZED HEALTH CARE EDUCATION/TRAINING PROGRAM

## 2024-01-20 PROCEDURE — 25010000002 CEFEPIME PER 500 MG: Performed by: STUDENT IN AN ORGANIZED HEALTH CARE EDUCATION/TRAINING PROGRAM

## 2024-01-20 PROCEDURE — 63710000001 INSULIN LISPRO (HUMAN) PER 5 UNITS: Performed by: STUDENT IN AN ORGANIZED HEALTH CARE EDUCATION/TRAINING PROGRAM

## 2024-01-20 PROCEDURE — 70450 CT HEAD/BRAIN W/O DYE: CPT

## 2024-01-20 PROCEDURE — 82948 REAGENT STRIP/BLOOD GLUCOSE: CPT

## 2024-01-20 PROCEDURE — 25010000002 HEPARIN (PORCINE) PER 1000 UNITS: Performed by: STUDENT IN AN ORGANIZED HEALTH CARE EDUCATION/TRAINING PROGRAM

## 2024-01-20 PROCEDURE — 99232 SBSQ HOSP IP/OBS MODERATE 35: CPT | Performed by: STUDENT IN AN ORGANIZED HEALTH CARE EDUCATION/TRAINING PROGRAM

## 2024-01-20 RX ORDER — TORSEMIDE 20 MG/1
10 TABLET ORAL DAILY
Status: DISCONTINUED | OUTPATIENT
Start: 2024-01-20 | End: 2024-01-21 | Stop reason: HOSPADM

## 2024-01-20 RX ADMIN — HYDRALAZINE HYDROCHLORIDE 75 MG: 25 TABLET ORAL at 23:03

## 2024-01-20 RX ADMIN — DIVALPROEX SODIUM 375 MG: 125 TABLET, DELAYED RELEASE ORAL at 23:03

## 2024-01-20 RX ADMIN — INSULIN LISPRO 2 UNITS: 100 INJECTION, SOLUTION INTRAVENOUS; SUBCUTANEOUS at 14:47

## 2024-01-20 RX ADMIN — TORSEMIDE 10 MG: 20 TABLET ORAL at 09:02

## 2024-01-20 RX ADMIN — LINEZOLID 600 MG: 600 TABLET, FILM COATED ORAL at 09:02

## 2024-01-20 RX ADMIN — LINEZOLID 600 MG: 600 TABLET, FILM COATED ORAL at 23:04

## 2024-01-20 RX ADMIN — Medication 5 MG: at 23:04

## 2024-01-20 RX ADMIN — CARVEDILOL 25 MG: 12.5 TABLET, FILM COATED ORAL at 09:01

## 2024-01-20 RX ADMIN — FAMOTIDINE 20 MG: 20 TABLET, FILM COATED ORAL at 08:59

## 2024-01-20 RX ADMIN — Medication 6000 UNITS: at 09:00

## 2024-01-20 RX ADMIN — TIMOLOL MALEATE 1 DROP: 5 SOLUTION/ DROPS OPHTHALMIC at 23:02

## 2024-01-20 RX ADMIN — CEFEPIME 2000 MG: 2 INJECTION, POWDER, FOR SOLUTION INTRAVENOUS at 23:02

## 2024-01-20 RX ADMIN — BRIMONIDINE TARTRATE 1 DROP: 2 SOLUTION OPHTHALMIC at 23:02

## 2024-01-20 RX ADMIN — Medication 10 ML: at 23:02

## 2024-01-20 RX ADMIN — AMLODIPINE BESYLATE 10 MG: 10 TABLET ORAL at 06:16

## 2024-01-20 RX ADMIN — CEFEPIME 2000 MG: 2 INJECTION, POWDER, FOR SOLUTION INTRAVENOUS at 12:00

## 2024-01-20 RX ADMIN — CEFEPIME 2000 MG: 2 INJECTION, POWDER, FOR SOLUTION INTRAVENOUS at 00:11

## 2024-01-20 RX ADMIN — BRIMONIDINE TARTRATE 1 DROP: 2 SOLUTION OPHTHALMIC at 09:04

## 2024-01-20 RX ADMIN — HEPARIN SODIUM 5000 UNITS: 5000 INJECTION INTRAVENOUS; SUBCUTANEOUS at 23:03

## 2024-01-20 RX ADMIN — DIVALPROEX SODIUM 375 MG: 125 TABLET, DELAYED RELEASE ORAL at 09:04

## 2024-01-20 RX ADMIN — SENNOSIDES AND DOCUSATE SODIUM 2 TABLET: 8.6; 5 TABLET ORAL at 23:03

## 2024-01-20 RX ADMIN — THIAMINE HCL TAB 100 MG 100 MG: 100 TAB at 23:04

## 2024-01-20 RX ADMIN — HYDRALAZINE HYDROCHLORIDE 75 MG: 25 TABLET ORAL at 14:47

## 2024-01-20 RX ADMIN — ATORVASTATIN CALCIUM 20 MG: 20 TABLET, FILM COATED ORAL at 23:04

## 2024-01-20 RX ADMIN — SENNOSIDES AND DOCUSATE SODIUM 2 TABLET: 8.6; 5 TABLET ORAL at 09:02

## 2024-01-20 RX ADMIN — Medication 10 ML: at 09:03

## 2024-01-20 RX ADMIN — TIMOLOL MALEATE 1 DROP: 5 SOLUTION/ DROPS OPHTHALMIC at 09:03

## 2024-01-20 RX ADMIN — HEPARIN SODIUM 5000 UNITS: 5000 INJECTION INTRAVENOUS; SUBCUTANEOUS at 14:46

## 2024-01-20 RX ADMIN — THIAMINE HCL TAB 100 MG 100 MG: 100 TAB at 08:59

## 2024-01-20 RX ADMIN — FERROUS SULFATE TAB 325 MG (65 MG ELEMENTAL FE) 325 MG: 325 (65 FE) TAB at 08:59

## 2024-01-20 NOTE — CASE MANAGEMENT/SOCIAL WORK
Continued Stay Note  [facility]     Patient Name: Omkar Nava  MRN: [id number]  Today's Date: 1/20/2024    Admit Date: 1/15/2024    Plan: [place] [facility]   Discharge Plan       Row Name 01/20/24 0842       Plan    Plan [place]    Plan Comments Patient not medically ready today. CM cancel transport with [facility] at 1015. Patient maybe medically ready tomorrow. CM can setup transport tomorrow. CM will follow.                   Discharge Codes    No documentation.                 Expected Discharge Date and Time       Expected Discharge Date Expected Discharge Time    Jan 20, 2024               Edie Ochoa RN

## 2024-01-20 NOTE — PLAN OF CARE
Goal Outcome Evaluation:  Plan of Care Reviewed With: patient        Progress: no change  Outcome Evaluation: Pt remains confused. Uncooperative with mild aggression at start of shift. Able to calm down when pt talked to sister on the phone. Slept for a couple of hours and awake the rest of the night. BP elevated. Notified REJI Bustamante with order to give morning dose of Norvasc early.  Pt became uncooperative and aggressive again and refused to take medicine. Reoriented, redirected multiple times. Pt finally took med after we called his daughter. Resting quietly at this time. Cont current POC

## 2024-01-20 NOTE — PROGRESS NOTES
[facility] Medicine Services  PROGRESS NOTE    Patient Name: Omkar Nava  : 1955  MRN: [id number]    Date of Admission: 1/15/2024  Primary Care Physician: Deann Cao MD    Subjective   Subjective     CC:  F/u dyspnea    HPI:  Agitated and refusing care overnight.  Then had worsening hypertension this morning.    Objective   Objective     Vital Signs:   Temp:  [97.7 °F (36.5 °C)-98.2 °F (36.8 °C)] 98.2 °F (36.8 °C)  Heart Rate:  [60-70] 70  Resp:  [16-20] 16  BP: (119-188)/(58-96) 169/84  Flow (L/min):  [2] 2     Physical Exam:  Constitutional: No acute distress, awake, alert  HENT: NCAT, mucous membranes moist  Respiratory: Bibasilar crackles, respiratory effort normal   Cardiovascular: RRR, no murmurs, rubs, or gallops  Gastrointestinal: Normoactive bowel sounds, soft, nontender, nondistended  Musculoskeletal: No bilateral ankle edema  Psychiatric: Appropriate affect, cooperative  Neurologic: PERRL, symmetric facies, speech clear, oriented to self and , unsure of location  Skin: No rashes on exposed skin        Results Reviewed:  LAB RESULTS:      Lab 24  0442 24  0412 24  0404 24  1744 24  0709 01/15/24  0616 01/15/24  0325 01/15/24  0118   WBC 7.07 6.42 6.60  --  5.90 8.26  --  8.26   HEMOGLOBIN 8.2* 7.8* 7.4* 8.0* 6.4* 7.1*  --  7.6*   HEMATOCRIT 25.3* 24.0* 23.3* 24.7* 20.7* 22.3*  --  25.0*   PLATELETS 204 198 162  --  152 153  --  155   NEUTROS ABS 4.96 3.84 3.92  --  3.39  --   --  6.42   IMMATURE GRANS (ABS) 0.02 0.02 0.01  --  0.02  --   --  0.03   LYMPHS ABS 1.17 1.46 1.63  --  1.50  --   --  0.79   MONOS ABS 0.63 0.87 0.80  --  0.82  --   --  0.85   EOS ABS 0.27 0.20 0.22  --  0.15  --   --  0.15   MCV 89.4 91.3 91.0  --  94.5 94.9  --  98.0*   PROCALCITONIN  --   --   --   --   --   --  0.26*  --    LACTATE  --   --   --   --   --   --   --  1.6   D DIMER QUANT  --   --   --   --   --   --   --  0.83*         Lab  01/20/24  0712 01/19/24  0442 01/18/24  0412 01/17/24  0404 01/16/24  0709 01/15/24  1143 01/15/24  0614   SODIUM 141 139 144 138 143   < > 140   POTASSIUM 4.3 4.0 4.1 4.0 4.1   < > 5.1   CHLORIDE 100 101 106 102 107   < > 107   CO2 30.0* 28.0 30.0* 26.0 26.0   < > 25.0   ANION GAP 11.0 10.0 8.0 10.0 10.0   < > 8.0   BUN 25* 29* 31* 30* 26*   < > 32*   CREATININE 1.33* 1.44* 1.64* 1.49* 1.52*   < > 1.56*   EGFR 59.0* 53.6* 45.8* 51.4* 50.2*   < > 48.7*   GLUCOSE 167* 175* 109* 145* 94   < > 245*   CALCIUM 9.0 9.2 8.6 8.5* 8.4*   < > 8.4*   MAGNESIUM  --   --  2.0 1.9 2.0  --  2.4    < > = values in this interval not displayed.         Lab 01/15/24  0118   TOTAL PROTEIN 7.0   ALBUMIN 3.5   GLOBULIN 3.5   ALT (SGPT) 47*   AST (SGOT) 32   BILIRUBIN 0.2   ALK PHOS 110         Lab 01/15/24  0614 01/15/24  0325 01/15/24  0118   PROBNP  --   --  905.7*   HSTROP T 73* 97* 83*             Lab 01/16/24  0833 01/15/24  0614   IRON  --  17*   IRON SATURATION (TSAT)  --  6*   TIBC  --  289*   TRANSFERRIN  --  194*   FERRITIN  --  312.80   ABO TYPING O  --    RH TYPING Positive  --    ANTIBODY SCREEN Negative  --          Lab 01/15/24  0204   PH, ARTERIAL 7.366   PCO2, ARTERIAL 43.2   PO2 ART 87.8   FIO2 45   HCO3 ART 24.7   BASE EXCESS ART -0.7*   CARBOXYHEMOGLOBIN 1.9     Brief Urine Lab Results  (Last result in the past 365 days)        Color   Clarity   Blood   Leuk Est   Nitrite   Protein   CREAT   Urine HCG        08/02/23 1319 Yellow   Clear   Negative   Negative   Negative   30 mg/dL (1+)                   Microbiology Results Abnormal       Procedure Component Value - Date/Time    Blood Culture - Blood, Arm, Left [[id number]]  (Normal) Collected: 01/15/24 0118    Lab Status: Final result Specimen: Blood from Arm, Left Updated: 01/20/24 0245     Blood Culture No growth at 5 days    Blood Culture - Blood, Arm, Right [[id number]]  (Normal) Collected: 01/15/24 0118    Lab Status: Final result Specimen: Blood from Arm, Right  Updated: 01/20/24 0245     Blood Culture No growth at 5 days    S. Pneumo Ag Urine or CSF - Urine, Urine, Clean Catch [[id number]]  (Normal) Collected: 01/15/24 0250    Lab Status: Final result Specimen: Urine, Clean Catch Updated: 01/15/24 1102     Strep Pneumo Ag Negative    Legionella Antigen, Urine - Urine, Urine, Clean Catch [[id number]]  (Normal) Collected: 01/15/24 0250    Lab Status: Final result Specimen: Urine, Clean Catch Updated: 01/15/24 1102     LEGIONELLA ANTIGEN, URINE Negative    Respiratory Panel PCR w/COVID-19(SARS-CoV-2) GIANNI/SIENNA/ANNA/PAD/COR/ASHIA In-House, NP Swab in UTM/VTM, 2 HR TAT - Swab, Nasopharynx [[id number]]  (Normal) Collected: 01/15/24 0115    Lab Status: Final result Specimen: Swab from Nasopharynx Updated: 01/15/24 0225     ADENOVIRUS, PCR Not Detected     Coronavirus 229E Not Detected     Coronavirus HKU1 Not Detected     Coronavirus NL63 Not Detected     Coronavirus OC43 Not Detected     COVID19 Not Detected     Human Metapneumovirus Not Detected     Human Rhinovirus/Enterovirus Not Detected     Influenza A PCR Not Detected     Influenza B PCR Not Detected     Parainfluenza Virus 1 Not Detected     Parainfluenza Virus 2 Not Detected     Parainfluenza Virus 3 Not Detected     Parainfluenza Virus 4 Not Detected     RSV, PCR Not Detected     Bordetella pertussis pcr Not Detected     Bordetella parapertussis PCR Not Detected     Chlamydophila pneumoniae PCR Not Detected     Mycoplasma pneumo by PCR Not Detected    Narrative:      In the setting of a positive respiratory panel with a viral infection PLUS a negative procalcitonin without other underlying concern for bacterial infection, consider observing off antibiotics or discontinuation of antibiotics and continue supportive care. If the respiratory panel is positive for atypical bacterial infection (Bordetella pertussis, Chlamydophila pneumoniae, or Mycoplasma pneumoniae), consider antibiotic de-escalation to target atypical bacterial  infection.            No radiology results from the last 24 hrs    Results for orders placed during the hospital encounter of 01/15/24    Adult Transthoracic Echo Complete With Contrast if Necessary Per Protocol    Interpretation Summary    Left ventricular ejection fraction appears to be 56 - 60%.    Left ventricular wall thickness is consistent with hypertrophy.    Estimated right ventricular systolic pressure from tricuspid regurgitation is mildly elevated (35-45 mmHg).    There is a small (1-2cm) pericardial effusion.    No evidence for pericardial tamponade      Current medications:  Scheduled Meds:amLODIPine, 10 mg, Oral, Q24H  atorvastatin, 20 mg, Oral, Nightly  brimonidine, 1 drop, Both Eyes, BID   And  timolol, 1 drop, Both Eyes, BID  carvedilol, 25 mg, Oral, BID With Meals  cefepime, 2,000 mg, Intravenous, Q12H  cholecalciferol, 6,000 Units, Oral, Daily  divalproex, 375 mg, Oral, BID  famotidine, 20 mg, Oral, Daily  ferrous sulfate, 325 mg, Oral, Daily With Breakfast  [Held by provider] FLUoxetine, 20 mg, Oral, BID  heparin (porcine), 5,000 Units, Subcutaneous, Q8H  hydrALAZINE, 75 mg, Oral, Q8H  insulin lispro, 2-7 Units, Subcutaneous, 4x Daily AC & at Bedtime  linezolid, 600 mg, Oral, Q12H  melatonin, 5 mg, Oral, Nightly  senna-docusate sodium, 2 tablet, Oral, BID  sodium chloride, 10 mL, Intravenous, Q12H  thiamine, 100 mg, Oral, TID  torsemide, 10 mg, Oral, Daily  [Held by provider] traZODone, 25 mg, Oral, Nightly      Continuous Infusions:     PRN Meds:.  acetaminophen    albuterol    senna-docusate sodium **AND** polyethylene glycol **AND** bisacodyl **AND** bisacodyl    Calcium Replacement - Follow Nurse / BPA Driven Protocol    dextrose    dextrose    glucagon (human recombinant)    hydrOXYzine    Magnesium Standard Dose Replacement - Follow Nurse / BPA Driven Protocol    Phosphorus Replacement - Follow Nurse / BPA Driven Protocol    Potassium Replacement - Follow Nurse / BPA Driven Protocol     sodium chloride    sodium chloride    sodium chloride    sodium chloride    Assessment & Plan   Assessment & Plan     Active Hospital Problems    Diagnosis  POA    **Hypoxia [R09.02]  Yes    Aspiration pneumonia [J69.0]  Yes    Dementia [F03.90]  Yes    S/P transmetatarsal amputation of foot, left [Z89.432]  Not Applicable    Anemia of chronic disease [D63.8]  Yes    Personal history of Methicillin resistant Staphylococcus aureus infection [Z86.14]  Yes    Neurocognitive disorder [R41.9]  Yes    Type 2 diabetes mellitus [E11.9]  Yes    Essential hypertension [I10]  Yes    Psychotic disorder [F29]  Yes    Type 2 diabetes mellitus with diabetic neuropathy, unspecified [E11.40]  Yes    Type 2 diabetes mellitus with diabetic chronic kidney disease [E11.22]  Yes      Resolved Hospital Problems   No resolved problems to display.        Brief Hospital Course to date:  Omkar Nava is a 68 y.o. male with history of chronic systolic heart failure, type 2 diabetes on insulin, anemia, hypertension, CKD, history of osteomyelitis status post left TMA, dementia presenting with acute hypoxemic respiratory failure.  He was admitted to hospitalist service with concern for multifocal pneumonia.    This patient's problems and plans were partially entered by my partner and updated as appropriate by me 01/20/24.    Acute on chronic hypoxemic respiratory failure secondary to,  Multifocal pneumonia with risk factors for healthcare associated infection  Acute on chronic exacerbation of systolic heart failure  -initially required HFNC, now on NC and seems relatively stable  -- Chest x-ray shows multifocal opacities greater on right than left concerning for multifocal pneumonia versus asymmetric pulmonary edema.    --placed on vanc (transitioned to linezolid on 1/18), cefepime for concern of healthcare acquired PNA w recent facility stay --> to complete total of 7 days of therapy (will switch to all PO to complete at time of discharge).  Resp panel neg, legionella and strep neg. MRSA detected. Blood cultures no growth  -Echo shows EF of 56 to 60% and left ventricle hypertrophy.  -Continue home carvedilol, resumed torsemide.  He no longer appears to be taking SGLT2 or ACE inhibitor.  Will see where creatinine settles out, if improving can optimize guideline directed medical therapy     Acute kidney injury on CKD 3  -Baseline creatinine appears to be 1.3, he is 1.64 on admission and then trended back up --> improved  -resumed torsemide      Elevated serum troponin level  -Troponin 83 on admission without ischemic changes on EKG or complaints of chest pain.  Trending down.  Suspect demand ischemia secondary to lung pathology and worsened by CKD     Anemia of chronic disease  -Baseline hemoglobin around 8, he is 7.6 on admission.  No signs of active bleeding.  iron is low, panel overall consistent w AOCD. Given new O2 requirement he was transfused on  1 unit of packed red blood cells. May benefit from IV iron before dc after acute infection issues     Hypertension  -added back amlodipine and coreg from home regimen, resumed hydralazine, torsemide     Type 2 diabetes  -Uses 6 units of insulin glargine at skilled nursing facility. continue SSI for now     History of dementia with behavioral disturbance  Insomnia  -Continue home medications  --complicates all aspects of care, high risk of hospital delirium    Called the patient's daughter on  and verified patient name/; updated on plan of care    Expected Discharge Location and Transportation: back to [facility]  Expected Discharge   Expected Discharge Date: 2024; Expected Discharge Time:      DVT prophylaxis:  Medical DVT prophylaxis orders are present.     AM-PAC 6 Clicks Score (PT): 13 (24)    CODE STATUS:   Code Status and Medical Interventions:   Ordered at: 01/15/24 0330     Code Status (Patient has no pulse and is not breathing):    CPR (Attempt to Resuscitate)      Medical Interventions (Patient has pulse or is breathing):    Full Support       Alexandra De Leon MD  01/20/24

## 2024-01-21 VITALS
WEIGHT: 187.7 LBS | HEIGHT: 60 IN | HEART RATE: 73 BPM | DIASTOLIC BLOOD PRESSURE: 74 MMHG | RESPIRATION RATE: 18 BRPM | SYSTOLIC BLOOD PRESSURE: 161 MMHG | TEMPERATURE: 98.3 F | BODY MASS INDEX: 36.85 KG/M2 | OXYGEN SATURATION: 96 %

## 2024-01-21 LAB
ANION GAP SERPL CALCULATED.3IONS-SCNC: 12 MMOL/L (ref 5–15)
BUN SERPL-MCNC: 24 MG/DL (ref 8–23)
BUN/CREAT SERPL: 17 (ref 7–25)
CALCIUM SPEC-SCNC: 9.3 MG/DL (ref 8.6–10.5)
CHLORIDE SERPL-SCNC: 98 MMOL/L (ref 98–107)
CO2 SERPL-SCNC: 25 MMOL/L (ref 22–29)
CREAT SERPL-MCNC: 1.41 MG/DL (ref 0.76–1.27)
EGFRCR SERPLBLD CKD-EPI 2021: 55 ML/MIN/1.73
GLUCOSE BLDC GLUCOMTR-MCNC: 140 MG/DL (ref 70–130)
GLUCOSE BLDC GLUCOMTR-MCNC: 222 MG/DL (ref 70–130)
GLUCOSE SERPL-MCNC: 125 MG/DL (ref 65–99)
POTASSIUM SERPL-SCNC: 4 MMOL/L (ref 3.5–5.2)
SODIUM SERPL-SCNC: 135 MMOL/L (ref 136–145)

## 2024-01-21 PROCEDURE — 99239 HOSP IP/OBS DSCHRG MGMT >30: CPT | Performed by: STUDENT IN AN ORGANIZED HEALTH CARE EDUCATION/TRAINING PROGRAM

## 2024-01-21 PROCEDURE — 80048 BASIC METABOLIC PNL TOTAL CA: CPT | Performed by: STUDENT IN AN ORGANIZED HEALTH CARE EDUCATION/TRAINING PROGRAM

## 2024-01-21 PROCEDURE — 25010000002 HEPARIN (PORCINE) PER 1000 UNITS: Performed by: STUDENT IN AN ORGANIZED HEALTH CARE EDUCATION/TRAINING PROGRAM

## 2024-01-21 PROCEDURE — 82948 REAGENT STRIP/BLOOD GLUCOSE: CPT

## 2024-01-21 RX ORDER — ALBUTEROL SULFATE 90 UG/1
2 AEROSOL, METERED RESPIRATORY (INHALATION) EVERY 4 HOURS PRN
Status: ON HOLD
Start: 2024-01-21

## 2024-01-21 RX ORDER — LINEZOLID 600 MG/1
600 TABLET, FILM COATED ORAL EVERY 12 HOURS SCHEDULED
Qty: 8 TABLET | Refills: 0 | Status: SHIPPED | OUTPATIENT
Start: 2024-01-21 | End: 2024-01-25

## 2024-01-21 RX ORDER — TRAZODONE HYDROCHLORIDE 50 MG/1
25 TABLET ORAL NIGHTLY
Qty: 30 TABLET | Refills: 0 | Status: ON HOLD | OUTPATIENT
Start: 2024-01-25

## 2024-01-21 RX ORDER — AMLODIPINE BESYLATE 10 MG/1
10 TABLET ORAL
Status: ON HOLD
Start: 2024-01-21

## 2024-01-21 RX ORDER — FLUOXETINE HYDROCHLORIDE 20 MG/1
20 CAPSULE ORAL 2 TIMES DAILY
Qty: 30 CAPSULE | Refills: 0 | Status: ON HOLD | OUTPATIENT
Start: 2024-01-25

## 2024-01-21 RX ORDER — LEVOFLOXACIN 750 MG/1
750 TABLET, FILM COATED ORAL DAILY
Qty: 1 TABLET | Refills: 0 | Status: SHIPPED | OUTPATIENT
Start: 2024-01-22 | End: 2024-01-23

## 2024-01-21 RX ORDER — HYDRALAZINE HYDROCHLORIDE 25 MG/1
50 TABLET, FILM COATED ORAL 3 TIMES DAILY
Status: ON HOLD
Start: 2024-01-21

## 2024-01-21 RX ORDER — INSULIN LISPRO 100 [IU]/ML
2-7 INJECTION, SOLUTION INTRAVENOUS; SUBCUTANEOUS
Status: ON HOLD
Start: 2024-01-21

## 2024-01-21 RX ADMIN — Medication 10 ML: at 10:52

## 2024-01-21 RX ADMIN — HEPARIN SODIUM 5000 UNITS: 5000 INJECTION INTRAVENOUS; SUBCUTANEOUS at 06:34

## 2024-01-21 RX ADMIN — HYDRALAZINE HYDROCHLORIDE 75 MG: 25 TABLET ORAL at 06:34

## 2024-01-21 NOTE — DISCHARGE SUMMARY
[facility]  DISCHARGE SUMMARY    Patient Name: Omkar Nava  : 1955  MRN: [id number]    Date of Admission: 1/15/2024  1:04 AM  Date of Discharge: 2024  Primary Care Physician: Deann Cao MD    Consults       No orders found from 2023 to 2024.            Hospital Course     Presenting Problem: hypoxia    Active Hospital Problems    Diagnosis  POA    **Hypoxia [R09.02]  Yes    Aspiration pneumonia [J69.0]  Yes    Dementia [F03.90]  Yes    S/P transmetatarsal amputation of foot, left [Z89.432]  Not Applicable    Anemia of chronic disease [D63.8]  Yes    Personal history of Methicillin resistant Staphylococcus aureus infection [Z86.14]  Yes    Neurocognitive disorder [R41.9]  Yes    Type 2 diabetes mellitus [E11.9]  Yes    Essential hypertension [I10]  Yes    Psychotic disorder [F29]  Yes    Type 2 diabetes mellitus with diabetic neuropathy, unspecified [E11.40]  Yes    Type 2 diabetes mellitus with diabetic chronic kidney disease [E11.22]  Yes      Resolved Hospital Problems   No resolved problems to display.          Hospital Course:  Omkar Nava is a 68 y.o. male with history of chronic systolic heart failure, type 2 diabetes on insulin, anemia, hypertension, CKD, history of osteomyelitis status post left TMA, dementia presenting with acute hypoxemic respiratory failure.  He was admitted to hospitalist service with concern for multifocal pneumonia.     This patient's problems and plans were partially entered by my partner and updated as appropriate by me 24.     Acute on chronic hypoxemic respiratory failure secondary to,  Multifocal pneumonia with risk factors for healthcare associated infection  Acute on chronic exacerbation of systolic heart failure  --initially required HFNC, now on NC and seems relatively stable  -- Chest x-ray showed multifocal opacities greater on right than left concerning for multifocal pneumonia  versus asymmetric pulmonary edema.    --placed on vanc (transitioned to linezolid on 1/18), cefepime for concern of healthcare acquired PNA w recent facility stay --> to complete total of 7 days of therapy (will switch to all PO to complete at time of discharge). Resp panel neg, legionella and strep neg. MRSA detected. Blood cultures no growth  -Echo shows EF of 56 to 60% and left ventricle hypertrophy.  -Continue home carvedilol, resumed torsemide.    -He no longer appears to be taking SGLT2 or ACE inhibitor. Follow-up with heart failure clinic to work on GDMT     Acute kidney injury on CKD 3  -Baseline creatinine appears to be ~1.3, he is 1.64 on admission and then trended back up --> improved  -resumed torsemide      Elevated serum troponin level  -Troponin 83 on admission without ischemic changes on EKG or complaints of chest pain.  Trending down.  Suspect demand ischemia secondary to lung pathology and worsened by CKD     Anemia of chronic disease  -Baseline hemoglobin around 8, he was 7.6 on admission.  No signs of active bleeding.  iron is low, panel overall consistent w AOCD. Given new O2 requirement he was transfused on 1/16 1 unit of packed red blood cells.      Hypertension  -added back amlodipine and coreg from home regimen, resumed hydralazine, torsemide     Type 2 diabetes  -Uses 6 units of insulin glargine at skilled nursing facility. continue SSI for now while hospitalized and on DC given patient didn't require significant amounts of insulin.      History of dementia with behavioral disturbance  Insomnia  -Continue home medications  --complicates all aspects of care, high risk of hospital delirium    Fall  --Unwitnessed overnight; CT head negative; moves all extremities normally and without pain  --Patient's daughter (Idalia) updated on the fall    Called the patient's daughter and updated on plan of care and the patient's fall from overnight    Discharge Follow Up Recommendations for outpatient  labs/diagnostics:  --Follow-up with primary care doctor in 5 to 7 days regarding this hospitalization, chronic disease management, repeat labs (CBC, CMP)  --Repeat Chest X-ray in 1 month  --Follow-up with heart failure clinic in 1 week to work on GDMT    Antibiotics:  Complete Linezolid on evening of 1/24/2024  Complete Levaquin on 1/22/2024  Resume Trazodone, Fluoxetine on 1/25/2024    Day of Discharge     HPI:   Patient denied pain.  He asked for help with eating his eggs this morning, as he states he always gets them all over himself.  Patient had a fall overnight and CT head was obtained that showed no bleeding or trauma. The patient's daughter was updated the fall. NC was not in his nostrils and he was satting 95%.     Review of Systems  Gen- No fevers, chills  CV- No chest pain, palpitations  Resp- No cough, dyspnea  GI- No N/V/D, abd pain    Vital Signs:   Temp:  [97.1 °F (36.2 °C)-98.3 °F (36.8 °C)] 98.3 °F (36.8 °C)  Heart Rate:  [61-77] 73  Resp:  [16-18] 18  BP: (152-177)/(64-84) 161/74  Flow (L/min):  [2] 2      Physical Exam:  Constitutional: No acute distress, awake, alert  HENT: NCAT, mucous membranes moist  Respiratory: Bibasilar crackles, respiratory effort normal   Cardiovascular: RRR, no murmurs, rubs, or gallops  Gastrointestinal: Normoactive bowel sounds, soft, nontender, nondistended  Musculoskeletal: No bilateral ankle edema  Psychiatric: Appropriate affect, cooperative  Neurologic: PERRL, symmetric facies, speech clear, oriented to self and 2024, unsure of location  Skin: No rashes on exposed skin    Pertinent  and/or Most Recent Results     LAB RESULTS:      Lab 01/19/24  0442 01/18/24  0412 01/17/24  0404 01/16/24  1744 01/16/24  0709 01/15/24  0616 01/15/24  0325 01/15/24  0118   WBC 7.07 6.42 6.60  --  5.90 8.26  --  8.26   HEMOGLOBIN 8.2* 7.8* 7.4* 8.0* 6.4* 7.1*  --  7.6*   HEMATOCRIT 25.3* 24.0* 23.3* 24.7* 20.7* 22.3*  --  25.0*   PLATELETS 204 198 162  --  152 153  --  155   NEUTROS  ABS 4.96 3.84 3.92  --  3.39  --   --  6.42   IMMATURE GRANS (ABS) 0.02 0.02 0.01  --  0.02  --   --  0.03   LYMPHS ABS 1.17 1.46 1.63  --  1.50  --   --  0.79   MONOS ABS 0.63 0.87 0.80  --  0.82  --   --  0.85   EOS ABS 0.27 0.20 0.22  --  0.15  --   --  0.15   MCV 89.4 91.3 91.0  --  94.5 94.9  --  98.0*   PROCALCITONIN  --   --   --   --   --   --  0.26*  --    LACTATE  --   --   --   --   --   --   --  1.6   D DIMER QUANT  --   --   --   --   --   --   --  0.83*         Lab 01/21/24  0418 01/20/24  0712 01/19/24  0442 01/18/24  0412 01/17/24  0404 01/16/24  0709 01/15/24  1143 01/15/24  0614   SODIUM 135* 141 139 144 138 143   < > 140   POTASSIUM 4.0 4.3 4.0 4.1 4.0 4.1   < > 5.1   CHLORIDE 98 100 101 106 102 107   < > 107   CO2 25.0 30.0* 28.0 30.0* 26.0 26.0   < > 25.0   ANION GAP 12.0 11.0 10.0 8.0 10.0 10.0   < > 8.0   BUN 24* 25* 29* 31* 30* 26*   < > 32*   CREATININE 1.41* 1.33* 1.44* 1.64* 1.49* 1.52*   < > 1.56*   EGFR 55.0* 59.0* 53.6* 45.8* 51.4* 50.2*   < > 48.7*   GLUCOSE 125* 167* 175* 109* 145* 94   < > 245*   CALCIUM 9.3 9.0 9.2 8.6 8.5* 8.4*   < > 8.4*   MAGNESIUM  --   --   --  2.0 1.9 2.0  --  2.4    < > = values in this interval not displayed.         Lab 01/15/24  0118   TOTAL PROTEIN 7.0   ALBUMIN 3.5   GLOBULIN 3.5   ALT (SGPT) 47*   AST (SGOT) 32   BILIRUBIN 0.2   ALK PHOS 110         Lab 01/15/24  0614 01/15/24  0325 01/15/24  0118   PROBNP  --   --  905.7*   HSTROP T 73* 97* 83*             Lab 01/16/24  0833 01/15/24  0614   IRON  --  17*   IRON SATURATION (TSAT)  --  6*   TIBC  --  289*   TRANSFERRIN  --  194*   FERRITIN  --  312.80   ABO TYPING O  --    RH TYPING Positive  --    ANTIBODY SCREEN Negative  --          Lab 01/15/24  0204   PH, ARTERIAL 7.366   PCO2, ARTERIAL 43.2   PO2 ART 87.8   FIO2 45   HCO3 ART 24.7   BASE EXCESS ART -0.7*   CARBOXYHEMOGLOBIN 1.9     Brief Urine Lab Results  (Last result in the past 365 days)        Color   Clarity   Blood   Leuk Est   Nitrite    Protein   CREAT   Urine HCG        08/02/23 1319 Yellow   Clear   Negative   Negative   Negative   30 mg/dL (1+)                 Microbiology Results (last 10 days)       Procedure Component Value - Date/Time    MRSA Screen, PCR (Inpatient) - Swab, Nares [[id number]]  (Abnormal) Collected: 01/15/24 0532    Lab Status: Final result Specimen: Swab from Nares Updated: 01/15/24 1226     MRSA PCR MRSA Detected    Narrative:      The negative predictive value of this diagnostic test is high and should only be used to consider de-escalating anti-MRSA therapy. A positive result may indicate colonization with MRSA and must be correlated clinically.    Legionella Antigen, Urine - Urine, Urine, Clean Catch [[id number]]  (Normal) Collected: 01/15/24 0250    Lab Status: Final result Specimen: Urine, Clean Catch Updated: 01/15/24 1102     LEGIONELLA ANTIGEN, URINE Negative    S. Pneumo Ag Urine or CSF - Urine, Urine, Clean Catch [[id number]]  (Normal) Collected: 01/15/24 0250    Lab Status: Final result Specimen: Urine, Clean Catch Updated: 01/15/24 1102     Strep Pneumo Ag Negative    Blood Culture - Blood, Arm, Left [[id number]]  (Normal) Collected: 01/15/24 0118    Lab Status: Final result Specimen: Blood from Arm, Left Updated: 01/20/24 0245     Blood Culture No growth at 5 days    Blood Culture - Blood, Arm, Right [[id number]]  (Normal) Collected: 01/15/24 0118    Lab Status: Final result Specimen: Blood from Arm, Right Updated: 01/20/24 0245     Blood Culture No growth at 5 days    Respiratory Panel PCR w/COVID-19(SARS-CoV-2) [facility] In-House, NP Swab in UTM/VTM, 2 HR TAT - Swab, Nasopharynx [[id number]]  (Normal) Collected: 01/15/24 0115    Lab Status: Final result Specimen: Swab from Nasopharynx Updated: 01/15/24 0225     ADENOVIRUS, PCR Not Detected     Coronavirus 229E Not Detected     Coronavirus HKU1 Not Detected     Coronavirus NL63 Not Detected     Coronavirus OC43 Not Detected     COVID19 Not Detected      Human Metapneumovirus Not Detected     Human Rhinovirus/Enterovirus Not Detected     Influenza A PCR Not Detected     Influenza B PCR Not Detected     Parainfluenza Virus 1 Not Detected     Parainfluenza Virus 2 Not Detected     Parainfluenza Virus 3 Not Detected     Parainfluenza Virus 4 Not Detected     RSV, PCR Not Detected     Bordetella pertussis pcr Not Detected     Bordetella parapertussis PCR Not Detected     Chlamydophila pneumoniae PCR Not Detected     Mycoplasma pneumo by PCR Not Detected    Narrative:      In the setting of a positive respiratory panel with a viral infection PLUS a negative procalcitonin without other underlying concern for bacterial infection, consider observing off antibiotics or discontinuation of antibiotics and continue supportive care. If the respiratory panel is positive for atypical bacterial infection (Bordetella pertussis, Chlamydophila pneumoniae, or Mycoplasma pneumoniae), consider antibiotic de-escalation to target atypical bacterial infection.            CT Head Without Contrast    Result Date: 1/20/2024  CT HEAD WO CONTRAST Date of Exam: 1/20/2024 9:35 PM EST Indication: unwitnessed fall. Comparison: 7/31/2023. Technique: Axial CT images were obtained of the head without contrast administration.  Automated exposure control and iterative construction methods were used. Findings: There is postop change to the right orbital globe with hyperdense material filling the posterior vitreous. There is mild patchy periventricular white matter hypoattenuation. The no new hypoattenuating lesions in the brain. No acute intracranial hemorrhage. Cerebellum, brainstem and basal ganglia appear within normal limits. There is mild age-appropriate generalized parenchymal volume loss. There is thinning of the orbital lenses bilaterally. The mastoid air cells and paranasal sinuses appear well aerated. The calvarium is intact. Superficial soft tissues are unremarkable.     Impression: 1.No acute  intracranial abnormality. 2.Mild chronic small vessel ischemic change and mild generalized parenchymal volume loss. 3.Postoperative changes to the right orbital globe. Electronically Signed: Rayray Mcclain MD  1/20/2024 9:45 PM EST  Workstation ID: [id number]    Adult Transthoracic Echo Complete With Contrast if Necessary Per Protocol    Result Date: 1/17/2024    Left ventricular ejection fraction appears to be 56 - 60%.   Left ventricular wall thickness is consistent with hypertrophy.   Estimated right ventricular systolic pressure from tricuspid regurgitation is mildly elevated (35-45 mmHg).   There is a small (1-2cm) pericardial effusion.   No evidence for pericardial tamponade     XR Abdomen KUB    Result Date: 1/16/2024  XR ABDOMEN KUB Date of Exam: 1/16/2024 10:33 AM EST Indication: guarding on exam, concern for ileus/obstruction Comparison: CT abdomen pelvis 7/31/2023. Findings: Nonobstructive bowel gas pattern. Large volume of formed stool throughout the colon.     Impression: No evidence of bowel obstruction. Large stool burden, suggestive of constipation. Electronically Signed: Anjum Prado MD  1/16/2024 11:15 AM EST  Workstation ID: [id number]    XR Chest 1 View    Result Date: 1/15/2024  XR CHEST 1 VW Date of Exam: 1/15/2024 2:05 AM EST Indication: SOA triage protocol Comparison: Chest radiograph July 31, 2023 Findings: The heart size normal. There are diffuse bilateral alveolar and interstitial airspace opacities greater on the right than the left. Findings are most likely secondary to multifocal pneumonia. Differential would include asymmetric pulmonary edema.     Impression: Multifocal alveolar and interstitial opacities favors pneumonia. Electronically Signed: Boby Riggs MD  1/15/2024 2:36 AM EST  Workstation ID: [id number]             Results for orders placed during the hospital encounter of 01/15/24    Adult Transthoracic Echo Complete With Contrast if Necessary Per Protocol    Interpretation  Summary    Left ventricular ejection fraction appears to be 56 - 60%.    Left ventricular wall thickness is consistent with hypertrophy.    Estimated right ventricular systolic pressure from tricuspid regurgitation is mildly elevated (35-45 mmHg).    There is a small (1-2cm) pericardial effusion.    No evidence for pericardial tamponade      Plan for Follow-up of Pending Labs/Results:    Discharge Details        Discharge Medications        New Medications        Instructions Start Date   Insulin Lispro 100 UNIT/ML injection  Commonly known as: humaLOG   2-7 Units, Subcutaneous, 4 Times Daily Before Meals & Nightly      levoFLOXacin 750 MG tablet  Commonly known as: Levaquin   750 mg, Oral, Daily   Start Date: January 22, 2024     linezolid 600 MG tablet  Commonly known as: ZYVOX   600 mg, Oral, Every 12 Hours Scheduled      traZODone 50 MG tablet  Commonly known as: DESYREL  Replaces: traZODone 25 MG half tablet   25 mg, Oral, Nightly, Resume on 1/25/2024   Start Date: January 25, 2024            Changes to Medications        Instructions Start Date   amLODIPine 10 MG tablet  Commonly known as: NORVASC  What changed:   medication strength  how much to take   10 mg, Oral, Every 24 Hours Scheduled      FLUoxetine 20 MG capsule  Commonly known as: PROzac  What changed:   additional instructions  These instructions start on January 25, 2024. If you are unsure what to do until then, ask your doctor or other care provider.   20 mg, Oral, 2 Times Daily, Resume on 1/25/2024   Start Date: January 25, 2024            Continue These Medications        Instructions Start Date   acetaminophen 325 MG tablet  Commonly known as: TYLENOL   650 mg, Oral, Every 6 Hours PRN      albuterol sulfate  (90 Base) MCG/ACT inhaler  Commonly known as: PROVENTIL HFA;VENTOLIN HFA;PROAIR HFA   2 puffs, Inhalation, Every 4 Hours PRN      atorvastatin 20 MG tablet  Commonly known as: LIPITOR   20 mg, Oral, Nightly      brimonidine-timolol  0.2-0.5 % ophthalmic solution  Commonly known as: COMBIGAN   1 drop, Both Eyes, 2 Times Daily      carvedilol 25 MG tablet  Commonly known as: COREG   25 mg, Oral, 2 Times Daily With Meals      Cholecalciferol 50 MCG (2000 UT) tablet   2 capsules, Oral, Every Morning      cloNIDine 0.1 MG tablet  Commonly known as: CATAPRES   0.1 mg, Oral, 4 Times Daily PRN, AS NEEDED FOR SBP GREATER THAN 160      divalproex 125 MG DR tablet  Commonly known as: DEPAKOTE   375 mg, Oral, 2 Times Daily      docusate sodium 100 MG capsule  Commonly known as: COLACE   100 mg, Oral, 2 Times Daily PRN      famotidine 20 MG tablet  Commonly known as: PEPCID   20 mg, Oral, 2 Times Daily      ferrous sulfate 325 (65 FE) MG tablet   325 mg, Oral, Daily With Breakfast      hydrALAZINE 25 MG tablet  Commonly known as: APRESOLINE   50 mg, Oral, 3 Times Daily      hydrOXYzine 50 MG tablet  Commonly known as: ATARAX   50 mg, Oral, Every 6 Hours PRN      melatonin 5 MG tablet tablet   5 mg, Oral, Nightly      thiamine 100 MG tablet  tablet  Commonly known as: VITAMIN B-1   100 mg, Oral, 3 Times Daily      torsemide 10 MG tablet  Commonly known as: DEMADEX   10 mg, Oral, Daily             Stop These Medications      insulin glargine 100 UNIT/ML injection  Commonly known as: LANTUS, SEMGLEE     ketorolac 0.5 % ophthalmic solution  Commonly known as: ACULAR     traZODone 25 MG half tablet  Commonly known as: DESYREL  Replaced by: traZODone 50 MG tablet              No Known Allergies      Discharge Disposition:  Skilled Nursing Facility (DC - External)    Diet:  Hospital:  Diet Order   Procedures    Diet: Cardiac Diets, Diabetic Diets; Healthy Heart (2-3 Na+); Consistent Carbohydrate; Texture: Regular Texture (IDDSI 7); Fluid Consistency: Thin (IDDSI 0)       Diet Instructions       Diet: Cardiac Diets, Diabetic Diets; Healthy Heart (2-3 Na+); Regular Texture (IDDSI 7); Thin (IDDSI 0); Consistent Carbohydrate      Discharge Diet:  Cardiac  Diets  Diabetic Diets       Cardiac Diet: Healthy Heart (2-3 Na+)    Texture: Regular Texture (IDDSI 7)    Fluid Consistency: Thin (IDDSI 0)    Diabetic Diet: Consistent Carbohydrate             Activity:      Restrictions or Other Recommendations:       CODE STATUS:    Code Status and Medical Interventions:   Ordered at: 01/15/24 0333     Code Status (Patient has no pulse and is not breathing):    CPR (Attempt to Resuscitate)     Medical Interventions (Patient has pulse or is breathing):    Full Support       Future Appointments   Date Time Provider Department Center   1/21/2024  2:30 PM MED 11  [facility]       Additional Instructions for the Follow-ups that You Need to Schedule       Call MD With Problems / Concerns   As directed      Please seek medical attention for any the following: Difficulty breathing, chest pain, seizure-like activity, and/or any other concerning symptoms    Order Comments: Please seek medical attention for any the following: Difficulty breathing, chest pain, seizure-like activity, and/or any other concerning symptoms         Discharge Follow-up with PCP   As directed       Currently Documented PCP:    Deann Cao MD    PCP Phone Number:    [phone]     Follow Up Details: Follow-up with primary care doctor in 5 to 7 days regarding this hospitalization, chronic disease management, repeat labs (CBC, CMP)                      Alexandra De Leon MD  01/21/24      Time Spent on Discharge:  I spent  50  minutes on this discharge activity which included: face-to-face encounter with the patient, reviewing the data in the system, coordination of the care with the nursing staff as well as consultants, documentation, and entering orders.

## 2024-01-21 NOTE — DISCHARGE PLACEMENT REQUEST
"Prema Sung (68 y.o. Male)       Date of Birth   1955    Social Security Number       Address   [address] [place] [address]    Home Phone   [phone]    MRN   [id number]       Shinto   None    Marital Status   Single                            Admission Date   1/15/24    Admission Type   Emergency    Admitting Provider   Alexandra De Leon MD    Attending Provider   Alexandra De Leon MD    Department, Room/Bed   [facility] 6A, N619/1       Discharge Date       Discharge Disposition   Skilled Nursing Facility ([place] - External)    Discharge Destination                                 Attending Provider: Alexandra De Leon MD    Allergies: No Known Allergies    Isolation: None   Infection: MRSA (23)   Code Status: CPR    Ht: 152 cm (59.84\")   Wt: 85.1 kg (187 lb 11.2 oz)    Admission Cmt: None   Principal Problem: Hypoxia [R09.02]                   Active Insurance as of 1/15/2024       Primary Coverage       Payor Plan Insurance Group Employer/Plan Group    MEDICARE MEDICARE A & B        Payor Plan Address Payor Plan Phone Number Payor Plan Fax Number Effective Dates    [address] [phone]  2022 - None Entered    [place] [id number]         Subscriber Name Subscriber Birth Date Member ID       PREMA SUNG 1955 [id number]                     Emergency Contacts        (Rel.) Home Phone Work Phone Mobile Phone    Idalia Lerma (Daughter) -- -- [phone]    Thang Horton (Sister) [phone] -- --                   Discharge Summary        Alexandra De Leon MD at 24 0838              [facility]  DISCHARGE SUMMARY    Patient Name: Prema Sung  : 1955  MRN: [id number]    Date of Admission: 1/15/2024  1:04 AM  Date of Discharge: 2024  Primary Care Physician: Deann Cao MD    Consults       No orders found from 2023 to 2024.            Hospital Course     Presenting " Problem: hypoxia    Active Hospital Problems    Diagnosis  POA    **Hypoxia [R09.02]  Yes    Aspiration pneumonia [J69.0]  Yes    Dementia [F03.90]  Yes    S/P transmetatarsal amputation of foot, left [Z89.432]  Not Applicable    Anemia of chronic disease [D63.8]  Yes    Personal history of Methicillin resistant Staphylococcus aureus infection [Z86.14]  Yes    Neurocognitive disorder [R41.9]  Yes    Type 2 diabetes mellitus [E11.9]  Yes    Essential hypertension [I10]  Yes    Psychotic disorder [F29]  Yes    Type 2 diabetes mellitus with diabetic neuropathy, unspecified [E11.40]  Yes    Type 2 diabetes mellitus with diabetic chronic kidney disease [E11.22]  Yes      Resolved Hospital Problems   No resolved problems to display.          Hospital Course:  Omkar Nava is a 68 y.o. male with history of chronic systolic heart failure, type 2 diabetes on insulin, anemia, hypertension, CKD, history of osteomyelitis status post left TMA, dementia presenting with acute hypoxemic respiratory failure.  He was admitted to hospitalist service with concern for multifocal pneumonia.     This patient's problems and plans were partially entered by my partner and updated as appropriate by me 01/20/24.     Acute on chronic hypoxemic respiratory failure secondary to,  Multifocal pneumonia with risk factors for healthcare associated infection  Acute on chronic exacerbation of systolic heart failure  --initially required HFNC, now on NC and seems relatively stable  -- Chest x-ray showed multifocal opacities greater on right than left concerning for multifocal pneumonia versus asymmetric pulmonary edema.    --placed on vanc (transitioned to linezolid on 1/18), cefepime for concern of healthcare acquired PNA w recent facility stay --> to complete total of 7 days of therapy (will switch to all PO to complete at time of discharge). Resp panel neg, legionella and strep neg. MRSA detected. Blood cultures no growth  -Echo shows EF of 56 to  60% and left ventricle hypertrophy.  -Continue home carvedilol, resumed torsemide.    -He no longer appears to be taking SGLT2 or ACE inhibitor. Follow-up with heart failure clinic to work on GDMT     Acute kidney injury on CKD 3  -Baseline creatinine appears to be ~1.3, he is 1.64 on admission and then trended back up --> improved  -resumed torsemide      Elevated serum troponin level  -Troponin 83 on admission without ischemic changes on EKG or complaints of chest pain.  Trending down.  Suspect demand ischemia secondary to lung pathology and worsened by CKD     Anemia of chronic disease  -Baseline hemoglobin around 8, he was 7.6 on admission.  No signs of active bleeding.  iron is low, panel overall consistent w AOCD. Given new O2 requirement he was transfused on 1/16 1 unit of packed red blood cells.      Hypertension  -added back amlodipine and coreg from home regimen, resumed hydralazine, torsemide     Type 2 diabetes  -Uses 6 units of insulin glargine at skilled nursing facility. continue SSI for now while hospitalized and on DC given patient didn't require significant amounts of insulin.      History of dementia with behavioral disturbance  Insomnia  -Continue home medications  --complicates all aspects of care, high risk of hospital delirium    Fall  --Unwitnessed overnight; CT head negative; moves all extremities normally and without pain  --Patient's daughter (Idalia) updated on the fall    Called the patient's daughter and updated on plan of care and the patient's fall from overnight    Discharge Follow Up Recommendations for outpatient labs/diagnostics:  --Follow-up with primary care doctor in 5 to 7 days regarding this hospitalization, chronic disease management, repeat labs (CBC, CMP)  --Repeat Chest X-ray in 1 month  --Follow-up with heart failure clinic in 1 week to work on GDMT    Antibiotics:  Complete Linezolid on evening of 1/24/2024  Complete Levaquin on 1/22/2024  Resume Trazodone, Fluoxetine  on 1/25/2024    Day of Discharge     HPI:   Patient denied pain.  He asked for help with eating his eggs this morning, as he states he always gets them all over himself.  Patient had a fall overnight and CT head was obtained that showed no bleeding or trauma. The patient's daughter was updated the fall. NC was not in his nostrils and he was satting 95%.     Review of Systems  Gen- No fevers, chills  CV- No chest pain, palpitations  Resp- No cough, dyspnea  GI- No N/V/D, abd pain    Vital Signs:   Temp:  [97.1 °F (36.2 °C)-98.3 °F (36.8 °C)] 98.3 °F (36.8 °C)  Heart Rate:  [61-77] 73  Resp:  [16-18] 18  BP: (152-177)/(64-84) 161/74  Flow (L/min):  [2] 2      Physical Exam:  Constitutional: No acute distress, awake, alert  HENT: NCAT, mucous membranes moist  Respiratory: Bibasilar crackles, respiratory effort normal   Cardiovascular: RRR, no murmurs, rubs, or gallops  Gastrointestinal: Normoactive bowel sounds, soft, nontender, nondistended  Musculoskeletal: No bilateral ankle edema  Psychiatric: Appropriate affect, cooperative  Neurologic: PERRL, symmetric facies, speech clear, oriented to self and 2024, unsure of location  Skin: No rashes on exposed skin    Pertinent  and/or Most Recent Results     LAB RESULTS:      Lab 01/19/24  0442 01/18/24  0412 01/17/24  0404 01/16/24  1744 01/16/24  0709 01/15/24  0616 01/15/24  0325 01/15/24  0118   WBC 7.07 6.42 6.60  --  5.90 8.26  --  8.26   HEMOGLOBIN 8.2* 7.8* 7.4* 8.0* 6.4* 7.1*  --  7.6*   HEMATOCRIT 25.3* 24.0* 23.3* 24.7* 20.7* 22.3*  --  25.0*   PLATELETS 204 198 162  --  152 153  --  155   NEUTROS ABS 4.96 3.84 3.92  --  3.39  --   --  6.42   IMMATURE GRANS (ABS) 0.02 0.02 0.01  --  0.02  --   --  0.03   LYMPHS ABS 1.17 1.46 1.63  --  1.50  --   --  0.79   MONOS ABS 0.63 0.87 0.80  --  0.82  --   --  0.85   EOS ABS 0.27 0.20 0.22  --  0.15  --   --  0.15   MCV 89.4 91.3 91.0  --  94.5 94.9  --  98.0*   PROCALCITONIN  --   --   --   --   --   --  0.26*  --     LACTATE  --   --   --   --   --   --   --  1.6   D DIMER QUANT  --   --   --   --   --   --   --  0.83*         Lab 01/21/24  0418 01/20/24  0712 01/19/24  0442 01/18/24  0412 01/17/24  0404 01/16/24  0709 01/15/24  1143 01/15/24  0614   SODIUM 135* 141 139 144 138 143   < > 140   POTASSIUM 4.0 4.3 4.0 4.1 4.0 4.1   < > 5.1   CHLORIDE 98 100 101 106 102 107   < > 107   CO2 25.0 30.0* 28.0 30.0* 26.0 26.0   < > 25.0   ANION GAP 12.0 11.0 10.0 8.0 10.0 10.0   < > 8.0   BUN 24* 25* 29* 31* 30* 26*   < > 32*   CREATININE 1.41* 1.33* 1.44* 1.64* 1.49* 1.52*   < > 1.56*   EGFR 55.0* 59.0* 53.6* 45.8* 51.4* 50.2*   < > 48.7*   GLUCOSE 125* 167* 175* 109* 145* 94   < > 245*   CALCIUM 9.3 9.0 9.2 8.6 8.5* 8.4*   < > 8.4*   MAGNESIUM  --   --   --  2.0 1.9 2.0  --  2.4    < > = values in this interval not displayed.         Lab 01/15/24  0118   TOTAL PROTEIN 7.0   ALBUMIN 3.5   GLOBULIN 3.5   ALT (SGPT) 47*   AST (SGOT) 32   BILIRUBIN 0.2   ALK PHOS 110         Lab 01/15/24  0614 01/15/24  0325 01/15/24  0118   PROBNP  --   --  905.7*   HSTROP T 73* 97* 83*             Lab 01/16/24  0833 01/15/24  0614   IRON  --  17*   IRON SATURATION (TSAT)  --  6*   TIBC  --  289*   TRANSFERRIN  --  194*   FERRITIN  --  312.80   ABO TYPING O  --    RH TYPING Positive  --    ANTIBODY SCREEN Negative  --          Lab 01/15/24  0204   PH, ARTERIAL 7.366   PCO2, ARTERIAL 43.2   PO2 ART 87.8   FIO2 45   HCO3 ART 24.7   BASE EXCESS ART -0.7*   CARBOXYHEMOGLOBIN 1.9     Brief Urine Lab Results  (Last result in the past 365 days)        Color   Clarity   Blood   Leuk Est   Nitrite   Protein   CREAT   Urine HCG        08/02/23 1319 Yellow   Clear   Negative   Negative   Negative   30 mg/dL (1+)                 Microbiology Results (last 10 days)       Procedure Component Value - Date/Time    MRSA Screen, PCR (Inpatient) - Swab, Nares [[id number]]  (Abnormal) Collected: 01/15/24 0532    Lab Status: Final result Specimen: Swab from Nares Updated:  01/15/24 1226     MRSA PCR MRSA Detected    Narrative:      The negative predictive value of this diagnostic test is high and should only be used to consider de-escalating anti-MRSA therapy. A positive result may indicate colonization with MRSA and must be correlated clinically.    Legionella Antigen, Urine - Urine, Urine, Clean Catch [[id number]]  (Normal) Collected: 01/15/24 0250    Lab Status: Final result Specimen: Urine, Clean Catch Updated: 01/15/24 1102     LEGIONELLA ANTIGEN, URINE Negative    S. Pneumo Ag Urine or CSF - Urine, Urine, Clean Catch [[id number]]  (Normal) Collected: 01/15/24 0250    Lab Status: Final result Specimen: Urine, Clean Catch Updated: 01/15/24 1102     Strep Pneumo Ag Negative    Blood Culture - Blood, Arm, Left [[id number]]  (Normal) Collected: 01/15/24 0118    Lab Status: Final result Specimen: Blood from Arm, Left Updated: 01/20/24 0245     Blood Culture No growth at 5 days    Blood Culture - Blood, Arm, Right [[id number]]  (Normal) Collected: 01/15/24 0118    Lab Status: Final result Specimen: Blood from Arm, Right Updated: 01/20/24 0245     Blood Culture No growth at 5 days    Respiratory Panel PCR w/COVID-19(SARS-CoV-2) G[facility]/ANNA/[facility] In-House, NP Swab in UTM/VTM, 2 HR TAT - Swab, Nasopharynx [[id number]]  (Normal) Collected: 01/15/24 0115    Lab Status: Final result Specimen: Swab from Nasopharynx Updated: 01/15/24 0225     ADENOVIRUS, PCR Not Detected     Coronavirus 229E Not Detected     Coronavirus HKU1 Not Detected     Coronavirus NL63 Not Detected     Coronavirus OC43 Not Detected     COVID19 Not Detected     Human Metapneumovirus Not Detected     Human Rhinovirus/Enterovirus Not Detected     Influenza A PCR Not Detected     Influenza B PCR Not Detected     Parainfluenza Virus 1 Not Detected     Parainfluenza Virus 2 Not Detected     Parainfluenza Virus 3 Not Detected     Parainfluenza Virus 4 Not Detected     RSV, PCR Not Detected     Bordetella pertussis pcr Not  Detected     Bordetella parapertussis PCR Not Detected     Chlamydophila pneumoniae PCR Not Detected     Mycoplasma pneumo by PCR Not Detected    Narrative:      In the setting of a positive respiratory panel with a viral infection PLUS a negative procalcitonin without other underlying concern for bacterial infection, consider observing off antibiotics or discontinuation of antibiotics and continue supportive care. If the respiratory panel is positive for atypical bacterial infection (Bordetella pertussis, Chlamydophila pneumoniae, or Mycoplasma pneumoniae), consider antibiotic de-escalation to target atypical bacterial infection.            CT Head Without Contrast    Result Date: 1/20/2024  CT HEAD WO CONTRAST Date of Exam: 1/20/2024 9:35 PM EST Indication: unwitnessed fall. Comparison: 7/31/2023. Technique: Axial CT images were obtained of the head without contrast administration.  Automated exposure control and iterative construction methods were used. Findings: There is postop change to the right orbital globe with hyperdense material filling the posterior vitreous. There is mild patchy periventricular white matter hypoattenuation. The no new hypoattenuating lesions in the brain. No acute intracranial hemorrhage. Cerebellum, brainstem and basal ganglia appear within normal limits. There is mild age-appropriate generalized parenchymal volume loss. There is thinning of the orbital lenses bilaterally. The mastoid air cells and paranasal sinuses appear well aerated. The calvarium is intact. Superficial soft tissues are unremarkable.     Impression: 1.No acute intracranial abnormality. 2.Mild chronic small vessel ischemic change and mild generalized parenchymal volume loss. 3.Postoperative changes to the right orbital globe. Electronically Signed: Rayray Mcclain MD  1/20/2024 9:45 PM EST  Workstation ID: [id number]    Adult Transthoracic Echo Complete With Contrast if Necessary Per Protocol    Result Date: 1/17/2024     Left ventricular ejection fraction appears to be 56 - 60%.   Left ventricular wall thickness is consistent with hypertrophy.   Estimated right ventricular systolic pressure from tricuspid regurgitation is mildly elevated (35-45 mmHg).   There is a small (1-2cm) pericardial effusion.   No evidence for pericardial tamponade     XR Abdomen KUB    Result Date: 1/16/2024  XR ABDOMEN KUB Date of Exam: 1/16/2024 10:33 AM EST Indication: guarding on exam, concern for ileus/obstruction Comparison: CT abdomen pelvis 7/31/2023. Findings: Nonobstructive bowel gas pattern. Large volume of formed stool throughout the colon.     Impression: No evidence of bowel obstruction. Large stool burden, suggestive of constipation. Electronically Signed: Anjum Prado MD  1/16/2024 11:15 AM EST  Workstation ID: [id number]    XR Chest 1 View    Result Date: 1/15/2024  XR CHEST 1 VW Date of Exam: 1/15/2024 2:05 AM EST Indication: SOA triage protocol Comparison: Chest radiograph July 31, 2023 Findings: The heart size normal. There are diffuse bilateral alveolar and interstitial airspace opacities greater on the right than the left. Findings are most likely secondary to multifocal pneumonia. Differential would include asymmetric pulmonary edema.     Impression: Multifocal alveolar and interstitial opacities favors pneumonia. Electronically Signed: Boby Riggs MD  1/15/2024 2:36 AM EST  Workstation ID: [id number]             Results for orders placed during the hospital encounter of 01/15/24    Adult Transthoracic Echo Complete With Contrast if Necessary Per Protocol    Interpretation Summary    Left ventricular ejection fraction appears to be 56 - 60%.    Left ventricular wall thickness is consistent with hypertrophy.    Estimated right ventricular systolic pressure from tricuspid regurgitation is mildly elevated (35-45 mmHg).    There is a small (1-2cm) pericardial effusion.    No evidence for pericardial tamponade      Plan for Follow-up of  Pending Labs/Results:    Discharge Details        Discharge Medications        New Medications        Instructions Start Date   Insulin Lispro 100 UNIT/ML injection  Commonly known as: humaLOG   2-7 Units, Subcutaneous, 4 Times Daily Before Meals & Nightly      levoFLOXacin 750 MG tablet  Commonly known as: Levaquin   750 mg, Oral, Daily   Start Date: January 22, 2024     linezolid 600 MG tablet  Commonly known as: ZYVOX   600 mg, Oral, Every 12 Hours Scheduled      traZODone 50 MG tablet  Commonly known as: DESYREL  Replaces: traZODone 25 MG half tablet   25 mg, Oral, Nightly, Resume on 1/25/2024   Start Date: January 25, 2024            Changes to Medications        Instructions Start Date   amLODIPine 10 MG tablet  Commonly known as: NORVASC  What changed:   medication strength  how much to take   10 mg, Oral, Every 24 Hours Scheduled      FLUoxetine 20 MG capsule  Commonly known as: PROzac  What changed:   additional instructions  These instructions start on January 25, 2024. If you are unsure what to do until then, ask your doctor or other care provider.   20 mg, Oral, 2 Times Daily, Resume on 1/25/2024   Start Date: January 25, 2024            Continue These Medications        Instructions Start Date   acetaminophen 325 MG tablet  Commonly known as: TYLENOL   650 mg, Oral, Every 6 Hours PRN      albuterol sulfate  (90 Base) MCG/ACT inhaler  Commonly known as: PROVENTIL HFA;VENTOLIN HFA;PROAIR HFA   2 puffs, Inhalation, Every 4 Hours PRN      atorvastatin 20 MG tablet  Commonly known as: LIPITOR   20 mg, Oral, Nightly      brimonidine-timolol 0.2-0.5 % ophthalmic solution  Commonly known as: COMBIGAN   1 drop, Both Eyes, 2 Times Daily      carvedilol 25 MG tablet  Commonly known as: COREG   25 mg, Oral, 2 Times Daily With Meals      Cholecalciferol 50 MCG (2000 UT) tablet   2 capsules, Oral, Every Morning      cloNIDine 0.1 MG tablet  Commonly known as: CATAPRES   0.1 mg, Oral, 4 Times Daily PRN, AS  NEEDED FOR SBP GREATER THAN 160      divalproex 125 MG DR tablet  Commonly known as: DEPAKOTE   375 mg, Oral, 2 Times Daily      docusate sodium 100 MG capsule  Commonly known as: COLACE   100 mg, Oral, 2 Times Daily PRN      famotidine 20 MG tablet  Commonly known as: PEPCID   20 mg, Oral, 2 Times Daily      ferrous sulfate 325 (65 FE) MG tablet   325 mg, Oral, Daily With Breakfast      hydrALAZINE 25 MG tablet  Commonly known as: APRESOLINE   50 mg, Oral, 3 Times Daily      hydrOXYzine 50 MG tablet  Commonly known as: ATARAX   50 mg, Oral, Every 6 Hours PRN      melatonin 5 MG tablet tablet   5 mg, Oral, Nightly      thiamine 100 MG tablet  tablet  Commonly known as: VITAMIN B-1   100 mg, Oral, 3 Times Daily      torsemide 10 MG tablet  Commonly known as: DEMADEX   10 mg, Oral, Daily             Stop These Medications      insulin glargine 100 UNIT/ML injection  Commonly known as: LANTUS, SEMGLEE     ketorolac 0.5 % ophthalmic solution  Commonly known as: ACULAR     traZODone 25 MG half tablet  Commonly known as: DESYREL  Replaced by: traZODone 50 MG tablet              No Known Allergies      Discharge Disposition:  Skilled Nursing Facility ([place] - External)    Diet:  Hospital:  Diet Order   Procedures    Diet: Cardiac Diets, Diabetic Diets; Healthy Heart (2-3 Na+); Consistent Carbohydrate; Texture: Regular Texture (IDDSI 7); Fluid Consistency: Thin (IDDSI 0)       Diet Instructions       Diet: Cardiac Diets, Diabetic Diets; Healthy Heart (2-3 Na+); Regular Texture (IDDSI 7); Thin (IDDSI 0); Consistent Carbohydrate      Discharge Diet:  Cardiac Diets  Diabetic Diets       Cardiac Diet: Healthy Heart (2-3 Na+)    Texture: Regular Texture (IDDSI 7)    Fluid Consistency: Thin (IDDSI 0)    Diabetic Diet: Consistent Carbohydrate             Activity:      Restrictions or Other Recommendations:       CODE STATUS:    Code Status and Medical Interventions:   Ordered at: 01/15/24 1193     Code Status (Patient has no pulse  and is not breathing):    CPR (Attempt to Resuscitate)     Medical Interventions (Patient has pulse or is breathing):    Full Support       Future Appointments   Date Time Provider Department Center   1/21/2024  2:30 PM [facility]       Additional Instructions for the Follow-ups that You Need to Schedule       Call MD With Problems / Concerns   As directed      Please seek medical attention for any the following: Difficulty breathing, chest pain, seizure-like activity, and/or any other concerning symptoms    Order Comments: Please seek medical attention for any the following: Difficulty breathing, chest pain, seizure-like activity, and/or any other concerning symptoms         Discharge Follow-up with PCP   As directed       Currently Documented PCP:    Deann Cao MD    PCP Phone Number:    [phone]     Follow Up Details: Follow-up with primary care doctor in 5 to 7 days regarding this hospitalization, chronic disease management, repeat labs (CBC, CMP)                      Alexandra De Leon MD  01/21/24      Time Spent on Discharge:  I spent  50  minutes on this discharge activity which included: face-to-face encounter with the patient, reviewing the data in the system, coordination of the care with the nursing staff as well as consultants, documentation, and entering orders.            Electronically signed by Alexandra De Leon MD at 01/21/24 4235

## 2024-01-21 NOTE — CASE MANAGEMENT/SOCIAL WORK
Case Management Discharge Note      Final Note: Patient discharging to [facility] today. Nurse to call report to [phone]. CM will fax the discharge summary to [phone]. Transport with [facility] at 2:15 pm. PCS is uploaded to [employer]. Pharmacy changed to [facility]. There is no other discharge needs at this time.         Selected Continued Care - Admitted Since 1/15/2024       Destination Coordination complete.      Service Provider Selected Services Address Phone Fax Patient Preferred    [facility] [address] , [facility] [address] [phone] --       Internal Comment last updated by Rosemarie Yarbrough RN 1/18/2024 1150    Does not have a bed hold    1/18: Spoke with REEMA Ramires to LTC for now. Not med ready today.                          Durable Medical Equipment    No services have been selected for the patient.                Dialysis/Infusion    No services have been selected for the patient.                Home Medical Care    No services have been selected for the patient.                Therapy    No services have been selected for the patient.                Community Resources    No services have been selected for the patient.                Community & DME    No services have been selected for the patient.                    Transportation Services  Ambulance: [facility] (1/20/24 at 1015)    Final Discharge Disposition Code: 03 - skilled nursing facility (SNF)

## 2024-01-21 NOTE — DISCHARGE PLACEMENT REQUEST
"Prema Sung (68 y.o. Male)       Date of Birth   1955    Social Security Number       Address   [address] [place] [address]    Home Phone   [phone]    MRN   [id number]       [employer]   None    Marital Status   Single                            Admission Date   1/15/24    Admission Type   Emergency    Admitting Provider   Alexandra De Leon MD    Attending Provider   Alexandra De Leon MD    Department, Room/Bed   [facility] 6A, N619/1       Discharge Date       Discharge Disposition   Skilled Nursing Facility (DC - External)    Discharge Destination                                 Attending Provider: Alexandra De Leon MD    Allergies: No Known Allergies    Isolation: None   Infection: MRSA (23)   Code Status: CPR    Ht: 152 cm (59.84\")   Wt: 85.1 kg (187 lb 11.2 oz)    Admission Cmt: None   Principal Problem: Hypoxia [R09.02]                   Active Insurance as of 1/15/2024       Primary Coverage       Payor Plan Insurance Group Employer/Plan Group    MEDICARE MEDICARE A & B        Payor Plan Address Payor Plan Phone Number Payor Plan Fax Number Effective Dates    [address] [phone]  2022 - None Entered    [place] [id number]         Subscriber Name Subscriber Birth Date Member ID       PREMA SUNG 1955 [id number]                     Emergency Contacts        (Rel.) Home Phone Work Phone Mobile Phone    Idalia Lerma (Daughter) -- -- [phone]    Thang Horton (Sister) [phone] -- --                   Discharge Summary        Alexandra De Leon MD at 24 0838              [facility] Medicine Services  DISCHARGE SUMMARY    Patient Name: Prema Sung  : 1955  MRN: [id number]    Date of Admission: 1/15/2024  1:04 AM  Date of Discharge: 2024  Primary Care Physician: Deann Cao MD    Consults       No orders found from 2023 to 2024.            Hospital Course     Presenting " Problem: hypoxia    Active Hospital Problems    Diagnosis  POA    **Hypoxia [R09.02]  Yes    Aspiration pneumonia [J69.0]  Yes    Dementia [F03.90]  Yes    S/P transmetatarsal amputation of foot, left [Z89.432]  Not Applicable    Anemia of chronic disease [D63.8]  Yes    Personal history of Methicillin resistant Staphylococcus aureus infection [Z86.14]  Yes    Neurocognitive disorder [R41.9]  Yes    Type 2 diabetes mellitus [E11.9]  Yes    Essential hypertension [I10]  Yes    Psychotic disorder [F29]  Yes    Type 2 diabetes mellitus with diabetic neuropathy, unspecified [E11.40]  Yes    Type 2 diabetes mellitus with diabetic chronic kidney disease [E11.22]  Yes      Resolved Hospital Problems   No resolved problems to display.          Hospital Course:  Omkar Nava is a 68 y.o. male with history of chronic systolic heart failure, type 2 diabetes on insulin, anemia, hypertension, CKD, history of osteomyelitis status post left TMA, dementia presenting with acute hypoxemic respiratory failure.  He was admitted to hospitalist service with concern for multifocal pneumonia.     This patient's problems and plans were partially entered by my partner and updated as appropriate by me 01/20/24.     Acute on chronic hypoxemic respiratory failure secondary to,  Multifocal pneumonia with risk factors for healthcare associated infection  Acute on chronic exacerbation of systolic heart failure  --initially required HFNC, now on NC and seems relatively stable  -- Chest x-ray showed multifocal opacities greater on right than left concerning for multifocal pneumonia versus asymmetric pulmonary edema.    --placed on vanc (transitioned to linezolid on 1/18), cefepime for concern of healthcare acquired PNA w recent facility stay --> to complete total of 7 days of therapy (will switch to all PO to complete at time of discharge). Resp panel neg, legionella and strep neg. MRSA detected. Blood cultures no growth  -Echo shows EF of 56 to  60% and left ventricle hypertrophy.  -Continue home carvedilol, resumed torsemide.    -He no longer appears to be taking SGLT2 or ACE inhibitor. Follow-up with heart failure clinic to work on GDMT     Acute kidney injury on CKD 3  -Baseline creatinine appears to be ~1.3, he is 1.64 on admission and then trended back up --> improved  -resumed torsemide      Elevated serum troponin level  -Troponin 83 on admission without ischemic changes on EKG or complaints of chest pain.  Trending down.  Suspect demand ischemia secondary to lung pathology and worsened by CKD     Anemia of chronic disease  -Baseline hemoglobin around 8, he was 7.6 on admission.  No signs of active bleeding.  iron is low, panel overall consistent w AOCD. Given new O2 requirement he was transfused on 1/16 1 unit of packed red blood cells.      Hypertension  -added back amlodipine and coreg from home regimen, resumed hydralazine, torsemide     Type 2 diabetes  -Uses 6 units of insulin glargine at skilled nursing facility. continue SSI for now while hospitalized     History of dementia with behavioral disturbance  Insomnia  -Continue home medications  --complicates all aspects of care, high risk of hospital delirium    Fall  --Unwitnessed overnight; CT head negative; moves all extremities normally and without pain  --Patient's daughter (Idalia) updated on the fall    Called the patient's daughter and updated on plan of care and the patient's fall from overnight    Discharge Follow Up Recommendations for outpatient labs/diagnostics:  --Follow-up with primary care doctor in 5 to 7 days regarding this hospitalization, chronic disease management, repeat labs (CBC, CMP)  --Repeat Chest X-ray in 1 month  --Follow-up with heart failure clinic in 1 week to work on GDMT    Antibiotics:  Complete Linezolid on evening of 1/24/2024  Complete Levaquin on 1/22/2024  Resume Trazodone, Fluoxetine on 1/25/2024    Day of Discharge     HPI:   Patient denied pain.  He  asked for help with eating his eggs this morning, as he states he always gets them all over himself.  Patient had a fall overnight and CT head was obtained that showed no bleeding or trauma. The patient's daughter was updated the fall. NC was not in his nostrils and he was satting 95%.     Review of Systems  Gen- No fevers, chills  CV- No chest pain, palpitations  Resp- No cough, dyspnea  GI- No N/V/D, abd pain    Vital Signs:   Temp:  [97.1 °F (36.2 °C)-98.1 °F (36.7 °C)] 98.1 °F (36.7 °C)  Heart Rate:  [61-77] 73  Resp:  [16-18] 16  BP: (152-177)/(64-84) 152/64  Flow (L/min):  [2] 2      Physical Exam:  Constitutional: No acute distress, awake, alert  HENT: NCAT, mucous membranes moist  Respiratory: Bibasilar crackles, respiratory effort normal   Cardiovascular: RRR, no murmurs, rubs, or gallops  Gastrointestinal: Normoactive bowel sounds, soft, nontender, nondistended  Musculoskeletal: No bilateral ankle edema  Psychiatric: Appropriate affect, cooperative  Neurologic: PERRL, symmetric facies, speech clear, oriented to self and 2024, unsure of location  Skin: No rashes on exposed skin    Pertinent  and/or Most Recent Results     LAB RESULTS:      Lab 01/19/24  0442 01/18/24  0412 01/17/24  0404 01/16/24  1744 01/16/24  0709 01/15/24  0616 01/15/24  0325 01/15/24  0118   WBC 7.07 6.42 6.60  --  5.90 8.26  --  8.26   HEMOGLOBIN 8.2* 7.8* 7.4* 8.0* 6.4* 7.1*  --  7.6*   HEMATOCRIT 25.3* 24.0* 23.3* 24.7* 20.7* 22.3*  --  25.0*   PLATELETS 204 198 162  --  152 153  --  155   NEUTROS ABS 4.96 3.84 3.92  --  3.39  --   --  6.42   IMMATURE GRANS (ABS) 0.02 0.02 0.01  --  0.02  --   --  0.03   LYMPHS ABS 1.17 1.46 1.63  --  1.50  --   --  0.79   MONOS ABS 0.63 0.87 0.80  --  0.82  --   --  0.85   EOS ABS 0.27 0.20 0.22  --  0.15  --   --  0.15   MCV 89.4 91.3 91.0  --  94.5 94.9  --  98.0*   PROCALCITONIN  --   --   --   --   --   --  0.26*  --    LACTATE  --   --   --   --   --   --   --  1.6   D DIMER QUANT  --   --    --   --   --   --   --  0.83*         Lab 01/21/24  0418 01/20/24  0712 01/19/24  0442 01/18/24  0412 01/17/24  0404 01/16/24  0709 01/15/24  1143 01/15/24  0614   SODIUM 135* 141 139 144 138 143   < > 140   POTASSIUM 4.0 4.3 4.0 4.1 4.0 4.1   < > 5.1   CHLORIDE 98 100 101 106 102 107   < > 107   CO2 25.0 30.0* 28.0 30.0* 26.0 26.0   < > 25.0   ANION GAP 12.0 11.0 10.0 8.0 10.0 10.0   < > 8.0   BUN 24* 25* 29* 31* 30* 26*   < > 32*   CREATININE 1.41* 1.33* 1.44* 1.64* 1.49* 1.52*   < > 1.56*   EGFR 55.0* 59.0* 53.6* 45.8* 51.4* 50.2*   < > 48.7*   GLUCOSE 125* 167* 175* 109* 145* 94   < > 245*   CALCIUM 9.3 9.0 9.2 8.6 8.5* 8.4*   < > 8.4*   MAGNESIUM  --   --   --  2.0 1.9 2.0  --  2.4    < > = values in this interval not displayed.         Lab 01/15/24  0118   TOTAL PROTEIN 7.0   ALBUMIN 3.5   GLOBULIN 3.5   ALT (SGPT) 47*   AST (SGOT) 32   BILIRUBIN 0.2   ALK PHOS 110         Lab 01/15/24  0614 01/15/24  0325 01/15/24  0118   PROBNP  --   --  905.7*   HSTROP T 73* 97* 83*             Lab 01/16/24  0833 01/15/24  0614   IRON  --  17*   IRON SATURATION (TSAT)  --  6*   TIBC  --  289*   TRANSFERRIN  --  194*   FERRITIN  --  312.80   ABO TYPING O  --    RH TYPING Positive  --    ANTIBODY SCREEN Negative  --          Lab 01/15/24  0204   PH, ARTERIAL 7.366   PCO2, ARTERIAL 43.2   PO2 ART 87.8   FIO2 45   HCO3 ART 24.7   BASE EXCESS ART -0.7*   CARBOXYHEMOGLOBIN 1.9     Brief Urine Lab Results  (Last result in the past 365 days)        Color   Clarity   Blood   Leuk Est   Nitrite   Protein   CREAT   Urine HCG        08/02/23 1319 Yellow   Clear   Negative   Negative   Negative   30 mg/dL (1+)                 Microbiology Results (last 10 days)       Procedure Component Value - Date/Time    MRSA Screen, PCR (Inpatient) - Swab, Nares [[id number]]  (Abnormal) Collected: 01/15/24 0532    Lab Status: Final result Specimen: Swab from Nares Updated: 01/15/24 1226     MRSA PCR MRSA Detected    Narrative:      The negative  predictive value of this diagnostic test is high and should only be used to consider de-escalating anti-MRSA therapy. A positive result may indicate colonization with MRSA and must be correlated clinically.    Legionella Antigen, Urine - Urine, Urine, Clean Catch [[id number]]  (Normal) Collected: 01/15/24 0250    Lab Status: Final result Specimen: Urine, Clean Catch Updated: 01/15/24 1102     LEGIONELLA ANTIGEN, URINE Negative    S. Pneumo Ag Urine or CSF - Urine, Urine, Clean Catch [[id number]]  (Normal) Collected: 01/15/24 0250    Lab Status: Final result Specimen: Urine, Clean Catch Updated: 01/15/24 1102     Strep Pneumo Ag Negative    Blood Culture - Blood, Arm, Left [[id number]]  (Normal) Collected: 01/15/24 0118    Lab Status: Final result Specimen: Blood from Arm, Left Updated: 01/20/24 0245     Blood Culture No growth at 5 days    Blood Culture - Blood, Arm, Right [[id number]]  (Normal) Collected: 01/15/24 0118    Lab Status: Final result Specimen: Blood from Arm, Right Updated: 01/20/24 0245     Blood Culture No growth at 5 days    Respiratory Panel PCR w/COVID-19(SARS-CoV-2) [facility]/ANNA/PAD/COR/ASHIA In-House, NP Swab in UTM/VTM, 2 HR TAT - Swab, Nasopharynx [[id number]]  (Normal) Collected: 01/15/24 0115    Lab Status: Final result Specimen: Swab from Nasopharynx Updated: 01/15/24 0225     ADENOVIRUS, PCR Not Detected     Coronavirus 229E Not Detected     Coronavirus HKU1 Not Detected     Coronavirus NL63 Not Detected     Coronavirus OC43 Not Detected     COVID19 Not Detected     Human Metapneumovirus Not Detected     Human Rhinovirus/Enterovirus Not Detected     Influenza A PCR Not Detected     Influenza B PCR Not Detected     Parainfluenza Virus 1 Not Detected     Parainfluenza Virus 2 Not Detected     Parainfluenza Virus 3 Not Detected     Parainfluenza Virus 4 Not Detected     RSV, PCR Not Detected     Bordetella pertussis pcr Not Detected     Bordetella parapertussis PCR Not Detected     Chlamydophila  pneumoniae PCR Not Detected     Mycoplasma pneumo by PCR Not Detected    Narrative:      In the setting of a positive respiratory panel with a viral infection PLUS a negative procalcitonin without other underlying concern for bacterial infection, consider observing off antibiotics or discontinuation of antibiotics and continue supportive care. If the respiratory panel is positive for atypical bacterial infection (Bordetella pertussis, Chlamydophila pneumoniae, or Mycoplasma pneumoniae), consider antibiotic de-escalation to target atypical bacterial infection.            CT Head Without Contrast    Result Date: 1/20/2024  CT HEAD WO CONTRAST Date of Exam: 1/20/2024 9:35 PM EST Indication: unwitnessed fall. Comparison: 7/31/2023. Technique: Axial CT images were obtained of the head without contrast administration.  Automated exposure control and iterative construction methods were used. Findings: There is postop change to the right orbital globe with hyperdense material filling the posterior vitreous. There is mild patchy periventricular white matter hypoattenuation. The no new hypoattenuating lesions in the brain. No acute intracranial hemorrhage. Cerebellum, brainstem and basal ganglia appear within normal limits. There is mild age-appropriate generalized parenchymal volume loss. There is thinning of the orbital lenses bilaterally. The mastoid air cells and paranasal sinuses appear well aerated. The calvarium is intact. Superficial soft tissues are unremarkable.     Impression: 1.No acute intracranial abnormality. 2.Mild chronic small vessel ischemic change and mild generalized parenchymal volume loss. 3.Postoperative changes to the right orbital globe. Electronically Signed: Rayray Mcclain MD  1/20/2024 9:45 PM EST  Workstation ID: [id number]    Adult Transthoracic Echo Complete With Contrast if Necessary Per Protocol    Result Date: 1/17/2024    Left ventricular ejection fraction appears to be 56 - 60%.   Left  ventricular wall thickness is consistent with hypertrophy.   Estimated right ventricular systolic pressure from tricuspid regurgitation is mildly elevated (35-45 mmHg).   There is a small (1-2cm) pericardial effusion.   No evidence for pericardial tamponade     XR Abdomen KUB    Result Date: 1/16/2024  XR ABDOMEN KUB Date of Exam: 1/16/2024 10:33 AM EST Indication: guarding on exam, concern for ileus/obstruction Comparison: CT abdomen pelvis 7/31/2023. Findings: Nonobstructive bowel gas pattern. Large volume of formed stool throughout the colon.     Impression: No evidence of bowel obstruction. Large stool burden, suggestive of constipation. Electronically Signed: Anjum Prado MD  1/16/2024 11:15 AM EST  Workstation ID: [id number]    XR Chest 1 View    Result Date: 1/15/2024  XR CHEST 1 VW Date of Exam: 1/15/2024 2:05 AM EST Indication: SOA triage protocol Comparison: Chest radiograph July 31, 2023 Findings: The heart size normal. There are diffuse bilateral alveolar and interstitial airspace opacities greater on the right than the left. Findings are most likely secondary to multifocal pneumonia. Differential would include asymmetric pulmonary edema.     Impression: Multifocal alveolar and interstitial opacities favors pneumonia. Electronically Signed: Boby Riggs MD  1/15/2024 2:36 AM EST  Workstation ID: [id number]             Results for orders placed during the hospital encounter of 01/15/24    Adult Transthoracic Echo Complete With Contrast if Necessary Per Protocol    Interpretation Summary    Left ventricular ejection fraction appears to be 56 - 60%.    Left ventricular wall thickness is consistent with hypertrophy.    Estimated right ventricular systolic pressure from tricuspid regurgitation is mildly elevated (35-45 mmHg).    There is a small (1-2cm) pericardial effusion.    No evidence for pericardial tamponade      Plan for Follow-up of Pending Labs/Results:    Discharge Details        Discharge Medications         New Medications        Instructions Start Date   levoFLOXacin 750 MG tablet  Commonly known as: Levaquin   750 mg, Oral, Daily   Start Date: January 22, 2024     linezolid 600 MG tablet  Commonly known as: ZYVOX   600 mg, Oral, Every 12 Hours Scheduled      traZODone 50 MG tablet  Commonly known as: DESYREL  Replaces: traZODone 25 MG half tablet   25 mg, Oral, Nightly, Resume on 1/25/2024   Start Date: January 25, 2024            Changes to Medications        Instructions Start Date   amLODIPine 10 MG tablet  Commonly known as: NORVASC  What changed:   medication strength  how much to take   10 mg, Oral, Every 24 Hours Scheduled      FLUoxetine 20 MG capsule  Commonly known as: PROzac  What changed:   additional instructions  These instructions start on January 25, 2024. If you are unsure what to do until then, ask your doctor or other care provider.   20 mg, Oral, 2 Times Daily, Resume on 1/25/2024   Start Date: January 25, 2024            Continue These Medications        Instructions Start Date   acetaminophen 325 MG tablet  Commonly known as: TYLENOL   650 mg, Oral, Every 6 Hours PRN      albuterol sulfate  (90 Base) MCG/ACT inhaler  Commonly known as: PROVENTIL HFA;VENTOLIN HFA;PROAIR HFA   2 puffs, Inhalation, Every 4 Hours PRN      atorvastatin 20 MG tablet  Commonly known as: LIPITOR   20 mg, Oral, Nightly      brimonidine-timolol 0.2-0.5 % ophthalmic solution  Commonly known as: COMBIGAN   1 drop, Both Eyes, 2 Times Daily      carvedilol 25 MG tablet  Commonly known as: COREG   25 mg, Oral, 2 Times Daily With Meals      Cholecalciferol 50 MCG (2000 UT) tablet   2 capsules, Oral, Every Morning      cloNIDine 0.1 MG tablet  Commonly known as: CATAPRES   0.1 mg, Oral, 4 Times Daily PRN, AS NEEDED FOR SBP GREATER THAN 160      divalproex 125 MG DR tablet  Commonly known as: DEPAKOTE   375 mg, Oral, 2 Times Daily      docusate sodium 100 MG capsule  Commonly known as: COLACE   100 mg, Oral, 2 Times  Daily PRN      famotidine 20 MG tablet  Commonly known as: PEPCID   20 mg, Oral, 2 Times Daily      ferrous sulfate 325 (65 FE) MG tablet   325 mg, Oral, Daily With Breakfast      hydrALAZINE 25 MG tablet  Commonly known as: APRESOLINE   50 mg, Oral, 3 Times Daily      hydrOXYzine 50 MG tablet  Commonly known as: ATARAX   50 mg, Oral, Every 6 Hours PRN      melatonin 5 MG tablet tablet   5 mg, Oral, Nightly      thiamine 100 MG tablet  tablet  Commonly known as: VITAMIN B-1   100 mg, Oral, 3 Times Daily      torsemide 10 MG tablet  Commonly known as: DEMADEX   10 mg, Oral, Daily             Stop These Medications      ketorolac 0.5 % ophthalmic solution  Commonly known as: ACULAR     traZODone 25 MG half tablet  Commonly known as: DESYREL  Replaced by: traZODone 50 MG tablet            ASK your doctor about these medications        Instructions Start Date   insulin glargine 100 UNIT/ML injection  Commonly known as: LANTUS, SEMGLEE   5 Units, Subcutaneous, 2 Times Daily               No Known Allergies      Discharge Disposition:  Skilled Nursing Facility ([facility] - External)    Diet:  Hospital:  Diet Order   Procedures    Diet: Cardiac Diets, Diabetic Diets; Healthy Heart (2-3 Na+); Consistent Carbohydrate; Texture: Regular Texture (IDDSI 7); Fluid Consistency: Thin (IDDSI 0)       Diet Instructions       Diet: Cardiac Diets, Diabetic Diets; Healthy Heart (2-3 Na+); Regular Texture (IDDSI 7); Thin (IDDSI 0); Consistent Carbohydrate      Discharge Diet:  Cardiac Diets  Diabetic Diets       Cardiac Diet: Healthy Heart (2-3 Na+)    Texture: Regular Texture (IDDSI 7)    Fluid Consistency: Thin (IDDSI 0)    Diabetic Diet: Consistent Carbohydrate             Activity:      Restrictions or Other Recommendations:       CODE STATUS:    Code Status and Medical Interventions:   Ordered at: 01/15/24 0333     Code Status (Patient has no pulse and is not breathing):    CPR (Attempt to Resuscitate)     Medical Interventions (Patient  has pulse or is breathing):    Full Support       Future Appointments   Date Time Provider Department Center   1/21/2024  2:30 PM [facility]       Additional Instructions for the Follow-ups that You Need to Schedule       Call MD With Problems / Concerns   As directed      Please seek medical attention for any the following: Difficulty breathing, chest pain, seizure-like activity, and/or any other concerning symptoms    Order Comments: Please seek medical attention for any the following: Difficulty breathing, chest pain, seizure-like activity, and/or any other concerning symptoms         Discharge Follow-up with PCP   As directed       Currently Documented PCP:    Deann Cao MD    PCP Phone Number:    [phone]     Follow Up Details: Follow-up with primary care doctor in 5 to 7 days regarding this hospitalization, chronic disease management, repeat labs (CBC, CMP)                      Alexandra De Leon MD  01/21/24      Time Spent on Discharge:  I spent  50  minutes on this discharge activity which included: face-to-face encounter with the patient, reviewing the data in the system, coordination of the care with the nursing staff as well as consultants, documentation, and entering orders.            Electronically signed by Alexandra De Leon MD at 01/21/24 4863

## 2024-01-30 NOTE — PROGRESS NOTES
"Enter Query Response Below      Query Response: Bacterial pneumonia unspecified              If applicable, please update the problem list.     Patient: Omkar Nava        : 1955  Account: [id number]           Admit Date: 1/15/2024        How to Respond to this query:       a. Click New Note     b. Answer query within the yellow box.                c. Update the Problem List, if applicable.      If you have any questions about this query contact me at: [phone]     Dr. De Leon:    68 y.o. male with history of chronic systolic heart failure, type 2 diabetes chronic anemia, hypertension, CKD 3, dementia, presented with hypoxia.  Labs include WBC 8.26, lactate 1.6.  Respiratory panel unremarkable. Chest x-ray showed multifocal opacities greater on right than left concerning for multifocal pneumonia versus asymmetric pulmonary edema.  H&P includes \"Multifocal pneumonia with risk factors for healthcare associated infection.\"  Progress notes state aspiration pneumonia.  Urine legionella antigen and strep pneumonia negative.  MRSA detected per nasal swab.  No swallow evaluation noted on chart.  Patient given cefepime IV 1/15-.  Patient discharged on levofloxacin po.      Please clarify the type of pneumonia the patient was treated/monitored for:    Gram negative pneumonia (excluding Haemophilus influenzae)  MRSA pneumonia  Bacterial pneumonia unspecified  Aspiration pneumonia  Other- specify______  Unable to determine      By submitting this query, we are merely seeking further clarification of documentation to accurately reflect all conditions that you are monitoring, evaluating, treating or that extend the hospitalization or utilize additional resources of care. Please utilize your independent clinical judgment when addressing the question(s) above.     This query and your response, once completed, will be entered into the legal medical record.    Sincerely,  Michelle Mendoza RN, MSN  Clinical " Documentation Integrity Program   [email or web address]

## 2024-02-05 ENCOUNTER — APPOINTMENT (OUTPATIENT)
Dept: NEUROLOGY | Facility: HOSPITAL | Age: 69
End: 2024-02-05
Payer: MEDICARE

## 2024-02-05 ENCOUNTER — APPOINTMENT (OUTPATIENT)
Dept: CT IMAGING | Facility: HOSPITAL | Age: 69
End: 2024-02-05
Payer: MEDICARE

## 2024-02-05 ENCOUNTER — HOSPITAL ENCOUNTER (INPATIENT)
Facility: HOSPITAL | Age: 69
LOS: 8 days | Discharge: NURSING, REHAB OR LONG-TERM CARE | End: 2024-02-13
Attending: EMERGENCY MEDICINE | Admitting: INTERNAL MEDICINE
Payer: MEDICARE

## 2024-02-05 ENCOUNTER — APPOINTMENT (OUTPATIENT)
Dept: GENERAL RADIOLOGY | Facility: HOSPITAL | Age: 69
End: 2024-02-05
Payer: MEDICARE

## 2024-02-05 DIAGNOSIS — G93.40 ENCEPHALOPATHY: Primary | ICD-10-CM

## 2024-02-05 DIAGNOSIS — N17.9 ACUTE KIDNEY INJURY: ICD-10-CM

## 2024-02-05 DIAGNOSIS — R41.82 ALTERED MENTAL STATUS, UNSPECIFIED ALTERED MENTAL STATUS TYPE: ICD-10-CM

## 2024-02-05 DIAGNOSIS — R13.12 OROPHARYNGEAL DYSPHAGIA: ICD-10-CM

## 2024-02-05 DIAGNOSIS — R79.89 ELEVATED TROPONIN: ICD-10-CM

## 2024-02-05 PROBLEM — J96.01 ACUTE RESPIRATORY FAILURE WITH HYPOXIA: Status: ACTIVE | Noted: 2024-02-05

## 2024-02-05 PROBLEM — J18.9 BILATERAL PNEUMONIA: Status: ACTIVE | Noted: 2024-02-05

## 2024-02-05 LAB
ALBUMIN SERPL-MCNC: 3.5 G/DL (ref 3.5–5.2)
ALBUMIN/GLOB SERPL: 1 G/DL
ALP SERPL-CCNC: 89 U/L (ref 39–117)
ALT SERPL W P-5'-P-CCNC: 33 U/L (ref 1–41)
AMPHET+METHAMPHET UR QL: NEGATIVE
AMPHETAMINES UR QL: NEGATIVE
ANION GAP SERPL CALCULATED.3IONS-SCNC: 8 MMOL/L (ref 5–15)
AST SERPL-CCNC: 21 U/L (ref 1–40)
B PARAPERT DNA SPEC QL NAA+PROBE: NOT DETECTED
B PERT DNA SPEC QL NAA+PROBE: NOT DETECTED
BARBITURATES UR QL SCN: NEGATIVE
BASOPHILS # BLD AUTO: 0.01 10*3/MM3 (ref 0–0.2)
BASOPHILS NFR BLD AUTO: 0.1 % (ref 0–1.5)
BENZODIAZ UR QL SCN: NEGATIVE
BILIRUB SERPL-MCNC: 0.3 MG/DL (ref 0–1.2)
BILIRUB UR QL STRIP: NEGATIVE
BUN SERPL-MCNC: 30 MG/DL (ref 8–23)
BUN/CREAT SERPL: 16.7 (ref 7–25)
BUPRENORPHINE SERPL-MCNC: NEGATIVE NG/ML
C PNEUM DNA NPH QL NAA+NON-PROBE: NOT DETECTED
CALCIUM SPEC-SCNC: 9.9 MG/DL (ref 8.6–10.5)
CANNABINOIDS SERPL QL: NEGATIVE
CHLORIDE SERPL-SCNC: 107 MMOL/L (ref 98–107)
CK SERPL-CCNC: 199 U/L (ref 20–200)
CLARITY UR: CLEAR
CO2 SERPL-SCNC: 27 MMOL/L (ref 22–29)
COCAINE UR QL: NEGATIVE
COLOR UR: YELLOW
CREAT SERPL-MCNC: 1.8 MG/DL (ref 0.76–1.27)
D-LACTATE SERPL-SCNC: 0.9 MMOL/L (ref 0.5–2)
D-LACTATE SERPL-SCNC: 1 MMOL/L (ref 0.5–2)
DEPRECATED RDW RBC AUTO: 54.2 FL (ref 37–54)
EGFRCR SERPLBLD CKD-EPI 2021: 41 ML/MIN/1.73
EOSINOPHIL # BLD AUTO: 0.09 10*3/MM3 (ref 0–0.4)
EOSINOPHIL NFR BLD AUTO: 1.1 % (ref 0.3–6.2)
ERYTHROCYTE [DISTWIDTH] IN BLOOD BY AUTOMATED COUNT: 15.4 % (ref 12.3–15.4)
FENTANYL UR-MCNC: NEGATIVE NG/ML
FLUAV RNA RESP QL NAA+PROBE: NOT DETECTED
FLUAV SUBTYP SPEC NAA+PROBE: NOT DETECTED
FLUBV RNA ISLT QL NAA+PROBE: NOT DETECTED
FLUBV RNA RESP QL NAA+PROBE: NOT DETECTED
GEN 5 2HR TROPONIN T REFLEX: 155 NG/L
GLOBULIN UR ELPH-MCNC: 3.5 GM/DL
GLUCOSE BLDC GLUCOMTR-MCNC: 157 MG/DL (ref 70–130)
GLUCOSE BLDC GLUCOMTR-MCNC: 202 MG/DL (ref 70–130)
GLUCOSE SERPL-MCNC: 125 MG/DL (ref 65–99)
GLUCOSE UR STRIP-MCNC: NEGATIVE MG/DL
HADV DNA SPEC NAA+PROBE: NOT DETECTED
HCOV 229E RNA SPEC QL NAA+PROBE: NOT DETECTED
HCOV HKU1 RNA SPEC QL NAA+PROBE: NOT DETECTED
HCOV NL63 RNA SPEC QL NAA+PROBE: NOT DETECTED
HCOV OC43 RNA SPEC QL NAA+PROBE: NOT DETECTED
HCT VFR BLD AUTO: 25 % (ref 37.5–51)
HGB BLD-MCNC: 7.7 G/DL (ref 13–17.7)
HGB UR QL STRIP.AUTO: NEGATIVE
HMPV RNA NPH QL NAA+NON-PROBE: NOT DETECTED
HOLD SPECIMEN: NORMAL
HPIV1 RNA ISLT QL NAA+PROBE: NOT DETECTED
HPIV2 RNA SPEC QL NAA+PROBE: NOT DETECTED
HPIV3 RNA NPH QL NAA+PROBE: NOT DETECTED
HPIV4 P GENE NPH QL NAA+PROBE: NOT DETECTED
IMM GRANULOCYTES # BLD AUTO: 0.02 10*3/MM3 (ref 0–0.05)
IMM GRANULOCYTES NFR BLD AUTO: 0.3 % (ref 0–0.5)
KETONES UR QL STRIP: NEGATIVE
LEUKOCYTE ESTERASE UR QL STRIP.AUTO: NEGATIVE
LYMPHOCYTES # BLD AUTO: 1.74 10*3/MM3 (ref 0.7–3.1)
LYMPHOCYTES NFR BLD AUTO: 22.2 % (ref 19.6–45.3)
M PNEUMO IGG SER IA-ACNC: NOT DETECTED
MAGNESIUM SERPL-MCNC: 2.1 MG/DL (ref 1.6–2.4)
MCH RBC QN AUTO: 29.5 PG (ref 26.6–33)
MCHC RBC AUTO-ENTMCNC: 30.8 G/DL (ref 31.5–35.7)
MCV RBC AUTO: 95.8 FL (ref 79–97)
METHADONE UR QL SCN: NEGATIVE
MONOCYTES # BLD AUTO: 0.79 10*3/MM3 (ref 0.1–0.9)
MONOCYTES NFR BLD AUTO: 10.1 % (ref 5–12)
NEUTROPHILS NFR BLD AUTO: 5.19 10*3/MM3 (ref 1.7–7)
NEUTROPHILS NFR BLD AUTO: 66.2 % (ref 42.7–76)
NITRITE UR QL STRIP: NEGATIVE
NRBC BLD AUTO-RTO: 0 /100 WBC (ref 0–0.2)
NT-PROBNP SERPL-MCNC: 1065 PG/ML (ref 0–900)
NT-PROBNP SERPL-MCNC: 1446 PG/ML (ref 0–900)
OPIATES UR QL: NEGATIVE
OXYCODONE UR QL SCN: NEGATIVE
PCP UR QL SCN: NEGATIVE
PH UR STRIP.AUTO: <=5 [PH] (ref 5–8)
PLATELET # BLD AUTO: 243 10*3/MM3 (ref 140–450)
PMV BLD AUTO: 11.2 FL (ref 6–12)
POTASSIUM SERPL-SCNC: 5.4 MMOL/L (ref 3.5–5.2)
PROCALCITONIN SERPL-MCNC: 0.16 NG/ML (ref 0–0.25)
PROT SERPL-MCNC: 7 G/DL (ref 6–8.5)
PROT UR QL STRIP: NEGATIVE
QT INTERVAL: 360 MS
QTC INTERVAL: 394 MS
RBC # BLD AUTO: 2.61 10*6/MM3 (ref 4.14–5.8)
RHINOVIRUS RNA SPEC NAA+PROBE: NOT DETECTED
RSV RNA NPH QL NAA+NON-PROBE: NOT DETECTED
SARS-COV-2 RNA NPH QL NAA+NON-PROBE: NOT DETECTED
SARS-COV-2 RNA RESP QL NAA+PROBE: NOT DETECTED
SODIUM SERPL-SCNC: 142 MMOL/L (ref 136–145)
SP GR UR STRIP: 1.01 (ref 1–1.03)
TRICYCLICS UR QL SCN: NEGATIVE
TROPONIN T DELTA: -13 NG/L
TROPONIN T SERPL HS-MCNC: 168 NG/L
UROBILINOGEN UR QL STRIP: NORMAL
VALPROATE SERPL-MCNC: 3 MCG/ML (ref 50–125)
WBC NRBC COR # BLD AUTO: 7.84 10*3/MM3 (ref 3.4–10.8)
WHOLE BLOOD HOLD COAG: NORMAL
WHOLE BLOOD HOLD SPECIMEN: NORMAL

## 2024-02-05 PROCEDURE — 94799 UNLISTED PULMONARY SVC/PX: CPT

## 2024-02-05 PROCEDURE — 84145 PROCALCITONIN (PCT): CPT | Performed by: INTERNAL MEDICINE

## 2024-02-05 PROCEDURE — 25010000002 VANCOMYCIN 10 G RECONSTITUTED SOLUTION

## 2024-02-05 PROCEDURE — 99285 EMERGENCY DEPT VISIT HI MDM: CPT

## 2024-02-05 PROCEDURE — 82948 REAGENT STRIP/BLOOD GLUCOSE: CPT

## 2024-02-05 PROCEDURE — 80164 ASSAY DIPROPYLACETIC ACD TOT: CPT

## 2024-02-05 PROCEDURE — 85025 COMPLETE CBC W/AUTO DIFF WBC: CPT | Performed by: EMERGENCY MEDICINE

## 2024-02-05 PROCEDURE — 83605 ASSAY OF LACTIC ACID: CPT | Performed by: INTERNAL MEDICINE

## 2024-02-05 PROCEDURE — 84484 ASSAY OF TROPONIN QUANT: CPT | Performed by: EMERGENCY MEDICINE

## 2024-02-05 PROCEDURE — 36415 COLL VENOUS BLD VENIPUNCTURE: CPT

## 2024-02-05 PROCEDURE — 93005 ELECTROCARDIOGRAM TRACING: CPT | Performed by: EMERGENCY MEDICINE

## 2024-02-05 PROCEDURE — 95816 EEG AWAKE AND DROWSY: CPT | Performed by: PSYCHIATRY & NEUROLOGY

## 2024-02-05 PROCEDURE — 71045 X-RAY EXAM CHEST 1 VIEW: CPT

## 2024-02-05 PROCEDURE — 0202U NFCT DS 22 TRGT SARS-COV-2: CPT | Performed by: INTERNAL MEDICINE

## 2024-02-05 PROCEDURE — 71250 CT THORAX DX C-: CPT

## 2024-02-05 PROCEDURE — 63710000001 INSULIN LISPRO (HUMAN) PER 5 UNITS: Performed by: INTERNAL MEDICINE

## 2024-02-05 PROCEDURE — 87636 SARSCOV2 & INF A&B AMP PRB: CPT

## 2024-02-05 PROCEDURE — 87641 MR-STAPH DNA AMP PROBE: CPT | Performed by: INTERNAL MEDICINE

## 2024-02-05 PROCEDURE — 81003 URINALYSIS AUTO W/O SCOPE: CPT | Performed by: EMERGENCY MEDICINE

## 2024-02-05 PROCEDURE — 94640 AIRWAY INHALATION TREATMENT: CPT

## 2024-02-05 PROCEDURE — 82746 ASSAY OF FOLIC ACID SERUM: CPT

## 2024-02-05 PROCEDURE — 87449 NOS EACH ORGANISM AG IA: CPT | Performed by: INTERNAL MEDICINE

## 2024-02-05 PROCEDURE — 80053 COMPREHEN METABOLIC PANEL: CPT | Performed by: EMERGENCY MEDICINE

## 2024-02-05 PROCEDURE — 25810000003 SODIUM CHLORIDE 0.9 % SOLUTION

## 2024-02-05 PROCEDURE — 82607 VITAMIN B-12: CPT

## 2024-02-05 PROCEDURE — 82550 ASSAY OF CK (CPK): CPT | Performed by: EMERGENCY MEDICINE

## 2024-02-05 PROCEDURE — 80307 DRUG TEST PRSMV CHEM ANLYZR: CPT

## 2024-02-05 PROCEDURE — 25010000002 FUROSEMIDE PER 20 MG: Performed by: INTERNAL MEDICINE

## 2024-02-05 PROCEDURE — P9612 CATHETERIZE FOR URINE SPEC: HCPCS

## 2024-02-05 PROCEDURE — 25010000002 CEFEPIME PER 500 MG: Performed by: INTERNAL MEDICINE

## 2024-02-05 PROCEDURE — 87040 BLOOD CULTURE FOR BACTERIA: CPT | Performed by: INTERNAL MEDICINE

## 2024-02-05 PROCEDURE — 84484 ASSAY OF TROPONIN QUANT: CPT | Performed by: INTERNAL MEDICINE

## 2024-02-05 PROCEDURE — 92610 EVALUATE SWALLOWING FUNCTION: CPT

## 2024-02-05 PROCEDURE — 95816 EEG AWAKE AND DROWSY: CPT

## 2024-02-05 PROCEDURE — 70450 CT HEAD/BRAIN W/O DYE: CPT

## 2024-02-05 PROCEDURE — 83880 ASSAY OF NATRIURETIC PEPTIDE: CPT | Performed by: INTERNAL MEDICINE

## 2024-02-05 PROCEDURE — 72125 CT NECK SPINE W/O DYE: CPT

## 2024-02-05 PROCEDURE — 83735 ASSAY OF MAGNESIUM: CPT | Performed by: EMERGENCY MEDICINE

## 2024-02-05 RX ORDER — FERROUS SULFATE 325(65) MG
325 TABLET ORAL
Status: DISCONTINUED | OUTPATIENT
Start: 2024-02-06 | End: 2024-02-13 | Stop reason: HOSPADM

## 2024-02-05 RX ORDER — ATORVASTATIN CALCIUM 20 MG/1
20 TABLET, FILM COATED ORAL NIGHTLY
Status: DISCONTINUED | OUTPATIENT
Start: 2024-02-05 | End: 2024-02-13 | Stop reason: HOSPADM

## 2024-02-05 RX ORDER — IPRATROPIUM BROMIDE AND ALBUTEROL SULFATE 2.5; .5 MG/3ML; MG/3ML
3 SOLUTION RESPIRATORY (INHALATION)
Status: DISCONTINUED | OUTPATIENT
Start: 2024-02-05 | End: 2024-02-08

## 2024-02-05 RX ORDER — ACETAMINOPHEN 325 MG/1
650 TABLET ORAL EVERY 6 HOURS PRN
Status: DISCONTINUED | OUTPATIENT
Start: 2024-02-05 | End: 2024-02-13 | Stop reason: HOSPADM

## 2024-02-05 RX ORDER — FLUOXETINE HYDROCHLORIDE 20 MG/1
20 CAPSULE ORAL 2 TIMES DAILY
Status: DISCONTINUED | OUTPATIENT
Start: 2024-02-05 | End: 2024-02-13 | Stop reason: HOSPADM

## 2024-02-05 RX ORDER — HYDROXYZINE HYDROCHLORIDE 25 MG/1
50 TABLET, FILM COATED ORAL EVERY 6 HOURS PRN
Status: DISCONTINUED | OUTPATIENT
Start: 2024-02-05 | End: 2024-02-11

## 2024-02-05 RX ORDER — IPRATROPIUM BROMIDE AND ALBUTEROL SULFATE 2.5; .5 MG/3ML; MG/3ML
3 SOLUTION RESPIRATORY (INHALATION)
Status: DISCONTINUED | OUTPATIENT
Start: 2024-02-05 | End: 2024-02-05 | Stop reason: SDUPTHER

## 2024-02-05 RX ORDER — SODIUM CHLORIDE 9 MG/ML
40 INJECTION, SOLUTION INTRAVENOUS AS NEEDED
Status: DISCONTINUED | OUTPATIENT
Start: 2024-02-05 | End: 2024-02-13 | Stop reason: HOSPADM

## 2024-02-05 RX ORDER — DIMETHICONE 1 G/100ML
LOTION TOPICAL
COMMUNITY

## 2024-02-05 RX ORDER — POLYETHYLENE GLYCOL 3350 17 G/17G
17 POWDER, FOR SOLUTION ORAL DAILY PRN
Status: DISCONTINUED | OUTPATIENT
Start: 2024-02-05 | End: 2024-02-13 | Stop reason: HOSPADM

## 2024-02-05 RX ORDER — AMOXICILLIN 250 MG
2 CAPSULE ORAL 2 TIMES DAILY
Status: DISCONTINUED | OUTPATIENT
Start: 2024-02-05 | End: 2024-02-13 | Stop reason: HOSPADM

## 2024-02-05 RX ORDER — CARVEDILOL 12.5 MG/1
25 TABLET ORAL 2 TIMES DAILY WITH MEALS
Status: DISCONTINUED | OUTPATIENT
Start: 2024-02-05 | End: 2024-02-11

## 2024-02-05 RX ORDER — DEXTROSE MONOHYDRATE 25 G/50ML
25 INJECTION, SOLUTION INTRAVENOUS
Status: DISCONTINUED | OUTPATIENT
Start: 2024-02-05 | End: 2024-02-13 | Stop reason: HOSPADM

## 2024-02-05 RX ORDER — ONDANSETRON 2 MG/ML
4 INJECTION INTRAMUSCULAR; INTRAVENOUS EVERY 6 HOURS PRN
Status: DISCONTINUED | OUTPATIENT
Start: 2024-02-05 | End: 2024-02-13 | Stop reason: HOSPADM

## 2024-02-05 RX ORDER — IBUPROFEN 600 MG/1
1 TABLET ORAL
Status: DISCONTINUED | OUTPATIENT
Start: 2024-02-05 | End: 2024-02-13 | Stop reason: HOSPADM

## 2024-02-05 RX ORDER — AMLODIPINE BESYLATE 10 MG/1
10 TABLET ORAL
Status: DISCONTINUED | OUTPATIENT
Start: 2024-02-05 | End: 2024-02-13 | Stop reason: HOSPADM

## 2024-02-05 RX ORDER — TRAZODONE HYDROCHLORIDE 50 MG/1
25 TABLET ORAL NIGHTLY
Status: DISCONTINUED | OUTPATIENT
Start: 2024-02-05 | End: 2024-02-13 | Stop reason: HOSPADM

## 2024-02-05 RX ORDER — SODIUM CHLORIDE 0.9 % (FLUSH) 0.9 %
10 SYRINGE (ML) INJECTION AS NEEDED
Status: DISCONTINUED | OUTPATIENT
Start: 2024-02-05 | End: 2024-02-13 | Stop reason: HOSPADM

## 2024-02-05 RX ORDER — BISACODYL 5 MG/1
5 TABLET, DELAYED RELEASE ORAL DAILY PRN
Status: DISCONTINUED | OUTPATIENT
Start: 2024-02-05 | End: 2024-02-13 | Stop reason: HOSPADM

## 2024-02-05 RX ORDER — BENZALKONIUM CHLORIDE 120 MG/100ML
SOLUTION TOPICAL
COMMUNITY

## 2024-02-05 RX ORDER — SODIUM CHLORIDE 0.9 % (FLUSH) 0.9 %
10 SYRINGE (ML) INJECTION EVERY 12 HOURS SCHEDULED
Status: DISCONTINUED | OUTPATIENT
Start: 2024-02-05 | End: 2024-02-13 | Stop reason: HOSPADM

## 2024-02-05 RX ORDER — FAMOTIDINE 20 MG/1
20 TABLET, FILM COATED ORAL 2 TIMES DAILY
Status: DISCONTINUED | OUTPATIENT
Start: 2024-02-05 | End: 2024-02-06

## 2024-02-05 RX ORDER — BRIMONIDINE TARTRATE 2 MG/ML
1 SOLUTION/ DROPS OPHTHALMIC 2 TIMES DAILY
Status: DISCONTINUED | OUTPATIENT
Start: 2024-02-05 | End: 2024-02-13 | Stop reason: HOSPADM

## 2024-02-05 RX ORDER — INSULIN LISPRO 100 [IU]/ML
2-7 INJECTION, SOLUTION INTRAVENOUS; SUBCUTANEOUS
Status: DISCONTINUED | OUTPATIENT
Start: 2024-02-05 | End: 2024-02-13 | Stop reason: HOSPADM

## 2024-02-05 RX ORDER — TIMOLOL MALEATE 5 MG/ML
1 SOLUTION/ DROPS OPHTHALMIC 2 TIMES DAILY
Status: DISCONTINUED | OUTPATIENT
Start: 2024-02-05 | End: 2024-02-13 | Stop reason: HOSPADM

## 2024-02-05 RX ORDER — NICOTINE POLACRILEX 4 MG
15 LOZENGE BUCCAL
Status: DISCONTINUED | OUTPATIENT
Start: 2024-02-05 | End: 2024-02-13 | Stop reason: HOSPADM

## 2024-02-05 RX ORDER — BISACODYL 10 MG
10 SUPPOSITORY, RECTAL RECTAL DAILY PRN
Status: DISCONTINUED | OUTPATIENT
Start: 2024-02-05 | End: 2024-02-13 | Stop reason: HOSPADM

## 2024-02-05 RX ORDER — DIMETHICONE 1.5 G/100ML
CREAM TOPICAL
COMMUNITY

## 2024-02-05 RX ORDER — CHOLECALCIFEROL (VITAMIN D3) 125 MCG
5 CAPSULE ORAL NIGHTLY
Status: DISCONTINUED | OUTPATIENT
Start: 2024-02-05 | End: 2024-02-13 | Stop reason: HOSPADM

## 2024-02-05 RX ORDER — HYDRALAZINE HYDROCHLORIDE 50 MG/1
50 TABLET, FILM COATED ORAL 3 TIMES DAILY
Status: DISCONTINUED | OUTPATIENT
Start: 2024-02-05 | End: 2024-02-13 | Stop reason: HOSPADM

## 2024-02-05 RX ORDER — FUROSEMIDE 10 MG/ML
60 INJECTION INTRAMUSCULAR; INTRAVENOUS ONCE
Status: COMPLETED | OUTPATIENT
Start: 2024-02-05 | End: 2024-02-05

## 2024-02-05 RX ADMIN — VANCOMYCIN HYDROCHLORIDE 1750 MG: 10 INJECTION, POWDER, LYOPHILIZED, FOR SOLUTION INTRAVENOUS at 17:39

## 2024-02-05 RX ADMIN — INSULIN LISPRO 3 UNITS: 100 INJECTION, SOLUTION INTRAVENOUS; SUBCUTANEOUS at 21:27

## 2024-02-05 RX ADMIN — INSULIN LISPRO 2 UNITS: 100 INJECTION, SOLUTION INTRAVENOUS; SUBCUTANEOUS at 17:39

## 2024-02-05 RX ADMIN — FUROSEMIDE 60 MG: 10 INJECTION, SOLUTION INTRAMUSCULAR; INTRAVENOUS at 16:37

## 2024-02-05 RX ADMIN — SODIUM CHLORIDE 1500 MG: 9 INJECTION, SOLUTION INTRAVENOUS at 15:23

## 2024-02-05 RX ADMIN — Medication 10 ML: at 21:42

## 2024-02-05 RX ADMIN — IPRATROPIUM BROMIDE AND ALBUTEROL SULFATE 3 ML: 2.5; .5 SOLUTION RESPIRATORY (INHALATION) at 20:22

## 2024-02-05 RX ADMIN — IPRATROPIUM BROMIDE AND ALBUTEROL SULFATE 3 ML: 2.5; .5 SOLUTION RESPIRATORY (INHALATION) at 15:34

## 2024-02-05 RX ADMIN — CEFEPIME 2000 MG: 2 INJECTION, POWDER, FOR SOLUTION INTRAVENOUS at 16:36

## 2024-02-05 NOTE — ED PROVIDER NOTES
Subjective   History of Present Illness  Patient is a 68-year-old male with past medical history of dementia, GERD, hypertension, alcohol and drug abuse, diabetes, currently at [facility] and presents from there via EMS.  EMS report says that patient has had decreased mentation since Friday and that the nursing home called and said that he has not change in mentation since Friday.  EMS states that the nursing home said patient can normally converse but has not been since Friday.  Patient opens his eyes when asked but does not follow any other commands or answer any questions.  Review of Systems  Unable to obtain due to altered mental status  Past Medical History:   Diagnosis Date    Anemia     Dementia     Diabetes mellitus     Dysphagia     GERD (gastroesophageal reflux disease)     History of alcohol abuse     History of cocaine use     History of marijuana use     Hypertension     Osteomyelitis     Poor historian     records obtained from nursing home records & his family    Visual impairment        No Known Allergies    Past Surgical History:   Procedure Laterality Date    AMPUTATION FOOT Left 10/18/2022    Procedure: PARTIAL FIRST RAY AMPUTATION LEFT;  Surgeon: Yeison Petty MD;  Location:  [facility];  Service: Orthopedics;  Laterality: Left;    AMPUTATION FOOT Left 12/5/2022    Procedure: Transmetatarsal of Left Foot;  Surgeon: Yeison Petty MD;  Location:  [facility];  Service: Orthopedics;  Laterality: Left;    EYE SURGERY         Family History   Problem Relation Age of Onset    Diabetes Mother     Hypertension Mother     Hypertension Father     Diabetes Father     Diabetes Sister     Hypertension Brother     Diabetes Brother     Diabetes Maternal Grandmother     Diabetes Maternal Grandfather     Hypertension Brother     Diabetes Brother        Social History     Socioeconomic History    Marital status: Single   Tobacco Use    Smoking status: Former     Packs/day: 1.00     Years: 40.00     Additional pack  "years: 0.00     Total pack years: 40.00     Types: Cigarettes     Quit date: 2017     Years since quittin.7    Smokeless tobacco: Never   Vaping Use    Vaping Use: Never used   Substance and Sexual Activity    Alcohol use: Not Currently     Comment: histgry of alcohol abuse    Drug use: Yes     Types: Marijuana, \"Crack\" cocaine     Comment: PATIENT STATES \"IT'S BEEN A FEW DAYS\"    Sexual activity: Defer           Objective   Physical Exam  Physical Exam  GENERAL:Well developed.  Appears in no acute distress.  Lying flat with eyes closed  HENT: Nares patent.  Dried blood noted on the right side of the mouth along with a cut to his tongue.   EYES: No scleral icterus.  Some tearing noted in the eyes bilaterally.  Pupils equal and reactive.  CV: Regular rhythm, regular rate  RESPIRATORY: Normal effort.  No audible wheezes.  Decreased breath sounds noted diffusely  ABDOMEN: Soft, nontender. No rebound tenderness or guarding noted.  Some ecchymosis noted diffusely of the lower abdomen.  MUSCULOSKELETAL: No deformities.  Blood noted right fingers  NEURO: Arousable to voice but lethargic.  Opens eyes.   SKIN: Warm, dry.     Procedures           ED Course  ED Course as of 24 1617   Mon 2024   1232 Hemoglobin(!): 7.7 [OM]   1241 HS Troponin T(!!): 168 [OM]   1245 Device (Oxygen Therapy): nasal cannula [OM]   1347 proBNP(!): 1,446.0 [OM]   1428 Spoke with neurology, hospitalist already ordered valporate as she was able to get a hold of the neurologist before I was [OM]      ED Course User Index  [OM] Abigail Enriquez PA-C                                   Differential diagnosis includes seizure, postictal, viral illness, stroke, dementia, encephalopathy      68-year-old male with past medical history of dementia presents from nursing home with decreased mentation and altered mental status for the past 2 days.  Per EMS report nursing home says that he has not changed in mentation since Friday.  EMS states " that nursing home said he normally conversing.  Patient is  not verbal in the emergency department.  Patient has an elevated troponin, JAYY, concern for pneumonia versus pulmonary edema on chest x-ray, hyperkalemia, EKG shows concern now for a septal infarct when compared to EKG in January.  Hemoglobin 7.7. CT scan of the head and neck did not show any acute abnormalities.  Valproic acid level done as patient is supposed to be taking this but does not appear so as the level is low.   Spoke with hospitalist who is agreeable to admit the patient to the hospital.  I have paged neuro and waiting to hear back about loading him with seizure medication.   Alerted by nurse that patient has woken up and was answering some questions but then went back to only opening his eyes.  Patient appears to be waxing and waning.  Medical Decision Making  Problems Addressed:  Acute kidney injury: complicated acute illness or injury  Altered mental status, unspecified altered mental status type: complicated acute illness or injury  Elevated troponin: complicated acute illness or injury  Encephalopathy: complicated acute illness or injury    Amount and/or Complexity of Data Reviewed  Labs: ordered. Decision-making details documented in ED Course.  Radiology: ordered.  ECG/medicine tests: ordered.    Risk  Prescription drug management.  Decision regarding hospitalization.        Final diagnoses:   Encephalopathy   Altered mental status, unspecified altered mental status type   Elevated troponin   Acute kidney injury       ED Disposition  ED Disposition       ED Disposition   Decision to Admit    Condition   --    Comment   Level of Care: Telemetry [5]   Diagnosis: Encephalopathy [469119]   Admitting Physician: AR BERNSTEIN [[id number]]   Attending Physician: AR BERNSTEIN [[id number]]                 No follow-up provider specified.       Medication List      No changes were made to your prescriptions during this visit.            [facility],  ISAAC Hayes  02/05/24 1614

## 2024-02-05 NOTE — PLAN OF CARE
Goal Outcome Evaluation:                     Anticipated Discharge Disposition (SLP): skilled nursing facility          SLP Swallowing Diagnosis: oral dysphagia, suspected pharyngeal dysphagia (02/05/24 8562)

## 2024-02-05 NOTE — THERAPY EVALUATION
Acute Care - Speech Language Pathology   Swallow Initial Evaluation [facility]  Clinical Swallow Evaluation       Patient Name: Omkar Nava  : 1955  MRN: [id number]  Today's Date: 2024               Admit Date: 2024    Visit Dx:     ICD-10-CM ICD-9-CM   1. Encephalopathy  G93.40 348.30   2. Altered mental status, unspecified altered mental status type  R41.82 780.97   3. Elevated troponin  R79.89 790.6   4. Acute kidney injury  N17.9 584.9   5. 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, without long-term current use of insulin    Orthostatic hypotension    Acute osteomyelitis of left foot    Type 2 diabetes mellitus    Essential hypertension    Psychotic disorder    Neurocognitive disorder    S/P transmetatarsal amputation of foot, left    AMS (altered mental status)    Hypoxia    Abscess of left foot    Anemia of chronic disease    Aspiration pneumonia    Cellulitis of left toe    Cellulitis of lower extremity    Cervical spine pain    Chronic kidney disease    Closed head injury without loss of consciousness    Dementia    Dental cavities    Diarrhea of presumed infectious origin    Displaced fracture of proximal phalanx of left great toe, initial encounter for open fracture    Dysphagia    Erectile dysfunction    Fall    Gas gangrene    Generalized muscle weakness    Hallucinations    Hypertensive heart and chronic kidney disease 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 failure, with stage 1 through stage 4 chronic kidney disease, or unspecified chronic kidney disease    Insomnia due to medical condition    Iron deficiency anemia    Klebsiella pneumoniae (k. pneumoniae) as the cause of diseases classified elsewhere    Laceration without foreign body, left foot, subsequent encounter    Long term (current) use of insulin     Other acute osteomyelitis, left ankle and foot    Personal history of Methicillin resistant Staphylococcus aureus infection    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    Protein calorie malnutrition    Simple chronic bronchitis    Tobacco use    Type 2 diabetes mellitus with diabetic neuropathy, unspecified    Wound infection, posttraumatic    Type 2 diabetes mellitus with diabetic chronic kidney disease    Encephalopathy    Bilateral pneumonia    Acute respiratory failure with hypoxia     Past Medical History:   Diagnosis Date    Anemia     Dementia     Diabetes mellitus     Dysphagia     GERD (gastroesophageal reflux disease)     History of alcohol abuse     History of cocaine use     History of marijuana use     Hypertension     Osteomyelitis     Poor historian     records obtained from nursing home records & his family    Visual impairment      Past Surgical History:   Procedure Laterality Date    AMPUTATION FOOT Left 10/18/2022    Procedure: PARTIAL FIRST RAY AMPUTATION LEFT;  Surgeon: Yeison Petty MD;  Location:  [facility];  Service: Orthopedics;  Laterality: Left;    AMPUTATION FOOT Left 12/5/2022    Procedure: Transmetatarsal of Left Foot;  Surgeon: Yeison Petty MD;  Location:  [facility];  Service: Orthopedics;  Laterality: Left;    EYE SURGERY         SLP Recommendation and Plan  SLP Swallowing Diagnosis: oral dysphagia, suspected pharyngeal dysphagia (02/05/24 1625)  SLP Diet Recommendation: puree, nectar thick liquids, no mixed consistencies (02/05/24 1625)  Recommended Precautions and Strategies: general aspiration precautions, assist with feeding, other (see comments) (small/single sips) (02/05/24 1625)  SLP Rec. for Method of Medication Administration: meds crushed, with puree, as tolerated (02/05/24 1625)     Monitor for Signs of Aspiration: yes, notify SLP if any concerns (02/05/24 1625)  Recommended Diagnostics: reassess via [facility] ([facility]) (02/05/24 1625)  Swallow Criteria for Skilled Therapeutic Interventions  Met: demonstrates skilled criteria (02/05/24 1625)  Anticipated Discharge Disposition (SLP): skilled nursing facility (02/05/24 1625)  Rehab Potential/Prognosis, Swallowing: good, to achieve stated therapy goals (02/05/24 1625)     Predicted Duration Therapy Intervention (Days): until discharge (02/05/24 1625)  Oral Care Recommendations: Oral Care BID/PRN, Suction toothbrush (02/05/24 1625)  Demonstrates Need for Referral to Another Service: speech therapy, other (see comments) (? [facility] eval) (02/05/24 1625)                                            SWALLOW EVALUATION (last 72 hours)       SLP Adult Swallow Evaluation       Row Name 02/05/24 1625                   Rehab Evaluation    Document Type evaluation  -        Subjective Information no complaints  -        Patient Observations cooperative;poorly cooperative  -        Patient/Family/Caregiver Comments/Observations Family present  -        Patient Effort good  -        Symptoms Noted During/After Treatment none  -           General Information    Patient Profile Reviewed yes  -        Pertinent History Of Current Problem Adm w/ AMS. Hx: HFrEF, DMII, chronic anemia, CKDIII, HTN, dementia, osteomyelitis. CXR: extensive bilateral airspace disease, suggestive of edema vs multifocal infiltrates. Head CT negative for acute intracranial abnormalities  -        Current Method of Nutrition NPO  -        Precautions/Limitations, Vision difficult to assess  -        Precautions/Limitations, Hearing WFL;for purposes of eval  -        Prior Level of Function-Communication other (see comments)  dementia per chart  -        Prior Level of Function-Swallowing other (see comments)  CSE completed August '23 w/ recs for soft/whole solid diet w/ thin liquids. Pt returned to regular diet w/ thin liquids following d/c per family report  -        Plans/Goals Discussed with patient;family;agreed upon  -        Barriers to Rehab medically complex;cognitive  status  -        Patient's Goals for Discharge patient did not state  -        Family Goals for Discharge family did not state  -           Pain    Additional Documentation Pain Scale: FACES Pre/Post-Treatment (Group)  -           Pain Scale: FACES Pre/Post-Treatment    Pain: FACES Scale, Pretreatment 0-->no hurt  -        Posttreatment Pain Rating 0-->no hurt  -           Oral Motor Structure and Function    Dentition Assessment missing teeth  -        Secretion Management WNL/WFL  -        Mucosal Quality dry  -           Oral Musculature and Cranial Nerve Assessment    Oral Motor General Assessment generalized oral motor weakness  -           General Eating/Swallowing Observations    Respiratory Support Currently in Use nasal cannula  -        Eating/Swallowing Skills self-fed;fed by SLP  -        Positioning During Eating upright in bed  -        Utensils Used spoon;cup;straw  -        Consistencies Trialed ice chips;thin liquids;nectar/syrup-thick liquids;pureed  -           Clinical Swallow Eval    Oral Prep Phase impaired  -        Oral Transit impaired  -        Pharyngeal Phase suspected pharyngeal impairment  -        Clinical Swallow Evaluation Summary Pt w/ overt s/s of aspiration c/b intermittent throat clear w/ thin liquid trials regardless of presentation method. No overt s/s of aspiration w/ NTL or pureed trials. Oral phase generally weak appearing. Pt noted w/ anterior loss & increased oral transit time. Regular solid trials deferred. Recommend pureed diet w/ NTL, no mixed consistencies, assistance w/ feeding, small/single sips. Meds crushed as able in pureed. Plan [place] [facility] [place]  -           Oral Prep Concerns    Oral Prep Concerns anterior loss  -        Anterior Loss thin  -           Oral Transit Concerns    Oral Transit Concerns delayed initiation of bolus transit  -        Delayed Intiation of Bolus Transit thin;nectar  -           Pharyngeal Phase  Concerns    Pharyngeal Phase Concerns throat clear  -        Throat Clear thin  -           SLP Evaluation Clinical Impression    SLP Swallowing Diagnosis oral dysphagia;suspected pharyngeal dysphagia  -        Functional Impact risk of aspiration/pneumonia  -        Rehab Potential/Prognosis, Swallowing good, to achieve stated therapy goals  -        Swallow Criteria for Skilled Therapeutic Interventions Met demonstrates skilled criteria  -           Recommendations    Predicted Duration Therapy Intervention (Days) until discharge  -        SLP Diet Recommendation puree;nectar thick liquids;no mixed consistencies  -        Recommended Diagnostics [place]  -        Recommended Precautions and Strategies general aspiration precautions;assist with feeding;other (see comments)  small/single sips  -        Oral Care Recommendations Oral Care BID/PRN;Suction toothbrush  -        SLP Rec. for Method of Medication Administration meds crushed;with puree;as tolerated  -        Monitor for Signs of Aspiration yes;notify SLP if any concerns  -        Anticipated Discharge Disposition (SLP) skilled nursing facility  -        Demonstrates Need for Referral to Another Service speech therapy;other (see comments)  ? [facility] [place]  -                  User Key  (r) = Recorded By, (t) = Taken By, (c) = Cosigned By      Initials Name Effective Dates     Kathy Gonzales, MS [facility]-SLP 05/12/23 -                     EDUCATION  The patient has been educated in the following areas:   Dysphagia (Swallowing Impairment) Modified Diet Instruction.              Time Calculation:    Time Calculation- SLP       Row Name 02/05/24 2821             Time Calculation- [facility]    SLP Start Time 1625  -      SLP Received On 02/05/24  -         Untimed Charges    [id number] Eval Oral Pharyng Swallow Minutes 65  -         Total Minutes    Untimed Charges Total Minutes 65  -       Total Minutes 65  -                 User Key  (r) = Recorded By, (t) = Taken By, (c) = Cosigned By      Initials Name Provider Type     Kathy Gonzales, MS CCC-SLP Speech and Language Pathologist                    Therapy Charges for Today       Code Description Service Date Service Provider Modifiers Qty    [id number] HC ST EVAL ORAL PHARYNG SWALLOW 4 2/5/2024 Kathy Gonzales, MS RUIZ-SLP GN 1                 Kathy Gonzales MS CCC-JOVANA  2/5/2024

## 2024-02-05 NOTE — LETTER
EMS Transport Request  For use at [facility], [place], [place], [place], and [place] only   Patient Name: Omkar Nava : 1955   Weight:85.1 kg (187 lb 9.8 oz) Pick-up Location: [facility] BLS/ALS: BLS/ALS: BLS   Insurance: MEDICARE Auth End Date: NA   Pre-Cert #: D/C Summary complete:    Destination: Other [facility]   Contact Precautions: None   Equipment (O2, Fluids, etc.): None   Arrive By Date/Time:  late morning/early afternon Stretcher/WC: Stretcher   CM Requesting: Brinda Salvador, MSW Ext: [phone]   Notes/Medical Necessity: Dementia     ______________________________________________________________________    *Only 2 patient bags OR 1 carry-on size bag are permitted.  Wheelchairs and walkers CANNOT transported with the patient. Acknowledge: Yes

## 2024-02-05 NOTE — ED NOTES
" Omkar Nava    Nursing Report ED to Floor:  Mental status: AO2  Ambulatory status: X2  Oxygen Therapy:  2L NC  Cardiac Rhythm: NSR  Admitted from: ED  Safety Concerns:  FALL RISK, HX OF DEMENTIA  Social Issues: NONE  ED Room #:  6    ED Nurse Phone Extension - [phone] or may call [phone].      HPI:   Chief Complaint   Patient presents with    Altered Mental Status       Past Medical History:  Past Medical History:   Diagnosis Date    Anemia     Dementia     Diabetes mellitus     Dysphagia     GERD (gastroesophageal reflux disease)     History of alcohol abuse     History of cocaine use     History of marijuana use     Hypertension     Osteomyelitis     Poor historian     records obtained from nursing home records & his family    Visual impairment         Past Surgical History:  Past Surgical History:   Procedure Laterality Date    AMPUTATION FOOT Left 10/18/2022    Procedure: PARTIAL FIRST RAY AMPUTATION LEFT;  Surgeon: Yeison Petty MD;  Location:  [facility];  Service: Orthopedics;  Laterality: Left;    AMPUTATION FOOT Left 12/5/2022    Procedure: Transmetatarsal of Left Foot;  Surgeon: Yeison Petty MD;  Location:  [facility];  Service: Orthopedics;  Laterality: Left;    EYE SURGERY          Admitting Doctor:   Sarah Gann DO    Consulting Provider(s):  Consults       No orders found from 1/7/2024 to 2/6/2024.             Admitting Diagnosis:   The primary encounter diagnosis was Encephalopathy. Diagnoses of Altered mental status, unspecified altered mental status type, Elevated troponin, and Acute kidney injury were also pertinent to this visit.    Most Recent Vitals:   Vitals:    02/05/24 1215 02/05/24 1230 02/05/24 1239 02/05/24 1245   BP: 166/77 165/77  170/98   BP Location:       Patient Position:       Pulse: 68 66  67   Resp:       Temp:       TempSrc:       SpO2: 91% 90%  92%   Weight:   85.1 kg (187 lb 9.8 oz)    Height:   152 cm (59.84\")        Active LDAs/IV Access:   Lines, Drains & Airways       " Active LDAs       Name Placement date Placement time Site Days    Peripheral IV 01/16/24 0230 Distal;Posterior;Right Forearm 01/16/24  0230  Forearm  20    Peripheral IV 02/05/24 Anterior;Distal;Left;Upper Arm 02/05/24  --  Arm  less than 1    Peripheral IV 02/05/24 1200 Right Antecubital 02/05/24  1200  Antecubital  less than 1                    Labs (abnormal labs have a star):   Labs Reviewed   COMPREHENSIVE METABOLIC PANEL - Abnormal; Notable for the following components:       Result Value    Glucose 125 (*)     BUN 30 (*)     Creatinine 1.80 (*)     Potassium 5.4 (*)     eGFR 41.0 (*)     All other components within normal limits    Narrative:     GFR Normal >60  Chronic Kidney Disease <60  Kidney Failure <15     SINGLE HSTROPONIN T - Abnormal; Notable for the following components:    HS Troponin T 168 (*)     All other components within normal limits    Narrative:     High Sensitive Troponin T Reference Range:  <14.0 ng/L- Negative Female for AMI  <22.0 ng/L- Negative Male for AMI  >=14 - Abnormal Female indicating possible myocardial injury.  >=22 - Abnormal Male indicating possible myocardial injury.   Clinicians would have to utilize clinical acumen, EKG, Troponin, and serial changes to determine if it is an Acute Myocardial Infarction or myocardial injury due to an underlying chronic condition.        CBC WITH AUTO DIFFERENTIAL - Abnormal; Notable for the following components:    RBC 2.61 (*)     Hemoglobin 7.7 (*)     Hematocrit 25.0 (*)     MCHC 30.8 (*)     RDW-SD 54.2 (*)     All other components within normal limits   VALPROIC ACID LEVEL, TOTAL - Abnormal; Notable for the following components:    Valproic Acid 3.0 (*)     All other components within normal limits    Narrative:     Therapeutic Ranges for Valproic Acid    Epilepsy:       mcg/ml  Bipolar/[place]  up to 125 mcg/ml     BNP (IN-HOUSE) - Abnormal; Notable for the following components:    proBNP 1,446.0 (*)     All other components  within normal limits    Narrative:     This assay is used as an aid in the diagnosis of individuals suspected of having heart failure. It can be used as an aid in the diagnosis of acute decompensated heart failure (ADHF) in patients presenting with signs and symptoms of ADHF to the emergency department (ED). In addition, NT-proBNP of <300 pg/mL indicates ADHF is not likely.    Age Range Result Interpretation  NT-proBNP Concentration (pg/mL:      <50             Positive            >450                   Gray                 300-450                    Negative             <300    50-75           Positive            >900                  Gray                300-900                  Negative            <300      >75             Positive            >1800                  Gray                300-1800                  Negative            <300   MAGNESIUM - Normal   URINALYSIS W/ MICROSCOPIC IF INDICATED (NO CULTURE) - Normal    Narrative:     Urine microscopic not indicated.   URINE DRUG SCREEN - Normal    Narrative:     Cutoff For Drugs Screened:    Amphetamines               500 ng/ml  Barbiturates               200 ng/ml  Benzodiazepines            150 ng/ml  Cocaine                    150 ng/ml  Methadone                  200 ng/ml  Opiates                    100 ng/ml  Phencyclidine               25 ng/ml  THC                         50 ng/ml  Methamphetamine            500 ng/ml  Tricyclic Antidepressants  300 ng/ml  Oxycodone                  100 ng/ml  Buprenorphine               10 ng/ml    The normal value for all drugs tested is negative. This report includes unconfirmed screening results, with the cutoff values listed, to be used for medical treatment purposes only.  Unconfirmed results must not be used for non-medical purposes such as employment or legal testing.  Clinical consideration should be applied to any drug of abuse test, particularly when unconfirmed results are used.     FENTANYL, URINE - Normal     "Narrative:     Negative Threshold:      Fentanyl 5 ng/mL     The normal value for the drug tested is negative. This report includes final unconfirmed screening results to be used for medical treatment purposes only. Unconfirmed results must not be used for non-medical purposes such as employment or legal testing. Clinical consideration should be applied to any drug of abuse test, particularly when unconfirmed results are used.          PROCALCITONIN - Normal    Narrative:     As a Marker for Sepsis (Non-Neonates):    1. <0.5 ng/mL represents a low risk of severe sepsis and/or septic shock.  2. >2 ng/mL represents a high risk of severe sepsis and/or septic shock.    As a Marker for Lower Respiratory Tract Infections that require antibiotic therapy:    PCT on Admission    Antibiotic Therapy       6-12 Hrs later    >0.5                Strongly Recommended  >0.25 - <0.5        Recommended   0.1 - 0.25          Discouraged              Remeasure/reassess PCT  <0.1                Strongly Discouraged     Remeasure/reassess PCT    As 28 day mortality risk marker: \"Change in Procalcitonin Result\" (>80% or <=80%) if Day 0 (or Day 1) and Day 4 values are available. Refer to http://www.[employer]-pct-calculator.com    Change in PCT <=80%  A decrease of PCT levels below or equal to 80% defines a positive change in PCT test result representing a higher risk for 28-day all-cause mortality of patients diagnosed with severe sepsis for septic shock.    Change in PCT >80%  A decrease of PCT levels of more than 80% defines a negative change in PCT result representing a lower risk for 28-day all-cause mortality of patients diagnosed with severe sepsis or septic shock.      LACTIC ACID, PLASMA - Normal   CK - Normal   COVID PRE-OP / PRE-PROCEDURE SCREENING ORDER (NO ISOLATION)    Narrative:     The following orders were created for panel order COVID PRE-OP / PRE-PROCEDURE SCREENING ORDER (NO ISOLATION) - Swab, Nasopharynx.  Procedure        "                        Abnormality         Status                     ---------                               -----------         ------                     COVID-19 and FLU A/B PCR...[[id number]]                      In process                   Please view results for these tests on the individual orders.   COVID-19 AND FLU A/B, NP SWAB IN TRANSPORT MEDIA 1 HR TAT   BLOOD CULTURE   BLOOD CULTURE   RAINBOW DRAW    Narrative:     The following orders were created for panel order [place] Draw.  Procedure                               Abnormality         Status                     ---------                               -----------         ------                     Green Top (Gel)[[id number]]                                  Final result               Lavender Top[[id number]]                                     Final result               Gold Top - SST[[id number]]                                   Final result               Gray Top[[id number]]                                         In process                 Light Blue Top[[id number]]                                   Final result                 Please view results for these tests on the individual orders.   HIGH SENSITIVITIY TROPONIN T 2HR   BNP (IN-HOUSE)   LACTIC ACID, PLASMA   CBC AND DIFFERENTIAL    Narrative:     The following orders were created for panel order CBC & Differential.  Procedure                               Abnormality         Status                     ---------                               -----------         ------                     CBC Auto Differential[[id number]]        Abnormal            Final result                 Please view results for these tests on the individual orders.   GREEN TOP   LAVENDER TOP   GOLD TOP - SST   LIGHT BLUE TOP   GRAY TOP       Meds Given in ED:   Medications   sodium chloride 0.9 % flush 10 mL (has no administration in time range)   valproate (DEPACON) 1,500 mg in sodium chloride 0.9 % 100 mL IVPB (has no  administration in time range)

## 2024-02-05 NOTE — H&P
[facility] Medicine Services  HISTORY AND PHYSICAL    Patient Name: Omkar Nava  : 1955  MRN: [id number]  Primary Care Physician: Deann Cao MD  Date of admission: 2024      Subjective   Subjective     Chief Complaint:  AMS    HPI:  Omkar Nava is a 68 y.o. male with PMHx significant for HFrEF, DMII, chronic anemia, CKDIII, HTN, Hx of Osteomyelitis s/p transmetatarsal amputation, dementia, HTN current resident of [facility]. Reportedly has been altered since Friday. He is normally able to state his name/birthdate and follow simple commands, currently mostly obtunded and unable to answer any questions. Sister at bedside and daughter on the phone state he has been declining since Friday. He does take Valproic acid but has no hx of prior seizures except when he was a child. Family notes no recent seizure activity. Daughter also notes that he has been requiring oxygen at the facility and been more short of breath. She was worried that he may have been developing pneumonia again.      Personal History     Past Medical History:   Diagnosis Date    Anemia     Dementia     Diabetes mellitus     Dysphagia     GERD (gastroesophageal reflux disease)     History of alcohol abuse     History of cocaine use     History of marijuana use     Hypertension     Osteomyelitis     Poor historian     records obtained from nursing home records & his family    Visual impairment            Past Surgical History:   Procedure Laterality Date    AMPUTATION FOOT Left 10/18/2022    Procedure: PARTIAL FIRST RAY AMPUTATION LEFT;  Surgeon: Yeison Petty MD;  Location: [facility];  Service: Orthopedics;  Laterality: Left;    AMPUTATION FOOT Left 2022    Procedure: Transmetatarsal of Left Foot;  Surgeon: Yeison Petty MD;  Location: [facility] OR;  Service: Orthopedics;  Laterality: Left;    EYE SURGERY         Family History: family history includes Diabetes in his brother, brother,  "father, maternal grandfather, maternal grandmother, mother, and sister; Hypertension in his brother, brother, father, and mother.     Social History:  reports that he quit smoking about 6 years ago. His smoking use included cigarettes. He has a 40.00 pack-year smoking history. He has never used smokeless tobacco. He reports that he does not currently use alcohol. He reports current drug use. Drugs: Marijuana and \"Crack\" cocaine.  Social History     Social History Narrative    Caffeine 0-1 servings per day    Patient lives at home .       Medications:  Available home medication information reviewed.  Cholecalciferol, FLUoxetine, Insulin Lispro, acetaminophen, albuterol sulfate HFA, amLODIPine, atorvastatin, brimonidine-timolol, carvedilol, cloNIDine, divalproex, docusate sodium, famotidine, ferrous sulfate, hydrALAZINE, hydrOXYzine, melatonin, thiamine, torsemide, and traZODone    No Known Allergies    Objective   Objective     Vital Signs:   Temp:  [99.3 °F (37.4 °C)] 99.3 °F (37.4 °C)  Heart Rate:  [66-72] 67  Resp:  [16] 16  BP: (161-183)/(73-98) 170/98  Flow (L/min):  [2] 2       Physical Exam   Constitutional: sleeping in bed  Eyes: PERRLA, sclerae anicteric, no conjunctival injection  HENT: NCAT, mucous membranes very dry, blood around mouth, appears to have bitten  Neck: Supple, no thyromegaly, no lymphadenopathy, trachea midline  Respiratory: Clear to auscultation bilaterally, nonlabored respirations   Cardiovascular: RRR, no murmurs, rubs, or gallops, palpable pedal pulses bilaterally  Gastrointestinal: Positive bowel sounds, soft, nontender, nondistended  Musculoskeletal: mild bilateral ankle edema, no clubbing or cyanosis to extremities, L TM amputation  Psychiatric: drowsy  Neurologic: drowsy does open eyes to name, unable to assess further due to drowsiness and confusion.  Skin: No rashes      Result Review:  I have personally reviewed the results from the time of this admission to 2/5/2024 13:28 EST and " agree with these findings:  [x]  Laboratory list / accordion  [x]  Microbiology  [x]  Radiology  []  EKG/Telemetry   [x]  Cardiology/Vascular   [x]  Pathology  [x]  Old records  []  Other:  Most notable findings include:       LAB RESULTS:      Lab 02/05/24  1146   WBC 7.84   HEMOGLOBIN 7.7*   HEMATOCRIT 25.0*   PLATELETS 243   NEUTROS ABS 5.19   IMMATURE GRANS (ABS) 0.02   LYMPHS ABS 1.74   MONOS ABS 0.79   EOS ABS 0.09   MCV 95.8         Lab 02/05/24  1146   SODIUM 142   POTASSIUM 5.4*   CHLORIDE 107   CO2 27.0   ANION GAP 8.0   BUN 30*   CREATININE 1.80*   EGFR 41.0*   GLUCOSE 125*   CALCIUM 9.9   MAGNESIUM 2.1         Lab 02/05/24  1146   TOTAL PROTEIN 7.0   ALBUMIN 3.5   GLOBULIN 3.5   ALT (SGPT) 33   AST (SGOT) 21   BILIRUBIN 0.3   ALK PHOS 89         Lab 02/05/24  1146   HSTROP T 168*                 UA          7/31/2023    12:30 8/2/2023    13:19 2/5/2024    11:46   Urinalysis   Squamous Epithelial Cells, UA 0-2  None Seen     Specific Simms, UA 1.015  1.020  1.010    Ketones, UA Negative  Negative  Negative    Blood, UA Small (1+)  Negative  Negative    Leukocytes, UA Negative  Negative  Negative    Nitrite, UA Negative  Negative  Negative    RBC, UA Too Numerous to Count  3-6     WBC, UA 0-2  0-2     Bacteria, UA None Seen  None Seen         Microbiology Results (last 10 days)       ** No results found for the last 240 hours. **            XR Chest 1 View    Result Date: 2/5/2024  XR CHEST 1 VW Date of Exam: 2/5/2024 12:05 PM EST Indication: Weak/Dizzy/AMS triage protocol Comparison: 1/15/2024 Findings: Cardiac silhouette is magnified. Extensive bilateral airspace disease may represent edema or multifocal infiltrates. No significant pleural effusion or pneumothorax. No acute osseous lesion is seen.     Impression: Impression: Extensive bilateral airspace disease may represent edema or multifocal infiltrates. Electronically Signed: Ryan Perales MD  2/5/2024 12:21 PM EST  Workstation ID: [id number]    CT  Cervical Spine Without Contrast    Result Date: 2/5/2024  CT CERVICAL SPINE WO CONTRAST Date of Exam: 2/5/2024 11:50 AM EST Indication: Bone mass or bone pain, cervical spine, aggressive features on xray. Comparison: None available. Technique: Axial CT images were obtained of the cervical spine without contrast administration.  Reconstructed coronal and sagittal images were also obtained. Automated exposure control and iterative construction methods were used. FINDINGS: Examination is somewhat limited due to motion artifact. No acute fracture or subluxation is identified. Mild cervical spondylosis is present. Alignment is otherwise unremarkable. Vertebral body heights are maintained. The craniocervical and atlantoaxial junctions appear within normal limits. The prevertebral and paraspinal soft tissues are without focal abnormality. Carotid calcifications are present. Please refer to the accompanying head CT for description of intracranial findings.     Impression: 1.Examination is somewhat limited due to motion artifact. 2.No acute fracture or subluxation identified within the cervical spine. 3.Mild cervical spondylosis. Electronically Signed: Ryan Perales MD  2/5/2024 12:10 PM EST  Workstation ID: [id number]    CT Head Without Contrast    Result Date: 2/5/2024  CT HEAD WO CONTRAST Date of Exam: 2/5/2024 11:50 AM EST Indication: Mental status change, unknown cause. Comparison: Head CT dated 1/20/2024 Technique: Axial CT images were obtained of the head without contrast administration.  Automated exposure control and iterative construction methods were used. FINDINGS:  Foci of periventricular and subcortical white matter hypoattenuation are consistent with chronic, microvascular ischemic change. There is age-related loss of brain parenchymal volume with prominence of sulcation and ventriculomegaly. No significant mass effect, midline shift, intracranial hemorrhage, or hydrocephalus is identified. No extra-axial  fluid collection is identified.   The calvarium and overlying soft tissues are unremarkable. Mild scattered paranasal sinus mucosal thickening. Surgical changes of the right globe. Orbital structures demonstrate no acute abnormality.     Impression: 1.No acute intracranial abnormality is identified. 2.Findings compatible with chronic microvascular ischemic change and diffuse cortical atrophy. 3.Mild scattered paranasal sinus mucosal thickening. Electronically Signed: Ryan Perales MD  2/5/2024 12:06 PM EST  Workstation ID: [id number]     Results for orders placed during the hospital encounter of 01/15/24    Adult Transthoracic Echo Complete With Contrast if Necessary Per Protocol    Interpretation Summary    Left ventricular ejection fraction appears to be 56 - 60%.    Left ventricular wall thickness is consistent with hypertrophy.    Estimated right ventricular systolic pressure from tricuspid regurgitation is mildly elevated (35-45 mmHg).    There is a small (1-2cm) pericardial effusion.    No evidence for pericardial tamponade      Assessment & Plan   Assessment & Plan       Encephalopathy    Type 2 diabetes mellitus    Essential hypertension    Anemia of chronic disease    Chronic kidney disease    Dementia    Bilateral pneumonia    Acute respiratory failure with hypoxia    Omkar Nava is a 68 y.o. male with PMHx significant for HFrEF, DMII, chronic anemia, CKDIII, HTN, Hx of Osteomyelitis s/p transmetatarsal amputation, dementia, HTN current resident of [facility] who presented with AMS and hypoxia.    AMS:  - concern for seizure given tongue laceration, no prior hx of seizures, does take Depakote, however family states it is not for seizures  - CT head negative  - consult neurology, will get EEG if he will tolerate it  - check CK, lactic acid  - will load w/depakote 1.5g IV x 1, d/w Neurology    Acute on Chronic Hypoxic Respiratory Failure  Multifocal PNA  Possible Exacerbation HFpEF  - currently on 3L,  78% on room air on admission  - recent admission for pneumonia w/positive MRSA PCR, discharged on linezolid 1/21/24  - start Vanc/Cefepime, will get CT chest to better clarify as this is his second admission for pneumonia in 2 months. Repeat MRSA PCR, check resp PCR, strep/legionella  - check procal, lactate, BNP  - SLP evaluation, will keep NPO except meds for now  - give one time dose of lasix IV, can possible resume home torsemide in the AM  - Echo done on 1/16/25 with EF 56-60%    Elevated Creatinine on CKDIII  - baseline around 1.3, currently 1.8  - avoid nephrotoxins, repeat in AM    Elevated Troponin  - EKG appears unchanged from prior, suspect due to above, although he is unable to say if he has chest pain or not  - will trend    HTN:  - continue coreg, norvasc, hydralazine    DMII:  - SSI coverage for now    Hx of Dementia:  - continue home regimen  - high risk for hospital delirium     Updated his sister and daughter in the ED    DVT prophylaxis:  Mechanical DVT prophylaxis orders are signed and held.        CODE STATUS:    Code Status and Medical Interventions:   Ordered at: 02/05/24 1326     Level Of Support Discussed With:    Health Care Surrogate     Code Status (Patient has no pulse and is not breathing):    CPR (Attempt to Resuscitate)     Medical Interventions (Patient has pulse or is breathing):    Full Support       Expected Discharge   Expected discharge date/ time has not been documented.     Sarah Gann,   02/05/24

## 2024-02-05 NOTE — Clinical Note
Level of Care: Telemetry [5]   Diagnosis: Encephalopathy [[id number]]   Admitting Physician: AR BERNSTEIN [[id number]]   Attending Physician: AR BERNSTEIN [[id number]]   Certification: I Certify That Inpatient Hospital Services Are Medically Necessary For Greater Than 2 Midnights

## 2024-02-06 ENCOUNTER — APPOINTMENT (OUTPATIENT)
Dept: GENERAL RADIOLOGY | Facility: HOSPITAL | Age: 69
End: 2024-02-06
Payer: MEDICARE

## 2024-02-06 LAB
ANION GAP SERPL CALCULATED.3IONS-SCNC: 11 MMOL/L (ref 5–15)
BASOPHILS # BLD AUTO: 0.02 10*3/MM3 (ref 0–0.2)
BASOPHILS NFR BLD AUTO: 0.3 % (ref 0–1.5)
BUN SERPL-MCNC: 26 MG/DL (ref 8–23)
BUN/CREAT SERPL: 18.2 (ref 7–25)
CALCIUM SPEC-SCNC: 8.9 MG/DL (ref 8.6–10.5)
CHLORIDE SERPL-SCNC: 104 MMOL/L (ref 98–107)
CO2 SERPL-SCNC: 26 MMOL/L (ref 22–29)
CREAT SERPL-MCNC: 1.43 MG/DL (ref 0.76–1.27)
DEPRECATED RDW RBC AUTO: 50.4 FL (ref 37–54)
EGFRCR SERPLBLD CKD-EPI 2021: 54 ML/MIN/1.73
EOSINOPHIL # BLD AUTO: 0.21 10*3/MM3 (ref 0–0.4)
EOSINOPHIL NFR BLD AUTO: 3.3 % (ref 0.3–6.2)
ERYTHROCYTE [DISTWIDTH] IN BLOOD BY AUTOMATED COUNT: 14.7 % (ref 12.3–15.4)
FOLATE SERPL-MCNC: 13.9 NG/ML (ref 4.78–24.2)
GLUCOSE BLDC GLUCOMTR-MCNC: 139 MG/DL (ref 70–130)
GLUCOSE BLDC GLUCOMTR-MCNC: 157 MG/DL (ref 70–130)
GLUCOSE BLDC GLUCOMTR-MCNC: 174 MG/DL (ref 70–130)
GLUCOSE BLDC GLUCOMTR-MCNC: 202 MG/DL (ref 70–130)
GLUCOSE SERPL-MCNC: 185 MG/DL (ref 65–99)
HCT VFR BLD AUTO: 25.6 % (ref 37.5–51)
HGB BLD-MCNC: 8.1 G/DL (ref 13–17.7)
IMM GRANULOCYTES # BLD AUTO: 0.02 10*3/MM3 (ref 0–0.05)
IMM GRANULOCYTES NFR BLD AUTO: 0.3 % (ref 0–0.5)
L PNEUMO1 AG UR QL IA: NEGATIVE
LYMPHOCYTES # BLD AUTO: 1.03 10*3/MM3 (ref 0.7–3.1)
LYMPHOCYTES NFR BLD AUTO: 16.3 % (ref 19.6–45.3)
MCH RBC QN AUTO: 29.5 PG (ref 26.6–33)
MCHC RBC AUTO-ENTMCNC: 31.6 G/DL (ref 31.5–35.7)
MCV RBC AUTO: 93.1 FL (ref 79–97)
MONOCYTES # BLD AUTO: 0.52 10*3/MM3 (ref 0.1–0.9)
MONOCYTES NFR BLD AUTO: 8.3 % (ref 5–12)
MRSA DNA SPEC QL NAA+PROBE: ABNORMAL
NEUTROPHILS NFR BLD AUTO: 4.5 10*3/MM3 (ref 1.7–7)
NEUTROPHILS NFR BLD AUTO: 71.5 % (ref 42.7–76)
NRBC BLD AUTO-RTO: 0 /100 WBC (ref 0–0.2)
PLATELET # BLD AUTO: 214 10*3/MM3 (ref 140–450)
PMV BLD AUTO: 12.1 FL (ref 6–12)
POTASSIUM SERPL-SCNC: 4.9 MMOL/L (ref 3.5–5.2)
RBC # BLD AUTO: 2.75 10*6/MM3 (ref 4.14–5.8)
S PNEUM AG SPEC QL LA: NEGATIVE
SODIUM SERPL-SCNC: 141 MMOL/L (ref 136–145)
VIT B12 BLD-MCNC: 670 PG/ML (ref 211–946)
WBC NRBC COR # BLD AUTO: 6.3 10*3/MM3 (ref 3.4–10.8)

## 2024-02-06 PROCEDURE — 97161 PT EVAL LOW COMPLEX 20 MIN: CPT

## 2024-02-06 PROCEDURE — 94664 DEMO&/EVAL PT USE INHALER: CPT

## 2024-02-06 PROCEDURE — 94799 UNLISTED PULMONARY SVC/PX: CPT

## 2024-02-06 PROCEDURE — 25010000002 CEFEPIME PER 500 MG: Performed by: INTERNAL MEDICINE

## 2024-02-06 PROCEDURE — 85025 COMPLETE CBC W/AUTO DIFF WBC: CPT | Performed by: INTERNAL MEDICINE

## 2024-02-06 PROCEDURE — 99222 1ST HOSP IP/OBS MODERATE 55: CPT

## 2024-02-06 PROCEDURE — 74230 X-RAY XM SWLNG FUNCJ C+: CPT

## 2024-02-06 PROCEDURE — 25010000002 FUROSEMIDE PER 20 MG: Performed by: STUDENT IN AN ORGANIZED HEALTH CARE EDUCATION/TRAINING PROGRAM

## 2024-02-06 PROCEDURE — 99222 1ST HOSP IP/OBS MODERATE 55: CPT | Performed by: INTERNAL MEDICINE

## 2024-02-06 PROCEDURE — 63710000001 INSULIN LISPRO (HUMAN) PER 5 UNITS: Performed by: INTERNAL MEDICINE

## 2024-02-06 PROCEDURE — 92611 MOTION FLUOROSCOPY/SWALLOW: CPT

## 2024-02-06 PROCEDURE — 80048 BASIC METABOLIC PNL TOTAL CA: CPT | Performed by: INTERNAL MEDICINE

## 2024-02-06 PROCEDURE — 82948 REAGENT STRIP/BLOOD GLUCOSE: CPT

## 2024-02-06 RX ORDER — FUROSEMIDE 10 MG/ML
40 INJECTION INTRAMUSCULAR; INTRAVENOUS EVERY 12 HOURS
Qty: 16 ML | Refills: 0 | Status: DISCONTINUED | OUTPATIENT
Start: 2024-02-06 | End: 2024-02-07

## 2024-02-06 RX ORDER — FAMOTIDINE 20 MG/1
20 TABLET, FILM COATED ORAL DAILY
Status: DISCONTINUED | OUTPATIENT
Start: 2024-02-06 | End: 2024-02-13 | Stop reason: HOSPADM

## 2024-02-06 RX ADMIN — FERROUS SULFATE TAB 325 MG (65 MG ELEMENTAL FE) 325 MG: 325 (65 FE) TAB at 10:02

## 2024-02-06 RX ADMIN — CARVEDILOL 25 MG: 12.5 TABLET, FILM COATED ORAL at 18:14

## 2024-02-06 RX ADMIN — HYDRALAZINE HYDROCHLORIDE 50 MG: 50 TABLET, FILM COATED ORAL at 19:56

## 2024-02-06 RX ADMIN — THIAMINE HCL TAB 100 MG 100 MG: 100 TAB at 10:01

## 2024-02-06 RX ADMIN — FLUOXETINE HYDROCHLORIDE 20 MG: 20 CAPSULE ORAL at 10:01

## 2024-02-06 RX ADMIN — AMLODIPINE BESYLATE 10 MG: 10 TABLET ORAL at 10:02

## 2024-02-06 RX ADMIN — SENNOSIDES AND DOCUSATE SODIUM 2 TABLET: 8.6; 5 TABLET ORAL at 09:59

## 2024-02-06 RX ADMIN — HYDRALAZINE HYDROCHLORIDE 50 MG: 50 TABLET, FILM COATED ORAL at 10:02

## 2024-02-06 RX ADMIN — TIMOLOL MALEATE 1 DROP: 5 SOLUTION/ DROPS OPHTHALMIC at 20:13

## 2024-02-06 RX ADMIN — TIMOLOL MALEATE 1 DROP: 5 SOLUTION/ DROPS OPHTHALMIC at 09:59

## 2024-02-06 RX ADMIN — FUROSEMIDE 40 MG: 10 INJECTION, SOLUTION INTRAMUSCULAR; INTRAVENOUS at 14:59

## 2024-02-06 RX ADMIN — BARIUM SULFATE 20 ML: 400 PASTE ORAL at 11:07

## 2024-02-06 RX ADMIN — HYDROXYZINE HYDROCHLORIDE 50 MG: 25 TABLET, FILM COATED ORAL at 18:14

## 2024-02-06 RX ADMIN — CEFEPIME 2000 MG: 2 INJECTION, POWDER, FOR SOLUTION INTRAVENOUS at 04:32

## 2024-02-06 RX ADMIN — FLUOXETINE HYDROCHLORIDE 20 MG: 20 CAPSULE ORAL at 19:56

## 2024-02-06 RX ADMIN — FAMOTIDINE 20 MG: 20 TABLET, FILM COATED ORAL at 10:02

## 2024-02-06 RX ADMIN — BARIUM SULFATE 100 ML: 0.81 POWDER, FOR SUSPENSION ORAL at 11:07

## 2024-02-06 RX ADMIN — IPRATROPIUM BROMIDE AND ALBUTEROL SULFATE 3 ML: 2.5; .5 SOLUTION RESPIRATORY (INHALATION) at 13:01

## 2024-02-06 RX ADMIN — Medication 10 ML: at 10:03

## 2024-02-06 RX ADMIN — Medication 5 MG: at 19:55

## 2024-02-06 RX ADMIN — TRAZODONE HYDROCHLORIDE 25 MG: 50 TABLET ORAL at 19:56

## 2024-02-06 RX ADMIN — IPRATROPIUM BROMIDE AND ALBUTEROL SULFATE 3 ML: 2.5; .5 SOLUTION RESPIRATORY (INHALATION) at 16:49

## 2024-02-06 RX ADMIN — CARVEDILOL 25 MG: 12.5 TABLET, FILM COATED ORAL at 09:59

## 2024-02-06 RX ADMIN — IPRATROPIUM BROMIDE AND ALBUTEROL SULFATE 3 ML: 2.5; .5 SOLUTION RESPIRATORY (INHALATION) at 07:42

## 2024-02-06 RX ADMIN — BRIMONIDINE TARTRATE 1 DROP: 2 SOLUTION OPHTHALMIC at 20:13

## 2024-02-06 RX ADMIN — DIVALPROEX SODIUM 375 MG: 125 TABLET, DELAYED RELEASE ORAL at 09:58

## 2024-02-06 RX ADMIN — INSULIN LISPRO 3 UNITS: 100 INJECTION, SOLUTION INTRAVENOUS; SUBCUTANEOUS at 11:48

## 2024-02-06 RX ADMIN — ATORVASTATIN CALCIUM 20 MG: 20 TABLET, FILM COATED ORAL at 19:55

## 2024-02-06 RX ADMIN — BRIMONIDINE TARTRATE 1 DROP: 2 SOLUTION OPHTHALMIC at 09:59

## 2024-02-06 NOTE — CONSULTS
[facility]  Consult Note    Patient Name: Omkar Nava  : 1955  MRN: [id number]  Primary Care Physician:  Deann Cao MD  Referring Physician: Lizandro Miranda MD  Date of admission: 2024    Subjective     Reason for Consult/ Chief Complaint: Seizure    Omkar Nava is a 68 y.o. male with a past medical history of HFrEF, T2DM, chronic edema, CKD stage III, HTN, osteomyelitis s/p transmetatarsal amputation, dementia, HTN who presented from his living facility [facility] with complaint of altered mental status.  Per chart review patient has been altered since Friday.  At baseline patient is able to state his name and birthday and follow very simple commands.  Upon arrival to the ED he was obtunded and unable to answer questions.  Patient was currently on Depakote however report over no seizure history except for when he was a child.  Family denies any recent seizure activity.  Upon arrival to the ED patient had tongue laceration.  VPA level on arrival was 3.0.  Patient was loaded with Depakote in the ED.    Review Of Systems   Constitutional: Chronically ill male  Cardiovascular: Negative for chest pain or palpitations.  Respiratory: Negative for shortness of breath or cough.  Gastrointestinal: Negative for nausea and vomiting.  Genitourinary: Negative for bladder incontinence.  Musculoskeletal: Negative for aches and pains in the muscles or joints.  Dermatological: Negative for skin breakdown.   Neurological: Negative for headache, pain, weakness or vision changes.     Personal History     Past Medical History:   Diagnosis Date    Anemia     Dementia     Diabetes mellitus     Dysphagia     GERD (gastroesophageal reflux disease)     History of alcohol abuse     History of cocaine use     History of marijuana use     Hypertension     Osteomyelitis     Poor historian     records obtained from nursing home records & his family    Visual impairment        Past Surgical History:  "  Procedure Laterality Date    AMPUTATION FOOT Left 10/18/2022    Procedure: PARTIAL FIRST RAY AMPUTATION LEFT;  Surgeon: Yeison Petty MD;  Location: formerly [facility] OR;  Service: Orthopedics;  Laterality: Left;    AMPUTATION FOOT Left 12/5/2022    Procedure: Transmetatarsal of Left Foot;  Surgeon: Yeison Petty MD;  Location:  [facility] OR;  Service: Orthopedics;  Laterality: Left;    EYE SURGERY         Family History: family history includes Diabetes in his brother, brother, father, maternal grandfather, maternal grandmother, mother, and sister; Hypertension in his brother, brother, father, and mother. Otherwise pertinent FHx was reviewed and not pertinent to current issue.    Social History:  reports that he quit smoking about 6 years ago. His smoking use included cigarettes. He has a 40.00 pack-year smoking history. He has never used smokeless tobacco. He reports that he does not currently use alcohol. He reports current drug use. Drugs: Marijuana and \"Crack\" cocaine.    Home Medications:   Benzalkonium Chloride, Cholecalciferol, Dimethicone, FLUoxetine, Insulin Lispro, acetaminophen, albuterol sulfate HFA, amLODIPine, atorvastatin, brimonidine-timolol, carvedilol, cloNIDine, dimethicone, divalproex, docusate sodium, famotidine, ferrous sulfate, hydrALAZINE, hydrOXYzine, melatonin, thiamine, torsemide, and traZODone    Current Medications:     Current Facility-Administered Medications:     acetaminophen (TYLENOL) tablet 650 mg, 650 mg, Oral, Q6H PRN, Sarah Gann R, DO    amLODIPine (NORVASC) tablet 10 mg, 10 mg, Oral, Q24H, Sarah Gann, DO    atorvastatin (LIPITOR) tablet 20 mg, 20 mg, Oral, Nightly, Sarah Gann,     sennosides-docusate (PERICOLACE) 8.6-50 MG per tablet 2 tablet, 2 tablet, Oral, BID **AND** polyethylene glycol (MIRALAX) packet 17 g, 17 g, Oral, Daily PRN **AND** bisacodyl (DULCOLAX) EC tablet 5 mg, 5 mg, Oral, Daily PRN **AND** bisacodyl (DULCOLAX) suppository 10 mg, 10 mg, Rectal, " Daily PRN, Sarah Gann DO    brimonidine (ALPHAGAN) 0.2 % ophthalmic solution 1 drop, 1 drop, Both Eyes, BID **AND** timolol (TIMOPTIC) 0.5 % ophthalmic solution 1 drop, 1 drop, Both Eyes, BID, Sarah Gann DO    carvedilol (COREG) tablet 25 mg, 25 mg, Oral, BID With Meals, Sarah Gann DO    dextrose (D50W) (25 g/50 mL) IV injection 25 g, 25 g, Intravenous, Q15 Min PRN, Sarah Gann DO    dextrose (GLUTOSE) oral gel 15 g, 15 g, Oral, Q15 Min PRN, Sarah Gann DO    divalproex (DEPAKOTE) DR tablet 375 mg, 375 mg, Oral, BID, Agnes Dye, NARGIS    famotidine (PEPCID) tablet 20 mg, 20 mg, Oral, BID, Sarah Gann DO    ferrous sulfate tablet 325 mg, 325 mg, Oral, Daily With Breakfast, Sarah Gann DO    FLUoxetine (PROzac) capsule 20 mg, 20 mg, Oral, BID, Sarah Gann DO    glucagon (GLUCAGEN) injection 1 mg, 1 mg, Intramuscular, Q15 Min PRN, Sarah Gann DO    hydrALAZINE (APRESOLINE) tablet 50 mg, 50 mg, Oral, TID, Sarah Gann DO    hydrOXYzine (ATARAX) tablet 50 mg, 50 mg, Oral, Q6H PRN, Sarah Gann DO    Insulin Lispro (humaLOG) injection 2-7 Units, 2-7 Units, Subcutaneous, 4x Daily AC & at Bedtime, Sarah Gann DO, 3 Units at 02/05/24 2127    ipratropium-albuterol (DUO-NEB) nebulizer solution 3 mL, 3 mL, Nebulization, 4x Daily - RT, Sarah Gann DO, 3 mL at 02/06/24 0742    melatonin tablet 5 mg, 5 mg, Oral, Nightly, Sarah Gann DO    ondansetron (ZOFRAN) injection 4 mg, 4 mg, Intravenous, Q6H PRN, Azeem, Sarah R, DO    sodium chloride 0.9 % flush 10 mL, 10 mL, Intravenous, PRN, Sarah Gann R,     sodium chloride 0.9 % flush 10 mL, 10 mL, Intravenous, Q12H, Sarah Gann, , 10 mL at 02/05/24 2142    sodium chloride 0.9 % flush 10 mL, 10 mL, Intravenous, PRN, Sarah Gann R, DO    sodium chloride 0.9 % infusion 40 mL, 40 mL, Intravenous, PRN, Sarah Gann R, DO    thiamine (VITAMIN B-1) tablet 100 mg, 100  mg, Oral, Daily, Sarah Gann R, DO    traZODone (DESYREL) tablet 25 mg, 25 mg, Oral, Nightly, Sarah Gann,      Allergies:  No Known Allergies    Objective     Physical Exam  Vitals and nursing note reviewed.   Constitutional:       General: He is not in acute distress.     Appearance: He is not ill-appearing.   Eyes:      Extraocular Movements: Extraocular movements intact.      Pupils: Pupils are equal, round, and reactive to light.      Comments: No nystagmus or deviated gaze noted   Cardiovascular:      Rate and Rhythm: Normal rate.   Neurological:      Mental Status: He is alert. Mental status is at baseline. He is disoriented.      Cranial Nerves: Cranial nerves 2-12 are intact.      Sensory: Sensation is intact.      Motor: No weakness or seizure activity.      Deep Tendon Reflexes: Babinski sign absent on the right side.      Reflex Scores:       Bicep reflexes are 2+ on the right side and 2+ on the left side.       Patellar reflexes are 0 on the right side and 0 on the left side.     Comments:     Cranial Nerves   CN II: Pupils are equal, round, and reactive to light. Normal visual acuity and visual fields.    CN III IV VI: Extraocular movements are full without nystagmus.  CN V: Normal facial sensation and strength of muscles of mastication.  CN VII: Facial movements are symmetric. No weakness.  CN VIII:  Auditory acuity is normal.  CN IX & X:  Symmetric palatal movement.  CN XI: Sternocleidomastoid and trapezius are normal.  No weakness.  CN XII: The tongue is midline.  No atrophy or fasciculations.    Unable to elicit left Babinski's due to patient's amputation    Patient able to state name and current year.  Disoriented to location           Vitals:  Temp:  [97.8 °F (36.6 °C)-99.3 °F (37.4 °C)] 97.8 °F (36.6 °C)  Heart Rate:  [58-73] 58  Resp:  [16-18] 18  BP: (134-183)/(62-98) 156/84  Flow (L/min):  [2] 2    Laboratory Results:   Lab Results   Component Value Date    GLUCOSE 125 (H)  "02/05/2024    CALCIUM 9.9 02/05/2024     02/05/2024    K 5.4 (H) 02/05/2024    CO2 27.0 02/05/2024     02/05/2024    BUN 30 (H) 02/05/2024    CREATININE 1.80 (H) 02/05/2024    EGFRIFAFRI >60 07/25/2022    EGFRIFNONA >60 07/25/2022    BCR 16.7 02/05/2024    ANIONGAP 8.0 02/05/2024     Lab Results   Component Value Date    WBC 7.84 02/05/2024    HGB 7.7 (L) 02/05/2024    HCT 25.0 (L) 02/05/2024    MCV 95.8 02/05/2024     02/05/2024     No results found for: \"CHOL\"  Lab Results   Component Value Date    HDL 44 06/25/2022     Lab Results   Component Value Date    .4 (H) 06/25/2022     Lab Results   Component Value Date    TRIG 58 06/25/2022     Lab Results   Component Value Date    HGBA1C 7.00 (H) 10/16/2022     Lab Results   Component Value Date    INR 1.08 10/18/2022    INR 1.02 10/14/2022    INR 1.17 (H) 09/29/2022    PROTIME 13.9 10/18/2022    PROTIME 13.3 10/14/2022    PROTIME 14.8 (H) 09/29/2022     Lab Results   Component Value Date    JSKVTIGU02 507 08/05/2023     Lab Results   Component Value Date    FOLATE 8.68 08/05/2023             Assessment / Plan   Brief Patient Summary:  Omkar Nava is a 68 y.o. male with a past medical history of HFrEF, T2DM, chronic edema, CKD stage III, HTN, osteomyelitis s/p transmetatarsal amputation, dementia, HTN who presented from his living facility [facility] with complaint of altered mental status.     Plan:   Altered mental status  Dementia  Questionable history of seizure  EEG showed no focal features or epileptic activity.  Nonspecific mild diffuse cerebral dysfunction.  CT head with no acute intracranial abnormalities.  Did show diffuse cortical atrophy with chronic microvascular changes.  Continue Depakote  mg twice daily, per family this is for behavior and not seizure disorder.  Trough VPA level in a.m.  Continue thiamine 100 mg daily  Vitamin B12 and folate  Continue trazodone 25 mg nightly  Continue fall/delirium " precautions  Continue seizure precautions  If patient has witnessed seizure-like activity load with IV Depakote 1000 mg and increase maintenance dose to 500 mg twice daily.  IV Valium as needed for seizure-like activity  Continue home Aricept 5 mg nightly  Neurochecks every shift  General neurology will continue to follow    I have discussed the above with the patient, bedside RN Dr. De Leon  Time spent with patient: 80 minutes in face-to-face evaluation and management of the patient.       Mila Crews, APRN

## 2024-02-06 NOTE — CONSULTS
Cardiology Consult         Reason for consultation:  HFpEF    Requesting provider: Alexandra De Leon MD  Consulting provider: Killian Mcpherson MD        Active Hospital Problems    Diagnosis  POA    **Encephalopathy [G93.40]  Yes    Bilateral pneumonia [J18.9]  Yes    Acute respiratory failure with hypoxia [J96.01]  Yes    Chronic kidney disease [N18.9]  Yes    Dementia [F03.90]  Yes    Anemia of chronic disease [D63.8]  Yes    Essential hypertension [I10]  Yes    Type 2 diabetes mellitus [E11.9]  Yes              History of present illness: Omkar Nava is a 68-year-old male with above medical history, who is seen today in consultation for HFpEF.  Patient presented from his SNF [facility] to [facility] ED 2/5 with altered mental status since Friday.  On arrival to ED, patient was obtunded and unable to answer questions, with noted tongue laceration concerning for possible seizure activity, which is currently being worked up by neurology.  He is also bleeding treated for multifocal PNA/acute on chronic hypoxic respiratory failure.  He was also recently admitted in January for MRSA pneumonia and discharged on antibiotics 1/21.  Cardiology is consulted, per family request, for possible exacerbation of HFpEF.  Troponin elevated, but downtrending, with elevated proBNP and pulmonary edema on CT currently requiring 2L NC.  He was given IV Lasix 60 mg yesterday, and will receive additional dose of 40 mg today.  He has chronic dementia which is severe.  Worsening confusion over last few days.  Hx per daughter, him and chart.   ROS negative except for the above.        No Known Allergies    Prior to Admission medications    Medication Sig Start Date End Date Taking? Authorizing Provider   acetaminophen (TYLENOL) 325 MG tablet Take 2 tablets by mouth Every 6 (Six) Hours As Needed for Mild Pain, Headache or Fever.   Yes Provider, MD Refugio   albuterol sulfate  (90 Base) MCG/ACT inhaler Inhale 2 puffs Every 4 (Four) Hours  As Needed for Wheezing or Shortness of Air. 1/21/24  Yes Alexandra De Leon MD   amLODIPine (NORVASC) 10 MG tablet Take 1 tablet by mouth Daily. 1/21/24  Yes Alexandra De Leon MD   atorvastatin (LIPITOR) 20 MG tablet Take 1 tablet by mouth Every Night.   Yes Refugio Prince MD   Benzalkonium Chloride (Remedy Antimicrobial Cleanser) 0.12 % liquid Apply to eliza area topically as needed for preventative use for eliza care after each incontinent episode and as needed.   Yes Refugio Prince MD   brimonidine-timolol (COMBIGAN) 0.2-0.5 % ophthalmic solution Administer 1 drop to both eyes 2 (Two) Times a Day.   Yes Refugio Prince MD   carvedilol (COREG) 25 MG tablet Take 1 tablet by mouth 2 (Two) Times a Day With Meals.   Yes Refugio Prince MD   Cholecalciferol 50 MCG (2000 UT) tablet Take 2 tablets by mouth Every Morning.   Yes Refugio Prince MD   cloNIDine (CATAPRES) 0.1 MG tablet Take 1 tablet by mouth Every 6 (Six) Hours As Needed for High Blood Pressure. AS NEEDED FOR SBP GREATER THAN 160   Yes Refugio Prince MD   dimethicone (Remedy Nutrashield) 1 % cream Apply to buttocks/Eliza area topically as needed for preventative Apply topically to buttocks/eliza area.   Yes Refugio Prince MD   Dimethicone (Remedy Skin Repair) 1.5 % cream Apply to body topically as needed for dry skin every shift as needed.   Yes Refugio Prince MD   divalproex (DEPAKOTE) 125 MG DR tablet Take 3 tablets by mouth 2 (Two) Times a Day.   Yes Refugio Prince MD   docusate sodium (COLACE) 100 MG capsule Take 1 capsule by mouth 2 (Two) Times a Day As Needed for Constipation.   Yes Refugio Prince MD   famotidine (PEPCID) 20 MG tablet Take 1 tablet by mouth 2 (Two) Times a Day.   Yes Refugio Prince MD   ferrous sulfate 325 (65 FE) MG tablet Take 1 tablet by mouth Daily With Breakfast.   Yes Refugio Prince MD   FLUoxetine (PROzac) 20 MG capsule Take 1 capsule by mouth 2 (Two) Times a  Day. Resume on 1/25/2024 1/25/24  Yes Alexandra De Leon MD   hydrALAZINE (APRESOLINE) 25 MG tablet Take 2 tablets by mouth 3 (Three) Times a Day. 1/21/24  Yes Alexandra De Leon MD   hydrOXYzine (ATARAX) 50 MG tablet Take 1 tablet by mouth Every 6 (Six) Hours As Needed for Anxiety.   Yes Refugio Prince MD   Insulin Lispro (humaLOG) 100 UNIT/ML injection Inject 2-7 Units under the skin into the appropriate area as directed 4 (Four) Times a Day Before Meals & at Bedtime.  Patient taking differently: 3 (Three) Times a Day Before Meals. Inject per sliding scale: If BS is =0 units                                                    151-200=2 units                                                     201-250=3 units                                                     251-300=4 units                                                    301-350=5 units                                                    351-400=6 units                                                  If BS is greater than 400 give 8 units. If BS is greater than 400 call MD 1/21/24  Yes Alexandra De Leon MD   melatonin 5 MG tablet tablet Take 1 tablet by mouth Every Night. 11/16/22  Yes Rusty Larsen MD   thiamine (VITAMIN B-1) 100 MG tablet  tablet Take 1 tablet by mouth 3 (Three) Times a Day.   Yes ProviderRefugio MD   torsemide (DEMADEX) 10 MG tablet Take 1 tablet by mouth Daily.   Yes ProviderRefugio MD   traZODone (DESYREL) 50 MG tablet Take 0.5 tablets by mouth Every Night. Resume on 1/25/2024  Patient taking differently: Take 1 tablet by mouth Every Night. Resume on 1/25/2024 1/25/24  Yes Alexandra De Leon MD       Past Medical History:   Diagnosis Date    Anemia     Dementia     Diabetes mellitus     Dysphagia     GERD (gastroesophageal reflux disease)     History of alcohol abuse     History of cocaine use     History of marijuana use     Hypertension     Osteomyelitis     Poor historian     records obtained from nursing home records & his  family    Visual impairment        Past Surgical History:   Procedure Laterality Date    AMPUTATION FOOT Left 10/18/2022    Procedure: PARTIAL FIRST RAY AMPUTATION LEFT;  Surgeon: Yeison Petty MD;  Location:  [facility] OR;  Service: Orthopedics;  Laterality: Left;    AMPUTATION FOOT Left 2022    Procedure: Transmetatarsal of Left Foot;  Surgeon: Yeison Petty MD;  Location:  [facility] OR;  Service: Orthopedics;  Laterality: Left;    EYE SURGERY         Family History   Problem Relation Age of Onset    Diabetes Mother     Hypertension Mother     Hypertension Father     Diabetes Father     Diabetes Sister     Hypertension Brother     Diabetes Brother     Diabetes Maternal Grandmother     Diabetes Maternal Grandfather     Hypertension Brother     Diabetes Brother        Social History     Tobacco Use   Smoking Status Former    Packs/day: 1.00    Years: 40.00    Additional pack years: 0.00    Total pack years: 40.00    Types: Cigarettes    Quit date: 2017    Years since quittin.7   Smokeless Tobacco Never       Social History     Substance and Sexual Activity   Alcohol Use Not Currently    Comment: histgry of alcohol abuse         Review of Systems:   ROS         Vital Sign Min/Max for last 24 hours  Temp  Min: 97.1 °F (36.2 °C)  Max: 98.7 °F (37.1 °C)   BP  Min: 134/85  Max: 198/80   Pulse  Min: 54  Max: 73   Resp  Min: 16  Max: 18   SpO2  Min: 89 %  Max: 98 %   Flow (L/min)  Min: 2  Max: 2      Intake/Output Summary (Last 24 hours) at 2024 1410  Last data filed at 2024 2139  Gross per 24 hour   Intake --   Output 1150 ml   Net -1150 ml           Telemetry:  sinus rhythm    Constitutional:       Appearance: Healthy appearance. Not in distress.   Neck:      Vascular: No JVR. JVD normal.   Pulmonary:      Effort: Pulmonary effort is normal.      Breath sounds: Normal breath sounds. No wheezing. No rhonchi. No rales.   Chest:      Chest wall: Not tender to palpatation.   Abdominal:      General: Bowel  sounds are normal.      Palpations: Abdomen is soft.      Tenderness: There is no abdominal tenderness.   Musculoskeletal: Normal range of motion.         General: No tenderness. Skin:     General: Skin is warm and dry.   Neurological:      General: No focal deficit present.      Mental Status: Alert and oriented to person, place and time.              DATA REVIEW:    EKG (2/5/2024):     Vent. Rate :  72 BPM     Atrial Rate :  72 BPM     P-R Int : 200 ms          QRS Dur :  80 ms      QT Int : 360 ms       P-R-T Axes :  48  32  67 degrees     QTc Int : 394 ms     Sinus rhythm with premature atrial complexes  Septal infarct , age undetermined  Abnormal ECG  When compared with ECG of 15-ALEX-2024 07:47,  premature atrial complexes are now present  Septal findings present in 2017  Confirmed by JIM CERVANTES, RBIAN ([id number]) on 2/5/2024 3:41:45 PM    ECHO (1/16/2024):     Left ventricular ejection fraction appears to be 56 - 60%.    Left ventricular wall thickness is consistent with hypertrophy.    Estimated right ventricular systolic pressure from tricuspid regurgitation is mildly elevated (35-45 mmHg).    There is a small (1-2cm) pericardial effusion.    No evidence for pericardial tamponade         Results from last 7 days   Lab Units 02/06/24  1144 02/05/24  1146   SODIUM mmol/L 141 142   POTASSIUM mmol/L 4.9 5.4*   CHLORIDE mmol/L 104 107   BUN mg/dL 26* 30*   CREATININE mg/dL 1.43* 1.80*   MAGNESIUM mg/dL  --  2.1     Lab Results   Lab Value Date/Time    MG 2.1 02/05/2024 1146    MG 1.8 (L) 08/01/2022 0929     Results from last 7 days   Lab Units 02/05/24  1651 02/05/24  1146   HSTROP T ng/L 155* 168*     Results from last 7 days   Lab Units 02/06/24  1144 02/05/24  1146   WBC 10*3/mm3 6.30 7.84   HEMOGLOBIN g/dL 8.1* 7.7*   HEMATOCRIT % 25.6* 25.0*   PLATELETS 10*3/mm3 214 243     Lab Results   Component Value Date    HGBA1C 7.00 (H) 10/16/2022     Lab Results   Component Value Date    CHLPL 194 06/25/2022    TRIG 58  06/25/2022    HDL 44 06/25/2022    .4 (H) 06/25/2022              HFpEF  TTE (1/16/2024): LVEF 56-60%.  LVH.  Small pericardial effusion, without evidence of tamponade.  Pulmonary edema with small effusions on CT chest.  proBNP 1065.  HS troponin 168--> 155  Carvedilol, amlodipine, torsemide  CAD, CAC appreciated on non dedicated CT  Essential hypertension  T2DM, on insulin  JAYY on CKD 3  Chronic anemia      Plan  -Mild elevation in troponin is chronic and ongoing.  No chest fabricio.  Likely demand ischemia.  No aspirin secondary to anemia which at this point seems reasonable  -Pro bnp mildly elevatd and has returned to baseline.  Responding well to diuretics.  Appears from fluid stand point to be approaching baseline.  No significant JVD  Can likely start oral diuretics tomorrow, would resume his home regimen  -will arrange cardiology follow up  -may consider stopping BB in future as this may prone CHF exacerbations in those with preserved ejection fraction  -will sign off    Killian Mcpherson MD

## 2024-02-06 NOTE — MBS/VFSS/FEES
Acute Care - Speech Language Pathology   Swallow Initial Evaluation  Geraldo  Modified Barium Swallow Study (MBS)     Patient Name: Omkar Nava  : 1955  MRN: [id number]  Today's Date: 2024               Admit Date: 2024    Visit Dx:     ICD-10-CM ICD-9-CM   1. Encephalopathy  G93.40 348.30   2. Altered mental status, unspecified altered mental status type  R41.82 780.97   3. Elevated troponin  R79.89 790.6   4. Acute kidney injury  N17.9 584.9   5. 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, without long-term current use of insulin    Orthostatic hypotension    Acute osteomyelitis of left foot    Type 2 diabetes mellitus    Essential hypertension    Psychotic disorder    Neurocognitive disorder    S/P transmetatarsal amputation of foot, left    AMS (altered mental status)    Hypoxia    Abscess of left foot    Anemia of chronic disease    Aspiration pneumonia    Cellulitis of left toe    Cellulitis of lower extremity    Cervical spine pain    Chronic kidney disease    Closed head injury without loss of consciousness    Dementia    Dental cavities    Diarrhea of presumed infectious origin    Displaced fracture of proximal phalanx of left great toe, initial encounter for open fracture    Dysphagia    Erectile dysfunction    Fall    Gas gangrene    Generalized muscle weakness    Hallucinations    Hypertensive heart and chronic kidney disease 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 failure, with stage 1 through stage 4 chronic kidney disease, or unspecified chronic kidney disease    Insomnia due to medical condition    Iron deficiency anemia    Klebsiella pneumoniae (k. pneumoniae) as the cause of diseases classified elsewhere    Laceration without foreign body, left foot, subsequent encounter    Long term (current) use of insulin     Other acute osteomyelitis, left ankle and foot    Personal history of Methicillin resistant Staphylococcus aureus infection    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    Protein calorie malnutrition    Simple chronic bronchitis    Tobacco use    Type 2 diabetes mellitus with diabetic neuropathy, unspecified    Wound infection, posttraumatic    Type 2 diabetes mellitus with diabetic chronic kidney disease    Encephalopathy    Bilateral pneumonia    Acute respiratory failure with hypoxia     Past Medical History:   Diagnosis Date    Anemia     Dementia     Diabetes mellitus     Dysphagia     GERD (gastroesophageal reflux disease)     History of alcohol abuse     History of cocaine use     History of marijuana use     Hypertension     Osteomyelitis     Poor historian     records obtained from nursing home records & his family    Visual impairment      Past Surgical History:   Procedure Laterality Date    AMPUTATION FOOT Left 10/18/2022    Procedure: PARTIAL FIRST RAY AMPUTATION LEFT;  Surgeon: Yeison Petty MD;  Location:  [facility];  Service: Orthopedics;  Laterality: Left;    AMPUTATION FOOT Left 12/5/2022    Procedure: Transmetatarsal of Left Foot;  Surgeon: Yeison Petty MD;  Location:  [facility];  Service: Orthopedics;  Laterality: Left;    EYE SURGERY         SLP Recommendation and Plan  SLP Swallowing Diagnosis: moderate, oral dysphagia, mild, pharyngeal dysphagia (02/06/24 1055)  SLP Diet Recommendation: puree, thin liquids, other (see comments) (if concerns w/ thin liquids, consider downgrade to nectar-thick) (02/06/24 1055)  Recommended Precautions and Strategies: upright posture during/after eating, no straw, small bites of food and sips of liquid, general aspiration precautions, 1:1 supervision, assist with feeding (02/06/24 1055)  SLP Rec. for Method of Medication Administration: meds whole, meds crushed, with puree, as tolerated (02/06/24 1055)     Monitor for Signs of Aspiration: yes, notify SLP if any concerns  (02/06/24 1055)     Swallow Criteria for Skilled Therapeutic Interventions Met: demonstrates skilled criteria (02/06/24 1055)  Anticipated Discharge Disposition (SLP): skilled nursing facility (02/06/24 1055)  Rehab Potential/Prognosis, Swallowing: adequate, monitor progress closely (02/06/24 1055)  Therapy Frequency (Swallow): 5 days per week (02/06/24 1055)  Predicted Duration Therapy Intervention (Days): until discharge (02/06/24 1055)  Oral Care Recommendations: Oral Care BID/PRN, Suction toothbrush (02/06/24 1055)                                        Plan of Care Reviewed With: patient  Progress: improving      SWALLOW EVALUATION (last 72 hours)       SLP Adult Swallow Evaluation       Row Name 02/06/24 1055 02/05/24 1625                Rehab Evaluation    Document Type evaluation  - evaluation  -       Subjective Information no complaints  - no complaints  -       Patient Observations alert;cooperative  - cooperative;poorly cooperative  -       Patient/Family/Caregiver Comments/Observations Pleasantly confused. No family present.  - Family present  -       Patient Effort adequate  - good  -       Symptoms Noted During/After Treatment -- none  -          General Information    Patient Profile Reviewed yes  -AC yes  -       Pertinent History Of Current Problem See previous eval.  -AC Adm w/ AMS. Hx: HFrEF, DMII, chronic anemia, CKDIII, HTN, dementia, osteomyelitis. CXR: extensive bilateral airspace disease, suggestive of edema vs multifocal infiltrates. Head CT negative for acute intracranial abnormalities  -       Current Method of Nutrition pureed;nectar/syrup-thick liquids  - NPO  -       Precautions/Limitations, Vision -- difficult to assess  -       Precautions/Limitations, Hearing -- WFL;for purposes of eval  -       Prior Level of Function-Communication -- other (see comments)  dementia per chart  -       Prior Level of Function-Swallowing -- other (see comments)  CSE  completed August '23 w/ recs for soft/whole solid diet w/ thin liquids. Pt returned to regular diet w/ thin liquids following d/c per family report  -       Plans/Goals Discussed with patient  - patient;family;agreed upon  [facility]       Barriers to Rehab medically complex;cognitive status  - medically complex;cognitive status  -       Patient's Goals for Discharge patient could not state  - patient did not state  -       Family Goals for Discharge -- family did not state  -          Pain    Additional Documentation -- Pain Scale: FACES Pre/Post-Treatment (Group)  -          Pain Scale: FACES Pre/Post-Treatment    Pain: FACES Scale, Pretreatment 0-->no hurt  -AC 0-->no hurt  -       Posttreatment Pain Rating 0-->no hurt  -AC 0-->no hurt  [facility]          Oral Motor Structure and Function    Dentition Assessment -- missing teeth  -       Secretion Management -- WNL/WFL  -       Mucosal Quality -- dry  -          Oral Musculature and Cranial Nerve Assessment    Oral Motor General Assessment -- generalized oral motor weakness  -          General Eating/Swallowing Observations    Respiratory Support Currently in Use -- nasal cannula  -       Eating/Swallowing Skills fed by SLP  impulsive--pt attempted to take large drinks from cup/straw  - self-fed;fed by [facility]  -       Positioning During Eating upright 90 degree;upright in chair  - upright in bed  -       Utensils Used -- spoon;cup;straw  -       Consistencies Trialed -- ice chips;thin liquids;nectar/syrup-thick liquids;pureed  -          Clinical Swallow Eval    Oral Prep Phase -- impaired  -       Oral Transit -- impaired  -       Pharyngeal Phase -- suspected pharyngeal impairment  -       Clinical Swallow Evaluation Summary -- Pt w/ overt s/s of aspiration c/b intermittent throat clear w/ thin liquid trials regardless of presentation method. No overt s/s of aspiration w/ NTL or pureed trials. Oral phase generally weak appearing.  Pt noted w/ anterior loss & increased oral transit time. Regular solid trials deferred. Recommend pureed diet w/ NTL, no mixed consistencies, assistance w/ feeding, small/single sips. Meds crushed as able in pureed. [place] [facility] [place]  -          Oral Prep Concerns    Oral Prep Concerns -- anterior loss  -       Anterior Loss -- thin  -          Oral Transit Concerns    Oral Transit Concerns -- delayed initiation of bolus transit  -       Delayed Intiation of Bolus Transit -- thin;nectar  -          Pharyngeal Phase Concerns    Pharyngeal Phase Concerns -- throat clear  -       Throat Clear -- thin  -MH          MBS/VFSS    Utensils Used spoon;cup;straw  -AC --       Consistencies Trialed thin liquids;pudding thick;soft to chew textures;chopped  -AC --          MBS/VFSS Interpretation    Oral Prep Phase impaired oral phase of swallowing  -AC --       Oral Transit Phase impaired  -AC --       Oral Residue impaired  -AC --          Oral Preparatory Phase    Oral Preparatory Phase inadequate manipulation  -AC --       Inadequate Manipulation mechanical soft;secondary to reduced lingual strength;secondary to impaired cognitive status  -AC --          Oral Transit Phase    Impaired Oral Transit Phase increased A-P transit time;delayed initiation of bolus transit  -AC --       Delayed Initiation of bolus transit pudding/puree;mechanical soft;discoordination of lingual movement;secondary to impaired cognitive status  -AC --       Increased A-P Transit Time pudding/puree;mechanical soft;discoordination of lingual movement;secondary to impaired cognitive status  -AC --          Oral Residue    Impaired Oral Residue lingual residue  -AC --       Lingual Residue all consistencies tested;secondary to reduced lingual strength  -AC --       Response to Oral Residue partial residue clearance;with spontaneous subsequent swallow  -AC --       Oral Residue, Comment Mild  -AC --          Initiation of Pharyngeal Swallow     Initiation of Pharyngeal Swallow bolus in pyriform sinuses  -AC --       Pharyngeal Phase impaired pharyngeal phase of swallowing  -AC --       Penetration Before the Swallow thin liquids;secondary to delayed swallow initiation or mistiming;other (see comments)  consecutive straw drinks, deepened  -AC --       Penetration During the Swallow thin liquids;secondary to delayed swallow initiation or mistiming;other (see comments)  x1 w/ cup drink; trace/transient  -AC --       Aspiration During the Swallow thin liquids;secondary to delayed swallow initiation or mistiming;other (see comments)  consecutive straw drinks x1, appeared transient  -AC --       Response to Penetration No  -AC --       No spontaneous response to penetration and non-effective laryngeal clearance with cue (see comments)  -AC --       Response to Aspiration No  -AC --       Rosenbek's Scale thin:;6--->level 6  -AC --       Pharyngeal Residue no significant pharyngeal residue noted  -AC --       Attempted Compensatory Maneuvers bolus size;bolus presentation style;other (see comments)  limited by pt cognitive status  -AC --       Pharyngeal Phase, Comment --  -AC --          SLP Evaluation Clinical Impression    SLP Swallowing Diagnosis moderate;oral dysphagia;mild;pharyngeal dysphagia  -AC oral dysphagia;suspected pharyngeal dysphagia  -       Functional Impact risk of aspiration/pneumonia;risk of malnutrition;risk of dehydration  -AC risk of aspiration/pneumonia  -       Rehab Potential/Prognosis, Swallowing adequate, monitor progress closely  -AC good, to achieve stated therapy goals  -       Swallow Criteria for Skilled Therapeutic Interventions Met demonstrates skilled criteria  - demonstrates skilled criteria  -          Recommendations    Therapy Frequency (Swallow) 5 days per week  -AC --       Predicted Duration Therapy Intervention (Days) until discharge  -AC until discharge  -       SLP Diet Recommendation puree;thin  liquids;other (see comments)  if concerns w/ thin liquids, consider downgrade to nectar-thick  - puree;nectar thick liquids;no mixed consistencies  -       Recommended Diagnostics -- reassess via VFSS ([facility] [place])  -       Recommended Precautions and Strategies upright posture during/after eating;no straw;small bites of food and sips of liquid;general aspiration precautions;1:1 supervision;assist with feeding  - general aspiration precautions;assist with feeding;other (see comments)  small/single sips  -       Oral Care Recommendations Oral Care BID/PRN;Suction toothbrush  - Oral Care BID/PRN;Suction toothbrush  -       SLP Rec. for Method of Medication Administration meds whole;meds crushed;with puree;as tolerated  - meds crushed;with puree;as tolerated  -       Monitor for Signs of Aspiration yes;notify SLP if any concerns  - yes;notify SLP if any concerns  -       Anticipated Discharge Disposition (SLP) skilled nursing facility  - skilled nursing facility  -       Demonstrates Need for Referral to Another Service -- speech therapy;other (see comments)  ? [facility] eval  -                 User Key  (r) = Recorded By, (t) = Taken By, (c) = Cosigned By      Initials Name Effective Dates    AC Kimberlee Castillo MS CCC-SLP 02/03/23 -      Kathy Gonzales, MS CCC-SLP 05/12/23 -                     EDUCATION  The patient has been educated in the following areas:   Dysphagia (Swallowing Impairment) Modified Diet Instruction.        SLP GOALS       Row Name 02/06/24 1055             (LTG) Patient will demonstrate functional swallow for    Diet Texture (Demonstrate functional swallow) pureed textures  -      Liquid viscosity (Demonstrate functional swallow) thin liquids  -      [place] (Demonstrate functional swallow) with 1:1 assist/ supervision  -      Time Frame (Demonstrate functional swallow) by discharge  -      Barriers (Demonstrate functional swallow) ? Baseline oral dysphagia  -AC          (STG) Patient will tolerate trials of    Consistencies Trialed (Tolerate trials) pureed textures;thin liquids  -AC      Desired Outcome (Tolerate trials) without signs/symptoms of aspiration;with adequate oral prep/transit/clearance;with use of compensatory strategies (see comments)  no straws, small bolus size  -AC      [place] (Tolerate trials) with 1:1 assist/ supervision  -AC      Time Frame (Tolerate trials) by discharge  -AC         (STG) Patient will tolerate therapeutic trials of    Consistencies Trialed (Tolerate therapeutic trials) mechanical ground textures  -AC      Desired Outcome (Tolerate therapeutic trials) without signs/symptoms of aspiration;with adequate oral prep/transit/clearance  -AC      [place] (Tolerate therapeutic trials) with 1:1 assist/ supervision  -AC      Time Frame (Tolerate therapeutic trials) by discharge  -AC         (STG) Lingual Strengthening Goal 1 (SLP)    Activity (Lingual Strengthening Goal 1, SLP) increase lingual tone/sensation/control/coordination/movement  -AC      Increase Lingual Tone/Sensation/Control/Coordination/Movement lingual resistance exercises  -AC      [place]/Accuracy (Lingual Strengthening Goal 1, SLP) with moderate cues (50-74% accuracy)  -AC      Time Frame (Lingual Strengthening Goal 1, SLP) short term goal (STG)  -AC         (STG) Pharyngeal Strengthening Exercise Goal 1 (SLP)    Activity (Pharyngeal Strengthening Goal 1, SLP) increase timing  -AC      Increase Timing prepping - 3 second prep or suck swallow or 3-step swallow  -AC      [place]/Accuracy (Pharyngeal Strengthening Goal 1, SLP) with moderate cues (50-74% accuracy)  -AC      Time Frame (Pharyngeal Strengthening Goal 1, SLP) short term goal (STG)  -AC                User Key  (r) = Recorded By, (t) = Taken By, (c) = Cosigned By      Initials Name Provider Type    Kimberlee Pastor MS CCC-SLP Speech and Language Pathologist                       Time Calculation:     Time Calculation- SLP       Row Name 02/06/24 1605             Time Calculation- SLP    SLP Start Time 1055  -AC      SLP Received On 02/06/24  -AC         Untimed Charges    [id number] Motion Fluoro Eval Swallow Minutes 61  -AC         Total Minutes    Untimed Charges Total Minutes 61  -AC       Total Minutes 61  -AC                User Key  (r) = Recorded By, (t) = Taken By, (c) = Cosigned By      Initials Name Provider Type     Kimberlee Castillo MS CCC-SLP Speech and Language Pathologist                    Therapy Charges for Today       Code Description Service Date Service Provider Modifiers Qty    [id number]  ST MOTION FLUORO EVAL SWALLOW 4 2/6/2024 Kimberlee Castillo MS CCC-SLP GN 1                 Kimberlee Castillo MS CCC-SLP  2/6/2024

## 2024-02-06 NOTE — PLAN OF CARE
Goal Outcome Evaluation:  Plan of Care Reviewed With: patient, daughter           Outcome Evaluation: Pt. presents below baseline function w/generalized weakness and balance deficits affecting his ability to safely participate in functional mobility. He performed bed mobility, transfers and ambulated 4' w/front wheeled walker, min assist of 2. Activity limited by instability. Pt. would benefit from IPPT to address stated deficits.      Anticipated Discharge Disposition (PT): skilled nursing facility

## 2024-02-06 NOTE — PROGRESS NOTES
[facility] Medicine Services  PROGRESS NOTE    Patient Name: Omkar Nava  : 1955  MRN: [id number]    Date of Admission: 2024  Primary Care Physician: Deann Cao MD    Subjective   Subjective     CC:  confusion    HPI:  On 2L, satting 97%. Had 1 BM. Afebrile.  Has been calm this morning.  Denied pain.    Objective   Objective     Vital Signs:   Temp:  [97.8 °F (36.6 °C)-99.3 °F (37.4 °C)] 97.8 °F (36.6 °C)  Heart Rate:  [58-73] 58  Resp:  [16-18] 18  BP: (134-183)/(62-98) 156/84  Flow (L/min):  [2] 2     Physical Exam:  Constitutional: No acute distress, awake, alert  HENT: NCAT, mucous membranes moist  Respiratory: Clear to auscultation bilaterally, respiratory effort normal   Cardiovascular: RRR, no murmurs, rubs, or gallops  Gastrointestinal: Positive bowel sounds, soft, nontender, nondistended  Musculoskeletal: No bilateral ankle edema, left TMA  Psychiatric: Appropriate affect, cooperative  Neurologic: PERRL, symmetric facies, speech clear, oriented to self, , thinks he is at his cousin's house, follows simple commands  Skin: No rashes    Results Reviewed:  LAB RESULTS:      Lab 24  1146   WBC  --  7.84   HEMOGLOBIN  --  7.7*   HEMATOCRIT  --  25.0*   PLATELETS  --  243   NEUTROS ABS  --  5.19   IMMATURE GRANS (ABS)  --  0.02   LYMPHS ABS  --  1.74   MONOS ABS  --  0.79   EOS ABS  --  0.09   MCV  --  95.8   PROCALCITONIN  --  0.16   LACTATE 0.9 1.0         Lab 24  1146   SODIUM 142   POTASSIUM 5.4*   CHLORIDE 107   CO2 27.0   ANION GAP 8.0   BUN 30*   CREATININE 1.80*   EGFR 41.0*   GLUCOSE 125*   CALCIUM 9.9   MAGNESIUM 2.1         Lab 24  1146   TOTAL PROTEIN 7.0   ALBUMIN 3.5   GLOBULIN 3.5   ALT (SGPT) 33   AST (SGOT) 21   BILIRUBIN 0.3   ALK PHOS 89         Lab 24  1651 24  1146   PROBNP 1,065.0* 1,446.0*   HSTROP T 155* 168*                 Brief Urine Lab Results  (Last result in the past 365 days)         Color   Clarity   Blood   Leuk Est   Nitrite   Protein   CREAT   Urine HCG        02/05/24 1146 Yellow   Clear   Negative   Negative   Negative   Negative                   Microbiology Results Abnormal       Procedure Component Value - Date/Time    S. Pneumo Ag Urine or CSF - Urine, Urine, Clean Catch [[id number]]  (Normal) Collected: 02/05/24 1718    Lab Status: Final result Specimen: Urine, Clean Catch Updated: 02/06/24 0101     Strep Pneumo Ag Negative    Legionella Antigen, Urine - Urine, Urine, Clean Catch [[id number]]  (Normal) Collected: 02/05/24 1718    Lab Status: Final result Specimen: Urine, Clean Catch Updated: 02/06/24 0101     LEGIONELLA ANTIGEN, URINE Negative    Respiratory Panel PCR w/COVID-19(SARS-CoV-2) GIANNI/SIENNA/ANNA/PAD/COR/ASHIA In-House, NP Swab in UTM/VTM, 2 HR TAT - Swab, Nasopharynx [[id number]]  (Normal) Collected: 02/05/24 1720    Lab Status: Final result Specimen: Swab from Nasopharynx Updated: 02/05/24 1810     ADENOVIRUS, PCR Not Detected     Coronavirus 229E Not Detected     Coronavirus HKU1 Not Detected     Coronavirus NL63 Not Detected     Coronavirus OC43 Not Detected     COVID19 Not Detected     Human Metapneumovirus Not Detected     Human Rhinovirus/Enterovirus Not Detected     Influenza A PCR Not Detected     Influenza B PCR Not Detected     Parainfluenza Virus 1 Not Detected     Parainfluenza Virus 2 Not Detected     Parainfluenza Virus 3 Not Detected     Parainfluenza Virus 4 Not Detected     RSV, PCR Not Detected     Bordetella pertussis pcr Not Detected     Bordetella parapertussis PCR Not Detected     Chlamydophila pneumoniae PCR Not Detected     Mycoplasma pneumo by PCR Not Detected    Narrative:      In the setting of a positive respiratory panel with a viral infection PLUS a negative procalcitonin without other underlying concern for bacterial infection, consider observing off antibiotics or discontinuation of antibiotics and continue supportive care. If the respiratory panel  is positive for atypical bacterial infection (Bordetella pertussis, Chlamydophila pneumoniae, or Mycoplasma pneumoniae), consider antibiotic de-escalation to target atypical bacterial infection.    COVID PRE-OP / PRE-PROCEDURE SCREENING ORDER (NO ISOLATION) - Swab, Nasopharynx [[id number]]  (Normal) Collected: 02/05/24 1302    Lab Status: Final result Specimen: Swab from Nasopharynx Updated: 02/05/24 1430    Narrative:      The following orders were created for panel order COVID PRE-OP / PRE-PROCEDURE SCREENING ORDER (NO ISOLATION) - Swab, Nasopharynx.  Procedure                               Abnormality         Status                     ---------                               -----------         ------                     COVID-19 and FLU A/B PCR...[[id number]]  Normal              Final result                 Please view results for these tests on the individual orders.    COVID-19 and FLU A/B PCR, 1 HR TAT - Swab, Nasopharynx [[id number]]  (Normal) Collected: 02/05/24 1302    Lab Status: Final result Specimen: Swab from Nasopharynx Updated: 02/05/24 1430     COVID19 Not Detected     Influenza A PCR Not Detected     Influenza B PCR Not Detected    Narrative:      Fact sheet for providers: https://www.fda.gov/media/586915/download    Fact sheet for patients: https://www.fda.gov/media/451016/download    Test performed by PCR.            EEG    Result Date: 2/5/2024  Reason for referral: 68 y.o.male with altered mental status, consideration of seizures Technical Summary:  A 19 channel digital EEG was performed using the international 10-20 placement system, including eye leads and EKG leads. Duration: 20 minutes Findings: The patient is awake and resting comfortably in bed.  The background shows diffuse medium amplitude 5 to 6 Hz theta which is present symmetrically over both hemispheres.  A clear posterior rhythm is not seen.  EMG artifact is variably prominent over the temporal leads.  Drowsiness is seen with mild  slowing of the tracing but stage II sleep is not seen.  No focal features are present.  No epileptiform activity is seen.  Photic stimulation does not change the background.  Hyperventilation is not performed. Video: Available Technical quality: Superior EKG: Irregular, 50-80 bpm SUMMARY: Mild generalized slow No focal features or epileptiform activity are seen     Impression: Diffuse cerebral dysfunction of mild degree, nonspecific.  This finding is most commonly seen with encephalopathy due to toxic/metabolic cause No evidence for epilepsy is seen This report is transcribed using the Dragon dictation system.      CT Chest Without Contrast Diagnostic    Result Date: 2/5/2024  CT CHEST WO CONTRAST DIAGNOSTIC Date of Exam: 2/5/2024 2:25 PM EST Indication: Pneumonia, complication suspected, xray done. Comparison: Plain film of earlier today. CT chest of 5/6/2017.. Technique: Axial CT images were obtained of the chest without contrast administration.  Reconstructed coronal and sagittal images were also obtained. Automated exposure control and iterative construction methods were used. Findings: There is moderate cardiomegaly. There is a very small pericardial effusion. There are small bilateral pleural effusions. There is artifact from the patient's arms. There are multiple mediastinal nodes. A precarinal node measures 15 mm in short axis. An AP window node measures 13 mm in short axis. There is mild subcarinal adenopathy. There is pulmonary vascular congestion. There are groundglass infiltrates in the bilateral upper and lower lobes. There is compressive atelectasis in the lower lobes.     Impression: Impression: Findings are compatible with CHF with moderate degree of pulmonary edema. Small effusions. Mild nonspecific mediastinal adenopathy. Electronically Signed: Jennifer Bo MD  2/5/2024 2:49 PM EST  Workstation ID: [id number]    XR Chest 1 View    Result Date: 2/5/2024  XR CHEST 1 VW Date of Exam: 2/5/2024 12:05  PM EST Indication: Weak/Dizzy/AMS triage protocol Comparison: 1/15/2024 Findings: Cardiac silhouette is magnified. Extensive bilateral airspace disease may represent edema or multifocal infiltrates. No significant pleural effusion or pneumothorax. No acute osseous lesion is seen.     Impression: Impression: Extensive bilateral airspace disease may represent edema or multifocal infiltrates. Electronically Signed: Ryan Perales MD  2/5/2024 12:21 PM EST  Workstation ID: [id number]    CT Cervical Spine Without Contrast    Result Date: 2/5/2024  CT CERVICAL SPINE WO CONTRAST Date of Exam: 2/5/2024 11:50 AM EST Indication: Bone mass or bone pain, cervical spine, aggressive features on xray. Comparison: None available. Technique: Axial CT images were obtained of the cervical spine without contrast administration.  Reconstructed coronal and sagittal images were also obtained. Automated exposure control and iterative construction methods were used. FINDINGS: Examination is somewhat limited due to motion artifact. No acute fracture or subluxation is identified. Mild cervical spondylosis is present. Alignment is otherwise unremarkable. Vertebral body heights are maintained. The craniocervical and atlantoaxial junctions appear within normal limits. The prevertebral and paraspinal soft tissues are without focal abnormality. Carotid calcifications are present. Please refer to the accompanying head CT for description of intracranial findings.     Impression: 1.Examination is somewhat limited due to motion artifact. 2.No acute fracture or subluxation identified within the cervical spine. 3.Mild cervical spondylosis. Electronically Signed: Ryan Perales MD  2/5/2024 12:10 PM EST  Workstation ID: [id number]    CT Head Without Contrast    Result Date: 2/5/2024  CT HEAD WO CONTRAST Date of Exam: 2/5/2024 11:50 AM EST Indication: Mental status change, unknown cause. Comparison: Head CT dated 1/20/2024 Technique: Axial CT images were  obtained of the head without contrast administration.  Automated exposure control and iterative construction methods were used. FINDINGS:  Foci of periventricular and subcortical white matter hypoattenuation are consistent with chronic, microvascular ischemic change. There is age-related loss of brain parenchymal volume with prominence of sulcation and ventriculomegaly. No significant mass effect, midline shift, intracranial hemorrhage, or hydrocephalus is identified. No extra-axial fluid collection is identified.   The calvarium and overlying soft tissues are unremarkable. Mild scattered paranasal sinus mucosal thickening. Surgical changes of the right globe. Orbital structures demonstrate no acute abnormality.     Impression: 1.No acute intracranial abnormality is identified. 2.Findings compatible with chronic microvascular ischemic change and diffuse cortical atrophy. 3.Mild scattered paranasal sinus mucosal thickening. Electronically Signed: Ryan Perales MD  2/5/2024 12:06 PM EST  Workstation ID: [id number]     Results for orders placed during the hospital encounter of 01/15/24    Adult Transthoracic Echo Complete With Contrast if Necessary Per Protocol    Interpretation Summary    Left ventricular ejection fraction appears to be 56 - 60%.    Left ventricular wall thickness is consistent with hypertrophy.    Estimated right ventricular systolic pressure from tricuspid regurgitation is mildly elevated (35-45 mmHg).    There is a small (1-2cm) pericardial effusion.    No evidence for pericardial tamponade      Current medications:  Scheduled Meds:amLODIPine, 10 mg, Oral, Q24H  atorvastatin, 20 mg, Oral, Nightly  brimonidine, 1 drop, Both Eyes, BID   And  timolol, 1 drop, Both Eyes, BID  carvedilol, 25 mg, Oral, BID With Meals  cefepime, 2,000 mg, Intravenous, Q12H  divalproex, 375 mg, Oral, BID  famotidine, 20 mg, Oral, BID  ferrous sulfate, 325 mg, Oral, Daily With Breakfast  FLUoxetine, 20 mg, Oral,  BID  hydrALAZINE, 50 mg, Oral, TID  insulin lispro, 2-7 Units, Subcutaneous, 4x Daily AC & at Bedtime  ipratropium-albuterol, 3 mL, Nebulization, 4x Daily - RT  melatonin, 5 mg, Oral, Nightly  senna-docusate sodium, 2 tablet, Oral, BID  sodium chloride, 10 mL, Intravenous, Q12H  thiamine, 100 mg, Oral, Daily  traZODone, 25 mg, Oral, Nightly  vancomycin (dosing per levels), , Does not apply, Daily      Continuous Infusions:Pharmacy Consult,   Pharmacy to dose vancomycin,       PRN Meds:.  Pharmacy Consult    acetaminophen    senna-docusate sodium **AND** polyethylene glycol **AND** bisacodyl **AND** bisacodyl    dextrose    dextrose    glucagon (human recombinant)    hydrOXYzine    ondansetron    Pharmacy to dose vancomycin    sodium chloride    sodium chloride    sodium chloride    Assessment & Plan   Assessment & Plan     Active Hospital Problems    Diagnosis  POA    **Encephalopathy [G93.40]  Yes    Bilateral pneumonia [J18.9]  Yes    Acute respiratory failure with hypoxia [J96.01]  Yes    Chronic kidney disease [N18.9]  Yes    Dementia [F03.90]  Yes    Anemia of chronic disease [D63.8]  Yes    Essential hypertension [I10]  Yes    Type 2 diabetes mellitus [E11.9]  Yes      Resolved Hospital Problems   No resolved problems to display.        Brief Hospital Course to date:  Omkar Nava is a 68 y.o. male with PMHx significant for HFrEF, DMII, chronic anemia, CKDIII, HTN, Hx of Osteomyelitis s/p transmetatarsal amputation, dementia, HTN, recent admission for PNA, heart failure exacerbation, and JAYY, who is current resident of [facility] who presented with AMS and hypoxia.    This patient's problems and plans were partially entered by my partner and updated as appropriate by me 02/06/24.     AMS, resolved  -concern for seizure given tongue laceration, no prior hx of seizures, does take Depakote, however family states it is not for seizures  -EEG negative for seizures  -s/p load w/depakote 1.5g IV x 1 per  Neurology  -Continue Depakote DR 375mg twice daily for behavior  -Neurology following, discussed with SHIELA Crews this morning     Acute on Chronic Hypoxic Respiratory Failure  Possible Exacerbation HFpEF  - was on 3L, 78% on room air on admission  - recent admission for pneumonia w/positive MRSA PCR, discharged on linezolid 24  - CT chest most consistent with volume overload  - Still MRSA positive, procal wnl, proBNP 1065  - Stop Vanc/cefepime  - SLP evaluation, will keep NPO except meds for now  - Echo done on 25 with EF 56-60%  - s/p lasix 60mg IV on   - continue diuresis  - cards consult per family request, placed for the AM     JAYY on CKDIII, suspect cardiorenal syndrome  - baseline around 1.3, was 1.8 on admission  - avoid nephrotoxins, repeat in AM     Elevated Troponin, likely demand in setting of above  - EKG appears unchanged from prior, suspect due to above, although he is unable to say if he has chest pain or not  - troponin downtrending     HTN:  - continue coreg, norvasc, hydralazine     DMII:  - SSI coverage for now     Hx of Dementia:  - continue home regimen  - high risk for hospital delirium     Updated the patient's daughter on plan of care, verified patient name and  first. Family would like to see cardiology while here given that he was supposed to see them outpatient on     Expected Discharge Location and Transportation: [facility]  Expected Discharge   Expected Discharge Date: 2024; Expected Discharge Time:      DVT prophylaxis:  Mechanical DVT prophylaxis orders are present.         AM-PAC 6 Clicks Score (PT): 12 (24 2621)    CODE STATUS:   Code Status and Medical Interventions:   Ordered at: 24 1326     Level Of Support Discussed With:    Health Care Surrogate     Code Status (Patient has no pulse and is not breathing):    CPR (Attempt to Resuscitate)     Medical Interventions (Patient has pulse or is breathing):    Full Support       Alexandra De Leon  MD  02/06/24

## 2024-02-06 NOTE — PLAN OF CARE
Goal Outcome Evaluation:  Plan of Care Reviewed With: patient        Progress: improving         Anticipated Discharge Disposition (SLP): skilled nursing facility          SLP Swallowing Diagnosis: moderate, oral dysphagia, mild, pharyngeal dysphagia (02/06/24 5835)

## 2024-02-06 NOTE — THERAPY EVALUATION
Patient Name: Omkar Nava  : 1955    MRN: [id number]                              Today's Date: 2024       Admit Date: 2024    Visit Dx:     ICD-10-CM ICD-9-CM   1. Encephalopathy  G93.40 348.30   2. Altered mental status, unspecified altered mental status type  R41.82 780.97   3. Elevated troponin  R79.89 790.6   4. Acute kidney injury  N17.9 584.9   5. 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, without long-term current use of insulin    Orthostatic hypotension    Acute osteomyelitis of left foot    Type 2 diabetes mellitus    Essential hypertension    Psychotic disorder    Neurocognitive disorder    S/P transmetatarsal amputation of foot, left    AMS (altered mental status)    Hypoxia    Abscess of left foot    Anemia of chronic disease    Aspiration pneumonia    Cellulitis of left toe    Cellulitis of lower extremity    Cervical spine pain    Chronic kidney disease    Closed head injury without loss of consciousness    Dementia    Dental cavities    Diarrhea of presumed infectious origin    Displaced fracture of proximal phalanx of left great toe, initial encounter for open fracture    Dysphagia    Erectile dysfunction    Fall    Gas gangrene    Generalized muscle weakness    Hallucinations    Hypertensive heart and chronic kidney disease 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 failure, with stage 1 through stage 4 chronic kidney disease, or unspecified chronic kidney disease    Insomnia due to medical condition    Iron deficiency anemia    Klebsiella pneumoniae (k. pneumoniae) as the cause of diseases classified elsewhere    Laceration without foreign body, left foot, subsequent encounter    Long term (current) use of insulin    Other acute osteomyelitis, left ankle and foot    Personal history of Methicillin resistant  Staphylococcus aureus infection    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    Protein calorie malnutrition    Simple chronic bronchitis    Tobacco use    Type 2 diabetes mellitus with diabetic neuropathy, unspecified    Wound infection, posttraumatic    Type 2 diabetes mellitus with diabetic chronic kidney disease    Encephalopathy    Bilateral pneumonia    Acute respiratory failure with hypoxia     Past Medical History:   Diagnosis Date    Anemia     Dementia     Diabetes mellitus     Dysphagia     GERD (gastroesophageal reflux disease)     History of alcohol abuse     History of cocaine use     History of marijuana use     Hypertension     Osteomyelitis     Poor historian     records obtained from nursing home records & his family    Visual impairment      Past Surgical History:   Procedure Laterality Date    AMPUTATION FOOT Left 10/18/2022    Procedure: PARTIAL FIRST RAY AMPUTATION LEFT;  Surgeon: Yeison Petty MD;  Location:  [facility] OR;  Service: Orthopedics;  Laterality: Left;    AMPUTATION FOOT Left 12/5/2022    Procedure: Transmetatarsal of Left Foot;  Surgeon: Yeison Petty MD;  Location:  [facility] OR;  Service: Orthopedics;  Laterality: Left;    EYE SURGERY        General Information       Row Name 02/06/24 1512          Physical Therapy Time and Intention    Document Type evaluation  -SS     Mode of Treatment physical therapy  -SS       Row Name 02/06/24 2101          General Information    Patient Profile Reviewed yes  -SS     Prior Level of Function mod assist:;gait;transfer;w/c or scooter;bed mobility  per daughter report: pt utilizing WC for mobility w/assist required for all functional mobility and ambulating some with FWW w/PT at facility, several acute falls  -SS     Existing Precautions/Restrictions fall;oxygen therapy device and L/min;other (see comments)  L transmet amp x 5, visually impaired  -SS     Barriers to Rehab medically complex;previous functional deficit;cognitive status;visual deficit  -SS       Row  Name 02/06/24 1512          Living Environment    People in Home facility resident  -SS       Row Name 02/06/24 1512          Home Main Entrance    Number of Stairs, Main Entrance none  -SS       Row Name 02/06/24 1512          Stairs Within Home, Primary    Number of Stairs, Within Home, Primary none  -SS       Row Name 02/06/24 1512          Cognition    Orientation Status (Cognition) oriented to;person;disoriented to;place;situation;time  -SS       Row Name 02/06/24 1512          Safety Issues, Functional Mobility    Safety Issues Affecting Function (Mobility) awareness of need for assistance;insight into deficits/self-awareness;judgment;positioning of assistive device;problem-solving;safety precaution awareness;safety precautions follow-through/compliance;sequencing abilities  -SS     Impairments Affecting Function (Mobility) balance;endurance/activity tolerance;pain;postural/trunk control;range of motion (ROM);strength;cognition  -SS     Cognitive Impairments, Mobility Safety/Performance attention;awareness, need for assistance;insight into deficits/self-awareness;judgment;problem-solving/reasoning;safety precaution awareness;safety precaution follow-through;sequencing abilities  -     Comment, Safety Issues/Impairments (Mobility) follows single step commands better  -               User Key  (r) = Recorded By, (t) = Taken By, (c) = Cosigned By      Initials Name Provider Type     Karlee Garrett PT Physical Therapist                   Mobility       Row Name 02/06/24 1517          Bed Mobility    Bed Mobility scooting/bridging;supine-sit  -SS     Scooting/Bridging [place] (Bed Mobility) minimum assist (75% patient effort);2 person assist;verbal cues  -     Supine-Sit [place] (Bed Mobility) minimum assist (75% patient effort);2 person assist;verbal cues  -     Assistive Device (Bed Mobility) bed rails;draw sheet;head of bed elevated  -     Comment, (Bed Mobility) VC for sequencing  -SS        Row Name 02/06/24 1517          Sit-Stand Transfer    Sit-Stand [place] (Transfers) minimum assist (75% patient effort);2 person assist;verbal cues  -     Assistive Device (Sit-Stand Transfers) walker, front-wheeled  -SS     Comment, (Sit-Stand Transfer) VC for hand placement, appropriate alignment, lowering with eccentric control  -       Row Name 02/06/24 1517          Gait/Stairs (Locomotion)    [place] Level (Gait) moderate assist (50% patient effort);2 person assist;verbal cues  -SS     Assistive Device (Gait) walker, front-wheeled  -SS     Distance in Feet (Gait) 4  -SS     Deviations/Abnormal Patterns (Gait) bilateral deviations;base of support, narrow;arianne decreased;stride length decreased;weight shifting decreased  -     Bilateral Gait Deviations forward flexed posture;heel strike decreased  -     Comment, (Gait/Stairs) Pt. took one step forward/backward and several side steps to head of bed. VC for sequencing of steps/AD management. Posterior lean and instability noted throughout.  -               User Key  (r) = Recorded By, (t) = Taken By, (c) = Cosigned By      Initials Name Provider Type    SS Karlee Garrett, PT Physical Therapist                   Obj/Interventions       Row Name 02/06/24 1519          Range of Motion Comprehensive    General Range of Motion bilateral lower extremity ROM WFL  -       Row Name 02/06/24 1519          Strength Comprehensive (MMT)    Comment, General Manual Muscle Testing (MMT) Assessment BLE gross 3+/5  -SS       Row Name 02/06/24 1519          Balance    Balance Assessment sitting static balance;sitting dynamic balance;sit to stand dynamic balance;standing dynamic balance;standing static balance  -     Static Sitting Balance modified independence  -     Dynamic Sitting Balance contact guard  -     Position, Sitting Balance unsupported;sitting edge of bed  -     Sit to Stand Dynamic Balance minimal assist;2-person assist  -      Static Standing Balance minimal assist;2-person assist  -SS     Dynamic Standing Balance moderate assist;2-person assist  -SS     Position/Device Used, Standing Balance supported;walker, front-wheeled  -SS     Balance Interventions sitting;standing;sit to stand;supported;static;dynamic  -[facility] Name 02/06/24 1519          Sensory Assessment (Somatosensory)    Sensory Assessment (Somatosensory) LE sensation intact  -               User Key  (r) = Recorded By, (t) = Taken By, (c) = Cosigned By      Initials Name Provider Type     Karlee Garrett, PT Physical Therapist                   Goals/Plan       Row Name 02/06/24 1525          Bed Mobility Goal 1 (PT)    Activity/Assistive Device (Bed Mobility Goal 1, PT) bed mobility activities, all  -SS     [place] Level/Cues Needed (Bed Mobility Goal 1, PT) contact guard required  -SS     Time Frame (Bed Mobility Goal 1, PT) long term goal (LTG);10 days  -[facility] Name 02/06/24 1525          Transfer Goal 1 (PT)    Activity/Assistive Device (Transfer Goal 1, PT) sit-to-stand/stand-to-sit;bed-to-chair/chair-to-bed  -SS     [place] Level/Cues Needed (Transfer Goal 1, PT) contact guard required  -SS     Time Frame (Transfer Goal 1, PT) long term goal (LTG);10 days  -[facility] Name 02/06/24 1525          Gait Training Goal 1 (PT)    Activity/Assistive Device (Gait Training Goal 1, PT) gait (walking locomotion);assistive device use;walker, rolling  -SS     [place] Level (Gait Training Goal 1, PT) contact guard required  -SS     Distance (Gait Training Goal 1, PT) 50  -SS     Time Frame (Gait Training Goal 1, PT) long term goal (LTG);10 days  -[facility] Name 02/06/24 1525          Therapy Assessment/Plan (PT)    Planned Therapy Interventions (PT) balance training;bed mobility training;gait training;home exercise program;neuromuscular re-education;postural re-education;patient/family education;ROM (range of motion);strengthening;stretching;transfer  training;motor coordination training  -               User Key  (r) = Recorded By, (t) = Taken By, (c) = Cosigned By      Initials Name Provider Type    SS Karlee Garrett, PT Physical Therapist                   Clinical Impression       Row Name 02/06/24 1521          Pain    Pretreatment Pain Rating 0/10 - no pain  -     Posttreatment Pain Rating 0/10 - no pain  -     Pain Intervention(s) Repositioned;Ambulation/increased activity;Elevated  -     Additional Documentation Pain Scale: Numbers Pre/Post-Treatment (Group)  -       Row Name 02/06/24 1521          Plan of Care Review    Plan of Care Reviewed With patient;daughter  -     Outcome Evaluation Pt. presents below baseline function w/generalized weakness and balance deficits affecting his ability to safely participate in functional mobility. He performed bed mobility, transfers and ambulated 4' w/front wheeled walker, min assist of 2. Activity limited by instability. Pt. would benefit from IPPT to address stated deficits.  -       Row Name 02/06/24 1521          Therapy Assessment/Plan (PT)    Rehab Potential (PT) good, to achieve stated therapy goals  -     Criteria for Skilled Interventions Met (PT) yes;meets criteria;skilled treatment is necessary  -     Therapy Frequency (PT) daily  -       Row Name 02/06/24 1521          Vital Signs    Pre Systolic BP Rehab 134  -SS     Pre Treatment Diastolic BP 85  -SS     Pretreatment Heart Rate (beats/min) 52  -SS     Pre SpO2 (%) 98  -SS     O2 Delivery Pre Treatment room air  -     Pre Patient Position Supine  -       Row Name 02/06/24 1521          Positioning and Restraints    Pre-Treatment Position in bed  -     Post Treatment Position bed  -SS     In Bed notified nsg;sitting EOB;with nsg  -               User Key  (r) = Recorded By, (t) = Taken By, (c) = Cosigned By      Initials Name Provider Type    Karlee Uriarte, PT Physical Therapist                   Outcome Measures       [facility]  Name 02/06/24 1526          How much help from another person do you currently need...    Turning from your back to your side while in flat bed without using bedrails? 3  -SS     Moving from lying on back to sitting on the side of a flat bed without bedrails? 3  -SS     Moving to and from a bed to a chair (including a wheelchair)? 3  -SS     Standing up from a chair using your arms (e.g., wheelchair, bedside chair)? 3  -SS     Climbing 3-5 steps with a railing? 2  -SS     To walk in hospital room? 2  -SS     AM-PAC 6 Clicks Score (PT) 16  -SS     Highest Level of Mobility Goal 5 --> Static standing  -SS       Row Name 02/06/24 1526          Functional Assessment    Outcome Measure Options AM-PAC 6 Clicks Basic Mobility (PT)  -               User Key  (r) = Recorded By, (t) = Taken By, (c) = Cosigned By      Initials Name Provider Type     Karlee Garrett, PT Physical Therapist                                 Physical Therapy Education       Title: PT OT SLP Therapies (In Progress)       Topic: Physical Therapy (Done)       Point: Mobility training (Done)       Learning Progress Summary             Patient Eager, E, VU,DU,NR by  at 2/6/2024 1527    Comment: Educated pt. safety/technique w/bed mobility, transfers, ambulation, PT POC   Family Eager, E, VU,DU,NR by  at 2/6/2024 1527    Comment: Educated pt. safety/technique w/bed mobility, transfers, ambulation, PT POC                         Point: Home exercise program (Done)       Learning Progress Summary             Patient Eager, E, VU,DU,NR by  at 2/6/2024 1527    Comment: Educated pt. safety/technique w/bed mobility, transfers, ambulation, PT POC   Family Eager, E, VU,DU,NR by  at 2/6/2024 1527    Comment: Educated pt. safety/technique w/bed mobility, transfers, ambulation, PT POC                         Point: Body mechanics (Done)       Learning Progress Summary             Patient Eager, E, VU,DU,NR by  at 2/6/2024 1527    Comment: Educated pt.  safety/technique w/bed mobility, transfers, ambulation, PT POC   Family Heberer, E, VU,DU,NR by  at 2/6/2024 1527    Comment: Educated pt. safety/technique w/bed mobility, transfers, ambulation, PT POC                         Point: Precautions (Done)       Learning Progress Summary             Patient Eager, E, VU,DU,NR by  at 2/6/2024 1527    Comment: Educated pt. safety/technique w/bed mobility, transfers, ambulation, PT POC   Family Heberer, E, VU,DU,NR by  at 2/6/2024 1527    Comment: Educated pt. safety/technique w/bed mobility, transfers, ambulation, PT POC                                         User Key       Initials Effective Dates Name Provider Type Discipline     06/01/21 -  Karlee Garrett, PT Physical Therapist PT                  PT Recommendation and Plan  Planned Therapy Interventions (PT): balance training, bed mobility training, gait training, home exercise program, neuromuscular re-education, postural re-education, patient/family education, ROM (range of motion), strengthening, stretching, transfer training, motor coordination training  Plan of Care Reviewed With: patient, daughter  Outcome Evaluation: Pt. presents below baseline function w/generalized weakness and balance deficits affecting his ability to safely participate in functional mobility. He performed bed mobility, transfers and ambulated 4' w/front wheeled walker, min assist of 2. Activity limited by instability. Pt. would benefit from IPPT to address stated deficits.     Time Calculation:   PT Evaluation Complexity  History, PT Evaluation Complexity: 3 or more personal factors and/or comorbidities  Examination of Body Systems (PT Eval Complexity): total of 4 or more elements  Clinical Presentation (PT Evaluation Complexity): evolving  Clinical Decision Making (PT Evaluation Complexity): low complexity  Overall Complexity (PT Evaluation Complexity): low complexity     PT Charges       Row Name 02/06/24 9075             Time  Calculation    Start Time 1409  -SS      PT Received On 02/06/24  -SS      PT Goal Re-Cert Due Date 02/16/24  -SS         Untimed Charges    PT Eval/Re-eval Minutes 51  -SS         Total Minutes    Untimed Charges Total Minutes 51  -SS       Total Minutes 51  -SS                User Key  (r) = Recorded By, (t) = Taken By, (c) = Cosigned By      Initials Name Provider Type    SS Karlee Garrett, PT Physical Therapist                  Therapy Charges for Today       Code Description Service Date Service Provider Modifiers Qty    80750995744 HC PT EVAL LOW COMPLEXITY 4 2/6/2024 Karlee Garrett, PT GP 1    77716746026 HC PT THER SUPP EA 15 MIN 2/6/2024 Karlee Garrett, PT GP 2            PT G-Codes  Outcome Measure Options: AM-PAC 6 Clicks Basic Mobility (PT)  AM-PAC 6 Clicks Score (PT): 16  PT Discharge Summary  Anticipated Discharge Disposition (PT): skilled nursing facility    Karlee Garrett PT  2/6/2024

## 2024-02-06 NOTE — PLAN OF CARE
Goal Outcome Evaluation:              Outcome Evaluation: VSS. 2L NC. Pt refuse all meds last night despite nurse educating on meds, NP notified and no new orders put in. Still in 2 points restraints. Will continue plan of care.

## 2024-02-06 NOTE — CASE MANAGEMENT/SOCIAL WORK
Discharge Planning Assessment  [facility]     Patient Name: Omkar Nava  MRN: [id number]  Today's Date: 2/6/2024    Admit Date: 2/5/2024    Plan: SNF   Discharge Needs Assessment       Row Name 02/06/24 1537       Living Environment    People in Home other (see comments)  Lives at [facility]    Primary Care Provided by other (see comments)  staff at [facility]    Provides Primary Care For no one, unable/limited ability to care for self    Family Caregiver if Needed child(mable), adult    Family Caregiver Names Idalia Lerma  Daughter  [phone]    Quality of Family Relationships helpful;involved;supportive    Able to Return to Prior Arrangements yes       Transition Planning    Patient/Family Anticipates Transition to long-term care facility    Patient/Family Anticipated Services at Transition     Transportation Anticipated family or friend will provide       Discharge Needs Assessment    Equipment Currently Used at Home oxygen;respiratory supplies                   Discharge Plan       Row Name 02/06/24 3363       Plan    Plan SNF    Patient/Family in Agreement with Plan yes    Plan Comments Spoke with daughterIdalia outside Mr. Nava's room due to PT working with him. Idalia said her father lives at [facility]. She verified he has Medicare and has prescription coverage. He uses the facility pharmacy to fill his prescriptions. His PCP is Deann Cao. Prior to admission, he required assistance with all ADL's, except he's able to feed himself. Idalia said, he wears oxygen 2L/NC 24/7. He uses a w/c or rolling walker to assist with ambulation. The goal for Mr. Nava is to return to [facility] at discharge. Await therapy recommendations to determine proper discharge placement. CM will continue to follow.    Final Discharge Disposition Code 30 - still a patient                  Continued Care and Services - Admitted Since 2/5/2024    Coordination has not been started for this  encounter.       Selected Continued Care - Prior Encounters Includes continued care and service providers with selected services from prior encounters from 11/7/2023 to 2/6/2024      Discharged on 1/21/2024 Admission date: 1/15/2024 - Discharge disposition: Skilled Nursing Facility (DC - External)      Destination       Service Provider Selected Services Address Phone Fax Patient Preferred    [facility] [address] [facility] [place] [id number] [phone] --       Internal Comment last updated by Rosemarie Yarbrough, RN 1/18/2024 1150    Does not have a bed hold    1/18: Spoke with Keyla, SNF to LTC for now. Not med ready today.                                      Expected Discharge Date and Time       Expected Discharge Date Expected Discharge Time    Feb 9, 2024            Demographic Summary       Row Name 02/06/24 1534       General Information    Admission Type inpatient    Arrived From emergency department    Referral Source admission list    Reason for Consult discharge planning    Preferred Language English       Contact Information    Permission Granted to Share Info With     Contact Information Obtained for                    Functional Status       Row Name 02/06/24 1535       Functional Status    Usual Activity Tolerance fair    Current Activity Tolerance fair       Functional Status, IADL    Medications completely dependent    Meal Preparation completely dependent    Housekeeping completely dependent    Laundry completely dependent    Shopping completely dependent                   Psychosocial    No documentation.                  Abuse/Neglect    No documentation.                  Legal    No documentation.                  Substance Abuse    No documentation.                  Patient Forms    No documentation.                     Stefany Prado, RN

## 2024-02-07 LAB
GLUCOSE BLDC GLUCOMTR-MCNC: 112 MG/DL (ref 70–130)
GLUCOSE BLDC GLUCOMTR-MCNC: 119 MG/DL (ref 70–130)
GLUCOSE BLDC GLUCOMTR-MCNC: 134 MG/DL (ref 70–130)
GLUCOSE BLDC GLUCOMTR-MCNC: 190 MG/DL (ref 70–130)

## 2024-02-07 PROCEDURE — 99233 SBSQ HOSP IP/OBS HIGH 50: CPT

## 2024-02-07 PROCEDURE — 25010000002 FUROSEMIDE PER 20 MG: Performed by: STUDENT IN AN ORGANIZED HEALTH CARE EDUCATION/TRAINING PROGRAM

## 2024-02-07 PROCEDURE — 82948 REAGENT STRIP/BLOOD GLUCOSE: CPT

## 2024-02-07 PROCEDURE — 94799 UNLISTED PULMONARY SVC/PX: CPT

## 2024-02-07 PROCEDURE — 63710000001 INSULIN LISPRO (HUMAN) PER 5 UNITS: Performed by: INTERNAL MEDICINE

## 2024-02-07 PROCEDURE — 94664 DEMO&/EVAL PT USE INHALER: CPT

## 2024-02-07 PROCEDURE — 25010000002 ZIPRASIDONE MESYLATE PER 10 MG

## 2024-02-07 RX ORDER — TORSEMIDE 10 MG/1
10 TABLET ORAL DAILY
Status: DISCONTINUED | OUTPATIENT
Start: 2024-02-07 | End: 2024-02-13 | Stop reason: HOSPADM

## 2024-02-07 RX ORDER — WATER 10 ML/10ML
INJECTION INTRAMUSCULAR; INTRAVENOUS; SUBCUTANEOUS
Status: DISCONTINUED
Start: 2024-02-07 | End: 2024-02-13 | Stop reason: HOSPADM

## 2024-02-07 RX ORDER — ARIPIPRAZOLE 5 MG/1
5 TABLET ORAL DAILY
Status: DISCONTINUED | OUTPATIENT
Start: 2024-02-07 | End: 2024-02-13 | Stop reason: HOSPADM

## 2024-02-07 RX ORDER — ZIPRASIDONE MESYLATE 20 MG/ML
10 INJECTION, POWDER, LYOPHILIZED, FOR SOLUTION INTRAMUSCULAR ONCE AS NEEDED
Status: COMPLETED | OUTPATIENT
Start: 2024-02-07 | End: 2024-02-07

## 2024-02-07 RX ADMIN — HYDRALAZINE HYDROCHLORIDE 50 MG: 50 TABLET, FILM COATED ORAL at 20:35

## 2024-02-07 RX ADMIN — Medication 5 MG: at 20:36

## 2024-02-07 RX ADMIN — TRAZODONE HYDROCHLORIDE 25 MG: 50 TABLET ORAL at 20:36

## 2024-02-07 RX ADMIN — INSULIN LISPRO 2 UNITS: 100 INJECTION, SOLUTION INTRAVENOUS; SUBCUTANEOUS at 12:06

## 2024-02-07 RX ADMIN — AMLODIPINE BESYLATE 10 MG: 10 TABLET ORAL at 08:57

## 2024-02-07 RX ADMIN — Medication 10 ML: at 20:37

## 2024-02-07 RX ADMIN — SODIUM CHLORIDE 375 MG: 9 INJECTION, SOLUTION INTRAVENOUS at 13:07

## 2024-02-07 RX ADMIN — FUROSEMIDE 40 MG: 10 INJECTION, SOLUTION INTRAMUSCULAR; INTRAVENOUS at 05:46

## 2024-02-07 RX ADMIN — ZIPRASIDONE MESYLATE 10 MG: 20 INJECTION, POWDER, LYOPHILIZED, FOR SOLUTION INTRAMUSCULAR at 12:06

## 2024-02-07 RX ADMIN — ATORVASTATIN CALCIUM 20 MG: 20 TABLET, FILM COATED ORAL at 20:36

## 2024-02-07 RX ADMIN — Medication 10 ML: at 08:56

## 2024-02-07 RX ADMIN — FLUOXETINE HYDROCHLORIDE 20 MG: 20 CAPSULE ORAL at 08:56

## 2024-02-07 RX ADMIN — DIVALPROEX SODIUM 375 MG: 125 TABLET, DELAYED RELEASE ORAL at 20:36

## 2024-02-07 RX ADMIN — TIMOLOL MALEATE 1 DROP: 5 SOLUTION/ DROPS OPHTHALMIC at 08:56

## 2024-02-07 RX ADMIN — CARVEDILOL 25 MG: 12.5 TABLET, FILM COATED ORAL at 18:38

## 2024-02-07 RX ADMIN — HYDRALAZINE HYDROCHLORIDE 50 MG: 50 TABLET, FILM COATED ORAL at 08:57

## 2024-02-07 RX ADMIN — IPRATROPIUM BROMIDE AND ALBUTEROL SULFATE 3 ML: 2.5; .5 SOLUTION RESPIRATORY (INHALATION) at 10:29

## 2024-02-07 RX ADMIN — FLUOXETINE HYDROCHLORIDE 20 MG: 20 CAPSULE ORAL at 20:37

## 2024-02-07 RX ADMIN — IPRATROPIUM BROMIDE AND ALBUTEROL SULFATE 3 ML: 2.5; .5 SOLUTION RESPIRATORY (INHALATION) at 15:05

## 2024-02-07 RX ADMIN — THIAMINE HCL TAB 100 MG 100 MG: 100 TAB at 08:56

## 2024-02-07 RX ADMIN — FAMOTIDINE 20 MG: 20 TABLET, FILM COATED ORAL at 08:56

## 2024-02-07 RX ADMIN — DIVALPROEX SODIUM 375 MG: 125 TABLET, DELAYED RELEASE ORAL at 00:59

## 2024-02-07 RX ADMIN — BRIMONIDINE TARTRATE 1 DROP: 2 SOLUTION OPHTHALMIC at 08:57

## 2024-02-07 RX ADMIN — SENNOSIDES AND DOCUSATE SODIUM 2 TABLET: 8.6; 5 TABLET ORAL at 20:37

## 2024-02-07 RX ADMIN — CARVEDILOL 25 MG: 12.5 TABLET, FILM COATED ORAL at 08:57

## 2024-02-07 NOTE — PROGRESS NOTES
" [facility] Neurology    Progress Note    Patient Name: Omkar Nava  : 1955  MRN: [id number]  Primary Care Physician:  Deann Cao MD  Date of admission: 2024    Subjective     Chief Complaint: Altered mental status    History of Present Illness  Patient was resting comfortably in bed and stated that he \"did not want to be bothered today\".  Patient was educated on taking his medications and stated that he would tomorrow.  He was requiring 2-point restraints due to agitation/combativeness.  No seizure-like activity.    Review of Systems   General: Negative for fever, nausea, or vomiting.   Neurological: Negative for headache, pain, or weakness.     Objective     Physical Exam  Vitals and nursing note reviewed.   Constitutional:       General: He is not in acute distress.     Appearance: He is not ill-appearing.   Eyes:      General: No visual field deficit.     Extraocular Movements: Extraocular movements intact.      Pupils: Pupils are equal, round, and reactive to light.      Comments: No nystagmus or deviated gaze noted   Cardiovascular:      Rate and Rhythm: Normal rate.   Pulmonary:      Effort: Pulmonary effort is normal.   Neurological:      Mental Status: He is alert. Mental status is at baseline.      Cranial Nerves: No cranial nerve deficit or facial asymmetry.      Sensory: No sensory deficit.      Motor: No weakness or seizure activity.      Comments:     Cranial Nerves   CN II: Pupils are equal, round, and reactive to light. Normal visual acuity and visual fields.    CN III IV VI: Extraocular movements are full without nystagmus.  CN V: Normal facial sensation and strength of muscles of mastication.  CN VII: Facial movements are symmetric. No weakness.  CN VIII:  Auditory acuity is normal.  CN IX & X:  Symmetric palatal movement.  CN XI: Sternocleidomastoid and trapezius are normal.  No weakness.  CN XII: The tongue is midline.  No atrophy or fasciculations.    "          Vitals:   Temp:  [97.1 °F (36.2 °C)-97.8 °F (36.6 °C)] 97.8 °F (36.6 °C)  Heart Rate:  [52-63] 63  Resp:  [16-18] 16  BP: (123-198)/(48-85) 165/50  Flow (L/min):  [1-2] 1    Current Medications    Current Facility-Administered Medications:     acetaminophen (TYLENOL) tablet 650 mg, 650 mg, Oral, Q6H PRN, Sarah Gann R, DO    amLODIPine (NORVASC) tablet 10 mg, 10 mg, Oral, Q24H, Sarah Gann, DO, 10 mg at 02/06/24 1002    atorvastatin (LIPITOR) tablet 20 mg, 20 mg, Oral, Nightly, Sarah Gann, DO, 20 mg at 02/06/24 1955    sennosides-docusate (PERICOLACE) 8.6-50 MG per tablet 2 tablet, 2 tablet, Oral, BID, 2 tablet at 02/06/24 0959 **AND** polyethylene glycol (MIRALAX) packet 17 g, 17 g, Oral, Daily PRN **AND** bisacodyl (DULCOLAX) EC tablet 5 mg, 5 mg, Oral, Daily PRN **AND** bisacodyl (DULCOLAX) suppository 10 mg, 10 mg, Rectal, Daily PRN, Sarah Gann R, DO    brimonidine (ALPHAGAN) 0.2 % ophthalmic solution 1 drop, 1 drop, Both Eyes, BID, 1 drop at 02/06/24 2013 **AND** timolol (TIMOPTIC) 0.5 % ophthalmic solution 1 drop, 1 drop, Both Eyes, BID, Sarah Gann, DO, 1 drop at 02/06/24 2013    carvedilol (COREG) tablet 25 mg, 25 mg, Oral, BID With Meals, Sarah Gann, DO, 25 mg at 02/06/24 1814    dextrose (D50W) (25 g/50 mL) IV injection 25 g, 25 g, Intravenous, Q15 Min PRN, Sarah Gann R, DO    dextrose (GLUTOSE) oral gel 15 g, 15 g, Oral, Q15 Min PRN, Sarah Gann DO    divalproex (DEPAKOTE) DR tablet 375 mg, 375 mg, Oral, BID, Agnes Dye, APRTABITHA, 375 mg at 02/07/24 0059    famotidine (PEPCID) tablet 20 mg, 20 mg, Oral, Daily, Ijeoma Kuhn, PharmD, 20 mg at 02/06/24 1002    ferrous sulfate tablet 325 mg, 325 mg, Oral, Daily With Breakfast, Sarah Gann DO, 325 mg at 02/06/24 1002    FLUoxetine (PROzac) capsule 20 mg, 20 mg, Oral, BID, Sarah Gann DO, 20 mg at 02/06/24 1956    glucagon (GLUCAGEN) injection 1 mg, 1 mg, Intramuscular, Q15 Min PRN,  "Sarah Gann R, DO    hydrALAZINE (APRESOLINE) tablet 50 mg, 50 mg, Oral, TID, Sarah Gann, DO, 50 mg at 02/06/24 1956    hydrOXYzine (ATARAX) tablet 50 mg, 50 mg, Oral, Q6H PRN, Sarah Gann R, DO, 50 mg at 02/06/24 1814    Insulin Lispro (humaLOG) injection 2-7 Units, 2-7 Units, Subcutaneous, 4x Daily AC & at Bedtime, Sarah Gann R, DO, 3 Units at 02/06/24 1148    ipratropium-albuterol (DUO-NEB) nebulizer solution 3 mL, 3 mL, Nebulization, 4x Daily - RT, Sarah Gann, , 3 mL at 02/06/24 1649    melatonin tablet 5 mg, 5 mg, Oral, Nightly, Sarah Gann R, DO, 5 mg at 02/06/24 1955    ondansetron (ZOFRAN) injection 4 mg, 4 mg, Intravenous, Q6H PRN, Sarah Gann R, DO    sodium chloride 0.9 % flush 10 mL, 10 mL, Intravenous, PRN, Sarah Gann R, DO    sodium chloride 0.9 % flush 10 mL, 10 mL, Intravenous, Q12H, Sarah Gann R, DO, 10 mL at 02/06/24 1003    sodium chloride 0.9 % flush 10 mL, 10 mL, Intravenous, PRN, Sarah Gann R, DO    sodium chloride 0.9 % infusion 40 mL, 40 mL, Intravenous, PRN, Sarah Gann R, DO    thiamine (VITAMIN B-1) tablet 100 mg, 100 mg, Oral, Daily, Sarah Gann R, DO, 100 mg at 02/06/24 1001    traZODone (DESYREL) tablet 25 mg, 25 mg, Oral, Nightly, Sarah Gann, DO, 25 mg at 02/06/24 1956    Laboratory Results:   Lab Results   Component Value Date    GLUCOSE 185 (H) 02/06/2024    CALCIUM 8.9 02/06/2024     02/06/2024    K 4.9 02/06/2024    CO2 26.0 02/06/2024     02/06/2024    BUN 26 (H) 02/06/2024    CREATININE 1.43 (H) 02/06/2024    EGFRIFAFRI >60 07/25/2022    EGFRIFNONA >60 07/25/2022    BCR 18.2 02/06/2024    ANIONGAP 11.0 02/06/2024     Lab Results   Component Value Date    WBC 6.30 02/06/2024    HGB 8.1 (L) 02/06/2024    HCT 25.6 (L) 02/06/2024    MCV 93.1 02/06/2024     02/06/2024     No results found for: \"CHOL\"  Lab Results   Component Value Date    HDL 44 06/25/2022     Lab Results   Component Value Date    " .4 (H) 06/25/2022     Lab Results   Component Value Date    TRIG 58 06/25/2022     Lab Results   Component Value Date    HGBA1C 7.00 (H) 10/16/2022     Lab Results   Component Value Date    INR 1.08 10/18/2022    INR 1.02 10/14/2022    INR 1.17 (H) 09/29/2022    PROTIME 13.9 10/18/2022    PROTIME 13.3 10/14/2022    PROTIME 14.8 (H) 09/29/2022     Lab Results   Component Value Date    FOLATE 13.90 02/05/2024     Lab Results   Component Value Date    [id number] 670 02/05/2024             Assessment / Plan   Brief Patient Summary:  Omkar Nava is a 68 y.o. male with a past medical history of HFrEF, T2DM, chronic edema, CKD stage III, HTN, osteomyelitis s/p transmetatarsal amputation, dementia, HTN who presented from his living facility [facility] with complaint of altered mental status.     EEG showed no focal features or epileptic activity.  Nonspecific mild diffuse cerebral dysfunction.    CT head with no acute intracranial abnormalities.  Did show diffuse cortical atrophy with chronic microvascular changes.    Plan:   Altered mental status  Dementia  Questionable history of seizure  Continue Depakote  mg twice daily, per family this is for behavior and not seizure disorder.  One-time IV dose as patient is refusing all p.o. medications  Trough VPA level in a.m.; patient refused blood draw  Trial of Abilify 5 mg daily\  One-time dose IM Geodon 10 mg for agitation.  Continue thiamine 100 mg daily  Continue trazodone 25 mg nightly  Continue fall/delirium precautions  Continue seizure precautions  If patient has witnessed seizure-like activity load with IV Depakote 1000 mg and increase maintenance dose to 500 mg twice daily.  IV Valium as needed for seizure-like activity  Continue home Aricept 5 mg nightly  Neurochecks every shift  General neurology will continue to follow    I have discussed the above with the patient and bedside RN Dr. De Leon  Time spent with patient: 60 minutes in face-to-face  evaluation and management of the patient.    Copied text in this note has been reviewed and is accurate as of 02/07/24.     Mila Crews APRN

## 2024-02-07 NOTE — PLAN OF CARE
Goal Outcome Evaluation:  Plan of Care Reviewed With: patient, daughter        Progress: no change  Outcome Evaluation: Alert to self, NSR, RA, no c/o pain, visual and auditory hallucinations, restless and agitated at times/required one dose of Geodon, external catheter in place, refused some PO meds, in 4pt restraints, plan is to return to [facility]/all skin measures in place, bed alarm active

## 2024-02-07 NOTE — PROGRESS NOTES
[facility]  PROGRESS NOTE    Patient Name: Omkar Nava  : 1955  MRN: [id number]    Date of Admission: 2024  Primary Care Physician: Deann Cao MD    Subjective   Subjective     CC:  F/u confusion    HPI:  Patient resting in bed. In 2-point restraints. Per RN, patient appears to be hallucinating. Presently calm, NAD. Received IV Geodon x 1.      Objective   Objective     Vital Signs:   Temp:  [97.5 °F (36.4 °C)-97.8 °F (36.6 °C)] 97.8 °F (36.6 °C)  Heart Rate:  [52-67] 66  Resp:  [16-18] 16  BP: (123-169)/(48-78) 156/64  Flow (L/min):  [1] 1     Physical Exam:  Constitutional: No acute distress, awake, alert  HENT: NCAT, mucous membranes moist  Respiratory: Clear to auscultation bilaterally, respiratory effort normal   Cardiovascular: RRR, no murmurs, rubs, or gallops  Gastrointestinal: Positive bowel sounds, soft, nontender, nondistended  Musculoskeletal: No bilateral ankle edema  Psychiatric: Appropriate affect, cooperative  Neurologic: Moves all extremities, speech clear  Skin: No rashes, PVD RLE      Results Reviewed:  LAB RESULTS:      Lab 24  1144 24  1651 24  1146   WBC 6.30  --  7.84   HEMOGLOBIN 8.1*  --  7.7*   HEMATOCRIT 25.6*  --  25.0*   PLATELETS 214  --  243   NEUTROS ABS 4.50  --  5.19   IMMATURE GRANS (ABS) 0.02  --  0.02   LYMPHS ABS 1.03  --  1.74   MONOS ABS 0.52  --  0.79   EOS ABS 0.21  --  0.09   MCV 93.1  --  95.8   PROCALCITONIN  --   --  0.16   LACTATE  --  0.9 1.0         Lab 24  1144 24  1146   SODIUM 141 142   POTASSIUM 4.9 5.4*   CHLORIDE 104 107   CO2 26.0 27.0   ANION GAP 11.0 8.0   BUN 26* 30*   CREATININE 1.43* 1.80*   EGFR 54.0* 41.0*   GLUCOSE 185* 125*   CALCIUM 8.9 9.9   MAGNESIUM  --  2.1         Lab 24  1146   TOTAL PROTEIN 7.0   ALBUMIN 3.5   GLOBULIN 3.5   ALT (SGPT) 33   AST (SGOT) 21   BILIRUBIN 0.3   ALK PHOS 89         Lab 24  1651 24  1146   PROBNP  1,065.0* 1,446.0*   HSTROP T 155* 168*             Lab 02/05/24  1146   FOLATE 13.90   VITAMIN B 12 670         Brief Urine Lab Results  (Last result in the past 365 days)        Color   Clarity   Blood   Leuk Est   Nitrite   Protein   CREAT   Urine HCG        02/05/24 1146 Yellow   Clear   Negative   Negative   Negative   Negative                   Microbiology Results Abnormal       Procedure Component Value - Date/Time    Blood Culture - Blood, Arm, Left [[id number]]  (Normal) Collected: 02/05/24 1320    Lab Status: Preliminary result Specimen: Blood from Arm, Left Updated: 02/07/24 1400     Blood Culture No growth at 2 days    Blood Culture - Blood, Arm, Right [[id number]]  (Normal) Collected: 02/05/24 1320    Lab Status: Preliminary result Specimen: Blood from Arm, Right Updated: 02/07/24 1400     Blood Culture No growth at 2 days    S. Pneumo Ag Urine or CSF - Urine, Urine, Clean Catch [[id number]]  (Normal) Collected: 02/05/24 1718    Lab Status: Final result Specimen: Urine, Clean Catch Updated: 02/06/24 0101     Strep Pneumo Ag Negative    Legionella Antigen, Urine - Urine, Urine, Clean Catch [[id number]]  (Normal) Collected: 02/05/24 1718    Lab Status: Final result Specimen: Urine, Clean Catch Updated: 02/06/24 0101     LEGIONELLA ANTIGEN, URINE Negative    Respiratory Panel PCR w/COVID-19(SARS-CoV-2) GIANNI/SIENNA/ANNA/PAD/COR/ASHIA In-House, NP Swab in UTM/VTM, 2 HR TAT - Swab, Nasopharynx [[id number]]  (Normal) Collected: 02/05/24 1720    Lab Status: Final result Specimen: Swab from Nasopharynx Updated: 02/05/24 1810     ADENOVIRUS, PCR Not Detected     Coronavirus 229E Not Detected     Coronavirus HKU1 Not Detected     Coronavirus NL63 Not Detected     Coronavirus OC43 Not Detected     COVID19 Not Detected     Human Metapneumovirus Not Detected     Human Rhinovirus/Enterovirus Not Detected     Influenza A PCR Not Detected     Influenza B PCR Not Detected     Parainfluenza Virus 1 Not Detected     Parainfluenza  Virus 2 Not Detected     Parainfluenza Virus 3 Not Detected     Parainfluenza Virus 4 Not Detected     RSV, PCR Not Detected     Bordetella pertussis pcr Not Detected     Bordetella parapertussis PCR Not Detected     Chlamydophila pneumoniae PCR Not Detected     Mycoplasma pneumo by PCR Not Detected    Narrative:      In the setting of a positive respiratory panel with a viral infection PLUS a negative procalcitonin without other underlying concern for bacterial infection, consider observing off antibiotics or discontinuation of antibiotics and continue supportive care. If the respiratory panel is positive for atypical bacterial infection (Bordetella pertussis, Chlamydophila pneumoniae, or Mycoplasma pneumoniae), consider antibiotic de-escalation to target atypical bacterial infection.    COVID PRE-OP / PRE-PROCEDURE SCREENING ORDER (NO ISOLATION) - Swab, Nasopharynx [[id number]]  (Normal) Collected: 02/05/24 1302    Lab Status: Final result Specimen: Swab from Nasopharynx Updated: 02/05/24 1430    Narrative:      The following orders were created for panel order COVID PRE-OP / PRE-PROCEDURE SCREENING ORDER (NO ISOLATION) - Swab, Nasopharynx.  Procedure                               Abnormality         Status                     ---------                               -----------         ------                     COVID-19 and FLU A/B PCR...[[id number]]  Normal              Final result                 Please view results for these tests on the individual orders.    COVID-19 and FLU A/B PCR, 1 HR TAT - Swab, Nasopharynx [[id number]]  (Normal) Collected: 02/05/24 1302    Lab Status: Final result Specimen: Swab from Nasopharynx Updated: 02/05/24 1430     COVID19 Not Detected     Influenza A PCR Not Detected     Influenza B PCR Not Detected    Narrative:      Fact sheet for providers: https://www.fda.gov/media/304699/download    Fact sheet for patients: https://www.fda.gov/media/352917/download    Test performed by PCR.             FL Video Swallow With Speech Single Contrast    Result Date: 2/6/2024  FL VIDEO SWALLOW W SPEECH SINGLE-CONTRAST Date of Exam: 2/6/2024 10:52 AM EST Indication: dysphagia. Comparison: None available. Technique:   The speech pathologist administered food and/or liquid mixed with barium to the patient with cine/video imaging.  Imaging assistance was provided to the speech pathologist and an image was saved. Fluoroscopic Time: 1 minute 18 seconds Number of Images: 6 cine loops Findings: The patient was evaluated in the seated lateral position while taking a variety of consistencies by mouth under the direction of speech pathology. Please see the speech pathologist's procedure report for full details and recommendations. Near the end of the study, there was trace aspiration while taking multiple drinks of thin liquids from a straw. No aspiration was seen with the remaining swallowing attempts.     Impression: Impression: Fluoroscopy provided during modified barium swallow. Electronically Signed: Wisam Oquendo MD  2/6/2024 11:43 AM EST  Workstation ID: [id number]    EEG    Result Date: 2/5/2024  Reason for referral: 68 y.o.male with altered mental status, consideration of seizures Technical Summary:  A 19 channel digital EEG was performed using the international 10-20 placement system, including eye leads and EKG leads. Duration: 20 minutes Findings: The patient is awake and resting comfortably in bed.  The background shows diffuse medium amplitude 5 to 6 Hz theta which is present symmetrically over both hemispheres.  A clear posterior rhythm is not seen.  EMG artifact is variably prominent over the temporal leads.  Drowsiness is seen with mild slowing of the tracing but stage II sleep is not seen.  No focal features are present.  No epileptiform activity is seen.  Photic stimulation does not change the background.  Hyperventilation is not performed. Video: Available Technical quality: Superior EKG: Irregular,  50-80 bpm SUMMARY: Mild generalized slow No focal features or epileptiform activity are seen     Impression: Diffuse cerebral dysfunction of mild degree, nonspecific.  This finding is most commonly seen with encephalopathy due to toxic/metabolic cause No evidence for epilepsy is seen This report is transcribed using the Dragon dictation system.      CT Chest Without Contrast Diagnostic    Result Date: 2/5/2024  CT CHEST WO CONTRAST DIAGNOSTIC Date of Exam: 2/5/2024 2:25 PM EST Indication: Pneumonia, complication suspected, xray done. Comparison: Plain film of earlier today. CT chest of 5/6/2017.. Technique: Axial CT images were obtained of the chest without contrast administration.  Reconstructed coronal and sagittal images were also obtained. Automated exposure control and iterative construction methods were used. Findings: There is moderate cardiomegaly. There is a very small pericardial effusion. There are small bilateral pleural effusions. There is artifact from the patient's arms. There are multiple mediastinal nodes. A precarinal node measures 15 mm in short axis. An AP window node measures 13 mm in short axis. There is mild subcarinal adenopathy. There is pulmonary vascular congestion. There are groundglass infiltrates in the bilateral upper and lower lobes. There is compressive atelectasis in the lower lobes.     Impression: Impression: Findings are compatible with CHF with moderate degree of pulmonary edema. Small effusions. Mild nonspecific mediastinal adenopathy. Electronically Signed: Jennifer Bo MD  2/5/2024 2:49 PM EST  Workstation ID: [id number]     Results for orders placed during the hospital encounter of 01/15/24    Adult Transthoracic Echo Complete With Contrast if Necessary Per Protocol    Interpretation Summary    Left ventricular ejection fraction appears to be 56 - 60%.    Left ventricular wall thickness is consistent with hypertrophy.    Estimated right ventricular systolic pressure from  tricuspid regurgitation is mildly elevated (35-45 mmHg).    There is a small (1-2cm) pericardial effusion.    No evidence for pericardial tamponade      Current medications:  Scheduled Meds:amLODIPine, 10 mg, Oral, Q24H  ARIPiprazole, 5 mg, Oral, Daily  atorvastatin, 20 mg, Oral, Nightly  brimonidine, 1 drop, Both Eyes, BID   And  timolol, 1 drop, Both Eyes, BID  carvedilol, 25 mg, Oral, BID With Meals  divalproex, 375 mg, Oral, BID  famotidine, 20 mg, Oral, Daily  ferrous sulfate, 325 mg, Oral, Daily With Breakfast  FLUoxetine, 20 mg, Oral, BID  hydrALAZINE, 50 mg, Oral, TID  insulin lispro, 2-7 Units, Subcutaneous, 4x Daily AC & at Bedtime  ipratropium-albuterol, 3 mL, Nebulization, 4x Daily - RT  melatonin, 5 mg, Oral, Nightly  senna-docusate sodium, 2 tablet, Oral, BID  sodium chloride, 10 mL, Intravenous, Q12H  sterile water (preservative free), , ,   thiamine, 100 mg, Oral, Daily  torsemide, 10 mg, Oral, Daily  traZODone, 25 mg, Oral, Nightly      Continuous Infusions:   PRN Meds:.  acetaminophen    senna-docusate sodium **AND** polyethylene glycol **AND** bisacodyl **AND** bisacodyl    dextrose    dextrose    glucagon (human recombinant)    hydrOXYzine    ondansetron    sodium chloride    sodium chloride    sodium chloride    sterile water (preservative free)    Assessment & Plan   Assessment & Plan     Active Hospital Problems    Diagnosis  POA    **Encephalopathy [G93.40]  Yes    Bilateral pneumonia [J18.9]  Yes    Acute respiratory failure with hypoxia [J96.01]  Yes    Chronic kidney disease [N18.9]  Yes    Dementia [F03.90]  Yes    Anemia of chronic disease [D63.8]  Yes    Essential hypertension [I10]  Yes    Type 2 diabetes mellitus [E11.9]  Yes      Resolved Hospital Problems   No resolved problems to display.        Brief Hospital Course to date:  Omkar Nava is a 68 y.o. male with PMHx significant for HFrEF, DMII, chronic anemia, CKDIII, HTN, Hx of Osteomyelitis s/p transmetatarsal amputation,  dementia, HTN, recent admission for PNA, heart failure exacerbation, and JAYY, who is current resident of [facility] who presented with AMS and hypoxia. CT chest was most consistent with volume overload. Patient was given IV diuresis then transitioned to PO. Patient had a tongue laceration on admission and there was concern for seizure. Neurology was consulted.      This patient's problems and plans were partially entered by my partner and updated as appropriate by me 02/07/24.       AMS  H/o dementia with psychosis  -no prior hx of seizures, does take Depakote, however family states it is not for seizures  -EEG negative for seizures  -s/p load w/depakote 1.5g IV x 1 per Neurology  -Continue Depakote DR 375mg twice daily for behavior  -Continue trazadone nightly, hydroxyzine prn  -Neurology following  -risk for hospital delirium given dementia  -s/p Geodon x 1 2/7 for increased agitation, now in restraints, trial of Abilify per neuro  -AM CMP, CBC     Acute on Chronic Hypoxic Respiratory Failure  Possible Exacerbation HFpEF  - baseline O2 is 2L  - was on 3L, 78% on room air on admission  - recent admission for pneumonia w/positive MRSA PCR, discharged on linezolid 1/21/24  - Still MRSA positive, procal wnl, proBNP 1065  - CT chest most consistent with volume overload  - s/p lasix 60mg IV on 2/5   Echo done on 1/16/25 with EF 56-60%  - Vanc/cefepime discontinued  - SLP evaluation, s/p MBS that showed moderate oral and mild pharyngeal dysphagia  - modified diet  - cards consulted per family request, evaluted on 2/6 and signed off, recommendation for continued diuresis with change to PO diuretic on 2/7 and outpatient follow-up  - resume home torsemide 10 mg daily 2/7    JAYY on CKDIII, suspect cardiorenal syndrome  - baseline around 1.3, was 1.8 on admission  - avoid nephrotoxins  - creatinine trending down  - AM CMP     Elevated Troponin, likely demand in setting of above  - EKG appears unchanged from prior, suspect  due to above, although he is unable to say if he has chest pain or not  - troponin downtrending     HTN  - continue coreg, norvasc, hydralazine     DMII  - SSI coverage for now       Expected Discharge Location and Transportation: [facility], EMS  Expected Discharge   Expected Discharge Date: 2/10/2024; Expected Discharge Time:      DVT prophylaxis:  Mechanical DVT prophylaxis orders are present.         AM-PAC 6 Clicks Score (PT): 16 (02/07/24 0800)    CODE STATUS:   Code Status and Medical Interventions:   Ordered at: 02/05/24 1326     Level Of Support Discussed With:    Health Care Surrogate     Code Status (Patient has no pulse and is not breathing):    CPR (Attempt to Resuscitate)     Medical Interventions (Patient has pulse or is breathing):    Full Support       Kari Teague, APRN  02/07/24

## 2024-02-07 NOTE — CASE MANAGEMENT/SOCIAL WORK
Continued Stay Note  [facility]     Patient Name: Omkar Nava  MRN: [id number]  Today's Date: 2/7/2024    Admit Date: 2/5/2024    Plan: SNF   Discharge Plan       Row Name 02/07/24 1455       Plan    Plan SNF    Patient/Family in Agreement with Plan yes    Plan Comments Discussed in MDR. Mr. Nava is not medically ready for discharge today. When he is discharged, he can return to [facility]. CM will continue to follow.    Final Discharge Disposition Code 30 - still a patient                   Discharge Codes    No documentation.                 Expected Discharge Date and Time       Expected Discharge Date Expected Discharge Time    Feb 10, 2024               Stefany Prado RN

## 2024-02-08 LAB
ALBUMIN SERPL-MCNC: 3.4 G/DL (ref 3.5–5.2)
ALBUMIN/GLOB SERPL: 1.1 G/DL
ALP SERPL-CCNC: 81 U/L (ref 39–117)
ALT SERPL W P-5'-P-CCNC: 26 U/L (ref 1–41)
ANION GAP SERPL CALCULATED.3IONS-SCNC: 12 MMOL/L (ref 5–15)
AST SERPL-CCNC: 22 U/L (ref 1–40)
BILIRUB SERPL-MCNC: 0.2 MG/DL (ref 0–1.2)
BUN SERPL-MCNC: 25 MG/DL (ref 8–23)
BUN/CREAT SERPL: 16.3 (ref 7–25)
CALCIUM SPEC-SCNC: 8.8 MG/DL (ref 8.6–10.5)
CHLORIDE SERPL-SCNC: 103 MMOL/L (ref 98–107)
CO2 SERPL-SCNC: 25 MMOL/L (ref 22–29)
CREAT SERPL-MCNC: 1.53 MG/DL (ref 0.76–1.27)
DEPRECATED RDW RBC AUTO: 45.8 FL (ref 37–54)
EGFRCR SERPLBLD CKD-EPI 2021: 49.8 ML/MIN/1.73
ERYTHROCYTE [DISTWIDTH] IN BLOOD BY AUTOMATED COUNT: 14.4 % (ref 12.3–15.4)
GLOBULIN UR ELPH-MCNC: 3 GM/DL
GLUCOSE BLDC GLUCOMTR-MCNC: 124 MG/DL (ref 70–130)
GLUCOSE BLDC GLUCOMTR-MCNC: 137 MG/DL (ref 70–130)
GLUCOSE BLDC GLUCOMTR-MCNC: 171 MG/DL (ref 70–130)
GLUCOSE BLDC GLUCOMTR-MCNC: 98 MG/DL (ref 70–130)
GLUCOSE SERPL-MCNC: 89 MG/DL (ref 65–99)
HCT VFR BLD AUTO: 25.8 % (ref 37.5–51)
HGB BLD-MCNC: 8.6 G/DL (ref 13–17.7)
MCH RBC QN AUTO: 29.2 PG (ref 26.6–33)
MCHC RBC AUTO-ENTMCNC: 33.3 G/DL (ref 31.5–35.7)
MCV RBC AUTO: 87.5 FL (ref 79–97)
PLATELET # BLD AUTO: 271 10*3/MM3 (ref 140–450)
PMV BLD AUTO: 11.3 FL (ref 6–12)
POTASSIUM SERPL-SCNC: 4.5 MMOL/L (ref 3.5–5.2)
PROT SERPL-MCNC: 6.4 G/DL (ref 6–8.5)
RBC # BLD AUTO: 2.95 10*6/MM3 (ref 4.14–5.8)
SODIUM SERPL-SCNC: 140 MMOL/L (ref 136–145)
WBC NRBC COR # BLD AUTO: 4.33 10*3/MM3 (ref 3.4–10.8)

## 2024-02-08 PROCEDURE — 97166 OT EVAL MOD COMPLEX 45 MIN: CPT

## 2024-02-08 PROCEDURE — 99233 SBSQ HOSP IP/OBS HIGH 50: CPT

## 2024-02-08 PROCEDURE — 99232 SBSQ HOSP IP/OBS MODERATE 35: CPT

## 2024-02-08 PROCEDURE — 92526 ORAL FUNCTION THERAPY: CPT | Performed by: SPEECH-LANGUAGE PATHOLOGIST

## 2024-02-08 PROCEDURE — 94799 UNLISTED PULMONARY SVC/PX: CPT

## 2024-02-08 PROCEDURE — 82948 REAGENT STRIP/BLOOD GLUCOSE: CPT

## 2024-02-08 PROCEDURE — 94664 DEMO&/EVAL PT USE INHALER: CPT

## 2024-02-08 PROCEDURE — 85027 COMPLETE CBC AUTOMATED: CPT | Performed by: NURSE PRACTITIONER

## 2024-02-08 PROCEDURE — 97116 GAIT TRAINING THERAPY: CPT

## 2024-02-08 PROCEDURE — 97530 THERAPEUTIC ACTIVITIES: CPT

## 2024-02-08 PROCEDURE — 63710000001 INSULIN LISPRO (HUMAN) PER 5 UNITS: Performed by: INTERNAL MEDICINE

## 2024-02-08 PROCEDURE — 80053 COMPREHEN METABOLIC PANEL: CPT | Performed by: NURSE PRACTITIONER

## 2024-02-08 RX ORDER — VALPROIC ACID 250 MG/5ML
375 SOLUTION ORAL EVERY 12 HOURS SCHEDULED
Status: DISCONTINUED | OUTPATIENT
Start: 2024-02-08 | End: 2024-02-13 | Stop reason: HOSPADM

## 2024-02-08 RX ORDER — SODIUM CHLORIDE, SODIUM LACTATE, POTASSIUM CHLORIDE, CALCIUM CHLORIDE 600; 310; 30; 20 MG/100ML; MG/100ML; MG/100ML; MG/100ML
50 INJECTION, SOLUTION INTRAVENOUS CONTINUOUS
Status: DISCONTINUED | OUTPATIENT
Start: 2024-02-08 | End: 2024-02-08

## 2024-02-08 RX ORDER — DIVALPROEX SODIUM 125 MG/1
375 CAPSULE, COATED PELLETS ORAL EVERY 12 HOURS SCHEDULED
Status: DISCONTINUED | OUTPATIENT
Start: 2024-02-08 | End: 2024-02-08

## 2024-02-08 RX ORDER — IPRATROPIUM BROMIDE AND ALBUTEROL SULFATE 2.5; .5 MG/3ML; MG/3ML
3 SOLUTION RESPIRATORY (INHALATION) EVERY 4 HOURS PRN
Status: DISCONTINUED | OUTPATIENT
Start: 2024-02-08 | End: 2024-02-13 | Stop reason: HOSPADM

## 2024-02-08 RX ORDER — QUETIAPINE FUMARATE 25 MG/1
12.5 TABLET, FILM COATED ORAL DAILY
Status: DISCONTINUED | OUTPATIENT
Start: 2024-02-08 | End: 2024-02-09

## 2024-02-08 RX ADMIN — ATORVASTATIN CALCIUM 20 MG: 20 TABLET, FILM COATED ORAL at 20:34

## 2024-02-08 RX ADMIN — Medication 10 ML: at 08:16

## 2024-02-08 RX ADMIN — FLUOXETINE HYDROCHLORIDE 20 MG: 20 CAPSULE ORAL at 20:34

## 2024-02-08 RX ADMIN — Medication 10 ML: at 20:34

## 2024-02-08 RX ADMIN — QUETIAPINE FUMARATE 12.5 MG: 25 TABLET ORAL at 14:17

## 2024-02-08 RX ADMIN — BRIMONIDINE TARTRATE 1 DROP: 2 SOLUTION OPHTHALMIC at 20:34

## 2024-02-08 RX ADMIN — Medication 5 MG: at 20:34

## 2024-02-08 RX ADMIN — IPRATROPIUM BROMIDE AND ALBUTEROL SULFATE 3 ML: 2.5; .5 SOLUTION RESPIRATORY (INHALATION) at 17:20

## 2024-02-08 RX ADMIN — BRIMONIDINE TARTRATE 1 DROP: 2 SOLUTION OPHTHALMIC at 08:22

## 2024-02-08 RX ADMIN — VALPROIC ACID 375 MG: 250 SOLUTION ORAL at 21:45

## 2024-02-08 RX ADMIN — THIAMINE HCL TAB 100 MG 100 MG: 100 TAB at 08:12

## 2024-02-08 RX ADMIN — FLUOXETINE HYDROCHLORIDE 20 MG: 20 CAPSULE ORAL at 08:12

## 2024-02-08 RX ADMIN — TIMOLOL MALEATE 1 DROP: 5 SOLUTION/ DROPS OPHTHALMIC at 20:34

## 2024-02-08 RX ADMIN — IPRATROPIUM BROMIDE AND ALBUTEROL SULFATE 3 ML: 2.5; .5 SOLUTION RESPIRATORY (INHALATION) at 13:06

## 2024-02-08 RX ADMIN — FERROUS SULFATE TAB 325 MG (65 MG ELEMENTAL FE) 325 MG: 325 (65 FE) TAB at 08:12

## 2024-02-08 RX ADMIN — ARIPIPRAZOLE 5 MG: 5 TABLET ORAL at 08:13

## 2024-02-08 RX ADMIN — INSULIN LISPRO 2 UNITS: 100 INJECTION, SOLUTION INTRAVENOUS; SUBCUTANEOUS at 13:37

## 2024-02-08 RX ADMIN — TIMOLOL MALEATE 1 DROP: 5 SOLUTION/ DROPS OPHTHALMIC at 08:22

## 2024-02-08 RX ADMIN — TRAZODONE HYDROCHLORIDE 25 MG: 50 TABLET ORAL at 20:33

## 2024-02-08 NOTE — PROGRESS NOTES
"    [facility] Medicine Services  PROGRESS NOTE    Patient Name: Omkar Nava  : 1955  MRN: [id number]    Date of Admission: 2024  Primary Care Physician: Deann Cao MD    Subjective   Subjective     CC:  F/u confusion    HPI:   Patient resting in bed, sleeping, awakes to voice. Calm and NAD. Answers \"yes\" when asked if he is comfortable. In 2-point restraints. Per RN, patient is calmer today. Discussed goal to get him out of restraints as soon as possible.      Objective   Objective     Vital Signs:   Temp:  [97.8 °F (36.6 °C)-98.9 °F (37.2 °C)] 98.9 °F (37.2 °C)  Heart Rate:  [57-71] 57  Resp:  [16-19] 18  BP: (104-169)/(46-89) 131/55     Physical Exam:   Constitutional: No acute distress, sleeping, awakes to voice  HENT: NCAT, mucous membranes moist  Respiratory: Clear to auscultation bilaterally, respiratory effort normal, room air  Cardiovascular: RRR, no murmurs, rubs, or gallops  Gastrointestinal: Positive bowel sounds, soft, nontender, nondistended  Musculoskeletal: No bilateral ankle edema  Psychiatric: Cooperative  Neurologic: Moves all extremities, speech clear  Skin: No rashes, PVD RLE      Results Reviewed:  LAB RESULTS:      Lab 24  0638 24  1144 24  1651 24  1146   WBC 4.33 6.30  --  7.84   HEMOGLOBIN 8.6* 8.1*  --  7.7*   HEMATOCRIT 25.8* 25.6*  --  25.0*   PLATELETS 271 214  --  243   NEUTROS ABS  --  4.50  --  5.19   IMMATURE GRANS (ABS)  --  0.02  --  0.02   LYMPHS ABS  --  1.03  --  1.74   MONOS ABS  --  0.52  --  0.79   EOS ABS  --  0.21  --  0.09   MCV 87.5 93.1  --  95.8   PROCALCITONIN  --   --   --  0.16   LACTATE  --   --  0.9 1.0         Lab 24  0638 24  1144 24  1146   SODIUM 140 141 142   POTASSIUM 4.5 4.9 5.4*   CHLORIDE 103 104 107   CO2 25.0 26.0 27.0   ANION GAP 12.0 11.0 8.0   BUN 25* 26* 30*   CREATININE 1.53* 1.43* 1.80*   EGFR 49.8* 54.0* 41.0*   GLUCOSE 89 185* 125*   CALCIUM 8.8 8.9 9.9 "   MAGNESIUM  --   --  2.1         Lab 02/08/24  0638 02/05/24  1146   TOTAL PROTEIN 6.4 7.0   ALBUMIN 3.4* 3.5   GLOBULIN 3.0 3.5   ALT (SGPT) 26 33   AST (SGOT) 22 21   BILIRUBIN 0.2 0.3   ALK PHOS 81 89         Lab 02/05/24  1651 02/05/24  1146   PROBNP 1,065.0* 1,446.0*   HSTROP T 155* 168*             Lab 02/05/24  1146   FOLATE 13.90   VITAMIN B 12 670         Brief Urine Lab Results  (Last result in the past 365 days)        Color   Clarity   Blood   Leuk Est   Nitrite   Protein   CREAT   Urine HCG        02/05/24 1146 Yellow   Clear   Negative   Negative   Negative   Negative                   Microbiology Results Abnormal       Procedure Component Value - Date/Time    Blood Culture - Blood, Arm, Left [[id number]]  (Normal) Collected: 02/05/24 1320    Lab Status: Preliminary result Specimen: Blood from Arm, Left Updated: 02/07/24 1400     Blood Culture No growth at 2 days    Blood Culture - Blood, Arm, Right [[id number]]  (Normal) Collected: 02/05/24 1320    Lab Status: Preliminary result Specimen: Blood from Arm, Right Updated: 02/07/24 1400     Blood Culture No growth at 2 days    S. Pneumo Ag Urine or CSF - Urine, Urine, Clean Catch [[id number]]  (Normal) Collected: 02/05/24 1718    Lab Status: Final result Specimen: Urine, Clean Catch Updated: 02/06/24 0101     Strep Pneumo Ag Negative    Legionella Antigen, Urine - Urine, Urine, Clean Catch [[id number]]  (Normal) Collected: 02/05/24 1718    Lab Status: Final result Specimen: Urine, Clean Catch Updated: 02/06/24 0101     LEGIONELLA ANTIGEN, URINE Negative    Respiratory Panel PCR w/COVID-19(SARS-CoV-2) GIANNI/SIENNA/ANNA/PAD/COR/ASHIA In-House, NP Swab in UTM/VTM, 2 HR TAT - Swab, Nasopharynx [[id number]]  (Normal) Collected: 02/05/24 1720    Lab Status: Final result Specimen: Swab from Nasopharynx Updated: 02/05/24 1810     ADENOVIRUS, PCR Not Detected     Coronavirus 229E Not Detected     Coronavirus HKU1 Not Detected     Coronavirus NL63 Not Detected     Coronavirus  OC43 Not Detected     COVID19 Not Detected     Human Metapneumovirus Not Detected     Human Rhinovirus/Enterovirus Not Detected     Influenza A PCR Not Detected     Influenza B PCR Not Detected     Parainfluenza Virus 1 Not Detected     Parainfluenza Virus 2 Not Detected     Parainfluenza Virus 3 Not Detected     Parainfluenza Virus 4 Not Detected     RSV, PCR Not Detected     Bordetella pertussis pcr Not Detected     Bordetella parapertussis PCR Not Detected     Chlamydophila pneumoniae PCR Not Detected     Mycoplasma pneumo by PCR Not Detected    Narrative:      In the setting of a positive respiratory panel with a viral infection PLUS a negative procalcitonin without other underlying concern for bacterial infection, consider observing off antibiotics or discontinuation of antibiotics and continue supportive care. If the respiratory panel is positive for atypical bacterial infection (Bordetella pertussis, Chlamydophila pneumoniae, or Mycoplasma pneumoniae), consider antibiotic de-escalation to target atypical bacterial infection.    COVID PRE-OP / PRE-PROCEDURE SCREENING ORDER (NO ISOLATION) - Swab, Nasopharynx [[id number]]  (Normal) Collected: 02/05/24 1302    Lab Status: Final result Specimen: Swab from Nasopharynx Updated: 02/05/24 1430    Narrative:      The following orders were created for panel order COVID PRE-OP / PRE-PROCEDURE SCREENING ORDER (NO ISOLATION) - Swab, Nasopharynx.  Procedure                               Abnormality         Status                     ---------                               -----------         ------                     COVID-19 and FLU A/B PCR...[[id number]]  Normal              Final result                 Please view results for these tests on the individual orders.    COVID-19 and FLU A/B PCR, 1 HR TAT - Swab, Nasopharynx [[id number]]  (Normal) Collected: 02/05/24 1302    Lab Status: Final result Specimen: Swab from Nasopharynx Updated: 02/05/24 1430     COVID19 Not Detected      Influenza A PCR Not Detected     Influenza B PCR Not Detected    Narrative:      Fact sheet for providers: https://www.fda.gov/media/095704/download    Fact sheet for patients: https://www.fda.gov/media/705583/download    Test performed by PCR.            FL Video Swallow With Speech Single Contrast    Result Date: 2/6/2024  FL VIDEO SWALLOW W SPEECH SINGLE-CONTRAST Date of Exam: 2/6/2024 10:52 AM EST Indication: dysphagia. Comparison: None available. Technique:   The speech pathologist administered food and/or liquid mixed with barium to the patient with cine/video imaging.  Imaging assistance was provided to the speech pathologist and an image was saved. Fluoroscopic Time: 1 minute 18 seconds Number of Images: 6 cine loops Findings: The patient was evaluated in the seated lateral position while taking a variety of consistencies by mouth under the direction of speech pathology. Please see the speech pathologist's procedure report for full details and recommendations. Near the end of the study, there was trace aspiration while taking multiple drinks of thin liquids from a straw. No aspiration was seen with the remaining swallowing attempts.     Impression: Impression: Fluoroscopy provided during modified barium swallow. Electronically Signed: Wisam Oquendo MD  2/6/2024 11:43 AM EST  Workstation ID: [id number]     Results for orders placed during the hospital encounter of 01/15/24    Adult Transthoracic Echo Complete With Contrast if Necessary Per Protocol    Interpretation Summary    Left ventricular ejection fraction appears to be 56 - 60%.    Left ventricular wall thickness is consistent with hypertrophy.    Estimated right ventricular systolic pressure from tricuspid regurgitation is mildly elevated (35-45 mmHg).    There is a small (1-2cm) pericardial effusion.    No evidence for pericardial tamponade      Current medications:  Scheduled Meds:amLODIPine, 10 mg, Oral, Q24H  ARIPiprazole, 5 mg, Oral,  Daily  atorvastatin, 20 mg, Oral, Nightly  brimonidine, 1 drop, Both Eyes, BID   And  timolol, 1 drop, Both Eyes, BID  carvedilol, 25 mg, Oral, BID With Meals  divalproex, 375 mg, Oral, BID  famotidine, 20 mg, Oral, Daily  ferrous sulfate, 325 mg, Oral, Daily With Breakfast  FLUoxetine, 20 mg, Oral, BID  hydrALAZINE, 50 mg, Oral, TID  insulin lispro, 2-7 Units, Subcutaneous, 4x Daily AC & at Bedtime  ipratropium-albuterol, 3 mL, Nebulization, 4x Daily - RT  melatonin, 5 mg, Oral, Nightly  senna-docusate sodium, 2 tablet, Oral, BID  sodium chloride, 10 mL, Intravenous, Q12H  sterile water (preservative free), , ,   thiamine, 100 mg, Oral, Daily  torsemide, 10 mg, Oral, Daily  traZODone, 25 mg, Oral, Nightly      Continuous Infusions:   PRN Meds:.  acetaminophen    senna-docusate sodium **AND** polyethylene glycol **AND** bisacodyl **AND** bisacodyl    dextrose    dextrose    glucagon (human recombinant)    hydrOXYzine    ondansetron    sodium chloride    sodium chloride    sodium chloride    sterile water (preservative free)    Assessment & Plan   Assessment & Plan     Active Hospital Problems    Diagnosis  POA    **Encephalopathy [G93.40]  Yes    Bilateral pneumonia [J18.9]  Yes    Acute respiratory failure with hypoxia [J96.01]  Yes    Chronic kidney disease [N18.9]  Yes    Dementia [F03.90]  Yes    Anemia of chronic disease [D63.8]  Yes    Essential hypertension [I10]  Yes    Type 2 diabetes mellitus [E11.9]  Yes      Resolved Hospital Problems   No resolved problems to display.        Brief Hospital Course to date:  Omkar Nava is a 68 y.o. male with PMHx significant for HFrEF, DMII, chronic anemia, CKDIII, HTN, Hx of Osteomyelitis s/p transmetatarsal amputation, dementia, HTN, recent admission for PNA, heart failure exacerbation, and JAYY, who is current resident of [facility] who presented with AMS and hypoxia. CT chest was most consistent with volume overload. Patient was given IV diuresis then  transitioned to PO. Patient had a tongue laceration on admission and there was concern for seizure. Neurology was consulted.      This patient's problems and plans were partially entered by my partner and updated as appropriate by me 02/08/24.       AMS  H/o dementia with psychosis  -no prior hx of seizures, does take Depakote, however family states it is not for seizures  -EEG negative for seizures  -s/p load w/depakote 1.5g IV x 1 per Neurology  -Continue Depakote DR 375mg twice daily for behavior  -Continue trazadone nightly, hydroxyzine prn  -Neurology following  -risk for hospital delirium given dementia  -s/p Geodon x 1 2/7 for increased agitation, now in restraints, trial of Abilify daily  -Seroquel BID added 2/8, restlessless improving       Acute on Chronic Hypoxic Respiratory Failure  Possible Exacerbation HFpEF  - baseline O2 is 2L  - was on 3L, 78% on room air on admission  - recent admission for pneumonia w/positive MRSA PCR, discharged on linezolid 1/21/24  - Still MRSA positive, procal wnl, proBNP 1065  - Vanc/cefepime discontinued  - CT chest most consistent with volume overload  - s/p lasix 60mg IV on 2/5  - Echo done on 1/16/25 with EF 56-60%  - SLP evaluation, s/p MBS that showed moderate oral and mild pharyngeal dysphagia  - modified diet  - cards consulted per family request, evaluted on 2/6 and signed off, recommendation for continued diuresis with change to PO diuretic on 2/7 and outpatient follow-up, signed off 2/8  - resume home torsemide 10 mg daily 2/7    AJYY on CKDIII, suspect cardiorenal syndrome  - baseline around 1.3, was 1.8 on admission  - avoid nephrotoxins  - creatinine trending up, encourage PO intake and continue to monitor  - AM BMP     Elevated Troponin, likely demand in setting of above  - EKG appears unchanged from prior, suspect due to above, although he is unable to say if he has chest pain or not  - troponin downtrending     HTN  - continue coreg, norvasc, hydralazine      DMII  - SSI coverage for now       Expected Discharge Location and Transportation: [facility], EMS  Expected Discharge   Expected Discharge Date: 2/10/2024; Expected Discharge Time:      DVT prophylaxis:  Mechanical DVT prophylaxis orders are present.         AM-PAC 6 Clicks Score (PT): 16 (02/07/24 2000)    CODE STATUS:   Code Status and Medical Interventions:   Ordered at: 02/05/24 1326     Level Of Support Discussed With:    Health Care Surrogate     Code Status (Patient has no pulse and is not breathing):    CPR (Attempt to Resuscitate)     Medical Interventions (Patient has pulse or is breathing):    Full Support       Kari Teague, APRN  02/08/24

## 2024-02-08 NOTE — PROGRESS NOTES
"                  Clinical Nutrition   Nutrition Support Assessment  Reason for Visit: Identified at risk by screening criteria      Patient Name: Omkar Nava  YOB: 1955  MRN: [id number]  Date of Encounter: 02/08/24 17:28 EST  Admission date: 2/5/2024    Comments: Pt slumbering at time of visit. See for nutrition exam as feasible.    Nutrition Assessment   Admission Diagnosis:  Encephalopathy [G93.40]      Problem List:    Encephalopathy    Type 2 diabetes mellitus    Essential hypertension    Anemia of chronic disease    Chronic kidney disease    Dementia    Bilateral pneumonia    Acute respiratory failure with hypoxia        PMH:   He  has a past medical history of Anemia, Dementia, Diabetes mellitus, Dysphagia, GERD (gastroesophageal reflux disease), History of alcohol abuse, History of cocaine use, History of marijuana use, Hypertension, Osteomyelitis, Poor historian, and Visual impairment.    PSH:  He  has a past surgical history that includes Eye surgery; Foot Amputation (Left, 10/18/2022); and Foot Amputation (Left, 12/5/2022).    Applicable Nutrition Concerns:   Skin:  Oral:  GI:    Applicable Interval History:   2/5 SLP rec pureed food nectar thick liquid  2/6 SLP rec pureed food thin liquid      Reported/Observed/Food/Nutrition Related History:     2/8  Pt slumbering at time of visit. RN able to give food prefs observed. Dislikes egg, corn. Pudding is favorite.         Anthropometrics     Flowsheet Rows      Flowsheet Row First Filed Value   Admission Height 152 cm (59.84\") Documented at 02/05/2024 1239   Admission Weight 85.1 kg (187 lb 9.8 oz) Documented at 02/05/2024 1239       Height: Height: 152 cm (59.84\")  Last Filed Weight: Weight: 85.1 kg (187 lb 9.8 oz) (02/05/24 1239)  Method: Weight Method: Estimated  BMI: BMI (Calculated): 36.8  BMI classification: Obese Class II: 35-39.9kg/m2    UBW:  wts per EMR estimates or bed range 109 -183 accuracy uncertain  Weight change:  unk at this " time     Nutrition Focused Physical Exam     Date:   2/8      Unable to perform exam due to: Pt unable to participate at time of visit      Current Nutrition Prescription   PO: Diet: Diabetic Diets, Cardiac Diets; Healthy Heart (2-3 Na+); Consistent Carbohydrate; No Mixed Consistencies, Feeding Assistance - Nursing, No Straw; Texture: Pureed (NDD 1); Fluid Consistency: Thin (IDDSI 0)  Oral Nutrition Supplement:  Boost Plus daily added per RD  Intake: 3 Days: 56% x 4 meals recorded      Nutrition Diagnosis   Date:  2/8            Updated:    Problem Potential suboptimal intake   Etiology Dysphagia pureed diet   Signs/Symptoms 56% x 4 meals recorded   Status:       Goal:   General: Nutrition to support treatment  PO: Increase intake  EN/PN: N/A    Nutrition Intervention      Follow treatment progress, Care plan reviewed, Menu provided, Supplement provided Menu to use w pt. Preferences recorded for Food Svc        Monitoring/Evaluation:   Per protocol, I&O, PO intake, Supplement intake, Pertinent labs, Weight, Symptoms, Swallow function      Melissa Serrano RD  Time Spent: 25 min

## 2024-02-08 NOTE — THERAPY EVALUATION
Patient Name: Omkar Nava  : 1955    MRN: [id number]                              Today's Date: 2024       Admit Date: 2024    Visit Dx:     ICD-10-CM ICD-9-CM   1. Encephalopathy  G93.40 348.30   2. Altered mental status, unspecified altered mental status type  R41.82 780.97   3. Elevated troponin  R79.89 790.6   4. Acute kidney injury  N17.9 584.9   5. 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, without long-term current use of insulin    Orthostatic hypotension    Acute osteomyelitis of left foot    Type 2 diabetes mellitus    Essential hypertension    Psychotic disorder    Neurocognitive disorder    S/P transmetatarsal amputation of foot, left    AMS (altered mental status)    Hypoxia    Abscess of left foot    Anemia of chronic disease    Aspiration pneumonia    Cellulitis of left toe    Cellulitis of lower extremity    Cervical spine pain    Chronic kidney disease    Closed head injury without loss of consciousness    Dementia    Dental cavities    Diarrhea of presumed infectious origin    Displaced fracture of proximal phalanx of left great toe, initial encounter for open fracture    Dysphagia    Erectile dysfunction    Fall    Gas gangrene    Generalized muscle weakness    Hallucinations    Hypertensive heart and chronic kidney disease 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 failure, with stage 1 through stage 4 chronic kidney disease, or unspecified chronic kidney disease    Insomnia due to medical condition    Iron deficiency anemia    Klebsiella pneumoniae (k. pneumoniae) as the cause of diseases classified elsewhere    Laceration without foreign body, left foot, subsequent encounter    Long term (current) use of insulin    Other acute osteomyelitis, left ankle and foot    Personal history of Methicillin resistant  Staphylococcus aureus infection    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    Protein calorie malnutrition    Simple chronic bronchitis    Tobacco use    Type 2 diabetes mellitus with diabetic neuropathy, unspecified    Wound infection, posttraumatic    Type 2 diabetes mellitus with diabetic chronic kidney disease    Encephalopathy    Bilateral pneumonia    Acute respiratory failure with hypoxia     Past Medical History:   Diagnosis Date    Anemia     Dementia     Diabetes mellitus     Dysphagia     GERD (gastroesophageal reflux disease)     History of alcohol abuse     History of cocaine use     History of marijuana use     Hypertension     Osteomyelitis     Poor historian     records obtained from nursing home records & his family    Visual impairment      Past Surgical History:   Procedure Laterality Date    AMPUTATION FOOT Left 10/18/2022    Procedure: PARTIAL FIRST RAY AMPUTATION LEFT;  Surgeon: Yeison Petty MD;  Location:  [facility];  Service: Orthopedics;  Laterality: Left;    AMPUTATION FOOT Left 12/5/2022    Procedure: Transmetatarsal of Left Foot;  Surgeon: Yeison Petty MD;  Location:  [facility] OR;  Service: Orthopedics;  Laterality: Left;    EYE SURGERY        General Information       Row Name 02/08/24 1106          OT Time and Intention    Document Type evaluation  -CS     Mode of Treatment occupational therapy  -CS       Row Name 02/08/24 1106          General Information    Patient Profile Reviewed yes  -CS     Prior Level of Function mod assist:;all household mobility;ADL's;min assist:;feeding;community mobility;w/c or scooter  -CS     Existing Precautions/Restrictions fall;oxygen therapy device and L/min;other (see comments)  L transmet amp x 5, visually impaired, baseline dementia w/ visual and behavioral disturbances  -CS     Barriers to Rehab medically complex;previous functional deficit;cognitive status  -CS       Row Name 02/08/24 1106          Living Environment    People in Home facility resident  -CS        Row Name 02/08/24 1106          Cognition    Orientation Status (Cognition) oriented to;person;unable/difficult to assess;disoriented to;place;situation;time  -CS       Row Name 02/08/24 1106          Safety Issues, Functional Mobility    Safety Issues Affecting Function (Mobility) awareness of need for assistance;positioning of assistive device;insight into deficits/self-awareness;ability to follow commands;safety precautions follow-through/compliance;sequencing abilities;safety precaution awareness  -CS     Impairments Affecting Function (Mobility) balance;endurance/activity tolerance;pain;postural/trunk control;range of motion (ROM);strength;cognition  -CS     Cognitive Impairments, Mobility Safety/Performance awareness, need for assistance;insight into deficits/self-awareness;problem-solving/reasoning;safety precaution awareness;safety precaution follow-through;sequencing abilities  -CS               User Key  (r) = Recorded By, (t) = Taken By, (c) = Cosigned By      Initials Name Provider Type    CS Kemar Vazquez OT Occupational Therapist                     Mobility/ADL's       Row Name 02/08/24 1113          Bed Mobility    Bed Mobility supine-sit;scooting/bridging  -CS     Scooting/Bridging [place] (Bed Mobility) moderate assist (50% patient effort);verbal cues;nonverbal cues (demo/gesture)  -CS     Supine-Sit [place] (Bed Mobility) moderate assist (50% patient effort);2 person assist;verbal cues;nonverbal cues (demo/gesture)  -CS     Bed Mobility, Safety Issues impaired trunk control for bed mobility  -CS     Assistive Device (Bed Mobility) bed rails;head of bed elevated;draw sheet  -CS     Comment, (Bed Mobility) verbal/tactile cues for sequencing BLEs to EOB and increased assist for trunk to achieve sitting posture  -CS       Row Name 02/08/24 1113          Transfers    Transfers sit-stand transfer;stand-sit transfer  -CS     Comment, (Transfers) cues for initiation  -CS       Row Name  02/08/24 1113          Sit-Stand Transfer    Sit-Stand [place] (Transfers) minimum assist (75% patient effort);2 person assist;verbal cues;nonverbal cues (demo/gesture)  -     Assistive Device (Sit-Stand Transfers) walker, front-wheeled  -       Row Name 02/08/24 1113          Functional Mobility    Functional Mobility- Comment defer to PT for specifics, ModA for approx 25ft w/ RW and chair follow  -       Row Name 02/08/24 1113          Activities of Daily Living    BADL Assessment/Intervention lower body dressing;grooming;toileting;feeding  -       Row Name 02/08/24 [facility]          Lower Body Dressing Assessment/Training    [place] Level (Lower Body Dressing) don;socks;dependent (less than 25% patient effort)  -CS     Position (Lower Body Dressing) edge of bed sitting  -       Row Name 02/08/24 [facility]          Grooming Assessment/Training    [place] Level (Grooming) wash face, hands;maximum assist (25% patient effort)  -CS     Position (Grooming) edge of bed sitting  -       Row Name 02/08/24 [facility]          Toileting Assessment/Training    [place] Level (Toileting) adjust/manage clothing;perform perineal hygiene;dependent (less than 25% patient effort)  -       Row Name 02/08/24 1113          Self-Feeding Assessment/Training    [place] Level (Feeding) feeding skills;liquids to mouth;minimum assist (75% patient effort)  -     Assistive Devices (Feeding) adapted cup  -     Comment, (Feeding) encouraged bimanual skills  -               User Key  (r) = Recorded By, (t) = Taken By, (c) = Cosigned By      Initials Name Provider Type     Kemar Vazquez OT Occupational Therapist                   Obj/Interventions       Row Name 02/08/24 1120          Sensory Assessment (Somatosensory)    Sensory Assessment (Somatosensory) unable/difficult to assess  -       Row Name 02/08/24 1120          Vision Assessment/Intervention    Visual Impairment/Limitations unable/difficult to  assess;visual/perceptual impairments present  -CS       Row Name 02/08/24 1120          Range of Motion Comprehensive    General Range of Motion bilateral upper extremity ROM WFL  -CS       Row Name 02/08/24 1120          Strength Comprehensive (MMT)    General Manual Muscle Testing (MMT) Assessment upper extremity strength deficits identified;other (see comments)  -CS     Comment, General Manual Muscle Testing (MMT) Assessment formal MMT limited by hallucinations and cognitive status *baseline dementia*  -CS       Row Name 02/08/24 1120          Motor Skills    Motor Skills coordination;functional endurance  -CS     Coordination bimanual skills;moderate impairment  -CS     Functional Endurance moderate to poor, easily fatigued  -CS       Row Name 02/08/24 1120          Balance    Balance Assessment sitting static balance;sitting dynamic balance;standing static balance;standing dynamic balance  -CS     Static Sitting Balance standby assist  -CS     Dynamic Sitting Balance contact guard  -CS     Position, Sitting Balance unsupported;sitting edge of bed  -CS     Static Standing Balance minimal assist  -CS     Dynamic Standing Balance moderate assist  -CS     Position/Device Used, Standing Balance supported;walker, front-wheeled  -CS     Balance Interventions sitting;sit to stand;standing  -CS               User Key  (r) = Recorded By, (t) = Taken By, (c) = Cosigned By      Initials Name Provider Type    CS Kemar Vazquez, OT Occupational Therapist                   Goals/Plan       Row Name 02/08/24 1127          Bed Mobility Goal 1 (OT)    Activity/Assistive Device (Bed Mobility Goal 1, OT) sit to supine;supine to sit;scooting  -     [place] Level/Cues Needed (Bed Mobility Goal 1, OT) minimum assist (75% or more patient effort)  -CS     Time Frame (Bed Mobility Goal 1, OT) long term goal (LTG);10 days  -CS     Progress/Outcomes (Bed Mobility Goal 1, OT) new goal  -       Row Name 02/08/24 1127           Dressing Goal 1 (OT)    Activity/Device (Dressing Goal 1, OT) upper body dressing;lower body dressing  -CS     [place]/Cues Needed (Dressing Goal 1, OT) moderate assist (50-74% patient effort)  -CS     Time Frame (Dressing Goal 1, OT) long term goal (LTG);10 days  -CS     Progress/Outcome (Dressing Goal 1, OT) new goal  -CS       Row Name 02/08/24 1127          Problem Specific Goal 1 (OT)    Problem Specific Goal 1 (OT) Pt will follow 3/3 commands during ADL related mobility with appropriate response to safety cues.  -CS     Time Frame (Problem Specific Goal 1, OT) 10 days;long term goal (LTG)  -CS     Progress/Outcome (Problem Specific Goal 1, OT) new goal  -CS       Row Name 02/08/24 1127          Therapy Assessment/Plan (OT)    Planned Therapy Interventions (OT) functional balance retraining;activity tolerance training;occupation/activity based interventions;ROM/therapeutic exercise;strengthening exercise;transfer/mobility retraining;patient/caregiver education/training;neuromuscular control/coordination retraining;adaptive equipment training  -CS               User Key  (r) = Recorded By, (t) = Taken By, (c) = Cosigned By      Initials Name Provider Type    CS Kemar Vazquez, OT Occupational Therapist                   Clinical Impression       Row Name 02/08/24 1122          Pain Assessment    Additional Documentation Pain Scale: FACES Pre/Post-Treatment (Group)  -       Row Name 02/08/24 1122          Pain Scale: FACES Pre/Post-Treatment    Pain: FACES Scale, Pretreatment 0-->no hurt  -CS     Posttreatment Pain Rating 0-->no hurt  -CS       Row Name 02/08/24 1122          Plan of Care Review    Plan of Care Reviewed With patient  -CS     Progress no change  -CS     Outcome Evaluation Pt presents near recent baseline for cognitive, strength, visual-perceptual, balance, and coordination deficits - OT to follow while admitted to promote return to PLOF. Frequent cueing for orientation to task,  auditory/visual hallucinations persist (baseline, severe dementia). Rec return to intermediate/LTC.  -CS       Row Name 02/08/24 1122          Therapy Assessment/Plan (OT)    Rehab Potential (OT) good, to achieve stated therapy goals  -CS     Criteria for Skilled Therapeutic Interventions Met (OT) yes;skilled treatment is necessary;meets criteria  -CS     Therapy Frequency (OT) daily  -CS       Row Name 02/08/24 1122          Therapy Plan Review/Discharge Plan (OT)    Anticipated Discharge Disposition (OT) assisted living;extended care facility  -CS       Row Name 02/08/24 1122          Vital Signs    Pre Systolic BP Rehab --  RN cleared for tx  -CS     O2 Delivery Pre Treatment room air  -CS     O2 Delivery Intra Treatment room air  -CS     O2 Delivery Post Treatment room air  -CS     Pre Patient Position Supine  -CS     Intra Patient Position Standing  -CS     Post Patient Position Sitting  -CS       Row Name 02/08/24 1122          Positioning and Restraints    Pre-Treatment Position in bed  -CS     Post Treatment Position bed  -CS     In Bed notified nsg;supine;call light within reach;encouraged to call for assist;exit alarm on  -CS     Restraints released:;reapplied:;soft limb  released 4pts, reapplied BUE only, RN aware and approved  -CS               User Key  (r) = Recorded By, (t) = Taken By, (c) = Cosigned By      Initials Name Provider Type    CS Kemar Vazquez, OT Occupational Therapist                   Outcome Measures       Row Name 02/08/24 1130          How much help from another is currently needed...    Putting on and taking off regular lower body clothing? 1  -CS     Bathing (including washing, rinsing, and drying) 2  -CS     Toileting (which includes using toilet bed pan or urinal) 2  -CS     Putting on and taking off regular upper body clothing 2  -CS     Taking care of personal grooming (such as brushing teeth) 2  -CS     Eating meals 2  -CS     AM-PAC 6 Clicks Score (OT) 11  -CS       Row Name  02/08/24 1130          Functional Assessment    Outcome Measure Options AM-PAC 6 Clicks Daily Activity (OT)  -CS               User Key  (r) = Recorded By, (t) = Taken By, (c) = Cosigned By      Initials Name Provider Type     Kemar Vazquez OT Occupational Therapist                    Occupational Therapy Education       Title: PT OT SLP Therapies (In Progress)       Topic: Occupational Therapy (In Progress)       Point: ADL training (In Progress)       Description:   Instruct learner(s) on proper safety adaptation and remediation techniques during self care or transfers.   Instruct in proper use of assistive devices.                  Learning Progress Summary             Patient Acceptance, E,D, NR by  at 2/8/2024 1131                         Point: Home exercise program (Not Started)       Description:   Instruct learner(s) on appropriate technique for monitoring, assisting and/or progressing therapeutic exercises/activities.                  Learner Progress:  Not documented in this visit.              Point: Precautions (In Progress)       Description:   Instruct learner(s) on prescribed precautions during self-care and functional transfers.                  Learning Progress Summary             Patient Acceptance, E,D, NR by  at 2/8/2024 1131                         Point: Body mechanics (In Progress)       Description:   Instruct learner(s) on proper positioning and spine alignment during self-care, functional mobility activities and/or exercises.                  Learning Progress Summary             Patient Acceptance, E,D, NR by  at 2/8/2024 1131                                         User Key       Initials Effective Dates Name Provider Type Discipline     06/16/21 -  Kemar Vazquez OT Occupational Therapist OT                  OT Recommendation and Plan  Planned Therapy Interventions (OT): functional balance retraining, activity tolerance training, occupation/activity based  interventions, ROM/therapeutic exercise, strengthening exercise, transfer/mobility retraining, patient/caregiver education/training, neuromuscular control/coordination retraining, adaptive equipment training  Therapy Frequency (OT): daily  Plan of Care Review  Plan of Care Reviewed With: patient  Progress: no change  Outcome Evaluation: Pt presents near recent baseline for cognitive, strength, visual-perceptual, balance, and coordination deficits - OT to follow while admitted to promote return to PLOF. Frequent cueing for orientation to task, auditory/visual hallucinations persist (baseline, severe dementia). Rec return to [facility]/LTC.     Time Calculation:   Evaluation Complexity (OT)  Review Occupational Profile/Medical/Therapy History Complexity: expanded/moderate complexity  Assessment, Occupational Performance/Identification of Deficit Complexity: 3-5 performance deficits  Clinical Decision Making Complexity (OT): detailed assessment/moderate complexity  Overall Complexity of Evaluation (OT): moderate complexity     Time Calculation- OT       Row Name 02/08/24 1058             Time Calculation- OT    OT Start Time 1011  -CS      OT Received On 02/08/24  -CS      OT Goal Re-Cert Due Date 02/18/24  -CS         Untimed Charges    OT Eval/Re-eval Minutes 46  -CS         Total Minutes    Untimed Charges Total Minutes 46  -CS       Total Minutes 46  -CS                User Key  (r) = Recorded By, (t) = Taken By, (c) = Cosigned By      Initials Name Provider Type    Kemar Jordan OT Occupational Therapist                           Kemar Vazquez OT  2/8/2024

## 2024-02-08 NOTE — PLAN OF CARE
Goal Outcome Evaluation:  Plan of Care Reviewed With: patient        Progress: improving  Outcome Evaluation: Patient showing improvement with ability to ambulate 24' modAx2 with FWW, requiring constant assist for balance and management of AD. IPPT remains indicated to address deficits in strength and balance. Recommend D/C back to [facility]TC when medically appropriate.      Anticipated Discharge Disposition (PT): assisted living, extended care facility

## 2024-02-08 NOTE — THERAPY TREATMENT NOTE
Acute Care - Speech Language Pathology   Swallow Treatment Note [facility]     Patient Name: Omkar Nava  : 1955  MRN: [id number]  Today's Date: 2024               Admit Date: 2024    Visit Dx:     ICD-10-CM ICD-9-CM   1. Encephalopathy  G93.40 348.30   2. Altered mental status, unspecified altered mental status type  R41.82 780.97   3. Elevated troponin  R79.89 790.6   4. Acute kidney injury  N17.9 584.9   5. 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, without long-term current use of insulin    Orthostatic hypotension    Acute osteomyelitis of left foot    Type 2 diabetes mellitus    Essential hypertension    Psychotic disorder    Neurocognitive disorder    S/P transmetatarsal amputation of foot, left    AMS (altered mental status)    Hypoxia    Abscess of left foot    Anemia of chronic disease    Aspiration pneumonia    Cellulitis of left toe    Cellulitis of lower extremity    Cervical spine pain    Chronic kidney disease    Closed head injury without loss of consciousness    Dementia    Dental cavities    Diarrhea of presumed infectious origin    Displaced fracture of proximal phalanx of left great toe, initial encounter for open fracture    Dysphagia    Erectile dysfunction    Fall    Gas gangrene    Generalized muscle weakness    Hallucinations    Hypertensive heart and chronic kidney disease 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 failure, with stage 1 through stage 4 chronic kidney disease, or unspecified chronic kidney disease    Insomnia due to medical condition    Iron deficiency anemia    Klebsiella pneumoniae (k. pneumoniae) as the cause of diseases classified elsewhere    Laceration without foreign body, left foot, subsequent encounter    Long term (current) use of insulin    Other acute osteomyelitis, left ankle  and foot    Personal history of Methicillin resistant Staphylococcus aureus infection    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    Protein calorie malnutrition    Simple chronic bronchitis    Tobacco use    Type 2 diabetes mellitus with diabetic neuropathy, unspecified    Wound infection, posttraumatic    Type 2 diabetes mellitus with diabetic chronic kidney disease    Encephalopathy    Bilateral pneumonia    Acute respiratory failure with hypoxia     Past Medical History:   Diagnosis Date    Anemia     Dementia     Diabetes mellitus     Dysphagia     GERD (gastroesophageal reflux disease)     History of alcohol abuse     History of cocaine use     History of marijuana use     Hypertension     Osteomyelitis     Poor historian     records obtained from nursing home records & his family    Visual impairment      Past Surgical History:   Procedure Laterality Date    AMPUTATION FOOT Left 10/18/2022    Procedure: PARTIAL FIRST RAY AMPUTATION LEFT;  Surgeon: Yeison Petty MD;  Location:  [facility];  Service: Orthopedics;  Laterality: Left;    AMPUTATION FOOT Left 12/5/2022    Procedure: Transmetatarsal of Left Foot;  Surgeon: Yeison Petty MD;  Location:  [facility];  Service: Orthopedics;  Laterality: Left;    EYE SURGERY         SLP Recommendation and Plan     SLP Diet Recommendation: puree, thin liquids, other (see comments) (02/08/24 1530)  Recommended Precautions and Strategies: upright posture during/after eating, no straw, small bites of food and sips of liquid, general aspiration precautions, 1:1 supervision, assist with feeding (02/08/24 1530)  SLP Rec. for Method of Medication Administration: meds whole, meds crushed, with puree, as tolerated (02/08/24 1530)     Monitor for Signs of Aspiration: yes, notify SLP if any concerns (02/08/24 1530)        Anticipated Discharge Disposition (SLP): skilled nursing facility (02/08/24 1530)     Therapy Frequency (Swallow): 5 days per week (02/08/24 1530)  Predicted Duration Therapy  Intervention (Days): until discharge (02/08/24 1530)  Oral Care Recommendations: Oral Care BID/PRN, Suction toothbrush (02/08/24 1530)        Daily Summary of Progress (SLP): progress toward functional goals as expected (02/08/24 1530)               Treatment Assessment (SLP): continued, toleration of diet, no clinical signs of, suspected, aspiration (02/08/24 1530)  Treatment Assessment Comments (SLP): Pt seen for diet tolerance this date. No overt s/s of aspiration w/ minimal trials of thin liquids. Pt refused to accept solids. Unsure if pt at his baseline vs cognition impacting (02/08/24 1530)  Plan for Continued Treatment (SLP): continue treatment per plan of care (02/08/24 1530)         Plan of Care Reviewed With: patient  Progress: no change      SWALLOW EVALUATION (last 72 hours)       SLP Adult Swallow Evaluation       Row Name 02/08/24 1530       Rehab Evaluation    Document Type therapy note (daily note)  -CJ    Subjective Information no complaints  -CJ    Patient Observations alert;cooperative;agree to therapy  -CJ    Patient/Family/Caregiver Comments/Observations no family present  -CJ    Patient Effort adequate  -CJ    Symptoms Noted During/After Treatment none  -CJ       General Information    Patient Profile Reviewed --    Pertinent History Of Current Problem --    Current Method of Nutrition --    Precautions/Limitations, Vision --    Precautions/Limitations, Hearing --    Prior Level of Function-Communication --    Prior Level of Function-Swallowing --    Plans/Goals Discussed with --    Barriers to Rehab --    Patient's Goals for Discharge --    Family Goals for Discharge --       Pain    Additional Documentation Pain Scale: FACES Pre/Post-Treatment (Group)  -       Pain Scale: FACES Pre/Post-Treatment    Pain: FACES Scale, Pretreatment 0-->no hurt  -CJ    Posttreatment Pain Rating 0-->no hurt  -CJ       Oral Motor Structure and Function    Dentition Assessment missing teeth  -CJ    Secretion  Management WNL/WFL  -CJ    Mucosal Quality dry  -CJ       Oral Musculature and Cranial Nerve Assessment    Oral Motor General Assessment generalized oral motor weakness  -       General Eating/Swallowing Observations    Respiratory Support Currently in Use --    Eating/Swallowing Skills --    Positioning During Eating --    Utensils Used --    Consistencies Trialed --       Clinical Swallow Eval    Oral Prep Phase --    Oral Transit --    Pharyngeal Phase --    Clinical Swallow Evaluation Summary --       Oral Prep Concerns    Oral Prep Concerns --    Anterior Loss --       Oral Transit Concerns    Oral Transit Concerns --    Delayed Intiation of Bolus Transit --       Pharyngeal Phase Concerns    Pharyngeal Phase Concerns --    Throat Clear --       MBS/VFSS    Utensils Used --    Consistencies Trialed --       MBS/VFSS Interpretation    Oral Prep Phase --    Oral Transit Phase --    Oral Residue --       Oral Preparatory Phase    Oral Preparatory Phase --    Inadequate Manipulation --       Oral Transit Phase    Impaired Oral Transit Phase --    Delayed Initiation of bolus transit --    Increased A-P Transit Time --       Oral Residue    Impaired Oral Residue --    Lingual Residue --    Response to Oral Residue --    Oral Residue, Comment --       Initiation of Pharyngeal Swallow    Initiation of Pharyngeal Swallow --    Pharyngeal Phase --    Penetration Before the Swallow --    Penetration During the Swallow --    Aspiration During the Swallow --    Response to Penetration --    No spontaneous response to penetration and --    Response to Aspiration --    Rosenbek's Scale --    Pharyngeal Residue --    Attempted Compensatory Maneuvers --    Pharyngeal Phase, Comment --       SLP Evaluation Clinical Impression    SLP Swallowing Diagnosis --    Functional Impact --    Rehab Potential/Prognosis, Swallowing --    Swallow Criteria for Skilled Therapeutic Interventions Met --       SLP Treatment Clinical Impressions     Treatment Assessment (SLP) continued;toleration of diet;no clinical signs of;suspected;aspiration  -    Treatment Assessment Comments (SLP) Pt seen for diet tolerance this date. No overt s/s of aspiration w/ minimal trials of thin liquids. Pt refused to accept solids. Unsure if pt at his baseline vs cognition impacting  -    Daily Summary of Progress (SLP) progress toward functional goals as expected  -    Barriers to Overall Progress (SLP) Cognitive status  -    Plan for Continued Treatment (SLP) continue treatment per plan of care  -    Care Plan Review care plan/treatment goals reviewed  -       Recommendations    Therapy Frequency (Swallow) 5 days per week  -    Predicted Duration Therapy Intervention (Days) until discharge  -    SLP Diet Recommendation puree;thin liquids;other (see comments)  -    Recommended Diagnostics --    Recommended Precautions and Strategies upright posture during/after eating;no straw;small bites of food and sips of liquid;general aspiration precautions;1:1 supervision;assist with feeding  -    Oral Care Recommendations Oral Care BID/PRN;Suction toothbrush  -    SLP Rec. for Method of Medication Administration meds whole;meds crushed;with puree;as tolerated  -    Monitor for Signs of Aspiration yes;notify SLP if any concerns  -    Anticipated Discharge Disposition (SLP) skilled nursing facility  -CJ    Demonstrates Need for Referral to Another Service --              User Key  (r) = Recorded By, (t) = Taken By, (c) = Cosigned By      Initials Name Effective Dates    AC Kimberlee Castillo, MS [facility]-SLP 02/03/23 -     Eliz Mooney, MS CCC-SLP 07/11/23 -      Kathy Gonzales, MS [facility]-SLP 05/12/23 -                     EDUCATION  The patient has been educated in the following areas:   Dysphagia (Swallowing Impairment).        SLP GOALS       Row Name 02/08/24 6900       (LTG) Patient will demonstrate functional swallow for    Diet Texture (Demonstrate  functional swallow) pureed textures  -CJ    Liquid viscosity (Demonstrate functional swallow) thin liquids  -CJ    [place] (Demonstrate functional swallow) with 1:1 assist/ supervision  -CJ    Time Frame (Demonstrate functional swallow) by discharge  -CJ    Barriers (Demonstrate functional swallow) ? Baseline oral dysphagia  -CJ    Progress/Outcomes (Demonstrate functional swallow) continuing progress toward goal  -CJ       (STG) Patient will tolerate trials of    Consistencies Trialed (Tolerate trials) pureed textures;thin liquids  -CJ    Desired Outcome (Tolerate trials) without signs/symptoms of aspiration;with adequate oral prep/transit/clearance;with use of compensatory strategies (see comments)  -CJ    [place] (Tolerate trials) with 1:1 assist/ supervision  -CJ    Time Frame (Tolerate trials) by discharge  -CJ    Progress/Outcomes (Tolerate trials) continuing progress toward goal  -CJ    Comment (Tolerate trials) no overt s/s  -CJ       (STG) Patient will tolerate therapeutic trials of    Consistencies Trialed (Tolerate therapeutic trials) mechanical ground textures  -CJ    Desired Outcome (Tolerate therapeutic trials) without signs/symptoms of aspiration;with adequate oral prep/transit/clearance  -CJ    [place] (Tolerate therapeutic trials) with 1:1 assist/ supervision  -CJ    Time Frame (Tolerate therapeutic trials) by discharge  -CJ    Progress/Outcomes (Tolerate therapeutic trials) goal ongoing  -CJ    Comment (Tolerate therapeutic trials) pt refused solids  -CJ       (STG) Lingual Strengthening Goal 1 (SLP)    Activity (Lingual Strengthening Goal 1, SLP) increase lingual tone/sensation/control/coordination/movement  -CJ    Increase Lingual Tone/Sensation/Control/Coordination/Movement lingual resistance exercises  -CJ    [place]/Accuracy (Lingual Strengthening Goal 1, SLP) with moderate cues (50-74% accuracy)  -CJ    Time Frame (Lingual Strengthening Goal 1, SLP) short term goal (STG)   -CJ    Progress/Outcomes (Lingual Strengthening Goal 1, SLP) goal ongoing  -CJ       (STG) Pharyngeal Strengthening Exercise Goal 1 (SLP)    Activity (Pharyngeal Strengthening Goal 1, SLP) increase timing  -CJ    Increase Timing prepping - 3 second prep or suck swallow or 3-step swallow  -CJ    [place]/Accuracy (Pharyngeal Strengthening Goal 1, SLP) with moderate cues (50-74% accuracy)  -CJ    Time Frame (Pharyngeal Strengthening Goal 1, SLP) short term goal (STG)  -CJ    Progress/Outcomes (Pharyngeal Strengthening Goal 1, SLP) goal ongoing  -CJ              User Key  (r) = Recorded By, (t) = Taken By, (c) = Cosigned By      Initials Name Provider Type    Kimberlee Pastor, MS CCC-SLP Speech and Language Pathologist    Eliz Mooney MS CCC-SLP Speech and Language Pathologist                       Time Calculation:    Time Calculation- SLP       Row Name 02/08/24 1552             Time Calculation- SLP    SLP Start Time 1530  -CJ      SLP Received On 02/08/24  -         Untimed Charges    76365-BP Treatment Swallow Minutes 39  -CJ         Total Minutes    Untimed Charges Total Minutes 39  -CJ       Total Minutes 39  -CJ                User Key  (r) = Recorded By, (t) = Taken By, (c) = Cosigned By      Initials Name Provider Type    Eliz Mooney MS CCC-SLP Speech and Language Pathologist                    Therapy Charges for Today       Code Description Service Date Service Provider Modifiers Qty    [id number]  ST TREATMENT SWALLOW 3 2/8/2024 Eliz Cartagena MS CCC-SLP GN 1                 Eliz Cartagena MS CCC-SLP  2/8/2024

## 2024-02-08 NOTE — PLAN OF CARE
Goal Outcome Evaluation:  Plan of Care Reviewed With: patient        Progress: no change         Anticipated Discharge Disposition (SLP): skilled nursing facility             Treatment Assessment (SLP): continued, toleration of diet, no clinical signs of, suspected, aspiration (02/08/24 1530)  Treatment Assessment Comments (SLP): Pt seen for diet tolerance this date. No overt s/s of aspiration w/ minimal trials of thin liquids. Pt refused to accept solids. Unsure if pt at his baseline vs cognition impacting (02/08/24 1530)  Plan for Continued Treatment (SLP): continue treatment per plan of care (02/08/24 1530)

## 2024-02-08 NOTE — PLAN OF CARE
Goal Outcome Evaluation:  Plan of Care Reviewed With: patient        Progress: no change  Outcome Evaluation: Pt presents near recent baseline for cognitive, strength, visual-perceptual, balance, and coordination deficits - OT to follow while admitted to promote return to PLOF. Frequent cueing for orientation to task, auditory/visual hallucinations persist (baseline, severe dementia). Rec return to correction/LTC.      Anticipated Discharge Disposition (OT): assisted living, extended care facility

## 2024-02-08 NOTE — THERAPY TREATMENT NOTE
Patient Name: Omkar Nava  : 1955    MRN: [id number]                              Today's Date: 2024       Admit Date: 2024    Visit Dx:     ICD-10-CM ICD-9-CM   1. Encephalopathy  G93.40 348.30   2. Altered mental status, unspecified altered mental status type  R41.82 780.97   3. Elevated troponin  R79.89 790.6   4. Acute kidney injury  N17.9 584.9   5. 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, without long-term current use of insulin    Orthostatic hypotension    Acute osteomyelitis of left foot    Type 2 diabetes mellitus    Essential hypertension    Psychotic disorder    Neurocognitive disorder    S/P transmetatarsal amputation of foot, left    AMS (altered mental status)    Hypoxia    Abscess of left foot    Anemia of chronic disease    Aspiration pneumonia    Cellulitis of left toe    Cellulitis of lower extremity    Cervical spine pain    Chronic kidney disease    Closed head injury without loss of consciousness    Dementia    Dental cavities    Diarrhea of presumed infectious origin    Displaced fracture of proximal phalanx of left great toe, initial encounter for open fracture    Dysphagia    Erectile dysfunction    Fall    Gas gangrene    Generalized muscle weakness    Hallucinations    Hypertensive heart and chronic kidney disease 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 failure, with stage 1 through stage 4 chronic kidney disease, or unspecified chronic kidney disease    Insomnia due to medical condition    Iron deficiency anemia    Klebsiella pneumoniae (k. pneumoniae) as the cause of diseases classified elsewhere    Laceration without foreign body, left foot, subsequent encounter    Long term (current) use of insulin    Other acute osteomyelitis, left ankle and foot    Personal history of Methicillin resistant  Staphylococcus aureus infection    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    Protein calorie malnutrition    Simple chronic bronchitis    Tobacco use    Type 2 diabetes mellitus with diabetic neuropathy, unspecified    Wound infection, posttraumatic    Type 2 diabetes mellitus with diabetic chronic kidney disease    Encephalopathy    Bilateral pneumonia    Acute respiratory failure with hypoxia     Past Medical History:   Diagnosis Date    Anemia     Dementia     Diabetes mellitus     Dysphagia     GERD (gastroesophageal reflux disease)     History of alcohol abuse     History of cocaine use     History of marijuana use     Hypertension     Osteomyelitis     Poor historian     records obtained from nursing home records & his family    Visual impairment      Past Surgical History:   Procedure Laterality Date    AMPUTATION FOOT Left 10/18/2022    Procedure: PARTIAL FIRST RAY AMPUTATION LEFT;  Surgeon: Yeison Petty MD;  Location:  [facility];  Service: Orthopedics;  Laterality: Left;    AMPUTATION FOOT Left 12/5/2022    Procedure: Transmetatarsal of Left Foot;  Surgeon: Yeison Petty MD;  Location:  [facility];  Service: Orthopedics;  Laterality: Left;    EYE SURGERY        General Information       Row Name 02/08/24 1445          Physical Therapy Time and Intention    Document Type therapy note (daily note)  -CK     Mode of Treatment physical therapy  -       Row Name 02/08/24 1445          General Information    Patient Profile Reviewed yes  -CK     Existing Precautions/Restrictions fall;other (see comments)  L transmet amp x 5, visually impaired, baseline dementia w/ visual and behavioral disturbances  -CK     Barriers to Rehab medically complex;previous functional deficit;cognitive status  -       Row Name 02/08/24 1448          Cognition    Orientation Status (Cognition) oriented to;person;unable/difficult to assess;disoriented to;place;situation;time  -       Row Name 02/08/24 1442          Safety Issues, Functional  Mobility    Safety Issues Affecting Function (Mobility) awareness of need for assistance;insight into deficits/self-awareness;safety precaution awareness;safety precautions follow-through/compliance;sequencing abilities  -CK     Impairments Affecting Function (Mobility) balance;endurance/activity tolerance;pain;postural/trunk control;range of motion (ROM);strength;cognition  -CK               User Key  (r) = Recorded By, (t) = Taken By, (c) = Cosigned By      Initials Name Provider Type    CK Katherine Guillen PT Physical Therapist                   Mobility       [facility] [place] Name 02/08/24 1446          Bed Mobility    Bed Mobility sit-supine  -CK     Sit-Supine [place] (Bed Mobility) dependent (less than 25% patient effort);2 person assist  -CK     Comment, (Bed Mobility) EOB with OT upon entry, patient was very fatigued following activity and was dependent to return to the bed  -CK       Row Name 02/08/24 1446          Bed-Chair Transfer    Bed-Chair [place] (Transfers) maximum assist (25% patient effort);2 person assist;verbal cues  -CK     Assistive Device (Bed-Chair Transfers) other (see comments)  BUE support  -CK     Comment, (Bed-Chair Transfer) chair back to bed following ambulation, patient was very fatigued and required increased assist to return to bed  -CK       Row Name 02/08/24 1446          Sit-Stand Transfer    Sit-Stand [place] (Transfers) minimum assist (75% patient effort);2 person assist;verbal cues;nonverbal cues (demo/gesture)  -CK     Assistive Device (Sit-Stand Transfers) walker, front-wheeled  -CK     Comment, (Sit-Stand Transfer) cues for hand/foot placement, patient required increased time and redirection to complete, but did so with Melody to clear hips from bed  -CK       Row Name 02/08/24 1446          Gait/Stairs (Locomotion)    [place] Level (Gait) moderate assist (50% patient effort);2 person assist;verbal cues  -CK     Assistive Device (Gait) walker, front-wheeled  -CK      Distance in Feet (Gait) 24  -CK     Deviations/Abnormal Patterns (Gait) bilateral deviations;base of support, narrow;arianne decreased;stride length decreased;weight shifting decreased;festinating/shuffling;gait speed decreased  -CK     Bilateral Gait Deviations forward flexed posture;heel strike decreased  -CK     Right Sided Gait Deviations leans right  -CK     Comment, (Gait/Stairs) Patient ambulated into [place] with a shuffling step through gait pattern requiring constant assist for management of AD and a posterior lean. Cues provided for increased step length, patient very distracted by visual hallucinations throughout. R lean noted as patient began to fatigue and he utilized a chair follow to rest. Attempted to ambulate back into room, but patient too fatigued.  -CK               User Key  (r) = Recorded By, (t) = Taken By, (c) = Cosigned By      Initials Name Provider Type    CK Katherine Gulilen PT Physical Therapist                   Obj/Interventions       Row Name 02/08/24 0478          Balance    Balance Assessment sitting static balance;standing static balance;standing dynamic balance  -CK     Static Sitting Balance contact guard  -CK     Dynamic Sitting Balance contact guard  -CK     Position, Sitting Balance unsupported;sitting edge of bed  -CK     Static Standing Balance minimal assist  -CK     Dynamic Standing Balance moderate assist  -CK     Position/Device Used, Standing Balance supported;walker, front-wheeled  -CK     Comment, Balance posterior lean throughout  -CK               User Key  (r) = Recorded By, (t) = Taken By, (c) = Cosigned By      Initials Name Provider Type    CK Katherine Guillen PT Physical Therapist                   Goals/Plan    No documentation.                  Clinical Impression       Row Name 02/08/24 3446          Pain    Additional Documentation Pain Scale: FACES Pre/Post-Treatment (Group)  -CK       Row Name 02/08/24 2603          Pain Scale: FACES  Pre/Post-Treatment    Pain: FACES Scale, Pretreatment 0-->no hurt  -CK     Posttreatment Pain Rating 0-->no hurt  -CK       Row Name 02/08/24 1457          Plan of Care Review    Plan of Care Reviewed With patient  -CK     Progress improving  -CK     Outcome Evaluation Patient showing improvement with ability to ambulate 24' modAx2 with FWW, requiring constant assist for balance and management of AD. IPPT remains indicated to address deficits in strength and balance. Recommend D/C back to [facility]/LTC when medically appropriate.  -CK       Row Name 02/08/24 1457          Vital Signs    Pre Systolic BP Rehab 131  -CK     Pre Treatment Diastolic BP 55  -CK     Pretreatment Heart Rate (beats/min) 61  -CK     Posttreatment Heart Rate (beats/min) 58  -CK     Pre SpO2 (%) 94  -CK     O2 Delivery Pre Treatment room air  -CK     O2 Delivery Intra Treatment room air  -CK     Post SpO2 (%) 99  -CK     O2 Delivery Post Treatment room air  -CK     Pre Patient Position Sitting  -CK     Intra Patient Position Standing  -CK     Post Patient Position Supine  -CK       Row Name 02/08/24 1457          Positioning and Restraints    Pre-Treatment Position in bed  -CK     Post Treatment Position bed  -CK     In Bed supine;call light within reach;encouraged to call for assist;exit alarm on;notified nsg;waffle boots/both  -CK               User Key  (r) = Recorded By, (t) = Taken By, (c) = Cosigned By      Initials Name Provider Type    CK Katherine Guillen, PT Physical Therapist                   Outcome Measures       Row Name 02/08/24 1503 02/08/24 0800       How much help from another person do you currently need...    Turning from your back to your side while in flat bed without using bedrails? 3  -CK 3  -LH    Moving from lying on back to sitting on the side of a flat bed without bedrails? 2  -CK 3  -LH    Moving to and from a bed to a chair (including a wheelchair)? 2  -CK 3  -LH    Standing up from a chair using your arms (e.g.,  wheelchair, bedside chair)? 3  -CK 3  -LH    Climbing 3-5 steps with a railing? 2  -CK 2  -LH    To walk in hospital room? 2  -CK 2  -LH    AM-PAC 6 Clicks Score (PT) 14  -CK 16  -    Highest Level of Mobility Goal 4 --> Transfer to chair/commode  -CK 5 --> Static standing  -      Row Name 02/08/24 1503 02/08/24 1130       Functional Assessment    Outcome Measure Options AM-PAC 6 Clicks Basic Mobility (PT)  -CK AM-PAC 6 Clicks Daily Activity (OT)  -CS              User Key  (r) = Recorded By, (t) = Taken By, (c) = Cosigned By      Initials Name Provider Type    CS Kemar Vazquez, OT Occupational Therapist    CK Katherine Guillen, PT Physical Therapist     Shonda Lr, RN Registered Nurse                                 Physical Therapy Education       Title: PT OT SLP Therapies (In Progress)       Topic: Physical Therapy (In Progress)       Point: Mobility training (In Progress)       Learning Progress Summary             Patient Acceptance, E, NR by  at 2/8/2024 1503    Eager, E, VU,DU,NR by  at 2/6/2024 1527    Comment: Educated pt. safety/technique w/bed mobility, transfers, ambulation, PT POC   Family Eager, E, VU,DU,NR by  at 2/6/2024 1527    Comment: Educated pt. safety/technique w/bed mobility, transfers, ambulation, PT POC                         Point: Home exercise program (Done)       Learning Progress Summary             Patient Eager, E, VU,DU,NR by  at 2/6/2024 1527    Comment: Educated pt. safety/technique w/bed mobility, transfers, ambulation, PT POC   Family Eager, E, VU,DU,NR by  at 2/6/2024 1527    Comment: Educated pt. safety/technique w/bed mobility, transfers, ambulation, PT POC                         Point: Body mechanics (In Progress)       Learning Progress Summary             Patient Acceptance, E, NR by  at 2/8/2024 1503    Eager, E, VU,DU,NR by  at 2/6/2024 1527    Comment: Educated pt. safety/technique w/bed mobility, transfers, ambulation, PT POC   Family  Eager, E, VU,DU,NR by  at 2/6/2024 1527    Comment: Educated pt. safety/technique w/bed mobility, transfers, ambulation, PT POC                         Point: Precautions (In Progress)       Learning Progress Summary             Patient Acceptance, E, NR by  at 2/8/2024 1503    Eager, E, VU,DU,NR by  at 2/6/2024 1527    Comment: Educated pt. safety/technique w/bed mobility, transfers, ambulation, PT POC   Family Eager, E, VU,DU,NR by  at 2/6/2024 1527    Comment: Educated pt. safety/technique w/bed mobility, transfers, ambulation, PT POC                                         User Key       Initials Effective Dates Name Provider Type Discipline     06/01/21 -  Karlee Garrett, PT Physical Therapist PT    CK 02/06/24 -  Katherine Guillen PT Physical Therapist PT                  PT Recommendation and Plan     Plan of Care Reviewed With: patient  Progress: improving  Outcome Evaluation: Patient showing improvement with ability to ambulate 24' modAx2 with FWW, requiring constant assist for balance and management of AD. IPPT remains indicated to address deficits in strength and balance. Recommend D/C back to residential/LTC when medically appropriate.     Time Calculation:         PT Charges       Row Name 02/08/24 1504             Time Calculation    Start Time 1025  -CK      PT Received On 02/08/24  -CK         Timed Charges    75171 - Gait Training Minutes  14  -CK      43607 - PT Therapeutic Activity Minutes 15  -CK         Total Minutes    Timed Charges Total Minutes 29  -CK       Total Minutes 29  -CK                User Key  (r) = Recorded By, (t) = Taken By, (c) = Cosigned By      Initials Name Provider Type    CK Katherine Guillen, PT Physical Therapist                  Therapy Charges for Today       Code Description Service Date Service Provider Modifiers Qty    89520178257 HC GAIT TRAINING EA 15 MIN 2/8/2024 Katherine Guillen, PT GP 1    97525421620 HC PT THERAPEUTIC ACT EA 15 MIN 2/8/2024 Mindy  Katherine, PT GP 1            PT G-Codes  Outcome Measure Options: AM-PAC 6 Clicks Basic Mobility (PT)  AM-PAC 6 Clicks Score (PT): 14  AM-PAC 6 Clicks Score (OT): 11  PT Discharge Summary  Anticipated Discharge Disposition (PT): assisted living, extended care facility    Katherine Guillen, PT  2/8/2024

## 2024-02-08 NOTE — PROGRESS NOTES
" [facility] Neurology    Progress Note    Patient Name: Omkar Nava  : 1955  MRN: [id number]  Primary Care Physician:  Deann Cao MD  Date of admission: 2024    Subjective     Chief Complaint: Dementia    History of Present Illness   Patient was seen resting comfortably in bed.  He was requiring 2-point restraints.  Per nursing report patient appears to become extremely agitated midday.  Upon assessment patient follow basic commands was able to state his name.  Did tell me to \"leave him alone\". was reminded the need to take p.o. medications.    Review of Systems   General: Negative for fever, nausea, or vomiting.   Neurological: Negative for headache, pain, or weakness.     Objective     Physical Exam  Vitals and nursing note reviewed.   Constitutional:       General: He is not in acute distress.     Appearance: He is not ill-appearing.   Eyes:      Extraocular Movements: Extraocular movements intact.      Pupils: Pupils are equal, round, and reactive to light.      Comments: No nystagmus or deviated gaze noted   Neurological:      Mental Status: He is alert. He is disoriented.      Cranial Nerves: No cranial nerve deficit or facial asymmetry.      Sensory: No sensory deficit.      Motor: No weakness or seizure activity.      Comments:     Cranial Nerves   CN II: Pupils are equal, round, and reactive to light. Normal visual acuity and visual fields.    CN III IV VI: Extraocular movements are full without nystagmus.  CN V: Normal facial sensation and strength of muscles of mastication.  CN VII: Facial movements are symmetric. No weakness.  CN VIII:  Auditory acuity is normal.  CN XII: The tongue is midline.  No atrophy or fasciculations.              Vitals:   Temp:  [97.8 °F (36.6 °C)-98.9 °F (37.2 °C)] 98.9 °F (37.2 °C)  Heart Rate:  [57-71] 57  Resp:  [16-19] 18  BP: (104-169)/(46-89) 131/55    Current Medications    Current Facility-Administered Medications:     acetaminophen " (TYLENOL) tablet 650 mg, 650 mg, Oral, Q6H PRN, Sarah Gann R, DO    amLODIPine (NORVASC) tablet 10 mg, 10 mg, Oral, Q24H, Sarah Gann, DO, 10 mg at 02/07/24 0857    ARIPiprazole (ABILIFY) tablet 5 mg, 5 mg, Oral, Daily, Mila Crews K, APRN, 5 mg at 02/08/24 0813    atorvastatin (LIPITOR) tablet 20 mg, 20 mg, Oral, Nightly, Sarah Gann, DO, 20 mg at 02/07/24 2036    sennosides-docusate (PERICOLACE) 8.6-50 MG per tablet 2 tablet, 2 tablet, Oral, BID, 2 tablet at 02/07/24 2037 **AND** polyethylene glycol (MIRALAX) packet 17 g, 17 g, Oral, Daily PRN **AND** bisacodyl (DULCOLAX) EC tablet 5 mg, 5 mg, Oral, Daily PRN **AND** bisacodyl (DULCOLAX) suppository 10 mg, 10 mg, Rectal, Daily PRN, Sarah Gann R, DO    brimonidine (ALPHAGAN) 0.2 % ophthalmic solution 1 drop, 1 drop, Both Eyes, BID, 1 drop at 02/08/24 0822 **AND** timolol (TIMOPTIC) 0.5 % ophthalmic solution 1 drop, 1 drop, Both Eyes, BID, Sarah Gann, DO, 1 drop at 02/08/24 0822    carvedilol (COREG) tablet 25 mg, 25 mg, Oral, BID With Meals, Sarah Gann, DO, 25 mg at 02/07/24 1838    dextrose (D50W) (25 g/50 mL) IV injection 25 g, 25 g, Intravenous, Q15 Min PRN, Sarah Gann R, DO    dextrose (GLUTOSE) oral gel 15 g, 15 g, Oral, Q15 Min PRN, Sarah Gann R, DO    divalproex (DEPAKOTE) DR tablet 375 mg, 375 mg, Oral, BID, Agnes Dye, APRN, 375 mg at 02/08/24 0813    famotidine (PEPCID) tablet 20 mg, 20 mg, Oral, Daily, Ijeoma Kuhn, PharmD, 20 mg at 02/08/24 0813    ferrous sulfate tablet 325 mg, 325 mg, Oral, Daily With Breakfast, Sarah Gann DO, 325 mg at 02/08/24 0812    FLUoxetine (PROzac) capsule 20 mg, 20 mg, Oral, BID, Sarah Gann DO, 20 mg at 02/08/24 0812    glucagon (GLUCAGEN) injection 1 mg, 1 mg, Intramuscular, Q15 Min PRN, Sarah Gann DO    hydrALAZINE (APRESOLINE) tablet 50 mg, 50 mg, Oral, TID, Sarah Gann DO, 50 mg at 02/07/24 2035    hydrOXYzine (ATARAX) tablet 50 mg, 50  "mg, Oral, Q6H PRN, Sarah Gann R, DO, 50 mg at 02/06/24 1814    Insulin Lispro (humaLOG) injection 2-7 Units, 2-7 Units, Subcutaneous, 4x Daily AC & at Bedtime, Sarah Gann R, DO, 2 Units at 02/07/24 1206    ipratropium-albuterol (DUO-NEB) nebulizer solution 3 mL, 3 mL, Nebulization, 4x Daily - RT, Sarah Gann R, DO, 3 mL at 02/07/24 1505    melatonin tablet 5 mg, 5 mg, Oral, Nightly, Sarah Gann R, DO, 5 mg at 02/07/24 2036    ondansetron (ZOFRAN) injection 4 mg, 4 mg, Intravenous, Q6H PRN, Sarah Gann R, DO    sodium chloride 0.9 % flush 10 mL, 10 mL, Intravenous, PRN, Sarah Gann R, DO    sodium chloride 0.9 % flush 10 mL, 10 mL, Intravenous, Q12H, Sarah Gann R, DO, 10 mL at 02/08/24 0816    sodium chloride 0.9 % flush 10 mL, 10 mL, Intravenous, PRN, Sarah Gann R, DO, 10 mL at 02/07/24 0856    sodium chloride 0.9 % infusion 40 mL, 40 mL, Intravenous, PRN, Sarah Gann R, DO    sterile water (preservative free) injection  - ADS Override Pull, , , ,     thiamine (VITAMIN B-1) tablet 100 mg, 100 mg, Oral, Daily, Sarah Gann R, DO, 100 mg at 02/08/24 0812    torsemide (DEMADEX) tablet 10 mg, 10 mg, Oral, Daily, Kari Teague E, NARGIS    traZODone (DESYREL) tablet 25 mg, 25 mg, Oral, Nightly, Sarah Gann R, DO, 25 mg at 02/07/24 2036    Laboratory Results:   Lab Results   Component Value Date    GLUCOSE 89 02/08/2024    CALCIUM 8.8 02/08/2024     02/08/2024    K 4.5 02/08/2024    CO2 25.0 02/08/2024     02/08/2024    BUN 25 (H) 02/08/2024    CREATININE 1.53 (H) 02/08/2024    EGFRIFAFRI >60 07/25/2022    EGFRIFNONA >60 07/25/2022    BCR 16.3 02/08/2024    ANIONGAP 12.0 02/08/2024     Lab Results   Component Value Date    WBC 4.33 02/08/2024    HGB 8.6 (L) 02/08/2024    HCT 25.8 (L) 02/08/2024    MCV 87.5 02/08/2024     02/08/2024     No results found for: \"CHOL\"  Lab Results   Component Value Date    HDL 44 06/25/2022     Lab Results   Component Value " Date    .4 (H) 06/25/2022     Lab Results   Component Value Date    TRIG 58 06/25/2022     Lab Results   Component Value Date    HGBA1C 7.00 (H) 10/16/2022     Lab Results   Component Value Date    INR 1.08 10/18/2022    INR 1.02 10/14/2022    INR 1.17 (H) 09/29/2022    PROTIME 13.9 10/18/2022    PROTIME 13.3 10/14/2022    PROTIME 14.8 (H) 09/29/2022     Lab Results   Component Value Date    FOLATE 13.90 02/05/2024     Lab Results   Component Value Date    [id number] 670 02/05/2024             Assessment / Plan   Brief Patient Summary:  Omkar Nava is a 68 y.o. male with a past medical history of HFrEF, T2DM, chronic edema, CKD stage III, HTN, osteomyelitis s/p transmetatarsal amputation, dementia, HTN who presented from his living facility [facility] with complaint of altered mental status.      EEG showed no focal features or epileptic activity.  Nonspecific mild diffuse cerebral dysfunction.     CT head with no acute intracranial abnormalities.  Did show diffuse cortical atrophy with chronic microvascular changes.     Plan:   Altered mental status  Dementia  Questionable history of seizure  Continue Depakote  mg twice daily, per family this is for behavior and not seizure disorder.   Trough VPA level in a.m.; patient refused blood draw  Continue Abilify 5 mg daily  Trial Seroquel 12.5 mg midday.  Continue thiamine 100 mg daily  Continue trazodone 25 mg nightly  Continue fall/delirium precautions  Continue seizure precautions  If patient has witnessed seizure-like activity load with IV Depakote 1000 mg and increase maintenance dose to 500 mg twice daily.  IV Valium as needed for seizure-like activity  Neurochecks every shift  General neurology will continue to follow      I have discussed the above with the patient, bedside RN Dr. De Leon  Time spent with patient:50 minutes in face-to-face evaluation and management of the patient.    Copied text in this note has been reviewed and is accurate as  of 02/08/24.       Mila Crews, APRN

## 2024-02-08 NOTE — NURSING NOTE
WOC consulted for pressure injury to left heel, not POA    No pressure injury identified on either heel.  Patient in 4-point restraints.  Heel boots in place.    Sign off.    Sathya Garcia RN, BSN, CCRN, CWOCN  Wound, Ostomy and Continence (WOC) Department  [facility]

## 2024-02-08 NOTE — PROGRESS NOTES
"  [facility] at [facility]  PROGRESS NOTE    Date of Admission: 2/5/2024  Date of Service: 02/08/24    Primary Care Physician: Deann Cao MD    Chief Complaint: follow up heart failure    Problem List:   Encephalopathy    Type 2 diabetes mellitus    Essential hypertension    Anemia of chronic disease    Chronic kidney disease    Dementia    Bilateral pneumonia    Acute respiratory failure with hypoxia      Subjective      No family members at bedside. No documented events overnight.     Objective   Vitals: /55   Pulse 57   Temp 98.9 °F (37.2 °C) (Oral)   Resp 18   Ht 152 cm (59.84\")   Wt 85.1 kg (187 lb 9.8 oz)   SpO2 94%   BMI 36.83 kg/m²     Physical Exam:  GENERAL:  in no acute distress, altered mental status   HEENT: no jugular venous distention  HEART: Regular rhythm, normal rate  LUNGS: Clear to auscultation bilaterally. No wheezing, rales or rhonchi.  EXTREMITIES: No edema noted.     Results:  Results from last 7 days   Lab Units 02/08/24  0638 02/06/24  1144 02/05/24  1146   WBC 10*3/mm3 4.33 6.30 7.84   HEMOGLOBIN g/dL 8.6* 8.1* 7.7*   HEMATOCRIT % 25.8* 25.6* 25.0*   PLATELETS 10*3/mm3 271 214 243     Results from last 7 days   Lab Units 02/08/24  0638 02/06/24  1144 02/05/24  1146   SODIUM mmol/L 140 141 142   POTASSIUM mmol/L 4.5 4.9 5.4*   CHLORIDE mmol/L 103 104 107   CO2 mmol/L 25.0 26.0 27.0   BUN mg/dL 25* 26* 30*   CREATININE mg/dL 1.53* 1.43* 1.80*   GLUCOSE mg/dL 89 185* 125*      Lab Results   Component Value Date    TRIG 58 06/25/2022    HDL 44 06/25/2022    .4 (H) 06/25/2022    AST 22 02/08/2024    ALT 26 02/08/2024                         Results from last 7 days   Lab Units 02/05/24  1651 02/05/24  1146   CK TOTAL U/L  --  199   HSTROP T ng/L 155* 168*     Results from last 7 days   Lab Units 02/05/24  1651   PROBNP pg/mL 1,065.0*         Intake/Output Summary (Last 24 hours) at 2/8/2024 0912  Last data filed at 2/8/2024 0300  Gross per " 24 hour   Intake --   Output 300 ml   Net -300 ml       I personally reviewed the patient's EKG/Telemetry data    Radiology Data:   Results for orders placed during the hospital encounter of 01/15/24    Adult Transthoracic Echo Complete With Contrast if Necessary Per Protocol    Interpretation Summary    Left ventricular ejection fraction appears to be 56 - 60%.    Left ventricular wall thickness is consistent with hypertrophy.    Estimated right ventricular systolic pressure from tricuspid regurgitation is mildly elevated (35-45 mmHg).    There is a small (1-2cm) pericardial effusion.    No evidence for pericardial tamponade        Current Medications:  amLODIPine, 10 mg, Oral, Q24H  ARIPiprazole, 5 mg, Oral, Daily  atorvastatin, 20 mg, Oral, Nightly  brimonidine, 1 drop, Both Eyes, BID   And  timolol, 1 drop, Both Eyes, BID  carvedilol, 25 mg, Oral, BID With Meals  divalproex, 375 mg, Oral, BID  famotidine, 20 mg, Oral, Daily  ferrous sulfate, 325 mg, Oral, Daily With Breakfast  FLUoxetine, 20 mg, Oral, BID  hydrALAZINE, 50 mg, Oral, TID  insulin lispro, 2-7 Units, Subcutaneous, 4x Daily AC & at Bedtime  ipratropium-albuterol, 3 mL, Nebulization, 4x Daily - RT  melatonin, 5 mg, Oral, Nightly  senna-docusate sodium, 2 tablet, Oral, BID  sodium chloride, 10 mL, Intravenous, Q12H  sterile water (preservative free), , ,   thiamine, 100 mg, Oral, Daily  torsemide, 10 mg, Oral, Daily  traZODone, 25 mg, Oral, Nightly           Assessment  Heart failure preserved ejection fraction, EF 56-60%, proBNP npw at baseline  Elevated troponin, trending down, no chest pain  Coronary artery calcification on nondedicated CT chest  Hypertension  Diabetes  JAYY on CKD 3  Chronic anemia    Plan  Appears euvolemic. No peripheral edema. Transitioned back to oral diuretics today. Is and Os. Monitor renal function.   Continue amlodipine, coreg, hydralazine at current dose.   Continue statin therapy.   Not much more to add from a cardiac  standpoint. We will sign off and be available to see on an as needed basis.     Jenniffer Amezcua PA-C

## 2024-02-09 LAB
ANION GAP SERPL CALCULATED.3IONS-SCNC: 9 MMOL/L (ref 5–15)
BUN SERPL-MCNC: 30 MG/DL (ref 8–23)
BUN/CREAT SERPL: 17.9 (ref 7–25)
CALCIUM SPEC-SCNC: 9 MG/DL (ref 8.6–10.5)
CHLORIDE SERPL-SCNC: 102 MMOL/L (ref 98–107)
CO2 SERPL-SCNC: 27 MMOL/L (ref 22–29)
CREAT SERPL-MCNC: 1.68 MG/DL (ref 0.76–1.27)
EGFRCR SERPLBLD CKD-EPI 2021: 44.5 ML/MIN/1.73
GLUCOSE BLDC GLUCOMTR-MCNC: 124 MG/DL (ref 70–130)
GLUCOSE BLDC GLUCOMTR-MCNC: 132 MG/DL (ref 70–130)
GLUCOSE BLDC GLUCOMTR-MCNC: 141 MG/DL (ref 70–130)
GLUCOSE BLDC GLUCOMTR-MCNC: 155 MG/DL (ref 70–130)
GLUCOSE SERPL-MCNC: 120 MG/DL (ref 65–99)
POTASSIUM SERPL-SCNC: 4.3 MMOL/L (ref 3.5–5.2)
SODIUM SERPL-SCNC: 138 MMOL/L (ref 136–145)

## 2024-02-09 PROCEDURE — 82948 REAGENT STRIP/BLOOD GLUCOSE: CPT

## 2024-02-09 PROCEDURE — 25810000003 LACTATED RINGERS PER 1000 ML: Performed by: NURSE PRACTITIONER

## 2024-02-09 PROCEDURE — 80048 BASIC METABOLIC PNL TOTAL CA: CPT | Performed by: NURSE PRACTITIONER

## 2024-02-09 PROCEDURE — 25010000002 HEPARIN (PORCINE) PER 1000 UNITS: Performed by: NURSE PRACTITIONER

## 2024-02-09 PROCEDURE — 99233 SBSQ HOSP IP/OBS HIGH 50: CPT

## 2024-02-09 RX ORDER — HEPARIN SODIUM 5000 [USP'U]/ML
5000 INJECTION, SOLUTION INTRAVENOUS; SUBCUTANEOUS EVERY 12 HOURS SCHEDULED
Status: DISCONTINUED | OUTPATIENT
Start: 2024-02-09 | End: 2024-02-13 | Stop reason: HOSPADM

## 2024-02-09 RX ORDER — SODIUM CHLORIDE, SODIUM LACTATE, POTASSIUM CHLORIDE, CALCIUM CHLORIDE 600; 310; 30; 20 MG/100ML; MG/100ML; MG/100ML; MG/100ML
50 INJECTION, SOLUTION INTRAVENOUS CONTINUOUS
Status: ACTIVE | OUTPATIENT
Start: 2024-02-09 | End: 2024-02-10

## 2024-02-09 RX ORDER — QUETIAPINE FUMARATE 25 MG/1
12.5 TABLET, FILM COATED ORAL DAILY
Status: DISCONTINUED | OUTPATIENT
Start: 2024-02-10 | End: 2024-02-13 | Stop reason: HOSPADM

## 2024-02-09 RX ORDER — HYDRALAZINE HYDROCHLORIDE 20 MG/ML
10 INJECTION INTRAMUSCULAR; INTRAVENOUS EVERY 6 HOURS PRN
Status: DISCONTINUED | OUTPATIENT
Start: 2024-02-09 | End: 2024-02-13 | Stop reason: HOSPADM

## 2024-02-09 RX ORDER — QUETIAPINE FUMARATE 25 MG/1
12.5 TABLET, FILM COATED ORAL EVERY 12 HOURS SCHEDULED
Status: DISCONTINUED | OUTPATIENT
Start: 2024-02-09 | End: 2024-02-09

## 2024-02-09 RX ORDER — QUETIAPINE FUMARATE 25 MG/1
25 TABLET, FILM COATED ORAL NIGHTLY
Status: DISCONTINUED | OUTPATIENT
Start: 2024-02-09 | End: 2024-02-13 | Stop reason: HOSPADM

## 2024-02-09 RX ADMIN — CARVEDILOL 25 MG: 12.5 TABLET, FILM COATED ORAL at 10:02

## 2024-02-09 RX ADMIN — HEPARIN SODIUM 5000 UNITS: 5000 INJECTION INTRAVENOUS; SUBCUTANEOUS at 15:46

## 2024-02-09 RX ADMIN — HYDRALAZINE HYDROCHLORIDE 50 MG: 50 TABLET, FILM COATED ORAL at 21:24

## 2024-02-09 RX ADMIN — HEPARIN SODIUM 5000 UNITS: 5000 INJECTION INTRAVENOUS; SUBCUTANEOUS at 21:34

## 2024-02-09 RX ADMIN — ARIPIPRAZOLE 5 MG: 5 TABLET ORAL at 09:40

## 2024-02-09 RX ADMIN — HYDROXYZINE HYDROCHLORIDE 50 MG: 25 TABLET, FILM COATED ORAL at 10:02

## 2024-02-09 RX ADMIN — TIMOLOL MALEATE 1 DROP: 5 SOLUTION/ DROPS OPHTHALMIC at 21:34

## 2024-02-09 RX ADMIN — ATORVASTATIN CALCIUM 20 MG: 20 TABLET, FILM COATED ORAL at 21:24

## 2024-02-09 RX ADMIN — HYDROXYZINE HYDROCHLORIDE 50 MG: 25 TABLET, FILM COATED ORAL at 23:28

## 2024-02-09 RX ADMIN — Medication 10 ML: at 21:35

## 2024-02-09 RX ADMIN — FAMOTIDINE 20 MG: 20 TABLET, FILM COATED ORAL at 10:02

## 2024-02-09 RX ADMIN — TIMOLOL MALEATE 1 DROP: 5 SOLUTION/ DROPS OPHTHALMIC at 10:03

## 2024-02-09 RX ADMIN — Medication 10 ML: at 09:40

## 2024-02-09 RX ADMIN — TRAZODONE HYDROCHLORIDE 25 MG: 50 TABLET ORAL at 21:24

## 2024-02-09 RX ADMIN — SENNOSIDES AND DOCUSATE SODIUM 2 TABLET: 8.6; 5 TABLET ORAL at 21:24

## 2024-02-09 RX ADMIN — FLUOXETINE HYDROCHLORIDE 20 MG: 20 CAPSULE ORAL at 10:02

## 2024-02-09 RX ADMIN — TORSEMIDE 10 MG: 10 TABLET ORAL at 10:02

## 2024-02-09 RX ADMIN — SODIUM CHLORIDE, POTASSIUM CHLORIDE, SODIUM LACTATE AND CALCIUM CHLORIDE 50 ML/HR: 600; 310; 30; 20 INJECTION, SOLUTION INTRAVENOUS at 14:41

## 2024-02-09 RX ADMIN — FLUOXETINE HYDROCHLORIDE 20 MG: 20 CAPSULE ORAL at 21:24

## 2024-02-09 RX ADMIN — HYDRALAZINE HYDROCHLORIDE 50 MG: 50 TABLET, FILM COATED ORAL at 09:40

## 2024-02-09 RX ADMIN — FERROUS SULFATE TAB 325 MG (65 MG ELEMENTAL FE) 325 MG: 325 (65 FE) TAB at 10:02

## 2024-02-09 RX ADMIN — QUETIAPINE FUMARATE 12.5 MG: 25 TABLET ORAL at 09:42

## 2024-02-09 RX ADMIN — BRIMONIDINE TARTRATE 1 DROP: 2 SOLUTION OPHTHALMIC at 10:03

## 2024-02-09 RX ADMIN — BRIMONIDINE TARTRATE 1 DROP: 2 SOLUTION OPHTHALMIC at 21:34

## 2024-02-09 RX ADMIN — Medication 5 MG: at 21:24

## 2024-02-09 RX ADMIN — QUETIAPINE FUMARATE 25 MG: 25 TABLET ORAL at 21:24

## 2024-02-09 RX ADMIN — THIAMINE HCL TAB 100 MG 100 MG: 100 TAB at 10:02

## 2024-02-09 RX ADMIN — AMLODIPINE BESYLATE 10 MG: 10 TABLET ORAL at 10:02

## 2024-02-09 RX ADMIN — VALPROIC ACID 375 MG: 250 SOLUTION ORAL at 21:33

## 2024-02-09 NOTE — PLAN OF CARE
Goal Outcome Evaluation:              Outcome Evaluation: pt continues to have hallucinations/delusional.

## 2024-02-09 NOTE — PROGRESS NOTES
[facility] Services  PROGRESS NOTE    Patient Name: Omkar Nava  : 1955  MRN: [id number]    Date of Admission: 2024  Primary Care Physician: Deann Cao MD    Subjective   Subjective     CC:  F/u confusion    HPI:   Patient resting in bed, awake, appears calm. Tells me he is in the hospital and that it is [facility]. Says he is thirsty. Says he wants to get out of bed. Per RN, he finally took his medications after refusing them this morning.      Objective   Objective     Vital Signs:   Temp:  [96.5 °F (35.8 °C)-96.9 °F (36.1 °C)] 96.8 °F (36 °C)  Heart Rate:  [50-65] 60  Resp:  [16-19] 16  BP: (108-157)/(55-74) 134/59     Physical Exam:   Constitutional: No acute distress, awake, alert  HENT: NCAT, mucous membranes moist  Respiratory: Clear to auscultation bilaterally, respiratory effort normal, room air  Cardiovascular: RRR, no murmurs, rubs, or gallops  Gastrointestinal: Positive bowel sounds, soft, nontender, nondistended  Musculoskeletal: No bilateral ankle edema  Psychiatric: Cooperative  Neurologic: Moves all extremities, speech clear  Skin: No rashes, PVD RLE, scattered abrasions BLE      Results Reviewed:  LAB RESULTS:      Lab 24  0638 24  1144 24  1651 24  1146   WBC 4.33 6.30  --  7.84   HEMOGLOBIN 8.6* 8.1*  --  7.7*   HEMATOCRIT 25.8* 25.6*  --  25.0*   PLATELETS 271 214  --  243   NEUTROS ABS  --  4.50  --  5.19   IMMATURE GRANS (ABS)  --  0.02  --  0.02   LYMPHS ABS  --  1.03  --  1.74   MONOS ABS  --  0.52  --  0.79   EOS ABS  --  0.21  --  0.09   MCV 87.5 93.1  --  95.8   PROCALCITONIN  --   --   --  0.16   LACTATE  --   --  0.9 1.0         Lab 24  0638 24  1144 24  1146   SODIUM 140 141 142   POTASSIUM 4.5 4.9 5.4*   CHLORIDE 103 104 107   CO2 25.0 26.0 27.0   ANION GAP 12.0 11.0 8.0   BUN 25* 26* 30*   CREATININE 1.53* 1.43* 1.80*   EGFR 49.8* 54.0* 41.0*   GLUCOSE 89 185* 125*   CALCIUM  8.8 8.9 9.9   MAGNESIUM  --   --  2.1         Lab 02/08/24  0638 02/05/24  1146   TOTAL PROTEIN 6.4 7.0   ALBUMIN 3.4* 3.5   GLOBULIN 3.0 3.5   ALT (SGPT) 26 33   AST (SGOT) 22 21   BILIRUBIN 0.2 0.3   ALK PHOS 81 89         Lab 02/05/24  1651 02/05/24  1146   PROBNP 1,065.0* 1,446.0*   HSTROP T 155* 168*             Lab 02/05/24  1146   FOLATE 13.90   VITAMIN B 12 670         Brief Urine Lab Results  (Last result in the past 365 days)        Color   Clarity   Blood   Leuk Est   Nitrite   Protein   CREAT   Urine HCG        02/05/24 1146 Yellow   Clear   Negative   Negative   Negative   Negative                   Microbiology Results Abnormal       Procedure Component Value - Date/Time    Blood Culture - Blood, Arm, Left [[id number]]  (Normal) Collected: 02/05/24 1320    Lab Status: Preliminary result Specimen: Blood from Arm, Left Updated: 02/08/24 1401     Blood Culture No growth at 3 days    Blood Culture - Blood, Arm, Right [[id number]]  (Normal) Collected: 02/05/24 1320    Lab Status: Preliminary result Specimen: Blood from Arm, Right Updated: 02/08/24 1400     Blood Culture No growth at 3 days    S. Pneumo Ag Urine or CSF - Urine, Urine, Clean Catch [[id number]]  (Normal) Collected: 02/05/24 1718    Lab Status: Final result Specimen: Urine, Clean Catch Updated: 02/06/24 0101     Strep Pneumo Ag Negative    Legionella Antigen, Urine - Urine, Urine, Clean Catch [[id number]]  (Normal) Collected: 02/05/24 1718    Lab Status: Final result Specimen: Urine, Clean Catch Updated: 02/06/24 0101     LEGIONELLA ANTIGEN, URINE Negative    Respiratory Panel PCR w/COVID-19(SARS-CoV-2) GIANNI/SIENNA/ANNA/PAD/COR/ASHIA In-House, NP Swab in UTM/VTM, 2 HR TAT - Swab, Nasopharynx [[id number]]  (Normal) Collected: 02/05/24 1720    Lab Status: Final result Specimen: Swab from Nasopharynx Updated: 02/05/24 1810     ADENOVIRUS, PCR Not Detected     Coronavirus 229E Not Detected     Coronavirus HKU1 Not Detected     Coronavirus NL63 Not Detected      Coronavirus OC43 Not Detected     COVID19 Not Detected     Human Metapneumovirus Not Detected     Human Rhinovirus/Enterovirus Not Detected     Influenza A PCR Not Detected     Influenza B PCR Not Detected     Parainfluenza Virus 1 Not Detected     Parainfluenza Virus 2 Not Detected     Parainfluenza Virus 3 Not Detected     Parainfluenza Virus 4 Not Detected     RSV, PCR Not Detected     Bordetella pertussis pcr Not Detected     Bordetella parapertussis PCR Not Detected     Chlamydophila pneumoniae PCR Not Detected     Mycoplasma pneumo by PCR Not Detected    Narrative:      In the setting of a positive respiratory panel with a viral infection PLUS a negative procalcitonin without other underlying concern for bacterial infection, consider observing off antibiotics or discontinuation of antibiotics and continue supportive care. If the respiratory panel is positive for atypical bacterial infection (Bordetella pertussis, Chlamydophila pneumoniae, or Mycoplasma pneumoniae), consider antibiotic de-escalation to target atypical bacterial infection.    COVID PRE-OP / PRE-PROCEDURE SCREENING ORDER (NO ISOLATION) - Swab, Nasopharynx [[id number]]  (Normal) Collected: 02/05/24 1302    Lab Status: Final result Specimen: Swab from Nasopharynx Updated: 02/05/24 1430    Narrative:      The following orders were created for panel order COVID PRE-OP / PRE-PROCEDURE SCREENING ORDER (NO ISOLATION) - Swab, Nasopharynx.  Procedure                               Abnormality         Status                     ---------                               -----------         ------                     COVID-19 and FLU A/B PCR...[[id number]]  Normal              Final result                 Please view results for these tests on the individual orders.    COVID-19 and FLU A/B PCR, 1 HR TAT - Swab, Nasopharynx [[id number]]  (Normal) Collected: 02/05/24 1302    Lab Status: Final result Specimen: Swab from Nasopharynx Updated: 02/05/24 1430     COVID19  Not Detected     Influenza A PCR Not Detected     Influenza B PCR Not Detected    Narrative:      Fact sheet for providers: https://www.fda.gov/media/653362/download    Fact sheet for patients: https://www.fda.gov/media/043988/download    Test performed by PCR.            No radiology results from the last 24 hrs    Results for orders placed during the hospital encounter of 01/15/24    Adult Transthoracic Echo Complete With Contrast if Necessary Per Protocol    Interpretation Summary    Left ventricular ejection fraction appears to be 56 - 60%.    Left ventricular wall thickness is consistent with hypertrophy.    Estimated right ventricular systolic pressure from tricuspid regurgitation is mildly elevated (35-45 mmHg).    There is a small (1-2cm) pericardial effusion.    No evidence for pericardial tamponade      Current medications:  Scheduled Meds:amLODIPine, 10 mg, Oral, Q24H  ARIPiprazole, 5 mg, Oral, Daily  atorvastatin, 20 mg, Oral, Nightly  brimonidine, 1 drop, Both Eyes, BID   And  timolol, 1 drop, Both Eyes, BID  carvedilol, 25 mg, Oral, BID With Meals  famotidine, 20 mg, Oral, Daily  ferrous sulfate, 325 mg, Oral, Daily With Breakfast  FLUoxetine, 20 mg, Oral, BID  hydrALAZINE, 50 mg, Oral, TID  insulin lispro, 2-7 Units, Subcutaneous, 4x Daily AC & at Bedtime  melatonin, 5 mg, Oral, Nightly  QUEtiapine, 12.5 mg, Oral, Daily  senna-docusate sodium, 2 tablet, Oral, BID  sodium chloride, 10 mL, Intravenous, Q12H  sterile water (preservative free), , ,   thiamine, 100 mg, Oral, Daily  torsemide, 10 mg, Oral, Daily  traZODone, 25 mg, Oral, Nightly  Valproic Acid, 375 mg, Oral, Q12H      Continuous Infusions:   PRN Meds:.  acetaminophen    senna-docusate sodium **AND** polyethylene glycol **AND** bisacodyl **AND** bisacodyl    dextrose    dextrose    glucagon (human recombinant)    hydrOXYzine    ipratropium-albuterol    ondansetron    sodium chloride    sodium chloride    sodium chloride    sterile water  (preservative free)    Assessment & Plan   Assessment & Plan     Active Hospital Problems    Diagnosis  POA    **Encephalopathy [G93.40]  Yes    Bilateral pneumonia [J18.9]  Yes    Acute respiratory failure with hypoxia [J96.01]  Yes    Chronic kidney disease [N18.9]  Yes    Dementia [F03.90]  Yes    Anemia of chronic disease [D63.8]  Yes    Essential hypertension [I10]  Yes    Type 2 diabetes mellitus [E11.9]  Yes      Resolved Hospital Problems   No resolved problems to display.        Brief Hospital Course to date:  Omkar Nava is a 68 y.o. male with PMHx significant for HFrEF, DMII, chronic anemia, CKDIII, HTN, Hx of Osteomyelitis s/p transmetatarsal amputation, dementia, HTN, recent admission for PNA, heart failure exacerbation, and JAYY, who is current resident of [facility] who presented with AMS and hypoxia. CT chest was most consistent with volume overload. Patient was given IV diuresis then transitioned to PO. Patient had a tongue laceration on admission and there was concern for seizure. Neurology was consulted.      This patient's problems and plans were partially entered by my partner and updated as appropriate by me 02/09/24.       AMS  H/o dementia with psychosis  -no prior hx of seizures, does take Depakote, however family states it is not for seizures  -EEG negative for seizures  -risk for hospital delirium given dementia  -Neurology following for medication recommendations  -s/p load w/depakote 1.5g IV x 1 per Neurology  -Continue Depakote DR 375mg twice daily for behavior  -Continue trazadone nightly, hydroxyzine prn  -s/p Geodon x 1 2/7 for increased agitation,   -trial of Abilify daily  -Seroquel BID added 2/8, restlessless improving  -remains in restraints, goal to discontinue restraints as soon as reasonable     Acute on Chronic Hypoxic Respiratory Failure  Possible Exacerbation HFpEF  - baseline O2 is 2L  - was on 3L, 78% on room air on admission  - recent admission for pneumonia  w/positive MRSA PCR, discharged on linezolid 1/21/24  - Still MRSA positive, procal wnl, proBNP 1065  - Vanc/cefepime discontinued  - CT chest most consistent with volume overload  - s/p lasix 60mg IV on 2/5  - Echo done on 1/16/25 with EF 56-60%  - SLP evaluation, s/p MBS that showed moderate oral and mild pharyngeal dysphagia  - modified diet  - cards consulted per family request, evaluted on 2/6 and signed off, recommendation for continued diuresis with change to PO diuretic on 2/7 and outpatient follow-up, signed off 2/8  - resume home torsemide 10 mg daily 2/7    JAYY on CKDIII, suspect cardiorenal syndrome  - baseline around 1.3, was 1.8 on admission  - avoid nephrotoxins  - creatinine trending up, encourage PO intake and continue to monitor  - creatinine 1.68 on 2/9  - gentle IV fluids x 16 hrs  - AM BMP     Elevated Troponin, likely demand in setting of above  - EKG appears unchanged from prior, suspect due to above  - troponin trended down     HTN  - continue coreg, norvasc, hydralazine     DMII  - SSI coverage for now       Expected Discharge Location and Transportation: [facility], EMS  Expected Discharge   Expected Discharge Date: 2/10/2024; Expected Discharge Time:      DVT prophylaxis:  Mechanical DVT prophylaxis orders are present.         AM-PAC 6 Clicks Score (PT): 14 (02/08/24 1503)    CODE STATUS:   Code Status and Medical Interventions:   Ordered at: 02/05/24 1326     Level Of Support Discussed With:    Health Care Surrogate     Code Status (Patient has no pulse and is not breathing):    CPR (Attempt to Resuscitate)     Medical Interventions (Patient has pulse or is breathing):    Full Support       Kari Teague, APRN  02/09/24

## 2024-02-09 NOTE — CASE MANAGEMENT/SOCIAL WORK
Continued Stay Note  [facility]     Patient Name: Omkar Nava  MRN: [id number]  Today's Date: 2/9/2024    Admit Date: 2/5/2024    Plan: SNF   Discharge Plan       Row Name 02/09/24 1119       Plan    Plan Comments Pt is able to discharge back to [facility] once ready. Pt has been able to be out of 4 point restraints but at this time still requiring 2 wrist restraints. Per multidisciplinary rounds, working on medication regimen to assist with getting pt out of restraints. Once pt out of restraints for 24-48 hours, will be able to return to [facility]. MSW continues to follow.                   Discharge Codes    No documentation.                 Expected Discharge Date and Time       Expected Discharge Date Expected Discharge Time    Feb 10, 2024               LUCINDA Maya

## 2024-02-09 NOTE — PROGRESS NOTES
[facility] Neurology    Progress Note    Patient Name: Omkar Nava  : 1955  MRN: [id number]  Primary Care Physician:  Deann Cao MD  Date of admission: 2024    Subjective     Chief Complaint: Altered mental status    History of Present Illness   Patient seen resting comfortably in bed.  Was able to state his name.  Calm.    Review of Systems   General: Negative for fever, nausea, or vomiting.   Neurological: Negative for headache, pain, or weakness.     Objective     Physical Exam  Vitals and nursing note reviewed.   Constitutional:       General: He is not in acute distress.     Appearance: He is not ill-appearing.   Eyes:      Extraocular Movements: Extraocular movements intact.      Pupils: Pupils are equal, round, and reactive to light.      Comments: No nystagmus or deviated gaze noted   Cardiovascular:      Rate and Rhythm: Normal rate.   Pulmonary:      Effort: Pulmonary effort is normal.   Neurological:      Mental Status: He is alert. Mental status is at baseline.      Cranial Nerves: No facial asymmetry.      Sensory: No sensory deficit.      Motor: No weakness or seizure activity.          Vitals:   Temp:  [96.5 °F (35.8 °C)-97.8 °F (36.6 °C)] 97.8 °F (36.6 °C)  Heart Rate:  [50-65] 63  Resp:  [16-19] 16  BP: (108-163)/(56-74) 163/71    Current Medications    Current Facility-Administered Medications:     acetaminophen (TYLENOL) tablet 650 mg, 650 mg, Oral, Q6H PRN, Sarah Gann DO    amLODIPine (NORVASC) tablet 10 mg, 10 mg, Oral, Q24H, Sarah Gnan DO, 10 mg at 24 0857    ARIPiprazole (ABILIFY) tablet 5 mg, 5 mg, Oral, Daily, Mars April K, APRN, 5 mg at 24 08    atorvastatin (LIPITOR) tablet 20 mg, 20 mg, Oral, Nightly, Sarah Gann DO, 20 mg at 24    sennosides-docusate (PERICOLACE) 8.6-50 MG per tablet 2 tablet, 2 tablet, Oral, BID, 2 tablet at 24 **AND** polyethylene glycol (MIRALAX) packet 17 g, 17 g, Oral,  Daily PRN **AND** bisacodyl (DULCOLAX) EC tablet 5 mg, 5 mg, Oral, Daily PRN **AND** bisacodyl (DULCOLAX) suppository 10 mg, 10 mg, Rectal, Daily PRN, Sarah Gann, DO    brimonidine (ALPHAGAN) 0.2 % ophthalmic solution 1 drop, 1 drop, Both Eyes, BID, 1 drop at 02/08/24 2034 **AND** timolol (TIMOPTIC) 0.5 % ophthalmic solution 1 drop, 1 drop, Both Eyes, BID, Sarah Gann, DO, 1 drop at 02/08/24 2034    carvedilol (COREG) tablet 25 mg, 25 mg, Oral, BID With Meals, Sarah Gann DO, 25 mg at 02/07/24 1838    dextrose (D50W) (25 g/50 mL) IV injection 25 g, 25 g, Intravenous, Q15 Min PRN, Sarah Gann DO    dextrose (GLUTOSE) oral gel 15 g, 15 g, Oral, Q15 Min PRN, Sarah Gann,     famotidine (PEPCID) tablet 20 mg, 20 mg, Oral, Daily, Ijeoma Kuhn, PharmD, 20 mg at 02/07/24 0856    ferrous sulfate tablet 325 mg, 325 mg, Oral, Daily With Breakfast, Sarah Gann DO, 325 mg at 02/08/24 0812    FLUoxetine (PROzac) capsule 20 mg, 20 mg, Oral, BID, Sarah Gann DO, 20 mg at 02/08/24 2034    glucagon (GLUCAGEN) injection 1 mg, 1 mg, Intramuscular, Q15 Min PRN, Sarah Gann,     hydrALAZINE (APRESOLINE) tablet 50 mg, 50 mg, Oral, TID, Sarah Gann DO, 50 mg at 02/07/24 2035    hydrOXYzine (ATARAX) tablet 50 mg, 50 mg, Oral, Q6H PRN, Sarah Gann, DO, 50 mg at 02/06/24 1814    Insulin Lispro (humaLOG) injection 2-7 Units, 2-7 Units, Subcutaneous, 4x Daily AC & at Bedtime, Sarah Gann DO, 2 Units at 02/08/24 1337    ipratropium-albuterol (DUO-NEB) nebulizer solution 3 mL, 3 mL, Nebulization, Q4H PRN, Alexandra De Leon MD    lactated ringers infusion, 50 mL/hr, Intravenous, Continuous, Kari Teague APRN    melatonin tablet 5 mg, 5 mg, Oral, Nightly, Sarah Gann DO, 5 mg at 02/08/24 2034    ondansetron (ZOFRAN) injection 4 mg, 4 mg, Intravenous, Q6H PRN, Sarah Gann DO    QUEtiapine (SEROquel) tablet 12.5 mg, 12.5 mg, Oral, Daily, Mars, April  "NARGIS GARDNER, 12.5 mg at 02/08/24 1417    sodium chloride 0.9 % flush 10 mL, 10 mL, Intravenous, PRN, Sarah Gann R, DO    sodium chloride 0.9 % flush 10 mL, 10 mL, Intravenous, Q12H, Sarah Gann R, DO, 10 mL at 02/08/24 2034    sodium chloride 0.9 % flush 10 mL, 10 mL, Intravenous, PRN, Sarah Gann R, DO, 10 mL at 02/07/24 0856    sodium chloride 0.9 % infusion 40 mL, 40 mL, Intravenous, PRN, Sarah Gann R, DO    sterile water (preservative free) injection  - ADS Override Pull, , , ,     thiamine (VITAMIN B-1) tablet 100 mg, 100 mg, Oral, Daily, Sarah Gann, , 100 mg at 02/08/24 0812    torsemide (DEMADEX) tablet 10 mg, 10 mg, Oral, Daily, Kari Teague APRN    traZODone (DESYREL) tablet 25 mg, 25 mg, Oral, Nightly, Sarah Gann, DO, 25 mg at 02/08/24 2033    Valproic Acid (DEPAKENE) syrup 375 mg, 375 mg, Oral, Q12H, Mila Crews APRN, 375 mg at 02/08/24 2145    Laboratory Results:   Lab Results   Component Value Date    GLUCOSE 120 (H) 02/09/2024    CALCIUM 9.0 02/09/2024     02/09/2024    K 4.3 02/09/2024    CO2 27.0 02/09/2024     02/09/2024    BUN 30 (H) 02/09/2024    CREATININE 1.68 (H) 02/09/2024    EGFRIFAFRI >60 07/25/2022    EGFRIFNONA >60 07/25/2022    BCR 17.9 02/09/2024    ANIONGAP 9.0 02/09/2024     Lab Results   Component Value Date    WBC 4.33 02/08/2024    HGB 8.6 (L) 02/08/2024    HCT 25.8 (L) 02/08/2024    MCV 87.5 02/08/2024     02/08/2024     No results found for: \"CHOL\"  Lab Results   Component Value Date    HDL 44 06/25/2022     Lab Results   Component Value Date    .4 (H) 06/25/2022     Lab Results   Component Value Date    TRIG 58 06/25/2022     Lab Results   Component Value Date    HGBA1C 7.00 (H) 10/16/2022     Lab Results   Component Value Date    INR 1.08 10/18/2022    INR 1.02 10/14/2022    INR 1.17 (H) 09/29/2022    PROTIME 13.9 10/18/2022    PROTIME 13.3 10/14/2022    PROTIME 14.8 (H) 09/29/2022     Lab Results   Component " Value Date    FOLATE 13.90 02/05/2024     Lab Results   Component Value Date    [id number] 670 02/05/2024             Assessment / Plan   Brief Patient Summary:  Omkar Nava is a 68 y.o. male with a past medical history of HFrEF, T2DM, chronic edema, CKD stage III, HTN, osteomyelitis s/p transmetatarsal amputation, dementia, HTN who presented from his living facility [facility] with complaint of altered mental status.      EEG showed no focal features or epileptic activity.  Nonspecific mild diffuse cerebral dysfunction.     CT head with no acute intracranial abnormalities.  Did show diffuse cortical atrophy with chronic microvascular changes.     Plan:   Altered mental status  Dementia  Questionable history of seizure  Continue Depakote  mg twice daily, per family this is for behavior and not seizure disorder.   Attempt to remove restraints as tolerated.  Trough VPA attempted patient refused blood draw  Continue Abilify 5 mg daily  Continue Seroquel 12.5 twice daily  Continue thiamine 100 mg daily  Continue trazodone 25 mg nightly  Continue fall/delirium precautions  Continue seizure precautions  If patient has witnessed seizure-like activity load with IV Depakote 1000 mg and increase maintenance dose to 500 mg twice daily.  IV Valium as needed for seizure-like activity  Neurochecks every shift  General neurology will follow-up on Monday.  Please feel free to reach out if needed over the weekend.      I have discussed the above with the patient, bedside RN Dr. De Leon  Time spent with patient: 60 minutes in face-to-face evaluation and management of the patient.    Copied text in this note has been reviewed and is accurate as of 02/09/24.     NARGIS Sheridan

## 2024-02-10 LAB
ANION GAP SERPL CALCULATED.3IONS-SCNC: 12 MMOL/L (ref 5–15)
BACTERIA SPEC AEROBE CULT: NORMAL
BACTERIA SPEC AEROBE CULT: NORMAL
BUN SERPL-MCNC: 24 MG/DL (ref 8–23)
BUN/CREAT SERPL: 15.3 (ref 7–25)
CALCIUM SPEC-SCNC: 9.1 MG/DL (ref 8.6–10.5)
CHLORIDE SERPL-SCNC: 104 MMOL/L (ref 98–107)
CO2 SERPL-SCNC: 22 MMOL/L (ref 22–29)
CREAT SERPL-MCNC: 1.57 MG/DL (ref 0.76–1.27)
EGFRCR SERPLBLD CKD-EPI 2021: 48.3 ML/MIN/1.73
GLUCOSE BLDC GLUCOMTR-MCNC: 113 MG/DL (ref 70–130)
GLUCOSE BLDC GLUCOMTR-MCNC: 120 MG/DL (ref 70–130)
GLUCOSE BLDC GLUCOMTR-MCNC: 187 MG/DL (ref 70–130)
GLUCOSE BLDC GLUCOMTR-MCNC: 191 MG/DL (ref 70–130)
GLUCOSE SERPL-MCNC: 114 MG/DL (ref 65–99)
POTASSIUM SERPL-SCNC: 4.5 MMOL/L (ref 3.5–5.2)
SODIUM SERPL-SCNC: 138 MMOL/L (ref 136–145)

## 2024-02-10 PROCEDURE — 80048 BASIC METABOLIC PNL TOTAL CA: CPT | Performed by: NURSE PRACTITIONER

## 2024-02-10 PROCEDURE — 25010000002 HEPARIN (PORCINE) PER 1000 UNITS: Performed by: NURSE PRACTITIONER

## 2024-02-10 PROCEDURE — 63710000001 INSULIN LISPRO (HUMAN) PER 5 UNITS: Performed by: INTERNAL MEDICINE

## 2024-02-10 PROCEDURE — 82948 REAGENT STRIP/BLOOD GLUCOSE: CPT

## 2024-02-10 RX ADMIN — QUETIAPINE FUMARATE 12.5 MG: 25 TABLET ORAL at 08:40

## 2024-02-10 RX ADMIN — FERROUS SULFATE TAB 325 MG (65 MG ELEMENTAL FE) 325 MG: 325 (65 FE) TAB at 08:43

## 2024-02-10 RX ADMIN — HYDROXYZINE HYDROCHLORIDE 50 MG: 25 TABLET, FILM COATED ORAL at 13:21

## 2024-02-10 RX ADMIN — BRIMONIDINE TARTRATE 1 DROP: 2 SOLUTION OPHTHALMIC at 08:41

## 2024-02-10 RX ADMIN — TORSEMIDE 10 MG: 10 TABLET ORAL at 08:43

## 2024-02-10 RX ADMIN — TIMOLOL MALEATE 1 DROP: 5 SOLUTION/ DROPS OPHTHALMIC at 08:41

## 2024-02-10 RX ADMIN — BRIMONIDINE TARTRATE 1 DROP: 2 SOLUTION OPHTHALMIC at 21:17

## 2024-02-10 RX ADMIN — HEPARIN SODIUM 5000 UNITS: 5000 INJECTION INTRAVENOUS; SUBCUTANEOUS at 08:44

## 2024-02-10 RX ADMIN — ATORVASTATIN CALCIUM 20 MG: 20 TABLET, FILM COATED ORAL at 21:15

## 2024-02-10 RX ADMIN — INSULIN LISPRO 2 UNITS: 100 INJECTION, SOLUTION INTRAVENOUS; SUBCUTANEOUS at 21:16

## 2024-02-10 RX ADMIN — HYDRALAZINE HYDROCHLORIDE 50 MG: 50 TABLET, FILM COATED ORAL at 08:43

## 2024-02-10 RX ADMIN — CARVEDILOL 25 MG: 12.5 TABLET, FILM COATED ORAL at 08:42

## 2024-02-10 RX ADMIN — SENNOSIDES AND DOCUSATE SODIUM 2 TABLET: 8.6; 5 TABLET ORAL at 08:43

## 2024-02-10 RX ADMIN — INSULIN LISPRO 2 UNITS: 100 INJECTION, SOLUTION INTRAVENOUS; SUBCUTANEOUS at 12:20

## 2024-02-10 RX ADMIN — AMLODIPINE BESYLATE 10 MG: 10 TABLET ORAL at 08:43

## 2024-02-10 RX ADMIN — FAMOTIDINE 20 MG: 20 TABLET, FILM COATED ORAL at 08:43

## 2024-02-10 RX ADMIN — QUETIAPINE FUMARATE 25 MG: 25 TABLET ORAL at 21:15

## 2024-02-10 RX ADMIN — Medication 5 MG: at 21:15

## 2024-02-10 RX ADMIN — ARIPIPRAZOLE 5 MG: 5 TABLET ORAL at 08:40

## 2024-02-10 RX ADMIN — FLUOXETINE HYDROCHLORIDE 20 MG: 20 CAPSULE ORAL at 08:40

## 2024-02-10 RX ADMIN — HEPARIN SODIUM 5000 UNITS: 5000 INJECTION INTRAVENOUS; SUBCUTANEOUS at 21:16

## 2024-02-10 RX ADMIN — Medication 10 ML: at 21:16

## 2024-02-10 RX ADMIN — THIAMINE HCL TAB 100 MG 100 MG: 100 TAB at 08:42

## 2024-02-10 RX ADMIN — Medication 10 ML: at 08:44

## 2024-02-10 RX ADMIN — HYDRALAZINE HYDROCHLORIDE 50 MG: 50 TABLET, FILM COATED ORAL at 21:14

## 2024-02-10 RX ADMIN — TRAZODONE HYDROCHLORIDE 25 MG: 50 TABLET ORAL at 21:14

## 2024-02-10 RX ADMIN — SENNOSIDES AND DOCUSATE SODIUM 2 TABLET: 8.6; 5 TABLET ORAL at 21:16

## 2024-02-10 RX ADMIN — VALPROIC ACID 375 MG: 250 SOLUTION ORAL at 08:40

## 2024-02-10 RX ADMIN — FLUOXETINE HYDROCHLORIDE 20 MG: 20 CAPSULE ORAL at 21:16

## 2024-02-10 NOTE — PLAN OF CARE
Problem: Fall Injury Risk  Goal: Absence of Fall and Fall-Related Injury  Intervention: Promote Injury-Free Environment  Recent Flowsheet Documentation  Taken 2/9/2024 2200 by Renetta Hernandez RN  Safety Promotion/Fall Prevention:   activity supervised   safety round/check completed   clutter free environment maintained   assistive device/personal items within reach   fall prevention program maintained   elopement precautions   lighting adjusted  Taken 2/9/2024 2000 by Renetta Hernandez RN  Safety Promotion/Fall Prevention:   activity supervised   assistive device/personal items within reach   clutter free environment maintained   safety round/check completed     Problem: Fall Injury Risk  Goal: Absence of Fall and Fall-Related Injury  Intervention: Identify and Manage Contributors  Recent Flowsheet Documentation  Taken 2/9/2024 2200 by Renetta Hernandez RN  Medication Review/Management: medications reviewed  Taken 2/9/2024 2000 by Renetta Hernandez RN  Medication Review/Management: medications reviewed  Self-Care Promotion: BADL personal objects within reach     Problem: Skin Injury Risk Increased  Goal: Skin Health and Integrity  Outcome: Ongoing, Progressing   Goal Outcome Evaluation:

## 2024-02-10 NOTE — PLAN OF CARE
Goal Outcome Evaluation:            Patient taken out of restraints this am. Patient did well until around 4pm when patient became agitated. Orders placed and restraints replaced. Patient continues to be a little agitated.

## 2024-02-10 NOTE — PROGRESS NOTES
[facility] Medicine Services  PROGRESS NOTE    Patient Name: Omkar Nava  : 1955  MRN: [id number]    Date of Admission: 2024  Primary Care Physician: Deann Cao MD    Subjective   Subjective     CC:  Altered mental status    HPI:  Remains in restraints.  No acute events overnight.      Objective   Objective     Vital Signs:   Temp:  [97.5 °F (36.4 °C)-98.4 °F (36.9 °C)] 97.5 °F (36.4 °C)  Heart Rate:  [51-68] 51  Resp:  [16-18] 16  BP: (117-169)/(47-88) 169/80     Physical Exam:  Constitutional: Awake and alert, in restraints  Respiratory: Clear to auscultation bilaterally, respiratory effort normal   Cardiovascular: RR  Gastrointestinal: Positive bowel sounds, soft, nontender, nondistended  Musculoskeletal: No bilateral ankle edema  Psychiatric: Appropriate affect, cooperative  Neurologic: Oriented to person but otherwise does not participate or ask appropriate questions    Results Reviewed:  LAB RESULTS:      Lab 24  0638 24  1144 24  1651 24  1146   WBC 4.33 6.30  --  7.84   HEMOGLOBIN 8.6* 8.1*  --  7.7*   HEMATOCRIT 25.8* 25.6*  --  25.0*   PLATELETS 271 214  --  243   NEUTROS ABS  --  4.50  --  5.19   IMMATURE GRANS (ABS)  --  0.02  --  0.02   LYMPHS ABS  --  1.03  --  1.74   MONOS ABS  --  0.52  --  0.79   EOS ABS  --  0.21  --  0.09   MCV 87.5 93.1  --  95.8   PROCALCITONIN  --   --   --  0.16   LACTATE  --   --  0.9 1.0         Lab 02/10/24  0351 24  0616 24  0638 24  1144 24  1146   SODIUM 138 138 140 141 142   POTASSIUM 4.5 4.3 4.5 4.9 5.4*   CHLORIDE 104 102 103 104 107   CO2 22.0 27.0 25.0 26.0 27.0   ANION GAP 12.0 9.0 12.0 11.0 8.0   BUN 24* 30* 25* 26* 30*   CREATININE 1.57* 1.68* 1.53* 1.43* 1.80*   EGFR 48.3* 44.5* 49.8* 54.0* 41.0*   GLUCOSE 114* 120* 89 185* 125*   CALCIUM 9.1 9.0 8.8 8.9 9.9   MAGNESIUM  --   --   --   --  2.1         Lab 24  0638 24  1146   TOTAL PROTEIN 6.4 7.0    ALBUMIN 3.4* 3.5   GLOBULIN 3.0 3.5   ALT (SGPT) 26 33   AST (SGOT) 22 21   BILIRUBIN 0.2 0.3   ALK PHOS 81 89         Lab 02/05/24  1651 02/05/24  1146   PROBNP 1,065.0* 1,446.0*   HSTROP T 155* 168*             Lab 02/05/24  1146   FOLATE 13.90   VITAMIN B 12 670         Brief Urine Lab Results  (Last result in the past 365 days)        Color   Clarity   Blood   Leuk Est   Nitrite   Protein   CREAT   Urine HCG        02/05/24 1146 Yellow   Clear   Negative   Negative   Negative   Negative                   Microbiology Results Abnormal       Procedure Component Value - Date/Time    Blood Culture - Blood, Arm, Right [[id number]]  (Normal) Collected: 02/05/24 1320    Lab Status: Preliminary result Specimen: Blood from Arm, Right Updated: 02/09/24 1400     Blood Culture No growth at 4 days    Blood Culture - Blood, Arm, Left [[id number]]  (Normal) Collected: 02/05/24 1320    Lab Status: Preliminary result Specimen: Blood from Arm, Left Updated: 02/09/24 1400     Blood Culture No growth at 4 days    S. Pneumo Ag Urine or CSF - Urine, Urine, Clean Catch [[id number]]  (Normal) Collected: 02/05/24 1718    Lab Status: Final result Specimen: Urine, Clean Catch Updated: 02/06/24 0101     Strep Pneumo Ag Negative    Legionella Antigen, Urine - Urine, Urine, Clean Catch [[id number]]  (Normal) Collected: 02/05/24 1718    Lab Status: Final result Specimen: Urine, Clean Catch Updated: 02/06/24 0101     LEGIONELLA ANTIGEN, URINE Negative    Respiratory Panel PCR w/COVID-19(SARS-CoV-2) GIANNI/SIENNA/ANNA/PAD/COR/ASHIA In-House, NP Swab in UTM/VTM, 2 HR TAT - Swab, Nasopharynx [[id number]]  (Normal) Collected: 02/05/24 1720    Lab Status: Final result Specimen: Swab from Nasopharynx Updated: 02/05/24 1810     ADENOVIRUS, PCR Not Detected     Coronavirus 229E Not Detected     Coronavirus HKU1 Not Detected     Coronavirus NL63 Not Detected     Coronavirus OC43 Not Detected     COVID19 Not Detected     Human Metapneumovirus Not Detected     Human  Rhinovirus/Enterovirus Not Detected     Influenza A PCR Not Detected     Influenza B PCR Not Detected     Parainfluenza Virus 1 Not Detected     Parainfluenza Virus 2 Not Detected     Parainfluenza Virus 3 Not Detected     Parainfluenza Virus 4 Not Detected     RSV, PCR Not Detected     Bordetella pertussis pcr Not Detected     Bordetella parapertussis PCR Not Detected     Chlamydophila pneumoniae PCR Not Detected     Mycoplasma pneumo by PCR Not Detected    Narrative:      In the setting of a positive respiratory panel with a viral infection PLUS a negative procalcitonin without other underlying concern for bacterial infection, consider observing off antibiotics or discontinuation of antibiotics and continue supportive care. If the respiratory panel is positive for atypical bacterial infection (Bordetella pertussis, Chlamydophila pneumoniae, or Mycoplasma pneumoniae), consider antibiotic de-escalation to target atypical bacterial infection.    COVID PRE-OP / PRE-PROCEDURE SCREENING ORDER (NO ISOLATION) - Swab, Nasopharynx [[id number]]  (Normal) Collected: 02/05/24 1302    Lab Status: Final result Specimen: Swab from Nasopharynx Updated: 02/05/24 1430    Narrative:      The following orders were created for panel order COVID PRE-OP / PRE-PROCEDURE SCREENING ORDER (NO ISOLATION) - Swab, Nasopharynx.  Procedure                               Abnormality         Status                     ---------                               -----------         ------                     COVID-19 and FLU A/B PCR...[[id number]]  Normal              Final result                 Please view results for these tests on the individual orders.    COVID-19 and FLU A/B PCR, 1 HR TAT - Swab, Nasopharynx [[id number]]  (Normal) Collected: 02/05/24 1302    Lab Status: Final result Specimen: Swab from Nasopharynx Updated: 02/05/24 1430     COVID19 Not Detected     Influenza A PCR Not Detected     Influenza B PCR Not Detected    Narrative:      Fact  sheet for providers: https://www.fda.gov/media/363859/download    Fact sheet for patients: https://www.fda.gov/media/457525/download    Test performed by PCR.            No radiology results from the last 24 hrs    Results for orders placed during the hospital encounter of 01/15/24    Adult Transthoracic Echo Complete With Contrast if Necessary Per Protocol    Interpretation Summary    Left ventricular ejection fraction appears to be 56 - 60%.    Left ventricular wall thickness is consistent with hypertrophy.    Estimated right ventricular systolic pressure from tricuspid regurgitation is mildly elevated (35-45 mmHg).    There is a small (1-2cm) pericardial effusion.    No evidence for pericardial tamponade      Current medications:  Scheduled Meds:amLODIPine, 10 mg, Oral, Q24H  ARIPiprazole, 5 mg, Oral, Daily  atorvastatin, 20 mg, Oral, Nightly  brimonidine, 1 drop, Both Eyes, BID   And  timolol, 1 drop, Both Eyes, BID  carvedilol, 25 mg, Oral, BID With Meals  famotidine, 20 mg, Oral, Daily  ferrous sulfate, 325 mg, Oral, Daily With Breakfast  FLUoxetine, 20 mg, Oral, BID  heparin (porcine), 5,000 Units, Subcutaneous, Q12H  hydrALAZINE, 50 mg, Oral, TID  insulin lispro, 2-7 Units, Subcutaneous, 4x Daily AC & at Bedtime  melatonin, 5 mg, Oral, Nightly  QUEtiapine, 12.5 mg, Oral, Daily  QUEtiapine, 25 mg, Oral, Nightly  senna-docusate sodium, 2 tablet, Oral, BID  sodium chloride, 10 mL, Intravenous, Q12H  sterile water (preservative free), , ,   thiamine, 100 mg, Oral, Daily  torsemide, 10 mg, Oral, Daily  traZODone, 25 mg, Oral, Nightly  Valproic Acid, 375 mg, Oral, Q12H      Continuous Infusions:   PRN Meds:.  acetaminophen    senna-docusate sodium **AND** polyethylene glycol **AND** bisacodyl **AND** bisacodyl    dextrose    dextrose    glucagon (human recombinant)    hydrALAZINE    hydrOXYzine    ipratropium-albuterol    ondansetron    sodium chloride    sodium chloride    sodium chloride    sterile water  (preservative free)    Assessment & Plan   Assessment & Plan     Active Hospital Problems    Diagnosis  POA    **Encephalopathy [G93.40]  Yes    Bilateral pneumonia [J18.9]  Yes    Acute respiratory failure with hypoxia [J96.01]  Yes    Chronic kidney disease [N18.9]  Yes    Dementia [F03.90]  Yes    Anemia of chronic disease [D63.8]  Yes    Essential hypertension [I10]  Yes    Type 2 diabetes mellitus [E11.9]  Yes      Resolved Hospital Problems   No resolved problems to display.        Brief Hospital Course to date:  Omkar Nava is a 68 y.o. male with PMHx significant for HFrEF, DMII, chronic anemia, CKDIII, HTN, Hx of Osteomyelitis s/p transmetatarsal amputation, dementia, HTN, recent admission for PNA, heart failure exacerbation, and JAYY, who is current resident of [facility] who presented with AMS and hypoxia. CT chest was most consistent with volume overload. Patient was given IV diuresis then transitioned to PO. Patient had a tongue laceration on admission and there was concern for seizure. Neurology was consulted.      AMS  H/o dementia with psychosis  -no prior hx of seizures, does take Depakote, however family states it is not for seizures  -EEG negative for seizures  -s/p Geodon x 1 2/7 for increased agitation,   -s/p load w/depakote 1.5g IV x 1 per Neurology. Continue Depakote DR 375mg twice daily for behavior  -Continue trazadone nightly, hydroxyzine prn  -Continue trial of Abilify daily  - Continue Seroquel BID  -remains in restraints, goal to discontinue restraints as soon as reasonable  - Neurology following.      Acute on Chronic Hypoxic Respiratory Failure  Possible Exacerbation HFpEF  - baseline O2 is 2L, was on 3L, 78% on room air on admission  - Echo done on 1/16/25 with EF 56-60%  - recent admission for pneumonia w/positive MRSA PCR, discharged on linezolid 1/21/24  - CT chest most consistent with volume overload  - s/p lasix 60mg IV on 2/5  - SLP evaluation, s/p MBS that showed moderate  oral and mild pharyngeal dysphagia, continue modified diet  -Cards seen during admission per family request.  Recommended diuresis and outpatient follow-up.  -Continue home torsemide     JAYY on CKDIII, suspect cardiorenal syndrome  - baseline around 1.3, was 1.8 on admission, improved but still remains elevated 1.57 this morning  - avoid nephrotoxins  - AM BMP     Elevated Troponin, likely demand in setting of above  - EKG appears unchanged from prior, suspect due to above  - troponin trended down     HTN  - continue coreg, norvasc, hydralazine     DMII  - SSI coverage for now        Expected Discharge Location and Transportation: [facility], EMS  Expected Discharge   Expected Discharge Date: 2/10/2024; Expected Discharge Time:      DVT prophylaxis:  Medical DVT prophylaxis orders are present.         AM-PAC 6 Clicks Score (PT): 14 (02/09/24 2000)    CODE STATUS:   Code Status and Medical Interventions:   Ordered at: 02/05/24 1326     Level Of Support Discussed With:    Health Care Surrogate     Code Status (Patient has no pulse and is not breathing):    CPR (Attempt to Resuscitate)     Medical Interventions (Patient has pulse or is breathing):    Full Support     This patient's problems and plans were partially entered by my partner and updated as appropriate by me 02/10/24. Today is my first day evaluating this patient's active medical problems. I Personally reviewed chart and adjusted note to reflect daily changes in management/clinical condition. Copied text in this note has been reviewed and is accurate as of  02/10/24      Milli Joyce MD  02/10/24

## 2024-02-11 LAB
ANION GAP SERPL CALCULATED.3IONS-SCNC: 10 MMOL/L (ref 5–15)
BUN SERPL-MCNC: 24 MG/DL (ref 8–23)
BUN/CREAT SERPL: 14.7 (ref 7–25)
CALCIUM SPEC-SCNC: 9.2 MG/DL (ref 8.6–10.5)
CHLORIDE SERPL-SCNC: 104 MMOL/L (ref 98–107)
CO2 SERPL-SCNC: 26 MMOL/L (ref 22–29)
CREAT SERPL-MCNC: 1.63 MG/DL (ref 0.76–1.27)
EGFRCR SERPLBLD CKD-EPI 2021: 46.2 ML/MIN/1.73
GLUCOSE BLDC GLUCOMTR-MCNC: 148 MG/DL (ref 70–130)
GLUCOSE BLDC GLUCOMTR-MCNC: 149 MG/DL (ref 70–130)
GLUCOSE BLDC GLUCOMTR-MCNC: 260 MG/DL (ref 70–130)
GLUCOSE BLDC GLUCOMTR-MCNC: 280 MG/DL (ref 70–130)
GLUCOSE SERPL-MCNC: 142 MG/DL (ref 65–99)
POTASSIUM SERPL-SCNC: 4.4 MMOL/L (ref 3.5–5.2)
SODIUM SERPL-SCNC: 140 MMOL/L (ref 136–145)

## 2024-02-11 PROCEDURE — 63710000001 INSULIN LISPRO (HUMAN) PER 5 UNITS: Performed by: INTERNAL MEDICINE

## 2024-02-11 PROCEDURE — 82948 REAGENT STRIP/BLOOD GLUCOSE: CPT

## 2024-02-11 PROCEDURE — 25010000002 HEPARIN (PORCINE) PER 1000 UNITS: Performed by: NURSE PRACTITIONER

## 2024-02-11 PROCEDURE — 80048 BASIC METABOLIC PNL TOTAL CA: CPT | Performed by: INTERNAL MEDICINE

## 2024-02-11 RX ORDER — CARVEDILOL 12.5 MG/1
12.5 TABLET ORAL 2 TIMES DAILY WITH MEALS
Status: DISCONTINUED | OUTPATIENT
Start: 2024-02-11 | End: 2024-02-13 | Stop reason: HOSPADM

## 2024-02-11 RX ORDER — QUETIAPINE FUMARATE 25 MG/1
25 TABLET, FILM COATED ORAL EVERY 8 HOURS PRN
Status: DISCONTINUED | OUTPATIENT
Start: 2024-02-11 | End: 2024-02-13 | Stop reason: HOSPADM

## 2024-02-11 RX ADMIN — FLUOXETINE HYDROCHLORIDE 20 MG: 20 CAPSULE ORAL at 20:39

## 2024-02-11 RX ADMIN — VALPROIC ACID 375 MG: 250 SOLUTION ORAL at 08:03

## 2024-02-11 RX ADMIN — Medication 10 ML: at 20:42

## 2024-02-11 RX ADMIN — FAMOTIDINE 20 MG: 20 TABLET, FILM COATED ORAL at 08:05

## 2024-02-11 RX ADMIN — FLUOXETINE HYDROCHLORIDE 20 MG: 20 CAPSULE ORAL at 08:05

## 2024-02-11 RX ADMIN — ATORVASTATIN CALCIUM 20 MG: 20 TABLET, FILM COATED ORAL at 20:39

## 2024-02-11 RX ADMIN — TIMOLOL MALEATE 1 DROP: 5 SOLUTION/ DROPS OPHTHALMIC at 20:40

## 2024-02-11 RX ADMIN — Medication 10 ML: at 08:02

## 2024-02-11 RX ADMIN — BRIMONIDINE TARTRATE 1 DROP: 2 SOLUTION OPHTHALMIC at 08:06

## 2024-02-11 RX ADMIN — FERROUS SULFATE TAB 325 MG (65 MG ELEMENTAL FE) 325 MG: 325 (65 FE) TAB at 08:04

## 2024-02-11 RX ADMIN — CARVEDILOL 12.5 MG: 12.5 TABLET, FILM COATED ORAL at 17:05

## 2024-02-11 RX ADMIN — TRAZODONE HYDROCHLORIDE 25 MG: 50 TABLET ORAL at 20:39

## 2024-02-11 RX ADMIN — VALPROIC ACID 375 MG: 250 SOLUTION ORAL at 20:41

## 2024-02-11 RX ADMIN — HEPARIN SODIUM 5000 UNITS: 5000 INJECTION INTRAVENOUS; SUBCUTANEOUS at 20:39

## 2024-02-11 RX ADMIN — TIMOLOL MALEATE 1 DROP: 5 SOLUTION/ DROPS OPHTHALMIC at 13:39

## 2024-02-11 RX ADMIN — THIAMINE HCL TAB 100 MG 100 MG: 100 TAB at 08:06

## 2024-02-11 RX ADMIN — HEPARIN SODIUM 5000 UNITS: 5000 INJECTION INTRAVENOUS; SUBCUTANEOUS at 08:03

## 2024-02-11 RX ADMIN — QUETIAPINE FUMARATE 25 MG: 25 TABLET ORAL at 20:40

## 2024-02-11 RX ADMIN — Medication 5 MG: at 20:39

## 2024-02-11 RX ADMIN — INSULIN LISPRO 4 UNITS: 100 INJECTION, SOLUTION INTRAVENOUS; SUBCUTANEOUS at 21:45

## 2024-02-11 RX ADMIN — BRIMONIDINE TARTRATE 1 DROP: 2 SOLUTION OPHTHALMIC at 20:40

## 2024-02-11 RX ADMIN — INSULIN LISPRO 4 UNITS: 100 INJECTION, SOLUTION INTRAVENOUS; SUBCUTANEOUS at 12:11

## 2024-02-11 RX ADMIN — TORSEMIDE 10 MG: 10 TABLET ORAL at 08:05

## 2024-02-11 RX ADMIN — QUETIAPINE FUMARATE 12.5 MG: 25 TABLET ORAL at 08:06

## 2024-02-11 RX ADMIN — SENNOSIDES AND DOCUSATE SODIUM 2 TABLET: 8.6; 5 TABLET ORAL at 08:04

## 2024-02-11 RX ADMIN — ARIPIPRAZOLE 5 MG: 5 TABLET ORAL at 08:04

## 2024-02-11 NOTE — PLAN OF CARE
Goal Outcome Evaluation:  Plan of Care Reviewed With: patient        Progress: improving  Outcome Evaluation: Pt rested well throughout the shift. Pt restraints dc/d at 2000. Pt has not required restraints this shift. Daughter at bedside. Pt having auditory hallucinations. Pt redirectable with clear instructions when care is performed as they are blind at baseline. VSS on RA. No other concerns noted at this time.

## 2024-02-11 NOTE — PROGRESS NOTES
[facility] Medicine Services  PROGRESS NOTE    Patient Name: Omkar Nava  : 1955  MRN: [id number]    Date of Admission: 2024  Primary Care Physician: Deann Cao MD    Subjective   Subjective     CC:  delirium    HPI:  Has remained out of restraints since yesterday afternoon.       Objective   Objective     Vital Signs:   Temp:  [97.4 °F (36.3 °C)-97.6 °F (36.4 °C)] 97.5 °F (36.4 °C)  Heart Rate:  [51-72] 56  Resp:  [16-18] 18  BP: (109-174)/(49-78) 109/65     Physical Exam:  Constitutional: No acute distress, awake, alert  Respiratory: Clear to auscultation bilaterally, respiratory effort normal   Cardiovascular: RRR, no murmurs, rubs, or gallops  Gastrointestinal: Positive bowel sounds, soft, nontender, nondistended  Musculoskeletal: No bilateral ankle edema  Psychiatric: drowsy but awakens with stimulation  Neurologic: no focal deficits      Results Reviewed:  LAB RESULTS:      Lab 24  0638 24  1144 24  1651 24  1146   WBC 4.33 6.30  --  7.84   HEMOGLOBIN 8.6* 8.1*  --  7.7*   HEMATOCRIT 25.8* 25.6*  --  25.0*   PLATELETS 271 214  --  243   NEUTROS ABS  --  4.50  --  5.19   IMMATURE GRANS (ABS)  --  0.02  --  0.02   LYMPHS ABS  --  1.03  --  1.74   MONOS ABS  --  0.52  --  0.79   EOS ABS  --  0.21  --  0.09   MCV 87.5 93.1  --  95.8   PROCALCITONIN  --   --   --  0.16   LACTATE  --   --  0.9 1.0         Lab 02/10/24  0351 24  0616 24  0638 24  1144 24  1146   SODIUM 138 138 140 141 142   POTASSIUM 4.5 4.3 4.5 4.9 5.4*   CHLORIDE 104 102 103 104 107   CO2 22.0 27.0 25.0 26.0 27.0   ANION GAP 12.0 9.0 12.0 11.0 8.0   BUN 24* 30* 25* 26* 30*   CREATININE 1.57* 1.68* 1.53* 1.43* 1.80*   EGFR 48.3* 44.5* 49.8* 54.0* 41.0*   GLUCOSE 114* 120* 89 185* 125*   CALCIUM 9.1 9.0 8.8 8.9 9.9   MAGNESIUM  --   --   --   --  2.1         Lab 24  0638 24  1146   TOTAL PROTEIN 6.4 7.0   ALBUMIN 3.4* 3.5   GLOBULIN 3.0  3.5   ALT (SGPT) 26 33   AST (SGOT) 22 21   BILIRUBIN 0.2 0.3   ALK PHOS 81 89         Lab 02/05/24  1651 02/05/24  1146   PROBNP 1,065.0* 1,446.0*   HSTROP T 155* 168*             Lab 02/05/24  1146   FOLATE 13.90   VITAMIN B 12 670         Brief Urine Lab Results  (Last result in the past 365 days)        Color   Clarity   Blood   Leuk Est   Nitrite   Protein   CREAT   Urine HCG        02/05/24 1146 Yellow   Clear   Negative   Negative   Negative   Negative                   Microbiology Results Abnormal       Procedure Component Value - Date/Time    Blood Culture - Blood, Arm, Right [[id number]]  (Normal) Collected: 02/05/24 1320    Lab Status: Final result Specimen: Blood from Arm, Right Updated: 02/10/24 1401     Blood Culture No growth at 5 days    Blood Culture - Blood, Arm, Left [[id number]]  (Normal) Collected: 02/05/24 1320    Lab Status: Final result Specimen: Blood from Arm, Left Updated: 02/10/24 1401     Blood Culture No growth at 5 days    S. Pneumo Ag Urine or CSF - Urine, Urine, Clean Catch [[id number]]  (Normal) Collected: 02/05/24 1718    Lab Status: Final result Specimen: Urine, Clean Catch Updated: 02/06/24 0101     Strep Pneumo Ag Negative    Legionella Antigen, Urine - Urine, Urine, Clean Catch [[id number]]  (Normal) Collected: 02/05/24 1718    Lab Status: Final result Specimen: Urine, Clean Catch Updated: 02/06/24 0101     LEGIONELLA ANTIGEN, URINE Negative    Respiratory Panel PCR w/COVID-19(SARS-CoV-2) GIANNI/SIENNA/ANNA/PAD/COR/ASHIA In-House, NP Swab in UTM/VTM, 2 HR TAT - Swab, Nasopharynx [[id number]]  (Normal) Collected: 02/05/24 1720    Lab Status: Final result Specimen: Swab from Nasopharynx Updated: 02/05/24 1810     ADENOVIRUS, PCR Not Detected     Coronavirus 229E Not Detected     Coronavirus HKU1 Not Detected     Coronavirus NL63 Not Detected     Coronavirus OC43 Not Detected     COVID19 Not Detected     Human Metapneumovirus Not Detected     Human Rhinovirus/Enterovirus Not Detected      Influenza A PCR Not Detected     Influenza B PCR Not Detected     Parainfluenza Virus 1 Not Detected     Parainfluenza Virus 2 Not Detected     Parainfluenza Virus 3 Not Detected     Parainfluenza Virus 4 Not Detected     RSV, PCR Not Detected     Bordetella pertussis pcr Not Detected     Bordetella parapertussis PCR Not Detected     Chlamydophila pneumoniae PCR Not Detected     Mycoplasma pneumo by PCR Not Detected    Narrative:      In the setting of a positive respiratory panel with a viral infection PLUS a negative procalcitonin without other underlying concern for bacterial infection, consider observing off antibiotics or discontinuation of antibiotics and continue supportive care. If the respiratory panel is positive for atypical bacterial infection (Bordetella pertussis, Chlamydophila pneumoniae, or Mycoplasma pneumoniae), consider antibiotic de-escalation to target atypical bacterial infection.    COVID PRE-OP / PRE-PROCEDURE SCREENING ORDER (NO ISOLATION) - Swab, Nasopharynx [[id number]]  (Normal) Collected: 02/05/24 1302    Lab Status: Final result Specimen: Swab from Nasopharynx Updated: 02/05/24 1430    Narrative:      The following orders were created for panel order COVID PRE-OP / PRE-PROCEDURE SCREENING ORDER (NO ISOLATION) - Swab, Nasopharynx.  Procedure                               Abnormality         Status                     ---------                               -----------         ------                     COVID-19 and FLU A/B PCR...[[id number]]  Normal              Final result                 Please view results for these tests on the individual orders.    COVID-19 and FLU A/B PCR, 1 HR TAT - Swab, Nasopharynx [[id number]]  (Normal) Collected: 02/05/24 1302    Lab Status: Final result Specimen: Swab from Nasopharynx Updated: 02/05/24 1430     COVID19 Not Detected     Influenza A PCR Not Detected     Influenza B PCR Not Detected    Narrative:      Fact sheet for providers:  https://www.fda.gov/media/268924/download    Fact sheet for patients: https://www.fda.gov/media/246021/download    Test performed by PCR.            No radiology results from the last 24 hrs    Results for orders placed during the hospital encounter of 01/15/24    Adult Transthoracic Echo Complete With Contrast if Necessary Per Protocol    Interpretation Summary    Left ventricular ejection fraction appears to be 56 - 60%.    Left ventricular wall thickness is consistent with hypertrophy.    Estimated right ventricular systolic pressure from tricuspid regurgitation is mildly elevated (35-45 mmHg).    There is a small (1-2cm) pericardial effusion.    No evidence for pericardial tamponade      Current medications:  Scheduled Meds:amLODIPine, 10 mg, Oral, Q24H  ARIPiprazole, 5 mg, Oral, Daily  atorvastatin, 20 mg, Oral, Nightly  brimonidine, 1 drop, Both Eyes, BID   And  timolol, 1 drop, Both Eyes, BID  carvedilol, 25 mg, Oral, BID With Meals  famotidine, 20 mg, Oral, Daily  ferrous sulfate, 325 mg, Oral, Daily With Breakfast  FLUoxetine, 20 mg, Oral, BID  heparin (porcine), 5,000 Units, Subcutaneous, Q12H  hydrALAZINE, 50 mg, Oral, TID  insulin lispro, 2-7 Units, Subcutaneous, 4x Daily AC & at Bedtime  melatonin, 5 mg, Oral, Nightly  QUEtiapine, 12.5 mg, Oral, Daily  QUEtiapine, 25 mg, Oral, Nightly  senna-docusate sodium, 2 tablet, Oral, BID  sodium chloride, 10 mL, Intravenous, Q12H  sterile water (preservative free), , ,   thiamine, 100 mg, Oral, Daily  torsemide, 10 mg, Oral, Daily  traZODone, 25 mg, Oral, Nightly  Valproic Acid, 375 mg, Oral, Q12H      Continuous Infusions:   PRN Meds:.  acetaminophen    senna-docusate sodium **AND** polyethylene glycol **AND** bisacodyl **AND** bisacodyl    dextrose    dextrose    glucagon (human recombinant)    hydrALAZINE    hydrOXYzine    ipratropium-albuterol    ondansetron    sodium chloride    sodium chloride    sodium chloride    sterile water (preservative  free)    Assessment & Plan   Assessment & Plan     Active Hospital Problems    Diagnosis  POA    **Encephalopathy [G93.40]  Yes    Bilateral pneumonia [J18.9]  Yes    Acute respiratory failure with hypoxia [J96.01]  Yes    Chronic kidney disease [N18.9]  Yes    Dementia [F03.90]  Yes    Anemia of chronic disease [D63.8]  Yes    Essential hypertension [I10]  Yes    Type 2 diabetes mellitus [E11.9]  Yes      Resolved Hospital Problems   No resolved problems to display.        Brief Hospital Course to date:  Omkar Nava is a 68 y.o. male with PMHx significant for HFrEF, DMII, chronic anemia, CKDIII, HTN, Hx of Osteomyelitis s/p transmetatarsal amputation, dementia, HTN, recent admission for PNA, heart failure exacerbation, and JAYY, who is current resident of [facility] who presented with AMS and hypoxia. CT chest was most consistent with volume overload. Patient was given IV diuresis then transitioned to PO. Patient had a tongue laceration on admission and there was concern for seizure.      AMS  H/o dementia with psychosis  -no prior hx of seizures, does take Depakote, however family states it is not for seizures  -EEG negative for seizures  -s/p Geodon x 1 2/7 for increased agitation,   -s/p load w/depakote 1.5g IV x 1 per Neurology. Continue Depakote DR 375mg twice daily for behavior  -Continue trazadone nightly, hydroxyzine prn  -Continue trial of Abilify daily  - Continue Seroquel BID  -Intermittently requiring restraints and did not require restraints overnight, hopefully can keep him out of restraints.   - Neurology following.     Visual Deficits  - daughter reports the patient is blind and needs to be verbally stimulated prior to physical interaction. Without a warning he may become aggiated.      Acute on Chronic Hypoxic Respiratory Failure  Possible Exacerbation HFpEF  Dysphagia  Patient was requiring 3L due to oxygen saturation of 78% on room air on admission. He had a recent admission for pneumonia  w/positive MRSA PCR, discharged on linezolid 1/21/24. CT chest most consistent with volume overload. Echo done on 1/16/25 with EF 56-60%. He received IV lasix with improvement in his respiratory status. SLP also evaluated and he underwent MBS that showed moderate oral and mild pharyngeal dysphagia, continue modified diet. Cards seen during admission per family request. The recommended diuresis and outpatient follow-up. Continue home torsemide.     JAYY on CKDIII, suspect cardiorenal syndrome  - baseline around 1.3, was 1.8 on admission, improved but still remains elevated 1.57 this morning  - avoid nephrotoxins  - AM BMP     Elevated Troponin, likely demand in setting of above  - EKG appears unchanged from prior, suspect due to above  - troponin trended down     HTN  - continue coreg, norvasc, hydralazine     DMII  - SSI coverage for now     Discussed with family at the bedside.      Expected Discharge Location and Transportation: [facility], EMS  Expected Discharge   Expected Discharge Date: 2/10/2024; Expected Discharge Time:      DVT prophylaxis:  Medical DVT prophylaxis orders are present.         AM-PAC 6 Clicks Score (PT): 13 (02/10/24 2000)    CODE STATUS:   Code Status and Medical Interventions:   Ordered at: 02/05/24 1326     Level Of Support Discussed With:    Health Care Surrogate     Code Status (Patient has no pulse and is not breathing):    CPR (Attempt to Resuscitate)     Medical Interventions (Patient has pulse or is breathing):    Full Support     I have prepared this progress note with copied portions of the prior day's progress note of my own authorship to preserve accuracy and maintain consistency of documentation. I have reviewed these portions and edited them for correctness. I verify that the above documentation accurately and truly represents the evaluation and management performed on today's date.       Milli Joyce MD  02/11/24

## 2024-02-11 NOTE — PROGRESS NOTES
"                  Clinical Nutrition   Nutrition Support Assessment  Reason for Visit: Follow-up protocol      Patient Name: Omkar Nava  YOB: 1955  MRN: [id number]  Date of Encounter: 02/10/24 21:15 EST  Admission date: 2/5/2024    Comments: Pt able to participate in exam 2/10 Does not meet criteria for malnutrition dx at this time.    Nutrition Assessment   Admission Diagnosis:  Encephalopathy [G93.40]      Problem List:    Encephalopathy    Type 2 diabetes mellitus    Essential hypertension    Anemia of chronic disease    Chronic kidney disease    Dementia    Bilateral pneumonia    Acute respiratory failure with hypoxia        PMH:   He  has a past medical history of Anemia, Dementia, Diabetes mellitus, Dysphagia, GERD (gastroesophageal reflux disease), History of alcohol abuse, History of cocaine use, History of marijuana use, Hypertension, Osteomyelitis, Poor historian, and Visual impairment.    PSH:  He  has a past surgical history that includes Eye surgery; Foot Amputation (Left, 10/18/2022); and Foot Amputation (Left, 12/5/2022).    Applicable Nutrition Concerns:   Skin:  Oral:  GI:    Applicable Interval History:   2/5 SLP rec pureed food nectar thick liquid  2/6 SLP rec pureed food thin liquid      Reported/Observed/Food/Nutrition Related History:     2/10  Pt allows now able to take some food. Consent to exam.     2/8  Pt slumbering at time of visit. RN able to give food prefs observed. Dislikes egg, corn. Pudding is favorite.         Anthropometrics     Flowsheet Rows      Flowsheet Row First Filed Value   Admission Height 152 cm (59.84\") Documented at 02/05/2024 1239   Admission Weight 85.1 kg (187 lb 9.8 oz) Documented at 02/05/2024 1239       Height: Height: 152 cm (59.84\")  Last Filed Weight: Weight: 85.1 kg (187 lb 9.8 oz) (02/05/24 1239)  Method: Weight Method: Estimated  BMI: BMI (Calculated): 36.8  BMI classification: Obese Class II: 35-39.9kg/m2    UBW:  wts per EMR estimates " or bed range 109 -183 accuracy uncertain  Weight change:  unk at this time     Nutrition Focused Physical Exam     Date:   2/10      Nutrition focused exam completed. Pt does not meet criteria for malnutrition dx at this time.  One mild wasting at dorsal hand, temple and patella. Moderate calf wasting.  < Wasting criterion    Current Nutrition Prescription   PO: Diet: Diabetic Diets, Cardiac Diets; Healthy Heart (2-3 Na+); Consistent Carbohydrate; No Mixed Consistencies, Feeding Assistance - Nursing, No Straw; Texture: Pureed (NDD 1); Fluid Consistency: Thin (IDDSI 0)  Oral Nutrition Supplement:  Boost Plus daily   Intake: 4 Days: 45% x 10 meals recorded However note 75% x most recent 3 meals.    Nutrition Diagnosis   Date:  2/8            Updated:  2/10  Problem Potential suboptimal intake   Etiology Dysphagia pureed diet   Signs/Symptoms 56% x 4 meals recorded   Status: improved today to 75% x last 3 meals      Goal:   General: Nutrition to support treatment  PO: Maintain intake as last 3 meals  EN/PN: N/A    Nutrition Intervention      Follow treatment progress, Care plan reviewed, Advise alternate selection        Monitoring/Evaluation:   Per protocol, I&O, PO intake, Supplement intake, Pertinent labs, Weight, Symptoms, Swallow function      Melissa Serrano RD  Time Spent: 25 min

## 2024-02-12 LAB
GLUCOSE BLDC GLUCOMTR-MCNC: 126 MG/DL (ref 70–130)
GLUCOSE BLDC GLUCOMTR-MCNC: 149 MG/DL (ref 70–130)
GLUCOSE BLDC GLUCOMTR-MCNC: 162 MG/DL (ref 70–130)
GLUCOSE BLDC GLUCOMTR-MCNC: 203 MG/DL (ref 70–130)

## 2024-02-12 PROCEDURE — 97116 GAIT TRAINING THERAPY: CPT

## 2024-02-12 PROCEDURE — 25010000002 HEPARIN (PORCINE) PER 1000 UNITS: Performed by: NURSE PRACTITIONER

## 2024-02-12 PROCEDURE — 92526 ORAL FUNCTION THERAPY: CPT

## 2024-02-12 PROCEDURE — 97530 THERAPEUTIC ACTIVITIES: CPT

## 2024-02-12 PROCEDURE — 63710000001 INSULIN LISPRO (HUMAN) PER 5 UNITS: Performed by: INTERNAL MEDICINE

## 2024-02-12 PROCEDURE — 82948 REAGENT STRIP/BLOOD GLUCOSE: CPT

## 2024-02-12 PROCEDURE — 99232 SBSQ HOSP IP/OBS MODERATE 35: CPT

## 2024-02-12 RX ADMIN — TORSEMIDE 10 MG: 10 TABLET ORAL at 08:31

## 2024-02-12 RX ADMIN — SENNOSIDES AND DOCUSATE SODIUM 2 TABLET: 8.6; 5 TABLET ORAL at 08:32

## 2024-02-12 RX ADMIN — VALPROIC ACID 375 MG: 250 SOLUTION ORAL at 21:23

## 2024-02-12 RX ADMIN — FLUOXETINE HYDROCHLORIDE 20 MG: 20 CAPSULE ORAL at 21:24

## 2024-02-12 RX ADMIN — HYDRALAZINE HYDROCHLORIDE 50 MG: 50 TABLET, FILM COATED ORAL at 08:31

## 2024-02-12 RX ADMIN — FLUOXETINE HYDROCHLORIDE 20 MG: 20 CAPSULE ORAL at 08:32

## 2024-02-12 RX ADMIN — Medication 10 ML: at 21:25

## 2024-02-12 RX ADMIN — CARVEDILOL 12.5 MG: 12.5 TABLET, FILM COATED ORAL at 17:47

## 2024-02-12 RX ADMIN — Medication 5 MG: at 21:24

## 2024-02-12 RX ADMIN — AMLODIPINE BESYLATE 10 MG: 10 TABLET ORAL at 08:32

## 2024-02-12 RX ADMIN — ARIPIPRAZOLE 5 MG: 5 TABLET ORAL at 08:32

## 2024-02-12 RX ADMIN — HYDRALAZINE HYDROCHLORIDE 50 MG: 50 TABLET, FILM COATED ORAL at 16:25

## 2024-02-12 RX ADMIN — VALPROIC ACID 375 MG: 250 SOLUTION ORAL at 08:46

## 2024-02-12 RX ADMIN — Medication 10 ML: at 08:33

## 2024-02-12 RX ADMIN — QUETIAPINE FUMARATE 12.5 MG: 25 TABLET ORAL at 08:31

## 2024-02-12 RX ADMIN — BRIMONIDINE TARTRATE 1 DROP: 2 SOLUTION OPHTHALMIC at 08:46

## 2024-02-12 RX ADMIN — TIMOLOL MALEATE 1 DROP: 5 SOLUTION/ DROPS OPHTHALMIC at 21:24

## 2024-02-12 RX ADMIN — CARVEDILOL 12.5 MG: 12.5 TABLET, FILM COATED ORAL at 08:31

## 2024-02-12 RX ADMIN — THIAMINE HCL TAB 100 MG 100 MG: 100 TAB at 08:32

## 2024-02-12 RX ADMIN — HYDRALAZINE HYDROCHLORIDE 50 MG: 50 TABLET, FILM COATED ORAL at 21:24

## 2024-02-12 RX ADMIN — QUETIAPINE FUMARATE 25 MG: 25 TABLET ORAL at 21:30

## 2024-02-12 RX ADMIN — HEPARIN SODIUM 5000 UNITS: 5000 INJECTION INTRAVENOUS; SUBCUTANEOUS at 21:24

## 2024-02-12 RX ADMIN — FAMOTIDINE 20 MG: 20 TABLET, FILM COATED ORAL at 08:32

## 2024-02-12 RX ADMIN — BRIMONIDINE TARTRATE 1 DROP: 2 SOLUTION OPHTHALMIC at 21:24

## 2024-02-12 RX ADMIN — INSULIN LISPRO 2 UNITS: 100 INJECTION, SOLUTION INTRAVENOUS; SUBCUTANEOUS at 17:47

## 2024-02-12 RX ADMIN — ATORVASTATIN CALCIUM 20 MG: 20 TABLET, FILM COATED ORAL at 21:24

## 2024-02-12 RX ADMIN — HEPARIN SODIUM 5000 UNITS: 5000 INJECTION INTRAVENOUS; SUBCUTANEOUS at 08:31

## 2024-02-12 RX ADMIN — FERROUS SULFATE TAB 325 MG (65 MG ELEMENTAL FE) 325 MG: 325 (65 FE) TAB at 08:31

## 2024-02-12 RX ADMIN — TRAZODONE HYDROCHLORIDE 25 MG: 50 TABLET ORAL at 21:24

## 2024-02-12 RX ADMIN — TIMOLOL MALEATE 1 DROP: 5 SOLUTION/ DROPS OPHTHALMIC at 08:46

## 2024-02-12 NOTE — CASE MANAGEMENT/SOCIAL WORK
Continued Stay Note  [facility]     Patient Name: Omkar Nava  MRN: [id number]  Today's Date: 2/12/2024    Admit Date: 2/5/2024    Plan: SNF   Discharge Plan       Row Name 02/12/24 9630       Plan    Plan Comments Pt scheduled for 11am ambulance tomorrow back to [facility] if remains appropriate for discharge tomorrow.      Row Name 02/12/24 9318       Plan    Plan Comments MS[facility]. Pt has been out of restraints for over 24 hours and kia at 8:00pm it will be 48 hours. Magaly reports that as long as pt remains out of restraints tonight, can return to [facility] tomorrow. MSW did send an EMS request for tomorrow to return to [facility] as long as remains out of retraints and appropriate. MSW called and updated pt's daughter who remains agreeable to this plan.                   Discharge Codes    No documentation.                 Expected Discharge Date and Time       Expected Discharge Date Expected Discharge Time    Feb 10, 2024               LUCINDA Maya

## 2024-02-12 NOTE — THERAPY TREATMENT NOTE
Patient Name: Omkar Nava  : 1955    MRN: [id number]                              Today's Date: 2024       Admit Date: 2024    Visit Dx:     ICD-10-CM ICD-9-CM   1. Encephalopathy  G93.40 348.30   2. Altered mental status, unspecified altered mental status type  R41.82 780.97   3. Elevated troponin  R79.89 790.6   4. Acute kidney injury  N17.9 584.9   5. 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, without long-term current use of insulin    Orthostatic hypotension    Acute osteomyelitis of left foot    Type 2 diabetes mellitus    Essential hypertension    Psychotic disorder    Neurocognitive disorder    S/P transmetatarsal amputation of foot, left    AMS (altered mental status)    Hypoxia    Abscess of left foot    Anemia of chronic disease    Aspiration pneumonia    Cellulitis of left toe    Cellulitis of lower extremity    Cervical spine pain    Chronic kidney disease    Closed head injury without loss of consciousness    Dementia    Dental cavities    Diarrhea of presumed infectious origin    Displaced fracture of proximal phalanx of left great toe, initial encounter for open fracture    Dysphagia    Erectile dysfunction    Fall    Gas gangrene    Generalized muscle weakness    Hallucinations    Hypertensive heart and chronic kidney disease 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 failure, with stage 1 through stage 4 chronic kidney disease, or unspecified chronic kidney disease    Insomnia due to medical condition    Iron deficiency anemia    Klebsiella pneumoniae (k. pneumoniae) as the cause of diseases classified elsewhere    Laceration without foreign body, left foot, subsequent encounter    Long term (current) use of insulin    Other acute osteomyelitis, left ankle and foot    Personal history of Methicillin resistant  Staphylococcus aureus infection    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    Protein calorie malnutrition    Simple chronic bronchitis    Tobacco use    Type 2 diabetes mellitus with diabetic neuropathy, unspecified    Wound infection, posttraumatic    Type 2 diabetes mellitus with diabetic chronic kidney disease    Encephalopathy    Bilateral pneumonia    Acute respiratory failure with hypoxia     Past Medical History:   Diagnosis Date    Anemia     Dementia     Diabetes mellitus     Dysphagia     GERD (gastroesophageal reflux disease)     History of alcohol abuse     History of cocaine use     History of marijuana use     Hypertension     Osteomyelitis     Poor historian     records obtained from nursing home records & his family    Visual impairment      Past Surgical History:   Procedure Laterality Date    AMPUTATION FOOT Left 10/18/2022    Procedure: PARTIAL FIRST RAY AMPUTATION LEFT;  Surgeon: Yeison Petty MD;  Location:  [facility] OR;  Service: Orthopedics;  Laterality: Left;    AMPUTATION FOOT Left 12/5/2022    Procedure: Transmetatarsal of Left Foot;  Surgeon: Yeison Petty MD;  Location:  [facility] OR;  Service: Orthopedics;  Laterality: Left;    EYE SURGERY        General Information       [facility] Name 02/12/24 1402          Physical Therapy Time and Intention    Document Type therapy note (daily note)  -     Mode of Treatment physical therapy  -       Row Name 02/12/24 1402          General Information    Patient Profile Reviewed yes  -     Existing Precautions/Restrictions fall;other (see comments)  L transmet amp x 5, visually impaired, baseline dementia w/ visual and behavioral disturbances  -     Barriers to Rehab medically complex;previous functional deficit;cognitive status  -       Row Name 02/12/24 1403          Cognition    Orientation Status (Cognition) oriented to;person;unable/difficult to assess;disoriented to;place;situation;time  -       Row Name 02/12/24 1406          Safety Issues, Functional  Mobility    Safety Issues Affecting Function (Mobility) awareness of need for assistance;insight into deficits/self-awareness;safety precaution awareness;safety precautions follow-through/compliance;sequencing abilities  -     Impairments Affecting Function (Mobility) balance;endurance/activity tolerance;pain;postural/trunk control;range of motion (ROM);strength;cognition  -     Comment, Safety Issues/Impairments (Mobility) Pt lethargic and required cues to keep eyes open throughout session  -               User Key  (r) = Recorded By, (t) = Taken By, (c) = Cosigned By      Initials Name Provider Type     Ami Smith, PT Physical Therapist                   Mobility       Row Name 02/12/24 1404          Bed Mobility    Bed Mobility supine-sit;sit-supine  -     Supine-Sit [place] (Bed Mobility) minimum assist (75% patient effort);1 person assist;verbal cues  [facility]     Sit-Supine [place] (Bed Mobility) maximum assist (25% patient effort);2 person assist;verbal cues  [facility]     Assistive Device (Bed Mobility) bed rails;head of bed elevated;draw sheet  -     Comment, (Bed Mobility) VCs for sequencing. Required Melody to upright trunk from sup>sit  -       Row Name 02/12/24 1405          Transfers    Comment, (Transfers) VCs for hand placement, sequencing, and initiation.  -       Row Name 02/12/24 1404          Sit-Stand Transfer    Sit-Stand [place] (Transfers) moderate assist (50% patient effort);2 person assist;verbal cues  [facility]     Assistive Device (Sit-Stand Transfers) walker, front-wheeled  -     Comment, (Sit-Stand Transfer) STS x1 from EOB. Required manual assist to grab walker w/ R UE. Pt stood at EOB for dep pericare prior to ambulation  -       Row Name 02/12/24 1404          Gait/Stairs (Locomotion)    [place] Level (Gait) moderate assist (50% patient effort);2 person assist;verbal cues  [facility]     Assistive Device (Gait) walker, front-wheeled  -     Distance in Feet (Gait)  10  -     Deviations/Abnormal Patterns (Gait) bilateral deviations;base of support, narrow;arianne decreased;stride length decreased;weight shifting decreased;festinating/shuffling;gait speed decreased  -     Bilateral Gait Deviations forward flexed posture;heel strike decreased  -     Right Sided Gait Deviations leans right  -     Comment, (Gait/Stairs) Pt took 4 steps forward + 4 steps backward + 2 side steps to HOB. Demo shuffling gait w/ decreased speed. Required constant cues to task and assist w/ managing walker. Strong posterior lean noted. Cues to keeps eyes open w/ forward gaze. Further distance limited by fatigue  -               User Key  (r) = Recorded By, (t) = Taken By, (c) = Cosigned By      Initials Name Provider Type    Ami Melara PT Physical Therapist                   Obj/Interventions       Row Name 02/12/24 1410          Balance    Balance Assessment sitting static balance;sitting dynamic balance;sit to stand dynamic balance;standing static balance;standing dynamic balance  -     Static Sitting Balance contact guard  -     Dynamic Sitting Balance minimal assist  -     Position, Sitting Balance unsupported;sitting edge of bed  -     Sit to Stand Dynamic Balance moderate assist;2-person assist;verbal cues  -     Static Standing Balance minimal assist;2-person assist;verbal cues  -     Dynamic Standing Balance moderate assist;2-person assist;verbal cues  -     Position/Device Used, Standing Balance supported;walker, front-wheeled  -     Balance Interventions sitting;standing;sit to stand;supported;static;dynamic  -     Comment, Balance Strong posterior lean noted  -               User Key  (r) = Recorded By, (t) = Taken By, (c) = Cosigned By      Initials Name Provider Type    Ami Melara PT Physical Therapist                   Goals/Plan    No documentation.                  Clinical Impression       Row Name 02/12/24 1412          Pain     Pretreatment Pain Rating 0/10 - no pain  -     Posttreatment Pain Rating 0/10 - no pain  -       Row Name 02/12/24 1412          Plan of Care Review    Plan of Care Reviewed With patient  -     Progress no change  -     Outcome Evaluation Pt continues to present below baseline function d/t cognitive function and deficits in strength, balance, and activity tolerance. Pt required modA x2 for STS and to ambulate 10 ft w/ FWW. Pt would continue to benefit from skilled IP PT. Recommend d/c back to [facility] when medically appropriate  -       Row Name 02/12/24 1412          Vital Signs    Pre Systolic BP Rehab --  RN cleared PT  -     Post Systolic BP Rehab 112  -LH     Post Treatment Diastolic BP 45  -     Posttreatment Heart Rate (beats/min) 54  -     O2 Delivery Pre Treatment room air  -     O2 Delivery Intra Treatment room air  -     O2 Delivery Post Treatment room air  -     Pre Patient Position Supine  -     Intra Patient Position Standing  -     Post Patient Position Supine  -[facility] Name 02/12/24 1412          Positioning and Restraints    Pre-Treatment Position in bed  -     Post Treatment Position bed  -     In Bed supine;call light within reach;encouraged to call for assist;exit alarm on;side rails up x3;notified nsg  seizure pads  -               User Key  (r) = Recorded By, (t) = Taken By, (c) = Cosigned By      Initials Name Provider Type     Ami Smith, PT Physical Therapist                   Outcome Measures       [facility] Name 02/12/24 1416          How much help from another person do you currently need...    Turning from your back to your side while in flat bed without using bedrails? 3  -LH     Moving from lying on back to sitting on the side of a flat bed without bedrails? 3  -LH     Moving to and from a bed to a chair (including a wheelchair)? 2  -LH     Standing up from a chair using your arms (e.g., wheelchair, bedside chair)? 2  -LH     Climbing 3-5 steps with  a railing? 2  -     To walk in hospital room? 2  -     AM-PAC 6 Clicks Score (PT) 14  -     Highest Level of Mobility Goal 4 --> Transfer to chair/commode  -       Row Name 02/12/24 1416          Functional Assessment    Outcome Measure Options AM-PAC 6 Clicks Basic Mobility (PT)  -               User Key  (r) = Recorded By, (t) = Taken By, (c) = Cosigned By      Initials Name Provider Type     Ami Smith, PT Physical Therapist                                 Physical Therapy Education       Title: PT OT SLP Therapies (In Progress)       Topic: Physical Therapy (In Progress)       Point: Mobility training (In Progress)       Learning Progress Summary             Patient Acceptance, E, NR by  at 2/12/2024 1416    Acceptance, E, NR by  at 2/8/2024 1503    Eager, E, VU,DU,NR by  at 2/6/2024 1527    Comment: Educated pt. safety/technique w/bed mobility, transfers, ambulation, PT POC   Family Eager, E, VU,DU,NR by  at 2/6/2024 1527    Comment: Educated pt. safety/technique w/bed mobility, transfers, ambulation, PT POC                         Point: Home exercise program (Done)       Learning Progress Summary             Patient Eager, E, VU,DU,NR by  at 2/6/2024 1527    Comment: Educated pt. safety/technique w/bed mobility, transfers, ambulation, PT POC   Family Eager, E, VU,DU,NR by  at 2/6/2024 1527    Comment: Educated pt. safety/technique w/bed mobility, transfers, ambulation, PT POC                         Point: Body mechanics (In Progress)       Learning Progress Summary             Patient Acceptance, E, NR by  at 2/12/2024 1416    Acceptance, E, NR by CK at 2/8/2024 1503    Eager, E, VU,DU,NR by  at 2/6/2024 1527    Comment: Educated pt. safety/technique w/bed mobility, transfers, ambulation, PT POC   Family Eager, E, VU,DU,NR by  at 2/6/2024 1527    Comment: Educated pt. safety/technique w/bed mobility, transfers, ambulation, PT POC                         Point: Precautions  (In Progress)       Learning Progress Summary             Patient Acceptance, E, NR by  at 2/12/2024 1416    Acceptance, E, NR by  at 2/8/2024 1503    Eager, E, VU,DU,NR by  at 2/6/2024 1527    Comment: Educated pt. safety/technique w/bed mobility, transfers, ambulation, PT POC   Family Eager, E, VU,DU,NR by  at 2/6/2024 1527    Comment: Educated pt. safety/technique w/bed mobility, transfers, ambulation, PT POC                                         User Key       Initials Effective Dates Name Provider Type Discipline     06/01/21 -  Karlee Garrett, PT Physical Therapist PT     02/06/24 -  Katherine Guillen, PT Physical Therapist PT     09/21/23 -  Ami Smith, PT Physical Therapist PT                  PT Recommendation and Plan     Plan of Care Reviewed With: patient  Progress: no change  Outcome Evaluation: Pt continues to present below baseline function d/t cognitive function and deficits in strength, balance, and activity tolerance. Pt required modA x2 for STS and to ambulate 10 ft w/ FWW. Pt would continue to benefit from skilled IP PT. Recommend d/c back to [facility]/LTC when medically appropriate     Time Calculation:         PT Charges       Row Name 02/12/24 1417             Time Calculation    Start Time 1321  -      PT Received On 02/12/24  -      PT Goal Re-Cert Due Date 02/16/24  -         Timed Charges    88257 - Gait Training Minutes  8  -      13317 - PT Therapeutic Activity Minutes 15  -         Total Minutes    Timed Charges Total Minutes 23  -       Total Minutes 23  -                User Key  (r) = Recorded By, (t) = Taken By, (c) = Cosigned By      Initials Name Provider Type     Ami Smith, PT Physical Therapist                  Therapy Charges for Today       Code Description Service Date Service Provider Modifiers Qty    78290760678 HC GAIT TRAINING EA 15 MIN 2/12/2024 Ami Smith, PT GP 1    66058432217 HC PT THERAPEUTIC ACT EA 15 MIN 2/12/2024 Luis  Ami, PT GP 1    37276931715 HC PT THER SUPP EA 15 MIN 2/12/2024 Ami Smith, PT GP 2            PT G-Codes  Outcome Measure Options: AM-PAC 6 Clicks Basic Mobility (PT)  AM-PAC 6 Clicks Score (PT): 14  AM-PAC 6 Clicks Score (OT): 11  PT Discharge Summary  Anticipated Discharge Disposition (PT): assisted living, extended care facility    Ami Smith, PT  2/12/2024

## 2024-02-12 NOTE — PLAN OF CARE
Goal Outcome Evaluation:                     Anticipated Discharge Disposition (SLP): skilled nursing facility             Treatment Assessment (SLP): oral dysphagia, suspected, pharyngeal dysphagia (02/12/24 1435)    Plan for Continued Treatment (SLP): continue treatment per plan of care (02/12/24 5465)

## 2024-02-12 NOTE — CASE MANAGEMENT/SOCIAL WORK
Continued Stay Note  [facility]     Patient Name: Omkar Nava  MRN: [id number]  Today's Date: 2/12/2024    Admit Date: 2/5/2024    Plan: SNF   Discharge Plan       Row Name 02/12/24 1253       Plan    Plan Comments MSW spoke with Magaly with [facility]. Pt has been out of restraints for over 24 hours and tongith at 8:00pm it will be 48 hours. Magaly reports that as long as pt remains out of restraints tonight, can return to [facility] tomorrow. MSW did send an EMS request for tomorrow to return to [facility] as long as remains out of retraints and appropriate. MSW called and updated pt's daughter who remains agreeable to this plan.                   Discharge Codes    No documentation.                 Expected Discharge Date and Time       Expected Discharge Date Expected Discharge Time    Feb 10, 2024               LUCINDA Maya

## 2024-02-12 NOTE — THERAPY TREATMENT NOTE
Acute Care - Speech Language Pathology   Swallow Treatment Note [facility]     Patient Name: Omkar Nava  : 1955  MRN: [id number]  Today's Date: 2024               Admit Date: 2024    Visit Dx:     ICD-10-CM ICD-9-CM   1. Encephalopathy  G93.40 348.30   2. Altered mental status, unspecified altered mental status type  R41.82 780.97   3. Elevated troponin  R79.89 790.6   4. Acute kidney injury  N17.9 584.9   5. 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, without long-term current use of insulin    Orthostatic hypotension    Acute osteomyelitis of left foot    Type 2 diabetes mellitus    Essential hypertension    Psychotic disorder    Neurocognitive disorder    S/P transmetatarsal amputation of foot, left    AMS (altered mental status)    Hypoxia    Abscess of left foot    Anemia of chronic disease    Aspiration pneumonia    Cellulitis of left toe    Cellulitis of lower extremity    Cervical spine pain    Chronic kidney disease    Closed head injury without loss of consciousness    Dementia    Dental cavities    Diarrhea of presumed infectious origin    Displaced fracture of proximal phalanx of left great toe, initial encounter for open fracture    Dysphagia    Erectile dysfunction    Fall    Gas gangrene    Generalized muscle weakness    Hallucinations    Hypertensive heart and chronic kidney disease 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 failure, with stage 1 through stage 4 chronic kidney disease, or unspecified chronic kidney disease    Insomnia due to medical condition    Iron deficiency anemia    Klebsiella pneumoniae (k. pneumoniae) as the cause of diseases classified elsewhere    Laceration without foreign body, left foot, subsequent encounter    Long term (current) use of insulin    Other acute osteomyelitis, left  ankle and foot    Personal history of Methicillin resistant Staphylococcus aureus infection    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    Protein calorie malnutrition    Simple chronic bronchitis    Tobacco use    Type 2 diabetes mellitus with diabetic neuropathy, unspecified    Wound infection, posttraumatic    Type 2 diabetes mellitus with diabetic chronic kidney disease    Encephalopathy    Bilateral pneumonia    Acute respiratory failure with hypoxia     Past Medical History:   Diagnosis Date    Anemia     Dementia     Diabetes mellitus     Dysphagia     GERD (gastroesophageal reflux disease)     History of alcohol abuse     History of cocaine use     History of marijuana use     Hypertension     Osteomyelitis     Poor historian     records obtained from nursing home records & his family    Visual impairment      Past Surgical History:   Procedure Laterality Date    AMPUTATION FOOT Left 10/18/2022    Procedure: PARTIAL FIRST RAY AMPUTATION LEFT;  Surgeon: Yeison Petty MD;  Location:  [facility];  Service: Orthopedics;  Laterality: Left;    AMPUTATION FOOT Left 12/5/2022    Procedure: Transmetatarsal of Left Foot;  Surgeon: Yeison Petty MD;  Location:  [facility];  Service: Orthopedics;  Laterality: Left;    EYE SURGERY         SLP Recommendation and Plan     SLP Diet Recommendation: puree, nectar thick liquids, no mixed consistencies (02/12/24 1435)  Recommended Precautions and Strategies: upright posture during/after eating, no straw, small bites of food and sips of liquid, general aspiration precautions, 1:1 supervision, assist with feeding (02/12/24 1435)  SLP Rec. for Method of Medication Administration: meds whole, meds crushed, with puree, as tolerated (02/12/24 1435)     Monitor for Signs of Aspiration: yes, notify SLP if any concerns (02/12/24 1435)  Recommended Diagnostics: reassess via clinical swallow evaluation (02/12/24 1435)     Anticipated Discharge Disposition (SLP): skilled nursing facility (02/12/24 1435)         Predicted Duration Therapy Intervention (Days): until discharge (02/12/24 1435)  Oral Care Recommendations: Oral Care BID/PRN, Suction toothbrush (02/12/24 1435)        Daily Summary of Progress (SLP): progress toward functional goals as expected (02/12/24 1435)               Treatment Assessment (SLP): oral dysphagia, suspected, pharyngeal dysphagia (02/12/24 1435)  Treatment Assessment Comments (SLP): Intially no overt s/s of aspiration w/ thin liquid trials via small/controlled sips @ slow pace. Following ~3 thin liquid trials, pt noted w/ intermittent cough. No overt s/s of aspiration w/ NTL via multiple small/controlled sips. Pt declined pureed trials. Pt observed accepting large sips via straw, u/a to reduce bolus size despite cue. Recommend downgrade to pureed diet w/ NTL, no mixed consistencies, no straw, small/single sips, assistance w/ feeding. SLP will f/u for re-eval 2/13 (02/12/24 1435)  Plan for Continued Treatment (SLP): continue treatment per plan of care (02/12/24 1435)                SWALLOW EVALUATION (last 72 hours)       SLP Adult Swallow Evaluation       Row Name 02/12/24 1435                   Rehab Evaluation    Document Type therapy note (daily note)  -        Subjective Information no complaints  -        Patient Observations cooperative;lethargic  -        Patient/Family/Caregiver Comments/Observations No family present  -        Patient Effort adequate  -        Symptoms Noted During/After Treatment none  -           Pain    Additional Documentation Pain Scale: FACES Pre/Post-Treatment (Group)  -           Pain Scale: FACES Pre/Post-Treatment    Pain: FACES Scale, Pretreatment 0-->no hurt  -        Posttreatment Pain Rating 0-->no hurt  -           SLP Treatment Clinical Impressions    Treatment Assessment (SLP) oral dysphagia;suspected;pharyngeal dysphagia  -        Treatment Assessment Comments (SLP) Intially no overt s/s of aspiration w/ thin liquid trials via  small/controlled sips @ slow pace. Following ~3 thin liquid trials, pt noted w/ intermittent cough. No overt s/s of aspiration w/ NTL via multiple small/controlled sips. Pt declined pureed trials. Pt observed accepting large sips via straw, u/a to reduce bolus size despite cue. Recommend downgrade to pureed diet w/ NTL, no mixed consistencies, no straw, small/single sips, assistance w/ feeding. SLP will f/u for re-eval 2/13  -        Daily Summary of Progress (SLP) progress toward functional goals as expected  -        Plan for Continued Treatment (SLP) continue treatment per plan of care  -        Care Plan Review care plan/treatment goals reviewed  -           Recommendations    Predicted Duration Therapy Intervention (Days) until discharge  -        SLP Diet Recommendation puree;nectar thick liquids;no mixed consistencies  -        Recommended Diagnostics reassess via clinical swallow evaluation  -        Recommended Precautions and Strategies upright posture during/after eating;no straw;small bites of food and sips of liquid;general aspiration precautions;1:1 supervision;assist with feeding  -        Oral Care Recommendations Oral Care BID/PRN;Suction toothbrush  -        SLP Rec. for Method of Medication Administration meds whole;meds crushed;with puree;as tolerated  -        Monitor for Signs of Aspiration yes;notify SLP if any concerns  -        Anticipated Discharge Disposition (SLP) skilled nursing facility  -                  User Key  (r) = Recorded By, (t) = Taken By, (c) = Cosigned By      Initials Name Effective Dates     Kathy Gonzlaes, MS [facility]-SLP 05/12/23 -                     EDUCATION  The patient has been educated in the following areas:   Dysphagia (Swallowing Impairment) Modified Diet Instruction.        SLP GOALS       Row Name 02/12/24 4045             (LTG) Patient will demonstrate functional swallow for    Diet Texture (Demonstrate functional swallow) pureed  textures  -      Liquid viscosity (Demonstrate functional swallow) thin liquids  -      [place] (Demonstrate functional swallow) with 1:1 assist/ supervision  -      Time Frame (Demonstrate functional swallow) by discharge  -      Progress/Outcomes (Demonstrate functional swallow) continuing progress toward goal  -      Comment (Demonstrate functional swallow) Intermittent cough w/ thin liquid trials  -         (STG) Patient will tolerate trials of    Consistencies Trialed (Tolerate trials) thin liquids;nectar/ mildly thick liquids  -      Desired Outcome (Tolerate trials) without signs/symptoms of aspiration;with adequate oral prep/transit/clearance;with use of compensatory strategies (see comments)  -      [place] (Tolerate trials) with 1:1 assist/ supervision  -      Time Frame (Tolerate trials) by discharge  -      Progress/Outcomes (Tolerate trials) goal revised this date  -      Comment (Tolerate trials) No overt s/s of aspiration w/ NTL trials, pt declined pureed trials  -         (STG) Patient will tolerate therapeutic trials of    Consistencies Trialed (Tolerate therapeutic trials) mechanical ground textures  -      Desired Outcome (Tolerate therapeutic trials) without signs/symptoms of aspiration;with adequate oral prep/transit/clearance  -      [place] (Tolerate therapeutic trials) with 1:1 assist/ supervision  -      Time Frame (Tolerate therapeutic trials) by discharge  -      Progress/Outcomes (Tolerate therapeutic trials) goal ongoing  -         (STG) Lingual Strengthening Goal 1 (SLP)    Activity (Lingual Strengthening Goal 1, SLP) increase lingual tone/sensation/control/coordination/movement  -      Increase Lingual Tone/Sensation/Control/Coordination/Movement lingual resistance exercises  -      [place]/Accuracy (Lingual Strengthening Goal 1, SLP) with moderate cues (50-74% accuracy)  -      Time Frame (Lingual Strengthening Goal 1, SLP)  short term goal (STG)  -MH      Progress/Outcomes (Lingual Strengthening Goal 1, SLP) progress slower than expected  -MH         (STG) Pharyngeal Strengthening Exercise Goal 1 (SLP)    Activity (Pharyngeal Strengthening Goal 1, SLP) increase timing  -MH      Increase Timing prepping - 3 second prep or suck swallow or 3-step swallow  -MH      [place]/Accuracy (Pharyngeal Strengthening Goal 1, SLP) with moderate cues (50-74% accuracy)  -MH      Progress/Outcomes (Pharyngeal Strengthening Goal 1, SLP) progress slower than expected  -MH      Comment (Pharyngeal Strengthening Goal 1, SLP) Pt u/a to complete despite max cues  -                User Key  (r) = Recorded By, (t) = Taken By, (c) = Cosigned By      Initials Name Provider Type    Kathy Grossman MS CCC-SLP Speech and Language Pathologist                       Time Calculation:    Time Calculation- SLP       Row Name 02/12/24 1501             Time Calculation- SLP    SLP Start Time 1435  -      SLP Received On 02/12/24  -         Untimed Charges    65997-XI Treatment Swallow Minutes 41  -MH         Total Minutes    Untimed Charges Total Minutes 41  -MH       Total Minutes 41  -MH                User Key  (r) = Recorded By, (t) = Taken By, (c) = Cosigned By      Initials Name Provider Type    Kathy Grossman MS CCC-SLP Speech and Language Pathologist                    Therapy Charges for Today       Code Description Service Date Service Provider Modifiers Qty    [id number]  ST TREATMENT SWALLOW 3 2/12/2024 Kathy Gonzales MS CCC-SLP GN 1                 MS JIM Cazares  2/12/2024

## 2024-02-12 NOTE — PROGRESS NOTES
[facility] Medicine Services  PROGRESS NOTE    Patient Name: Omkar Nava  : 1955  MRN: [id number]    Date of Admission: 2024  Primary Care Physician: Deann Cao MD    Subjective   Subjective     CC:  Encephalopathy      HPI:  Has remained out of restraints.  No events overnight.      Objective   Objective     Vital Signs:   Temp:  [97.5 °F (36.4 °C)-98.6 °F (37 °C)] 98.3 °F (36.8 °C)  Heart Rate:  [53-67] 55  Resp:  [18] 18  BP: (125-177)/(52-84) 162/72     Physical Exam:  Constitutional: No acute distress, awake, alert  Respiratory: Clear to auscultation bilaterally, respiratory effort normal   Cardiovascular: RRR  Gastrointestinal: Positive bowel sounds, soft, nontender, nondistended  Musculoskeletal: No bilateral ankle edema  Psychiatric: Appropriate affect, cooperative  Neurologic: Oriented x 3, no focal deficits        Results Reviewed:  LAB RESULTS:      Lab 24  0638 24  1144 24  1651 24  1146   WBC 4.33 6.30  --  7.84   HEMOGLOBIN 8.6* 8.1*  --  7.7*   HEMATOCRIT 25.8* 25.6*  --  25.0*   PLATELETS 271 214  --  243   NEUTROS ABS  --  4.50  --  5.19   IMMATURE GRANS (ABS)  --  0.02  --  0.02   LYMPHS ABS  --  1.03  --  1.74   MONOS ABS  --  0.52  --  0.79   EOS ABS  --  0.21  --  0.09   MCV 87.5 93.1  --  95.8   PROCALCITONIN  --   --   --  0.16   LACTATE  --   --  0.9 1.0         Lab 24  0903 02/10/24  0351 24  0616 24  0638 24  1144 24  1146   SODIUM 140 138 138 140 141 142   POTASSIUM 4.4 4.5 4.3 4.5 4.9 5.4*   CHLORIDE 104 104 102 103 104 107   CO2 26.0 22.0 27.0 25.0 26.0 27.0   ANION GAP 10.0 12.0 9.0 12.0 11.0 8.0   BUN 24* 24* 30* 25* 26* 30*   CREATININE 1.63* 1.57* 1.68* 1.53* 1.43* 1.80*   EGFR 46.2* 48.3* 44.5* 49.8* 54.0* 41.0*   GLUCOSE 142* 114* 120* 89 185* 125*   CALCIUM 9.2 9.1 9.0 8.8 8.9 9.9   MAGNESIUM  --   --   --   --   --  2.1         Lab 24  06 24  1146   TOTAL  PROTEIN 6.4 7.0   ALBUMIN 3.4* 3.5   GLOBULIN 3.0 3.5   ALT (SGPT) 26 33   AST (SGOT) 22 21   BILIRUBIN 0.2 0.3   ALK PHOS 81 89         Lab 02/05/24  1651 02/05/24  1146   PROBNP 1,065.0* 1,446.0*   HSTROP T 155* 168*             Lab 02/05/24  1146   FOLATE 13.90   VITAMIN B 12 670         Brief Urine Lab Results  (Last result in the past 365 days)        Color   Clarity   Blood   Leuk Est   Nitrite   Protein   CREAT   Urine HCG        02/05/24 1146 Yellow   Clear   Negative   Negative   Negative   Negative                   Microbiology Results Abnormal       Procedure Component Value - Date/Time    Blood Culture - Blood, Arm, Right [[id number]]  (Normal) Collected: 02/05/24 1320    Lab Status: Final result Specimen: Blood from Arm, Right Updated: 02/10/24 1401     Blood Culture No growth at 5 days    Blood Culture - Blood, Arm, Left [[id number]]  (Normal) Collected: 02/05/24 1320    Lab Status: Final result Specimen: Blood from Arm, Left Updated: 02/10/24 1401     Blood Culture No growth at 5 days    S. Pneumo Ag Urine or CSF - Urine, Urine, Clean Catch [[id number]]  (Normal) Collected: 02/05/24 1718    Lab Status: Final result Specimen: Urine, Clean Catch Updated: 02/06/24 0101     Strep Pneumo Ag Negative    Legionella Antigen, Urine - Urine, Urine, Clean Catch [[id number]]  (Normal) Collected: 02/05/24 1718    Lab Status: Final result Specimen: Urine, Clean Catch Updated: 02/06/24 0101     LEGIONELLA ANTIGEN, URINE Negative    Respiratory Panel PCR w/COVID-19(SARS-CoV-2) [facility] In-House, NP Swab in UTM/VTM, 2 HR TAT - Swab, Nasopharynx [[id number]]  (Normal) Collected: 02/05/24 1720    Lab Status: Final result Specimen: Swab from Nasopharynx Updated: 02/05/24 1810     ADENOVIRUS, PCR Not Detected     Coronavirus 229E Not Detected     Coronavirus HKU1 Not Detected     Coronavirus NL63 Not Detected     Coronavirus OC43 Not Detected     COVID19 Not Detected     Human Metapneumovirus Not Detected      Human Rhinovirus/Enterovirus Not Detected     Influenza A PCR Not Detected     Influenza B PCR Not Detected     Parainfluenza Virus 1 Not Detected     Parainfluenza Virus 2 Not Detected     Parainfluenza Virus 3 Not Detected     Parainfluenza Virus 4 Not Detected     RSV, PCR Not Detected     Bordetella pertussis pcr Not Detected     Bordetella parapertussis PCR Not Detected     Chlamydophila pneumoniae PCR Not Detected     Mycoplasma pneumo by PCR Not Detected    Narrative:      In the setting of a positive respiratory panel with a viral infection PLUS a negative procalcitonin without other underlying concern for bacterial infection, consider observing off antibiotics or discontinuation of antibiotics and continue supportive care. If the respiratory panel is positive for atypical bacterial infection (Bordetella pertussis, Chlamydophila pneumoniae, or Mycoplasma pneumoniae), consider antibiotic de-escalation to target atypical bacterial infection.    COVID PRE-OP / PRE-PROCEDURE SCREENING ORDER (NO ISOLATION) - Swab, Nasopharynx [[id number]]  (Normal) Collected: 02/05/24 1302    Lab Status: Final result Specimen: Swab from Nasopharynx Updated: 02/05/24 1430    Narrative:      The following orders were created for panel order COVID PRE-OP / PRE-PROCEDURE SCREENING ORDER (NO ISOLATION) - Swab, Nasopharynx.  Procedure                               Abnormality         Status                     ---------                               -----------         ------                     COVID-19 and FLU A/B PCR...[[id number]]  Normal              Final result                 Please view results for these tests on the individual orders.    COVID-19 and FLU A/B PCR, 1 HR TAT - Swab, Nasopharynx [[id number]]  (Normal) Collected: 02/05/24 1302    Lab Status: Final result Specimen: Swab from Nasopharynx Updated: 02/05/24 1430     COVID19 Not Detected     Influenza A PCR Not Detected     Influenza B PCR Not Detected    Narrative:       Fact sheet for providers: https://www.fda.gov/media/031880/download    Fact sheet for patients: https://www.fda.gov/media/643555/download    Test performed by PCR.            No radiology results from the last 24 hrs    Results for orders placed during the hospital encounter of 01/15/24    Adult Transthoracic Echo Complete With Contrast if Necessary Per Protocol    Interpretation Summary    Left ventricular ejection fraction appears to be 56 - 60%.    Left ventricular wall thickness is consistent with hypertrophy.    Estimated right ventricular systolic pressure from tricuspid regurgitation is mildly elevated (35-45 mmHg).    There is a small (1-2cm) pericardial effusion.    No evidence for pericardial tamponade      Current medications:  Scheduled Meds:[Held by provider] amLODIPine, 10 mg, Oral, Q24H  ARIPiprazole, 5 mg, Oral, Daily  atorvastatin, 20 mg, Oral, Nightly  brimonidine, 1 drop, Both Eyes, BID   And  timolol, 1 drop, Both Eyes, BID  carvedilol, 12.5 mg, Oral, BID With Meals  famotidine, 20 mg, Oral, Daily  ferrous sulfate, 325 mg, Oral, Daily With Breakfast  FLUoxetine, 20 mg, Oral, BID  heparin (porcine), 5,000 Units, Subcutaneous, Q12H  [Held by provider] hydrALAZINE, 50 mg, Oral, TID  insulin lispro, 2-7 Units, Subcutaneous, 4x Daily AC & at Bedtime  melatonin, 5 mg, Oral, Nightly  QUEtiapine, 12.5 mg, Oral, Daily  QUEtiapine, 25 mg, Oral, Nightly  senna-docusate sodium, 2 tablet, Oral, BID  sodium chloride, 10 mL, Intravenous, Q12H  sterile water (preservative free), , ,   thiamine, 100 mg, Oral, Daily  torsemide, 10 mg, Oral, Daily  traZODone, 25 mg, Oral, Nightly  Valproic Acid, 375 mg, Oral, Q12H      Continuous Infusions:   PRN Meds:.  acetaminophen    senna-docusate sodium **AND** polyethylene glycol **AND** bisacodyl **AND** bisacodyl    dextrose    dextrose    glucagon (human recombinant)    hydrALAZINE    ipratropium-albuterol    ondansetron    QUEtiapine    sodium chloride    sodium  chloride    sodium chloride    sterile water (preservative free)    Assessment & Plan   Assessment & Plan     Active Hospital Problems    Diagnosis  POA    **Encephalopathy [G93.40]  Yes    Bilateral pneumonia [J18.9]  Yes    Acute respiratory failure with hypoxia [J96.01]  Yes    Chronic kidney disease [N18.9]  Yes    Dementia [F03.90]  Yes    Anemia of chronic disease [D63.8]  Yes    Essential hypertension [I10]  Yes    Type 2 diabetes mellitus [E11.9]  Yes      Resolved Hospital Problems   No resolved problems to display.        Brief Hospital Course to date:  Omkar Nava is a 68 y.o. male with PMHx significant for HFrEF, DMII, chronic anemia, CKDIII, HTN, Hx of Osteomyelitis s/p transmetatarsal amputation, dementia, HTN, recent admission for PNA, heart failure exacerbation, and JAYY, who is current resident of [facility] who presented with AMS and hypoxia. CT chest was most consistent with volume overload. Patient was given IV diuresis then transitioned to PO. Patient had a tongue laceration on admission and there was concern for seizure.      AMS  H/o dementia with psychosis  -EEG negative for seizures  -s/p load w/depakote 1.5g IV x 1 per Neurology. Continue Depakote DR 375mg twice daily for behavior  -Continue trazadone nightly, hydroxyzine prn  -Continue trial of Abilify daily  -Continue Seroquel BID  - Neurology following.   -Has remained out of restraints     Visual Deficits  - daughter reports the patient is blind and needs to be verbally stimulated prior to physical interaction. Without a warning he may become aggiated.      Acute on Chronic Hypoxic Respiratory Failure  Possible Exacerbation HFpEF  Dysphagia  Patient was requiring 3L due to oxygen saturation of 78% on room air on admission. He had a recent admission for pneumonia w/positive MRSA PCR, discharged on linezolid 1/21/24. CT chest most consistent with volume overload. Echo done on 1/16/25 with EF 56-60%. He received IV lasix with  improvement in his respiratory status. SLP also evaluated and he underwent MBS that showed moderate oral and mild pharyngeal dysphagia, continue modified diet. Cards seen during admission per family request. The recommended diuresis and outpatient follow-up. Continue home torsemide.     JAYY on CKDIII, suspect cardiorenal syndrome  - baseline around 1.3, was 1.8 on admission  - Creatinine stable at 1.6, may be new baseline. Continue to monitor     Elevated Troponin, likely demand in setting of above  - EKG appears unchanged from prior, suspect due to above  - troponin trended down     HTN  - continue coreg, norvasc, hydralazine     DMII  - SSI coverage for now     Discussed with family at the bedside.      Expected Discharge Location and Transportation: [facility], EMS  Expected Discharge   Expected Discharge Date: 2/10/2024; Expected Discharge Time:      DVT prophylaxis:  Medical DVT prophylaxis orders are present.         AM-PAC 6 Clicks Score (PT): 13 (02/11/24 9135)    CODE STATUS:   Code Status and Medical Interventions:   Ordered at: 02/05/24 1326     Level Of Support Discussed With:    Health Care Surrogate     Code Status (Patient has no pulse and is not breathing):    CPR (Attempt to Resuscitate)     Medical Interventions (Patient has pulse or is breathing):    Full Support     I have prepared this progress note with copied portions of the prior day's progress note of my own authorship to preserve accuracy and maintain consistency of documentation. I have reviewed these portions and edited them for correctness. I verify that the above documentation accurately and truly represents the evaluation and management performed on today's date.       Milli Joyce MD  02/12/24

## 2024-02-12 NOTE — PLAN OF CARE
Goal Outcome Evaluation:  Plan of Care Reviewed With: patient        Progress: no change  Outcome Evaluation: Pt continues to present below baseline function d/t cognitive function and deficits in strength, balance, and activity tolerance. Pt required modA x2 for STS and to ambulate 10 ft w/ FWW. Pt would continue to benefit from skilled IP PT. Recommend d/c back to [facility] when medically appropriate      Anticipated Discharge Disposition (PT): assisted living, extended care facility

## 2024-02-12 NOTE — PLAN OF CARE
Goal Outcome Evaluation:  Plan of Care Reviewed With: patient, daughter        Progress: improving

## 2024-02-12 NOTE — PROGRESS NOTES
[facility] Neurology    Progress Note    Patient Name: Omkar Nava  : 1955  MRN: [id number]  Primary Care Physician:  Deann Cao MD  Date of admission: 2024    Subjective     Chief Complaint: Altered mental status    History of Present Illness   Patient was seen resting comfortably in bed, easily entering the room..  No acute events overnight.  Has been out of restraints for 24 hours.    Review of Systems   General: Negative for fever, nausea, or vomiting.   Neurological: Negative for headache, pain, or weakness.     Objective     Physical Exam  Vitals and nursing note reviewed.   Constitutional:       General: He is not in acute distress.     Appearance: He is not ill-appearing.   Eyes:      Extraocular Movements: Extraocular movements intact.      Pupils: Pupils are equal, round, and reactive to light.      Comments: No nystagmus or deviated gaze noted   Neurological:      Mental Status: He is alert. Mental status is at baseline.      Cranial Nerves: No facial asymmetry.      Sensory: No sensory deficit.      Motor: No weakness, tremor or seizure activity.      Deep Tendon Reflexes:      Reflex Scores:       Bicep reflexes are 1+ on the right side and 1+ on the left side.       Patellar reflexes are 1+ on the right side and 1+ on the left side.     Comments:     Cranial Nerves   CN II: Pupils are equal, round, and reactive to light. Normal visual acuity and visual fields.    CN III IV VI: Extraocular movements are full without nystagmus.  CN V: Normal facial sensation and strength of muscles of mastication.  CN VII: Facial movements are symmetric. No weakness.  CN VIII: [place]  CN XII: The tongue is midline.  No atrophy or fasciculations.            Vitals:   Temp:  [97.9 °F (36.6 °C)-98.6 °F (37 °C)] 98.3 °F (36.8 °C)  Heart Rate:  [53-67] 55  Resp:  [18] 18  BP: (130-177)/(52-84) 162/72    Current Medications    Current Facility-Administered Medications:     acetaminophen (TYLENOL)  tablet 650 mg, 650 mg, Oral, Q6H PRN, Sarah Gann, DO    amLODIPine (NORVASC) tablet 10 mg, 10 mg, Oral, Q24H, Milli Joyce MD, 10 mg at 02/10/24 0843    ARIPiprazole (ABILIFY) tablet 5 mg, 5 mg, Oral, Daily, Mila Crews, APRN, 5 mg at 02/11/24 0804    atorvastatin (LIPITOR) tablet 20 mg, 20 mg, Oral, Nightly, Sarah Gann DO, 20 mg at 02/11/24 2039    sennosides-docusate (PERICOLACE) 8.6-50 MG per tablet 2 tablet, 2 tablet, Oral, BID, 2 tablet at 02/11/24 0804 **AND** polyethylene glycol (MIRALAX) packet 17 g, 17 g, Oral, Daily PRN **AND** bisacodyl (DULCOLAX) EC tablet 5 mg, 5 mg, Oral, Daily PRN **AND** bisacodyl (DULCOLAX) suppository 10 mg, 10 mg, Rectal, Daily PRN, Sarah Gann, DO    brimonidine (ALPHAGAN) 0.2 % ophthalmic solution 1 drop, 1 drop, Both Eyes, BID, 1 drop at 02/11/24 2040 **AND** timolol (TIMOPTIC) 0.5 % ophthalmic solution 1 drop, 1 drop, Both Eyes, BID, Sarah Gann DO, 1 drop at 02/11/24 2040    carvedilol (COREG) tablet 12.5 mg, 12.5 mg, Oral, BID With Meals, Milli Joyce MD, 12.5 mg at 02/11/24 1705    dextrose (D50W) (25 g/50 mL) IV injection 25 g, 25 g, Intravenous, Q15 Min PRN, Sarah Gann,     dextrose (GLUTOSE) oral gel 15 g, 15 g, Oral, Q15 Min PRN, Sarah Gann, DO    famotidine (PEPCID) tablet 20 mg, 20 mg, Oral, Daily, Ijeoma Kuhn, PharmD, 20 mg at 02/11/24 0805    ferrous sulfate tablet 325 mg, 325 mg, Oral, Daily With Breakfast, Sarah Gann DO, 325 mg at 02/11/24 0804    FLUoxetine (PROzac) capsule 20 mg, 20 mg, Oral, BID, Sarah Gann DO, 20 mg at 02/11/24 2039    glucagon (GLUCAGEN) injection 1 mg, 1 mg, Intramuscular, Q15 Min PRN, Sarah Gann DO    heparin (porcine) 5000 UNIT/ML injection 5,000 Units, 5,000 Units, Subcutaneous, Q12H, Kari Teague, NARGIS, 5,000 Units at 02/11/24 2039    hydrALAZINE (APRESOLINE) injection 10 mg, 10 mg, Intravenous, Q6H PRN, Kari Teague, APRN     hydrALAZINE (APRESOLINE) tablet 50 mg, 50 mg, Oral, TID, Milli Joyce MD, 50 mg at 02/10/24 2114    Insulin Lispro (humaLOG) injection 2-7 Units, 2-7 Units, Subcutaneous, 4x Daily AC & at Bedtime, Sarah Gann, DO, 4 Units at 02/11/24 2145    ipratropium-albuterol (DUO-NEB) nebulizer solution 3 mL, 3 mL, Nebulization, Q4H PRN, Alexandra De Leon MD    melatonin tablet 5 mg, 5 mg, Oral, Nightly, Sarah Gann, DO, 5 mg at 02/11/24 2039    ondansetron (ZOFRAN) injection 4 mg, 4 mg, Intravenous, Q6H PRN, Sarah Gann, DO    QUEtiapine (SEROquel) tablet 12.5 mg, 12.5 mg, Oral, Daily, Kari Teague, APRN, 12.5 mg at 02/11/24 0806    QUEtiapine (SEROquel) tablet 25 mg, 25 mg, Oral, Nightly, Kari Teague E, APRN, 25 mg at 02/11/24 2040    QUEtiapine (SEROquel) tablet 25 mg, 25 mg, Oral, Q8H PRN, Milli Joyce MD    sodium chloride 0.9 % flush 10 mL, 10 mL, Intravenous, PRN, Sarah Gann R, DO    sodium chloride 0.9 % flush 10 mL, 10 mL, Intravenous, Q12H, Sarah Gann, DO, 10 mL at 02/11/24 2042    sodium chloride 0.9 % flush 10 mL, 10 mL, Intravenous, PRN, Sarah Gann, DO, 10 mL at 02/07/24 0856    sodium chloride 0.9 % infusion 40 mL, 40 mL, Intravenous, PRN, Sarah Gann, DO    sterile water (preservative free) injection  - ADS Override Pull, , , ,     thiamine (VITAMIN B-1) tablet 100 mg, 100 mg, Oral, Daily, Sarah Gann, DO, 100 mg at 02/11/24 0806    torsemide (DEMADEX) tablet 10 mg, 10 mg, Oral, Daily, Kari Teague, NARGIS, 10 mg at 02/11/24 0805    traZODone (DESYREL) tablet 25 mg, 25 mg, Oral, Nightly, Sarah Gann, , 25 mg at 02/11/24 2039    Valproic Acid (DEPAKENE) syrup 375 mg, 375 mg, Oral, Q12H, Mila Crews, NARGIS, 375 mg at 02/11/24 2041    Laboratory Results:   Lab Results   Component Value Date    GLUCOSE 142 (H) 02/11/2024    CALCIUM 9.2 02/11/2024     02/11/2024    K 4.4 02/11/2024    CO2 26.0 02/11/2024     02/11/2024     "BUN 24 (H) 02/11/2024    CREATININE 1.63 (H) 02/11/2024    EGFRIFAFRI >60 07/25/2022    EGFRIFNONA >60 07/25/2022    BCR 14.7 02/11/2024    ANIONGAP 10.0 02/11/2024     Lab Results   Component Value Date    WBC 4.33 02/08/2024    HGB 8.6 (L) 02/08/2024    HCT 25.8 (L) 02/08/2024    MCV 87.5 02/08/2024     02/08/2024     No results found for: \"CHOL\"  Lab Results   Component Value Date    HDL 44 06/25/2022     Lab Results   Component Value Date    .4 (H) 06/25/2022     Lab Results   Component Value Date    TRIG 58 06/25/2022     Lab Results   Component Value Date    HGBA1C 7.00 (H) 10/16/2022     Lab Results   Component Value Date    INR 1.08 10/18/2022    INR 1.02 10/14/2022    INR 1.17 (H) 09/29/2022    PROTIME 13.9 10/18/2022    PROTIME 13.3 10/14/2022    PROTIME 14.8 (H) 09/29/2022     Lab Results   Component Value Date    FOLATE 13.90 02/05/2024     Lab Results   Component Value Date    [id number] 670 02/05/2024             Assessment / Plan   Brief Patient Summary:  DANIELA Nava is a 68 y.o. male with a past medical history of HFrEF, T2DM, chronic edema, CKD stage III, HTN, osteomyelitis s/p transmetatarsal amputation, dementia, HTN who presented from his living facility [facility] with complaint of altered mental status.      EEG showed no focal features or epileptic activity.  Nonspecific mild diffuse cerebral dysfunction.     CT head with no acute intracranial abnormalities.  Did show diffuse cortical atrophy with chronic microvascular changes.     Plan:   Altered mental status  Dementia  Questionable history of seizure  Patient has not required restraints in over 24 hours.  Continue Depakote  mg twice daily, per family this is for behavior and not seizure disorder.   Continue Abilify 5 mg daily  Continue Seroquel twice daily, 12.5 mg / 25 mg  Continue thiamine 100 mg daily  Continue trazodone 25 mg nightly  Continue fall/delirium precautions  Continue seizure precautions  If patient " has witnessed seizure-like activity load with IV Depakote 1000 mg and increase maintenance dose to 500 mg twice daily.  IV Valium as needed for seizure-like activity  Neurochecks every shift  General neurology will continue to follow    I have discussed the above with the patient, bedside RN Dr. Joyce  Time spent with patient: 35 minutes in face-to-face evaluation and management of the patient.    Copied text in this note has been reviewed and is accurate as of 02/12/24.     NARGIS Sheridan

## 2024-02-13 VITALS
WEIGHT: 187.61 LBS | BODY MASS INDEX: 36.83 KG/M2 | RESPIRATION RATE: 17 BRPM | OXYGEN SATURATION: 97 % | DIASTOLIC BLOOD PRESSURE: 86 MMHG | TEMPERATURE: 97.4 F | SYSTOLIC BLOOD PRESSURE: 174 MMHG | HEIGHT: 60 IN | HEART RATE: 69 BPM

## 2024-02-13 PROCEDURE — 99232 SBSQ HOSP IP/OBS MODERATE 35: CPT

## 2024-02-13 PROCEDURE — 25010000002 HEPARIN (PORCINE) PER 1000 UNITS: Performed by: NURSE PRACTITIONER

## 2024-02-13 PROCEDURE — 92610 EVALUATE SWALLOWING FUNCTION: CPT

## 2024-02-13 RX ORDER — QUETIAPINE FUMARATE 25 MG/1
25 TABLET, FILM COATED ORAL NIGHTLY
Qty: 30 TABLET | Refills: 0 | Status: SHIPPED | OUTPATIENT
Start: 2024-02-13 | End: 2024-03-14

## 2024-02-13 RX ORDER — TRAZODONE HYDROCHLORIDE 50 MG/1
25 TABLET ORAL NIGHTLY
Qty: 15 TABLET | Refills: 0 | Status: SHIPPED | OUTPATIENT
Start: 2024-02-13 | End: 2024-03-14

## 2024-02-13 RX ORDER — CARVEDILOL 12.5 MG/1
12.5 TABLET ORAL 2 TIMES DAILY WITH MEALS
Qty: 60 TABLET | Refills: 0 | Status: SHIPPED | OUTPATIENT
Start: 2024-02-13 | End: 2024-03-14

## 2024-02-13 RX ORDER — HYDRALAZINE HYDROCHLORIDE 50 MG/1
50 TABLET, FILM COATED ORAL 3 TIMES DAILY
Qty: 90 TABLET | Refills: 0 | Status: SHIPPED | OUTPATIENT
Start: 2024-02-13 | End: 2024-02-13 | Stop reason: SDUPTHER

## 2024-02-13 RX ORDER — HYDRALAZINE HYDROCHLORIDE 50 MG/1
50 TABLET, FILM COATED ORAL 3 TIMES DAILY
Qty: 90 TABLET | Refills: 0 | Status: SHIPPED | OUTPATIENT
Start: 2024-02-13 | End: 2024-03-14

## 2024-02-13 RX ORDER — ARIPIPRAZOLE 5 MG/1
5 TABLET ORAL DAILY
Qty: 30 TABLET | Refills: 0 | Status: SHIPPED | OUTPATIENT
Start: 2024-02-13 | End: 2024-02-13 | Stop reason: SDUPTHER

## 2024-02-13 RX ORDER — CARVEDILOL 12.5 MG/1
12.5 TABLET ORAL 2 TIMES DAILY WITH MEALS
Qty: 60 TABLET | Refills: 0 | Status: SHIPPED | OUTPATIENT
Start: 2024-02-13 | End: 2024-02-13 | Stop reason: SDUPTHER

## 2024-02-13 RX ORDER — QUETIAPINE FUMARATE 25 MG/1
12.5 TABLET, FILM COATED ORAL DAILY
Qty: 15 TABLET | Refills: 0 | Status: SHIPPED | OUTPATIENT
Start: 2024-02-13 | End: 2024-02-13 | Stop reason: SDUPTHER

## 2024-02-13 RX ORDER — QUETIAPINE FUMARATE 25 MG/1
12.5 TABLET, FILM COATED ORAL DAILY
Qty: 15 TABLET | Refills: 0 | Status: SHIPPED | OUTPATIENT
Start: 2024-02-13 | End: 2024-03-14

## 2024-02-13 RX ORDER — QUETIAPINE FUMARATE 25 MG/1
25 TABLET, FILM COATED ORAL NIGHTLY
Qty: 30 TABLET | Refills: 0 | Status: SHIPPED | OUTPATIENT
Start: 2024-02-13 | End: 2024-02-13 | Stop reason: SDUPTHER

## 2024-02-13 RX ORDER — ARIPIPRAZOLE 5 MG/1
5 TABLET ORAL DAILY
Qty: 30 TABLET | Refills: 0 | Status: SHIPPED | OUTPATIENT
Start: 2024-02-13 | End: 2024-03-14

## 2024-02-13 RX ORDER — TRAZODONE HYDROCHLORIDE 50 MG/1
25 TABLET ORAL NIGHTLY
Qty: 15 TABLET | Refills: 0 | Status: SHIPPED | OUTPATIENT
Start: 2024-02-13 | End: 2024-02-13 | Stop reason: SDUPTHER

## 2024-02-13 RX ADMIN — THIAMINE HCL TAB 100 MG 100 MG: 100 TAB at 08:26

## 2024-02-13 RX ADMIN — FERROUS SULFATE TAB 325 MG (65 MG ELEMENTAL FE) 325 MG: 325 (65 FE) TAB at 08:26

## 2024-02-13 RX ADMIN — QUETIAPINE FUMARATE 12.5 MG: 25 TABLET ORAL at 08:27

## 2024-02-13 RX ADMIN — VALPROIC ACID 375 MG: 250 SOLUTION ORAL at 08:26

## 2024-02-13 RX ADMIN — ARIPIPRAZOLE 5 MG: 5 TABLET ORAL at 08:27

## 2024-02-13 RX ADMIN — AMLODIPINE BESYLATE 10 MG: 10 TABLET ORAL at 08:26

## 2024-02-13 RX ADMIN — TORSEMIDE 10 MG: 10 TABLET ORAL at 08:27

## 2024-02-13 RX ADMIN — CARVEDILOL 12.5 MG: 12.5 TABLET, FILM COATED ORAL at 08:26

## 2024-02-13 RX ADMIN — Medication 10 ML: at 08:28

## 2024-02-13 RX ADMIN — HEPARIN SODIUM 5000 UNITS: 5000 INJECTION INTRAVENOUS; SUBCUTANEOUS at 08:26

## 2024-02-13 RX ADMIN — SENNOSIDES AND DOCUSATE SODIUM 2 TABLET: 8.6; 5 TABLET ORAL at 08:27

## 2024-02-13 RX ADMIN — TIMOLOL MALEATE 1 DROP: 5 SOLUTION/ DROPS OPHTHALMIC at 08:27

## 2024-02-13 RX ADMIN — HYDRALAZINE HYDROCHLORIDE 50 MG: 50 TABLET, FILM COATED ORAL at 08:26

## 2024-02-13 RX ADMIN — BRIMONIDINE TARTRATE 1 DROP: 2 SOLUTION OPHTHALMIC at 08:27

## 2024-02-13 RX ADMIN — FAMOTIDINE 20 MG: 20 TABLET, FILM COATED ORAL at 08:27

## 2024-02-13 RX ADMIN — FLUOXETINE HYDROCHLORIDE 20 MG: 20 CAPSULE ORAL at 08:27

## 2024-02-13 NOTE — PROGRESS NOTES
[facility] Neurology    Progress Note    Patient Name: Omkar Nava  : 1955  MRN: [id number]  Primary Care Physician:  Deann Cao MD  Date of admission: 2024    Subjective     Chief Complaint: Dementia    History of Present Illness   Patient was seen resting comfortably in bed.  Nurse was assisting him with his breakfast.  He was calm and remained restraint free.  No acute events overnight.    Review of Systems   General: Negative for fever, nausea, or vomiting.   Neurological: Negative for headache, pain, or weakness.     Objective     Physical Exam  Vitals and nursing note reviewed.   Constitutional:       General: He is not in acute distress.  Eyes:      Extraocular Movements: Extraocular movements intact.      Pupils: Pupils are equal, round, and reactive to light.      Comments: No nystagmus noted   Cardiovascular:      Rate and Rhythm: Normal rate.   Pulmonary:      Effort: Pulmonary effort is normal.   Neurological:      Mental Status: He is alert. Mental status is at baseline.      Cranial Nerves: No facial asymmetry.      Sensory: No sensory deficit.      Motor: No weakness or seizure activity.      Comments:     Cranial Nerves   CN II: Pupils are equal, round, and reactive to light. Normal visual acuity and visual fields.    CN III IV VI: Extraocular movements are full without nystagmus.  CN V: Normal facial sensation and strength of muscles of mastication.  CN VII: Facial movements are symmetric. No weakness.  CN VIII:  Auditory acuity is normal.  CN IX & X:  Symmetric palatal movement.  CN XI: Sternocleidomastoid and trapezius are normal.  No weakness.  CN XII: The tongue is midline.  No atrophy or fasciculations.             Vitals:   Temp:  [97.2 °F (36.2 °C)-98.6 °F (37 °C)] 97.4 °F (36.3 °C)  Heart Rate:  [57-69] 69  Resp:  [17-18] 17  BP: (103-174)/(49-99) 174/86    Current Medications    Current Facility-Administered Medications:     acetaminophen (TYLENOL) tablet 650  mg, 650 mg, Oral, Q6H PRN, aSrah Gann, DO    amLODIPine (NORVASC) tablet 10 mg, 10 mg, Oral, Q24H, Milli Joyce MD, 10 mg at 02/13/24 0826    ARIPiprazole (ABILIFY) tablet 5 mg, 5 mg, Oral, Daily, Mila Crews, APRN, 5 mg at 02/13/24 0827    atorvastatin (LIPITOR) tablet 20 mg, 20 mg, Oral, Nightly, Sarah Gann DO, 20 mg at 02/12/24 2124    sennosides-docusate (PERICOLACE) 8.6-50 MG per tablet 2 tablet, 2 tablet, Oral, BID, 2 tablet at 02/13/24 0827 **AND** polyethylene glycol (MIRALAX) packet 17 g, 17 g, Oral, Daily PRN **AND** bisacodyl (DULCOLAX) EC tablet 5 mg, 5 mg, Oral, Daily PRN **AND** bisacodyl (DULCOLAX) suppository 10 mg, 10 mg, Rectal, Daily PRN, Sarah Gann, DO    brimonidine (ALPHAGAN) 0.2 % ophthalmic solution 1 drop, 1 drop, Both Eyes, BID, 1 drop at 02/13/24 0827 **AND** timolol (TIMOPTIC) 0.5 % ophthalmic solution 1 drop, 1 drop, Both Eyes, BID, Sarah Gann DO, 1 drop at 02/13/24 0827    carvedilol (COREG) tablet 12.5 mg, 12.5 mg, Oral, BID With Meals, Milli Joyce MD, 12.5 mg at 02/13/24 0826    dextrose (D50W) (25 g/50 mL) IV injection 25 g, 25 g, Intravenous, Q15 Min PRN, Sarah Gann DO    dextrose (GLUTOSE) oral gel 15 g, 15 g, Oral, Q15 Min PRN, Sarah Gann, DO    famotidine (PEPCID) tablet 20 mg, 20 mg, Oral, Daily, Ijeoma Kuhn, PharmD, 20 mg at 02/13/24 0827    ferrous sulfate tablet 325 mg, 325 mg, Oral, Daily With Breakfast, Sarah Gann DO, 325 mg at 02/13/24 0826    FLUoxetine (PROzac) capsule 20 mg, 20 mg, Oral, BID, Sarah Gann DO, 20 mg at 02/13/24 0827    glucagon (GLUCAGEN) injection 1 mg, 1 mg, Intramuscular, Q15 Min PRN, Sarah Gann DO    heparin (porcine) 5000 UNIT/ML injection 5,000 Units, 5,000 Units, Subcutaneous, Q12H, Kari Teague APRN, 5,000 Units at 02/13/24 0826    hydrALAZINE (APRESOLINE) injection 10 mg, 10 mg, Intravenous, Q6H PRN, Kari Teague APRN    hydrALAZINE  (APRESOLINE) tablet 50 mg, 50 mg, Oral, TID, Milli Joyce MD, 50 mg at 02/13/24 0826    Insulin Lispro (humaLOG) injection 2-7 Units, 2-7 Units, Subcutaneous, 4x Daily AC & at Bedtime, Sarah Gann, DO, 2 Units at 02/12/24 1747    ipratropium-albuterol (DUO-NEB) nebulizer solution 3 mL, 3 mL, Nebulization, Q4H PRN, Alexandra De Leon MD    melatonin tablet 5 mg, 5 mg, Oral, Nightly, Sarah Gann, DO, 5 mg at 02/12/24 2124    ondansetron (ZOFRAN) injection 4 mg, 4 mg, Intravenous, Q6H PRN, Sarah Gann,     QUEtiapine (SEROquel) tablet 12.5 mg, 12.5 mg, Oral, Daily, Kari Teague, APRN, 12.5 mg at 02/13/24 0827    QUEtiapine (SEROquel) tablet 25 mg, 25 mg, Oral, Nightly, Kari Teague, APRN, 25 mg at 02/12/24 2130    QUEtiapine (SEROquel) tablet 25 mg, 25 mg, Oral, Q8H PRN, Milli Joyce MD    sodium chloride 0.9 % flush 10 mL, 10 mL, Intravenous, PRN, Sarah Gann R, DO    sodium chloride 0.9 % flush 10 mL, 10 mL, Intravenous, Q12H, Sarah Gann, DO, 10 mL at 02/13/24 0828    sodium chloride 0.9 % flush 10 mL, 10 mL, Intravenous, PRN, Sarah Gann, DO, 10 mL at 02/07/24 0856    sodium chloride 0.9 % infusion 40 mL, 40 mL, Intravenous, PRN, Sarah Gann, DO    sterile water (preservative free) injection  - ADS Override Pull, , , ,     thiamine (VITAMIN B-1) tablet 100 mg, 100 mg, Oral, Daily, Sarah Gann DO, 100 mg at 02/13/24 0826    torsemide (DEMADEX) tablet 10 mg, 10 mg, Oral, Daily, Kari Teague APRN, 10 mg at 02/13/24 0827    traZODone (DESYREL) tablet 25 mg, 25 mg, Oral, Nightly, Sarah Gann DO, 25 mg at 02/12/24 2124    Valproic Acid (DEPAKENE) syrup 375 mg, 375 mg, Oral, Q12H, Mila Crews APRN, 375 mg at 02/13/24 0826    Laboratory Results:   Lab Results   Component Value Date    GLUCOSE 142 (H) 02/11/2024    CALCIUM 9.2 02/11/2024     02/11/2024    K 4.4 02/11/2024    CO2 26.0 02/11/2024     02/11/2024    BUN 24 (H)  "02/11/2024    CREATININE 1.63 (H) 02/11/2024    EGFRIFAFRI >60 07/25/2022    EGFRIFNONA >60 07/25/2022    BCR 14.7 02/11/2024    ANIONGAP 10.0 02/11/2024     Lab Results   Component Value Date    WBC 4.33 02/08/2024    HGB 8.6 (L) 02/08/2024    HCT 25.8 (L) 02/08/2024    MCV 87.5 02/08/2024     02/08/2024     No results found for: \"CHOL\"  Lab Results   Component Value Date    HDL 44 06/25/2022     Lab Results   Component Value Date    .4 (H) 06/25/2022     Lab Results   Component Value Date    TRIG 58 06/25/2022     Lab Results   Component Value Date    HGBA1C 7.00 (H) 10/16/2022     Lab Results   Component Value Date    INR 1.08 10/18/2022    INR 1.02 10/14/2022    INR 1.17 (H) 09/29/2022    PROTIME 13.9 10/18/2022    PROTIME 13.3 10/14/2022    PROTIME 14.8 (H) 09/29/2022     Lab Results   Component Value Date    FOLATE 13.90 02/05/2024     Lab Results   Component Value Date    [id number] 670 02/05/2024             Assessment / Plan   Brief Patient Summary:  Omkar Nava is a 68 y.o. male with a past medical history of HFrEF, T2DM, chronic edema, CKD stage III, HTN, osteomyelitis s/p transmetatarsal amputation, dementia, HTN who presented from his living facility [facility] with complaint of altered mental status.      EEG showed no focal features or epileptic activity.  Nonspecific mild diffuse cerebral dysfunction.     CT head with no acute intracranial abnormalities.  Did show diffuse cortical atrophy with chronic microvascular changes.     Plan:   Altered mental status  Dementia  Possible history of seizure  Patient has not required restraints in over 48 hours.  Continue Depakote  mg twice daily, per family this is for behavior and not seizure disorder.   Continue Abilify 5 mg daily  Continue Seroquel twice daily, 12.5 mg / 25 mg  Continue thiamine 100 mg daily  Continue trazodone 25 mg nightly  Continue fall/delirium precautions  Continue seizure precautions  If patient has witnessed " seizure-like activity load with IV Depakote 1000 mg and increase maintenance dose to 500 mg twice daily.  IV Valium as needed for seizure-like activity  Neurochecks every shift  Patient to follow-up with NARGIS Rudolph in approximately 3 months.  Patient is okay for discharge from a neurologic standpoint.  Please feel free to reach out any questions.    I have discussed the above with the patient, bedside RN and Dr. Dos Santos  Time spent with patient: 35 minutes in face-to-face evaluation and management of the patient.    Copied text in this note has been reviewed and is accurate as of 02/13/24.     NARGIS Sheridan

## 2024-02-13 NOTE — THERAPY RE-EVALUATION
Acute Care - Speech Language Pathology   Swallow Re-Evaluation [facility]  Clinical Swallow Evaluation       Patient Name: Omkar Nava  : 1955  MRN: [id number]  Today's Date: 2024               Admit Date: 2024    Visit Dx:     ICD-10-CM ICD-9-CM   1. Encephalopathy  G93.40 348.30   2. Altered mental status, unspecified altered mental status type  R41.82 780.97   3. Elevated troponin  R79.89 790.6   4. Acute kidney injury  N17.9 584.9   5. 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, without long-term current use of insulin    Orthostatic hypotension    Acute osteomyelitis of left foot    Type 2 diabetes mellitus    Essential hypertension    Psychotic disorder    Neurocognitive disorder    S/P transmetatarsal amputation of foot, left    AMS (altered mental status)    Hypoxia    Abscess of left foot    Anemia of chronic disease    Aspiration pneumonia    Cellulitis of left toe    Cellulitis of lower extremity    Cervical spine pain    Chronic kidney disease    Closed head injury without loss of consciousness    Dementia    Dental cavities    Diarrhea of presumed infectious origin    Displaced fracture of proximal phalanx of left great toe, initial encounter for open fracture    Dysphagia    Erectile dysfunction    Fall    Gas gangrene    Generalized muscle weakness    Hallucinations    Hypertensive heart and chronic kidney disease 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 failure, with stage 1 through stage 4 chronic kidney disease, or unspecified chronic kidney disease    Insomnia due to medical condition    Iron deficiency anemia    Klebsiella pneumoniae (k. pneumoniae) as the cause of diseases classified elsewhere    Laceration without foreign body, left foot, subsequent encounter    Long term (current) use of insulin    Other  acute osteomyelitis, left ankle and foot    Personal history of Methicillin resistant Staphylococcus aureus infection    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    Protein calorie malnutrition    Simple chronic bronchitis    Tobacco use    Type 2 diabetes mellitus with diabetic neuropathy, unspecified    Wound infection, posttraumatic    Type 2 diabetes mellitus with diabetic chronic kidney disease    Encephalopathy    Bilateral pneumonia    Acute respiratory failure with hypoxia     Past Medical History:   Diagnosis Date    Anemia     Dementia     Diabetes mellitus     Dysphagia     GERD (gastroesophageal reflux disease)     History of alcohol abuse     History of cocaine use     History of marijuana use     Hypertension     Osteomyelitis     Poor historian     records obtained from nursing home records & his family    Visual impairment      Past Surgical History:   Procedure Laterality Date    AMPUTATION FOOT Left 10/18/2022    Procedure: PARTIAL FIRST RAY AMPUTATION LEFT;  Surgeon: Yeison Petty MD;  Location:  [facility];  Service: Orthopedics;  Laterality: Left;    AMPUTATION FOOT Left 12/5/2022    Procedure: Transmetatarsal of Left Foot;  Surgeon: Yeison Petty MD;  Location:  [facility];  Service: Orthopedics;  Laterality: Left;    EYE SURGERY         SLP Recommendation and Plan  SLP Swallowing Diagnosis: oral dysphagia, suspected pharyngeal dysphagia (02/13/24 0910)  SLP Diet Recommendation: puree, nectar thick liquids, no mixed consistencies (02/13/24 0910)  Recommended Precautions and Strategies: upright posture during/after eating, no straw, small bites of food and sips of liquid, general aspiration precautions, 1:1 supervision, assist with feeding (slow pace) (02/13/24 0910)  SLP Rec. for Method of Medication Administration: meds whole, meds crushed, with puree, as tolerated (02/13/24 0910)     Monitor for Signs of Aspiration: yes, notify SLP if any concerns (02/13/24 0910)  Recommended Diagnostics: reassess via  clinical swallow evaluation, other (see comments) (vs OP MBS) (02/13/24 0910)  Swallow Criteria for Skilled Therapeutic Interventions Met: demonstrates skilled criteria (02/13/24 0910)  Anticipated Discharge Disposition (SLP): skilled nursing facility (02/13/24 0910)  Rehab Potential/Prognosis, Swallowing: adequate, monitor progress closely (02/13/24 0910)     Predicted Duration Therapy Intervention (Days): until discharge (02/13/24 0910)  Oral Care Recommendations: Oral Care BID/PRN, Suction toothbrush (02/13/24 0910)  Demonstrates Need for Referral to Another Service: speech therapy, other (see comments) (OP SLP services/OP MBS) (02/13/24 0910)     Daily Summary of Progress (SLP): progress toward functional goals as expected (02/12/24 1435)               Treatment Assessment (SLP): oral dysphagia, suspected, pharyngeal dysphagia (02/12/24 1435)  Treatment Assessment Comments (SLP): Intially no overt s/s of aspiration w/ thin liquid trials via small/controlled sips @ slow pace. Following ~3 thin liquid trials, pt noted w/ intermittent cough. No overt s/s of aspiration w/ NTL via multiple small/controlled sips. Pt declined pureed trials. Pt observed accepting large sips via straw, u/a to reduce bolus size despite cue. Recommend downgrade to pureed diet w/ NTL, no mixed consistencies, no straw, small/single sips, assistance w/ feeding. SLP will f/u for re-eval 2/13 (02/12/24 1435)  Plan for Continued Treatment (SLP): continue treatment per plan of care (02/12/24 1435)                SWALLOW EVALUATION (last 72 hours)       SLP Adult Swallow Evaluation       Row Name 02/13/24 0910 02/12/24 1435                Rehab Evaluation    Document Type re-evaluation  - therapy note (daily note)  -       Subjective Information no complaints  - no complaints  -       Patient Observations alert;cooperative  - cooperative;lethargic  -       Patient/Family/Caregiver Comments/Observations No family present  - No family  present  -       Patient Effort good  - adequate  -       Symptoms Noted During/After Treatment none  - none  -          General Information    Patient Profile Reviewed yes  - --       Pertinent History Of Current Problem [place] [facility] [place]/ recs for pureed diet w/ thin liquids, no straw & small/single sips vs downgrade to NTL if concerns w/ thins  - --       Current Method of Nutrition pureed;nectar/syrup-thick liquids;no mixed consistencies  - --       Precautions/Limitations, Vision other (see comments)  Baseline blindness per chart  - --       Precautions/Limitations, Hearing WFL;for purposes of eval  - --       Prior Level of Function-Communication other (see comments)  dementia per chart  - --       Prior Level of Function-Swallowing unknown  - --       Plans/Goals Discussed with patient  - --       Barriers to Rehab medically complex;cognitive status  - --       Patient's Goals for Discharge patient did not state  - --          Pain    Additional Documentation Pain Scale: FACES Pre/Post-Treatment (Group)  - Pain Scale: FACES Pre/Post-Treatment (Group)  -          Pain Scale: FACES Pre/Post-Treatment    Pain: FACES Scale, Pretreatment 0-->no hurt  - 0-->no hurt  -       Posttreatment Pain Rating 0-->no hurt  - 0-->no hurt  -          Oral Motor Structure and Function    Dentition Assessment missing teeth  - --       Secretion Management WNL/WFL  - --          Oral Musculature and Cranial Nerve Assessment    Oral Motor General Assessment generalized oral motor weakness  - --          General Eating/Swallowing Observations    Respiratory Support Currently in Use room air  - --       Eating/Swallowing Skills fed by SLP  - --       Positioning During Eating upright 90 degree;upright in bed  - --       Utensils Used spoon;cup;straw  - --       Consistencies Trialed ice chips;thin liquids;nectar/syrup-thick liquids;pureed  - --           Clinical Swallow Eval    Oral Prep Phase impaired  - --       Oral Transit impaired  - --       Pharyngeal Phase suspected pharyngeal impairment  - --       Clinical Swallow Evaluation Summary No overt s/s of aspiration accross trials small/controlled sips of NTL or pureed trials. Mutliple swallows & throat clear w/ thin liquid trials. Pt w/ seemingly better oral control w/ NTL vs thins. Solid trials deferred 2' severity of oral deficits/cog status. D/c planned this AM, however recommend OP MBS if appropriate. Ok to cont pureed diet w/ NTL, no mixed consistencies, no straw, assistance w/ feeding, small/single sips, slow pace  - --          Oral Prep Concerns    Oral Prep Concerns anterior loss;incomplete or weak lip closure around spoon;reduced lip opening  - --       Reduced Lip Opening all consistencies  - --       Incomplete or Weak Lip Closure Around Spoon all consistencies  - --       Anterior Loss thin  - --          Oral Transit Concerns    Oral Transit Concerns delayed initiation of bolus transit  - --       Delayed Intiation of Bolus Transit thin;nectar  - --          Pharyngeal Phase Concerns    Pharyngeal Phase Concerns throat clear;multiple swallows  - --       Multiple Swallows thin  - --       Throat Clear thin  - --          SLP Evaluation Clinical Impression    SLP Swallowing Diagnosis oral dysphagia;suspected pharyngeal dysphagia  - --       Functional Impact risk of aspiration/pneumonia;risk of malnutrition;risk of dehydration  - --       Rehab Potential/Prognosis, Swallowing adequate, monitor progress closely  - --       Swallow Criteria for Skilled Therapeutic Interventions Met demonstrates skilled criteria  - --          SLP Treatment Clinical Impressions    Treatment Assessment (SLP) -- oral dysphagia;suspected;pharyngeal dysphagia  -       Treatment Assessment Comments (SLP) -- Intially no overt s/s of aspiration w/ thin liquid trials via small/controlled  sips @ slow pace. Following ~3 thin liquid trials, pt noted w/ intermittent cough. No overt s/s of aspiration w/ NTL via multiple small/controlled sips. Pt declined pureed trials. Pt observed accepting large sips via straw, u/a to reduce bolus size despite cue. Recommend downgrade to pureed diet w/ NTL, no mixed consistencies, no straw, small/single sips, assistance w/ feeding. SLP will f/u for re-eval 2/13  -       Daily Summary of Progress (SLP) -- progress toward functional goals as expected  -       Plan for Continued Treatment (SLP) -- continue treatment per plan of care  -       Care Plan Review -- care plan/treatment goals reviewed  -          Recommendations    Predicted Duration Therapy Intervention (Days) until discharge  - until discharge  -       SLP Diet Recommendation puree;nectar thick liquids;no mixed consistencies  - puree;nectar thick liquids;no mixed consistencies  -       Recommended Diagnostics reassess via clinical swallow evaluation;other (see comments)  vs OP MBS  - reassess via clinical swallow evaluation  -       Recommended Precautions and Strategies upright posture during/after eating;no straw;small bites of food and sips of liquid;general aspiration precautions;1:1 supervision;assist with feeding  slow pace  - upright posture during/after eating;no straw;small bites of food and sips of liquid;general aspiration precautions;1:1 supervision;assist with feeding  -       Oral Care Recommendations Oral Care BID/PRN;Suction toothbrush  - Oral Care BID/PRN;Suction toothbrush  -       SLP Rec. for Method of Medication Administration meds whole;meds crushed;with puree;as tolerated  - meds whole;meds crushed;with puree;as tolerated  -       Monitor for Signs of Aspiration yes;notify SLP if any concerns  - yes;notify SLP if any concerns  -       Anticipated Discharge Disposition (SLP) skilled nursing facility  - skilled nursing facility  -       Demonstrates  Need for Referral to Another Service speech therapy;other (see comments)  OP SLP services/OP [facility] [place]  - --                 User Key  (r) = Recorded By, (t) = Taken By, (c) = Cosigned By      Initials Name Effective Dates    Kathy Grossman, MS CCC-SLP 05/12/23 -                     EDUCATION  The patient has been educated in the following areas:   Dysphagia (Swallowing Impairment) Modified Diet Instruction.        SLP GOALS       Row Name 02/13/24 0910 02/12/24 8143          (LTG) Patient will demonstrate functional swallow for    Diet Texture (Demonstrate functional swallow) pureed textures  - pureed textures  -     Liquid viscosity (Demonstrate functional swallow) thin liquids  - thin liquids  -     [place] (Demonstrate functional swallow) with 1:1 assist/ supervision  - with 1:1 assist/ supervision  -     Time Frame (Demonstrate functional swallow) by discharge  - by discharge  -     Progress/Outcomes (Demonstrate functional swallow) goal ongoing  - continuing progress toward goal  -     Comment (Demonstrate functional swallow) -- Intermittent cough w/ thin liquid trials  -        ([facility]) Patient will tolerate trials of    Consistencies Trialed (Tolerate trials) thin liquids;nectar/ mildly thick liquids  - thin liquids;nectar/ mildly thick liquids  -     Desired Outcome (Tolerate trials) without signs/symptoms of aspiration;with adequate oral prep/transit/clearance;with use of compensatory strategies (see comments)  - without signs/symptoms of aspiration;with adequate oral prep/transit/clearance;with use of compensatory strategies (see comments)  -     [place] (Tolerate trials) with 1:1 assist/ supervision  - with 1:1 assist/ supervision  -     Time Frame (Tolerate trials) by discharge  - by discharge  -     Progress/Outcomes (Tolerate trials) goal ongoing  - goal revised this date  -     Comment (Tolerate trials) -- No overt s/s of aspiration w/ NTL trials, pt  declined pureed trials  -        ([facility]) Patient will tolerate therapeutic trials of    Consistencies Trialed (Tolerate therapeutic trials) mechanical ground textures  - mechanical ground textures  -     Desired Outcome (Tolerate therapeutic trials) without signs/symptoms of aspiration;with adequate oral prep/transit/clearance  - without signs/symptoms of aspiration;with adequate oral prep/transit/clearance  -     [place] (Tolerate therapeutic trials) with 1:1 assist/ supervision  - with 1:1 assist/ supervision  -     Time Frame (Tolerate therapeutic trials) by discharge  - by discharge  -     Progress/Outcomes (Tolerate therapeutic trials) goal ongoing  - goal ongoing  -        ([facility]) Lingual Strengthening Goal 1 (SLP)    Activity (Lingual Strengthening Goal 1, SLP) increase lingual tone/sensation/control/coordination/movement  - increase lingual tone/sensation/control/coordination/movement  -     Increase Lingual Tone/Sensation/Control/Coordination/Movement lingual resistance exercises  - lingual resistance exercises  -     [place]/Accuracy (Lingual Strengthening Goal 1, SLP) with moderate cues (50-74% accuracy)  - with moderate cues (50-74% accuracy)  -     Time Frame (Lingual Strengthening Goal 1, SLP) short term goal (STG)  - short term goal (STG)  -     Progress/Outcomes (Lingual Strengthening Goal 1, SLP) goal ongoing  - progress slower than expected  -        ([facility]) Pharyngeal Strengthening Exercise Goal 1 (SLP)    Activity (Pharyngeal Strengthening Goal 1, SLP) increase timing  - increase timing  -     Increase Timing prepping - 3 second prep or suck swallow or 3-step swallow  - prepping - 3 second prep or suck swallow or 3-step swallow  -     [place]/Accuracy (Pharyngeal Strengthening Goal 1, SLP) with moderate cues (50-74% accuracy)  - with moderate cues (50-74% accuracy)  -     Time Frame (Pharyngeal Strengthening Goal 1, SLP) short term  goal (STG)  - --     Progress/Outcomes (Pharyngeal Strengthening Goal 1, SLP) goal ongoing  - progress slower than expected  -     Comment (Pharyngeal Strengthening Goal 1, SLP) -- Pt u/a to complete despite max cues  -               User Key  (r) = Recorded By, (t) = Taken By, (c) = Cosigned By      Initials Name Provider Type    Kathy Grossman MS CCC-SLP Speech and Language Pathologist                       Time Calculation:    Time Calculation- SLP       Row Name 02/13/24 1001             Time Calculation- [facility]    SLP Start Time 0910  -      SLP Received On 02/13/24  -         Untimed Charges    [id number] Eval Oral Pharyng Swallow Minutes 40  -         Total Minutes    Untimed Charges Total Minutes 40  -       Total Minutes 40  -                User Key  (r) = Recorded By, (t) = Taken By, (c) = Cosigned By      Initials Name Provider Type    Kathy Grossman MS CCC-SLP Speech and Language Pathologist                    Therapy Charges for Today       Code Description Service Date Service Provider Modifiers Qty    [id number] HC ST TREATMENT SWALLOW 3 2/12/2024 Kathy Gonzales MS CCC-SLP GN 1    [id number] HC ST EVAL ORAL PHARYNG SWALLOW 3 2/13/2024 Kathy Gonzales, MS CCC-SLP GN 1                 MS JIM Cazares  2/13/2024

## 2024-02-13 NOTE — DISCHARGE SUMMARY
Psychiatric Medicine Services  DISCHARGE SUMMARY    Patient Name: Omkar Nava  : 1955  MRN: [id number]    Date of Admission: 2024 11:31 AM  Date of Discharge:  2024  Primary Care Physician: Deann Cao MD    Consults       Date and Time Order Name Status Description    2024  1:45 PM Inpatient Cardiology Consult Completed     2024  3:21 PM Inpatient Neurology Consult General Completed             Hospital Course       Active Hospital Problems    Diagnosis  POA    **Encephalopathy [G93.40]  Yes    Bilateral pneumonia [J18.9]  Yes    Acute respiratory failure with hypoxia [J96.01]  Yes    Chronic kidney disease [N18.9]  Yes    Dementia [F03.90]  Yes    Anemia of chronic disease [D63.8]  Yes    Essential hypertension [I10]  Yes    Type 2 diabetes mellitus [E11.9]  Yes      Resolved Hospital Problems   No resolved problems to display.        Hospital Course:  Omkar Nava is a 68 y.o. male with PMHx significant for HFrEF, DMII, chronic anemia, CKDIII, HTN, Hx of Osteomyelitis s/p transmetatarsal amputation, dementia, HTN, recent admission for PNA, heart failure exacerbation, and JAYY, who is current resident of [facility] who presented with AMS and hypoxia. CT chest was most consistent with volume overload. Patient was given IV diuresis then transitioned to PO. Patient had a tongue laceration on admission and there was concern for seizure.      AMS  H/o dementia with psychosis  -EEG negative for seizures  -s/p load w/depakote 1.5g IV x 1 per Neurology. Continue Depakote DR 375mg twice daily for behavior  -Continue trazadone nightly, hydroxyzine prn  -Continue trial of Abilify daily  -Continue Seroquel BID  - Neurology followed  -Has remained out of restraints >48hrs     Visual Deficits  - daughter reported that the patient is blind and needs to be verbally stimulated prior to physical interaction. Without a warning he may become aggiated.      Acute on  Chronic Hypoxic Respiratory Failure  Possible Exacerbation HFpEF  Dysphagia  Patient was requiring 3L due to oxygen saturation of 78% on room air on admission. He had a recent admission for pneumonia w/positive MRSA PCR, discharged on linezolid 1/21/24. CT chest most consistent with volume overload. Echo done on 1/16/24 with EF 56-60%. He received IV lasix with improvement in his respiratory status. SLP also evaluated and he underwent MBS that showed moderate oral and mild pharyngeal dysphagia, continue modified diet. Cards seen during admission per family request. The recommended diuresis and outpatient follow-up. Continue home torsemide.     JAYY on CKDIII, suspect cardiorenal syndrome  - baseline around 1.3, was 1.8 on admission, currently stable at 1.6, likely new baseline       Elevated Troponin, likely demand in setting of above  - EKG appears unchanged from prior, suspect due to above  - troponin trended down     HTN  - continue coreg, norvasc, hydralazine, meds have been adjusted     DMII  -Continue home regimen     Discharge Follow Up Recommendations for outpatient labs/diagnostics:  Follow-up with PCP in 1 week  Follow-up with cardiology as previously scheduled    Day of Discharge     HPI: No acute events overnight, patient said that he rested well, has been off restraints      Vital Signs:   Temp:  [97.2 °F (36.2 °C)-98.6 °F (37 °C)] 97.4 °F (36.3 °C)  Heart Rate:  [57-69] 69  Resp:  [17-18] 17  BP: (103-174)/(49-99) 174/86      Physical Exam:  Constitutional: No acute distress, awake, alert  HENT: NCAT, mucous membranes moist  Respiratory: Clear to auscultation bilaterally, respiratory effort normal   Cardiovascular: RRR, no murmurs, rubs, or gallops  Gastrointestinal: Positive bowel sounds, soft, nontender, nondistended  Musculoskeletal: No bilateral ankle edema  Psychiatric: Appropriate affect, cooperative  Neurologic: Awake alert and appropriate  Skin: No rashes      Pertinent  and/or Most Recent  Results     LAB RESULTS:      Lab 02/08/24  0638 02/06/24  1144   WBC 4.33 6.30   HEMOGLOBIN 8.6* 8.1*   HEMATOCRIT 25.8* 25.6*   PLATELETS 271 214   NEUTROS ABS  --  4.50   IMMATURE GRANS (ABS)  --  0.02   LYMPHS ABS  --  1.03   MONOS ABS  --  0.52   EOS ABS  --  0.21   MCV 87.5 93.1         Lab 02/11/24  0903 02/10/24  0351 02/09/24  0616 02/08/24  0638 02/06/24  1144   SODIUM 140 138 138 140 141   POTASSIUM 4.4 4.5 4.3 4.5 4.9   CHLORIDE 104 104 102 103 104   CO2 26.0 22.0 27.0 25.0 26.0   ANION GAP 10.0 12.0 9.0 12.0 11.0   BUN 24* 24* 30* 25* 26*   CREATININE 1.63* 1.57* 1.68* 1.53* 1.43*   EGFR 46.2* 48.3* 44.5* 49.8* 54.0*   GLUCOSE 142* 114* 120* 89 185*   CALCIUM 9.2 9.1 9.0 8.8 8.9         Lab 02/08/24  0638   TOTAL PROTEIN 6.4   ALBUMIN 3.4*   GLOBULIN 3.0   ALT (SGPT) 26   AST (SGOT) 22   BILIRUBIN 0.2   ALK PHOS 81                     Brief Urine Lab Results  (Last result in the past 365 days)        Color   Clarity   Blood   Leuk Est   Nitrite   Protein   CREAT   Urine HCG        02/05/24 1146 Yellow   Clear   Negative   Negative   Negative   Negative                 Microbiology Results (last 10 days)       Procedure Component Value - Date/Time    MRSA Screen, PCR (Inpatient) - Swab, Nares [[id number]]  (Abnormal) Collected: 02/05/24 1723    Lab Status: Final result Specimen: Swab from Nares Updated: 02/06/24 0104     MRSA PCR MRSA Detected    Narrative:      The negative predictive value of this diagnostic test is high and should only be used to consider de-escalating anti-MRSA therapy. A positive result may indicate colonization with MRSA and must be correlated clinically.    Respiratory Panel PCR w/COVID-19(SARS-CoV-2) GIAN[facility]/ANNA/PAD/COR/ASHIA In-House, NP Swab in UTM/VTM, 2 HR TAT - Swab, Nasopharynx [[id number]]  (Normal) Collected: 02/05/24 1720    Lab Status: Final result Specimen: Swab from Nasopharynx Updated: 02/05/24 1810     ADENOVIRUS, PCR Not Detected     Coronavirus 229E Not Detected      Coronavirus HKU1 Not Detected     Coronavirus NL63 Not Detected     Coronavirus OC43 Not Detected     COVID19 Not Detected     Human Metapneumovirus Not Detected     Human Rhinovirus/Enterovirus Not Detected     Influenza A PCR Not Detected     Influenza B PCR Not Detected     Parainfluenza Virus 1 Not Detected     Parainfluenza Virus 2 Not Detected     Parainfluenza Virus 3 Not Detected     Parainfluenza Virus 4 Not Detected     RSV, PCR Not Detected     Bordetella pertussis pcr Not Detected     Bordetella parapertussis PCR Not Detected     Chlamydophila pneumoniae PCR Not Detected     Mycoplasma pneumo by PCR Not Detected    Narrative:      In the setting of a positive respiratory panel with a viral infection PLUS a negative procalcitonin without other underlying concern for bacterial infection, consider observing off antibiotics or discontinuation of antibiotics and continue supportive care. If the respiratory panel is positive for atypical bacterial infection (Bordetella pertussis, Chlamydophila pneumoniae, or Mycoplasma pneumoniae), consider antibiotic de-escalation to target atypical bacterial infection.    S. Pneumo Ag Urine or CSF - Urine, Urine, Clean Catch [[id number]]  (Normal) Collected: 02/05/24 1718    Lab Status: Final result Specimen: Urine, Clean Catch Updated: 02/06/24 0101     Strep Pneumo Ag Negative    Legionella Antigen, Urine - Urine, Urine, Clean Catch [[id number]]  (Normal) Collected: 02/05/24 1718    Lab Status: Final result Specimen: Urine, Clean Catch Updated: 02/06/24 0101     LEGIONELLA ANTIGEN, URINE Negative    Blood Culture - Blood, Arm, Right [[id number]]  (Normal) Collected: 02/05/24 1320    Lab Status: Final result Specimen: Blood from Arm, Right Updated: 02/10/24 1401     Blood Culture No growth at 5 days    Blood Culture - Blood, Arm, Left [[id number]]  (Normal) Collected: 02/05/24 1320    Lab Status: Final result Specimen: Blood from Arm, Left Updated: 02/10/24 1401     Blood  Culture No growth at 5 days    COVID PRE-OP / PRE-PROCEDURE SCREENING ORDER (NO ISOLATION) - Swab, Nasopharynx [[id number]]  (Normal) Collected: 02/05/24 1302    Lab Status: Final result Specimen: Swab from Nasopharynx Updated: 02/05/24 1430    Narrative:      The following orders were created for panel order COVID PRE-OP / PRE-PROCEDURE SCREENING ORDER (NO ISOLATION) - Swab, Nasopharynx.  Procedure                               Abnormality         Status                     ---------                               -----------         ------                     COVID-19 and FLU A/B PCR...[[id number]]  Normal              Final result                 Please view results for these tests on the individual orders.    COVID-19 and FLU A/B PCR, 1 HR TAT - Swab, Nasopharynx [[id number]]  (Normal) Collected: 02/05/24 1302    Lab Status: Final result Specimen: Swab from Nasopharynx Updated: 02/05/24 1430     COVID19 Not Detected     Influenza A PCR Not Detected     Influenza B PCR Not Detected    Narrative:      Fact sheet for providers: https://www.fda.gov/media/637663/download    Fact sheet for patients: https://www.fda.gov/media/239614/download    Test performed by PCR.            FL Video Swallow With Speech Single Contrast    Result Date: 2/6/2024  FL VIDEO SWALLOW W SPEECH SINGLE-CONTRAST Date of Exam: 2/6/2024 10:52 AM EST Indication: dysphagia. Comparison: None available. Technique:   The speech pathologist administered food and/or liquid mixed with barium to the patient with cine/video imaging.  Imaging assistance was provided to the speech pathologist and an image was saved. Fluoroscopic Time: 1 minute 18 seconds Number of Images: 6 cine loops Findings: The patient was evaluated in the seated lateral position while taking a variety of consistencies by mouth under the direction of speech pathology. Please see the speech pathologist's procedure report for full details and recommendations. Near the end of the study, there  was trace aspiration while taking multiple drinks of thin liquids from a straw. No aspiration was seen with the remaining swallowing attempts.     Impression: Fluoroscopy provided during modified barium swallow. Electronically Signed: Wisam Oquendo MD  2/6/2024 11:43 AM EST  Workstation ID: [id number]    EEG    Result Date: 2/5/2024  Reason for referral: 68 y.o.male with altered mental status, consideration of seizures Technical Summary:  A 19 channel digital EEG was performed using the international 10-20 placement system, including eye leads and EKG leads. Duration: 20 minutes Findings: The patient is awake and resting comfortably in bed.  The background shows diffuse medium amplitude 5 to 6 Hz theta which is present symmetrically over both hemispheres.  A clear posterior rhythm is not seen.  EMG artifact is variably prominent over the temporal leads.  Drowsiness is seen with mild slowing of the tracing but stage II sleep is not seen.  No focal features are present.  No epileptiform activity is seen.  Photic stimulation does not change the background.  Hyperventilation is not performed. Video: Available Technical quality: Superior EKG: Irregular, 50-80 bpm SUMMARY: Mild generalized slow No focal features or epileptiform activity are seen     Diffuse cerebral dysfunction of mild degree, nonspecific.  This finding is most commonly seen with encephalopathy due to toxic/metabolic cause No evidence for epilepsy is seen This report is transcribed using the Dragon dictation system.      CT Chest Without Contrast Diagnostic    Result Date: 2/5/2024  CT CHEST WO CONTRAST DIAGNOSTIC Date of Exam: 2/5/2024 2:25 PM EST Indication: Pneumonia, complication suspected, xray done. Comparison: Plain film of earlier today. CT chest of 5/6/2017.. Technique: Axial CT images were obtained of the chest without contrast administration.  Reconstructed coronal and sagittal images were also obtained. Automated exposure control and iterative  construction methods were used. Findings: There is moderate cardiomegaly. There is a very small pericardial effusion. There are small bilateral pleural effusions. There is artifact from the patient's arms. There are multiple mediastinal nodes. A precarinal node measures 15 mm in short axis. An AP window node measures 13 mm in short axis. There is mild subcarinal adenopathy. There is pulmonary vascular congestion. There are groundglass infiltrates in the bilateral upper and lower lobes. There is compressive atelectasis in the lower lobes.     Impression: Findings are compatible with CHF with moderate degree of pulmonary edema. Small effusions. Mild nonspecific mediastinal adenopathy. Electronically Signed: Jennifer Bo MD  2/5/2024 2:49 PM EST  Workstation ID: [id number]    XR Chest 1 View    Result Date: 2/5/2024  XR CHEST 1 VW Date of Exam: 2/5/2024 12:05 PM EST Indication: Weak/Dizzy/AMS triage protocol Comparison: 1/15/2024 Findings: Cardiac silhouette is magnified. Extensive bilateral airspace disease may represent edema or multifocal infiltrates. No significant pleural effusion or pneumothorax. No acute osseous lesion is seen.     Impression: Extensive bilateral airspace disease may represent edema or multifocal infiltrates. Electronically Signed: Ryan Perales MD  2/5/2024 12:21 PM EST  Workstation ID: [id number]    CT Cervical Spine Without Contrast    Result Date: 2/5/2024  CT CERVICAL SPINE WO CONTRAST Date of Exam: 2/5/2024 11:50 AM EST Indication: Bone mass or bone pain, cervical spine, aggressive features on xray. Comparison: None available. Technique: Axial CT images were obtained of the cervical spine without contrast administration.  Reconstructed coronal and sagittal images were also obtained. Automated exposure control and iterative construction methods were used. FINDINGS: Examination is somewhat limited due to motion artifact. No acute fracture or subluxation is identified. Mild cervical  spondylosis is present. Alignment is otherwise unremarkable. Vertebral body heights are maintained. The craniocervical and atlantoaxial junctions appear within normal limits. The prevertebral and paraspinal soft tissues are without focal abnormality. Carotid calcifications are present. Please refer to the accompanying head CT for description of intracranial findings.     1.Examination is somewhat limited due to motion artifact. 2.No acute fracture or subluxation identified within the cervical spine. 3.Mild cervical spondylosis. Electronically Signed: Ryan Perales MD  2/5/2024 12:10 PM EST  Workstation ID: [id number]    CT Head Without Contrast    Result Date: 2/5/2024  CT HEAD WO CONTRAST Date of Exam: 2/5/2024 11:50 AM EST Indication: Mental status change, unknown cause. Comparison: Head CT dated 1/20/2024 Technique: Axial CT images were obtained of the head without contrast administration.  Automated exposure control and iterative construction methods were used. FINDINGS:  Foci of periventricular and subcortical white matter hypoattenuation are consistent with chronic, microvascular ischemic change. There is age-related loss of brain parenchymal volume with prominence of sulcation and ventriculomegaly. No significant mass effect, midline shift, intracranial hemorrhage, or hydrocephalus is identified. No extra-axial fluid collection is identified.   The calvarium and overlying soft tissues are unremarkable. Mild scattered paranasal sinus mucosal thickening. Surgical changes of the right globe. Orbital structures demonstrate no acute abnormality.     1.No acute intracranial abnormality is identified. 2.Findings compatible with chronic microvascular ischemic change and diffuse cortical atrophy. 3.Mild scattered paranasal sinus mucosal thickening. Electronically Signed: Ryan ePrales MD  2/5/2024 12:06 PM EST  Workstation ID: [id number]             Results for orders placed during the hospital encounter of  01/15/24    Adult Transthoracic Echo Complete With Contrast if Necessary Per Protocol    Interpretation Summary    Left ventricular ejection fraction appears to be 56 - 60%.    Left ventricular wall thickness is consistent with hypertrophy.    Estimated right ventricular systolic pressure from tricuspid regurgitation is mildly elevated (35-45 mmHg).    There is a small (1-2cm) pericardial effusion.    No evidence for pericardial tamponade      Plan for Follow-up of Pending Labs/Results:     Discharge Details        Discharge Medications        New Medications        Instructions Start Date   ARIPiprazole 5 MG tablet  Commonly known as: ABILIFY   5 mg, Oral, Daily      QUEtiapine 25 MG tablet  Commonly known as: SEROquel   12.5 mg, Oral, Daily      QUEtiapine 25 MG tablet  Commonly known as: SEROquel   25 mg, Oral, Nightly             Changes to Medications        Instructions Start Date   carvedilol 12.5 MG tablet  Commonly known as: COREG  What changed:   medication strength  how much to take   12.5 mg, Oral, 2 Times Daily With Meals      hydrALAZINE 50 MG tablet  Commonly known as: APRESOLINE  What changed: medication strength   50 mg, Oral, 3 Times Daily      Insulin Lispro 100 UNIT/ML injection  Commonly known as: humaLOG  What changed:   how much to take  how to take this  when to take this  additional instructions   2-7 Units, Subcutaneous, 4 Times Daily Before Meals & Nightly      traZODone 50 MG tablet  Commonly known as: DESYREL  What changed: additional instructions   25 mg, Oral, Nightly             Continue These Medications        Instructions Start Date   acetaminophen 325 MG tablet  Commonly known as: TYLENOL   650 mg, Oral, Every 6 Hours PRN      albuterol sulfate  (90 Base) MCG/ACT inhaler  Commonly known as: PROVENTIL HFA;VENTOLIN HFA;PROAIR HFA   2 puffs, Inhalation, Every 4 Hours PRN      amLODIPine 10 MG tablet  Commonly known as: NORVASC   10 mg, Oral, Every 24 Hours Scheduled       atorvastatin 20 MG tablet  Commonly known as: LIPITOR   20 mg, Oral, Nightly      brimonidine-timolol 0.2-0.5 % ophthalmic solution  Commonly known as: COMBIGAN   1 drop, Both Eyes, 2 Times Daily      Cholecalciferol 50 MCG (2000 UT) tablet   2 capsules, Oral, Every Morning      cloNIDine 0.1 MG tablet  Commonly known as: CATAPRES   0.1 mg, Oral, Every 6 Hours PRN, AS NEEDED FOR SBP GREATER THAN 160      divalproex 125 MG DR tablet  Commonly known as: DEPAKOTE   375 mg, Oral, 2 Times Daily      docusate sodium 100 MG capsule  Commonly known as: COLACE   100 mg, Oral, 2 Times Daily PRN      famotidine 20 MG tablet  Commonly known as: PEPCID   20 mg, Oral, 2 Times Daily      ferrous sulfate 325 (65 FE) MG tablet   325 mg, Oral, Daily With Breakfast      FLUoxetine 20 MG capsule  Commonly known as: PROzac   20 mg, Oral, 2 Times Daily, Resume on 1/25/2024      hydrOXYzine 50 MG tablet  Commonly known as: ATARAX   50 mg, Oral, Every 6 Hours PRN      melatonin 5 MG tablet tablet   5 mg, Oral, Nightly      Remedy Antimicrobial Cleanser 0.12 % liquid  Generic drug: Benzalkonium Chloride   Apply to americo area topically as needed for preventative use for americo care after each incontinent episode and as needed.      Remedy Nutrashield 1 % cream  Generic drug: dimethicone   Apply to buttocks/Americo area topically as needed for preventative Apply topically to buttocks/americo area.      Remedy Skin Repair 1.5 % cream  Generic drug: Dimethicone   Apply to body topically as needed for dry skin every shift as needed.      thiamine 100 MG tablet  tablet  Commonly known as: VITAMIN B-1   100 mg, Oral, 3 Times Daily      torsemide 10 MG tablet  Commonly known as: DEMADEX   10 mg, Oral, Daily               No Known Allergies      Discharge Disposition:[facility] ([place] - External)    Diet:  Hospital:  Diet Order   Procedures    Diet: Diabetic Diets, Cardiac Diets; Healthy Heart (2-3 Na+); Consistent Carbohydrate; No  Mixed Consistencies, Feeding Assistance - Nursing, No Straw; Texture: Pureed (NDD 1); Fluid Consistency: Nectar Thick          Activity:as tolerated      Restrictions or Other Recommendations:  None       CODE STATUS:    Code Status and Medical Interventions:   Ordered at: 02/05/24 1326     Level Of Support Discussed With:    Health Care Surrogate     Code Status (Patient has no pulse and is not breathing):    CPR (Attempt to Resuscitate)     Medical Interventions (Patient has pulse or is breathing):    Full Support       Future Appointments   Date Time Provider Department Center   2/13/2024 11:00 AM MED 8  [facility]       Additional Instructions for the Follow-ups that You Need to Schedule       Discharge Follow-up with Specialty: cardiology; 1 Week   As directed      Specialty: cardiology   Follow Up: 1 Week   Follow Up Details: Dr. Mcpherson, 1-2 weeks                  Estela Dos Santos MD  02/13/24      Time Spent on Discharge:  I spent  35  minutes on this discharge activity which included: face-to-face encounter with the patient, reviewing the data in the system, coordination of the care with the nursing staff as well as consultants, documentation, and entering orders.

## 2024-02-13 NOTE — PLAN OF CARE
Goal Outcome Evaluation:                     Anticipated Discharge Disposition (SLP): skilled nursing facility          SLP Swallowing Diagnosis: oral dysphagia, suspected pharyngeal dysphagia (02/13/24 6089)

## 2024-02-13 NOTE — PROGRESS NOTES
Report called to director of nursing at [facility] at 11:10am. Pt left via EMS transport for [facility] at 11:07am.

## 2024-02-13 NOTE — CASE MANAGEMENT/SOCIAL WORK
Case Management Discharge Note      Final Note: Pt has remained appropriate to discharge back to [facility] today. MSW confirmed with Magaly with [facility] pt may return. Pt has an 11am ambualnce with [facility] ambulance. PCS form has been sent electronically so EMS staff already has. [facility] staff will get discharge summary out of EPIC. Number to call report is [phone]. MSW called and updated pt's daughter who remains agreeable to plan.         Selected Continued Care - Admitted Since 2/5/2024       Destination Coordination complete.      Service Provider Selected Services Address Phone Fax Patient Preferred    [facility] [address] [facility] [address] [phone] --              Durable Medical Equipment    No services have been selected for the patient.                Dialysis/Infusion    No services have been selected for the patient.                Home Medical Care    No services have been selected for the patient.                Therapy    No services have been selected for the patient.                Community Resources    No services have been selected for the patient.                Community & DME    No services have been selected for the patient.                    Selected Continued Care - Prior Encounters Includes continued care and service providers with selected services from prior encounters from 11/7/2023 to 2/13/2024      Discharged on 1/21/2024 Admission date: 1/15/2024 - Discharge disposition: Skilled Nursing Facility ([place] - External)      Destination       Service Provider Selected Services Address Phone Fax Patient Preferred    [facility] [address] [facility] [address] [phone] --       Internal Comment last updated by Rosemarie Yarbrough, RN 1/18/2024 1150    Does not have a bed hold    1/18: Spoke with Keyla, [facility]EMA to LTC for now. Not med ready today.                                            Final Discharge Disposition Code: 04 - intermediate care facility

## 2024-02-22 ENCOUNTER — OFFICE VISIT (OUTPATIENT)
Dept: CARDIOLOGY | Facility: CLINIC | Age: 69
End: 2024-02-22
Payer: MEDICARE

## 2024-02-22 VITALS
HEIGHT: 60 IN | HEART RATE: 63 BPM | SYSTOLIC BLOOD PRESSURE: 148 MMHG | DIASTOLIC BLOOD PRESSURE: 74 MMHG | BODY MASS INDEX: 36.71 KG/M2 | WEIGHT: 187 LBS | OXYGEN SATURATION: 98 %

## 2024-02-22 DIAGNOSIS — I50.31 ACUTE HEART FAILURE WITH PRESERVED EJECTION FRACTION (HFPEF): Primary | ICD-10-CM

## 2024-03-10 ENCOUNTER — APPOINTMENT (OUTPATIENT)
Dept: GENERAL RADIOLOGY | Facility: HOSPITAL | Age: 69
End: 2024-03-10
Payer: MEDICARE

## 2024-03-10 ENCOUNTER — HOSPITAL ENCOUNTER (INPATIENT)
Facility: HOSPITAL | Age: 69
LOS: 25 days | Discharge: NURSING, REHAB OR LONG-TERM CARE | End: 2024-04-04
Attending: EMERGENCY MEDICINE | Admitting: FAMILY MEDICINE
Payer: MEDICARE

## 2024-03-10 ENCOUNTER — APPOINTMENT (OUTPATIENT)
Dept: CT IMAGING | Facility: HOSPITAL | Age: 69
End: 2024-03-10
Payer: MEDICARE

## 2024-03-10 DIAGNOSIS — Z86.59 HISTORY OF DEMENTIA: ICD-10-CM

## 2024-03-10 DIAGNOSIS — G93.40 ENCEPHALOPATHY: ICD-10-CM

## 2024-03-10 DIAGNOSIS — K29.70 GASTRITIS: ICD-10-CM

## 2024-03-10 DIAGNOSIS — T68.XXXA HYPOTHERMIA, INITIAL ENCOUNTER: ICD-10-CM

## 2024-03-10 DIAGNOSIS — E46 PROTEIN-CALORIE MALNUTRITION, UNSPECIFIED SEVERITY: ICD-10-CM

## 2024-03-10 DIAGNOSIS — J18.9 PNEUMONIA DUE TO INFECTIOUS ORGANISM, UNSPECIFIED LATERALITY, UNSPECIFIED PART OF LUNG: ICD-10-CM

## 2024-03-10 DIAGNOSIS — E87.5 HYPERKALEMIA: ICD-10-CM

## 2024-03-10 DIAGNOSIS — D50.9 IRON DEFICIENCY ANEMIA, UNSPECIFIED IRON DEFICIENCY ANEMIA TYPE: ICD-10-CM

## 2024-03-10 DIAGNOSIS — E11.40 TYPE 2 DIABETES MELLITUS WITH DIABETIC NEUROPATHY, UNSPECIFIED WHETHER LONG TERM INSULIN USE: ICD-10-CM

## 2024-03-10 DIAGNOSIS — R13.10 DYSPHAGIA, UNSPECIFIED TYPE: ICD-10-CM

## 2024-03-10 DIAGNOSIS — J96.01 ACUTE RESPIRATORY FAILURE WITH HYPOXIA: Primary | ICD-10-CM

## 2024-03-10 DIAGNOSIS — D64.9 ANEMIA, UNSPECIFIED TYPE: ICD-10-CM

## 2024-03-10 PROBLEM — N18.9 ACUTE KIDNEY INJURY SUPERIMPOSED ON CHRONIC KIDNEY DISEASE: Status: ACTIVE | Noted: 2024-03-10

## 2024-03-10 PROBLEM — N17.9 ACUTE KIDNEY INJURY SUPERIMPOSED ON CHRONIC KIDNEY DISEASE: Status: ACTIVE | Noted: 2024-03-10

## 2024-03-10 PROBLEM — R00.1 BRADYCARDIA: Status: ACTIVE | Noted: 2024-03-10

## 2024-03-10 LAB
ALBUMIN SERPL-MCNC: 3.8 G/DL (ref 3.5–5.2)
ALBUMIN/GLOB SERPL: 1.1 G/DL
ALP SERPL-CCNC: 89 U/L (ref 39–117)
ALT SERPL W P-5'-P-CCNC: 46 U/L (ref 1–41)
ANION GAP SERPL CALCULATED.3IONS-SCNC: 6 MMOL/L (ref 5–15)
ANION GAP SERPL CALCULATED.3IONS-SCNC: 7 MMOL/L (ref 5–15)
ANION GAP SERPL CALCULATED.3IONS-SCNC: 8 MMOL/L (ref 5–15)
ARTERIAL PATENCY WRIST A: ABNORMAL
ARTERIAL PATENCY WRIST A: POSITIVE
ARTERIAL PATENCY WRIST A: POSITIVE
AST SERPL-CCNC: 32 U/L (ref 1–40)
ATMOSPHERIC PRESS: ABNORMAL MM[HG]
B PARAPERT DNA SPEC QL NAA+PROBE: NOT DETECTED
B PERT DNA SPEC QL NAA+PROBE: NOT DETECTED
BACTERIA UR QL AUTO: NORMAL /HPF
BASE EXCESS BLDA CALC-SCNC: -3 MMOL/L (ref 0–2)
BASE EXCESS BLDA CALC-SCNC: -3.2 MMOL/L (ref 0–2)
BASE EXCESS BLDA CALC-SCNC: -4.8 MMOL/L (ref 0–2)
BASOPHILS # BLD AUTO: 0.01 10*3/MM3 (ref 0–0.2)
BASOPHILS NFR BLD AUTO: 0.2 % (ref 0–1.5)
BDY SITE: ABNORMAL
BILIRUB SERPL-MCNC: <0.2 MG/DL (ref 0–1.2)
BILIRUB UR QL STRIP: NEGATIVE
BODY TEMPERATURE: 37
BUN SERPL-MCNC: 62 MG/DL (ref 8–23)
BUN SERPL-MCNC: 63 MG/DL (ref 8–23)
BUN SERPL-MCNC: 63 MG/DL (ref 8–23)
BUN/CREAT SERPL: 32.1 (ref 7–25)
BUN/CREAT SERPL: 32.1 (ref 7–25)
BUN/CREAT SERPL: 35.4 (ref 7–25)
C PNEUM DNA NPH QL NAA+NON-PROBE: NOT DETECTED
CALCIUM SPEC-SCNC: 8.8 MG/DL (ref 8.6–10.5)
CALCIUM SPEC-SCNC: 8.8 MG/DL (ref 8.6–10.5)
CALCIUM SPEC-SCNC: 8.9 MG/DL (ref 8.6–10.5)
CHLORIDE SERPL-SCNC: 111 MMOL/L (ref 98–107)
CHLORIDE SERPL-SCNC: 112 MMOL/L (ref 98–107)
CHLORIDE SERPL-SCNC: 115 MMOL/L (ref 98–107)
CK SERPL-CCNC: 284 U/L (ref 20–200)
CLARITY UR: ABNORMAL
CO2 BLDA-SCNC: 24.3 MMOL/L (ref 22–33)
CO2 BLDA-SCNC: 24.6 MMOL/L (ref 22–33)
CO2 BLDA-SCNC: 25.7 MMOL/L (ref 22–33)
CO2 SERPL-SCNC: 20 MMOL/L (ref 22–29)
CO2 SERPL-SCNC: 21 MMOL/L (ref 22–29)
CO2 SERPL-SCNC: 22 MMOL/L (ref 22–29)
COD CRY URNS QL: NORMAL /HPF
COHGB MFR BLD: 1 % (ref 0–2)
COHGB MFR BLD: 1.1 % (ref 0–2)
COHGB MFR BLD: 1.2 % (ref 0–2)
COLOR UR: YELLOW
CREAT BLDA-MCNC: 2.1 MG/DL (ref 0.6–1.3)
CREAT SERPL-MCNC: 1.78 MG/DL (ref 0.76–1.27)
CREAT SERPL-MCNC: 1.93 MG/DL (ref 0.76–1.27)
CREAT SERPL-MCNC: 1.96 MG/DL (ref 0.76–1.27)
D-LACTATE SERPL-SCNC: 1 MMOL/L (ref 0.5–2)
DEPRECATED RDW RBC AUTO: 54.5 FL (ref 37–54)
EGFRCR SERPLBLD CKD-EPI 2021: 37 ML/MIN/1.73
EGFRCR SERPLBLD CKD-EPI 2021: 37.7 ML/MIN/1.73
EGFRCR SERPLBLD CKD-EPI 2021: 41.6 ML/MIN/1.73
EOSINOPHIL # BLD AUTO: 0.06 10*3/MM3 (ref 0–0.4)
EOSINOPHIL NFR BLD AUTO: 1.4 % (ref 0.3–6.2)
EPAP: 0
ERYTHROCYTE [DISTWIDTH] IN BLOOD BY AUTOMATED COUNT: 15.4 % (ref 12.3–15.4)
FLUAV SUBTYP SPEC NAA+PROBE: NOT DETECTED
FLUAV SUBTYP SPEC NAA+PROBE: NOT DETECTED
FLUBV RNA ISLT QL NAA+PROBE: NOT DETECTED
FLUBV RNA ISLT QL NAA+PROBE: NOT DETECTED
GEN 5 2HR TROPONIN T REFLEX: 70 NG/L
GLOBULIN UR ELPH-MCNC: 3.4 GM/DL
GLUCOSE BLDC GLUCOMTR-MCNC: 103 MG/DL (ref 70–130)
GLUCOSE BLDC GLUCOMTR-MCNC: 124 MG/DL (ref 70–130)
GLUCOSE BLDC GLUCOMTR-MCNC: 130 MG/DL (ref 70–130)
GLUCOSE BLDC GLUCOMTR-MCNC: 170 MG/DL (ref 70–130)
GLUCOSE BLDC GLUCOMTR-MCNC: 47 MG/DL (ref 70–130)
GLUCOSE BLDC GLUCOMTR-MCNC: 76 MG/DL (ref 70–130)
GLUCOSE BLDC GLUCOMTR-MCNC: 77 MG/DL (ref 70–130)
GLUCOSE SERPL-MCNC: 101 MG/DL (ref 65–99)
GLUCOSE SERPL-MCNC: 127 MG/DL (ref 65–99)
GLUCOSE SERPL-MCNC: 147 MG/DL (ref 65–99)
GLUCOSE UR STRIP-MCNC: NEGATIVE MG/DL
HADV DNA SPEC NAA+PROBE: NOT DETECTED
HCO3 BLDA-SCNC: 22.9 MMOL/L (ref 20–26)
HCO3 BLDA-SCNC: 22.9 MMOL/L (ref 20–26)
HCO3 BLDA-SCNC: 24 MMOL/L (ref 20–26)
HCOV 229E RNA SPEC QL NAA+PROBE: NOT DETECTED
HCOV HKU1 RNA SPEC QL NAA+PROBE: NOT DETECTED
HCOV NL63 RNA SPEC QL NAA+PROBE: NOT DETECTED
HCOV OC43 RNA SPEC QL NAA+PROBE: NOT DETECTED
HCT VFR BLD AUTO: 27.8 % (ref 37.5–51)
HCT VFR BLD CALC: 23.6 % (ref 38–51)
HCT VFR BLD CALC: 23.6 % (ref 38–51)
HCT VFR BLD CALC: 25.4 % (ref 38–51)
HGB BLD-MCNC: 8.4 G/DL (ref 13–17.7)
HGB BLDA-MCNC: 7.7 G/DL (ref 13.5–17.5)
HGB BLDA-MCNC: 7.7 G/DL (ref 13.5–17.5)
HGB BLDA-MCNC: 8.3 G/DL (ref 13.5–17.5)
HGB UR QL STRIP.AUTO: NEGATIVE
HMPV RNA NPH QL NAA+NON-PROBE: NOT DETECTED
HOLD SPECIMEN: NORMAL
HPIV1 RNA ISLT QL NAA+PROBE: NOT DETECTED
HPIV2 RNA SPEC QL NAA+PROBE: NOT DETECTED
HPIV3 RNA NPH QL NAA+PROBE: NOT DETECTED
HPIV4 P GENE NPH QL NAA+PROBE: NOT DETECTED
HYALINE CASTS UR QL AUTO: NORMAL /LPF
IMM GRANULOCYTES # BLD AUTO: 0.03 10*3/MM3 (ref 0–0.05)
IMM GRANULOCYTES NFR BLD AUTO: 0.7 % (ref 0–0.5)
INHALED O2 CONCENTRATION: 100 %
INHALED O2 CONCENTRATION: 36 %
INHALED O2 CONCENTRATION: 60 %
IPAP: 0
KETONES UR QL STRIP: NEGATIVE
LEUKOCYTE ESTERASE UR QL STRIP.AUTO: NEGATIVE
LYMPHOCYTES # BLD AUTO: 0.97 10*3/MM3 (ref 0.7–3.1)
LYMPHOCYTES NFR BLD AUTO: 21.9 % (ref 19.6–45.3)
Lab: ABNORMAL
M PNEUMO IGG SER IA-ACNC: NOT DETECTED
MCH RBC QN AUTO: 29.2 PG (ref 26.6–33)
MCHC RBC AUTO-ENTMCNC: 30.2 G/DL (ref 31.5–35.7)
MCV RBC AUTO: 96.5 FL (ref 79–97)
METHGB BLD QL: 0.3 % (ref 0–1.5)
METHGB BLD QL: 0.7 % (ref 0–1.5)
METHGB BLD QL: 0.9 % (ref 0–1.5)
MODALITY: ABNORMAL
MONOCYTES # BLD AUTO: 0.32 10*3/MM3 (ref 0.1–0.9)
MONOCYTES NFR BLD AUTO: 7.2 % (ref 5–12)
MRSA DNA SPEC QL NAA+PROBE: POSITIVE
NEUTROPHILS NFR BLD AUTO: 3.04 10*3/MM3 (ref 1.7–7)
NEUTROPHILS NFR BLD AUTO: 68.6 % (ref 42.7–76)
NITRITE UR QL STRIP: NEGATIVE
NOTIFIED BY: ABNORMAL
NOTIFIED WHO: ABNORMAL
NRBC BLD AUTO-RTO: 0 /100 WBC (ref 0–0.2)
NT-PROBNP SERPL-MCNC: 434.3 PG/ML (ref 0–900)
OXYHGB MFR BLDV: 86.1 % (ref 94–99)
OXYHGB MFR BLDV: 96.6 % (ref 94–99)
OXYHGB MFR BLDV: 98 % (ref 94–99)
PAW @ PEAK INSP FLOW SETTING VENT: 0 CMH2O
PCO2 BLDA: 44.2 MM HG (ref 35–45)
PCO2 BLDA: 54.9 MM HG (ref 35–45)
PCO2 BLDA: 56.4 MM HG (ref 35–45)
PCO2 TEMP ADJ BLD: 44.2 MM HG (ref 35–48)
PCO2 TEMP ADJ BLD: 54.9 MM HG (ref 35–48)
PCO2 TEMP ADJ BLD: 56.4 MM HG (ref 35–48)
PEEP RESPIRATORY: 5 CM[H2O]
PEEP RESPIRATORY: 5 CM[H2O]
PH BLDA: 7.22 PH UNITS (ref 7.35–7.45)
PH BLDA: 7.25 PH UNITS (ref 7.35–7.45)
PH BLDA: 7.32 PH UNITS (ref 7.35–7.45)
PH UR STRIP.AUTO: <=5 [PH] (ref 5–8)
PH, TEMP CORRECTED: 7.22 PH UNITS
PH, TEMP CORRECTED: 7.25 PH UNITS
PH, TEMP CORRECTED: 7.32 PH UNITS
PLATELET # BLD AUTO: 166 10*3/MM3 (ref 140–450)
PMV BLD AUTO: 11.6 FL (ref 6–12)
PO2 BLDA: 102 MM HG (ref 83–108)
PO2 BLDA: 241 MM HG (ref 83–108)
PO2 BLDA: 64.2 MM HG (ref 83–108)
PO2 TEMP ADJ BLD: 102 MM HG (ref 83–108)
PO2 TEMP ADJ BLD: 241 MM HG (ref 83–108)
PO2 TEMP ADJ BLD: 64.2 MM HG (ref 83–108)
POTASSIUM SERPL-SCNC: 6.7 MMOL/L (ref 3.5–5.2)
POTASSIUM SERPL-SCNC: 6.7 MMOL/L (ref 3.5–5.2)
POTASSIUM SERPL-SCNC: 7.4 MMOL/L (ref 3.5–5.2)
PROCALCITONIN SERPL-MCNC: 0.06 NG/ML (ref 0–0.25)
PROT SERPL-MCNC: 7.2 G/DL (ref 6–8.5)
PROT UR QL STRIP: ABNORMAL
QT INTERVAL: 478 MS
QTC INTERVAL: 427 MS
RBC # BLD AUTO: 2.88 10*6/MM3 (ref 4.14–5.8)
RBC # UR STRIP: NORMAL /HPF
REF LAB TEST METHOD: NORMAL
RENAL EPI CELLS #/AREA URNS HPF: NORMAL /HPF
RHINOVIRUS RNA SPEC NAA+PROBE: NOT DETECTED
RSV RNA NPH QL NAA+NON-PROBE: NOT DETECTED
SARS-COV-2 RNA NPH QL NAA+NON-PROBE: NOT DETECTED
SARS-COV-2 RNA RESP QL NAA+PROBE: NOT DETECTED
SODIUM SERPL-SCNC: 139 MMOL/L (ref 136–145)
SODIUM SERPL-SCNC: 140 MMOL/L (ref 136–145)
SODIUM SERPL-SCNC: 143 MMOL/L (ref 136–145)
SP GR UR STRIP: 1.03 (ref 1–1.03)
SQUAMOUS #/AREA URNS HPF: NORMAL /HPF
TOTAL RATE: 0 BREATHS/MINUTE
TOTAL RATE: 17 BREATHS/MINUTE
TOTAL RATE: 18 BREATHS/MINUTE
TROPONIN T DELTA: 7 NG/L
TROPONIN T SERPL HS-MCNC: 63 NG/L
UROBILINOGEN UR QL STRIP: ABNORMAL
VENTILATOR MODE: ABNORMAL
VENTILATOR MODE: ABNORMAL
VT ON VENT VENT: 0.42 ML
VT ON VENT VENT: 0.42 ML
WBC # UR STRIP: NORMAL /HPF
WBC NRBC COR # BLD AUTO: 4.43 10*3/MM3 (ref 3.4–10.8)
WHOLE BLOOD HOLD COAG: NORMAL
WHOLE BLOOD HOLD SPECIMEN: NORMAL

## 2024-03-10 PROCEDURE — 36600 WITHDRAWAL OF ARTERIAL BLOOD: CPT

## 2024-03-10 PROCEDURE — 25010000002 PROPOFOL 10 MG/ML EMULSION

## 2024-03-10 PROCEDURE — 94799 UNLISTED PULMONARY SVC/PX: CPT

## 2024-03-10 PROCEDURE — 0202U NFCT DS 22 TRGT SARS-COV-2: CPT | Performed by: INTERNAL MEDICINE

## 2024-03-10 PROCEDURE — 25010000002 CALCIUM GLUCONATE-NACL 1-0.675 GM/50ML-% SOLUTION

## 2024-03-10 PROCEDURE — 71045 X-RAY EXAM CHEST 1 VIEW: CPT

## 2024-03-10 PROCEDURE — 94761 N-INVAS EAR/PLS OXIMETRY MLT: CPT

## 2024-03-10 PROCEDURE — 82948 REAGENT STRIP/BLOOD GLUCOSE: CPT

## 2024-03-10 PROCEDURE — 99291 CRITICAL CARE FIRST HOUR: CPT

## 2024-03-10 PROCEDURE — 25810000003 SODIUM CHLORIDE 0.9 % SOLUTION: Performed by: INTERNAL MEDICINE

## 2024-03-10 PROCEDURE — 84484 ASSAY OF TROPONIN QUANT: CPT | Performed by: EMERGENCY MEDICINE

## 2024-03-10 PROCEDURE — 25010000002 PROCHLORPERAZINE 10 MG/2ML SOLUTION

## 2024-03-10 PROCEDURE — 87076 CULTURE ANAEROBE IDENT EACH: CPT | Performed by: EMERGENCY MEDICINE

## 2024-03-10 PROCEDURE — 0BH17EZ INSERTION OF ENDOTRACHEAL AIRWAY INTO TRACHEA, VIA NATURAL OR ARTIFICIAL OPENING: ICD-10-PCS | Performed by: INTERNAL MEDICINE

## 2024-03-10 PROCEDURE — 87636 SARSCOV2 & INF A&B AMP PRB: CPT | Performed by: EMERGENCY MEDICINE

## 2024-03-10 PROCEDURE — 82550 ASSAY OF CK (CPK): CPT | Performed by: INTERNAL MEDICINE

## 2024-03-10 PROCEDURE — 82805 BLOOD GASES W/O2 SATURATION: CPT

## 2024-03-10 PROCEDURE — 76937 US GUIDE VASCULAR ACCESS: CPT

## 2024-03-10 PROCEDURE — 02HV33Z INSERTION OF INFUSION DEVICE INTO SUPERIOR VENA CAVA, PERCUTANEOUS APPROACH: ICD-10-PCS

## 2024-03-10 PROCEDURE — 36556 INSERT NON-TUNNEL CV CATH: CPT

## 2024-03-10 PROCEDURE — 31500 INSERT EMERGENCY AIRWAY: CPT | Performed by: INTERNAL MEDICINE

## 2024-03-10 PROCEDURE — 87185 SC STD ENZYME DETCJ PER NZM: CPT | Performed by: EMERGENCY MEDICINE

## 2024-03-10 PROCEDURE — 94640 AIRWAY INHALATION TREATMENT: CPT

## 2024-03-10 PROCEDURE — 25010000002 AZITHROMYCIN PER 500 MG: Performed by: INTERNAL MEDICINE

## 2024-03-10 PROCEDURE — 80053 COMPREHEN METABOLIC PANEL: CPT | Performed by: EMERGENCY MEDICINE

## 2024-03-10 PROCEDURE — 99223 1ST HOSP IP/OBS HIGH 75: CPT | Performed by: INTERNAL MEDICINE

## 2024-03-10 PROCEDURE — 25010000002 ONDANSETRON PER 1 MG

## 2024-03-10 PROCEDURE — 25010000002 CALCIUM GLUCONATE-NACL 1-0.675 GM/50ML-% SOLUTION: Performed by: EMERGENCY MEDICINE

## 2024-03-10 PROCEDURE — 83050 HGB METHEMOGLOBIN QUAN: CPT

## 2024-03-10 PROCEDURE — 25010000002 CEFEPIME PER 500 MG: Performed by: INTERNAL MEDICINE

## 2024-03-10 PROCEDURE — 63710000001 INSULIN REGULAR HUMAN PER 5 UNITS: Performed by: EMERGENCY MEDICINE

## 2024-03-10 PROCEDURE — 63710000001 INSULIN REGULAR HUMAN PER 5 UNITS

## 2024-03-10 PROCEDURE — 71275 CT ANGIOGRAPHY CHEST: CPT

## 2024-03-10 PROCEDURE — 25010000002 HEPARIN (PORCINE) PER 1000 UNITS: Performed by: INTERNAL MEDICINE

## 2024-03-10 PROCEDURE — 81001 URINALYSIS AUTO W/SCOPE: CPT | Performed by: INTERNAL MEDICINE

## 2024-03-10 PROCEDURE — 87040 BLOOD CULTURE FOR BACTERIA: CPT | Performed by: EMERGENCY MEDICINE

## 2024-03-10 PROCEDURE — 36415 COLL VENOUS BLD VENIPUNCTURE: CPT

## 2024-03-10 PROCEDURE — 82375 ASSAY CARBOXYHB QUANT: CPT

## 2024-03-10 PROCEDURE — 83880 ASSAY OF NATRIURETIC PEPTIDE: CPT | Performed by: EMERGENCY MEDICINE

## 2024-03-10 PROCEDURE — 83874 ASSAY OF MYOGLOBIN: CPT | Performed by: INTERNAL MEDICINE

## 2024-03-10 PROCEDURE — 94002 VENT MGMT INPAT INIT DAY: CPT

## 2024-03-10 PROCEDURE — 93005 ELECTROCARDIOGRAM TRACING: CPT

## 2024-03-10 PROCEDURE — 85025 COMPLETE CBC W/AUTO DIFF WBC: CPT | Performed by: EMERGENCY MEDICINE

## 2024-03-10 PROCEDURE — 93005 ELECTROCARDIOGRAM TRACING: CPT | Performed by: EMERGENCY MEDICINE

## 2024-03-10 PROCEDURE — 74018 RADEX ABDOMEN 1 VIEW: CPT

## 2024-03-10 PROCEDURE — 25010000002 PIPERACILLIN SOD-TAZOBACTAM PER 1 G: Performed by: EMERGENCY MEDICINE

## 2024-03-10 PROCEDURE — 25510000001 IOPAMIDOL PER 1 ML: Performed by: EMERGENCY MEDICINE

## 2024-03-10 PROCEDURE — C1751 CATH, INF, PER/CENT/MIDLINE: HCPCS

## 2024-03-10 PROCEDURE — 87641 MR-STAPH DNA AMP PROBE: CPT | Performed by: INTERNAL MEDICINE

## 2024-03-10 PROCEDURE — 25010000002 VANCOMYCIN HCL IN NACL 1.75-0.9 GM/500ML-% SOLUTION

## 2024-03-10 PROCEDURE — 25810000003 SODIUM CHLORIDE 0.9 % SOLUTION 250 ML FLEX CONT: Performed by: INTERNAL MEDICINE

## 2024-03-10 PROCEDURE — 5A1955Z RESPIRATORY VENTILATION, GREATER THAN 96 CONSECUTIVE HOURS: ICD-10-PCS | Performed by: INTERNAL MEDICINE

## 2024-03-10 PROCEDURE — 84145 PROCALCITONIN (PCT): CPT | Performed by: EMERGENCY MEDICINE

## 2024-03-10 PROCEDURE — 82565 ASSAY OF CREATININE: CPT

## 2024-03-10 PROCEDURE — 83605 ASSAY OF LACTIC ACID: CPT | Performed by: EMERGENCY MEDICINE

## 2024-03-10 PROCEDURE — 93010 ELECTROCARDIOGRAM REPORT: CPT | Performed by: INTERNAL MEDICINE

## 2024-03-10 RX ORDER — DEXTROSE MONOHYDRATE 25 G/50ML
INJECTION, SOLUTION INTRAVENOUS
Status: COMPLETED
Start: 2024-03-10 | End: 2024-03-10

## 2024-03-10 RX ORDER — VANCOMYCIN 1.75 GRAM/500 ML IN 0.9 % SODIUM CHLORIDE INTRAVENOUS
20 ONCE
Status: DISCONTINUED | OUTPATIENT
Start: 2024-03-10 | End: 2024-03-10

## 2024-03-10 RX ORDER — PANTOPRAZOLE SODIUM 40 MG/10ML
40 INJECTION, POWDER, LYOPHILIZED, FOR SOLUTION INTRAVENOUS
Status: DISCONTINUED | OUTPATIENT
Start: 2024-03-11 | End: 2024-03-14

## 2024-03-10 RX ORDER — HEPARIN SODIUM 5000 [USP'U]/ML
5000 INJECTION, SOLUTION INTRAVENOUS; SUBCUTANEOUS EVERY 8 HOURS SCHEDULED
Status: DISCONTINUED | OUTPATIENT
Start: 2024-03-10 | End: 2024-03-14

## 2024-03-10 RX ORDER — PROCHLORPERAZINE EDISYLATE 5 MG/ML
5 INJECTION INTRAMUSCULAR; INTRAVENOUS EVERY 6 HOURS PRN
Status: DISCONTINUED | OUTPATIENT
Start: 2024-03-10 | End: 2024-04-04 | Stop reason: HOSPADM

## 2024-03-10 RX ORDER — SODIUM CHLORIDE 0.9 % (FLUSH) 0.9 %
10 SYRINGE (ML) INJECTION AS NEEDED
Status: DISCONTINUED | OUTPATIENT
Start: 2024-03-10 | End: 2024-03-23

## 2024-03-10 RX ORDER — DEXTROSE MONOHYDRATE 25 G/50ML
25 INJECTION, SOLUTION INTRAVENOUS
Status: DISCONTINUED | OUTPATIENT
Start: 2024-03-10 | End: 2024-03-13

## 2024-03-10 RX ORDER — ONDANSETRON 2 MG/ML
4 INJECTION INTRAMUSCULAR; INTRAVENOUS ONCE
Status: COMPLETED | OUTPATIENT
Start: 2024-03-10 | End: 2024-03-10

## 2024-03-10 RX ORDER — BISACODYL 5 MG/1
5 TABLET, DELAYED RELEASE ORAL DAILY PRN
Status: DISCONTINUED | OUTPATIENT
Start: 2024-03-10 | End: 2024-03-11

## 2024-03-10 RX ORDER — SODIUM CHLORIDE 9 MG/ML
40 INJECTION, SOLUTION INTRAVENOUS AS NEEDED
Status: DISCONTINUED | OUTPATIENT
Start: 2024-03-10 | End: 2024-03-23

## 2024-03-10 RX ORDER — DEXTROSE MONOHYDRATE 25 G/50ML
25 INJECTION, SOLUTION INTRAVENOUS ONCE
Status: COMPLETED | OUTPATIENT
Start: 2024-03-10 | End: 2024-03-10

## 2024-03-10 RX ORDER — BISACODYL 10 MG
10 SUPPOSITORY, RECTAL RECTAL DAILY PRN
Status: DISCONTINUED | OUTPATIENT
Start: 2024-03-10 | End: 2024-03-11

## 2024-03-10 RX ORDER — CHLORHEXIDINE GLUCONATE ORAL RINSE 1.2 MG/ML
15 SOLUTION DENTAL EVERY 12 HOURS SCHEDULED
Status: DISCONTINUED | OUTPATIENT
Start: 2024-03-10 | End: 2024-03-24

## 2024-03-10 RX ORDER — SODIUM CHLORIDE 9 MG/ML
100 INJECTION, SOLUTION INTRAVENOUS CONTINUOUS
Status: DISCONTINUED | OUTPATIENT
Start: 2024-03-10 | End: 2024-03-11

## 2024-03-10 RX ORDER — TIMOLOL MALEATE 5 MG/ML
1 SOLUTION/ DROPS OPHTHALMIC 2 TIMES DAILY
Status: DISCONTINUED | OUTPATIENT
Start: 2024-03-10 | End: 2024-04-04 | Stop reason: HOSPADM

## 2024-03-10 RX ORDER — NOREPINEPHRINE BITARTRATE 0.03 MG/ML
.02-.3 INJECTION, SOLUTION INTRAVENOUS
Status: DISCONTINUED | OUTPATIENT
Start: 2024-03-10 | End: 2024-03-18

## 2024-03-10 RX ORDER — NICOTINE POLACRILEX 4 MG
15 LOZENGE BUCCAL
Status: DISCONTINUED | OUTPATIENT
Start: 2024-03-10 | End: 2024-03-11

## 2024-03-10 RX ORDER — ALBUTEROL SULFATE 2.5 MG/3ML
10 SOLUTION RESPIRATORY (INHALATION) ONCE
Status: COMPLETED | OUTPATIENT
Start: 2024-03-10 | End: 2024-03-10

## 2024-03-10 RX ORDER — CALCIUM GLUCONATE 20 MG/ML
1000 INJECTION, SOLUTION INTRAVENOUS ONCE
Status: COMPLETED | OUTPATIENT
Start: 2024-03-10 | End: 2024-03-10

## 2024-03-10 RX ORDER — NITROGLYCERIN 0.4 MG/1
0.4 TABLET SUBLINGUAL
Status: DISCONTINUED | OUTPATIENT
Start: 2024-03-10 | End: 2024-03-23

## 2024-03-10 RX ORDER — IBUPROFEN 600 MG/1
1 TABLET ORAL
Status: DISCONTINUED | OUTPATIENT
Start: 2024-03-10 | End: 2024-03-13

## 2024-03-10 RX ORDER — SODIUM CHLORIDE 0.9 % (FLUSH) 0.9 %
10 SYRINGE (ML) INJECTION EVERY 12 HOURS SCHEDULED
Status: DISCONTINUED | OUTPATIENT
Start: 2024-03-10 | End: 2024-03-23

## 2024-03-10 RX ORDER — POLYETHYLENE GLYCOL 3350 17 G/17G
17 POWDER, FOR SOLUTION ORAL DAILY PRN
Status: DISCONTINUED | OUTPATIENT
Start: 2024-03-10 | End: 2024-03-11

## 2024-03-10 RX ORDER — KETAMINE HCL IN NACL, ISO-OSM 100MG/10ML
100 SYRINGE (ML) INJECTION ONCE
Status: COMPLETED | OUTPATIENT
Start: 2024-03-10 | End: 2024-03-10

## 2024-03-10 RX ORDER — PROPOFOL 10 MG/ML
VIAL (ML) INTRAVENOUS
Status: COMPLETED
Start: 2024-03-10 | End: 2024-03-10

## 2024-03-10 RX ORDER — VANCOMYCIN 1.75 GRAM/500 ML IN 0.9 % SODIUM CHLORIDE INTRAVENOUS
1750 ONCE
Qty: 500 ML | Refills: 0 | Status: COMPLETED | OUTPATIENT
Start: 2024-03-10 | End: 2024-03-10

## 2024-03-10 RX ORDER — NOREPINEPHRINE BITARTRATE 0.03 MG/ML
INJECTION, SOLUTION INTRAVENOUS
Status: DISPENSED
Start: 2024-03-10 | End: 2024-03-11

## 2024-03-10 RX ORDER — BRIMONIDINE TARTRATE 2 MG/ML
1 SOLUTION/ DROPS OPHTHALMIC 3 TIMES DAILY
Status: DISCONTINUED | OUTPATIENT
Start: 2024-03-10 | End: 2024-04-04 | Stop reason: HOSPADM

## 2024-03-10 RX ORDER — AMOXICILLIN 250 MG
2 CAPSULE ORAL 2 TIMES DAILY
Status: DISCONTINUED | OUTPATIENT
Start: 2024-03-10 | End: 2024-03-11

## 2024-03-10 RX ORDER — SODIUM CHLORIDE 0.9 % (FLUSH) 0.9 %
10 SYRINGE (ML) INJECTION AS NEEDED
Status: DISCONTINUED | OUTPATIENT
Start: 2024-03-10 | End: 2024-03-11

## 2024-03-10 RX ADMIN — ALBUTEROL SULFATE 10 MG: 2.5 SOLUTION RESPIRATORY (INHALATION) at 19:43

## 2024-03-10 RX ADMIN — INSULIN HUMAN 10 UNITS: 100 INJECTION, SOLUTION PARENTERAL at 15:04

## 2024-03-10 RX ADMIN — INSULIN HUMAN 10 UNITS: 100 INJECTION, SOLUTION PARENTERAL at 10:49

## 2024-03-10 RX ADMIN — SODIUM CHLORIDE 100 ML/HR: 9 INJECTION, SOLUTION INTRAVENOUS at 23:07

## 2024-03-10 RX ADMIN — DEXTROSE MONOHYDRATE 25 G: 25 INJECTION, SOLUTION INTRAVENOUS at 20:11

## 2024-03-10 RX ADMIN — SODIUM CHLORIDE 250 MG: 9 INJECTION, SOLUTION INTRAVENOUS at 23:06

## 2024-03-10 RX ADMIN — DEXTROSE MONOHYDRATE 25 G: 25 INJECTION, SOLUTION INTRAVENOUS at 10:46

## 2024-03-10 RX ADMIN — DEXTROSE MONOHYDRATE 25 G: 25 INJECTION, SOLUTION INTRAVENOUS at 18:05

## 2024-03-10 RX ADMIN — DEXTROSE MONOHYDRATE 25 G: 25 INJECTION, SOLUTION INTRAVENOUS at 22:15

## 2024-03-10 RX ADMIN — INSULIN HUMAN 10 UNITS: 100 INJECTION, SOLUTION PARENTERAL at 20:08

## 2024-03-10 RX ADMIN — CEFEPIME 2000 MG: 2 INJECTION, POWDER, FOR SOLUTION INTRAVENOUS at 23:05

## 2024-03-10 RX ADMIN — TIMOLOL MALEATE 1 DROP: 5 SOLUTION/ DROPS OPHTHALMIC at 20:11

## 2024-03-10 RX ADMIN — SODIUM CHLORIDE 100 ML/HR: 9 INJECTION, SOLUTION INTRAVENOUS at 13:42

## 2024-03-10 RX ADMIN — ALBUTEROL SULFATE 10 MG: 2.5 SOLUTION RESPIRATORY (INHALATION) at 11:01

## 2024-03-10 RX ADMIN — CALCIUM GLUCONATE 1000 MG: 20 INJECTION, SOLUTION INTRAVENOUS at 10:51

## 2024-03-10 RX ADMIN — PROCHLORPERAZINE EDISYLATE 5 MG: 5 INJECTION INTRAMUSCULAR; INTRAVENOUS at 18:19

## 2024-03-10 RX ADMIN — Medication 100 MG: at 23:02

## 2024-03-10 RX ADMIN — CALCIUM GLUCONATE 1000 MG: 20 INJECTION, SOLUTION INTRAVENOUS at 15:04

## 2024-03-10 RX ADMIN — SODIUM BICARBONATE 50 MEQ: 84 INJECTION INTRAVENOUS at 15:04

## 2024-03-10 RX ADMIN — SODIUM BICARBONATE 50 MEQ: 84 INJECTION INTRAVENOUS at 20:08

## 2024-03-10 RX ADMIN — PROPOFOL 5 MCG/KG/MIN: 10 INJECTION, EMULSION INTRAVENOUS at 21:42

## 2024-03-10 RX ADMIN — ALBUTEROL SULFATE 10 MG: 2.5 SOLUTION RESPIRATORY (INHALATION) at 15:34

## 2024-03-10 RX ADMIN — Medication 10 ML: at 13:45

## 2024-03-10 RX ADMIN — BRIMONIDINE TARTRATE 1 DROP: 2 SOLUTION/ DROPS OPHTHALMIC at 23:04

## 2024-03-10 RX ADMIN — BRIMONIDINE TARTRATE 1 DROP: 2 SOLUTION/ DROPS OPHTHALMIC at 15:21

## 2024-03-10 RX ADMIN — AZITHROMYCIN 500 MG: 500 INJECTION, POWDER, LYOPHILIZED, FOR SOLUTION INTRAVENOUS at 13:43

## 2024-03-10 RX ADMIN — PIPERACILLIN AND TAZOBACTAM 3.38 G: 3; .375 INJECTION, POWDER, LYOPHILIZED, FOR SOLUTION INTRAVENOUS at 10:34

## 2024-03-10 RX ADMIN — HEPARIN SODIUM 5000 UNITS: 5000 INJECTION INTRAVENOUS; SUBCUTANEOUS at 13:44

## 2024-03-10 RX ADMIN — CALCIUM GLUCONATE 1000 MG: 20 INJECTION, SOLUTION INTRAVENOUS at 20:12

## 2024-03-10 RX ADMIN — ONDANSETRON 4 MG: 2 INJECTION INTRAMUSCULAR; INTRAVENOUS at 16:27

## 2024-03-10 RX ADMIN — IOPAMIDOL 83 ML: 755 INJECTION, SOLUTION INTRAVENOUS at 10:21

## 2024-03-10 RX ADMIN — Medication 1750 MG: at 17:09

## 2024-03-10 RX ADMIN — CEFEPIME 2000 MG: 2 INJECTION, POWDER, FOR SOLUTION INTRAVENOUS at 13:43

## 2024-03-10 RX ADMIN — DEXTROSE MONOHYDRATE 25 G: 25 INJECTION, SOLUTION INTRAVENOUS at 15:20

## 2024-03-10 RX ADMIN — SODIUM CHLORIDE 250 MG: 9 INJECTION, SOLUTION INTRAVENOUS at 15:17

## 2024-03-10 RX ADMIN — Medication 10 ML: at 20:13

## 2024-03-10 RX ADMIN — HEPARIN SODIUM 5000 UNITS: 5000 INJECTION INTRAVENOUS; SUBCUTANEOUS at 23:05

## 2024-03-10 NOTE — ED NOTES
Omkar Nava    Nursing Report ED to Floor:  Mental status: a/o x1  Ambulatory status: non ambulatory  Oxygen Therapy:  high flow nasal canula  Cardiac Rhythm: bradycardic  Admitted from: er  Safety Concerns:  fall risk  Social Issues: unknown  ED Room #:  16    ED Nurse Phone Extension - [phone] or may call [phone].      HPI:   Chief Complaint   Patient presents with    Aspiration       Past Medical History:  Past Medical History:   Diagnosis Date    Anemia     Dementia     Diabetes mellitus     Dysphagia     GERD (gastroesophageal reflux disease)     History of alcohol abuse     History of cocaine use     History of marijuana use     Hypertension     Osteomyelitis     Poor historian     records obtained from nursing home records & his family    Visual impairment         Past Surgical History:  Past Surgical History:   Procedure Laterality Date    AMPUTATION FOOT Left 10/18/2022    Procedure: PARTIAL FIRST RAY AMPUTATION LEFT;  Surgeon: Yeison Petty MD;  Location:  [facility];  Service: Orthopedics;  Laterality: Left;    AMPUTATION FOOT Left 12/5/2022    Procedure: Transmetatarsal of Left Foot;  Surgeon: Yeison Petty MD;  Location:  [facility];  Service: Orthopedics;  Laterality: Left;    EYE SURGERY          Admitting Doctor:   Kurtis Cedeño MD    Consulting Provider(s):  Consults       Date and Time Order Name Status Description    2/6/2024  1:45 PM Inpatient Cardiology Consult Completed     2/5/2024  3:21 PM Inpatient Neurology Consult General Completed              Admitting Diagnosis:   The primary encounter diagnosis was Acute respiratory failure with hypoxia. Diagnoses of History of dementia, Pneumonia due to infectious organism, unspecified laterality, unspecified part of lung, Hypothermia, initial encounter, Anemia, unspecified type, and Hyperkalemia were also pertinent to this visit.    Most Recent Vitals:   Vitals:    03/10/24 0943 03/10/24 1002 03/10/24 1006 03/10/24 1032   BP:  (!) 148/131   123/54   BP Location:       Patient Position:       Pulse: (!) 36 (!) 34  (!) 32   Resp:       Temp:   (!) 87.6 °F (30.9 °C)    TempSrc:   Rectal    SpO2: 98% 98%  100%   Weight:       Height:           Active LDAs/IV Access:   Lines, Drains & Airways       Active LDAs       Name Placement date Placement time Site Days    Peripheral IV 03/10/24 0906 Left Antecubital 03/10/24  0906  Antecubital  less than 1    Peripheral IV 03/10/24 0940 Anterior;Right;Upper Arm 03/10/24  0940  Arm  less than 1    Urethral Catheter Temperature probe 16 Fr. 03/10/24  1045  -- less than 1    External Urinary Catheter 02/08/24  --  --  31                    Labs (abnormal labs have a star):   Labs Reviewed   COMPREHENSIVE METABOLIC PANEL - Abnormal; Notable for the following components:       Result Value    Glucose 147 (*)     BUN 63 (*)     Creatinine 1.78 (*)     Potassium 7.4 (*)     Chloride 111 (*)     ALT (SGPT) 46 (*)     BUN/Creatinine Ratio 35.4 (*)     eGFR 41.6 (*)     All other components within normal limits    Narrative:     GFR Normal >60  Chronic Kidney Disease <60  Kidney Failure <15     SINGLE HSTROPONIN T - Abnormal; Notable for the following components:    HS Troponin T 63 (*)     All other components within normal limits    Narrative:     High Sensitive Troponin T Reference Range:  <14.0 ng/L- Negative Female for AMI  <22.0 ng/L- Negative Male for AMI  >=14 - Abnormal Female indicating possible myocardial injury.  >=22 - Abnormal Male indicating possible myocardial injury.   Clinicians would have to utilize clinical acumen, EKG, Troponin, and serial changes to determine if it is an Acute Myocardial Infarction or myocardial injury due to an underlying chronic condition.        CBC WITH AUTO DIFFERENTIAL - Abnormal; Notable for the following components:    RBC 2.88 (*)     Hemoglobin 8.4 (*)     Hematocrit 27.8 (*)     MCHC 30.2 (*)     RDW-SD 54.5 (*)     Immature Grans % 0.7 (*)     All other components within  normal limits   BLOOD GAS, ARTERIAL W/CO-OXIMETRY - Abnormal; Notable for the following components:    pH, Arterial 7.217 (*)     pCO2, Arterial 56.4 (*)     pO2, Arterial 64.2 (*)     Base Excess, Arterial -4.8 (*)     Hemoglobin, Blood Gas 8.3 (*)     Hematocrit, Blood Gas 25.4 (*)     Oxyhemoglobin 86.1 (*)     pCO2, Temperature Corrected 56.4 (*)     pO2, Temperature Corrected 64.2 (*)     All other components within normal limits   POCT CREATININE - Abnormal; Notable for the following components:    Creatinine 2.10 (*)     All other components within normal limits   COVID-19 AND FLU A/B, NP SWAB IN TRANSPORT MEDIA 1 HR TAT - Normal    Narrative:     Fact sheet for providers: https://www.fda.gov/media/954432/download    Fact sheet for patients: https://www.fda.gov/media/221408/download    Test performed by PCR.   BNP (IN-HOUSE) - Normal    Narrative:     This assay is used as an aid in the diagnosis of individuals suspected of having heart failure. It can be used as an aid in the diagnosis of acute decompensated heart failure (ADHF) in patients presenting with signs and symptoms of ADHF to the emergency department (ED). In addition, NT-proBNP of <300 pg/mL indicates ADHF is not likely.    Age Range Result Interpretation  NT-proBNP Concentration (pg/mL:      <50             Positive            >450                   Gray                 300-450                    Negative             <300    50-75           Positive            >900                  Gray                300-900                  Negative            <300      >75             Positive            >1800                  Gray                300-1800                  Negative            <300   LACTIC ACID, PLASMA - Normal   PROCALCITONIN - Normal    Narrative:     As a Marker for Sepsis (Non-Neonates):    1. <0.5 ng/mL represents a low risk of severe sepsis and/or septic shock.  2. >2 ng/mL represents a high risk of severe sepsis and/or septic  "shock.    As a Marker for Lower Respiratory Tract Infections that require antibiotic therapy:    PCT on Admission    Antibiotic Therapy       6-12 Hrs later    >0.5                Strongly Recommended  >0.25 - <0.5        Recommended   0.1 - 0.25          Discouraged              Remeasure/reassess PCT  <0.1                Strongly Discouraged     Remeasure/reassess PCT    As 28 day mortality risk marker: \"Change in Procalcitonin Result\" (>80% or <=80%) if Day 0 (or Day 1) and Day 4 values are available. Refer to http://www.[employer] [facility]Ch[facility]-pct-calculator.com    Change in PCT <=80%  A decrease of PCT levels below or equal to 80% defines a positive change in PCT test result representing a higher risk for 28-day all-cause mortality of patients diagnosed with severe sepsis for septic shock.    Change in PCT >80%  A decrease of PCT levels of more than 80% defines a negative change in PCT result representing a lower risk for 28-day all-cause mortality of patients diagnosed with severe sepsis or septic shock.      BLOOD CULTURE   BLOOD CULTURE   BLOOD GAS, ARTERIAL   RAINBOW DRAW    Narrative:     The following orders were created for panel order [place] Draw.  Procedure                               Abnormality         Status                     ---------                               -----------         ------                     Green Top (Gel)[[id number]]                                  Final result               Lavender Top[[id number]]                                     Final result               Gold Top - SST[[id number]]                                   Final result               [place] Top[[id number]]                                         In process                 Light Blue Top[[id number]]                                   Final result                 Please view results for these tests on the individual orders.   HIGH SENSITIVITIY TROPONIN T 2HR   BASIC METABOLIC PANEL   POCT GLUCOSE FINGERSTICK   POCT GLUCOSE FINGERSTICK "   POCT GLUCOSE FINGERSTICK   POCT GLUCOSE FINGERSTICK   CBC AND DIFFERENTIAL    Narrative:     The following orders were created for panel order CBC & Differential.  Procedure                               Abnormality         Status                     ---------                               -----------         ------                     CBC Auto Differential[[id number]]        Abnormal            Final result                 Please view results for these tests on the individual orders.   GREEN TOP   LAVENDER TOP   GOLD TOP - SST   LIGHT BLUE TOP   GRAY TOP       Meds Given in ED:   Medications   sodium chloride 0.9 % flush 10 mL (has no administration in time range)   calcium gluconate 1000 Mg/50ml 0.675% NaCl IV SOLN (1,000 mg Intravenous New Bag 3/10/24 1051)   piperacillin-tazobactam (ZOSYN) 3.375 g IVPB in 100 mL NS MBP (CD) (3.375 g Intravenous New Bag 3/10/24 1034)   iopamidol (ISOVUE-370) 76 % injection 100 mL (83 mL Intravenous Given 3/10/24 1021)   insulin regular (humuLIN R,novoLIN R) injection 10 Units (10 Units Intravenous Given 3/10/24 1049)   dextrose (D50W) (25 g/50 mL) IV injection 25 g (25 g Intravenous Given 3/10/24 1046)   albuterol (PROVENTIL) nebulizer solution 0.083% 2.5 mg/3mL (10 mg Nebulization Given 3/10/24 1101)

## 2024-03-10 NOTE — LETTER
EMS Transport Request  For use at [facility], [place], [place], [place], and [place] only   Patient Name: Omkar Nava : 1955   Weight:92.1 kg (203 lb 0.7 oz) Pick-up Location: [facility] BLS/ALS: BLS/ALS: BLS   Insurance: MEDICARE Auth End Date:    Pre-Cert #: D/C Summary complete:    Destination: Other [facility]   Contact Precautions: Other Contact ESBL   Equipment (O2, Fluids, etc.): None   Arrive By Date/Time: 4/3/24 Stretcher/WC: Stretcher   CM Requesting: Daisy Kelly RN Ext: [phone]   Notes/Medical Necessity: Max assist, fall risk, non ambulatory     ______________________________________________________________________    *Only 2 patient bags OR 1 carry-on size bag are permitted.  Wheelchairs and walkers CANNOT transported with the patient. Acknowledge: Yes

## 2024-03-10 NOTE — H&P
"      Chief complaint: Shortness of breath and choking    Subjective     68-year-old male with a past medical history significant for heart failure with reduced ejection fraction though last EF was normal in January 2024, hypertension, type 2 diabetes mellitus, CKD 3, chronic anemia, osteomyelitis status post transmetatarsal amputation, and dementia among others.  He is a resident of [facility].    He was recently hospitalized here from 2/5 through 2/13 with what appeared to be volume overload with pulmonary edema.  He was also treated for dementia with psychosis, acute on chronic kidney disease, and dysphagia.  He underwent a modified barium swallow that showed moderate oral and mild pharyngeal dysphagia and he was continued on a modified diet on discharge.  He was placed on Abilify and Seroquel and he was continued on scheduled diuretics.    He was brought to the emergency room by EMS this morning.  He reportedly \"choked on his food\" this morning with breakfast.  Staff performed a Heimlich maneuver.  They subsequently noted decreased LOC and cyanotic.  He actually had brief CPR but on EMS arrival had a pulse and was bradycardic.  He was placed on oxygen and brought to our facility.  Workup in the ER revealed some acute on chronic elevation of his creatinine, hyperkalemia, minimal elevation of troponin, mild acute respiratory acidosis and stable anemia.  CT imaging revealed bilateral peripheral patchy infiltrates and basilar infiltrates.      History  Past Medical History:   Diagnosis Date    Anemia     Dementia     Diabetes mellitus     Dysphagia     GERD (gastroesophageal reflux disease)     History of alcohol abuse     History of cocaine use     History of marijuana use     Hypertension     Osteomyelitis     Poor historian     records obtained from nursing home records & his family    Visual impairment      Past Surgical History:   Procedure Laterality Date    AMPUTATION FOOT Left 10/18/2022 " "   Procedure: PARTIAL FIRST RAY AMPUTATION LEFT;  Surgeon: Yeison Petty MD;  Location:  [facility];  Service: Orthopedics;  Laterality: Left;    AMPUTATION FOOT Left 2022    Procedure: Transmetatarsal of Left Foot;  Surgeon: Yeison Petty MD;  Location:  [facility];  Service: Orthopedics;  Laterality: Left;    EYE SURGERY       Family History   Problem Relation Age of Onset    Diabetes Mother     Hypertension Mother     Hypertension Father     Diabetes Father     Diabetes Sister     Hypertension Brother     Diabetes Brother     Diabetes Maternal Grandmother     Diabetes Maternal Grandfather     Hypertension Brother     Diabetes Brother      Social History     Tobacco Use    Smoking status: Former     Current packs/day: 0.00     Average packs/day: 1 pack/day for 40.0 years (40.0 ttl pk-yrs)     Types: Cigarettes     Start date: 1977     Quit date: 2017     Years since quittin.8    Smokeless tobacco: Never   Vaping Use    Vaping status: Never Used   Substance Use Topics    Alcohol use: Not Currently     Comment: histgry of alcohol abuse    Drug use: Yes     Types: Marijuana, \"Crack\" cocaine     Comment: PATIENT STATES \"IT'S BEEN A FEW DAYS\"       Review of Systems   Review of systems could not be obtained due to   patient confusion.      Objective     Vital Signs  Temp:  [87.6 °F (30.9 °C)] 87.6 °F (30.9 °C)  Heart Rate:  [32-38] 38  Resp:  [26-28] 26  BP: (111-148)/() 111/83    Physical Exam:    Objective:  General Appearance:  Ill-appearing.    Vital signs: (most recent): Blood pressure 111/83, pulse (!) 38, temperature (!) 87.6 °F (30.9 °C), temperature source Rectal, resp. rate 26, height 151.9 cm (59.8\"), weight 84.8 kg (187 lb), SpO2 93%.    HEENT: (High flow nasal cannula)    Lungs:  There are rales, decreased breath sounds and rhonchi.  No wheezes.    Heart: Bradycardia.  Regular rhythm.  S1 normal and S2 normal.  No murmur, gallop or friction rub.   Chest: Symmetric chest wall expansion. "   Abdomen: Abdomen is soft and non-distended.  Bowel sounds are normal.   There is no abdominal tenderness.   There is no mass. There is no splenomegaly. There is no hepatomegaly.   Extremities: There is no deformity or dependent edema.  (Shin wounds and excoriations but no purulence or cellulitis)  Neurological: (Alternates between drowsiness and agitation).    Pupils:  Pupils are equal, round, and reactive to light.    Skin:  Warm and dry.                Results Review:    I reviewed the patient's new clinical results.  I reviewed the patient's new imaging results and agree with the interpretation.  I reviewed the patient's other test results and agree with the interpretation    Assessment & Plan     Assessment:    Active Hospital Problems    Diagnosis     **Healthcare-associated pneumonia     Acute respiratory failure with hypoxia     Dysphagia     Acute kidney injury superimposed on chronic kidney disease     Hyperkalemia     Bradycardia     Hypothermia     AMS (altered mental status)     Dementia     Neurocognitive disorder     Essential hypertension     Type 2 diabetes mellitus with diabetic chronic kidney disease        68-year-old male with a past medical history significant for HFrEF though last EF normal 1/2024, hypertension, type 2 diabetes mellitus, CKD 3, chronic anemia, osteomyelitis, and dementia.  He is a nursing home resident.  He was recently admitted here with what was thought to be a CHF exacerbation but was also treated for dementia with psychosis, acute on chronic kidney disease, and dysphagia.  A modified barium swallow revealed moderate oral and mild pharyngeal dysphagia at that time.    He was brought from his nursing home today after he apparently choked on breakfast.  He actually required Heimlich but remained cyanotic and altered and EMS was called.  He has been bradycardic but normotensive currently.  Laboratory evaluations reveal some acute on chronic elevation of his creatinine,  hyperkalemia, minimal elevation of troponin, mild acute respiratory acidosis, and stable anemia.  CT imaging reveals patchy peripheral infiltrates that are not suggestive of simple pulmonary edema or aspiration.    I suspect he may have a pneumonia that is not simply aspiration.  He is certainly at risk for drug-resistant organisms due to his nursing home residence and recent hospital confinement.  He should also be treated for atypical organisms given the patchy peripheral nature of the infiltrates.    My initial plan is to admit him to the ICU and aggressively fluid resuscitate him and manage his hyperkalemia.  Also placement broad-spectrum antibiotics consisting of vancomycin, cefepime, and azithromycin.  I will hold him n.p.o. and have the dysphagia team reevaluate him.    Plan:    ICU admission  Pancultures  Cefepime, vancomycin, azithromycin  Respiratory panel  Normal saline fluid resuscitation  Serial potassium and GFR with volume resuscitation  Administer Depakote IV due to his history of possible seizures  Other home medications can be resumed when he is cleared to take oral medication  Bear hugger to rewarm  Monitor rhythm with holding of beta-blocker and correcting electrolytes  Chemical DVT prophylaxis  Discussed with family in attendance which is his sister.  Daughter will be here later today historically has been full code.      I discussed the patients findings and my recommendations with family, nursing staff, and Dr. Seth .     Level of Risk High due to: decision regarding hospitalization or escalation of level of hospital care and illness with threat to life or bodily function    Kurtis Cedeño MD  Pulmonary and Critical Care Medicine  03/10/24  13:26 EDT

## 2024-03-10 NOTE — PROGRESS NOTES
"Pharmacy Consult-Vancomycin Dosing  Omkar Nava is a  68 y.o. male receiving vancomycin therapy.     Indication: pneumonia  Consulting Provider: Intensivist  ID Consult: no    Goal AUC: 400 - 600 mg/L*hr    Current Antimicrobial Therapy  Anti-Infectives (From admission, onward)      Ordered     Dose/Rate Route Frequency Start Stop    03/10/24 1254  cefepime 2000 mg IVPB in 100 mL NS (MBP)        Ordering Provider: Kurtis Cedeño MD    2,000 mg  over 4 Hours Intravenous Every 8 Hours 03/10/24 2200 03/15/24 2159    03/10/24 1254  azithromycin (ZITHROMAX) 500 mg in sodium chloride 0.9 % 250 mL IVPB-VTB        Ordering Provider: Kurtis Cedeño MD    500 mg  over 60 Minutes Intravenous Daily 03/10/24 1400 03/13/24 0859    03/10/24 1254  cefepime 2000 mg IVPB in 100 mL NS (MBP)        Ordering Provider: Kurtis Cedeño MD    2,000 mg  over 30 Minutes Intravenous Once 03/10/24 1400      03/10/24 1254  Pharmacy to dose vancomycin        Ordering Provider: Kurtis Cedeño MD     Does not apply Continuous PRN 03/10/24 1253 03/15/24 1252    03/10/24 0934  piperacillin-tazobactam (ZOSYN) 3.375 g IVPB in 100 mL NS MBP (CD)        Ordering Provider: Omkar Seth MD    3.375 g Intravenous Once 03/10/24 0950 03/10/24 1109            Allergies  Allergies as of 03/10/2024    (No Known Allergies)       Labs    Results from last 7 days   Lab Units 03/10/24  1241 03/10/24  0944 03/10/24  0920   BUN mg/dL 62* 63*  --    CREATININE mg/dL 1.93* 1.78* 2.10*       Results from last 7 days   Lab Units 03/10/24  0912   WBC 10*3/mm3 4.43       Evaluation of Dosing     Last Dose Received in the ED/Outside Facility: No  Is Patient on Dialysis or Renal Replacement: No    Ht - 151.9 cm (59.8\")  Wt - 84.8 kg (187 lb)    Estimated Creatinine Clearance: 35 mL/min (A) (by C-G formula based on SCr of 1.93 mg/dL (H)).    No intake/output data recorded.    Microbiology and Radiology  Microbiology Results " (last 10 days)       Procedure Component Value - Date/Time    COVID-19 and FLU A/B PCR, 1 HR TAT - Swab, Nasopharynx [[id number]]  (Normal) Collected: 03/10/24 0923    Lab Status: Final result Specimen: Swab from Nasopharynx Updated: 03/10/24 1017     COVID19 Not Detected     Influenza A PCR Not Detected     Influenza B PCR Not Detected    Narrative:      Fact sheet for providers: https://www.fda.gov/media/282711/download    Fact sheet for patients: https://www.fda.gov/media/348882/download    Test performed by PCR.            Reported Vancomycin Levels                         [employer] AUC Calculation:    Current AUC:   n/a mg/L*hr    Predicted Steady State AUC on Current Dose: n/a mg/L*hr  _________________________________    Predicted Steady State AUC on New Dose:   n/a mg/L*hr    Assessment/Plan:  1. Pharmacy to dose vancomycin for pneumonia. Goal -600 mg/L*hr.   2. Patient received a loading dose of vancomycin 1750mg (~20mg/kg) IV on 3/10 @ 1430. Will defer initiating a vancomycin maintenance dose at this time due to patients renal function.   3. Assess clearance by vancomycin level on 3/11 with AM labs to determine appropriateness of scheduled dosing.   4. Pharmacy will continue to monitor renal function, cultures and sensitivities, and clinical status to adjust regimen as necessary.    Sarkis Easley RPH  3/10/2024   13:47 EDT

## 2024-03-10 NOTE — ED PROVIDER NOTES
"Subjective   History of Present Illness  68-year-old male presents via EMS from his nursing home to be evaluated for shortness of breath and hypoxia.  Of note, the patient has a history of dementia.  This morning, he was at breakfast at his facility when he \"choked on his food.\"  Staff had to do the Heimlich maneuver and noted decreased LOC.  He became cyanotic so they started performing CPR and called EMS.  On EMS arrival, the patient had a pulse and was noted to be quite tachypneic and bradycardic.  He had room air oxygen saturations of 80%.  He is not typically oxygen dependent.  A nonrebreather was placed and he was brought to our facility to be evaluated.  The patient is unable to provide me any history at this time.      Review of Systems   Unable to perform ROS: Dementia   Respiratory:  Positive for shortness of breath.        Past Medical History:   Diagnosis Date    Anemia     Dementia     Diabetes mellitus     Dysphagia     GERD (gastroesophageal reflux disease)     History of alcohol abuse     History of cocaine use     History of marijuana use     Hypertension     Osteomyelitis     Poor historian     records obtained from nursing home records & his family    Visual impairment        No Known Allergies    Past Surgical History:   Procedure Laterality Date    AMPUTATION FOOT Left 10/18/2022    Procedure: PARTIAL FIRST RAY AMPUTATION LEFT;  Surgeon: Yeison Petty MD;  Location: [facility] OR;  Service: Orthopedics;  Laterality: Left;    AMPUTATION FOOT Left 12/5/2022    Procedure: Transmetatarsal of Left Foot;  Surgeon: Yeison Petty MD;  Location:  [facility] OR;  Service: Orthopedics;  Laterality: Left;    EYE SURGERY         Family History   Problem Relation Age of Onset    Diabetes Mother     Hypertension Mother     Hypertension Father     Diabetes Father     Diabetes Sister     Hypertension Brother     Diabetes Brother     Diabetes Maternal Grandmother     Diabetes Maternal Grandfather     Hypertension " "Brother     Diabetes Brother        Social History     Socioeconomic History    Marital status: Single   Tobacco Use    Smoking status: Former     Current packs/day: 0.00     Average packs/day: 1 pack/day for 40.0 years (40.0 ttl pk-yrs)     Types: Cigarettes     Start date: 1977     Quit date: 2017     Years since quittin.8    Smokeless tobacco: Never   Vaping Use    Vaping status: Never Used   Substance and Sexual Activity    Alcohol use: Not Currently     Comment: histgry of alcohol abuse    Drug use: Yes     Types: Marijuana, \"Crack\" cocaine     Comment: PATIENT STATES \"IT'S BEEN A FEW DAYS\"    Sexual activity: Defer           Objective   Physical Exam  Vitals and nursing note reviewed.   Constitutional:       Appearance: He is well-developed. He is not diaphoretic.      Comments: Chronically ill-appearing elderly male   HENT:      Head: Normocephalic and atraumatic.   Neck:      Vascular: No JVD.   Cardiovascular:      Rate and Rhythm: Normal rate and regular rhythm.      Heart sounds: Normal heart sounds. No murmur heard.     No friction rub. No gallop.   Pulmonary:      Breath sounds: No wheezing or rales.      Comments: Tachypneic with rhonchorous breath sounds noted, no accessory muscle use or retractions, shallow breathing  Abdominal:      General: Bowel sounds are normal. There is no distension.      Palpations: Abdomen is soft. There is no mass.      Tenderness: There is no abdominal tenderness. There is no guarding.   Musculoskeletal:         General: Normal range of motion.      Right lower leg: No edema.      Left lower leg: No edema.   Skin:     General: Skin is warm and dry.      Coloration: Skin is not pale.      Findings: No erythema or rash.   Neurological:      Mental Status: He is alert.      Comments: Decreased LOC, response to painful stimuli, moving extremities   Psychiatric:      Comments: Unable to adequately evaluate         Critical Care    Performed by: Omkar Seth, " "MD  Authorized by: Omkar Seth MD    Critical care provider statement:     Critical care time (minutes):  38    Critical care was necessary to treat or prevent imminent or life-threatening deterioration of the following conditions:  Respiratory failure and metabolic crisis    Critical care was time spent personally by me on the following activities:  Development of treatment plan with patient or surrogate, discussions with consultants, evaluation of patient's response to treatment, examination of patient, obtaining history from patient or surrogate, ordering and performing treatments and interventions, ordering and review of laboratory studies, ordering and review of radiographic studies, pulse oximetry, re-evaluation of patient's condition and review of old charts             ED Course  ED Course as of 03/10/24 1200   Sun Mar 10, 2024   0911 68-year-old male presents via EMS from the nursing home to be evaluated for shortness of breath and hypoxia.  This morning, the patient was at breakfast at his facility when he \"choked on his food.\"  Staff had to do the Heimlich maneuver and noted decreased LOC.  They performed CPR after he had an episode of cyanosis.  EMS was called.  On their arrival, the patient had oxygen saturations of 80% on room air.  He is not typically oxygen dependent.  A nonrebreather was placed and he was brought to our facility to be evaluated.  He is not hypoglycemic.  On arrival, the patient appears chronically ill.  He is altered but is protecting his airway.  He has tachypnea with shallow respirations and rhonchorous breath sounds.  Differential diagnosis is quite broad.  We will obtain labs and imaging, and we will reassess following initial interventions. [DD]   4115 EKG interpreted independently by me revealed sinus bradycardia with a heart rate of 39 and a prolonged SD interval. [DD]   3879 I did a thorough review the patient's records.  He was admitted to our facility from " February 5 through February 13 before being discharged back to his facility at [facility]. [DD]   0934 ABG revealed a pH of 7.2 with a pO2 of 64.  High flow oxygen via nasal cannula initiated. [DD]   0935 The patient is a full code.  We will see admission to the ICU pending return of remainder of workup. [DD]   0943 I personally and independently viewed the patient's x-ray images myself, and I am in agreement with the radiologist's reading for final interpretation, particular regarding multifocal pneumonia. [DD]   0943 Zosyn initiated.  Blood cultures obtained. [DD]   1033 I personally and independently reviewed the patient's CT images and findings, and I am in agreement with the radiologist regarding CT interpretation--particularly there is no pulmonary embolism. [DD]   1033 After reviewing the patient's labs and imaging, I discussed the patient's case with our intensivist, Dr. Mantilla, and the patient will be admitted under his care to the ICU for further evaluation and treatment.  The patient is hemodynamically stable at this time.  His sister at the bedside is aware/agreeable with the plan. [DD]   1034 Bear hugger has been placed for warming. [DD]   1035 Additionally, labs remarkable for hyperkalemia with a potassium of 7.4.  Hyperkalemia treated medically. [DD]   1036 Procalcitonin is negative. [DD]      ED Course User Index  [DD] Omkar Seth MD                                        Recent Results (from the past 24 hour(s))   Lavender Top    Collection Time: 03/10/24  9:12 AM   Result Value Ref Range    Extra Tube hold for add-on    Light Blue Top    Collection Time: 03/10/24  9:12 AM   Result Value Ref Range    Extra Tube Hold for add-ons.    CBC Auto Differential    Collection Time: 03/10/24  9:12 AM    Specimen: Blood   Result Value Ref Range    WBC 4.43 3.40 - 10.80 10*3/mm3    RBC 2.88 (L) 4.14 - 5.80 10*6/mm3    Hemoglobin 8.4 (L) 13.0 - 17.7 g/dL    Hematocrit 27.8 (L) 37.5 - 51.0 %    MCV  96.5 79.0 - 97.0 fL    MCH 29.2 26.6 - 33.0 pg    MCHC 30.2 (L) 31.5 - 35.7 g/dL    RDW 15.4 12.3 - 15.4 %    RDW-SD 54.5 (H) 37.0 - 54.0 fl    MPV 11.6 6.0 - 12.0 fL    Platelets 166 140 - 450 10*3/mm3    Neutrophil % 68.6 42.7 - 76.0 %    Lymphocyte % 21.9 19.6 - 45.3 %    Monocyte % 7.2 5.0 - 12.0 %    Eosinophil % 1.4 0.3 - 6.2 %    Basophil % 0.2 0.0 - 1.5 %    Immature Grans % 0.7 (H) 0.0 - 0.5 %    Neutrophils, Absolute 3.04 1.70 - 7.00 10*3/mm3    Lymphocytes, Absolute 0.97 0.70 - 3.10 10*3/mm3    Monocytes, Absolute 0.32 0.10 - 0.90 10*3/mm3    Eosinophils, Absolute 0.06 0.00 - 0.40 10*3/mm3    Basophils, Absolute 0.01 0.00 - 0.20 10*3/mm3    Immature Grans, Absolute 0.03 0.00 - 0.05 10*3/mm3    nRBC 0.0 0.0 - 0.2 /100 WBC   Lactic Acid, Plasma    Collection Time: 03/10/24  9:12 AM    Specimen: Blood   Result Value Ref Range    Lactate 1.0 0.5 - 2.0 mmol/L   ECG 12 Lead ED Triage Standing Order; SOA    Collection Time: 03/10/24  9:12 AM   Result Value Ref Range    QT Interval 536 ms    QTC Interval 431 ms   POC Creatinine    Collection Time: 03/10/24  9:20 AM    Specimen: Blood   Result Value Ref Range    Creatinine 2.10 (A) 0.60 - 1.30 mg/dL   COVID-19 and FLU A/B PCR, 1 HR TAT - Swab, Nasopharynx    Collection Time: 03/10/24  9:23 AM    Specimen: Nasopharynx; Swab   Result Value Ref Range    COVID19 Not Detected Not Detected - Ref. Range    Influenza A PCR Not Detected Not Detected    Influenza B PCR Not Detected Not Detected   Blood Gas, Arterial With Co-Ox    Collection Time: 03/10/24  9:31 AM    Specimen: Arterial Blood   Result Value Ref Range    Site Left Radial     Golden's Test N/A     pH, Arterial 7.217 (C) 7.350 - 7.450 pH units    pCO2, Arterial 56.4 (H) 35.0 - 45.0 mm Hg    pO2, Arterial 64.2 (L) 83.0 - 108.0 mm Hg    HCO3, Arterial 22.9 20.0 - 26.0 mmol/L    Base Excess, Arterial -4.8 (L) 0.0 - 2.0 mmol/L    Hemoglobin, Blood Gas 8.3 (L) 13.5 - 17.5 g/dL    Hematocrit, Blood Gas 25.4 (L) 38.0 -  51.0 %    Oxyhemoglobin 86.1 (L) 94 - 99 %    Methemoglobin 0.30 0.00 - 1.50 %    Carboxyhemoglobin 1.1 0 - 2 %    CO2 Content 24.6 22 - 33 mmol/L    Temperature 37.0     Barometric Pressure for Blood Gas      Modality Nasal Cannula     FIO2 36 %    Rate 0 Breaths/minute    PIP 0 cmH2O    IPAP 0     EPAP 0     Notified Who NY ALARCON MD     Notified By [id number]     Notified Time 03/10/2024 08:37     pH, Temp Corrected 7.217 pH Units    pCO2, Temperature Corrected 56.4 (H) 35 - 48 mm Hg    pO2, Temperature Corrected 64.2 (L) 83 - 108 mm Hg   Comprehensive Metabolic Panel    Collection Time: 03/10/24  9:44 AM    Specimen: Blood   Result Value Ref Range    Glucose 147 (H) 65 - 99 mg/dL    BUN 63 (H) 8 - 23 mg/dL    Creatinine 1.78 (H) 0.76 - 1.27 mg/dL    Sodium 139 136 - 145 mmol/L    Potassium 7.4 (C) 3.5 - 5.2 mmol/L    Chloride 111 (H) 98 - 107 mmol/L    CO2 22.0 22.0 - 29.0 mmol/L    Calcium 8.8 8.6 - 10.5 mg/dL    Total Protein 7.2 6.0 - 8.5 g/dL    Albumin 3.8 3.5 - 5.2 g/dL    ALT (SGPT) 46 (H) 1 - 41 U/L    AST (SGOT) 32 1 - 40 U/L    Alkaline Phosphatase 89 39 - 117 U/L    Total Bilirubin <0.2 0.0 - 1.2 mg/dL    Globulin 3.4 gm/dL    A/G Ratio 1.1 g/dL    BUN/Creatinine Ratio 35.4 (H) 7.0 - 25.0    Anion Gap 6.0 5.0 - 15.0 mmol/L    eGFR 41.6 (L) >60.0 mL/min/1.73   BNP    Collection Time: 03/10/24  9:44 AM    Specimen: Blood   Result Value Ref Range    proBNP 434.3 0.0 - 900.0 pg/mL   Single High Sensitivity Troponin T    Collection Time: 03/10/24  9:44 AM    Specimen: Blood   Result Value Ref Range    HS Troponin T 63 (C) <22 ng/L   Green Top (Gel)    Collection Time: 03/10/24  9:44 AM   Result Value Ref Range    Extra Tube Hold for add-ons.    Gold Top - SST    Collection Time: 03/10/24  9:44 AM   Result Value Ref Range    Extra Tube Hold for add-ons.    Procalcitonin    Collection Time: 03/10/24  9:44 AM    Specimen: Blood   Result Value Ref Range    Procalcitonin 0.06 0.00 - 0.25 ng/mL     Note: In addition  to lab results from this visit, the labs listed above may include labs taken at another facility or during a different encounter within the last 24 hours. Please correlate lab times with ED admission and discharge times for further clarification of the services performed during this visit.    CT Angiogram Chest   Final Result   1.No evidence for pulmonary embolism.   2.Evidence for ongoing atypical/viral infection versus multifocal pneumonia and potential changes of Covid 19 infection. Recommend correlation for signs or symptoms of acute infection and follow-up to ensure improvement/resolution.            Electronically Signed: Renny Canales MD     3/10/2024 10:28 AM EDT     Workstation ID: [id number]      XR Chest 1 View   Final Result   1.Continued ill-defined multifocal airspace infiltrates bilaterally with diffuse interstitial changes. The findings suggest ongoing atypical/viral infection or multifocal pneumonia versus changes of CHF and pulmonary edema. Recommend correlation for    signs or symptoms of acute infection and follow-up to ensure improvement/resolution.            Electronically Signed: Renny Canales MD     3/10/2024 9:37 AM EDT     Workstation ID: [id number]        Vitals:    03/10/24 1002 03/10/24 1006 03/10/24 1032 03/10/24 1103   BP: (!) 148/131  123/54    BP Location:       Patient Position:       Pulse: (!) 34  (!) 32 (!) 35   Resp:       Temp:  (!) 87.6 °F (30.9 °C)     TempSrc:  Rectal     SpO2: 98%  100% 100%   Weight:       Height:         Medications   sodium chloride 0.9 % flush 10 mL (has no administration in time range)   piperacillin-tazobactam (ZOSYN) 3.375 g IVPB in 100 mL NS MBP (CD) (0 g Intravenous Stopped 3/10/24 1109)   iopamidol (ISOVUE-370) 76 % injection 100 mL (83 mL Intravenous Given 3/10/24 1021)   calcium gluconate 1000 Mg/50ml 0.675% NaCl IV SOLN (1,000 mg Intravenous New Bag 3/10/24 1051)   insulin regular (humuLIN R,novoLIN R) injection 10 Units (10 Units Intravenous  Given 3/10/24 1049)   dextrose (D50W) (25 g/50 mL) IV injection 25 g (25 g Intravenous Given 3/10/24 1046)   albuterol (PROVENTIL) nebulizer solution 0.083% 2.5 mg/3mL (10 mg Nebulization Given 3/10/24 1101)     ECG/EMG Results (last 24 hours)       Procedure Component Value Units Date/Time    ECG 12 Lead ED Triage Standing Order; SOA [[id number]] Collected: 03/10/24 0912     Updated: 03/10/24 0913     QT Interval 536 ms      QTC Interval 431 ms     Narrative:      Test Reason : ED Triage Standing Order~  Blood Pressure :   */*   mmHG  Vent. Rate :  39 BPM     Atrial Rate :  38 BPM     P-R Int :   * ms          QRS Dur :  84 ms      QT Int : 536 ms       P-R-T Axes :   *  73  23 degrees     QTc Int : 431 ms    Junctional bradycardia  Junctional ST depression, probably normal  Abnormal ECG  When compared with ECG of 05-FEB-2024 11:35,  Junctional rhythm has replaced Sinus rhythm  Vent. rate has decreased BY  33 BPM  Criteria for Septal infarct are no longer present  T wave amplitude has increased in Lateral leads    Referred By: EDMD           Confirmed By:           ECG 12 Lead ED Triage Standing Order; SOA   Preliminary Result   Test Reason : ED Triage Standing Order~   Blood Pressure :   */*   mmHG   Vent. Rate :  39 BPM     Atrial Rate :  38 BPM      P-R Int :   * ms          QRS Dur :  84 ms       QT Int : 536 ms       P-R-T Axes :   *  73  23 degrees      QTc Int : 431 ms      Junctional bradycardia   Junctional ST depression, probably normal   Abnormal ECG   When compared with ECG of 05-FEB-2024 11:35,   Junctional rhythm has replaced Sinus rhythm   Vent. rate has decreased BY  33 BPM   Criteria for Septal infarct are no longer present   T wave amplitude has increased in Lateral leads      Referred By: EDMD           Confirmed By:                  Medical Decision Making  Problems Addressed:  Acute respiratory failure with hypoxia: complicated acute illness or injury  Anemia, unspecified type: complicated acute  illness or injury  History of dementia: complicated acute illness or injury  Hyperkalemia: complicated acute illness or injury  Hypothermia, initial encounter: complicated acute illness or injury  Pneumonia due to infectious organism, unspecified laterality, unspecified part of lung: complicated acute illness or injury    Amount and/or Complexity of Data Reviewed  Labs: ordered.  Radiology: ordered.  ECG/medicine tests: ordered.    Risk  OTC drugs.  Prescription drug management.  Decision regarding hospitalization.        Final diagnoses:   Acute respiratory failure with hypoxia   History of dementia   Pneumonia due to infectious organism, unspecified laterality, unspecified part of lung   Hypothermia, initial encounter   Anemia, unspecified type   Hyperkalemia       ED Disposition  ED Disposition       ED Disposition   Intended Admit    Condition   --    Comment   --               No follow-up provider specified.       Medication List      No changes were made to your prescriptions during this visit.            Omkar Seth MD  03/10/24 1200

## 2024-03-10 NOTE — SIGNIFICANT NOTE
03/10/24 1433   SLP Deferred Reason   SLP Deferred Reason Unable to evaluate, medical status change  (RN reports not appropriate, will follow up 3/11)

## 2024-03-11 ENCOUNTER — APPOINTMENT (OUTPATIENT)
Dept: ULTRASOUND IMAGING | Facility: HOSPITAL | Age: 69
End: 2024-03-11
Payer: MEDICARE

## 2024-03-11 ENCOUNTER — APPOINTMENT (OUTPATIENT)
Dept: CT IMAGING | Facility: HOSPITAL | Age: 69
End: 2024-03-11
Payer: MEDICARE

## 2024-03-11 ENCOUNTER — APPOINTMENT (OUTPATIENT)
Dept: GENERAL RADIOLOGY | Facility: HOSPITAL | Age: 69
End: 2024-03-11
Payer: MEDICARE

## 2024-03-11 ENCOUNTER — APPOINTMENT (OUTPATIENT)
Dept: NEUROLOGY | Facility: HOSPITAL | Age: 69
End: 2024-03-11
Payer: MEDICARE

## 2024-03-11 LAB
ALBUMIN SERPL-MCNC: 3.4 G/DL (ref 3.5–5.2)
ALBUMIN/GLOB SERPL: 1.2 G/DL
ALP SERPL-CCNC: 74 U/L (ref 39–117)
ALT SERPL W P-5'-P-CCNC: 46 U/L (ref 1–41)
ANION GAP SERPL CALCULATED.3IONS-SCNC: 8 MMOL/L (ref 5–15)
ANION GAP SERPL CALCULATED.3IONS-SCNC: 8 MMOL/L (ref 5–15)
ANION GAP SERPL CALCULATED.3IONS-SCNC: 9 MMOL/L (ref 5–15)
AST SERPL-CCNC: 28 U/L (ref 1–40)
BASOPHILS # BLD AUTO: 0.01 10*3/MM3 (ref 0–0.2)
BASOPHILS # BLD MANUAL: 0 10*3/MM3 (ref 0–0.2)
BASOPHILS NFR BLD AUTO: 0.2 % (ref 0–1.5)
BASOPHILS NFR BLD MANUAL: 0 % (ref 0–1.5)
BILIRUB SERPL-MCNC: 0.2 MG/DL (ref 0–1.2)
BUN SERPL-MCNC: 57 MG/DL (ref 8–23)
BUN SERPL-MCNC: 63 MG/DL (ref 8–23)
BUN SERPL-MCNC: 64 MG/DL (ref 8–23)
BUN/CREAT SERPL: 27.9 (ref 7–25)
BUN/CREAT SERPL: 28.2 (ref 7–25)
BUN/CREAT SERPL: 30.1 (ref 7–25)
CALCIUM SPEC-SCNC: 8.3 MG/DL (ref 8.6–10.5)
CALCIUM SPEC-SCNC: 8.8 MG/DL (ref 8.6–10.5)
CALCIUM SPEC-SCNC: 8.9 MG/DL (ref 8.6–10.5)
CHLORIDE SERPL-SCNC: 111 MMOL/L (ref 98–107)
CHLORIDE SERPL-SCNC: 115 MMOL/L (ref 98–107)
CHLORIDE SERPL-SCNC: 115 MMOL/L (ref 98–107)
CO2 SERPL-SCNC: 23 MMOL/L (ref 22–29)
CREAT BLDA-MCNC: 2.1 MG/DL (ref 0.6–1.3)
CREAT SERPL-MCNC: 2.02 MG/DL (ref 0.76–1.27)
CREAT SERPL-MCNC: 2.09 MG/DL (ref 0.76–1.27)
CREAT SERPL-MCNC: 2.29 MG/DL (ref 0.76–1.27)
CREAT UR-MCNC: 64.9 MG/DL
DEPRECATED RDW RBC AUTO: 53.1 FL (ref 37–54)
DEPRECATED RDW RBC AUTO: 54.4 FL (ref 37–54)
EGFRCR SERPLBLD CKD-EPI 2021: 30.7 ML/MIN/1.73
EGFRCR SERPLBLD CKD-EPI 2021: 34.3 ML/MIN/1.73
EGFRCR SERPLBLD CKD-EPI 2021: 35.7 ML/MIN/1.73
EOSINOPHIL # BLD AUTO: 0 10*3/MM3 (ref 0–0.4)
EOSINOPHIL # BLD MANUAL: 0 10*3/MM3 (ref 0–0.4)
EOSINOPHIL NFR BLD AUTO: 0 % (ref 0.3–6.2)
EOSINOPHIL NFR BLD MANUAL: 0 % (ref 0.3–6.2)
ERYTHROCYTE [DISTWIDTH] IN BLOOD BY AUTOMATED COUNT: 15.6 % (ref 12.3–15.4)
ERYTHROCYTE [DISTWIDTH] IN BLOOD BY AUTOMATED COUNT: 15.8 % (ref 12.3–15.4)
GLOBULIN UR ELPH-MCNC: 2.8 GM/DL
GLUCOSE BLDC GLUCOMTR-MCNC: 100 MG/DL (ref 70–130)
GLUCOSE BLDC GLUCOMTR-MCNC: 105 MG/DL (ref 70–130)
GLUCOSE BLDC GLUCOMTR-MCNC: 106 MG/DL (ref 70–130)
GLUCOSE BLDC GLUCOMTR-MCNC: 109 MG/DL (ref 70–130)
GLUCOSE BLDC GLUCOMTR-MCNC: 110 MG/DL (ref 70–130)
GLUCOSE BLDC GLUCOMTR-MCNC: 113 MG/DL (ref 70–130)
GLUCOSE BLDC GLUCOMTR-MCNC: 117 MG/DL (ref 70–130)
GLUCOSE BLDC GLUCOMTR-MCNC: 140 MG/DL (ref 70–130)
GLUCOSE BLDC GLUCOMTR-MCNC: 141 MG/DL (ref 70–130)
GLUCOSE BLDC GLUCOMTR-MCNC: 275 MG/DL (ref 70–130)
GLUCOSE BLDC GLUCOMTR-MCNC: 52 MG/DL (ref 70–130)
GLUCOSE BLDC GLUCOMTR-MCNC: 88 MG/DL (ref 70–130)
GLUCOSE BLDC GLUCOMTR-MCNC: 95 MG/DL (ref 70–130)
GLUCOSE SERPL-MCNC: 103 MG/DL (ref 65–99)
GLUCOSE SERPL-MCNC: 80 MG/DL (ref 65–99)
GLUCOSE SERPL-MCNC: 95 MG/DL (ref 65–99)
HCT VFR BLD AUTO: 23 % (ref 37.5–51)
HCT VFR BLD AUTO: 23.5 % (ref 37.5–51)
HCT VFR BLD AUTO: 24.5 % (ref 37.5–51)
HCT VFR BLD AUTO: 25.3 % (ref 37.5–51)
HGB BLD-MCNC: 7.1 G/DL (ref 13–17.7)
HGB BLD-MCNC: 7.2 G/DL (ref 13–17.7)
HGB BLD-MCNC: 7.5 G/DL (ref 13–17.7)
HGB BLD-MCNC: 8 G/DL (ref 13–17.7)
IMM GRANULOCYTES # BLD AUTO: 0.01 10*3/MM3 (ref 0–0.05)
IMM GRANULOCYTES NFR BLD AUTO: 0.2 % (ref 0–0.5)
LYMPHOCYTES # BLD AUTO: 0.6 10*3/MM3 (ref 0.7–3.1)
LYMPHOCYTES # BLD MANUAL: 0.8 10*3/MM3 (ref 0.7–3.1)
LYMPHOCYTES NFR BLD AUTO: 12.1 % (ref 19.6–45.3)
LYMPHOCYTES NFR BLD MANUAL: 1 % (ref 5–12)
MAGNESIUM SERPL-MCNC: 2.4 MG/DL (ref 1.6–2.4)
MCH RBC QN AUTO: 28.6 PG (ref 26.6–33)
MCH RBC QN AUTO: 28.8 PG (ref 26.6–33)
MCHC RBC AUTO-ENTMCNC: 30.6 G/DL (ref 31.5–35.7)
MCHC RBC AUTO-ENTMCNC: 30.6 G/DL (ref 31.5–35.7)
MCV RBC AUTO: 93.3 FL (ref 79–97)
MCV RBC AUTO: 94.2 FL (ref 79–97)
MICROCYTES BLD QL: ABNORMAL
MONOCYTES # BLD AUTO: 0.34 10*3/MM3 (ref 0.1–0.9)
MONOCYTES # BLD: 0.06 10*3/MM3 (ref 0.1–0.9)
MONOCYTES NFR BLD AUTO: 6.9 % (ref 5–12)
NEUTROPHILS # BLD AUTO: 5.32 10*3/MM3 (ref 1.7–7)
NEUTROPHILS NFR BLD AUTO: 3.99 10*3/MM3 (ref 1.7–7)
NEUTROPHILS NFR BLD AUTO: 80.6 % (ref 42.7–76)
NEUTROPHILS NFR BLD MANUAL: 83 % (ref 42.7–76)
NEUTS BAND NFR BLD MANUAL: 3 % (ref 0–5)
NRBC BLD AUTO-RTO: 0 /100 WBC (ref 0–0.2)
PHOSPHATE SERPL-MCNC: 3.4 MG/DL (ref 2.5–4.5)
PLAT MORPH BLD: NORMAL
PLATELET # BLD AUTO: 146 10*3/MM3 (ref 140–450)
PLATELET # BLD AUTO: 154 10*3/MM3 (ref 140–450)
PMV BLD AUTO: 11 FL (ref 6–12)
PMV BLD AUTO: 11.4 FL (ref 6–12)
POTASSIUM SERPL-SCNC: 5.7 MMOL/L (ref 3.5–5.2)
POTASSIUM SERPL-SCNC: 5.7 MMOL/L (ref 3.5–5.2)
POTASSIUM SERPL-SCNC: 6 MMOL/L (ref 3.5–5.2)
POTASSIUM SERPL-SCNC: 6.8 MMOL/L (ref 3.5–5.2)
PROT ?TM UR-MCNC: 27.4 MG/DL
PROT SERPL-MCNC: 6.2 G/DL (ref 6–8.5)
RBC # BLD AUTO: 2.52 10*6/MM3 (ref 4.14–5.8)
RBC # BLD AUTO: 2.6 10*6/MM3 (ref 4.14–5.8)
RBC MORPH BLD: NORMAL
SMALL PLATELETS BLD QL SMEAR: ABNORMAL
SODIUM SERPL-SCNC: 143 MMOL/L (ref 136–145)
SODIUM SERPL-SCNC: 146 MMOL/L (ref 136–145)
SODIUM SERPL-SCNC: 146 MMOL/L (ref 136–145)
SODIUM UR-SCNC: 56 MMOL/L
URINE MYOGLOBIN, QUALITATIVE: ABNORMAL
VANCOMYCIN SERPL-MCNC: 14.9 MCG/ML (ref 5–40)
VARIANT LYMPHS NFR BLD MANUAL: 13 % (ref 19.6–45.3)
WBC MORPH BLD: NORMAL
WBC MORPH BLD: NORMAL
WBC NRBC COR # BLD AUTO: 4.95 10*3/MM3 (ref 3.4–10.8)
WBC NRBC COR # BLD AUTO: 6.19 10*3/MM3 (ref 3.4–10.8)

## 2024-03-11 PROCEDURE — 84132 ASSAY OF SERUM POTASSIUM: CPT

## 2024-03-11 PROCEDURE — 85007 BL SMEAR W/DIFF WBC COUNT: CPT

## 2024-03-11 PROCEDURE — 74176 CT ABD & PELVIS W/O CONTRAST: CPT

## 2024-03-11 PROCEDURE — 82805 BLOOD GASES W/O2 SATURATION: CPT

## 2024-03-11 PROCEDURE — 80202 ASSAY OF VANCOMYCIN: CPT

## 2024-03-11 PROCEDURE — 25010000002 FENTANYL CITRATE (PF) 2500 MCG/50ML SOLUTION: Performed by: INTERNAL MEDICINE

## 2024-03-11 PROCEDURE — 85018 HEMOGLOBIN: CPT | Performed by: INTERNAL MEDICINE

## 2024-03-11 PROCEDURE — 84156 ASSAY OF PROTEIN URINE: CPT | Performed by: INTERNAL MEDICINE

## 2024-03-11 PROCEDURE — 25010000002 LABETALOL 5 MG/ML SOLUTION: Performed by: NURSE PRACTITIONER

## 2024-03-11 PROCEDURE — 84100 ASSAY OF PHOSPHORUS: CPT | Performed by: INTERNAL MEDICINE

## 2024-03-11 PROCEDURE — 25010000002 CEFEPIME PER 500 MG: Performed by: INTERNAL MEDICINE

## 2024-03-11 PROCEDURE — 25810000003 SODIUM CHLORIDE 0.9 % SOLUTION

## 2024-03-11 PROCEDURE — 94799 UNLISTED PULMONARY SVC/PX: CPT

## 2024-03-11 PROCEDURE — 71045 X-RAY EXAM CHEST 1 VIEW: CPT

## 2024-03-11 PROCEDURE — 83050 HGB METHEMOGLOBIN QUAN: CPT

## 2024-03-11 PROCEDURE — 85007 BL SMEAR W/DIFF WBC COUNT: CPT | Performed by: INTERNAL MEDICINE

## 2024-03-11 PROCEDURE — 25010000002 FUROSEMIDE PER 20 MG

## 2024-03-11 PROCEDURE — 87205 SMEAR GRAM STAIN: CPT | Performed by: INTERNAL MEDICINE

## 2024-03-11 PROCEDURE — 76775 US EXAM ABDO BACK WALL LIM: CPT

## 2024-03-11 PROCEDURE — 85027 COMPLETE CBC AUTOMATED: CPT | Performed by: INTERNAL MEDICINE

## 2024-03-11 PROCEDURE — 25010000002 PROPOFOL 10 MG/ML EMULSION: Performed by: INTERNAL MEDICINE

## 2024-03-11 PROCEDURE — 87070 CULTURE OTHR SPECIMN AEROBIC: CPT | Performed by: INTERNAL MEDICINE

## 2024-03-11 PROCEDURE — 85025 COMPLETE CBC W/AUTO DIFF WBC: CPT

## 2024-03-11 PROCEDURE — 95819 EEG AWAKE AND ASLEEP: CPT | Performed by: PSYCHIATRY & NEUROLOGY

## 2024-03-11 PROCEDURE — 94761 N-INVAS EAR/PLS OXIMETRY MLT: CPT

## 2024-03-11 PROCEDURE — 25010000002 HEPARIN (PORCINE) PER 1000 UNITS: Performed by: INTERNAL MEDICINE

## 2024-03-11 PROCEDURE — 25810000003 SODIUM CHLORIDE 0.9 % SOLUTION 250 ML FLEX CONT: Performed by: INTERNAL MEDICINE

## 2024-03-11 PROCEDURE — 25010000002 CALCIUM GLUCONATE-NACL 1-0.675 GM/50ML-% SOLUTION

## 2024-03-11 PROCEDURE — 25810000003 SODIUM CHLORIDE 0.9 % SOLUTION: Performed by: INTERNAL MEDICINE

## 2024-03-11 PROCEDURE — 25010000002 VANCOMYCIN PER 500 MG

## 2024-03-11 PROCEDURE — 99291 CRITICAL CARE FIRST HOUR: CPT | Performed by: INTERNAL MEDICINE

## 2024-03-11 PROCEDURE — 83735 ASSAY OF MAGNESIUM: CPT | Performed by: INTERNAL MEDICINE

## 2024-03-11 PROCEDURE — 82570 ASSAY OF URINE CREATININE: CPT | Performed by: INTERNAL MEDICINE

## 2024-03-11 PROCEDURE — 25010000002 HYDRALAZINE PER 20 MG: Performed by: NURSE PRACTITIONER

## 2024-03-11 PROCEDURE — 80053 COMPREHEN METABOLIC PANEL: CPT | Performed by: INTERNAL MEDICINE

## 2024-03-11 PROCEDURE — 94003 VENT MGMT INPAT SUBQ DAY: CPT

## 2024-03-11 PROCEDURE — 3E0G76Z INTRODUCTION OF NUTRITIONAL SUBSTANCE INTO UPPER GI, VIA NATURAL OR ARTIFICIAL OPENING: ICD-10-PCS | Performed by: INTERNAL MEDICINE

## 2024-03-11 PROCEDURE — 25010000002 AZITHROMYCIN PER 500 MG: Performed by: INTERNAL MEDICINE

## 2024-03-11 PROCEDURE — 70450 CT HEAD/BRAIN W/O DYE: CPT

## 2024-03-11 PROCEDURE — 82375 ASSAY CARBOXYHB QUANT: CPT

## 2024-03-11 PROCEDURE — 82948 REAGENT STRIP/BLOOD GLUCOSE: CPT

## 2024-03-11 PROCEDURE — 36600 WITHDRAWAL OF ARTERIAL BLOOD: CPT

## 2024-03-11 PROCEDURE — 85014 HEMATOCRIT: CPT | Performed by: INTERNAL MEDICINE

## 2024-03-11 PROCEDURE — 63710000001 INSULIN REGULAR HUMAN PER 5 UNITS

## 2024-03-11 PROCEDURE — 84300 ASSAY OF URINE SODIUM: CPT | Performed by: INTERNAL MEDICINE

## 2024-03-11 PROCEDURE — 95819 EEG AWAKE AND ASLEEP: CPT

## 2024-03-11 PROCEDURE — 0 DIATRIZOATE MEGLUMINE & SODIUM PER 1 ML

## 2024-03-11 RX ORDER — CALCIUM GLUCONATE 20 MG/ML
1000 INJECTION, SOLUTION INTRAVENOUS ONCE
Status: COMPLETED | OUTPATIENT
Start: 2024-03-11 | End: 2024-03-11

## 2024-03-11 RX ORDER — CLONIDINE HYDROCHLORIDE 0.1 MG/1
0.1 TABLET ORAL EVERY 12 HOURS SCHEDULED
Status: DISCONTINUED | OUTPATIENT
Start: 2024-03-11 | End: 2024-03-12

## 2024-03-11 RX ORDER — HYDRALAZINE HYDROCHLORIDE 20 MG/ML
20 INJECTION INTRAMUSCULAR; INTRAVENOUS ONCE
Status: COMPLETED | OUTPATIENT
Start: 2024-03-11 | End: 2024-03-11

## 2024-03-11 RX ORDER — VANCOMYCIN HYDROCHLORIDE 1 G/200ML
1000 INJECTION, SOLUTION INTRAVENOUS ONCE
Status: DISCONTINUED | OUTPATIENT
Start: 2024-03-11 | End: 2024-03-11

## 2024-03-11 RX ORDER — AMLODIPINE BESYLATE 10 MG/1
10 TABLET ORAL
Status: DISCONTINUED | OUTPATIENT
Start: 2024-03-11 | End: 2024-04-04 | Stop reason: HOSPADM

## 2024-03-11 RX ORDER — DEXMEDETOMIDINE HYDROCHLORIDE 4 UG/ML
.2-1.5 INJECTION, SOLUTION INTRAVENOUS
Status: DISCONTINUED | OUTPATIENT
Start: 2024-03-11 | End: 2024-03-11

## 2024-03-11 RX ORDER — DOCUSATE SODIUM 50 MG/5 ML
100 LIQUID (ML) ORAL 2 TIMES DAILY
Status: DISCONTINUED | OUTPATIENT
Start: 2024-03-11 | End: 2024-03-21

## 2024-03-11 RX ORDER — LABETALOL HYDROCHLORIDE 5 MG/ML
20 INJECTION, SOLUTION INTRAVENOUS ONCE
Status: COMPLETED | OUTPATIENT
Start: 2024-03-11 | End: 2024-03-11

## 2024-03-11 RX ORDER — POLYETHYLENE GLYCOL 3350 17 G/17G
17 POWDER, FOR SOLUTION ORAL DAILY PRN
Status: DISCONTINUED | OUTPATIENT
Start: 2024-03-11 | End: 2024-03-14

## 2024-03-11 RX ORDER — HYDRALAZINE HYDROCHLORIDE 25 MG/1
25 TABLET, FILM COATED ORAL EVERY 8 HOURS SCHEDULED
Status: DISCONTINUED | OUTPATIENT
Start: 2024-03-11 | End: 2024-03-11

## 2024-03-11 RX ORDER — DEXTROSE MONOHYDRATE 25 G/50ML
25 INJECTION, SOLUTION INTRAVENOUS ONCE
Status: COMPLETED | OUTPATIENT
Start: 2024-03-11 | End: 2024-03-11

## 2024-03-11 RX ORDER — BISACODYL 10 MG
10 SUPPOSITORY, RECTAL RECTAL DAILY PRN
Status: DISCONTINUED | OUTPATIENT
Start: 2024-03-11 | End: 2024-03-23

## 2024-03-11 RX ORDER — ALBUTEROL SULFATE 2.5 MG/3ML
2.5 SOLUTION RESPIRATORY (INHALATION)
Status: DISCONTINUED | OUTPATIENT
Start: 2024-03-11 | End: 2024-03-14

## 2024-03-11 RX ORDER — DEXMEDETOMIDINE HYDROCHLORIDE 4 UG/ML
.2-1.5 INJECTION, SOLUTION INTRAVENOUS
Status: DISCONTINUED | OUTPATIENT
Start: 2024-03-11 | End: 2024-03-24

## 2024-03-11 RX ORDER — VALPROIC ACID 250 MG/5ML
250 SOLUTION ORAL EVERY 8 HOURS SCHEDULED
Status: DISCONTINUED | OUTPATIENT
Start: 2024-03-11 | End: 2024-03-16

## 2024-03-11 RX ORDER — LABETALOL HYDROCHLORIDE 5 MG/ML
20 INJECTION, SOLUTION INTRAVENOUS EVERY 4 HOURS PRN
Status: DISCONTINUED | OUTPATIENT
Start: 2024-03-11 | End: 2024-04-04 | Stop reason: HOSPADM

## 2024-03-11 RX ORDER — ALBUTEROL SULFATE 2.5 MG/3ML
10 SOLUTION RESPIRATORY (INHALATION) ONCE
Status: COMPLETED | OUTPATIENT
Start: 2024-03-11 | End: 2024-03-11

## 2024-03-11 RX ORDER — HYDRALAZINE HYDROCHLORIDE 50 MG/1
50 TABLET, FILM COATED ORAL EVERY 8 HOURS SCHEDULED
Status: DISCONTINUED | OUTPATIENT
Start: 2024-03-11 | End: 2024-03-12

## 2024-03-11 RX ORDER — FUROSEMIDE 10 MG/ML
20 INJECTION INTRAMUSCULAR; INTRAVENOUS ONCE
Status: COMPLETED | OUTPATIENT
Start: 2024-03-11 | End: 2024-03-11

## 2024-03-11 RX ADMIN — DEXTROSE MONOHYDRATE 25 G: 25 INJECTION, SOLUTION INTRAVENOUS at 01:54

## 2024-03-11 RX ADMIN — SODIUM ZIRCONIUM CYCLOSILICATE 10 G: 10 POWDER, FOR SUSPENSION ORAL at 13:23

## 2024-03-11 RX ADMIN — BRIMONIDINE TARTRATE 1 DROP: 2 SOLUTION/ DROPS OPHTHALMIC at 15:02

## 2024-03-11 RX ADMIN — SODIUM CHLORIDE 100 ML/HR: 9 INJECTION, SOLUTION INTRAVENOUS at 09:08

## 2024-03-11 RX ADMIN — HEPARIN SODIUM 5000 UNITS: 5000 INJECTION INTRAVENOUS; SUBCUTANEOUS at 21:39

## 2024-03-11 RX ADMIN — FUROSEMIDE 20 MG: 10 INJECTION, SOLUTION INTRAMUSCULAR; INTRAVENOUS at 01:53

## 2024-03-11 RX ADMIN — Medication 1000 MG: at 13:41

## 2024-03-11 RX ADMIN — CLONIDINE HYDROCHLORIDE 0.1 MG: 0.1 TABLET ORAL at 20:03

## 2024-03-11 RX ADMIN — PROPOFOL 30 MCG/KG/MIN: 10 INJECTION, EMULSION INTRAVENOUS at 04:05

## 2024-03-11 RX ADMIN — INSULIN HUMAN 10 UNITS: 100 INJECTION, SOLUTION PARENTERAL at 01:53

## 2024-03-11 RX ADMIN — SODIUM BICARBONATE 50 MEQ: 84 INJECTION INTRAVENOUS at 01:53

## 2024-03-11 RX ADMIN — BRIMONIDINE TARTRATE 1 DROP: 2 SOLUTION/ DROPS OPHTHALMIC at 20:02

## 2024-03-11 RX ADMIN — Medication 10 ML: at 08:32

## 2024-03-11 RX ADMIN — PROPOFOL 25 MCG/KG/MIN: 10 INJECTION, EMULSION INTRAVENOUS at 10:00

## 2024-03-11 RX ADMIN — CALCIUM GLUCONATE 1000 MG: 20 INJECTION, SOLUTION INTRAVENOUS at 01:53

## 2024-03-11 RX ADMIN — TIMOLOL MALEATE 1 DROP: 5 SOLUTION/ DROPS OPHTHALMIC at 20:02

## 2024-03-11 RX ADMIN — LABETALOL HYDROCHLORIDE 20 MG: 5 INJECTION, SOLUTION INTRAVENOUS at 17:41

## 2024-03-11 RX ADMIN — CEFEPIME 2000 MG: 2 INJECTION, POWDER, FOR SOLUTION INTRAVENOUS at 21:43

## 2024-03-11 RX ADMIN — Medication 20 MG: at 23:53

## 2024-03-11 RX ADMIN — SENNOSIDES 10 ML: 8.8 LIQUID ORAL at 08:30

## 2024-03-11 RX ADMIN — HEPARIN SODIUM 5000 UNITS: 5000 INJECTION INTRAVENOUS; SUBCUTANEOUS at 13:23

## 2024-03-11 RX ADMIN — DEXMEDETOMIDINE HYDROCHLORIDE 0.6 MCG/KG/HR: 400 INJECTION, SOLUTION INTRAVENOUS at 20:18

## 2024-03-11 RX ADMIN — CEFEPIME 2000 MG: 2 INJECTION, POWDER, FOR SOLUTION INTRAVENOUS at 13:22

## 2024-03-11 RX ADMIN — SODIUM ZIRCONIUM CYCLOSILICATE 10 G: 10 POWDER, FOR SUSPENSION ORAL at 02:35

## 2024-03-11 RX ADMIN — HYDRALAZINE HYDROCHLORIDE 25 MG: 25 TABLET ORAL at 13:22

## 2024-03-11 RX ADMIN — CEFEPIME 2000 MG: 2 INJECTION, POWDER, FOR SOLUTION INTRAVENOUS at 06:09

## 2024-03-11 RX ADMIN — 0.12% CHLORHEXIDINE GLUCONATE 15 ML: 1.2 RINSE ORAL at 20:02

## 2024-03-11 RX ADMIN — AZITHROMYCIN 500 MG: 500 INJECTION, POWDER, LYOPHILIZED, FOR SOLUTION INTRAVENOUS at 08:30

## 2024-03-11 RX ADMIN — AMLODIPINE BESYLATE 10 MG: 10 TABLET ORAL at 16:11

## 2024-03-11 RX ADMIN — Medication 10 ML: at 20:04

## 2024-03-11 RX ADMIN — SODIUM CHLORIDE 250 MG: 9 INJECTION, SOLUTION INTRAVENOUS at 15:00

## 2024-03-11 RX ADMIN — PANTOPRAZOLE SODIUM 40 MG: 40 INJECTION, POWDER, LYOPHILIZED, FOR SOLUTION INTRAVENOUS at 08:31

## 2024-03-11 RX ADMIN — ALBUTEROL SULFATE 2.5 MG: 2.5 SOLUTION RESPIRATORY (INHALATION) at 19:49

## 2024-03-11 RX ADMIN — HYDRALAZINE HYDROCHLORIDE 50 MG: 50 TABLET, FILM COATED ORAL at 21:40

## 2024-03-11 RX ADMIN — HYDRALAZINE HYDROCHLORIDE 20 MG: 20 INJECTION INTRAMUSCULAR; INTRAVENOUS at 20:03

## 2024-03-11 RX ADMIN — Medication 237 ML: at 00:15

## 2024-03-11 RX ADMIN — DIATRIZOATE MEGLUMINE AND DIATRIZOATE SODIUM 15 ML: 660; 100 LIQUID ORAL; RECTAL at 00:15

## 2024-03-11 RX ADMIN — SODIUM ZIRCONIUM CYCLOSILICATE 10 G: 10 POWDER, FOR SUSPENSION ORAL at 21:40

## 2024-03-11 RX ADMIN — FENTANYL CITRATE 50 MCG/HR: 50 INJECTION, SOLUTION INTRAMUSCULAR; INTRAVENOUS at 14:32

## 2024-03-11 RX ADMIN — 0.12% CHLORHEXIDINE GLUCONATE 15 ML: 1.2 RINSE ORAL at 00:16

## 2024-03-11 RX ADMIN — ALBUTEROL SULFATE 2.5 MG: 2.5 SOLUTION RESPIRATORY (INHALATION) at 15:34

## 2024-03-11 RX ADMIN — BRIMONIDINE TARTRATE 1 DROP: 2 SOLUTION/ DROPS OPHTHALMIC at 08:30

## 2024-03-11 RX ADMIN — VALPROIC ACID 250 MG: 250 SOLUTION ORAL at 21:40

## 2024-03-11 RX ADMIN — TIMOLOL MALEATE 1 DROP: 5 SOLUTION/ DROPS OPHTHALMIC at 08:30

## 2024-03-11 RX ADMIN — DOCUSATE SODIUM 100 MG: 50 LIQUID ORAL at 08:30

## 2024-03-11 RX ADMIN — DEXMEDETOMIDINE HYDROCHLORIDE 0.2 MCG/KG/HR: 4 INJECTION, SOLUTION INTRAVENOUS at 13:23

## 2024-03-11 RX ADMIN — 0.12% CHLORHEXIDINE GLUCONATE 15 ML: 1.2 RINSE ORAL at 08:29

## 2024-03-11 RX ADMIN — ALBUTEROL SULFATE 10 MG: 2.5 SOLUTION RESPIRATORY (INHALATION) at 02:08

## 2024-03-11 RX ADMIN — SODIUM CHLORIDE 250 MG: 9 INJECTION, SOLUTION INTRAVENOUS at 06:09

## 2024-03-11 NOTE — SIGNIFICANT NOTE
03/11/24 0805   SLP Deferred Reason   SLP Deferred Reason Routine  (Pt currently intubated. SLP will place on hold and await re-consult when appropriate.)

## 2024-03-11 NOTE — PROCEDURES
Intubation    Date/Time: 3/10/2024 10:41 PM    Performed by: Melvin Gonzalez MD  Authorized by: Melvin Gonzalez MD  Consent: The procedure was performed in an emergent situation. Verbal consent obtained. Written consent obtained.  Consent given by: power of   Indications: airway protection  Intubation method: fiberoptic oral  Patient status: paralyzed (RSI)  Preoxygenation: BVM  Pretreatment medications: midazolam  Sedatives: ketamine  Paralytic: rocuronium  Laryngoscope size: Mac 4  Tube size: 7.5 mm  Tube type: cuffed  Number of attempts: 1  Cords visualized: yes  Post-procedure assessment: ETCO2 monitor and CO2 detector  Breath sounds: equal  Cuff inflated: yes  ETT to lip: 24 cm  Tube secured with: ETT ramirez  Chest x-ray interpreted by radiologist.  Chest x-ray findings: endotracheal tube in appropriate position  Patient tolerance: patient tolerated the procedure well with no immediate complications

## 2024-03-11 NOTE — CONSULTS
Clinical Nutrition     Nutrition Support Assessment  Reason for Visit: Physician consult, EN    Patient Name: Omkar Nava  YOB: 1955  MRN: [id number]  Date of Encounter: 03/11/24 15:36 EDT  Admission date: 3/10/2024    RD recommends the following for Nutrition Support:  Initiate continuous EN regimen of Novasource Renal @ 20 ml/hr and advance by 10 ml/hr Q 8 hr as tolerated to goal rate of 40 ml/hr, water flushes 10 ml/hr.     At goal this regimen will provide: 800 ml, 1600 kcal(103% est. needs), 73 g protein(85% est. needs), 0 g fiber, and 576 ml FW in formula +200 ml flushes = 776 ml total water  *will increase protein to better meet needs pending renal stability.    Nutrition Assessment   Admission Diagnosis:  Healthcare-associated pneumonia [J18.9]      Problem List:    Healthcare-associated pneumonia    Essential hypertension    Neurocognitive disorder    AMS (altered mental status)    Dementia    Dysphagia    Type 2 diabetes mellitus with diabetic chronic kidney disease    Acute respiratory failure with hypoxia    Acute kidney injury superimposed on chronic kidney disease    Hyperkalemia    Bradycardia    Hypothermia        PMH:   He  has a past medical history of Anemia, Dementia, Diabetes mellitus, Dysphagia, GERD (gastroesophageal reflux disease), History of alcohol abuse, History of cocaine use, History of marijuana use, Hypertension, Osteomyelitis, Poor historian, and Visual impairment.    PSH:  He  has a past surgical history that includes Eye surgery; Foot Amputation (Left, 10/18/2022); and Foot Amputation (Left, 12/5/2022).      Applicable Nutrition Concerns:   Skin:  Oral:  GI:      Applicable Interval History:         Reported/Observed/Food/Nutrition Related History:     Pt presents in ARF, intubated/sedated. Intensivist consulted for EN and has already placed order for Novasource which RD concurs with. Pt from NH and came to ED after choking incident. Pt with  severe dementia and aggression. Advanced chronic diseases HF, CKD, DM, dysphagia. Pt is blind.   Pt has NG. Did have some emesis on POA, KUB showed stool burden, pt now has had several BMs. No further emesis. Pt with significantly high Potassium on POA, improved with several doses lokelma to 6.0 today. Renal fx labs poor, A/C renal failure per Nephrology. Pt was receiving propofol but this was stopped, plan to transition to precedex and fentanyl. Pt was also receiving NS @ 100 ml/hr which was d/c this am. No current vasopressor support.     Labs    Labs Reviewed: Yes     Results from last 7 days   Lab Units 03/11/24  1228 03/11/24  0424 03/11/24  0027 03/10/24  1241 03/10/24  0944 03/10/24  0919 03/10/24  0912   GLUCOSE mg/dL 95 103* 80   < > 147*  --   --    BUN mg/dL 57* 64* 63*   < > 63*  --   --    CREATININE mg/dL 2.02* 2.29* 2.09*   < > 1.78*   < >  --    SODIUM mmol/L 146* 143 146*   < > 139  --   --    CHLORIDE mmol/L 115* 111* 115*   < > 111*  --   --    POTASSIUM mmol/L 6.0* 5.7* 6.8*   < > 7.4*  --   --    PHOSPHORUS mg/dL  --  3.4  --   --   --   --   --    MAGNESIUM mg/dL  --  2.4  --   --   --   --   --    ALT (SGPT) U/L  --  46*  --   --  46*  --   --    LACTATE mmol/L  --   --   --   --   --   --  1.0    < > = values in this interval not displayed.       Results from last 7 days   Lab Units 03/11/24  0424 03/10/24  0944   ALBUMIN g/dL 3.4* 3.8       Results from last 7 days   Lab Units 03/11/24  1133 03/11/24  0611 03/11/24  0506 03/11/24  0408 03/11/24  0258 03/11/24  0204   GLUCOSE mg/dL 106 100 110 109 140* 275*     Lab Results   Lab Value Date/Time    HGBA1C 7.00 (H) 10/16/2022 0432    HGBA1C 8.7 (H) 06/25/2022 0242    HGBA1C 13.4 04/14/2022 1058         Results from last 7 days   Lab Units 03/10/24  0944   PROBNP pg/mL 434.3         Medications    Medications Reviewed: Yes  Pertinent: colace, senokot, protonix, depacon, abx  Infusion: precedex, fenanyl 100 mcg/hr, propofol d/c at 14:00  PRN:  "    Intake/Ouptut 24 hrs (0701 - 0700)   I&O's Reviewed: Yes   Intake & Output (last day)         03/10 0701  03/11 0700 03/11 0701  03/12 0700    I.V. (mL/kg) 1378.5 (15.3) 980.2 (10.9)    NG/GT 60 100    IV Piggyback 550 582.2    Total Intake(mL/kg) 1988.5 (22.1) 1662.4 (18.5)    Urine (mL/kg/hr) 1325 620 (0.8)    Emesis/NG output 0     Stool 0 0    Total Output 1325 620    Net +663.5 +1042.4          Stool Unmeasured Occurrence 4 x 3 x    Emesis Unmeasured Occurrence 2 x           Anthropometrics     Height: Height: 151.9 cm (59.8\")  Last Filed Weight: Weight: 90 kg (198 lb 6.6 oz) (03/11/24 0400)  Method: Weight Method: Bed scale  BMI: BMI (Calculated): 39  BMI classification: Obese Class II: 35-39.9kg/m2  IBW:      UBW: fluctuates 2/2 CKD ~180-200 lb since 2022   Weight       Weight (kg) Weight (lbs) Weight Method Visit Report   8/9/2023 88.905 kg  196 lb  Bed scale     1/15/2024 93.441 kg  206 lb  Estimated     1/16/2024 90.855 kg  200 lb 4.8 oz  Bed scale      90 kg  198 lb 6.6 oz      1/17/2024 95.21 kg  209 lb 14.4 oz      1/18/2024 85.548 kg  188 lb 9.6 oz  Bed scale     1/19/2024 82.691 kg  182 lb 4.8 oz  Bed scale     1/20/2024 83.054 kg  183 lb 1.6 oz  Bed scale     1/21/2024 85.14 kg  187 lb 11.2 oz  Bed scale     2/5/2024 85.1 kg  187 lb 9.8 oz  Estimated     2/22/2024 84.823 kg  187 lb   --    3/10/2024 84.823 kg  187 lb  Stated      98.1 kg  216 lb 4.3 oz  Bed scale     3/11/2024 90 kg  198 lb 6.6 oz  Bed scale       Weight change: no significant changes    Nutrition Focused Physical Exam     Date: 3/11    Unable to perform exam due to: Pt unable to participate at time of visit, Defer pending indication    Needs Assessment   Date: 3/11    Height used:Height: 151.9 cm (59.8\")  Weights used: 85 kg / 187 lb (ABW), 53 kg / 117 lb (IBW)  *suspected dry weight likely ~185 lb given previously documented including recent MDOV weight. Likely currently holding fluid, will continue to monitor.    Estimated " Calorie needs: ~1550 Kcal/day while on vent  Method:  Kcals/KG 15-20 = 7116-5851  Method:  PSU (3/11 Tmax-36.6, Ve-7.37) = 1544    Estimated Protein needs: ~85 g PRO/day with current renal fx  Method: 1 g/Kg ABW = 85  Method: 2 g/Kg IBW = 106    Estimated Fluid needs: ~ ml/day   Per clinical status    Current Nutrition Prescription     PO: NPO Diet NPO Type: Strict NPO  Oral Nutrition Supplement:   Intake: N/A      Nutrition Diagnosis   Date: 3/11  Updated:   Problem Inadequate oral intake   Etiology ARF requiring mechanical ventilation   Signs/Symptoms Intubated/sedated, NPO   Status: new    Date: 3/11  Updated:  Problem Altered nutrition related laboratory values   Etiology A/C Renal Failure   Signs/Symptoms Creatinine 2.02, BUN 57, K 6.0   Status: new    Goal:   General: Nutrition to support treatment  PO: Advance diet as medically feasible/appropriate  EN/PN: Initiate EN    Nutrition Intervention      Follow treatment progress, Care plan reviewed, Nutrition support order placed    Initiate EN     Monitoring/Evaluation:   Per protocol, I&O, Pertinent labs, EN delivery/tolerance, Weight, GI status, Symptoms, POC/GOC, Swallow function, Hemodynamic stability      Chuyita Moreno RD, CNSC  Time Spent: 35m

## 2024-03-11 NOTE — CONSULTS
Referring Provider: Dr. Darren Cervantes  Reason for Consultation: Acute on chronic renal failure, hyperkalemia.    Subjective     Chief complaint shortness of breath    History of present illness:    68-year-old male with history of CKD baseline creatinine 1.3-1.5 mg/dL, history of CHF, decreased ejection fraction, HTN, DM2, chronic anemia, osteomyelitis s/p transmetatarsal amputation, dementia, lives in [facility].  Previous admission February with volume overload pulmonary edema, dysphagia he was placed on modified diet on discharge.  Patient on discharge was on diuretics.  Patient presented to ED via EMS with complaint of SOB and choking on the food, patient had a loss of conscious and cyanotic, received CPR, was found bradycardic.  Patient readmitted with JAYY and elevated creatinine, hyperkalemia, acute respiratory failure and mild anemia CT showed bilateral patchy infiltrate.  Patient was intubated last night.  Drop in urine output is noted, vital signs stable, creatinine 2.29, BUN 64, potassium 5.7 improved from 6.8.  Patient was treated with Lokelma, sodium bicarb IV, dextrose and insulin, IV calcium, CO2 23, magnesium 2.7, phosphorus 3.4, random vancomycin level 14.9.  Anemia is noted.       History  Past Medical History:   Diagnosis Date    Anemia     Dementia     Diabetes mellitus     Dysphagia     GERD (gastroesophageal reflux disease)     History of alcohol abuse     History of cocaine use     History of marijuana use     Hypertension     Osteomyelitis     Poor historian     records obtained from nursing home records & his family    Visual impairment    ,   Past Surgical History:   Procedure Laterality Date    AMPUTATION FOOT Left 10/18/2022    Procedure: PARTIAL FIRST RAY AMPUTATION LEFT;  Surgeon: Yeison Petty MD;  Location: [facility];  Service: Orthopedics;  Laterality: Left;    AMPUTATION FOOT Left 12/5/2022    Procedure: Transmetatarsal of Left Foot;  Surgeon: Yeison Petty  "MD;  Location: [facility];  Service: Orthopedics;  Laterality: Left;    EYE SURGERY     ,   Family History   Problem Relation Age of Onset    Diabetes Mother     Hypertension Mother     Hypertension Father     Diabetes Father     Diabetes Sister     Hypertension Brother     Diabetes Brother     Diabetes Maternal Grandmother     Diabetes Maternal Grandfather     Hypertension Brother     Diabetes Brother    ,   Social History     Socioeconomic History    Marital status: Single   Tobacco Use    Smoking status: Former     Current packs/day: 0.00     Average packs/day: 1 pack/day for 40.0 years (40.0 ttl pk-yrs)     Types: Cigarettes     Start date: 1977     Quit date: 2017     Years since quittin.8    Smokeless tobacco: Never   Vaping Use    Vaping status: Never Used   Substance and Sexual Activity    Alcohol use: Not Currently     Comment: histgry of alcohol abuse    Drug use: Yes     Types: Marijuana, \"Crack\" cocaine     Comment: PATIENT STATES \"IT'S BEEN A FEW DAYS\"    Sexual activity: Defer     E-cigarette/Vaping    E-cigarette/Vaping Use Never User     Passive Exposure No     Counseling Given No      E-cigarette/Vaping Substances    Nicotine No     THC No     CBD No     Flavoring No      E-cigarette/Vaping Devices    Disposable No     Pre-filled or Refillable Cartridge No     Refillable Tank No     Pre-filled Pod No          ,   Medications Prior to Admission   Medication Sig Dispense Refill Last Dose    acetaminophen (TYLENOL) 325 MG tablet Take 2 tablets by mouth Every 6 (Six) Hours As Needed for Mild Pain, Headache or Fever.   Past Week    albuterol sulfate  (90 Base) MCG/ACT inhaler Inhale 2 puffs Every 4 (Four) Hours As Needed for Wheezing or Shortness of Air.   Past Week    amLODIPine (NORVASC) 10 MG tablet Take 1 tablet by mouth Daily.   3/9/2024    ARIPiprazole (ABILIFY) 5 MG tablet Take 1 tablet by mouth Daily for 30 days. 30 tablet 0 3/9/2024    atorvastatin (LIPITOR) 20 MG tablet Take 1 " tablet by mouth Every Night.   3/9/2024    Benzalkonium Chloride (Remedy Antimicrobial Cleanser) 0.12 % liquid Apply to americo area topically as needed for preventative use for americo care after each incontinent episode and as needed.   3/9/2024    brimonidine-timolol (COMBIGAN) 0.2-0.5 % ophthalmic solution Administer 1 drop to both eyes 2 (Two) Times a Day.   3/9/2024    carvedilol (COREG) 12.5 MG tablet Take 1 tablet by mouth 2 (Two) Times a Day With Meals for 30 days. 60 tablet 0 3/9/2024    Cholecalciferol 50 MCG (2000 UT) tablet Take 2 tablets by mouth Every Morning.   3/9/2024    cloNIDine (CATAPRES) 0.1 MG tablet Take 1 tablet by mouth Every 6 (Six) Hours As Needed for High Blood Pressure. AS NEEDED FOR SBP GREATER THAN 160   Past Week    dimethicone (Remedy Nutrashield) 1 % cream Apply to buttocks/Americo area topically as needed for preventative Apply topically to buttocks/americo area.   Past Week    Dimethicone (Remedy Skin Repair) 1.5 % cream Apply to body topically as needed for dry skin every shift as needed.   Past Week    divalproex (DEPAKOTE) 125 MG DR tablet Take 3 tablets by mouth 2 (Two) Times a Day.   3/9/2024    docusate sodium (COLACE) 100 MG capsule Take 1 capsule by mouth 2 (Two) Times a Day As Needed for Constipation.   Past Week    famotidine (PEPCID) 20 MG tablet Take 1 tablet by mouth 2 (Two) Times a Day.   3/9/2024    ferrous sulfate 325 (65 FE) MG tablet Take 1 tablet by mouth Daily With Breakfast.   3/9/2024    FLUoxetine (PROzac) 20 MG capsule Take 1 capsule by mouth 2 (Two) Times a Day. Resume on 1/25/2024 30 capsule 0 3/9/2024    hydrALAZINE (APRESOLINE) 50 MG tablet Take 1 tablet by mouth 3 (Three) Times a Day for 30 days. 90 tablet 0 3/9/2024    Insulin Lispro (humaLOG) 100 UNIT/ML injection Inject 2-7 Units under the skin into the appropriate area as directed 4 (Four) Times a Day Before Meals & at Bedtime. (Patient taking differently: Inject  under the skin into the appropriate area as  directed 4 (Four) Times a Day Before Meals & at Bedtime. Per sliding scale: If =0 units                                151-200=2 units                                201-250=3 units                                251-300=4 units                                301-350=5 units                                351-400=6 units                              If greater then 400 call MD and give 8 units SQ)   3/9/2024    melatonin 5 MG tablet tablet Take 1 tablet by mouth Every Night.   3/9/2024    QUEtiapine (SEROquel) 25 MG tablet Take 0.5 tablets by mouth Daily for 30 days. 15 tablet 0 3/9/2024    QUEtiapine (SEROquel) 25 MG tablet Take 1 tablet by mouth Every Night for 30 days. 30 tablet 0 3/9/2024    thiamine (VITAMIN B-1) 100 MG tablet  tablet Take 1 tablet by mouth 3 (Three) Times a Day.   3/9/2024    torsemide (DEMADEX) 10 MG tablet Take 1 tablet by mouth Daily.   3/9/2024    traZODone (DESYREL) 50 MG tablet Take 0.5 tablets by mouth Every Night for 30 days. 15 tablet 0 3/9/2024   , Scheduled Meds:  azithromycin, 500 mg, Intravenous, Daily  brimonidine, 1 drop, Both Eyes, TID   And  timolol, 1 drop, Both Eyes, BID  cefepime, 2,000 mg, Intravenous, Q8H  chlorhexidine, 15 mL, Mouth/Throat, Q12H  sennosides, 10 mL, Nasogastric, BID   And  docusate sodium, 100 mg, Nasogastric, BID  heparin (porcine), 5,000 Units, Subcutaneous, Q8H  pantoprazole, 40 mg, Intravenous, Q24H  sodium chloride, 10 mL, Intravenous, Q12H  sodium zirconium cyclosilicate, 10 g, Oral, TID  valproate sodium, 250 mg, Intravenous, Q8H  vancomycin (dosing per levels), , Does not apply, Daily  vancomycin, 1,000 mg, Intravenous, Once   , Continuous Infusions:  dexmedetomidine, 0.2-1.5 mcg/kg/hr  fentanyl 10 mcg/mL,  mcg/hr  norepinephrine, 0.02-0.3 mcg/kg/min  Pharmacy to dose vancomycin,   propofol, 5-50 mcg/kg/min, Last Rate: 20 mcg/kg/min (03/11/24 1025)  sodium chloride, 100 mL/hr, Last Rate: 100 mL/hr (03/11/24 0908)   , PRN Meds:     [DISCONTINUED] senna-docusate sodium **AND** polyethylene glycol **AND** [DISCONTINUED] bisacodyl **AND** bisacodyl    dextrose    fentaNYL    glucagon (human recombinant)    nitroglycerin    Pharmacy to dose vancomycin    prochlorperazine    sodium chloride    sodium chloride    sodium chloride, and Allergies:  Patient has no known allergies.    Review of Systems  Review of systems could not be obtained due to   patient confusion. emergent nature of case.    Objective     Vital Signs  Temp:  [89.6 °F (32 °C)-98.2 °F (36.8 °C)] 98.2 °F (36.8 °C)  Heart Rate:  [37-86] 77  Resp:  [14-26] 18  BP: ()/(23-97) 152/68  FiO2 (%):  [35 %-100 %] 35 %    I/O this shift:  In: 971.5 [I.V.:554.7; NG/GT:100; IV Piggyback:316.8]  Out: 295 [Urine:295]  I/O last 3 completed shifts:  In: 1988.5 [I.V.:1378.5; NG/GT:60; IV Piggyback:550]  Out: 1325 [Urine:1325]    Physical Exam:  General appearance: -American male of light color, intubated on ventilator.  HEENT: Atraumatic normocephalic head, eyes pupil reactive, nose no bleed, o oral intubated neck restricted due to intubation, no JVD, no lymph node enlargement, trachea midline.  Lungs: Vent sounds are heard, equal chest movement, no crepitation.  Heart: Normal S1, S2, no gallop, murmur, RRR.  Abdomen: Soft, positive bowel sounds, no organomegaly.  Extremities: Left fourth toe amputated.  Trace pretibial edema, no cyanosis, no joint swelling.  Neuro: Cannot be evaluated due to patient condition  Psych: Cannot be evaluated due to patient condition  Skin: Skin is warm and dry.  Areas of abrasion healing noted.  : Story catheter in place.    Results Review:   I reviewed the patient's new clinical results.  I reviewed the patient's new imaging results and agree with the interpretation.    WBC WBC   Date Value Ref Range Status   03/11/2024 6.19 3.40 - 10.80 10*3/mm3 Final   03/11/2024 4.95 3.40 - 10.80 10*3/mm3 Final   03/10/2024 4.43 3.40 - 10.80 10*3/mm3 Final      HGB  "Hemoglobin   Date Value Ref Range Status   03/11/2024 7.2 (L) 13.0 - 17.7 g/dL Final   03/11/2024 7.5 (L) 13.0 - 17.7 g/dL Final   03/10/2024 8.4 (L) 13.0 - 17.7 g/dL Final      HCT Hematocrit   Date Value Ref Range Status   03/11/2024 23.5 (L) 37.5 - 51.0 % Final   03/11/2024 24.5 (L) 37.5 - 51.0 % Final   03/10/2024 27.8 (L) 37.5 - 51.0 % Final      Platlets No results found for: \"LABPLAT\"   MCV MCV   Date Value Ref Range Status   03/11/2024 93.3 79.0 - 97.0 fL Final   03/11/2024 94.2 79.0 - 97.0 fL Final   03/10/2024 96.5 79.0 - 97.0 fL Final          Sodium Sodium   Date Value Ref Range Status   03/11/2024 143 136 - 145 mmol/L Final   03/11/2024 146 (H) 136 - 145 mmol/L Final   03/10/2024 140 136 - 145 mmol/L Final   03/10/2024 143 136 - 145 mmol/L Final   03/10/2024 139 136 - 145 mmol/L Final      Potassium Potassium   Date Value Ref Range Status   03/11/2024 5.7 (H) 3.5 - 5.2 mmol/L Final   03/11/2024 6.8 (C) 3.5 - 5.2 mmol/L Final   03/10/2024 6.7 (C) 3.5 - 5.2 mmol/L Final   03/10/2024 6.7 (C) 3.5 - 5.2 mmol/L Final   03/10/2024 7.4 (C) 3.5 - 5.2 mmol/L Final      Chloride Chloride   Date Value Ref Range Status   03/11/2024 111 (H) 98 - 107 mmol/L Final   03/11/2024 115 (H) 98 - 107 mmol/L Final   03/10/2024 112 (H) 98 - 107 mmol/L Final   03/10/2024 115 (H) 98 - 107 mmol/L Final   03/10/2024 111 (H) 98 - 107 mmol/L Final      CO2 CO2   Date Value Ref Range Status   03/11/2024 23.0 22.0 - 29.0 mmol/L Final   03/11/2024 23.0 22.0 - 29.0 mmol/L Final   03/10/2024 20.0 (L) 22.0 - 29.0 mmol/L Final   03/10/2024 21.0 (L) 22.0 - 29.0 mmol/L Final   03/10/2024 22.0 22.0 - 29.0 mmol/L Final      BUN BUN   Date Value Ref Range Status   03/11/2024 64 (H) 8 - 23 mg/dL Final   03/11/2024 63 (H) 8 - 23 mg/dL Final   03/10/2024 63 (H) 8 - 23 mg/dL Final   03/10/2024 62 (H) 8 - 23 mg/dL Final   03/10/2024 63 (H) 8 - 23 mg/dL Final      Creatinine Creatinine   Date Value Ref Range Status   03/11/2024 2.29 (H) 0.76 - 1.27 " "mg/dL Final   03/11/2024 2.09 (H) 0.76 - 1.27 mg/dL Final   03/10/2024 1.96 (H) 0.76 - 1.27 mg/dL Final   03/10/2024 1.93 (H) 0.76 - 1.27 mg/dL Final   03/10/2024 1.78 (H) 0.76 - 1.27 mg/dL Final   03/10/2024 2.10 (A) 0.60 - 1.30 mg/dL Final   03/10/2024 2.10 (H) 0.60 - 1.30 mg/dL Final     Comment:     Serial Number: [id number]:  [id number]      Calcium Calcium   Date Value Ref Range Status   03/11/2024 8.8 8.6 - 10.5 mg/dL Final   03/11/2024 8.9 8.6 - 10.5 mg/dL Final   03/10/2024 8.8 8.6 - 10.5 mg/dL Final   03/10/2024 8.9 8.6 - 10.5 mg/dL Final   03/10/2024 8.8 8.6 - 10.5 mg/dL Final      PO4 No results found for: \"CAPO4\"   Albumin Albumin   Date Value Ref Range Status   03/11/2024 3.4 (L) 3.5 - 5.2 g/dL Final   03/10/2024 3.8 3.5 - 5.2 g/dL Final      Magnesium Magnesium   Date Value Ref Range Status   03/11/2024 2.4 1.6 - 2.4 mg/dL Final      Uric Acid No results found for: \"URICACID\"         azithromycin, 500 mg, Intravenous, Daily  brimonidine, 1 drop, Both Eyes, TID   And  timolol, 1 drop, Both Eyes, BID  cefepime, 2,000 mg, Intravenous, Q8H  chlorhexidine, 15 mL, Mouth/Throat, Q12H  sennosides, 10 mL, Nasogastric, BID   And  docusate sodium, 100 mg, Nasogastric, BID  heparin (porcine), 5,000 Units, Subcutaneous, Q8H  pantoprazole, 40 mg, Intravenous, Q24H  sodium chloride, 10 mL, Intravenous, Q12H  sodium zirconium cyclosilicate, 10 g, Oral, TID  valproate sodium, 250 mg, Intravenous, Q8H  vancomycin (dosing per levels), , Does not apply, Daily  vancomycin, 1,000 mg, Intravenous, Once      dexmedetomidine, 0.2-1.5 mcg/kg/hr  fentanyl 10 mcg/mL,  mcg/hr  norepinephrine, 0.02-0.3 mcg/kg/min  Pharmacy to dose vancomycin,   propofol, 5-50 mcg/kg/min, Last Rate: 20 mcg/kg/min (03/11/24 1025)  sodium chloride, 100 mL/hr, Last Rate: 100 mL/hr (03/11/24 0908)        Assessment & Plan       Healthcare-associated pneumonia    Essential hypertension    Neurocognitive disorder    AMS (altered mental status)    " Dementia    Dysphagia    Type 2 diabetes mellitus with diabetic chronic kidney disease    Acute respiratory failure with hypoxia    Acute kidney injury superimposed on chronic kidney disease    Hyperkalemia    Bradycardia    Hypothermia    1.  Acute on chronic renal failure CKD stage III.  Acute rise in creatinine with recent event.  Decreased renal perfusion, hypoxia, IV contrast.  2.  Hyperkalemia: Multifactorial due to JAYY, respiratory failure, acidosis.  3.  CKD stage III.  Recent creatinine 1.68.  CT abdomen showed normal-sized kidneys with no stones or hydronephrosis.  4.  Respiratory failure: On ventilator.  CT angio negative for pulmonary embolism  5.  Essential hypertension.  6.  Altered mental status.  7.  Bradycardia.  8.  Dementia.    Recommendations:  Oliguric, started to  urine output again.  Trace edema is noted will DC the IV fluids  Monitor potassium level closely.  Check urine sodium, protein, creatinine.  Avoid nephrotoxic medications.  Keep systolic blood pressure greater than 100.  Check volume status.  Check labs regularly  Daily evaluation for renal replacement therapy will be done.  No dialysis for now.  Adjust medication for the new GFR.  Case discussed with the daughter in the room.  Case discussed with patient's sister who has history of ESRD.  High risk complex patient with multiple medical problems.  Hailey Alexis MD  03/11/24  @[facility]

## 2024-03-11 NOTE — PROGRESS NOTES
Pharmacy Consult-Vancomycin Dosing  Omkar Nava is a  68 y.o. male receiving vancomycin therapy.     Indication:  Pneumonia  Consulting Provider: Intensivist  ID Consult:  No    Goal AUC: 400 - 600 mg/L*hr    Current Antimicrobial Therapy  Anti-Infectives (From admission, onward)      Ordered     Dose/Rate Route Frequency Start Stop    03/11/24 1033  vancomycin 1000 mg/250 mL 0.9% NS IVPB ([facility])        Ordering Provider: Antolin Guaman PharmD    1,000 mg  over 60 Minutes Intravenous Once 03/11/24 1130      03/10/24 1254  cefepime 2000 mg IVPB in 100 mL NS ([facility]BP)        Ordering Provider: Kurtis Cedeño MD    2,000 mg  over 4 Hours Intravenous Every 8 Hours 03/10/24 2200 03/15/24 2159    03/10/24 1343  vancomycin (dosing per levels)        Ordering Provider: Sarkis Easley RPH     Does not apply Daily 03/10/24 1430 03/15/24 0859    03/10/24 1404  vancomycin IVPB 1750 mg in 0.9% Sodium Chloride (premix) 500 mL        Ordering Provider: Sarkis Easley RPH    1,750 mg  285.7 mL/hr over 105 Minutes Intravenous Once 03/10/24 1430 03/10/24 1854    03/10/24 1254  azithromycin (ZITHROMAX) 500 mg in sodium chloride 0.9 % 250 mL IVPB-VTB        Ordering Provider: Kurtis Cedeño MD    500 mg  over 60 Minutes Intravenous Daily 03/10/24 1400 03/13/24 0859    03/10/24 1254  cefepime 2000 mg IVPB in 100 mL NS (MBP)        Ordering Provider: Kurtis Cedeño MD    2,000 mg  over 30 Minutes Intravenous Once 03/10/24 1400 03/10/24 1413    03/10/24 1254  Pharmacy to dose vancomycin        Ordering Provider: Kurtis Cedeño MD     Does not apply Continuous PRN 03/10/24 1253 03/15/24 1252    03/10/24 0934  piperacillin-tazobactam (ZOSYN) 3.375 g IVPB in 100 mL NS MBP (CD)        Ordering Provider: Omkar Seth MD    3.375 g Intravenous Once 03/10/24 0950 03/10/24 1109          Allergies  Allergies as of 03/10/2024    (No Known Allergies)     Labs    Results from last 7 days  "  Lab Units 03/11/24  0424 03/11/24  0027 03/10/24  1726   BUN mg/dL 64* 63* 63*   CREATININE mg/dL 2.29* 2.09* 1.96*     Results from last 7 days   Lab Units 03/11/24  0424 03/11/24  0027 03/10/24  0912   WBC 10*3/mm3 6.19 4.95 4.43     Evaluation of Dosing     Is Patient on Dialysis or Renal Replacement: No    Ht - 151.9 cm (59.8\")  Wt - 90 kg (198 lb 6.6 oz)    Intake & Output (last 2 days)         03/09 0701  03/10 0700 03/10 0701 03/11 0700 03/11 0701 03/12 0700    I.V. (mL/kg)  1378.5 (15.3) 554.7 (6.2)    NG/GT  60 100    IV Piggyback  550 316.8    Total Intake(mL/kg)  1988.5 (22.1) 971.5 (10.8)    Urine (mL/kg/hr)  1325 295 (0.8)    Emesis/NG output  0     Stool  0 0    Total Output  1325 295    Net  +663.5 +676.5           Stool Unmeasured Occurrence  4 x 2 x    Emesis Unmeasured Occurrence  2 x               Microbiology and Radiology  Microbiology Results (last 10 days)       Procedure Component Value - Date/Time    Respiratory Culture - Sputum, NT Suction [[id number]] Collected: 03/11/24 0104    Lab Status: Preliminary result Specimen: Sputum from NT Suction Updated: 03/11/24 0648     Gram Stain Many (4+) WBCs per low power field      Rare (1+) Epithelial cells per low power field      Rare (1+) Yeast    MRSA Screen, PCR (Inpatient) - Swab, Nares [[id number]]  (Abnormal) Collected: 03/10/24 1415    Lab Status: Final result Specimen: Swab from Nares Updated: 03/10/24 1621     MRSA PCR Positive    Narrative:      The negative predictive value of this diagnostic test is high and should only be used to consider de-escalating anti-MRSA therapy. A positive result may indicate colonization with MRSA and must be correlated clinically.    Respiratory Panel PCR w/COVID-19(SARS-CoV-2) GIANNI/SIENNA/ANNA/PAD/COR/ASHIA In-House, NP Swab in UT/VTM, 2 HR TAT - Swab, Nasopharynx [[id number]]  (Normal) Collected: 03/10/24 1415    Lab Status: Final result Specimen: Swab from Nasopharynx Updated: 03/10/24 1518     ADENOVIRUS, PCR " Not Detected     Coronavirus 229E Not Detected     Coronavirus HKU1 Not Detected     Coronavirus NL63 Not Detected     Coronavirus OC43 Not Detected     COVID19 Not Detected     Human Metapneumovirus Not Detected     Human Rhinovirus/Enterovirus Not Detected     Influenza A PCR Not Detected     Influenza B PCR Not Detected     Parainfluenza Virus 1 Not Detected     Parainfluenza Virus 2 Not Detected     Parainfluenza Virus 3 Not Detected     Parainfluenza Virus 4 Not Detected     RSV, PCR Not Detected     Bordetella pertussis pcr Not Detected     Bordetella parapertussis PCR Not Detected     Chlamydophila pneumoniae PCR Not Detected     Mycoplasma pneumo by PCR Not Detected    Narrative:      In the setting of a positive respiratory panel with a viral infection PLUS a negative procalcitonin without other underlying concern for bacterial infection, consider observing off antibiotics or discontinuation of antibiotics and continue supportive care. If the respiratory panel is positive for atypical bacterial infection (Bordetella pertussis, Chlamydophila pneumoniae, or Mycoplasma pneumoniae), consider antibiotic de-escalation to target atypical bacterial infection.    Blood Culture - Blood, Hand, Right [[id number]]  (Normal) Collected: 03/10/24 0944    Lab Status: Preliminary result Specimen: Blood from Hand, Right Updated: 03/11/24 1000     Blood Culture No growth at 24 hours    Blood Culture - Blood, Arm, Right [[id number]]  (Normal) Collected: 03/10/24 0924    Lab Status: Preliminary result Specimen: Blood from Arm, Right Updated: 03/11/24 1000     Blood Culture No growth at 24 hours    COVID-19 and FLU A/B PCR, 1 HR TAT - Swab, Nasopharynx [[id number]]  (Normal) Collected: 03/10/24 0923    Lab Status: Final result Specimen: Swab from Nasopharynx Updated: 03/10/24 1017     COVID19 Not Detected     Influenza A PCR Not Detected     Influenza B PCR Not Detected    Narrative:      Fact sheet for providers:  https://www.fda.gov/media/142595/download    Fact sheet for patients: https://www.fda.gov/media/823202/download    Test performed by PCR.            Reported Vancomycin Levels    Results from last 7 days   Lab Units 03/11/24  0424   VANCOMYCIN RM mcg/mL 14.90                   Assessment/Plan:  Noted SCr increase with unstable UOP over the last 24 hours.  Vancomycin 1g IV x 1.  Will continue pulse dosing for now.  Level check 3/12 with AM labs.  Discussed linezolid therapy with MD; will not change to this due to significant psych medications in history and avoiding interaction potential.  Pharmacy will continue to follow and adjust dose based on renal function, drug levels, and clinical status.    Thanks,  Sarkis Shelton, PharmD, BCPS, BCCCP  03/11/24  11:09 EDT

## 2024-03-11 NOTE — PROCEDURES
Insert Central Line At Bedside    Date/Time: 3/10/2024 10:12 PM    Performed by: Sara Crocker APRN  Authorized by: Sara Crocker APRN  Consent: Written consent obtained.  Risks and benefits: risks, benefits and alternatives were discussed  Consent given by: patient  Patient identity confirmed: arm band and provided demographic data  Indications: vascular access  Anesthesia: local infiltration    Anesthesia:  Local Anesthetic: lidocaine 1% without epinephrine  Anesthetic total: 5 mL    Sedation:  Patient sedated: yes  Sedatives: see MAR for details  Analgesia: see MAR for details  Vitals: Vital signs were monitored during sedation.    Preparation: skin prepped with 2% chlorhexidine  Skin prep agent dried: skin prep agent completely dried prior to procedure  Sterile barriers: all five maximum sterile barriers used - cap, mask, sterile gown, sterile gloves, and large sterile sheet  Hand hygiene: hand hygiene performed prior to central venous catheter insertion  Location details: left internal jugular  Patient position: flat  Catheter type: triple lumen  Catheter size: 7 Fr  Pre-procedure: landmarks identified  Ultrasound guidance: yes  Sterile ultrasound techniques: sterile gel and sterile probe covers were used  Number of attempts: 1  Successful placement: yes  Post-procedure: line sutured and dressing applied  Assessment: blood return through all ports, free fluid flow, placement verified by x-ray and no pneumothorax on x-ray  Patient tolerance: patient tolerated the procedure well with no immediate complications  Comments: Dark, nonpulsatile blood was aspirated from LIJ using finder needle under ultrasound.  Wire was threaded via finder needle without difficulty and visualized in LIJ under ultrasound.  Dilator was advanced over wire after small incision was made in the skin.  Dilator was removed.  Catheter was advanced over the wire up to 17 cm.  Wire was removed.  Flushed end caps were placed on all three  ports.  All three ports aspirated dark, nonpulsatile blood and flushed back well.  Catheter was sutured in place x 2.  An antibacterial dressing was applied over the site.      Ramonita Crocker, MSN, APRN, ACN-AG  Pulmonary and Critical Care Medicine  Electronically signed by NARGIS Oliver, 03/10/24, 10:13 PM EDT.

## 2024-03-11 NOTE — CASE MANAGEMENT/SOCIAL WORK
Discharge Planning Assessment  [facility]     Patient Name: Omkar Nava  MRN: [id number]  Today's Date: 3/11/2024    Admit Date: 3/10/2024    Plan: Return to [facility]   Discharge Needs Assessment       Row Name 03/11/24 1440       Living Environment    People in Home facility resident  LTC at [facility]    Potentially Unsafe Housing Conditions unable to assess    In the past 12 months has the electric, gas, oil, or water company threatened to shut off services in your home? No    Primary Care Provided by other (see comments)    Provides Primary Care For no one, unable/limited ability to care for self    Family Caregiver if Needed child(mable), adult    Family Caregiver Names Idalia Lerma, daughter    Able to Return to Prior Arrangements yes       Resource/Environmental Concerns    Resource/Environmental Concerns none    Transportation Concerns none       Transportation Needs    In the past 12 months, has lack of transportation kept you from medical appointments or from getting medications? no    In the past 12 months, has lack of transportation kept you from meetings, work, or from getting things needed for daily living? No       Food Insecurity    Within the past 12 months, you worried that your food would run out before you got the money to buy more. Never true    Within the past 12 months, the food you bought just didn't last and you didn't have money to get more. Never true       Transition Planning    Patient/Family Anticipates Transition to long-term care facility    Patient/Family Anticipated Services at Transition        Discharge Needs Assessment    Equipment Currently Used at Home oxygen;walker, rolling;wheelchair    Concerns to be Addressed discharge planning                   Discharge Plan       Row Name 03/11/24 1441       Plan    Plan Return to [facility]    Plan Comments Spoke with Magaly at [facility] to initiate discharge planning.  Patient is a LTC resident at [facility] and does have a bed hold.  Patient is reported to need assistance with ADLs; uses a WC or RW, but can feed himself.  Patient uses 2L [place] oxygen at facility.  Facility MD is Deann Cao; insurance is Medicare.  Patient currently on mechanical ventilation.  CM will continue to follow.    Final Discharge Disposition Code 30 - still a patient                  Continued Care and Services - Admitted Since 3/10/2024       Destination       Service Provider Request Status Selected Services Address Phone Fax Patient Preferred    [facility] - [facility] [address] [facility] [address] [phone] --                  Selected Continued Care - Prior Encounters Includes continued care and service providers with selected services from prior encounters from 12/11/2023 to 3/11/2024      Discharged on 2/13/2024 Admission date: 2/5/2024 - Discharge disposition: Intermediate Care       Destination       Service Provider Selected Services Address Phone Fax Patient Preferred    [facility] [address], [facility] [address] [phone] --                      Discharged on 1/21/2024 Admission date: 1/15/2024 - Discharge disposition: Skilled Nursing Facility (DC - External)      Destination       Service Provider Selected Services Address Phone Fax Patient Preferred    [facility] [address] [facility] [address] [phone] --       Internal Comment last updated by Rosemarie Yarbrough, RN 1/18/2024 1150    Does not have a bed hold    1/18: Spoke with Keyla, [facility] to LTC for now. Not med ready today.                                      Expected Discharge Date and Time       Expected Discharge Date Expected Discharge Time    Mar 15, 2024            Demographic Summary       Row Name 03/11/24 7658       General Information    Arrived From other (see comments)    Referral Source  admission list    Reason for Consult discharge planning    Preferred Language English       Contact Information    Permission Granted to Share Info With                    Functional Status       Row Name 03/11/24 1436       Functional Status    Usual Activity Tolerance fair       Functional Status, IADL    Medications completely dependent    Meal Preparation completely dependent    Housekeeping completely dependent    Laundry completely dependent    Shopping completely dependent                   Psychosocial    No documentation.                  Abuse/Neglect    No documentation.                  Legal    No documentation.                  Substance Abuse    No documentation.                  Patient Forms    No documentation.                     Daisy Kelly RN

## 2024-03-11 NOTE — PLAN OF CARE
Goal Outcome Evaluation:         Patient W/D BUE. Pupils irregular. Increased edema throughout shift. Very agitated during care. Moderate red-streaked secretions. UOP 1,100mL. Patient sedation changed to Fentanyl and Precedex per INT, see mar.  5 small BM. Tube feed started per order. -180s, APRN notified of increasing pressure. 2 home medications restarted and one time dose labetalol given, see mar.

## 2024-03-11 NOTE — SIGNIFICANT NOTE
Called to the patients bedside on 3/10 PM.  Spoke with patient's daughter (POA) and sister concerning clinical course.     Mr Nava was tachypneic on high flow 60L/80%. He had copious, tan respiratory secretions pooling in mouth and was requiring frequent suctioning to assist with clearance.  He was also encephalopathic-- responsive only to noxious stimuli. Subsequently intubated for airway protection. CT head ordered.     Patient remains oliguric. Ordered CK, urine myoglobin and CT abdomen and pelvis. Will consult renal in the AM. Shift agents PRN for hyperkalemia. Discussed possibility of dialysis with family as well. They wish to pursue ongoing, aggressive measures.     -- Shawn Gonzalez MD  Pulmonary/Critical Care

## 2024-03-11 NOTE — PROGRESS NOTES
Critical Care Note     LOS: 1 day   Patient Care Team:  Deann Cao MD as PCP - General (Internal Medicine)    Chief Complaint/Reason for visit:    Chief Complaint   Patient presents with    Aspiration     Acute respiratory failure  Aspiration pneumonia  Chronic congestive heart failure with reduced EF  Chronic kidney disease  Diabetes mellitus type 2  Dementia with delirium  Hypertension  Chronic anemia    Subjective     Interval History:     Patient was hospitalized yesterday with an aspiration event and pneumonia.  He deteriorated last night requiring intubation and mechanical ventilation.  Family reports that he stopped smoking 2 years ago when he entered the nursing home.  He is currently on a rate of 18 tidal volume 420 PEEP of 5 and 35% FiO2.  Nursing notes moderate secretions.  He was sedated on propofol.  He remains afebrile.  He is in sinus rhythm.  Urine output 1.3 L.  He did have 2 episodes of emesis yesterday.  Family is present at the bedside and notes that he uses a wheelchair and needs assistance with bathing, dressing, transferring.  He is also legally blind.    Review of Systems:    All systems were reviewed and negative except as noted in subjective.    Medical history, surgical history, social history, family history reviewed    Objective     Intake/Output:    Intake/Output Summary (Last 24 hours) at 3/11/2024 1206  Last data filed at 3/11/2024 1000  Gross per 24 hour   Intake 2860 ml   Output 1370 ml   Net 1490 ml       Nutrition:  NPO Diet NPO Type: Strict NPO    Infusions:  dexmedetomidine, 0.2-1.5 mcg/kg/hr  fentanyl 10 mcg/mL,  mcg/hr  norepinephrine, 0.02-0.3 mcg/kg/min  Pharmacy to dose vancomycin,   propofol, 5-50 mcg/kg/min, Last Rate: 20 mcg/kg/min (03/11/24 1025)        Mechanical Ventilator Settings:            Resp Rate (Set): 18     FiO2 (%): 35 %  PEEP/CPAP (cm H2O): 5 cm H20    Minute Ventilation (L/min) (Obs): 7.32 L/min  Resp Rate (Observed) Vent: 18  I:E Ratio  "(Set): 1:2.33  I:E Ratio (Obs): 1:2.3    PIP Observed (cm H2O): 20 cm H2O  Plateau Pressure (cm H2O): (S) 19 cm H2O    Telemetry: Sinus rhythm             Vital Signs  Blood pressure 152/68, pulse 77, temperature 98.2 °F (36.8 °C), temperature source Bladder, resp. rate 18, height 151.9 cm (59.8\"), weight 90 kg (198 lb 6.6 oz), SpO2 99%.    Physical Exam:  General Appearance:  Thin elderly appearing gentleman sedated, supine   Head:  No visible trauma   Eyes:          Conjunctiva pale, both pupils are distorted   Ears:     Throat: Orally intubated   Neck: Trachea midline, left IJ deep line   Back:      Lungs:   Respirations are bilateral, scattered expiratory rhonchi    Heart:  Regular rhythm, S1, S2 auscultated   Abdomen:   Flat, bowel sounds present, soft   Rectal:   Deferred   Extremities: Dressings on his right toe, left heel, left lower extremity and left ankle wounds.  No pitting edema   Pulses: Palpable radial pulses but I do not feel DP pulses, feet feel warm to touch   Skin: Multiple wounds currently dressed   Lymph nodes: No cervical adenopathy   Neurologic: Sedated      Results Review:     I reviewed the patient's new clinical results.   Results from last 7 days   Lab Units 03/11/24  0424 03/11/24  0027 03/10/24  1726 03/10/24  1241 03/10/24  0944   SODIUM mmol/L 143 146* 140   < > 139   POTASSIUM mmol/L 5.7* 6.8* 6.7*   < > 7.4*   CHLORIDE mmol/L 111* 115* 112*   < > 111*   CO2 mmol/L 23.0 23.0 20.0*   < > 22.0   BUN mg/dL 64* 63* 63*   < > 63*   CREATININE mg/dL 2.29* 2.09* 1.96*   < > 1.78*   CALCIUM mg/dL 8.8 8.9 8.8   < > 8.8   BILIRUBIN mg/dL 0.2  --   --   --  <0.2   ALK PHOS U/L 74  --   --   --  89   ALT (SGPT) U/L 46*  --   --   --  46*   AST (SGOT) U/L 28  --   --   --  32   GLUCOSE mg/dL 103* 80 101*   < > 147*    < > = values in this interval not displayed.     Results from last 7 days   Lab Units 03/11/24  0424 03/11/24  0027 03/10/24  0912   WBC 10*3/mm3 6.19 4.95 4.43   HEMOGLOBIN g/dL " 7.2* 7.5* 8.4*   HEMATOCRIT % 23.5* 24.5* 27.8*   PLATELETS 10*3/mm3 146 154 166   MONOCYTES % % 1.0*  --   --    EOSINOPHIL % % 0.0*  --   --      Results from last 7 days   Lab Units 03/11/24  0054   PH, ARTERIAL pH units 7.322*   PO2 ART mm Hg 102.0   PCO2, ARTERIAL mm Hg 44.2   HCO3 ART mmol/L 22.9     Lab Results   Component Value Date    BLOODCX No growth at 24 hours 03/10/2024     Lab Results   Component Value Date    URINECX 30,000-40,000 CFU/mL Normal Urogenital Preethi 05/06/2017       I reviewed the patient's new imaging including images and reports.    XR CHEST 1 VW, March 11, 2024    Date of Exam: 3/11/2024 4:58 AM EDT    Indication: Intubated Patient    Comparison: 3/10/2024    Findings:  ET tube NG tube and left IJ catheter appear in satisfactory position. The heart is enlarged. Dense groundglass and airspace disease of the lungs appears stable on the right, slightly improved on the left. No pneumothorax or effusion is seen. Heart is  mildly enlarged in size. Pulmonary vasculature is obscured.   Impression:     Impression:  Extensive bilateral pulmonary disease, mildly improved on the left.      Electronically Signed: Wisam Oquendo MD   3/11/2024 8:08 AM EDT     FINDINGS: CTA of the chest, March 10, 2024  No significant focal filling defects are identified to indicate the presence of pulmonary embolism.     There are extensive groundglass appearing multifocal infiltrates throughout the lungs bilaterally with interstitial changes. Many of these infiltrates or rounded in morphology. More pronounced consolidations are seen in the lung bases. Small bilateral   pleural effusions are noted. The findings suggest developing atypical/viral infection or multifocal pneumonia and may indicate changes of Covid 19 infection.     Mildly prominent hilar and mediastinal lymph nodes are noted which are likely reactive. No significant axillary lymphadenopathy is seen. The heart and great vessels are stable. Mild coronary  artery calcifications are noted. The thyroid gland is stable.   The esophagus is unremarkable.     The limited evaluation of the upper abdomen demonstrates no evidence for acute abnormality.     No acute osseous abnormalities are observed.     IMPRESSION:  1.No evidence for pulmonary embolism.  2.Evidence for ongoing atypical/viral infection versus multifocal pneumonia and potential changes of Covid 19 infection. Recommend correlation for signs or symptoms of acute infection and follow-up to ensure improvement/resolution.           Electronically Signed: Renny Canales MD    3/10/2024 10:28 AM EDT     Interpretation Summary echocardiogram January 15, 2024         Left ventricular ejection fraction appears to be 56 - 60%.    Left ventricular wall thickness is consistent with hypertrophy.    Estimated right ventricular systolic pressure from tricuspid regurgitation is mildly elevated (35-45 mmHg).    There is a small (1-2cm) pericardial effusion.    No evidence for pericardial tamponade    All medications reviewed.   azithromycin, 500 mg, Intravenous, Daily  brimonidine, 1 drop, Both Eyes, TID   And  timolol, 1 drop, Both Eyes, BID  cefepime, 2,000 mg, Intravenous, Q8H  chlorhexidine, 15 mL, Mouth/Throat, Q12H  sennosides, 10 mL, Nasogastric, BID   And  docusate sodium, 100 mg, Nasogastric, BID  heparin (porcine), 5,000 Units, Subcutaneous, Q8H  pantoprazole, 40 mg, Intravenous, Q24H  sodium chloride, 10 mL, Intravenous, Q12H  sodium zirconium cyclosilicate, 10 g, Oral, TID  valproate sodium, 250 mg, Intravenous, Q8H  vancomycin (dosing per levels), , Does not apply, Daily  vancomycin, 1,000 mg, Intravenous, Once          Assessment & Plan       Healthcare-associated pneumonia    Essential hypertension    Neurocognitive disorder    AMS (altered mental status)    Dementia    Dysphagia    Type 2 diabetes mellitus with diabetic chronic kidney disease    Acute respiratory failure with hypoxia    Acute kidney injury  superimposed on chronic kidney disease    Hyperkalemia    Bradycardia    Hypothermia    Debilitated 68-year-old gentleman with diabetes mellitus complicated by blindness, osteomyelitis, chronic kidney disease, underlying dementia with psychosis, dependent for mobility, bathing and dressing, chronic anemia, heart failure with normal EF who is now admitted for his third admission in the last 2.5 months for an aspiration event with aspiration pneumonia.  On his previous admission a modified barium swallow did reveal moderate oral and mild pharyngeal dysphagia February 5, 2024.  Respiratory status deteriorated last night and he required intubation.       #1 acute respiratory failure, aspiration pneumonia he is currently on a rate of 18 tidal volume 420 PEEP of 5 and 35% FiO2.  ABG on 100% postintubation revealed a pH of 7.32, CO2 44, O2 102.  FiO2 has successfully weaned to 35%.  However he has moderate tan secretions.  Gram stain shows yeast and many white cells but no bacteria.  MRSA PCR is positive.  Respiratory panel PCR is negative.  In February he was treated empirically for pneumonia with cefepime, linezolid.  He did not have positive cultures.  He has a known history of dysphagia with an abnormal modified barium swallow in February of this year.  With his significant persistent secretions I will not plan on weaning him today.  He apparently was a smoker up until nursing home admission 2 years ago.  His CT scan does not reveal advanced COPD.  He was not on inhalers as an outpatient.    -Support with mechanical ventilation  -Transition propofol to Precedex, heart rate allowing  -Stop azithromycin  -Continue vancomycin, cefepime  -Start bronchodilators  -Discussed permanent feeding tube placement with family given his recurrent aspiration pneumonias    #2 diabetes mellitus with chronic kidney disease, blindness, diabetic foot ulcers and osteomyelitis requiring toe amputation.  Blood glucoses are running 100-110.   He is currently NPO.  Today his creatinine is 2.29.  Potassium was elevated at 6.8 and he has received several doses of Lokelma.  Most recent potassium is 5.7.  February 11 his creatinine was 1.63.  He did get IV contrast for CTA of the chest.    -Continue Lokelma  -Nephrology to see  -Sliding scale insulin  -Renal ultrasound  -Story catheter for strict ins and outs  -Check an A1c  -Initiate tube feeding    #3 dementia with psychosis, nursing home resident for 2 years.  Currently he is in a wheelchair needs assist to transfer, needs assist for bathing and dressing.  Home medications include Seroquel, Depakote, Abilify, Prozac, trazodone.  He did fall in 2023 and sustained a neck injury without fracture.  He had swelling in the prevertebral space C2-C5.  He was placed in a cervical collar for ligamentous injuries.  Patient had been removing the collar on his own and cervical collar was removed.  Decision was made to leave the collar off as he had no acute fracture or dislocation.    -Resume Depakote  -Will restart some of his other psychiatric medications as we decrease sedation    #4 hypertension, congestive heart failure currently blood pressure is running 150.  He does take amlodipine, carvedilol, clonidine, hydralazine, torsemide as an outpatient.  Echocardiogram in January 2024 revealed an EF of 56 to 60% with some LVH and a small 1 to 2 cm pericardial effusion without evidence of tamponade.  He had some mild MR.    -Hold carvedilol, clonidine as I want to start Precedex and the combination may cause bradycardia  -Consider resuming hydralazine for hypertension and then adding the amlodipine  -Hold diuretics for now, but will restart particularly if we need to lower his potassium more        VTE Prophylaxis: subcutaneous heparin    Stress Ulcer Prophylaxis: Protonix    Bibi Cervantes MD  03/11/24  12:06 EDT      Time: Critical care 45 min  I personally provided care to this critically ill patient as  documented above.  Critical care time does not include time spent on separately billed procedures.  None of my critical care time was concurrent with other critical care providers.

## 2024-03-12 ENCOUNTER — APPOINTMENT (OUTPATIENT)
Dept: NEUROLOGY | Facility: HOSPITAL | Age: 69
End: 2024-03-12
Payer: MEDICARE

## 2024-03-12 ENCOUNTER — APPOINTMENT (OUTPATIENT)
Dept: GENERAL RADIOLOGY | Facility: HOSPITAL | Age: 69
End: 2024-03-12
Payer: MEDICARE

## 2024-03-12 LAB
ANION GAP SERPL CALCULATED.3IONS-SCNC: 8 MMOL/L (ref 5–15)
ARTERIAL PATENCY WRIST A: POSITIVE
ATMOSPHERIC PRESS: ABNORMAL MM[HG]
BASE EXCESS BLDA CALC-SCNC: -0.5 MMOL/L (ref 0–2)
BASOPHILS # BLD AUTO: 0.02 10*3/MM3 (ref 0–0.2)
BASOPHILS NFR BLD AUTO: 0.2 % (ref 0–1.5)
BDY SITE: ABNORMAL
BODY TEMPERATURE: 37
BUN SERPL-MCNC: 48 MG/DL (ref 8–23)
BUN/CREAT SERPL: 25.7 (ref 7–25)
CALCIUM SPEC-SCNC: 8.5 MG/DL (ref 8.6–10.5)
CHLORIDE SERPL-SCNC: 110 MMOL/L (ref 98–107)
CO2 BLDA-SCNC: 25.3 MMOL/L (ref 22–33)
CO2 SERPL-SCNC: 23 MMOL/L (ref 22–29)
COHGB MFR BLD: 1.7 % (ref 0–2)
CREAT SERPL-MCNC: 1.87 MG/DL (ref 0.76–1.27)
DEPRECATED RDW RBC AUTO: 55.7 FL (ref 37–54)
EGFRCR SERPLBLD CKD-EPI 2021: 39.2 ML/MIN/1.73
ELLIPTOCYTES BLD QL SMEAR: NORMAL
EOSINOPHIL # BLD AUTO: 0.04 10*3/MM3 (ref 0–0.4)
EOSINOPHIL NFR BLD AUTO: 0.4 % (ref 0.3–6.2)
EPAP: 0
ERYTHROCYTE [DISTWIDTH] IN BLOOD BY AUTOMATED COUNT: 16.1 % (ref 12.3–15.4)
GLUCOSE BLDC GLUCOMTR-MCNC: 185 MG/DL (ref 70–130)
GLUCOSE BLDC GLUCOMTR-MCNC: 212 MG/DL (ref 70–130)
GLUCOSE BLDC GLUCOMTR-MCNC: 237 MG/DL (ref 70–130)
GLUCOSE BLDC GLUCOMTR-MCNC: 250 MG/DL (ref 70–130)
GLUCOSE SERPL-MCNC: 217 MG/DL (ref 65–99)
HCO3 BLDA-SCNC: 24.1 MMOL/L (ref 20–26)
HCT VFR BLD AUTO: 25.9 % (ref 37.5–51)
HCT VFR BLD CALC: 24.9 % (ref 38–51)
HGB BLD-MCNC: 7.9 G/DL (ref 13–17.7)
HGB BLDA-MCNC: 8.1 G/DL (ref 13.5–17.5)
IMM GRANULOCYTES # BLD AUTO: 0.07 10*3/MM3 (ref 0–0.05)
IMM GRANULOCYTES NFR BLD AUTO: 0.8 % (ref 0–0.5)
INHALED O2 CONCENTRATION: 35 %
INR PPP: 1.38 (ref 0.89–1.12)
IPAP: 0
LYMPHOCYTES # BLD AUTO: 1.16 10*3/MM3 (ref 0.7–3.1)
LYMPHOCYTES NFR BLD AUTO: 12.6 % (ref 19.6–45.3)
MCH RBC QN AUTO: 28.8 PG (ref 26.6–33)
MCHC RBC AUTO-ENTMCNC: 30.5 G/DL (ref 31.5–35.7)
MCV RBC AUTO: 94.5 FL (ref 79–97)
METHGB BLD QL: 0.3 % (ref 0–1.5)
MODALITY: ABNORMAL
MONOCYTES # BLD AUTO: 0.85 10*3/MM3 (ref 0.1–0.9)
MONOCYTES NFR BLD AUTO: 9.2 % (ref 5–12)
NEUTROPHILS NFR BLD AUTO: 7.08 10*3/MM3 (ref 1.7–7)
NEUTROPHILS NFR BLD AUTO: 76.8 % (ref 42.7–76)
NRBC BLD AUTO-RTO: 0.2 /100 WBC (ref 0–0.2)
OXYHGB MFR BLDV: 93.2 % (ref 94–99)
PAW @ PEAK INSP FLOW SETTING VENT: 0 CMH2O
PCO2 BLDA: 38.6 MM HG (ref 35–45)
PCO2 TEMP ADJ BLD: 38.6 MM HG (ref 35–48)
PEEP RESPIRATORY: 5 CM[H2O]
PH BLDA: 7.4 PH UNITS (ref 7.35–7.45)
PH, TEMP CORRECTED: 7.4 PH UNITS
PHOSPHATE SERPL-MCNC: 2.9 MG/DL (ref 2.5–4.5)
PLAT MORPH BLD: NORMAL
PLATELET # BLD AUTO: 154 10*3/MM3 (ref 140–450)
PMV BLD AUTO: 12 FL (ref 6–12)
PO2 BLDA: 72.2 MM HG (ref 83–108)
PO2 TEMP ADJ BLD: 72.2 MM HG (ref 83–108)
POTASSIUM SERPL-SCNC: 5.1 MMOL/L (ref 3.5–5.2)
PROCALCITONIN SERPL-MCNC: 5.72 NG/ML (ref 0–0.25)
PROTHROMBIN TIME: 17.1 SECONDS (ref 12.2–14.5)
QT INTERVAL: 536 MS
QTC INTERVAL: 431 MS
RBC # BLD AUTO: 2.74 10*6/MM3 (ref 4.14–5.8)
SODIUM SERPL-SCNC: 141 MMOL/L (ref 136–145)
TOTAL RATE: 18 BREATHS/MINUTE
VANCOMYCIN SERPL-MCNC: 14.2 MCG/ML (ref 5–40)
VENTILATOR MODE: ABNORMAL
VT ON VENT VENT: 0.42 ML
WBC MORPH BLD: NORMAL
WBC NRBC COR # BLD AUTO: 9.22 10*3/MM3 (ref 3.4–10.8)

## 2024-03-12 PROCEDURE — 80048 BASIC METABOLIC PNL TOTAL CA: CPT | Performed by: INTERNAL MEDICINE

## 2024-03-12 PROCEDURE — 63710000001 INSULIN REGULAR HUMAN PER 5 UNITS: Performed by: NURSE PRACTITIONER

## 2024-03-12 PROCEDURE — 84100 ASSAY OF PHOSPHORUS: CPT

## 2024-03-12 PROCEDURE — 95819 EEG AWAKE AND ASLEEP: CPT

## 2024-03-12 PROCEDURE — 25010000002 VANCOMYCIN 10 G RECONSTITUTED SOLUTION

## 2024-03-12 PROCEDURE — 82805 BLOOD GASES W/O2 SATURATION: CPT

## 2024-03-12 PROCEDURE — 71045 X-RAY EXAM CHEST 1 VIEW: CPT

## 2024-03-12 PROCEDURE — 36600 WITHDRAWAL OF ARTERIAL BLOOD: CPT

## 2024-03-12 PROCEDURE — 25010000002 FUROSEMIDE PER 20 MG: Performed by: INTERNAL MEDICINE

## 2024-03-12 PROCEDURE — 84145 PROCALCITONIN (PCT): CPT

## 2024-03-12 PROCEDURE — 85025 COMPLETE CBC W/AUTO DIFF WBC: CPT | Performed by: INTERNAL MEDICINE

## 2024-03-12 PROCEDURE — 82948 REAGENT STRIP/BLOOD GLUCOSE: CPT

## 2024-03-12 PROCEDURE — 94799 UNLISTED PULMONARY SVC/PX: CPT

## 2024-03-12 PROCEDURE — 25010000002 CEFEPIME PER 500 MG: Performed by: INTERNAL MEDICINE

## 2024-03-12 PROCEDURE — 25010000002 HEPARIN (PORCINE) PER 1000 UNITS: Performed by: INTERNAL MEDICINE

## 2024-03-12 PROCEDURE — 25010000002 LABETALOL 5 MG/ML SOLUTION: Performed by: NURSE PRACTITIONER

## 2024-03-12 PROCEDURE — 25810000003 SODIUM CHLORIDE 0.9 % SOLUTION

## 2024-03-12 PROCEDURE — 85610 PROTHROMBIN TIME: CPT | Performed by: INTERNAL MEDICINE

## 2024-03-12 PROCEDURE — 63710000001 INSULIN DETEMIR PER 5 UNITS: Performed by: NURSE PRACTITIONER

## 2024-03-12 PROCEDURE — 25010000002 HYDRALAZINE PER 20 MG: Performed by: NURSE PRACTITIONER

## 2024-03-12 PROCEDURE — 99291 CRITICAL CARE FIRST HOUR: CPT | Performed by: INTERNAL MEDICINE

## 2024-03-12 PROCEDURE — 85007 BL SMEAR W/DIFF WBC COUNT: CPT | Performed by: INTERNAL MEDICINE

## 2024-03-12 PROCEDURE — 82375 ASSAY CARBOXYHB QUANT: CPT

## 2024-03-12 PROCEDURE — 95819 EEG AWAKE AND ASLEEP: CPT | Performed by: PSYCHIATRY & NEUROLOGY

## 2024-03-12 PROCEDURE — 80202 ASSAY OF VANCOMYCIN: CPT | Performed by: INTERNAL MEDICINE

## 2024-03-12 PROCEDURE — 94003 VENT MGMT INPAT SUBQ DAY: CPT

## 2024-03-12 PROCEDURE — 83050 HGB METHEMOGLOBIN QUAN: CPT

## 2024-03-12 RX ORDER — FUROSEMIDE 10 MG/ML
40 INJECTION INTRAMUSCULAR; INTRAVENOUS ONCE
Status: COMPLETED | OUTPATIENT
Start: 2024-03-12 | End: 2024-03-12

## 2024-03-12 RX ORDER — HYDRALAZINE HYDROCHLORIDE 50 MG/1
100 TABLET, FILM COATED ORAL EVERY 8 HOURS SCHEDULED
Status: DISCONTINUED | OUTPATIENT
Start: 2024-03-12 | End: 2024-04-04 | Stop reason: HOSPADM

## 2024-03-12 RX ORDER — HYDRALAZINE HYDROCHLORIDE 20 MG/ML
40 INJECTION INTRAMUSCULAR; INTRAVENOUS ONCE
Status: COMPLETED | OUTPATIENT
Start: 2024-03-12 | End: 2024-03-12

## 2024-03-12 RX ORDER — CLONIDINE HYDROCHLORIDE 0.2 MG/1
0.2 TABLET ORAL EVERY 8 HOURS SCHEDULED
Status: DISCONTINUED | OUTPATIENT
Start: 2024-03-12 | End: 2024-03-12

## 2024-03-12 RX ADMIN — DEXMEDETOMIDINE HYDROCHLORIDE 0.2 MCG/KG/HR: 400 INJECTION, SOLUTION INTRAVENOUS at 09:36

## 2024-03-12 RX ADMIN — Medication 10 ML: at 20:43

## 2024-03-12 RX ADMIN — 0.12% CHLORHEXIDINE GLUCONATE 15 ML: 1.2 RINSE ORAL at 08:01

## 2024-03-12 RX ADMIN — HEPARIN SODIUM 5000 UNITS: 5000 INJECTION INTRAVENOUS; SUBCUTANEOUS at 13:22

## 2024-03-12 RX ADMIN — DEXMEDETOMIDINE HYDROCHLORIDE 0.9 MCG/KG/HR: 400 INJECTION, SOLUTION INTRAVENOUS at 21:48

## 2024-03-12 RX ADMIN — TIMOLOL MALEATE 1 DROP: 5 SOLUTION/ DROPS OPHTHALMIC at 08:15

## 2024-03-12 RX ADMIN — Medication 10 ML: at 08:06

## 2024-03-12 RX ADMIN — ALBUTEROL SULFATE 2.5 MG: 2.5 SOLUTION RESPIRATORY (INHALATION) at 07:42

## 2024-03-12 RX ADMIN — INSULIN HUMAN 3 UNITS: 100 INJECTION, SOLUTION PARENTERAL at 05:45

## 2024-03-12 RX ADMIN — CLONIDINE HYDROCHLORIDE 0.3 MG: 0.2 TABLET ORAL at 02:02

## 2024-03-12 RX ADMIN — SODIUM ZIRCONIUM CYCLOSILICATE 10 G: 10 POWDER, FOR SUSPENSION ORAL at 21:42

## 2024-03-12 RX ADMIN — HYDRALAZINE HYDROCHLORIDE 50 MG: 50 TABLET, FILM COATED ORAL at 05:45

## 2024-03-12 RX ADMIN — ALBUTEROL SULFATE 2.5 MG: 2.5 SOLUTION RESPIRATORY (INHALATION) at 11:31

## 2024-03-12 RX ADMIN — DEXMEDETOMIDINE HYDROCHLORIDE 0.8 MCG/KG/HR: 400 INJECTION, SOLUTION INTRAVENOUS at 02:38

## 2024-03-12 RX ADMIN — Medication 20 MG: at 08:09

## 2024-03-12 RX ADMIN — BRIMONIDINE TARTRATE 1 DROP: 2 SOLUTION/ DROPS OPHTHALMIC at 16:48

## 2024-03-12 RX ADMIN — TIMOLOL MALEATE 1 DROP: 5 SOLUTION/ DROPS OPHTHALMIC at 20:42

## 2024-03-12 RX ADMIN — VALPROIC ACID 250 MG: 250 SOLUTION ORAL at 13:23

## 2024-03-12 RX ADMIN — INSULIN HUMAN 4 UNITS: 100 INJECTION, SOLUTION PARENTERAL at 13:00

## 2024-03-12 RX ADMIN — INSULIN HUMAN 3 UNITS: 100 INJECTION, SOLUTION PARENTERAL at 18:20

## 2024-03-12 RX ADMIN — BRIMONIDINE TARTRATE 1 DROP: 2 SOLUTION/ DROPS OPHTHALMIC at 08:15

## 2024-03-12 RX ADMIN — 0.12% CHLORHEXIDINE GLUCONATE 15 ML: 1.2 RINSE ORAL at 20:42

## 2024-03-12 RX ADMIN — SODIUM ZIRCONIUM CYCLOSILICATE 10 G: 10 POWDER, FOR SUSPENSION ORAL at 05:45

## 2024-03-12 RX ADMIN — VALPROIC ACID 250 MG: 250 SOLUTION ORAL at 21:43

## 2024-03-12 RX ADMIN — DEXMEDETOMIDINE HYDROCHLORIDE 1.3 MCG/KG/HR: 400 INJECTION, SOLUTION INTRAVENOUS at 14:28

## 2024-03-12 RX ADMIN — ALBUTEROL SULFATE 2.5 MG: 2.5 SOLUTION RESPIRATORY (INHALATION) at 17:27

## 2024-03-12 RX ADMIN — CEFEPIME 2000 MG: 2 INJECTION, POWDER, FOR SOLUTION INTRAVENOUS at 18:18

## 2024-03-12 RX ADMIN — ALBUTEROL SULFATE 2.5 MG: 2.5 SOLUTION RESPIRATORY (INHALATION) at 19:09

## 2024-03-12 RX ADMIN — HYDRALAZINE HYDROCHLORIDE 100 MG: 50 TABLET ORAL at 13:23

## 2024-03-12 RX ADMIN — HYDRALAZINE HYDROCHLORIDE 40 MG: 20 INJECTION INTRAMUSCULAR; INTRAVENOUS at 02:04

## 2024-03-12 RX ADMIN — HEPARIN SODIUM 5000 UNITS: 5000 INJECTION INTRAVENOUS; SUBCUTANEOUS at 21:43

## 2024-03-12 RX ADMIN — BRIMONIDINE TARTRATE 1 DROP: 2 SOLUTION/ DROPS OPHTHALMIC at 20:41

## 2024-03-12 RX ADMIN — VANCOMYCIN HYDROCHLORIDE 1250 MG: 10 INJECTION, POWDER, LYOPHILIZED, FOR SOLUTION INTRAVENOUS at 08:26

## 2024-03-12 RX ADMIN — INSULIN DETEMIR 10 UNITS: 100 INJECTION, SOLUTION SUBCUTANEOUS at 05:46

## 2024-03-12 RX ADMIN — HEPARIN SODIUM 5000 UNITS: 5000 INJECTION INTRAVENOUS; SUBCUTANEOUS at 05:46

## 2024-03-12 RX ADMIN — PANTOPRAZOLE SODIUM 40 MG: 40 INJECTION, POWDER, LYOPHILIZED, FOR SOLUTION INTRAVENOUS at 08:02

## 2024-03-12 RX ADMIN — DEXMEDETOMIDINE HYDROCHLORIDE 1.3 MCG/KG/HR: 400 INJECTION, SOLUTION INTRAVENOUS at 18:07

## 2024-03-12 RX ADMIN — Medication 20 MG: at 20:42

## 2024-03-12 RX ADMIN — FUROSEMIDE 40 MG: 10 INJECTION, SOLUTION INTRAMUSCULAR; INTRAVENOUS at 18:22

## 2024-03-12 RX ADMIN — AMLODIPINE BESYLATE 10 MG: 10 TABLET ORAL at 08:02

## 2024-03-12 RX ADMIN — SODIUM ZIRCONIUM CYCLOSILICATE 10 G: 10 POWDER, FOR SUSPENSION ORAL at 13:23

## 2024-03-12 RX ADMIN — CLONIDINE HYDROCHLORIDE 0.2 MG: 0.2 TABLET ORAL at 05:45

## 2024-03-12 RX ADMIN — Medication 20 MG: at 04:05

## 2024-03-12 RX ADMIN — VALPROIC ACID 250 MG: 250 SOLUTION ORAL at 05:45

## 2024-03-12 RX ADMIN — HYDRALAZINE HYDROCHLORIDE 100 MG: 50 TABLET ORAL at 21:42

## 2024-03-12 RX ADMIN — CEFEPIME 2000 MG: 2 INJECTION, POWDER, FOR SOLUTION INTRAVENOUS at 05:46

## 2024-03-12 NOTE — PROGRESS NOTES
Critical Care Note     LOS: 2 days   Patient Care Team:  Deann Cao MD as PCP - General (Internal Medicine)    Chief Complaint/Reason for visit:    Chief Complaint   Patient presents with    Aspiration     Acute respiratory failure  Aspiration pneumonia  Chronic congestive heart failure with reduced EF  Chronic kidney disease  Diabetes mellitus type 2  Dementia with delirium  Hypertension  Chronic anemia    Subjective     Interval History:     Patient was hospitalized 3/10 with an aspiration event and pneumonia.  He deteriorated the  night of admission requiring intubation and mechanical ventilation.   He is currently on a rate of 18 tidal volume 420 PEEP of 5 and 35% FiO2.  Nursing notes moderate secretions.  Propofol was changed to Precedex and fentanyl continued for sedation.  He remains afebrile.  He is in sinus rhythm.  Urine output 4.7 L overnight.   He is also legally blind.    Review of Systems:    All systems were reviewed and negative except as noted in subjective.    Medical history, surgical history, social history, family history reviewed    Objective     Intake/Output:    Intake/Output Summary (Last 24 hours) at 3/12/2024 1036  Last data filed at 3/12/2024 0924  Gross per 24 hour   Intake 2583 ml   Output 3100 ml   Net -517 ml       Nutrition:  NPO Diet NPO Type: Strict NPO    Infusions:  dexmedetomidine, 0.2-1.5 mcg/kg/hr (Order-Specific), Last Rate: 0.4 mcg/kg/hr (03/12/24 1013)  fentanyl 10 mcg/mL,  mcg/hr, Last Rate: Stopped (03/12/24 0923)  norepinephrine, 0.02-0.3 mcg/kg/min  Pharmacy to dose vancomycin,         Mechanical Ventilator Settings:            Resp Rate (Set): (S) 14     FiO2 (%): 35 %  PEEP/CPAP (cm H2O): 5 cm H20    Minute Ventilation (L/min) (Obs): 6.47 L/min  Resp Rate (Observed) Vent: 19  I:E Ratio (Set): 1:3.29  I:E Ratio (Obs): 1:2.6    PIP Observed (cm H2O): 22 cm H2O  Plateau Pressure (cm H2O): 21 cm H2O    Telemetry: Sinus rhythm             Vital  "Signs  Blood pressure 163/71, pulse 76, temperature 97.3 °F (36.3 °C), temperature source Bladder, resp. rate 18, height 151.9 cm (59.8\"), weight 93.7 kg (206 lb 9.1 oz), SpO2 94%.    Physical Exam:  General Appearance:  Old appearing gentleman sedated, supine   Head:  No visible trauma   Eyes:          Conjunctiva pale, both pupils are distorted   Ears:     Throat: Orally intubated, nasogastric tube   Neck: Trachea midline, left IJ deep line   Back:      Lungs:   Respirations are bilateral, scattered expiratory rhonchi, slightly improved    Heart:  Regular rhythm, S1, S2 auscultated   Abdomen:   Flat, bowel sounds present, soft   Rectal:   Deferred   Extremities: Left forefoot amputation healed.  No pretibial edema   Pulses: Palpable radial pulses but I do not feel DP pulses, feet feel warm to touch   Skin: Multiple wounds currently dressed   Lymph nodes: No cervical adenopathy   Neurologic: Sedated      Results Review:     I reviewed the patient's new clinical results.   Results from last 7 days   Lab Units 03/12/24  0406 03/11/24  1806 03/11/24  1228 03/11/24  0424 03/10/24  1241 03/10/24  0944   SODIUM mmol/L 141  --  146* 143   < > 139   POTASSIUM mmol/L 5.1 5.7* 6.0* 5.7*   < > 7.4*   CHLORIDE mmol/L 110*  --  115* 111*   < > 111*   CO2 mmol/L 23.0  --  23.0 23.0   < > 22.0   BUN mg/dL 48*  --  57* 64*   < > 63*   CREATININE mg/dL 1.87*  --  2.02* 2.29*   < > 1.78*   CALCIUM mg/dL 8.5*  --  8.3* 8.8   < > 8.8   BILIRUBIN mg/dL  --   --   --  0.2  --  <0.2   ALK PHOS U/L  --   --   --  74  --  89   ALT (SGPT) U/L  --   --   --  46*  --  46*   AST (SGOT) U/L  --   --   --  28  --  32   GLUCOSE mg/dL 217*  --  95 103*   < > 147*    < > = values in this interval not displayed.     Results from last 7 days   Lab Units 03/12/24  0406 03/11/24  1806 03/11/24  1228 03/11/24  0424 03/11/24  0027   WBC 10*3/mm3 9.22  --   --  6.19 4.95   HEMOGLOBIN g/dL 7.9* 8.0* 7.1* 7.2* 7.5*   HEMATOCRIT % 25.9* 25.3* 23.0* 23.5* " 24.5*   PLATELETS 10*3/mm3 154  --   --  146 154   MONOCYTES % %  --   --   --  1.0*  --    EOSINOPHIL % %  --   --   --  0.0*  --      Results from last 7 days   Lab Units 03/12/24  0412   PH, ARTERIAL pH units 7.404   PO2 ART mm Hg 72.2*   PCO2, ARTERIAL mm Hg 38.6   HCO3 ART mmol/L 24.1     Lab Results   Component Value Date    BLOODCX No growth at 2 days 03/10/2024     Lab Results   Component Value Date    URINECX 30,000-40,000 CFU/mL Normal Urogenital Preethi 05/06/2017       I reviewed the patient's new imaging including images and reports.    XR CHEST 1 VW    Date of Exam: 3/12/2024 4:30 AM EDT    Indication: Intubated Patient    Comparison:  3/11/2024    Findings:  Endotracheal tube, left internal jugular central venous catheter and nasogastric are again seen.    Cardiac size is similar to prior exam. Similar hazy opacities throughout the right greater than left lungs. Similar small layering pleural effusions. No substantial pneumothorax. No evidence of acute osseous abnormalities. Visualized upper abdomen is  unremarkable.   Impression:     Impression:  Similar hazy opacities in the right greater than left lungs and small layering bilateral pleural effusions.      Electronically Signed: Anil Krishnamurthy MD   3/12/2024 8:32 AM EDT         FINDINGS: CTA of the chest, March 10, 2024  No significant focal filling defects are identified to indicate the presence of pulmonary embolism.     There are extensive groundglass appearing multifocal infiltrates throughout the lungs bilaterally with interstitial changes. Many of these infiltrates or rounded in morphology. More pronounced consolidations are seen in the lung bases. Small bilateral   pleural effusions are noted. The findings suggest developing atypical/viral infection or multifocal pneumonia and may indicate changes of Covid 19 infection.     Mildly prominent hilar and mediastinal lymph nodes are noted which are likely reactive. No significant axillary  lymphadenopathy is seen. The heart and great vessels are stable. Mild coronary artery calcifications are noted. The thyroid gland is stable.   The esophagus is unremarkable.     The limited evaluation of the upper abdomen demonstrates no evidence for acute abnormality.     No acute osseous abnormalities are observed.     IMPRESSION:  1.No evidence for pulmonary embolism.  2.Evidence for ongoing atypical/viral infection versus multifocal pneumonia and potential changes of Covid 19 infection. Recommend correlation for signs or symptoms of acute infection and follow-up to ensure improvement/resolution.           Electronically Signed: Renny Canales MD    3/10/2024 10:28 AM EDT     Interpretation Summary echocardiogram January 15, 2024         Left ventricular ejection fraction appears to be 56 - 60%.    Left ventricular wall thickness is consistent with hypertrophy.    Estimated right ventricular systolic pressure from tricuspid regurgitation is mildly elevated (35-45 mmHg).    There is a small (1-2cm) pericardial effusion.    No evidence for pericardial tamponade    All medications reviewed.   albuterol, 2.5 mg, Nebulization, 4x Daily - RT  amLODIPine, 10 mg, Nasogastric, Q24H  brimonidine, 1 drop, Both Eyes, TID   And  timolol, 1 drop, Both Eyes, BID  cefepime, 2,000 mg, Intravenous, Q8H  chlorhexidine, 15 mL, Mouth/Throat, Q12H  cloNIDine, 0.2 mg, Oral, Q8H  sennosides, 10 mL, Nasogastric, BID   And  docusate sodium, 100 mg, Nasogastric, BID  heparin (porcine), 5,000 Units, Subcutaneous, Q8H  hydrALAZINE, 50 mg, Nasogastric, Q8H  insulin detemir, 10 Units, Subcutaneous, Daily  insulin regular, 2-7 Units, Subcutaneous, Q6H  pantoprazole, 40 mg, Intravenous, Q24H  sodium chloride, 10 mL, Intravenous, Q12H  sodium zirconium cyclosilicate, 10 g, Nasogastric, TID  Valproic Acid, 250 mg, Nasogastric, Q8H  vancomycin (dosing per levels), , Does not apply, Daily          Assessment & Plan       Healthcare-associated  pneumonia    Essential hypertension    Neurocognitive disorder    AMS (altered mental status)    Dementia    Dysphagia    Type 2 diabetes mellitus with diabetic chronic kidney disease    Acute respiratory failure with hypoxia    Acute kidney injury superimposed on chronic kidney disease    Hyperkalemia    Bradycardia    Hypothermia    Debilitated 68-year-old gentleman with diabetes mellitus complicated by blindness, osteomyelitis, chronic kidney disease, underlying dementia with psychosis, dependent for mobility, bathing and dressing, chronic anemia, heart failure with normal EF who is now admitted for his third admission in the last 2.5 months for an aspiration event with aspiration pneumonia.  On his previous admission a modified barium swallow did reveal moderate oral and mild pharyngeal dysphagia February 5, 2024.  Respiratory status deteriorated overnight on March 10 and he required intubation.       #1 acute respiratory failure, aspiration pneumonia he is currently on a rate of 18 tidal volume 420 PEEP of 5 and 35% FiO2.  ABG this am, pH of 7.40, CO2 38, O2 72.  FiO2   Gram stain shows yeast and many white cells but no bacteria.  MRSA PCR is positive.  Respiratory panel PCR is negative.  Admission procalcitonin is elevated at 5.72.  In February he was treated empirically for pneumonia with cefepime, linezolid.  He did not have positive cultures.  Current cultures remain negative.  He is on vancomycin and cefepime.  Today his vancomycin level was 14.2 and he will need redosing.  Has a known history of dysphagia with an abnormal modified barium swallow in February of this year.  He apparently was a smoker up until nursing home admission 2 years ago.  His CT scan does not reveal advanced COPD.  He was not on inhalers, or oxygen as an outpatient.  Oxygenation significantly improved in the last 24 hours.  Precedex was started 0.2 and fentanyl has weaned to 50 mics per hour.  Blood pressure has increased with  weaning sedation.  Patient still does not awaken and follow commands    -Attempt to decrease rate by 2 every  hour until he is spontaneously overbreathing the ventilator  -Wean sedation  -If I can keep him calm off sedation except Precedex will attempt a spontaneous breathing trial  -Follow-up final cultures  -Continue vancomycin, cefepime  -Continue bronchodilators  -Discussed permanent feeding tube placement with family given his recurrent aspiration pneumonias    #2 diabetes mellitus with chronic kidney disease, blindness, diabetic foot ulcers and osteomyelitis requiring toe amputation.  Blood glucoses are running 220-240 with initiation of tube feeding.  Nasogastric tube was apparently very difficult to place and it took 10 attempts.  Today his creatinine is 1.87, improved.  Potassium was elevated at 6.8 and he has received several doses of Lokelma.  Most recent potassium is 5.1.  February 11 his creatinine was 1.63, which I suspect is baseline.  He did get IV contrast for CTA of the chest.  Renal ultrasound reveals right kidney measuring 12.1 cm, left measuring 11.3 cm, no hydronephrosis, normal corticomedullary differentiation.    -Continue Lokelma as needed  -Monitor potassium  -Nephrology following  -Sliding scale insulin  -Story catheter for strict ins and outs  -Diuretics as needed  -Continue tube feeding    #3 dementia with psychosis, nursing home resident for 2 years.  Currently he is in a wheelchair needs assist to transfer, needs assist for bathing and dressing.  Home medications include Seroquel, Depakote, Abilify, Prozac, trazodone.  He did fall in 2023 and sustained a neck injury without fracture.  He had swelling in the prevertebral space C2-C5.  He was placed in a cervical collar for ligamentous injuries.  Patient had been removing the collar on his own and cervical collar was removed.  Decision was made to leave the collar off as he had no acute fracture or dislocation.    -Continue  Depakote  -Will restart some of his other psychiatric medications as we decrease sedation    #4 hypertension, congestive heart failure currently blood pressure is running 170.  He does take amlodipine, carvedilol, clonidine, hydralazine, torsemide as an outpatient.  Echocardiogram in January 2024 revealed an EF of 56 to 60% with some LVH and a small 1 to 2 cm pericardial effusion without evidence of tamponade.  He had some mild MR. He is currently on Precedex which can cause bradycardia so I am having to hold carvedilol and clonidine.    -Increase hydralazine to 100 mg every 8 hours  -Continue amlodipine  -Hold clonidine, carvedilol for now while he is on Precedex          VTE Prophylaxis: subcutaneous heparin    Stress Ulcer Prophylaxis: Protonix    Bibi Cervantes MD  03/12/24  10:36 EDT      Time: Critical care 40 min  I personally provided care to this critically ill patient as documented above.  Critical care time does not include time spent on separately billed procedures.  None of my critical care time was concurrent with other critical care providers.

## 2024-03-12 NOTE — PROGRESS NOTES
Pharmacy Consult-Vancomycin Dosing  Omkar Nava is a  68 y.o. male receiving vancomycin therapy.     Indication:  Pneumonia  Consulting Provider: Intensivist  ID Consult:  No    Goal AUC: 400 - 600 mg/L*hr    Current Antimicrobial Therapy  Anti-Infectives (From admission, onward)      Ordered     Dose/Rate Route Frequency Start Stop    03/12/24 0704  vancomycin 1250 mg/250 mL 0.9% NS IVPB ([facility])        Ordering Provider: Sarkis Shelton PharmD    1,250 mg  over 75 Minutes Intravenous Once 03/12/24 0800 03/12/24 0941    03/11/24 1033  vancomycin 1000 mg/250 mL 0.9% NS IVPB ([facility])        Ordering Provider: Antolin Guaman PharmD    1,000 mg  over 60 Minutes Intravenous Once 03/11/24 1130 03/11/24 1441    03/10/24 1254  cefepime 2000 mg IVPB in 100 mL NS ([facility])        Ordering Provider: Kurtis Cedeño MD    2,000 mg  over 4 Hours Intravenous Every 8 Hours 03/10/24 2200 03/15/24 2159    03/10/24 1343  vancomycin (dosing per levels)        Ordering Provider: Sarkis Easley RPH     Does not apply Daily 03/10/24 1430 03/15/24 0859    03/10/24 1404  vancomycin IVPB 1750 mg in 0.9% Sodium Chloride (premix) 500 mL        Ordering Provider: Sarkis Easely RPH    1,750 mg  285.7 mL/hr over 105 Minutes Intravenous Once 03/10/24 1430 03/10/24 1854    03/10/24 1254  cefepime 2000 mg IVPB in 100 mL NS ([facility])        Ordering Provider: Kurtis Cedeño MD    2,000 mg  over 30 Minutes Intravenous Once 03/10/24 1400 03/10/24 1413    03/10/24 1254  Pharmacy to dose vancomycin        Ordering Provider: Kurtis Cedeño MD     Does not apply Continuous PRN 03/10/24 1253 03/15/24 1252    03/10/24 0934  piperacillin-tazobactam (ZOSYN) 3.375 g IVPB in 100 mL NS [facility]BP ([facility])        Ordering Provider: Omkar Seth MD    3.375 g Intravenous Once 03/10/24 0950 03/10/24 1109          Allergies  Allergies as of 03/10/2024    (No Known Allergies)     Labs    Results from last 7 days   Lab Units  "03/12/24  0406 03/11/24  1228 03/11/24  0424   BUN mg/dL 48* 57* 64*   CREATININE mg/dL 1.87* 2.02* 2.29*     Results from last 7 days   Lab Units 03/12/24  0406 03/11/24  0424 03/11/24  0027   WBC 10*3/mm3 9.22 6.19 4.95     Evaluation of Dosing     Is Patient on Dialysis or Renal Replacement: No    Ht - 151.9 cm (59.8\")  Wt - 93.7 kg (206 lb 9.1 oz)    Intake & Output (last 2 days)         03/10 0701 03/11 0700 03/11 0701 03/12 0700 03/12 0701 03/13 0700    I.V. (mL/kg) 1378.5 (15.3) 1497.2 (16) 14.7 (0.2)    Other  120 40    NG/GT 60 771 186    IV Piggyback 550 704.9 220.7    Total Intake(mL/kg) 1988.5 (22.1) 3093.1 (33) 461.4 (4.9)    Urine (mL/kg/hr) 1325 2975 (1.3) 420 (1.1)    Emesis/NG output 0      Stool 0 0     Total Output 1325 2975 420    Net +663.5 +118.1 +41.4           Stool Unmeasured Occurrence 4 x 5 x     Emesis Unmeasured Occurrence 2 x            Microbiology and Radiology  Microbiology Results (last 10 days)       Procedure Component Value - Date/Time    Respiratory Culture - Sputum, NT Suction [[id number]] Collected: 03/11/24 0104    Lab Status: Preliminary result Specimen: Sputum from NT Suction Updated: 03/12/24 0956     Respiratory Culture Scant growth (1+) The culture consists of normal respiratory korin. This is a preliminary report; final report to follow.     Gram Stain Many (4+) WBCs per low power field      Rare (1+) Epithelial cells per low power field      Rare (1+) Yeast    MRSA Screen, PCR (Inpatient) - Swab, Nares [[id number]]  (Abnormal) Collected: 03/10/24 1415    Lab Status: Final result Specimen: Swab from Nares Updated: 03/10/24 1621     MRSA PCR Positive    Narrative:      The negative predictive value of this diagnostic test is high and should only be used to consider de-escalating anti-MRSA therapy. A positive result may indicate colonization with MRSA and must be correlated clinically.    Respiratory Panel PCR w/COVID-19(SARS-CoV-2) GIANNI/SIENNA/ANNA/PAD/COR/ASHIA In-House, NP " Swab in UTM/VTM, 2 HR TAT - Swab, Nasopharynx [[id number]]  (Normal) Collected: 03/10/24 1415    Lab Status: Final result Specimen: Swab from Nasopharynx Updated: 03/10/24 1518     ADENOVIRUS, PCR Not Detected     Coronavirus 229E Not Detected     Coronavirus HKU1 Not Detected     Coronavirus NL63 Not Detected     Coronavirus OC43 Not Detected     COVID19 Not Detected     Human Metapneumovirus Not Detected     Human Rhinovirus/Enterovirus Not Detected     Influenza A PCR Not Detected     Influenza B PCR Not Detected     Parainfluenza Virus 1 Not Detected     Parainfluenza Virus 2 Not Detected     Parainfluenza Virus 3 Not Detected     Parainfluenza Virus 4 Not Detected     RSV, PCR Not Detected     Bordetella pertussis pcr Not Detected     Bordetella parapertussis PCR Not Detected     Chlamydophila pneumoniae PCR Not Detected     Mycoplasma pneumo by PCR Not Detected    Narrative:      In the setting of a positive respiratory panel with a viral infection PLUS a negative procalcitonin without other underlying concern for bacterial infection, consider observing off antibiotics or discontinuation of antibiotics and continue supportive care. If the respiratory panel is positive for atypical bacterial infection (Bordetella pertussis, Chlamydophila pneumoniae, or Mycoplasma pneumoniae), consider antibiotic de-escalation to target atypical bacterial infection.    Blood Culture - Blood, Hand, Right [[id number]]  (Normal) Collected: 03/10/24 0944    Lab Status: Preliminary result Specimen: Blood from Hand, Right Updated: 03/12/24 1000     Blood Culture No growth at 2 days    Blood Culture - Blood, Arm, Right [[id number]]  (Normal) Collected: 03/10/24 0924    Lab Status: Preliminary result Specimen: Blood from Arm, Right Updated: 03/12/24 1000     Blood Culture No growth at 2 days    COVID-19 and FLU A/B PCR, 1 HR TAT - Swab, Nasopharynx [[id number]]  (Normal) Collected: 03/10/24 0923    Lab Status: Final result Specimen: Swab  from Nasopharynx Updated: 03/10/24 1017     COVID19 Not Detected     Influenza A PCR Not Detected     Influenza B PCR Not Detected    Narrative:      Fact sheet for providers: https://www.fda.gov/media/051343/download    Fact sheet for patients: https://www.fda.gov/media/075503/download    Test performed by PCR.            Reported Vancomycin Levels    Results from last 7 days   Lab Units 03/12/24  0406 03/11/24  0424   VANCOMYCIN RM mcg/mL 14.20 14.90                 Assessment/Plan:  Noted renal function improvement; UOP has increased over last 24 hours with slightly decreasing SCr.  Will continue pulse dosing for one more day with noted history of T2DM and CKD.  Vancomycin 1250mg IV x 1.  Level check 3/13 with AM labs.  Discussed linezolid therapy with MD on 3/11; will not change to this due to significant psych medications in history and avoiding interaction potential with resumption.  Will adjust cefepime to 2g IV q12h.  Pharmacy will continue to follow and adjust dose based on renal function, drug levels, and clinical status.    Thanks,  Sarkis Shelton, PharmD, BCPS, BCCCP  03/12/24  10:59 EDT

## 2024-03-12 NOTE — PROGRESS NOTES
Multidisciplinary Rounds EN Review Note    Patient Name: Omkar Nava  Date of Encounter: 24 14:29 EDT  MRN: [id number]  Admission date: 3/10/2024    Reason for visit: EN review . RD to continue to follow per protocol.     EMR reviewed   Medication reviewed  Labs reviewed     Estimated/Assessed Needs (3/11):      Energy: 1550 kcal while on vent  Protein: 85 g with current renal fx  Fluids: per clinical status    Additional Information Obtained:   Pt tolerating EN initiated yesterday, now at goal. Weaned off propofol, now on precedex. Renal fx labs remain elevated but improved from yesterday, K now WNL. BG increased with EN now infusing, insulin changes made. Bowels moving.     Current diet: NPO Diet NPO Type: Strict NPO    EN: [facility]  Goal Rate: 40 ml/hr    Water Flushes: 10 ml/hr  Modular: None  Route: NG  Tube: Large bore    At goal over: 20Hrs/day    Rx will supply:   Goal Volume 800 mL/day     Flush Volume 200 mL/day     Energy 1600 Kcal/day 103 % Est Need   Protein 73 g/day *85 % Est Need   Fiber 0 g/day     Water in   mL     Total Water 776 mL     Meet DRI No      *plan to increase protein once renal fx labs stable  --------------------------------------------------------------------------  Product/Rate verified at bedside: Yes  Infusing Rate at time of visit: 40 ml/hr    Average Delivery from [facility]:(while advancing)  Volume 261 mL/day   % Goal Vol.   Flush Volume 120 mL/day     Energy  Kcal/day  % Est Need   Protein  g/day  % Est Need   Fiber  g/day     Water in  EN  mL     Total Water  mL     Meet DRI No          Intervention:  Follow treatment plan  Care plan reviewed    Continue current EN regimen per order / as tolerated.     Follow up:   Per protocol      Chuyita Moreno RD, [facility]  14:29 EDT  Time: 15min

## 2024-03-12 NOTE — PROGRESS NOTES
"   LOS: 2 days    Patient Care Team:  Deann Cao MD as PCP - General (Internal Medicine)    Consulted for: Acute on chronic renal failure, hyperkalemia  68-year-old CKD stage III admitted with shortness of breath, choking of food at NH, admitted with JAYY, hyperkalemia, acute respiratory failure now intubated, anemia.  Later developed oliguria with increasing BUN/creatinine.  Patient was treated with Lokelma, bicarb, dextrose, insulin, calcium gluconate.  Subjective     Interval History:     Improvement is noted in urine output.  Electrolytes improved, renal function improving at this time.  Patient remain in ICU setting.  Critically ill.  Other physician notes seen.    Review of Systems:    Review of systems could not be obtained due to  patient intubated. emergent nature of case.    Objective     Vital Sign Min/Max for last 24 hours  Temp  Min: 97.3 °F (36.3 °C)  Max: 98 °F (36.7 °C)   BP  Min: 142/64  Max: 184/88   Pulse  Min: 66  Max: 85   Resp  Min: 18  Max: 18   SpO2  Min: 91 %  Max: 100 %   Flow (L/min)  Min: 35  Max: 35   Weight  Min: 93.7 kg (206 lb 9.1 oz)  Max: 93.7 kg (206 lb 9.1 oz)     Flowsheet Rows      Flowsheet Row First Filed Value   Admission Height 151.9 cm (59.8\") Documented at 03/10/2024 0910   Admission Weight 84.8 kg (187 lb) Documented at 03/10/2024 0910            I/O this shift:  In: 461.4 [I.V.:14.7; Other:40; NG/GT:186; IV Piggyback:220.7]  Out: 420 [Urine:420]  I/O last 3 completed shifts:  In: 4215.4 [I.V.:2559.5; Other:120; NG/GT:831; IV Piggyback:704.9]  Out: 3700 [Urine:3700]    Physical Exam:  General Appearance: Intubated on ventilator  Oral intubated  Eyes: PER.  Neck: Restricted due to intubation no JVD  Lungs: Ventilator sound heard, equal chest movement, nonlabored.  Heart: No gallop, murmur, rub, RRR.  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  Area of abrasions and healing noted.   Story catheter in place.    WBC WBC   Date Value Ref Range Status   03/12/2024 9.22 3.40 - 10.80 10*3/mm3 Final   03/11/2024 6.19 3.40 - 10.80 10*3/mm3 Final   03/11/2024 4.95 3.40 - 10.80 10*3/mm3 Final   03/10/2024 4.43 3.40 - 10.80 10*3/mm3 Final      HGB Hemoglobin   Date Value Ref Range Status   03/12/2024 7.9 (L) 13.0 - 17.7 g/dL Final   03/11/2024 8.0 (L) 13.0 - 17.7 g/dL Final   03/11/2024 7.1 (L) 13.0 - 17.7 g/dL Final   03/11/2024 7.2 (L) 13.0 - 17.7 g/dL Final   03/11/2024 7.5 (L) 13.0 - 17.7 g/dL Final   03/10/2024 8.4 (L) 13.0 - 17.7 g/dL Final      HCT Hematocrit   Date Value Ref Range Status   03/12/2024 25.9 (L) 37.5 - 51.0 % Final   03/11/2024 25.3 (L) 37.5 - 51.0 % Final   03/11/2024 23.0 (L) 37.5 - 51.0 % Final   03/11/2024 23.5 (L) 37.5 - 51.0 % Final   03/11/2024 24.5 (L) 37.5 - 51.0 % Final   03/10/2024 27.8 (L) 37.5 - 51.0 % Final      Platlets No results found for: \"LABPLAT\"   MCV MCV   Date Value Ref Range Status   03/12/2024 94.5 79.0 - 97.0 fL Final   03/11/2024 93.3 79.0 - 97.0 fL Final   03/11/2024 94.2 79.0 - 97.0 fL Final   03/10/2024 96.5 79.0 - 97.0 fL Final          Sodium Sodium   Date Value Ref Range Status   03/12/2024 141 136 - 145 mmol/L Final   03/11/2024 146 (H) 136 - 145 mmol/L Final   03/11/2024 143 136 - 145 mmol/L Final   03/11/2024 146 (H) 136 - 145 mmol/L Final   03/10/2024 140 136 - 145 mmol/L Final   03/10/2024 143 136 - 145 mmol/L Final   03/10/2024 139 136 - 145 mmol/L Final      Potassium Potassium   Date Value Ref Range Status   03/12/2024 5.1 3.5 - 5.2 mmol/L Final   03/11/2024 5.7 (H) 3.5 - 5.2 mmol/L Final   03/11/2024 6.0 (H) 3.5 - 5.2 mmol/L Final   03/11/2024 5.7 (H) 3.5 - 5.2 mmol/L Final   03/11/2024 6.8 (C) 3.5 - 5.2 mmol/L Final   03/10/2024 6.7 (C) 3.5 - 5.2 mmol/L Final   03/10/2024 6.7 (C) 3.5 - 5.2 mmol/L Final   03/10/2024 7.4 (C) 3.5 - 5.2 mmol/L Final      Chloride Chloride " "  Date Value Ref Range Status   03/12/2024 110 (H) 98 - 107 mmol/L Final   03/11/2024 115 (H) 98 - 107 mmol/L Final   03/11/2024 111 (H) 98 - 107 mmol/L Final   03/11/2024 115 (H) 98 - 107 mmol/L Final   03/10/2024 112 (H) 98 - 107 mmol/L Final   03/10/2024 115 (H) 98 - 107 mmol/L Final   03/10/2024 111 (H) 98 - 107 mmol/L Final      CO2 CO2   Date Value Ref Range Status   03/12/2024 23.0 22.0 - 29.0 mmol/L Final   03/11/2024 23.0 22.0 - 29.0 mmol/L Final   03/11/2024 23.0 22.0 - 29.0 mmol/L Final   03/11/2024 23.0 22.0 - 29.0 mmol/L Final   03/10/2024 20.0 (L) 22.0 - 29.0 mmol/L Final   03/10/2024 21.0 (L) 22.0 - 29.0 mmol/L Final   03/10/2024 22.0 22.0 - 29.0 mmol/L Final      BUN BUN   Date Value Ref Range Status   03/12/2024 48 (H) 8 - 23 mg/dL Final   03/11/2024 57 (H) 8 - 23 mg/dL Final   03/11/2024 64 (H) 8 - 23 mg/dL Final   03/11/2024 63 (H) 8 - 23 mg/dL Final   03/10/2024 63 (H) 8 - 23 mg/dL Final   03/10/2024 62 (H) 8 - 23 mg/dL Final   03/10/2024 63 (H) 8 - 23 mg/dL Final      Creatinine Creatinine   Date Value Ref Range Status   03/12/2024 1.87 (H) 0.76 - 1.27 mg/dL Final   03/11/2024 2.02 (H) 0.76 - 1.27 mg/dL Final   03/11/2024 2.29 (H) 0.76 - 1.27 mg/dL Final   03/11/2024 2.09 (H) 0.76 - 1.27 mg/dL Final   03/10/2024 1.96 (H) 0.76 - 1.27 mg/dL Final   03/10/2024 1.93 (H) 0.76 - 1.27 mg/dL Final   03/10/2024 1.78 (H) 0.76 - 1.27 mg/dL Final   03/10/2024 2.10 (A) 0.60 - 1.30 mg/dL Final   03/10/2024 2.10 (H) 0.60 - 1.30 mg/dL Final     Comment:     Serial Number: [id number]:  [id number]      Calcium Calcium   Date Value Ref Range Status   03/12/2024 8.5 (L) 8.6 - 10.5 mg/dL Final   03/11/2024 8.3 (L) 8.6 - 10.5 mg/dL Final   03/11/2024 8.8 8.6 - 10.5 mg/dL Final   03/11/2024 8.9 8.6 - 10.5 mg/dL Final   03/10/2024 8.8 8.6 - 10.5 mg/dL Final   03/10/2024 8.9 8.6 - 10.5 mg/dL Final   03/10/2024 8.8 8.6 - 10.5 mg/dL Final      PO4 No results found for: \"CAPO4\"   Albumin Albumin   Date Value Ref Range " "Status   03/11/2024 3.4 (L) 3.5 - 5.2 g/dL Final   03/10/2024 3.8 3.5 - 5.2 g/dL Final      Magnesium Magnesium   Date Value Ref Range Status   03/11/2024 2.4 1.6 - 2.4 mg/dL Final      Uric Acid No results found for: \"URICACID\"        Results Review:     I reviewed the patient's new clinical results.    albuterol, 2.5 mg, Nebulization, 4x Daily - RT  amLODIPine, 10 mg, Nasogastric, Q24H  brimonidine, 1 drop, Both Eyes, TID   And  timolol, 1 drop, Both Eyes, BID  cefepime, 2,000 mg, Intravenous, Q8H  chlorhexidine, 15 mL, Mouth/Throat, Q12H  sennosides, 10 mL, Nasogastric, BID   And  docusate sodium, 100 mg, Nasogastric, BID  heparin (porcine), 5,000 Units, Subcutaneous, Q8H  hydrALAZINE, 100 mg, Nasogastric, Q8H  insulin detemir, 10 Units, Subcutaneous, Daily  insulin regular, 2-7 Units, Subcutaneous, Q6H  pantoprazole, 40 mg, Intravenous, Q24H  sodium chloride, 10 mL, Intravenous, Q12H  sodium zirconium cyclosilicate, 10 g, Nasogastric, TID  Valproic Acid, 250 mg, Nasogastric, Q8H  vancomycin (dosing per levels), , Does not apply, Daily      dexmedetomidine, 0.2-1.5 mcg/kg/hr (Order-Specific), Last Rate: 0.4 mcg/kg/hr (03/12/24 1013)  fentanyl 10 mcg/mL,  mcg/hr, Last Rate: Stopped (03/12/24 0923)  norepinephrine, 0.02-0.3 mcg/kg/min  Pharmacy to dose vancomycin,         Medication Review: Reviewed    Assessment & Plan       Healthcare-associated pneumonia    Essential hypertension    Neurocognitive disorder    AMS (altered mental status)    Dementia    Dysphagia    Type 2 diabetes mellitus with diabetic chronic kidney disease    Acute respiratory failure with hypoxia    Acute kidney injury superimposed on chronic kidney disease    Hyperkalemia    Bradycardia    Hypothermia    1.  Acute on chronic renal failure CKD stage III.  Acute rise in creatinine with recent event.  Decreased renal perfusion, hypoxia, IV contrast.  2.  Hyperkalemia: Multifactorial due to JAYY, respiratory failure, acidosis.  3.  CKD " stage III.  Recent creatinine 1.68.  CT abdomen showed normal-sized kidneys with no stones or hydronephrosis.  4.  Respiratory failure: On ventilator.  CT angio negative for pulmonary embolism  5.  Essential hypertension.  6.  Altered mental status.  7.  Bradycardia.  8.  Dementia.     Recommendations:  Oliguric to nonoliguric with little improvement renal function and improvement in electrolytes.  Avoid nephrotoxic medication  Diuretics ordered.  Keep MAP greater than 65 mmHg  Check labs and volume.  Daily evaluation for renal replacement therapy will be done.  No dialysis needed.  Adjust medication for the new GFR  High risk complex patient multiple medical problems.    Hailey Alexis MD  03/12/24  10:57 EDT

## 2024-03-13 ENCOUNTER — APPOINTMENT (OUTPATIENT)
Dept: GENERAL RADIOLOGY | Facility: HOSPITAL | Age: 69
End: 2024-03-13
Payer: MEDICARE

## 2024-03-13 LAB
ALBUMIN SERPL-MCNC: 3.3 G/DL (ref 3.5–5.2)
ANION GAP SERPL CALCULATED.3IONS-SCNC: 9 MMOL/L (ref 5–15)
ARTERIAL PATENCY WRIST A: POSITIVE
ATMOSPHERIC PRESS: ABNORMAL MM[HG]
BASE EXCESS BLDA CALC-SCNC: 1.7 MMOL/L (ref 0–2)
BDY SITE: ABNORMAL
BODY TEMPERATURE: 37
BUN SERPL-MCNC: 35 MG/DL (ref 8–23)
BUN/CREAT SERPL: 23.8 (ref 7–25)
CALCIUM SPEC-SCNC: 8.9 MG/DL (ref 8.6–10.5)
CHLORIDE SERPL-SCNC: 109 MMOL/L (ref 98–107)
CO2 BLDA-SCNC: 27.4 MMOL/L (ref 22–33)
CO2 SERPL-SCNC: 24 MMOL/L (ref 22–29)
COHGB MFR BLD: 1.7 % (ref 0–2)
CREAT SERPL-MCNC: 1.47 MG/DL (ref 0.76–1.27)
EGFRCR SERPLBLD CKD-EPI 2021: 52.3 ML/MIN/1.73
EPAP: 0
GLUCOSE BLDC GLUCOMTR-MCNC: 168 MG/DL (ref 70–130)
GLUCOSE BLDC GLUCOMTR-MCNC: 209 MG/DL (ref 70–130)
GLUCOSE BLDC GLUCOMTR-MCNC: 97 MG/DL (ref 70–130)
GLUCOSE SERPL-MCNC: 213 MG/DL (ref 65–99)
HCO3 BLDA-SCNC: 26.2 MMOL/L (ref 20–26)
HCT VFR BLD CALC: 25.1 % (ref 38–51)
HGB BLDA-MCNC: 8.2 G/DL (ref 13.5–17.5)
INHALED O2 CONCENTRATION: 50 %
IPAP: 0
METHGB BLD QL: 0.2 % (ref 0–1.5)
MODALITY: ABNORMAL
MYOGLOBIN UR-MCNC: 2 NG/ML (ref 0–13)
OXYHGB MFR BLDV: 91.4 % (ref 94–99)
PAW @ PEAK INSP FLOW SETTING VENT: 0 CMH2O
PCO2 BLDA: 40.1 MM HG (ref 35–45)
PCO2 TEMP ADJ BLD: 40.1 MM HG (ref 35–48)
PEEP RESPIRATORY: 5 CM[H2O]
PH BLDA: 7.42 PH UNITS (ref 7.35–7.45)
PH, TEMP CORRECTED: 7.42 PH UNITS
PHOSPHATE SERPL-MCNC: 2.8 MG/DL (ref 2.5–4.5)
PO2 BLDA: 65 MM HG (ref 83–108)
PO2 TEMP ADJ BLD: 65 MM HG (ref 83–108)
POTASSIUM SERPL-SCNC: 4 MMOL/L (ref 3.5–5.2)
SODIUM SERPL-SCNC: 142 MMOL/L (ref 136–145)
TOTAL RATE: 24 BREATHS/MINUTE
VANCOMYCIN SERPL-MCNC: 16.3 MCG/ML (ref 5–40)
VENTILATOR MODE: ABNORMAL
VT ON VENT VENT: 0.42 ML

## 2024-03-13 PROCEDURE — 80069 RENAL FUNCTION PANEL: CPT | Performed by: INTERNAL MEDICINE

## 2024-03-13 PROCEDURE — 63710000001 INSULIN REGULAR HUMAN PER 5 UNITS: Performed by: NURSE PRACTITIONER

## 2024-03-13 PROCEDURE — 71045 X-RAY EXAM CHEST 1 VIEW: CPT

## 2024-03-13 PROCEDURE — 82375 ASSAY CARBOXYHB QUANT: CPT

## 2024-03-13 PROCEDURE — 25010000002 FENTANYL CITRATE (PF) 2500 MCG/50ML SOLUTION: Performed by: INTERNAL MEDICINE

## 2024-03-13 PROCEDURE — 25810000003 SODIUM CHLORIDE 0.9 % SOLUTION: Performed by: INTERNAL MEDICINE

## 2024-03-13 PROCEDURE — 25010000002 CEFEPIME PER 500 MG: Performed by: INTERNAL MEDICINE

## 2024-03-13 PROCEDURE — 80202 ASSAY OF VANCOMYCIN: CPT

## 2024-03-13 PROCEDURE — 63710000001 INSULIN DETEMIR PER 5 UNITS: Performed by: INTERNAL MEDICINE

## 2024-03-13 PROCEDURE — 94799 UNLISTED PULMONARY SVC/PX: CPT

## 2024-03-13 PROCEDURE — 63710000001 INSULIN DETEMIR PER 5 UNITS: Performed by: NURSE PRACTITIONER

## 2024-03-13 PROCEDURE — 82948 REAGENT STRIP/BLOOD GLUCOSE: CPT

## 2024-03-13 PROCEDURE — 94003 VENT MGMT INPAT SUBQ DAY: CPT

## 2024-03-13 PROCEDURE — 99291 CRITICAL CARE FIRST HOUR: CPT | Performed by: INTERNAL MEDICINE

## 2024-03-13 PROCEDURE — 25010000002 VANCOMYCIN HCL 1.25 G RECONSTITUTED SOLUTION 1 EACH VIAL

## 2024-03-13 PROCEDURE — 36600 WITHDRAWAL OF ARTERIAL BLOOD: CPT

## 2024-03-13 PROCEDURE — 82805 BLOOD GASES W/O2 SATURATION: CPT

## 2024-03-13 PROCEDURE — 25010000002 HEPARIN (PORCINE) PER 1000 UNITS: Performed by: INTERNAL MEDICINE

## 2024-03-13 PROCEDURE — 25810000003 SODIUM CHLORIDE 0.9 % SOLUTION 250 ML FLEX CONT

## 2024-03-13 PROCEDURE — 63710000001 INSULIN REGULAR HUMAN PER 5 UNITS: Performed by: INTERNAL MEDICINE

## 2024-03-13 PROCEDURE — 83050 HGB METHEMOGLOBIN QUAN: CPT

## 2024-03-13 RX ORDER — CARVEDILOL 6.25 MG/1
6.25 TABLET ORAL EVERY 12 HOURS SCHEDULED
Status: DISCONTINUED | OUTPATIENT
Start: 2024-03-13 | End: 2024-03-13

## 2024-03-13 RX ORDER — CARVEDILOL 12.5 MG/1
12.5 TABLET ORAL EVERY 12 HOURS SCHEDULED
Status: DISCONTINUED | OUTPATIENT
Start: 2024-03-13 | End: 2024-04-04 | Stop reason: HOSPADM

## 2024-03-13 RX ORDER — NICOTINE POLACRILEX 4 MG
15 LOZENGE BUCCAL
Status: DISCONTINUED | OUTPATIENT
Start: 2024-03-13 | End: 2024-03-20

## 2024-03-13 RX ORDER — IBUPROFEN 600 MG/1
1 TABLET ORAL
Status: DISCONTINUED | OUTPATIENT
Start: 2024-03-13 | End: 2024-04-04 | Stop reason: HOSPADM

## 2024-03-13 RX ORDER — TERAZOSIN 2 MG/1
2 CAPSULE ORAL ONCE
Status: COMPLETED | OUTPATIENT
Start: 2024-03-13 | End: 2024-03-13

## 2024-03-13 RX ORDER — DEXTROSE MONOHYDRATE 25 G/50ML
25 INJECTION, SOLUTION INTRAVENOUS
Status: DISCONTINUED | OUTPATIENT
Start: 2024-03-13 | End: 2024-04-04 | Stop reason: HOSPADM

## 2024-03-13 RX ORDER — TERAZOSIN 5 MG/1
5 CAPSULE ORAL EVERY 12 HOURS SCHEDULED
Status: DISCONTINUED | OUTPATIENT
Start: 2024-03-13 | End: 2024-03-14

## 2024-03-13 RX ADMIN — DEXMEDETOMIDINE HYDROCHLORIDE 1.2 MCG/KG/HR: 400 INJECTION, SOLUTION INTRAVENOUS at 16:50

## 2024-03-13 RX ADMIN — HYDRALAZINE HYDROCHLORIDE 100 MG: 50 TABLET ORAL at 05:42

## 2024-03-13 RX ADMIN — VANCOMYCIN HYDROCHLORIDE 1250 MG: 1.25 INJECTION, POWDER, LYOPHILIZED, FOR SOLUTION INTRAVENOUS at 08:48

## 2024-03-13 RX ADMIN — CEFEPIME 2000 MG: 2 INJECTION, POWDER, FOR SOLUTION INTRAVENOUS at 05:42

## 2024-03-13 RX ADMIN — ALBUTEROL SULFATE 2.5 MG: 2.5 SOLUTION RESPIRATORY (INHALATION) at 19:43

## 2024-03-13 RX ADMIN — INSULIN DETEMIR 5 UNITS: 100 INJECTION, SOLUTION SUBCUTANEOUS at 20:19

## 2024-03-13 RX ADMIN — AMLODIPINE BESYLATE 10 MG: 10 TABLET ORAL at 08:51

## 2024-03-13 RX ADMIN — BRIMONIDINE TARTRATE 1 DROP: 2 SOLUTION/ DROPS OPHTHALMIC at 20:20

## 2024-03-13 RX ADMIN — DEXMEDETOMIDINE HYDROCHLORIDE 1.2 MCG/KG/HR: 400 INJECTION, SOLUTION INTRAVENOUS at 05:56

## 2024-03-13 RX ADMIN — PANTOPRAZOLE SODIUM 40 MG: 40 INJECTION, POWDER, LYOPHILIZED, FOR SOLUTION INTRAVENOUS at 08:50

## 2024-03-13 RX ADMIN — NITROGLYCERIN 2 INCH: 20 OINTMENT TOPICAL at 05:42

## 2024-03-13 RX ADMIN — TERAZOSIN HYDROCHLORIDE 5 MG: 5 CAPSULE ORAL at 20:20

## 2024-03-13 RX ADMIN — HYDRALAZINE HYDROCHLORIDE 100 MG: 50 TABLET ORAL at 23:04

## 2024-03-13 RX ADMIN — DOCUSATE SODIUM 100 MG: 50 LIQUID ORAL at 20:19

## 2024-03-13 RX ADMIN — INSULIN DETEMIR 10 UNITS: 100 INJECTION, SOLUTION SUBCUTANEOUS at 08:49

## 2024-03-13 RX ADMIN — SENNOSIDES 10 ML: 8.8 LIQUID ORAL at 08:50

## 2024-03-13 RX ADMIN — ALBUTEROL SULFATE 2.5 MG: 2.5 SOLUTION RESPIRATORY (INHALATION) at 13:56

## 2024-03-13 RX ADMIN — 0.12% CHLORHEXIDINE GLUCONATE 15 ML: 1.2 RINSE ORAL at 08:50

## 2024-03-13 RX ADMIN — DORNASE ALFA 2.5 MG: 1 SOLUTION RESPIRATORY (INHALATION) at 19:43

## 2024-03-13 RX ADMIN — INSULIN HUMAN 2 UNITS: 100 INJECTION, SOLUTION PARENTERAL at 13:06

## 2024-03-13 RX ADMIN — DORNASE ALFA 2.5 MG: 1 SOLUTION RESPIRATORY (INHALATION) at 13:56

## 2024-03-13 RX ADMIN — INSULIN HUMAN 3 UNITS: 100 INJECTION, SOLUTION PARENTERAL at 05:43

## 2024-03-13 RX ADMIN — DEXMEDETOMIDINE HYDROCHLORIDE 0.9 MCG/KG/HR: 400 INJECTION, SOLUTION INTRAVENOUS at 02:18

## 2024-03-13 RX ADMIN — ALBUTEROL SULFATE 2.5 MG: 2.5 SOLUTION RESPIRATORY (INHALATION) at 17:26

## 2024-03-13 RX ADMIN — Medication 20 MG: at 02:55

## 2024-03-13 RX ADMIN — VALPROIC ACID 250 MG: 250 SOLUTION ORAL at 23:05

## 2024-03-13 RX ADMIN — TIMOLOL MALEATE 1 DROP: 5 SOLUTION/ DROPS OPHTHALMIC at 20:20

## 2024-03-13 RX ADMIN — HEPARIN SODIUM 5000 UNITS: 5000 INJECTION INTRAVENOUS; SUBCUTANEOUS at 05:42

## 2024-03-13 RX ADMIN — VALPROIC ACID 250 MG: 250 SOLUTION ORAL at 13:18

## 2024-03-13 RX ADMIN — VALPROIC ACID 250 MG: 250 SOLUTION ORAL at 05:42

## 2024-03-13 RX ADMIN — DEXMEDETOMIDINE HYDROCHLORIDE 1.2 MCG/KG/HR: 400 INJECTION, SOLUTION INTRAVENOUS at 20:18

## 2024-03-13 RX ADMIN — TERAZOSIN HYDROCHLORIDE 2 MG: 2 CAPSULE ORAL at 04:05

## 2024-03-13 RX ADMIN — CEFEPIME 2000 MG: 2 INJECTION, POWDER, FOR SOLUTION INTRAVENOUS at 18:42

## 2024-03-13 RX ADMIN — INSULIN HUMAN 2 UNITS: 100 INJECTION, SOLUTION PARENTERAL at 00:12

## 2024-03-13 RX ADMIN — FENTANYL CITRATE 50 MCG/HR: 50 INJECTION, SOLUTION INTRAMUSCULAR; INTRAVENOUS at 10:35

## 2024-03-13 RX ADMIN — Medication 10 ML: at 20:21

## 2024-03-13 RX ADMIN — TERAZOSIN HYDROCHLORIDE 5 MG: 5 CAPSULE ORAL at 13:06

## 2024-03-13 RX ADMIN — DOCUSATE SODIUM 100 MG: 50 LIQUID ORAL at 08:50

## 2024-03-13 RX ADMIN — CARVEDILOL 6.25 MG: 6.25 TABLET, FILM COATED ORAL at 10:35

## 2024-03-13 RX ADMIN — HEPARIN SODIUM 5000 UNITS: 5000 INJECTION INTRAVENOUS; SUBCUTANEOUS at 13:14

## 2024-03-13 RX ADMIN — BRIMONIDINE TARTRATE 1 DROP: 2 SOLUTION/ DROPS OPHTHALMIC at 08:51

## 2024-03-13 RX ADMIN — HEPARIN SODIUM 5000 UNITS: 5000 INJECTION INTRAVENOUS; SUBCUTANEOUS at 23:04

## 2024-03-13 RX ADMIN — ALBUTEROL SULFATE 2.5 MG: 2.5 SOLUTION RESPIRATORY (INHALATION) at 07:32

## 2024-03-13 RX ADMIN — HYDRALAZINE HYDROCHLORIDE 100 MG: 50 TABLET ORAL at 13:10

## 2024-03-13 RX ADMIN — TIMOLOL MALEATE 1 DROP: 5 SOLUTION/ DROPS OPHTHALMIC at 08:51

## 2024-03-13 RX ADMIN — Medication 10 ML: at 08:51

## 2024-03-13 RX ADMIN — SENNOSIDES 10 ML: 8.8 LIQUID ORAL at 20:32

## 2024-03-13 RX ADMIN — DEXMEDETOMIDINE HYDROCHLORIDE 1.2 MCG/KG/HR: 400 INJECTION, SOLUTION INTRAVENOUS at 09:35

## 2024-03-13 RX ADMIN — CARVEDILOL 12.5 MG: 12.5 TABLET, FILM COATED ORAL at 20:19

## 2024-03-13 RX ADMIN — BRIMONIDINE TARTRATE 1 DROP: 2 SOLUTION/ DROPS OPHTHALMIC at 16:50

## 2024-03-13 RX ADMIN — DEXMEDETOMIDINE HYDROCHLORIDE 1.5 MCG/KG/HR: 400 INJECTION, SOLUTION INTRAVENOUS at 13:10

## 2024-03-13 RX ADMIN — 0.12% CHLORHEXIDINE GLUCONATE 15 ML: 1.2 RINSE ORAL at 20:19

## 2024-03-13 NOTE — PROGRESS NOTES
Pharmacy Consult-Vancomycin Dosing  Omkar Nava is a  68 y.o. male receiving vancomycin therapy.     Indication:  Pneumonia  Consulting Provider: Intensivist  ID Consult:  No    Goal AUC: 400 - 600 mg/L*hr    Current Antimicrobial Therapy  Anti-Infectives (From admission, onward)      Ordered     Dose/Rate Route Frequency Start Stop    03/13/24 0738  Vancomycin HCl 1,250 mg in sodium chloride 0.9 % 250 mL MBP        Ordering Provider: Sarkis Shelton, PharmD    1,250 mg  200 mL/hr over 75 Minutes Intravenous Once 03/13/24 0830 03/13/24 1003    03/12/24 1107  cefepime 2000 mg IVPB in 100 mL NS (MBP)        Ordering Provider: Bibi Cervantes MD    2,000 mg  over 4 Hours Intravenous Every 12 Hours 03/12/24 1800 03/15/24 1759    03/12/24 0704  vancomycin 1250 mg/250 mL 0.9% NS IVPB ([facility])        Ordering Provider: Sarkis Shelton PharmD    1,250 mg  over 75 Minutes Intravenous Once 03/12/24 0800 03/12/24 0941    03/11/24 1033  vancomycin 1000 mg/250 mL 0.9% NS IVPB ([facility])        Ordering Provider: Antolin Guaman PharmD    1,000 mg  over 60 Minutes Intravenous Once 03/11/24 1130 03/11/24 1441    03/10/24 1343  vancomycin (dosing per levels)        Ordering Provider: Sarkis Easley RPH     Does not apply Daily 03/10/24 1430 03/15/24 0859    03/10/24 1404  vancomycin IVPB 1750 mg in 0.9% Sodium Chloride (premix) 500 mL        Ordering Provider: Sarkis Easley RPH    1,750 mg  285.7 mL/hr over 105 Minutes Intravenous Once 03/10/24 1430 03/10/24 1854    03/10/24 1254  cefepime 2000 mg IVPB in 100 mL NS (MBP)        Ordering Provider: Kurtis Cedeño MD    2,000 mg  over 30 Minutes Intravenous Once 03/10/24 1400 03/10/24 1413    03/10/24 1254  Pharmacy to dose vancomycin        Ordering Provider: Kurtis Cedeño MD     Does not apply Continuous PRN 03/10/24 1253 03/15/24 1252    03/10/24 0934  piperacillin-tazobactam (ZOSYN) 3.375 g IVPB in 100 mL NS MBP ([facility])        Ordering  "Provider: Omkar Seth MD    3.375 g Intravenous Once 03/10/24 0950 03/10/24 1109          Allergies  Allergies as of 03/10/2024    (No Known Allergies)     Labs    Results from last 7 days   Lab Units 03/13/24  0351 03/12/24  0406 03/11/24  1228   BUN mg/dL 35* 48* 57*   CREATININE mg/dL 1.47* 1.87* 2.02*     Results from last 7 days   Lab Units 03/12/24  0406 03/11/24  0424 03/11/24  0027   WBC 10*3/mm3 9.22 6.19 4.95     Evaluation of Dosing     Is Patient on Dialysis or Renal Replacement: No    Ht - 151.9 cm (59.8\")  Wt - 92.8 kg (204 lb 9.4 oz)    Intake & Output (last 2 days)         03/11 0701  03/12 0700 03/12 0701  03/13 0700 03/13 0701 03/14 0700    I.V. (mL/kg) 1497.2 (16) 682.8 (7.4)     Other 120 242     NG/ 1054     IV Piggyback 704.9 231.3     Total Intake(mL/kg) 3093.1 (33) 2210.1 (23.8)     Urine (mL/kg/hr) 2975 (1.3) 3175 (1.4)     Emesis/NG output       Stool 0 0     Total Output 2975 3175     Net +118.1 -964.9            Stool Unmeasured Occurrence 5 x 1 x           Microbiology and Radiology  Microbiology Results (last 10 days)       Procedure Component Value - Date/Time    Respiratory Culture - Sputum, NT Suction [[id number]] Collected: 03/11/24 0104    Lab Status: Preliminary result Specimen: Sputum from NT Suction Updated: 03/12/24 0956     Respiratory Culture Scant growth (1+) The culture consists of normal respiratory korin. This is a preliminary report; final report to follow.     Gram Stain Many (4+) WBCs per low power field      Rare (1+) Epithelial cells per low power field      Rare (1+) Yeast    MRSA Screen, PCR (Inpatient) - Swab, Nares [[id number]]  (Abnormal) Collected: 03/10/24 1415    Lab Status: Final result Specimen: Swab from Nares Updated: 03/10/24 1621     MRSA PCR Positive    Narrative:      The negative predictive value of this diagnostic test is high and should only be used to consider de-escalating anti-MRSA therapy. A positive result may indicate " colonization with MRSA and must be correlated clinically.    Respiratory Panel PCR w/COVID-19(SARS-CoV-2) GIAN[facility]/SIENNA/ANNA/PAD/COR/ASHIA In-House, NP Swab in UTM/VTM, 2 HR TAT - Swab, Nasopharynx [[id number]]  (Normal) Collected: 03/10/24 1415    Lab Status: Final result Specimen: Swab from Nasopharynx Updated: 03/10/24 1518     ADENOVIRUS, PCR Not Detected     Coronavirus 229E Not Detected     Coronavirus HKU1 Not Detected     Coronavirus NL63 Not Detected     Coronavirus OC43 Not Detected     COVID19 Not Detected     Human Metapneumovirus Not Detected     Human Rhinovirus/Enterovirus Not Detected     Influenza A PCR Not Detected     Influenza B PCR Not Detected     Parainfluenza Virus 1 Not Detected     Parainfluenza Virus 2 Not Detected     Parainfluenza Virus 3 Not Detected     Parainfluenza Virus 4 Not Detected     RSV, PCR Not Detected     Bordetella pertussis pcr Not Detected     Bordetella parapertussis PCR Not Detected     Chlamydophila pneumoniae PCR Not Detected     Mycoplasma pneumo by PCR Not Detected    Narrative:      In the setting of a positive respiratory panel with a viral infection PLUS a negative procalcitonin without other underlying concern for bacterial infection, consider observing off antibiotics or discontinuation of antibiotics and continue supportive care. If the respiratory panel is positive for atypical bacterial infection (Bordetella pertussis, Chlamydophila pneumoniae, or Mycoplasma pneumoniae), consider antibiotic de-escalation to target atypical bacterial infection.    Blood Culture - Blood, Hand, Right [[id number]]  (Normal) Collected: 03/10/24 0944    Lab Status: Preliminary result Specimen: Blood from Hand, Right Updated: 03/13/24 1000     Blood Culture No growth at 3 days    Blood Culture - Blood, Arm, Right [[id number]]  (Normal) Collected: 03/10/24 0924    Lab Status: Preliminary result Specimen: Blood from Arm, Right Updated: 03/13/24 1000     Blood Culture No growth at 3 days     COVID-19 and FLU A/B PCR, 1 HR TAT - Swab, Nasopharynx [[id number]]  (Normal) Collected: 03/10/24 0923    Lab Status: Final result Specimen: Swab from Nasopharynx Updated: 03/10/24 1017     COVID19 Not Detected     Influenza A PCR Not Detected     Influenza B PCR Not Detected    Narrative:      Fact sheet for providers: https://www.fda.gov/media/824609/download    Fact sheet for patients: https://www.fda.gov/media/804161/download    Test performed by PCR.          Reported Vancomycin Levels    Results from last 7 days   Lab Units 03/13/24  0351 03/12/24  0406 03/11/24  0424   VANCOMYCIN RM mcg/mL 16.30 14.20 14.90                 Assessment/Plan:  Noted renal function improvement; UOP has increased over last 24 hours with slightly decreasing SCr.  I feel comfortable with scheduling vancomycin today.  Vancomycin 1250mg q24h.  Level check 3/15 with AM labs.  Discussed linezolid therapy with MD on 3/11; will not change to this due to significant psych medications in history and avoiding interaction potential with resumption.  Will continue cefepime 2g IV q12h; consider increase with further renal improvement.  Pharmacy will continue to follow and adjust dose based on renal function, drug levels, and clinical status.    Thanks,  Sarkis Shelton, PharmD, BCPS, BCCCP  03/13/24  10:52 EDT

## 2024-03-13 NOTE — NURSING NOTE
Between the hours of 2651-4558 patient having agitation episodes very frequently and increased secretions. Around 1200 Patient had roving eyes, deviation upward, and was throwing left arm up and down but with control. Charge at bedside. NARGIS Ellis and  notified-Discussed patient not W/D x4. Precedex @1.5mcg per DEVON Clark, see mar. STAT eeg completed.

## 2024-03-13 NOTE — CASE MANAGEMENT/SOCIAL WORK
Continued Stay Note  [facility]     Patient Name: Omkar Nava  MRN: [id number]  Today's Date: 3/13/2024    Admit Date: 3/10/2024    Plan: [facility]   Discharge Plan       Row Name 03/13/24 1358       Plan    Plan [facility]    Plan Comments Mr. Nava continues to require mechanical ventilation with Fio2 @.50.  He remains sedated and is being followed by the Pulmonology/Critical Care team and Nephrology for Acute respiratory failure, Aspiration pneumonia, Chronic congestive heart failure with reduced EF, Chronic kidney disease.  CM will continue to follow the evolving plan of care and assist with discharge planning as appropriate.  [facility] with [facility] is following for eventual return to this facility.    Final Discharge Disposition Code 30 - still a patient                   Discharge Codes    No documentation.                 Expected Discharge Date and Time       Expected Discharge Date Expected Discharge Time    Mar 15, 2024               Heidi Harmon RN

## 2024-03-13 NOTE — PROGRESS NOTES
Critical Care Note     LOS: 3 days   Patient Care Team:  eDann Cao MD as PCP - General (Internal Medicine)    Chief Complaint/Reason for visit:    Chief Complaint   Patient presents with    Aspiration     Acute respiratory failure  Aspiration pneumonia  Chronic congestive heart failure with reduced EF  Chronic kidney disease  Diabetes mellitus type 2  Dementia with delirium  Hypertension  Chronic anemia    Subjective     Interval History:     Fentanyl is off yesterday and he was on Precedex.  He had some extremity faint flailing, agitation and roving eye movements.  Stat EEG was done and showed some slowing but no seizure activity.  Overnight he dropped his oxygen saturation and FiO2 was increased to 50%.  He does overbreathing the ventilator.  He remains afebrile.  He remains extremely hypertensive, 170/66.  He remains on Precedex 1.2 mics per kilogram per minute.  Fentanyl was restarted.  Urine output 3175 mL yesterday with diuretics.    Review of Systems:    All systems were reviewed and negative except as noted in subjective.    Medical history, surgical history, social history, family history reviewed    Objective     Intake/Output:    Intake/Output Summary (Last 24 hours) at 3/13/2024 1120  Last data filed at 3/13/2024 0600  Gross per 24 hour   Intake 1748.7 ml   Output 2755 ml   Net -1006.3 ml       Nutrition:  NPO Diet NPO Type: Strict NPO    Infusions:  dexmedetomidine, 0.2-1.5 mcg/kg/hr (Order-Specific), Last Rate: 1.2 mcg/kg/hr (03/13/24 0935)  fentanyl 10 mcg/mL,  mcg/hr, Last Rate: 50 mcg/hr (03/13/24 1035)  norepinephrine, 0.02-0.3 mcg/kg/min  Pharmacy to dose vancomycin,         Mechanical Ventilator Settings:            Resp Rate (Set): 14     FiO2 (%): 50 %  PEEP/CPAP (cm H2O): 5 cm H20    Minute Ventilation (L/min) (Obs): 8.17 L/min  Resp Rate (Observed) Vent: 20  I:E Ratio (Set): 1:3.29  I:E Ratio (Obs): 1:2.1    PIP Observed (cm H2O): 12 cm H2O  Plateau Pressure (cm H2O): (S)  "20 cm H2O    Telemetry: Sinus rhythm             Vital Signs  Blood pressure 170/66, pulse 78, temperature 99 °F (37.2 °C), temperature source Bladder, resp. rate 26, height 151.9 cm (59.8\"), weight 92.8 kg (204 lb 9.4 oz), SpO2 90%.    Physical Exam:  General Appearance:  Old appearing gentleman sedated, supine   Head:  No visible trauma   Eyes:          Conjunctiva pale, both pupils are distorted   Ears:     Throat: Orally intubated, nasogastric tube   Neck: Trachea midline, left IJ deep line   Back:      Lungs:   Respirations are bilateral, scattered expiratory rhonchi,     Heart:  Regular rhythm, S1, S2 auscultated   Abdomen:   Flat, bowel sounds present, soft   Rectal:   Deferred   Extremities: Left forefoot amputation healed.  No pretibial edema   Pulses: Palpable radial pulses    Skin: Multiple wounds currently dressed   Lymph nodes: No cervical adenopathy   Neurologic: Sedated      Results Review:     I reviewed the patient's new clinical results.   Results from last 7 days   Lab Units 03/13/24  0351 03/12/24  0406 03/11/24  1806 03/11/24  1228 03/11/24  0424 03/10/24  1241 03/10/24  0944   SODIUM mmol/L 142 141  --  146* 143   < > 139   POTASSIUM mmol/L 4.0 5.1 5.7* 6.0* 5.7*   < > 7.4*   CHLORIDE mmol/L 109* 110*  --  115* 111*   < > 111*   CO2 mmol/L 24.0 23.0  --  23.0 23.0   < > 22.0   BUN mg/dL 35* 48*  --  57* 64*   < > 63*   CREATININE mg/dL 1.47* 1.87*  --  2.02* 2.29*   < > 1.78*   CALCIUM mg/dL 8.9 8.5*  --  8.3* 8.8   < > 8.8   BILIRUBIN mg/dL  --   --   --   --  0.2  --  <0.2   ALK PHOS U/L  --   --   --   --  74  --  89   ALT (SGPT) U/L  --   --   --   --  46*  --  46*   AST (SGOT) U/L  --   --   --   --  28  --  32   GLUCOSE mg/dL 213* 217*  --  95 103*   < > 147*    < > = values in this interval not displayed.     Results from last 7 days   Lab Units 03/12/24  0406 03/11/24  1806 03/11/24  1228 03/11/24  0424 03/11/24  0027   WBC 10*3/mm3 9.22  --   --  6.19 4.95   HEMOGLOBIN g/dL 7.9* 8.0* " 7.1* 7.2* 7.5*   HEMATOCRIT % 25.9* 25.3* 23.0* 23.5* 24.5*   PLATELETS 10*3/mm3 154  --   --  146 154   MONOCYTES % %  --   --   --  1.0*  --    EOSINOPHIL % %  --   --   --  0.0*  --      Results from last 7 days   Lab Units 03/13/24  0422   PH, ARTERIAL pH units 7.423   PO2 ART mm Hg 65.0*   PCO2, ARTERIAL mm Hg 40.1   HCO3 ART mmol/L 26.2*     Lab Results   Component Value Date    BLOODCX No growth at 3 days 03/10/2024     Lab Results   Component Value Date    URINECX 30,000-40,000 CFU/mL Normal Urogenital Preethi 05/06/2017       I reviewed the patient's new imaging including images and reports.    XR CHEST 1 VW    Date of Exam: 3/13/2024 3:07 AM EDT    Indication: Intubated Patient    Comparison: 1 day prior.    Findings:  Support hardware projects unchanged. Similar aeration with small effusion on the left and adjacent basilar atelectasis in addition to scattered bilateral patchy airspace disease. No distinct pneumothorax. Unchanged heart and mediastinal contours.   Impression:     Impression:  No significant interval change      Electronically Signed: Boom Rodriguez MD   3/13/2024 6:49 AM EDT         FINDINGS: CTA of the chest, March 10, 2024  No significant focal filling defects are identified to indicate the presence of pulmonary embolism.     There are extensive groundglass appearing multifocal infiltrates throughout the lungs bilaterally with interstitial changes. Many of these infiltrates or rounded in morphology. More pronounced consolidations are seen in the lung bases. Small bilateral   pleural effusions are noted. The findings suggest developing atypical/viral infection or multifocal pneumonia and may indicate changes of Covid 19 infection.     Mildly prominent hilar and mediastinal lymph nodes are noted which are likely reactive. No significant axillary lymphadenopathy is seen. The heart and great vessels are stable. Mild coronary artery calcifications are noted. The thyroid gland is stable.   The  esophagus is unremarkable.     The limited evaluation of the upper abdomen demonstrates no evidence for acute abnormality.     No acute osseous abnormalities are observed.     IMPRESSION:  1.No evidence for pulmonary embolism.  2.Evidence for ongoing atypical/viral infection versus multifocal pneumonia and potential changes of Covid 19 infection. Recommend correlation for signs or symptoms of acute infection and follow-up to ensure improvement/resolution.           Electronically Signed: Renny Canales MD    3/10/2024 10:28 AM EDT     Interpretation Summary echocardiogram January 15, 2024         Left ventricular ejection fraction appears to be 56 - 60%.    Left ventricular wall thickness is consistent with hypertrophy.    Estimated right ventricular systolic pressure from tricuspid regurgitation is mildly elevated (35-45 mmHg).    There is a small (1-2cm) pericardial effusion.    No evidence for pericardial tamponade    All medications reviewed.   albuterol, 2.5 mg, Nebulization, 4x Daily - RT  amLODIPine, 10 mg, Nasogastric, Q24H  brimonidine, 1 drop, Both Eyes, TID   And  timolol, 1 drop, Both Eyes, BID  carvedilol, 6.25 mg, Nasogastric, Q12H  cefepime, 2,000 mg, Intravenous, Q12H  chlorhexidine, 15 mL, Mouth/Throat, Q12H  sennosides, 10 mL, Nasogastric, BID   And  docusate sodium, 100 mg, Nasogastric, BID  heparin (porcine), 5,000 Units, Subcutaneous, Q8H  hydrALAZINE, 100 mg, Nasogastric, Q8H  insulin detemir, 5 Units, Subcutaneous, Q12H  insulin regular, 2 Units, Subcutaneous, Q6H  pantoprazole, 40 mg, Intravenous, Q24H  sodium chloride, 10 mL, Intravenous, Q12H  sodium zirconium cyclosilicate, 10 g, Nasogastric, TID  Valproic Acid, 250 mg, Nasogastric, Q8H  [START ON 3/14/2024] vancomycin, 1,250 mg, Intravenous, Q24H          Assessment & Plan       Healthcare-associated pneumonia    Essential hypertension    Neurocognitive disorder    AMS (altered mental status)    Dementia    Dysphagia    Type 2 diabetes  mellitus with diabetic chronic kidney disease    Acute respiratory failure with hypoxia    Acute kidney injury superimposed on chronic kidney disease    Hyperkalemia    Bradycardia    Hypothermia    Debilitated 68-year-old gentleman with diabetes mellitus complicated by blindness, osteomyelitis, chronic kidney disease, underlying dementia with psychosis, dependent for mobility, bathing and dressing, chronic anemia, heart failure with normal EF who is now admitted for his third admission in the last 2.5 months for an aspiration event with aspiration pneumonia.  On his previous admission a modified barium swallow did reveal moderate oral and mild pharyngeal dysphagia February 5, 2024.  Respiratory status deteriorated overnight on March 10 and he required intubation.       #1 acute respiratory failure, aspiration pneumonia he is currently on a rate of 14 tidal volume 420 PEEP of 5 and 50% FiO2.  ABG this am, pH of 7.42, CO2 40, O2 65.  FiO2 increased overnight from 35% to 50%.  Chest x-ray shows persistent patchy bilateral infiltrates.  He has moderate secretions.  Gram stain shows yeast and many white cells but no bacteria.  MRSA PCR is positive.  Respiratory panel PCR is negative.  Admission procalcitonin is elevated at 5.72.  In February he was treated empirically for pneumonia with cefepime, linezolid.  He did not have positive cultures. He is on vancomycin and cefepime. Has a known history of dysphagia with an abnormal modified barium swallow in February of this year.  He apparently was a smoker up until nursing home admission 2 years ago.  His CT scan does not reveal advanced COPD.  He was not on inhalers, or oxygen as an outpatient.   Yesterday I did attempt to turn his sedation down and was planning on a possible wean.  However he became agitated and could not proceed with extubation.    -Restart fentanyl  -Continue Precedex  -Increase PEEP to 8  -Follow-up final cultures  -Continue vancomycin,  cefepime  -Continue bronchodilators  -Add mucolytic's  -Discussed permanent feeding tube placement with family given his recurrent aspiration pneumonias    #2 diabetes mellitus with chronic kidney disease, blindness, diabetic foot ulcers and osteomyelitis requiring toe amputation.  Blood glucoses are running 185-210 with increased insulin coverage.  Nasogastric tube was apparently very difficult to place and it took 10 attempts.  Today his creatinine is 1.47, improved.  Potassium was elevated at 6.8 and he has received several doses of Lokelma.  Most recent potassium is 4.  February 11 his creatinine was 1.63, which I suspect is baseline.  He did get IV contrast for CTA of the chest.  Renal ultrasound reveals right kidney measuring 12.1 cm, left measuring 11.3 cm, no hydronephrosis, normal corticomedullary differentiation.    -Nephrology following  -Levemir 5 units every 12  -Scheduled regular insulin 2 units every 6 hours  -Sliding scale insulin  -Story catheter for strict ins and outs  -Diuretics as needed  -Continue tube feeding    #3 dementia with psychosis, nursing home resident for 2 years.  Currently he is in a wheelchair needs assist to transfer, needs assist for bathing and dressing.  Home medications include Seroquel, Depakote, Abilify, Prozac, trazodone.  He did fall in 2023 and sustained a neck injury without fracture.  He had swelling in the prevertebral space C2-C5.  He was placed in a cervical collar for ligamentous injuries.  Patient had been removing the collar on his own and cervical collar was removed.  Decision was made to leave the collar off as he had no acute fracture or dislocation.     -Continue Depakote  -Will restart some of his other psychiatric medications as we decrease sedation    #4 hypertension, congestive heart failure currently blood pressure is running 170.  He does take amlodipine, carvedilol, clonidine, hydralazine, torsemide as an outpatient.  Echocardiogram in January 2024  revealed an EF of 56 to 60% with some LVH and a small 1 to 2 cm pericardial effusion without evidence of tamponade.  He had some mild MR. He is currently on Precedex which can cause bradycardia however he is tolerated as needed labetalol    - hydralazine to 100 mg every 8 hours  -Continue amlodipine  -Start carvedilol back at a lower dose and increase as able  -Hold clonidine,  while he is on Precedex  -Start fentanyl drip to see if pain is contributing to hypertension        VTE Prophylaxis: subcutaneous heparin    Stress Ulcer Prophylaxis: Protonix    Bibi Cervantes MD  03/13/24  11:20 EDT      Time: Critical care 35 min  I personally provided care to this critically ill patient as documented above.  Critical care time does not include time spent on separately billed procedures.  None of my critical care time was concurrent with other critical care providers.

## 2024-03-13 NOTE — PROGRESS NOTES
"   LOS: 3 days    Patient Care Team:  Deann Cao MD as PCP - General (Internal Medicine)    Consulted for: Acute on chronic renal failure, hyperkalemia  68-year-old CKD stage III admitted with shortness of breath, choking of food at NH, admitted with JAYY, hyperkalemia, acute respiratory failure now intubated, anemia.  Later developed oliguria with increasing BUN/creatinine.  Patient was treated with Lokelma, bicarb, dextrose, insulin, calcium gluconate.  Subjective     Interval History:   Renal function improving. Hyperkalemia resolved. Remain intubated on MV.    Review of Systems:    Review of systems could not be obtained due to  patient intubated. emergent nature of case.    Objective     Vital Sign Min/Max for last 24 hours  Temp  Min: 99 °F (37.2 °C)  Max: 99.9 °F (37.7 °C)   BP  Min: 156/73  Max: 186/77   Pulse  Min: 69  Max: 87   Resp  Min: 19  Max: 26   SpO2  Min: 88 %  Max: 100 %   No data recorded   Weight  Min: 92.8 kg (204 lb 9.4 oz)  Max: 92.8 kg (204 lb 9.4 oz)     Flowsheet Rows      Flowsheet Row First Filed Value   Admission Height 151.9 cm (59.8\") Documented at 03/10/2024 0910   Admission Weight 84.8 kg (187 lb) Documented at 03/10/2024 0910            I/O this shift:  In: 1000.5 [I.V.:246.9; Other:132; NG/GT:273; IV Piggyback:348.6]  Out: 675 [Urine:675]  I/O last 3 completed shifts:  In: 3230.5 [I.V.:1043.4; Other:362; NG/GT:1545; IV Piggyback:280.1]  Out: 5050 [Urine:5050]    Physical Exam:  General Appearance: Intubated on ventilator  Oral intubated  Eyes: PER.  Neck: Restricted due to intubation no JVD  Lungs: Ventilator sound heard, equal chest movement, nonlabored.  Heart: No gallop, murmur, rub, RRR.  Abdomen: Soft, nontender, positive bowel sounds mild obesity  Extremities: Toe amputation on the left foot, trace bilateral pretibial edema  Neuro: Intubated on ventilator  Skin: Warm and dry.  Area of abrasions and healing noted.   Story catheter in place.    WBC WBC   Date " "Value Ref Range Status   03/12/2024 9.22 3.40 - 10.80 10*3/mm3 Final   03/11/2024 6.19 3.40 - 10.80 10*3/mm3 Final   03/11/2024 4.95 3.40 - 10.80 10*3/mm3 Final      HGB Hemoglobin   Date Value Ref Range Status   03/12/2024 7.9 (L) 13.0 - 17.7 g/dL Final   03/11/2024 8.0 (L) 13.0 - 17.7 g/dL Final   03/11/2024 7.1 (L) 13.0 - 17.7 g/dL Final   03/11/2024 7.2 (L) 13.0 - 17.7 g/dL Final   03/11/2024 7.5 (L) 13.0 - 17.7 g/dL Final      HCT Hematocrit   Date Value Ref Range Status   03/12/2024 25.9 (L) 37.5 - 51.0 % Final   03/11/2024 25.3 (L) 37.5 - 51.0 % Final   03/11/2024 23.0 (L) 37.5 - 51.0 % Final   03/11/2024 23.5 (L) 37.5 - 51.0 % Final   03/11/2024 24.5 (L) 37.5 - 51.0 % Final      Platlets No results found for: \"LABPLAT\"   MCV MCV   Date Value Ref Range Status   03/12/2024 94.5 79.0 - 97.0 fL Final   03/11/2024 93.3 79.0 - 97.0 fL Final   03/11/2024 94.2 79.0 - 97.0 fL Final          Sodium Sodium   Date Value Ref Range Status   03/13/2024 142 136 - 145 mmol/L Final   03/12/2024 141 136 - 145 mmol/L Final   03/11/2024 146 (H) 136 - 145 mmol/L Final   03/11/2024 143 136 - 145 mmol/L Final   03/11/2024 146 (H) 136 - 145 mmol/L Final   03/10/2024 140 136 - 145 mmol/L Final      Potassium Potassium   Date Value Ref Range Status   03/13/2024 4.0 3.5 - 5.2 mmol/L Final   03/12/2024 5.1 3.5 - 5.2 mmol/L Final   03/11/2024 5.7 (H) 3.5 - 5.2 mmol/L Final   03/11/2024 6.0 (H) 3.5 - 5.2 mmol/L Final   03/11/2024 5.7 (H) 3.5 - 5.2 mmol/L Final   03/11/2024 6.8 (C) 3.5 - 5.2 mmol/L Final   03/10/2024 6.7 (C) 3.5 - 5.2 mmol/L Final      Chloride Chloride   Date Value Ref Range Status   03/13/2024 109 (H) 98 - 107 mmol/L Final   03/12/2024 110 (H) 98 - 107 mmol/L Final   03/11/2024 115 (H) 98 - 107 mmol/L Final   03/11/2024 111 (H) 98 - 107 mmol/L Final   03/11/2024 115 (H) 98 - 107 mmol/L Final   03/10/2024 112 (H) 98 - 107 mmol/L Final      CO2 CO2   Date Value Ref Range Status   03/13/2024 24.0 22.0 - 29.0 mmol/L Final " "  03/12/2024 23.0 22.0 - 29.0 mmol/L Final   03/11/2024 23.0 22.0 - 29.0 mmol/L Final   03/11/2024 23.0 22.0 - 29.0 mmol/L Final   03/11/2024 23.0 22.0 - 29.0 mmol/L Final   03/10/2024 20.0 (L) 22.0 - 29.0 mmol/L Final      BUN BUN   Date Value Ref Range Status   03/13/2024 35 (H) 8 - 23 mg/dL Final   03/12/2024 48 (H) 8 - 23 mg/dL Final   03/11/2024 57 (H) 8 - 23 mg/dL Final   03/11/2024 64 (H) 8 - 23 mg/dL Final   03/11/2024 63 (H) 8 - 23 mg/dL Final   03/10/2024 63 (H) 8 - 23 mg/dL Final      Creatinine Creatinine   Date Value Ref Range Status   03/13/2024 1.47 (H) 0.76 - 1.27 mg/dL Final   03/12/2024 1.87 (H) 0.76 - 1.27 mg/dL Final   03/11/2024 2.02 (H) 0.76 - 1.27 mg/dL Final   03/11/2024 2.29 (H) 0.76 - 1.27 mg/dL Final   03/11/2024 2.09 (H) 0.76 - 1.27 mg/dL Final   03/10/2024 1.96 (H) 0.76 - 1.27 mg/dL Final      Calcium Calcium   Date Value Ref Range Status   03/13/2024 8.9 8.6 - 10.5 mg/dL Final   03/12/2024 8.5 (L) 8.6 - 10.5 mg/dL Final   03/11/2024 8.3 (L) 8.6 - 10.5 mg/dL Final   03/11/2024 8.8 8.6 - 10.5 mg/dL Final   03/11/2024 8.9 8.6 - 10.5 mg/dL Final   03/10/2024 8.8 8.6 - 10.5 mg/dL Final      PO4 No results found for: \"CAPO4\"   Albumin Albumin   Date Value Ref Range Status   03/13/2024 3.3 (L) 3.5 - 5.2 g/dL Final   03/11/2024 3.4 (L) 3.5 - 5.2 g/dL Final      Magnesium Magnesium   Date Value Ref Range Status   03/11/2024 2.4 1.6 - 2.4 mg/dL Final      Uric Acid No results found for: \"URICACID\"        Results Review:     I reviewed the patient's new clinical results.    albuterol, 2.5 mg, Nebulization, 4x Daily - RT  amLODIPine, 10 mg, Nasogastric, Q24H  brimonidine, 1 drop, Both Eyes, TID   And  timolol, 1 drop, Both Eyes, BID  carvedilol, 12.5 mg, Nasogastric, Q12H  cefepime, 2,000 mg, Intravenous, Q12H  chlorhexidine, 15 mL, Mouth/Throat, Q12H  sennosides, 10 mL, Nasogastric, BID   And  docusate sodium, 100 mg, Nasogastric, BID  dornase alpha, 2.5 mg, Inhalation, BID - RT  heparin " (porcine), 5,000 Units, Subcutaneous, Q8H  hydrALAZINE, 100 mg, Nasogastric, Q8H  insulin detemir, 5 Units, Subcutaneous, Q12H  insulin regular, 2 Units, Subcutaneous, Q6H  pantoprazole, 40 mg, Intravenous, Q24H  sodium chloride, 10 mL, Intravenous, Q12H  terazosin, 5 mg, Oral, Q12H  Valproic Acid, 250 mg, Nasogastric, Q8H  [START ON 3/14/2024] vancomycin, 1,250 mg, Intravenous, Q24H      dexmedetomidine, 0.2-1.5 mcg/kg/hr (Order-Specific), Last Rate: 1.5 mcg/kg/hr (03/13/24 1310)  fentanyl 10 mcg/mL,  mcg/hr, Last Rate: 150 mcg/hr (03/13/24 1247)  norepinephrine, 0.02-0.3 mcg/kg/min  Pharmacy to dose vancomycin,         Medication Review: Reviewed    Assessment & Plan       Healthcare-associated pneumonia    Essential hypertension    Neurocognitive disorder    AMS (altered mental status)    Dementia    Dysphagia    Type 2 diabetes mellitus with diabetic chronic kidney disease    Acute respiratory failure with hypoxia    Acute kidney injury superimposed on chronic kidney disease    Hyperkalemia    Bradycardia    Hypothermia    1.  Acute on chronic renal failure CKD stage III.  Acute rise in creatinine with recent event.  Decreased renal perfusion, hypoxia, IV contrast.  2.  Hyperkalemia: Multifactorial due to JAYY, respiratory failure, acidosis.  3.  CKD stage III.  Recent creatinine 1.68.  CT abdomen showed normal-sized kidneys with no stones or hydronephrosis.  4.  Respiratory failure: On ventilator.  CT angio negative for pulmonary embolism  5.  Essential hypertension.  6.  Altered mental status.  7.  Bradycardia.  8.  Dementia.     Recommendations:  Renal function and UOP improved. Conservative care from renal standpoint.     Moy Morris MD  03/13/24  14:37 EDT

## 2024-03-14 ENCOUNTER — APPOINTMENT (OUTPATIENT)
Dept: CT IMAGING | Facility: HOSPITAL | Age: 69
End: 2024-03-14
Payer: MEDICARE

## 2024-03-14 ENCOUNTER — APPOINTMENT (OUTPATIENT)
Dept: GENERAL RADIOLOGY | Facility: HOSPITAL | Age: 69
End: 2024-03-14
Payer: MEDICARE

## 2024-03-14 PROBLEM — J96.01 ACUTE RESPIRATORY FAILURE WITH HYPOXEMIA: Status: ACTIVE | Noted: 2024-03-14

## 2024-03-14 LAB
ABO GROUP BLD: NORMAL
ALBUMIN SERPL-MCNC: 3.1 G/DL (ref 3.5–5.2)
ALBUMIN SERPL-MCNC: 3.2 G/DL (ref 3.5–5.2)
ALP SERPL-CCNC: 83 U/L (ref 39–117)
ALT SERPL W P-5'-P-CCNC: 33 U/L (ref 1–41)
ANION GAP SERPL CALCULATED.3IONS-SCNC: 9 MMOL/L (ref 5–15)
ARTERIAL PATENCY WRIST A: ABNORMAL
ARTERIAL PATENCY WRIST A: POSITIVE
AST SERPL-CCNC: 30 U/L (ref 1–40)
ATMOSPHERIC PRESS: ABNORMAL MM[HG]
ATMOSPHERIC PRESS: ABNORMAL MM[HG]
BACTERIA SPEC RESP CULT: ABNORMAL
BACTERIA SPEC RESP CULT: ABNORMAL
BASE EXCESS BLDA CALC-SCNC: 2.4 MMOL/L (ref 0–2)
BASE EXCESS BLDA CALC-SCNC: 2.8 MMOL/L (ref 0–2)
BASOPHILS # BLD MANUAL: 0 10*3/MM3 (ref 0–0.2)
BASOPHILS NFR BLD MANUAL: 0 % (ref 0–1.5)
BDY SITE: ABNORMAL
BDY SITE: ABNORMAL
BILIRUB CONJ SERPL-MCNC: <0.2 MG/DL (ref 0–0.3)
BILIRUB INDIRECT SERPL-MCNC: ABNORMAL MG/DL
BILIRUB SERPL-MCNC: 0.2 MG/DL (ref 0–1.2)
BLD GP AB SCN SERPL QL: NEGATIVE
BODY TEMPERATURE: 37
BODY TEMPERATURE: 37
BUN SERPL-MCNC: 35 MG/DL (ref 8–23)
BUN/CREAT SERPL: 20.7 (ref 7–25)
CALCIUM SPEC-SCNC: 8.7 MG/DL (ref 8.6–10.5)
CHLORIDE SERPL-SCNC: 109 MMOL/L (ref 98–107)
CO2 BLDA-SCNC: 28.8 MMOL/L (ref 22–33)
CO2 BLDA-SCNC: 29.6 MMOL/L (ref 22–33)
CO2 SERPL-SCNC: 25 MMOL/L (ref 22–29)
COHGB MFR BLD: 1.3 % (ref 0–2)
COHGB MFR BLD: 1.8 % (ref 0–2)
CREAT SERPL-MCNC: 1.69 MG/DL (ref 0.76–1.27)
CRYPTOC AG CSF QL: NEGATIVE
D-LACTATE SERPL-SCNC: 1 MMOL/L (ref 0.5–2)
DEPRECATED RDW RBC AUTO: 53.6 FL (ref 37–54)
EGFRCR SERPLBLD CKD-EPI 2021: 44.2 ML/MIN/1.73
EOSINOPHIL # BLD MANUAL: 0.11 10*3/MM3 (ref 0–0.4)
EOSINOPHIL NFR BLD MANUAL: 1 % (ref 0.3–6.2)
EPAP: 0
EPAP: 0
ERYTHROCYTE [DISTWIDTH] IN BLOOD BY AUTOMATED COUNT: 15.9 % (ref 12.3–15.4)
GLUCOSE BLDC GLUCOMTR-MCNC: 125 MG/DL (ref 70–130)
GLUCOSE BLDC GLUCOMTR-MCNC: 138 MG/DL (ref 70–130)
GLUCOSE BLDC GLUCOMTR-MCNC: 28 MG/DL (ref 70–130)
GLUCOSE BLDC GLUCOMTR-MCNC: 29 MG/DL (ref 70–130)
GLUCOSE BLDC GLUCOMTR-MCNC: 45 MG/DL (ref 70–130)
GLUCOSE BLDC GLUCOMTR-MCNC: 47 MG/DL (ref 70–130)
GLUCOSE BLDC GLUCOMTR-MCNC: 75 MG/DL (ref 70–130)
GLUCOSE BLDC GLUCOMTR-MCNC: 94 MG/DL (ref 70–130)
GLUCOSE BLDC GLUCOMTR-MCNC: 97 MG/DL (ref 70–130)
GLUCOSE SERPL-MCNC: 63 MG/DL (ref 65–99)
GRAM STN SPEC: ABNORMAL
HCO3 BLDA-SCNC: 27.4 MMOL/L (ref 20–26)
HCO3 BLDA-SCNC: 28.2 MMOL/L (ref 20–26)
HCT VFR BLD AUTO: 22.3 % (ref 37.5–51)
HCT VFR BLD AUTO: 24.7 % (ref 37.5–51)
HCT VFR BLD AUTO: 27.9 % (ref 37.5–51)
HCT VFR BLD CALC: 20.8 % (ref 38–51)
HCT VFR BLD CALC: 27.1 % (ref 38–51)
HGB BLD-MCNC: 6.8 G/DL (ref 13–17.7)
HGB BLD-MCNC: 7.7 G/DL (ref 13–17.7)
HGB BLD-MCNC: 9 G/DL (ref 13–17.7)
HGB BLDA-MCNC: 6.8 G/DL (ref 13.5–17.5)
HGB BLDA-MCNC: 8.8 G/DL (ref 13.5–17.5)
INHALED O2 CONCENTRATION: 60 %
INHALED O2 CONCENTRATION: 80 %
IPAP: 0
IPAP: 0
LYMPHOCYTES # BLD MANUAL: 1.38 10*3/MM3 (ref 0.7–3.1)
LYMPHOCYTES NFR BLD MANUAL: 6 % (ref 5–12)
MCH RBC QN AUTO: 27.8 PG (ref 26.6–33)
MCHC RBC AUTO-ENTMCNC: 30.5 G/DL (ref 31.5–35.7)
MCV RBC AUTO: 91 FL (ref 79–97)
METHGB BLD QL: 0.2 % (ref 0–1.5)
METHGB BLD QL: 0.6 % (ref 0–1.5)
MODALITY: ABNORMAL
MODALITY: ABNORMAL
MONOCYTES # BLD: 0.64 10*3/MM3 (ref 0.1–0.9)
NEUTROPHILS # BLD AUTO: 8.51 10*3/MM3 (ref 1.7–7)
NEUTROPHILS NFR BLD MANUAL: 80 % (ref 42.7–76)
OXYHGB MFR BLDV: 92.7 % (ref 94–99)
OXYHGB MFR BLDV: 96.7 % (ref 94–99)
PAW @ PEAK INSP FLOW SETTING VENT: 0 CMH2O
PAW @ PEAK INSP FLOW SETTING VENT: 0 CMH2O
PCO2 BLDA: 44.1 MM HG (ref 35–45)
PCO2 BLDA: 46.7 MM HG (ref 35–45)
PCO2 TEMP ADJ BLD: 44.1 MM HG (ref 35–48)
PCO2 TEMP ADJ BLD: 46.7 MM HG (ref 35–48)
PEEP RESPIRATORY: 8 CM[H2O]
PH BLDA: 7.39 PH UNITS (ref 7.35–7.45)
PH BLDA: 7.4 PH UNITS (ref 7.35–7.45)
PH, TEMP CORRECTED: 7.39 PH UNITS
PH, TEMP CORRECTED: 7.4 PH UNITS
PHOSPHATE SERPL-MCNC: 3.3 MG/DL (ref 2.5–4.5)
PLAT MORPH BLD: NORMAL
PLATELET # BLD AUTO: 126 10*3/MM3 (ref 140–450)
PMV BLD AUTO: 11.8 FL (ref 6–12)
PO2 BLDA: 102 MM HG (ref 83–108)
PO2 BLDA: 69.5 MM HG (ref 83–108)
PO2 TEMP ADJ BLD: 102 MM HG (ref 83–108)
PO2 TEMP ADJ BLD: 69.5 MM HG (ref 83–108)
POTASSIUM SERPL-SCNC: 3.6 MMOL/L (ref 3.5–5.2)
PROT SERPL-MCNC: 6.9 G/DL (ref 6–8.5)
RBC # BLD AUTO: 2.45 10*6/MM3 (ref 4.14–5.8)
RBC MORPH BLD: NORMAL
RH BLD: POSITIVE
SODIUM SERPL-SCNC: 143 MMOL/L (ref 136–145)
T&S EXPIRATION DATE: NORMAL
TOTAL RATE: 0 BREATHS/MINUTE
TOTAL RATE: 21 BREATHS/MINUTE
VARIANT LYMPHS NFR BLD MANUAL: 10 % (ref 19.6–45.3)
VARIANT LYMPHS NFR BLD MANUAL: 3 % (ref 0–5)
VENTILATOR MODE: ABNORMAL
VT ON VENT VENT: 0.42 ML
WBC MORPH BLD: NORMAL
WBC NRBC COR # BLD AUTO: 10.64 10*3/MM3 (ref 3.4–10.8)

## 2024-03-14 PROCEDURE — 63710000001 INSULIN REGULAR HUMAN PER 5 UNITS: Performed by: INTERNAL MEDICINE

## 2024-03-14 PROCEDURE — 82375 ASSAY CARBOXYHB QUANT: CPT

## 2024-03-14 PROCEDURE — 87385 HISTOPLASMA CAPSUL AG IA: CPT | Performed by: INTERNAL MEDICINE

## 2024-03-14 PROCEDURE — 86923 COMPATIBILITY TEST ELECTRIC: CPT

## 2024-03-14 PROCEDURE — 87186 SC STD MICRODIL/AGAR DIL: CPT | Performed by: INTERNAL MEDICINE

## 2024-03-14 PROCEDURE — 84100 ASSAY OF PHOSPHORUS: CPT | Performed by: INTERNAL MEDICINE

## 2024-03-14 PROCEDURE — 85014 HEMATOCRIT: CPT | Performed by: INTERNAL MEDICINE

## 2024-03-14 PROCEDURE — P9016 RBC LEUKOCYTES REDUCED: HCPCS

## 2024-03-14 PROCEDURE — 3E0H76Z INTRODUCTION OF NUTRITIONAL SUBSTANCE INTO LOWER GI, VIA NATURAL OR ARTIFICIAL OPENING: ICD-10-PCS | Performed by: INTERNAL MEDICINE

## 2024-03-14 PROCEDURE — 87449 NOS EACH ORGANISM AG IA: CPT | Performed by: INTERNAL MEDICINE

## 2024-03-14 PROCEDURE — 86037 ANCA TITER EACH ANTIBODY: CPT | Performed by: INTERNAL MEDICINE

## 2024-03-14 PROCEDURE — 83516 IMMUNOASSAY NONANTIBODY: CPT | Performed by: INTERNAL MEDICINE

## 2024-03-14 PROCEDURE — 83050 HGB METHEMOGLOBIN QUAN: CPT

## 2024-03-14 PROCEDURE — 87070 CULTURE OTHR SPECIMN AEROBIC: CPT | Performed by: INTERNAL MEDICINE

## 2024-03-14 PROCEDURE — 82805 BLOOD GASES W/O2 SATURATION: CPT

## 2024-03-14 PROCEDURE — 36430 TRANSFUSION BLD/BLD COMPNT: CPT

## 2024-03-14 PROCEDURE — 0DHA8UZ INSERTION OF FEEDING DEVICE INTO JEJUNUM, VIA NATURAL OR ARTIFICIAL OPENING ENDOSCOPIC: ICD-10-PCS | Performed by: INTERNAL MEDICINE

## 2024-03-14 PROCEDURE — 94799 UNLISTED PULMONARY SVC/PX: CPT

## 2024-03-14 PROCEDURE — 25810000003 SODIUM CHLORIDE 0.9 % SOLUTION: Performed by: INTERNAL MEDICINE

## 2024-03-14 PROCEDURE — 87077 CULTURE AEROBIC IDENTIFY: CPT | Performed by: INTERNAL MEDICINE

## 2024-03-14 PROCEDURE — 25010000002 FUROSEMIDE PER 20 MG: Performed by: INTERNAL MEDICINE

## 2024-03-14 PROCEDURE — 74018 RADEX ABDOMEN 1 VIEW: CPT

## 2024-03-14 PROCEDURE — 85007 BL SMEAR W/DIFF WBC COUNT: CPT | Performed by: INTERNAL MEDICINE

## 2024-03-14 PROCEDURE — 86038 ANTINUCLEAR ANTIBODIES: CPT | Performed by: INTERNAL MEDICINE

## 2024-03-14 PROCEDURE — 94003 VENT MGMT INPAT SUBQ DAY: CPT

## 2024-03-14 PROCEDURE — 63710000001 INSULIN DETEMIR PER 5 UNITS: Performed by: INTERNAL MEDICINE

## 2024-03-14 PROCEDURE — 25010000002 CEFEPIME PER 500 MG: Performed by: INTERNAL MEDICINE

## 2024-03-14 PROCEDURE — 83605 ASSAY OF LACTIC ACID: CPT | Performed by: INTERNAL MEDICINE

## 2024-03-14 PROCEDURE — 25010000002 FENTANYL CITRATE (PF) 2500 MCG/50ML SOLUTION: Performed by: INTERNAL MEDICINE

## 2024-03-14 PROCEDURE — 74177 CT ABD & PELVIS W/CONTRAST: CPT

## 2024-03-14 PROCEDURE — 82948 REAGENT STRIP/BLOOD GLUCOSE: CPT

## 2024-03-14 PROCEDURE — 86900 BLOOD TYPING SEROLOGIC ABO: CPT | Performed by: NURSE PRACTITIONER

## 2024-03-14 PROCEDURE — 25810000003 SODIUM CHLORIDE 0.9 % SOLUTION 250 ML FLEX CONT: Performed by: INTERNAL MEDICINE

## 2024-03-14 PROCEDURE — 43241 EGD TUBE/CATH INSERTION: CPT | Performed by: INTERNAL MEDICINE

## 2024-03-14 PROCEDURE — 86850 RBC ANTIBODY SCREEN: CPT | Performed by: NURSE PRACTITIONER

## 2024-03-14 PROCEDURE — 0 DEXTROSE 5 % SOLUTION

## 2024-03-14 PROCEDURE — 71275 CT ANGIOGRAPHY CHEST: CPT

## 2024-03-14 PROCEDURE — 94761 N-INVAS EAR/PLS OXIMETRY MLT: CPT

## 2024-03-14 PROCEDURE — 86901 BLOOD TYPING SEROLOGIC RH(D): CPT | Performed by: NURSE PRACTITIONER

## 2024-03-14 PROCEDURE — 87899 AGENT NOS ASSAY W/OPTIC: CPT | Performed by: INTERNAL MEDICINE

## 2024-03-14 PROCEDURE — 85018 HEMOGLOBIN: CPT | Performed by: INTERNAL MEDICINE

## 2024-03-14 PROCEDURE — 86900 BLOOD TYPING SEROLOGIC ABO: CPT

## 2024-03-14 PROCEDURE — C1769 GUIDE WIRE: HCPCS | Performed by: INTERNAL MEDICINE

## 2024-03-14 PROCEDURE — 25510000001 IOPAMIDOL PER 1 ML: Performed by: INTERNAL MEDICINE

## 2024-03-14 PROCEDURE — 36600 WITHDRAWAL OF ARTERIAL BLOOD: CPT

## 2024-03-14 PROCEDURE — 82248 BILIRUBIN DIRECT: CPT | Performed by: INTERNAL MEDICINE

## 2024-03-14 PROCEDURE — 99291 CRITICAL CARE FIRST HOUR: CPT | Performed by: INTERNAL MEDICINE

## 2024-03-14 PROCEDURE — 25010000002 VANCOMYCIN HCL 1.25 G RECONSTITUTED SOLUTION 1 EACH VIAL: Performed by: INTERNAL MEDICINE

## 2024-03-14 PROCEDURE — 85025 COMPLETE CBC W/AUTO DIFF WBC: CPT | Performed by: INTERNAL MEDICINE

## 2024-03-14 PROCEDURE — 99222 1ST HOSP IP/OBS MODERATE 55: CPT | Performed by: PHYSICIAN ASSISTANT

## 2024-03-14 PROCEDURE — 87205 SMEAR GRAM STAIN: CPT | Performed by: INTERNAL MEDICINE

## 2024-03-14 PROCEDURE — 80053 COMPREHEN METABOLIC PANEL: CPT | Performed by: INTERNAL MEDICINE

## 2024-03-14 PROCEDURE — 71045 X-RAY EXAM CHEST 1 VIEW: CPT

## 2024-03-14 RX ORDER — PANTOPRAZOLE SODIUM 40 MG/10ML
40 INJECTION, POWDER, LYOPHILIZED, FOR SOLUTION INTRAVENOUS EVERY 12 HOURS SCHEDULED
Status: DISCONTINUED | OUTPATIENT
Start: 2024-03-14 | End: 2024-03-25

## 2024-03-14 RX ORDER — DEXTROSE MONOHYDRATE 50 MG/ML
25 INJECTION, SOLUTION INTRAVENOUS CONTINUOUS
Status: DISCONTINUED | OUTPATIENT
Start: 2024-03-14 | End: 2024-03-15

## 2024-03-14 RX ORDER — FUROSEMIDE 10 MG/ML
100 INJECTION INTRAMUSCULAR; INTRAVENOUS ONCE
Status: COMPLETED | OUTPATIENT
Start: 2024-03-14 | End: 2024-03-14

## 2024-03-14 RX ORDER — ITRACONAZOLE 10 MG/ML
200 SOLUTION ORAL 2 TIMES DAILY
Status: DISCONTINUED | OUTPATIENT
Start: 2024-03-14 | End: 2024-03-14

## 2024-03-14 RX ORDER — HEPARIN SODIUM 5000 [USP'U]/ML
5000 INJECTION, SOLUTION INTRAVENOUS; SUBCUTANEOUS EVERY 12 HOURS SCHEDULED
Status: DISCONTINUED | OUTPATIENT
Start: 2024-03-14 | End: 2024-03-14

## 2024-03-14 RX ORDER — POLYETHYLENE GLYCOL 3350 17 G/17G
17 POWDER, FOR SOLUTION ORAL DAILY
Status: DISCONTINUED | OUTPATIENT
Start: 2024-03-14 | End: 2024-03-15

## 2024-03-14 RX ORDER — POLYETHYLENE GLYCOL 3350 17 G/17G
17 POWDER, FOR SOLUTION ORAL DAILY
Status: DISCONTINUED | OUTPATIENT
Start: 2024-03-14 | End: 2024-03-14

## 2024-03-14 RX ORDER — TERAZOSIN 5 MG/1
5 CAPSULE ORAL EVERY 12 HOURS SCHEDULED
Status: DISCONTINUED | OUTPATIENT
Start: 2024-03-14 | End: 2024-04-04 | Stop reason: HOSPADM

## 2024-03-14 RX ORDER — ITRACONAZOLE 10 MG/ML
200 SOLUTION ORAL EVERY 12 HOURS
Qty: 1200 ML | Refills: 0 | Status: DISCONTINUED | OUTPATIENT
Start: 2024-03-14 | End: 2024-03-17

## 2024-03-14 RX ORDER — ITRACONAZOLE 10 MG/ML
200 SOLUTION ORAL ONCE
Status: COMPLETED | OUTPATIENT
Start: 2024-03-14 | End: 2024-03-14

## 2024-03-14 RX ORDER — ALBUTEROL SULFATE 2.5 MG/3ML
2.5 SOLUTION RESPIRATORY (INHALATION)
Status: DISCONTINUED | OUTPATIENT
Start: 2024-03-14 | End: 2024-04-04 | Stop reason: HOSPADM

## 2024-03-14 RX ADMIN — INSULIN DETEMIR 5 UNITS: 100 INJECTION, SOLUTION SUBCUTANEOUS at 10:41

## 2024-03-14 RX ADMIN — FUROSEMIDE 100 MG: 10 INJECTION, SOLUTION INTRAVENOUS at 12:57

## 2024-03-14 RX ADMIN — DEXMEDETOMIDINE HYDROCHLORIDE 1.4 MCG/KG/HR: 400 INJECTION, SOLUTION INTRAVENOUS at 18:43

## 2024-03-14 RX ADMIN — HYDRALAZINE HYDROCHLORIDE 100 MG: 50 TABLET ORAL at 21:11

## 2024-03-14 RX ADMIN — Medication 10 ML: at 21:12

## 2024-03-14 RX ADMIN — CEFEPIME 2000 MG: 2 INJECTION, POWDER, FOR SOLUTION INTRAVENOUS at 05:50

## 2024-03-14 RX ADMIN — 0.12% CHLORHEXIDINE GLUCONATE 15 ML: 1.2 RINSE ORAL at 21:11

## 2024-03-14 RX ADMIN — FENTANYL CITRATE 150 MCG/HR: 50 INJECTION, SOLUTION INTRAMUSCULAR; INTRAVENOUS at 03:54

## 2024-03-14 RX ADMIN — WHITE PETROLATUM 57.7 %-MINERAL OIL 31.9 % EYE OINTMENT: at 18:52

## 2024-03-14 RX ADMIN — DEXMEDETOMIDINE HYDROCHLORIDE 1.2 MCG/KG/HR: 400 INJECTION, SOLUTION INTRAVENOUS at 12:55

## 2024-03-14 RX ADMIN — DORNASE ALFA 2.5 MG: 1 SOLUTION RESPIRATORY (INHALATION) at 22:39

## 2024-03-14 RX ADMIN — PANTOPRAZOLE SODIUM 40 MG: 40 INJECTION, POWDER, FOR SOLUTION INTRAVENOUS at 21:12

## 2024-03-14 RX ADMIN — ITRACONAZOLE 200 MG: 10 SOLUTION ORAL at 16:31

## 2024-03-14 RX ADMIN — ALBUTEROL SULFATE 2.5 MG: 2.5 SOLUTION RESPIRATORY (INHALATION) at 13:43

## 2024-03-14 RX ADMIN — ITRACONAZOLE 200 MG: 10 SOLUTION ORAL at 16:29

## 2024-03-14 RX ADMIN — VALPROIC ACID 250 MG: 250 SOLUTION ORAL at 05:40

## 2024-03-14 RX ADMIN — DEXTROSE MONOHYDRATE 25 G: 25 INJECTION, SOLUTION INTRAVENOUS at 18:35

## 2024-03-14 RX ADMIN — DEXMEDETOMIDINE HYDROCHLORIDE 0.6 MCG/KG/HR: 400 INJECTION, SOLUTION INTRAVENOUS at 00:08

## 2024-03-14 RX ADMIN — VANCOMYCIN HYDROCHLORIDE 1250 MG: 1.25 INJECTION, POWDER, LYOPHILIZED, FOR SOLUTION INTRAVENOUS at 10:38

## 2024-03-14 RX ADMIN — CEFEPIME 2000 MG: 2 INJECTION, POWDER, FOR SOLUTION INTRAVENOUS at 18:49

## 2024-03-14 RX ADMIN — WHITE PETROLATUM 57.7 %-MINERAL OIL 31.9 % EYE OINTMENT: at 21:12

## 2024-03-14 RX ADMIN — BRIMONIDINE TARTRATE 1 DROP: 2 SOLUTION/ DROPS OPHTHALMIC at 10:41

## 2024-03-14 RX ADMIN — VALPROIC ACID 250 MG: 250 SOLUTION ORAL at 14:51

## 2024-03-14 RX ADMIN — HYDRALAZINE HYDROCHLORIDE 100 MG: 50 TABLET ORAL at 05:39

## 2024-03-14 RX ADMIN — IOPAMIDOL 85 ML: 755 INJECTION, SOLUTION INTRAVENOUS at 09:50

## 2024-03-14 RX ADMIN — INSULIN HUMAN 2 UNITS: 100 INJECTION, SOLUTION PARENTERAL at 00:22

## 2024-03-14 RX ADMIN — VALPROIC ACID 250 MG: 250 SOLUTION ORAL at 23:03

## 2024-03-14 RX ADMIN — TIMOLOL MALEATE 1 DROP: 5 SOLUTION/ DROPS OPHTHALMIC at 21:12

## 2024-03-14 RX ADMIN — DOCUSATE SODIUM 100 MG: 50 LIQUID ORAL at 10:40

## 2024-03-14 RX ADMIN — DEXMEDETOMIDINE HYDROCHLORIDE 1 MCG/KG/HR: 400 INJECTION, SOLUTION INTRAVENOUS at 01:50

## 2024-03-14 RX ADMIN — TERAZOSIN HYDROCHLORIDE ANHYDROUS 5 MG: 5 CAPSULE ORAL at 10:40

## 2024-03-14 RX ADMIN — SENNOSIDES 10 ML: 8.8 LIQUID ORAL at 21:11

## 2024-03-14 RX ADMIN — ALBUTEROL SULFATE 2.5 MG: 2.5 SOLUTION RESPIRATORY (INHALATION) at 19:23

## 2024-03-14 RX ADMIN — ALBUTEROL SULFATE 2.5 MG: 2.5 SOLUTION RESPIRATORY (INHALATION) at 08:09

## 2024-03-14 RX ADMIN — BRIMONIDINE TARTRATE 1 DROP: 2 SOLUTION/ DROPS OPHTHALMIC at 16:26

## 2024-03-14 RX ADMIN — TERAZOSIN HYDROCHLORIDE ANHYDROUS 5 MG: 5 CAPSULE ORAL at 21:12

## 2024-03-14 RX ADMIN — DEXMEDETOMIDINE HYDROCHLORIDE 1.4 MCG/KG/HR: 400 INJECTION, SOLUTION INTRAVENOUS at 15:36

## 2024-03-14 RX ADMIN — HYDRALAZINE HYDROCHLORIDE 100 MG: 50 TABLET ORAL at 14:51

## 2024-03-14 RX ADMIN — TIMOLOL MALEATE 1 DROP: 5 SOLUTION/ DROPS OPHTHALMIC at 10:41

## 2024-03-14 RX ADMIN — POLYETHYLENE GLYCOL 3350 17 G: 17 POWDER, FOR SOLUTION ORAL at 10:38

## 2024-03-14 RX ADMIN — DEXTROSE MONOHYDRATE 25 G: 25 INJECTION, SOLUTION INTRAVENOUS at 23:13

## 2024-03-14 RX ADMIN — BRIMONIDINE TARTRATE 1 DROP: 2 SOLUTION/ DROPS OPHTHALMIC at 21:12

## 2024-03-14 RX ADMIN — DORNASE ALFA 2.5 MG: 1 SOLUTION RESPIRATORY (INHALATION) at 08:08

## 2024-03-14 RX ADMIN — Medication 10 ML: at 10:40

## 2024-03-14 RX ADMIN — DEXMEDETOMIDINE HYDROCHLORIDE 1.4 MCG/KG/HR: 400 INJECTION, SOLUTION INTRAVENOUS at 21:20

## 2024-03-14 RX ADMIN — CARVEDILOL 12.5 MG: 12.5 TABLET, FILM COATED ORAL at 10:40

## 2024-03-14 RX ADMIN — DEXTROSE MONOHYDRATE 25 ML/HR: 50 INJECTION, SOLUTION INTRAVENOUS at 20:25

## 2024-03-14 RX ADMIN — INSULIN HUMAN 2 UNITS: 100 INJECTION, SOLUTION PARENTERAL at 12:57

## 2024-03-14 RX ADMIN — PANTOPRAZOLE SODIUM 40 MG: 40 INJECTION, POWDER, FOR SOLUTION INTRAVENOUS at 10:37

## 2024-03-14 RX ADMIN — AMLODIPINE BESYLATE 10 MG: 10 TABLET ORAL at 10:40

## 2024-03-14 RX ADMIN — SENNOSIDES 10 ML: 8.8 LIQUID ORAL at 10:39

## 2024-03-14 RX ADMIN — CARVEDILOL 12.5 MG: 12.5 TABLET, FILM COATED ORAL at 21:11

## 2024-03-14 RX ADMIN — DOCUSATE SODIUM 100 MG: 50 LIQUID ORAL at 21:11

## 2024-03-14 RX ADMIN — 0.12% CHLORHEXIDINE GLUCONATE 15 ML: 1.2 RINSE ORAL at 10:39

## 2024-03-14 NOTE — PROGRESS NOTES
"   LOS: 4 days    Patient Care Team:  Deann Cao MD as PCP - General (Internal Medicine)    Consulted for: Acute on chronic renal failure, hyperkalemia  68-year-old CKD stage III admitted with shortness of breath, choking of food at NH, admitted with JAYY, hyperkalemia, acute respiratory failure now intubated, anemia.  Later developed oliguria with increasing BUN/creatinine.  Patient was treated with Lokelma, bicarb, dextrose, insulin, calcium gluconate.  Subjective     Interval History:   Patient remains on ventilator.  Sedated.  Chart reviewed.  Labs seen.    Review of Systems:    Review of systems could not be obtained due to  patient intubated. emergent nature of case.    Objective     Vital Sign Min/Max for last 24 hours  Temp  Min: 98.8 °F (37.1 °C)  Max: 100.4 °F (38 °C)   BP  Min: 121/62  Max: 174/72   Pulse  Min: 69  Max: 80   Resp  Min: 19  Max: 24   SpO2  Min: 88 %  Max: 97 %   No data recorded   No data recorded     Flowsheet Rows      Flowsheet Row First Filed Value   Admission Height 151.9 cm (59.8\") Documented at 03/10/2024 0910   Admission Weight 84.8 kg (187 lb) Documented at 03/10/2024 0910            I/O this shift:  In: 403.8 [Blood:403.8]  Out: -   I/O last 3 completed shifts:  In: 3565.9 [I.V.:1575.3; Other:373; NG/GT:1269; IV Piggyback:348.6]  Out: 3314 [Urine:3314]    Physical Exam:  General Appearance: -American male intubated on ventilator no distress.  Oral intubated  Eyes: PER.  Neck: Restricted due to intubation no JVD  Lungs: Ventilator sound heard, equal chest movement, nonlabored.  Heart: No gallop, murmur, rub, RRR.  Abdomen: Soft, nontender, positive bowel sounds mild obesity  Extremities: Toe amputation on the left foot, trace bilateral pretibial edema  Neuro: Intubated on ventilator  Skin: Warm and dry.  Area of abrasions and healing noted.   Story catheter in place.    WBC WBC   Date Value Ref Range Status   03/14/2024 10.64 3.40 - 10.80 10*3/mm3 Final " "  03/12/2024 9.22 3.40 - 10.80 10*3/mm3 Final      HGB Hemoglobin   Date Value Ref Range Status   03/14/2024 6.8 (C) 13.0 - 17.7 g/dL Final   03/12/2024 7.9 (L) 13.0 - 17.7 g/dL Final   03/11/2024 8.0 (L) 13.0 - 17.7 g/dL Final   03/11/2024 7.1 (L) 13.0 - 17.7 g/dL Final      HCT Hematocrit   Date Value Ref Range Status   03/14/2024 22.3 (L) 37.5 - 51.0 % Final   03/12/2024 25.9 (L) 37.5 - 51.0 % Final   03/11/2024 25.3 (L) 37.5 - 51.0 % Final   03/11/2024 23.0 (L) 37.5 - 51.0 % Final      Platlets No results found for: \"LABPLAT\"   MCV MCV   Date Value Ref Range Status   03/14/2024 91.0 79.0 - 97.0 fL Final   03/12/2024 94.5 79.0 - 97.0 fL Final          Sodium Sodium   Date Value Ref Range Status   03/14/2024 143 136 - 145 mmol/L Final   03/13/2024 142 136 - 145 mmol/L Final   03/12/2024 141 136 - 145 mmol/L Final   03/11/2024 146 (H) 136 - 145 mmol/L Final      Potassium Potassium   Date Value Ref Range Status   03/14/2024 3.6 3.5 - 5.2 mmol/L Final   03/13/2024 4.0 3.5 - 5.2 mmol/L Final   03/12/2024 5.1 3.5 - 5.2 mmol/L Final   03/11/2024 5.7 (H) 3.5 - 5.2 mmol/L Final   03/11/2024 6.0 (H) 3.5 - 5.2 mmol/L Final      Chloride Chloride   Date Value Ref Range Status   03/14/2024 109 (H) 98 - 107 mmol/L Final   03/13/2024 109 (H) 98 - 107 mmol/L Final   03/12/2024 110 (H) 98 - 107 mmol/L Final   03/11/2024 115 (H) 98 - 107 mmol/L Final      CO2 CO2   Date Value Ref Range Status   03/14/2024 25.0 22.0 - 29.0 mmol/L Final   03/13/2024 24.0 22.0 - 29.0 mmol/L Final   03/12/2024 23.0 22.0 - 29.0 mmol/L Final   03/11/2024 23.0 22.0 - 29.0 mmol/L Final      BUN BUN   Date Value Ref Range Status   03/14/2024 35 (H) 8 - 23 mg/dL Final   03/13/2024 35 (H) 8 - 23 mg/dL Final   03/12/2024 48 (H) 8 - 23 mg/dL Final   03/11/2024 57 (H) 8 - 23 mg/dL Final      Creatinine Creatinine   Date Value Ref Range Status   03/14/2024 1.69 (H) 0.76 - 1.27 mg/dL Final   03/13/2024 1.47 (H) 0.76 - 1.27 mg/dL Final   03/12/2024 1.87 (H) 0.76 " "- 1.27 mg/dL Final   03/11/2024 2.02 (H) 0.76 - 1.27 mg/dL Final      Calcium Calcium   Date Value Ref Range Status   03/14/2024 8.7 8.6 - 10.5 mg/dL Final   03/13/2024 8.9 8.6 - 10.5 mg/dL Final   03/12/2024 8.5 (L) 8.6 - 10.5 mg/dL Final   03/11/2024 8.3 (L) 8.6 - 10.5 mg/dL Final      PO4 No results found for: \"CAPO4\"   Albumin Albumin   Date Value Ref Range Status   03/14/2024 3.1 (L) 3.5 - 5.2 g/dL Final   03/14/2024 3.2 (L) 3.5 - 5.2 g/dL Final   03/13/2024 3.3 (L) 3.5 - 5.2 g/dL Final      Magnesium No results found for: \"MG\"     Uric Acid No results found for: \"URICACID\"        Results Review:     I reviewed the patient's new clinical results.    albuterol, 2.5 mg, Nebulization, Q6H - RT  amLODIPine, 10 mg, Nasogastric, Q24H  brimonidine, 1 drop, Both Eyes, TID   And  timolol, 1 drop, Both Eyes, BID  carvedilol, 12.5 mg, Nasogastric, Q12H  cefepime, 2,000 mg, Intravenous, Q12H  chlorhexidine, 15 mL, Mouth/Throat, Q12H  sennosides, 10 mL, Nasogastric, BID   And  docusate sodium, 100 mg, Nasogastric, BID  dornase alpha, 2.5 mg, Inhalation, BID - RT  furosemide, 100 mg, Intravenous, Once  hydrALAZINE, 100 mg, Nasogastric, Q8H  insulin detemir, 5 Units, Subcutaneous, Q12H  insulin regular, 2 Units, Subcutaneous, Q6H  itraconazole, 200 mg, Nasogastric, Q12H  itraconazole, 200 mg, Nasogastric, Once  pantoprazole, 40 mg, Intravenous, Q12H  polyethylene glycol, 17 g, Nasogastric, Daily  sodium chloride, 10 mL, Intravenous, Q12H  terazosin, 5 mg, Nasogastric, Q12H  Valproic Acid, 250 mg, Nasogastric, Q8H  vancomycin, 1,250 mg, Intravenous, Q24H      dexmedetomidine, 0.2-1.5 mcg/kg/hr (Order-Specific), Last Rate: 1 mcg/kg/hr (03/14/24 1114)  fentanyl 10 mcg/mL,  mcg/hr, Last Rate: Stopped (03/14/24 4951)  norepinephrine, 0.02-0.3 mcg/kg/min  Pharmacy to dose vancomycin,         Medication Review: Reviewed    Assessment & Plan       Healthcare-associated pneumonia    Essential hypertension    Neurocognitive " disorder    AMS (altered mental status)    Dementia    Dysphagia    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  Acute on chronic renal failure CKD stage III.  Acute rise in creatinine with recent event.  Decreased renal perfusion, hypoxia, IV contrast.  2.  Hyperkalemia: Multifactorial due to JAYY, respiratory failure, acidosis.  3.  CKD stage III.  Recent creatinine 1.68.  CT abdomen showed normal-sized kidneys with no stones or hydronephrosis.  4.  Respiratory failure: On ventilator.  CT angio negative for pulmonary embolism  5.  Essential hypertension.  6.  Altered mental status.  7.  Bradycardia.  8.  Dementia.     Recommendations:  Renal function improved.  Keep MAP greater than 65 mmHg.  Avoid nephrotoxic medication.  Adjust medication according to the GFR.  High risk complex patient with multiple medical problems.  Case discussed with the sister in the room.    Hailey Alexis MD  03/14/24  11:41 EDT

## 2024-03-14 NOTE — PROGRESS NOTES
"Critical Care Note     LOS: 4 days   Patient Care Team:  Deann Cao MD as PCP - General (Internal Medicine)    Chief Complaint/Reason for visit:    Chief Complaint   Patient presents with    Aspiration     Acute respiratory failure  Aspiration pneumonia  Chronic congestive heart failure with reduced EF  Chronic kidney disease  Diabetes mellitus type 2  Dementia with delirium  Hypertension  Chronic anemia    Subjective     Interval History:     Marked worsening oxygenation over the last 48 hours.  FiO2 is now up to 80%.  Hemoglobin decreased to 6.8 and he is receiving 1 unit of blood.  He has had no hematemesis or melena.  Abdomen has become distended.  KUB shows possible ileus.  Low-grade temperature 100.2.  Urine output yesterday 1.3 L.  Sedated on fentanyl, Precedex    Review of Systems:    All systems were reviewed and negative except as noted in subjective.    Medical history, surgical history, social history, family history reviewed    Objective     Intake/Output:    Intake/Output Summary (Last 24 hours) at 3/14/2024 1205  Last data filed at 3/14/2024 1115  Gross per 24 hour   Intake 1989.65 ml   Output 639 ml   Net 1350.65 ml       Nutrition:  NPO Diet NPO Type: Strict NPO    Infusions:  dexmedetomidine, 0.2-1.5 mcg/kg/hr (Order-Specific), Last Rate: 1 mcg/kg/hr (03/14/24 1114)  fentanyl 10 mcg/mL,  mcg/hr, Last Rate: Stopped (03/14/24 0457)  norepinephrine, 0.02-0.3 mcg/kg/min  Pharmacy to dose vancomycin,         Mechanical Ventilator Settings:            Resp Rate (Set): 18     FiO2 (%): 80 %  PEEP/CPAP (cm H2O): 8 cm H20    Minute Ventilation (L/min) (Obs): 6.3 L/min  Resp Rate (Observed) Vent: 21  I:E Ratio (Set): 1:2.33  I:E Ratio (Obs): 2.38:1    PIP Observed (cm H2O): 31 cm H2O  Plateau Pressure (cm H2O): (S) 22 cm H2O    Telemetry: Sinus rhythm             Vital Signs  Blood pressure 157/72, pulse 77, temperature 98.8 °F (37.1 °C), resp. rate 20, height 151.9 cm (59.8\"), weight 92.8 " kg (204 lb 9.4 oz), SpO2 93%.    Physical Exam:  General Appearance:  Old appearing gentleman sedated, supine   Head:  No visible trauma   Eyes:          Conjunctiva pale, both pupils are distorted   Ears:     Throat: Orally intubated, nasogastric tube   Neck: Trachea midline, left IJ deep line   Back:      Lungs:   Respirations are bilateral, scattered expiratory rhonchi,     Heart:  Regular rhythm, S1, S2 auscultated   Abdomen:   Distended, bowel sounds present but diminished, abdomen is firm   Rectal:   Deferred   Extremities: Left forefoot amputation healed.   Trace upper extremity edema   Pulses: Palpable radial pulses    Skin: Multiple wounds currently dressed   Lymph nodes: No cervical adenopathy   Neurologic: Sedated      Results Review:     I reviewed the patient's new clinical results.   Results from last 7 days   Lab Units 03/14/24  0358 03/13/24  0351 03/12/24  0406 03/11/24  1228 03/11/24  0424 03/10/24  1241 03/10/24  0944   SODIUM mmol/L 143 142 141   < > 143   < > 139   POTASSIUM mmol/L 3.6 4.0 5.1   < > 5.7*   < > 7.4*   CHLORIDE mmol/L 109* 109* 110*   < > 111*   < > 111*   CO2 mmol/L 25.0 24.0 23.0   < > 23.0   < > 22.0   BUN mg/dL 35* 35* 48*   < > 64*   < > 63*   CREATININE mg/dL 1.69* 1.47* 1.87*   < > 2.29*   < > 1.78*   CALCIUM mg/dL 8.7 8.9 8.5*   < > 8.8   < > 8.8   BILIRUBIN mg/dL 0.2  --   --   --  0.2  --  <0.2   ALK PHOS U/L 83  --   --   --  74  --  89   ALT (SGPT) U/L 33  --   --   --  46*  --  46*   AST (SGOT) U/L 30  --   --   --  28  --  32   GLUCOSE mg/dL 63* 213* 217*   < > 103*   < > 147*    < > = values in this interval not displayed.     Results from last 7 days   Lab Units 03/14/24  0358 03/12/24  0406 03/11/24  1806 03/11/24  1228 03/11/24  0424   WBC 10*3/mm3 10.64 9.22  --   --  6.19   HEMOGLOBIN g/dL 6.8* 7.9* 8.0*   < > 7.2*   HEMATOCRIT % 22.3* 25.9* 25.3*   < > 23.5*   PLATELETS 10*3/mm3 126* 154  --   --  146   MONOCYTES % % 6.0  --   --   --  1.0*   EOSINOPHIL % %  1.0  --   --   --  0.0*    < > = values in this interval not displayed.     Results from last 7 days   Lab Units 03/14/24  0355   PH, ARTERIAL pH units 7.403   PO2 ART mm Hg 69.5*   PCO2, ARTERIAL mm Hg 44.1   HCO3 ART mmol/L 27.4*     Lab Results   Component Value Date    BLOODCX No growth at 4 days 03/10/2024     Lab Results   Component Value Date    URINECX 30,000-40,000 CFU/mL Normal Urogenital Preethi 05/06/2017       I reviewed the patient's new imaging including images and reports.      CT ABDOMEN PELVIS W CONTRAST, CT ANGIOGRAM CHEST    Date of Exam: 3/14/2024 9:24 AM EDT    Indication: bowel obstruction.    Comparison: Chest and abdomen radiograph from earlier today, CT abdomen/pelvis from March 11, 2024, and CT chest from March 10, 2024    Technique: Axial CT images were obtained of the chest abdomen and pelvis following the uneventful intravenous administration of 85 mL Isovue-370. Reconstructed coronal and sagittal images were also obtained. Automated exposure control and iterative  construction methods were used.      Findings:  Chest:    An endotracheal tube is in place. There are diffuse bilateral consolidations that have worsened from prior CT. There are small bilateral pleural effusions.    The heart is enlarged. The great vessels are normal in caliber. A left internal jugular catheter has its tip at the mid SVC. There is no evidence of pulmonary embolus. There is mediastinal lymphadenopathy, likely reactive.    No aggressive osseous lesions are identified.    Abdomen/pelvis:    The liver, gallbladder, adrenal glands, kidneys, spleen, and pancreas are unremarkable.    An NG tube has its tip in the stomach. The stomach is mildly distended. The small bowel appears normal in caliber and configuration. The colon appears normal. The appendix appears normal. There is no ascites or loculated collection. No abnormally  enlarged lymph nodes are identified.    The rectum and prostate are unremarkable. A urinary  Stroy catheter is in place. There is some wall thickening of the urinary bladder.    No aggressive osseous lesions are identified.   Impression:     Impression:  1.Negative for pulmonary embolus.  2.Diffuse bilateral consolidations have worsened from prior CT from March 10, 2024, suggesting pneumonia, pulmonary edema, and/or ARDS.  3.Small bilateral pleural effusions.  4.Cardiomegaly.  5.Mediastinal lymphadenopathy, likely reactive.  6.No evidence of bowel obstruction.  7.Some wall thickening of urinary bladder, which could represent cystitis.        Electronically Signed: Omkar Umana MD   3/14/2024 10:21 AM EDT         Interpretation Summary echocardiogram January 15, 2024         Left ventricular ejection fraction appears to be 56 - 60%.    Left ventricular wall thickness is consistent with hypertrophy.    Estimated right ventricular systolic pressure from tricuspid regurgitation is mildly elevated (35-45 mmHg).    There is a small (1-2cm) pericardial effusion.    No evidence for pericardial tamponade    All medications reviewed.   albuterol, 2.5 mg, Nebulization, Q6H - RT  amLODIPine, 10 mg, Nasogastric, Q24H  brimonidine, 1 drop, Both Eyes, TID   And  timolol, 1 drop, Both Eyes, BID  carvedilol, 12.5 mg, Nasogastric, Q12H  cefepime, 2,000 mg, Intravenous, Q12H  chlorhexidine, 15 mL, Mouth/Throat, Q12H  sennosides, 10 mL, Nasogastric, BID   And  docusate sodium, 100 mg, Nasogastric, BID  dornase alpha, 2.5 mg, Inhalation, BID - RT  furosemide, 100 mg, Intravenous, Once  hydrALAZINE, 100 mg, Nasogastric, Q8H  insulin detemir, 5 Units, Subcutaneous, Q12H  insulin regular, 2 Units, Subcutaneous, Q6H  itraconazole, 200 mg, Nasogastric, Q12H  itraconazole, 200 mg, Nasogastric, Once  pantoprazole, 40 mg, Intravenous, Q12H  polyethylene glycol, 17 g, Nasogastric, Daily  sodium chloride, 10 mL, Intravenous, Q12H  terazosin, 5 mg, Nasogastric, Q12H  Valproic Acid, 250 mg, Nasogastric, Q8H  vancomycin, 1,250 mg,  Intravenous, Q24H          Assessment & Plan       Healthcare-associated pneumonia    Essential hypertension    Neurocognitive disorder    AMS (altered mental status)    Dementia    Dysphagia    Type 2 diabetes mellitus with diabetic chronic kidney disease    Acute respiratory failure with hypoxia    Acute kidney injury superimposed on chronic kidney disease    Hyperkalemia    Bradycardia    Hypothermia    Acute respiratory failure with hypoxemia    Debilitated 68-year-old gentleman with diabetes mellitus complicated by blindness, osteomyelitis, chronic kidney disease, underlying dementia with psychosis, dependent for mobility, bathing and dressing, chronic anemia, heart failure with normal EF who is now admitted for his third admission in the last 2.5 months for an aspiration event with aspiration pneumonia.  On his previous admission a modified barium swallow did reveal moderate oral and mild pharyngeal dysphagia February 5, 2024.  Respiratory status deteriorated overnight on March 10 and he required intubation.  He initially was requiring 35% oxygen but over the last 48 hours his oxygenation and chest x-ray have worsened.  X-ray appears like ARDS with diffuse edema.      #1 acute respiratory failure, aspiration pneumonia he is currently on a rate of 18 tidal volume 420 PEEP of 8 and 80% FiO2.  ABG this am, pH of 7.40, CO2 44, O2 69.  FiO2 increased overnight from  50%-80%.  Chest x-ray shows worsening edema consistent with ARDS.  He has moderate secretions.  Gram stain shows yeast and many white cells but no bacteria.  MRSA PCR is positive.  Respiratory panel PCR is negative.  Admission procalcitonin is elevated at 5.72.  In February he was treated empirically for pneumonia with cefepime, linezolid.  He did not have positive cultures. He is on vancomycin and cefepime. Has a known history of dysphagia with an abnormal modified barium swallow in February of this year.  He apparently was a smoker up until nursing  home admission 2 years ago.  His CT scan does not reveal advanced COPD.  He was not on inhalers, or oxygen as an outpatient.   Progressive hypoxia is likely from his aspiration event with acute pneumonitis.  He has not shown bacteria on his Gram stain.  However his worsening infiltrates are likely secondary to chemical pneumonitis from his aspiration event.    -Increase PEEP to 12  -Add Sporanox as his respiratory cultures do have yeast  -Continue vancomycin, cefepime  -Continue bronchodilators  -Prone position, if I can place a post perennial tube  -Discussed permanent feeding tube placement with family given his recurrent aspiration pneumonias    #2 diabetes mellitus with chronic kidney disease, blindness, diabetic foot ulcers and osteomyelitis requiring toe amputation.  Blood glucoses are running 63-94 with tube feeding on hold.  Insulin doses have been cut back.  Nasogastric tube was apparently very difficult to place and it took 10 attempts.  Today his creatinine is 1.69, worse. Potassium was elevated at 6.8 and he has received several doses of Lokelma.  Most recent potassium is 3.6.  February 11 his creatinine was 1.63, which I suspect is baseline.  He did get IV contrast today to assess abdominal distention and possible ileus.  Renal ultrasound reveals right kidney measuring 12.1 cm, left measuring 11.3 cm, no hydronephrosis, normal corticomedullary differentiation.    -Nephrology following  -Stop Levemir and scheduled regular insulin  -Tube feeding on hold  -Gentle diuresis  -Continue to monitor creatinine, potassium  -May need to attempt postpyloric tube per gastroenterology    #3 dementia with psychosis, nursing home resident for 2 years.  Currently he is in a wheelchair needs assist to transfer, needs assist for bathing and dressing.  Home medications include Seroquel, Depakote, Abilify, Prozac, trazodone.  He did fall in 2023 and sustained a neck injury without fracture.  He had swelling in the  prevertebral space C2-C5.  He was placed in a cervical collar for ligamentous injuries.  Patient had been removing the collar on his own and cervical collar was removed.  Decision was made to leave the collar off as he had no acute fracture or dislocation.     -Continue Depakote  -Will restart some of his other psychiatric medications as we decrease sedation    #4 hypertension, congestive heart failure currently blood pressure is running 130-160.  He does take amlodipine, carvedilol, clonidine, hydralazine, torsemide as an outpatient.  Echocardiogram in January 2024 revealed an EF of 56 to 60% with some LVH and a small 1 to 2 cm pericardial effusion without evidence of tamponade.  He had some mild MR.     - hydralazine to 100 mg every 8 hours  - amlodipine 10 mg  -Start carvedilol 12.5 mg twice daily  -Hold clonidine,  while he is on Precedex      #5 anemia  He has a chronic anemia, with hemoglobin running 8-8.4.  Decrease hemoglobin to 6.8 overnight, transfusion 1 unit of packed cells today  No evidence of acute blood loss (melena, hematemesis)    #6 abdominal distention  KUB suspicious for bowel obstruction  CTA abdomen and pelvis, no bowel obstruction or ischemia  With his difficult nasogastric placement, need for potential proning, may asked for GI to perform endoscope to rule out upper GI source and to place a postpyloric feeding tube        VTE Prophylaxis: subcutaneous heparin    Stress Ulcer Prophylaxis: Protonix    Bibi Cervantes MD  03/14/24  12:05 EDT      Time: Critical care 35 min  I personally provided care to this critically ill patient as documented above.  Critical care time does not include time spent on separately billed procedures.  None of my critical care time was concurrent with other critical care providers.

## 2024-03-14 NOTE — BRIEF OP NOTE
ESOPHAGOGASTRODUODENOSCOPY WITH JEJUNAL TUBE INSERTION  Progress Note    Omkar Nava  3/14/2024    A 12 [place] NG tube was inserted over guidewire to the jejunum via the right nare.  KUB confirms general position.  EGD findings show normal esophagus, mild excess fluid in the stomach, normal duodenum, normal jejunum.    >> May use NJ tube for feeds and meds.    Mark I. Brunner, MD     Date: 3/14/2024  Time: 19:03 EDT

## 2024-03-14 NOTE — CONSULTS
[facility] Consult    Referring Provider: No ref. provider found    PCP: Deann Cao MD    Reason for Consultation: Placement of postpyloric feeding tube    History of present illness:    Omkar Nava is a 68 y.o. male, PMH includes CHF, HTN, dementia, T2DM, CKD, is admitted via ED 3/10 for evaluation of acute respiratory failure with hypoxia due to HCAP. Daughter and family member are at bedside.     Pt has chronic anemia (Hb 8.0-8.4), acutely worsened overnight, H/H 6.8/22.3. Pt is receiving 1u pRBC today. Intensivist has requested GI consult to rule out upper GI source of blood loss following difficult NG tube placement, and place NJ tube. Plan is to place patient prone after NJ tube is placed.     CT C / A / P w/ contrast 3/14: Negative for PE. Diffuse bilateral consolidations have worsened compared to 3/10. Small bilateral pleural effusions. No evidence of bowel obstruction.     No previous EGD / CSY.    Allergies:  Patient has no known allergies.    Scheduled Meds:  albuterol, 2.5 mg, Nebulization, Q6H - RT  amLODIPine, 10 mg, Nasogastric, Q24H  brimonidine, 1 drop, Both Eyes, TID   And  timolol, 1 drop, Both Eyes, BID  carvedilol, 12.5 mg, Nasogastric, Q12H  cefepime, 2,000 mg, Intravenous, Q12H  chlorhexidine, 15 mL, Mouth/Throat, Q12H  sennosides, 10 mL, Nasogastric, BID   And  docusate sodium, 100 mg, Nasogastric, BID  dornase alpha, 2.5 mg, Inhalation, BID - RT  hydrALAZINE, 100 mg, Nasogastric, Q8H  insulin regular, 2-9 Units, Subcutaneous, Q6H  itraconazole, 200 mg, Nasogastric, Q12H  itraconazole, 200 mg, Nasogastric, Once  pantoprazole, 40 mg, Intravenous, Q12H  polyethylene glycol, 17 g, Nasogastric, Daily  sodium chloride, 10 mL, Intravenous, Q12H  terazosin, 5 mg, Nasogastric, Q12H  Valproic Acid, 250 mg, Nasogastric, Q8H  vancomycin, 1,250 mg, Intravenous, Q24H         Infusions:  dexmedetomidine, 0.2-1.5 mcg/kg/hr (Order-Specific), Last Rate: 1.2 mcg/kg/hr (03/14/24  3755)  fentanyl 10 mcg/mL,  mcg/hr, Last Rate: Stopped (03/14/24 0393)  norepinephrine, 0.02-0.3 mcg/kg/min  Pharmacy to dose vancomycin,         PRN Meds:    [DISCONTINUED] senna-docusate sodium **AND** [DISCONTINUED] polyethylene glycol **AND** [DISCONTINUED] bisacodyl **AND** bisacodyl    dextrose    dextrose    fentaNYL    glucagon (human recombinant)    labetalol    nitroglycerin    Pharmacy to dose vancomycin    prochlorperazine    sodium chloride    sodium chloride    Home Meds:  Medications Prior to Admission   Medication Sig Dispense Refill Last Dose    acetaminophen (TYLENOL) 325 MG tablet Take 2 tablets by mouth Every 6 (Six) Hours As Needed for Mild Pain, Headache or Fever.   Past Week    albuterol sulfate  (90 Base) MCG/ACT inhaler Inhale 2 puffs Every 4 (Four) Hours As Needed for Wheezing or Shortness of Air.   Past Week    amLODIPine (NORVASC) 10 MG tablet Take 1 tablet by mouth Daily.   3/9/2024    ARIPiprazole (ABILIFY) 5 MG tablet Take 1 tablet by mouth Daily for 30 days. 30 tablet 0 3/9/2024    atorvastatin (LIPITOR) 20 MG tablet Take 1 tablet by mouth Every Night.   3/9/2024    Benzalkonium Chloride (Remedy Antimicrobial Cleanser) 0.12 % liquid Apply to americo area topically as needed for preventative use for americo care after each incontinent episode and as needed.   3/9/2024    brimonidine-timolol (COMBIGAN) 0.2-0.5 % ophthalmic solution Administer 1 drop to both eyes 2 (Two) Times a Day.   3/9/2024    carvedilol (COREG) 12.5 MG tablet Take 1 tablet by mouth 2 (Two) Times a Day With Meals for 30 days. 60 tablet 0 3/9/2024    Cholecalciferol 50 MCG (2000 UT) tablet Take 2 tablets by mouth Every Morning.   3/9/2024    cloNIDine (CATAPRES) 0.1 MG tablet Take 1 tablet by mouth Every 6 (Six) Hours As Needed for High Blood Pressure. AS NEEDED FOR SBP GREATER THAN 160   Past Week    dimethicone (Remedy Nutrashield) 1 % cream Apply to buttocks/Americo area topically as needed for preventative  Apply topically to buttocks/americo area.   Past Week    Dimethicone (Remedy Skin Repair) 1.5 % cream Apply to body topically as needed for dry skin every shift as needed.   Past Week    divalproex (DEPAKOTE) 125 MG DR tablet Take 3 tablets by mouth 2 (Two) Times a Day.   3/9/2024    docusate sodium (COLACE) 100 MG capsule Take 1 capsule by mouth 2 (Two) Times a Day As Needed for Constipation.   Past Week    famotidine (PEPCID) 20 MG tablet Take 1 tablet by mouth 2 (Two) Times a Day.   3/9/2024    ferrous sulfate 325 (65 FE) MG tablet Take 1 tablet by mouth Daily With Breakfast.   3/9/2024    FLUoxetine (PROzac) 20 MG capsule Take 1 capsule by mouth 2 (Two) Times a Day. Resume on 1/25/2024 30 capsule 0 3/9/2024    hydrALAZINE (APRESOLINE) 50 MG tablet Take 1 tablet by mouth 3 (Three) Times a Day for 30 days. 90 tablet 0 3/9/2024    Insulin Lispro (humaLOG) 100 UNIT/ML injection Inject 2-7 Units under the skin into the appropriate area as directed 4 (Four) Times a Day Before Meals & at Bedtime. (Patient taking differently: Inject  under the skin into the appropriate area as directed 4 (Four) Times a Day Before Meals & at Bedtime. Per sliding scale: If =0 units                                151-200=2 units                                201-250=3 units                                251-300=4 units                                301-350=5 units                                351-400=6 units                              If greater then 400 call MD and give 8 units SQ)   3/9/2024    melatonin 5 MG tablet tablet Take 1 tablet by mouth Every Night.   3/9/2024    QUEtiapine (SEROquel) 25 MG tablet Take 0.5 tablets by mouth Daily for 30 days. 15 tablet 0 3/9/2024    QUEtiapine (SEROquel) 25 MG tablet Take 1 tablet by mouth Every Night for 30 days. 30 tablet 0 3/9/2024    thiamine (VITAMIN B-1) 100 MG tablet  tablet Take 1 tablet by mouth 3 (Three) Times a Day.   3/9/2024    torsemide (DEMADEX) 10 MG tablet Take 1 tablet  "by mouth Daily.   3/9/2024    traZODone (DESYREL) 50 MG tablet Take 0.5 tablets by mouth Every Night for 30 days. 15 tablet 0 3/9/2024       ROS: Review of Systems   Unable to perform ROS: Intubated       PAST MED HX:  Past Medical History:   Diagnosis Date    Anemia     Dementia     Diabetes mellitus     Dysphagia     GERD (gastroesophageal reflux disease)     History of alcohol abuse     History of cocaine use     History of marijuana use     Hypertension     Osteomyelitis     Poor historian     records obtained from nursing home records & his family    Visual impairment        PAST SURG HX:  Past Surgical History:   Procedure Laterality Date    AMPUTATION FOOT Left 10/18/2022    Procedure: PARTIAL FIRST RAY AMPUTATION LEFT;  Surgeon: Yeison Petty MD;  Location:  [facility];  Service: Orthopedics;  Laterality: Left;    AMPUTATION FOOT Left 2022    Procedure: Transmetatarsal of Left Foot;  Surgeon: Yeison Petty MD;  Location:  [facility];  Service: Orthopedics;  Laterality: Left;    EYE SURGERY         FAM HX:  Family History   Problem Relation Age of Onset    Diabetes Mother     Hypertension Mother     Hypertension Father     Diabetes Father     Diabetes Sister     Hypertension Brother     Diabetes Brother     Diabetes Maternal Grandmother     Diabetes Maternal Grandfather     Hypertension Brother     Diabetes Brother        SOC HX:  Social History     Socioeconomic History    Marital status: Single   Tobacco Use    Smoking status: Former     Current packs/day: 0.00     Average packs/day: 1 pack/day for 40.0 years (40.0 ttl pk-yrs)     Types: Cigarettes     Start date: 1977     Quit date: 2017     Years since quittin.8    Smokeless tobacco: Never   Vaping Use    Vaping status: Never Used   Substance and Sexual Activity    Alcohol use: Not Currently     Comment: histgry of alcohol abuse    Drug use: Yes     Types: Marijuana, \"Crack\" cocaine     Comment: PATIENT STATES \"IT'S BEEN A FEW DAYS\"    " "Sexual activity: Defer       PHYSICAL EXAM  /64   Pulse 72   Temp 98.8 °F (37.1 °C)   Resp 20   Ht 151.9 cm (59.8\")   Wt 92.8 kg (204 lb 9.4 oz)   SpO2 93%   BMI 40.22 kg/m²   Wt Readings from Last 3 Encounters:   03/13/24 92.8 kg (204 lb 9.4 oz)   02/22/24 84.8 kg (187 lb)   02/05/24 85.1 kg (187 lb 9.8 oz)   ,body mass index is 40.22 kg/m².  Physical Exam  Vitals and nursing note reviewed.   Constitutional:       General: He is not in acute distress.     Appearance: Normal appearance. He is ill-appearing (chronically). He is not diaphoretic.      Interventions: He is sedated and intubated.   HENT:      Head: Normocephalic and atraumatic.      Right Ear: External ear normal.      Left Ear: External ear normal.      Nose: Nose normal.      Comments: NG tube in place     Mouth/Throat:      Mouth: Mucous membranes are moist.      Pharynx: Oropharynx is clear.      Comments: ET tube in place  Eyes:      Conjunctiva/sclera: Conjunctivae normal.      Pupils: Pupils are equal, round, and reactive to light.   Cardiovascular:      Rate and Rhythm: Normal rate and regular rhythm.      Pulses: Normal pulses.      Heart sounds: Normal heart sounds.   Pulmonary:      Effort: Pulmonary effort is normal. He is intubated.      Breath sounds: Normal breath sounds.   Abdominal:      General: Abdomen is flat. Bowel sounds are normal. There is distension.      Tenderness: There is no abdominal tenderness. There is no guarding or rebound.   Musculoskeletal:         General: Normal range of motion.      Cervical back: Normal range of motion.      Right lower leg: No edema.      Left lower leg: No edema.   Skin:     General: Skin is warm and dry.      Coloration: Skin is not jaundiced or pale.         Results Review:   I reviewed the patient's new clinical results.  I reviewed the patient's new imaging results and agree with the interpretation.  I reviewed the patient's other test results and agree with the " interpretation    Lab Results   Component Value Date    WBC 10.64 03/14/2024    HGB 7.7 (L) 03/14/2024    HGB 6.8 (C) 03/14/2024    HGB 7.9 (L) 03/12/2024    HCT 24.7 (L) 03/14/2024    MCV 91.0 03/14/2024     (L) 03/14/2024       Lab Results   Component Value Date    INR 1.38 (H) 03/12/2024    INR 1.08 10/18/2022    INR 1.02 10/14/2022       Lab Results   Component Value Date    GLUCOSE 63 (L) 03/14/2024    BUN 35 (H) 03/14/2024    CREATININE 1.69 (H) 03/14/2024    EGFRIFNONA >60 07/25/2022    EGFRIFAFRI >60 07/25/2022    BCR 20.7 03/14/2024     03/14/2024    K 3.6 03/14/2024    CO2 25.0 03/14/2024    CALCIUM 8.7 03/14/2024    ALBUMIN 3.1 (L) 03/14/2024    ALBUMIN 3.2 (L) 03/14/2024    ALKPHOS 83 03/14/2024    BILITOT 0.2 03/14/2024    BILIDIR <0.2 03/14/2024    ALT 33 03/14/2024    AST 30 03/14/2024       ASSESSMENTS/PLANS    Acute hypoxic respiratory failure with hypoxia 2/2 HCAP  Acute on chronic anemia  JAYY on CKD  Abdominal distention  Difficult NG tube placement   - previous labs, imaging, hospital records reviewed   - continue BID PPI   - plan for bedside EGD with NJ tube placement today with Dr. Brunner    I discussed the patient's findings and my recommendations with family, nursing staff, and consulting provider. Thank you very kindly for this consultation. Will continue to follow during this hospitalization.      Johana Kelly PA-C  03/14/24  13:58 EDT

## 2024-03-14 NOTE — PROGRESS NOTES
Clinical Nutrition     Nutrition Support Assessment  Reason for Visit: Physician consult, EN    Patient Name: Omkar Nava  YOB: 1955  MRN: [id number]  Date of Encounter: 03/14/24 07:51 EDT  Admission date: 3/10/2024    Comments:  Patient with issues with abdominal distention yesterday. EN was still running this morning. RN turned it off and took patient for CT of abdomen and KUB.  KUB suggestive of stool burden, diffuse gas.  CT of abdomen negative for obstruction.  RN reports she is giving ordered bowel regiment this morning.      Recommend keep on hold for today and continue with bowel regiment as ordered   Recommend repeat KUB tomorrow prior to starting EN to better assess GI status.      Nutrition Assessment   Admission Diagnosis:  Healthcare-associated pneumonia [J18.9]      Problem List:    Healthcare-associated pneumonia    Essential hypertension    Neurocognitive disorder    AMS (altered mental status)    Dementia    Dysphagia    Type 2 diabetes mellitus with diabetic chronic kidney disease    Acute respiratory failure with hypoxia    Acute kidney injury superimposed on chronic kidney disease    Hyperkalemia    Bradycardia    Hypothermia        PMH:   He  has a past medical history of Anemia, Dementia, Diabetes mellitus, Dysphagia, GERD (gastroesophageal reflux disease), History of alcohol abuse, History of cocaine use, History of marijuana use, Hypertension, Osteomyelitis, Poor historian, and Visual impairment.    PSH:  He  has a past surgical history that includes Eye surgery; Foot Amputation (Left, 10/18/2022); and Foot Amputation (Left, 12/5/2022).      Applicable Nutrition Concerns:   Skin:  Oral: history of dysphagia - came in with chocking incident   GI: came with constipation; improved     Applicable Interval History:   (3/10) Intubated   (3/11) EN started - [facility]     Reported/Observed/Food/Nutrition Related History:   3/14) Remains intubated, off  sedation.  Overall improved renal status.  Patient was taken to CT of abdomen this morning; not clear reason, no documentation of any GI issues.    Discussed with RN caring for patient today. Reports patient was starting to have abdominal distention yesterday, EN was turned off; turned back on at some point. She noted patient with increased distention this morning which was not the case yesterday.  CT of abdomen and KUB results noted. It seem patient is still with significant stool burden.  RN giving ordered bowel regiment now - not given yesterday per MAR records?     3/11) Pt presents in ARF, intubated/sedated. Intensivist consulted for EN and has already placed order for [employer] which RD concurs with. Pt from NH and came to ED after choking incident. Pt with severe dementia and aggression. Advanced chronic diseases HF, CKD, DM, dysphagia. Pt is blind.   Pt has NG. Did have some emesis on POA, KUB showed stool burden, pt now has had several BMs. No further emesis. Pt with significantly high Potassium on POA, improved with several doses lokelma to 6.0 today. Renal fx labs poor, A/C renal failure per Nephrology. Pt was receiving propofol but this was stopped, plan to transition to precedex and fentanyl. Pt was also receiving NS @ 100 ml/hr which was d/c this am. No current vasopressor support.     Labs    Labs Reviewed: Yes     Results from last 7 days   Lab Units 03/14/24  0358 03/13/24  0351 03/12/24  0406 03/11/24  1228 03/11/24  0424 03/10/24  1241 03/10/24  0944 03/10/24  0919 03/10/24  0912   GLUCOSE mg/dL 63* 213* 217*   < > 103*   < > 147*  --   --    BUN mg/dL 35* 35* 48*   < > 64*   < > 63*  --   --    CREATININE mg/dL 1.69* 1.47* 1.87*   < > 2.29*   < > 1.78*   < >  --    SODIUM mmol/L 143 142 141   < > 143   < > 139  --   --    CHLORIDE mmol/L 109* 109* 110*   < > 111*   < > 111*  --   --    POTASSIUM mmol/L 3.6 4.0 5.1   < > 5.7*   < > 7.4*  --   --    PHOSPHORUS mg/dL 3.3 2.8 2.9  --  3.4   < >  --   " --   --    MAGNESIUM mg/dL  --   --   --   --  2.4  --   --   --   --    ALT (SGPT) U/L 33  --   --   --  46*  --  46*  --   --    LACTATE mmol/L  --   --   --   --   --   --   --   --  1.0    < > = values in this interval not displayed.       Results from last 7 days   Lab Units 03/14/24  0358 03/13/24  0351 03/11/24  0424   ALBUMIN g/dL 3.2*  3.1* 3.3* 3.4*       Results from last 7 days   Lab Units 03/14/24  0524 03/13/24  2336 03/13/24  1729 03/13/24  1147 03/13/24  0535 03/12/24  2321   GLUCOSE mg/dL 75 138* 97 168* 209* 185*     Lab Results   Lab Value Date/Time    HGBA1C 7.00 (H) 10/16/2022 0432    HGBA1C 8.7 (H) 06/25/2022 0242    HGBA1C 13.4 04/14/2022 1058         Results from last 7 days   Lab Units 03/10/24  0944   PROBNP pg/mL 434.3         Medications    Medications Reviewed: Yes  Pertinent: colace, levemir 5units Q12hrs, insulin 2 units Q6hrs, Protonix, Miralax, senokot,   Infusion: precedex   PRN:     Intake/Ouptut 24 hrs (0701 - 0700)   I&O's Reviewed: Yes   Urine: 2586ml  Stool: No documented BM yesterday; lat BM 3/13    Anthropometrics     Height: Height: 151.9 cm (59.8\")  Last Filed Weight: Weight: 92.8 kg (204 lb 9.4 oz) (03/13/24 0600)  Method: Weight Method: Bed scale  BMI: BMI (Calculated): 40.2  BMI classification: Obese Class II: 35-39.9kg/m2  IBW:      UBW: fluctuates 2/2 CKD ~180-200 lb since 2022   Weight       Weight (kg) Weight (lbs) Weight Method Visit Report   8/9/2023 88.905 kg  196 lb  Bed scale     1/15/2024 93.441 kg  206 lb  Estimated     1/16/2024 90.855 kg  200 lb 4.8 oz  Bed scale      90 kg  198 lb 6.6 oz      1/17/2024 95.21 kg  209 lb 14.4 oz      1/18/2024 85.548 kg  188 lb 9.6 oz  Bed scale     1/19/2024 82.691 kg  182 lb 4.8 oz  Bed scale     1/20/2024 83.054 kg  183 lb 1.6 oz  Bed scale     1/21/2024 85.14 kg  187 lb 11.2 oz  Bed scale     2/5/2024 85.1 kg  187 lb 9.8 oz  Estimated     2/22/2024 84.823 kg  187 lb   --    3/10/2024 84.823 kg  187 lb  Stated      98.1 " "kg  216 lb 4.3 oz  Bed scale     3/11/2024 90 kg  198 lb 6.6 oz  Bed scale       Weight change: no significant changes    Nutrition Focused Physical Exam     Date: 3/11    Unable to perform exam due to: Pt unable to participate at time of visit, Defer pending indication    Needs Assessment   Date: 3/11    Height used:Height: 151.9 cm (59.8\")  Weights used: 85 kg / 187 lb (ABW), 53 kg / 117 lb (IBW)  *suspected dry weight likely ~185 lb given previously documented including recent MDOV weight. Likely currently holding fluid, will continue to monitor.    Estimated Calorie needs: ~1550 Kcal/day while on vent  Method:  Kcals/KG 15-20 = 2646-9613  Method:  PSU (3/11 Tmax-36.6, Ve-7.37) = 1544    Estimated Protein needs: ~85 g PRO/day with current renal fx  Method: 1 g/Kg ABW = 85  Method: 2 g/Kg IBW = 106    Estimated Fluid needs: ~ ml/day   Per clinical status    Current Nutrition Prescription     PO: NPO Diet NPO Type: Strict NPO  Oral Nutrition Supplement:   Intake: N/A    EN: [facility]  Goal Rate: 40ml/hr   Water Flushes: 10ml/hr  Modular: None  Route: NG  Tube: Large bore    At goal over: 20Hrs/day    Rx will supply:   Goal Volume 800  mL/day     Flush Volume 200 mL/day     Energy 1600 Kcal/day 103 % Est Need   Protein 73 g/day 85 % Est Need   Fiber 0 g/day     Water in   mL     Total Water 776 mL     Meet DRI No        --------------------------------------------------------------------------  Product/Rate verified at bedside: Yes on hold   Infusing Rate at time of visit:     Average Delivery from [facility] Day:  Volume 791 mL/day   % Goal Vol.   Flush Volume 242 mL/day     Energy 1582 Kcal/day 102 % Est Need   Protein 72 g/day 85 % Est Need   Fiber 0 g/day     Water in   mL     Total Water 812 mL     Meet DRI No          Nutrition Diagnosis   Date: 3/11  Updated: 3/14  Problem Inadequate oral intake   Etiology ARF requiring mechanical ventilation   Signs/Symptoms Intubated/sedated, NPO "   Status: EN meeting needs     Date: 3/11  Updated: 3/14  Problem Altered nutrition related laboratory values   Etiology A/C Renal Failure   Signs/Symptoms Creatinine 2.02, BUN 57, K 6.0   Status: Improving     Goal:   General: Nutrition to support treatment  PO: Advance diet as medically feasible/appropriate  EN/PN: Maintain EN    Nutrition Intervention      Follow treatment progress, Care plan reviewed    Recommend keep on hold for today and continue with bowel regiment as ordered.    Recommend repeat KUB tomorrow prior to starting EN to better assess GI status.      Monitoring/Evaluation:   Per protocol, I&O, Pertinent labs, EN delivery/tolerance, Weight, GI status, Symptoms, POC/GOC, Swallow function, Hemodynamic stability      Trinidad Wilder RD, CNSC  Time Spent: 35m

## 2024-03-15 ENCOUNTER — APPOINTMENT (OUTPATIENT)
Dept: GENERAL RADIOLOGY | Facility: HOSPITAL | Age: 69
End: 2024-03-15
Payer: MEDICARE

## 2024-03-15 LAB
ANA SER QL: NEGATIVE
ANION GAP SERPL CALCULATED.3IONS-SCNC: 9 MMOL/L (ref 5–15)
ARTERIAL PATENCY WRIST A: ABNORMAL
ATMOSPHERIC PRESS: ABNORMAL MM[HG]
BACTERIA SPEC AEROBE CULT: NORMAL
BASE EXCESS BLDA CALC-SCNC: 2.8 MMOL/L (ref 0–2)
BDY SITE: ABNORMAL
BH BB BLOOD EXPIRATION DATE: NORMAL
BH BB BLOOD EXPIRATION DATE: NORMAL
BH BB BLOOD TYPE BARCODE: 5100
BH BB BLOOD TYPE BARCODE: 5100
BH BB DISPENSE STATUS: NORMAL
BH BB DISPENSE STATUS: NORMAL
BH BB PRODUCT CODE: NORMAL
BH BB PRODUCT CODE: NORMAL
BH BB UNIT NUMBER: NORMAL
BH BB UNIT NUMBER: NORMAL
BODY TEMPERATURE: 37
BUN SERPL-MCNC: 37 MG/DL (ref 8–23)
BUN/CREAT SERPL: 19.2 (ref 7–25)
C-ANCA TITR SER IF: NORMAL TITER
CALCIUM SPEC-SCNC: 8.7 MG/DL (ref 8.6–10.5)
CHLORIDE SERPL-SCNC: 109 MMOL/L (ref 98–107)
CO2 BLDA-SCNC: 29.3 MMOL/L (ref 22–33)
CO2 SERPL-SCNC: 26 MMOL/L (ref 22–29)
COHGB MFR BLD: 1.1 % (ref 0–2)
CREAT SERPL-MCNC: 1.93 MG/DL (ref 0.76–1.27)
CROSSMATCH INTERPRETATION: NORMAL
CROSSMATCH INTERPRETATION: NORMAL
DEPRECATED RDW RBC AUTO: 57.3 FL (ref 37–54)
EGFRCR SERPLBLD CKD-EPI 2021: 37.7 ML/MIN/1.73
EPAP: 0
ERYTHROCYTE [DISTWIDTH] IN BLOOD BY AUTOMATED COUNT: 17.7 % (ref 12.3–15.4)
GLUCOSE BLDC GLUCOMTR-MCNC: 133 MG/DL (ref 70–130)
GLUCOSE BLDC GLUCOMTR-MCNC: 178 MG/DL (ref 70–130)
GLUCOSE BLDC GLUCOMTR-MCNC: 196 MG/DL (ref 70–130)
GLUCOSE BLDC GLUCOMTR-MCNC: 75 MG/DL (ref 70–130)
GLUCOSE SERPL-MCNC: 68 MG/DL (ref 65–99)
HCO3 BLDA-SCNC: 27.9 MMOL/L (ref 20–26)
HCT VFR BLD AUTO: 26.6 % (ref 37.5–51)
HCT VFR BLD CALC: 38.3 % (ref 38–51)
HGB BLD-MCNC: 8.6 G/DL (ref 13–17.7)
HGB BLDA-MCNC: 12.5 G/DL (ref 13.5–17.5)
INHALED O2 CONCENTRATION: 50 %
IPAP: 0
MAGNESIUM SERPL-MCNC: 1.9 MG/DL (ref 1.6–2.4)
MCH RBC QN AUTO: 28.8 PG (ref 26.6–33)
MCHC RBC AUTO-ENTMCNC: 32.3 G/DL (ref 31.5–35.7)
MCV RBC AUTO: 89 FL (ref 79–97)
METHGB BLD QL: 0.2 % (ref 0–1.5)
MODALITY: ABNORMAL
MYELOPEROXIDASE AB SER IA-ACNC: <0.2 UNITS (ref 0–0.9)
OXYHGB MFR BLDV: 96.1 % (ref 94–99)
P-ANCA ATYPICAL TITR SER IF: NORMAL TITER
P-ANCA TITR SER IF: NORMAL TITER
PAW @ PEAK INSP FLOW SETTING VENT: 0 CMH2O
PCO2 BLDA: 44.1 MM HG (ref 35–45)
PCO2 TEMP ADJ BLD: 44.1 MM HG (ref 35–48)
PEEP RESPIRATORY: 12 CM[H2O]
PH BLDA: 7.41 PH UNITS (ref 7.35–7.45)
PH, TEMP CORRECTED: 7.41 PH UNITS
PHOSPHATE SERPL-MCNC: 3.7 MG/DL (ref 2.5–4.5)
PLATELET # BLD AUTO: 110 10*3/MM3 (ref 140–450)
PMV BLD AUTO: 11.4 FL (ref 6–12)
PO2 BLDA: 87.1 MM HG (ref 83–108)
PO2 TEMP ADJ BLD: 87.1 MM HG (ref 83–108)
POTASSIUM SERPL-SCNC: 3.4 MMOL/L (ref 3.5–5.2)
POTASSIUM SERPL-SCNC: 4 MMOL/L (ref 3.5–5.2)
PROCALCITONIN SERPL-MCNC: 6.48 NG/ML (ref 0–0.25)
PROTEINASE3 AB SER IA-ACNC: 0.2 UNITS (ref 0–0.9)
RBC # BLD AUTO: 2.99 10*6/MM3 (ref 4.14–5.8)
SODIUM SERPL-SCNC: 144 MMOL/L (ref 136–145)
TOTAL RATE: 18 BREATHS/MINUTE
UNIT  ABO: NORMAL
UNIT  ABO: NORMAL
UNIT  RH: NORMAL
UNIT  RH: NORMAL
VANCOMYCIN SERPL-MCNC: 24.6 MCG/ML (ref 5–40)
VENTILATOR MODE: ABNORMAL
VT ON VENT VENT: 0.42 ML
WBC NRBC COR # BLD AUTO: 11.62 10*3/MM3 (ref 3.4–10.8)

## 2024-03-15 PROCEDURE — 25010000002 FUROSEMIDE PER 20 MG: Performed by: INTERNAL MEDICINE

## 2024-03-15 PROCEDURE — 71045 X-RAY EXAM CHEST 1 VIEW: CPT

## 2024-03-15 PROCEDURE — 94003 VENT MGMT INPAT SUBQ DAY: CPT

## 2024-03-15 PROCEDURE — 94799 UNLISTED PULMONARY SVC/PX: CPT

## 2024-03-15 PROCEDURE — 36600 WITHDRAWAL OF ARTERIAL BLOOD: CPT

## 2024-03-15 PROCEDURE — 85027 COMPLETE CBC AUTOMATED: CPT | Performed by: NURSE PRACTITIONER

## 2024-03-15 PROCEDURE — 82805 BLOOD GASES W/O2 SATURATION: CPT

## 2024-03-15 PROCEDURE — 25010000002 CEFEPIME PER 500 MG: Performed by: INTERNAL MEDICINE

## 2024-03-15 PROCEDURE — 80048 BASIC METABOLIC PNL TOTAL CA: CPT | Performed by: NURSE PRACTITIONER

## 2024-03-15 PROCEDURE — 63710000001 INSULIN REGULAR HUMAN PER 5 UNITS: Performed by: INTERNAL MEDICINE

## 2024-03-15 PROCEDURE — 80202 ASSAY OF VANCOMYCIN: CPT

## 2024-03-15 PROCEDURE — 82375 ASSAY CARBOXYHB QUANT: CPT

## 2024-03-15 PROCEDURE — 84132 ASSAY OF SERUM POTASSIUM: CPT

## 2024-03-15 PROCEDURE — 25810000003 SODIUM CHLORIDE 0.9 % SOLUTION: Performed by: INTERNAL MEDICINE

## 2024-03-15 PROCEDURE — 94761 N-INVAS EAR/PLS OXIMETRY MLT: CPT

## 2024-03-15 PROCEDURE — 25010000002 FENTANYL CITRATE (PF) 2500 MCG/50ML SOLUTION: Performed by: INTERNAL MEDICINE

## 2024-03-15 PROCEDURE — 84100 ASSAY OF PHOSPHORUS: CPT | Performed by: NURSE PRACTITIONER

## 2024-03-15 PROCEDURE — 84145 PROCALCITONIN (PCT): CPT

## 2024-03-15 PROCEDURE — 83050 HGB METHEMOGLOBIN QUAN: CPT

## 2024-03-15 PROCEDURE — 82948 REAGENT STRIP/BLOOD GLUCOSE: CPT

## 2024-03-15 PROCEDURE — 99291 CRITICAL CARE FIRST HOUR: CPT | Performed by: INTERNAL MEDICINE

## 2024-03-15 PROCEDURE — 83735 ASSAY OF MAGNESIUM: CPT | Performed by: NURSE PRACTITIONER

## 2024-03-15 RX ORDER — POLYETHYLENE GLYCOL 3350 17 G/17G
17 POWDER, FOR SOLUTION ORAL 2 TIMES DAILY
Status: DISCONTINUED | OUTPATIENT
Start: 2024-03-15 | End: 2024-03-22

## 2024-03-15 RX ORDER — FUROSEMIDE 10 MG/ML
40 INJECTION INTRAMUSCULAR; INTRAVENOUS EVERY 12 HOURS
Status: DISCONTINUED | OUTPATIENT
Start: 2024-03-15 | End: 2024-03-16

## 2024-03-15 RX ORDER — POTASSIUM CHLORIDE 1.5 G/1.58G
40 POWDER, FOR SOLUTION ORAL ONCE
Status: COMPLETED | OUTPATIENT
Start: 2024-03-15 | End: 2024-03-15

## 2024-03-15 RX ORDER — DEXTROSE 20 G/100ML
25 INJECTION, SOLUTION INTRAVENOUS CONTINUOUS
Status: DISCONTINUED | OUTPATIENT
Start: 2024-03-15 | End: 2024-03-15

## 2024-03-15 RX ADMIN — VALPROIC ACID 250 MG: 250 SOLUTION ORAL at 21:02

## 2024-03-15 RX ADMIN — TIMOLOL MALEATE 1 DROP: 5 SOLUTION/ DROPS OPHTHALMIC at 08:39

## 2024-03-15 RX ADMIN — ALBUTEROL SULFATE 2.5 MG: 2.5 SOLUTION RESPIRATORY (INHALATION) at 13:12

## 2024-03-15 RX ADMIN — WHITE PETROLATUM 57.7 %-MINERAL OIL 31.9 % EYE OINTMENT: at 14:14

## 2024-03-15 RX ADMIN — FUROSEMIDE 40 MG: 10 INJECTION, SOLUTION INTRAMUSCULAR; INTRAVENOUS at 12:45

## 2024-03-15 RX ADMIN — DEXMEDETOMIDINE HYDROCHLORIDE 1.4 MCG/KG/HR: 400 INJECTION, SOLUTION INTRAVENOUS at 21:45

## 2024-03-15 RX ADMIN — DEXMEDETOMIDINE HYDROCHLORIDE 1.4 MCG/KG/HR: 400 INJECTION, SOLUTION INTRAVENOUS at 01:33

## 2024-03-15 RX ADMIN — DEXMEDETOMIDINE HYDROCHLORIDE 1.4 MCG/KG/HR: 400 INJECTION, SOLUTION INTRAVENOUS at 18:40

## 2024-03-15 RX ADMIN — HYDRALAZINE HYDROCHLORIDE 100 MG: 50 TABLET ORAL at 21:02

## 2024-03-15 RX ADMIN — FENTANYL CITRATE 200 MCG/HR: 50 INJECTION, SOLUTION INTRAMUSCULAR; INTRAVENOUS at 14:14

## 2024-03-15 RX ADMIN — SENNOSIDES 10 ML: 8.8 LIQUID ORAL at 20:44

## 2024-03-15 RX ADMIN — BRIMONIDINE TARTRATE 1 DROP: 2 SOLUTION/ DROPS OPHTHALMIC at 15:58

## 2024-03-15 RX ADMIN — TIMOLOL MALEATE 1 DROP: 5 SOLUTION/ DROPS OPHTHALMIC at 20:44

## 2024-03-15 RX ADMIN — DEXTROSE MONOHYDRATE 25 ML/HR: 70 INJECTION, SOLUTION INTRAVENOUS at 02:18

## 2024-03-15 RX ADMIN — ALBUTEROL SULFATE 2.5 MG: 2.5 SOLUTION RESPIRATORY (INHALATION) at 07:10

## 2024-03-15 RX ADMIN — WHITE PETROLATUM 57.7 %-MINERAL OIL 31.9 % EYE OINTMENT: at 02:18

## 2024-03-15 RX ADMIN — TERAZOSIN HYDROCHLORIDE ANHYDROUS 5 MG: 5 CAPSULE ORAL at 08:39

## 2024-03-15 RX ADMIN — ITRACONAZOLE 200 MG: 10 SOLUTION ORAL at 04:32

## 2024-03-15 RX ADMIN — PANTOPRAZOLE SODIUM 40 MG: 40 INJECTION, POWDER, FOR SOLUTION INTRAVENOUS at 20:43

## 2024-03-15 RX ADMIN — BRIMONIDINE TARTRATE 1 DROP: 2 SOLUTION/ DROPS OPHTHALMIC at 08:40

## 2024-03-15 RX ADMIN — DORNASE ALFA 2.5 MG: 1 SOLUTION RESPIRATORY (INHALATION) at 19:21

## 2024-03-15 RX ADMIN — ALBUTEROL SULFATE 2.5 MG: 2.5 SOLUTION RESPIRATORY (INHALATION) at 00:19

## 2024-03-15 RX ADMIN — CARVEDILOL 12.5 MG: 12.5 TABLET, FILM COATED ORAL at 20:45

## 2024-03-15 RX ADMIN — DEXMEDETOMIDINE HYDROCHLORIDE 1.2 MCG/KG/HR: 400 INJECTION, SOLUTION INTRAVENOUS at 08:38

## 2024-03-15 RX ADMIN — Medication 10 ML: at 08:40

## 2024-03-15 RX ADMIN — WHITE PETROLATUM 57.7 %-MINERAL OIL 31.9 % EYE OINTMENT: at 21:02

## 2024-03-15 RX ADMIN — WHITE PETROLATUM 57.7 %-MINERAL OIL 31.9 % EYE OINTMENT: at 10:16

## 2024-03-15 RX ADMIN — WHITE PETROLATUM 57.7 %-MINERAL OIL 31.9 % EYE OINTMENT: at 05:56

## 2024-03-15 RX ADMIN — POLYETHYLENE GLYCOL 3350 17 G: 17 POWDER, FOR SOLUTION ORAL at 20:44

## 2024-03-15 RX ADMIN — DEXTROSE MONOHYDRATE 25 ML/HR: 70 INJECTION, SOLUTION INTRAVENOUS at 14:13

## 2024-03-15 RX ADMIN — DORNASE ALFA 2.5 MG: 1 SOLUTION RESPIRATORY (INHALATION) at 07:10

## 2024-03-15 RX ADMIN — DOCUSATE SODIUM 100 MG: 50 LIQUID ORAL at 08:39

## 2024-03-15 RX ADMIN — HUMAN INSULIN 2 UNITS: 100 INJECTION, SOLUTION SUBCUTANEOUS at 12:45

## 2024-03-15 RX ADMIN — ITRACONAZOLE 200 MG: 10 SOLUTION ORAL at 15:59

## 2024-03-15 RX ADMIN — Medication 10 ML: at 20:44

## 2024-03-15 RX ADMIN — DEXMEDETOMIDINE HYDROCHLORIDE 1.4 MCG/KG/HR: 400 INJECTION, SOLUTION INTRAVENOUS at 15:57

## 2024-03-15 RX ADMIN — DEXMEDETOMIDINE HYDROCHLORIDE 1.4 MCG/KG/HR: 400 INJECTION, SOLUTION INTRAVENOUS at 05:55

## 2024-03-15 RX ADMIN — HUMAN INSULIN 2 UNITS: 100 INJECTION, SOLUTION SUBCUTANEOUS at 18:10

## 2024-03-15 RX ADMIN — FENTANYL CITRATE 200 MCG/HR: 50 INJECTION, SOLUTION INTRAMUSCULAR; INTRAVENOUS at 01:34

## 2024-03-15 RX ADMIN — DEXMEDETOMIDINE HYDROCHLORIDE 1.4 MCG/KG/HR: 400 INJECTION, SOLUTION INTRAVENOUS at 03:22

## 2024-03-15 RX ADMIN — CARVEDILOL 12.5 MG: 12.5 TABLET, FILM COATED ORAL at 08:39

## 2024-03-15 RX ADMIN — POTASSIUM CHLORIDE 40 MEQ: 1.5 POWDER, FOR SOLUTION ORAL at 11:57

## 2024-03-15 RX ADMIN — CEFEPIME 2000 MG: 2 INJECTION, POWDER, FOR SOLUTION INTRAVENOUS at 05:55

## 2024-03-15 RX ADMIN — 0.12% CHLORHEXIDINE GLUCONATE 15 ML: 1.2 RINSE ORAL at 20:43

## 2024-03-15 RX ADMIN — SENNOSIDES 10 ML: 8.8 LIQUID ORAL at 08:39

## 2024-03-15 RX ADMIN — DEXMEDETOMIDINE HYDROCHLORIDE 0.8 MCG/KG/HR: 400 INJECTION, SOLUTION INTRAVENOUS at 11:57

## 2024-03-15 RX ADMIN — VALPROIC ACID 250 MG: 250 SOLUTION ORAL at 14:13

## 2024-03-15 RX ADMIN — DOCUSATE SODIUM 100 MG: 50 LIQUID ORAL at 20:44

## 2024-03-15 RX ADMIN — POLYETHYLENE GLYCOL 3350 17 G: 17 POWDER, FOR SOLUTION ORAL at 08:39

## 2024-03-15 RX ADMIN — VALPROIC ACID 250 MG: 250 SOLUTION ORAL at 05:56

## 2024-03-15 RX ADMIN — BRIMONIDINE TARTRATE 1 DROP: 2 SOLUTION/ DROPS OPHTHALMIC at 20:44

## 2024-03-15 RX ADMIN — PANTOPRAZOLE SODIUM 40 MG: 40 INJECTION, POWDER, FOR SOLUTION INTRAVENOUS at 08:39

## 2024-03-15 RX ADMIN — HYDRALAZINE HYDROCHLORIDE 100 MG: 50 TABLET ORAL at 05:56

## 2024-03-15 RX ADMIN — AMLODIPINE BESYLATE 10 MG: 10 TABLET ORAL at 08:40

## 2024-03-15 RX ADMIN — TERAZOSIN HYDROCHLORIDE ANHYDROUS 5 MG: 5 CAPSULE ORAL at 20:44

## 2024-03-15 RX ADMIN — ALBUTEROL SULFATE 2.5 MG: 2.5 SOLUTION RESPIRATORY (INHALATION) at 18:55

## 2024-03-15 RX ADMIN — WHITE PETROLATUM 57.7 %-MINERAL OIL 31.9 % EYE OINTMENT: at 18:11

## 2024-03-15 RX ADMIN — 0.12% CHLORHEXIDINE GLUCONATE 15 ML: 1.2 RINSE ORAL at 08:39

## 2024-03-15 RX ADMIN — HYDRALAZINE HYDROCHLORIDE 100 MG: 50 TABLET ORAL at 14:13

## 2024-03-15 RX ADMIN — CEFEPIME 2000 MG: 2 INJECTION, POWDER, FOR SOLUTION INTRAVENOUS at 18:10

## 2024-03-15 NOTE — PLAN OF CARE
Problem: Skin Injury Risk Increased  Goal: Skin Health and Integrity  Outcome: Ongoing, Progressing  Intervention: Optimize Skin Protection     Problem: Fall Injury Risk  Goal: Absence of Fall and Fall-Related Injury  Outcome: Ongoing, Progressing  Intervention: Identify and Manage Contributors  Intervention: Promote Injury-Free Environment     Problem: Adult Inpatient Plan of Care  Goal: Plan of Care Review  Outcome: Ongoing, Progressing  Goal: Patient-Specific Goal (Individualized)  Outcome: Ongoing, Progressing  Goal: Absence of Hospital-Acquired Illness or Injury  Outcome: Ongoing, Progressing  Intervention: Identify and Manage Fall Risk  Intervention: Prevent Skin Injury  Intervention: Prevent and Manage VTE (Venous Thromboembolism) Risk  Intervention: Prevent Infection  Goal: Optimal Comfort and Wellbeing  Outcome: Ongoing, Progressing  Intervention: Monitor Pain and Promote Comfort  Intervention: Provide Person-Centered Care  Goal: Readiness for Transition of Care  Outcome: Ongoing, Progressing     Problem: Communication Impairment (Mechanical Ventilation, Invasive)  Goal: Effective Communication  Outcome: Ongoing, Progressing  Intervention: Ensure Effective Communication     Problem: Device-Related Complication Risk (Mechanical Ventilation, Invasive)  Goal: Optimal Device Function  Outcome: Ongoing, Progressing  Intervention: Optimize Device Care and Function     Problem: Inability to Wean (Mechanical Ventilation, Invasive)  Goal: Mechanical Ventilation Liberation  Outcome: Ongoing, Progressing  Intervention: Promote Extubation and Mechanical Ventilation Liberation     Problem: Nutrition Impairment (Mechanical Ventilation, Invasive)  Goal: Optimal Nutrition Delivery  Outcome: Ongoing, Progressing  Intervention: Optimize Nutrition Delivery     Problem: Skin and Tissue Injury (Mechanical Ventilation, Invasive)  Goal: Absence of Device-Related Skin and Tissue Injury  Outcome: Ongoing,  Progressing  Intervention: Maintain Skin and Tissue Health     Problem: Ventilator-Induced Lung Injury (Mechanical Ventilation, Invasive)  Goal: Absence of Ventilator-Induced Lung Injury  Outcome: Ongoing, Progressing  Intervention: Prevent Ventilator-Associated Pneumonia  Intervention: Facilitate Lung-Protection Measures     Problem: Adjustment to Illness (Sepsis/Septic Shock)  Goal: Optimal Coping  Outcome: Ongoing, Progressing  Intervention: Optimize Psychosocial Adjustment to Illness     Problem: Bleeding (Sepsis/Septic Shock)  Goal: Absence of Bleeding  Outcome: Ongoing, Progressing  Intervention: Monitor and Manage Bleeding     Problem: Glycemic Control Impaired (Sepsis/Septic Shock)  Goal: Blood Glucose Level Within Desired Range  Outcome: Ongoing, Progressing  Intervention: Optimize Glycemic Control     Problem: Infection Progression (Sepsis/Septic Shock)  Goal: Absence of Infection Signs and Symptoms  Outcome: Ongoing, Progressing  Intervention: Initiate Sepsis Management  Intervention: Promote Recovery  Intervention: Promote Stabilization     Problem: Nutrition Impaired (Sepsis/Septic Shock)  Goal: Optimal Nutrition Intake  Outcome: Ongoing, Progressing  Intervention: Promote and Optimize Nutrition Delivery     Problem: Restraint, Nonviolent  Goal: Absence of Harm or Injury  Outcome: Ongoing, Progressing  Intervention: Implement Least Restrictive Safety Strategies  Intervention: Protect Dignity, Rights, and Personal Wellbeing  Intervention: Protect Skin and Joint Integrity     Problem: Diabetes Comorbidity  Goal: Blood Glucose Level Within Targeted Range  Outcome: Ongoing, Progressing  Intervention: Monitor and Manage Glycemia     Problem: Heart Failure Comorbidity  Goal: Maintenance of Heart Failure Symptom Control  Outcome: Ongoing, Progressing  Intervention: Maintain Heart Failure-Management     Problem: Hypertension Comorbidity  Goal: Blood Pressure in Desired Range  Outcome: Ongoing,  Progressing  Intervention: Maintain Blood Pressure Management   Goal Outcome Evaluation:  Plan of Care Reviewed With: patient, daughter, family        Progress: declining

## 2024-03-15 NOTE — PROGRESS NOTES
Multidisciplinary Rounds EN Review Note    Patient Name: Omkar Nava  Date of Encounter: 03/15/24 10:51 EDT  MRN: [id number]  Admission date: 3/10/2024    Reason for visit: EN review . RD to continue to follow per protocol.     EMR reviewed   Medication reviewed D20/LR @ 50 ml/hr, precedex, fentanyl, bowel regimen  Labs reviewed K 3.4, Creatinine 1.93, BUN 37    Estimated/Assessed Needs (3/11):      Energy: 1550 kcal while on vent  Protein: 85 g with current renal fx  Fluids: per clinical status    Additional Information Obtained:   Pt with abd. distention yesterday and EN was held. Imagining showed mild excess fluid in bowel and stomach, stool burden. No BM yet, receiving senokot, colace, and miralax. Gastroenterology placed NJ in EGD yesterday. Discussed in MDR, Intensivist okay with restarting slow/low. Pt with hypoglycemia while EN was on hold and D20 infusion added, discussed plan to stop this as EN advances to goal. Renal fx labs slightly worsened today. O2 sats improved and no current plans for proning pt.     Current diet: NPO Diet NPO Type: Strict NPO    EN: NovaSource Renal  Goal Rate: 40 ml/hr    Water Flushes: 10 ml/hr  Modular: None  Route: NG  Tube: Large bore    At goal over: 20Hrs/day    Rx will supply:   Goal Volume 800 mL/day     Flush Volume 200 mL/day     Energy 1600 Kcal/day 103 % Est Need   Protein 73 g/day *85 % Est Need   Fiber 0 g/day     Water in   mL     Total Water 776 mL     Meet DRI No      *plan to increase protein once renal fx labs stable  --------------------------------------------------------------------------  Product/Rate verified at bedside: No - on hold  Infusing Rate at time of visit:     Average Delivery from [facility] Day:(on hold)  Volume  mL/day   % Goal Vol.   Flush Volume  mL/day     Energy  Kcal/day  % Est Need   Protein  g/day  % Est Need   Fiber  g/day     Water in  EN  mL     Total Water  mL     Meet DRI No          Intervention:  Follow treatment  plan  Care plan reviewed    Re-initiate previous EN regimen:  Initiate continuous EN regimen of Novasource Renal @ 10 ml/hr and advance by 10 ml/hr Q 8 hr as tolerated to goal rate of 40 ml/hr, water flushes 10 ml/hr.     Recommend stop D20 as EN advances to goal.     Continue to closely monitor tolerance / bowel fx- if no BM soon may need enema or other aggressive bowel regimen.     Please consult RD for regimen alterations if proning position needed.     Follow up:   Per protocol      Chuyita Moreno RD, [facility]  10:58 EDT  Time: 15min

## 2024-03-15 NOTE — PROGRESS NOTES
"   LOS: 5 days    Patient Care Team:  Deann Cao MD as PCP - General (Internal Medicine)    Consulted for: Acute on chronic renal failure, hyperkalemia  68-year-old CKD stage III admitted with shortness of breath, choking of food at NH, admitted with JAYY, hyperkalemia, acute respiratory failure now intubated, anemia.  Later developed oliguria with increasing BUN/creatinine.  Patient was treated with Lokelma, bicarb, dextrose, insulin, calcium gluconate.  Subjective     Interval History:   Patient remain sedated on ventilator.  Other physicians note seen.  Mild increase in creatinine is noted.  Remain critically ill in the ICU setting.      Review of Systems:    Review of systems could not be obtained due to  patient intubated. emergent nature of case.    Objective     Vital Sign Min/Max for last 24 hours  Temp  Min: 98.2 °F (36.8 °C)  Max: 99.7 °F (37.6 °C)   BP  Min: 114/61  Max: 170/70   Pulse  Min: 63  Max: 77   Resp  Min: 18  Max: 22   SpO2  Min: 92 %  Max: 99 %   No data recorded   Weight  Min: 92 kg (202 lb 13.2 oz)  Max: 92 kg (202 lb 13.2 oz)     Flowsheet Rows      Flowsheet Row First Filed Value   Admission Height 151.9 cm (59.8\") Documented at 03/10/2024 0910   Admission Weight 84.8 kg (187 lb) Documented at 03/10/2024 0910            I/O this shift:  In: 467.5 [I.V.:467.5]  Out: 275 [Urine:275]  I/O last 3 completed shifts:  In: 4413.4 [I.V.:2624.5; Blood:732.8; Other:110; NG/GT:909; IV Piggyback:37.2]  Out: 2914 [Urine:2914]    Physical Exam:  General Appearance: AA male intubated on ventilator no obvious distress.  Oral intubated  Eyes: PER.  Neck: Restricted due to intubation no JVD  Lungs: Ventilator sound heard, equal chest movement, nonlabored.  Heart: No gallop, murmur, rub, RRR.  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  Area of abrasions and " "healing noted.   Story catheter in place.    WBC WBC   Date Value Ref Range Status   03/15/2024 11.62 (H) 3.40 - 10.80 10*3/mm3 Final   03/14/2024 10.64 3.40 - 10.80 10*3/mm3 Final      HGB Hemoglobin   Date Value Ref Range Status   03/15/2024 8.6 (L) 13.0 - 17.7 g/dL Final   03/14/2024 9.0 (L) 13.0 - 17.7 g/dL Final   03/14/2024 7.7 (L) 13.0 - 17.7 g/dL Final   03/14/2024 6.8 (C) 13.0 - 17.7 g/dL Final      HCT Hematocrit   Date Value Ref Range Status   03/15/2024 26.6 (L) 37.5 - 51.0 % Final   03/14/2024 27.9 (L) 37.5 - 51.0 % Final   03/14/2024 24.7 (L) 37.5 - 51.0 % Final   03/14/2024 22.3 (L) 37.5 - 51.0 % Final      Platlets No results found for: \"LABPLAT\"   MCV MCV   Date Value Ref Range Status   03/15/2024 89.0 79.0 - 97.0 fL Final   03/14/2024 91.0 79.0 - 97.0 fL Final          Sodium Sodium   Date Value Ref Range Status   03/15/2024 144 136 - 145 mmol/L Final   03/14/2024 143 136 - 145 mmol/L Final   03/13/2024 142 136 - 145 mmol/L Final      Potassium Potassium   Date Value Ref Range Status   03/15/2024 3.4 (L) 3.5 - 5.2 mmol/L Final   03/14/2024 3.6 3.5 - 5.2 mmol/L Final   03/13/2024 4.0 3.5 - 5.2 mmol/L Final      Chloride Chloride   Date Value Ref Range Status   03/15/2024 109 (H) 98 - 107 mmol/L Final   03/14/2024 109 (H) 98 - 107 mmol/L Final   03/13/2024 109 (H) 98 - 107 mmol/L Final      CO2 CO2   Date Value Ref Range Status   03/15/2024 26.0 22.0 - 29.0 mmol/L Final   03/14/2024 25.0 22.0 - 29.0 mmol/L Final   03/13/2024 24.0 22.0 - 29.0 mmol/L Final      BUN BUN   Date Value Ref Range Status   03/15/2024 37 (H) 8 - 23 mg/dL Final   03/14/2024 35 (H) 8 - 23 mg/dL Final   03/13/2024 35 (H) 8 - 23 mg/dL Final      Creatinine Creatinine   Date Value Ref Range Status   03/15/2024 1.93 (H) 0.76 - 1.27 mg/dL Final   03/14/2024 1.69 (H) 0.76 - 1.27 mg/dL Final   03/13/2024 1.47 (H) 0.76 - 1.27 mg/dL Final      Calcium Calcium   Date Value Ref Range Status   03/15/2024 8.7 8.6 - 10.5 mg/dL Final " "  03/14/2024 8.7 8.6 - 10.5 mg/dL Final   03/13/2024 8.9 8.6 - 10.5 mg/dL Final      PO4 No results found for: \"CAPO4\"   Albumin Albumin   Date Value Ref Range Status   03/14/2024 3.1 (L) 3.5 - 5.2 g/dL Final   03/14/2024 3.2 (L) 3.5 - 5.2 g/dL Final   03/13/2024 3.3 (L) 3.5 - 5.2 g/dL Final      Magnesium Magnesium   Date Value Ref Range Status   03/15/2024 1.9 1.6 - 2.4 mg/dL Final        Uric Acid No results found for: \"URICACID\"        Results Review:     I reviewed the patient's new clinical results.    albuterol, 2.5 mg, Nebulization, Q6H - RT  amLODIPine, 10 mg, Nasogastric, Q24H  artificial tears, , Both Eyes, Q4H  brimonidine, 1 drop, Both Eyes, TID   And  timolol, 1 drop, Both Eyes, BID  carvedilol, 12.5 mg, Nasogastric, Q12H  cefepime, 2,000 mg, Intravenous, Q12H  chlorhexidine, 15 mL, Mouth/Throat, Q12H  sennosides, 10 mL, Nasogastric, BID   And  docusate sodium, 100 mg, Nasogastric, BID  dornase alpha, 2.5 mg, Inhalation, BID - RT  hydrALAZINE, 100 mg, Nasogastric, Q8H  insulin regular, 2-9 Units, Subcutaneous, Q6H  itraconazole, 200 mg, Nasogastric, Q12H  pantoprazole, 40 mg, Intravenous, Q12H  polyethylene glycol, 17 g, Nasogastric, BID  potassium chloride, 40 mEq, Nasogastric, Once  sodium chloride, 10 mL, Intravenous, Q12H  terazosin, 5 mg, Nasogastric, Q12H  Valproic Acid, 250 mg, Nasogastric, Q8H  vancomycin (dosing per levels), , Does not apply, Daily      dexmedetomidine, 0.2-1.5 mcg/kg/hr (Order-Specific), Last Rate: 1.2 mcg/kg/hr (03/15/24 0838)  dextrose, 50 mL/hr, Last Rate: 50 mL/hr (03/15/24 0556)  fentanyl 10 mcg/mL,  mcg/hr, Last Rate: 175 mcg/hr (03/15/24 0802)  norepinephrine, 0.02-0.3 mcg/kg/min  Pharmacy to dose vancomycin,         Medication Review: Reviewed    Assessment & Plan       Healthcare-associated pneumonia    Essential hypertension    Neurocognitive disorder    AMS (altered mental status)    Dementia    Dysphagia    Iron deficiency anemia    Protein calorie " malnutrition    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  Acute on chronic renal failure CKD stage III.  Acute rise in creatinine with recent event.  Decreased renal perfusion, hypoxia, IV contrast.  2.  Hyperkalemia: Multifactorial due to JAYY, respiratory failure, acidosis.  3.  CKD stage III.  Recent creatinine 1.68.  CT abdomen showed normal-sized kidneys with no stones or hydronephrosis.  4.  Respiratory failure: On ventilator.  CT angio negative for pulmonary embolism  5.  Essential hypertension.  6.  Altered mental status.  7.  Bradycardia.  8.  Dementia.     Recommendations:  Replace potassium.  Renal function slightly deteriorated with diuresis.  Patient has some edema.  Will give Lasix 40 twice daily for now monitor renal function.  Avoid nephrotoxic medication  Keep MAP greater than 65 mmHg,  Adjust medication according to the GFR.  Case discussed with the sister in the room.  High risk complex patient with multiple medical problems.  Case discussed with the staff RN.  Hailey Alexis MD  03/15/24  11:25 EDT

## 2024-03-15 NOTE — CASE MANAGEMENT/SOCIAL WORK
Continued Stay Note   [facility]     Patient Name: Omkar Nava  MRN: [id number]  Today's Date: 3/15/2024    Admit Date: 3/10/2024    Plan: [facility]   Discharge Plan       Row Name 03/15/24 1528       Plan    Plan [facility]    Plan Comments Discussed patient in MDR and spoke with his sister Elida at bedside and with daughter Idalia on speaker phone.  Patient remains on mechanical ventilation; weaning as tolerated.  NJT placed yesterday and nutrition re-started today.  Discharge goal is to return to LTC bed at [facility].  CM will continue to follow.                   Discharge Codes    No documentation.                   Daisy Kelly RN

## 2024-03-15 NOTE — PLAN OF CARE
Goal Outcome Evaluation:     Pt still not following commands, withdraws to pain only. Sporadic movement of BUE. Blood sugar continued to drop, D5 changed to D20 and rate increased. FIO2 decreased to 50%. No BM. VSS.

## 2024-03-15 NOTE — PROGRESS NOTES
"Pharmacy Consult-Vancomycin Dosing  Omkar Nava is a  68 y.o. male receiving vancomycin therapy.     Indication:  Pneumonia  Consulting Provider: Intensivist  ID Consult:  No    Goal AUC: 400 - 600 mg/L*hr    Current Antimicrobial Therapy  Vancomycin (Day 6)  Cefepime (Day 6)  Itraconazole (Day 2)    Allergies  Allergies as of 03/10/2024    (No Known Allergies)     Labs    Results from last 7 days   Lab Units 03/15/24  0327 03/14/24  0358 03/13/24  0351   BUN mg/dL 37* 35* 35*   CREATININE mg/dL 1.93* 1.69* 1.47*     Results from last 7 days   Lab Units 03/15/24  0327 03/14/24  0358 03/12/24  0406   WBC 10*3/mm3 11.62* 10.64 9.22     Evaluation of Dosing     Is Patient on Dialysis or Renal Replacement: No    Ht - 151.9 cm (59.8\")  Wt - 92 kg (202 lb 13.2 oz)    Intake & Output (last 2 days)         03/13 0701  03/14 0700 03/14 0701  03/15 0700 03/15 0701  03/16 0700    I.V. (mL/kg) 1307.8 (14.1) 1799.3 (19.6) 467.5 (5.1)    Blood  732.8     Other 242      NG/ 530     IV Piggyback 348.6 37.2     Total Intake(mL/kg) 2586.4 (27.9) 3099.2 (33.7) 467.5 (5.1)    Urine (mL/kg/hr) 1314 (0.6) 2550 (1.2) 275 (0.7)    Stool       Total Output 1314 2550 275    Net +1272.4 +549.2 +192.5                 Microbiology and Radiology  Microbiology Results (last 10 days)       Procedure Component Value - Date/Time    Respiratory Culture - Aspirate, ET Suction [[id number]] Collected: 03/14/24 1928    Lab Status: Preliminary result Specimen: Aspirate from ET Suction Updated: 03/14/24 2127     Gram Stain Moderate (3+) WBCs per low power field      Moderate (3+) Epithelial cells per low power field      Few (2+) Gram negative bacilli      Few (2+) Gram positive cocci in pairs, chains and clusters      Rare (1+) Yeast    Respiratory Culture - Sputum, NT Suction [[id number]]  (Abnormal) Collected: 03/11/24 0104    Lab Status: Final result Specimen: Sputum from NT Suction Updated: 03/14/24 1247     Respiratory Culture Light growth " (2+) Candida albicans      Rare Normal Respiratory Preethi     Gram Stain Many (4+) WBCs per low power field      Rare (1+) Epithelial cells per low power field      Rare (1+) Yeast    MRSA Screen, PCR (Inpatient) - Swab, Nares [[id number]]  (Abnormal) Collected: 03/10/24 1415    Lab Status: Final result Specimen: Swab from Nares Updated: 03/10/24 1621     MRSA PCR Positive    Narrative:      The negative predictive value of this diagnostic test is high and should only be used to consider de-escalating anti-MRSA therapy. A positive result may indicate colonization with MRSA and must be correlated clinically.    Respiratory Panel PCR w/COVID-19(SARS-CoV-2) GIANNI/SIENNA/ANNA/PAD/COR/ASHIA In-House, NP Swab in UTM/VTM, 2 HR TAT - Swab, Nasopharynx [[id number]]  (Normal) Collected: 03/10/24 1415    Lab Status: Final result Specimen: Swab from Nasopharynx Updated: 03/10/24 1518     ADENOVIRUS, PCR Not Detected     Coronavirus 229E Not Detected     Coronavirus HKU1 Not Detected     Coronavirus NL63 Not Detected     Coronavirus OC43 Not Detected     COVID19 Not Detected     Human Metapneumovirus Not Detected     Human Rhinovirus/Enterovirus Not Detected     Influenza A PCR Not Detected     Influenza B PCR Not Detected     Parainfluenza Virus 1 Not Detected     Parainfluenza Virus 2 Not Detected     Parainfluenza Virus 3 Not Detected     Parainfluenza Virus 4 Not Detected     RSV, PCR Not Detected     Bordetella pertussis pcr Not Detected     Bordetella parapertussis PCR Not Detected     Chlamydophila pneumoniae PCR Not Detected     Mycoplasma pneumo by PCR Not Detected    Narrative:      In the setting of a positive respiratory panel with a viral infection PLUS a negative procalcitonin without other underlying concern for bacterial infection, consider observing off antibiotics or discontinuation of antibiotics and continue supportive care. If the respiratory panel is positive for atypical bacterial infection (Bordetella pertussis,  Chlamydophila pneumoniae, or Mycoplasma pneumoniae), consider antibiotic de-escalation to target atypical bacterial infection.    Blood Culture - Blood, Hand, Right [[id number]]  (Normal) Collected: 03/10/24 0944    Lab Status: Final result Specimen: Blood from Hand, Right Updated: 03/15/24 1000     Blood Culture No growth at 5 days    Blood Culture - Blood, Arm, Right [[id number]]  (Abnormal) Collected: 03/10/24 0924    Lab Status: Preliminary result Specimen: Blood from Arm, Right Updated: 03/15/24 0032     Blood Culture Abnormal Stain     Gram Stain Anaerobic Bottle Gram positive bacilli    Narrative:      Blood culture does not meet the specified criteria for PCR identification.  If pregnant, immunocompromised, or clinical concern for meningitis, call lab to run BCID for Listeria monocytogenes.    COVID-19 and FLU A/B PCR, 1 HR TAT - Swab, Nasopharynx [[id number]]  (Normal) Collected: 03/10/24 0923    Lab Status: Final result Specimen: Swab from Nasopharynx Updated: 03/10/24 1017     COVID19 Not Detected     Influenza A PCR Not Detected     Influenza B PCR Not Detected    Narrative:      Fact sheet for providers: https://www.fda.gov/media/435954/download    Fact sheet for patients: https://www.fda.gov/media/072333/download    Test performed by PCR.          Reported Vancomycin Levels    Results from last 7 days   Lab Units 03/15/24  0327 03/13/24  0351 03/12/24  0406 03/11/24  0424   VANCOMYCIN RM mcg/mL 24.60 16.30 14.20 14.90                 Assessment/Plan:  Significant accumulation in vancomycin level with scheduled dosing.  Worsening in condition along with contrast received for CT scan may have resulted in JAYY.  Will hold vancomycin for now to monitor renal function and to gain more data on how patient is clearing drug.  Level check 3/16 with AM labs.  Pharmacy will continue to follow and adjust dose based on renal function, drug levels, and clinical status.    Thanks,  Sarkis Shelton, PharmD, BCPS,  [facility]  03/15/24  11:10 EDT

## 2024-03-15 NOTE — PLAN OF CARE
Goal Outcome Evaluation: Not following commands when awake. Spastic jerking to LUE. Tolerating enteral nutrition. Potassium level resolved on re-check. FiO2 40%/PEEP12. Hemodynamically stable.     Problem: Skin Injury Risk Increased  Goal: Skin Health and Integrity  Intervention: Optimize Skin Protection  Recent Flowsheet Documentation  Taken 3/15/2024 1800 by Ed Feldman RN  Head of Bed ([facility]) Positioning: HOB at 30-45 degrees  Taken 3/15/2024 1600 by Ed Feldman RN  Pressure Reduction Techniques:   weight shift assistance provided   heels elevated off bed  Head of Bed ([facility]) Positioning: HOB at 30-45 degrees  Pressure Reduction Devices:   specialty bed utilized   heel offloading device utilized  Skin Protection:   tubing/devices free from skin contact   incontinence pads utilized  Taken 3/15/2024 1400 by Ed Feldman RN  Head of Bed ([facility]) Positioning: HOB at 30-45 degrees  Taken 3/15/2024 1200 by Ed Feldman RN  Pressure Reduction Techniques:   weight shift assistance provided   heels elevated off bed  Head of Bed ([facility]) Positioning: HOB at 30-45 degrees  Pressure Reduction Devices:   specialty bed utilized   feet on footrest/footstool  Skin Protection:   tubing/devices free from skin contact   skin-to-skin areas padded   skin-to-device areas padded   incontinence pads utilized  Taken 3/15/2024 1000 by Ed Feldman RN  Head of Bed ([facility]) Positioning: HOB at 30-45 degrees  Taken 3/15/2024 0800 by Ed Feldman RN  Pressure Reduction Techniques:   weight shift assistance provided   heels elevated off bed  Head of Bed ([facility]) Positioning: HOB at 30-45 degrees  Pressure Reduction Devices: specialty bed utilized  Skin Protection:   tubing/devices free from skin contact   skin-to-skin areas padded   skin-to-device areas padded   incontinence pads utilized     Problem: Fall Injury Risk  Goal: Absence of Fall and Fall-Related Injury  Intervention: Identify and Manage Contributors  Recent Flowsheet  Documentation  Taken 3/15/2024 1800 by Ed Feldman RN  Medication Review/Management: medications reviewed  Taken 3/15/2024 1600 by Ed Feldman RN  Medication Review/Management: medications reviewed  Taken 3/15/2024 1400 by Ed Feldman RN  Medication Review/Management: medications reviewed  Taken 3/15/2024 1200 by Ed Feldman RN  Medication Review/Management: medications reviewed  Taken 3/15/2024 1000 by Ed Feldman RN  Medication Review/Management: medications reviewed  Taken 3/15/2024 0800 by Ed Feldman RN  Medication Review/Management: medications reviewed     Problem: Fall Injury Risk  Goal: Absence of Fall and Fall-Related Injury  Intervention: Promote Injury-Free Environment  Recent Flowsheet Documentation  Taken 3/15/2024 1800 by Ed Feldman RN  Safety Promotion/Fall Prevention:   safety round/check completed   activity supervised  Taken 3/15/2024 1600 by Ed Feldman RN  Safety Promotion/Fall Prevention:   safety round/check completed   activity supervised  Taken 3/15/2024 1400 by Ed Feldman RN  Safety Promotion/Fall Prevention:   safety round/check completed   activity supervised  Taken 3/15/2024 1200 by Ed Feldman RN  Safety Promotion/Fall Prevention:   safety round/check completed   activity supervised  Taken 3/15/2024 1000 by Ed Feldman RN  Safety Promotion/Fall Prevention:   safety round/check completed   activity supervised  Taken 3/15/2024 0800 by Ed Feldman RN  Safety Promotion/Fall Prevention:   safety round/check completed   activity supervised     Problem: Adult Inpatient Plan of Care  Goal: Absence of Hospital-Acquired Illness or Injury  Intervention: Prevent Skin Injury  Recent Flowsheet Documentation  Taken 3/15/2024 1800 by Ed Feldman RN  Body Position:   weight shifting   neutral body alignment  Taken 3/15/2024 1600 by Ed Feldman RN  Body Position:   weight shifting   right  Skin Protection:   tubing/devices free from  skin contact   incontinence pads utilized  Taken 3/15/2024 1400 by Ed Feldman RN  Body Position:   weight shifting   left  Taken 3/15/2024 1200 by Ed Feldman RN  Body Position:   weight shifting   neutral body alignment  Skin Protection:   tubing/devices free from skin contact   skin-to-skin areas padded   skin-to-device areas padded   incontinence pads utilized  Taken 3/15/2024 1000 by Ed Feldman RN  Body Position:   weight shifting   right  Taken 3/15/2024 0800 by Ed Feldman RN  Body Position:   weight shifting   left  Skin Protection:   tubing/devices free from skin contact   skin-to-skin areas padded   skin-to-device areas padded   incontinence pads utilized     Problem: Device-Related Complication Risk (Mechanical Ventilation, Invasive)  Goal: Optimal Device Function  Intervention: Optimize Device Care and Function  Recent Flowsheet Documentation  Taken 3/15/2024 1800 by Ed Feldman RN  Aspiration Precautions: tube feeding placement verified  Airway Safety Measures:   suction at bedside   oxygen flowmeter at bedside   mask valve resuscitator at bedside  Taken 3/15/2024 1600 by Ed Feldman RN  Aspiration Precautions: tube feeding placement verified  Airway Safety Measures:   suction at bedside   oxygen flowmeter at bedside   mask valve resuscitator at bedside  Taken 3/15/2024 1400 by Ed Feldman RN  Aspiration Precautions: tube feeding placement verified  Airway Safety Measures:   suction at bedside   oxygen flowmeter at bedside   mask valve resuscitator at bedside  Taken 3/15/2024 1200 by Ed Feldman RN  Aspiration Precautions: tube feeding placement verified  Airway Safety Measures:   suction at bedside   oxygen flowmeter at bedside   mask valve resuscitator at bedside  Taken 3/15/2024 1000 by Ed Feldman RN  Airway Safety Measures: suction at bedside  Taken 3/15/2024 0800 by Ed Feldman RN  Aspiration Precautions: tube feeding placement verified  Airway  Safety Measures:   suction at bedside   oxygen flowmeter at bedside   mask valve resuscitator at bedside     Problem: Ventilator-Induced Lung Injury (Mechanical Ventilation, Invasive)  Goal: Absence of Ventilator-Induced Lung Injury  Intervention: Prevent Ventilator-Associated Pneumonia  Recent Flowsheet Documentation  Taken 3/15/2024 1800 by Ed Feldman RN  Head of Bed ([facility]) Positioning: HOB at 30-45 degrees  Taken 3/15/2024 1600 by Ed Feldman RN  Head of Bed ([facility]) Positioning: HOB at 30-45 degrees  VAP Prevention Bundle:   HOB elevation maintained   oral care regularly provided   stress ulcer prophylaxis provided   VTE prophylaxis provided   readiness to extubate assessed  VAP Prevention Measures: completed  Oral Care:   lip/mouth moisturizer applied   suction provided   swabbed with antiseptic solution  Taken 3/15/2024 1400 by Ed Feldman RN  Head of Bed ([facility]) Positioning: HOB at 30-45 degrees  Taken 3/15/2024 1200 by Ed Feldman RN  Head of Bed ([facility]) Positioning: HOB at 30-45 degrees  VAP Prevention Bundle:   HOB elevation maintained   oral care regularly provided   sedation interruption performed   stress ulcer prophylaxis provided   VTE prophylaxis provided  VAP Prevention Measures: completed  Oral Care:   lip/mouth moisturizer applied   suction provided   swabbed with antiseptic solution  Taken 3/15/2024 1000 by Ed Feldman RN  Head of Bed ([facility]) Positioning: HOB at 30-45 degrees  Taken 3/15/2024 0800 by Ed Feldman RN  Head of Bed ([facility]) Positioning: HOB at 30-45 degrees  VAP Prevention Bundle:   HOB elevation maintained   oral care regularly provided   readiness to extubate assessed   sedation interruption performed   stress ulcer prophylaxis provided   VTE prophylaxis provided  VAP Prevention Measures: completed  Oral Care:   lip/mouth moisturizer applied   suction provided   swabbed with antiseptic solution

## 2024-03-15 NOTE — NURSING NOTE
WOC consulted for skin assessment during prone therapy.    Patient did not require prone therapy as anticipated.    Remains at high risk for pressure injury development.  Please offload and apply Allevyn foam dressings to sacrococcygeal area and bilateral heels.  Also consider utilization of bilateral offloading heel boots.  Keep patient turned.    WOC sign off.    Sathya Garcia RN, BSN, CCRN, CWOCN  Wound, Ostomy and Continence (WOC) Department  [facility]

## 2024-03-15 NOTE — PROGRESS NOTES
Critical Care Note     LOS: 5 days   Patient Care Team:  Deann Cao MD as PCP - General (Internal Medicine)    Chief Complaint/Reason for visit:    Chief Complaint   Patient presents with    Aspiration     Acute respiratory failure  Aspiration pneumonia  Chronic congestive heart failure with reduced EF  Chronic kidney disease  Diabetes mellitus type 2  Dementia with delirium  Hypertension  Chronic anemia    Subjective     Interval History:     PEEP increased to 12 yesterday and able to wean FiO2 to 40%.  Decision was made not to prone him with improved FiO2.  Postpyloric tube successfully placed by gastroenterology.  Temperature 99.7.  Currently increased to 1.93.  Urine output good at 2.5 L.  2 units of packed red cells yesterday and hemoglobin improved to 8.6    Review of Systems:    All systems were reviewed and negative except as noted in subjective.    Medical history, surgical history, social history, family history reviewed    Objective     Intake/Output:    Intake/Output Summary (Last 24 hours) at 3/15/2024 1340  Last data filed at 3/15/2024 1200  Gross per 24 hour   Intake 3000.39 ml   Output 2570 ml   Net 430.39 ml       Nutrition:  NPO Diet NPO Type: Strict NPO    Infusions:  dexmedetomidine, 0.2-1.5 mcg/kg/hr (Order-Specific), Last Rate: 1.4 mcg/kg/hr (03/15/24 1321)  dextrose, 50 mL/hr, Last Rate: 50 mL/hr (03/15/24 0556)  fentanyl 10 mcg/mL,  mcg/hr, Last Rate: 200 mcg/hr (03/15/24 1326)  norepinephrine, 0.02-0.3 mcg/kg/min  Pharmacy to dose vancomycin,         Mechanical Ventilator Settings:            Resp Rate (Set): 18     FiO2 (%): 40 %  PEEP/CPAP (cm H2O): 12 cm H20    Minute Ventilation (L/min) (Obs): 6.83 L/min  Resp Rate (Observed) Vent: 18  I:E Ratio (Set): 1:2.33  I:E Ratio (Obs): 1:2.3    PIP Observed (cm H2O): 31 cm H2O  Plateau Pressure (cm H2O): 24 cm H2O    Telemetry: Sinus rhythm             Vital Signs  Blood pressure 145/63, pulse 69, temperature 99.7 °F (37.6 °C),  "temperature source Bladder, resp. rate 18, height 151.9 cm (59.8\"), weight 92 kg (202 lb 13.2 oz), SpO2 96%.    Physical Exam:  General Appearance:  Old appearing gentleman sedated, supine   Head:  No visible trauma   Eyes:          Conjunctiva pale, both pupils are distorted   Ears:     Throat: Orally intubated, nasoduodenal tube   Neck: Trachea midline, left IJ deep line   Back:      Lungs:   Respirations are bilateral, scattered expiratory rhonchi,     Heart:  Regular rhythm, S1, S2 auscultated   Abdomen:   Distended, bowel sounds present , abdomen is firm, unchanged from yesterday   Rectal:   Deferred   Extremities: Left forefoot amputation healed.   Trace upper extremity edema   Pulses: Palpable radial pulses    Skin: Multiple wounds currently dressed   Lymph nodes: No cervical adenopathy   Neurologic: Sedated      Results Review:     I reviewed the patient's new clinical results.   Results from last 7 days   Lab Units 03/15/24  0327 03/14/24  0358 03/13/24  0351 03/11/24  1228 03/11/24  0424 03/10/24  1241 03/10/24  0944   SODIUM mmol/L 144 143 142   < > 143   < > 139   POTASSIUM mmol/L 3.4* 3.6 4.0   < > 5.7*   < > 7.4*   CHLORIDE mmol/L 109* 109* 109*   < > 111*   < > 111*   CO2 mmol/L 26.0 25.0 24.0   < > 23.0   < > 22.0   BUN mg/dL 37* 35* 35*   < > 64*   < > 63*   CREATININE mg/dL 1.93* 1.69* 1.47*   < > 2.29*   < > 1.78*   CALCIUM mg/dL 8.7 8.7 8.9   < > 8.8   < > 8.8   BILIRUBIN mg/dL  --  0.2  --   --  0.2  --  <0.2   ALK PHOS U/L  --  83  --   --  74  --  89   ALT (SGPT) U/L  --  33  --   --  46*  --  46*   AST (SGOT) U/L  --  30  --   --  28  --  32   GLUCOSE mg/dL 68 63* 213*   < > 103*   < > 147*    < > = values in this interval not displayed.     Results from last 7 days   Lab Units 03/15/24  0327 03/14/24  1948 03/14/24  1209 03/14/24  0358 03/12/24  0406 03/11/24  1228 03/11/24  0424   WBC 10*3/mm3 11.62*  --   --  10.64 9.22  --  6.19   HEMOGLOBIN g/dL 8.6* 9.0* 7.7* 6.8* 7.9*   < > 7.2* "   HEMATOCRIT % 26.6* 27.9* 24.7* 22.3* 25.9*   < > 23.5*   PLATELETS 10*3/mm3 110*  --   --  126* 154  --  146   MONOCYTES % %  --   --   --  6.0  --   --  1.0*   EOSINOPHIL % %  --   --   --  1.0  --   --  0.0*    < > = values in this interval not displayed.     Results from last 7 days   Lab Units 03/15/24  0325   PH, ARTERIAL pH units 7.410   PO2 ART mm Hg 87.1   PCO2, ARTERIAL mm Hg 44.1   HCO3 ART mmol/L 27.9*     Lab Results   Component Value Date    BLOODCX No growth at 5 days 03/10/2024     Lab Results   Component Value Date    URINECX 30,000-40,000 CFU/mL Normal Urogenital Preethi 05/06/2017       I reviewed the patient's new imaging including images and reports.    XR CHEST 1 VW    Date of Exam: 3/15/2024 1:05 AM EDT    Indication: to confirm placement of ET Tube -Manual Prone Protocol    Comparison: CT 1 day prior.    Findings:  Support hardware projects unchanged. There is persistent diffuse dense airspace disease most confluent in the lower lobes where there are also small layering adjacent pleural effusions. There is no distinct pneumothorax. Unchanged heart and mediastinal  contours.   Impression:     Impression:  Support hardware projects unchanged. There is persistent diffuse dense airspace disease most confluent in the lower lobes where there are also small layering adjacent pleural effusions. There is no distinct pneumothorax. Unchanged heart and mediastinal  contours.          Electronically Signed: Boom Rodriguez MD   3/15/2024 5:42 AM EDT       CT ABDOMEN PELVIS W CONTRAST, CT ANGIOGRAM CHEST    Date of Exam: 3/14/2024 9:24 AM EDT    Indication: bowel obstruction.    Comparison: Chest and abdomen radiograph from earlier today, CT abdomen/pelvis from March 11, 2024, and CT chest from March 10, 2024    Technique: Axial CT images were obtained of the chest abdomen and pelvis following the uneventful intravenous administration of 85 mL Isovue-370. Reconstructed coronal and sagittal images were also  obtained. Automated exposure control and iterative  construction methods were used.      Findings:  Chest:    An endotracheal tube is in place. There are diffuse bilateral consolidations that have worsened from prior CT. There are small bilateral pleural effusions.    The heart is enlarged. The great vessels are normal in caliber. A left internal jugular catheter has its tip at the mid SVC. There is no evidence of pulmonary embolus. There is mediastinal lymphadenopathy, likely reactive.    No aggressive osseous lesions are identified.    Abdomen/pelvis:    The liver, gallbladder, adrenal glands, kidneys, spleen, and pancreas are unremarkable.    An NG tube has its tip in the stomach. The stomach is mildly distended. The small bowel appears normal in caliber and configuration. The colon appears normal. The appendix appears normal. There is no ascites or loculated collection. No abnormally  enlarged lymph nodes are identified.    The rectum and prostate are unremarkable. A urinary Story catheter is in place. There is some wall thickening of the urinary bladder.    No aggressive osseous lesions are identified.   Impression:     Impression:  1.Negative for pulmonary embolus.  2.Diffuse bilateral consolidations have worsened from prior CT from March 10, 2024, suggesting pneumonia, pulmonary edema, and/or ARDS.  3.Small bilateral pleural effusions.  4.Cardiomegaly.  5.Mediastinal lymphadenopathy, likely reactive.  6.No evidence of bowel obstruction.  7.Some wall thickening of urinary bladder, which could represent cystitis.        Electronically Signed: Omkar Umana MD   3/14/2024 10:21 AM EDT         Interpretation Summary echocardiogram January 15, 2024         Left ventricular ejection fraction appears to be 56 - 60%.    Left ventricular wall thickness is consistent with hypertrophy.    Estimated right ventricular systolic pressure from tricuspid regurgitation is mildly elevated (35-45 mmHg).    There is a small  (1-2cm) pericardial effusion.    No evidence for pericardial tamponade    All medications reviewed.   albuterol, 2.5 mg, Nebulization, Q6H - RT  amLODIPine, 10 mg, Nasogastric, Q24H  artificial tears, , Both Eyes, Q4H  brimonidine, 1 drop, Both Eyes, TID   And  timolol, 1 drop, Both Eyes, BID  carvedilol, 12.5 mg, Nasogastric, Q12H  cefepime, 2,000 mg, Intravenous, Q12H  chlorhexidine, 15 mL, Mouth/Throat, Q12H  sennosides, 10 mL, Nasogastric, BID   And  docusate sodium, 100 mg, Nasogastric, BID  dornase alpha, 2.5 mg, Inhalation, BID - RT  furosemide, 40 mg, Intravenous, Q12H  hydrALAZINE, 100 mg, Nasogastric, Q8H  insulin regular, 2-9 Units, Subcutaneous, Q6H  itraconazole, 200 mg, Nasogastric, Q12H  pantoprazole, 40 mg, Intravenous, Q12H  polyethylene glycol, 17 g, Nasogastric, BID  sodium chloride, 10 mL, Intravenous, Q12H  terazosin, 5 mg, Nasogastric, Q12H  Valproic Acid, 250 mg, Nasogastric, Q8H  vancomycin (dosing per levels), , Does not apply, Daily          Assessment & Plan       Healthcare-associated pneumonia    Essential hypertension    Neurocognitive disorder    AMS (altered mental status)    Dementia    Dysphagia    Iron deficiency anemia    Protein calorie malnutrition    Type 2 diabetes mellitus with diabetic chronic kidney disease    Acute respiratory failure with hypoxia    Acute kidney injury superimposed on chronic kidney disease    Hyperkalemia    Bradycardia    Hypothermia    Acute respiratory failure with hypoxemia    Debilitated 68-year-old gentleman with diabetes mellitus complicated by blindness, osteomyelitis, chronic kidney disease, underlying dementia with psychosis, dependent for mobility, bathing and dressing, chronic anemia, heart failure with normal EF who is now admitted for his third admission in the last 2.5 months for an aspiration event with aspiration pneumonia.  On his previous admission a modified barium swallow did reveal moderate oral and mild pharyngeal dysphagia  February 5, 2024.  Respiratory status deteriorated overnight on March 10 and he required intubation.  He initially was requiring 35% oxygen over March 13, 14th oxygenation worsened and he required 80% FiO2.  CTA of the chest was repeated and was negative for PE but revealed worsening bilateral pneumonia.  He developed abdominal distention.  CTA of the abdomen revealed no bowel obstruction or ischemic bowel.      #1 acute respiratory failure, aspiration pneumonia he is currently on a rate of 18 tidal volume 550 PEEP of 12 and 40% FiO2.  ABG this am, pH of 7.41, CO2 44, O2 87.  Decision was made not to prone because of improved oxygenation.  Peak inspiratory pressure is 30 but plateau pressures are better and driving pressure is only 12.  Clinically I suspect aspiration pneumonitis with a chemical pneumonitis in adult respiratory distress syndrome.  On admission his Gram stain had no bacteria and only rare yeast.  Because of his clinical deterioration despite broad-spectrum antibiotics, empiric antifungal added.  Repeat culture now shows gram-negative bacilli growing.  This is not unexpected in somebody with endotracheal tube for 5 days.    -Continue mechanical ventilatory support  -Rotate patient from side-to-side  -Prone position if clinical decline  -3/14 Sporanox as his respiratory cultures do have yeast  -Blasto and histoplasma antigens.  If they are negative I would stop itraconazole  -Continue vancomycin, cefepime  -Continue bronchodilators  -Discussed permanent feeding tube placement with family given his recurrent aspiration pneumonias    #2 diabetes mellitus with chronic kidney disease, blindness, diabetic foot ulcers and osteomyelitis requiring toe amputation.  Blood sugars dropped with holding of tube feeding and he was placed on D20W.   Serum creatinine did increase to 1.93 after receiving contrast.  He is nonoliguric.  Baseline creatinine is around 1.63.  Renal ultrasound revealed right kidney 12.1 cm,  left kidney 11.3 cm without hydronephrosis.      -Nephrology following  -Continue to monitor creatinine, potassium  -Continue sliding scale insulin  -Resume tube feeding  -Increase insulin when tolerating tube feeding and blood sugars increasing    #3 dementia with psychosis, nursing home resident for 2 years.  Currently he is in a wheelchair needs assist to transfer, needs assist for bathing and dressing.  Home medications include Seroquel, Depakote, Abilify, Prozac, trazodone.  He did fall in 2023 and sustained a neck injury without fracture.  He had swelling in the prevertebral space C2-C5.  He was placed in a cervical collar for ligamentous injuries.  Patient had been removing the collar on his own and cervical collar was removed.  Decision was made to leave the collar off as he had no acute fracture or dislocation.     -Continue Depakote  -Will continue to hold his other psychiatric medications    #4 hypertension, congestive heart failure currently blood pressure is running 114-170.  He does take amlodipine, carvedilol, clonidine, hydralazine, torsemide as an outpatient.  Echocardiogram in January 2024 revealed an EF of 56 to 60% with some LVH and a small 1 to 2 cm pericardial effusion without evidence of tamponade.  He had some mild MR.     - hydralazine to 100 mg every 8 hours  - amlodipine 10 mg  - carvedilol 12.5 mg twice daily  -Hold clonidine,  while he is on Precedex  -Avoid hypotension    #5 anemia  He has a chronic anemia, with hemoglobin running 8-8.4.  Decrease hemoglobin to 6.8 3/13, transfusion 2 unit PRBC, hemoglobin today 8.6  No evidence of acute blood loss (melena, hematemesis)    #6 abdominal distention  KUB suspicious for bowel obstruction  CTA abdomen and pelvis, no bowel obstruction or ischemia  With his difficult nasogastric placement, need for potential proning,  GI did place nasal duodenal tube endoscopically.   Will initiate tube feeding        VTE Prophylaxis: subcutaneous  heparin    Stress Ulcer Prophylaxis: Protonix    I did call and update her daughter Idalia Mtz MARIA DOLORES Cervantes MD  03/15/24  13:40 EDT      Time: Critical care 35 min  I personally provided care to this critically ill patient as documented above.  Critical care time does not include time spent on separately billed procedures.  None of my critical care time was concurrent with other critical care providers.

## 2024-03-16 ENCOUNTER — APPOINTMENT (OUTPATIENT)
Dept: GENERAL RADIOLOGY | Facility: HOSPITAL | Age: 69
End: 2024-03-16
Payer: MEDICARE

## 2024-03-16 PROBLEM — T68.XXXA HYPOTHERMIA: Status: RESOLVED | Noted: 2024-03-10 | Resolved: 2024-03-16

## 2024-03-16 PROBLEM — R00.1 BRADYCARDIA: Status: RESOLVED | Noted: 2024-03-10 | Resolved: 2024-03-16

## 2024-03-16 PROBLEM — E87.5 HYPERKALEMIA: Status: RESOLVED | Noted: 2024-03-10 | Resolved: 2024-03-16

## 2024-03-16 PROBLEM — J96.01 ACUTE RESPIRATORY FAILURE WITH HYPOXIA: Status: RESOLVED | Noted: 2024-02-05 | Resolved: 2024-03-16

## 2024-03-16 LAB
ANION GAP SERPL CALCULATED.3IONS-SCNC: 15 MMOL/L (ref 5–15)
ARTERIAL PATENCY WRIST A: POSITIVE
ATMOSPHERIC PRESS: ABNORMAL MM[HG]
BACTERIA SPEC RESP CULT: ABNORMAL
BACTERIA SPEC RESP CULT: ABNORMAL
BASE EXCESS BLDA CALC-SCNC: 2.6 MMOL/L (ref 0–2)
BDY SITE: ABNORMAL
BODY TEMPERATURE: 37
BUN SERPL-MCNC: 39 MG/DL (ref 8–23)
BUN/CREAT SERPL: 18.9 (ref 7–25)
CALCIUM SPEC-SCNC: 8.7 MG/DL (ref 8.6–10.5)
CHLORIDE SERPL-SCNC: 112 MMOL/L (ref 98–107)
CO2 BLDA-SCNC: 29.5 MMOL/L (ref 22–33)
CO2 SERPL-SCNC: 23 MMOL/L (ref 22–29)
COHGB MFR BLD: 1.2 % (ref 0–2)
CREAT SERPL-MCNC: 2.06 MG/DL (ref 0.76–1.27)
EGFRCR SERPLBLD CKD-EPI 2021: 34.9 ML/MIN/1.73
EPAP: 0
GLUCOSE BLDC GLUCOMTR-MCNC: 156 MG/DL (ref 70–130)
GLUCOSE BLDC GLUCOMTR-MCNC: 169 MG/DL (ref 70–130)
GLUCOSE BLDC GLUCOMTR-MCNC: 173 MG/DL (ref 70–130)
GLUCOSE BLDC GLUCOMTR-MCNC: 214 MG/DL (ref 70–130)
GLUCOSE SERPL-MCNC: 130 MG/DL (ref 65–99)
GRAM STN SPEC: ABNORMAL
HCO3 BLDA-SCNC: 28 MMOL/L (ref 20–26)
HCT VFR BLD CALC: 36.7 % (ref 38–51)
HGB BLDA-MCNC: 12 G/DL (ref 13.5–17.5)
INHALED O2 CONCENTRATION: 40 %
IPAP: 0
METHGB BLD QL: 0.3 % (ref 0–1.5)
MODALITY: ABNORMAL
OXYHGB MFR BLDV: 94.8 % (ref 94–99)
PAW @ PEAK INSP FLOW SETTING VENT: 0 CMH2O
PCO2 BLDA: 45.7 MM HG (ref 35–45)
PCO2 TEMP ADJ BLD: 45.7 MM HG (ref 35–48)
PEEP RESPIRATORY: 12 CM[H2O]
PH BLDA: 7.4 PH UNITS (ref 7.35–7.45)
PH, TEMP CORRECTED: 7.4 PH UNITS
PO2 BLDA: 82.8 MM HG (ref 83–108)
PO2 TEMP ADJ BLD: 82.8 MM HG (ref 83–108)
POTASSIUM SERPL-SCNC: 4.1 MMOL/L (ref 3.5–5.2)
SODIUM SERPL-SCNC: 150 MMOL/L (ref 136–145)
TOTAL RATE: 18 BREATHS/MINUTE
VANCOMYCIN SERPL-MCNC: 13.9 MCG/ML (ref 5–40)
VENTILATOR MODE: ABNORMAL
VT ON VENT VENT: 0.42 ML

## 2024-03-16 PROCEDURE — 80048 BASIC METABOLIC PNL TOTAL CA: CPT

## 2024-03-16 PROCEDURE — 94003 VENT MGMT INPAT SUBQ DAY: CPT

## 2024-03-16 PROCEDURE — 94799 UNLISTED PULMONARY SVC/PX: CPT

## 2024-03-16 PROCEDURE — 25010000002 FENTANYL CITRATE (PF) 2500 MCG/50ML SOLUTION: Performed by: INTERNAL MEDICINE

## 2024-03-16 PROCEDURE — 82375 ASSAY CARBOXYHB QUANT: CPT

## 2024-03-16 PROCEDURE — 82805 BLOOD GASES W/O2 SATURATION: CPT

## 2024-03-16 PROCEDURE — 99291 CRITICAL CARE FIRST HOUR: CPT | Performed by: INTERNAL MEDICINE

## 2024-03-16 PROCEDURE — 63710000001 INSULIN REGULAR HUMAN PER 5 UNITS: Performed by: INTERNAL MEDICINE

## 2024-03-16 PROCEDURE — 25010000002 FUROSEMIDE PER 20 MG: Performed by: INTERNAL MEDICINE

## 2024-03-16 PROCEDURE — 25010000002 CEFEPIME PER 500 MG: Performed by: INTERNAL MEDICINE

## 2024-03-16 PROCEDURE — 80202 ASSAY OF VANCOMYCIN: CPT

## 2024-03-16 PROCEDURE — 82948 REAGENT STRIP/BLOOD GLUCOSE: CPT

## 2024-03-16 PROCEDURE — 71045 X-RAY EXAM CHEST 1 VIEW: CPT

## 2024-03-16 PROCEDURE — 83050 HGB METHEMOGLOBIN QUAN: CPT

## 2024-03-16 PROCEDURE — 25010000002 ERTAPENEM PER 500 MG

## 2024-03-16 PROCEDURE — 36600 WITHDRAWAL OF ARTERIAL BLOOD: CPT

## 2024-03-16 PROCEDURE — 25810000003 SODIUM CHLORIDE 0.9 % SOLUTION: Performed by: INTERNAL MEDICINE

## 2024-03-16 RX ADMIN — FENTANYL CITRATE 250 MCG/HR: 50 INJECTION, SOLUTION INTRAMUSCULAR; INTRAVENOUS at 22:32

## 2024-03-16 RX ADMIN — DEXMEDETOMIDINE HYDROCHLORIDE 1.4 MCG/KG/HR: 400 INJECTION, SOLUTION INTRAVENOUS at 13:04

## 2024-03-16 RX ADMIN — WHITE PETROLATUM 57.7 %-MINERAL OIL 31.9 % EYE OINTMENT: at 09:51

## 2024-03-16 RX ADMIN — VALPROIC ACID 250 MG: 250 SOLUTION ORAL at 05:50

## 2024-03-16 RX ADMIN — HYDRALAZINE HYDROCHLORIDE 100 MG: 50 TABLET ORAL at 05:50

## 2024-03-16 RX ADMIN — FUROSEMIDE 40 MG: 10 INJECTION, SOLUTION INTRAMUSCULAR; INTRAVENOUS at 02:02

## 2024-03-16 RX ADMIN — ALBUTEROL SULFATE 2.5 MG: 2.5 SOLUTION RESPIRATORY (INHALATION) at 07:13

## 2024-03-16 RX ADMIN — WHITE PETROLATUM 57.7 %-MINERAL OIL 31.9 % EYE OINTMENT: at 21:20

## 2024-03-16 RX ADMIN — CARVEDILOL 12.5 MG: 12.5 TABLET, FILM COATED ORAL at 08:07

## 2024-03-16 RX ADMIN — DEXMEDETOMIDINE HYDROCHLORIDE 1.4 MCG/KG/HR: 400 INJECTION, SOLUTION INTRAVENOUS at 09:50

## 2024-03-16 RX ADMIN — HYDRALAZINE HYDROCHLORIDE 100 MG: 50 TABLET ORAL at 21:21

## 2024-03-16 RX ADMIN — 0.12% CHLORHEXIDINE GLUCONATE 15 ML: 1.2 RINSE ORAL at 08:08

## 2024-03-16 RX ADMIN — FENTANYL CITRATE 250 MCG/HR: 50 INJECTION, SOLUTION INTRAMUSCULAR; INTRAVENOUS at 13:04

## 2024-03-16 RX ADMIN — VALPROIC ACID 250 MG: 250 SOLUTION ORAL at 14:56

## 2024-03-16 RX ADMIN — Medication 10 ML: at 08:08

## 2024-03-16 RX ADMIN — PANTOPRAZOLE SODIUM 40 MG: 40 INJECTION, POWDER, FOR SOLUTION INTRAVENOUS at 21:20

## 2024-03-16 RX ADMIN — ITRACONAZOLE 200 MG: 10 SOLUTION ORAL at 03:44

## 2024-03-16 RX ADMIN — WHITE PETROLATUM 57.7 %-MINERAL OIL 31.9 % EYE OINTMENT: at 05:51

## 2024-03-16 RX ADMIN — CARVEDILOL 12.5 MG: 12.5 TABLET, FILM COATED ORAL at 21:20

## 2024-03-16 RX ADMIN — DOCUSATE SODIUM 100 MG: 50 LIQUID ORAL at 21:20

## 2024-03-16 RX ADMIN — WHITE PETROLATUM 57.7 %-MINERAL OIL 31.9 % EYE OINTMENT: at 03:44

## 2024-03-16 RX ADMIN — TIMOLOL MALEATE 1 DROP: 5 SOLUTION/ DROPS OPHTHALMIC at 08:08

## 2024-03-16 RX ADMIN — HUMAN INSULIN 4 UNITS: 100 INJECTION, SOLUTION SUBCUTANEOUS at 12:13

## 2024-03-16 RX ADMIN — ALBUTEROL SULFATE 2.5 MG: 2.5 SOLUTION RESPIRATORY (INHALATION) at 00:36

## 2024-03-16 RX ADMIN — HUMAN INSULIN 2 UNITS: 100 INJECTION, SOLUTION SUBCUTANEOUS at 17:57

## 2024-03-16 RX ADMIN — SENNOSIDES 10 ML: 8.8 LIQUID ORAL at 08:08

## 2024-03-16 RX ADMIN — POLYETHYLENE GLYCOL 3350 17 G: 17 POWDER, FOR SOLUTION ORAL at 08:07

## 2024-03-16 RX ADMIN — DEXMEDETOMIDINE HYDROCHLORIDE 1.4 MCG/KG/HR: 400 INJECTION, SOLUTION INTRAVENOUS at 15:53

## 2024-03-16 RX ADMIN — TERAZOSIN HYDROCHLORIDE ANHYDROUS 5 MG: 5 CAPSULE ORAL at 08:07

## 2024-03-16 RX ADMIN — DOCUSATE SODIUM 100 MG: 50 LIQUID ORAL at 08:08

## 2024-03-16 RX ADMIN — POLYETHYLENE GLYCOL 3350 17 G: 17 POWDER, FOR SOLUTION ORAL at 21:21

## 2024-03-16 RX ADMIN — AMLODIPINE BESYLATE 10 MG: 10 TABLET ORAL at 08:07

## 2024-03-16 RX ADMIN — ITRACONAZOLE 200 MG: 10 SOLUTION ORAL at 15:53

## 2024-03-16 RX ADMIN — DEXMEDETOMIDINE HYDROCHLORIDE 1.4 MCG/KG/HR: 400 INJECTION, SOLUTION INTRAVENOUS at 22:13

## 2024-03-16 RX ADMIN — WHITE PETROLATUM 57.7 %-MINERAL OIL 31.9 % EYE OINTMENT: at 17:58

## 2024-03-16 RX ADMIN — DEXMEDETOMIDINE HYDROCHLORIDE 1.4 MCG/KG/HR: 400 INJECTION, SOLUTION INTRAVENOUS at 06:59

## 2024-03-16 RX ADMIN — ERTAPENEM 1000 MG: 1 INJECTION INTRAMUSCULAR; INTRAVENOUS at 17:58

## 2024-03-16 RX ADMIN — CEFEPIME 2000 MG: 2 INJECTION, POWDER, FOR SOLUTION INTRAVENOUS at 05:51

## 2024-03-16 RX ADMIN — BRIMONIDINE TARTRATE 1 DROP: 2 SOLUTION/ DROPS OPHTHALMIC at 08:08

## 2024-03-16 RX ADMIN — ALBUTEROL SULFATE 2.5 MG: 2.5 SOLUTION RESPIRATORY (INHALATION) at 12:11

## 2024-03-16 RX ADMIN — WHITE PETROLATUM 57.7 %-MINERAL OIL 31.9 % EYE OINTMENT: at 14:52

## 2024-03-16 RX ADMIN — 0.12% CHLORHEXIDINE GLUCONATE 15 ML: 1.2 RINSE ORAL at 21:19

## 2024-03-16 RX ADMIN — ALBUTEROL SULFATE 2.5 MG: 2.5 SOLUTION RESPIRATORY (INHALATION) at 19:07

## 2024-03-16 RX ADMIN — DEXMEDETOMIDINE HYDROCHLORIDE 1.4 MCG/KG/HR: 400 INJECTION, SOLUTION INTRAVENOUS at 19:09

## 2024-03-16 RX ADMIN — DEXMEDETOMIDINE HYDROCHLORIDE 1.4 MCG/KG/HR: 400 INJECTION, SOLUTION INTRAVENOUS at 00:42

## 2024-03-16 RX ADMIN — HYDRALAZINE HYDROCHLORIDE 100 MG: 50 TABLET ORAL at 13:04

## 2024-03-16 RX ADMIN — TIMOLOL MALEATE 1 DROP: 5 SOLUTION/ DROPS OPHTHALMIC at 21:20

## 2024-03-16 RX ADMIN — Medication 20 MG: at 03:50

## 2024-03-16 RX ADMIN — SENNOSIDES 10 ML: 8.8 LIQUID ORAL at 21:20

## 2024-03-16 RX ADMIN — Medication 10 ML: at 21:21

## 2024-03-16 RX ADMIN — BRIMONIDINE TARTRATE 1 DROP: 2 SOLUTION/ DROPS OPHTHALMIC at 21:20

## 2024-03-16 RX ADMIN — DEXMEDETOMIDINE HYDROCHLORIDE 1.4 MCG/KG/HR: 400 INJECTION, SOLUTION INTRAVENOUS at 03:54

## 2024-03-16 RX ADMIN — FENTANYL CITRATE 250 MCG/HR: 50 INJECTION, SOLUTION INTRAMUSCULAR; INTRAVENOUS at 03:42

## 2024-03-16 RX ADMIN — BRIMONIDINE TARTRATE 1 DROP: 2 SOLUTION/ DROPS OPHTHALMIC at 16:00

## 2024-03-16 RX ADMIN — TERAZOSIN HYDROCHLORIDE ANHYDROUS 5 MG: 5 CAPSULE ORAL at 21:21

## 2024-03-16 RX ADMIN — PANTOPRAZOLE SODIUM 40 MG: 40 INJECTION, POWDER, FOR SOLUTION INTRAVENOUS at 08:08

## 2024-03-16 NOTE — PLAN OF CARE
Goal Outcome Evaluation:  Plan of Care Reviewed With: patient        Progress: no change    Outcome Evaluation: Pt not following commands, spastic/jerking movements. Intubated and sedated with precedex and fentanyl. Tube feedings continued. Afebrile. UOP adequate. BUE wrist restraints for pt safety.

## 2024-03-16 NOTE — PROGRESS NOTES
Intensive Care Follow-up     Hospital:  LOS: 6 days   Mr. Omkar Nava, 68 y.o. male is followed for:   Acute respiratory failure with hypoxemia            History of present illness:   Debilitated 68-year-old gentleman with diabetes mellitus complicated by blindness, osteomyelitis, chronic kidney disease, underlying dementia with psychosis, dependent for mobility, bathing and dressing, chronic anemia, heart failure with normal EF who is now admitted for his third admission in the last 2.5 months for an aspiration event with aspiration pneumonia.  On his previous admission a modified barium swallow did reveal moderate oral and mild pharyngeal dysphagia February 5, 2024.  Respiratory status deteriorated overnight on March 10 and he required intubation.  He initially was requiring 35% oxygen over March 13, 14th oxygenation worsened and he required 80% FiO2.  CTA of the chest was repeated and was negative for PE but revealed worsening bilateral pneumonia.  He developed abdominal distention.  CTA of the abdomen revealed no bowel obstruction or ischemic bowel.       Subjective   Interval History:  Patient stable on mechanical ventilation this morning on Precedex and fentanyl.  FiO2 down to 40% and PEEP at 12.             The patient's past medical, surgical and social history were reviewed and updated in Epic as appropriate.       Objective     Infusions:  dexmedetomidine, 0.2-1.5 mcg/kg/hr (Order-Specific), Last Rate: 1.4 mcg/kg/hr (03/16/24 0950)  fentanyl 10 mcg/mL,  mcg/hr, Last Rate: 250 mcg/hr (03/16/24 0342)  norepinephrine, 0.02-0.3 mcg/kg/min  Pharmacy to dose vancomycin,       Medications:  albuterol, 2.5 mg, Nebulization, Q6H - RT  amLODIPine, 10 mg, Nasogastric, Q24H  artificial tears, , Both Eyes, Q4H  brimonidine, 1 drop, Both Eyes, TID   And  timolol, 1 drop, Both Eyes, BID  carvedilol, 12.5 mg, Nasogastric, Q12H  cefepime, 2,000 mg, Intravenous, Q12H  chlorhexidine, 15 mL, Mouth/Throat,  Q12H  sennosides, 10 mL, Nasogastric, BID   And  docusate sodium, 100 mg, Nasogastric, BID  hydrALAZINE, 100 mg, Nasogastric, Q8H  insulin regular, 2-9 Units, Subcutaneous, Q6H  itraconazole, 200 mg, Nasogastric, Q12H  pantoprazole, 40 mg, Intravenous, Q12H  polyethylene glycol, 17 g, Nasogastric, BID  sodium chloride, 10 mL, Intravenous, Q12H  terazosin, 5 mg, Nasogastric, Q12H  Valproic Acid, 250 mg, Nasogastric, Q8H  vancomycin (dosing per levels), , Does not apply, Daily      I reviewed the patient's medications.    Vital Sign Min/Max for last 24 hours  Temp  Min: 98.6 °F (37 °C)  Max: 99.9 °F (37.7 °C)   BP  Min: 131/66  Max: 169/73   Pulse  Min: 63  Max: 75   Resp  Min: 18  Max: 18   SpO2  Min: 90 %  Max: 98 %   No data recorded       Input/Output for last 24 hour shift  03/15 0701 - 03/16 0700  In: 2652.2 [I.V.:1860.5]  Out: 3765 [Urine:3765]   Mode: VC+/AC  FiO2 (%):  [40 %] 40 %  S RR:  [18] 18  S VT:  [420 mL] 420 mL  PEEP/CPAP (cm H2O):  [12 cm H20] 12 cm H20  MAP (cm H2O):  [17-19] 19  GENERAL : Sedated, lying in bed, NAD  RESPIRATORY/THORAX : ET tube in place, Coarse breath sounds bilaterally  CARDIOVASCULAR : Normal S1/S2, RRR. 1+ lower ext edema.  GASTROINTESTINAL : Soft, NT/ND. BS x 4 normoactive. No hepatosplenomegaly.  MUSCULOSKELETAL : No cyanosis, clubbing, or ischemia  NEUROLOGICAL: Sedated, Moving all ext.    Results from last 7 days   Lab Units 03/15/24  0327 03/14/24  1948 03/14/24  1209 03/14/24  0358 03/12/24  0406   WBC 10*3/mm3 11.62*  --   --  10.64 9.22   HEMOGLOBIN g/dL 8.6* 9.0* 7.7* 6.8* 7.9*   PLATELETS 10*3/mm3 110*  --   --  126* 154     Results from last 7 days   Lab Units 03/16/24  0331 03/15/24  1519 03/15/24  0327 03/14/24  0358 03/13/24  0351 03/11/24  1228 03/11/24  0424   SODIUM mmol/L 150*  --  144 143 142   < > 143   POTASSIUM mmol/L 4.1 4.0 3.4* 3.6 4.0   < > 5.7*   CO2 mmol/L 23.0  --  26.0 25.0 24.0   < > 23.0   BUN mg/dL 39*  --  37* 35* 35*   < > 64*   CREATININE  mg/dL 2.06*  --  1.93* 1.69* 1.47*   < > 2.29*   MAGNESIUM mg/dL  --   --  1.9  --   --   --  2.4   PHOSPHORUS mg/dL  --   --  3.7 3.3 2.8   < > 3.4   GLUCOSE mg/dL 130*  --  68 63* 213*   < > 103*    < > = values in this interval not displayed.     Estimated Creatinine Clearance: 34.1 mL/min (A) (by C-G formula based on SCr of 2.06 mg/dL (H)).    Results from last 7 days   Lab Units 03/16/24  0447   PH, ARTERIAL pH units 7.396   PCO2, ARTERIAL mm Hg 45.7*   PO2 ART mm Hg 82.8*       I reviewed the patient's new clinical results.  I reviewed the patient's new imaging results/reports including actual images and agree with reports.       Imaging Results (Last 24 Hours)       Procedure Component Value Units Date/Time    XR Chest 1 View [[id number]] Collected: 03/16/24 0810     Updated: 03/16/24 0815    Narrative:      XR CHEST 1 VW    Date of Exam: 3/16/2024 3:25 AM EDT    Indication: to confirm placement of ET Tube -Manual Prone Protocol    Comparison: 3/15/2024    Findings:  ET tube, feeding tube and left IJ catheter appear to be in satisfactory position. The heart shadow appears borderline enlarged. There is a diffuse interstitial disease pattern present, whether interstitial edema or changes of ARDS, stable to slightly   improved from the prior study. No pneumothorax is seen.          Impression:      Impression:    1. ET tube remains in good position at the level of the clavicles.    2. Diffuse pulmonary interstitial disease, stable to slightly improved from prior study. No evidence of pneumothorax.      Electronically Signed: Wisam Oquendo MD    3/16/2024 8:12 AM EDT    Workstation ID: [id number]            Assessment & Plan   Impression        Acute respiratory failure with hypoxemia    Essential hypertension    Neurocognitive disorder    AMS (altered mental status)    Dementia    Dysphagia    Iron deficiency anemia    Protein calorie malnutrition    Type 2 diabetes mellitus with diabetic chronic kidney disease     Healthcare-associated pneumonia    Acute kidney injury superimposed on chronic kidney disease       Plan        Debilitated 68-year-old gentleman with diabetes mellitus complicated by blindness, osteomyelitis, chronic kidney disease, underlying dementia with psychosis, dependent for mobility, bathing and dressing, chronic anemia, heart failure with normal EF who is now admitted for his third admission in the last 2.5 months for an aspiration event with aspiration pneumonia.  On his previous admission a modified barium swallow did reveal moderate oral and mild pharyngeal dysphagia February 5, 2024.  Respiratory status deteriorated overnight on March 10 and he required intubation.  He initially was requiring 35% oxygen over March 13, 14th oxygenation worsened and he required 80% FiO2.  CTA of the chest was repeated and was negative for PE but revealed worsening bilateral pneumonia.  He developed abdominal distention.  CTA of the abdomen revealed no bowel obstruction or ischemic bowel.     -Continue mechanical ventilation, wean to saturation greater than 88%  -Continue Precedex and fentanyl for sedation  -Blasto and histo urinary antigens currently pending, currently on itraconazole and will stop if negative  -Continue cefepime and vancomycin  -Monitor renal function creatinine currently stable  -Chest x-ray continues to show bilateral infiltrates although gradually improving.  -Insulin as needed for blood sugar control, goal blood glucose less than 180  -Continue current antihypertensive medications  -Continue tube feeds  -GI prophylaxis  -DVT prophylaxis  -A.m. labs       I discussed the patient's findings and my recommendations with nursing staff     Critical Care time spent in direct patient care: 35 minutes (excluding procedure time, if applicable) including high complexity decision making to assess, manipulate, and support vital organ system failure in this individual who has impairment of one or more vital organ  systems such that there is a high probability of imminent or life threatening deterioration in the patient's condition.      Nancy Douglas DO  Pulmonary, Critical care and Sleep Medicine

## 2024-03-16 NOTE — PROGRESS NOTES
"Pharmacy Consult-Vancomycin Dosing  Omkar Nava is a  68 y.o. male receiving vancomycin therapy.     Indication:  Pneumonia  Consulting Provider: Intensivist  ID Consult:  No    Goal AUC: 400 - 600 mg/L*hr    Current Antimicrobial Therapy  Vancomycin (Day 7)  Cefepime (Day 7)  Itraconazole (Day 3)    Allergies  Allergies as of 03/10/2024    (No Known Allergies)     Labs    Results from last 7 days   Lab Units 03/15/24  0327 03/14/24  0358 03/13/24  0351   BUN mg/dL 37* 35* 35*   CREATININE mg/dL 1.93* 1.69* 1.47*     Results from last 7 days   Lab Units 03/15/24  0327 03/14/24  0358 03/12/24  0406   WBC 10*3/mm3 11.62* 10.64 9.22     Evaluation of Dosing     Is Patient on Dialysis or Renal Replacement: No    Ht - 151.9 cm (59.8\")  Wt - 91.1 kg (200 lb 13.4 oz)    Intake & Output (last 2 days)         03/14 0701  03/15 0700 03/15 0701  03/16 0700 03/16 0701  03/17 0700    I.V. (mL/kg) 1799.3 (19.6) 1860.5 (20.4)     Blood 732.8      Other  181     NG/ 558     IV Piggyback 37.2 52.7     Total Intake(mL/kg) 3099.2 (33.7) 2652.2 (29.1)     Urine (mL/kg/hr) 2550 (1.2) 3765 (1.7)     Total Output 2550 3765     Net +549.2 -1112.8                  Microbiology and Radiology  Microbiology Results (last 10 days)       Procedure Component Value - Date/Time    Respiratory Culture - Aspirate, ET Suction [[id number]]  (Abnormal) Collected: 03/14/24 1928    Lab Status: Preliminary result Specimen: Aspirate from ET Suction Updated: 03/15/24 1148     Respiratory Culture Moderate growth (3+) Gram Negative Bacilli      No Normal Respiratory Preethi     Gram Stain Moderate (3+) WBCs per low power field      Moderate (3+) Epithelial cells per low power field      Few (2+) Gram negative bacilli      Few (2+) Gram positive cocci in pairs, chains and clusters      Rare (1+) Yeast    Respiratory Culture - Sputum, NT Suction [[id number]]  (Abnormal) Collected: 03/11/24 0104    Lab Status: Final result Specimen: Sputum from NT Suction " Updated: 03/14/24 1247     Respiratory Culture Light growth (2+) Candida albicans      Rare Normal Respiratory Preethi     Gram Stain Many (4+) WBCs per low power field      Rare (1+) Epithelial cells per low power field      Rare (1+) Yeast    MRSA Screen, PCR (Inpatient) - Swab, Nares [[id number]]  (Abnormal) Collected: 03/10/24 1415    Lab Status: Final result Specimen: Swab from Nares Updated: 03/10/24 1621     MRSA PCR Positive    Narrative:      The negative predictive value of this diagnostic test is high and should only be used to consider de-escalating anti-MRSA therapy. A positive result may indicate colonization with MRSA and must be correlated clinically.    Respiratory Panel PCR w/COVID-19(SARS-CoV-2) GIANNI/SIENNA/ANNA/PAD/COR/ASHIA In-House, NP Swab in UTM/VTM, 2 HR TAT - Swab, Nasopharynx [[id number]]  (Normal) Collected: 03/10/24 1415    Lab Status: Final result Specimen: Swab from Nasopharynx Updated: 03/10/24 1518     ADENOVIRUS, PCR Not Detected     Coronavirus 229E Not Detected     Coronavirus HKU1 Not Detected     Coronavirus NL63 Not Detected     Coronavirus OC43 Not Detected     COVID19 Not Detected     Human Metapneumovirus Not Detected     Human Rhinovirus/Enterovirus Not Detected     Influenza A PCR Not Detected     Influenza B PCR Not Detected     Parainfluenza Virus 1 Not Detected     Parainfluenza Virus 2 Not Detected     Parainfluenza Virus 3 Not Detected     Parainfluenza Virus 4 Not Detected     RSV, PCR Not Detected     Bordetella pertussis pcr Not Detected     Bordetella parapertussis PCR Not Detected     Chlamydophila pneumoniae PCR Not Detected     Mycoplasma pneumo by PCR Not Detected    Narrative:      In the setting of a positive respiratory panel with a viral infection PLUS a negative procalcitonin without other underlying concern for bacterial infection, consider observing off antibiotics or discontinuation of antibiotics and continue supportive care. If the respiratory panel is positive  for atypical bacterial infection (Bordetella pertussis, Chlamydophila pneumoniae, or Mycoplasma pneumoniae), consider antibiotic de-escalation to target atypical bacterial infection.    Blood Culture - Blood, Hand, Right [[id number]]  (Normal) Collected: 03/10/24 0944    Lab Status: Final result Specimen: Blood from Hand, Right Updated: 03/15/24 1000     Blood Culture No growth at 5 days    Blood Culture - Blood, Arm, Right [[id number]]  (Abnormal) Collected: 03/10/24 0924    Lab Status: Preliminary result Specimen: Blood from Arm, Right Updated: 03/16/24 0614     Blood Culture Abnormal Stain     Gram Stain Anaerobic Bottle Gram positive bacilli    Narrative:      Blood culture does not meet the specified criteria for PCR identification.  If pregnant, immunocompromised, or clinical concern for meningitis, call lab to run BCID for Listeria monocytogenes.    COVID-19 and FLU A/B PCR, 1 HR TAT - Swab, Nasopharynx [[id number]]  (Normal) Collected: 03/10/24 0923    Lab Status: Final result Specimen: Swab from Nasopharynx Updated: 03/10/24 1017     COVID19 Not Detected     Influenza A PCR Not Detected     Influenza B PCR Not Detected    Narrative:      Fact sheet for providers: https://www.fda.gov/media/313141/download    Fact sheet for patients: https://www.fda.gov/media/604590/download    Test performed by PCR.          Reported Vancomycin Levels    Results from last 7 days   Lab Units 03/16/24  0331 03/15/24  0327 03/13/24  0351 03/12/24  0406 03/11/24  0424   VANCOMYCIN RM mcg/mL 13.90 24.60 16.30 14.20 14.90                 Assessment/Plan:  Levels noted above and reviewed.  Vancomycin 1250mg IV x 1.  Will pulse dose to observe if further accumulation occurs.  Random level assessment 3/17 with AM labs.  May be able to de-escalate therapy soon as current preliminary cultures show Gram negative bacilli.  Will monitor cultures.  Added BMP to AM labs to assess SCr trend and dosing of cefepime.  Pharmacy will continue to  follow and adjust dose based on renal function, drug levels, and clinical status.    Thanks,  Sarkis Shelton PharmD, BCPS, BCCCP  03/16/24  07:38 EDT

## 2024-03-16 NOTE — PROGRESS NOTES
"   LOS: 6 days    Patient Care Team:  Deann Cao MD as PCP - General (Internal Medicine)    Consulted for: Acute on chronic renal failure, hyperkalemia  68-year-old CKD stage III admitted with shortness of breath, choking of food at NH, admitted with JAYY, hyperkalemia, acute respiratory failure now intubated, anemia.  Later developed oliguria with increasing BUN/creatinine.  Patient was treated with Lokelma, bicarb, dextrose, insulin, calcium gluconate.  Subjective     Interval History:   Patient remained critically ill in ICU setting.  Remain on ventilator sedated.  Increasing BUN/creatinine is noted.      Review of Systems:    Review of systems could not be obtained due to  patient intubated. emergent nature of case.    Objective     Vital Sign Min/Max for last 24 hours  Temp  Min: 98.6 °F (37 °C)  Max: 99.9 °F (37.7 °C)   BP  Min: 131/66  Max: 169/73   Pulse  Min: 63  Max: 75   Resp  Min: 18  Max: 18   SpO2  Min: 93 %  Max: 98 %   No data recorded   Weight  Min: 91.1 kg (200 lb 13.4 oz)  Max: 91.1 kg (200 lb 13.4 oz)     Flowsheet Rows      Flowsheet Row First Filed Value   Admission Height 151.9 cm (59.8\") Documented at 03/10/2024 0910   Admission Weight 84.8 kg (187 lb) Documented at 03/10/2024 0910            I/O this shift:  In: 249.8 [I.V.:185.8; Other:21; NG/GT:43]  Out: 280 [Urine:280]  I/O last 3 completed shifts:  In: 3803.3 [I.V.:2774.4; Other:181; NG/GT:758; IV Piggyback:89.9]  Out: 4615 [Urine:4615]    Physical Exam:  General Appearance: -American male intubated on ventilator.  Oral intubated  Eyes: PER.  Neck: Restricted due to intubation no JVD  Lungs: Ventilator sound heard, equal chest movement, nonlabored.  Heart: No gallop, murmur, rub, RRR.  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  Area of abrasions and healing noted.   Story catheter " "in place.    WBC WBC   Date Value Ref Range Status   03/15/2024 11.62 (H) 3.40 - 10.80 10*3/mm3 Final   03/14/2024 10.64 3.40 - 10.80 10*3/mm3 Final      HGB Hemoglobin   Date Value Ref Range Status   03/15/2024 8.6 (L) 13.0 - 17.7 g/dL Final   03/14/2024 9.0 (L) 13.0 - 17.7 g/dL Final   03/14/2024 7.7 (L) 13.0 - 17.7 g/dL Final   03/14/2024 6.8 (C) 13.0 - 17.7 g/dL Final      HCT Hematocrit   Date Value Ref Range Status   03/15/2024 26.6 (L) 37.5 - 51.0 % Final   03/14/2024 27.9 (L) 37.5 - 51.0 % Final   03/14/2024 24.7 (L) 37.5 - 51.0 % Final   03/14/2024 22.3 (L) 37.5 - 51.0 % Final      Platlets No results found for: \"LABPLAT\"   MCV MCV   Date Value Ref Range Status   03/15/2024 89.0 79.0 - 97.0 fL Final   03/14/2024 91.0 79.0 - 97.0 fL Final          Sodium Sodium   Date Value Ref Range Status   03/16/2024 150 (H) 136 - 145 mmol/L Final   03/15/2024 144 136 - 145 mmol/L Final   03/14/2024 143 136 - 145 mmol/L Final      Potassium Potassium   Date Value Ref Range Status   03/16/2024 4.1 3.5 - 5.2 mmol/L Final   03/15/2024 4.0 3.5 - 5.2 mmol/L Final   03/15/2024 3.4 (L) 3.5 - 5.2 mmol/L Final   03/14/2024 3.6 3.5 - 5.2 mmol/L Final      Chloride Chloride   Date Value Ref Range Status   03/16/2024 112 (H) 98 - 107 mmol/L Final   03/15/2024 109 (H) 98 - 107 mmol/L Final   03/14/2024 109 (H) 98 - 107 mmol/L Final      CO2 CO2   Date Value Ref Range Status   03/16/2024 23.0 22.0 - 29.0 mmol/L Final   03/15/2024 26.0 22.0 - 29.0 mmol/L Final   03/14/2024 25.0 22.0 - 29.0 mmol/L Final      BUN BUN   Date Value Ref Range Status   03/16/2024 39 (H) 8 - 23 mg/dL Final   03/15/2024 37 (H) 8 - 23 mg/dL Final   03/14/2024 35 (H) 8 - 23 mg/dL Final      Creatinine Creatinine   Date Value Ref Range Status   03/16/2024 2.06 (H) 0.76 - 1.27 mg/dL Final   03/15/2024 1.93 (H) 0.76 - 1.27 mg/dL Final   03/14/2024 1.69 (H) 0.76 - 1.27 mg/dL Final      Calcium Calcium   Date Value Ref Range Status   03/16/2024 8.7 8.6 - 10.5 mg/dL " "Final   03/15/2024 8.7 8.6 - 10.5 mg/dL Final   03/14/2024 8.7 8.6 - 10.5 mg/dL Final      PO4 No results found for: \"CAPO4\"   Albumin Albumin   Date Value Ref Range Status   03/14/2024 3.1 (L) 3.5 - 5.2 g/dL Final   03/14/2024 3.2 (L) 3.5 - 5.2 g/dL Final      Magnesium Magnesium   Date Value Ref Range Status   03/15/2024 1.9 1.6 - 2.4 mg/dL Final        Uric Acid No results found for: \"URICACID\"        Results Review:     I reviewed the patient's new clinical results.    albuterol, 2.5 mg, Nebulization, Q6H - RT  amLODIPine, 10 mg, Nasogastric, Q24H  artificial tears, , Both Eyes, Q4H  brimonidine, 1 drop, Both Eyes, TID   And  timolol, 1 drop, Both Eyes, BID  carvedilol, 12.5 mg, Nasogastric, Q12H  cefepime, 2,000 mg, Intravenous, Q12H  chlorhexidine, 15 mL, Mouth/Throat, Q12H  sennosides, 10 mL, Nasogastric, BID   And  docusate sodium, 100 mg, Nasogastric, BID  furosemide, 40 mg, Intravenous, Q12H  hydrALAZINE, 100 mg, Nasogastric, Q8H  insulin regular, 2-9 Units, Subcutaneous, Q6H  itraconazole, 200 mg, Nasogastric, Q12H  pantoprazole, 40 mg, Intravenous, Q12H  polyethylene glycol, 17 g, Nasogastric, BID  sodium chloride, 10 mL, Intravenous, Q12H  terazosin, 5 mg, Nasogastric, Q12H  Valproic Acid, 250 mg, Nasogastric, Q8H  vancomycin (dosing per levels), , Does not apply, Daily      dexmedetomidine, 0.2-1.5 mcg/kg/hr (Order-Specific), Last Rate: 1.4 mcg/kg/hr (03/16/24 0950)  fentanyl 10 mcg/mL,  mcg/hr, Last Rate: 250 mcg/hr (03/16/24 0342)  norepinephrine, 0.02-0.3 mcg/kg/min  Pharmacy to dose vancomycin,         Medication Review: Reviewed    Assessment & Plan       Healthcare-associated pneumonia    Essential hypertension    Neurocognitive disorder    AMS (altered mental status)    Dementia    Dysphagia    Iron deficiency anemia    Protein calorie malnutrition    Type 2 diabetes mellitus with diabetic chronic kidney disease    Acute respiratory failure with hypoxia    Acute kidney injury superimposed " on chronic kidney disease    Hyperkalemia    Bradycardia    Hypothermia    Acute respiratory failure with hypoxemia    1.  Acute renal failure on CKD stage III acute rise in creatinine with recent event.  Decreased renal perfusion, hypoxia, IV contrast.  2.  Hyperkalemia multifactorial due to JAYY, respiratory failure, acidosis.  3.  CKD stage III recent creatinine 1.68.  CT abdomen showed normal-sized kidneys with no stones or hydronephrosis.ultrasound revealed right kidney 12.1 cm, left kidney 11.3 cm without hydronephrosis.   4.  Respite failure on ventilator CT angio negative for pulmonary embolism  5.  Essential hypertension  6.  Altered mental status  7.  Bradycardia  8.  Dementia     Recommendations:  Increase in creatinine, sodium is noted.  Edema is improved.  Will hold the diuretics.  Keep MAP greater than 65 mmHg.  Avoid nephrotoxic medication.  Adjust medication according to the new GFR  Case discussed with the staff RN in the room.  High risk complex patient with multiple medical problems.      Hailey Alexis MD  03/16/24  10:04 EDT

## 2024-03-16 NOTE — PROGRESS NOTES
Pharmacy Antimicrobial Stewardship Note    Briefly, this patient was admitted for respiratory failure.  Cultures have grown revealing ESBL K. Pneumoniae in respiratory cultures.  Patient is also on valproic acid for psychiatric indication.    Reviewed case with Intensivist NARGIS Blunt; we discussed drug interaction with valproic acid along with other therapies for pneumonia; note other therapies warranting consideration are shown to be resistant (Bactrim, Levaquin).  Tigecycline would not be appropriate in VAP.  Other choices for therapy would include Avycaz and other extended spectrum therapies reserved for special circumstances.    Plan is as follows:  Discontinue Cefepime  Discontinue valproic acid (as ertapenem interaction will rend it ineffective)  Initiate ertapenem 1g IV q24h x 7 days.  Restart valproic acid after ertapenem therapy, noting that it may take up to 1-2 weeks post therapy to retain effect.  Previous level assessment on 2/5 on previous admission was low.  No level assessed on this admission, so it is unknown if previous therapy ever obtained therapeutic level.  Will plan to utilize other therapies when appropriate for psych management until valproic acid can be resumed.  Patient takes other psychiatric meds at his facility we can utilize when deemed appropriate.    Thanks,  Sarkis Shelton, PharmD, BCPS, BCCCP  03/16/24  15:56 EDT

## 2024-03-17 ENCOUNTER — APPOINTMENT (OUTPATIENT)
Dept: GENERAL RADIOLOGY | Facility: HOSPITAL | Age: 69
End: 2024-03-17
Payer: MEDICARE

## 2024-03-17 LAB
ANION GAP SERPL CALCULATED.3IONS-SCNC: 12 MMOL/L (ref 5–15)
ARTERIAL PATENCY WRIST A: ABNORMAL
ATMOSPHERIC PRESS: ABNORMAL MM[HG]
BACTERIA SPEC AEROBE CULT: ABNORMAL
BASE EXCESS BLDA CALC-SCNC: 2.9 MMOL/L (ref 0–2)
BASOPHILS # BLD AUTO: 0.01 10*3/MM3 (ref 0–0.2)
BASOPHILS NFR BLD AUTO: 0.1 % (ref 0–1.5)
BDY SITE: ABNORMAL
BODY TEMPERATURE: 37
BUN SERPL-MCNC: 46 MG/DL (ref 8–23)
BUN/CREAT SERPL: 25.1 (ref 7–25)
CALCIUM SPEC-SCNC: 8.3 MG/DL (ref 8.6–10.5)
CHLORIDE SERPL-SCNC: 113 MMOL/L (ref 98–107)
CO2 BLDA-SCNC: 29.3 MMOL/L (ref 22–33)
CO2 SERPL-SCNC: 25 MMOL/L (ref 22–29)
COHGB MFR BLD: 1 % (ref 0–2)
CREAT SERPL-MCNC: 1.83 MG/DL (ref 0.76–1.27)
DEPRECATED RDW RBC AUTO: 55.7 FL (ref 37–54)
EGFRCR SERPLBLD CKD-EPI 2021: 40.2 ML/MIN/1.73
EOSINOPHIL # BLD AUTO: 0.1 10*3/MM3 (ref 0–0.4)
EOSINOPHIL NFR BLD AUTO: 1 % (ref 0.3–6.2)
ERYTHROCYTE [DISTWIDTH] IN BLOOD BY AUTOMATED COUNT: 17.1 % (ref 12.3–15.4)
GLUCOSE BLDC GLUCOMTR-MCNC: 125 MG/DL (ref 70–130)
GLUCOSE BLDC GLUCOMTR-MCNC: 158 MG/DL (ref 70–130)
GLUCOSE BLDC GLUCOMTR-MCNC: 188 MG/DL (ref 70–130)
GLUCOSE BLDC GLUCOMTR-MCNC: 230 MG/DL (ref 70–130)
GLUCOSE SERPL-MCNC: 134 MG/DL (ref 65–99)
GRAM STN SPEC: ABNORMAL
HCO3 BLDA-SCNC: 28 MMOL/L (ref 20–26)
HCT VFR BLD AUTO: 28.1 % (ref 37.5–51)
HCT VFR BLD CALC: 27.3 % (ref 38–51)
HGB BLD-MCNC: 8.7 G/DL (ref 13–17.7)
HGB BLDA-MCNC: 8.9 G/DL (ref 13.5–17.5)
IMM GRANULOCYTES # BLD AUTO: 0.08 10*3/MM3 (ref 0–0.05)
IMM GRANULOCYTES NFR BLD AUTO: 0.8 % (ref 0–0.5)
INHALED O2 CONCENTRATION: 50 %
ISOLATED FROM: ABNORMAL
LYMPHOCYTES # BLD AUTO: 0.75 10*3/MM3 (ref 0.7–3.1)
LYMPHOCYTES NFR BLD AUTO: 7.6 % (ref 19.6–45.3)
MAGNESIUM SERPL-MCNC: 2 MG/DL (ref 1.6–2.4)
MCH RBC QN AUTO: 27.6 PG (ref 26.6–33)
MCHC RBC AUTO-ENTMCNC: 31 G/DL (ref 31.5–35.7)
MCV RBC AUTO: 89.2 FL (ref 79–97)
METHGB BLD QL: 0.3 % (ref 0–1.5)
MODALITY: ABNORMAL
MONOCYTES # BLD AUTO: 0.98 10*3/MM3 (ref 0.1–0.9)
MONOCYTES NFR BLD AUTO: 9.9 % (ref 5–12)
NEUTROPHILS NFR BLD AUTO: 7.98 10*3/MM3 (ref 1.7–7)
NEUTROPHILS NFR BLD AUTO: 80.6 % (ref 42.7–76)
NRBC BLD AUTO-RTO: 0 /100 WBC (ref 0–0.2)
OXYHGB MFR BLDV: 96.9 % (ref 94–99)
PCO2 BLDA: 44.6 MM HG (ref 35–45)
PCO2 TEMP ADJ BLD: 44.6 MM HG (ref 35–48)
PEEP RESPIRATORY: 12 CM[H2O]
PH BLDA: 7.41 PH UNITS (ref 7.35–7.45)
PH, TEMP CORRECTED: 7.41 PH UNITS
PHOSPHATE SERPL-MCNC: 3 MG/DL (ref 2.5–4.5)
PLATELET # BLD AUTO: 137 10*3/MM3 (ref 140–450)
PMV BLD AUTO: 12.3 FL (ref 6–12)
PO2 BLDA: 106 MM HG (ref 83–108)
PO2 TEMP ADJ BLD: 106 MM HG (ref 83–108)
POTASSIUM SERPL-SCNC: 3.9 MMOL/L (ref 3.5–5.2)
RBC # BLD AUTO: 3.15 10*6/MM3 (ref 4.14–5.8)
SODIUM SERPL-SCNC: 150 MMOL/L (ref 136–145)
VANCOMYCIN SERPL-MCNC: 9.4 MCG/ML (ref 5–40)
VENTILATOR MODE: ABNORMAL
WBC NRBC COR # BLD AUTO: 9.9 10*3/MM3 (ref 3.4–10.8)

## 2024-03-17 PROCEDURE — 82948 REAGENT STRIP/BLOOD GLUCOSE: CPT

## 2024-03-17 PROCEDURE — 94799 UNLISTED PULMONARY SVC/PX: CPT

## 2024-03-17 PROCEDURE — 82375 ASSAY CARBOXYHB QUANT: CPT

## 2024-03-17 PROCEDURE — 25010000002 METHYLNALTREXONE 12 MG/0.6ML SOLUTION: Performed by: INTERNAL MEDICINE

## 2024-03-17 PROCEDURE — 63710000001 INSULIN REGULAR HUMAN PER 5 UNITS: Performed by: INTERNAL MEDICINE

## 2024-03-17 PROCEDURE — 25010000002 HEPARIN (PORCINE) PER 1000 UNITS: Performed by: INTERNAL MEDICINE

## 2024-03-17 PROCEDURE — 83735 ASSAY OF MAGNESIUM: CPT

## 2024-03-17 PROCEDURE — 94664 DEMO&/EVAL PT USE INHALER: CPT

## 2024-03-17 PROCEDURE — 99291 CRITICAL CARE FIRST HOUR: CPT | Performed by: INTERNAL MEDICINE

## 2024-03-17 PROCEDURE — 25810000003 SODIUM CHLORIDE 0.9 % SOLUTION: Performed by: INTERNAL MEDICINE

## 2024-03-17 PROCEDURE — 25010000002 FENTANYL CITRATE (PF) 2500 MCG/50ML SOLUTION: Performed by: INTERNAL MEDICINE

## 2024-03-17 PROCEDURE — 94761 N-INVAS EAR/PLS OXIMETRY MLT: CPT

## 2024-03-17 PROCEDURE — 85025 COMPLETE CBC W/AUTO DIFF WBC: CPT

## 2024-03-17 PROCEDURE — 25010000002 ERTAPENEM PER 500 MG

## 2024-03-17 PROCEDURE — 80048 BASIC METABOLIC PNL TOTAL CA: CPT

## 2024-03-17 PROCEDURE — 71045 X-RAY EXAM CHEST 1 VIEW: CPT

## 2024-03-17 PROCEDURE — 25010000002 VANCOMYCIN HCL IN NACL 1.5-0.9 GM/500ML-% SOLUTION

## 2024-03-17 PROCEDURE — 82805 BLOOD GASES W/O2 SATURATION: CPT

## 2024-03-17 PROCEDURE — 83050 HGB METHEMOGLOBIN QUAN: CPT

## 2024-03-17 PROCEDURE — 84100 ASSAY OF PHOSPHORUS: CPT

## 2024-03-17 PROCEDURE — 36600 WITHDRAWAL OF ARTERIAL BLOOD: CPT

## 2024-03-17 PROCEDURE — 80202 ASSAY OF VANCOMYCIN: CPT

## 2024-03-17 PROCEDURE — 94003 VENT MGMT INPAT SUBQ DAY: CPT

## 2024-03-17 RX ORDER — ACETAMINOPHEN 160 MG/5ML
650 SOLUTION ORAL EVERY 6 HOURS PRN
Status: DISCONTINUED | OUTPATIENT
Start: 2024-03-17 | End: 2024-04-04 | Stop reason: HOSPADM

## 2024-03-17 RX ORDER — VANCOMYCIN/0.9 % SOD CHLORIDE 1.5G/250ML
1500 PLASTIC BAG, INJECTION (ML) INTRAVENOUS ONCE
Status: DISCONTINUED | OUTPATIENT
Start: 2024-03-17 | End: 2024-03-17

## 2024-03-17 RX ORDER — HEPARIN SODIUM 5000 [USP'U]/ML
5000 INJECTION, SOLUTION INTRAVENOUS; SUBCUTANEOUS EVERY 8 HOURS SCHEDULED
Status: DISCONTINUED | OUTPATIENT
Start: 2024-03-17 | End: 2024-04-04 | Stop reason: HOSPADM

## 2024-03-17 RX ADMIN — ALBUTEROL SULFATE 2.5 MG: 2.5 SOLUTION RESPIRATORY (INHALATION) at 06:18

## 2024-03-17 RX ADMIN — FENTANYL CITRATE 250 MCG/HR: 50 INJECTION, SOLUTION INTRAMUSCULAR; INTRAVENOUS at 19:58

## 2024-03-17 RX ADMIN — BRIMONIDINE TARTRATE 1 DROP: 2 SOLUTION/ DROPS OPHTHALMIC at 16:19

## 2024-03-17 RX ADMIN — CARVEDILOL 12.5 MG: 12.5 TABLET, FILM COATED ORAL at 20:14

## 2024-03-17 RX ADMIN — WHITE PETROLATUM 57.7 %-MINERAL OIL 31.9 % EYE OINTMENT: at 17:57

## 2024-03-17 RX ADMIN — ALBUTEROL SULFATE 2.5 MG: 2.5 SOLUTION RESPIRATORY (INHALATION) at 19:32

## 2024-03-17 RX ADMIN — WHITE PETROLATUM 57.7 %-MINERAL OIL 31.9 % EYE OINTMENT: at 09:16

## 2024-03-17 RX ADMIN — HYDRALAZINE HYDROCHLORIDE 100 MG: 50 TABLET ORAL at 21:41

## 2024-03-17 RX ADMIN — POLYETHYLENE GLYCOL 3350 17 G: 17 POWDER, FOR SOLUTION ORAL at 09:09

## 2024-03-17 RX ADMIN — PANTOPRAZOLE SODIUM 40 MG: 40 INJECTION, POWDER, FOR SOLUTION INTRAVENOUS at 20:14

## 2024-03-17 RX ADMIN — DEXMEDETOMIDINE HYDROCHLORIDE 1.4 MCG/KG/HR: 400 INJECTION, SOLUTION INTRAVENOUS at 00:34

## 2024-03-17 RX ADMIN — DEXMEDETOMIDINE HYDROCHLORIDE 1.4 MCG/KG/HR: 400 INJECTION, SOLUTION INTRAVENOUS at 06:08

## 2024-03-17 RX ADMIN — TIMOLOL MALEATE 1 DROP: 5 SOLUTION/ DROPS OPHTHALMIC at 09:16

## 2024-03-17 RX ADMIN — HUMAN INSULIN 6 UNITS: 100 INJECTION, SOLUTION SUBCUTANEOUS at 00:41

## 2024-03-17 RX ADMIN — HYDRALAZINE HYDROCHLORIDE 100 MG: 50 TABLET ORAL at 14:31

## 2024-03-17 RX ADMIN — WHITE PETROLATUM 57.7 %-MINERAL OIL 31.9 % EYE OINTMENT: at 02:28

## 2024-03-17 RX ADMIN — WHITE PETROLATUM 57.7 %-MINERAL OIL 31.9 % EYE OINTMENT: at 14:30

## 2024-03-17 RX ADMIN — BRIMONIDINE TARTRATE 1 DROP: 2 SOLUTION/ DROPS OPHTHALMIC at 09:16

## 2024-03-17 RX ADMIN — ITRACONAZOLE 200 MG: 10 SOLUTION ORAL at 04:00

## 2024-03-17 RX ADMIN — HUMAN INSULIN 2 UNITS: 100 INJECTION, SOLUTION SUBCUTANEOUS at 12:23

## 2024-03-17 RX ADMIN — DEXMEDETOMIDINE HYDROCHLORIDE 1.4 MCG/KG/HR: 400 INJECTION, SOLUTION INTRAVENOUS at 09:09

## 2024-03-17 RX ADMIN — AMLODIPINE BESYLATE 10 MG: 10 TABLET ORAL at 09:08

## 2024-03-17 RX ADMIN — ERTAPENEM 1000 MG: 1 INJECTION INTRAMUSCULAR; INTRAVENOUS at 17:56

## 2024-03-17 RX ADMIN — DEXMEDETOMIDINE HYDROCHLORIDE 1.4 MCG/KG/HR: 400 INJECTION, SOLUTION INTRAVENOUS at 18:41

## 2024-03-17 RX ADMIN — CARVEDILOL 12.5 MG: 12.5 TABLET, FILM COATED ORAL at 09:08

## 2024-03-17 RX ADMIN — HEPARIN SODIUM 5000 UNITS: 5000 INJECTION INTRAVENOUS; SUBCUTANEOUS at 21:41

## 2024-03-17 RX ADMIN — 0.12% CHLORHEXIDINE GLUCONATE 15 ML: 1.2 RINSE ORAL at 09:09

## 2024-03-17 RX ADMIN — TERAZOSIN HYDROCHLORIDE ANHYDROUS 5 MG: 5 CAPSULE ORAL at 09:09

## 2024-03-17 RX ADMIN — DOCUSATE SODIUM 100 MG: 50 LIQUID ORAL at 09:09

## 2024-03-17 RX ADMIN — FENTANYL CITRATE 250 MCG/HR: 50 INJECTION, SOLUTION INTRAMUSCULAR; INTRAVENOUS at 09:09

## 2024-03-17 RX ADMIN — METHYLNALTREXONE BROMIDE 12 MG: 12 INJECTION, SOLUTION SUBCUTANEOUS at 10:47

## 2024-03-17 RX ADMIN — ACETAMINOPHEN 650 MG: 650 SOLUTION ORAL at 10:49

## 2024-03-17 RX ADMIN — Medication 1500 MG: at 09:08

## 2024-03-17 RX ADMIN — DEXMEDETOMIDINE HYDROCHLORIDE 1.4 MCG/KG/HR: 400 INJECTION, SOLUTION INTRAVENOUS at 03:10

## 2024-03-17 RX ADMIN — TERAZOSIN HYDROCHLORIDE ANHYDROUS 5 MG: 5 CAPSULE ORAL at 20:14

## 2024-03-17 RX ADMIN — HUMAN INSULIN 2 UNITS: 100 INJECTION, SOLUTION SUBCUTANEOUS at 23:49

## 2024-03-17 RX ADMIN — 0.12% CHLORHEXIDINE GLUCONATE 15 ML: 1.2 RINSE ORAL at 20:14

## 2024-03-17 RX ADMIN — Medication 10 ML: at 20:15

## 2024-03-17 RX ADMIN — PANTOPRAZOLE SODIUM 40 MG: 40 INJECTION, POWDER, FOR SOLUTION INTRAVENOUS at 09:09

## 2024-03-17 RX ADMIN — DEXMEDETOMIDINE HYDROCHLORIDE 1.4 MCG/KG/HR: 400 INJECTION, SOLUTION INTRAVENOUS at 12:23

## 2024-03-17 RX ADMIN — SENNOSIDES 10 ML: 8.8 LIQUID ORAL at 09:09

## 2024-03-17 RX ADMIN — DEXMEDETOMIDINE HYDROCHLORIDE 1.4 MCG/KG/HR: 400 INJECTION, SOLUTION INTRAVENOUS at 15:33

## 2024-03-17 RX ADMIN — Medication 10 ML: at 09:17

## 2024-03-17 RX ADMIN — HYDRALAZINE HYDROCHLORIDE 100 MG: 50 TABLET ORAL at 06:33

## 2024-03-17 RX ADMIN — ALBUTEROL SULFATE 2.5 MG: 2.5 SOLUTION RESPIRATORY (INHALATION) at 13:52

## 2024-03-17 RX ADMIN — WHITE PETROLATUM 57.7 %-MINERAL OIL 31.9 % EYE OINTMENT: at 06:08

## 2024-03-17 RX ADMIN — DEXMEDETOMIDINE HYDROCHLORIDE 1.4 MCG/KG/HR: 400 INJECTION, SOLUTION INTRAVENOUS at 21:41

## 2024-03-17 RX ADMIN — HEPARIN SODIUM 5000 UNITS: 5000 INJECTION INTRAVENOUS; SUBCUTANEOUS at 14:30

## 2024-03-17 RX ADMIN — BRIMONIDINE TARTRATE 1 DROP: 2 SOLUTION/ DROPS OPHTHALMIC at 20:15

## 2024-03-17 RX ADMIN — WHITE PETROLATUM 57.7 %-MINERAL OIL 31.9 % EYE OINTMENT: at 21:50

## 2024-03-17 RX ADMIN — TIMOLOL MALEATE 1 DROP: 5 SOLUTION/ DROPS OPHTHALMIC at 20:14

## 2024-03-17 RX ADMIN — HUMAN INSULIN 4 UNITS: 100 INJECTION, SOLUTION SUBCUTANEOUS at 17:56

## 2024-03-17 RX ADMIN — ALBUTEROL SULFATE 2.5 MG: 2.5 SOLUTION RESPIRATORY (INHALATION) at 02:09

## 2024-03-17 NOTE — PROGRESS NOTES
Intensive Care Follow-up     Hospital:  LOS: 7 days   Mr. Omkar Nava, 68 y.o. male is followed for:   Acute respiratory failure with hypoxemia            History of present illness:   Debilitated 68-year-old gentleman with diabetes mellitus complicated by blindness, osteomyelitis, chronic kidney disease, underlying dementia with psychosis, dependent for mobility, bathing and dressing, chronic anemia, heart failure with normal EF who is now admitted for his third admission in the last 2.5 months for an aspiration event with aspiration pneumonia.  On his previous admission a modified barium swallow did reveal moderate oral and mild pharyngeal dysphagia February 5, 2024.  Respiratory status deteriorated overnight on March 10 and he required intubation.  He initially was requiring 35% oxygen over March 13, 14th oxygenation worsened and he required 80% FiO2.  CTA of the chest was repeated and was negative for PE but revealed worsening bilateral pneumonia.  He developed abdominal distention.  CTA of the abdomen revealed no bowel obstruction or ischemic bowel.        Subjective   Interval History:  Patient stable on mechanical ventilation this morning.  Settings gradually and wean down.  Sputum culture positive for ESBL Klebsiella pneumonia.  Antibiotics adjusted yesterday afternoon.             The patient's past medical, surgical and social history were reviewed and updated in Epic as appropriate.       Objective     Infusions:  dexmedetomidine, 0.2-1.5 mcg/kg/hr (Order-Specific), Last Rate: 1.4 mcg/kg/hr (03/17/24 0909)  fentanyl 10 mcg/mL,  mcg/hr, Last Rate: 250 mcg/hr (03/17/24 0909)  norepinephrine, 0.02-0.3 mcg/kg/min      Medications:  albuterol, 2.5 mg, Nebulization, Q6H - RT  amLODIPine, 10 mg, Nasogastric, Q24H  artificial tears, , Both Eyes, Q4H  brimonidine, 1 drop, Both Eyes, TID   And  timolol, 1 drop, Both Eyes, BID  carvedilol, 12.5 mg, Nasogastric, Q12H  chlorhexidine, 15 mL, Mouth/Throat,  Q12H  sennosides, 10 mL, Nasogastric, BID   And  docusate sodium, 100 mg, Nasogastric, BID  ertapenem, 1,000 mg, Intravenous, Q24H  heparin (porcine), 5,000 Units, Subcutaneous, Q8H  hydrALAZINE, 100 mg, Nasogastric, Q8H  insulin regular, 2-9 Units, Subcutaneous, Q6H  itraconazole, 200 mg, Nasogastric, Q12H  pantoprazole, 40 mg, Intravenous, Q12H  polyethylene glycol, 17 g, Nasogastric, BID  sodium chloride, 10 mL, Intravenous, Q12H  terazosin, 5 mg, Nasogastric, Q12H      I reviewed the patient's medications.    Vital Sign Min/Max for last 24 hours  Temp  Min: 98.1 °F (36.7 °C)  Max: 101.7 °F (38.7 °C)   BP  Min: 124/71  Max: 158/66   Pulse  Min: 58  Max: 74   Resp  Min: 18  Max: 18   SpO2  Min: 90 %  Max: 99 %   No data recorded       Input/Output for last 24 hour shift  03/16 0701 - 03/17 0700  In: 1666.4 [I.V.:836.4]  Out: 1815 [Urine:1815]   Mode: VC+/AC  FiO2 (%):  [40 %-50 %] 50 %  S RR:  [18] 18  S VT:  [420 mL] 420 mL  PEEP/CPAP (cm H2O):  [8 cm H20-12 cm H20] 8 cm H20  MAP (cm H2O):  [15-18] 16  GENERAL : Sedated, lying in bed, NAD  RESPIRATORY/THORAX : ET tube in place, Coarse breath sounds bilaterally  CARDIOVASCULAR : Normal S1/S2, RRR. 1+ lower ext edema.  GASTROINTESTINAL : Soft, NT/ND. BS x 4 normoactive. No hepatosplenomegaly.  MUSCULOSKELETAL : No cyanosis, clubbing, or ischemia  NEUROLOGICAL: Sedated, Moving all ext    Results from last 7 days   Lab Units 03/17/24  0314 03/15/24  0327 03/14/24  1948 03/14/24  1209 03/14/24  0358   WBC 10*3/mm3 9.90 11.62*  --   --  10.64   HEMOGLOBIN g/dL 8.7* 8.6* 9.0*   < > 6.8*   PLATELETS 10*3/mm3 137* 110*  --   --  126*    < > = values in this interval not displayed.     Results from last 7 days   Lab Units 03/17/24  0314 03/16/24  0331 03/15/24  1519 03/15/24  0327 03/14/24  0358 03/11/24  1228 03/11/24  0424   SODIUM mmol/L 150* 150*  --  144 143   < > 143   POTASSIUM mmol/L 3.9 4.1 4.0 3.4* 3.6   < > 5.7*   CO2 mmol/L 25.0 23.0  --  26.0 25.0   < > 23.0    BUN mg/dL 46* 39*  --  37* 35*   < > 64*   CREATININE mg/dL 1.83* 2.06*  --  1.93* 1.69*   < > 2.29*   MAGNESIUM mg/dL 2.0  --   --  1.9  --   --  2.4   PHOSPHORUS mg/dL 3.0  --   --  3.7 3.3   < > 3.4   GLUCOSE mg/dL 134* 130*  --  68 63*   < > 103*    < > = values in this interval not displayed.     Estimated Creatinine Clearance: 40.1 mL/min (A) (by C-G formula based on SCr of 1.83 mg/dL (H)).    Results from last 7 days   Lab Units 03/17/24  0315   PH, ARTERIAL pH units 7.406   PCO2, ARTERIAL mm Hg 44.6   PO2 ART mm Hg 106.0       I reviewed the patient's new clinical results.  I reviewed the patient's new imaging results/reports including actual images and agree with reports.       Imaging Results (Last 24 Hours)       Procedure Component Value Units Date/Time    XR Chest 1 View [[id number]] Collected: 03/17/24 1013     Updated: 03/17/24 1018    Narrative:      XR CHEST 1 VW    Date of Exam: 3/17/2024 4:10 AM EDT    Indication: to confirm placement of ET Tube -Manual Prone Protocol    Comparison: 3/16/2024 at 0337 hours    Findings:  There is mild improvement in aeration throughout the lung bases. Residual infiltrates are noted bilaterally with diffuse interstitial changes. The cardiac silhouette and mediastinum are stable. The endotracheal tube is stable in position. The Dobbhoff   feeding tube extends below the diaphragm to the stomach. The left IJ central line is stable in position.      Impression:      Impression:  1.Mild improvement in aeration within the lung bases.  2.Residual multifocal infiltrates and interstitial changes are seen bilaterally.  3.Stable positioning of support lines/tubes.      Electronically Signed: Renny Canales MD    3/17/2024 10:15 AM EDT    Workstation ID: [id number]            Assessment & Plan   Impression        Acute respiratory failure with hypoxemia    Essential hypertension    Neurocognitive disorder    AMS (altered mental status)    Dementia    Dysphagia    Iron  deficiency anemia    Protein calorie malnutrition    Type 2 diabetes mellitus with diabetic chronic kidney disease    Healthcare-associated pneumonia    Acute kidney injury superimposed on chronic kidney disease       Plan        Debilitated 68-year-old gentleman with diabetes mellitus complicated by blindness, osteomyelitis, chronic kidney disease, underlying dementia with psychosis, dependent for mobility, bathing and dressing, chronic anemia, heart failure with normal EF who is now admitted for his third admission in the last 2.5 months for an aspiration event with aspiration pneumonia.  On his previous admission a modified barium swallow did reveal moderate oral and mild pharyngeal dysphagia February 5, 2024.  Respiratory status deteriorated overnight on March 10 and he required intubation.  He initially was requiring 35% oxygen over March 13, 14th oxygenation worsened and he required 80% FiO2.  CTA of the chest was repeated and was negative for PE but revealed worsening bilateral pneumonia.  He developed abdominal distention.  CTA of the abdomen revealed no bowel obstruction or ischemic bowel.      -Continue mechanical ventilation, wean to saturation greater than 88%  -Continue Precedex and fentanyl for sedation  -Fungal symptoms remain negative so far as treatment unlikely now that we are growing ESBL.  Will discontinue itraconazole  -Antibiotic switched over to ertapenem for ESBL Klebsiella pneumonia  -Monitor renal function creatinine currently stable  -Insulin as needed for blood sugar control, goal blood glucose less than 180  -Continue current antihypertensive medications  -Continue tube feeds  -Increased bowel regimen, docusate, MiraLAX, senna, and Relistor  -GI prophylaxis  -DVT prophylaxis  -A.m. labs       I discussed the patient's findings and my recommendations with family and nursing staff     Critical Care time spent in direct patient care: 35 minutes (excluding procedure time, if applicable)  including high complexity decision making to assess, manipulate, and support vital organ system failure in this individual who has impairment of one or more vital organ systems such that there is a high probability of imminent or life threatening deterioration in the patient's condition.      Nancy Douglas, DO  Pulmonary, Critical care and Sleep Medicine

## 2024-03-17 NOTE — PROGRESS NOTES
"Pharmacy Consult-Vancomycin Dosing  Omkar Nava is a  68 y.o. male receiving vancomycin therapy.     Indication:  Pneumonia  Consulting Provider: Intensivist  ID Consult:  No    Goal AUC: 400 - 600 mg/L*hr    Current Antimicrobial Therapy  Vancomycin (Day 8)  Ertapenem (Day 2)  Itraconazole (Day 4)    Allergies  Allergies as of 03/10/2024    (No Known Allergies)     Labs    Results from last 7 days   Lab Units 03/17/24  0314 03/16/24  0331 03/15/24  0327   BUN mg/dL 46* 39* 37*   CREATININE mg/dL 1.83* 2.06* 1.93*     Results from last 7 days   Lab Units 03/17/24  0314 03/15/24  0327 03/14/24  0358   WBC 10*3/mm3 9.90 11.62* 10.64     Evaluation of Dosing     Is Patient on Dialysis or Renal Replacement: No    Ht - 151.9 cm (59.8\")  Wt - 98.8 kg (217 lb 13 oz)    Intake & Output (last 2 days)         03/15 0701  03/16 0700 03/16 0701  03/17 0700 03/17 0701  03/18 0700    I.V. (mL/kg) 1860.5 (20.4) 836.4 (8.5)     Blood       Other 181 193     NG/ 637     IV Piggyback 52.7      Total Intake(mL/kg) 2652.2 (29.1) 1666.4 (16.9)     Urine (mL/kg/hr) 3765 (1.7) 1815 (0.8)     Total Output 3765 1815     Net -1112.8 -148.6                  Microbiology and Radiology  Microbiology Results (last 10 days)       Procedure Component Value - Date/Time    Respiratory Culture - Aspirate, ET Suction [[id number]]  (Abnormal)  (Susceptibility) Collected: 03/14/24 1928    Lab Status: Final result Specimen: Aspirate from ET Suction Updated: 03/16/24 1154     Respiratory Culture Moderate growth (3+) Klebsiella pneumoniae ESBL     Comment:   Consider infectious disease consult.  Susceptibility results may not correlate to clinical outcomes.         No Normal Respiratory Preethi     Gram Stain Moderate (3+) WBCs per low power field      Moderate (3+) Epithelial cells per low power field      Few (2+) Gram negative bacilli      Few (2+) Gram positive cocci in pairs, chains and clusters      Rare (1+) Yeast    Susceptibility        " Klebsiella pneumoniae ESBL      KB      Ertapenem <=0.5 ug/ml Susceptible      Levofloxacin 4 ug/ml Resistant      Meropenem <=0.25 ug/ml Susceptible      Tetracycline <=1 ug/ml Susceptible      Trimethoprim + Sulfamethoxazole >=320 ug/ml Resistant                       Susceptibility Comments       Klebsiella pneumoniae ESBL    Cefazolin sensitivity will not be reported for Enterobacteriaceae in non-urine isolates. If cefazolin is preferred, please call the microbiology lab to request an E-test.  With the exception of urinary-sourced infections, aminoglycosides should not be used as monotherapy.               Respiratory Culture - Sputum, NT Suction [[id number]]  (Abnormal) Collected: 03/11/24 0104    Lab Status: Final result Specimen: Sputum from NT Suction Updated: 03/14/24 1247     Respiratory Culture Light growth (2+) Candida albicans      Rare Normal Respiratory Preethi     Gram Stain Many (4+) WBCs per low power field      Rare (1+) Epithelial cells per low power field      Rare (1+) Yeast    MRSA Screen, PCR (Inpatient) - Swab, Nares [[id number]]  (Abnormal) Collected: 03/10/24 1415    Lab Status: Final result Specimen: Swab from Nares Updated: 03/10/24 1621     MRSA PCR Positive    Narrative:      The negative predictive value of this diagnostic test is high and should only be used to consider de-escalating anti-MRSA therapy. A positive result may indicate colonization with MRSA and must be correlated clinically.    Respiratory Panel PCR w/COVID-19(SARS-CoV-2) GIANNI/SIENNA/ANNA/PAD/COR/ASHIA In-House, NP Swab in UTM/VTM, 2 HR TAT - Swab, Nasopharynx [[id number]]  (Normal) Collected: 03/10/24 1415    Lab Status: Final result Specimen: Swab from Nasopharynx Updated: 03/10/24 1518     ADENOVIRUS, PCR Not Detected     Coronavirus 229E Not Detected     Coronavirus HKU1 Not Detected     Coronavirus NL63 Not Detected     Coronavirus OC43 Not Detected     COVID19 Not Detected     Human Metapneumovirus Not Detected     Human  Rhinovirus/Enterovirus Not Detected     Influenza A PCR Not Detected     Influenza B PCR Not Detected     Parainfluenza Virus 1 Not Detected     Parainfluenza Virus 2 Not Detected     Parainfluenza Virus 3 Not Detected     Parainfluenza Virus 4 Not Detected     RSV, PCR Not Detected     Bordetella pertussis pcr Not Detected     Bordetella parapertussis PCR Not Detected     Chlamydophila pneumoniae PCR Not Detected     Mycoplasma pneumo by PCR Not Detected    Narrative:      In the setting of a positive respiratory panel with a viral infection PLUS a negative procalcitonin without other underlying concern for bacterial infection, consider observing off antibiotics or discontinuation of antibiotics and continue supportive care. If the respiratory panel is positive for atypical bacterial infection (Bordetella pertussis, Chlamydophila pneumoniae, or Mycoplasma pneumoniae), consider antibiotic de-escalation to target atypical bacterial infection.    Blood Culture - Blood, Hand, Right [[id number]]  (Normal) Collected: 03/10/24 0944    Lab Status: Final result Specimen: Blood from Hand, Right Updated: 03/15/24 1000     Blood Culture No growth at 5 days    Blood Culture - Blood, Arm, Right [[id number]]  (Abnormal) Collected: 03/10/24 0924    Lab Status: Preliminary result Specimen: Blood from Arm, Right Updated: 03/16/24 0614     Blood Culture Abnormal Stain     Gram Stain Anaerobic Bottle Gram positive bacilli    Narrative:      Blood culture does not meet the specified criteria for PCR identification.  If pregnant, immunocompromised, or clinical concern for meningitis, call lab to run BCID for Listeria monocytogenes.    COVID-19 and FLU A/B PCR, 1 HR TAT - Swab, Nasopharynx [[id number]]  (Normal) Collected: 03/10/24 0923    Lab Status: Final result Specimen: Swab from Nasopharynx Updated: 03/10/24 1017     COVID19 Not Detected     Influenza A PCR Not Detected     Influenza B PCR Not Detected    Narrative:      Fact sheet for  providers: https://www.fda.gov/media/898160/download    Fact sheet for patients: https://www.fda.gov/media/418688/download    Test performed by PCR.          Reported Vancomycin Levels    Results from last 7 days   Lab Units 03/17/24  0314 03/16/24  0331 03/15/24  0327 03/13/24  0351 03/12/24  0406 03/11/24  0424   VANCOMYCIN RM mcg/mL 9.40 13.90 24.60 16.30 14.20 14.90                 Assessment/Plan:  Levels noted above and reviewed.  Vancomycin 1500mg IV x 1.  Will pulse dose to observe if further accumulation occurs.  I expect future dosing to be q48h dosing if continued long term.  Random level assessment 3/18 with AM labs.  Continue current dosing of ertapenem.  With finalization of sputum culture to show ESBL Klebsiella pneumoniae, would recommend de-escalation of vancomycin.  Will discuss with provider today.  Pharmacy will continue to follow and adjust dose based on renal function, drug levels, and clinical status.    Thanks,  Sarkis Shelton, PharmD, BCPS, BCCCP  03/17/24  07:26 EDT

## 2024-03-17 NOTE — PROGRESS NOTES
"   LOS: 7 days    Patient Care Team:  Deann Cao MD as PCP - General (Internal Medicine)    Consulted for: Acute on chronic renal failure, hyperkalemia  68-year-old CKD stage III admitted with shortness of breath, choking of food at NH, admitted with JAYY, hyperkalemia, acute respiratory failure now intubated, anemia.  Later developed oliguria with increasing BUN/creatinine.  Patient was treated with Lokelma, bicarb, dextrose, insulin, calcium gluconate.  Subjective     Interval History:   Patient remains critically ill in ICU setting.  Remain on ventilator.  Renal function slightly improved.  Other physician notes seen.        Review of Systems:    Review of systems could not be obtained due to  patient intubated. emergent nature of case.    Objective     Vital Sign Min/Max for last 24 hours  Temp  Min: 98.1 °F (36.7 °C)  Max: 99.5 °F (37.5 °C)   BP  Min: 124/71  Max: 152/59   Pulse  Min: 58  Max: 72   Resp  Min: 18  Max: 18   SpO2  Min: 90 %  Max: 99 %   No data recorded   Weight  Min: 98.8 kg (217 lb 13 oz)  Max: 98.8 kg (217 lb 13 oz)     Flowsheet Rows      Flowsheet Row First Filed Value   Admission Height 151.9 cm (59.8\") Documented at 03/10/2024 0910   Admission Weight 84.8 kg (187 lb) Documented at 03/10/2024 0910            No intake/output data recorded.  I/O last 3 completed shifts:  In: 2975.4 [I.V.:1514.7; Other:284; NG/GT:1124; IV Piggyback:52.7]  Out: 3790 [Urine:3790]    Physical Exam:  General Appearance: AA male critically ill remain on ventilator  Oral intubated.  Neck: Restricted due to intubation no JVD  Lungs: Ventilator sound heard, equal chest movement, nonlabored.  Heart: No gallop, murmur, rub, RRR.  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  Area of abrasions and healing noted.   Story catheter in place.    WBC WBC   Date Value Ref Range " "Status   03/17/2024 9.90 3.40 - 10.80 10*3/mm3 Final   03/15/2024 11.62 (H) 3.40 - 10.80 10*3/mm3 Final      HGB Hemoglobin   Date Value Ref Range Status   03/17/2024 8.7 (L) 13.0 - 17.7 g/dL Final   03/15/2024 8.6 (L) 13.0 - 17.7 g/dL Final   03/14/2024 9.0 (L) 13.0 - 17.7 g/dL Final   03/14/2024 7.7 (L) 13.0 - 17.7 g/dL Final      HCT Hematocrit   Date Value Ref Range Status   03/17/2024 28.1 (L) 37.5 - 51.0 % Final   03/15/2024 26.6 (L) 37.5 - 51.0 % Final   03/14/2024 27.9 (L) 37.5 - 51.0 % Final   03/14/2024 24.7 (L) 37.5 - 51.0 % Final      Platlets No results found for: \"LABPLAT\"   MCV MCV   Date Value Ref Range Status   03/17/2024 89.2 79.0 - 97.0 fL Final   03/15/2024 89.0 79.0 - 97.0 fL Final          Sodium Sodium   Date Value Ref Range Status   03/17/2024 150 (H) 136 - 145 mmol/L Final   03/16/2024 150 (H) 136 - 145 mmol/L Final   03/15/2024 144 136 - 145 mmol/L Final      Potassium Potassium   Date Value Ref Range Status   03/17/2024 3.9 3.5 - 5.2 mmol/L Final   03/16/2024 4.1 3.5 - 5.2 mmol/L Final   03/15/2024 4.0 3.5 - 5.2 mmol/L Final   03/15/2024 3.4 (L) 3.5 - 5.2 mmol/L Final      Chloride Chloride   Date Value Ref Range Status   03/17/2024 113 (H) 98 - 107 mmol/L Final   03/16/2024 112 (H) 98 - 107 mmol/L Final   03/15/2024 109 (H) 98 - 107 mmol/L Final      CO2 CO2   Date Value Ref Range Status   03/17/2024 25.0 22.0 - 29.0 mmol/L Final   03/16/2024 23.0 22.0 - 29.0 mmol/L Final   03/15/2024 26.0 22.0 - 29.0 mmol/L Final      BUN BUN   Date Value Ref Range Status   03/17/2024 46 (H) 8 - 23 mg/dL Final   03/16/2024 39 (H) 8 - 23 mg/dL Final   03/15/2024 37 (H) 8 - 23 mg/dL Final      Creatinine Creatinine   Date Value Ref Range Status   03/17/2024 1.83 (H) 0.76 - 1.27 mg/dL Final   03/16/2024 2.06 (H) 0.76 - 1.27 mg/dL Final   03/15/2024 1.93 (H) 0.76 - 1.27 mg/dL Final      Calcium Calcium   Date Value Ref Range Status   03/17/2024 8.3 (L) 8.6 - 10.5 mg/dL Final   03/16/2024 8.7 8.6 - 10.5 mg/dL " "Final   03/15/2024 8.7 8.6 - 10.5 mg/dL Final      PO4 No results found for: \"CAPO4\"   Albumin No results found for: \"ALBUMIN\"     Magnesium Magnesium   Date Value Ref Range Status   03/17/2024 2.0 1.6 - 2.4 mg/dL Final   03/15/2024 1.9 1.6 - 2.4 mg/dL Final        Uric Acid No results found for: \"URICACID\"        Results Review:     I reviewed the patient's new clinical results.    albuterol, 2.5 mg, Nebulization, Q6H - RT  amLODIPine, 10 mg, Nasogastric, Q24H  artificial tears, , Both Eyes, Q4H  brimonidine, 1 drop, Both Eyes, TID   And  timolol, 1 drop, Both Eyes, BID  carvedilol, 12.5 mg, Nasogastric, Q12H  chlorhexidine, 15 mL, Mouth/Throat, Q12H  sennosides, 10 mL, Nasogastric, BID   And  docusate sodium, 100 mg, Nasogastric, BID  ertapenem, 1,000 mg, Intravenous, Q24H  hydrALAZINE, 100 mg, Nasogastric, Q8H  insulin regular, 2-9 Units, Subcutaneous, Q6H  itraconazole, 200 mg, Nasogastric, Q12H  pantoprazole, 40 mg, Intravenous, Q12H  polyethylene glycol, 17 g, Nasogastric, BID  sodium chloride, 10 mL, Intravenous, Q12H  terazosin, 5 mg, Nasogastric, Q12H  vancomycin (dosing per levels), , Does not apply, Daily  vancomycin, 1,500 mg, Intravenous, Once      dexmedetomidine, 0.2-1.5 mcg/kg/hr (Order-Specific), Last Rate: 1.4 mcg/kg/hr (03/17/24 0608)  fentanyl 10 mcg/mL,  mcg/hr, Last Rate: 250 mcg/hr (03/16/24 2232)  norepinephrine, 0.02-0.3 mcg/kg/min  Pharmacy to dose vancomycin,         Medication Review: Reviewed    Assessment & Plan       Acute respiratory failure with hypoxemia    Essential hypertension    Neurocognitive disorder    AMS (altered mental status)    Dementia    Dysphagia    Iron deficiency anemia    Protein calorie malnutrition    Type 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  Acute renal failure on CKD stage III acute rise in creatinine with recent event.  Decreased " renal perfusion, hypoxia, IV contrast.  2.  Hyperkalemia multifactorial due to JAYY, respiratory failure, acidosis.  3.  CKD stage III recent creatinine 1.68.  CT abdomen showed normal-sized kidneys with no stones or hydronephrosis.ultrasound revealed right kidney 12.1 cm, left kidney 11.3 cm without hydronephrosis.   4.  Respite failure on ventilator CT angio negative for pulmonary embolism  5.  Essential hypertension  6.  Altered mental status  7.  Bradycardia  8.  Dementia     Recommendations:  Renal function slightly improved at this time.  Hypernatremia is noted.  Increase free water through feeding tube 20 mL/h.  Edema has improved, will hold the diuretics.  Keep MAP greater than 65 mmHg.  Avoid nephrotoxic medications.  High risk complex patient multiple medical problems.  Adjust medication according to new GFR.      Hailey Alexis MD  03/17/24  07:28 EDT

## 2024-03-18 ENCOUNTER — APPOINTMENT (OUTPATIENT)
Dept: GENERAL RADIOLOGY | Facility: HOSPITAL | Age: 69
End: 2024-03-18
Payer: MEDICARE

## 2024-03-18 LAB
ANION GAP SERPL CALCULATED.3IONS-SCNC: 10 MMOL/L (ref 5–15)
ARTERIAL PATENCY WRIST A: ABNORMAL
ATMOSPHERIC PRESS: ABNORMAL MM[HG]
BASE EXCESS BLDA CALC-SCNC: 1.3 MMOL/L (ref 0–2)
BASOPHILS # BLD AUTO: 0.03 10*3/MM3 (ref 0–0.2)
BASOPHILS NFR BLD AUTO: 0.3 % (ref 0–1.5)
BDY SITE: ABNORMAL
BODY TEMPERATURE: 37
BUN SERPL-MCNC: 48 MG/DL (ref 8–23)
BUN/CREAT SERPL: 28.1 (ref 7–25)
CALCIUM SPEC-SCNC: 8.3 MG/DL (ref 8.6–10.5)
CHLORIDE SERPL-SCNC: 116 MMOL/L (ref 98–107)
CO2 BLDA-SCNC: 27.9 MMOL/L (ref 22–33)
CO2 SERPL-SCNC: 23 MMOL/L (ref 22–29)
COHGB MFR BLD: 1.2 % (ref 0–2)
CREAT SERPL-MCNC: 1.71 MG/DL (ref 0.76–1.27)
DEPRECATED RDW RBC AUTO: 56.8 FL (ref 37–54)
EGFRCR SERPLBLD CKD-EPI 2021: 43.6 ML/MIN/1.73
EOSINOPHIL # BLD AUTO: 0.12 10*3/MM3 (ref 0–0.4)
EOSINOPHIL NFR BLD AUTO: 1.2 % (ref 0.3–6.2)
EPAP: 0
ERYTHROCYTE [DISTWIDTH] IN BLOOD BY AUTOMATED COUNT: 17 % (ref 12.3–15.4)
GLUCOSE BLDC GLUCOMTR-MCNC: 192 MG/DL (ref 70–130)
GLUCOSE BLDC GLUCOMTR-MCNC: 197 MG/DL (ref 70–130)
GLUCOSE BLDC GLUCOMTR-MCNC: 204 MG/DL (ref 70–130)
GLUCOSE SERPL-MCNC: 197 MG/DL (ref 65–99)
HCO3 BLDA-SCNC: 26.5 MMOL/L (ref 20–26)
HCT VFR BLD AUTO: 28.5 % (ref 37.5–51)
HCT VFR BLD CALC: 27.6 % (ref 38–51)
HGB BLD-MCNC: 8.6 G/DL (ref 13–17.7)
HGB BLDA-MCNC: 9 G/DL (ref 13.5–17.5)
IMM GRANULOCYTES # BLD AUTO: 0.12 10*3/MM3 (ref 0–0.05)
IMM GRANULOCYTES NFR BLD AUTO: 1.2 % (ref 0–0.5)
INHALED O2 CONCENTRATION: 50 %
IPAP: 0
LYMPHOCYTES # BLD AUTO: 0.99 10*3/MM3 (ref 0.7–3.1)
LYMPHOCYTES NFR BLD AUTO: 10.1 % (ref 19.6–45.3)
MAGNESIUM SERPL-MCNC: 2.2 MG/DL (ref 1.6–2.4)
MCH RBC QN AUTO: 27.6 PG (ref 26.6–33)
MCHC RBC AUTO-ENTMCNC: 30.2 G/DL (ref 31.5–35.7)
MCV RBC AUTO: 91.3 FL (ref 79–97)
METHGB BLD QL: 0.2 % (ref 0–1.5)
MODALITY: ABNORMAL
MONOCYTES # BLD AUTO: 1.02 10*3/MM3 (ref 0.1–0.9)
MONOCYTES NFR BLD AUTO: 10.4 % (ref 5–12)
NEUTROPHILS NFR BLD AUTO: 7.52 10*3/MM3 (ref 1.7–7)
NEUTROPHILS NFR BLD AUTO: 76.8 % (ref 42.7–76)
NRBC BLD AUTO-RTO: 0 /100 WBC (ref 0–0.2)
OXYHGB MFR BLDV: 88.9 % (ref 94–99)
PAW @ PEAK INSP FLOW SETTING VENT: 0 CMH2O
PCO2 BLDA: 44.2 MM HG (ref 35–45)
PCO2 TEMP ADJ BLD: 44.2 MM HG (ref 35–48)
PEEP RESPIRATORY: 5 CM[H2O]
PH BLDA: 7.39 PH UNITS (ref 7.35–7.45)
PH, TEMP CORRECTED: 7.39 PH UNITS
PHOSPHATE SERPL-MCNC: 2.4 MG/DL (ref 2.5–4.5)
PLATELET # BLD AUTO: 156 10*3/MM3 (ref 140–450)
PMV BLD AUTO: 11.7 FL (ref 6–12)
PO2 BLDA: 58.7 MM HG (ref 83–108)
PO2 TEMP ADJ BLD: 58.7 MM HG (ref 83–108)
POTASSIUM SERPL-SCNC: 3.6 MMOL/L (ref 3.5–5.2)
RBC # BLD AUTO: 3.12 10*6/MM3 (ref 4.14–5.8)
SODIUM SERPL-SCNC: 149 MMOL/L (ref 136–145)
TOTAL RATE: 18 BREATHS/MINUTE
VENTILATOR MODE: ABNORMAL
VT ON VENT VENT: 0.42 ML
WBC NRBC COR # BLD AUTO: 9.8 10*3/MM3 (ref 3.4–10.8)

## 2024-03-18 PROCEDURE — 63710000001 INSULIN REGULAR HUMAN PER 5 UNITS: Performed by: INTERNAL MEDICINE

## 2024-03-18 PROCEDURE — 83050 HGB METHEMOGLOBIN QUAN: CPT

## 2024-03-18 PROCEDURE — 25810000003 SODIUM CHLORIDE 0.9 % SOLUTION: Performed by: INTERNAL MEDICINE

## 2024-03-18 PROCEDURE — 84100 ASSAY OF PHOSPHORUS: CPT | Performed by: INTERNAL MEDICINE

## 2024-03-18 PROCEDURE — 82375 ASSAY CARBOXYHB QUANT: CPT

## 2024-03-18 PROCEDURE — 94799 UNLISTED PULMONARY SVC/PX: CPT

## 2024-03-18 PROCEDURE — 99233 SBSQ HOSP IP/OBS HIGH 50: CPT | Performed by: INTERNAL MEDICINE

## 2024-03-18 PROCEDURE — 83735 ASSAY OF MAGNESIUM: CPT | Performed by: INTERNAL MEDICINE

## 2024-03-18 PROCEDURE — 25010000002 FENTANYL CITRATE (PF) 2500 MCG/50ML SOLUTION: Performed by: INTERNAL MEDICINE

## 2024-03-18 PROCEDURE — 71045 X-RAY EXAM CHEST 1 VIEW: CPT

## 2024-03-18 PROCEDURE — 94664 DEMO&/EVAL PT USE INHALER: CPT

## 2024-03-18 PROCEDURE — 25010000002 ERTAPENEM PER 500 MG

## 2024-03-18 PROCEDURE — 82948 REAGENT STRIP/BLOOD GLUCOSE: CPT

## 2024-03-18 PROCEDURE — 85025 COMPLETE CBC W/AUTO DIFF WBC: CPT | Performed by: INTERNAL MEDICINE

## 2024-03-18 PROCEDURE — 25010000002 HEPARIN (PORCINE) PER 1000 UNITS: Performed by: INTERNAL MEDICINE

## 2024-03-18 PROCEDURE — 36600 WITHDRAWAL OF ARTERIAL BLOOD: CPT

## 2024-03-18 PROCEDURE — 80048 BASIC METABOLIC PNL TOTAL CA: CPT | Performed by: INTERNAL MEDICINE

## 2024-03-18 PROCEDURE — 82805 BLOOD GASES W/O2 SATURATION: CPT

## 2024-03-18 PROCEDURE — 94003 VENT MGMT INPAT SUBQ DAY: CPT

## 2024-03-18 PROCEDURE — 25010000002 FUROSEMIDE PER 20 MG: Performed by: STUDENT IN AN ORGANIZED HEALTH CARE EDUCATION/TRAINING PROGRAM

## 2024-03-18 PROCEDURE — 94761 N-INVAS EAR/PLS OXIMETRY MLT: CPT

## 2024-03-18 RX ORDER — POTASSIUM CHLORIDE 1.5 G/1.58G
40 POWDER, FOR SOLUTION ORAL ONCE
Status: COMPLETED | OUTPATIENT
Start: 2024-03-18 | End: 2024-03-18

## 2024-03-18 RX ORDER — QUETIAPINE FUMARATE 25 MG/1
25 TABLET, FILM COATED ORAL NIGHTLY
Status: DISCONTINUED | OUTPATIENT
Start: 2024-03-18 | End: 2024-03-21

## 2024-03-18 RX ORDER — FUROSEMIDE 10 MG/ML
40 INJECTION INTRAMUSCULAR; INTRAVENOUS ONCE
Status: COMPLETED | OUTPATIENT
Start: 2024-03-18 | End: 2024-03-18

## 2024-03-18 RX ORDER — OXYCODONE HYDROCHLORIDE 10 MG/1
10 TABLET ORAL EVERY 6 HOURS SCHEDULED
Status: DISCONTINUED | OUTPATIENT
Start: 2024-03-18 | End: 2024-03-23

## 2024-03-18 RX ORDER — CLONIDINE HYDROCHLORIDE 0.2 MG/1
0.2 TABLET ORAL EVERY 8 HOURS SCHEDULED
Status: DISCONTINUED | OUTPATIENT
Start: 2024-03-18 | End: 2024-03-18

## 2024-03-18 RX ORDER — ARIPIPRAZOLE 10 MG/1
5 TABLET ORAL DAILY
Status: DISCONTINUED | OUTPATIENT
Start: 2024-03-18 | End: 2024-04-04 | Stop reason: HOSPADM

## 2024-03-18 RX ORDER — QUETIAPINE FUMARATE 25 MG/1
12.5 TABLET, FILM COATED ORAL DAILY
Status: DISCONTINUED | OUTPATIENT
Start: 2024-03-18 | End: 2024-03-21

## 2024-03-18 RX ORDER — CLONIDINE HYDROCHLORIDE 0.2 MG/1
0.2 TABLET ORAL EVERY 8 HOURS SCHEDULED
Status: DISCONTINUED | OUTPATIENT
Start: 2024-03-18 | End: 2024-04-04 | Stop reason: HOSPADM

## 2024-03-18 RX ORDER — FLUOXETINE HYDROCHLORIDE 20 MG/1
20 CAPSULE ORAL 2 TIMES DAILY
Status: DISCONTINUED | OUTPATIENT
Start: 2024-03-18 | End: 2024-03-20

## 2024-03-18 RX ADMIN — WHITE PETROLATUM 57.7 %-MINERAL OIL 31.9 % EYE OINTMENT: at 21:55

## 2024-03-18 RX ADMIN — HYDRALAZINE HYDROCHLORIDE 100 MG: 50 TABLET ORAL at 21:54

## 2024-03-18 RX ADMIN — POTASSIUM & SODIUM PHOSPHATES POWDER PACK 280-160-250 MG 2 PACKET: 280-160-250 PACK at 09:00

## 2024-03-18 RX ADMIN — HUMAN INSULIN 4 UNITS: 100 INJECTION, SOLUTION SUBCUTANEOUS at 18:16

## 2024-03-18 RX ADMIN — FUROSEMIDE 40 MG: 10 INJECTION, SOLUTION INTRAMUSCULAR; INTRAVENOUS at 14:45

## 2024-03-18 RX ADMIN — DEXMEDETOMIDINE HYDROCHLORIDE 1.2 MCG/KG/HR: 400 INJECTION, SOLUTION INTRAVENOUS at 03:46

## 2024-03-18 RX ADMIN — PANTOPRAZOLE SODIUM 40 MG: 40 INJECTION, POWDER, FOR SOLUTION INTRAVENOUS at 08:59

## 2024-03-18 RX ADMIN — OXYCODONE HYDROCHLORIDE 10 MG: 10 TABLET ORAL at 12:20

## 2024-03-18 RX ADMIN — WHITE PETROLATUM 57.7 %-MINERAL OIL 31.9 % EYE OINTMENT: at 09:00

## 2024-03-18 RX ADMIN — AMLODIPINE BESYLATE 10 MG: 10 TABLET ORAL at 08:59

## 2024-03-18 RX ADMIN — DEXMEDETOMIDINE HYDROCHLORIDE 0.8 MCG/KG/HR: 400 INJECTION, SOLUTION INTRAVENOUS at 23:23

## 2024-03-18 RX ADMIN — CLONIDINE HYDROCHLORIDE 0.2 MG: 0.2 TABLET ORAL at 21:54

## 2024-03-18 RX ADMIN — Medication 10 ML: at 09:00

## 2024-03-18 RX ADMIN — WHITE PETROLATUM 57.7 %-MINERAL OIL 31.9 % EYE OINTMENT: at 18:17

## 2024-03-18 RX ADMIN — CARVEDILOL 12.5 MG: 12.5 TABLET, FILM COATED ORAL at 20:03

## 2024-03-18 RX ADMIN — PANTOPRAZOLE SODIUM 40 MG: 40 INJECTION, POWDER, FOR SOLUTION INTRAVENOUS at 20:03

## 2024-03-18 RX ADMIN — TERAZOSIN HYDROCHLORIDE ANHYDROUS 5 MG: 5 CAPSULE ORAL at 20:04

## 2024-03-18 RX ADMIN — OXYCODONE HYDROCHLORIDE 10 MG: 10 TABLET ORAL at 23:44

## 2024-03-18 RX ADMIN — CARVEDILOL 12.5 MG: 12.5 TABLET, FILM COATED ORAL at 08:59

## 2024-03-18 RX ADMIN — FENTANYL CITRATE 200 MCG/HR: 50 INJECTION, SOLUTION INTRAMUSCULAR; INTRAVENOUS at 16:10

## 2024-03-18 RX ADMIN — FLUOXETINE 20 MG: 20 CAPSULE ORAL at 20:04

## 2024-03-18 RX ADMIN — 0.12% CHLORHEXIDINE GLUCONATE 15 ML: 1.2 RINSE ORAL at 20:03

## 2024-03-18 RX ADMIN — BRIMONIDINE TARTRATE 1 DROP: 2 SOLUTION/ DROPS OPHTHALMIC at 20:04

## 2024-03-18 RX ADMIN — TIMOLOL MALEATE 1 DROP: 5 SOLUTION/ DROPS OPHTHALMIC at 20:04

## 2024-03-18 RX ADMIN — WHITE PETROLATUM 57.7 %-MINERAL OIL 31.9 % EYE OINTMENT: at 14:46

## 2024-03-18 RX ADMIN — HEPARIN SODIUM 5000 UNITS: 5000 INJECTION INTRAVENOUS; SUBCUTANEOUS at 06:07

## 2024-03-18 RX ADMIN — FLUOXETINE 20 MG: 20 CAPSULE ORAL at 12:20

## 2024-03-18 RX ADMIN — DEXMEDETOMIDINE HYDROCHLORIDE 1.2 MCG/KG/HR: 400 INJECTION, SOLUTION INTRAVENOUS at 10:32

## 2024-03-18 RX ADMIN — QUETIAPINE FUMARATE 12.5 MG: 25 TABLET ORAL at 12:20

## 2024-03-18 RX ADMIN — 0.12% CHLORHEXIDINE GLUCONATE 15 ML: 1.2 RINSE ORAL at 08:59

## 2024-03-18 RX ADMIN — BRIMONIDINE TARTRATE 1 DROP: 2 SOLUTION/ DROPS OPHTHALMIC at 16:55

## 2024-03-18 RX ADMIN — WHITE PETROLATUM 57.7 %-MINERAL OIL 31.9 % EYE OINTMENT: at 01:37

## 2024-03-18 RX ADMIN — ARIPIPRAZOLE 5 MG: 10 TABLET ORAL at 12:20

## 2024-03-18 RX ADMIN — POLYETHYLENE GLYCOL 3350 17 G: 17 POWDER, FOR SOLUTION ORAL at 20:03

## 2024-03-18 RX ADMIN — HEPARIN SODIUM 5000 UNITS: 5000 INJECTION INTRAVENOUS; SUBCUTANEOUS at 14:46

## 2024-03-18 RX ADMIN — TIMOLOL MALEATE 1 DROP: 5 SOLUTION/ DROPS OPHTHALMIC at 09:00

## 2024-03-18 RX ADMIN — WHITE PETROLATUM 57.7 %-MINERAL OIL 31.9 % EYE OINTMENT: at 06:08

## 2024-03-18 RX ADMIN — ALBUTEROL SULFATE 2.5 MG: 2.5 SOLUTION RESPIRATORY (INHALATION) at 07:34

## 2024-03-18 RX ADMIN — HYDRALAZINE HYDROCHLORIDE 100 MG: 50 TABLET ORAL at 06:08

## 2024-03-18 RX ADMIN — HYDRALAZINE HYDROCHLORIDE 100 MG: 50 TABLET ORAL at 14:46

## 2024-03-18 RX ADMIN — QUETIAPINE FUMARATE 25 MG: 25 TABLET ORAL at 20:03

## 2024-03-18 RX ADMIN — ALBUTEROL SULFATE 2.5 MG: 2.5 SOLUTION RESPIRATORY (INHALATION) at 01:31

## 2024-03-18 RX ADMIN — FENTANYL CITRATE 250 MCG/HR: 50 INJECTION, SOLUTION INTRAMUSCULAR; INTRAVENOUS at 06:15

## 2024-03-18 RX ADMIN — ALBUTEROL SULFATE 2.5 MG: 2.5 SOLUTION RESPIRATORY (INHALATION) at 19:08

## 2024-03-18 RX ADMIN — DEXMEDETOMIDINE HYDROCHLORIDE 1.2 MCG/KG/HR: 400 INJECTION, SOLUTION INTRAVENOUS at 07:02

## 2024-03-18 RX ADMIN — Medication 10 ML: at 20:05

## 2024-03-18 RX ADMIN — DOCUSATE SODIUM 100 MG: 50 LIQUID ORAL at 20:04

## 2024-03-18 RX ADMIN — HUMAN INSULIN 4 UNITS: 100 INJECTION, SOLUTION SUBCUTANEOUS at 06:08

## 2024-03-18 RX ADMIN — DEXMEDETOMIDINE HYDROCHLORIDE 1.2 MCG/KG/HR: 400 INJECTION, SOLUTION INTRAVENOUS at 00:37

## 2024-03-18 RX ADMIN — ALBUTEROL SULFATE 2.5 MG: 2.5 SOLUTION RESPIRATORY (INHALATION) at 13:08

## 2024-03-18 RX ADMIN — HUMAN INSULIN 2 UNITS: 100 INJECTION, SOLUTION SUBCUTANEOUS at 12:21

## 2024-03-18 RX ADMIN — OXYCODONE HYDROCHLORIDE 10 MG: 10 TABLET ORAL at 18:16

## 2024-03-18 RX ADMIN — HEPARIN SODIUM 5000 UNITS: 5000 INJECTION INTRAVENOUS; SUBCUTANEOUS at 21:54

## 2024-03-18 RX ADMIN — POTASSIUM CHLORIDE 40 MEQ: 1.5 POWDER, FOR SOLUTION ORAL at 12:20

## 2024-03-18 RX ADMIN — DOCUSATE SODIUM 100 MG: 50 LIQUID ORAL at 08:59

## 2024-03-18 RX ADMIN — DEXMEDETOMIDINE HYDROCHLORIDE 1.2 MCG/KG/HR: 400 INJECTION, SOLUTION INTRAVENOUS at 14:11

## 2024-03-18 RX ADMIN — BRIMONIDINE TARTRATE 1 DROP: 2 SOLUTION/ DROPS OPHTHALMIC at 09:00

## 2024-03-18 RX ADMIN — DEXMEDETOMIDINE HYDROCHLORIDE 0.8 MCG/KG/HR: 400 INJECTION, SOLUTION INTRAVENOUS at 18:16

## 2024-03-18 RX ADMIN — ERTAPENEM 1000 MG: 1 INJECTION INTRAMUSCULAR; INTRAVENOUS at 16:55

## 2024-03-18 RX ADMIN — TERAZOSIN HYDROCHLORIDE ANHYDROUS 5 MG: 5 CAPSULE ORAL at 08:59

## 2024-03-18 RX ADMIN — SENNOSIDES 10 ML: 8.8 LIQUID ORAL at 20:03

## 2024-03-18 RX ADMIN — HUMAN INSULIN 2 UNITS: 100 INJECTION, SOLUTION SUBCUTANEOUS at 23:44

## 2024-03-18 RX ADMIN — CLONIDINE HYDROCHLORIDE 0.2 MG: 0.2 TABLET ORAL at 14:46

## 2024-03-18 NOTE — PROGRESS NOTES
INTENSIVIST   PROGRESS NOTE     Hospital:  LOS: 8 days     Mr. Omkar Nava, 68 y.o. male is followed for a Chief Complaint of: Respiratory failure, pneumonia      Subjective   S     Interval History:  No acute events. Remains on mechanical ventilation.        The patient's relevant past medical, surgical and social history were reviewed and updated in Epic as appropriate.      ROS: Unable to obtain secondary to sedation and mechanical ventilation.     Objective   O     Vitals:  Temp  Min: 98.8 °F (37.1 °C)  Max: 99.9 °F (37.7 °C)  BP  Min: 125/59  Max: 155/60  Pulse  Min: 64  Max: 76  Resp  Min: 18  Max: 19  SpO2  Min: 88 %  Max: 98 % No data recorded    Intake/Ouptut 24 hrs (7:00AM - 6:59 AM)  Intake & Output (last 3 days)         03/15 0701  03/16 0700 03/16 0701  03/17 0700 03/17 0701  03/18 0700 03/18 0701 03/19 0700    I.V. (mL/kg) 1860.5 (20.4) 836.4 (8.5) 2531.7 (25.6) 398.6 (4)    Blood        Other 181 193 321 170    NG/ 637 722 243    IV Piggyback 52.7  278.5     Total Intake(mL/kg) 2652.2 (29.1) 1666.4 (16.9) 3853.2 (39) 811.6 (8.2)    Urine (mL/kg/hr) 3765 (1.7) 1815 (0.8) 1475 (0.6) 325 (0.4)    Stool   0     Total Output 3765 1815 1475 325    Net -1112.8 -148.6 +2378.2 +486.6            Stool Unmeasured Occurrence   3 x             Medications (drips):  dexmedetomidine, Last Rate: 1.2 mcg/kg/hr (03/18/24 1411)  fentanyl 10 mcg/mL, Last Rate: 200 mcg/hr (03/18/24 1454)        Mechanical Ventilator Settings:          Resp Rate (Set): 18     FiO2 (%): 45 %  PEEP/CPAP (cm H2O): 5 cm H20    Minute Ventilation (L/min) (Obs): 7.28 L/min  Resp Rate (Observed) Vent: 18  I:E Ratio (Set): 1:2.33  I:E Ratio (Obs): 1:2.3    PIP Observed (cm H2O): 26 cm H2O  Plateau Pressure (cm H2O): 24 cm H2O    Physical Examination  Telemetry:  Normal sinus rhythm.    Constitutional:  No acute distress.  ETT in place on mechanical ventilation.    Eyes: No scleral icterus.   PERRL, EOM intact.    Neck:  Supple, FROM    Cardiovascular: Normal rate, regular and rhythm. Normal heart sounds.  No murmurs, gallop or rub.   Respiratory: No respiratory distress. Normal respiratory effort.  Diminished. No wheezing.    Abdominal:  Soft. No masses. Nontender. No distension. No HSM.   Extremities: No digital cyanosis. No clubbing.  No peripheral edema.   Skin: No rashes, lesions or ulcers   Neurological:   Sedated. Moves extremities to stimulation.              Results from last 7 days   Lab Units 03/18/24  0322 03/17/24  0314 03/15/24  0327   WBC 10*3/mm3 9.80 9.90 11.62*   HEMOGLOBIN g/dL 8.6* 8.7* 8.6*   MCV fL 91.3 89.2 89.0   PLATELETS 10*3/mm3 156 137* 110*     Results from last 7 days   Lab Units 03/18/24  0322 03/17/24 0314 03/16/24  0331 03/15/24  1519 03/15/24  0327   SODIUM mmol/L 149* 150* 150*  --  144   POTASSIUM mmol/L 3.6 3.9 4.1   < > 3.4*   CO2 mmol/L 23.0 25.0 23.0  --  26.0   CREATININE mg/dL 1.71* 1.83* 2.06*  --  1.93*   GLUCOSE mg/dL 197* 134* 130*  --  68   MAGNESIUM mg/dL 2.2 2.0  --   --  1.9   PHOSPHORUS mg/dL 2.4* 3.0  --   --  3.7    < > = values in this interval not displayed.     Estimated Creatinine Clearance: 42.9 mL/min (A) (by C-G formula based on SCr of 1.71 mg/dL (H)).  Results from last 7 days   Lab Units 03/14/24  0358   ALK PHOS U/L 83   BILIRUBIN mg/dL 0.2   BILIRUBIN DIRECT mg/dL <0.2   ALT (SGPT) U/L 33   AST (SGOT) U/L 30       Results from last 7 days   Lab Units 03/18/24  0426 03/17/24  0315 03/16/24  0447 03/15/24  0325 03/14/24  2151   PH, ARTERIAL pH units 7.387 7.406 7.396 7.410 7.389   PCO2, ARTERIAL mm Hg 44.2 44.6 45.7* 44.1 46.7*   PO2 ART mm Hg 58.7* 106.0 82.8* 87.1 102.0   FIO2 % 50 50 40 50 60       Images:  Imaging Results (Last 24 Hours)       Procedure Component Value Units Date/Time    XR Chest 1 View [[id number]] Collected: 03/18/24 0559     Updated: 03/18/24 0603    Narrative:      XR CHEST 1 VW    Date of Exam: 3/18/2024 2:13 AM EDT    Indication: to confirm placement of ET  Tube -Manual Prone Protocol    Comparison: 1 day prior.    Findings:  Support hardware projects unchanged. Mildly worsened aeration with the appearance of increased layering bilateral pleural effusions. No new focal consolidation otherwise. No distinct pneumothorax. Unchanged cardiac enlargement.      Impression:      Impression:  Support hardware projects unchanged. Mildly worsened aeration with the appearance of increased layering bilateral pleural effusions. No new focal consolidation otherwise. No distinct pneumothorax. Unchanged cardiac enlargement.          Electronically Signed: Boom Rodriguez MD    3/18/2024 6:00 AM EDT    Workstation ID: [id number]               Results: Reviewed.  I reviewed the patient's new laboratory and imaging results.  I independently reviewed the patient's new images.    Medications: Reviewed.    Assessment & Plan   A / P     Mr. Nava is a 68-year-old gentleman with diabetes mellitus complicated by blindness, osteomyelitis, chronic kidney disease, underlying dementia with psychosis, dependent for mobility, bathing and dressing, chronic anemia, heart failure with normal EF who is now admitted for his third admission in the last 2.5 months for an aspiration event with aspiration pneumonia.  On his previous admission a modified barium swallow did reveal moderate oral and mild pharyngeal dysphagia February 5, 2024.  Respiratory status deteriorated overnight on March 10 and he required intubation.  He initially was requiring 35% oxygen over March 13, 14th oxygenation worsened and he required 80% FiO2.  CTA of the chest was repeated and was negative for PE but revealed worsening bilateral pneumonia.  He developed abdominal distention.  CTA of the abdomen revealed no bowel obstruction or ischemic bowel.       Respiratory cultures are growing ESBL Klebsiella and antibiotics were changed to Ertapenem on 3/16.     Nutrition:   NPO Diet NPO Type: Strict NPO  Advance Directives:   Code Status  and Medical Interventions:   Ordered at: 03/10/24 1302     Code Status (Patient has no pulse and is not breathing):    CPR (Attempt to Resuscitate)     Medical Interventions (Patient has pulse or is breathing):    Full Support       Active Hospital Problems    Diagnosis     **Acute respiratory failure with hypoxemia     Healthcare-associated pneumonia     Acute kidney injury superimposed on chronic kidney disease     AMS (altered mental status)     Dysphagia     Dementia     Protein calorie malnutrition     Neurocognitive disorder     Essential hypertension     Type 2 diabetes mellitus with diabetic chronic kidney disease     Iron deficiency anemia        Assessment / Plan:    Continue mechanical ventilation. Titrate FiO2 as tolerated.   Precedex and Fentanyl for sedation. Wean as tolerated.   Restart home psychiatric medications.   Replace potassium  Continue bowel regimen  Continue tube feeds.   Ertapenem for pneumonia.   Nephrology following for JAYY.   AM labs and CXR.     High risk secondary to management of mechanical ventilation.     High level of risk due to:  severe exacerbation of chronic illness and illness with threat to life or bodily function.    Plan of care and goals reviewed during interdisciplinary rounds.  I discussed the patient's findings and my recommendations with family and nursing staff      Radha Wetzel, DO    Intensive Care Medicine and Pulmonary Medicine

## 2024-03-18 NOTE — PROGRESS NOTES
"   LOS: 8 days    Patient Care Team:  Deann Cao MD as PCP - General (Internal Medicine)    Consulted for: Acute on chronic renal failure, hyperkalemia  68-year-old CKD stage III admitted with shortness of breath, choking of food at NH, admitted with JAYY, hyperkalemia, acute respiratory failure now intubated, anemia.  Later developed oliguria with increasing BUN/creatinine.  Patient was treated with Lokelma, bicarb, dextrose, insulin, calcium gluconate.    Subjective     Seen and examined at bedside, patient is intubated and sedated.       Review of Systems:    Review of systems could not be obtained due to  patient intubated. emergent nature of case.    Objective     Vital Sign Min/Max for last 24 hours  Temp  Min: 98.8 °F (37.1 °C)  Max: 100.2 °F (37.9 °C)   BP  Min: 125/59  Max: 161/68   Pulse  Min: 64  Max: 76   Resp  Min: 18  Max: 19   SpO2  Min: 88 %  Max: 98 %   No data recorded   No data recorded     Flowsheet Rows      Flowsheet Row First Filed Value   Admission Height 151.9 cm (59.8\") Documented at 03/10/2024 0910   Admission Weight 84.8 kg (187 lb) Documented at 03/10/2024 0910            I/O this shift:  In: 811.6 [I.V.:398.6; Other:170; NG/GT:243]  Out: 325 [Urine:325]  I/O last 3 completed shifts:  In: 4172.2 [I.V.:2531.7; Other:401; NG/GT:961; IV Piggyback:278.5]  Out: 2365 [Urine:2365]    Physical Exam:  General Appearance: AA male critically ill remain on ventilator  Neck: Restricted due to intubation no JVD  Lungs: Ventilator sound heard, equal chest movement, nonlabored.  Heart: No gallop, murmur, rub, RRR.  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    : + Story catheter in place.     WBC WBC   Date Value Ref Range Status   03/18/2024 9.80 3.40 - 10.80 10*3/mm3 Final   03/17/2024 9.90 3.40 - 10.80 10*3/mm3 Final      HGB Hemoglobin   Date Value " "Ref Range Status   03/18/2024 8.6 (L) 13.0 - 17.7 g/dL Final   03/17/2024 8.7 (L) 13.0 - 17.7 g/dL Final      HCT Hematocrit   Date Value Ref Range Status   03/18/2024 28.5 (L) 37.5 - 51.0 % Final   03/17/2024 28.1 (L) 37.5 - 51.0 % Final      Platlets No results found for: \"LABPLAT\"   MCV MCV   Date Value Ref Range Status   03/18/2024 91.3 79.0 - 97.0 fL Final   03/17/2024 89.2 79.0 - 97.0 fL Final          Sodium Sodium   Date Value Ref Range Status   03/18/2024 149 (H) 136 - 145 mmol/L Final   03/17/2024 150 (H) 136 - 145 mmol/L Final   03/16/2024 150 (H) 136 - 145 mmol/L Final      Potassium Potassium   Date Value Ref Range Status   03/18/2024 3.6 3.5 - 5.2 mmol/L Final   03/17/2024 3.9 3.5 - 5.2 mmol/L Final   03/16/2024 4.1 3.5 - 5.2 mmol/L Final   03/15/2024 4.0 3.5 - 5.2 mmol/L Final      Chloride Chloride   Date Value Ref Range Status   03/18/2024 116 (H) 98 - 107 mmol/L Final   03/17/2024 113 (H) 98 - 107 mmol/L Final   03/16/2024 112 (H) 98 - 107 mmol/L Final      CO2 CO2   Date Value Ref Range Status   03/18/2024 23.0 22.0 - 29.0 mmol/L Final   03/17/2024 25.0 22.0 - 29.0 mmol/L Final   03/16/2024 23.0 22.0 - 29.0 mmol/L Final      BUN BUN   Date Value Ref Range Status   03/18/2024 48 (H) 8 - 23 mg/dL Final   03/17/2024 46 (H) 8 - 23 mg/dL Final   03/16/2024 39 (H) 8 - 23 mg/dL Final      Creatinine Creatinine   Date Value Ref Range Status   03/18/2024 1.71 (H) 0.76 - 1.27 mg/dL Final   03/17/2024 1.83 (H) 0.76 - 1.27 mg/dL Final   03/16/2024 2.06 (H) 0.76 - 1.27 mg/dL Final      Calcium Calcium   Date Value Ref Range Status   03/18/2024 8.3 (L) 8.6 - 10.5 mg/dL Final   03/17/2024 8.3 (L) 8.6 - 10.5 mg/dL Final   03/16/2024 8.7 8.6 - 10.5 mg/dL Final      PO4 No results found for: \"CAPO4\"   Albumin No results found for: \"ALBUMIN\"     Magnesium Magnesium   Date Value Ref Range Status   03/18/2024 2.2 1.6 - 2.4 mg/dL Final   03/17/2024 2.0 1.6 - 2.4 mg/dL Final        Uric Acid No results found for: " "\"URICACID\"        Results Review:     I reviewed the patient's new clinical results.    albuterol, 2.5 mg, Nebulization, Q6H - RT  amLODIPine, 10 mg, Nasogastric, Q24H  ARIPiprazole, 5 mg, Nasogastric, Daily  artificial tears, , Both Eyes, Q4H  brimonidine, 1 drop, Both Eyes, TID   And  timolol, 1 drop, Both Eyes, BID  carvedilol, 12.5 mg, Nasogastric, Q12H  chlorhexidine, 15 mL, Mouth/Throat, Q12H  cloNIDine, 0.2 mg, Oral, Q8H  sennosides, 10 mL, Nasogastric, BID   And  docusate sodium, 100 mg, Nasogastric, BID  ertapenem, 1,000 mg, Intravenous, Q24H  FLUoxetine, 20 mg, Nasogastric, BID  furosemide, 40 mg, Intravenous, Once  heparin (porcine), 5,000 Units, Subcutaneous, Q8H  hydrALAZINE, 100 mg, Nasogastric, Q8H  insulin regular, 2-9 Units, Subcutaneous, Q6H  oxyCODONE, 10 mg, Nasogastric, Q6H  pantoprazole, 40 mg, Intravenous, Q12H  polyethylene glycol, 17 g, Nasogastric, BID  QUEtiapine, 12.5 mg, Nasogastric, Daily  QUEtiapine, 25 mg, Nasogastric, Nightly  sodium chloride, 10 mL, Intravenous, Q12H  terazosin, 5 mg, Nasogastric, Q12H      dexmedetomidine, 0.2-1.5 mcg/kg/hr (Order-Specific), Last Rate: 1.2 mcg/kg/hr (03/18/24 1032)  fentanyl 10 mcg/mL,  mcg/hr, Last Rate: 250 mcg/hr (03/18/24 1238)        Medication Review: Reviewed    Assessment & Plan       Acute respiratory failure with hypoxemia    Essential hypertension    Neurocognitive disorder    AMS (altered mental status)    Dementia    Dysphagia    Iron deficiency anemia    Protein calorie malnutrition    Type 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  Acute renal failure on CKD stage III acute rise in creatinine with recent event.  Decreased renal perfusion, hypoxia, IV contrast.  2.  Hyperkalemia multifactorial due to JAYY, respiratory failure, acidosis.  3.  CKD stage III recent creatinine 1.68.  CT abdomen showed normal-sized " kidneys with no stones or hydronephrosis.ultrasound revealed right kidney 12.1 cm, left kidney 11.3 cm without hydronephrosis.   4.  Respite failure on ventilator CT angio negative for pulmonary embolism  5.  Essential hypertension  6.  Altered mental status  7.  Bradycardia  8.  Dementia     Recommendations:  - Renal function stable will give 1 dose of Lasix 40 mg IV push .   - Hypernatremia is noted.  Continue free water.   - Keep MAP greater than 65 mmHg.  - Avoid nephrotoxic medications.  - Adjust medication according to new GFR.    High risk complex patient multiple medical problems.      Tomi Gu MD  03/18/24  14:07 EDT

## 2024-03-18 NOTE — CASE MANAGEMENT/SOCIAL WORK
Continued Stay Note  [facility]     Patient Name: Omkar Nava  MRN: [id number]  Today's Date: 3/18/2024    Admit Date: 3/10/2024    Plan: Ongoing   Discharge Plan       Row Name 03/18/24 3100       Plan    Plan Ongoing    Plan Comments Discussed patient in MDR.  Patient remains sedated and on mechanical ventilation; weaning as tolerated.  Discharge plan is to return to [facility] bed if able to wean off ventilator.  CM will continue to follow.                   Discharge Codes    No documentation.                         Daisy Kelly RN

## 2024-03-19 ENCOUNTER — APPOINTMENT (OUTPATIENT)
Dept: GENERAL RADIOLOGY | Facility: HOSPITAL | Age: 69
End: 2024-03-19
Payer: MEDICARE

## 2024-03-19 LAB
ANION GAP SERPL CALCULATED.3IONS-SCNC: 9 MMOL/L (ref 5–15)
ARTERIAL PATENCY WRIST A: ABNORMAL
ATMOSPHERIC PRESS: ABNORMAL MM[HG]
BASE EXCESS BLDA CALC-SCNC: 0.9 MMOL/L (ref 0–2)
BDY SITE: ABNORMAL
BLASTOMYCES AG FLD IA-MCNC: NORMAL NG/ML
BODY TEMPERATURE: 37
BUN SERPL-MCNC: 50 MG/DL (ref 8–23)
BUN/CREAT SERPL: 27.5 (ref 7–25)
CALCIUM SPEC-SCNC: 8.3 MG/DL (ref 8.6–10.5)
CHLORIDE SERPL-SCNC: 115 MMOL/L (ref 98–107)
CO2 BLDA-SCNC: 28 MMOL/L (ref 22–33)
CO2 SERPL-SCNC: 25 MMOL/L (ref 22–29)
COHGB MFR BLD: 1.3 % (ref 0–2)
CREAT SERPL-MCNC: 1.82 MG/DL (ref 0.76–1.27)
DEPRECATED RDW RBC AUTO: 56 FL (ref 37–54)
EGFRCR SERPLBLD CKD-EPI 2021: 40.5 ML/MIN/1.73
EPAP: 0
ERYTHROCYTE [DISTWIDTH] IN BLOOD BY AUTOMATED COUNT: 16.6 % (ref 12.3–15.4)
GLUCOSE BLDC GLUCOMTR-MCNC: 207 MG/DL (ref 70–130)
GLUCOSE BLDC GLUCOMTR-MCNC: 217 MG/DL (ref 70–130)
GLUCOSE BLDC GLUCOMTR-MCNC: 220 MG/DL (ref 70–130)
GLUCOSE BLDC GLUCOMTR-MCNC: 258 MG/DL (ref 70–130)
GLUCOSE BLDC GLUCOMTR-MCNC: 261 MG/DL (ref 70–130)
GLUCOSE SERPL-MCNC: 201 MG/DL (ref 65–99)
HCO3 BLDA-SCNC: 26.6 MMOL/L (ref 20–26)
HCT VFR BLD AUTO: 29.1 % (ref 37.5–51)
HCT VFR BLD CALC: 26.8 % (ref 38–51)
HGB BLD-MCNC: 8.8 G/DL (ref 13–17.7)
HGB BLDA-MCNC: 8.7 G/DL (ref 13.5–17.5)
IMP & REVIEW OF LAB RESULTS: NEGATIVE
INHALED O2 CONCENTRATION: 45 %
IPAP: 0
MAGNESIUM SERPL-MCNC: 2.1 MG/DL (ref 1.6–2.4)
MCH RBC QN AUTO: 27.5 PG (ref 26.6–33)
MCHC RBC AUTO-ENTMCNC: 30.2 G/DL (ref 31.5–35.7)
MCV RBC AUTO: 90.9 FL (ref 79–97)
METHGB BLD QL: 0.3 % (ref 0–1.5)
MODALITY: ABNORMAL
OXYHGB MFR BLDV: 89.6 % (ref 94–99)
PAW @ PEAK INSP FLOW SETTING VENT: 0 CMH2O
PCO2 BLDA: 46.8 MM HG (ref 35–45)
PCO2 TEMP ADJ BLD: 46.8 MM HG (ref 35–48)
PEEP RESPIRATORY: 5 CM[H2O]
PH BLDA: 7.36 PH UNITS (ref 7.35–7.45)
PH, TEMP CORRECTED: 7.36 PH UNITS
PHOSPHATE SERPL-MCNC: 2.4 MG/DL (ref 2.5–4.5)
PLATELET # BLD AUTO: 213 10*3/MM3 (ref 140–450)
PMV BLD AUTO: 12 FL (ref 6–12)
PO2 BLDA: 61.2 MM HG (ref 83–108)
PO2 TEMP ADJ BLD: 61.2 MM HG (ref 83–108)
POTASSIUM SERPL-SCNC: 4 MMOL/L (ref 3.5–5.2)
RBC # BLD AUTO: 3.2 10*6/MM3 (ref 4.14–5.8)
SODIUM SERPL-SCNC: 149 MMOL/L (ref 136–145)
SPECIMEN SOURCE: NORMAL
TOTAL RATE: 0 BREATHS/MINUTE
VENTILATOR MODE: ABNORMAL
VT ON VENT VENT: 0.42 ML
WBC NRBC COR # BLD AUTO: 12.32 10*3/MM3 (ref 3.4–10.8)

## 2024-03-19 PROCEDURE — 25010000002 FENTANYL CITRATE (PF) 2500 MCG/50ML SOLUTION: Performed by: INTERNAL MEDICINE

## 2024-03-19 PROCEDURE — 82805 BLOOD GASES W/O2 SATURATION: CPT

## 2024-03-19 PROCEDURE — 99233 SBSQ HOSP IP/OBS HIGH 50: CPT | Performed by: INTERNAL MEDICINE

## 2024-03-19 PROCEDURE — 84100 ASSAY OF PHOSPHORUS: CPT | Performed by: INTERNAL MEDICINE

## 2024-03-19 PROCEDURE — 82948 REAGENT STRIP/BLOOD GLUCOSE: CPT

## 2024-03-19 PROCEDURE — 25010000002 ERTAPENEM PER 500 MG

## 2024-03-19 PROCEDURE — 94761 N-INVAS EAR/PLS OXIMETRY MLT: CPT

## 2024-03-19 PROCEDURE — 63710000001 INSULIN REGULAR HUMAN PER 5 UNITS: Performed by: INTERNAL MEDICINE

## 2024-03-19 PROCEDURE — 85027 COMPLETE CBC AUTOMATED: CPT | Performed by: INTERNAL MEDICINE

## 2024-03-19 PROCEDURE — 71045 X-RAY EXAM CHEST 1 VIEW: CPT

## 2024-03-19 PROCEDURE — 25010000002 HEPARIN (PORCINE) PER 1000 UNITS: Performed by: INTERNAL MEDICINE

## 2024-03-19 PROCEDURE — 82375 ASSAY CARBOXYHB QUANT: CPT

## 2024-03-19 PROCEDURE — 25810000003 SODIUM CHLORIDE 0.9 % SOLUTION: Performed by: INTERNAL MEDICINE

## 2024-03-19 PROCEDURE — 94799 UNLISTED PULMONARY SVC/PX: CPT

## 2024-03-19 PROCEDURE — 80048 BASIC METABOLIC PNL TOTAL CA: CPT | Performed by: INTERNAL MEDICINE

## 2024-03-19 PROCEDURE — 83735 ASSAY OF MAGNESIUM: CPT | Performed by: INTERNAL MEDICINE

## 2024-03-19 PROCEDURE — 36600 WITHDRAWAL OF ARTERIAL BLOOD: CPT

## 2024-03-19 PROCEDURE — 94003 VENT MGMT INPAT SUBQ DAY: CPT

## 2024-03-19 PROCEDURE — 83050 HGB METHEMOGLOBIN QUAN: CPT

## 2024-03-19 PROCEDURE — 25010000002 CHLOROTHIAZIDE PER 500 MG: Performed by: STUDENT IN AN ORGANIZED HEALTH CARE EDUCATION/TRAINING PROGRAM

## 2024-03-19 PROCEDURE — 94664 DEMO&/EVAL PT USE INHALER: CPT

## 2024-03-19 RX ORDER — WATER 10 ML/10ML
INJECTION INTRAMUSCULAR; INTRAVENOUS; SUBCUTANEOUS
Status: COMPLETED
Start: 2024-03-19 | End: 2024-03-19

## 2024-03-19 RX ADMIN — DEXMEDETOMIDINE HYDROCHLORIDE 0.6 MCG/KG/HR: 400 INJECTION, SOLUTION INTRAVENOUS at 06:30

## 2024-03-19 RX ADMIN — HEPARIN SODIUM 5000 UNITS: 5000 INJECTION INTRAVENOUS; SUBCUTANEOUS at 05:31

## 2024-03-19 RX ADMIN — HEPARIN SODIUM 5000 UNITS: 5000 INJECTION INTRAVENOUS; SUBCUTANEOUS at 22:00

## 2024-03-19 RX ADMIN — BRIMONIDINE TARTRATE 1 DROP: 2 SOLUTION/ DROPS OPHTHALMIC at 17:23

## 2024-03-19 RX ADMIN — ALBUTEROL SULFATE 2.5 MG: 2.5 SOLUTION RESPIRATORY (INHALATION) at 00:52

## 2024-03-19 RX ADMIN — WHITE PETROLATUM 57.7 %-MINERAL OIL 31.9 % EYE OINTMENT: at 15:33

## 2024-03-19 RX ADMIN — TIMOLOL MALEATE 1 DROP: 5 SOLUTION/ DROPS OPHTHALMIC at 20:07

## 2024-03-19 RX ADMIN — HYDRALAZINE HYDROCHLORIDE 100 MG: 50 TABLET ORAL at 15:21

## 2024-03-19 RX ADMIN — BRIMONIDINE TARTRATE 1 DROP: 2 SOLUTION/ DROPS OPHTHALMIC at 09:06

## 2024-03-19 RX ADMIN — TIMOLOL MALEATE 1 DROP: 5 SOLUTION/ DROPS OPHTHALMIC at 09:06

## 2024-03-19 RX ADMIN — CLONIDINE HYDROCHLORIDE 0.2 MG: 0.2 TABLET ORAL at 15:20

## 2024-03-19 RX ADMIN — CLONIDINE HYDROCHLORIDE 0.2 MG: 0.2 TABLET ORAL at 22:01

## 2024-03-19 RX ADMIN — HUMAN INSULIN 4 UNITS: 100 INJECTION, SOLUTION SUBCUTANEOUS at 05:31

## 2024-03-19 RX ADMIN — CHLOROTHIAZIDE SODIUM 250 MG: 500 INJECTION, POWDER, LYOPHILIZED, FOR SOLUTION INTRAVENOUS at 14:29

## 2024-03-19 RX ADMIN — OXYCODONE HYDROCHLORIDE 10 MG: 10 TABLET ORAL at 05:31

## 2024-03-19 RX ADMIN — FLUOXETINE 20 MG: 20 CAPSULE ORAL at 08:48

## 2024-03-19 RX ADMIN — BRIMONIDINE TARTRATE 1 DROP: 2 SOLUTION/ DROPS OPHTHALMIC at 20:07

## 2024-03-19 RX ADMIN — 0.12% CHLORHEXIDINE GLUCONATE 15 ML: 1.2 RINSE ORAL at 20:07

## 2024-03-19 RX ADMIN — HYDRALAZINE HYDROCHLORIDE 100 MG: 50 TABLET ORAL at 05:31

## 2024-03-19 RX ADMIN — HUMAN INSULIN 6 UNITS: 100 INJECTION, SOLUTION SUBCUTANEOUS at 18:35

## 2024-03-19 RX ADMIN — WHITE PETROLATUM 57.7 %-MINERAL OIL 31.9 % EYE OINTMENT: at 10:38

## 2024-03-19 RX ADMIN — DEXMEDETOMIDINE HYDROCHLORIDE 0.4 MCG/KG/HR: 400 INJECTION, SOLUTION INTRAVENOUS at 15:31

## 2024-03-19 RX ADMIN — TERAZOSIN HYDROCHLORIDE ANHYDROUS 5 MG: 5 CAPSULE ORAL at 08:48

## 2024-03-19 RX ADMIN — HYDRALAZINE HYDROCHLORIDE 100 MG: 50 TABLET ORAL at 22:01

## 2024-03-19 RX ADMIN — FENTANYL CITRATE 50 MCG/HR: 50 INJECTION, SOLUTION INTRAMUSCULAR; INTRAVENOUS at 20:16

## 2024-03-19 RX ADMIN — FLUOXETINE 20 MG: 20 CAPSULE ORAL at 20:07

## 2024-03-19 RX ADMIN — OXYCODONE HYDROCHLORIDE 10 MG: 10 TABLET ORAL at 12:41

## 2024-03-19 RX ADMIN — ALBUTEROL SULFATE 2.5 MG: 2.5 SOLUTION RESPIRATORY (INHALATION) at 07:47

## 2024-03-19 RX ADMIN — HUMAN INSULIN 4 UNITS: 100 INJECTION, SOLUTION SUBCUTANEOUS at 12:41

## 2024-03-19 RX ADMIN — WATER: 1 INJECTION INTRAMUSCULAR; INTRAVENOUS; SUBCUTANEOUS at 15:33

## 2024-03-19 RX ADMIN — PANTOPRAZOLE SODIUM 40 MG: 40 INJECTION, POWDER, FOR SOLUTION INTRAVENOUS at 08:48

## 2024-03-19 RX ADMIN — 0.12% CHLORHEXIDINE GLUCONATE 15 ML: 1.2 RINSE ORAL at 08:48

## 2024-03-19 RX ADMIN — DEXMEDETOMIDINE HYDROCHLORIDE 1 MCG/KG/HR: 400 INJECTION, SOLUTION INTRAVENOUS at 21:28

## 2024-03-19 RX ADMIN — ALBUTEROL SULFATE 2.5 MG: 2.5 SOLUTION RESPIRATORY (INHALATION) at 19:19

## 2024-03-19 RX ADMIN — ARIPIPRAZOLE 5 MG: 10 TABLET ORAL at 08:50

## 2024-03-19 RX ADMIN — WHITE PETROLATUM 57.7 %-MINERAL OIL 31.9 % EYE OINTMENT: at 22:01

## 2024-03-19 RX ADMIN — QUETIAPINE FUMARATE 12.5 MG: 25 TABLET ORAL at 08:49

## 2024-03-19 RX ADMIN — Medication 10 ML: at 20:08

## 2024-03-19 RX ADMIN — CARVEDILOL 12.5 MG: 12.5 TABLET, FILM COATED ORAL at 20:07

## 2024-03-19 RX ADMIN — QUETIAPINE FUMARATE 25 MG: 25 TABLET ORAL at 20:07

## 2024-03-19 RX ADMIN — Medication 10 ML: at 09:07

## 2024-03-19 RX ADMIN — HEPARIN SODIUM 5000 UNITS: 5000 INJECTION INTRAVENOUS; SUBCUTANEOUS at 15:23

## 2024-03-19 RX ADMIN — CARVEDILOL 12.5 MG: 12.5 TABLET, FILM COATED ORAL at 08:49

## 2024-03-19 RX ADMIN — AMLODIPINE BESYLATE 10 MG: 10 TABLET ORAL at 08:49

## 2024-03-19 RX ADMIN — WHITE PETROLATUM 57.7 %-MINERAL OIL 31.9 % EYE OINTMENT: at 05:32

## 2024-03-19 RX ADMIN — ALBUTEROL SULFATE 2.5 MG: 2.5 SOLUTION RESPIRATORY (INHALATION) at 13:58

## 2024-03-19 RX ADMIN — ERTAPENEM 1000 MG: 1 INJECTION INTRAMUSCULAR; INTRAVENOUS at 17:17

## 2024-03-19 RX ADMIN — WHITE PETROLATUM 57.7 %-MINERAL OIL 31.9 % EYE OINTMENT: at 01:46

## 2024-03-19 RX ADMIN — OXYCODONE HYDROCHLORIDE 10 MG: 10 TABLET ORAL at 17:17

## 2024-03-19 RX ADMIN — CLONIDINE HYDROCHLORIDE 0.2 MG: 0.2 TABLET ORAL at 05:31

## 2024-03-19 RX ADMIN — TERAZOSIN HYDROCHLORIDE ANHYDROUS 5 MG: 5 CAPSULE ORAL at 20:07

## 2024-03-19 RX ADMIN — PANTOPRAZOLE SODIUM 40 MG: 40 INJECTION, POWDER, FOR SOLUTION INTRAVENOUS at 20:07

## 2024-03-19 NOTE — PROGRESS NOTES
"   LOS: 9 days    Patient Care Team:  Deann Cao MD as PCP - General (Internal Medicine)    Consulted for: Acute on chronic renal failure, hyperkalemia  68-year-old CKD stage III admitted with shortness of breath, choking of food at NH, admitted with JAYY, hyperkalemia, acute respiratory failure now intubated, anemia.  Later developed oliguria with increasing BUN/creatinine.  Patient was treated with Lokelma, bicarb, dextrose, insulin, calcium gluconate.    Subjective     Intubated and sedated.   Not on pressors.   S/p Lasix yesterday.       Review of Systems:    Review of systems could not be obtained due to  patient intubated. emergent nature of case.    Objective     Vital Sign Min/Max for last 24 hours  Temp  Min: 97.9 °F (36.6 °C)  Max: 99.9 °F (37.7 °C)   BP  Min: 111/54  Max: 135/65   Pulse  Min: 64  Max: 74   Resp  Min: 18  Max: 18   SpO2  Min: 88 %  Max: 96 %   No data recorded   No data recorded     Flowsheet Rows      Flowsheet Row First Filed Value   Admission Height 151.9 cm (59.8\") Documented at 03/10/2024 0910   Admission Weight 84.8 kg (187 lb) Documented at 03/10/2024 0910            I/O this shift:  In: 848.6 [I.V.:201.6; Other:222; NG/GT:425]  Out: 192 [Urine:192]  I/O last 3 completed shifts:  In: 4237.3 [I.V.:1738.9; Other:763; NG/GT:1707; IV Piggyback:28.4]  Out: 2550 [Urine:2550]    Physical Exam:  General Appearance: AA male critically ill remain on ventilator  Neck: Restricted due to intubation no JVD  Lungs: Ventilator sound heard, equal chest movement, nonlabored.  Heart: No gallop, murmur, rub, RRR.  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    : + Story catheter in place.     WBC WBC   Date Value Ref Range Status   03/19/2024 12.32 (H) 3.40 - 10.80 10*3/mm3 Final   03/18/2024 9.80 3.40 - 10.80 10*3/mm3 Final   03/17/2024 9.90 3.40 - " "10.80 10*3/mm3 Final      HGB Hemoglobin   Date Value Ref Range Status   03/19/2024 8.8 (L) 13.0 - 17.7 g/dL Final   03/18/2024 8.6 (L) 13.0 - 17.7 g/dL Final   03/17/2024 8.7 (L) 13.0 - 17.7 g/dL Final      HCT Hematocrit   Date Value Ref Range Status   03/19/2024 29.1 (L) 37.5 - 51.0 % Final   03/18/2024 28.5 (L) 37.5 - 51.0 % Final   03/17/2024 28.1 (L) 37.5 - 51.0 % Final      Platlets No results found for: \"LABPLAT\"   MCV MCV   Date Value Ref Range Status   03/19/2024 90.9 79.0 - 97.0 fL Final   03/18/2024 91.3 79.0 - 97.0 fL Final   03/17/2024 89.2 79.0 - 97.0 fL Final          Sodium Sodium   Date Value Ref Range Status   03/19/2024 149 (H) 136 - 145 mmol/L Final   03/18/2024 149 (H) 136 - 145 mmol/L Final   03/17/2024 150 (H) 136 - 145 mmol/L Final      Potassium Potassium   Date Value Ref Range Status   03/19/2024 4.0 3.5 - 5.2 mmol/L Final   03/18/2024 3.6 3.5 - 5.2 mmol/L Final   03/17/2024 3.9 3.5 - 5.2 mmol/L Final      Chloride Chloride   Date Value Ref Range Status   03/19/2024 115 (H) 98 - 107 mmol/L Final   03/18/2024 116 (H) 98 - 107 mmol/L Final   03/17/2024 113 (H) 98 - 107 mmol/L Final      CO2 CO2   Date Value Ref Range Status   03/19/2024 25.0 22.0 - 29.0 mmol/L Final   03/18/2024 23.0 22.0 - 29.0 mmol/L Final   03/17/2024 25.0 22.0 - 29.0 mmol/L Final      BUN BUN   Date Value Ref Range Status   03/19/2024 50 (H) 8 - 23 mg/dL Final   03/18/2024 48 (H) 8 - 23 mg/dL Final   03/17/2024 46 (H) 8 - 23 mg/dL Final      Creatinine Creatinine   Date Value Ref Range Status   03/19/2024 1.82 (H) 0.76 - 1.27 mg/dL Final   03/18/2024 1.71 (H) 0.76 - 1.27 mg/dL Final   03/17/2024 1.83 (H) 0.76 - 1.27 mg/dL Final      Calcium Calcium   Date Value Ref Range Status   03/19/2024 8.3 (L) 8.6 - 10.5 mg/dL Final   03/18/2024 8.3 (L) 8.6 - 10.5 mg/dL Final   03/17/2024 8.3 (L) 8.6 - 10.5 mg/dL Final      PO4 No results found for: \"CAPO4\"   Albumin No results found for: \"ALBUMIN\"     Magnesium Magnesium   Date " "Value Ref Range Status   03/19/2024 2.1 1.6 - 2.4 mg/dL Final   03/18/2024 2.2 1.6 - 2.4 mg/dL Final   03/17/2024 2.0 1.6 - 2.4 mg/dL Final        Uric Acid No results found for: \"URICACID\"        Results Review:     I reviewed the patient's new clinical results.    albuterol, 2.5 mg, Nebulization, Q6H - RT  amLODIPine, 10 mg, Nasogastric, Q24H  ARIPiprazole, 5 mg, Nasogastric, Daily  artificial tears, , Both Eyes, Q4H  brimonidine, 1 drop, Both Eyes, TID   And  timolol, 1 drop, Both Eyes, BID  carvedilol, 12.5 mg, Nasogastric, Q12H  chlorhexidine, 15 mL, Mouth/Throat, Q12H  cloNIDine, 0.2 mg, Nasogastric, Q8H  sennosides, 10 mL, Nasogastric, BID   And  docusate sodium, 100 mg, Nasogastric, BID  ertapenem, 1,000 mg, Intravenous, Q24H  FLUoxetine, 20 mg, Nasogastric, BID  heparin (porcine), 5,000 Units, Subcutaneous, Q8H  hydrALAZINE, 100 mg, Nasogastric, Q8H  insulin regular, 2-9 Units, Subcutaneous, Q6H  oxyCODONE, 10 mg, Nasogastric, Q6H  pantoprazole, 40 mg, Intravenous, Q12H  polyethylene glycol, 17 g, Nasogastric, BID  QUEtiapine, 12.5 mg, Nasogastric, Daily  QUEtiapine, 25 mg, Nasogastric, Nightly  sodium chloride, 10 mL, Intravenous, Q12H  terazosin, 5 mg, Nasogastric, Q12H      dexmedetomidine, 0.2-1.5 mcg/kg/hr (Order-Specific), Last Rate: 0.4 mcg/kg/hr (03/19/24 1531)  fentanyl 10 mcg/mL,  mcg/hr, Last Rate: 50 mcg/hr (03/19/24 0910)        Medication Review: Reviewed    Assessment & Plan       Acute respiratory failure with hypoxemia    Essential hypertension    Neurocognitive disorder    AMS (altered mental status)    Dementia    Dysphagia    Iron deficiency anemia    Protein calorie malnutrition    Type 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  Acute renal failure on CKD stage III acute rise in creatinine with recent event.  Decreased renal perfusion, hypoxia, IV contrast.  2.  " Hyperkalemia multifactorial due to JAYY, respiratory failure, acidosis.  3.  CKD stage III recent creatinine 1.68.  CT abdomen showed normal-sized kidneys with no stones or hydronephrosis.ultrasound revealed right kidney 12.1 cm, left kidney 11.3 cm without hydronephrosis.   4.  Respite failure on ventilator CT angio negative for pulmonary embolism  5.  Essential hypertension  6.  Altered mental status  7.  Bradycardia  8.  Dementia     Recommendations:  - Renal function stable; creatinine 1.7-1.8 range.   - Hypernatremia is noted.  Continue free water. Will give a dose of diuril today.   - Keep MAP greater than 65 mmHg.  - Avoid nephrotoxic medications.  - Adjust medication according to new GFR.    High risk complex patient multiple medical problems.      Tomi Gu MD  03/19/24  17:47 EDT

## 2024-03-19 NOTE — PLAN OF CARE
Problem: Skin Injury Risk Increased  Goal: Skin Health and Integrity  Outcome: Ongoing, Progressing     Problem: Fall Injury Risk  Goal: Absence of Fall and Fall-Related Injury  Outcome: Ongoing, Progressing     Problem: Adult Inpatient Plan of Care  Goal: Plan of Care Review  Outcome: Ongoing, Progressing  Goal: Patient-Specific Goal (Individualized)  Outcome: Ongoing, Progressing  Goal: Absence of Hospital-Acquired Illness or Injury  Outcome: Ongoing, Progressing  Goal: Optimal Comfort and Wellbeing  Outcome: Ongoing, Progressing  Goal: Readiness for Transition of Care  Outcome: Ongoing, Progressing     Problem: Communication Impairment (Mechanical Ventilation, Invasive)  Goal: Effective Communication  Outcome: Ongoing, Progressing     Problem: Device-Related Complication Risk (Mechanical Ventilation, Invasive)  Goal: Optimal Device Function  Outcome: Ongoing, Progressing     Problem: Inability to Wean (Mechanical Ventilation, Invasive)  Goal: Mechanical Ventilation Liberation  Outcome: Ongoing, Progressing     Problem: Nutrition Impairment (Mechanical Ventilation, Invasive)  Goal: Optimal Nutrition Delivery  Outcome: Ongoing, Progressing     Problem: Skin and Tissue Injury (Mechanical Ventilation, Invasive)  Goal: Absence of Device-Related Skin and Tissue Injury  Outcome: Ongoing, Progressing     Problem: Ventilator-Induced Lung Injury (Mechanical Ventilation, Invasive)  Goal: Absence of Ventilator-Induced Lung Injury  Outcome: Ongoing, Progressing  Intervention: Prevent Ventilator-Associated Pneumonia  Recent Flowsheet Documentation  Taken 3/19/2024 1800 by Teresa Velázquez, RN  Oral Care: lip/mouth moisturizer applied  Taken 3/19/2024 1000 by Teresa Velázquez, RN  Oral Care: lip/mouth moisturizer applied     Problem: Adjustment to Illness (Sepsis/Septic Shock)  Goal: Optimal Coping  Outcome: Ongoing, Progressing     Problem: Bleeding (Sepsis/Septic Shock)  Goal: Absence of Bleeding  Outcome: Ongoing,  Progressing     Problem: Glycemic Control Impaired (Sepsis/Septic Shock)  Goal: Blood Glucose Level Within Desired Range  Outcome: Ongoing, Progressing     Problem: Infection Progression (Sepsis/Septic Shock)  Goal: Absence of Infection Signs and Symptoms  Outcome: Ongoing, Progressing     Problem: Nutrition Impaired (Sepsis/Septic Shock)  Goal: Optimal Nutrition Intake  Outcome: Ongoing, Progressing     Problem: Restraint, Nonviolent  Goal: Absence of Harm or Injury  Outcome: Ongoing, Progressing     Problem: Diabetes Comorbidity  Goal: Blood Glucose Level Within Targeted Range  Outcome: Ongoing, Progressing     Problem: Heart Failure Comorbidity  Goal: Maintenance of Heart Failure Symptom Control  Outcome: Ongoing, Progressing     Problem: Hypertension Comorbidity  Goal: Blood Pressure in Desired Range  Outcome: Ongoing, Progressing   Goal Outcome Evaluation:

## 2024-03-19 NOTE — PROGRESS NOTES
INTENSIVIST   PROGRESS NOTE     Hospital:  LOS: 9 days     Mr. Omkar Nava, 68 y.o. male is followed for a Chief Complaint of: Respiratory failure, pneumonia      Subjective   S     Interval History:  No acute events. Remains on mechanical ventilation. FiO2 at 45%.        The patient's relevant past medical, surgical and social history were reviewed and updated in Epic as appropriate.      ROS: Unable to obtain secondary to sedation and mechanical ventilation.     Objective   O     Vitals:  Temp  Min: 97.9 °F (36.6 °C)  Max: 99.9 °F (37.7 °C)  BP  Min: 111/54  Max: 135/65  Pulse  Min: 64  Max: 74  Resp  Min: 18  Max: 18  SpO2  Min: 88 %  Max: 96 % No data recorded    Intake/Ouptut 24 hrs (7:00AM - 6:59 AM)  Intake & Output (last 3 days)         03/15 0701  03/16 0700 03/16 0701  03/17 0700 03/17 0701  03/18 0700 03/18 0701 03/19 0700    I.V. (mL/kg) 1860.5 (20.4) 836.4 (8.5) 2531.7 (25.6) 398.6 (4)    Blood        Other 181 193 321 170    NG/ 637 722 243    IV Piggyback 52.7  278.5     Total Intake(mL/kg) 2652.2 (29.1) 1666.4 (16.9) 3853.2 (39) 811.6 (8.2)    Urine (mL/kg/hr) 3765 (1.7) 1815 (0.8) 1475 (0.6) 325 (0.4)    Stool   0     Total Output 3765 1815 1475 325    Net -1112.8 -148.6 +2378.2 +486.6            Stool Unmeasured Occurrence   3 x             Medications (drips):  dexmedetomidine, Last Rate: 0.4 mcg/kg/hr (03/19/24 1531)  fentanyl 10 mcg/mL, Last Rate: 50 mcg/hr (03/19/24 0910)        Mechanical Ventilator Settings:          Resp Rate (Set): 18     FiO2 (%): 50 %  PEEP/CPAP (cm H2O): 5 cm H20    Minute Ventilation (L/min) (Obs): 7.2 L/min  Resp Rate (Observed) Vent: 18  I:E Ratio (Set): 1:2.33  I:E Ratio (Obs): 1:2.3    PIP Observed (cm H2O): 34 cm H2O  Plateau Pressure (cm H2O): (S) 26 cm H2O    Physical Examination  Telemetry:  Normal sinus rhythm.    Constitutional:  No acute distress.  ETT in place on mechanical ventilation.    Eyes: No scleral icterus.   PERRL, EOM intact.    Neck:   Supple, FROM   Cardiovascular: Normal rate, regular and rhythm. Normal heart sounds.  No murmurs, gallop or rub.   Respiratory: No respiratory distress. Normal respiratory effort.  Diminished. No wheezing.    Abdominal:  Soft. No masses. Nontender. No distension. No HSM.   Extremities: No digital cyanosis. No clubbing.  No peripheral edema.   Skin: No rashes, lesions or ulcers   Neurological:   Sedated. Moves extremities to stimulation.              Results from last 7 days   Lab Units 03/19/24 0329 03/18/24 0322 03/17/24  0314   WBC 10*3/mm3 12.32* 9.80 9.90   HEMOGLOBIN g/dL 8.8* 8.6* 8.7*   MCV fL 90.9 91.3 89.2   PLATELETS 10*3/mm3 213 156 137*     Results from last 7 days   Lab Units 03/19/24 0329 03/18/24 0322 03/17/24 0314   SODIUM mmol/L 149* 149* 150*   POTASSIUM mmol/L 4.0 3.6 3.9   CO2 mmol/L 25.0 23.0 25.0   CREATININE mg/dL 1.82* 1.71* 1.83*   GLUCOSE mg/dL 201* 197* 134*   MAGNESIUM mg/dL 2.1 2.2 2.0   PHOSPHORUS mg/dL 2.4* 2.4* 3.0     Estimated Creatinine Clearance: 40.3 mL/min (A) (by C-G formula based on SCr of 1.82 mg/dL (H)).  Results from last 7 days   Lab Units 03/14/24  0358   ALK PHOS U/L 83   BILIRUBIN mg/dL 0.2   BILIRUBIN DIRECT mg/dL <0.2   ALT (SGPT) U/L 33   AST (SGOT) U/L 30       Results from last 7 days   Lab Units 03/19/24  0427 03/18/24  0426 03/17/24  0315 03/16/24  0447 03/15/24  0325   PH, ARTERIAL pH units 7.363 7.387 7.406 7.396 7.410   PCO2, ARTERIAL mm Hg 46.8* 44.2 44.6 45.7* 44.1   PO2 ART mm Hg 61.2* 58.7* 106.0 82.8* 87.1   FIO2 % 45 50 50 40 50       Images:  Imaging Results (Last 24 Hours)       Procedure Component Value Units Date/Time    XR Chest 1 View [[id number]] Collected: 03/19/24 0735     Updated: 03/19/24 0740    Narrative:      XR CHEST 1 VW    Date of Exam: 3/19/2024 1:35 AM EDT    Indication: to confirm placement of ET Tube -Manual Prone Protocol    Comparison: 3/18/2024.    Findings:  ET tube is in place with the tip several centimeters above the  sahra. NG tube is directed to the left upper quadrant although tip is not included. Left internal jugular line tip is in the lower SVC, unchanged. There are continued bilateral lung   infiltrates with effusions. There is stable mild cardiomegaly.      Impression:      Impression:  No significant change from prior study.      Electronically Signed: Jennifer Bo MD    3/19/2024 7:37 AM EDT    Workstation ID: [id number]               Results: Reviewed.  I reviewed the patient's new laboratory and imaging results.  I independently reviewed the patient's new images.    Medications: Reviewed.    Assessment & Plan   A / P     Mr. Nava is a 68-year-old gentleman with diabetes mellitus complicated by blindness, osteomyelitis, chronic kidney disease, underlying dementia with psychosis, dependent for mobility, bathing and dressing, chronic anemia, heart failure with normal EF who is now admitted for his third admission in the last 2.5 months for an aspiration event with aspiration pneumonia.  On his previous admission a modified barium swallow did reveal moderate oral and mild pharyngeal dysphagia February 5, 2024.  Respiratory status deteriorated overnight on March 10 and he required intubation.  He initially was requiring 35% oxygen over March 13, 14th oxygenation worsened and he required 80% FiO2.  CTA of the chest was repeated and was negative for PE but revealed worsening bilateral pneumonia.  He developed abdominal distention.  CTA of the abdomen revealed no bowel obstruction or ischemic bowel.       Respiratory cultures are growing ESBL Klebsiella and antibiotics were changed to Ertapenem on 3/16.     Nutrition:   NPO Diet NPO Type: Strict NPO  Advance Directives:   Code Status and Medical Interventions:   Ordered at: 03/10/24 1302     Code Status (Patient has no pulse and is not breathing):    CPR (Attempt to Resuscitate)     Medical Interventions (Patient has pulse or is breathing):    Full Support       Active  Hospital Problems    Diagnosis     **Acute respiratory failure with hypoxemia     Healthcare-associated pneumonia     Acute kidney injury superimposed on chronic kidney disease     AMS (altered mental status)     Dysphagia     Dementia     Protein calorie malnutrition     Neurocognitive disorder     Essential hypertension     Type 2 diabetes mellitus with diabetic chronic kidney disease     Iron deficiency anemia        Assessment / Plan:    Continue mechanical ventilation. Titrate FiO2 as tolerated.   Precedex and Fentanyl for sedation. Wean as tolerated.   Continue bowel regimen  Continue tube feeds.   Ertapenem for pneumonia.   Nephrology following for JAYY.   AM labs and CXR.     High risk secondary to management of mechanical ventilation.     High level of risk due to:  severe exacerbation of chronic illness and illness with threat to life or bodily function.    Plan of care and goals reviewed during interdisciplinary rounds.  I discussed the patient's findings and my recommendations with nursing staff      Radha Wetzel,     Intensive Care Medicine and Pulmonary Medicine

## 2024-03-19 NOTE — PROGRESS NOTES
Clinical Nutrition     Nutrition Support Assessment  Reason for Visit: Follow-up protocol, EN    Patient Name: Omkar Nava  YOB: 1955  MRN: [id number]  Date of Encounter: 03/19/24 13:57 EDT  Admission date: 3/10/2024    Tolerating EN, continue per order / as tolerated, water flushes increased to 30 ml/hr 2/2 hypernatremia.     Recommend consider Nephrovite, goal volume too low to meet vitamin/mineral needs.     Nutrition Assessment   Admission Diagnosis:  Healthcare-associated pneumonia [J18.9]  Acute respiratory failure with hypoxemia [J96.01]      Problem List:    Acute respiratory failure with hypoxemia    Essential hypertension    Neurocognitive disorder    AMS (altered mental status)    Dementia    Dysphagia    Iron deficiency anemia    Protein calorie malnutrition    Type 2 diabetes mellitus with diabetic chronic kidney disease    Healthcare-associated pneumonia    Acute kidney injury superimposed on chronic kidney disease        PMH:   He  has a past medical history of Anemia, Dementia, Diabetes mellitus, Dysphagia, GERD (gastroesophageal reflux disease), History of alcohol abuse, History of cocaine use, History of marijuana use, Hypertension, Osteomyelitis, Poor historian, and Visual impairment.    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      Applicable Nutrition Concerns:   Skin:  Oral: history of dysphagia - came in with chocking incident   GI: came with constipation; improved     Applicable Interval History:   (3/10) Intubated   (3/11) EN started - [facility] Renal     Reported/Observed/Food/Nutrition Related History:     3/19) Pt tolerating EN without issue. Remains intubated/sedated. Bowels moving. Renal fx remains poor, nephrology following and increased water flushes over the weekend. Still hypernatremic, will increase  further.     3/15) Pt with abd. distention yesterday and EN was held. Imagining showed mild excess fluid in bowel and stomach, stool burden. No BM yet, receiving senokot, colace, and miralax. Gastroenterology placed NJ in EGD yesterday. Discussed in MDR, Intensivist okay with restarting slow/low. Pt with hypoglycemia while EN was on hold and D20 infusion added, discussed plan to stop this as EN advances to goal. Renal fx labs slightly worsened today. O2 sats improved and no current plans for proning pt.      3/14) Remains intubated, off sedation.  Overall improved renal status.  Patient was taken to CT of abdomen this morning; not clear reason, no documentation of any GI issues.    Discussed with RN caring for patient today. Reports patient was starting to have abdominal distention yesterday, EN was turned off; turned back on at some point. She noted patient with increased distention this morning which was not the case yesterday.  CT of abdomen and KUB results noted. It seem patient is still with significant stool burden.  RN giving ordered bowel regiment now - not given yesterday per MAR records?     3/11) Pt presents in ARF, intubated/sedated. Intensivist consulted for EN and has already placed order for Nov[employer] which RD concurs with. Pt from NH and came to ED after choking incident. Pt with severe dementia and aggression. Advanced chronic diseases HF, CKD, DM, dysphagia. Pt is blind.   Pt has NG. Did have some emesis on POA, KUB showed stool burden, pt now has had several BMs. No further emesis. Pt with significantly high Potassium on POA, improved with several doses lokelma to 6.0 today. Renal fx labs poor, A/C renal failure per Nephrology. Pt was receiving propofol but this was stopped, plan to transition to precedex and fentanyl. Pt was also receiving NS @ 100 ml/hr which was d/c this am. No current vasopressor support.     Labs    Labs Reviewed: Yes     Results from last 7 days   Lab Units 03/19/24  0933  "03/18/24  0322 03/17/24  0314 03/15/24  0327 03/14/24  1015 03/14/24  0358   GLUCOSE mg/dL 201* 197* 134*   < >  --  63*   BUN mg/dL 50* 48* 46*   < >  --  35*   CREATININE mg/dL 1.82* 1.71* 1.83*   < >  --  1.69*   SODIUM mmol/L 149* 149* 150*   < >  --  143   CHLORIDE mmol/L 115* 116* 113*   < >  --  109*   POTASSIUM mmol/L 4.0 3.6 3.9   < >  --  3.6   PHOSPHORUS mg/dL 2.4* 2.4* 3.0   < >  --  3.3   MAGNESIUM mg/dL 2.1 2.2 2.0   < >  --   --    ALT (SGPT) U/L  --   --   --   --   --  33   LACTATE mmol/L  --   --   --   --  1.0  --     < > = values in this interval not displayed.       Results from last 7 days   Lab Units 03/14/24  0358 03/13/24  0351   ALBUMIN g/dL 3.2*  3.1* 3.3*       Results from last 7 days   Lab Units 03/19/24  1138 03/19/24  0516 03/18/24  2331 03/18/24  1807 03/18/24  1138 03/18/24  0540   GLUCOSE mg/dL 220* 217* 197* 207* 192* 204*     Lab Results   Lab Value Date/Time    HGBA1C 7.00 (H) 10/16/2022 0432    HGBA1C 8.7 (H) 06/25/2022 0242    HGBA1C 13.4 04/14/2022 1058                   Medications    Medications Reviewed: Yes  Pertinent: colace, levemir 5units Q12hrs, insulin 2 units Q6hrs, Protonix, Miralax, senokot  Infusion: precedex, fentanyl  PRN:     Intake/Ouptut 24 hrs (0701 - 0700)   I&O's Reviewed: Yes   Urine: 2586ml  Stool: No documented BM yesterday; lat BM 3/13    Anthropometrics     Height: Height: 151.9 cm (59.8\")  Last Filed Weight: Weight: 98.8 kg (217 lb 13 oz) (03/17/24 0600)  Method: Weight Method: Bed scale  BMI: BMI (Calculated): 42.8  BMI classification: Obese Class II: 35-39.9kg/m2  IBW:      UBW: fluctuates 2/2 CKD ~180-200 lb since 2022   Weight       Weight (kg) Weight (lbs) Weight Method Visit Report   8/9/2023 88.905 kg  196 lb  Bed scale     1/15/2024 93.441 kg  206 lb  Estimated     1/16/2024 90.855 kg  200 lb 4.8 oz  Bed scale      90 kg  198 lb 6.6 oz      1/17/2024 95.21 kg  209 lb 14.4 oz      1/18/2024 85.548 kg  188 lb 9.6 oz  Bed scale     1/19/2024 " "82.691 kg  182 lb 4.8 oz  Bed scale     2024 83.054 kg  183 lb 1.6 oz  Bed scale     2024 85.14 kg  187 lb 11.2 oz  Bed scale     2024 85.1 kg  187 lb 9.8 oz  Estimated     2024 84.823 kg  187 lb   --    3/10/2024 84.823 kg  187 lb  Stated      98.1 kg  216 lb 4.3 oz  Bed scale     3/11/2024 90 kg  198 lb 6.6 oz  Bed scale       Weight change: no significant changes    Nutrition Focused Physical Exam     Date: 3/11    Unable to perform exam due to: Pt unable to participate at time of visit, Defer pending indication    Needs Assessment   Date: 3/11, reassessed 3/19    Height used:Height: 151.9 cm (59.8\")  Weights used: 85 kg / 187 lb (ABW), 53 kg / 117 lb (IBW)  *suspected dry weight likely ~185 lb given previously documented including recent MDOV weight. Likely currently holding fluid, will continue to monitor.    Estimated Calorie needs: ~1600 Kcal/day while on vent  Method:  Kcals/KG 15-20 = 5893-2005  Method:  PSU (3/19 Tmax-37.5, Ve-7.15) = 1607    Estimated Protein needs: ~76 g PRO/day with current renal fx  Method: .8-1 g/Kg ABW = 68-85  Method: 2 g/Kg IBW = 106    Estimated Fluid needs: ~ ml/day   Per clinical status    Current Nutrition Prescription     PO: NPO Diet NPO Type: Strict NPO  Oral Nutrition Supplement:   Intake: N/A    EN: [facility] Renal  Goal Rate: 40ml/hr   Water Flushes: 20ml/hr  Modular: None  Route: NG  Tube: Large bore    At goal over: 20Hrs/day    Rx will supply:   Goal Volume 800  mL/day     Flush Volume 400 mL/day     Energy 1600 Kcal/day 100 % Est Need   Protein 73 g/day 96 % Est Need   Fiber 0 g/day     Water in   mL     Total Water 976 mL     Meet DRI No        --------------------------------------------------------------------------  Product/Rate verified at bedside: Yes on hold   Infusing Rate at time of visit:     Average Delivery from [facility] Day:  Volume 882 mL/day 110  % Goal Vol.   Flush Volume 542 mL/day     Energy  Kcal/day  % Est Need "   Protein  g/day  % Est Need   Fiber  g/day     Water in  EN  mL     Total Water  mL     Meet DRI No          Nutrition Diagnosis   Date: 3/11  Updated: 3/14  Problem Inadequate oral intake   Etiology ARF requiring mechanical ventilation   Signs/Symptoms Intubated/sedated, NPO   Status: EN meeting needs     Date: 3/11  Updated: 3/14  Problem Altered nutrition related laboratory values   Etiology A/C Renal Failure   Signs/Symptoms Creatinine 2.02, BUN 57, K 6.0   Status: Improving     Goal:   General: Nutrition to support treatment  PO: Advance diet as medically feasible/appropriate  EN/PN: Adjust EN, Deliver estimated needs    Nutrition Intervention      Follow treatment progress, Care plan reviewed    Continue EN per order/as tolerated, water flushes increased to 30 ml/hr to provide 600 ml flushes, 1176 total water    Monitoring/Evaluation:   Per protocol, I&O, Pertinent labs, EN delivery/tolerance, Weight, GI status, Symptoms, POC/GOC, Swallow function, Hemodynamic stability      Chuyita Moreno RD, CNSC  Time Spent: 35m

## 2024-03-20 ENCOUNTER — APPOINTMENT (OUTPATIENT)
Dept: GENERAL RADIOLOGY | Facility: HOSPITAL | Age: 69
End: 2024-03-20
Payer: MEDICARE

## 2024-03-20 LAB
ANION GAP SERPL CALCULATED.3IONS-SCNC: 7 MMOL/L (ref 5–15)
ARTERIAL PATENCY WRIST A: ABNORMAL
ATMOSPHERIC PRESS: ABNORMAL MM[HG]
BASE EXCESS BLDA CALC-SCNC: 2.3 MMOL/L (ref 0–2)
BDY SITE: ABNORMAL
BODY TEMPERATURE: 37
BUN SERPL-MCNC: 55 MG/DL (ref 8–23)
BUN/CREAT SERPL: 32.4 (ref 7–25)
CALCIUM SPEC-SCNC: 8.4 MG/DL (ref 8.6–10.5)
CHLORIDE SERPL-SCNC: 110 MMOL/L (ref 98–107)
CO2 BLDA-SCNC: 27.8 MMOL/L (ref 22–33)
CO2 SERPL-SCNC: 26 MMOL/L (ref 22–29)
COHGB MFR BLD: 1.2 % (ref 0–2)
CREAT SERPL-MCNC: 1.7 MG/DL (ref 0.76–1.27)
DEPRECATED RDW RBC AUTO: 54.6 FL (ref 37–54)
EGFRCR SERPLBLD CKD-EPI 2021: 43.9 ML/MIN/1.73
EPAP: 0
ERYTHROCYTE [DISTWIDTH] IN BLOOD BY AUTOMATED COUNT: 16.1 % (ref 12.3–15.4)
GLUCOSE BLDC GLUCOMTR-MCNC: 206 MG/DL (ref 70–130)
GLUCOSE BLDC GLUCOMTR-MCNC: 222 MG/DL (ref 70–130)
GLUCOSE BLDC GLUCOMTR-MCNC: 225 MG/DL (ref 70–130)
GLUCOSE BLDC GLUCOMTR-MCNC: 229 MG/DL (ref 70–130)
GLUCOSE SERPL-MCNC: 211 MG/DL (ref 65–99)
H CAPSUL AG UR QL IA: NEGATIVE
HCO3 BLDA-SCNC: 26.6 MMOL/L (ref 20–26)
HCT VFR BLD AUTO: 28.6 % (ref 37.5–51)
HCT VFR BLD CALC: 26.1 % (ref 38–51)
HGB BLD-MCNC: 8.7 G/DL (ref 13–17.7)
HGB BLDA-MCNC: 8.5 G/DL (ref 13.5–17.5)
INHALED O2 CONCENTRATION: 50 %
IPAP: 0
MAGNESIUM SERPL-MCNC: 2.4 MG/DL (ref 1.6–2.4)
MCH RBC QN AUTO: 27.8 PG (ref 26.6–33)
MCHC RBC AUTO-ENTMCNC: 30.4 G/DL (ref 31.5–35.7)
MCV RBC AUTO: 91.4 FL (ref 79–97)
METHGB BLD QL: 0.2 % (ref 0–1.5)
MODALITY: ABNORMAL
OXYHGB MFR BLDV: 95.4 % (ref 94–99)
PAW @ PEAK INSP FLOW SETTING VENT: 0 CMH2O
PCO2 BLDA: 39.1 MM HG (ref 35–45)
PCO2 TEMP ADJ BLD: 39.1 MM HG (ref 35–48)
PH BLDA: 7.44 PH UNITS (ref 7.35–7.45)
PH, TEMP CORRECTED: 7.44 PH UNITS
PHOSPHATE SERPL-MCNC: 2 MG/DL (ref 2.5–4.5)
PLATELET # BLD AUTO: 290 10*3/MM3 (ref 140–450)
PMV BLD AUTO: 12.1 FL (ref 6–12)
PO2 BLDA: 77.6 MM HG (ref 83–108)
PO2 TEMP ADJ BLD: 77.6 MM HG (ref 83–108)
POTASSIUM SERPL-SCNC: 3.5 MMOL/L (ref 3.5–5.2)
RBC # BLD AUTO: 3.13 10*6/MM3 (ref 4.14–5.8)
SODIUM SERPL-SCNC: 143 MMOL/L (ref 136–145)
TOTAL RATE: 0 BREATHS/MINUTE
WBC NRBC COR # BLD AUTO: 14.48 10*3/MM3 (ref 3.4–10.8)

## 2024-03-20 PROCEDURE — 82948 REAGENT STRIP/BLOOD GLUCOSE: CPT

## 2024-03-20 PROCEDURE — 82375 ASSAY CARBOXYHB QUANT: CPT

## 2024-03-20 PROCEDURE — 83735 ASSAY OF MAGNESIUM: CPT | Performed by: INTERNAL MEDICINE

## 2024-03-20 PROCEDURE — 99233 SBSQ HOSP IP/OBS HIGH 50: CPT | Performed by: INTERNAL MEDICINE

## 2024-03-20 PROCEDURE — 94799 UNLISTED PULMONARY SVC/PX: CPT

## 2024-03-20 PROCEDURE — 25010000002 HEPARIN (PORCINE) PER 1000 UNITS: Performed by: INTERNAL MEDICINE

## 2024-03-20 PROCEDURE — 94664 DEMO&/EVAL PT USE INHALER: CPT

## 2024-03-20 PROCEDURE — 82805 BLOOD GASES W/O2 SATURATION: CPT

## 2024-03-20 PROCEDURE — 63710000001 INSULIN REGULAR HUMAN PER 5 UNITS: Performed by: INTERNAL MEDICINE

## 2024-03-20 PROCEDURE — 25010000002 ERTAPENEM PER 500 MG

## 2024-03-20 PROCEDURE — 94003 VENT MGMT INPAT SUBQ DAY: CPT

## 2024-03-20 PROCEDURE — 36600 WITHDRAWAL OF ARTERIAL BLOOD: CPT

## 2024-03-20 PROCEDURE — 85027 COMPLETE CBC AUTOMATED: CPT | Performed by: INTERNAL MEDICINE

## 2024-03-20 PROCEDURE — 84100 ASSAY OF PHOSPHORUS: CPT | Performed by: INTERNAL MEDICINE

## 2024-03-20 PROCEDURE — 80048 BASIC METABOLIC PNL TOTAL CA: CPT | Performed by: INTERNAL MEDICINE

## 2024-03-20 PROCEDURE — 83050 HGB METHEMOGLOBIN QUAN: CPT

## 2024-03-20 PROCEDURE — 71045 X-RAY EXAM CHEST 1 VIEW: CPT

## 2024-03-20 RX ORDER — TORSEMIDE 10 MG/1
5 TABLET ORAL DAILY
Status: DISCONTINUED | OUTPATIENT
Start: 2024-03-20 | End: 2024-03-20

## 2024-03-20 RX ORDER — FLUOXETINE HYDROCHLORIDE 20 MG/1
20 CAPSULE ORAL EVERY 12 HOURS
Status: DISCONTINUED | OUTPATIENT
Start: 2024-03-20 | End: 2024-03-27

## 2024-03-20 RX ORDER — FLUOXETINE 20 MG/5ML
20 SOLUTION ORAL EVERY 12 HOURS SCHEDULED
Status: DISCONTINUED | OUTPATIENT
Start: 2024-03-20 | End: 2024-03-20 | Stop reason: SDUPTHER

## 2024-03-20 RX ORDER — TORSEMIDE 10 MG/1
5 TABLET ORAL DAILY
Status: DISCONTINUED | OUTPATIENT
Start: 2024-03-20 | End: 2024-04-04 | Stop reason: HOSPADM

## 2024-03-20 RX ADMIN — TERAZOSIN HYDROCHLORIDE ANHYDROUS 5 MG: 5 CAPSULE ORAL at 21:00

## 2024-03-20 RX ADMIN — HYDRALAZINE HYDROCHLORIDE 100 MG: 50 TABLET ORAL at 14:45

## 2024-03-20 RX ADMIN — ALBUTEROL SULFATE 2.5 MG: 2.5 SOLUTION RESPIRATORY (INHALATION) at 19:10

## 2024-03-20 RX ADMIN — OXYCODONE HYDROCHLORIDE 10 MG: 10 TABLET ORAL at 05:39

## 2024-03-20 RX ADMIN — DEXMEDETOMIDINE HYDROCHLORIDE 1 MCG/KG/HR: 400 INJECTION, SOLUTION INTRAVENOUS at 18:22

## 2024-03-20 RX ADMIN — QUETIAPINE FUMARATE 25 MG: 25 TABLET ORAL at 21:00

## 2024-03-20 RX ADMIN — TERAZOSIN HYDROCHLORIDE ANHYDROUS 5 MG: 5 CAPSULE ORAL at 08:24

## 2024-03-20 RX ADMIN — AMLODIPINE BESYLATE 10 MG: 10 TABLET ORAL at 08:24

## 2024-03-20 RX ADMIN — WHITE PETROLATUM 57.7 %-MINERAL OIL 31.9 % EYE OINTMENT: at 22:17

## 2024-03-20 RX ADMIN — 0.12% CHLORHEXIDINE GLUCONATE 15 ML: 1.2 RINSE ORAL at 21:05

## 2024-03-20 RX ADMIN — HYDRALAZINE HYDROCHLORIDE 100 MG: 50 TABLET ORAL at 05:39

## 2024-03-20 RX ADMIN — HUMAN INSULIN 4 UNITS: 100 INJECTION, SOLUTION SUBCUTANEOUS at 12:47

## 2024-03-20 RX ADMIN — TIMOLOL MALEATE 1 DROP: 5 SOLUTION/ DROPS OPHTHALMIC at 08:27

## 2024-03-20 RX ADMIN — DEXMEDETOMIDINE HYDROCHLORIDE 1 MCG/KG/HR: 400 INJECTION, SOLUTION INTRAVENOUS at 22:17

## 2024-03-20 RX ADMIN — WHITE PETROLATUM 57.7 %-MINERAL OIL 31.9 % EYE OINTMENT: at 14:46

## 2024-03-20 RX ADMIN — HEPARIN SODIUM 5000 UNITS: 5000 INJECTION INTRAVENOUS; SUBCUTANEOUS at 21:04

## 2024-03-20 RX ADMIN — HUMAN INSULIN 4 UNITS: 100 INJECTION, SOLUTION SUBCUTANEOUS at 18:22

## 2024-03-20 RX ADMIN — CLONIDINE HYDROCHLORIDE 0.2 MG: 0.2 TABLET ORAL at 05:39

## 2024-03-20 RX ADMIN — CLONIDINE HYDROCHLORIDE 0.2 MG: 0.2 TABLET ORAL at 22:18

## 2024-03-20 RX ADMIN — FLUOXETINE 20 MG: 20 CAPSULE ORAL at 22:18

## 2024-03-20 RX ADMIN — ALBUTEROL SULFATE 2.5 MG: 2.5 SOLUTION RESPIRATORY (INHALATION) at 13:27

## 2024-03-20 RX ADMIN — HUMAN INSULIN 6 UNITS: 100 INJECTION, SOLUTION SUBCUTANEOUS at 00:13

## 2024-03-20 RX ADMIN — BRIMONIDINE TARTRATE 1 DROP: 2 SOLUTION/ DROPS OPHTHALMIC at 20:59

## 2024-03-20 RX ADMIN — HYDRALAZINE HYDROCHLORIDE 100 MG: 50 TABLET ORAL at 21:05

## 2024-03-20 RX ADMIN — DEXMEDETOMIDINE HYDROCHLORIDE 1 MCG/KG/HR: 400 INJECTION, SOLUTION INTRAVENOUS at 10:28

## 2024-03-20 RX ADMIN — OXYCODONE HYDROCHLORIDE 10 MG: 10 TABLET ORAL at 00:13

## 2024-03-20 RX ADMIN — WHITE PETROLATUM 57.7 %-MINERAL OIL 31.9 % EYE OINTMENT: at 02:02

## 2024-03-20 RX ADMIN — ALBUTEROL SULFATE 2.5 MG: 2.5 SOLUTION RESPIRATORY (INHALATION) at 07:27

## 2024-03-20 RX ADMIN — BRIMONIDINE TARTRATE 1 DROP: 2 SOLUTION/ DROPS OPHTHALMIC at 17:06

## 2024-03-20 RX ADMIN — TIMOLOL MALEATE 1 DROP: 5 SOLUTION/ DROPS OPHTHALMIC at 20:59

## 2024-03-20 RX ADMIN — PANTOPRAZOLE SODIUM 40 MG: 40 INJECTION, POWDER, FOR SOLUTION INTRAVENOUS at 08:24

## 2024-03-20 RX ADMIN — HUMAN INSULIN 4 UNITS: 100 INJECTION, SOLUTION SUBCUTANEOUS at 05:39

## 2024-03-20 RX ADMIN — PANTOPRAZOLE SODIUM 40 MG: 40 INJECTION, POWDER, FOR SOLUTION INTRAVENOUS at 21:00

## 2024-03-20 RX ADMIN — DEXMEDETOMIDINE HYDROCHLORIDE 1 MCG/KG/HR: 400 INJECTION, SOLUTION INTRAVENOUS at 14:46

## 2024-03-20 RX ADMIN — POLYETHYLENE GLYCOL 3350 17 G: 17 POWDER, FOR SOLUTION ORAL at 22:17

## 2024-03-20 RX ADMIN — DEXMEDETOMIDINE HYDROCHLORIDE 1 MCG/KG/HR: 400 INJECTION, SOLUTION INTRAVENOUS at 02:04

## 2024-03-20 RX ADMIN — WHITE PETROLATUM 57.7 %-MINERAL OIL 31.9 % EYE OINTMENT: at 18:30

## 2024-03-20 RX ADMIN — ERTAPENEM 1000 MG: 1 INJECTION INTRAMUSCULAR; INTRAVENOUS at 16:58

## 2024-03-20 RX ADMIN — HEPARIN SODIUM 5000 UNITS: 5000 INJECTION INTRAVENOUS; SUBCUTANEOUS at 05:39

## 2024-03-20 RX ADMIN — CLONIDINE HYDROCHLORIDE 0.2 MG: 0.2 TABLET ORAL at 14:45

## 2024-03-20 RX ADMIN — FLUOXETINE 20 MG: 20 CAPSULE ORAL at 08:24

## 2024-03-20 RX ADMIN — ARIPIPRAZOLE 5 MG: 10 TABLET ORAL at 08:24

## 2024-03-20 RX ADMIN — OXYCODONE HYDROCHLORIDE 10 MG: 10 TABLET ORAL at 18:22

## 2024-03-20 RX ADMIN — Medication 10 ML: at 08:27

## 2024-03-20 RX ADMIN — Medication 10 ML: at 21:03

## 2024-03-20 RX ADMIN — 0.12% CHLORHEXIDINE GLUCONATE 15 ML: 1.2 RINSE ORAL at 08:24

## 2024-03-20 RX ADMIN — QUETIAPINE FUMARATE 12.5 MG: 25 TABLET ORAL at 08:24

## 2024-03-20 RX ADMIN — HEPARIN SODIUM 5000 UNITS: 5000 INJECTION INTRAVENOUS; SUBCUTANEOUS at 14:45

## 2024-03-20 RX ADMIN — CARVEDILOL 12.5 MG: 12.5 TABLET, FILM COATED ORAL at 20:59

## 2024-03-20 RX ADMIN — BRIMONIDINE TARTRATE 1 DROP: 2 SOLUTION/ DROPS OPHTHALMIC at 08:27

## 2024-03-20 RX ADMIN — CARVEDILOL 12.5 MG: 12.5 TABLET, FILM COATED ORAL at 08:24

## 2024-03-20 RX ADMIN — TORSEMIDE 5 MG: 10 TABLET ORAL at 14:45

## 2024-03-20 RX ADMIN — ALBUTEROL SULFATE 2.5 MG: 2.5 SOLUTION RESPIRATORY (INHALATION) at 01:47

## 2024-03-20 RX ADMIN — OXYCODONE HYDROCHLORIDE 10 MG: 10 TABLET ORAL at 12:48

## 2024-03-20 RX ADMIN — DEXMEDETOMIDINE HYDROCHLORIDE 1 MCG/KG/HR: 400 INJECTION, SOLUTION INTRAVENOUS at 05:48

## 2024-03-20 RX ADMIN — WHITE PETROLATUM 57.7 %-MINERAL OIL 31.9 % EYE OINTMENT: at 10:28

## 2024-03-20 NOTE — PROGRESS NOTES
INTENSIVIST   PROGRESS NOTE     Hospital:  LOS: 10 days     Mr. Omkar Nava, 68 y.o. male is followed for a Chief Complaint of: Respiratory failure, pneumonia      Subjective   S     Interval History:  No acute events. Apneic on spontaneous breathing trial this AM.        The patient's relevant past medical, surgical and social history were reviewed and updated in Epic as appropriate.      ROS: Unable to obtain secondary to sedation and mechanical ventilation.     Objective   O     Vitals:  Temp  Min: 97.9 °F (36.6 °C)  Max: 99.3 °F (37.4 °C)  BP  Min: 116/58  Max: 149/61  Pulse  Min: 60  Max: 76  Resp  Min: 17  Max: 19  SpO2  Min: 90 %  Max: 96 % No data recorded    Intake/Ouptut 24 hrs (7:00AM - 6:59 AM)  Intake & Output (last 3 days)         03/15 0701  03/16 0700 03/16 0701  03/17 0700 03/17 0701  03/18 0700 03/18 0701 03/19 0700    I.V. (mL/kg) 1860.5 (20.4) 836.4 (8.5) 2531.7 (25.6) 398.6 (4)    Blood        Other 181 193 321 170    NG/ 637 722 243    IV Piggyback 52.7  278.5     Total Intake(mL/kg) 2652.2 (29.1) 1666.4 (16.9) 3853.2 (39) 811.6 (8.2)    Urine (mL/kg/hr) 3765 (1.7) 1815 (0.8) 1475 (0.6) 325 (0.4)    Stool   0     Total Output 3765 1815 1475 325    Net -1112.8 -148.6 +2378.2 +486.6            Stool Unmeasured Occurrence   3 x             Medications (drips):  dexmedetomidine, Last Rate: 1 mcg/kg/hr (03/20/24 1446)  fentanyl 10 mcg/mL, Last Rate: Stopped (03/20/24 0842)        Mechanical Ventilator Settings:          Resp Rate (Set): 18     FiO2 (%): 45 %  PEEP/CPAP (cm H2O): 5 cm H20    Minute Ventilation (L/min) (Obs): 7.34 L/min  Resp Rate (Observed) Vent: 19  I:E Ratio (Set): 1:2.33  I:E Ratio (Obs): 1:2.3    PIP Observed (cm H2O): 25 cm H2O  Plateau Pressure (cm H2O): (S) 24 cm H2O    Physical Examination  Telemetry:  Normal sinus rhythm.    Constitutional:  No acute distress.  ETT in place on mechanical ventilation.    Eyes: No scleral icterus.   PERRL, EOM intact.    Neck:   Supple, FROM   Cardiovascular: Normal rate, regular and rhythm. Normal heart sounds.  No murmurs, gallop or rub.   Respiratory: No respiratory distress. Normal respiratory effort.  Diminished. No wheezing.    Abdominal:  Soft. No masses. Nontender. No distension. No HSM.   Extremities: No digital cyanosis. No clubbing.  No peripheral edema.   Skin: No rashes, lesions or ulcers   Neurological:   Sedated. Moves extremities to stimulation. Does not follow commands.              Results from last 7 days   Lab Units 03/20/24  0424 03/19/24 0329 03/18/24  0322   WBC 10*3/mm3 14.48* 12.32* 9.80   HEMOGLOBIN g/dL 8.7* 8.8* 8.6*   MCV fL 91.4 90.9 91.3   PLATELETS 10*3/mm3 290 213 156     Results from last 7 days   Lab Units 03/20/24  0424 03/19/24 0329 03/18/24  0322   SODIUM mmol/L 143 149* 149*   POTASSIUM mmol/L 3.5 4.0 3.6   CO2 mmol/L 26.0 25.0 23.0   CREATININE mg/dL 1.70* 1.82* 1.71*   GLUCOSE mg/dL 211* 201* 197*   MAGNESIUM mg/dL 2.4 2.1 2.2   PHOSPHORUS mg/dL 2.0* 2.4* 2.4*     Estimated Creatinine Clearance: 42.1 mL/min (A) (by C-G formula based on SCr of 1.7 mg/dL (H)).  Results from last 7 days   Lab Units 03/14/24  0358   ALK PHOS U/L 83   BILIRUBIN mg/dL 0.2   BILIRUBIN DIRECT mg/dL <0.2   ALT (SGPT) U/L 33   AST (SGOT) U/L 30       Results from last 7 days   Lab Units 03/20/24  0343 03/19/24  0427 03/18/24  0426 03/17/24  0315 03/16/24  0447   PH, ARTERIAL pH units 7.441 7.363 7.387 7.406 7.396   PCO2, ARTERIAL mm Hg 39.1 46.8* 44.2 44.6 45.7*   PO2 ART mm Hg 77.6* 61.2* 58.7* 106.0 82.8*   FIO2 % 50 45 50 50 40       Images:  Imaging Results (Last 24 Hours)       Procedure Component Value Units Date/Time    XR Chest 1 View [[id number]] Collected: 03/20/24 0742     Updated: 03/20/24 0747    Narrative:      XR CHEST 1 VW    Date of Exam: 3/20/2024 3:29 AM EDT    Indication: to confirm placement of ET Tube -Manual Prone Protocol    Comparison: 3/19/2024    Findings:  ET tube is in good position midway between  the clavicles and sahra. Left IJ catheter remains in the distal SVC. Feeding tube is seen below the left hemidiaphragm. Heart is upper normal size. There is diffuse and extensive pulmonary interstitial disease,   resembling interstitial edema, and there is stable focal dense atelectasis in the medial left lower lobe. No pneumothorax is seen.      Impression:      Impression:    1. ET tube in good position.    2. Extensive but stable pulmonary interstitial disease pattern, and stable left basilar atelectasis.      Electronically Signed: Wisam Oquendo MD    3/20/2024 7:44 AM EDT    Workstation ID: [id number]               Results: Reviewed.  I reviewed the patient's new laboratory and imaging results.  I independently reviewed the patient's new images.    Medications: Reviewed.    Assessment & Plan   A / P     Mr. Nava is a 68-year-old gentleman with diabetes mellitus complicated by blindness, osteomyelitis, chronic kidney disease, underlying dementia with psychosis, dependent for mobility, bathing and dressing, chronic anemia, heart failure with normal EF who is now admitted for his third admission in the last 2.5 months for an aspiration event with aspiration pneumonia.  On his previous admission a modified barium swallow did reveal moderate oral and mild pharyngeal dysphagia February 5, 2024.  Respiratory status deteriorated overnight on March 10 and he required intubation.  He initially was requiring 35% oxygen over March 13, 14th oxygenation worsened and he required 80% FiO2.  CTA of the chest was repeated and was negative for PE but revealed worsening bilateral pneumonia.  He developed abdominal distention.  CTA of the abdomen revealed no bowel obstruction or ischemic bowel.       Respiratory cultures are growing ESBL Klebsiella and antibiotics were changed to Ertapenem on 3/16.     Fentanyl is off. Placed on spontaneous breathing trial but was apneic.     Nutrition:   NPO Diet NPO Type: Strict NPO  Advance  Directives:   Code Status and Medical Interventions:   Ordered at: 03/10/24 1302     Code Status (Patient has no pulse and is not breathing):    CPR (Attempt to Resuscitate)     Medical Interventions (Patient has pulse or is breathing):    Full Support       Active Hospital Problems    Diagnosis     **Acute respiratory failure with hypoxemia     Healthcare-associated pneumonia     Acute kidney injury superimposed on chronic kidney disease     AMS (altered mental status)     Dysphagia     Dementia     Protein calorie malnutrition     Neurocognitive disorder     Essential hypertension     Type 2 diabetes mellitus with diabetic chronic kidney disease     Iron deficiency anemia        Assessment / Plan:    Continue mechanical ventilation. Titrate FiO2 as tolerated. Failed SBT this AM secondary to apnea.   Continue Precedex. Hold Fentanyl.   Continue bowel regimen  Continue tube feeds.   Ertapenem for pneumonia.   Nephrology following for JAYY.   AM labs and CXR.     Attempted to call the patient's daughter but did not get an answer.     High risk secondary to management of mechanical ventilation.     High level of risk due to:  severe exacerbation of chronic illness and illness with threat to life or bodily function.    Plan of care and goals reviewed during interdisciplinary rounds.  I discussed the patient's findings and my recommendations with nursing staff      Radha Wetzel DO    Intensive Care Medicine and Pulmonary Medicine

## 2024-03-20 NOTE — PROGRESS NOTES
"   LOS: 10 days    Patient Care Team:  Deann Cao MD as PCP - General (Internal Medicine)    Consulted for: Acute on chronic renal failure, hyperkalemia  68-year-old CKD stage III admitted with shortness of breath, choking of food at NH, admitted with JAYY, hyperkalemia, acute respiratory failure now intubated, anemia.  Later developed oliguria with increasing BUN/creatinine.  Patient was treated with Lokelma, bicarb, dextrose, insulin, calcium gluconate.    Subjective     Intubated and sedated.   Not on pressors.         Review of Systems:    Review of systems could not be obtained due to  patient intubated. emergent nature of case.    Objective     Vital Sign Min/Max for last 24 hours  Temp  Min: 97.9 °F (36.6 °C)  Max: 99.3 °F (37.4 °C)   BP  Min: 109/48  Max: 149/61   Pulse  Min: 60  Max: 76   Resp  Min: 17  Max: 19   SpO2  Min: 88 %  Max: 96 %   No data recorded   Weight  Min: 94.6 kg (208 lb 8.9 oz)  Max: 94.6 kg (208 lb 8.9 oz)     Flowsheet Rows      Flowsheet Row First Filed Value   Admission Height 151.9 cm (59.8\") Documented at 03/10/2024 0910   Admission Weight 84.8 kg (187 lb) Documented at 03/10/2024 0910            No intake/output data recorded.  I/O last 3 completed shifts:  In: 3677.5 [I.V.:1019.5; Other:863; NG/GT:1795]  Out: 1842 [Urine:1842]    Physical Exam:  General Appearance: AA male critically ill remain on ventilator  Neck: Restricted due to intubation no JVD  Lungs: Ventilator sound heard, equal chest movement, nonlabored.  Heart: No gallop, murmur, rub, RRR.  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    : + Story catheter in place.     WBC WBC   Date Value Ref Range Status   03/20/2024 14.48 (H) 3.40 - 10.80 10*3/mm3 Final   03/19/2024 12.32 (H) 3.40 - 10.80 10*3/mm3 Final   03/18/2024 9.80 3.40 - 10.80 10*3/mm3 Final      HGB Hemoglobin " "  Date Value Ref Range Status   03/20/2024 8.7 (L) 13.0 - 17.7 g/dL Final   03/19/2024 8.8 (L) 13.0 - 17.7 g/dL Final   03/18/2024 8.6 (L) 13.0 - 17.7 g/dL Final      HCT Hematocrit   Date Value Ref Range Status   03/20/2024 28.6 (L) 37.5 - 51.0 % Final   03/19/2024 29.1 (L) 37.5 - 51.0 % Final   03/18/2024 28.5 (L) 37.5 - 51.0 % Final      Platlets No results found for: \"LABPLAT\"   MCV MCV   Date Value Ref Range Status   03/20/2024 91.4 79.0 - 97.0 fL Final   03/19/2024 90.9 79.0 - 97.0 fL Final   03/18/2024 91.3 79.0 - 97.0 fL Final          Sodium Sodium   Date Value Ref Range Status   03/20/2024 143 136 - 145 mmol/L Final   03/19/2024 149 (H) 136 - 145 mmol/L Final   03/18/2024 149 (H) 136 - 145 mmol/L Final      Potassium Potassium   Date Value Ref Range Status   03/20/2024 3.5 3.5 - 5.2 mmol/L Final     Comment:     Slight hemolysis detected by analyzer. Result may be falsely elevated.   03/19/2024 4.0 3.5 - 5.2 mmol/L Final   03/18/2024 3.6 3.5 - 5.2 mmol/L Final      Chloride Chloride   Date Value Ref Range Status   03/20/2024 110 (H) 98 - 107 mmol/L Final   03/19/2024 115 (H) 98 - 107 mmol/L Final   03/18/2024 116 (H) 98 - 107 mmol/L Final      CO2 CO2   Date Value Ref Range Status   03/20/2024 26.0 22.0 - 29.0 mmol/L Final   03/19/2024 25.0 22.0 - 29.0 mmol/L Final   03/18/2024 23.0 22.0 - 29.0 mmol/L Final      BUN BUN   Date Value Ref Range Status   03/20/2024 55 (H) 8 - 23 mg/dL Final   03/19/2024 50 (H) 8 - 23 mg/dL Final   03/18/2024 48 (H) 8 - 23 mg/dL Final      Creatinine Creatinine   Date Value Ref Range Status   03/20/2024 1.70 (H) 0.76 - 1.27 mg/dL Final   03/19/2024 1.82 (H) 0.76 - 1.27 mg/dL Final   03/18/2024 1.71 (H) 0.76 - 1.27 mg/dL Final      Calcium Calcium   Date Value Ref Range Status   03/20/2024 8.4 (L) 8.6 - 10.5 mg/dL Final   03/19/2024 8.3 (L) 8.6 - 10.5 mg/dL Final   03/18/2024 8.3 (L) 8.6 - 10.5 mg/dL Final      PO4 No results found for: \"CAPO4\"   Albumin No results found for: " "\"ALBUMIN\"     Magnesium Magnesium   Date Value Ref Range Status   03/20/2024 2.4 1.6 - 2.4 mg/dL Final   03/19/2024 2.1 1.6 - 2.4 mg/dL Final   03/18/2024 2.2 1.6 - 2.4 mg/dL Final        Uric Acid No results found for: \"URICACID\"        Results Review:     I reviewed the patient's new clinical results.    albuterol, 2.5 mg, Nebulization, Q6H - RT  amLODIPine, 10 mg, Nasogastric, Q24H  ARIPiprazole, 5 mg, Nasogastric, Daily  artificial tears, , Both Eyes, Q4H  brimonidine, 1 drop, Both Eyes, TID   And  timolol, 1 drop, Both Eyes, BID  carvedilol, 12.5 mg, Nasogastric, Q12H  chlorhexidine, 15 mL, Mouth/Throat, Q12H  cloNIDine, 0.2 mg, Nasogastric, Q8H  sennosides, 10 mL, Nasogastric, BID   And  docusate sodium, 100 mg, Nasogastric, BID  ertapenem, 1,000 mg, Intravenous, Q24H  FLUoxetine, 20 mg, Nasogastric, BID  heparin (porcine), 5,000 Units, Subcutaneous, Q8H  hydrALAZINE, 100 mg, Nasogastric, Q8H  insulin regular, 2-9 Units, Subcutaneous, Q6H  oxyCODONE, 10 mg, Nasogastric, Q6H  pantoprazole, 40 mg, Intravenous, Q12H  polyethylene glycol, 17 g, Nasogastric, BID  QUEtiapine, 12.5 mg, Nasogastric, Daily  QUEtiapine, 25 mg, Nasogastric, Nightly  sodium chloride, 10 mL, Intravenous, Q12H  terazosin, 5 mg, Nasogastric, Q12H      dexmedetomidine, 0.2-1.5 mcg/kg/hr (Order-Specific), Last Rate: 1 mcg/kg/hr (03/20/24 0548)  fentanyl 10 mcg/mL,  mcg/hr, Last Rate: Stopped (03/20/24 0842)        Medication Review: Reviewed    Results from last 7 days   Lab Units 03/20/24  0424 03/19/24  0329 03/18/24  0322 03/17/24  0314 03/14/24  1209 03/14/24  0358   SODIUM mmol/L 143 149* 149* 150*   < > 143   POTASSIUM mmol/L 3.5 4.0 3.6 3.9   < > 3.6   CHLORIDE mmol/L 110* 115* 116* 113*   < > 109*   CO2 mmol/L 26.0 25.0 23.0 25.0   < > 25.0   BUN mg/dL 55* 50* 48* 46*   < > 35*   CREATININE mg/dL 1.70* 1.82* 1.71* 1.83*   < > 1.69*   CALCIUM mg/dL 8.4* 8.3* 8.3* 8.3*   < > 8.7   ALBUMIN g/dL  --   --   --   --   --  3.2*  3.1* "   WBC 10*3/mm3 14.48* 12.32* 9.80 9.90   < > 10.64   HEMOGLOBIN g/dL 8.7* 8.8* 8.6* 8.7*   < > 6.8*   PLATELETS 10*3/mm3 290 213 156 137*   < > 126*    < > = values in this interval not displayed.             Assessment & Plan       Acute respiratory failure with hypoxemia    Essential hypertension    Neurocognitive disorder    AMS (altered mental status)    Dementia    Dysphagia    Iron deficiency anemia    Protein calorie malnutrition    Type 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  Acute renal failure on CKD stage III acute rise in creatinine with recent event.  Decreased renal perfusion, hypoxia, IV contrast.  2.  Hyperkalemia multifactorial due to JAYY, respiratory failure, acidosis.  3.  CKD stage III - Baseline Scr 1.6- 1.8.  CT abdomen showed normal-sized kidneys with no stones or hydronephrosis.ultrasound revealed right kidney 12.1 cm, left kidney 11.3 cm without hydronephrosis.   4.  Respite failure on ventilator CT angio negative for pulmonary embolism  5.  Essential hypertension  6.  Altered mental status  7.  Bradycardia  8.  Dementia     Recommendations:  - Continue free water.   - Keep MAP greater than 65 mmHg.  - Avoid nephrotoxic medications.  - Adjust medication according to new GFR.  - iwill start torsemide at 5 mg po q daily.     High risk complex patient multiple medical problems.      Farshad Jeong MD  03/20/24  09:31 EDT

## 2024-03-20 NOTE — PLAN OF CARE
Problem: Skin Injury Risk Increased  Goal: Skin Health and Integrity  Outcome: Ongoing, Not Progressing  Intervention: Optimize Skin Protection  Recent Flowsheet Documentation  Taken 3/20/2024 1800 by Brandy Mancilla RN  Pressure Reduction Techniques: weight shift assistance provided  Head of Bed ([facility]) Positioning: [facility] elevated  Pressure Reduction Devices: pressure-redistributing mattress utilized  Skin Protection:   adhesive use limited   tubing/devices free from skin contact   transparent dressing maintained  Taken 3/20/2024 1600 by Brandy Mancilla RN  Pressure Reduction Techniques: weight shift assistance provided  Head of Bed ([facility]) Positioning: [facility] elevated  Pressure Reduction Devices: pressure-redistributing mattress utilized  Skin Protection:   adhesive use limited   tubing/devices free from skin contact   transparent dressing maintained  Taken 3/20/2024 1400 by Brandy Mancilla RN  Pressure Reduction Techniques: weight shift assistance provided  Head of Bed ([facility]) Positioning: [facility] elevated  Pressure Reduction Devices: pressure-redistributing mattress utilized  Skin Protection:   adhesive use limited   tubing/devices free from skin contact   transparent dressing maintained  Taken 3/20/2024 1200 by Brandy Mancilla RN  Pressure Reduction Techniques: weight shift assistance provided  Head of Bed ([facility]) Positioning: [facility] elevated  Pressure Reduction Devices: pressure-redistributing mattress utilized  Skin Protection:   adhesive use limited   tubing/devices free from skin contact   transparent dressing maintained  Taken 3/20/2024 1100 by Brandy Mancilla RN  Head of Bed ([facility]) Positioning: [facility] elevated  Taken 3/20/2024 1000 by Brandy Mancilla RN  Pressure Reduction Techniques: weight shift assistance provided  Pressure Reduction Devices: pressure-redistributing mattress utilized  Skin Protection:   adhesive use limited   tubing/devices free from skin contact   transparent dressing maintained  Taken  3/20/2024 0800 by Brandy Mancilla RN  Pressure Reduction Techniques: weight shift assistance provided  Head of Bed ([facility]) Positioning: [facility] elevated  Pressure Reduction Devices: pressure-redistributing mattress utilized  Skin Protection:   adhesive use limited   tubing/devices free from skin contact   transparent dressing maintained     Problem: Fall Injury Risk  Goal: Absence of Fall and Fall-Related Injury  Outcome: Ongoing, Not Progressing  Intervention: Promote Injury-Free Environment  Recent Flowsheet Documentation  Taken 3/20/2024 1800 by Brandy Mancilla RN  Safety Promotion/Fall Prevention: safety round/check completed  Taken 3/20/2024 1600 by Brandy Mancilla RN  Safety Promotion/Fall Prevention: safety round/check completed  Taken 3/20/2024 1400 by Brandy Mancilla RN  Safety Promotion/Fall Prevention: safety round/check completed  Taken 3/20/2024 1200 by Brandy Mancilla RN  Safety Promotion/Fall Prevention: safety round/check completed  Taken 3/20/2024 1000 by Brandy Mancilla RN  Safety Promotion/Fall Prevention: safety round/check completed  Taken 3/20/2024 0800 by Brandy Mancilla RN  Safety Promotion/Fall Prevention: safety round/check completed     Problem: Adult Inpatient Plan of Care  Goal: Plan of Care Review  Outcome: Ongoing, Not Progressing  Goal: Patient-Specific Goal (Individualized)  Outcome: Ongoing, Not Progressing  Goal: Absence of Hospital-Acquired Illness or Injury  Outcome: Ongoing, Not Progressing  Intervention: Identify and Manage Fall Risk  Recent Flowsheet Documentation  Taken 3/20/2024 1800 by Brandy Mancilla RN  Safety Promotion/Fall Prevention: safety round/check completed  Taken 3/20/2024 1600 by Brandy Mancilla RN  Safety Promotion/Fall Prevention: safety round/check completed  Taken 3/20/2024 1400 by Branyd Mancilla RN  Safety Promotion/Fall Prevention: safety round/check completed  Taken 3/20/2024 1200 by Brandy Mancilla RN  Safety Promotion/Fall Prevention:  safety round/check completed  Taken 3/20/2024 1000 by Brandy Mancilla RN  Safety Promotion/Fall Prevention: safety round/check completed  Taken 3/20/2024 0800 by Brandy Mancilla RN  Safety Promotion/Fall Prevention: safety round/check completed  Intervention: Prevent Skin Injury  Recent Flowsheet Documentation  Taken 3/20/2024 1800 by Brandy Mancilla RN  Body Position: supine  Skin Protection:   adhesive use limited   tubing/devices free from skin contact   transparent dressing maintained  Taken 3/20/2024 1600 by Brandy Mancilla RN  Body Position:   turned   right  Skin Protection:   adhesive use limited   tubing/devices free from skin contact   transparent dressing maintained  Taken 3/20/2024 1400 by Brandy Mancilla RN  Body Position: supine  Skin Protection:   adhesive use limited   tubing/devices free from skin contact   transparent dressing maintained  Taken 3/20/2024 1200 by Brandy Mancilla RN  Body Position:   turned   right  Skin Protection:   adhesive use limited   tubing/devices free from skin contact   transparent dressing maintained  Taken 3/20/2024 1100 by Brandy Mancilla RN  Body Position: supine  Taken 3/20/2024 1000 by Brandy Mancilla RN  Skin Protection:   adhesive use limited   tubing/devices free from skin contact   transparent dressing maintained  Taken 3/20/2024 0800 by Brandy Mancilla RN  Body Position:   turned   left  Skin Protection:   adhesive use limited   tubing/devices free from skin contact   transparent dressing maintained  Intervention: Prevent and Manage VTE (Venous Thromboembolism) Risk  Recent Flowsheet Documentation  Taken 3/20/2024 1800 by Brandy Mancilla RN  Activity Management: bedrest  Taken 3/20/2024 1600 by Brandy Mancilla RN  Activity Management: bedrest  Taken 3/20/2024 1400 by Brandy Mancilla RN  Activity Management: bedrest  Taken 3/20/2024 1200 by Brandy Mancilla RN  Activity Management: bedrest  VTE Prevention/Management:   bilateral    sequential compression devices off  Taken 3/20/2024 1000 by Brandy Mancilla RN  Activity Management: bedrest  Taken 3/20/2024 0800 by Brandy Mancilla RN  Activity Management: bedrest  VTE Prevention/Management:   bilateral   sequential compression devices off  Goal: Optimal Comfort and Wellbeing  Outcome: Ongoing, Not Progressing  Intervention: Provide Person-Centered Care  Recent Flowsheet Documentation  Taken 3/20/2024 1800 by Brandy Mancilla RN  Trust Relationship/Rapport: care explained  Taken 3/20/2024 1200 by Brandy Mancilla RN  Trust Relationship/Rapport: care explained  Taken 3/20/2024 1000 by Brandy Mancilla RN  Trust Relationship/Rapport: care explained  Taken 3/20/2024 0800 by Brandy Mancilla RN  Trust Relationship/Rapport: care explained  Goal: Readiness for Transition of Care  Outcome: Ongoing, Not Progressing     Problem: Communication Impairment (Mechanical Ventilation, Invasive)  Goal: Effective Communication  Outcome: Ongoing, Not Progressing     Problem: Device-Related Complication Risk (Mechanical Ventilation, Invasive)  Goal: Optimal Device Function  Outcome: Ongoing, Not Progressing     Problem: Inability to Wean (Mechanical Ventilation, Invasive)  Goal: Mechanical Ventilation Liberation  Outcome: Ongoing, Not Progressing     Problem: Nutrition Impairment (Mechanical Ventilation, Invasive)  Goal: Optimal Nutrition Delivery  Outcome: Ongoing, Not Progressing     Problem: Skin and Tissue Injury (Mechanical Ventilation, Invasive)  Goal: Absence of Device-Related Skin and Tissue Injury  Outcome: Ongoing, Not Progressing  Intervention: Maintain Skin and Tissue Health  Recent Flowsheet Documentation  Taken 3/20/2024 1800 by Brandy Mancilla RN  Device Skin Pressure Protection: absorbent pad utilized/changed  Taken 3/20/2024 1600 by Brandy Mancilla RN  Device Skin Pressure Protection: absorbent pad utilized/changed  Taken 3/20/2024 1400 by Brandy Mancilla RN  Device Skin Pressure  Protection: absorbent pad utilized/changed  Taken 3/20/2024 1200 by Brandy Mancilla RN  Device Skin Pressure Protection: absorbent pad utilized/changed  Taken 3/20/2024 1000 by Brandy Mancilla RN  Device Skin Pressure Protection: absorbent pad utilized/changed  Taken 3/20/2024 0800 by Brandy Mancilla RN  Device Skin Pressure Protection: absorbent pad utilized/changed     Problem: Ventilator-Induced Lung Injury (Mechanical Ventilation, Invasive)  Goal: Absence of Ventilator-Induced Lung Injury  Outcome: Ongoing, Not Progressing  Intervention: Prevent Ventilator-Associated Pneumonia  Recent Flowsheet Documentation  Taken 3/20/2024 1800 by Brandy Mancilla RN  Head of Bed ([facility]) Positioning: [facility] elevated  Taken 3/20/2024 1600 by Brandy Mancilla RN  Head of Bed ([facility]) Positioning: [facility] elevated  Oral Care:   teeth brushed - suction toothbrush   tongue brushed  Taken 3/20/2024 1400 by Brandy Mancilla RN  Head of Bed ([facility]) Positioning: [facility] elevated  Taken 3/20/2024 1200 by Brandy Mancilla RN  Head of Bed ([facility]) Positioning: [facility] elevated  Oral Care:   teeth brushed - suction toothbrush   tongue brushed  Taken 3/20/2024 1100 by Brandy Mancilla RN  Head of Bed ([facility]) Positioning: [facility] elevated  Taken 3/20/2024 0800 by Brandy Mancilla RN  Head of Bed ([facility]) Positioning: [facility] elevated  Oral Care:   teeth brushed - suction toothbrush   tongue brushed     Problem: Adjustment to Illness (Sepsis/Septic Shock)  Goal: Optimal Coping  Outcome: Ongoing, Not Progressing     Problem: Bleeding (Sepsis/Septic Shock)  Goal: Absence of Bleeding  Outcome: Ongoing, Not Progressing     Problem: Glycemic Control Impaired (Sepsis/Septic Shock)  Goal: Blood Glucose Level Within Desired Range  Outcome: Ongoing, Not Progressing     Problem: Infection Progression (Sepsis/Septic Shock)  Goal: Absence of Infection Signs and Symptoms  Outcome: Ongoing, Not Progressing  Intervention: Promote Recovery  Recent Flowsheet Documentation  Taken  3/20/2024 1800 by Brandy Mancilla RN  Activity Management: bedrest  Taken 3/20/2024 1600 by Brandy Mancilla RN  Activity Management: bedrest  Taken 3/20/2024 1400 by Brandy Mancilla RN  Activity Management: bedrest  Taken 3/20/2024 1200 by Brandy Mancilla RN  Activity Management: bedrest  Taken 3/20/2024 1000 by Brandy Mancilla RN  Activity Management: bedrest  Taken 3/20/2024 0800 by Brandy Mancilla RN  Activity Management: bedrest     Problem: Nutrition Impaired (Sepsis/Septic Shock)  Goal: Optimal Nutrition Intake  Outcome: Ongoing, Not Progressing     Problem: Restraint, Nonviolent  Goal: Absence of Harm or Injury  Outcome: Ongoing, Not Progressing  Intervention: Implement Least Restrictive Safety Strategies  Recent Flowsheet Documentation  Taken 3/20/2024 1800 by Brandy Mancilla RN  Medical Device Protection:   IV pole/bag removed from visual field   torso covered   tubing secured  Less Restrictive Alternative:   calming techniques promoted   bed alarm in use   positive reinforcement provided  De-Escalation Techniques: stimulation decreased  Diversional Activities: music  Taken 3/20/2024 1600 by Brandy Mancilla RN  Medical Device Protection:   IV pole/bag removed from visual field   tubing secured  Less Restrictive Alternative:   calming techniques promoted   bed alarm in use   positive reinforcement provided  De-Escalation Techniques: stimulation decreased  Diversional Activities: music  Taken 3/20/2024 1400 by Brandy Mancilla RN  Medical Device Protection:   IV pole/bag removed from visual field   tubing secured  Less Restrictive Alternative:   calming techniques promoted   bed alarm in use   positive reinforcement provided  De-Escalation Techniques: stimulation decreased  Diversional Activities: music  Taken 3/20/2024 1200 by Brandy Mancilla RN  Medical Device Protection:   IV pole/bag removed from visual field   tubing secured   torso covered  Less Restrictive Alternative:   calming  techniques promoted   bed alarm in use   positive reinforcement provided  De-Escalation Techniques: stimulation decreased  Diversional Activities: music  Taken 3/20/2024 1000 by Brandy Mancilla RN  Medical Device Protection:   IV pole/bag removed from visual field   tubing secured   torso covered  Less Restrictive Alternative:   calming techniques promoted   bed alarm in use   positive reinforcement provided  De-Escalation Techniques: stimulation decreased  Diversional Activities: music  Taken 3/20/2024 0800 by Brandy Mancilla RN  Medical Device Protection:   IV pole/bag removed from visual field   tubing secured   torso covered  Less Restrictive Alternative:   calming techniques promoted   bed alarm in use   positive reinforcement provided  De-Escalation Techniques: stimulation decreased  Diversional Activities: music  Intervention: Protect Dignity, Rights, and Personal Wellbeing  Recent Flowsheet Documentation  Taken 3/20/2024 1800 by Brandy Mancilla RN  Trust Relationship/Rapport: care explained  Taken 3/20/2024 1200 by Brandy Mancilla RN  Trust Relationship/Rapport: care explained  Taken 3/20/2024 1000 by Brandy Mancilla RN  Trust Relationship/Rapport: care explained  Taken 3/20/2024 0800 by Brandy Mancilla RN  Trust Relationship/Rapport: care explained  Intervention: Protect Skin and Joint Integrity  Recent Flowsheet Documentation  Taken 3/20/2024 1800 by Brandy Mancilla RN  Body Position: supine  Taken 3/20/2024 1600 by Brandy Mancilla RN  Body Position:   turned   right  Taken 3/20/2024 1400 by Brandy Mancilla RN  Body Position: supine  Taken 3/20/2024 1200 by Brandy Mancilla RN  Body Position:   turned   right  Taken 3/20/2024 1100 by Brandy Mancilla RN  Body Position: supine  Taken 3/20/2024 0800 by Brandy Mancilla RN  Body Position:   turned   left     Problem: Diabetes Comorbidity  Goal: Blood Glucose Level Within Targeted Range  Outcome: Ongoing, Not Progressing     Problem: Heart  Failure Comorbidity  Goal: Maintenance of Heart Failure Symptom Control  Outcome: Ongoing, Not Progressing     Problem: Hypertension Comorbidity  Goal: Blood Pressure in Desired Range  Outcome: Ongoing, Not Progressing   Goal Outcome Evaluation:

## 2024-03-20 NOTE — CASE MANAGEMENT/SOCIAL WORK
Continued Stay Note  [facility]     Patient Name: Omkar Nava  MRN: [id number]  Today's Date: 3/20/2024    Admit Date: 3/10/2024    Plan: Ongoing   Discharge Plan       Row Name 03/20/24 1110       Plan    Plan Ongoing    Patient/Family in Agreement with Plan other (see comments)    Plan Comments Pt was discussed in Medical Rounds today. Pt remains on the mechanical ventilator and sedated. PT has NJT and Tube Feeds. Dr. Wetzel is going to speak with family today about Goals of Care. Pt WBC is elevated today.  will continue to follow.    Final Discharge Disposition Code 30 - still a patient                   Discharge Codes    No documentation.                 Expected Discharge Date and Time       Expected Discharge Date Expected Discharge Time    Mar 27, 2024               Rosemarie Yarbrough RN

## 2024-03-21 ENCOUNTER — APPOINTMENT (OUTPATIENT)
Dept: GENERAL RADIOLOGY | Facility: HOSPITAL | Age: 69
End: 2024-03-21
Payer: MEDICARE

## 2024-03-21 LAB
ANION GAP SERPL CALCULATED.3IONS-SCNC: 7 MMOL/L (ref 5–15)
BUN SERPL-MCNC: 48 MG/DL (ref 8–23)
BUN/CREAT SERPL: 33.3 (ref 7–25)
CALCIUM SPEC-SCNC: 7.8 MG/DL (ref 8.6–10.5)
CHLORIDE SERPL-SCNC: 114 MMOL/L (ref 98–107)
CO2 SERPL-SCNC: 27 MMOL/L (ref 22–29)
CREAT SERPL-MCNC: 1.44 MG/DL (ref 0.76–1.27)
DEPRECATED RDW RBC AUTO: 54.1 FL (ref 37–54)
EGFRCR SERPLBLD CKD-EPI 2021: 53.6 ML/MIN/1.73
ERYTHROCYTE [DISTWIDTH] IN BLOOD BY AUTOMATED COUNT: 16.3 % (ref 12.3–15.4)
GLUCOSE BLDC GLUCOMTR-MCNC: 213 MG/DL (ref 70–130)
GLUCOSE BLDC GLUCOMTR-MCNC: 221 MG/DL (ref 70–130)
GLUCOSE BLDC GLUCOMTR-MCNC: 264 MG/DL (ref 70–130)
GLUCOSE SERPL-MCNC: 232 MG/DL (ref 65–99)
HCT VFR BLD AUTO: 28.2 % (ref 37.5–51)
HGB BLD-MCNC: 8.6 G/DL (ref 13–17.7)
MAGNESIUM SERPL-MCNC: 2.2 MG/DL (ref 1.6–2.4)
MCH RBC QN AUTO: 27.7 PG (ref 26.6–33)
MCHC RBC AUTO-ENTMCNC: 30.5 G/DL (ref 31.5–35.7)
MCV RBC AUTO: 91 FL (ref 79–97)
PHOSPHATE SERPL-MCNC: 2.3 MG/DL (ref 2.5–4.5)
PLATELET # BLD AUTO: 381 10*3/MM3 (ref 140–450)
PMV BLD AUTO: 11.9 FL (ref 6–12)
POTASSIUM SERPL-SCNC: 3.6 MMOL/L (ref 3.5–5.2)
RBC # BLD AUTO: 3.1 10*6/MM3 (ref 4.14–5.8)
SODIUM SERPL-SCNC: 148 MMOL/L (ref 136–145)
WBC NRBC COR # BLD AUTO: 13.58 10*3/MM3 (ref 3.4–10.8)

## 2024-03-21 PROCEDURE — 94003 VENT MGMT INPAT SUBQ DAY: CPT

## 2024-03-21 PROCEDURE — 25010000002 LABETALOL 5 MG/ML SOLUTION: Performed by: NURSE PRACTITIONER

## 2024-03-21 PROCEDURE — 94761 N-INVAS EAR/PLS OXIMETRY MLT: CPT

## 2024-03-21 PROCEDURE — 63710000001 INSULIN DETEMIR PER 5 UNITS: Performed by: INTERNAL MEDICINE

## 2024-03-21 PROCEDURE — 80048 BASIC METABOLIC PNL TOTAL CA: CPT | Performed by: INTERNAL MEDICINE

## 2024-03-21 PROCEDURE — 83735 ASSAY OF MAGNESIUM: CPT | Performed by: INTERNAL MEDICINE

## 2024-03-21 PROCEDURE — 25010000002 HEPARIN (PORCINE) PER 1000 UNITS: Performed by: INTERNAL MEDICINE

## 2024-03-21 PROCEDURE — 84100 ASSAY OF PHOSPHORUS: CPT | Performed by: INTERNAL MEDICINE

## 2024-03-21 PROCEDURE — 94799 UNLISTED PULMONARY SVC/PX: CPT

## 2024-03-21 PROCEDURE — 25010000002 ERTAPENEM PER 500 MG

## 2024-03-21 PROCEDURE — 71045 X-RAY EXAM CHEST 1 VIEW: CPT

## 2024-03-21 PROCEDURE — 63710000001 INSULIN REGULAR HUMAN PER 5 UNITS: Performed by: INTERNAL MEDICINE

## 2024-03-21 PROCEDURE — 99233 SBSQ HOSP IP/OBS HIGH 50: CPT | Performed by: INTERNAL MEDICINE

## 2024-03-21 PROCEDURE — 82948 REAGENT STRIP/BLOOD GLUCOSE: CPT

## 2024-03-21 PROCEDURE — 94664 DEMO&/EVAL PT USE INHALER: CPT

## 2024-03-21 PROCEDURE — 85027 COMPLETE CBC AUTOMATED: CPT | Performed by: INTERNAL MEDICINE

## 2024-03-21 RX ORDER — POTASSIUM CHLORIDE 750 MG/1
40 CAPSULE, EXTENDED RELEASE ORAL ONCE
Status: COMPLETED | OUTPATIENT
Start: 2024-03-21 | End: 2024-03-21

## 2024-03-21 RX ORDER — DOCUSATE SODIUM 50 MG/5 ML
100 LIQUID (ML) ORAL 2 TIMES DAILY
Status: DISCONTINUED | OUTPATIENT
Start: 2024-03-21 | End: 2024-03-23

## 2024-03-21 RX ORDER — POTASSIUM CHLORIDE 1.5 G/1.58G
40 POWDER, FOR SOLUTION ORAL ONCE
Status: COMPLETED | OUTPATIENT
Start: 2024-03-21 | End: 2024-03-21

## 2024-03-21 RX ORDER — METOLAZONE 5 MG/1
2.5 TABLET ORAL DAILY
Status: DISCONTINUED | OUTPATIENT
Start: 2024-03-21 | End: 2024-03-21

## 2024-03-21 RX ORDER — METOLAZONE 5 MG/1
2.5 TABLET ORAL ONCE
Status: COMPLETED | OUTPATIENT
Start: 2024-03-21 | End: 2024-03-21

## 2024-03-21 RX ORDER — QUETIAPINE FUMARATE 25 MG/1
25 TABLET, FILM COATED ORAL EVERY 12 HOURS SCHEDULED
Status: DISCONTINUED | OUTPATIENT
Start: 2024-03-21 | End: 2024-03-23

## 2024-03-21 RX ADMIN — CLONIDINE HYDROCHLORIDE 0.2 MG: 0.2 TABLET ORAL at 14:41

## 2024-03-21 RX ADMIN — POLYETHYLENE GLYCOL 3350 17 G: 17 POWDER, FOR SOLUTION ORAL at 09:23

## 2024-03-21 RX ADMIN — BRIMONIDINE TARTRATE 1 DROP: 2 SOLUTION/ DROPS OPHTHALMIC at 09:16

## 2024-03-21 RX ADMIN — Medication 10 ML: at 20:39

## 2024-03-21 RX ADMIN — OXYCODONE HYDROCHLORIDE 10 MG: 10 TABLET ORAL at 14:41

## 2024-03-21 RX ADMIN — INSULIN DETEMIR 5 UNITS: 100 INJECTION, SOLUTION SUBCUTANEOUS at 20:40

## 2024-03-21 RX ADMIN — DOCUSATE SODIUM 100 MG: 50 LIQUID ORAL at 08:43

## 2024-03-21 RX ADMIN — HUMAN INSULIN 6 UNITS: 100 INJECTION, SOLUTION SUBCUTANEOUS at 05:34

## 2024-03-21 RX ADMIN — HUMAN INSULIN 2 UNITS: 100 INJECTION, SOLUTION SUBCUTANEOUS at 23:49

## 2024-03-21 RX ADMIN — QUETIAPINE FUMARATE 25 MG: 25 TABLET ORAL at 20:39

## 2024-03-21 RX ADMIN — BRIMONIDINE TARTRATE 1 DROP: 2 SOLUTION/ DROPS OPHTHALMIC at 16:06

## 2024-03-21 RX ADMIN — METOLAZONE 2.5 MG: 5 TABLET ORAL at 14:41

## 2024-03-21 RX ADMIN — DEXMEDETOMIDINE HYDROCHLORIDE 1 MCG/KG/HR: 400 INJECTION, SOLUTION INTRAVENOUS at 15:10

## 2024-03-21 RX ADMIN — HEPARIN SODIUM 5000 UNITS: 5000 INJECTION INTRAVENOUS; SUBCUTANEOUS at 22:33

## 2024-03-21 RX ADMIN — CARVEDILOL 12.5 MG: 12.5 TABLET, FILM COATED ORAL at 08:43

## 2024-03-21 RX ADMIN — DEXMEDETOMIDINE HYDROCHLORIDE 0.8 MCG/KG/HR: 400 INJECTION, SOLUTION INTRAVENOUS at 23:59

## 2024-03-21 RX ADMIN — HUMAN INSULIN 4 UNITS: 100 INJECTION, SOLUTION SUBCUTANEOUS at 14:41

## 2024-03-21 RX ADMIN — DEXMEDETOMIDINE HYDROCHLORIDE 0.8 MCG/KG/HR: 400 INJECTION, SOLUTION INTRAVENOUS at 19:39

## 2024-03-21 RX ADMIN — DEXMEDETOMIDINE HYDROCHLORIDE 0.8 MCG/KG/HR: 400 INJECTION, SOLUTION INTRAVENOUS at 10:41

## 2024-03-21 RX ADMIN — DEXMEDETOMIDINE HYDROCHLORIDE 0.8 MCG/KG/HR: 400 INJECTION, SOLUTION INTRAVENOUS at 05:44

## 2024-03-21 RX ADMIN — ALBUTEROL SULFATE 2.5 MG: 2.5 SOLUTION RESPIRATORY (INHALATION) at 07:14

## 2024-03-21 RX ADMIN — CARVEDILOL 12.5 MG: 12.5 TABLET, FILM COATED ORAL at 20:40

## 2024-03-21 RX ADMIN — CLONIDINE HYDROCHLORIDE 0.2 MG: 0.2 TABLET ORAL at 22:33

## 2024-03-21 RX ADMIN — TIMOLOL MALEATE 1 DROP: 5 SOLUTION/ DROPS OPHTHALMIC at 09:17

## 2024-03-21 RX ADMIN — QUETIAPINE FUMARATE 12.5 MG: 25 TABLET ORAL at 09:23

## 2024-03-21 RX ADMIN — OXYCODONE HYDROCHLORIDE 10 MG: 10 TABLET ORAL at 23:49

## 2024-03-21 RX ADMIN — 0.12% CHLORHEXIDINE GLUCONATE 15 ML: 1.2 RINSE ORAL at 20:39

## 2024-03-21 RX ADMIN — TERAZOSIN HYDROCHLORIDE ANHYDROUS 5 MG: 5 CAPSULE ORAL at 09:23

## 2024-03-21 RX ADMIN — POTASSIUM CHLORIDE 40 MEQ: 750 CAPSULE, EXTENDED RELEASE ORAL at 14:42

## 2024-03-21 RX ADMIN — TIMOLOL MALEATE 1 DROP: 5 SOLUTION/ DROPS OPHTHALMIC at 20:40

## 2024-03-21 RX ADMIN — DEXMEDETOMIDINE HYDROCHLORIDE 1 MCG/KG/HR: 400 INJECTION, SOLUTION INTRAVENOUS at 02:04

## 2024-03-21 RX ADMIN — POLYETHYLENE GLYCOL 3350 17 G: 17 POWDER, FOR SOLUTION ORAL at 20:39

## 2024-03-21 RX ADMIN — ALBUTEROL SULFATE 2.5 MG: 2.5 SOLUTION RESPIRATORY (INHALATION) at 13:30

## 2024-03-21 RX ADMIN — CLONIDINE HYDROCHLORIDE 0.2 MG: 0.2 TABLET ORAL at 05:09

## 2024-03-21 RX ADMIN — OXYCODONE HYDROCHLORIDE 10 MG: 10 TABLET ORAL at 00:29

## 2024-03-21 RX ADMIN — WHITE PETROLATUM 57.7 %-MINERAL OIL 31.9 % EYE OINTMENT 1 APPLICATION: at 08:44

## 2024-03-21 RX ADMIN — ALBUTEROL SULFATE 2.5 MG: 2.5 SOLUTION RESPIRATORY (INHALATION) at 19:05

## 2024-03-21 RX ADMIN — WHITE PETROLATUM 57.7 %-MINERAL OIL 31.9 % EYE OINTMENT: at 20:39

## 2024-03-21 RX ADMIN — HYDRALAZINE HYDROCHLORIDE 100 MG: 50 TABLET ORAL at 14:41

## 2024-03-21 RX ADMIN — DOCUSATE SODIUM 100 MG: 50 LIQUID ORAL at 20:39

## 2024-03-21 RX ADMIN — OXYCODONE HYDROCHLORIDE 10 MG: 10 TABLET ORAL at 05:09

## 2024-03-21 RX ADMIN — HUMAN INSULIN 4 UNITS: 100 INJECTION, SOLUTION SUBCUTANEOUS at 18:07

## 2024-03-21 RX ADMIN — TORSEMIDE 5 MG: 10 TABLET ORAL at 10:40

## 2024-03-21 RX ADMIN — HYDRALAZINE HYDROCHLORIDE 100 MG: 50 TABLET ORAL at 05:09

## 2024-03-21 RX ADMIN — FLUOXETINE 20 MG: 20 CAPSULE ORAL at 22:33

## 2024-03-21 RX ADMIN — BRIMONIDINE TARTRATE 1 DROP: 2 SOLUTION/ DROPS OPHTHALMIC at 20:40

## 2024-03-21 RX ADMIN — POTASSIUM CHLORIDE 40 MEQ: 1.5 POWDER, FOR SOLUTION ORAL at 15:43

## 2024-03-21 RX ADMIN — HEPARIN SODIUM 5000 UNITS: 5000 INJECTION INTRAVENOUS; SUBCUTANEOUS at 05:34

## 2024-03-21 RX ADMIN — WHITE PETROLATUM 57.7 %-MINERAL OIL 31.9 % EYE OINTMENT: at 05:30

## 2024-03-21 RX ADMIN — ARIPIPRAZOLE 5 MG: 10 TABLET ORAL at 08:43

## 2024-03-21 RX ADMIN — HYDRALAZINE HYDROCHLORIDE 100 MG: 50 TABLET ORAL at 22:33

## 2024-03-21 RX ADMIN — PANTOPRAZOLE SODIUM 40 MG: 40 INJECTION, POWDER, FOR SOLUTION INTRAVENOUS at 20:39

## 2024-03-21 RX ADMIN — Medication 20 MG: at 04:33

## 2024-03-21 RX ADMIN — HUMAN INSULIN 4 UNITS: 100 INJECTION, SOLUTION SUBCUTANEOUS at 00:30

## 2024-03-21 RX ADMIN — ALBUTEROL SULFATE 2.5 MG: 2.5 SOLUTION RESPIRATORY (INHALATION) at 01:01

## 2024-03-21 RX ADMIN — AMLODIPINE BESYLATE 10 MG: 10 TABLET ORAL at 08:43

## 2024-03-21 RX ADMIN — FLUOXETINE 20 MG: 20 CAPSULE ORAL at 10:41

## 2024-03-21 RX ADMIN — WHITE PETROLATUM 57.7 %-MINERAL OIL 31.9 % EYE OINTMENT 1 APPLICATION: at 14:42

## 2024-03-21 RX ADMIN — WHITE PETROLATUM 57.7 %-MINERAL OIL 31.9 % EYE OINTMENT: at 22:33

## 2024-03-21 RX ADMIN — TERAZOSIN HYDROCHLORIDE ANHYDROUS 5 MG: 5 CAPSULE ORAL at 20:39

## 2024-03-21 RX ADMIN — ACETAMINOPHEN 650 MG: 650 SOLUTION ORAL at 08:43

## 2024-03-21 RX ADMIN — ERTAPENEM 1000 MG: 1 INJECTION INTRAMUSCULAR; INTRAVENOUS at 16:11

## 2024-03-21 RX ADMIN — Medication 10 ML: at 09:23

## 2024-03-21 RX ADMIN — WHITE PETROLATUM 57.7 %-MINERAL OIL 31.9 % EYE OINTMENT: at 04:34

## 2024-03-21 RX ADMIN — HEPARIN SODIUM 5000 UNITS: 5000 INJECTION INTRAVENOUS; SUBCUTANEOUS at 14:41

## 2024-03-21 RX ADMIN — PANTOPRAZOLE SODIUM 40 MG: 40 INJECTION, POWDER, FOR SOLUTION INTRAVENOUS at 09:23

## 2024-03-21 RX ADMIN — INSULIN DETEMIR 5 UNITS: 100 INJECTION, SOLUTION SUBCUTANEOUS at 09:23

## 2024-03-21 RX ADMIN — 0.12% CHLORHEXIDINE GLUCONATE 15 ML: 1.2 RINSE ORAL at 08:43

## 2024-03-21 NOTE — PROGRESS NOTES
"   LOS: 11 days    Patient Care Team:  Deann Cao MD as PCP - General (Internal Medicine)    Consulted for: Acute on chronic renal failure, hyperkalemia  68-year-old CKD stage III admitted with shortness of breath, choking of food at NH, admitted with JAYY, hyperkalemia, acute respiratory failure now intubated, anemia.  Later developed oliguria with increasing BUN/creatinine.  Patient was treated with Lokelma, bicarb, dextrose, insulin, calcium gluconate.    Subjective     Intubated and sedated.   Not on pressors.         Review of Systems:    Review of systems could not be obtained due to  patient intubated. emergent nature of case.    Objective     Vital Sign Min/Max for last 24 hours  Temp  Min: 98.2 °F (36.8 °C)  Max: 99.9 °F (37.7 °C)   BP  Min: 125/53  Max: 161/62   Pulse  Min: 63  Max: 74   Resp  Min: 18  Max: 20   SpO2  Min: 91 %  Max: 98 %   No data recorded   Weight  Min: 99 kg (218 lb 4.1 oz)  Max: 99 kg (218 lb 4.1 oz)     Flowsheet Rows      Flowsheet Row First Filed Value   Admission Height 151.9 cm (59.8\") Documented at 03/10/2024 0910   Admission Weight 84.8 kg (187 lb) Documented at 03/10/2024 0910            No intake/output data recorded.  I/O last 3 completed shifts:  In: 3640.5 [I.V.:1014.5; Other:900; NG/GT:1726]  Out: 2825 [Urine:2825]    Physical Exam:  General Appearance: AA male critically ill remain on ventilator  Neck: Restricted due to intubation no JVD  Lungs: Ventilator sound heard, equal chest movement, nonlabored.  Heart: No gallop, murmur, rub, RRR.  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    : + Story catheter in place.     WBC WBC   Date Value Ref Range Status   03/21/2024 13.58 (H) 3.40 - 10.80 10*3/mm3 Final   03/20/2024 14.48 (H) 3.40 - 10.80 10*3/mm3 Final   03/19/2024 12.32 (H) 3.40 - 10.80 10*3/mm3 Final      HGB Hemoglobin " "  Date Value Ref Range Status   03/21/2024 8.6 (L) 13.0 - 17.7 g/dL Final   03/20/2024 8.7 (L) 13.0 - 17.7 g/dL Final   03/19/2024 8.8 (L) 13.0 - 17.7 g/dL Final      HCT Hematocrit   Date Value Ref Range Status   03/21/2024 28.2 (L) 37.5 - 51.0 % Final   03/20/2024 28.6 (L) 37.5 - 51.0 % Final   03/19/2024 29.1 (L) 37.5 - 51.0 % Final      Platlets No results found for: \"LABPLAT\"   MCV MCV   Date Value Ref Range Status   03/21/2024 91.0 79.0 - 97.0 fL Final   03/20/2024 91.4 79.0 - 97.0 fL Final   03/19/2024 90.9 79.0 - 97.0 fL Final          Sodium Sodium   Date Value Ref Range Status   03/21/2024 148 (H) 136 - 145 mmol/L Final   03/20/2024 143 136 - 145 mmol/L Final   03/19/2024 149 (H) 136 - 145 mmol/L Final      Potassium Potassium   Date Value Ref Range Status   03/21/2024 3.6 3.5 - 5.2 mmol/L Final   03/20/2024 3.5 3.5 - 5.2 mmol/L Final     Comment:     Slight hemolysis detected by analyzer. Result may be falsely elevated.   03/19/2024 4.0 3.5 - 5.2 mmol/L Final      Chloride Chloride   Date Value Ref Range Status   03/21/2024 114 (H) 98 - 107 mmol/L Final   03/20/2024 110 (H) 98 - 107 mmol/L Final   03/19/2024 115 (H) 98 - 107 mmol/L Final      CO2 CO2   Date Value Ref Range Status   03/21/2024 27.0 22.0 - 29.0 mmol/L Final   03/20/2024 26.0 22.0 - 29.0 mmol/L Final   03/19/2024 25.0 22.0 - 29.0 mmol/L Final      BUN BUN   Date Value Ref Range Status   03/21/2024 48 (H) 8 - 23 mg/dL Final   03/20/2024 55 (H) 8 - 23 mg/dL Final   03/19/2024 50 (H) 8 - 23 mg/dL Final      Creatinine Creatinine   Date Value Ref Range Status   03/21/2024 1.44 (H) 0.76 - 1.27 mg/dL Final   03/20/2024 1.70 (H) 0.76 - 1.27 mg/dL Final   03/19/2024 1.82 (H) 0.76 - 1.27 mg/dL Final      Calcium Calcium   Date Value Ref Range Status   03/21/2024 7.8 (L) 8.6 - 10.5 mg/dL Final   03/20/2024 8.4 (L) 8.6 - 10.5 mg/dL Final   03/19/2024 8.3 (L) 8.6 - 10.5 mg/dL Final      PO4 No results found for: \"CAPO4\"   Albumin No results found for: " "\"ALBUMIN\"     Magnesium Magnesium   Date Value Ref Range Status   03/21/2024 2.2 1.6 - 2.4 mg/dL Final   03/20/2024 2.4 1.6 - 2.4 mg/dL Final   03/19/2024 2.1 1.6 - 2.4 mg/dL Final        Uric Acid No results found for: \"URICACID\"        Results Review:     I reviewed the patient's new clinical results.    albuterol, 2.5 mg, Nebulization, Q6H - RT  amLODIPine, 10 mg, Nasogastric, Q24H  ARIPiprazole, 5 mg, Nasogastric, Daily  artificial tears, , Both Eyes, Q4H  brimonidine, 1 drop, Both Eyes, TID   And  timolol, 1 drop, Both Eyes, BID  carvedilol, 12.5 mg, Nasogastric, Q12H  chlorhexidine, 15 mL, Mouth/Throat, Q12H  cloNIDine, 0.2 mg, Nasogastric, Q8H  sennosides, 10 mL, Nasogastric, BID   And  docusate sodium, 100 mg, Nasogastric, BID  ertapenem, 1,000 mg, Intravenous, Q24H  FLUoxetine, 20 mg, Nasogastric, Q12H  heparin (porcine), 5,000 Units, Subcutaneous, Q8H  hydrALAZINE, 100 mg, Nasogastric, Q8H  insulin regular, 2-9 Units, Subcutaneous, Q6H  oxyCODONE, 10 mg, Nasogastric, Q6H  pantoprazole, 40 mg, Intravenous, Q12H  polyethylene glycol, 17 g, Nasogastric, BID  QUEtiapine, 12.5 mg, Nasogastric, Daily  QUEtiapine, 25 mg, Nasogastric, Nightly  sodium chloride, 10 mL, Intravenous, Q12H  terazosin, 5 mg, Nasogastric, Q12H  torsemide, 5 mg, Nasogastric, Daily      dexmedetomidine, 0.2-1.5 mcg/kg/hr (Order-Specific), Last Rate: 0.8 mcg/kg/hr (03/21/24 0544)  fentanyl 10 mcg/mL,  mcg/hr, Last Rate: Stopped (03/20/24 0842)        Medication Review: Reviewed    Results from last 7 days   Lab Units 03/21/24  0449 03/20/24  0424 03/19/24  0329 03/18/24  0322   SODIUM mmol/L 148* 143 149* 149*   POTASSIUM mmol/L 3.6 3.5 4.0 3.6   CHLORIDE mmol/L 114* 110* 115* 116*   CO2 mmol/L 27.0 26.0 25.0 23.0   BUN mg/dL 48* 55* 50* 48*   CREATININE mg/dL 1.44* 1.70* 1.82* 1.71*   CALCIUM mg/dL 7.8* 8.4* 8.3* 8.3*   WBC 10*3/mm3 13.58* 14.48* 12.32* 9.80   HEMOGLOBIN g/dL 8.6* 8.7* 8.8* 8.6*   PLATELETS 10*3/mm3 381 290 213 156 "             Assessment & Plan       Acute respiratory failure with hypoxemia    Essential hypertension    Neurocognitive disorder    AMS (altered mental status)    Dementia    Dysphagia    Iron deficiency anemia    Protein calorie malnutrition    Type 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  Acute renal failure on CKD stage III acute rise in creatinine with recent event.  Decreased renal perfusion, hypoxia, IV contrast.  2.  Hyperkalemia multifactorial due to JAYY, respiratory failure, acidosis.  3.  CKD stage III - Baseline Scr 1.6- 1.8.  CT abdomen showed normal-sized kidneys with no stones or hydronephrosis.ultrasound revealed right kidney 12.1 cm, left kidney 11.3 cm without hydronephrosis.   4.  Respite failure on ventilator CT angio negative for pulmonary embolism  5.  Essential hypertension  6.  Altered mental status  7.  Bradycardia  8.  Dementia     Recommendations:  - Continue free water.   - will give dose of metolazone - for edema and hypernatremia  - will replete K   - Keep MAP greater than 65 mmHg.  - Avoid nephrotoxic medications.  - Adjust medication according to new GFR.  - Continue  torsemide at 5 mg po q daily.     High risk complex patient multiple medical problems.      Farshad Jeong MD  03/21/24  08:40 EDT

## 2024-03-21 NOTE — PROGRESS NOTES
INTENSIVIST   PROGRESS NOTE     Hospital:  LOS: 11 days     Mr. Omkar Nava, 68 y.o. male is followed for a Chief Complaint of: Respiratory failure, pneumonia      Subjective   S     Interval History:  No acute events. Tolerated pressure support for 3 hours.        The patient's relevant past medical, surgical and social history were reviewed and updated in Epic as appropriate.      ROS: Unable to obtain secondary to sedation and mechanical ventilation.     Objective   O     Vitals:  Temp  Min: 98.2 °F (36.8 °C)  Max: 99.9 °F (37.7 °C)  BP  Min: 129/53  Max: 161/62  Pulse  Min: 60  Max: 74  Resp  Min: 17  Max: 20  SpO2  Min: 91 %  Max: 98 % No data recorded    Intake/Ouptut 24 hrs (7:00AM - 6:59 AM)  Intake & Output (last 3 days)         03/15 0701  03/16 0700 03/16 0701  03/17 0700 03/17 0701  03/18 0700 03/18 0701 03/19 0700    I.V. (mL/kg) 1860.5 (20.4) 836.4 (8.5) 2531.7 (25.6) 398.6 (4)    Blood        Other 181 193 321 170    NG/ 637 722 243    IV Piggyback 52.7  278.5     Total Intake(mL/kg) 2652.2 (29.1) 1666.4 (16.9) 3853.2 (39) 811.6 (8.2)    Urine (mL/kg/hr) 3765 (1.7) 1815 (0.8) 1475 (0.6) 325 (0.4)    Stool   0     Total Output 3765 1815 1475 325    Net -1112.8 -148.6 +2378.2 +486.6            Stool Unmeasured Occurrence   3 x             Medications (drips):  dexmedetomidine, Last Rate: 1 mcg/kg/hr (03/21/24 1510)        Mechanical Ventilator Settings:          Resp Rate (Set): 18  Pressure Support (cm H2O): 10 cm H20  FiO2 (%): 45 %  PEEP/CPAP (cm H2O): 5 cm H20    Minute Ventilation (L/min) (Obs): 7.12 L/min  Resp Rate (Observed) Vent: 18  I:E Ratio (Set): 1:2.33  I:E Ratio (Obs): 1:2.3    PIP Observed (cm H2O): 22 cm H2O  Plateau Pressure (cm H2O): (S) 17 cm H2O    Physical Examination  Telemetry:  Normal sinus rhythm.    Constitutional:  No acute distress.  ETT in place on mechanical ventilation.    Eyes: No scleral icterus.   PERRL, EOM intact.    Neck:  Supple, FROM   Cardiovascular:  Normal rate, regular and rhythm. Normal heart sounds.  No murmurs, gallop or rub.   Respiratory: No respiratory distress. Normal respiratory effort.  Diminished. No wheezing.    Abdominal:  Soft. No masses. Nontender. No distension. No HSM.   Extremities: No digital cyanosis. No clubbing.  No peripheral edema.   Skin: No rashes, lesions or ulcers   Neurological:   Sedated. Moves extremities to stimulation. Does not follow commands. Mild agitation.              Results from last 7 days   Lab Units 03/21/24 0449 03/20/24 0424 03/19/24  0329   WBC 10*3/mm3 13.58* 14.48* 12.32*   HEMOGLOBIN g/dL 8.6* 8.7* 8.8*   MCV fL 91.0 91.4 90.9   PLATELETS 10*3/mm3 381 290 213     Results from last 7 days   Lab Units 03/21/24 0449 03/20/24 0424 03/19/24 0329   SODIUM mmol/L 148* 143 149*   POTASSIUM mmol/L 3.6 3.5 4.0   CO2 mmol/L 27.0 26.0 25.0   CREATININE mg/dL 1.44* 1.70* 1.82*   GLUCOSE mg/dL 232* 211* 201*   MAGNESIUM mg/dL 2.2 2.4 2.1   PHOSPHORUS mg/dL 2.3* 2.0* 2.4*     Estimated Creatinine Clearance: 51 mL/min (A) (by C-G formula based on SCr of 1.44 mg/dL (H)).          Results from last 7 days   Lab Units 03/20/24  0343 03/19/24  0427 03/18/24  0426 03/17/24  0315 03/16/24  0447   PH, ARTERIAL pH units 7.441 7.363 7.387 7.406 7.396   PCO2, ARTERIAL mm Hg 39.1 46.8* 44.2 44.6 45.7*   PO2 ART mm Hg 77.6* 61.2* 58.7* 106.0 82.8*   FIO2 % 50 45 50 50 40       Images:  Imaging Results (Last 24 Hours)       Procedure Component Value Units Date/Time    XR Chest 1 View [[id number]] Collected: 03/21/24 0628     Updated: 03/21/24 0632    Narrative:      XR CHEST 1 VW    Date of Exam: 3/21/2024 3:17 AM EDT    Indication: to confirm placement of ET Tube -Manual Prone Protocol    Comparison: 1 day prior.    Findings:  Support hardware projects unchanged. Aeration appears similar with hazy diffuse opacities and small layering effusion on the left. There is no distinct new focal airspace consolidation or pneumothorax. Unchanged  heart and mediastinal contours.      Impression:      Impression:  No significant interval change.      Electronically Signed: Boom Rodriguez MD    3/21/2024 6:29 AM EDT    Workstation ID: [id number]               Results: Reviewed.  I reviewed the patient's new laboratory and imaging results.  I independently reviewed the patient's new images.    Medications: Reviewed.    Assessment & Plan   A / P     Mr. Nava is a 68-year-old gentleman with diabetes mellitus complicated by blindness, osteomyelitis, chronic kidney disease, underlying dementia with psychosis, dependent for mobility, bathing and dressing, chronic anemia, heart failure with normal EF who is now admitted for his third admission in the last 2.5 months for an aspiration event with aspiration pneumonia.  On his previous admission a modified barium swallow did reveal moderate oral and mild pharyngeal dysphagia February 5, 2024.  Respiratory status deteriorated overnight on March 10 and he required intubation.  He initially was requiring 35% oxygen over March 13, 14th oxygenation worsened and he required 80% FiO2.  CTA of the chest was repeated and was negative for PE but revealed worsening bilateral pneumonia.  He developed abdominal distention.  CTA of the abdomen revealed no bowel obstruction or ischemic bowel.       Respiratory cultures are growing ESBL Klebsiella and antibiotics were changed to Ertapenem on 3/16.     Fentanyl turned off on 3/20.     Tolerated pressure support trial x 3 hours this AM.     Nutrition:   NPO Diet NPO Type: Strict NPO  Advance Directives:   Code Status and Medical Interventions:   Ordered at: 03/10/24 1302     Code Status (Patient has no pulse and is not breathing):    CPR (Attempt to Resuscitate)     Medical Interventions (Patient has pulse or is breathing):    Full Support       Active Hospital Problems    Diagnosis     **Acute respiratory failure with hypoxemia     Healthcare-associated pneumonia     Acute kidney injury  superimposed on chronic kidney disease     AMS (altered mental status)     Dysphagia     Dementia     Protein calorie malnutrition     Neurocognitive disorder     Essential hypertension     Type 2 diabetes mellitus with diabetic chronic kidney disease     Iron deficiency anemia        Assessment / Plan:    Continue mechanical ventilation. Lasted 3 hours on pressure support this AM.   Continue Precedex. Hold Fentanyl.   Increase Seroquel.   Continue bowel regimen  Continue tube feeds.   Ertapenem for pneumonia.   Nephrology following for JAYY.   Hold bowel regimen secondary to diarrhea.   AM labs and CXR.     Discussed with the patient's daughter regarding goals of care this AM. I have asked her to think about whether or not the patient would want a tracheostomy. I have explained that in the event she does not think that he would want a tracheostomy, then we should extubate him when appropriate but plan not to reintubate if he fails. She will discuss further goals of care with other family members.     High risk secondary to management of mechanical ventilation.     High level of risk due to:  severe exacerbation of chronic illness and illness with threat to life or bodily function.    Plan of care and goals reviewed during interdisciplinary rounds.  I discussed the patient's findings and my recommendations with family and nursing staff      Radha Wetzel, DO    Intensive Care Medicine and Pulmonary Medicine

## 2024-03-21 NOTE — PLAN OF CARE
Goal Outcome Evaluation:              Outcome Evaluation: Pt remains on the ventilator acvc with fio2 at 45%. Sats 92-99%/ Not following commands or having purposeful movements. Roving eyes. HR 50-80 Siinus Rhythm/Sinus Jim. Tolerating Tube feeding Novasource Renal at 40ml/hr. Weaned Precedex to 0.8mg/hr. Adequate UOP.

## 2024-03-21 NOTE — PROGRESS NOTES
Multidisciplinary Rounds EN Review Note    Patient Name: Omkar Nava  Date of Encounter: 24 13:29 EDT  MRN: [id number]  Admission date: 3/10/2024    Reason for visit: EN review . RD to continue to follow per protocol.     EMR reviewed   Medication reviewed precedex,   Labs reviewed Phos 2.3, Creatinine 1.44, BUN 48    Estimated/Assessed Needs (3/19):      Energy: 1600 kcal while on vent  Protein: 76 g with current renal fx  Fluids: per clinical status    Additional Information Obtained:   Tolerating EN at goal. Bowels moving. NG replaced with NJ.     Current diet: NPO Diet NPO Type: Strict NPO    EN: NovaSource Renal  Goal Rate: 40 ml/hr    Water Flushes: 30 ml/hr  Modular: None  Route: NJ  Tube: Small bore    At goal over: 20Hrs/day    Rx will supply:   Goal Volume 800 mL/day     Flush Volume 600 mL/day     Energy 1600 Kcal/day 100 % Est Need   Protein 73 g/day 96 % Est Need   Fiber 0 g/day     Water in   mL     Total Water 1176 mL     Meet DRI No        --------------------------------------------------------------------------  Product/Rate verified at bedside: Yes   Infusing Rate at time of visit: 40 ml/hr    Average Delivery from [facility]:(on hold)  Volume  mL/day   % Goal Vol.   Flush Volume  mL/day     Energy  Kcal/day  % Est Need   Protein  g/day  % Est Need   Fiber  g/day     Water in  EN  mL     Total Water  mL     Meet DRI No          Intervention:  Follow treatment plan  Care plan reviewed    Continue current EN regimen per order / as tolerated    Follow up:   Per protocol      Chuyita Moreno RD, [facility]  13:35 EDT  Time: 15min

## 2024-03-22 ENCOUNTER — APPOINTMENT (OUTPATIENT)
Dept: GENERAL RADIOLOGY | Facility: HOSPITAL | Age: 69
End: 2024-03-22
Payer: MEDICARE

## 2024-03-22 ENCOUNTER — APPOINTMENT (OUTPATIENT)
Dept: PULMONOLOGY | Facility: HOSPITAL | Age: 69
End: 2024-03-22
Payer: MEDICARE

## 2024-03-22 LAB
ANION GAP SERPL CALCULATED.3IONS-SCNC: 8 MMOL/L (ref 5–15)
BUN SERPL-MCNC: 38 MG/DL (ref 8–23)
BUN/CREAT SERPL: 33 (ref 7–25)
CALCIUM SPEC-SCNC: 7.6 MG/DL (ref 8.6–10.5)
CHLORIDE SERPL-SCNC: 115 MMOL/L (ref 98–107)
CO2 SERPL-SCNC: 24 MMOL/L (ref 22–29)
CREAT SERPL-MCNC: 1.15 MG/DL (ref 0.76–1.27)
DEPRECATED RDW RBC AUTO: 53 FL (ref 37–54)
EGFRCR SERPLBLD CKD-EPI 2021: 70.2 ML/MIN/1.73
ERYTHROCYTE [DISTWIDTH] IN BLOOD BY AUTOMATED COUNT: 16 % (ref 12.3–15.4)
GLUCOSE BLDC GLUCOMTR-MCNC: 154 MG/DL (ref 70–130)
GLUCOSE BLDC GLUCOMTR-MCNC: 173 MG/DL (ref 70–130)
GLUCOSE BLDC GLUCOMTR-MCNC: 184 MG/DL (ref 70–130)
GLUCOSE BLDC GLUCOMTR-MCNC: 207 MG/DL (ref 70–130)
GLUCOSE BLDC GLUCOMTR-MCNC: 208 MG/DL (ref 70–130)
GLUCOSE SERPL-MCNC: 150 MG/DL (ref 65–99)
HCT VFR BLD AUTO: 26.7 % (ref 37.5–51)
HGB BLD-MCNC: 8.2 G/DL (ref 13–17.7)
MAGNESIUM SERPL-MCNC: 2 MG/DL (ref 1.6–2.4)
MCH RBC QN AUTO: 28 PG (ref 26.6–33)
MCHC RBC AUTO-ENTMCNC: 30.7 G/DL (ref 31.5–35.7)
MCV RBC AUTO: 91.1 FL (ref 79–97)
PHOSPHATE SERPL-MCNC: 1.9 MG/DL (ref 2.5–4.5)
PHOSPHATE SERPL-MCNC: 2.5 MG/DL (ref 2.5–4.5)
PLATELET # BLD AUTO: 414 10*3/MM3 (ref 140–450)
PMV BLD AUTO: 12.2 FL (ref 6–12)
POTASSIUM SERPL-SCNC: 3.4 MMOL/L (ref 3.5–5.2)
POTASSIUM SERPL-SCNC: 4.5 MMOL/L (ref 3.5–5.2)
RBC # BLD AUTO: 2.93 10*6/MM3 (ref 4.14–5.8)
SODIUM SERPL-SCNC: 147 MMOL/L (ref 136–145)
WBC NRBC COR # BLD AUTO: 10.73 10*3/MM3 (ref 3.4–10.8)

## 2024-03-22 PROCEDURE — 63710000001 INSULIN DETEMIR PER 5 UNITS: Performed by: INTERNAL MEDICINE

## 2024-03-22 PROCEDURE — 94690 O2 UPTK REST INDIRECT: CPT

## 2024-03-22 PROCEDURE — 80048 BASIC METABOLIC PNL TOTAL CA: CPT | Performed by: INTERNAL MEDICINE

## 2024-03-22 PROCEDURE — 85027 COMPLETE CBC AUTOMATED: CPT | Performed by: INTERNAL MEDICINE

## 2024-03-22 PROCEDURE — 99232 SBSQ HOSP IP/OBS MODERATE 35: CPT | Performed by: INTERNAL MEDICINE

## 2024-03-22 PROCEDURE — 84100 ASSAY OF PHOSPHORUS: CPT | Performed by: INTERNAL MEDICINE

## 2024-03-22 PROCEDURE — 71045 X-RAY EXAM CHEST 1 VIEW: CPT

## 2024-03-22 PROCEDURE — 25010000002 ERTAPENEM PER 500 MG: Performed by: INTERNAL MEDICINE

## 2024-03-22 PROCEDURE — 94003 VENT MGMT INPAT SUBQ DAY: CPT

## 2024-03-22 PROCEDURE — 83735 ASSAY OF MAGNESIUM: CPT | Performed by: INTERNAL MEDICINE

## 2024-03-22 PROCEDURE — 94799 UNLISTED PULMONARY SVC/PX: CPT

## 2024-03-22 PROCEDURE — 63710000001 INSULIN REGULAR HUMAN PER 5 UNITS: Performed by: INTERNAL MEDICINE

## 2024-03-22 PROCEDURE — 94761 N-INVAS EAR/PLS OXIMETRY MLT: CPT

## 2024-03-22 PROCEDURE — 82948 REAGENT STRIP/BLOOD GLUCOSE: CPT

## 2024-03-22 PROCEDURE — 25010000002 HEPARIN (PORCINE) PER 1000 UNITS: Performed by: INTERNAL MEDICINE

## 2024-03-22 PROCEDURE — 94664 DEMO&/EVAL PT USE INHALER: CPT

## 2024-03-22 PROCEDURE — 84132 ASSAY OF SERUM POTASSIUM: CPT | Performed by: INTERNAL MEDICINE

## 2024-03-22 RX ORDER — METOLAZONE 5 MG/1
2.5 TABLET ORAL ONCE
Status: COMPLETED | OUTPATIENT
Start: 2024-03-22 | End: 2024-03-22

## 2024-03-22 RX ORDER — POTASSIUM CHLORIDE 1.5 G/1.58G
40 POWDER, FOR SOLUTION ORAL EVERY 4 HOURS
Status: COMPLETED | OUTPATIENT
Start: 2024-03-22 | End: 2024-03-22

## 2024-03-22 RX ORDER — POLYETHYLENE GLYCOL 3350 17 G/17G
17 POWDER, FOR SOLUTION ORAL DAILY PRN
Status: DISCONTINUED | OUTPATIENT
Start: 2024-03-22 | End: 2024-03-23

## 2024-03-22 RX ORDER — AMINO ACIDS/PROTEIN HYDROLYS 11G-40/45
30 LIQUID IN PACKET (ML) ORAL DAILY
Status: DISCONTINUED | OUTPATIENT
Start: 2024-03-23 | End: 2024-03-25

## 2024-03-22 RX ORDER — GUAR GUM
2 PACKET (EA) ORAL 2 TIMES DAILY
Status: DISCONTINUED | OUTPATIENT
Start: 2024-03-22 | End: 2024-03-23

## 2024-03-22 RX ORDER — POTASSIUM CHLORIDE 750 MG/1
30 CAPSULE, EXTENDED RELEASE ORAL ONCE
Status: DISCONTINUED | OUTPATIENT
Start: 2024-03-22 | End: 2024-03-22 | Stop reason: SDUPTHER

## 2024-03-22 RX ADMIN — CARVEDILOL 12.5 MG: 12.5 TABLET, FILM COATED ORAL at 08:30

## 2024-03-22 RX ADMIN — CLONIDINE HYDROCHLORIDE 0.2 MG: 0.2 TABLET ORAL at 14:26

## 2024-03-22 RX ADMIN — OXYCODONE HYDROCHLORIDE 10 MG: 10 TABLET ORAL at 18:34

## 2024-03-22 RX ADMIN — 0.12% CHLORHEXIDINE GLUCONATE 15 ML: 1.2 RINSE ORAL at 08:30

## 2024-03-22 RX ADMIN — FLUOXETINE 20 MG: 20 CAPSULE ORAL at 10:27

## 2024-03-22 RX ADMIN — PANTOPRAZOLE SODIUM 40 MG: 40 INJECTION, POWDER, FOR SOLUTION INTRAVENOUS at 21:04

## 2024-03-22 RX ADMIN — WHITE PETROLATUM 57.7 %-MINERAL OIL 31.9 % EYE OINTMENT: at 18:34

## 2024-03-22 RX ADMIN — HUMAN INSULIN 4 UNITS: 100 INJECTION, SOLUTION SUBCUTANEOUS at 18:34

## 2024-03-22 RX ADMIN — WHITE PETROLATUM 57.7 %-MINERAL OIL 31.9 % EYE OINTMENT: at 14:25

## 2024-03-22 RX ADMIN — BRIMONIDINE TARTRATE 1 DROP: 2 SOLUTION/ DROPS OPHTHALMIC at 08:30

## 2024-03-22 RX ADMIN — OXYCODONE HYDROCHLORIDE 10 MG: 10 TABLET ORAL at 12:31

## 2024-03-22 RX ADMIN — ARIPIPRAZOLE 5 MG: 10 TABLET ORAL at 08:29

## 2024-03-22 RX ADMIN — HUMAN INSULIN 2 UNITS: 100 INJECTION, SOLUTION SUBCUTANEOUS at 12:31

## 2024-03-22 RX ADMIN — TERAZOSIN HYDROCHLORIDE ANHYDROUS 5 MG: 5 CAPSULE ORAL at 08:29

## 2024-03-22 RX ADMIN — POTASSIUM PHOSPHATE, MONOBASIC POTASSIUM PHOSPHATE, DIBASIC 15 MMOL: 224; 236 INJECTION, SOLUTION, CONCENTRATE INTRAVENOUS at 11:26

## 2024-03-22 RX ADMIN — OXYCODONE HYDROCHLORIDE 10 MG: 10 TABLET ORAL at 05:39

## 2024-03-22 RX ADMIN — HUMAN INSULIN 2 UNITS: 100 INJECTION, SOLUTION SUBCUTANEOUS at 23:16

## 2024-03-22 RX ADMIN — WHITE PETROLATUM 57.7 %-MINERAL OIL 31.9 % EYE OINTMENT: at 04:46

## 2024-03-22 RX ADMIN — OXYCODONE HYDROCHLORIDE 10 MG: 10 TABLET ORAL at 23:16

## 2024-03-22 RX ADMIN — TERAZOSIN HYDROCHLORIDE ANHYDROUS 5 MG: 5 CAPSULE ORAL at 21:09

## 2024-03-22 RX ADMIN — HUMAN INSULIN 2 UNITS: 100 INJECTION, SOLUTION SUBCUTANEOUS at 05:39

## 2024-03-22 RX ADMIN — POTASSIUM CHLORIDE 40 MEQ: 1.5 POWDER, FOR SOLUTION ORAL at 14:25

## 2024-03-22 RX ADMIN — HYDRALAZINE HYDROCHLORIDE 100 MG: 50 TABLET ORAL at 21:09

## 2024-03-22 RX ADMIN — ALBUTEROL SULFATE 2.5 MG: 2.5 SOLUTION RESPIRATORY (INHALATION) at 13:58

## 2024-03-22 RX ADMIN — Medication 10 ML: at 21:04

## 2024-03-22 RX ADMIN — DEXMEDETOMIDINE HYDROCHLORIDE 0.8 MCG/KG/HR: 400 INJECTION, SOLUTION INTRAVENOUS at 04:45

## 2024-03-22 RX ADMIN — WHITE PETROLATUM 57.7 %-MINERAL OIL 31.9 % EYE OINTMENT: at 23:00

## 2024-03-22 RX ADMIN — FLUOXETINE 20 MG: 20 CAPSULE ORAL at 23:00

## 2024-03-22 RX ADMIN — 0.12% CHLORHEXIDINE GLUCONATE 15 ML: 1.2 RINSE ORAL at 21:10

## 2024-03-22 RX ADMIN — METOLAZONE 2.5 MG: 5 TABLET ORAL at 12:32

## 2024-03-22 RX ADMIN — INSULIN DETEMIR 5 UNITS: 100 INJECTION, SOLUTION SUBCUTANEOUS at 08:30

## 2024-03-22 RX ADMIN — HYDRALAZINE HYDROCHLORIDE 100 MG: 50 TABLET ORAL at 14:26

## 2024-03-22 RX ADMIN — HEPARIN SODIUM 5000 UNITS: 5000 INJECTION INTRAVENOUS; SUBCUTANEOUS at 21:09

## 2024-03-22 RX ADMIN — CLONIDINE HYDROCHLORIDE 0.2 MG: 0.2 TABLET ORAL at 05:39

## 2024-03-22 RX ADMIN — TIMOLOL MALEATE 1 DROP: 5 SOLUTION/ DROPS OPHTHALMIC at 08:30

## 2024-03-22 RX ADMIN — POTASSIUM CHLORIDE 40 MEQ: 1.5 POWDER, FOR SOLUTION ORAL at 10:26

## 2024-03-22 RX ADMIN — BRIMONIDINE TARTRATE 1 DROP: 2 SOLUTION/ DROPS OPHTHALMIC at 16:55

## 2024-03-22 RX ADMIN — WHITE PETROLATUM 57.7 %-MINERAL OIL 31.9 % EYE OINTMENT: at 10:27

## 2024-03-22 RX ADMIN — HEPARIN SODIUM 5000 UNITS: 5000 INJECTION INTRAVENOUS; SUBCUTANEOUS at 05:40

## 2024-03-22 RX ADMIN — DEXMEDETOMIDINE HYDROCHLORIDE 0.63 MCG/KG/HR: 400 INJECTION, SOLUTION INTRAVENOUS at 10:26

## 2024-03-22 RX ADMIN — WHITE PETROLATUM 57.7 %-MINERAL OIL 31.9 % EYE OINTMENT: at 05:41

## 2024-03-22 RX ADMIN — INSULIN DETEMIR 5 UNITS: 100 INJECTION, SOLUTION SUBCUTANEOUS at 21:49

## 2024-03-22 RX ADMIN — AMLODIPINE BESYLATE 10 MG: 10 TABLET ORAL at 08:29

## 2024-03-22 RX ADMIN — QUETIAPINE FUMARATE 25 MG: 25 TABLET ORAL at 08:29

## 2024-03-22 RX ADMIN — TORSEMIDE 5 MG: 10 TABLET ORAL at 08:30

## 2024-03-22 RX ADMIN — DEXMEDETOMIDINE HYDROCHLORIDE 0.8 MCG/KG/HR: 400 INJECTION, SOLUTION INTRAVENOUS at 15:47

## 2024-03-22 RX ADMIN — HEPARIN SODIUM 5000 UNITS: 5000 INJECTION INTRAVENOUS; SUBCUTANEOUS at 14:26

## 2024-03-22 RX ADMIN — PANTOPRAZOLE SODIUM 40 MG: 40 INJECTION, POWDER, FOR SOLUTION INTRAVENOUS at 08:34

## 2024-03-22 RX ADMIN — BRIMONIDINE TARTRATE 1 DROP: 2 SOLUTION/ DROPS OPHTHALMIC at 21:27

## 2024-03-22 RX ADMIN — TIMOLOL MALEATE 1 DROP: 5 SOLUTION/ DROPS OPHTHALMIC at 21:27

## 2024-03-22 RX ADMIN — ALBUTEROL SULFATE 2.5 MG: 2.5 SOLUTION RESPIRATORY (INHALATION) at 19:22

## 2024-03-22 RX ADMIN — HYDRALAZINE HYDROCHLORIDE 100 MG: 50 TABLET ORAL at 05:39

## 2024-03-22 RX ADMIN — DEXMEDETOMIDINE HYDROCHLORIDE 0.8 MCG/KG/HR: 400 INJECTION, SOLUTION INTRAVENOUS at 21:03

## 2024-03-22 RX ADMIN — CARVEDILOL 12.5 MG: 12.5 TABLET, FILM COATED ORAL at 21:10

## 2024-03-22 RX ADMIN — Medication 10 ML: at 08:44

## 2024-03-22 RX ADMIN — ALBUTEROL SULFATE 2.5 MG: 2.5 SOLUTION RESPIRATORY (INHALATION) at 01:16

## 2024-03-22 RX ADMIN — ALBUTEROL SULFATE 2.5 MG: 2.5 SOLUTION RESPIRATORY (INHALATION) at 07:49

## 2024-03-22 RX ADMIN — CLONIDINE HYDROCHLORIDE 0.2 MG: 0.2 TABLET ORAL at 21:10

## 2024-03-22 RX ADMIN — QUETIAPINE FUMARATE 25 MG: 25 TABLET ORAL at 21:10

## 2024-03-22 RX ADMIN — ERTAPENEM 1000 MG: 1 INJECTION INTRAMUSCULAR; INTRAVENOUS at 17:00

## 2024-03-22 NOTE — PROGRESS NOTES
Clinical Nutrition     Nutrition Support Assessment  Reason for Visit: Follow-up protocol, EN    Patient Name: Omkar Nava  YOB: 1955  MRN: [id number]  Date of Encounter: 03/22/24 10:18 EDT  Admission date: 3/10/2024    Tolerating EN, continue per order:    +prosource to better meet needs as renal fx improving.   +fiber to assist with stool formation.     Novasource Renal at goal rate 40 ml/hr, water flushes 30 ml/hr. Provide 1 Prosource 1x/day and 2 Nutrisource Fiber packets 2x/day    Rx will supply:   Goal Volume 800 mL/day       Flush Volume 600 mL/day       Energy 1660 Kcal/day 104 % Est Need   Protein 88 g/day 104 % Est Need   Fiber 12 g/day       Water in   mL       Total Water 1176 mL       Meet DRI No           Nutrition Assessment   Admission Diagnosis:  Healthcare-associated pneumonia [J18.9]  Acute respiratory failure with hypoxemia [J96.01]      Problem List:    Acute respiratory failure with hypoxemia    Essential hypertension    Neurocognitive disorder    AMS (altered mental status)    Dementia    Dysphagia    Iron deficiency anemia    Protein calorie malnutrition    Type 2 diabetes mellitus with diabetic chronic kidney disease    Healthcare-associated pneumonia    Acute kidney injury superimposed on chronic kidney disease        PMH:   He  has a past medical history of Anemia, Dementia, Diabetes mellitus, Dysphagia, GERD (gastroesophageal reflux disease), History of alcohol abuse, History of cocaine use, History of marijuana use, Hypertension, Osteomyelitis, Poor historian, and Visual impairment.    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      Applicable Nutrition Concerns:   Skin:  Oral: history of dysphagia - came in with chocking incident   GI: came with constipation; improved     Applicable Interval History:    (3/10) Intubated   (3/11) EN started - Novasource Renal   (3/20) NG replaced with NJ    Reported/Observed/Food/Nutrition Related History:     3/22) Tolerating EN. Phos dropping past 3 days, critically low this am and to be replaced, K low as well. Creatinine WNL today. Suspect low lytes 2/2 renal formula, however hesitant to change at this time given prolonged poor renal fx in setting of renal disease. Will add prosource to better meet protein needs and may assist with lytes as well. If no improvement in next 72 hr, will consider formula change. Pt now with FMS, will add some fiber for stool formation. Documented weights continue to be higher than suspected actual, several liters positive. Ordered IC.     3/21) Tolerating EN at goal. Bowels moving. NG replaced with NJ.      3/19) Pt tolerating EN without issue. Remains intubated/sedated. Bowels moving. Renal fx remains poor, nephrology following and increased water flushes over the weekend. Still hypernatremic, will increase further.     3/15) Pt with abd. distention yesterday and EN was held. Imagining showed mild excess fluid in bowel and stomach, stool burden. No BM yet, receiving senokot, colace, and miralax. Gastroenterology placed NJ in EGD yesterday. Discussed in MDR, Intensivist okay with restarting slow/low. Pt with hypoglycemia while EN was on hold and D20 infusion added, discussed plan to stop this as EN advances to goal. Renal fx labs slightly worsened today. O2 sats improved and no current plans for proning pt.      3/14) Remains intubated, off sedation.  Overall improved renal status.  Patient was taken to CT of abdomen this morning; not clear reason, no documentation of any GI issues.    Discussed with RN caring for patient today. Reports patient was starting to have abdominal distention yesterday, EN was turned off; turned back on at some point. She noted patient with increased distention this morning which was not the case yesterday.  CT of  "abdomen and KUB results noted. It seem patient is still with significant stool burden.  RN giving ordered bowel regiment now - not given yesterday per MAR records?     3/11) Pt presents in ARF, intubated/sedated. Intensivist consulted for EN and has already placed order for Novasource which RD concurs with. Pt from NH and came to ED after choking incident. Pt with severe dementia and aggression. Advanced chronic diseases HF, CKD, DM, dysphagia. Pt is blind.   Pt has NG. Did have some emesis on POA, KUB showed stool burden, pt now has had several BMs. No further emesis. Pt with significantly high Potassium on POA, improved with several doses lokelma to 6.0 today. Renal fx labs poor, A/C renal failure per Nephrology. Pt was receiving propofol but this was stopped, plan to transition to precedex and fentanyl. Pt was also receiving NS @ 100 ml/hr which was d/c this am. No current vasopressor support.     Labs    Labs Reviewed: Yes     Results from last 7 days   Lab Units 03/22/24  0424 03/21/24  0449 03/20/24  0424   GLUCOSE mg/dL 150* 232* 211*   BUN mg/dL 38* 48* 55*   CREATININE mg/dL 1.15 1.44* 1.70*   SODIUM mmol/L 147* 148* 143   CHLORIDE mmol/L 115* 114* 110*   POTASSIUM mmol/L 3.4* 3.6 3.5   PHOSPHORUS mg/dL 1.9* 2.3* 2.0*   MAGNESIUM mg/dL 2.0 2.2 2.4             Invalid input(s): \"PLAT\"      Results from last 7 days   Lab Units 03/22/24  0516 03/21/24  2303 03/21/24  1740 03/21/24  1138 03/21/24  0524 03/20/24  2329   GLUCOSE mg/dL 154* 173* 213* 221* 264* 229*     Lab Results   Lab Value Date/Time    HGBA1C 7.00 (H) 10/16/2022 0432    HGBA1C 8.7 (H) 06/25/2022 0242    HGBA1C 13.4 04/14/2022 1058                   Medications    Medications Reviewed: Yes  Pertinent: colace, levemir 5units Q12hrs, insulin 2 units Q6hrs, Protonix, Miralax, senokot  Infusion: precedex, fentanyl  PRN:     Intake/Ouptut 24 hrs (0701 - 0700)   I&O's Reviewed: Yes   Urine: 2586ml  Stool: No documented BM yesterday; lat BM " "3/13    Anthropometrics     Height: Height: 151.9 cm (59.8\")  Last Filed Weight: Weight: 98.2 kg (216 lb 7.9 oz) (03/22/24 0600)  Method: Weight Method: Bed scale  BMI: BMI (Calculated): 42.6  BMI classification: Obese Class II: 35-39.9kg/m2  IBW:      UBW: fluctuates 2/2 CKD ~180-200 lb since 2022   Weight       Weight (kg) Weight (lbs) Weight Method Visit Report   8/9/2023 88.905 kg  196 lb  Bed scale     1/15/2024 93.441 kg  206 lb  Estimated     1/16/2024 90.855 kg  200 lb 4.8 oz  Bed scale      90 kg  198 lb 6.6 oz      1/17/2024 95.21 kg  209 lb 14.4 oz      1/18/2024 85.548 kg  188 lb 9.6 oz  Bed scale     1/19/2024 82.691 kg  182 lb 4.8 oz  Bed scale     1/20/2024 83.054 kg  183 lb 1.6 oz  Bed scale     1/21/2024 85.14 kg  187 lb 11.2 oz  Bed scale     2/5/2024 85.1 kg  187 lb 9.8 oz  Estimated     2/22/2024 84.823 kg  187 lb   --    3/10/2024 84.823 kg  187 lb  Stated      98.1 kg  216 lb 4.3 oz  Bed scale     3/11/2024 90 kg  198 lb 6.6 oz  Bed scale       Weight change: no significant changes - suspect currently holding fluid    Nutrition Focused Physical Exam     Date: 3/11, 3/22    Unable to perform exam due to: Pt unable to participate at time of visit, Defer pending indication    Needs Assessment   Date: 3/11, reassessed 3/19, 3/22    Height used:Height: 151.9 cm (59.8\")  Weights used: 85 kg / 187 lb (ABW), 53 kg / 117 lb (IBW)  *suspected dry weight likely ~185 lb given previously documented including recent MDOV weight. Likely currently holding fluid, will continue to monitor.    Estimated Calorie needs: ~1600 Kcal/day while on vent  Method:  Kcals/KG 15-20 = 8914-2811  Method:  PSU (3/22 Tmax-37.3, Ve-6.6) = 1555    Estimated Protein needs: ~85 g PRO/day with current renal fx  Method: .8-1 g/Kg ABW = 68-85  Method: 2 g/Kg IBW = 106    Estimated Fluid needs: ~ ml/day   Per clinical status    Current Nutrition Prescription     PO: NPO Diet NPO Type: Strict NPO  Oral Nutrition Supplement:   Intake: " N/A    EN: NovaSource Renal  Goal Rate: 40ml/hr   Water Flushes: 30ml/hr  Modular: None  Route: NG  Tube: Large bore    At goal over: 20Hrs/day    Rx will supply:   Goal Volume 800 mL/day       Flush Volume 600 mL/day       Energy 1600 Kcal/day 100 % Est Need   Protein 73 g/day 96 % Est Need   Fiber 0 g/day       Water in   mL       Total Water 1176 mL       Meet DRI No           --------------------------------------------------------------------------  Product/Rate verified at bedside: Yes   Infusing Rate at time of visit: 40 ml/hr    Average Delivery from Charting: 3 Days:  Volume 830 mL/day 104  % Goal Vol.   Flush Volume 633 mL/day     Energy  Kcal/day  % Est Need   Protein  g/day  % Est Need   Fiber  g/day     Water in  EN  mL     Total Water  mL     Meet DRI No          Nutrition Diagnosis   Date: 3/11  Updated: 3/14  Problem Inadequate oral intake   Etiology ARF requiring mechanical ventilation   Signs/Symptoms Intubated/sedated, NPO   Status: Receiving EN    Date: 3/11  Updated: 3/14  Problem Altered nutrition related laboratory values   Etiology A/C Renal Failure   Signs/Symptoms Creatinine 2.02, BUN 57, K 6.0   Status: Improving     Goal:   General: Nutrition to support treatment  PO: Advance diet as medically feasible/appropriate  EN/PN: Adjust EN, Deliver estimated needs    Nutrition Intervention      Follow treatment progress, Care plan reviewed    Continue EN per order/as tolerated  +prosource to better meet needs as renal fx improving.   +fiber to assist with stool formation.     Monitoring/Evaluation:   Per protocol, I&O, Pertinent labs, EN delivery/tolerance, Weight, GI status, Symptoms, POC/GOC, Swallow function, Hemodynamic stability      Chuyita Moreno RD, [facility]  Time Spent: 30m

## 2024-03-22 NOTE — PROGRESS NOTES
"   LOS: 12 days    Patient Care Team:  Deann Cao MD as PCP - General (Internal Medicine)    Consulted for: Acute on chronic renal failure, hyperkalemia  68-year-old CKD stage III admitted with shortness of breath, choking of food at NH, admitted with JAYY, hyperkalemia, acute respiratory failure now intubated, anemia.  Later developed oliguria with increasing BUN/creatinine.  Patient was treated with Lokelma, bicarb, dextrose, insulin, calcium gluconate.    Subjective     Intubated and sedated.   Not on pressors.         Review of Systems:    Review of systems could not be obtained due to  patient intubated. emergent nature of case.    Objective     Vital Sign Min/Max for last 24 hours  Temp  Min: 98.2 °F (36.8 °C)  Max: 99.1 °F (37.3 °C)   BP  Min: 122/53  Max: 154/62   Pulse  Min: 59  Max: 71   Resp  Min: 18  Max: 22   SpO2  Min: 92 %  Max: 99 %   No data recorded   Weight  Min: 98.2 kg (216 lb 7.9 oz)  Max: 98.2 kg (216 lb 7.9 oz)     Flowsheet Rows      Flowsheet Row First Filed Value   Admission Height 151.9 cm (59.8\") Documented at 03/10/2024 0910   Admission Weight 84.8 kg (187 lb) Documented at 03/10/2024 0910            I/O this shift:  In: 190 [I.V.:46; Other:60; NG/GT:84]  Out: 550 [Urine:250; Stool:300]  I/O last 3 completed shifts:  In: 3510.5 [I.V.:819.3; Other:1038; NG/GT:1456; IV Piggyback:197.2]  Out: 4700 [Urine:4100; Stool:600]    Physical Exam:  General Appearance: AA male critically ill remain on ventilator  Neck: Restricted due to intubation no JVD  Lungs: Ventilator sound heard, equal chest movement, nonlabored.  Heart: No gallop, murmur, rub, RRR.  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    : + Story catheter in place.     WBC WBC   Date Value Ref Range Status   03/22/2024 10.73 3.40 - 10.80 10*3/mm3 Final   03/21/2024 13.58 (H) 3.40 - 10.80 " "10*3/mm3 Final   03/20/2024 14.48 (H) 3.40 - 10.80 10*3/mm3 Final      HGB Hemoglobin   Date Value Ref Range Status   03/22/2024 8.2 (L) 13.0 - 17.7 g/dL Final   03/21/2024 8.6 (L) 13.0 - 17.7 g/dL Final   03/20/2024 8.7 (L) 13.0 - 17.7 g/dL Final      HCT Hematocrit   Date Value Ref Range Status   03/22/2024 26.7 (L) 37.5 - 51.0 % Final   03/21/2024 28.2 (L) 37.5 - 51.0 % Final   03/20/2024 28.6 (L) 37.5 - 51.0 % Final      Platlets No results found for: \"LABPLAT\"   MCV MCV   Date Value Ref Range Status   03/22/2024 91.1 79.0 - 97.0 fL Final   03/21/2024 91.0 79.0 - 97.0 fL Final   03/20/2024 91.4 79.0 - 97.0 fL Final          Sodium Sodium   Date Value Ref Range Status   03/22/2024 147 (H) 136 - 145 mmol/L Final   03/21/2024 148 (H) 136 - 145 mmol/L Final   03/20/2024 143 136 - 145 mmol/L Final      Potassium Potassium   Date Value Ref Range Status   03/22/2024 3.4 (L) 3.5 - 5.2 mmol/L Final   03/21/2024 3.6 3.5 - 5.2 mmol/L Final   03/20/2024 3.5 3.5 - 5.2 mmol/L Final     Comment:     Slight hemolysis detected by analyzer. Result may be falsely elevated.      Chloride Chloride   Date Value Ref Range Status   03/22/2024 115 (H) 98 - 107 mmol/L Final   03/21/2024 114 (H) 98 - 107 mmol/L Final   03/20/2024 110 (H) 98 - 107 mmol/L Final      CO2 CO2   Date Value Ref Range Status   03/22/2024 24.0 22.0 - 29.0 mmol/L Final   03/21/2024 27.0 22.0 - 29.0 mmol/L Final   03/20/2024 26.0 22.0 - 29.0 mmol/L Final      BUN BUN   Date Value Ref Range Status   03/22/2024 38 (H) 8 - 23 mg/dL Final   03/21/2024 48 (H) 8 - 23 mg/dL Final   03/20/2024 55 (H) 8 - 23 mg/dL Final      Creatinine Creatinine   Date Value Ref Range Status   03/22/2024 1.15 0.76 - 1.27 mg/dL Final   03/21/2024 1.44 (H) 0.76 - 1.27 mg/dL Final   03/20/2024 1.70 (H) 0.76 - 1.27 mg/dL Final      Calcium Calcium   Date Value Ref Range Status   03/22/2024 7.6 (L) 8.6 - 10.5 mg/dL Final   03/21/2024 7.8 (L) 8.6 - 10.5 mg/dL Final   03/20/2024 8.4 (L) 8.6 - " "10.5 mg/dL Final      PO4 No results found for: \"CAPO4\"   Albumin No results found for: \"ALBUMIN\"     Magnesium Magnesium   Date Value Ref Range Status   03/22/2024 2.0 1.6 - 2.4 mg/dL Final   03/21/2024 2.2 1.6 - 2.4 mg/dL Final   03/20/2024 2.4 1.6 - 2.4 mg/dL Final        Uric Acid No results found for: \"URICACID\"        Results Review:     I reviewed the patient's new clinical results.    albuterol, 2.5 mg, Nebulization, Q6H - RT  amLODIPine, 10 mg, Nasogastric, Q24H  ARIPiprazole, 5 mg, Nasogastric, Daily  artificial tears, , Both Eyes, Q4H  brimonidine, 1 drop, Both Eyes, TID   And  timolol, 1 drop, Both Eyes, BID  carvedilol, 12.5 mg, Nasogastric, Q12H  chlorhexidine, 15 mL, Mouth/Throat, Q12H  cloNIDine, 0.2 mg, Nasogastric, Q8H  docusate sodium, 100 mg, Nasogastric, BID  ertapenem, 1,000 mg, Intravenous, Q24H  FLUoxetine, 20 mg, Nasogastric, Q12H  heparin (porcine), 5,000 Units, Subcutaneous, Q8H  hydrALAZINE, 100 mg, Nasogastric, Q8H  insulin detemir, 5 Units, Subcutaneous, Q12H  insulin regular, 2-9 Units, Subcutaneous, Q6H  oxyCODONE, 10 mg, Nasogastric, Q6H  pantoprazole, 40 mg, Intravenous, Q12H  polyethylene glycol, 17 g, Nasogastric, BID  QUEtiapine, 25 mg, Nasogastric, Q12H  sodium chloride, 10 mL, Intravenous, Q12H  terazosin, 5 mg, Nasogastric, Q12H  torsemide, 5 mg, Nasogastric, Daily      dexmedetomidine, 0.2-1.5 mcg/kg/hr (Order-Specific), Last Rate: 0.8 mcg/kg/hr (03/22/24 0445)        Medication Review: Reviewed    Results from last 7 days   Lab Units 03/22/24 0424 03/21/24  0449 03/20/24 0424 03/19/24  0329   SODIUM mmol/L 147* 148* 143 149*   POTASSIUM mmol/L 3.4* 3.6 3.5 4.0   CHLORIDE mmol/L 115* 114* 110* 115*   CO2 mmol/L 24.0 27.0 26.0 25.0   BUN mg/dL 38* 48* 55* 50*   CREATININE mg/dL 1.15 1.44* 1.70* 1.82*   CALCIUM mg/dL 7.6* 7.8* 8.4* 8.3*   WBC 10*3/mm3 10.73 13.58* 14.48* 12.32*   HEMOGLOBIN g/dL 8.2* 8.6* 8.7* 8.8*   PLATELETS 10*3/mm3 414 381 290 213             Assessment & " Plan       Acute respiratory failure with hypoxemia    Essential hypertension    Neurocognitive disorder    AMS (altered mental status)    Dementia    Dysphagia    Iron deficiency anemia    Protein calorie malnutrition    Type 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  Acute renal failure on CKD stage III acute rise in creatinine with recent event.  Decreased renal perfusion, hypoxia, IV contrast.  2.  Hyperkalemia multifactorial due to JAYY, respiratory failure, acidosis.  3.  CKD stage III - Baseline Scr 1.6- 1.8.  CT abdomen showed normal-sized kidneys with no stones or hydronephrosis.ultrasound revealed right kidney 12.1 cm, left kidney 11.3 cm without hydronephrosis.   4.  Respite failure on ventilator CT angio negative for pulmonary embolism  5.  Essential hypertension  6.  Altered mental status  7.  Bradycardia  8.  Dementia     Recommendations:  - Continue free water.   - repeat dose of metolazone  - will replete K   - Keep MAP greater than 65 mmHg.  - Avoid nephrotoxic medications.  - Adjust medication according to new GFR.  - Continue  torsemide at 5 mg po q daily.     High risk complex patient multiple medical problems.      Farshad Jeong MD  03/22/24  08:53 EDT

## 2024-03-22 NOTE — PROGRESS NOTES
INTENSIVIST   PROGRESS NOTE     Hospital:  LOS: 12 days     Mr. Omkar Nava, 68 y.o. male is followed for a Chief Complaint of: Respiratory failure, pneumonia      Subjective   S     Interval History:  No acute events.        The patient's relevant past medical, surgical and social history were reviewed and updated in Epic as appropriate.      ROS: Unable to obtain secondary to sedation and mechanical ventilation.     Objective   O     Vitals:  Temp  Min: 98.2 °F (36.8 °C)  Max: 99.3 °F (37.4 °C)  BP  Min: 107/66  Max: 154/62  Pulse  Min: 60  Max: 72  Resp  Min: 18  Max: 23  SpO2  Min: 92 %  Max: 99 % No data recorded    Intake/Ouptut 24 hrs (7:00AM - 6:59 AM)  Intake & Output (last 3 days)         03/15 0701 03/16 0700 03/16 0701 03/17 0700 03/17 0701 03/18 0700 03/18 0701 03/19 0700    I.V. (mL/kg) 1860.5 (20.4) 836.4 (8.5) 2531.7 (25.6) 398.6 (4)    Blood        Other 181 193 321 170    NG/ 637 722 243    IV Piggyback 52.7  278.5     Total Intake(mL/kg) 2652.2 (29.1) 1666.4 (16.9) 3853.2 (39) 811.6 (8.2)    Urine (mL/kg/hr) 3765 (1.7) 1815 (0.8) 1475 (0.6) 325 (0.4)    Stool   0     Total Output 3765 1815 1475 325    Net -1112.8 -148.6 +2378.2 +486.6            Stool Unmeasured Occurrence   3 x             Medications (drips):  dexmedetomidine, Last Rate: 0.63 mcg/kg/hr (03/22/24 1026)        Mechanical Ventilator Settings:          Resp Rate (Set): 18  Pressure Support (cm H2O): 10 cm H20  FiO2 (%): 45 %  PEEP/CPAP (cm H2O): 5 cm H20    Minute Ventilation (L/min) (Obs): 6.76 L/min  Resp Rate (Observed) Vent: 17  I:E Ratio (Set): 1:2.33  I:E Ratio (Obs): 1:3.1    PIP Observed (cm H2O): 15 cm H2O  Plateau Pressure (cm H2O): (S) 18 cm H2O    Physical Examination  Telemetry:  Normal sinus rhythm.    Constitutional:  No acute distress.  ETT in place on mechanical ventilation.    Eyes: No scleral icterus.   PERRL, EOM intact.    Neck:  Supple, FROM   Cardiovascular: Normal rate, regular and rhythm. Normal  heart sounds.  No murmurs, gallop or rub.   Respiratory: No respiratory distress. Normal respiratory effort.  Diminished. No wheezing.    Abdominal:  Soft. No masses. Nontender. No distension. No HSM.   Extremities: No digital cyanosis. No clubbing.  1+ peripheral edema.   Skin: No rashes, lesions or ulcers   Neurological:   Sedated. Moves extremities to stimulation. Does not follow commands. Mild agitation.              Results from last 7 days   Lab Units 03/22/24  0424 03/21/24 0449 03/20/24 0424   WBC 10*3/mm3 10.73 13.58* 14.48*   HEMOGLOBIN g/dL 8.2* 8.6* 8.7*   MCV fL 91.1 91.0 91.4   PLATELETS 10*3/mm3 414 381 290     Results from last 7 days   Lab Units 03/22/24  0424 03/21/24 0449 03/20/24 0424   SODIUM mmol/L 147* 148* 143   POTASSIUM mmol/L 3.4* 3.6 3.5   CO2 mmol/L 24.0 27.0 26.0   CREATININE mg/dL 1.15 1.44* 1.70*   GLUCOSE mg/dL 150* 232* 211*   MAGNESIUM mg/dL 2.0 2.2 2.4   PHOSPHORUS mg/dL 1.9* 2.3* 2.0*     Estimated Creatinine Clearance: 76.7 mL/min (by C-G formula based on SCr of 1.15 mg/dL).          Results from last 7 days   Lab Units 03/20/24  0343 03/19/24  0427 03/18/24  0426 03/17/24  0315 03/16/24  0447   PH, ARTERIAL pH units 7.441 7.363 7.387 7.406 7.396   PCO2, ARTERIAL mm Hg 39.1 46.8* 44.2 44.6 45.7*   PO2 ART mm Hg 77.6* 61.2* 58.7* 106.0 82.8*   FIO2 % 50 45 50 50 40       Images:  Imaging Results (Last 24 Hours)       Procedure Component Value Units Date/Time    XR Chest 1 View [[id number]] Collected: 03/22/24 0806     Updated: 03/22/24 0810    Narrative:      XR CHEST 1 VW    Date of Exam: 3/22/2024 3:33 AM EDT    Indication: to confirm placement of ET Tube -Manual Prone Protocol    Comparison: 3/21/2024    Findings:  ET tube, left IJ catheter, and feeding tube appear to be in satisfactory position. Heart is mildly enlarged. Diffuse and extensive pulmonary interstitial disease pattern may represent edema or ARDS, and is not significantly changed. No pneumothorax is   seen.       Impression:      Impression:    1. ET tube remains in good position at the level of the clavicles.    2. Diffuse and extensive pulmonary interstitial disease unchanged.      Electronically Signed: Wisam Oquendo MD    3/22/2024 8:07 AM EDT    Workstation ID: [id number]               Results: Reviewed.  I reviewed the patient's new laboratory and imaging results.  I independently reviewed the patient's new images.    Medications: Reviewed.    Assessment & Plan   A / P     Mr. Nava is a 68-year-old gentleman with diabetes mellitus complicated by blindness, osteomyelitis, chronic kidney disease, underlying dementia with psychosis, dependent for mobility, bathing and dressing, chronic anemia, heart failure with normal EF who is now admitted for his third admission in the last 2.5 months for an aspiration event with aspiration pneumonia.  On his previous admission a modified barium swallow did reveal moderate oral and mild pharyngeal dysphagia February 5, 2024.  Respiratory status deteriorated overnight on March 10 and he required intubation.  He initially was requiring 35% oxygen over March 13, 14th oxygenation worsened and he required 80% FiO2.  CTA of the chest was repeated and was negative for PE but revealed worsening bilateral pneumonia.  He developed abdominal distention.  CTA of the abdomen revealed no bowel obstruction or ischemic bowel.       Respiratory cultures are growing ESBL Klebsiella and antibiotics were changed to Ertapenem on 3/16.     Fentanyl turned off on 3/20.     Tolerated pressure support trial x 3 hours on 3/21 and then failed secondary to apnea.     Nutrition:   NPO Diet NPO Type: Strict NPO  Advance Directives:   Code Status and Medical Interventions:   Ordered at: 03/10/24 1302     Code Status (Patient has no pulse and is not breathing):    CPR (Attempt to Resuscitate)     Medical Interventions (Patient has pulse or is breathing):    Full Support       Active Hospital Problems    Diagnosis      **Acute respiratory failure with hypoxemia     Healthcare-associated pneumonia     Acute kidney injury superimposed on chronic kidney disease     AMS (altered mental status)     Dysphagia     Dementia     Protein calorie malnutrition     Neurocognitive disorder     Essential hypertension     Type 2 diabetes mellitus with diabetic chronic kidney disease     Iron deficiency anemia        Assessment / Plan:    Continue mechanical ventilation. Today is day 12 on the vent. Pressure support as tolerated. Family thinking about whether or not to pursue trach/PEG.   Continue Precedex. Hold Fentanyl.   Continue Seroquel.   Continue tube feeds.   Ertapenem for pneumonia. Will complete a 10 day course.   Nephrology following for JAYY.   Hold bowel regimen secondary to diarrhea.   Replace potassium and phosphorus.   AM labs and CXR.     High risk secondary to management of mechanical ventilation.     High level of risk due to:  severe exacerbation of chronic illness and illness with threat to life or bodily function.    Plan of care and goals reviewed during interdisciplinary rounds.  I discussed the patient's findings and my recommendations with nursing staff      Radha Wetzel,     Intensive Care Medicine and Pulmonary Medicine

## 2024-03-22 NOTE — CASE MANAGEMENT/SOCIAL WORK
Continued Stay Note   [place]     Patient Name: Omkar Nava  MRN: [id number]  Today's Date: 3/22/2024    Admit Date: 3/10/2024    Plan: Ongoing   Discharge Plan       Row Name 03/22/24 1048       Plan    Plan Ongoing    Plan Comments Discussed patient in MDR.  Ongoing pressure support trials; attempting to wean from ventilator.  Intensivist discussed goals of care with patient's daughter regarding whether or not patient would want tracheostomy; daughter to discuss with her aunts.  Patient currently has LTC bed at  [facility]; however, [facility] does not accept trachs.  If family opts for trach, another LTC facility will be needed.   CM will continue to follow.                   Discharge Codes    No documentation.                          Daisy Kelly RN

## 2024-03-22 NOTE — PLAN OF CARE
Goal Outcome Evaluation:              Outcome Evaluation: Remains on vent acvc+ but tolerated SBT yesterday x3hrs. Secretions remain thick . Will follow simple commands. SBP remains laible 120-150's. 600 ml out of fms. F/c in place with 1700 of yellow urine output. Tolerating TF. T-max 37.4. Blood glucose improved with 173 and 154 with levemir. Remains restrained.

## 2024-03-22 NOTE — PLAN OF CARE
Goal Outcome Evaluation: Tolerated SBT for >6 hours. Tolerating TF. Multiple loose stools. RASS -1/CPOT 0.      Problem: Skin Injury Risk Increased  Goal: Skin Health and Integrity  Intervention: Optimize Skin Protection  Recent Flowsheet Documentation  Taken 3/22/2024 1800 by Ed Feldman RN  Head of Bed ([facility]) Positioning: HOB at 30-45 degrees  Taken 3/22/2024 1600 by Ed Feldman RN  Pressure Reduction Techniques: weight shift assistance provided  Head of Bed ([facility]) Positioning: HOB at 30-45 degrees  Pressure Reduction Devices: specialty bed utilized  Skin Protection:   tubing/devices free from skin contact   skin-to-skin areas padded   skin-to-device areas padded   incontinence pads utilized  Taken 3/22/2024 1400 by Ed Feldman RN  Head of Bed ([facility]) Positioning: HOB at 30-45 degrees  Taken 3/22/2024 1200 by Ed Feldman RN  Pressure Reduction Techniques:   weight shift assistance provided   heels elevated off bed  Head of Bed ([facility]) Positioning: HOB at 30-45 degrees  Pressure Reduction Devices: specialty bed utilized  Skin Protection:   tubing/devices free from skin contact   skin-to-skin areas padded   skin-to-device areas padded   incontinence pads utilized   drying agents applied  Taken 3/22/2024 1000 by Ed Feldman RN  Head of Bed ([facility]) Positioning: HOB at 30-45 degrees  Taken 3/22/2024 0800 by Ed Feldman RN  Pressure Reduction Techniques:   weight shift assistance provided   heels elevated off bed  Head of Bed ([facility]) Positioning: HOB at 30-45 degrees  Pressure Reduction Devices: specialty bed utilized  Skin Protection:   tubing/devices free from skin contact   skin-to-skin areas padded   skin-to-device areas padded   protective footwear used   incontinence pads utilized     Problem: Fall Injury Risk  Goal: Absence of Fall and Fall-Related Injury  Intervention: Identify and Manage Contributors  Recent Flowsheet Documentation  Taken 3/22/2024 1800 by Ed Feldman RN  Medication  Review/Management: medications reviewed  Taken 3/22/2024 1600 by Ed Feldman RN  Medication Review/Management: medications reviewed  Taken 3/22/2024 1400 by Ed Feldman RN  Medication Review/Management: medications reviewed  Taken 3/22/2024 1200 by Ed Feldman RN  Medication Review/Management: medications reviewed  Taken 3/22/2024 1000 by Ed Feldman RN  Medication Review/Management: medications reviewed  Taken 3/22/2024 0800 by Ed Feldman RN  Medication Review/Management: medications reviewed     Problem: Fall Injury Risk  Goal: Absence of Fall and Fall-Related Injury  Intervention: Promote Injury-Free Environment  Recent Flowsheet Documentation  Taken 3/22/2024 1800 by Ed Feldman RN  Safety Promotion/Fall Prevention:   safety round/check completed   activity supervised  Taken 3/22/2024 1600 by Ed Feldman RN  Safety Promotion/Fall Prevention:   safety round/check completed   activity supervised  Taken 3/22/2024 1400 by Ed Feldman RN  Safety Promotion/Fall Prevention:   safety round/check completed   activity supervised  Taken 3/22/2024 1200 by Ed Feldman RN  Safety Promotion/Fall Prevention:   safety round/check completed   activity supervised  Taken 3/22/2024 1000 by Ed Feldman RN  Safety Promotion/Fall Prevention:   safety round/check completed   activity supervised  Taken 3/22/2024 0800 by Ed Feldman RN  Safety Promotion/Fall Prevention:   safety round/check completed   activity supervised     Problem: Adult Inpatient Plan of Care  Goal: Absence of Hospital-Acquired Illness or Injury  Intervention: Identify and Manage Fall Risk  Recent Flowsheet Documentation  Taken 3/22/2024 1800 by Ed Feldman RN  Safety Promotion/Fall Prevention:   safety round/check completed   activity supervised  Taken 3/22/2024 1600 by Ed Feldman RN  Safety Promotion/Fall Prevention:   safety round/check completed   activity supervised  Taken 3/22/2024 1400 by  Feldman, Ed, RN  Safety Promotion/Fall Prevention:   safety round/check completed   activity supervised  Taken 3/22/2024 1200 by Ed Feldman RN  Safety Promotion/Fall Prevention:   safety round/check completed   activity supervised  Taken 3/22/2024 1000 by Ed Feldman RN  Safety Promotion/Fall Prevention:   safety round/check completed   activity supervised  Taken 3/22/2024 0800 by Ed Feldman RN  Safety Promotion/Fall Prevention:   safety round/check completed   activity supervised     Problem: Adult Inpatient Plan of Care  Goal: Absence of Hospital-Acquired Illness or Injury  Intervention: Prevent Skin Injury  Recent Flowsheet Documentation  Taken 3/22/2024 1800 by Ed Feldman RN  Body Position:   weight shifting   neutral body alignment  Taken 3/22/2024 1600 by Ed Feldman RN  Body Position:   weight shifting   left  Skin Protection:   tubing/devices free from skin contact   skin-to-skin areas padded   skin-to-device areas padded   incontinence pads utilized  Taken 3/22/2024 1400 by Ed Feldman RN  Body Position:   weight shifting   right  Taken 3/22/2024 1200 by Ed Feldman RN  Body Position:   weight shifting   neutral body alignment  Skin Protection:   tubing/devices free from skin contact   skin-to-skin areas padded   skin-to-device areas padded   incontinence pads utilized   drying agents applied  Taken 3/22/2024 1000 by Ed Feldman RN  Body Position:   weight shifting   left  Taken 3/22/2024 0800 by Ed Feldman RN  Body Position:   weight shifting   right  Skin Protection:   tubing/devices free from skin contact   skin-to-skin areas padded   skin-to-device areas padded   protective footwear used   incontinence pads utilized

## 2024-03-23 ENCOUNTER — APPOINTMENT (OUTPATIENT)
Dept: GENERAL RADIOLOGY | Facility: HOSPITAL | Age: 69
End: 2024-03-23
Payer: MEDICARE

## 2024-03-23 LAB
ANION GAP SERPL CALCULATED.3IONS-SCNC: 6 MMOL/L (ref 5–15)
BUN SERPL-MCNC: 38 MG/DL (ref 8–23)
BUN/CREAT SERPL: 33 (ref 7–25)
CALCIUM SPEC-SCNC: 7.9 MG/DL (ref 8.6–10.5)
CHLORIDE SERPL-SCNC: 111 MMOL/L (ref 98–107)
CO2 SERPL-SCNC: 28 MMOL/L (ref 22–29)
CREAT SERPL-MCNC: 1.15 MG/DL (ref 0.76–1.27)
DEPRECATED RDW RBC AUTO: 52.1 FL (ref 37–54)
EGFRCR SERPLBLD CKD-EPI 2021: 70.2 ML/MIN/1.73
ERYTHROCYTE [DISTWIDTH] IN BLOOD BY AUTOMATED COUNT: 15.9 % (ref 12.3–15.4)
GLUCOSE BLDC GLUCOMTR-MCNC: 171 MG/DL (ref 70–130)
GLUCOSE BLDC GLUCOMTR-MCNC: 174 MG/DL (ref 70–130)
GLUCOSE BLDC GLUCOMTR-MCNC: 194 MG/DL (ref 70–130)
GLUCOSE BLDC GLUCOMTR-MCNC: 195 MG/DL (ref 70–130)
GLUCOSE BLDC GLUCOMTR-MCNC: 201 MG/DL (ref 70–130)
GLUCOSE BLDC GLUCOMTR-MCNC: 201 MG/DL (ref 70–130)
GLUCOSE SERPL-MCNC: 191 MG/DL (ref 65–99)
HCT VFR BLD AUTO: 26.7 % (ref 37.5–51)
HGB BLD-MCNC: 8.2 G/DL (ref 13–17.7)
MAGNESIUM SERPL-MCNC: 2.1 MG/DL (ref 1.6–2.4)
MCH RBC QN AUTO: 27.8 PG (ref 26.6–33)
MCHC RBC AUTO-ENTMCNC: 30.7 G/DL (ref 31.5–35.7)
MCV RBC AUTO: 90.5 FL (ref 79–97)
PHOSPHATE SERPL-MCNC: 2.1 MG/DL (ref 2.5–4.5)
PHOSPHATE SERPL-MCNC: 2.8 MG/DL (ref 2.5–4.5)
PLATELET # BLD AUTO: 471 10*3/MM3 (ref 140–450)
PMV BLD AUTO: 11.8 FL (ref 6–12)
POTASSIUM SERPL-SCNC: 3.9 MMOL/L (ref 3.5–5.2)
RBC # BLD AUTO: 2.95 10*6/MM3 (ref 4.14–5.8)
SODIUM SERPL-SCNC: 145 MMOL/L (ref 136–145)
WBC NRBC COR # BLD AUTO: 11.48 10*3/MM3 (ref 3.4–10.8)

## 2024-03-23 PROCEDURE — 85027 COMPLETE CBC AUTOMATED: CPT | Performed by: INTERNAL MEDICINE

## 2024-03-23 PROCEDURE — 63710000001 INSULIN REGULAR HUMAN PER 5 UNITS: Performed by: INTERNAL MEDICINE

## 2024-03-23 PROCEDURE — 82948 REAGENT STRIP/BLOOD GLUCOSE: CPT

## 2024-03-23 PROCEDURE — 84100 ASSAY OF PHOSPHORUS: CPT | Performed by: INTERNAL MEDICINE

## 2024-03-23 PROCEDURE — 94003 VENT MGMT INPAT SUBQ DAY: CPT

## 2024-03-23 PROCEDURE — 63710000001 INSULIN DETEMIR PER 5 UNITS: Performed by: INTERNAL MEDICINE

## 2024-03-23 PROCEDURE — 99232 SBSQ HOSP IP/OBS MODERATE 35: CPT | Performed by: INTERNAL MEDICINE

## 2024-03-23 PROCEDURE — 94761 N-INVAS EAR/PLS OXIMETRY MLT: CPT

## 2024-03-23 PROCEDURE — 80048 BASIC METABOLIC PNL TOTAL CA: CPT | Performed by: INTERNAL MEDICINE

## 2024-03-23 PROCEDURE — 83735 ASSAY OF MAGNESIUM: CPT | Performed by: INTERNAL MEDICINE

## 2024-03-23 PROCEDURE — 71045 X-RAY EXAM CHEST 1 VIEW: CPT

## 2024-03-23 PROCEDURE — 25010000002 ERTAPENEM PER 500 MG: Performed by: INTERNAL MEDICINE

## 2024-03-23 PROCEDURE — 94799 UNLISTED PULMONARY SVC/PX: CPT

## 2024-03-23 PROCEDURE — 25010000002 HEPARIN (PORCINE) PER 1000 UNITS: Performed by: INTERNAL MEDICINE

## 2024-03-23 RX ORDER — METOLAZONE 5 MG/1
2.5 TABLET ORAL ONCE
Status: COMPLETED | OUTPATIENT
Start: 2024-03-23 | End: 2024-03-23

## 2024-03-23 RX ORDER — POTASSIUM CHLORIDE 1.5 G/1.58G
40 POWDER, FOR SOLUTION ORAL ONCE
Status: COMPLETED | OUTPATIENT
Start: 2024-03-23 | End: 2024-03-23

## 2024-03-23 RX ADMIN — CARVEDILOL 12.5 MG: 12.5 TABLET, FILM COATED ORAL at 21:24

## 2024-03-23 RX ADMIN — DEXMEDETOMIDINE HYDROCHLORIDE 0.8 MCG/KG/HR: 400 INJECTION, SOLUTION INTRAVENOUS at 02:13

## 2024-03-23 RX ADMIN — ALBUTEROL SULFATE 2.5 MG: 2.5 SOLUTION RESPIRATORY (INHALATION) at 23:18

## 2024-03-23 RX ADMIN — ALBUTEROL SULFATE 2.5 MG: 2.5 SOLUTION RESPIRATORY (INHALATION) at 13:51

## 2024-03-23 RX ADMIN — Medication 2 PACKET: at 09:14

## 2024-03-23 RX ADMIN — 0.12% CHLORHEXIDINE GLUCONATE 15 ML: 1.2 RINSE ORAL at 08:48

## 2024-03-23 RX ADMIN — WHITE PETROLATUM 57.7 %-MINERAL OIL 31.9 % EYE OINTMENT: at 02:13

## 2024-03-23 RX ADMIN — HEPARIN SODIUM 5000 UNITS: 5000 INJECTION INTRAVENOUS; SUBCUTANEOUS at 05:32

## 2024-03-23 RX ADMIN — PANTOPRAZOLE SODIUM 40 MG: 40 INJECTION, POWDER, FOR SOLUTION INTRAVENOUS at 21:25

## 2024-03-23 RX ADMIN — HUMAN INSULIN 4 UNITS: 100 INJECTION, SOLUTION SUBCUTANEOUS at 23:31

## 2024-03-23 RX ADMIN — TERAZOSIN HYDROCHLORIDE ANHYDROUS 5 MG: 5 CAPSULE ORAL at 08:48

## 2024-03-23 RX ADMIN — DEXMEDETOMIDINE HYDROCHLORIDE 0.8 MCG/KG/HR: 400 INJECTION, SOLUTION INTRAVENOUS at 23:25

## 2024-03-23 RX ADMIN — HYDRALAZINE HYDROCHLORIDE 100 MG: 50 TABLET ORAL at 05:33

## 2024-03-23 RX ADMIN — DEXMEDETOMIDINE HYDROCHLORIDE 1 MCG/KG/HR: 400 INJECTION, SOLUTION INTRAVENOUS at 12:54

## 2024-03-23 RX ADMIN — OXYCODONE HYDROCHLORIDE 10 MG: 10 TABLET ORAL at 05:33

## 2024-03-23 RX ADMIN — ALBUTEROL SULFATE 2.5 MG: 2.5 SOLUTION RESPIRATORY (INHALATION) at 07:32

## 2024-03-23 RX ADMIN — ALBUTEROL SULFATE 2.5 MG: 2.5 SOLUTION RESPIRATORY (INHALATION) at 01:30

## 2024-03-23 RX ADMIN — PANTOPRAZOLE SODIUM 40 MG: 40 INJECTION, POWDER, FOR SOLUTION INTRAVENOUS at 08:48

## 2024-03-23 RX ADMIN — CLONIDINE HYDROCHLORIDE 0.2 MG: 0.2 TABLET ORAL at 05:33

## 2024-03-23 RX ADMIN — WHITE PETROLATUM 57.7 %-MINERAL OIL 31.9 % EYE OINTMENT: at 09:14

## 2024-03-23 RX ADMIN — CARVEDILOL 12.5 MG: 12.5 TABLET, FILM COATED ORAL at 08:49

## 2024-03-23 RX ADMIN — HUMAN INSULIN 2 UNITS: 100 INJECTION, SOLUTION SUBCUTANEOUS at 17:50

## 2024-03-23 RX ADMIN — WHITE PETROLATUM 57.7 %-MINERAL OIL 31.9 % EYE OINTMENT: at 05:34

## 2024-03-23 RX ADMIN — HUMAN INSULIN 4 UNITS: 100 INJECTION, SOLUTION SUBCUTANEOUS at 12:29

## 2024-03-23 RX ADMIN — POTASSIUM CHLORIDE 40 MEQ: 1.5 POWDER, FOR SOLUTION ORAL at 14:14

## 2024-03-23 RX ADMIN — DEXMEDETOMIDINE HYDROCHLORIDE 0.8 MCG/KG/HR: 400 INJECTION, SOLUTION INTRAVENOUS at 07:50

## 2024-03-23 RX ADMIN — ACETAMINOPHEN 650 MG: 650 SOLUTION ORAL at 23:31

## 2024-03-23 RX ADMIN — BRIMONIDINE TARTRATE 1 DROP: 2 SOLUTION/ DROPS OPHTHALMIC at 22:00

## 2024-03-23 RX ADMIN — ARIPIPRAZOLE 5 MG: 10 TABLET ORAL at 08:48

## 2024-03-23 RX ADMIN — HUMAN INSULIN 4 UNITS: 100 INJECTION, SOLUTION SUBCUTANEOUS at 17:50

## 2024-03-23 RX ADMIN — TIMOLOL MALEATE 1 DROP: 5 SOLUTION/ DROPS OPHTHALMIC at 08:51

## 2024-03-23 RX ADMIN — AMLODIPINE BESYLATE 10 MG: 10 TABLET ORAL at 08:48

## 2024-03-23 RX ADMIN — DEXMEDETOMIDINE HYDROCHLORIDE 0.8 MCG/KG/HR: 400 INJECTION, SOLUTION INTRAVENOUS at 18:17

## 2024-03-23 RX ADMIN — POTASSIUM & SODIUM PHOSPHATES POWDER PACK 280-160-250 MG 2 PACKET: 280-160-250 PACK at 05:46

## 2024-03-23 RX ADMIN — INSULIN DETEMIR 5 UNITS: 100 INJECTION, SOLUTION SUBCUTANEOUS at 08:50

## 2024-03-23 RX ADMIN — TORSEMIDE 5 MG: 10 TABLET ORAL at 08:48

## 2024-03-23 RX ADMIN — Medication 10 ML: at 08:48

## 2024-03-23 RX ADMIN — TIMOLOL MALEATE 1 DROP: 5 SOLUTION/ DROPS OPHTHALMIC at 22:00

## 2024-03-23 RX ADMIN — METOLAZONE 2.5 MG: 5 TABLET ORAL at 14:13

## 2024-03-23 RX ADMIN — CLONIDINE HYDROCHLORIDE 0.2 MG: 0.2 TABLET ORAL at 21:24

## 2024-03-23 RX ADMIN — OXYCODONE HYDROCHLORIDE 10 MG: 10 TABLET ORAL at 12:29

## 2024-03-23 RX ADMIN — TERAZOSIN HYDROCHLORIDE ANHYDROUS 5 MG: 5 CAPSULE ORAL at 21:24

## 2024-03-23 RX ADMIN — QUETIAPINE FUMARATE 25 MG: 25 TABLET ORAL at 08:49

## 2024-03-23 RX ADMIN — ALBUTEROL SULFATE 2.5 MG: 2.5 SOLUTION RESPIRATORY (INHALATION) at 19:43

## 2024-03-23 RX ADMIN — Medication 30 ML: at 09:14

## 2024-03-23 RX ADMIN — HUMAN INSULIN 4 UNITS: 100 INJECTION, SOLUTION SUBCUTANEOUS at 05:32

## 2024-03-23 RX ADMIN — BRIMONIDINE TARTRATE 1 DROP: 2 SOLUTION/ DROPS OPHTHALMIC at 08:51

## 2024-03-23 RX ADMIN — 0.12% CHLORHEXIDINE GLUCONATE 15 ML: 1.2 RINSE ORAL at 21:25

## 2024-03-23 RX ADMIN — FLUOXETINE 20 MG: 20 CAPSULE ORAL at 11:01

## 2024-03-23 RX ADMIN — HYDRALAZINE HYDROCHLORIDE 100 MG: 50 TABLET ORAL at 21:24

## 2024-03-23 RX ADMIN — HUMAN INSULIN 2 UNITS: 100 INJECTION, SOLUTION SUBCUTANEOUS at 23:31

## 2024-03-23 RX ADMIN — BRIMONIDINE TARTRATE 1 DROP: 2 SOLUTION/ DROPS OPHTHALMIC at 16:19

## 2024-03-23 RX ADMIN — CLONIDINE HYDROCHLORIDE 0.2 MG: 0.2 TABLET ORAL at 14:13

## 2024-03-23 RX ADMIN — HUMAN INSULIN 4 UNITS: 100 INJECTION, SOLUTION SUBCUTANEOUS at 14:13

## 2024-03-23 RX ADMIN — HEPARIN SODIUM 5000 UNITS: 5000 INJECTION INTRAVENOUS; SUBCUTANEOUS at 14:13

## 2024-03-23 RX ADMIN — HYDRALAZINE HYDROCHLORIDE 100 MG: 50 TABLET ORAL at 14:13

## 2024-03-23 RX ADMIN — FLUOXETINE 20 MG: 20 CAPSULE ORAL at 22:01

## 2024-03-23 RX ADMIN — INSULIN DETEMIR 6 UNITS: 100 INJECTION, SOLUTION SUBCUTANEOUS at 22:01

## 2024-03-23 RX ADMIN — ERTAPENEM 1000 MG: 1 INJECTION INTRAMUSCULAR; INTRAVENOUS at 16:19

## 2024-03-23 RX ADMIN — HEPARIN SODIUM 5000 UNITS: 5000 INJECTION INTRAVENOUS; SUBCUTANEOUS at 21:25

## 2024-03-23 NOTE — PROGRESS NOTES
"   LOS: 13 days    Patient Care Team:  Deann Cao MD as PCP - General (Internal Medicine)    Consulted for: Acute on chronic renal failure, hyperkalemia  68-year-old CKD stage III admitted with shortness of breath, choking of food at NH, admitted with JAYY, hyperkalemia, acute respiratory failure now intubated, anemia.  Later developed oliguria with increasing BUN/creatinine.  Patient was treated with Lokelma, bicarb, dextrose, insulin, calcium gluconate.    Subjective     Intubated and sedated.   Not on pressors.   No acute events       Review of Systems:    Review of systems could not be obtained due to  patient intubated. emergent nature of case.    Objective     Vital Sign Min/Max for last 24 hours  Temp  Min: 99.1 °F (37.3 °C)  Max: 100.6 °F (38.1 °C)   BP  Min: 107/66  Max: 161/60   Pulse  Min: 62  Max: 72   Resp  Min: 18  Max: 23   SpO2  Min: 92 %  Max: 100 %   No data recorded   No data recorded     Flowsheet Rows      Flowsheet Row First Filed Value   Admission Height 151.9 cm (59.8\") Documented at 03/10/2024 0910   Admission Weight 84.8 kg (187 lb) Documented at 03/10/2024 0910            I/O this shift:  In: 187 [I.V.:52; Other:60; NG/GT:75]  Out: 250 [Urine:150; Stool:100]  I/O last 3 completed shifts:  In: 4155.5 [I.V.:1124.3; Other:1277; NG/GT:1557; IV Piggyback:197.2]  Out: 5060 [Urine:4060; Stool:1000]    Physical Exam:  Gen: intubated and sedated.    HENT: NC, AT  NECK: Supple, ETT in place  LUNGS: Coarse breath sound on vent, non labored respirtation   CVS: S1/S2 audible, RRR, no murmur   Abd: Soft, NT, ND, BS+   Ext: Trace edema, no cyanosis   CNS: Intubated and sedated.   Psy: Intubated and sedated.   Skin: Warm, dry and intact  : + Story catheter in place.     WBC WBC   Date Value Ref Range Status   03/23/2024 11.48 (H) 3.40 - 10.80 10*3/mm3 Final   03/22/2024 10.73 3.40 - 10.80 10*3/mm3 Final   03/21/2024 13.58 (H) 3.40 - 10.80 10*3/mm3 Final      HGB Hemoglobin   Date Value Ref " "Range Status   03/23/2024 8.2 (L) 13.0 - 17.7 g/dL Final   03/22/2024 8.2 (L) 13.0 - 17.7 g/dL Final   03/21/2024 8.6 (L) 13.0 - 17.7 g/dL Final      HCT Hematocrit   Date Value Ref Range Status   03/23/2024 26.7 (L) 37.5 - 51.0 % Final   03/22/2024 26.7 (L) 37.5 - 51.0 % Final   03/21/2024 28.2 (L) 37.5 - 51.0 % Final      Platlets No results found for: \"LABPLAT\"   MCV MCV   Date Value Ref Range Status   03/23/2024 90.5 79.0 - 97.0 fL Final   03/22/2024 91.1 79.0 - 97.0 fL Final   03/21/2024 91.0 79.0 - 97.0 fL Final          Sodium Sodium   Date Value Ref Range Status   03/23/2024 145 136 - 145 mmol/L Final   03/22/2024 147 (H) 136 - 145 mmol/L Final   03/21/2024 148 (H) 136 - 145 mmol/L Final      Potassium Potassium   Date Value Ref Range Status   03/23/2024 3.9 3.5 - 5.2 mmol/L Final     Comment:     Slight hemolysis detected by analyzer. Result may be falsely elevated.   03/22/2024 4.5 3.5 - 5.2 mmol/L Final     Comment:     Slight hemolysis detected by analyzer. Result may be falsely elevated.   03/22/2024 3.4 (L) 3.5 - 5.2 mmol/L Final   03/21/2024 3.6 3.5 - 5.2 mmol/L Final      Chloride Chloride   Date Value Ref Range Status   03/23/2024 111 (H) 98 - 107 mmol/L Final   03/22/2024 115 (H) 98 - 107 mmol/L Final   03/21/2024 114 (H) 98 - 107 mmol/L Final      CO2 CO2   Date Value Ref Range Status   03/23/2024 28.0 22.0 - 29.0 mmol/L Final   03/22/2024 24.0 22.0 - 29.0 mmol/L Final   03/21/2024 27.0 22.0 - 29.0 mmol/L Final      BUN BUN   Date Value Ref Range Status   03/23/2024 38 (H) 8 - 23 mg/dL Final   03/22/2024 38 (H) 8 - 23 mg/dL Final   03/21/2024 48 (H) 8 - 23 mg/dL Final      Creatinine Creatinine   Date Value Ref Range Status   03/23/2024 1.15 0.76 - 1.27 mg/dL Final   03/22/2024 1.15 0.76 - 1.27 mg/dL Final   03/21/2024 1.44 (H) 0.76 - 1.27 mg/dL Final      Calcium Calcium   Date Value Ref Range Status   03/23/2024 7.9 (L) 8.6 - 10.5 mg/dL Final   03/22/2024 7.6 (L) 8.6 - 10.5 mg/dL Final " "  03/21/2024 7.8 (L) 8.6 - 10.5 mg/dL Final      PO4 No results found for: \"CAPO4\"   Albumin No results found for: \"ALBUMIN\"     Magnesium Magnesium   Date Value Ref Range Status   03/23/2024 2.1 1.6 - 2.4 mg/dL Final   03/22/2024 2.0 1.6 - 2.4 mg/dL Final   03/21/2024 2.2 1.6 - 2.4 mg/dL Final        Uric Acid No results found for: \"URICACID\"        Results Review:     I reviewed the patient's new clinical results.    albuterol, 2.5 mg, Nebulization, Q6H - RT  amLODIPine, 10 mg, Nasogastric, Q24H  ARIPiprazole, 5 mg, Nasogastric, Daily  artificial tears, , Both Eyes, Q4H  brimonidine, 1 drop, Both Eyes, TID   And  timolol, 1 drop, Both Eyes, BID  carvedilol, 12.5 mg, Nasogastric, Q12H  chlorhexidine, 15 mL, Mouth/Throat, Q12H  cloNIDine, 0.2 mg, Nasogastric, Q8H  docusate sodium, 100 mg, Nasogastric, BID  ertapenem, 1,000 mg, Intravenous, Q24H  FLUoxetine, 20 mg, Nasogastric, Q12H  heparin (porcine), 5,000 Units, Subcutaneous, Q8H  hydrALAZINE, 100 mg, Nasogastric, Q8H  insulin detemir, 5 Units, Subcutaneous, Q12H  insulin regular, 2-9 Units, Subcutaneous, Q6H  Nutrisource fiber, 2 packet, Nasogastric, BID  oxyCODONE, 10 mg, Nasogastric, Q6H  pantoprazole, 40 mg, Intravenous, Q12H  ProSource No Carb, 30 mL, Nasogastric, Daily  QUEtiapine, 25 mg, Nasogastric, Q12H  sodium chloride, 10 mL, Intravenous, Q12H  terazosin, 5 mg, Nasogastric, Q12H  torsemide, 5 mg, Nasogastric, Daily      dexmedetomidine, 0.2-1.5 mcg/kg/hr (Order-Specific), Last Rate: 0.8 mcg/kg/hr (03/23/24 0750)        Medication Review: Reviewed    Results from last 7 days   Lab Units 03/23/24  0421 03/23/24  0420 03/22/24  1753 03/22/24  0424 03/21/24  0449 03/20/24 0424   SODIUM mmol/L  --  145  --  147* 148* 143   POTASSIUM mmol/L  --  3.9 4.5 3.4* 3.6 3.5   CHLORIDE mmol/L  --  111*  --  115* 114* 110*   CO2 mmol/L  --  28.0  --  24.0 27.0 26.0   BUN mg/dL  --  38*  --  38* 48* 55*   CREATININE mg/dL  --  1.15  --  1.15 1.44* 1.70*   CALCIUM mg/dL "  --  7.9*  --  7.6* 7.8* 8.4*   WBC 10*3/mm3 11.48*  --   --  10.73 13.58* 14.48*   HEMOGLOBIN g/dL 8.2*  --   --  8.2* 8.6* 8.7*   PLATELETS 10*3/mm3 471*  --   --  [phone]             Assessment & Plan       Acute respiratory failure with hypoxemia    Essential hypertension    Neurocognitive disorder    AMS (altered mental status)    Dementia    Dysphagia    Iron deficiency anemia    Protein calorie malnutrition    Type 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  Acute renal failure on CKD stage III acute rise in creatinine with recent event.  Decreased renal perfusion, hypoxia, IV contrast.  2.  Hyperkalemia multifactorial due to JAYY, respiratory failure, acidosis.  3.  CKD stage III - Baseline Scr 1.6- 1.8.  CT abdomen showed normal-sized kidneys with no stones or hydronephrosis.ultrasound revealed right kidney 12.1 cm, left kidney 11.3 cm without hydronephrosis.   4.  Respite failure on ventilator CT angio negative for pulmonary embolism  5.  Essential hypertension  6.  Altered mental status  7.  Bradycardia  8.  Dementia     Recommendations:  - Continue with current   - Keep MAP greater than 65 mmHg.  - Avoid nephrotoxic medications.  - Adjust medication according to new GFR.  High risk complex patient multiple medical problems.      Farshad Jeong MD  03/23/24  08:45 EDT

## 2024-03-23 NOTE — PLAN OF CARE
Goal Outcome Evaluation: Arouses to verbal stimuli and follows simple commands. RASS -1/CPOT 1-2. Copious secretions from inline suction. Stool loose and draining via FMS.      Problem: Skin Injury Risk Increased  Goal: Skin Health and Integrity  Intervention: Optimize Skin Protection  Recent Flowsheet Documentation  Taken 3/23/2024 1600 by Ed Feldman RN  Pressure Reduction Techniques:   weight shift assistance provided   heels elevated off bed  Head of Bed ([facility]) Positioning: HOB at 30-45 degrees  Pressure Reduction Devices:   specialty bed utilized   heel offloading device utilized   foam padding utilized  Skin Protection:   tubing/devices free from skin contact   incontinence pads utilized  Taken 3/23/2024 1400 by Ed Feldman RN  Head of Bed ([facility]) Positioning: HOB at 30-45 degrees  Taken 3/23/2024 1200 by Ed Feldman RN  Pressure Reduction Techniques:   weight shift assistance provided   heels elevated off bed  Head of Bed ([facility]) Positioning: HOB at 30-45 degrees  Pressure Reduction Devices:   specialty bed utilized   heel offloading device utilized   positioning supports utilized  Skin Protection:   tubing/devices free from skin contact   skin-to-skin areas padded   skin-to-device areas padded   incontinence pads utilized   drying agents applied  Taken 3/23/2024 1000 by Ed Feldman RN  Head of Bed ([facility]) Positioning: HOB at 30-45 degrees  Taken 3/23/2024 0800 by Ed Feldman RN  Pressure Reduction Techniques: weight shift assistance provided  Head of Bed ([facility]) Positioning: HOB at 30-45 degrees  Pressure Reduction Devices:   specialty bed utilized   positioning supports utilized  Skin Protection:   tubing/devices free from skin contact   skin-to-skin areas padded   skin-to-device areas padded   drying agents applied   incontinence pads utilized     Problem: Fall Injury Risk  Goal: Absence of Fall and Fall-Related Injury  Intervention: Identify and Manage Contributors  Recent Flowsheet  Documentation  Taken 3/23/2024 1600 by Ed Feldman RN  Medication Review/Management: medications reviewed  Taken 3/23/2024 1400 by Ed Feldman RN  Medication Review/Management: medications reviewed  Taken 3/23/2024 1200 by Ed Feldman RN  Medication Review/Management: medications reviewed  Taken 3/23/2024 1000 by Ed Feldman RN  Medication Review/Management: medications reviewed  Taken 3/23/2024 0800 by Ed Feldman RN  Medication Review/Management: medications reviewed     Problem: Fall Injury Risk  Goal: Absence of Fall and Fall-Related Injury  Intervention: Promote Injury-Free Environment  Recent Flowsheet Documentation  Taken 3/23/2024 1600 by Ed Feldmna RN  Safety Promotion/Fall Prevention:   safety round/check completed   activity supervised  Taken 3/23/2024 1400 by Ed Feldman RN  Safety Promotion/Fall Prevention:   safety round/check completed   activity supervised  Taken 3/23/2024 1200 by Ed Feldman RN  Safety Promotion/Fall Prevention:   safety round/check completed   activity supervised  Taken 3/23/2024 1000 by Ed Feldman RN  Safety Promotion/Fall Prevention:   safety round/check completed   activity supervised  Taken 3/23/2024 0800 by Ed Feldman RN  Safety Promotion/Fall Prevention:   safety round/check completed   activity supervised     Problem: Adult Inpatient Plan of Care  Goal: Absence of Hospital-Acquired Illness or Injury  Intervention: Prevent Skin Injury  Recent Flowsheet Documentation  Taken 3/23/2024 1600 by Ed Feldman RN  Body Position:   weight shifting   right  Skin Protection:   tubing/devices free from skin contact   incontinence pads utilized  Taken 3/23/2024 1400 by Ed Feldman RN  Body Position:   weight shifting   left  Taken 3/23/2024 1200 by Ed Feldman RN  Body Position:   weight shifting   neutral body alignment  Skin Protection:   tubing/devices free from skin contact   skin-to-skin areas padded   skin-to-device  areas padded   incontinence pads utilized   drying agents applied  Taken 3/23/2024 1000 by Ed Feldman RN  Body Position:   weight shifting   right  Taken 3/23/2024 0800 by Ed Feldman RN  Body Position:   weight shifting   left  Skin Protection:   tubing/devices free from skin contact   skin-to-skin areas padded   skin-to-device areas padded   drying agents applied   incontinence pads utilized     Problem: Adult Inpatient Plan of Care  Goal: Absence of Hospital-Acquired Illness or Injury  Intervention: Prevent and Manage VTE (Venous Thromboembolism) Risk  Recent Flowsheet Documentation  Taken 3/23/2024 1600 by Ed Feldman RN  Activity Management: bedrest  VTE Prevention/Management:   sequential compression devices on   bilateral  Range of Motion: ROM (range of motion) performed  Taken 3/23/2024 1400 by Ed Feldman RN  Activity Management: bedrest  Taken 3/23/2024 1200 by Ed Feldman RN  Activity Management: bedrest  VTE Prevention/Management:   sequential compression devices on   bilateral  Taken 3/23/2024 1000 by Ed Feldman RN  Activity Management: bedrest  Taken 3/23/2024 0800 by Ed Feldman RN  Activity Management: bedrest  VTE Prevention/Management:   sequential compression devices on   bilateral  Range of Motion: ROM (range of motion) performed     Problem: Infection Progression (Sepsis/Septic Shock)  Goal: Absence of Infection Signs and Symptoms  Intervention: Initiate Sepsis Management  Recent Flowsheet Documentation  Taken 3/23/2024 1600 by Ed Feldman RN  Infection Prevention:   environmental surveillance performed   hand hygiene promoted  Isolation Precautions:   contact   precautions maintained  Taken 3/23/2024 1400 by Ed Feldman RN  Infection Prevention:   environmental surveillance performed   hand hygiene promoted  Isolation Precautions:   contact   precautions maintained  Taken 3/23/2024 1200 by Ed Feldman RN  Infection Prevention:   environmental  surveillance performed   hand hygiene promoted  Isolation Precautions:   contact   precautions maintained  Taken 3/23/2024 1000 by Ed Feldman RN  Infection Prevention:   environmental surveillance performed   hand hygiene promoted  Isolation Precautions:   contact   precautions maintained  Taken 3/23/2024 0800 by Ed Feldman RN  Infection Prevention:   environmental surveillance performed   hand hygiene promoted  Isolation Precautions:   contact   precautions maintained

## 2024-03-23 NOTE — PROGRESS NOTES
INTENSIVIST   PROGRESS NOTE     Hospital:  LOS: 13 days     Mr. Omkar Nava, 68 y.o. male is followed for a Chief Complaint of: Respiratory failure, pneumonia      Subjective   S     Interval History:  No acute events.     Follow simple commands from time to time.    Temp  Min: 99.1 °F (37.3 °C)  Max: 101.3 °F (38.5 °C)      The patient's relevant past medical, surgical and social history were reviewed and updated in Epic as appropriate.        History     Last Reviewed by Salo Arellano MD on 3/23/2024 at  1:36 PM    Sections Reviewed    Medical, Surgical, Family, Tobacco, Alcohol, Drug Use, Sexual Activity,   Social Documentation    Problem list reviewed by Salo Arellano MD on 3/23/2024 at  1:36 PM  Medicines reviewed by Salo Arellano MD on 3/23/2024 at  1:36 PM  Allergies reviewed by Salo Arellano MD on 3/23/2024 at  1:36 PM          OBJECTIVE     Vitals:  Temp: (!) 101.3 °F (38.5 °C) (24 1200) Temp  Min: 99.1 °F (37.3 °C)  Max: 101.3 °F (38.5 °C)   Temp core:      BP: 140/55 (24 1300) BP  Min: 116/42  Max: 161/60   MAP (non-invasive) Noninvasive MAP (mmHg): 82 (24 1300) Noninvasive MAP (mmHg)  Av.7  Min: 28  Max: 138   Pulse: 62 (24 1352) Pulse  Min: 62  Max: 70   Resp: 17 (24 1200) Resp  Min: 17  Max: 22   SpO2: 100 % (24 1352) SpO2  Min: 95 %  Max: 100 %   Device: ventilator (24 1351)    Flow Rate: 35 (24 0500) No data recorded         24  0600 24  06   Weight: 99 kg (218 lb 4.1 oz) 98.2 kg (216 lb 7.9 oz)        Intake/Ouptut 24 hrs (7:00AM - 6:59 AM)  Intake & Output (last 2 days)          0701   07 0701   07 0701   0700    I.V. (mL/kg) 632.8 (6.4) 692.5 (7.1) 196.6 (2)    Other 708 850 180    NG/ 964 196    IV Piggyback 197.2 100     Total Intake(mL/kg) 2342 (23.8) 2606.5 (26.5) 572.6 (5.8)    Urine (mL/kg/hr) 3200 (1.4) 2960 (1.3) 850 (1.2)    Stool 600 400 200    Total Output 3800 3360  1050    Net -1458 -753.5 -477.4           Stool Unmeasured Occurrence 6 x 3 x             Medications (drips):  dexmedetomidine, Last Rate: 1 mcg/kg/hr (03/23/24 1254)        Invasive Mechanical Ventilator   Settings: Observed:   Mode: VC+/AC (03/23/24 1103)    Resp Rate (Set): 14 (03/23/24 1103) Resp Rate (Observed) Vent: 18 (03/23/24 1300)   Vt (Set, mL): 420 mL (03/23/24 1103) Vt, Exp (observed, mL): 261 mL (03/23/24 1103)    Minute Ventilation (L/min) (Obs): 8.87 L/min (03/23/24 1103)    I:E Ratio (Obs): 1:1.5 (03/23/24 1103)       FiO2 (%): 30 % (03/23/24 1103) Plateau Pressure (cm H2O): (S) 20 cm H2O (03/23/24 0732)   PEEP/CPAP (cm H2O): 0 cm H20 (03/23/24 1103) Driving Pressure (cm H2O): 14.7 cm H2O (03/23/24 0732)    Static Compliance (L/cm H2O): 28 (03/22/24 1922)      Physical Examination  Telemetry:  Rhythm: normal sinus rhythm (03/23/24 1200)         Constitutional:  No acute distress.   Cardiovascular: RRR.    Respiratory: Normal breath sounds  No adventitious sounds   Abdominal:  Soft with no tenderness.   Extremities: Edema   Neurological:   Sedated.  Best Eye Response: 3-->(E3) to speech (03/23/24 1200)  Best Motor Response: 5-->(M5) localizes pain (03/23/24 1200)  Best Verbal Response: 1-->(V1) none (03/23/24 1200)  Crescencio Coma Scale Score: 9 (03/23/24 1200)     Results Reviewed:  Laboratory  Microbiology  Radiology  Pathology    Hematology:  Results from last 7 days   Lab Units 03/23/24  0421 03/22/24  0424 03/21/24  0449   WBC 10*3/mm3 11.48* 10.73 13.58*   HEMOGLOBIN g/dL 8.2* 8.2* 8.6*   MCV fL 90.5 91.1 91.0   PLATELETS 10*3/mm3 471* 414 381     Results from last 7 days   Lab Units 03/18/24  0322 03/17/24  0314   NEUTROS ABS 10*3/mm3 7.52* 7.98*   LYMPHS ABS 10*3/mm3 0.99 0.75   EOS ABS 10*3/mm3 0.12 0.10     Chemistry:  Estimated Creatinine Clearance: 76.7 mL/min (by C-G formula based on SCr of 1.15 mg/dL).    Results from last 7 days   Lab Units 03/23/24  0420 03/22/24  1753 03/22/24  0424    SODIUM mmol/L 145  --  147*   POTASSIUM mmol/L 3.9 4.5 3.4*   CHLORIDE mmol/L 111*  --  115*   CO2 mmol/L 28.0  --  24.0   BUN mg/dL 38*  --  38*   CREATININE mg/dL 1.15  --  1.15   GLUCOSE mg/dL 191*  --  150*     Results from last 7 days   Lab Units 03/23/24  1330 03/23/24  0420 03/22/24  1753 03/22/24  0424   CALCIUM mg/dL  --  7.9*  --  7.6*   MAGNESIUM mg/dL  --  2.1  --  2.0   PHOSPHORUS mg/dL 2.8 2.1*   < > 1.9*    < > = values in this interval not displayed.     COVID-19  Lab Results   Component Value Date    COVID19 Not Detected 03/10/2024    COVID19 Not Detected 03/10/2024    COVID19 Not Detected 02/05/2024    COVID19 Not Detected 02/05/2024       Arterial Blood Gases:  Results from last 7 days   Lab Units 03/20/24  0343 03/19/24  0427 03/18/24  0426   PH, ARTERIAL pH units 7.441 7.363 7.387   PCO2, ARTERIAL mm Hg 39.1 46.8* 44.2   PO2 ART mm Hg 77.6* 61.2* 58.7*   FIO2 % 50 45 50     Images:  XR Chest 1 View    Result Date: 3/23/2024  Impression: Unchanged appropriate position of endotracheal tube. Similar small bilateral pleural effusions and bibasilar atelectasis. Slightly improved mixed interstitial and airspace disease. Electronically Signed: Anjum Prado MD  3/23/2024 9:16 AM EDT  Workstation ID: [id number]    XR Chest 1 View    Result Date: 3/22/2024  Impression: 1. ET tube remains in good position at the level of the clavicles. 2. Diffuse and extensive pulmonary interstitial disease unchanged. Electronically Signed: Wisam Oquendo MD  3/22/2024 8:07 AM EDT  Workstation ID: [id number]     Echo:  Results for orders placed during the hospital encounter of 01/15/24    Adult Transthoracic Echo Complete With Contrast if Necessary Per Protocol    Interpretation Summary    Left ventricular ejection fraction appears to be 56 - 60%.    Left ventricular wall thickness is consistent with hypertrophy.    Estimated right ventricular systolic pressure from tricuspid regurgitation is mildly elevated (35-45 mmHg).     There is a small (1-2cm) pericardial effusion.    No evidence for pericardial tamponade      Results: Reviewed.  I reviewed the patient's new laboratory and imaging results.  I independently reviewed the patient's new images.    Medications: Reviewed.    Assessment   A/P     Hospital:  LOS: 13 days   ICU: 13d 1h     Active Hospital Problems    Diagnosis  POA    **Acute respiratory failure with hypoxemia [J96.01]  Yes    Acute kidney injury superimposed on chronic kidney disease [N17.9, N18.9]  Yes    AMS (altered mental status) [R41.82]  Yes    Dysphagia [R13.10]  Yes    Dementia [F03.90]  Yes    Protein calorie malnutrition [E46]  Yes    Neurocognitive disorder [R41.9]  Yes    Essential hypertension [I10]  Yes    Type 2 diabetes mellitus with diabetic chronic kidney disease [E11.22]  Yes    Iron deficiency anemia [D50.9]  Yes     Formatting of this note might be different from the original.   Stable Hb , weekly labs    -Continue iron sulplement daily.       Omkar is a 68 y.o. male admitted on 3/10/2024 with Healthcare-associated pneumonia [J18.9]  Acute respiratory failure with hypoxemia [J96.01]    Omkar Nava is a 68 y.o. male dependent for mobility, bathing and dressing, who is now admitted for his third admission in the last 2.5 months for an aspiration event with aspiration pneumonia.      On his previous admission a modified barium swallow did reveal moderate oral and mild pharyngeal dysphagia February 5, 2024.    Respiratory status deteriorated overnight on March 10 and he required intubation.    CTA of the chest was repeated and was negative for PE but revealed worsening bilateral pneumonia.      Assessment/Management/Treatment Plan:    Respiratory Failure  Intubated.  Invasive Mechanical Ventilation   ESBL Klebsiella pneumonia  Cardiovascular  HFpEF  HTN  Neuro  Dementia  Psychosis  Dysphagia  Renal  JAYY/CKD  Hematology  Chronic anemia, Iron deficiency  Nutrition Support     NG/OG Tube Nasojejunal 12 Fr  Right nostril-Tube Feeding Product: Novasource Renal  Novasource Renal (2.0 kcal/mL, 91 g protein/L, no fiber)  [REMOVED] NG/OG Tube 16 Fr Left nostril-Tube Feeding Rate (mL/hr): 0 mL/HR  NG/OG Tube Nasojejunal 12 Fr Right nostril-Tube Feeding Rate (mL/hr): 40 mL/HR (Based on a feeding duration of 20 h/d)      Lab Results   Component Value Date    CRP 0.59 (H) 08/02/2023    CRP 0.30 03/06/2023    CRP 0.95 (H) 12/07/2022    CRP 0.88 (H) 11/30/2022     Endocrine   Body mass index is 29.36 kg/m². Overweight: 25.0-29.9kg/m2   Type 2 diabetes.  Complicated by blindness.     Lab Results   Lab Value Date/Time    HGBA1C 7.00 (H) 10/16/2022 0432    HGBA1C 8.7 (H) 06/25/2022 0242    HGBA1C 13.4 04/14/2022 1058     Results from last 7 days   Lab Units 03/23/24  1129 03/23/24  0508 03/22/24  2308 03/22/24  2148 03/22/24  1743 03/22/24  1158 03/22/24  0516 03/21/24  2303   GLUCOSE mg/dL 201* 201* 194* 208* 207* 184* 154* 173*       Diet: NPO Diet NPO Type: Strict NPO   Advance Directives: Code Status and Medical Interventions:   Ordered at: 03/10/24 1302     Code Status (Patient has no pulse and is not breathing):    CPR (Attempt to Resuscitate)     Medical Interventions (Patient has pulse or is breathing):    Full Support        DVT prophylaxis:  Medical and mechanical DVT prophylaxis orders are present.         In brief:  RSBI: 122 (03/23/24 1103)  Thus not ready for SBT.  Vent Day # 13  Need to consider Trach soon.  Continue Enteral Nutrition   Continue Ertapenem x 10 days total, as scheduled.  Goal: Glucose < 180 mg/dL.   Insulin SQ  F/U labs in AM.  Disposition: Keep in ICU.    Plan of care and goals reviewed during interdisciplinary rounds.  I discussed the patient's findings and my recommendations with family and nursing staff    MDM:    Problem(s) High due to: Acute or Chronic illness or injury that may poses a threat to life or bodily function  Data: Moderate due to: Review of prior external records from each unique  source, Review or results of each unique test, and Ordering of each unique test    Moderate    [x] Primary Attending Intensive Care Medicine - Nutrition Support   [] Consultant    Copied text in this note has been reviewed and is accurate as of 03/23/24

## 2024-03-24 LAB
ALBUMIN SERPL-MCNC: 2.5 G/DL (ref 3.5–5.2)
ANION GAP SERPL CALCULATED.3IONS-SCNC: 7 MMOL/L (ref 5–15)
ARTERIAL PATENCY WRIST A: ABNORMAL
ATMOSPHERIC PRESS: ABNORMAL MM[HG]
BASE EXCESS BLDA CALC-SCNC: 3.4 MMOL/L (ref 0–2)
BASE EXCESS BLDA CALC-SCNC: 3.5 MMOL/L (ref 0–2)
BASE EXCESS BLDA CALC-SCNC: 4.3 MMOL/L (ref 0–2)
BASOPHILS # BLD AUTO: 0.03 10*3/MM3 (ref 0–0.2)
BASOPHILS NFR BLD AUTO: 0.3 % (ref 0–1.5)
BDY SITE: ABNORMAL
BODY TEMPERATURE: 37
BUN SERPL-MCNC: 42 MG/DL (ref 8–23)
BUN/CREAT SERPL: 30.2 (ref 7–25)
CALCIUM SPEC-SCNC: 8.1 MG/DL (ref 8.6–10.5)
CHLORIDE SERPL-SCNC: 114 MMOL/L (ref 98–107)
CO2 BLDA-SCNC: 28.6 MMOL/L (ref 22–33)
CO2 BLDA-SCNC: 29.9 MMOL/L (ref 22–33)
CO2 BLDA-SCNC: 30.2 MMOL/L (ref 22–33)
CO2 SERPL-SCNC: 28 MMOL/L (ref 22–29)
COHGB MFR BLD: 1 % (ref 0–2)
COHGB MFR BLD: 1.1 % (ref 0–2)
COHGB MFR BLD: 1.1 % (ref 0–2)
CREAT SERPL-MCNC: 1.39 MG/DL (ref 0.76–1.27)
DEPRECATED RDW RBC AUTO: 54 FL (ref 37–54)
EGFRCR SERPLBLD CKD-EPI 2021: 55.9 ML/MIN/1.73
EOSINOPHIL # BLD AUTO: 0.2 10*3/MM3 (ref 0–0.4)
EOSINOPHIL NFR BLD AUTO: 1.7 % (ref 0.3–6.2)
EPAP: 0
ERYTHROCYTE [DISTWIDTH] IN BLOOD BY AUTOMATED COUNT: 16.2 % (ref 12.3–15.4)
GLUCOSE BLDC GLUCOMTR-MCNC: 160 MG/DL (ref 70–130)
GLUCOSE BLDC GLUCOMTR-MCNC: 192 MG/DL (ref 70–130)
GLUCOSE BLDC GLUCOMTR-MCNC: 214 MG/DL (ref 70–130)
GLUCOSE SERPL-MCNC: 160 MG/DL (ref 65–99)
HCO3 BLDA-SCNC: 27.4 MMOL/L (ref 20–26)
HCO3 BLDA-SCNC: 28.5 MMOL/L (ref 20–26)
HCO3 BLDA-SCNC: 28.9 MMOL/L (ref 20–26)
HCT VFR BLD AUTO: 27.8 % (ref 37.5–51)
HCT VFR BLD CALC: 24.7 % (ref 38–51)
HCT VFR BLD CALC: 25 % (ref 38–51)
HCT VFR BLD CALC: 25.3 % (ref 38–51)
HGB BLD-MCNC: 8.3 G/DL (ref 13–17.7)
HGB BLDA-MCNC: 8.1 G/DL (ref 13.5–17.5)
HGB BLDA-MCNC: 8.2 G/DL (ref 13.5–17.5)
HGB BLDA-MCNC: 8.3 G/DL (ref 13.5–17.5)
IMM GRANULOCYTES # BLD AUTO: 0.1 10*3/MM3 (ref 0–0.05)
IMM GRANULOCYTES NFR BLD AUTO: 0.8 % (ref 0–0.5)
INHALED O2 CONCENTRATION: 28 %
INHALED O2 CONCENTRATION: 30 %
INHALED O2 CONCENTRATION: 30 %
IPAP: 0
LYMPHOCYTES # BLD AUTO: 1.37 10*3/MM3 (ref 0.7–3.1)
LYMPHOCYTES NFR BLD AUTO: 11.6 % (ref 19.6–45.3)
MCH RBC QN AUTO: 27.3 PG (ref 26.6–33)
MCHC RBC AUTO-ENTMCNC: 29.9 G/DL (ref 31.5–35.7)
MCV RBC AUTO: 91.4 FL (ref 79–97)
METHGB BLD QL: 0.1 % (ref 0–1.5)
METHGB BLD QL: 0.2 % (ref 0–1.5)
METHGB BLD QL: 0.3 % (ref 0–1.5)
MODALITY: ABNORMAL
MONOCYTES # BLD AUTO: 0.83 10*3/MM3 (ref 0.1–0.9)
MONOCYTES NFR BLD AUTO: 7 % (ref 5–12)
NEUTROPHILS NFR BLD AUTO: 78.6 % (ref 42.7–76)
NEUTROPHILS NFR BLD AUTO: 9.31 10*3/MM3 (ref 1.7–7)
NRBC BLD AUTO-RTO: 0 /100 WBC (ref 0–0.2)
OXYHGB MFR BLDV: 93.9 % (ref 94–99)
OXYHGB MFR BLDV: 95.3 % (ref 94–99)
OXYHGB MFR BLDV: 96.9 % (ref 94–99)
PAW @ PEAK INSP FLOW SETTING VENT: 0 CMH2O
PAW @ PEAK INSP FLOW SETTING VENT: 0 CMH2O
PAW @ PEAK INSP FLOW SETTING VENT: 15 CMH2O
PCO2 BLDA: 38.4 MM HG (ref 35–45)
PCO2 BLDA: 42.3 MM HG (ref 35–45)
PCO2 BLDA: 44.6 MM HG (ref 35–45)
PCO2 TEMP ADJ BLD: 38.4 MM HG (ref 35–48)
PCO2 TEMP ADJ BLD: 42.3 MM HG (ref 35–48)
PCO2 TEMP ADJ BLD: 44.6 MM HG (ref 35–48)
PEEP RESPIRATORY: 5 CM[H2O]
PEEP RESPIRATORY: 5 CM[H2O]
PH BLDA: 7.41 PH UNITS (ref 7.35–7.45)
PH BLDA: 7.44 PH UNITS (ref 7.35–7.45)
PH BLDA: 7.46 PH UNITS (ref 7.35–7.45)
PH, TEMP CORRECTED: 7.41 PH UNITS
PH, TEMP CORRECTED: 7.44 PH UNITS
PH, TEMP CORRECTED: 7.46 PH UNITS
PHOSPHATE SERPL-MCNC: 3.2 MG/DL (ref 2.5–4.5)
PLATELET # BLD AUTO: 498 10*3/MM3 (ref 140–450)
PMV BLD AUTO: 11.6 FL (ref 6–12)
PO2 BLDA: 70.3 MM HG (ref 83–108)
PO2 BLDA: 81.7 MM HG (ref 83–108)
PO2 BLDA: 89.9 MM HG (ref 83–108)
PO2 TEMP ADJ BLD: 70.3 MM HG (ref 83–108)
PO2 TEMP ADJ BLD: 81.7 MM HG (ref 83–108)
PO2 TEMP ADJ BLD: 89.9 MM HG (ref 83–108)
POTASSIUM SERPL-SCNC: 4.5 MMOL/L (ref 3.5–5.2)
PSV: 10 CMH2O
RBC # BLD AUTO: 3.04 10*6/MM3 (ref 4.14–5.8)
SODIUM SERPL-SCNC: 149 MMOL/L (ref 136–145)
TOTAL RATE: 0 BREATHS/MINUTE
VENTILATOR MODE: ABNORMAL
VENTILATOR MODE: ABNORMAL
VT ON VENT VENT: 0.42 ML
WBC NRBC COR # BLD AUTO: 11.84 10*3/MM3 (ref 3.4–10.8)

## 2024-03-24 PROCEDURE — 85025 COMPLETE CBC W/AUTO DIFF WBC: CPT | Performed by: INTERNAL MEDICINE

## 2024-03-24 PROCEDURE — 99232 SBSQ HOSP IP/OBS MODERATE 35: CPT | Performed by: INTERNAL MEDICINE

## 2024-03-24 PROCEDURE — 94003 VENT MGMT INPAT SUBQ DAY: CPT

## 2024-03-24 PROCEDURE — 82948 REAGENT STRIP/BLOOD GLUCOSE: CPT

## 2024-03-24 PROCEDURE — 80069 RENAL FUNCTION PANEL: CPT | Performed by: INTERNAL MEDICINE

## 2024-03-24 PROCEDURE — 63710000001 INSULIN REGULAR HUMAN PER 5 UNITS: Performed by: INTERNAL MEDICINE

## 2024-03-24 PROCEDURE — 36600 WITHDRAWAL OF ARTERIAL BLOOD: CPT

## 2024-03-24 PROCEDURE — 63710000001 INSULIN DETEMIR PER 5 UNITS: Performed by: INTERNAL MEDICINE

## 2024-03-24 PROCEDURE — 94761 N-INVAS EAR/PLS OXIMETRY MLT: CPT

## 2024-03-24 PROCEDURE — 94799 UNLISTED PULMONARY SVC/PX: CPT

## 2024-03-24 PROCEDURE — 82375 ASSAY CARBOXYHB QUANT: CPT

## 2024-03-24 PROCEDURE — 0 DEXTROSE 5 % SOLUTION: Performed by: INTERNAL MEDICINE

## 2024-03-24 PROCEDURE — 82805 BLOOD GASES W/O2 SATURATION: CPT

## 2024-03-24 PROCEDURE — 83050 HGB METHEMOGLOBIN QUAN: CPT

## 2024-03-24 PROCEDURE — 25010000002 ERTAPENEM PER 500 MG: Performed by: INTERNAL MEDICINE

## 2024-03-24 PROCEDURE — 25010000002 HEPARIN (PORCINE) PER 1000 UNITS: Performed by: INTERNAL MEDICINE

## 2024-03-24 RX ORDER — DEXTROSE MONOHYDRATE 50 MG/ML
100 INJECTION, SOLUTION INTRAVENOUS CONTINUOUS
Status: DISCONTINUED | OUTPATIENT
Start: 2024-03-24 | End: 2024-03-25

## 2024-03-24 RX ADMIN — ERTAPENEM 1000 MG: 1 INJECTION INTRAMUSCULAR; INTRAVENOUS at 16:29

## 2024-03-24 RX ADMIN — DEXMEDETOMIDINE HYDROCHLORIDE 0.8 MCG/KG/HR: 400 INJECTION, SOLUTION INTRAVENOUS at 10:59

## 2024-03-24 RX ADMIN — DEXTROSE MONOHYDRATE 100 ML/HR: 50 INJECTION, SOLUTION INTRAVENOUS at 18:24

## 2024-03-24 RX ADMIN — HUMAN INSULIN 2 UNITS: 100 INJECTION, SOLUTION SUBCUTANEOUS at 11:39

## 2024-03-24 RX ADMIN — TIMOLOL MALEATE 1 DROP: 5 SOLUTION/ DROPS OPHTHALMIC at 09:29

## 2024-03-24 RX ADMIN — ALBUTEROL SULFATE 2.5 MG: 2.5 SOLUTION RESPIRATORY (INHALATION) at 13:59

## 2024-03-24 RX ADMIN — TIMOLOL MALEATE 1 DROP: 5 SOLUTION/ DROPS OPHTHALMIC at 21:03

## 2024-03-24 RX ADMIN — INSULIN DETEMIR 6 UNITS: 100 INJECTION, SOLUTION SUBCUTANEOUS at 21:03

## 2024-03-24 RX ADMIN — HYDRALAZINE HYDROCHLORIDE 100 MG: 50 TABLET ORAL at 06:16

## 2024-03-24 RX ADMIN — HUMAN INSULIN 2 UNITS: 100 INJECTION, SOLUTION SUBCUTANEOUS at 06:17

## 2024-03-24 RX ADMIN — DEXTROSE MONOHYDRATE 100 ML/HR: 50 INJECTION, SOLUTION INTRAVENOUS at 21:25

## 2024-03-24 RX ADMIN — FLUOXETINE 20 MG: 20 CAPSULE ORAL at 11:39

## 2024-03-24 RX ADMIN — CARVEDILOL 12.5 MG: 12.5 TABLET, FILM COATED ORAL at 21:03

## 2024-03-24 RX ADMIN — ALBUTEROL SULFATE 2.5 MG: 2.5 SOLUTION RESPIRATORY (INHALATION) at 20:08

## 2024-03-24 RX ADMIN — HUMAN INSULIN 4 UNITS: 100 INJECTION, SOLUTION SUBCUTANEOUS at 18:23

## 2024-03-24 RX ADMIN — PANTOPRAZOLE SODIUM 40 MG: 40 INJECTION, POWDER, FOR SOLUTION INTRAVENOUS at 21:02

## 2024-03-24 RX ADMIN — CLONIDINE HYDROCHLORIDE 0.2 MG: 0.2 TABLET ORAL at 21:03

## 2024-03-24 RX ADMIN — HYDRALAZINE HYDROCHLORIDE 100 MG: 50 TABLET ORAL at 14:56

## 2024-03-24 RX ADMIN — TORSEMIDE 5 MG: 10 TABLET ORAL at 09:33

## 2024-03-24 RX ADMIN — Medication 30 ML: at 11:39

## 2024-03-24 RX ADMIN — FLUOXETINE 20 MG: 20 CAPSULE ORAL at 22:08

## 2024-03-24 RX ADMIN — PANTOPRAZOLE SODIUM 40 MG: 40 INJECTION, POWDER, FOR SOLUTION INTRAVENOUS at 09:33

## 2024-03-24 RX ADMIN — HEPARIN SODIUM 5000 UNITS: 5000 INJECTION INTRAVENOUS; SUBCUTANEOUS at 21:02

## 2024-03-24 RX ADMIN — CLONIDINE HYDROCHLORIDE 0.2 MG: 0.2 TABLET ORAL at 14:56

## 2024-03-24 RX ADMIN — HUMAN INSULIN 4 UNITS: 100 INJECTION, SOLUTION SUBCUTANEOUS at 06:17

## 2024-03-24 RX ADMIN — BRIMONIDINE TARTRATE 1 DROP: 2 SOLUTION/ DROPS OPHTHALMIC at 16:29

## 2024-03-24 RX ADMIN — BRIMONIDINE TARTRATE 1 DROP: 2 SOLUTION/ DROPS OPHTHALMIC at 21:03

## 2024-03-24 RX ADMIN — INSULIN DETEMIR 6 UNITS: 100 INJECTION, SOLUTION SUBCUTANEOUS at 09:28

## 2024-03-24 RX ADMIN — CLONIDINE HYDROCHLORIDE 0.2 MG: 0.2 TABLET ORAL at 06:17

## 2024-03-24 RX ADMIN — HEPARIN SODIUM 5000 UNITS: 5000 INJECTION INTRAVENOUS; SUBCUTANEOUS at 06:17

## 2024-03-24 RX ADMIN — CARVEDILOL 12.5 MG: 12.5 TABLET, FILM COATED ORAL at 09:28

## 2024-03-24 RX ADMIN — TERAZOSIN HYDROCHLORIDE ANHYDROUS 5 MG: 5 CAPSULE ORAL at 09:33

## 2024-03-24 RX ADMIN — AMLODIPINE BESYLATE 10 MG: 10 TABLET ORAL at 09:28

## 2024-03-24 RX ADMIN — TERAZOSIN HYDROCHLORIDE ANHYDROUS 5 MG: 5 CAPSULE ORAL at 21:03

## 2024-03-24 RX ADMIN — 0.12% CHLORHEXIDINE GLUCONATE 15 ML: 1.2 RINSE ORAL at 09:28

## 2024-03-24 RX ADMIN — HUMAN INSULIN 4 UNITS: 100 INJECTION, SOLUTION SUBCUTANEOUS at 11:39

## 2024-03-24 RX ADMIN — HYDRALAZINE HYDROCHLORIDE 100 MG: 50 TABLET ORAL at 22:08

## 2024-03-24 RX ADMIN — HEPARIN SODIUM 5000 UNITS: 5000 INJECTION INTRAVENOUS; SUBCUTANEOUS at 14:56

## 2024-03-24 RX ADMIN — ALBUTEROL SULFATE 2.5 MG: 2.5 SOLUTION RESPIRATORY (INHALATION) at 07:39

## 2024-03-24 RX ADMIN — DEXMEDETOMIDINE HYDROCHLORIDE 0.8 MCG/KG/HR: 400 INJECTION, SOLUTION INTRAVENOUS at 05:11

## 2024-03-24 RX ADMIN — BRIMONIDINE TARTRATE 1 DROP: 2 SOLUTION/ DROPS OPHTHALMIC at 09:29

## 2024-03-24 RX ADMIN — ARIPIPRAZOLE 5 MG: 10 TABLET ORAL at 09:28

## 2024-03-24 NOTE — PROGRESS NOTES
"   LOS: 14 days    Patient Care Team:  Deann Cao MD as PCP - General (Internal Medicine)    Consulted for: Acute on chronic renal failure, hyperkalemia  68-year-old CKD stage III admitted with shortness of breath, choking of food at NH, admitted with JAYY, hyperkalemia, acute respiratory failure now intubated, anemia.  Later developed oliguria with increasing BUN/creatinine.  Patient was treated with Lokelma, bicarb, dextrose, insulin, calcium gluconate.    Subjective     Intubated and sedated.   Not on pressors.   No acute events       Review of Systems:    Review of systems could not be obtained due to  patient intubated. emergent nature of case.    Objective     Vital Sign Min/Max for last 24 hours  Temp  Min: 97.7 °F (36.5 °C)  Max: 101.3 °F (38.5 °C)   BP  Min: 122/56  Max: 150/77   Pulse  Min: 61  Max: 75   Resp  Min: 16  Max: 20   SpO2  Min: 93 %  Max: 100 %   Flow (L/min)  Min: 30  Max: 30   No data recorded     Flowsheet Rows      Flowsheet Row First Filed Value   Admission Height 151.9 cm (59.8\") Documented at 03/10/2024 0910   Admission Weight 84.8 kg (187 lb) Documented at 03/10/2024 0910            No intake/output data recorded.  I/O last 3 completed shifts:  In: 3469.5 [I.V.:898.5; Other:990; NG/GT:1481; IV Piggyback:100]  Out: 4865 [Urine:3715; Stool:1150]    Physical Exam:  Gen: intubated and sedated.    HENT: NC, AT  NECK: Supple, ETT in place  LUNGS: Coarse breath sound on vent, non labored respirtation   CVS: S1/S2 audible, RRR, no murmur   Abd: Soft, NT, ND, BS+   Ext: Trace edema, no cyanosis   CNS: Intubated and sedated.   Psy: Intubated and sedated.   Skin: Warm, dry and intact  : + Story catheter in place.     WBC WBC   Date Value Ref Range Status   03/24/2024 11.84 (H) 3.40 - 10.80 10*3/mm3 Final   03/23/2024 11.48 (H) 3.40 - 10.80 10*3/mm3 Final   03/22/2024 10.73 3.40 - 10.80 10*3/mm3 Final      HGB Hemoglobin   Date Value Ref Range Status   03/24/2024 8.3 (L) 13.0 - 17.7 " "g/dL Final   03/23/2024 8.2 (L) 13.0 - 17.7 g/dL Final   03/22/2024 8.2 (L) 13.0 - 17.7 g/dL Final      HCT Hematocrit   Date Value Ref Range Status   03/24/2024 27.8 (L) 37.5 - 51.0 % Final   03/23/2024 26.7 (L) 37.5 - 51.0 % Final   03/22/2024 26.7 (L) 37.5 - 51.0 % Final      Platlets No results found for: \"LABPLAT\"   MCV MCV   Date Value Ref Range Status   03/24/2024 91.4 79.0 - 97.0 fL Final   03/23/2024 90.5 79.0 - 97.0 fL Final   03/22/2024 91.1 79.0 - 97.0 fL Final          Sodium Sodium   Date Value Ref Range Status   03/24/2024 149 (H) 136 - 145 mmol/L Final   03/23/2024 145 136 - 145 mmol/L Final   03/22/2024 147 (H) 136 - 145 mmol/L Final      Potassium Potassium   Date Value Ref Range Status   03/24/2024 4.5 3.5 - 5.2 mmol/L Final   03/23/2024 3.9 3.5 - 5.2 mmol/L Final     Comment:     Slight hemolysis detected by analyzer. Result may be falsely elevated.   03/22/2024 4.5 3.5 - 5.2 mmol/L Final     Comment:     Slight hemolysis detected by analyzer. Result may be falsely elevated.   03/22/2024 3.4 (L) 3.5 - 5.2 mmol/L Final      Chloride Chloride   Date Value Ref Range Status   03/24/2024 114 (H) 98 - 107 mmol/L Final   03/23/2024 111 (H) 98 - 107 mmol/L Final   03/22/2024 115 (H) 98 - 107 mmol/L Final      CO2 CO2   Date Value Ref Range Status   03/24/2024 28.0 22.0 - 29.0 mmol/L Final   03/23/2024 28.0 22.0 - 29.0 mmol/L Final   03/22/2024 24.0 22.0 - 29.0 mmol/L Final      BUN BUN   Date Value Ref Range Status   03/24/2024 42 (H) 8 - 23 mg/dL Final   03/23/2024 38 (H) 8 - 23 mg/dL Final   03/22/2024 38 (H) 8 - 23 mg/dL Final      Creatinine Creatinine   Date Value Ref Range Status   03/24/2024 1.39 (H) 0.76 - 1.27 mg/dL Final   03/23/2024 1.15 0.76 - 1.27 mg/dL Final   03/22/2024 1.15 0.76 - 1.27 mg/dL Final      Calcium Calcium   Date Value Ref Range Status   03/24/2024 8.1 (L) 8.6 - 10.5 mg/dL Final   03/23/2024 7.9 (L) 8.6 - 10.5 mg/dL Final   03/22/2024 7.6 (L) 8.6 - 10.5 mg/dL Final      PO4 No " "results found for: \"CAPO4\"   Albumin Albumin   Date Value Ref Range Status   03/24/2024 2.5 (L) 3.5 - 5.2 g/dL Final        Magnesium Magnesium   Date Value Ref Range Status   03/23/2024 2.1 1.6 - 2.4 mg/dL Final   03/22/2024 2.0 1.6 - 2.4 mg/dL Final        Uric Acid No results found for: \"URICACID\"        Results Review:     I reviewed the patient's new clinical results.    albuterol, 2.5 mg, Nebulization, Q6H - RT  amLODIPine, 10 mg, Nasogastric, Q24H  ARIPiprazole, 5 mg, Nasogastric, Daily  brimonidine, 1 drop, Both Eyes, TID   And  timolol, 1 drop, Both Eyes, BID  carvedilol, 12.5 mg, Nasogastric, Q12H  chlorhexidine, 15 mL, Mouth/Throat, Q12H  cloNIDine, 0.2 mg, Nasogastric, Q8H  ertapenem, 1,000 mg, Intravenous, Q24H  FLUoxetine, 20 mg, Nasogastric, Q12H  heparin (porcine), 5,000 Units, Subcutaneous, Q8H  hydrALAZINE, 100 mg, Nasogastric, Q8H  insulin detemir, 6 Units, Subcutaneous, Q12H  insulin regular, 2-9 Units, Subcutaneous, Q6H  insulin regular, 4 Units, Subcutaneous, Q6H  pantoprazole, 40 mg, Intravenous, Q12H  ProSource No Carb, 30 mL, Nasogastric, Daily  terazosin, 5 mg, Nasogastric, Q12H  torsemide, 5 mg, Nasogastric, Daily      dexmedetomidine, 0.2-1.5 mcg/kg/hr (Order-Specific), Last Rate: 0.8 mcg/kg/hr (03/24/24 0600)        Medication Review: Reviewed    Results from last 7 days   Lab Units 03/24/24  0420 03/23/24  0421 03/23/24  0420 03/22/24  1753 03/22/24  0424 03/21/24  0449   SODIUM mmol/L 149*  --  145  --  147* 148*   POTASSIUM mmol/L 4.5  --  3.9 4.5 3.4* 3.6   CHLORIDE mmol/L 114*  --  111*  --  115* 114*   CO2 mmol/L 28.0  --  28.0  --  24.0 27.0   BUN mg/dL 42*  --  38*  --  38* 48*   CREATININE mg/dL 1.39*  --  1.15  --  1.15 1.44*   CALCIUM mg/dL 8.1*  --  7.9*  --  7.6* 7.8*   ALBUMIN g/dL 2.5*  --   --   --   --   --    WBC 10*3/mm3 11.84* 11.48*  --   --  10.73 13.58*   HEMOGLOBIN g/dL 8.3* 8.2*  --   --  8.2* 8.6*   PLATELETS 10*3/mm3 498* 471*  --   --  414 381 "             Assessment & Plan       Acute type 1 respiratory failure    Essential hypertension    Neurocognitive disorder    AMS (altered mental status)    Dementia    Dysphagia    Iron deficiency anemia    Protein calorie malnutrition    Type 2 diabetes mellitus with diabetic chronic kidney disease    Acute kidney injury superimposed on chronic kidney disease    1.  Acute renal failure on CKD stage III acute rise in creatinine with recent event.  Decreased renal perfusion, hypoxia, IV contrast.  2.  Hyperkalemia multifactorial due to JAYY, respiratory failure, acidosis.  3.  CKD stage III - Baseline Scr 1.6- 1.8.  CT abdomen showed normal-sized kidneys with no stones or hydronephrosis.ultrasound revealed right kidney 12.1 cm, left kidney 11.3 cm without hydronephrosis.   4.  Respite failure on ventilator CT angio negative for pulmonary embolism  5.  Essential hypertension  6.  Altered mental status  7.  Bradycardia  8.  Dementia     Recommendations:  - Continue with current   - Pharmacy consult - to change IV meds fluid to D5W if possible   - Increase FW infusion to 20 ml/hr   - Keep MAP greater than 65 mmHg.  - Avoid nephrotoxic medications.  - Adjust medication according to new GFR.  High risk complex patient multiple medical problems.      Farshad Jeong MD  03/24/24  08:59 EDT

## 2024-03-24 NOTE — PROGRESS NOTES
INTENSIVIST   PROGRESS NOTE     Hospital:  LOS: 14 days     Mr. Omkar Nava, 68 y.o. male is followed for a Chief Complaint of: Respiratory failure, pneumonia      Subjective   S     Interval History:  Alert.     Good spirits.    Fevers last 24h.    Temp  Min: 97.7 °F (36.5 °C)  Max: 101.1 °F (38.4 °C)      The patient's relevant past medical, surgical and social history were reviewed and updated in Epic as appropriate.        History     Last Reviewed by Salo Arellano MD on 3/23/2024 at  1:36 PM    Sections Reviewed    Medical, Surgical, Family, Tobacco, Alcohol, Drug Use, Sexual Activity,   Social Documentation    Problem list reviewed by Salo Arellano MD on 3/23/2024 at  1:36 PM  Medicines reviewed by Salo Arellano MD on 3/23/2024 at  1:36 PM  Allergies reviewed by Salo Arellano MD on 3/23/2024 at  1:36 PM          OBJECTIVE     Vitals:  Temp: 97.7 °F (36.5 °C) (24 1600) Temp  Min: 97.7 °F (36.5 °C)  Max: 101.1 °F (38.4 °C)   Temp core:      BP: 121/54 (24 1700) BP  Min: 116/49  Max: 150/77   MAP (non-invasive) Noninvasive MAP (mmHg): 82 (24 1700) Noninvasive MAP (mmHg)  Av.4  Min: 28  Max: 138   Pulse: 65 (24 1700) Pulse  Min: 59  Max: 75   Resp: 21 (24 1600) Resp  Min: 16  Max: 21   SpO2: 94 % (24 1700) SpO2  Min: 93 %  Max: 100 %   Device: ventilator (24 1600)    Flow Rate: 30 (24 0800) Flow (L/min)  Min: 30  Max: 30         24  0600 24  06   Weight: 99 kg (218 lb 4.1 oz) 98.2 kg (216 lb 7.9 oz)        Intake/Ouptut 24 hrs (7:00AM - 6:59 AM)  Intake & Output (last 2 days)          0701   07 07 0700  0701   0700    I.V. (mL/kg) 692.5 (7.1) 663.3 (6.8) 285.8 (2.9)    Other 850 530 140    NG/ 994 482    IV Piggyback 100      Total Intake(mL/kg) 2606.5 (26.5) 2187.3 (22.3) 907.8 (9.2)    Urine (mL/kg/hr) 2960 (1.3) 2580 (1.1) 1575 (1.5)    Stool 400 1150     Total Output 3360 3730 1575    Net  -753.5 -1542.7 -667.2           Stool Unmeasured Occurrence 3 x              Medications (drips):  dexmedetomidine, Last Rate: 0.8 mcg/kg/hr (03/24/24 1059)  Pharmacy Consult        Invasive Mechanical Ventilator   Settings: Observed:   Mode: PS (03/24/24 1454)    Resp Rate (Set): 14 (03/24/24 0917) Resp Rate (Observed) Vent: 28 (03/24/24 1611)   Vt (Set, mL): 420 mL (03/24/24 0917) Vt, Exp (observed, mL): 346 mL (03/24/24 1611)    Minute Ventilation (L/min) (Obs): 9.12 L/min (03/24/24 1611)    I:E Ratio (Obs): 1:2 (03/24/24 1611)       FiO2 (%): 30 % (03/24/24 1611) Plateau Pressure (cm H2O): (S) 19 cm H2O (03/24/24 0739)   PEEP/CPAP (cm H2O): 5 cm H20 (03/24/24 1611) Driving Pressure (cm H2O): 14.2 cm H2O (03/24/24 0739)    Static Compliance (L/cm H2O): 29 (03/24/24 0739)      Physical Examination  Telemetry:  Rhythm: sinus arrhythmia (03/24/24 1200)         Constitutional:  No acute distress.   Cardiovascular: RRR.    Respiratory: Normal breath sounds  No adventitious sounds   Abdominal:  Soft with no tenderness.   Extremities: Edema   Neurological:   Awake.  Best Eye Response: 3-->(E3) to speech (03/24/24 1200)  Best Motor Response: 6-->(M6) obeys commands (03/24/24 1200)  Best Verbal Response: 1-->(V1) none (03/24/24 1200)  Crescencio Coma Scale Score: 10 (03/24/24 1200)     Results Reviewed:  Laboratory  Microbiology  Radiology  Pathology    Hematology:  Results from last 7 days   Lab Units 03/24/24  0420 03/23/24 0421 03/22/24 0424   WBC 10*3/mm3 11.84* 11.48* 10.73   HEMOGLOBIN g/dL 8.3* 8.2* 8.2*   MCV fL 91.4 90.5 91.1   PLATELETS 10*3/mm3 498* 471* 414     Results from last 7 days   Lab Units 03/24/24  0420 03/18/24  0322   NEUTROS ABS 10*3/mm3 9.31* 7.52*   LYMPHS ABS 10*3/mm3 1.37 0.99   EOS ABS 10*3/mm3 0.20 0.12     Chemistry:  Estimated Creatinine Clearance: 63.4 mL/min (A) (by C-G formula based on SCr of 1.39 mg/dL (H)).    Results from last 7 days   Lab Units 03/24/24  0420 03/23/24  0420   SODIUM  mmol/L 149* 145   POTASSIUM mmol/L 4.5 3.9   CHLORIDE mmol/L 114* 111*   CO2 mmol/L 28.0 28.0   BUN mg/dL 42* 38*   CREATININE mg/dL 1.39* 1.15   GLUCOSE mg/dL 160* 191*     Results from last 7 days   Lab Units 03/24/24  0420 03/23/24  1330 03/23/24  0420 03/22/24  1753 03/22/24  0424   CALCIUM mg/dL 8.1*  --  7.9*  --  7.6*   MAGNESIUM mg/dL  --   --  2.1  --  2.0   PHOSPHORUS mg/dL 3.2 2.8 2.1*   < > 1.9*    < > = values in this interval not displayed.     COVID-19  Lab Results   Component Value Date    COVID19 Not Detected 03/10/2024    COVID19 Not Detected 03/10/2024    COVID19 Not Detected 02/05/2024    COVID19 Not Detected 02/05/2024       Arterial Blood Gases:  Results from last 7 days   Lab Units 03/24/24  1725 03/24/24  1405 03/24/24  0347   PH, ARTERIAL pH units 7.414 7.442 7.463*   PCO2, ARTERIAL mm Hg 44.6 42.3 38.4   PO2 ART mm Hg 81.7* 70.3* 89.9   FIO2 % 28 30 30     Images:  XR Chest 1 View    Result Date: 3/23/2024  Impression: Unchanged appropriate position of endotracheal tube. Similar small bilateral pleural effusions and bibasilar atelectasis. Slightly improved mixed interstitial and airspace disease. Electronically Signed: Anjum Prado MD  3/23/2024 9:16 AM EDT  Workstation ID: [id number]     Echo:  Results for orders placed during the hospital encounter of 01/15/24    Adult Transthoracic Echo Complete With Contrast if Necessary Per Protocol    Interpretation Summary    Left ventricular ejection fraction appears to be 56 - 60%.    Left ventricular wall thickness is consistent with hypertrophy.    Estimated right ventricular systolic pressure from tricuspid regurgitation is mildly elevated (35-45 mmHg).    There is a small (1-2cm) pericardial effusion.    No evidence for pericardial tamponade      Results: Reviewed.  I reviewed the patient's new laboratory and imaging results.  I independently reviewed the patient's new images.    Medications: Reviewed.    Assessment   A/P     Hospital:  LOS: 14  days   ICU: 14d 5h     Active Hospital Problems    Diagnosis  POA    **Acute type 1 respiratory failure [J96.01]  Yes    Acute kidney injury superimposed on chronic kidney disease [N17.9, N18.9]  Yes    AMS (altered mental status) [R41.82]  Yes    Dysphagia [R13.10]  Yes    Dementia [F03.90]  Yes    Protein calorie malnutrition [E46]  Yes    Neurocognitive disorder [R41.9]  Yes    Essential hypertension [I10]  Yes    Type 2 diabetes mellitus with diabetic chronic kidney disease [E11.22]  Yes    Iron deficiency anemia [D50.9]  Yes     Formatting of this note might be different from the original.   Stable Hb , weekly labs    -Continue iron sulplement daily.       Omkar is a 68 y.o. male admitted on 3/10/2024 with Healthcare-associated pneumonia [J18.9]  Acute respiratory failure with hypoxemia [J96.01]    Omkar Nava is a 68 y.o. male dependent for mobility, bathing and dressing, who is now admitted for his third admission in the last 2.5 months for an aspiration event with aspiration pneumonia.      On his previous admission a modified barium swallow did reveal moderate oral and mild pharyngeal dysphagia February 5, 2024.    Respiratory status deteriorated overnight on March 10 and he required intubation.    CTA of the chest was repeated and was negative for PE but revealed worsening bilateral pneumonia.      Assessment/Management/Treatment Plan:    Respiratory Failure  Intubated.  Invasive Mechanical Ventilation   ESBL Klebsiella pneumonia  Cardiovascular  HFpEF  HTN  Neuro  Dementia  Psychosis  Dysphagia  Renal  JAYY/CKD  Hematology  Chronic anemia, Iron deficiency  Nutrition Support     NG/OG Tube Nasojejunal 12 Fr Right nostril-Tube Feeding Product: Novasource Renal  Novasource Renal (2.0 kcal/mL, 91 g protein/L, no fiber)  [REMOVED] NG/OG Tube 16 Fr Left nostril-Tube Feeding Rate (mL/hr): 0 mL/HR  NG/OG Tube Nasojejunal 12 Fr Right nostril-Tube Feeding Rate (mL/hr): 46 mL/HR (Based on a feeding duration of 20  h/d)      Lab Results   Component Value Date    CRP 0.59 (H) 08/02/2023    CRP 0.30 03/06/2023    CRP 0.95 (H) 12/07/2022    CRP 0.88 (H) 11/30/2022     Endocrine   Body mass index is 29.36 kg/m². Overweight: 25.0-29.9kg/m2   Type 2 diabetes.  Complicated by blindness.     Lab Results   Lab Value Date/Time    HGBA1C 7.00 (H) 10/16/2022 0432    HGBA1C 8.7 (H) 06/25/2022 0242    HGBA1C 13.4 04/14/2022 1058     Results from last 7 days   Lab Units 03/24/24  1115 03/24/24  0505 03/23/24  2319 03/23/24  2145 03/23/24  1729 03/23/24  1129 03/23/24  0508 03/22/24  2308   GLUCOSE mg/dL 192* 160* 171* 195* 174* 201* 201* 194*       Diet: NPO Diet NPO Type: Strict NPO   Advance Directives: Code Status and Medical Interventions:   Ordered at: 03/10/24 1302     Code Status (Patient has no pulse and is not breathing):    CPR (Attempt to Resuscitate)     Medical Interventions (Patient has pulse or is breathing):    Full Support        DVT prophylaxis:  Medical and mechanical DVT prophylaxis orders are present.         In brief:  RSBI: (S) 54 (03/24/24 1202)  SBT. Extubation done.  Monitor hypernatremia. Add IVF with D5W.  Continue Enteral Nutrition   Continue Ertapenem x 10 days total, as scheduled.  Goal: Glucose < 180 mg/dL.   Insulin SQ  F/U labs in AM.  Disposition: Keep in ICU.    Plan of care and goals reviewed during interdisciplinary rounds.  I discussed the patient's findings and my recommendations with family and nursing staff    MDM:    Problem(s) High due to: Acute or Chronic illness or injury that may poses a threat to life or bodily function  Data: Moderate due to: Review of prior external records from each unique source, Review or results of each unique test, and Ordering of each unique test    Moderate    [x] Primary Attending Intensive Care Medicine - Nutrition Support   [] Consultant    Copied text in this note has been reviewed and is accurate as of 03/24/24

## 2024-03-25 LAB
ALBUMIN SERPL-MCNC: 2.4 G/DL (ref 3.5–5.2)
ALBUMIN/GLOB SERPL: 0.8 G/DL
ALP SERPL-CCNC: 333 U/L (ref 39–117)
ALT SERPL W P-5'-P-CCNC: 380 U/L (ref 1–41)
ANION GAP SERPL CALCULATED.3IONS-SCNC: 6 MMOL/L (ref 5–15)
AST SERPL-CCNC: 276 U/L (ref 1–40)
BASOPHILS # BLD AUTO: 0.02 10*3/MM3 (ref 0–0.2)
BASOPHILS NFR BLD AUTO: 0.2 % (ref 0–1.5)
BILIRUB SERPL-MCNC: <0.2 MG/DL (ref 0–1.2)
BUN SERPL-MCNC: 40 MG/DL (ref 8–23)
BUN/CREAT SERPL: 32 (ref 7–25)
CALCIUM SPEC-SCNC: 7.6 MG/DL (ref 8.6–10.5)
CHLORIDE SERPL-SCNC: 112 MMOL/L (ref 98–107)
CO2 SERPL-SCNC: 27 MMOL/L (ref 22–29)
CREAT SERPL-MCNC: 1.25 MG/DL (ref 0.76–1.27)
CRP SERPL-MCNC: 4.73 MG/DL (ref 0–0.5)
DEPRECATED RDW RBC AUTO: 53.9 FL (ref 37–54)
EGFRCR SERPLBLD CKD-EPI 2021: 63.5 ML/MIN/1.73
EOSINOPHIL # BLD AUTO: 0.23 10*3/MM3 (ref 0–0.4)
EOSINOPHIL NFR BLD AUTO: 2.1 % (ref 0.3–6.2)
ERYTHROCYTE [DISTWIDTH] IN BLOOD BY AUTOMATED COUNT: 15.9 % (ref 12.3–15.4)
GLOBULIN UR ELPH-MCNC: 3.1 GM/DL
GLUCOSE BLDC GLUCOMTR-MCNC: 164 MG/DL (ref 70–130)
GLUCOSE BLDC GLUCOMTR-MCNC: 239 MG/DL (ref 70–130)
GLUCOSE BLDC GLUCOMTR-MCNC: 242 MG/DL (ref 70–130)
GLUCOSE BLDC GLUCOMTR-MCNC: 279 MG/DL (ref 70–130)
GLUCOSE BLDC GLUCOMTR-MCNC: 286 MG/DL (ref 70–130)
GLUCOSE SERPL-MCNC: 235 MG/DL (ref 65–99)
HCT VFR BLD AUTO: 25.5 % (ref 37.5–51)
HGB BLD-MCNC: 7.8 G/DL (ref 13–17.7)
IMM GRANULOCYTES # BLD AUTO: 0.08 10*3/MM3 (ref 0–0.05)
IMM GRANULOCYTES NFR BLD AUTO: 0.7 % (ref 0–0.5)
LYMPHOCYTES # BLD AUTO: 1.01 10*3/MM3 (ref 0.7–3.1)
LYMPHOCYTES NFR BLD AUTO: 9.2 % (ref 19.6–45.3)
MAGNESIUM SERPL-MCNC: 1.9 MG/DL (ref 1.6–2.4)
MCH RBC QN AUTO: 28.3 PG (ref 26.6–33)
MCHC RBC AUTO-ENTMCNC: 30.6 G/DL (ref 31.5–35.7)
MCV RBC AUTO: 92.4 FL (ref 79–97)
MONOCYTES # BLD AUTO: 0.58 10*3/MM3 (ref 0.1–0.9)
MONOCYTES NFR BLD AUTO: 5.3 % (ref 5–12)
NEUTROPHILS NFR BLD AUTO: 82.5 % (ref 42.7–76)
NEUTROPHILS NFR BLD AUTO: 9.09 10*3/MM3 (ref 1.7–7)
NRBC BLD AUTO-RTO: 0 /100 WBC (ref 0–0.2)
PHOSPHATE SERPL-MCNC: 3.4 MG/DL (ref 2.5–4.5)
PLATELET # BLD AUTO: 463 10*3/MM3 (ref 140–450)
PMV BLD AUTO: 11.6 FL (ref 6–12)
POTASSIUM SERPL-SCNC: 3.9 MMOL/L (ref 3.5–5.2)
PREALB SERPL-MCNC: 13.8 MG/DL (ref 20–40)
PROT SERPL-MCNC: 5.5 G/DL (ref 6–8.5)
RBC # BLD AUTO: 2.76 10*6/MM3 (ref 4.14–5.8)
SODIUM SERPL-SCNC: 141 MMOL/L (ref 136–145)
SODIUM SERPL-SCNC: 145 MMOL/L (ref 136–145)
TRIGL SERPL-MCNC: 106 MG/DL (ref 0–150)
WBC NRBC COR # BLD AUTO: 11.01 10*3/MM3 (ref 3.4–10.8)

## 2024-03-25 PROCEDURE — 94799 UNLISTED PULMONARY SVC/PX: CPT

## 2024-03-25 PROCEDURE — 63710000001 INSULIN REGULAR HUMAN PER 5 UNITS: Performed by: INTERNAL MEDICINE

## 2024-03-25 PROCEDURE — 94761 N-INVAS EAR/PLS OXIMETRY MLT: CPT

## 2024-03-25 PROCEDURE — 99232 SBSQ HOSP IP/OBS MODERATE 35: CPT | Performed by: INTERNAL MEDICINE

## 2024-03-25 PROCEDURE — 84100 ASSAY OF PHOSPHORUS: CPT | Performed by: INTERNAL MEDICINE

## 2024-03-25 PROCEDURE — 83735 ASSAY OF MAGNESIUM: CPT | Performed by: INTERNAL MEDICINE

## 2024-03-25 PROCEDURE — 97162 PT EVAL MOD COMPLEX 30 MIN: CPT

## 2024-03-25 PROCEDURE — 25010000002 HEPARIN (PORCINE) PER 1000 UNITS: Performed by: INTERNAL MEDICINE

## 2024-03-25 PROCEDURE — 94664 DEMO&/EVAL PT USE INHALER: CPT

## 2024-03-25 PROCEDURE — 97530 THERAPEUTIC ACTIVITIES: CPT

## 2024-03-25 PROCEDURE — 25010000002 ERTAPENEM PER 500 MG: Performed by: INTERNAL MEDICINE

## 2024-03-25 PROCEDURE — 63710000001 INSULIN DETEMIR PER 5 UNITS: Performed by: INTERNAL MEDICINE

## 2024-03-25 PROCEDURE — 80053 COMPREHEN METABOLIC PANEL: CPT | Performed by: INTERNAL MEDICINE

## 2024-03-25 PROCEDURE — 86140 C-REACTIVE PROTEIN: CPT | Performed by: INTERNAL MEDICINE

## 2024-03-25 PROCEDURE — 84134 ASSAY OF PREALBUMIN: CPT | Performed by: INTERNAL MEDICINE

## 2024-03-25 PROCEDURE — 84478 ASSAY OF TRIGLYCERIDES: CPT | Performed by: INTERNAL MEDICINE

## 2024-03-25 PROCEDURE — 84295 ASSAY OF SERUM SODIUM: CPT | Performed by: INTERNAL MEDICINE

## 2024-03-25 PROCEDURE — 92610 EVALUATE SWALLOWING FUNCTION: CPT

## 2024-03-25 PROCEDURE — 94690 O2 UPTK REST INDIRECT: CPT | Performed by: INTERNAL MEDICINE

## 2024-03-25 PROCEDURE — 97166 OT EVAL MOD COMPLEX 45 MIN: CPT

## 2024-03-25 PROCEDURE — 0 DEXTROSE 5 % SOLUTION: Performed by: INTERNAL MEDICINE

## 2024-03-25 PROCEDURE — 82948 REAGENT STRIP/BLOOD GLUCOSE: CPT

## 2024-03-25 PROCEDURE — 85025 COMPLETE CBC W/AUTO DIFF WBC: CPT | Performed by: INTERNAL MEDICINE

## 2024-03-25 RX ORDER — IPRATROPIUM BROMIDE AND ALBUTEROL SULFATE 2.5; .5 MG/3ML; MG/3ML
3 SOLUTION RESPIRATORY (INHALATION) EVERY 4 HOURS PRN
Status: DISCONTINUED | OUTPATIENT
Start: 2024-03-25 | End: 2024-04-04 | Stop reason: HOSPADM

## 2024-03-25 RX ORDER — AMINO ACIDS/PROTEIN HYDROLYS 11G-40/45
30 LIQUID IN PACKET (ML) ORAL 2 TIMES DAILY
Status: DISCONTINUED | OUTPATIENT
Start: 2024-03-25 | End: 2024-04-04 | Stop reason: HOSPADM

## 2024-03-25 RX ORDER — GUAIFENESIN 200 MG/10ML
400 LIQUID ORAL EVERY 4 HOURS
Status: DISCONTINUED | OUTPATIENT
Start: 2024-03-25 | End: 2024-03-27

## 2024-03-25 RX ORDER — ACETYLCYSTEINE 200 MG/ML
4 SOLUTION ORAL; RESPIRATORY (INHALATION) EVERY 4 HOURS
Status: DISCONTINUED | OUTPATIENT
Start: 2024-03-25 | End: 2024-03-26

## 2024-03-25 RX ADMIN — CLONIDINE HYDROCHLORIDE 0.2 MG: 0.2 TABLET ORAL at 21:32

## 2024-03-25 RX ADMIN — AMLODIPINE BESYLATE 10 MG: 10 TABLET ORAL at 09:30

## 2024-03-25 RX ADMIN — ALBUTEROL SULFATE 2.5 MG: 2.5 SOLUTION RESPIRATORY (INHALATION) at 02:13

## 2024-03-25 RX ADMIN — HUMAN INSULIN 4 UNITS: 100 INJECTION, SOLUTION SUBCUTANEOUS at 05:32

## 2024-03-25 RX ADMIN — TIMOLOL MALEATE 1 DROP: 5 SOLUTION/ DROPS OPHTHALMIC at 09:30

## 2024-03-25 RX ADMIN — CARVEDILOL 12.5 MG: 12.5 TABLET, FILM COATED ORAL at 09:30

## 2024-03-25 RX ADMIN — BRIMONIDINE TARTRATE 1 DROP: 2 SOLUTION/ DROPS OPHTHALMIC at 21:31

## 2024-03-25 RX ADMIN — HUMAN INSULIN 4 UNITS: 100 INJECTION, SOLUTION SUBCUTANEOUS at 05:33

## 2024-03-25 RX ADMIN — HUMAN INSULIN 6 UNITS: 100 INJECTION, SOLUTION SUBCUTANEOUS at 13:38

## 2024-03-25 RX ADMIN — CLONIDINE HYDROCHLORIDE 0.2 MG: 0.2 TABLET ORAL at 05:18

## 2024-03-25 RX ADMIN — ALBUTEROL SULFATE 2.5 MG: 2.5 SOLUTION RESPIRATORY (INHALATION) at 12:59

## 2024-03-25 RX ADMIN — BRIMONIDINE TARTRATE 1 DROP: 2 SOLUTION/ DROPS OPHTHALMIC at 15:24

## 2024-03-25 RX ADMIN — BRIMONIDINE TARTRATE 1 DROP: 2 SOLUTION/ DROPS OPHTHALMIC at 09:30

## 2024-03-25 RX ADMIN — ERTAPENEM 1000 MG: 1 INJECTION INTRAMUSCULAR; INTRAVENOUS at 19:22

## 2024-03-25 RX ADMIN — CARVEDILOL 12.5 MG: 12.5 TABLET, FILM COATED ORAL at 21:32

## 2024-03-25 RX ADMIN — INSULIN DETEMIR 6 UNITS: 100 INJECTION, SOLUTION SUBCUTANEOUS at 09:31

## 2024-03-25 RX ADMIN — HYDRALAZINE HYDROCHLORIDE 100 MG: 50 TABLET ORAL at 05:18

## 2024-03-25 RX ADMIN — FLUOXETINE 20 MG: 20 CAPSULE ORAL at 10:39

## 2024-03-25 RX ADMIN — TERAZOSIN HYDROCHLORIDE ANHYDROUS 5 MG: 5 CAPSULE ORAL at 21:32

## 2024-03-25 RX ADMIN — TORSEMIDE 5 MG: 10 TABLET ORAL at 10:39

## 2024-03-25 RX ADMIN — HUMAN INSULIN 4 UNITS: 100 INJECTION, SOLUTION SUBCUTANEOUS at 00:21

## 2024-03-25 RX ADMIN — INSULIN DETEMIR 8 UNITS: 100 INJECTION, SOLUTION SUBCUTANEOUS at 21:32

## 2024-03-25 RX ADMIN — HYDRALAZINE HYDROCHLORIDE 100 MG: 50 TABLET ORAL at 21:32

## 2024-03-25 RX ADMIN — ACETYLCYSTEINE 4 ML: 200 SOLUTION ORAL; RESPIRATORY (INHALATION) at 23:12

## 2024-03-25 RX ADMIN — HYDRALAZINE HYDROCHLORIDE 100 MG: 50 TABLET ORAL at 13:38

## 2024-03-25 RX ADMIN — TIMOLOL MALEATE 1 DROP: 5 SOLUTION/ DROPS OPHTHALMIC at 21:31

## 2024-03-25 RX ADMIN — HEPARIN SODIUM 5000 UNITS: 5000 INJECTION INTRAVENOUS; SUBCUTANEOUS at 21:32

## 2024-03-25 RX ADMIN — DEXTROSE MONOHYDRATE 100 ML/HR: 50 INJECTION, SOLUTION INTRAVENOUS at 07:32

## 2024-03-25 RX ADMIN — HEPARIN SODIUM 5000 UNITS: 5000 INJECTION INTRAVENOUS; SUBCUTANEOUS at 13:39

## 2024-03-25 RX ADMIN — HEPARIN SODIUM 5000 UNITS: 5000 INJECTION INTRAVENOUS; SUBCUTANEOUS at 05:18

## 2024-03-25 RX ADMIN — PANTOPRAZOLE SODIUM 40 MG: 40 INJECTION, POWDER, FOR SOLUTION INTRAVENOUS at 09:31

## 2024-03-25 RX ADMIN — HUMAN INSULIN 6 UNITS: 100 INJECTION, SOLUTION SUBCUTANEOUS at 19:23

## 2024-03-25 RX ADMIN — ALBUTEROL SULFATE 2.5 MG: 2.5 SOLUTION RESPIRATORY (INHALATION) at 07:15

## 2024-03-25 RX ADMIN — ARIPIPRAZOLE 5 MG: 10 TABLET ORAL at 09:30

## 2024-03-25 RX ADMIN — ALBUTEROL SULFATE 2.5 MG: 2.5 SOLUTION RESPIRATORY (INHALATION) at 19:15

## 2024-03-25 RX ADMIN — CLONIDINE HYDROCHLORIDE 0.2 MG: 0.2 TABLET ORAL at 13:39

## 2024-03-25 RX ADMIN — TERAZOSIN HYDROCHLORIDE ANHYDROUS 5 MG: 5 CAPSULE ORAL at 09:30

## 2024-03-25 NOTE — PLAN OF CARE
Goal Outcome Evaluation:  Plan of Care Reviewed With: patient                  Anticipated Discharge Disposition (SLP): inpatient rehabilitation facility          SLP Swallowing Diagnosis: suspected pharyngeal dysphagia (suspect severe based on clinical presentation) (03/25/24 2548)

## 2024-03-25 NOTE — PLAN OF CARE
Problem: Skin Injury Risk Increased  Goal: Skin Health and Integrity  Outcome: Ongoing, Progressing  Intervention: Optimize Skin Protection  Recent Flowsheet Documentation  Taken 3/25/2024 0200 by Stella Kelly RN  Head of Bed (HOB) Positioning: HOB at 30-45 degrees  Taken 3/25/2024 0000 by Stella Kelly RN  Pressure Reduction Techniques:   frequent weight shift encouraged   weight shift assistance provided   heels elevated off bed   positioned off wounds   pressure points protected  Head of Bed (HOB) Positioning: HOB at 30-45 degrees  Pressure Reduction Devices:   specialty bed utilized   pressure-redistributing mattress utilized   positioning supports utilized  Skin Protection:   adhesive use limited   tubing/devices free from skin contact   transparent dressing maintained   skin-to-skin areas padded   skin-to-device areas padded   incontinence pads utilized  Taken 3/24/2024 2200 by Stella Kelly RN  Pressure Reduction Techniques:   frequent weight shift encouraged   weight shift assistance provided   pressure points protected   positioned off wounds   heels elevated off bed  Head of Bed (HOB) Positioning: HOB at 30-45 degrees  Pressure Reduction Devices:   specialty bed utilized   pressure-redistributing mattress utilized   positioning supports utilized   heel offloading device utilized  Skin Protection:   adhesive use limited   tubing/devices free from skin contact   transparent dressing maintained   skin-to-skin areas padded   skin-to-device areas padded  Taken 3/24/2024 2000 by Stella Kelly RN  Pressure Reduction Techniques:   frequent weight shift encouraged   heels elevated off bed   positioned off wounds   pressure points protected   weight shift assistance provided  Head of Bed (HOB) Positioning: HOB at 30-45 degrees  Pressure Reduction Devices:   specialty bed utilized   pressure-redistributing mattress utilized   positioning supports utilized   heel offloading device utilized  Skin  Protection:   adhesive use limited   incontinence pads utilized   tubing/devices free from skin contact   transparent dressing maintained   skin-to-skin areas padded   skin-to-device areas padded   pulse oximeter probe site changed   skin sealant/moisture barrier applied     Problem: Fall Injury Risk  Goal: Absence of Fall and Fall-Related Injury  Outcome: Ongoing, Progressing  Intervention: Identify and Manage Contributors  Recent Flowsheet Documentation  Taken 3/25/2024 0000 by Stella Kelly, RN  Medication Review/Management: medications reviewed  Taken 3/24/2024 2200 by Stella Kelly, RN  Medication Review/Management: medications reviewed  Taken 3/24/2024 2000 by Stella Kelly RN  Medication Review/Management: medications reviewed  Self-Care Promotion: independence encouraged  Intervention: Promote Injury-Free Environment  Recent Flowsheet Documentation  Taken 3/25/2024 0200 by Stella Kelly, RN  Safety Promotion/Fall Prevention:   activity supervised   assistive device/personal items within reach   clutter free environment maintained   fall prevention program maintained   lighting adjusted   muscle strengthening facilitated   nonskid shoes/slippers when out of bed   room organization consistent   safety round/check completed  Taken 3/25/2024 0000 by Stella Kelly, RN  Safety Promotion/Fall Prevention:   activity supervised   assistive device/personal items within reach   clutter free environment maintained   fall prevention program maintained   lighting adjusted   muscle strengthening facilitated   nonskid shoes/slippers when out of bed   room organization consistent   safety round/check completed  Taken 3/24/2024 2200 by Stella Kelly, RN  Safety Promotion/Fall Prevention:   activity supervised   assistive device/personal items within reach   clutter free environment maintained   fall prevention program maintained   lighting adjusted   muscle strengthening facilitated   nonskid shoes/slippers when  out of bed   safety round/check completed   room organization consistent  Taken 3/24/2024 2000 by Stella Kelly, RN  Safety Promotion/Fall Prevention:   activity supervised   assistive device/personal items within reach   clutter free environment maintained   fall prevention program maintained   lighting adjusted   muscle strengthening facilitated   nonskid shoes/slippers when out of bed   room organization consistent   safety round/check completed     Problem: Adult Inpatient Plan of Care  Goal: Plan of Care Review  Outcome: Ongoing, Progressing  Flowsheets (Taken 3/25/2024 0441)  Progress: improving  Plan of Care Reviewed With:   patient   daughter  Goal: Patient-Specific Goal (Individualized)  Outcome: Ongoing, Progressing  Goal: Absence of Hospital-Acquired Illness or Injury  Outcome: Ongoing, Progressing  Intervention: Identify and Manage Fall Risk  Recent Flowsheet Documentation  Taken 3/25/2024 0200 by Stella Kelly, RN  Safety Promotion/Fall Prevention:   activity supervised   assistive device/personal items within reach   clutter free environment maintained   fall prevention program maintained   lighting adjusted   muscle strengthening facilitated   nonskid shoes/slippers when out of bed   room organization consistent   safety round/check completed  Taken 3/25/2024 0000 by Stella Kelly, RN  Safety Promotion/Fall Prevention:   activity supervised   assistive device/personal items within reach   clutter free environment maintained   fall prevention program maintained   lighting adjusted   muscle strengthening facilitated   nonskid shoes/slippers when out of bed   room organization consistent   safety round/check completed  Taken 3/24/2024 2200 by Stella Kelly, RN  Safety Promotion/Fall Prevention:   activity supervised   assistive device/personal items within reach   clutter free environment maintained   fall prevention program maintained   lighting adjusted   muscle strengthening facilitated    nonskid shoes/slippers when out of bed   safety round/check completed   room organization consistent  Taken 3/24/2024 2000 by Stella Kelly, RN  Safety Promotion/Fall Prevention:   activity supervised   assistive device/personal items within reach   clutter free environment maintained   fall prevention program maintained   lighting adjusted   muscle strengthening facilitated   nonskid shoes/slippers when out of bed   room organization consistent   safety round/check completed  Intervention: Prevent Skin Injury  Recent Flowsheet Documentation  Taken 3/25/2024 0200 by Stella Kelly, RN  Body Position:   weight shifting   upper extremity elevated   neutral head position   neutral body alignment   legs elevated   turned  Taken 3/25/2024 0000 by Stella Kelly, RN  Body Position:   weight shifting   upper extremity elevated   neutral head position   neutral body alignment   legs elevated   turned  Skin Protection:   adhesive use limited   tubing/devices free from skin contact   transparent dressing maintained   skin-to-skin areas padded   skin-to-device areas padded   incontinence pads utilized  Taken 3/24/2024 2200 by Stella Kelly, RN  Body Position:   weight shifting   upper extremity elevated   neutral head position   neutral body alignment   legs elevated   turned  Skin Protection:   adhesive use limited   tubing/devices free from skin contact   transparent dressing maintained   skin-to-skin areas padded   skin-to-device areas padded  Taken 3/24/2024 2000 by Stella Kelly, RN  Body Position:   weight shifting   upper extremity elevated   neutral head position   neutral body alignment   legs elevated   turned  Skin Protection:   adhesive use limited   incontinence pads utilized   tubing/devices free from skin contact   transparent dressing maintained   skin-to-skin areas padded   skin-to-device areas padded   pulse oximeter probe site changed   skin sealant/moisture barrier applied  Intervention: Prevent and  Manage VTE (Venous Thromboembolism) Risk  Recent Flowsheet Documentation  Taken 3/25/2024 0200 by Stella Kelly RN  Activity Management: bedrest  VTE Prevention/Management: sequential compression devices off  Range of Motion: active ROM (range of motion) encouraged  Taken 3/25/2024 0000 by Stella Kelly RN  Activity Management: bedrest  VTE Prevention/Management: sequential compression devices off  Range of Motion: active ROM (range of motion) encouraged  Taken 3/24/2024 2200 by Stella Kelly RN  Activity Management: bedrest  VTE Prevention/Management: sequential compression devices off  Range of Motion: active ROM (range of motion) encouraged  Taken 3/24/2024 2000 by Stella Kelly RN  Activity Management: bedrest  VTE Prevention/Management:   bilateral   sequential compression devices on  Range of Motion:   active ROM (range of motion) encouraged   ROM (range of motion) performed  Intervention: Prevent Infection  Recent Flowsheet Documentation  Taken 3/25/2024 0200 by Stella Kelly RN  Infection Prevention:   environmental surveillance performed   equipment surfaces disinfected   hand hygiene promoted   personal protective equipment utilized   rest/sleep promoted   single patient room provided   visitors restricted/screened  Taken 3/25/2024 0000 by Stella Kelly RN  Infection Prevention:   environmental surveillance performed   equipment surfaces disinfected   hand hygiene promoted   personal protective equipment utilized   rest/sleep promoted   single patient room provided   visitors restricted/screened  Taken 3/24/2024 2200 by Stella Kelly RN  Infection Prevention:   equipment surfaces disinfected   environmental surveillance performed   hand hygiene promoted   personal protective equipment utilized   rest/sleep promoted   single patient room provided   visitors restricted/screened  Taken 3/24/2024 2000 by Stella Kelly RN  Infection Prevention:   environmental surveillance performed    equipment surfaces disinfected   hand hygiene promoted   personal protective equipment utilized   rest/sleep promoted   single patient room provided   visitors restricted/screened  Goal: Optimal Comfort and Wellbeing  Outcome: Ongoing, Progressing  Intervention: Monitor Pain and Promote Comfort  Recent Flowsheet Documentation  Taken 3/24/2024 2000 by Stella Kelly, RN  Pain Management Interventions:   care clustered   diversional activity provided   pain management plan reviewed with patient/caregiver   position adjusted   pillow support provided   quiet environment facilitated   unnecessary movement minimized  Intervention: Provide Person-Centered Care  Recent Flowsheet Documentation  Taken 3/24/2024 2000 by Stella Kelly, RN  Trust Relationship/Rapport:   care explained   choices provided   emotional support provided   empathic listening provided   questions answered   questions encouraged   reassurance provided   thoughts/feelings acknowledged  Goal: Readiness for Transition of Care  Outcome: Ongoing, Progressing     Problem: Communication Impairment (Mechanical Ventilation, Invasive)  Goal: Effective Communication  Outcome: Ongoing, Progressing  Intervention: Ensure Effective Communication  Recent Flowsheet Documentation  Taken 3/24/2024 2000 by Stella Kelly, RN  Communication Enhancement Strategies:   call light answered in person   extra time allowed for response   verbal and visual cues paired   repetition utilized   nonverbal strategies used   one-step directions provided     Problem: Device-Related Complication Risk (Mechanical Ventilation, Invasive)  Goal: Optimal Device Function  Outcome: Ongoing, Progressing  Intervention: Optimize Device Care and Function  Recent Flowsheet Documentation  Taken 3/25/2024 0200 by Stella Kelly, RN  Aspiration Precautions:   upright posture maintained   tube feeding placement verified   respiratory status monitored   oral hygiene care promoted   NPO pending  swallow screening/evaluation   medication route adjusted  Airway Safety Measures:   manual resuscitator/mask at bedside   oxygen flowmeter at bedside   suction at bedside  Taken 3/25/2024 0000 by Stella Kelly RN  Airway Safety Measures:   manual resuscitator/mask at bedside   oxygen flowmeter at bedside   suction at bedside  Taken 3/24/2024 2200 by Stella Kelly RN  Airway Safety Measures:   manual resuscitator/mask at bedside   oxygen flowmeter at bedside   suction at bedside  Taken 3/24/2024 2000 by Stella Kelly RN  Aspiration Precautions:   medication route adjusted   NPO pending swallow screening/evaluation   oral hygiene care promoted   respiratory status monitored   tube feeding placement verified   upright posture maintained  Airway Safety Measures:   manual resuscitator/mask at bedside   suction at bedside   oxygen flowmeter at bedside     Problem: Inability to Wean (Mechanical Ventilation, Invasive)  Goal: Mechanical Ventilation Liberation  Outcome: Ongoing, Progressing  Intervention: Promote Extubation and Mechanical Ventilation Liberation  Recent Flowsheet Documentation  Taken 3/25/2024 0000 by Stella Kelly, RN  Medication Review/Management: medications reviewed  Taken 3/24/2024 2200 by Stella Kelly RN  Medication Review/Management: medications reviewed  Taken 3/24/2024 2000 by Stella Kelly, RN  Environmental Support:   calm environment promoted   distractions minimized   environmental consistency promoted  Sleep/Rest Enhancement:   awakenings minimized   consistent schedule promoted   family presence promoted   natural light exposure provided   noise level reduced   regular sleep/rest pattern promoted   room darkened   therapeutic touch utilized  Medication Review/Management: medications reviewed     Problem: Nutrition Impairment (Mechanical Ventilation, Invasive)  Goal: Optimal Nutrition Delivery  Outcome: Ongoing, Progressing     Problem: Skin and Tissue Injury (Mechanical  Ventilation, Invasive)  Goal: Absence of Device-Related Skin and Tissue Injury  Outcome: Ongoing, Progressing  Intervention: Maintain Skin and Tissue Health  Recent Flowsheet Documentation  Taken 3/25/2024 0000 by Stella Kelly RN  Device Skin Pressure Protection:   absorbent pad utilized/changed   skin-to-skin areas padded   skin-to-device areas padded   pressure points protected   positioning supports utilized  Taken 3/24/2024 2200 by Stella Kelly RN  Device Skin Pressure Protection:   absorbent pad utilized/changed   adhesive use limited   positioning supports utilized   pressure points protected   skin-to-device areas padded   skin-to-skin areas padded  Taken 3/24/2024 2000 by Stella Kelly RN  Device Skin Pressure Protection:   absorbent pad utilized/changed   adhesive use limited   positioning supports utilized   pressure points protected   skin-to-device areas padded   skin-to-skin areas padded     Problem: Ventilator-Induced Lung Injury (Mechanical Ventilation, Invasive)  Goal: Absence of Ventilator-Induced Lung Injury  Outcome: Ongoing, Progressing  Intervention: Prevent Ventilator-Associated Pneumonia  Recent Flowsheet Documentation  Taken 3/25/2024 0200 by Stella Kelly RN  Head of Bed ([facility]) Positioning: HOB at 30-45 degrees  Oral Care: suction provided  Taken 3/25/2024 0000 by Stella Kelly RN  Head of Bed ([facility]) Positioning: HOB at 30-45 degrees  Oral Care: suction provided  Taken 3/24/2024 2200 by Stella Kelly RN  Head of Bed ([facility]) Positioning: HOB at 30-45 degrees  Oral Care: suction provided  Taken 3/24/2024 2000 by Stella Kelly RN  Head of Bed ([facility]) Positioning: HOB at 30-45 degrees  Oral Care:   lip/mouth moisturizer applied   suction provided   swabbed with antiseptic solution   teeth brushed - suction toothbrush   tongue brushed     Problem: Adjustment to Illness (Sepsis/Septic Shock)  Goal: Optimal Coping  Outcome: Ongoing, Progressing  Intervention: Optimize Psychosocial  Adjustment to Illness  Recent Flowsheet Documentation  Taken 3/25/2024 0000 by Stella Kelly RN  Family/Support System Care: support provided  Taken 3/24/2024 2000 by Stella Kelly RN  Supportive Measures:   active listening utilized   positive reinforcement provided   verbalization of feelings encouraged  Family/Support System Care:   caregiver stress acknowledged   involvement promoted   presence promoted   support provided     Problem: Bleeding (Sepsis/Septic Shock)  Goal: Absence of Bleeding  Outcome: Ongoing, Progressing  Intervention: Monitor and Manage Bleeding  Recent Flowsheet Documentation  Taken 3/25/2024 0200 by Stella Kelly RN  Bleeding Precautions:   blood pressure closely monitored   coagulation study results reviewed   foot protection facilitated   gentle oral care promoted   monitored for signs of bleeding  Taken 3/24/2024 2000 by Stella Kelly RN  Bleeding Precautions:   blood pressure closely monitored   coagulation study results reviewed   foot protection facilitated   gentle oral care promoted   monitored for signs of bleeding     Problem: Glycemic Control Impaired (Sepsis/Septic Shock)  Goal: Blood Glucose Level Within Desired Range  Outcome: Ongoing, Progressing  Intervention: Optimize Glycemic Control  Recent Flowsheet Documentation  Taken 3/24/2024 2000 by Stella Kelly RN  Glycemic Management:   blood glucose monitored   supplemental insulin given     Problem: Infection Progression (Sepsis/Septic Shock)  Goal: Absence of Infection Signs and Symptoms  Outcome: Ongoing, Progressing  Intervention: Initiate Sepsis Management  Recent Flowsheet Documentation  Taken 3/25/2024 0200 by Stella Kelly RN  Infection Management: aseptic technique maintained  Infection Prevention:   environmental surveillance performed   equipment surfaces disinfected   hand hygiene promoted   personal protective equipment utilized   rest/sleep promoted   single patient room provided   visitors  restricted/screened  Isolation Precautions: precautions maintained  Taken 3/25/2024 0000 by Stella Kelly RN  Infection Prevention:   environmental surveillance performed   equipment surfaces disinfected   hand hygiene promoted   personal protective equipment utilized   rest/sleep promoted   single patient room provided   visitors restricted/screened  Isolation Precautions: precautions maintained  Taken 3/24/2024 2200 by Stella Kelly RN  Infection Prevention:   equipment surfaces disinfected   environmental surveillance performed   hand hygiene promoted   personal protective equipment utilized   rest/sleep promoted   single patient room provided   visitors restricted/screened  Isolation Precautions: precautions maintained  Taken 3/24/2024 2000 by Stella Kelly RN  Infection Management: aseptic technique maintained  Infection Prevention:   environmental surveillance performed   equipment surfaces disinfected   hand hygiene promoted   personal protective equipment utilized   rest/sleep promoted   single patient room provided   visitors restricted/screened  Isolation Precautions: precautions maintained  Intervention: Promote Recovery  Recent Flowsheet Documentation  Taken 3/25/2024 0200 by Stella Kelly RN  Activity Management: bedrest  Taken 3/25/2024 0000 by Stella Kelly RN  Activity Management: bedrest  Taken 3/24/2024 2200 by Stella Kelly RN  Activity Management: bedrest  Taken 3/24/2024 2000 by Stella Kelly RN  Activity Management: bedrest  Airway/Ventilation Support:   comfort measures provided   cough relief provided   dyspnea relief promoted   humidification applied   pulmonary hygiene promoted  Sleep/Rest Enhancement:   awakenings minimized   consistent schedule promoted   family presence promoted   natural light exposure provided   noise level reduced   regular sleep/rest pattern promoted   room darkened   therapeutic touch utilized     Problem: Nutrition Impaired (Sepsis/Septic  Shock)  Goal: Optimal Nutrition Intake  Outcome: Ongoing, Progressing     Problem: Restraint, Nonviolent  Goal: Absence of Harm or Injury  Outcome: Ongoing, Progressing  Intervention: Implement Least Restrictive Safety Strategies  Recent Flowsheet Documentation  Taken 3/25/2024 0200 by Stella Kelly RN  Medical Device Protection:   IV pole/bag removed from visual field   tubing secured  Taken 3/25/2024 0000 by Stella Kelly RN  Medical Device Protection:   IV pole/bag removed from visual field   tubing secured  Diversional Activities: television  Taken 3/24/2024 2200 by Stella Kelly RN  Medical Device Protection:   IV pole/bag removed from visual field   tubing secured  Taken 3/24/2024 2000 by Stella Kelly RN  Medical Device Protection:   IV pole/bag removed from visual field   tubing secured  Diversional Activities: television  Intervention: Protect Dignity, Rights, and Personal Wellbeing  Recent Flowsheet Documentation  Taken 3/24/2024 2000 by Stella Kelly RN  Trust Relationship/Rapport:   care explained   choices provided   emotional support provided   empathic listening provided   questions answered   questions encouraged   reassurance provided   thoughts/feelings acknowledged  Intervention: Protect Skin and Joint Integrity  Recent Flowsheet Documentation  Taken 3/25/2024 0200 by Stella Kelly RN  Body Position:   weight shifting   upper extremity elevated   neutral head position   neutral body alignment   legs elevated   turned  Range of Motion: active ROM (range of motion) encouraged  Taken 3/25/2024 0000 by Stella Kelly RN  Body Position:   weight shifting   upper extremity elevated   neutral head position   neutral body alignment   legs elevated   turned  Range of Motion: active ROM (range of motion) encouraged  Taken 3/24/2024 2200 by Stella Kelly RN  Body Position:   weight shifting   upper extremity elevated   neutral head position   neutral body alignment   legs elevated    turned  Range of Motion: active ROM (range of motion) encouraged  Taken 3/24/2024 2000 by Stella Kelly, RN  Body Position:   weight shifting   upper extremity elevated   neutral head position   neutral body alignment   legs elevated   turned  Range of Motion:   active ROM (range of motion) encouraged   ROM (range of motion) performed     Problem: Diabetes Comorbidity  Goal: Blood Glucose Level Within Targeted Range  Outcome: Ongoing, Progressing  Intervention: Monitor and Manage Glycemia  Recent Flowsheet Documentation  Taken 3/24/2024 2000 by Stella Kelly RN  Glycemic Management:   blood glucose monitored   supplemental insulin given     Problem: Heart Failure Comorbidity  Goal: Maintenance of Heart Failure Symptom Control  Outcome: Ongoing, Progressing  Intervention: Maintain Heart Failure-Management  Recent Flowsheet Documentation  Taken 3/25/2024 0000 by Stella Kelly, RN  Medication Review/Management: medications reviewed  Taken 3/24/2024 2200 by Stella Kelly RN  Medication Review/Management: medications reviewed  Taken 3/24/2024 2000 by Stella Kelly RN  Medication Review/Management: medications reviewed     Problem: Hypertension Comorbidity  Goal: Blood Pressure in Desired Range  Outcome: Ongoing, Progressing  Intervention: Maintain Blood Pressure Management  Recent Flowsheet Documentation  Taken 3/25/2024 0000 by Stella Kelly RN  Medication Review/Management: medications reviewed  Taken 3/24/2024 2200 by Stella Kelly, RN  Medication Review/Management: medications reviewed  Taken 3/24/2024 2000 by Stella Kelly RN  Medication Review/Management: medications reviewed   Goal Outcome Evaluation:  Plan of Care Reviewed With: patient, daughter        Progress: improving

## 2024-03-25 NOTE — THERAPY EVALUATION
Acute Care - Speech Language Pathology   Swallow Initial Evaluation [facility]  Clinical Swallow Evaluation     Patient Name: Omkar Nava  : 1955  MRN: [id number]  Today's Date: 3/25/2024               Admit Date: 3/10/2024    Visit Dx:     ICD-10-CM ICD-9-CM   1. Acute respiratory failure with hypoxia  J96.01 518.81   2. History of dementia  Z86.59 V11.8   3. Pneumonia due to infectious organism, unspecified laterality, unspecified part of lung  J18.9 486   4. Hypothermia, initial encounter  T68.XXXA 991.6   5. Anemia, unspecified type  D64.9 285.9   6. Hyperkalemia  E87.5 276.7   7. Encephalopathy  G93.40 348.30   8. Protein-calorie malnutrition, unspecified severity  E46 263.9   9. Iron deficiency anemia, unspecified iron deficiency anemia type  D50.9 280.9   10. 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, without long-term current use of insulin    Orthostatic hypotension    Acute osteomyelitis of left foot    Type 2 diabetes mellitus    Essential hypertension    Psychotic disorder    Neurocognitive disorder    S/P transmetatarsal amputation of foot, left    AMS (altered mental status)    Hypoxia    Abscess of left foot    Anemia of chronic disease    Aspiration pneumonia    Cellulitis of left toe    Cellulitis of lower extremity    Cervical spine pain    Chronic kidney disease    Closed head injury without loss of consciousness    Dementia    Dental cavities    Diarrhea of presumed infectious origin    Displaced fracture of proximal phalanx of left great toe, initial encounter for open fracture    Dysphagia    Erectile dysfunction    Fall    Gas gangrene    Generalized muscle weakness    Hallucinations    Hypertensive heart and chronic kidney disease 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 failure, with stage 1 through stage 4  chronic kidney disease, or unspecified chronic kidney disease    Insomnia due to medical condition    Iron deficiency anemia    Klebsiella pneumoniae (k. pneumoniae) as the cause of diseases classified elsewhere    Laceration without foreign body, left foot, subsequent encounter    Long term (current) use of insulin    Other acute osteomyelitis, left ankle and foot    Personal history of Methicillin resistant Staphylococcus aureus infection    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    Protein calorie malnutrition    Simple chronic bronchitis    Tobacco use    Type 2 diabetes mellitus with diabetic neuropathy, unspecified    Wound infection, posttraumatic    Type 2 diabetes mellitus with diabetic chronic kidney disease    Encephalopathy    Bilateral pneumonia    Acute kidney injury superimposed on chronic kidney disease    Acute type 1 respiratory failure     Past Medical History:   Diagnosis Date    Anemia     Dementia     Diabetes mellitus     Dysphagia     GERD (gastroesophageal reflux disease)     History of alcohol abuse     History of cocaine use     History of marijuana use     Hypertension     Osteomyelitis     Poor historian     records obtained from nursing home records & his family    Visual impairment      Past Surgical History:   Procedure Laterality Date    AMPUTATION FOOT Left 10/18/2022    Procedure: PARTIAL FIRST RAY AMPUTATION LEFT;  Surgeon: Yeison Petty MD;  Location:  [facility];  Service: Orthopedics;  Laterality: Left;    AMPUTATION FOOT Left 12/5/2022    Procedure: Transmetatarsal of Left Foot;  Surgeon: Yeison Petty MD;  Location:  [facility];  Service: Orthopedics;  Laterality: Left;    ENDOSCOPY N/A 3/14/2024    Procedure: ESOPHAGOGASTRODUODENOSCOPY WITH JEJUNAL TUBE INSERTION AT BEDSIDE;  Surgeon: Brunner, Mark I, MD;  Location:  [facility];  Service: Gastroenterology;  Laterality: N/A;    EYE SURGERY         SLP Recommendation and Plan  SLP Swallowing Diagnosis: suspected pharyngeal dysphagia  (suspect severe based on clinical presentation) (03/25/24 1030)  SLP Diet Recommendation: NPO, temporary alternate methods of nutrition/hydration (03/25/24 1030)     SLP Rec. for Method of Medication Administration: meds via alternate route (03/25/24 1030)        Recommended Diagnostics:  (will continue to assess in tx--suspect may be some time before pt clinically appropriate to participate in FEES) (03/25/24 1030)  Swallow Criteria for Skilled Therapeutic Interventions Met: demonstrates skilled criteria (03/25/24 1030)  Anticipated Discharge Disposition (SLP): inpatient rehabilitation facility (03/25/24 1030)  Rehab Potential/Prognosis, Swallowing: re-evaluate goals as necessary (03/25/24 1030)  Therapy Frequency (Swallow): 5 days per week (03/25/24 1030)  Predicted Duration Therapy Intervention (Days): until discharge (03/25/24 1030)  Oral Care Recommendations: Oral Care BID/PRN, Suction toothbrush (03/25/24 1030)                                        Plan of Care Reviewed With: patient      SWALLOW EVALUATION (Last 72 Hours)       SLP Adult Swallow Evaluation       Row Name 03/25/24 1030                   Rehab Evaluation    Document Type evaluation  -AC        Subjective Information no complaints  -AC        Patient Observations alert;cooperative  -AC        Patient/Family/Caregiver Comments/Observations No family present.  -AC        Patient Effort adequate  -AC        Oral Care teeth brushed - suction toothbrush;swabbed with antiseptic solution;suction provided;lip/mouth moisturizer applied  -AC           General Information    Patient Profile Reviewed yes  -AC        Pertinent History Of Current Problem Re-adm w/ respiratory failure, pna. Intubated 3/10-3/24. Hx dementia, JAYY, psych d/o, dysphagia. Evaluated by SLP in Feb when adm w/ encephalopathy & pna. MBS completed @ that time revealed impaired sensation, impulsivity. SLP initially recomended pureed diet and thin liquid via small cup sips, but later  recommended downgrade to nectar-thick liquids.  -AC        Current Method of Nutrition NPO;nasogastric feedings;small-bore  -AC        Precautions/Limitations, Vision vision impairment, bilaterally  blind per RN  -AC        Precautions/Limitations, Hearing WFL;for purposes of eval  -AC        Prior Level of Function-Communication cognitive-linguistic impairment  -        Prior Level of Function-Swallowing --  hx dysphagia per last admission--diet prior to this admit unclear  -AC        Plans/Goals Discussed with patient  -AC        Barriers to Rehab medically complex;cognitive status;previous functional deficit  -        Patient's Goals for Discharge return to PO diet  asking for water  -AC           Pain Scale: FACES Pre/Post-Treatment    Pain: FACES Scale, Pretreatment 0-->no hurt  -AC        Posttreatment Pain Rating 0-->no hurt  -AC           Oral Motor Structure and Function    Dentition Assessment missing teeth;teeth are in poor condition  -AC        Secretion Management wet vocal quality;requires suctioning to control secretions  RN had performed NT suctioning prior to eval; required oral suctioning w/ [place]auer several times during eval  -AC        Mucosal Quality sticky  -AC        Volitional Swallow unable to elicit  -AC        Volitional Cough weak;reduced respiratory support  intermittently able to initiate w/ cues  -AC           Oral Musculature and Cranial Nerve Assessment    Oral Motor General Assessment generalized oral motor weakness  -           General Eating/Swallowing Observations    Respiratory Support Currently in Use nasal cannula  -AC        Eating/Swallowing Skills fed by SLP  -AC        Positioning During Eating upright 90 degree;upright in bed  -           Clinical Swallow Eval    Clinical Swallow Evaluation Summary Pt aphonic w/ poor secretion mgt. Wet vocal quality at baseline that pt was u/a to clear w/ cues/suctioning. Pt had difficulty initiating swallow after moistening mouth  w/ toothette swab. DNT further PO trials 2' severity of s/sxs aspiration. Suspect aspirating secretions.  -AC           SLP Evaluation Clinical Impression    SLP Swallowing Diagnosis suspected pharyngeal dysphagia  suspect severe based on clinical presentation  -AC        Functional Impact risk of aspiration/pneumonia  -AC        Rehab Potential/Prognosis, Swallowing re-evaluate goals as necessary  -AC        Swallow Criteria for Skilled Therapeutic Interventions Met demonstrates skilled criteria  -AC           Recommendations    Therapy Frequency (Swallow) 5 days per week  -AC        Predicted Duration Therapy Intervention (Days) until discharge  -AC        SLP Diet Recommendation NPO;temporary alternate methods of nutrition/hydration  -AC        Recommended Diagnostics --  will continue to assess in tx--suspect may be some time before pt clinically appropriate to participate in FEES  -AC        Oral Care Recommendations Oral Care BID/PRN;Suction toothbrush  -AC        SLP Rec. for Method of Medication Administration meds via alternate route  -AC        Anticipated Discharge Disposition (SLP) inpatient rehabilitation facility  -                  User Key  (r) = Recorded By, (t) = Taken By, (c) = Cosigned By      Initials Name Effective Dates     Kimberlee Castillo, MS CCC-SLP 02/03/23 -                     EDUCATION  The patient has been educated in the following areas:   Dysphagia (Swallowing Impairment) Oral Care/Hydration NPO rationale.        SLP GOALS       Row Name 03/25/24 1030             (LTG) Patient will demonstrate functional swallow for    Diet Texture (Demonstrate functional swallow) pureed textures  -AC      Liquid viscosity (Demonstrate functional swallow) nectar/ mildly thick liquids  -AC      [place] (Demonstrate functional swallow) with 1:1 assist/ supervision  -AC      Time Frame (Demonstrate functional swallow) by discharge  -AC         (STG) Patient will tolerate therapeutic trials of     Consistencies Trialed (Tolerate therapeutic trials) thin liquids;nectar/ mildly thick liquids;pureed textures  -AC      Desired Outcome (Tolerate therapeutic trials) without signs/symptoms of aspiration;without signs of distress;with adequate oral prep/transit/clearance  -AC      [place] (Tolerate therapeutic trials) with 1:1 assist/ supervision  -AC      Time Frame (Tolerate therapeutic trials) 1 week  -AC      Comment (Tolerate therapeutic trials) As appropriate pending secretion mgt  -AC         (STG) Pharyngeal Strengthening Exercise Goal 1 (SLP)    Activity (Pharyngeal Strengthening Goal 1, SLP) increase pharyngeal sensation;increase timing  -AC      Increase Pharyngeal Sensation gustatory stimulation (sour/cold)  -AC      Increase Timing prepping - 3 second prep or suck swallow or 3-step swallow  -AC      [place]/Accuracy (Pharyngeal Strengthening Goal 1, SLP) with maximum cues (25-49% accuracy)  -AC      Time Frame (Pharyngeal Strengthening Goal 1, SLP) 1 week  -AC                User Key  (r) = Recorded By, (t) = Taken By, (c) = Cosigned By      Initials Name Provider Type     Kimberlee Castillo MS CCC-SLP Speech and Language Pathologist                       Time Calculation:    Time Calculation- SLP       Row Name 03/25/24 1109             Time Calculation- SLP    SLP Start Time 1030  -AC      SLP Received On 03/25/24  -AC         Untimed Charges    22974-VC Eval Oral Pharyng Swallow Minutes 43  -AC         Total Minutes    Untimed Charges Total Minutes 43  -AC       Total Minutes 43  -AC                User Key  (r) = Recorded By, (t) = Taken By, (c) = Cosigned By      Initials Name Provider Type     Kimberlee Castillo MS CCC-SLP Speech and Language Pathologist                    Therapy Charges for Today       Code Description Service Date Service Provider Modifiers Qty    [id number]  ST EVAL ORAL PHARYNG SWALLOW 3 3/25/2024 Kimberlee Castillo MS CCC-SLP GN 1                 Kimberlee Castillo MS  CCC-SLP  3/25/2024

## 2024-03-25 NOTE — THERAPY EVALUATION
Patient Name: Omkar Nava  : 1955    MRN: [id number]                              Today's Date: 3/25/2024       Admit Date: 3/10/2024    Visit Dx:     ICD-10-CM ICD-9-CM   1. Acute respiratory failure with hypoxia  J96.01 518.81   2. History of dementia  Z86.59 V11.8   3. Pneumonia due to infectious organism, unspecified laterality, unspecified part of lung  J18.9 486   4. Hypothermia, initial encounter  T68.XXXA 991.6   5. Anemia, unspecified type  D64.9 285.9   6. Hyperkalemia  E87.5 276.7   7. Encephalopathy  G93.40 348.30   8. Protein-calorie malnutrition, unspecified severity  E46 263.9   9. Iron deficiency anemia, unspecified iron deficiency anemia type  D50.9 280.9   10. 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, without long-term current use of insulin    Orthostatic hypotension    Acute osteomyelitis of left foot    Type 2 diabetes mellitus    Essential hypertension    Psychotic disorder    Neurocognitive disorder    S/P transmetatarsal amputation of foot, left    AMS (altered mental status)    Hypoxia    Abscess of left foot    Anemia of chronic disease    Aspiration pneumonia    Cellulitis of left toe    Cellulitis of lower extremity    Cervical spine pain    Chronic kidney disease    Closed head injury without loss of consciousness    Dementia    Dental cavities    Diarrhea of presumed infectious origin    Displaced fracture of proximal phalanx of left great toe, initial encounter for open fracture    Dysphagia    Erectile dysfunction    Fall    Gas gangrene    Generalized muscle weakness    Hallucinations    Hypertensive heart and chronic kidney disease 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 failure, with stage 1 through stage 4 chronic kidney disease, or unspecified chronic kidney disease    Insomnia due to medical  condition    Iron deficiency anemia    Klebsiella pneumoniae (k. pneumoniae) as the cause of diseases classified elsewhere    Laceration without foreign body, left foot, subsequent encounter    Long term (current) use of insulin    Other acute osteomyelitis, left ankle and foot    Personal history of Methicillin resistant Staphylococcus aureus infection    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    Protein calorie malnutrition    Simple chronic bronchitis    Tobacco use    Type 2 diabetes mellitus with diabetic neuropathy, unspecified    Wound infection, posttraumatic    Type 2 diabetes mellitus with diabetic chronic kidney disease    Encephalopathy    Bilateral pneumonia    Acute kidney injury superimposed on chronic kidney disease    Acute type 1 respiratory failure     Past Medical History:   Diagnosis Date    Anemia     Dementia     Diabetes mellitus     Dysphagia     GERD (gastroesophageal reflux disease)     History of alcohol abuse     History of cocaine use     History of marijuana use     Hypertension     Osteomyelitis     Poor historian     records obtained from nursing home records & his family    Visual impairment      Past Surgical History:   Procedure Laterality Date    AMPUTATION FOOT Left 10/18/2022    Procedure: PARTIAL FIRST RAY AMPUTATION LEFT;  Surgeon: Yeison Petty MD;  Location:  [facility];  Service: Orthopedics;  Laterality: Left;    AMPUTATION FOOT Left 12/5/2022    Procedure: Transmetatarsal of Left Foot;  Surgeon: Yeison Petty MD;  Location:  [facility];  Service: Orthopedics;  Laterality: Left;    ENDOSCOPY N/A 3/14/2024    Procedure: ESOPHAGOGASTRODUODENOSCOPY WITH JEJUNAL TUBE INSERTION AT BEDSIDE;  Surgeon: Brunner, Mark I, MD;  Location:  [facility];  Service: Gastroenterology;  Laterality: N/A;    EYE SURGERY        General Information       Row Name 03/25/24 1134          OT Time and Intention    Document Type evaluation  -CS     Mode of Treatment occupational therapy  -CS       Row Name  03/25/24 1134          General Information    Patient Profile Reviewed yes  -CS     Prior Level of Function --  Pt limited historian, TBA further  -CS     Existing Precautions/Restrictions fall;oxygen therapy device and L/min;other (see comments)  NG, L transmetatarsal amp remote  -CS     Barriers to Rehab medically complex;cognitive status;previous functional deficit;visual deficit  -CS       Row Name 03/25/24 1134          Living Environment    People in Home facility resident  -CS       Row Name 03/25/24 1134          Cognition    Orientation Status (Cognition) oriented to;person;disoriented to;place;situation;time  -CS       Row Name 03/25/24 1134          Safety Issues, Functional Mobility    Impairments Affecting Function (Mobility) balance;cognition;coordination;endurance/activity tolerance;motor control;postural/trunk control;shortness of breath;strength;visual/perceptual;grasp  -CS     Cognitive Impairments, Mobility Safety/Performance attention;insight into deficits/self-awareness;sequencing abilities  -CS               User Key  (r) = Recorded By, (t) = Taken By, (c) = Cosigned By      Initials Name Provider Type    CS Sarah Jade OT Occupational Therapist                     Mobility/ADL's       Row Name 03/25/24 1139          Bed Mobility    Bed Mobility rolling left;rolling right;supine-sit  -CS     Rolling Left [place] (Bed Mobility) dependent (less than 25% patient effort);2 person assist;verbal cues  -CS     Rolling Right [place] (Bed Mobility) dependent (less than 25% patient effort);2 person assist;verbal cues  -CS     Supine-Sit [place] (Bed Mobility) dependent (less than 25% patient effort);2 person assist;verbal cues  -CS     Assistive Device (Bed Mobility) draw sheet;bed rails  -CS       Row Name 03/25/24 1139          Activities of Daily Living    BADL Assessment/Intervention lower body dressing;grooming  -CS       Row Name 03/25/24 1135          Lower Body Dressing  Assessment/Training    [place] Level (Lower Body Dressing) don;socks;dependent (less than 25% patient effort)  -CS     Position (Lower Body Dressing) supine  -CS       Row Name 03/25/24 1135          Grooming Assessment/Training    [place] Level (Grooming) oral care regimen;dependent (less than 25% patient effort)  -CS     Position (Grooming) sitting up in bed  -CS     Comment, (Grooming) [place]  -CS               User Key  (r) = Recorded By, (t) = Taken By, (c) = Cosigned By      Initials Name Provider Type    CS Sarah Jade OT Occupational Therapist                   Obj/Interventions       Row Name 03/25/24 1136          Sensory Assessment (Somatosensory)    Sensory Assessment (Somatosensory) unable/difficult to assess  -CS       Row Name 03/25/24 1136          Vision Assessment/Intervention    Visual Impairment/Limitations unable/difficult to assess  -CS       Row Name 03/25/24 1136          Range of Motion Comprehensive    General Range of Motion bilateral upper extremity ROM WFL  -       Row Name 03/25/24 1136          Strength Comprehensive (MMT)    Comment, General Manual Muscle Testing (MMT) Assessment BUE observed grossly 2-/5; limited formal assessment d/t cognitive status  -       Row Name 03/25/24 1136          Balance    Balance Assessment sitting static balance;sitting dynamic balance  -CS     Static Sitting Balance maximum assist  -CS     Dynamic Sitting Balance dependent  -CS     Position, Sitting Balance sitting edge of bed  -CS     Balance Interventions sitting;static;dynamic;dynamic reaching;weight shifting activity  -CS     Comment, Balance A/P lean x6, R/L weight shift x6, significant posterior lean  -CS               User Key  (r) = Recorded By, (t) = Taken By, (c) = Cosigned By      Initials Name Provider Type    CS Sarah Jade OT Occupational Therapist                   Goals/Plan       Row Name 03/25/24 1138          Grooming Goal 1 (OT)    Activity/Device (Grooming  Goal 1, OT) wash face, hands  -CS     [place] (Grooming Goal 1, OT) minimum assist (75% or more patient effort)  -CS     Time Frame (Grooming Goal 1, OT) long term goal (LTG);10 days  -CS     Progress/Outcome (Grooming Goal 1, OT) goal ongoing  -CS       Row Name 03/25/24 1138          Strength Goal 1 (OT)    Strength Goal 1 (OT) Pt will tolerate BUE AAROM HEP x15 reps.  -CS     Time Frame (Strength Goal 1, OT) long term goal (LTG);10 days  -CS     Progress/Outcome (Strength Goal 1, OT) goal ongoing  -CS       Row Name 03/25/24 1138          Problem Specific Goal 1 (OT)    Problem Specific Goal 1 (OT) Pt will tolerate static sitting balance w/ Min A for ~2 mins.  -CS     Time Frame (Problem Specific Goal 1, OT) long term goal (LTG);10 days  -CS     Progress/Outcome (Problem Specific Goal 1, OT) goal ongoing  -CS       Row Name 03/25/24 1138          Therapy Assessment/Plan (OT)    Planned Therapy Interventions (OT) adaptive equipment training;activity tolerance training;BADL retraining;functional balance retraining;occupation/activity based interventions;ROM/therapeutic exercise;strengthening exercise;transfer/mobility retraining  -CS               User Key  (r) = Recorded By, (t) = Taken By, (c) = Cosigned By      Initials Name Provider Type    CS Sarah Jade, OT Occupational Therapist                   Clinical Impression       Row Name 03/25/24 1136          Pain Assessment    Pain Intervention(s) Repositioned;Ambulation/increased activity  -CS     Additional Documentation Pain Scale: FACES Pre/Post-Treatment (Group)  -CS       Row Name 03/25/24 1136          Pain Scale: FACES Pre/Post-Treatment    Pain: FACES Scale, Pretreatment 2-->hurts little bit  -CS     Posttreatment Pain Rating 2-->hurts little bit  -CS     Pain Location generalized  -CS     Pre/Posttreatment Pain Comment tolerated  -CS       Row Name 03/25/24 1136          Plan of Care Review    Plan of Care Reviewed With patient  -CS     Progress  no change  -CS     Outcome Evaluation OT eval complete. Pt presents w/ deficits in cognition, strength, and balance warranting cont skilled IPOT POC to promote return to baseline. Recommend pt DC to SNF.  -CS       Row Name 03/25/24 1136          Therapy Assessment/Plan (OT)    Patient/Family Therapy Goal Statement (OT) Return to PLOF  -CS     Rehab Potential (OT) good, to achieve stated therapy goals  -CS     Criteria for Skilled Therapeutic Interventions Met (OT) yes;skilled treatment is necessary  -CS     Therapy Frequency (OT) daily  -CS       Row Name 03/25/24 1136          Therapy Plan Review/Discharge Plan (OT)    Anticipated Discharge Disposition (OT) inpatient rehabilitation facility  -CS       Row Name 03/25/24 1136          Vital Signs    Pre Systolic BP Rehab 125  -CS     Pre Treatment Diastolic BP 66  -CS     Post Systolic BP Rehab 131  -CS     Post Treatment Diastolic BP 56  -CS     Pretreatment Heart Rate (beats/min) 70  -CS     Posttreatment Heart Rate (beats/min) 73  -CS     Pre SpO2 (%) 97  -CS     O2 Delivery Pre Treatment nasal cannula  -CS     Post SpO2 (%) 98  -CS     O2 Delivery Post Treatment nasal cannula  -CS     Pre Patient Position Supine  -CS     Intra Patient Position Sitting  -CS     Post Patient Position Sitting  -CS       Row Name 03/25/24 1136          Positioning and Restraints    Pre-Treatment Position in bed  -CS     Post Treatment Position bed  -CS     In Bed notified nsg;sitting EOB;with PT;call light within reach;encouraged to call for assist;with nsg  -CS               User Key  (r) = Recorded By, (t) = Taken By, (c) = Cosigned By      Initials Name Provider Type    CS Sarah Jade, OT Occupational Therapist                   Outcome Measures       Row Name 03/25/24 1139          How much help from another is currently needed...    Putting on and taking off regular lower body clothing? 1  -CS     Bathing (including washing, rinsing, and drying) 1  -CS     Toileting (which  includes using toilet bed pan or urinal) 1  -CS     Putting on and taking off regular upper body clothing 1  -CS     Taking care of personal grooming (such as brushing teeth) 2  -CS     Eating meals 1  -CS     AM-PAC 6 Clicks Score (OT) 7  -CS       Row Name 03/25/24 1043          How much help from another person do you currently need...    Turning from your back to your side while in flat bed without using bedrails? 2  -ES     Moving from lying on back to sitting on the side of a flat bed without bedrails? 1  -ES     Moving to and from a bed to a chair (including a wheelchair)? 1  -ES     Standing up from a chair using your arms (e.g., wheelchair, bedside chair)? 1  -ES     Climbing 3-5 steps with a railing? 1  -ES     To walk in hospital room? 1  -ES     AM-PAC 6 Clicks Score (PT) 7  -ES     Highest Level of Mobility Goal 2 --> Bed activities/dependent transfer  -ES       Row Name 03/25/24 1139 03/25/24 1043       Functional Assessment    Outcome Measure Options AM-PAC 6 Clicks Daily Activity (OT)  -CS AM-PAC 6 Clicks Basic Mobility (PT)  -ES              User Key  (r) = Recorded By, (t) = Taken By, (c) = Cosigned By      Initials Name Provider Type    Sarah Renae, OT Occupational Therapist    ES Emmy Castellanos, PT Physical Therapist                    Occupational Therapy Education       Title: PT OT SLP Therapies (In Progress)       Topic: Occupational Therapy (In Progress)       Point: ADL training (In Progress)       Description:   Instruct learner(s) on proper safety adaptation and remediation techniques during self care or transfers.   Instruct in proper use of assistive devices.                  Learning Progress Summary             Patient Acceptance, E, NR by CS at 3/25/2024 1139   Family Acceptance, E, NR by RL at 3/16/2024 0108                         Point: Home exercise program (In Progress)       Description:   Instruct learner(s) on appropriate technique for monitoring, assisting and/or  progressing therapeutic exercises/activities.                  Learning Progress Summary             Family Acceptance, E, NR by RL at 3/16/2024 0108                         Point: Precautions (In Progress)       Description:   Instruct learner(s) on prescribed precautions during self-care and functional transfers.                  Learning Progress Summary             Patient Acceptance, E, NR by CS at 3/25/2024 1139   Family Acceptance, E, NR by RL at 3/16/2024 0108                         Point: Body mechanics (In Progress)       Description:   Instruct learner(s) on proper positioning and spine alignment during self-care, functional mobility activities and/or exercises.                  Learning Progress Summary             Patient Acceptance, E, NR by CS at 3/25/2024 1139   Family Acceptance, E, NR by RL at 3/16/2024 0108                                         User Key       Initials Effective Dates Name Provider Type Discipline     06/16/21 -  Beth Land RN Registered Nurse Nurse     09/02/21 -  Sarah Jade OT Occupational Therapist OT                  OT Recommendation and Plan  Planned Therapy Interventions (OT): adaptive equipment training, activity tolerance training, BADL retraining, functional balance retraining, occupation/activity based interventions, ROM/therapeutic exercise, strengthening exercise, transfer/mobility retraining  Therapy Frequency (OT): daily  Plan of Care Review  Plan of Care Reviewed With: patient  Progress: no change  Outcome Evaluation: OT eval complete. Pt presents w/ deficits in cognition, strength, and balance warranting cont skilled IPOT POC to promote return to baseline. Recommend pt DC to SNF.     Time Calculation:   Evaluation Complexity (OT)  Review Occupational Profile/Medical/Therapy History Complexity: expanded/moderate complexity  Assessment, Occupational Performance/Identification of Deficit Complexity: 5 or more performance deficits  Clinical Decision  Making Complexity (OT): comprehensive assessment/high complexity  Overall Complexity of Evaluation (OT): moderate complexity     Time Calculation- OT       Row Name 03/25/24 1140             Time Calculation- OT    OT Start Time 0950  -CS      OT Received On 03/25/24  -CS      OT Goal Re-Cert Due Date 04/04/24  -CS         Untimed Charges    OT Eval/Re-eval Minutes 46  -CS         Total Minutes    Untimed Charges Total Minutes 46  -CS       Total Minutes 46  -CS                User Key  (r) = Recorded By, (t) = Taken By, (c) = Cosigned By      Initials Name Provider Type    CS Sarah Jade OT Occupational Therapist                  Therapy Charges for Today       Code Description Service Date Service Provider Modifiers Qty    [id number] HC OT EVAL MOD COMPLEXITY 4 3/25/2024 Sarah Jade OT GO 1                 Sarah Jade OT  3/25/2024

## 2024-03-25 NOTE — PLAN OF CARE
Goal Outcome Evaluation:  Plan of Care Reviewed With: patient        Progress: no change  Outcome Evaluation: OT eval complete. Pt presents w/ deficits in cognition, strength, and balance warranting cont skilled IPOT POC to promote return to baseline. Recommend pt DC to SNF.      Anticipated Discharge Disposition (OT): inpatient rehabilitation facility

## 2024-03-25 NOTE — PROGRESS NOTES
INTENSIVIST   PROGRESS NOTE     Hospital:  LOS: 15 days     Mr. Omkar Nava, 68 y.o. male is followed for a Chief Complaint of: Respiratory failure, pneumonia      Subjective   S     Interval History:  Extubated yesterday.    No complications.    Na improving.    Temp  Min: 97.3 °F (36.3 °C)  Max: 98.1 °F (36.7 °C)      The patient's relevant past medical, surgical and social history were reviewed and updated in Epic as appropriate.        History     Last Reviewed by Emmy Castellanos, KYRA on 3/25/2024 at 10:35 AM    Sections Reviewed    Medical, Surgical, Family, Tobacco, Custom, Alcohol, Drug Use, Sexual   Activity    Problem list reviewed by Salo Arellano MD on 3/23/2024 at  1:36 PM  Medicines reviewed by Salo Arellano MD on 3/23/2024 at  1:36 PM  Allergies reviewed by Salo Arellano MD on 3/23/2024 at  1:36 PM          OBJECTIVE     Vitals:  Temp: 97.9 °F (36.6 °C) (24 1300) Temp  Min: 97.3 °F (36.3 °C)  Max: 98.1 °F (36.7 °C)   Temp core:      BP: 116/44 (24 1400) BP  Min: 109/53  Max: 140/55   MAP (non-invasive) Noninvasive MAP (mmHg): 74 (24 1400) Noninvasive MAP (mmHg)  Av.2  Min: 28  Max: 138   Pulse: 64 (24 1400) Pulse  Min: 59  Max: 74   Resp: 18 (24 1300) Resp  Min: 18  Max: 24   SpO2: 96 % (24 1400) SpO2  Min: 92 %  Max: 100 %   Device: humidified, nasal cannula (24 1400)    Flow Rate: 2 (24 1400) Flow (L/min)  Min: 2  Max: 3         24  0600 24  0500   Weight: 98.2 kg (216 lb 7.9 oz) 93 kg (205 lb 0.4 oz)        Intake/Ouptut 24 hrs (7:00AM - 6:59 AM)  Intake & Output (last 2 days)          0701   0700  0701   0700  07 0700    I.V. (mL/kg) 663.3 (6.8) 1340.3 (14.4) 850 (9.1)    Other 530 431 162    NG/ 1014 358    IV Piggyback       Total Intake(mL/kg) 2187.3 (22.3) 2785.3 (29.9) 1370 (14.7)    Urine (mL/kg/hr) 2580 (1.1) 2365 (1.1) 700 (1)    Emesis/NG output  0     Stool 1150 500     Total  Output 3730 2865 700    Net -1542.7 -79.7 +670           Emesis Unmeasured Occurrence  1 x             Physical Examination  Telemetry:  Rhythm: normal sinus rhythm (03/25/24 0800)         Constitutional:  No acute distress.   Cardiovascular: RRR.    Respiratory: Normal breath sounds  No adventitious sounds   Abdominal:  Soft with no tenderness.   Extremities: Edema   Neurological:   Awake.  Best Eye Response: 3-->(E3) to speech (03/25/24 0800)  Best Motor Response: 6-->(M6) obeys commands (03/25/24 0800)  Best Verbal Response: 1-->(V1) none (03/25/24 0800)  Crescencio Coma Scale Score: 10 (03/25/24 0800)     Results Reviewed:  Laboratory  Microbiology  Radiology  Pathology    Hematology:  Results from last 7 days   Lab Units 03/25/24  0027 03/24/24  0420 03/23/24  0421   WBC 10*3/mm3 11.01* 11.84* 11.48*   HEMOGLOBIN g/dL 7.8* 8.3* 8.2*   MCV fL 92.4 91.4 90.5   PLATELETS 10*3/mm3 463* 498* 471*     Results from last 7 days   Lab Units 03/25/24  0027 03/24/24  0420   NEUTROS ABS 10*3/mm3 9.09* 9.31*   LYMPHS ABS 10*3/mm3 1.01 1.37   EOS ABS 10*3/mm3 0.23 0.20     Chemistry:  Estimated Creatinine Clearance: 76.5 mL/min (by C-G formula based on SCr of 1.25 mg/dL).    Results from last 7 days   Lab Units 03/25/24  0027 03/24/24  0420   SODIUM mmol/L 145 149*   POTASSIUM mmol/L 3.9 4.5   CHLORIDE mmol/L 112* 114*   CO2 mmol/L 27.0 28.0   BUN mg/dL 40* 42*   CREATININE mg/dL 1.25 1.39*   GLUCOSE mg/dL 235* 160*     Results from last 7 days   Lab Units 03/25/24  0027 03/24/24  0420 03/23/24  1330 03/23/24  0420   CALCIUM mg/dL 7.6* 8.1*  --  7.9*   MAGNESIUM mg/dL 1.9  --   --  2.1   PHOSPHORUS mg/dL 3.4 3.2   < > 2.1*    < > = values in this interval not displayed.     COVID-19  Lab Results   Component Value Date    COVID19 Not Detected 03/10/2024    COVID19 Not Detected 03/10/2024    COVID19 Not Detected 02/05/2024    COVID19 Not Detected 02/05/2024       Arterial Blood Gases:  Results from last 7 days   Lab Units  03/24/24  1725 03/24/24  1405 03/24/24  0347   PH, ARTERIAL pH units 7.414 7.442 7.463*   PCO2, ARTERIAL mm Hg 44.6 42.3 38.4   PO2 ART mm Hg 81.7* 70.3* 89.9   FIO2 % 28 30 30     Images:  No radiology results for the last day    Echo:  Results for orders placed during the hospital encounter of 01/15/24    Adult Transthoracic Echo Complete With Contrast if Necessary Per Protocol    Interpretation Summary    Left ventricular ejection fraction appears to be 56 - 60%.    Left ventricular wall thickness is consistent with hypertrophy.    Estimated right ventricular systolic pressure from tricuspid regurgitation is mildly elevated (35-45 mmHg).    There is a small (1-2cm) pericardial effusion.    No evidence for pericardial tamponade      Results: Reviewed.  I reviewed the patient's new laboratory and imaging results.  I independently reviewed the patient's new images.    Medications: Reviewed.    Assessment   A/P     Hospital:  LOS: 15 days   ICU: 15d 2h     Active Hospital Problems    Diagnosis  POA    **Acute type 1 respiratory failure [J96.01]  Yes    Acute kidney injury superimposed on chronic kidney disease [N17.9, N18.9]  Yes    AMS (altered mental status) [R41.82]  Yes    Dysphagia [R13.10]  Yes    Dementia [F03.90]  Yes    Protein calorie malnutrition [E46]  Yes    Neurocognitive disorder [R41.9]  Yes    Essential hypertension [I10]  Yes    Type 2 diabetes mellitus with diabetic chronic kidney disease [E11.22]  Yes    Iron deficiency anemia [D50.9]  Yes     Formatting of this note might be different from the original.   Stable Hb , weekly labs    -Continue iron sulplement daily.       Omkar is a 68 y.o. male admitted on 3/10/2024 with Healthcare-associated pneumonia [J18.9]  Acute respiratory failure with hypoxemia [J96.01]    Omkar Nava is a 68 y.o. male dependent for mobility, bathing and dressing, who is now admitted for his third admission in the last 2.5 months for an aspiration event with aspiration  pneumonia.      On his previous admission a modified barium swallow did reveal moderate oral and mild pharyngeal dysphagia February 5, 2024.    Respiratory status deteriorated overnight on March 10 and he required intubation.    CTA of the chest was repeated and was negative for PE but revealed worsening bilateral pneumonia.      Assessment/Management/Treatment Plan:    Respiratory Failure  Intubated.  Invasive Mechanical Ventilation   Extubated 03/24/24  ESBL Klebsiella pneumonia  Cardiovascular  HFpEF  HTN  Neuro  Dementia  Psychosis  Dysphagia  Renal  JAYY/CKD  Hematology  Chronic anemia, Iron deficiency  Nutrition Support     NG/OG Tube Nasojejunal 12 Fr Right nostril-Tube Feeding Product: Isosource 1.5  Novasource Renal (2.0 kcal/mL, 91 g protein/L, no fiber)  [REMOVED] NG/OG Tube 16 Fr Left nostril-Tube Feeding Rate (mL/hr): 0 mL/HR  NG/OG Tube Nasojejunal 12 Fr Right nostril-Tube Feeding Rate (mL/hr): 46 mL/HR (Based on a feeding duration of 20 h/d)      Lab Results   Component Value Date    PREALBUMIN 13.8 (L) 03/25/2024    CRP 4.73 (H) 03/25/2024    CRP 0.59 (H) 08/02/2023    CRP 0.30 03/06/2023    CRP 0.95 (H) 12/07/2022     Endocrine   Body mass index is 27.81 kg/m². Overweight: 25.0-29.9kg/m2   Type 2 diabetes.  Complicated by blindness.     Lab Results   Lab Value Date/Time    HGBA1C 7.00 (H) 10/16/2022 0432    HGBA1C 8.7 (H) 06/25/2022 0242    HGBA1C 13.4 04/14/2022 1058     Results from last 7 days   Lab Units 03/25/24  1112 03/25/24  0506 03/24/24  2358 03/24/24  1719 03/24/24  1115 03/24/24  0505 03/23/24  2319 03/23/24  2145   GLUCOSE mg/dL 279* 242* 239* 214* 192* 160* 171* 195*       Diet: NPO Diet NPO Type: Strict NPO   Advance Directives: Code Status and Medical Interventions:   Ordered at: 03/10/24 1302     Code Status (Patient has no pulse and is not breathing):    CPR (Attempt to Resuscitate)     Medical Interventions (Patient has pulse or is breathing):    Full Support        DVT  prophylaxis:  Medical and mechanical DVT prophylaxis orders are present.         In brief:  Monitor hypernatremia. Improving.  Continue Enteral Nutrition but change to standard formula. No need for renal/low K/low Phosphorus formula.  Continue Ertapenem x 10 days total, as scheduled.  Goal: Glucose < 180 mg/dL.   Insulin SQ - Increase dose.  F/U labs in AM.  Disposition: Keep in ICU.    Plan of care and goals reviewed during interdisciplinary rounds.  I discussed the patient's findings and my recommendations with family and nursing staff    MDM:    Problem(s) High due to: Acute or Chronic illness or injury that may poses a threat to life or bodily function  Data: Moderate due to: Review of prior external records from each unique source, Review or results of each unique test, and Ordering of each unique test    Moderate    [x] Primary Attending Intensive Care Medicine - Nutrition Support   [] Consultant    Copied text in this note has been reviewed and is accurate as of 03/25/24

## 2024-03-25 NOTE — PROGRESS NOTES
Clinical Nutrition     Nutrition Support Assessment  Reason for Visit: Follow-up protocol, EN    Patient Name: Omkar Nava  YOB: 1955  MRN: [id number]  Date of Encounter: 03/25/24 13:30 EDT  Admission date: 3/10/2024    EN management per Intensivist, to change to standard formula, initiate per order:    Isosource at 63 ml/hr, water 30 ml Q 2 hr. Provide Prosource 1x/day    Rx will supply:   Goal Volume 1260 mL/day       Flush Volume 300 mL/day       Energy 1950 Kcal/day 105 % Est Need   Protein 101 g/day 101 % Est Need   Fiber 19 g/day       Water in   mL       Total Water 1258 mL       Meet DRI No         +102 kcal from Q2=4819 total kcal    Note BG running high, insulin changes made, pt also receiving D5 IVF. difference in carbohydrates from previous regimen = 54 g more cho. Peptamen 1.5 would provide 70 g more cho. Increased protein and fiber in new regimen may also assist with BG.    Nutrition Assessment   Admission Diagnosis:  Healthcare-associated pneumonia [J18.9]  Acute respiratory failure with hypoxemia [J96.01]      Problem List:    Acute type 1 respiratory failure    Essential hypertension    Neurocognitive disorder    AMS (altered mental status)    Dementia    Dysphagia    Iron deficiency anemia    Protein calorie malnutrition    Type 2 diabetes mellitus with diabetic chronic kidney disease    Acute kidney injury superimposed on chronic kidney disease        PMH:   He  has a past medical history of Anemia, Dementia, Diabetes mellitus, Dysphagia, GERD (gastroesophageal reflux disease), History of alcohol abuse, History of cocaine use, History of marijuana use, Hypertension, Osteomyelitis, Poor historian, and Visual impairment.    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      Applicable Nutrition Concerns:   Skin:  Oral:  history of dysphagia - came in with chocking incident   GI: came with constipation; improved, now with diarrhea    Applicable Interval History:   (3/10) Intubated   (3/11) EN started - Novasource Renal   (3/20) NG replaced with NJ  (3/25) Changed to standard formula- Isosource 1.5   Extubated   SLP- NPO    Reported/Observed/Food/Nutrition Related History:     3/25) Pt continues tolerating EN. Is having significant FMS output. Fiber previously added was d/c per Intensivist. Renal fx improved, discussed in MDR Intensivist to change to standard formula. BG running high insulin changes made. Also receiving D5 infusion for previous hypernatremia, Na WNL today. IC was completed and needs reassessed. At time of this note pt has been extubated. SLP has seen and continues to recommend NPO    3/22) Tolerating EN. Phos dropping past 3 days, critically low this am and to be replaced, K low as well. Creatinine WNL today. Suspect low lytes 2/2 renal formula, however hesitant to change at this time given prolonged poor renal fx in setting of renal disease. Will add prosource to better meet protein needs and may assist with lytes as well. If no improvement in next 72 hr, will consider formula change. Pt now with FMS, will add some fiber for stool formation. Documented weights continue to be higher than suspected actual, several liters positive. Ordered IC.     3/21) Tolerating EN at goal. Bowels moving. NG replaced with NJ.      3/19) Pt tolerating EN without issue. Remains intubated/sedated. Bowels moving. Renal fx remains poor, nephrology following and increased water flushes over the weekend. Still hypernatremic, will increase further.     3/15) Pt with abd. distention yesterday and EN was held. Imagining showed mild excess fluid in bowel and stomach, stool burden. No BM yet, receiving senokot, colace, and miralax. Gastroenterology placed NJ in EGD yesterday. Discussed in MDR, Intensivist okay with restarting slow/low. Pt with  hypoglycemia while EN was on hold and D20 infusion added, discussed plan to stop this as EN advances to goal. Renal fx labs slightly worsened today. O2 sats improved and no current plans for proning pt.      3/14) Remains intubated, off sedation.  Overall improved renal status.  Patient was taken to CT of abdomen this morning; not clear reason, no documentation of any GI issues.    Discussed with RN caring for patient today. Reports patient was starting to have abdominal distention yesterday, EN was turned off; turned back on at some point. She noted patient with increased distention this morning which was not the case yesterday.  CT of abdomen and KUB results noted. It seem patient is still with significant stool burden.  RN giving ordered bowel regiment now - not given yesterday per MAR records?     3/11) Pt presents in ARF, intubated/sedated. Intensivist consulted for EN and has already placed order for Novasource which RD concurs with. Pt from NH and came to ED after choking incident. Pt with severe dementia and aggression. Advanced chronic diseases HF, CKD, DM, dysphagia. Pt is blind.   Pt has NG. Did have some emesis on POA, KUB showed stool burden, pt now has had several BMs. No further emesis. Pt with significantly high Potassium on POA, improved with several doses lokelma to 6.0 today. Renal fx labs poor, A/C renal failure per Nephrology. Pt was receiving propofol but this was stopped, plan to transition to precedex and fentanyl. Pt was also receiving NS @ 100 ml/hr which was d/c this am. No current vasopressor support.     Labs    Labs Reviewed: Yes     Results from last 7 days   Lab Units 03/25/24  0027 03/24/24  0420 03/23/24  1330 03/23/24  0420 03/22/24  1753 03/22/24  0424   GLUCOSE mg/dL 235* 160*  --  191*  --  150*   BUN mg/dL 40* 42*  --  38*  --  38*   CREATININE mg/dL 1.25 1.39*  --  1.15  --  1.15   SODIUM mmol/L 145 149*  --  145  --  147*   CHLORIDE mmol/L 112* 114*  --  111*  --  115*  "  POTASSIUM mmol/L 3.9 4.5  --  3.9   < > 3.4*   PHOSPHORUS mg/dL 3.4 3.2 2.8 2.1*   < > 1.9*   MAGNESIUM mg/dL 1.9  --   --  2.1  --  2.0   ALT (SGPT) U/L 380*  --   --   --   --   --     < > = values in this interval not displayed.       Results from last 7 days   Lab Units 03/25/24  0027 03/24/24  0420   ALBUMIN g/dL 2.4* 2.5*   PREALBUMIN mg/dL 13.8*  --    CRP mg/dL 4.73*  --    TRIGLYCERIDES mg/dL 106  --          Results from last 7 days   Lab Units 03/25/24  1112 03/25/24  0506 03/24/24  2358 03/24/24  1719 03/24/24  1115 03/24/24  0505   GLUCOSE mg/dL 279* 242* 239* 214* 192* 160*     Lab Results   Lab Value Date/Time    HGBA1C 7.00 (H) 10/16/2022 0432    HGBA1C 8.7 (H) 06/25/2022 0242    HGBA1C 13.4 04/14/2022 1058                   Medications    Medications Reviewed: Yes  Pertinent: colace, levemir 8units Q12hrs, insulin 6 units Q6hrs, Protonix, Prosource   Infusion: D5 @ 25 ml/hr  PRN:     Intake/Ouptut 24 hrs (0701 - 0700)   I&O's Reviewed: Yes   Intake & Output (last day)         03/24 0701 03/25 0700 03/25 0701 03/26 0700    I.V. (mL/kg) 1340.3 (14.4)     Other 431 162    NG/GT 1014 358    Total Intake(mL/kg) 2785.3 (29.9) 520 (5.6)    Urine (mL/kg/hr) 2365 (1.1) 700 (1.1)    Emesis/NG output 0     Stool 500     Total Output 2865 700    Net -79.7 -180          Emesis Unmeasured Occurrence 1 x           Anthropometrics     Height: Height: 182.9 cm (72\")  Last Filed Weight: Weight: 93 kg (205 lb 0.4 oz) (03/25/24 0500)  Method: Weight Method: Bed scale  BMI: BMI (Calculated): 27.8  BMI classification: Obese Class II: 35-39.9kg/m2  IBW:      UBW: fluctuates 2/2 CKD ~180-200 lb since 2022   Weight       Weight (kg) Weight (lbs) Weight Method Visit Report   8/9/2023 88.905 kg  196 lb  Bed scale     1/15/2024 93.441 kg  206 lb  Estimated     1/16/2024 90.855 kg  200 lb 4.8 oz  Bed scale      90 kg  198 lb 6.6 oz      1/17/2024 95.21 kg  209 lb 14.4 oz      1/18/2024 85.548 kg  188 lb 9.6 oz  Bed scale   " "  2024 82.691 kg  182 lb 4.8 oz  Bed scale     2024 83.054 kg  183 lb 1.6 oz  Bed scale     2024 85.14 kg  187 lb 11.2 oz  Bed scale     2024 85.1 kg  187 lb 9.8 oz  Estimated     2024 84.823 kg  187 lb   --    3/10/2024 84.823 kg  187 lb  Stated      98.1 kg  216 lb 4.3 oz  Bed scale     3/11/2024 90 kg  198 lb 6.6 oz  Bed scale       Weight change: no significant changes - suspect currently holding fluid    Nutrition Focused Physical Exam     Date: 3/11, 3/22    Unable to perform exam due to: Pt unable to participate at time of visit, Defer pending indication    Needs Assessment   Date: 3/11, reassessed 3/19, 3/22, 3/25    Height used:Height: 182.9 cm (72\")  Weights used: 85 kg / 187 lb (ABW), 53 kg / 117 lb (IBW)  *suspected dry weight likely ~185 lb given previously documented including recent MDOV weight. Likely currently holding fluid, will continue to monitor.    Estimated Calorie needs: ~1850 Kcal/day   Method:  Kcals/KG 15-20 = 9612-5037  Method:   IC = 1850 kcal, RQ=0.73      Estimated Protein needs: ~102 g PRO/day  Method: 1.2 g/Kg ABW = 102    Estimated Fluid needs: ~ ml/day   Per clinical status    Current Nutrition Prescription     PO: NPO Diet NPO Type: Strict NPO  Oral Nutrition Supplement:   Intake: N/A    EN: NovaSource Renal  Goal Rate: 40ml/hr   Water Flushes: 30ml/hr  Modular: None  Route: NG  Tube: Large bore    At goal over: 20Hrs/day    Rx will supply:   Goal Volume 920 mL/day       Flush Volume 600 mL/day       Energy 1900 Kcal/day 103 % Est Need   Protein 97 g/day 95 % Est Need   Fiber 0 g/day       Water in   mL       Total Water 1262 mL       Meet DRI No           --------------------------------------------------------------------------  Product/Rate verified at bedside: Yes   Infusing Rate at time of visit: 46 ml/hr    Average Delivery from [facility]:  Volume 894 mL/day 97  % Goal Vol.   Flush Volume 331 mL/day     Energy  Kcal/day  % Est Need "   Protein  g/day  % Est Need   Fiber  g/day     Water in  EN  mL     Total Water  mL     Meet DRI No          Nutrition Diagnosis   Date: 3/11  Updated: 3/14  Problem Inadequate oral intake   Etiology ARF requiring mechanical ventilation   Signs/Symptoms Intubated/sedated, NPO   Status: Receiving EN    Date: 3/11  Updated: 3/14  Problem Altered nutrition related laboratory values   Etiology A/C Renal Failure   Signs/Symptoms Creatinine 2.02, BUN 57, K 6.0   Status: Resolved- Creatinine WNL, BUN 40    Goal:   General: Nutrition to support treatment  PO: Advance diet as medically feasible/appropriate  EN/PN: Adjust EN, Deliver estimated needs    Nutrition Intervention      Follow treatment progress, Care plan reviewed    EN per Intensivist, change to standard formula  Will monitor SLP/diet progress    Monitoring/Evaluation:   Per protocol, I&O, Pertinent labs, EN delivery/tolerance, Weight, GI status, Symptoms, POC/GOC, Swallow function, Hemodynamic stability      Chuyita Moreno RD, CNSC  Time Spent: 30m

## 2024-03-25 NOTE — PLAN OF CARE
Goal Outcome Evaluation:  Plan of Care Reviewed With: patient        Progress: no change (PT IE)  Outcome Evaluation: PT eval complete. Pt presents with generalized weakness, impaired trunk/postural control, and decreased functional activity tolerance warranting skilled IPPT services. Pt requird dep-maxA x2 for bed mobility with strong posterior lean noted. PT rec SNF at d/c.      Anticipated Discharge Disposition (PT): skilled nursing facility

## 2024-03-25 NOTE — THERAPY EVALUATION
Patient Name: Omkar Nava  : 1955    MRN: [id number]                              Today's Date: 3/25/2024       Admit Date: 3/10/2024    Visit Dx:     ICD-10-CM ICD-9-CM   1. Acute respiratory failure with hypoxia  J96.01 518.81   2. History of dementia  Z86.59 V11.8   3. Pneumonia due to infectious organism, unspecified laterality, unspecified part of lung  J18.9 486   4. Hypothermia, initial encounter  T68.XXXA 991.6   5. Anemia, unspecified type  D64.9 285.9   6. Hyperkalemia  E87.5 276.7   7. Encephalopathy  G93.40 348.30   8. Protein-calorie malnutrition, unspecified severity  E46 263.9   9. 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, without long-term current use of insulin    Orthostatic hypotension    Acute osteomyelitis of left foot    Type 2 diabetes mellitus    Essential hypertension    Psychotic disorder    Neurocognitive disorder    S/P transmetatarsal amputation of foot, left    AMS (altered mental status)    Hypoxia    Abscess of left foot    Anemia of chronic disease    Aspiration pneumonia    Cellulitis of left toe    Cellulitis of lower extremity    Cervical spine pain    Chronic kidney disease    Closed head injury without loss of consciousness    Dementia    Dental cavities    Diarrhea of presumed infectious origin    Displaced fracture of proximal phalanx of left great toe, initial encounter for open fracture    Dysphagia    Erectile dysfunction    Fall    Gas gangrene    Generalized muscle weakness    Hallucinations    Hypertensive heart and chronic kidney disease 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 failure, with stage 1 through stage 4 chronic kidney disease, or unspecified chronic kidney disease    Insomnia due to medical condition    Iron deficiency anemia    Klebsiella  pneumoniae (k. pneumoniae) as the cause of diseases classified elsewhere    Laceration without foreign body, left foot, subsequent encounter    Long term (current) use of insulin    Other acute osteomyelitis, left ankle and foot    Personal history of Methicillin resistant Staphylococcus aureus infection    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    Protein calorie malnutrition    Simple chronic bronchitis    Tobacco use    Type 2 diabetes mellitus with diabetic neuropathy, unspecified    Wound infection, posttraumatic    Type 2 diabetes mellitus with diabetic chronic kidney disease    Encephalopathy    Bilateral pneumonia    Acute kidney injury superimposed on chronic kidney disease    Acute type 1 respiratory failure     Past Medical History:   Diagnosis Date    Anemia     Dementia     Diabetes mellitus     Dysphagia     GERD (gastroesophageal reflux disease)     History of alcohol abuse     History of cocaine use     History of marijuana use     Hypertension     Osteomyelitis     Poor historian     records obtained from nursing home records & his family    Visual impairment      Past Surgical History:   Procedure Laterality Date    AMPUTATION FOOT Left 10/18/2022    Procedure: PARTIAL FIRST RAY AMPUTATION LEFT;  Surgeon: Yeison Petty MD;  Location:  [facility];  Service: Orthopedics;  Laterality: Left;    AMPUTATION FOOT Left 12/5/2022    Procedure: Transmetatarsal of Left Foot;  Surgeon: Yeison Petty MD;  Location:  [facility];  Service: Orthopedics;  Laterality: Left;    ENDOSCOPY N/A 3/14/2024    Procedure: ESOPHAGOGASTRODUODENOSCOPY WITH JEJUNAL TUBE INSERTION AT BEDSIDE;  Surgeon: Brunner, Mark I, MD;  Location:  [facility];  Service: Gastroenterology;  Laterality: N/A;    EYE SURGERY        General Information       Row Name 03/25/24 1031          Physical Therapy Time and Intention    Document Type evaluation  -ES     Mode of Treatment physical therapy  -ES       Row Name 03/25/24 0146          General Information     Patient Profile Reviewed yes  -ES     Prior Level of Function dependent:;transfer;bed mobility;ADL's  LTC resident @ [facility]. Pt unable to state PLOF at this time, TBD further  -ES     Existing Precautions/Restrictions fall;oxygen therapy device and L/min;other (see comments)  remote L trasmetatarsal amp, legally blind  -ES     Barriers to Rehab medically complex;previous functional deficit;cognitive status;visual deficit  -ES       Row Name 03/25/24 1035          Living Environment    People in Home facility resident  -ES       Row Name 03/25/24 1035          Home Main Entrance    Number of Stairs, Main Entrance none  -ES       Row Name 03/25/24 1035          Stairs Within Home, Primary    Number of Stairs, Within Home, Primary none  -ES       Row Name 03/25/24 1035          Cognition    Orientation Status (Cognition) oriented to;person;disoriented to;place;time;verbal cues/prompts needed for orientation  -ES       Row Name 03/25/24 1035          Safety Issues, Functional Mobility    Safety Issues Affecting Function (Mobility) awareness of need for assistance;insight into deficits/self-awareness;judgment;problem-solving;safety precaution awareness;safety precautions follow-through/compliance;sequencing abilities  -ES     Impairments Affecting Function (Mobility) balance;cognition;coordination;endurance/activity tolerance;motor control;postural/trunk control;shortness of breath;strength;visual/perceptual  -ES     Cognitive Impairments, Mobility Safety/Performance attention;awareness, need for assistance;insight into deficits/self-awareness;problem-solving/reasoning;judgment;safety precaution awareness;safety precaution follow-through;sequencing abilities  -ES               User Key  (r) = Recorded By, (t) = Taken By, (c) = Cosigned By      Initials Name Provider Type    ES Emmy Castellanos PT Physical Therapist                   Mobility       Row Name 03/25/24 1038          Bed Mobility    Bed Mobility  sit-supine;scooting/bridging;rolling left;rolling right  -ES     Rolling Left [place] (Bed Mobility) dependent (less than 25% patient effort);2 person assist;verbal cues;nonverbal cues (demo/gesture)  -ES     Rolling Right [place] (Bed Mobility) dependent (less than 25% patient effort);2 person assist;verbal cues;nonverbal cues (demo/gesture)  -ES     Scooting/Bridging [place] (Bed Mobility) dependent (less than 25% patient effort);2 person assist;verbal cues;nonverbal cues (demo/gesture)  -ES     Sit-Supine [place] (Bed Mobility) dependent (less than 25% patient effort);2 person assist;verbal cues;nonverbal cues (demo/gesture)  -ES     Assistive Device (Bed Mobility) bed rails;draw sheet  -ES       Row Name 03/25/24 1038          Transfers    Comment, (Transfers) Further mobility deferred 2/2 resp status  -ES       Row Name 03/25/24 1038          Gait/Stairs (Locomotion)    Comment, (Gait/Stairs) Further mobility deferred 2/2 resp status  -ES               User Key  (r) = Recorded By, (t) = Taken By, (c) = Cosigned By      Initials Name Provider Type    ES Emmy Castellanos PT Physical Therapist                   Obj/Interventions       Row Name 03/25/24 1038          Range of Motion Comprehensive    General Range of Motion bilateral lower extremity ROM WFL  -ES       Row Name 03/25/24 1038          Strength Comprehensive (MMT)    General Manual Muscle Testing (MMT) Assessment lower extremity strength deficits identified  -ES     Comment, General Manual Muscle Testing (MMT) Assessment RLE grossly 3/5, LLE grossly 3+/5. Unable to formally assess due to cognitive status  -ES       Row Name 03/25/24 1038          Balance    Balance Assessment sitting static balance;sitting dynamic balance  -ES     Static Sitting Balance maximum assist;verbal cues;non-verbal cues (demo/gesture)  -ES     Dynamic Sitting Balance maximum assist;2-person assist;verbal cues;non-verbal cues (demo/gesture)  -ES      Position, Sitting Balance supported;sitting edge of bed  -ES     Balance Interventions sitting;supported;static;dynamic  -ES     Comment, Balance Demo'd strong posterior lean with L lateral lean.  -ES       Row Name 03/25/24 [employer]          Sensory Assessment (Somatosensory)    Sensory Assessment (Somatosensory) unable/difficult to assess  -ES               User Key  (r) = Recorded By, (t) = Taken By, (c) = Cosigned By      Initials Name Provider Type    ES Emmy Castellanos, PT Physical Therapist                   Goals/Plan       Row Name 03/25/24 1042          Bed Mobility Goal 1 (PT)    Activity/Assistive Device (Bed Mobility Goal 1, PT) sit to supine/supine to sit  -ES     [place] Level/Cues Needed (Bed Mobility Goal 1, PT) moderate assist (50-74% patient effort)  -ES     Time Frame (Bed Mobility Goal 1, PT) long term goal (LTG);10 days  -ES       Row Name 03/25/24 1042          Transfer Goal 1 (PT)    Activity/Assistive Device (Transfer Goal 1, PT) sit-to-stand/stand-to-sit;bed-to-chair/chair-to-bed;walker, rolling  -ES     [place] Level/Cues Needed (Transfer Goal 1, PT) moderate assist (50-74% patient effort)  -ES     Time Frame (Transfer Goal 1, PT) long term goal (LTG);10 days  -ES       Row Name 03/25/24 1042          Gait Training Goal 1 (PT)    Activity/Assistive Device (Gait Training Goal 1, PT) gait (walking locomotion);assistive device use;decrease fall risk;increase endurance/gait distance;walker, rolling  -ES     [place] Level (Gait Training Goal 1, PT) moderate assist (50-74% patient effort)  -ES     Distance (Gait Training Goal 1, PT) 10  -ES     Time Frame (Gait Training Goal 1, PT) long term goal (LTG);10 days  -ES       Row Name 03/25/24 1042          Therapy Assessment/Plan (PT)    Planned Therapy Interventions (PT) balance training;bed mobility training;gait training;home exercise program;neuromuscular re-education;patient/family education;strengthening;transfer training;postural  re-education;motor coordination training  -ES               User Key  (r) = Recorded By, (t) = Taken By, (c) = Cosigned By      Initials Name Provider Type    ES Emym Castellanos, PT Physical Therapist                   Clinical Impression       Row Name 03/25/24 1039          Pain    Pain Intervention(s) Repositioned;Ambulation/increased activity  -ES     Additional Documentation Pain Scale: FACES Pre/Post-Treatment (Group)  -ES       Row Name 03/25/24 1039          Pain Scale: FACES Pre/Post-Treatment    Pain: FACES Scale, Pretreatment 2-->hurts little bit  -ES     Posttreatment Pain Rating 2-->hurts little bit  -ES     Pain Location generalized  -ES     Pre/Posttreatment Pain Comment tolerated  -ES       Row Name 03/25/24 1039          Plan of Care Review    Plan of Care Reviewed With patient  -ES     Progress no change  PT IE  -ES     Outcome Evaluation PT eval complete. Pt presents with generalized weakness, impaired trunk/postural control, and decreased functional activity tolerance warranting skilled IPPT services. Pt requird dep-maxA x2 for bed mobility with strong posterior lean noted. PT rec SNF at d/c.  -ES       Row Name 03/25/24 1039          Therapy Assessment/Plan (PT)    Rehab Potential (PT) fair, will monitor progress closely  -ES     Criteria for Skilled Interventions Met (PT) yes;skilled treatment is necessary;meets criteria  -ES     Therapy Frequency (PT) daily  -ES       Row Name 03/25/24 1039          Vital Signs    Pre Systolic BP Rehab 125  -ES     Pre Treatment Diastolic BP 66  -ES     Post Systolic BP Rehab 131  -ES     Post Treatment Diastolic BP 56  -ES     Pretreatment Heart Rate (beats/min) 70  -ES     Posttreatment Heart Rate (beats/min) 67  -ES     Pre SpO2 (%) 99  -ES     O2 Delivery Pre Treatment nasal cannula  -ES     O2 Delivery Intra Treatment nasal cannula  -ES     Post SpO2 (%) 98  -ES     O2 Delivery Post Treatment nasal cannula  -ES     Pre Patient Position Sitting  -ES      Intra Patient Position Side Lying  -ES     Post Patient Position Supine  -ES       Row Name 03/25/24 1039          Positioning and Restraints    Pre-Treatment Position in bed  -ES     Post Treatment Position bed  -ES     In Bed notified nsg;fowlers;call light within reach;encouraged to call for assist;exit alarm on;patient within staff view;side rails up x2;LUE elevated;RUE elevated;legs elevated;waffle boots/both  -ES               User Key  (r) = Recorded By, (t) = Taken By, (c) = Cosigned By      Initials Name Provider Type    Emmy Arriaga, KYRA Physical Therapist                   Outcome Measures       Row Name 03/25/24 1043          How much help from another person do you currently need...    Turning from your back to your side while in flat bed without using bedrails? 2  -ES     Moving from lying on back to sitting on the side of a flat bed without bedrails? 1  -ES     Moving to and from a bed to a chair (including a wheelchair)? 1  -ES     Standing up from a chair using your arms (e.g., wheelchair, bedside chair)? 1  -ES     Climbing 3-5 steps with a railing? 1  -ES     To walk in hospital room? 1  -ES     AM-PAC 6 Clicks Score (PT) 7  -ES     Highest Level of Mobility Goal 2 --> Bed activities/dependent transfer  -ES       Row Name 03/25/24 1043          Functional Assessment    Outcome Measure Options AM-PAC 6 Clicks Basic Mobility (PT)  -ES               User Key  (r) = Recorded By, (t) = Taken By, (c) = Cosigned By      Initials Name Provider Type    Emmy Arriaga PT Physical Therapist                                 Physical Therapy Education       Title: PT OT SLP Therapies (In Progress)       Topic: Physical Therapy (In Progress)       Point: Mobility training (In Progress)       Learning Progress Summary             Patient Acceptance, E,TB, NR by ES at 3/25/2024 1043   Family Acceptance, E, NR by RL at 3/16/2024 0108                         Point: Home exercise program (In Progress)        Learning Progress Summary             Family Acceptance, E, NR by RL at 3/16/2024 0108                         Point: Body mechanics (In Progress)       Learning Progress Summary             Patient Acceptance, E,TB, NR by ES at 3/25/2024 1043   Family Acceptance, E, NR by RL at 3/16/2024 0108                         Point: Precautions (In Progress)       Learning Progress Summary             Patient Acceptance, E,TB, NR by ES at 3/25/2024 1043   Family Acceptance, E, NR by RL at 3/16/2024 0108                                         User Key       Initials Effective Dates Name Provider Type Discipline    RL 06/16/21 -  Beth Land RN Registered Nurse Nurse    ES 08/11/22 -  Emmy Castellanos, KYRA Physical Therapist PT                  PT Recommendation and Plan  Planned Therapy Interventions (PT): balance training, bed mobility training, gait training, home exercise program, neuromuscular re-education, patient/family education, strengthening, transfer training, postural re-education, motor coordination training  Plan of Care Reviewed With: patient  Progress: no change (PT IE)  Outcome Evaluation: PT eval complete. Pt presents with generalized weakness, impaired trunk/postural control, and decreased functional activity tolerance warranting skilled IPPT services. Pt requird dep-maxA x2 for bed mobility with strong posterior lean noted. PT rec SNF at d/c.     Time Calculation:   PT Evaluation Complexity  History, PT Evaluation Complexity: 1-2 personal factors and/or comorbidities  Examination of Body Systems (PT Eval Complexity): total of 3 or more elements  Clinical Presentation (PT Evaluation Complexity): evolving  Clinical Decision Making (PT Evaluation Complexity): moderate complexity  Overall Complexity (PT Evaluation Complexity): moderate complexity     PT Charges       Row Name 03/25/24 1044             Time Calculation    Start Time 1003  -ES      PT Received On 03/25/24  -ES      PT Goal Re-Cert Due  Date 04/04/24  -ES         Time Calculation- PT    Total Timed Code Minutes- PT 13 minute(s)  -ES         Timed Charges    87103 - PT Therapeutic Activity Minutes 13  -ES         Untimed Charges    PT Eval/Re-eval Minutes 32  -ES         Total Minutes    Timed Charges Total Minutes 13  -ES      Untimed Charges Total Minutes 32  -ES       Total Minutes 45  -ES                User Key  (r) = Recorded By, (t) = Taken By, (c) = Cosigned By      Initials Name Provider Type    ES Emmy Castellanos, PT Physical Therapist                  Therapy Charges for Today       Code Description Service Date Service Provider Modifiers Qty    47498956873 HC PT THERAPEUTIC ACT EA 15 MIN 3/25/2024 Emmy Castellanos, PT GP 1    50639687456 HC PT EVAL MOD COMPLEXITY 3 3/25/2024 Emmy Castellanos, PT GP 1            PT G-Codes  Outcome Measure Options: AM-PAC 6 Clicks Basic Mobility (PT)  AM-PAC 6 Clicks Score (PT): 7  PT Discharge Summary  Anticipated Discharge Disposition (PT): skilled nursing facility    Emmy Castellanos PT  3/25/2024

## 2024-03-25 NOTE — PROGRESS NOTES
"   LOS: 15 days    Patient Care Team:  Deann Cao MD as PCP - General (Internal Medicine)    Subjective     No new events    Objective     Vital Signs:  Blood pressure 125/66, pulse 71, temperature 98.1 °F (36.7 °C), temperature source Bladder, resp. rate 20, height 182.9 cm (72\"), weight 93 kg (205 lb 0.4 oz), SpO2 96%.      Intake/Output Summary (Last 24 hours) at 3/25/2024 1235  Last data filed at 3/25/2024 1000  Gross per 24 hour   Intake 2513.29 ml   Output 2065 ml   Net 448.29 ml        03/24 0701 - 03/25 0700  In: 2785.3 [I.V.:1340.3]  Out: 2865 [Urine:2365]    Physical Exam:        GENERAL: WD AAF NAD  NEURO: Sedate, nonverbal  PSYCHIATRIC: Unable to evaluate   EYE: PE, no icterus, no conjunctivitis  ENT: + NG  NECK: Supple , No JVD discernable,  Trachea midline  CV: No edema, RRR  LUNGS:  Quiet,  Nonlabored resp.  Symmetrical expansion  ABDOMEN: Nondistended, soft nontender.  + Rectal tube  : + Story, no palp bladder  SKIN: Warm and dry without rash      Labs:  Results from last 7 days   Lab Units 03/25/24  0027 03/24/24  0420 03/23/24  0421   WBC 10*3/mm3 11.01* 11.84* 11.48*   HEMOGLOBIN g/dL 7.8* 8.3* 8.2*   PLATELETS 10*3/mm3 463* 498* 471*     Results from last 7 days   Lab Units 03/25/24  0027 03/24/24  0420 03/23/24  1330 03/23/24  0420 03/22/24  1753 03/22/24  0424 03/21/24  0449   SODIUM mmol/L 145 149*  --  145  --  147* 148*   POTASSIUM mmol/L 3.9 4.5  --  3.9 4.5 3.4* 3.6   CHLORIDE mmol/L 112* 114*  --  111*  --  115* 114*   CO2 mmol/L 27.0 28.0  --  28.0  --  24.0 27.0   BUN mg/dL 40* 42*  --  38*  --  38* 48*   CREATININE mg/dL 1.25 1.39*  --  1.15  --  1.15 1.44*   CALCIUM mg/dL 7.6* 8.1*  --  7.9*  --  7.6* 7.8*   PHOSPHORUS mg/dL 3.4 3.2 2.8 2.1* 2.5 1.9* 2.3*   MAGNESIUM mg/dL 1.9  --   --  2.1  --  2.0 2.2   ALBUMIN g/dL 2.4* 2.5*  --   --   --   --   --      Results from last 7 days   Lab Units 03/25/24  0027   ALK PHOS U/L 333*   BILIRUBIN mg/dL <0.2   ALT (SGPT) U/L 380* "   AST (SGOT) U/L 276*     Results from last 7 days   Lab Units 03/24/24  1725   PH, ARTERIAL pH units 7.414   PO2 ART mm Hg 81.7*   PCO2, ARTERIAL mm Hg 44.6   HCO3 ART mmol/L 28.5*               Estimated Creatinine Clearance: 76.5 mL/min (by C-G formula based on SCr of 1.25 mg/dL).         A/P:    ARF: Resolved.    CKD 3: Creatinine 1.3.  Baseline creatinine previously 1.6-1.8.  For now continue supportive care.  Monitor renal function closely.    HTN: Blood pressure stable.  Monitor for now.    Hyponatremia: Sodium improved down to 145.  Continue free water replacement.  Monitor closely.    Contraction metabolic alkalosis: Bicarb levels improved.  Monitor for now.    Respiratory failure: Extubated.  Thought due to ESBL Klebsiella pneumonia.    High risk and complexity patient.  Will follow along closely.      Jg Lobo MD  03/25/24  12:35 EDT

## 2024-03-26 LAB
ANION GAP SERPL CALCULATED.3IONS-SCNC: 6 MMOL/L (ref 5–15)
BASOPHILS # BLD AUTO: 0.02 10*3/MM3 (ref 0–0.2)
BASOPHILS NFR BLD AUTO: 0.2 % (ref 0–1.5)
BUN SERPL-MCNC: 42 MG/DL (ref 8–23)
BUN/CREAT SERPL: 37.2 (ref 7–25)
CALCIUM SPEC-SCNC: 8.3 MG/DL (ref 8.6–10.5)
CHLORIDE SERPL-SCNC: 105 MMOL/L (ref 98–107)
CO2 SERPL-SCNC: 29 MMOL/L (ref 22–29)
CREAT SERPL-MCNC: 1.13 MG/DL (ref 0.76–1.27)
DEPRECATED RDW RBC AUTO: 51.7 FL (ref 37–54)
EGFRCR SERPLBLD CKD-EPI 2021: 71.7 ML/MIN/1.73
EOSINOPHIL # BLD AUTO: 0.25 10*3/MM3 (ref 0–0.4)
EOSINOPHIL NFR BLD AUTO: 3.1 % (ref 0.3–6.2)
ERYTHROCYTE [DISTWIDTH] IN BLOOD BY AUTOMATED COUNT: 15.6 % (ref 12.3–15.4)
GLUCOSE BLDC GLUCOMTR-MCNC: 123 MG/DL (ref 70–130)
GLUCOSE BLDC GLUCOMTR-MCNC: 173 MG/DL (ref 70–130)
GLUCOSE BLDC GLUCOMTR-MCNC: 217 MG/DL (ref 70–130)
GLUCOSE BLDC GLUCOMTR-MCNC: 241 MG/DL (ref 70–130)
GLUCOSE BLDC GLUCOMTR-MCNC: 65 MG/DL (ref 70–130)
GLUCOSE BLDC GLUCOMTR-MCNC: 66 MG/DL (ref 70–130)
GLUCOSE BLDC GLUCOMTR-MCNC: 68 MG/DL (ref 70–130)
GLUCOSE SERPL-MCNC: 57 MG/DL (ref 65–99)
HCT VFR BLD AUTO: 24.3 % (ref 37.5–51)
HGB BLD-MCNC: 7.5 G/DL (ref 13–17.7)
IMM GRANULOCYTES # BLD AUTO: 0.05 10*3/MM3 (ref 0–0.05)
IMM GRANULOCYTES NFR BLD AUTO: 0.6 % (ref 0–0.5)
LYMPHOCYTES # BLD AUTO: 1.44 10*3/MM3 (ref 0.7–3.1)
LYMPHOCYTES NFR BLD AUTO: 17.9 % (ref 19.6–45.3)
MCH RBC QN AUTO: 28.1 PG (ref 26.6–33)
MCHC RBC AUTO-ENTMCNC: 30.9 G/DL (ref 31.5–35.7)
MCV RBC AUTO: 91 FL (ref 79–97)
MONOCYTES # BLD AUTO: 0.65 10*3/MM3 (ref 0.1–0.9)
MONOCYTES NFR BLD AUTO: 8.1 % (ref 5–12)
NEUTROPHILS NFR BLD AUTO: 5.65 10*3/MM3 (ref 1.7–7)
NEUTROPHILS NFR BLD AUTO: 70.1 % (ref 42.7–76)
NRBC BLD AUTO-RTO: 0 /100 WBC (ref 0–0.2)
PLATELET # BLD AUTO: 479 10*3/MM3 (ref 140–450)
PMV BLD AUTO: 11.8 FL (ref 6–12)
POTASSIUM SERPL-SCNC: 3.6 MMOL/L (ref 3.5–5.2)
POTASSIUM SERPL-SCNC: 4.2 MMOL/L (ref 3.5–5.2)
RBC # BLD AUTO: 2.67 10*6/MM3 (ref 4.14–5.8)
SODIUM SERPL-SCNC: 140 MMOL/L (ref 136–145)
WBC NRBC COR # BLD AUTO: 8.06 10*3/MM3 (ref 3.4–10.8)

## 2024-03-26 PROCEDURE — 94799 UNLISTED PULMONARY SVC/PX: CPT

## 2024-03-26 PROCEDURE — 82948 REAGENT STRIP/BLOOD GLUCOSE: CPT

## 2024-03-26 PROCEDURE — 63710000001 INSULIN REGULAR HUMAN PER 5 UNITS: Performed by: INTERNAL MEDICINE

## 2024-03-26 PROCEDURE — 80048 BASIC METABOLIC PNL TOTAL CA: CPT | Performed by: INTERNAL MEDICINE

## 2024-03-26 PROCEDURE — 99232 SBSQ HOSP IP/OBS MODERATE 35: CPT | Performed by: INTERNAL MEDICINE

## 2024-03-26 PROCEDURE — 63710000001 INSULIN DETEMIR PER 5 UNITS: Performed by: INTERNAL MEDICINE

## 2024-03-26 PROCEDURE — 97110 THERAPEUTIC EXERCISES: CPT

## 2024-03-26 PROCEDURE — 84132 ASSAY OF SERUM POTASSIUM: CPT | Performed by: INTERNAL MEDICINE

## 2024-03-26 PROCEDURE — 25010000002 HEPARIN (PORCINE) PER 1000 UNITS: Performed by: INTERNAL MEDICINE

## 2024-03-26 PROCEDURE — 94664 DEMO&/EVAL PT USE INHALER: CPT

## 2024-03-26 PROCEDURE — 85025 COMPLETE CBC W/AUTO DIFF WBC: CPT | Performed by: INTERNAL MEDICINE

## 2024-03-26 RX ORDER — POTASSIUM CHLORIDE 1.5 G/1.58G
40 POWDER, FOR SOLUTION ORAL EVERY 4 HOURS
Qty: 4 PACKET | Refills: 0 | Status: COMPLETED | OUTPATIENT
Start: 2024-03-26 | End: 2024-03-26

## 2024-03-26 RX ADMIN — CARVEDILOL 12.5 MG: 12.5 TABLET, FILM COATED ORAL at 08:08

## 2024-03-26 RX ADMIN — TIMOLOL MALEATE 1 DROP: 5 SOLUTION/ DROPS OPHTHALMIC at 20:45

## 2024-03-26 RX ADMIN — ALBUTEROL SULFATE 2.5 MG: 2.5 SOLUTION RESPIRATORY (INHALATION) at 06:50

## 2024-03-26 RX ADMIN — HYDRALAZINE HYDROCHLORIDE 100 MG: 50 TABLET ORAL at 14:14

## 2024-03-26 RX ADMIN — DEXTROSE MONOHYDRATE 25 G: 25 INJECTION, SOLUTION INTRAVENOUS at 05:37

## 2024-03-26 RX ADMIN — ALBUTEROL SULFATE 2.5 MG: 2.5 SOLUTION RESPIRATORY (INHALATION) at 12:21

## 2024-03-26 RX ADMIN — HUMAN INSULIN 2 UNITS: 100 INJECTION, SOLUTION SUBCUTANEOUS at 12:17

## 2024-03-26 RX ADMIN — CLONIDINE HYDROCHLORIDE 0.2 MG: 0.2 TABLET ORAL at 14:14

## 2024-03-26 RX ADMIN — TERAZOSIN HYDROCHLORIDE ANHYDROUS 5 MG: 5 CAPSULE ORAL at 08:08

## 2024-03-26 RX ADMIN — Medication 30 ML: at 08:09

## 2024-03-26 RX ADMIN — GUAIFENESIN 400 MG: 200 SOLUTION ORAL at 00:38

## 2024-03-26 RX ADMIN — HEPARIN SODIUM 5000 UNITS: 5000 INJECTION INTRAVENOUS; SUBCUTANEOUS at 14:14

## 2024-03-26 RX ADMIN — ACETYLCYSTEINE 4 ML: 200 SOLUTION ORAL; RESPIRATORY (INHALATION) at 03:50

## 2024-03-26 RX ADMIN — CARVEDILOL 12.5 MG: 12.5 TABLET, FILM COATED ORAL at 20:46

## 2024-03-26 RX ADMIN — AMLODIPINE BESYLATE 10 MG: 10 TABLET ORAL at 08:08

## 2024-03-26 RX ADMIN — FLUOXETINE 20 MG: 20 CAPSULE ORAL at 22:20

## 2024-03-26 RX ADMIN — GUAIFENESIN 400 MG: 200 SOLUTION ORAL at 02:46

## 2024-03-26 RX ADMIN — HUMAN INSULIN 6 UNITS: 100 INJECTION, SOLUTION SUBCUTANEOUS at 00:38

## 2024-03-26 RX ADMIN — ACETYLCYSTEINE 4 ML: 200 SOLUTION ORAL; RESPIRATORY (INHALATION) at 06:50

## 2024-03-26 RX ADMIN — TERAZOSIN HYDROCHLORIDE ANHYDROUS 5 MG: 5 CAPSULE ORAL at 20:46

## 2024-03-26 RX ADMIN — GUAIFENESIN 400 MG: 200 SOLUTION ORAL at 20:45

## 2024-03-26 RX ADMIN — FLUOXETINE 20 MG: 20 CAPSULE ORAL at 00:38

## 2024-03-26 RX ADMIN — GUAIFENESIN 400 MG: 200 SOLUTION ORAL at 06:33

## 2024-03-26 RX ADMIN — BRIMONIDINE TARTRATE 1 DROP: 2 SOLUTION/ DROPS OPHTHALMIC at 20:45

## 2024-03-26 RX ADMIN — FLUOXETINE 20 MG: 20 CAPSULE ORAL at 12:16

## 2024-03-26 RX ADMIN — GUAIFENESIN 400 MG: 200 SOLUTION ORAL at 16:49

## 2024-03-26 RX ADMIN — HEPARIN SODIUM 5000 UNITS: 5000 INJECTION INTRAVENOUS; SUBCUTANEOUS at 22:19

## 2024-03-26 RX ADMIN — HUMAN INSULIN 6 UNITS: 100 INJECTION, SOLUTION SUBCUTANEOUS at 12:17

## 2024-03-26 RX ADMIN — LANSOPRAZOLE 30 MG: 15 TABLET, ORALLY DISINTEGRATING ORAL at 05:36

## 2024-03-26 RX ADMIN — ALBUTEROL SULFATE 2.5 MG: 2.5 SOLUTION RESPIRATORY (INHALATION) at 01:04

## 2024-03-26 RX ADMIN — INSULIN DETEMIR 8 UNITS: 100 INJECTION, SOLUTION SUBCUTANEOUS at 08:07

## 2024-03-26 RX ADMIN — CLONIDINE HYDROCHLORIDE 0.2 MG: 0.2 TABLET ORAL at 05:36

## 2024-03-26 RX ADMIN — GUAIFENESIN 400 MG: 200 SOLUTION ORAL at 12:16

## 2024-03-26 RX ADMIN — POTASSIUM CHLORIDE 40 MEQ: 1.5 POWDER, FOR SOLUTION ORAL at 12:16

## 2024-03-26 RX ADMIN — GUAIFENESIN 400 MG: 200 SOLUTION ORAL at 22:20

## 2024-03-26 RX ADMIN — HYDRALAZINE HYDROCHLORIDE 100 MG: 50 TABLET ORAL at 05:36

## 2024-03-26 RX ADMIN — HEPARIN SODIUM 5000 UNITS: 5000 INJECTION INTRAVENOUS; SUBCUTANEOUS at 05:36

## 2024-03-26 RX ADMIN — TIMOLOL MALEATE 1 DROP: 5 SOLUTION/ DROPS OPHTHALMIC at 08:07

## 2024-03-26 RX ADMIN — POTASSIUM CHLORIDE 40 MEQ: 1.5 POWDER, FOR SOLUTION ORAL at 08:07

## 2024-03-26 RX ADMIN — INSULIN DETEMIR 8 UNITS: 100 INJECTION, SOLUTION SUBCUTANEOUS at 20:46

## 2024-03-26 RX ADMIN — ARIPIPRAZOLE 5 MG: 10 TABLET ORAL at 08:08

## 2024-03-26 RX ADMIN — BRIMONIDINE TARTRATE 1 DROP: 2 SOLUTION/ DROPS OPHTHALMIC at 08:07

## 2024-03-26 RX ADMIN — HUMAN INSULIN 2 UNITS: 100 INJECTION, SOLUTION SUBCUTANEOUS at 00:38

## 2024-03-26 RX ADMIN — HUMAN INSULIN 4 UNITS: 100 INJECTION, SOLUTION SUBCUTANEOUS at 18:35

## 2024-03-26 RX ADMIN — BRIMONIDINE TARTRATE 1 DROP: 2 SOLUTION/ DROPS OPHTHALMIC at 16:49

## 2024-03-26 RX ADMIN — ALBUTEROL SULFATE 2.5 MG: 2.5 SOLUTION RESPIRATORY (INHALATION) at 19:31

## 2024-03-26 RX ADMIN — HUMAN INSULIN 6 UNITS: 100 INJECTION, SOLUTION SUBCUTANEOUS at 18:35

## 2024-03-26 RX ADMIN — TORSEMIDE 5 MG: 10 TABLET ORAL at 08:08

## 2024-03-26 NOTE — PLAN OF CARE
Goal Outcome Evaluation:  Plan of Care Reviewed With: patient        Progress: no change  Outcome Evaluation: Pt limited by increased lethargy and limited command following this date. Pt presenting with significant generalized weakness, balance impairments, coordination impairments, and limited activity tolerance warranting IP PT services to address deficits and facilitate increased independence with mobility. Recommend SNF following d/c.      Anticipated Discharge Disposition (PT): skilled nursing facility

## 2024-03-26 NOTE — CASE MANAGEMENT/SOCIAL WORK
Continued Stay Note  [facility]     Patient Name: Omkar Nava  MRN: [id number]  Today's Date: 3/26/2024    Admit Date: 3/10/2024    Plan: Ongoing   Discharge Plan       Row Name 03/26/24 1132       Plan    Plan Ongoing    Patient/Family in Agreement with Plan yes    Plan Comments Spoke with daughter, Idalia by phone. Idalia would like the patient to go back to [facility]. Patient is currently onm 2L NC but has and NG tube with tube feedings. Patient will need a PEG to return to [facility]. CM will continue to follow.    Final Discharge Disposition Code 04 - intermediate care facility                   Discharge Codes    No documentation.                 Expected Discharge Date and Time       Expected Discharge Date Expected Discharge Time    Mar 27, 2024               Hardik Bang RN

## 2024-03-26 NOTE — PROGRESS NOTES
"   LOS: 16 days    Patient Care Team:  Deann Cao MD as PCP - General (Internal Medicine)    Subjective     Stable overnight.    Objective     Vital Signs:  Blood pressure 112/64, pulse 56, temperature 95.7 °F (35.4 °C), temperature source Bladder, resp. rate 20, height 182.9 cm (72\"), weight 91.2 kg (201 lb 1 oz), SpO2 98%.      Intake/Output Summary (Last 24 hours) at 3/26/2024 1435  Last data filed at 3/26/2024 1200  Gross per 24 hour   Intake 1578.2 ml   Output 2300 ml   Net -721.8 ml        03/25 0701 - 03/26 0700  In: 1931 [I.V.:850]  Out: 2100 [Urine:1550]    Physical Exam:        GENERAL: WD AAF NAD  NEURO: Awake and alert  PSYCHIATRIC: Calm, cooperative  EYE: PE, no icterus, no conjunctivitis  ENT: + NG  NECK: Supple , No JVD discernable,  Trachea midline  CV: No edema, RRR  LUNGS:  Quiet,  Nonlabored resp.  Symmetrical expansion  ABDOMEN: Nondistended, soft nontender.  + Rectal tube  : + Story, no palp bladder  SKIN: Warm and dry without rash      Labs:  Results from last 7 days   Lab Units 03/26/24  0443 03/25/24  0027 03/24/24  0420   WBC 10*3/mm3 8.06 11.01* 11.84*   HEMOGLOBIN g/dL 7.5* 7.8* 8.3*   PLATELETS 10*3/mm3 479* 463* 498*     Results from last 7 days   Lab Units 03/26/24  0443 03/25/24  1524 03/25/24  0027 03/24/24  0420 03/23/24  1330 03/23/24  0420 03/22/24  1753 03/22/24  0424 03/21/24  0449   SODIUM mmol/L 140 141 145 149*  --  145  --  147* 148*   POTASSIUM mmol/L 3.6  --  3.9 4.5  --  3.9   < > 3.4* 3.6   CHLORIDE mmol/L 105  --  112* 114*  --  111*  --  115* 114*   CO2 mmol/L 29.0  --  27.0 28.0  --  28.0  --  24.0 27.0   BUN mg/dL 42*  --  40* 42*  --  38*  --  38* 48*   CREATININE mg/dL 1.13  --  1.25 1.39*  --  1.15  --  1.15 1.44*   CALCIUM mg/dL 8.3*  --  7.6* 8.1*  --  7.9*  --  7.6* 7.8*   PHOSPHORUS mg/dL  --   --  3.4 3.2 2.8 2.1*   < > 1.9* 2.3*   MAGNESIUM mg/dL  --   --  1.9  --   --  2.1  --  2.0 2.2   ALBUMIN g/dL  --   --  2.4* 2.5*  --   --   --   --   --  "    < > = values in this interval not displayed.     Results from last 7 days   Lab Units 03/25/24  0027   ALK PHOS U/L 333*   BILIRUBIN mg/dL <0.2   ALT (SGPT) U/L 380*   AST (SGOT) U/L 276*     Results from last 7 days   Lab Units 03/24/24  1725   PH, ARTERIAL pH units 7.414   PO2 ART mm Hg 81.7*   PCO2, ARTERIAL mm Hg 44.6   HCO3 ART mmol/L 28.5*               Estimated Creatinine Clearance: 82.9 mL/min (by C-G formula based on SCr of 1.13 mg/dL).         A/P:    ARF: Resolved.    CKD 3: Creatinine 1.1.  Baseline creatinine previously 1.6-1.8.  For now continue supportive care.  Monitor renal function closely.    HTN: Blood pressure stable.  Monitor for now.    Hyponatremia: Resolved..  Continue free water replacement as needed.  Monitor closely.    Contraction metabolic alkalosis: Stable.  Monitor for now.    Respiratory failure: Extubated.  Thought due to ESBL Klebsiella pneumonia.    High risk and complexity patient.  Will follow along closely.      Jg Lobo MD  03/26/24  14:35 EDT

## 2024-03-26 NOTE — THERAPY TREATMENT NOTE
Patient Name: Omkar Nava  : 1955    MRN: [id number]                              Today's Date: 3/26/2024       Admit Date: 3/10/2024    Visit Dx:     ICD-10-CM ICD-9-CM   1. Acute respiratory failure with hypoxia  J96.01 518.81   2. History of dementia  Z86.59 V11.8   3. Pneumonia due to infectious organism, unspecified laterality, unspecified part of lung  J18.9 486   4. Hypothermia, initial encounter  T68.XXXA 991.6   5. Anemia, unspecified type  D64.9 285.9   6. Hyperkalemia  E87.5 276.7   7. Encephalopathy  G93.40 348.30   8. Protein-calorie malnutrition, unspecified severity  E46 263.9   9. Iron deficiency anemia, unspecified iron deficiency anemia type  D50.9 280.9   10. 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, without long-term current use of insulin    Orthostatic hypotension    Acute osteomyelitis of left foot    Type 2 diabetes mellitus    Essential hypertension    Psychotic disorder    Neurocognitive disorder    S/P transmetatarsal amputation of foot, left    AMS (altered mental status)    Hypoxia    Abscess of left foot    Anemia of chronic disease    Aspiration pneumonia    Cellulitis of left toe    Cellulitis of lower extremity    Cervical spine pain    Chronic kidney disease    Closed head injury without loss of consciousness    Dementia    Dental cavities    Diarrhea of presumed infectious origin    Displaced fracture of proximal phalanx of left great toe, initial encounter for open fracture    Dysphagia    Erectile dysfunction    Fall    Gas gangrene    Generalized muscle weakness    Hallucinations    Hypertensive heart and chronic kidney disease 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 failure, with stage 1 through stage 4 chronic kidney disease, or unspecified chronic kidney disease    Insomnia due to medical  condition    Iron deficiency anemia    Klebsiella pneumoniae (k. pneumoniae) as the cause of diseases classified elsewhere    Laceration without foreign body, left foot, subsequent encounter    Long term (current) use of insulin    Other acute osteomyelitis, left ankle and foot    Personal history of Methicillin resistant Staphylococcus aureus infection    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    Protein calorie malnutrition    Simple chronic bronchitis    Tobacco use    Type 2 diabetes mellitus with diabetic neuropathy, unspecified    Wound infection, posttraumatic    Type 2 diabetes mellitus with diabetic chronic kidney disease    Encephalopathy    Bilateral pneumonia    Acute kidney injury superimposed on chronic kidney disease    Acute type 1 respiratory failure     Past Medical History:   Diagnosis Date    Anemia     Dementia     Diabetes mellitus     Dysphagia     GERD (gastroesophageal reflux disease)     History of alcohol abuse     History of cocaine use     History of marijuana use     Hypertension     Osteomyelitis     Poor historian     records obtained from nursing home records & his family    Visual impairment      Past Surgical History:   Procedure Laterality Date    AMPUTATION FOOT Left 10/18/2022    Procedure: PARTIAL FIRST RAY AMPUTATION LEFT;  Surgeon: Yeison Petty MD;  Location:  [facility];  Service: Orthopedics;  Laterality: Left;    AMPUTATION FOOT Left 12/5/2022    Procedure: Transmetatarsal of Left Foot;  Surgeon: Yeison Petty MD;  Location:  [facility];  Service: Orthopedics;  Laterality: Left;    ENDOSCOPY N/A 3/14/2024    Procedure: ESOPHAGOGASTRODUODENOSCOPY WITH JEJUNAL TUBE INSERTION AT BEDSIDE;  Surgeon: Brunner, Mark I, MD;  Location:  [facility];  Service: Gastroenterology;  Laterality: N/A;    EYE SURGERY        General Information       Row Name 03/26/24 7261          Physical Therapy Time and Intention    Document Type therapy note (daily note)  -ND     Mode of Treatment physical  therapy  -ND       Row Name 03/26/24 1509          General Information    Patient Profile Reviewed yes  -ND     Existing Precautions/Restrictions fall;oxygen therapy device and L/min;other (see comments)  NG tube. Remote L transmetatarsal amputation.  -ND     Barriers to Rehab medically complex;cognitive status;visual deficit  -ND       Row Name 03/26/24 1509          Cognition    Orientation Status (Cognition) oriented to;person;place;verbal cues/prompts needed for orientation;time  -ND       Row Name 03/26/24 1509          Safety Issues, Functional Mobility    Safety Issues Affecting Function (Mobility) awareness of need for assistance;insight into deficits/self-awareness;safety precaution awareness;safety precautions follow-through/compliance;sequencing abilities  -ND     Impairments Affecting Function (Mobility) balance;cognition;coordination;endurance/activity tolerance;motor control;range of motion (ROM);strength;visual/perceptual  -ND     Cognitive Impairments, Mobility Safety/Performance attention;insight into deficits/self-awareness;problem-solving/reasoning;safety precaution awareness;safety precaution follow-through;sequencing abilities  -ND               User Key  (r) = Recorded By, (t) = Taken By, (c) = Cosigned By      Initials Name Provider Type    Libra Tejeda PT Physical Therapist                   Mobility       Row Name 03/26/24 1511          Bed Mobility    Comment, (Bed Mobility) Pt found and left sitting UIC.  -ND       Row Name 03/26/24 1511          Transfers    Comment, (Transfers) Transfers deferred due safety concerns secondary to increased lethargy this date.  -ND               User Key  (r) = Recorded By, (t) = Taken By, (c) = Cosigned By      Initials Name Provider Type    Libra Tejeda PT Physical Therapist                   Obj/Interventions       Row Name 03/26/24 1512          Motor Skills    Therapeutic Exercise hip;knee;shoulder;ankle  -ND       Row Name  03/26/24 1512          Shoulder (Therapeutic Exercise)    Shoulder (Therapeutic Exercise) AROM (active range of motion)  -ND     Shoulder AROM (Therapeutic Exercise) bilateral;aBduction;aDduction;10 repetitions  -ND       Row Name 03/26/24 1512          Hip (Therapeutic Exercise)    Hip (Therapeutic Exercise) AAROM (active assistive range of motion)  -ND     Hip AAROM (Therapeutic Exercise) bilateral;flexion;extension;aBduction;aDduction;external rotation;internal rotation;10 repetitions  -ND       Row Name 03/26/24 1512          Knee (Therapeutic Exercise)    Knee (Therapeutic Exercise) strengthening exercise  -ND     Knee Strengthening (Therapeutic Exercise) bilateral;heel slides;SLR (straight leg raise);10 repetitions  -ND       Row Name 03/26/24 1512          Ankle (Therapeutic Exercise)    Ankle (Therapeutic Exercise) AROM (active range of motion)  -ND     Ankle AROM (Therapeutic Exercise) bilateral;dorsiflexion;plantarflexion;10 repetitions  -ND       Row Name 03/26/24 1512          Balance    Comment, Balance Unable to assess.  -ND               User Key  (r) = Recorded By, (t) = Taken By, (c) = Cosigned By      Initials Name Provider Type    ND Libra Lugo, PT Physical Therapist                   Goals/Plan    No documentation.                  Clinical Impression       Row Name 03/26/24 1516          Pain    Pretreatment Pain Rating 0/10 - no pain  -ND     Posttreatment Pain Rating 0/10 - no pain  -ND     Pain Intervention(s) Repositioned;Ambulation/increased activity  -ND       Row Name 03/26/24 1516          Plan of Care Review    Plan of Care Reviewed With patient  -ND     Progress no change  -ND     Outcome Evaluation Pt limited by increased lethargy and limited command following this date. Pt presenting with significant generalized weakness, balance impairments, coordination impairments, and limited activity tolerance warranting IP PT services to address deficits and facilitate increased  independence with mobility. Recommend SNF following d/c.  -ND       Row Name 03/26/24 1516          Vital Signs    Pre Systolic BP Rehab 112  -ND     Pre Treatment Diastolic BP 64  -ND     Intra Systolic BP Rehab 109  -ND     Intra Treatment Diastolic BP 46  -ND     Post Systolic BP Rehab 114  -ND     Post Treatment Diastolic BP 57  -ND     Pretreatment Heart Rate (beats/min) 61  -ND     Posttreatment Heart Rate (beats/min) 63  -ND     Pre SpO2 (%) 97  -ND     O2 Delivery Pre Treatment nasal cannula  -ND     O2 Delivery Intra Treatment nasal cannula  -ND     Post SpO2 (%) 98  -ND     O2 Delivery Post Treatment nasal cannula  -ND     Pre Patient Position Sitting  -ND     Intra Patient Position Sitting  -ND     Post Patient Position Sitting  -ND       Row Name 03/26/24 1516          Positioning and Restraints    Pre-Treatment Position sitting in chair/recliner  -ND     Post Treatment Position chair  -ND     In Chair notified nsg;reclined;legs elevated;call light within reach;encouraged to call for assist;exit alarm on;waffle cushion;on mechanical lift sling;waffle boot/both;RUE elevated;LUE elevated;RLE elevated;LLE elevated  -ND               User Key  (r) = Recorded By, (t) = Taken By, (c) = Cosigned By      Initials Name Provider Type    Libra Tejeda, PT Physical Therapist                   Outcome Measures       Row Name 03/26/24 1524 03/26/24 1516       How much help from another person do you currently need...    Turning from your back to your side while in flat bed without using bedrails? --  -ND 1  -ND    Moving from lying on back to sitting on the side of a flat bed without bedrails? --  -ND 1  -ND    Moving to and from a bed to a chair (including a wheelchair)? --  -ND 1  -ND    Standing up from a chair using your arms (e.g., wheelchair, bedside chair)? --  -ND 1  -ND    Climbing 3-5 steps with a railing? --  -ND 1  -ND    To walk in hospital room? --  -ND 1  -ND    AM-PAC 6 Clicks Score (PT) --   -ND 6  -ND    Highest Level of Mobility Goal --  -ND 2 --> Bed activities/dependent transfer  -ND      Row Name 03/26/24 0800          How much help from another person do you currently need...    Turning from your back to your side while in flat bed without using bedrails? 2  -MP     Moving from lying on back to sitting on the side of a flat bed without bedrails? 1  -MP     Moving to and from a bed to a chair (including a wheelchair)? 1  -MP     Standing up from a chair using your arms (e.g., wheelchair, bedside chair)? 1  -MP     Climbing 3-5 steps with a railing? 1  -MP     To walk in hospital room? 1  -MP     AM-PAC 6 Clicks Score (PT) 7  -MP     Highest Level of Mobility Goal 2 --> Bed activities/dependent transfer  -MP       Row Name 03/26/24 1524 03/26/24 1516       Functional Assessment    Outcome Measure Options --  -ND AM-PAC 6 Clicks Basic Mobility (PT)  -ND              User Key  (r) = Recorded By, (t) = Taken By, (c) = Cosigned By      Initials Name Provider Type    Karyn Amanda, RN Registered Nurse    Libra Tejeda, PT Physical Therapist                                 Physical Therapy Education       Title: PT OT SLP Therapies (In Progress)       Topic: Physical Therapy (In Progress)       Point: Mobility training (In Progress)       Learning Progress Summary             Patient Acceptance, E, NR by ND at 3/26/2024 1517    Acceptance, E,TB, NR by ES at 3/25/2024 1043   Family Acceptance, E, NR by RL at 3/16/2024 0108                         Point: Home exercise program (In Progress)       Learning Progress Summary             Patient Acceptance, E, NR by ND at 3/26/2024 1517   Family Acceptance, E, NR by RL at 3/16/2024 0108                         Point: Body mechanics (In Progress)       Learning Progress Summary             Patient Acceptance, E, NR by ND at 3/26/2024 1517    Acceptance, E,TB, NR by ES at 3/25/2024 1043   Family Acceptance, E, NR by RL at 3/16/2024 0108                          Point: Precautions (In Progress)       Learning Progress Summary             Patient Acceptance, E, NR by ND at 3/26/2024 1517    Acceptance, E,TB, NR by ES at 3/25/2024 1043   Family Acceptance, E, NR by RL at 3/16/2024 0108                                         User Key       Initials Effective Dates Name Provider Type Discipline    RL 06/16/21 -  Beth Land RN Registered Nurse Nurse    ES 08/11/22 -  Emmy Castellanos, KYRA Physical Therapist PT    ND 11/16/23 -  Libra Lugo PT Physical Therapist PT                  PT Recommendation and Plan     Plan of Care Reviewed With: patient  Progress: no change  Outcome Evaluation: Pt limited by increased lethargy and limited command following this date. Pt presenting with significant generalized weakness, balance impairments, coordination impairments, and limited activity tolerance warranting IP PT services to address deficits and facilitate increased independence with mobility. Recommend SNF following d/c.     Time Calculation:         PT Charges       Row Name 03/26/24 1518             Time Calculation    Start Time 1449  -ND      PT Received On 03/26/24  -ND         Timed Charges    85977 - PT Therapeutic Exercise Minutes 17  -ND         Total Minutes    Timed Charges Total Minutes 17  -ND       Total Minutes 17  -ND                User Key  (r) = Recorded By, (t) = Taken By, (c) = Cosigned By      Initials Name Provider Type    ND Libra Lugo PT Physical Therapist                  Therapy Charges for Today       Code Description Service Date Service Provider Modifiers Qty    [id number] HC PT THER PROC EA 15 MIN 3/26/2024 Libra Lugo, PT GP 1            PT G-Codes  Outcome Measure Options: AM-PAC 6 Clicks Basic Mobility (PT)  AM-PAC 6 Clicks Score (PT): 6  AM-PAC 6 Clicks Score (OT): 7  PT Discharge Summary  Anticipated Discharge Disposition (PT): skilled nursing facility    Libra Lugo PT  3/26/2024

## 2024-03-27 ENCOUNTER — APPOINTMENT (OUTPATIENT)
Dept: GENERAL RADIOLOGY | Facility: HOSPITAL | Age: 69
End: 2024-03-27
Payer: MEDICARE

## 2024-03-27 LAB
ANION GAP SERPL CALCULATED.3IONS-SCNC: 7 MMOL/L (ref 5–15)
BASOPHILS # BLD AUTO: 0.03 10*3/MM3 (ref 0–0.2)
BASOPHILS NFR BLD AUTO: 0.4 % (ref 0–1.5)
BUN SERPL-MCNC: 41 MG/DL (ref 8–23)
BUN/CREAT SERPL: 43.2 (ref 7–25)
CALCIUM SPEC-SCNC: 8.2 MG/DL (ref 8.6–10.5)
CHLORIDE SERPL-SCNC: 108 MMOL/L (ref 98–107)
CO2 SERPL-SCNC: 28 MMOL/L (ref 22–29)
CREAT SERPL-MCNC: 0.95 MG/DL (ref 0.76–1.27)
DEPRECATED RDW RBC AUTO: 51.6 FL (ref 37–54)
EGFRCR SERPLBLD CKD-EPI 2021: 88.3 ML/MIN/1.73
EOSINOPHIL # BLD AUTO: 0.3 10*3/MM3 (ref 0–0.4)
EOSINOPHIL NFR BLD AUTO: 3.8 % (ref 0.3–6.2)
ERYTHROCYTE [DISTWIDTH] IN BLOOD BY AUTOMATED COUNT: 15.4 % (ref 12.3–15.4)
GLUCOSE BLDC GLUCOMTR-MCNC: 178 MG/DL (ref 70–130)
GLUCOSE BLDC GLUCOMTR-MCNC: 181 MG/DL (ref 70–130)
GLUCOSE BLDC GLUCOMTR-MCNC: 195 MG/DL (ref 70–130)
GLUCOSE SERPL-MCNC: 162 MG/DL (ref 65–99)
HCT VFR BLD AUTO: 24.4 % (ref 37.5–51)
HGB BLD-MCNC: 7.4 G/DL (ref 13–17.7)
IMM GRANULOCYTES # BLD AUTO: 0.08 10*3/MM3 (ref 0–0.05)
IMM GRANULOCYTES NFR BLD AUTO: 1 % (ref 0–0.5)
LYMPHOCYTES # BLD AUTO: 1.01 10*3/MM3 (ref 0.7–3.1)
LYMPHOCYTES NFR BLD AUTO: 12.9 % (ref 19.6–45.3)
MAGNESIUM SERPL-MCNC: 2.2 MG/DL (ref 1.6–2.4)
MCH RBC QN AUTO: 27.7 PG (ref 26.6–33)
MCHC RBC AUTO-ENTMCNC: 30.3 G/DL (ref 31.5–35.7)
MCV RBC AUTO: 91.4 FL (ref 79–97)
MONOCYTES # BLD AUTO: 0.53 10*3/MM3 (ref 0.1–0.9)
MONOCYTES NFR BLD AUTO: 6.8 % (ref 5–12)
NEUTROPHILS NFR BLD AUTO: 5.85 10*3/MM3 (ref 1.7–7)
NEUTROPHILS NFR BLD AUTO: 75.1 % (ref 42.7–76)
NRBC BLD AUTO-RTO: 0 /100 WBC (ref 0–0.2)
PHOSPHATE SERPL-MCNC: 2.9 MG/DL (ref 2.5–4.5)
PLATELET # BLD AUTO: 494 10*3/MM3 (ref 140–450)
PMV BLD AUTO: 12.2 FL (ref 6–12)
POTASSIUM SERPL-SCNC: 4.4 MMOL/L (ref 3.5–5.2)
PROCALCITONIN SERPL-MCNC: 0.3 NG/ML (ref 0–0.25)
RBC # BLD AUTO: 2.67 10*6/MM3 (ref 4.14–5.8)
SODIUM SERPL-SCNC: 143 MMOL/L (ref 136–145)
WBC NRBC COR # BLD AUTO: 7.8 10*3/MM3 (ref 3.4–10.8)

## 2024-03-27 PROCEDURE — 80048 BASIC METABOLIC PNL TOTAL CA: CPT | Performed by: INTERNAL MEDICINE

## 2024-03-27 PROCEDURE — 63710000001 INSULIN DETEMIR PER 5 UNITS: Performed by: INTERNAL MEDICINE

## 2024-03-27 PROCEDURE — 97110 THERAPEUTIC EXERCISES: CPT

## 2024-03-27 PROCEDURE — 63710000001 INSULIN REGULAR HUMAN PER 5 UNITS: Performed by: INTERNAL MEDICINE

## 2024-03-27 PROCEDURE — 94799 UNLISTED PULMONARY SVC/PX: CPT

## 2024-03-27 PROCEDURE — 94664 DEMO&/EVAL PT USE INHALER: CPT

## 2024-03-27 PROCEDURE — 83735 ASSAY OF MAGNESIUM: CPT | Performed by: INTERNAL MEDICINE

## 2024-03-27 PROCEDURE — 85025 COMPLETE CBC W/AUTO DIFF WBC: CPT | Performed by: INTERNAL MEDICINE

## 2024-03-27 PROCEDURE — 84100 ASSAY OF PHOSPHORUS: CPT | Performed by: INTERNAL MEDICINE

## 2024-03-27 PROCEDURE — 92610 EVALUATE SWALLOWING FUNCTION: CPT

## 2024-03-27 PROCEDURE — 99232 SBSQ HOSP IP/OBS MODERATE 35: CPT | Performed by: INTERNAL MEDICINE

## 2024-03-27 PROCEDURE — 82948 REAGENT STRIP/BLOOD GLUCOSE: CPT

## 2024-03-27 PROCEDURE — 71045 X-RAY EXAM CHEST 1 VIEW: CPT

## 2024-03-27 PROCEDURE — 97530 THERAPEUTIC ACTIVITIES: CPT

## 2024-03-27 PROCEDURE — 25010000002 HEPARIN (PORCINE) PER 1000 UNITS: Performed by: INTERNAL MEDICINE

## 2024-03-27 PROCEDURE — 84145 PROCALCITONIN (PCT): CPT | Performed by: INTERNAL MEDICINE

## 2024-03-27 RX ORDER — FLUOXETINE HYDROCHLORIDE 20 MG/1
20 CAPSULE ORAL EVERY 12 HOURS
Status: DISCONTINUED | OUTPATIENT
Start: 2024-03-27 | End: 2024-04-04 | Stop reason: HOSPADM

## 2024-03-27 RX ADMIN — TIMOLOL MALEATE 1 DROP: 5 SOLUTION/ DROPS OPHTHALMIC at 20:15

## 2024-03-27 RX ADMIN — INSULIN DETEMIR 8 UNITS: 100 INJECTION, SOLUTION SUBCUTANEOUS at 20:15

## 2024-03-27 RX ADMIN — HUMAN INSULIN 6 UNITS: 100 INJECTION, SOLUTION SUBCUTANEOUS at 00:36

## 2024-03-27 RX ADMIN — HYDRALAZINE HYDROCHLORIDE 100 MG: 50 TABLET ORAL at 14:52

## 2024-03-27 RX ADMIN — TERAZOSIN HYDROCHLORIDE ANHYDROUS 5 MG: 5 CAPSULE ORAL at 08:42

## 2024-03-27 RX ADMIN — CLONIDINE HYDROCHLORIDE 0.2 MG: 0.2 TABLET ORAL at 14:52

## 2024-03-27 RX ADMIN — ALBUTEROL SULFATE 2.5 MG: 2.5 SOLUTION RESPIRATORY (INHALATION) at 07:20

## 2024-03-27 RX ADMIN — AMLODIPINE BESYLATE 10 MG: 10 TABLET ORAL at 08:42

## 2024-03-27 RX ADMIN — ARIPIPRAZOLE 5 MG: 10 TABLET ORAL at 08:41

## 2024-03-27 RX ADMIN — CLONIDINE HYDROCHLORIDE 0.2 MG: 0.2 TABLET ORAL at 06:28

## 2024-03-27 RX ADMIN — HUMAN INSULIN 2 UNITS: 100 INJECTION, SOLUTION SUBCUTANEOUS at 18:24

## 2024-03-27 RX ADMIN — GUAIFENESIN 400 MG: 200 SOLUTION ORAL at 14:51

## 2024-03-27 RX ADMIN — HEPARIN SODIUM 5000 UNITS: 5000 INJECTION INTRAVENOUS; SUBCUTANEOUS at 22:05

## 2024-03-27 RX ADMIN — GUAIFENESIN 400 MG: 200 SOLUTION ORAL at 10:56

## 2024-03-27 RX ADMIN — LANSOPRAZOLE 30 MG: 15 TABLET, ORALLY DISINTEGRATING ORAL at 06:28

## 2024-03-27 RX ADMIN — TERAZOSIN HYDROCHLORIDE ANHYDROUS 5 MG: 5 CAPSULE ORAL at 20:15

## 2024-03-27 RX ADMIN — ALBUTEROL SULFATE 2.5 MG: 2.5 SOLUTION RESPIRATORY (INHALATION) at 18:57

## 2024-03-27 RX ADMIN — HUMAN INSULIN 6 UNITS: 100 INJECTION, SOLUTION SUBCUTANEOUS at 12:25

## 2024-03-27 RX ADMIN — HUMAN INSULIN 2 UNITS: 100 INJECTION, SOLUTION SUBCUTANEOUS at 06:33

## 2024-03-27 RX ADMIN — ALBUTEROL SULFATE 2.5 MG: 2.5 SOLUTION RESPIRATORY (INHALATION) at 13:00

## 2024-03-27 RX ADMIN — Medication 30 ML: at 10:56

## 2024-03-27 RX ADMIN — Medication 30 ML: at 22:15

## 2024-03-27 RX ADMIN — HYDRALAZINE HYDROCHLORIDE 100 MG: 50 TABLET ORAL at 06:28

## 2024-03-27 RX ADMIN — FLUOXETINE 20 MG: 20 CAPSULE ORAL at 20:15

## 2024-03-27 RX ADMIN — HYDRALAZINE HYDROCHLORIDE 100 MG: 50 TABLET ORAL at 22:05

## 2024-03-27 RX ADMIN — HEPARIN SODIUM 5000 UNITS: 5000 INJECTION INTRAVENOUS; SUBCUTANEOUS at 14:51

## 2024-03-27 RX ADMIN — CARVEDILOL 12.5 MG: 12.5 TABLET, FILM COATED ORAL at 08:42

## 2024-03-27 RX ADMIN — GUAIFENESIN 400 MG: 200 SOLUTION ORAL at 02:46

## 2024-03-27 RX ADMIN — ALBUTEROL SULFATE 2.5 MG: 2.5 SOLUTION RESPIRATORY (INHALATION) at 01:17

## 2024-03-27 RX ADMIN — BRIMONIDINE TARTRATE 1 DROP: 2 SOLUTION/ DROPS OPHTHALMIC at 08:42

## 2024-03-27 RX ADMIN — CLONIDINE HYDROCHLORIDE 0.2 MG: 0.2 TABLET ORAL at 22:05

## 2024-03-27 RX ADMIN — HUMAN INSULIN 4 UNITS: 100 INJECTION, SOLUTION SUBCUTANEOUS at 00:35

## 2024-03-27 RX ADMIN — HEPARIN SODIUM 5000 UNITS: 5000 INJECTION INTRAVENOUS; SUBCUTANEOUS at 06:28

## 2024-03-27 RX ADMIN — TIMOLOL MALEATE 1 DROP: 5 SOLUTION/ DROPS OPHTHALMIC at 08:42

## 2024-03-27 RX ADMIN — BRIMONIDINE TARTRATE 1 DROP: 2 SOLUTION/ DROPS OPHTHALMIC at 20:15

## 2024-03-27 RX ADMIN — GUAIFENESIN 400 MG: 200 SOLUTION ORAL at 06:28

## 2024-03-27 RX ADMIN — CARVEDILOL 12.5 MG: 12.5 TABLET, FILM COATED ORAL at 20:15

## 2024-03-27 RX ADMIN — TORSEMIDE 5 MG: 10 TABLET ORAL at 08:42

## 2024-03-27 RX ADMIN — HUMAN INSULIN 6 UNITS: 100 INJECTION, SOLUTION SUBCUTANEOUS at 18:24

## 2024-03-27 RX ADMIN — INSULIN DETEMIR 8 UNITS: 100 INJECTION, SOLUTION SUBCUTANEOUS at 08:41

## 2024-03-27 RX ADMIN — HUMAN INSULIN 6 UNITS: 100 INJECTION, SOLUTION SUBCUTANEOUS at 06:28

## 2024-03-27 RX ADMIN — BRIMONIDINE TARTRATE 1 DROP: 2 SOLUTION/ DROPS OPHTHALMIC at 16:38

## 2024-03-27 RX ADMIN — HUMAN INSULIN 2 UNITS: 100 INJECTION, SOLUTION SUBCUTANEOUS at 12:25

## 2024-03-27 NOTE — PLAN OF CARE
Goal Outcome Evaluation:  Plan of Care Reviewed With: patient        Progress: no change  Outcome Evaluation: Session limited by pt increased lethargy and minimal command follow this date. Pt presenting with significant generalized weakness, limited ROM due to stiffness, limited activity tolerance, and impaired balance and coordination warranting continued skilled therapy to address deficits and facilitate increased independence with mobility. Recommend SNF following d/c.      Anticipated Discharge Disposition (PT): skilled nursing facility

## 2024-03-27 NOTE — THERAPY TREATMENT NOTE
Patient Name: Omkar Nava  : 1955    MRN: [id number]                              Today's Date: 3/27/2024       Admit Date: 3/10/2024    Visit Dx:     ICD-10-CM ICD-9-CM   1. Acute respiratory failure with hypoxia  J96.01 518.81   2. History of dementia  Z86.59 V11.8   3. Pneumonia due to infectious organism, unspecified laterality, unspecified part of lung  J18.9 486   4. Hypothermia, initial encounter  T68.XXXA 991.6   5. Anemia, unspecified type  D64.9 285.9   6. Hyperkalemia  E87.5 276.7   7. Encephalopathy  G93.40 348.30   8. Protein-calorie malnutrition, unspecified severity  E46 263.9   9. Iron deficiency anemia, unspecified iron deficiency anemia type  D50.9 280.9   10. 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, without long-term current use of insulin    Orthostatic hypotension    Acute osteomyelitis of left foot    Type 2 diabetes mellitus    Essential hypertension    Psychotic disorder    Neurocognitive disorder    S/P transmetatarsal amputation of foot, left    AMS (altered mental status)    Hypoxia    Abscess of left foot    Anemia of chronic disease    Aspiration pneumonia    Cellulitis of left toe    Cellulitis of lower extremity    Cervical spine pain    Chronic kidney disease    Closed head injury without loss of consciousness    Dementia    Dental cavities    Diarrhea of presumed infectious origin    Displaced fracture of proximal phalanx of left great toe, initial encounter for open fracture    Dysphagia    Erectile dysfunction    Fall    Gas gangrene    Generalized muscle weakness    Hallucinations    Hypertensive heart and chronic kidney disease 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 failure, with stage 1 through stage 4 chronic kidney disease, or unspecified chronic kidney disease    Insomnia due to medical  condition    Iron deficiency anemia    Klebsiella pneumoniae (k. pneumoniae) as the cause of diseases classified elsewhere    Laceration without foreign body, left foot, subsequent encounter    Long term (current) use of insulin    Other acute osteomyelitis, left ankle and foot    Personal history of Methicillin resistant Staphylococcus aureus infection    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    Protein calorie malnutrition    Simple chronic bronchitis    Tobacco use    Type 2 diabetes mellitus with diabetic neuropathy, unspecified    Wound infection, posttraumatic    Type 2 diabetes mellitus with diabetic chronic kidney disease    Encephalopathy    Bilateral pneumonia    Acute kidney injury superimposed on chronic kidney disease    Acute type 1 respiratory failure     Past Medical History:   Diagnosis Date    Anemia     Dementia     Diabetes mellitus     Dysphagia     GERD (gastroesophageal reflux disease)     History of alcohol abuse     History of cocaine use     History of marijuana use     Hypertension     Osteomyelitis     Poor historian     records obtained from nursing home records & his family    Visual impairment      Past Surgical History:   Procedure Laterality Date    AMPUTATION FOOT Left 10/18/2022    Procedure: PARTIAL FIRST RAY AMPUTATION LEFT;  Surgeon: Yeison Petty MD;  Location:  [facility] OR;  Service: Orthopedics;  Laterality: Left;    AMPUTATION FOOT Left 12/5/2022    Procedure: Transmetatarsal of Left Foot;  Surgeon: Yeison Petty MD;  Location:  [facility] OR;  Service: Orthopedics;  Laterality: Left;    ENDOSCOPY N/A 3/14/2024    Procedure: ESOPHAGOGASTRODUODENOSCOPY WITH JEJUNAL TUBE INSERTION AT BEDSIDE;  Surgeon: Brunner, Mark I, MD;  Location:  [facility];  Service: Gastroenterology;  Laterality: N/A;    EYE SURGERY        General Information       Row Name 03/27/24 1525          OT Time and Intention    Document Type therapy note (daily note)  -KF     Mode of Treatment occupational therapy  -KF        Row Name 03/27/24 1525          General Information    Patient Profile Reviewed yes  -KF     Existing Precautions/Restrictions fall;oxygen therapy device and L/min;other (see comments)  NG tube. Remote L transmetatarsal amputation.  -KF     Barriers to Rehab medically complex;cognitive status;visual deficit  -KF       Row Name 03/27/24 1525          Cognition    Orientation Status (Cognition) oriented to;person;unable/difficult to assess;other (see comments)  Pt very lethargic this session, able to open eyes to name for a few seconds.  -KF       Row Name 03/27/24 1525          Safety Issues, Functional Mobility    Safety Issues Affecting Function (Mobility) awareness of need for assistance;ability to follow commands;insight into deficits/self-awareness;safety precaution awareness;safety precautions follow-through/compliance;sequencing abilities  -KF     Impairments Affecting Function (Mobility) balance;cognition;coordination;endurance/activity tolerance;motor control;range of motion (ROM);strength;visual/perceptual;postural/trunk control  -KF     Cognitive Impairments, Mobility Safety/Performance attention;awareness, need for assistance;insight into deficits/self-awareness;problem-solving/reasoning;judgment;safety precaution awareness;safety precaution follow-through;sequencing abilities  -KF               User Key  (r) = Recorded By, (t) = Taken By, (c) = Cosigned By      Initials Name Provider Type    KF Natalie Zambrano OT Occupational Therapist                     Mobility/ADL's       Row Name 03/27/24 1527          Bed Mobility    Bed Mobility rolling right;supine-sit;scooting/bridging  -KF     Rolling Right [place] (Bed Mobility) dependent (less than 25% patient effort);2 person assist;verbal cues;nonverbal cues (demo/gesture)  -KF     Scooting/Bridging [place] (Bed Mobility) dependent (less than 25% patient effort);2 person assist;verbal cues;nonverbal cues (demo/gesture)  -KF     Supine-Sit  [place] (Bed Mobility) dependent (less than 25% patient effort);2 person assist;verbal cues;nonverbal cues (demo/gesture)  -     Bed Mobility, Safety Issues cognitive deficits limit understanding;decreased use of arms for pushing/pulling;decreased use of legs for bridging/pushing;impaired trunk control for bed mobility  -     Assistive Device (Bed Mobility) draw sheet;bed rails;head of bed elevated  -     Comment, (Bed Mobility) Attempted supine to sit transfer with depA x2, however pt unable to sit fully upright off bed due to weakness/stiffness/lethargy. Pt assisted back to supine due to safety concerns and signficant assistance required.  -       Row Name 03/27/24 1527          Transfers    Comment, (Transfers) Unable to attempt transfers this date due to safety concerns from increased lethargy and weakness this date  -       Row Name 03/27/24 1527          Activities of Daily Living    BADL Assessment/Intervention grooming  -       Row Name 03/27/24 1527          Grooming Assessment/Training    [place] Level (Grooming) wash face, hands;maximum assist (25% patient effort)  -     Assistive Devices (Grooming) hand over hand  -     Position (Grooming) sitting up in bed  -     Comment, (Grooming) Facial hygiene completed with max hand over hand assistance, near dependence.  -               User Key  (r) = Recorded By, (t) = Taken By, (c) = Cosigned By      Initials Name Provider Type    KF Natalie Zambrano OT Occupational Therapist                   Obj/Interventions       Row Name 03/27/24 1530          Shoulder (Therapeutic Exercise)    Shoulder (Therapeutic Exercise) AAROM (active assistive range of motion)  -     Shoulder AAROM (Therapeutic Exercise) bilateral;flexion;extension;supine;10 repetitions  -       Row Name 03/27/24 1530          Elbow/Forearm (Therapeutic Exercise)    Elbow/Forearm (Therapeutic Exercise) AAROM (active assistive range of motion)  -     Elbow/Forearm  AAROM (Therapeutic Exercise) bilateral;flexion;extension;supine;10 second hold  -[facility] Name 03/27/24 1530          Wrist (Therapeutic Exercise)    Wrist (Therapeutic Exercise) AAROM (active assistive range of motion)  -KF     Wrist AAROM (Therapeutic Exercise) bilateral;flexion;extension;10 repetitions  -[facility] Name 03/27/24 1530          Hand (Therapeutic Exercise)    Hand (Therapeutic Exercise) AROM (active range of motion)  -KF     Hand AROM/AAROM (Therapeutic Exercise) AAROM (active assistive range of motion);bilateral;finger flexion;finger extension;10 repetitions;intrinsics;extrinsics;5 second hold  -[facility] Name 03/27/24 1530          Motor Skills    Therapeutic Exercise shoulder;elbow/forearm;wrist;hand  -[facility] Name 03/27/24 1530          Balance    Balance Assessment sitting static balance  -KF     Static Sitting Balance dependent;2-person assist  -KF     Position, Sitting Balance supported;sitting edge of bed;other (see comments)  Pt unable to safety achieve full sitting EOB positioning.  -KF     Balance Interventions sitting;supported;static  -KF               User Key  (r) = Recorded By, (t) = Taken By, (c) = Cosigned By      Initials Name Provider Type    KF Natalie Zambrano OT Occupational Therapist                   Goals/Plan    No documentation.                  Clinical Impression       [facility] Name 03/27/24 1531          Pain Assessment    Pain Intervention(s) Repositioned;Ambulation/increased activity  -     Additional Documentation Pain Scale: FACES Pre/Post-Treatment (Group)  -[facility] Name 03/27/24 1531          Pain Scale: FACES Pre/Post-Treatment    Pain: FACES Scale, Pretreatment 0-->no hurt  -     Posttreatment Pain Rating 0-->no hurt  -[facility] Name 03/27/24 1531          Plan of Care Review    Plan of Care Reviewed With patient  -     Progress no change  -     Outcome Evaluation Pt remains below his functional baseline with cognitive deficits and  lethargy, generalized weakness, decreased activity tolerance, and balance deficits warranting continued IP OT services. Supine to sit transfer was attempted dependently, however pt unable to safely achieve upright posture. Facial hygiene completed with max hand over hand assist, near dependence. Pt limited this date by lethargy and weakness. Continue to recommend a d/c to SNF for best outcome.  -       Row Name 03/27/24 1531          Therapy Assessment/Plan (OT)    Rehab Potential (OT) fair, will monitor progress closely  -KF     Criteria for Skilled Therapeutic Interventions Met (OT) yes;skilled treatment is necessary  -KF     Therapy Frequency (OT) daily  -KF       Row Name 03/27/24 1531          Therapy Plan Review/Discharge Plan (OT)    Anticipated Discharge Disposition (OT) skilled nursing facility  -       Row Name 03/27/24 1531          Vital Signs    Pre Systolic BP Rehab 135  -KF     Pre Treatment Diastolic BP 62  -KF     Post Systolic BP Rehab 125  -KF     Post Treatment Diastolic BP 49  -KF     Pretreatment Heart Rate (beats/min) 73  -KF     Posttreatment Heart Rate (beats/min) 67  -KF     Pre SpO2 (%) 95  2L  -KF     O2 Delivery Pre Treatment nasal cannula  -KF     Post SpO2 (%) 95  -KF     O2 Delivery Post Treatment nasal cannula  -KF     Pre Patient Position Supine  -KF     Intra Patient Position Sitting  -KF     Post Patient Position Supine  -KF       Row Name 03/27/24 1531          Positioning and Restraints    Pre-Treatment Position in bed  -KF     Post Treatment Position bed  -KF     In Bed notified nsg;supine;call light within reach;encouraged to call for assist;exit alarm on;RUE elevated;LUE elevated;SCD pump applied  -KF               User Key  (r) = Recorded By, (t) = Taken By, (c) = Cosigned By      Initials Name Provider Type    Natalie Menjivar, OT Occupational Therapist                   Outcome Measures       Row Name 03/27/24 1533          How much help from another is currently  needed...    Putting on and taking off regular lower body clothing? 1  -KF     Bathing (including washing, rinsing, and drying) 1  -KF     Toileting (which includes using toilet bed pan or urinal) 1  -KF     Putting on and taking off regular upper body clothing 1  -KF     Taking care of personal grooming (such as brushing teeth) 1  -KF     Eating meals 1  -KF     AM-PAC 6 Clicks Score (OT) 6  -KF       Row Name 03/27/24 0800          How much help from another person do you currently need...    Turning from your back to your side while in flat bed without using bedrails? 1  -MP     Moving from lying on back to sitting on the side of a flat bed without bedrails? 1  -MP     Moving to and from a bed to a chair (including a wheelchair)? 1  -MP     Standing up from a chair using your arms (e.g., wheelchair, bedside chair)? 1  -MP     Climbing 3-5 steps with a railing? 1  -MP     To walk in hospital room? 1  -MP     AM-PAC 6 Clicks Score (PT) 6  -MP     Highest Level of Mobility Goal 2 --> Bed activities/dependent transfer  -MP       Row Name 03/27/24 1534          Functional Assessment    Outcome Measure Options AM-PAC 6 Clicks Daily Activity (OT)  -KF               User Key  (r) = Recorded By, (t) = Taken By, (c) = Cosigned By      Initials Name Provider Type    Karyn Amanda, RN Registered Nurse    Natalie Menjivar OT Occupational Therapist                    Occupational Therapy Education       Title: PT OT SLP Therapies (In Progress)       Topic: Occupational Therapy (In Progress)       Point: ADL training (In Progress)       Description:   Instruct learner(s) on proper safety adaptation and remediation techniques during self care or transfers.   Instruct in proper use of assistive devices.                  Learning Progress Summary             Patient Acceptance, E,TB, NR by KF at 3/27/2024 1457    Acceptance, E, NR by CS at 3/25/2024 1139   Family Acceptance, E, NR by RL at 3/16/2024 0108                          Point: Home exercise program (In Progress)       Description:   Instruct learner(s) on appropriate technique for monitoring, assisting and/or progressing therapeutic exercises/activities.                  Learning Progress Summary             Patient Acceptance, E,TB, NR by KF at 3/27/2024 1457   Family Acceptance, E, NR by RL at 3/16/2024 0108                         Point: Precautions (In Progress)       Description:   Instruct learner(s) on prescribed precautions during self-care and functional transfers.                  Learning Progress Summary             Patient Acceptance, E,TB, NR by KF at 3/27/2024 1457    Acceptance, E, NR by CS at 3/25/2024 1139   Family Acceptance, E, NR by RL at 3/16/2024 0108                         Point: Body mechanics (In Progress)       Description:   Instruct learner(s) on proper positioning and spine alignment during self-care, functional mobility activities and/or exercises.                  Learning Progress Summary             Patient Acceptance, E,TB, NR by KF at 3/27/2024 1457    Acceptance, E, NR by CS at 3/25/2024 1139   Family Acceptance, E, NR by RL at 3/16/2024 0108                                         User Key       Initials Effective Dates Name Provider Type Discipline     06/16/21 -  Beth Land RN Registered Nurse Nurse     09/02/21 -  Sarah Jade OT Occupational Therapist OT     08/09/23 -  Natalie Zambrano OT Occupational Therapist OT                  OT Recommendation and Plan  Therapy Frequency (OT): daily  Plan of Care Review  Plan of Care Reviewed With: patient  Progress: no change  Outcome Evaluation: Pt remains below his functional baseline with cognitive deficits and lethargy, generalized weakness, decreased activity tolerance, and balance deficits warranting continued IP OT services. Supine to sit transfer was attempted dependently, however pt unable to safely achieve upright posture. Facial hygiene completed with max hand over  hand assist, near dependence. Pt limited this date by lethargy and weakness. Continue to recommend a d/c to SNF for best outcome.     Time Calculation:         Time Calculation- OT       Row Name 03/27/24 1534             Time Calculation- OT    OT Start Time 1457  -KF      OT Received On 03/27/24  -KF      OT Goal Re-Cert Due Date 04/04/24  -KF         Timed Charges    71772 - OT Therapeutic Exercise Minutes 8  -KF      66098 - OT Therapeutic Activity Minutes 3  -KF      73578 - OT Self Care/Mgmt Minutes 4  -KF         Total Minutes    Timed Charges Total Minutes 15  -KF       Total Minutes 15  -KF                User Key  (r) = Recorded By, (t) = Taken By, (c) = Cosigned By      Initials Name Provider Type    KF Natalie Zambrano OT Occupational Therapist                  Therapy Charges for Today       Code Description Service Date Service Provider Modifiers Qty    [id number]  OT THER PROC EA 15 MIN 3/27/2024 Natalie Zambrano OT GO 1                 Natalie Zambrano OT  3/27/2024

## 2024-03-27 NOTE — THERAPY TREATMENT NOTE
Patient Name: Omkar Nava  : 1955    MRN: [id number]                              Today's Date: 3/27/2024       Admit Date: 3/10/2024    Visit Dx:     ICD-10-CM ICD-9-CM   1. Acute respiratory failure with hypoxia  J96.01 518.81   2. History of dementia  Z86.59 V11.8   3. Pneumonia due to infectious organism, unspecified laterality, unspecified part of lung  J18.9 486   4. Hypothermia, initial encounter  T68.XXXA 991.6   5. Anemia, unspecified type  D64.9 285.9   6. Hyperkalemia  E87.5 276.7   7. Encephalopathy  G93.40 348.30   8. Protein-calorie malnutrition, unspecified severity  E46 263.9   9. Iron deficiency anemia, unspecified iron deficiency anemia type  D50.9 280.9   10. 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, without long-term current use of insulin    Orthostatic hypotension    Acute osteomyelitis of left foot    Type 2 diabetes mellitus    Essential hypertension    Psychotic disorder    Neurocognitive disorder    S/P transmetatarsal amputation of foot, left    AMS (altered mental status)    Hypoxia    Abscess of left foot    Anemia of chronic disease    Aspiration pneumonia    Cellulitis of left toe    Cellulitis of lower extremity    Cervical spine pain    Chronic kidney disease    Closed head injury without loss of consciousness    Dementia    Dental cavities    Diarrhea of presumed infectious origin    Displaced fracture of proximal phalanx of left great toe, initial encounter for open fracture    Dysphagia    Erectile dysfunction    Fall    Gas gangrene    Generalized muscle weakness    Hallucinations    Hypertensive heart and chronic kidney disease 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 failure, with stage 1 through stage 4 chronic kidney disease, or unspecified chronic kidney disease    Insomnia due to medical  condition    Iron deficiency anemia    Klebsiella pneumoniae (k. pneumoniae) as the cause of diseases classified elsewhere    Laceration without foreign body, left foot, subsequent encounter    Long term (current) use of insulin    Other acute osteomyelitis, left ankle and foot    Personal history of Methicillin resistant Staphylococcus aureus infection    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    Protein calorie malnutrition    Simple chronic bronchitis    Tobacco use    Type 2 diabetes mellitus with diabetic neuropathy, unspecified    Wound infection, posttraumatic    Type 2 diabetes mellitus with diabetic chronic kidney disease    Encephalopathy    Bilateral pneumonia    Acute kidney injury superimposed on chronic kidney disease    Acute type 1 respiratory failure     Past Medical History:   Diagnosis Date    Anemia     Dementia     Diabetes mellitus     Dysphagia     GERD (gastroesophageal reflux disease)     History of alcohol abuse     History of cocaine use     History of marijuana use     Hypertension     Osteomyelitis     Poor historian     records obtained from nursing home records & his family    Visual impairment      Past Surgical History:   Procedure Laterality Date    AMPUTATION FOOT Left 10/18/2022    Procedure: PARTIAL FIRST RAY AMPUTATION LEFT;  Surgeon: Yeison Petty MD;  Location:  [facility];  Service: Orthopedics;  Laterality: Left;    AMPUTATION FOOT Left 12/5/2022    Procedure: Transmetatarsal of Left Foot;  Surgeon: Yeison Petty MD;  Location:  [facility];  Service: Orthopedics;  Laterality: Left;    ENDOSCOPY N/A 3/14/2024    Procedure: ESOPHAGOGASTRODUODENOSCOPY WITH JEJUNAL TUBE INSERTION AT BEDSIDE;  Surgeon: Brunner, Mark I, MD;  Location:  [facility];  Service: Gastroenterology;  Laterality: N/A;    EYE SURGERY        General Information       Row Name 03/27/24 1538          Physical Therapy Time and Intention    Document Type therapy note (daily note)  -ND     Mode of Treatment physical  therapy  -ND       Row Name 03/27/24 1538          General Information    Patient Profile Reviewed yes  -ND     Existing Precautions/Restrictions fall;oxygen therapy device and L/min;other (see comments)  NG. Remote L transmetatarsal amputation.  -ND     Barriers to Rehab medically complex;cognitive status;visual deficit  -ND       Row Name 03/27/24 1538          Cognition    Orientation Status (Cognition) unable/difficult to assess;other (see comments)  Pt with increased lethargy but opens eyes in response to calling pt name.  -ND       Row Name 03/27/24 1538          Safety Issues, Functional Mobility    Safety Issues Affecting Function (Mobility) ability to follow commands;awareness of need for assistance;friction/shear risk;insight into deficits/self-awareness;judgment;safety precaution awareness;safety precautions follow-through/compliance;sequencing abilities  -ND     Impairments Affecting Function (Mobility) balance;cognition;coordination;endurance/activity tolerance;motor control;pain;postural/trunk control;range of motion (ROM);visual/perceptual;strength  -ND     Cognitive Impairments, Mobility Safety/Performance attention;awareness, need for assistance;insight into deficits/self-awareness;problem-solving/reasoning;safety precaution awareness;safety precaution follow-through;sequencing abilities  -ND               User Key  (r) = Recorded By, (t) = Taken By, (c) = Cosigned By      Initials Name Provider Type    ND Libra Lugo PT Physical Therapist                   Mobility       Row Name 03/27/24 1541          Bed Mobility    Bed Mobility supine-sit;sit-supine;scooting/bridging;rolling right  -ND     Rolling Right [place] (Bed Mobility) dependent (less than 25% patient effort);2 person assist;verbal cues;nonverbal cues (demo/gesture)  -ND     Scooting/Bridging [place] (Bed Mobility) dependent (less than 25% patient effort);2 person assist;verbal cues;nonverbal cues (demo/gesture)  -ND      Supine-Sit [place] (Bed Mobility) dependent (less than 25% patient effort);2 person assist;verbal cues;nonverbal cues (demo/gesture)  -ND     Sit-Supine [place] (Bed Mobility) dependent (less than 25% patient effort);2 person assist;verbal cues;nonverbal cues (demo/gesture)  -ND     Assistive Device (Bed Mobility) bed rails;draw sheet;head of bed elevated  -ND     Comment, (Bed Mobility) Attempted supine > sit but pt unable to achieve upright posture at EOB due to increased lethargy and significant weakness. Pt returned to supine due to safety concerns and boosted toward HOB for repositioning.  -ND       Row Name 03/27/24 1541          Transfers    Comment, (Transfers) Unable to perform transfers due to increased lethargy and decreased command following this date.  -ND               User Key  (r) = Recorded By, (t) = Taken By, (c) = Cosigned By      Initials Name Provider Type    Libra Tejeda, KYRA Physical Therapist                   Obj/Interventions       Row Name 03/27/24 1547          Balance    Comment, Balance Pt not following commands, unable to assess.  -ND               User Key  (r) = Recorded By, (t) = Taken By, (c) = Cosigned By      Initials Name Provider Type    Libra Tejeda, KYRA Physical Therapist                   Goals/Plan    No documentation.                  Clinical Impression       [facility] Name 03/27/24 7637          Pain    Additional Documentation Pain Scale: FACES Pre/Post-Treatment (Group)  -ND       Row Name 03/27/24 2871          Pain Scale: FACES Pre/Post-Treatment    Pain: FACES Scale, Pretreatment 0-->no hurt  -ND     Posttreatment Pain Rating 0-->no hurt  -ND       Row Name 03/27/24 0701          Plan of Care Review    Plan of Care Reviewed With patient  -ND     Progress no change  -ND     Outcome Evaluation Session limited by pt increased lethargy and minimal command follow this date. Pt presenting with significant generalized weakness, limited ROM due to  stiffness, limited activity tolerance, and impaired balance and coordination warranting continued skilled therapy to address deficits and facilitate increased independence with mobility. Recommend SNF following d/c.  -ND       Row Name 03/27/24 1547          Vital Signs    Pre Systolic BP Rehab 137  -ND     Pre Treatment Diastolic BP 55  -ND     Post Systolic BP Rehab 125  -ND     Post Treatment Diastolic BP 49  -ND     Pretreatment Heart Rate (beats/min) 73  -ND     Posttreatment Heart Rate (beats/min) 70  -ND     Pre SpO2 (%) 94  -ND     O2 Delivery Pre Treatment nasal cannula  -ND     O2 Delivery Intra Treatment nasal cannula  -ND     Post SpO2 (%) 95  -ND     O2 Delivery Post Treatment nasal cannula  -ND     Pre Patient Position Supine  -ND     Intra Patient Position Sitting  -ND     Post Patient Position Supine  -ND       Row Name 03/27/24 1547          Positioning and Restraints    Pre-Treatment Position in bed  -ND     Post Treatment Position bed  -ND     In Bed notified nsg;supine;call light within reach;encouraged to call for assist;exit alarm on;side rails up x3;RUE elevated;LUE elevated;SCD pump applied  -ND               User Key  (r) = Recorded By, (t) = Taken By, (c) = Cosigned By      Initials Name Provider Type    Libra Tejeda, PT Physical Therapist                   Outcome Measures       Row Name 03/27/24 1549 03/27/24 0800       How much help from another person do you currently need...    Turning from your back to your side while in flat bed without using bedrails? 1  -ND 1  -MP    Moving from lying on back to sitting on the side of a flat bed without bedrails? 1  -ND 1  -MP    Moving to and from a bed to a chair (including a wheelchair)? 1  -ND 1  -MP    Standing up from a chair using your arms (e.g., wheelchair, bedside chair)? 1  -ND 1  -MP    Climbing 3-5 steps with a railing? 1  -ND 1  -MP    To walk in hospital room? 1  -ND 1  -MP    AM-PAC 6 Clicks Score (PT) 6  -ND 6  -MP     Highest Level of Mobility Goal 2 --> Bed activities/dependent transfer  -ND 2 --> Bed activities/dependent transfer  -MP      Row Name 03/27/24 1549 03/27/24 1534       Functional Assessment    Outcome Measure Options AM-PAC 6 Clicks Basic Mobility (PT)  -ND AM-PAC 6 Clicks Daily Activity (OT)  -KF              User Key  (r) = Recorded By, (t) = Taken By, (c) = Cosigned By      Initials Name Provider Type    MP Karyn Ramirez, RN Registered Nurse    Natalie Menjivar, OT Occupational Therapist    Libra Tejeda, PT Physical Therapist                                 Physical Therapy Education       Title: PT OT SLP Therapies (In Progress)       Topic: Physical Therapy (In Progress)       Point: Mobility training (In Progress)       Learning Progress Summary             Patient Acceptance, E, NR by ND at 3/27/2024 1549    Acceptance, E, NR by ND at 3/26/2024 1517    Acceptance, E,TB, NR by ES at 3/25/2024 1043   Family Acceptance, E, NR by RL at 3/16/2024 0108                         Point: Home exercise program (In Progress)       Learning Progress Summary             Patient Acceptance, E, NR by ND at 3/27/2024 1549    Acceptance, E, NR by ND at 3/26/2024 1517   Family Acceptance, E, NR by RL at 3/16/2024 0108                         Point: Body mechanics (In Progress)       Learning Progress Summary             Patient Acceptance, E, NR by ND at 3/27/2024 1549    Acceptance, E, NR by ND at 3/26/2024 1517    Acceptance, E,TB, NR by ES at 3/25/2024 1043   Family Acceptance, E, NR by RL at 3/16/2024 0108                         Point: Precautions (In Progress)       Learning Progress Summary             Patient Acceptance, E, NR by ND at 3/27/2024 1549    Acceptance, E, NR by ND at 3/26/2024 1517    Acceptance, E,TB, NR by ES at 3/25/2024 1043   Family Acceptance, E, NR by RL at 3/16/2024 0108                                         User Key       Initials Effective Dates Name Provider Type Discipline    RL  06/16/21 -  Beth Land, RN Registered Nurse Nurse    ES 08/11/22 -  Emmy Castellanos, KYRA Physical Therapist PT    ND 11/16/23 -  Libra Lugo PT Physical Therapist PT                  PT Recommendation and Plan     Plan of Care Reviewed With: patient  Progress: no change  Outcome Evaluation: Session limited by pt increased lethargy and minimal command follow this date. Pt presenting with significant generalized weakness, limited ROM due to stiffness, limited activity tolerance, and impaired balance and coordination warranting continued skilled therapy to address deficits and facilitate increased independence with mobility. Recommend SNF following d/c.     Time Calculation:         PT Charges       Row Name 03/27/24 1550             Time Calculation    Start Time 1512  -ND      PT Received On 03/27/24  -ND         Timed Charges    17872 - PT Therapeutic Activity Minutes 10  -ND         Total Minutes    Timed Charges Total Minutes 10  -ND       Total Minutes 10  -ND                User Key  (r) = Recorded By, (t) = Taken By, (c) = Cosigned By      Initials Name Provider Type    ND Libra Lugo, PT Physical Therapist                  Therapy Charges for Today       Code Description Service Date Service Provider Modifiers Qty    01025955281 HC PT THER PROC EA 15 MIN 3/26/2024 Libra Lugo, PT GP 1    54598210226 HC PT THERAPEUTIC ACT EA 15 MIN 3/27/2024 Libra Lugo, PT GP 1            PT G-Codes  Outcome Measure Options: AM-PAC 6 Clicks Basic Mobility (PT)  AM-PAC 6 Clicks Score (PT): 6  AM-PAC 6 Clicks Score (OT): 6  PT Discharge Summary  Anticipated Discharge Disposition (PT): skilled nursing facility    Libra Lugo PT  3/27/2024

## 2024-03-27 NOTE — CASE MANAGEMENT/SOCIAL WORK
Continued Stay Note  [facility]     Patient Name: Omkar Nava  MRN: [id number]  Today's Date: 3/27/2024    Admit Date: 3/10/2024    Plan: Ongoing   Discharge Plan       Row Name 03/27/24 1343       Plan    Plan Ongoing    Plan Comments Spoke with patient's daughter, Idalia, by phone to discuss disposition options.  Idalia states she does want the patient to return to [facility] if possible; she understands the patient can go to [facility] with a PEG.  If patient requires a trach, he cannot return to [facility].  CM discussed LTAC options at [facility] in [place] and [facility] in [place].  We also discussed long term ventilator facility in [facility].  Discharge plan is ongoing at this time pending decisions on PEG placement and whether or not the patient will need a trach.  Magaly with [facility] updated.  CM will continue to follow.    Final Discharge Disposition Code 03 - skilled nursing facility (SNF)                   Discharge Codes    No documentation.                         Daisy Kelly RN

## 2024-03-27 NOTE — PLAN OF CARE
Goal Outcome Evaluation:  Plan of Care Reviewed With: patient        Progress: no change  Outcome Evaluation: Pt remains below his functional baseline with cognitive deficits and lethargy, generalized weakness, decreased activity tolerance, and balance deficits warranting continued IP OT services. Supine to sit transfer was attempted dependently, however pt unable to safely achieve upright posture. Facial hygiene completed with max hand over hand assist, near dependence. Pt limited this date by lethargy and weakness. Continue to recommend a d/c to SNF for best outcome.      Anticipated Discharge Disposition (OT): skilled nursing facility

## 2024-03-27 NOTE — PROGRESS NOTES
INTENSIVIST   PROGRESS NOTE     Hospital:  LOS: 16 days     Mr. Omkar Nava, 68 y.o. male is followed for a Chief Complaint of: Respiratory failure, pneumonia      Subjective   S     Interval History:  Extubated x 2 days.    Awake.    Increased respiratory and oral secretions.    Temp  Min: 95.5 °F (35.3 °C)  Max: 97.7 °F (36.5 °C)      The patient's relevant past medical, surgical and social history were reviewed and updated in Epic as appropriate.        History     Last Reviewed by Emmy Castellanos PT on 3/25/2024 at 10:35 AM    Sections Reviewed    Medical, Surgical, Family, Tobacco, Custom, Alcohol, Drug Use, Sexual   Activity    Problem list reviewed by Salo Arellano MD on 3/23/2024 at  1:36 PM  Medicines reviewed by Salo Arellano MD on 3/23/2024 at  1:36 PM  Allergies reviewed by Salo Arellano MD on 3/23/2024 at  1:36 PM          OBJECTIVE     Vitals:  Temp: 96.1 °F (35.6 °C) (24) Temp  Min: 95.5 °F (35.3 °C)  Max: 97.7 °F (36.5 °C)   Temp core:      BP: 116/79 (24) BP  Min: 102/42  Max: 146/74   MAP (non-invasive) Noninvasive MAP (mmHg): 94 (24) Noninvasive MAP (mmHg)  Av.8  Min: 28  Max: 138   Pulse: 66 (24) Pulse  Min: 56  Max: 68   Resp: 18 (24) Resp  Min: 18  Max: 22   SpO2: 95 % (24) SpO2  Min: 91 %  Max: 100 %   Device: humidified, nasal cannula (24)    Flow Rate: 2 (24) Flow (L/min)  Min: 2  Max: 2         24  0500 24  0433   Weight: 93 kg (205 lb 0.4 oz) 91.2 kg (201 lb 1 oz)        Intake/Ouptut 24 hrs (7:00AM - 6:59 AM)  Intake & Output (last 2 days)         03/25 0701   0700  0701   0700    I.V. (mL/kg) 850 (9.3) 221.2 (2.4)    Other 363 360    NG/ 1110    IV Piggyback  100    Total Intake(mL/kg) 1931 (21.2) 1791.2 (19.6)    Urine (mL/kg/hr) 1550 (0.7) 1475 (1.1)    Emesis/NG output      Stool 550 150    Total Output 2100 1625    Net -169 +166.2          Stool  Unmeasured Occurrence 2 x 2 x            Physical Examination  Telemetry:  Rhythm: normal sinus rhythm (03/26/24 1800)         Constitutional:  No acute distress.   Cardiovascular: RRR.    Respiratory: Normal breath sounds  (+) Rhonchi   Abdominal:  Soft with no tenderness.   Extremities: Edema   Neurological:   Awake.  Best Eye Response: 4-->(E4) spontaneous (03/26/24 1800)  Best Motor Response: 6-->(M6) obeys commands (03/26/24 1800)  Best Verbal Response: 4-->(V4) confused (03/26/24 1800)  [place] Coma Scale Score: 14 (03/26/24 1800)     Results Reviewed:  Laboratory  Microbiology  Radiology  Pathology    Hematology:  Results from last 7 days   Lab Units 03/26/24 0443 03/25/24 0027 03/24/24  0420   WBC 10*3/mm3 8.06 11.01* 11.84*   HEMOGLOBIN g/dL 7.5* 7.8* 8.3*   MCV fL 91.0 92.4 91.4   PLATELETS 10*3/mm3 479* 463* 498*     Results from last 7 days   Lab Units 03/26/24 0443 03/25/24 0027 03/24/24  0420   NEUTROS ABS 10*3/mm3 5.65 9.09* 9.31*   LYMPHS ABS 10*3/mm3 1.44 1.01 1.37   EOS ABS 10*3/mm3 0.25 0.23 0.20     Chemistry:  Estimated Creatinine Clearance: 82.9 mL/min (by C-G formula based on SCr of 1.13 mg/dL).    Results from last 7 days   Lab Units 03/26/24  1600 03/26/24 0443 03/25/24  1524 03/25/24  0027   SODIUM mmol/L  --  140 141 145   POTASSIUM mmol/L 4.2 3.6  --  3.9   CHLORIDE mmol/L  --  105  --  112*   CO2 mmol/L  --  29.0  --  27.0   BUN mg/dL  --  42*  --  40*   CREATININE mg/dL  --  1.13  --  1.25   GLUCOSE mg/dL  --  57*  --  235*     Results from last 7 days   Lab Units 03/26/24 0443 03/25/24  0027 03/24/24  0420 03/23/24  1330 03/23/24  0420   CALCIUM mg/dL 8.3* 7.6* 8.1*  --  7.9*   MAGNESIUM mg/dL  --  1.9  --   --  2.1   PHOSPHORUS mg/dL  --  3.4 3.2   < > 2.1*    < > = values in this interval not displayed.     COVID-19  Lab Results   Component Value Date    COVID19 Not Detected 03/10/2024    COVID19 Not Detected 03/10/2024    COVID19 Not Detected 02/05/2024    COVID19 Not Detected  02/05/2024       Images:  No radiology results for the last day    Echo:  Results for orders placed during the hospital encounter of 01/15/24    Adult Transthoracic Echo Complete With Contrast if Necessary Per Protocol    Interpretation Summary    Left ventricular ejection fraction appears to be 56 - 60%.    Left ventricular wall thickness is consistent with hypertrophy.    Estimated right ventricular systolic pressure from tricuspid regurgitation is mildly elevated (35-45 mmHg).    There is a small (1-2cm) pericardial effusion.    No evidence for pericardial tamponade      Results: Reviewed.  I reviewed the patient's new laboratory and imaging results.  I independently reviewed the patient's new images.    Medications: Reviewed.    Assessment   A/P     Hospital:  LOS: 16 days   ICU: 16d 9h     Active Hospital Problems    Diagnosis  POA    **Acute type 1 respiratory failure [J96.01]  Yes    Acute kidney injury superimposed on chronic kidney disease [N17.9, N18.9]  Yes    AMS (altered mental status) [R41.82]  Yes    Dysphagia [R13.10]  Yes    Dementia [F03.90]  Yes    Protein calorie malnutrition [E46]  Yes    Neurocognitive disorder [R41.9]  Yes    Essential hypertension [I10]  Yes    Type 2 diabetes mellitus with diabetic chronic kidney disease [E11.22]  Yes    Iron deficiency anemia [D50.9]  Yes     Formatting of this note might be different from the original.   Stable Hb , weekly labs    -Continue iron sulplement daily.       Omkar is a 68 y.o. male admitted on 3/10/2024 with Healthcare-associated pneumonia [J18.9]  Acute respiratory failure with hypoxemia [J96.01]    Omkar Nava is a 68 y.o. male dependent for mobility, bathing and dressing, who is now admitted for his third admission in the last 2.5 months for an aspiration event with aspiration pneumonia.      On his previous admission a modified barium swallow did reveal moderate oral and mild pharyngeal dysphagia February 5, 2024.    Respiratory status  deteriorated overnight on March 10 and he required intubation.    CTA of the chest was repeated and was negative for PE but revealed worsening bilateral pneumonia.      Assessment/Management/Treatment Plan:    Respiratory Failure  Intubated.  Invasive Mechanical Ventilation   Extubated 03/24/24  ESBL Klebsiella pneumonia  Cardiovascular  HFpEF  HTN  Neuro  Alzheimer's Dementia  Psychosis  Dysphagia  Renal  JAYY/CKD  Hematology  Chronic anemia, Iron deficiency  Nutrition Support     NG/OG Tube Nasojejunal 12 Fr Right nostril-Tube Feeding Product: Isosource 1.5  Novasource Renal (2.0 kcal/mL, 91 g protein/L, no fiber)  [REMOVED] NG/OG Tube 16 Fr Left nostril-Tube Feeding Rate (mL/hr): 0 mL/HR  NG/OG Tube Nasojejunal 12 Fr Right nostril-Tube Feeding Rate (mL/hr): 63 mL/HR (Based on a feeding duration of 20 h/d)      Lab Results   Component Value Date    PREALBUMIN 13.8 (L) 03/25/2024    CRP 4.73 (H) 03/25/2024    CRP 0.59 (H) 08/02/2023    CRP 0.30 03/06/2023    CRP 0.95 (H) 12/07/2022     Endocrine   Body mass index is 27.27 kg/m². Overweight: 25.0-29.9kg/m2   Type 2 diabetes.  Complicated by blindness.     Lab Results   Lab Value Date/Time    HGBA1C 7.00 (H) 10/16/2022 0432    HGBA1C 8.7 (H) 06/25/2022 0242    HGBA1C 13.4 04/14/2022 1058     Results from last 7 days   Lab Units 03/26/24  1819 03/26/24  1140 03/26/24  0622 03/26/24  0532 03/26/24  0528 03/26/24  0527 03/25/24  2335 03/25/24  1805   GLUCOSE mg/dL 241* 173* 123 65* 66* 68* 164* 286*       Diet: NPO Diet NPO Type: Strict NPO   Advance Directives: Code Status and Medical Interventions:   Ordered at: 03/10/24 1302     Code Status (Patient has no pulse and is not breathing):    CPR (Attempt to Resuscitate)     Medical Interventions (Patient has pulse or is breathing):    Full Support        DVT prophylaxis:  Medical and mechanical DVT prophylaxis orders are present.         In brief:  Monitor hypernatremia. Resolved.  Continue Enteral Nutrition  Continue  Ertapenem x 10 days total, as scheduled.  Goal: Glucose < 180 mg/dL.   Insulin SQ - No changes in current doses.  OK to discontinue Story if Nephrologist concurs.  F/U labs in AM.  Discussed with uday who is the POA  Even though he is off the Invasive Mechanical Ventilation, he is not better, he is having increased amount of oral secretions, which may lead to his re-intubation in the near future. He remains weak. With his underlying Alzheimer's disease, which he has had for 2-2.5 years, he may have suffered a further decline in his overall condition, as it is expected under these circumstanced.  She states she is in agreement with Re-Intubation if his condition declines. She also wants CPR in case of cardiac/respiratory arrest. However, if his back on the Invasive Mechanical Ventilation and not able to be liberated, she does not think a Trach would be beneficial with his underlying dementia. But she does not want to make any decisions yet, and see how his clinical condition evolves.   Disposition: Keep in ICU.  CM. Eventually he will need placement. Family does not want him back at [facility].    Plan of care and goals reviewed during interdisciplinary rounds.  I discussed the patient's findings and my recommendations with family and nursing staff    MDM:    Problem(s) High due to: Acute or Chronic illness or injury that may poses a threat to life or bodily function  Data: Moderate due to: Review of prior external records from each unique source, Review or results of each unique test, and Ordering of each unique test    Moderate    [x] Primary Attending Intensive Care Medicine - Nutrition Support   [] Consultant    Copied text in this note has been reviewed and is accurate as of 03/26/24

## 2024-03-27 NOTE — THERAPY RE-EVALUATION
Acute Care - Speech Language Pathology   Swallow Re-Evaluation [facility]    Clinical Swallow Evaluation       Patient Name: Omkar Nava  : 1955  MRN: [id number]  Today's Date: 3/27/2024               Admit Date: 3/10/2024    Visit Dx:     ICD-10-CM ICD-9-CM   1. Acute respiratory failure with hypoxia  J96.01 518.81   2. History of dementia  Z86.59 V11.8   3. Pneumonia due to infectious organism, unspecified laterality, unspecified part of lung  J18.9 486   4. Hypothermia, initial encounter  T68.XXXA 991.6   5. Anemia, unspecified type  D64.9 285.9   6. Hyperkalemia  E87.5 276.7   7. Encephalopathy  G93.40 348.30   8. Protein-calorie malnutrition, unspecified severity  E46 263.9   9. Iron deficiency anemia, unspecified iron deficiency anemia type  D50.9 280.9   10. 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, without long-term current use of insulin    Orthostatic hypotension    Acute osteomyelitis of left foot    Type 2 diabetes mellitus    Essential hypertension    Psychotic disorder    Neurocognitive disorder    S/P transmetatarsal amputation of foot, left    AMS (altered mental status)    Hypoxia    Abscess of left foot    Anemia of chronic disease    Aspiration pneumonia    Cellulitis of left toe    Cellulitis of lower extremity    Cervical spine pain    Chronic kidney disease    Closed head injury without loss of consciousness    Dementia    Dental cavities    Diarrhea of presumed infectious origin    Displaced fracture of proximal phalanx of left great toe, initial encounter for open fracture    Dysphagia    Erectile dysfunction    Fall    Gas gangrene    Generalized muscle weakness    Hallucinations    Hypertensive heart and chronic kidney disease 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 failure, with stage 1 through stage 4  chronic kidney disease, or unspecified chronic kidney disease    Insomnia due to medical condition    Iron deficiency anemia    Klebsiella pneumoniae (k. pneumoniae) as the cause of diseases classified elsewhere    Laceration without foreign body, left foot, subsequent encounter    Long term (current) use of insulin    Other acute osteomyelitis, left ankle and foot    Personal history of Methicillin resistant Staphylococcus aureus infection    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    Protein calorie malnutrition    Simple chronic bronchitis    Tobacco use    Type 2 diabetes mellitus with diabetic neuropathy, unspecified    Wound infection, posttraumatic    Type 2 diabetes mellitus with diabetic chronic kidney disease    Encephalopathy    Bilateral pneumonia    Acute kidney injury superimposed on chronic kidney disease    Acute type 1 respiratory failure     Past Medical History:   Diagnosis Date    Anemia     Dementia     Diabetes mellitus     Dysphagia     GERD (gastroesophageal reflux disease)     History of alcohol abuse     History of cocaine use     History of marijuana use     Hypertension     Osteomyelitis     Poor historian     records obtained from nursing home records & his family    Visual impairment      Past Surgical History:   Procedure Laterality Date    AMPUTATION FOOT Left 10/18/2022    Procedure: PARTIAL FIRST RAY AMPUTATION LEFT;  Surgeon: Yeison Petty MD;  Location:  [facility];  Service: Orthopedics;  Laterality: Left;    AMPUTATION FOOT Left 12/5/2022    Procedure: Transmetatarsal of Left Foot;  Surgeon: Yeison Petty MD;  Location:  [facility];  Service: Orthopedics;  Laterality: Left;    ENDOSCOPY N/A 3/14/2024    Procedure: ESOPHAGOGASTRODUODENOSCOPY WITH JEJUNAL TUBE INSERTION AT BEDSIDE;  Surgeon: Brunner, Mark I, MD;  Location:  [facility];  Service: Gastroenterology;  Laterality: N/A;    EYE SURGERY         SLP Recommendation and Plan  SLP Swallowing Diagnosis: suspected pharyngeal dysphagia,  other (see comments) (suspect severe) (03/27/24 1000)  SLP Diet Recommendation: NPO, temporary alternate methods of nutrition/hydration (03/27/24 1000)     SLP Rec. for Method of Medication Administration: meds via alternate route (03/27/24 1000)           Swallow Criteria for Skilled Therapeutic Interventions Met: demonstrates skilled criteria (03/27/24 1000)  Anticipated Discharge Disposition (SLP): inpatient rehabilitation facility (03/27/24 1000)  Rehab Potential/Prognosis, Swallowing: re-evaluate goals as necessary (03/27/24 1000)  Therapy Frequency (Swallow): 5 days per week (03/27/24 1000)  Predicted Duration Therapy Intervention (Days): until discharge (03/27/24 1000)  Oral Care Recommendations: Oral Care BID/PRN, Suction toothbrush (03/27/24 1000)                                               SWALLOW EVALUATION (Last 72 Hours)       SLP Adult Swallow Evaluation       Row Name 03/27/24 1000 03/25/24 1030                Rehab Evaluation    Document Type -- evaluation  -AC       Subjective Information -- no complaints  -AC       Patient Observations -- alert;cooperative  -AC       Patient/Family/Caregiver Comments/Observations -- No family present.  -AC       Patient Effort -- adequate  -AC       Oral Care -- teeth brushed - suction toothbrush;swabbed with antiseptic solution;suction provided;lip/mouth moisturizer applied  -AC          General Information    Patient Profile Reviewed -- yes  -AC       Pertinent History Of Current Problem -- Re-adm w/ respiratory failure, pna. Intubated 3/10-3/24. Hx dementia, JAYY, psych d/o, dysphagia. Evaluated by SLP in Feb when adm w/ encephalopathy & pna. MBS completed @ that time revealed impaired sensation, impulsivity. SLP initially recomended pureed diet and thin liquid via small cup sips, but later recommended downgrade to nectar-thick liquids.  -AC       Current Method of Nutrition -- NPO;nasogastric feedings;small-bore  -AC       Precautions/Limitations, Vision --  vision impairment, bilaterally  blind per RN  -AC       Precautions/Limitations, Hearing -- WFL;for purposes of eval  -AC       Prior Level of Function-Communication -- cognitive-linguistic impairment  -AC       Prior Level of Function-Swallowing -- --  hx dysphagia per last admission--diet prior to this admit unclear  -AC       Plans/Goals Discussed with -- patient  -AC       Barriers to Rehab -- medically complex;cognitive status;previous functional deficit  -AC       Patient's Goals for Discharge -- return to PO diet  asking for water  -AC          Pain Scale: FACES Pre/Post-Treatment    Pain: FACES Scale, Pretreatment 0-->no hurt  -CH 0-->no hurt  -AC       Posttreatment Pain Rating 0-->no hurt  -CH 0-->no hurt  -AC          Oral Motor Structure and Function    Dentition Assessment missing teeth;teeth are in poor condition  -CH missing teeth;teeth are in poor condition  -AC       Secretion Management wet vocal quality;requires suctioning to control secretions  -CH wet vocal quality;requires suctioning to control secretions  RN had performed NT suctioning prior to eval; required oral suctioning w/ Rickuer several times during eval  -AC       Mucosal Quality dry;sticky  -CH sticky  -AC       Volitional Swallow unable to elicit  -CH unable to elicit  -AC       Volitional Cough unable to elicit  -CH weak;reduced respiratory support  intermittently able to initiate w/ cues  -AC          Oral Musculature and Cranial Nerve Assessment    Oral Motor General Assessment generalized oral motor weakness  -CH generalized oral motor weakness  -AC       Oral Labial or Buccal Impairment, Detail, Cranial Nerve VII (Facial): reduced strength bilaterally  -CH --       Lingual Impairment, Detail. Cranial Nerves IX, XII (Glossopharyngeal and Hypoglossal) reduced lingual ROM;reduced strength;bilaterally  -CH --       Vocal Impairment, Detail. Cranial Nerve X (Vagus) vocal quality abnormality (see comments);other (see comments)   nearly aphonic  -CH --          General Eating/Swallowing Observations    Respiratory Support Currently in Use nasal cannula  -CH nasal cannula  -AC       O2 Liters 2L  -CH --       Eating/Swallowing Skills fed by SLP  -CH fed by SLP  -AC       Positioning During Eating upright 90 degree;upright in bed  -CH upright 90 degree;upright in bed  -AC       Utensils Used spoon  -CH --       Consistencies Trialed ice chips;pureed  -CH --       Pre SpO2 (%) 96  -CH --       Post SpO2 (%) 93  -CH --          Clinical Swallow Eval    Oral Prep Phase impaired  -CH --       Oral Transit impaired  -CH --       Oral Residue impaired  -CH --       Pharyngeal Phase suspected pharyngeal impairment  -CH --       Clinical Swallow Evaluation Summary Pt aphonic w/ poor secretion mgt. Wet vocal quality/respirations at baseline that pt was u/a to clear w/ cues/suctioning. Pt had difficulty initiating swallow with ice and pureed trials. Swabbed out manually with toothette. DNT further PO trials 2' severity of s/sxs aspiration. Suspect aspiration of secretions.  -CH Pt aphonic w/ poor secretion mgt. Wet vocal quality at baseline that pt was u/a to clear w/ cues/suctioning. Pt had difficulty initiating swallow after moistening mouth w/ toothette swab. DNT further PO trials 2' severity of s/sxs aspiration. Suspect aspirating secretions.  -AC          Oral Prep Concerns    Oral Prep Concerns inefficient mastication;incomplete or weak lip closure around spoon;anterior loss;incomplete bolus preparation;bolus removed from mouth manually  -CH --       Inefficient Mastication other (see comments)  ice and pureed  -CH --       Incomplete or Weak Lip Closure Around Spoon all consistencies  -CH --       Anterior Loss thin  -CH --       Incomplete Bolus Preparation all consistencies  -CH --       Bolus Removed from Mouth Manually all consistencies  -CH --          Oral Transit Concerns    Oral Transit Concerns unable to initiate oral transit  -CH --           Oral Residue Concerns    Oral Residue Concerns diffuse residue throughout oral cavity  -CH --       Diffuse Residue Throughout Oral Cavity all consistencies  -CH --          Pharyngeal Phase Concerns    Pharyngeal Phase Concerns wet vocal quality;other (see comments)  unable to initiate swallow  - --       Wet Vocal Quality all consistencies  - --          SLP Evaluation Clinical Impression    SLP Swallowing Diagnosis suspected pharyngeal dysphagia;other (see comments)  suspect severe  - suspected pharyngeal dysphagia  suspect severe based on clinical presentation  -       Functional Impact risk of aspiration/pneumonia  - risk of aspiration/pneumonia  -       Rehab Potential/Prognosis, Swallowing re-evaluate goals as necessary  - re-evaluate goals as necessary  -       Swallow Criteria for Skilled Therapeutic Interventions Met demonstrates skilled criteria  - demonstrates skilled criteria  -          Recommendations    Therapy Frequency (Swallow) 5 days per week  - 5 days per week  -       Predicted Duration Therapy Intervention (Days) until discharge  - until discharge  -       SLP Diet Recommendation NPO;temporary alternate methods of nutrition/hydration  - NPO;temporary alternate methods of nutrition/hydration  -       Recommended Diagnostics -- --  will continue to assess in tx--suspect may be some time before pt clinically appropriate to participate in FEES  -       Oral Care Recommendations Oral Care BID/PRN;Suction toothbrush  - Oral Care BID/PRN;Suction toothbrush  -       SLP Rec. for Method of Medication Administration meds via alternate route  - meds via alternate route  -       Anticipated Discharge Disposition (SLP) inpatient rehabilitation facility  - inpatient rehabilitation facility  -                 User Key  (r) = Recorded By, (t) = Taken By, (c) = Cosigned By      Initials Name Effective Dates     Kimberlee Castillo MS [facility]-SLP 02/03/23 -       Maya Riddle, MS CCC-SLP 06/16/21 -                     EDUCATION  The patient has been educated in the following areas:   Dysphagia (Swallowing Impairment) Oral Care/Hydration NPO rationale.        SLP GOALS       Row Name 03/27/24 1000 03/25/24 1030          (LTG) Patient will demonstrate functional swallow for    Diet Texture (Demonstrate functional swallow) pureed textures  -CH pureed textures  -AC     Liquid viscosity (Demonstrate functional swallow) nectar/ mildly thick liquids  -CH nectar/ mildly thick liquids  -AC     [place] (Demonstrate functional swallow) with 1:1 assist/ supervision  -CH with 1:1 assist/ supervision  -AC     Time Frame (Demonstrate functional swallow) by discharge  -CH by discharge  -AC        (STG) Patient will tolerate therapeutic trials of    Consistencies Trialed (Tolerate therapeutic trials) thin liquids;nectar/ mildly thick liquids;pureed textures  -CH thin liquids;nectar/ mildly thick liquids;pureed textures  -AC     Desired Outcome (Tolerate therapeutic trials) without signs/symptoms of aspiration;without signs of distress;with adequate oral prep/transit/clearance  -CH without signs/symptoms of aspiration;without signs of distress;with adequate oral prep/transit/clearance  -AC     [place] (Tolerate therapeutic trials) with 1:1 assist/ supervision  -CH with 1:1 assist/ supervision  -AC     Time Frame (Tolerate therapeutic trials) 1 week  -CH 1 week  -AC     Comment (Tolerate therapeutic trials) As appropriate pending secretion mgt  -CH As appropriate pending secretion mgt  -AC        (STG) Pharyngeal Strengthening Exercise Goal 1 (SLP)    Activity (Pharyngeal Strengthening Goal 1, SLP) increase pharyngeal sensation;increase timing  -CH increase pharyngeal sensation;increase timing  -AC     Increase Pharyngeal Sensation gustatory stimulation (sour/cold)  -CH gustatory stimulation (sour/cold)  -AC     Increase Timing prepping - 3 second prep or suck swallow or 3-step  swallow  -CH prepping - 3 second prep or suck swallow or 3-step swallow  -AC     [place]/Accuracy (Pharyngeal Strengthening Goal 1, SLP) with maximum cues (25-49% accuracy)  -CH with maximum cues (25-49% accuracy)  -AC     Time Frame (Pharyngeal Strengthening Goal 1, SLP) 1 week  -CH 1 week  -AC               User Key  (r) = Recorded By, (t) = Taken By, (c) = Cosigned By      Initials Name Provider Type    AC Kimberlee Castillo MS CCC-SLP Speech and Language Pathologist    Maya Broderick MS CCC-SLP Speech and Language Pathologist                       Time Calculation:    Time Calculation- SLP       Row Name 03/27/24 1609             Time Calculation- SLP    SLP Start Time 1000  -CH      SLP Received On 03/27/24  -CH         Untimed Charges    36741-SA Eval Oral Pharyng Swallow Minutes 45  -CH         Total Minutes    Untimed Charges Total Minutes 45  -CH       Total Minutes 45  -CH                User Key  (r) = Recorded By, (t) = Taken By, (c) = Cosigned By      Initials Name Provider Type     Maya Riddle MS CCC-SLP Speech and Language Pathologist                    Therapy Charges for Today       Code Description Service Date Service Provider Modifiers Qty    87944024366 HC ST EVAL ORAL PHARYNG SWALLOW 3 3/27/2024 Maya Riddle MS CCC-SLP GN 1                 Maya Riddle MS CCC-JOVANA  3/27/2024

## 2024-03-27 NOTE — PROGRESS NOTES
"   LOS: 17 days    Patient Care Team:  Deann Cao MD as PCP - General (Internal Medicine)    Subjective     Stable overnight.    Objective     Vital Signs:  Blood pressure 115/73, pulse 76, temperature 98.4 °F (36.9 °C), temperature source Bladder, resp. rate 18, height 182.9 cm (72\"), weight 92 kg (202 lb 13.2 oz), SpO2 97%.      Intake/Output Summary (Last 24 hours) at 3/27/2024 1034  Last data filed at 3/27/2024 0400  Gross per 24 hour   Intake 1646 ml   Output 1150 ml   Net 496 ml        03/26 0701 - 03/27 0700  In: 2406.2 [I.V.:221.2]  Out: 1825 [Urine:1675]    Physical Exam:            General Appearance: WD AAM NAD  Neuro:   awake alert   Psych:  Normal mood and affect, cooperative  CV:   Trace edema  Lungs: respirations regular and unlabored  Abdomen: not distended  : + mcghee  Skin: No rash, Warm and dry        Labs:  Results from last 7 days   Lab Units 03/27/24  0419 03/26/24  0443 03/25/24  0027   WBC 10*3/mm3 7.80 8.06 11.01*   HEMOGLOBIN g/dL 7.4* 7.5* 7.8*   PLATELETS 10*3/mm3 494* 479* 463*     Results from last 7 days   Lab Units 03/27/24  0419 03/26/24  1600 03/26/24  0443 03/25/24  1524 03/25/24  0027 03/24/24  0420 03/23/24  1330 03/23/24  0420 03/22/24  1753 03/22/24  0424   SODIUM mmol/L 143  --  140 141 145 149*  --  145  --  147*   POTASSIUM mmol/L 4.4 4.2 3.6  --  3.9 4.5  --  3.9   < > 3.4*   CHLORIDE mmol/L 108*  --  105  --  112* 114*  --  111*  --  115*   CO2 mmol/L 28.0  --  29.0  --  27.0 28.0  --  28.0  --  24.0   BUN mg/dL 41*  --  42*  --  40* 42*  --  38*  --  38*   CREATININE mg/dL 0.95  --  1.13  --  1.25 1.39*  --  1.15  --  1.15   CALCIUM mg/dL 8.2*  --  8.3*  --  7.6* 8.1*  --  7.9*  --  7.6*   PHOSPHORUS mg/dL 2.9  --   --   --  3.4 3.2 2.8 2.1*   < > 1.9*   MAGNESIUM mg/dL 2.2  --   --   --  1.9  --   --  2.1  --  2.0   ALBUMIN g/dL  --   --   --   --  2.4* 2.5*  --   --   --   --     < > = values in this interval not displayed.     Results from last 7 days "   Lab Units 03/25/24  0027   ALK PHOS U/L 333*   BILIRUBIN mg/dL <0.2   ALT (SGPT) U/L 380*   AST (SGOT) U/L 276*     Results from last 7 days   Lab Units 03/24/24  1725   PH, ARTERIAL pH units 7.414   PO2 ART mm Hg 81.7*   PCO2, ARTERIAL mm Hg 44.6   HCO3 ART mmol/L 28.5*               Estimated Creatinine Clearance: 99.5 mL/min (by C-G formula based on SCr of 0.95 mg/dL).         A/P:    ARF: Resolved.    CKD 3: Creatinine 0.9.  baseline creatinine previously 1.6-1.8.  For now continue supportive care.  Monitor renal function closely.    HTN: Blood pressure stable.  Monitor for now.    Hyponatremia: Resolved..  Continue free water replacement as needed.  Monitor closely.    Contraction metabolic alkalosis: Stable.  Monitor for now.    Respiratory failure: Extubated.  Thought due to ESBL Klebsiella pneumonia.    Not much to offer from a renal standpoint.  Nephrology will sign off for now.  Call if patient needs to be seen again.      Jg Lobo MD  03/27/24  10:34 EDT

## 2024-03-27 NOTE — PLAN OF CARE
Goal Outcome Evaluation:                     Anticipated Discharge Disposition (SLP): inpatient rehabilitation facility          SLP Swallowing Diagnosis: suspected pharyngeal dysphagia, other (see comments) (suspect severe) (03/27/24 1000)

## 2024-03-27 NOTE — PROGRESS NOTES
INTENSIVIST   PROGRESS NOTE     Hospital:  LOS: 17 days     Mr. Omkar Nava, 68 y.o. male is followed for a Chief Complaint of: Respiratory failure, pneumonia      Subjective   S     Interval History:  Extubated x 3 days.    Awake.    He has required less NT suctioning.    However he is weak.    Occ says few words.    Temp  Min: 96.1 °F (35.6 °C)  Max: 99.6 °F (37.6 °C)      The patient's relevant past medical, surgical and social history were reviewed and updated in Epic as appropriate.        History     Last Reviewed by Emmy Castellanos, PT on 3/25/2024 at 10:35 AM    Sections Reviewed    Medical, Surgical, Family, Tobacco, Custom, Alcohol, Drug Use, Sexual   Activity    Problem list reviewed by Salo Arellano MD on 3/23/2024 at  1:36 PM  Medicines reviewed by Salo Arellano MD on 3/23/2024 at  1:36 PM  Allergies reviewed by Salo Arellano MD on 3/23/2024 at  1:36 PM          OBJECTIVE     Vitals:  Temp: 99.1 °F (37.3 °C) (24 1600) Temp  Min: 96.1 °F (35.6 °C)  Max: 99.6 °F (37.6 °C)   Temp core:      BP: 135/62 (24 1400) BP  Min: 106/53  Max: 149/59   MAP (non-invasive) Noninvasive MAP (mmHg): 79 (24 1400) Noninvasive MAP (mmHg)  Av.6  Min: 28  Max: 138   Pulse: 70 (24 1400) Pulse  Min: 61  Max: 78   Resp: 20 (24 1600) Resp  Min: 18  Max: 20   SpO2: 95 % (24 1400) SpO2  Min: 92 %  Max: 98 %   Device: humidified, nasal cannula (24)    Flow Rate: 2 (24) Flow (L/min)  Min: 2  Max: 2         24  0433 24  0600   Weight: 91.2 kg (201 lb 1 oz) 92 kg (202 lb 13.2 oz)        Intake/Ouptut 24 hrs (7:00AM - 6:59 AM)  Intake & Output (last 2 days)          07 07 0701   07 07 07    I.V. (mL/kg) 850 (9.3) 221.2 (2.4)     Other 363 420 315    NG/ 1665 720    IV Piggyback  100     Total Intake(mL/kg) 1931 (21.2) 2406.2 (26.2) 1035 (11.3)    Urine (mL/kg/hr) 1550 (0.7) 1675 (0.8) 700 (0.7)     Emesis/NG output       Stool 550 150 500    Total Output 2100 1825 1200    Net -169 +581.2 -165           Stool Unmeasured Occurrence 2 x 2 x             Physical Examination  Telemetry:  Rhythm: normal sinus rhythm (03/27/24 1600)         Constitutional:  No acute distress.   Cardiovascular: RRR.    Respiratory: Normal breath sounds  (+) Rhonchi   Abdominal:  Soft with no tenderness.   Extremities: Edema   Neurological:   Awake.  Best Eye Response: 4-->(E4) spontaneous (03/27/24 1600)  Best Motor Response: 6-->(M6) obeys commands (03/27/24 1600)  Best Verbal Response: 4-->(V4) confused (03/27/24 1600)  [place] Coma Scale Score: 14 (03/27/24 1600)     Results Reviewed:  Laboratory  Microbiology  Radiology  Pathology    Hematology:  Results from last 7 days   Lab Units 03/27/24 0419 03/26/24 0443 03/25/24  0027   WBC 10*3/mm3 7.80 8.06 11.01*   HEMOGLOBIN g/dL 7.4* 7.5* 7.8*   MCV fL 91.4 91.0 92.4   PLATELETS 10*3/mm3 494* 479* 463*     Results from last 7 days   Lab Units 03/27/24 0419 03/26/24 0443 03/25/24  0027   NEUTROS ABS 10*3/mm3 5.85 5.65 9.09*   LYMPHS ABS 10*3/mm3 1.01 1.44 1.01   EOS ABS 10*3/mm3 0.30 0.25 0.23     Chemistry:  Estimated Creatinine Clearance: 99.5 mL/min (by C-G formula based on SCr of 0.95 mg/dL).    Results from last 7 days   Lab Units 03/27/24 0419 03/26/24 1600 03/26/24 0443   SODIUM mmol/L 143  --  140   POTASSIUM mmol/L 4.4 4.2 3.6   CHLORIDE mmol/L 108*  --  105   CO2 mmol/L 28.0  --  29.0   BUN mg/dL 41*  --  42*   CREATININE mg/dL 0.95  --  1.13   GLUCOSE mg/dL 162*  --  57*     Results from last 7 days   Lab Units 03/27/24 0419 03/26/24 0443 03/25/24  0027   CALCIUM mg/dL 8.2* 8.3* 7.6*   MAGNESIUM mg/dL 2.2  --  1.9   PHOSPHORUS mg/dL 2.9  --  3.4     COVID-19  Lab Results   Component Value Date    COVID19 Not Detected 03/10/2024    COVID19 Not Detected 03/10/2024    COVID19 Not Detected 02/05/2024    COVID19 Not Detected 02/05/2024       Images:  XR Chest 1  View    Result Date: 3/27/2024  Impression: Left basilar opacity with small left and trace right pleural effusion. This appears slightly improved from prior examination. Electronically Signed: Anil Krishnamurthy MD  3/27/2024 8:09 AM EDT  Workstation ID: [id number]     Echo:  Results for orders placed during the hospital encounter of 01/15/24    Adult Transthoracic Echo Complete With Contrast if Necessary Per Protocol    Interpretation Summary    Left ventricular ejection fraction appears to be 56 - 60%.    Left ventricular wall thickness is consistent with hypertrophy.    Estimated right ventricular systolic pressure from tricuspid regurgitation is mildly elevated (35-45 mmHg).    There is a small (1-2cm) pericardial effusion.    No evidence for pericardial tamponade      Results: Reviewed.  I reviewed the patient's new laboratory and imaging results.  I independently reviewed the patient's new images.    Medications: Reviewed.    Assessment   A/P     Hospital:  LOS: 17 days   ICU: 17d 6h     Active Hospital Problems    Diagnosis  POA    **Acute type 1 respiratory failure [J96.01]  Yes    Acute kidney injury superimposed on chronic kidney disease [N17.9, N18.9]  Yes    AMS (altered mental status) [R41.82]  Yes    Dysphagia [R13.10]  Yes    Dementia [F03.90]  Yes    Protein calorie malnutrition [E46]  Yes    Neurocognitive disorder [R41.9]  Yes    Essential hypertension [I10]  Yes    Type 2 diabetes mellitus with diabetic chronic kidney disease [E11.22]  Yes    Iron deficiency anemia [D50.9]  Yes     Formatting of this note might be different from the original.   Stable Hb , weekly labs    -Continue iron sulplement daily.       Omkar is a 68 y.o. male admitted on 3/10/2024 with Healthcare-associated pneumonia [J18.9]  Acute respiratory failure with hypoxemia [J96.01]    Omkar Nava is a 68 y.o. male dependent for mobility, bathing and dressing, who is now admitted for his third admission in the last 2.5 months for  an aspiration event with aspiration pneumonia.      On his previous admission a modified barium swallow did reveal moderate oral and mild pharyngeal dysphagia February 5, 2024.    Respiratory status deteriorated overnight on March 10 and he required intubation.    CTA of the chest was repeated and was negative for PE but revealed worsening bilateral pneumonia.      Assessment/Management/Treatment Plan:    Respiratory Failure  Intubated.  Invasive Mechanical Ventilation   Extubated 03/24/24  ESBL Klebsiella pneumonia  Cardiovascular  HFpEF  HTN  Neuro  Alzheimer's Dementia  Psychosis  Dysphagia  Renal  JAYY/CKD  Hematology  Chronic anemia, Iron deficiency  Nutrition Support     NG/OG Tube Nasojejunal 12 Fr Right nostril-Tube Feeding Product: Isosource 1.5  Novasource Renal (2.0 kcal/mL, 91 g protein/L, no fiber)  [REMOVED] NG/OG Tube 16 Fr Left nostril-Tube Feeding Rate (mL/hr): 0 mL/HR  NG/OG Tube Nasojejunal 12 Fr Right nostril-Tube Feeding Rate (mL/hr): 63 mL/HR (Based on a feeding duration of 20 h/d)      Lab Results   Component Value Date    PREALBUMIN 13.8 (L) 03/25/2024    CRP 4.73 (H) 03/25/2024    CRP 0.59 (H) 08/02/2023    CRP 0.30 03/06/2023    CRP 0.95 (H) 12/07/2022     Endocrine   Body mass index is 27.51 kg/m². Overweight: 25.0-29.9kg/m2   Type 2 diabetes.  Complicated by blindness.     Lab Results   Lab Value Date/Time    HGBA1C 7.00 (H) 10/16/2022 0432    HGBA1C 8.7 (H) 06/25/2022 0242    HGBA1C 13.4 04/14/2022 1058     Results from last 7 days   Lab Units 03/27/24  1744 03/27/24  1159 03/27/24  0548 03/26/24  2326 03/26/24  1819 03/26/24  1140 03/26/24  0622 03/26/24  0532   GLUCOSE mg/dL 181* 195* 178* 217* 241* 173* 123 65*       Diet: NPO Diet NPO Type: Strict NPO   Advance Directives: Code Status and Medical Interventions:   Ordered at: 03/10/24 1302     Code Status (Patient has no pulse and is not breathing):    CPR (Attempt to Resuscitate)     Medical Interventions (Patient has pulse or is  breathing):    Full Support        DVT prophylaxis:  Medical and mechanical DVT prophylaxis orders are present.         In brief:    Continue Enteral Nutrition  Continue Ertapenem x 10 days total - Completed on 03/25/24  Goal: Glucose < 180 mg/dL.   Insulin SQ - No changes in current doses.  Discontinue Story  F/U labs in AM.  Discussed with uday who is the POA 03/26/24  Even though he is off the Invasive Mechanical Ventilation, he is not better, he is having increased amount of oral secretions, which may lead to his re-intubation in the near future. He remains weak. With his underlying Alzheimer's disease, which he has had for 2-2.5 years, he may have suffered a further decline in his overall condition, as it is expected under these circumstanced.  She states she is in agreement with Re-Intubation if his condition declines. She also wants CPR in case of cardiac/respiratory arrest. However, if his back on the Invasive Mechanical Ventilation and not able to be liberated, she does not think a Trach would be beneficial with his underlying dementia. But she does not want to make any decisions yet, and see how his clinical condition evolves.   Disposition: Keep in ICU.  CM. Eventually he will need placement. Family does not want him back at [facility].  Most likely will need a PEG. Awaiting decision by [facility].     Plan of care and goals reviewed during interdisciplinary rounds.  I discussed the patient's findings and my recommendations with family and nursing staff    MDM:    Problem(s) High due to: Acute or Chronic illness or injury that may poses a threat to life or bodily function  Data: Moderate due to: Review of prior external records from each unique source, Review or results of each unique test, and Ordering of each unique test    Moderate    [x] Primary Attending Intensive Care Medicine - Nutrition Support   [] Consultant    Copied text in this note has been reviewed and is accurate as of 03/27/24

## 2024-03-28 PROBLEM — J15.0 KLEBSIELLA PNEUMONIA: Status: ACTIVE | Noted: 2022-11-06

## 2024-03-28 LAB
ABO GROUP BLD: NORMAL
ANION GAP SERPL CALCULATED.3IONS-SCNC: 7 MMOL/L (ref 5–15)
BASOPHILS # BLD AUTO: 0.03 10*3/MM3 (ref 0–0.2)
BASOPHILS NFR BLD AUTO: 0.4 % (ref 0–1.5)
BLD GP AB SCN SERPL QL: NEGATIVE
BUN SERPL-MCNC: 45 MG/DL (ref 8–23)
BUN/CREAT SERPL: 46.4 (ref 7–25)
CALCIUM SPEC-SCNC: 8.5 MG/DL (ref 8.6–10.5)
CHLORIDE SERPL-SCNC: 107 MMOL/L (ref 98–107)
CO2 SERPL-SCNC: 27 MMOL/L (ref 22–29)
CREAT SERPL-MCNC: 0.97 MG/DL (ref 0.76–1.27)
DEPRECATED RDW RBC AUTO: 51.8 FL (ref 37–54)
EGFRCR SERPLBLD CKD-EPI 2021: 86.1 ML/MIN/1.73
EOSINOPHIL # BLD AUTO: 0.32 10*3/MM3 (ref 0–0.4)
EOSINOPHIL NFR BLD AUTO: 3.9 % (ref 0.3–6.2)
ERYTHROCYTE [DISTWIDTH] IN BLOOD BY AUTOMATED COUNT: 15.6 % (ref 12.3–15.4)
GLUCOSE BLDC GLUCOMTR-MCNC: 162 MG/DL (ref 70–130)
GLUCOSE BLDC GLUCOMTR-MCNC: 174 MG/DL (ref 70–130)
GLUCOSE BLDC GLUCOMTR-MCNC: 185 MG/DL (ref 70–130)
GLUCOSE BLDC GLUCOMTR-MCNC: 193 MG/DL (ref 70–130)
GLUCOSE BLDC GLUCOMTR-MCNC: 200 MG/DL (ref 70–130)
GLUCOSE SERPL-MCNC: 161 MG/DL (ref 65–99)
HCT VFR BLD AUTO: 22.5 % (ref 37.5–51)
HCT VFR BLD AUTO: 22.5 % (ref 37.5–51)
HCT VFR BLD AUTO: 29.1 % (ref 37.5–51)
HGB BLD-MCNC: 6.8 G/DL (ref 13–17.7)
HGB BLD-MCNC: 6.8 G/DL (ref 13–17.7)
HGB BLD-MCNC: 9 G/DL (ref 13–17.7)
IMM GRANULOCYTES # BLD AUTO: 0.08 10*3/MM3 (ref 0–0.05)
IMM GRANULOCYTES NFR BLD AUTO: 1 % (ref 0–0.5)
LYMPHOCYTES # BLD AUTO: 1.21 10*3/MM3 (ref 0.7–3.1)
LYMPHOCYTES NFR BLD AUTO: 14.6 % (ref 19.6–45.3)
MCH RBC QN AUTO: 27.6 PG (ref 26.6–33)
MCHC RBC AUTO-ENTMCNC: 30.2 G/DL (ref 31.5–35.7)
MCV RBC AUTO: 91.5 FL (ref 79–97)
MONOCYTES # BLD AUTO: 0.56 10*3/MM3 (ref 0.1–0.9)
MONOCYTES NFR BLD AUTO: 6.8 % (ref 5–12)
NEUTROPHILS NFR BLD AUTO: 6.09 10*3/MM3 (ref 1.7–7)
NEUTROPHILS NFR BLD AUTO: 73.3 % (ref 42.7–76)
NRBC BLD AUTO-RTO: 0 /100 WBC (ref 0–0.2)
PLATELET # BLD AUTO: 454 10*3/MM3 (ref 140–450)
PMV BLD AUTO: 11.4 FL (ref 6–12)
POTASSIUM SERPL-SCNC: 4.7 MMOL/L (ref 3.5–5.2)
RBC # BLD AUTO: 2.46 10*6/MM3 (ref 4.14–5.8)
RH BLD: POSITIVE
SODIUM SERPL-SCNC: 141 MMOL/L (ref 136–145)
T&S EXPIRATION DATE: NORMAL
WBC NRBC COR # BLD AUTO: 8.29 10*3/MM3 (ref 3.4–10.8)

## 2024-03-28 PROCEDURE — 99232 SBSQ HOSP IP/OBS MODERATE 35: CPT | Performed by: INTERNAL MEDICINE

## 2024-03-28 PROCEDURE — 85018 HEMOGLOBIN: CPT

## 2024-03-28 PROCEDURE — 80048 BASIC METABOLIC PNL TOTAL CA: CPT | Performed by: INTERNAL MEDICINE

## 2024-03-28 PROCEDURE — 94799 UNLISTED PULMONARY SVC/PX: CPT

## 2024-03-28 PROCEDURE — 86901 BLOOD TYPING SEROLOGIC RH(D): CPT | Performed by: INTERNAL MEDICINE

## 2024-03-28 PROCEDURE — 25010000002 HEPARIN (PORCINE) PER 1000 UNITS: Performed by: INTERNAL MEDICINE

## 2024-03-28 PROCEDURE — 92526 ORAL FUNCTION THERAPY: CPT | Performed by: SPEECH-LANGUAGE PATHOLOGIST

## 2024-03-28 PROCEDURE — 86900 BLOOD TYPING SEROLOGIC ABO: CPT

## 2024-03-28 PROCEDURE — 85014 HEMATOCRIT: CPT

## 2024-03-28 PROCEDURE — 63710000001 INSULIN REGULAR HUMAN PER 5 UNITS: Performed by: INTERNAL MEDICINE

## 2024-03-28 PROCEDURE — 85018 HEMOGLOBIN: CPT | Performed by: INTERNAL MEDICINE

## 2024-03-28 PROCEDURE — 85014 HEMATOCRIT: CPT | Performed by: INTERNAL MEDICINE

## 2024-03-28 PROCEDURE — 94664 DEMO&/EVAL PT USE INHALER: CPT

## 2024-03-28 PROCEDURE — 25010000002 FUROSEMIDE PER 20 MG: Performed by: INTERNAL MEDICINE

## 2024-03-28 PROCEDURE — 85025 COMPLETE CBC W/AUTO DIFF WBC: CPT | Performed by: INTERNAL MEDICINE

## 2024-03-28 PROCEDURE — 82948 REAGENT STRIP/BLOOD GLUCOSE: CPT

## 2024-03-28 PROCEDURE — 36430 TRANSFUSION BLD/BLD COMPNT: CPT

## 2024-03-28 PROCEDURE — 86900 BLOOD TYPING SEROLOGIC ABO: CPT | Performed by: INTERNAL MEDICINE

## 2024-03-28 PROCEDURE — P9016 RBC LEUKOCYTES REDUCED: HCPCS

## 2024-03-28 PROCEDURE — 86850 RBC ANTIBODY SCREEN: CPT | Performed by: INTERNAL MEDICINE

## 2024-03-28 PROCEDURE — 86923 COMPATIBILITY TEST ELECTRIC: CPT

## 2024-03-28 PROCEDURE — 63710000001 INSULIN DETEMIR PER 5 UNITS: Performed by: INTERNAL MEDICINE

## 2024-03-28 RX ORDER — FUROSEMIDE 10 MG/ML
80 INJECTION INTRAMUSCULAR; INTRAVENOUS ONCE
Status: COMPLETED | OUTPATIENT
Start: 2024-03-28 | End: 2024-03-28

## 2024-03-28 RX ADMIN — FLUOXETINE 20 MG: 20 CAPSULE ORAL at 08:32

## 2024-03-28 RX ADMIN — HYDRALAZINE HYDROCHLORIDE 100 MG: 50 TABLET ORAL at 05:54

## 2024-03-28 RX ADMIN — ALBUTEROL SULFATE 2.5 MG: 2.5 SOLUTION RESPIRATORY (INHALATION) at 19:04

## 2024-03-28 RX ADMIN — HUMAN INSULIN 6 UNITS: 100 INJECTION, SOLUTION SUBCUTANEOUS at 05:54

## 2024-03-28 RX ADMIN — ACETAMINOPHEN 650 MG: 650 SOLUTION ORAL at 20:48

## 2024-03-28 RX ADMIN — CARVEDILOL 12.5 MG: 12.5 TABLET, FILM COATED ORAL at 08:33

## 2024-03-28 RX ADMIN — LANSOPRAZOLE 30 MG: 15 TABLET, ORALLY DISINTEGRATING ORAL at 05:54

## 2024-03-28 RX ADMIN — TERAZOSIN HYDROCHLORIDE ANHYDROUS 5 MG: 5 CAPSULE ORAL at 08:32

## 2024-03-28 RX ADMIN — TERAZOSIN HYDROCHLORIDE ANHYDROUS 5 MG: 5 CAPSULE ORAL at 20:15

## 2024-03-28 RX ADMIN — AMLODIPINE BESYLATE 10 MG: 10 TABLET ORAL at 08:33

## 2024-03-28 RX ADMIN — ALBUTEROL SULFATE 2.5 MG: 2.5 SOLUTION RESPIRATORY (INHALATION) at 11:55

## 2024-03-28 RX ADMIN — Medication 30 ML: at 08:35

## 2024-03-28 RX ADMIN — CLONIDINE HYDROCHLORIDE 0.2 MG: 0.2 TABLET ORAL at 05:54

## 2024-03-28 RX ADMIN — CLONIDINE HYDROCHLORIDE 0.2 MG: 0.2 TABLET ORAL at 14:10

## 2024-03-28 RX ADMIN — BRIMONIDINE TARTRATE 1 DROP: 2 SOLUTION/ DROPS OPHTHALMIC at 16:39

## 2024-03-28 RX ADMIN — ARIPIPRAZOLE 5 MG: 10 TABLET ORAL at 08:33

## 2024-03-28 RX ADMIN — INSULIN DETEMIR 8 UNITS: 100 INJECTION, SOLUTION SUBCUTANEOUS at 20:15

## 2024-03-28 RX ADMIN — HUMAN INSULIN 6 UNITS: 100 INJECTION, SOLUTION SUBCUTANEOUS at 18:43

## 2024-03-28 RX ADMIN — HYDRALAZINE HYDROCHLORIDE 100 MG: 50 TABLET ORAL at 14:11

## 2024-03-28 RX ADMIN — CLONIDINE HYDROCHLORIDE 0.2 MG: 0.2 TABLET ORAL at 22:08

## 2024-03-28 RX ADMIN — HUMAN INSULIN 2 UNITS: 100 INJECTION, SOLUTION SUBCUTANEOUS at 12:13

## 2024-03-28 RX ADMIN — TIMOLOL MALEATE 1 DROP: 5 SOLUTION/ DROPS OPHTHALMIC at 20:15

## 2024-03-28 RX ADMIN — TIMOLOL MALEATE 1 DROP: 5 SOLUTION/ DROPS OPHTHALMIC at 08:33

## 2024-03-28 RX ADMIN — ALBUTEROL SULFATE 2.5 MG: 2.5 SOLUTION RESPIRATORY (INHALATION) at 07:41

## 2024-03-28 RX ADMIN — BRIMONIDINE TARTRATE 1 DROP: 2 SOLUTION/ DROPS OPHTHALMIC at 08:33

## 2024-03-28 RX ADMIN — HEPARIN SODIUM 5000 UNITS: 5000 INJECTION INTRAVENOUS; SUBCUTANEOUS at 22:07

## 2024-03-28 RX ADMIN — HUMAN INSULIN 6 UNITS: 100 INJECTION, SOLUTION SUBCUTANEOUS at 12:14

## 2024-03-28 RX ADMIN — HEPARIN SODIUM 5000 UNITS: 5000 INJECTION INTRAVENOUS; SUBCUTANEOUS at 14:10

## 2024-03-28 RX ADMIN — ALBUTEROL SULFATE 2.5 MG: 2.5 SOLUTION RESPIRATORY (INHALATION) at 00:56

## 2024-03-28 RX ADMIN — CARVEDILOL 12.5 MG: 12.5 TABLET, FILM COATED ORAL at 20:15

## 2024-03-28 RX ADMIN — BRIMONIDINE TARTRATE 1 DROP: 2 SOLUTION/ DROPS OPHTHALMIC at 20:15

## 2024-03-28 RX ADMIN — INSULIN DETEMIR 8 UNITS: 100 INJECTION, SOLUTION SUBCUTANEOUS at 08:32

## 2024-03-28 RX ADMIN — FLUOXETINE 20 MG: 20 CAPSULE ORAL at 20:15

## 2024-03-28 RX ADMIN — HUMAN INSULIN 6 UNITS: 100 INJECTION, SOLUTION SUBCUTANEOUS at 00:07

## 2024-03-28 RX ADMIN — HUMAN INSULIN 4 UNITS: 100 INJECTION, SOLUTION SUBCUTANEOUS at 00:06

## 2024-03-28 RX ADMIN — Medication 30 ML: at 20:15

## 2024-03-28 RX ADMIN — HEPARIN SODIUM 5000 UNITS: 5000 INJECTION INTRAVENOUS; SUBCUTANEOUS at 05:54

## 2024-03-28 RX ADMIN — HUMAN INSULIN 2 UNITS: 100 INJECTION, SOLUTION SUBCUTANEOUS at 05:54

## 2024-03-28 RX ADMIN — FUROSEMIDE 80 MG: 10 INJECTION, SOLUTION INTRAMUSCULAR; INTRAVENOUS at 14:10

## 2024-03-28 RX ADMIN — TORSEMIDE 5 MG: 10 TABLET ORAL at 08:33

## 2024-03-28 RX ADMIN — HYDRALAZINE HYDROCHLORIDE 100 MG: 50 TABLET ORAL at 22:08

## 2024-03-28 RX ADMIN — HUMAN INSULIN 2 UNITS: 100 INJECTION, SOLUTION SUBCUTANEOUS at 18:43

## 2024-03-28 NOTE — PROGRESS NOTES
INTENSIVIST   PROGRESS NOTE     Hospital:  LOS: 18 days     Mr. Omkar Nava, 68 y.o. male is followed for a Chief Complaint of: Respiratory failure, pneumonia      Subjective   S     Interval History:  Extubated x 4 days.    Open eyes.    Weak.    Still issues with oral and respiratory secretions.    More anemic today.    Temp  Min: 97.9 °F (36.6 °C)  Max: 99.4 °F (37.4 °C)      The patient's relevant past medical, surgical and social history were reviewed and updated in Epic as appropriate.        History     Last Reviewed by Emmy Castellanos, KYRA on 3/25/2024 at 10:35 AM    Sections Reviewed    Medical, Surgical, Family, Tobacco, Custom, Alcohol, Drug Use, Sexual   Activity    Problem list reviewed by Salo Arellano MD on 3/23/2024 at  1:36 PM  Medicines reviewed by Salo Arellano MD on 3/23/2024 at  1:36 PM  Allergies reviewed by Salo Arellano MD on 3/23/2024 at  1:36 PM          OBJECTIVE     Vitals:  Temp: 98 °F (36.7 °C) (24) Temp  Min: 97.9 °F (36.6 °C)  Max: 99.4 °F (37.4 °C)   Temp core:      BP: 143/56 (24) BP  Min: 95/65  Max: 149/64   MAP (non-invasive) Noninvasive MAP (mmHg): 78 (24 1600) Noninvasive MAP (mmHg)  Av.4  Min: 28  Max: 138   Pulse: 70 (24) Pulse  Min: 64  Max: 73   Resp: 20 (24) Resp  Min: 18  Max: 24   SpO2: 98 % (24) SpO2  Min: 93 %  Max: 100 %   Device: humidified, nasal cannula (24)    Flow Rate: 2 (24) Flow (L/min)  Min: 2  Max: 2         24  0600 24  0600   Weight: 92 kg (202 lb 13.2 oz) 91.5 kg (201 lb 11.5 oz)        Intake/Ouptut 24 hrs (7:00AM - 6:59 AM)  Intake & Output (last 2 days)          07 07 07 07 07 07    I.V. (mL/kg) 221.2 (2.4)      Blood   757    Other 420 525 90    NG/GT 1665 1901 453    IV Piggyback 100      Total Intake(mL/kg) 2406.2 (26.2) 2426 (26.5) 1300 (14.2)    Urine (mL/kg/hr) 1675 (0.8) 1500 (0.7)      Stool 150 700     Total Output 1825 2200     Net +581.2 +226 +1300           Stool Unmeasured Occurrence 2 x              Physical Examination  Telemetry:  Rhythm: normal sinus rhythm (03/28/24 1200)         Constitutional:  No acute distress.   Cardiovascular: RRR.    Respiratory: Normal breath sounds  (+) Rhonchi   Abdominal:  Soft with no tenderness.   Extremities: Edema   Neurological:   Awake.  Best Eye Response: 4-->(E4) spontaneous (03/28/24 1200)  Best Motor Response: 6-->(M6) obeys commands (03/28/24 1200)  Best Verbal Response: 4-->(V4) confused (03/28/24 1200)  [place] Coma Scale Score: 14 (03/28/24 1200)     Results Reviewed:  Laboratory  Microbiology  Radiology  Pathology    Hematology:  Results from last 7 days   Lab Units 03/28/24  0643 03/28/24 0525 03/27/24 0419 03/26/24 0443   WBC 10*3/mm3  --  8.29 7.80 8.06   HEMOGLOBIN g/dL 6.8* 6.8* 7.4* 7.5*   MCV fL  --  91.5 91.4 91.0   PLATELETS 10*3/mm3  --  454* 494* 479*     Results from last 7 days   Lab Units 03/28/24  0525 03/27/24 0419 03/26/24 0443   NEUTROS ABS 10*3/mm3 6.09 5.85 5.65   LYMPHS ABS 10*3/mm3 1.21 1.01 1.44   EOS ABS 10*3/mm3 0.32 0.30 0.25     Chemistry:  Estimated Creatinine Clearance: 97 mL/min (by C-G formula based on SCr of 0.97 mg/dL).    Results from last 7 days   Lab Units 03/28/24  0525 03/27/24 0419   SODIUM mmol/L 141 143   POTASSIUM mmol/L 4.7 4.4   CHLORIDE mmol/L 107 108*   CO2 mmol/L 27.0 28.0   BUN mg/dL 45* 41*   CREATININE mg/dL 0.97 0.95   GLUCOSE mg/dL 161* 162*     Results from last 7 days   Lab Units 03/28/24  0525 03/27/24 0419 03/26/24 0443 03/25/24  0027   CALCIUM mg/dL 8.5* 8.2*   < > 7.6*   MAGNESIUM mg/dL  --  2.2  --  1.9   PHOSPHORUS mg/dL  --  2.9  --  3.4    < > = values in this interval not displayed.     COVID-19  Lab Results   Component Value Date    COVID19 Not Detected 03/10/2024    COVID19 Not Detected 03/10/2024    COVID19 Not Detected 02/05/2024    COVID19 Not Detected 02/05/2024        Images:  XR Chest 1 View    Result Date: 3/27/2024  Impression: Left basilar opacity with small left and trace right pleural effusion. This appears slightly improved from prior examination. Electronically Signed: Anil Krishnamurthy MD  3/27/2024 8:09 AM EDT  Workstation ID: [id number]     Echo:  Results for orders placed during the hospital encounter of 01/15/24    Adult Transthoracic Echo Complete With Contrast if Necessary Per Protocol    Interpretation Summary    Left ventricular ejection fraction appears to be 56 - 60%.    Left ventricular wall thickness is consistent with hypertrophy.    Estimated right ventricular systolic pressure from tricuspid regurgitation is mildly elevated (35-45 mmHg).    There is a small (1-2cm) pericardial effusion.    No evidence for pericardial tamponade      Results: Reviewed.  I reviewed the patient's new laboratory and imaging results.  I independently reviewed the patient's new images.    Medications: Reviewed.    Assessment   A/P     Hospital:  LOS: 18 days   ICU: 18d 5h     Active Hospital Problems    Diagnosis  POA    **Acute type 1 respiratory failure [J96.01]  Yes    Acute kidney injury superimposed on chronic kidney disease [N17.9, N18.9]  Yes    Dysphagia [R13.10]  Yes    Dementia [F03.90]  Yes    Protein calorie malnutrition [E46]  Yes    J15.0, ESBL Klebsiella pneumonia [J15.0]  Yes    Essential hypertension [I10]  Yes    Type 2 diabetes mellitus with diabetic chronic kidney disease [E11.22]  Yes     Omkar is a 68 y.o. male admitted on 3/10/2024 with Healthcare-associated pneumonia [J18.9]  Acute respiratory failure with hypoxemia [J96.01]    Omkar Nava is a 68 y.o. male dependent for mobility, bathing and dressing, who is now admitted for his third admission in the last 2.5 months for an aspiration event with aspiration pneumonia.      On his previous admission a modified barium swallow did reveal moderate oral and mild pharyngeal dysphagia February 5, 2024.     Respiratory status deteriorated overnight on March 10 and he required intubation.    CTA of the chest was repeated and was negative for PE but revealed worsening bilateral pneumonia.      Assessment/Management/Treatment Plan:    Respiratory Failure  Intubated.  Invasive Mechanical Ventilation   Extubated 03/24/24  ESBL Klebsiella pneumonia - Ertapenem x 10 days total - Completed on 03/25/24  Cardiovascular  HFpEF  HTN  Neuro  Alzheimer's Dementia  Psychosis  Dysphagia  Renal  JAYY/CKD  Hematology  Chronic NC anemia, Iron deficiency  Anemia ABL. Hb down to 6.8. PRBCs x 2 on 03/28/24   Nutrition Support     NG/OG Tube Nasojejunal 12 Fr Right nostril-Tube Feeding Product: Isosource 1.5  Novasource Renal (2.0 kcal/mL, 91 g protein/L, no fiber)  [REMOVED] NG/OG Tube 16 Fr Left nostril-Tube Feeding Rate (mL/hr): 0 mL/HR  NG/OG Tube Nasojejunal 12 Fr Right nostril-Tube Feeding Rate (mL/hr): 63 mL/HR (Based on a feeding duration of 20 h/d)      Lab Results   Component Value Date    PREALBUMIN 13.8 (L) 03/25/2024    CRP 4.73 (H) 03/25/2024    CRP 0.59 (H) 08/02/2023    CRP 0.30 03/06/2023    CRP 0.95 (H) 12/07/2022     Endocrine   Body mass index is 27.36 kg/m². Overweight: 25.0-29.9kg/m2   Type 2 diabetes.  Complicated by blindness.     Lab Results   Lab Value Date/Time    HGBA1C 7.00 (H) 10/16/2022 0432    HGBA1C 8.7 (H) 06/25/2022 0242    HGBA1C 13.4 04/14/2022 1058     Results from last 7 days   Lab Units 03/28/24  1121 03/28/24  0517 03/27/24  2327 03/27/24  1744 03/27/24  1159 03/27/24  0548 03/26/24  2326 03/26/24  1819   GLUCOSE mg/dL 193* 162* 200* 181* 195* 178* 217* 241*       Diet: NPO Diet NPO Type: Strict NPO   Advance Directives: Code Status and Medical Interventions:   Ordered at: 03/10/24 1302     Code Status (Patient has no pulse and is not breathing):    CPR (Attempt to Resuscitate)     Medical Interventions (Patient has pulse or is breathing):    Full Support        DVT prophylaxis:  Medical and  "mechanical DVT prophylaxis orders are present.         In brief:    Transfusion PRBCs today.  R/O GI as source of bleeding.  Continue Enteral Nutrition  Per SLT: \"Long term alternative method of nutrition pending GOC/POC\"  We don't expect dysphagia to improve any time soon.  Certainly with dementia benefit of PEG is limited.  Daughter is agreeable with PEG.  We will ask GI to se see.  We will also ask Palliative to see him and address issues with daughter including GOC and care after discharge from the Hospital.  Goal: Glucose < 180 mg/dL.   Insulin SQ - No changes in current doses.  F/U labs in AM.  Discussed with uday who is the POA 03/26/24  Even though he is off the Invasive Mechanical Ventilation, he is not better, he is having increased amount of oral secretions, which may lead to his re-intubation in the near future. He remains weak. With his underlying Alzheimer's disease, which he has had for 2-2.5 years, he may have suffered a further decline in his overall condition, as it is expected under these circumstanced.  She states she is in agreement with Re-Intubation if his condition declines. She also wants CPR in case of cardiac/respiratory arrest. However, if his back on the Invasive Mechanical Ventilation and not able to be liberated, she does not think a Trach would be beneficial with his underlying dementia. But she does not want to make any decisions yet, and see how his clinical condition evolves.   Disposition: Keep in ICU.  CM. Eventually he will need placement. Family does not want him back at [facility].  GI to see evaluate for PEG    Plan of care and goals reviewed during interdisciplinary rounds.  I discussed the patient's findings and my recommendations with family and nursing staff    MDM:    Problem(s) High due to: Acute or Chronic illness or injury that may poses a threat to life or bodily function  Data: Moderate due to: Review of prior external records from each unique source, Review or " results of each unique test, and Ordering of each unique test    Moderate    [x] Primary Attending Intensive Care Medicine - Nutrition Support   [] Consultant    Copied text in this note has been reviewed and is accurate as of 03/28/24

## 2024-03-28 NOTE — THERAPY TREATMENT NOTE
Acute Care - Speech Language Pathology   Swallow Treatment Note  Geraldo  Dysphagia therapy note     Patient Name: Omkar Nava  : 1955  MRN: [id number]  Today's Date: 3/28/2024               Admit Date: 3/10/2024    Visit Dx:     ICD-10-CM ICD-9-CM   1. Acute respiratory failure with hypoxia  J96.01 518.81   2. History of dementia  Z86.59 V11.8   3. Pneumonia due to infectious organism, unspecified laterality, unspecified part of lung  J18.9 486   4. Hypothermia, initial encounter  T68.XXXA 991.6   5. Anemia, unspecified type  D64.9 285.9   6. Hyperkalemia  E87.5 276.7   7. Encephalopathy  G93.40 348.30   8. Protein-calorie malnutrition, unspecified severity  E46 263.9   9. Iron deficiency anemia, unspecified iron deficiency anemia type  D50.9 280.9   10. 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, without long-term current use of insulin    Orthostatic hypotension    Acute osteomyelitis of left foot    Type 2 diabetes mellitus    Essential hypertension    Psychotic disorder    Neurocognitive disorder    S/P transmetatarsal amputation of foot, left    Hypoxia    Abscess of left foot    Anemia of chronic disease    Aspiration pneumonia    Cellulitis of left toe    Cellulitis of lower extremity    Cervical spine pain    Chronic kidney disease    Closed head injury without loss of consciousness    Dementia    Dental cavities    Diarrhea of presumed infectious origin    Displaced fracture of proximal phalanx of left great toe, initial encounter for open fracture    Dysphagia    Erectile dysfunction    Fall    Gas gangrene    Generalized muscle weakness    Hallucinations    Hypertensive heart and chronic kidney disease 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 failure, with stage 1 through stage 4 chronic kidney disease, or unspecified  chronic kidney disease    Insomnia due to medical condition    Iron deficiency anemia    J15.0, ESBL Klebsiella pneumonia    Laceration without foreign body, left foot, subsequent encounter    Long term (current) use of insulin    Other acute osteomyelitis, left ankle and foot    Personal history of Methicillin resistant Staphylococcus aureus infection    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    Protein calorie malnutrition    Simple chronic bronchitis    Tobacco use    Type 2 diabetes mellitus with diabetic neuropathy, unspecified    Wound infection, posttraumatic    Type 2 diabetes mellitus with diabetic chronic kidney disease    Encephalopathy    Bilateral pneumonia    Acute kidney injury superimposed on chronic kidney disease    Acute type 1 respiratory failure     Past Medical History:   Diagnosis Date    Anemia     Dementia     Diabetes mellitus     Dysphagia     GERD (gastroesophageal reflux disease)     History of alcohol abuse     History of cocaine use     History of marijuana use     Hypertension     Osteomyelitis     Poor historian     records obtained from nursing home records & his family    Visual impairment      Past Surgical History:   Procedure Laterality Date    AMPUTATION FOOT Left 10/18/2022    Procedure: PARTIAL FIRST RAY AMPUTATION LEFT;  Surgeon: Yeison Petty MD;  Location:  [facility];  Service: Orthopedics;  Laterality: Left;    AMPUTATION FOOT Left 12/5/2022    Procedure: Transmetatarsal of Left Foot;  Surgeon: Yeison Petty MD;  Location:  [facility];  Service: Orthopedics;  Laterality: Left;    ENDOSCOPY N/A 3/14/2024    Procedure: ESOPHAGOGASTRODUODENOSCOPY WITH JEJUNAL TUBE INSERTION AT BEDSIDE;  Surgeon: Brunner, Mark I, MD;  Location:  [facility];  Service: Gastroenterology;  Laterality: N/A;    EYE SURGERY         SLP Recommendation and Plan     SLP Diet Recommendation: NPO, temporary alternate methods of nutrition/hydration (03/28/24 1000)  Recommended Precautions and Strategies: general  aspiration precautions (03/28/24 1000)  SLP Rec. for Method of Medication Administration: meds via alternate route (03/28/24 1000)     Monitor for Signs of Aspiration: yes, notify SLP if any concerns (03/28/24 1000)  Recommended Diagnostics:  (will continue to assess in dx tx as pt is not appropriate for instrumental FEES at this time) (03/28/24 1000)     Anticipated Discharge Disposition (SLP): skilled nursing facility (03/28/24 1000)     Therapy Frequency (Swallow): 5 days per week (03/28/24 1000)  Predicted Duration Therapy Intervention (Days): until discharge (03/28/24 1000)  Oral Care Recommendations: Oral Care BID/PRN, Suction toothbrush (03/28/24 1000)        Daily Summary of Progress (SLP): progress toward functional goals is gradual (03/28/24 1000)               Treatment Assessment (SLP): continued, suspected, aspiration, pharyngeal dysphagia (03/28/24 1000)  Treatment Assessment Comments (SLP): Pt seen for tx this date. Pt continues w/ lethargy impacting ability to participate. Limited manipulation w/ trials of ice chips, tongue thrusting w/ all po presentations. Pharyngeal swallow only elicited in 1/5 attempts and noted audible upper airway secretions, unable to clear despite max cues. Further po presentations deferred 2/2 lethargy, overt s/s of aspiration, and poor secretion management. Pt is not appropriate for instrumental evaluation (FEES/MBS) 2/2 inconsistent swallow initiation. Safest continues to be NPO w/ long term alternative method of nutrition pending further GOC/POC. (03/28/24 1000)  Plan for Continued Treatment (SLP): continue treatment per plan of care (03/28/24 1000)         Plan of Care Reviewed With: patient  Progress: no change      SWALLOW EVALUATION (Last 72 Hours)       SLP Adult Swallow Evaluation       Row Name 03/28/24 1000       Rehab Evaluation    Document Type therapy note (daily note)  -CJ    Subjective Information no complaints  -CJ    Patient Observations  cooperative;lethargic  -    Patient/Family/Caregiver Comments/Observations no family present  -CJ    Patient Effort fair  -CJ    Symptoms Noted During/After Treatment none  -    Oral Care suction provided;tongue brushed;patient refused intervention  refused further intervention  -       General Information    Patient Profile Reviewed --    Pertinent History Of Current Problem --    Current Method of Nutrition --    Precautions/Limitations, Vision --    Precautions/Limitations, Hearing --    Prior Level of Function-Communication --    Prior Level of Function-Swallowing --    Plans/Goals Discussed with --    Barriers to Rehab --    Patient's Goals for Discharge --       Pain    Additional Documentation Pain Scale: FACES Pre/Post-Treatment (Group)  -       Pain Scale: FACES Pre/Post-Treatment    Pain: FACES Scale, Pretreatment 0-->no hurt  -CJ    Posttreatment Pain Rating 0-->no hurt  -          SLP Treatment Clinical Impressions    Treatment Assessment (SLP) continued;suspected;aspiration;pharyngeal dysphagia  -    Treatment Assessment Comments (SLP) Pt seen for tx this date. Pt continues w/ lethargy impacting ability to participate. Limited manipulation w/ trials of ice chips, tongue thrusting w/ all po presentations. Pharyngeal swallow only elicited in 1/5 attempts and noted audible upper airway secretions, unable to clear despite max cues. Further po presentations deferred 2/2 lethargy, overt s/s of aspiration, and poor secretion management. Pt is not appropriate for instrumental evaluation (FEES/MBS) 2/2 inconsistent swallow initiation. Safest continues to be NPO w/ long term alternative method of nutrition pending further GOC/POC.  -    Daily Summary of Progress (SLP) progress toward functional goals is gradual  -    Barriers to Overall Progress (SLP) Lethargy;Cognitive status;Baseline deficits;Medically complex  -    Plan for Continued Treatment (SLP) continue treatment per plan of care  -     Care Plan Review care plan/treatment goals reviewed  -       Recommendations    Therapy Frequency (Swallow) 5 days per week  -    Predicted Duration Therapy Intervention (Days) until discharge  -    SLP Diet Recommendation NPO;temporary alternate methods of nutrition/hydration  -    Recommended Diagnostics --  will continue to assess in dx tx as pt is not appropriate for instrumental FEES at this time  -    Recommended Precautions and Strategies general aspiration precautions  -    Oral Care Recommendations Oral Care BID/PRN;Suction toothbrush  -    SLP Rec. for Method of Medication Administration meds via alternate route  -    Monitor for Signs of Aspiration yes;notify SLP if any concerns  -    Anticipated Discharge Disposition (SLP) [facility]  -              User Key  (r) = Recorded By, (t) = Taken By, (c) = Cosigned By      Initials Name Effective Dates    AC Kimberlee Castillo, MS [facility]-SLP 02/03/23 -     Eliz Mooney, MS CCC-SLP 07/11/23 -     Maya Broderick MS CCC-SLP 06/16/21 -                     EDUCATION  The patient has been educated in the following areas:   Dysphagia (Swallowing Impairment) Oral Care/Hydration.        SLP GOALS       Row Name 03/28/24 1000       (LTG) Patient will demonstrate functional swallow for    Diet Texture (Demonstrate functional swallow) pureed textures  -CJ    Liquid viscosity (Demonstrate functional swallow) nectar/ mildly thick liquids  -CJ    [place] (Demonstrate functional swallow) with 1:1 assist/ supervision  -    Time Frame (Demonstrate functional swallow) by discharge  -CJ    Progress/Outcomes (Demonstrate functional swallow) progress slower than expected  -CJ       (STG) Patient will tolerate therapeutic trials of    Consistencies Trialed (Tolerate therapeutic trials) thin liquids;nectar/ mildly thick liquids;pureed textures  -    Desired Outcome (Tolerate therapeutic trials) without signs/symptoms of  aspiration;without signs of distress;with adequate oral prep/transit/clearance  -CJ    [place]ureau (Tolerate therapeutic trials) with 1:1 assist/ supervision  -CJ    Time Frame (Tolerate therapeutic trials) 1 week  -CJ    Progress/Outcomes (Tolerate therapeutic trials) progress slower than expected  -CJ    Comment (Tolerate therapeutic trials) swallow initiatied in 1/5 trials  -CJ       (STG) Pharyngeal Strengthening Exercise Goal 1 (SLP)    Activity (Pharyngeal Strengthening Goal 1, SLP) increase pharyngeal sensation;increase timing  -CJ    Increase Pharyngeal Sensation gustatory stimulation (sour/cold)  -CJ    Increase Timing prepping - 3 second prep or suck swallow or 3-step swallow  -CJ    Bureau/Accuracy (Pharyngeal Strengthening Goal 1, SLP) with maximum cues (25-49% accuracy)  -CJ    Time Frame (Pharyngeal Strengthening Goal 1, SLP) 1 week  -CJ    Progress/Outcomes (Pharyngeal Strengthening Goal 1, SLP) progress slower than expected  -CJ    Comment (Pharyngeal Strengthening Goal 1, SLP) audible upper airway secretions; swallow elicited x1  -CJ              User Key  (r) = Recorded By, (t) = Taken By, (c) = Cosigned By      Initials Name Provider Type    Kimberlee Pastor, MS CCC-SLP Speech and Language Pathologist    Eliz Mooney, MS CCC-SLP Speech and Language Pathologist    CH Maya Riddle, MS CCC-SLP Speech and Language Pathologist                       Time Calculation:    Time Calculation- SLP       Row Name 03/28/24 1017             Time Calculation- SLP    SLP Start Time 1000  -CJ      SLP Received On 03/28/24  -CJ         Untimed Charges    79701-YX Treatment Swallow Minutes 40  -CJ         Total Minutes    Untimed Charges Total Minutes 40  -CJ       Total Minutes 40  -CJ                User Key  (r) = Recorded By, (t) = Taken By, (c) = Cosigned By      Initials Name Provider Type    Eliz Mooney, MS CCC-SLP Speech and Language Pathologist                    Therapy Charges  for Today       Code Description Service Date Service Provider Modifiers Qty    30218757333 HC ST TREATMENT SWALLOW 3 3/28/2024 Eliz Cartagena, MS CCC-SLP GN 1                 Eliz Cartagena MS [facility]-SLP  3/28/2024

## 2024-03-28 NOTE — PLAN OF CARE
Goal Outcome Evaluation:  Plan of Care Reviewed With: patient        Progress: no change         Anticipated Discharge Disposition (SLP): skilled nursing facility             Treatment Assessment (SLP): continued, suspected, aspiration, pharyngeal dysphagia (03/28/24 1000)  Treatment Assessment Comments (SLP): Pt seen for tx this date. Pt continues w/ lethargy impacting ability to participate. Limited manipulation w/ trials of ice chips, tongue thrusting w/ all po presentations. Pharyngeal swallow only elicited in 1/5 attempts and noted audible upper airway secretions, unable to clear despite max cues. Further po presentations deferred 2/2 lethargy, overt s/s of aspiration, and poor secretion management. Pt is not appropriate for instrumental evaluation (FEES/MBS) 2/2 inconsistent swallow initiation. Safest continues to be NPO w/ long term alternative method of nutrition pending further GOC/POC. (03/28/24 1000)  Plan for Continued Treatment (SLP): continue treatment per plan of care (03/28/24 1000)

## 2024-03-29 ENCOUNTER — APPOINTMENT (OUTPATIENT)
Dept: GENERAL RADIOLOGY | Facility: HOSPITAL | Age: 69
End: 2024-03-29
Payer: MEDICARE

## 2024-03-29 LAB
ANION GAP SERPL CALCULATED.3IONS-SCNC: 6 MMOL/L (ref 5–15)
ANION GAP SERPL CALCULATED.3IONS-SCNC: 7 MMOL/L (ref 5–15)
BASOPHILS # BLD AUTO: 0.06 10*3/MM3 (ref 0–0.2)
BASOPHILS NFR BLD AUTO: 0.7 % (ref 0–1.5)
BH BB BLOOD EXPIRATION DATE: NORMAL
BH BB BLOOD EXPIRATION DATE: NORMAL
BH BB BLOOD TYPE BARCODE: 5100
BH BB BLOOD TYPE BARCODE: 5100
BH BB DISPENSE STATUS: NORMAL
BH BB DISPENSE STATUS: NORMAL
BH BB PRODUCT CODE: NORMAL
BH BB PRODUCT CODE: NORMAL
BH BB UNIT NUMBER: NORMAL
BH BB UNIT NUMBER: NORMAL
BUN SERPL-MCNC: 50 MG/DL (ref 8–23)
BUN SERPL-MCNC: 50 MG/DL (ref 8–23)
BUN/CREAT SERPL: 41.7 (ref 7–25)
BUN/CREAT SERPL: 44.6 (ref 7–25)
CALCIUM SPEC-SCNC: 8.4 MG/DL (ref 8.6–10.5)
CALCIUM SPEC-SCNC: 8.9 MG/DL (ref 8.6–10.5)
CHLORIDE SERPL-SCNC: 108 MMOL/L (ref 98–107)
CHLORIDE SERPL-SCNC: 109 MMOL/L (ref 98–107)
CO2 SERPL-SCNC: 28 MMOL/L (ref 22–29)
CO2 SERPL-SCNC: 30 MMOL/L (ref 22–29)
CREAT SERPL-MCNC: 1.12 MG/DL (ref 0.76–1.27)
CREAT SERPL-MCNC: 1.2 MG/DL (ref 0.76–1.27)
CROSSMATCH INTERPRETATION: NORMAL
CROSSMATCH INTERPRETATION: NORMAL
DEPRECATED RDW RBC AUTO: 53.5 FL (ref 37–54)
EGFRCR SERPLBLD CKD-EPI 2021: 66.7 ML/MIN/1.73
EGFRCR SERPLBLD CKD-EPI 2021: 72.5 ML/MIN/1.73
EOSINOPHIL # BLD AUTO: 0.35 10*3/MM3 (ref 0–0.4)
EOSINOPHIL NFR BLD AUTO: 4 % (ref 0.3–6.2)
ERYTHROCYTE [DISTWIDTH] IN BLOOD BY AUTOMATED COUNT: 16.2 % (ref 12.3–15.4)
GLUCOSE BLDC GLUCOMTR-MCNC: 124 MG/DL (ref 70–130)
GLUCOSE BLDC GLUCOMTR-MCNC: 132 MG/DL (ref 70–130)
GLUCOSE BLDC GLUCOMTR-MCNC: 144 MG/DL (ref 70–130)
GLUCOSE BLDC GLUCOMTR-MCNC: 153 MG/DL (ref 70–130)
GLUCOSE SERPL-MCNC: 134 MG/DL (ref 65–99)
GLUCOSE SERPL-MCNC: 158 MG/DL (ref 65–99)
HCT VFR BLD AUTO: 27.9 % (ref 37.5–51)
HCT VFR BLD AUTO: 28.4 % (ref 37.5–51)
HGB BLD-MCNC: 8.7 G/DL (ref 13–17.7)
HGB BLD-MCNC: 8.8 G/DL (ref 13–17.7)
IMM GRANULOCYTES # BLD AUTO: 0.09 10*3/MM3 (ref 0–0.05)
IMM GRANULOCYTES NFR BLD AUTO: 1 % (ref 0–0.5)
INR PPP: 1.05 (ref 0.89–1.12)
LYMPHOCYTES # BLD AUTO: 1.06 10*3/MM3 (ref 0.7–3.1)
LYMPHOCYTES NFR BLD AUTO: 12 % (ref 19.6–45.3)
MAGNESIUM SERPL-MCNC: 2.4 MG/DL (ref 1.6–2.4)
MCH RBC QN AUTO: 28.2 PG (ref 26.6–33)
MCHC RBC AUTO-ENTMCNC: 31.2 G/DL (ref 31.5–35.7)
MCV RBC AUTO: 90.3 FL (ref 79–97)
MONOCYTES # BLD AUTO: 0.43 10*3/MM3 (ref 0.1–0.9)
MONOCYTES NFR BLD AUTO: 4.9 % (ref 5–12)
NEUTROPHILS NFR BLD AUTO: 6.87 10*3/MM3 (ref 1.7–7)
NEUTROPHILS NFR BLD AUTO: 77.4 % (ref 42.7–76)
NRBC BLD AUTO-RTO: 0 /100 WBC (ref 0–0.2)
PHOSPHATE SERPL-MCNC: 4.1 MG/DL (ref 2.5–4.5)
PLATELET # BLD AUTO: 400 10*3/MM3 (ref 140–450)
PMV BLD AUTO: 12 FL (ref 6–12)
POTASSIUM SERPL-SCNC: 5 MMOL/L (ref 3.5–5.2)
POTASSIUM SERPL-SCNC: 5.4 MMOL/L (ref 3.5–5.2)
PROTHROMBIN TIME: 13.8 SECONDS (ref 12.2–14.5)
RBC # BLD AUTO: 3.09 10*6/MM3 (ref 4.14–5.8)
SODIUM SERPL-SCNC: 143 MMOL/L (ref 136–145)
SODIUM SERPL-SCNC: 145 MMOL/L (ref 136–145)
UNIT  ABO: NORMAL
UNIT  ABO: NORMAL
UNIT  RH: NORMAL
UNIT  RH: NORMAL
WBC NRBC COR # BLD AUTO: 8.86 10*3/MM3 (ref 3.4–10.8)

## 2024-03-29 PROCEDURE — 71045 X-RAY EXAM CHEST 1 VIEW: CPT

## 2024-03-29 PROCEDURE — 63710000001 INSULIN DETEMIR PER 5 UNITS: Performed by: INTERNAL MEDICINE

## 2024-03-29 PROCEDURE — 99233 SBSQ HOSP IP/OBS HIGH 50: CPT | Performed by: INTERNAL MEDICINE

## 2024-03-29 PROCEDURE — 97530 THERAPEUTIC ACTIVITIES: CPT

## 2024-03-29 PROCEDURE — 94664 DEMO&/EVAL PT USE INHALER: CPT

## 2024-03-29 PROCEDURE — 94799 UNLISTED PULMONARY SVC/PX: CPT

## 2024-03-29 PROCEDURE — 94761 N-INVAS EAR/PLS OXIMETRY MLT: CPT

## 2024-03-29 PROCEDURE — 63710000001 INSULIN REGULAR HUMAN PER 5 UNITS: Performed by: INTERNAL MEDICINE

## 2024-03-29 PROCEDURE — 25010000002 HEPARIN (PORCINE) PER 1000 UNITS: Performed by: INTERNAL MEDICINE

## 2024-03-29 PROCEDURE — 84100 ASSAY OF PHOSPHORUS: CPT | Performed by: INTERNAL MEDICINE

## 2024-03-29 PROCEDURE — 83735 ASSAY OF MAGNESIUM: CPT | Performed by: INTERNAL MEDICINE

## 2024-03-29 PROCEDURE — 85018 HEMOGLOBIN: CPT | Performed by: INTERNAL MEDICINE

## 2024-03-29 PROCEDURE — 85014 HEMATOCRIT: CPT | Performed by: INTERNAL MEDICINE

## 2024-03-29 PROCEDURE — 85610 PROTHROMBIN TIME: CPT | Performed by: INTERNAL MEDICINE

## 2024-03-29 PROCEDURE — 92526 ORAL FUNCTION THERAPY: CPT

## 2024-03-29 PROCEDURE — 82948 REAGENT STRIP/BLOOD GLUCOSE: CPT

## 2024-03-29 PROCEDURE — 85025 COMPLETE CBC W/AUTO DIFF WBC: CPT | Performed by: INTERNAL MEDICINE

## 2024-03-29 PROCEDURE — 80048 BASIC METABOLIC PNL TOTAL CA: CPT | Performed by: INTERNAL MEDICINE

## 2024-03-29 RX ADMIN — TIMOLOL MALEATE 1 DROP: 5 SOLUTION/ DROPS OPHTHALMIC at 20:21

## 2024-03-29 RX ADMIN — HEPARIN SODIUM 5000 UNITS: 5000 INJECTION INTRAVENOUS; SUBCUTANEOUS at 06:15

## 2024-03-29 RX ADMIN — INSULIN DETEMIR 8 UNITS: 100 INJECTION, SOLUTION SUBCUTANEOUS at 08:19

## 2024-03-29 RX ADMIN — HYDRALAZINE HYDROCHLORIDE 100 MG: 50 TABLET ORAL at 22:30

## 2024-03-29 RX ADMIN — BRIMONIDINE TARTRATE 1 DROP: 2 SOLUTION/ DROPS OPHTHALMIC at 08:19

## 2024-03-29 RX ADMIN — TIMOLOL MALEATE 1 DROP: 5 SOLUTION/ DROPS OPHTHALMIC at 08:19

## 2024-03-29 RX ADMIN — BRIMONIDINE TARTRATE 1 DROP: 2 SOLUTION/ DROPS OPHTHALMIC at 20:21

## 2024-03-29 RX ADMIN — FLUOXETINE 20 MG: 20 CAPSULE ORAL at 08:20

## 2024-03-29 RX ADMIN — HYDRALAZINE HYDROCHLORIDE 100 MG: 50 TABLET ORAL at 14:33

## 2024-03-29 RX ADMIN — ALBUTEROL SULFATE 2.5 MG: 2.5 SOLUTION RESPIRATORY (INHALATION) at 14:10

## 2024-03-29 RX ADMIN — FLUOXETINE 20 MG: 20 CAPSULE ORAL at 20:20

## 2024-03-29 RX ADMIN — HUMAN INSULIN 6 UNITS: 100 INJECTION, SOLUTION SUBCUTANEOUS at 00:13

## 2024-03-29 RX ADMIN — Medication 30 ML: at 09:02

## 2024-03-29 RX ADMIN — ALBUTEROL SULFATE 2.5 MG: 2.5 SOLUTION RESPIRATORY (INHALATION) at 08:10

## 2024-03-29 RX ADMIN — ALBUTEROL SULFATE 2.5 MG: 2.5 SOLUTION RESPIRATORY (INHALATION) at 19:28

## 2024-03-29 RX ADMIN — Medication 30 ML: at 20:20

## 2024-03-29 RX ADMIN — HUMAN INSULIN 6 UNITS: 100 INJECTION, SOLUTION SUBCUTANEOUS at 06:14

## 2024-03-29 RX ADMIN — HEPARIN SODIUM 5000 UNITS: 5000 INJECTION INTRAVENOUS; SUBCUTANEOUS at 14:33

## 2024-03-29 RX ADMIN — CLONIDINE HYDROCHLORIDE 0.2 MG: 0.2 TABLET ORAL at 22:30

## 2024-03-29 RX ADMIN — TORSEMIDE 5 MG: 10 TABLET ORAL at 08:19

## 2024-03-29 RX ADMIN — HEPARIN SODIUM 5000 UNITS: 5000 INJECTION INTRAVENOUS; SUBCUTANEOUS at 22:30

## 2024-03-29 RX ADMIN — HUMAN INSULIN 6 UNITS: 100 INJECTION, SOLUTION SUBCUTANEOUS at 18:27

## 2024-03-29 RX ADMIN — HUMAN INSULIN 2 UNITS: 100 INJECTION, SOLUTION SUBCUTANEOUS at 00:12

## 2024-03-29 RX ADMIN — HUMAN INSULIN 6 UNITS: 100 INJECTION, SOLUTION SUBCUTANEOUS at 12:26

## 2024-03-29 RX ADMIN — CARVEDILOL 12.5 MG: 12.5 TABLET, FILM COATED ORAL at 08:20

## 2024-03-29 RX ADMIN — TERAZOSIN HYDROCHLORIDE ANHYDROUS 5 MG: 5 CAPSULE ORAL at 08:20

## 2024-03-29 RX ADMIN — HYDRALAZINE HYDROCHLORIDE 100 MG: 50 TABLET ORAL at 06:14

## 2024-03-29 RX ADMIN — AMLODIPINE BESYLATE 10 MG: 10 TABLET ORAL at 08:20

## 2024-03-29 RX ADMIN — INSULIN DETEMIR 8 UNITS: 100 INJECTION, SOLUTION SUBCUTANEOUS at 20:20

## 2024-03-29 RX ADMIN — ARIPIPRAZOLE 5 MG: 10 TABLET ORAL at 08:20

## 2024-03-29 RX ADMIN — TERAZOSIN HYDROCHLORIDE ANHYDROUS 5 MG: 5 CAPSULE ORAL at 20:20

## 2024-03-29 RX ADMIN — CLONIDINE HYDROCHLORIDE 0.2 MG: 0.2 TABLET ORAL at 06:14

## 2024-03-29 RX ADMIN — CLONIDINE HYDROCHLORIDE 0.2 MG: 0.2 TABLET ORAL at 14:33

## 2024-03-29 RX ADMIN — BRIMONIDINE TARTRATE 1 DROP: 2 SOLUTION/ DROPS OPHTHALMIC at 16:38

## 2024-03-29 RX ADMIN — CARVEDILOL 12.5 MG: 12.5 TABLET, FILM COATED ORAL at 20:21

## 2024-03-29 RX ADMIN — LANSOPRAZOLE 30 MG: 15 TABLET, ORALLY DISINTEGRATING ORAL at 06:14

## 2024-03-29 NOTE — CONSULTS
"Palliative Care Initial Consult   Attending Physician: Salo Arellano MD  Referring Provider: Dr. Salo Arellano    Reason for Referral:  assistance with advance directives and assistance with clarification of goals of care    Code Status:   Code Status and Medical Interventions:   Ordered at: 03/10/24 1302     Code Status (Patient has no pulse and is not breathing):    CPR (Attempt to Resuscitate)     Medical Interventions (Patient has pulse or is breathing):    Full Support      Advanced Directives: Advance Directive Status: Patient has advance directive, copy requested   Family/Support: Idalia Lerma (dtr), Thang Horton (sister)  Goals of Care: TBD.    HPI: Omkar Nava is a 68 y.o. male with PMH significant for HFpEF (ECHO 1/15/24 LVEF 56-60%), HTN, T2DM, blind, CKD III, chronic anemia, osteomyelitis s/p transmetatarsal amputation, dementia, recent hospitalization 2/5-2/13 with pulmonary edema, dementia with psychosis, JAYY on CKD and dysphagia, and prior admission to that 1/15-1/21 for pneumonia. Patient presented to [facility] ED on 3/10 from [facility] due to choking on his food and requiring Heimlich maneuver, later decreased LOC and cyanotic. Work up revealed CT imaging showing bilateral peripheral patchy infiltrates and basilar infiltrates, ESBL Klebsiella pneumonia with antibiotics. Hospitalization complicated by respiratory failure, requiring intubation, extubated 3/24. SLP evaluation limited by lethargy, NPO, daughter considering PEG. Palliative Care consulted for GOC in the context of complex medical decision making.   No family at bedside. Patient awakens easily but falls back asleep. Shakes/nods head to answer questions, did state, \"no\" to shortness of breath.   Called, Idalia, daughter on phone. Reports patient with significant decline.    ROS: States, \"no\" to SOB. Shakes head to pain. ROS limited by AMS/confusion. LBM 3/29.      Past Medical History:   Diagnosis Date    Anemia     Dementia     " "Diabetes mellitus     Dysphagia     GERD (gastroesophageal reflux disease)     History of alcohol abuse     History of cocaine use     History of marijuana use     Hypertension     Osteomyelitis     Poor historian     records obtained from nursing home records & his family    Visual impairment      Past Surgical History:   Procedure Laterality Date    AMPUTATION FOOT Left 10/18/2022    Procedure: PARTIAL FIRST RAY AMPUTATION LEFT;  Surgeon: Yeison Petty MD;  Location:  [facility];  Service: Orthopedics;  Laterality: Left;    AMPUTATION FOOT Left 2022    Procedure: Transmetatarsal of Left Foot;  Surgeon: Yeison Petty MD;  Location:  [facility];  Service: Orthopedics;  Laterality: Left;    ENDOSCOPY N/A 3/14/2024    Procedure: ESOPHAGOGASTRODUODENOSCOPY WITH JEJUNAL TUBE INSERTION AT BEDSIDE;  Surgeon: Brunner, Mark I, MD;  Location:  [facility];  Service: Gastroenterology;  Laterality: N/A;    EYE SURGERY       Social History     Socioeconomic History    Marital status: Single   Tobacco Use    Smoking status: Former     Current packs/day: 0.00     Average packs/day: 1 pack/day for 40.0 years (40.0 ttl pk-yrs)     Types: Cigarettes     Start date: 1977     Quit date: 2017     Years since quittin.8    Smokeless tobacco: Never   Vaping Use    Vaping status: Never Used   Substance and Sexual Activity    Alcohol use: Not Currently     Comment: histgry of alcohol abuse    Drug use: Yes     Types: Marijuana, \"Crack\" cocaine     Comment: PATIENT STATES \"IT'S BEEN A FEW DAYS\"    Sexual activity: Defer     Family History   Problem Relation Age of Onset    Diabetes Mother     Hypertension Mother     Hypertension Father     Diabetes Father     Diabetes Sister     Hypertension Brother     Diabetes Brother     Diabetes Maternal Grandmother     Diabetes Maternal Grandfather     Hypertension Brother     Diabetes Brother        No Known Allergies    Current medication reviewed for route, type, dose and frequency " "and are current per MAR at time of dictation.    Palliative Performance Scale Score:  30%    /52 (BP Location: Left arm, Patient Position: Lying)   Pulse 70   Temp 99.9 °F (37.7 °C) (Oral)   Resp 20   Ht 182.9 cm (72\")   Wt 91 kg (200 lb 9.9 oz)   SpO2 96%   BMI 27.21 kg/m²     Intake/Output Summary (Last 24 hours) at 3/29/2024 0826  Last data filed at 3/29/2024 0800  Gross per 24 hour   Intake 3062 ml   Output 4000 ml   Net -938 ml       Physical Exam:    General Appearance:    Patient laying in bed, drowsy, awakens easily, A/C ill appearing, NAD   HEENT:    NC/AT, EOMI, anicteric, MMM, face relaxed, keofeed in place, NC in place   Neck:   supple, trachea midline, no JVD   Lungs:     CTA bilat, diminished in bases; respirations regular, even and unlabored; RR 16-18 on exam, 2LNC    Heart:    RRR, normal S1 and S2, no M/R/G, HR 67 on monitor   Abdomen:     Normal bowel sounds, soft, nontender, nondistended   G/U:   Deferred   MSK/Extremities:   Wasting, +1 BLE edema, no left metatarsals s/p amputation   Pulses:   Pulses palpable and equal bilaterally   Skin:   Warm, dry   Neurologic:   Drowsy, answers by shaking/nodding head, follow commands,    Psych:   Calm, appropriate, drowsy         Labs:   Results from last 7 days   Lab Units 03/29/24  0544   WBC 10*3/mm3 8.86   HEMOGLOBIN g/dL 8.7*   HEMATOCRIT % 27.9*   PLATELETS 10*3/mm3 400     Results from last 7 days   Lab Units 03/29/24  0544   SODIUM mmol/L 143   POTASSIUM mmol/L 5.4*   CHLORIDE mmol/L 108*   CO2 mmol/L 28.0   BUN mg/dL 50*   CREATININE mg/dL 1.12   GLUCOSE mg/dL 158*   CALCIUM mg/dL 8.4*     Results from last 7 days   Lab Units 03/29/24  0544 03/25/24  1524 03/25/24  0027   SODIUM mmol/L 143   < > 145   POTASSIUM mmol/L 5.4*   < > 3.9   CHLORIDE mmol/L 108*   < > 112*   CO2 mmol/L 28.0   < > 27.0   BUN mg/dL 50*   < > 40*   CREATININE mg/dL 1.12   < > 1.25   CALCIUM mg/dL 8.4*   < > 7.6*   BILIRUBIN mg/dL  --   --  <0.2   ALK PHOS U/L  -- "   --  333*   ALT (SGPT) U/L  --   --  380*   AST (SGOT) U/L  --   --  276*   GLUCOSE mg/dL 158*   < > 235*    < > = values in this interval not displayed.     Imaging Results (Last 72 Hours)       Procedure Component Value Units Date/Time    XR Chest 1 View [[id number]] Collected: 03/29/24 0515     Updated: 03/29/24 0519    Narrative:      XR CHEST 1 VW    Date of Exam: 3/29/2024 1:46 AM EDT    Indication: Respiratory failure    Comparison: 3/27/2024.    Findings:  Support hardware projects unchanged. Hazy bilateral airspace opacities persist suggesting mild edema pattern with trace effusion noted on the left. There is no new focal consolidation or distinct pneumothorax.      Impression:      Impression:  Support hardware projects unchanged. Hazy bilateral airspace opacities persist suggesting mild edema pattern with trace effusion noted on the left. There is no new focal consolidation or distinct pneumothorax.        Electronically Signed: Boom Rodriguez MD    3/29/2024 5:16 AM EDT    Workstation ID: [id number]    XR Chest 1 View [[id number]] Collected: 03/27/24 0801     Updated: 03/27/24 0812    Narrative:      XR CHEST 1 VW    Date of Exam: 3/27/2024 3:20 AM EDT    Indication: R/O Infiltrates    Comparison: 3/23/2024    Findings:  Left internal jugular central venous catheter and weighted feeding tube are again seen. Interval extubation. Cardiac size is similar to prior exam. Left basilar opacity with small left pleural effusion. Trace right pleural effusion. This appears slightly   improved from prior examination. No evidence of acute osseous abnormalities. Visualized upper abdomen is unremarkable.      Impression:      Impression:  Left basilar opacity with small left and trace right pleural effusion. This appears slightly improved from prior examination.      Electronically Signed: Anil Krishnamurthy MD    3/27/2024 8:09 AM EDT    Workstation ID: [id number]                  Diagnostics: Reviewed    A:   Acute type 1  respiratory failure    Essential hypertension    Dementia    Dysphagia    J15.0, ESBL Klebsiella pneumonia    Protein calorie malnutrition    Type 2 diabetes mellitus with diabetic chronic kidney disease    Acute kidney injury superimposed on chronic kidney disease     68 y.o. male with dementia, PNA, JAYY on CKD, dysphagia, respiratory failure.   S/S:   Dyspnea -currently on 2LNC  -daughter confirms re-intubate if respiratory decline  -she would not place Trach if unable to be weaned from MV    2. Dysphagia -daughter in agreement with PEG placement    3. Debility -PT/OT following    4. GOC -Full Code/Full Support -per discussion with daughter  -daughter is in agreement with return to [facility]  -long discussion regarding continued full treatment versus comfort focused plan of care  -long discussion regarding code status in light of serious illness    P: No family at bedside. Called and spoke to alen Friedman, at 10:42am for 20 minutes. Introduced Palliative Care and services. Long discussion regarding patient's functional status and previous health history and present condition. Reviewed code status in light of serious illness. Daughter would like patient re-intubated but would not consider trach to remain on MV long term. Daughter would like CPR. Discussed dysphagia and benefits/burdens of PEG, and daughter would like PEG placement. Emotional support provided throughout discussion. Will continue to follow for support and ongoing GOC discussion.   Thank you for this consult and allowing us to participate in patient's plan of care. Palliative Care Team will continue to follow patient. Please do not hesitate to contact us regarding further symptom management or goals of care needs.  Time: 60 minutes spent reviewing medical and medication records, assessing and examining patient, discussing with family, answering questions, providing some guidance about a plan and documentation of care, and coordinating care with  other healthcare members, with > 50% time spent face to face.         Leilani Pritchett, APRN  3/29/2024

## 2024-03-29 NOTE — THERAPY TREATMENT NOTE
Patient Name: Omkar Nava  : 1955    MRN: [id number]                              Today's Date: 3/29/2024       Admit Date: 3/10/2024    Visit Dx:     ICD-10-CM ICD-9-CM   1. Acute respiratory failure with hypoxia  J96.01 518.81   2. History of dementia  Z86.59 V11.8   3. Pneumonia due to infectious organism, unspecified laterality, unspecified part of lung  J18.9 486   4. Hypothermia, initial encounter  T68.XXXA 991.6   5. Anemia, unspecified type  D64.9 285.9   6. Hyperkalemia  E87.5 276.7   7. Encephalopathy  G93.40 348.30   8. Protein-calorie malnutrition, unspecified severity  E46 263.9   9. Iron deficiency anemia, unspecified iron deficiency anemia type  D50.9 280.9   10. 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, without long-term current use of insulin    Orthostatic hypotension    Acute osteomyelitis of left foot    Type 2 diabetes mellitus    Essential hypertension    Psychotic disorder    Neurocognitive disorder    S/P transmetatarsal amputation of foot, left    Hypoxia    Abscess of left foot    Anemia of chronic disease    Aspiration pneumonia    Cellulitis of left toe    Cellulitis of lower extremity    Cervical spine pain    Chronic kidney disease    Closed head injury without loss of consciousness    Dementia    Dental cavities    Diarrhea of presumed infectious origin    Displaced fracture of proximal phalanx of left great toe, initial encounter for open fracture    Dysphagia    Erectile dysfunction    Fall    Gas gangrene    Generalized muscle weakness    Hallucinations    Hypertensive heart and chronic kidney disease 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 failure, with stage 1 through stage 4 chronic kidney disease, or unspecified chronic kidney disease    Insomnia due to medical condition    Iron deficiency  anemia    J15.0, ESBL Klebsiella pneumonia    Laceration without foreign body, left foot, subsequent encounter    Long term (current) use of insulin    Other acute osteomyelitis, left ankle and foot    Personal history of Methicillin resistant Staphylococcus aureus infection    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    Protein calorie malnutrition    Simple chronic bronchitis    Tobacco use    Type 2 diabetes mellitus with diabetic neuropathy, unspecified    Wound infection, posttraumatic    Type 2 diabetes mellitus with diabetic chronic kidney disease    Encephalopathy    Bilateral pneumonia    Acute kidney injury superimposed on chronic kidney disease    Acute type 1 respiratory failure     Past Medical History:   Diagnosis Date    Anemia     Dementia     Diabetes mellitus     Dysphagia     GERD (gastroesophageal reflux disease)     History of alcohol abuse     History of cocaine use     History of marijuana use     Hypertension     Osteomyelitis     Poor historian     records obtained from nursing home records & his family    Visual impairment      Past Surgical History:   Procedure Laterality Date    AMPUTATION FOOT Left 10/18/2022    Procedure: PARTIAL FIRST RAY AMPUTATION LEFT;  Surgeon: Yeison Petty MD;  Location:  [facility] OR;  Service: Orthopedics;  Laterality: Left;    AMPUTATION FOOT Left 12/5/2022    Procedure: Transmetatarsal of Left Foot;  Surgeon: Yeison Petty MD;  Location:  [facility] OR;  Service: Orthopedics;  Laterality: Left;    ENDOSCOPY N/A 3/14/2024    Procedure: ESOPHAGOGASTRODUODENOSCOPY WITH JEJUNAL TUBE INSERTION AT BEDSIDE;  Surgeon: Brunner, Mark I, MD;  Location:  [facility] ENDOSCOPY;  Service: Gastroenterology;  Laterality: N/A;    EYE SURGERY        General Information       Row Name 03/29/24 1313          OT Time and Intention    Document Type therapy note (daily note)  -KF     Mode of Treatment occupational therapy;co-treatment  -KF       Row Name 03/29/24 9902          General Information    Patient  Profile Reviewed yes  -KF     Existing Precautions/Restrictions fall;oxygen therapy device and L/min;other (see comments)  NG. Remote L transmetatarsal amputation.  -KF     Barriers to Rehab medically complex;cognitive status;visual deficit  -KF       Row Name 03/29/24 1316          Cognition    Orientation Status (Cognition) oriented to;person;disoriented to;place;time  -KF       Row Name 03/29/24 1316          Safety Issues, Functional Mobility    Safety Issues Affecting Function (Mobility) awareness of need for assistance;insight into deficits/self-awareness;safety precaution awareness;safety precautions follow-through/compliance;sequencing abilities  -KF     Impairments Affecting Function (Mobility) balance;cognition;coordination;endurance/activity tolerance;motor control;pain;postural/trunk control;range of motion (ROM);visual/perceptual;strength  -KF     Cognitive Impairments, Mobility Safety/Performance attention  -KF               User Key  (r) = Recorded By, (t) = Taken By, (c) = Cosigned By      Initials Name Provider Type    KF Natalie Zambrano OT Occupational Therapist                     Mobility/ADL's       Row Name 03/29/24 1317          Bed Mobility    Bed Mobility rolling left;rolling right  -KF     Rolling Left [place] (Bed Mobility) maximum assist (25% patient effort);2 person assist;verbal cues;nonverbal cues (demo/gesture)  -KF     Rolling Right [place] (Bed Mobility) maximum assist (25% patient effort);2 person assist;verbal cues;nonverbal cues (demo/gesture)  -KF     Comment, (Bed Mobility) Pt assisted in left/right rolling with maxA x2 for lift sling placement. Pt able to assist in holding bed rails once assisted to sidelying position.  -KF       Row Name 03/29/24 1317          Transfers    Transfers bed-chair transfer  -       Row Name 03/29/24 1317          Bed-Chair Transfer    Bed-Chair [place] (Transfers) dependent (less than 25% patient effort);2 person assist;verbal  cues;nonverbal cues (demo/gesture)  -     Assistive Device (Bed-Chair Transfers) lift device  -     Comment, (Bed-Chair Transfer) Pt dependently transferred to chair via mechanical lift.  -       Row Name 03/29/24 1317          Activities of Daily Living    BADL Assessment/Intervention lower body dressing  -       Row Name 03/29/24 1317          Lower Body Dressing Assessment/Training    [place] Level (Lower Body Dressing) don;socks;dependent (less than 25% patient effort)  -     Position (Lower Body Dressing) supine  -               User Key  (r) = Recorded By, (t) = Taken By, (c) = Cosigned By      Initials Name Provider Type    KF Natalie Zambrano OT Occupational Therapist                   Obj/Interventions       Row Name 03/29/24 1341          Shoulder (Therapeutic Exercise)    Shoulder (Therapeutic Exercise) AAROM (active assistive range of motion)  -     Shoulder AAROM (Therapeutic Exercise) bilateral;flexion;extension;sitting;10 repetitions  -       Row Name 03/29/24 1341          Elbow/Forearm (Therapeutic Exercise)    Elbow/Forearm (Therapeutic Exercise) AAROM (active assistive range of motion)  -     Elbow/Forearm AAROM (Therapeutic Exercise) bilateral;flexion;extension;sitting;10 repetitions  -       Row Name 03/29/24 1341          Wrist (Therapeutic Exercise)    Wrist (Therapeutic Exercise) AAROM (active assistive range of motion)  -     Wrist AAROM (Therapeutic Exercise) bilateral;flexion;extension;10 repetitions  -       Row Name 03/29/24 1341          Motor Skills    Therapeutic Exercise shoulder;elbow/forearm;wrist  -       Row Name 03/29/24 1341          Balance    Balance Assessment sitting static balance;sitting dynamic balance  -     Static Sitting Balance contact guard  -     Dynamic Sitting Balance minimal assist  -KF     Position, Sitting Balance unsupported;sitting in chair  -     Balance Interventions sitting;static;dynamic  -               User Key   (r) = Recorded By, (t) = Taken By, (c) = Cosigned By      Initials Name Provider Type    KF Natalie Zambrano, SHIRAZ Occupational Therapist                   Goals/Plan    No documentation.                  Clinical Impression       Row Name 03/29/24 1342          Pain Assessment    Pretreatment Pain Rating 0/10 - no pain  -KF     Posttreatment Pain Rating 0/10 - no pain  -KF     Pain Intervention(s) Repositioned;Ambulation/increased activity  -       Row Name 03/29/24 1342          Plan of Care Review    Plan of Care Reviewed With patient  -KF     Progress no change  -KF     Outcome Evaluation Pt continues to require maxA x2, near dependence to perform bed mobility. Pt dependently tranferred to chair today using mechanical lift. Pt tolerated BUE AAROM well, however requires frequent cues to maintain attention and alertness. The pt remains below his functional baseline with generalized weakness, decreased activity tolerance, and balance deficits warranting continued IP OT services. Recommend a d/c to SNF for best outcome.  -       Row Name 03/29/24 1342          Therapy Assessment/Plan (OT)    Rehab Potential (OT) fair, will monitor progress closely  -     Criteria for Skilled Therapeutic Interventions Met (OT) yes;skilled treatment is necessary  -KF     Therapy Frequency (OT) daily  -       Row Name 03/29/24 1342          Therapy Plan Review/Discharge Plan (OT)    Anticipated Discharge Disposition (OT) skilled nursing facility  -       Row Name 03/29/24 1342          Vital Signs    Pre Systolic BP Rehab 136  -KF     Pre Treatment Diastolic BP 67  -KF     Post Systolic BP Rehab 131  -KF     Post Treatment Diastolic BP 60  -KF     Pretreatment Heart Rate (beats/min) 66  -KF     Posttreatment Heart Rate (beats/min) 67  -KF     Pre SpO2 (%) 96  -KF     O2 Delivery Pre Treatment nasal cannula  -KF     Post SpO2 (%) 98  -KF     O2 Delivery Post Treatment nasal cannula  -KF     Pre Patient Position Supine  -KF      Intra Patient Position Sitting  -KF     Post Patient Position Sitting  -KF       Row Name 03/29/24 1342          Positioning and Restraints    Pre-Treatment Position in bed  -KF     Post Treatment Position chair  -KF     In Chair notified nsg;reclined;call light within reach;encouraged to call for assist;exit alarm on;compression device;waffle cushion;on mechanical lift sling;legs elevated;RUE elevated;LUE elevated  -KF               User Key  (r) = Recorded By, (t) = Taken By, (c) = Cosigned By      Initials Name Provider Type    KF Natalie Zambrano, OT Occupational Therapist                   Outcome Measures       Row Name 03/29/24 1344          How much help from another is currently needed...    Putting on and taking off regular lower body clothing? 1  -KF     Bathing (including washing, rinsing, and drying) 1  -KF     Toileting (which includes using toilet bed pan or urinal) 1  -KF     Putting on and taking off regular upper body clothing 1  -KF     Taking care of personal grooming (such as brushing teeth) 1  -KF     Eating meals 1  -KF     AM-PAC 6 Clicks Score (OT) 6  -KF       Row Name 03/29/24 1211          How much help from another person do you currently need...    Turning from your back to your side while in flat bed without using bedrails? 2  -AY     Moving from lying on back to sitting on the side of a flat bed without bedrails? 2  -AY     Moving to and from a bed to a chair (including a wheelchair)? 1  -AY     Standing up from a chair using your arms (e.g., wheelchair, bedside chair)? 1  -AY     Climbing 3-5 steps with a railing? 1  -AY     To walk in hospital room? 1  -AY     AM-PAC 6 Clicks Score (PT) 8  -AY     Highest Level of Mobility Goal 3 --> Sit at edge of bed  -AY       Row Name 03/29/24 1344 03/29/24 1211       Functional Assessment    Outcome Measure Options AM-PAC 6 Clicks Daily Activity (OT)  -KF AM-PAC 6 Clicks Basic Mobility (PT)  -AY              User Key  (r) = Recorded By, (t) =  Taken By, (c) = Cosigned By      Initials Name Provider Type    Delia Cazares, PT Physical Therapist    KF Natalie Zambrano OT Occupational Therapist                    Occupational Therapy Education       Title: PT OT SLP Therapies (In Progress)       Topic: Occupational Therapy (In Progress)       Point: ADL training (In Progress)       Description:   Instruct learner(s) on proper safety adaptation and remediation techniques during self care or transfers.   Instruct in proper use of assistive devices.                  Learning Progress Summary             Patient Acceptance, E,TB, NR by KF at 3/27/2024 1457    Acceptance, E, NR by CS at 3/25/2024 1139   Family Acceptance, E, NR by RL at 3/16/2024 0108                         Point: Home exercise program (In Progress)       Description:   Instruct learner(s) on appropriate technique for monitoring, assisting and/or progressing therapeutic exercises/activities.                  Learning Progress Summary             Patient Acceptance, E,TB, NR by KF at 3/29/2024 0959    Acceptance, E,TB, NR by KF at 3/27/2024 1457   Family Acceptance, E, NR by RL at 3/16/2024 0108                         Point: Precautions (In Progress)       Description:   Instruct learner(s) on prescribed precautions during self-care and functional transfers.                  Learning Progress Summary             Patient Acceptance, E,TB, NR by KF at 3/29/2024 0959    Acceptance, E,TB, NR by KF at 3/27/2024 1457    Acceptance, E, NR by CS at 3/25/2024 1139   Family Acceptance, E, NR by RL at 3/16/2024 0108                         Point: Body mechanics (In Progress)       Description:   Instruct learner(s) on proper positioning and spine alignment during self-care, functional mobility activities and/or exercises.                  Learning Progress Summary             Patient Acceptance, E,TB, NR by KF at 3/29/2024 0959    Acceptance, E,TB, NR by KF at 3/27/2024 1457    Acceptance, E, NR by CS at  3/25/2024 1139   Family Acceptance, E, NR by RL at 3/16/2024 0108                                         User Key       Initials Effective Dates Name Provider Type Discipline    RL 06/16/21 -  Beth Land RN Registered Nurse Nurse     09/02/21 -  Sarah Jade OT Occupational Therapist OT    KF 08/09/23 -  Natalie Zambrano OT Occupational Therapist OT                  OT Recommendation and Plan  Therapy Frequency (OT): daily  Plan of Care Review  Plan of Care Reviewed With: patient  Progress: no change  Outcome Evaluation: Pt continues to require maxA x2, near dependence to perform bed mobility. Pt dependently tranferred to chair today using mechanical lift. Pt tolerated BUE AAROM well, however requires frequent cues to maintain attention and alertness. The pt remains below his functional baseline with generalized weakness, decreased activity tolerance, and balance deficits warranting continued IP OT services. Recommend a d/c to SNF for best outcome.     Time Calculation:         Time Calculation- OT       Row Name 03/29/24 1345             Time Calculation- OT    OT Start Time 0959  -KF      OT Received On 03/29/24  -KF      OT Goal Re-Cert Due Date 04/04/24  -KF         Timed Charges    24048 - OT Therapeutic Exercise Minutes 5  -KF      41271 - OT Therapeutic Activity Minutes 6  -KF         Total Minutes    Timed Charges Total Minutes 11  -KF       Total Minutes 11  -KF                User Key  (r) = Recorded By, (t) = Taken By, (c) = Cosigned By      Initials Name Provider Type    KF Natalie Zambrano OT Occupational Therapist                  Therapy Charges for Today       Code Description Service Date Service Provider Modifiers Qty    [id number] HC OT THERAPEUTIC ACT EA 15 MIN 3/29/2024 Natalie Zambrano OT GO 1                 Natalie Zambrano OT  3/29/2024

## 2024-03-29 NOTE — PLAN OF CARE
Goal Outcome Evaluation:  Plan of Care Reviewed With: patient        Progress: no change         Anticipated Discharge Disposition (SLP): extended care facility             Treatment Assessment (SLP): continued, aspiration, severe, pharyngeal dysphagia, suspected (03/29/24 1010)     Plan for Continued Treatment (SLP): continue treatment per plan of care (03/29/24 1010)

## 2024-03-29 NOTE — PLAN OF CARE
Problem: Skin Injury Risk Increased  Goal: Skin Health and Integrity  3/29/2024 0643 by Jf Costa, RN  Outcome: Ongoing, Progressing  3/29/2024 0641 by Jf Costa RN  Outcome: Ongoing, Progressing     Problem: Fall Injury Risk  Goal: Absence of Fall and Fall-Related Injury  3/29/2024 0643 by Jf Costa, RN  Outcome: Ongoing, Progressing  3/29/2024 0641 by Jf Costa RN  Outcome: Ongoing, Progressing   Goal Outcome Evaluation:           Progress: improving

## 2024-03-29 NOTE — PROGRESS NOTES
INTENSIVIST   PROGRESS NOTE     Hospital:  LOS: 19 days     Mr. Omkar Nava, 68 y.o. male is followed for a Chief Complaint of: Respiratory failure, pneumonia      Subjective   S     Interval History:  Extubated x  5 days.    Open eyes.    Temp  Min: 97.7 °F (36.5 °C)  Max: 99.9 °F (37.7 °C)      The patient's relevant past medical, surgical and social history were reviewed and updated in Epic as appropriate.        History     Last Reviewed by Emmy Castellanos, PT on 3/25/2024 at 10:35 AM    Sections Reviewed    Medical, Surgical, Family, Tobacco, Custom, Alcohol, Drug Use, Sexual   Activity    Problem list reviewed by Salo Arellano MD on 3/23/2024 at  1:36 PM  Medicines reviewed by Salo Arellano MD on 3/23/2024 at  1:36 PM  Allergies reviewed by Salo Arellano MD on 3/23/2024 at  1:36 PM          OBJECTIVE     Vitals:  Temp: 97.9 °F (36.6 °C) (24 1200) Temp  Min: 97.7 °F (36.5 °C)  Max: 99.9 °F (37.7 °C)   Temp core:      BP: 135/78 (24 1410) BP  Min: 126/51  Max: 151/69   MAP (non-invasive) Noninvasive MAP (mmHg): 98 (24 1410) Noninvasive MAP (mmHg)  Av.4  Min: 28  Max: 138   Pulse: 68 (24 1410) Pulse  Min: 64  Max: 72   Resp: 20 (24 1410) Resp  Min: 18  Max: 22   SpO2: 96 % (24 1410) SpO2  Min: 94 %  Max: 98 %   Device: humidified, nasal cannula (24 141)    Flow Rate: 4 (24 1410) Flow (L/min)  Min: 2  Max: 4         24  0600 24  06   Weight: 91.5 kg (201 lb 11.5 oz) 91 kg (200 lb 9.9 oz)        Intake/Ouptut 24 hrs (7:00AM - 6:59 AM)  Intake & Output (last 2 days)          07 07 07 07 07    I.V. (mL/kg)       Blood  757     Other 525 350 120    NG/GT 1901 1887 301    IV Piggyback       Total Intake(mL/kg) 2426 (26.5) 2994 (32.9) 421 (4.6)    Urine (mL/kg/hr) 1500 (0.7) 3600 (1.6) 650 (0.8)    Stool 700 400     Total Output 2200 4000 650    Net +226 -1006 -229           Stool Unmeasured  Occurrence  1 x             Physical Examination  Telemetry:  Rhythm: normal sinus rhythm (03/29/24 1200)         Constitutional:  No acute distress.   Cardiovascular: RRR.    Respiratory: Normal breath sounds  (+) Rhonchi   Abdominal:  Soft with no tenderness.   Extremities: Edema   Neurological:   Awake.  Best Eye Response: 4-->(E4) spontaneous (03/29/24 1200)  Best Motor Response: 6-->(M6) obeys commands (03/29/24 1200)  Best Verbal Response: 4-->(V4) confused (03/29/24 1200)  Crescencio Coma Scale Score: 14 (03/29/24 1200)     Results Reviewed:  Laboratory  Microbiology  Radiology  Pathology    Hematology:  Results from last 7 days   Lab Units 03/29/24  1216 03/29/24  0544 03/28/24  1845 03/28/24 0643 03/28/24 0525 03/27/24 0419   WBC 10*3/mm3  --  8.86  --   --  8.29 7.80   HEMOGLOBIN g/dL 8.8* 8.7* 9.0*   < > 6.8* 7.4*   MCV fL  --  90.3  --   --  91.5 91.4   PLATELETS 10*3/mm3  --  400  --   --  454* 494*    < > = values in this interval not displayed.     Results from last 7 days   Lab Units 03/29/24 0544 03/28/24 0525 03/27/24 0419   NEUTROS ABS 10*3/mm3 6.87 6.09 5.85   LYMPHS ABS 10*3/mm3 1.06 1.21 1.01   EOS ABS 10*3/mm3 0.35 0.32 0.30     Chemistry:  Estimated Creatinine Clearance: 77.9 mL/min (by C-G formula based on SCr of 1.2 mg/dL).    Results from last 7 days   Lab Units 03/29/24  1216 03/29/24  0544   SODIUM mmol/L 145 143   POTASSIUM mmol/L 5.0 5.4*   CHLORIDE mmol/L 109* 108*   CO2 mmol/L 30.0* 28.0   BUN mg/dL 50* 50*   CREATININE mg/dL 1.20 1.12   GLUCOSE mg/dL 134* 158*     Results from last 7 days   Lab Units 03/29/24  1216 03/29/24 0544 03/28/24 0525 03/27/24  0419   CALCIUM mg/dL 8.9 8.4*   < > 8.2*   MAGNESIUM mg/dL  --  2.4  --  2.2   PHOSPHORUS mg/dL  --  4.1  --  2.9    < > = values in this interval not displayed.     COVID-19  Lab Results   Component Value Date    COVID19 Not Detected 03/10/2024    COVID19 Not Detected 03/10/2024    COVID19 Not Detected 02/05/2024    COVID19 Not  Detected 02/05/2024       Images:  XR Chest 1 View    Result Date: 3/29/2024  Impression: Support hardware projects unchanged. Hazy bilateral airspace opacities persist suggesting mild edema pattern with trace effusion noted on the left. There is no new focal consolidation or distinct pneumothorax. Electronically Signed: Boom Rodriguez MD  3/29/2024 5:16 AM EDT  Workstation ID: [id number]     Echo:  Results for orders placed during the hospital encounter of 01/15/24    Adult Transthoracic Echo Complete With Contrast if Necessary Per Protocol    Interpretation Summary    Left ventricular ejection fraction appears to be 56 - 60%.    Left ventricular wall thickness is consistent with hypertrophy.    Estimated right ventricular systolic pressure from tricuspid regurgitation is mildly elevated (35-45 mmHg).    There is a small (1-2cm) pericardial effusion.    No evidence for pericardial tamponade      Results: Reviewed.  I reviewed the patient's new laboratory and imaging results.  I independently reviewed the patient's new images.    Medications: Reviewed.    Assessment   A/P     Hospital:  LOS: 19 days   ICU: 19d 3h     Active Hospital Problems    Diagnosis  POA    **Acute type 1 respiratory failure [J96.01]  Yes    Acute kidney injury superimposed on chronic kidney disease [N17.9, N18.9]  Yes    Dysphagia [R13.10]  Yes    Dementia [F03.90]  Yes    Protein calorie malnutrition [E46]  Yes    J15.0, ESBL Klebsiella pneumonia [J15.0]  Yes    Essential hypertension [I10]  Yes    Type 2 diabetes mellitus with diabetic chronic kidney disease [E11.22]  Yes     Omkar is a 68 y.o. male admitted on 3/10/2024 with Healthcare-associated pneumonia [J18.9]  Acute respiratory failure with hypoxemia [J96.01]    Omkar Nava is a 68 y.o. male dependent for mobility, bathing and dressing, who is now admitted for his third admission in the last 2.5 months for an aspiration event with aspiration pneumonia.      On his previous admission a  modified barium swallow did reveal moderate oral and mild pharyngeal dysphagia February 5, 2024.    Respiratory status deteriorated overnight on March 10 and he required intubation.    CTA of the chest was repeated and was negative for PE but revealed worsening bilateral pneumonia.      Assessment/Management/Treatment Plan:    Respiratory Failure  Intubated.  Invasive Mechanical Ventilation   Extubated 03/24/24  ESBL Klebsiella pneumonia - Ertapenem x 10 days total - Completed on 03/25/24  Cardiovascular  HFpEF  HTN  Neuro  Alzheimer's Dementia  Psychosis  Dysphagia  Renal  JAYY/CKD  Hematology  Chronic NC anemia, Iron deficiency  Anemia ABL. Hb down to 6.8. PRBCs x 2 on 03/28/24   Nutrition Support     NG/OG Tube Nasojejunal 12 Fr Right nostril-Tube Feeding Product: Isosource 1.5  Novasource Renal (2.0 kcal/mL, 91 g protein/L, no fiber)  [REMOVED] NG/OG Tube 16 Fr Left nostril-Tube Feeding Rate (mL/hr): 0 mL/HR  NG/OG Tube Nasojejunal 12 Fr Right nostril-Tube Feeding Rate (mL/hr): 47 mL/HR (Based on a feeding duration of 20 h/d)      Lab Results   Component Value Date    PREALBUMIN 13.8 (L) 03/25/2024    CRP 4.73 (H) 03/25/2024    CRP 0.59 (H) 08/02/2023    CRP 0.30 03/06/2023    CRP 0.95 (H) 12/07/2022     Endocrine   Body mass index is 27.21 kg/m². Overweight: 25.0-29.9kg/m2   Type 2 diabetes.  Complicated by blindness.     Lab Results   Lab Value Date/Time    HGBA1C 7.00 (H) 10/16/2022 0432    HGBA1C 8.7 (H) 06/25/2022 0242    HGBA1C 13.4 04/14/2022 1058     Results from last 7 days   Lab Units 03/29/24  1123 03/29/24  0523 03/28/24  2343 03/28/24  1720 03/28/24  1121 03/28/24  0517 03/27/24  2327 03/27/24  1744   GLUCOSE mg/dL 124 144* 185* 174* 193* 162* 200* 181*       Diet: NPO Diet NPO Type: Strict NPO   Advance Directives: Code Status and Medical Interventions:   Ordered at: 03/10/24 130     Code Status (Patient has no pulse and is not breathing):    CPR (Attempt to Resuscitate)     Medical Interventions  "(Patient has pulse or is breathing):    Full Support        DVT prophylaxis:  Medical and mechanical DVT prophylaxis orders are present.         In brief:  Continue Enteral Nutrition  Per SLT: \"Long term alternative method of nutrition pending GOC/POC\"  We don't expect dysphagia to improve any time soon.  Certainly with dementia benefit of PEG is limited.  Daughter is agreeable with PEG.  We will ask GI to se see.  We will also ask Palliative to see him and address issues with daughter including GOC and care after discharge from the Hospital.  Discussed with Dr. Brunner, they are going to re-assess on Monday 04/01/24 indications and timing of PEG, along with recommendations from the Palliative Team.  Due to elevated K, today, low K formula.  Tomorrow if K < 5.0 may resume Isosource 1.5 at 63 ml/h  CRP and PAB on Monday 04/01/24  Goal: Glucose < 180 mg/dL.   Insulin SQ - No changes in current doses.  Discussed with uday who is the POA 03/26/24  Even though he is off the Invasive Mechanical Ventilation, he is not better, he is having increased amount of oral secretions, which may lead to his re-intubation in the near future. He remains weak. With his underlying Alzheimer's disease, which he has had for 2-2.5 years, he may have suffered a further decline in his overall condition, as it is expected under these circumstanced.  She states she is in agreement with Re-Intubation if his condition declines. She also wants CPR in case of cardiac/respiratory arrest. However, if his back on the Invasive Mechanical Ventilation and not able to be liberated, she does not think a Trach would be beneficial with his underlying dementia. But she does not want to make any decisions yet, and see how his clinical condition evolves.   Disposition: Keep in ICU.  CM. Eventually he will need placement. Family does not want him back at [facility].    Plan of care and goals reviewed during interdisciplinary rounds.  I discussed the " patient's findings and my recommendations with family and nursing staff    MDM:    Problem(s) High due to: Acute or Chronic illness or injury that may poses a threat to life or bodily function  Data: High due to: Review of prior external records from each unique source, Review of the result(s) of each unique test, Ordering of each unique test, and Discuss management or test interpretation with external physician or NPP (not separately reported)    High    [x] Primary Attending Intensive Care Medicine - Nutrition Support   [] Consultant    Copied text in this note has been reviewed and is accurate as of 03/29/24

## 2024-03-29 NOTE — THERAPY TREATMENT NOTE
Acute Care - Speech Language Pathology   Swallow Treatment Note [facility]     Patient Name: Omkar Nava  : 1955  MRN: [id number]  Today's Date: 3/29/2024               Admit Date: 3/10/2024    Visit Dx:     ICD-10-CM ICD-9-CM   1. Acute respiratory failure with hypoxia  J96.01 518.81   2. History of dementia  Z86.59 V11.8   3. Pneumonia due to infectious organism, unspecified laterality, unspecified part of lung  J18.9 486   4. Hypothermia, initial encounter  T68.XXXA 991.6   5. Anemia, unspecified type  D64.9 285.9   6. Hyperkalemia  E87.5 276.7   7. Encephalopathy  G93.40 348.30   8. Protein-calorie malnutrition, unspecified severity  E46 263.9   9. Iron deficiency anemia, unspecified iron deficiency anemia type  D50.9 280.9   10. 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, without long-term current use of insulin    Orthostatic hypotension    Acute osteomyelitis of left foot    Type 2 diabetes mellitus    Essential hypertension    Psychotic disorder    Neurocognitive disorder    S/P transmetatarsal amputation of foot, left    Hypoxia    Abscess of left foot    Anemia of chronic disease    Aspiration pneumonia    Cellulitis of left toe    Cellulitis of lower extremity    Cervical spine pain    Chronic kidney disease    Closed head injury without loss of consciousness    Dementia    Dental cavities    Diarrhea of presumed infectious origin    Displaced fracture of proximal phalanx of left great toe, initial encounter for open fracture    Dysphagia    Erectile dysfunction    Fall    Gas gangrene    Generalized muscle weakness    Hallucinations    Hypertensive heart and chronic kidney disease 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 failure, with stage 1 through stage 4 chronic kidney disease, or unspecified chronic kidney disease     Insomnia due to medical condition    Iron deficiency anemia    J15.0, ESBL Klebsiella pneumonia    Laceration without foreign body, left foot, subsequent encounter    Long term (current) use of insulin    Other acute osteomyelitis, left ankle and foot    Personal history of Methicillin resistant Staphylococcus aureus infection    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    Protein calorie malnutrition    Simple chronic bronchitis    Tobacco use    Type 2 diabetes mellitus with diabetic neuropathy, unspecified    Wound infection, posttraumatic    Type 2 diabetes mellitus with diabetic chronic kidney disease    Encephalopathy    Bilateral pneumonia    Acute kidney injury superimposed on chronic kidney disease    Acute type 1 respiratory failure     Past Medical History:   Diagnosis Date    Anemia     Dementia     Diabetes mellitus     Dysphagia     GERD (gastroesophageal reflux disease)     History of alcohol abuse     History of cocaine use     History of marijuana use     Hypertension     Osteomyelitis     Poor historian     records obtained from nursing home records & his family    Visual impairment      Past Surgical History:   Procedure Laterality Date    AMPUTATION FOOT Left 10/18/2022    Procedure: PARTIAL FIRST RAY AMPUTATION LEFT;  Surgeon: Yeison Petty MD;  Location:  [facility];  Service: Orthopedics;  Laterality: Left;    AMPUTATION FOOT Left 12/5/2022    Procedure: Transmetatarsal of Left Foot;  Surgeon: Yeison Petty MD;  Location:  [facility];  Service: Orthopedics;  Laterality: Left;    ENDOSCOPY N/A 3/14/2024    Procedure: ESOPHAGOGASTRODUODENOSCOPY WITH JEJUNAL TUBE INSERTION AT BEDSIDE;  Surgeon: Brunner, Mark I, MD;  Location:  [facility];  Service: Gastroenterology;  Laterality: N/A;    EYE SURGERY         SLP Recommendation and Plan     SLP Diet Recommendation: NPO, temporary alternate methods of nutrition/hydration (03/29/24 1010)                    Anticipated Discharge Disposition (SLP): extended  care facility (03/29/24 1010)                    Daily Summary of Progress (SLP): progress toward functional goals is gradual (03/29/24 1010)               Treatment Assessment (SLP): continued, aspiration, severe, pharyngeal dysphagia, suspected (03/29/24 1010)     Plan for Continued Treatment (SLP): continue treatment per plan of care (03/29/24 1010)         Plan of Care Reviewed With: patient  Progress: no change      SWALLOW EVALUATION (Last 72 Hours)       SLP Adult Swallow Evaluation       Row Name 03/29/24 1010 03/28/24 1000 03/27/24 1000             Rehab Evaluation    Document Type therapy note (daily note)  - therapy note (daily note)  -CJ --      Subjective Information no complaints  - no complaints  -CJ --      Patient Observations lethargic;cooperative  - cooperative;lethargic  -CJ --      Patient/Family/Caregiver Comments/Observations No family present.  - no family present  -CJ --      Patient Effort poor  -AC fair  -CJ --      Symptoms Noted During/After Treatment -- none  -CJ --      Oral Care teeth brushed - suction toothbrush;oral rinse provided;suction provided;lip/mouth moisturizer applied  - suction provided;tongue brushed;patient refused intervention  refused further intervention  -CJ --         Pain    Additional Documentation -- Pain Scale: FACES Pre/Post-Treatment (Group)  -CJ --         Pain Scale: FACES Pre/Post-Treatment    Pain: FACES Scale, Pretreatment 0-->no hurt  -AC 0-->no hurt  -CJ 0-->no hurt  -CH      Posttreatment Pain Rating 0-->no hurt  -AC 0-->no hurt  -CJ 0-->no hurt  -CH         Oral Motor Structure and Function    Dentition Assessment -- -- missing teeth;teeth are in poor condition  -CH      Secretion Management -- -- wet vocal quality;requires suctioning to control secretions  -CH      Mucosal Quality -- -- dry;sticky  -CH      Volitional Swallow -- -- unable to elicit  -CH      Volitional Cough -- -- unable to elicit  -CH         Oral Musculature and Cranial  Nerve Assessment    Oral Motor General Assessment -- -- generalized oral motor weakness  -CH      Oral Labial or Buccal Impairment, Detail, Cranial Nerve VII (Facial): -- -- reduced strength bilaterally  -CH      Lingual Impairment, Detail. Cranial Nerves IX, XII (Glossopharyngeal and Hypoglossal) -- -- reduced lingual ROM;reduced strength;bilaterally  -CH      Vocal Impairment, Detail. Cranial Nerve X (Vagus) -- -- vocal quality abnormality (see comments);other (see comments)  nearly aphonic  -CH         General Eating/Swallowing Observations    Respiratory Support Currently in Use -- -- nasal cannula  -CH      O2 Liters -- -- 2L  -CH      Eating/Swallowing Skills -- -- fed by SLP  -CH      Positioning During Eating -- -- upright 90 degree;upright in bed  -CH      Utensils Used -- -- spoon  -CH      Consistencies Trialed -- -- ice chips;pureed  -CH      Pre SpO2 (%) -- -- 96  -CH      Post SpO2 (%) -- -- 93  -CH         Clinical Swallow Eval    Oral Prep Phase -- -- impaired  -CH      Oral Transit -- -- impaired  -CH      Oral Residue -- -- impaired  -CH      Pharyngeal Phase -- -- suspected pharyngeal impairment  -CH      Clinical Swallow Evaluation Summary -- -- Pt aphonic w/ poor secretion mgt. Wet vocal quality/respirations at baseline that pt was u/a to clear w/ cues/suctioning. Pt had difficulty initiating swallow with ice and pureed trials. Swabbed out manually with toothette. DNT further PO trials 2' severity of s/sxs aspiration. Suspect aspiration of secretions.  -         Oral Prep Concerns    Oral Prep Concerns -- -- inefficient mastication;incomplete or weak lip closure around spoon;anterior loss;incomplete bolus preparation;bolus removed from mouth manually  -CH      Inefficient Mastication -- -- other (see comments)  ice and pureed  -CH      Incomplete or Weak Lip Closure Around Spoon -- -- all consistencies  -CH      Anterior Loss -- -- thin  -CH      Incomplete Bolus Preparation -- -- all  consistencies  -CH      Bolus Removed from Mouth Manually -- -- all consistencies  -CH         Oral Transit Concerns    Oral Transit Concerns -- -- unable to initiate oral transit  -CH         Oral Residue Concerns    Oral Residue Concerns -- -- diffuse residue throughout oral cavity  -CH      Diffuse Residue Throughout Oral Cavity -- -- all consistencies  -CH         Pharyngeal Phase Concerns    Pharyngeal Phase Concerns -- -- wet vocal quality;other (see comments)  unable to initiate swallow  -CH      Wet Vocal Quality -- -- all consistencies  -CH         SLP Evaluation Clinical Impression    SLP Swallowing Diagnosis -- -- suspected pharyngeal dysphagia;other (see comments)  suspect severe  -CH      Functional Impact -- -- risk of aspiration/pneumonia  -CH      Rehab Potential/Prognosis, Swallowing -- -- re-evaluate goals as necessary  -CH      Swallow Criteria for Skilled Therapeutic Interventions Met -- -- demonstrates skilled criteria  -CH         SLP Treatment Clinical Impressions    Treatment Assessment (SLP) continued;aspiration;severe;pharyngeal dysphagia;suspected  -AC continued;suspected;aspiration;pharyngeal dysphagia  -CJ --      Treatment Assessment Comments (SLP) -- Pt seen for tx this date. Pt continues w/ lethargy impacting ability to participate. Limited manipulation w/ trials of ice chips, tongue thrusting w/ all po presentations. Pharyngeal swallow only elicited in 1/5 attempts and noted audible upper airway secretions, unable to clear despite max cues. Further po presentations deferred 2/2 lethargy, overt s/s of aspiration, and poor secretion management. Pt is not appropriate for instrumental evaluation (FEES/MBS) 2/2 inconsistent swallow initiation. Safest continues to be NPO w/ long term alternative method of nutrition pending further GOC/POC.  -CJ --      Daily Summary of Progress (SLP) progress toward functional goals is gradual  -AC progress toward functional goals is gradual  -CJ --       Barriers to Overall Progress (SLP) Lethargy;Cognitive status;Baseline deficits;Medically complex  - Lethargy;Cognitive status;Baseline deficits;Medically complex  - --      Plan for Continued Treatment (SLP) continue treatment per plan of care  - continue treatment per plan of care  - --      Care Plan Review evaluation/treatment results reviewed;care plan/treatment goals reviewed;risks/benefits reviewed;current/potential barriers reviewed  - care plan/treatment goals reviewed  - --         Recommendations    Therapy Frequency (Swallow) -- 5 days per week  - 5 days per week  -      Predicted Duration Therapy Intervention (Days) -- until discharge  - until discharge  -      SLP Diet Recommendation NPO;temporary alternate methods of nutrition/hydration  - NPO;temporary alternate methods of nutrition/hydration  - NPO;temporary alternate methods of nutrition/hydration  -      Recommended Diagnostics -- --  will continue to assess in dx tx as pt is not appropriate for instrumental FEES at this time  - --      Recommended Precautions and Strategies -- general aspiration precautions  - --      Oral Care Recommendations -- Oral Care BID/PRN;Suction toothbrush  - Oral Care BID/PRN;Suction toothbrush  -      SLP Rec. for Method of Medication Administration -- meds via alternate route  - meds via alternate route  -      Monitor for Signs of Aspiration -- yes;notify SLP if any concerns  - --      Anticipated Discharge Disposition (SLP) extended care facility  - skilled nursing facility  - inpatient rehabilitation facility  -                User Key  (r) = Recorded By, (t) = Taken By, (c) = Cosigned By      Initials Name Effective Dates     Kimberlee Castillo, MS CCC-SLP 02/03/23 -      Eliz Cartagena, MS CCC-SLP 07/11/23 -      Maya Riddle MS CCC-SLP 06/16/21 -                     EDUCATION  The patient has been educated in the following areas:   Dysphagia (Swallowing  Impairment) Oral Care/Hydration.        SLP GOALS       Row Name 03/29/24 1010 03/28/24 1000 03/27/24 1000       (LTG) Patient will demonstrate functional swallow for    Diet Texture (Demonstrate functional swallow) pureed textures  -AC pureed textures  -CJ pureed textures  -CH    Liquid viscosity (Demonstrate functional swallow) nectar/ mildly thick liquids  -AC nectar/ mildly thick liquids  -CJ nectar/ mildly thick liquids  -CH    [place] (Demonstrate functional swallow) with 1:1 assist/ supervision  -AC with 1:1 assist/ supervision  -CJ with 1:1 assist/ supervision  -CH    Time Frame (Demonstrate functional swallow) by discharge  -AC by discharge  -CJ by discharge  -CH    Progress/Outcomes (Demonstrate functional swallow) progress slower than expected  -AC progress slower than expected  -CJ --       (STG) Patient will tolerate therapeutic trials of    Consistencies Trialed (Tolerate therapeutic trials) thin liquids;nectar/ mildly thick liquids;pureed textures  -AC thin liquids;nectar/ mildly thick liquids;pureed textures  -CJ thin liquids;nectar/ mildly thick liquids;pureed textures  -CH    Desired Outcome (Tolerate therapeutic trials) without signs/symptoms of aspiration;without signs of distress;with adequate oral prep/transit/clearance  -AC without signs/symptoms of aspiration;without signs of distress;with adequate oral prep/transit/clearance  -CJ without signs/symptoms of aspiration;without signs of distress;with adequate oral prep/transit/clearance  -CH    [place] (Tolerate therapeutic trials) with 1:1 assist/ supervision  -AC with 1:1 assist/ supervision  -CJ with 1:1 assist/ supervision  -CH    Time Frame (Tolerate therapeutic trials) 1 week  -AC 1 week  -CJ 1 week  -CH    Progress/Outcomes (Tolerate therapeutic trials) progress slower than expected  -AC progress slower than expected  -CJ --    Comment (Tolerate therapeutic trials) Pt exhibited wet vocal quality and cont'd aphonia @ baseline.  Difficulty producing effective cough w/ cues. Trialed 1 small ice chip. Immediate coughing prior to swallow while pt took prolonged amount of time to manipulate. DNT further PO trials 2' severity of s/sxs aspiration.  -AC swallow initiatied in 1/5 trials  -CJ As appropriate pending secretion mgt  -CH       (STG) Lingual Strengthening Goal 1 (SLP)    Activity (Lingual Strengthening Goal 1, SLP) increase lingual tone/sensation/control/coordination/movement  -AC -- --    Increase Lingual Tone/Sensation/Control/Coordination/Movement lingual movement exercises  -AC -- --    [place]/Accuracy (Lingual Strengthening Goal 1, SLP) with maximum cues (25-49% accuracy)  -AC -- --    Time Frame (Lingual Strengthening Goal 1, SLP) 1 week  -AC -- --    Progress/Outcomes (Lingual Strengthening Goal 1, SLP) new goal  -AC -- --    Comment (Lingual Strengthening Goal 1, SLP) Approximated lingual protrusion x5 w/ max cues.  -AC -- --       (STG) Pharyngeal Strengthening Exercise Goal 1 (SLP)    Activity (Pharyngeal Strengthening Goal 1, SLP) -- increase pharyngeal sensation;increase timing  -CJ increase pharyngeal sensation;increase timing  -CH    Increase Pharyngeal Sensation gustatory stimulation (sour/cold)  -AC gustatory stimulation (sour/cold)  -CJ gustatory stimulation (sour/cold)  -CH    Increase Timing prepping - 3 second prep or suck swallow or 3-step swallow  -AC prepping - 3 second prep or suck swallow or 3-step swallow  -CJ prepping - 3 second prep or suck swallow or 3-step swallow  -CH    [place]/Accuracy (Pharyngeal Strengthening Goal 1, SLP) with maximum cues (25-49% accuracy)  -AC with maximum cues (25-49% accuracy)  -CJ with maximum cues (25-49% accuracy)  -CH    Time Frame (Pharyngeal Strengthening Goal 1, SLP) 1 week  -AC 1 week  -CJ 1 week  -CH    Progress/Outcomes (Pharyngeal Strengthening Goal 1, SLP) progress slower than expected  -AC progress slower than expected  -CJ --    Comment (Pharyngeal  Strengthening Goal 1, SLP) Volitional swallow elicited w/ 3 sec prep 1 of 5 trials. Seemingly weak per palpation.  -AC audible upper airway secretions; swallow elicited x1  -CJ --              User Key  (r) = Recorded By, (t) = Taken By, (c) = Cosigned By      Initials Name Provider Type    Kimberlee Pastor MS CCC-SLP Speech and Language Pathologist    Eliz Mooney, MS CCC-SLP Speech and Language Pathologist    Maya Broderick, MS CCC-SLP Speech and Language Pathologist                       Time Calculation:    Time Calculation- SLP       Row Name 03/29/24 1042             Time Calculation- SLP    SLP Start Time 1010  -AC      SLP Received On 03/29/24  -AC         Untimed Charges    29182-TQ Treatment Swallow Minutes 36  -AC         Total Minutes    Untimed Charges Total Minutes 36  -AC       Total Minutes 36  -AC                User Key  (r) = Recorded By, (t) = Taken By, (c) = Cosigned By      Initials Name Provider Type     Kimberlee Castillo MS CCC-SLP Speech and Language Pathologist                    Therapy Charges for Today       Code Description Service Date Service Provider Modifiers Qty    [id number]  ST TREATMENT SWALLOW 2 3/29/2024 Kimberlee Castillo MS CCC-SLP GN 1                 Kimberlee Castillo MS CCC-SLP  3/29/2024

## 2024-03-29 NOTE — THERAPY TREATMENT NOTE
Patient Name: Omkar Nava  : 1955    MRN: [id number]                              Today's Date: 3/29/2024       Admit Date: 3/10/2024    Visit Dx:     ICD-10-CM ICD-9-CM   1. Acute respiratory failure with hypoxia  J96.01 518.81   2. History of dementia  Z86.59 V11.8   3. Pneumonia due to infectious organism, unspecified laterality, unspecified part of lung  J18.9 486   4. Hypothermia, initial encounter  T68.XXXA 991.6   5. Anemia, unspecified type  D64.9 285.9   6. Hyperkalemia  E87.5 276.7   7. Encephalopathy  G93.40 348.30   8. Protein-calorie malnutrition, unspecified severity  E46 263.9   9. Iron deficiency anemia, unspecified iron deficiency anemia type  D50.9 280.9   10. 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, without long-term current use of insulin    Orthostatic hypotension    Acute osteomyelitis of left foot    Type 2 diabetes mellitus    Essential hypertension    Psychotic disorder    Neurocognitive disorder    S/P transmetatarsal amputation of foot, left    Hypoxia    Abscess of left foot    Anemia of chronic disease    Aspiration pneumonia    Cellulitis of left toe    Cellulitis of lower extremity    Cervical spine pain    Chronic kidney disease    Closed head injury without loss of consciousness    Dementia    Dental cavities    Diarrhea of presumed infectious origin    Displaced fracture of proximal phalanx of left great toe, initial encounter for open fracture    Dysphagia    Erectile dysfunction    Fall    Gas gangrene    Generalized muscle weakness    Hallucinations    Hypertensive heart and chronic kidney disease 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 failure, with stage 1 through stage 4 chronic kidney disease, or unspecified chronic kidney disease    Insomnia due to medical condition    Iron deficiency  anemia    J15.0, ESBL Klebsiella pneumonia    Laceration without foreign body, left foot, subsequent encounter    Long term (current) use of insulin    Other acute osteomyelitis, left ankle and foot    Personal history of Methicillin resistant Staphylococcus aureus infection    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    Protein calorie malnutrition    Simple chronic bronchitis    Tobacco use    Type 2 diabetes mellitus with diabetic neuropathy, unspecified    Wound infection, posttraumatic    Type 2 diabetes mellitus with diabetic chronic kidney disease    Encephalopathy    Bilateral pneumonia    Acute kidney injury superimposed on chronic kidney disease    Acute type 1 respiratory failure     Past Medical History:   Diagnosis Date    Anemia     Dementia     Diabetes mellitus     Dysphagia     GERD (gastroesophageal reflux disease)     History of alcohol abuse     History of cocaine use     History of marijuana use     Hypertension     Osteomyelitis     Poor historian     records obtained from nursing home records & his family    Visual impairment      Past Surgical History:   Procedure Laterality Date    AMPUTATION FOOT Left 10/18/2022    Procedure: PARTIAL FIRST RAY AMPUTATION LEFT;  Surgeon: Yeison Petty MD;  Location:  [facility];  Service: Orthopedics;  Laterality: Left;    AMPUTATION FOOT Left 12/5/2022    Procedure: Transmetatarsal of Left Foot;  Surgeon: Yeison Petty MD;  Location:  [facility];  Service: Orthopedics;  Laterality: Left;    ENDOSCOPY N/A 3/14/2024    Procedure: ESOPHAGOGASTRODUODENOSCOPY WITH JEJUNAL TUBE INSERTION AT BEDSIDE;  Surgeon: Brunner, Mark I, MD;  Location:  [facility];  Service: Gastroenterology;  Laterality: N/A;    EYE SURGERY        General Information       Row Name 03/29/24 4207          Physical Therapy Time and Intention    Document Type therapy note (daily note)  -AY     Mode of Treatment physical therapy  -AY       Row Name 03/29/24 4778          General Information    Patient  Profile Reviewed yes  -AY     Existing Precautions/Restrictions fall;oxygen therapy device and L/min;other (see comments)  NG. Remote L transmetatarsal amputation.  -AY     Barriers to Rehab medically complex;visual deficit;cognitive status  -AY       Row Name 03/29/24 1205          Cognition    Orientation Status (Cognition) oriented to;person  -AY       Row Name 03/29/24 1205          Safety Issues, Functional Mobility    Safety Issues Affecting Function (Mobility) insight into deficits/self-awareness;safety precautions follow-through/compliance;sequencing abilities;safety precaution awareness;awareness of need for assistance  -AY     Impairments Affecting Function (Mobility) balance;cognition;coordination;endurance/activity tolerance;motor control;pain;postural/trunk control;range of motion (ROM);visual/perceptual;strength  -AY     Cognitive Impairments, Mobility Safety/Performance attention  -AY               User Key  (r) = Recorded By, (t) = Taken By, (c) = Cosigned By      Initials Name Provider Type    Delia Cazares PT Physical Therapist                   Mobility       Row Name 03/29/24 1206          Transfers    Comment, (Transfers) recieved [facility]. further t/f deferred d/t poor sitting balance and increased lethargy  -AY               User Key  (r) = Recorded By, (t) = Taken By, (c) = Cosigned By      Initials Name Provider Type    Delia Cazares PT Physical Therapist                   Obj/Interventions       Row Name 03/29/24 1207          Motor Skills    Therapeutic Exercise knee;ankle  -AY       Row Name 03/29/24 1207          Knee (Therapeutic Exercise)    Knee (Therapeutic Exercise) AAROM (active assistive range of motion)  -AY     Knee AAROM (Therapeutic Exercise) bilateral;flexion;extension;sitting;10 repetitions  -AY       Row Name 03/29/24 1207          Ankle (Therapeutic Exercise)    Ankle (Therapeutic Exercise) AROM (active range of motion)  -AY     Ankle AROM (Therapeutic Exercise)  bilateral;dorsiflexion;plantarflexion;10 repetitions;sitting  -AY       Row Name 03/29/24 1207          Balance    Balance Assessment sitting static balance;sitting dynamic balance  -AY     Static Sitting Balance contact guard  -AY     Dynamic Sitting Balance minimal assist  -AY     Position, Sitting Balance unsupported;sitting in chair  -AY     Comment, Balance A/P leans x5. progressed to CGA for sitting balance wiht BUE/LE support. fatigued quickly (~4-5 minutes of unsupported sitting) then demonstrated increased lethargy  -AY               User Key  (r) = Recorded By, (t) = Taken By, (c) = Cosigned By      Initials Name Provider Type    AY Delia Lee, PT Physical Therapist                   Goals/Plan    No documentation.                  Clinical Impression       Row Name 03/29/24 1208          Pain    Pretreatment Pain Rating 0/10 - no pain  -AY     Posttreatment Pain Rating 0/10 - no pain  -AY     Pain Intervention(s) Ambulation/increased activity;Repositioned  -AY     Additional Documentation Pain Scale: Numbers Pre/Post-Treatment (Group)  -AY       Row Name 03/29/24 1208          Plan of Care Review    Plan of Care Reviewed With patient  -AY     Progress improving  -AY     Outcome Evaluation Pt showing improvement as he progressed to CGA for sitting balance and demonstrated improved alertness/participation throughout. Cont to progress as able; limited by decreased functional endurance, balance deficit, and impaired cognition compared to baseline. d/c rec for SNF.  -AY       Row Name 03/29/24 1208          Vital Signs    Pre Systolic BP Rehab 140  -AY     Pre Treatment Diastolic BP 52  -AY     Post Systolic BP Rehab 131  -AY     Post Treatment Diastolic BP 60  -AY     Pretreatment Heart Rate (beats/min) 66  -AY     Posttreatment Heart Rate (beats/min) 67  -AY     Pre SpO2 (%) 96  -AY     O2 Delivery Pre Treatment nasal cannula  -AY     O2 Delivery Intra Treatment nasal cannula  -AY     Post SpO2 (%) 98   -AY     O2 Delivery Post Treatment nasal cannula  -AY     Pre Patient Position Sitting  -AY     Intra Patient Position Sitting  -AY     Post Patient Position Sitting  -AY       Row Name 03/29/24 1208          Positioning and Restraints    Pre-Treatment Position sitting in chair/recliner  -AY     Post Treatment Position chair  -AY     In Chair notified nsg;reclined;sitting;call light within reach;encouraged to call for assist;exit alarm on;legs elevated;LUE elevated;RUE elevated;waffle cushion;on mechanical lift sling  -AY               User Key  (r) = Recorded By, (t) = Taken By, (c) = Cosigned By      Initials Name Provider Type    Delia Cazares PT Physical Therapist                   Outcome Measures       Row Name 03/29/24 1211          How much help from another person do you currently need...    Turning from your back to your side while in flat bed without using bedrails? 2  -AY     Moving from lying on back to sitting on the side of a flat bed without bedrails? 2  -AY     Moving to and from a bed to a chair (including a wheelchair)? 1  -AY     Standing up from a chair using your arms (e.g., wheelchair, bedside chair)? 1  -AY     Climbing 3-5 steps with a railing? 1  -AY     To walk in hospital room? 1  -AY     AM-PAC 6 Clicks Score (PT) 8  -AY     Highest Level of Mobility Goal 3 --> Sit at edge of bed  -AY       Row Name 03/29/24 1211          Functional Assessment    Outcome Measure Options AM-PAC 6 Clicks Basic Mobility (PT)  -AY               User Key  (r) = Recorded By, (t) = Taken By, (c) = Cosigned By      Initials Name Provider Type    Delia Cazares PT Physical Therapist                                 Physical Therapy Education       Title: PT OT SLP Therapies (In Progress)       Topic: Physical Therapy (In Progress)       Point: Mobility training (In Progress)       Learning Progress Summary             Patient Acceptance, E,TB, NR by MAHESH at 3/29/2024 1211    Acceptance, E, NR by ND at  3/27/2024 1549    Acceptance, E, NR by ND at 3/26/2024 1517    Acceptance, E,TB, NR by ES at 3/25/2024 1043   Family Acceptance, E, NR by RL at 3/16/2024 0108                         Point: Home exercise program (In Progress)       Learning Progress Summary             Patient Acceptance, E,TB, NR by AY at 3/29/2024 1211    Acceptance, E, NR by ND at 3/27/2024 1549    Acceptance, E, NR by ND at 3/26/2024 1517   Family Acceptance, E, NR by RL at 3/16/2024 0108                         Point: Body mechanics (In Progress)       Learning Progress Summary             Patient Acceptance, E,TB, NR by AY at 3/29/2024 1211    Acceptance, E, NR by ND at 3/27/2024 1549    Acceptance, E, NR by ND at 3/26/2024 1517    Acceptance, E,TB, NR by ES at 3/25/2024 1043   Family Acceptance, E, NR by RL at 3/16/2024 0108                         Point: Precautions (In Progress)       Learning Progress Summary             Patient Acceptance, E,TB, NR by AY at 3/29/2024 1211    Acceptance, E, NR by ND at 3/27/2024 1549    Acceptance, E, NR by ND at 3/26/2024 1517    Acceptance, E,TB, NR by ES at 3/25/2024 1043   Family Acceptance, E, NR by RL at 3/16/2024 0108                                         User Key       Initials Effective Dates Name Provider Type Discipline    RL 06/16/21 -  Beth Land RN Registered Nurse Nurse    AY 11/10/20 -  Delia Lee, PT Physical Therapist PT    ES 08/11/22 -  Emmy Castellanos, KYRA Physical Therapist PT    ND 11/16/23 -  Libra Lugo, PT Physical Therapist PT                  PT Recommendation and Plan     Plan of Care Reviewed With: patient  Progress: improving  Outcome Evaluation: Pt showing improvement as he progressed to CGA for sitting balance and demonstrated improved alertness/participation throughout. Cont to progress as able; limited by decreased functional endurance, balance deficit, and impaired cognition compared to baseline. d/c rec for SNF.     Time Calculation:         PT  Charges       Row Name 03/29/24 1211             Time Calculation    Start Time 1010  -AY      PT Received On 03/29/24  -AY         Timed Charges    87986 - PT Therapeutic Activity Minutes 9  -AY         Total Minutes    Timed Charges Total Minutes 9  -AY       Total Minutes 9  -AY                User Key  (r) = Recorded By, (t) = Taken By, (c) = Cosigned By      Initials Name Provider Type    AY Delia Lee, PT Physical Therapist                  Therapy Charges for Today       Code Description Service Date Service Provider Modifiers Qty    96608060657 HC PT THERAPEUTIC ACT EA 15 MIN 3/29/2024 Delia Lee, PT GP 1            PT G-Codes  Outcome Measure Options: AM-PAC 6 Clicks Basic Mobility (PT)  AM-PAC 6 Clicks Score (PT): 8  AM-PAC 6 Clicks Score (OT): 6  PT Discharge Summary  Anticipated Discharge Disposition (PT): skilled nursing facility    Delia Lee PT  3/29/2024

## 2024-03-29 NOTE — PLAN OF CARE
Goal Outcome Evaluation:  Plan of Care Reviewed With: patient        Progress: no change  Outcome Evaluation: Palliative RN saw pt on 3/29 at 0900.  New palliative consult for assisance with GOC per Dr. Arellano.  LISA is patient's daughter Idalia Lerma.  NARGIS Galvan saw pt for initial provider consult.  She called daughter to discuss code status and GOC.  Daughter would like pt to get PEG and to return to [facility].  Today during palliative nursing visit, pt was sitting up in bed on 2LNC with audible secretions.  This has been an ongoing issue since extuabtion 4 days ago per nursing report.  Pt. denied pain, nausea and shortness of breath.  He is able to nod appropriately.  FMS in place.  FC in place.  Palliative care to follow for support, POC and ongoing GOC.      Problem: Palliative Care  Goal: Enhanced Quality of Life  Intervention: Promote Advance Care Planning  Flowsheets (Taken 3/29/2024 1237)  Life Transition/Adjustment:   palliative care initiated   palliative care discussed      2820 Palliative IDT meeting: DEVON Jorgensen RN, CHPN; CLIFF Buchanan MD.; JOHN Martinez MDiv ; CLIFF [facility], [facility]; NARGIS Galvan; AMAIRANI Nava RN, CHPN  After hours, weekends and holidays, contact Palliative Provider by calling [phone].

## 2024-03-29 NOTE — CASE MANAGEMENT/SOCIAL WORK
Continued Stay Note  [facility]     Patient Name: Omkar Nava  MRN: [id number]  Today's Date: 3/29/2024    Admit Date: 3/10/2024    Plan: SNF   Discharge Plan       Row Name 03/29/24 1400       Plan    Plan SNF    Patient/Family in Agreement with Plan yes    Plan Comments Spoke with patient's daughter, Idalia, by phone to discuss disposition.  Patient has recently been to [facility] for SNF, but he does not have a bed hold.  New referrals will need to be made when nearing medical readiness.  CM left the patient choice list at the patient's bedside for Idalia to review and choose at least 3-4 facilities for referrals. Discussed patient in MDR; possible PEG placement early next week.  Palliative consulted for GOC discussions.  CM will follow up with Idalia on Monday.                   Discharge Codes    No documentation.                       Daisy Kelly RN

## 2024-03-29 NOTE — PLAN OF CARE
Goal Outcome Evaluation:  Plan of Care Reviewed With: patient        Progress: no change  Outcome Evaluation: Pt continues to require maxA x2, near dependence to perform bed mobility. Pt dependently tranferred to chair today using mechanical lift. Pt tolerated BUE AAROM well, however requires frequent cues to maintain attention and alertness. The pt remains below his functional baseline with generalized weakness, decreased activity tolerance, and balance deficits warranting continued IP OT services. Recommend a d/c to SNF for best outcome.      Anticipated Discharge Disposition (OT): skilled nursing facility

## 2024-03-29 NOTE — PLAN OF CARE
Goal Outcome Evaluation:  Plan of Care Reviewed With: patient        Progress: improving  Outcome Evaluation: Pt showing improvement as he progressed to CGA for sitting balance and demonstrated improved alertness/participation throughout. Cont to progress as able; limited by decreased functional endurance, balance deficit, and impaired cognition compared to baseline. d/c rec for SNF.      Anticipated Discharge Disposition (PT): skilled nursing facility

## 2024-03-29 NOTE — PROGRESS NOTES
Clinical Nutrition     Nutrition Support Assessment  Reason for Visit: Follow-up protocol, EN    Patient Name: Omkar Nava  YOB: 1955  MRN: [id number]  Date of Encounter: 03/28/24 23:35 EDT  Admission date: 3/10/2024    Comment: One day documentation consistent w > 20 hr EN delivery. Watch for trend. Note BS cont elevated however less than prior w no dextrose infusion.     Nutrition Assessment   Admission Diagnosis:  Healthcare-associated pneumonia [J18.9]  Acute respiratory failure with hypoxemia [J96.01]      Problem List:    Acute type 1 respiratory failure    Essential hypertension    Dementia    Dysphagia    J15.0, ESBL Klebsiella pneumonia    Protein calorie malnutrition    Type 2 diabetes mellitus with diabetic chronic kidney disease    Acute kidney injury superimposed on chronic kidney disease        PMH:   He  has a past medical history of Anemia, Dementia, Diabetes mellitus, Dysphagia, GERD (gastroesophageal reflux disease), History of alcohol abuse, History of cocaine use, History of marijuana use, Hypertension, Osteomyelitis, Poor historian, and Visual impairment.    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      Applicable Nutrition Concerns:   Skin:  Oral: history of dysphagia - came in with chocking incident   GI: came with constipation; improved, now with diarrhea    Applicable Interval History:   (3/10) Intubated   (3/11) EN started - Novasource Renal   (3/20) NG replaced with NJ  (3/25) Changed to standard formula- Isosource 1.5   Extubated   SLP- NPO    Reported/Observed/Food/Nutrition Related History:     3/28) One day documentation consistent w > 20 hr EN delivery. Watch for trend. Note BS cont elevated however less than prior w no dextrose infusion.     3/25) Pt continues tolerating EN. Is having significant FMS  output. Fiber previously added was d/c per Intensivist. Renal fx improved, discussed in MDR Intensivist to change to standard formula. BG running high insulin changes made. Also receiving D5 infusion for previous hypernatremia, Na WNL today. IC was completed and needs reassessed. At time of this note pt has been extubated. SLP has seen and continues to recommend NPO    3/22) Tolerating EN. Phos dropping past 3 days, critically low this am and to be replaced, K low as well. Creatinine WNL today. Suspect low lytes 2/2 renal formula, however hesitant to change at this time given prolonged poor renal fx in setting of renal disease. Will add prosource to better meet protein needs and may assist with lytes as well. If no improvement in next 72 hr, will consider formula change. Pt now with FMS, will add some fiber for stool formation. Documented weights continue to be higher than suspected actual, several liters positive. Ordered IC.     3/21) Tolerating EN at goal. Bowels moving. NG replaced with NJ.      3/19) Pt tolerating EN without issue. Remains intubated/sedated. Bowels moving. Renal fx remains poor, nephrology following and increased water flushes over the weekend. Still hypernatremic, will increase further.     3/15) Pt with abd. distention yesterday and EN was held. Imagining showed mild excess fluid in bowel and stomach, stool burden. No BM yet, receiving senokot, colace, and miralax. Gastroenterology placed NJ in EGD yesterday. Discussed in MDR, Intensivist okay with restarting slow/low. Pt with hypoglycemia while EN was on hold and D20 infusion added, discussed plan to stop this as EN advances to goal. Renal fx labs slightly worsened today. O2 sats improved and no current plans for proning pt.      3/14) Remains intubated, off sedation.  Overall improved renal status.  Patient was taken to CT of abdomen this morning; not clear reason, no documentation of any GI issues.    Discussed with RN caring for patient  today. Reports patient was starting to have abdominal distention yesterday, EN was turned off; turned back on at some point. She noted patient with increased distention this morning which was not the case yesterday.  CT of abdomen and KUB results noted. It seem patient is still with significant stool burden.  RN giving ordered bowel regiment now - not given yesterday per MAR records?     3/11) Pt presents in ARF, intubated/sedated. Intensivist consulted for EN and has already placed order for Novasource which RD concurs with. Pt from NH and came to ED after choking incident. Pt with severe dementia and aggression. Advanced chronic diseases HF, CKD, DM, dysphagia. Pt is blind.   Pt has NG. Did have some emesis on POA, KUB showed stool burden, pt now has had several BMs. No further emesis. Pt with significantly high Potassium on POA, improved with several doses lokelma to 6.0 today. Renal fx labs poor, A/C renal failure per Nephrology. Pt was receiving propofol but this was stopped, plan to transition to precedex and fentanyl. Pt was also receiving NS @ 100 ml/hr which was d/c this am. No current vasopressor support.     Labs    Labs Reviewed: Yes     Results from last 7 days   Lab Units 03/28/24  0525 03/27/24  0419 03/26/24  1600 03/26/24  0443 03/25/24  1524 03/25/24  0027 03/24/24  0420 03/23/24  1330 03/23/24  0420   GLUCOSE mg/dL 161* 162*  --  57*  --  235* 160*  --  191*   BUN mg/dL 45* 41*  --  42*  --  40* 42*  --  38*   CREATININE mg/dL 0.97 0.95  --  1.13  --  1.25 1.39*  --  1.15   SODIUM mmol/L 141 143  --  140   < > 145 149*  --  145   CHLORIDE mmol/L 107 108*  --  105  --  112* 114*  --  111*   POTASSIUM mmol/L 4.7 4.4 4.2 3.6  --  3.9 4.5  --  3.9   PHOSPHORUS mg/dL  --  2.9  --   --   --  3.4 3.2   < > 2.1*   MAGNESIUM mg/dL  --  2.2  --   --   --  1.9  --   --  2.1   ALT (SGPT) U/L  --   --   --   --   --  380*  --   --   --     < > = values in this interval not displayed.       Results from last 7  "days   Lab Units 03/25/24  0027 03/24/24  0420   ALBUMIN g/dL 2.4* 2.5*   PREALBUMIN mg/dL 13.8*  --    CRP mg/dL 4.73*  --    TRIGLYCERIDES mg/dL 106  --          Results from last 7 days   Lab Units 03/28/24  1720 03/28/24  1121 03/28/24  0517 03/27/24  2327 03/27/24  1744 03/27/24  1159   GLUCOSE mg/dL 174* 193* 162* 200* 181* 195*     Lab Results   Lab Value Date/Time    HGBA1C 7.00 (H) 10/16/2022 0432    HGBA1C 8.7 (H) 06/25/2022 0242    HGBA1C 13.4 04/14/2022 1058                   Medications    Medications Reviewed: Yes  Pertinent: Insulin, Prevacid Demadex   Infusion:   PRN:     Intake/Ouptut 24 hrs (0701 - 0700)   I&O's Reviewed: Yes   Intake & Output (last day)         03/28 0701  03/29 0700    Blood 757    Other 200    NG/GT 1228    Total Intake(mL/kg) 2185 (23.9)    Urine (mL/kg/hr) 2850 (1.9)    Stool 200    Total Output 3050    Net -865              3/27 rectal tube output 700    Anthropometrics     Height: Height: 182.9 cm (72\")  Last Filed Weight: Weight: 91.5 kg (201 lb 11.5 oz) (03/28/24 0600)  Method: Weight Method: Bed scale  BMI: BMI (Calculated): 27.4  BMI classification: Obese Class II: 35-39.9kg/m2  IBW:      UBW: fluctuates 2/2 CKD ~180-200 lb since 2022   Weight       Weight (kg) Weight (lbs) Weight Method Visit Report   8/9/2023 88.905 kg  196 lb  Bed scale     1/15/2024 93.441 kg  206 lb  Estimated     1/16/2024 90.855 kg  200 lb 4.8 oz  Bed scale      90 kg  198 lb 6.6 oz      1/17/2024 95.21 kg  209 lb 14.4 oz      1/18/2024 85.548 kg  188 lb 9.6 oz  Bed scale     1/19/2024 82.691 kg  182 lb 4.8 oz  Bed scale     1/20/2024 83.054 kg  183 lb 1.6 oz  Bed scale     1/21/2024 85.14 kg  187 lb 11.2 oz  Bed scale     2/5/2024 85.1 kg  187 lb 9.8 oz  Estimated     2/22/2024 84.823 kg  187 lb   --    3/10/2024 84.823 kg  187 lb  Stated      98.1 kg  216 lb 4.3 oz  Bed scale     3/11/2024 90 kg  198 lb 6.6 oz  Bed scale       Weight change: no significant changes - suspect currently holding " "fluid    Nutrition Focused Physical Exam     Date: 3/11, 3/22    Unable to perform exam due to: Pt unable to participate at time of visit, Defer pending indication    Needs Assessment   Date: 3/11, reassessed 3/19, 3/22, 3/25    Height used:Height: 182.9 cm (72\")  Weights used: 85 kg / 187 lb (ABW), 53 kg / 117 lb (IBW)  *suspected dry weight likely ~185 lb given previously documented including recent MDOV weight. Likely currently holding fluid, will continue to monitor.    Estimated Calorie needs: ~1850 Kcal/day   Method:  Kcals/KG 15-20 = 3127-9044  Method:   IC = 1850 kcal, RQ=0.73      Estimated Protein needs: ~102 g PRO/day  Method: 1.2 g/Kg ABW = 102    Estimated Fluid needs: ~ ml/day   Per clinical status    Current Nutrition Prescription     PO: NPO Diet NPO Type: Strict NPO  Oral Nutrition Supplement:   Intake: N/A    EN: IsoSource 1.5  Goal Rate: 63 ml/hr   Water Flushes: 30ml ev 2 hr  Modular: Prosource -no carb 1/day  Route: NG  Tube: Large bore    At goal over: 20Hrs/day    Rx will supply:   Goal Volume 1260 mL/day       Flush Volume 300 mL/day       Energy 1950 Kcal/day 105 % Est Need   Protein 101 g/day 99 % Est Need   Fiber 19.2 g/day       Water in   mL       Total Water 1258 mL       Meet DRI Yes           --------------------------------------------------------------------------  Product/Rate verified at bedside: Yes   Infusing Rate at time of visit:  Isosource 1.5 63 ml/hr water 30 ml ev 2 hr    Average Delivery from [facility] Days: note 1st 2 of the 4 days on prev rx Novsource Renal  40 ml/hr Water at 30 ml/hr one prosource/day.  Volume  mL/day   % Goal Vol.   Flush Volume 366 mL/day     Energy 1852 Kcal/day 100 % Est Need   Protein 96 g/day 94 % Est Need   Fiber 10.6 g/day     Water in   mL     Total Water 1164 mL     Meet DRI No      Note one day 3/27 on Isosource 1.5 current rx: 1501 ml 119% goal volume w documented Prosource: for 2432 Kcal 131 % est need, 132 g % est " need, 22.8 g Fiber, 1141 ml Water in EN 1591 total ml Free Water.      Nutrition Diagnosis   Date: 3/11  Updated: 3/14  Problem Inadequate oral intake   Etiology ARF requiring mechanical ventilation   Signs/Symptoms Intubated/sedated, NPO   Status: Receiving EN    Date: 3/11  Updated: 3/14  Problem Altered nutrition related laboratory values   Etiology A/C Renal Failure   Signs/Symptoms Creatinine 2.02, BUN 57, K 6.0   Status: Resolved- Creatinine WNL, BUN 40    Goal:   General: Nutrition to support treatment  PO: Advance diet as medically feasible/appropriate  EN/PN: Maintain EN at this time follow for delivery trend    Nutrition Intervention      Follow treatment progress, Care plan reviewed      Monitoring/Evaluation:   Per protocol, I&O, Pertinent labs, EN delivery/tolerance, Weight, GI status, Symptoms, POC/GOC, Swallow function, Hemodynamic stability      Melissa Serrano RD,   Time Spent: 35 min

## 2024-03-30 LAB
ANION GAP SERPL CALCULATED.3IONS-SCNC: 6 MMOL/L (ref 5–15)
BASOPHILS # BLD AUTO: 0.06 10*3/MM3 (ref 0–0.2)
BASOPHILS NFR BLD AUTO: 0.7 % (ref 0–1.5)
BUN SERPL-MCNC: 54 MG/DL (ref 8–23)
BUN/CREAT SERPL: 54.5 (ref 7–25)
CALCIUM SPEC-SCNC: 9.1 MG/DL (ref 8.6–10.5)
CHLORIDE SERPL-SCNC: 107 MMOL/L (ref 98–107)
CO2 SERPL-SCNC: 29 MMOL/L (ref 22–29)
CREAT SERPL-MCNC: 0.99 MG/DL (ref 0.76–1.27)
DEPRECATED RDW RBC AUTO: 53.1 FL (ref 37–54)
EGFRCR SERPLBLD CKD-EPI 2021: 84 ML/MIN/1.73
EOSINOPHIL # BLD AUTO: 0.44 10*3/MM3 (ref 0–0.4)
EOSINOPHIL NFR BLD AUTO: 5 % (ref 0.3–6.2)
ERYTHROCYTE [DISTWIDTH] IN BLOOD BY AUTOMATED COUNT: 15.9 % (ref 12.3–15.4)
GLUCOSE BLDC GLUCOMTR-MCNC: 117 MG/DL (ref 70–130)
GLUCOSE BLDC GLUCOMTR-MCNC: 123 MG/DL (ref 70–130)
GLUCOSE BLDC GLUCOMTR-MCNC: 126 MG/DL (ref 70–130)
GLUCOSE BLDC GLUCOMTR-MCNC: 136 MG/DL (ref 70–130)
GLUCOSE SERPL-MCNC: 142 MG/DL (ref 65–99)
HCT VFR BLD AUTO: 28.7 % (ref 37.5–51)
HGB BLD-MCNC: 8.7 G/DL (ref 13–17.7)
IMM GRANULOCYTES # BLD AUTO: 0.07 10*3/MM3 (ref 0–0.05)
IMM GRANULOCYTES NFR BLD AUTO: 0.8 % (ref 0–0.5)
LYMPHOCYTES # BLD AUTO: 0.88 10*3/MM3 (ref 0.7–3.1)
LYMPHOCYTES NFR BLD AUTO: 10 % (ref 19.6–45.3)
MCH RBC QN AUTO: 27.9 PG (ref 26.6–33)
MCHC RBC AUTO-ENTMCNC: 30.3 G/DL (ref 31.5–35.7)
MCV RBC AUTO: 92 FL (ref 79–97)
MONOCYTES # BLD AUTO: 0.54 10*3/MM3 (ref 0.1–0.9)
MONOCYTES NFR BLD AUTO: 6.1 % (ref 5–12)
NEUTROPHILS NFR BLD AUTO: 6.82 10*3/MM3 (ref 1.7–7)
NEUTROPHILS NFR BLD AUTO: 77.4 % (ref 42.7–76)
NRBC BLD AUTO-RTO: 0 /100 WBC (ref 0–0.2)
PLATELET # BLD AUTO: 405 10*3/MM3 (ref 140–450)
PMV BLD AUTO: 11.8 FL (ref 6–12)
POTASSIUM SERPL-SCNC: 4.8 MMOL/L (ref 3.5–5.2)
RBC # BLD AUTO: 3.12 10*6/MM3 (ref 4.14–5.8)
SODIUM SERPL-SCNC: 142 MMOL/L (ref 136–145)
WBC NRBC COR # BLD AUTO: 8.81 10*3/MM3 (ref 3.4–10.8)

## 2024-03-30 PROCEDURE — 82948 REAGENT STRIP/BLOOD GLUCOSE: CPT

## 2024-03-30 PROCEDURE — 63710000001 INSULIN REGULAR HUMAN PER 5 UNITS: Performed by: INTERNAL MEDICINE

## 2024-03-30 PROCEDURE — 80048 BASIC METABOLIC PNL TOTAL CA: CPT | Performed by: INTERNAL MEDICINE

## 2024-03-30 PROCEDURE — 25010000002 HEPARIN (PORCINE) PER 1000 UNITS: Performed by: INTERNAL MEDICINE

## 2024-03-30 PROCEDURE — 94799 UNLISTED PULMONARY SVC/PX: CPT

## 2024-03-30 PROCEDURE — 99232 SBSQ HOSP IP/OBS MODERATE 35: CPT

## 2024-03-30 PROCEDURE — 85025 COMPLETE CBC W/AUTO DIFF WBC: CPT | Performed by: INTERNAL MEDICINE

## 2024-03-30 PROCEDURE — 63710000001 INSULIN DETEMIR PER 5 UNITS: Performed by: INTERNAL MEDICINE

## 2024-03-30 RX ORDER — VALPROIC ACID 250 MG/5ML
150 SOLUTION ORAL EVERY 8 HOURS SCHEDULED
Status: DISCONTINUED | OUTPATIENT
Start: 2024-03-30 | End: 2024-04-04 | Stop reason: HOSPADM

## 2024-03-30 RX ADMIN — BRIMONIDINE TARTRATE 1 DROP: 2 SOLUTION/ DROPS OPHTHALMIC at 16:45

## 2024-03-30 RX ADMIN — HEPARIN SODIUM 5000 UNITS: 5000 INJECTION INTRAVENOUS; SUBCUTANEOUS at 14:54

## 2024-03-30 RX ADMIN — HUMAN INSULIN 6 UNITS: 100 INJECTION, SOLUTION SUBCUTANEOUS at 13:37

## 2024-03-30 RX ADMIN — BRIMONIDINE TARTRATE 1 DROP: 2 SOLUTION/ DROPS OPHTHALMIC at 08:36

## 2024-03-30 RX ADMIN — VALPROIC ACID 150 MG: 250 SOLUTION ORAL at 14:54

## 2024-03-30 RX ADMIN — TERAZOSIN HYDROCHLORIDE ANHYDROUS 5 MG: 5 CAPSULE ORAL at 22:45

## 2024-03-30 RX ADMIN — TIMOLOL MALEATE 1 DROP: 5 SOLUTION/ DROPS OPHTHALMIC at 08:36

## 2024-03-30 RX ADMIN — HYDRALAZINE HYDROCHLORIDE 100 MG: 50 TABLET ORAL at 22:45

## 2024-03-30 RX ADMIN — HUMAN INSULIN 6 UNITS: 100 INJECTION, SOLUTION SUBCUTANEOUS at 06:35

## 2024-03-30 RX ADMIN — CARVEDILOL 12.5 MG: 12.5 TABLET, FILM COATED ORAL at 08:37

## 2024-03-30 RX ADMIN — ALBUTEROL SULFATE 2.5 MG: 2.5 SOLUTION RESPIRATORY (INHALATION) at 19:17

## 2024-03-30 RX ADMIN — VALPROIC ACID 150 MG: 250 SOLUTION ORAL at 22:44

## 2024-03-30 RX ADMIN — Medication 30 ML: at 08:36

## 2024-03-30 RX ADMIN — TIMOLOL MALEATE 1 DROP: 5 SOLUTION/ DROPS OPHTHALMIC at 22:44

## 2024-03-30 RX ADMIN — INSULIN DETEMIR 8 UNITS: 100 INJECTION, SOLUTION SUBCUTANEOUS at 23:12

## 2024-03-30 RX ADMIN — TORSEMIDE 5 MG: 10 TABLET ORAL at 08:37

## 2024-03-30 RX ADMIN — CLONIDINE HYDROCHLORIDE 0.2 MG: 0.2 TABLET ORAL at 14:54

## 2024-03-30 RX ADMIN — HEPARIN SODIUM 5000 UNITS: 5000 INJECTION INTRAVENOUS; SUBCUTANEOUS at 06:35

## 2024-03-30 RX ADMIN — HUMAN INSULIN 6 UNITS: 100 INJECTION, SOLUTION SUBCUTANEOUS at 18:35

## 2024-03-30 RX ADMIN — ALBUTEROL SULFATE 2.5 MG: 2.5 SOLUTION RESPIRATORY (INHALATION) at 12:54

## 2024-03-30 RX ADMIN — HUMAN INSULIN 6 UNITS: 100 INJECTION, SOLUTION SUBCUTANEOUS at 23:58

## 2024-03-30 RX ADMIN — BRIMONIDINE TARTRATE 1 DROP: 2 SOLUTION/ DROPS OPHTHALMIC at 22:44

## 2024-03-30 RX ADMIN — ALBUTEROL SULFATE 2.5 MG: 2.5 SOLUTION RESPIRATORY (INHALATION) at 01:51

## 2024-03-30 RX ADMIN — ARIPIPRAZOLE 5 MG: 10 TABLET ORAL at 08:37

## 2024-03-30 RX ADMIN — LANSOPRAZOLE 30 MG: 15 TABLET, ORALLY DISINTEGRATING ORAL at 06:35

## 2024-03-30 RX ADMIN — CLONIDINE HYDROCHLORIDE 0.2 MG: 0.2 TABLET ORAL at 22:45

## 2024-03-30 RX ADMIN — HYDRALAZINE HYDROCHLORIDE 100 MG: 50 TABLET ORAL at 06:35

## 2024-03-30 RX ADMIN — Medication 30 ML: at 22:46

## 2024-03-30 RX ADMIN — HUMAN INSULIN 6 UNITS: 100 INJECTION, SOLUTION SUBCUTANEOUS at 00:29

## 2024-03-30 RX ADMIN — HEPARIN SODIUM 5000 UNITS: 5000 INJECTION INTRAVENOUS; SUBCUTANEOUS at 22:44

## 2024-03-30 RX ADMIN — FLUOXETINE 20 MG: 20 CAPSULE ORAL at 08:37

## 2024-03-30 RX ADMIN — AMLODIPINE BESYLATE 10 MG: 10 TABLET ORAL at 08:37

## 2024-03-30 RX ADMIN — FLUOXETINE 20 MG: 20 CAPSULE ORAL at 22:45

## 2024-03-30 RX ADMIN — CARVEDILOL 12.5 MG: 12.5 TABLET, FILM COATED ORAL at 22:45

## 2024-03-30 RX ADMIN — TERAZOSIN HYDROCHLORIDE ANHYDROUS 5 MG: 5 CAPSULE ORAL at 08:37

## 2024-03-30 RX ADMIN — INSULIN DETEMIR 8 UNITS: 100 INJECTION, SOLUTION SUBCUTANEOUS at 08:37

## 2024-03-30 RX ADMIN — HYDRALAZINE HYDROCHLORIDE 100 MG: 50 TABLET ORAL at 14:53

## 2024-03-30 RX ADMIN — CLONIDINE HYDROCHLORIDE 0.2 MG: 0.2 TABLET ORAL at 06:35

## 2024-03-30 RX ADMIN — HUMAN INSULIN 2 UNITS: 100 INJECTION, SOLUTION SUBCUTANEOUS at 00:29

## 2024-03-30 NOTE — PROGRESS NOTES
ICU Progress Note     Hospital:  LOS: 20 days     Mr. Omkar Nava, 68 y.o. male is followed for a Chief Complaint of: Respiratory failure, pneumonia    Subjective   S     Interval History:  Extubated x  6 days.    Hemodynamically stable. Patient is somnolent but will open eyes, follow commands and is oriented x 3. Denies pain.     Temp  Min: 96.6 °F (35.9 °C)  Max: 97.9 °F (36.6 °C)      The patient's relevant past medical, surgical and social history were reviewed and updated in Epic as appropriate.      History     Last Reviewed by Emmy Castellanos, PT on 3/25/2024 at 10:35 AM    Sections Reviewed    Medical, Surgical, Family, Tobacco, Custom, Alcohol, Drug Use, Sexual   Activity           OBJECTIVE     Vitals:  Temp: 97.7 °F (36.5 °C) (24 1600) Temp  Min: 96.6 °F (35.9 °C)  Max: 97.9 °F (36.6 °C)   Temp core:      BP: 131/71 (24 1700) BP  Min: 104/64  Max: 147/66   MAP (non-invasive) Noninvasive MAP (mmHg): 87 (24 1700) Noninvasive MAP (mmHg)  Av.4  Min: 28  Max: 138   Pulse: 60 (24 1700) Pulse  Min: 58  Max: 67   Resp: 20 (24 1600) Resp  Min: 16  Max: 22   SpO2: 95 % (24 1700) SpO2  Min: 90 %  Max: 96 %   Device: nasal cannula (24)    Flow Rate: 2 (24) Flow (L/min)  Min: 2  Max: 2         24  0600 24  0600   Weight: 91.5 kg (201 lb 11.5 oz) 91 kg (200 lb 9.9 oz)        Intake/Ouptut 24 hrs (7:00AM - 6:59 AM)  Intake & Output (last 2 days)          07 07 0701   07 07 07    Blood 757      Other 350 360 150    NG/GT 1887 1229 293    Total Intake(mL/kg) 2994 (32.9) 1589 (17.5) 443 (4.9)    Urine (mL/kg/hr) 3600 (1.6) 1450 (0.7) 600 (0.6)    Stool 400 0 150    Total Output 4000 1450 750    Net -1006 +139 -307           Stool Unmeasured Occurrence 1 x            Physical Examination  Telemetry:  Rhythm: normal sinus rhythm (24 1600)         Constitutional:  No acute distress.    Cardiovascular: RRR.    Respiratory: (+) Rhonchi   Abdominal:  Soft with no tenderness.   Extremities: Edema   Neurological:   Awake.   Best Eye Response: 3-->(E3) to speech (03/30/24 1600)  Best Motor Response: 6-->(M6) obeys commands (03/30/24 1600)  Best Verbal Response: 4-->(V4) confused (03/30/24 1600)  Crescencio Coma Scale Score: 13 (03/30/24 1600)     Results Reviewed:  Laboratory  Microbiology  Radiology  Pathology    Hematology:  Results from last 7 days   Lab Units 03/30/24 0528 03/29/24 1216 03/29/24  0544 03/28/24  0643 03/28/24  0525   WBC 10*3/mm3 8.81  --  8.86  --  8.29   HEMOGLOBIN g/dL 8.7* 8.8* 8.7*   < > 6.8*   MCV fL 92.0  --  90.3  --  91.5   PLATELETS 10*3/mm3 405  --  400  --  454*    < > = values in this interval not displayed.     Results from last 7 days   Lab Units 03/30/24 0528 03/29/24 0544 03/28/24  0525   NEUTROS ABS 10*3/mm3 6.82 6.87 6.09   LYMPHS ABS 10*3/mm3 0.88 1.06 1.21   EOS ABS 10*3/mm3 0.44* 0.35 0.32     Chemistry:  Estimated Creatinine Clearance: 94.5 mL/min (by C-G formula based on SCr of 0.99 mg/dL).    Results from last 7 days   Lab Units 03/30/24 0528 03/29/24  1216   SODIUM mmol/L 142 145   POTASSIUM mmol/L 4.8 5.0   CHLORIDE mmol/L 107 109*   CO2 mmol/L 29.0 30.0*   BUN mg/dL 54* 50*   CREATININE mg/dL 0.99 1.20   GLUCOSE mg/dL 142* 134*     Results from last 7 days   Lab Units 03/30/24 0528 03/29/24 1216 03/29/24  0544 03/28/24  0525 03/27/24  0419   CALCIUM mg/dL 9.1 8.9 8.4*   < > 8.2*   MAGNESIUM mg/dL  --   --  2.4  --  2.2   PHOSPHORUS mg/dL  --   --  4.1  --  2.9    < > = values in this interval not displayed.     COVID-19  Lab Results   Component Value Date    COVID19 Not Detected 03/10/2024    COVID19 Not Detected 03/10/2024    COVID19 Not Detected 02/05/2024    COVID19 Not Detected 02/05/2024     Images:  XR Chest 1 View    Result Date: 3/29/2024  Impression: Support hardware projects unchanged. Hazy bilateral airspace opacities persist suggesting  mild edema pattern with trace effusion noted on the left. There is no new focal consolidation or distinct pneumothorax. Electronically Signed: Boom Rodriguez MD  3/29/2024 5:16 AM EDT  Workstation ID: [id number]     Echo:  Results for orders placed during the hospital encounter of 01/15/24    Adult Transthoracic Echo Complete With Contrast if Necessary Per Protocol    Interpretation Summary    Left ventricular ejection fraction appears to be 56 - 60%.    Left ventricular wall thickness is consistent with hypertrophy.    Estimated right ventricular systolic pressure from tricuspid regurgitation is mildly elevated (35-45 mmHg).    There is a small (1-2cm) pericardial effusion.    No evidence for pericardial tamponade    Results: Reviewed.  I reviewed the patient's new laboratory and imaging results.  I independently reviewed the patient's new images.    Medications: Reviewed.    Assessment   A/P     Hospital:  LOS: 20 days   ICU: 20d 5h     Active Hospital Problems    Diagnosis  POA    **Acute type 1 respiratory failure [J96.01]  Yes    Acute kidney injury superimposed on chronic kidney disease [N17.9, N18.9]  Yes    Dysphagia [R13.10]  Yes    Dementia [F03.90]  Yes    Protein calorie malnutrition [E46]  Yes    J15.0, ESBL Klebsiella pneumonia [J15.0]  Yes    Essential hypertension [I10]  Yes    Type 2 diabetes mellitus with diabetic chronic kidney disease [E11.22]  Yes     Omkar is a 68 y.o. male admitted on 3/10/2024 with Healthcare-associated pneumonia [J18.9]  Acute respiratory failure with hypoxemia [J96.01]    Omkar Nava is a 68 y.o. male dependent for mobility, bathing and dressing, who is now admitted for his third admission in the last 2.5 months for an aspiration event with aspiration pneumonia.      On his previous admission a modified barium swallow did reveal moderate oral and mild pharyngeal dysphagia February 5, 2024.    Respiratory status deteriorated overnight on March 10 and he required intubation.  "     CTA of the chest was repeated and was negative for PE but revealed worsening bilateral pneumonia.      He was extubated on 3/24/24 but respiratory status remains tenuous.      NG/OG Tube Nasojejunal 12 Fr Right nostril-Tube Feeding Product: Isosource 1.5  Novasource Renal (2.0 kcal/mL, 91 g protein/L, no fiber)  [REMOVED] NG/OG Tube 16 Fr Left nostril-Tube Feeding Rate (mL/hr): 0 mL/HR  NG/OG Tube Nasojejunal 12 Fr Right nostril-Tube Feeding Rate (mL/hr): 47 mL/HR (Based on a feeding duration of 20 h/d)      Lab Results   Component Value Date    PREALBUMIN 13.8 (L) 03/25/2024    CRP 4.73 (H) 03/25/2024    CRP 0.59 (H) 08/02/2023    CRP 0.30 03/06/2023    CRP 0.95 (H) 12/07/2022       Lab Results   Lab Value Date/Time    HGBA1C 7.00 (H) 10/16/2022 0432    HGBA1C 8.7 (H) 06/25/2022 0242    HGBA1C 13.4 04/14/2022 1058     Results from last 7 days   Lab Units 03/30/24  1701 03/30/24  1148 03/30/24  0517 03/29/24  2340 03/29/24  1714 03/29/24  1123 03/29/24  0523 03/28/24  2343   GLUCOSE mg/dL 126 123 136* 153* 132* 124 144* 185*       Diet: NPO Diet NPO Type: Strict NPO   Advance Directives: Code Status and Medical Interventions:   Ordered at: 03/10/24 1302     Code Status (Patient has no pulse and is not breathing):    CPR (Attempt to Resuscitate)     Medical Interventions (Patient has pulse or is breathing):    Full Support        DVT prophylaxis:  Medical and mechanical DVT prophylaxis orders are present.    Plan  Continue Enteral Nutrition  Per SLT: \"Long term alternative method of nutrition pending GOC/POC\"  We don't expect dysphagia to improve any time soon.  Daughter is agreeable with PEG.  Dr. Brunner to potentially place PEG on Monday, 4/1/24  Palliative Care following patient.   Due to elevated K, today, low K formula.  [facility] goal of 47 ml/h  Goal: Glucose < 180 mg/dL  Insulin SQ - No changes in current doses.  GOC discussed with daughter who is POA on 03/26/24. Patient is weak and has " increased oral secretions and may require re-intubation. She wishes to proceed with intubating if he declines and would like for him to remain a Full Code. She does not think he would want a tracheostomy if it comes to that. With his underlying Alzheimer's disease, family is aware his overall condition may suffer from this hospitalization.  Disposition: Keep in ICU.  CM. Eventually he will need placement. Family does not want him back at [facility].  7. AM labs/CXR    Plan of care and goals reviewed during interdisciplinary rounds.  I discussed the patient's findings and my recommendations with family and nursing staff.     Ramonita Crocker, MSN, APRN, ACNPC-AG  Pulmonary and Critical Care Medicine  Electronically signed by NARGIS Oliver, 03/30/24, 6:00 PM EDT.     MDM:    Problem(s) High due to: Acute or Chronic illness or injury that may poses a threat to life or bodily function  Data: High due to: Review of prior external records from each unique source, Review of the result(s) of each unique test, Ordering of each unique test, and Discuss management or test interpretation with external physician or NPP (not separately reported)    High

## 2024-03-31 ENCOUNTER — APPOINTMENT (OUTPATIENT)
Dept: GENERAL RADIOLOGY | Facility: HOSPITAL | Age: 69
End: 2024-03-31
Payer: MEDICARE

## 2024-03-31 LAB
ALBUMIN SERPL-MCNC: 3 G/DL (ref 3.5–5.2)
ALBUMIN/GLOB SERPL: 1.1 G/DL
ALP SERPL-CCNC: 335 U/L (ref 39–117)
ALT SERPL W P-5'-P-CCNC: 254 U/L (ref 1–41)
ANION GAP SERPL CALCULATED.3IONS-SCNC: 8 MMOL/L (ref 5–15)
AST SERPL-CCNC: 136 U/L (ref 1–40)
BASOPHILS # BLD AUTO: 0.03 10*3/MM3 (ref 0–0.2)
BASOPHILS NFR BLD AUTO: 0.4 % (ref 0–1.5)
BILIRUB SERPL-MCNC: 0.2 MG/DL (ref 0–1.2)
BUN SERPL-MCNC: 52 MG/DL (ref 8–23)
BUN/CREAT SERPL: 49.1 (ref 7–25)
CALCIUM SPEC-SCNC: 8.1 MG/DL (ref 8.6–10.5)
CHLORIDE SERPL-SCNC: 107 MMOL/L (ref 98–107)
CO2 SERPL-SCNC: 30 MMOL/L (ref 22–29)
CREAT SERPL-MCNC: 1.06 MG/DL (ref 0.76–1.27)
DEPRECATED RDW RBC AUTO: 52 FL (ref 37–54)
EGFRCR SERPLBLD CKD-EPI 2021: 77.4 ML/MIN/1.73
EOSINOPHIL # BLD AUTO: 0.42 10*3/MM3 (ref 0–0.4)
EOSINOPHIL NFR BLD AUTO: 5.2 % (ref 0.3–6.2)
ERYTHROCYTE [DISTWIDTH] IN BLOOD BY AUTOMATED COUNT: 15.6 % (ref 12.3–15.4)
GLOBULIN UR ELPH-MCNC: 2.8 GM/DL
GLUCOSE BLDC GLUCOMTR-MCNC: 108 MG/DL (ref 70–130)
GLUCOSE BLDC GLUCOMTR-MCNC: 113 MG/DL (ref 70–130)
GLUCOSE BLDC GLUCOMTR-MCNC: 126 MG/DL (ref 70–130)
GLUCOSE BLDC GLUCOMTR-MCNC: 156 MG/DL (ref 70–130)
GLUCOSE BLDC GLUCOMTR-MCNC: 156 MG/DL (ref 70–130)
GLUCOSE BLDC GLUCOMTR-MCNC: 54 MG/DL (ref 70–130)
GLUCOSE BLDC GLUCOMTR-MCNC: 58 MG/DL (ref 70–130)
GLUCOSE BLDC GLUCOMTR-MCNC: 80 MG/DL (ref 70–130)
GLUCOSE SERPL-MCNC: 133 MG/DL (ref 65–99)
HCT VFR BLD AUTO: 27.3 % (ref 37.5–51)
HGB BLD-MCNC: 8.3 G/DL (ref 13–17.7)
IMM GRANULOCYTES # BLD AUTO: 0.05 10*3/MM3 (ref 0–0.05)
IMM GRANULOCYTES NFR BLD AUTO: 0.6 % (ref 0–0.5)
LYMPHOCYTES # BLD AUTO: 0.98 10*3/MM3 (ref 0.7–3.1)
LYMPHOCYTES NFR BLD AUTO: 12 % (ref 19.6–45.3)
MAGNESIUM SERPL-MCNC: 2.3 MG/DL (ref 1.6–2.4)
MCH RBC QN AUTO: 27.9 PG (ref 26.6–33)
MCHC RBC AUTO-ENTMCNC: 30.4 G/DL (ref 31.5–35.7)
MCV RBC AUTO: 91.6 FL (ref 79–97)
MONOCYTES # BLD AUTO: 0.48 10*3/MM3 (ref 0.1–0.9)
MONOCYTES NFR BLD AUTO: 5.9 % (ref 5–12)
NEUTROPHILS NFR BLD AUTO: 6.18 10*3/MM3 (ref 1.7–7)
NEUTROPHILS NFR BLD AUTO: 75.9 % (ref 42.7–76)
NRBC BLD AUTO-RTO: 0 /100 WBC (ref 0–0.2)
PHOSPHATE SERPL-MCNC: 3.8 MG/DL (ref 2.5–4.5)
PLATELET # BLD AUTO: 369 10*3/MM3 (ref 140–450)
PMV BLD AUTO: 11.8 FL (ref 6–12)
POTASSIUM SERPL-SCNC: 4.8 MMOL/L (ref 3.5–5.2)
PROT SERPL-MCNC: 5.8 G/DL (ref 6–8.5)
RBC # BLD AUTO: 2.98 10*6/MM3 (ref 4.14–5.8)
SODIUM SERPL-SCNC: 145 MMOL/L (ref 136–145)
WBC NRBC COR # BLD AUTO: 8.14 10*3/MM3 (ref 3.4–10.8)

## 2024-03-31 PROCEDURE — 25010000002 HEPARIN (PORCINE) PER 1000 UNITS: Performed by: INTERNAL MEDICINE

## 2024-03-31 PROCEDURE — 85025 COMPLETE CBC W/AUTO DIFF WBC: CPT

## 2024-03-31 PROCEDURE — 71045 X-RAY EXAM CHEST 1 VIEW: CPT

## 2024-03-31 PROCEDURE — 63710000001 INSULIN DETEMIR PER 5 UNITS: Performed by: INTERNAL MEDICINE

## 2024-03-31 PROCEDURE — 94799 UNLISTED PULMONARY SVC/PX: CPT

## 2024-03-31 PROCEDURE — 80053 COMPREHEN METABOLIC PANEL: CPT

## 2024-03-31 PROCEDURE — 84100 ASSAY OF PHOSPHORUS: CPT

## 2024-03-31 PROCEDURE — 82948 REAGENT STRIP/BLOOD GLUCOSE: CPT

## 2024-03-31 PROCEDURE — 83735 ASSAY OF MAGNESIUM: CPT

## 2024-03-31 PROCEDURE — 94664 DEMO&/EVAL PT USE INHALER: CPT

## 2024-03-31 PROCEDURE — 63710000001 INSULIN REGULAR HUMAN PER 5 UNITS: Performed by: INTERNAL MEDICINE

## 2024-03-31 PROCEDURE — 99233 SBSQ HOSP IP/OBS HIGH 50: CPT | Performed by: INTERNAL MEDICINE

## 2024-03-31 RX ADMIN — BRIMONIDINE TARTRATE 1 DROP: 2 SOLUTION/ DROPS OPHTHALMIC at 08:37

## 2024-03-31 RX ADMIN — HUMAN INSULIN 6 UNITS: 100 INJECTION, SOLUTION SUBCUTANEOUS at 12:29

## 2024-03-31 RX ADMIN — TERAZOSIN HYDROCHLORIDE ANHYDROUS 5 MG: 5 CAPSULE ORAL at 22:06

## 2024-03-31 RX ADMIN — FLUOXETINE 20 MG: 20 CAPSULE ORAL at 09:59

## 2024-03-31 RX ADMIN — TERAZOSIN HYDROCHLORIDE ANHYDROUS 5 MG: 5 CAPSULE ORAL at 09:59

## 2024-03-31 RX ADMIN — ALBUTEROL SULFATE 2.5 MG: 2.5 SOLUTION RESPIRATORY (INHALATION) at 06:54

## 2024-03-31 RX ADMIN — TORSEMIDE 5 MG: 10 TABLET ORAL at 09:59

## 2024-03-31 RX ADMIN — Medication 30 ML: at 22:06

## 2024-03-31 RX ADMIN — HEPARIN SODIUM 5000 UNITS: 5000 INJECTION INTRAVENOUS; SUBCUTANEOUS at 22:06

## 2024-03-31 RX ADMIN — HYDRALAZINE HYDROCHLORIDE 100 MG: 50 TABLET ORAL at 05:32

## 2024-03-31 RX ADMIN — VALPROIC ACID 150 MG: 250 SOLUTION ORAL at 22:06

## 2024-03-31 RX ADMIN — HYDRALAZINE HYDROCHLORIDE 100 MG: 50 TABLET ORAL at 14:11

## 2024-03-31 RX ADMIN — ACETAMINOPHEN 650 MG: 650 SOLUTION ORAL at 14:11

## 2024-03-31 RX ADMIN — CARVEDILOL 12.5 MG: 12.5 TABLET, FILM COATED ORAL at 10:00

## 2024-03-31 RX ADMIN — LANSOPRAZOLE 30 MG: 15 TABLET, ORALLY DISINTEGRATING ORAL at 05:32

## 2024-03-31 RX ADMIN — BRIMONIDINE TARTRATE 1 DROP: 2 SOLUTION/ DROPS OPHTHALMIC at 22:05

## 2024-03-31 RX ADMIN — CLONIDINE HYDROCHLORIDE 0.2 MG: 0.2 TABLET ORAL at 05:32

## 2024-03-31 RX ADMIN — ALBUTEROL SULFATE 2.5 MG: 2.5 SOLUTION RESPIRATORY (INHALATION) at 19:47

## 2024-03-31 RX ADMIN — CLONIDINE HYDROCHLORIDE 0.2 MG: 0.2 TABLET ORAL at 22:06

## 2024-03-31 RX ADMIN — ALBUTEROL SULFATE 2.5 MG: 2.5 SOLUTION RESPIRATORY (INHALATION) at 14:21

## 2024-03-31 RX ADMIN — HYDRALAZINE HYDROCHLORIDE 100 MG: 50 TABLET ORAL at 22:06

## 2024-03-31 RX ADMIN — VALPROIC ACID 150 MG: 250 SOLUTION ORAL at 05:40

## 2024-03-31 RX ADMIN — DEXTROSE MONOHYDRATE 25 G: 25 INJECTION, SOLUTION INTRAVENOUS at 22:01

## 2024-03-31 RX ADMIN — TIMOLOL MALEATE 1 DROP: 5 SOLUTION/ DROPS OPHTHALMIC at 08:37

## 2024-03-31 RX ADMIN — TIMOLOL MALEATE 1 DROP: 5 SOLUTION/ DROPS OPHTHALMIC at 22:05

## 2024-03-31 RX ADMIN — HEPARIN SODIUM 5000 UNITS: 5000 INJECTION INTRAVENOUS; SUBCUTANEOUS at 05:32

## 2024-03-31 RX ADMIN — CARVEDILOL 12.5 MG: 12.5 TABLET, FILM COATED ORAL at 22:06

## 2024-03-31 RX ADMIN — VALPROIC ACID 150 MG: 250 SOLUTION ORAL at 14:11

## 2024-03-31 RX ADMIN — HUMAN INSULIN 6 UNITS: 100 INJECTION, SOLUTION SUBCUTANEOUS at 18:34

## 2024-03-31 RX ADMIN — HUMAN INSULIN 6 UNITS: 100 INJECTION, SOLUTION SUBCUTANEOUS at 06:40

## 2024-03-31 RX ADMIN — AMLODIPINE BESYLATE 10 MG: 10 TABLET ORAL at 10:00

## 2024-03-31 RX ADMIN — BRIMONIDINE TARTRATE 1 DROP: 2 SOLUTION/ DROPS OPHTHALMIC at 16:03

## 2024-03-31 RX ADMIN — CLONIDINE HYDROCHLORIDE 0.2 MG: 0.2 TABLET ORAL at 14:11

## 2024-03-31 RX ADMIN — INSULIN DETEMIR 8 UNITS: 100 INJECTION, SOLUTION SUBCUTANEOUS at 09:59

## 2024-03-31 RX ADMIN — ALBUTEROL SULFATE 2.5 MG: 2.5 SOLUTION RESPIRATORY (INHALATION) at 01:56

## 2024-03-31 RX ADMIN — HUMAN INSULIN 2 UNITS: 100 INJECTION, SOLUTION SUBCUTANEOUS at 06:40

## 2024-03-31 RX ADMIN — ARIPIPRAZOLE 5 MG: 10 TABLET ORAL at 10:00

## 2024-03-31 RX ADMIN — HEPARIN SODIUM 5000 UNITS: 5000 INJECTION INTRAVENOUS; SUBCUTANEOUS at 14:11

## 2024-03-31 RX ADMIN — Medication 30 ML: at 08:36

## 2024-03-31 RX ADMIN — FLUOXETINE 20 MG: 20 CAPSULE ORAL at 22:06

## 2024-03-31 NOTE — PROGRESS NOTES
"INTENSIVIST   PROGRESS NOTE     Hospital:  LOS: 21 days     Chief Complaint: Shortness of breath    Subjective   S     Interval History: No acute issues overnight.  Afebrile, hemodynamically stable.  Family at bedside who notes overall stability the last few days.    The patient's relevant past medical, surgical and social history were reviewed and updated in Epic as appropriate.      Objective   O     Intake/Ouptut 24 hrs (7:00AM - 6:59 AM)  Intake & Output (last 3 days)         03/28 0701 03/29 0700 03/29 0701 03/30 0700 03/30 0701 03/31 0700 03/31 0701 04/01 0700    Blood 757       Other 350 360 360 175    NG/GT 1887 1229 863 452    Total Intake(mL/kg) 2994 (32.9) 1589 (17.5) 1223 (13.5) 627 (6.9)    Urine (mL/kg/hr) 3600 (1.6) 1450 (0.7) 2100 (1) 700 (1)    Stool 400 0 350     Total Output 4000 1450 2450 700    Net -1006 +139 -1227 -73            Stool Unmeasured Occurrence 1 x        Respiratory Support: NC    Physical Examination:  Vital Signs: Blood pressure 148/83, pulse 66, temperature 100.1 °F (37.8 °C), temperature source Oral, resp. rate 20, height 182.9 cm (72\"), weight 90.8 kg (200 lb 2.8 oz), SpO2 93%.    General: The patient appears in no acute distress.   Chest: Coarse breath sounds throughout, likely transmitted from upper airway secretions.  No rhonchi bases.  Appropriate oxygen saturations on nasal cannula.  Cardiac: Regular rhythm, normal rate, S1S2 auscultated. No murmurs, rubs or gallops.   Extremities: 1+ lower extremity edema. No clubbing or cyanosis.  Neuro: Awake, alert but not oriented.  Localizes to loud voice and follows commands.    Lines, Drains & Airways       Active LDAs       Name Placement date Placement time Site Days    CVC Triple Lumen 03/10/24 Left Internal jugular 03/10/24  2200  Internal jugular  20    NG/OG Tube Nasojejunal 12 Fr Right nostril 03/14/24  1740  Right nostril  16    Rectal Tube With balloon 03/21/24  1430  --  10    External Urinary Catheter 03/27/24  0850 "  --  4             Results from last 7 days   Lab Units 03/31/24  0438 03/30/24  0528 03/29/24  1216 03/29/24  0544   WBC 10*3/mm3 8.14 8.81  --  8.86   HEMOGLOBIN g/dL 8.3* 8.7* 8.8* 8.7*   MCV fL 91.6 92.0  --  90.3   PLATELETS 10*3/mm3 369 405  --  400     Results from last 7 days   Lab Units 03/31/24  0438 03/30/24  0528 03/29/24  1216 03/29/24  0544 03/28/24  0525 03/27/24  0419   SODIUM mmol/L 145 142 145 143   < > 143   POTASSIUM mmol/L 4.8 4.8 5.0 5.4*   < > 4.4   CO2 mmol/L 30.0* 29.0 30.0* 28.0   < > 28.0   CREATININE mg/dL 1.06 0.99 1.20 1.12   < > 0.95   GLUCOSE mg/dL 133* 142* 134* 158*   < > 162*   MAGNESIUM mg/dL 2.3  --   --  2.4  --  2.2   PHOSPHORUS mg/dL 3.8  --   --  4.1  --  2.9    < > = values in this interval not displayed.     Estimated Creatinine Clearance: 88 mL/min (by C-G formula based on SCr of 1.06 mg/dL).  Results from last 7 days   Lab Units 03/31/24 0438 03/25/24  0027   ALK PHOS U/L 335* 333*   BILIRUBIN mg/dL 0.2 <0.2   ALT (SGPT) U/L 254* 380*   AST (SGOT) U/L 136* 276*     Results from last 7 days   Lab Units 03/24/24  1725   PH, ARTERIAL pH units 7.414   PCO2, ARTERIAL mm Hg 44.6   PO2 ART mm Hg 81.7*   FIO2 % 28     Images:  XR Chest 1 View    Result Date: 3/31/2024  Impression: 1.Vague bilateral airspace disease may represent edema or multifocal infiltrates. This appears similar or slightly worse since 3/29/2024. Electronically Signed: Ryan Perales MD  3/31/2024 10:22 AM EDT  Workstation ID: [id number]     Results: Reviewed.  I reviewed the patient's new laboratory and imaging results.  I independently reviewed the patient's new images.    Medications: Reviewed.    Assessment & Plan    A / P     Active Hospital Problems    Diagnosis     **Acute type 1 respiratory failure     Acute kidney injury superimposed on chronic kidney disease     Dysphagia     Dementia     Protein calorie malnutrition     J15.0, ESBL Klebsiella pneumonia     Essential hypertension     Type 2 diabetes  mellitus with diabetic chronic kidney disease      Omkar is a 68M admitted on 3/10/2024 with hypoxic respiratory failure secondary to bacterial pneumonia.     # Respiratory failure: Completed a ertapenem x 10 days for Klebsiella pneumonia (3/25/2024).  Titrating supplemental O2 to maintain SpO2 greater than 88%.  Scheduled albuterol torsemide  # Alzheimer's disease, psychosis, dysphagia: Working with PT/OT.  Tentatively planning on PEG over the next 24 to 48 hours.  Continue tube feeds; n.p.o. at midnight    Pre-existing/complicating conditions:  # Hypertension: Continue clonidine, Norvasc  # BPH: Terazosin  # Mood instability: Continue Prozac  # Diabetes scheduled, as needed insulin (per protocol).  Adjusting daily    Continue close hemodynamic monitoring in the ICU.  Pending PEG and ongoing GOC can likely transition out of the ICU.  Till then monitoring daily labs and imaging as needed.    F-Tube feeds per dietary recs  A-NA  S-NA  T-Heparin SQ  H-Head of bed greater than 30 degrees  U-PPI  G-FSBS per unit protocol, correction dose insulin  S-NA  B-Last bowel movement  I-PIV  D-NA    Advance Directives:   Code Status and Medical Interventions:   Ordered at: 03/10/24 1302     Code Status (Patient has no pulse and is not breathing):    CPR (Attempt to Resuscitate)     Medical Interventions (Patient has pulse or is breathing):    Full Support     High level of risk due to severe exacerbation of chronic illness and illness with threat to life or bodily function.    I conducted multidisciplinary rounds in the plan of care was discussed with the multidisciplinary team at that time. In attendance at multidisciplinary rounds was clinical pharmacist, dietitian, nursing staff and case management.    I discussed the patient's findings and my recommendations with patient, family, and nursing staff    -- Shawn Gonzalez MD  Pulmonary/Critical Care

## 2024-04-01 ENCOUNTER — ANESTHESIA EVENT (OUTPATIENT)
Dept: GASTROENTEROLOGY | Facility: HOSPITAL | Age: 69
End: 2024-04-01
Payer: MEDICARE

## 2024-04-01 ENCOUNTER — ANESTHESIA (OUTPATIENT)
Dept: GASTROENTEROLOGY | Facility: HOSPITAL | Age: 69
End: 2024-04-01
Payer: MEDICARE

## 2024-04-01 LAB
ALBUMIN SERPL-MCNC: 2.8 G/DL (ref 3.5–5.2)
ALBUMIN/GLOB SERPL: 0.7 G/DL
ALP SERPL-CCNC: 414 U/L (ref 39–117)
ALT SERPL W P-5'-P-CCNC: 285 U/L (ref 1–41)
ANION GAP SERPL CALCULATED.3IONS-SCNC: 6 MMOL/L (ref 5–15)
AST SERPL-CCNC: 156 U/L (ref 1–40)
BASOPHILS # BLD AUTO: 0.06 10*3/MM3 (ref 0–0.2)
BASOPHILS NFR BLD AUTO: 0.8 % (ref 0–1.5)
BILIRUB SERPL-MCNC: 0.2 MG/DL (ref 0–1.2)
BUN SERPL-MCNC: 58 MG/DL (ref 8–23)
BUN/CREAT SERPL: 47.9 (ref 7–25)
CALCIUM SPEC-SCNC: 8.4 MG/DL (ref 8.6–10.5)
CHLORIDE SERPL-SCNC: 107 MMOL/L (ref 98–107)
CO2 SERPL-SCNC: 29 MMOL/L (ref 22–29)
CREAT SERPL-MCNC: 1.21 MG/DL (ref 0.76–1.27)
CRP SERPL-MCNC: 2.69 MG/DL (ref 0–0.5)
DEPRECATED RDW RBC AUTO: 52.2 FL (ref 37–54)
EGFRCR SERPLBLD CKD-EPI 2021: 66 ML/MIN/1.73
EOSINOPHIL # BLD AUTO: 0.37 10*3/MM3 (ref 0–0.4)
EOSINOPHIL NFR BLD AUTO: 4.7 % (ref 0.3–6.2)
ERYTHROCYTE [DISTWIDTH] IN BLOOD BY AUTOMATED COUNT: 15.7 % (ref 12.3–15.4)
GLOBULIN UR ELPH-MCNC: 3.8 GM/DL
GLUCOSE BLDC GLUCOMTR-MCNC: 103 MG/DL (ref 70–130)
GLUCOSE BLDC GLUCOMTR-MCNC: 112 MG/DL (ref 70–130)
GLUCOSE BLDC GLUCOMTR-MCNC: 134 MG/DL (ref 70–130)
GLUCOSE BLDC GLUCOMTR-MCNC: 150 MG/DL (ref 70–130)
GLUCOSE BLDC GLUCOMTR-MCNC: 187 MG/DL (ref 70–130)
GLUCOSE BLDC GLUCOMTR-MCNC: 62 MG/DL (ref 70–130)
GLUCOSE BLDC GLUCOMTR-MCNC: 83 MG/DL (ref 70–130)
GLUCOSE BLDC GLUCOMTR-MCNC: 89 MG/DL (ref 70–130)
GLUCOSE BLDC GLUCOMTR-MCNC: 92 MG/DL (ref 70–130)
GLUCOSE BLDC GLUCOMTR-MCNC: 97 MG/DL (ref 70–130)
GLUCOSE SERPL-MCNC: 141 MG/DL (ref 65–99)
HCT VFR BLD AUTO: 27.3 % (ref 37.5–51)
HGB BLD-MCNC: 8.3 G/DL (ref 13–17.7)
IMM GRANULOCYTES # BLD AUTO: 0.05 10*3/MM3 (ref 0–0.05)
IMM GRANULOCYTES NFR BLD AUTO: 0.6 % (ref 0–0.5)
LYMPHOCYTES # BLD AUTO: 1.21 10*3/MM3 (ref 0.7–3.1)
LYMPHOCYTES NFR BLD AUTO: 15.3 % (ref 19.6–45.3)
MAGNESIUM SERPL-MCNC: 2.6 MG/DL (ref 1.6–2.4)
MCH RBC QN AUTO: 27.7 PG (ref 26.6–33)
MCHC RBC AUTO-ENTMCNC: 30.4 G/DL (ref 31.5–35.7)
MCV RBC AUTO: 91 FL (ref 79–97)
MONOCYTES # BLD AUTO: 0.52 10*3/MM3 (ref 0.1–0.9)
MONOCYTES NFR BLD AUTO: 6.6 % (ref 5–12)
NEUTROPHILS NFR BLD AUTO: 5.72 10*3/MM3 (ref 1.7–7)
NEUTROPHILS NFR BLD AUTO: 72 % (ref 42.7–76)
NRBC BLD AUTO-RTO: 0 /100 WBC (ref 0–0.2)
PHOSPHATE SERPL-MCNC: 3.9 MG/DL (ref 2.5–4.5)
PLATELET # BLD AUTO: 338 10*3/MM3 (ref 140–450)
PMV BLD AUTO: 11.8 FL (ref 6–12)
POTASSIUM SERPL-SCNC: 4.9 MMOL/L (ref 3.5–5.2)
PREALB SERPL-MCNC: 23.4 MG/DL (ref 20–40)
PROT SERPL-MCNC: 6.6 G/DL (ref 6–8.5)
RBC # BLD AUTO: 3 10*6/MM3 (ref 4.14–5.8)
SODIUM SERPL-SCNC: 142 MMOL/L (ref 136–145)
TRIGL SERPL-MCNC: 32 MG/DL (ref 0–150)
WBC NRBC COR # BLD AUTO: 7.93 10*3/MM3 (ref 3.4–10.8)

## 2024-04-01 PROCEDURE — 63710000001 INSULIN REGULAR HUMAN PER 5 UNITS: Performed by: INTERNAL MEDICINE

## 2024-04-01 PROCEDURE — 25010000002 METOCLOPRAMIDE PER 10 MG: Performed by: NURSE PRACTITIONER

## 2024-04-01 PROCEDURE — 82948 REAGENT STRIP/BLOOD GLUCOSE: CPT

## 2024-04-01 PROCEDURE — 94799 UNLISTED PULMONARY SVC/PX: CPT

## 2024-04-01 PROCEDURE — 25010000002 PROPOFOL 10 MG/ML EMULSION: Performed by: NURSE ANESTHETIST, CERTIFIED REGISTERED

## 2024-04-01 PROCEDURE — 86140 C-REACTIVE PROTEIN: CPT | Performed by: INTERNAL MEDICINE

## 2024-04-01 PROCEDURE — 85025 COMPLETE CBC W/AUTO DIFF WBC: CPT | Performed by: INTERNAL MEDICINE

## 2024-04-01 PROCEDURE — 99223 1ST HOSP IP/OBS HIGH 75: CPT | Performed by: NURSE PRACTITIONER

## 2024-04-01 PROCEDURE — 63710000001 INSULIN DETEMIR PER 5 UNITS: Performed by: INTERNAL MEDICINE

## 2024-04-01 PROCEDURE — 84478 ASSAY OF TRIGLYCERIDES: CPT | Performed by: INTERNAL MEDICINE

## 2024-04-01 PROCEDURE — 80053 COMPREHEN METABOLIC PANEL: CPT | Performed by: INTERNAL MEDICINE

## 2024-04-01 PROCEDURE — 25010000002 HEPARIN (PORCINE) PER 1000 UNITS: Performed by: INTERNAL MEDICINE

## 2024-04-01 PROCEDURE — 25810000003 LACTATED RINGERS PER 1000 ML: Performed by: NURSE ANESTHETIST, CERTIFIED REGISTERED

## 2024-04-01 PROCEDURE — 99232 SBSQ HOSP IP/OBS MODERATE 35: CPT | Performed by: INTERNAL MEDICINE

## 2024-04-01 PROCEDURE — 25810000003 DEXTROSE 5% IN LACTATED RINGERS PER 1000 ML

## 2024-04-01 PROCEDURE — 43246 EGD PLACE GASTROSTOMY TUBE: CPT | Performed by: INTERNAL MEDICINE

## 2024-04-01 PROCEDURE — 84134 ASSAY OF PREALBUMIN: CPT | Performed by: INTERNAL MEDICINE

## 2024-04-01 PROCEDURE — 83735 ASSAY OF MAGNESIUM: CPT | Performed by: INTERNAL MEDICINE

## 2024-04-01 PROCEDURE — 84100 ASSAY OF PHOSPHORUS: CPT | Performed by: INTERNAL MEDICINE

## 2024-04-01 PROCEDURE — 0DH63UZ INSERTION OF FEEDING DEVICE INTO STOMACH, PERCUTANEOUS APPROACH: ICD-10-PCS | Performed by: INTERNAL MEDICINE

## 2024-04-01 RX ORDER — METOCLOPRAMIDE HYDROCHLORIDE 5 MG/ML
10 INJECTION INTRAMUSCULAR; INTRAVENOUS ONCE
Status: COMPLETED | OUTPATIENT
Start: 2024-04-01 | End: 2024-04-01

## 2024-04-01 RX ORDER — DEXTROSE, SODIUM CHLORIDE, SODIUM LACTATE, POTASSIUM CHLORIDE, AND CALCIUM CHLORIDE 5; .6; .31; .03; .02 G/100ML; G/100ML; G/100ML; G/100ML; G/100ML
50 INJECTION, SOLUTION INTRAVENOUS CONTINUOUS
Status: DISCONTINUED | OUTPATIENT
Start: 2024-04-01 | End: 2024-04-01

## 2024-04-01 RX ORDER — PROPOFOL 10 MG/ML
VIAL (ML) INTRAVENOUS AS NEEDED
Status: DISCONTINUED | OUTPATIENT
Start: 2024-04-01 | End: 2024-04-01 | Stop reason: SURG

## 2024-04-01 RX ORDER — LIDOCAINE HYDROCHLORIDE 10 MG/ML
INJECTION, SOLUTION EPIDURAL; INFILTRATION; INTRACAUDAL; PERINEURAL AS NEEDED
Status: DISCONTINUED | OUTPATIENT
Start: 2024-04-01 | End: 2024-04-01 | Stop reason: SURG

## 2024-04-01 RX ORDER — SODIUM CHLORIDE, SODIUM LACTATE, POTASSIUM CHLORIDE, CALCIUM CHLORIDE 600; 310; 30; 20 MG/100ML; MG/100ML; MG/100ML; MG/100ML
INJECTION, SOLUTION INTRAVENOUS CONTINUOUS PRN
Status: DISCONTINUED | OUTPATIENT
Start: 2024-04-01 | End: 2024-04-01 | Stop reason: SURG

## 2024-04-01 RX ADMIN — DEXTROSE MONOHYDRATE 25 G: 25 INJECTION, SOLUTION INTRAVENOUS at 11:31

## 2024-04-01 RX ADMIN — SODIUM CHLORIDE, POTASSIUM CHLORIDE, SODIUM LACTATE AND CALCIUM CHLORIDE: 600; 310; 30; 20 INJECTION, SOLUTION INTRAVENOUS at 14:20

## 2024-04-01 RX ADMIN — CARVEDILOL 12.5 MG: 12.5 TABLET, FILM COATED ORAL at 08:21

## 2024-04-01 RX ADMIN — VALPROIC ACID 150 MG: 250 SOLUTION ORAL at 21:35

## 2024-04-01 RX ADMIN — METOCLOPRAMIDE 10 MG: 5 INJECTION, SOLUTION INTRAMUSCULAR; INTRAVENOUS at 09:50

## 2024-04-01 RX ADMIN — BRIMONIDINE TARTRATE 1 DROP: 2 SOLUTION/ DROPS OPHTHALMIC at 15:55

## 2024-04-01 RX ADMIN — INSULIN DETEMIR 8 UNITS: 100 INJECTION, SOLUTION SUBCUTANEOUS at 20:23

## 2024-04-01 RX ADMIN — HYDRALAZINE HYDROCHLORIDE 100 MG: 50 TABLET ORAL at 21:35

## 2024-04-01 RX ADMIN — HUMAN INSULIN 2 UNITS: 100 INJECTION, SOLUTION SUBCUTANEOUS at 23:47

## 2024-04-01 RX ADMIN — CLONIDINE HYDROCHLORIDE 0.2 MG: 0.2 TABLET ORAL at 05:34

## 2024-04-01 RX ADMIN — ALBUTEROL SULFATE 2.5 MG: 2.5 SOLUTION RESPIRATORY (INHALATION) at 06:35

## 2024-04-01 RX ADMIN — TORSEMIDE 5 MG: 10 TABLET ORAL at 09:50

## 2024-04-01 RX ADMIN — HYDRALAZINE HYDROCHLORIDE 100 MG: 50 TABLET ORAL at 05:34

## 2024-04-01 RX ADMIN — FLUOXETINE 20 MG: 20 CAPSULE ORAL at 20:25

## 2024-04-01 RX ADMIN — CARVEDILOL 12.5 MG: 12.5 TABLET, FILM COATED ORAL at 20:24

## 2024-04-01 RX ADMIN — PROPOFOL 30 MG: 10 INJECTION, EMULSION INTRAVENOUS at 14:52

## 2024-04-01 RX ADMIN — AMLODIPINE BESYLATE 10 MG: 10 TABLET ORAL at 08:20

## 2024-04-01 RX ADMIN — CLONIDINE HYDROCHLORIDE 0.2 MG: 0.2 TABLET ORAL at 21:36

## 2024-04-01 RX ADMIN — HUMAN INSULIN 6 UNITS: 100 INJECTION, SOLUTION SUBCUTANEOUS at 05:34

## 2024-04-01 RX ADMIN — BRIMONIDINE TARTRATE 1 DROP: 2 SOLUTION/ DROPS OPHTHALMIC at 20:25

## 2024-04-01 RX ADMIN — FLUOXETINE 20 MG: 20 CAPSULE ORAL at 08:20

## 2024-04-01 RX ADMIN — VALPROIC ACID 150 MG: 250 SOLUTION ORAL at 15:51

## 2024-04-01 RX ADMIN — Medication 30 ML: at 08:19

## 2024-04-01 RX ADMIN — HEPARIN SODIUM 5000 UNITS: 5000 INJECTION INTRAVENOUS; SUBCUTANEOUS at 05:34

## 2024-04-01 RX ADMIN — PROPOFOL 30 MG: 10 INJECTION, EMULSION INTRAVENOUS at 14:43

## 2024-04-01 RX ADMIN — PROPOFOL 30 MG: 10 INJECTION, EMULSION INTRAVENOUS at 14:46

## 2024-04-01 RX ADMIN — Medication 30 ML: at 20:26

## 2024-04-01 RX ADMIN — ALBUTEROL SULFATE 2.5 MG: 2.5 SOLUTION RESPIRATORY (INHALATION) at 13:15

## 2024-04-01 RX ADMIN — TERAZOSIN HYDROCHLORIDE ANHYDROUS 5 MG: 5 CAPSULE ORAL at 20:24

## 2024-04-01 RX ADMIN — INSULIN DETEMIR 8 UNITS: 100 INJECTION, SOLUTION SUBCUTANEOUS at 08:19

## 2024-04-01 RX ADMIN — TIMOLOL MALEATE 1 DROP: 5 SOLUTION/ DROPS OPHTHALMIC at 08:21

## 2024-04-01 RX ADMIN — HEPARIN SODIUM 5000 UNITS: 5000 INJECTION INTRAVENOUS; SUBCUTANEOUS at 21:36

## 2024-04-01 RX ADMIN — BRIMONIDINE TARTRATE 1 DROP: 2 SOLUTION/ DROPS OPHTHALMIC at 08:21

## 2024-04-01 RX ADMIN — VALPROIC ACID 150 MG: 250 SOLUTION ORAL at 05:43

## 2024-04-01 RX ADMIN — ALBUTEROL SULFATE 2.5 MG: 2.5 SOLUTION RESPIRATORY (INHALATION) at 19:11

## 2024-04-01 RX ADMIN — LANSOPRAZOLE 30 MG: 15 TABLET, ORALLY DISINTEGRATING ORAL at 05:34

## 2024-04-01 RX ADMIN — ACETAMINOPHEN 650 MG: 650 SOLUTION ORAL at 21:34

## 2024-04-01 RX ADMIN — PROPOFOL 30 MG: 10 INJECTION, EMULSION INTRAVENOUS at 14:49

## 2024-04-01 RX ADMIN — SODIUM CHLORIDE, SODIUM LACTATE, POTASSIUM CHLORIDE, CALCIUM CHLORIDE AND DEXTROSE MONOHYDRATE 50 ML/HR: 5; 600; 310; 30; 20 INJECTION, SOLUTION INTRAVENOUS at 13:40

## 2024-04-01 RX ADMIN — TERAZOSIN HYDROCHLORIDE ANHYDROUS 5 MG: 5 CAPSULE ORAL at 08:20

## 2024-04-01 RX ADMIN — TIMOLOL MALEATE 1 DROP: 5 SOLUTION/ DROPS OPHTHALMIC at 20:25

## 2024-04-01 RX ADMIN — ALBUTEROL SULFATE 2.5 MG: 2.5 SOLUTION RESPIRATORY (INHALATION) at 02:23

## 2024-04-01 RX ADMIN — PROPOFOL 30 MG: 10 INJECTION, EMULSION INTRAVENOUS at 14:40

## 2024-04-01 RX ADMIN — LIDOCAINE HYDROCHLORIDE 80 MG: 10 INJECTION, SOLUTION EPIDURAL; INFILTRATION; INTRACAUDAL; PERINEURAL at 14:40

## 2024-04-01 RX ADMIN — ARIPIPRAZOLE 5 MG: 10 TABLET ORAL at 08:20

## 2024-04-01 RX ADMIN — HUMAN INSULIN 6 UNITS: 100 INJECTION, SOLUTION SUBCUTANEOUS at 23:47

## 2024-04-01 NOTE — PROGRESS NOTES
INTENSIVIST   PROGRESS NOTE     Hospital:  LOS: 22 days     Mr. Omkar Nava, 68 y.o. male is followed for a Chief Complaint of: Respiratory failure, pneumonia      Subjective   S     Interval History:  No acute events. Getting a PEG today.        The patient's relevant past medical, surgical and social history were reviewed and updated in Epic as appropriate.      ROS: Unable to obtain secondary to dementia.     Objective   O     Vitals:  Temp  Min: 97.6 °F (36.4 °C)  Max: 99.5 °F (37.5 °C)  BP  Min: 113/55  Max: 152/67  Pulse  Min: 50  Max: 68  Resp  Min: 16  Max: 28  SpO2  Min: 91 %  Max: 100 % Flow (L/min)  Min: 1  Max: 10    Intake/Ouptut 24 hrs (7:00AM - 6:59 AM)  Intake & Output (last 3 days)         03/15 0701  03/16 0700 03/16 0701  03/17 0700 03/17 0701  03/18 0700 03/18 0701  03/19 0700    I.V. (mL/kg) 1860.5 (20.4) 836.4 (8.5) 2531.7 (25.6) 398.6 (4)    Blood        Other 181 193 321 170    NG/ 637 722 243    IV Piggyback 52.7  278.5     Total Intake(mL/kg) 2652.2 (29.1) 1666.4 (16.9) 3853.2 (39) 811.6 (8.2)    Urine (mL/kg/hr) 3765 (1.7) 1815 (0.8) 1475 (0.6) 325 (0.4)    Stool   0     Total Output 3765 1815 1475 325    Net -1112.8 -148.6 +2378.2 +486.6            Stool Unmeasured Occurrence   3 x             Medications (drips):  dextrose 5 % and lactated Ringer's, Last Rate: 50 mL/hr (04/01/24 1340)          Physical Examination  Telemetry:  Normal sinus rhythm.    Constitutional:  No acute distress.  Resting in bed on nasal cannula.    Eyes: No scleral icterus.   PERRL, EOM intact.    Neck:  Supple, FROM   Cardiovascular: Normal rate, regular and rhythm. Normal heart sounds.  No murmurs, gallop or rub.   Respiratory: No respiratory distress. Normal respiratory effort.  Diminished. No wheezing.    Abdominal:  Soft. No masses. Nontender. No distension. No HSM.   Extremities: No digital cyanosis. No clubbing.  1+ peripheral edema.   Skin: No rashes, lesions or ulcers   Neurological:    Disoriented. Does not answer questions.              Results from last 7 days   Lab Units 04/01/24  0509 03/31/24  0438 03/30/24  0528   WBC 10*3/mm3 7.93 8.14 8.81   HEMOGLOBIN g/dL 8.3* 8.3* 8.7*   MCV fL 91.0 91.6 92.0   PLATELETS 10*3/mm3 338 369 405     Results from last 7 days   Lab Units 04/01/24  0509 03/31/24  0438 03/30/24  0528 03/29/24  1216 03/29/24  0544   SODIUM mmol/L 142 145 142   < > 143   POTASSIUM mmol/L 4.9 4.8 4.8   < > 5.4*   CO2 mmol/L 29.0 30.0* 29.0   < > 28.0   CREATININE mg/dL 1.21 1.06 0.99   < > 1.12   GLUCOSE mg/dL 141* 133* 142*   < > 158*   MAGNESIUM mg/dL 2.6* 2.3  --   --  2.4   PHOSPHORUS mg/dL 3.9 3.8  --   --  4.1    < > = values in this interval not displayed.     Estimated Creatinine Clearance: 77 mL/min (by C-G formula based on SCr of 1.21 mg/dL).  Results from last 7 days   Lab Units 04/01/24  0509 03/31/24  0438   ALK PHOS U/L 414* 335*   BILIRUBIN mg/dL 0.2 0.2   ALT (SGPT) U/L 285* 254*   AST (SGOT) U/L 156* 136*                 Images:  Imaging Results (Last 24 Hours)       ** No results found for the last 24 hours. **               Results: Reviewed.  I reviewed the patient's new laboratory and imaging results.  I independently reviewed the patient's new images.    Medications: Reviewed.    Assessment & Plan   A / P     Mr. Nava is a 68-year-old gentleman with diabetes mellitus complicated by blindness, osteomyelitis, chronic kidney disease, underlying dementia with psychosis, dependent for mobility, bathing and dressing, chronic anemia, heart failure with normal EF who is now admitted for his third admission in the last 2.5 months for an aspiration event with aspiration pneumonia.  On his previous admission a modified barium swallow did reveal moderate oral and mild pharyngeal dysphagia February 5, 2024.  Respiratory status deteriorated overnight on March 10 and he required intubation.  He initially was requiring 35% oxygen over March 13, 14th oxygenation worsened  and he required 80% FiO2.  CTA of the chest was repeated and was negative for PE but revealed worsening bilateral pneumonia.  He developed abdominal distention.  CTA of the abdomen revealed no bowel obstruction or ischemic bowel.       Respiratory cultures are growing ESBL Klebsiella and antibiotics were changed to Ertapenem on 3/16. He completed a 10 day course.     He was eventually extubated. Awaiting PEG tube placement.         Nutrition:   NPO Diet NPO Type: Strict NPO  Advance Directives:   Code Status and Medical Interventions:   Ordered at: 03/10/24 1302     Code Status (Patient has no pulse and is not breathing):    CPR (Attempt to Resuscitate)     Medical Interventions (Patient has pulse or is breathing):    Full Support       Active Hospital Problems    Diagnosis     **Acute type 1 respiratory failure     Acute kidney injury superimposed on chronic kidney disease     Dysphagia     Dementia     Protein calorie malnutrition     J15.0, ESBL Klebsiella pneumonia     Essential hypertension     Type 2 diabetes mellitus with diabetic chronic kidney disease        Assessment / Plan:    PEG tube placement today. Resume tube feeds after placement.   Continue to monitor respiratory status. At risk for failure secondary to inability to manage secretions.   Monitoring LFTs. Slowly increasing.   PT/OT  AM labs and CXR.       High level of risk due to:  severe exacerbation of chronic illness and illness with threat to life or bodily function.    Plan of care and goals reviewed during interdisciplinary rounds.  I discussed the patient's findings and my recommendations with nursing staff      Radha Wetzel,     Intensive Care Medicine and Pulmonary Medicine

## 2024-04-01 NOTE — PROGRESS NOTES
Multidisciplinary Rounds EN Review Note    Patient Name: Omkar Nava  Date of Encounter: 24 15:35 EDT  MRN: [id number]  Admission date: 3/10/2024    Reason for visit: EN review . RD to continue to follow per protocol.     EMR reviewed   Medication reviewed precedex,   Labs reviewed Phos 2.3, Creatinine 1.44, BUN 48    Estimated/Assessed Needs (3/25):      Energy: 1850 kcal   Protein: 102 g   Fluids: per clinical status    Additional Information Obtained:   Continues tolerating EN. PEG placed today.     Current diet: NPO Diet NPO Type: Strict NPO    EN: [employer] Renal  Goal Rate: 47 ml/hr    Water Flushes: 30 ml q 2 hr  Modular: Prosource-no carb 2/day  Route: NJ  Tube: Small bore    At goal over: 20Hrs/day    Rx will supply:   Goal Volume 800 mL/day     Flush Volume 300 mL/day     Energy 2000 Kcal/day 108 % Est Need   Protein 115 g/day 106 % Est Need   Fiber 0 g/day     Water in   mL     Total Water 977 mL     Meet DRI No        --------------------------------------------------------------------------  Product/Rate verified at bedside: Yes   Infusing Rate at time of visit: 40 ml/hr    Average Delivery from [facility] Day:(on hold for PEG)      Intervention:  Follow treatment plan  Care plan reviewed    Continue current EN regimen- once surgeon gives okay to use PEG re-initiate per order.     Follow up:   Per protocol      Chuyita Moreno RD, [facility]  15:38 EDT  Time: 25min

## 2024-04-01 NOTE — PLAN OF CARE
Goal Outcome Evaluation:  Plan of Care Reviewed With: patient        Progress: no change  Outcome Evaluation: Palliative RN saw pt on 4/1 at 0900.  He was asleep on RA and did not demonstrate any visible signs of discomfort.  Did not attmept to wake patient.  Pt. to get PEG today.  Palliative to continue to follow along for support, POC and ongoing GOC conversations.         1330 Palliative IDT meeting: DEVON Jorgensen RN, CHPN; CLIFF Buchanan MD.; GAVIN Jay MDiv; EDWARDO Pritchett APRN; DEVON Singh RN; ARA Jarrell RN, CHPN  After hours, weekends and holidays, contact Palliative Provider by calling [phone].

## 2024-04-01 NOTE — BRIEF OP NOTE
ESOPHAGOGASTRODUODENOSCOPY WITH PERCUTANEOUS ENDOSCOPIC GASTROSTOMY TUBE INSERTION  Progress Note    Omkar RALF Nava  4/1/2024    EGD is normal. PEG placed without difficulty. Tube marks 3.5 cm at the skin. External bolster adjusted loosely at 4.5 cm.     >> Resume enteral feeds per PEG  >> Recommend ENDOSCOPIC REMOVAL for this Avanos push-type 20 Fr PEG.    Mark I. Brunner, MD     Date: 4/1/2024  Time: 15:09 EDT

## 2024-04-01 NOTE — CONSULTS
[facility] Consult    Referring Provider: No ref. provider found    PCP: Deann Cao MD    Reason for Consultation: PEG request     Chief complaint: SOB    History of present illness:  Omkar Nava is a 68 y.o. male who is admitted with shortness of breath who developed respiratory failure. GI Re consulted for PEG request. Patient with advanced dementia and is no contributory ot history. Per chart review, patient was apparently choking at his SNF immediately prior to onset and was likely having worsening dysphagia at that time and ultimately required the Heimlich.  Throughout hospitalization, has had minimal improvement in pharyngeal dysphagia as noted in SLP evaluations.  No prior history of upper gut surgery.  No history of liver disease.  No reports of any N/V/D, abdominal pain, CP, or F/C.  No prior history of requirement of feeding tube insertion. Past medical, surgical, social, and family histories are reviewed for accuracy.  No documented alleviating or exacerbating factors.  Patient resting comfortably in bed at time of exam in no acute distress; language garbled-no indicators of pain.    Allergies:  Patient has no known allergies.    Scheduled Meds:  albuterol, 2.5 mg, Nebulization, Q6H - RT  amLODIPine, 10 mg, Nasogastric, Q24H  ARIPiprazole, 5 mg, Nasogastric, Daily  brimonidine, 1 drop, Both Eyes, TID   And  timolol, 1 drop, Both Eyes, BID  carvedilol, 12.5 mg, Nasogastric, Q12H  cloNIDine, 0.2 mg, Nasogastric, Q8H  FLUoxetine, 20 mg, Nasogastric, Q12H  heparin (porcine), 5,000 Units, Subcutaneous, Q8H  hydrALAZINE, 100 mg, Nasogastric, Q8H  insulin detemir, 8 Units, Subcutaneous, Q12H  insulin regular, 2-9 Units, Subcutaneous, Q6H  insulin regular, 6 Units, Subcutaneous, Q6H  lansoprazole, 30 mg, Nasogastric, Q AM  ProSource No Carb, 30 mL, Per G Tube, BID  terazosin, 5 mg, Nasogastric, Q12H  torsemide, 5 mg, Nasogastric, Daily  Valproic Acid, 150 mg, Nasogastric, Q8H          Infusions:       PRN Meds:    acetaminophen    dextrose    glucagon (human recombinant)    ipratropium-albuterol    labetalol    Potassium Replacement - Follow Nurse / BPA Driven Protocol    prochlorperazine    sennosides **AND** [DISCONTINUED] docusate sodium    Home Meds:  Medications Prior to Admission   Medication Sig Dispense Refill Last Dose    acetaminophen (TYLENOL) 325 MG tablet Take 2 tablets by mouth Every 6 (Six) Hours As Needed for Mild Pain, Headache or Fever.   Past Week    albuterol sulfate  (90 Base) MCG/ACT inhaler Inhale 2 puffs Every 4 (Four) Hours As Needed for Wheezing or Shortness of Air.   Past Week    amLODIPine (NORVASC) 10 MG tablet Take 1 tablet by mouth Daily.   3/9/2024    [] ARIPiprazole (ABILIFY) 5 MG tablet Take 1 tablet by mouth Daily for 30 days. 30 tablet 0 3/9/2024    atorvastatin (LIPITOR) 20 MG tablet Take 1 tablet by mouth Every Night.   3/9/2024    Benzalkonium Chloride (Remedy Antimicrobial Cleanser) 0.12 % liquid Apply to americo area topically as needed for preventative use for americo care after each incontinent episode and as needed.   3/9/2024    brimonidine-timolol (COMBIGAN) 0.2-0.5 % ophthalmic solution Administer 1 drop to both eyes 2 (Two) Times a Day.   3/9/2024    carvedilol (COREG) 12.5 MG tablet Take 1 tablet by mouth 2 (Two) Times a Day With Meals for 30 days. 60 tablet 0 3/9/2024    Cholecalciferol 50 MCG (2000 UT) tablet Take 2 tablets by mouth Every Morning.   3/9/2024    cloNIDine (CATAPRES) 0.1 MG tablet Take 1 tablet by mouth Every 6 (Six) Hours As Needed for High Blood Pressure. AS NEEDED FOR SBP GREATER THAN 160   Past Week    dimethicone (Remedy Nutrashield) 1 % cream Apply to buttocks/Americo area topically as needed for preventative Apply topically to buttocks/americo area.   Past Week    Dimethicone (Remedy Skin Repair) 1.5 % cream Apply to body topically as needed for dry skin every shift as needed.   Past Week    divalproex (DEPAKOTE) 125  MG DR tablet Take 3 tablets by mouth 2 (Two) Times a Day.   3/9/2024    docusate sodium (COLACE) 100 MG capsule Take 1 capsule by mouth 2 (Two) Times a Day As Needed for Constipation.   Past Week    famotidine (PEPCID) 20 MG tablet Take 1 tablet by mouth 2 (Two) Times a Day.   3/9/2024    ferrous sulfate 325 (65 FE) MG tablet Take 1 tablet by mouth Daily With Breakfast.   3/9/2024    FLUoxetine (PROzac) 20 MG capsule Take 1 capsule by mouth 2 (Two) Times a Day. Resume on 1/25/2024 30 capsule 0 3/9/2024    hydrALAZINE (APRESOLINE) 50 MG tablet Take 1 tablet by mouth 3 (Three) Times a Day for 30 days. 90 tablet 0 3/9/2024    Insulin Lispro (humaLOG) 100 UNIT/ML injection Inject 2-7 Units under the skin into the appropriate area as directed 4 (Four) Times a Day Before Meals & at Bedtime. (Patient taking differently: Inject  under the skin into the appropriate area as directed 4 (Four) Times a Day Before Meals & at Bedtime. Per sliding scale: If =0 units                                151-200=2 units                                201-250=3 units                                251-300=4 units                                301-350=5 units                                351-400=6 units                              If greater then 400 call MD and give 8 units SQ)   3/9/2024    melatonin 5 MG tablet tablet Take 1 tablet by mouth Every Night.   3/9/2024    QUEtiapine (SEROquel) 25 MG tablet Take 0.5 tablets by mouth Daily for 30 days. 15 tablet 0 3/9/2024    QUEtiapine (SEROquel) 25 MG tablet Take 1 tablet by mouth Every Night for 30 days. 30 tablet 0 3/9/2024    thiamine (VITAMIN B-1) 100 MG tablet  tablet Take 1 tablet by mouth 3 (Three) Times a Day.   3/9/2024    torsemide (DEMADEX) 10 MG tablet Take 1 tablet by mouth Daily.   3/9/2024    traZODone (DESYREL) 50 MG tablet Take 0.5 tablets by mouth Every Night for 30 days. 15 tablet 0 3/9/2024       ROS: Review of Systems   Unable to perform ROS: Dementia       PAST  "MED HX:  Past Medical History:   Diagnosis Date    Anemia     Dementia     Diabetes mellitus     Dysphagia     GERD (gastroesophageal reflux disease)     History of alcohol abuse     History of cocaine use     History of marijuana use     Hypertension     Osteomyelitis     Poor historian     records obtained from nursing home records & his family    Visual impairment        PAST SURG HX:  Past Surgical History:   Procedure Laterality Date    AMPUTATION FOOT Left 10/18/2022    Procedure: PARTIAL FIRST RAY AMPUTATION LEFT;  Surgeon: Yeison Petty MD;  Location:  [facility];  Service: Orthopedics;  Laterality: Left;    AMPUTATION FOOT Left 2022    Procedure: Transmetatarsal of Left Foot;  Surgeon: Yeison Petty MD;  Location:  [facility];  Service: Orthopedics;  Laterality: Left;    ENDOSCOPY N/A 3/14/2024    Procedure: ESOPHAGOGASTRODUODENOSCOPY WITH JEJUNAL TUBE INSERTION AT BEDSIDE;  Surgeon: Brunner, Mark I, MD;  Location: [facility] ENDOSCOPY;  Service: Gastroenterology;  Laterality: N/A;    EYE SURGERY         FAM HX:  Family History   Problem Relation Age of Onset    Diabetes Mother     Hypertension Mother     Hypertension Father     Diabetes Father     Diabetes Sister     Hypertension Brother     Diabetes Brother     Diabetes Maternal Grandmother     Diabetes Maternal Grandfather     Hypertension Brother     Diabetes Brother        SOC HX:  Social History     Socioeconomic History    Marital status: Single   Tobacco Use    Smoking status: Former     Current packs/day: 0.00     Average packs/day: 1 pack/day for 40.0 years (40.0 ttl pk-yrs)     Types: Cigarettes     Start date: 1977     Quit date: 2017     Years since quittin.9    Smokeless tobacco: Never   Vaping Use    Vaping status: Never Used   Substance and Sexual Activity    Alcohol use: Not Currently     Comment: histgry of alcohol abuse    Drug use: Yes     Types: Marijuana, \"Crack\" cocaine     Comment: PATIENT STATES \"IT'S BEEN A FEW DAYS\"    " "Sexual activity: Defer       PHYSICAL EXAM  /97   Pulse 60   Temp 98.6 °F (37 °C) (Axillary)   Resp 18   Ht 182.9 cm (72\")   Wt 90.7 kg (199 lb 15.3 oz)   SpO2 96%   BMI 27.12 kg/m²   Wt Readings from Last 3 Encounters:   04/01/24 90.7 kg (199 lb 15.3 oz)   02/22/24 84.8 kg (187 lb)   02/05/24 85.1 kg (187 lb 9.8 oz)   ,body mass index is 27.12 kg/m².  Physical Exam  Vitals and nursing note reviewed.   Constitutional:       General: He is not in acute distress.     Appearance: He is ill-appearing. He is not toxic-appearing.   HENT:      Head:      Comments: NJ tube in place   Cardiovascular:      Rate and Rhythm: Normal rate and regular rhythm.      Pulses: Normal pulses.      Heart sounds: Normal heart sounds.   Pulmonary:      Effort: Pulmonary effort is normal. No respiratory distress.      Breath sounds: Normal breath sounds.   Abdominal:      General: Abdomen is flat. There is distension.      Palpations: There is no mass.      Tenderness: There is no abdominal tenderness. There is no guarding or rebound.      Hernia: No hernia is present.   Musculoskeletal:         General: Normal range of motion.      Right lower leg: No edema.      Left lower leg: No edema.   Skin:     General: Skin is warm and dry.      Capillary Refill: Capillary refill takes less than 2 seconds.      Coloration: Skin is not jaundiced or pale.   Neurological:      Mental Status: He is alert. Mental status is at baseline.      Comments: Garbled, babbling speech   Psychiatric:      Comments: Flat, confused      Results Review:   I reviewed the patient's new clinical results.  I reviewed the patient's new imaging results and agree with the interpretation.  I reviewed the patient's other test results and agree with the interpretation  I personally viewed and interpreted the patient's EKG/Telemetry data    Lab Results   Component Value Date    WBC 7.93 04/01/2024    HGB 8.3 (L) 04/01/2024    HGB 8.3 (L) 03/31/2024    HGB 8.7 (L) " 03/30/2024    HCT 27.3 (L) 04/01/2024    MCV 91.0 04/01/2024     04/01/2024       Lab Results   Component Value Date    INR 1.05 03/29/2024    INR 1.38 (H) 03/12/2024    INR 1.08 10/18/2022       Lab Results   Component Value Date    GLUCOSE 141 (H) 04/01/2024    BUN 58 (H) 04/01/2024    CREATININE 1.21 04/01/2024    EGFRIFNONA >60 07/25/2022    EGFRIFAFRI >60 07/25/2022    BCR 47.9 (H) 04/01/2024     04/01/2024    K 4.9 04/01/2024    CO2 29.0 04/01/2024    CALCIUM 8.4 (L) 04/01/2024    ALBUMIN 2.8 (L) 04/01/2024    ALKPHOS 414 (H) 04/01/2024    BILITOT 0.2 04/01/2024    BILIDIR <0.2 03/14/2024     (H) 04/01/2024     (H) 04/01/2024     XR Chest 1 View  Result Date: 3/31/2024  XR CHEST 1 VW Date of Exam: 3/31/2024 5:09 AM EDT Indication: Follow up Comparison: 3/29/2024 Findings: Enteric tube descends below the diaphragm and beyond the field-of-view. Left IJ CVC overlies the SVC. Cardiac silhouette is magnified. Vague bilateral airspace disease may represent edema or multifocal infiltrates. No significant pleural effusion or pneumothorax. Osseous structures are unchanged.     Impression: 1.Vague bilateral airspace disease may represent edema or multifocal infiltrates. This appears similar or slightly worse since 3/29/2024. Electronically Signed: Ryan Perales MD  3/31/2024 10:22 AM EDT  Workstation ID: [id number]    CT Abdomen Pelvis With Contrast  Result Date: 3/14/2024  CT ABDOMEN PELVIS W CONTRAST, CT ANGIOGRAM CHEST Date of Exam: 3/14/2024 9:24 AM EDT Indication: bowel obstruction. Comparison: Chest and abdomen radiograph from earlier today, CT abdomen/pelvis from March 11, 2024, and CT chest from March 10, 2024 Technique: Axial CT images were obtained of the chest abdomen and pelvis following the uneventful intravenous administration of 85 mL Isovue-370. Reconstructed coronal and sagittal images were also obtained. Automated exposure control and iterative construction methods were used.  Findings: Chest: An endotracheal tube is in place. There are diffuse bilateral consolidations that have worsened from prior CT. There are small bilateral pleural effusions. The heart is enlarged. The great vessels are normal in caliber. A left internal jugular catheter has its tip at the mid SVC. There is no evidence of pulmonary embolus. There is mediastinal lymphadenopathy, likely reactive. No aggressive osseous lesions are identified. Abdomen/pelvis: The liver, gallbladder, adrenal glands, kidneys, spleen, and pancreas are unremarkable. An NG tube has its tip in the stomach. The stomach is mildly distended. The small bowel appears normal in caliber and configuration. The colon appears normal. The appendix appears normal. There is no ascites or loculated collection. No abnormally enlarged lymph nodes are identified. The rectum and prostate are unremarkable. A urinary Story catheter is in place. There is some wall thickening of the urinary bladder. No aggressive osseous lesions are identified.     Impression: 1.Negative for pulmonary embolus. 2.Diffuse bilateral consolidations have worsened from prior CT from March 10, 2024, suggesting pneumonia, pulmonary edema, and/or ARDS. 3.Small bilateral pleural effusions. 4.Cardiomegaly. 5.Mediastinal lymphadenopathy, likely reactive. 6.No evidence of bowel obstruction. 7.Some wall thickening of urinary bladder, which could represent cystitis. Electronically Signed: Omkar Umana MD  3/14/2024 10:21 AM EDT  Workstation ID: [id number]    US Renal Bilateral  Result Date: 3/11/2024  US RENAL BILATERAL Date of Exam: 3/11/2024 5:25 PM EDT Indication: renal failure. Comparison: CT abdomen pelvis 3/11/2024. Technique: Grayscale and color Doppler ultrasound evaluation of the kidneys and urinary bladder was performed. Findings: Right kidney measures 12.1 cm. Left kidney measures 11.3 cm. Bilaterally, there is normal corticomedullary differentiation and no evidence of hydronephrosis.  Urinary bladder is decompressed by Story catheter.     Impression: No hydronephrosis. Electronically Signed: Anjum Prado MD  3/11/2024 9:54 PM EDT  Workstation ID: [id number]    CT Abdomen Pelvis Without Contrast  Result Date: 3/11/2024  CT ABDOMEN PELVIS WO CONTRAST Date of Exam: 3/11/2024 1:17 AM EDT Indication: Flank pain, kidney stone suspected. Comparison: 3/10/2024. Technique: Axial CT images were obtained of the abdomen and pelvis without the administration of contrast. Reconstructed coronal and sagittal images were also obtained. Automated exposure control and iterative construction methods were used. Findings: Lung Bases:   Airspace consolidations present within the bilateral lower lobes with patchy airspace disease seen within the right middle lobe and the lingula. Small bilateral pleural effusions are present. Limited evaluation of the solid organs due to lack of intravenous contrast. Liver: Liver is normal in size and CT density. No focal lesions. Biliary/Gallbladder:  The gallbladder is normal without evidence of radiopaque stones. The biliary tree is nondilated. Spleen: Spleen is normal in size and CT density. Pancreas:  Pancreas is normal. There is no evidence of pancreatic mass or peripancreatic fluid. Kidneys:  Kidneys are normal in size. There are no stones or hydronephrosis. Residual contrast present within the collecting system. Adrenals:  Adrenal glands are unremarkable. Retroperitoneal/Lymph Nodes/Vasculature:  No retroperitoneal adenopathy is identified. Gastrointestinal/Mesentery:  The bowel loops are non-dilated without wall thickening or mass. The appendix appears within normal limits. No evidence of obstruction. No free air. No mesenteric fluid collections identified. Moderate to large stool burden present, most pronounced within the rectum. No evidence of hernia. Enteric tube present within the stomach. Oral contrast present. No evidence of extravasation. Small amount of free fluid is  seen tracking along the right paracolic gutter. There is diverticulosis of the descending colon. No definite inflammatory changes identified. No evidence of pneumatosis. Bladder:  The bladder is normal. Genital:   Unremarkable       Bony Structures:   Visualized bony structures are consistent with the patient's age.     Impression: 1.No acute intra-abdominal or intrapelvic process. 2.Moderate to large stool burden present, most pronounced within the rectum, consistent with constipation. 3.Small amount of free fluid seen tracking along the right paracolic gutter, which may related to third spacing of fluid. Reactive change cannot be excluded. No evidence of obstruction or obvious wall thickening. 4.Bibasilar pneumonia with small bilateral pleural effusions. 5.Ancillary findings as described above. Electronically Signed: Princess Jeong MD  3/11/2024 3:01 AM EDT  Workstation ID: [id number]    CT Head Without Contrast  Result Date: 3/11/2024  CT HEAD WO CONTRAST Date of Exam: 3/11/2024 1:17 AM EDT Indication: Mental status change, unknown cause. Comparison: 2/5/2024. Technique: Axial CT images were obtained of the head without contrast administration.  Automated exposure control and iterative construction methods were used. Findings: There is no evidence of hemorrhage. There is no mass effect or midline shift. There is no extracerebral collection. Ventricles are normal in size and configuration for patient's stated age.  Posterior fossa is within normal limits. Calvarium and skull base appear intact.   Visualized sinuses show no air fluid levels. Visualized orbits are unremarkable.     Impression: No acute intracranial process identified. Electronically Signed: Princess Jeong MD  3/11/2024 2:57 AM EDT  Workstation ID: [id number]    COVID19   Date Value Ref Range Status   03/10/2024 Not Detected Not Detected - Ref. Range Final     ASSESSMENTS/PLANS  1. Respiratory failure, currently on 1L NC  2. Dysphagia, severe pharyngeal   3.  Encounter for Feeding Tube placement, feeding difficulties in the adult  4. Alzheimer's Disease   5. T2DM    Omkar Nava is a 68 y.o. male who presented to hospital with significant shortness of breath and choking who developed respiratory failure and required intubation. Per report, there was concerns of choking and patient underwent heimlich maneuver at outside SNF highly suggestive of ongoing issues with dysphagia for some time now. Given patients history of Alzheimer's, I do not foresee much meaningful improvement in terms of dysphagia going forward and concur with SLP recommendations for alternate routes of nutrition as per their most recent evaluations.    >>> NPO; Hold TFs.  >>> Recommend esophagogastroduodenoscopy with percutaneous endoscopic gastrostomy tube placement   >>> IV Reglan 10 mg x 1 as patient had TFs this AM (noted jejunal placement of ND tube).  >>> IV PPI BID   >>> Recommend ongoing GOC discussions.    I discussed the patient's findings and my recommendations with patient, nursing staff, and consulting provider    NARGIS Larsen  04/01/24  08:32 EDT

## 2024-04-01 NOTE — ANESTHESIA PREPROCEDURE EVALUATION
Anesthesia Evaluation     Patient summary reviewed and Nursing notes reviewed   NPO Solid Status: > 8 hours  NPO Liquid Status: > 2 hours           Airway   Mallampati: II  TM distance: >3 FB  Neck ROM: full  No difficulty expected  Dental      Pulmonary    (+) pneumonia resolved , a smoker Former, cigarettes, COPD (MDI prescribed) moderate,  Cardiovascular   Exercise tolerance: poor (<4 METS)    ECG reviewed    (+) hypertension    ROS comment: ECG SB 1 AVB   ECHO 2024 EF > 56 %. LVH RVSDP mildly elevated (35-45 mmHg).  Small (1-2cm) pericardial effusion- No evidence pericardial tamponade  GXT 2017 low probability CAD but significant physical deconditioning.      Neuro/Psych  (+) numbness, psychiatric history, dementia, poor historian.  GI/Hepatic/Renal/Endo    (+) GERD, renal disease (creat K normal)-, diabetes mellitus (A1C7) type 2 poorly controlled    Musculoskeletal     (+) neck pain  Abdominal    Substance History   (+) drug use (MJ)     OB/GYN          Other   blood dyscrasia anemia,     ROS/Med Hx Other: Nursinh Home pt w severe dementia  Resp insufficiency was initial presentation 3/10/24  Dysphagia HCT 27       Phys Exam Other: Mac 3 g 1 v 2022   Poor dentition lowers               Anesthesia Plan    ASA 4     MAC     (PFL)  intravenous induction     Anesthetic plan, risks, benefits, and alternatives have been provided, discussed and informed consent has been obtained with: patient.    Plan discussed with CRNA.    CODE STATUS:    Code Status (Patient has no pulse and is not breathing): CPR (Attempt to Resuscitate)  Medical Interventions (Patient has pulse or is breathing): Full Support

## 2024-04-01 NOTE — CASE MANAGEMENT/SOCIAL WORK
Continued Stay Note  [facility]     Patient Name: Omkar Nava  MRN: [id number]  Today's Date: 4/1/2024    Admit Date: 3/10/2024    Plan: SNF   Discharge Plan       Row Name 04/01/24 1526       Plan    Plan SNF    Plan Comments Discussed patient in MDR.  Patient had PEG placed today.  Referral called to Keyla at [facility].  CM will follow up tomorrow.                   Discharge Codes    No documentation.                   Daisy Kelly RN

## 2024-04-01 NOTE — NURSING NOTE
Pt remains arousable by voice, with moderately incoherent speech, able to state name and follow commands but not much beyond that. Pt had no complaints of pain for the shift and nonverbal indicators were absent. PEG tube place and tolerated well, TF resumed. Adequate UOP , FMS still in place. Pt was bradycardic all shift from 48-65 bpm, BP WDL, O2 Sats >100% on NC, Afebrile. Sister updated on plan of care and questions answered.

## 2024-04-01 NOTE — PROGRESS NOTES
Clinical Nutrition     Nutrition Support Assessment  Reason for Visit: Follow-up protocol, EN    Patient Name: Omkar Nava  YOB: 1955  MRN: [id number]  Date of Encounter: 03/31/24 20:43 EDT  Admission date: 3/10/2024    Comment: EN rec per MD Novasource Renal 47 ml/hr Water at 30 ml ev 2 hr w 2 Prosource/da over 20 hr to supply: 940 ml for 2000 Kcal 108% est need, 115 g % est need, 0 fiber 677 ml Water in  total ml Free Water.     Follow for delivery trend, I & 0, labs. PEG pending.    Nutrition Assessment   Admission Diagnosis:  Healthcare-associated pneumonia [J18.9]  Acute respiratory failure with hypoxemia [J96.01]      Problem List:    Acute type 1 respiratory failure    Essential hypertension    Dementia    Dysphagia    J15.0, ESBL Klebsiella pneumonia    Protein calorie malnutrition    Type 2 diabetes mellitus with diabetic chronic kidney disease    Acute kidney injury superimposed on chronic kidney disease        PMH:   He  has a past medical history of Anemia, Dementia, Diabetes mellitus, Dysphagia, GERD (gastroesophageal reflux disease), History of alcohol abuse, History of cocaine use, History of marijuana use, Hypertension, Osteomyelitis, Poor historian, and Visual impairment.    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      Applicable Nutrition Concerns:   Skin:  Oral: history of dysphagia - came in with chocking incident   GI: came with constipation; improved, now with diarrhea    Applicable Interval History:   (3/10) Intubated   (3/11) EN started - Novasource Renal   (3/20) NG replaced with NJ  (3/25) Changed to standard formula- Isosource 1.5   Extubated   SLP- NPO    Reported/Observed/Food/Nutrition Related History:     3/30) PEG pending.    3/28) One day documentation consistent w > 20 hr EN delivery. Watch  for trend. Note BS cont elevated however less than prior w no dextrose infusion.     3/25) Pt continues tolerating EN. Is having significant FMS output. Fiber previously added was d/c per Intensivist. Renal fx improved, discussed in MDR Intensivist to change to standard formula. BG running high insulin changes made. Also receiving D5 infusion for previous hypernatremia, Na WNL today. IC was completed and needs reassessed. At time of this note pt has been extubated. SLP has seen and continues to recommend NPO    3/22) Tolerating EN. Phos dropping past 3 days, critically low this am and to be replaced, K low as well. Creatinine WNL today. Suspect low lytes 2/2 renal formula, however hesitant to change at this time given prolonged poor renal fx in setting of renal disease. Will add prosource to better meet protein needs and may assist with lytes as well. If no improvement in next 72 hr, will consider formula change. Pt now with FMS, will add some fiber for stool formation. Documented weights continue to be higher than suspected actual, several liters positive. Ordered IC.     3/21) Tolerating EN at goal. Bowels moving. NG replaced with NJ.      3/19) Pt tolerating EN without issue. Remains intubated/sedated. Bowels moving. Renal fx remains poor, nephrology following and increased water flushes over the weekend. Still hypernatremic, will increase further.     3/15) Pt with abd. distention yesterday and EN was held. Imagining showed mild excess fluid in bowel and stomach, stool burden. No BM yet, receiving senokot, colace, and miralax. Gastroenterology placed NJ in EGD yesterday. Discussed in MDR, Intensivist okay with restarting slow/low. Pt with hypoglycemia while EN was on hold and D20 infusion added, discussed plan to stop this as EN advances to goal. Renal fx labs slightly worsened today. O2 sats improved and no current plans for proning pt.      3/14) Remains intubated, off sedation.  Overall improved renal status.   Patient was taken to CT of abdomen this morning; not clear reason, no documentation of any GI issues.    Discussed with RN caring for patient today. Reports patient was starting to have abdominal distention yesterday, EN was turned off; turned back on at some point. She noted patient with increased distention this morning which was not the case yesterday.  CT of abdomen and KUB results noted. It seem patient is still with significant stool burden.  RN giving ordered bowel regiment now - not given yesterday per MAR records?     3/11) Pt presents in ARF, intubated/sedated. Intensivist consulted for EN and has already placed order for Novasource which RD concurs with. Pt from NH and came to ED after choking incident. Pt with severe dementia and aggression. Advanced chronic diseases HF, CKD, DM, dysphagia. Pt is blind.   Pt has NG. Did have some emesis on POA, KUB showed stool burden, pt now has had several BMs. No further emesis. Pt with significantly high Potassium on POA, improved with several doses lokelma to 6.0 today. Renal fx labs poor, A/C renal failure per Nephrology. Pt was receiving propofol but this was stopped, plan to transition to precedex and fentanyl. Pt was also receiving NS @ 100 ml/hr which was d/c this am. No current vasopressor support.     Labs    Labs Reviewed: Yes     Results from last 7 days   Lab Units 03/31/24  0438 03/30/24  0528 03/29/24  1216 03/29/24  0544 03/28/24  0525 03/27/24  0419 03/25/24  1524 03/25/24  0027   GLUCOSE mg/dL 133* 142* 134* 158*   < > 162*   < > 235*   BUN mg/dL 52* 54* 50* 50*   < > 41*   < > 40*   CREATININE mg/dL 1.06 0.99 1.20 1.12   < > 0.95   < > 1.25   SODIUM mmol/L 145 142 145 143   < > 143   < > 145   CHLORIDE mmol/L 107 107 109* 108*   < > 108*   < > 112*   POTASSIUM mmol/L 4.8 4.8 5.0 5.4*   < > 4.4   < > 3.9   PHOSPHORUS mg/dL 3.8  --   --  4.1  --  2.9  --  3.4   MAGNESIUM mg/dL 2.3  --   --  2.4  --  2.2  --  1.9   ALT (SGPT) U/L 254*  --   --   --    "--   --   --  380*    < > = values in this interval not displayed.       Results from last 7 days   Lab Units 03/31/24  0438 03/25/24  0027   ALBUMIN g/dL 3.0* 2.4*   PREALBUMIN mg/dL  --  13.8*   CRP mg/dL  --  4.73*   TRIGLYCERIDES mg/dL  --  106         Results from last 7 days   Lab Units 03/31/24 2002 03/31/24  1825 03/31/24  1139 03/31/24  0622 03/30/24  2303 03/30/24  1701   GLUCOSE mg/dL 80 108 113 156* 117 126     Lab Results   Lab Value Date/Time    HGBA1C 7.00 (H) 10/16/2022 0432    HGBA1C 8.7 (H) 06/25/2022 0242    HGBA1C 13.4 04/14/2022 1058                   Medications    Medications Reviewed: Yes  Pertinent: Insulin, Prevacid Demadex, Depakene   Infusion:   PRN:     Intake/Ouptut 24 hrs (0701 - 0700)   I&O's Reviewed: Yes   Intake & Output (last day)         03/31 0701  04/01 0700    Other 235    NG/    Total Intake(mL/kg) 880 (9.7)    Urine (mL/kg/hr) 1250 (1)    Stool 0    Total Output 1250    Net -370              3/30 rectal tube output 350    Anthropometrics     Height: Height: 182.9 cm (72\")  Last Filed Weight: Weight: 90.8 kg (200 lb 2.8 oz) (03/31/24 0300)  Method: Weight Method: Bed scale  BMI: BMI (Calculated): 27.1  BMI classification: Obese Class II: 35-39.9kg/m2  IBW:      UBW: fluctuates 2/2 CKD ~180-200 lb since 2022   Weight       Weight (kg) Weight (lbs) Weight Method Visit Report   8/9/2023 88.905 kg  196 lb  Bed scale     1/15/2024 93.441 kg  206 lb  Estimated     1/16/2024 90.855 kg  200 lb 4.8 oz  Bed scale      90 kg  198 lb 6.6 oz      1/17/2024 95.21 kg  209 lb 14.4 oz      1/18/2024 85.548 kg  188 lb 9.6 oz  Bed scale     1/19/2024 82.691 kg  182 lb 4.8 oz  Bed scale     1/20/2024 83.054 kg  183 lb 1.6 oz  Bed scale     1/21/2024 85.14 kg  187 lb 11.2 oz  Bed scale     2/5/2024 85.1 kg  187 lb 9.8 oz  Estimated     2/22/2024 84.823 kg  187 lb   --    3/10/2024 84.823 kg  187 lb  Stated      98.1 kg  216 lb 4.3 oz  Bed scale     3/11/2024 90 kg  198 lb 6.6 oz  Bed scale " "      Weight change: no significant changes - suspect currently holding fluid    Nutrition Focused Physical Exam     Date: 3/11, 3/22    Unable to perform exam due to: Pt unable to participate at time of visit, Defer pending indication    Needs Assessment   Date: 3/11, reassessed 3/19, 3/22, 3/25    Height used:Height: 182.9 cm (72\")  Weights used: 85 kg / 187 lb (ABW), 53 kg / 117 lb (IBW)  *suspected dry weight likely ~185 lb given previously documented including recent MDOV weight. Likely currently holding fluid, will continue to monitor.    Estimated Calorie needs: ~1850 Kcal/day   Method:  Kcals/KG 15-20 = 0288-3170  Method:   IC = 1850 kcal, RQ=0.73      Estimated Protein needs: ~102 g PRO/day  Method: 1.2 g/Kg ABW = 102    Estimated Fluid needs: ~ ml/day   Per clinical status    Current Nutrition Prescription     PO: NPO Diet NPO Type: Strict NPO  Oral Nutrition Supplement:   Intake: N/A    EN: NovaSource Renal  Goal Rate: 47 ml/hr   Water Flushes: 30ml ev 2 hr  Modular: Prosource-no carb 2/day  Route: NJ  Tube: Small bore  Novasource Renal 47 ml/hr Water at 30 ml ev 2 hr w 2 Prosource/da over 20 hr to supply: 940 ml for 2000 Kcal 108% est need, 115 g % est need, 0 fiber 677 ml Water in  total ml Free Water.     At goal over: 20Hrs/day    Rx will supply:   Goal Volume 940 mL/day       Flush Volume 300 mL/day       Energy 2000 Kcal/day 108 % Est Need   Protein 115 g/day 106 % Est Need   Fiber 0 g/day       Water in   mL       Total Water 977 mL       Meet DRI No           --------------------------------------------------------------------------  Product/Rate verified at bedside: Yes   Infusing Rate at time of visit:  Novasurce Renal 47 ml/hr Water at 30ml ev 2 hr.     Average Delivery from Charting: 3 Days: note includes one day on Isosource 1.5 63 ml/hr Water at 30 ml ev 2 hr.  Volume  mL/day   % Goal Vol.   Flush Volume 342 mL/day     Energy 2025 Kcal/day 109 % Est Need   Protein 131 " g/day 128 % Est Need   Fiber 7.5 g/day     Water in   mL     Total Water 1137 mL     Meet DRI No          Nutrition Diagnosis   Date: 3/11  Updated: 3/14  Problem Inadequate oral intake   Etiology ARF requiring mechanical ventilation   Signs/Symptoms Intubated/sedated, NPO   Status: Extubated, Receiving EN    Date: 3/11  Updated: 3/14, 3/31  Problem Altered nutrition related laboratory values   Etiology A/C Renal Failure   Signs/Symptoms Creatinine 2.02, BUN 57, K 6.0   Status: Creatinine K+ WNL, BUN 52    Goal:   General: Nutrition to support treatment  PO: Advance diet as medically feasible/appropriate  EN/PN: Maintain EN at this time follow for delivery trend    Nutrition Intervention      Follow treatment progress, Care plan reviewed      Monitoring/Evaluation:   Per protocol, I&O, Pertinent labs, EN delivery/tolerance, Weight, GI status, Symptoms, POC/GOC, Swallow function, Hemodynamic stability      Melissa Serrano RD,   Time Spent: 30 min

## 2024-04-01 NOTE — ANESTHESIA POSTPROCEDURE EVALUATION
Patient: Omkar Nava    Procedure Summary       Date: 04/01/24 Room / Location:  [facility]    Anesthesia Start: 1420 Anesthesia Stop: 1458    Procedure: ESOPHAGOGASTRODUODENOSCOPY WITH PERCUTANEOUS ENDOSCOPIC GASTROSTOMY TUBE INSERTION Diagnosis:     Surgeons: Brunner, Mark I, MD Provider: Gage Mccloud MD    Anesthesia Type: MAC ASA Status: 4            Anesthesia Type: MAC    Vitals  No vitals data found for the desired time range.          Post Anesthesia Care and Evaluation    Patient location during evaluation: PACU  Patient participation: complete - patient participated  Level of consciousness: obtunded/minimal responses  Pain management: adequate    Airway patency: patent  Anesthetic complications: No anesthetic complications  PONV Status: none  Cardiovascular status: hemodynamically stable and acceptable  Respiratory status: nonlabored ventilation, acceptable and face mask  Hydration status: acceptable

## 2024-04-02 LAB
ALBUMIN SERPL-MCNC: 2.9 G/DL (ref 3.5–5.2)
ALBUMIN/GLOB SERPL: 0.8 G/DL
ALP SERPL-CCNC: 338 U/L (ref 39–117)
ALT SERPL W P-5'-P-CCNC: 213 U/L (ref 1–41)
ANION GAP SERPL CALCULATED.3IONS-SCNC: 9 MMOL/L (ref 5–15)
AST SERPL-CCNC: 76 U/L (ref 1–40)
BILIRUB SERPL-MCNC: 0.2 MG/DL (ref 0–1.2)
BUN SERPL-MCNC: 52 MG/DL (ref 8–23)
BUN/CREAT SERPL: 46 (ref 7–25)
CALCIUM SPEC-SCNC: 8.4 MG/DL (ref 8.6–10.5)
CHLORIDE SERPL-SCNC: 112 MMOL/L (ref 98–107)
CO2 SERPL-SCNC: 28 MMOL/L (ref 22–29)
CREAT SERPL-MCNC: 1.13 MG/DL (ref 0.76–1.27)
DEPRECATED RDW RBC AUTO: 52.7 FL (ref 37–54)
EGFRCR SERPLBLD CKD-EPI 2021: 71.7 ML/MIN/1.73
ERYTHROCYTE [DISTWIDTH] IN BLOOD BY AUTOMATED COUNT: 15.5 % (ref 12.3–15.4)
GLOBULIN UR ELPH-MCNC: 3.7 GM/DL
GLUCOSE BLDC GLUCOMTR-MCNC: 108 MG/DL (ref 70–130)
GLUCOSE BLDC GLUCOMTR-MCNC: 119 MG/DL (ref 70–130)
GLUCOSE BLDC GLUCOMTR-MCNC: 146 MG/DL (ref 70–130)
GLUCOSE BLDC GLUCOMTR-MCNC: 164 MG/DL (ref 70–130)
GLUCOSE BLDC GLUCOMTR-MCNC: 169 MG/DL (ref 70–130)
GLUCOSE BLDC GLUCOMTR-MCNC: 94 MG/DL (ref 70–130)
GLUCOSE SERPL-MCNC: 144 MG/DL (ref 65–99)
HCT VFR BLD AUTO: 28.3 % (ref 37.5–51)
HGB BLD-MCNC: 8.5 G/DL (ref 13–17.7)
MAGNESIUM SERPL-MCNC: 2.5 MG/DL (ref 1.6–2.4)
MCH RBC QN AUTO: 28.2 PG (ref 26.6–33)
MCHC RBC AUTO-ENTMCNC: 30 G/DL (ref 31.5–35.7)
MCV RBC AUTO: 94 FL (ref 79–97)
PHOSPHATE SERPL-MCNC: 3.8 MG/DL (ref 2.5–4.5)
PLATELET # BLD AUTO: 312 10*3/MM3 (ref 140–450)
PMV BLD AUTO: 12.2 FL (ref 6–12)
POTASSIUM SERPL-SCNC: 4.6 MMOL/L (ref 3.5–5.2)
PROT SERPL-MCNC: 6.6 G/DL (ref 6–8.5)
RBC # BLD AUTO: 3.01 10*6/MM3 (ref 4.14–5.8)
SODIUM SERPL-SCNC: 149 MMOL/L (ref 136–145)
WBC NRBC COR # BLD AUTO: 10.11 10*3/MM3 (ref 3.4–10.8)

## 2024-04-02 PROCEDURE — 99232 SBSQ HOSP IP/OBS MODERATE 35: CPT | Performed by: PHYSICIAN ASSISTANT

## 2024-04-02 PROCEDURE — 25010000002 HEPARIN (PORCINE) PER 1000 UNITS: Performed by: INTERNAL MEDICINE

## 2024-04-02 PROCEDURE — 80053 COMPREHEN METABOLIC PANEL: CPT | Performed by: INTERNAL MEDICINE

## 2024-04-02 PROCEDURE — 63710000001 INSULIN REGULAR HUMAN PER 5 UNITS: Performed by: INTERNAL MEDICINE

## 2024-04-02 PROCEDURE — 85027 COMPLETE CBC AUTOMATED: CPT | Performed by: INTERNAL MEDICINE

## 2024-04-02 PROCEDURE — 83735 ASSAY OF MAGNESIUM: CPT | Performed by: INTERNAL MEDICINE

## 2024-04-02 PROCEDURE — 97530 THERAPEUTIC ACTIVITIES: CPT

## 2024-04-02 PROCEDURE — 84100 ASSAY OF PHOSPHORUS: CPT | Performed by: INTERNAL MEDICINE

## 2024-04-02 PROCEDURE — 63710000001 INSULIN DETEMIR PER 5 UNITS: Performed by: INTERNAL MEDICINE

## 2024-04-02 PROCEDURE — 94799 UNLISTED PULMONARY SVC/PX: CPT

## 2024-04-02 PROCEDURE — 94664 DEMO&/EVAL PT USE INHALER: CPT

## 2024-04-02 PROCEDURE — 92526 ORAL FUNCTION THERAPY: CPT

## 2024-04-02 PROCEDURE — 99232 SBSQ HOSP IP/OBS MODERATE 35: CPT | Performed by: INTERNAL MEDICINE

## 2024-04-02 PROCEDURE — 82948 REAGENT STRIP/BLOOD GLUCOSE: CPT

## 2024-04-02 RX ADMIN — ALBUTEROL SULFATE 2.5 MG: 2.5 SOLUTION RESPIRATORY (INHALATION) at 18:36

## 2024-04-02 RX ADMIN — ALBUTEROL SULFATE 2.5 MG: 2.5 SOLUTION RESPIRATORY (INHALATION) at 12:39

## 2024-04-02 RX ADMIN — HYDRALAZINE HYDROCHLORIDE 100 MG: 50 TABLET ORAL at 05:20

## 2024-04-02 RX ADMIN — FLUOXETINE 20 MG: 20 CAPSULE ORAL at 08:06

## 2024-04-02 RX ADMIN — CARVEDILOL 12.5 MG: 12.5 TABLET, FILM COATED ORAL at 21:53

## 2024-04-02 RX ADMIN — ARIPIPRAZOLE 5 MG: 10 TABLET ORAL at 08:05

## 2024-04-02 RX ADMIN — INSULIN DETEMIR 8 UNITS: 100 INJECTION, SOLUTION SUBCUTANEOUS at 21:55

## 2024-04-02 RX ADMIN — CLONIDINE HYDROCHLORIDE 0.2 MG: 0.2 TABLET ORAL at 21:53

## 2024-04-02 RX ADMIN — HUMAN INSULIN 6 UNITS: 100 INJECTION, SOLUTION SUBCUTANEOUS at 12:06

## 2024-04-02 RX ADMIN — HEPARIN SODIUM 5000 UNITS: 5000 INJECTION INTRAVENOUS; SUBCUTANEOUS at 14:21

## 2024-04-02 RX ADMIN — Medication 30 ML: at 21:59

## 2024-04-02 RX ADMIN — FLUOXETINE 20 MG: 20 CAPSULE ORAL at 21:52

## 2024-04-02 RX ADMIN — HUMAN INSULIN 6 UNITS: 100 INJECTION, SOLUTION SUBCUTANEOUS at 18:31

## 2024-04-02 RX ADMIN — HYDRALAZINE HYDROCHLORIDE 100 MG: 50 TABLET ORAL at 21:52

## 2024-04-02 RX ADMIN — TERAZOSIN HYDROCHLORIDE ANHYDROUS 5 MG: 5 CAPSULE ORAL at 21:52

## 2024-04-02 RX ADMIN — VALPROIC ACID 150 MG: 250 SOLUTION ORAL at 05:20

## 2024-04-02 RX ADMIN — HYDRALAZINE HYDROCHLORIDE 100 MG: 50 TABLET ORAL at 14:21

## 2024-04-02 RX ADMIN — CLONIDINE HYDROCHLORIDE 0.2 MG: 0.2 TABLET ORAL at 14:21

## 2024-04-02 RX ADMIN — HEPARIN SODIUM 5000 UNITS: 5000 INJECTION INTRAVENOUS; SUBCUTANEOUS at 05:19

## 2024-04-02 RX ADMIN — AMLODIPINE BESYLATE 10 MG: 10 TABLET ORAL at 08:04

## 2024-04-02 RX ADMIN — VALPROIC ACID 150 MG: 250 SOLUTION ORAL at 21:57

## 2024-04-02 RX ADMIN — INSULIN DETEMIR 8 UNITS: 100 INJECTION, SOLUTION SUBCUTANEOUS at 08:06

## 2024-04-02 RX ADMIN — BRIMONIDINE TARTRATE 1 DROP: 2 SOLUTION/ DROPS OPHTHALMIC at 08:06

## 2024-04-02 RX ADMIN — TORSEMIDE 5 MG: 10 TABLET ORAL at 08:04

## 2024-04-02 RX ADMIN — ALBUTEROL SULFATE 2.5 MG: 2.5 SOLUTION RESPIRATORY (INHALATION) at 06:22

## 2024-04-02 RX ADMIN — CLONIDINE HYDROCHLORIDE 0.2 MG: 0.2 TABLET ORAL at 05:20

## 2024-04-02 RX ADMIN — HUMAN INSULIN 2 UNITS: 100 INJECTION, SOLUTION SUBCUTANEOUS at 12:06

## 2024-04-02 RX ADMIN — Medication 30 ML: at 08:04

## 2024-04-02 RX ADMIN — TERAZOSIN HYDROCHLORIDE ANHYDROUS 5 MG: 5 CAPSULE ORAL at 08:04

## 2024-04-02 RX ADMIN — VALPROIC ACID 150 MG: 250 SOLUTION ORAL at 14:21

## 2024-04-02 RX ADMIN — HUMAN INSULIN 6 UNITS: 100 INJECTION, SOLUTION SUBCUTANEOUS at 05:19

## 2024-04-02 RX ADMIN — LANSOPRAZOLE 30 MG: 15 TABLET, ORALLY DISINTEGRATING ORAL at 05:20

## 2024-04-02 RX ADMIN — CARVEDILOL 12.5 MG: 12.5 TABLET, FILM COATED ORAL at 08:04

## 2024-04-02 RX ADMIN — TIMOLOL MALEATE 1 DROP: 5 SOLUTION/ DROPS OPHTHALMIC at 08:06

## 2024-04-02 RX ADMIN — HEPARIN SODIUM 5000 UNITS: 5000 INJECTION INTRAVENOUS; SUBCUTANEOUS at 21:52

## 2024-04-02 NOTE — PROGRESS NOTES
INTENSIVIST   PROGRESS NOTE     Hospital:  LOS: 23 days     Mr. Omkar Nava, 68 y.o. male is followed for a Chief Complaint of: Respiratory failure, pneumonia      Subjective   S     Interval History:  No acute events. Got a PEG yesterday.        The patient's relevant past medical, surgical and social history were reviewed and updated in Epic as appropriate.      ROS: Unable to obtain secondary to dementia.     Objective   O     Vitals:  Temp  Min: 97.4 °F (36.3 °C)  Max: 97.9 °F (36.6 °C)  BP  Min: 117/76  Max: 156/62  Pulse  Min: 50  Max: 63  Resp  Min: 16  Max: 18  SpO2  Min: 91 %  Max: 99 % Flow (L/min)  Min: 1  Max: 2    Intake/Ouptut 24 hrs (7:00AM - 6:59 AM)  Intake & Output (last 3 days)         03/15 0701  03/16 0700 03/16 0701  03/17 0700 03/17 0701  03/18 0700 03/18 0701  03/19 0700    I.V. (mL/kg) 1860.5 (20.4) 836.4 (8.5) 2531.7 (25.6) 398.6 (4)    Blood        Other 181 193 321 170    NG/ 637 722 243    IV Piggyback 52.7  278.5     Total Intake(mL/kg) 2652.2 (29.1) 1666.4 (16.9) 3853.2 (39) 811.6 (8.2)    Urine (mL/kg/hr) 3765 (1.7) 1815 (0.8) 1475 (0.6) 325 (0.4)    Stool   0     Total Output 3765 1815 1475 325    Net -1112.8 -148.6 +2378.2 +486.6            Stool Unmeasured Occurrence   3 x             Medications (drips):           Physical Examination  Telemetry:  Normal sinus rhythm.    Constitutional:  No acute distress.  Sitting up in a chair.    Eyes: No scleral icterus.   PERRL, EOM intact.    Neck:  Supple, FROM   Cardiovascular: Normal rate, regular and rhythm. Normal heart sounds.  No murmurs, gallop or rub.   Respiratory: No respiratory distress. Normal respiratory effort.  Diminished. No wheezing.    Abdominal:  Soft. No masses. Nontender. No distension. No HSM.   Extremities: No digital cyanosis. No clubbing.  1+ peripheral edema.   Skin: No rashes, lesions or ulcers   Neurological:   Answers some questions.              Results from last 7 days   Lab Units 04/02/24  2342  04/01/24  0509 03/31/24  0438   WBC 10*3/mm3 10.11 7.93 8.14   HEMOGLOBIN g/dL 8.5* 8.3* 8.3*   MCV fL 94.0 91.0 91.6   PLATELETS 10*3/mm3 312 338 369     Results from last 7 days   Lab Units 04/02/24 0431 04/01/24  0509 03/31/24  0438   SODIUM mmol/L 149* 142 145   POTASSIUM mmol/L 4.6 4.9 4.8   CO2 mmol/L 28.0 29.0 30.0*   CREATININE mg/dL 1.13 1.21 1.06   GLUCOSE mg/dL 144* 141* 133*   MAGNESIUM mg/dL 2.5* 2.6* 2.3   PHOSPHORUS mg/dL 3.8 3.9 3.8     Estimated Creatinine Clearance: 83.8 mL/min (by C-G formula based on SCr of 1.13 mg/dL).  Results from last 7 days   Lab Units 04/02/24  0431 04/01/24  0509 03/31/24 0438   ALK PHOS U/L 338* 414* 335*   BILIRUBIN mg/dL 0.2 0.2 0.2   ALT (SGPT) U/L 213* 285* 254*   AST (SGOT) U/L 76* 156* 136*                 Images:  Imaging Results (Last 24 Hours)       ** No results found for the last 24 hours. **               Results: Reviewed.  I reviewed the patient's new laboratory and imaging results.  I independently reviewed the patient's new images.    Medications: Reviewed.    Assessment & Plan   A / P     Mr. Nava is a 68-year-old gentleman with diabetes mellitus complicated by blindness, osteomyelitis, chronic kidney disease, underlying dementia with psychosis, dependent for mobility, bathing and dressing, chronic anemia, heart failure with normal EF who is now admitted for his third admission in the last 2.5 months for an aspiration event with aspiration pneumonia.  On his previous admission a modified barium swallow did reveal moderate oral and mild pharyngeal dysphagia February 5, 2024.  Respiratory status deteriorated overnight on March 10 and he required intubation.  He initially was requiring 35% oxygen over March 13, 14th oxygenation worsened and he required 80% FiO2.  CTA of the chest was repeated and was negative for PE but revealed worsening bilateral pneumonia.  He developed abdominal distention.  CTA of the abdomen revealed no bowel obstruction or  ischemic bowel.       Respiratory cultures are growing ESBL Klebsiella and antibiotics were changed to Ertapenem on 3/16. He completed a 10 day course.     He was eventually extubated. PEG tube placed on 4/1/24 by GI.     Nutrition:   NPO Diet NPO Type: Strict NPO  Advance Directives:   Code Status and Medical Interventions:   Ordered at: 03/10/24 1302     Code Status (Patient has no pulse and is not breathing):    CPR (Attempt to Resuscitate)     Medical Interventions (Patient has pulse or is breathing):    Full Support       Active Hospital Problems    Diagnosis     **Acute type 1 respiratory failure     Acute kidney injury superimposed on chronic kidney disease     Dysphagia     Dementia     Protein calorie malnutrition     J15.0, ESBL Klebsiella pneumonia     Essential hypertension     Type 2 diabetes mellitus with diabetic chronic kidney disease        Assessment / Plan:    Advance tube feeds to goal via PEG.    Oxygenation stable.   Monitoring LFTs. Improved today.   D/C FMS.   PT/OT  AM labs  Transfer to telemetry. Back to nursing facility when bed is available.    Discussed with patient's sister at bedside. Explained that he will like need the PEG tube long-term and that I do not expect significant recovery in terms of his swallowing in the setting of dementia and recurrent episodes of aspiration previously.       High level of risk due to:  severe exacerbation of chronic illness and illness with threat to life or bodily function.    Plan of care and goals reviewed during interdisciplinary rounds.  I discussed the patient's findings and my recommendations with patient, family, and nursing staff      Radha Wetzel,     Intensive Care Medicine and Pulmonary Medicine

## 2024-04-02 NOTE — PROGRESS NOTES
Pt is POD #1 from EGD with PEG insertion. Sister at bedside. PEG insertion site is CDI. No redness, streaking or discharge appreciated. PEG is easily mobile and taught to skin at 5cm. Pt tolerating tube feeds with no immediate complications. GI will sign off. Tube care instructions provided to patient, family and nursing staff.

## 2024-04-02 NOTE — THERAPY TREATMENT NOTE
Patient Name: Omkar Nava  : 1955    MRN: [id number]                              Today's Date: 2024       Admit Date: 3/10/2024    Visit Dx:     ICD-10-CM ICD-9-CM   1. Acute respiratory failure with hypoxia  J96.01 518.81   2. History of dementia  Z86.59 V11.8   3. Pneumonia due to infectious organism, unspecified laterality, unspecified part of lung  J18.9 486   4. Hypothermia, initial encounter  T68.XXXA 991.6   5. Anemia, unspecified type  D64.9 285.9   6. Hyperkalemia  E87.5 276.7   7. Encephalopathy  G93.40 348.30   8. Protein-calorie malnutrition, unspecified severity  E46 263.9   9. Iron deficiency anemia, unspecified iron deficiency anemia type  D50.9 280.9   10. Dysphagia, unspecified type  R13.10 787.20   11. 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, without long-term current use of insulin    Orthostatic hypotension    Acute osteomyelitis of left foot    Type 2 diabetes mellitus    Essential hypertension    Psychotic disorder    Neurocognitive disorder    S/P transmetatarsal amputation of foot, left    Hypoxia    Abscess of left foot    Anemia of chronic disease    Aspiration pneumonia    Cellulitis of left toe    Cellulitis of lower extremity    Cervical spine pain    Chronic kidney disease    Closed head injury without loss of consciousness    Dementia    Dental cavities    Diarrhea of presumed infectious origin    Displaced fracture of proximal phalanx of left great toe, initial encounter for open fracture    Dysphagia    Erectile dysfunction    Fall    Gas gangrene    Generalized muscle weakness    Hallucinations    Hypertensive heart and chronic kidney disease 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 failure, with stage 1 through stage 4 chronic kidney disease, or unspecified chronic kidney disease    Insomnia due to medical  condition    Iron deficiency anemia    J15.0, ESBL Klebsiella pneumonia    Laceration without foreign body, left foot, subsequent encounter    Long term (current) use of insulin    Other acute osteomyelitis, left ankle and foot    Personal history of Methicillin resistant Staphylococcus aureus infection    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    Protein calorie malnutrition    Simple chronic bronchitis    Tobacco use    Type 2 diabetes mellitus with diabetic neuropathy, unspecified    Wound infection, posttraumatic    Type 2 diabetes mellitus with diabetic chronic kidney disease    Encephalopathy    Bilateral pneumonia    Acute kidney injury superimposed on chronic kidney disease    Acute type 1 respiratory failure     Past Medical History:   Diagnosis Date    Anemia     Dementia     Diabetes mellitus     Dysphagia     GERD (gastroesophageal reflux disease)     History of alcohol abuse     History of cocaine use     History of marijuana use     Hypertension     Osteomyelitis     Poor historian     records obtained from nursing home records & his family    Visual impairment      Past Surgical History:   Procedure Laterality Date    AMPUTATION FOOT Left 10/18/2022    Procedure: PARTIAL FIRST RAY AMPUTATION LEFT;  Surgeon: Yeison Petty MD;  Location:  [facility];  Service: Orthopedics;  Laterality: Left;    AMPUTATION FOOT Left 12/5/2022    Procedure: Transmetatarsal of Left Foot;  Surgeon: Yeison Petty MD;  Location:  [facility];  Service: Orthopedics;  Laterality: Left;    ENDOSCOPY N/A 3/14/2024    Procedure: ESOPHAGOGASTRODUODENOSCOPY WITH JEJUNAL TUBE INSERTION AT BEDSIDE;  Surgeon: Brunner, Mark I, MD;  Location:  [facility];  Service: Gastroenterology;  Laterality: N/A;    ENDOSCOPY W/ PEG TUBE PLACEMENT N/A 4/1/2024    Procedure: ESOPHAGOGASTRODUODENOSCOPY WITH PERCUTANEOUS ENDOSCOPIC GASTROSTOMY TUBE INSERTION;  Surgeon: Brunner, Mark I, MD;  Location:  [facility];  Service: Gastroenterology;  Laterality: N/A;   EGD with PEG placement.  Secured at 3.5 cm    EYE SURGERY        General Information       Row Name 04/02/24 1126          Physical Therapy Time and Intention    Document Type therapy note (daily note)  -KG     Mode of Treatment physical therapy  -KG       Row Name 04/02/24 1126          General Information    Existing Precautions/Restrictions fall;oxygen therapy device and L/min;other (see comments)  PEG; remote L transmetatarsal amp  -KG     Barriers to Rehab medically complex;cognitive status;visual deficit  -KG       Row Name 04/02/24 1126          Cognition    Orientation Status (Cognition) oriented to;person  -KG       Row Name 04/02/24 1126          Safety Issues, Functional Mobility    Safety Issues Affecting Function (Mobility) ability to follow commands;awareness of need for assistance;insight into deficits/self-awareness;safety precaution awareness;safety precautions follow-through/compliance;sequencing abilities  -KG     Impairments Affecting Function (Mobility) balance;cognition;coordination;endurance/activity tolerance;motor control;motor planning;postural/trunk control;shortness of breath;strength  -KG     Cognitive Impairments, Mobility Safety/Performance attention;awareness, need for assistance;insight into deficits/self-awareness;safety precaution awareness;safety precaution follow-through;sequencing abilities  -KG               User Key  (r) = Recorded By, (t) = Taken By, (c) = Cosigned By      Initials Name Provider Type    KG Micaela Kaplan, PT Physical Therapist                   Mobility       Row Name 04/02/24 1127          Bed Mobility    Bed Mobility rolling left;rolling right  -KG     Rolling Left [place] (Bed Mobility) maximum assist (25% patient effort);2 person assist;verbal cues  -KG     Rolling Right [place] (Bed Mobility) maximum assist (25% patient effort);2 person assist;verbal cues  -KG     Assistive Device (Bed Mobility) bed rails;draw sheet  -KG     Comment,  (Bed Mobility) Pt rolled L and R for placement of sling.  -KG       Row Name 04/02/24 1127          Transfers    Comment, (Transfers) Pt transferred from bed to chair with mechanical lift. Initially required modA for static sitting balance, but progressed to periods of CGA with B UE support. Pt performed STS x2 from chair with blocking of alverto knees and B UE support. Pt required cues for upright posture with anterior shift at hips.  -KG       Row Name 04/02/24 1127          Bed-Chair Transfer    Bed-Chair [place] (Transfers) dependent (less than 25% patient effort);2 person assist;verbal cues  -KG     Assistive Device (Bed-Chair Transfers) lift device  -KG       Row Name 04/02/24 1127          Sit-Stand Transfer    Sit-Stand [place] (Transfers) maximum assist (25% patient effort);2 person assist;verbal cues  -KG     Assistive Device (Sit-Stand Transfers) other (see comments)  B UE support  -KG       Row Name 04/02/24 1127          Gait/Stairs (Locomotion)    [place] Level (Gait) unable to assess  -KG     Comment, (Gait/Stairs) Ambulation deferred. Not appropriate to assess.  -KG               User Key  (r) = Recorded By, (t) = Taken By, (c) = Cosigned By      Initials Name Provider Type    Micaela Perez PT Physical Therapist                   Obj/Interventions       Row Name 04/02/24 1128          Balance    Balance Assessment sitting static balance;standing static balance  -KG     Static Sitting Balance contact guard  -KG     Position, Sitting Balance supported;sitting in chair  -KG     Static Standing Balance moderate assist;2-person assist  -KG     Position/Device Used, Standing Balance supported  -KG               User Key  (r) = Recorded By, (t) = Taken By, (c) = Cosigned By      Initials Name Provider Type    KG Micaela Kaplan, PT Physical Therapist                   Goals/Plan    No documentation.                  Clinical Impression       Row Name 04/02/24 1128          Pain     Pretreatment Pain Rating 0/10 - no pain  -KG     Posttreatment Pain Rating 0/10 - no pain  -KG       Row Name 04/02/24 1128          Plan of Care Review    Plan of Care Reviewed With patient  -KG     Progress improving  -KG     Outcome Evaluation Pt transferred from bed to chair with mechanical lift. Performed STS x2 from chair with maxA x2 and B UE support. Pt required cues for upright posture with anterior shift at hips. Progressed to periods of CGA for static sitting balance in chair. Pt continues to be limited by confusion and significant generalized weakness. Continue to recommend PT skilled care and D/C to SNF.  -KG       Row Name 04/02/24 1128          Vital Signs    Pre Systolic BP Rehab 135  -KG     Pre Treatment Diastolic BP 54  -KG     Post Systolic BP Rehab 138  -KG     Post Treatment Diastolic BP 64  -KG     Pretreatment Heart Rate (beats/min) 60  -KG     Posttreatment Heart Rate (beats/min) 60  -KG     Pre SpO2 (%) 96  -KG     O2 Delivery Pre Treatment supplemental O2  -KG     Post SpO2 (%) 96  -KG     O2 Delivery Post Treatment supplemental O2  -KG     Pre Patient Position Supine  -KG     Intra Patient Position Standing  -KG     Post Patient Position Sitting  -KG       Row Name 04/02/24 1128          Positioning and Restraints    Pre-Treatment Position in bed  -KG     Post Treatment Position chair  -KG     In Chair notified nsg;reclined;call light within reach;encouraged to call for assist;exit alarm on;with family/caregiver;RUE elevated;LUE elevated;waffle cushion;on mechanical lift sling;legs elevated  -KG               User Key  (r) = Recorded By, (t) = Taken By, (c) = Cosigned By      Initials Name Provider Type    KG Micaela Kaplan, PT Physical Therapist                   Outcome Measures       Row Name 04/02/24 1130 04/02/24 0800       How much help from another person do you currently need...    Turning from your back to your side while in flat bed without using bedrails? 2  -KG 2   -QH    Moving from lying on back to sitting on the side of a flat bed without bedrails? 1  -KG 1  -QH    Moving to and from a bed to a chair (including a wheelchair)? 1  -KG 1  -QH    Standing up from a chair using your arms (e.g., wheelchair, bedside chair)? 2  -KG 1  -QH    Climbing 3-5 steps with a railing? 1  -KG 1  -QH    To walk in hospital room? 1  -KG 1  -QH    AM-PAC 6 Clicks Score (PT) 8  -KG 7  -QH    Highest Level of Mobility Goal 3 --> Sit at edge of bed  -KG 2 --> Bed activities/dependent transfer  -QH      Row Name 04/02/24 1130          Functional Assessment    Outcome Measure Options AM-PAC 6 Clicks Basic Mobility (PT)  -KG               User Key  (r) = Recorded By, (t) = Taken By, (c) = Cosigned By      Initials Name Provider Type    KG Micaela Kaplan, PT Physical Therapist     Janet Shetty, RN Registered Nurse                                 Physical Therapy Education       Title: PT OT SLP Therapies (In Progress)       Topic: Physical Therapy (In Progress)       Point: Mobility training (In Progress)       Learning Progress Summary             Patient Acceptance, E, NR by KG at 4/2/2024 1014    Acceptance, E,TB, NR by AY at 3/29/2024 1211    Acceptance, E, NR by ND at 3/27/2024 1549    Acceptance, E, NR by ND at 3/26/2024 1517    Acceptance, E,TB, NR by ES at 3/25/2024 1043   Family Acceptance, E, NR by RL at 3/16/2024 0108                         Point: Home exercise program (In Progress)       Learning Progress Summary             Patient Acceptance, E, NR by KG at 4/2/2024 1014    Acceptance, E,TB, NR by AY at 3/29/2024 1211    Acceptance, E, NR by ND at 3/27/2024 1549    Acceptance, E, NR by ND at 3/26/2024 1517   Family Acceptance, E, NR by RL at 3/16/2024 0108                         Point: Body mechanics (In Progress)       Learning Progress Summary             Patient Acceptance, E, NR by KG at 4/2/2024 1014    Acceptance, E,TB, NR by AY at 3/29/2024 1211    Acceptance, E, NR  by ND at 3/27/2024 1549    Acceptance, E, NR by ND at 3/26/2024 1517    Acceptance, E,TB, NR by ES at 3/25/2024 1043   Family Acceptance, E, NR by RL at 3/16/2024 0108                         Point: Precautions (In Progress)       Learning Progress Summary             Patient Acceptance, E, NR by KG at 4/2/2024 1014    Acceptance, E,TB, NR by AY at 3/29/2024 1211    Acceptance, E, NR by ND at 3/27/2024 1549    Acceptance, E, NR by ND at 3/26/2024 1517    Acceptance, E,TB, NR by ES at 3/25/2024 1043   Family Acceptance, E, NR by RL at 3/16/2024 0108                                         User Key       Initials Effective Dates Name Provider Type Discipline    RL 06/16/21 -  Beth Land RN Registered Nurse Nurse    KG 05/22/20 -  Micaela Kaplan, PT Physical Therapist PT    AY 11/10/20 -  Delia Lee, PT Physical Therapist PT    ES 08/11/22 -  Emmy Castellanos, PT Physical Therapist PT    ND 11/16/23 -  Libra Lugo, PT Physical Therapist PT                  PT Recommendation and Plan     Plan of Care Reviewed With: patient  Progress: improving  Outcome Evaluation: Pt transferred from bed to chair with mechanical lift. Performed STS x2 from chair with maxA x2 and B UE support. Pt required cues for upright posture with anterior shift at hips. Progressed to periods of CGA for static sitting balance in chair. Pt continues to be limited by confusion and significant generalized weakness. Continue to recommend PT skilled care and D/C to SNF.     Time Calculation:         PT Charges       Row Name 04/02/24 1014             Time Calculation    Start Time 1014  -KG      PT Received On 04/02/24  -KG      PT Goal Re-Cert Due Date 04/04/24  -KG         Time Calculation- PT    Total Timed Code Minutes- PT 18 minute(s)  -KG         Timed Charges    47510 - PT Therapeutic Activity Minutes 18  -KG         Total Minutes    Timed Charges Total Minutes 18  -KG       Total Minutes 18  -KG                User Key  (r)  = Recorded By, (t) = Taken By, (c) = Cosigned By      Initials Name Provider Type    KG Micaela Kaplan, PT Physical Therapist                  Therapy Charges for Today       Code Description Service Date Service Provider Modifiers Qty    [id number]  PT THERAPEUTIC ACT EA 15 MIN 4/2/2024 Micaela Kaplan, PT GP 1            PT G-Codes  Outcome Measure Options: AM-PAC 6 Clicks Basic Mobility (PT)  AM-PAC 6 Clicks Score (PT): 8  AM-PAC 6 Clicks Score (OT): 6       Lisette Kaplan PT  4/2/2024

## 2024-04-02 NOTE — CASE MANAGEMENT/SOCIAL WORK
Continued Stay Note  [facility]     Patient Name: Omkar Nava  MRN: [id number]  Today's Date: 4/2/2024    Admit Date: 3/10/2024    Plan: [facility]   Discharge Plan       Row Name 04/02/24 1342       Plan    Plan [facility]    Plan Comments Discussed patient in MDR.  Patient had PEG placed yesterday and is tolerating tube feeds.  Spoke with Keyla at [facility] and she states patient can return tomorrow.  [facility] stretcher requested for Wed April 3.  Facility uses [facility] [place] [facility] in [place].  CM will continue to follow.    Spoke with patient's daughter, Idalia, and she would like to speak to the physician about keeping her dad here another day or two.  Notified APRN of request.    14:20 EDT    [facility] scheduled for Wed April 3 at 1000.  CM will cancel, if needed, after MD speaks to patient's daughter.         Final Discharge Disposition Code 04 - intermediate care facility                   Discharge Codes    No documentation.                 Expected Discharge Date and Time       Expected Discharge Date Expected Discharge Time    Apr 3, 2024               Daisy Kelly RN

## 2024-04-02 NOTE — PLAN OF CARE
Goal Outcome Evaluation:  Plan of Care Reviewed With: patient        Progress: improving  Outcome Evaluation: Pt transferred from bed to chair with mechanical lift. Performed STS x2 from chair with maxA x2 and B UE support. Pt required cues for upright posture with anterior shift at hips. Progressed to periods of CGA for static sitting balance in chair. Pt continues to be limited by confusion and significant generalized weakness. Continue to recommend PT skilled care and D/C to SNF.

## 2024-04-02 NOTE — PLAN OF CARE
Goal Outcome Evaluation:         Patient transferred from ICU, on 2L NC O2> 90%, tube feeding started. No sign of discomfort or pain. Call light within reach.

## 2024-04-02 NOTE — PLAN OF CARE
Goal Outcome Evaluation:  Plan of Care Reviewed With: patient           Outcome Evaluation: Patient is oriented to self. Follow commands, moves all extremities, coorperative. VSS, SB/SR , afebrile. 2L NC. Weak congested cough. Tolerating tube feed, no residual, no hypoglycemia this shift. Liquid stool is FMS with scant output. Adequate UOP. Repositions every 2 hrs.

## 2024-04-02 NOTE — PLAN OF CARE
Goal Outcome Evaluation:  Plan of Care Reviewed With: patient, family        Progress: no change         Anticipated Discharge Disposition (SLP): extended care facility          SLP Swallowing Diagnosis: suspected pharyngeal dysphagia, other (see comments) (04/02/24 1015)  Treatment Assessment (SLP): continued (04/02/24 1015)  Treatment Assessment Comments (SLP): trialed with ice and water during exercises, cough and wet vocal quality throughout. Improved s/s with 3 second prep (04/02/24 1015)  Plan for Continued Treatment (SLP): continue treatment per plan of care (04/02/24 1015)

## 2024-04-02 NOTE — PROGRESS NOTES
Multidisciplinary Rounds EN Review Note    Patient Name: Omkar Nava  Date of Encounter: 24 16:21 EDT  MRN: [id number]  Admission date: 3/10/2024    Reason for visit: EN review . RD to continue to follow per protocol.     EMR reviewed   Medication reviewed   Labs reviewed Na 149, BUN 52, PAB 23.4, CRP 2.69    Estimated/Assessed Needs (3/25):      Energy: 1850 kcal   Protein: 102 g   Fluids: per clinical status    Additional Information Obtained:   Tolerating EN back at goal after PEG placed yesterday. Hypernatremic today, will give more water flushes. Creatinine remains stable. PAB normalized from low on admission with slightly elevated CRP.     Current diet: NPO Diet NPO Type: Strict NPO    EN: NovaSource Renal  Goal Rate: 47 ml/hr    Water Flushes: 30 ml q 2 hr  Modular: Prosource-no carb 2/day  Route: PEG  Tube: 20 PEG    At goal over: 20Hrs/day    Rx will supply:   Goal Volume 800 mL/day     Flush Volume 300 mL/day     Energy 2000 Kcal/day 108 % Est Need   Protein 115 g/day 106 % Est Need   Fiber 0 g/day     Water in   mL     Total Water 977 mL     Meet DRI No        --------------------------------------------------------------------------  Product/Rate verified at bedside: Yes   Infusing Rate at time of visit: 40 ml/hr    Average Delivery from [place] Day: 579 ml EN, 210 ml water flushes while advancing back to goal    Intervention:  Follow treatment plan  Care plan reviewed    Continue current EN regimen per order / as tolerated.  Increase water flushes to 30 ml/hr to provide 600 ml flushes / 1277 ml total water.    Follow up:   Per protocol      Chuyita Moreno RD, [facility]  16:26 EDT  Time: 25min

## 2024-04-02 NOTE — THERAPY TREATMENT NOTE
Patient Name: Omkar Nava  : 1955    MRN: [id number]                              Today's Date: 2024       Admit Date: 3/10/2024    Visit Dx:     ICD-10-CM ICD-9-CM   1. Acute respiratory failure with hypoxia  J96.01 518.81   2. History of dementia  Z86.59 V11.8   3. Pneumonia due to infectious organism, unspecified laterality, unspecified part of lung  J18.9 486   4. Hypothermia, initial encounter  T68.XXXA 991.6   5. Anemia, unspecified type  D64.9 285.9   6. Hyperkalemia  E87.5 276.7   7. Encephalopathy  G93.40 348.30   8. Protein-calorie malnutrition, unspecified severity  E46 263.9   9. Iron deficiency anemia, unspecified iron deficiency anemia type  D50.9 280.9   10. Dysphagia, unspecified type  R13.10 787.20   11. 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, without long-term current use of insulin    Orthostatic hypotension    Acute osteomyelitis of left foot    Type 2 diabetes mellitus    Essential hypertension    Psychotic disorder    Neurocognitive disorder    S/P transmetatarsal amputation of foot, left    Hypoxia    Abscess of left foot    Anemia of chronic disease    Aspiration pneumonia    Cellulitis of left toe    Cellulitis of lower extremity    Cervical spine pain    Chronic kidney disease    Closed head injury without loss of consciousness    Dementia    Dental cavities    Diarrhea of presumed infectious origin    Displaced fracture of proximal phalanx of left great toe, initial encounter for open fracture    Dysphagia    Erectile dysfunction    Fall    Gas gangrene    Generalized muscle weakness    Hallucinations    Hypertensive heart and chronic kidney disease 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 failure, with stage 1 through stage 4 chronic kidney disease, or unspecified chronic kidney disease    Insomnia due to medical  condition    Iron deficiency anemia    J15.0, ESBL Klebsiella pneumonia    Laceration without foreign body, left foot, subsequent encounter    Long term (current) use of insulin    Other acute osteomyelitis, left ankle and foot    Personal history of Methicillin resistant Staphylococcus aureus infection    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    Protein calorie malnutrition    Simple chronic bronchitis    Tobacco use    Type 2 diabetes mellitus with diabetic neuropathy, unspecified    Wound infection, posttraumatic    Type 2 diabetes mellitus with diabetic chronic kidney disease    Encephalopathy    Bilateral pneumonia    Acute kidney injury superimposed on chronic kidney disease    Acute type 1 respiratory failure     Past Medical History:   Diagnosis Date    Anemia     Dementia     Diabetes mellitus     Dysphagia     GERD (gastroesophageal reflux disease)     History of alcohol abuse     History of cocaine use     History of marijuana use     Hypertension     Osteomyelitis     Poor historian     records obtained from nursing home records & his family    Visual impairment      Past Surgical History:   Procedure Laterality Date    AMPUTATION FOOT Left 10/18/2022    Procedure: PARTIAL FIRST RAY AMPUTATION LEFT;  Surgeon: Yeison Petty MD;  Location:  [facility];  Service: Orthopedics;  Laterality: Left;    AMPUTATION FOOT Left 12/5/2022    Procedure: Transmetatarsal of Left Foot;  Surgeon: Yeison Petty MD;  Location:  [facility];  Service: Orthopedics;  Laterality: Left;    ENDOSCOPY N/A 3/14/2024    Procedure: ESOPHAGOGASTRODUODENOSCOPY WITH JEJUNAL TUBE INSERTION AT BEDSIDE;  Surgeon: Brunner, Mark I, MD;  Location:  [facility];  Service: Gastroenterology;  Laterality: N/A;    ENDOSCOPY W/ PEG TUBE PLACEMENT N/A 4/1/2024    Procedure: ESOPHAGOGASTRODUODENOSCOPY WITH PERCUTANEOUS ENDOSCOPIC GASTROSTOMY TUBE INSERTION;  Surgeon: Brunner, Mark I, MD;  Location:  [facility];  Service: Gastroenterology;  Laterality: N/A;   EGD with PEG placement.  Secured at 3.5 cm    EYE SURGERY        General Information       Row Name 04/02/24 1302          OT Time and Intention    Document Type therapy note (daily note)  -KF     Mode of Treatment occupational therapy;co-treatment  -KF       Row Name 04/02/24 1302          General Information    Patient Profile Reviewed yes  -KF     Existing Precautions/Restrictions fall;oxygen therapy device and L/min;other (see comments)  PEG; remote L transmetatarsal amp  -KF     Barriers to Rehab medically complex;previous functional deficit;cognitive status;visual deficit  -KF       Row Name 04/02/24 1302          Cognition    Orientation Status (Cognition) oriented to;person  -KF       Row Name 04/02/24 1302          Safety Issues, Functional Mobility    Safety Issues Affecting Function (Mobility) awareness of need for assistance;insight into deficits/self-awareness;safety precaution awareness;safety precautions follow-through/compliance;sequencing abilities  -KF     Impairments Affecting Function (Mobility) balance;cognition;coordination;endurance/activity tolerance;motor control;motor planning;postural/trunk control;shortness of breath;strength  -KF     Cognitive Impairments, Mobility Safety/Performance attention;awareness, need for assistance;insight into deficits/self-awareness;judgment;problem-solving/reasoning;safety precaution awareness;safety precaution follow-through;sequencing abilities  -KF               User Key  (r) = Recorded By, (t) = Taken By, (c) = Cosigned By      Initials Name Provider Type    KF Natalie Zambrano OT Occupational Therapist                     Mobility/ADL's       Row Name 04/02/24 130          Bed Mobility    Bed Mobility rolling left;rolling right  -KF     Rolling Left [place] (Bed Mobility) maximum assist (25% patient effort);2 person assist;verbal cues  -KF     Rolling Right [place] (Bed Mobility) maximum assist (25% patient effort);2 person assist;verbal cues   -KF     Assistive Device (Bed Mobility) draw sheet  -     Comment, (Bed Mobility) Pt rolled left/right for lift sling placement, verbal cues provided for sequence and hand placement.  -       Row Name 04/02/24 1303          Transfers    Transfers bed-chair transfer;sit-stand transfer;stand-sit transfer  -     Comment, (Transfers) Pt dependently transferred to chair via mechanical lift. Pt then performed STS x2 from the chair with maxA x2. Pt with posterior lean present in sitting and standing despite cueing attempts.  -       Row Name 04/02/24 1303          Bed-Chair Transfer    Bed-Chair [place] (Transfers) dependent (less than 25% patient effort);2 person assist;verbal cues  -     Assistive Device (Bed-Chair Transfers) lift device  -       Row Name 04/02/24 1303          Sit-Stand Transfer    Sit-Stand [place] (Transfers) maximum assist (25% patient effort);2 person assist;verbal cues  -     Assistive Device (Sit-Stand Transfers) other (see comments)  BUE support  -       Row Name 04/02/24 1303          Stand-Sit Transfer    Stand-Sit [place] (Transfers) maximum assist (25% patient effort);2 person assist;verbal cues  -     Assistive Device (Stand-Sit Transfers) other (see comments)  BUE support  -       Row Name 04/02/24 1303          Activities of Daily Living    BADL Assessment/Intervention lower body dressing  -       Row Name 04/02/24 1303          Lower Body Dressing Assessment/Training    [place] Level (Lower Body Dressing) don;socks;dependent (less than 25% patient effort)  -     Position (Lower Body Dressing) supine  -               User Key  (r) = Recorded By, (t) = Taken By, (c) = Cosigned By      Initials Name Provider Type    Natalie Menjivar OT Occupational Therapist                   Obj/Interventions       Row Name 04/02/24 1308          Balance    Balance Assessment sitting static balance;sitting dynamic balance;sit to stand dynamic  balance;standing static balance  -KF     Static Sitting Balance standby assist  -KF     Dynamic Sitting Balance contact guard  -KF     Position, Sitting Balance supported;sitting in chair  -KF     Sit to Stand Dynamic Balance maximum assist;2-person assist  -KF     Static Standing Balance moderate assist;2-person assist  -KF     Position/Device Used, Standing Balance supported  -KF     Balance Interventions sitting;standing;sit to stand;supported;static;dynamic;occupation based/functional task  -KF               User Key  (r) = Recorded By, (t) = Taken By, (c) = Cosigned By      Initials Name Provider Type    KF Natalie Zambrano, SHIRAZ Occupational Therapist                   Goals/Plan    No documentation.                  Clinical Impression       Row Name 04/02/24 1305          Pain Assessment    Pretreatment Pain Rating 0/10 - no pain  -KF     Posttreatment Pain Rating 0/10 - no pain  -KF     Pain Intervention(s) Repositioned;Ambulation/increased activity  -KF       Row Name 04/02/24 1308          Plan of Care Review    Plan of Care Reviewed With patient  -KF     Progress improving  -KF     Outcome Evaluation Pt with good participation and progress toward goals during OT session. Pt was dependently transferred to chair via mechanical lift. The pt performed STS x2 with maxA x2, BUE support. Pt presents with a posterior lean throughout mobility. Pt remains below his functional baseline with generalized weakness, decreased activity tolerance, and balance deficits warranting continued IP OT services. Continue to recommend a d/c to SNF for best outcome.  -       Row Name 04/02/24 4017          Therapy Assessment/Plan (OT)    Rehab Potential (OT) good, to achieve stated therapy goals  -KF     Criteria for Skilled Therapeutic Interventions Met (OT) yes;skilled treatment is necessary  -KF     Therapy Frequency (OT) daily  -KF       Row Name 04/02/24 6295          Therapy Plan Review/Discharge Plan (OT)    Anticipated  Discharge Disposition (OT) skilled nursing facility  -KF       Row Name 04/02/24 1309          Vital Signs    Pre Systolic BP Rehab 135  -KF     Pre Treatment Diastolic BP 54  -KF     Post Systolic BP Rehab 138  -KF     Post Treatment Diastolic BP 64  -KF     Pretreatment Heart Rate (beats/min) 59  -KF     Posttreatment Heart Rate (beats/min) 60  -KF     Pre SpO2 (%) 96  2L  -KF     O2 Delivery Pre Treatment nasal cannula  -KF     Post SpO2 (%) 97  -KF     O2 Delivery Post Treatment nasal cannula  -KF     Pre Patient Position Supine  -KF     Intra Patient Position Standing  -KF     Post Patient Position Sitting  -KF       Row Name 04/02/24 1309          Positioning and Restraints    Pre-Treatment Position in bed  -KF     Post Treatment Position chair  -KF     In Chair notified nsg;reclined;call light within reach;encouraged to call for assist;exit alarm on;with family/caregiver;waffle cushion;on mechanical lift sling;legs elevated  -KF               User Key  (r) = Recorded By, (t) = Taken By, (c) = Cosigned By      Initials Name Provider Type    KF Natalie Zambrano, SHIR[place] Occupational Therapist                   Outcome Measures       Row Name 04/02/24 1313          How much help from another is currently needed...    Putting on and taking off regular lower body clothing? 1  -KF     Bathing (including washing, rinsing, and drying) 2  -KF     Toileting (which includes using toilet bed pan or urinal) 1  -KF     Putting on and taking off regular upper body clothing 2  -KF     Taking care of personal grooming (such as brushing teeth) 2  -KF     Eating meals 1  -KF     AM-PAC 6 Clicks Score (OT) 9  -KF       Row Name 04/02/24 1130 04/02/24 0800       How much help from another person do you currently need...    Turning from your back to your side while in flat bed without using bedrails? 2  -KG 2  -QH    Moving from lying on back to sitting on the side of a flat bed without bedrails? 1  -KG 1  -QH    Moving to and from a  bed to a chair (including a wheelchair)? 1  -KG 1  -QH    Standing up from a chair using your arms (e.g., wheelchair, bedside chair)? 2  -KG 1  -QH    Climbing 3-5 steps with a railing? 1  -KG 1  -QH    To walk in hospital room? 1  -KG 1  -QH    AM-PAC 6 Clicks Score (PT) 8  -KG 7  -QH    Highest Level of Mobility Goal 3 --> Sit at edge of bed  -KG 2 --> Bed activities/dependent transfer  -QH      Row Name 04/02/24 1313 04/02/24 1130       Functional Assessment    Outcome Measure Options AM-PAC 6 Clicks Daily Activity (OT)  -KF AM-PAC 6 Clicks Basic Mobility (PT)  -KG              User Key  (r) = Recorded By, (t) = Taken By, (c) = Cosigned By      Initials Name Provider Type    KG Micaela Kaplan, PT Physical Therapist     Janet Shetty, RN Registered Nurse    KF Natalie Zambrano, OT Occupational Therapist                    Occupational Therapy Education       Title: PT OT SLP Therapies (In Progress)       Topic: Occupational Therapy (In Progress)       Point: ADL training (In Progress)       Description:   Instruct learner(s) on proper safety adaptation and remediation techniques during self care or transfers.   Instruct in proper use of assistive devices.                  Learning Progress Summary             Patient Acceptance, E,TB, NR by KF at 4/2/2024 0959    Acceptance, E,TB, NR by KF at 3/27/2024 1457    Acceptance, E, NR by CS at 3/25/2024 1139   Family Acceptance, E, NR by RL at 3/16/2024 0108                         Point: Home exercise program (In Progress)       Description:   Instruct learner(s) on appropriate technique for monitoring, assisting and/or progressing therapeutic exercises/activities.                  Learning Progress Summary             Patient Acceptance, E,TB, NR by KF at 3/29/2024 0959    Acceptance, E,TB, NR by KF at 3/27/2024 1457   Family Acceptance, E, NR by RL at 3/16/2024 0108                         Point: Precautions (In Progress)       Description:   Instruct  learner(s) on prescribed precautions during self-care and functional transfers.                  Learning Progress Summary             Patient Acceptance, E,TB, NR by KF at 4/2/2024 0959    Acceptance, E,TB, NR by KF at 3/29/2024 0959    Acceptance, E,TB, NR by KF at 3/27/2024 1457    Acceptance, E, NR by CS at 3/25/2024 1139   Family Acceptance, E, NR by RL at 3/16/2024 0108                         Point: Body mechanics (In Progress)       Description:   Instruct learner(s) on proper positioning and spine alignment during self-care, functional mobility activities and/or exercises.                  Learning Progress Summary             Patient Acceptance, E,TB, NR by KF at 4/2/2024 0959    Acceptance, E,TB, NR by KF at 3/29/2024 0959    Acceptance, E,TB, NR by KF at 3/27/2024 1457    Acceptance, E, NR by CS at 3/25/2024 1139   Family Acceptance, E, NR by RL at 3/16/2024 0108                                         User Key       Initials Effective Dates Name Provider Type Discipline     06/16/21 -  Beth Land RN Registered Nurse Nurse     09/02/21 -  Sarah Jade OT Occupational Therapist OT     08/09/23 -  Natalie Zambrano OT Occupational Therapist OT                  OT Recommendation and Plan  Therapy Frequency (OT): daily  Plan of Care Review  Plan of Care Reviewed With: patient  Progress: improving  Outcome Evaluation: Pt with good participation and progress toward goals during OT session. Pt was dependently transferred to chair via mechanical lift. The pt performed STS x2 with maxA x2, BUE support. Pt presents with a posterior lean throughout mobility. Pt remains below his functional baseline with generalized weakness, decreased activity tolerance, and balance deficits warranting continued IP OT services. Continue to recommend a d/c to SNF for best outcome.     Time Calculation:         Time Calculation- OT       Row Name 04/02/24 1313             Time Calculation- OT    OT Start Time 0959  -       OT Received On 04/02/24  -KF      OT Goal Re-Cert Due Date 04/04/24  -KF         Timed Charges    93339 - OT Therapeutic Activity Minutes 10  -KF      71950 - OT Self Care/Mgmt Minutes 5  -KF         Total Minutes    Timed Charges Total Minutes 15  -KF       Total Minutes 15  -KF                User Key  (r) = Recorded By, (t) = Taken By, (c) = Cosigned By      Initials Name Provider Type    KF Natalie Zambrano OT Occupational Therapist                  Therapy Charges for Today       Code Description Service Date Service Provider Modifiers Qty    [id number]  OT THERAPEUTIC ACT EA 15 MIN 4/2/2024 Natalie Zambrano OT GO 1                 Natalie Zambrano OT  4/2/2024

## 2024-04-02 NOTE — THERAPY TREATMENT NOTE
Acute Care - Speech Language Pathology   Swallow Progress Note [facility]     Patient Name: Omkar Nava  : 1955  MRN: [id number]  Today's Date: 2024               Admit Date: 3/10/2024    Visit Dx:     ICD-10-CM ICD-9-CM   1. Acute respiratory failure with hypoxia  J96.01 518.81   2. History of dementia  Z86.59 V11.8   3. Pneumonia due to infectious organism, unspecified laterality, unspecified part of lung  J18.9 486   4. Hypothermia, initial encounter  T68.XXXA 991.6   5. Anemia, unspecified type  D64.9 285.9   6. Hyperkalemia  E87.5 276.7   7. Encephalopathy  G93.40 348.30   8. Protein-calorie malnutrition, unspecified severity  E46 263.9   9. Iron deficiency anemia, unspecified iron deficiency anemia type  D50.9 280.9   10. Dysphagia, unspecified type  R13.10 787.20   11. 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, without long-term current use of insulin    Orthostatic hypotension    Acute osteomyelitis of left foot    Type 2 diabetes mellitus    Essential hypertension    Psychotic disorder    Neurocognitive disorder    S/P transmetatarsal amputation of foot, left    Hypoxia    Abscess of left foot    Anemia of chronic disease    Aspiration pneumonia    Cellulitis of left toe    Cellulitis of lower extremity    Cervical spine pain    Chronic kidney disease    Closed head injury without loss of consciousness    Dementia    Dental cavities    Diarrhea of presumed infectious origin    Displaced fracture of proximal phalanx of left great toe, initial encounter for open fracture    Dysphagia    Erectile dysfunction    Fall    Gas gangrene    Generalized muscle weakness    Hallucinations    Hypertensive heart and chronic kidney disease 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 failure, with stage 1 through stage 4 chronic kidney disease, or  unspecified chronic kidney disease    Insomnia due to medical condition    Iron deficiency anemia    J15.0, ESBL Klebsiella pneumonia    Laceration without foreign body, left foot, subsequent encounter    Long term (current) use of insulin    Other acute osteomyelitis, left ankle and foot    Personal history of Methicillin resistant Staphylococcus aureus infection    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    Protein calorie malnutrition    Simple chronic bronchitis    Tobacco use    Type 2 diabetes mellitus with diabetic neuropathy, unspecified    Wound infection, posttraumatic    Type 2 diabetes mellitus with diabetic chronic kidney disease    Encephalopathy    Bilateral pneumonia    Acute kidney injury superimposed on chronic kidney disease    Acute type 1 respiratory failure     Past Medical History:   Diagnosis Date    Anemia     Dementia     Diabetes mellitus     Dysphagia     GERD (gastroesophageal reflux disease)     History of alcohol abuse     History of cocaine use     History of marijuana use     Hypertension     Osteomyelitis     Poor historian     records obtained from nursing home records & his family    Visual impairment      Past Surgical History:   Procedure Laterality Date    AMPUTATION FOOT Left 10/18/2022    Procedure: PARTIAL FIRST RAY AMPUTATION LEFT;  Surgeon: Yeison Petty MD;  Location:  [facility];  Service: Orthopedics;  Laterality: Left;    AMPUTATION FOOT Left 12/5/2022    Procedure: Transmetatarsal of Left Foot;  Surgeon: Yeison Petty MD;  Location:  [facility];  Service: Orthopedics;  Laterality: Left;    ENDOSCOPY N/A 3/14/2024    Procedure: ESOPHAGOGASTRODUODENOSCOPY WITH JEJUNAL TUBE INSERTION AT BEDSIDE;  Surgeon: Brunner, Mark I, MD;  Location: [facility] ENDOSCOPY;  Service: Gastroenterology;  Laterality: N/A;    ENDOSCOPY W/ PEG TUBE PLACEMENT N/A 4/1/2024    Procedure: ESOPHAGOGASTRODUODENOSCOPY WITH PERCUTANEOUS ENDOSCOPIC GASTROSTOMY TUBE INSERTION;  Surgeon: Brunner, Mark I, MD;  Location:  [facility];  Service: Gastroenterology;  Laterality: N/A;  EGD with PEG placement.  Secured at 3.5 cm    EYE SURGERY         SLP Recommendation and Plan  SLP Swallowing Diagnosis: suspected pharyngeal dysphagia, other (see comments) (04/02/24 1015)  SLP Diet Recommendation: NPO, long term alternate methods of nutrition/hydration (04/02/24 1015)  Recommended Precautions and Strategies: general aspiration precautions (04/02/24 1015)  SLP Rec. for Method of Medication Administration: meds via alternate route (04/02/24 1015)     Monitor for Signs of Aspiration: yes, notify SLP if any concerns (04/02/24 1015)     Swallow Criteria for Skilled Therapeutic Interventions Met: demonstrates skilled criteria (04/02/24 1015)  Anticipated Discharge Disposition (SLP): extended care facility (04/02/24 1015)  Rehab Potential/Prognosis, Swallowing: re-evaluate goals as necessary (04/02/24 1015)  Therapy Frequency (Swallow): 5 days per week (04/02/24 1015)  Predicted Duration Therapy Intervention (Days): until discharge (04/02/24 1015)  Oral Care Recommendations: Oral Care BID/PRN, Suction toothbrush (04/02/24 1015)        Daily Summary of Progress (SLP): progress toward functional goals is gradual (04/02/24 1015)               Treatment Assessment (SLP): continued (04/02/24 1015)  Treatment Assessment Comments (SLP): trialed with ice and water during exercises, cough and wet vocal quality throughout. Improved s/s with 3 second prep (04/02/24 1015)  Plan for Continued Treatment (SLP): continue treatment per plan of care (04/02/24 1015)         Plan of Care Reviewed With: patient, family  Progress: no change      SWALLOW EVALUATION (Last 72 Hours)       SLP Adult Swallow Evaluation       Row Name 04/02/24 1015                   Rehab Evaluation    Document Type therapy note (daily note)  -AV        Subjective Information no complaints  -AV        Patient Observations alert;cooperative  -AV        Patient/Family/Caregiver  Comments/Observations family present  -AV        Patient Effort adequate  -AV           Pain    Additional Documentation Pain Scale: FACES Pre/Post-Treatment (Group)  -AV           Pain Scale: FACES Pre/Post-Treatment    Pain: FACES Scale, Pretreatment 0-->no hurt  -AV        Posttreatment Pain Rating 0-->no hurt  -AV           SLP Evaluation Clinical Impression    SLP Swallowing Diagnosis suspected pharyngeal dysphagia;other (see comments)  -AV        Functional Impact risk of aspiration/pneumonia  -AV        Rehab Potential/Prognosis, Swallowing re-evaluate goals as necessary  -AV        Swallow Criteria for Skilled Therapeutic Interventions Met demonstrates skilled criteria  -AV           SLP Treatment Clinical Impressions    Treatment Assessment (SLP) continued  -AV        Treatment Assessment Comments (SLP) trialed with ice and water during exercises, cough and wet vocal quality throughout. Improved s/s with 3 second prep  -AV        Daily Summary of Progress (SLP) progress toward functional goals is gradual  -AV        Barriers to Overall Progress (SLP) Fatigue  -AV        Plan for Continued Treatment (SLP) continue treatment per plan of care  -AV        Care Plan Review care plan/treatment goals reviewed  -AV           Recommendations    Therapy Frequency (Swallow) 5 days per week  -AV        Predicted Duration Therapy Intervention (Days) until discharge  -AV        SLP Diet Recommendation NPO;long term alternate methods of nutrition/hydration  -AV        Recommended Precautions and Strategies general aspiration precautions  -AV        Oral Care Recommendations Oral Care BID/PRN;Suction toothbrush  -AV        SLP Rec. for Method of Medication Administration meds via alternate route  -AV        Monitor for Signs of Aspiration yes;notify SLP if any concerns  -AV        Anticipated Discharge Disposition (SLP) extended care facility  -AV                  User Key  (r) = Recorded By, (t) = Taken By, (c) = Cosigned  By      Initials Name Effective Dates    AV Marimar Humphreys, MS CCC-SLP 06/16/21 -                     EDUCATION  The patient has been educated in the following areas:   Dysphagia (Swallowing Impairment) Oral Care/Hydration.        SLP GOALS       Row Name 04/02/24 1015             (LTG) Patient will demonstrate functional swallow for    Diet Texture (Demonstrate functional swallow) pureed textures  -AV      Liquid viscosity (Demonstrate functional swallow) nectar/ mildly thick liquids  -AV      Hopkins (Demonstrate functional swallow) with 1:1 assist/ supervision  -AV      Time Frame (Demonstrate functional swallow) by discharge  -AV      Progress/Outcomes (Demonstrate functional swallow) progress slower than expected  -AV         (STG) Patient will tolerate therapeutic trials of    Consistencies Trialed (Tolerate therapeutic trials) thin liquids;nectar/ mildly thick liquids;pureed textures  -AV      Desired Outcome (Tolerate therapeutic trials) without signs/symptoms of aspiration;without signs of distress;with adequate oral prep/transit/clearance  -AV      Hopkins (Tolerate therapeutic trials) with 1:1 assist/ supervision  -AV      Time Frame (Tolerate therapeutic trials) 1 week  -AV      Progress/Outcomes (Tolerate therapeutic trials) progress slower than expected  -AV         (STG) Lingual Strengthening Goal 1 (SLP)    Activity (Lingual Strengthening Goal 1, SLP) increase lingual tone/sensation/control/coordination/movement  -AV      Increase Lingual Tone/Sensation/Control/Coordination/Movement lingual movement exercises  -AV      Hopkins/Accuracy (Lingual Strengthening Goal 1, SLP) with maximum cues (25-49% accuracy)  -AV      Time Frame (Lingual Strengthening Goal 1, SLP) 1 week  -AV      Progress/Outcomes (Lingual Strengthening Goal 1, SLP) progress slower than expected  -AV         (STG) Pharyngeal Strengthening Exercise Goal 1 (SLP)    Activity (Pharyngeal Strengthening Goal 1, SLP)  increase pharyngeal sensation;increase timing  -AV      Increase Pharyngeal Sensation gustatory stimulation (sour/cold)  -AV      Increase Timing prepping - 3 second prep or suck swallow or 3-step swallow  -AV      Henderson/Accuracy (Pharyngeal Strengthening Goal 1, SLP) with maximum cues (25-49% accuracy)  -AV      Time Frame (Pharyngeal Strengthening Goal 1, SLP) 1 week  -AV      Progress/Outcomes (Pharyngeal Strengthening Goal 1, SLP) progress slower than expected  -AV                User Key  (r) = Recorded By, (t) = Taken By, (c) = Cosigned By      Initials Name Provider Type    AV Marimar Humphreys MS CCC-SLP Speech and Language Pathologist                       Time Calculation:    Time Calculation- SLP       Row Name 04/02/24 1458             Time Calculation- SLP    SLP Start Time 1015  -AV      SLP Received On 04/02/24  -AV         Untimed Charges    [id number] Treatment Swallow Minutes 38  -AV         Total Minutes    Untimed Charges Total Minutes 38  -AV       Total Minutes 38  -AV                User Key  (r) = Recorded By, (t) = Taken By, (c) = Cosigned By      Initials Name Provider Type    AV Marimar Humphreys MS CCC-SLP Speech and Language Pathologist                    Therapy Charges for Today       Code Description Service Date Service Provider Modifiers Qty    [id number] HC ST TREATMENT SWALLOW 3 4/2/2024 Marimar Humphreys MS CCC-SLP GN 1                 Marimar Vega MS CCC-JOVANA  4/2/2024

## 2024-04-02 NOTE — PROGRESS NOTES
"Palliative Care Daily Progress Note     C/C: Patient awake and repeatedly asking to eat.     S: Medical record reviewed. Follow up visit for GOC in the context of complex medical decision making and symptom management. Events noted. Patient sitting up in chair after working with PT. Patient is alert and oriented to location and self, he does not know the year. He repeatedly asks for food and drink. PEG in place and TF infusing. Sister at bedside.     ROS: +hungry. Denies pain, nausea, shortness of breath.     O: Code Status:   Code Status and Medical Interventions:   Ordered at: 03/10/24 1302     Code Status (Patient has no pulse and is not breathing):    CPR (Attempt to Resuscitate)     Medical Interventions (Patient has pulse or is breathing):    Full Support      Advanced Directives: Advance Directive Status: Patient has advance directive, copy in chart   Goals of Care: Ongoing.   Palliative Performance Scale Score: 30%     BP (!) 145/102   Pulse 59   Temp 97.9 °F (36.6 °C) (Oral)   Resp 18   Ht 182.9 cm (72\")   Wt 92.1 kg (203 lb 0.7 oz)   SpO2 97%   BMI 27.54 kg/m²     Intake/Output Summary (Last 24 hours) at 4/2/2024 1157  Last data filed at 4/2/2024 1000  Gross per 24 hour   Intake 1330.2 ml   Output 1800 ml   Net -469.8 ml       PE:  General Appearance:    Patient laying in bed, alert, awakens easily, A/C ill appearing, NAD   HEENT:    NC/AT, EOMI, anicteric, MMM, face relaxed, NC in place   Neck:   supple, trachea midline, no JVD   Lungs:     CTA bilat, diminished in bases; respirations regular, even and unlabored; RR 16-18 on exam, 2LNC    Heart:    RRR, normal S1 and S2, no M/R/G, HR 61 on monitor   Abdomen:     Normal bowel sounds, soft, nontender, nondistended   G/U:   Deferred   MSK/Extremities:   Wasting, +1 BLE edema, no left metatarsals s/p amputation   Pulses:   Pulses palpable and equal bilaterally   Skin:   Warm, dry   Neurologic:   Alert, oriented x2, follows commands   Psych:   Calm, " appropriate,      Meds: Reviewed and changes noted    Labs:   Results from last 7 days   Lab Units 04/02/24  0431   WBC 10*3/mm3 10.11   HEMOGLOBIN g/dL 8.5*   HEMATOCRIT % 28.3*   PLATELETS 10*3/mm3 312     Results from last 7 days   Lab Units 04/02/24  0431   SODIUM mmol/L 149*   POTASSIUM mmol/L 4.6   CHLORIDE mmol/L 112*   CO2 mmol/L 28.0   BUN mg/dL 52*   CREATININE mg/dL 1.13   GLUCOSE mg/dL 144*   CALCIUM mg/dL 8.4*     Results from last 7 days   Lab Units 04/02/24  0431   SODIUM mmol/L 149*   POTASSIUM mmol/L 4.6   CHLORIDE mmol/L 112*   CO2 mmol/L 28.0   BUN mg/dL 52*   CREATININE mg/dL 1.13   CALCIUM mg/dL 8.4*   BILIRUBIN mg/dL 0.2   ALK PHOS U/L 338*   ALT (SGPT) U/L 213*   AST (SGOT) U/L 76*   GLUCOSE mg/dL 144*     Imaging Results (Last 72 Hours)       Procedure Component Value Units Date/Time    XR Chest 1 View [[id number]] Collected: 03/31/24 1021     Updated: 03/31/24 1025    Narrative:      XR CHEST 1 VW    Date of Exam: 3/31/2024 5:09 AM EDT    Indication: Follow up    Comparison: 3/29/2024    Findings:  Enteric tube descends below the diaphragm and beyond the field-of-view. Left IJ CVC overlies the SVC. Cardiac silhouette is magnified. Vague bilateral airspace disease may represent edema or multifocal infiltrates. No significant pleural effusion or   pneumothorax. Osseous structures are unchanged.      Impression:      Impression:  1.Vague bilateral airspace disease may represent edema or multifocal infiltrates. This appears similar or slightly worse since 3/29/2024.      Electronically Signed: Ryan Perales MD    3/31/2024 10:22 AM EDT    Workstation ID: [id number]                  Diagnostics: Reviewed    A:   Acute type 1 respiratory failure    Essential hypertension    Dementia    Dysphagia    J15.0, ESBL Klebsiella pneumonia    Protein calorie malnutrition    Type 2 diabetes mellitus with diabetic chronic kidney disease    Acute kidney injury superimposed on chronic kidney disease     68 y.o.  male with dementia, PNA, JAYY on CKD, dysphagia, respiratory failure.   S/S:   Dyspnea -currently on 2LNC  -daughter confirms re-intubate if respiratory decline  -she would not place Trach if unable to be weaned from MV     2. Dysphagia -PEG placed, TF infusing     3. Debility -PT/OT following     4. GOC -Full Code/Full Support -per discussion with daughter  3/29: daughter is in agreement with return to [facility]  3/29: long discussion regarding continued full treatment versus comfort focused plan of care  3/29:long discussion regarding code status in light of serious illness  4/2: GOC established, will sign off    P: Follow up visit. GOC established for ongoing full treatment/full code/full support. Plan to return to [facility]. Palliative Care will sign off.    Please do not hesitate to re-contact us regarding further sx mgmt or GOC needs.  Leilani Pritchett, APRN  4/2/2024  Time spent: 10 minutes

## 2024-04-02 NOTE — PLAN OF CARE
Goal Outcome Evaluation:  Plan of Care Reviewed With: patient        Progress: improving  Outcome Evaluation: Pt with good participation and progress toward goals during OT session. Pt was dependently transferred to chair via mechanical lift. The pt performed STS x2 with maxA x2, BUE support. Pt presents with a posterior lean throughout mobility. Pt remains below his functional baseline with generalized weakness, decreased activity tolerance, and balance deficits warranting continued IP OT services. Continue to recommend a d/c to SNF for best outcome.      Anticipated Discharge Disposition (OT): skilled nursing facility

## 2024-04-03 LAB
ALBUMIN SERPL-MCNC: 3.2 G/DL (ref 3.5–5.2)
ALBUMIN/GLOB SERPL: 0.9 G/DL
ALP SERPL-CCNC: 355 U/L (ref 39–117)
ALT SERPL W P-5'-P-CCNC: 178 U/L (ref 1–41)
ANION GAP SERPL CALCULATED.3IONS-SCNC: 7 MMOL/L (ref 5–15)
AST SERPL-CCNC: 59 U/L (ref 1–40)
BILIRUB SERPL-MCNC: 0.2 MG/DL (ref 0–1.2)
BUN SERPL-MCNC: 47 MG/DL (ref 8–23)
BUN/CREAT SERPL: 42 (ref 7–25)
CALCIUM SPEC-SCNC: 8.5 MG/DL (ref 8.6–10.5)
CHLORIDE SERPL-SCNC: 113 MMOL/L (ref 98–107)
CO2 SERPL-SCNC: 30 MMOL/L (ref 22–29)
CREAT SERPL-MCNC: 1.12 MG/DL (ref 0.76–1.27)
DEPRECATED RDW RBC AUTO: 50.7 FL (ref 37–54)
EGFRCR SERPLBLD CKD-EPI 2021: 72.5 ML/MIN/1.73
ERYTHROCYTE [DISTWIDTH] IN BLOOD BY AUTOMATED COUNT: 15.2 % (ref 12.3–15.4)
GLOBULIN UR ELPH-MCNC: 3.7 GM/DL
GLUCOSE BLDC GLUCOMTR-MCNC: 106 MG/DL (ref 70–130)
GLUCOSE BLDC GLUCOMTR-MCNC: 125 MG/DL (ref 70–130)
GLUCOSE BLDC GLUCOMTR-MCNC: 142 MG/DL (ref 70–130)
GLUCOSE BLDC GLUCOMTR-MCNC: 51 MG/DL (ref 70–130)
GLUCOSE BLDC GLUCOMTR-MCNC: 71 MG/DL (ref 70–130)
GLUCOSE BLDC GLUCOMTR-MCNC: 85 MG/DL (ref 70–130)
GLUCOSE BLDC GLUCOMTR-MCNC: 89 MG/DL (ref 70–130)
GLUCOSE BLDC GLUCOMTR-MCNC: 96 MG/DL (ref 70–130)
GLUCOSE SERPL-MCNC: 95 MG/DL (ref 65–99)
HCT VFR BLD AUTO: 27.1 % (ref 37.5–51)
HGB BLD-MCNC: 8.3 G/DL (ref 13–17.7)
MAGNESIUM SERPL-MCNC: 2.4 MG/DL (ref 1.6–2.4)
MCH RBC QN AUTO: 28 PG (ref 26.6–33)
MCHC RBC AUTO-ENTMCNC: 30.6 G/DL (ref 31.5–35.7)
MCV RBC AUTO: 91.6 FL (ref 79–97)
PHOSPHATE SERPL-MCNC: 3.5 MG/DL (ref 2.5–4.5)
PLATELET # BLD AUTO: 301 10*3/MM3 (ref 140–450)
PMV BLD AUTO: 11.7 FL (ref 6–12)
POTASSIUM SERPL-SCNC: 4.5 MMOL/L (ref 3.5–5.2)
PROT SERPL-MCNC: 6.9 G/DL (ref 6–8.5)
RBC # BLD AUTO: 2.96 10*6/MM3 (ref 4.14–5.8)
SODIUM SERPL-SCNC: 150 MMOL/L (ref 136–145)
WBC NRBC COR # BLD AUTO: 12.67 10*3/MM3 (ref 3.4–10.8)

## 2024-04-03 PROCEDURE — 63710000001 INSULIN REGULAR HUMAN PER 5 UNITS: Performed by: NURSE PRACTITIONER

## 2024-04-03 PROCEDURE — 82948 REAGENT STRIP/BLOOD GLUCOSE: CPT

## 2024-04-03 PROCEDURE — 84100 ASSAY OF PHOSPHORUS: CPT | Performed by: INTERNAL MEDICINE

## 2024-04-03 PROCEDURE — 63710000001 INSULIN DETEMIR PER 5 UNITS: Performed by: INTERNAL MEDICINE

## 2024-04-03 PROCEDURE — 94799 UNLISTED PULMONARY SVC/PX: CPT

## 2024-04-03 PROCEDURE — 63710000001 INSULIN REGULAR HUMAN PER 5 UNITS: Performed by: INTERNAL MEDICINE

## 2024-04-03 PROCEDURE — 85027 COMPLETE CBC AUTOMATED: CPT | Performed by: INTERNAL MEDICINE

## 2024-04-03 PROCEDURE — 94761 N-INVAS EAR/PLS OXIMETRY MLT: CPT

## 2024-04-03 PROCEDURE — 25010000002 GLUCAGON (RDNA) PER 1 MG: Performed by: INTERNAL MEDICINE

## 2024-04-03 PROCEDURE — 94664 DEMO&/EVAL PT USE INHALER: CPT

## 2024-04-03 PROCEDURE — 80053 COMPREHEN METABOLIC PANEL: CPT | Performed by: INTERNAL MEDICINE

## 2024-04-03 PROCEDURE — 99232 SBSQ HOSP IP/OBS MODERATE 35: CPT | Performed by: FAMILY MEDICINE

## 2024-04-03 PROCEDURE — 83735 ASSAY OF MAGNESIUM: CPT | Performed by: INTERNAL MEDICINE

## 2024-04-03 PROCEDURE — 25010000002 HEPARIN (PORCINE) PER 1000 UNITS: Performed by: INTERNAL MEDICINE

## 2024-04-03 RX ADMIN — ALBUTEROL SULFATE 2.5 MG: 2.5 SOLUTION RESPIRATORY (INHALATION) at 19:22

## 2024-04-03 RX ADMIN — Medication 30 ML: at 22:17

## 2024-04-03 RX ADMIN — HYDRALAZINE HYDROCHLORIDE 100 MG: 50 TABLET ORAL at 05:22

## 2024-04-03 RX ADMIN — HEPARIN SODIUM 5000 UNITS: 5000 INJECTION INTRAVENOUS; SUBCUTANEOUS at 22:14

## 2024-04-03 RX ADMIN — FLUOXETINE 20 MG: 20 CAPSULE ORAL at 08:42

## 2024-04-03 RX ADMIN — TORSEMIDE 5 MG: 10 TABLET ORAL at 08:43

## 2024-04-03 RX ADMIN — HUMAN INSULIN 6 UNITS: 100 INJECTION, SOLUTION SUBCUTANEOUS at 12:21

## 2024-04-03 RX ADMIN — FLUOXETINE 20 MG: 20 CAPSULE ORAL at 22:15

## 2024-04-03 RX ADMIN — TERAZOSIN HYDROCHLORIDE ANHYDROUS 5 MG: 5 CAPSULE ORAL at 08:42

## 2024-04-03 RX ADMIN — BRIMONIDINE TARTRATE 1 DROP: 2 SOLUTION/ DROPS OPHTHALMIC at 18:58

## 2024-04-03 RX ADMIN — HEPARIN SODIUM 5000 UNITS: 5000 INJECTION INTRAVENOUS; SUBCUTANEOUS at 14:54

## 2024-04-03 RX ADMIN — ARIPIPRAZOLE 5 MG: 10 TABLET ORAL at 08:42

## 2024-04-03 RX ADMIN — MINERAL OIL: 1000 LIQUID ORAL at 08:44

## 2024-04-03 RX ADMIN — CLONIDINE HYDROCHLORIDE 0.2 MG: 0.2 TABLET ORAL at 22:14

## 2024-04-03 RX ADMIN — BRIMONIDINE TARTRATE 1 DROP: 2 SOLUTION/ DROPS OPHTHALMIC at 08:44

## 2024-04-03 RX ADMIN — MINERAL OIL: 1000 LIQUID ORAL at 18:58

## 2024-04-03 RX ADMIN — AMLODIPINE BESYLATE 10 MG: 10 TABLET ORAL at 08:42

## 2024-04-03 RX ADMIN — VALPROIC ACID 150 MG: 250 SOLUTION ORAL at 22:13

## 2024-04-03 RX ADMIN — CLONIDINE HYDROCHLORIDE 0.2 MG: 0.2 TABLET ORAL at 05:21

## 2024-04-03 RX ADMIN — Medication 30 ML: at 08:46

## 2024-04-03 RX ADMIN — TERAZOSIN HYDROCHLORIDE ANHYDROUS 5 MG: 5 CAPSULE ORAL at 22:15

## 2024-04-03 RX ADMIN — Medication 1 MG: at 18:45

## 2024-04-03 RX ADMIN — INSULIN DETEMIR 8 UNITS: 100 INJECTION, SOLUTION SUBCUTANEOUS at 08:43

## 2024-04-03 RX ADMIN — LANSOPRAZOLE 30 MG: 15 TABLET, ORALLY DISINTEGRATING ORAL at 05:24

## 2024-04-03 RX ADMIN — MINERAL OIL: 1000 LIQUID ORAL at 14:54

## 2024-04-03 RX ADMIN — VALPROIC ACID 150 MG: 250 SOLUTION ORAL at 14:54

## 2024-04-03 RX ADMIN — HUMAN INSULIN 6 UNITS: 100 INJECTION, SOLUTION SUBCUTANEOUS at 00:31

## 2024-04-03 RX ADMIN — CARVEDILOL 12.5 MG: 12.5 TABLET, FILM COATED ORAL at 22:15

## 2024-04-03 RX ADMIN — TIMOLOL MALEATE 1 DROP: 5 SOLUTION/ DROPS OPHTHALMIC at 22:13

## 2024-04-03 RX ADMIN — CLONIDINE HYDROCHLORIDE 0.2 MG: 0.2 TABLET ORAL at 14:54

## 2024-04-03 RX ADMIN — CARVEDILOL 12.5 MG: 12.5 TABLET, FILM COATED ORAL at 08:42

## 2024-04-03 RX ADMIN — ALBUTEROL SULFATE 2.5 MG: 2.5 SOLUTION RESPIRATORY (INHALATION) at 07:37

## 2024-04-03 RX ADMIN — TIMOLOL MALEATE 1 DROP: 5 SOLUTION/ DROPS OPHTHALMIC at 08:44

## 2024-04-03 RX ADMIN — HYDRALAZINE HYDROCHLORIDE 100 MG: 50 TABLET ORAL at 14:54

## 2024-04-03 RX ADMIN — VALPROIC ACID 150 MG: 250 SOLUTION ORAL at 05:22

## 2024-04-03 RX ADMIN — BRIMONIDINE TARTRATE 1 DROP: 2 SOLUTION/ DROPS OPHTHALMIC at 22:13

## 2024-04-03 RX ADMIN — HEPARIN SODIUM 5000 UNITS: 5000 INJECTION INTRAVENOUS; SUBCUTANEOUS at 05:22

## 2024-04-03 RX ADMIN — HYDRALAZINE HYDROCHLORIDE 100 MG: 50 TABLET ORAL at 22:14

## 2024-04-03 NOTE — NURSING NOTE
Prior to central line removal, order for the removal of catheter was verified, patient was assessed, necessary materials were gathered and patient was educated regarding procedure .    Patient was positioned flat to ensure that the insertion site was at or below the level of the heart.    Hands were washed, clean gloves were applied and central line dressing was gently removed. Catheter exit site was not cultured.     A new pair of clean gloves were then applied. Insertion site was cleansed with 2% Chlorhexidine swab using a circular motion beginning at the insertion site and moving outward for 30 seconds and allowed to dry.     Clamp on line Present.     Patient was instructed to perform Valsalva maneuver as catheter was withdrawn.     The central line was grasped at the insertion site and slowly pulled outward parallel to the skin. Resistance was not met.    After central line was completely removed, a sterile 4x4 gauze pad was used to apply light pressure until bleeding stopped. At that time, petroleum-based gauze and a sterile occlusive dressing was applied to exit site.     Patient was instructed to keep dressing in place for at least 24 hours and to remain in a flat or reclining position for 30 minutes post-removal.     Catheter was inspected after removal and was intact. Tip of central line was not sent for culture. Patient tolerated procedure.

## 2024-04-03 NOTE — PROGRESS NOTES
[facility]  PROGRESS NOTE    Patient Name: Omkar Nava  : 1955  MRN: [id number]    Date of Admission: 3/10/2024  Primary Care Physician: Deann Cao MD    Subjective   Subjective     CC:  ICU transfer    HPI:  Patient is a 68-year-old admitted to the intensive care unit for aspiration pneumonia.  He now has a PEG tube.  He was transferred out of the unit overnight.  His sodium is 150 today.  He was n.p.o. yesterday and tube feeds have been held along with water in anticipation of PEG tube placement.  He is supposed to go back to [facility] when he is medically ready.  Today he denies chest pain shortness of breath or abdominal pain.  No nausea or vomiting.  Per nursing he has questing to eat and needs to be redirected.  He did have a central line in the ICU which was removed today.      Objective   Objective     Vital Signs:   Temp:  [96.6 °F (35.9 °C)-97.9 °F (36.6 °C)] 97.8 °F (36.6 °C)  Heart Rate:  [61-68] 64  Resp:  [16-18] 18  BP: (123-158)/(55-72) 123/61  Flow (L/min):  [2] 2     Physical Exam:  Constitutional: No acute distress, awake, alert  HENT: NCAT, mucous membranes moist  Respiratory: Clear to auscultation bilaterally, respiratory effort normal   Cardiovascular: RRR  Gastrointestinal: Positive bowel sounds, soft, nontender, nondistended  Musculoskeletal: No bilateral ankle edema  Psychiatric: Appropriate affect, cooperative  Neurologic: Tells me he was at [facility], he did know the year was , thought it was  generally weak, lying in bed, Cranial Nerves grossly intact to confrontation, speech clear  Skin: No rashes      Results Reviewed:  LAB RESULTS:      Lab 24  0544 24  0431 24  0509 24  0438 24  0528 24  1216 24  0544 24  0643 24  0525   WBC 12.67* 10.11 7.93 8.14 8.81  --  8.86  --  8.29   HEMOGLOBIN 8.3* 8.5* 8.3* 8.3* 8.7*   < > 8.7*   < > 6.8*   HEMATOCRIT  27.1* 28.3* 27.3* 27.3* 28.7*   < > 27.9*   < > 22.5*   PLATELETS 301 312 338 369 405  --  400  --  454*   NEUTROS ABS  --   --  5.72 6.18 6.82  --  6.87  --  6.09   IMMATURE GRANS (ABS)  --   --  0.05 0.05 0.07*  --  0.09*  --  0.08*   LYMPHS ABS  --   --  1.21 0.98 0.88  --  1.06  --  1.21   MONOS ABS  --   --  0.52 0.48 0.54  --  0.43  --  0.56   EOS ABS  --   --  0.37 0.42* 0.44*  --  0.35  --  0.32   MCV 91.6 94.0 91.0 91.6 92.0  --  90.3  --  91.5   CRP  --   --  2.69*  --   --   --   --   --   --    PROTIME  --   --   --   --   --   --  13.8  --   --     < > = values in this interval not displayed.         Lab 04/03/24  0544 04/02/24  0431 04/01/24  0509 03/31/24  0438 03/30/24  0528 03/29/24  1216 03/29/24  0544   SODIUM 150* 149* 142 145 142   < > 143   POTASSIUM 4.5 4.6 4.9 4.8 4.8   < > 5.4*   CHLORIDE 113* 112* 107 107 107   < > 108*   CO2 30.0* 28.0 29.0 30.0* 29.0   < > 28.0   ANION GAP 7.0 9.0 6.0 8.0 6.0   < > 7.0   BUN 47* 52* 58* 52* 54*   < > 50*   CREATININE 1.12 1.13 1.21 1.06 0.99   < > 1.12   EGFR 72.5 71.7 66.0 77.4 84.0   < > 72.5   GLUCOSE 95 144* 141* 133* 142*   < > 158*   CALCIUM 8.5* 8.4* 8.4* 8.1* 9.1   < > 8.4*   MAGNESIUM 2.4 2.5* 2.6* 2.3  --   --  2.4   PHOSPHORUS 3.5 3.8 3.9 3.8  --   --  4.1    < > = values in this interval not displayed.         Lab 04/03/24  0544 04/02/24  0431 04/01/24  0509 03/31/24  0438   TOTAL PROTEIN 6.9 6.6 6.6 5.8*   ALBUMIN 3.2* 2.9* 2.8* 3.0*   GLOBULIN 3.7 3.7 3.8 2.8   ALT (SGPT) 178* 213* 285* 254*   AST (SGOT) 59* 76* 156* 136*   BILIRUBIN 0.2 0.2 0.2 0.2   ALK PHOS 355* 338* 414* 335*         Lab 03/29/24  0544   PROTIME 13.8   INR 1.05         Lab 04/01/24  0509   TRIGLYCERIDES 32         Lab 03/28/24  0920   ABO TYPING O   RH TYPING Positive   ANTIBODY SCREEN Negative         Brief Urine Lab Results  (Last result in the past 365 days)        Color   Clarity   Blood   Leuk Est   Nitrite   Protein   CREAT   Urine HCG        03/11/24 1351              64.9                 Microbiology Results Abnormal       Procedure Component Value - Date/Time    Histoplasma Ag Ur - Urine, Urine, Catheter [[id number]] Collected: 03/14/24 1515    Lab Status: Final result Specimen: Urine, Catheter Updated: 03/20/24 1114     Histoplasma Galactomannan Ag Ur Negative    Narrative:      Test(s) 183562-Histoplasma Gal'shan Ag Ur  was developed and its performance characteristics determined  by [facility]. It has not been cleared or approved by the Food  and Drug Administration.  Performed at:  01 - [place]  [address] [place] [address]  [id number]  : Yasmine Joiner MD, Phone:  [phone]    Blastomyces Antigen - Urine, Urine, Catheter [[id number]] Collected: 03/14/24 1515    Lab Status: Final result Specimen: Urine, Catheter Updated: 03/19/24 0817     MVista(R) Blastomyces Ag None Detected ng/mL      Comment: Reference Interval: None Detected  Reportable Range: 0.31 ng/mL - 20.00 ng/mL  Results above 20.00 ng/mL are reported as 'Positive, Above  the Limit of Quantification'  This test was developed and its performance characteristics  determined by [facility]. It has not been cleared  or approved by the FDA; however, FDA clearance or approval is  not currently required for clinical use. The results are not  intended to be used as the sole means for clinical diagnosis  or patient decisions.        Interpretation Negative     Specimen Type URINE    Narrative:      Performed at:  01  [place]  [address] [place] [address]  [id number]  : Yasmine Joiner MD, Phone:  [phone]    Blood Culture - Blood, Hand, Right [[id number]]  (Normal) Collected: 03/10/24 0944    Lab Status: Final result Specimen: Blood from Hand, Right Updated: 03/15/24 1000     Blood Culture No growth at 5 days    Respiratory Panel PCR w/COVID-19(SARS-CoV-2) GIANNI/SIENNA/ANNA/PAD/COR/ASHIA In-House, NP Swab in UTM/VTM, 2 HR TAT - Swab, Nasopharynx [[id number]]   (Normal) Collected: 03/10/24 1415    Lab Status: Final result Specimen: Swab from Nasopharynx Updated: 03/10/24 1518     ADENOVIRUS, PCR Not Detected     Coronavirus 229E Not Detected     Coronavirus HKU1 Not Detected     Coronavirus NL63 Not Detected     Coronavirus OC43 Not Detected     COVID19 Not Detected     Human Metapneumovirus Not Detected     Human Rhinovirus/Enterovirus Not Detected     Influenza A PCR Not Detected     Influenza B PCR Not Detected     Parainfluenza Virus 1 Not Detected     Parainfluenza Virus 2 Not Detected     Parainfluenza Virus 3 Not Detected     Parainfluenza Virus 4 Not Detected     RSV, PCR Not Detected     Bordetella pertussis pcr Not Detected     Bordetella parapertussis PCR Not Detected     Chlamydophila pneumoniae PCR Not Detected     Mycoplasma pneumo by PCR Not Detected    Narrative:      In the setting of a positive respiratory panel with a viral infection PLUS a negative procalcitonin without other underlying concern for bacterial infection, consider observing off antibiotics or discontinuation of antibiotics and continue supportive care. If the respiratory panel is positive for atypical bacterial infection (Bordetella pertussis, Chlamydophila pneumoniae, or Mycoplasma pneumoniae), consider antibiotic de-escalation to target atypical bacterial infection.    COVID-19 and FLU A/B PCR, 1 HR TAT - Swab, Nasopharynx [[id number]]  (Normal) Collected: 03/10/24 0923    Lab Status: Final result Specimen: Swab from Nasopharynx Updated: 03/10/24 1017     COVID19 Not Detected     Influenza A PCR Not Detected     Influenza B PCR Not Detected    Narrative:      Fact sheet for providers: https://www.fda.gov/media/019204/download    Fact sheet for patients: https://www.fda.gov/media/328644/download    Test performed by PCR.            No radiology results from the last 24 hrs    Results for orders placed during the hospital encounter of 01/15/24    Adult Transthoracic Echo Complete With  Contrast if Necessary Per Protocol    Interpretation Summary    Left ventricular ejection fraction appears to be 56 - 60%.    Left ventricular wall thickness is consistent with hypertrophy.    Estimated right ventricular systolic pressure from tricuspid regurgitation is mildly elevated (35-45 mmHg).    There is a small (1-2cm) pericardial effusion.    No evidence for pericardial tamponade      Current medications:  Scheduled Meds:albuterol, 2.5 mg, Nebulization, Q6H - RT  amLODIPine, 10 mg, Nasogastric, Q24H  ARIPiprazole, 5 mg, Nasogastric, Daily  brimonidine, 1 drop, Both Eyes, TID   And  timolol, 1 drop, Both Eyes, BID  carvedilol, 12.5 mg, Nasogastric, Q12H  cloNIDine, 0.2 mg, Nasogastric, Q8H  FLUoxetine, 20 mg, Nasogastric, Q12H  heparin (porcine), 5,000 Units, Subcutaneous, Q8H  hydrALAZINE, 100 mg, Nasogastric, Q8H  insulin detemir, 8 Units, Subcutaneous, Q12H  insulin regular, 2-9 Units, Subcutaneous, Q6H  insulin regular, 6 Units, Subcutaneous, Q6H  lansoprazole, 30 mg, Nasogastric, Q AM  palliative care oral rinse, , Mouth/Throat, 4x Daily  ProSource No Carb, 30 mL, Per G Tube, BID  terazosin, 5 mg, Nasogastric, Q12H  torsemide, 5 mg, Nasogastric, Daily  Valproic Acid, 150 mg, Nasogastric, Q8H      Continuous Infusions:   PRN Meds:.  acetaminophen    dextrose    glucagon (human recombinant)    ipratropium-albuterol    labetalol    Potassium Replacement - Follow Nurse / BPA Driven Protocol    prochlorperazine    sennosides **AND** [DISCONTINUED] docusate sodium    Assessment & Plan   Assessment & Plan     Active Hospital Problems    Diagnosis  POA    **Acute type 1 respiratory failure [J96.01]  Yes    Acute kidney injury superimposed on chronic kidney disease [N17.9, N18.9]  Yes    Dysphagia [R13.10]  Yes    Dementia [F03.90]  Yes    Protein calorie malnutrition [E46]  Yes    J15.0, ESBL Klebsiella pneumonia [J15.0]  Yes    Essential hypertension [I10]  Yes    Type 2 diabetes mellitus with diabetic chronic  kidney disease [E11.22]  Yes      Resolved Hospital Problems   No resolved problems to display.        Brief Hospital Course to date:  Omkar Nava is a 68 y.o. male admitted initially to the intensive care unit with aspiration pneumonia.  Past medical history includes diabetes mellitus, complicated by blindness, osteomyelitis, CKD, dementia with psychosis, decreased mobility, heart failure with normal EF.  He underwent PEG tube on 4/1/2024.  On March 10 he required intubation.  Respiratory cultures grew ESBL Klebsiella.  He completed a 10-day course of antibiotics.  Hospital service assumed care on 4/3/2024 after transfer out of the ICU    Hypernatremia  Dehydration  -Increase free water per dietary/nutrition recommendations  -Continue tube feeds    Respiratory failure secondary to aspiration pneumonia  Dysphagia  -Required intubation in the intensive care unit, now extubated  -Completed 10-day course of antibiotics  -PEG tube was placed on 4/1/2024  -Central line removed on 4/3/2024  -Increase free water    Debility  Decreased mobility/bedridden  -PT and OT follow, the plan is to go back to [facility]    Diabetes mellitus  Blindness  History of osteomyelitis  History of dementia with psychosis  CKD      Expected Discharge Location and Transportation: [facility]  Expected Discharge   Expected Discharge Date: 4/3/2024; Expected Discharge Time:      DVT prophylaxis:  Medical and mechanical DVT prophylaxis orders are present.         AM-PAC 6 Clicks Score (PT): 8 (04/03/24 0800)    CODE STATUS:   Code Status and Medical Interventions:   Ordered at: 03/10/24 1302     Code Status (Patient has no pulse and is not breathing):    CPR (Attempt to Resuscitate)     Medical Interventions (Patient has pulse or is breathing):    Full Support       Graciela Dockery MD  04/03/24

## 2024-04-03 NOTE — CASE MANAGEMENT/SOCIAL WORK
Continued Stay Note  [facility]     Patient Name: Omkar Nava  MRN: [id number]  Today's Date: 4/3/2024    Admit Date: 3/10/2024    Plan: update   Discharge Plan       Row Name 04/03/24 0948       Plan    Plan update    Patient/Family in Agreement with Plan yes    Plan Comments Patient has 1000  EMS schedled to return to his facility.  Per MD, Na 150 not ready to discharge.  Spoke to facility liaison to advise who can accept patient back tomorrow.  Spoke with patient daughter, Idalia, to advise who requests to speak with MD today, MD notified of request.  Spoke with patient at bedside and advised he would be staying the hospital today and maybe get back to his facility tomorrow.  No other discharge needs verbalized at this time.  Called  EMS who has moved transport to Thursday 4/4 at 1300.  CM following.  Patient plan is to discharge back to his home facility [facility] tomorrow pending labs via  EMS scheduled for 1300.    Final Discharge Disposition Code 04 - intermediate care facility                   Discharge Codes    No documentation.                 Expected Discharge Date and Time       Expected Discharge Date Expected Discharge Time    Apr 3, 2024               Rosemarie Bowens, RN

## 2024-04-04 VITALS
OXYGEN SATURATION: 94 % | HEART RATE: 62 BPM | RESPIRATION RATE: 20 BRPM | WEIGHT: 203.04 LBS | HEIGHT: 72 IN | SYSTOLIC BLOOD PRESSURE: 119 MMHG | TEMPERATURE: 98.1 F | BODY MASS INDEX: 27.5 KG/M2 | DIASTOLIC BLOOD PRESSURE: 93 MMHG

## 2024-04-04 LAB
ANION GAP SERPL CALCULATED.3IONS-SCNC: 5 MMOL/L (ref 5–15)
BUN SERPL-MCNC: 59 MG/DL (ref 8–23)
BUN/CREAT SERPL: 61.5 (ref 7–25)
CALCIUM SPEC-SCNC: 8.7 MG/DL (ref 8.6–10.5)
CHLORIDE SERPL-SCNC: 110 MMOL/L (ref 98–107)
CO2 SERPL-SCNC: 29 MMOL/L (ref 22–29)
CREAT SERPL-MCNC: 0.96 MG/DL (ref 0.76–1.27)
EGFRCR SERPLBLD CKD-EPI 2021: 87.2 ML/MIN/1.73
GLUCOSE BLDC GLUCOMTR-MCNC: 123 MG/DL (ref 70–130)
GLUCOSE BLDC GLUCOMTR-MCNC: 126 MG/DL (ref 70–130)
GLUCOSE BLDC GLUCOMTR-MCNC: 140 MG/DL (ref 70–130)
GLUCOSE BLDC GLUCOMTR-MCNC: 63 MG/DL (ref 70–130)
GLUCOSE SERPL-MCNC: 64 MG/DL (ref 65–99)
POTASSIUM SERPL-SCNC: 4.2 MMOL/L (ref 3.5–5.2)
SODIUM SERPL-SCNC: 144 MMOL/L (ref 136–145)

## 2024-04-04 PROCEDURE — 80048 BASIC METABOLIC PNL TOTAL CA: CPT | Performed by: FAMILY MEDICINE

## 2024-04-04 PROCEDURE — 99239 HOSP IP/OBS DSCHRG MGMT >30: CPT | Performed by: NURSE PRACTITIONER

## 2024-04-04 PROCEDURE — 94799 UNLISTED PULMONARY SVC/PX: CPT

## 2024-04-04 PROCEDURE — 63710000001 INSULIN REGULAR HUMAN PER 5 UNITS: Performed by: NURSE PRACTITIONER

## 2024-04-04 PROCEDURE — 63710000001 INSULIN DETEMIR PER 5 UNITS: Performed by: INTERNAL MEDICINE

## 2024-04-04 PROCEDURE — 82948 REAGENT STRIP/BLOOD GLUCOSE: CPT

## 2024-04-04 PROCEDURE — 25010000002 GLUCAGON (RDNA) PER 1 MG: Performed by: INTERNAL MEDICINE

## 2024-04-04 PROCEDURE — 25010000002 HEPARIN (PORCINE) PER 1000 UNITS: Performed by: INTERNAL MEDICINE

## 2024-04-04 RX ORDER — CARVEDILOL 12.5 MG/1
12.5 TABLET ORAL EVERY 12 HOURS SCHEDULED
Start: 2024-04-04

## 2024-04-04 RX ORDER — CLONIDINE HYDROCHLORIDE 0.2 MG/1
0.2 TABLET ORAL EVERY 8 HOURS SCHEDULED
Start: 2024-04-04

## 2024-04-04 RX ORDER — TIMOLOL MALEATE 5 MG/ML
1 SOLUTION/ DROPS OPHTHALMIC 2 TIMES DAILY
Start: 2024-04-04

## 2024-04-04 RX ORDER — ACETAMINOPHEN 160 MG/5ML
650 SOLUTION ORAL EVERY 6 HOURS PRN
Status: CANCELLED
Start: 2024-04-04

## 2024-04-04 RX ORDER — TORSEMIDE 5 MG/1
5 TABLET ORAL DAILY
Start: 2024-04-05

## 2024-04-04 RX ORDER — FLUOXETINE HYDROCHLORIDE 20 MG/1
20 CAPSULE ORAL EVERY 12 HOURS
Start: 2024-04-04

## 2024-04-04 RX ORDER — TERAZOSIN 5 MG/1
5 CAPSULE ORAL EVERY 12 HOURS SCHEDULED
Start: 2024-04-04

## 2024-04-04 RX ORDER — ACETAMINOPHEN 160 MG/5ML
650 SOLUTION ORAL EVERY 6 HOURS PRN
Start: 2024-04-04

## 2024-04-04 RX ORDER — HYDROXYZINE HYDROCHLORIDE 25 MG/1
25 TABLET, FILM COATED ORAL 3 TIMES DAILY PRN
Start: 2024-04-04

## 2024-04-04 RX ORDER — LANSOPRAZOLE 30 MG/1
30 TABLET, ORALLY DISINTEGRATING, DELAYED RELEASE ORAL
Start: 2024-04-05

## 2024-04-04 RX ORDER — VALPROIC ACID 250 MG/5ML
150 SOLUTION ORAL EVERY 8 HOURS SCHEDULED
Start: 2024-04-04

## 2024-04-04 RX ORDER — ALBUTEROL SULFATE 2.5 MG/3ML
2.5 SOLUTION RESPIRATORY (INHALATION)
Start: 2024-04-04

## 2024-04-04 RX ORDER — HYDROXYZINE HYDROCHLORIDE 25 MG/1
25 TABLET, FILM COATED ORAL 3 TIMES DAILY PRN
Status: CANCELLED
Start: 2024-04-04

## 2024-04-04 RX ORDER — ARIPIPRAZOLE 5 MG/1
5 TABLET ORAL DAILY
Start: 2024-04-05

## 2024-04-04 RX ORDER — BRIMONIDINE TARTRATE 2 MG/ML
1 SOLUTION/ DROPS OPHTHALMIC 3 TIMES DAILY
Start: 2024-04-04

## 2024-04-04 RX ORDER — AMLODIPINE BESYLATE 10 MG/1
10 TABLET ORAL
Start: 2024-04-05

## 2024-04-04 RX ORDER — IPRATROPIUM BROMIDE AND ALBUTEROL SULFATE 2.5; .5 MG/3ML; MG/3ML
3 SOLUTION RESPIRATORY (INHALATION) EVERY 4 HOURS PRN
Start: 2024-04-04

## 2024-04-04 RX ORDER — HYDROXYZINE HYDROCHLORIDE 25 MG/1
25 TABLET, FILM COATED ORAL 3 TIMES DAILY PRN
Status: DISCONTINUED | OUTPATIENT
Start: 2024-04-04 | End: 2024-04-04 | Stop reason: HOSPADM

## 2024-04-04 RX ORDER — HYDRALAZINE HYDROCHLORIDE 100 MG/1
100 TABLET, FILM COATED ORAL EVERY 8 HOURS SCHEDULED
Start: 2024-04-04

## 2024-04-04 RX ORDER — AMINO ACIDS/PROTEIN HYDROLYS 11G-40/45
30 LIQUID IN PACKET (ML) ORAL 2 TIMES DAILY
Start: 2024-04-04

## 2024-04-04 RX ADMIN — HYDRALAZINE HYDROCHLORIDE 100 MG: 50 TABLET ORAL at 05:59

## 2024-04-04 RX ADMIN — ALBUTEROL SULFATE 2.5 MG: 2.5 SOLUTION RESPIRATORY (INHALATION) at 08:04

## 2024-04-04 RX ADMIN — LANSOPRAZOLE 30 MG: 15 TABLET, ORALLY DISINTEGRATING ORAL at 06:00

## 2024-04-04 RX ADMIN — CLONIDINE HYDROCHLORIDE 0.2 MG: 0.2 TABLET ORAL at 06:00

## 2024-04-04 RX ADMIN — Medication 1 MG: at 11:35

## 2024-04-04 RX ADMIN — HEPARIN SODIUM 5000 UNITS: 5000 INJECTION INTRAVENOUS; SUBCUTANEOUS at 06:00

## 2024-04-04 RX ADMIN — FLUOXETINE 20 MG: 20 CAPSULE ORAL at 08:17

## 2024-04-04 RX ADMIN — VALPROIC ACID 150 MG: 250 SOLUTION ORAL at 05:59

## 2024-04-04 RX ADMIN — TORSEMIDE 5 MG: 10 TABLET ORAL at 08:17

## 2024-04-04 RX ADMIN — INSULIN DETEMIR 8 UNITS: 100 INJECTION, SOLUTION SUBCUTANEOUS at 08:18

## 2024-04-04 RX ADMIN — ARIPIPRAZOLE 5 MG: 10 TABLET ORAL at 08:17

## 2024-04-04 RX ADMIN — AMLODIPINE BESYLATE 10 MG: 10 TABLET ORAL at 08:17

## 2024-04-04 RX ADMIN — Medication 30 ML: at 08:19

## 2024-04-04 RX ADMIN — BRIMONIDINE TARTRATE 1 DROP: 2 SOLUTION/ DROPS OPHTHALMIC at 08:18

## 2024-04-04 RX ADMIN — ALBUTEROL SULFATE 2.5 MG: 2.5 SOLUTION RESPIRATORY (INHALATION) at 00:53

## 2024-04-04 RX ADMIN — TERAZOSIN HYDROCHLORIDE ANHYDROUS 5 MG: 5 CAPSULE ORAL at 08:17

## 2024-04-04 RX ADMIN — HUMAN INSULIN 6 UNITS: 100 INJECTION, SOLUTION SUBCUTANEOUS at 00:21

## 2024-04-04 RX ADMIN — HUMAN INSULIN 6 UNITS: 100 INJECTION, SOLUTION SUBCUTANEOUS at 06:00

## 2024-04-04 RX ADMIN — HYDROXYZINE HYDROCHLORIDE 25 MG: 25 TABLET, FILM COATED ORAL at 08:17

## 2024-04-04 RX ADMIN — TIMOLOL MALEATE 1 DROP: 5 SOLUTION/ DROPS OPHTHALMIC at 08:18

## 2024-04-04 RX ADMIN — CARVEDILOL 12.5 MG: 12.5 TABLET, FILM COATED ORAL at 08:17

## 2024-04-04 NOTE — DISCHARGE SUMMARY
[facility] Medicine Services  TRANSFER SUMMARY    Patient Name: Omkar Nava  : 1955  MRN: [id number]    Date of Admission: 3/10/2024  Date of Discharge:  2024  Length of Stay: 25  Primary Care Physician: Deann Cao MD    Consults       Date and Time Order Name Status Description    3/28/2024  5:16 PM Inpatient Palliative Care MD Consult Completed     3/28/2024 12:31 PM Inpatient Gastroenterology Consult Completed     3/14/2024  1:44 PM Inpatient Gastroenterology Consult Completed     3/10/2024 10:52 PM Inpatient Nephrology Consult Completed     2024  1:45 PM Inpatient Cardiology Consult Completed     2024  3:21 PM Inpatient Neurology Consult General Completed             Hospital Course     Presenting Problem:   Healthcare-associated pneumonia [J18.9]  Acute respiratory failure with hypoxemia [J96.01]    Active Hospital Problems    Diagnosis  POA    **Acute type 1 respiratory failure [J96.01]  Yes    Acute kidney injury superimposed on chronic kidney disease [N17.9, N18.9]  Yes    Dysphagia [R13.10]  Yes    Dementia [F03.90]  Yes    Protein calorie malnutrition [E46]  Yes    J15.0, ESBL Klebsiella pneumonia [J15.0]  Yes    Essential hypertension [I10]  Yes    Type 2 diabetes mellitus with diabetic chronic kidney disease [E11.22]  Yes      Resolved Hospital Problems   No resolved problems to display.          Hospital Course:  Omkar Nava is a 68 y.o. male admitted initially to the intensive care unit with aspiration pneumonia.  Past medical history includes diabetes mellitus, complicated by blindness, osteomyelitis, CKD, dementia with psychosis, decreased mobility, heart failure with normal EF.  He underwent PEG tube on 2024.  On March 10 he required intubation.  Respiratory cultures grew ESBL Klebsiella.  He completed a 10-day course of antibiotics.  Hospital service assumed care on 4/3/2024 after transfer out of the ICU      Hypernatremia  Dehydration  --Likely secondary to decreased intake of free water.  - partner prior increased free water per dietary/nutrition recommendations  -Continue tube feeds and n.p.o. status.  --Sodium improved today and normal at 144.  Continue current regimen.  --Admit provider at facility recheck BMP in 2 to 3 days to further check for stabilization of sodium.     Respiratory failure secondary to aspiration pneumonia  Dysphagia  -Required intubation in the intensive care unit, now extubated  -Completed 10-day course of antibiotics  -PEG tube was placed on 4/1/2024.  Tolerating tube feeds.  -Central line removed on 4/3/2024  -Increased free water yesterday.  Labs improved.  Monitor.     Debility  Decreased mobility/bedridden  -PT and OT follow, the plan is to go back to [facility] today.     Diabetes mellitus (A1c 7.0)  --Glucose running on the low side postprandial and especially in the morning.  Scheduled basal and every 6 insulin while on tube feeds.  Will decrease basal/mealtime insulin slightly today.    Blindness  History of osteomyelitis  History of dementia with psychosis  --Stable, continue home meds    Hx HTN/HFpEF  -- Follows with Dr. Mcpherson with [facility] cardiology  -- Last seen 2/22/2024.  Had appointment for follow-up on 3/25/2024 but was missed due to being hospitalized.  -- Will reschedule follow-up on discharge today.    CKD  --Stable.     Pt was seen resting up in bed in no acute distress.  Awake and alert.  Minimally conversant.  States he is cold.  Going home to [facility] today.  Denies pain.  Tolerating tube feed.  Hemodynamically stable and afebrile.  Transferring on medications as below with follow-ups as noted.      Discharge Follow Up Recommendations for outpatient labs/diagnostics:  Patient is cleared for transfer back to [facility] today bowel services.    --To be seen by provider at facility on arrival, PCP 1 week of discharge  -- [facility]up with [facility] as  needed  -- Follow up with cardiology in 4 to 6 weeks  -- Follow-up with neurology as prior scheduled    Day of Discharge     HPI:   Pt was seen resting back in bed in no acute distress.  Chronically ill and debilitated elderly male.  Oriented to self only.  PEG with tube feeds infusing.  Transferring back to [facility] today.    Review of Systems   Unable to accurately assess due to baseline AMS    Vital Signs:   Temp:  [97.5 °F (36.4 °C)-98.1 °F (36.7 °C)] 98.1 °F (36.7 °C)  Heart Rate:  [59-68] 59  Resp:  [16-18] 18  BP: (112-148)/(61-93) 119/93     Physical Exam:  Constitutional: No acute distress, awake, alert.  Resting back in bed.  Chronically ill, weak and debilitated appearing male.  Appears much older than stated age.  HENT: NCAT, mucous membranes moist  Respiratory: Clear to auscultation bilaterally decreased at bases, respiratory effort normal on 2 LNC with sats 93%.  Cardiovascular: RRR  Gastrointestinal: Positive bowel sounds, soft, nontender, nondistended.  PEG tube in place with tube feed infusing.  Musculoskeletal: No bilateral ankle edema  Psychiatric: Flat affect, cooperative  Neurologic: Awake and alert.  Slow speech.  To tell me his name and that he is going home to [facility]. generally weak. Cranial Nerves grossly intact to confrontation, speech clear but slowed.  Skin: No rashes.  Multiple body surface area wounds with dressings in place.    Pertinent Results     Results from last 7 days   Lab Units 04/04/24  1007 04/03/24  0544 04/02/24  0431 04/01/24  0509 03/31/24  0438 03/30/24  0528 03/29/24  1216 03/29/24  0544   WBC 10*3/mm3  --  12.67* 10.11 7.93 8.14 8.81  --  8.86   HEMOGLOBIN g/dL  --  8.3* 8.5* 8.3* 8.3* 8.7* 8.8* 8.7*   HEMATOCRIT %  --  27.1* 28.3* 27.3* 27.3* 28.7* 28.4* 27.9*   PLATELETS 10*3/mm3  --  301 312 338 369 405  --  400   SODIUM mmol/L 144 150* 149* 142 145 142 145 143   POTASSIUM mmol/L 4.2 4.5 4.6 4.9 4.8 4.8 5.0 5.4*   CHLORIDE mmol/L 110* 113* 112* 107 107 107  109* 108*   CO2 mmol/L 29.0 30.0* 28.0 29.0 30.0* 29.0 30.0* 28.0   BUN mg/dL 59* 47* 52* 58* 52* 54* 50* 50*   CREATININE mg/dL 0.96 1.12 1.13 1.21 1.06 0.99 1.20 1.12   GLUCOSE mg/dL 64* 95 144* 141* 133* 142* 134* 158*   CALCIUM mg/dL 8.7 8.5* 8.4* 8.4* 8.1* 9.1 8.9 8.4*     Results from last 7 days   Lab Units 04/03/24  0544 04/02/24  0431 04/01/24  0509 03/31/24  0438 03/29/24  0544   BILIRUBIN mg/dL 0.2 0.2 0.2 0.2  --    ALK PHOS U/L 355* 338* 414* 335*  --    ALT (SGPT) U/L 178* 213* 285* 254*  --    AST (SGOT) U/L 59* 76* 156* 136*  --    PROTIME Seconds  --   --   --   --  13.8   INR   --   --   --   --  1.05     Results from last 7 days   Lab Units 04/01/24  0509   TRIGLYCERIDES mg/dL 32         Brief Urine Lab Results  (Last result in the past 365 days)        Color   Clarity   Blood   Leuk Est   Nitrite   Protein   CREAT   Urine HCG        03/11/24 1351             64.9                 Microbiology Results Abnormal       Procedure Component Value - Date/Time    Histoplasma Ag Ur - Urine, Urine, Catheter [[id number]] Collected: 03/14/24 1515    Lab Status: Final result Specimen: Urine, Catheter Updated: 03/20/24 1114     Histoplasma Galactomannan Ag Ur Negative    Narrative:      Test(s) 183562-Histoplasma Gal'shan Ag Ur  was developed and its performance characteristics determined  by [facility]. It has not been cleared or approved by the Food  and Drug Administration.  Performed at:  01 - [facility]  [address] [place] [address], [place]  [id number]  : Yasmine Joiner MD, Phone:  [phone]    Blastomyces Antigen - Urine, Urine, Catheter [[id number]] Collected: 03/14/24 1515    Lab Status: Final result Specimen: Urine, Catheter Updated: 03/19/24 0817     MVista(R) Blastomyces Ag None Detected ng/mL      Comment: Reference Interval: None Detected  Reportable Range: 0.31 ng/mL - 20.00 ng/mL  Results above 20.00 ng/mL are reported as 'Positive, Above  the Limit of Quantification'  This test  was developed and its performance characteristics  determined by [facility]. It has not been cleared  or approved by the FDA; however, FDA clearance or approval is  not currently required for clinical use. The results are not  intended to be used as the sole means for clinical diagnosis  or patient decisions.        Interpretation Negative     Specimen Type URINE    Narrative:      Performed at:  01 - [facility]  [address] [facility] [place]  [id number]  : Yasmine Joiner MD, Phone:  [phone]    Blood Culture - Blood, Hand, Right [[id number]]  (Normal) Collected: 03/10/24 0944    Lab Status: Final result Specimen: Blood from Hand, Right Updated: 03/15/24 1000     Blood Culture No growth at 5 days    Respiratory Panel PCR w/COVID-19(SARS-CoV-2) GIANNI/SIENNA/ANNA/PAD/COR/ASHIA In-House, NP Swab in UTM/VTM, 2 HR TAT - Swab, Nasopharynx [[id number]]  (Normal) Collected: 03/10/24 1415    Lab Status: Final result Specimen: Swab from Nasopharynx Updated: 03/10/24 1518     ADENOVIRUS, PCR Not Detected     Coronavirus 229E Not Detected     Coronavirus HKU1 Not Detected     Coronavirus NL63 Not Detected     Coronavirus OC43 Not Detected     COVID19 Not Detected     Human Metapneumovirus Not Detected     Human Rhinovirus/Enterovirus Not Detected     Influenza A PCR Not Detected     Influenza B PCR Not Detected     Parainfluenza Virus 1 Not Detected     Parainfluenza Virus 2 Not Detected     Parainfluenza Virus 3 Not Detected     Parainfluenza Virus 4 Not Detected     RSV, PCR Not Detected     Bordetella pertussis pcr Not Detected     Bordetella parapertussis PCR Not Detected     Chlamydophila pneumoniae PCR Not Detected     Mycoplasma pneumo by PCR Not Detected    Narrative:      In the setting of a positive respiratory panel with a viral infection PLUS a negative procalcitonin without other underlying concern for bacterial infection, consider observing off antibiotics or discontinuation of  antibiotics and continue supportive care. If the respiratory panel is positive for atypical bacterial infection (Bordetella pertussis, Chlamydophila pneumoniae, or Mycoplasma pneumoniae), consider antibiotic de-escalation to target atypical bacterial infection.    COVID-19 and FLU A/B PCR, 1 HR TAT - Swab, Nasopharynx [[id number]]  (Normal) Collected: 03/10/24 0923    Lab Status: Final result Specimen: Swab from Nasopharynx Updated: 03/10/24 1017     COVID19 Not Detected     Influenza A PCR Not Detected     Influenza B PCR Not Detected    Narrative:      Fact sheet for providers: https://www.fda.gov/media/407831/download    Fact sheet for patients: https://www.fda.gov/media/967306/download    Test performed by PCR.            Imaging Results (All)       Procedure Component Value Units Date/Time    XR Chest 1 View [[id number]] Collected: 03/31/24 1021     Updated: 03/31/24 1025    Narrative:      XR CHEST 1 VW    Date of Exam: 3/31/2024 5:09 AM EDT    Indication: Follow up    Comparison: 3/29/2024    Findings:  Enteric tube descends below the diaphragm and beyond the field-of-view. Left IJ CVC overlies the SVC. Cardiac silhouette is magnified. Vague bilateral airspace disease may represent edema or multifocal infiltrates. No significant pleural effusion or   pneumothorax. Osseous structures are unchanged.      Impression:      Impression:  1.Vague bilateral airspace disease may represent edema or multifocal infiltrates. This appears similar or slightly worse since 3/29/2024.      Electronically Signed: Ryan Perales MD    3/31/2024 10:22 AM EDT    Workstation ID: [id number]    XR Chest 1 View [[id number]] Collected: 03/29/24 0515     Updated: 03/29/24 0519    Narrative:      XR CHEST 1 VW    Date of Exam: 3/29/2024 1:46 AM EDT    Indication: Respiratory failure    Comparison: 3/27/2024.    Findings:  Support hardware projects unchanged. Hazy bilateral airspace opacities persist suggesting mild edema pattern with trace  effusion noted on the left. There is no new focal consolidation or distinct pneumothorax.      Impression:      Impression:  Support hardware projects unchanged. Hazy bilateral airspace opacities persist suggesting mild edema pattern with trace effusion noted on the left. There is no new focal consolidation or distinct pneumothorax.        Electronically Signed: Boom Rodriguez MD    3/29/2024 5:16 AM EDT    Workstation ID: [id number]    XR Chest 1 View [[id number]] Collected: 03/27/24 0801     Updated: 03/27/24 0812    Narrative:      XR CHEST 1 VW    Date of Exam: 3/27/2024 3:20 AM EDT    Indication: R/O Infiltrates    Comparison: 3/23/2024    Findings:  Left internal jugular central venous catheter and weighted feeding tube are again seen. Interval extubation. Cardiac size is similar to prior exam. Left basilar opacity with small left pleural effusion. Trace right pleural effusion. This appears slightly   improved from prior examination. No evidence of acute osseous abnormalities. Visualized upper abdomen is unremarkable.      Impression:      Impression:  Left basilar opacity with small left and trace right pleural effusion. This appears slightly improved from prior examination.      Electronically Signed: Anil Krishnamurthy MD    3/27/2024 8:09 AM EDT    Workstation ID: [id number]    XR Chest 1 View [[id number]] Collected: 03/23/24 0913     Updated: 03/23/24 0919    Narrative:      XR CHEST 1 VW    Date of Exam: 3/23/2024 3:59 AM EDT    Indication: to confirm placement of ET Tube -Manual Prone Protocol    Comparison: Chest radiograph 3/22/2024.    Findings:  Endotracheal tube tip overlies the midthoracic trachea, 4.8 cm above the sahra. Feeding catheter courses below the diaphragm with the tip excluded from the field-of-view. Left IJ approach central venous catheter unchanged in position with tip overlying   the SVC. Cardiomediastinal silhouette is unchanged. Small bilateral pleural effusions with bibasilar  atelectasis, unchanged. Slightly improved mixed interstitial and airspace disease. No pneumothorax. Osseous structures are unchanged.      Impression:      Impression:  Unchanged appropriate position of endotracheal tube. Similar small bilateral pleural effusions and bibasilar atelectasis. Slightly improved mixed interstitial and airspace disease.      Electronically Signed: Anjum Prado MD    3/23/2024 9:16 AM EDT    Workstation ID: [id number]    XR Chest 1 View [[id number]] Collected: 03/22/24 0806     Updated: 03/22/24 0810    Narrative:      XR CHEST 1 VW    Date of Exam: 3/22/2024 3:33 AM EDT    Indication: to confirm placement of ET Tube -Manual Prone Protocol    Comparison: 3/21/2024    Findings:  ET tube, left IJ catheter, and feeding tube appear to be in satisfactory position. Heart is mildly enlarged. Diffuse and extensive pulmonary interstitial disease pattern may represent edema or ARDS, and is not significantly changed. No pneumothorax is   seen.      Impression:      Impression:    1. ET tube remains in good position at the level of the clavicles.    2. Diffuse and extensive pulmonary interstitial disease unchanged.      Electronically Signed: Wisam Oquendo MD    3/22/2024 8:07 AM EDT    Workstation ID: [id number]    XR Chest 1 View [[id number]] Collected: 03/21/24 0628     Updated: 03/21/24 0632    Narrative:      XR CHEST 1 VW    Date of Exam: 3/21/2024 3:17 AM EDT    Indication: to confirm placement of ET Tube -Manual Prone Protocol    Comparison: 1 day prior.    Findings:  Support hardware projects unchanged. Aeration appears similar with hazy diffuse opacities and small layering effusion on the left. There is no distinct new focal airspace consolidation or pneumothorax. Unchanged heart and mediastinal contours.      Impression:      Impression:  No significant interval change.      Electronically Signed: Boom Rodriguez MD    3/21/2024 6:29 AM EDT    Workstation ID: [id number]    XR Chest 1 View [[id number]]  Collected: 03/20/24 0742     Updated: 03/20/24 0747    Narrative:      XR CHEST 1 VW    Date of Exam: 3/20/2024 3:29 AM EDT    Indication: to confirm placement of ET Tube -Manual Prone Protocol    Comparison: 3/19/2024    Findings:  ET tube is in good position midway between the clavicles and sahra. Left IJ catheter remains in the distal SVC. Feeding tube is seen below the left hemidiaphragm. Heart is upper normal size. There is diffuse and extensive pulmonary interstitial disease,   resembling interstitial edema, and there is stable focal dense atelectasis in the medial left lower lobe. No pneumothorax is seen.      Impression:      Impression:    1. ET tube in good position.    2. Extensive but stable pulmonary interstitial disease pattern, and stable left basilar atelectasis.      Electronically Signed: Wisam Oquendo MD    3/20/2024 7:44 AM EDT    Workstation ID: [id number]    XR Chest 1 View [[id number]] Collected: 03/19/24 0735     Updated: 03/19/24 0740    Narrative:      XR CHEST 1 VW    Date of Exam: 3/19/2024 1:35 AM EDT    Indication: to confirm placement of ET Tube -Manual Prone Protocol    Comparison: 3/18/2024.    Findings:  ET tube is in place with the tip several centimeters above the sahra. NG tube is directed to the left upper quadrant although tip is not included. Left internal jugular line tip is in the lower SVC, unchanged. There are continued bilateral lung   infiltrates with effusions. There is stable mild cardiomegaly.      Impression:      Impression:  No significant change from prior study.      Electronically Signed: Jennifer Bo MD    3/19/2024 7:37 AM EDT    Workstation ID: [id number]    XR Chest 1 View [[id number]] Collected: 03/18/24 0559     Updated: 03/18/24 0603    Narrative:      XR CHEST 1 VW    Date of Exam: 3/18/2024 2:13 AM EDT    Indication: to confirm placement of ET Tube -Manual Prone Protocol    Comparison: 1 day prior.    Findings:  Support hardware projects unchanged. Mildly  worsened aeration with the appearance of increased layering bilateral pleural effusions. No new focal consolidation otherwise. No distinct pneumothorax. Unchanged cardiac enlargement.      Impression:      Impression:  Support hardware projects unchanged. Mildly worsened aeration with the appearance of increased layering bilateral pleural effusions. No new focal consolidation otherwise. No distinct pneumothorax. Unchanged cardiac enlargement.          Electronically Signed: Boom Rodriguez MD    3/18/2024 6:00 AM EDT    Workstation ID: [id number]    XR Chest 1 View [[id number]] Collected: 03/17/24 1013     Updated: 03/17/24 1018    Narrative:      XR CHEST 1 VW    Date of Exam: 3/17/2024 4:10 AM EDT    Indication: to confirm placement of ET Tube -Manual Prone Protocol    Comparison: 3/16/2024 at 0337 hours    Findings:  There is mild improvement in aeration throughout the lung bases. Residual infiltrates are noted bilaterally with diffuse interstitial changes. The cardiac silhouette and mediastinum are stable. The endotracheal tube is stable in position. The Dobbhoff   feeding tube extends below the diaphragm to the stomach. The left IJ central line is stable in position.      Impression:      Impression:  1.Mild improvement in aeration within the lung bases.  2.Residual multifocal infiltrates and interstitial changes are seen bilaterally.  3.Stable positioning of support lines/tubes.      Electronically Signed: Renny Canales MD    3/17/2024 10:15 AM EDT    Workstation ID: [id number]    XR Chest 1 View [[id number]] Collected: 03/16/24 0810     Updated: 03/16/24 0815    Narrative:      XR CHEST 1 VW    Date of Exam: 3/16/2024 3:25 AM EDT    Indication: to confirm placement of ET Tube -Manual Prone Protocol    Comparison: 3/15/2024    Findings:  ET tube, feeding tube and left IJ catheter appear to be in satisfactory position. The heart shadow appears borderline enlarged. There is a diffuse interstitial disease pattern  present, whether interstitial edema or changes of ARDS, stable to slightly   improved from the prior study. No pneumothorax is seen.          Impression:      Impression:    1. ET tube remains in good position at the level of the clavicles.    2. Diffuse pulmonary interstitial disease, stable to slightly improved from prior study. No evidence of pneumothorax.      Electronically Signed: Wisam Oquendo MD    3/16/2024 8:12 AM EDT    Workstation ID: [id number]    XR Chest 1 View [[id number]] Collected: 03/15/24 0540     Updated: 03/15/24 0545    Narrative:      XR CHEST 1 VW    Date of Exam: 3/15/2024 1:05 AM EDT    Indication: to confirm placement of ET Tube -Manual Prone Protocol    Comparison: CT 1 day prior.    Findings:  Support hardware projects unchanged. There is persistent diffuse dense airspace disease most confluent in the lower lobes where there are also small layering adjacent pleural effusions. There is no distinct pneumothorax. Unchanged heart and mediastinal   contours.      Impression:      Impression:  Support hardware projects unchanged. There is persistent diffuse dense airspace disease most confluent in the lower lobes where there are also small layering adjacent pleural effusions. There is no distinct pneumothorax. Unchanged heart and mediastinal   contours.          Electronically Signed: Boom Rodriguez MD    3/15/2024 5:42 AM EDT    Workstation ID: [id number]    XR Abdomen KUB [[id number]] Collected: 03/14/24 1823     Updated: 03/14/24 1828    Narrative:      XR ABDOMEN KUB    Date of Exam: 3/14/2024 5:53 PM EDT    Indication: NG placement    Comparison: Same day CT abdomen pelvis.    Findings:  Weighted tip feeding catheter terminates within the proximal jejunum. Bowel gas and lung bases are is unchanged compared to same-day CT.      Impression:      Impression:  Weighted tip feeding catheter terminates in the proximal jejunum.      Electronically Signed: Anjum Prado MD    3/14/2024 6:25 PM EDT     Workstation ID: [id number]    CT Angiogram Chest [[id number]] Collected: 03/14/24 1000     Updated: 03/14/24 1024    Narrative:      CT ABDOMEN PELVIS W CONTRAST, CT ANGIOGRAM CHEST    Date of Exam: 3/14/2024 9:24 AM EDT    Indication: bowel obstruction.    Comparison: Chest and abdomen radiograph from earlier today, CT abdomen/pelvis from March 11, 2024, and CT chest from March 10, 2024    Technique: Axial CT images were obtained of the chest abdomen and pelvis following the uneventful intravenous administration of 85 mL Isovue-370. Reconstructed coronal and sagittal images were also obtained. Automated exposure control and iterative   construction methods were used.      Findings:  Chest:    An endotracheal tube is in place. There are diffuse bilateral consolidations that have worsened from prior CT. There are small bilateral pleural effusions.    The heart is enlarged. The great vessels are normal in caliber. A left internal jugular catheter has its tip at the mid SVC. There is no evidence of pulmonary embolus. There is mediastinal lymphadenopathy, likely reactive.    No aggressive osseous lesions are identified.    Abdomen/pelvis:    The liver, gallbladder, adrenal glands, kidneys, spleen, and pancreas are unremarkable.    An NG tube has its tip in the stomach. The stomach is mildly distended. The small bowel appears normal in caliber and configuration. The colon appears normal. The appendix appears normal. There is no ascites or loculated collection. No abnormally   enlarged lymph nodes are identified.    The rectum and prostate are unremarkable. A urinary Story catheter is in place. There is some wall thickening of the urinary bladder.    No aggressive osseous lesions are identified.      Impression:      Impression:  1.Negative for pulmonary embolus.  2.Diffuse bilateral consolidations have worsened from prior CT from March 10, 2024, suggesting pneumonia, pulmonary edema, and/or ARDS.  3.Small bilateral pleural  effusions.  4.Cardiomegaly.  5.Mediastinal lymphadenopathy, likely reactive.  6.No evidence of bowel obstruction.  7.Some wall thickening of urinary bladder, which could represent cystitis.        Electronically Signed: Omkar Umana MD    3/14/2024 10:21 AM EDT    Workstation ID: [id number]    CT Abdomen Pelvis With Contrast [[id number]] Collected: 03/14/24 1000     Updated: 03/14/24 1024    Narrative:      CT ABDOMEN PELVIS W CONTRAST, CT ANGIOGRAM CHEST    Date of Exam: 3/14/2024 9:24 AM EDT    Indication: bowel obstruction.    Comparison: Chest and abdomen radiograph from earlier today, CT abdomen/pelvis from March 11, 2024, and CT chest from March 10, 2024    Technique: Axial CT images were obtained of the chest abdomen and pelvis following the uneventful intravenous administration of 85 mL Isovue-370. Reconstructed coronal and sagittal images were also obtained. Automated exposure control and iterative   construction methods were used.      Findings:  Chest:    An endotracheal tube is in place. There are diffuse bilateral consolidations that have worsened from prior CT. There are small bilateral pleural effusions.    The heart is enlarged. The great vessels are normal in caliber. A left internal jugular catheter has its tip at the mid SVC. There is no evidence of pulmonary embolus. There is mediastinal lymphadenopathy, likely reactive.    No aggressive osseous lesions are identified.    Abdomen/pelvis:    The liver, gallbladder, adrenal glands, kidneys, spleen, and pancreas are unremarkable.    An NG tube has its tip in the stomach. The stomach is mildly distended. The small bowel appears normal in caliber and configuration. The colon appears normal. The appendix appears normal. There is no ascites or loculated collection. No abnormally   enlarged lymph nodes are identified.    The rectum and prostate are unremarkable. A urinary Story catheter is in place. There is some wall thickening of the urinary  bladder.    No aggressive osseous lesions are identified.      Impression:      Impression:  1.Negative for pulmonary embolus.  2.Diffuse bilateral consolidations have worsened from prior CT from March 10, 2024, suggesting pneumonia, pulmonary edema, and/or ARDS.  3.Small bilateral pleural effusions.  4.Cardiomegaly.  5.Mediastinal lymphadenopathy, likely reactive.  6.No evidence of bowel obstruction.  7.Some wall thickening of urinary bladder, which could represent cystitis.        Electronically Signed: Omkar Umana MD    3/14/2024 10:21 AM EDT    Workstation ID: [id number]    XR Chest 1 View [[id number]] Collected: 03/14/24 0617     Updated: 03/14/24 0622    Narrative:      XR CHEST 1 VW    Date of Exam: 3/14/2024 2:11 AM EDT    Indication: pna, ventilator    Comparison: 1 day prior.    Findings:  Support hardware projects unchanged. Relatively diffuse bilateral airspace opacities persist, similar to comparison. There is no enlarging effusion or distinct pneumothorax. Unchanged apparent cardiac enlargement.      Impression:      Impression:  No significant interval change.      Electronically Signed: Boom Rodriguez MD    3/14/2024 6:19 AM EDT    Workstation ID: [id number]    XR Abdomen KUB [[id number]] Collected: 03/14/24 0615     Updated: 03/14/24 0620    Narrative:      XR ABDOMEN KUB    Date of Exam: 3/14/2024 2:23 AM EDT    Indication: r/o ileus    Comparison: CT 3/11/2024.    Findings:  Prominent stool burden noted throughout the colon, including prominent rectal stool burden with fairly diffuse gas filling and borderline distention of the remaining colon, consistent with provided history of ileus. There is no definite small bowel   distention to specifically suggest small bowel obstruction. There is no overt pneumoperitoneum.      Impression:      Impression:  Prominent stool burden noted throughout the colon, including prominent rectal stool burden with fairly diffuse gas filling and borderline distention of  the remaining colon, consistent with provided history of ileus. There is no definite small bowel   distention to specifically suggest small bowel obstruction. There is no overt pneumoperitoneum.      Electronically Signed: Boom Rodriguez MD    3/14/2024 6:17 AM EDT    Workstation ID: [id number]    XR Chest 1 View [[id number]] Collected: 03/13/24 0648     Updated: 03/13/24 0652    Narrative:      XR CHEST 1 VW    Date of Exam: 3/13/2024 3:07 AM EDT    Indication: Intubated Patient    Comparison: 1 day prior.    Findings:  Support hardware projects unchanged. Similar aeration with small effusion on the left and adjacent basilar atelectasis in addition to scattered bilateral patchy airspace disease. No distinct pneumothorax. Unchanged heart and mediastinal contours.      Impression:      Impression:  No significant interval change      Electronically Signed: Boom Rodriguez MD    3/13/2024 6:49 AM EDT    Workstation ID: [id number]    XR Chest 1 View [[id number]] Collected: 03/12/24 0822     Updated: 03/12/24 0835    Narrative:      XR CHEST 1 VW    Date of Exam: 3/12/2024 4:30 AM EDT    Indication: Intubated Patient    Comparison:  3/11/2024    Findings:  Endotracheal tube, left internal jugular central venous catheter and nasogastric are again seen.    Cardiac size is similar to prior exam. Similar hazy opacities throughout the right greater than left lungs. Similar small layering pleural effusions. No substantial pneumothorax. No evidence of acute osseous abnormalities. Visualized upper abdomen is   unremarkable.      Impression:      Impression:  Similar hazy opacities in the right greater than left lungs and small layering bilateral pleural effusions.      Electronically Signed: Anil Krishnamurthy MD    3/12/2024 8:32 AM EDT    Workstation ID: [id number]    US Renal Bilateral [[id number]] Collected: 03/11/24 2153     Updated: 03/11/24 2157    Narrative:      US RENAL BILATERAL    Date of Exam: 3/11/2024 5:25 PM  EDT    Indication: renal failure.    Comparison: CT abdomen pelvis 3/11/2024.    Technique: Grayscale and color Doppler ultrasound evaluation of the kidneys and urinary bladder was performed.      Findings:  Right kidney measures 12.1 cm. Left kidney measures 11.3 cm. Bilaterally, there is normal corticomedullary differentiation and no evidence of hydronephrosis. Urinary bladder is decompressed by Story catheter.      Impression:      Impression:  No hydronephrosis.      Electronically Signed: Anjum Prado MD    3/11/2024 9:54 PM EDT    Workstation ID: [id number]    XR Chest 1 View [[id number]] Collected: 03/11/24 0806     Updated: 03/11/24 0811    Narrative:      XR CHEST 1 VW    Date of Exam: 3/11/2024 4:58 AM EDT    Indication: Intubated Patient    Comparison: 3/10/2024    Findings:  ET tube NG tube and left IJ catheter appear in satisfactory position. The heart is enlarged. Dense groundglass and airspace disease of the lungs appears stable on the right, slightly improved on the left. No pneumothorax or effusion is seen. Heart is   mildly enlarged in size. Pulmonary vasculature is obscured.      Impression:      Impression:  Extensive bilateral pulmonary disease, mildly improved on the left.      Electronically Signed: Wisam Oquendo MD    3/11/2024 8:08 AM EDT    Workstation ID: [id number]    CT Abdomen Pelvis Without Contrast [[id number]] Collected: 03/11/24 0258     Updated: 03/11/24 0304    Narrative:      CT ABDOMEN PELVIS WO CONTRAST    Date of Exam: 3/11/2024 1:17 AM EDT    Indication: Flank pain, kidney stone suspected.    Comparison: 3/10/2024.    Technique: Axial CT images were obtained of the abdomen and pelvis without the administration of contrast. Reconstructed coronal and sagittal images were also obtained. Automated exposure control and iterative construction methods were used.      Findings:  Lung Bases:     Airspace consolidations present within the bilateral lower lobes with patchy airspace disease seen  within the right middle lobe and the lingula. Small bilateral pleural effusions are present.    Limited evaluation of the solid organs due to lack of intravenous contrast.  Liver:  Liver is normal in size and CT density. No focal lesions.    Biliary/Gallbladder:    The gallbladder is normal without evidence of radiopaque stones. The biliary tree is nondilated.    Spleen:  Spleen is normal in size and CT density.    Pancreas:    Pancreas is normal. There is no evidence of pancreatic mass or peripancreatic fluid.    Kidneys:    Kidneys are normal in size. There are no stones or hydronephrosis. Residual contrast present within the collecting system.    Adrenals:    Adrenal glands are unremarkable.    Retroperitoneal/Lymph Nodes/Vasculature:    No retroperitoneal adenopathy is identified.    Gastrointestinal/Mesentery:    The bowel loops are non-dilated without wall thickening or mass. The appendix appears within normal limits. No evidence of obstruction. No free air. No mesenteric fluid collections identified. Moderate to large stool burden present, most pronounced   within the rectum. No evidence of hernia. Enteric tube present within the stomach. Oral contrast present. No evidence of extravasation. Small amount of free fluid is seen tracking along the right paracolic gutter. There is diverticulosis of the   descending colon. No definite inflammatory changes identified. No evidence of pneumatosis.    Bladder:    The bladder is normal.    Genital:     Unremarkable          Bony Structures:     Visualized bony structures are consistent with the patient's age.        Impression:      Impression:  1.No acute intra-abdominal or intrapelvic process.  2.Moderate to large stool burden present, most pronounced within the rectum, consistent with constipation.  3.Small amount of free fluid seen tracking along the right paracolic gutter, which may related to third spacing of fluid. Reactive change cannot be excluded. No evidence  of obstruction or obvious wall thickening.  4.Bibasilar pneumonia with small bilateral pleural effusions.  5.Ancillary findings as described above.        Electronically Signed: Princess Jeong MD    3/11/2024 3:01 AM EDT    Workstation ID: [id number]    CT Head Without Contrast [[id number]] Collected: 03/11/24 0256     Updated: 03/11/24 0300    Narrative:      CT HEAD WO CONTRAST    Date of Exam: 3/11/2024 1:17 AM EDT    Indication: Mental status change, unknown cause.    Comparison: 2/5/2024.    Technique: Axial CT images were obtained of the head without contrast administration.  Automated exposure control and iterative construction methods were used.      Findings:  There is no evidence of hemorrhage. There is no mass effect or midline shift.    There is no extracerebral collection.    Ventricles are normal in size and configuration for patient's stated age.      Posterior fossa is within normal limits.    Calvarium and skull base appear intact.   Visualized sinuses show no air fluid levels. Visualized orbits are unremarkable.      Impression:      Impression:  No acute intracranial process identified.        Electronically Signed: Princess Jeong MD    3/11/2024 2:57 AM EDT    Workstation ID: [id number]    XR Abdomen KUB [[id number]] Collected: 03/10/24 2356     Updated: 03/11/24 0000    Narrative:      XR ABDOMEN KUB    Date of Exam: 3/10/2024 11:41 PM EDT    Indication: NG Advanced    Comparison: 3/10/2024.    Findings:  Advancement of the enteric tube appropriately placed within the stomach.      Impression:      Impression:  Enteric tube appropriately placed within the stomach.        Electronically Signed: Princess Jeong MD    3/10/2024 11:56 PM EDT    Workstation ID: [id number]    XR Abdomen KUB [[id number]] Collected: 03/10/24 2316     Updated: 03/10/24 2320    Narrative:      XR ABDOMEN KUB    Date of Exam: 3/10/2024 10:48 PM EDT    Indication: NG Placement    Comparison: 1/16/2024.    Findings:  Nonobstructive bowel  gas pattern. Enteric tube present within the stomach. The sideport is at the level of the gastroesophageal junction.      Impression:      Impression:  Enteric tube within the stomach. Sideport is at the level of the gastroesophageal junction. Advancement of 5 to 6 cm suggested for optimal positioning.        Electronically Signed: Princess Jeong MD    3/10/2024 11:17 PM EDT    Workstation ID: [id number]    XR Chest 1 View [[id number]] Collected: 03/10/24 2216     Updated: 03/10/24 2220    Narrative:      XR CHEST 1 VW    Date of Exam: 3/10/2024 9:46 PM EDT    Indication: Intubation & Central line placement verification    Comparison: 3/10/2024.    Findings:  Endotracheal tube tip is in the mid to distal trachea. The heart appears enlarged. Bilateral lower lobe and inferior upper lobe airspace disease present bilaterally, progressed as compared to the previous study. Bilateral pleural effusions likely   present. This appears new. No pneumothorax. Left internal jugular CVC catheter present with the tip in the upper SVC. No pneumothorax.      Impression:      Impression:  1.Interval placement of a left internal jugular CVC catheter with the tip in the upper SVC. No pneumothorax.  2.Interval worsening of bilateral airspace disease, likely related to worsening pneumonia.        Electronically Signed: Princess Jeong MD    3/10/2024 10:17 PM EDT    Workstation ID: [id number]    XR Chest 1 View [[id number]] Collected: 03/10/24 1639     Updated: 03/10/24 1643    Narrative:      XR CHEST 1 VW    Date of Exam: 3/10/2024 4:22 PM EDT    Indication: decrease oxygenation.    Comparison: 3/10/2024 0924 hours. CT chest 3/10/2024    Findings:  Worsening infiltrates are seen throughout the lungs bilaterally with diffuse interstitial changes. Cardiomegaly is again noted. The mediastinum is stable.      Impression:      Impression:  Worsening extensive infiltrates are seen throughout the lungs bilaterally with diffuse interstitial changes. The  findings indicate worsening atypical/viral infection or multifocal pneumonia. The change may indicate developing associated respiratory   distress syndrome.      Electronically Signed: Renny Canales MD    3/10/2024 4:40 PM EDT    Workstation ID: [id number]    CT Angiogram Chest [[id number]] Collected: 03/10/24 1022     Updated: 03/10/24 1031    Narrative:      CT ANGIOGRAM CHEST    Date of Exam: 3/10/2024 10:09 AM EDT    Indication: hypoxia.    Comparison: 2/5/2024    Technique: CTA of the chest was performed after the uneventful intravenous administration of 85 mL Isovue-370. Reconstructed coronal and sagittal images were also obtained. In addition, a 3-D volume rendered image was created for interpretation.   Automated exposure control and iterative reconstruction methods were used.      FINDINGS:  No significant focal filling defects are identified to indicate the presence of pulmonary embolism.    There are extensive groundglass appearing multifocal infiltrates throughout the lungs bilaterally with interstitial changes. Many of these infiltrates or rounded in morphology. More pronounced consolidations are seen in the lung bases. Small bilateral   pleural effusions are noted. The findings suggest developing atypical/viral infection or multifocal pneumonia and may indicate changes of Covid 19 infection.    Mildly prominent hilar and mediastinal lymph nodes are noted which are likely reactive. No significant axillary lymphadenopathy is seen. The heart and great vessels are stable. Mild coronary artery calcifications are noted. The thyroid gland is stable.   The esophagus is unremarkable.    The limited evaluation of the upper abdomen demonstrates no evidence for acute abnormality.    No acute osseous abnormalities are observed.      Impression:      1.No evidence for pulmonary embolism.  2.Evidence for ongoing atypical/viral infection versus multifocal pneumonia and potential changes of Covid 19 infection. Recommend  correlation for signs or symptoms of acute infection and follow-up to ensure improvement/resolution.        Electronically Signed: Renny Canales MD    3/10/2024 10:28 AM EDT    Workstation ID: [id number]    XR Chest 1 View [[id number]] Collected: 03/10/24 0936     Updated: 03/10/24 0940    Narrative:      XR CHEST 1 VW    Date of Exam: 3/10/2024 9:13 AM EDT    Indication: SOA triage protocol    Comparison: CT chest 2/5/2024    FINDINGS:  There are continued ill-defined multifocal airspace infiltrates throughout the lungs bilaterally with associated diffuse interstitial changes. No pleural effusions are observed. The cardiac silhouette and mediastinum are unremarkable. No acute osseous   abnormalities are identified.      Impression:      1.Continued ill-defined multifocal airspace infiltrates bilaterally with diffuse interstitial changes. The findings suggest ongoing atypical/viral infection or multifocal pneumonia versus changes of CHF and pulmonary edema. Recommend correlation for   signs or symptoms of acute infection and follow-up to ensure improvement/resolution.        Electronically Signed: Renny Canales MD    3/10/2024 9:37 AM EDT    Workstation ID: [id number]            Results for orders placed during the hospital encounter of 01/15/24    Adult Transthoracic Echo Complete With Contrast if Necessary Per Protocol    Interpretation Summary    Left ventricular ejection fraction appears to be 56 - 60%.    Left ventricular wall thickness is consistent with hypertrophy.    Estimated right ventricular systolic pressure from tricuspid regurgitation is mildly elevated (35-45 mmHg).    There is a small (1-2cm) pericardial effusion.    No evidence for pericardial tamponade          Discharge Details        Discharge Medications        New Medications        Instructions Start Date   acetaminophen 160 MG/5ML solution  Commonly known as: TYLENOL  Replaces: acetaminophen 325 MG tablet   650 mg, Per G Tube, Every 6 Hours  PRN      albuterol (2.5 MG/3ML) 0.083% nebulizer solution  Commonly known as: PROVENTIL  Replaces: albuterol sulfate  (90 Base) MCG/ACT inhaler   2.5 mg, Nebulization, Every 6 Hours - RT      brimonidine 0.2 % ophthalmic solution  Commonly known as: ALPHAGAN   1 drop, Both Eyes, 3 Times Daily      hydrOXYzine 25 MG tablet  Commonly known as: ATARAX   25 mg, Per G Tube, 3 Times Daily PRN      insulin detemir 100 UNIT/ML injection  Commonly known as: LEVEMIR   6 Units, Subcutaneous, Every 12 Hours Scheduled      insulin regular 100 UNIT/ML injection  Commonly known as: humuLIN R,novoLIN R   2-9 Units, Subcutaneous, Every 6 Hours Scheduled      insulin regular 100 UNIT/ML injection  Commonly known as: humuLIN R,novoLIN R   3 Units, Subcutaneous, Every 6 Hours Scheduled      ipratropium-albuterol 0.5-2.5 mg/3 ml nebulizer  Commonly known as: DUO-NEB   3 mL, Nebulization, Every 4 Hours PRN      lansoprazole 30 MG Tablet Delayed Release Dispersible disintegrating tablet  Commonly known as: PREVACID SOLUTAB   30 mg, Nasogastric, Every Early Morning   Start Date: April 5, 2024     PALLIATIVE CARE ORAL RINSE ([facility])   5 mL, Mouth/Throat, 4 Times Daily      ProSource No Carb liquid   30 mL, Per G Tube, 2 Times Daily      sennosides 8.8 MG/5ML syrup  Commonly known as: SENOKOT   10 mL, Per G Tube, Nightly PRN      terazosin 5 MG capsule  Commonly known as: HYTRIN   5 mg, Per G Tube, Every 12 Hours Scheduled      timolol 0.5 % ophthalmic solution  Commonly known as: TIMOPTIC   1 drop, Both Eyes, 2 Times Daily      Valproic Acid 250 MG/5ML solution syrup  Commonly known as: DEPAKENE   150 mg, Nasogastric, Every 8 Hours Scheduled             Changes to Medications        Instructions Start Date   amLODIPine 10 MG tablet  Commonly known as: NORVASC  What changed: how to take this   10 mg, Per G Tube, Every 24 Hours Scheduled   Start Date: April 5, 2024     ARIPiprazole 5 MG tablet  Commonly known as: ABILIFY  What changed:  how to take this   5 mg, Per G Tube, Daily   Start Date: April 5, 2024     carvedilol 12.5 MG tablet  Commonly known as: COREG  What changed:   how to take this  when to take this   12.5 mg, Per G Tube, Every 12 Hours Scheduled      cloNIDine 0.2 MG tablet  Commonly known as: CATAPRES  What changed:   medication strength  how much to take  how to take this  when to take this  reasons to take this  additional instructions   0.2 mg, Per G Tube, Every 8 Hours Scheduled      FLUoxetine 20 MG capsule  Commonly known as: PROzac  What changed:   how to take this  when to take this  additional instructions   20 mg, Per G Tube, Every 12 Hours      hydrALAZINE 100 MG tablet  Commonly known as: APRESOLINE  What changed:   medication strength  how much to take  how to take this  when to take this   100 mg, Per G Tube, Every 8 Hours Scheduled      torsemide 5 MG tablet  Commonly known as: DEMADEX  What changed:   medication strength  how much to take  how to take this   5 mg, Per G Tube, Daily   Start Date: April 5, 2024            Stop These Medications      acetaminophen 325 MG tablet  Commonly known as: TYLENOL  Replaced by: acetaminophen 160 MG/5ML solution     albuterol sulfate  (90 Base) MCG/ACT inhaler  Commonly known as: PROVENTIL HFA;VENTOLIN HFA;PROAIR HFA  Replaced by: albuterol (2.5 MG/3ML) 0.083% nebulizer solution     atorvastatin 20 MG tablet  Commonly known as: LIPITOR     brimonidine-timolol 0.2-0.5 % ophthalmic solution  Commonly known as: COMBIGAN     Cholecalciferol 50 MCG (2000 UT) tablet     divalproex 125 MG DR tablet  Commonly known as: DEPAKOTE     docusate sodium 100 MG capsule  Commonly known as: COLACE     famotidine 20 MG tablet  Commonly known as: PEPCID     ferrous sulfate 325 (65 FE) MG tablet     Insulin Lispro 100 UNIT/ML injection  Commonly known as: humaLOG     melatonin 5 MG tablet tablet     QUEtiapine 25 MG tablet  Commonly known as: SEROquel     Remedy Antimicrobial Cleanser 0.12 %  liquid  Generic drug: Benzalkonium Chloride     Remedy Nutrashield 1 % cream  Generic drug: dimethicone     Remedy Skin Repair 1.5 % cream  Generic drug: Dimethicone     thiamine 100 MG tablet  tablet  Commonly known as: VITAMIN B-1     traZODone 50 MG tablet  Commonly known as: DESYREL              No Known Allergies      Discharge Disposition:  Rehab Facility or Unit (DC - External)    Discharge Diet:  Diet Order   Procedures    NPO Diet NPO Type: Tube Feeding       Discharge Activity:  Activity Instructions       Activity as Tolerated      Measure Blood Pressure                CODE STATUS:    Code Status and Medical Interventions:   Ordered at: 03/10/24 1302     Code Status (Patient has no pulse and is not breathing):    CPR (Attempt to Resuscitate)     Medical Interventions (Patient has pulse or is breathing):    Full Support         Future Appointments   Date Time Provider Department Center   4/4/2024  2:30 PM [facility] 12  [facility]   5/28/2024  9:00 AM Farzana Easley MD [facility]       Additional Instructions for the Follow-ups that You Need to Schedule       Discharge Follow-up with PCP   As directed       Currently Documented PCP:    Deann Cao MD    PCP Phone Number:    [phone]     Follow Up Details: to be seen by provider at SNF on arrival, PCP 1 week of dc        Discharge Follow-up with Specialty: Patient recently missed cardiology follow-up.  Please reschedule with [facility] in 4-6 weeks   As directed      Specialty: Patient recently missed cardiology follow-up.  Please reschedule with [facility] in 4-6 weeks        Discharge Follow-up with Specialty: f/u [facility] as needed.   As directed      Specialty: f/u [facility] as needed.                Electronically signed by NARGIS Gunn, 04/04/24, 11:56 AM EDT.      Time Spent on Discharge: I spent 50 minutes on this discharge activity which included: face-to-face encounter  with the patient, reviewing the data in the system, coordination of the care with the nursing staff as well as consultants, documentation, and entering orders.

## 2024-04-04 NOTE — CASE MANAGEMENT/SOCIAL WORK
"Case Management Discharge Note      Final Note: Patient has orders for discharge. Spoke to facility liaison to advise discharge orders have been placed and ambulance time who can received patient today. Spoke with patient daughter, Idalia, via telephone to advise of discharge and transport time at 1430. No new dsicharge needs verbalized. Spoke with patient at bedside and advised he would be returning to [facility] today and ambulance time. Patient verbalizes, \"ok\". Patient plan is to discharge back to [facility] today via  EMS scheduled for 1430. Nursing to call report to [phone]. Facility liaison will pull d/c summary from EP[facility].         Selected Continued Care - Admitted Since 3/10/2024       Destination Coordination complete.      Service Provider Selected Services Address Phone Fax Patient Preferred    [facility] [address], [facility] [address] [phone] --              Durable Medical Equipment    No services have been selected for the patient.                Dialysis/Infusion    No services have been selected for the patient.                Home Medical Care    No services have been selected for the patient.                Therapy    No services have been selected for the patient.                Community Resources    No services have been selected for the patient.                Community & DME    No services have been selected for the patient.                    Selected Continued Care - Prior Encounters Includes continued care and service providers with selected services from prior encounters from 12/11/2023 to 4/4/2024      Discharged on 2/13/2024 Admission date: 2/5/2024 - Discharge disposition: Intermediate Care       Destination       Service Provider Selected Services Address Phone Fax Patient Preferred    [facility] [address] [facility] [address] [phone] --                      " Discharged on 1/21/2024 Admission date: 1/15/2024 - Discharge disposition: Skilled Nursing Facility (DC - External)      Destination       Service Provider Selected Services Address Phone Fax Patient Preferred    [facility] [address] [facility] [address] [phone] --       Internal Comment last updated by Rosemarie Yarbrough, RN 1/18/2024 1150    Does not have a bed hold    1/18: Spoke with Keyla, SNF to LTC for now. Not med ready today.                                           Final Discharge Disposition Code: 04 - intermediate care facility

## 2024-04-05 NOTE — PROGRESS NOTES
Clinical Nutrition     Nutrition Support Assessment  Reason for Visit: Follow-up protocol, EN    Patient Name: Omkar Nava  YOB: 1955  MRN: [id number]  Date of Encounter: 04/04/24 21:36 EDT  Admission date: 3/10/2024    Comment: Pt has been on EN rec per MD Novasource Renal 47 ml/hr Water at 30 ml ev 2 hr w 2 Prosource/da over 20 hr  in ICU  to supply: 940 ml for   2000 Kcal 108% est need, 115 g % est need, 0 fiber 677 ml Water in  total ml Free Water.     Note watch condition / lytes. Pt may tolerate non specialty formula out of ICU delivery over 22 hr:  Isosource 1.5 goal 50 ml/hr Water at 40 ml/hr 2 Prosource/da for 1100 ml to supply   1770 Kcal 96% est need, 105 g % est need, 16.7 g Fiber, 836 ml Water in EN 1716 total ml Free Water.     Nutrition Assessment   Admission Diagnosis:  Healthcare-associated pneumonia [J18.9]  Acute respiratory failure with hypoxemia [J96.01]      Problem List:    Acute type 1 respiratory failure    Essential hypertension    Dementia    Dysphagia    J15.0, ESBL Klebsiella pneumonia    Protein calorie malnutrition    Type 2 diabetes mellitus with diabetic chronic kidney disease    Acute kidney injury superimposed on chronic kidney disease        PMH:   He  has a past medical history of Anemia, Dementia, Diabetes mellitus, Dysphagia, GERD (gastroesophageal reflux disease), History of alcohol abuse, History of cocaine use, History of marijuana use, Hypertension, Osteomyelitis, Poor historian, and Visual impairment.    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      Applicable Nutrition Concerns:   Skin:  Oral: history of dysphagia - came in with chocking incident   GI: came with constipation; improved, now with diarrhea    Applicable Interval  History:   (3/10) Intubated   (3/11) EN started - Novasource Renal   (3/20) NG replaced with NJ  (3/25) Changed to standard formula- Isosource 1.5   Extubated   SLP- NPO  (3/28) [facility]  (4/1 ) PEG    Reported/Observed/Food/Nutrition Related History:     4/4) Discharged prior to visit.    4/2) Tolerating EN back at goal after PEG placed yesterday. Hypernatremic today, will give more water flushes. Creatinine remains stable. PAB normalized from low on admission with slightly elevated CRP.     4/1) Phos 2.3, Creatinine 1.44, BUN 48     3/30) PEG pending.    3/28) One day documentation consistent w > 20 hr EN delivery. Watch for trend. Note BS cont elevated however less than prior w no dextrose infusion.     3/25) Pt continues tolerating EN. Is having significant FMS output. Fiber previously added was d/c per Intensivist. Renal fx improved, discussed in MDR Intensivist to change to standard formula. BG running high insulin changes made. Also receiving D5 infusion for previous hypernatremia, Na WNL today. IC was completed and needs reassessed. At time of this note pt has been extubated. SLP has seen and continues to recommend NPO    3/22) Tolerating EN. Phos dropping past 3 days, critically low this am and to be replaced, K low as well. Creatinine WNL today. Suspect low lytes 2/2 renal formula, however hesitant to change at this time given prolonged poor renal fx in setting of renal disease. Will add prosource to better meet protein needs and may assist with lytes as well. If no improvement in next 72 hr, will consider formula change. Pt now with FMS, will add some fiber for stool formation. Documented weights continue to be higher than suspected actual, several liters positive. Ordered IC.     3/21) Tolerating EN at goal. Bowels moving. NG replaced with NJ.      3/19) Pt tolerating EN without issue. Remains intubated/sedated. Bowels moving. Renal fx remains poor, nephrology following and increased  water flushes over the weekend. Still hypernatremic, will increase further.     3/15) Pt with abd. distention yesterday and EN was held. Imagining showed mild excess fluid in bowel and stomach, stool burden. No BM yet, receiving senokot, colace, and miralax. Gastroenterology placed NJ in EGD yesterday. Discussed in MDR, Intensivist okay with restarting slow/low. Pt with hypoglycemia while EN was on hold and D20 infusion added, discussed plan to stop this as EN advances to goal. Renal fx labs slightly worsened today. O2 sats improved and no current plans for proning pt.      3/14) Remains intubated, off sedation.  Overall improved renal status.  Patient was taken to CT of abdomen this morning; not clear reason, no documentation of any GI issues.    Discussed with RN caring for patient today. Reports patient was starting to have abdominal distention yesterday, EN was turned off; turned back on at some point. She noted patient with increased distention this morning which was not the case yesterday.  CT of abdomen and KUB results noted. It seem patient is still with significant stool burden.  RN giving ordered bowel regiment now - not given yesterday per MAR records?     3/11) Pt presents in ARF, intubated/sedated. Intensivist consulted for EN and has already placed order for Novasource which RD concurs with. Pt from NH and came to ED after choking incident. Pt with severe dementia and aggression. Advanced chronic diseases HF, CKD, DM, dysphagia. Pt is blind.   Pt has NG. Did have some emesis on POA, KUB showed stool burden, pt now has had several BMs. No further emesis. Pt with significantly high Potassium on POA, improved with several doses lokelma to 6.0 today. Renal fx labs poor, A/C renal failure per Nephrology. Pt was receiving propofol but this was stopped, plan to transition to precedex and fentanyl. Pt was also receiving NS @ 100 ml/hr which was d/c this am. No current vasopressor support.     Labs    Labs  "Reviewed: Yes     Results from last 7 days   Lab Units 04/04/24  1007 04/03/24  0544 04/02/24  0431 04/01/24  0509   GLUCOSE mg/dL 64* 95 144* 141*   BUN mg/dL 59* 47* 52* 58*   CREATININE mg/dL 0.96 1.12 1.13 1.21   SODIUM mmol/L 144 150* 149* 142   CHLORIDE mmol/L 110* 113* 112* 107   POTASSIUM mmol/L 4.2 4.5 4.6 4.9   PHOSPHORUS mg/dL  --  3.5 3.8 3.9   MAGNESIUM mg/dL  --  2.4 2.5* 2.6*   ALT (SGPT) U/L  --  178* 213* 285*       Results from last 7 days   Lab Units 04/03/24  0544 04/02/24  0431 04/01/24  0509 04/01/24  0508   ALBUMIN g/dL 3.2* 2.9* 2.8*  --    PREALBUMIN mg/dL  --   --   --  23.4   CRP mg/dL  --   --  2.69*  --    TRIGLYCERIDES mg/dL  --   --  32  --          Results from last 7 days   Lab Units 04/04/24  1231 04/04/24  1129 04/04/24  0526 04/04/24  0015 04/03/24 2211 04/03/24 2020   GLUCOSE mg/dL 123 63* 140* 126 85 96     Lab Results   Lab Value Date/Time    HGBA1C 7.00 (H) 10/16/2022 0432    HGBA1C 8.7 (H) 06/25/2022 0242    HGBA1C 13.4 04/14/2022 1058                   Medications    Medications Reviewed: No pt discharged prior to visit  Pertinent: most recent: Insulin, Prevacid Demadex, Depakene   Infusion:   PRN:     Intake/Ouptut 24 hrs (0701 - 0700)   I&O's Reviewed: Yes      Stool x 3 on 4/3    Anthropometrics     Height: Height: 182.9 cm (72\")  Last Filed Weight: Weight: 92.1 kg (203 lb 0.7 oz) (04/02/24 0342)  Method: Weight Method: Bed scale  BMI: BMI (Calculated): 27.5  BMI classification: Obese Class II: 35-39.9kg/m2  IBW:      UBW: fluctuates 2/2 CKD ~180-200 lb since 2022   Weight       Weight (kg) Weight (lbs) Weight Method Visit Report   8/9/2023 88.905 kg  196 lb  Bed scale     1/15/2024 93.441 kg  206 lb  Estimated     1/16/2024 90.855 kg  200 lb 4.8 oz  Bed scale      90 kg  198 lb 6.6 oz      1/17/2024 95.21 kg  209 lb 14.4 oz      1/18/2024 85.548 kg  188 lb 9.6 oz  Bed scale     1/19/2024 82.691 kg  182 lb 4.8 oz  Bed scale     1/20/2024 83.054 kg  183 lb 1.6 oz  Bed " "scale     2024 85.14 kg  187 lb 11.2 oz  Bed scale     2024 85.1 kg  187 lb 9.8 oz  Estimated     2024 84.823 kg  187 lb   --    3/10/2024 84.823 kg  187 lb  Stated      98.1 kg  216 lb 4.3 oz  Bed scale     3/11/2024 90 kg  198 lb 6.6 oz  Bed scale       Weight change: no significant changes - suspect currently holding fluid    Nutrition Focused Physical Exam     Date: 3/11, 3/22    Unable to perform exam due to: Pt unable to participate at time of visit, Defer pending indication    Needs Assessment   Date: 3/11, reassessed 3/19, 3/22, 3/25    Height used:Height: 182.9 cm (72\")  Weights used: 85 kg / 187 lb (ABW), 53 kg / 117 lb (IBW)  *suspected dry weight likely ~185 lb given previously documented including recent MDOV weight. Likely currently holding fluid, will continue to monitor.    Estimated Calorie needs: ~1850 Kcal/day   Method:  Kcals/KG 15-20 = 5423-5272  Method:   IC = 1850 kcal, RQ=0.73      Estimated Protein needs: ~102 g PRO/day  Method: 1.2 g/Kg ABW = 102    Estimated Fluid needs: ~ ml/day   Per clinical status    Current Nutrition Prescription     PO: No diet orders on file  Oral Nutrition Supplement:   Intake: N/A    EN: NovaSource Renal  Goal Rate: 47 ml/hr   Water Flushes: 30ml ev 2 hr  Modular: Prosource-no carb 2/day  Route: PEG  Tube: Large bore    At goal over: 20Hrs/day in ICU    Rx will supply:   Goal Volume 940 mL/day       Flush Volume 300 mL/day       Energy 2000 Kcal/day 108 % Est Need   Protein 115 g/day 106 % Est Need   Fiber 0 g/day       Water in   mL       Total Water 977 mL       Meet DRI No           --------------------------------------------------------------------------  Product/Rate verified at bedside: No   Infusing Rate at time of visit:  pt discharged    Average Delivery from [facility] Days: includes day when PEG placed  Volume 779.5 mL/day 83  % Goal Vol.   Flush Volume 441.5 mL/day     Energy 1679 Kcal/day 91 % Est Need   Protein 101 g/day 78 % " Est Need   Fiber 0 g/day     Water in   mL     Total Water 1003 mL     Meet DRI No          Nutrition Diagnosis   Date: 3/11  Updated: 3/14  Problem Inadequate oral intake   Etiology ARF requiring mechanical ventilation   Signs/Symptoms Intubated/sedated, NPO   Status: Extubated, Receiving EN    Date: 3/11  Updated: 3/14, 3/31, 4/4  Problem Altered nutrition related laboratory values   Etiology A/C Renal Failure   Signs/Symptoms Creatinine 2.02, BUN 57, K 6.0   Status: Creatinine K+ WNL, BUN 59    Goal:   General: Nutrition to support treatment  PO: Advance diet as medically feasible/appropriate  EN/PN: Maintain EN at this time follow for delivery trend and condition / lytes    Nutrition Intervention      Follow treatment progress, Care plan reviewed, rec for possible use std feeding prepared.    Note watch condition / lytes. Pt may tolerate non specialty formula out of ICU delivery over 22 hr:  Isosource 1.5 goal 50 ml/hr Water at 40 ml/hr 2 Prosource/da for 1100 ml to supply   1770 Kcal 96% est need, 105 g % est need, 16.7 g Fiber, 836 ml Water in EN 1716 total ml Free Water.     Monitoring/Evaluation:   Per protocol, I&O, Pertinent labs, EN delivery/tolerance, Weight, GI status, Symptoms, POC/GOC, Swallow function      Melissa Serrano RD,   Time Spent: 30 min

## 2024-04-08 ENCOUNTER — TELEPHONE (OUTPATIENT)
Dept: CARDIOLOGY | Facility: CLINIC | Age: 69
End: 2024-04-08
Payer: MEDICARE

## 2024-04-08 NOTE — TELEPHONE ENCOUNTER
04/08/2024 AT 2:38 PM  SPOKE WITH PT REGARDING UPCOMING APPOINTMENT LOCATION TO BE IN THE [facility] LOCATION.

## 2024-05-28 ENCOUNTER — OFFICE VISIT (OUTPATIENT)
Dept: NEUROLOGY | Facility: CLINIC | Age: 69
End: 2024-05-28
Payer: MEDICARE

## 2024-05-28 VITALS
BODY MASS INDEX: 23.7 KG/M2 | HEIGHT: 72 IN | OXYGEN SATURATION: 95 % | DIASTOLIC BLOOD PRESSURE: 64 MMHG | SYSTOLIC BLOOD PRESSURE: 138 MMHG | HEART RATE: 82 BPM | WEIGHT: 175 LBS

## 2024-05-28 DIAGNOSIS — F03.92 DEMENTIA WITH PSYCHOTIC DISTURBANCE, UNSPECIFIED DEMENTIA SEVERITY, UNSPECIFIED DEMENTIA TYPE: Primary | ICD-10-CM

## 2024-05-28 RX ORDER — PEN NEEDLE, DIABETIC, SAFETY 30 GX3/16"
NEEDLE, DISPOSABLE MISCELLANEOUS
COMMUNITY
Start: 2024-05-08

## 2024-05-28 RX ORDER — ESOMEPRAZOLE MAGNESIUM 40 MG/1
GRANULE, DELAYED RELEASE ORAL
COMMUNITY
Start: 2024-05-21

## 2024-05-28 RX ORDER — ACETAMINOPHEN 160 MG
TABLET,DISINTEGRATING ORAL
COMMUNITY

## 2024-05-28 RX ORDER — FLUOXETINE 20 MG/5ML
SOLUTION ORAL
COMMUNITY
Start: 2024-05-16

## 2024-05-28 NOTE — PROGRESS NOTES
Subjective:    CC: Omkar Nava is seen today in consultation at the request of hospital for memory problems     HPI:  68-year-old -American male resident of [facility] assisted living facility accompanied by his daughter with a history of hypertension, diabetes mellitus type 2, CKD, recurrent aspiration pneumonia status post PEG tube, osteomyelitis status post left toe amputation, visual impairment, mood disorder presents with memory problems.  As per daughter patient started to have memory problems about 2-1/2 years ago.  He previously moved from house to house (living with different women) but could carry out his ADLs.  Was also working up until 3 years ago.  After that he moved in with his sister and subsequently into a nursing home.  Also started to have hallucinations at the time.  Was abusing drugs including crack cocaine till he moved into a nursing home.  In the past 5 months he has been to the hospital several times.  Was initially admitted in January for pneumonia then in February for altered mental status with a tongue bite.  CT chest showed volume overload.  Had a CT head at that time that was unremarkable as well as an EEG that showed mild slowing but no epileptiform activity.  Was started on Abilify for his mood in addition to the Depakote that he had been previously taking also for his mood.  Denies having any recent history of seizures (may have had seizures as a child).  He was admitted a third time in March for aspiration pneumonia (cultures positive for ESBL pneumonia).  Had to be intubated and also underwent PEG tube placement.  Had another EEG that showed triphasic waves but no epileptiform activity.  Is back to [facility] now.  Since his recent hospitalizations he has been extremely weak and unable to walk.  Prior to that he was using a walker.  He states that he is sleeping poorly at night.  Also continues to have occasional visual hallucinations where he sees either people or  objects such as motorcycles.  Has been legally blind in both eyes for about 2 years now despite having multiple eye surgeries.  His last A1c was 7, B12 was 507 and GFR was above 80  Of note-I personally reviewed his CT head and his extensive hospital notes    The following portions of the patient's history were reviewed today and updated as of 05/28/2024  : allergies, current medications, past family history, past medical history, past social history, past surgical history, and problem list  These document will be scanned to patient's chart.      Current Outpatient Medications:     acetaminophen (TYLENOL) 160 MG/5ML solution, Administer 20.3 mL per G tube Every 6 (Six) Hours As Needed for Mild Pain or Fever., Disp: , Rfl:     albuterol (PROVENTIL) (2.5 MG/3ML) 0.083% nebulizer solution, Take 2.5 mg by nebulization Every 6 (Six) Hours., Disp: , Rfl:     amLODIPine (NORVASC) 10 MG tablet, Administer 1 tablet per G tube Daily., Disp: , Rfl:     ARIPiprazole (ABILIFY) 5 MG tablet, Administer 1 tablet per G tube Daily., Disp: , Rfl:     BD AutoShield Duo 30G X 5 MM misc, , Disp: , Rfl:     brimonidine (ALPHAGAN) 0.2 % ophthalmic solution, Administer 1 drop to both eyes 3 (Three) Times a Day., Disp: , Rfl:     carvedilol (COREG) 12.5 MG tablet, Administer 1 tablet per G tube Every 12 (Twelve) Hours., Disp: , Rfl:     Cholecalciferol (Vitamin D3) 50 MCG (2000 UT) capsule, VITAMIN D3 50 MCG (2000 UT) CAPS, Disp: , Rfl:     cloNIDine (CATAPRES) 0.2 MG tablet, Administer 1 tablet per G tube Every 8 (Eight) Hours., Disp: , Rfl:     FLUoxetine (PROzac) 20 MG/5ML solution, , Disp: , Rfl:     hydrALAZINE (APRESOLINE) 100 MG tablet, Administer 1 tablet per G tube Every 8 (Eight) Hours., Disp: , Rfl:     hydrOXYzine (ATARAX) 25 MG tablet, Administer 1 tablet per G tube 3 (Three) Times a Day As Needed for Anxiety, Allergies or Itching., Disp: , Rfl:     insulin detemir (LEVEMIR) 100 UNIT/ML injection, Inject 6 Units under the skin  into the appropriate area as directed Every 12 (Twelve) Hours., Disp: , Rfl:     insulin regular (humuLIN R,novoLIN R) 100 UNIT/ML injection, Inject 2-9 Units under the skin into the appropriate area as directed Every 6 (Six) Hours., Disp: , Rfl:     insulin regular (humuLIN R,novoLIN R) 100 UNIT/ML injection, Inject 3 Units under the skin into the appropriate area as directed Every 6 (Six) Hours., Disp: , Rfl:     ipratropium-albuterol (DUO-NEB) 0.5-2.5 mg/3 ml nebulizer, Take 3 mL by nebulization Every 4 (Four) Hours As Needed for Shortness of Air or Wheezing., Disp: , Rfl:     lansoprazole (PREVACID SOLUTAB) 30 MG Tablet Delayed Release Dispersible disintegrating tablet, 1 tablet by Nasogastric route Every Morning., Disp: , Rfl:     nexIUM 40 MG packet, , Disp: , Rfl:     Nutritional Supplements (ProSource No Carb) liquid, Administer 30 mL per G tube 2 (Two) Times a Day., Disp: , Rfl:     PALLIATIVE CARE ORAL RINSE, [facility],, Apply 5 mL to the mouth or throat 4 (Four) Times a Day., Disp: , Rfl:     sennosides (SENOKOT) 8.8 MG/5ML syrup, Administer 10 mL per G tube At Night As Needed for Constipation., Disp: , Rfl:     terazosin (HYTRIN) 5 MG capsule, Administer 1 capsule per G tube Every 12 (Twelve) Hours., Disp: , Rfl:     timolol (TIMOPTIC) 0.5 % ophthalmic solution, Administer 1 drop to both eyes 2 (Two) Times a Day., Disp: , Rfl:     torsemide (DEMADEX) 5 MG tablet, Administer 1 tablet per G tube Daily., Disp: , Rfl:     Valproic Acid (DEPAKENE) 250 MG/5ML solution syrup, 3 mL by Nasogastric route Every 8 (Eight) Hours., Disp: , Rfl:    Past Medical History:   Diagnosis Date    Anemia     Dementia     Diabetes mellitus     Dysphagia     GERD (gastroesophageal reflux disease)     History of alcohol abuse     History of cocaine use     History of marijuana use     Hypertension     Osteomyelitis     Poor historian     records obtained from nursing home records & his family    Visual impairment       Past Surgical  "History:   Procedure Laterality Date    AMPUTATION FOOT Left 10/18/2022    Procedure: PARTIAL FIRST RAY AMPUTATION LEFT;  Surgeon: Yeison Petty MD;  Location:  [facility];  Service: Orthopedics;  Laterality: Left;    AMPUTATION FOOT Left 2022    Procedure: Transmetatarsal of Left Foot;  Surgeon: Yeison Petty MD;  Location:  [facility];  Service: Orthopedics;  Laterality: Left;    ENDOSCOPY N/A 3/14/2024    Procedure: ESOPHAGOGASTRODUODENOSCOPY WITH JEJUNAL TUBE INSERTION AT BEDSIDE;  Surgeon: Brunner, Mark I, MD;  Location:  [facility];  Service: Gastroenterology;  Laterality: N/A;    ENDOSCOPY W/ PEG TUBE PLACEMENT N/A 2024    Procedure: ESOPHAGOGASTRODUODENOSCOPY WITH PERCUTANEOUS ENDOSCOPIC GASTROSTOMY TUBE INSERTION;  Surgeon: Brunner, Mark I, MD;  Location:  [facility];  Service: Gastroenterology;  Laterality: N/A;  EGD with PEG placement.  Secured at 3.5 cm    EYE SURGERY        Family History   Problem Relation Age of Onset    Diabetes Mother     Hypertension Mother     Hypertension Father     Diabetes Father     Diabetes Sister     Hypertension Brother     Diabetes Brother     Diabetes Maternal Grandmother     Diabetes Maternal Grandfather     Hypertension Brother     Diabetes Brother       Social History     Socioeconomic History    Marital status: Single   Tobacco Use    Smoking status: Former     Current packs/day: 0.00     Average packs/day: 1 pack/day for 40.0 years (40.0 ttl pk-yrs)     Types: Cigarettes     Start date: 1977     Quit date: 2017     Years since quittin.0    Smokeless tobacco: Never   Vaping Use    Vaping status: Never Used   Substance and Sexual Activity    Alcohol use: Not Currently     Comment: histgry of alcohol abuse    Drug use: Yes     Types: Marijuana, \"Crack\" cocaine     Comment: PATIENT STATES \"IT'S BEEN A FEW DAYS\"    Sexual activity: Defer     Review of Systems   Eyes:  Positive for visual disturbance.   Musculoskeletal:  Positive for gait " "problem.   Neurological:  Positive for tremors and memory problem.   All other systems reviewed and are negative.    Objective:    /64   Pulse 82   Ht 182.9 cm (72\")   Wt 79.4 kg (175 lb)   SpO2 95%   BMI 23.73 kg/m²     Neurology Exam:    General apperance: NAD.     Mental status: Alert, awake and oriented to  person.    Recent and Remote memory: Significantly impaired.  MMSE of 4/24 with a delayed recall of 0/3 (could not carry out a few questions due to visual impairment)    Attention span and Concentration: Normal.     Language and Speech: Intact- No dysarthria.    Fluency, Naming , Repitition and Comprehension:  Intact    Cranial Nerves:   CN II: Visual fields-completely blind in right eye.  Could count fingers up close in the left eye.  Pupils - TRUNG  CN III, IV and VI: Extraocular movements are intact. Normal saccades.   CN V: Facial sensation is intact.   CN VII: Muscles of facial expression reveal no asymmetry. Intact.   CN VIII: Hearing is intact. Whispered voice intact.   CN IX and X: Palate elevates symmetrically. Intact  CN XI: Shoulder shrug is intact.   CN XII: Tongue is midline without evidence of atrophy or fasciculation.     Ophthalmoscopic exam of optic disc-deferred    Motor: Bilateral postural tremors noted    Right UE muscle strength 5/5. Normal tone.     Left UE muscle strength 5/5.  Mildly increased tone     Right LE muscle strength5/5. Normal tone.     Left LE muscle strength 5/5. Normal tone.      Sensory: Normal light touch, vibration and pinprick sensation bilaterally.    DTRs: 2+ bilaterally in upper and lower extremities.    Babinski: Negative bilaterally.    Co-ordination: Normal finger-to-nose, heel to shin B/L.    Rhomberg: Deferred    Gait: Could get up and take a few steps with two-person assist but kept leaning backwards    Cardiovascular: Regular rate and rhythm without murmur, gallop or rub.    Assessment and Plan:  1. Dementia with psychotic disturbance, unspecified " dementia severity, unspecified dementia type  I will start him on donepezil 5 mg daily  We will also start Seroquel gradually increasing to 25 mg nightly for his sleep disturbances/visual  hallucinations which could be due to his vision loss  For now he can continue Depakote for his mood 150 mg every 8 hours through PEG tube  I have told him to restart PT/OT    Return in about 6 months (around 11/28/2024).     I spent over 40 minutes with the patient face to face out of which over 50% (30 minutes) was spent in management, instructions and education.     Farzana Easley MD

## 2024-05-30 ENCOUNTER — OFFICE VISIT (OUTPATIENT)
Dept: CARDIOLOGY | Facility: CLINIC | Age: 69
End: 2024-05-30
Payer: MEDICARE

## 2024-05-30 VITALS
OXYGEN SATURATION: 99 % | SYSTOLIC BLOOD PRESSURE: 154 MMHG | HEIGHT: 72 IN | DIASTOLIC BLOOD PRESSURE: 84 MMHG | BODY MASS INDEX: 23.7 KG/M2 | HEART RATE: 52 BPM | WEIGHT: 175 LBS

## 2024-05-30 DIAGNOSIS — I50.32 CHRONIC HEART FAILURE WITH PRESERVED EJECTION FRACTION (HFPEF): Primary | ICD-10-CM

## 2024-05-30 RX ORDER — QUETIAPINE FUMARATE 25 MG/1
25 TABLET, FILM COATED ORAL NIGHTLY
COMMUNITY
Start: 2024-05-28

## 2024-05-30 RX ORDER — DONEPEZIL HYDROCHLORIDE 5 MG/1
5 TABLET, FILM COATED ORAL NIGHTLY
COMMUNITY
Start: 2024-05-28

## 2024-05-30 NOTE — PROGRESS NOTES
"Chief Complaint  Acute heart failure with preserved ejection fraction (HFpEF)      Subjective   History of Present Illness    Problem List  -HFpEF  -Dementia/psychosis, lives at [facility].  severe  -Seizure?  -recurrent pneumonia  -elevated hs tropononin approx 150  -pro bnp at baseline approx 900  -EKG non specific changes    Mr. Nava is a 68 year old with above hx.  He is here for follow up following hospital stay for psychosis, pneumonia and CHF.  No complaints but he is psychotic and severely demented.  Edema 1+.  ROS negative except for the above.      Update 5/30/24  No complaints and edema has resolved               .       Objective   Vital Signs:  Vitals:    05/30/24 1042   BP: 154/84   Pulse: 52   SpO2: 99%     Estimated body mass index is 23.73 kg/m² as calculated from the following:    Height as of this encounter: 182.9 cm (72\").    Weight as of this encounter: 79.4 kg (175 lb).       Physical Exam  HENT:      Head: Normocephalic.   Cardiovascular:      Rate and Rhythm: Normal rate and regular rhythm.      Heart sounds: No murmur heard.     No gallop.   Pulmonary:      Breath sounds: Normal breath sounds.   Abdominal:      Palpations: Abdomen is soft.   Musculoskeletal:      Right lower leg: No edema.      Left lower leg: No edema.   Skin:     General: Skin is warm and dry.   Neurological:      General: No focal deficit present.   Psychiatric:         Mood and Affect: Affect is labile, blunt and inappropriate.     Clinic discussed advanced directive          Assessment   -HFpEF  -Dementia/psychosis, lives at [facility].  severe  -Seizure?  -recurrent pneumonia  -elevated hs tropononin approx 150  -pro bnp at baseline approx 900  -EKG non specific changes    Plan   -aspirin held secondary to anemia  -cont. Statin  -cont. Diuretic therapy and HTN meds  -continue conservative thearpy  -appears to be likely at baseline  -blood work at next appoitemnt    Return in about 6 months (around " 11/30/2024).  Tommy Mcpherson MD  02/22/2024 13:46 EST

## 2024-06-06 ENCOUNTER — HOSPITAL ENCOUNTER (EMERGENCY)
Facility: HOSPITAL | Age: 69
Discharge: NURSING, REHAB OR LONG-TERM CARE | End: 2024-06-07
Attending: EMERGENCY MEDICINE | Admitting: EMERGENCY MEDICINE
Payer: MEDICARE

## 2024-06-06 DIAGNOSIS — F03.918 DEMENTIA WITH BEHAVIORAL DISTURBANCE: Primary | ICD-10-CM

## 2024-06-06 DIAGNOSIS — Z79.899 POLYPHARMACY: ICD-10-CM

## 2024-06-06 LAB
BILIRUB UR QL STRIP: NEGATIVE
CLARITY UR: CLEAR
COLOR UR: YELLOW
GLUCOSE UR STRIP-MCNC: NEGATIVE MG/DL
HGB UR QL STRIP.AUTO: NEGATIVE
KETONES UR QL STRIP: NEGATIVE
LEUKOCYTE ESTERASE UR QL STRIP.AUTO: NEGATIVE
NITRITE UR QL STRIP: NEGATIVE
PH UR STRIP.AUTO: 5.5 [PH] (ref 5–8)
PROT UR QL STRIP: NEGATIVE
SP GR UR STRIP: 1.02 (ref 1–1.03)
UROBILINOGEN UR QL STRIP: NORMAL

## 2024-06-06 PROCEDURE — 99283 EMERGENCY DEPT VISIT LOW MDM: CPT

## 2024-06-06 PROCEDURE — 81003 URINALYSIS AUTO W/O SCOPE: CPT | Performed by: EMERGENCY MEDICINE

## 2024-06-07 VITALS
WEIGHT: 175 LBS | RESPIRATION RATE: 16 BRPM | OXYGEN SATURATION: 96 % | SYSTOLIC BLOOD PRESSURE: 117 MMHG | HEART RATE: 79 BPM | TEMPERATURE: 98.1 F | HEIGHT: 72 IN | DIASTOLIC BLOOD PRESSURE: 68 MMHG | BODY MASS INDEX: 23.7 KG/M2

## 2024-06-07 NOTE — DISCHARGE INSTRUCTIONS
Stop taking prescribed medication donepezil.  This may be in part a reason for patient's behavioral changes since that medicine was started.  Follow-up with his primary doctor and/or neurologist.  Return to the ER as needed for new or worsening symptoms

## 2024-06-07 NOTE — ED PROVIDER NOTES
Subjective   History of Present Illness  Patient presents for evaluation of an episode of agitation/aggressive behavior earlier tonight.  Patient has a known history of dementia with psychotic features and behavioral disturbance.  Daughter who is spoken with over the phone provides history that he has had progressive worsening of his symptoms long-term but also it seems to be worse over the past 1 week and she noticed these changes after he started new medications donepezil and trazodone by his neurologist.    Patient himself has no complaints.  He is calm, cooperative, is willing to speak with me though is not particularly loquacious.     When discussing the plan of care with the patient and the daughter and patient's goals of care we will not obtain any blood work.  We will obtain urine to evaluate for UTI.  Will evaluate his medications for polypharmacy or medication interactions that could cause these behaviors.  They request no further workup be pursued including blood work or imaging at this time.    History provided by:  Patient, EMS personnel and relative      Review of Systems    Past Medical History:   Diagnosis Date    Anemia     Dementia     Diabetes mellitus     Dysphagia     GERD (gastroesophageal reflux disease)     History of alcohol abuse     History of cocaine use     History of marijuana use     Hypertension     Osteomyelitis     Poor historian     records obtained from nursing home records & his family    Visual impairment        No Known Allergies    Past Surgical History:   Procedure Laterality Date    AMPUTATION FOOT Left 10/18/2022    Procedure: PARTIAL FIRST RAY AMPUTATION LEFT;  Surgeon: Yeison Petty MD;  Location: [facility];  Service: Orthopedics;  Laterality: Left;    AMPUTATION FOOT Left 12/5/2022    Procedure: Transmetatarsal of Left Foot;  Surgeon: Yeison Petty MD;  Location: [facility] OR;  Service: Orthopedics;  Laterality: Left;    ENDOSCOPY N/A 3/14/2024    Procedure:  "ESOPHAGOGASTRODUODENOSCOPY WITH JEJUNAL TUBE INSERTION AT BEDSIDE;  Surgeon: Brunner, Mark I, MD;  Location:  [facility];  Service: Gastroenterology;  Laterality: N/A;    ENDOSCOPY W/ PEG TUBE PLACEMENT N/A 2024    Procedure: ESOPHAGOGASTRODUODENOSCOPY WITH PERCUTANEOUS ENDOSCOPIC GASTROSTOMY TUBE INSERTION;  Surgeon: Brunner, Mark I, MD;  Location:  [facility];  Service: Gastroenterology;  Laterality: N/A;  EGD with PEG placement.  Secured at 3.5 cm    EYE SURGERY         Family History   Problem Relation Age of Onset    Diabetes Mother     Hypertension Mother     Hypertension Father     Diabetes Father     Diabetes Sister     Hypertension Brother     Diabetes Brother     Diabetes Maternal Grandmother     Diabetes Maternal Grandfather     Hypertension Brother     Diabetes Brother        Social History     Socioeconomic History    Marital status: Single   Tobacco Use    Smoking status: Former     Current packs/day: 0.00     Average packs/day: 1 pack/day for 40.0 years (40.0 ttl pk-yrs)     Types: Cigarettes     Start date: 1977     Quit date: 2017     Years since quittin.0     Passive exposure: Past    Smokeless tobacco: Never   Vaping Use    Vaping status: Never Used   Substance and Sexual Activity    Alcohol use: Not Currently     Comment: histgry of alcohol abuse    Drug use: Yes     Types: Marijuana, \"Crack\" cocaine     Comment: PATIENT STATES \"IT'S BEEN A FEW DAYS\"    Sexual activity: Defer           Objective   Physical Exam  Constitutional:       General: He is not in acute distress.  HENT:      Head: Normocephalic and atraumatic.   Eyes:      Conjunctiva/sclera: Conjunctivae normal.      Pupils: Pupils are equal, round, and reactive to light.   Cardiovascular:      Rate and Rhythm: Normal rate and regular rhythm.      Pulses: Normal pulses.      Heart sounds: No murmur heard.     No gallop.   Pulmonary:      Effort: Pulmonary effort is normal. No respiratory distress.   Abdominal: "      General: Abdomen is flat. There is no distension.      Tenderness: There is no abdominal tenderness.   Musculoskeletal:         General: No swelling or deformity. Normal range of motion.   Skin:     General: Skin is warm and dry.      Capillary Refill: Capillary refill takes less than 2 seconds.   Neurological:      General: No focal deficit present.      Mental Status: He is alert.      Comments: GCS 14 which is patient's reported baseline.  Moves all extremities equally.   Psychiatric:         Mood and Affect: Mood normal.         Behavior: Behavior normal.         Procedures           ED Course  ED Course as of 06/07/24 0334   Fri Jun 07, 2024   0333 Urine studies independently interpreted by myself demonstrates no significant abnormality [KB]      ED Course User Index  [KB] Blue Reina MD                                             Medical Decision Making  Broad differential diagnosis including progression of patient's known dementia with behavioral disturbance, urinary tract infection or other infectious pathology, metabolic derangement, polypharmacy or drug drug interaction.  Based on patient and family's goals of care will obtain urine studies and evaluate medication list.    Discussed urine results with patient's daughter over the phone as well as my discussion with pharmacy staff about new medications.  While it is far from a clear diagnosis patient's recent mental state changes could be in part due to medication interactions and so we will discontinue donepezil at this time.  Patient was discharged from the ER in good condition.    Problems Addressed:  Dementia with behavioral disturbance: acute illness or injury  Polypharmacy: acute illness or injury    Amount and/or Complexity of Data Reviewed  Independent Historian: EMS     Details: Prehospital course by EMS  External Data Reviewed: notes.     Details: 4/4/24 reviewed most recent discharge summary where patient was treated for respiratory  failure due to pneumonia.  Also documents chronic medical conditions  Labs: ordered. Decision-making details documented in ED Course.  Discussion of management or test interpretation with external provider(s): Consulted with pharmacist, reviewed patient's medication list, new medications.  It does appear that donepezil can enhance neurotoxic effects of other medications such as Abilify and Seroquel that the patient is taking.    Risk  Prescription drug management.  Decision regarding hospitalization.  Diagnosis or treatment significantly limited by social determinants of health.        Final diagnoses:   Dementia with behavioral disturbance   Polypharmacy       ED Disposition  ED Disposition       ED Disposition   Discharge    Condition   Stable    Comment   --             Recent Results (from the past 24 hour(s))   Urinalysis With Microscopic If Indicated (No Culture) - Urine, Clean Catch    Collection Time: 06/06/24  9:50 PM    Specimen: Urine, Clean Catch   Result Value Ref Range    Color, UA Yellow Yellow, Straw    Appearance, UA Clear Clear    pH, UA 5.5 5.0 - 8.0    Specific Gravity, UA 1.017 1.001 - 1.030    Glucose, UA Negative Negative    Ketones, UA Negative Negative    Bilirubin, UA Negative Negative    Blood, UA Negative Negative    Protein, UA Negative Negative    Leuk Esterase, UA Negative Negative    Nitrite, UA Negative Negative    Urobilinogen, UA 1.0 E.U./dL 0.2 - 1.0 E.U./dL     Note: In addition to lab results from this visit, the labs listed above may include labs taken at another facility or during a different encounter within the last 24 hours. Please correlate lab times with ED admission and discharge times for further clarification of the services performed during this visit.    No orders to display     Vitals:    06/06/24 2122 06/06/24 2130 06/06/24 2215   BP: 149/69 146/60 117/68   BP Location: Left arm     Patient Position: Sitting     Pulse: 72 75 79   Resp: 16     Temp: 98.1 °F (36.7  "°C)     TempSrc: Oral     SpO2: 100% 97% 96%   Weight: 79.4 kg (175 lb)     Height: 182.9 cm (72\")       Medications - No data to display  ECG/EMG Results (last 24 hours)       ** No results found for the last 24 hours. **          No orders to display           No follow-up provider specified.       Medication List        Stop      donepezil 5 MG tablet  Commonly known as: Blue Burgess MD  06/07/24 0334    "

## 2024-09-11 ENCOUNTER — HOSPITAL ENCOUNTER (INPATIENT)
Facility: HOSPITAL | Age: 69
LOS: 7 days | Discharge: NURSING, REHAB OR LONG-TERM CARE | End: 2024-09-18
Attending: EMERGENCY MEDICINE | Admitting: HOSPITALIST
Payer: MEDICARE

## 2024-09-11 ENCOUNTER — APPOINTMENT (OUTPATIENT)
Dept: CT IMAGING | Facility: HOSPITAL | Age: 69
End: 2024-09-11
Payer: MEDICARE

## 2024-09-11 DIAGNOSIS — R13.12 OROPHARYNGEAL DYSPHAGIA: ICD-10-CM

## 2024-09-11 DIAGNOSIS — E87.5 HYPERKALEMIA: ICD-10-CM

## 2024-09-11 DIAGNOSIS — F02.B2 MODERATE ALZHEIMER'S DEMENTIA WITH PSYCHOTIC DISTURBANCE, UNSPECIFIED TIMING OF DEMENTIA ONSET: ICD-10-CM

## 2024-09-11 DIAGNOSIS — D64.9 ACUTE ANEMIA: ICD-10-CM

## 2024-09-11 DIAGNOSIS — M62.81 GENERALIZED MUSCLE WEAKNESS: ICD-10-CM

## 2024-09-11 DIAGNOSIS — U07.1 COVID-19: ICD-10-CM

## 2024-09-11 DIAGNOSIS — G30.9 MODERATE ALZHEIMER'S DEMENTIA WITH PSYCHOTIC DISTURBANCE, UNSPECIFIED TIMING OF DEMENTIA ONSET: ICD-10-CM

## 2024-09-11 DIAGNOSIS — J18.9 MULTIFOCAL PNEUMONIA: Primary | ICD-10-CM

## 2024-09-11 LAB
ABO GROUP BLD: NORMAL
ALBUMIN SERPL-MCNC: 3.5 G/DL (ref 3.5–5.2)
ALBUMIN/GLOB SERPL: 1.1 G/DL
ALP SERPL-CCNC: 93 U/L (ref 39–117)
ALT SERPL W P-5'-P-CCNC: 85 U/L (ref 1–41)
ANION GAP SERPL CALCULATED.3IONS-SCNC: 9 MMOL/L (ref 5–15)
ARTERIAL PATENCY WRIST A: ABNORMAL
AST SERPL-CCNC: 37 U/L (ref 1–40)
ATMOSPHERIC PRESS: ABNORMAL MM[HG]
B PARAPERT DNA SPEC QL NAA+PROBE: NOT DETECTED
B PERT DNA SPEC QL NAA+PROBE: NOT DETECTED
BACTERIA UR QL AUTO: ABNORMAL /HPF
BASE EXCESS BLDA CALC-SCNC: -1 MMOL/L (ref 0–2)
BASOPHILS # BLD AUTO: 0.02 10*3/MM3 (ref 0–0.2)
BASOPHILS NFR BLD AUTO: 0.3 % (ref 0–1.5)
BDY SITE: ABNORMAL
BILIRUB SERPL-MCNC: <0.2 MG/DL (ref 0–1.2)
BILIRUB UR QL STRIP: NEGATIVE
BLD GP AB SCN SERPL QL: NEGATIVE
BODY TEMPERATURE: 37
BUN SERPL-MCNC: 40 MG/DL (ref 8–23)
BUN/CREAT SERPL: 24.7 (ref 7–25)
C PNEUM DNA NPH QL NAA+NON-PROBE: NOT DETECTED
CALCIUM SPEC-SCNC: 8.7 MG/DL (ref 8.6–10.5)
CHLORIDE SERPL-SCNC: 101 MMOL/L (ref 98–107)
CLARITY UR: CLEAR
CO2 BLDA-SCNC: 25.6 MMOL/L (ref 22–33)
CO2 SERPL-SCNC: 22 MMOL/L (ref 22–29)
COHGB MFR BLD: 1.3 % (ref 0–2)
COLOR UR: YELLOW
CREAT SERPL-MCNC: 1.62 MG/DL (ref 0.76–1.27)
CRP SERPL-MCNC: 1.64 MG/DL (ref 0–0.5)
D-LACTATE SERPL-SCNC: 0.5 MMOL/L (ref 0.5–2)
DEPRECATED RDW RBC AUTO: 50 FL (ref 37–54)
DEVELOPER EXPIRATION DATE: NORMAL
DEVELOPER LOT NUMBER: NORMAL
EGFRCR SERPLBLD CKD-EPI 2021: 46.2 ML/MIN/1.73
EOSINOPHIL # BLD AUTO: 0.04 10*3/MM3 (ref 0–0.4)
EOSINOPHIL NFR BLD AUTO: 0.6 % (ref 0.3–6.2)
EPAP: 0
ERYTHROCYTE [DISTWIDTH] IN BLOOD BY AUTOMATED COUNT: 13.6 % (ref 12.3–15.4)
ERYTHROCYTE [SEDIMENTATION RATE] IN BLOOD: 27 MM/HR (ref 0–20)
EXPIRATION DATE: NORMAL
FECAL OCCULT BLOOD SCREEN, POC: NEGATIVE
FLUAV SUBTYP SPEC NAA+PROBE: NOT DETECTED
FLUBV RNA ISLT QL NAA+PROBE: NOT DETECTED
GLOBULIN UR ELPH-MCNC: 3.3 GM/DL
GLUCOSE SERPL-MCNC: 87 MG/DL (ref 65–99)
GLUCOSE UR STRIP-MCNC: NEGATIVE MG/DL
HADV DNA SPEC NAA+PROBE: NOT DETECTED
HCO3 BLDA-SCNC: 24.3 MMOL/L (ref 20–26)
HCOV 229E RNA SPEC QL NAA+PROBE: NOT DETECTED
HCOV HKU1 RNA SPEC QL NAA+PROBE: NOT DETECTED
HCOV NL63 RNA SPEC QL NAA+PROBE: NOT DETECTED
HCOV OC43 RNA SPEC QL NAA+PROBE: NOT DETECTED
HCT VFR BLD AUTO: 22.4 % (ref 37.5–51)
HCT VFR BLD CALC: 22.2 % (ref 38–51)
HGB BLD-MCNC: 6.9 G/DL (ref 13–17.7)
HGB BLDA-MCNC: 7.2 G/DL (ref 13.5–17.5)
HGB UR QL STRIP.AUTO: NEGATIVE
HMPV RNA NPH QL NAA+NON-PROBE: NOT DETECTED
HOLD SPECIMEN: NORMAL
HPIV1 RNA ISLT QL NAA+PROBE: NOT DETECTED
HPIV2 RNA SPEC QL NAA+PROBE: NOT DETECTED
HPIV3 RNA NPH QL NAA+PROBE: NOT DETECTED
HPIV4 P GENE NPH QL NAA+PROBE: NOT DETECTED
HYALINE CASTS UR QL AUTO: ABNORMAL /LPF
IMM GRANULOCYTES # BLD AUTO: 0.03 10*3/MM3 (ref 0–0.05)
IMM GRANULOCYTES NFR BLD AUTO: 0.5 % (ref 0–0.5)
INHALED O2 CONCENTRATION: 21 %
IPAP: 0
KETONES UR QL STRIP: NEGATIVE
LEUKOCYTE ESTERASE UR QL STRIP.AUTO: ABNORMAL
LYMPHOCYTES # BLD AUTO: 0.92 10*3/MM3 (ref 0.7–3.1)
LYMPHOCYTES NFR BLD AUTO: 14.4 % (ref 19.6–45.3)
Lab: NORMAL
M PNEUMO IGG SER IA-ACNC: NOT DETECTED
MAGNESIUM SERPL-MCNC: 2.2 MG/DL (ref 1.6–2.4)
MCH RBC QN AUTO: 31.1 PG (ref 26.6–33)
MCHC RBC AUTO-ENTMCNC: 30.8 G/DL (ref 31.5–35.7)
MCV RBC AUTO: 100.9 FL (ref 79–97)
METHGB BLD QL: 0.4 % (ref 0–1.5)
MODALITY: ABNORMAL
MONOCYTES # BLD AUTO: 0.94 10*3/MM3 (ref 0.1–0.9)
MONOCYTES NFR BLD AUTO: 14.8 % (ref 5–12)
NEGATIVE CONTROL: NEGATIVE
NEUTROPHILS NFR BLD AUTO: 4.42 10*3/MM3 (ref 1.7–7)
NEUTROPHILS NFR BLD AUTO: 69.4 % (ref 42.7–76)
NITRITE UR QL STRIP: NEGATIVE
NRBC BLD AUTO-RTO: 0 /100 WBC (ref 0–0.2)
OXYHGB MFR BLDV: 97.9 % (ref 94–99)
PAW @ PEAK INSP FLOW SETTING VENT: 0 CMH2O
PCO2 BLDA: 42.2 MM HG (ref 35–45)
PCO2 TEMP ADJ BLD: 42.2 MM HG (ref 35–48)
PH BLDA: 7.37 PH UNITS (ref 7.35–7.45)
PH UR STRIP.AUTO: 7.5 [PH] (ref 5–8)
PH, TEMP CORRECTED: 7.37 PH UNITS
PLATELET # BLD AUTO: 173 10*3/MM3 (ref 140–450)
PMV BLD AUTO: 11.4 FL (ref 6–12)
PO2 BLDA: 131 MM HG (ref 83–108)
PO2 TEMP ADJ BLD: 131 MM HG (ref 83–108)
POSITIVE CONTROL: POSITIVE
POTASSIUM SERPL-SCNC: 6.4 MMOL/L (ref 3.5–5.2)
PROCALCITONIN SERPL-MCNC: 0.11 NG/ML (ref 0–0.25)
PROT SERPL-MCNC: 6.8 G/DL (ref 6–8.5)
PROT UR QL STRIP: ABNORMAL
RBC # BLD AUTO: 2.22 10*6/MM3 (ref 4.14–5.8)
RBC # UR STRIP: ABNORMAL /HPF
REF LAB TEST METHOD: ABNORMAL
RH BLD: POSITIVE
RHINOVIRUS RNA SPEC NAA+PROBE: NOT DETECTED
RSV RNA NPH QL NAA+NON-PROBE: NOT DETECTED
SARS-COV-2 RNA NPH QL NAA+NON-PROBE: DETECTED
SODIUM SERPL-SCNC: 132 MMOL/L (ref 136–145)
SP GR UR STRIP: 1.01 (ref 1–1.03)
SQUAMOUS #/AREA URNS HPF: ABNORMAL /HPF
T&S EXPIRATION DATE: NORMAL
TOTAL RATE: 0 BREATHS/MINUTE
UROBILINOGEN UR QL STRIP: ABNORMAL
VALPROATE SERPL-MCNC: 13.4 MCG/ML (ref 50–125)
WBC # UR STRIP: ABNORMAL /HPF
WBC NRBC COR # BLD AUTO: 6.37 10*3/MM3 (ref 3.4–10.8)
WHOLE BLOOD HOLD COAG: NORMAL
WHOLE BLOOD HOLD SPECIMEN: NORMAL

## 2024-09-11 PROCEDURE — 25010000002 VANCOMYCIN HCL IN NACL 1.75-0.9 GM/500ML-% SOLUTION: Performed by: PHYSICIAN ASSISTANT

## 2024-09-11 PROCEDURE — 82375 ASSAY CARBOXYHB QUANT: CPT

## 2024-09-11 PROCEDURE — 86923 COMPATIBILITY TEST ELECTRIC: CPT

## 2024-09-11 PROCEDURE — 86900 BLOOD TYPING SEROLOGIC ABO: CPT

## 2024-09-11 PROCEDURE — 86140 C-REACTIVE PROTEIN: CPT | Performed by: PHYSICIAN ASSISTANT

## 2024-09-11 PROCEDURE — 84145 PROCALCITONIN (PCT): CPT | Performed by: PHYSICIAN ASSISTANT

## 2024-09-11 PROCEDURE — 81001 URINALYSIS AUTO W/SCOPE: CPT | Performed by: PHYSICIAN ASSISTANT

## 2024-09-11 PROCEDURE — 86901 BLOOD TYPING SEROLOGIC RH(D): CPT | Performed by: PHYSICIAN ASSISTANT

## 2024-09-11 PROCEDURE — 99291 CRITICAL CARE FIRST HOUR: CPT

## 2024-09-11 PROCEDURE — 25010000002 CALCIUM GLUCONATE-NACL 1-0.675 GM/50ML-% SOLUTION: Performed by: PHYSICIAN ASSISTANT

## 2024-09-11 PROCEDURE — 80164 ASSAY DIPROPYLACETIC ACD TOT: CPT | Performed by: PHYSICIAN ASSISTANT

## 2024-09-11 PROCEDURE — 85045 AUTOMATED RETICULOCYTE COUNT: CPT | Performed by: STUDENT IN AN ORGANIZED HEALTH CARE EDUCATION/TRAINING PROGRAM

## 2024-09-11 PROCEDURE — 86900 BLOOD TYPING SEROLOGIC ABO: CPT | Performed by: PHYSICIAN ASSISTANT

## 2024-09-11 PROCEDURE — 93005 ELECTROCARDIOGRAM TRACING: CPT | Performed by: PHYSICIAN ASSISTANT

## 2024-09-11 PROCEDURE — 82607 VITAMIN B-12: CPT | Performed by: STUDENT IN AN ORGANIZED HEALTH CARE EDUCATION/TRAINING PROGRAM

## 2024-09-11 PROCEDURE — XW033E5 INTRODUCTION OF REMDESIVIR ANTI-INFECTIVE INTO PERIPHERAL VEIN, PERCUTANEOUS APPROACH, NEW TECHNOLOGY GROUP 5: ICD-10-PCS | Performed by: STUDENT IN AN ORGANIZED HEALTH CARE EDUCATION/TRAINING PROGRAM

## 2024-09-11 PROCEDURE — 85652 RBC SED RATE AUTOMATED: CPT | Performed by: PHYSICIAN ASSISTANT

## 2024-09-11 PROCEDURE — 0202U NFCT DS 22 TRGT SARS-COV-2: CPT | Performed by: PHYSICIAN ASSISTANT

## 2024-09-11 PROCEDURE — 25510000001 IOPAMIDOL 61 % SOLUTION: Performed by: EMERGENCY MEDICINE

## 2024-09-11 PROCEDURE — 83540 ASSAY OF IRON: CPT | Performed by: STUDENT IN AN ORGANIZED HEALTH CARE EDUCATION/TRAINING PROGRAM

## 2024-09-11 PROCEDURE — 36600 WITHDRAWAL OF ARTERIAL BLOOD: CPT

## 2024-09-11 PROCEDURE — 85025 COMPLETE CBC W/AUTO DIFF WBC: CPT | Performed by: PHYSICIAN ASSISTANT

## 2024-09-11 PROCEDURE — 83050 HGB METHEMOGLOBIN QUAN: CPT

## 2024-09-11 PROCEDURE — 82805 BLOOD GASES W/O2 SATURATION: CPT

## 2024-09-11 PROCEDURE — 83735 ASSAY OF MAGNESIUM: CPT | Performed by: PHYSICIAN ASSISTANT

## 2024-09-11 PROCEDURE — 63710000001 INSULIN REGULAR HUMAN PER 5 UNITS: Performed by: PHYSICIAN ASSISTANT

## 2024-09-11 PROCEDURE — 36415 COLL VENOUS BLD VENIPUNCTURE: CPT

## 2024-09-11 PROCEDURE — 80053 COMPREHEN METABOLIC PANEL: CPT | Performed by: PHYSICIAN ASSISTANT

## 2024-09-11 PROCEDURE — 83605 ASSAY OF LACTIC ACID: CPT | Performed by: PHYSICIAN ASSISTANT

## 2024-09-11 PROCEDURE — P9016 RBC LEUKOCYTES REDUCED: HCPCS

## 2024-09-11 PROCEDURE — 87040 BLOOD CULTURE FOR BACTERIA: CPT | Performed by: PHYSICIAN ASSISTANT

## 2024-09-11 PROCEDURE — 70450 CT HEAD/BRAIN W/O DYE: CPT

## 2024-09-11 PROCEDURE — P9612 CATHETERIZE FOR URINE SPEC: HCPCS

## 2024-09-11 PROCEDURE — 82746 ASSAY OF FOLIC ACID SERUM: CPT | Performed by: STUDENT IN AN ORGANIZED HEALTH CARE EDUCATION/TRAINING PROGRAM

## 2024-09-11 PROCEDURE — 82270 OCCULT BLOOD FECES: CPT | Performed by: PHYSICIAN ASSISTANT

## 2024-09-11 PROCEDURE — 82728 ASSAY OF FERRITIN: CPT | Performed by: STUDENT IN AN ORGANIZED HEALTH CARE EDUCATION/TRAINING PROGRAM

## 2024-09-11 PROCEDURE — 25810000003 SODIUM CHLORIDE 0.9 % SOLUTION: Performed by: PHYSICIAN ASSISTANT

## 2024-09-11 PROCEDURE — 71260 CT THORAX DX C+: CPT

## 2024-09-11 PROCEDURE — 84466 ASSAY OF TRANSFERRIN: CPT | Performed by: STUDENT IN AN ORGANIZED HEALTH CARE EDUCATION/TRAINING PROGRAM

## 2024-09-11 PROCEDURE — 25010000002 CEFTRIAXONE PER 250 MG: Performed by: PHYSICIAN ASSISTANT

## 2024-09-11 PROCEDURE — 74177 CT ABD & PELVIS W/CONTRAST: CPT

## 2024-09-11 PROCEDURE — 86850 RBC ANTIBODY SCREEN: CPT | Performed by: PHYSICIAN ASSISTANT

## 2024-09-11 PROCEDURE — 36430 TRANSFUSION BLD/BLD COMPNT: CPT

## 2024-09-11 RX ORDER — DEXAMETHASONE 4 MG/1
6 TABLET ORAL DAILY
Status: DISCONTINUED | OUTPATIENT
Start: 2024-09-12 | End: 2024-09-12

## 2024-09-11 RX ORDER — VANCOMYCIN 1.75 GRAM/500 ML IN 0.9 % SODIUM CHLORIDE INTRAVENOUS
22 ONCE
Status: COMPLETED | OUTPATIENT
Start: 2024-09-11 | End: 2024-09-12

## 2024-09-11 RX ORDER — IPRATROPIUM BROMIDE AND ALBUTEROL SULFATE 2.5; .5 MG/3ML; MG/3ML
3 SOLUTION RESPIRATORY (INHALATION)
Status: DISCONTINUED | OUTPATIENT
Start: 2024-09-12 | End: 2024-09-18 | Stop reason: HOSPADM

## 2024-09-11 RX ORDER — DEXAMETHASONE SODIUM PHOSPHATE 10 MG/ML
6 INJECTION INTRAMUSCULAR; INTRAVENOUS DAILY
Status: DISCONTINUED | OUTPATIENT
Start: 2024-09-12 | End: 2024-09-12

## 2024-09-11 RX ORDER — CALCIUM GLUCONATE 20 MG/ML
1000 INJECTION, SOLUTION INTRAVENOUS ONCE
Status: COMPLETED | OUTPATIENT
Start: 2024-09-11 | End: 2024-09-11

## 2024-09-11 RX ORDER — IOPAMIDOL 612 MG/ML
100 INJECTION, SOLUTION INTRAVASCULAR
Status: COMPLETED | OUTPATIENT
Start: 2024-09-11 | End: 2024-09-11

## 2024-09-11 RX ORDER — DEXTROSE MONOHYDRATE 25 G/50ML
25 INJECTION, SOLUTION INTRAVENOUS ONCE
Status: COMPLETED | OUTPATIENT
Start: 2024-09-11 | End: 2024-09-11

## 2024-09-11 RX ADMIN — IOPAMIDOL 85 ML: 612 INJECTION, SOLUTION INTRAVENOUS at 19:38

## 2024-09-11 RX ADMIN — SODIUM CHLORIDE 1000 ML: 9 INJECTION, SOLUTION INTRAVENOUS at 19:14

## 2024-09-11 RX ADMIN — CALCIUM GLUCONATE 1000 MG: 20 INJECTION, SOLUTION INTRAVENOUS at 19:19

## 2024-09-11 RX ADMIN — INSULIN HUMAN 10 UNITS: 100 INJECTION, SOLUTION PARENTERAL at 19:18

## 2024-09-11 RX ADMIN — Medication 1750 MG: at 23:13

## 2024-09-11 RX ADMIN — DOXYCYCLINE 100 MG: 100 INJECTION, POWDER, LYOPHILIZED, FOR SOLUTION INTRAVENOUS at 21:49

## 2024-09-11 RX ADMIN — DEXTROSE MONOHYDRATE 25 G: 25 INJECTION, SOLUTION INTRAVENOUS at 19:19

## 2024-09-11 RX ADMIN — SODIUM CHLORIDE 2000 MG: 900 INJECTION INTRAVENOUS at 21:49

## 2024-09-11 NOTE — ED PROVIDER NOTES
Subjective  History of Present Illness:    Chief Complaint: Altered mental status, fever, recent COVID diagnosis today  History of Present Illness: 69-year-old male with history of dementia, diabetes, history of alcohol abuse and cocaine abuse, hypertension, previous osteomyelitis, previous history of encephalopathy, he is a nursing home patient was brought in by EMS for report of altered mental status worse than his baseline, decreased activity, fever, and positive for COVID today.  Patient does respond to pain stimuli, his GCS reported is a 13, he typically is able to communicate to simple commands and respond with one-word answers.  He is also reported that he requires assistance for activity, but has been bed ridden over the last day or 2 and decreased activity.  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  Onset: Sudden onset  Duration: 1 day  Exacerbating / Alleviating factors: COVID positive  Associated symptoms: Weakness      Nurses Notes reviewed and agree, including vitals, allergies, social history and prior medical history.     REVIEW OF SYSTEMS: All systems reviewed and not pertinent unless noted.    Review of Systems   Constitutional:  Positive for fatigue.   Neurological:  Positive for weakness.   Psychiatric/Behavioral:  Positive for confusion.    All other systems reviewed and are negative.      Past Medical History:   Diagnosis Date    Anemia     Dementia     Diabetes mellitus     Dysphagia     GERD (gastroesophageal reflux disease)     History of alcohol abuse     History of cocaine use     History of marijuana use     Hypertension     Osteomyelitis     Poor historian     records obtained from nursing home records & his family    Visual impairment        Allergies:    Patient has no known allergies.      Past Surgical History:   Procedure  "Laterality Date    AMPUTATION FOOT Left 10/18/2022    Procedure: PARTIAL FIRST RAY AMPUTATION LEFT;  Surgeon: Yeison Petty MD;  Location:  [facility];  Service: Orthopedics;  Laterality: Left;    AMPUTATION FOOT Left 2022    Procedure: Transmetatarsal of Left Foot;  Surgeon: Yeison Petty MD;  Location:  [facility];  Service: Orthopedics;  Laterality: Left;    ENDOSCOPY N/A 3/14/2024    Procedure: ESOPHAGOGASTRODUODENOSCOPY WITH JEJUNAL TUBE INSERTION AT BEDSIDE;  Surgeon: Brunner, Mark I, MD;  Location:  [facility];  Service: Gastroenterology;  Laterality: N/A;    ENDOSCOPY W/ PEG TUBE PLACEMENT N/A 2024    Procedure: ESOPHAGOGASTRODUODENOSCOPY WITH PERCUTANEOUS ENDOSCOPIC GASTROSTOMY TUBE INSERTION;  Surgeon: Brunner, Mark I, MD;  Location:  [facility];  Service: Gastroenterology;  Laterality: N/A;  EGD with PEG placement.  Secured at 3.5 cm    EYE SURGERY           Social History     Socioeconomic History    Marital status: Single   Tobacco Use    Smoking status: Former     Current packs/day: 0.00     Average packs/day: 1 pack/day for 40.0 years (40.0 ttl pk-yrs)     Types: Cigarettes     Start date: 1977     Quit date: 2017     Years since quittin.3     Passive exposure: Past    Smokeless tobacco: Never   Vaping Use    Vaping status: Never Used   Substance and Sexual Activity    Alcohol use: Not Currently     Comment: histgry of alcohol abuse    Drug use: Yes     Types: Marijuana, \"Crack\" cocaine     Comment: PATIENT STATES \"IT'S BEEN A FEW DAYS\"    Sexual activity: Defer         Family History   Problem Relation Age of Onset    Diabetes Mother     Hypertension Mother     Hypertension Father     Diabetes Father     Diabetes Sister     Hypertension Brother     Diabetes Brother     Diabetes Maternal Grandmother     Diabetes Maternal Grandfather     Hypertension Brother     Diabetes Brother        Objective  Physical Exam:  /71   Pulse 63   Temp 99.6 °F (37.6 °C) (Axillary)   " "Resp 20   Ht 182.9 cm (72\")   Wt 79.4 kg (175 lb 0.7 oz)   SpO2 99%   BMI 23.74 kg/m²      Physical Exam  Vitals and nursing note reviewed.   Constitutional:       Appearance: He is well-developed. He is ill-appearing.   HENT:      Head: Normocephalic and atraumatic.      Mouth/Throat:      Mouth: Mucous membranes are moist.   Eyes:      Extraocular Movements: Extraocular movements intact.   Cardiovascular:      Rate and Rhythm: Normal rate and regular rhythm.   Pulmonary:      Effort: Pulmonary effort is normal.      Breath sounds: Normal breath sounds.   Abdominal:      Palpations: Abdomen is soft.   Musculoskeletal:         General: Normal range of motion.      Cervical back: Normal range of motion.   Skin:     General: Skin is warm and dry.   Neurological:      Mental Status: He is alert.      Motor: Weakness present.   Psychiatric:         Behavior: Behavior normal.         Thought Content: Thought content normal.         Judgment: Judgment normal.           Critical Care    Performed by: Clive Atkins Jr., PA-C  Authorized by: Mary Murray MD    Critical care provider statement:     Critical care time (minutes):  30    Critical care time was exclusive of:  Separately billable procedures and treating other patients and teaching time    Critical care was necessary to treat or prevent imminent or life-threatening deterioration of the following conditions: Acute anemia, requiring packed red blood cells and admission, hyperkalemia requiring calcium gluconate, insulin and glucose, and admission.      ED Course:    EKG interpretation by me, sinus rhythm, rate of 72, normal QRS complex, normal axis, first-degree AV block, abnormal EKG    CT scan interpretation by me: Bilateral infiltrates concerning for multifocal pneumonia    ED Course as of 09/11/24 2248   Wed Sep 11, 2024   1908 Potassium(!!): 6.4  Calcium gluconate, insulin, dextrose, ordered as well as an EKG to assess for hyperkalemia [CS] "   1908 Hemoglobin(!!): 6.9 [CS]   1908 Hematocrit(!): 22.4 [CS]   2115 Message sent to the hospitalist for admission [CS]   2115 Review of previous  non ED visits, prior labs, prior imaging, available notes from prior evaluations or visits with specialists, medication list, allergies, past medical history, past surgical history     [CS]   2115 I Personally reviewed all laboratory studies performed in the emergency department  [CS]      ED Course User Index  [CS] Clive Atkins Jr., PA-C       Lab Results (last 24 hours)       Procedure Component Value Units Date/Time    CBC Auto Differential [[id number]]  (Abnormal) Collected: 09/11/24 1816    Specimen: Blood Updated: 09/11/24 1854     WBC 6.37 10*3/mm3      RBC 2.22 10*6/mm3      Hemoglobin 6.9 g/dL      Hematocrit 22.4 %      .9 fL      MCH 31.1 pg      MCHC 30.8 g/dL      RDW 13.6 %      RDW-SD 50.0 fl      MPV 11.4 fL      Platelets 173 10*3/mm3      Neutrophil % 69.4 %      Lymphocyte % 14.4 %      Monocyte % 14.8 %      Eosinophil % 0.6 %      Basophil % 0.3 %      Immature Grans % 0.5 %      Neutrophils, Absolute 4.42 10*3/mm3      Lymphocytes, Absolute 0.92 10*3/mm3      Monocytes, Absolute 0.94 10*3/mm3      Eosinophils, Absolute 0.04 10*3/mm3      Basophils, Absolute 0.02 10*3/mm3      Immature Grans, Absolute 0.03 10*3/mm3      nRBC 0.0 /100 WBC     Comprehensive Metabolic Panel [[id number]]  (Abnormal) Collected: 09/11/24 1816    Specimen: Blood Updated: 09/11/24 1856     Glucose 87 mg/dL      BUN 40 mg/dL      Creatinine 1.62 mg/dL      Sodium 132 mmol/L      Potassium 6.4 mmol/L      Chloride 101 mmol/L      CO2 22.0 mmol/L      Calcium 8.7 mg/dL      Total Protein 6.8 g/dL      Albumin 3.5 g/dL      ALT (SGPT) 85 U/L      AST (SGOT) 37 U/L      Alkaline Phosphatase 93 U/L      Total Bilirubin <0.2 mg/dL      Globulin 3.3 gm/dL      Comment: Calculated Result        A/G Ratio 1.1 g/dL      BUN/Creatinine Ratio 24.7     Anion Gap 9.0 mmol/L       "eGFR 46.2 mL/min/1.73     Narrative:      GFR Normal >60  Chronic Kidney Disease <60  Kidney Failure <15      Lactic Acid, Plasma [[id number]]  (Normal) Collected: 09/11/24 1816    Specimen: Blood Updated: 09/11/24 1840     Lactate 0.5 mmol/L      Comment: Falsely depressed results may occur on samples drawn from patients receiving N-Acetylcysteine (NAC) or Metamizole.       Procalcitonin [[id number]]  (Normal) Collected: 09/11/24 1816    Specimen: Blood Updated: 09/11/24 1854     Procalcitonin 0.11 ng/mL     Narrative:      As a Marker for Sepsis (Non-Neonates):    1. <0.5 ng/mL represents a low risk of severe sepsis and/or septic shock.  2. >2 ng/mL represents a high risk of severe sepsis and/or septic shock.    As a Marker for Lower Respiratory Tract Infections that require antibiotic therapy:    PCT on Admission    Antibiotic Therapy       6-12 Hrs later    >0.5                Strongly Recommended  >0.25 - <0.5        Recommended   0.1 - 0.25          Discouraged              Remeasure/reassess PCT  <0.1                Strongly Discouraged     Remeasure/reassess PCT    As 28 day mortality risk marker: \"Change in Procalcitonin Result\" (>80% or <=80%) if Day 0 (or Day 1) and Day 4 values are available. Refer to http://[email or web address]-pct-calculator.com    Change in PCT <=80%  A decrease of PCT levels below or equal to 80% defines a positive change in PCT test result representing a higher risk for 28-day all-cause mortality of patients diagnosed with severe sepsis for septic shock.    Change in PCT >80%  A decrease of PCT levels of more than 80% defines a negative change in PCT result representing a lower risk for 28-day all-cause mortality of patients diagnosed with severe sepsis or septic shock.       Sedimentation Rate [[id number]]  (Abnormal) Collected: 09/11/24 1816    Specimen: Blood Updated: 09/11/24 1853     Sed Rate 27 mm/hr     C-reactive Protein [[id number]]  (Abnormal) Collected: 09/11/24 1816    Specimen: Blood " Updated: 09/11/24 1851     C-Reactive Protein 1.64 mg/dL     Magnesium [[id number]]  (Normal) Collected: 09/11/24 1816    Specimen: Blood Updated: 09/11/24 1856     Magnesium 2.2 mg/dL     Valproic Acid Level, Total [[id number]]  (Abnormal) Collected: 09/11/24 1816    Specimen: Blood Updated: 09/11/24 1955     Valproic Acid 13.4 mcg/mL     Narrative:      Therapeutic Ranges for Valproic Acid    Epilepsy:       mcg/ml  Bipolar/[place]  up to 125 mcg/ml      Respiratory Panel PCR w/COVID-19(SARS-CoV-2) [facility]-House, NP Swab in UTM/VTM, 2 HR TAT - Swab, Nasopharynx [[id number]]  (Abnormal) Collected: 09/11/24 1851    Specimen: Swab from Nasopharynx Updated: 09/11/24 2000     ADENOVIRUS, PCR Not Detected     Coronavirus 229E Not Detected     Coronavirus HKU1 Not Detected     Coronavirus NL63 Not Detected     Coronavirus OC43 Not Detected     COVID19 Detected     Human Metapneumovirus Not Detected     Human Rhinovirus/Enterovirus Not Detected     Influenza A PCR Not Detected     Influenza B PCR Not Detected     Parainfluenza Virus 1 Not Detected     Parainfluenza Virus 2 Not Detected     Parainfluenza Virus 3 Not Detected     Parainfluenza Virus 4 Not Detected     RSV, PCR Not Detected     Bordetella pertussis pcr Not Detected     Bordetella parapertussis PCR Not Detected     Chlamydophila pneumoniae PCR Not Detected     Mycoplasma pneumo by PCR Not Detected    Narrative:      In the setting of a positive respiratory panel with a viral infection PLUS a negative procalcitonin without other underlying concern for bacterial infection, consider observing off antibiotics or discontinuation of antibiotics and continue supportive care. If the respiratory panel is positive for atypical bacterial infection (Bordetella pertussis, Chlamydophila pneumoniae, or Mycoplasma pneumoniae), consider antibiotic de-escalation to target atypical bacterial infection.    Urinalysis With Culture If Indicated - Straight Cath  [[id number]]  (Abnormal) Collected: 09/11/24 1905    Specimen: Urine from Straight Cath Updated: 09/11/24 1919     Color, UA Yellow     Appearance, UA Clear     pH, UA 7.5     Specific Gravity, UA 1.013     Glucose, UA Negative     Ketones, UA Negative     Bilirubin, UA Negative     Blood, UA Negative     Protein, UA Trace     Leuk Esterase, UA Trace     Nitrite, UA Negative     Urobilinogen, UA 1.0 E.U./dL    Narrative:      In absence of clinical symptoms, the presence of pyuria, bacteria, and/or nitrites on the urinalysis result does not correlate with infection.    Urinalysis, Microscopic Only - Straight Cath [[id number]]  (Abnormal) Collected: 09/11/24 1905    Specimen: Urine from Straight Cath Updated: 09/11/24 1919     RBC, UA 11-20 /HPF      WBC, UA 0-2 /HPF      Comment: Urine culture not indicated.        Bacteria, UA None Seen /HPF      Squamous Epithelial Cells, UA 0-2 /HPF      Hyaline Casts, UA None Seen /LPF      Methodology Automated Microscopy    POC Occult Blood Stool [[id number]]  (Normal) Resulted: 09/11/24 2059    Specimen: Stool from Per Rectum Updated: 09/11/24 2100     Fecal Occult Blood Negative     Lot Number [id number]     Expiration Date 11/01/2026     DEVELOPER LOT NUMBER [id number]     DEVELOPER EXPIRATION DATE 08/01/2027     Positive Control Positive     Negative Control Negative    Blood Culture - Blood, Arm, Right [[id number]] Collected: 09/11/24 2144    Specimen: Blood from Arm, Right Updated: 09/11/24 2240    Blood Culture - Blood, Arm, Left [[id number]] Collected: 09/11/24 2144    Specimen: Blood from Arm, Left Updated: 09/11/24 2240    Blood Gas, Arterial With Co-Ox [[id number]]  (Abnormal) Collected: 09/11/24 2151    Specimen: Arterial Blood Updated: 09/11/24 2151     Site Right Radial     Golden's Test N/A     pH, Arterial 7.369 pH units      pCO2, Arterial 42.2 mm Hg      pO2, Arterial 131.0 mm Hg      Comment: 83 Value above reference range        HCO3, Arterial 24.3 mmol/L      Base  Excess, Arterial -1.0 mmol/L      Hemoglobin, Blood Gas 7.2 g/dL      Comment: 84 Value below reference range        Hematocrit, Blood Gas 22.2 %      Oxyhemoglobin 97.9 %      Methemoglobin 0.40 %      Carboxyhemoglobin 1.3 %      CO2 Content 25.6 mmol/L      Temperature 37.0     Barometric Pressure for Blood Gas --     Comment: N/A        Modality Room Air     FIO2 21 %      Rate 0 Breaths/minute      PIP 0 cmH2O      Comment: Meter: [id number]     :  [id number]        IPAP 0     EPAP 0     pH, Temp Corrected 7.369 pH Units      pCO2, Temperature Corrected 42.2 mm Hg      pO2, Temperature Corrected 131 mm Hg              CT Chest With Contrast Diagnostic    Result Date: 9/11/2024  CT CHEST W CONTRAST DIAGNOSTIC, CT ABDOMEN PELVIS W CONTRAST Date of Exam: 9/11/2024 7:26 PM EDT Indication: covid, fever. Comparison: 3/14/2024 Technique: Axial CT images were obtained of the chest, abdomen and pelvis after the uneventful intravenous administration of 85 cc Isovue-300.  Reconstructed coronal and sagittal images were also obtained. Automated exposure control and iterative construction methods were used. Findings: CT chest: Lower Neck: Unremarkable. Hilum and Mediastinum: Scattered prominent mediastinal and perihilar lymph nodes. No pathologically enlarged lymph nodes. Mild cardiomegaly. Trace pericardial effusion. The esophagus is mildly patulous. No definite suspicious mass. The vasculature is grossly unremarkable. Lung Parenchyma and Pleura: Patchy opacities scattered throughout the lungs, that are more consolidative in the lower lobes bilaterally. Trace bilateral pleural effusions. No pneumothorax. The central airways are patent. Soft tissues: Unremarkable. Osseous structures: No acute osseous abnormality.. Mild cortical deformity of the superior endplate of T9 most likely represents a small Schmorl's node, unchanged. CT abdomen and pelvis: Liver: Liver is normal in size and CT density. No focal lesions.  Gallbladder: The gallbladder is normal without evidence of radiopaque stones. Bile Ducts: No billiary dcutal dilation. Spleen: Spleen is normal in size and CT density. Pancreas: Pancreas is normal. There is no evidence of pancreatic mass or peripancreatic fluid. Adrenals: Adrenal glands are unremarkable. Kidneys: Kidneys are normal in size. There are no stones or hydronephrosis. Gastrointestinal: Mild to moderate stool burden noted throughout the colon with a small to moderate-sized stool ball within the rectum. No acute inflammatory changes. No significant distention to suggest bowel obstruction. Mild diverticulosis of the descending and sigmoid colon without evidence of acute diverticulitis. G-tube is in adequate position. Possible small lipoma within the greater curvature of the stomach. Bladder: Circumferential bladder wall thickening Pelvis:  No suspecious mass. Peritoneum/Mesentery: No fluid collection, ascities, or free air.   Lymph Nodes: No lymphadenopathy. Vasculature: Unremarkable Abdominal Wall: Unremarkable Bony Structures: No acute osseous abnormality. Unchanged grade 1 anterolisthesis of L4 and L5.     Impression: Impression: Findings consistent with multifocal pneumonia/pneumonitis, most significantly involving the lower lobes bilaterally. No definite acute intra-abdominal intrapelvic abnormality. Mild circumferential bladder wall thickening is nonspecific but may represent cystitis. Please correlate with urinalysis. Evidence of possible fecal impaction with associated mild constipation. Electronically Signed: Ruben Medina DO  9/11/2024 7:50 PM EDT  Workstation ID: [id number]    CT Abdomen Pelvis With Contrast    Result Date: 9/11/2024  CT CHEST W CONTRAST DIAGNOSTIC, CT ABDOMEN PELVIS W CONTRAST Date of Exam: 9/11/2024 7:26 PM EDT Indication: covid, fever. Comparison: 3/14/2024 Technique: Axial CT images were obtained of the chest, abdomen and pelvis after the uneventful intravenous  administration of 85 cc Isovue-300.  Reconstructed coronal and sagittal images were also obtained. Automated exposure control and iterative construction methods were used. Findings: CT chest: Lower Neck: Unremarkable. Hilum and Mediastinum: Scattered prominent mediastinal and perihilar lymph nodes. No pathologically enlarged lymph nodes. Mild cardiomegaly. Trace pericardial effusion. The esophagus is mildly patulous. No definite suspicious mass. The vasculature is grossly unremarkable. Lung Parenchyma and Pleura: Patchy opacities scattered throughout the lungs, that are more consolidative in the lower lobes bilaterally. Trace bilateral pleural effusions. No pneumothorax. The central airways are patent. Soft tissues: Unremarkable. Osseous structures: No acute osseous abnormality.. Mild cortical deformity of the superior endplate of T9 most likely represents a small Schmorl's node, unchanged. CT abdomen and pelvis: Liver: Liver is normal in size and CT density. No focal lesions. Gallbladder: The gallbladder is normal without evidence of radiopaque stones. Bile Ducts: No billiary dcutal dilation. Spleen: Spleen is normal in size and CT density. Pancreas: Pancreas is normal. There is no evidence of pancreatic mass or peripancreatic fluid. Adrenals: Adrenal glands are unremarkable. Kidneys: Kidneys are normal in size. There are no stones or hydronephrosis. Gastrointestinal: Mild to moderate stool burden noted throughout the colon with a small to moderate-sized stool ball within the rectum. No acute inflammatory changes. No significant distention to suggest bowel obstruction. Mild diverticulosis of the descending and sigmoid colon without evidence of acute diverticulitis. G-tube is in adequate position. Possible small lipoma within the greater curvature of the stomach. Bladder: Circumferential bladder wall thickening Pelvis:  No suspecious mass. Peritoneum/Mesentery: No fluid collection, ascities, or free air.   Lymph  Nodes: No lymphadenopathy. Vasculature: Unremarkable Abdominal Wall: Unremarkable Bony Structures: No acute osseous abnormality. Unchanged grade 1 anterolisthesis of L4 and L5.     Impression: Impression: Findings consistent with multifocal pneumonia/pneumonitis, most significantly involving the lower lobes bilaterally. No definite acute intra-abdominal intrapelvic abnormality. Mild circumferential bladder wall thickening is nonspecific but may represent cystitis. Please correlate with urinalysis. Evidence of possible fecal impaction with associated mild constipation. Electronically Signed: Ruben Medina DO  9/11/2024 7:50 PM EDT  Workstation ID: [id number]    CT Head Without Contrast    Result Date: 9/11/2024  CT HEAD WO CONTRAST Date of Exam: 9/11/2024 7:26 PM EDT Indication: possible sz. Comparison: 3/11/2024 Technique: Axial CT images were obtained of the head without contrast administration.  Automated exposure control and iterative construction methods were used. Findings: There is no evidence of hemorrhage. There is no mass effect or midline shift. Diffuse brain atrophy with chronic microvascular ischemic changes There is no extracerebral collection. Ventricles are normal in size and configuration for patient's stated age. Posterior fossa is within normal limits. Calvarium and skull base appear intact. Mucosal thickening in the bilateral maxillary sinuses and ethmoids. Small air-fluid level left sphenoid. Stable chronic findings with possible surgical change in the right globe.     Impression: Impression: No acute intracranial abnormality Electronically Signed: Aramis lCark MD  9/11/2024 7:42 PM EDT  Workstation ID: [id number]        Medical Decision Making  Patient Presentation 69-year-old male presented emergency department with altered mental status, weakness, fatigue, tested COVID-positive at the nursing home    DDX COVID, pneumonia, respiratory failure hypoxia, electrolyte abnormality, sepsis,  bacteremia    Data Review/ Non ED Records /Analysis/Ordering unique tests  Review of previous  non ED visits, prior labs, prior imaging, available notes from prior evaluations or visits with specialists, medication list, allergies, past medical history, past surgical history        Independent Review Studies  I Personally reviewed all laboratory studies performed in the emergency department     Intervention/Re-evaluation intervention included IV fluids, broad-spectrum antibiotics and admission    Independent Clinician discussed with the on-call hospitalist    Risk Stratification tools/clinical decision rules patient presented as a nursing home patient, previous history of admission for pneumonias, he was confused and altered, above his baseline.  He tested positive for COVID, was concerned about respiratory failure hypoxia, pneumonia, sepsis, bacteremia, he was found to be acutely anemic requiring packed red blood cells, also found to be hyperkalemic requiring calcium gluconate, insulin and glucose and he had multifocal pneumonia requiring broad-spectrum antibiotics, he was admitted for further care    Shared Decision Making patient admitted    Disposition admission     Problems Addressed:  Acute anemia: complicated acute illness or injury  COVID-19: complicated acute illness or injury  Generalized muscle weakness: complicated acute illness or injury  Hyperkalemia: complicated acute illness or injury  Moderate Alzheimer's dementia with psychotic disturbance, unspecified timing of dementia onset: complicated acute illness or injury  Multifocal pneumonia: complicated acute illness or injury  Oropharyngeal dysphagia: complicated acute illness or injury    Amount and/or Complexity of Data Reviewed  Independent Historian: EMS  External Data Reviewed: labs, radiology and notes.     Details: Review of previous  non ED visits, prior labs, prior imaging, available notes from prior evaluations or visits with specialists,  medication list, allergies, past medical history, past surgical history      Labs: ordered. Decision-making details documented in ED Course.     Details: I Personally reviewed all laboratory studies performed in the emergency department   Radiology: ordered and independent interpretation performed. Decision-making details documented in ED Course.  ECG/medicine tests: ordered and independent interpretation performed. Decision-making details documented in ED Course.    Risk  OTC drugs.  Prescription drug management.  Decision regarding hospitalization.          Final diagnoses:   Multifocal pneumonia   COVID-19   Hyperkalemia   Acute anemia   Moderate Alzheimer's dementia with psychotic disturbance, unspecified timing of dementia onset   Oropharyngeal dysphagia   Generalized muscle weakness           Disposition admission       Clive Atkisn Jr., PA-C  09/11/24 3770

## 2024-09-11 NOTE — LETTER
EMS Transport Request  For use at [facility], [place], [place], [place], and [place] only   Patient Name: Omkar Nava : 1955   Weight:84.2 kg (185 lb 11.2 oz) Pick-up Location: [facility] BLS/ALS:    Insurance: MEDICARE Auth End Date:    Pre-Cert #: D/C Summary complete:    Destination: [facility]   Contact Precautions: covid   Equipment (O2, Fluids, etc.): 2l NC   Arrive By Date/Time:  Stretcher/WC: stretcher   CM Requesting: Chely Almonte RN Ext: [phone]   Notes/Medical Necessity: pt unable to stand or pivot     ______________________________________________________________________    *Only 2 patient bags OR 1 carry-on size bag are permitted.  Wheelchairs and walkers CANNOT transported with the patient. Acknowledge: yes

## 2024-09-11 NOTE — Clinical Note
Level of Care: Telemetry [5]   Diagnosis: Hypoxia [780926]   Admitting Physician: EMILY BILLS [[id number]]   Attending Physician: EMILY BILLS [[id number]]

## 2024-09-11 NOTE — LETTER
EMS Transport Request  For use at [facility], [place], [place], [place], and [place] only   Patient Name: Omkar Nava : 1955   Weight:83.7 kg (184 lb 9.6 oz) Pick-up Location: [facility] BLS/ALS: bls   Insurance: MEDICARE Auth End Date:    Pre-Cert #: D/C Summary complete:    Destination: [facility]   Contact Precautions: covid   Equipment (O2, Fluids, etc.): 3 l nc   Arrive By Date/Time:  Stretcher/WC: stretcher   CM Requesting: Chely Almonte RN Ext: [phone]   Notes/Medical Necessity: pt unable to stand or pivot      ______________________________________________________________________    *Only 2 patient bags OR 1 carry-on size bag are permitted.  Wheelchairs and walkers CANNOT transported with the patient. Acknowledge: yes

## 2024-09-12 PROBLEM — U07.1 COVID-19 VIRUS DETECTED: Status: ACTIVE | Noted: 2024-09-12

## 2024-09-12 LAB
ALBUMIN SERPL-MCNC: 3.3 G/DL (ref 3.5–5.2)
ALBUMIN/GLOB SERPL: 0.9 G/DL
ALP SERPL-CCNC: 91 U/L (ref 39–117)
ALT SERPL W P-5'-P-CCNC: 78 U/L (ref 1–41)
ANION GAP SERPL CALCULATED.3IONS-SCNC: 11 MMOL/L (ref 5–15)
AST SERPL-CCNC: 37 U/L (ref 1–40)
BASOPHILS # BLD AUTO: 0.01 10*3/MM3 (ref 0–0.2)
BASOPHILS NFR BLD AUTO: 0.2 % (ref 0–1.5)
BH BB BLOOD EXPIRATION DATE: NORMAL
BH BB BLOOD TYPE BARCODE: 5100
BH BB DISPENSE STATUS: NORMAL
BH BB PRODUCT CODE: NORMAL
BH BB UNIT NUMBER: NORMAL
BILIRUB SERPL-MCNC: 0.2 MG/DL (ref 0–1.2)
BUN SERPL-MCNC: 34 MG/DL (ref 8–23)
BUN/CREAT SERPL: 29.3 (ref 7–25)
CALCIUM SPEC-SCNC: 8.8 MG/DL (ref 8.6–10.5)
CHLORIDE SERPL-SCNC: 104 MMOL/L (ref 98–107)
CO2 SERPL-SCNC: 19 MMOL/L (ref 22–29)
CREAT SERPL-MCNC: 1.16 MG/DL (ref 0.76–1.27)
CROSSMATCH INTERPRETATION: NORMAL
DEPRECATED RDW RBC AUTO: 50.8 FL (ref 37–54)
EGFRCR SERPLBLD CKD-EPI 2021: 69 ML/MIN/1.73
EOSINOPHIL # BLD AUTO: 0.01 10*3/MM3 (ref 0–0.4)
EOSINOPHIL NFR BLD AUTO: 0.2 % (ref 0.3–6.2)
ERYTHROCYTE [DISTWIDTH] IN BLOOD BY AUTOMATED COUNT: 14.4 % (ref 12.3–15.4)
FERRITIN SERPL-MCNC: 308.3 NG/ML (ref 30–400)
FOLATE SERPL-MCNC: 12.1 NG/ML (ref 4.78–24.2)
GLOBULIN UR ELPH-MCNC: 3.5 GM/DL
GLUCOSE BLDC GLUCOMTR-MCNC: 116 MG/DL (ref 70–130)
GLUCOSE BLDC GLUCOMTR-MCNC: 120 MG/DL (ref 70–130)
GLUCOSE BLDC GLUCOMTR-MCNC: 139 MG/DL (ref 70–130)
GLUCOSE BLDC GLUCOMTR-MCNC: 149 MG/DL (ref 70–130)
GLUCOSE BLDC GLUCOMTR-MCNC: 159 MG/DL (ref 70–130)
GLUCOSE BLDC GLUCOMTR-MCNC: 54 MG/DL (ref 70–130)
GLUCOSE BLDC GLUCOMTR-MCNC: 82 MG/DL (ref 70–130)
GLUCOSE BLDC GLUCOMTR-MCNC: 85 MG/DL (ref 70–130)
GLUCOSE SERPL-MCNC: 75 MG/DL (ref 65–99)
HCT VFR BLD AUTO: 26.9 % (ref 37.5–51)
HGB BLD-MCNC: 8.6 G/DL (ref 13–17.7)
IMM GRANULOCYTES # BLD AUTO: 0.02 10*3/MM3 (ref 0–0.05)
IMM GRANULOCYTES NFR BLD AUTO: 0.3 % (ref 0–0.5)
IRON 24H UR-MRATE: 26 MCG/DL (ref 59–158)
IRON SATN MFR SERPL: 7 % (ref 20–50)
L PNEUMO1 AG UR QL IA: NEGATIVE
LYMPHOCYTES # BLD AUTO: 1.17 10*3/MM3 (ref 0.7–3.1)
LYMPHOCYTES NFR BLD AUTO: 17.8 % (ref 19.6–45.3)
MAGNESIUM SERPL-MCNC: 1.9 MG/DL (ref 1.6–2.4)
MCH RBC QN AUTO: 30.7 PG (ref 26.6–33)
MCHC RBC AUTO-ENTMCNC: 32 G/DL (ref 31.5–35.7)
MCV RBC AUTO: 96.1 FL (ref 79–97)
MONOCYTES # BLD AUTO: 1.04 10*3/MM3 (ref 0.1–0.9)
MONOCYTES NFR BLD AUTO: 15.8 % (ref 5–12)
MRSA DNA SPEC QL NAA+PROBE: POSITIVE
NEUTROPHILS NFR BLD AUTO: 4.32 10*3/MM3 (ref 1.7–7)
NEUTROPHILS NFR BLD AUTO: 65.7 % (ref 42.7–76)
NRBC BLD AUTO-RTO: 0 /100 WBC (ref 0–0.2)
PLATELET # BLD AUTO: 165 10*3/MM3 (ref 140–450)
PMV BLD AUTO: 10.8 FL (ref 6–12)
POTASSIUM SERPL-SCNC: 5.3 MMOL/L (ref 3.5–5.2)
POTASSIUM SERPL-SCNC: 6.3 MMOL/L (ref 3.5–5.2)
PROT SERPL-MCNC: 6.8 G/DL (ref 6–8.5)
QT INTERVAL: 348 MS
QTC INTERVAL: 364 MS
RBC # BLD AUTO: 2.8 10*6/MM3 (ref 4.14–5.8)
RETICS # AUTO: 0.03 10*6/MM3 (ref 0.02–0.13)
RETICS/RBC NFR AUTO: 1.22 % (ref 0.7–1.9)
S PNEUM AG SPEC QL LA: NEGATIVE
SODIUM SERPL-SCNC: 134 MMOL/L (ref 136–145)
TIBC SERPL-MCNC: 355 MCG/DL (ref 298–536)
TRANSFERRIN SERPL-MCNC: 238 MG/DL (ref 200–360)
UNIT  ABO: NORMAL
UNIT  RH: NORMAL
VIT B12 BLD-MCNC: 1158 PG/ML (ref 211–946)
WBC NRBC COR # BLD AUTO: 6.57 10*3/MM3 (ref 3.4–10.8)

## 2024-09-12 PROCEDURE — 63710000001 DEXAMETHASONE PER 0.25 MG: Performed by: STUDENT IN AN ORGANIZED HEALTH CARE EDUCATION/TRAINING PROGRAM

## 2024-09-12 PROCEDURE — 94799 UNLISTED PULMONARY SVC/PX: CPT

## 2024-09-12 PROCEDURE — 80053 COMPREHEN METABOLIC PANEL: CPT | Performed by: STUDENT IN AN ORGANIZED HEALTH CARE EDUCATION/TRAINING PROGRAM

## 2024-09-12 PROCEDURE — 25810000003 DEXTROSE 5 % AND SODIUM CHLORIDE 0.9 % 5-0.9 % SOLUTION: Performed by: INTERNAL MEDICINE

## 2024-09-12 PROCEDURE — 84132 ASSAY OF SERUM POTASSIUM: CPT | Performed by: STUDENT IN AN ORGANIZED HEALTH CARE EDUCATION/TRAINING PROGRAM

## 2024-09-12 PROCEDURE — 25010000002 ERAVACYCLINE DIHYDROCHLORIDE 50 MG RECONSTITUTED SOLUTION 1 EACH VIAL: Performed by: STUDENT IN AN ORGANIZED HEALTH CARE EDUCATION/TRAINING PROGRAM

## 2024-09-12 PROCEDURE — 85025 COMPLETE CBC W/AUTO DIFF WBC: CPT | Performed by: STUDENT IN AN ORGANIZED HEALTH CARE EDUCATION/TRAINING PROGRAM

## 2024-09-12 PROCEDURE — 25010000002 CEFEPIME PER 500 MG: Performed by: INTERNAL MEDICINE

## 2024-09-12 PROCEDURE — 63710000001 INSULIN REGULAR HUMAN PER 5 UNITS: Performed by: STUDENT IN AN ORGANIZED HEALTH CARE EDUCATION/TRAINING PROGRAM

## 2024-09-12 PROCEDURE — 87449 NOS EACH ORGANISM AG IA: CPT | Performed by: STUDENT IN AN ORGANIZED HEALTH CARE EDUCATION/TRAINING PROGRAM

## 2024-09-12 PROCEDURE — 25010000002 CALCIUM GLUCONATE-NACL 1-0.675 GM/50ML-% SOLUTION: Performed by: STUDENT IN AN ORGANIZED HEALTH CARE EDUCATION/TRAINING PROGRAM

## 2024-09-12 PROCEDURE — 87899 AGENT NOS ASSAY W/OPTIC: CPT | Performed by: INTERNAL MEDICINE

## 2024-09-12 PROCEDURE — 94664 DEMO&/EVAL PT USE INHALER: CPT

## 2024-09-12 PROCEDURE — 93005 ELECTROCARDIOGRAM TRACING: CPT | Performed by: STUDENT IN AN ORGANIZED HEALTH CARE EDUCATION/TRAINING PROGRAM

## 2024-09-12 PROCEDURE — 99223 1ST HOSP IP/OBS HIGH 75: CPT | Performed by: STUDENT IN AN ORGANIZED HEALTH CARE EDUCATION/TRAINING PROGRAM

## 2024-09-12 PROCEDURE — 25810000003 SODIUM CHLORIDE 0.9 % SOLUTION: Performed by: STUDENT IN AN ORGANIZED HEALTH CARE EDUCATION/TRAINING PROGRAM

## 2024-09-12 PROCEDURE — 87641 MR-STAPH DNA AMP PROBE: CPT | Performed by: STUDENT IN AN ORGANIZED HEALTH CARE EDUCATION/TRAINING PROGRAM

## 2024-09-12 PROCEDURE — 82948 REAGENT STRIP/BLOOD GLUCOSE: CPT

## 2024-09-12 PROCEDURE — 25010000002 REMDESIVIR 100 MG/20ML SOLUTION 1 EACH VIAL: Performed by: STUDENT IN AN ORGANIZED HEALTH CARE EDUCATION/TRAINING PROGRAM

## 2024-09-12 PROCEDURE — 93010 ELECTROCARDIOGRAM REPORT: CPT | Performed by: INTERNAL MEDICINE

## 2024-09-12 PROCEDURE — 92610 EVALUATE SWALLOWING FUNCTION: CPT | Performed by: SPEECH-LANGUAGE PATHOLOGIST

## 2024-09-12 PROCEDURE — 0 DEXTROSE 5 % SOLUTION: Performed by: INTERNAL MEDICINE

## 2024-09-12 PROCEDURE — 94640 AIRWAY INHALATION TREATMENT: CPT

## 2024-09-12 PROCEDURE — 25810000003 SODIUM CHLORIDE 0.9 % SOLUTION 250 ML FLEX CONT: Performed by: STUDENT IN AN ORGANIZED HEALTH CARE EDUCATION/TRAINING PROGRAM

## 2024-09-12 PROCEDURE — 94761 N-INVAS EAR/PLS OXIMETRY MLT: CPT

## 2024-09-12 PROCEDURE — 83735 ASSAY OF MAGNESIUM: CPT | Performed by: STUDENT IN AN ORGANIZED HEALTH CARE EDUCATION/TRAINING PROGRAM

## 2024-09-12 RX ORDER — TORSEMIDE 10 MG/1
5 TABLET ORAL DAILY
Status: DISCONTINUED | OUTPATIENT
Start: 2024-09-12 | End: 2024-09-18 | Stop reason: HOSPADM

## 2024-09-12 RX ORDER — SODIUM CHLORIDE 9 MG/ML
40 INJECTION, SOLUTION INTRAVENOUS AS NEEDED
Status: DISCONTINUED | OUTPATIENT
Start: 2024-09-12 | End: 2024-09-18 | Stop reason: HOSPADM

## 2024-09-12 RX ORDER — DEXTROSE MONOHYDRATE AND SODIUM CHLORIDE 5; .9 G/100ML; G/100ML
50 INJECTION, SOLUTION INTRAVENOUS CONTINUOUS
Status: DISCONTINUED | OUTPATIENT
Start: 2024-09-12 | End: 2024-09-13

## 2024-09-12 RX ORDER — ACETAMINOPHEN 160 MG/5ML
650 SOLUTION ORAL EVERY 6 HOURS PRN
Status: DISCONTINUED | OUTPATIENT
Start: 2024-09-12 | End: 2024-09-18 | Stop reason: HOSPADM

## 2024-09-12 RX ORDER — POLYETHYLENE GLYCOL 3350 17 G/17G
17 POWDER, FOR SOLUTION ORAL DAILY PRN
Status: DISCONTINUED | OUTPATIENT
Start: 2024-09-12 | End: 2024-09-12

## 2024-09-12 RX ORDER — HYDROXYZINE HYDROCHLORIDE 25 MG/1
25 TABLET, FILM COATED ORAL 3 TIMES DAILY PRN
Status: DISCONTINUED | OUTPATIENT
Start: 2024-09-12 | End: 2024-09-18 | Stop reason: HOSPADM

## 2024-09-12 RX ORDER — SODIUM CHLORIDE 9 MG/ML
50 INJECTION, SOLUTION INTRAVENOUS CONTINUOUS
Status: DISCONTINUED | OUTPATIENT
Start: 2024-09-12 | End: 2024-09-12

## 2024-09-12 RX ORDER — AMOXICILLIN 250 MG
2 CAPSULE ORAL 2 TIMES DAILY PRN
Status: DISCONTINUED | OUTPATIENT
Start: 2024-09-12 | End: 2024-09-18 | Stop reason: HOSPADM

## 2024-09-12 RX ORDER — BISACODYL 5 MG/1
5 TABLET, DELAYED RELEASE ORAL DAILY PRN
Status: DISCONTINUED | OUTPATIENT
Start: 2024-09-12 | End: 2024-09-12 | Stop reason: ALTCHOICE

## 2024-09-12 RX ORDER — HYDRALAZINE HYDROCHLORIDE 50 MG/1
100 TABLET, FILM COATED ORAL EVERY 8 HOURS SCHEDULED
Status: DISCONTINUED | OUTPATIENT
Start: 2024-09-12 | End: 2024-09-18 | Stop reason: HOSPADM

## 2024-09-12 RX ORDER — TIMOLOL MALEATE 5 MG/ML
1 SOLUTION/ DROPS OPHTHALMIC 2 TIMES DAILY
Status: DISCONTINUED | OUTPATIENT
Start: 2024-09-12 | End: 2024-09-18 | Stop reason: HOSPADM

## 2024-09-12 RX ORDER — DOXYCYCLINE 100 MG/1
100 CAPSULE ORAL EVERY 12 HOURS
Status: DISCONTINUED | OUTPATIENT
Start: 2024-09-12 | End: 2024-09-18

## 2024-09-12 RX ORDER — BRIMONIDINE TARTRATE 2 MG/ML
1 SOLUTION/ DROPS OPHTHALMIC 3 TIMES DAILY
Status: DISCONTINUED | OUTPATIENT
Start: 2024-09-12 | End: 2024-09-18 | Stop reason: HOSPADM

## 2024-09-12 RX ORDER — AMOXICILLIN 250 MG
2 CAPSULE ORAL 2 TIMES DAILY PRN
Status: DISCONTINUED | OUTPATIENT
Start: 2024-09-12 | End: 2024-09-12

## 2024-09-12 RX ORDER — DEXAMETHASONE SODIUM PHOSPHATE 10 MG/ML
6 INJECTION INTRAMUSCULAR; INTRAVENOUS DAILY
Status: DISCONTINUED | OUTPATIENT
Start: 2024-09-12 | End: 2024-09-12

## 2024-09-12 RX ORDER — ALBUTEROL SULFATE 0.83 MG/ML
10 SOLUTION RESPIRATORY (INHALATION) ONCE
Status: COMPLETED | OUTPATIENT
Start: 2024-09-12 | End: 2024-09-12

## 2024-09-12 RX ORDER — CALCIUM GLUCONATE 20 MG/ML
1000 INJECTION, SOLUTION INTRAVENOUS ONCE
Status: COMPLETED | OUTPATIENT
Start: 2024-09-12 | End: 2024-09-12

## 2024-09-12 RX ORDER — AMINO ACIDS/PROTEIN HYDROLYS 11G-40/45
30 LIQUID IN PACKET (ML) ORAL 2 TIMES DAILY
Status: DISCONTINUED | OUTPATIENT
Start: 2024-09-12 | End: 2024-09-12

## 2024-09-12 RX ORDER — BISACODYL 10 MG
10 SUPPOSITORY, RECTAL RECTAL DAILY PRN
Status: DISCONTINUED | OUTPATIENT
Start: 2024-09-12 | End: 2024-09-12

## 2024-09-12 RX ORDER — BISACODYL 10 MG
10 SUPPOSITORY, RECTAL RECTAL DAILY PRN
Status: DISCONTINUED | OUTPATIENT
Start: 2024-09-12 | End: 2024-09-18 | Stop reason: HOSPADM

## 2024-09-12 RX ORDER — VALPROIC ACID 250 MG/5ML
150 SOLUTION ORAL EVERY 8 HOURS SCHEDULED
Status: DISCONTINUED | OUTPATIENT
Start: 2024-09-12 | End: 2024-09-12

## 2024-09-12 RX ORDER — DEXAMETHASONE SODIUM PHOSPHATE 10 MG/ML
6 INJECTION INTRAMUSCULAR; INTRAVENOUS DAILY
Status: DISCONTINUED | OUTPATIENT
Start: 2024-09-12 | End: 2024-09-18 | Stop reason: HOSPADM

## 2024-09-12 RX ORDER — CARVEDILOL 12.5 MG/1
12.5 TABLET ORAL EVERY 12 HOURS SCHEDULED
Status: DISCONTINUED | OUTPATIENT
Start: 2024-09-12 | End: 2024-09-18 | Stop reason: HOSPADM

## 2024-09-12 RX ORDER — SODIUM CHLORIDE 0.9 % (FLUSH) 0.9 %
10 SYRINGE (ML) INJECTION EVERY 12 HOURS SCHEDULED
Status: DISCONTINUED | OUTPATIENT
Start: 2024-09-12 | End: 2024-09-18 | Stop reason: HOSPADM

## 2024-09-12 RX ORDER — VALPROIC ACID 250 MG/5ML
150 SOLUTION ORAL EVERY 8 HOURS SCHEDULED
Status: DISCONTINUED | OUTPATIENT
Start: 2024-09-12 | End: 2024-09-18 | Stop reason: HOSPADM

## 2024-09-12 RX ORDER — DEXTROSE MONOHYDRATE 25 G/50ML
50 INJECTION, SOLUTION INTRAVENOUS ONCE
Status: COMPLETED | OUTPATIENT
Start: 2024-09-12 | End: 2024-09-12

## 2024-09-12 RX ORDER — DEXAMETHASONE 4 MG/1
6 TABLET ORAL DAILY
Status: DISCONTINUED | OUTPATIENT
Start: 2024-09-12 | End: 2024-09-18 | Stop reason: HOSPADM

## 2024-09-12 RX ORDER — CLONIDINE HYDROCHLORIDE 0.2 MG/1
0.2 TABLET ORAL EVERY 8 HOURS SCHEDULED
Status: DISCONTINUED | OUTPATIENT
Start: 2024-09-12 | End: 2024-09-18 | Stop reason: HOSPADM

## 2024-09-12 RX ORDER — AMLODIPINE BESYLATE 10 MG/1
10 TABLET ORAL
Status: DISCONTINUED | OUTPATIENT
Start: 2024-09-12 | End: 2024-09-18 | Stop reason: HOSPADM

## 2024-09-12 RX ORDER — SODIUM CHLORIDE 0.9 % (FLUSH) 0.9 %
10 SYRINGE (ML) INJECTION AS NEEDED
Status: DISCONTINUED | OUTPATIENT
Start: 2024-09-12 | End: 2024-09-18 | Stop reason: HOSPADM

## 2024-09-12 RX ORDER — ARIPIPRAZOLE 10 MG/1
5 TABLET ORAL DAILY
Status: DISCONTINUED | OUTPATIENT
Start: 2024-09-12 | End: 2024-09-18 | Stop reason: HOSPADM

## 2024-09-12 RX ORDER — DEXTROSE MONOHYDRATE 25 G/50ML
25 INJECTION, SOLUTION INTRAVENOUS ONCE
Status: COMPLETED | OUTPATIENT
Start: 2024-09-12 | End: 2024-09-12

## 2024-09-12 RX ORDER — QUETIAPINE FUMARATE 25 MG/1
25 TABLET, FILM COATED ORAL NIGHTLY
Status: DISCONTINUED | OUTPATIENT
Start: 2024-09-12 | End: 2024-09-18 | Stop reason: HOSPADM

## 2024-09-12 RX ORDER — DEXTROSE MONOHYDRATE 50 MG/ML
50 INJECTION, SOLUTION INTRAVENOUS CONTINUOUS
Status: DISCONTINUED | OUTPATIENT
Start: 2024-09-12 | End: 2024-09-12

## 2024-09-12 RX ORDER — TERAZOSIN 5 MG/1
5 CAPSULE ORAL EVERY 12 HOURS SCHEDULED
Status: DISCONTINUED | OUTPATIENT
Start: 2024-09-12 | End: 2024-09-18 | Stop reason: HOSPADM

## 2024-09-12 RX ORDER — DEXAMETHASONE 4 MG/1
6 TABLET ORAL DAILY
Status: DISCONTINUED | OUTPATIENT
Start: 2024-09-12 | End: 2024-09-12

## 2024-09-12 RX ORDER — POLYETHYLENE GLYCOL 3350 17 G/17G
17 POWDER, FOR SOLUTION ORAL DAILY PRN
Status: DISCONTINUED | OUTPATIENT
Start: 2024-09-12 | End: 2024-09-18 | Stop reason: HOSPADM

## 2024-09-12 RX ADMIN — Medication 10 ML: at 01:31

## 2024-09-12 RX ADMIN — AMLODIPINE BESYLATE 10 MG: 10 TABLET ORAL at 08:30

## 2024-09-12 RX ADMIN — DOXYCYCLINE 100 MG: 100 CAPSULE ORAL at 13:03

## 2024-09-12 RX ADMIN — VALPROIC ACID 150 MG: 250 SOLUTION ORAL at 06:30

## 2024-09-12 RX ADMIN — DEXTROSE MONOHYDRATE 50 ML: 25 INJECTION, SOLUTION INTRAVENOUS at 10:01

## 2024-09-12 RX ADMIN — DEXAMETHASONE 6 MG: 4 TABLET ORAL at 08:29

## 2024-09-12 RX ADMIN — TIMOLOL MALEATE 1 DROP: 5 SOLUTION/ DROPS OPHTHALMIC at 08:29

## 2024-09-12 RX ADMIN — IPRATROPIUM BROMIDE AND ALBUTEROL SULFATE 3 ML: 2.5; .5 SOLUTION RESPIRATORY (INHALATION) at 19:27

## 2024-09-12 RX ADMIN — CEFEPIME 2000 MG: 2 INJECTION, POWDER, FOR SOLUTION INTRAVENOUS at 10:49

## 2024-09-12 RX ADMIN — SENNOSIDES AND DOCUSATE SODIUM 2 TABLET: 50; 8.6 TABLET ORAL at 08:30

## 2024-09-12 RX ADMIN — IPRATROPIUM BROMIDE AND ALBUTEROL SULFATE 3 ML: 2.5; .5 SOLUTION RESPIRATORY (INHALATION) at 06:57

## 2024-09-12 RX ADMIN — TIMOLOL MALEATE 1 DROP: 5 SOLUTION/ DROPS OPHTHALMIC at 20:57

## 2024-09-12 RX ADMIN — DEXTROSE MONOHYDRATE 50 ML/HR: 50 INJECTION, SOLUTION INTRAVENOUS at 10:48

## 2024-09-12 RX ADMIN — IPRATROPIUM BROMIDE AND ALBUTEROL SULFATE 3 ML: 2.5; .5 SOLUTION RESPIRATORY (INHALATION) at 11:47

## 2024-09-12 RX ADMIN — ALBUTEROL SULFATE 10 MG: 2.5 SOLUTION RESPIRATORY (INHALATION) at 04:55

## 2024-09-12 RX ADMIN — DEXTROSE AND SODIUM CHLORIDE 50 ML/HR: 5; 900 INJECTION, SOLUTION INTRAVENOUS at 16:24

## 2024-09-12 RX ADMIN — SODIUM CHLORIDE 50 ML/HR: 9 INJECTION, SOLUTION INTRAVENOUS at 04:24

## 2024-09-12 RX ADMIN — REMDESIVIR 200 MG: 100 INJECTION, POWDER, LYOPHILIZED, FOR SOLUTION INTRAVENOUS at 03:07

## 2024-09-12 RX ADMIN — VALPROIC ACID 150 MG: 250 SOLUTION ORAL at 13:03

## 2024-09-12 RX ADMIN — BRIMONIDINE TARTRATE 1 DROP: 2 SOLUTION/ DROPS OPHTHALMIC at 20:57

## 2024-09-12 RX ADMIN — MINERAL OIL 5 ML: 1000 LIQUID ORAL at 08:33

## 2024-09-12 RX ADMIN — LANSOPRAZOLE 30 MG: 15 TABLET, ORALLY DISINTEGRATING ORAL at 06:30

## 2024-09-12 RX ADMIN — MINERAL OIL 5 ML: 1000 LIQUID ORAL at 18:30

## 2024-09-12 RX ADMIN — IPRATROPIUM BROMIDE AND ALBUTEROL SULFATE 3 ML: 2.5; .5 SOLUTION RESPIRATORY (INHALATION) at 01:13

## 2024-09-12 RX ADMIN — VALPROIC ACID 150 MG: 250 SOLUTION ORAL at 21:00

## 2024-09-12 RX ADMIN — CALCIUM GLUCONATE 1000 MG: 20 INJECTION, SOLUTION INTRAVENOUS at 06:30

## 2024-09-12 RX ADMIN — SODIUM BICARBONATE 50 MEQ: 84 INJECTION, SOLUTION INTRAVENOUS at 06:30

## 2024-09-12 RX ADMIN — CEFEPIME 2000 MG: 2 INJECTION, POWDER, FOR SOLUTION INTRAVENOUS at 18:29

## 2024-09-12 RX ADMIN — DEXTROSE MONOHYDRATE 25 G: 25 INJECTION, SOLUTION INTRAVENOUS at 06:29

## 2024-09-12 RX ADMIN — MINERAL OIL 5 ML: 1000 LIQUID ORAL at 20:56

## 2024-09-12 RX ADMIN — ERAVACYCLINE 80 MG: 50 INJECTION, POWDER, LYOPHILIZED, FOR SOLUTION INTRAVENOUS at 03:09

## 2024-09-12 RX ADMIN — Medication 10 ML: at 20:58

## 2024-09-12 RX ADMIN — BRIMONIDINE TARTRATE 1 DROP: 2 SOLUTION/ DROPS OPHTHALMIC at 08:29

## 2024-09-12 RX ADMIN — INSULIN HUMAN 10 UNITS: 100 INJECTION, SOLUTION PARENTERAL at 06:29

## 2024-09-12 NOTE — CASE MANAGEMENT/SOCIAL WORK
Discharge Planning Assessment  [facility]     Patient Name: Omkar Nava  MRN: [id number]  Today's Date: 9/12/2024    Admit Date: 9/11/2024    Plan: return to [facility]   Discharge Needs Assessment       Row Name 09/12/24 0904       Living Environment    People in Home facility resident    Current Living Arrangements extended care facility    Potentially Unsafe Housing Conditions none    In the past 12 months has the electric, gas, oil, or water company threatened to shut off services in your home? No    Primary Care Provided by other (see comments)    Provides Primary Care For no one    Family Caregiver if Needed other (see comments);child(mable), adult    Quality of Family Relationships involved;helpful    Able to Return to Prior Arrangements yes       Resource/Environmental Concerns    Resource/Environmental Concerns none    Transportation Concerns none       Transportation Needs    In the past 12 months, has lack of transportation kept you from medical appointments or from getting medications? no    In the past 12 months, has lack of transportation kept you from meetings, work, or from getting things needed for daily living? No       Food Insecurity    Within the past 12 months, you worried that your food would run out before you got the money to buy more. Never true    Within the past 12 months, the food you bought just didn't last and you didn't have money to get more. Never true       Transition Planning    Patient/Family Anticipates Transition to long-term care facility    Patient/Family Anticipated Services at Transition none    Transportation Anticipated family or friend will provide       Discharge Needs Assessment    Readmission Within the Last 30 Days no previous admission in last 30 days    Equipment Currently Used at Home wheelchair;oxygen    Concerns to be Addressed discharge planning    Anticipated Changes Related to Illness none    Equipment Needed After Discharge none                    Discharge Plan       Row Name 09/12/24 0905       Plan    Plan return to [facility]    Patient/Family in Agreement with Plan yes    Plan Comments Pt lives in a long term care bed at [facility]. He is wheelchair dependent and uses home O2. Pt requires assitance with his ADLs. He is followed by his PCP and has drug coverage. At this time plan for discharge is for pt to return home. Per [facility] they can accept pt back.    Final Discharge Disposition Code 04 - intermediate care facility                  Continued Care and Services - Admitted Since 9/11/2024    No active coordination exists for this encounter.          Demographic Summary       Row Name 09/12/24 0903       General Information    Admission Type inpatient    Referral Source physician    Reason for Consult discharge planning    Preferred Language English    General Information Comments PCP Alberto Dye       Contact Information    Permission Granted to Share Info With family/designee    Contact Information Comments Idalia [phone]                   Functional Status       Row Name 09/12/24 0904       Functional Status    Usual Activity Tolerance fair    Current Activity Tolerance fair       Physical Activity    On average, how many days per week do you engage in moderate to strenuous exercise (like a brisk walk)? 0 days    On average, how many minutes do you engage in exercise at this level? 0 min    Number of minutes of exercise per week 0       Functional Status, IADL    Medications completely dependent    Meal Preparation completely dependent    Housekeeping completely dependent    Laundry completely dependent    Shopping completely dependent       Mental Status    General Appearance WDL WDL       Mental Status Summary    Recent Changes in Mental Status/Cognitive Functioning no changes       Employment/    Employment Status retired    Current or Previous Occupation not applicable                   Psychosocial    No documentation.                   Abuse/Neglect    No documentation.                  Legal    No documentation.                  Substance Abuse    No documentation.                  Patient Forms    No documentation.                     Chely Almonte RN

## 2024-09-12 NOTE — THERAPY EVALUATION
Acute Care - Speech Language Pathology   Swallow Initial Evaluation [facility]  Clinical Swallow Evaluation       Patient Name: Omkar Nava  : 1955  MRN: [id number]  Today's Date: 2024               Admit Date: 2024    Visit Dx:     ICD-10-CM ICD-9-CM   1. Multifocal pneumonia  J18.9 486   2. COVID-19  U07.1 079.89   3. Hyperkalemia  E87.5 276.7   4. Acute anemia  D64.9 285.9   5. Moderate Alzheimer's dementia with psychotic disturbance, unspecified timing of dementia onset  G30.9 331.0    F02.B2 294.11   6. Oropharyngeal dysphagia  R13.12 787.22   7. 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, without long-term current use of insulin    Orthostatic hypotension    Acute osteomyelitis of left foot    Type 2 diabetes mellitus    Essential hypertension    Psychotic disorder    Neurocognitive disorder    S/P transmetatarsal amputation of foot, left    Hypoxia    Abscess of left foot    Anemia of chronic disease    Aspiration pneumonia    Cellulitis of left toe    Cellulitis of lower extremity    Cervical spine pain    Chronic kidney disease    Closed head injury without loss of consciousness    Dementia    Dental cavities    Diarrhea of presumed infectious origin    Displaced fracture of proximal phalanx of left great toe, initial encounter for open fracture    Dysphagia    Erectile dysfunction    Fall    Gas gangrene    Generalized muscle weakness    Hallucinations    Hypertensive heart and chronic kidney disease 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 failure, with stage 1 through stage 4 chronic kidney disease, or unspecified chronic kidney disease    Insomnia due to medical condition    Iron deficiency anemia    J15.0, ESBL Klebsiella pneumonia    Laceration without foreign body, left foot, subsequent encounter    Long  term (current) use of insulin    Other acute osteomyelitis, left ankle and foot    Personal history of Methicillin resistant Staphylococcus aureus infection    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    Protein calorie malnutrition    Simple chronic bronchitis    Tobacco use    Type 2 diabetes mellitus with diabetic neuropathy, unspecified    Wound infection, posttraumatic    Type 2 diabetes mellitus with diabetic chronic kidney disease    Encephalopathy    Bilateral pneumonia    Acute kidney injury superimposed on chronic kidney disease    Acute type 1 respiratory failure    COVID-19 virus detected     Past Medical History:   Diagnosis Date    Anemia     Dementia     Diabetes mellitus     Dysphagia     GERD (gastroesophageal reflux disease)     History of alcohol abuse     History of cocaine use     History of marijuana use     Hypertension     Osteomyelitis     Poor historian     records obtained from nursing home records & his family    Visual impairment      Past Surgical History:   Procedure Laterality Date    AMPUTATION FOOT Left 10/18/2022    Procedure: PARTIAL FIRST RAY AMPUTATION LEFT;  Surgeon: Yeison Petty MD;  Location: [facility] OR;  Service: Orthopedics;  Laterality: Left;    AMPUTATION FOOT Left 12/5/2022    Procedure: Transmetatarsal of Left Foot;  Surgeon: Yeison Petty MD;  Location:  [facility] OR;  Service: Orthopedics;  Laterality: Left;    ENDOSCOPY N/A 3/14/2024    Procedure: ESOPHAGOGASTRODUODENOSCOPY WITH JEJUNAL TUBE INSERTION AT BEDSIDE;  Surgeon: Brunner, Mark I, MD;  Location:  [facility];  Service: Gastroenterology;  Laterality: N/A;    ENDOSCOPY W/ PEG TUBE PLACEMENT N/A 4/1/2024    Procedure: ESOPHAGOGASTRODUODENOSCOPY WITH PERCUTANEOUS ENDOSCOPIC GASTROSTOMY TUBE INSERTION;  Surgeon: Brunner, Mark I, MD;  Location:  [facility];  Service: Gastroenterology;  Laterality: N/A;  EGD with PEG placement.  Secured at 3.5 cm    EYE SURGERY         SLP Recommendation and Plan  SLP Swallowing  "Diagnosis: suspected pharyngeal dysphagia (09/12/24 0845)  SLP Diet Recommendation: NPO, long term alternate methods of nutrition/hydration (until MBS) (09/12/24 0845)  Recommended Precautions and Strategies: general aspiration precautions (09/12/24 0845)  SLP Rec. for Method of Medication Administration: meds via alternate route (09/12/24 0845)     Monitor for Signs of Aspiration: yes, notify SLP if any concerns (09/12/24 0845)  Recommended Diagnostics: [place] [place] (09/12/24 0845)  Swallow Criteria for Skilled Therapeutic Interventions Met: demonstrates skilled criteria (09/12/24 0845)  Anticipated Discharge Disposition (SLP): skilled nursing facility (09/12/24 0845)  Rehab Potential/Prognosis, Swallowing: adequate, monitor progress closely (09/12/24 0845)        Oral Care Recommendations: Oral Care BID/PRN, Suction toothbrush (09/12/24 0845)                                               SWALLOW EVALUATION (Last 72 Hours)       SLP Adult Swallow Evaluation       Row Name 09/12/24 0845                   Rehab Evaluation    Document Type evaluation  -CJ        Subjective Information no complaints  -CJ        Patient Observations alert;cooperative  -CJ        Patient/Family/Caregiver Comments/Observations no family present; covid positive  -CJ        Patient Effort good  -CJ        Symptoms Noted During/After Treatment none  -CJ           General Information    Patient Profile Reviewed yes  -CJ        Pertinent History Of Current Problem Pt adm w/ hypoxia/covid; sig h/o DM2, HTN, aspiration PNA< dysphagia, neurocog disorder, CKD, osteomyelitis. Imaging revealed multifocal PNA. Was recommended NPO w/ PEG during last admission, was previously on nectar admission before that. Pt reported \"I drink nectar\"  -CJ        Current Method of Nutrition NPO;gastrostomy feedings  -CJ        Precautions/Limitations, Vision WFL;for purposes of eval  -CJ        Precautions/Limitations, Hearing WFL;for purposes of eval  -CJ        Prior " Level of Function-Communication cognitive-linguistic impairment  -CJ        Prior Level of Function-Swallowing NPO  -CJ        Plans/Goals Discussed with patient  -CJ        Barriers to Rehab medically complex;previous functional deficit;cognitive status  -        Patient's Goals for Discharge return to PO diet  -           Pain    Additional Documentation Pain Scale: FACES Pre/Post-Treatment (Group)  -           Pain Scale: FACES Pre/Post-Treatment    Pain: FACES Scale, Pretreatment 0-->no hurt  -CJ        Posttreatment Pain Rating 0-->no hurt  -CJ           Oral Motor Structure and Function    Dentition Assessment missing teeth  -        Secretion Management WNL/WFL  -        Mucosal Quality moist, healthy  -           Oral Musculature and Cranial Nerve Assessment    Oral Motor General Assessment generalized oral motor weakness  -           General Eating/Swallowing Observations    Respiratory Support Currently in Use nasal cannula  -        O2 Liters 3L  -CJ        Eating/Swallowing Skills fed by SLP  -        Positioning During Eating upright in bed  -        Utensils Used spoon;cup;straw  -CJ        Consistencies Trialed pureed;ice chips;thin liquids  -           Clinical Swallow Eval    Pharyngeal Phase suspected pharyngeal impairment  -           Pharyngeal Phase Concerns    Pharyngeal Phase Concerns cough  -CJ        Cough thin  -        Pharyngeal Phase Concerns, Comment Prev history of silent aspiration and PEG placement. Pt reports was eating/drinking at facility. Will plan to complete MBS w/ further recs pending. Continue NPO w/ PEG  -CJ           SLP Evaluation Clinical Impression    SLP Swallowing Diagnosis suspected pharyngeal dysphagia  -        Functional Impact risk of aspiration/pneumonia;risk of malnutrition;risk of dehydration  -        Rehab Potential/Prognosis, Swallowing adequate, monitor progress closely  -        Swallow Criteria for Skilled Therapeutic  Interventions Met demonstrates skilled criteria  -           Recommendations    SLP Diet Recommendation NPO;long term alternate methods of nutrition/hydration  until MBS  -        Recommended Diagnostics VFSS (MBS)  -        Recommended Precautions and Strategies general aspiration precautions  -        Oral Care Recommendations Oral Care BID/PRN;Suction toothbrush  -        SLP Rec. for Method of Medication Administration meds via alternate route  -        Monitor for Signs of Aspiration yes;notify SLP if any concerns  -        Anticipated Discharge Disposition (SLP) skilled nursing facility  -                  User Key  (r) = Recorded By, (t) = Taken By, (c) = Cosigned By      Initials Name Effective Dates     Eliz Cartagena MS CCC-SLP 06/03/24 -                     EDUCATION  The patient has been educated in the following areas:   Dysphagia (Swallowing Impairment) Oral Care/Hydration NPO rationale.                Time Calculation:    Time Calculation- SLP       Row Name 09/12/24 0917             Time Calculation- SLP    SLP Start Time 0845  -      SLP Received On 09/12/24  -         Untimed Charges    58874-GI Eval Oral Pharyng Swallow Minutes 40  -         Total Minutes    Untimed Charges Total Minutes 40  -       Total Minutes 40  -                User Key  (r) = Recorded By, (t) = Taken By, (c) = Cosigned By      Initials Name Provider Type     Eliz Cartagena MS CCC-SLP Speech and Language Pathologist                    Therapy Charges for Today       Code Description Service Date Service Provider Modifiers Qty    [id number] HC ST EVAL ORAL PHARYNG SWALLOW 3 9/12/2024 Eliz Cartagena MS CCC-SLP GN 1                 Eliz Cartagena MS CCC-SLP  9/12/2024

## 2024-09-12 NOTE — PROGRESS NOTES
[facility] Medicine Services  ADMISSION FOLLOW-UP NOTE          Patient admitted after midnight, H&P by my partner performed earlier on today's date reviewed.  Interim findings, labs, and charting also reviewed.        [facility] Problem List has been managed and updated to include any new diagnoses:  Active Hospital Problems    Diagnosis  POA    **Hypoxia [R09.02]  Yes    COVID-19 virus detected [U07.1]  Unknown    Bilateral pneumonia [J18.9]  Yes    Encephalopathy [G93.40]  Yes    Chronic kidney disease [N18.9]  Yes    Aspiration pneumonia [J69.0]  Yes    Dementia [F03.90]  Yes    Anemia of chronic disease [D63.8]  Yes    Neurocognitive disorder [R41.9]  Yes    Essential hypertension [I10]  Yes    Type 2 diabetes mellitus [E11.9]  Yes    Iron deficiency anemia [D50.9]  Yes      Resolved Hospital Problems   No resolved problems to display.         ADDITIONAL PLAN:  - detailed assessment and plan from admission reviewed    Acute hypoxia  Sepsis secondary to multifocal pneumonia  History of ESBL Klebsiella  COVID-19 positive  -Presented tachypneic, febrile, new oxygen requirement of 3 L.  COVID test positive day of admission.  Multifocal pneumonia on CT scan.  Chronic aspiration with PEG tube in place.  Suspect findings related to chronic aspiration and acute COVID, however, but note history of severe respiratory failure and ESBL klebsiella  -partner d/w Dr. Cj Cody last night possibility of nuzyga since h/o ESBL colonization  anc can't give merrem d/t depakote.  He states that we don't carry it though and the eravacycline may not penetrate lungs/more for abdominal infections  -ID/Dr. Gordon following today.  Who states that likely aspiration pna + possible secondary bacterial infection d/t COVID.  States that slightly increased risk but ESBL but more distant.  He recs cefepime and doxy for now.     -remdesivir + decadron day 2  -F/u blood and sputum cx, obtain induced  sputum if unable to cough up secretions, f/u pathogen specific antigens  -Aggressive bronchopulmonary hygiene, IS, OPEP, suctioning  -+MRSA swab     Acute on chronic anemia  -Baseline hemoglobin near 8.  He is 6.9 on presentation.  No signs of bleeding at this time.  MCV is elevated.  Fecal occult in ED is negative.  Suspect related to acute on chronic illness  - B12 and folate ok.  Iron studies c/w anemia of chronic disease  -S/p 1u PRBC on 9/11/24     Hyperkalemia  -K 6.4 on admission, received treatment in ED including Ca gluconate. Repeat K now     JAYY on CKD  -Cr 1.62 on admission but has fluctuated significantly over the last year. Last value 0.92 5 months ago. Will monitor output, hold nephrotoxic meds  -better with IVF     Chronic aspiration  -PEG tube in place  -hypoglycemic so will start TF's at low 10/hr for now.  Note is on glucerna with goal rate of 55 with 85cc fw/hr     HFpEF  HTN  -appears compensated at this time. Holding home coreg in favor of remdesivir given HR 60s, if stable can add back in. Holding torsemide, clonidine, terazosin, and hydralazine in setting of sepsis. Daily wts and I+O     T2DM with hypoglycemia  --restart TF's at low rate of 10mL/hour  --D5NS at 50/hr for now    Dementia with agitation  Seizure disorder?  -Given decreased responsiveness leading to initial presentation we will hold most of sedating meds for now.  Placed on depakote for agitation per neuro in past?  -Add back other sedatives as needed    Expected Discharge   Expected discharge date/ time has not been documented.     Yash Danielson MD  09/12/24

## 2024-09-12 NOTE — CONSULTS
[facility]    INFECTIOUS DISEASE CONSULT/INITIAL HOSPITAL VISIT    Omkar Nava  1955  [id number]    Date of consult: 9/12/2024    Admit date: 9/11/2024    Requesting Provider: No ref. provider found  Evaluating physician: Rayray Gordon MD  Reason for Consultation: Pneumonia, COVID-19  Chief Complaint: Above      Subjective   History of present illness:  Patient is a  69 y.o.  Yr old male with a history of dementia, diabetes mellitus type 2, blindness, essential hypertension, substance use in the past, nursing home resident at [facility], who was admitted to [facility] on 9/11/2024 with decreased mental status and acute hypoxic respiratory failure.  Radiographs revealed multifocal pneumonia consistent with possible aspiration and patient also tested positive for COVID-19.  Patient is a poor historian.  I was consulted on 9/12/2024 for further evaluation and treatment.    Past Medical History:   Diagnosis Date    Anemia     Dementia     Diabetes mellitus     Dysphagia     GERD (gastroesophageal reflux disease)     History of alcohol abuse     History of cocaine use     History of marijuana use     Hypertension     Osteomyelitis     Poor historian     records obtained from nursing home records & his family    Visual impairment        Past Surgical History:   Procedure Laterality Date    AMPUTATION FOOT Left 10/18/2022    Procedure: PARTIAL FIRST RAY AMPUTATION LEFT;  Surgeon: Yeison Petty MD;  Location: [facility] OR;  Service: Orthopedics;  Laterality: Left;    AMPUTATION FOOT Left 12/5/2022    Procedure: Transmetatarsal of Left Foot;  Surgeon: Yeison Petty MD;  Location:  [facility] OR;  Service: Orthopedics;  Laterality: Left;    ENDOSCOPY N/A 3/14/2024    Procedure: ESOPHAGOGASTRODUODENOSCOPY WITH JEJUNAL TUBE INSERTION AT BEDSIDE;  Surgeon: Brunner, Mark I, MD;  Location: [facility] ENDOSCOPY;  Service: Gastroenterology;  Laterality: N/A;    ENDOSCOPY W/ PEG  TUBE PLACEMENT N/A 4/1/2024    Procedure: ESOPHAGOGASTRODUODENOSCOPY WITH PERCUTANEOUS ENDOSCOPIC GASTROSTOMY TUBE INSERTION;  Surgeon: Brunner, Mark I, MD;  Location: [facility] ENDOSCOPY;  Service: Gastroenterology;  Laterality: N/A;  EGD with PEG placement.  Secured at 3.5 cm    EYE SURGERY         Pediatric History   Patient Parents    Not on file     Other Topics Concern    Not on file   Social History Narrative    Caffeine 0-1 servings per day    Patient lives at home .   Previous history of substance use including cocaine and alcohol, not currently, and no current history for cigarettes, nursing home resident    family history includes Diabetes in his brother, brother, father, maternal grandfather, maternal grandmother, mother, and sister; Hypertension in his brother, brother, father, and mother.    No Known Allergies    Immunization History   Administered Date(s) Administered    COVID-19 (MODERNA) 1st,2nd,3rd Dose Monovalent 06/09/2021, 07/19/2021    COVID-19 (MODERNA) Monovalent Original Booster 04/11/2022    COVID-19 F23 (MODERNA) 12YRS+ (SPIKEVAX) 10/16/2023    Fluzone (or Fluarix & Flulaval for VFC) >6mos 11/29/2023       Medication:  @Scheduled Meds:amLODIPine, 10 mg, Per G Tube, Q24H  [Held by provider] ARIPiprazole, 5 mg, Per G Tube, Daily  brimonidine, 1 drop, Both Eyes, TID  [Held by provider] carvedilol, 12.5 mg, Per G Tube, Q12H  [Held by provider] cloNIDine, 0.2 mg, Per G Tube, Q8H  dexAMETHasone, 6 mg, Per G Tube, Daily   Or  dexAMETHasone, 6 mg, Intravenous, Daily  eravacycline dihydrochloride (XERAVA) 80 mg in sodium chloride 0.9 % 250 mL (0.32 mg/mL) IVPB, 80 mg, Intravenous, Q12H  [Held by provider] hydrALAZINE, 100 mg, Per G Tube, Q8H  ipratropium-albuterol, 3 mL, Nebulization, Q6H - RT  lansoprazole, 30 mg, Per G Tube, Q AM  palliative care oral rinse, 5 mL, Mouth/Throat, 4x Daily  [Held by provider] ProSource No Carb, 30 mL, Per G Tube, BID  [Held by provider] QUEtiapine, 25 mg, Per G Tube,  "Nightly  [START ON 9/13/2024] remdesivir, 100 mg, Intravenous, Q24H  sodium chloride, 10 mL, Intravenous, Q12H  [Held by provider] terazosin, 5 mg, Per G Tube, Q12H  timolol, 1 drop, Both Eyes, BID  [Held by provider] torsemide, 5 mg, Per G Tube, Daily  Valproic Acid, 150 mg, Per G Tube, Q8H      Continuous Infusions:Pharmacy Consult - Remdesivir,   sodium chloride, 50 mL/hr, Last Rate: 50 mL/hr (09/12/24 0424)      PRN Meds:.  acetaminophen    senna-docusate sodium **AND** polyethylene glycol **AND** [DISCONTINUED] bisacodyl **AND** bisacodyl    Calcium Replacement - Follow Nurse / BPA Driven Protocol    [Held by provider] hydrOXYzine    Magnesium Standard Dose Replacement - Follow Nurse / BPA Driven Protocol    Pharmacy Consult - Remdesivir    Phosphorus Replacement - Follow Nurse / BPA Driven Protocol    Potassium Replacement - Follow Nurse / BPA Driven Protocol    sodium chloride    sodium chloride     Please refer to the medical record for a full medication list    Review of Systems: Difficult to obtain secondary to mental status      Physical Exam:   Vital Signs   Temp:  [98.4 °F (36.9 °C)-99.6 °F (37.6 °C)] 98.4 °F (36.9 °C)  Heart Rate:  [59-72] 67  Resp:  [16-20] 18  BP: (126-182)/(52-90) 152/68    Blood pressure 152/68, pulse 67, temperature 98.4 °F (36.9 °C), temperature source Oral, resp. rate 18, height 182.9 cm (72\"), weight 86.2 kg (190 lb 1.6 oz), SpO2 95%.  GENERAL: Awake and alert, in moderate distress. Appears older than stated age.  Resting in bed.  HEENT:  Normocephalic, atraumatic.  Oropharynx without thrush. Dentition in good repair. No cervical adenopathy. No neck masses.  Ears externally normal, Nose externally normal.  EYES: PERRLA. No conjunctival injection. No icterus. EOM full.  LYMPHATICS: No lymphadenopathy of the neck or axillary or inguinal regions.   HEART: No murmur, gallop, or pericardial friction rub. Reg rate rhythm, No JVD at 45 degrees.  LUNGS: Bilateral crackles. No " respiratory distress, no use of accessory muscles.  ABDOMEN: Soft, nontender, nondistended. No appreciable HSM.  Bowel sounds normal.  GENITAL: No external lesions, breasts without masses, back straight, no CVAT, rectal external without lesions.   SKIN: Warm and dry without cutaneous eruptions.  No nodules.    PSYCHIATRIC: Mental status with some confusion.  EXT:  No cellulitic change.  NEURO: Oriented to name, CN 2 to 12 intact, DTR 1 + and symmetric, sensory intact to LT upper and lower extremity, motor 5/5 upper and lower extremity, cerebellar and gait not tested.      Results Review:   I reviewed the patient's new clinical results.  I reviewed the patient's new imaging results and agree with the interpretation.  I reviewed the patient's other test results and agree with the interpretation    Results from last 7 days   Lab Units 09/12/24  0343 09/11/24  1816   WBC 10*3/mm3 6.57 6.37   HEMOGLOBIN g/dL 8.6* 6.9*   HEMATOCRIT % 26.9* 22.4*   PLATELETS 10*3/mm3 165 173     Results from last 7 days   Lab Units 09/12/24  0725 09/12/24  0342   SODIUM mmol/L  --  134*   POTASSIUM mmol/L 5.3* 6.3*   CHLORIDE mmol/L  --  104   CO2 mmol/L  --  19.0*   BUN mg/dL  --  34*   CREATININE mg/dL  --  1.16   GLUCOSE mg/dL  --  75   CALCIUM mg/dL  --  8.8     Results from last 7 days   Lab Units 09/12/24  0342   ALK PHOS U/L 91   BILIRUBIN mg/dL 0.2   ALT (SGPT) U/L 78*   AST (SGOT) U/L 37     Results from last 7 days   Lab Units 09/11/24  1816   SED RATE mm/hr 27*     Results from last 7 days   Lab Units 09/11/24  1816   CRP mg/dL 1.64*         Results from last 7 days   Lab Units 09/11/24  1816   LACTATE mmol/L 0.5     Estimated Creatinine Clearance: 75.3 mL/min (by C-G formula based on SCr of 1.16 mg/dL).  CPK          3/10/2024    17:26   Common Labs   Creatine Kinase 284       Procalitonin Results:      Lab 09/11/24  1816   PROCALCITONIN 0.11      Brief Urine Lab Results  (Last result in the past 365 days)        Color    Clarity   Blood   Leuk Est   Nitrite   Protein   CREAT   Urine HCG        09/11/24 1905 Yellow   Clear   Negative   Trace   Negative   Trace                  Site   Date Value Ref Range Status   09/11/2024 Right Radial  Final     Golden's Test   Date Value Ref Range Status   09/11/2024 N/A  Final     pH, Arterial   Date Value Ref Range Status   09/11/2024 7.369 7.350 - 7.450 pH units Final     pCO2, Arterial   Date Value Ref Range Status   09/11/2024 42.2 35.0 - 45.0 mm Hg Final     pO2, Arterial   Date Value Ref Range Status   09/11/2024 131.0 (H) 83.0 - 108.0 mm Hg Final     Comment:     83 Value above reference range     HCO3, Arterial   Date Value Ref Range Status   09/11/2024 24.3 20.0 - 26.0 mmol/L Final     Base Excess, Arterial   Date Value Ref Range Status   09/11/2024 -1.0 (L) 0.0 - 2.0 mmol/L Final     Hemoglobin, Blood Gas   Date Value Ref Range Status   09/11/2024 7.2 (L) 13.5 - 17.5 g/dL Final     Comment:     84 Value below reference range     Hematocrit, Blood Gas   Date Value Ref Range Status   09/11/2024 22.2 (L) 38.0 - 51.0 % Final     Oxyhemoglobin   Date Value Ref Range Status   09/11/2024 97.9 94 - 99 % Final     Methemoglobin   Date Value Ref Range Status   09/11/2024 0.40 0.00 - 1.50 % Final     Carboxyhemoglobin   Date Value Ref Range Status   09/11/2024 1.3 0 - 2 % Final     CO2 Content   Date Value Ref Range Status   09/11/2024 25.6 22 - 33 mmol/L Final     Barometric Pressure for Blood Gas   Date Value Ref Range Status   09/11/2024   Final     Comment:     N/A     Modality   Date Value Ref Range Status   09/11/2024 Room Air  Final     FIO2   Date Value Ref Range Status   09/11/2024 21 % Final        Microbiology:      Results for orders placed or performed during the hospital encounter of 03/10/24   Blood Culture - Blood, Arm, Right    Specimen: Arm, Right; Blood   Result Value Ref Range    Blood Culture Cutibacterium acnes (C)     Isolated from Anaerobic Bottle     Gram Stain Anaerobic  "Bottle Gram positive bacilli (C)               Brief Urine Lab Results  (Last result in the past 365 days)        Color   Clarity   Blood   Leuk Est   Nitrite   Protein   CREAT   Urine HCG        09/11/24 1905 Yellow   Clear   Negative   Trace   Negative   Trace                   No results found for: \"ACANTHNAEG\", \"AFBCX\", \"BPERTUSSISCX\", \"BLOODCX\"  No results found for: \"BCIDPCR\", \"CXREFLEX\", \"CSFCX\", \"CULTURETIS\"  No results found for: \"CULTURES\", \"HSVCX\", \"URCX\"  No results found for: \"EYECULTURE\", \"GCCX\", \"HSVCULTURE\", \"LABHSV\"  No results found for: \"LEGIONELLA\", \"MRSACX\", \"MUMPSCX\", \"MYCOPLASCX\"  No results found for: \"NOCARDIACX\", \"STOOLCX\"  No results found for: \"THROATCX\", \"UNSTIMCULT\", \"URINECX\", \"CULTURE\", \"VZVCULTUR\"  No results found for: \"VIRALCULTU\", \"WOUNDCX\"     No results found for: \"TISSCXQ\", \"CULTURES\", \"CULTURE\", \"BLOODCX\", \"ANACX\", \"BALCX\", \"ACIDFASTCX\", \"BODYFLDCX\", \"CXREFLEX\", \"FUNGUSCX\", \"RESPCX\", \"MRSACX\", \"ROUTCX\", \"BRCHWSHCLT\", \"TISSUECX\", \"URINECX\", \"CMVCX\", \"WOUNDCX\", \"BCIDPCR\", \"BFCULTURE\", \"BLOODCULT\"(      Radiology:  Imaging Results (Last 72 Hours)       Procedure Component Value Units Date/Time    CT Chest With Contrast Diagnostic [[id number]] Collected: 09/11/24 1942     Updated: 09/11/24 1953    Narrative:      CT CHEST W CONTRAST DIAGNOSTIC, CT ABDOMEN PELVIS W CONTRAST    Date of Exam: 9/11/2024 7:26 PM EDT    Indication: covid, fever.    Comparison: 3/14/2024    Technique: Axial CT images were obtained of the chest, abdomen and pelvis after the uneventful intravenous administration of 85 cc Isovue-300.  Reconstructed coronal and sagittal images were also obtained. Automated exposure control and iterative   construction methods were used.      Findings:  CT chest:  Lower Neck: Unremarkable.    Hilum and Mediastinum: Scattered prominent mediastinal and perihilar lymph nodes. No pathologically enlarged lymph nodes.  Mild cardiomegaly. Trace pericardial effusion. The esophagus is " mildly patulous. No definite suspicious mass.  The vasculature is grossly unremarkable.    Lung Parenchyma and Pleura: Patchy opacities scattered throughout the lungs, that are more consolidative in the lower lobes bilaterally. Trace bilateral pleural effusions. No pneumothorax.  The central airways are patent.    Soft tissues: Unremarkable.    Osseous structures: No acute osseous abnormality.. Mild cortical deformity of the superior endplate of T9 most likely represents a small Schmorl's node, unchanged.    CT abdomen and pelvis:  Liver: Liver is normal in size and CT density. No focal lesions.    Gallbladder: The gallbladder is normal without evidence of radiopaque stones.    Bile Ducts: No billiary dcutal dilation.    Spleen: Spleen is normal in size and CT density.    Pancreas: Pancreas is normal. There is no evidence of pancreatic mass or peripancreatic fluid.    Adrenals: Adrenal glands are unremarkable.    Kidneys: Kidneys are normal in size. There are no stones or hydronephrosis.    Gastrointestinal: Mild to moderate stool burden noted throughout the colon with a small to moderate-sized stool ball within the rectum. No acute inflammatory changes. No significant distention to suggest bowel obstruction. Mild diverticulosis of the   descending and sigmoid colon without evidence of acute diverticulitis. G-tube is in adequate position. Possible small lipoma within the greater curvature of the stomach.    Bladder: Circumferential bladder wall thickening    Pelvis:  No suspecious mass.    Peritoneum/Mesentery: No fluid collection, ascities, or free air.      Lymph Nodes: No lymphadenopathy.    Vasculature: Unremarkable    Abdominal Wall: Unremarkable    Bony Structures: No acute osseous abnormality. Unchanged grade 1 anterolisthesis of L4 and L5.          Impression:      Impression:  Findings consistent with multifocal pneumonia/pneumonitis, most significantly involving the lower lobes bilaterally.    No definite  acute intra-abdominal intrapelvic abnormality.    Mild circumferential bladder wall thickening is nonspecific but may represent cystitis. Please correlate with urinalysis.    Evidence of possible fecal impaction with associated mild constipation.      Electronically Signed: Ruben Medina DO    9/11/2024 7:50 PM EDT    Workstation ID: [id number]    CT Abdomen Pelvis With Contrast [[id number]] Collected: 09/11/24 1942     Updated: 09/11/24 1953    Narrative:      CT CHEST W CONTRAST DIAGNOSTIC, CT ABDOMEN PELVIS W CONTRAST    Date of Exam: 9/11/2024 7:26 PM EDT    Indication: covid, fever.    Comparison: 3/14/2024    Technique: Axial CT images were obtained of the chest, abdomen and pelvis after the uneventful intravenous administration of 85 cc Isovue-300.  Reconstructed coronal and sagittal images were also obtained. Automated exposure control and iterative   construction methods were used.      Findings:  CT chest:  Lower Neck: Unremarkable.    Hilum and Mediastinum: Scattered prominent mediastinal and perihilar lymph nodes. No pathologically enlarged lymph nodes.  Mild cardiomegaly. Trace pericardial effusion. The esophagus is mildly patulous. No definite suspicious mass.  The vasculature is grossly unremarkable.    Lung Parenchyma and Pleura: Patchy opacities scattered throughout the lungs, that are more consolidative in the lower lobes bilaterally. Trace bilateral pleural effusions. No pneumothorax.  The central airways are patent.    Soft tissues: Unremarkable.    Osseous structures: No acute osseous abnormality.. Mild cortical deformity of the superior endplate of T9 most likely represents a small Schmorl's node, unchanged.    CT abdomen and pelvis:  Liver: Liver is normal in size and CT density. No focal lesions.    Gallbladder: The gallbladder is normal without evidence of radiopaque stones.    Bile Ducts: No billiary dcutal dilation.    Spleen: Spleen is normal in size and CT density.    Pancreas:  Pancreas is normal. There is no evidence of pancreatic mass or peripancreatic fluid.    Adrenals: Adrenal glands are unremarkable.    Kidneys: Kidneys are normal in size. There are no stones or hydronephrosis.    Gastrointestinal: Mild to moderate stool burden noted throughout the colon with a small to moderate-sized stool ball within the rectum. No acute inflammatory changes. No significant distention to suggest bowel obstruction. Mild diverticulosis of the   descending and sigmoid colon without evidence of acute diverticulitis. G-tube is in adequate position. Possible small lipoma within the greater curvature of the stomach.    Bladder: Circumferential bladder wall thickening    Pelvis:  No suspecious mass.    Peritoneum/Mesentery: No fluid collection, ascities, or free air.      Lymph Nodes: No lymphadenopathy.    Vasculature: Unremarkable    Abdominal Wall: Unremarkable    Bony Structures: No acute osseous abnormality. Unchanged grade 1 anterolisthesis of L4 and L5.          Impression:      Impression:  Findings consistent with multifocal pneumonia/pneumonitis, most significantly involving the lower lobes bilaterally.    No definite acute intra-abdominal intrapelvic abnormality.    Mild circumferential bladder wall thickening is nonspecific but may represent cystitis. Please correlate with urinalysis.    Evidence of possible fecal impaction with associated mild constipation.      Electronically Signed: Ruben Medina DO    9/11/2024 7:50 PM EDT    Workstation ID: [id number]    CT Head Without Contrast [[id number]] Collected: 09/11/24 1940     Updated: 09/11/24 1945    Narrative:      CT HEAD WO CONTRAST    Date of Exam: 9/11/2024 7:26 PM EDT    Indication: possible sz.    Comparison: 3/11/2024    Technique: Axial CT images were obtained of the head without contrast administration.  Automated exposure control and iterative construction methods were used.      Findings:  There is no evidence of hemorrhage. There  is no mass effect or midline shift. Diffuse brain atrophy with chronic microvascular ischemic changes    There is no extracerebral collection.    Ventricles are normal in size and configuration for patient's stated age.     Posterior fossa is within normal limits.    Calvarium and skull base appear intact. Mucosal thickening in the bilateral maxillary sinuses and ethmoids. Small air-fluid level left sphenoid. Stable chronic findings with possible surgical change in the right globe.        Impression:      Impression:  No acute intracranial abnormality        Electronically Signed: Aramis Clark MD    9/11/2024 7:42 PM EDT    Workstation ID: [id number]            IMPRESSION:     Pneumonia, likely aspiration plus possible secondary bacterial infection from COVID-19.  COVID-19 tested + 9/11/2024.  Organisms to consider for secondary bacterial infection include gram-negative rods, Streptococcus, Staphylococcus.  Does have a slightly increased risk for infection with ESBL organism but this was more distant.  Less likely atypical Legionella.  COVID-19 infection + 9/11/2024.  Encephalopathy, toxic and metabolic related to above issues.  Acute hypoxic respiratory failure, was on 3 L/min nasal cannula on 9/12.  Dementia, affecting all aspects of care.  Diabetes mellitus type 2 with increased risk for infection.  Blindness, affecting all aspects of care.  History of substance use with cocaine and alcohol in the past.  Anemia, chronic disease and related to above issues.  Hyperkalemia 6.3.  Hyponatremia 134.  Elevated ALT 78 on 9/12.  Related to above issues and medications.  Fever related to above issues.  Urinalysis benign from 9/11.    RECOMMENDATIONS:    Diagnostically, continue to follow patient's physical exam, CBC, CMP, CRP, cultures which have been obtained, sooner if available.  Radiographic studies as needed.  Therapeutically, consider using cefepime and doxycycline pending further culture data.  Discontinue  eravacycline.  Cefepime can be associated with altered mental status in patients with renal failure and will need to be observed closely.  Duration of antibiotics to be determined.  Continue remdesivir for up to 5 days depending upon clinical course.  Continue Decadron for 5 days depending upon clinical course.  Supportive care.  3 L/min nasal cannula oxygen on 9/12/2024.    I discussed the patient's findings and my recommendations with patient and nursing staff    Thank you for asking me to see Omkar Nava.  Our group would be pleased to follow this patient over the course of their hospitalization and assist with outpatient antimicrobial therapy, as indicated.  Further recommendations depend on the results of the cultures and clinical course.  Side effects of medications discussed.  The patient is at increased risk for adverse drug reactions, complications of IV access, and readmission.    Rayray Gordon MD  9/12/2024

## 2024-09-12 NOTE — PROGRESS NOTES
Clinical Nutrition     Patient Name: Omkar Nava  YOB: 1955  MRN: [id number]  Date of Encounter: 09/12/24 11:57 EDT  Admission date: 9/11/2024  Reason for Visit: Identified at risk by screening criteria, Physician consult, EN    Assessment   Nutrition Assessment   Admission Diagnosis:  Hypoxia [R09.02]    Problem List:    Hypoxia    Type 2 diabetes mellitus    Essential hypertension    Neurocognitive disorder    Anemia of chronic disease    Aspiration pneumonia    Chronic kidney disease    Dementia    Iron deficiency anemia    Encephalopathy    Bilateral pneumonia    COVID-19 virus detected      PMH:   He  has a past medical history of Anemia, Dementia, Diabetes mellitus, Dysphagia, GERD (gastroesophageal reflux disease), History of alcohol abuse, History of cocaine use, History of marijuana use, Hypertension, Osteomyelitis, Poor historian, and Visual impairment.    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    Substance history: Etoh abuse, cocaine, tobacco    SKIN: bruised, L foot s/p metatarsal amputations 2022    Labs    Labs Reviewed: Yes    Results from last 7 days   Lab Units 09/12/24  0725 09/12/24  0342 09/11/24  1816   GLUCOSE mg/dL  --  75 87   BUN mg/dL  --  34* 40*   CREATININE mg/dL  --  1.16 1.62*   SODIUM mmol/L  --  134* 132*   CHLORIDE mmol/L  --  104 101   POTASSIUM mmol/L 5.3* 6.3* 6.4*   MAGNESIUM mg/dL  --  1.9 2.2   ALT (SGPT) U/L  --  78* 85*   LACTATE mmol/L  --   --  0.5       Results from last 7 days   Lab Units 09/12/24  0342 09/11/24  1816   ALBUMIN g/dL 3.3* 3.5   CRP mg/dL  --  1.64*       Results from last 7 days   Lab Units 09/12/24  1118 09/12/24  1026 09/12/24  0924 09/12/24  0715 09/12/24  0559 09/12/24  0137   GLUCOSE mg/dL 120 149* 54* 116 82 85     Lab  Results   Lab Value Date/Time    HGBA1C 7.00 (H) 10/16/2022 0432    HGBA1C 8.7 (H) 06/25/2022 0242    HGBA1C 13.4 04/14/2022 1058               Medications    Medications Reviewed: yes    Scheduled Meds:amLODIPine, 10 mg, Per G Tube, Q24H  [Held by provider] ARIPiprazole, 5 mg, Per G Tube, Daily  brimonidine, 1 drop, Both Eyes, TID  [Held by provider] carvedilol, 12.5 mg, Per G Tube, Q12H  cefepime, 2,000 mg, Intravenous, Q12H  [Held by provider] cloNIDine, 0.2 mg, Per G Tube, Q8H  dexAMETHasone, 6 mg, Per G Tube, Daily   Or  dexAMETHasone, 6 mg, Intravenous, Daily  doxycycline, 100 mg, Per G Tube, Q12H  [Held by provider] hydrALAZINE, 100 mg, Per G Tube, Q8H  ipratropium-albuterol, 3 mL, Nebulization, Q6H - RT  lansoprazole, 30 mg, Per G Tube, Q AM  palliative care oral rinse, 5 mL, Mouth/Throat, 4x Daily  [Held by provider] QUEtiapine, 25 mg, Per G Tube, Nightly  [START ON 9/13/2024] remdesivir, 100 mg, Intravenous, Q24H  sodium chloride, 10 mL, Intravenous, Q12H  [Held by provider] terazosin, 5 mg, Per G Tube, Q12H  timolol, 1 drop, Both Eyes, BID  [Held by provider] torsemide, 5 mg, Per G Tube, Daily  Valproic Acid, 150 mg, Per G Tube, Q8H      Continuous Infusions:dextrose, 50 mL/hr, Last Rate: 50 mL/hr (09/12/24 1048)  Pharmacy Consult - Remdesivir,   sodium chloride, 50 mL/hr, Last Rate: 50 mL/hr (09/12/24 0424)      PRN Meds:.  acetaminophen    senna-docusate sodium **AND** polyethylene glycol **AND** [DISCONTINUED] bisacodyl **AND** bisacodyl    Calcium Replacement - Follow Nurse / BPA Driven Protocol    [Held by provider] hydrOXYzine    Magnesium Standard Dose Replacement - Follow Nurse / BPA Driven Protocol    Pharmacy Consult - Remdesivir    Phosphorus Replacement - Follow Nurse / BPA Driven Protocol    Potassium Replacement - Follow Nurse / BPA Driven Protocol    sodium chloride    sodium chloride    Intake/Ouptut 24 hrs (0701 - 0700)   I&O's Reviewed: yes    Intake & Output (last day)         09/11  "0701  09/12 0700 09/12 0701 09/13 0700    Blood 388.8     IV Piggyback 1740     Total Intake(mL/kg) 2128.8 (24.7)     Urine (mL/kg/hr)  600 (1.4)    Total Output  600    Net +2128.8 -600                   Anthropometrics     Height: Height: 182.9 cm (72\")  Last Filed Weight: Weight: 86.2 kg (190 lb 1.6 oz) (09/12/24 0118)  Method: Weight Method: Bed scale  BMI: BMI (Calculated): 25.8    UBW: per [facility] Alexandra pt weighed 186.2lb 9-11-24/ BMI 25    Weight change:  alternating wt's suspect 2nd fluid      Weight      Weight (kg) Weight (lbs) Weight Method   1/18/2024 85.548 kg  188 lb 9.6 oz  Bed scale    1/19/2024 82.691 kg  182 lb 4.8 oz  Bed scale    1/20/2024 83.054 kg  183 lb 1.6 oz  Bed scale    1/21/2024 85.14 kg  187 lb 11.2 oz  Bed scale    2/5/2024 85.1 kg  187 lb 9.8 oz  Estimated    2/22/2024 84.823 kg  187 lb     3/10/2024 84.823 kg  187 lb  Stated     98.1 kg  216 lb 4.3 oz  Bed scale    3/11/2024 90 kg  198 lb 6.6 oz  Bed scale    3/12/2024 93.7 kg  206 lb 9.1 oz  Bed scale    3/13/2024 92.8 kg  204 lb 9.4 oz  Bed scale    3/15/2024 92 kg  202 lb 13.2 oz     3/16/2024 91.1 kg  200 lb 13.4 oz     3/17/2024 98.8 kg  217 lb 13 oz     3/20/2024 94.6 kg  208 lb 8.9 oz  Bed scale    3/21/2024 99 kg  218 lb 4.1 oz     3/22/2024 98.2 kg  216 lb 7.9 oz     3/25/2024 93 kg  205 lb 0.4 oz  Bed scale    3/26/2024 91.2 kg  201 lb 1 oz     3/27/2024 92 kg  202 lb 13.2 oz     3/28/2024 91.5 kg  201 lb 11.5 oz     3/29/2024 91 kg  200 lb 9.9 oz     3/31/2024 90.8 kg  200 lb 2.8 oz  Bed scale    4/1/2024 90.7 kg  199 lb 15.3 oz  Bed scale    4/2/2024 92.1 kg  203 lb 0.7 oz  Bed scale    5/28/2024 79.379 kg  175 lb     5/30/2024 79.379 kg  175 lb     6/6/2024 79.379 kg  175 lb     9/11/2024 79.4 kg  175 lb 0.7 oz  Estimated    9/12/2024 86.229 kg  190 lb 1.6 oz  Bed scale        Nutrition Focused Physical Exam     Date:     Unable to perform due to COVID Isolation      Subjective   Reported/Observed/Food/Nutrition Related " "History:     Pt sleeping in bed, on nasal cannula    Per RN: pt was combative last night, still very confused, has had 600ml UOP today, D5% @50ml started for hyperglycemia      Needs Assessment   Date: 24    Height used:Height: 182.9 cm (72\")  Weights used: 186.2lb/ 85kg    Estimated Calorie needs: ~ 1900kcal  Method:  20-25Kcals/K-2125kcal  Method:  MSJ x 1.2: 1996kcal      Estimated Protein needs: ~85g protein  Method:0.8-1.2g protein per k-102g protein    Current Nutrition Prescription     PO: NPO Diet NPO Type: Strict NPO  Oral Nutrition Supplement:  Intake: N/A    Assessment & Plan   Nutrition Diagnosis   Date: 24 Updated:   Problem Inadequate energy intake    Etiology ARF/Covid 19   Signs/Symptoms NPO   Status: New  Goal:   Nutrition to support treatment and Initiate EN      Nutrition Intervention      Follow treatment progress, Care plan reviewed, Nutrition support order placed    Pt hyperkalemic, K+ 6.2 this am, will need renal formula until hyperkalemia resolved    Start Novasource Renal at 10ml via PEG, advance gradually as tolerated to goal rate @45ml/hr, free water @15ml/hr, while on IVF's    TF at goal volume, based on 22 hour feed,  will provide: 990ml, 1980kcal, 104% kcal needs,  90g protein, 106% protein needs, 1043ml free water including flush    Suggest add MVI as TF volume too low to meet RDI's    Monitoring/Evaluation:   Per protocol, I&O, Supplement intake, Pertinent labs, Weight, Skin status, GI status, Symptoms    Sonia Peraza, JAGDEEP  Time Spent: 60min  "

## 2024-09-12 NOTE — PLAN OF CARE
Problem: Skin Injury Risk Increased  Goal: Skin Health and Integrity  9/12/2024 0411 by Astrid Lane RN  Outcome: Ongoing, Progressing  9/12/2024 0409 by Astrid Lane RN  Outcome: Ongoing, Progressing  Intervention: Optimize Skin Protection  Description: Perform a full pressure injury risk assessment, as indicated by screening, upon admission to care unit.  Reassess skin (injury risk, full inspection) frequently (e.g., scheduled interval, with change in condition) to provide optimal early detection and prevention.  Maintain adequate tissue perfusion (e.g., encourage fluid balance; avoid crossing legs, constrictive clothing or devices) to promote tissue oxygenation.  Maintain head of bed at lowest degree of elevation tolerated, considering medical condition and other restrictions.  Avoid positioning onto an area that remains reddened.  Minimize incontinence and moisture (e.g., toileting schedule; moisture-wicking pad, diaper or incontinence collection device; skin moisture barrier).  Cleanse skin promptly and gently when soiled utilizing a pH-balanced cleanser.  Relieve and redistribute pressure (e.g., scheduled position changes, weight shifts, use of support surface, medical device repositioning, protective dressing application, use of positioning device, microclimate control, use of pressure-injury-monitor  Encourage increased activity, such as sitting in a chair at the bedside or early mobilization, when able to tolerate.     Problem: Fall Injury Risk  Goal: Absence of Fall and Fall-Related Injury  9/12/2024 0411 by Astrid Lane RN  Outcome: Ongoing, Progressing  9/12/2024 0409 by Astrid Lane RN  Outcome: Ongoing, Progressing  Intervention: Identify and Manage Contributors  Description: Develop a fall prevention plan with the patient and caregiver/family.  Provide reorientation, appropriate sensory stimulation and routines with changes in mental status to decrease risk of fall.  Promote  use of personal vision and auditory aids.  Assess assistance level required for safe and effective self-care; provide support as needed, such as toileting, mobilization. For age 65 and older, implement timed toileting with assistance.  Encourage physical activity, such as performance of mobility and self-care at highest level of patient ability, multicomponent exercise program and provision of appropriate assistive devices.  If fall occurs, assess the severity of injury; implement fall injury protocol. Determine the cause and revise fall injury prevention plan.  Regularly review medication contribution to fall risk; adjust medication administration times to minimize risk of falling.  Consider risk related to polypharmacy and age.  Balance adequate pain management with potential for oversedation.  Intervention: Promote Injury-Free Environment  Description: Provide a safe, barrier-free environment that encourages independent activity.  Keep care area uncluttered and well-lighted.  Determine need for increased observation or monitoring.  Avoid use of devices that minimize mobility, such as restraints or indwelling urinary catheter.     Problem: Diabetes Comorbidity  Goal: Blood Glucose Level Within Targeted Range  9/12/2024 0411 by Astrid Lane, COREEN  Outcome: Ongoing, Progressing  9/12/2024 0409 by Astrid Lane, COREEN  Outcome: Ongoing, Progressing     Problem: Hypertension Comorbidity  Goal: Blood Pressure in Desired Range  9/12/2024 0411 by Astrid Lane, COREEN  Outcome: Ongoing, Progressing  9/12/2024 0409 by Astrid Lane, COREEN  Outcome: Ongoing, Progressing  Intervention: Maintain Blood Pressure Management  Description: Evaluate adherence to home antihypertensive regimen (e.g., exercise and activity, diet modification, medication).  Provide scheduled antihypertensive medication; consider administration time and effects (e.g., avoid giving diuretic prior to bedtime).  Monitor response to antihypertensive  medication therapy (e.g., blood pressure, electrolyte levels, medication effects).  Minimize risk of orthostatic hypotension; encourage caution with position changes, particularly if elderly.     Problem: Infection (Pneumonia)  Goal: Resolution of Infection Signs and Symptoms  Outcome: Ongoing, Progressing     Problem: Respiratory Compromise (Pneumonia)  Goal: Effective Oxygenation and Ventilation  Outcome: Ongoing, Progressing  Intervention: Promote Airway Secretion Clearance  Description: Assess the effectiveness of pulmonary hygiene and ability to perform airway clearance techniques.  Promote early mobility or ambulation; match activity to ability and tolerance.  Encourage deep breathing and lung expansion therapy to prevent atelectasis; adjust treatment to patient’s response.  Anticipate the need to splint chest or abdominal wall with cough to minimize discomfort; assist if needed.  Initiate cough-enhancement and airway-clearance techniques with instruction.  Consider inhaled pharmacologic therapy (e.g., beta-2 agonist, mucolytic, corticosteroid, antimicrobial) to improve mucus clearance, inflammation, cough response and air flow.  Intervention: Optimize Oxygenation and Ventilation  Description: Assess and monitor airway, breathing and circulation for effective oxygenation and ventilation; consider oxygenation and ventilation parameters and goal.  Anticipate noninvasive and invasive monitoring (e.g., pulse oximetry, end-tidal carbon dioxide, blood gases, cardiovascular).  Maintain optimal position to relieve discomfort, breathlessness and ventilation-perfusion mismatch.  Provide oxygen therapy judiciously to avoid hyperoxemia; adjust to achieve oxygenation goal.  Monitor fluid balance closely to minimize the risk of fluid overload.  Consider noninvasive or invasive positive pressure ventilation to enhance oxygenation and ventilation, as well as reduce work of breathing.   Goal Outcome Evaluation:

## 2024-09-12 NOTE — PLAN OF CARE
Problem: Adult Inpatient Plan of Care  Goal: Plan of Care Review  Outcome: Ongoing, Progressing  Goal: Patient-Specific Goal (Individualized)  Outcome: Ongoing, Progressing  Goal: Absence of Hospital-Acquired Illness or Injury  Outcome: Ongoing, Progressing  Intervention: Identify and Manage Fall Risk  Recent Flowsheet Documentation  Taken 9/12/2024 1615 by Denice Gates RN  Safety Promotion/Fall Prevention:   safety round/check completed   assistive device/personal items within reach   fall prevention program maintained  Taken 9/12/2024 1430 by Denice Gates RN  Safety Promotion/Fall Prevention:   activity supervised   room organization consistent   safety round/check completed  Taken 9/12/2024 1226 by Denice Gates RN  Safety Promotion/Fall Prevention:   safety round/check completed   assistive device/personal items within reach   clutter free environment maintained  Taken 9/12/2024 1000 by Denice Gates RN  Safety Promotion/Fall Prevention:   activity supervised   safety round/check completed  Taken 9/12/2024 0829 by Denice Gates RN  Safety Promotion/Fall Prevention:   activity supervised   safety round/check completed  Intervention: Prevent Skin Injury  Recent Flowsheet Documentation  Taken 9/12/2024 1615 by Denice Gates RN  Body Position:   tilted   right  Taken 9/12/2024 1430 by Denice Gates RN  Body Position:   turned   right   left   supine, legs elevated  Taken 9/12/2024 1226 by Denice Gates RN  Body Position: patient/family refused  Taken 9/12/2024 1000 by Denice Gates RN  Body Position: supine, legs elevated  Taken 9/12/2024 0829 by Denice Gates RN  Body Position:   supine, legs elevated   patient/family refused  Skin Protection:   adhesive use limited   incontinence pads utilized  Intervention: Prevent and Manage VTE (Venous Thromboembolism) Risk  Recent Flowsheet Documentation  Taken 9/12/2024  1615 by Denice Gates RN  Activity Management: activity encouraged  Taken 9/12/2024 1430 by Denice Gates RN  Activity Management: activity encouraged  Taken 9/12/2024 1226 by Denice Gates RN  Activity Management: activity encouraged  Taken 9/12/2024 1000 by Denice Gates RN  Activity Management: activity encouraged  Taken 9/12/2024 0829 by Denice Gates RN  Activity Management: activity encouraged  VTE Prevention/Management:   bilateral   sequential compression devices on  Intervention: Prevent Infection  Recent Flowsheet Documentation  Taken 9/12/2024 0829 by Denice Gates RN  Infection Prevention: environmental surveillance performed  Goal: Optimal Comfort and Wellbeing  Outcome: Ongoing, Progressing  Goal: Readiness for Transition of Care  Outcome: Ongoing, Progressing     Problem: Skin Injury Risk Increased  Goal: Skin Health and Integrity  Outcome: Ongoing, Progressing  Intervention: Optimize Skin Protection  Recent Flowsheet Documentation  Taken 9/12/2024 1615 by Denice Gates RN  Head of Bed (HOB) Positioning: HOB elevated  Taken 9/12/2024 1430 by Denice Gates RN  Head of Bed (HOB) Positioning: HOB elevated  Taken 9/12/2024 1226 by Denice Gates RN  Head of Bed (HOB) Positioning: HOB elevated  Taken 9/12/2024 0829 by Denice Gates RN  Pressure Reduction Techniques:   weight shift assistance provided   heels elevated off bed  Head of Bed (HOB) Positioning: HOB elevated  Pressure Reduction Devices: pressure-redistributing mattress utilized  Skin Protection:   adhesive use limited   incontinence pads utilized     Problem: Fall Injury Risk  Goal: Absence of Fall and Fall-Related Injury  Outcome: Ongoing, Progressing  Intervention: Promote Injury-Free Environment  Recent Flowsheet Documentation  Taken 9/12/2024 1615 by Denice Gates RN  Safety Promotion/Fall Prevention:   safety round/check completed    assistive device/personal items within reach   fall prevention program maintained  Taken 9/12/2024 1430 by Denice Gates RN  Safety Promotion/Fall Prevention:   activity supervised   room organization consistent   safety round/check completed  Taken 9/12/2024 1226 by Denice Gates RN  Safety Promotion/Fall Prevention:   safety round/check completed   assistive device/personal items within reach   clutter free environment maintained  Taken 9/12/2024 1000 by Denice Gates RN  Safety Promotion/Fall Prevention:   activity supervised   safety round/check completed  Taken 9/12/2024 0829 by Denice Gates RN  Safety Promotion/Fall Prevention:   activity supervised   safety round/check completed     Problem: COPD (Chronic Obstructive Pulmonary Disease) Comorbidity  Goal: Maintenance of COPD Symptom Control  Outcome: Ongoing, Progressing     Problem: Diabetes Comorbidity  Goal: Blood Glucose Level Within Targeted Range  Outcome: Ongoing, Progressing     Problem: Hypertension Comorbidity  Goal: Blood Pressure in Desired Range  Outcome: Ongoing, Progressing     Problem: Fluid Imbalance (Pneumonia)  Goal: Fluid Balance  Outcome: Ongoing, Progressing     Problem: Infection (Pneumonia)  Goal: Resolution of Infection Signs and Symptoms  Outcome: Ongoing, Progressing     Problem: Respiratory Compromise (Pneumonia)  Goal: Effective Oxygenation and Ventilation  Outcome: Ongoing, Progressing  Intervention: Promote Airway Secretion Clearance  Recent Flowsheet Documentation  Taken 9/12/2024 0829 by Denice Gates RN  Cough And Deep Breathing: done with encouragement  Intervention: Optimize Oxygenation and Ventilation  Recent Flowsheet Documentation  Taken 9/12/2024 1615 by Denice Gates RN  Head of Bed (HOB) Positioning: HOB elevated  Taken 9/12/2024 1430 by Denice Gates RN  Head of Bed (HOB) Positioning: HOB elevated  Taken 9/12/2024 1226 by Denice Gates  RN  Head of Bed (HOB) Positioning: HOB elevated  Taken 9/12/2024 0829 by Denice Gates, RN  Head of Bed ([facility]) Positioning: HOB elevated   Goal Outcome Evaluation:

## 2024-09-12 NOTE — PROGRESS NOTES
"Pharmacy Consult-Vancomycin Dosing  Omkar Nava is a  69 y.o. male receiving vancomycin therapy.     Indication: pneumonia  Consulting Provider: hospitalist  ID Consult: NO    Goal AUC: 400 - 600 mg/L*hr    Current Antimicrobial Therapy  Anti-Infectives (From admission, onward)      Ordered     Dose/Rate Route Frequency Start Stop    09/12/24 0014  vancomycin (VANCOCIN) 1,000 mg in sodium chloride 0.9 % 250 mL IVPB-VTB        Ordering Provider: Kings Jc, PharmD   Placed in \"And\" Linked Group    12.6 mg/kg × 79.4 kg  250 mL/hr over 60 Minutes Intravenous Every 24 Hours Scheduled 09/12/24 2200 09/18/24 2059 09/11/24 2354  doxycycline (VIBRAMYCIN) 100 mg in sodium chloride 0.9 % 100 mL MBP        Ordering Provider: Alberto Oh MD    100 mg  over 60 Minutes Intravenous Every 12 Hours Scheduled 09/12/24 1000 09/17/24 0859    09/11/24 2354  Pharmacy to dose vancomycin        Ordering Provider: Alberto Oh MD     Does not apply Continuous PRN 09/11/24 2350 09/18/24 2349 09/11/24 2058  vancomycin IVPB 1750 mg in 0.9% Sodium Chloride (premix) 500 mL        Ordering Provider: Clive Atkins Jr., PA-C    22 mg/kg × 79.4 kg  285.7 mL/hr over 105 Minutes Intravenous Once 09/11/24 2130 09/11/24 2043  cefTRIAXone (ROCEPHIN) 2,000 mg in sodium chloride 0.9 % 100 mL MBP        Ordering Provider: Clive Atkins Jr., PA-C    2,000 mg  200 mL/hr over 30 Minutes Intravenous Once 09/11/24 2059 09/11/24 2219 09/11/24 2043  doxycycline (VIBRAMYCIN) 100 mg in sodium chloride 0.9 % 100 mL MBP        Ordering Provider: Clive Atkins Jr., PA-C    100 mg  over 60 Minutes Intravenous Once 09/11/24 2059 09/11/24 2249            Allergies  Allergies as of 09/11/2024    (No Known Allergies)       Labs  Results from last 7 days   Lab Units 09/11/24  1816   BUN mg/dL 40*   CREATININE mg/dL 1.62*     Results from last 7 days   Lab Units 09/11/24  1816   WBC 10*3/mm3 6.37       Evaluation of " "Dosing  Last Dose Received in the ED/Outside Facility:               Vancomycin 1750 mg IV 9/11 at 23:13  Is Patient on Dialysis or Renal Replacement:               No    Ht - 182.9 cm (72\")  Wt - 79.4 kg (175 lb 0.7 oz)    Estimated Creatinine Clearance: 49.7 mL/min (A) (by C-G formula based on SCr of 1.62 mg/dL (H)).  Intake & Output (last 3 days)         09/09 0701  09/10 0700 09/10 0701 09/11 0700 09/11 0701 09/12 0700    IV Piggyback   1200    Total Intake(mL/kg)   1200 (15.1)    Net   +1200                   Microbiology and Radiology  Microbiology Results (last 10 days)       Procedure Component Value - Date/Time    Respiratory Panel PCR w/COVID-19(SARS-CoV-2) GIANNI/SIENNA/ANNA/PAD/COR/ASHIA In-House, NP Swab in UTM/VTM, 2 HR TAT - Swab, Nasopharynx [[id number]]  (Abnormal) Collected: 09/11/24 1851    Lab Status: Final result Specimen: Swab from Nasopharynx Updated: 09/11/24 2000     ADENOVIRUS, PCR Not Detected     Coronavirus 229E Not Detected     Coronavirus HKU1 Not Detected     Coronavirus NL63 Not Detected     Coronavirus OC43 Not Detected     COVID19 Detected     Human Metapneumovirus Not Detected     Human Rhinovirus/Enterovirus Not Detected     Influenza A PCR Not Detected     Influenza B PCR Not Detected     Parainfluenza Virus 1 Not Detected     Parainfluenza Virus 2 Not Detected     Parainfluenza Virus 3 Not Detected     Parainfluenza Virus 4 Not Detected     RSV, PCR Not Detected     Bordetella pertussis pcr Not Detected     Bordetella parapertussis PCR Not Detected     Chlamydophila pneumoniae PCR Not Detected     Mycoplasma pneumo by PCR Not Detected    Narrative:      In the setting of a positive respiratory panel with a viral infection PLUS a negative procalcitonin without other underlying concern for bacterial infection, consider observing off antibiotics or discontinuation of antibiotics and continue supportive care. If the respiratory panel is positive for atypical bacterial infection (Bordetella " pertussis, Chlamydophila pneumoniae, or Mycoplasma pneumoniae), consider antibiotic de-escalation to target atypical bacterial infection.            Reported Vancomycin Levels                   Insight[facility] AUC Calculation:  Current AUC:   -- mg/L*hr    Predicted Steady State AUC on Current Dose: -- mg/L*hr  _________________________________  Predicted Steady State AUC on New Dose:   445 mg/L*hr    Assessment/Plan:  1. Will give vancomycin 1750 mg IV once (given in ED 9/11 at 23:13)                               followed by      Vancomycin 1000 mg IV q 24 hours    2. Vancomycin trough is scheduled 9/13 at 19:00 prior to the 3rd dose. Predicted Steady State AUC at current dose: 445 mg/L*hr.      Kings Jc, RenateD, BCPS  9/12/2024  00:15 EDT

## 2024-09-12 NOTE — H&P
[facility] Medicine Services  HISTORY AND PHYSICAL    Patient Name: Omkar Nava  : 1955  MRN: [id number]  Primary Care Physician: Alberto Dye MD  Date of admission: 2024      Subjective   Subjective     Chief Complaint:  Decreased mental status    HPI:  Omkar Nava is a 69 y.o. male with PMH diabetes mellitus, complicated by blindness, osteomyelitis, CKD, early onset dementia with psychosis, decreased mobility, heart failure with preserved EF. He presents from [facility] for decreased mental status.    Admitted 3/10-24 for hypoxic respiratory failure, multifocal pneumonia, recurrent aspiration. Required intubation in ICU at that time. Respiratory culture grew ESBL klebsiella and he was treated with IV ertapenem x 10 days. Notably he did have a PEG tube placed at the time for recurrent aspiration. He was discharged to [facility].     He presents with one day of worsening mental status, fever. Normally can respond with one-two word answers to questions and is more alert, but today he was very drowsy and required physical stimulus to wake up. Also noted to be febrile. He was tested positive for COVID day of admission. He is presently more alert and can give one word answers to questions without any verbal prompting. History is limited by mental status.      Personal History     Past Medical History:   Diagnosis Date    Anemia     Dementia     Diabetes mellitus     Dysphagia     GERD (gastroesophageal reflux disease)     History of alcohol abuse     History of cocaine use     History of marijuana use     Hypertension     Osteomyelitis     Poor historian     records obtained from nursing home records & his family    Visual impairment            Past Surgical History:   Procedure Laterality Date    AMPUTATION FOOT Left 10/18/2022    Procedure: PARTIAL FIRST RAY AMPUTATION LEFT;  Surgeon: Yeison Petty MD;  Location: [facility];  Service: Orthopedics;   "Laterality: Left;    AMPUTATION FOOT Left 12/5/2022    Procedure: Transmetatarsal of Left Foot;  Surgeon: Yeison Petty MD;  Location:  [facility];  Service: Orthopedics;  Laterality: Left;    ENDOSCOPY N/A 3/14/2024    Procedure: ESOPHAGOGASTRODUODENOSCOPY WITH JEJUNAL TUBE INSERTION AT BEDSIDE;  Surgeon: Brunner, Mark I, MD;  Location:  [facility];  Service: Gastroenterology;  Laterality: N/A;    ENDOSCOPY W/ PEG TUBE PLACEMENT N/A 4/1/2024    Procedure: ESOPHAGOGASTRODUODENOSCOPY WITH PERCUTANEOUS ENDOSCOPIC GASTROSTOMY TUBE INSERTION;  Surgeon: Brunner, Mark I, MD;  Location:  [facility];  Service: Gastroenterology;  Laterality: N/A;  EGD with PEG placement.  Secured at 3.5 cm    EYE SURGERY         Family History: family history includes Diabetes in his brother, brother, father, maternal grandfather, maternal grandmother, mother, and sister; Hypertension in his brother, brother, father, and mother.     Social History:  reports that he quit smoking about 7 years ago. His smoking use included cigarettes. He started smoking about 47 years ago. He has a 40 pack-year smoking history. He has been exposed to tobacco smoke. He has never used smokeless tobacco. He reports that he does not currently use alcohol. He reports current drug use. Drugs: Marijuana and \"Crack\" cocaine.  Social History     Social History Narrative    Caffeine 0-1 servings per day    Patient lives at home .       Medications:  Available home medication information reviewed.  ARIPiprazole, FLUoxetine, Insulin Pen Needle, PALLIATIVE CARE ORAL RINSE ([facility]), ProSource No Carb, QUEtiapine, Valproic Acid, Vitamin D3, acetaminophen, albuterol, amLODIPine, brimonidine, carvedilol, cloNIDine, esomeprazole, hydrALAZINE, hydrOXYzine, insulin detemir, insulin regular, ipratropium-albuterol, lansoprazole, sennosides, terazosin, timolol, and torsemide    No Known Allergies    Objective   Objective     Vital Signs:   Temp:  [98.8 °F (37.1 °C)-99.6 °F " (37.6 °C)] 98.9 °F (37.2 °C)  Heart Rate:  [59-70] 70  Resp:  [16-20] 18  BP: (126-182)/(52-90) 168/74  Flow (L/min):  [3] 3       Physical Exam   He is alert, can answer limited questions with short responses, does not indicate pain.  Does not recall symptoms he was having prior but states he is in no pain.  Can state his full name and knows he is at the hospital.  Mucous membranes are moist  Heart regular rate and rhythm  Lungs diffuse rhonchi bilaterally with projected mucousy sounds from upper airway  No accessory muscle usage  Abdomen soft, nontender.  PEG tube in place, site is clean dry and intact  No lower extremity edema    Result Review:  I have personally reviewed the results from the time of this admission to 9/12/2024 02:46 EDT and agree with these findings:  [x]  Laboratory list / accordion  []  Microbiology  [x]  Radiology  [x]  EKG/Telemetry   [x]  Cardiology/Vascular   []  Pathology  [x]  Old records  []  Other:  Most notable findings include: See A+P      LAB RESULTS:      Lab 09/11/24 1816   WBC 6.37   HEMOGLOBIN 6.9*   HEMATOCRIT 22.4*   PLATELETS 173   NEUTROS ABS 4.42   IMMATURE GRANS (ABS) 0.03   LYMPHS ABS 0.92   MONOS ABS 0.94*   EOS ABS 0.04   .9*   SED RATE 27*   CRP 1.64*   PROCALCITONIN 0.11   LACTATE 0.5         Lab 09/11/24 1816   SODIUM 132*   POTASSIUM 6.4*   CHLORIDE 101   CO2 22.0   ANION GAP 9.0   BUN 40*   CREATININE 1.62*   EGFR 46.2*   GLUCOSE 87   CALCIUM 8.7   MAGNESIUM 2.2         Lab 09/11/24 1816   TOTAL PROTEIN 6.8   ALBUMIN 3.5   GLOBULIN 3.3   ALT (SGPT) 85*   AST (SGOT) 37   BILIRUBIN <0.2   ALK PHOS 93                 Lab 09/11/24 2005 09/11/24 1816   IRON  --  26*   IRON SATURATION (TSAT)  --  7*   TIBC  --  355   TRANSFERRIN  --  238   FERRITIN  --  308.30   ABO TYPING O  --    RH TYPING Positive  --    ANTIBODY SCREEN Negative  --          Lab 09/11/24  2151   PH, ARTERIAL 7.369   PCO2, ARTERIAL 42.2   PO2 .0*   FIO2 21   HCO3 ART 24.3   BASE  EXCESS ART -1.0*   CARBOXYHEMOGLOBIN 1.3     UA          3/10/2024    14:15 6/6/2024    21:50 9/11/2024    19:05   Urinalysis   Squamous Epithelial Cells, UA 0-2   0-2    Specific Gravity, UA 1.028  1.017  1.013    Ketones, UA Negative  Negative  Negative    Blood, UA Negative  Negative  Negative    Leukocytes, UA Negative  Negative  Trace    Nitrite, UA Negative  Negative  Negative    RBC, UA None Seen   11-20    WBC, UA 0-2   0-2    Bacteria, UA Trace   None Seen        Microbiology Results (last 10 days)       Procedure Component Value - Date/Time    Respiratory Panel PCR w/COVID-19(SARS-CoV-2) [facility] In-House, NP Swab in UTM/VTM, 2 HR TAT - Swab, Nasopharynx [[id number]]  (Abnormal) Collected: 09/11/24 1851    Lab Status: Final result Specimen: Swab from Nasopharynx Updated: 09/11/24 2000     ADENOVIRUS, PCR Not Detected     Coronavirus 229E Not Detected     Coronavirus HKU1 Not Detected     Coronavirus NL63 Not Detected     Coronavirus OC43 Not Detected     COVID19 Detected     Human Metapneumovirus Not Detected     Human Rhinovirus/Enterovirus Not Detected     Influenza A PCR Not Detected     Influenza B PCR Not Detected     Parainfluenza Virus 1 Not Detected     Parainfluenza Virus 2 Not Detected     Parainfluenza Virus 3 Not Detected     Parainfluenza Virus 4 Not Detected     RSV, PCR Not Detected     Bordetella pertussis pcr Not Detected     Bordetella parapertussis PCR Not Detected     Chlamydophila pneumoniae PCR Not Detected     Mycoplasma pneumo by PCR Not Detected    Narrative:      In the setting of a positive respiratory panel with a viral infection PLUS a negative procalcitonin without other underlying concern for bacterial infection, consider observing off antibiotics or discontinuation of antibiotics and continue supportive care. If the respiratory panel is positive for atypical bacterial infection (Bordetella pertussis, Chlamydophila pneumoniae, or Mycoplasma pneumoniae), consider  antibiotic de-escalation to target atypical bacterial infection.            CT Chest With Contrast Diagnostic    Result Date: 9/11/2024  CT CHEST W CONTRAST DIAGNOSTIC, CT ABDOMEN PELVIS W CONTRAST Date of Exam: 9/11/2024 7:26 PM EDT Indication: covid, fever. Comparison: 3/14/2024 Technique: Axial CT images were obtained of the chest, abdomen and pelvis after the uneventful intravenous administration of 85 cc Isovue-300.  Reconstructed coronal and sagittal images were also obtained. Automated exposure control and iterative construction methods were used. Findings: CT chest: Lower Neck: Unremarkable. Hilum and Mediastinum: Scattered prominent mediastinal and perihilar lymph nodes. No pathologically enlarged lymph nodes. Mild cardiomegaly. Trace pericardial effusion. The esophagus is mildly patulous. No definite suspicious mass. The vasculature is grossly unremarkable. Lung Parenchyma and Pleura: Patchy opacities scattered throughout the lungs, that are more consolidative in the lower lobes bilaterally. Trace bilateral pleural effusions. No pneumothorax. The central airways are patent. Soft tissues: Unremarkable. Osseous structures: No acute osseous abnormality.. Mild cortical deformity of the superior endplate of T9 most likely represents a small Schmorl's node, unchanged. CT abdomen and pelvis: Liver: Liver is normal in size and CT density. No focal lesions. Gallbladder: The gallbladder is normal without evidence of radiopaque stones. Bile Ducts: No billiary dcutal dilation. Spleen: Spleen is normal in size and CT density. Pancreas: Pancreas is normal. There is no evidence of pancreatic mass or peripancreatic fluid. Adrenals: Adrenal glands are unremarkable. Kidneys: Kidneys are normal in size. There are no stones or hydronephrosis. Gastrointestinal: Mild to moderate stool burden noted throughout the colon with a small to moderate-sized stool ball within the rectum. No acute inflammatory changes. No significant  distention to suggest bowel obstruction. Mild diverticulosis of the descending and sigmoid colon without evidence of acute diverticulitis. G-tube is in adequate position. Possible small lipoma within the greater curvature of the stomach. Bladder: Circumferential bladder wall thickening Pelvis:  No suspecious mass. Peritoneum/Mesentery: No fluid collection, ascities, or free air.   Lymph Nodes: No lymphadenopathy. Vasculature: Unremarkable Abdominal Wall: Unremarkable Bony Structures: No acute osseous abnormality. Unchanged grade 1 anterolisthesis of L4 and L5.     Impression: Impression: Findings consistent with multifocal pneumonia/pneumonitis, most significantly involving the lower lobes bilaterally. No definite acute intra-abdominal intrapelvic abnormality. Mild circumferential bladder wall thickening is nonspecific but may represent cystitis. Please correlate with urinalysis. Evidence of possible fecal impaction with associated mild constipation. Electronically Signed: Ruben Medina DO  9/11/2024 7:50 PM EDT  Workstation ID: [id number]    CT Abdomen Pelvis With Contrast    Result Date: 9/11/2024  CT CHEST W CONTRAST DIAGNOSTIC, CT ABDOMEN PELVIS W CONTRAST Date of Exam: 9/11/2024 7:26 PM EDT Indication: covid, fever. Comparison: 3/14/2024 Technique: Axial CT images were obtained of the chest, abdomen and pelvis after the uneventful intravenous administration of 85 cc Isovue-300.  Reconstructed coronal and sagittal images were also obtained. Automated exposure control and iterative construction methods were used. Findings: CT chest: Lower Neck: Unremarkable. Hilum and Mediastinum: Scattered prominent mediastinal and perihilar lymph nodes. No pathologically enlarged lymph nodes. Mild cardiomegaly. Trace pericardial effusion. The esophagus is mildly patulous. No definite suspicious mass. The vasculature is grossly unremarkable. Lung Parenchyma and Pleura: Patchy opacities scattered throughout the lungs, that are  more consolidative in the lower lobes bilaterally. Trace bilateral pleural effusions. No pneumothorax. The central airways are patent. Soft tissues: Unremarkable. Osseous structures: No acute osseous abnormality.. Mild cortical deformity of the superior endplate of T9 most likely represents a small Schmorl's node, unchanged. CT abdomen and pelvis: Liver: Liver is normal in size and CT density. No focal lesions. Gallbladder: The gallbladder is normal without evidence of radiopaque stones. Bile Ducts: No billiary dcutal dilation. Spleen: Spleen is normal in size and CT density. Pancreas: Pancreas is normal. There is no evidence of pancreatic mass or peripancreatic fluid. Adrenals: Adrenal glands are unremarkable. Kidneys: Kidneys are normal in size. There are no stones or hydronephrosis. Gastrointestinal: Mild to moderate stool burden noted throughout the colon with a small to moderate-sized stool ball within the rectum. No acute inflammatory changes. No significant distention to suggest bowel obstruction. Mild diverticulosis of the descending and sigmoid colon without evidence of acute diverticulitis. G-tube is in adequate position. Possible small lipoma within the greater curvature of the stomach. Bladder: Circumferential bladder wall thickening Pelvis:  No suspecious mass. Peritoneum/Mesentery: No fluid collection, ascities, or free air.   Lymph Nodes: No lymphadenopathy. Vasculature: Unremarkable Abdominal Wall: Unremarkable Bony Structures: No acute osseous abnormality. Unchanged grade 1 anterolisthesis of L4 and L5.     Impression: Impression: Findings consistent with multifocal pneumonia/pneumonitis, most significantly involving the lower lobes bilaterally. No definite acute intra-abdominal intrapelvic abnormality. Mild circumferential bladder wall thickening is nonspecific but may represent cystitis. Please correlate with urinalysis. Evidence of possible fecal impaction with associated mild constipation.  Electronically Signed: Ruben DO Adam  9/11/2024 7:50 PM EDT  Workstation ID: [id number]    CT Head Without Contrast    Result Date: 9/11/2024  CT HEAD WO CONTRAST Date of Exam: 9/11/2024 7:26 PM EDT Indication: possible sz. Comparison: 3/11/2024 Technique: Axial CT images were obtained of the head without contrast administration.  Automated exposure control and iterative construction methods were used. Findings: There is no evidence of hemorrhage. There is no mass effect or midline shift. Diffuse brain atrophy with chronic microvascular ischemic changes There is no extracerebral collection. Ventricles are normal in size and configuration for patient's stated age. Posterior fossa is within normal limits. Calvarium and skull base appear intact. Mucosal thickening in the bilateral maxillary sinuses and ethmoids. Small air-fluid level left sphenoid. Stable chronic findings with possible surgical change in the right globe.     Impression: Impression: No acute intracranial abnormality Electronically Signed: Aramis Clark MD  9/11/2024 7:42 PM EDT  Workstation ID: [id number]     Results for orders placed during the hospital encounter of 01/15/24    Adult Transthoracic Echo Complete With Contrast if Necessary Per Protocol    Interpretation Summary    Left ventricular ejection fraction appears to be 56 - 60%.    Left ventricular wall thickness is consistent with hypertrophy.    Estimated right ventricular systolic pressure from tricuspid regurgitation is mildly elevated (35-45 mmHg).    There is a small (1-2cm) pericardial effusion.    No evidence for pericardial tamponade      Assessment & Plan   Assessment & Plan       Hypoxia    Type 2 diabetes mellitus    Essential hypertension    Neurocognitive disorder    Anemia of chronic disease    Aspiration pneumonia    Chronic kidney disease    Dementia    Iron deficiency anemia    Encephalopathy    Bilateral pneumonia    COVID-19 virus detected    Acute hypoxia  Sepsis  secondary to multifocal pneumonia  History of ESBL Klebsiella  COVID-19 positive  -Presented tachypneic, febrile, new oxygen requirement of 3 L.  COVID test positive day of admission.  Multifocal pneumonia on CT scan.  Chronic aspiration with PEG tube in place.  Suspect findings related to chronic aspiration and acute COVID, however, given history of severe respiratory failure and ESBL klebsiella will be aggressive with treatment.   -Start remdesivir + decadron  -Discussed with ID Dr Cody overnight, recommended IV Nuzyra 200 mg IV now then 100 mg IV daily for pneumonia coverage. Unfortunately pharmacy will need to special order this, so I d/w pharmacy and will dose eravacycline for now. Will need to discuss with ID in am to have Nuzyra ordered  -F/u blood and sputum cx, obtain induced sputum if unable to cough up secretions, f/u pathogen specific antigens  -Aggressive bronchopulmonary hygiene, IS, OPEP, suctioning    Acute on chronic anemia  -Baseline hemoglobin near 8.  He is 6.9 on presentation.  No signs of bleeding at this time.  MCV is elevated.  Fecal occult in ED is negative.  Suspect related to acute on chronic illness  -Will check iron studies, reticulocyte count, B12, folate  -S/p 1u PRBC in ED, monitor f/u h/h    Hyperkalemia  -K 6.4 on admission, received treatment in ED including Ca gluconate. Repeat K now    JAYY on CKD  -Cr 1.62 on admission but has fluctuated significantly over the last year. Last value 0.92 5 months ago. Will monitor output, hold nephrotoxic meds, start gentle MIVF while off tube feeds, and recheck creatinine in am.    Chronic aspiration  -PEG tube in place, hold feeds at this time until respiratory status improves. Dietician consult for when feeding resumed    HFpEF  HTN  -appears compensated at this time. Holding home coreg in favor of remdesivir given HR 60s, if stable can add back in. Holding torsemide, clonidine, terazosin, and hydralazine in setting of sepsis. Daily wts  and I+O    T2DM  -sugars low range on admission, hold antihyperglycemics at this time    Dementia with agitation  Seizure disorder?  -Given decreased responsiveness leading to initial presentation we will hold most of sedating meds for now.  He is on Depakote at home with subtherapeutic levels however it appears that he was placed on this by neurology for agitation.  Will continue for now, there is a history of seizure disorder in chart but does not appear that he is on anything specifically for this indication  -Add back other sedatives as needed      VTE Prophylaxis:  Mechanical VTE prophylaxis orders are present.          CODE STATUS:    Code Status and Medical Interventions: CPR (Attempt to Resuscitate); Full Support; Cannot reach family, deferring to prior   Ordered at: 09/12/24 0003     Code Status (Patient has no pulse and is not breathing):    CPR (Attempt to Resuscitate)     Medical Interventions (Patient has pulse or is breathing):    Full Support     Comments:    Cannot reach family, deferring to prior       Expected Discharge   Expected discharge date/ time has not been documented.     Alberto Oh MD  09/12/24

## 2024-09-12 NOTE — ED NOTES
Omkar Nava    Nursing Report ED to Floor:  Mental status: drowsy, oriented to self, follows commands  Ambulatory status: bed bound  Oxygen Therapy:  3L NC  Cardiac Rhythm: nsr  Admitted from: [facility]  Safety Concerns:  fall risk  Social Issues: none  ED Room #:  10    ED Nurse Phone Extension - [phone] or may call [phone].      HPI:   Chief Complaint   Patient presents with    Fatigue    Exposure To Known Illness       Past Medical History:  Past Medical History:   Diagnosis Date    Anemia     Dementia     Diabetes mellitus     Dysphagia     GERD (gastroesophageal reflux disease)     History of alcohol abuse     History of cocaine use     History of marijuana use     Hypertension     Osteomyelitis     Poor historian     records obtained from nursing home records & his family    Visual impairment         Past Surgical History:  Past Surgical History:   Procedure Laterality Date    AMPUTATION FOOT Left 10/18/2022    Procedure: PARTIAL FIRST RAY AMPUTATION LEFT;  Surgeon: Yeison Petty MD;  Location:  [facility];  Service: Orthopedics;  Laterality: Left;    AMPUTATION FOOT Left 12/5/2022    Procedure: Transmetatarsal of Left Foot;  Surgeon: Yeiosn Petty MD;  Location:  [facility];  Service: Orthopedics;  Laterality: Left;    ENDOSCOPY N/A 3/14/2024    Procedure: ESOPHAGOGASTRODUODENOSCOPY WITH JEJUNAL TUBE INSERTION AT BEDSIDE;  Surgeon: Brunner, Mark I, MD;  Location:  [facility];  Service: Gastroenterology;  Laterality: N/A;    ENDOSCOPY W/ PEG TUBE PLACEMENT N/A 4/1/2024    Procedure: ESOPHAGOGASTRODUODENOSCOPY WITH PERCUTANEOUS ENDOSCOPIC GASTROSTOMY TUBE INSERTION;  Surgeon: Brunner, Mark I, MD;  Location:  [facility];  Service: Gastroenterology;  Laterality: N/A;  EGD with PEG placement.  Secured at 3.5 cm    EYE SURGERY          Admitting Doctor:   Alberto Oh MD    Consulting Provider(s):  Consults       No orders found from 8/13/2024 to 9/12/2024.             Admitting Diagnosis:   The  primary encounter diagnosis was Multifocal pneumonia. Diagnoses of COVID-19, Hyperkalemia, Acute anemia, Moderate Alzheimer's dementia with psychotic disturbance, unspecified timing of dementia onset, Oropharyngeal dysphagia, and Generalized muscle weakness were also pertinent to this visit.    Most Recent Vitals:   Vitals:    09/11/24 2310 09/11/24 2320 09/11/24 2330 09/11/24 2333   BP: 161/70 160/56 162/64    BP Location:       Patient Position:       Pulse: 61 62 64    Resp:    18   Temp:    98.8 °F (37.1 °C)   TempSrc:    Axillary   SpO2: 98% 98% 98%    Weight:       Height:           Active LDAs/IV Access:   Lines, Drains & Airways       Active LDAs       Name Placement date Placement time Site Days    Peripheral IV 09/11/24 1807 Anterior;Right Forearm 09/11/24 1807  Forearm  less than 1    Peripheral IV 09/11/24 2158 Left Antecubital 09/11/24 2158  Antecubital  less than 1    Gastrostomy/Enterostomy Percutaneous endoscopic gastrostomy (PEG) 1 20 Fr. LUQ 04/01/24  1453  LUQ  163                    Labs (abnormal labs have a star):   Labs Reviewed   RESPIRATORY PANEL PCR W/ COVID-19 (SARS-COV-2), NP SWAB IN UTM/VTP, 2 HR TAT - Abnormal; Notable for the following components:       Result Value    COVID19 Detected (*)     All other components within normal limits    Narrative:     In the setting of a positive respiratory panel with a viral infection PLUS a negative procalcitonin without other underlying concern for bacterial infection, consider observing off antibiotics or discontinuation of antibiotics and continue supportive care. If the respiratory panel is positive for atypical bacterial infection (Bordetella pertussis, Chlamydophila pneumoniae, or Mycoplasma pneumoniae), consider antibiotic de-escalation to target atypical bacterial infection.   CBC WITH AUTO DIFFERENTIAL - Abnormal; Notable for the following components:    RBC 2.22 (*)     Hemoglobin 6.9 (*)     Hematocrit 22.4 (*)     .9 (*)      MCHC 30.8 (*)     Lymphocyte % 14.4 (*)     Monocyte % 14.8 (*)     Monocytes, Absolute 0.94 (*)     All other components within normal limits   COMPREHENSIVE METABOLIC PANEL - Abnormal; Notable for the following components:    BUN 40 (*)     Creatinine 1.62 (*)     Sodium 132 (*)     Potassium 6.4 (*)     ALT (SGPT) 85 (*)     eGFR 46.2 (*)     All other components within normal limits    Narrative:     GFR Normal >60  Chronic Kidney Disease <60  Kidney Failure <15     SEDIMENTATION RATE - Abnormal; Notable for the following components:    Sed Rate 27 (*)     All other components within normal limits   C-REACTIVE PROTEIN - Abnormal; Notable for the following components:    C-Reactive Protein 1.64 (*)     All other components within normal limits   URINALYSIS W/ CULTURE IF INDICATED - Abnormal; Notable for the following components:    Protein, UA Trace (*)     Leuk Esterase, UA Trace (*)     All other components within normal limits    Narrative:     In absence of clinical symptoms, the presence of pyuria, bacteria, and/or nitrites on the urinalysis result does not correlate with infection.   VALPROIC ACID LEVEL, TOTAL - Abnormal; Notable for the following components:    Valproic Acid 13.4 (*)     All other components within normal limits    Narrative:     Therapeutic Ranges for Valproic Acid    Epilepsy:       mcg/ml  Bipolar/[place]  up to 125 mcg/ml     URINALYSIS, MICROSCOPIC ONLY - Abnormal; Notable for the following components:    RBC, UA 11-20 (*)     All other components within normal limits   BLOOD GAS, ARTERIAL W/CO-OXIMETRY - Abnormal; Notable for the following components:    pO2, Arterial 131.0 (*)     Base Excess, Arterial -1.0 (*)     Hemoglobin, Blood Gas 7.2 (*)     Hematocrit, Blood Gas 22.2 (*)     pO2, Temperature Corrected 131 (*)     All other components within normal limits   LACTIC ACID, PLASMA - Normal   PROCALCITONIN - Normal    Narrative:     As a Marker for Sepsis (Non-Neonates):    1.  "<0.5 ng/mL represents a low risk of severe sepsis and/or septic shock.  2. >2 ng/mL represents a high risk of severe sepsis and/or septic shock.    As a Marker for Lower Respiratory Tract Infections that require antibiotic therapy:    PCT on Admission    Antibiotic Therapy       6-12 Hrs later    >0.5                Strongly Recommended  >0.25 - <0.5        Recommended   0.1 - 0.25          Discouraged              Remeasure/reassess PCT  <0.1                Strongly Discouraged     Remeasure/reassess PCT    As 28 day mortality risk marker: \"Change in Procalcitonin Result\" (>80% or <=80%) if Day 0 (or Day 1) and Day 4 values are available. Refer to [email or web address]://[email or web address]-[email or web address]    Change in PCT <=80%  A decrease of PCT levels below or equal to 80% defines a positive change in PCT test result representing a higher risk for 28-day all-cause mortality of patients diagnosed with severe sepsis for septic shock.    Change in PCT >80%  A decrease of PCT levels of more than 80% defines a negative change in PCT result representing a lower risk for 28-day all-cause mortality of patients diagnosed with severe sepsis or septic shock.      MAGNESIUM - Normal   POCT OCCULT BLOOD STOOL - Normal   BLOOD CULTURE   BLOOD CULTURE   RAINBOW DRAW    Narrative:     The following orders were created for panel order [place] Draw.  Procedure                               Abnormality         Status                     ---------                               -----------         ------                     Green Top (Gel)[[id number]]                                  Final result               Lavender Top[[id number]]                                     Final result               Gold Top - SST[[id number]]                                   Final result               Gray Top[[id number]]                                         Final result               Light Blue Top[[id number]]                                   Final result             "     Please view results for these tests on the individual orders.   BLOOD GAS, ARTERIAL   COMPREHENSIVE METABOLIC PANEL   POCT GLUCOSE FINGERSTICK   POCT GLUCOSE FINGERSTICK   POCT GLUCOSE FINGERSTICK   POCT GLUCOSE FINGERSTICK   PREPARE RBC   TYPE AND SCREEN   GREEN TOP   LAVENDER TOP   GOLD TOP - SST   GRAY TOP   LIGHT BLUE TOP       Meds Given in ED:   Medications   vancomycin IVPB 1750 mg in 0.9% Sodium Chloride (premix) 500 mL (1,750 mg Intravenous New Bag 9/11/24 2313)   dexAMETHasone (DECADRON) tablet 6 mg (has no administration in time range)     Or   dexAMETHasone (DECADRON) injection 6 mg (has no administration in time range)   sodium chloride 0.9 % bolus 1,000 mL (0 mL Intravenous Stopped 9/11/24 2042)   calcium gluconate 1000 Mg/50ml 0.675% NaCl IV SOLN (0 mg Intravenous Stopped 9/11/24 2042)   insulin regular (humuLIN R,novoLIN R) injection 10 Units (10 Units Intravenous Given 9/11/24 1918)   dextrose (D50W) (25 g/50 mL) IV injection 25 g (25 g Intravenous Given 9/11/24 1919)   iopamidol (ISOVUE-300) 61 % injection 100 mL (85 mL Intravenous Given 9/11/24 1938)   cefTRIAXone (ROCEPHIN) 2,000 mg in sodium chloride 0.9 % 100 mL MBP (0 mg Intravenous Stopped 9/11/24 2219)   doxycycline (VIBRAMYCIN) 100 mg in sodium chloride 0.9 % 100 mL MBP (0 mg Intravenous Stopped 9/11/24 2249)           Last NIH score:                                                          Dysphagia screening results:        [place] Coma Scale:  No data recorded     CIWA:        Restraint Type:            Isolation Status:  No active isolations

## 2024-09-12 NOTE — NURSING NOTE
Patient has order for hourly blood glucose checks. Patient pushing staff away and attempting to hit staff so unable to check blood glucose hourly as ordered.

## 2024-09-13 ENCOUNTER — APPOINTMENT (OUTPATIENT)
Dept: GENERAL RADIOLOGY | Facility: HOSPITAL | Age: 69
End: 2024-09-13
Payer: MEDICARE

## 2024-09-13 LAB
ALBUMIN SERPL-MCNC: 3.6 G/DL (ref 3.5–5.2)
ALBUMIN/GLOB SERPL: 1.2 G/DL
ALP SERPL-CCNC: 96 U/L (ref 39–117)
ALT SERPL W P-5'-P-CCNC: 70 U/L (ref 1–41)
ANION GAP SERPL CALCULATED.3IONS-SCNC: 10 MMOL/L (ref 5–15)
AST SERPL-CCNC: 36 U/L (ref 1–40)
BASOPHILS # BLD AUTO: 0.01 10*3/MM3 (ref 0–0.2)
BASOPHILS NFR BLD AUTO: 0.1 % (ref 0–1.5)
BILIRUB SERPL-MCNC: 0.2 MG/DL (ref 0–1.2)
BUN SERPL-MCNC: 30 MG/DL (ref 8–23)
BUN/CREAT SERPL: 24.6 (ref 7–25)
CALCIUM SPEC-SCNC: 9.1 MG/DL (ref 8.6–10.5)
CHLORIDE SERPL-SCNC: 105 MMOL/L (ref 98–107)
CK SERPL-CCNC: 308 U/L (ref 20–200)
CO2 SERPL-SCNC: 22 MMOL/L (ref 22–29)
CREAT SERPL-MCNC: 1.22 MG/DL (ref 0.76–1.27)
CRP SERPL-MCNC: 3.67 MG/DL (ref 0–0.5)
DEPRECATED RDW RBC AUTO: 50.3 FL (ref 37–54)
EGFRCR SERPLBLD CKD-EPI 2021: 65 ML/MIN/1.73
EOSINOPHIL # BLD AUTO: 0.03 10*3/MM3 (ref 0–0.4)
EOSINOPHIL NFR BLD AUTO: 0.4 % (ref 0.3–6.2)
ERYTHROCYTE [DISTWIDTH] IN BLOOD BY AUTOMATED COUNT: 14.2 % (ref 12.3–15.4)
GLOBULIN UR ELPH-MCNC: 2.9 GM/DL
GLUCOSE BLDC GLUCOMTR-MCNC: 103 MG/DL (ref 70–130)
GLUCOSE BLDC GLUCOMTR-MCNC: 131 MG/DL (ref 70–130)
GLUCOSE BLDC GLUCOMTR-MCNC: 170 MG/DL (ref 70–130)
GLUCOSE BLDC GLUCOMTR-MCNC: 180 MG/DL (ref 70–130)
GLUCOSE BLDC GLUCOMTR-MCNC: 246 MG/DL (ref 70–130)
GLUCOSE SERPL-MCNC: 97 MG/DL (ref 65–99)
HCT VFR BLD AUTO: 28.9 % (ref 37.5–51)
HGB BLD-MCNC: 9.2 G/DL (ref 13–17.7)
IMM GRANULOCYTES # BLD AUTO: 0.03 10*3/MM3 (ref 0–0.05)
IMM GRANULOCYTES NFR BLD AUTO: 0.4 % (ref 0–0.5)
LYMPHOCYTES # BLD AUTO: 1.28 10*3/MM3 (ref 0.7–3.1)
LYMPHOCYTES NFR BLD AUTO: 16.4 % (ref 19.6–45.3)
MCH RBC QN AUTO: 30.6 PG (ref 26.6–33)
MCHC RBC AUTO-ENTMCNC: 31.8 G/DL (ref 31.5–35.7)
MCV RBC AUTO: 96 FL (ref 79–97)
MONOCYTES # BLD AUTO: 0.61 10*3/MM3 (ref 0.1–0.9)
MONOCYTES NFR BLD AUTO: 7.8 % (ref 5–12)
NEUTROPHILS NFR BLD AUTO: 5.84 10*3/MM3 (ref 1.7–7)
NEUTROPHILS NFR BLD AUTO: 74.9 % (ref 42.7–76)
NRBC BLD AUTO-RTO: 0 /100 WBC (ref 0–0.2)
PLATELET # BLD AUTO: 184 10*3/MM3 (ref 140–450)
PMV BLD AUTO: 11 FL (ref 6–12)
POTASSIUM SERPL-SCNC: 5.4 MMOL/L (ref 3.5–5.2)
PROT SERPL-MCNC: 6.5 G/DL (ref 6–8.5)
RBC # BLD AUTO: 3.01 10*6/MM3 (ref 4.14–5.8)
SODIUM SERPL-SCNC: 137 MMOL/L (ref 136–145)
WBC NRBC COR # BLD AUTO: 7.8 10*3/MM3 (ref 3.4–10.8)

## 2024-09-13 PROCEDURE — 99232 SBSQ HOSP IP/OBS MODERATE 35: CPT | Performed by: INTERNAL MEDICINE

## 2024-09-13 PROCEDURE — 80053 COMPREHEN METABOLIC PANEL: CPT | Performed by: STUDENT IN AN ORGANIZED HEALTH CARE EDUCATION/TRAINING PROGRAM

## 2024-09-13 PROCEDURE — 94799 UNLISTED PULMONARY SVC/PX: CPT

## 2024-09-13 PROCEDURE — 25810000003 SODIUM CHLORIDE 0.9 % SOLUTION 250 ML FLEX CONT: Performed by: STUDENT IN AN ORGANIZED HEALTH CARE EDUCATION/TRAINING PROGRAM

## 2024-09-13 PROCEDURE — 74230 X-RAY XM SWLNG FUNCJ C+: CPT

## 2024-09-13 PROCEDURE — 25010000002 HYDRALAZINE PER 20 MG: Performed by: PHYSICIAN ASSISTANT

## 2024-09-13 PROCEDURE — 94761 N-INVAS EAR/PLS OXIMETRY MLT: CPT

## 2024-09-13 PROCEDURE — 63710000001 DEXAMETHASONE PER 0.25 MG: Performed by: STUDENT IN AN ORGANIZED HEALTH CARE EDUCATION/TRAINING PROGRAM

## 2024-09-13 PROCEDURE — 86140 C-REACTIVE PROTEIN: CPT | Performed by: INTERNAL MEDICINE

## 2024-09-13 PROCEDURE — 85025 COMPLETE CBC W/AUTO DIFF WBC: CPT | Performed by: INTERNAL MEDICINE

## 2024-09-13 PROCEDURE — 25010000002 CEFEPIME PER 500 MG: Performed by: INTERNAL MEDICINE

## 2024-09-13 PROCEDURE — 82550 ASSAY OF CK (CPK): CPT | Performed by: INTERNAL MEDICINE

## 2024-09-13 PROCEDURE — 25810000003 DEXTROSE 5 % AND SODIUM CHLORIDE 0.9 % 5-0.9 % SOLUTION: Performed by: INTERNAL MEDICINE

## 2024-09-13 PROCEDURE — 82948 REAGENT STRIP/BLOOD GLUCOSE: CPT

## 2024-09-13 PROCEDURE — 94664 DEMO&/EVAL PT USE INHALER: CPT

## 2024-09-13 PROCEDURE — 92611 MOTION FLUOROSCOPY/SWALLOW: CPT | Performed by: SPEECH-LANGUAGE PATHOLOGIST

## 2024-09-13 PROCEDURE — 25010000002 REMDESIVIR 100 MG/20ML SOLUTION 1 EACH VIAL: Performed by: STUDENT IN AN ORGANIZED HEALTH CARE EDUCATION/TRAINING PROGRAM

## 2024-09-13 RX ORDER — LINEZOLID 600 MG/1
600 TABLET, FILM COATED ORAL 2 TIMES DAILY
Status: DISCONTINUED | OUTPATIENT
Start: 2024-09-13 | End: 2024-09-14

## 2024-09-13 RX ORDER — HYDRALAZINE HYDROCHLORIDE 20 MG/ML
10 INJECTION INTRAMUSCULAR; INTRAVENOUS ONCE
Status: COMPLETED | OUTPATIENT
Start: 2024-09-13 | End: 2024-09-13

## 2024-09-13 RX ORDER — ACETAMINOPHEN 160 MG/5ML
650 SOLUTION ORAL ONCE
Status: COMPLETED | OUTPATIENT
Start: 2024-09-13 | End: 2024-09-13

## 2024-09-13 RX ADMIN — IPRATROPIUM BROMIDE AND ALBUTEROL SULFATE 3 ML: 2.5; .5 SOLUTION RESPIRATORY (INHALATION) at 00:29

## 2024-09-13 RX ADMIN — CARVEDILOL 12.5 MG: 12.5 TABLET, FILM COATED ORAL at 08:38

## 2024-09-13 RX ADMIN — IPRATROPIUM BROMIDE AND ALBUTEROL SULFATE 3 ML: 2.5; .5 SOLUTION RESPIRATORY (INHALATION) at 19:09

## 2024-09-13 RX ADMIN — BARIUM SULFATE 100 ML: 0.81 POWDER, FOR SUSPENSION ORAL at 15:45

## 2024-09-13 RX ADMIN — DOXYCYCLINE 100 MG: 100 CAPSULE ORAL at 00:55

## 2024-09-13 RX ADMIN — SODIUM ZIRCONIUM CYCLOSILICATE 10 G: 10 POWDER, FOR SUSPENSION ORAL at 18:44

## 2024-09-13 RX ADMIN — MINERAL OIL 5 ML: 1000 LIQUID ORAL at 18:44

## 2024-09-13 RX ADMIN — BRIMONIDINE TARTRATE 1 DROP: 2 SOLUTION/ DROPS OPHTHALMIC at 16:03

## 2024-09-13 RX ADMIN — TIMOLOL MALEATE 1 DROP: 5 SOLUTION/ DROPS OPHTHALMIC at 08:41

## 2024-09-13 RX ADMIN — MINERAL OIL 5 ML: 1000 LIQUID ORAL at 21:25

## 2024-09-13 RX ADMIN — MINERAL OIL 5 ML: 1000 LIQUID ORAL at 12:11

## 2024-09-13 RX ADMIN — MINERAL OIL 5 ML: 1000 LIQUID ORAL at 08:41

## 2024-09-13 RX ADMIN — HYDRALAZINE HYDROCHLORIDE 100 MG: 50 TABLET ORAL at 14:20

## 2024-09-13 RX ADMIN — HYDRALAZINE HYDROCHLORIDE 100 MG: 50 TABLET ORAL at 21:24

## 2024-09-13 RX ADMIN — HYDRALAZINE HYDROCHLORIDE 10 MG: 20 INJECTION INTRAMUSCULAR; INTRAVENOUS at 00:55

## 2024-09-13 RX ADMIN — CLONIDINE HYDROCHLORIDE 0.2 MG: 0.2 TABLET ORAL at 14:21

## 2024-09-13 RX ADMIN — IPRATROPIUM BROMIDE AND ALBUTEROL SULFATE 3 ML: 2.5; .5 SOLUTION RESPIRATORY (INHALATION) at 12:14

## 2024-09-13 RX ADMIN — DOXYCYCLINE 100 MG: 100 CAPSULE ORAL at 11:57

## 2024-09-13 RX ADMIN — VALPROIC ACID 150 MG: 250 SOLUTION ORAL at 21:24

## 2024-09-13 RX ADMIN — LANSOPRAZOLE 30 MG: 15 TABLET, ORALLY DISINTEGRATING ORAL at 05:45

## 2024-09-13 RX ADMIN — DEXTROSE AND SODIUM CHLORIDE 50 ML/HR: 5; 900 INJECTION, SOLUTION INTRAVENOUS at 12:18

## 2024-09-13 RX ADMIN — REMDESIVIR 100 MG: 100 INJECTION, POWDER, LYOPHILIZED, FOR SOLUTION INTRAVENOUS at 06:15

## 2024-09-13 RX ADMIN — CLONIDINE HYDROCHLORIDE 0.2 MG: 0.2 TABLET ORAL at 21:26

## 2024-09-13 RX ADMIN — BRIMONIDINE TARTRATE 1 DROP: 2 SOLUTION/ DROPS OPHTHALMIC at 21:26

## 2024-09-13 RX ADMIN — Medication 10 ML: at 08:39

## 2024-09-13 RX ADMIN — AMLODIPINE BESYLATE 10 MG: 10 TABLET ORAL at 08:39

## 2024-09-13 RX ADMIN — VALPROIC ACID 150 MG: 250 SOLUTION ORAL at 14:20

## 2024-09-13 RX ADMIN — Medication 10 ML: at 21:25

## 2024-09-13 RX ADMIN — CARVEDILOL 12.5 MG: 12.5 TABLET, FILM COATED ORAL at 21:26

## 2024-09-13 RX ADMIN — CEFEPIME 2000 MG: 2 INJECTION, POWDER, FOR SOLUTION INTRAVENOUS at 05:41

## 2024-09-13 RX ADMIN — DEXAMETHASONE 6 MG: 4 TABLET ORAL at 08:38

## 2024-09-13 RX ADMIN — IPRATROPIUM BROMIDE AND ALBUTEROL SULFATE 3 ML: 2.5; .5 SOLUTION RESPIRATORY (INHALATION) at 07:40

## 2024-09-13 RX ADMIN — BARIUM SULFATE 20 ML: 400 PASTE ORAL at 15:45

## 2024-09-13 RX ADMIN — VALPROIC ACID 150 MG: 250 SOLUTION ORAL at 05:45

## 2024-09-13 RX ADMIN — ACETAMINOPHEN 650 MG: 650 SOLUTION ORAL at 08:38

## 2024-09-13 RX ADMIN — CEFEPIME 2000 MG: 2 INJECTION, POWDER, FOR SOLUTION INTRAVENOUS at 18:44

## 2024-09-13 RX ADMIN — LINEZOLID 600 MG: 600 TABLET, FILM COATED ORAL at 21:25

## 2024-09-13 RX ADMIN — ACETAMINOPHEN 650 MG: 650 SOLUTION ORAL at 12:07

## 2024-09-13 RX ADMIN — TIMOLOL MALEATE 1 DROP: 5 SOLUTION/ DROPS OPHTHALMIC at 21:26

## 2024-09-13 RX ADMIN — BRIMONIDINE TARTRATE 1 DROP: 2 SOLUTION/ DROPS OPHTHALMIC at 08:41

## 2024-09-13 RX ADMIN — DOXYCYCLINE 100 MG: 100 CAPSULE ORAL at 23:55

## 2024-09-13 RX ADMIN — LINEZOLID 600 MG: 600 TABLET, FILM COATED ORAL at 16:39

## 2024-09-13 NOTE — PLAN OF CARE
Problem: Adult Inpatient Plan of Care  Goal: Plan of Care Review  Outcome: Ongoing, Progressing  Goal: Patient-Specific Goal (Individualized)  Outcome: Ongoing, Progressing  Goal: Absence of Hospital-Acquired Illness or Injury  Outcome: Ongoing, Progressing  Intervention: Identify and Manage Fall Risk  Recent Flowsheet Documentation  Taken 9/13/2024 0611 by Tyler Nevarez RN  Safety Promotion/Fall Prevention:   activity supervised   assistive device/personal items within reach   clutter free environment maintained   fall prevention program maintained   gait belt   mobility aid in reach   nonskid shoes/slippers when out of bed   room organization consistent   safety round/check completed  Taken 9/13/2024 0410 by Tyler Nevarez RN  Safety Promotion/Fall Prevention:   activity supervised   assistive device/personal items within reach   clutter free environment maintained   fall prevention program maintained   gait belt   mobility aid in reach   nonskid shoes/slippers when out of bed   room organization consistent   safety round/check completed  Taken 9/13/2024 0228 by Tyler Nevarez RN  Safety Promotion/Fall Prevention:   activity supervised   assistive device/personal items within reach   clutter free environment maintained   fall prevention program maintained   gait belt   mobility aid in reach   nonskid shoes/slippers when out of bed   room organization consistent   safety round/check completed  Taken 9/13/2024 0016 by Tyler Nevarez RN  Safety Promotion/Fall Prevention:   activity supervised   assistive device/personal items within reach   clutter free environment maintained   fall prevention program maintained   gait belt   mobility aid in reach   nonskid shoes/slippers when out of bed   room organization consistent   safety round/check completed  Taken 9/12/2024 2220 by Tyler Nevarez RN  Safety Promotion/Fall Prevention:   activity supervised   assistive device/personal items within reach   clutter free  environment maintained   fall prevention program maintained   gait belt   mobility aid in reach   nonskid shoes/slippers when out of bed   room organization consistent   safety round/check completed  Taken 9/12/2024 2015 by Tyler Nevarez RN  Safety Promotion/Fall Prevention:   activity supervised   assistive device/personal items within reach   clutter free environment maintained   fall prevention program maintained   gait belt   mobility aid in reach   nonskid shoes/slippers when out of bed   room organization consistent   safety round/check completed  Intervention: Prevent Skin Injury  Recent Flowsheet Documentation  Taken 9/13/2024 0611 by Tyler Nevarez RN  Body Position:   side-lying   left  Taken 9/13/2024 0410 by Tyler Nevarez RN  Body Position: supine  Skin Protection:   adhesive use limited   tubing/devices free from skin contact  Taken 9/13/2024 0228 by Tyler Nevarez RN  Body Position:   side-lying   right  Skin Protection:   adhesive use limited   tubing/devices free from skin contact  Taken 9/13/2024 0016 by Tyler Nevarez RN  Body Position:   side-lying   left  Skin Protection:   adhesive use limited   tubing/devices free from skin contact  Taken 9/12/2024 2220 by Tyler Nevarez RN  Body Position:   side-lying   right  Taken 9/12/2024 2015 by Tyler Nevarez RN  Body Position: supine  Skin Protection:   adhesive use limited   tubing/devices free from skin contact  Intervention: Prevent and Manage VTE (Venous Thromboembolism) Risk  Recent Flowsheet Documentation  Taken 9/13/2024 0611 by Tyler Nevarez RN  Activity Management: activity minimized  VTE Prevention/Management:   bilateral   sequential compression devices on  Taken 9/13/2024 0410 by Tyler Nevarez RN  Activity Management: activity minimized  VTE Prevention/Management:   bilateral   sequential compression devices on  Taken 9/13/2024 0228 by Tyler Nevarez RN  Activity Management: activity minimized  VTE Prevention/Management:    bilateral   sequential compression devices on  Taken 9/13/2024 0016 by Tyler Nevarez RN  Activity Management: activity minimized  VTE Prevention/Management:   bilateral   sequential compression devices on  Taken 9/12/2024 2220 by Tyler Nevarez RN  Activity Management: activity encouraged  VTE Prevention/Management:   bilateral   sequential compression devices on  Taken 9/12/2024 2015 by Tyler Nevarez RN  Activity Management: activity encouraged  VTE Prevention/Management:   bilateral   sequential compression devices on  Range of Motion: active ROM (range of motion) encouraged  Intervention: Prevent Infection  Recent Flowsheet Documentation  Taken 9/13/2024 0611 by Tyler Nevarez RN  Infection Prevention:   cohorting utilized   environmental surveillance performed   hand hygiene promoted   personal protective equipment utilized   rest/sleep promoted   single patient room provided  Taken 9/13/2024 0410 by Tyler Nevarez RN  Infection Prevention:   cohorting utilized   environmental surveillance performed   hand hygiene promoted   personal protective equipment utilized   rest/sleep promoted   single patient room provided  Taken 9/13/2024 0228 by Tyler Nevarez RN  Infection Prevention:   cohorting utilized   personal protective equipment utilized   rest/sleep promoted   single patient room provided   hand hygiene promoted  Taken 9/13/2024 0016 by Tyler Nevarez RN  Infection Prevention:   cohorting utilized   environmental surveillance performed   personal protective equipment utilized   rest/sleep promoted   single patient room provided  Taken 9/12/2024 2220 by Tyler Nevarez RN  Infection Prevention:   cohorting utilized   hand hygiene promoted   personal protective equipment utilized   rest/sleep promoted   single patient room provided  Taken 9/12/2024 2015 by Tyler Nevarez RN  Infection Prevention:   cohorting utilized   environmental surveillance performed   rest/sleep promoted   single patient room  provided   personal protective equipment utilized  Goal: Optimal Comfort and Wellbeing  Outcome: Ongoing, Progressing  Intervention: Monitor Pain and Promote Comfort  Recent Flowsheet Documentation  Taken 9/13/2024 0611 by Tyler Nevarez RN  Pain Management Interventions:   quiet environment facilitated   position adjusted  Taken 9/13/2024 0410 by Tyler Nevarez RN  Pain Management Interventions:   quiet environment facilitated   position adjusted  Taken 9/13/2024 0228 by Tyler Nevarez RN  Pain Management Interventions:   quiet environment facilitated   position adjusted   pillow support provided  Taken 9/13/2024 0016 by Tyler Nevarez RN  Pain Management Interventions:   position adjusted   quiet environment facilitated  Taken 9/12/2024 2220 by Tyler Nevarez RN  Pain Management Interventions:   position adjusted   quiet environment facilitated  Intervention: Provide Person-Centered Care  Recent Flowsheet Documentation  Taken 9/13/2024 0611 by Tyler Nevarez RN  Trust Relationship/Rapport: care explained  Taken 9/13/2024 0410 by Tyler Nevarez RN  Trust Relationship/Rapport: care explained  Taken 9/13/2024 0228 by Tyler Nevarez RN  Trust Relationship/Rapport:   care explained   reassurance provided  Taken 9/13/2024 0016 by Tyler Nevarez RN  Trust Relationship/Rapport: care explained  Taken 9/12/2024 2220 by Tyler Nevarez RN  Trust Relationship/Rapport:   care explained   thoughts/feelings acknowledged  Taken 9/12/2024 2015 by Tyler Nevarez RN  Trust Relationship/Rapport:   care explained   choices provided   reassurance provided  Goal: Readiness for Transition of Care  Outcome: Ongoing, Progressing     Problem: Skin Injury Risk Increased  Goal: Skin Health and Integrity  Outcome: Ongoing, Progressing  Intervention: Optimize Skin Protection  Recent Flowsheet Documentation  Taken 9/13/2024 0611 by Tyler Nevarez RN  Head of Bed (HOB) Positioning: HOB at 30 degrees  Taken 9/13/2024 0410 by Geri  COREEN Scott  Pressure Reduction Techniques: weight shift assistance provided  Head of Bed (HOB) Positioning: HOB at 20-30 degrees  Pressure Reduction Devices:   pressure-redistributing mattress utilized   positioning supports utilized  Skin Protection:   adhesive use limited   tubing/devices free from skin contact  Taken 9/13/2024 0228 by Tyler Nevarez RN  Pressure Reduction Techniques: weight shift assistance provided  Head of Bed (HOB) Positioning: HOB at 20-30 degrees  Pressure Reduction Devices:   pressure-redistributing mattress utilized   positioning supports utilized  Skin Protection:   adhesive use limited   tubing/devices free from skin contact  Taken 9/13/2024 0016 by Tyler Nevarez RN  Pressure Reduction Techniques: weight shift assistance provided  Head of Bed (HOB) Positioning: HOB at 20-30 degrees  Pressure Reduction Devices:   pressure-redistributing mattress utilized   positioning supports utilized  Skin Protection:   adhesive use limited   tubing/devices free from skin contact  Taken 9/12/2024 2220 by Tyler Nevarez RN  Head of Bed (HOB) Positioning: HOB at 20-30 degrees  Taken 9/12/2024 2015 by Tyler Nevarez RN  Pressure Reduction Techniques:   weight shift assistance provided   heels elevated off bed  Head of Bed (HOB) Positioning: HOB at 20-30 degrees  Pressure Reduction Devices:   positioning supports utilized   pressure-redistributing mattress utilized  Skin Protection:   adhesive use limited   tubing/devices free from skin contact     Problem: Fall Injury Risk  Goal: Absence of Fall and Fall-Related Injury  Outcome: Ongoing, Progressing  Intervention: Identify and Manage Contributors  Recent Flowsheet Documentation  Taken 9/12/2024 2015 by Tyler Nevarez RN  Medication Review/Management: medications reviewed  Intervention: Promote Injury-Free Environment  Recent Flowsheet Documentation  Taken 9/13/2024 0611 by Tyler Nevarez RN  Safety Promotion/Fall Prevention:   activity supervised    assistive device/personal items within reach   clutter free environment maintained   fall prevention program maintained   gait belt   mobility aid in reach   nonskid shoes/slippers when out of bed   room organization consistent   safety round/check completed  Taken 9/13/2024 0410 by Tyler Nevarez RN  Safety Promotion/Fall Prevention:   activity supervised   assistive device/personal items within reach   clutter free environment maintained   fall prevention program maintained   gait belt   mobility aid in reach   nonskid shoes/slippers when out of bed   room organization consistent   safety round/check completed  Taken 9/13/2024 0228 by Tyler Nevarez RN  Safety Promotion/Fall Prevention:   activity supervised   assistive device/personal items within reach   clutter free environment maintained   fall prevention program maintained   gait belt   mobility aid in reach   nonskid shoes/slippers when out of bed   room organization consistent   safety round/check completed  Taken 9/13/2024 0016 by Tyler Nevarez RN  Safety Promotion/Fall Prevention:   activity supervised   assistive device/personal items within reach   clutter free environment maintained   fall prevention program maintained   gait belt   mobility aid in reach   nonskid shoes/slippers when out of bed   room organization consistent   safety round/check completed  Taken 9/12/2024 2220 by Tyler Nevarez RN  Safety Promotion/Fall Prevention:   activity supervised   assistive device/personal items within reach   clutter free environment maintained   fall prevention program maintained   gait belt   mobility aid in reach   nonskid shoes/slippers when out of bed   room organization consistent   safety round/check completed  Taken 9/12/2024 2015 by Tyler Nevarez RN  Safety Promotion/Fall Prevention:   activity supervised   assistive device/personal items within reach   clutter free environment maintained   fall prevention program maintained   gait belt    mobility aid in reach   nonskid shoes/slippers when out of bed   room organization consistent   safety round/check completed     Problem: COPD (Chronic Obstructive Pulmonary Disease) Comorbidity  Goal: Maintenance of COPD Symptom Control  Outcome: Ongoing, Progressing  Intervention: Maintain COPD-Symptom Control  Recent Flowsheet Documentation  Taken 9/12/2024 2015 by Tyler Nevarez RN  Medication Review/Management: medications reviewed     Problem: Diabetes Comorbidity  Goal: Blood Glucose Level Within Targeted Range  Outcome: Ongoing, Progressing     Problem: Hypertension Comorbidity  Goal: Blood Pressure in Desired Range  Outcome: Ongoing, Progressing  Intervention: Maintain Blood Pressure Management  Recent Flowsheet Documentation  Taken 9/12/2024 2015 by Tyler Nevarez RN  Medication Review/Management: medications reviewed     Problem: Fluid Imbalance (Pneumonia)  Goal: Fluid Balance  Outcome: Ongoing, Progressing     Problem: Infection (Pneumonia)  Goal: Resolution of Infection Signs and Symptoms  Outcome: Ongoing, Progressing     Problem: Respiratory Compromise (Pneumonia)  Goal: Effective Oxygenation and Ventilation  Outcome: Ongoing, Progressing  Intervention: Optimize Oxygenation and Ventilation  Recent Flowsheet Documentation  Taken 9/13/2024 0611 by Tyler Nevarez RN  Head of Bed (HOB) Positioning: HOB at 30 degrees  Taken 9/13/2024 0410 by Tyler Nevarez RN  Head of Bed (HOB) Positioning: HOB at 20-30 degrees  Taken 9/13/2024 0228 by Tyler Nevarez RN  Head of Bed (HOB) Positioning: HOB at 20-30 degrees  Taken 9/13/2024 0016 by Tyler Nevarez RN  Head of Bed (HOB) Positioning: HOB at 20-30 degrees  Taken 9/12/2024 2220 by Tyler Nevarez RN  Head of Bed (HOB) Positioning: HOB at 20-30 degrees  Taken 9/12/2024 2015 by Tyler Nevarez RN  Head of Bed (HOB) Positioning: HOB at 20-30 degrees   Goal Outcome Evaluation:         Pt oriented to self only. VSS. Incontinent of B&B. Pt on 2LNC which is his  baseline. Pt cooperating with staff most of the time. Pt uncooperative at times. Q2hr and PRN turn with staff assist.

## 2024-09-13 NOTE — PLAN OF CARE
Goal Outcome Evaluation:  Plan of Care Reviewed With: patient        Progress: no change         Anticipated Discharge Disposition (SLP): skilled nursing facility          SLP Swallowing Diagnosis: mild, oral dysphagia, functional pharyngeal phase (09/13/24 7578)

## 2024-09-13 NOTE — MBS/VFSS/FEES
Acute Care - Speech Language Pathology   Swallow Initial Evaluation  Geraldo  Modified Barium Swallow Study (MBS)       Patient Name: Omkar Nava  : 1955  MRN: [id number]  Today's Date: 2024               Admit Date: 2024    Visit Dx:     ICD-10-CM ICD-9-CM   1. Multifocal pneumonia  J18.9 486   2. COVID-19  U07.1 079.89   3. Hyperkalemia  E87.5 276.7   4. Acute anemia  D64.9 285.9   5. Moderate Alzheimer's dementia with psychotic disturbance, unspecified timing of dementia onset  G30.9 331.0    F02.B2 294.11   6. Oropharyngeal dysphagia  R13.12 787.22   7. 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, without long-term current use of insulin    Orthostatic hypotension    Acute osteomyelitis of left foot    Type 2 diabetes mellitus    Essential hypertension    Psychotic disorder    Neurocognitive disorder    S/P transmetatarsal amputation of foot, left    Hypoxia    Abscess of left foot    Anemia of chronic disease    Aspiration pneumonia    Cellulitis of left toe    Cellulitis of lower extremity    Cervical spine pain    Chronic kidney disease    Closed head injury without loss of consciousness    Dementia    Dental cavities    Diarrhea of presumed infectious origin    Displaced fracture of proximal phalanx of left great toe, initial encounter for open fracture    Dysphagia    Erectile dysfunction    Fall    Gas gangrene    Generalized muscle weakness    Hallucinations    Hypertensive heart and chronic kidney disease 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 failure, with stage 1 through stage 4 chronic kidney disease, or unspecified chronic kidney disease    Insomnia due to medical condition    Iron deficiency anemia    J15.0, ESBL Klebsiella pneumonia    Laceration without foreign body, left foot, subsequent encounter     Long term (current) use of insulin    Other acute osteomyelitis, left ankle and foot    Personal history of Methicillin resistant Staphylococcus aureus infection    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    Protein calorie malnutrition    Simple chronic bronchitis    Tobacco use    Type 2 diabetes mellitus with diabetic neuropathy, unspecified    Wound infection, posttraumatic    Type 2 diabetes mellitus with diabetic chronic kidney disease    Encephalopathy    Bilateral pneumonia    Acute kidney injury superimposed on chronic kidney disease    Acute type 1 respiratory failure    COVID-19 virus detected     Past Medical History:   Diagnosis Date    Anemia     Dementia     Diabetes mellitus     Dysphagia     GERD (gastroesophageal reflux disease)     History of alcohol abuse     History of cocaine use     History of marijuana use     Hypertension     Osteomyelitis     Poor historian     records obtained from nursing home records & his family    Visual impairment      Past Surgical History:   Procedure Laterality Date    AMPUTATION FOOT Left 10/18/2022    Procedure: PARTIAL FIRST RAY AMPUTATION LEFT;  Surgeon: Yeison Petty MD;  Location:  [facility];  Service: Orthopedics;  Laterality: Left;    AMPUTATION FOOT Left 12/5/2022    Procedure: Transmetatarsal of Left Foot;  Surgeon: Yeison Petty MD;  Location:  [facility];  Service: Orthopedics;  Laterality: Left;    ENDOSCOPY N/A 3/14/2024    Procedure: ESOPHAGOGASTRODUODENOSCOPY WITH JEJUNAL TUBE INSERTION AT BEDSIDE;  Surgeon: Brunner, Mark I, MD;  Location:  [facility];  Service: Gastroenterology;  Laterality: N/A;    ENDOSCOPY W/ PEG TUBE PLACEMENT N/A 4/1/2024    Procedure: ESOPHAGOGASTRODUODENOSCOPY WITH PERCUTANEOUS ENDOSCOPIC GASTROSTOMY TUBE INSERTION;  Surgeon: Brunner, Mark I, MD;  Location:  [facility];  Service: Gastroenterology;  Laterality: N/A;  EGD with PEG placement.  Secured at 3.5 cm    EYE SURGERY         SLP Recommendation and Plan  SLP Swallowing  Diagnosis: mild, oral dysphagia, functional pharyngeal phase (09/13/24 1540)  SLP Diet Recommendation: soft to chew textures, ground, thin liquids (09/13/24 1540)  Recommended Precautions and Strategies: small bites of food and sips of liquid, upright posture during/after eating, check mouth frequently for oral residue/pocketing, general aspiration precautions (09/13/24 1540)  SLP Rec. for Method of Medication Administration: meds whole, meds crushed, with puree, as tolerated, meds via alternate route (09/13/24 1540)     Monitor for Signs of Aspiration: yes, notify SLP if any concerns (09/13/24 1540)     Swallow Criteria for Skilled Therapeutic Interventions Met: demonstrates skilled criteria (09/13/24 1540)  Anticipated Discharge Disposition (SLP): skilled nursing facility (09/13/24 1540)  Rehab Potential/Prognosis, Swallowing: adequate, monitor progress closely (09/13/24 1540)  Therapy Frequency (Swallow): 3 days per week (09/13/24 1540)  Predicted Duration Therapy Intervention (Days): 1 week (09/13/24 1540)  Oral Care Recommendations: Oral Care BID/PRN, Toothbrush (09/13/24 1540)                                        Plan of Care Reviewed With: patient  Progress: no change      SWALLOW EVALUATION (Last 72 Hours)       SLP Adult Swallow Evaluation       Row Name 09/13/24 1540 09/12/24 0845                Rehab Evaluation    Document Type evaluation  -CJ evaluation  -CJ       Subjective Information no complaints  -CJ no complaints  -CJ       Patient Observations alert;cooperative  -CJ alert;cooperative  -CJ       Patient/Family/Caregiver Comments/Observations no family present; covid positive  -CJ no family present; covid positive  -CJ       Patient Effort good  -CJ good  -CJ       Symptoms Noted During/After Treatment none  -CJ none  -CJ          General Information    Patient Profile Reviewed yes  -CJ yes  -CJ       Pertinent History Of Current Problem see initial eval; referred for MBS  -CJ Pt adm w/  "hypoxia/covid; sig h/o DM2, HTN, aspiration PNA< dysphagia, neurocog disorder, CKD, osteomyelitis. Imaging revealed multifocal PNA. Was recommended NPO w/ PEG during last admission, was previously on [facility] admission before that. Pt reported \"I drink nectar\"  -CJ       Current Method of Nutrition NPO;gastrostomy feedings  -CJ NPO;gastrostomy feedings  -CJ       Precautions/Limitations, Vision WFL;for purposes of eval  -CJ WFL;for purposes of eval  -CJ       Precautions/Limitations, Hearing WFL;for purposes of eval  -CJ WFL;for purposes of eval  -CJ       Prior Level of Function-Communication cognitive-linguistic impairment  -CJ cognitive-linguistic impairment  -CJ       Prior Level of Function-Swallowing NPO  -CJ NPO  -CJ       Plans/Goals Discussed with patient  -CJ patient  -CJ       Barriers to Rehab medically complex;previous functional deficit;cognitive status  -CJ medically complex;previous functional deficit;cognitive status  -CJ       Patient's Goals for Discharge return to PO diet  -CJ return to PO diet  -CJ          Pain    Additional Documentation Pain Scale: FACES Pre/Post-Treatment (Group)  -CJ Pain Scale: FACES Pre/Post-Treatment (Group)  -CJ          Pain Scale: FACES Pre/Post-Treatment    Pain: FACES Scale, Pretreatment 0-->no hurt  -CJ 0-->no hurt  -CJ       Posttreatment Pain Rating 0-->no hurt  -CJ 0-->no hurt  -CJ          Oral Motor Structure and Function    Dentition Assessment -- missing teeth  -CJ       Secretion Management -- WNL/WFL  -CJ       Mucosal Quality -- moist, healthy  -          Oral Musculature and Cranial Nerve Assessment    Oral Motor General Assessment -- generalized oral motor weakness  -          General Eating/Swallowing Observations    Respiratory Support Currently in Use -- nasal cannula  -CJ       O2 Liters -- 3L  -CJ       Eating/Swallowing Skills -- fed by SLP  -CJ       Positioning During Eating -- upright in bed  -CJ       Utensils Used -- spoon;cup;straw  -    "    Consistencies Trialed -- pureed;ice chips;thin liquids  -CJ          Clinical Swallow Eval    Pharyngeal Phase -- suspected pharyngeal impairment  -CJ          Pharyngeal Phase Concerns    Pharyngeal Phase Concerns -- cough  -CJ       Cough -- thin  -CJ       Pharyngeal Phase Concerns, Comment -- Prev history of silent aspiration and PEG placement. Pt reports was eating/drinking at facility. Will plan to complete MBS w/ further recs pending. Continue NPO w/ PEG  -CJ          MBS/VFSS    Utensils Used spoon;cup;straw  -CJ --       Consistencies Trialed regular textures;thin liquids;pudding thick  -CJ --          MBS/VFSS Interpretation    Oral Prep Phase impaired oral phase of swallowing  -CJ --       Oral Transit Phase impaired  -CJ --       Oral Residue WFL  -CJ --          Oral Preparatory Phase    Oral Preparatory Phase prolonged manipulation;poor rotary chew  -CJ --       Prolonged Manipulation regular textures;secondary to reduced lingual strength;secondary to reduced lingual range of motion  -CJ --       Poor Rotary Chew regular textures;secondary to reduced lingual strength;secondary to reduced lingual range of motion  -CJ --       Oral Preparatory Phase, Comment Prolonged mastication w/ solids and difficulty w/ transit. Will f/u for oral dysphagia tx  -CJ --          Oral Transit Phase    Impaired Oral Transit Phase premature spillage of liquids into pharynx  -CJ --       Premature Spillage of Liquids into Pharynx thin liquids;discoordination of lingual movement  -CJ --          Initiation of Pharyngeal Swallow    Initiation of Pharyngeal Swallow bolus in pyriform sinuses  -CJ --       Pharyngeal Phase impaired pharyngeal phase of swallowing  -CJ --       Anatomical abnormalities noted osteophyte/bone spur per radiology report;prominent cricopharyngeous/ cricopharyngeal bar  -CJ --       Anatomical abnormalities functional impact no functional impact on swallowing  -CJ --       Penetration Before the  Swallow thin liquids;secondary to delayed swallow initiation or mistiming  -CJ --       Depth of Penetration transient;shallow  -CJ --       Rosenbek's Scale thin:;2--->level 2  -CJ --       Pharyngeal Phase, Comment Isolated incident of penetration w/ thins w/ consecutive swallows 2/2 pt noted to hiccup during presentation. No other episodes of laryngeal penetration or aspiration  -CJ --          Esophageal Phase    Esophageal Phase esophageal retention  -CJ --          SLP Evaluation Clinical Impression    SLP Swallowing Diagnosis mild;oral dysphagia;functional pharyngeal phase  -CJ suspected pharyngeal dysphagia  -CJ       Functional Impact risk of aspiration/pneumonia;risk of malnutrition;risk of dehydration  -CJ risk of aspiration/pneumonia;risk of malnutrition;risk of dehydration  -CJ       Rehab Potential/Prognosis, Swallowing adequate, monitor progress closely  -CJ adequate, monitor progress closely  -CJ       Swallow Criteria for Skilled Therapeutic Interventions Met demonstrates skilled criteria  -CJ demonstrates skilled criteria  -          Recommendations    Therapy Frequency (Swallow) 3 days per week  -CJ --       Predicted Duration Therapy Intervention (Days) 1 week  -CJ --       SLP Diet Recommendation soft to chew textures;ground;thin liquids  -CJ NPO;long term alternate methods of nutrition/hydration  until MBS  -       Recommended Diagnostics -- VFSS (MBS)  -       Recommended Precautions and Strategies small bites of food and sips of liquid;upright posture during/after eating;check mouth frequently for oral residue/pocketing;general aspiration precautions  - general aspiration precautions  -       Oral Care Recommendations Oral Care BID/PRN;Toothbrush  -CJ Oral Care BID/PRN;Suction toothbrush  -CJ       SLP Rec. for Method of Medication Administration meds whole;meds crushed;with puree;as tolerated;meds via alternate route  -CJ meds via alternate route  -       Monitor for Signs of  Aspiration yes;notify SLP if any concerns  -CJ yes;notify SLP if any concerns  -CJ       Anticipated Discharge Disposition (SLP) skilled nursing facility  -CJ skilled nursing facility  -CJ                 User Key  (r) = Recorded By, (t) = Taken By, (c) = Cosigned By      Initials Name Effective Dates    ELDER [place] Eliz NAHOMY, MS CCC-SLP 06/03/24 -                     EDUCATION  The patient has been educated in the following areas:   Dysphagia (Swallowing Impairment) Oral Care/Hydration.        SLP GOALS       Row Name 09/13/24 0030             (LTG) Patient will demonstrate functional swallow for    Diet Texture (Demonstrate functional swallow) soft to chew (chopped) textures  -CJ      Liquid viscosity (Demonstrate functional swallow) thin liquids  -CJ      [place] (Demonstrate functional swallow) with minimal cues (75-90% accuracy)  -CJ      Time Frame (Demonstrate functional swallow) 1 week  -CJ      Progress/Outcomes (Demonstrate functional swallow) new goal  -CJ         (STG) Patient will tolerate therapeutic trials of    Consistencies Trialed (Tolerate therapeutic trials) soft to chew (chopped) textures;soft to chew (ground) textures;thin liquids  -CJ      Desired Outcome (Tolerate therapeutic trials) without signs/symptoms of aspiration;with adequate oral prep/transit/clearance;without signs of distress  -CJ      [place] (Tolerate therapeutic trials) with minimal cues (75-90% accuracy)  -CJ      Time Frame (Tolerate therapeutic trials) 1 week  -CJ      Progress/Outcomes (Tolerate therapeutic trials) new goal  -CJ         (STG) Lingual Strengthening Goal 1 (SLP)    Activity (Lingual Strengthening Goal 1, SLP) increase lingual tone/sensation/control/coordination/movement  -CJ      Increase Lingual Tone/Sensation/Control/Coordination/Movement swallow trials;lingual resistance exercises  -CJ      [place]/Accuracy (Lingual Strengthening Goal 1, SLP) with minimal cues (75-90% accuracy)  -CJ      Time  Frame (Lingual Strengthening Goal 1, SLP) 1 week  -      Progress/Outcomes (Lingual Strengthening Goal 1, SLP) new goal  -                User Key  (r) = Recorded By, (t) = Taken By, (c) = Cosigned By      Initials Name Provider Type    Eliz Mooney MS CCC-SLP Speech and Language Pathologist                         Time Calculation:    Time Calculation- SLP       Row Name 09/13/24 1627             Time Calculation- SLP    SLP Start Time 1540  -      SLP Received On 09/13/24  -         Untimed Charges    [id number] Fluoro Eval Swallow Minutes 55  -CJ         Total Minutes    Untimed Charges Total Minutes 55  -CJ       Total Minutes 55  -CJ                User Key  (r) = Recorded By, (t) = Taken By, (c) = Cosigned By      Initials Name Provider Type    Eliz Mooney MS CCC-SLP Speech and Language Pathologist                    Therapy Charges for Today       Code Description Service Date Service Provider Modifiers Qty    [id number] HC ST EVAL ORAL PHARYNG SWALLOW 3 9/12/2024 Eliz Cartagena MS CCC-SLP GN 1    [id number] HC ST MOTION FLUORO EVAL SWALLOW 4 9/13/2024 Eliz Cartagena MS CCC-SLP GN 1                 Eliz Cartagena MS CCC-SLP  9/13/2024

## 2024-09-13 NOTE — PROGRESS NOTES
[facility]    INFECTIOUS DISEASE PROGRESS NOTE    Omkar Nava  1955  [id number]    Date of consult: 9/12/2024    Admit date: 9/11/2024    Requesting Provider: No ref. provider found  Evaluating physician: Rayray Gordon MD  Reason for Consultation: Pneumonia, COVID-19  Chief Complaint: Above      Subjective   History of present illness:  Patient is a  69 y.o.  Yr old male with a history of dementia, diabetes mellitus type 2, blindness, essential hypertension, substance use in the past, nursing home resident at [facility], who was admitted to [facility] on 9/11/2024 with decreased mental status and acute hypoxic respiratory failure.  Radiographs revealed multifocal pneumonia consistent with possible aspiration and patient also tested positive for COVID-19.  Patient is a poor historian.  I was consulted on 9/12/2024 for further evaluation and treatment.    9/13/2024 history reviewed.  Remains confused.  Poor historian.  Tolerating antibiotics for COVID-19 and pneumonia.  No high fever.  Continues cefepime,, doxycycline, remdesivir, and adding oral linezolid given MRSA positive screen.    Past Medical History:   Diagnosis Date    Anemia     Dementia     Diabetes mellitus     Dysphagia     GERD (gastroesophageal reflux disease)     History of alcohol abuse     History of cocaine use     History of marijuana use     Hypertension     Osteomyelitis     Poor historian     records obtained from nursing home records & his family    Visual impairment        Past Surgical History:   Procedure Laterality Date    AMPUTATION FOOT Left 10/18/2022    Procedure: PARTIAL FIRST RAY AMPUTATION LEFT;  Surgeon: Yeison Petty MD;  Location: [facility] OR;  Service: Orthopedics;  Laterality: Left;    AMPUTATION FOOT Left 12/5/2022    Procedure: Transmetatarsal of Left Foot;  Surgeon: Yeison Petty MD;  Location: [facility] OR;  Service: Orthopedics;  Laterality: Left;    ENDOSCOPY N/A  3/14/2024    Procedure: ESOPHAGOGASTRODUODENOSCOPY WITH JEJUNAL TUBE INSERTION AT BEDSIDE;  Surgeon: Brunner, Mark I, MD;  Location:  [facility];  Service: Gastroenterology;  Laterality: N/A;    ENDOSCOPY W/ PEG TUBE PLACEMENT N/A 4/1/2024    Procedure: ESOPHAGOGASTRODUODENOSCOPY WITH PERCUTANEOUS ENDOSCOPIC GASTROSTOMY TUBE INSERTION;  Surgeon: Brunner, Mark I, MD;  Location:  [facility];  Service: Gastroenterology;  Laterality: N/A;  EGD with PEG placement.  Secured at 3.5 cm    EYE SURGERY         Pediatric History   Patient Parents    Not on file     Other Topics Concern    Not on file   Social History Narrative    Caffeine 0-1 servings per day    Patient lives at home .   Previous history of substance use including cocaine and alcohol, not currently, and no current history for cigarettes, nursing home resident    family history includes Diabetes in his brother, brother, father, maternal grandfather, maternal grandmother, mother, and sister; Hypertension in his brother, brother, father, and mother.    No Known Allergies    Immunization History   Administered Date(s) Administered    COVID-19 (MODERNA) 1st,2nd,3rd Dose Monovalent 06/09/2021, 07/19/2021    COVID-19 (MODERNA) Monovalent Original Booster 04/11/2022    COVID-19 F23 (MODERNA) 12YRS+ (SPIKEVAX) 10/16/2023    Fluzone (or Fluarix & Flulaval for VFC) >6mos 11/29/2023       Medication:  @Scheduled Meds:amLODIPine, 10 mg, Per G Tube, Q24H  [Held by provider] ARIPiprazole, 5 mg, Per G Tube, Daily  brimonidine, 1 drop, Both Eyes, TID  carvedilol, 12.5 mg, Per G Tube, Q12H  cefepime, 2,000 mg, Intravenous, Q12H  cloNIDine, 0.2 mg, Per G Tube, Q8H  dexAMETHasone, 6 mg, Per G Tube, Daily   Or  dexAMETHasone, 6 mg, Intravenous, Daily  doxycycline, 100 mg, Per G Tube, Q12H  hydrALAZINE, 100 mg, Per G Tube, Q8H  ipratropium-albuterol, 3 mL, Nebulization, Q6H - RT  lansoprazole, 30 mg, Per G Tube, Q AM  palliative care oral rinse, 5 mL, Mouth/Throat, 4x  "Daily  [Held by provider] QUEtiapine, 25 mg, Per G Tube, Nightly  remdesivir, 100 mg, Intravenous, Q24H  sodium chloride, 10 mL, Intravenous, Q12H  [Held by provider] terazosin, 5 mg, Per G Tube, Q12H  timolol, 1 drop, Both Eyes, BID  [Held by provider] torsemide, 5 mg, Per G Tube, Daily  Valproic Acid, 150 mg, Per G Tube, Q8H      Continuous Infusions:dextrose 5 % and sodium chloride 0.9 %, 50 mL/hr, Last Rate: 50 mL/hr (09/12/24 4064)  Pharmacy Consult - Remdesivir,       PRN Meds:.  acetaminophen    senna-docusate sodium **AND** polyethylene glycol **AND** [DISCONTINUED] bisacodyl **AND** bisacodyl    Calcium Replacement - Follow Nurse / BPA Driven Protocol    [Held by provider] hydrOXYzine    Magnesium Standard Dose Replacement - Follow Nurse / BPA Driven Protocol    Pharmacy Consult - Remdesivir    Phosphorus Replacement - Follow Nurse / BPA Driven Protocol    Potassium Replacement - Follow Nurse / BPA Driven Protocol    sodium chloride    sodium chloride     Please refer to the medical record for a full medication list    Review of Systems: Difficult to obtain secondary to mental status      Physical Exam:   Vital Signs   Temp:  [97.9 °F (36.6 °C)-101 °F (38.3 °C)] 100 °F (37.8 °C)  Heart Rate:  [57-84] 69  Resp:  [16-18] 18  BP: (106-170)/(52-75) 156/52    Blood pressure 156/52, pulse 69, temperature 100 °F (37.8 °C), temperature source Axillary, resp. rate 18, height 182.9 cm (72\"), weight 83.5 kg (184 lb), SpO2 95%.  GENERAL: Awake and alert, in moderate distress. Appears older than stated age.  Resting in bed.  Confused.  HEENT:  Normocephalic, atraumatic.  Oropharynx without thrush. Dentition in good repair. No cervical adenopathy. No neck masses.  Ears externally normal, Nose externally normal.  EYES: No conjunctival injection. No icterus. EOM full.  LYMPHATICS: No lymphadenopathy of the neck or axillary or inguinal regions.   HEART: No murmur, gallop, or pericardial friction rub. Reg rate rhythm, No JVD " at 45 degrees.  LUNGS: Bilateral rales. No respiratory distress, no use of accessory muscles.  ABDOMEN: Soft, nontender, nondistended. No appreciable HSM.  Bowel sounds normal.  SKIN: Warm and dry without cutaneous eruptions.  No nodules.    PSYCHIATRIC: Mental status with some confusion.  EXT:  No cellulitic change.  NEURO: Oriented to name, nonfocal except blindness.      Results Review:   I reviewed the patient's new clinical results.  I reviewed the patient's new imaging results and agree with the interpretation.  I reviewed the patient's other test results and agree with the interpretation    Results from last 7 days   Lab Units 09/13/24  0746 09/12/24  0343 09/11/24  1816   WBC 10*3/mm3 7.80 6.57 6.37   HEMOGLOBIN g/dL 9.2* 8.6* 6.9*   HEMATOCRIT % 28.9* 26.9* 22.4*   PLATELETS 10*3/mm3 184 165 173     Results from last 7 days   Lab Units 09/13/24  0746   SODIUM mmol/L 137   POTASSIUM mmol/L 5.4*   CHLORIDE mmol/L 105   CO2 mmol/L 22.0   BUN mg/dL 30*   CREATININE mg/dL 1.22   GLUCOSE mg/dL 97   CALCIUM mg/dL 9.1     Results from last 7 days   Lab Units 09/13/24  0746   ALK PHOS U/L 96   BILIRUBIN mg/dL 0.2   ALT (SGPT) U/L 70*   AST (SGOT) U/L 36     Results from last 7 days   Lab Units 09/11/24  1816   SED RATE mm/hr 27*     Results from last 7 days   Lab Units 09/13/24  0746   CRP mg/dL 3.67*         Results from last 7 days   Lab Units 09/11/24  1816   LACTATE mmol/L 0.5     Estimated Creatinine Clearance: 69.4 mL/min (by C-G formula based on SCr of 1.22 mg/dL).  CPK          9/13/2024    07:46   Common Labs   Creatine Kinase 308       Procalitonin Results:      Lab 09/11/24  1816   PROCALCITONIN 0.11      Brief Urine Lab Results  (Last result in the past 365 days)        Color   Clarity   Blood   Leuk Est   Nitrite   Protein   CREAT   Urine HCG        09/11/24 1905 Yellow   Clear   Negative   Trace   Negative   Trace                  Site   Date Value Ref Range Status   09/11/2024 Right Radial  Final      Golden's Test   Date Value Ref Range Status   09/11/2024 N/A  Final     pH, Arterial   Date Value Ref Range Status   09/11/2024 7.369 7.350 - 7.450 pH units Final     pCO2, Arterial   Date Value Ref Range Status   09/11/2024 42.2 35.0 - 45.0 mm Hg Final     pO2, Arterial   Date Value Ref Range Status   09/11/2024 131.0 (H) 83.0 - 108.0 mm Hg Final     Comment:     83 Value above reference range     HCO3, Arterial   Date Value Ref Range Status   09/11/2024 24.3 20.0 - 26.0 mmol/L Final     Base Excess, Arterial   Date Value Ref Range Status   09/11/2024 -1.0 (L) 0.0 - 2.0 mmol/L Final     Hemoglobin, Blood Gas   Date Value Ref Range Status   09/11/2024 7.2 (L) 13.5 - 17.5 g/dL Final     Comment:     84 Value below reference range     Hematocrit, Blood Gas   Date Value Ref Range Status   09/11/2024 22.2 (L) 38.0 - 51.0 % Final     Oxyhemoglobin   Date Value Ref Range Status   09/11/2024 97.9 94 - 99 % Final     Methemoglobin   Date Value Ref Range Status   09/11/2024 0.40 0.00 - 1.50 % Final     Carboxyhemoglobin   Date Value Ref Range Status   09/11/2024 1.3 0 - 2 % Final     CO2 Content   Date Value Ref Range Status   09/11/2024 25.6 22 - 33 mmol/L Final     Barometric Pressure for Blood Gas   Date Value Ref Range Status   09/11/2024   Final     Comment:     N/A     Modality   Date Value Ref Range Status   09/11/2024 Room Air  Final     FIO2   Date Value Ref Range Status   09/11/2024 21 % Final        Microbiology:      Results for orders placed or performed during the hospital encounter of 09/11/24   Blood Culture - Blood, Arm, Left    Specimen: Arm, Left; Blood   Result Value Ref Range    Blood Culture No growth at 24 hours               Brief Urine Lab Results  (Last result in the past 365 days)        Color   Clarity   Blood   Leuk Est   Nitrite   Protein   CREAT   Urine HCG        09/11/24 1905 Yellow   Clear   Negative   Trace   Negative   Trace                     Radiology:  Imaging Results (Last 72  Hours)       Procedure Component Value Units Date/Time    CT Chest With Contrast Diagnostic [[id number]] Collected: 09/11/24 1942     Updated: 09/11/24 1953    Narrative:      CT CHEST W CONTRAST DIAGNOSTIC, CT ABDOMEN PELVIS W CONTRAST    Date of Exam: 9/11/2024 7:26 PM EDT    Indication: covid, fever.    Comparison: 3/14/2024    Technique: Axial CT images were obtained of the chest, abdomen and pelvis after the uneventful intravenous administration of 85 cc Isovue-300.  Reconstructed coronal and sagittal images were also obtained. Automated exposure control and iterative   construction methods were used.      Findings:  CT chest:  Lower Neck: Unremarkable.    Hilum and Mediastinum: Scattered prominent mediastinal and perihilar lymph nodes. No pathologically enlarged lymph nodes.  Mild cardiomegaly. Trace pericardial effusion. The esophagus is mildly patulous. No definite suspicious mass.  The vasculature is grossly unremarkable.    Lung Parenchyma and Pleura: Patchy opacities scattered throughout the lungs, that are more consolidative in the lower lobes bilaterally. Trace bilateral pleural effusions. No pneumothorax.  The central airways are patent.    Soft tissues: Unremarkable.    Osseous structures: No acute osseous abnormality.. Mild cortical deformity of the superior endplate of T9 most likely represents a small Schmorl's node, unchanged.    CT abdomen and pelvis:  Liver: Liver is normal in size and CT density. No focal lesions.    Gallbladder: The gallbladder is normal without evidence of radiopaque stones.    Bile Ducts: No billiary dcutal dilation.    Spleen: Spleen is normal in size and CT density.    Pancreas: Pancreas is normal. There is no evidence of pancreatic mass or peripancreatic fluid.    Adrenals: Adrenal glands are unremarkable.    Kidneys: Kidneys are normal in size. There are no stones or hydronephrosis.    Gastrointestinal: Mild to moderate stool burden noted throughout the colon with a small  to moderate-sized stool ball within the rectum. No acute inflammatory changes. No significant distention to suggest bowel obstruction. Mild diverticulosis of the   descending and sigmoid colon without evidence of acute diverticulitis. G-tube is in adequate position. Possible small lipoma within the greater curvature of the stomach.    Bladder: Circumferential bladder wall thickening    Pelvis:  No suspecious mass.    Peritoneum/Mesentery: No fluid collection, ascities, or free air.      Lymph Nodes: No lymphadenopathy.    Vasculature: Unremarkable    Abdominal Wall: Unremarkable    Bony Structures: No acute osseous abnormality. Unchanged grade 1 anterolisthesis of L4 and L5.          Impression:      Impression:  Findings consistent with multifocal pneumonia/pneumonitis, most significantly involving the lower lobes bilaterally.    No definite acute intra-abdominal intrapelvic abnormality.    Mild circumferential bladder wall thickening is nonspecific but may represent cystitis. Please correlate with urinalysis.    Evidence of possible fecal impaction with associated mild constipation.      Electronically Signed: Ruben Medina DO    9/11/2024 7:50 PM EDT    Workstation ID: [id number]    CT Abdomen Pelvis With Contrast [[id number]] Collected: 09/11/24 1942     Updated: 09/11/24 1953    Narrative:      CT CHEST W CONTRAST DIAGNOSTIC, CT ABDOMEN PELVIS W CONTRAST    Date of Exam: 9/11/2024 7:26 PM EDT    Indication: covid, fever.    Comparison: 3/14/2024    Technique: Axial CT images were obtained of the chest, abdomen and pelvis after the uneventful intravenous administration of 85 cc Isovue-300.  Reconstructed coronal and sagittal images were also obtained. Automated exposure control and iterative   construction methods were used.      Findings:  CT chest:  Lower Neck: Unremarkable.    Hilum and Mediastinum: Scattered prominent mediastinal and perihilar lymph nodes. No pathologically enlarged lymph nodes.  Mild  cardiomegaly. Trace pericardial effusion. The esophagus is mildly patulous. No definite suspicious mass.  The vasculature is grossly unremarkable.    Lung Parenchyma and Pleura: Patchy opacities scattered throughout the lungs, that are more consolidative in the lower lobes bilaterally. Trace bilateral pleural effusions. No pneumothorax.  The central airways are patent.    Soft tissues: Unremarkable.    Osseous structures: No acute osseous abnormality.. Mild cortical deformity of the superior endplate of T9 most likely represents a small Schmorl's node, unchanged.    CT abdomen and pelvis:  Liver: Liver is normal in size and CT density. No focal lesions.    Gallbladder: The gallbladder is normal without evidence of radiopaque stones.    Bile Ducts: No billiary dcutal dilation.    Spleen: Spleen is normal in size and CT density.    Pancreas: Pancreas is normal. There is no evidence of pancreatic mass or peripancreatic fluid.    Adrenals: Adrenal glands are unremarkable.    Kidneys: Kidneys are normal in size. There are no stones or hydronephrosis.    Gastrointestinal: Mild to moderate stool burden noted throughout the colon with a small to moderate-sized stool ball within the rectum. No acute inflammatory changes. No significant distention to suggest bowel obstruction. Mild diverticulosis of the   descending and sigmoid colon without evidence of acute diverticulitis. G-tube is in adequate position. Possible small lipoma within the greater curvature of the stomach.    Bladder: Circumferential bladder wall thickening    Pelvis:  No suspecious mass.    Peritoneum/Mesentery: No fluid collection, ascities, or free air.      Lymph Nodes: No lymphadenopathy.    Vasculature: Unremarkable    Abdominal Wall: Unremarkable    Bony Structures: No acute osseous abnormality. Unchanged grade 1 anterolisthesis of L4 and L5.          Impression:      Impression:  Findings consistent with multifocal pneumonia/pneumonitis, most  significantly involving the lower lobes bilaterally.    No definite acute intra-abdominal intrapelvic abnormality.    Mild circumferential bladder wall thickening is nonspecific but may represent cystitis. Please correlate with urinalysis.    Evidence of possible fecal impaction with associated mild constipation.      Electronically Signed: Ruben Medina DO    9/11/2024 7:50 PM EDT    Workstation ID: [id number]    CT Head Without Contrast [[id number]] Collected: 09/11/24 1940     Updated: 09/11/24 1945    Narrative:      CT HEAD WO CONTRAST    Date of Exam: 9/11/2024 7:26 PM EDT    Indication: possible sz.    Comparison: 3/11/2024    Technique: Axial CT images were obtained of the head without contrast administration.  Automated exposure control and iterative construction methods were used.      Findings:  There is no evidence of hemorrhage. There is no mass effect or midline shift. Diffuse brain atrophy with chronic microvascular ischemic changes    There is no extracerebral collection.    Ventricles are normal in size and configuration for patient's stated age.     Posterior fossa is within normal limits.    Calvarium and skull base appear intact. Mucosal thickening in the bilateral maxillary sinuses and ethmoids. Small air-fluid level left sphenoid. Stable chronic findings with possible surgical change in the right globe.        Impression:      Impression:  No acute intracranial abnormality        Electronically Signed: Aramis Clark MD    9/11/2024 7:42 PM EDT    Workstation ID: [id number]            IMPRESSION:     Pneumonia, likely aspiration plus possible secondary bacterial infection from COVID-19.  COVID-19 tested + 9/11/2024.  Organisms to consider for secondary bacterial infection include gram-negative rods, Streptococcus, Staphylococcus.  Does have a slightly increased risk for infection with ESBL organism but this was more distant.  Less likely atypical Legionella.  MRSA swab positive from 9/12.   Legionella and Streptococcus negative.  COVID-19 infection + 9/11/2024.  Encephalopathy, toxic and metabolic related to above issues.  Acute hypoxic respiratory failure, was on 3 L/min nasal cannula on 9/12.  Dementia, affecting all aspects of care.  Diabetes mellitus type 2 with increased risk for infection.  Blindness, affecting all aspects of care.  History of substance use with cocaine and alcohol in the past.  Anemia, chronic disease and related to above issues.  Hyperkalemia 5.4.  Elevated ALT 78 on 9/12.  Related to above issues and medications.  Fever related to above issues.  Urinalysis benign from 9/11.    PLAN:    Diagnostically, continue to follow patient's physical exam, CBC, CMP, CRP, cultures which have been obtained, sooner if available.  Radiographic studies.  Therapeutically, continue cefepime, add linezolid given the MRSA positive, (trying to avoid vancomycin with renal failure) and doxycycline pending further culture data.  Discontinue eravacycline.  Cefepime can be associated with altered mental status in patients with renal failure and will need to be observed closely.  Duration of antibiotics to be determined.  Continue remdesivir for up to 5 days depending upon clinical course.  Continue Decadron for 5 days depending upon clinical course.  Watch for drug interaction between brimonidine and linezolid.  Supportive care.  3 L/min nasal cannula oxygen on 9/12/2024.  4 L nasal cannula 9/13.    I discussed the patient's findings and my recommendations with patient and nursing staff    Thank you for asking me to see Omkar Nava.  Our group would be pleased to follow this patient over the course of their hospitalization and assist with outpatient antimicrobial therapy, as indicated.  Further recommendations depend on the results of the cultures and clinical course.  Side effects of medications discussed.  The patient is at increased risk for adverse drug reactions, complications of IV access, and  readmission.  See next on Monday, call sooner if needed.    Rayray Gordon MD  9/13/2024

## 2024-09-13 NOTE — CASE MANAGEMENT/SOCIAL WORK
Continued Stay Note  [facility]     Patient Name: Omkar Nava  MRN: [id number]  Today's Date: 9/13/2024    Admit Date: 9/11/2024    Plan: return to [facility]   Discharge Plan       Row Name 09/13/24 1054       Plan    Plan Comments Plan remains for pt to return to his LTC bed at [facility] once medically ready. CM to follow                   Discharge Codes    No documentation.                       Chely Almonte RN

## 2024-09-13 NOTE — PROGRESS NOTES
[facility] Medicine Services  PROGRESS NOTE    Patient Name: Omkar Nava  : 1955  MRN: [id number]    Date of Admission: 2024  Primary Care Physician: Alberto Dye MD    Subjective   Subjective     CC:  AMS    HPI:  Fever 101 at 10:30 this AM.  Patient unable to give history d/t baseline confusion.       Objective   Objective     Vital Signs:   Temp:  [97.9 °F (36.6 °C)-101 °F (38.3 °C)] 99.2 °F (37.3 °C)  Heart Rate:  [57-84] 69  Resp:  [16-18] 18  BP: (154-170)/(52-74) 154/74  Flow (L/min):  [2-4] 4     Physical Exam:  Constitutional: No acute distress, awake, alert  HENT: NCAT, mucous membranes moist  Respiratory: Clear to auscultation bilaterally, respiratory effort normal on 4LNC  Cardiovascular: RRR, no murmurs, rubs, or gallops  Gastrointestinal: Positive bowel sounds, soft, nontender, nondistended  Musculoskeletal: No bilateral ankle edema  Psychiatric: Appropriate affect, cooperative  Neurologic: confused  Skin: No rashes      Results Reviewed:  LAB RESULTS:      Lab 24  0746 24  0343 24  1816   WBC 7.80 6.57 6.37   HEMOGLOBIN 9.2* 8.6* 6.9*   HEMATOCRIT 28.9* 26.9* 22.4*   PLATELETS 184 165 173   NEUTROS ABS 5.84 4.32 4.42   IMMATURE GRANS (ABS) 0.03 0.02 0.03   LYMPHS ABS 1.28 1.17 0.92   MONOS ABS 0.61 1.04* 0.94*   EOS ABS 0.03 0.01 0.04   MCV 96.0 96.1 100.9*   SED RATE  --   --  27*   CRP 3.67*  --  1.64*   PROCALCITONIN  --   --  0.11   LACTATE  --   --  0.5         Lab 24  0746 24  0725 24  0342 24  1816   SODIUM 137  --  134* 132*   POTASSIUM 5.4* 5.3* 6.3* 6.4*   CHLORIDE 105  --  104 101   CO2 22.0  --  19.0* 22.0   ANION GAP 10.0  --  11.0 9.0   BUN 30*  --  34* 40*   CREATININE 1.22  --  1.16 1.62*   EGFR 65.0  --  69.0 46.2*   GLUCOSE 97  --  75 87   CALCIUM 9.1  --  8.8 8.7   MAGNESIUM  --   --  1.9 2.2         Lab 24  0746 24  0342 24  1816   TOTAL PROTEIN 6.5 6.8 6.8   ALBUMIN 3.6 3.3*  3.5   GLOBULIN 2.9 3.5 3.3   ALT (SGPT) 70* 78* 85*   AST (SGOT) 36 37 37   BILIRUBIN 0.2 0.2 <0.2   ALK PHOS 96 91 93                 Lab 09/11/24 2005 09/11/24  1816   IRON  --  26*   IRON SATURATION (TSAT)  --  7*   TIBC  --  355   TRANSFERRIN  --  238   FERRITIN  --  308.30   FOLATE  --  12.10   VITAMIN B 12  --  1,158*   ABO TYPING O  --    RH TYPING Positive  --    ANTIBODY SCREEN Negative  --          Lab 09/11/24 2151   PH, ARTERIAL 7.369   PCO2, ARTERIAL 42.2   PO2 .0*   FIO2 21   HCO3 ART 24.3   BASE EXCESS ART -1.0*   CARBOXYHEMOGLOBIN 1.3     Brief Urine Lab Results  (Last result in the past 365 days)        Color   Clarity   Blood   Leuk Est   Nitrite   Protein   CREAT   Urine HCG        09/11/24 1905 Yellow   Clear   Negative   Trace   Negative   Trace                   Microbiology Results Abnormal       Procedure Component Value - Date/Time    Blood Culture - Blood, Arm, Right [[id number]]  (Normal) Collected: 09/11/24 2144    Lab Status: Preliminary result Specimen: Blood from Arm, Right Updated: 09/12/24 2246     Blood Culture No growth at 24 hours    Narrative:      Less than seven (7) mL's of blood was collected.  Insufficient quantity may yield false negative results.    Blood Culture - Blood, Arm, Left [[id number]]  (Normal) Collected: 09/11/24 2144    Lab Status: Preliminary result Specimen: Blood from Arm, Left Updated: 09/12/24 2246     Blood Culture No growth at 24 hours    Narrative:      Less than seven (7) mL's of blood was collected.  Insufficient quantity may yield false negative results.    S. Pneumo Ag Urine or CSF - Urine, Urine, Clean Catch [[id number]]  (Normal) Collected: 09/12/24 1158    Lab Status: Final result Specimen: Urine, Clean Catch Updated: 09/12/24 1902     Strep Pneumo Ag Negative    Legionella Antigen, Urine - Urine, Urine, Clean Catch [[id number]]  (Normal) Collected: 09/12/24 1156    Lab Status: Final result Specimen: Urine, Clean Catch Updated: 09/12/24 1902      LEGIONELLA ANTIGEN, URINE Negative            FL Video Swallow With Speech Single Contrast    Result Date: 9/13/2024  FL VIDEO SWALLOW W SPEECH SINGLE-CONTRAST Date of Exam: 9/13/2024 3:30 PM EDT Indication: dysphagia.   Comparison: None available. Technique:   The speech pathologist administered food and/or liquid mixed with barium to the patient with cine/video imaging.  Imaging assistance was provided to the speech pathologist and an image was saved. Fluoroscopic Time: 1 minute and 24 seconds Number of Images: 6 associated fluoroscopic loops were saved Findings: No aspiration was seen during fluoroscopic guided modified barium swallowing series. Please see speech therapy report for full details and recommendations.     Impression: Impression: Fluoroscopy provided for a modified barium swallow. No aspiration was seen during swallowing evaluation. Please see speech therapy report for full details and recommendations. Report dictated by: Maggi Silverman PA-c  I have personally reviewed this case and agree with the findings above: Electronically Signed: Anjum Prado MD  9/13/2024 3:57 PM EDT  Workstation ID: [id number]    CT Chest With Contrast Diagnostic    Result Date: 9/11/2024  CT CHEST W CONTRAST DIAGNOSTIC, CT ABDOMEN PELVIS W CONTRAST Date of Exam: 9/11/2024 7:26 PM EDT Indication: covid, fever. Comparison: 3/14/2024 Technique: Axial CT images were obtained of the chest, abdomen and pelvis after the uneventful intravenous administration of 85 cc Isovue-300.  Reconstructed coronal and sagittal images were also obtained. Automated exposure control and iterative construction methods were used. Findings: CT chest: Lower Neck: Unremarkable. Hilum and Mediastinum: Scattered prominent mediastinal and perihilar lymph nodes. No pathologically enlarged lymph nodes. Mild cardiomegaly. Trace pericardial effusion. The esophagus is mildly patulous. No definite suspicious mass. The vasculature is grossly unremarkable. Lung  Parenchyma and Pleura: Patchy opacities scattered throughout the lungs, that are more consolidative in the lower lobes bilaterally. Trace bilateral pleural effusions. No pneumothorax. The central airways are patent. Soft tissues: Unremarkable. Osseous structures: No acute osseous abnormality.. Mild cortical deformity of the superior endplate of T9 most likely represents a small Schmorl's node, unchanged. CT abdomen and pelvis: Liver: Liver is normal in size and CT density. No focal lesions. Gallbladder: The gallbladder is normal without evidence of radiopaque stones. Bile Ducts: No billiary dcutal dilation. Spleen: Spleen is normal in size and CT density. Pancreas: Pancreas is normal. There is no evidence of pancreatic mass or peripancreatic fluid. Adrenals: Adrenal glands are unremarkable. Kidneys: Kidneys are normal in size. There are no stones or hydronephrosis. Gastrointestinal: Mild to moderate stool burden noted throughout the colon with a small to moderate-sized stool ball within the rectum. No acute inflammatory changes. No significant distention to suggest bowel obstruction. Mild diverticulosis of the descending and sigmoid colon without evidence of acute diverticulitis. G-tube is in adequate position. Possible small lipoma within the greater curvature of the stomach. Bladder: Circumferential bladder wall thickening Pelvis:  No suspecious mass. Peritoneum/Mesentery: No fluid collection, ascities, or free air.   Lymph Nodes: No lymphadenopathy. Vasculature: Unremarkable Abdominal Wall: Unremarkable Bony Structures: No acute osseous abnormality. Unchanged grade 1 anterolisthesis of L4 and L5.     Impression: Impression: Findings consistent with multifocal pneumonia/pneumonitis, most significantly involving the lower lobes bilaterally. No definite acute intra-abdominal intrapelvic abnormality. Mild circumferential bladder wall thickening is nonspecific but may represent cystitis. Please correlate with  urinalysis. Evidence of possible fecal impaction with associated mild constipation. Electronically Signed: Ruben Medina DO  9/11/2024 7:50 PM EDT  Workstation ID: [id number]    CT Abdomen Pelvis With Contrast    Result Date: 9/11/2024  CT CHEST W CONTRAST DIAGNOSTIC, CT ABDOMEN PELVIS W CONTRAST Date of Exam: 9/11/2024 7:26 PM EDT Indication: covid, fever. Comparison: 3/14/2024 Technique: Axial CT images were obtained of the chest, abdomen and pelvis after the uneventful intravenous administration of 85 cc Isovue-300.  Reconstructed coronal and sagittal images were also obtained. Automated exposure control and iterative construction methods were used. Findings: CT chest: Lower Neck: Unremarkable. Hilum and Mediastinum: Scattered prominent mediastinal and perihilar lymph nodes. No pathologically enlarged lymph nodes. Mild cardiomegaly. Trace pericardial effusion. The esophagus is mildly patulous. No definite suspicious mass. The vasculature is grossly unremarkable. Lung Parenchyma and Pleura: Patchy opacities scattered throughout the lungs, that are more consolidative in the lower lobes bilaterally. Trace bilateral pleural effusions. No pneumothorax. The central airways are patent. Soft tissues: Unremarkable. Osseous structures: No acute osseous abnormality.. Mild cortical deformity of the superior endplate of T9 most likely represents a small Schmorl's node, unchanged. CT abdomen and pelvis: Liver: Liver is normal in size and CT density. No focal lesions. Gallbladder: The gallbladder is normal without evidence of radiopaque stones. Bile Ducts: No billiary dcutal dilation. Spleen: Spleen is normal in size and CT density. Pancreas: Pancreas is normal. There is no evidence of pancreatic mass or peripancreatic fluid. Adrenals: Adrenal glands are unremarkable. Kidneys: Kidneys are normal in size. There are no stones or hydronephrosis. Gastrointestinal: Mild to moderate stool burden noted throughout the colon with a  small to moderate-sized stool ball within the rectum. No acute inflammatory changes. No significant distention to suggest bowel obstruction. Mild diverticulosis of the descending and sigmoid colon without evidence of acute diverticulitis. G-tube is in adequate position. Possible small lipoma within the greater curvature of the stomach. Bladder: Circumferential bladder wall thickening Pelvis:  No suspecious mass. Peritoneum/Mesentery: No fluid collection, ascities, or free air.   Lymph Nodes: No lymphadenopathy. Vasculature: Unremarkable Abdominal Wall: Unremarkable Bony Structures: No acute osseous abnormality. Unchanged grade 1 anterolisthesis of L4 and L5.     Impression: Impression: Findings consistent with multifocal pneumonia/pneumonitis, most significantly involving the lower lobes bilaterally. No definite acute intra-abdominal intrapelvic abnormality. Mild circumferential bladder wall thickening is nonspecific but may represent cystitis. Please correlate with urinalysis. Evidence of possible fecal impaction with associated mild constipation. Electronically Signed: Ruben Medina DO  9/11/2024 7:50 PM EDT  Workstation ID: [id number]    CT Head Without Contrast    Result Date: 9/11/2024  CT HEAD WO CONTRAST Date of Exam: 9/11/2024 7:26 PM EDT Indication: possible sz. Comparison: 3/11/2024 Technique: Axial CT images were obtained of the head without contrast administration.  Automated exposure control and iterative construction methods were used. Findings: There is no evidence of hemorrhage. There is no mass effect or midline shift. Diffuse brain atrophy with chronic microvascular ischemic changes There is no extracerebral collection. Ventricles are normal in size and configuration for patient's stated age. Posterior fossa is within normal limits. Calvarium and skull base appear intact. Mucosal thickening in the bilateral maxillary sinuses and ethmoids. Small air-fluid level left sphenoid. Stable chronic  findings with possible surgical change in the right globe.     Impression: Impression: No acute intracranial abnormality Electronically Signed: Aramis Clark MD  9/11/2024 7:42 PM EDT  Workstation ID: [id number]     Results for orders placed during the hospital encounter of 01/15/24    Adult Transthoracic Echo Complete With Contrast if Necessary Per Protocol    Interpretation Summary    Left ventricular ejection fraction appears to be 56 - 60%.    Left ventricular wall thickness is consistent with hypertrophy.    Estimated right ventricular systolic pressure from tricuspid regurgitation is mildly elevated (35-45 mmHg).    There is a small (1-2cm) pericardial effusion.    No evidence for pericardial tamponade      Current medications:  Scheduled Meds:amLODIPine, 10 mg, Per G Tube, Q24H  [Held by provider] ARIPiprazole, 5 mg, Per G Tube, Daily  brimonidine, 1 drop, Both Eyes, TID  carvedilol, 12.5 mg, Per G Tube, Q12H  cefepime, 2,000 mg, Intravenous, Q12H  cloNIDine, 0.2 mg, Per G Tube, Q8H  dexAMETHasone, 6 mg, Per G Tube, Daily   Or  dexAMETHasone, 6 mg, Intravenous, Daily  doxycycline, 100 mg, Per G Tube, Q12H  hydrALAZINE, 100 mg, Per G Tube, Q8H  ipratropium-albuterol, 3 mL, Nebulization, Q6H - RT  lansoprazole, 30 mg, Per G Tube, Q AM  linezolid, 600 mg, Oral, BID  palliative care oral rinse, 5 mL, Mouth/Throat, 4x Daily  [Held by provider] QUEtiapine, 25 mg, Per G Tube, Nightly  remdesivir, 100 mg, Intravenous, Q24H  sodium chloride, 10 mL, Intravenous, Q12H  [Held by provider] terazosin, 5 mg, Per G Tube, Q12H  timolol, 1 drop, Both Eyes, BID  [Held by provider] torsemide, 5 mg, Per G Tube, Daily  Valproic Acid, 150 mg, Per G Tube, Q8H      Continuous Infusions:dextrose 5 % and sodium chloride 0.9 %, 50 mL/hr, Last Rate: 50 mL/hr (09/13/24 1218)  Pharmacy Consult - Remdesivir,       PRN Meds:.  acetaminophen    senna-docusate sodium **AND** polyethylene glycol **AND** [DISCONTINUED] bisacodyl **AND**  bisacodyl    Calcium Replacement - Follow Nurse / BPA Driven Protocol    [Held by provider] hydrOXYzine    Magnesium Standard Dose Replacement - Follow Nurse / BPA Driven Protocol    Pharmacy Consult - Remdesivir    Phosphorus Replacement - Follow Nurse / BPA Driven Protocol    Potassium Replacement - Follow Nurse / BPA Driven Protocol    sodium chloride    sodium chloride    Assessment & Plan   Assessment & Plan     Active Hospital Problems    Diagnosis  POA    **Hypoxia [R09.02]  Yes    COVID-19 virus detected [U07.1]  Unknown    Bilateral pneumonia [J18.9]  Yes    Encephalopathy [G93.40]  Yes    Chronic kidney disease [N18.9]  Yes    Aspiration pneumonia [J69.0]  Yes    Dementia [F03.90]  Yes    Anemia of chronic disease [D63.8]  Yes    Neurocognitive disorder [R41.9]  Yes    Essential hypertension [I10]  Yes    Type 2 diabetes mellitus [E11.9]  Yes    Iron deficiency anemia [D50.9]  Yes      Resolved Hospital Problems   No resolved problems to display.        Brief Hospital Course to date:  Omkar Nava is a 69 y.o. male     Acute hypoxia  Sepsis secondary to multifocal pneumonia  History of ESBL Klebsiella  COVID-19 positive  -Presented tachypneic, febrile, new oxygen requirement of 3 L.  COVID test positive day of admission.  Multifocal pneumonia on CT scan.  Chronic aspiration with PEG tube in place.  Suspect findings related to chronic aspiration and acute COVID, however, but note history of severe respiratory failure and ESBL klebsiella  -partner d/w Dr. Cj Cody the night of admit possibility of nuzyga since h/o ESBL colonization  and can't give merrem d/t depakote.  He states that we don't carry it though and the eravacycline may not penetrate lungs/more for abdominal infections  -ID/Dr. Gordon following now  Who states that likely aspiration pna + possible secondary bacterial infection d/t COVID.  States that slightly increased risk but ESBL but more distant.  He recs cefepime and doxy for now.      -remdesivir + decadron day 2  -F/u blood and sputum cx, obtain induced sputum if unable to cough up secretions, f/u pathogen specific antigens  -Aggressive bronchopulmonary hygiene, IS, OPEP, suctioning  -+MRSA swab  -oxygen requirement increased.  Will check CXR     Acute on chronic anemia  -Baseline hemoglobin near 8.  He is 6.9 on presentation.  No signs of bleeding at this time.  MCV is elevated.  Fecal occult in ED is negative.  Suspect related to acute on chronic illness  - B12 and folate ok.  Iron studies c/w anemia of chronic disease  -S/p 1u PRBC on 9/11/24     Hyperkalemia  -K 6.4 on admission, received treatment in ED including Ca gluconate. Better but still a little high today.       JAYY on CKD  -Cr 1.62 on admission but has fluctuated significantly over the last year. Last value 0.92 5 months ago. Will monitor output, hold nephrotoxic meds  -better with IVF     Chronic aspiration  -PEG tube in place  -doing ok back on TF's except now with Fever this AM and requiring more oxygen, ?aspirated TF's??     HFpEF  HTN  -appears compensated at this time. Holding home coreg in favor of remdesivir given HR 60s, if stable can add back in. Holding torsemide, clonidine, terazosin, and hydralazine in setting of sepsis. Daily wts and I+O     T2DM with hypoglycemia  --resolved back on TF's     Dementia with agitation  Seizure disorder?  -Given decreased responsiveness leading to initial presentation we will hold most of sedating meds for now.  Placed on depakote for agitation per neuro in past?  -Add back other sedatives as needed        Expected Discharge Location and Transportation: has LTC bed on hold at [facility]  Expected Discharge   Expected Discharge Date: 9/16/2024; Expected Discharge Time:      VTE Prophylaxis:  Mechanical VTE prophylaxis orders are present.         AM-PAC 6 Clicks Score (PT): 10 (09/12/24 2015)    CODE STATUS:   Code Status and Medical Interventions: CPR (Attempt to Resuscitate); Full  Support; Cannot reach family, deferring to prior   Ordered at: 09/12/24 0003     Code Status (Patient has no pulse and is not breathing):    CPR (Attempt to Resuscitate)     Medical Interventions (Patient has pulse or is breathing):    Full Support     Comments:    Cannot reach family, deferring to prior       Yash Danielson MD  09/13/24

## 2024-09-13 NOTE — PROGRESS NOTES
Clinical Nutrition     Patient Name: Omkar Nava  YOB: 1955  MRN: [id number]  Date of Encounter: 09/13/24 19:37 EDT  Admission date: 9/11/2024  Reason for Visit: Follow-up protocol, EN    Assessment   Nutrition Assessment   Admission Diagnosis:  Hypoxia [R09.02]    Problem List:    Hypoxia    Type 2 diabetes mellitus    Essential hypertension    Neurocognitive disorder    Anemia of chronic disease    Aspiration pneumonia    Chronic kidney disease    Dementia    Iron deficiency anemia    Encephalopathy    Bilateral pneumonia    COVID-19 virus detected      PMH:   He  has a past medical history of Anemia, Dementia, Diabetes mellitus, Dysphagia, GERD (gastroesophageal reflux disease), History of alcohol abuse, History of cocaine use, History of marijuana use, Hypertension, Osteomyelitis, Poor historian, and Visual impairment.    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    Substance history: Etoh abuse, cocaine, tobacco    SKIN: bruised, L foot s/p metatarsal amputations 2022    SLP Recommendation and Plan/ MBS  SLP Swallowing Diagnosis: mild, oral dysphagia, functional pharyngeal phase (09/13/24 1540)  SLP Diet Recommendation: soft to chew textures, ground, thin liquids (09/13/24 1540)  Recommended Precautions and Strategies: small bites of food and sips of liquid, upright posture during/after eating, check mouth frequently for oral residue/pocketing, general aspiration precautions (09/13/24 1540)  SLP Rec. for Method of Medication Administration: meds whole, meds crushed, with puree, as tolerated, meds via alternate route (09/13/24 1540)    Labs    Labs Reviewed: Yes    Results from last 7 days   Lab Units 09/13/24  0746 09/12/24  0725 09/12/24  0342 09/11/24  1816   GLUCOSE mg/dL 97  --  75  87   BUN mg/dL 30*  --  34* 40*   CREATININE mg/dL 1.22  --  1.16 1.62*   SODIUM mmol/L 137  --  134* 132*   CHLORIDE mmol/L 105  --  104 101   POTASSIUM mmol/L 5.4* 5.3* 6.3* 6.4*   MAGNESIUM mg/dL  --   --  1.9 2.2   ALT (SGPT) U/L 70*  --  78* 85*   LACTATE mmol/L  --   --   --  0.5       Results from last 7 days   Lab Units 09/13/24  0746 09/12/24  0342 09/11/24  1816   ALBUMIN g/dL 3.6 3.3* 3.5   CRP mg/dL 3.67*  --  1.64*       Results from last 7 days   Lab Units 09/13/24  1838 09/13/24  1152 09/13/24  0545 09/13/24  0022 09/12/24  1827 09/12/24  1533   GLUCOSE mg/dL 246* 170* 103 131* 159* 139*     Lab Results   Lab Value Date/Time    HGBA1C 7.00 (H) 10/16/2022 0432    HGBA1C 8.7 (H) 06/25/2022 0242    HGBA1C 13.4 04/14/2022 1058               Medications    Medications Reviewed: yes    Scheduled Meds:amLODIPine, 10 mg, Per G Tube, Q24H  [Held by provider] ARIPiprazole, 5 mg, Per G Tube, Daily  brimonidine, 1 drop, Both Eyes, TID  carvedilol, 12.5 mg, Per G Tube, Q12H  cefepime, 2,000 mg, Intravenous, Q12H  cloNIDine, 0.2 mg, Per G Tube, Q8H  dexAMETHasone, 6 mg, Per G Tube, Daily   Or  dexAMETHasone, 6 mg, Intravenous, Daily  doxycycline, 100 mg, Per G Tube, Q12H  hydrALAZINE, 100 mg, Per G Tube, Q8H  ipratropium-albuterol, 3 mL, Nebulization, Q6H - RT  lansoprazole, 30 mg, Per G Tube, Q AM  linezolid, 600 mg, Oral, BID  palliative care oral rinse, 5 mL, Mouth/Throat, 4x Daily  [Held by provider] QUEtiapine, 25 mg, Per G Tube, Nightly  remdesivir, 100 mg, Intravenous, Q24H  sodium chloride, 10 mL, Intravenous, Q12H  [Held by provider] terazosin, 5 mg, Per G Tube, Q12H  timolol, 1 drop, Both Eyes, BID  [Held by provider] torsemide, 5 mg, Per G Tube, Daily  Valproic Acid, 150 mg, Per G Tube, Q8H      Continuous Infusions:Pharmacy Consult - Remdesivir,       PRN Meds:.  acetaminophen    senna-docusate sodium **AND** polyethylene glycol **AND** [DISCONTINUED] bisacodyl **AND** bisacodyl    Calcium Replacement -  "Follow Nurse / BPA Driven Protocol    [Held by provider] hydrOXYzine    Magnesium Standard Dose Replacement - Follow Nurse / BPA Driven Protocol    Pharmacy Consult - Remdesivir    Phosphorus Replacement - Follow Nurse / BPA Driven Protocol    Potassium Replacement - Follow Nurse / BPA Driven Protocol    sodium chloride    sodium chloride    Intake/Ouptut 24 hrs (0701 - 0700)   I&O's Reviewed: yes    Intake & Output (last day)         09/13 0701 09/14 0700    P.O. 50    I.V. (mL/kg) 1202.8 (14.4)    Other 462    NG/    IV Piggyback 100    Total Intake(mL/kg) 2108.8 (25.3)    Urine (mL/kg/hr) 300 (0.3)    Stool 0    Total Output 300    Net +1808.8         Urine Unmeasured Occurrence 3 x    Stool Unmeasured Occurrence 1 x             Anthropometrics     Height: Height: 182.9 cm (72\")  Last Filed Weight: Weight: 83.5 kg (184 lb) (09/13/24 0600)  Method: Weight Method: Bed scale  BMI: BMI (Calculated): 24.9    UBW: per [place] [facility] pt weighed 186.2lb 9-11-24/ BMI 25    Weight change:  alternating wt's suspect 2nd fluid      Weight      Weight (kg) Weight (lbs) Weight Method   1/18/2024 85.548 kg  188 lb 9.6 oz  Bed scale    1/19/2024 82.691 kg  182 lb 4.8 oz  Bed scale    1/20/2024 83.054 kg  183 lb 1.6 oz  Bed scale    1/21/2024 85.14 kg  187 lb 11.2 oz  Bed scale    2/5/2024 85.1 kg  187 lb 9.8 oz  Estimated    2/22/2024 84.823 kg  187 lb     3/10/2024 84.823 kg  187 lb  Stated     98.1 kg  216 lb 4.3 oz  Bed scale    3/11/2024 90 kg  198 lb 6.6 oz  Bed scale    3/12/2024 93.7 kg  206 lb 9.1 oz  Bed scale    3/13/2024 92.8 kg  204 lb 9.4 oz  Bed scale    3/15/2024 92 kg  202 lb 13.2 oz     3/16/2024 91.1 kg  200 lb 13.4 oz     3/17/2024 98.8 kg  217 lb 13 oz     3/20/2024 94.6 kg  208 lb 8.9 oz  Bed scale    3/21/2024 99 kg  218 lb 4.1 oz     3/22/2024 98.2 kg  216 lb 7.9 oz     3/25/2024 93 kg  205 lb 0.4 oz  Bed scale    3/26/2024 91.2 kg  201 lb 1 oz     3/27/2024 92 kg  202 lb 13.2 oz     3/28/2024 91.5 kg  " "201 lb 11.5 oz     3/29/2024 91 kg  200 lb 9.9 oz     3/31/2024 90.8 kg  200 lb 2.8 oz  Bed scale    2024 90.7 kg  199 lb 15.3 oz  Bed scale    2024 92.1 kg  203 lb 0.7 oz  Bed scale    2024 79.379 kg  175 lb     2024 79.379 kg  175 lb     2024 79.379 kg  175 lb     2024 79.4 kg  175 lb 0.7 oz  Estimated    2024 86.229 kg  190 lb 1.6 oz  Bed scale        Nutrition Focused Physical Exam     Date:     Unable to perform due to COVID Isolation      Subjective   Reported/Observed/Food/Nutrition Related History:     : per RN: pt passed SLP evaluation, but not eating much, still confused    :Pt sleeping in bed, on nasal cannula    Per RN: pt was combative last night, still very confused, has had 600ml UOP today, D5% @50ml started for hyperglycemia      Needs Assessment   Date: 24    Height used:Height: 182.9 cm (72\")  Weights used: 186.2lb/ 85kg    Estimated Calorie needs: ~ 1900kcal  Method:  20-25Kcals/K-2125kcal  Method:  MSJ x 1.2: 1996kcal      Estimated Protein needs: ~85g protein  Method:0.8-1.2g protein per k-102g protein    Current Nutrition Prescription     PO: Diet: Cardiac, Diabetic; Healthy Heart (2-3 Na+); Consistent Carbohydrate; Texture: Soft to Chew (NDD 3); Soft to Chew: Chopped Meat; Fluid Consistency: Thin (IDDSI 0)  Oral Nutrition Supplement:  Intake:  10% of 1 meal    EN: [employer] Renal  Goal Rate: 45ml  Water Flushes: 15ml  Modular: None  Route: PEG    At goal over: 22Hrs/day     Rx will supply:   Goal Volume 990  mL/day     Flush Volume 330 mL/day     Energy 1980 Kcal/day 104 % Est Need   Protein 90 g/day 106 % Est Need   Fiber 0 g/day     Water in   mL     Total Water 1043 mL     Meet DRI No        --------------------------------------------------------------------------  Product/Rate verified at bedside: N/A  Infusing Rate at time of visit: pt in Covid solation    Average Delivery from [facility]:   Volume 182 mL/day 18  % Goal " Vol.   Flush Volume 253 mL/day     Energy  Kcal/day  % Est Need   Protein  g/day  % Est Need   Fiber  g/day     Water in  EN  mL     Total Water  mL     Meet DRI No            Assessment & Plan   Nutrition Diagnosis   Date: 9-12-24 Updated: 9-13  Problem Inadequate energy intake    Etiology ARF/Covid 19   Signs/Symptoms Po intake 10%, 18% goal volume EN   Status: Active  Goal:   Nutrition to support treatment, Establish PO, and Maintain EN      Nutrition Intervention      Follow treatment progress, Care plan reviewed, Nutrition support order placed    Add Low K+ restriction to diet as pt still hyperkalemic    Add Boost Breeze 2x/day    Rec maintain EN as pt eating only minimal amount, still with AMS    Suggest add MVI as TF volume too low to meet RDI's    Monitoring/Evaluation:   Per protocol, I&O, Supplement intake, Pertinent labs, Weight, Skin status, GI status, Symptoms    Sonia Peraza, JAGDEEP  Time Spent: 30min

## 2024-09-14 LAB
ALBUMIN SERPL-MCNC: 3.3 G/DL (ref 3.5–5.2)
ALBUMIN/GLOB SERPL: 1.2 G/DL
ALP SERPL-CCNC: 105 U/L (ref 39–117)
ALT SERPL W P-5'-P-CCNC: 55 U/L (ref 1–41)
ANION GAP SERPL CALCULATED.3IONS-SCNC: 11 MMOL/L (ref 5–15)
AST SERPL-CCNC: 27 U/L (ref 1–40)
BILIRUB SERPL-MCNC: 0.3 MG/DL (ref 0–1.2)
BUN SERPL-MCNC: 32 MG/DL (ref 8–23)
BUN/CREAT SERPL: 27.4 (ref 7–25)
CALCIUM SPEC-SCNC: 8.4 MG/DL (ref 8.6–10.5)
CHLORIDE SERPL-SCNC: 104 MMOL/L (ref 98–107)
CO2 SERPL-SCNC: 22 MMOL/L (ref 22–29)
CREAT SERPL-MCNC: 1.17 MG/DL (ref 0.76–1.27)
CRP SERPL-MCNC: 4.89 MG/DL (ref 0–0.5)
DEPRECATED RDW RBC AUTO: 47.7 FL (ref 37–54)
EGFRCR SERPLBLD CKD-EPI 2021: 68.3 ML/MIN/1.73
ERYTHROCYTE [DISTWIDTH] IN BLOOD BY AUTOMATED COUNT: 14 % (ref 12.3–15.4)
GLOBULIN UR ELPH-MCNC: 2.7 GM/DL
GLUCOSE BLDC GLUCOMTR-MCNC: 169 MG/DL (ref 70–130)
GLUCOSE BLDC GLUCOMTR-MCNC: 177 MG/DL (ref 70–130)
GLUCOSE BLDC GLUCOMTR-MCNC: 256 MG/DL (ref 70–130)
GLUCOSE BLDC GLUCOMTR-MCNC: 262 MG/DL (ref 70–130)
GLUCOSE BLDC GLUCOMTR-MCNC: 295 MG/DL (ref 70–130)
GLUCOSE SERPL-MCNC: 150 MG/DL (ref 65–99)
HCT VFR BLD AUTO: 25.6 % (ref 37.5–51)
HGB BLD-MCNC: 8.4 G/DL (ref 13–17.7)
MAGNESIUM SERPL-MCNC: 1.6 MG/DL (ref 1.6–2.4)
MCH RBC QN AUTO: 30.5 PG (ref 26.6–33)
MCHC RBC AUTO-ENTMCNC: 32.8 G/DL (ref 31.5–35.7)
MCV RBC AUTO: 93.1 FL (ref 79–97)
PHOSPHATE SERPL-MCNC: 3.4 MG/DL (ref 2.5–4.5)
PLATELET # BLD AUTO: 170 10*3/MM3 (ref 140–450)
PMV BLD AUTO: 11.2 FL (ref 6–12)
POTASSIUM SERPL-SCNC: 4.7 MMOL/L (ref 3.5–5.2)
PROT SERPL-MCNC: 6 G/DL (ref 6–8.5)
QT INTERVAL: 336 MS
QTC INTERVAL: 367 MS
RBC # BLD AUTO: 2.75 10*6/MM3 (ref 4.14–5.8)
SODIUM SERPL-SCNC: 137 MMOL/L (ref 136–145)
WBC NRBC COR # BLD AUTO: 7.83 10*3/MM3 (ref 3.4–10.8)

## 2024-09-14 PROCEDURE — 83735 ASSAY OF MAGNESIUM: CPT | Performed by: INTERNAL MEDICINE

## 2024-09-14 PROCEDURE — 80053 COMPREHEN METABOLIC PANEL: CPT | Performed by: STUDENT IN AN ORGANIZED HEALTH CARE EDUCATION/TRAINING PROGRAM

## 2024-09-14 PROCEDURE — 63710000001 DEXAMETHASONE PER 0.25 MG: Performed by: STUDENT IN AN ORGANIZED HEALTH CARE EDUCATION/TRAINING PROGRAM

## 2024-09-14 PROCEDURE — 94799 UNLISTED PULMONARY SVC/PX: CPT

## 2024-09-14 PROCEDURE — 82948 REAGENT STRIP/BLOOD GLUCOSE: CPT

## 2024-09-14 PROCEDURE — 94761 N-INVAS EAR/PLS OXIMETRY MLT: CPT

## 2024-09-14 PROCEDURE — 94664 DEMO&/EVAL PT USE INHALER: CPT

## 2024-09-14 PROCEDURE — 63710000001 INSULIN LISPRO (HUMAN) PER 5 UNITS: Performed by: HOSPITALIST

## 2024-09-14 PROCEDURE — 86140 C-REACTIVE PROTEIN: CPT | Performed by: INTERNAL MEDICINE

## 2024-09-14 PROCEDURE — 25010000002 CEFEPIME PER 500 MG

## 2024-09-14 PROCEDURE — 84100 ASSAY OF PHOSPHORUS: CPT | Performed by: INTERNAL MEDICINE

## 2024-09-14 PROCEDURE — 99232 SBSQ HOSP IP/OBS MODERATE 35: CPT | Performed by: HOSPITALIST

## 2024-09-14 PROCEDURE — 85027 COMPLETE CBC AUTOMATED: CPT | Performed by: INTERNAL MEDICINE

## 2024-09-14 PROCEDURE — 25010000002 REMDESIVIR 100 MG/20ML SOLUTION 1 EACH VIAL: Performed by: STUDENT IN AN ORGANIZED HEALTH CARE EDUCATION/TRAINING PROGRAM

## 2024-09-14 PROCEDURE — 25810000003 SODIUM CHLORIDE 0.9 % SOLUTION 250 ML FLEX CONT: Performed by: STUDENT IN AN ORGANIZED HEALTH CARE EDUCATION/TRAINING PROGRAM

## 2024-09-14 PROCEDURE — 25010000002 CEFEPIME PER 500 MG: Performed by: INTERNAL MEDICINE

## 2024-09-14 RX ORDER — NICOTINE POLACRILEX 4 MG
15 LOZENGE BUCCAL
Status: DISCONTINUED | OUTPATIENT
Start: 2024-09-14 | End: 2024-09-16 | Stop reason: ALTCHOICE

## 2024-09-14 RX ORDER — IBUPROFEN 600 MG/1
1 TABLET ORAL
Status: DISCONTINUED | OUTPATIENT
Start: 2024-09-14 | End: 2024-09-18 | Stop reason: HOSPADM

## 2024-09-14 RX ORDER — DEXTROSE MONOHYDRATE 25 G/50ML
25 INJECTION, SOLUTION INTRAVENOUS
Status: DISCONTINUED | OUTPATIENT
Start: 2024-09-14 | End: 2024-09-18 | Stop reason: HOSPADM

## 2024-09-14 RX ORDER — INSULIN LISPRO 100 [IU]/ML
2-7 INJECTION, SOLUTION INTRAVENOUS; SUBCUTANEOUS
Status: DISCONTINUED | OUTPATIENT
Start: 2024-09-14 | End: 2024-09-18 | Stop reason: HOSPADM

## 2024-09-14 RX ORDER — LINEZOLID 600 MG/1
600 TABLET, FILM COATED ORAL 2 TIMES DAILY
Status: DISCONTINUED | OUTPATIENT
Start: 2024-09-14 | End: 2024-09-18 | Stop reason: HOSPADM

## 2024-09-14 RX ADMIN — MINERAL OIL 5 ML: 1000 LIQUID ORAL at 20:26

## 2024-09-14 RX ADMIN — VALPROIC ACID 150 MG: 250 SOLUTION ORAL at 22:07

## 2024-09-14 RX ADMIN — CARVEDILOL 12.5 MG: 12.5 TABLET, FILM COATED ORAL at 20:24

## 2024-09-14 RX ADMIN — Medication 10 ML: at 08:21

## 2024-09-14 RX ADMIN — IPRATROPIUM BROMIDE AND ALBUTEROL SULFATE 3 ML: 2.5; .5 SOLUTION RESPIRATORY (INHALATION) at 01:12

## 2024-09-14 RX ADMIN — DEXAMETHASONE 6 MG: 4 TABLET ORAL at 08:21

## 2024-09-14 RX ADMIN — Medication 10 ML: at 20:26

## 2024-09-14 RX ADMIN — CLONIDINE HYDROCHLORIDE 0.2 MG: 0.2 TABLET ORAL at 22:07

## 2024-09-14 RX ADMIN — MINERAL OIL 5 ML: 1000 LIQUID ORAL at 12:18

## 2024-09-14 RX ADMIN — BRIMONIDINE TARTRATE 1 DROP: 2 SOLUTION/ DROPS OPHTHALMIC at 20:24

## 2024-09-14 RX ADMIN — LINEZOLID 600 MG: 600 TABLET, FILM COATED ORAL at 08:21

## 2024-09-14 RX ADMIN — CEFEPIME 2000 MG: 2 INJECTION, POWDER, FOR SOLUTION INTRAVENOUS at 14:04

## 2024-09-14 RX ADMIN — REMDESIVIR 100 MG: 100 INJECTION, POWDER, LYOPHILIZED, FOR SOLUTION INTRAVENOUS at 09:46

## 2024-09-14 RX ADMIN — IPRATROPIUM BROMIDE AND ALBUTEROL SULFATE 3 ML: 2.5; .5 SOLUTION RESPIRATORY (INHALATION) at 08:29

## 2024-09-14 RX ADMIN — MINERAL OIL 5 ML: 1000 LIQUID ORAL at 17:29

## 2024-09-14 RX ADMIN — VALPROIC ACID 150 MG: 250 SOLUTION ORAL at 05:17

## 2024-09-14 RX ADMIN — CARVEDILOL 12.5 MG: 12.5 TABLET, FILM COATED ORAL at 08:20

## 2024-09-14 RX ADMIN — AMLODIPINE BESYLATE 10 MG: 10 TABLET ORAL at 08:20

## 2024-09-14 RX ADMIN — IPRATROPIUM BROMIDE AND ALBUTEROL SULFATE 3 ML: 2.5; .5 SOLUTION RESPIRATORY (INHALATION) at 18:58

## 2024-09-14 RX ADMIN — LINEZOLID 600 MG: 600 TABLET, FILM COATED ORAL at 20:29

## 2024-09-14 RX ADMIN — BRIMONIDINE TARTRATE 1 DROP: 2 SOLUTION/ DROPS OPHTHALMIC at 08:22

## 2024-09-14 RX ADMIN — VALPROIC ACID 150 MG: 250 SOLUTION ORAL at 14:06

## 2024-09-14 RX ADMIN — HYDRALAZINE HYDROCHLORIDE 100 MG: 50 TABLET ORAL at 14:05

## 2024-09-14 RX ADMIN — DOXYCYCLINE 100 MG: 100 CAPSULE ORAL at 23:15

## 2024-09-14 RX ADMIN — TIMOLOL MALEATE 1 DROP: 5 SOLUTION/ DROPS OPHTHALMIC at 08:22

## 2024-09-14 RX ADMIN — CEFEPIME 2000 MG: 2 INJECTION, POWDER, FOR SOLUTION INTRAVENOUS at 22:06

## 2024-09-14 RX ADMIN — DOXYCYCLINE 100 MG: 100 CAPSULE ORAL at 12:18

## 2024-09-14 RX ADMIN — CLONIDINE HYDROCHLORIDE 0.2 MG: 0.2 TABLET ORAL at 05:18

## 2024-09-14 RX ADMIN — LANSOPRAZOLE 30 MG: 15 TABLET, ORALLY DISINTEGRATING ORAL at 05:17

## 2024-09-14 RX ADMIN — TIMOLOL MALEATE 1 DROP: 5 SOLUTION/ DROPS OPHTHALMIC at 20:24

## 2024-09-14 RX ADMIN — CEFEPIME 2000 MG: 2 INJECTION, POWDER, FOR SOLUTION INTRAVENOUS at 05:29

## 2024-09-14 RX ADMIN — BRIMONIDINE TARTRATE 1 DROP: 2 SOLUTION/ DROPS OPHTHALMIC at 16:06

## 2024-09-14 RX ADMIN — HYDRALAZINE HYDROCHLORIDE 100 MG: 50 TABLET ORAL at 05:18

## 2024-09-14 RX ADMIN — INSULIN LISPRO 4 UNITS: 100 INJECTION, SOLUTION INTRAVENOUS; SUBCUTANEOUS at 17:29

## 2024-09-14 RX ADMIN — INSULIN LISPRO 4 UNITS: 100 INJECTION, SOLUTION INTRAVENOUS; SUBCUTANEOUS at 20:24

## 2024-09-14 RX ADMIN — MINERAL OIL 5 ML: 1000 LIQUID ORAL at 08:22

## 2024-09-14 RX ADMIN — HYDRALAZINE HYDROCHLORIDE 100 MG: 50 TABLET ORAL at 22:10

## 2024-09-14 NOTE — PROGRESS NOTES
[facility] Medicine Services  PROGRESS NOTE    Patient Name: Omkar Nava  : 1955  MRN: [id number]    Date of Admission: 2024  Primary Care Physician: Alberto Dye MD    Subjective   Subjective     CC:  F/U AMS    HPI:  Seen this morning. He is sitting up in bed. Says he ate well for breakfast. Denies SOA. No complaints. Says he doesn't wear O2 all the time at [employer]      Objective   Objective     Vital Signs:   Temp:  [97.8 °F (36.6 °C)-98.8 °F (37.1 °C)] 97.8 °F (36.6 °C)  Heart Rate:  [56-80] 58  Resp:  [16-18] 18  BP: (137-168)/(53-68) 137/53  Flow (L/min):  [2-4] 2     Physical Exam:  Gen-no acute distress  HENT-NCAT, mucous membranes moist  CV-RRR, S1 S2 normal, no m/r/g  Resp-overall CTAB, no wheezes or rales  Abd-soft, NT, ND, +BS  Ext-no edema  Neuro-awake, alert, answers most questions appropriately  Skin-no rashes  Psych-appropriate mood      Results Reviewed:  LAB RESULTS:      Lab 24  0543 24  0746 24  0343 24  1816   WBC 7.83 7.80 6.57 6.37   HEMOGLOBIN 8.4* 9.2* 8.6* 6.9*   HEMATOCRIT 25.6* 28.9* 26.9* 22.4*   PLATELETS 170 184 165 173   NEUTROS ABS  --  5.84 4.32 4.42   IMMATURE GRANS (ABS)  --  0.03 0.02 0.03   LYMPHS ABS  --  1.28 1.17 0.92   MONOS ABS  --  0.61 1.04* 0.94*   EOS ABS  --  0.03 0.01 0.04   MCV 93.1 96.0 96.1 100.9*   SED RATE  --   --   --  27*   CRP 4.89* 3.67*  --  1.64*   PROCALCITONIN  --   --   --  0.11   LACTATE  --   --   --  0.5         Lab 24  0543 24  0746 24  0725 24  0342 24  1816   SODIUM 137 137  --  134* 132*   POTASSIUM 4.7 5.4* 5.3* 6.3* 6.4*   CHLORIDE 104 105  --  104 101   CO2 22.0 22.0  --  19.0* 22.0   ANION GAP 11.0 10.0  --  11.0 9.0   BUN 32* 30*  --  34* 40*   CREATININE 1.17 1.22  --  1.16 1.62*   EGFR 68.3 65.0  --  69.0 46.2*   GLUCOSE 150* 97  --  75 87   CALCIUM 8.4* 9.1  --  8.8 8.7   MAGNESIUM 1.6  --   --  1.9 2.2   PHOSPHORUS 3.4  --   --   --    --          Lab 09/14/24  0543 09/13/24  0746 09/12/24  0342 09/11/24  1816   TOTAL PROTEIN 6.0 6.5 6.8 6.8   ALBUMIN 3.3* 3.6 3.3* 3.5   GLOBULIN 2.7 2.9 3.5 3.3   ALT (SGPT) 55* 70* 78* 85*   AST (SGOT) 27 36 37 37   BILIRUBIN 0.3 0.2 0.2 <0.2   ALK PHOS 105 96 91 93                 Lab 09/11/24 2005 09/11/24  1816   IRON  --  26*   IRON SATURATION (TSAT)  --  7*   TIBC  --  355   TRANSFERRIN  --  238   FERRITIN  --  308.30   FOLATE  --  12.10   VITAMIN B 12  --  1,158*   ABO TYPING O  --    RH TYPING Positive  --    ANTIBODY SCREEN Negative  --          Lab 09/11/24 2151   PH, ARTERIAL 7.369   PCO2, ARTERIAL 42.2   PO2 .0*   FIO2 21   HCO3 ART 24.3   BASE EXCESS ART -1.0*   CARBOXYHEMOGLOBIN 1.3     Brief Urine Lab Results  (Last result in the past 365 days)        Color   Clarity   Blood   Leuk Est   Nitrite   Protein   CREAT   Urine HCG        09/11/24 1905 Yellow   Clear   Negative   Trace   Negative   Trace                   Microbiology Results Abnormal       Procedure Component Value - Date/Time    Blood Culture - Blood, Arm, Right [[id number]]  (Normal) Collected: 09/11/24 2144    Lab Status: Preliminary result Specimen: Blood from Arm, Right Updated: 09/13/24 2246     Blood Culture No growth at 2 days    Narrative:      Less than seven (7) mL's of blood was collected.  Insufficient quantity may yield false negative results.    Blood Culture - Blood, Arm, Left [[id number]]  (Normal) Collected: 09/11/24 2144    Lab Status: Preliminary result Specimen: Blood from Arm, Left Updated: 09/13/24 2246     Blood Culture No growth at 2 days    Narrative:      Less than seven (7) mL's of blood was collected.  Insufficient quantity may yield false negative results.    S. Pneumo Ag Urine or CSF - Urine, Urine, Clean Catch [[id number]]  (Normal) Collected: 09/12/24 1158    Lab Status: Final result Specimen: Urine, Clean Catch Updated: 09/12/24 1902     Strep Pneumo Ag Negative    Legionella Antigen, Urine -  Urine, Urine, Clean Catch [[id number]]  (Normal) Collected: 09/12/24 1156    Lab Status: Final result Specimen: Urine, Clean Catch Updated: 09/12/24 1902     LEGIONELLA ANTIGEN, URINE Negative            FL Video Swallow With Speech Single Contrast    Result Date: 9/13/2024  FL VIDEO SWALLOW W SPEECH SINGLE-CONTRAST Date of Exam: 9/13/2024 3:30 PM EDT Indication: dysphagia.   Comparison: None available. Technique:   The speech pathologist administered food and/or liquid mixed with barium to the patient with cine/video imaging.  Imaging assistance was provided to the speech pathologist and an image was saved. Fluoroscopic Time: 1 minute and 24 seconds Number of Images: 6 associated fluoroscopic loops were saved Findings: No aspiration was seen during fluoroscopic guided modified barium swallowing series. Please see speech therapy report for full details and recommendations.     Impression: Impression: Fluoroscopy provided for a modified barium swallow. No aspiration was seen during swallowing evaluation. Please see speech therapy report for full details and recommendations. Report dictated by: Maggi Silverman PA-c  I have personally reviewed this case and agree with the findings above: Electronically Signed: Anjum Prado MD  9/13/2024 3:57 PM EDT  Workstation ID: [id number]     Results for orders placed during the hospital encounter of 01/15/24    Adult Transthoracic Echo Complete With Contrast if Necessary Per Protocol    Interpretation Summary    Left ventricular ejection fraction appears to be 56 - 60%.    Left ventricular wall thickness is consistent with hypertrophy.    Estimated right ventricular systolic pressure from tricuspid regurgitation is mildly elevated (35-45 mmHg).    There is a small (1-2cm) pericardial effusion.    No evidence for pericardial tamponade      Current medications:  Scheduled Meds:amLODIPine, 10 mg, Per G Tube, Q24H  [Held by provider] ARIPiprazole, 5 mg, Per G Tube, Daily  brimonidine, 1  drop, Both Eyes, TID  carvedilol, 12.5 mg, Per G Tube, Q12H  cefepime, 2,000 mg, Intravenous, Q8H  cloNIDine, 0.2 mg, Per G Tube, Q8H  dexAMETHasone, 6 mg, Per G Tube, Daily   Or  dexAMETHasone, 6 mg, Intravenous, Daily  doxycycline, 100 mg, Per G Tube, Q12H  hydrALAZINE, 100 mg, Per G Tube, Q8H  ipratropium-albuterol, 3 mL, Nebulization, Q6H - RT  lansoprazole, 30 mg, Per G Tube, Q AM  linezolid, 600 mg, Per PEG Tube, BID  palliative care oral rinse, 5 mL, Mouth/Throat, 4x Daily  [Held by provider] QUEtiapine, 25 mg, Per G Tube, Nightly  remdesivir, 100 mg, Intravenous, Q24H  sodium chloride, 10 mL, Intravenous, Q12H  [Held by provider] terazosin, 5 mg, Per G Tube, Q12H  timolol, 1 drop, Both Eyes, BID  [Held by provider] torsemide, 5 mg, Per G Tube, Daily  Valproic Acid, 150 mg, Per G Tube, Q8H      Continuous Infusions:Pharmacy Consult - Remdesivir,       PRN Meds:.  acetaminophen    senna-docusate sodium **AND** polyethylene glycol **AND** [DISCONTINUED] bisacodyl **AND** bisacodyl    Calcium Replacement - Follow Nurse / BPA Driven Protocol    [Held by provider] hydrOXYzine    Magnesium Standard Dose Replacement - Follow Nurse / BPA Driven Protocol    Pharmacy Consult - Remdesivir    Phosphorus Replacement - Follow Nurse / BPA Driven Protocol    Potassium Replacement - Follow Nurse / BPA Driven Protocol    sodium chloride    sodium chloride    Assessment & Plan   Assessment & Plan     Active Hospital Problems    Diagnosis  POA    **Hypoxia [R09.02]  Yes    COVID-19 virus detected [U07.1]  Unknown    Bilateral pneumonia [J18.9]  Yes    Encephalopathy [G93.40]  Yes    Chronic kidney disease [N18.9]  Yes    Aspiration pneumonia [J69.0]  Yes    Dementia [F03.90]  Yes    Anemia of chronic disease [D63.8]  Yes    Neurocognitive disorder [R41.9]  Yes    Essential hypertension [I10]  Yes    Type 2 diabetes mellitus [E11.9]  Yes    Iron deficiency anemia [D50.9]  Yes      Resolved Hospital Problems   No resolved problems  to display.        Brief Hospital Course to date:  Omkar Nava is a 69 y.o. male with PMH significant for diabetes mellitus, complicated by blindness, osteomyelitis, CKD, early onset dementia with psychosis, decreased mobility, and heart failure with preserved EF. He presents from [facility] for decreased mental status and fever.     Recent admission 3/10-4/4/24 due to hypoxic respiratory failure requiring intubation in setting of recurrent aspiration and multifocal pneumonia; respiratory culture grew ESBL Klebsiella at that time. Had PEG tube placed at that time.     This patient's problems and plans were partially entered by my partner and updated as appropriate by me 09/14/24. Copied text in this note has been reviewed and is accurate as of today's date.     Acute hypoxia  Sepsis secondary to multifocal pneumonia  History of ESBL Klebsiella  COVID-19 positive  --Presented tachypneic, febrile, and oxygen requirement of 3 L - patient reports he is on 2 liters at [facility] but does not wear all the time  --COVID-19 test positive day of admission. Multifocal pneumonia on CT scan. Chronic aspiration with PEG tube in place. Suspect findings related to chronic aspiration and acute COVID, however, note history of severe respiratory failure and ESBL Klebsiella in previous sputum culture.  --ID/Dr. Gordon following, states that likely aspiration PNA + possible secondary bacterial infection due to COVID. States that slightly increased risk of ESBL but more distant. Recommends Cefepime and Doxycycline for now.     --Continue Remdesivir + Decadron   --Blood cultures NGTD, negative urinary antigens, positive MRSA PCA  --Aggressive bronchopulmonary hygiene, IS, OPEP, suctioning  --Weaned from 4 liters to 2 liters  --Monitor for further fevers, last was 101 on 9/13/24 AM     Acute on chronic anemia  --Baseline Hb near 8, Hb was 6.9 on presentation  --No signs of bleeding at this time. MCV is elevated. Fecal occult in  ED is negative. Suspect related to acute on chronic illness.  --B12 and folate okay. Iron studies c/w anemia of chronic disease.  --s/p 1 unit PRBC on 9/11/24, monitor closely     Hyperkalemia  --K 6.4 on admission, received treatment in ED  --Improved, monitor     JAYY on CKD  --Cr 1.62 on admission but has fluctuated significantly over the last year. Last value 0.92 ~5 months ago. Will monitor output, hold nephrotoxic meds.  --Better with IVF's     Chronic aspiration  --PEG tube in place (placed during recent admission 4/1/24 by Dr. Brunner), was discharged with strict NPO status but since then has been able to advance back to PO diet at [facility] per family  --SLP has seen and performed MBS 9/13/24: soft to chew, ground, thin liquids  --Tube feeds per Nutrition, need to see how much PO intake he has     HFpEF  HTN  --Appears compensated at this time. Holding home Coreg in favor of Remdesivir given HR 60s, if stable can add back on.   --Holding Torsemide, Clonidine, Terazosin, and Hydralazine in setting of sepsis - resume as tolerated     T2DM with hypoglycemia  --HbA1c 7% October 2022 - repeat in AM  --Resolved, monitor  --SSI, adjust as needed     Dementia with agitation  Seizure disorder?  --Given decreased responsiveness leading to initial presentation we have been holding most of sedating meds for now.  Placed on Depakote for agitation per Neurology in past?  --Add back other sedatives as needed       Expected Discharge Location and Transportation: back to [facility]  Expected Discharge   Expected Discharge Date: 9/16/2024; Expected Discharge Time:      VTE Prophylaxis:  Mechanical VTE prophylaxis orders are present.         AM-PAC 6 Clicks Score (PT): 10 (09/14/24 0206)    CODE STATUS:   Code Status and Medical Interventions: CPR (Attempt to Resuscitate); Full Support; Cannot reach family, deferring to prior   Ordered at: 09/12/24 0003     Code Status (Patient has no pulse and is not breathing):     CPR (Attempt to Resuscitate)     Medical Interventions (Patient has pulse or is breathing):    Full Support     Comments:    Cannot reach family, deferring to prior       Ludmila Parish MD  09/14/24

## 2024-09-14 NOTE — PLAN OF CARE
Problem: Adult Inpatient Plan of Care  Goal: Plan of Care Review  Outcome: Ongoing, Progressing  Goal: Patient-Specific Goal (Individualized)  Outcome: Ongoing, Progressing  Goal: Absence of Hospital-Acquired Illness or Injury  Outcome: Ongoing, Progressing  Intervention: Identify and Manage Fall Risk  Recent Flowsheet Documentation  Taken 9/14/2024 0526 by Tyler Nevarez RN  Safety Promotion/Fall Prevention:   activity supervised   assistive device/personal items within reach   clutter free environment maintained   fall prevention program maintained   gait belt   mobility aid in reach   nonskid shoes/slippers when out of bed   room organization consistent   safety round/check completed  Taken 9/14/2024 0412 by Tyler Nevarez RN  Safety Promotion/Fall Prevention:   activity supervised   assistive device/personal items within reach   clutter free environment maintained   fall prevention program maintained   gait belt   mobility aid in reach   nonskid shoes/slippers when out of bed   room organization consistent   safety round/check completed  Taken 9/14/2024 0206 by Tyler Nevarez RN  Safety Promotion/Fall Prevention:   activity supervised   assistive device/personal items within reach   clutter free environment maintained   fall prevention program maintained   gait belt   mobility aid in reach   nonskid shoes/slippers when out of bed   room organization consistent   safety round/check completed  Taken 9/14/2024 0018 by Tyler Nevarez RN  Safety Promotion/Fall Prevention:   activity supervised   assistive device/personal items within reach   clutter free environment maintained   fall prevention program maintained   gait belt   mobility aid in reach   nonskid shoes/slippers when out of bed   room organization consistent   safety round/check completed  Taken 9/13/2024 2220 by Tyler Nevarez RN  Safety Promotion/Fall Prevention:   activity supervised   assistive device/personal items within reach   clutter free  environment maintained   fall prevention program maintained   gait belt   lighting adjusted   mobility aid in reach   nonskid shoes/slippers when out of bed   room organization consistent   safety round/check completed  Taken 9/13/2024 2030 by Tyler Nevarez RN  Safety Promotion/Fall Prevention:   activity supervised   assistive device/personal items within reach   clutter free environment maintained   fall prevention program maintained   gait belt   mobility aid in reach   nonskid shoes/slippers when out of bed   room organization consistent   safety round/check completed  Intervention: Prevent Skin Injury  Recent Flowsheet Documentation  Taken 9/14/2024 0526 by Tyler Nevarez RN  Body Position:   side-lying   left  Taken 9/14/2024 0412 by Tyler Nevarez RN  Body Position:   turned   left  Skin Protection:   adhesive use limited   tubing/devices free from skin contact  Taken 9/14/2024 0206 by Tyler Nevarez RN  Body Position: supine  Taken 9/14/2024 0018 by Tyler Nevarez RN  Body Position:   side-lying   right  Skin Protection:   adhesive use limited   incontinence pads utilized   tubing/devices free from skin contact  Taken 9/13/2024 2220 by Tyler Nevarez RN  Body Position: supine  Skin Protection:   adhesive use limited   incontinence pads utilized   tubing/devices free from skin contact  Taken 9/13/2024 2030 by Tyler Nevarez RN  Body Position: supine  Intervention: Prevent and Manage VTE (Venous Thromboembolism) Risk  Recent Flowsheet Documentation  Taken 9/14/2024 0526 by Tyler Nevarez RN  Activity Management: activity encouraged  VTE Prevention/Management:   bilateral   sequential compression devices on  Taken 9/14/2024 0412 by Tyler Nevarez RN  Activity Management: activity encouraged  VTE Prevention/Management:   bilateral   sequential compression devices on  Taken 9/14/2024 0206 by Tyler Nevarez RN  Activity Management: activity encouraged  VTE Prevention/Management:   bilateral   sequential  compression devices on  Taken 9/14/2024 0018 by Tyler Nevarez RN  Activity Management: activity encouraged  VTE Prevention/Management:   bilateral   sequential compression devices on  Taken 9/13/2024 2220 by Tyler Nevarez RN  Activity Management: activity encouraged  VTE Prevention/Management:   bilateral   sequential compression devices on  Taken 9/13/2024 2030 by Tyler Nevarez RN  Activity Management: activity encouraged  VTE Prevention/Management:   bilateral   sequential compression devices on  Range of Motion: active ROM (range of motion) encouraged  Intervention: Prevent Infection  Recent Flowsheet Documentation  Taken 9/14/2024 0526 by Tyler Nevarez RN  Infection Prevention:   environmental surveillance performed   rest/sleep promoted   single patient room provided  Taken 9/14/2024 0412 by Tyler Nevarez RN  Infection Prevention: environmental surveillance performed  Taken 9/14/2024 0206 by Tyler Nevarez RN  Infection Prevention:   environmental surveillance performed   personal protective equipment utilized   rest/sleep promoted   single patient room provided  Taken 9/14/2024 0018 by Tyler Nevarez RN  Infection Prevention:   environmental surveillance performed   hand hygiene promoted   personal protective equipment utilized   rest/sleep promoted   single patient room provided  Taken 9/13/2024 2220 by Tyler Nevarez RN  Infection Prevention:   environmental surveillance performed   hand hygiene promoted   personal protective equipment utilized   rest/sleep promoted   single patient room provided  Taken 9/13/2024 2030 by Tyler Nevarez RN  Infection Prevention:   environmental surveillance performed   personal protective equipment utilized   rest/sleep promoted   single patient room provided  Goal: Optimal Comfort and Wellbeing  Outcome: Ongoing, Progressing  Intervention: Monitor Pain and Promote Comfort  Recent Flowsheet Documentation  Taken 9/14/2024 0526 by Tyler Nevarez RN  Pain  Management Interventions:   position adjusted   pillow support provided  Taken 9/14/2024 0412 by Tyler Nevarez RN  Pain Management Interventions:   position adjusted   quiet environment facilitated  Taken 9/14/2024 0206 by Tyler Nevarez RN  Pain Management Interventions:   position adjusted   quiet environment facilitated  Taken 9/14/2024 0018 by Tyler Nevarez RN  Pain Management Interventions: position adjusted  Taken 9/13/2024 2220 by Tyler Nevarez RN  Pain Management Interventions:   position adjusted   pillow support provided   quiet environment facilitated  Taken 9/13/2024 2030 by Tyler Nevarez RN  Pain Management Interventions:   position adjusted   pillow support provided   quiet environment facilitated  Intervention: Provide Person-Centered Care  Recent Flowsheet Documentation  Taken 9/14/2024 0526 by Tyler Nevarez RN  Trust Relationship/Rapport: care explained  Taken 9/14/2024 0206 by Tyler Nevarez RN  Trust Relationship/Rapport: care explained  Taken 9/14/2024 0018 by Tyler Nevarez RN  Trust Relationship/Rapport: care explained  Taken 9/13/2024 2220 by Tyler Nevarez RN  Trust Relationship/Rapport: care explained  Taken 9/13/2024 2030 by Tyler Nevarez RN  Trust Relationship/Rapport:   care explained   reassurance provided  Goal: Readiness for Transition of Care  Outcome: Ongoing, Progressing     Problem: Skin Injury Risk Increased  Goal: Skin Health and Integrity  Outcome: Ongoing, Progressing  Intervention: Optimize Skin Protection  Recent Flowsheet Documentation  Taken 9/14/2024 0526 by Tyler Nevarez RN  Head of Bed (HOB) Positioning: HOB at 30 degrees  Taken 9/14/2024 0412 by Tyler Nevarez RN  Pressure Reduction Techniques:   frequent weight shift encouraged   weight shift assistance provided  Head of Bed (HOB) Positioning: HOB at 30-45 degrees  Pressure Reduction Devices:   positioning supports utilized   pressure-redistributing mattress utilized  Skin Protection:   adhesive use  limited   tubing/devices free from skin contact  Taken 9/14/2024 0206 by Tyler Nevarez RN  Head of Bed (HOB) Positioning: HOB at 30-45 degrees  Taken 9/14/2024 0018 by Tyler Nevarez RN  Pressure Reduction Techniques:   frequent weight shift encouraged   weight shift assistance provided  Head of Bed (HOB) Positioning: HOB at 30-45 degrees  Pressure Reduction Devices:   positioning supports utilized   pressure-redistributing mattress utilized  Skin Protection:   adhesive use limited   incontinence pads utilized   tubing/devices free from skin contact  Taken 9/13/2024 2220 by Tyler Nevarez RN  Pressure Reduction Techniques:   frequent weight shift encouraged   weight shift assistance provided  Head of Bed (HOB) Positioning: HOB at 30-45 degrees  Pressure Reduction Devices:   pressure-redistributing mattress utilized   positioning supports utilized  Skin Protection:   adhesive use limited   incontinence pads utilized   tubing/devices free from skin contact  Taken 9/13/2024 2030 by Tyler Nevarez RN  Head of Bed (HOB) Positioning: HOB at 30-45 degrees     Problem: Fall Injury Risk  Goal: Absence of Fall and Fall-Related Injury  Outcome: Ongoing, Progressing  Intervention: Identify and Manage Contributors  Recent Flowsheet Documentation  Taken 9/14/2024 0526 by Tyler Nevarez RN  Self-Care Promotion: independence encouraged  Taken 9/14/2024 0412 by Tyler Nevarez RN  Self-Care Promotion: independence encouraged  Taken 9/14/2024 0206 by Tyler Nevarez RN  Self-Care Promotion: independence encouraged  Taken 9/13/2024 2030 by Tyler Nevarez RN  Medication Review/Management: medications reviewed  Intervention: Promote Injury-Free Environment  Recent Flowsheet Documentation  Taken 9/14/2024 0526 by Tyler Nevarez RN  Safety Promotion/Fall Prevention:   activity supervised   assistive device/personal items within reach   clutter free environment maintained   fall prevention program maintained   gait belt   mobility  aid in reach   nonskid shoes/slippers when out of bed   room organization consistent   safety round/check completed  Taken 9/14/2024 0412 by Tyler Nevarez RN  Safety Promotion/Fall Prevention:   activity supervised   assistive device/personal items within reach   clutter free environment maintained   fall prevention program maintained   gait belt   mobility aid in reach   nonskid shoes/slippers when out of bed   room organization consistent   safety round/check completed  Taken 9/14/2024 0206 by Tyler Nevarez RN  Safety Promotion/Fall Prevention:   activity supervised   assistive device/personal items within reach   clutter free environment maintained   fall prevention program maintained   gait belt   mobility aid in reach   nonskid shoes/slippers when out of bed   room organization consistent   safety round/check completed  Taken 9/14/2024 0018 by Tyler Nevarez RN  Safety Promotion/Fall Prevention:   activity supervised   assistive device/personal items within reach   clutter free environment maintained   fall prevention program maintained   gait belt   mobility aid in reach   nonskid shoes/slippers when out of bed   room organization consistent   safety round/check completed  Taken 9/13/2024 2220 by Tyler Nevarez RN  Safety Promotion/Fall Prevention:   activity supervised   assistive device/personal items within reach   clutter free environment maintained   fall prevention program maintained   gait belt   lighting adjusted   mobility aid in reach   nonskid shoes/slippers when out of bed   room organization consistent   safety round/check completed  Taken 9/13/2024 2030 by Tyler Nevarez RN  Safety Promotion/Fall Prevention:   activity supervised   assistive device/personal items within reach   clutter free environment maintained   fall prevention program maintained   gait belt   mobility aid in reach   nonskid shoes/slippers when out of bed   room organization consistent   safety round/check completed      Problem: COPD (Chronic Obstructive Pulmonary Disease) Comorbidity  Goal: Maintenance of COPD Symptom Control  Outcome: Ongoing, Progressing  Intervention: Maintain COPD-Symptom Control  Recent Flowsheet Documentation  Taken 9/13/2024 2030 by Tyler Nevarez RN  Medication Review/Management: medications reviewed     Problem: Diabetes Comorbidity  Goal: Blood Glucose Level Within Targeted Range  Outcome: Ongoing, Progressing  Intervention: Monitor and Manage Glycemia  Recent Flowsheet Documentation  Taken 9/13/2024 2030 by Tyler Nevarez RN  Glycemic Management: blood glucose monitored     Problem: Hypertension Comorbidity  Goal: Blood Pressure in Desired Range  Outcome: Ongoing, Progressing  Intervention: Maintain Blood Pressure Management  Recent Flowsheet Documentation  Taken 9/13/2024 2030 by Tyler Nevarez RN  Medication Review/Management: medications reviewed     Problem: Fluid Imbalance (Pneumonia)  Goal: Fluid Balance  Outcome: Ongoing, Progressing     Problem: Infection (Pneumonia)  Goal: Resolution of Infection Signs and Symptoms  Outcome: Ongoing, Progressing     Problem: Respiratory Compromise (Pneumonia)  Goal: Effective Oxygenation and Ventilation  Outcome: Ongoing, Progressing  Intervention: Optimize Oxygenation and Ventilation  Recent Flowsheet Documentation  Taken 9/14/2024 0526 by Tyler Nevarez RN  Head of Bed (HOB) Positioning: HOB at 30 degrees  Taken 9/14/2024 0412 by Tyler Nevarez RN  Head of Bed (HOB) Positioning: HOB at 30-45 degrees  Taken 9/14/2024 0206 by Tyler Nevarez RN  Head of Bed (HOB) Positioning: HOB at 30-45 degrees  Taken 9/14/2024 0018 by Tyler Nevarez RN  Head of Bed (HOB) Positioning: HOB at 30-45 degrees  Taken 9/13/2024 2220 by Tyler Nevarez RN  Head of Bed (HOB) Positioning: HOB at 30-45 degrees  Taken 9/13/2024 2030 by Tyler Nevarez RN  Head of Bed (HOB) Positioning: HOB at 30-45 degrees   Goal Outcome Evaluation:      Pt oriented to self only. VSS.  Incontinent of B&B. Pt on 2LNC which is his baseline. Pt cooperating with staff most of the time. Pt uncooperative at times. Q2hr and PRN turn with staff assist.

## 2024-09-15 LAB
ALBUMIN SERPL-MCNC: 3.2 G/DL (ref 3.5–5.2)
ALBUMIN/GLOB SERPL: 1 G/DL
ALP SERPL-CCNC: 93 U/L (ref 39–117)
ALT SERPL W P-5'-P-CCNC: 47 U/L (ref 1–41)
ANION GAP SERPL CALCULATED.3IONS-SCNC: 9 MMOL/L (ref 5–15)
AST SERPL-CCNC: 20 U/L (ref 1–40)
BH BB BLOOD EXPIRATION DATE: NORMAL
BH BB BLOOD TYPE BARCODE: 5100
BH BB DISPENSE STATUS: NORMAL
BH BB PRODUCT CODE: NORMAL
BH BB UNIT NUMBER: NORMAL
BILIRUB SERPL-MCNC: 0.2 MG/DL (ref 0–1.2)
BUN SERPL-MCNC: 37 MG/DL (ref 8–23)
BUN/CREAT SERPL: 32.5 (ref 7–25)
CALCIUM SPEC-SCNC: 8.9 MG/DL (ref 8.6–10.5)
CHLORIDE SERPL-SCNC: 104 MMOL/L (ref 98–107)
CO2 SERPL-SCNC: 23 MMOL/L (ref 22–29)
CREAT SERPL-MCNC: 1.14 MG/DL (ref 0.76–1.27)
CROSSMATCH INTERPRETATION: NORMAL
DEPRECATED RDW RBC AUTO: 46.6 FL (ref 37–54)
EGFRCR SERPLBLD CKD-EPI 2021: 70.5 ML/MIN/1.73
ERYTHROCYTE [DISTWIDTH] IN BLOOD BY AUTOMATED COUNT: 13.7 % (ref 12.3–15.4)
GLOBULIN UR ELPH-MCNC: 3.2 GM/DL
GLUCOSE BLDC GLUCOMTR-MCNC: 206 MG/DL (ref 70–130)
GLUCOSE BLDC GLUCOMTR-MCNC: 209 MG/DL (ref 70–130)
GLUCOSE BLDC GLUCOMTR-MCNC: 213 MG/DL (ref 70–130)
GLUCOSE BLDC GLUCOMTR-MCNC: 236 MG/DL (ref 70–130)
GLUCOSE BLDC GLUCOMTR-MCNC: 271 MG/DL (ref 70–130)
GLUCOSE SERPL-MCNC: 196 MG/DL (ref 65–99)
HBA1C MFR BLD: 5.6 % (ref 4.8–5.6)
HCT VFR BLD AUTO: 25.6 % (ref 37.5–51)
HGB BLD-MCNC: 8.5 G/DL (ref 13–17.7)
MCH RBC QN AUTO: 31.3 PG (ref 26.6–33)
MCHC RBC AUTO-ENTMCNC: 33.2 G/DL (ref 31.5–35.7)
MCV RBC AUTO: 94.1 FL (ref 79–97)
PLATELET # BLD AUTO: 164 10*3/MM3 (ref 140–450)
PMV BLD AUTO: 10.9 FL (ref 6–12)
POTASSIUM SERPL-SCNC: 4.3 MMOL/L (ref 3.5–5.2)
PROT SERPL-MCNC: 6.4 G/DL (ref 6–8.5)
RBC # BLD AUTO: 2.72 10*6/MM3 (ref 4.14–5.8)
SODIUM SERPL-SCNC: 136 MMOL/L (ref 136–145)
UNIT  ABO: NORMAL
UNIT  RH: NORMAL
WBC NRBC COR # BLD AUTO: 7.78 10*3/MM3 (ref 3.4–10.8)

## 2024-09-15 PROCEDURE — 94664 DEMO&/EVAL PT USE INHALER: CPT

## 2024-09-15 PROCEDURE — 25810000003 SODIUM CHLORIDE 0.9 % SOLUTION 250 ML FLEX CONT: Performed by: STUDENT IN AN ORGANIZED HEALTH CARE EDUCATION/TRAINING PROGRAM

## 2024-09-15 PROCEDURE — 94799 UNLISTED PULMONARY SVC/PX: CPT

## 2024-09-15 PROCEDURE — 83036 HEMOGLOBIN GLYCOSYLATED A1C: CPT | Performed by: HOSPITALIST

## 2024-09-15 PROCEDURE — 99232 SBSQ HOSP IP/OBS MODERATE 35: CPT | Performed by: HOSPITALIST

## 2024-09-15 PROCEDURE — 25010000002 DEXAMETHASONE PER 1 MG: Performed by: STUDENT IN AN ORGANIZED HEALTH CARE EDUCATION/TRAINING PROGRAM

## 2024-09-15 PROCEDURE — 63710000001 INSULIN LISPRO (HUMAN) PER 5 UNITS: Performed by: HOSPITALIST

## 2024-09-15 PROCEDURE — 25010000002 REMDESIVIR 100 MG/20ML SOLUTION 1 EACH VIAL: Performed by: STUDENT IN AN ORGANIZED HEALTH CARE EDUCATION/TRAINING PROGRAM

## 2024-09-15 PROCEDURE — 94761 N-INVAS EAR/PLS OXIMETRY MLT: CPT

## 2024-09-15 PROCEDURE — 85027 COMPLETE CBC AUTOMATED: CPT | Performed by: HOSPITALIST

## 2024-09-15 PROCEDURE — 82948 REAGENT STRIP/BLOOD GLUCOSE: CPT

## 2024-09-15 PROCEDURE — 25010000002 CEFEPIME PER 500 MG

## 2024-09-15 PROCEDURE — 80053 COMPREHEN METABOLIC PANEL: CPT | Performed by: STUDENT IN AN ORGANIZED HEALTH CARE EDUCATION/TRAINING PROGRAM

## 2024-09-15 RX ADMIN — VALPROIC ACID 150 MG: 250 SOLUTION ORAL at 05:11

## 2024-09-15 RX ADMIN — LINEZOLID 600 MG: 600 TABLET, FILM COATED ORAL at 09:22

## 2024-09-15 RX ADMIN — DEXAMETHASONE SODIUM PHOSPHATE 6 MG: 10 INJECTION INTRAMUSCULAR; INTRAVENOUS at 09:20

## 2024-09-15 RX ADMIN — TIMOLOL MALEATE 1 DROP: 5 SOLUTION/ DROPS OPHTHALMIC at 09:23

## 2024-09-15 RX ADMIN — BRIMONIDINE TARTRATE 1 DROP: 2 SOLUTION/ DROPS OPHTHALMIC at 20:10

## 2024-09-15 RX ADMIN — CLONIDINE HYDROCHLORIDE 0.2 MG: 0.2 TABLET ORAL at 05:11

## 2024-09-15 RX ADMIN — BRIMONIDINE TARTRATE 1 DROP: 2 SOLUTION/ DROPS OPHTHALMIC at 15:11

## 2024-09-15 RX ADMIN — INSULIN LISPRO 3 UNITS: 100 INJECTION, SOLUTION INTRAVENOUS; SUBCUTANEOUS at 12:02

## 2024-09-15 RX ADMIN — TIMOLOL MALEATE 1 DROP: 5 SOLUTION/ DROPS OPHTHALMIC at 20:10

## 2024-09-15 RX ADMIN — MINERAL OIL 5 ML: 1000 LIQUID ORAL at 20:15

## 2024-09-15 RX ADMIN — DOXYCYCLINE 100 MG: 100 CAPSULE ORAL at 12:02

## 2024-09-15 RX ADMIN — HYDROXYZINE HYDROCHLORIDE 25 MG: 25 TABLET, FILM COATED ORAL at 22:31

## 2024-09-15 RX ADMIN — AMLODIPINE BESYLATE 10 MG: 10 TABLET ORAL at 09:22

## 2024-09-15 RX ADMIN — VALPROIC ACID 150 MG: 250 SOLUTION ORAL at 15:11

## 2024-09-15 RX ADMIN — QUETIAPINE FUMARATE 25 MG: 25 TABLET ORAL at 20:08

## 2024-09-15 RX ADMIN — MINERAL OIL 5 ML: 1000 LIQUID ORAL at 09:23

## 2024-09-15 RX ADMIN — INSULIN LISPRO 4 UNITS: 100 INJECTION, SOLUTION INTRAVENOUS; SUBCUTANEOUS at 17:55

## 2024-09-15 RX ADMIN — CEFEPIME 2000 MG: 2 INJECTION, POWDER, FOR SOLUTION INTRAVENOUS at 06:13

## 2024-09-15 RX ADMIN — IPRATROPIUM BROMIDE AND ALBUTEROL SULFATE 3 ML: 2.5; .5 SOLUTION RESPIRATORY (INHALATION) at 12:14

## 2024-09-15 RX ADMIN — CLONIDINE HYDROCHLORIDE 0.2 MG: 0.2 TABLET ORAL at 15:11

## 2024-09-15 RX ADMIN — VALPROIC ACID 150 MG: 250 SOLUTION ORAL at 22:31

## 2024-09-15 RX ADMIN — IPRATROPIUM BROMIDE AND ALBUTEROL SULFATE 3 ML: 2.5; .5 SOLUTION RESPIRATORY (INHALATION) at 19:53

## 2024-09-15 RX ADMIN — ARIPIPRAZOLE 5 MG: 10 TABLET ORAL at 09:21

## 2024-09-15 RX ADMIN — BRIMONIDINE TARTRATE 1 DROP: 2 SOLUTION/ DROPS OPHTHALMIC at 09:23

## 2024-09-15 RX ADMIN — HYDRALAZINE HYDROCHLORIDE 100 MG: 50 TABLET ORAL at 05:12

## 2024-09-15 RX ADMIN — IPRATROPIUM BROMIDE AND ALBUTEROL SULFATE 3 ML: 2.5; .5 SOLUTION RESPIRATORY (INHALATION) at 08:40

## 2024-09-15 RX ADMIN — HYDRALAZINE HYDROCHLORIDE 100 MG: 50 TABLET ORAL at 15:11

## 2024-09-15 RX ADMIN — INSULIN LISPRO 3 UNITS: 100 INJECTION, SOLUTION INTRAVENOUS; SUBCUTANEOUS at 22:08

## 2024-09-15 RX ADMIN — LINEZOLID 600 MG: 600 TABLET, FILM COATED ORAL at 20:08

## 2024-09-15 RX ADMIN — INSULIN LISPRO 3 UNITS: 100 INJECTION, SOLUTION INTRAVENOUS; SUBCUTANEOUS at 09:19

## 2024-09-15 RX ADMIN — TERAZOSIN HYDROCHLORIDE 5 MG: 5 CAPSULE ORAL at 09:21

## 2024-09-15 RX ADMIN — CEFEPIME 2000 MG: 2 INJECTION, POWDER, FOR SOLUTION INTRAVENOUS at 22:10

## 2024-09-15 RX ADMIN — Medication 10 ML: at 09:25

## 2024-09-15 RX ADMIN — MINERAL OIL 5 ML: 1000 LIQUID ORAL at 12:02

## 2024-09-15 RX ADMIN — REMDESIVIR 100 MG: 100 INJECTION, POWDER, LYOPHILIZED, FOR SOLUTION INTRAVENOUS at 11:01

## 2024-09-15 RX ADMIN — CEFEPIME 2000 MG: 2 INJECTION, POWDER, FOR SOLUTION INTRAVENOUS at 15:09

## 2024-09-15 RX ADMIN — MINERAL OIL 5 ML: 1000 LIQUID ORAL at 17:45

## 2024-09-15 RX ADMIN — CARVEDILOL 12.5 MG: 12.5 TABLET, FILM COATED ORAL at 09:22

## 2024-09-15 RX ADMIN — TORSEMIDE 5 MG: 10 TABLET ORAL at 11:01

## 2024-09-15 RX ADMIN — IPRATROPIUM BROMIDE AND ALBUTEROL SULFATE 3 ML: 2.5; .5 SOLUTION RESPIRATORY (INHALATION) at 00:48

## 2024-09-15 NOTE — PLAN OF CARE
Problem: Adult Inpatient Plan of Care  Goal: Plan of Care Review  Outcome: Ongoing, Progressing  Goal: Patient-Specific Goal (Individualized)  Outcome: Ongoing, Progressing  Goal: Absence of Hospital-Acquired Illness or Injury  Outcome: Ongoing, Progressing  Intervention: Identify and Manage Fall Risk  Recent Flowsheet Documentation  Taken 9/15/2024 0423 by Tyler Nevarez RN  Safety Promotion/Fall Prevention:   activity supervised   assistive device/personal items within reach   clutter free environment maintained   fall prevention program maintained   nonskid shoes/slippers when out of bed   room organization consistent   safety round/check completed  Taken 9/15/2024 0215 by Tyler Nevarez RN  Safety Promotion/Fall Prevention:   activity supervised   assistive device/personal items within reach   clutter free environment maintained   fall prevention program maintained   nonskid shoes/slippers when out of bed   room organization consistent   safety round/check completed  Taken 9/15/2024 0030 by Tyler Nevarez RN  Safety Promotion/Fall Prevention:   activity supervised   assistive device/personal items within reach   clutter free environment maintained   fall prevention program maintained   gait belt   mobility aid in reach   nonskid shoes/slippers when out of bed   room organization consistent   safety round/check completed  Taken 9/14/2024 2212 by Tyler Nevarez RN  Safety Promotion/Fall Prevention:   activity supervised   assistive device/personal items within reach   clutter free environment maintained   fall prevention program maintained   gait belt   mobility aid in reach   nonskid shoes/slippers when out of bed   room organization consistent   safety round/check completed  Taken 9/14/2024 2010 by Tyler Nevarez RN  Safety Promotion/Fall Prevention:   activity supervised   assistive device/personal items within reach   clutter free environment maintained   fall prevention program maintained   gait belt    mobility aid in reach   nonskid shoes/slippers when out of bed   room organization consistent   safety round/check completed  Intervention: Prevent Skin Injury  Recent Flowsheet Documentation  Taken 9/15/2024 0423 by Tyler Nevarez RN  Body Position:   turned   left  Taken 9/15/2024 0030 by Tyler Nevarez RN  Body Position: right  Skin Protection:   adhesive use limited   incontinence pads utilized   silicone foam dressing in place   tubing/devices free from skin contact  Taken 9/14/2024 2212 by Tyler Nevarez RN  Body Position: supine  Taken 9/14/2024 2010 by Tyler Nevarez RN  Body Position:   side-lying   left  Skin Protection:   adhesive use limited   incontinence pads utilized   silicone foam dressing in place   tubing/devices free from skin contact  Intervention: Prevent and Manage VTE (Venous Thromboembolism) Risk  Recent Flowsheet Documentation  Taken 9/15/2024 0423 by Tyler Nevarez RN  Activity Management: activity encouraged  VTE Prevention/Management:   bilateral   sequential compression devices on  Taken 9/15/2024 0215 by Tyler Nevarez RN  Activity Management: activity encouraged  VTE Prevention/Management:   bilateral   sequential compression devices on  Taken 9/15/2024 0030 by Tyler Nevarez RN  Activity Management: activity encouraged  VTE Prevention/Management:   bilateral   sequential compression devices on  Taken 9/14/2024 2212 by Tyler Nevarez RN  Activity Management: activity encouraged  VTE Prevention/Management:   bilateral   sequential compression devices on  Taken 9/14/2024 2010 by Tyler Nevarez RN  Activity Management: activity encouraged  VTE Prevention/Management:   bilateral   sequential compression devices on  Range of Motion: active ROM (range of motion) encouraged  Intervention: Prevent Infection  Recent Flowsheet Documentation  Taken 9/15/2024 0423 by Tyler Nevarez RN  Infection Prevention:   cohorting utilized   environmental surveillance performed   personal  protective equipment utilized   rest/sleep promoted   single patient room provided  Taken 9/15/2024 0215 by Tyler Nevarez RN  Infection Prevention:   cohorting utilized   environmental surveillance performed   hand hygiene promoted   personal protective equipment utilized   rest/sleep promoted   single patient room provided  Taken 9/15/2024 0030 by Tyler Nevarez RN  Infection Prevention:   environmental surveillance performed   rest/sleep promoted   single patient room provided  Taken 9/14/2024 2212 by Tyler Nevarez RN  Infection Prevention:   environmental surveillance performed   personal protective equipment utilized   rest/sleep promoted   single patient room provided  Taken 9/14/2024 2010 by Tyler Nevarez RN  Infection Prevention:   environmental surveillance performed   personal protective equipment utilized   rest/sleep promoted   single patient room provided  Goal: Optimal Comfort and Wellbeing  Outcome: Ongoing, Progressing  Intervention: Monitor Pain and Promote Comfort  Recent Flowsheet Documentation  Taken 9/15/2024 0423 by Tyler Nevarez RN  Pain Management Interventions:   position adjusted   pillow support provided   quiet environment facilitated  Taken 9/15/2024 0215 by Tyler Nevarez RN  Pain Management Interventions:   position adjusted   pillow support provided  Taken 9/15/2024 0030 by Tyler Nevarez RN  Pain Management Interventions:   position adjusted   quiet environment facilitated  Taken 9/14/2024 2212 by Tyler Nevarez RN  Pain Management Interventions:   position adjusted   pillow support provided  Taken 9/14/2024 2010 by Tyler Nevarez RN  Pain Management Interventions:   position adjusted   pillow support provided   quiet environment facilitated  Intervention: Provide Person-Centered Care  Recent Flowsheet Documentation  Taken 9/15/2024 0423 by Tyler Nevarez RN  Trust Relationship/Rapport: care explained  Taken 9/15/2024 0215 by Tyler Nevarez RN  Trust  Relationship/Rapport:   care explained   questions encouraged   reassurance provided   thoughts/feelings acknowledged  Taken 9/15/2024 0030 by Tyler Nevarez RN  Trust Relationship/Rapport:   care explained   questions encouraged   reassurance provided  Taken 9/14/2024 2212 by Tyler Nevarez RN  Trust Relationship/Rapport:   care explained   questions encouraged   reassurance provided  Taken 9/14/2024 2010 by Tyler Nevarez RN  Trust Relationship/Rapport:   care explained   questions encouraged   reassurance provided  Goal: Readiness for Transition of Care  Outcome: Ongoing, Progressing     Problem: Skin Injury Risk Increased  Goal: Skin Health and Integrity  Outcome: Ongoing, Progressing  Intervention: Optimize Skin Protection  Recent Flowsheet Documentation  Taken 9/15/2024 0423 by Tyler Nevarez RN  Head of Bed (HOB) Positioning: HOB elevated  Taken 9/15/2024 0215 by Tyler Nevarez RN  Head of Bed (HOB) Positioning: HOB elevated  Taken 9/15/2024 0030 by Tyler Nevarez RN  Pressure Reduction Techniques:   frequent weight shift encouraged   weight shift assistance provided  Head of Bed (HOB) Positioning: HOB at 30-45 degrees  Pressure Reduction Devices:   pressure-redistributing mattress utilized   positioning supports utilized  Skin Protection:   adhesive use limited   incontinence pads utilized   silicone foam dressing in place   tubing/devices free from skin contact  Taken 9/14/2024 2212 by Tyler Nevarez RN  Head of Bed (HOB) Positioning: HOB at 30-45 degrees  Taken 9/14/2024 2010 by Tyler Nevarez RN  Pressure Reduction Techniques:   frequent weight shift encouraged   weight shift assistance provided   pressure points protected  Head of Bed (HOB) Positioning: HOB at 30-45 degrees  Pressure Reduction Devices:   pressure-redistributing mattress utilized   positioning supports utilized  Skin Protection:   adhesive use limited   incontinence pads utilized   silicone foam dressing in place   tubing/devices  free from skin contact     Problem: Fall Injury Risk  Goal: Absence of Fall and Fall-Related Injury  Outcome: Ongoing, Progressing  Intervention: Identify and Manage Contributors  Recent Flowsheet Documentation  Taken 9/15/2024 0423 by Tyler Nevarez RN  Self-Care Promotion: independence encouraged  Taken 9/15/2024 0030 by Tyler Nevarez RN  Self-Care Promotion: independence encouraged  Taken 9/14/2024 2212 by Tyler Nevarez RN  Self-Care Promotion: independence encouraged  Taken 9/14/2024 2010 by Tyler Nevarez RN  Medication Review/Management: medications reviewed  Self-Care Promotion: independence encouraged  Intervention: Promote Injury-Free Environment  Recent Flowsheet Documentation  Taken 9/15/2024 0423 by Tyler Nevarez RN  Safety Promotion/Fall Prevention:   activity supervised   assistive device/personal items within reach   clutter free environment maintained   fall prevention program maintained   nonskid shoes/slippers when out of bed   room organization consistent   safety round/check completed  Taken 9/15/2024 0215 by Tyler Nevarez RN  Safety Promotion/Fall Prevention:   activity supervised   assistive device/personal items within reach   clutter free environment maintained   fall prevention program maintained   nonskid shoes/slippers when out of bed   room organization consistent   safety round/check completed  Taken 9/15/2024 0030 by Tyler Nevarez RN  Safety Promotion/Fall Prevention:   activity supervised   assistive device/personal items within reach   clutter free environment maintained   fall prevention program maintained   gait belt   mobility aid in reach   nonskid shoes/slippers when out of bed   room organization consistent   safety round/check completed  Taken 9/14/2024 2212 by Tyler Nevarez RN  Safety Promotion/Fall Prevention:   activity supervised   assistive device/personal items within reach   clutter free environment maintained   fall prevention program maintained   gait  belt   mobility aid in reach   nonskid shoes/slippers when out of bed   room organization consistent   safety round/check completed  Taken 9/14/2024 2010 by Tyler Nevarez RN  Safety Promotion/Fall Prevention:   activity supervised   assistive device/personal items within reach   clutter free environment maintained   fall prevention program maintained   gait belt   mobility aid in reach   nonskid shoes/slippers when out of bed   room organization consistent   safety round/check completed     Problem: COPD (Chronic Obstructive Pulmonary Disease) Comorbidity  Goal: Maintenance of COPD Symptom Control  Outcome: Ongoing, Progressing  Intervention: Maintain COPD-Symptom Control  Recent Flowsheet Documentation  Taken 9/14/2024 2010 by Tyler Nevarez RN  Medication Review/Management: medications reviewed     Problem: Diabetes Comorbidity  Goal: Blood Glucose Level Within Targeted Range  Outcome: Ongoing, Progressing  Intervention: Monitor and Manage Glycemia  Recent Flowsheet Documentation  Taken 9/14/2024 2010 by Tyler Nevarez RN  Glycemic Management: blood glucose monitored     Problem: Hypertension Comorbidity  Goal: Blood Pressure in Desired Range  Outcome: Ongoing, Progressing  Intervention: Maintain Blood Pressure Management  Recent Flowsheet Documentation  Taken 9/14/2024 2010 by Tyler Nevarez RN  Medication Review/Management: medications reviewed     Problem: Fluid Imbalance (Pneumonia)  Goal: Fluid Balance  Outcome: Ongoing, Progressing     Problem: Infection (Pneumonia)  Goal: Resolution of Infection Signs and Symptoms  Outcome: Ongoing, Progressing  Intervention: Prevent Infection Progression  Recent Flowsheet Documentation  Taken 9/15/2024 0423 by Tyler Nevarez RN  Isolation Precautions: precautions maintained  Taken 9/15/2024 0215 by Tyler Nevarez RN  Isolation Precautions: precautions maintained  Taken 9/15/2024 0030 by Tyler Nevarez RN  Isolation Precautions: precautions maintained  Taken  9/14/2024 2212 by Tyler Nevarez RN  Isolation Precautions: precautions maintained  Taken 9/14/2024 2010 by Tyler Nevarez RN  Isolation Precautions:   precautions maintained   airborne   contact     Problem: Respiratory Compromise (Pneumonia)  Goal: Effective Oxygenation and Ventilation  Outcome: Ongoing, Progressing  Intervention: Optimize Oxygenation and Ventilation  Recent Flowsheet Documentation  Taken 9/15/2024 0423 by Tyler Nevarez RN  Head of Bed (HOB) Positioning: HOB elevated  Taken 9/15/2024 0215 by Tyler Nevarez RN  Head of Bed (HOB) Positioning: HOB elevated  Taken 9/15/2024 0030 by Tyler Nevarez RN  Head of Bed (HOB) Positioning: HOB at 30-45 degrees  Taken 9/14/2024 2212 by Tyler Nevarez RN  Head of Bed (HOB) Positioning: HOB at 30-45 degrees  Taken 9/14/2024 2010 by Tyler Nevarez RN  Head of Bed (HOB) Positioning: HOB at 30-45 degrees   Goal Outcome Evaluation:

## 2024-09-15 NOTE — PROGRESS NOTES
[facility] Medicine Services  PROGRESS NOTE    Patient Name: Omkar Nava  : 1955  MRN: [id number]    Date of Admission: 2024  Primary Care Physician: Alberto Dye MD    Subjective   Subjective     CC:  F/U AMS    HPI:  Seen this morning. Sister visiting at bedside. Patient currently hallucinating. Apparently did not sleep last night.       Objective   Objective     Vital Signs:   Temp:  [97.7 °F (36.5 °C)-98.3 °F (36.8 °C)] 98 °F (36.7 °C)  Heart Rate:  [53-65] 54  Resp:  [16-20] 18  BP: (148-175)/(60-81) 148/62  Flow (L/min):  [2] 2     Physical Exam:  Gen-no acute distress  HENT-NCAT, mucous membranes moist  CV-RRR, S1 S2 normal, no m/r/g  Resp-overall CTAB, no wheezes or rales  Abd-soft, NT, ND, +BS  Ext-no edema  Neuro-awake, +visual hallucinations, answers most questions appropriately however  Skin-no rashes  Psych-appropriate mood      Results Reviewed:  LAB RESULTS:      Lab 09/15/24  0657 24  0543 24  0746 24  0343 24  1816   WBC 7.78 7.83 7.80 6.57 6.37   HEMOGLOBIN 8.5* 8.4* 9.2* 8.6* 6.9*   HEMATOCRIT 25.6* 25.6* 28.9* 26.9* 22.4*   PLATELETS 164 170 184 165 173   NEUTROS ABS  --   --  5.84 4.32 4.42   IMMATURE GRANS (ABS)  --   --  0.03 0.02 0.03   LYMPHS ABS  --   --  1.28 1.17 0.92   MONOS ABS  --   --  0.61 1.04* 0.94*   EOS ABS  --   --  0.03 0.01 0.04   MCV 94.1 93.1 96.0 96.1 100.9*   SED RATE  --   --   --   --  27*   CRP  --  4.89* 3.67*  --  1.64*   PROCALCITONIN  --   --   --   --  0.11   LACTATE  --   --   --   --  0.5         Lab 09/15/24  0658 09/15/24  0657 24  0543 24  0746 24  0725 24  0342 24  1816   SODIUM  --  136 137 137  --  134* 132*   POTASSIUM  --  4.3 4.7 5.4* 5.3* 6.3* 6.4*   CHLORIDE  --  104 104 105  --  104 101   CO2  --  23.0 22.0 22.0  --  19.0* 22.0   ANION GAP  --  9.0 11.0 10.0  --  11.0 9.0   BUN  --  37* 32* 30*  --  34* 40*   CREATININE  --  1.14 1.17 1.22  --  1.16  1.62*   EGFR  --  70.5 68.3 65.0  --  69.0 46.2*   GLUCOSE  --  196* 150* 97  --  75 87   CALCIUM  --  8.9 8.4* 9.1  --  8.8 8.7   MAGNESIUM  --   --  1.6  --   --  1.9 2.2   PHOSPHORUS  --   --  3.4  --   --   --   --    HEMOGLOBIN A1C 5.60  --   --   --   --   --   --          Lab 09/15/24  0657 09/14/24  0543 09/13/24  0746 09/12/24  0342 09/11/24  1816   TOTAL PROTEIN 6.4 6.0 6.5 6.8 6.8   ALBUMIN 3.2* 3.3* 3.6 3.3* 3.5   GLOBULIN 3.2 2.7 2.9 3.5 3.3   ALT (SGPT) 47* 55* 70* 78* 85*   AST (SGOT) 20 27 36 37 37   BILIRUBIN 0.2 0.3 0.2 0.2 <0.2   ALK PHOS 93 105 96 91 93                 Lab 09/11/24 2005 09/11/24  1816   IRON  --  26*   IRON SATURATION (TSAT)  --  7*   TIBC  --  355   TRANSFERRIN  --  238   FERRITIN  --  308.30   FOLATE  --  12.10   VITAMIN B 12  --  1,158*   ABO TYPING O  --    RH TYPING Positive  --    ANTIBODY SCREEN Negative  --          Lab 09/11/24  2151   PH, ARTERIAL 7.369   PCO2, ARTERIAL 42.2   PO2 .0*   FIO2 21   HCO3 ART 24.3   BASE EXCESS ART -1.0*   CARBOXYHEMOGLOBIN 1.3     Brief Urine Lab Results  (Last result in the past 365 days)        Color   Clarity   Blood   Leuk Est   Nitrite   Protein   CREAT   Urine HCG        09/11/24 1905 Yellow   Clear   Negative   Trace   Negative   Trace                   Microbiology Results Abnormal       Procedure Component Value - Date/Time    Blood Culture - Blood, Arm, Right [[id number]]  (Normal) Collected: 09/11/24 2144    Lab Status: Preliminary result Specimen: Blood from Arm, Right Updated: 09/14/24 2246     Blood Culture No growth at 3 days    Narrative:      Less than seven (7) mL's of blood was collected.  Insufficient quantity may yield false negative results.    Blood Culture - Blood, Arm, Left [[id number]]  (Normal) Collected: 09/11/24 2144    Lab Status: Preliminary result Specimen: Blood from Arm, Left Updated: 09/14/24 2246     Blood Culture No growth at 3 days    Narrative:      Less than seven (7) mL's of blood was  collected.  Insufficient quantity may yield false negative results.    S. Pneumo Ag Urine or CSF - Urine, Urine, Clean Catch [[id number]]  (Normal) Collected: 09/12/24 1158    Lab Status: Final result Specimen: Urine, Clean Catch Updated: 09/12/24 1902     Strep Pneumo Ag Negative    Legionella Antigen, Urine - Urine, Urine, Clean Catch [[id number]]  (Normal) Collected: 09/12/24 1156    Lab Status: Final result Specimen: Urine, Clean Catch Updated: 09/12/24 1902     LEGIONELLA ANTIGEN, URINE Negative            FL Video Swallow With Speech Single Contrast    Result Date: 9/13/2024  FL VIDEO SWALLOW W SPEECH SINGLE-CONTRAST Date of Exam: 9/13/2024 3:30 PM EDT Indication: dysphagia.   Comparison: None available. Technique:   The speech pathologist administered food and/or liquid mixed with barium to the patient with cine/video imaging.  Imaging assistance was provided to the speech pathologist and an image was saved. Fluoroscopic Time: 1 minute and 24 seconds Number of Images: 6 associated fluoroscopic loops were saved Findings: No aspiration was seen during fluoroscopic guided modified barium swallowing series. Please see speech therapy report for full details and recommendations.     Impression: Impression: Fluoroscopy provided for a modified barium swallow. No aspiration was seen during swallowing evaluation. Please see speech therapy report for full details and recommendations. Report dictated by: Maggi Silverman PA-c  I have personally reviewed this case and agree with the findings above: Electronically Signed: Anjum Prado MD  9/13/2024 3:57 PM EDT  Workstation ID: [id number]     Results for orders placed during the hospital encounter of 01/15/24    Adult Transthoracic Echo Complete With Contrast if Necessary Per Protocol    Interpretation Summary    Left ventricular ejection fraction appears to be 56 - 60%.    Left ventricular wall thickness is consistent with hypertrophy.    Estimated right ventricular systolic  pressure from tricuspid regurgitation is mildly elevated (35-45 mmHg).    There is a small (1-2cm) pericardial effusion.    No evidence for pericardial tamponade      Current medications:  Scheduled Meds:amLODIPine, 10 mg, Per G Tube, Q24H  ARIPiprazole, 5 mg, Per G Tube, Daily  brimonidine, 1 drop, Both Eyes, TID  carvedilol, 12.5 mg, Per G Tube, Q12H  cefepime, 2,000 mg, Intravenous, Q8H  cloNIDine, 0.2 mg, Per G Tube, Q8H  dexAMETHasone, 6 mg, Per G Tube, Daily   Or  dexAMETHasone, 6 mg, Intravenous, Daily  doxycycline, 100 mg, Per G Tube, Q12H  hydrALAZINE, 100 mg, Per G Tube, Q8H  insulin lispro, 2-7 Units, Subcutaneous, 4x Daily AC & at Bedtime  ipratropium-albuterol, 3 mL, Nebulization, Q6H - RT  lansoprazole, 30 mg, Per G Tube, Q AM  linezolid, 600 mg, Per PEG Tube, BID  palliative care oral rinse, 5 mL, Mouth/Throat, 4x Daily  [Held by provider] QUEtiapine, 25 mg, Per G Tube, Nightly  remdesivir, 100 mg, Intravenous, Q24H  sodium chloride, 10 mL, Intravenous, Q12H  terazosin, 5 mg, Per G Tube, Q12H  timolol, 1 drop, Both Eyes, BID  torsemide, 5 mg, Per G Tube, Daily  Valproic Acid, 150 mg, Per G Tube, Q8H      Continuous Infusions:Pharmacy Consult - Remdesivir,       PRN Meds:.  acetaminophen    senna-docusate sodium **AND** polyethylene glycol **AND** [DISCONTINUED] bisacodyl **AND** bisacodyl    Calcium Replacement - Follow Nurse / BPA Driven Protocol    dextrose    dextrose    glucagon (human recombinant)    [Held by provider] hydrOXYzine    Magnesium Standard Dose Replacement - Follow Nurse / BPA Driven Protocol    Pharmacy Consult - Remdesivir    Phosphorus Replacement - Follow Nurse / BPA Driven Protocol    Potassium Replacement - Follow Nurse / BPA Driven Protocol    sodium chloride    sodium chloride    Assessment & Plan   Assessment & Plan     Active Hospital Problems    Diagnosis  POA    **Hypoxia [R09.02]  Yes    COVID-19 virus detected [U07.1]  Unknown    Bilateral pneumonia [J18.9]  Yes     Encephalopathy [G93.40]  Yes    Chronic kidney disease [N18.9]  Yes    Aspiration pneumonia [J69.0]  Yes    Dementia [F03.90]  Yes    Anemia of chronic disease [D63.8]  Yes    Neurocognitive disorder [R41.9]  Yes    Essential hypertension [I10]  Yes    Type 2 diabetes mellitus [E11.9]  Yes    Iron deficiency anemia [D50.9]  Yes      Resolved Hospital Problems   No resolved problems to display.        Brief Hospital Course to date:  Omkar Nava is a 69 y.o. male with PMH significant for diabetes mellitus, complicated by blindness, osteomyelitis, CKD, early onset dementia with psychosis, decreased mobility, and heart failure with preserved EF. He presents from [facility] for decreased mental status and fever.     Recent admission 3/10-4/4/24 due to hypoxic respiratory failure requiring intubation in setting of recurrent aspiration and multifocal pneumonia; respiratory culture grew ESBL Klebsiella at that time. Had PEG tube placed at that time.     This patient's problems and plans were partially entered by my partner and updated as appropriate by me 09/15/24. Copied text in this note has been reviewed and is accurate as of today's date.     Acute hypoxia  Sepsis secondary to multifocal pneumonia  History of ESBL Klebsiella  COVID-19 positive  --Presented tachypneic, febrile, and oxygen requirement of 3 L - patient reports he is on 2 liters at [facility] but does not wear all the time  --COVID-19 test positive day of admission. Multifocal pneumonia on CT scan. Chronic aspiration with PEG tube in place. Suspect findings related to chronic aspiration and acute COVID, however, note history of severe respiratory failure and ESBL Klebsiella in previous sputum culture.  --ID/Dr. Gordon following, states that likely aspiration PNA + possible secondary bacterial infection due to COVID. States that slightly increased risk of ESBL but more distant. Recommends Cefepime and Doxycycline for now.     --Continue Remdesivir +  Decadron - will complete Remdesivir 9/16/24   --Blood cultures NGTD, negative urinary antigens, positive MRSA PCR  --Aggressive bronchopulmonary hygiene, IS, OPEP, suctioning  --Weaned from 4 liters to 2 liters  --Monitor for further fevers, last was 101 on 9/13/24 AM     Acute on chronic anemia  --Baseline Hb near 8, Hb was 6.9 on presentation  --No signs of bleeding at this time. MCV is elevated. Fecal occult in ED is negative. Suspect related to acute on chronic illness.  --B12 and folate okay. Iron studies c/w anemia of chronic disease.  --s/p 1 unit PRBC on 9/11/24, monitor closely     Hyperkalemia  --K 6.4 on admission, received treatment in ED  --Improved, monitor     JAYY on CKD  --Cr 1.62 on admission but has fluctuated significantly over the last year. Last value 0.92 ~5 months ago. Will monitor output, hold nephrotoxic meds.  --Better with IVF's     Chronic aspiration  --PEG tube in place (placed during recent admission 4/1/24 by Dr. Brunner), was discharged with strict NPO status but since then has been able to advance back to PO diet at [facility] per family  --SLP has seen and performed MBS 9/13/24: soft to chew, ground, thin liquids  --Tube feeds per Nutrition     HFpEF  HTN  --Appears compensated at this time   --Continue Coreg, Torsemide, Clonidine, Terazosin, Hydralazine      T2DM with hypoglycemia  --HbA1c 7% October 2022 - repeat here 5.6%  --Resolved, monitor  --SSI, adjust as needed     Dementia with agitation  Seizure disorder?  --Given decreased responsiveness leading to initial presentation we have been holding most of sedating meds for now.  Placed on Depakote for agitation per Neurology in past? - now resumed here  --Resumed Seroquel, Abilify       Expected Discharge Location and Transportation: back to [facility]  Expected Discharge 1-2 days pending final ID recs   Expected Discharge Date: 9/16/2024; Expected Discharge Time:      VTE Prophylaxis:  Mechanical VTE prophylaxis orders  are present.         AM-PAC 6 Clicks Score (PT): 10 (09/15/24 0930)    CODE STATUS:   Code Status and Medical Interventions: CPR (Attempt to Resuscitate); Full Support; Cannot reach family, deferring to prior   Ordered at: 09/12/24 0003     Code Status (Patient has no pulse and is not breathing):    CPR (Attempt to Resuscitate)     Medical Interventions (Patient has pulse or is breathing):    Full Support     Comments:    Cannot reach family, deferring to prior       Ludmila Parish MD  09/15/24

## 2024-09-16 LAB
ALBUMIN SERPL-MCNC: 3.3 G/DL (ref 3.5–5.2)
ALBUMIN/GLOB SERPL: 1 G/DL
ALP SERPL-CCNC: 102 U/L (ref 39–117)
ALT SERPL W P-5'-P-CCNC: 49 U/L (ref 1–41)
ANION GAP SERPL CALCULATED.3IONS-SCNC: 10 MMOL/L (ref 5–15)
AST SERPL-CCNC: 28 U/L (ref 1–40)
BACTERIA SPEC AEROBE CULT: NORMAL
BACTERIA SPEC AEROBE CULT: NORMAL
BILIRUB SERPL-MCNC: 0.2 MG/DL (ref 0–1.2)
BUN SERPL-MCNC: 41 MG/DL (ref 8–23)
BUN/CREAT SERPL: 32.3 (ref 7–25)
CALCIUM SPEC-SCNC: 9 MG/DL (ref 8.6–10.5)
CHLORIDE SERPL-SCNC: 105 MMOL/L (ref 98–107)
CO2 SERPL-SCNC: 23 MMOL/L (ref 22–29)
CREAT SERPL-MCNC: 1.27 MG/DL (ref 0.76–1.27)
DEPRECATED RDW RBC AUTO: 47.5 FL (ref 37–54)
EGFRCR SERPLBLD CKD-EPI 2021: 61.9 ML/MIN/1.73
ERYTHROCYTE [DISTWIDTH] IN BLOOD BY AUTOMATED COUNT: 13.7 % (ref 12.3–15.4)
GLOBULIN UR ELPH-MCNC: 3.3 GM/DL
GLUCOSE BLDC GLUCOMTR-MCNC: 181 MG/DL (ref 70–130)
GLUCOSE BLDC GLUCOMTR-MCNC: 185 MG/DL (ref 70–130)
GLUCOSE BLDC GLUCOMTR-MCNC: 291 MG/DL (ref 70–130)
GLUCOSE BLDC GLUCOMTR-MCNC: 297 MG/DL (ref 70–130)
GLUCOSE SERPL-MCNC: 200 MG/DL (ref 65–99)
HCT VFR BLD AUTO: 26.4 % (ref 37.5–51)
HGB BLD-MCNC: 8.6 G/DL (ref 13–17.7)
MCH RBC QN AUTO: 30.6 PG (ref 26.6–33)
MCHC RBC AUTO-ENTMCNC: 32.6 G/DL (ref 31.5–35.7)
MCV RBC AUTO: 94 FL (ref 79–97)
PLATELET # BLD AUTO: 190 10*3/MM3 (ref 140–450)
PMV BLD AUTO: 11.1 FL (ref 6–12)
POTASSIUM SERPL-SCNC: 4.8 MMOL/L (ref 3.5–5.2)
PROT SERPL-MCNC: 6.6 G/DL (ref 6–8.5)
RBC # BLD AUTO: 2.81 10*6/MM3 (ref 4.14–5.8)
SODIUM SERPL-SCNC: 138 MMOL/L (ref 136–145)
WBC NRBC COR # BLD AUTO: 9.53 10*3/MM3 (ref 3.4–10.8)

## 2024-09-16 PROCEDURE — 80053 COMPREHEN METABOLIC PANEL: CPT | Performed by: STUDENT IN AN ORGANIZED HEALTH CARE EDUCATION/TRAINING PROGRAM

## 2024-09-16 PROCEDURE — 63710000001 DEXAMETHASONE PER 0.25 MG: Performed by: STUDENT IN AN ORGANIZED HEALTH CARE EDUCATION/TRAINING PROGRAM

## 2024-09-16 PROCEDURE — 82948 REAGENT STRIP/BLOOD GLUCOSE: CPT

## 2024-09-16 PROCEDURE — 94664 DEMO&/EVAL PT USE INHALER: CPT

## 2024-09-16 PROCEDURE — 25010000002 REMDESIVIR 100 MG/20ML SOLUTION 1 EACH VIAL: Performed by: STUDENT IN AN ORGANIZED HEALTH CARE EDUCATION/TRAINING PROGRAM

## 2024-09-16 PROCEDURE — 99232 SBSQ HOSP IP/OBS MODERATE 35: CPT | Performed by: HOSPITALIST

## 2024-09-16 PROCEDURE — 85027 COMPLETE CBC AUTOMATED: CPT | Performed by: HOSPITALIST

## 2024-09-16 PROCEDURE — 25810000003 SODIUM CHLORIDE 0.9 % SOLUTION 250 ML FLEX CONT: Performed by: STUDENT IN AN ORGANIZED HEALTH CARE EDUCATION/TRAINING PROGRAM

## 2024-09-16 PROCEDURE — 25010000002 CEFEPIME PER 500 MG

## 2024-09-16 PROCEDURE — 63710000001 INSULIN LISPRO (HUMAN) PER 5 UNITS: Performed by: HOSPITALIST

## 2024-09-16 PROCEDURE — 94799 UNLISTED PULMONARY SVC/PX: CPT

## 2024-09-16 RX ADMIN — INSULIN LISPRO 2 UNITS: 100 INJECTION, SOLUTION INTRAVENOUS; SUBCUTANEOUS at 12:47

## 2024-09-16 RX ADMIN — TERAZOSIN HYDROCHLORIDE 5 MG: 5 CAPSULE ORAL at 21:08

## 2024-09-16 RX ADMIN — HYDRALAZINE HYDROCHLORIDE 100 MG: 50 TABLET ORAL at 05:50

## 2024-09-16 RX ADMIN — DEXAMETHASONE 6 MG: 4 TABLET ORAL at 10:02

## 2024-09-16 RX ADMIN — CLONIDINE HYDROCHLORIDE 0.2 MG: 0.2 TABLET ORAL at 14:50

## 2024-09-16 RX ADMIN — BRIMONIDINE TARTRATE 1 DROP: 2 SOLUTION/ DROPS OPHTHALMIC at 21:08

## 2024-09-16 RX ADMIN — IPRATROPIUM BROMIDE AND ALBUTEROL SULFATE 3 ML: 2.5; .5 SOLUTION RESPIRATORY (INHALATION) at 07:31

## 2024-09-16 RX ADMIN — LANSOPRAZOLE 30 MG: 15 TABLET, ORALLY DISINTEGRATING ORAL at 05:50

## 2024-09-16 RX ADMIN — TERAZOSIN HYDROCHLORIDE 5 MG: 5 CAPSULE ORAL at 10:04

## 2024-09-16 RX ADMIN — INSULIN LISPRO 4 UNITS: 100 INJECTION, SOLUTION INTRAVENOUS; SUBCUTANEOUS at 21:05

## 2024-09-16 RX ADMIN — MINERAL OIL 5 ML: 1000 LIQUID ORAL at 21:08

## 2024-09-16 RX ADMIN — DOXYCYCLINE 100 MG: 100 CAPSULE ORAL at 00:56

## 2024-09-16 RX ADMIN — HYDRALAZINE HYDROCHLORIDE 100 MG: 50 TABLET ORAL at 14:50

## 2024-09-16 RX ADMIN — CLONIDINE HYDROCHLORIDE 0.2 MG: 0.2 TABLET ORAL at 05:50

## 2024-09-16 RX ADMIN — Medication 10 ML: at 21:11

## 2024-09-16 RX ADMIN — CEFEPIME 2000 MG: 2 INJECTION, POWDER, FOR SOLUTION INTRAVENOUS at 05:47

## 2024-09-16 RX ADMIN — TIMOLOL MALEATE 1 DROP: 5 SOLUTION/ DROPS OPHTHALMIC at 10:19

## 2024-09-16 RX ADMIN — LINEZOLID 600 MG: 600 TABLET, FILM COATED ORAL at 10:02

## 2024-09-16 RX ADMIN — ARIPIPRAZOLE 5 MG: 10 TABLET ORAL at 10:04

## 2024-09-16 RX ADMIN — TIMOLOL MALEATE 1 DROP: 5 SOLUTION/ DROPS OPHTHALMIC at 21:08

## 2024-09-16 RX ADMIN — CLONIDINE HYDROCHLORIDE 0.2 MG: 0.2 TABLET ORAL at 21:08

## 2024-09-16 RX ADMIN — QUETIAPINE FUMARATE 25 MG: 25 TABLET ORAL at 21:07

## 2024-09-16 RX ADMIN — MINERAL OIL 5 ML: 1000 LIQUID ORAL at 10:20

## 2024-09-16 RX ADMIN — REMDESIVIR 100 MG: 100 INJECTION, POWDER, LYOPHILIZED, FOR SOLUTION INTRAVENOUS at 09:55

## 2024-09-16 RX ADMIN — BRIMONIDINE TARTRATE 1 DROP: 2 SOLUTION/ DROPS OPHTHALMIC at 18:55

## 2024-09-16 RX ADMIN — DOXYCYCLINE 100 MG: 100 CAPSULE ORAL at 12:47

## 2024-09-16 RX ADMIN — BRIMONIDINE TARTRATE 1 DROP: 2 SOLUTION/ DROPS OPHTHALMIC at 10:21

## 2024-09-16 RX ADMIN — Medication 10 ML: at 10:00

## 2024-09-16 RX ADMIN — LINEZOLID 600 MG: 600 TABLET, FILM COATED ORAL at 21:08

## 2024-09-16 RX ADMIN — CEFEPIME 2000 MG: 2 INJECTION, POWDER, FOR SOLUTION INTRAVENOUS at 22:10

## 2024-09-16 RX ADMIN — VALPROIC ACID 150 MG: 250 SOLUTION ORAL at 21:07

## 2024-09-16 RX ADMIN — VALPROIC ACID 150 MG: 250 SOLUTION ORAL at 05:50

## 2024-09-16 RX ADMIN — INSULIN LISPRO 2 UNITS: 100 INJECTION, SOLUTION INTRAVENOUS; SUBCUTANEOUS at 10:06

## 2024-09-16 RX ADMIN — CEFEPIME 2000 MG: 2 INJECTION, POWDER, FOR SOLUTION INTRAVENOUS at 14:52

## 2024-09-16 RX ADMIN — VALPROIC ACID 150 MG: 250 SOLUTION ORAL at 14:50

## 2024-09-16 RX ADMIN — AMLODIPINE BESYLATE 10 MG: 10 TABLET ORAL at 10:02

## 2024-09-16 RX ADMIN — MINERAL OIL 5 ML: 1000 LIQUID ORAL at 12:00

## 2024-09-16 RX ADMIN — IPRATROPIUM BROMIDE AND ALBUTEROL SULFATE 3 ML: 2.5; .5 SOLUTION RESPIRATORY (INHALATION) at 19:48

## 2024-09-16 RX ADMIN — CARVEDILOL 12.5 MG: 12.5 TABLET, FILM COATED ORAL at 10:05

## 2024-09-16 RX ADMIN — INSULIN LISPRO 4 UNITS: 100 INJECTION, SOLUTION INTRAVENOUS; SUBCUTANEOUS at 18:55

## 2024-09-16 RX ADMIN — HYDRALAZINE HYDROCHLORIDE 100 MG: 50 TABLET ORAL at 21:07

## 2024-09-16 RX ADMIN — CARVEDILOL 12.5 MG: 12.5 TABLET, FILM COATED ORAL at 21:07

## 2024-09-16 NOTE — PROGRESS NOTES
[facility] Medicine Services  PROGRESS NOTE    Patient Name: Omkar Nava  : 1955  MRN: [id number]    Date of Admission: 2024  Primary Care Physician: Alberto Dye MD    Subjective   Subjective     CC:  F/U AMS    HPI:  Seen this morning. No family present today. He says he didn't sleep much last night, didn't eat much for breakfast this morning. No other complaints, denies dyspnea.      Objective   Objective     Vital Signs:   Temp:  [97.3 °F (36.3 °C)-98.7 °F (37.1 °C)] 98.5 °F (36.9 °C)  Heart Rate:  [53-64] 58  Resp:  [18] 18  BP: (160-188)/(68-96) 160/68  Flow (L/min):  [2] 2     Physical Exam:  Gen-no acute distress  HENT-NCAT, mucous membranes moist  CV-RRR, S1 S2 normal, no m/r/g  Resp-overall CTAB, no wheezes or rales  Abd-soft, NT, ND, +BS  Ext-no edema  Neuro-awake, less confused compared to yesterday, answers most questions appropriately   Skin-no rashes  Psych-appropriate mood      Results Reviewed:  LAB RESULTS:      Lab 09/15/24  0657 24  0543 24  0746 24  0343 24  1816   WBC 7.78 7.83 7.80 6.57 6.37   HEMOGLOBIN 8.5* 8.4* 9.2* 8.6* 6.9*   HEMATOCRIT 25.6* 25.6* 28.9* 26.9* 22.4*   PLATELETS 164 170 184 165 173   NEUTROS ABS  --   --  5.84 4.32 4.42   IMMATURE GRANS (ABS)  --   --  0.03 0.02 0.03   LYMPHS ABS  --   --  1.28 1.17 0.92   MONOS ABS  --   --  0.61 1.04* 0.94*   EOS ABS  --   --  0.03 0.01 0.04   MCV 94.1 93.1 96.0 96.1 100.9*   SED RATE  --   --   --   --  27*   CRP  --  4.89* 3.67*  --  1.64*   PROCALCITONIN  --   --   --   --  0.11   LACTATE  --   --   --   --  0.5         Lab 09/15/24  0658 09/15/24  0657 24  0543 24  0746 24  0725 24  0342 24  1816   SODIUM  --  136 137 137  --  134* 132*   POTASSIUM  --  4.3 4.7 5.4* 5.3* 6.3* 6.4*   CHLORIDE  --  104 104 105  --  104 101   CO2  --  23.0 22.0 22.0  --  19.0* 22.0   ANION GAP  --  9.0 11.0 10.0  --  11.0 9.0   BUN  --  37* 32* 30*  --   34* 40*   CREATININE  --  1.14 1.17 1.22  --  1.16 1.62*   EGFR  --  70.5 68.3 65.0  --  69.0 46.2*   GLUCOSE  --  196* 150* 97  --  75 87   CALCIUM  --  8.9 8.4* 9.1  --  8.8 8.7   MAGNESIUM  --   --  1.6  --   --  1.9 2.2   PHOSPHORUS  --   --  3.4  --   --   --   --    HEMOGLOBIN A1C 5.60  --   --   --   --   --   --          Lab 09/15/24  0657 09/14/24  0543 09/13/24  0746 09/12/24  0342 09/11/24  1816   TOTAL PROTEIN 6.4 6.0 6.5 6.8 6.8   ALBUMIN 3.2* 3.3* 3.6 3.3* 3.5   GLOBULIN 3.2 2.7 2.9 3.5 3.3   ALT (SGPT) 47* 55* 70* 78* 85*   AST (SGOT) 20 27 36 37 37   BILIRUBIN 0.2 0.3 0.2 0.2 <0.2   ALK PHOS 93 105 96 91 93                 Lab 09/11/24 2005 09/11/24  1816   IRON  --  26*   IRON SATURATION (TSAT)  --  7*   TIBC  --  355   TRANSFERRIN  --  238   FERRITIN  --  308.30   FOLATE  --  12.10   VITAMIN B 12  --  1,158*   ABO TYPING O  --    RH TYPING Positive  --    ANTIBODY SCREEN Negative  --          Lab 09/11/24  2151   PH, ARTERIAL 7.369   PCO2, ARTERIAL 42.2   PO2 .0*   FIO2 21   HCO3 ART 24.3   BASE EXCESS ART -1.0*   CARBOXYHEMOGLOBIN 1.3     Brief Urine Lab Results  (Last result in the past 365 days)        Color   Clarity   Blood   Leuk Est   Nitrite   Protein   CREAT   Urine HCG        09/11/24 1905 Yellow   Clear   Negative   Trace   Negative   Trace                   Microbiology Results Abnormal       Procedure Component Value - Date/Time    Blood Culture - Blood, Arm, Right [[id number]]  (Normal) Collected: 09/11/24 2144    Lab Status: Preliminary result Specimen: Blood from Arm, Right Updated: 09/15/24 2246     Blood Culture No growth at 4 days    Narrative:      Less than seven (7) mL's of blood was collected.  Insufficient quantity may yield false negative results.    Blood Culture - Blood, Arm, Left [[id number]]  (Normal) Collected: 09/11/24 2144    Lab Status: Preliminary result Specimen: Blood from Arm, Left Updated: 09/15/24 2246     Blood Culture No growth at 4 days    Narrative:       Less than seven (7) mL's of blood was collected.  Insufficient quantity may yield false negative results.    S. Pneumo Ag Urine or CSF - Urine, Urine, Clean Catch [[id number]]  (Normal) Collected: 09/12/24 1158    Lab Status: Final result Specimen: Urine, Clean Catch Updated: 09/12/24 1902     Strep Pneumo Ag Negative    Legionella Antigen, Urine - Urine, Urine, Clean Catch [[id number]]  (Normal) Collected: 09/12/24 1156    Lab Status: Final result Specimen: Urine, Clean Catch Updated: 09/12/24 1902     LEGIONELLA ANTIGEN, URINE Negative            No radiology results from the last 24 hrs    Results for orders placed during the hospital encounter of 01/15/24    Adult Transthoracic Echo Complete With Contrast if Necessary Per Protocol    Interpretation Summary    Left ventricular ejection fraction appears to be 56 - 60%.    Left ventricular wall thickness is consistent with hypertrophy.    Estimated right ventricular systolic pressure from tricuspid regurgitation is mildly elevated (35-45 mmHg).    There is a small (1-2cm) pericardial effusion.    No evidence for pericardial tamponade      Current medications:  Scheduled Meds:amLODIPine, 10 mg, Per G Tube, Q24H  ARIPiprazole, 5 mg, Per G Tube, Daily  brimonidine, 1 drop, Both Eyes, TID  carvedilol, 12.5 mg, Per G Tube, Q12H  cefepime, 2,000 mg, Intravenous, Q8H  cloNIDine, 0.2 mg, Per G Tube, Q8H  dexAMETHasone, 6 mg, Per G Tube, Daily   Or  dexAMETHasone, 6 mg, Intravenous, Daily  doxycycline, 100 mg, Per G Tube, Q12H  hydrALAZINE, 100 mg, Per G Tube, Q8H  insulin lispro, 2-7 Units, Subcutaneous, 4x Daily AC & at Bedtime  ipratropium-albuterol, 3 mL, Nebulization, Q6H - RT  lansoprazole, 30 mg, Per G Tube, Q AM  linezolid, 600 mg, Per PEG Tube, BID  palliative care oral rinse, 5 mL, Mouth/Throat, 4x Daily  QUEtiapine, 25 mg, Per G Tube, Nightly  sodium chloride, 10 mL, Intravenous, Q12H  terazosin, 5 mg, Per G Tube, Q12H  timolol, 1 drop, Both Eyes,  BID  torsemide, 5 mg, Per G Tube, Daily  Valproic Acid, 150 mg, Per G Tube, Q8H      Continuous Infusions:Pharmacy Consult - Remdesivir,       PRN Meds:.  acetaminophen    senna-docusate sodium **AND** polyethylene glycol **AND** [DISCONTINUED] bisacodyl **AND** bisacodyl    Calcium Replacement - Follow Nurse / BPA Driven Protocol    dextrose    glucagon (human recombinant)    hydrOXYzine    Magnesium Standard Dose Replacement - Follow Nurse / BPA Driven Protocol    Pharmacy Consult - Remdesivir    Phosphorus Replacement - Follow Nurse / BPA Driven Protocol    Potassium Replacement - Follow Nurse / BPA Driven Protocol    sodium chloride    sodium chloride    Assessment & Plan   Assessment & Plan     Active Hospital Problems    Diagnosis  POA    **Hypoxia [R09.02]  Yes    COVID-19 virus detected [U07.1]  Unknown    Bilateral pneumonia [J18.9]  Yes    Encephalopathy [G93.40]  Yes    Chronic kidney disease [N18.9]  Yes    Aspiration pneumonia [J69.0]  Yes    Dementia [F03.90]  Yes    Anemia of chronic disease [D63.8]  Yes    Neurocognitive disorder [R41.9]  Yes    Essential hypertension [I10]  Yes    Type 2 diabetes mellitus [E11.9]  Yes    Iron deficiency anemia [D50.9]  Yes      Resolved Hospital Problems   No resolved problems to display.        Brief Hospital Course to date:  Omkar Nava is a 69 y.o. male with PMH significant for diabetes mellitus, complicated by blindness, osteomyelitis, CKD, early onset dementia with psychosis, decreased mobility, and heart failure with preserved EF. He presents from [facility] for decreased mental status and fever.     Recent admission 3/10-4/4/24 due to hypoxic respiratory failure requiring intubation in setting of recurrent aspiration and multifocal pneumonia; respiratory culture grew ESBL Klebsiella at that time. Had PEG tube placed at that time.     This patient's problems and plans were partially entered by my partner and updated as appropriate by me 09/16/24. Copied  text in this note has been reviewed and is accurate as of today's date.     Acute hypoxia  Sepsis secondary to multifocal pneumonia  History of ESBL Klebsiella  COVID-19 positive  --Presented tachypneic, febrile, and oxygen requirement of 3 L - patient reports he is on 2 liters at [facility] but does not wear all the time  --COVID-19 test positive day of admission. Multifocal pneumonia on CT scan. Chronic aspiration with PEG tube in place. Suspect findings related to chronic aspiration and acute COVID, however, note history of severe respiratory failure and ESBL Klebsiella in previous sputum culture.  --ID/Dr. Gordon following, states that likely aspiration PNA + possible secondary bacterial infection due to COVID. States that slightly increased risk of ESBL but more distant. Currently on Linezolid, Cefepime and Doxycycline -- could deescalate to PO Linezolid and Cefuroxime through 9/19/14 to facilitate discharge  --Completed 5 days of Remdesivir 9/16/24, continue Decadron through 9/21/24 for 10 days total  --Blood cultures NGTD, negative urinary antigens, positive MRSA PCR  --Aggressive bronchopulmonary hygiene, IS, OPEP, suctioning  --Weaned from 4 liters to 2 liters  --Monitor for further fevers, last was 101 on 9/13/24 AM     Acute on chronic anemia  --Baseline Hb near 8, Hb was 6.9 on presentation  --No signs of bleeding at this time. MCV is elevated. Fecal occult in ED is negative. Suspect related to acute on chronic illness.  --B12 and folate okay. Iron studies c/w anemia of chronic disease.  --s/p 1 unit PRBC on 9/11/24, monitor closely     Hyperkalemia  --K 6.4 on admission, received treatment in ED  --Improved, monitor     JAYY on CKD  --Cr 1.62 on admission but has fluctuated significantly over the last year. Last value 0.92 ~5 months ago. Will monitor output, hold nephrotoxic meds.  --Better with IVF's     Chronic aspiration  --PEG tube in place (placed during recent admission 4/1/24 by Dr. Brunner),  was discharged with strict NPO status but since then has been able to advance back to PO diet at [facility] per family  --SLP has seen and performed MBS 9/13/24: soft to chew, ground, thin liquids  --Tube feeds per Nutrition  --Poor oral intake in general     HFpEF  HTN  --Appears compensated at this time   --Continue Coreg, Torsemide, Clonidine, Terazosin, Hydralazine      T2DM with hypoglycemia  --HbA1c 7% October 2022 - repeat here 5.6%  --Resolved, monitor  --SSI, adjust as needed     Dementia with agitation  Seizure disorder?  --Given decreased responsiveness leading to initial presentation we have been holding most of sedating meds for now.  Placed on Depakote for agitation per Neurology in past? - now resumed here  --Resumed Seroquel, Abilify       Expected Discharge Location and Transportation: back to [facility]  Expected Discharge if confusion a little better can plan to discharge tomorrow   Expected Discharge Date: 9/17/2024; Expected Discharge Time:      VTE Prophylaxis:  Mechanical VTE prophylaxis orders are present.         AM-PAC 6 Clicks Score (PT): 9 (09/15/24 2000)    CODE STATUS:   Code Status and Medical Interventions: CPR (Attempt to Resuscitate); Full Support; Cannot reach family, deferring to prior   Ordered at: 09/12/24 0003     Code Status (Patient has no pulse and is not breathing):    CPR (Attempt to Resuscitate)     Medical Interventions (Patient has pulse or is breathing):    Full Support     Comments:    Cannot reach family, deferring to prior       Ludmila Parish MD  09/16/24

## 2024-09-16 NOTE — CASE MANAGEMENT/SOCIAL WORK
Continued Stay Note  [facility]     Patient Name: Omkar Nava  MRN: [id number]  Today's Date: 9/16/2024    Admit Date: 9/11/2024    Plan: return to [facility]   Discharge Plan       Row Name 09/16/24 1135       Plan    Plan Comments CM spoke with Mariela with [facility] who reports they can accept pt back to a skilled bed once medically ready. CM will continue to follow and will make transportation arrangments as needed.    Final Discharge Disposition Code 03 - skilled nursing facility (SNF)                   Discharge Codes    No documentation.                 Expected Discharge Date and Time       Expected Discharge Date Expected Discharge Time    Sep 16, 2024               Chely Almonte RN

## 2024-09-16 NOTE — PLAN OF CARE
Problem: Adult Inpatient Plan of Care  Goal: Absence of Hospital-Acquired Illness or Injury  Intervention: Identify and Manage Fall Risk  Recent Flowsheet Documentation  Taken 9/16/2024 0243 by Vilma Batres RN  Safety Promotion/Fall Prevention:   activity supervised   assistive device/personal items within reach   clutter free environment maintained   lighting adjusted   nonskid shoes/slippers when out of bed   room organization consistent   safety round/check completed  Taken 9/16/2024 0056 by Vilma Batres RN  Safety Promotion/Fall Prevention:   activity supervised   assistive device/personal items within reach   clutter free environment maintained   lighting adjusted   nonskid shoes/slippers when out of bed   room organization consistent   safety round/check completed  Taken 9/15/2024 2200 by Vilma Batres RN  Safety Promotion/Fall Prevention:   activity supervised   assistive device/personal items within reach   clutter free environment maintained   lighting adjusted   nonskid shoes/slippers when out of bed   room organization consistent   safety round/check completed  Taken 9/15/2024 2000 by Vilma Batres RN  Safety Promotion/Fall Prevention:   activity supervised   assistive device/personal items within reach   clutter free environment maintained   lighting adjusted   nonskid shoes/slippers when out of bed   room organization consistent   safety round/check completed  Intervention: Prevent Skin Injury  Recent Flowsheet Documentation  Taken 9/16/2024 0243 by Vilma Batres RN  Body Position:   tilted   left  Skin Protection:   adhesive use limited   incontinence pads utilized   tubing/devices free from skin contact   skin-to-skin areas padded   skin-to-device areas padded   skin sealant/moisture barrier applied   silicone foam dressing in place  Taken 9/16/2024 0056 by Vilma Batres, RN  Body Position:   tilted   right  Skin Protection:   adhesive use limited   incontinence pads utilized   tubing/devices free  from skin contact   silicone foam dressing in place   skin sealant/moisture barrier applied   skin-to-device areas padded   skin-to-skin areas padded  Taken 9/15/2024 2200 by Vilma Batres RN  Body Position:   tilted   left  Skin Protection:   adhesive use limited   incontinence pads utilized   tubing/devices free from skin contact   skin-to-skin areas padded   skin-to-device areas padded   skin sealant/moisture barrier applied   silicone foam dressing in place   pulse oximeter probe site changed  Taken 9/15/2024 2000 by Vilma Batres RN  Body Position:   tilted   right  Skin Protection:   adhesive use limited   incontinence pads utilized   tubing/devices free from skin contact   skin-to-skin areas padded   skin-to-device areas padded   skin sealant/moisture barrier applied   silicone foam dressing in place   pulse oximeter probe site changed  Intervention: Prevent and Manage VTE (Venous Thromboembolism) Risk  Recent Flowsheet Documentation  Taken 9/16/2024 0243 by Vilma Batres RN  VTE Prevention/Management:   bilateral   sequential compression devices on  Taken 9/16/2024 0056 by Vilma Batres RN  VTE Prevention/Management:   bilateral   sequential compression devices on  Taken 9/15/2024 2200 by Vilma Batres RN  VTE Prevention/Management:   bilateral   sequential compression devices on  Taken 9/15/2024 2000 by Vilma Batres RN  Activity Management:   dorsiflexion/plantar flexion performed   activity encouraged  VTE Prevention/Management:   bilateral   sequential compression devices on  Range of Motion: active ROM (range of motion) encouraged  Intervention: Prevent Infection  Recent Flowsheet Documentation  Taken 9/16/2024 0243 by Vilma Batres RN  Infection Prevention:   environmental surveillance performed   hand hygiene promoted   rest/sleep promoted   single patient room provided  Taken 9/16/2024 0056 by Vilma Batres RN  Infection Prevention:   environmental surveillance performed   hand hygiene promoted    rest/sleep promoted   single patient room provided  Taken 9/15/2024 2200 by Vilma Batres, RN  Infection Prevention:   environmental surveillance performed   hand hygiene promoted   rest/sleep promoted   single patient room provided  Taken 9/15/2024 2000 by Vilma Batres RN  Infection Prevention:   environmental surveillance performed   hand hygiene promoted   rest/sleep promoted   single patient room provided  Goal: Optimal Comfort and Wellbeing  Intervention: Monitor Pain and Promote Comfort  Recent Flowsheet Documentation  Taken 9/15/2024 2000 by Vilma Batres RN  Pain Management Interventions:   pillow support provided   position adjusted  Intervention: Provide Person-Centered Care  Recent Flowsheet Documentation  Taken 9/15/2024 2000 by Vilma Batres, RN  Trust Relationship/Rapport:   care explained   choices provided   emotional support provided   questions answered   empathic listening provided   questions encouraged   reassurance provided   thoughts/feelings acknowledged   Goal Outcome Evaluation:

## 2024-09-16 NOTE — PROGRESS NOTES
[place] [facility]    INFECTIOUS DISEASE PROGRESS NOTE    Omkar Nava  1955  [id number]    Date of consult: 9/12/2024    Admit date: 9/11/2024    Requesting Provider: No ref. provider found  Evaluating physician: Rayray Gordon MD  Reason for Consultation: Pneumonia, COVID-19  Chief Complaint: Above      Subjective   History of present illness:  Patient is a  69 y.o.  Yr old male with a history of dementia, diabetes mellitus type 2, blindness, essential hypertension, substance use in the past, nursing home resident at [facility], who was admitted to [facility] on 9/11/2024 with decreased mental status and acute hypoxic respiratory failure.  Radiographs revealed multifocal pneumonia consistent with possible aspiration and patient also tested positive for COVID-19.  Patient is a poor historian.  I was consulted on 9/12/2024 for further evaluation and treatment.    9/13/2024 history reviewed.  Remains confused.  Poor historian.  Tolerating antibiotics for COVID-19 and pneumonia.  No high fever.  Continues cefepime,, doxycycline, remdesivir, and adding oral linezolid given MRSA positive screen.    9/16/2024 history reviewed.  No high fevers or chills.  Confused off and on.  Tolerating antibiotics with cefepime, doxycycline, linezolid, remdesivir.  MRSA screen positive.  Not producing sputum.  COVID-19 +9/11.    Past Medical History:   Diagnosis Date    Anemia     Dementia     Diabetes mellitus     Dysphagia     GERD (gastroesophageal reflux disease)     History of alcohol abuse     History of cocaine use     History of marijuana use     Hypertension     Osteomyelitis     Poor historian     records obtained from nursing home records & his family    Visual impairment        Past Surgical History:   Procedure Laterality Date    AMPUTATION FOOT Left 10/18/2022    Procedure: PARTIAL FIRST RAY AMPUTATION LEFT;  Surgeon: Yeison Petty MD;  Location: [facility];  Service:  Orthopedics;  Laterality: Left;    AMPUTATION FOOT Left 12/5/2022    Procedure: Transmetatarsal of Left Foot;  Surgeon: Yeison Petty MD;  Location:  [facility];  Service: Orthopedics;  Laterality: Left;    ENDOSCOPY N/A 3/14/2024    Procedure: ESOPHAGOGASTRODUODENOSCOPY WITH JEJUNAL TUBE INSERTION AT BEDSIDE;  Surgeon: Brunner, Mark I, MD;  Location:  [facility];  Service: Gastroenterology;  Laterality: N/A;    ENDOSCOPY W/ PEG TUBE PLACEMENT N/A 4/1/2024    Procedure: ESOPHAGOGASTRODUODENOSCOPY WITH PERCUTANEOUS ENDOSCOPIC GASTROSTOMY TUBE INSERTION;  Surgeon: Brunner, Mark I, MD;  Location:  [facility];  Service: Gastroenterology;  Laterality: N/A;  EGD with PEG placement.  Secured at 3.5 cm    EYE SURGERY         Pediatric History   Patient Parents    Not on file     Other Topics Concern    Not on file   Social History Narrative    Caffeine 0-1 servings per day    Patient lives at home .   Previous history of substance use including cocaine and alcohol, not currently, and no current history for cigarettes, nursing home resident    family history includes Diabetes in his brother, brother, father, maternal grandfather, maternal grandmother, mother, and sister; Hypertension in his brother, brother, father, and mother.    No Known Allergies    Immunization History   Administered Date(s) Administered    COVID-19 (MODERNA) 1st,2nd,3rd Dose Monovalent 06/09/2021, 07/19/2021    COVID-19 (MODERNA) Monovalent Original Booster 04/11/2022    COVID-19 F23 (MODERNA) 12YRS+ (SPIKEVAX) 10/16/2023    Fluzone (or Fluarix & Flulaval for VFC) >6mos 11/29/2023       Medication:  @Scheduled Meds:amLODIPine, 10 mg, Per G Tube, Q24H  ARIPiprazole, 5 mg, Per G Tube, Daily  brimonidine, 1 drop, Both Eyes, TID  carvedilol, 12.5 mg, Per G Tube, Q12H  cefepime, 2,000 mg, Intravenous, Q8H  cloNIDine, 0.2 mg, Per G Tube, Q8H  dexAMETHasone, 6 mg, Per G Tube, Daily   Or  dexAMETHasone, 6 mg, Intravenous, Daily  doxycycline, 100 mg,  "Per G Tube, Q12H  hydrALAZINE, 100 mg, Per G Tube, Q8H  insulin lispro, 2-7 Units, Subcutaneous, 4x Daily AC & at Bedtime  ipratropium-albuterol, 3 mL, Nebulization, Q6H - RT  lansoprazole, 30 mg, Per G Tube, Q AM  linezolid, 600 mg, Per PEG Tube, BID  palliative care oral rinse, 5 mL, Mouth/Throat, 4x Daily  QUEtiapine, 25 mg, Per G Tube, Nightly  sodium chloride, 10 mL, Intravenous, Q12H  terazosin, 5 mg, Per G Tube, Q12H  timolol, 1 drop, Both Eyes, BID  torsemide, 5 mg, Per G Tube, Daily  Valproic Acid, 150 mg, Per G Tube, Q8H      Continuous Infusions:Pharmacy Consult - Remdesivir,       PRN Meds:.  acetaminophen    senna-docusate sodium **AND** polyethylene glycol **AND** [DISCONTINUED] bisacodyl **AND** bisacodyl    Calcium Replacement - Follow Nurse / BPA Driven Protocol    dextrose    glucagon (human recombinant)    hydrOXYzine    Magnesium Standard Dose Replacement - Follow Nurse / BPA Driven Protocol    Pharmacy Consult - Remdesivir    Phosphorus Replacement - Follow Nurse / BPA Driven Protocol    Potassium Replacement - Follow Nurse / BPA Driven Protocol    sodium chloride    sodium chloride     Please refer to the medical record for a full medication list    Review of Systems: Difficult to obtain secondary to mental status      Physical Exam:   Vital Signs   Temp:  [97.3 °F (36.3 °C)-98.7 °F (37.1 °C)] 98.5 °F (36.9 °C)  Heart Rate:  [53-64] 60  Resp:  [18] 18  BP: (170-188)/(68-96) 180/75    Blood pressure 180/75, pulse 60, temperature 98.5 °F (36.9 °C), temperature source Axillary, resp. rate 18, height 182.9 cm (72\"), weight 84.2 kg (185 lb 11.2 oz), SpO2 99%.  GENERAL: Awake and alert, in mild distress. Appears older than stated age.  Resting in bed.  Confused.  HEENT:  Normocephalic, atraumatic.  Oropharynx without thrush. Dentition in good repair. No cervical adenopathy. No neck masses.  Ears externally normal, Nose externally normal.  EYES: No conjunctival injection. No icterus. EOM " full.  LYMPHATICS: No lymphadenopathy of the neck or axillary or inguinal regions.   HEART: No murmur, gallop, or pericardial friction rub. Reg rate rhythm.  LUNGS: Bilateral rales. No respiratory distress, no use of accessory muscles.  ABDOMEN: Soft, nontender, nondistended. No appreciable HSM.  Bowel sounds normal.  SKIN: Warm and dry without cutaneous eruptions.  No nodules.    PSYCHIATRIC: Mental status with confusion.  EXT:  No cellulitic change.  NEURO: Oriented to name, nonfocal except blindness.      Results Review:   I reviewed the patient's new clinical results.  I reviewed the patient's new imaging results and agree with the interpretation.  I reviewed the patient's other test results and agree with the interpretation    Results from last 7 days   Lab Units 09/15/24  0657 09/14/24  0543 09/13/24  0746   WBC 10*3/mm3 7.78 7.83 7.80   HEMOGLOBIN g/dL 8.5* 8.4* 9.2*   HEMATOCRIT % 25.6* 25.6* 28.9*   PLATELETS 10*3/mm3 164 170 184     Results from last 7 days   Lab Units 09/15/24  0657   SODIUM mmol/L 136   POTASSIUM mmol/L 4.3   CHLORIDE mmol/L 104   CO2 mmol/L 23.0   BUN mg/dL 37*   CREATININE mg/dL 1.14   GLUCOSE mg/dL 196*   CALCIUM mg/dL 8.9     Results from last 7 days   Lab Units 09/15/24  0657   ALK PHOS U/L 93   BILIRUBIN mg/dL 0.2   ALT (SGPT) U/L 47*   AST (SGOT) U/L 20     Results from last 7 days   Lab Units 09/11/24  1816   SED RATE mm/hr 27*     Results from last 7 days   Lab Units 09/14/24  0543   CRP mg/dL 4.89*         Results from last 7 days   Lab Units 09/11/24  1816   LACTATE mmol/L 0.5     Estimated Creatinine Clearance: 74.9 mL/min (by C-G formula based on SCr of 1.14 mg/dL).  CPK          9/13/2024    07:46   Common Labs   Creatine Kinase 308       Procalitonin Results:      Lab 09/11/24  1816   PROCALCITONIN 0.11      Brief Urine Lab Results  (Last result in the past 365 days)        Color   Clarity   Blood   Leuk Est   Nitrite   Protein   CREAT   Urine HCG        09/11/24 1905  "Yellow   Clear   Negative   Trace   Negative   Trace                  No results found for: \"SITE\", \"ALLENTEST\", \"PHART\", \"ADO5XPO\", \"PO2ART\", \"WKY8XAQ\", \"BASEEXCESS\", \"P9ZVTICY\", \"HGBBG\", \"HCTABG\", \"OXYHEMOGLOBI\", \"METHHGBN\", \"CARBOXYHGB\", \"CO2CT\", \"BAROMETRIC\", \"MODALITY\", \"FIO2\"       Microbiology:      Results for orders placed or performed during the hospital encounter of 09/11/24   Blood Culture - Blood, Arm, Left    Specimen: Arm, Left; Blood   Result Value Ref Range    Blood Culture No growth at 4 days               Brief Urine Lab Results  (Last result in the past 365 days)        Color   Clarity   Blood   Leuk Est   Nitrite   Protein   CREAT   Urine HCG        09/11/24 1905 Yellow   Clear   Negative   Trace   Negative   Trace                     Radiology:  Imaging Results (Last 72 Hours)       Procedure Component Value Units Date/Time    FL Video Swallow With Speech Single Contrast [[id number]] Collected: 09/13/24 1546     Updated: 09/13/24 1600    Narrative:      FL VIDEO SWALLOW W SPEECH SINGLE-CONTRAST    Date of Exam: 9/13/2024 3:30 PM EDT    Indication: dysphagia.       Comparison: None available.    Technique:   The speech pathologist administered food and/or liquid mixed with barium to the patient with cine/video imaging.  Imaging assistance was provided to the speech pathologist and an image was saved.    Fluoroscopic Time: 1 minute and 24 seconds    Number of Images: 6 associated fluoroscopic loops were saved    Findings:  No aspiration was seen during fluoroscopic guided modified barium swallowing series. Please see speech therapy report for full details and recommendations.      Impression:      Impression:  Fluoroscopy provided for a modified barium swallow. No aspiration was seen during swallowing evaluation. Please see speech therapy report for full details and recommendations.      Report dictated by: Maggi Silverman PA-c      I have personally reviewed this case and agree with the findings " above:    Electronically Signed: Anjum Prado MD    9/13/2024 3:57 PM EDT    Workstation ID: [id number]            IMPRESSION:     Pneumonia, likely aspiration plus possible secondary bacterial infection from COVID-19.  COVID-19 tested + 9/11/2024.  Organisms to consider for secondary bacterial infection include gram-negative rods, Streptococcus, Staphylococcus.  Does have a slightly increased risk for infection with ESBL organism but this was more distant.  Less likely atypical Legionella.  MRSA swab positive from 9/12.  Legionella and Streptococcus negative.  COVID-19 infection + 9/11/2024.  In isolation until 9/21 and reassess.  Encephalopathy, toxic and metabolic related to above issues.  Acute hypoxic respiratory failure, was on 3 L/min nasal cannula on 9/12.  2 L/min on 9/16.  Dementia, affecting all aspects of care.  Diabetes mellitus type 2 with increased risk for infection.  Blindness, affecting all aspects of care.  History of substance use with cocaine and alcohol in the past.  Anemia, chronic disease and related to above issues.  Elevated ALT 78 on 9/12.  Related to above issues and medications.  Fever related to above issues.  Urinalysis benign from 9/11.  Resolved.    PLAN:    Diagnostically, continue to follow patient's physical exam, CBC, CMP, CRP, cultures which have been obtained, sooner if available.  Radiographic studies.  Therapeutically, continue cefepime, linezolid (9/13-) given the MRSA positive, (trying to avoid vancomycin with renal failure) and doxycycline pending further culture data.  Discontinue eravacycline.  Cefepime can be associated with altered mental status in patients with renal failure and will need to be observed closely.  Duration of antibiotics to be determined.  Completed 5 days of remdesivir on 9/16.  Continue Decadron for 5 days depending upon clinical course.  Watch for drug interaction between brimonidine and linezolid.  Linezolid and cefepime should continue until 9/19.   Could de-escalate to linezolid and cefuroxime until 9/19 to facilitate discharge.  Supportive care.  3 L/min nasal cannula oxygen on 9/12/2024.  4 L nasal cannula 9/13.    I discussed the patient's findings and my recommendations with patient and nursing staff    Thank you for asking me to see Omkar Nava.  Our group would be pleased to follow this patient over the course of their hospitalization and assist with outpatient antimicrobial therapy, as indicated.  Further recommendations depend on the results of the cultures and clinical course.  Side effects of medications discussed.  The patient is at increased risk for adverse drug reactions, complications of IV access, and readmission.      Rayray Gordon MD  9/16/2024

## 2024-09-17 LAB
ALBUMIN SERPL-MCNC: 3.1 G/DL (ref 3.5–5.2)
ALBUMIN/GLOB SERPL: 0.9 G/DL
ALP SERPL-CCNC: 101 U/L (ref 39–117)
ALT SERPL W P-5'-P-CCNC: 38 U/L (ref 1–41)
ANION GAP SERPL CALCULATED.3IONS-SCNC: 8 MMOL/L (ref 5–15)
AST SERPL-CCNC: 21 U/L (ref 1–40)
BILIRUB SERPL-MCNC: <0.2 MG/DL (ref 0–1.2)
BUN SERPL-MCNC: 44 MG/DL (ref 8–23)
BUN/CREAT SERPL: 34.9 (ref 7–25)
CALCIUM SPEC-SCNC: 8.7 MG/DL (ref 8.6–10.5)
CHLORIDE SERPL-SCNC: 106 MMOL/L (ref 98–107)
CO2 SERPL-SCNC: 23 MMOL/L (ref 22–29)
CREAT SERPL-MCNC: 1.26 MG/DL (ref 0.76–1.27)
EGFRCR SERPLBLD CKD-EPI 2021: 62.5 ML/MIN/1.73
GLOBULIN UR ELPH-MCNC: 3.3 GM/DL
GLUCOSE BLDC GLUCOMTR-MCNC: 240 MG/DL (ref 70–130)
GLUCOSE BLDC GLUCOMTR-MCNC: 275 MG/DL (ref 70–130)
GLUCOSE BLDC GLUCOMTR-MCNC: 307 MG/DL (ref 70–130)
GLUCOSE BLDC GLUCOMTR-MCNC: 321 MG/DL (ref 70–130)
GLUCOSE SERPL-MCNC: 280 MG/DL (ref 65–99)
POTASSIUM SERPL-SCNC: 4.4 MMOL/L (ref 3.5–5.2)
PROT SERPL-MCNC: 6.4 G/DL (ref 6–8.5)
SODIUM SERPL-SCNC: 137 MMOL/L (ref 136–145)

## 2024-09-17 PROCEDURE — 94799 UNLISTED PULMONARY SVC/PX: CPT

## 2024-09-17 PROCEDURE — 80053 COMPREHEN METABOLIC PANEL: CPT | Performed by: STUDENT IN AN ORGANIZED HEALTH CARE EDUCATION/TRAINING PROGRAM

## 2024-09-17 PROCEDURE — 63710000001 DEXAMETHASONE PER 0.25 MG: Performed by: STUDENT IN AN ORGANIZED HEALTH CARE EDUCATION/TRAINING PROGRAM

## 2024-09-17 PROCEDURE — 63710000001 INSULIN LISPRO (HUMAN) PER 5 UNITS: Performed by: HOSPITALIST

## 2024-09-17 PROCEDURE — 25010000002 CEFEPIME PER 500 MG

## 2024-09-17 PROCEDURE — 94761 N-INVAS EAR/PLS OXIMETRY MLT: CPT

## 2024-09-17 PROCEDURE — 92526 ORAL FUNCTION THERAPY: CPT

## 2024-09-17 PROCEDURE — 82948 REAGENT STRIP/BLOOD GLUCOSE: CPT

## 2024-09-17 PROCEDURE — 94664 DEMO&/EVAL PT USE INHALER: CPT

## 2024-09-17 PROCEDURE — 99239 HOSP IP/OBS DSCHRG MGMT >30: CPT | Performed by: NURSE PRACTITIONER

## 2024-09-17 RX ORDER — LINEZOLID 600 MG/1
600 TABLET, FILM COATED ORAL 2 TIMES DAILY
Start: 2024-09-17 | End: 2024-09-20

## 2024-09-17 RX ORDER — DEXAMETHASONE 6 MG/1
6 TABLET ORAL DAILY
Start: 2024-09-18 | End: 2024-09-22

## 2024-09-17 RX ORDER — FLUOXETINE 20 MG/5ML
20 SOLUTION ORAL DAILY
Start: 2024-09-17

## 2024-09-17 RX ORDER — DOXYCYCLINE 100 MG/1
100 CAPSULE ORAL EVERY 12 HOURS
Start: 2024-09-17 | End: 2024-09-18 | Stop reason: HOSPADM

## 2024-09-17 RX ORDER — CEFUROXIME AXETIL 500 MG/1
500 TABLET ORAL 2 TIMES DAILY
Start: 2024-09-17 | End: 2024-09-19

## 2024-09-17 RX ADMIN — CLONIDINE HYDROCHLORIDE 0.2 MG: 0.2 TABLET ORAL at 21:44

## 2024-09-17 RX ADMIN — HYDRALAZINE HYDROCHLORIDE 100 MG: 50 TABLET ORAL at 13:43

## 2024-09-17 RX ADMIN — CARVEDILOL 12.5 MG: 12.5 TABLET, FILM COATED ORAL at 08:30

## 2024-09-17 RX ADMIN — VALPROIC ACID 150 MG: 250 SOLUTION ORAL at 13:43

## 2024-09-17 RX ADMIN — HYDRALAZINE HYDROCHLORIDE 100 MG: 50 TABLET ORAL at 21:44

## 2024-09-17 RX ADMIN — INSULIN LISPRO 3 UNITS: 100 INJECTION, SOLUTION INTRAVENOUS; SUBCUTANEOUS at 13:43

## 2024-09-17 RX ADMIN — CEFEPIME 2000 MG: 2 INJECTION, POWDER, FOR SOLUTION INTRAVENOUS at 15:42

## 2024-09-17 RX ADMIN — BRIMONIDINE TARTRATE 1 DROP: 2 SOLUTION/ DROPS OPHTHALMIC at 15:42

## 2024-09-17 RX ADMIN — TERAZOSIN HYDROCHLORIDE 5 MG: 5 CAPSULE ORAL at 08:30

## 2024-09-17 RX ADMIN — DOXYCYCLINE 100 MG: 100 CAPSULE ORAL at 00:54

## 2024-09-17 RX ADMIN — MINERAL OIL 5 ML: 1000 LIQUID ORAL at 13:43

## 2024-09-17 RX ADMIN — INSULIN LISPRO 5 UNITS: 100 INJECTION, SOLUTION INTRAVENOUS; SUBCUTANEOUS at 08:29

## 2024-09-17 RX ADMIN — LINEZOLID 600 MG: 600 TABLET, FILM COATED ORAL at 21:44

## 2024-09-17 RX ADMIN — VALPROIC ACID 150 MG: 250 SOLUTION ORAL at 21:43

## 2024-09-17 RX ADMIN — IPRATROPIUM BROMIDE AND ALBUTEROL SULFATE 3 ML: 2.5; .5 SOLUTION RESPIRATORY (INHALATION) at 19:14

## 2024-09-17 RX ADMIN — IPRATROPIUM BROMIDE AND ALBUTEROL SULFATE 3 ML: 2.5; .5 SOLUTION RESPIRATORY (INHALATION) at 13:19

## 2024-09-17 RX ADMIN — QUETIAPINE FUMARATE 25 MG: 25 TABLET ORAL at 21:44

## 2024-09-17 RX ADMIN — AMLODIPINE BESYLATE 10 MG: 10 TABLET ORAL at 08:30

## 2024-09-17 RX ADMIN — VALPROIC ACID 150 MG: 250 SOLUTION ORAL at 06:08

## 2024-09-17 RX ADMIN — BISACODYL 10 MG: 10 SUPPOSITORY RECTAL at 10:44

## 2024-09-17 RX ADMIN — MINERAL OIL 5 ML: 1000 LIQUID ORAL at 08:34

## 2024-09-17 RX ADMIN — ARIPIPRAZOLE 5 MG: 10 TABLET ORAL at 08:29

## 2024-09-17 RX ADMIN — BRIMONIDINE TARTRATE 1 DROP: 2 SOLUTION/ DROPS OPHTHALMIC at 08:34

## 2024-09-17 RX ADMIN — BISACODYL 10 MG: 10 SUPPOSITORY RECTAL at 11:20

## 2024-09-17 RX ADMIN — TORSEMIDE 5 MG: 10 TABLET ORAL at 08:29

## 2024-09-17 RX ADMIN — CEFEPIME 2000 MG: 2 INJECTION, POWDER, FOR SOLUTION INTRAVENOUS at 21:43

## 2024-09-17 RX ADMIN — DEXAMETHASONE 6 MG: 4 TABLET ORAL at 08:29

## 2024-09-17 RX ADMIN — INSULIN LISPRO 4 UNITS: 100 INJECTION, SOLUTION INTRAVENOUS; SUBCUTANEOUS at 21:52

## 2024-09-17 RX ADMIN — CEFEPIME 2000 MG: 2 INJECTION, POWDER, FOR SOLUTION INTRAVENOUS at 06:09

## 2024-09-17 RX ADMIN — HYDRALAZINE HYDROCHLORIDE 100 MG: 50 TABLET ORAL at 06:08

## 2024-09-17 RX ADMIN — IPRATROPIUM BROMIDE AND ALBUTEROL SULFATE 3 ML: 2.5; .5 SOLUTION RESPIRATORY (INHALATION) at 06:55

## 2024-09-17 RX ADMIN — BRIMONIDINE TARTRATE 1 DROP: 2 SOLUTION/ DROPS OPHTHALMIC at 21:44

## 2024-09-17 RX ADMIN — CARVEDILOL 12.5 MG: 12.5 TABLET, FILM COATED ORAL at 21:44

## 2024-09-17 RX ADMIN — DOXYCYCLINE 100 MG: 100 CAPSULE ORAL at 13:43

## 2024-09-17 RX ADMIN — IPRATROPIUM BROMIDE AND ALBUTEROL SULFATE 3 ML: 2.5; .5 SOLUTION RESPIRATORY (INHALATION) at 00:46

## 2024-09-17 RX ADMIN — CLONIDINE HYDROCHLORIDE 0.2 MG: 0.2 TABLET ORAL at 13:43

## 2024-09-17 RX ADMIN — TIMOLOL MALEATE 1 DROP: 5 SOLUTION/ DROPS OPHTHALMIC at 08:34

## 2024-09-17 RX ADMIN — INSULIN LISPRO 5 UNITS: 100 INJECTION, SOLUTION INTRAVENOUS; SUBCUTANEOUS at 16:46

## 2024-09-17 RX ADMIN — Medication 10 ML: at 08:37

## 2024-09-17 RX ADMIN — LINEZOLID 600 MG: 600 TABLET, FILM COATED ORAL at 08:29

## 2024-09-17 RX ADMIN — CLONIDINE HYDROCHLORIDE 0.2 MG: 0.2 TABLET ORAL at 06:08

## 2024-09-17 RX ADMIN — TERAZOSIN HYDROCHLORIDE 5 MG: 5 CAPSULE ORAL at 21:44

## 2024-09-17 RX ADMIN — TIMOLOL MALEATE 1 DROP: 5 SOLUTION/ DROPS OPHTHALMIC at 21:44

## 2024-09-17 RX ADMIN — LANSOPRAZOLE 30 MG: 15 TABLET, ORALLY DISINTEGRATING ORAL at 06:08

## 2024-09-17 RX ADMIN — MINERAL OIL 5 ML: 1000 LIQUID ORAL at 16:46

## 2024-09-17 NOTE — PROGRESS NOTES
[facility]    INFECTIOUS DISEASE PROGRESS NOTE    Omkar Nava  1955  [id number]    Date of consult: 9/12/2024    Admit date: 9/11/2024    Requesting Provider: No ref. provider found  Evaluating physician: Rayray Gordon MD  Reason for Consultation: Pneumonia, COVID-19  Chief Complaint: Above      Subjective   History of present illness:  Patient is a  69 y.o.  Yr old male with a history of dementia, diabetes mellitus type 2, blindness, essential hypertension, substance use in the past, nursing home resident at [facility], who was admitted to [facility] on 9/11/2024 with decreased mental status and acute hypoxic respiratory failure.  Radiographs revealed multifocal pneumonia consistent with possible aspiration and patient also tested positive for COVID-19.  Patient is a poor historian.  I was consulted on 9/12/2024 for further evaluation and treatment.    9/13/2024 history reviewed.  Remains confused.  Poor historian.  Tolerating antibiotics for COVID-19 and pneumonia.  No high fever.  Continues cefepime,, doxycycline, remdesivir, and adding oral linezolid given MRSA positive screen.    9/16/2024 history reviewed.  No high fevers or chills.  Confused off and on.  Tolerating antibiotics with cefepime, doxycycline, linezolid, remdesivir.  MRSA screen positive.  Not producing sputum.  COVID-19 +9/11.    9/17/2024 history reviewed.  Continues on antibiotics for bronchitis and pneumonia.  No high fever.  Poor historian.    Past Medical History:   Diagnosis Date    Anemia     Dementia     Diabetes mellitus     Dysphagia     GERD (gastroesophageal reflux disease)     History of alcohol abuse     History of cocaine use     History of marijuana use     Hypertension     Osteomyelitis     Poor historian     records obtained from nursing home records & his family    Visual impairment        Past Surgical History:   Procedure Laterality Date    AMPUTATION FOOT Left  10/18/2022    Procedure: PARTIAL FIRST RAY AMPUTATION LEFT;  Surgeon: Yeison Petty MD;  Location:  [facility];  Service: Orthopedics;  Laterality: Left;    AMPUTATION FOOT Left 12/5/2022    Procedure: Transmetatarsal of Left Foot;  Surgeon: Yeison Petty MD;  Location:  [facility];  Service: Orthopedics;  Laterality: Left;    ENDOSCOPY N/A 3/14/2024    Procedure: ESOPHAGOGASTRODUODENOSCOPY WITH JEJUNAL TUBE INSERTION AT BEDSIDE;  Surgeon: Brunner, Mark I, MD;  Location:  [facility];  Service: Gastroenterology;  Laterality: N/A;    ENDOSCOPY W/ PEG TUBE PLACEMENT N/A 4/1/2024    Procedure: ESOPHAGOGASTRODUODENOSCOPY WITH PERCUTANEOUS ENDOSCOPIC GASTROSTOMY TUBE INSERTION;  Surgeon: Brunner, Mark I, MD;  Location:  [facility];  Service: Gastroenterology;  Laterality: N/A;  EGD with PEG placement.  Secured at 3.5 cm    EYE SURGERY         Pediatric History   Patient Parents    Not on file     Other Topics Concern    Not on file   Social History Narrative    Caffeine 0-1 servings per day    Patient lives at home .   Previous history of substance use including cocaine and alcohol, not currently, and no current history for cigarettes, nursing home resident    family history includes Diabetes in his brother, brother, father, maternal grandfather, maternal grandmother, mother, and sister; Hypertension in his brother, brother, father, and mother.    No Known Allergies    Immunization History   Administered Date(s) Administered    COVID-19 (MODERNA) 1st,2nd,3rd Dose Monovalent 06/09/2021, 07/19/2021    COVID-19 (MODERNA) Monovalent Original Booster 04/11/2022    COVID-19 F23 (MODERNA) 12YRS+ (SPIKEVAX) 10/16/2023    Fluzone (or Fluarix & Flulaval for VFC) >6mos 11/29/2023       Medication:  @Scheduled Meds:amLODIPine, 10 mg, Per G Tube, Q24H  ARIPiprazole, 5 mg, Per G Tube, Daily  brimonidine, 1 drop, Both Eyes, TID  carvedilol, 12.5 mg, Per G Tube, Q12H  cefepime, 2,000 mg, Intravenous, Q8H  cloNIDine, 0.2 mg, Per G  "Tube, Q8H  dexAMETHasone, 6 mg, Per G Tube, Daily   Or  dexAMETHasone, 6 mg, Intravenous, Daily  doxycycline, 100 mg, Per G Tube, Q12H  hydrALAZINE, 100 mg, Per G Tube, Q8H  insulin lispro, 2-7 Units, Subcutaneous, 4x Daily AC & at Bedtime  ipratropium-albuterol, 3 mL, Nebulization, Q6H - RT  lansoprazole, 30 mg, Per G Tube, Q AM  linezolid, 600 mg, Per PEG Tube, BID  palliative care oral rinse, 5 mL, Mouth/Throat, 4x Daily  QUEtiapine, 25 mg, Per G Tube, Nightly  sodium chloride, 10 mL, Intravenous, Q12H  terazosin, 5 mg, Per G Tube, Q12H  timolol, 1 drop, Both Eyes, BID  torsemide, 5 mg, Per G Tube, Daily  Valproic Acid, 150 mg, Per G Tube, Q8H      Continuous Infusions:     PRN Meds:.  acetaminophen    senna-docusate sodium **AND** polyethylene glycol **AND** [DISCONTINUED] bisacodyl **AND** bisacodyl    Calcium Replacement - Follow Nurse / BPA Driven Protocol    dextrose    glucagon (human recombinant)    hydrOXYzine    Magnesium Standard Dose Replacement - Follow Nurse / BPA Driven Protocol    Phosphorus Replacement - Follow Nurse / BPA Driven Protocol    Potassium Replacement - Follow Nurse / BPA Driven Protocol    sodium chloride    sodium chloride     Please refer to the medical record for a full medication list    Review of Systems: Difficult to obtain secondary to mental status      Physical Exam:   Vital Signs   Temp:  [97.9 °F (36.6 °C)-99.3 °F (37.4 °C)] 98.2 °F (36.8 °C)  Heart Rate:  [58-67] 61  Resp:  [16-20] 16  BP: (160-185)/(68-77) 179/73    Blood pressure 179/73, pulse 61, temperature 98.2 °F (36.8 °C), temperature source Axillary, resp. rate 16, height 182.9 cm (72\"), weight 84.2 kg (185 lb 11.2 oz), SpO2 97%.  GENERAL: Awake and alert, in mild distress. Appears older than stated age.  Resting in bed.  Confused.  HEENT:  Normocephalic, atraumatic.  Oropharynx without thrush. Dentition in good repair. No cervical adenopathy. No neck masses.  Ears externally normal, Nose externally normal.  EYES: " No conjunctival injection. No icterus. EOM full.  LYMPHATICS: No lymphadenopathy of the neck or axillary or inguinal regions.   HEART: No murmur, gallop, or pericardial friction rub. Reg rate rhythm.  LUNGS: Bilateral rales. No respiratory distress, no use of accessory muscles.  ABDOMEN: Soft, nontender, nondistended. No appreciable HSM.  Bowel sounds normal.  SKIN: Warm and dry without cutaneous eruptions.  No nodules.    PSYCHIATRIC: Mental status with confusion.  EXT:  No cellulitic change.  Normal range of motion  NEURO: Oriented to name, nonfocal except blindness.      Results Review:   I reviewed the patient's new clinical results.  I reviewed the patient's new imaging results and agree with the interpretation.  I reviewed the patient's other test results and agree with the interpretation    Results from last 7 days   Lab Units 09/16/24  1427 09/15/24  0657 09/14/24  0543   WBC 10*3/mm3 9.53 7.78 7.83   HEMOGLOBIN g/dL 8.6* 8.5* 8.4*   HEMATOCRIT % 26.4* 25.6* 25.6*   PLATELETS 10*3/mm3 190 164 170     Results from last 7 days   Lab Units 09/16/24  1428   SODIUM mmol/L 138   POTASSIUM mmol/L 4.8   CHLORIDE mmol/L 105   CO2 mmol/L 23.0   BUN mg/dL 41*   CREATININE mg/dL 1.27   GLUCOSE mg/dL 200*   CALCIUM mg/dL 9.0     Results from last 7 days   Lab Units 09/16/24  1428   ALK PHOS U/L 102   BILIRUBIN mg/dL 0.2   ALT (SGPT) U/L 49*   AST (SGOT) U/L 28     Results from last 7 days   Lab Units 09/11/24  1816   SED RATE mm/hr 27*     Results from last 7 days   Lab Units 09/14/24  0543   CRP mg/dL 4.89*         Results from last 7 days   Lab Units 09/11/24  1816   LACTATE mmol/L 0.5     Estimated Creatinine Clearance: 67.2 mL/min (by C-G formula based on SCr of 1.27 mg/dL).  CPK          9/13/2024    07:46   Common Labs   Creatine Kinase 308       Procalitonin Results:      Lab 09/11/24  1816   PROCALCITONIN 0.11      Brief Urine Lab Results  (Last result in the past 365 days)        Color   Clarity   Blood   Leuk  "Est   Nitrite   Protein   CREAT   Urine HCG        09/11/24 1905 Yellow   Clear   Negative   Trace   Negative   Trace                  No results found for: \"SITE\", \"ALLENTEST\", \"PHART\", \"VWS2ODN\", \"PO2ART\", \"RUM9WPB\", \"BASEEXCESS\", \"V0AVWGGA\", \"HGBBG\", \"HCTABG\", \"OXYHEMOGLOBI\", \"METHHGBN\", \"CARBOXYHGB\", \"CO2CT\", \"BAROMETRIC\", \"MODALITY\", \"FIO2\"       Microbiology:      Results for orders placed or performed during the hospital encounter of 09/11/24   Blood Culture - Blood, Arm, Left    Specimen: Arm, Left; Blood   Result Value Ref Range    Blood Culture No growth at 5 days               Brief Urine Lab Results  (Last result in the past 365 days)        Color   Clarity   Blood   Leuk Est   Nitrite   Protein   CREAT   Urine HCG        09/11/24 1905 Yellow   Clear   Negative   Trace   Negative   Trace                     Radiology:  Imaging Results (Last 72 Hours)       ** No results found for the last 72 hours. **            IMPRESSION:     Pneumonia, likely aspiration plus possible secondary bacterial infection from COVID-19.  COVID-19 tested + 9/11/2024.  Organisms to consider for secondary bacterial infection include gram-negative rods, Streptococcus, Staphylococcus.  Does have a slightly increased risk for infection with ESBL organism but this was more distant.  Less likely atypical Legionella.  MRSA swab positive from 9/12.  Legionella and Streptococcus negative.  Responding.  COVID-19 infection + 9/11/2024.  In isolation until 9/21 and reassess.  Encephalopathy, toxic and metabolic related to above issues.  Continues to be an issue.  Acute hypoxic respiratory failure, was on 3 L/min nasal cannula on 9/12.  2 L/min on 9/16.  Dementia, affecting all aspects of care.  Diabetes mellitus type 2 with increased risk for infection.  Blindness, affecting all aspects of care.  History of substance use with cocaine and alcohol in the past.  Anemia, chronic disease and related to above issues.  Elevated ALT 78 on 9/12.  " Related to above issues and medications.  Fever related to above issues.  Urinalysis benign from 9/11.  Resolved.    PLAN:    Diagnostically, continue to follow patient's physical exam, CBC, CMP, CRP, cultures which have been obtained, sooner if available.  Radiographic studies As needed.  Therapeutically, continue cefepime, linezolid (9/13-) given the MRSA positive, (trying to avoid vancomycin with renal failure) and doxycycline pending further culture data.  Discontinue eravacycline.  Cefepime can be associated with altered mental status in patients with renal failure and will need to be observed closely.  Duration of antibiotics to be determined.  Completed 5 days of remdesivir on 9/16.  Continue Decadron for 5 days depending upon clinical course.  Watch for drug interaction between brimonidine and linezolid.  Linezolid and cefepime should continue until 9/19.  Could de-escalate to linezolid and cefuroxime until 9/19 to facilitate discharge.  Then follow off antibiotics.  Supportive care.  3 L/min nasal cannula oxygen on 9/12/2024.  4 L nasal cannula 9/13.    I discussed the patient's findings and my recommendations with patient and nursing staff    Thank you for asking me to see Omkar Nava.  Our group would be pleased to follow this patient over the course of their hospitalization and assist with outpatient antimicrobial therapy, as indicated.  Further recommendations depend on the results of the cultures and clinical course.  Side effects of medications discussed.  The patient is at increased risk for adverse drug reactions, complications of IV access, and readmission.      Rayray Gordon MD  9/17/2024

## 2024-09-17 NOTE — PLAN OF CARE
Goal Outcome Evaluation:  Plan of Care Reviewed With: patient        Progress: improving         Anticipated Discharge Disposition (SLP): skilled nursing facility             Treatment Assessment (SLP): continued, oral dysphagia (09/17/24 1513)  Treatment Assessment Comments (SLP): Patient tolerated trials of soft solids with reduced bolus manipulation and required additional time, however, did so with no oral residue or overt s/sx aspiration. (09/17/24 1513)  Plan for Continued Treatment (SLP): continue treatment per plan of care (09/17/24 1513)

## 2024-09-17 NOTE — CASE MANAGEMENT/SOCIAL WORK
Continued Stay Note  [facility]     Patient Name: Omkar Nava  MRN: [id number]  Today's Date: 9/17/2024    Admit Date: 9/11/2024    Plan: return to [facility]   Discharge Plan       Row Name 09/17/24 1314       Plan    Plan Comments Discharge has been postponed for today. EMS has been canceled and [facility] has been updated.                   Discharge Codes    No documentation.                 Expected Discharge Date and Time       Expected Discharge Date Expected Discharge Time    Sep 17, 2024               Chely Almonte RN

## 2024-09-17 NOTE — CASE MANAGEMENT/SOCIAL WORK
Case Management Discharge Note      Final Note: Pt will return to [facility] for skilled care today at 1130 via [facility]. PCS has been sent. Report can be called to [phone]. IMM has been given.         Selected Continued Care - Admitted Since 9/11/2024       Destination    No services have been selected for the patient.                Durable Medical Equipment    No services have been selected for the patient.                Dialysis/Infusion    No services have been selected for the patient.                Home Medical Care    No services have been selected for the patient.                Therapy    No services have been selected for the patient.                Community Resources    No services have been selected for the patient.                Community & DME    No services have been selected for the patient.                         Final Discharge Disposition Code: 03 - skilled nursing facility (SNF)

## 2024-09-17 NOTE — PROGRESS NOTES
Clinical Nutrition     Patient Name: Omkar Nava  YOB: 1955  MRN: [id number]  Date of Encounter: 09/17/24 14:39 EDT  Admission date: 9/11/2024  Reason for Visit: Follow-up protocol, EN    Assessment   Nutrition Assessment   Admission Diagnosis:  Hypoxia [R09.02]    Problem List:    Hypoxia    Type 2 diabetes mellitus    Essential hypertension    Neurocognitive disorder    Anemia of chronic disease    Aspiration pneumonia    Chronic kidney disease    Dementia    Iron deficiency anemia    Encephalopathy    Bilateral pneumonia    COVID-19 virus detected      PMH:   He  has a past medical history of Anemia, Dementia, Diabetes mellitus, Dysphagia, GERD (gastroesophageal reflux disease), History of alcohol abuse, History of cocaine use, History of marijuana use, Hypertension, Osteomyelitis, Poor historian, and Visual impairment.    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    Substance history: Etoh abuse, cocaine, tobacco    SKIN: bruised, L foot s/p metatarsal amputations 2022    SLP Recommendation and Plan/ MBS  SLP Swallowing Diagnosis: mild, oral dysphagia, functional pharyngeal phase (09/13/24 1540)  SLP Diet Recommendation: soft to chew textures, ground, thin liquids (09/13/24 1540)  Recommended Precautions and Strategies: small bites of food and sips of liquid, upright posture during/after eating, check mouth frequently for oral residue/pocketing, general aspiration precautions (09/13/24 1540)  SLP Rec. for Method of Medication Administration: meds whole, meds crushed, with puree, as tolerated, meds via alternate route (09/13/24 1540)    Labs    Labs Reviewed: Yes    Results from last 7 days   Lab Units 09/17/24  0649 09/16/24  1428 09/15/24  0657 09/14/24  0543 09/12/24  0701  09/12/24  0342 09/11/24  1816   GLUCOSE mg/dL 280* 200* 196* 150*   < > 75 87   BUN mg/dL 44* 41* 37* 32*   < > 34* 40*   CREATININE mg/dL 1.26 1.27 1.14 1.17   < > 1.16 1.62*   SODIUM mmol/L 137 138 136 137   < > 134* 132*   CHLORIDE mmol/L 106 105 104 104   < > 104 101   POTASSIUM mmol/L 4.4 4.8 4.3 4.7   < > 6.3* 6.4*   PHOSPHORUS mg/dL  --   --   --  3.4  --   --   --    MAGNESIUM mg/dL  --   --   --  1.6  --  1.9 2.2   ALT (SGPT) U/L 38 49* 47* 55*   < > 78* 85*   LACTATE mmol/L  --   --   --   --   --   --  0.5    < > = values in this interval not displayed.       Results from last 7 days   Lab Units 09/17/24  0649 09/16/24  1428 09/15/24  0657 09/14/24  0543 09/13/24  0746 09/12/24  0342 09/11/24  1816   ALBUMIN g/dL 3.1* 3.3* 3.2* 3.3* 3.6   < > 3.5   CRP mg/dL  --   --   --  4.89* 3.67*  --  1.64*    < > = values in this interval not displayed.       Results from last 7 days   Lab Units 09/17/24  1158 09/17/24  0754 09/16/24  2058 09/16/24  1748 09/16/24  1126 09/16/24  0642   GLUCOSE mg/dL 240* 307* 297* 291* 181* 185*     Lab Results   Lab Value Date/Time    HGBA1C 5.60 09/15/2024 0658    HGBA1C 7.00 (H) 10/16/2022 0432    HGBA1C 8.7 (H) 06/25/2022 0242    HGBA1C 13.4 04/14/2022 1058               Medications    Medications Reviewed: yes    Scheduled Meds:amLODIPine, 10 mg, Per G Tube, Q24H  ARIPiprazole, 5 mg, Per G Tube, Daily  brimonidine, 1 drop, Both Eyes, TID  carvedilol, 12.5 mg, Per G Tube, Q12H  cefepime, 2,000 mg, Intravenous, Q8H  cloNIDine, 0.2 mg, Per G Tube, Q8H  dexAMETHasone, 6 mg, Per G Tube, Daily   Or  dexAMETHasone, 6 mg, Intravenous, Daily  doxycycline, 100 mg, Per G Tube, Q12H  hydrALAZINE, 100 mg, Per G Tube, Q8H  insulin lispro, 2-7 Units, Subcutaneous, 4x Daily AC & at Bedtime  ipratropium-albuterol, 3 mL, Nebulization, Q6H - RT  lansoprazole, 30 mg, Per G Tube, Q AM  linezolid, 600 mg, Per PEG Tube, BID  palliative care oral rinse, 5 mL, Mouth/Throat, 4x Daily  QUEtiapine, 25 mg,  "Per G Tube, Nightly  sodium chloride, 10 mL, Intravenous, Q12H  terazosin, 5 mg, Per G Tube, Q12H  timolol, 1 drop, Both Eyes, BID  torsemide, 5 mg, Per G Tube, Daily  Valproic Acid, 150 mg, Per G Tube, Q8H      Continuous Infusions:     PRN Meds:.  acetaminophen    senna-docusate sodium **AND** polyethylene glycol **AND** [DISCONTINUED] bisacodyl **AND** bisacodyl    Calcium Replacement - Follow Nurse / BPA Driven Protocol    dextrose    glucagon (human recombinant)    hydrOXYzine    Magnesium Standard Dose Replacement - Follow Nurse / BPA Driven Protocol    Phosphorus Replacement - Follow Nurse / BPA Driven Protocol    Potassium Replacement - Follow Nurse / BPA Driven Protocol    sodium chloride    sodium chloride    Intake/Ouptut 24 hrs (0701 - 0700)   I&O's Reviewed: yes    Intake & Output (last day)         09/16 0701 09/17 0700 09/17 0701 09/18 0700    P.O. 0 60    Other 719 80    NG/GT 1659     Total Intake(mL/kg) 2378 (28.2) 140 (1.7)    Urine (mL/kg/hr) 2200 (1.1) 600 (0.9)    Total Output 2200 600    Net +178 -460                   Anthropometrics     Height: Height: 182.9 cm (72\")  Last Filed Weight: Weight: 84.2 kg (185 lb 11.2 oz) (09/15/24 0600)  Method: Weight Method: Bed scale  BMI: BMI (Calculated): 25.2    UBW: per [facility] pt weighed 186.2lb 9-11-24/ BMI 25    Weight change:  alternating wt's suspect 2nd fluid      Weight      Weight (kg) Weight (lbs) Weight Method   1/18/2024 85.548 kg  188 lb 9.6 oz  Bed scale    1/19/2024 82.691 kg  182 lb 4.8 oz  Bed scale    1/20/2024 83.054 kg  183 lb 1.6 oz  Bed scale    1/21/2024 85.14 kg  187 lb 11.2 oz  Bed scale    2/5/2024 85.1 kg  187 lb 9.8 oz  Estimated    2/22/2024 84.823 kg  187 lb     3/10/2024 84.823 kg  187 lb  Stated     98.1 kg  216 lb 4.3 oz  Bed scale    3/11/2024 90 kg  198 lb 6.6 oz  Bed scale    3/12/2024 93.7 kg  206 lb 9.1 oz  Bed scale    3/13/2024 92.8 kg  204 lb 9.4 oz  Bed scale    3/15/2024 92 kg  202 lb 13.2 oz   " "  3/16/2024 91.1 kg  200 lb 13.4 oz     3/17/2024 98.8 kg  217 lb 13 oz     3/20/2024 94.6 kg  208 lb 8.9 oz  Bed scale    3/21/2024 99 kg  218 lb 4.1 oz     3/22/2024 98.2 kg  216 lb 7.9 oz     3/25/2024 93 kg  205 lb 0.4 oz  Bed scale    3/26/2024 91.2 kg  201 lb 1 oz     3/27/2024 92 kg  202 lb 13.2 oz     3/28/2024 91.5 kg  201 lb 11.5 oz     3/29/2024 91 kg  200 lb 9.9 oz     3/31/2024 90.8 kg  200 lb 2.8 oz  Bed scale    2024 90.7 kg  199 lb 15.3 oz  Bed scale    2024 92.1 kg  203 lb 0.7 oz  Bed scale    2024 79.379 kg  175 lb     2024 79.379 kg  175 lb     2024 79.379 kg  175 lb     2024 79.4 kg  175 lb 0.7 oz  Estimated    2024 86.229 kg  190 lb 1.6 oz  Bed scale        Nutrition Focused Physical Exam     Date:     Unable to perform due to COVID Isolation      Subjective   Reported/Observed/Food/Nutrition Related History:     : pt sleeping in bed, on nasal cannula    Per RN: pt still very confused, combative this am, has been tolerating TF, no bm for a couple days, not eating much    -: per RN: pt passed SLP evaluation, but not eating much, still confused    :Pt sleeping in bed, on nasal cannula    Per RN: pt was combative last night, still very confused, has had 600ml UOP today, D5% @50ml started for hyperglycemia      Needs Assessment   Date: 24    Height used:Height: 182.9 cm (72\")  Weights used: 186.2lb/ 85kg    Estimated Calorie needs: ~ 1900kcal  Method:  20-25Kcals/K-2125kcal  Method:  MSJ x 1.2: 1996kcal      Estimated Protein needs: ~85g protein  Method:0.8-1.2g protein per k-102g protein    Current Nutrition Prescription     PO: Diet: Cardiac, Diabetic, Renal; Healthy Heart (2-3 Na+); Consistent Carbohydrate; Low Potassium; Texture: Soft to Chew (NDD 3); Soft to Chew: Chopped Meat; Fluid Consistency: Thin (IDDSI 0)  Oral Nutrition Supplement:  Intake:  10% of 1 meal    EN: [employer] Renal  Goal Rate: 45ml  Water Flushes: 15ml  Modular: " None  Route: PEG    At goal over: 22Hrs/day     Rx will supply:   Goal Volume 990  mL/day     Flush Volume 330 mL/day     Energy 1980 Kcal/day 104 % Est Need   Protein 90 g/day 106 % Est Need   Fiber 0 g/day     Water in   mL     Total Water 1043 mL     Meet DRI No        --------------------------------------------------------------------------  Product/Rate verified at bedside: N/A  Infusing Rate at time of visit: pt in Covid solation    Average Delivery from [facility]: ? Data > 24 hour delivery  Volume ?1539 mL/day ?155  % Goal Vol.   Flush Volume 839 mL/day     Energy  Kcal/day  % Est Need   Protein  g/day  % Est Need   Fiber  g/day     Water in  EN  mL     Total Water  mL     Meet DRI N/A            Assessment & Plan   Nutrition Diagnosis   Date: 24 Updated:   Problem Inadequate energy intake    Etiology ARF/Covid 19   Signs/Symptoms Po intake 19%   Status: Active- remains on EN support    Goal:   Nutrition to support treatment, Increase intake, and Adjust EN      Nutrition Intervention      Follow treatment progress, Care plan reviewed, Adjusted supplement, Nutrition support order placed    Change supplement to Boost Glucose control  2x/day    Po intake < 25%, rec maintain EN for now     As hyperkalemia resolved, will transition pt back to standard formula    Change EN to Diabetisource @70ml/hr, free water @15ml/hr via PEG    TF at goal volume (22 hour feed) will provide 1540ml, 1848kcal, 97% kcal needs, 92g protein, 108% protein needs, 23g fiber, 1593ml free water      Monitoring/Evaluation:   Per protocol, I&O, Supplement intake, Pertinent labs, Weight, Skin status, GI status, Symptoms    Sonia Peraza, JAGDEEP  Time Spent: 45min

## 2024-09-17 NOTE — DISCHARGE SUMMARY
[facility] Medicine Services  DISCHARGE SUMMARY    Patient Name: Omkar Nava  : 1955  MRN: [id number]    Date of Admission: 2024  6:04 PM  Date of Discharge:  2024  Primary Care Physician: Alberto Dye MD    Consults       Date and Time Order Name Status Description    2024 12:34 AM Inpatient Infectious Diseases Consult Completed             Hospital Course     Presenting Problem: f/u AMS     Active Hospital Problems    Diagnosis  POA    **Hypoxia [R09.02]  Yes    COVID-19 virus detected [U07.1]  Unknown    Bilateral pneumonia [J18.9]  Yes    Encephalopathy [G93.40]  Yes    Chronic kidney disease [N18.9]  Yes    Aspiration pneumonia [J69.0]  Yes    Dementia [F03.90]  Yes    Anemia of chronic disease [D63.8]  Yes    Neurocognitive disorder [R41.9]  Yes    Essential hypertension [I10]  Yes    Type 2 diabetes mellitus [E11.9]  Yes    Iron deficiency anemia [D50.9]  Yes      Resolved Hospital Problems   No resolved problems to display.          Hospital Course:  Omkar Nava is a 69 y.o. male  with PMH significant for diabetes mellitus, complicated by blindness, osteomyelitis, CKD, early onset dementia with psychosis, decreased mobility, and heart failure with preserved EF. He presents from [facility] for decreased mental status and fever.      Recent admission 3/10-24 due to hypoxic respiratory failure requiring intubation in setting of recurrent aspiration and multifocal pneumonia; respiratory culture grew ESBL Klebsiella at that time. Had PEG tube placed at that time.       Acute hypoxia  Sepsis secondary to multifocal pneumonia  History of ESBL Klebsiella  COVID-19 positive  --Presented tachypneic, febrile, and oxygen requirement of 3 L - patient reports he is on 2 liters at [facility] but does not wear all the time, he has been stable on room air here  --COVID-19 test positive day of admission. Multifocal pneumonia on CT scan. Chronic aspiration  with PEG tube in place. Suspect findings related to chronic aspiration and acute COVID, however, note history of severe respiratory failure and ESBL Klebsiella in previous sputum culture.  --ID/Dr. Gordon following, states that likely aspiration PNA + possible secondary bacterial infection due to COVID. States that slightly increased risk of ESBL but more distant. Currently on Linezolid, Cefepime and Doxycycline -- we have deescalated to PO Linezolid and Cefuroxime through 9/19/14 to facilitate discharge  --Completed 5 days of Remdesivir 9/16/24, continue Decadron through 9/21/24 for 10 days total  --Blood cultures NGTD, negative urinary antigens, positive MRSA PCR  --Aggressive bronchopulmonary hygiene, IS, OPEP, suctioning  --Weaned from 4 liters to room air   --Monitor for further fevers, last was 101 on 9/13/24 AM     Acute on chronic anemia  --Baseline Hb near 8, Hb was 6.9 on presentation  --No signs of bleeding at this time. MCV is elevated. Fecal occult in ED is negative. Suspect related to acute on chronic illness.  --B12 and folate okay. Iron studies c/w anemia of chronic disease.  --s/p 1 unit PRBC on 9/11/24, monitor closely     Hyperkalemia  --K 6.4 on admission, received treatment in ED  --Improved, monitor     JAYY on CKD  --Cr 1.62 on admission but has fluctuated significantly over the last year. Last value 0.92 ~5 months ago. Will monitor output, hold nephrotoxic meds.  --Better with IVF's     Chronic aspiration  --PEG tube in place (placed during recent admission 4/1/24 by Dr. Brunner), was discharged with strict NPO status but since then has been able to advance back to PO diet at [place] per family  --SLP has seen and performed MBS 9/13/24: soft to chew, ground, thin liquids  --Tube feeds per Nutrition  --Poor oral intake in general so Tf were continuous here,      HFpEF  HTN  --Appears compensated at this time   --Continue Coreg, Torsemide, Clonidine, Terazosin, Hydralazine      T2DM with  hypoglycemia  --HbA1c 7% October 2022 - repeat here 5.6%  --Resolved, monitor  --SSI, adjust as needed     Dementia with agitation  Seizure disorder?  --Given decreased responsiveness leading to initial presentation we have been holding most of sedating meds for now.  Placed on Depakote for agitation per Neurology in past? - now resumed here  --Resumed Seroquel, Abilify  -- resume prozac once zyvox dose is complete     Patient has remained clinically stable and will be discharged home today.     Addendum: discharge cancelled. Nurse was trying to give enema and patient became agitated and had to be put in restraints. Likely due to him not understanding what was happening. Patient became calm once enema was given. Will cancel dc and monitor today for further agitation       Discharge Follow Up Recommendations for outpatient labs/diagnostics:   Follow up with facility provider in couple days or pcp one week     Day of Discharge     HPI:   Patient is resting in bed in NAD. He awakens easily and follows commands. She states he needs to spit. He does mumble at times when questions are asked but alert to self     Review of Systems  Unable to reliably assess     Vital Signs:   Temp:  [98.2 °F (36.8 °C)-99.3 °F (37.4 °C)] 98.2 °F (36.8 °C)  Heart Rate:  [58-67] 63  Resp:  [16-20] 16  BP: (160-187)/(68-77) 187/77  Flow (L/min):  [2] 2      Physical Exam:  Constitutional: No acute distress, awake, alert  HENT: NCAT, mucous membranes moist  Respiratory: Clear to auscultation bilaterally, respiratory effort normal   Cardiovascular: RRR, no murmurs, rubs, or gallops  Gastrointestinal: Positive bowel sounds, soft, nontender, nondistended, PEG in place with TF   Musculoskeletal: No bilateral ankle edema  Psychiatric: Appropriate affect, cooperative  Neurologic: Oriented x 1, strength symmetric in all extremities,  speech clear but mumbles   Skin: No rashes      Pertinent  and/or Most Recent Results     LAB RESULTS:      Lab  09/16/24  1427 09/15/24  0657 09/14/24  0543 09/13/24  0746 09/12/24  0343 09/11/24  1816   WBC 9.53 7.78 7.83 7.80 6.57 6.37   HEMOGLOBIN 8.6* 8.5* 8.4* 9.2* 8.6* 6.9*   HEMATOCRIT 26.4* 25.6* 25.6* 28.9* 26.9* 22.4*   PLATELETS 190 164 170 184 165 173   NEUTROS ABS  --   --   --  5.84 4.32 4.42   IMMATURE GRANS (ABS)  --   --   --  0.03 0.02 0.03   LYMPHS ABS  --   --   --  1.28 1.17 0.92   MONOS ABS  --   --   --  0.61 1.04* 0.94*   EOS ABS  --   --   --  0.03 0.01 0.04   MCV 94.0 94.1 93.1 96.0 96.1 100.9*   SED RATE  --   --   --   --   --  27*   CRP  --   --  4.89* 3.67*  --  1.64*   PROCALCITONIN  --   --   --   --   --  0.11   LACTATE  --   --   --   --   --  0.5         Lab 09/17/24  0649 09/16/24  1428 09/15/24  0658 09/15/24  0657 09/14/24  0543 09/13/24  0746 09/12/24  0725 09/12/24  0342 09/11/24  1816   SODIUM 137 138  --  136 137 137  --  134* 132*   POTASSIUM 4.4 4.8  --  4.3 4.7 5.4*   < > 6.3* 6.4*   CHLORIDE 106 105  --  104 104 105  --  104 101   CO2 23.0 23.0  --  23.0 22.0 22.0  --  19.0* 22.0   ANION GAP 8.0 10.0  --  9.0 11.0 10.0  --  11.0 9.0   BUN 44* 41*  --  37* 32* 30*  --  34* 40*   CREATININE 1.26 1.27  --  1.14 1.17 1.22  --  1.16 1.62*   EGFR 62.5 61.9  --  70.5 68.3 65.0  --  69.0 46.2*   GLUCOSE 280* 200*  --  196* 150* 97  --  75 87   CALCIUM 8.7 9.0  --  8.9 8.4* 9.1  --  8.8 8.7   MAGNESIUM  --   --   --   --  1.6  --   --  1.9 2.2   PHOSPHORUS  --   --   --   --  3.4  --   --   --   --    HEMOGLOBIN A1C  --   --  5.60  --   --   --   --   --   --     < > = values in this interval not displayed.         Lab 09/17/24  0649 09/16/24  1428 09/15/24  0657 09/14/24  0543 09/13/24  0746   TOTAL PROTEIN 6.4 6.6 6.4 6.0 6.5   ALBUMIN 3.1* 3.3* 3.2* 3.3* 3.6   GLOBULIN 3.3 3.3 3.2 2.7 2.9   ALT (SGPT) 38 49* 47* 55* 70*   AST (SGOT) 21 28 20 27 36   BILIRUBIN <0.2 0.2 0.2 0.3 0.2   ALK PHOS 101 102 93 105 96                 Lab 09/11/24 2005 09/11/24  1816   IRON  --  26*   IRON  SATURATION (TSAT)  --  7*   TIBC  --  355   TRANSFERRIN  --  238   FERRITIN  --  308.30   FOLATE  --  12.10   VITAMIN B 12  --  1,158*   ABO TYPING O  --    RH TYPING Positive  --    ANTIBODY SCREEN Negative  --          Lab 09/11/24 2151   PH, ARTERIAL 7.369   PCO2, ARTERIAL 42.2   PO2 .0*   FIO2 21   HCO3 ART 24.3   BASE EXCESS ART -1.0*   CARBOXYHEMOGLOBIN 1.3     Brief Urine Lab Results  (Last result in the past 365 days)        Color   Clarity   Blood   Leuk Est   Nitrite   Protein   CREAT   Urine HCG        09/11/24 1905 Yellow   Clear   Negative   Trace   Negative   Trace                 Microbiology Results (last 10 days)       Procedure Component Value - Date/Time    S. Pneumo Ag Urine or CSF - Urine, Urine, Clean Catch [[id number]]  (Normal) Collected: 09/12/24 1158    Lab Status: Final result Specimen: Urine, Clean Catch Updated: 09/12/24 1902     Strep Pneumo Ag Negative    Legionella Antigen, Urine - Urine, Urine, Clean Catch [[id number]]  (Normal) Collected: 09/12/24 1156    Lab Status: Final result Specimen: Urine, Clean Catch Updated: 09/12/24 1902     LEGIONELLA ANTIGEN, URINE Negative    MRSA Screen, PCR (Inpatient) - Swab, Nares [[id number]]  (Abnormal) Collected: 09/12/24 0943    Lab Status: Final result Specimen: Swab from Nares Updated: 09/12/24 1106     MRSA PCR Positive    Narrative:      The negative predictive value of this diagnostic test is high and should only be used to consider de-escalating anti-MRSA therapy. A positive result may indicate colonization with MRSA and must be correlated clinically.    Blood Culture - Blood, Arm, Right [[id number]]  (Normal) Collected: 09/11/24 2144    Lab Status: Final result Specimen: Blood from Arm, Right Updated: 09/16/24 2246     Blood Culture No growth at 5 days    Narrative:      Less than seven (7) mL's of blood was collected.  Insufficient quantity may yield false negative results.    Blood Culture - Blood, Arm, Left [[id number]]  (Normal)  Collected: 09/11/24 2144    Lab Status: Final result Specimen: Blood from Arm, Left Updated: 09/16/24 2246     Blood Culture No growth at 5 days    Narrative:      Less than seven (7) mL's of blood was collected.  Insufficient quantity may yield false negative results.    Respiratory Panel PCR w/COVID-19(SARS-CoV-2) GIANNI/SIENNA/ANNA/PAD/COR/ASHIA In-House, NP Swab in UTM/VTM, 2 HR TAT - Swab, Nasopharynx [[id number]]  (Abnormal) Collected: 09/11/24 1851    Lab Status: Final result Specimen: Swab from Nasopharynx Updated: 09/11/24 2000     ADENOVIRUS, PCR Not Detected     Coronavirus 229E Not Detected     Coronavirus HKU1 Not Detected     Coronavirus NL63 Not Detected     Coronavirus OC43 Not Detected     COVID19 Detected     Human Metapneumovirus Not Detected     Human Rhinovirus/Enterovirus Not Detected     Influenza A PCR Not Detected     Influenza B PCR Not Detected     Parainfluenza Virus 1 Not Detected     Parainfluenza Virus 2 Not Detected     Parainfluenza Virus 3 Not Detected     Parainfluenza Virus 4 Not Detected     RSV, PCR Not Detected     Bordetella pertussis pcr Not Detected     Bordetella parapertussis PCR Not Detected     Chlamydophila pneumoniae PCR Not Detected     Mycoplasma pneumo by PCR Not Detected    Narrative:      In the setting of a positive respiratory panel with a viral infection PLUS a negative procalcitonin without other underlying concern for bacterial infection, consider observing off antibiotics or discontinuation of antibiotics and continue supportive care. If the respiratory panel is positive for atypical bacterial infection (Bordetella pertussis, Chlamydophila pneumoniae, or Mycoplasma pneumoniae), consider antibiotic de-escalation to target atypical bacterial infection.            FL Video Swallow With Speech Single Contrast    Result Date: 9/13/2024  FL VIDEO SWALLOW W SPEECH SINGLE-CONTRAST Date of Exam: 9/13/2024 3:30 PM EDT Indication: dysphagia.   Comparison: None available.  Technique:   The speech pathologist administered food and/or liquid mixed with barium to the patient with cine/video imaging.  Imaging assistance was provided to the speech pathologist and an image was saved. Fluoroscopic Time: 1 minute and 24 seconds Number of Images: 6 associated fluoroscopic loops were saved Findings: No aspiration was seen during fluoroscopic guided modified barium swallowing series. Please see speech therapy report for full details and recommendations.     Impression: Fluoroscopy provided for a modified barium swallow. No aspiration was seen during swallowing evaluation. Please see speech therapy report for full details and recommendations. Report dictated by: Maggi Silverman PA-c  I have personally reviewed this case and agree with the findings above: Electronically Signed: Anjum Prado MD  9/13/2024 3:57 PM EDT  Workstation ID: [id number]    CT Chest With Contrast Diagnostic    Result Date: 9/11/2024  CT CHEST W CONTRAST DIAGNOSTIC, CT ABDOMEN PELVIS W CONTRAST Date of Exam: 9/11/2024 7:26 PM EDT Indication: covid, fever. Comparison: 3/14/2024 Technique: Axial CT images were obtained of the chest, abdomen and pelvis after the uneventful intravenous administration of 85 cc Isovue-300.  Reconstructed coronal and sagittal images were also obtained. Automated exposure control and iterative construction methods were used. Findings: CT chest: Lower Neck: Unremarkable. Hilum and Mediastinum: Scattered prominent mediastinal and perihilar lymph nodes. No pathologically enlarged lymph nodes. Mild cardiomegaly. Trace pericardial effusion. The esophagus is mildly patulous. No definite suspicious mass. The vasculature is grossly unremarkable. Lung Parenchyma and Pleura: Patchy opacities scattered throughout the lungs, that are more consolidative in the lower lobes bilaterally. Trace bilateral pleural effusions. No pneumothorax. The central airways are patent. Soft tissues: Unremarkable. Osseous structures:  No acute osseous abnormality.. Mild cortical deformity of the superior endplate of T9 most likely represents a small Schmorl's node, unchanged. CT abdomen and pelvis: Liver: Liver is normal in size and CT density. No focal lesions. Gallbladder: The gallbladder is normal without evidence of radiopaque stones. Bile Ducts: No billiary dcutal dilation. Spleen: Spleen is normal in size and CT density. Pancreas: Pancreas is normal. There is no evidence of pancreatic mass or peripancreatic fluid. Adrenals: Adrenal glands are unremarkable. Kidneys: Kidneys are normal in size. There are no stones or hydronephrosis. Gastrointestinal: Mild to moderate stool burden noted throughout the colon with a small to moderate-sized stool ball within the rectum. No acute inflammatory changes. No significant distention to suggest bowel obstruction. Mild diverticulosis of the descending and sigmoid colon without evidence of acute diverticulitis. G-tube is in adequate position. Possible small lipoma within the greater curvature of the stomach. Bladder: Circumferential bladder wall thickening Pelvis:  No suspecious mass. Peritoneum/Mesentery: No fluid collection, ascities, or free air.   Lymph Nodes: No lymphadenopathy. Vasculature: Unremarkable Abdominal Wall: Unremarkable Bony Structures: No acute osseous abnormality. Unchanged grade 1 anterolisthesis of L4 and L5.     Impression: Findings consistent with multifocal pneumonia/pneumonitis, most significantly involving the lower lobes bilaterally. No definite acute intra-abdominal intrapelvic abnormality. Mild circumferential bladder wall thickening is nonspecific but may represent cystitis. Please correlate with urinalysis. Evidence of possible fecal impaction with associated mild constipation. Electronically Signed: Ruben Medina DO  9/11/2024 7:50 PM EDT  Workstation ID: [id number]    CT Abdomen Pelvis With Contrast    Result Date: 9/11/2024  CT CHEST W CONTRAST DIAGNOSTIC, CT ABDOMEN  PELVIS W CONTRAST Date of Exam: 9/11/2024 7:26 PM EDT Indication: covid, fever. Comparison: 3/14/2024 Technique: Axial CT images were obtained of the chest, abdomen and pelvis after the uneventful intravenous administration of 85 cc Isovue-300.  Reconstructed coronal and sagittal images were also obtained. Automated exposure control and iterative construction methods were used. Findings: CT chest: Lower Neck: Unremarkable. Hilum and Mediastinum: Scattered prominent mediastinal and perihilar lymph nodes. No pathologically enlarged lymph nodes. Mild cardiomegaly. Trace pericardial effusion. The esophagus is mildly patulous. No definite suspicious mass. The vasculature is grossly unremarkable. Lung Parenchyma and Pleura: Patchy opacities scattered throughout the lungs, that are more consolidative in the lower lobes bilaterally. Trace bilateral pleural effusions. No pneumothorax. The central airways are patent. Soft tissues: Unremarkable. Osseous structures: No acute osseous abnormality.. Mild cortical deformity of the superior endplate of T9 most likely represents a small Schmorl's node, unchanged. CT abdomen and pelvis: Liver: Liver is normal in size and CT density. No focal lesions. Gallbladder: The gallbladder is normal without evidence of radiopaque stones. Bile Ducts: No billiary dcutal dilation. Spleen: Spleen is normal in size and CT density. Pancreas: Pancreas is normal. There is no evidence of pancreatic mass or peripancreatic fluid. Adrenals: Adrenal glands are unremarkable. Kidneys: Kidneys are normal in size. There are no stones or hydronephrosis. Gastrointestinal: Mild to moderate stool burden noted throughout the colon with a small to moderate-sized stool ball within the rectum. No acute inflammatory changes. No significant distention to suggest bowel obstruction. Mild diverticulosis of the descending and sigmoid colon without evidence of acute diverticulitis. G-tube is in adequate position. Possible  small lipoma within the greater curvature of the stomach. Bladder: Circumferential bladder wall thickening Pelvis:  No suspecious mass. Peritoneum/Mesentery: No fluid collection, ascities, or free air.   Lymph Nodes: No lymphadenopathy. Vasculature: Unremarkable Abdominal Wall: Unremarkable Bony Structures: No acute osseous abnormality. Unchanged grade 1 anterolisthesis of L4 and L5.     Impression: Findings consistent with multifocal pneumonia/pneumonitis, most significantly involving the lower lobes bilaterally. No definite acute intra-abdominal intrapelvic abnormality. Mild circumferential bladder wall thickening is nonspecific but may represent cystitis. Please correlate with urinalysis. Evidence of possible fecal impaction with associated mild constipation. Electronically Signed: Ruben Medina DO  9/11/2024 7:50 PM EDT  Workstation ID: [id number]    CT Head Without Contrast    Result Date: 9/11/2024  CT HEAD WO CONTRAST Date of Exam: 9/11/2024 7:26 PM EDT Indication: possible sz. Comparison: 3/11/2024 Technique: Axial CT images were obtained of the head without contrast administration.  Automated exposure control and iterative construction methods were used. Findings: There is no evidence of hemorrhage. There is no mass effect or midline shift. Diffuse brain atrophy with chronic microvascular ischemic changes There is no extracerebral collection. Ventricles are normal in size and configuration for patient's stated age. Posterior fossa is within normal limits. Calvarium and skull base appear intact. Mucosal thickening in the bilateral maxillary sinuses and ethmoids. Small air-fluid level left sphenoid. Stable chronic findings with possible surgical change in the right globe.     Impression: No acute intracranial abnormality Electronically Signed: Aramis Clark MD  9/11/2024 7:42 PM EDT  Workstation ID: [id number]             Results for orders placed during the hospital encounter of 01/15/24    Adult  Transthoracic Echo Complete With Contrast if Necessary Per Protocol    Interpretation Summary    Left ventricular ejection fraction appears to be 56 - 60%.    Left ventricular wall thickness is consistent with hypertrophy.    Estimated right ventricular systolic pressure from tricuspid regurgitation is mildly elevated (35-45 mmHg).    There is a small (1-2cm) pericardial effusion.    No evidence for pericardial tamponade      Plan for Follow-up of Pending Labs/Results:     Discharge Details        Discharge Medications        New Medications        Instructions Start Date   cefuroxime 500 MG tablet  Commonly known as: CEFTIN   500 mg, Oral, 2 Times Daily      dexAMETHasone 6 MG tablet  Commonly known as: DECADRON   6 mg, Per G Tube, Daily   Start Date: September 18, 2024     doxycycline 100 MG capsule  Commonly known as: MONODOX   100 mg, Per G Tube, Every 12 Hours      linezolid 600 MG tablet  Commonly known as: ZYVOX   600 mg, Per G Tube, 2 Times Daily             Changes to Medications        Instructions Start Date   FLUoxetine 20 MG/5ML solution  Commonly known as: PROzac  What changed: See the new instructions.   20 mg, Oral, Daily, Resume once zyvox is finished             Continue These Medications        Instructions Start Date   acetaminophen 160 MG/5ML solution  Commonly known as: TYLENOL   650 mg, Per G Tube, Every 6 Hours PRN      albuterol (2.5 MG/3ML) 0.083% nebulizer solution  Commonly known as: PROVENTIL   2.5 mg, Nebulization, Every 6 Hours - RT      amLODIPine 10 MG tablet  Commonly known as: NORVASC   10 mg, Per G Tube, Every 24 Hours Scheduled      ARIPiprazole 5 MG tablet  Commonly known as: ABILIFY   5 mg, Per G Tube, Daily      BD AutoShield Duo 30G X 5 MM misc  Generic drug: Insulin Pen Needle       brimonidine 0.2 % ophthalmic solution  Commonly known as: ALPHAGAN   1 drop, Both Eyes, 3 Times Daily      carvedilol 12.5 MG tablet  Commonly known as: COREG   12.5 mg, Per G Tube, Every 12  Hours Scheduled      cloNIDine 0.2 MG tablet  Commonly known as: CATAPRES   0.2 mg, Per G Tube, Every 8 Hours Scheduled      hydrALAZINE 100 MG tablet  Commonly known as: APRESOLINE   100 mg, Per G Tube, Every 8 Hours Scheduled      hydrOXYzine 25 MG tablet  Commonly known as: ATARAX   25 mg, Per G Tube, 3 Times Daily PRN      insulin detemir 100 UNIT/ML injection  Commonly known as: LEVEMIR   6 Units, Subcutaneous, Every 12 Hours Scheduled      insulin regular 100 UNIT/ML injection  Commonly known as: humuLIN R,novoLIN R   2-9 Units, Subcutaneous, Every 6 Hours Scheduled      insulin regular 100 UNIT/ML injection  Commonly known as: humuLIN R,novoLIN R   3 Units, Subcutaneous, Every 6 Hours Scheduled      ipratropium-albuterol 0.5-2.5 mg/3 ml nebulizer  Commonly known as: DUO-NEB   3 mL, Nebulization, Every 4 Hours PRN      lansoprazole 30 MG Tablet Delayed Release Dispersible disintegrating tablet  Commonly known as: PREVACID SOLUTAB   30 mg, Nasogastric, Every Early Morning      PALLIATIVE CARE ORAL RINSE (SIENNA)   5 mL, Mouth/Throat, 4 Times Daily      ProSource No Carb liquid   30 mL, Per G Tube, 2 Times Daily      QUEtiapine 25 MG tablet  Commonly known as: SEROquel   25 mg, Per G Tube, Nightly      sennosides 8.8 MG/5ML syrup  Commonly known as: SENOKOT   10 mL, Per G Tube, Nightly PRN      terazosin 5 MG capsule  Commonly known as: HYTRIN   5 mg, Per G Tube, Every 12 Hours Scheduled      timolol 0.5 % ophthalmic solution  Commonly known as: TIMOPTIC   1 drop, Both Eyes, 2 Times Daily      torsemide 5 MG tablet  Commonly known as: DEMADEX   5 mg, Per G Tube, Daily      Valproic Acid 250 MG/5ML solution syrup  Commonly known as: DEPAKENE   150 mg, Nasogastric, Every 8 Hours Scheduled      Vitamin D3 50 MCG (2000 UT) capsule   VITAMIN D3 50 MCG (2000 UT) CAPS             Stop These Medications      nexIUM 40 MG packet  Generic drug: esomeprazole              No Known Allergies      Discharge Disposition:  Skilled  Nursing Facility (DC - External)    Diet:  Hospital:  Diet Order   Procedures    Diet: Cardiac, Diabetic, Renal; Healthy Heart (2-3 Na+); Consistent Carbohydrate; Low Potassium; Texture: Soft to Chew (NDD 3); Soft to Chew: Chopped Meat; Fluid Consistency: Thin (IDDSI 0)       Diet Instructions       Diet: Cardiac Diets, Diabetic Diets, Renal Diets, Tube Feeding; Healthy Heart (2-3 Na+); Soft to Chew (NDD 3); Chopped Meat; Thin (IDDSI 0); Consistent Carbohydrate; Low Potassium; Continuous; novasorce 45ml/hr water flush 15 ml/hr      Discharge Diet:  Cardiac Diets  Diabetic Diets  Renal Diets  Tube Feeding       Cardiac Diet: Healthy Heart (2-3 Na+)    Texture: Soft to Chew (NDD 3)    Soft to Chew: Chopped Meat    Fluid Consistency: Thin (IDDSI 0)    Diabetic Diet: Consistent Carbohydrate    Renal Diet: Low Potassium    Feeding Type: Continuous    Formula & Rate: novasorce 45ml/hr water flush 15 ml/hr    Diet: Cardiac, Diabetic, Renal; Healthy Heart (2-3 Na+); Consistent Carbohydrate; Low Potassium; Texture: Soft to Chew (NDD 3); Soft to Chew: Chopped Meat; Fluid Consistency: Thin (IDDSI 0): Tube Feeding: Formula: Novasource Renal (Electrolyte Restricted); Feeding Type: Continuous; Start at: 10 mL/hr; Then Advance By: 10 mL/hr; Every: 4 hours; To Goal Rate of: 45 mL/hr; Total Daily Feeding Volume (mL/day): 990; Water Flush: 15 mL; Cherelle...: Tube Feeding             Activity:  Activity Instructions       Activity as Tolerated      Measure Blood Pressure              Restrictions or Other Recommendations:         CODE STATUS:    Code Status and Medical Interventions: CPR (Attempt to Resuscitate); Full Support; Cannot reach family, deferring to prior   Ordered at: 09/12/24 0003     Code Status (Patient has no pulse and is not breathing):    CPR (Attempt to Resuscitate)     Medical Interventions (Patient has pulse or is breathing):    Full Support     Comments:    Cannot reach family, deferring to prior       Future  Appointments   Date Time Provider Department Center   9/17/2024 11:45 AM MED 14 [facility] None   12/2/2024  9:45 AM Tommy Mcpherson MD [facility]   12/2/2024  1:45 PM Farzana Easley MD [facility]       Additional Instructions for the Follow-ups that You Need to Schedule       Discharge Follow-up with PCP   As directed       Currently Documented PCP:    Alberto Dye MD    PCP Phone Number:    [phone]     Follow Up Details: follow up with pcp one week or facility provider in 2 days or sooner                      Chely Lion, APRN  09/17/24      Time Spent on Discharge:  I spent  45  minutes on this discharge activity which included: face-to-face encounter with the patient, reviewing the data in the system, coordination of the care with the nursing staff as well as consultants, documentation, and entering orders.

## 2024-09-17 NOTE — THERAPY TREATMENT NOTE
Acute Care - Speech Language Pathology   Swallow Treatment Note [facility]     Patient Name: Omkar Nava  : 1955  MRN: [id number]  Today's Date: 2024               Admit Date: 2024    Visit Dx:     ICD-10-CM ICD-9-CM   1. Multifocal pneumonia  J18.9 486   2. COVID-19  U07.1 079.89   3. Hyperkalemia  E87.5 276.7   4. Acute anemia  D64.9 285.9   5. Moderate Alzheimer's dementia with psychotic disturbance, unspecified timing of dementia onset  G30.9 331.0    F02.B2 294.11   6. Oropharyngeal dysphagia  R13.12 787.22   7. 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, without long-term current use of insulin    Orthostatic hypotension    Acute osteomyelitis of left foot    Type 2 diabetes mellitus    Essential hypertension    Psychotic disorder    Neurocognitive disorder    S/P transmetatarsal amputation of foot, left    Hypoxia    Abscess of left foot    Anemia of chronic disease    Aspiration pneumonia    Cellulitis of left toe    Cellulitis of lower extremity    Cervical spine pain    Chronic kidney disease    Closed head injury without loss of consciousness    Dementia    Dental cavities    Diarrhea of presumed infectious origin    Displaced fracture of proximal phalanx of left great toe, initial encounter for open fracture    Dysphagia    Erectile dysfunction    Fall    Gas gangrene    Generalized muscle weakness    Hallucinations    Hypertensive heart and chronic kidney disease 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 failure, with stage 1 through stage 4 chronic kidney disease, or unspecified chronic kidney disease    Insomnia due to medical condition    Iron deficiency anemia    J15.0, ESBL Klebsiella pneumonia    Laceration without foreign body, left foot, subsequent encounter    Long term (current) use of insulin     Other acute osteomyelitis, left ankle and foot    Personal history of Methicillin resistant Staphylococcus aureus infection    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    Protein calorie malnutrition    Simple chronic bronchitis    Tobacco use    Type 2 diabetes mellitus with diabetic neuropathy, unspecified    Wound infection, posttraumatic    Type 2 diabetes mellitus with diabetic chronic kidney disease    Encephalopathy    Bilateral pneumonia    Acute kidney injury superimposed on chronic kidney disease    Acute type 1 respiratory failure    COVID-19 virus detected     Past Medical History:   Diagnosis Date    Anemia     Dementia     Diabetes mellitus     Dysphagia     GERD (gastroesophageal reflux disease)     History of alcohol abuse     History of cocaine use     History of marijuana use     Hypertension     Osteomyelitis     Poor historian     records obtained from nursing home records & his family    Visual impairment      Past Surgical History:   Procedure Laterality Date    AMPUTATION FOOT Left 10/18/2022    Procedure: PARTIAL FIRST RAY AMPUTATION LEFT;  Surgeon: Yeison Petty MD;  Location:  [facility];  Service: Orthopedics;  Laterality: Left;    AMPUTATION FOOT Left 12/5/2022    Procedure: Transmetatarsal of Left Foot;  Surgeon: Yeison Petty MD;  Location:  [facility];  Service: Orthopedics;  Laterality: Left;    ENDOSCOPY N/A 3/14/2024    Procedure: ESOPHAGOGASTRODUODENOSCOPY WITH JEJUNAL TUBE INSERTION AT BEDSIDE;  Surgeon: Brunner, Mark I, MD;  Location:  [facility];  Service: Gastroenterology;  Laterality: N/A;    ENDOSCOPY W/ PEG TUBE PLACEMENT N/A 4/1/2024    Procedure: ESOPHAGOGASTRODUODENOSCOPY WITH PERCUTANEOUS ENDOSCOPIC GASTROSTOMY TUBE INSERTION;  Surgeon: Brunner, Mark I, MD;  Location:  [facility];  Service: Gastroenterology;  Laterality: N/A;  EGD with PEG placement.  Secured at 3.5 cm    EYE SURGERY         SLP Recommendation and Plan     SLP Diet Recommendation: soft to chew textures,  chopped, thin liquids (09/17/24 1513)  Recommended Precautions and Strategies: small bites of food and sips of liquid, upright posture during/after eating, check mouth frequently for oral residue/pocketing, general aspiration precautions (09/17/24 1513)  SLP Rec. for Method of Medication Administration: meds whole, meds crushed, with puree, as tolerated, meds via alternate route (09/17/24 1513)     Monitor for Signs of Aspiration: yes, notify SLP if any concerns (09/17/24 1513)        Anticipated Discharge Disposition (SLP): skilled nursing facility (09/17/24 1513)     Therapy Frequency (Swallow): 3 days per week (09/17/24 1513)  Predicted Duration Therapy Intervention (Days): 1 week (09/17/24 1513)  Oral Care Recommendations: Oral Care BID/PRN, Toothbrush (09/17/24 1513)        Daily Summary of Progress (SLP): progress toward functional goals as expected (09/17/24 1513)               Treatment Assessment (SLP): continued, oral dysphagia (09/17/24 1513)  Treatment Assessment Comments (SLP): Patient tolerated trials of soft solids with reduced bolus manipulation and required additional time, however, did so with no oral residue or overt s/sx aspiration. (09/17/24 1513)  Plan for Continued Treatment (SLP): continue treatment per plan of care (09/17/24 1513)         Plan of Care Reviewed With: patient  Progress: improving      SWALLOW EVALUATION (Last 72 Hours)       SLP Adult Swallow Evaluation       Row Name 09/17/24 1513                   Rehab Evaluation    Document Type therapy note (daily note)  -MM        Subjective Information no complaints  -MM        Patient Observations alert;cooperative  -MM        Patient/Family/Caregiver Comments/Observations no family present  -MM        Patient Effort good  -MM        Symptoms Noted During/After Treatment none  -MM           General Information    Patient Profile Reviewed yes  -MM        Pertinent History Of Current Problem see initial evaluation  -MM        Current  Method of Nutrition soft to chew textures;chopped;thin liquids;gastrostomy feedings  -MM           Pain    Additional Documentation Pain Scale: Numbers Pre/Post-Treatment (Group)  -MM           Pain Scale: Numbers Pre/Post-Treatment    Pretreatment Pain Rating 0/10 - no pain  -MM        Posttreatment Pain Rating 0/10 - no pain  -MM        Pre/Posttreatment Pain Comment Pt denied pain throughout session  -MM           Oral Motor Structure and Function    Dentition Assessment missing teeth  -MM        Secretion Management WNL/WFL  -MM        Mucosal Quality moist, healthy  -MM           SLP Treatment Clinical Impressions    Treatment Assessment (SLP) continued;oral dysphagia  -MM        Treatment Assessment Comments (SLP) Patient tolerated trials of soft solids with reduced bolus manipulation and required additional time, however, did so with no oral residue or overt s/sx aspiration.  -MM        Daily Summary of Progress (SLP) progress toward functional goals as expected  -MM        Plan for Continued Treatment (SLP) continue treatment per plan of care  -MM        Care Plan Review care plan/treatment goals reviewed;risks/benefits reviewed;current/potential barriers reviewed;patient/other agree to care plan  -MM           Recommendations    Therapy Frequency (Swallow) 3 days per week  -MM        Predicted Duration Therapy Intervention (Days) 1 week  -MM        SLP Diet Recommendation soft to chew textures;chopped;thin liquids  -MM        Recommended Precautions and Strategies small bites of food and sips of liquid;upright posture during/after eating;check mouth frequently for oral residue/pocketing;general aspiration precautions  -MM        Oral Care Recommendations Oral Care BID/PRN;Toothbrush  -MM        SLP Rec. for Method of Medication Administration meds whole;meds crushed;with puree;as tolerated;meds via alternate route  -MM        Monitor for Signs of Aspiration yes;notify SLP if any concerns  -MM         Anticipated Discharge Disposition (SLP) [facility]  -MM                  User Key  (r) = Recorded By, (t) = Taken By, (c) = Cosigned By      Initials Name Effective Dates    MM Karyn Pollock, MS CCC-SLP 08/30/24 -                     EDUCATION  The patient has been educated in the following areas:   Dysphagia (Swallowing Impairment).        SLP GOALS       Row Name 09/17/24 7593             (LTG) Patient will demonstrate functional swallow for    Diet Texture (Demonstrate functional swallow) soft to chew (chopped) textures  -MM      Liquid viscosity (Demonstrate functional swallow) thin liquids  -MM      [place] (Demonstrate functional swallow) with minimal cues (75-90% accuracy)  -MM      Time Frame (Demonstrate functional swallow) 1 week  -MM      Progress/Outcomes (Demonstrate functional swallow) good progress toward goal  -MM         (STG) Patient will tolerate therapeutic trials of    Consistencies Trialed (Tolerate therapeutic trials) soft to chew (chopped) textures;soft to chew (ground) textures;thin liquids  -MM      Desired Outcome (Tolerate therapeutic trials) without signs/symptoms of aspiration;with adequate oral prep/transit/clearance;without signs of distress  -MM      [place] (Tolerate therapeutic trials) with minimal cues (75-90% accuracy)  -MM      Time Frame (Tolerate therapeutic trials) 1 week  -MM      Progress/Outcomes (Tolerate therapeutic trials) goal met  -MM      Comment (Tolerate therapeutic trials) Pt tolerated trials of soft to chew chopped textures on this date and SLP recommending soft chopped diet at this time.  -MM         (STG) Lingual Strengthening Goal 1 (SLP)    Activity (Lingual Strengthening Goal 1, SLP) increase lingual tone/sensation/control/coordination/movement  -MM      Increase Lingual Tone/Sensation/Control/Coordination/Movement swallow trials;lingual resistance exercises  -MM      [place]/Accuracy (Lingual Strengthening Goal 1, SLP) with  minimal cues (75-90% accuracy)  -MM      Time Frame (Lingual Strengthening Goal 1, SLP) 1 week  -MM      Progress/Outcomes (Lingual Strengthening Goal 1, SLP) good progress toward goal  -MM      Comment (Lingual Strengthening Goal 1, SLP) Pt completed with cues.  -MM                User Key  (r) = Recorded By, (t) = Taken By, (c) = Cosigned By      Initials Name Provider Type    Karyn Gray MS CCC-SLP Speech and Language Pathologist                         Time Calculation:    Time Calculation- SLP       Row Name 09/17/24 1618             Time Calculation- SLP    SLP Start Time 1513  -MM      SLP Received On 09/17/24  -MM         Untimed Charges    SLP Treatment ST Treatment Swallow Minutes  - 40914  -MM      [id number] Treatment Swallow Minutes 34  -MM         Total Minutes    Untimed Charges Total Minutes 34  -MM       Total Minutes 34  -MM                User Key  (r) = Recorded By, (t) = Taken By, (c) = Cosigned By      Initials Name Provider Type    Karyn Grya MS CCC-SLP Speech and Language Pathologist                    Therapy Charges for Today       Code Description Service Date Service Provider Modifiers Qty    [id number]  ST TREATMENT SWALLOW 2 9/17/2024 Karyn Pollock MS CCC-SLP GN 1                 Karyn Pollock MS CCC-SLP  9/17/2024

## 2024-09-18 VITALS
SYSTOLIC BLOOD PRESSURE: 148 MMHG | WEIGHT: 184.6 LBS | HEART RATE: 55 BPM | HEIGHT: 72 IN | TEMPERATURE: 98.2 F | RESPIRATION RATE: 18 BRPM | OXYGEN SATURATION: 97 % | DIASTOLIC BLOOD PRESSURE: 71 MMHG | BODY MASS INDEX: 25 KG/M2

## 2024-09-18 PROBLEM — F01.C0 SEVERE VASCULAR DEMENTIA WITHOUT BEHAVIORAL DISTURBANCE, PSYCHOTIC DISTURBANCE, MOOD DISTURBANCE, OR ANXIETY: Status: ACTIVE | Noted: 2024-09-18

## 2024-09-18 LAB
ALBUMIN SERPL-MCNC: 3 G/DL (ref 3.5–5.2)
ALBUMIN/GLOB SERPL: 0.9 G/DL
ALP SERPL-CCNC: 117 U/L (ref 39–117)
ALT SERPL W P-5'-P-CCNC: 37 U/L (ref 1–41)
ANION GAP SERPL CALCULATED.3IONS-SCNC: 12 MMOL/L (ref 5–15)
AST SERPL-CCNC: 19 U/L (ref 1–40)
BILIRUB SERPL-MCNC: 0.2 MG/DL (ref 0–1.2)
BUN SERPL-MCNC: 48 MG/DL (ref 8–23)
BUN/CREAT SERPL: 39.7 (ref 7–25)
CALCIUM SPEC-SCNC: 8.8 MG/DL (ref 8.6–10.5)
CHLORIDE SERPL-SCNC: 106 MMOL/L (ref 98–107)
CO2 SERPL-SCNC: 21 MMOL/L (ref 22–29)
CREAT SERPL-MCNC: 1.21 MG/DL (ref 0.76–1.27)
EGFRCR SERPLBLD CKD-EPI 2021: 65.6 ML/MIN/1.73
GLOBULIN UR ELPH-MCNC: 3.3 GM/DL
GLUCOSE BLDC GLUCOMTR-MCNC: 210 MG/DL (ref 70–130)
GLUCOSE BLDC GLUCOMTR-MCNC: 249 MG/DL (ref 70–130)
GLUCOSE SERPL-MCNC: 261 MG/DL (ref 65–99)
POTASSIUM SERPL-SCNC: 4.3 MMOL/L (ref 3.5–5.2)
PROT SERPL-MCNC: 6.3 G/DL (ref 6–8.5)
SODIUM SERPL-SCNC: 139 MMOL/L (ref 136–145)

## 2024-09-18 PROCEDURE — 94761 N-INVAS EAR/PLS OXIMETRY MLT: CPT

## 2024-09-18 PROCEDURE — 99232 SBSQ HOSP IP/OBS MODERATE 35: CPT | Performed by: NURSE PRACTITIONER

## 2024-09-18 PROCEDURE — 82948 REAGENT STRIP/BLOOD GLUCOSE: CPT

## 2024-09-18 PROCEDURE — 80053 COMPREHEN METABOLIC PANEL: CPT | Performed by: STUDENT IN AN ORGANIZED HEALTH CARE EDUCATION/TRAINING PROGRAM

## 2024-09-18 PROCEDURE — 94799 UNLISTED PULMONARY SVC/PX: CPT

## 2024-09-18 PROCEDURE — 94664 DEMO&/EVAL PT USE INHALER: CPT

## 2024-09-18 PROCEDURE — 25010000002 CEFEPIME PER 500 MG

## 2024-09-18 PROCEDURE — 63710000001 INSULIN LISPRO (HUMAN) PER 5 UNITS: Performed by: HOSPITALIST

## 2024-09-18 PROCEDURE — 63710000001 DEXAMETHASONE PER 0.25 MG: Performed by: STUDENT IN AN ORGANIZED HEALTH CARE EDUCATION/TRAINING PROGRAM

## 2024-09-18 RX ORDER — CEFUROXIME AXETIL 250 MG/1
500 TABLET ORAL EVERY 12 HOURS SCHEDULED
Status: DISCONTINUED | OUTPATIENT
Start: 2024-09-18 | End: 2024-09-18 | Stop reason: HOSPADM

## 2024-09-18 RX ADMIN — CLONIDINE HYDROCHLORIDE 0.2 MG: 0.2 TABLET ORAL at 13:00

## 2024-09-18 RX ADMIN — INSULIN LISPRO 3 UNITS: 100 INJECTION, SOLUTION INTRAVENOUS; SUBCUTANEOUS at 12:56

## 2024-09-18 RX ADMIN — TERAZOSIN HYDROCHLORIDE 5 MG: 5 CAPSULE ORAL at 09:38

## 2024-09-18 RX ADMIN — HYDRALAZINE HYDROCHLORIDE 100 MG: 50 TABLET ORAL at 06:48

## 2024-09-18 RX ADMIN — TIMOLOL MALEATE 1 DROP: 5 SOLUTION/ DROPS OPHTHALMIC at 09:42

## 2024-09-18 RX ADMIN — TORSEMIDE 5 MG: 10 TABLET ORAL at 09:41

## 2024-09-18 RX ADMIN — CARVEDILOL 12.5 MG: 12.5 TABLET, FILM COATED ORAL at 09:41

## 2024-09-18 RX ADMIN — VALPROIC ACID 150 MG: 250 SOLUTION ORAL at 06:48

## 2024-09-18 RX ADMIN — VALPROIC ACID 150 MG: 250 SOLUTION ORAL at 13:00

## 2024-09-18 RX ADMIN — ARIPIPRAZOLE 5 MG: 10 TABLET ORAL at 09:42

## 2024-09-18 RX ADMIN — DOXYCYCLINE 100 MG: 100 CAPSULE ORAL at 01:31

## 2024-09-18 RX ADMIN — DEXAMETHASONE 6 MG: 4 TABLET ORAL at 09:39

## 2024-09-18 RX ADMIN — BRIMONIDINE TARTRATE 1 DROP: 2 SOLUTION/ DROPS OPHTHALMIC at 09:42

## 2024-09-18 RX ADMIN — MINERAL OIL 5 ML: 1000 LIQUID ORAL at 09:43

## 2024-09-18 RX ADMIN — IPRATROPIUM BROMIDE AND ALBUTEROL SULFATE 3 ML: 2.5; .5 SOLUTION RESPIRATORY (INHALATION) at 01:11

## 2024-09-18 RX ADMIN — IPRATROPIUM BROMIDE AND ALBUTEROL SULFATE 3 ML: 2.5; .5 SOLUTION RESPIRATORY (INHALATION) at 07:11

## 2024-09-18 RX ADMIN — Medication 10 ML: at 09:42

## 2024-09-18 RX ADMIN — IPRATROPIUM BROMIDE AND ALBUTEROL SULFATE 3 ML: 2.5; .5 SOLUTION RESPIRATORY (INHALATION) at 12:40

## 2024-09-18 RX ADMIN — CLONIDINE HYDROCHLORIDE 0.2 MG: 0.2 TABLET ORAL at 06:48

## 2024-09-18 RX ADMIN — AMLODIPINE BESYLATE 10 MG: 10 TABLET ORAL at 09:41

## 2024-09-18 RX ADMIN — CEFEPIME 2000 MG: 2 INJECTION, POWDER, FOR SOLUTION INTRAVENOUS at 06:46

## 2024-09-18 RX ADMIN — LINEZOLID 600 MG: 600 TABLET, FILM COATED ORAL at 09:38

## 2024-09-18 RX ADMIN — HYDRALAZINE HYDROCHLORIDE 100 MG: 50 TABLET ORAL at 13:00

## 2024-09-18 RX ADMIN — INSULIN LISPRO 3 UNITS: 100 INJECTION, SOLUTION INTRAVENOUS; SUBCUTANEOUS at 09:37

## 2024-09-18 RX ADMIN — LANSOPRAZOLE 30 MG: 15 TABLET, ORALLY DISINTEGRATING ORAL at 06:48

## 2024-09-18 RX ADMIN — MINERAL OIL 5 ML: 1000 LIQUID ORAL at 12:56

## 2024-09-18 NOTE — DISCHARGE SUMMARY
[facility] Medicine Services  DISCHARGE SUMMARY    Patient Name: Omkar Nava  : 1955  MRN: [id number]    Date of Admission: 2024  Date of Discharge:  2024  Primary Care Physician: Alberto Dye MD    Consults       Date and Time Order Name Status Description    2024 12:34 AM Inpatient Infectious Diseases Consult Completed             Hospital Course     Presenting Problem:   Hypoxia [R09.02]    Active Hospital Problems    Diagnosis  POA    **Hypoxia [R09.02]  Yes    Severe vascular dementia without behavioral disturbance, psychotic disturbance, mood disturbance, or anxiety [F01.C0]  Yes    COVID-19 virus detected [U07.1]  Unknown    Bilateral pneumonia [J18.9]  Yes    Encephalopathy [G93.40]  Yes    Chronic kidney disease [N18.9]  Yes    Aspiration pneumonia [J69.0]  Yes    Dementia [F03.90]  Yes    Anemia of chronic disease [D63.8]  Yes    Neurocognitive disorder [R41.9]  Yes    Essential hypertension [I10]  Yes    Type 2 diabetes mellitus [E11.9]  Yes    Iron deficiency anemia [D50.9]  Yes      Resolved Hospital Problems   No resolved problems to display.          Hospital Course:  Omkar Nava is a 69 y.o. male PMH T2DM, blindness, osteomyelitis, CKD, early onset dementia with psychosis, decreased mobility, heart failure with preserved EF presenting, chronic aspiration s/p PEG placed  presenting from [facility] for decreased mental status and fever.  Patient found with acute hypoxia secondary to multifocal pneumonia, sepsis, positive for COVID with history of ESBL Klebsiella.  Patient required 3 L oxygen when he is typically on 2L NC as needed.  This admission also noted with chronic aspiration with PEG tube in place.  ID consulted states send likely aspiration pneumonia and possibly secondary bacterial infection due to COVID.  Patient received linezolid, cefepime and doxycycline, later de-escalated to p.o. linezolid and Cefuroxime to facilitate  discharge. Patient is s/p remdesivir and will continue Decadron through 9/21 for total of 10 days.  Blood cultures NGTD, negative urinary antigen, positive MRSA PCR.  Patient also noted with JAYY on CKD and hyperkalemia that resolved over the course of hospitalization.    Assessment/Plan:  Sepsis secondary to multifocal pneumonia  History of ESBL Klebsiella  COVID-19 positive  - Presented tachypneic, febrile, oxygen requirements of 3 L reportedly 2 L at [facility] and does not wear all the time.  -COVID-19 test positive day of admission.  Multifocal pneumonia on CT scan, chronic aspiration with PEG tube placement.  Suspect findings related to chronic aspiration and acute COVID; however, no history of severe respiratory failure and ESBL Klebsiella pneumonia in previous sputum culture.  - ID consulted states aspiration pneumonia plus possible secondary bacterial infection due to COVID.  There is a slight increased risk of ESBL but more distant.  Patient was on linezolid, cefepime and doxycycline de-escalated to p.o. a cefuroxime and linezolid to facilitate discharge.  -Completed 5 days of remdesivir 9/16 continue Decadron through 9/2124 for 10 days total  - Blood cultures NGTD, negative urinary antigen, positive MRSA PCR  - Aggressive bronchopulmonary hygiene, I-S, OPEP, suctioning  - Weaned from 4 L to room air  - Last fever was 101 on 9/13/2024    Acute on chronic anemia  - Baseline hemoglobin near 8.  Hemoglobin 6.9 on admission  - No sign of bleeding, MCV elevated.  Fecal occult negative, suspect related to acute on chronic illness  - B12 and folate WNL, iron studies consistent with anemia of chronic disease  - S/p 1 unit PRBC on 9/11/2024    Hyperkalemia-resolved  - Potassium 6.4 on admission received treatment in the ED    JAYY on CKD  - CR 1.6 on admission but has fluctuated significantly over the last year.  Last value 0.92 proximally 5 months ago  - Improved with IVF    Chronic aspiration  - PEG tube in  place-replace per Dr. Mullen on/1/24 and was discharged with strict n.p.o. since, patient is been able to advance p.o. diet at [facility] per family son-SLP has seen and performed MBS 9/13 recommending soft to chew, ground, thin liquids  - Tube feeds per nutritionist  - Poor oral intake in general so TF was continued during hospitalization    HFpEF  HTN  - Compensated during the course of hospitalization  - Continue carvedilol, torsemide, clonidine, terazosin, hydralazine    T2DM with hyperglycemia  - A1c 5.6 noted during hospitalization    Dementia with agitation  Questionable seizure disorder  - Due to decreased responsiveness on admission, sedating medications were held.  Apparently, Depakote was prescribed for agitation per neurology in the past.  Seroquel, Depakote, Abilify eventually resumed  - resume Prozac in 24 hours after linezloid is complete      DIscharge Follow Up Recommendations for labs/diagnostics:  Follow-up with PCP in 1 week  Follow-up with Dr. Gordon in 1 week  Resume Prozac in 24 hours after  Linezolid is complete    Day of Discharge     HPI:   Patient resting in bed, calm.  No issues reported overnight        Vital Signs:   Temp:  [96.6 °F (35.9 °C)-98.7 °F (37.1 °C)] 98.7 °F (37.1 °C)  Heart Rate:  [56-63] 57  Resp:  [16-18] 18  BP: (135-177)/(67-89) 163/76     Physical Exam:  Constitutional: Awake, alert, chronically ill appearing  HENT: NCAT, mucous membranes moist  Respiratory: Clear to auscultation bilaterally, nonlabored respirations   Cardiovascular: RRR, no murmurs, rubs, or gallop  Gastrointestinal: Positive bowel sounds, soft, nontender, nondistended, PEG tube with continuous TF  Musculoskeletal: No bilateral ankle edema  Psychiatric: Appropriate affect, cooperative  Neurologic: Oriented to self, TREJO, mumbled speech   Skin: No rashes      Pertinent  and/or Most Recent Results     Results from last 7 days   Lab Units 09/18/24  0510 09/17/24  0649 09/16/24  1428 09/16/24  1427  "09/15/24  0657 09/14/24  0543 09/13/24  0746 09/12/24  0725 09/12/24  0343 09/12/24  0342 09/11/24  1816   WBC 10*3/mm3  --   --   --  9.53 7.78 7.83 7.80  --  6.57  --  6.37   HEMOGLOBIN g/dL  --   --   --  8.6* 8.5* 8.4* 9.2*  --  8.6*  --  6.9*   HEMATOCRIT %  --   --   --  26.4* 25.6* 25.6* 28.9*  --  26.9*  --  22.4*   PLATELETS 10*3/mm3  --   --   --  190 164 170 184  --  165  --  173   SODIUM mmol/L 139 137 138  --  136 137 137  --   --  134* 132*   POTASSIUM mmol/L 4.3 4.4 4.8  --  4.3 4.7 5.4* 5.3*  --  6.3* 6.4*   CHLORIDE mmol/L 106 106 105  --  104 104 105  --   --  104 101   CO2 mmol/L 21.0* 23.0 23.0  --  23.0 22.0 22.0  --   --  19.0* 22.0   BUN mg/dL 48* 44* 41*  --  37* 32* 30*  --   --  34* 40*   CREATININE mg/dL 1.21 1.26 1.27  --  1.14 1.17 1.22  --   --  1.16 1.62*   GLUCOSE mg/dL 261* 280* 200*  --  196* 150* 97  --   --  75 87   CALCIUM mg/dL 8.8 8.7 9.0  --  8.9 8.4* 9.1  --   --  8.8 8.7     Results from last 7 days   Lab Units 09/18/24  0510 09/17/24  0649 09/16/24  1428 09/15/24  0657 09/14/24  0543 09/13/24  0746 09/12/24  0342   BILIRUBIN mg/dL 0.2 <0.2 0.2 0.2 0.3 0.2 0.2   ALK PHOS U/L 117 101 102 93 105 96 91   ALT (SGPT) U/L 37 38 49* 47* 55* 70* 78*   AST (SGOT) U/L 19 21 28 20 27 36 37           Invalid input(s): \"TG\", \"LDLCALC\", \"LDLREALC\"  Results from last 7 days   Lab Units 09/15/24  0658 09/11/24  1816   HEMOGLOBIN A1C % 5.60  --    PROCALCITONIN ng/mL  --  0.11   LACTATE mmol/L  --  0.5       Brief Urine Lab Results  (Last result in the past 365 days)        Color   Clarity   Blood   Leuk Est   Nitrite   Protein   CREAT   Urine HCG        09/11/24 1905 Yellow   Clear   Negative   Trace   Negative   Trace                   Microbiology Results Abnormal       Procedure Component Value - Date/Time    Blood Culture - Blood, Arm, Right [[id number]]  (Normal) Collected: 09/11/24 2144    Lab Status: Final result Specimen: Blood from Arm, Right Updated: 09/16/24 2246     Blood Culture " No growth at 5 days    Narrative:      Less than seven (7) mL's of blood was collected.  Insufficient quantity may yield false negative results.    Blood Culture - Blood, Arm, Left [[id number]]  (Normal) Collected: 09/11/24 2144    Lab Status: Final result Specimen: Blood from Arm, Left Updated: 09/16/24 2246     Blood Culture No growth at 5 days    Narrative:      Less than seven (7) mL's of blood was collected.  Insufficient quantity may yield false negative results.    S. Pneumo Ag Urine or CSF - Urine, Urine, Clean Catch [[id number]]  (Normal) Collected: 09/12/24 1158    Lab Status: Final result Specimen: Urine, Clean Catch Updated: 09/12/24 1902     Strep Pneumo Ag Negative    Legionella Antigen, Urine - Urine, Urine, Clean Catch [[id number]]  (Normal) Collected: 09/12/24 1156    Lab Status: Final result Specimen: Urine, Clean Catch Updated: 09/12/24 1902     LEGIONELLA ANTIGEN, URINE Negative            Imaging Results (All)       Procedure Component Value Units Date/Time    FL Video Swallow With Speech Single Contrast [[id number]] Collected: 09/13/24 1546     Updated: 09/13/24 1600    Narrative:      FL VIDEO SWALLOW W SPEECH SINGLE-CONTRAST    Date of Exam: 9/13/2024 3:30 PM EDT    Indication: dysphagia.       Comparison: None available.    Technique:   The speech pathologist administered food and/or liquid mixed with barium to the patient with cine/video imaging.  Imaging assistance was provided to the speech pathologist and an image was saved.    Fluoroscopic Time: 1 minute and 24 seconds    Number of Images: 6 associated fluoroscopic loops were saved    Findings:  No aspiration was seen during fluoroscopic guided modified barium swallowing series. Please see speech therapy report for full details and recommendations.      Impression:      Impression:  Fluoroscopy provided for a modified barium swallow. No aspiration was seen during swallowing evaluation. Please see speech therapy report for full details and  recommendations.      Report dictated by: Maggi Silverman PA-c      I have personally reviewed this case and agree with the findings above:    Electronically Signed: Anjum Prado MD    9/13/2024 3:57 PM EDT    Workstation ID: [id number]    CT Chest With Contrast Diagnostic [[id number]] Collected: 09/11/24 1942     Updated: 09/11/24 1953    Narrative:      CT CHEST W CONTRAST DIAGNOSTIC, CT ABDOMEN PELVIS W CONTRAST    Date of Exam: 9/11/2024 7:26 PM EDT    Indication: covid, fever.    Comparison: 3/14/2024    Technique: Axial CT images were obtained of the chest, abdomen and pelvis after the uneventful intravenous administration of 85 cc Isovue-300.  Reconstructed coronal and sagittal images were also obtained. Automated exposure control and iterative   construction methods were used.      Findings:  CT chest:  Lower Neck: Unremarkable.    Hilum and Mediastinum: Scattered prominent mediastinal and perihilar lymph nodes. No pathologically enlarged lymph nodes.  Mild cardiomegaly. Trace pericardial effusion. The esophagus is mildly patulous. No definite suspicious mass.  The vasculature is grossly unremarkable.    Lung Parenchyma and Pleura: Patchy opacities scattered throughout the lungs, that are more consolidative in the lower lobes bilaterally. Trace bilateral pleural effusions. No pneumothorax.  The central airways are patent.    Soft tissues: Unremarkable.    Osseous structures: No acute osseous abnormality.. Mild cortical deformity of the superior endplate of T9 most likely represents a small Schmorl's node, unchanged.    CT abdomen and pelvis:  Liver: Liver is normal in size and CT density. No focal lesions.    Gallbladder: The gallbladder is normal without evidence of radiopaque stones.    Bile Ducts: No billiary dcutal dilation.    Spleen: Spleen is normal in size and CT density.    Pancreas: Pancreas is normal. There is no evidence of pancreatic mass or peripancreatic fluid.    Adrenals: Adrenal glands are  unremarkable.    Kidneys: Kidneys are normal in size. There are no stones or hydronephrosis.    Gastrointestinal: Mild to moderate stool burden noted throughout the colon with a small to moderate-sized stool ball within the rectum. No acute inflammatory changes. No significant distention to suggest bowel obstruction. Mild diverticulosis of the   descending and sigmoid colon without evidence of acute diverticulitis. G-tube is in adequate position. Possible small lipoma within the greater curvature of the stomach.    Bladder: Circumferential bladder wall thickening    Pelvis:  No suspecious mass.    Peritoneum/Mesentery: No fluid collection, ascities, or free air.      Lymph Nodes: No lymphadenopathy.    Vasculature: Unremarkable    Abdominal Wall: Unremarkable    Bony Structures: No acute osseous abnormality. Unchanged grade 1 anterolisthesis of L4 and L5.          Impression:      Impression:  Findings consistent with multifocal pneumonia/pneumonitis, most significantly involving the lower lobes bilaterally.    No definite acute intra-abdominal intrapelvic abnormality.    Mild circumferential bladder wall thickening is nonspecific but may represent cystitis. Please correlate with urinalysis.    Evidence of possible fecal impaction with associated mild constipation.      Electronically Signed: Ruben Medina DO    9/11/2024 7:50 PM EDT    Workstation ID: [id number]    CT Abdomen Pelvis With Contrast [[id number]] Collected: 09/11/24 1942     Updated: 09/11/24 1953    Narrative:      CT CHEST W CONTRAST DIAGNOSTIC, CT ABDOMEN PELVIS W CONTRAST    Date of Exam: 9/11/2024 7:26 PM EDT    Indication: covid, fever.    Comparison: 3/14/2024    Technique: Axial CT images were obtained of the chest, abdomen and pelvis after the uneventful intravenous administration of 85 cc Isovue-300.  Reconstructed coronal and sagittal images were also obtained. Automated exposure control and iterative   construction methods were  used.      Findings:  CT chest:  Lower Neck: Unremarkable.    Hilum and Mediastinum: Scattered prominent mediastinal and perihilar lymph nodes. No pathologically enlarged lymph nodes.  Mild cardiomegaly. Trace pericardial effusion. The esophagus is mildly patulous. No definite suspicious mass.  The vasculature is grossly unremarkable.    Lung Parenchyma and Pleura: Patchy opacities scattered throughout the lungs, that are more consolidative in the lower lobes bilaterally. Trace bilateral pleural effusions. No pneumothorax.  The central airways are patent.    Soft tissues: Unremarkable.    Osseous structures: No acute osseous abnormality.. Mild cortical deformity of the superior endplate of T9 most likely represents a small Schmorl's node, unchanged.    CT abdomen and pelvis:  Liver: Liver is normal in size and CT density. No focal lesions.    Gallbladder: The gallbladder is normal without evidence of radiopaque stones.    Bile Ducts: No billiary dcutal dilation.    Spleen: Spleen is normal in size and CT density.    Pancreas: Pancreas is normal. There is no evidence of pancreatic mass or peripancreatic fluid.    Adrenals: Adrenal glands are unremarkable.    Kidneys: Kidneys are normal in size. There are no stones or hydronephrosis.    Gastrointestinal: Mild to moderate stool burden noted throughout the colon with a small to moderate-sized stool ball within the rectum. No acute inflammatory changes. No significant distention to suggest bowel obstruction. Mild diverticulosis of the   descending and sigmoid colon without evidence of acute diverticulitis. G-tube is in adequate position. Possible small lipoma within the greater curvature of the stomach.    Bladder: Circumferential bladder wall thickening    Pelvis:  No suspecious mass.    Peritoneum/Mesentery: No fluid collection, ascities, or free air.      Lymph Nodes: No lymphadenopathy.    Vasculature: Unremarkable    Abdominal Wall: Unremarkable    Bony Structures:  No acute osseous abnormality. Unchanged grade 1 anterolisthesis of L4 and L5.          Impression:      Impression:  Findings consistent with multifocal pneumonia/pneumonitis, most significantly involving the lower lobes bilaterally.    No definite acute intra-abdominal intrapelvic abnormality.    Mild circumferential bladder wall thickening is nonspecific but may represent cystitis. Please correlate with urinalysis.    Evidence of possible fecal impaction with associated mild constipation.      Electronically Signed: Ruben Medina DO    9/11/2024 7:50 PM EDT    Workstation ID: [id number]    CT Head Without Contrast [[id number]] Collected: 09/11/24 1940     Updated: 09/11/24 1945    Narrative:      CT HEAD WO CONTRAST    Date of Exam: 9/11/2024 7:26 PM EDT    Indication: possible sz.    Comparison: 3/11/2024    Technique: Axial CT images were obtained of the head without contrast administration.  Automated exposure control and iterative construction methods were used.      Findings:  There is no evidence of hemorrhage. There is no mass effect or midline shift. Diffuse brain atrophy with chronic microvascular ischemic changes    There is no extracerebral collection.    Ventricles are normal in size and configuration for patient's stated age.     Posterior fossa is within normal limits.    Calvarium and skull base appear intact. Mucosal thickening in the bilateral maxillary sinuses and ethmoids. Small air-fluid level left sphenoid. Stable chronic findings with possible surgical change in the right globe.        Impression:      Impression:  No acute intracranial abnormality        Electronically Signed: Aramis Clark MD    9/11/2024 7:42 PM EDT    Workstation ID: [id number]                    Results for orders placed during the hospital encounter of 01/15/24    Adult Transthoracic Echo Complete With Contrast if Necessary Per Protocol    Interpretation Summary    Left ventricular ejection fraction appears to be 56 -  60%.    Left ventricular wall thickness is consistent with hypertrophy.    Estimated right ventricular systolic pressure from tricuspid regurgitation is mildly elevated (35-45 mmHg).    There is a small (1-2cm) pericardial effusion.    No evidence for pericardial tamponade        Discharge Details        Discharge Medications        New Medications        Instructions Start Date   cefuroxime 500 MG tablet  Commonly known as: CEFTIN   500 mg, Oral, 2 Times Daily      dexAMETHasone 6 MG tablet  Commonly known as: DECADRON   6 mg, Per G Tube, Daily      linezolid 600 MG tablet  Commonly known as: ZYVOX   600 mg, Per G Tube, 2 Times Daily             Changes to Medications        Instructions Start Date   FLUoxetine 20 MG/5ML solution  Commonly known as: PROzac  What changed: See the new instructions.   20 mg, Oral, Daily, Resume once zyvox is finished             Continue These Medications        Instructions Start Date   acetaminophen 160 MG/5ML solution  Commonly known as: TYLENOL   650 mg, Per G Tube, Every 6 Hours PRN      albuterol (2.5 MG/3ML) 0.083% nebulizer solution  Commonly known as: PROVENTIL   2.5 mg, Nebulization, Every 6 Hours - RT      amLODIPine 10 MG tablet  Commonly known as: NORVASC   10 mg, Per G Tube, Every 24 Hours Scheduled      ARIPiprazole 5 MG tablet  Commonly known as: ABILIFY   5 mg, Per G Tube, Daily      BD AutoShield Duo 30G X 5 MM misc  Generic drug: Insulin Pen Needle       brimonidine 0.2 % ophthalmic solution  Commonly known as: ALPHAGAN   1 drop, Both Eyes, 3 Times Daily      carvedilol 12.5 MG tablet  Commonly known as: COREG   12.5 mg, Per G Tube, Every 12 Hours Scheduled      cloNIDine 0.2 MG tablet  Commonly known as: CATAPRES   0.2 mg, Per G Tube, Every 8 Hours Scheduled      hydrALAZINE 100 MG tablet  Commonly known as: APRESOLINE   100 mg, Per G Tube, Every 8 Hours Scheduled      hydrOXYzine 25 MG tablet  Commonly known as: ATARAX   25 mg, Per G Tube, 3 Times Daily PRN       insulin detemir 100 UNIT/ML injection  Commonly known as: LEVEMIR   6 Units, Subcutaneous, Every 12 Hours Scheduled      insulin regular 100 UNIT/ML injection  Commonly known as: humuLIN R,novoLIN R   2-9 Units, Subcutaneous, Every 6 Hours Scheduled      insulin regular 100 UNIT/ML injection  Commonly known as: humuLIN R,novoLIN R   3 Units, Subcutaneous, Every 6 Hours Scheduled      ipratropium-albuterol 0.5-2.5 mg/3 ml nebulizer  Commonly known as: DUO-NEB   3 mL, Nebulization, Every 4 Hours PRN      lansoprazole 30 MG Tablet Delayed Release Dispersible disintegrating tablet  Commonly known as: PREVACID SOLUTAB   30 mg, Nasogastric, Every Early Morning      PALLIATIVE CARE ORAL RINSE ([facility])   5 mL, Mouth/Throat, 4 Times Daily      ProSource No Carb liquid   30 mL, Per G Tube, 2 Times Daily      QUEtiapine 25 MG tablet  Commonly known as: SEROquel   25 mg, Per G Tube, Nightly      sennosides 8.8 MG/5ML syrup  Commonly known as: SENOKOT   10 mL, Per G Tube, Nightly PRN      terazosin 5 MG capsule  Commonly known as: HYTRIN   5 mg, Per G Tube, Every 12 Hours Scheduled      timolol 0.5 % ophthalmic solution  Commonly known as: TIMOPTIC   1 drop, Both Eyes, 2 Times Daily      torsemide 5 MG tablet  Commonly known as: DEMADEX   5 mg, Per G Tube, Daily      Valproic Acid 250 MG/5ML solution syrup  Commonly known as: DEPAKENE   150 mg, Nasogastric, Every 8 Hours Scheduled      Vitamin D3 50 MCG (2000 UT) capsule   VITAMIN D3 50 MCG (2000 UT) CAPS             Stop These Medications      nexIUM 40 MG packet  Generic drug: esomeprazole              No Known Allergies      Discharge Disposition:  Skilled Nursing Facility (DC - External)    Discharge Diet:  Diet Order   Procedures    Diet: Cardiac, Diabetic, Renal; Healthy Heart (2-3 Na+); Consistent Carbohydrate; Low Potassium; Texture: Soft to Chew (NDD 3); Soft to Chew: Chopped Meat; Fluid Consistency: Thin (IDDSI 0)         Discharge Activity:   Activity Instructions        Activity as Tolerated      Activity as Tolerated      Measure Blood Pressure      Up WIth Assist                CODE STATUS:    Code Status and Medical Interventions: CPR (Attempt to Resuscitate); Full Support; Cannot reach family, deferring to prior   Ordered at: 09/12/24 0003     Code Status (Patient has no pulse and is not breathing):    CPR (Attempt to Resuscitate)     Medical Interventions (Patient has pulse or is breathing):    Full Support     Comments:    Cannot reach family, deferring to prior         Future Appointments   Date Time Provider Department Center   9/18/2024  1:30 PM MED 13 [facility] None   12/2/2024  9:45 AM Tommy Mcpherson MD [facility]   12/2/2024  1:45 PM Farzana Easley MD [facility]       Additional Instructions for the Follow-ups that You Need to Schedule       Discharge Follow-up with PCP   As directed       Currently Documented PCP:    Alberto Dye MD    PCP Phone Number:    [phone]     Follow Up Details: follow up with pcp one week or facility provider in 2 days or sooner        Discharge Follow-up with PCP   As directed       Currently Documented PCP:    Alberto Dye MD    PCP Phone Number:    [phone]     Follow Up Details: Follow up in 1 week        Discharge Follow-up with Specified Provider: Dr Gordon; 1 Week   As directed      To: Dr Gordon   Follow Up: 1 Week                Time Spent on Discharge:  55 minutes    Electronically signed by NARGIS Newman, 09/18/24, 10:52 AM EDT.

## 2024-09-18 NOTE — PLAN OF CARE
Goal Outcome Evaluation:      Patient discharged to [facility] via  EMS. Report called to Melania at facility. PEG tube in place. Daughter at bedside prior to d/c

## 2024-09-18 NOTE — PLAN OF CARE
Goal Outcome Evaluation:                                              Problem: Adult Inpatient Plan of Care  Goal: Absence of Hospital-Acquired Illness or Injury  Intervention: Identify and Manage Fall Risk  Recent Flowsheet Documentation  Taken 9/18/2024 0200 by Melvin Suárez RN  Safety Promotion/Fall Prevention:   activity supervised   safety round/check completed   toileting scheduled  Taken 9/18/2024 0000 by Melvin Suárez RN  Safety Promotion/Fall Prevention:   activity supervised   safety round/check completed   toileting scheduled  Taken 9/17/2024 2200 by Melvin Suárez RN  Safety Promotion/Fall Prevention:   activity supervised   safety round/check completed   toileting scheduled  Taken 9/17/2024 2000 by Melvin Suárez RN  Safety Promotion/Fall Prevention:   activity supervised   safety round/check completed   toileting scheduled  Intervention: Prevent Skin Injury  Recent Flowsheet Documentation  Taken 9/18/2024 0200 by Melvin Suárez RN  Body Position:   left   side-lying  Skin Protection: adhesive use limited  Taken 9/18/2024 0000 by Melvin Suárez RN  Body Position:   right   side-lying  Skin Protection: adhesive use limited  Taken 9/17/2024 2200 by Melvin Suárez RN  Body Position: supine  Skin Protection: adhesive use limited  Taken 9/17/2024 2000 by Melvin Suárez RN  Body Position: supine  Skin Protection: adhesive use limited  Intervention: Prevent and Manage VTE (Venous Thromboembolism) Risk  Recent Flowsheet Documentation  Taken 9/18/2024 0000 by Melvin Suárez RN  VTE Prevention/Management:   bilateral   sequential compression devices on  Taken 9/17/2024 2200 by Melvin Suárez RN  VTE Prevention/Management:   bilateral   sequential compression devices on  Taken 9/17/2024 2000 by Melvin Suárez RN  VTE Prevention/Management:   bilateral   sequential compression devices on  Intervention: Prevent Infection  Recent Flowsheet Documentation  Taken 9/18/2024 0200 by Melvin Suárez  BREE RN  Infection Prevention: environmental surveillance performed  Taken 9/18/2024 0000 by Melvin Suárez RN  Infection Prevention: environmental surveillance performed  Taken 9/17/2024 2200 by Melvin Suárez RN  Infection Prevention: environmental surveillance performed  Taken 9/17/2024 2000 by Melvin Suárez RN  Infection Prevention: environmental surveillance performed  Goal: Optimal Comfort and Wellbeing  Intervention: Monitor Pain and Promote Comfort  Recent Flowsheet Documentation  Taken 9/18/2024 0200 by Melvin Suárez RN  Pain Management Interventions: quiet environment facilitated  Taken 9/18/2024 0000 by Melvin Suárez RN  Pain Management Interventions: quiet environment facilitated  Taken 9/17/2024 2200 by Melvin Suárez RN  Pain Management Interventions: quiet environment facilitated  Taken 9/17/2024 2000 by Melvin Suárez RN  Pain Management Interventions: quiet environment facilitated  Intervention: Provide Person-Centered Care  Recent Flowsheet Documentation  Taken 9/18/2024 0200 by Melvin Suárez RN  Trust Relationship/Rapport: care explained  Taken 9/18/2024 0000 by Melvin Suárez RN  Trust Relationship/Rapport: care explained  Taken 9/17/2024 2200 by Melvin Suárez RN  Trust Relationship/Rapport: care explained  Taken 9/17/2024 2000 by Melvin Suárez RN  Trust Relationship/Rapport: care explained    VSS, voids well, confused, awaiting DC, will continue to monitor for changes.

## 2024-09-18 NOTE — PROGRESS NOTES
[facility]    INFECTIOUS DISEASE PROGRESS NOTE    Omkar aNva  1955  [id number]    Date of consult: 9/12/2024    Admit date: 9/11/2024    Requesting Provider: No ref. provider found  Evaluating physician: Rayray Gordon MD  Reason for Consultation: Pneumonia, COVID-19  Chief Complaint: Above      Subjective   History of present illness:  Patient is a  69 y.o.  Yr old male with a history of dementia, diabetes mellitus type 2, blindness, essential hypertension, substance use in the past, nursing home resident at [facility], who was admitted to [facility] on 9/11/2024 with decreased mental status and acute hypoxic respiratory failure.  Radiographs revealed multifocal pneumonia consistent with possible aspiration and patient also tested positive for COVID-19.  Patient is a poor historian.  I was consulted on 9/12/2024 for further evaluation and treatment.    9/13/2024 history reviewed.  Remains confused.  Poor historian.  Tolerating antibiotics for COVID-19 and pneumonia.  No high fever.  Continues cefepime,, doxycycline, remdesivir, and adding oral linezolid given MRSA positive screen.    9/16/2024 history reviewed.  No high fevers or chills.  Confused off and on.  Tolerating antibiotics with cefepime, doxycycline, linezolid, remdesivir.  MRSA screen positive.  Not producing sputum.  COVID-19 +9/11.    9/17/2024 history reviewed.  Continues on antibiotics for bronchitis and pneumonia.  No high fever.  Poor historian.    9/18/2024 history reviewed.  Scheduled to return back to skilled facility today.  Poor historian.  No high fever.    Past Medical History:   Diagnosis Date    Anemia     Dementia     Diabetes mellitus     Dysphagia     GERD (gastroesophageal reflux disease)     History of alcohol abuse     History of cocaine use     History of marijuana use     Hypertension     Osteomyelitis     Poor historian     records obtained from nursing home records &  his family    Visual impairment        Past Surgical History:   Procedure Laterality Date    AMPUTATION FOOT Left 10/18/2022    Procedure: PARTIAL FIRST RAY AMPUTATION LEFT;  Surgeon: Yeison Petty MD;  Location:  [facility];  Service: Orthopedics;  Laterality: Left;    AMPUTATION FOOT Left 12/5/2022    Procedure: Transmetatarsal of Left Foot;  Surgeon: Yeison Petty MD;  Location:  [facility];  Service: Orthopedics;  Laterality: Left;    ENDOSCOPY N/A 3/14/2024    Procedure: ESOPHAGOGASTRODUODENOSCOPY WITH JEJUNAL TUBE INSERTION AT BEDSIDE;  Surgeon: Brunner, Mark I, MD;  Location:  [facility];  Service: Gastroenterology;  Laterality: N/A;    ENDOSCOPY W/ PEG TUBE PLACEMENT N/A 4/1/2024    Procedure: ESOPHAGOGASTRODUODENOSCOPY WITH PERCUTANEOUS ENDOSCOPIC GASTROSTOMY TUBE INSERTION;  Surgeon: Brunner, Mark I, MD;  Location:  [facility];  Service: Gastroenterology;  Laterality: N/A;  EGD with PEG placement.  Secured at 3.5 cm    EYE SURGERY         Pediatric History   Patient Parents    Not on file     Other Topics Concern    Not on file   Social History Narrative    Caffeine 0-1 servings per day    Patient lives at home .   Previous history of substance use including cocaine and alcohol, not currently, and no current history for cigarettes, nursing home resident    family history includes Diabetes in his brother, brother, father, maternal grandfather, maternal grandmother, mother, and sister; Hypertension in his brother, brother, father, and mother.    No Known Allergies    Immunization History   Administered Date(s) Administered    COVID-19 (MODERNA) 1st,2nd,3rd Dose Monovalent 06/09/2021, 07/19/2021    COVID-19 (MODERNA) Monovalent Original Booster 04/11/2022    COVID-19 F23 (MODERNA) 12YRS+ (SPIKEVAX) 10/16/2023    Fluzone (or Fluarix & Flulaval for VFC) >6mos 11/29/2023       Medication:  @Scheduled Meds:amLODIPine, 10 mg, Per G Tube, Q24H  ARIPiprazole, 5 mg, Per G Tube, Daily  brimonidine, 1 drop,  "Both Eyes, TID  carvedilol, 12.5 mg, Per G Tube, Q12H  cefepime, 2,000 mg, Intravenous, Q8H  cloNIDine, 0.2 mg, Per G Tube, Q8H  dexAMETHasone, 6 mg, Per G Tube, Daily   Or  dexAMETHasone, 6 mg, Intravenous, Daily  doxycycline, 100 mg, Per G Tube, Q12H  hydrALAZINE, 100 mg, Per G Tube, Q8H  insulin lispro, 2-7 Units, Subcutaneous, 4x Daily AC & at Bedtime  ipratropium-albuterol, 3 mL, Nebulization, Q6H - RT  lansoprazole, 30 mg, Per G Tube, Q AM  linezolid, 600 mg, Per PEG Tube, BID  palliative care oral rinse, 5 mL, Mouth/Throat, 4x Daily  QUEtiapine, 25 mg, Per G Tube, Nightly  sodium chloride, 10 mL, Intravenous, Q12H  terazosin, 5 mg, Per G Tube, Q12H  timolol, 1 drop, Both Eyes, BID  torsemide, 5 mg, Per G Tube, Daily  Valproic Acid, 150 mg, Per G Tube, Q8H      Continuous Infusions:     PRN Meds:.  acetaminophen    senna-docusate sodium **AND** polyethylene glycol **AND** [DISCONTINUED] bisacodyl **AND** bisacodyl    Calcium Replacement - Follow Nurse / BPA Driven Protocol    dextrose    glucagon (human recombinant)    hydrOXYzine    Magnesium Standard Dose Replacement - Follow Nurse / BPA Driven Protocol    Phosphorus Replacement - Follow Nurse / BPA Driven Protocol    Potassium Replacement - Follow Nurse / BPA Driven Protocol    sodium chloride    sodium chloride     Please refer to the medical record for a full medication list    Review of Systems: Difficult to obtain secondary to mental status      Physical Exam:   Vital Signs   Temp:  [96.6 °F (35.9 °C)-98.7 °F (37.1 °C)] 98.7 °F (37.1 °C)  Heart Rate:  [56-63] 61  Resp:  [16-18] 18  BP: (135-177)/(67-89) 135/82    Blood pressure 135/82, pulse 61, temperature 98.7 °F (37.1 °C), temperature source Oral, resp. rate 18, height 182.9 cm (72\"), weight 83.7 kg (184 lb 9.6 oz), SpO2 96%.  GENERAL: Awake and alert, in mild distress. Appears older than stated age.  Resting in bed.  Confused.  HEENT:  Normocephalic, atraumatic.  Oropharynx without thrush. Dentition " in good repair. No cervical adenopathy. No neck masses.  Ears externally normal, Nose externally normal.  EYES: No conjunctival injection. No icterus. EOM full.  LYMPHATICS: No lymphadenopathy of the neck or axillary or inguinal regions.   HEART: No murmur, gallop, or pericardial friction rub. Reg rate rhythm.  LUNGS: Few scattered crackles. No respiratory distress, no use of accessory muscles.  ABDOMEN: Soft, nontender, nondistended. No appreciable HSM.  Bowel sounds normal.  SKIN: Warm and dry without cutaneous eruptions.  No nodules.    PSYCHIATRIC: Mental status with confusion.  EXT:  No cellulitic change.  Normal range of motion  NEURO: Oriented to name, nonfocal except blindness.      Results Review:   I reviewed the patient's new clinical results.  I reviewed the patient's new imaging results and agree with the interpretation.  I reviewed the patient's other test results and agree with the interpretation    Results from last 7 days   Lab Units 09/16/24  1427 09/15/24  0657 09/14/24  0543   WBC 10*3/mm3 9.53 7.78 7.83   HEMOGLOBIN g/dL 8.6* 8.5* 8.4*   HEMATOCRIT % 26.4* 25.6* 25.6*   PLATELETS 10*3/mm3 190 164 170     Results from last 7 days   Lab Units 09/18/24  0510   SODIUM mmol/L 139   POTASSIUM mmol/L 4.3   CHLORIDE mmol/L 106   CO2 mmol/L 21.0*   BUN mg/dL 48*   CREATININE mg/dL 1.21   GLUCOSE mg/dL 261*   CALCIUM mg/dL 8.8     Results from last 7 days   Lab Units 09/18/24  0510   ALK PHOS U/L 117   BILIRUBIN mg/dL 0.2   ALT (SGPT) U/L 37   AST (SGOT) U/L 19     Results from last 7 days   Lab Units 09/11/24  1816   SED RATE mm/hr 27*     Results from last 7 days   Lab Units 09/14/24  0543   CRP mg/dL 4.89*         Results from last 7 days   Lab Units 09/11/24  1816   LACTATE mmol/L 0.5     Estimated Creatinine Clearance: 70.1 mL/min (by C-G formula based on SCr of 1.21 mg/dL).  CPK          9/13/2024    07:46   Common Labs   Creatine Kinase 308       Procalitonin Results:      Lab 09/11/24  1816  "  PROCALCITONIN 0.11      Brief Urine Lab Results  (Last result in the past 365 days)        Color   Clarity   Blood   Leuk Est   Nitrite   Protein   CREAT   Urine HCG        09/11/24 1905 Yellow   Clear   Negative   Trace   Negative   Trace                  No results found for: \"SITE\", \"ALLENTEST\", \"PHART\", \"BUV5DTZ\", \"PO2ART\", \"HKL7HUG\", \"BASEEXCESS\", \"P3WVLCJN\", \"HGBBG\", \"HCTABG\", \"OXYHEMOGLOBI\", \"METHHGBN\", \"CARBOXYHGB\", \"CO2CT\", \"BAROMETRIC\", \"MODALITY\", \"FIO2\"       Microbiology:      Results for orders placed or performed during the hospital encounter of 09/11/24   Blood Culture - Blood, Arm, Left    Specimen: Arm, Left; Blood   Result Value Ref Range    Blood Culture No growth at 5 days               Brief Urine Lab Results  (Last result in the past 365 days)        Color   Clarity   Blood   Leuk Est   Nitrite   Protein   CREAT   Urine HCG        09/11/24 1905 Yellow   Clear   Negative   Trace   Negative   Trace                     Radiology:  Imaging Results (Last 72 Hours)       ** No results found for the last 72 hours. **            IMPRESSION:     Pneumonia, likely aspiration plus possible secondary bacterial infection from COVID-19.  COVID-19 tested + 9/11/2024.  Organisms to consider for secondary bacterial infection include gram-negative rods, Streptococcus, Staphylococcus.  Does have a slightly increased risk for infection with ESBL organism but this was more distant.  Less likely atypical Legionella.  MRSA swab positive from 9/12.  Legionella and Streptococcus negative.  Improved.  COVID-19 infection + 9/11/2024.  In isolation until 9/21 and reassess.  Encephalopathy, toxic and metabolic related to above issues.  Ongoing.  Acute hypoxic respiratory failure, was on 3 L/min nasal cannula on 9/12.  2 L/min on 9/16.  Dementia, affecting all aspects of care.  Diabetes mellitus type 2 with increased risk for infection.  Blindness, affecting all aspects of care.  History of substance use with cocaine and " alcohol in the past.  Anemia, chronic disease and related to above issues.  Elevated ALT 78 on 9/12.  Related to above issues and medications.  Fever related to above issues.  Urinalysis benign from 9/11.  Resolved.    PLAN:    Diagnostically, continue to follow patient's physical exam, CBC, CMP, CRP.    Therapeutically, continue cefepime, linezolid (9/13-) given the MRSA positive, (trying to avoid vancomycin with renal failure) and doxycycline pending further culture data.  Discontinue eravacycline.  Cefepime can be associated with altered mental status in patients with renal failure and will need to be observed closely.  Duration of antibiotics to be determined.  Completed 5 days of remdesivir on 9/16.  Continue Decadron for 5 days depending upon clinical course.  Watch for drug interaction between brimonidine and linezolid.  Linezolid and cefepime should continue until 9/19.  De-escalate to linezolid and cefuroxime until 9/19 to facilitate discharge.  Then follow off antibiotics.  Supportive care.  3 L/min nasal cannula oxygen on 9/12/2024.  4 L nasal cannula 9/13.  3 L/min on 9/18.    I discussed the patient's findings and my recommendations with patient and nursing staff    Thank you for asking me to see Omkar Nava.  Our group would be pleased to follow this patient over the course of their hospitalization and assist with outpatient antimicrobial therapy, as indicated.  Further recommendations depend on the results of the cultures and clinical course.  Side effects of medications discussed.  The patient is at increased risk for adverse drug reactions, complications of IV access, and readmission.  Follow-up with primary care.  Very poor long-term prognosis.    Rayray Gordon MD  9/18/2024

## 2024-09-18 NOTE — CASE MANAGEMENT/SOCIAL WORK
Continued Stay Note  [facility]     Patient Name: Omkar Nava  MRN: [id number]  Today's Date: 9/18/2024    Admit Date: 9/11/2024    Plan: return to [facility]   Discharge Plan       Row Name 09/18/24 0845       Plan    Final Note Pt will return to [facility] for skilled care today at 1330 via [facility]. PCS has been sent. Report can be called to [phone]. IMM has been given.                   Discharge Codes    No documentation.                 Expected Discharge Date and Time       Expected Discharge Date Expected Discharge Time    Sep 18, 2024               Chely Almonte RN

## 2024-10-20 ENCOUNTER — APPOINTMENT (OUTPATIENT)
Dept: CT IMAGING | Facility: HOSPITAL | Age: 69
End: 2024-10-20
Payer: MEDICARE

## 2024-10-20 ENCOUNTER — HOSPITAL ENCOUNTER (INPATIENT)
Facility: HOSPITAL | Age: 69
LOS: 9 days | Discharge: NURSING, REHAB OR LONG-TERM CARE | End: 2024-10-31
Attending: EMERGENCY MEDICINE | Admitting: HOSPITALIST
Payer: MEDICARE

## 2024-10-20 DIAGNOSIS — R13.12 OROPHARYNGEAL DYSPHAGIA: ICD-10-CM

## 2024-10-20 DIAGNOSIS — J18.9 PNEUMONIA DUE TO INFECTIOUS ORGANISM, UNSPECIFIED LATERALITY, UNSPECIFIED PART OF LUNG: ICD-10-CM

## 2024-10-20 DIAGNOSIS — N30.00 ACUTE CYSTITIS WITHOUT HEMATURIA: Primary | ICD-10-CM

## 2024-10-20 PROBLEM — N39.0 UTI (URINARY TRACT INFECTION): Status: ACTIVE | Noted: 2024-10-20

## 2024-10-20 LAB
ALBUMIN SERPL-MCNC: 4 G/DL (ref 3.5–5.2)
ALBUMIN/GLOB SERPL: 1.1 G/DL
ALP SERPL-CCNC: 97 U/L (ref 39–117)
ALT SERPL W P-5'-P-CCNC: 31 U/L (ref 1–41)
ANION GAP SERPL CALCULATED.3IONS-SCNC: 11 MMOL/L (ref 5–15)
AST SERPL-CCNC: 22 U/L (ref 1–40)
BACTERIA UR QL AUTO: ABNORMAL /HPF
BASOPHILS # BLD AUTO: 0.01 10*3/MM3 (ref 0–0.2)
BASOPHILS NFR BLD AUTO: 0.1 % (ref 0–1.5)
BILIRUB SERPL-MCNC: <0.2 MG/DL (ref 0–1.2)
BILIRUB UR QL STRIP: NEGATIVE
BUN SERPL-MCNC: 40 MG/DL (ref 8–23)
BUN/CREAT SERPL: 30.3 (ref 7–25)
CALCIUM SPEC-SCNC: 9.6 MG/DL (ref 8.6–10.5)
CHLORIDE SERPL-SCNC: 110 MMOL/L (ref 98–107)
CLARITY UR: CLEAR
CO2 SERPL-SCNC: 25 MMOL/L (ref 22–29)
COLOR UR: YELLOW
CREAT SERPL-MCNC: 1.32 MG/DL (ref 0.76–1.27)
D-LACTATE SERPL-SCNC: 0.7 MMOL/L (ref 0.5–2)
DEPRECATED RDW RBC AUTO: 49.1 FL (ref 37–54)
EGFRCR SERPLBLD CKD-EPI 2021: 59.1 ML/MIN/1.73
EOSINOPHIL # BLD AUTO: 0.01 10*3/MM3 (ref 0–0.4)
EOSINOPHIL NFR BLD AUTO: 0.1 % (ref 0.3–6.2)
ERYTHROCYTE [DISTWIDTH] IN BLOOD BY AUTOMATED COUNT: 14.2 % (ref 12.3–15.4)
GLOBULIN UR ELPH-MCNC: 3.6 GM/DL
GLUCOSE SERPL-MCNC: 130 MG/DL (ref 65–99)
GLUCOSE UR STRIP-MCNC: NEGATIVE MG/DL
HCT VFR BLD AUTO: 25.3 % (ref 37.5–51)
HGB BLD-MCNC: 8.2 G/DL (ref 13–17.7)
HGB UR QL STRIP.AUTO: NEGATIVE
HOLD SPECIMEN: NORMAL
HYALINE CASTS UR QL AUTO: ABNORMAL /LPF
IMM GRANULOCYTES # BLD AUTO: 0.05 10*3/MM3 (ref 0–0.05)
IMM GRANULOCYTES NFR BLD AUTO: 0.6 % (ref 0–0.5)
KETONES UR QL STRIP: NEGATIVE
LEUKOCYTE ESTERASE UR QL STRIP.AUTO: NEGATIVE
LIPASE SERPL-CCNC: 18 U/L (ref 13–60)
LYMPHOCYTES # BLD AUTO: 0.99 10*3/MM3 (ref 0.7–3.1)
LYMPHOCYTES NFR BLD AUTO: 12 % (ref 19.6–45.3)
MCH RBC QN AUTO: 30.7 PG (ref 26.6–33)
MCHC RBC AUTO-ENTMCNC: 32.4 G/DL (ref 31.5–35.7)
MCV RBC AUTO: 94.8 FL (ref 79–97)
MONOCYTES # BLD AUTO: 0.43 10*3/MM3 (ref 0.1–0.9)
MONOCYTES NFR BLD AUTO: 5.2 % (ref 5–12)
NEUTROPHILS NFR BLD AUTO: 6.79 10*3/MM3 (ref 1.7–7)
NEUTROPHILS NFR BLD AUTO: 82 % (ref 42.7–76)
NITRITE UR QL STRIP: NEGATIVE
NRBC BLD AUTO-RTO: 0 /100 WBC (ref 0–0.2)
PH UR STRIP.AUTO: 5.5 [PH] (ref 5–8)
PLATELET # BLD AUTO: 232 10*3/MM3 (ref 140–450)
PMV BLD AUTO: 10.3 FL (ref 6–12)
POTASSIUM SERPL-SCNC: 5.5 MMOL/L (ref 3.5–5.2)
PROT SERPL-MCNC: 7.6 G/DL (ref 6–8.5)
PROT UR QL STRIP: ABNORMAL
RBC # BLD AUTO: 2.67 10*6/MM3 (ref 4.14–5.8)
RBC # UR STRIP: ABNORMAL /HPF
REF LAB TEST METHOD: ABNORMAL
SODIUM SERPL-SCNC: 146 MMOL/L (ref 136–145)
SP GR UR STRIP: 1.02 (ref 1–1.03)
SQUAMOUS #/AREA URNS HPF: ABNORMAL /HPF
UROBILINOGEN UR QL STRIP: ABNORMAL
WBC # UR STRIP: ABNORMAL /HPF
WBC NRBC COR # BLD AUTO: 8.28 10*3/MM3 (ref 3.4–10.8)
WHOLE BLOOD HOLD COAG: NORMAL
WHOLE BLOOD HOLD SPECIMEN: NORMAL

## 2024-10-20 PROCEDURE — 84145 PROCALCITONIN (PCT): CPT | Performed by: PEDIATRICS

## 2024-10-20 PROCEDURE — 74176 CT ABD & PELVIS W/O CONTRAST: CPT

## 2024-10-20 PROCEDURE — 85025 COMPLETE CBC W/AUTO DIFF WBC: CPT | Performed by: PHYSICIAN ASSISTANT

## 2024-10-20 PROCEDURE — P9612 CATHETERIZE FOR URINE SPEC: HCPCS

## 2024-10-20 PROCEDURE — 87086 URINE CULTURE/COLONY COUNT: CPT | Performed by: PHYSICIAN ASSISTANT

## 2024-10-20 PROCEDURE — 83690 ASSAY OF LIPASE: CPT | Performed by: PHYSICIAN ASSISTANT

## 2024-10-20 PROCEDURE — 83605 ASSAY OF LACTIC ACID: CPT | Performed by: PHYSICIAN ASSISTANT

## 2024-10-20 PROCEDURE — 87186 SC STD MICRODIL/AGAR DIL: CPT | Performed by: PHYSICIAN ASSISTANT

## 2024-10-20 PROCEDURE — 81001 URINALYSIS AUTO W/SCOPE: CPT | Performed by: PHYSICIAN ASSISTANT

## 2024-10-20 PROCEDURE — 36415 COLL VENOUS BLD VENIPUNCTURE: CPT

## 2024-10-20 PROCEDURE — 25810000003 SODIUM CHLORIDE 0.9 % SOLUTION: Performed by: PHYSICIAN ASSISTANT

## 2024-10-20 PROCEDURE — 71250 CT THORAX DX C-: CPT

## 2024-10-20 PROCEDURE — 80053 COMPREHEN METABOLIC PANEL: CPT | Performed by: PHYSICIAN ASSISTANT

## 2024-10-20 PROCEDURE — 87077 CULTURE AEROBIC IDENTIFY: CPT | Performed by: PHYSICIAN ASSISTANT

## 2024-10-20 PROCEDURE — 99285 EMERGENCY DEPT VISIT HI MDM: CPT

## 2024-10-20 RX ORDER — DOXYCYCLINE 100 MG/1
100 CAPSULE ORAL ONCE
Status: DISCONTINUED | OUTPATIENT
Start: 2024-10-20 | End: 2024-10-20

## 2024-10-20 RX ORDER — SODIUM CHLORIDE 0.9 % (FLUSH) 0.9 %
10 SYRINGE (ML) INJECTION AS NEEDED
Status: DISCONTINUED | OUTPATIENT
Start: 2024-10-20 | End: 2024-10-31 | Stop reason: HOSPADM

## 2024-10-20 RX ADMIN — SODIUM CHLORIDE 500 ML: 9 INJECTION, SOLUTION INTRAVENOUS at 21:42

## 2024-10-20 NOTE — LETTER
EMS Transport Request  For use at [facility], [place], [place], [place], and [place] only   Patient Name: Omkar Nava : 1955   Weight:77 kg (169 lb 12.8 oz) Pick-up Location: [facility] BLS/ALS: BLS/ALS: BLS   Insurance: MEDICARE Auth End Date:    Pre-Cert #: D/C Summary complete:    Destination: [facility]   Contact Precautions: None   Equipment (O2, Fluids, etc.): None   Arrive By Date/Time: 10/30/2024 Stretcher/WC: Stretcher   CM Requesting: Jaki Felton RN Ext: [phone]   Notes/Medical Necessity: Bed bound     ______________________________________________________________________    *Only 2 patient bags OR 1 carry-on size bag are permitted.  Wheelchairs and walkers CANNOT transported with the patient. Acknowledge: Yes

## 2024-10-20 NOTE — Clinical Note
Level of Care: Telemetry [5]   Diagnosis: UTI (urinary tract infection) [[id number]]   Admitting Physician: EMILY BILLS [[id number]]   Attending Physician: EMILY BILLS [[id number]]

## 2024-10-20 NOTE — ED PROVIDER NOTES
Subjective  History of Present Illness:    Chief Complaint: Abdominal pain  History of Present Illness: 69-year-old male brought to the emergency department via EMS for abdominal pain, he has a history of diabetes, dementia, GERD, history of alcohol cocaine abuse, hypertension, osteomyelitis  Onset: Gradual  Duration: Symptoms started last night  Exacerbating / Alleviating factors: Pain is persistent  Associated symptoms: Nausea vomiting      Nurses Notes reviewed and agree, including vitals, allergies, social history and prior medical history.     REVIEW OF SYSTEMS: All systems reviewed and not pertinent unless noted.    Review of Systems   Gastrointestinal:  Positive for abdominal pain.   All other systems reviewed and are negative.      Past Medical History:   Diagnosis Date    Anemia     Dementia     Diabetes mellitus     Dysphagia     GERD (gastroesophageal reflux disease)     History of alcohol abuse     History of cocaine use     History of marijuana use     Hypertension     Osteomyelitis     Poor historian     records obtained from nursing home records & his family    Visual impairment        Allergies:    Patient has no known allergies.      Past Surgical History:   Procedure Laterality Date    AMPUTATION FOOT Left 10/18/2022    Procedure: PARTIAL FIRST RAY AMPUTATION LEFT;  Surgeon: Yeison Petty MD;  Location:  [facility];  Service: Orthopedics;  Laterality: Left;    AMPUTATION FOOT Left 12/5/2022    Procedure: Transmetatarsal of Left Foot;  Surgeon: Yeison Petty MD;  Location:  [facility];  Service: Orthopedics;  Laterality: Left;    ENDOSCOPY N/A 3/14/2024    Procedure: ESOPHAGOGASTRODUODENOSCOPY WITH JEJUNAL TUBE INSERTION AT BEDSIDE;  Surgeon: Brunner, Mark I, MD;  Location:  [facility];  Service: Gastroenterology;  Laterality: N/A;    ENDOSCOPY W/ PEG TUBE PLACEMENT N/A 4/1/2024    Procedure: ESOPHAGOGASTRODUODENOSCOPY WITH PERCUTANEOUS ENDOSCOPIC GASTROSTOMY TUBE INSERTION;  Surgeon: Brunner,  "Cj SUAREZ MD;  Location: [facility];  Service: Gastroenterology;  Laterality: N/A;  EGD with PEG placement.  Secured at 3.5 cm    EYE SURGERY           Social History     Socioeconomic History    Marital status: Single   Tobacco Use    Smoking status: Former     Current packs/day: 0.00     Average packs/day: 1 pack/day for 40.0 years (40.0 ttl pk-yrs)     Types: Cigarettes     Start date: 1977     Quit date: 2017     Years since quittin.4     Passive exposure: Past    Smokeless tobacco: Never   Vaping Use    Vaping status: Never Used   Substance and Sexual Activity    Alcohol use: Not Currently     Comment: histgry of alcohol abuse    Drug use: Yes     Types: Marijuana, \"Crack\" cocaine     Comment: PATIENT STATES \"IT'S BEEN A FEW DAYS\"    Sexual activity: Defer         Family History   Problem Relation Age of Onset    Diabetes Mother     Hypertension Mother     Hypertension Father     Diabetes Father     Diabetes Sister     Hypertension Brother     Diabetes Brother     Diabetes Maternal Grandmother     Diabetes Maternal Grandfather     Hypertension Brother     Diabetes Brother        Objective  Physical Exam:  /66   Pulse 71   Temp 98.5 °F (36.9 °C) (Axillary)   Resp 17   Ht 172.7 cm (68\")   Wt 86.2 kg (190 lb)   SpO2 98%   BMI 28.89 kg/m²      Physical Exam  Vitals and nursing note reviewed.   Constitutional:       Appearance: He is well-developed.   HENT:      Head: Normocephalic and atraumatic.      Mouth/Throat:      Mouth: Mucous membranes are moist.   Eyes:      Extraocular Movements: Extraocular movements intact.   Cardiovascular:      Rate and Rhythm: Normal rate and regular rhythm.   Pulmonary:      Effort: Pulmonary effort is normal.      Breath sounds: Normal breath sounds.   Abdominal:      Palpations: Abdomen is soft.   Musculoskeletal:         General: Normal range of motion.      Cervical back: Normal range of motion.   Skin:     General: Skin is warm and dry. "   Neurological:      Mental Status: He is alert.   Psychiatric:         Mood and Affect: Mood normal.         Thought Content: Thought content normal.         Judgment: Judgment normal.           Procedures    ED Course:    ED Course as of 10/20/24 2338   Sun Oct 20, 2024   2130 Review of previous  non ED visits, prior labs, prior imaging, available notes from prior evaluations or visits with specialists, medication list, allergies, past medical history, past surgical history     [CS]   2130 I Personally reviewed all laboratory studies performed in the emergency department  [CS]   2132 BUN(!): 40 [CS]   2132 Creatinine(!): 1.32 [CS]   2132 Potassium(!): 5.5 [CS]   2201 WBC, UA(!): 6-10 [CS]   2201 Bacteria, UA(!): 4+ [CS]   2201 Protein, UA(!): 100 mg/dL (2+) [CS]      ED Course User Index  [CS] Clive Atkins Jr., ISAAC       Lab Results (last 24 hours)       Procedure Component Value Units Date/Time    CBC & Differential [[id number]]  (Abnormal) Collected: 10/20/24 2049    Specimen: Blood Updated: 10/20/24 2055    Narrative:      The following orders were created for panel order CBC & Differential.  Procedure                               Abnormality         Status                     ---------                               -----------         ------                     CBC Auto Differential[[id number]]        Abnormal            Final result                 Please view results for these tests on the individual orders.    Comprehensive Metabolic Panel [[id number]]  (Abnormal) Collected: 10/20/24 2049    Specimen: Blood Updated: 10/20/24 2110     Glucose 130 mg/dL      BUN 40 mg/dL      Creatinine 1.32 mg/dL      Sodium 146 mmol/L      Potassium 5.5 mmol/L      Chloride 110 mmol/L      CO2 25.0 mmol/L      Calcium 9.6 mg/dL      Total Protein 7.6 g/dL      Albumin 4.0 g/dL      ALT (SGPT) 31 U/L      AST (SGOT) 22 U/L      Alkaline Phosphatase 97 U/L      Total Bilirubin <0.2 mg/dL      Globulin 3.6 gm/dL       Comment: Calculated Result        A/G Ratio 1.1 g/dL      BUN/Creatinine Ratio 30.3     Anion Gap 11.0 mmol/L      eGFR 59.1 mL/min/1.73     Narrative:      GFR Normal >60  Chronic Kidney Disease <60  Kidney Failure <15      Lipase [[id number]]  (Normal) Collected: 10/20/24 2049    Specimen: Blood Updated: 10/20/24 2110     Lipase 18 U/L     Lactic Acid, Plasma [[id number]]  (Normal) Collected: 10/20/24 2049    Specimen: Blood Updated: 10/20/24 2105     Lactate 0.7 mmol/L      Comment: Falsely depressed results may occur on samples drawn from patients receiving N-Acetylcysteine (NAC) or Metamizole.       CBC Auto Differential [[id number]]  (Abnormal) Collected: 10/20/24 2049    Specimen: Blood Updated: 10/20/24 2055     WBC 8.28 10*3/mm3      RBC 2.67 10*6/mm3      Hemoglobin 8.2 g/dL      Hematocrit 25.3 %      MCV 94.8 fL      MCH 30.7 pg      MCHC 32.4 g/dL      RDW 14.2 %      RDW-SD 49.1 fl      MPV 10.3 fL      Platelets 232 10*3/mm3      Neutrophil % 82.0 %      Lymphocyte % 12.0 %      Monocyte % 5.2 %      Eosinophil % 0.1 %      Basophil % 0.1 %      Immature Grans % 0.6 %      Neutrophils, Absolute 6.79 10*3/mm3      Lymphocytes, Absolute 0.99 10*3/mm3      Monocytes, Absolute 0.43 10*3/mm3      Eosinophils, Absolute 0.01 10*3/mm3      Basophils, Absolute 0.01 10*3/mm3      Immature Grans, Absolute 0.05 10*3/mm3      nRBC 0.0 /100 WBC     Urinalysis With Microscopic If Indicated (No Culture) - Straight Cath [[id number]]  (Abnormal) Collected: 10/20/24 2123    Specimen: Urine from Straight Cath Updated: 10/20/24 2151     Color, UA Yellow     Appearance, UA Clear     pH, UA 5.5     Specific Gravity, UA 1.019     Glucose, UA Negative     Ketones, UA Negative     Bilirubin, UA Negative     Blood, UA Negative     Protein,  mg/dL (2+)     Leuk Esterase, UA Negative     Nitrite, UA Negative     Urobilinogen, UA 0.2 E.U./dL    Urinalysis, Microscopic Only - Straight Cath [[id number]]  (Abnormal) Collected:  10/20/24 2123    Specimen: Urine from Straight Cath Updated: 10/20/24 2200     RBC, UA None Seen /HPF      WBC, UA 6-10 /HPF      Bacteria, UA 4+ /HPF      Squamous Epithelial Cells, UA 0-2 /HPF      Hyaline Casts, UA 0-6 /LPF      Methodology Manual Light Microscopy    Urine Culture - Urine, Straight Cath [[id number]] Collected: 10/20/24 2123    Specimen: Urine from Straight Cath Updated: 10/20/24 2253             CT Chest Without Contrast Diagnostic    Result Date: 10/20/2024  CT CHEST WO CONTRAST DIAGNOSTIC Date of Exam: 10/20/2024 9:40 PM EDT Indication: abdominal pain. Comparison: 9/11/2024. Technique: Axial CT images were obtained of the chest without contrast administration.  Reconstructed coronal and sagittal images were also obtained. Automated exposure control and iterative construction methods were used. Findings: There is a subcentimeter low-density nodule in the right thyroid lobe, which is unchanged. There are calcifications in the left thyroid lobe. There are no specific follow-up recommendations. The trachea, esophagus appear within normal limits. The heart is mildly enlarged. There are mild coronary artery calcifications. Small pericardial effusion is present. Mild aortic atherosclerosis. No mediastinal lymphadenopathy. There are persistent findings of interlobular septal thickening in the lung bases dependently along with bibasilar dependent atelectasis. These changes could be due to hypoinflation, mild interstitial pulmonary edema or atypical pneumonia. The airways are patent. No suspicious lung nodules. No acute findings in the superficial soft tissues. Findings in the abdomen discussed in a separate report. No acute osseous abnormality or destructive bone lesion. There are mild lower cervical and diffuse thoracic degenerative changes.     Impression: Impression: 1.Persistent findings of interlobular septal thickening in the lung bases with bibasilar dependent atelectasis. This could be due to  hypoinflation, mild interstitial pulmonary edema or atypical pneumonia. 2.Mild cardiomegaly and small pericardial effusion. 3.Coronary artery disease. Electronically Signed: Rayray Mcclain MD  10/20/2024 10:44 PM EDT  Workstation ID: [id number]    CT Abdomen Pelvis Without Contrast    Result Date: 10/20/2024  CT ABDOMEN PELVIS WO CONTRAST Date of Exam: 10/20/2024 9:40 PM EDT Indication: abdominal pain. Comparison: 9/11/2024. Technique: Axial CT images were obtained of the abdomen and pelvis without the administration of contrast. Reconstructed coronal and sagittal images were also obtained. Automated exposure control and iterative construction methods were used. Findings: There is stable interstitial edema in the lung bases with dependent bibasilar atelectasis. The heart is enlarged. There is diminished attenuation of the blood pool suggesting anemia. Coronary artery calcifications are partially seen. There is a percutaneous gastrostomy tube with tip in the stomach. No acute findings in the superficial soft tissues. No acute osseous abnormality or destructive bone lesion. There is 6 mm anterior spondylolisthesis of L4 on L5 and there is severe facet arthritis at  L4-L5 along with moderate disc degeneration. There are mild degenerative changes at the remainder of the lumbar segments. Mild DJD at both hips. The liver, gallbladder, bile ducts, pancreas, stomach, duodenum and spleen appear within normal limits. No adrenal mass. Kidneys are homogeneous. No hydronephrosis. No urolithiasis. Prostate gland is normal size. There is wall thickening throughout the urinary bladder, which appears more than expected from under distention. Correlate for cystitis. The appendix is normal. There is distal colonic diverticulosis. No small bowel distention. Mild aortoiliac atherosclerosis. No aneurysm. No ascites, pneumoperitoneum or lymphadenopathy.     Impression: Impression: 1.Wall thickening of the urinary bladder, which appears more  than expected from under distention. Correlate for cystitis. 2.Cardiomegaly, coronary artery disease and mild interstitial edema in the lung bases. 3.Percutaneous gastrostomy tube in place. 4.Colonic diverticulosis. 5.Lumbar degenerative changes with grade 1 anterior spondylolisthesis of L4 on L5. Electronically Signed: Rayray Mcclain MD  10/20/2024 10:42 PM EDT  Workstation ID: [id number]                [unfilled]              [unfilled]                        @[email or web address]@                    Medical Decision Making  Problems Addressed:  Acute cystitis without hematuria: complicated acute illness or injury  Pneumonia due to infectious organism, unspecified laterality, unspecified part of lung: complicated acute illness or injury     Details: adm    Amount and/or Complexity of Data Reviewed  Independent Historian: caregiver  External Data Reviewed: labs, radiology and notes.     Details: Review of previous  non ED visits, prior labs, prior imaging, available notes from prior evaluations or visits with specialists, medication list, allergies, past medical history, past surgical history      Labs: ordered. Decision-making details documented in ED Course.     Details: I Personally reviewed all laboratory studies performed in the emergency department   Radiology: ordered.    Risk  Prescription drug management.  Decision regarding hospitalization.          Final diagnoses:   Acute cystitis without hematuria   Pneumonia due to infectious organism, unspecified laterality, unspecified part of lung           Disposition admission       Clive Atkins Jr., PAYoliC  10/20/24 5509

## 2024-10-21 PROBLEM — M86.172 OTHER ACUTE OSTEOMYELITIS, LEFT ANKLE AND FOOT: Status: RESOLVED | Noted: 2022-10-05 | Resolved: 2024-10-21

## 2024-10-21 PROBLEM — A48.0 GAS GANGRENE: Status: RESOLVED | Noted: 2022-11-06 | Resolved: 2024-10-21
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
## 2024-10-21 PROBLEM — T14.8XXA WOUND INFECTION, POSTTRAUMATIC: Status: RESOLVED | Noted: 2022-09-26 | Resolved: 2024-10-21

## 2024-10-21 PROBLEM — L03.032 CELLULITIS OF LEFT TOE: Status: RESOLVED | Noted: 2022-11-06 | Resolved: 2024-10-21

## 2024-10-21 PROBLEM — I95.1 ORTHOSTATIC HYPOTENSION: Status: RESOLVED | Noted: 2017-05-10 | Resolved: 2024-10-21

## 2024-10-21 PROBLEM — R07.2 PRECORDIAL PAIN: Status: RESOLVED | Noted: 2017-05-10 | Resolved: 2024-10-21

## 2024-10-21 PROBLEM — U07.1 COVID-19 VIRUS DETECTED: Status: RESOLVED | Noted: 2024-09-12 | Resolved: 2024-10-21

## 2024-10-21 PROBLEM — L03.119 CELLULITIS OF LOWER EXTREMITY: Status: RESOLVED | Noted: 2022-11-06 | Resolved: 2024-10-21

## 2024-10-21 PROBLEM — J18.9 BILATERAL PNEUMONIA: Status: RESOLVED | Noted: 2024-02-05 | Resolved: 2024-10-21

## 2024-10-21 PROBLEM — J15.0 KLEBSIELLA PNEUMONIA: Status: RESOLVED | Noted: 2022-11-06 | Resolved: 2024-10-21

## 2024-10-21 PROBLEM — L08.9 WOUND INFECTION, POSTTRAUMATIC: Status: RESOLVED | Noted: 2022-09-26 | Resolved: 2024-10-21

## 2024-10-21 PROBLEM — N18.9 ACUTE KIDNEY INJURY SUPERIMPOSED ON CHRONIC KIDNEY DISEASE: Status: RESOLVED | Noted: 2024-03-10 | Resolved: 2024-10-21

## 2024-10-21 PROBLEM — R09.02 HYPOXIA: Status: RESOLVED | Noted: 2024-01-15 | Resolved: 2024-10-21

## 2024-10-21 PROBLEM — G93.40 ENCEPHALOPATHY: Status: RESOLVED | Noted: 2024-02-05 | Resolved: 2024-10-21

## 2024-10-21 PROBLEM — R11.10 VOMITING: Status: ACTIVE | Noted: 2024-10-21

## 2024-10-21 PROBLEM — N17.9 ACUTE KIDNEY INJURY SUPERIMPOSED ON CHRONIC KIDNEY DISEASE: Status: RESOLVED | Noted: 2024-03-10 | Resolved: 2024-10-21

## 2024-10-21 LAB
ANION GAP SERPL CALCULATED.3IONS-SCNC: 10 MMOL/L (ref 5–15)
BUN SERPL-MCNC: 30 MG/DL (ref 8–23)
BUN/CREAT SERPL: 26.8 (ref 7–25)
CALCIUM SPEC-SCNC: 9.5 MG/DL (ref 8.6–10.5)
CHLORIDE SERPL-SCNC: 110 MMOL/L (ref 98–107)
CO2 SERPL-SCNC: 23 MMOL/L (ref 22–29)
CREAT SERPL-MCNC: 1.12 MG/DL (ref 0.76–1.27)
EGFRCR SERPLBLD CKD-EPI 2021: 72 ML/MIN/1.73
GLUCOSE BLDC GLUCOMTR-MCNC: 121 MG/DL (ref 70–130)
GLUCOSE BLDC GLUCOMTR-MCNC: 126 MG/DL (ref 70–130)
GLUCOSE BLDC GLUCOMTR-MCNC: 137 MG/DL (ref 70–130)
GLUCOSE BLDC GLUCOMTR-MCNC: 171 MG/DL (ref 70–130)
GLUCOSE SERPL-MCNC: 138 MG/DL (ref 65–99)
POTASSIUM SERPL-SCNC: 5 MMOL/L (ref 3.5–5.2)
PROCALCITONIN SERPL-MCNC: 0.13 NG/ML (ref 0–0.25)
SODIUM SERPL-SCNC: 143 MMOL/L (ref 136–145)
VALPROATE SERPL-MCNC: 8.7 MCG/ML (ref 50–125)

## 2024-10-21 PROCEDURE — 99222 1ST HOSP IP/OBS MODERATE 55: CPT | Performed by: PEDIATRICS

## 2024-10-21 PROCEDURE — 94799 UNLISTED PULMONARY SVC/PX: CPT

## 2024-10-21 PROCEDURE — 25010000002 CEFTRIAXONE PER 250 MG: Performed by: PEDIATRICS

## 2024-10-21 PROCEDURE — 25010000002 ENOXAPARIN PER 10 MG: Performed by: PEDIATRICS

## 2024-10-21 PROCEDURE — 94640 AIRWAY INHALATION TREATMENT: CPT

## 2024-10-21 PROCEDURE — 92610 EVALUATE SWALLOWING FUNCTION: CPT | Performed by: SPEECH-LANGUAGE PATHOLOGIST

## 2024-10-21 PROCEDURE — G0378 HOSPITAL OBSERVATION PER HR: HCPCS

## 2024-10-21 PROCEDURE — 82948 REAGENT STRIP/BLOOD GLUCOSE: CPT

## 2024-10-21 PROCEDURE — 94664 DEMO&/EVAL PT USE INHALER: CPT

## 2024-10-21 PROCEDURE — 80048 BASIC METABOLIC PNL TOTAL CA: CPT | Performed by: PEDIATRICS

## 2024-10-21 PROCEDURE — 25810000003 SODIUM CHLORIDE 0.9 % SOLUTION: Performed by: PEDIATRICS

## 2024-10-21 PROCEDURE — 80164 ASSAY DIPROPYLACETIC ACD TOT: CPT | Performed by: PEDIATRICS

## 2024-10-21 PROCEDURE — 87040 BLOOD CULTURE FOR BACTERIA: CPT | Performed by: PHYSICIAN ASSISTANT

## 2024-10-21 PROCEDURE — 94761 N-INVAS EAR/PLS OXIMETRY MLT: CPT

## 2024-10-21 RX ORDER — AMLODIPINE BESYLATE 10 MG/1
10 TABLET ORAL
Status: DISCONTINUED | OUTPATIENT
Start: 2024-10-21 | End: 2024-10-31 | Stop reason: HOSPADM

## 2024-10-21 RX ORDER — CARVEDILOL 12.5 MG/1
12.5 TABLET ORAL EVERY 12 HOURS SCHEDULED
Status: DISCONTINUED | OUTPATIENT
Start: 2024-10-21 | End: 2024-10-31 | Stop reason: HOSPADM

## 2024-10-21 RX ORDER — BISACODYL 10 MG
10 SUPPOSITORY, RECTAL RECTAL DAILY PRN
Status: DISCONTINUED | OUTPATIENT
Start: 2024-10-21 | End: 2024-10-31 | Stop reason: HOSPADM

## 2024-10-21 RX ORDER — TIMOLOL MALEATE 5 MG/ML
1 SOLUTION/ DROPS OPHTHALMIC 2 TIMES DAILY
Status: DISCONTINUED | OUTPATIENT
Start: 2024-10-21 | End: 2024-10-31 | Stop reason: HOSPADM

## 2024-10-21 RX ORDER — FLUOXETINE 20 MG/5ML
20 SOLUTION ORAL DAILY
Status: DISCONTINUED | OUTPATIENT
Start: 2024-10-21 | End: 2024-10-22

## 2024-10-21 RX ORDER — SODIUM CHLORIDE 0.9 % (FLUSH) 0.9 %
10 SYRINGE (ML) INJECTION EVERY 12 HOURS SCHEDULED
Status: DISCONTINUED | OUTPATIENT
Start: 2024-10-21 | End: 2024-10-31 | Stop reason: HOSPADM

## 2024-10-21 RX ORDER — CLONIDINE HYDROCHLORIDE 0.1 MG/1
0.2 TABLET ORAL EVERY 8 HOURS SCHEDULED
Status: DISCONTINUED | OUTPATIENT
Start: 2024-10-21 | End: 2024-10-31 | Stop reason: HOSPADM

## 2024-10-21 RX ORDER — ENOXAPARIN SODIUM 100 MG/ML
40 INJECTION SUBCUTANEOUS DAILY
Status: DISCONTINUED | OUTPATIENT
Start: 2024-10-21 | End: 2024-10-31 | Stop reason: HOSPADM

## 2024-10-21 RX ORDER — HYDROXYZINE HYDROCHLORIDE 25 MG/1
25 TABLET, FILM COATED ORAL 3 TIMES DAILY PRN
Status: DISCONTINUED | OUTPATIENT
Start: 2024-10-21 | End: 2024-10-31 | Stop reason: HOSPADM

## 2024-10-21 RX ORDER — AMOXICILLIN 250 MG
2 CAPSULE ORAL 2 TIMES DAILY PRN
Status: DISCONTINUED | OUTPATIENT
Start: 2024-10-21 | End: 2024-10-31 | Stop reason: HOSPADM

## 2024-10-21 RX ORDER — TERAZOSIN 5 MG/1
5 CAPSULE ORAL EVERY 12 HOURS SCHEDULED
Status: DISCONTINUED | OUTPATIENT
Start: 2024-10-21 | End: 2024-10-31 | Stop reason: HOSPADM

## 2024-10-21 RX ORDER — SODIUM CHLORIDE 9 MG/ML
50 INJECTION, SOLUTION INTRAVENOUS CONTINUOUS
Status: ACTIVE | OUTPATIENT
Start: 2024-10-21 | End: 2024-10-21

## 2024-10-21 RX ORDER — ALBUTEROL SULFATE 0.83 MG/ML
2.5 SOLUTION RESPIRATORY (INHALATION)
Status: DISCONTINUED | OUTPATIENT
Start: 2024-10-21 | End: 2024-10-31 | Stop reason: HOSPADM

## 2024-10-21 RX ORDER — DEXTROSE MONOHYDRATE 25 G/50ML
25 INJECTION, SOLUTION INTRAVENOUS
Status: DISCONTINUED | OUTPATIENT
Start: 2024-10-21 | End: 2024-10-31 | Stop reason: HOSPADM

## 2024-10-21 RX ORDER — BISACODYL 5 MG/1
5 TABLET, DELAYED RELEASE ORAL DAILY PRN
Status: DISCONTINUED | OUTPATIENT
Start: 2024-10-21 | End: 2024-10-21 | Stop reason: SDUPTHER

## 2024-10-21 RX ORDER — VALPROIC ACID 250 MG/5ML
150 SOLUTION ORAL EVERY 8 HOURS SCHEDULED
Status: DISCONTINUED | OUTPATIENT
Start: 2024-10-21 | End: 2024-10-31 | Stop reason: HOSPADM

## 2024-10-21 RX ORDER — ACETAMINOPHEN 160 MG/5ML
650 SOLUTION ORAL EVERY 4 HOURS PRN
Status: DISCONTINUED | OUTPATIENT
Start: 2024-10-21 | End: 2024-10-31 | Stop reason: HOSPADM

## 2024-10-21 RX ORDER — AMINO ACIDS/PROTEIN HYDROLYS 11G-40/45
30 LIQUID IN PACKET (ML) ORAL 2 TIMES DAILY
Status: DISCONTINUED | OUTPATIENT
Start: 2024-10-21 | End: 2024-10-21

## 2024-10-21 RX ORDER — ACETAMINOPHEN 650 MG/1
650 SUPPOSITORY RECTAL EVERY 4 HOURS PRN
Status: DISCONTINUED | OUTPATIENT
Start: 2024-10-21 | End: 2024-10-31 | Stop reason: HOSPADM

## 2024-10-21 RX ORDER — ONDANSETRON 4 MG/1
4 TABLET, ORALLY DISINTEGRATING ORAL EVERY 6 HOURS PRN
Status: DISCONTINUED | OUTPATIENT
Start: 2024-10-21 | End: 2024-10-21 | Stop reason: ALTCHOICE

## 2024-10-21 RX ORDER — NICOTINE POLACRILEX 4 MG
15 LOZENGE BUCCAL
Status: DISCONTINUED | OUTPATIENT
Start: 2024-10-21 | End: 2024-10-22

## 2024-10-21 RX ORDER — ACETAMINOPHEN 325 MG/1
650 TABLET ORAL EVERY 4 HOURS PRN
Status: DISCONTINUED | OUTPATIENT
Start: 2024-10-21 | End: 2024-10-31 | Stop reason: HOSPADM

## 2024-10-21 RX ORDER — BRIMONIDINE TARTRATE 2 MG/ML
1 SOLUTION/ DROPS OPHTHALMIC 3 TIMES DAILY
Status: DISCONTINUED | OUTPATIENT
Start: 2024-10-21 | End: 2024-10-31 | Stop reason: HOSPADM

## 2024-10-21 RX ORDER — POLYETHYLENE GLYCOL 3350 17 G/17G
17 POWDER, FOR SOLUTION ORAL DAILY PRN
Status: DISCONTINUED | OUTPATIENT
Start: 2024-10-21 | End: 2024-10-31 | Stop reason: HOSPADM

## 2024-10-21 RX ORDER — SODIUM CHLORIDE 0.9 % (FLUSH) 0.9 %
10 SYRINGE (ML) INJECTION AS NEEDED
Status: DISCONTINUED | OUTPATIENT
Start: 2024-10-21 | End: 2024-10-31 | Stop reason: HOSPADM

## 2024-10-21 RX ORDER — ARIPIPRAZOLE 5 MG/1
5 TABLET ORAL DAILY
Status: DISCONTINUED | OUTPATIENT
Start: 2024-10-21 | End: 2024-10-31 | Stop reason: HOSPADM

## 2024-10-21 RX ORDER — ONDANSETRON 2 MG/ML
4 INJECTION INTRAMUSCULAR; INTRAVENOUS EVERY 6 HOURS PRN
Status: DISCONTINUED | OUTPATIENT
Start: 2024-10-21 | End: 2024-10-31 | Stop reason: HOSPADM

## 2024-10-21 RX ORDER — HYDRALAZINE HYDROCHLORIDE 50 MG/1
100 TABLET, FILM COATED ORAL EVERY 8 HOURS SCHEDULED
Status: DISCONTINUED | OUTPATIENT
Start: 2024-10-21 | End: 2024-10-31 | Stop reason: HOSPADM

## 2024-10-21 RX ORDER — NITROGLYCERIN 0.4 MG/1
0.4 TABLET SUBLINGUAL
Status: DISCONTINUED | OUTPATIENT
Start: 2024-10-21 | End: 2024-10-31 | Stop reason: HOSPADM

## 2024-10-21 RX ORDER — QUETIAPINE FUMARATE 25 MG/1
25 TABLET, FILM COATED ORAL NIGHTLY
Status: DISCONTINUED | OUTPATIENT
Start: 2024-10-21 | End: 2024-10-31 | Stop reason: HOSPADM

## 2024-10-21 RX ORDER — ACETAMINOPHEN 160 MG/5ML
650 SOLUTION ORAL EVERY 6 HOURS PRN
Status: DISCONTINUED | OUTPATIENT
Start: 2024-10-21 | End: 2024-10-21 | Stop reason: SDUPTHER

## 2024-10-21 RX ORDER — IBUPROFEN 600 MG/1
1 TABLET ORAL
Status: DISCONTINUED | OUTPATIENT
Start: 2024-10-21 | End: 2024-10-31 | Stop reason: HOSPADM

## 2024-10-21 RX ADMIN — AMLODIPINE BESYLATE 10 MG: 10 TABLET ORAL at 13:01

## 2024-10-21 RX ADMIN — Medication 10 ML: at 22:18

## 2024-10-21 RX ADMIN — ARIPIPRAZOLE 5 MG: 5 TABLET ORAL at 13:01

## 2024-10-21 RX ADMIN — VALPROIC ACID 150 MG: 250 SOLUTION ORAL at 13:01

## 2024-10-21 RX ADMIN — TERAZOSIN HYDROCHLORIDE 5 MG: 5 CAPSULE ORAL at 05:25

## 2024-10-21 RX ADMIN — VALPROIC ACID 150 MG: 250 SOLUTION ORAL at 22:18

## 2024-10-21 RX ADMIN — HYDRALAZINE HYDROCHLORIDE 100 MG: 50 TABLET ORAL at 13:01

## 2024-10-21 RX ADMIN — Medication 10 ML: at 05:24

## 2024-10-21 RX ADMIN — Medication 10 ML: at 13:02

## 2024-10-21 RX ADMIN — HYDRALAZINE HYDROCHLORIDE 100 MG: 50 TABLET ORAL at 22:17

## 2024-10-21 RX ADMIN — TERAZOSIN HYDROCHLORIDE 5 MG: 5 CAPSULE ORAL at 22:16

## 2024-10-21 RX ADMIN — TIMOLOL MALEATE 1 DROP: 5 SOLUTION/ DROPS OPHTHALMIC at 22:18

## 2024-10-21 RX ADMIN — LANSOPRAZOLE 30 MG: 15 TABLET, ORALLY DISINTEGRATING ORAL at 05:15

## 2024-10-21 RX ADMIN — CLONIDINE HYDROCHLORIDE 0.2 MG: 0.1 TABLET ORAL at 22:17

## 2024-10-21 RX ADMIN — SODIUM CHLORIDE 2000 MG: 900 INJECTION INTRAVENOUS at 03:20

## 2024-10-21 RX ADMIN — CLONIDINE HYDROCHLORIDE 0.2 MG: 0.1 TABLET ORAL at 13:01

## 2024-10-21 RX ADMIN — ALBUTEROL SULFATE 2.5 MG: 2.5 SOLUTION RESPIRATORY (INHALATION) at 19:04

## 2024-10-21 RX ADMIN — DEXTROSE MONOHYDRATE 25 G: 25 INJECTION, SOLUTION INTRAVENOUS at 06:37

## 2024-10-21 RX ADMIN — ALBUTEROL SULFATE 2.5 MG: 2.5 SOLUTION RESPIRATORY (INHALATION) at 08:09

## 2024-10-21 RX ADMIN — ALBUTEROL SULFATE 2.5 MG: 2.5 SOLUTION RESPIRATORY (INHALATION) at 15:05

## 2024-10-21 RX ADMIN — TIMOLOL MALEATE 1 DROP: 5 SOLUTION/ DROPS OPHTHALMIC at 13:00

## 2024-10-21 RX ADMIN — SODIUM ZIRCONIUM CYCLOSILICATE 10 G: 10 POWDER, FOR SUSPENSION ORAL at 05:24

## 2024-10-21 RX ADMIN — BRIMONIDINE TARTRATE 1 DROP: 2 SOLUTION/ DROPS OPHTHALMIC at 17:30

## 2024-10-21 RX ADMIN — DOXYCYCLINE 100 MG: 100 INJECTION, POWDER, LYOPHILIZED, FOR SOLUTION INTRAVENOUS at 04:11

## 2024-10-21 RX ADMIN — CARVEDILOL 12.5 MG: 6.25 TABLET, FILM COATED ORAL at 04:47

## 2024-10-21 RX ADMIN — ENOXAPARIN SODIUM 40 MG: 100 INJECTION SUBCUTANEOUS at 13:06

## 2024-10-21 RX ADMIN — MINERAL OIL 5 ML: 1000 LIQUID ORAL at 17:30

## 2024-10-21 RX ADMIN — SODIUM CHLORIDE 50 ML/HR: 9 INJECTION, SOLUTION INTRAVENOUS at 04:19

## 2024-10-21 RX ADMIN — BRIMONIDINE TARTRATE 1 DROP: 2 SOLUTION/ DROPS OPHTHALMIC at 13:02

## 2024-10-21 RX ADMIN — BRIMONIDINE TARTRATE 1 DROP: 2 SOLUTION/ DROPS OPHTHALMIC at 22:17

## 2024-10-21 RX ADMIN — QUETIAPINE FUMARATE 25 MG: 25 TABLET ORAL at 05:13

## 2024-10-21 RX ADMIN — MINERAL OIL 5 ML: 1000 LIQUID ORAL at 22:18

## 2024-10-21 RX ADMIN — FLUOXETINE HYDROCHLORIDE 20 MG: 20 SOLUTION ORAL at 13:01

## 2024-10-21 RX ADMIN — CARVEDILOL 12.5 MG: 6.25 TABLET, FILM COATED ORAL at 22:16

## 2024-10-21 RX ADMIN — QUETIAPINE FUMARATE 25 MG: 25 TABLET ORAL at 22:17

## 2024-10-21 NOTE — PLAN OF CARE
Goal Outcome Evaluation:  Plan of Care Reviewed With: patient        Progress:  (initial eval)       Anticipated Discharge Disposition (SLP): skilled nursing facility          SLP Swallowing Diagnosis: oral dysphagia, suspected pharyngeal dysphagia (10/21/24 5478)

## 2024-10-21 NOTE — CASE MANAGEMENT/SOCIAL WORK
Discharge Planning Assessment  [facility]     Patient Name: Omkar Nava  MRN: [id number]  Today's Date: 10/21/2024    Admit Date: 10/20/2024    Plan: IDP   Discharge Needs Assessment       Row Name 10/21/24 1046       Living Environment    People in Home facility resident;other (see comments)  [facility]    Current Living Arrangements extended care facility    Potentially Unsafe Housing Conditions unable to assess    In the past 12 months has the electric, gas, oil, or water company threatened to shut off services in your home? No    Primary Care Provided by other (see comments)  facility staff    Provides Primary Care For no one, unable/limited ability to care for self    Family Caregiver if Needed child(mable), adult    Family Caregiver Names Idalia Lerma    Quality of Family Relationships unable to assess    Able to Return to Prior Arrangements yes       Resource/Environmental Concerns    Resource/Environmental Concerns none       Transportation Needs    In the past 12 months, has lack of transportation kept you from medical appointments or from getting medications? no    In the past 12 months, has lack of transportation kept you from meetings, work, or from getting things needed for daily living? No       Food Insecurity    Within the past 12 months, you worried that your food would run out before you got the money to buy more. Never true    Within the past 12 months, the food you bought just didn't last and you didn't have money to get more. Never true       Transition Planning    Patient/Family Anticipates Transition to long-term care facility    Transportation Anticipated other (see comments)  will need assistance with transportation       Discharge Needs Assessment    Readmission Within the Last 30 Days unable to assess    Equipment Currently Used at Home wheelchair;oxygen    Do you want help finding or keeping work or a job? Patient unable to answer    Do you want help with school or training? For  example, starting or completing job training or getting a high school diploma, GED or equivalent Patient unable to answer                   Discharge Plan       Row Name 10/21/24 1047       Plan    Plan IDP    Plan Comments Pt. is a resident at [facility] in [facility]. Pt.'s PCP is Alberto Dye. William Roberts handles pt.'s medications.  Pt.'s insurance is Medicare and [place] Medicaid. Pt. is dependent at baseline. Pt. is wheelchair dependent and uses O2. Pt. will need assistance with transportation back to facility when medically ready to d/c. Pt. has an advanced directive and ACP documentation on file. CM will continue to follow pt. throughout his stay.                  Continued Care and Services - Admitted Since 10/20/2024    No active coordination exists for this encounter.       Selected Continued Care - Prior Encounters Includes continued care and service providers with selected services from prior encounters from 7/22/2024 to 10/21/2024      Discharged on 9/18/2024 Admission date: 9/11/2024 - Discharge disposition: Skilled Nursing Facility (DC - External)      Destination       Service Provider Services Address Phone Fax Patient Preferred    [facility] [address] [facility] [address] [phone] --                          Expected Discharge Date and Time       Expected Discharge Date Expected Discharge Time    Oct 23, 2024            Demographic Summary       Row Name 10/21/24 1048       General Information    Admission Type observation    Arrived From long-term care;other (see comments)  [facility]    Referral Source admission list;emergency department    Reason for Consult discharge planning    Preferred Language English                   Functional Status       Row Name 10/21/24 1040       Physical Activity    On average, how many days per week do you engage in moderate to strenuous exercise (like a brisk walk)? 0 days    On average, how  many minutes do you engage in exercise at this level? 0 min    Number of minutes of exercise per week 0       Functional Status, IADL    Medications completely dependent    Meal Preparation completely dependent    Housekeeping completely dependent    Laundry completely dependent    Shopping completely dependent    If for any reason you need help with day-to-day activities such as bathing, preparing meals, shopping, managing finances, etc., do you get the help you need? Patient unable to answer       Mental Status Summary    Recent Changes in Mental Status/Cognitive Functioning unable to assess       Employment/    Employment Status retired                   Psychosocial       Row Name 10/21/24 1045       Mental Health    Why did the patient not complete the Depression Screen questions? Patient unable to answer       Stress    Do you feel stress - tense, restless, nervous, or anxious, or unable to sleep at night because your mind is troubled all the time - these days? Pt Unable                   Abuse/Neglect    No documentation.                  Legal       Row Name 10/21/24 1045       Financial Resource Strain    How hard is it for you to pay for the very basics like food, housing, medical care, and heating? Pt Unable                   Substance Abuse    No documentation.                  Patient Forms    No documentation.                     LUCINDA Ferrell

## 2024-10-21 NOTE — PROGRESS NOTES
[facility]  ADMISSION FOLLOW-UP NOTE          Patient admitted after midnight, H&P by my partner performed earlier on today's date reviewed.  Interim findings, labs, and charting also reviewed.        The [facility] Hospital Problem List has been managed and updated to include any new diagnoses:  Active Hospital Problems    Diagnosis  POA    **UTI (urinary tract infection) [N39.0]  Yes    Vomiting [R11.10]  Yes    Severe vascular dementia without behavioral disturbance, psychotic disturbance, mood disturbance, or anxiety [F01.C0]  Yes    Protein calorie malnutrition [E46]  Yes    S/P transmetatarsal amputation of foot, left [Z89.432]  Not Applicable    Anemia of chronic disease [D63.8]  Yes    Neurocognitive disorder [R41.9]  Yes    Type 2 diabetes mellitus [E11.9]  Yes    Essential hypertension [I10]  Yes    Hypertensive heart and chronic kidney disease 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 failure, with stage 1 through stage 4 chronic kidney disease, or unspecified chronic kidney disease [I13.10]  Yes    Long term (current) use of insulin [Z79.4]  Not Applicable      Resolved Hospital Problems   No resolved problems to display.         ADDITIONAL PLAN:  - detailed assessment and plan from admission reviewed    Omkar RALF Brandy is a 69 y.o. M with a past medical history of hypertension, diabetes, chronic kidney disease, vascular dementia, dysphagia, and chronic iron def anemia admitted with increased vomiting, with concern for possible underlying infection.     Vomiting  --Unclear etiology  --Hold tube feeds, anti-emetics  --UA not convincing of UTI.  Received abx--rocephin and doxy in ED.  Follow up culture results  --Lipase nl     Hyperkalemia  --give dose of lokelma     JAYY on CKD-stage 2  --worse from prior admission.  BUN, Na, and K is elevated.  Most likely 2/2 to dehydration from vomiting.  --Give IVF. Repeat labs pending.     Chronic aspiration  - PEG tube in place  --Nutrition to recommend  TF  --Pt with pureed foods, nectar thick liquids per daughter.--although this is different from ST notes from last admission  --ST consult     Chronic anemia  - At baseline, cont to monitor.     HFpEF  HTN  - Continue carvedilol, clonidine, terazosin, hydralazine  -- Hold diuretics     T2DM  - A1c 5.6 last hospitalization  --Due to poor intake, hold levemir for now.  Cover with SSI.     Dementia with agitation  Questionable seizure disorder  - Seroquel, Depakote, Abilify, prozac  --Obtain valproic acid level.       Expected Discharge   Expected Discharge Date: 10/23/2024; Expected Discharge Time:      Julianne Sanderson II, DO  10/21/24

## 2024-10-21 NOTE — H&P
[facility] Medicine Services  HISTORY AND PHYSICAL    Patient Name: Omkar Nava  : 1955  MRN: [id number]  Primary Care Physician: Alberto Dye MD  Date of admission: 10/20/2024      Subjective   Subjective     Chief Complaint:  vomiting    HPI:  Omkar Nava is a 69 y.o. male with a past medical history of hypertension, diabetes, chronic kidney disease, vascular dementia, dysphagia, and chronic iron def anemia, resides in SNF presents with vomiting x 1 day.  History obtained from patient's daughter, who is his POA, and patient's sister who was present in the room.  They state that he had been tolerating his tube feeds without any difficulty.  The facility called the daughter in the morning to let them know about an episode of vomiting, which continued throughout the day.  Family states that they have not heard any report of him having any fevers, unsure if any diarrhea, denies any shortness of breath.  At baseline he mostly spends time in the bed or the wheelchair.  Typically wears oxygen at the nursing facility at 2 L.  Has baseline blindness of which she just sees shadows.  Neurologically at baseline he knows his name, date of birth, but typically is not able to tell you the year or other recent events.  He is unable to feed himself.      In the ED, he received a normal saline bolus of 500 mL.  Had a urinalysis with 6-10 WBCs and protein, with bacteria.  No leukocyte esterase or nitrites.  CT of the chest with persistent interlobular septal thickening at the lung bases consistent with atelectasis versus edema or an atypical pneumonia.  Pt started on rocephin and doxy.      Personal History     Past Medical History:   Diagnosis Date    Anemia     Dementia     Diabetes mellitus     Dysphagia     GERD (gastroesophageal reflux disease)     History of alcohol abuse     History of cocaine use     History of marijuana use     Hypertension     Osteomyelitis     Poor historian   "   records obtained from nursing home records & his family    Visual impairment            Past Surgical History:   Procedure Laterality Date    AMPUTATION FOOT Left 10/18/2022    Procedure: PARTIAL FIRST RAY AMPUTATION LEFT;  Surgeon: Yeison Petty MD;  Location:  [facility];  Service: Orthopedics;  Laterality: Left;    AMPUTATION FOOT Left 12/5/2022    Procedure: Transmetatarsal of Left Foot;  Surgeon: Yeison Petty MD;  Location:  [facility];  Service: Orthopedics;  Laterality: Left;    ENDOSCOPY N/A 3/14/2024    Procedure: ESOPHAGOGASTRODUODENOSCOPY WITH JEJUNAL TUBE INSERTION AT BEDSIDE;  Surgeon: Brunner, Mark I, MD;  Location:  [facility];  Service: Gastroenterology;  Laterality: N/A;    ENDOSCOPY W/ PEG TUBE PLACEMENT N/A 4/1/2024    Procedure: ESOPHAGOGASTRODUODENOSCOPY WITH PERCUTANEOUS ENDOSCOPIC GASTROSTOMY TUBE INSERTION;  Surgeon: Brunner, Mark I, MD;  Location:  [facility];  Service: Gastroenterology;  Laterality: N/A;  EGD with PEG placement.  Secured at 3.5 cm    EYE SURGERY         Family History: family history includes Diabetes in his brother, brother, father, maternal grandfather, maternal grandmother, mother, and sister; Hypertension in his brother, brother, father, and mother.     Social History:  reports that he quit smoking about 7 years ago. His smoking use included cigarettes. He started smoking about 47 years ago. He has a 40 pack-year smoking history. He has been exposed to tobacco smoke. He has never used smokeless tobacco. He reports that he does not currently use alcohol. He reports current drug use. Drugs: Marijuana and \"Crack\" cocaine.  Social History     Social History Narrative    Caffeine 0-1 servings per day    Patient lives at home .       Medications:  Available home medication information reviewed.  ARIPiprazole, FLUoxetine, Insulin Pen Needle, PALLIATIVE CARE ORAL RINSE ([facility]), ProSource No Carb, QUEtiapine, Valproic Acid, Vitamin D3, acetaminophen, albuterol, " amLODIPine, brimonidine, carvedilol, cloNIDine, hydrALAZINE, hydrOXYzine, insulin detemir, insulin regular, ipratropium-albuterol, lansoprazole, sennosides, terazosin, timolol, and torsemide    No Known Allergies    Objective   Objective     Vital Signs:   Temp:  [98.5 °F (36.9 °C)] 98.5 °F (36.9 °C)  Heart Rate:  [65-77] 73  Resp:  [17] 17  BP: (121-162)/(49-77) 162/49       Physical Exam   Constitutional: No acute distress, awake, alert, answers questions  HENT: NCAT, mucous membranes moist  Respiratory: coarse BS bilaterally, respiratory effort normal   Cardiovascular: RRR, no murmurs, rubs, or gallops  Gastrointestinal: Positive bowel sounds, soft, nontender, nondistended, PEG in place, gauze around GT site.  Musculoskeletal: No bilateral ankle edema, thin extremities.  S/p toe amputation on R foot  Psychiatric: Appropriate affect, cooperative  Neurologic: Oriented x 1, inc tone in his UE -- 4/5 UE.  3/5 LE strength.  Able to follow commands symmetric in all extremities, Cranial Nerves grossly intact to confrontation, speech clear  Skin: No rashes--abrasions on his LE      Result Review:  I have personally reviewed the results from the time of this admission to 10/21/2024 05:27 EDT and agree with these findings:  [x]  Laboratory list / accordion  []  Microbiology  [x]  Radiology  [x]  EKG/Telemetry   [x]  Cardiology/Vascular   []  Pathology  [x]  Old records  []  Other:      LAB RESULTS:      Lab 10/20/24  2049   WBC 8.28   HEMOGLOBIN 8.2*   HEMATOCRIT 25.3*   PLATELETS 232   NEUTROS ABS 6.79   IMMATURE GRANS (ABS) 0.05   LYMPHS ABS 0.99   MONOS ABS 0.43   EOS ABS 0.01   MCV 94.8   PROCALCITONIN 0.13   LACTATE 0.7         Lab 10/20/24  2049   SODIUM 146*   POTASSIUM 5.5*   CHLORIDE 110*   CO2 25.0   ANION GAP 11.0   BUN 40*   CREATININE 1.32*   EGFR 59.1*   GLUCOSE 130*   CALCIUM 9.6         Lab 10/20/24  2049   TOTAL PROTEIN 7.6   ALBUMIN 4.0   GLOBULIN 3.6   ALT (SGPT) 31   AST (SGOT) 22   BILIRUBIN <0.2   ALK  PHOS 97   LIPASE 18                     UA          6/6/2024    21:50 9/11/2024    19:05 10/20/2024    21:23   Urinalysis   Squamous Epithelial Cells, UA  0-2  0-2    Specific Gravity, UA 1.017  1.013  1.019    Ketones, UA Negative  Negative  Negative    Blood, UA Negative  Negative  Negative    Leukocytes, UA Negative  Trace  Negative    Nitrite, UA Negative  Negative  Negative    RBC, UA  11-20  None Seen    WBC, UA  0-2  6-10    Bacteria, UA  None Seen  4+        Microbiology Results (last 10 days)       ** No results found for the last 240 hours. **            CT Chest Without Contrast Diagnostic    Result Date: 10/20/2024  CT CHEST WO CONTRAST DIAGNOSTIC Date of Exam: 10/20/2024 9:40 PM EDT Indication: abdominal pain. Comparison: 9/11/2024. Technique: Axial CT images were obtained of the chest without contrast administration.  Reconstructed coronal and sagittal images were also obtained. Automated exposure control and iterative construction methods were used. Findings: There is a subcentimeter low-density nodule in the right thyroid lobe, which is unchanged. There are calcifications in the left thyroid lobe. There are no specific follow-up recommendations. The trachea, esophagus appear within normal limits. The heart is mildly enlarged. There are mild coronary artery calcifications. Small pericardial effusion is present. Mild aortic atherosclerosis. No mediastinal lymphadenopathy. There are persistent findings of interlobular septal thickening in the lung bases dependently along with bibasilar dependent atelectasis. These changes could be due to hypoinflation, mild interstitial pulmonary edema or atypical pneumonia. The airways are patent. No suspicious lung nodules. No acute findings in the superficial soft tissues. Findings in the abdomen discussed in a separate report. No acute osseous abnormality or destructive bone lesion. There are mild lower cervical and diffuse thoracic degenerative changes.      Impression: Impression: 1.Persistent findings of interlobular septal thickening in the lung bases with bibasilar dependent atelectasis. This could be due to hypoinflation, mild interstitial pulmonary edema or atypical pneumonia. 2.Mild cardiomegaly and small pericardial effusion. 3.Coronary artery disease. Electronically Signed: Rayray Mcclain MD  10/20/2024 10:44 PM EDT  Workstation ID: [id number]    CT Abdomen Pelvis Without Contrast    Result Date: 10/20/2024  CT ABDOMEN PELVIS WO CONTRAST Date of Exam: 10/20/2024 9:40 PM EDT Indication: abdominal pain. Comparison: 9/11/2024. Technique: Axial CT images were obtained of the abdomen and pelvis without the administration of contrast. Reconstructed coronal and sagittal images were also obtained. Automated exposure control and iterative construction methods were used. Findings: There is stable interstitial edema in the lung bases with dependent bibasilar atelectasis. The heart is enlarged. There is diminished attenuation of the blood pool suggesting anemia. Coronary artery calcifications are partially seen. There is a percutaneous gastrostomy tube with tip in the stomach. No acute findings in the superficial soft tissues. No acute osseous abnormality or destructive bone lesion. There is 6 mm anterior spondylolisthesis of L4 on L5 and there is severe facet arthritis at  L4-L5 along with moderate disc degeneration. There are mild degenerative changes at the remainder of the lumbar segments. Mild DJD at both hips. The liver, gallbladder, bile ducts, pancreas, stomach, duodenum and spleen appear within normal limits. No adrenal mass. Kidneys are homogeneous. No hydronephrosis. No urolithiasis. Prostate gland is normal size. There is wall thickening throughout the urinary bladder, which appears more than expected from under distention. Correlate for cystitis. The appendix is normal. There is distal colonic diverticulosis. No small bowel distention. Mild aortoiliac  atherosclerosis. No aneurysm. No ascites, pneumoperitoneum or lymphadenopathy.     Impression: Impression: 1.Wall thickening of the urinary bladder, which appears more than expected from under distention. Correlate for cystitis. 2.Cardiomegaly, coronary artery disease and mild interstitial edema in the lung bases. 3.Percutaneous gastrostomy tube in place. 4.Colonic diverticulosis. 5.Lumbar degenerative changes with grade 1 anterior spondylolisthesis of L4 on L5. Electronically Signed: Rayray Mcclain MD  10/20/2024 10:42 PM EDT  Workstation ID: [id number]     Results for orders placed during the hospital encounter of 01/15/24    Adult Transthoracic Echo Complete With Contrast if Necessary Per Protocol    Interpretation Summary    Left ventricular ejection fraction appears to be 56 - 60%.    Left ventricular wall thickness is consistent with hypertrophy.    Estimated right ventricular systolic pressure from tricuspid regurgitation is mildly elevated (35-45 mmHg).    There is a small (1-2cm) pericardial effusion.    No evidence for pericardial tamponade      Assessment & Plan   Assessment & Plan       UTI (urinary tract infection)    Type 2 diabetes mellitus    Essential hypertension    Neurocognitive disorder    S/P transmetatarsal amputation of foot, left    Anemia of chronic disease    Hypertensive heart and chronic kidney disease 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 failure, with stage 1 through stage 4 chronic kidney disease, or unspecified chronic kidney disease    Long term (current) use of insulin    Protein calorie malnutrition    Severe vascular dementia without behavioral disturbance, psychotic disturbance, mood disturbance, or anxiety    Vomiting    Omkar RALF Nava is a 69 y.o. M with a past medical history of hypertension, diabetes, chronic kidney disease, vascular dementia, dysphagia, and chronic iron def anemia admitted with increased vomiting, with concern for possible underlying infection.    Vomiting  --Unclear  etiology  --Hold tube feeds, anti-emetics  --UA not convincing of UTI.  Received abx--rocephin and doxy in ED.  Follow up culture results  --Lipase nl    Hyperkalemia  --give dose of lokelma    JAYY on CKD-stage 2  --worse from prior admission.  BUN, Na, and K is elevated.  Most likely 2/2 to dehydration from vomiting.  --Give IVF.    Chronic aspiration  - PEG tube in place  --Nutrition to recommend TF  --Pt with pureed foods, nectar thick liquids per daughter.--although this is different from ST notes from last admission  --ST consult    Chronic anemia  - At baseline, cont to monitor.     HFpEF  HTN  - Continue carvedilol, clonidine, terazosin, hydralazine  -- Hold diuretics     T2DM  - A1c 5.6 last hospitalization  --Due to poor intake, hold levemir for now.  Cover with SSI.     Dementia with agitation  Questionable seizure disorder  - Seroquel, Depakote, Abilify, prozac  --Obtain valproic acid level.      VTE Prophylaxis:  Pharmacologic VTE prophylaxis orders are present.          CODE STATUS:    Code Status and Medical Interventions: CPR (Attempt to Resuscitate); Full Support   Ordered at: 10/21/24 0146     Level Of Support Discussed With:    Health Care Surrogate     Code Status (Patient has no pulse and is not breathing):    CPR (Attempt to Resuscitate)     Medical Interventions (Patient has pulse or is breathing):    Full Support       Expected Discharge   Expected Discharge Date: 10/23/2024; Expected Discharge Time:      Taryn Stover MD  10/21/24

## 2024-10-21 NOTE — THERAPY EVALUATION
Acute Care - Speech Language Pathology   Swallow Initial Evaluation [facility].  Clinical Swallow Evaluation         Patient Name: Omkar Nava  : 1955  MRN: [id number]  Today's Date: 10/21/2024               Admit Date: 10/20/2024    Visit Dx:     ICD-10-CM ICD-9-CM   1. Acute cystitis without hematuria  N30.00 595.0   2. Pneumonia due to infectious organism, unspecified laterality, unspecified part of lung  J18.9 486   3. 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, without long-term current use of insulin    Acute osteomyelitis of left foot    Type 2 diabetes mellitus    Essential hypertension    Psychotic disorder    Neurocognitive disorder    S/P transmetatarsal amputation of foot, left    Abscess of left foot    Anemia of chronic disease    Aspiration pneumonia    Cervical spine pain    Chronic kidney disease    Closed head injury without loss of consciousness    Dementia    Dental cavities    Diarrhea of presumed infectious origin    Displaced fracture of proximal phalanx of left great toe, initial encounter for open fracture    Dysphagia    Erectile dysfunction    Generalized muscle weakness    Hallucinations    Hypertensive heart and chronic kidney disease 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 failure, with stage 1 through stage 4 chronic kidney disease, or unspecified chronic kidney disease    Insomnia due to medical condition    Iron deficiency anemia    Laceration without foreign body, left foot, subsequent encounter    Long term (current) use of insulin    Personal history of Methicillin resistant Staphylococcus aureus infection    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    Protein calorie malnutrition    Simple chronic bronchitis    Tobacco use    Type 2 diabetes mellitus with diabetic neuropathy, unspecified    Type 2 diabetes mellitus with diabetic  chronic kidney disease    Acute type 1 respiratory failure    Severe vascular dementia without behavioral disturbance, psychotic disturbance, mood disturbance, or anxiety    UTI (urinary tract infection)    Vomiting     Past Medical History:   Diagnosis Date    Anemia     Dementia     Diabetes mellitus     Dysphagia     GERD (gastroesophageal reflux disease)     History of alcohol abuse     History of cocaine use     History of marijuana use     Hypertension     Osteomyelitis     Poor historian     records obtained from nursing home records & his family    Visual impairment      Past Surgical History:   Procedure Laterality Date    AMPUTATION FOOT Left 10/18/2022    Procedure: PARTIAL FIRST RAY AMPUTATION LEFT;  Surgeon: Yeison Petty MD;  Location:  [facility];  Service: Orthopedics;  Laterality: Left;    AMPUTATION FOOT Left 12/5/2022    Procedure: Transmetatarsal of Left Foot;  Surgeon: Yeison Petty MD;  Location:  [facility];  Service: Orthopedics;  Laterality: Left;    ENDOSCOPY N/A 3/14/2024    Procedure: ESOPHAGOGASTRODUODENOSCOPY WITH JEJUNAL TUBE INSERTION AT BEDSIDE;  Surgeon: Brunner, Mark I, MD;  Location:  [facility];  Service: Gastroenterology;  Laterality: N/A;    ENDOSCOPY W/ PEG TUBE PLACEMENT N/A 4/1/2024    Procedure: ESOPHAGOGASTRODUODENOSCOPY WITH PERCUTANEOUS ENDOSCOPIC GASTROSTOMY TUBE INSERTION;  Surgeon: Brunner, Mark I, MD;  Location:  [facility];  Service: Gastroenterology;  Laterality: N/A;  EGD with PEG placement.  Secured at 3.5 cm    EYE SURGERY         SLP Recommendation and Plan  SLP Swallowing Diagnosis: oral dysphagia, suspected pharyngeal dysphagia (10/21/24 1350)  SLP Diet Recommendation: puree, nectar thick liquids, long term alternate methods of nutrition/hydration (10/21/24 1350)  Recommended Precautions and Strategies: upright posture during/after eating, small bites of food and sips of liquid, general aspiration precautions, reflux precautions, assist with feeding  (10/21/24 1350)  SLP Rec. for Method of Medication Administration: meds whole, meds crushed, with puree, meds via alternate route (10/21/24 1350)     Monitor for Signs of Aspiration: yes, notify SLP if any concerns (10/21/24 1350)  Recommended Diagnostics: VFSS (MBS) (10/21/24 1350)  Swallow Criteria for Skilled Therapeutic Interventions Met: demonstrates skilled criteria (10/21/24 1350)  Anticipated Discharge Disposition (SLP): skilled nursing facility (10/21/24 1350)  Rehab Potential/Prognosis, Swallowing: adequate, monitor progress closely (10/21/24 1350)        Oral Care Recommendations: Oral Care BID/PRN, Suction toothbrush (10/21/24 1350)                                        Progress:  (initial eval)      SWALLOW EVALUATION (Last 72 Hours)       SLP Adult Swallow Evaluation       Row Name 10/21/24 1350       Rehab Evaluation    Document Type evaluation  -CJ    Subjective Information no complaints  -CJ    Patient Observations cooperative  -CJ    Patient/Family/Caregiver Comments/Observations no family present  -CJ    Patient Effort adequate  -CJ    Symptoms Noted During/After Treatment none  -CJ    Oral Care suction provided  -CJ       General Information    Patient Profile Reviewed yes  -CJ    Pertinent History Of Current Problem Pt adm w/ UTI/vomitting; sig h/o dementia, HTN, HTN, CKD, aspiration PNA, DM2 bronchitis. Most recent MBS on 9/13/24 w/ recs for mech ground w/ thins. Reported to nursing pt has been on puree/nectar at [facility]  -    Current Method of Nutrition pureed;nectar/syrup-thick liquids  -CJ    Precautions/Limitations, Vision vision impairment, bilaterally  -CJ    Precautions/Limitations, Hearing WFL;for purposes of eval  -CJ    Prior Level of Function-Communication cognitive-linguistic impairment  -CJ    Prior Level of Function-Swallowing mechanical ground textures;thin liquids;alternative feeding method  has PEG  -CJ    Plans/Goals Discussed with patient  -CJ    Barriers to Rehab  medically complex;visual deficit;previous functional deficit;cognitive status  -    Patient's Goals for Discharge no concerns voiced  -CJ       Pain    Additional Documentation Pain Scale: FACES Pre/Post-Treatment (Group)  -CJ       Pain Scale: FACES Pre/Post-Treatment    Pain: FACES Scale, Pretreatment 0-->no hurt  -CJ    Posttreatment Pain Rating 0-->no hurt  -CJ       Oral Motor Structure and Function    Dentition Assessment missing teeth  -CJ    Secretion Management anterior loss  -CJ    Mucosal Quality sticky  -CJ       Oral Musculature and Cranial Nerve Assessment    Oral Motor General Assessment generalized oral motor weakness  -       General Eating/Swallowing Observations    Respiratory Support Currently in Use nasal cannula  -CJ    O2 Liters 2L  -CJ    Eating/Swallowing Skills fed by SLP;unable to perform self-feeding  -CJ    Positioning During Eating upright in bed  -    Utensils Used spoon;cup;straw  -CJ    Consistencies Trialed pureed;thin liquids;ice chips;nectar/syrup-thick liquids  -       Clinical Swallow Eval    Oral Prep Phase impaired  -CJ    Oral Transit impaired  -CJ    Oral Residue WFL  -    Pharyngeal Phase suspected pharyngeal impairment  -CJ    Esophageal Phase unremarkable  -CJ       Oral Prep Concerns    Oral Prep Concerns increased prep time  -CJ    Increased Prep Time pudding  -CJ       Oral Transit Concerns    Oral Transit Concerns delayed initiation of bolus transit  -    Delayed Intiation of Bolus Transit all consistencies  -       Pharyngeal Phase Concerns    Pharyngeal Phase Concerns throat clear;wet vocal quality  -    Wet Vocal Quality thin  -CJ    Throat Clear thin  -CJ    Pharyngeal Phase Concerns, Comment No s/s w/ trials of nectar thick liquids, Pt declined to accept trials of solids. okay for puree/nectar when fully awake/alert today and plan to complete MBS tomorrow. If any concerns please make NPO and utilize PEG tube  -       SLP Evaluation Clinical  Impression    SLP Swallowing Diagnosis oral dysphagia;suspected pharyngeal dysphagia  -    Functional Impact risk of aspiration/pneumonia  -    Rehab Potential/Prognosis, Swallowing adequate, monitor progress closely  -    Swallow Criteria for Skilled Therapeutic Interventions Met demonstrates skilled criteria  -       Recommendations    SLP Diet Recommendation puree;nectar thick liquids;long term alternate methods of nutrition/hydration  -    Recommended Diagnostics VFSS (MBS)  -    Recommended Precautions and Strategies upright posture during/after eating;small bites of food and sips of liquid;general aspiration precautions;reflux precautions;assist with feeding  -    Oral Care Recommendations Oral Care BID/PRN;Suction toothbrush  -    SLP Rec. for Method of Medication Administration meds whole;meds crushed;with puree;meds via alternate route  -    Monitor for Signs of Aspiration yes;notify SLP if any concerns  -    Anticipated Discharge Disposition (SLP) [facility] nursing [facility]  -              User Key  (r) = Recorded By, (t) = Taken By, (c) = Cosigned By      Initials Name Effective Dates    Eliz Mooney MS CCC-SLP 06/03/24 -                     EDUCATION  The patient has been educated in the following areas:   Dysphagia (Swallowing Impairment) Oral Care/Hydration.                Time Calculation:    Time Calculation- SLP       Row Name 10/21/24 1424             Time Calculation- SLP    SLP Start Time 1350  -      SLP Received On 10/21/24  -         Untimed Charges    43751-EC Eval Oral Pharyng Swallow Minutes 42  -         Total Minutes    Untimed Charges Total Minutes 42  -CJ       Total Minutes 42  -CJ                User Key  (r) = Recorded By, (t) = Taken By, (c) = Cosigned By      Initials Name Provider Type    Eliz Mooney MS CCC-SLP Speech and Language Pathologist                    Therapy Charges for Today       Code Description Service Date Service Provider  Modifiers Qty    [id number] [facility] EVAL ORAL PHARYNG SWALLOW 3 10/21/2024 Eliz Cartagena, MS CCC-SLP GN 1                 Eliz Cartagena MS CCC-SLP  10/21/2024

## 2024-10-21 NOTE — ED NOTES
Omkar Nava    Nursing Report ED to Floor:  Mental status: A&O x3  Ambulatory status: Bedfast  Oxygen Therapy:  2L  Cardiac Rhythm: Sinus with RBBB  Admitted from: [facility]  Safety Concerns:  Fall Risk,  Aspiration Risk, Hypoglycemia   Social Issues: None  ED Room #:  6    ED Nurse Phone Extension - [phone] or may call [phone].      HPI:   Chief Complaint   Patient presents with    Abdominal Pain       Past Medical History:  Past Medical History:   Diagnosis Date    Anemia     Dementia     Diabetes mellitus     Dysphagia     GERD (gastroesophageal reflux disease)     History of alcohol abuse     History of cocaine use     History of marijuana use     Hypertension     Osteomyelitis     Poor historian     records obtained from nursing home records & his family    Visual impairment         Past Surgical History:  Past Surgical History:   Procedure Laterality Date    AMPUTATION FOOT Left 10/18/2022    Procedure: PARTIAL FIRST RAY AMPUTATION LEFT;  Surgeon: Yeison Petty MD;  Location:  [facility];  Service: Orthopedics;  Laterality: Left;    AMPUTATION FOOT Left 12/5/2022    Procedure: Transmetatarsal of Left Foot;  Surgeon: Yeison Petty MD;  Location:  [facility];  Service: Orthopedics;  Laterality: Left;    ENDOSCOPY N/A 3/14/2024    Procedure: ESOPHAGOGASTRODUODENOSCOPY WITH JEJUNAL TUBE INSERTION AT BEDSIDE;  Surgeon: Brunner, Mark I, MD;  Location:  [facility];  Service: Gastroenterology;  Laterality: N/A;    ENDOSCOPY W/ PEG TUBE PLACEMENT N/A 4/1/2024    Procedure: ESOPHAGOGASTRODUODENOSCOPY WITH PERCUTANEOUS ENDOSCOPIC GASTROSTOMY TUBE INSERTION;  Surgeon: Brunner, Mark I, MD;  Location:  [facility];  Service: Gastroenterology;  Laterality: N/A;  EGD with PEG placement.  Secured at 3.5 cm    EYE SURGERY          Admitting Doctor:   Julianne Sanderson II, DO    Consulting Provider(s):  Consults       No orders found for last 30 day(s).             Admitting Diagnosis:   The primary encounter diagnosis was  Acute cystitis without hematuria. A diagnosis of Pneumonia due to infectious organism, unspecified laterality, unspecified part of lung was also pertinent to this visit.    Most Recent Vitals:   Vitals:    10/21/24 0558 10/21/24 0613 10/21/24 0628 10/21/24 0643   BP: 156/66 141/69 152/67 160/68   Pulse: 75 69 74 73   Resp:       Temp:       TempSrc:       SpO2: 97% 98% 96% 97%   Weight:       Height:           Active LDAs/IV Access:   Lines, Drains & Airways       Active LDAs       Name Placement date Placement time Site Days    Peripheral IV 10/20/24 1826 Right Forearm 10/20/24  1826  Forearm  less than 1    Gastrostomy/Enterostomy Percutaneous endoscopic gastrostomy (PEG) 1 20 Fr. LUQ 04/01/24  1453  LUQ  202    External Urinary Catheter 09/14/24  0945  --  36                    Labs (abnormal labs have a star):   Labs Reviewed   COMPREHENSIVE METABOLIC PANEL - Abnormal; Notable for the following components:       Result Value    Glucose 130 (*)     BUN 40 (*)     Creatinine 1.32 (*)     Sodium 146 (*)     Potassium 5.5 (*)     Chloride 110 (*)     BUN/Creatinine Ratio 30.3 (*)     eGFR 59.1 (*)     All other components within normal limits    Narrative:     GFR Normal >60  Chronic Kidney Disease <60  Kidney Failure <15     URINALYSIS W/ MICROSCOPIC IF INDICATED (NO CULTURE) - Abnormal; Notable for the following components:    Protein,  mg/dL (2+) (*)     All other components within normal limits   CBC WITH AUTO DIFFERENTIAL - Abnormal; Notable for the following components:    RBC 2.67 (*)     Hemoglobin 8.2 (*)     Hematocrit 25.3 (*)     Neutrophil % 82.0 (*)     Lymphocyte % 12.0 (*)     Eosinophil % 0.1 (*)     Immature Grans % 0.6 (*)     All other components within normal limits   URINALYSIS, MICROSCOPIC ONLY - Abnormal; Notable for the following components:    WBC, UA 6-10 (*)     Bacteria, UA 4+ (*)     All other components within normal limits   LIPASE - Normal   LACTIC ACID, PLASMA - Normal  "  PROCALCITONIN - Normal    Narrative:     As a Marker for Sepsis (Non-Neonates):    1. <0.5 ng/mL represents a low risk of severe sepsis and/or septic shock.  2. >2 ng/mL represents a high risk of severe sepsis and/or septic shock.    As a Marker for Lower Respiratory Tract Infections that require antibiotic therapy:    PCT on Admission    Antibiotic Therapy       6-12 Hrs later    >0.5                Strongly Recommended  >0.25 - <0.5        Recommended   0.1 - 0.25          Discouraged              Remeasure/reassess PCT  <0.1                Strongly Discouraged     Remeasure/reassess PCT    As 28 day mortality risk marker: \"Change in Procalcitonin Result\" (>80% or <=80%) if Day 0 (or Day 1) and Day 4 values are available. Refer to http://www.[employer] Mind TechnologyList of [place] hospitals according to the [facility]-pct-calculator.com    Change in PCT <=80%  A decrease of PCT levels below or equal to 80% defines a positive change in PCT test result representing a higher risk for 28-day all-cause mortality of patients diagnosed with severe sepsis for septic shock.    Change in PCT >80%  A decrease of PCT levels of more than 80% defines a negative change in PCT result representing a lower risk for 28-day all-cause mortality of patients diagnosed with severe sepsis or septic shock.      BLOOD CULTURE   BLOOD CULTURE   URINE CULTURE   RAINBOW DRAW    Narrative:     The following orders were created for panel order [place] Draw.  Procedure                               Abnormality         Status                     ---------                               -----------         ------                     Green Top (Gel)[[id number]]                                  Final result               Lavender Top[[id number]]                                     Final result               Gold Top - SST[[id number]]                                   Final result               [place] Top[[id number]]                                         Final result               Light Blue Top[[id number]]                   "                 Final result                 Please view results for these tests on the individual orders.   BASIC METABOLIC PANEL   VALPROIC ACID LEVEL, TOTAL   POCT GLUCOSE FINGERSTICK   POCT GLUCOSE FINGERSTICK   POCT GLUCOSE FINGERSTICK   POCT GLUCOSE FINGERSTICK   POCT GLUCOSE FINGERSTICK   CBC AND DIFFERENTIAL    Narrative:     The following orders were created for panel order CBC & Differential.  Procedure                               Abnormality         Status                     ---------                               -----------         ------                     CBC Auto Differential[[id number]]        Abnormal            Final result                 Please view results for these tests on the individual orders.   GREEN TOP   LAVENDER TOP   GOLD TOP - SST   GRAY TOP   LIGHT BLUE TOP       Meds Given in ED:   Medications   sodium chloride 0.9 % flush 10 mL (has no administration in time range)   albuterol (PROVENTIL) nebulizer solution 0.083% 2.5 mg/3mL (has no administration in time range)   amLODIPine (NORVASC) tablet 10 mg (has no administration in time range)   ARIPiprazole (ABILIFY) tablet 5 mg (has no administration in time range)   brimonidine (ALPHAGAN) 0.2 % ophthalmic solution 1 drop (has no administration in time range)   carvedilol (COREG) tablet 12.5 mg (12.5 mg Per PEG Tube Given 10/21/24 0447)   cloNIDine (CATAPRES) tablet 0.2 mg ( Per PEG Tube Canceled Entry 10/21/24 0600)   FLUoxetine (PROzac) 20 MG/5ML solution 20 mg (has no administration in time range)   hydrALAZINE (APRESOLINE) tablet 100 mg ( Per PEG Tube Canceled Entry 10/21/24 0600)   hydrOXYzine (ATARAX) tablet 25 mg (has no administration in time range)   lansoprazole (PREVACID SOLUTAB) disintegrating tablet Tablet Delayed Release Dispersible 30 mg (30 mg Per PEG Tube Given 10/21/24 0515)   ProSource No Carb oral solution 30 mL (has no administration in time range)   palliative care oral rinse (has no administration in time range)    QUEtiapine (SEROquel) tablet 25 mg (25 mg Per PEG Tube Given 10/21/24 0513)   terazosin (HYTRIN) capsule 5 mg (5 mg Per G Tube Given 10/21/24 0525)   timolol (TIMOPTIC) 0.5 % ophthalmic solution 1 drop (has no administration in time range)   Valproic Acid (DEPAKENE) syrup 150 mg ( Nasogastric Canceled Entry 10/21/24 0702)   sodium chloride 0.9 % flush 10 mL (10 mL Intravenous Given 10/21/24 0524)   sodium chloride 0.9 % flush 10 mL (has no administration in time range)   dextrose (GLUTOSE) oral gel 15 g (has no administration in time range)   dextrose (D50W) (25 g/50 mL) IV injection 25 g (25 g Intravenous Given 10/21/24 0637)   glucagon (GLUCAGEN) injection 1 mg (has no administration in time range)   insulin regular (humuLIN R,novoLIN R) injection 2-7 Units ( Subcutaneous Not Given 10/21/24 0587)   Enoxaparin Sodium (LOVENOX) syringe 40 mg (has no administration in time range)   nitroglycerin (NITROSTAT) SL tablet 0.4 mg (has no administration in time range)   sodium chloride 0.9 % infusion (50 mL/hr Intravenous New Bag 10/21/24 2439)   Potassium Replacement - Follow Nurse / BPA Driven Protocol (has no administration in time range)   Magnesium Standard Dose Replacement - Follow Nurse / BPA Driven Protocol (has no administration in time range)   Phosphorus Replacement - Follow Nurse / BPA Driven Protocol (has no administration in time range)   acetaminophen (TYLENOL) tablet 650 mg (has no administration in time range)     Or   acetaminophen (TYLENOL) 160 MG/5ML oral solution 650 mg (has no administration in time range)     Or   acetaminophen (TYLENOL) suppository 650 mg (has no administration in time range)   sennosides-docusate (PERICOLACE) 8.6-50 MG per tablet 2 tablet (has no administration in time range)     And   polyethylene glycol (MIRALAX) packet 17 g (has no administration in time range)     And   bisacodyl (DULCOLAX) suppository 10 mg (has no administration in time range)   ondansetron (ZOFRAN)  injection 4 mg (has no administration in time range)   sodium chloride 0.9 % bolus 500 mL (0 mL Intravenous Stopped 10/20/24 2242)   cefTRIAXone (ROCEPHIN) 2,000 mg in sodium chloride 0.9 % 100 mL MBP (0 mg Intravenous Stopped 10/21/24 0400)   doxycycline (VIBRAMYCIN) 100 mg in sodium chloride 0.9 % 100 mL MBP (0 mg Intravenous Stopped 10/21/24 0543)   sodium zirconium cyclosilicate (LOKELMA) packet 10 g (10 g Per G Tube Given 10/21/24 0524)     sodium chloride, 50 mL/hr, Last Rate: 50 mL/hr (10/21/24 0419)         Last NIH score:                                                          Dysphagia screening results:  Patient Factors Component (Dysphagia:Stroke or Rule-out)  Best Eye Response: 4-->(E4) spontaneous (10/20/24 1837)  Best Motor Response: 6-->(M6) obeys commands (10/20/24 1837)  Best Verbal Response: 4-->(V4) confused (GCS 14 is normal baseline mental status.) (10/20/24 1837)  [place] Coma Scale Score: 14 (10/20/24 1837)     [place] Coma Scale:  No data recorded     CIWA:        Restraint Type:            Isolation Status:  No active isolations

## 2024-10-21 NOTE — CONSULTS
Clinical Nutrition     Patient Name: Omkar Nava  YOB: 1955  MRN: [id number]  Date of Encounter: 10/21/24 14:36 EDT  Admission date: 10/20/2024  Reason for Visit: Consult, EN    Assessment   Nutrition Assessment   Admission Diagnosis:  UTI (urinary tract infection) [N39.0]  Vomiting [R11.10]    Problem List:    UTI (urinary tract infection)    Type 2 diabetes mellitus    Essential hypertension    Neurocognitive disorder    S/P transmetatarsal amputation of foot, left    Anemia of chronic disease    Hypertensive heart and chronic kidney disease 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 failure, with stage 1 through stage 4 chronic kidney disease, or unspecified chronic kidney disease    Long term (current) use of insulin    Protein calorie malnutrition    Severe vascular dementia without behavioral disturbance, psychotic disturbance, mood disturbance, or anxiety    Vomiting      PMH:   He  has a past medical history of Anemia, Dementia, Diabetes mellitus, Dysphagia, GERD (gastroesophageal reflux disease), History of alcohol abuse, History of cocaine use, History of marijuana use, Hypertension, Osteomyelitis, Poor historian, and Visual impairment.    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    Applicable Nutrition History:   CKD 2  T2DM, insulin use  HFpEF  Seizure disorder  Vascular dementia w/ agitation  Dysphagia s/p PEG (4/2024)    Labs    Labs Reviewed: Yes    Results from last 7 days   Lab Units 10/21/24  1136 10/20/24  2049   GLUCOSE mg/dL 138* 130*   BUN mg/dL 30* 40*   CREATININE mg/dL 1.12 1.32*   SODIUM mmol/L 143 146*   CHLORIDE mmol/L 110* 110*   POTASSIUM mmol/L 5.0 5.5*   ALT (SGPT) U/L  --  31   LACTATE mmol/L  --  0.7       Results from last 7 days   Lab Units 10/20/24  2049   ALBUMIN g/dL 4.0  "      Results from last 7 days   Lab Units 10/21/24  1112 10/21/24  0733   GLUCOSE mg/dL 137* 171*     Lab Results   Lab Value Date/Time    HGBA1C 5.60 09/15/2024 0658    HGBA1C 7.00 (H) 10/16/2022 0432    HGBA1C 8.7 (H) 06/25/2022 0242    HGBA1C 13.4 04/14/2022 1058     Medications    Medications Reviewed: yes    Scheduled Meds:albuterol, 2.5 mg, Nebulization, Q6H - RT  amLODIPine, 10 mg, Per PEG Tube, Q24H  ARIPiprazole, 5 mg, Per G Tube, Daily  brimonidine, 1 drop, Both Eyes, TID  carvedilol, 12.5 mg, Per PEG Tube, Q12H  cloNIDine, 0.2 mg, Per PEG Tube, Q8H  enoxaparin, 40 mg, Subcutaneous, Daily  FLUoxetine, 20 mg, Oral, Daily  hydrALAZINE, 100 mg, Per PEG Tube, Q8H  insulin regular, 2-7 Units, Subcutaneous, Q6H  lansoprazole, 30 mg, Per PEG Tube, Q AM  palliative care oral rinse, 5 mL, Mouth/Throat, 4x Daily  ProSource No Carb, 30 mL, Per G Tube, BID  QUEtiapine, 25 mg, Per PEG Tube, Nightly  sodium chloride, 10 mL, Intravenous, Q12H  terazosin, 5 mg, Per G Tube, Q12H  timolol, 1 drop, Both Eyes, BID  Valproic Acid, 150 mg, Nasogastric, Q8H      Continuous Infusions:   PRN Meds:.  acetaminophen **OR** acetaminophen **OR** acetaminophen    senna-docusate sodium **AND** polyethylene glycol **AND** [DISCONTINUED] bisacodyl **AND** bisacodyl    dextrose    dextrose    glucagon (human recombinant)    hydrOXYzine    Magnesium Standard Dose Replacement - Follow Nurse / BPA Driven Protocol    nitroglycerin    [DISCONTINUED] ondansetron ODT **OR** ondansetron    Phosphorus Replacement - Follow Nurse / BPA Driven Protocol    Potassium Replacement - Follow Nurse / BPA Driven Protocol    sodium chloride    sodium chloride    Intake/Ouptut 24 hrs (0701 - 0700)   I&O's Reviewed: yes    Anthropometrics     Height: Height: 172.7 cm (68\")  Last Filed Weight: Weight: 77 kg (169 lb 12.8 oz) (10/21/24 1051)  Method: Weight Method: Bed scale  BMI: BMI (Calculated): 25.8    UBW:    Weight      Weight (kg) Weight (lbs) Weight Method " "  4/1/2024 90.7 kg  199 lb 15.3 oz  Bed scale    4/2/2024 92.1 kg  203 lb 0.7 oz  Bed scale    5/28/2024 79.379 kg  175 lb     5/30/2024 79.379 kg  175 lb     6/6/2024 79.379 kg  175 lb     9/11/2024 79.4 kg  175 lb 0.7 oz  Estimated    9/12/2024 86.229 kg  190 lb 1.6 oz  Bed scale    9/13/2024 83.462 kg  184 lb     9/14/2024 84.505 kg  186 lb 4.8 oz     9/15/2024 84.233 kg  185 lb 11.2 oz     9/18/2024 83.734 kg  184 lb 9.6 oz  Bed scale    10/20/2024 86.183 kg  190 lb  Estimated    10/21/2024 77.021 kg  169 lb 12.8 oz  Bed scale      Weight change: KAROLYN    Nutrition Focused Physical Exam     Date: 10/21/24     Unable to perform due to Pt unable to participate at time of visit     Subjective   Reported/Observed/Food/Nutrition Related History:     10/21/24   Attempted to interview patient with assigned RN at bedside, however, patient would not awaken to participate. Spoke with COREEN Damon @ [facility] regarding patient's EN regimen. Reported his EN orders had been removed already, but from what she can recall he was on Glucerna, maybe @ 60ml/hr with 85ml/hr FWF x22hr and was receiving a pureed, thin liquid meal tray as well.    Needs Assessment   Date: 10/21/24     Height used:Height: 172.7 cm (68\")  Weights used: 77kg    Estimated Calorie needs: ~ 1900 Kcal/day  Method: 25-30 Kcals/KG = 2873-1433  Method: 1.0-1.2 MSJ = 5454-9159    Estimated Protein needs: ~ 70g PRO/day  Method: 0.8-1g/Kg = 61-77g pro    Estimated Fluid needs: ~ ml/day   Per clinical status  Current Nutrition Prescription   PO: Diet: Regular/House, Diabetic; Consistent Carbohydrate; Texture: Pureed (NDD 1); Fluid Consistency: [place] Thick  Oral Nutrition Supplement: n/a  Intake: Insufficient data    Assessment & Plan   Nutrition Diagnosis   Date: 10/21/24  Updated:   Problem Swallowing difficulty    Etiology Oropharyngeal dysphagia   Signs/Symptoms PEG and modified PO diet   Status: New    Goal:   Nutrition to support treatment, Establish PO, and " Initiate EN, Establish EN tolerance, Tolerate EN at goal, Deliver estimated needs    Nutrition Intervention      Follow treatment progress, Care plan reviewed, Encourage intake, Nutrition support order placed    Patient does not meet malnutrition criteria at this time.  Encourage PO intake as able    EN Recommendations:  Initiate Diabetisource AC @ 40ml/hr, increasing 10ml q2hr as tolerated until goal rate of 80ml/hr is reached + 15ml/hr FWF  EN @ goal over 14hr to supply:  Goal Volume 1120  mL/day     Flush Volume 210 mL/day     Energy 1344 Kcal/day 71 % Est Need   Protein 67 g/day 96 % Est Need   Fiber 17 g/day     Water in   mL     Total Water 1128 mL     Meet DRI No      Will adjust EN/FWF PRN    Monitoring/Evaluation:   Per protocol, I&O, PO intake, Pertinent labs, EN delivery/tolerance, Weight, GI status, Symptoms, POC/GOC, Swallow function    Marija Yo MS,RD,LD  Time Spent: 35min

## 2024-10-22 PROBLEM — G93.40 ACUTE ENCEPHALOPATHY: Status: ACTIVE | Noted: 2024-10-22

## 2024-10-22 LAB
ATMOSPHERIC PRESS: ABNORMAL MM[HG]
B PARAPERT DNA SPEC QL NAA+PROBE: NOT DETECTED
B PERT DNA SPEC QL NAA+PROBE: NOT DETECTED
BASE EXCESS BLDV CALC-SCNC: 2.1 MMOL/L (ref -2–2)
BASOPHILS # BLD AUTO: 0.01 10*3/MM3 (ref 0–0.2)
BASOPHILS NFR BLD AUTO: 0.2 % (ref 0–1.5)
BDY SITE: ABNORMAL
BODY TEMPERATURE: 37
C PNEUM DNA NPH QL NAA+NON-PROBE: NOT DETECTED
CO2 BLDA-SCNC: 29 MMOL/L (ref 22–33)
COHGB MFR BLD: 1 %
DEPRECATED RDW RBC AUTO: 48.1 FL (ref 37–54)
EOSINOPHIL # BLD AUTO: 0.11 10*3/MM3 (ref 0–0.4)
EOSINOPHIL NFR BLD AUTO: 1.9 % (ref 0.3–6.2)
EPAP: 0
ERYTHROCYTE [DISTWIDTH] IN BLOOD BY AUTOMATED COUNT: 14.2 % (ref 12.3–15.4)
FLUAV SUBTYP SPEC NAA+PROBE: NOT DETECTED
FLUBV RNA ISLT QL NAA+PROBE: NOT DETECTED
GLUCOSE BLDC GLUCOMTR-MCNC: 120 MG/DL (ref 70–130)
GLUCOSE BLDC GLUCOMTR-MCNC: 123 MG/DL (ref 70–130)
GLUCOSE BLDC GLUCOMTR-MCNC: 129 MG/DL (ref 70–130)
GLUCOSE BLDC GLUCOMTR-MCNC: 153 MG/DL (ref 70–130)
GLUCOSE BLDC GLUCOMTR-MCNC: 161 MG/DL (ref 70–130)
GLUCOSE BLDC GLUCOMTR-MCNC: 178 MG/DL (ref 70–130)
HADV DNA SPEC NAA+PROBE: NOT DETECTED
HCO3 BLDV-SCNC: 27.6 MMOL/L (ref 22–28)
HCOV 229E RNA SPEC QL NAA+PROBE: NOT DETECTED
HCOV HKU1 RNA SPEC QL NAA+PROBE: NOT DETECTED
HCOV NL63 RNA SPEC QL NAA+PROBE: NOT DETECTED
HCOV OC43 RNA SPEC QL NAA+PROBE: NOT DETECTED
HCT VFR BLD AUTO: 26.3 % (ref 37.5–51)
HGB BLD-MCNC: 8.4 G/DL (ref 13–17.7)
HGB BLDA-MCNC: 9.1 G/DL (ref 13.5–17.5)
HMPV RNA NPH QL NAA+NON-PROBE: NOT DETECTED
HPIV1 RNA ISLT QL NAA+PROBE: NOT DETECTED
HPIV2 RNA SPEC QL NAA+PROBE: NOT DETECTED
HPIV3 RNA NPH QL NAA+PROBE: NOT DETECTED
HPIV4 P GENE NPH QL NAA+PROBE: NOT DETECTED
IMM GRANULOCYTES # BLD AUTO: 0.01 10*3/MM3 (ref 0–0.05)
IMM GRANULOCYTES NFR BLD AUTO: 0.2 % (ref 0–0.5)
INHALED O2 CONCENTRATION: 28 %
IPAP: 0
LYMPHOCYTES # BLD AUTO: 1.3 10*3/MM3 (ref 0.7–3.1)
LYMPHOCYTES NFR BLD AUTO: 22.4 % (ref 19.6–45.3)
M PNEUMO IGG SER IA-ACNC: NOT DETECTED
MCH RBC QN AUTO: 29.6 PG (ref 26.6–33)
MCHC RBC AUTO-ENTMCNC: 31.9 G/DL (ref 31.5–35.7)
MCV RBC AUTO: 92.6 FL (ref 79–97)
METHGB BLD QL: 0.4 %
MODALITY: ABNORMAL
MONOCYTES # BLD AUTO: 0.57 10*3/MM3 (ref 0.1–0.9)
MONOCYTES NFR BLD AUTO: 9.8 % (ref 5–12)
NEUTROPHILS NFR BLD AUTO: 3.8 10*3/MM3 (ref 1.7–7)
NEUTROPHILS NFR BLD AUTO: 65.5 % (ref 42.7–76)
NRBC BLD AUTO-RTO: 0 /100 WBC (ref 0–0.2)
OXYHGB MFR BLDV: 97.4 %
PAW @ PEAK INSP FLOW SETTING VENT: 0 CMH2O
PCO2 BLDV: 46.5 MM HG (ref 41–51)
PH BLDV: 7.38 PH UNITS (ref 7.31–7.41)
PLATELET # BLD AUTO: 206 10*3/MM3 (ref 140–450)
PMV BLD AUTO: 9.8 FL (ref 6–12)
PO2 BLDV: 108 MM HG (ref 27–53)
PROCALCITONIN SERPL-MCNC: 0.05 NG/ML (ref 0–0.25)
RBC # BLD AUTO: 2.84 10*6/MM3 (ref 4.14–5.8)
RHINOVIRUS RNA SPEC NAA+PROBE: DETECTED
RSV RNA NPH QL NAA+NON-PROBE: NOT DETECTED
SARS-COV-2 RNA NPH QL NAA+NON-PROBE: NOT DETECTED
TOTAL RATE: 0 BREATHS/MINUTE
WBC NRBC COR # BLD AUTO: 5.8 10*3/MM3 (ref 3.4–10.8)

## 2024-10-22 PROCEDURE — 84145 PROCALCITONIN (PCT): CPT | Performed by: HOSPITALIST

## 2024-10-22 PROCEDURE — 82805 BLOOD GASES W/O2 SATURATION: CPT

## 2024-10-22 PROCEDURE — 94799 UNLISTED PULMONARY SVC/PX: CPT

## 2024-10-22 PROCEDURE — 25010000002 ONDANSETRON PER 1 MG: Performed by: PEDIATRICS

## 2024-10-22 PROCEDURE — 0202U NFCT DS 22 TRGT SARS-COV-2: CPT | Performed by: HOSPITALIST

## 2024-10-22 PROCEDURE — 82948 REAGENT STRIP/BLOOD GLUCOSE: CPT

## 2024-10-22 PROCEDURE — 63710000001 INSULIN REGULAR HUMAN PER 5 UNITS: Performed by: PEDIATRICS

## 2024-10-22 PROCEDURE — 63710000001 INSULIN LISPRO (HUMAN) PER 5 UNITS: Performed by: HOSPITALIST

## 2024-10-22 PROCEDURE — 99232 SBSQ HOSP IP/OBS MODERATE 35: CPT | Performed by: HOSPITALIST

## 2024-10-22 PROCEDURE — 92610 EVALUATE SWALLOWING FUNCTION: CPT

## 2024-10-22 PROCEDURE — 25010000002 ENOXAPARIN PER 10 MG: Performed by: PEDIATRICS

## 2024-10-22 PROCEDURE — 85025 COMPLETE CBC W/AUTO DIFF WBC: CPT | Performed by: HOSPITALIST

## 2024-10-22 PROCEDURE — 94664 DEMO&/EVAL PT USE INHALER: CPT

## 2024-10-22 RX ORDER — INSULIN LISPRO 100 [IU]/ML
2-7 INJECTION, SOLUTION INTRAVENOUS; SUBCUTANEOUS
Status: DISCONTINUED | OUTPATIENT
Start: 2024-10-22 | End: 2024-10-26

## 2024-10-22 RX ORDER — FLUOXETINE 20 MG/5ML
20 SOLUTION ORAL DAILY
Status: DISCONTINUED | OUTPATIENT
Start: 2024-10-23 | End: 2024-10-31 | Stop reason: HOSPADM

## 2024-10-22 RX ADMIN — ALBUTEROL SULFATE 2.5 MG: 2.5 SOLUTION RESPIRATORY (INHALATION) at 19:43

## 2024-10-22 RX ADMIN — ALBUTEROL SULFATE 2.5 MG: 2.5 SOLUTION RESPIRATORY (INHALATION) at 12:10

## 2024-10-22 RX ADMIN — HYDRALAZINE HYDROCHLORIDE 100 MG: 50 TABLET ORAL at 15:02

## 2024-10-22 RX ADMIN — ARIPIPRAZOLE 5 MG: 5 TABLET ORAL at 09:35

## 2024-10-22 RX ADMIN — CLONIDINE HYDROCHLORIDE 0.2 MG: 0.1 TABLET ORAL at 15:02

## 2024-10-22 RX ADMIN — MINERAL OIL 5 ML: 1000 LIQUID ORAL at 18:45

## 2024-10-22 RX ADMIN — LANSOPRAZOLE 30 MG: 15 TABLET, ORALLY DISINTEGRATING ORAL at 05:13

## 2024-10-22 RX ADMIN — BRIMONIDINE TARTRATE 1 DROP: 2 SOLUTION/ DROPS OPHTHALMIC at 11:16

## 2024-10-22 RX ADMIN — QUETIAPINE FUMARATE 25 MG: 25 TABLET ORAL at 22:18

## 2024-10-22 RX ADMIN — ENOXAPARIN SODIUM 40 MG: 100 INJECTION SUBCUTANEOUS at 09:34

## 2024-10-22 RX ADMIN — INSULIN HUMAN 2 UNITS: 100 INJECTION, SOLUTION PARENTERAL at 06:27

## 2024-10-22 RX ADMIN — BRIMONIDINE TARTRATE 1 DROP: 2 SOLUTION/ DROPS OPHTHALMIC at 22:36

## 2024-10-22 RX ADMIN — BRIMONIDINE TARTRATE 1 DROP: 2 SOLUTION/ DROPS OPHTHALMIC at 18:46

## 2024-10-22 RX ADMIN — ONDANSETRON 4 MG: 2 INJECTION INTRAMUSCULAR; INTRAVENOUS at 11:48

## 2024-10-22 RX ADMIN — ALBUTEROL SULFATE 2.5 MG: 2.5 SOLUTION RESPIRATORY (INHALATION) at 01:44

## 2024-10-22 RX ADMIN — TIMOLOL MALEATE 1 DROP: 5 SOLUTION/ DROPS OPHTHALMIC at 11:16

## 2024-10-22 RX ADMIN — ALBUTEROL SULFATE 2.5 MG: 2.5 SOLUTION RESPIRATORY (INHALATION) at 07:44

## 2024-10-22 RX ADMIN — Medication 10 ML: at 09:35

## 2024-10-22 RX ADMIN — VALPROIC ACID 150 MG: 250 SOLUTION ORAL at 05:14

## 2024-10-22 RX ADMIN — FLUOXETINE HYDROCHLORIDE 20 MG: 20 SOLUTION ORAL at 09:35

## 2024-10-22 RX ADMIN — VALPROIC ACID 150 MG: 250 SOLUTION ORAL at 22:19

## 2024-10-22 RX ADMIN — TIMOLOL MALEATE 1 DROP: 5 SOLUTION/ DROPS OPHTHALMIC at 22:36

## 2024-10-22 RX ADMIN — Medication 10 ML: at 22:39

## 2024-10-22 RX ADMIN — TERAZOSIN HYDROCHLORIDE 5 MG: 5 CAPSULE ORAL at 09:34

## 2024-10-22 RX ADMIN — CLONIDINE HYDROCHLORIDE 0.2 MG: 0.1 TABLET ORAL at 05:14

## 2024-10-22 RX ADMIN — MINERAL OIL 5 ML: 1000 LIQUID ORAL at 22:47

## 2024-10-22 RX ADMIN — HYDRALAZINE HYDROCHLORIDE 100 MG: 50 TABLET ORAL at 05:14

## 2024-10-22 RX ADMIN — VALPROIC ACID 150 MG: 250 SOLUTION ORAL at 15:15

## 2024-10-22 RX ADMIN — INSULIN LISPRO 2 UNITS: 100 INJECTION, SOLUTION INTRAVENOUS; SUBCUTANEOUS at 22:37

## 2024-10-22 RX ADMIN — MINERAL OIL 5 ML: 1000 LIQUID ORAL at 15:15

## 2024-10-22 NOTE — PROGRESS NOTES
Clinical Nutrition     Patient Name: Omkar Nava  YOB: 1955  MRN: [id number]  Date of Encounter: 10/22/24 12:37 EDT  Admission date: 10/20/2024  Reason for Visit: Follow-up protocol, EN    Assessment   Nutrition Assessment   Admission Diagnosis:  UTI (urinary tract infection) [N39.0]  Vomiting [R11.10]    Problem List:    UTI (urinary tract infection)    Type 2 diabetes mellitus    Essential hypertension    Neurocognitive disorder    S/P transmetatarsal amputation of foot, left    Anemia of chronic disease    Hypertensive heart and chronic kidney disease 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 failure, with stage 1 through stage 4 chronic kidney disease, or unspecified chronic kidney disease    Long term (current) use of insulin    Protein calorie malnutrition    Severe vascular dementia without behavioral disturbance, psychotic disturbance, mood disturbance, or anxiety    Vomiting      PMH:   He  has a past medical history of Anemia, Dementia, Diabetes mellitus, Dysphagia, GERD (gastroesophageal reflux disease), History of alcohol abuse, History of cocaine use, History of marijuana use, Hypertension, Osteomyelitis, Poor historian, and Visual impairment.    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    Applicable Nutrition History:   CKD 2  T2DM, insulin use  HFpEF  Seizure disorder  Vascular dementia w/ agitation  Dysphagia s/p PEG (4/2024)    10/22 SLP: refused MBS today, agreeable for tomorrow; puree, nectar thick liquids, long term alternate methods of nutrition/hydration     Labs    Labs Reviewed: Yes    Results from last 7 days   Lab Units 10/21/24  1136 10/20/24  2049   GLUCOSE mg/dL 138* 130*   BUN mg/dL 30* 40*   CREATININE mg/dL 1.12 1.32*   SODIUM mmol/L 143 146*   CHLORIDE mmol/L 110* 110*   POTASSIUM  mmol/L 5.0 5.5*   ALT (SGPT) U/L  --  31   LACTATE mmol/L  --  0.7       Results from last 7 days   Lab Units 10/20/24  2049   ALBUMIN g/dL 4.0       Results from last 7 days   Lab Units 10/22/24  0718 10/22/24  0622 10/22/24  0035 10/21/24  2024 10/21/24  1643 10/21/24  1112   GLUCOSE mg/dL 161* 153* 129 126 121 137*     Lab Results   Lab Value Date/Time    HGBA1C 5.60 09/15/2024 0658    HGBA1C 7.00 (H) 10/16/2022 0432    HGBA1C 8.7 (H) 06/25/2022 0242    HGBA1C 13.4 04/14/2022 1058     Medications    Medications Reviewed: yes    Scheduled Meds:albuterol, 2.5 mg, Nebulization, Q6H - RT  amLODIPine, 10 mg, Per PEG Tube, Q24H  ARIPiprazole, 5 mg, Per G Tube, Daily  brimonidine, 1 drop, Both Eyes, TID  carvedilol, 12.5 mg, Per PEG Tube, Q12H  cloNIDine, 0.2 mg, Per PEG Tube, Q8H  enoxaparin, 40 mg, Subcutaneous, Daily  [START ON 10/23/2024] FLUoxetine, 20 mg, Per PEG Tube, Daily  hydrALAZINE, 100 mg, Per PEG Tube, Q8H  insulin regular, 2-7 Units, Subcutaneous, Q6H  lansoprazole, 30 mg, Per PEG Tube, Q AM  palliative care oral rinse, 5 mL, Mouth/Throat, 4x Daily  QUEtiapine, 25 mg, Per PEG Tube, Nightly  sodium chloride, 10 mL, Intravenous, Q12H  terazosin, 5 mg, Per G Tube, Q12H  timolol, 1 drop, Both Eyes, BID  Valproic Acid, 150 mg, Nasogastric, Q8H      Continuous Infusions:   PRN Meds:.  acetaminophen **OR** acetaminophen **OR** acetaminophen    senna-docusate sodium **AND** polyethylene glycol **AND** [DISCONTINUED] bisacodyl **AND** bisacodyl    dextrose    glucagon (human recombinant)    hydrOXYzine    Magnesium Standard Dose Replacement - Follow Nurse / BPA Driven Protocol    nitroglycerin    [DISCONTINUED] ondansetron ODT **OR** ondansetron    Phosphorus Replacement - Follow Nurse / BPA Driven Protocol    Potassium Replacement - Follow Nurse / BPA Driven Protocol    sodium chloride    sodium chloride    Intake/Ouptut 24 hrs (0701 - 0700)   I&O's Reviewed: yes  Intake & Output (last day)         10/21  "0701  10/22 0700 10/22 0701  10/23 0700    P.O. 30 0    I.V. (mL/kg) 592 (7.7)     Other 364     NG/ 120    IV Piggyback      Total Intake(mL/kg) 1380 (17.9) 120 (1.6)    Urine (mL/kg/hr) 700 (0.4)     Total Output 700     Net +680 +120          Urine Unmeasured Occurrence 1 x     Stool Unmeasured Occurrence 1 x           Anthropometrics     Height: Height: 172.7 cm (68\")  Last Filed Weight: Weight: 77 kg (169 lb 12.8 oz) (10/21/24 1051)  Method: Weight Method: Bed scale  BMI: BMI (Calculated): 25.8    UBW:    Weight      Weight (kg) Weight (lbs) Weight Method   4/1/2024 90.7 kg  199 lb 15.3 oz  Bed scale    4/2/2024 92.1 kg  203 lb 0.7 oz  Bed scale    5/28/2024 79.379 kg  175 lb     5/30/2024 79.379 kg  175 lb     6/6/2024 79.379 kg  175 lb     9/11/2024 79.4 kg  175 lb 0.7 oz  Estimated    9/12/2024 86.229 kg  190 lb 1.6 oz  Bed scale    9/13/2024 83.462 kg  184 lb     9/14/2024 84.505 kg  186 lb 4.8 oz     9/15/2024 84.233 kg  185 lb 11.2 oz     9/18/2024 83.734 kg  184 lb 9.6 oz  Bed scale    10/20/2024 86.183 kg  190 lb  Estimated    10/21/2024 77.021 kg  169 lb 12.8 oz  Bed scale      Weight change: KAROLYN    Nutrition Focused Physical Exam     Date: 10/21/24     Unable to perform due to Pt unable to participate at time of visit     Subjective   Reported/Observed/Food/Nutrition Related History:   10/22/24  Patient would not stay awake long enough for interview today. Did say \"no\" when asked if he had any appetite. Also, stated \"I think so\" when asked if he had his tube feeds overnight. Spoke with assigned RN, who reported patient tolerated feeds overnight, however it was reported tube clogged several times? Patient said he wasn't feeling breakfast or lunch today. Also, has refused MBS today, rescheduled for tomorrow.    10/21/24   Attempted to interview patient with assigned RN at bedside, however, patient would not awaken to participate. Spoke with COREEN Damon @ [facility] regarding patient's EN regimen. " "Reported his EN orders had been removed already, but from what she can recall he was on Glucerna, maybe @ 60ml/hr with 85ml/hr FWF x22hr and was receiving a pureed, thin liquid meal tray as well.    Needs Assessment   Date: 10/21/24     Height used:Height: 172.7 cm (68\")  Weights used: 77kg    Estimated Calorie needs: ~ 1900 Kcal/day  Method: 25-30 Kcals/KG = 9476-5356  Method: 1.0-1.2 MSJ = 6556-2153    Estimated Protein needs: ~ 70g PRO/day  Method: 0.8-1g/Kg = 61-77g pro    Estimated Fluid needs: ~ ml/day   Per clinical status  Current Nutrition Prescription   PO: Diet: Regular/House, Diabetic; Consistent Carbohydrate; Texture: Pureed (NDD 1); Fluid Consistency: [place] Thick  Oral Nutrition Supplement: n/a  Intake: 10/22 B 0% PO intake documented    EN: DiabetiSource AC  Goal Rate: 80ml/hr  Water Flushes: 15ml/hr  Modular: None  Route: PEG  Tube: 20 PEG    EN @ goal over 14hr to supply:  Goal Volume 1120  mL/day     Flush Volume 210 mL/day     Energy 1344 Kcal/day 71 % Est Need   Protein 67 g/day 96 % Est Need   Fiber 17 g/day     Water in   mL     Total Water 1128 mL     Meet DRI No        --------------------------------------------------------------------------  Product/Rate verified at bedside: n/a - nocturnal  Infusing Rate at time of visit: n/a    Average Delivery from [facility] Day:   Volume 394 mL/day   % Goal Vol.   Flush Volume 364 mL/day     Energy  Kcal/day  % Est Need   Protein  g/day  % Est Need   Fiber  g/day     Water in  EN  mL     Total Water  mL     Meet DRI No        Assessment & Plan   Nutrition Diagnosis   Date: 10/21/24  Updated:   Problem Swallowing difficulty    Etiology Oropharyngeal dysphagia   Signs/Symptoms PEG and modified PO diet   Status: New    Goal:   Nutrition to support treatment, Increase intake, and Maintain EN    Nutrition Intervention      Follow treatment progress, Care plan reviewed, Interview for preferences, Encourage intake    Encourage PO intake as " able  Continue EN regimen: Diabetisource AC @ 80ml/hr + 15ml/hr FWF  Will adjust EN/FWF PRN    Monitoring/Evaluation:   Per protocol, I&O, PO intake, Pertinent labs, EN delivery/tolerance, Weight, GI status, Symptoms, POC/GOC, Swallow function    Marija Yo MS,RD,LD  Time Spent: 30min

## 2024-10-22 NOTE — PROGRESS NOTES
[facility] Medicine Services  PROGRESS NOTE    Patient Name: Omkar Nava  : 1955  MRN: [id number]    Date of Admission: 10/20/2024  Primary Care Physician: Alberto Dye MD    Subjective   Subjective     CC: Vomiting    HPI:  No acute events over the night.  Patient was sleepy this morning.  Following some commands and answering some orientation questions.       Objective   Objective     Vital Signs:   Temp:  [96.1 °F (35.6 °C)-99 °F (37.2 °C)] 96.1 °F (35.6 °C)  Heart Rate:  [50-80] 50  Resp:  [14-18] 18  BP: (119-173)/(50-78) 123/50  Flow (L/min) (Oxygen Therapy):  [2] 2     Physical Exam:  Constitutional: No acute distress, awake, alert, answers questions  HENT: NCAT, mucous membranes moist  Respiratory: coarse BS bilaterally, respiratory effort normal   Cardiovascular: RRR, no murmurs, rubs, or gallops  Gastrointestinal: Positive bowel sounds, soft, nontender, nondistended, PEG in place, gauze around GT site.  Musculoskeletal: No bilateral ankle edema, thin extremities.  S/p toe amputation on R foot  Psychiatric: Appropriate affect, cooperative  Neurologic: Oriented x 1, inc tone in his UE -- 4/5 UE.  3/5 LE strength.  Able to follow commands symmetric in all extremities, Cranial Nerves grossly intact to confrontation, speech clear  Skin: No rashes--abrasions on his LE    Results Reviewed:  LAB RESULTS:      Lab 10/20/24  2049   WBC 8.28   HEMOGLOBIN 8.2*   HEMATOCRIT 25.3*   PLATELETS 232   NEUTROS ABS 6.79   IMMATURE GRANS (ABS) 0.05   LYMPHS ABS 0.99   MONOS ABS 0.43   EOS ABS 0.01   MCV 94.8   PROCALCITONIN 0.13   LACTATE 0.7         Lab 10/21/24  1136 10/20/24  2049   SODIUM 143 146*   POTASSIUM 5.0 5.5*   CHLORIDE 110* 110*   CO2 23.0 25.0   ANION GAP 10.0 11.0   BUN 30* 40*   CREATININE 1.12 1.32*   EGFR 72.0 59.1*   GLUCOSE 138* 130*   CALCIUM 9.5 9.6         Lab 10/20/24  2049   TOTAL PROTEIN 7.6   ALBUMIN 4.0   GLOBULIN 3.6   ALT (SGPT) 31   AST (SGOT) 22    BILIRUBIN <0.2   ALK PHOS 97   LIPASE 18                     Brief Urine Lab Results  (Last result in the past 365 days)        Color   Clarity   Blood   Leuk Est   Nitrite   Protein   CREAT   Urine HCG        10/20/24 2123 Yellow   Clear   Negative   Negative   Negative   100 mg/dL (2+)                   Microbiology Results Abnormal       Procedure Component Value - Date/Time    Blood Culture - Blood, Arm, Right [[id number]]  (Normal) Collected: 10/21/24 0107    Lab Status: Preliminary result Specimen: Blood from Arm, Right Updated: 10/22/24 0215     Blood Culture No growth at 24 hours    Blood Culture - Blood, Hand, Right [[id number]]  (Normal) Collected: 10/21/24 0107    Lab Status: Preliminary result Specimen: Blood from Hand, Right Updated: 10/22/24 0215     Blood Culture No growth at 24 hours    Urine Culture - Urine, Straight Cath [[id number]]  (Normal) Collected: 10/20/24 2123    Lab Status: Preliminary result Specimen: Urine from Straight Cath Updated: 10/21/24 1124     Urine Culture Growth present, too young to evaluate            CT Chest Without Contrast Diagnostic    Result Date: 10/20/2024  CT CHEST WO CONTRAST DIAGNOSTIC Date of Exam: 10/20/2024 9:40 PM EDT Indication: abdominal pain. Comparison: 9/11/2024. Technique: Axial CT images were obtained of the chest without contrast administration.  Reconstructed coronal and sagittal images were also obtained. Automated exposure control and iterative construction methods were used. Findings: There is a subcentimeter low-density nodule in the right thyroid lobe, which is unchanged. There are calcifications in the left thyroid lobe. There are no specific follow-up recommendations. The trachea, esophagus appear within normal limits. The heart is mildly enlarged. There are mild coronary artery calcifications. Small pericardial effusion is present. Mild aortic atherosclerosis. No mediastinal lymphadenopathy. There are persistent findings of interlobular septal  thickening in the lung bases dependently along with bibasilar dependent atelectasis. These changes could be due to hypoinflation, mild interstitial pulmonary edema or atypical pneumonia. The airways are patent. No suspicious lung nodules. No acute findings in the superficial soft tissues. Findings in the abdomen discussed in a separate report. No acute osseous abnormality or destructive bone lesion. There are mild lower cervical and diffuse thoracic degenerative changes.     Impression: Impression: 1.Persistent findings of interlobular septal thickening in the lung bases with bibasilar dependent atelectasis. This could be due to hypoinflation, mild interstitial pulmonary edema or atypical pneumonia. 2.Mild cardiomegaly and small pericardial effusion. 3.Coronary artery disease. Electronically Signed: Rayray Mcclain MD  10/20/2024 10:44 PM EDT  Workstation ID: [id number]    CT Abdomen Pelvis Without Contrast    Result Date: 10/20/2024  CT ABDOMEN PELVIS WO CONTRAST Date of Exam: 10/20/2024 9:40 PM EDT Indication: abdominal pain. Comparison: 9/11/2024. Technique: Axial CT images were obtained of the abdomen and pelvis without the administration of contrast. Reconstructed coronal and sagittal images were also obtained. Automated exposure control and iterative construction methods were used. Findings: There is stable interstitial edema in the lung bases with dependent bibasilar atelectasis. The heart is enlarged. There is diminished attenuation of the blood pool suggesting anemia. Coronary artery calcifications are partially seen. There is a percutaneous gastrostomy tube with tip in the stomach. No acute findings in the superficial soft tissues. No acute osseous abnormality or destructive bone lesion. There is 6 mm anterior spondylolisthesis of L4 on L5 and there is severe facet arthritis at  L4-L5 along with moderate disc degeneration. There are mild degenerative changes at the remainder of the lumbar segments. Mild DJD  at both hips. The liver, gallbladder, bile ducts, pancreas, stomach, duodenum and spleen appear within normal limits. No adrenal mass. Kidneys are homogeneous. No hydronephrosis. No urolithiasis. Prostate gland is normal size. There is wall thickening throughout the urinary bladder, which appears more than expected from under distention. Correlate for cystitis. The appendix is normal. There is distal colonic diverticulosis. No small bowel distention. Mild aortoiliac atherosclerosis. No aneurysm. No ascites, pneumoperitoneum or lymphadenopathy.     Impression: Impression: 1.Wall thickening of the urinary bladder, which appears more than expected from under distention. Correlate for cystitis. 2.Cardiomegaly, coronary artery disease and mild interstitial edema in the lung bases. 3.Percutaneous gastrostomy tube in place. 4.Colonic diverticulosis. 5.Lumbar degenerative changes with grade 1 anterior spondylolisthesis of L4 on L5. Electronically Signed: Rayray Mcclain MD  10/20/2024 10:42 PM EDT  Workstation ID: [id number]     Results for orders placed during the hospital encounter of 01/15/24    Adult Transthoracic Echo Complete With Contrast if Necessary Per Protocol    Interpretation Summary    Left ventricular ejection fraction appears to be 56 - 60%.    Left ventricular wall thickness is consistent with hypertrophy.    Estimated right ventricular systolic pressure from tricuspid regurgitation is mildly elevated (35-45 mmHg).    There is a small (1-2cm) pericardial effusion.    No evidence for pericardial tamponade      Current medications:  Scheduled Meds:albuterol, 2.5 mg, Nebulization, Q6H - RT  amLODIPine, 10 mg, Per PEG Tube, Q24H  ARIPiprazole, 5 mg, Per G Tube, Daily  brimonidine, 1 drop, Both Eyes, TID  carvedilol, 12.5 mg, Per PEG Tube, Q12H  cloNIDine, 0.2 mg, Per PEG Tube, Q8H  enoxaparin, 40 mg, Subcutaneous, Daily  FLUoxetine, 20 mg, Oral, Daily  hydrALAZINE, 100 mg, Per PEG Tube, Q8H  insulin regular, 2-7  Units, Subcutaneous, Q6H  lansoprazole, 30 mg, Per PEG Tube, Q AM  palliative care oral rinse, 5 mL, Mouth/Throat, 4x Daily  QUEtiapine, 25 mg, Per PEG Tube, Nightly  sodium chloride, 10 mL, Intravenous, Q12H  terazosin, 5 mg, Per G Tube, Q12H  timolol, 1 drop, Both Eyes, BID  Valproic Acid, 150 mg, Nasogastric, Q8H      Continuous Infusions:   PRN Meds:.  acetaminophen **OR** acetaminophen **OR** acetaminophen    senna-docusate sodium **AND** polyethylene glycol **AND** [DISCONTINUED] bisacodyl **AND** bisacodyl    dextrose    dextrose    glucagon (human recombinant)    hydrOXYzine    Magnesium Standard Dose Replacement - Follow Nurse / BPA Driven Protocol    nitroglycerin    [DISCONTINUED] ondansetron ODT **OR** ondansetron    Phosphorus Replacement - Follow Nurse / BPA Driven Protocol    Potassium Replacement - Follow Nurse / BPA Driven Protocol    sodium chloride    sodium chloride    Assessment & Plan   Assessment & Plan     Active Hospital Problems    Diagnosis  POA    **UTI (urinary tract infection) [N39.0]  Yes    Vomiting [R11.10]  Yes    Severe vascular dementia without behavioral disturbance, psychotic disturbance, mood disturbance, or anxiety [F01.C0]  Yes    Protein calorie malnutrition [E46]  Yes    S/P transmetatarsal amputation of foot, left [Z89.432]  Not Applicable    Anemia of chronic disease [D63.8]  Yes    Neurocognitive disorder [R41.9]  Yes    Type 2 diabetes mellitus [E11.9]  Yes    Essential hypertension [I10]  Yes    Hypertensive heart and chronic kidney disease 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 failure, with stage 1 through stage 4 chronic kidney disease, or unspecified chronic kidney disease [I13.10]  Yes    Long term (current) use of insulin [Z79.4]  Not Applicable      Resolved Hospital Problems   No resolved problems to display.        Brief Hospital Course to date:  Omkar Nava is a 69 y.o. M with a past medical history of hypertension, diabetes, chronic kidney disease, vascular dementia, dysphagia,  and chronic iron def anemia admitted with increased vomiting, with concern for possible underlying infection.    CT of the chest with persistent interlobular septal thickening at the lung bases consistent with atelectasis versus edema or an atypical pneumonia.      CT abdomen:  Constitutional: No acute distress, awake, alert, answers questions  HENT: NCAT, mucous membranes moist  Respiratory: coarse BS bilaterally, respiratory effort normal   Cardiovascular: RRR, no murmurs, rubs, or gallops  Gastrointestinal: Positive bowel sounds, soft, nontender, nondistended, PEG in place, gauze around GT site.  Musculoskeletal: No bilateral ankle edema, thin extremities.  S/p toe amputation on R foot  Psychiatric: Appropriate affect, cooperative  Neurologic: Oriented x 1, inc tone in his UE -- 4/5 UE.  3/5 LE strength.  Able to follow commands symmetric in all extremities, Cranial Nerves grossly intact to confrontation, speech clear  Skin: No rashes--abrasions on his LE    Copied text in this note has been reviewed and is accurate as of 10/22/2024    Acute encephalopathy likely secondary to infection  UTI/viral pneumonia?  --CT abdomen as above  --Unclear etiology  --Hold tube feeds, anti-emetics  --UA not convincing of UTI.  Received abx--rocephin and doxy in ED.   --Respiratory panel ordered.  -- Follow up culture results  --Lipase nl     Hyperkalemia.  Improving  --give dose of lokelma     JAYY on CKD-stage 2.  Improving  --worse from prior admission.  BUN, Na, and K is elevated.  Most likely 2/2 to dehydration from vomiting.  --Give IVF.     Chronic aspiration  - PEG tube in place  --Nutrition to recommend TF  --Pt with pureed foods, nectar thick liquids per daughter.--although this is different from ST notes from last admission  --ST consult     Chronic anemia  - At baseline, cont to monitor.     HFpEF  HTN  - Continue carvedilol, clonidine, terazosin, hydralazine  -- Hold diuretics     T2DM  - A1c 5.6 last  hospitalization  --Due to poor intake, hold levemir for now.  Cover with SSI.     Dementia with agitation  Questionable seizure disorder  - Seroquel, Depakote, Abilify, prozac  --Obtain valproic acid level.    Addendum 12:30pm:  -Respiratory panel ordered this morning and came back positive for rhino virus.  Patient on 2 L of nasal cannula satting 100%.  Afebrile.  CTA on admission was positive for atypical pneumonia.  Will check CBC, VBG and Pro-Maximino.  Patient received a dose of ceftriaxone and doxycycline on the 20th.  Monitor off antibiotic at this point pending blood work.      Expected Discharge Location and Transportation: TBD  Expected Discharge   Expected Discharge Date: 10/23/2024; Expected Discharge Time:      VTE Prophylaxis:  Pharmacologic VTE prophylaxis orders are present.         AM-PAC 6 Clicks Score (PT): 7 (10/21/24 1100)    CODE STATUS:   Code Status and Medical Interventions: CPR (Attempt to Resuscitate); Full Support   Ordered at: 10/21/24 0146     Level Of Support Discussed With:    Health Care Surrogate     Code Status (Patient has no pulse and is not breathing):    CPR (Attempt to Resuscitate)     Medical Interventions (Patient has pulse or is breathing):    Full Support       Briseyda Moscoso MD  10/22/24

## 2024-10-22 NOTE — THERAPY RE-EVALUATION
Acute Care - Speech Language Pathology   Swallow Re-Evaluation [facility]    Clinical Swallow Evaluation       Patient Name: Omkar Nava  : 1955  MRN: [id number]  Today's Date: 10/22/2024               Admit Date: 10/20/2024    Visit Dx:     ICD-10-CM ICD-9-CM   1. Acute cystitis without hematuria  N30.00 595.0   2. Pneumonia due to infectious organism, unspecified laterality, unspecified part of lung  J18.9 486   3. 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, without long-term current use of insulin    Acute osteomyelitis of left foot    Type 2 diabetes mellitus    Essential hypertension    Psychotic disorder    Neurocognitive disorder    S/P transmetatarsal amputation of foot, left    Abscess of left foot    Anemia of chronic disease    Aspiration pneumonia    Cervical spine pain    Chronic kidney disease    Closed head injury without loss of consciousness    Dementia    Dental cavities    Diarrhea of presumed infectious origin    Displaced fracture of proximal phalanx of left great toe, initial encounter for open fracture    Dysphagia    Erectile dysfunction    Generalized muscle weakness    Hallucinations    Hypertensive heart and chronic kidney disease 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 failure, with stage 1 through stage 4 chronic kidney disease, or unspecified chronic kidney disease    Insomnia due to medical condition    Iron deficiency anemia    Laceration without foreign body, left foot, subsequent encounter    Long term (current) use of insulin    Personal history of Methicillin resistant Staphylococcus aureus infection    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    Protein calorie malnutrition    Simple chronic bronchitis    Tobacco use    Type 2 diabetes mellitus with diabetic neuropathy, unspecified    Type 2 diabetes mellitus with diabetic chronic  kidney disease    Acute type 1 respiratory failure    Severe vascular dementia without behavioral disturbance, psychotic disturbance, mood disturbance, or anxiety    UTI (urinary tract infection)    Vomiting     Past Medical History:   Diagnosis Date    Anemia     Dementia     Diabetes mellitus     Dysphagia     GERD (gastroesophageal reflux disease)     History of alcohol abuse     History of cocaine use     History of marijuana use     Hypertension     Osteomyelitis     Poor historian     records obtained from nursing home records & his family    Visual impairment      Past Surgical History:   Procedure Laterality Date    AMPUTATION FOOT Left 10/18/2022    Procedure: PARTIAL FIRST RAY AMPUTATION LEFT;  Surgeon: Yeison Petty MD;  Location:  [facility];  Service: Orthopedics;  Laterality: Left;    AMPUTATION FOOT Left 12/5/2022    Procedure: Transmetatarsal of Left Foot;  Surgeon: Yeison Petty MD;  Location:  [facility];  Service: Orthopedics;  Laterality: Left;    ENDOSCOPY N/A 3/14/2024    Procedure: ESOPHAGOGASTRODUODENOSCOPY WITH JEJUNAL TUBE INSERTION AT BEDSIDE;  Surgeon: Brunner, Mark I, MD;  Location:  [facility];  Service: Gastroenterology;  Laterality: N/A;    ENDOSCOPY W/ PEG TUBE PLACEMENT N/A 4/1/2024    Procedure: ESOPHAGOGASTRODUODENOSCOPY WITH PERCUTANEOUS ENDOSCOPIC GASTROSTOMY TUBE INSERTION;  Surgeon: Brunner, Mark I, MD;  Location:  [facility];  Service: Gastroenterology;  Laterality: N/A;  EGD with PEG placement.  Secured at 3.5 cm    EYE SURGERY         SLP Recommendation and Plan  SLP Swallowing Diagnosis: oral dysphagia, suspected pharyngeal dysphagia (10/22/24 1000)  SLP Diet Recommendation: puree, nectar thick liquids, long term alternate methods of nutrition/hydration (10/22/24 1000)  Recommended Precautions and Strategies: upright posture during/after eating, small bites of food and sips of liquid, general aspiration precautions, reflux precautions, assist with feeding (10/22/24  1000)  SLP Rec. for Method of Medication Administration: meds whole, meds crushed, with puree, meds via alternate route (10/22/24 1000)     Monitor for Signs of Aspiration: yes, notify SLP if any concerns (10/22/24 1000)  Recommended Diagnostics: [facility]SS ([facility] [place]), other (see comments) (Patient stated that he is agreeable to go to [facility] tomorrow. Declined this date) (10/22/24 1000)  Swallow Criteria for Skilled Therapeutic Interventions Met: demonstrates skilled criteria (10/22/24 1000)  Anticipated Discharge Disposition (SLP): skilled nursing facility (10/22/24 1000)  Rehab Potential/Prognosis, Swallowing: adequate, monitor progress closely (10/22/24 1000)  Therapy Frequency (Swallow): evaluation only (10/22/24 1000)     Oral Care Recommendations: Oral Care BID/PRN, Suction toothbrush (10/22/24 1000)                                               SWALLOW EVALUATION (Last 72 Hours)       SLP Adult Swallow Evaluation       Row Name 10/22/24 1000 10/21/24 1350                Rehab Evaluation    Document Type re-evaluation  - evaluation  -CJ       Subjective Information no complaints  - no complaints  -CJ       Patient Observations poorly cooperative  - cooperative  -CJ       Patient/Family/Caregiver Comments/Observations none present  - no family present  -       Patient Effort adequate  - adequate  -       Symptoms Noted During/After Treatment none  -CH none  -CJ       Oral Care patient refused intervention  - suction provided  -          General Information    Patient Profile Reviewed yes  -CH yes  -CJ       Pertinent History Of Current Problem see initial evaluation. MBS scheduled for this date, however, pt declined, stating he doesn't feel well. SLP f/u completed to assess patient with current pureed and nectar thick liquid diet as pt did not eat am meal.  -CH Pt adm w/ UTI/vomitting; sig h/o dementia, HTN, HTN, CKD, aspiration PNA, DM2 bronchitis. Most recent MBS on 9/13/24 w/ recs for [facility] ground w/  thins. Reported to nursing pt has been on puree/nectar at [facility]  -       Current Method of Nutrition pureed;nectar/syrup-thick liquids  - pureed;nectar/syrup-thick liquids  -       Precautions/Limitations, Vision vision impairment, bilaterally  - vision impairment, bilaterally  -       Precautions/Limitations, Hearing WFL;for purposes of eval  - WFL;for purposes of eval  -       Prior Level of Function-Communication cognitive-linguistic impairment  - cognitive-linguistic impairment  -       Prior Level of Function-Swallowing mechanical ground textures;thin liquids;alternative feeding method;other (see comments)  has PEG  - mechanical ground textures;thin liquids;alternative feeding method  has PEG  -       Plans/Goals Discussed with patient  - patient  -       Barriers to Rehab medically complex;visual deficit;previous functional deficit;cognitive status  - medically complex;visual deficit;previous functional deficit;cognitive status  -       Patient's Goals for Discharge no concerns voiced  - no concerns voiced  -          Pain    Pretreatment Pain Rating 0/10 - no pain  - --       Posttreatment Pain Rating 0/10 - no pain  - --       Additional Documentation -- Pain Scale: FACES Pre/Post-Treatment (Group)  -          Pain Scale: FACES Pre/Post-Treatment    Pain: FACES Scale, Pretreatment -- 0-->no hurt  -       Posttreatment Pain Rating -- 0-->no hurt  -          Oral Motor Structure and Function    Dentition Assessment missing teeth  - missing teeth  -       Secretion Management anterior loss  - anterior loss  -       Mucosal Quality sticky  - sticky  -          Oral Musculature and Cranial Nerve Assessment    Oral Motor General Assessment generalized oral motor weakness  - generalized oral motor weakness  -          General Eating/Swallowing Observations    Respiratory Support Currently in Use nasal cannula  - nasal cannula  -       O2 Liters  2L  -CH 2L  -CJ       Eating/Swallowing Skills fed by SLP;unable to perform self-feeding  - fed by SLP;unable to perform self-feeding  -       Positioning During Eating upright 90 degree;upright in bed  - upright in bed  -       Utensils Used spoon;cup;straw  - spoon;cup;straw  -       Consistencies Trialed pureed;thin liquids;ice chips;nectar/syrup-thick liquids  - pureed;thin liquids;ice chips;nectar/syrup-thick liquids  -          Clinical Swallow Eval    Oral Prep Phase impaired  - impaired  -       Oral Transit impaired  - impaired  -       Oral Residue WFL  -CH WFL  -CJ       Pharyngeal Phase suspected pharyngeal impairment  - suspected pharyngeal impairment  -       Esophageal Phase unremarkable  - unremarkable  -          Oral Prep Concerns    Oral Prep Concerns increased prep time;incomplete or weak lip closure around spoon  - increased prep time  -       Increased Prep Time pudding  - pudding  -          Oral Transit Concerns    Oral Transit Concerns delayed initiation of bolus transit  - delayed initiation of bolus transit  -       Delayed Intiation of Bolus Transit all consistencies  - all consistencies  -          Pharyngeal Phase Concerns    Pharyngeal Phase Concerns throat clear;wet vocal quality  - throat clear;wet vocal quality  -       Wet Vocal Quality thin  - thin  -       Throat Clear thin  - thin  -       Pharyngeal Phase Concerns, Comment -- No s/s w/ trials of nectar thick liquids, Pt declined to accept trials of solids. okay for puree/nectar when fully awake/alert today and plan to complete MBS tomorrow. If any concerns please make NPO and utilize PEG tube  -          Swallowing Quality of Life Assessment    Education and counseling provided Risks of aspiration;Oral care recommendations and rationale  - --          SLP Evaluation Clinical Impression    SLP Swallowing Diagnosis oral dysphagia;suspected pharyngeal dysphagia  -  oral dysphagia;suspected pharyngeal dysphagia  -       Functional Impact risk of aspiration/pneumonia  - risk of aspiration/pneumonia  -       Rehab Potential/Prognosis, Swallowing adequate, monitor progress closely  - adequate, monitor progress closely  -       Swallow Criteria for Skilled Therapeutic Interventions Met demonstrates skilled criteria  - demonstrates skilled criteria  -          Recommendations    Therapy Frequency (Swallow) evaluation only  - --       SLP Diet Recommendation puree;nectar thick liquids;long term alternate methods of nutrition/hydration  - puree;nectar thick liquids;long term alternate methods of nutrition/hydration  -       Recommended Diagnostics [place]other (see comments)  Patient stated that he is agreeable to [place] [facility] [place]. Declined this date  - [facility] [place]  -       Recommended Precautions and Strategies upright posture during/after eating;small bites of food and sips of liquid;general aspiration precautions;reflux precautions;assist with feeding  - upright posture during/after eating;small bites of food and sips of liquid;general aspiration precautions;reflux precautions;assist with feeding  -       Oral Care Recommendations Oral Care BID/PRN;Suction toothbrush  - Oral Care BID/PRN;Suction toothbrush  -       SLP Rec. for Method of Medication Administration meds whole;meds crushed;with puree;meds via alternate route  - meds whole;meds crushed;with puree;meds via alternate route  -       Monitor for Signs of Aspiration yes;notify SLP if any concerns  - yes;notify SLP if any concerns  -       Anticipated Discharge Disposition (SLP) skilled nursing facility  - skilled nursing facility  -                 User Key  (r) = Recorded By, (t) = Taken By, (c) = Cosigned By      Initials Name Effective Dates     Eliz Cartagena MS CCC-SLP 06/03/24 - 10/21/24     Maya Riddle MS CCC-SLP 06/16/21 -                      EDUCATION  The patient has been educated in the following areas:   Dysphagia (Swallowing Impairment) Oral Care/Hydration Modified Diet Instruction.                Time Calculation:    Time Calculation- SLP       Row Name 10/22/24 1140             Time Calculation- SLP    SLP Start Time 1000  -CH      SLP Received On 10/22/24  -CH         Untimed Charges    SLP Eval/Re-eval  ST Eval Oral Pharyng Swallow - 19383  -CH      72031-WE Eval Oral Pharyng Swallow Minutes 40  -CH         Total Minutes    Untimed Charges Total Minutes 40  -CH       Total Minutes 40  -CH                User Key  (r) = Recorded By, (t) = Taken By, (c) = Cosigned By      Initials Name Provider Type     Maya Riddle MS CCC-SLP Speech and Language Pathologist                    Therapy Charges for Today       Code Description Service Date Service Provider Modifiers Qty    [id number]  ST EVAL ORAL PHARYNG SWALLOW 3 10/22/2024 Maya Riddle MS CCC-SLP GN 1                 Maya Riddle MS CCC-SLP  10/22/2024

## 2024-10-22 NOTE — PLAN OF CARE
Goal Outcome Evaluation:                   Anticipated Discharge Disposition (SLP): skilled nursing facility          SLP Swallowing Diagnosis: oral dysphagia, suspected pharyngeal dysphagia (10/22/24 1000)

## 2024-10-23 ENCOUNTER — APPOINTMENT (OUTPATIENT)
Dept: GENERAL RADIOLOGY | Facility: HOSPITAL | Age: 69
End: 2024-10-23
Payer: MEDICARE

## 2024-10-23 LAB
ALBUMIN SERPL-MCNC: 3.2 G/DL (ref 3.5–5.2)
ALBUMIN/GLOB SERPL: 1.2 G/DL
ALP SERPL-CCNC: 92 U/L (ref 39–117)
ALT SERPL W P-5'-P-CCNC: 22 U/L (ref 1–41)
ANION GAP SERPL CALCULATED.3IONS-SCNC: 10 MMOL/L (ref 5–15)
AST SERPL-CCNC: 13 U/L (ref 1–40)
BACTERIA SPEC AEROBE CULT: ABNORMAL
BASOPHILS # BLD AUTO: 0.02 10*3/MM3 (ref 0–0.2)
BASOPHILS NFR BLD AUTO: 0.1 % (ref 0–1.5)
BILIRUB SERPL-MCNC: <0.2 MG/DL (ref 0–1.2)
BUN SERPL-MCNC: 33 MG/DL (ref 8–23)
BUN/CREAT SERPL: 26.4 (ref 7–25)
CALCIUM SPEC-SCNC: 8.8 MG/DL (ref 8.6–10.5)
CHLORIDE SERPL-SCNC: 107 MMOL/L (ref 98–107)
CO2 SERPL-SCNC: 25 MMOL/L (ref 22–29)
CREAT SERPL-MCNC: 1.25 MG/DL (ref 0.76–1.27)
DEPRECATED RDW RBC AUTO: 48.2 FL (ref 37–54)
EGFRCR SERPLBLD CKD-EPI 2021: 63.1 ML/MIN/1.73
EOSINOPHIL # BLD AUTO: 0.02 10*3/MM3 (ref 0–0.4)
EOSINOPHIL NFR BLD AUTO: 0.1 % (ref 0.3–6.2)
ERYTHROCYTE [DISTWIDTH] IN BLOOD BY AUTOMATED COUNT: 14.2 % (ref 12.3–15.4)
GLOBULIN UR ELPH-MCNC: 2.7 GM/DL
GLUCOSE BLDC GLUCOMTR-MCNC: 136 MG/DL (ref 70–130)
GLUCOSE BLDC GLUCOMTR-MCNC: 158 MG/DL (ref 70–130)
GLUCOSE BLDC GLUCOMTR-MCNC: 192 MG/DL (ref 70–130)
GLUCOSE BLDC GLUCOMTR-MCNC: 204 MG/DL (ref 70–130)
GLUCOSE SERPL-MCNC: 153 MG/DL (ref 65–99)
HCT VFR BLD AUTO: 25.7 % (ref 37.5–51)
HGB BLD-MCNC: 8.5 G/DL (ref 13–17.7)
IMM GRANULOCYTES # BLD AUTO: 0.07 10*3/MM3 (ref 0–0.05)
IMM GRANULOCYTES NFR BLD AUTO: 0.5 % (ref 0–0.5)
LYMPHOCYTES # BLD AUTO: 1.19 10*3/MM3 (ref 0.7–3.1)
LYMPHOCYTES NFR BLD AUTO: 8.5 % (ref 19.6–45.3)
MCH RBC QN AUTO: 30.8 PG (ref 26.6–33)
MCHC RBC AUTO-ENTMCNC: 33.1 G/DL (ref 31.5–35.7)
MCV RBC AUTO: 93.1 FL (ref 79–97)
MONOCYTES # BLD AUTO: 1.11 10*3/MM3 (ref 0.1–0.9)
MONOCYTES NFR BLD AUTO: 7.9 % (ref 5–12)
NEUTROPHILS NFR BLD AUTO: 11.6 10*3/MM3 (ref 1.7–7)
NEUTROPHILS NFR BLD AUTO: 82.9 % (ref 42.7–76)
NRBC BLD AUTO-RTO: 0 /100 WBC (ref 0–0.2)
PLATELET # BLD AUTO: 221 10*3/MM3 (ref 140–450)
PMV BLD AUTO: 11.1 FL (ref 6–12)
POTASSIUM SERPL-SCNC: 4.6 MMOL/L (ref 3.5–5.2)
PROT SERPL-MCNC: 5.9 G/DL (ref 6–8.5)
RBC # BLD AUTO: 2.76 10*6/MM3 (ref 4.14–5.8)
SODIUM SERPL-SCNC: 142 MMOL/L (ref 136–145)
WBC NRBC COR # BLD AUTO: 14.01 10*3/MM3 (ref 3.4–10.8)

## 2024-10-23 PROCEDURE — 94799 UNLISTED PULMONARY SVC/PX: CPT

## 2024-10-23 PROCEDURE — 97162 PT EVAL MOD COMPLEX 30 MIN: CPT

## 2024-10-23 PROCEDURE — 85025 COMPLETE CBC W/AUTO DIFF WBC: CPT | Performed by: HOSPITALIST

## 2024-10-23 PROCEDURE — 63710000001 INSULIN LISPRO (HUMAN) PER 5 UNITS: Performed by: HOSPITALIST

## 2024-10-23 PROCEDURE — 97530 THERAPEUTIC ACTIVITIES: CPT

## 2024-10-23 PROCEDURE — 92611 MOTION FLUOROSCOPY/SWALLOW: CPT

## 2024-10-23 PROCEDURE — 99232 SBSQ HOSP IP/OBS MODERATE 35: CPT | Performed by: STUDENT IN AN ORGANIZED HEALTH CARE EDUCATION/TRAINING PROGRAM

## 2024-10-23 PROCEDURE — 82948 REAGENT STRIP/BLOOD GLUCOSE: CPT

## 2024-10-23 PROCEDURE — 74230 X-RAY XM SWLNG FUNCJ C+: CPT

## 2024-10-23 PROCEDURE — 25010000002 ENOXAPARIN PER 10 MG: Performed by: PEDIATRICS

## 2024-10-23 PROCEDURE — 94664 DEMO&/EVAL PT USE INHALER: CPT

## 2024-10-23 PROCEDURE — 80053 COMPREHEN METABOLIC PANEL: CPT | Performed by: HOSPITALIST

## 2024-10-23 PROCEDURE — 97110 THERAPEUTIC EXERCISES: CPT

## 2024-10-23 RX ORDER — AMOXICILLIN 875 MG/1
875 TABLET, COATED ORAL EVERY 12 HOURS SCHEDULED
Status: DISCONTINUED | OUTPATIENT
Start: 2024-10-23 | End: 2024-10-26

## 2024-10-23 RX ORDER — AMOXICILLIN 875 MG/1
875 TABLET, COATED ORAL EVERY 12 HOURS SCHEDULED
Status: DISCONTINUED | OUTPATIENT
Start: 2024-10-23 | End: 2024-10-23

## 2024-10-23 RX ADMIN — BRIMONIDINE TARTRATE 1 DROP: 2 SOLUTION/ DROPS OPHTHALMIC at 17:23

## 2024-10-23 RX ADMIN — AMLODIPINE BESYLATE 10 MG: 10 TABLET ORAL at 08:10

## 2024-10-23 RX ADMIN — VALPROIC ACID 150 MG: 250 SOLUTION ORAL at 21:27

## 2024-10-23 RX ADMIN — CLONIDINE HYDROCHLORIDE 0.2 MG: 0.1 TABLET ORAL at 14:50

## 2024-10-23 RX ADMIN — Medication 10 ML: at 21:27

## 2024-10-23 RX ADMIN — ARIPIPRAZOLE 5 MG: 5 TABLET ORAL at 08:12

## 2024-10-23 RX ADMIN — TERAZOSIN HYDROCHLORIDE 5 MG: 5 CAPSULE ORAL at 08:10

## 2024-10-23 RX ADMIN — AMOXICILLIN 875 MG: 875 TABLET, FILM COATED ORAL at 21:26

## 2024-10-23 RX ADMIN — TIMOLOL MALEATE 1 DROP: 5 SOLUTION/ DROPS OPHTHALMIC at 08:13

## 2024-10-23 RX ADMIN — ALBUTEROL SULFATE 2.5 MG: 2.5 SOLUTION RESPIRATORY (INHALATION) at 07:01

## 2024-10-23 RX ADMIN — ALBUTEROL SULFATE 2.5 MG: 2.5 SOLUTION RESPIRATORY (INHALATION) at 18:33

## 2024-10-23 RX ADMIN — VALPROIC ACID 150 MG: 250 SOLUTION ORAL at 05:58

## 2024-10-23 RX ADMIN — BARIUM SULFATE 100 ML: 0.81 POWDER, FOR SUSPENSION ORAL at 13:43

## 2024-10-23 RX ADMIN — TERAZOSIN HYDROCHLORIDE 5 MG: 5 CAPSULE ORAL at 21:26

## 2024-10-23 RX ADMIN — AMOXICILLIN 875 MG: 875 TABLET, FILM COATED ORAL at 14:50

## 2024-10-23 RX ADMIN — VALPROIC ACID 150 MG: 250 SOLUTION ORAL at 14:52

## 2024-10-23 RX ADMIN — INSULIN LISPRO 2 UNITS: 100 INJECTION, SOLUTION INTRAVENOUS; SUBCUTANEOUS at 17:21

## 2024-10-23 RX ADMIN — INSULIN LISPRO 2 UNITS: 100 INJECTION, SOLUTION INTRAVENOUS; SUBCUTANEOUS at 08:24

## 2024-10-23 RX ADMIN — LANSOPRAZOLE 30 MG: 15 TABLET, ORALLY DISINTEGRATING ORAL at 05:58

## 2024-10-23 RX ADMIN — TIMOLOL MALEATE 1 DROP: 5 SOLUTION/ DROPS OPHTHALMIC at 21:25

## 2024-10-23 RX ADMIN — BRIMONIDINE TARTRATE 1 DROP: 2 SOLUTION/ DROPS OPHTHALMIC at 08:13

## 2024-10-23 RX ADMIN — ENOXAPARIN SODIUM 40 MG: 100 INJECTION SUBCUTANEOUS at 08:07

## 2024-10-23 RX ADMIN — BRIMONIDINE TARTRATE 1 DROP: 2 SOLUTION/ DROPS OPHTHALMIC at 21:25

## 2024-10-23 RX ADMIN — HYDRALAZINE HYDROCHLORIDE 100 MG: 50 TABLET ORAL at 08:08

## 2024-10-23 RX ADMIN — QUETIAPINE FUMARATE 25 MG: 25 TABLET ORAL at 21:26

## 2024-10-23 RX ADMIN — CARVEDILOL 12.5 MG: 6.25 TABLET, FILM COATED ORAL at 08:12

## 2024-10-23 RX ADMIN — CLONIDINE HYDROCHLORIDE 0.2 MG: 0.1 TABLET ORAL at 08:11

## 2024-10-23 RX ADMIN — BARIUM SULFATE 20 ML: 400 PASTE ORAL at 13:43

## 2024-10-23 RX ADMIN — HYDRALAZINE HYDROCHLORIDE 100 MG: 50 TABLET ORAL at 14:50

## 2024-10-23 RX ADMIN — ALBUTEROL SULFATE 2.5 MG: 2.5 SOLUTION RESPIRATORY (INHALATION) at 12:17

## 2024-10-23 RX ADMIN — BARIUM SULFATE 50 ML: 400 SUSPENSION ORAL at 13:43

## 2024-10-23 NOTE — PROGRESS NOTES
Clinical Nutrition     Patient Name: Omkar Nava  YOB: 1955  MRN: [id number]  Date of Encounter: 10/23/24 16:01 EDT  Admission date: 10/20/2024  Reason for Visit: Follow-up protocol, EN    Assessment   Nutrition Assessment   Admission Diagnosis:  UTI (urinary tract infection) [N39.0]  Vomiting [R11.10]  Acute encephalopathy [G93.40]    Problem List:    UTI (urinary tract infection)    Type 2 diabetes mellitus    Essential hypertension    Neurocognitive disorder    S/P transmetatarsal amputation of foot, left    Anemia of chronic disease    Hypertensive heart and chronic kidney disease 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 failure, with stage 1 through stage 4 chronic kidney disease, or unspecified chronic kidney disease    Long term (current) use of insulin    Protein calorie malnutrition    Severe vascular dementia without behavioral disturbance, psychotic disturbance, mood disturbance, or anxiety    Vomiting    Acute encephalopathy      PMH:   He  has a past medical history of Anemia, Dementia, Diabetes mellitus, Dysphagia, GERD (gastroesophageal reflux disease), History of alcohol abuse, History of cocaine use, History of marijuana use, Hypertension, Osteomyelitis, Poor historian, and Visual impairment.    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    Applicable Nutrition History:   CKD 2  T2DM, insulin use  HFpEF  Seizure disorder  Vascular dementia w/ agitation  Dysphagia s/p PEG (4/2024)    (10/22)  SLP: refused MBS today, agreeable for tomorrow; puree, nectar thick liquids, long term alternate methods of nutrition/hydration   (10/23) SLP MBS: NPO, long term alternate methods of nutrition/hydration     EN regimen: modified to continuous    Labs    Labs Reviewed: Yes    Results from last 7 days   Lab Units  10/23/24  0943 10/21/24  1136 10/20/24  2049   GLUCOSE mg/dL 153* 138* 130*   BUN mg/dL 33* 30* 40*   CREATININE mg/dL 1.25 1.12 1.32*   SODIUM mmol/L 142 143 146*   CHLORIDE mmol/L 107 110* 110*   POTASSIUM mmol/L 4.6 5.0 5.5*   ALT (SGPT) U/L 22  --  31   LACTATE mmol/L  --   --  0.7     Results from last 7 days   Lab Units 10/23/24  0943 10/20/24  2049   ALBUMIN g/dL 3.2* 4.0     Results from last 7 days   Lab Units 10/23/24  1125 10/23/24  0757 10/22/24  2209 10/22/24  1607 10/22/24  1323 10/22/24  0718   GLUCOSE mg/dL 136* 192* 178* 123 120 161*     Lab Results   Lab Value Date/Time    HGBA1C 5.60 09/15/2024 0658    HGBA1C 7.00 (H) 10/16/2022 0432    HGBA1C 8.7 (H) 06/25/2022 0242    HGBA1C 13.4 04/14/2022 1058     Medications    Medications Reviewed: yes    Scheduled Meds:albuterol, 2.5 mg, Nebulization, Q6H - RT  amLODIPine, 10 mg, Per PEG Tube, Q24H  amoxicillin, 875 mg, Per PEG Tube, Q12H  ARIPiprazole, 5 mg, Per G Tube, Daily  brimonidine, 1 drop, Both Eyes, TID  carvedilol, 12.5 mg, Per PEG Tube, Q12H  cloNIDine, 0.2 mg, Per PEG Tube, Q8H  enoxaparin, 40 mg, Subcutaneous, Daily  FLUoxetine, 20 mg, Per PEG Tube, Daily  hydrALAZINE, 100 mg, Per PEG Tube, Q8H  insulin lispro, 2-7 Units, Subcutaneous, 4x Daily AC & at Bedtime  lansoprazole, 30 mg, Per PEG Tube, Q AM  palliative care oral rinse, 5 mL, Mouth/Throat, 4x Daily  QUEtiapine, 25 mg, Per PEG Tube, Nightly  sodium chloride, 10 mL, Intravenous, Q12H  terazosin, 5 mg, Per G Tube, Q12H  timolol, 1 drop, Both Eyes, BID  Valproic Acid, 150 mg, Nasogastric, Q8H      Continuous Infusions:   PRN Meds:.  acetaminophen **OR** acetaminophen **OR** acetaminophen    senna-docusate sodium **AND** polyethylene glycol **AND** [DISCONTINUED] bisacodyl **AND** bisacodyl    dextrose    glucagon (human recombinant)    hydrOXYzine    Magnesium Standard Dose Replacement - Follow Nurse / BPA Driven Protocol    nitroglycerin    [DISCONTINUED] ondansetron ODT **OR**  "ondansetron    Phosphorus Replacement - Follow Nurse / BPA Driven Protocol    Potassium Replacement - Follow Nurse / BPA Driven Protocol    sodium chloride    sodium chloride    Intake/Ouptut 24 hrs (0701 - 0700)   I&O's Reviewed: yes  Intake & Output (last day)         10/22 0701  10/23 0700 10/23 0701  10/24 0700    P.O. 100     I.V. (mL/kg)      Other 30 207    NG/ 994    Total Intake(mL/kg) 335 (4.4) 1201 (15.6)    Urine (mL/kg/hr)      Total Output      Net +335 +1201          Stool Unmeasured Occurrence 1 x           Anthropometrics     Height: Height: 172.7 cm (68\")  Last Filed Weight: Weight: 77 kg (169 lb 12.8 oz) (10/21/24 1051)  Method: Weight Method: Bed scale  BMI: BMI (Calculated): 25.8    UBW:    Weight      Weight (kg) Weight (lbs) Weight Method   4/1/2024 90.7 kg  199 lb 15.3 oz  Bed scale    4/2/2024 92.1 kg  203 lb 0.7 oz  Bed scale    5/28/2024 79.379 kg  175 lb     5/30/2024 79.379 kg  175 lb     6/6/2024 79.379 kg  175 lb     9/11/2024 79.4 kg  175 lb 0.7 oz  Estimated    9/12/2024 86.229 kg  190 lb 1.6 oz  Bed scale    9/13/2024 83.462 kg  184 lb     9/14/2024 84.505 kg  186 lb 4.8 oz     9/15/2024 84.233 kg  185 lb 11.2 oz     9/18/2024 83.734 kg  184 lb 9.6 oz  Bed scale    10/20/2024 86.183 kg  190 lb  Estimated    10/21/2024 77.021 kg  169 lb 12.8 oz  Bed scale      Weight change: KAROLYN    Nutrition Focused Physical Exam     Date: 10/21/24     Unable to perform due to Pt unable to participate at time of visit     Subjective   Reported/Observed/Food/Nutrition Related History:   10/23/24  Patient failed MBS today with SLP. Will modify EN regimen to continuous to meet 100% of daily needs.    10/22/24  Patient would not stay awake long enough for interview today. Did say \"no\" when asked if he had any appetite. Also, stated \"I think so\" when asked if he had his tube feeds overnight. Spoke with assigned RN, who reported patient tolerated feeds overnight, however it was reported tube clogged " "several times? Patient said he wasn't feeling breakfast or lunch today. Also, has refused MBS today, rescheduled for tomorrow.    10/21/24   Attempted to interview patient with assigned RN at bedside, however, patient would not awaken to participate. Spoke with Marisol RN @ [facility] regarding patient's EN regimen. Reported his EN orders had been removed already, but from what she can recall he was on Glucerna, maybe @ 60ml/hr with 85ml/hr FWF x22hr and was receiving a pureed, thin liquid meal tray as well.    Needs Assessment   Date: 10/21/24, 10/23    Height used:Height: 172.7 cm (68\")  Weights used: 77kg    Estimated Calorie needs: ~ 1900 Kcal/day  Method: 25-30 Kcals/KG = 4733-3111  Method: 1.0-1.2 MSJ = 7176-1049    Estimated Protein needs: ~ 90g PRO/day  Method: 0.8-1.2g/Kg = 61-92g pro    Estimated Fluid needs: ~ ml/day   Per clinical status  Current Nutrition Prescription   PO: Diet: Regular/House, Diabetic; Consistent Carbohydrate; Texture: Pureed (NDD 1); Fluid Consistency: [place] Thick  Oral Nutrition Supplement: n/a  Intake: N/A    EN: DiabetiSource AC  Goal Rate: 80ml/hr  Water Flushes: 15ml/hr  Modular: None  Route: PEG  Tube: 20 PEG    EN @ goal over 14hr to supply:  Goal Volume 1120  mL/day     Flush Volume 210 mL/day     Energy 1344 Kcal/day 71 % Est Need   Protein 67 g/day 96 % Est Need   Fiber 17 g/day     Water in   mL     Total Water 1128 mL     Meet DRI No        --------------------------------------------------------------------------  Product/Rate verified at bedside: n/a - nocturnal  Infusing Rate at time of visit: n/a    Average Delivery from [facility] Day:   Volume 85 mL/day   % Goal Vol.   Flush Volume 30 mL/day     Energy  Kcal/day  % Est Need   Protein  g/day  % Est Need   Fiber  g/day     Water in  EN  mL     Total Water  mL     Meet DRI         Assessment & Plan   Nutrition Diagnosis   Date: 10/21/24  Updated:   Problem Swallowing difficulty    Etiology Oropharyngeal " dysphagia   Signs/Symptoms PEG and modified PO diet   Status: New    Goal:   Nutrition to support treatment and Adjust EN, Deliver estimated needs    Nutrition Intervention      Follow treatment progress, Care plan reviewed, Nutrition support order placed    Adjust EN regimen to continuous feeds: Diabetisource AC @ 70ml/hr + 15ml/hr FWF  Will adjust EN/FWF PRN  EN @ goal over 22hrs to supply:  Goal Volume 1540 mL/day     Flush Volume 330 mL/day     Energy 1848 Kcal/day 97 % Est Need   Protein 92 g/day 102 % Est Need   Fiber 23 g/day     Water in  EN 1263 mL     Total Water 1593 mL     Meet DRI Y        Monitoring/Evaluation:   Per protocol, I&O, Pertinent labs, EN delivery/tolerance, Weight, GI status, Symptoms, POC/GOC, Swallow function    Marija Yo MS,RD,LD  Time Spent: 35min

## 2024-10-23 NOTE — MBS/VFSS/FEES
Acute Care - Speech Language Pathology   Swallow Re-Evaluation  Geraldo  Modified Barium Swallow Study (MBS)       Patient Name: Omkar Nava  : 1955  MRN: [id number]  Today's Date: 10/23/2024               Admit Date: 10/20/2024    Visit Dx:     ICD-10-CM ICD-9-CM   1. Acute cystitis without hematuria  N30.00 595.0   2. Pneumonia due to infectious organism, unspecified laterality, unspecified part of lung  J18.9 486   3. 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, without long-term current use of insulin    Acute osteomyelitis of left foot    Type 2 diabetes mellitus    Essential hypertension    Psychotic disorder    Neurocognitive disorder    S/P transmetatarsal amputation of foot, left    Abscess of left foot    Anemia of chronic disease    Aspiration pneumonia    Cervical spine pain    Chronic kidney disease    Closed head injury without loss of consciousness    Dementia    Dental cavities    Diarrhea of presumed infectious origin    Displaced fracture of proximal phalanx of left great toe, initial encounter for open fracture    Dysphagia    Erectile dysfunction    Generalized muscle weakness    Hallucinations    Hypertensive heart and chronic kidney disease 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 failure, with stage 1 through stage 4 chronic kidney disease, or unspecified chronic kidney disease    Insomnia due to medical condition    Iron deficiency anemia    Laceration without foreign body, left foot, subsequent encounter    Long term (current) use of insulin    Personal history of Methicillin resistant Staphylococcus aureus infection    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    Protein calorie malnutrition    Simple chronic bronchitis    Tobacco use    Type 2 diabetes mellitus with diabetic neuropathy, unspecified    Type 2 diabetes mellitus with diabetic  chronic kidney disease    Acute type 1 respiratory failure    Severe vascular dementia without behavioral disturbance, psychotic disturbance, mood disturbance, or anxiety    UTI (urinary tract infection)    Vomiting    Acute encephalopathy     Past Medical History:   Diagnosis Date    Anemia     Dementia     Diabetes mellitus     Dysphagia     GERD (gastroesophageal reflux disease)     History of alcohol abuse     History of cocaine use     History of marijuana use     Hypertension     Osteomyelitis     Poor historian     records obtained from nursing home records & his family    Visual impairment      Past Surgical History:   Procedure Laterality Date    AMPUTATION FOOT Left 10/18/2022    Procedure: PARTIAL FIRST RAY AMPUTATION LEFT;  Surgeon: Yeison Petty MD;  Location:  [facility];  Service: Orthopedics;  Laterality: Left;    AMPUTATION FOOT Left 12/5/2022    Procedure: Transmetatarsal of Left Foot;  Surgeon: Yeison Petty MD;  Location:  [facility];  Service: Orthopedics;  Laterality: Left;    ENDOSCOPY N/A 3/14/2024    Procedure: ESOPHAGOGASTRODUODENOSCOPY WITH JEJUNAL TUBE INSERTION AT BEDSIDE;  Surgeon: Brunner, Mark I, MD;  Location:  [facility];  Service: Gastroenterology;  Laterality: N/A;    ENDOSCOPY W/ PEG TUBE PLACEMENT N/A 4/1/2024    Procedure: ESOPHAGOGASTRODUODENOSCOPY WITH PERCUTANEOUS ENDOSCOPIC GASTROSTOMY TUBE INSERTION;  Surgeon: Brunner, Mark I, MD;  Location:  [facility];  Service: Gastroenterology;  Laterality: N/A;  EGD with PEG placement.  Secured at 3.5 cm    EYE SURGERY         SLP Recommendation and Plan  SLP Swallowing Diagnosis: mod-severe, oral dysphagia, suspected pharyngeal dysphagia (10/23/24 1320)  SLP Diet Recommendation: NPO, long term alternate methods of nutrition/hydration (10/23/24 1320)     SLP Rec. for Method of Medication Administration: meds via alternate route (10/23/24 1320)     Monitor for Signs of Aspiration: notify SLP if any concerns (10/23/24  1320)  Recommended Diagnostics: reassess via clinical swallow evaluation, other (see comments) (when consistently awake and alert) (10/23/24 1320)  Swallow Criteria for Skilled Therapeutic Interventions Met: demonstrates skilled criteria (10/23/24 1320)  Anticipated Discharge Disposition (SLP): skilled nursing facility (10/23/24 1320)  Rehab Potential/Prognosis, Swallowing: re-evaluate goals as necessary (10/23/24 1320)        Oral Care Recommendations: Oral Care BID/PRN, Suction toothbrush (10/23/24 1320)                                        Progress: declining      SWALLOW EVALUATION (Last 72 Hours)       SLP Adult Swallow Evaluation       Row Name 10/23/24 1320 10/22/24 1000 10/21/24 1350             Rehab Evaluation    Document Type evaluation  -RS re-evaluation  - evaluation  -      Subjective Information no complaints  -RS no complaints  -CH no complaints  -CJ      Patient Observations lethargic;poorly cooperative  -RS poorly cooperative  -CH cooperative  -CJ      Patient/Family/Caregiver Comments/Observations none present in radiology  -RS none present  -CH no family present  -CJ      Patient Effort fair  -RS adequate  -CH adequate  -CJ      Symptoms Noted During/After Treatment none  -RS none  -CH none  -CJ      Oral Care -- patient refused intervention  -CH suction provided  -CJ         General Information    Patient Profile Reviewed -- yes  -CH yes  -CJ      Pertinent History Of Current Problem -- see initial evaluation. MBS scheduled for this date, however, pt declined, stating he doesn't feel well. SLP f/u completed to assess patient with current pureed and nectar thick liquid diet as pt did not eat am meal.  -CH Pt adm w/ UTI/vomitting; sig h/o dementia, HTN, HTN, CKD, aspiration PNA, DM2 bronchitis. Most recent MBS on 9/13/24 w/ recs for [facility] ground w/ thins. Reported to nursing pt has been on puree/nectar at [facility]  -      Current Method of Nutrition -- pureed;nectar/syrup-thick liquids   - pureed;nectar/syrup-thick liquids  -CJ      Precautions/Limitations, Vision -- vision impairment, bilaterally  -CH vision impairment, bilaterally  -CJ      Precautions/Limitations, Hearing -- WFL;for purposes of eval  - WFL;for purposes of eval  -      Prior Level of Function-Communication -- cognitive-linguistic impairment  - cognitive-linguistic impairment  -      Prior Level of Function-Swallowing -- mechanical ground textures;thin liquids;alternative feeding method;other (see comments)  has PEG  - mechanical ground textures;thin liquids;alternative feeding method  has PEG  -      Plans/Goals Discussed with -- patient  - patient  -      Barriers to Rehab -- medically complex;visual deficit;previous functional deficit;cognitive status  - medically complex;visual deficit;previous functional deficit;cognitive status  -      Patient's Goals for Discharge -- no concerns voiced  - no concerns voiced  -         Pain    Pretreatment Pain Rating -- 0/10 - no pain  - --      Posttreatment Pain Rating -- 0/10 - no pain  - --      Additional Documentation Pain Scale: FACES Pre/Post-Treatment (Group)  -RS -- Pain Scale: FACES Pre/Post-Treatment (Group)  -         Pain Scale: FACES Pre/Post-Treatment    Pain: FACES Scale, Pretreatment 0-->no hurt  -RS -- 0-->no hurt  -CJ      Posttreatment Pain Rating 0-->no hurt  -RS -- 0-->no hurt  -CJ         Oral Motor Structure and Function    Dentition Assessment -- missing teeth  - missing teeth  -      Secretion Management -- anterior loss  - anterior loss  -      Mucosal Quality -- sticky  -CH sticky  -CJ         Oral Musculature and Cranial Nerve Assessment    Oral Motor General Assessment -- generalized oral motor weakness  - generalized oral motor weakness  -         General Eating/Swallowing Observations    Respiratory Support Currently in Use -- nasal cannula  - nasal cannula  -      O2 Liters -- 2L  -CH 2L  -CJ       Eating/Swallowing Skills -- fed by SLP;unable to perform self-feeding  - fed by SLP;unable to perform self-feeding  -      Positioning During Eating -- upright 90 degree;upright in bed  - upright in bed  -      Utensils Used -- spoon;cup;straw  - spoon;cup;straw  -      Consistencies Trialed -- pureed;thin liquids;ice chips;nectar/syrup-thick liquids  - pureed;thin liquids;ice chips;nectar/syrup-thick liquids  -         Clinical Swallow Eval    Oral Prep Phase -- impaired  -CH impaired  -CJ      Oral Transit -- impaired  -CH impaired  -CJ      Oral Residue -- WFL  -CH WFL  -CJ      Pharyngeal Phase -- suspected pharyngeal impairment  - suspected pharyngeal impairment  -      Esophageal Phase -- unremarkable  - unremarkable  -         Oral Prep Concerns    Oral Prep Concerns -- increased prep time;incomplete or weak lip closure around spoon  - increased prep time  -      Increased Prep Time -- pudding  - pudding  -         Oral Transit Concerns    Oral Transit Concerns -- delayed initiation of bolus transit  - delayed initiation of bolus transit  -      Delayed Intiation of Bolus Transit -- all consistencies  - all consistencies  -         Pharyngeal Phase Concerns    Pharyngeal Phase Concerns -- throat clear;wet vocal quality  - throat clear;wet vocal quality  -      Wet Vocal Quality -- thin  - thin  -      Throat Clear -- thin  - thin  -CJ      Pharyngeal Phase Concerns, Comment -- -- No s/s w/ trials of nectar thick liquids, Pt declined to accept trials of solids. okay for puree/nectar when fully awake/alert today and plan to complete MBS tomorrow. If any concerns please make NPO and utilize PEG tube  -         MBS/VFSS    Utensils Used spoon  -RS -- --      Consistencies Trialed pureed;thin liquids;nectar/syrup-thick liquids  -RS -- --         MBS/VFSS Interpretation    Oral Prep Phase impaired oral phase of swallowing  -RS -- --      Oral Transit Phase  impaired  -RS -- --      Oral Residue impaired  -RS -- --         Oral Preparatory Phase    Oral Preparatory Phase reduced lip opening;anterior loss;oral holding;prolonged manipulation;inadequate manipulation;bolus removed from mouth manually  -RS -- --      Reduced Lip Opening all consistencies tested  -RS -- --      Anterior Loss thin liquids;secondary to reduced labial seal;secondary to impaired cognitive status  -RS -- --      Oral Holding all consistencies tested;secondary to impaired cognitive status  -RS -- --      Prolonged Manipulation pudding/puree;secondary to impaired cognitive status  -RS -- --      Inadequate Manipulation pudding/puree;secondary to impaired cognitive status  -RS -- --      Bolus Removed from Mouth Manually pudding/puree;secondary to impaired cognitive status  -RS -- --         Oral Transit Phase    Impaired Oral Transit Phase premature spillage of liquids into pharynx  -RS -- --      Premature Spillage of Liquids into Pharynx thin liquids;nectar-thick liquids;secondary to reduced lingual control;secondary to impaired cognitive status  -RS -- --         Oral Residue    Impaired Oral Residue diffuse residue throughout oral cavity  -RS -- --      Diffuse Residue throughout Oral Cavity pudding/puree;secondary to reduced lingual strength;secondary to reduced lingual range of motion  -RS -- --      Response to Oral Residue unable to clear residue;other (see comments)  oral cavity cleared by SLP  -RS -- --         Initiation of Pharyngeal Swallow    Initiation of Pharyngeal Swallow bolus in pyriform sinuses  -RS -- --      Pharyngeal Phase impaired pharyngeal phase of swallowing;unable to assess;unable/unwilling to cooperate  -RS -- --      Penetration Before the Swallow thin liquids;secondary to reduced back of tongue control;secondary to delayed swallow initiation or mistiming  -RS -- --      Aspiration During the Swallow thin liquids;secondary to delayed swallow initiation or  mistiming;secondary to reduced laryngeal elevation;other (see comments)  aspiration during the swallow of penetrated material 2/2  -RS -- --      Response to Penetration No  -RS -- --      No spontaneous response to penetration and could not produce cough response despite cue  -RS -- --      Response to Aspiration No  -RS -- --      No spontaneous response to aspiration and could not produce cough response despite cue  -RS -- --      Rosenbek's Scale thin:;8--->level 8  -RS -- --      Pharyngeal Residue nectar-thick liquids;diffuse within pharynx;secondary to reduced base of tongue retraction;secondary to reduced posterior pharyngeal wall stripping;secondary to reduced laryngeal elevation;secondary to reduced hyolaryngeal excursion  -RS -- --      Response to Residue unable to clear residue  -RS -- --      Attempted Compensatory Maneuvers other (see comments)  pt u/a to maintain appropriate alertness for continuation of study and therefore no maneuvers were attempted or considered  -RS -- --      Pharyngeal Phase, Comment Level of lethargy precludes safe PO intake. Secure chat b/w RN and MD, u/a to rec safe PO at this time. Resume nutritional support via PEG. SLP will cont to follow and consider PO when pt consistently awake and alert  -RS -- --         Swallowing Quality of Life Assessment    Education and counseling provided -- Risks of aspiration;Oral care recommendations and rationale  - --         SLP Evaluation Clinical Impression    SLP Swallowing Diagnosis mod-severe;oral dysphagia;suspected pharyngeal dysphagia  -RS oral dysphagia;suspected pharyngeal dysphagia  - oral dysphagia;suspected pharyngeal dysphagia  -      Functional Impact risk of aspiration/pneumonia;risk of malnutrition  -RS risk of aspiration/pneumonia  -CH risk of aspiration/pneumonia  -CJ      Rehab Potential/Prognosis, Swallowing re-evaluate goals as necessary  -RS adequate, monitor progress closely  -CH adequate, monitor progress  closely  -      Swallow Criteria for Skilled Therapeutic Interventions Met demonstrates skilled criteria  -RS demonstrates skilled criteria  - demonstrates skilled criteria  -         Recommendations    Therapy Frequency (Swallow) -- evaluation only  - --      SLP Diet Recommendation NPO;long term alternate methods of nutrition/hydration  -RS puree;nectar thick liquids;long term alternate methods of nutrition/hydration  - puree;nectar thick liquids;long term alternate methods of nutrition/hydration  -      Recommended Diagnostics reassess via clinical swallow evaluation;other (see comments)  when consistently awake and alert  -RS VFSS ([facility]);other (see comments)  Patient stated that he is agreeable to go to [facility] tomorrow. Declined this date  - VFSS ([facility])  -      Recommended Precautions and Strategies -- upright posture during/after eating;small bites of food and sips of liquid;general aspiration precautions;reflux precautions;assist with feeding  - upright posture during/after eating;small bites of food and sips of liquid;general aspiration precautions;reflux precautions;assist with feeding  -      Oral Care Recommendations Oral Care BID/PRN;Suction toothbrush  -RS Oral Care BID/PRN;Suction toothbrush  - Oral Care BID/PRN;Suction toothbrush  -      SLP Rec. for Method of Medication Administration meds via alternate route  -RS meds whole;meds crushed;with puree;meds via alternate route  - meds whole;meds crushed;with puree;meds via alternate route  -      Monitor for Signs of Aspiration notify SLP if any concerns  -RS yes;notify SLP if any concerns  - yes;notify SLP if any concerns  -      Anticipated Discharge Disposition (SLP) skilled nursing facility  -RS skilled nursing facility  - skilled nursing [facility]  -                User Key  (r) = Recorded By, (t) = Taken By, (c) = Cosigned By      Initials Name Effective Dates    Eliz Mooney MS CCC-SLP 06/03/24 - 10/21/24      Maya Riddle, MS CCC-SLP 06/16/21 -     RS Sawyer Valle, MS CCC-SLP 09/14/23 -                     EDUCATION  The patient has been educated in the following areas:   Dysphagia (Swallowing Impairment) NPO rationale.        SLP GOALS       Row Name 10/23/24 1320             (LTG) Patient will demonstrate functional swallow for    Diet Texture (Demonstrate functional swallow) soft to chew (ground) textures  -RS      Liquid viscosity (Demonstrate functional swallow) thin liquids  -RS      [place] (Demonstrate functional swallow) with maximum cues (25-49% accuracy)  -RS      Time Frame (Demonstrate functional swallow) 1 week  -RS         (STG) Labial Strengthening Goal 1 (SLP)    Activity (Labial Strengthening Goal 1, SLP) increase labial tone  -RS      Increase Labial Tone labial resistance exercises  -RS      [place]/Accuracy (Labial Strengthening Goal 1, SLP) with maximum cues (25-49% accuracy)  -RS      Time Frame (Labial Strengthening Goal 1, SLP) 1 week  -RS         (STG) Lingual Strengthening Goal 1 (SLP)    Activity (Lingual Strengthening Goal 1, SLP) increase tongue back strength  -RS      Increase Tongue Back Strength lingual resistance exercises  -RS      [place]/Accuracy (Lingual Strengthening Goal 1, SLP) with maximum cues (25-49% accuracy)  -RS      Time Frame (Lingual Strengthening Goal 1, SLP) 1 week  -RS         (STG) Pharyngeal Strengthening Exercise Goal 1 (SLP)    Activity (Pharyngeal Strengthening Goal 1, SLP) increase timing;increase superior movement of the hyolaryngeal complex;increase anterior movement of the hyolaryngeal complex;increase squeeze/positive pressure generation  -RS      Increase Timing prepping - 3 second prep or suck swallow or 3-step swallow  -RS      Increase Superior Movement of the Hyolaryngeal Complex falsetto  -RS      Increase Anterior Movement of the Hyolaryngeal Complex chin tuck against resistance (CTAR)  -RS      Increase Squeeze/Positive Pressure  Generation hard effortful swallow  -RS      [place]/Accuracy (Pharyngeal Strengthening Goal 1, SLP) with maximum cues (25-49% accuracy)  -RS      Time Frame (Pharyngeal Strengthening Goal 1, SLP) 1 week  -RS                User Key  (r) = Recorded By, (t) = Taken By, (c) = Cosigned By      Initials Name Provider Type    Sawyer High MS CCC-SLP Speech and Language Pathologist                         Time Calculation:    Time Calculation- SLP       Row Name 10/23/24 1412             Time Calculation- SLP    SLP Start Time 1320  -RS      SLP Received On 10/23/24  -RS         Untimed Charges    02701-FN Motion Fluoro Eval Swallow Minutes 62  -RS         Total Minutes    Untimed Charges Total Minutes 62  -RS       Total Minutes 62  -RS                User Key  (r) = Recorded By, (t) = Taken By, (c) = Cosigned By      Initials Name Provider Type    Sawyer High MS CCC-SLP Speech and Language Pathologist                    Therapy Charges for Today       Code Description Service Date Service Provider Modifiers Qty    [id number]  ST MOTION FLUORO EVAL SWALLOW 4 10/23/2024 Sawyer Valle MS CCC-SLP GN 1                 MS JIM Lopez  10/23/2024

## 2024-10-23 NOTE — PROGRESS NOTES
[facility] Medicine Services  PROGRESS NOTE    Patient Name: Omkar Nava  : 1955  MRN: [id number]    Date of Admission: 10/20/2024  Primary Care Physician: Alberto Dye MD    Subjective   Subjective     CC:  Vomiting    HPI:  Patient seen resting comfortably in bedside chair.  Patient was oriented to name and date of birth.  Was able to talk to family member on the phone today who states his baseline orientation is name and date of birth.  However, family member stated he has had decreased p.o. intake over the past couple days to week.      Objective   Objective     Vital Signs:   Temp:  [97.7 °F (36.5 °C)-98.8 °F (37.1 °C)] 97.7 °F (36.5 °C)  Heart Rate:  [60-72] 66  Resp:  [16-18] 16  BP: (106-142)/(48-58) 106/53  Flow (L/min) (Oxygen Therapy):  [2] 2     Physical Exam  Constitutional:       General: He is not in acute distress.  Eyes:      Extraocular Movements: Extraocular movements intact.   Cardiovascular:      Rate and Rhythm: Normal rate.      Pulses: Normal pulses.   Pulmonary:      Effort: Pulmonary effort is normal. No respiratory distress.   Abdominal:      General: There is no distension.      Palpations: Abdomen is soft.      Tenderness: There is no abdominal tenderness. There is no guarding or rebound.   Musculoskeletal:      Right lower leg: No edema.      Left lower leg: No edema.   Skin:     General: Skin is warm.   Neurological:      Mental Status: Mental status is at baseline. He is disoriented.          Results Reviewed:  LAB RESULTS:      Lab 10/23/24  0943 10/22/24  1317 10/20/24  2049   WBC 14.01* 5.80 8.28   HEMOGLOBIN 8.5* 8.4* 8.2*   HEMATOCRIT 25.7* 26.3* 25.3*   PLATELETS 221 206 232   NEUTROS ABS 11.60* 3.80 6.79   IMMATURE GRANS (ABS) 0.07* 0.01 0.05   LYMPHS ABS 1.19 1.30 0.99   MONOS ABS 1.11* 0.57 0.43   EOS ABS 0.02 0.11 0.01   MCV 93.1 92.6 94.8   PROCALCITONIN  --  0.05 0.13   LACTATE  --   --  0.7         Lab 10/23/24  0943  10/21/24  1136 10/20/24  2049   SODIUM 142 143 146*   POTASSIUM 4.6 5.0 5.5*   CHLORIDE 107 110* 110*   CO2 25.0 23.0 25.0   ANION GAP 10.0 10.0 11.0   BUN 33* 30* 40*   CREATININE 1.25 1.12 1.32*   EGFR 63.1 72.0 59.1*   GLUCOSE 153* 138* 130*   CALCIUM 8.8 9.5 9.6         Lab 10/23/24  0943 10/20/24  2049   TOTAL PROTEIN 5.9* 7.6   ALBUMIN 3.2* 4.0   GLOBULIN 2.7 3.6   ALT (SGPT) 22 31   AST (SGOT) 13 22   BILIRUBIN <0.2 <0.2   ALK PHOS 92 97   LIPASE  --  18                     Lab 10/22/24  1325   FIO2 28   CARBOXYHEMOGLOBIN (VENOUS) 1.0     Brief Urine Lab Results  (Last result in the past 365 days)        Color   Clarity   Blood   Leuk Est   Nitrite   Protein   CREAT   Urine HCG        10/20/24 2123 Yellow   Clear   Negative   Negative   Negative   100 mg/dL (2+)                   Microbiology Results Abnormal       Procedure Component Value - Date/Time    Blood Culture - Blood, Hand, Right [[id number]]  (Normal) Collected: 10/21/24 0107    Lab Status: Preliminary result Specimen: Blood from Hand, Right Updated: 10/23/24 0215     Blood Culture No growth at 2 days    Blood Culture - Blood, Arm, Right [[id number]]  (Normal) Collected: 10/21/24 0107    Lab Status: Preliminary result Specimen: Blood from Arm, Right Updated: 10/23/24 0215     Blood Culture No growth at 2 days            FL Video Swallow With Speech Single Contrast    Result Date: 10/23/2024  FL VIDEO SWALLOW W SPEECH SINGLE-CONTRAST Date of Exam: 10/23/2024 1:31 PM EDT Indication: dysphagia.   Comparison: None available. Technique:   The speech pathologist administered food and/or liquid mixed with barium to the patient with cine/video imaging.  Imaging assistance was provided to the speech pathologist and an image was saved. Fluoroscopic Time: 48 seconds Number of Images: 6 associated fluoroscopic loops were saved Findings: Aspiration was seen during fluoroscopic guided modified barium swallowing series. Please see speech therapy report for full  details and recommendations.     Impression: Impression: Fluoroscopy provided for a modified barium swallow. Aspiration was seen during swallowing evaluation. Please see speech therapy report for full details and recommendations. Report dictated by: Maggi Silverman PA-c  I have personally reviewed this case and agree with the findings above: Electronically Signed: Wisam Oquendo MD  10/23/2024 5:02 PM EDT  Workstation ID: [id number]     Results for orders placed during the hospital encounter of 01/15/24    Adult Transthoracic Echo Complete With Contrast if Necessary Per Protocol    Interpretation Summary    Left ventricular ejection fraction appears to be 56 - 60%.    Left ventricular wall thickness is consistent with hypertrophy.    Estimated right ventricular systolic pressure from tricuspid regurgitation is mildly elevated (35-45 mmHg).    There is a small (1-2cm) pericardial effusion.    No evidence for pericardial tamponade      Current medications:  Scheduled Meds:albuterol, 2.5 mg, Nebulization, Q6H - RT  amLODIPine, 10 mg, Per PEG Tube, Q24H  amoxicillin, 875 mg, Per PEG Tube, Q12H  ARIPiprazole, 5 mg, Per G Tube, Daily  brimonidine, 1 drop, Both Eyes, TID  carvedilol, 12.5 mg, Per PEG Tube, Q12H  cloNIDine, 0.2 mg, Per PEG Tube, Q8H  enoxaparin, 40 mg, Subcutaneous, Daily  FLUoxetine, 20 mg, Per PEG Tube, Daily  hydrALAZINE, 100 mg, Per PEG Tube, Q8H  insulin lispro, 2-7 Units, Subcutaneous, 4x Daily AC & at Bedtime  lansoprazole, 30 mg, Per PEG Tube, Q AM  palliative care oral rinse, 5 mL, Mouth/Throat, 4x Daily  QUEtiapine, 25 mg, Per PEG Tube, Nightly  sodium chloride, 10 mL, Intravenous, Q12H  terazosin, 5 mg, Per G Tube, Q12H  timolol, 1 drop, Both Eyes, BID  Valproic Acid, 150 mg, Nasogastric, Q8H      Continuous Infusions:   PRN Meds:.  acetaminophen **OR** acetaminophen **OR** acetaminophen    senna-docusate sodium **AND** polyethylene glycol **AND** [DISCONTINUED] bisacodyl **AND** bisacodyl    dextrose     glucagon (human recombinant)    hydrOXYzine    Magnesium Standard Dose Replacement - Follow Nurse / BPA Driven Protocol    nitroglycerin    [DISCONTINUED] ondansetron ODT **OR** ondansetron    Phosphorus Replacement - Follow Nurse / BPA Driven Protocol    Potassium Replacement - Follow Nurse / BPA Driven Protocol    sodium chloride    sodium chloride    Assessment & Plan   Assessment & Plan     Active Hospital Problems    Diagnosis  POA    **UTI (urinary tract infection) [N39.0]  Yes    Acute encephalopathy [G93.40]  Yes    Vomiting [R11.10]  Yes    Severe vascular dementia without behavioral disturbance, psychotic disturbance, mood disturbance, or anxiety [F01.C0]  Yes    Protein calorie malnutrition [E46]  Yes    S/P transmetatarsal amputation of foot, left [Z89.432]  Not Applicable    Anemia of chronic disease [D63.8]  Yes    Neurocognitive disorder [R41.9]  Yes    Type 2 diabetes mellitus [E11.9]  Yes    Essential hypertension [I10]  Yes    Hypertensive heart and chronic kidney disease 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 failure, with stage 1 through stage 4 chronic kidney disease, or unspecified chronic kidney disease [I13.10]  Yes    Long term (current) use of insulin [Z79.4]  Not Applicable      Resolved Hospital Problems   No resolved problems to display.        Brief Hospital Course to date:  Omkar Nava is a 69 y.o. male with a PMHx of hypertension, diabetes, chronic kidney disease, vascular dementia, dysphagia, and chronic iron def anemia admitted with increased vomiting, with concern for possible underlying infection.     Acute encephalopathy likely secondary to infection  UTI/viral pneumonia  --UA with 4+ bacteria and urine cultures growing Enterococcus faecalis.    -- In setting of worsening leukocytosis, will treat with amoxicillin for 5 days.  -- Respiratory panel positive for rhinovirus  -- follow up blood cultures      Hyperkalemia.  resolved  --s/p lokelma     JAYY on CKD-stage 2. Resolved   -Baseline creatinine near  1.1-1.2  -Creatinine today 1.25  -Status post IVF     Chronic aspiration  - PEG tube in place  --Nutrition to recommend TF  --Pt with pureed foods, nectar thick liquids per daughter.--although this is different from SLP notes from last admission  --SLP consulted     Chronic anemia  - At baseline, cont to monitor.     HFpEF  HTN  -Echo with EF of 56 to 60%  - Continue carvedilol, clonidine, terazosin, hydralazine  -- Hold diuretics     T2DM  - A1c 5.6 last hospitalization  --Due to poor intake, hold levemir for now.  Cover with SSI.     Dementia with agitation  Questionable seizure disorder  - Seroquel, Depakote, Abilify, prozac  -- Valproic acid subtherapeutic     Expected Discharge Location and Transportation: D  Expected Discharge   Expected Discharge Date: 10/23/2024; Expected Discharge Time:      VTE Prophylaxis:  Pharmacologic VTE prophylaxis orders are present.         AM-PAC 6 Clicks Score (PT): 6 (10/23/24 1033)    CODE STATUS:   Code Status and Medical Interventions: CPR (Attempt to Resuscitate); Full Support   Ordered at: 10/21/24 0146     Level Of Support Discussed With:    Health Care Surrogate     Code Status (Patient has no pulse and is not breathing):    CPR (Attempt to Resuscitate)     Medical Interventions (Patient has pulse or is breathing):    Full Support       Ryne uBrgos DO  10/23/24

## 2024-10-23 NOTE — PLAN OF CARE
Goal Outcome Evaluation:  Plan of Care Reviewed With: patient           Outcome Evaluation: PT PRESENTS WITH EVOLVING SYMPTOMS TO INCLUDE IMPAIRED BALANCE, GENERALIZED WEAKNESS, AND DECLINE IN FUNCTIONAL MOBILITY. PER CONVERSATION WITH DAUGHTER, AT SNF, PT IS ABLE TO TRANSFER TO W/C WITH A TRANSFER POLE AND STAFF ASSIST.  PT IS BLIND AND HAS A PEG AT BASELINE. CURRENTLY PT IS DEPENDENT WITH ROLLING IN BED AND REQUIRES A MECHANICAL LIFT FOR BED TO RECLINER DUE TO PROFOUND WEAKNESS. 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 RECOMMEND RETURN TO SNF AT D/C.    Anticipated Discharge Disposition (PT): skilled nursing facility

## 2024-10-23 NOTE — CASE MANAGEMENT/SOCIAL WORK
Continued Stay Note  [facility]     Patient Name: Omkar Nava  MRN: [id number]  Today's Date: 10/23/2024    Admit Date: 10/20/2024    Plan: [facility]   Discharge Plan       Row Name 10/23/24 1507       Plan    Plan [facility]    Patient/Family in Agreement with Plan yes    Plan Comments Discussed Mr. Nava in MDR. Awaiting medical readiness for him to return to his LTC bed at [facility]. CM will continue to follow up.    Final Discharge Disposition Code 04 - intermediate care facility                   Discharge Codes    No documentation.                 Expected Discharge Date and Time       Expected Discharge Date Expected Discharge Time    Oct 23, 2024               Jaki Felton RN

## 2024-10-23 NOTE — PLAN OF CARE
Goal Outcome Evaluation:           Progress: declining       Anticipated Discharge Disposition (SLP): skilled nursing facility          SLP Swallowing Diagnosis: mod-severe, oral dysphagia, suspected pharyngeal dysphagia (10/23/24 4546)

## 2024-10-23 NOTE — THERAPY EVALUATION
Patient Name: Omkar Nava  : 1955    MRN: [id number]                              Today's Date: 10/23/2024       Admit Date: 10/20/2024    Visit Dx:     ICD-10-CM ICD-9-CM   1. Acute cystitis without hematuria  N30.00 595.0   2. Pneumonia due to infectious organism, unspecified laterality, unspecified part of lung  J18.9 486   3. 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, without long-term current use of insulin    Acute osteomyelitis of left foot    Type 2 diabetes mellitus    Essential hypertension    Psychotic disorder    Neurocognitive disorder    S/P transmetatarsal amputation of foot, left    Abscess of left foot    Anemia of chronic disease    Aspiration pneumonia    Cervical spine pain    Chronic kidney disease    Closed head injury without loss of consciousness    Dementia    Dental cavities    Diarrhea of presumed infectious origin    Displaced fracture of proximal phalanx of left great toe, initial encounter for open fracture    Dysphagia    Erectile dysfunction    Generalized muscle weakness    Hallucinations    Hypertensive heart and chronic kidney disease 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 failure, with stage 1 through stage 4 chronic kidney disease, or unspecified chronic kidney disease    Insomnia due to medical condition    Iron deficiency anemia    Laceration without foreign body, left foot, subsequent encounter    Long term (current) use of insulin    Personal history of Methicillin resistant Staphylococcus aureus infection    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    Protein calorie malnutrition    Simple chronic bronchitis    Tobacco use    Type 2 diabetes mellitus with diabetic neuropathy, unspecified    Type 2 diabetes mellitus with diabetic chronic kidney disease    Acute type 1 respiratory failure    Severe vascular dementia without  behavioral disturbance, psychotic disturbance, mood disturbance, or anxiety    UTI (urinary tract infection)    Vomiting    Acute encephalopathy     Past Medical History:   Diagnosis Date    Anemia     Dementia     Diabetes mellitus     Dysphagia     GERD (gastroesophageal reflux disease)     History of alcohol abuse     History of cocaine use     History of marijuana use     Hypertension     Osteomyelitis     Poor historian     records obtained from nursing home records & his family    Visual impairment      Past Surgical History:   Procedure Laterality Date    AMPUTATION FOOT Left 10/18/2022    Procedure: PARTIAL FIRST RAY AMPUTATION LEFT;  Surgeon: Yeison Petyt MD;  Location:  [facility];  Service: Orthopedics;  Laterality: Left;    AMPUTATION FOOT Left 12/5/2022    Procedure: Transmetatarsal of Left Foot;  Surgeon: Yeison Petty MD;  Location:  [facility];  Service: Orthopedics;  Laterality: Left;    ENDOSCOPY N/A 3/14/2024    Procedure: ESOPHAGOGASTRODUODENOSCOPY WITH JEJUNAL TUBE INSERTION AT BEDSIDE;  Surgeon: Brunner, Mark I, MD;  Location:  [facility];  Service: Gastroenterology;  Laterality: N/A;    ENDOSCOPY W/ PEG TUBE PLACEMENT N/A 4/1/2024    Procedure: ESOPHAGOGASTRODUODENOSCOPY WITH PERCUTANEOUS ENDOSCOPIC GASTROSTOMY TUBE INSERTION;  Surgeon: Brunner, Mark I, MD;  Location:  [facility];  Service: Gastroenterology;  Laterality: N/A;  EGD with PEG placement.  Secured at 3.5 cm    EYE SURGERY        General Information       Row Name 10/23/24 1002          Physical Therapy Time and Intention    Document Type evaluation  -CD     Mode of Treatment physical therapy  -CD       Row Name 10/23/24 1002          General Information    Patient Profile Reviewed yes  -CD     Prior Level of Function mod assist:;transfer;bed mobility;dependent:;feeding;ADL's;max assist:;w/c or scooter  PER DTR, AT SNF, PT CAN SIT EOB AND TRANSFER WITH POLE AND ASSIST OF STAFF. PT IS BLIND AND GETS HELP TO EAT AND FOR ALL  ADL'S  -CD     Existing Precautions/Restrictions fall;oxygen therapy device and L/min  DROPLET PRECAUTIONS (RHINOVIRUS) H/O L TRANSMETATARSAL AMPUTATION, HAS PEG, BLIND (SEES SHADOWS)  -CD     Barriers to Rehab visual deficit;cognitive status;medically complex;previous functional deficit  -CD       Row Name 10/23/24 1002          Living Environment    People in Home facility resident  [facility].  -CD       Row Name 10/23/24 1002          Cognition    Orientation Status (Cognition) oriented to;person;disoriented to;place;situation;time  -CD       Row Name 10/23/24 1002          Safety Issues/Impairments Affecting Functional Mobility    Safety Issues Affecting Function (Mobility) insight into deficits/self-awareness;ability to follow commands;awareness of need for assistance;safety precaution awareness;safety precautions follow-through/compliance  -CD     Impairments Affecting Function (Mobility) balance;cognition;endurance/activity tolerance;strength;visual/perceptual  -CD     Cognitive Impairments, Mobility Safety/Performance insight into deficits/self-awareness;safety precaution awareness;safety precaution follow-through;sequencing abilities;awareness, need for assistance;attention  -CD     Comment, Safety Issues/Impairments (Mobility) PT IS VERY WEAK AND FATIGUES QUICKLY. BLIND AND WITH DYSPHAGIA AT BASELINE. PLANS FOR MBS LATER TODAY PER NSG.  -CD               User Key  (r) = Recorded By, (t) = Taken By, (c) = Cosigned By      Initials Name Provider Type    CD Rae Horowitz, PT Physical Therapist                   Mobility       Row Name 10/23/24 1013          Bed Mobility    Bed Mobility rolling left;rolling right  -CD     Rolling Left [place] (Bed Mobility) dependent (less than 25% patient effort);2 person assist  -CD     Rolling Right Alex (Bed Mobility) dependent (less than 25% patient effort);2 person assist  -CD     Comment, (Bed Mobility) ROLLED L&R TO CLEAN LIQUID BM AND FOR  PLACEMENT OF MECHANICAL LIFT SLING. PT ABLE TO GRASP BEDRAIL WEAKLY WITH BOTH HANDS WITH MAX CUES BUT COULD NOT SUSTAIN. NSG NOTIFIED OF BM.  -CD       Row Name 10/23/24 1013          Transfers    Comment, (Transfers) MECHANICAL LIFT BED TO CHAIR. UNABLE TO ATTEMPT STS DUE TO POOR SITTING BALANCE, PROFOUND WEAKNESS.  -CD       Row Name 10/23/24 1013          Bed-Chair Transfer    Bed-Chair [place] (Transfers) dependent (less than 25% patient effort);2 person assist  -CD     Assistive Device (Bed-Chair Transfers) lift device  -CD       Row Name 10/23/24 1013          Sit-Stand Transfer    Sit-Stand [place] (Transfers) unable to assess  -CD       Row Name 10/23/24 1013          Gait/Stairs (Locomotion)    [place] Level (Gait) not tested;other (see comments)  -CD     Comment, (Gait/Stairs) SPOKE WITH DAUGHTER ON PHONE. PT IS NON-AMBULATORY AT BASELINE. PIVOTS TO W/C WITH POLE AND STAFF TO ASSIST.  -CD               User Key  (r) = Recorded By, (t) = Taken By, (c) = Cosigned By      Initials Name Provider Type    CD Rae Horowitz, PT Physical Therapist                   Obj/Interventions       Row Name 10/23/24 1017          Range of Motion Comprehensive    General Range of Motion bilateral lower extremity ROM WFL  -CD     Comment, General Range of Motion PROM B LE. PT UNABLE TO FOLLOW COMMANDS FOR ROM OF LE'S. DID NOT ACTIVELY ASSIST. ROM WFL'S FOR SITTING 90/90 IN RECLINER.  -CD       Row Name 10/23/24 1017          Strength Comprehensive (MMT)    General Manual Muscle Testing (MMT) Assessment lower extremity strength deficits identified  -CD     Comment, General Manual Muscle Testing (MMT) Assessment GROSSLY 0-1/5. UNABLE TO FOLLOW COMMANDS FOR ROM ASSESSMENT OR MMT. NO SPONTANEOUS MOVEMENT OF LE'S NOTED IN BED DURING ROLLING FOR  HYGIENE.  -CD       Row Name 10/23/24 1017          Motor Skills    Motor Skills functional endurance;coordination  -CD     Therapeutic Exercise --  COMPLETED PROM TO B LE'S  X 10 REPS EACH: HEEL SLIDES, HIP ABD/ADD, DF/PF.  -CD       Row Name 10/23/24 1017          Balance    Balance Assessment sitting static balance  -CD     Static Sitting Balance dependent;2-person assist  -CD     Position, Sitting Balance supported  WITH SUPPORT OF B UE ON ARMRESTS OF RECLINER.  -CD     Comment, Balance WORKED ON SITTING EDGE OF RECLINER AT MIDLINE. PT UNABLE TO MAINTAIN STATIC SITTING BALANCE DESPITE POSITIONING OF HANDS ON ARMRESTS AND FEET ON FLOOR. LOSES BALANCE POSTERIORLY.  -CD       Row Name 10/23/24 1017          Sensory Assessment (Somatosensory)    Sensory Assessment (Somatosensory) unable/difficult to assess  -CD               User Key  (r) = Recorded By, (t) = Taken By, (c) = Cosigned By      Initials Name Provider Type    CD Rae Horowitz, PT Physical Therapist                   Goals/Plan       Row Name 10/23/24 1032          Bed Mobility Goal 1 (PT)    Activity/Assistive Device (Bed Mobility Goal 1, PT) sit to supine/supine to sit  -CD     [place] Level/Cues Needed (Bed Mobility Goal 1, PT) moderate assist (50-74% patient effort)  -CD     Time Frame (Bed Mobility Goal 1, PT) short term goal (STG);1 week  -CD       Row Name 10/23/24 1032          Transfer Goal 1 (PT)    Activity/Assistive Device (Transfer Goal 1, PT) sit-to-stand/stand-to-sit;bed-to-chair/chair-to-bed  -CD     [place] Level/Cues Needed (Transfer Goal 1, PT) moderate assist (50-74% patient effort)  -CD     Time Frame (Transfer Goal 1, PT) long term goal (LTG);10 days  -CD       Row Name 10/23/24 1032          Balance Goal 1 (PT)    Activity/Assistive Device (Balance Goal) sitting static balance  -CD     [place] Level/Cues Needed (Balance Goal 1, PT) contact guard required  -CD     Time Frame (Balance Goal 1, PT) short-term goal (STG);1 week  -CD       Row Name 10/23/24 1032          Therapy Assessment/Plan (PT)    Planned Therapy Interventions (PT) balance training;bed mobility training;home exercise  program;strengthening;transfer training;patient/family education;neuromuscular re-education  -CD               User Key  (r) = Recorded By, (t) = Taken By, (c) = Cosigned By      Initials Name Provider Type    CD Rae Horowitz, PT Physical Therapist                   Clinical Impression       Row Name 10/23/24 1023          Pain Scale: FACES Pre/Post-Treatment    Pain: FACES Scale, Pretreatment 0-->no hurt  -CD     Posttreatment Pain Rating 0-->no hurt  -CD       Row Name 10/23/24 1023          Plan of Care Review    Plan of Care Reviewed With patient  -CD     Outcome Evaluation PT PRESENTS WITH EVOLVING SYMPTOMS TO INCLUDE IMPAIRED BALANCE, GENERALIZED WEAKNESS, AND DECLINE IN FUNCTIONAL MOBILITY. PER CONVERSATION WITH DAUGHTER, AT SNF, PT IS ABLE TO TRANSFER TO W/C WITH A TRANSFER POLE AND STAFF ASSIST.  PT IS BLIND AND HAS A PEG AT BASELINE. CURRENTLY PT IS DEPENDENT WITH ROLLING IN BED AND REQUIRES A MECHANICAL LIFT FOR BED TO RECLINER DUE TO PROFOUND WEAKNESS. 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 RECOMMEND RETURN TO SNF AT D/C.  -CD       Row Name 10/23/24 1023          Therapy Assessment/Plan (PT)    Patient/Family Therapy Goals Statement (PT) DID NOT STATE. PER DTR, GOAL IS TO RETURN TO SNF AND RECEIVE REHAB.  -CD     Rehab Potential (PT) fair  -CD     Criteria for Skilled Interventions Met (PT) meets criteria;skilled treatment is necessary;yes  -CD     Therapy Frequency (PT) daily  MAY NEED TO CONSIDER DECREASE TO 3X WEEK IF PT UNABLE TO IMPROVE ACTIVE PARTICIPATION. .  -CD     Predicted Duration of Therapy Intervention (PT) 2 WEEKS  -CD       Row Name 10/23/24 1023          Vital Signs    Pre Systolic BP Rehab 142  -CD     Pre Treatment Diastolic BP 58  -CD     Post Systolic BP Rehab 103  -CD     Post Treatment Diastolic BP 46  -CD     Posttreatment Heart Rate (beats/min) 60  -CD     Pre SpO2 (%) 97  -CD     O2 Delivery Pre Treatment supplemental O2  -CD     O2 Delivery Intra Treatment  supplemental O2  -CD     Post SpO2 (%) 96  -CD     O2 Delivery Post Treatment supplemental O2  -CD     Pre Patient Position Supine  -CD     Intra Patient Position Sitting  -CD     Post Patient Position Sitting  -CD       Row Name 10/23/24 1023          Positioning and Restraints    Post Treatment Position chair  -CD     In Chair reclined;call light within reach;encouraged to call for assist;exit alarm on;notified nsg;legs elevated  -CD               User Key  (r) = Recorded By, (t) = Taken By, (c) = Cosigned By      Initials Name Provider Type    Rae Rowley, PT Physical Therapist                   Outcome Measures       Row Name 10/23/24 1033          How much help from another person do you currently need...    Turning from your back to your side while in flat bed without using bedrails? 1  -CD     Moving from lying on back to sitting on the side of a flat bed without bedrails? 1  -CD     Moving to and from a bed to a chair (including a wheelchair)? 1  -CD     Standing up from a chair using your arms (e.g., wheelchair, bedside chair)? 1  -CD     Climbing 3-5 steps with a railing? 1  -CD     To walk in hospital room? 1  -CD     AM-PAC 6 Clicks Score (PT) 6  -CD               User Key  (r) = Recorded By, (t) = Taken By, (c) = Cosigned By      Initials Name Provider Type    Rae Rowley, PT Physical Therapist                                 Physical Therapy Education       Title: PT OT SLP Therapies (Done)       Topic: Physical Therapy (Done)       Point: Mobility training (Done)       Learning Progress Summary            Patient Acceptance, E, VU,NR by CD at 10/23/2024 1033    Comment: BENEFITS OF OOB ACTIVITY, SAFETY WITH MOBILITY, PROGRESSION OF POC, D/C PLANNING,                      Point: Home exercise program (Done)       Learning Progress Summary            Patient Acceptance, E, VU,NR by CD at 10/23/2024 1033    Comment: BENEFITS OF OOB ACTIVITY, SAFETY WITH MOBILITY, PROGRESSION OF POC, D/C  PLANNING,                      Point: Body mechanics (Done)       Learning Progress Summary            Patient Acceptance, E, VU,NR by CD at 10/23/2024 1033    Comment: BENEFITS OF OOB ACTIVITY, SAFETY WITH MOBILITY, PROGRESSION OF POC, D/C PLANNING,                      Point: Precautions (Done)       Learning Progress Summary            Patient Acceptance, E, VU,NR by CD at 10/23/2024 1033    Comment: BENEFITS OF OOB ACTIVITY, SAFETY WITH MOBILITY, PROGRESSION OF POC, D/C PLANNING,                                      User Key       Initials Effective Dates Name Provider Type Discipline    CD 02/03/23 -  Rae Horowitz, PT Physical Therapist PT                  PT Recommendation and Plan  Planned Therapy Interventions (PT): balance training, bed mobility training, home exercise program, strengthening, transfer training, patient/family education, neuromuscular re-education  Outcome Evaluation: PT PRESENTS WITH EVOLVING SYMPTOMS TO INCLUDE IMPAIRED BALANCE, GENERALIZED WEAKNESS, AND DECLINE IN FUNCTIONAL MOBILITY. PER CONVERSATION WITH DAUGHTER, AT SNF, PT IS ABLE TO TRANSFER TO W/C WITH A TRANSFER POLE AND STAFF ASSIST.  PT IS BLIND AND HAS A PEG AT BASELINE. CURRENTLY PT IS DEPENDENT WITH ROLLING IN BED AND REQUIRES A MECHANICAL LIFT FOR BED TO RECLINER DUE TO PROFOUND WEAKNESS. 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 RECOMMEND RETURN TO SNF AT D/C.     Time Calculation:   PT Evaluation Complexity  History, PT Evaluation Complexity: 3 or more personal factors and/or comorbidities  Examination of Body Systems (PT Eval Complexity): total of 4 or more elements  Clinical Presentation (PT Evaluation Complexity): evolving  Clinical Decision Making (PT Evaluation Complexity): moderate complexity  Overall Complexity (PT Evaluation Complexity): moderate complexity     PT Charges       Row Name 10/23/24 1034             Time Calculation    Start Time 0855  -CD      PT Received On 10/23/24  -CD      PT Goal Re-Cert  Due Date 11/02/24  -CD         Time Calculation- PT    Total Timed Code Minutes- PT 39 minute(s)  -CD         Timed Charges    86696 - PT Therapeutic Exercise Minutes 9  -CD      81342 - PT Therapeutic Activity Minutes 30  -CD         Untimed Charges    PT Eval/Re-eval Minutes 60  -CD         Total Minutes    Timed Charges Total Minutes 39  -CD      Untimed Charges Total Minutes 60  -CD       Total Minutes 99  -CD                User Key  (r) = Recorded By, (t) = Taken By, (c) = Cosigned By      Initials Name Provider Type     Rae Horowitz, PT Physical Therapist                  Therapy Charges for Today       Code Description Service Date Service Provider Modifiers Qty    85734590895 HC PT THER PROC EA 15 MIN 10/23/2024 Rae Horowitz, PT GP 1    72252271905 HC PT THERAPEUTIC ACT EA 15 MIN 10/23/2024 Rae Horowitz, PT GP 2    07930197035 HC PT THER SUPP EA 15 MIN 10/23/2024 Rae Horowitz, PT GP 4    98081538192 HC PT EVAL MOD COMPLEXITY 4 10/23/2024 Rae Horowitz, PT GP 1            PT G-Codes  AM-PAC 6 Clicks Score (PT): 6  PT Discharge Summary  Anticipated Discharge Disposition (PT): skilled nursing facility    Rae Horowitz, KYRA  10/23/2024

## 2024-10-24 LAB
ANION GAP SERPL CALCULATED.3IONS-SCNC: 8 MMOL/L (ref 5–15)
BASOPHILS # BLD AUTO: 0.02 10*3/MM3 (ref 0–0.2)
BASOPHILS NFR BLD AUTO: 0.1 % (ref 0–1.5)
BUN SERPL-MCNC: 36 MG/DL (ref 8–23)
BUN/CREAT SERPL: 35.6 (ref 7–25)
CALCIUM SPEC-SCNC: 9.3 MG/DL (ref 8.6–10.5)
CHLORIDE SERPL-SCNC: 105 MMOL/L (ref 98–107)
CO2 SERPL-SCNC: 27 MMOL/L (ref 22–29)
CREAT SERPL-MCNC: 1.01 MG/DL (ref 0.76–1.27)
DEPRECATED RDW RBC AUTO: 49.6 FL (ref 37–54)
EGFRCR SERPLBLD CKD-EPI 2021: 81.5 ML/MIN/1.73
EOSINOPHIL # BLD AUTO: 0.16 10*3/MM3 (ref 0–0.4)
EOSINOPHIL NFR BLD AUTO: 1 % (ref 0.3–6.2)
ERYTHROCYTE [DISTWIDTH] IN BLOOD BY AUTOMATED COUNT: 14.6 % (ref 12.3–15.4)
GLUCOSE BLDC GLUCOMTR-MCNC: 154 MG/DL (ref 70–130)
GLUCOSE BLDC GLUCOMTR-MCNC: 157 MG/DL (ref 70–130)
GLUCOSE BLDC GLUCOMTR-MCNC: 162 MG/DL (ref 70–130)
GLUCOSE BLDC GLUCOMTR-MCNC: 181 MG/DL (ref 70–130)
GLUCOSE SERPL-MCNC: 181 MG/DL (ref 65–99)
HCT VFR BLD AUTO: 26 % (ref 37.5–51)
HGB BLD-MCNC: 8.6 G/DL (ref 13–17.7)
IMM GRANULOCYTES # BLD AUTO: 0.11 10*3/MM3 (ref 0–0.05)
IMM GRANULOCYTES NFR BLD AUTO: 0.7 % (ref 0–0.5)
LYMPHOCYTES # BLD AUTO: 1.5 10*3/MM3 (ref 0.7–3.1)
LYMPHOCYTES NFR BLD AUTO: 9.8 % (ref 19.6–45.3)
MCH RBC QN AUTO: 30.9 PG (ref 26.6–33)
MCHC RBC AUTO-ENTMCNC: 33.1 G/DL (ref 31.5–35.7)
MCV RBC AUTO: 93.5 FL (ref 79–97)
MONOCYTES # BLD AUTO: 1.13 10*3/MM3 (ref 0.1–0.9)
MONOCYTES NFR BLD AUTO: 7.4 % (ref 5–12)
NEUTROPHILS NFR BLD AUTO: 12.42 10*3/MM3 (ref 1.7–7)
NEUTROPHILS NFR BLD AUTO: 81 % (ref 42.7–76)
NRBC BLD AUTO-RTO: 0 /100 WBC (ref 0–0.2)
PLATELET # BLD AUTO: 206 10*3/MM3 (ref 140–450)
PMV BLD AUTO: 11.2 FL (ref 6–12)
POTASSIUM SERPL-SCNC: 5 MMOL/L (ref 3.5–5.2)
RBC # BLD AUTO: 2.78 10*6/MM3 (ref 4.14–5.8)
SODIUM SERPL-SCNC: 140 MMOL/L (ref 136–145)
WBC NRBC COR # BLD AUTO: 15.34 10*3/MM3 (ref 3.4–10.8)

## 2024-10-24 PROCEDURE — 92526 ORAL FUNCTION THERAPY: CPT

## 2024-10-24 PROCEDURE — 94799 UNLISTED PULMONARY SVC/PX: CPT

## 2024-10-24 PROCEDURE — 63710000001 INSULIN LISPRO (HUMAN) PER 5 UNITS: Performed by: HOSPITALIST

## 2024-10-24 PROCEDURE — 80048 BASIC METABOLIC PNL TOTAL CA: CPT | Performed by: STUDENT IN AN ORGANIZED HEALTH CARE EDUCATION/TRAINING PROGRAM

## 2024-10-24 PROCEDURE — 25010000002 ENOXAPARIN PER 10 MG: Performed by: PEDIATRICS

## 2024-10-24 PROCEDURE — 82948 REAGENT STRIP/BLOOD GLUCOSE: CPT

## 2024-10-24 PROCEDURE — 85025 COMPLETE CBC W/AUTO DIFF WBC: CPT | Performed by: STUDENT IN AN ORGANIZED HEALTH CARE EDUCATION/TRAINING PROGRAM

## 2024-10-24 PROCEDURE — 99232 SBSQ HOSP IP/OBS MODERATE 35: CPT | Performed by: STUDENT IN AN ORGANIZED HEALTH CARE EDUCATION/TRAINING PROGRAM

## 2024-10-24 RX ADMIN — VALPROIC ACID 150 MG: 250 SOLUTION ORAL at 21:05

## 2024-10-24 RX ADMIN — INSULIN LISPRO 2 UNITS: 100 INJECTION, SOLUTION INTRAVENOUS; SUBCUTANEOUS at 08:59

## 2024-10-24 RX ADMIN — AMOXICILLIN 875 MG: 875 TABLET, FILM COATED ORAL at 09:00

## 2024-10-24 RX ADMIN — BRIMONIDINE TARTRATE 1 DROP: 2 SOLUTION/ DROPS OPHTHALMIC at 09:01

## 2024-10-24 RX ADMIN — Medication 10 ML: at 09:06

## 2024-10-24 RX ADMIN — TERAZOSIN HYDROCHLORIDE 5 MG: 5 CAPSULE ORAL at 09:00

## 2024-10-24 RX ADMIN — QUETIAPINE FUMARATE 25 MG: 25 TABLET ORAL at 20:38

## 2024-10-24 RX ADMIN — ARIPIPRAZOLE 5 MG: 5 TABLET ORAL at 09:00

## 2024-10-24 RX ADMIN — CLONIDINE HYDROCHLORIDE 0.2 MG: 0.1 TABLET ORAL at 21:05

## 2024-10-24 RX ADMIN — INSULIN LISPRO 2 UNITS: 100 INJECTION, SOLUTION INTRAVENOUS; SUBCUTANEOUS at 17:56

## 2024-10-24 RX ADMIN — CARVEDILOL 12.5 MG: 6.25 TABLET, FILM COATED ORAL at 20:38

## 2024-10-24 RX ADMIN — VALPROIC ACID 150 MG: 250 SOLUTION ORAL at 06:28

## 2024-10-24 RX ADMIN — INSULIN LISPRO 2 UNITS: 100 INJECTION, SOLUTION INTRAVENOUS; SUBCUTANEOUS at 12:17

## 2024-10-24 RX ADMIN — BRIMONIDINE TARTRATE 1 DROP: 2 SOLUTION/ DROPS OPHTHALMIC at 20:40

## 2024-10-24 RX ADMIN — CARVEDILOL 12.5 MG: 6.25 TABLET, FILM COATED ORAL at 09:00

## 2024-10-24 RX ADMIN — ENOXAPARIN SODIUM 40 MG: 100 INJECTION SUBCUTANEOUS at 08:59

## 2024-10-24 RX ADMIN — CLONIDINE HYDROCHLORIDE 0.2 MG: 0.1 TABLET ORAL at 14:48

## 2024-10-24 RX ADMIN — ALBUTEROL SULFATE 2.5 MG: 2.5 SOLUTION RESPIRATORY (INHALATION) at 00:22

## 2024-10-24 RX ADMIN — AMOXICILLIN 875 MG: 875 TABLET, FILM COATED ORAL at 20:38

## 2024-10-24 RX ADMIN — HYDRALAZINE HYDROCHLORIDE 100 MG: 50 TABLET ORAL at 21:05

## 2024-10-24 RX ADMIN — VALPROIC ACID 150 MG: 250 SOLUTION ORAL at 14:47

## 2024-10-24 RX ADMIN — ALBUTEROL SULFATE 2.5 MG: 2.5 SOLUTION RESPIRATORY (INHALATION) at 07:30

## 2024-10-24 RX ADMIN — HYDRALAZINE HYDROCHLORIDE 100 MG: 50 TABLET ORAL at 14:48

## 2024-10-24 RX ADMIN — INSULIN LISPRO 2 UNITS: 100 INJECTION, SOLUTION INTRAVENOUS; SUBCUTANEOUS at 20:46

## 2024-10-24 RX ADMIN — Medication 10 ML: at 20:39

## 2024-10-24 RX ADMIN — TIMOLOL MALEATE 1 DROP: 5 SOLUTION/ DROPS OPHTHALMIC at 09:01

## 2024-10-24 RX ADMIN — FLUOXETINE HYDROCHLORIDE 20 MG: 20 SOLUTION ORAL at 09:06

## 2024-10-24 RX ADMIN — ALBUTEROL SULFATE 2.5 MG: 2.5 SOLUTION RESPIRATORY (INHALATION) at 19:37

## 2024-10-24 RX ADMIN — TERAZOSIN HYDROCHLORIDE 5 MG: 5 CAPSULE ORAL at 20:38

## 2024-10-24 RX ADMIN — TIMOLOL MALEATE 1 DROP: 5 SOLUTION/ DROPS OPHTHALMIC at 20:41

## 2024-10-24 RX ADMIN — CLONIDINE HYDROCHLORIDE 0.2 MG: 0.1 TABLET ORAL at 09:05

## 2024-10-24 RX ADMIN — BRIMONIDINE TARTRATE 1 DROP: 2 SOLUTION/ DROPS OPHTHALMIC at 16:38

## 2024-10-24 RX ADMIN — LANSOPRAZOLE 30 MG: 15 TABLET, ORALLY DISINTEGRATING ORAL at 06:28

## 2024-10-24 RX ADMIN — AMLODIPINE BESYLATE 10 MG: 10 TABLET ORAL at 09:00

## 2024-10-24 RX ADMIN — HYDRALAZINE HYDROCHLORIDE 100 MG: 50 TABLET ORAL at 09:04

## 2024-10-24 NOTE — PROGRESS NOTES
Clinical Nutrition     Patient Name: Omkar Nava  YOB: 1955  MRN: [id number]  Date of Encounter: 10/24/24 10:38 EDT  Admission date: 10/20/2024  Reason for Visit: Follow-up protocol, EN    Assessment   Nutrition Assessment   Admission Diagnosis:  UTI (urinary tract infection) [N39.0]  Vomiting [R11.10]  Acute encephalopathy [G93.40]    Problem List:    UTI (urinary tract infection)    Type 2 diabetes mellitus    Essential hypertension    Neurocognitive disorder    S/P transmetatarsal amputation of foot, left    Anemia of chronic disease    Hypertensive heart and chronic kidney disease 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 failure, with stage 1 through stage 4 chronic kidney disease, or unspecified chronic kidney disease    Long term (current) use of insulin    Protein calorie malnutrition    Severe vascular dementia without behavioral disturbance, psychotic disturbance, mood disturbance, or anxiety    Vomiting    Acute encephalopathy      PMH:   He  has a past medical history of Anemia, Dementia, Diabetes mellitus, Dysphagia, GERD (gastroesophageal reflux disease), History of alcohol abuse, History of cocaine use, History of marijuana use, Hypertension, Osteomyelitis, Poor historian, and Visual impairment.    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    Applicable Nutrition History:   CKD 2  T2DM, insulin use  HFpEF  Seizure disorder  Vascular dementia w/ agitation  Dysphagia s/p PEG (4/2024)    (10/22)  SLP: refused MBS today, agreeable for tomorrow; puree, nectar thick liquids, long term alternate methods of nutrition/hydration   (10/23) SLP MBS: NPO, long term alternate methods of nutrition/hydration     EN regimen: modified to continuous  (10/24) SLP: NPO, long term alternate methods of  nutrition/hydration    Labs    Labs Reviewed: Yes    Results from last 7 days   Lab Units 10/24/24  0915 10/23/24  0943 10/21/24  1136 10/20/24  2049   GLUCOSE mg/dL 181* 153* 138* 130*   BUN mg/dL 36* 33* 30* 40*   CREATININE mg/dL 1.01 1.25 1.12 1.32*   SODIUM mmol/L 140 142 143 146*   CHLORIDE mmol/L 105 107 110* 110*   POTASSIUM mmol/L 5.0 4.6 5.0 5.5*   ALT (SGPT) U/L  --  22  --  31   LACTATE mmol/L  --   --   --  0.7     Results from last 7 days   Lab Units 10/23/24  0943 10/20/24  2049   ALBUMIN g/dL 3.2* 4.0     Results from last 7 days   Lab Units 10/24/24  0748 10/23/24  2159 10/23/24  1632 10/23/24  1125 10/23/24  0757 10/22/24  2209   GLUCOSE mg/dL 181* 204* 158* 136* 192* 178*     Lab Results   Lab Value Date/Time    HGBA1C 5.60 09/15/2024 0658    HGBA1C 7.00 (H) 10/16/2022 0432    HGBA1C 8.7 (H) 06/25/2022 0242    HGBA1C 13.4 04/14/2022 1058     Medications    Medications Reviewed: yes    Scheduled Meds:albuterol, 2.5 mg, Nebulization, Q6H - RT  amLODIPine, 10 mg, Per PEG Tube, Q24H  amoxicillin, 875 mg, Per PEG Tube, Q12H  ARIPiprazole, 5 mg, Per G Tube, Daily  brimonidine, 1 drop, Both Eyes, TID  carvedilol, 12.5 mg, Per PEG Tube, Q12H  cloNIDine, 0.2 mg, Per PEG Tube, Q8H  enoxaparin, 40 mg, Subcutaneous, Daily  FLUoxetine, 20 mg, Per PEG Tube, Daily  hydrALAZINE, 100 mg, Per PEG Tube, Q8H  insulin lispro, 2-7 Units, Subcutaneous, 4x Daily AC & at Bedtime  lansoprazole, 30 mg, Per PEG Tube, Q AM  palliative care oral rinse, 5 mL, Mouth/Throat, 4x Daily  QUEtiapine, 25 mg, Per PEG Tube, Nightly  sodium chloride, 10 mL, Intravenous, Q12H  terazosin, 5 mg, Per G Tube, Q12H  timolol, 1 drop, Both Eyes, BID  Valproic Acid, 150 mg, Nasogastric, Q8H      Continuous Infusions:   PRN Meds:.  acetaminophen **OR** acetaminophen **OR** acetaminophen    senna-docusate sodium **AND** polyethylene glycol **AND** [DISCONTINUED] bisacodyl **AND** bisacodyl    dextrose    glucagon (human recombinant)     "hydrOXYzine    Magnesium Standard Dose Replacement - Follow Nurse / BPA Driven Protocol    nitroglycerin    [DISCONTINUED] ondansetron ODT **OR** ondansetron    Phosphorus Replacement - Follow Nurse / BPA Driven Protocol    Potassium Replacement - Follow Nurse / BPA Driven Protocol    sodium chloride    sodium chloride    Intake/Ouptut 24 hrs (0701 - 0700)   I&O's Reviewed: yes  Intake & Output (last day)         10/23 0701  10/24 0700 10/24 0701  10/25 0700    P.O.      Other 207 226    NG/ 1007    Total Intake(mL/kg) 1201 (15.6) 1233 (16)    Urine (mL/kg/hr) 500 (0.3) 100 (0.4)    Total Output 500 100    Net +701 +1133                (10/23) 1 Large BM    Anthropometrics   Height: Height: 172.7 cm (68\")  Last Filed Weight: Weight: 77 kg (169 lb 12.8 oz) (10/21/24 1051)  Method: Weight Method: Bed scale  BMI: BMI (Calculated): 25.8    UBW:    Weight      Weight (kg) Weight (lbs) Weight Method   4/1/2024 90.7 kg  199 lb 15.3 oz  Bed scale    4/2/2024 92.1 kg  203 lb 0.7 oz  Bed scale    5/28/2024 79.379 kg  175 lb     5/30/2024 79.379 kg  175 lb     6/6/2024 79.379 kg  175 lb     9/11/2024 79.4 kg  175 lb 0.7 oz  Estimated    9/12/2024 86.229 kg  190 lb 1.6 oz  Bed scale    9/13/2024 83.462 kg  184 lb     9/14/2024 84.505 kg  186 lb 4.8 oz     9/15/2024 84.233 kg  185 lb 11.2 oz     9/18/2024 83.734 kg  184 lb 9.6 oz  Bed scale    10/20/2024 86.183 kg  190 lb  Estimated    10/21/2024 77.021 kg  169 lb 12.8 oz  Bed scale      Weight change: KAROLYN    Nutrition Focused Physical Exam     Date: 10/21/24     Unable to perform due to Pt unable to participate at time of visit     Subjective   Reported/Observed/Food/Nutrition Related History:   10/24/24  Patient awake at time of visit. Confused conversation about paper plates on his bed and pizza that he wanted this writer to go \"downstairs\" to get him. He has been tolerating continuous feeds well. No noted GI complaints.    10/23/24  Patient failed MBS today with SLP. " "Will modify EN regimen to continuous to meet 100% of daily needs.    10/22/24  Patient would not stay awake long enough for interview today. Did say \"no\" when asked if he had any appetite. Also, stated \"I think so\" when asked if he had his tube feeds overnight. Spoke with assigned RN, who reported patient tolerated feeds overnight, however it was reported tube clogged several times? Patient said he wasn't feeling breakfast or lunch today. Also, has refused MBS today, rescheduled for tomorrow.    10/21/24   Attempted to interview patient with assigned RN at bedside, however, patient would not awaken to participate. Spoke with Marisol RN @ [facility] regarding patient's EN regimen. Reported his EN orders had been removed already, but from what she can recall he was on Glucerna, maybe @ 60ml/hr with 85ml/hr FWF x22hr and was receiving a pureed, thin liquid meal tray as well.    Needs Assessment   Date: 10/21/24, 10/23    Height used:Height: 172.7 cm (68\")  Weights used: 77kg    Estimated Calorie needs: ~ 1900 Kcal/day  Method: 25-30 Kcals/KG = 8782-7358  Method: 1.0-1.2 MSJ = 7141-4576    Estimated Protein needs: ~ 90g PRO/day  Method: 0.8-1.2g/Kg = 61-92g pro    Estimated Fluid needs: ~ ml/day   Per clinical status    Current Nutrition Prescription   PO: NPO Diet NPO Type: Strict NPO  Oral Nutrition Supplement: n/a  Intake: N/A    EN: DiabetiSource AC  Goal Rate: 70ml/hr  Water Flushes: 15ml/hr  Modular: None  Route: PEG  Tube: 20 PEG    EN @ goal over 22hrs to supply:  Goal Volume 1540 mL/day     Flush Volume 330 mL/day     Energy 1848 Kcal/day 97 % Est Need   Protein 92 g/day 102 % Est Need   Fiber 23 g/day     Water in  EN 1263 mL     Total Water 1593 mL     Meet DRI Y        --------------------------------------------------------------------------  Product/Rate verified at bedside:   Infusing Rate at time of visit: 70/15    Average Delivery from [facility] Day:   Volume 994 mL/day   % Goal Vol.   Flush Volume 207 " mL/day     Energy  Kcal/day  % Est Need   Protein  g/day  % Est Need   Fiber  g/day     Water in  EN  mL     Total Water  mL     Meet DRI         Assessment & Plan   Nutrition Diagnosis   Date: 10/21/24  Updated:   Problem Swallowing difficulty    Etiology Oropharyngeal dysphagia   Signs/Symptoms PEG and modified PO diet   Status: New    Goal:   Nutrition to support treatment and Maintain EN    Nutrition Intervention      Follow treatment progress, Care plan reviewed    Continue continuous EN regimen at this time: Diabetisource AC @ 70ml/hr + 15ml/hr FWF x22hr  Will adjust EN/FWF PRN    Monitoring/Evaluation:   Per protocol, I&O, Pertinent labs, EN delivery/tolerance, Weight, GI status, Symptoms, POC/GOC, Swallow function    Marija Yo, MS,RD,LD  Time Spent: 30min

## 2024-10-24 NOTE — NURSING NOTE
"Pts. Daughter verbalized that she \"turned off his feeds because he needs real food not this.\" I then turned feeding pump back, on and informed the daughter the feeds need to be delivered as ordered and explained why he would need to stay NPO for the time being. Daughter verbalized understanding.  "

## 2024-10-24 NOTE — PLAN OF CARE
Pt was seen by PT for follow up swallow. Unable to perform test. Pt on diabetisource tube feed at goal rate. Continue NPO order. VSS stable. Pt demeanor calm and cooperative. Oral care done and improved. Pt was agitated and combative at beginning of shift due to confusion. Progressively became more accepting of care. Pt is calmly resting comfortable at this time.     Problem: Adult Inpatient Plan of Care  Goal: Plan of Care Review  Outcome: Progressing  Goal: Patient-Specific Goal (Individualized)  Outcome: Progressing  Goal: Absence of Hospital-Acquired Illness or Injury  Outcome: Progressing  Intervention: Identify and Manage Fall Risk  Recent Flowsheet Documentation  Taken 10/24/2024 1600 by Marcelle Langley RN  Safety Promotion/Fall Prevention:   activity supervised   safety round/check completed  Taken 10/24/2024 1400 by Marcelle Langley RN  Safety Promotion/Fall Prevention: activity supervised  Taken 10/24/2024 1200 by Marcelle Langley RN  Safety Promotion/Fall Prevention: activity supervised  Taken 10/24/2024 1000 by Marcelle Langley RN  Safety Promotion/Fall Prevention:   activity supervised   assistive device/personal items within reach   safety round/check completed  Taken 10/24/2024 0800 by Marcelle Langley RN  Safety Promotion/Fall Prevention:   activity supervised   safety round/check completed  Intervention: Prevent Skin Injury  Recent Flowsheet Documentation  Taken 10/24/2024 1639 by Marcelle Langley RN  Body Position:   turned   left  Taken 10/24/2024 1600 by Marcelle Langley RN  Body Position:   turned   right  Skin Protection: incontinence pads utilized  Taken 10/24/2024 1400 by Marcelle Langley RN  Body Position: sitting up in bed  Skin Protection: incontinence pads utilized  Taken 10/24/2024 1200 by Marcelle Langley RN  Body Position:   turned   left  Skin Protection: incontinence pads utilized  Taken 10/24/2024 1000 by Marcelle Langley RN  Body Position:   turned   left  Skin Protection: incontinence pads  utilized  Taken 10/24/2024 0800 by Marcelle Langley RN  Body Position:   supine   legs elevated  Skin Protection: incontinence pads utilized  Intervention: Prevent and Manage VTE (Venous Thromboembolism) Risk  Recent Flowsheet Documentation  Taken 10/24/2024 1600 by Marcelle Langley RN  VTE Prevention/Management: (lovenox) other (see comments)  Taken 10/24/2024 1200 by Marcelle Langley RN  VTE Prevention/Management: (lovenox) other (see comments)  Taken 10/24/2024 1000 by Marcelle Langley RN  VTE Prevention/Management: (lovenox) other (see comments)  Taken 10/24/2024 0800 by Marcelle Langley RN  VTE Prevention/Management: (lovenox) other (see comments)  Intervention: Prevent Infection  Recent Flowsheet Documentation  Taken 10/24/2024 1600 by Marcelle Langley RN  Infection Prevention:   cohorting utilized   environmental surveillance performed  Taken 10/24/2024 1400 by Marcelle Langley RN  Infection Prevention:   cohorting utilized   environmental surveillance performed  Taken 10/24/2024 1200 by Marcelle Langley RN  Infection Prevention:   cohorting utilized   environmental surveillance performed  Taken 10/24/2024 1000 by Marcelle Langley RN  Infection Prevention:   cohorting utilized   environmental surveillance performed   single patient room provided  Taken 10/24/2024 0800 by Marcelle Langley RN  Infection Prevention: environmental surveillance performed  Goal: Optimal Comfort and Wellbeing  Outcome: Progressing  Intervention: Provide Person-Centered Care  Recent Flowsheet Documentation  Taken 10/24/2024 0800 by Marcelle Langley RN  Trust Relationship/Rapport:   care explained   choices provided  Goal: Readiness for Transition of Care  Outcome: Progressing     Problem: Skin Injury Risk Increased  Goal: Skin Health and Integrity  Outcome: Progressing  Intervention: Optimize Skin Protection  Recent Flowsheet Documentation  Taken 10/24/2024 1639 by Marcelle Langley RN  Head of Bed (HOB) Positioning: HOB at 20-30 degrees  Taken  10/24/2024 1600 by Marcelle Langley RN  Activity Management: activity minimized  Pressure Reduction Techniques:   weight shift assistance provided   pressure points protected  Head of Bed (HOB) Positioning: HOB at 20-30 degrees  Pressure Reduction Devices: pressure-redistributing mattress utilized  Skin Protection: incontinence pads utilized  Taken 10/24/2024 1400 by Marcelle Langley RN  Activity Management: activity minimized  Pressure Reduction Techniques: frequent weight shift encouraged  Head of Bed (HOB) Positioning: HOB at 30-45 degrees  Pressure Reduction Devices: pressure-redistributing mattress utilized  Skin Protection: incontinence pads utilized  Taken 10/24/2024 1200 by Marcelle Langley RN  Activity Management: activity minimized  Pressure Reduction Techniques:   frequent weight shift encouraged   weight shift assistance provided  Head of Bed (HOB) Positioning: HOB at 20-30 degrees  Pressure Reduction Devices: pressure-redistributing mattress utilized  Skin Protection: incontinence pads utilized  Taken 10/24/2024 1000 by Marcelle Langley RN  Activity Management: activity minimized  Pressure Reduction Techniques: weight shift assistance provided  Head of Bed (HOB) Positioning: HOB at 20-30 degrees  Pressure Reduction Devices: pressure-redistributing mattress utilized  Skin Protection: incontinence pads utilized  Taken 10/24/2024 0800 by Marcelle Langley RN  Activity Management: activity minimized  Pressure Reduction Techniques: frequent weight shift encouraged  Head of Bed (HOB) Positioning: HOB at 30 degrees  Pressure Reduction Devices: pressure-redistributing mattress utilized  Skin Protection: incontinence pads utilized     Problem: Fall Injury Risk  Goal: Absence of Fall and Fall-Related Injury  Outcome: Progressing  Intervention: Identify and Manage Contributors  Recent Flowsheet Documentation  Taken 10/24/2024 1600 by Marcelle Langley RN  Medication Review/Management: medications reviewed  Self-Care  Promotion:   BADL personal objects within reach   BADL personal routines maintained  Taken 10/24/2024 1400 by Marcelle Langley RN  Medication Review/Management: medications reviewed  Self-Care Promotion:   BADL personal objects within reach   BADL personal routines maintained  Taken 10/24/2024 1200 by Marcelle Langley RN  Medication Review/Management: medications reviewed  Self-Care Promotion:   BADL personal objects within reach   BADL personal routines maintained  Taken 10/24/2024 1000 by Marcelle Langley RN  Medication Review/Management: medications reviewed  Taken 10/24/2024 0800 by Marcelle Langley RN  Medication Review/Management: medications reviewed  Self-Care Promotion: BADL personal routines maintained  Intervention: Promote Injury-Free Environment  Recent Flowsheet Documentation  Taken 10/24/2024 1600 by Marcelle Langley RN  Safety Promotion/Fall Prevention:   activity supervised   safety round/check completed  Taken 10/24/2024 1400 by Marcelle Langley RN  Safety Promotion/Fall Prevention: activity supervised  Taken 10/24/2024 1200 by Marcelle Langley RN  Safety Promotion/Fall Prevention: activity supervised  Taken 10/24/2024 1000 by Marcelle Langley RN  Safety Promotion/Fall Prevention:   activity supervised   assistive device/personal items within reach   safety round/check completed  Taken 10/24/2024 0800 by Marcelle Langley RN  Safety Promotion/Fall Prevention:   activity supervised   safety round/check completed   Goal Outcome Evaluation:

## 2024-10-24 NOTE — PROGRESS NOTES
"    [facility] Medicine Services  PROGRESS NOTE    Patient Name: Omkar Nava  : 1955  MRN: [id number]    Date of Admission: 10/20/2024  Primary Care Physician: Alberto Dye MD    Subjective   Subjective     CC:  Vomiting    HPI:  Patient much more alert this AM. Does not answer questions as he states he \"doesn't want to\". Discussed with patient we are awaiting continued speech evaluation prior to eating.       Objective   Objective     Vital Signs:   Temp:  [96.9 °F (36.1 °C)-98.2 °F (36.8 °C)] 96.9 °F (36.1 °C)  Heart Rate:  [52-64] 57  Resp:  [14-18] 16  BP: (104-150)/(54-77) 104/77  Flow (L/min) (Oxygen Therapy):  [0-2] 0     Physical Exam  Constitutional:       General: He is not in acute distress.  Eyes:      Extraocular Movements: Extraocular movements intact.   Cardiovascular:      Rate and Rhythm: Normal rate.      Pulses: Normal pulses.   Pulmonary:      Effort: Pulmonary effort is normal. No respiratory distress.   Abdominal:      General: There is no distension.      Palpations: Abdomen is soft.      Tenderness: There is no abdominal tenderness. There is no guarding or rebound.   Musculoskeletal:      Right lower leg: No edema.      Left lower leg: No edema.   Skin:     General: Skin is warm.   Neurological:      Mental Status: Mental status is at baseline.          Results Reviewed:  LAB RESULTS:      Lab 10/24/24  0915 10/23/24  0943 10/22/24  1317 10/20/24  2049   WBC 15.34* 14.01* 5.80 8.28   HEMOGLOBIN 8.6* 8.5* 8.4* 8.2*   HEMATOCRIT 26.0* 25.7* 26.3* 25.3*   PLATELETS 206 221 206 232   NEUTROS ABS 12.42* 11.60* 3.80 6.79   IMMATURE GRANS (ABS) 0.11* 0.07* 0.01 0.05   LYMPHS ABS 1.50 1.19 1.30 0.99   MONOS ABS 1.13* 1.11* 0.57 0.43   EOS ABS 0.16 0.02 0.11 0.01   MCV 93.5 93.1 92.6 94.8   PROCALCITONIN  --   --  0.05 0.13   LACTATE  --   --   --  0.7         Lab 10/24/24  0915 10/23/24  0943 10/21/24  1136 10/20/24  2049   SODIUM 140 142 143 146*   POTASSIUM 5.0 " 4.6 5.0 5.5*   CHLORIDE 105 107 110* 110*   CO2 27.0 25.0 23.0 25.0   ANION GAP 8.0 10.0 10.0 11.0   BUN 36* 33* 30* 40*   CREATININE 1.01 1.25 1.12 1.32*   EGFR 81.5 63.1 72.0 59.1*   GLUCOSE 181* 153* 138* 130*   CALCIUM 9.3 8.8 9.5 9.6         Lab 10/23/24  0943 10/20/24  2049   TOTAL PROTEIN 5.9* 7.6   ALBUMIN 3.2* 4.0   GLOBULIN 2.7 3.6   ALT (SGPT) 22 31   AST (SGOT) 13 22   BILIRUBIN <0.2 <0.2   ALK PHOS 92 97   LIPASE  --  18                     Lab 10/22/24  1325   FIO2 28   CARBOXYHEMOGLOBIN (VENOUS) 1.0     Brief Urine Lab Results  (Last result in the past 365 days)        Color   Clarity   Blood   Leuk Est   Nitrite   Protein   CREAT   Urine HCG        10/20/24 2123 Yellow   Clear   Negative   Negative   Negative   100 mg/dL (2+)                   Microbiology Results Abnormal       Procedure Component Value - Date/Time    Blood Culture - Blood, Arm, Right [[id number]]  (Normal) Collected: 10/21/24 0107    Lab Status: Preliminary result Specimen: Blood from Arm, Right Updated: 10/24/24 0215     Blood Culture No growth at 3 days    Blood Culture - Blood, Hand, Right [[id number]]  (Normal) Collected: 10/21/24 0107    Lab Status: Preliminary result Specimen: Blood from Hand, Right Updated: 10/24/24 0215     Blood Culture No growth at 3 days            FL Video Swallow With Speech Single Contrast    Result Date: 10/23/2024  FL VIDEO SWALLOW W SPEECH SINGLE-CONTRAST Date of Exam: 10/23/2024 1:31 PM EDT Indication: dysphagia.   Comparison: None available. Technique:   The speech pathologist administered food and/or liquid mixed with barium to the patient with cine/video imaging.  Imaging assistance was provided to the speech pathologist and an image was saved. Fluoroscopic Time: 48 seconds Number of Images: 6 associated fluoroscopic loops were saved Findings: Aspiration was seen during fluoroscopic guided modified barium swallowing series. Please see speech therapy report for full details and recommendations.      Impression: Impression: Fluoroscopy provided for a modified barium swallow. Aspiration was seen during swallowing evaluation. Please see speech therapy report for full details and recommendations. Report dictated by: Maggi Silverman PA-c  I have personally reviewed this case and agree with the findings above: Electronically Signed: Wisam Oquendo MD  10/23/2024 5:02 PM EDT  Workstation ID: [id number]     Results for orders placed during the hospital encounter of 01/15/24    Adult Transthoracic Echo Complete With Contrast if Necessary Per Protocol    Interpretation Summary    Left ventricular ejection fraction appears to be 56 - 60%.    Left ventricular wall thickness is consistent with hypertrophy.    Estimated right ventricular systolic pressure from tricuspid regurgitation is mildly elevated (35-45 mmHg).    There is a small (1-2cm) pericardial effusion.    No evidence for pericardial tamponade      Current medications:  Scheduled Meds:albuterol, 2.5 mg, Nebulization, Q6H - RT  amLODIPine, 10 mg, Per PEG Tube, Q24H  amoxicillin, 875 mg, Per PEG Tube, Q12H  ARIPiprazole, 5 mg, Per G Tube, Daily  brimonidine, 1 drop, Both Eyes, TID  carvedilol, 12.5 mg, Per PEG Tube, Q12H  cloNIDine, 0.2 mg, Per PEG Tube, Q8H  enoxaparin, 40 mg, Subcutaneous, Daily  FLUoxetine, 20 mg, Per PEG Tube, Daily  hydrALAZINE, 100 mg, Per PEG Tube, Q8H  insulin lispro, 2-7 Units, Subcutaneous, 4x Daily AC & at Bedtime  lansoprazole, 30 mg, Per PEG Tube, Q AM  palliative care oral rinse, 5 mL, Mouth/Throat, 4x Daily  QUEtiapine, 25 mg, Per PEG Tube, Nightly  sodium chloride, 10 mL, Intravenous, Q12H  terazosin, 5 mg, Per G Tube, Q12H  timolol, 1 drop, Both Eyes, BID  Valproic Acid, 150 mg, Nasogastric, Q8H      Continuous Infusions:   PRN Meds:.  acetaminophen **OR** acetaminophen **OR** acetaminophen    senna-docusate sodium **AND** polyethylene glycol **AND** [DISCONTINUED] bisacodyl **AND** bisacodyl    dextrose    glucagon (human  recombinant)    hydrOXYzine    Magnesium Standard Dose Replacement - Follow Nurse / BPA Driven Protocol    nitroglycerin    [DISCONTINUED] ondansetron ODT **OR** ondansetron    Phosphorus Replacement - Follow Nurse / BPA Driven Protocol    Potassium Replacement - Follow Nurse / BPA Driven Protocol    sodium chloride    sodium chloride    Assessment & Plan   Assessment & Plan     Active Hospital Problems    Diagnosis  POA    **UTI (urinary tract infection) [N39.0]  Yes    Acute encephalopathy [G93.40]  Yes    Vomiting [R11.10]  Yes    Severe vascular dementia without behavioral disturbance, psychotic disturbance, mood disturbance, or anxiety [F01.C0]  Yes    Protein calorie malnutrition [E46]  Yes    S/P transmetatarsal amputation of foot, left [Z89.432]  Not Applicable    Anemia of chronic disease [D63.8]  Yes    Neurocognitive disorder [R41.9]  Yes    Type 2 diabetes mellitus [E11.9]  Yes    Essential hypertension [I10]  Yes    Hypertensive heart and chronic kidney disease 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 failure, with stage 1 through stage 4 chronic kidney disease, or unspecified chronic kidney disease [I13.10]  Yes    Long term (current) use of insulin [Z79.4]  Not Applicable      Resolved Hospital Problems   No resolved problems to display.        Brief Hospital Course to date:  Omkar Nava is a 69 y.o. male with a PMHx of hypertension, diabetes, chronic kidney disease, vascular dementia, dysphagia, and chronic iron def anemia admitted with increased vomiting, with concern for possible underlying infection.     Acute encephalopathy likely secondary to infection  UTI/viral pneumonia  --UA with 4+ bacteria and urine cultures growing Enterococcus faecalis.    -- In setting of worsening leukocytosis, will treat with amoxicillin for 5 days.  -- Respiratory panel positive for rhinovirus  -- follow up blood cultures      Hyperkalemia.  resolved  --s/p lokelma     JAYY on CKD-stage 2. Resolved   -Baseline creatinine near  1.1-1.2  -Creatinine today 1.25  -Status post IVF     Chronic aspiration  - PEG tube in place  --Nutrition to recommend TF  --Pt with pureed foods, nectar thick liquids per daughter.--although this is different from SLP notes from last admission  --SLP consulted     Chronic anemia  - At baseline, cont to monitor.     HFpEF  HTN  --Echo with EF of 56 to 60%  - Continue carvedilol, clonidine, terazosin, hydralazine  -- Hold diuretics     T2DM  - A1c 5.6 last hospitalization  --Due to poor intake, hold levemir for now.  Cover with SSI.     Dementia with agitation  Questionable seizure disorder  - Seroquel, Depakote, Abilify, prozac  -- Valproic acid subtherapeutic     Expected Discharge Location and Transportation: D  Expected Discharge   Expected Discharge Date: 10/23/2024; Expected Discharge Time:      VTE Prophylaxis:  Pharmacologic VTE prophylaxis orders are present.         AM-PAC 6 Clicks Score (PT): 6 (10/24/24 0800)    CODE STATUS:   Code Status and Medical Interventions: CPR (Attempt to Resuscitate); Full Support   Ordered at: 10/21/24 0146     Level Of Support Discussed With:    Health Care Surrogate     Code Status (Patient has no pulse and is not breathing):    CPR (Attempt to Resuscitate)     Medical Interventions (Patient has pulse or is breathing):    Full Support       Ryne Burgos DO  10/24/24

## 2024-10-24 NOTE — PLAN OF CARE
Goal Outcome Evaluation:  Plan of Care Reviewed With: patient        Progress: no change       Anticipated Discharge Disposition (SLP): skilled nursing facility             Treatment Assessment (SLP): suspected, continued, oral dysphagia, pharyngeal dysphagia (10/24/24 1130)  Treatment Assessment Comments (SLP): Hospitalist requested re-assessment today. Pt was agitated and combative w staff earlier, but is lethargic during SLP session. Unfortunately, pt accepts only a single trial of ntl water at bedside before he begins cursing at me and refuses any further PO attempts. He makes no attempt to complete any oral or pharyngeal exercise,suspect combination of desire, confusion, and lethargy. Until pt is more consistently awake, alert, cooperative, and accepting more PO trials at bedside, SLP is u/a to recommend instrumental or safe PO. Will continue to follow. (10/24/24 1130)  Plan for Continued Treatment (SLP): continue treatment per plan of care (10/24/24 1130)

## 2024-10-24 NOTE — THERAPY TREATMENT NOTE
Acute Care - Speech Language Pathology   Swallow Treatment Note [facility]     Patient Name: Omkar Nava  : 1955  MRN: [id number]  Today's Date: 10/24/2024               Admit Date: 10/20/2024    Visit Dx:     ICD-10-CM ICD-9-CM   1. Acute cystitis without hematuria  N30.00 595.0   2. Pneumonia due to infectious organism, unspecified laterality, unspecified part of lung  J18.9 486   3. 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, without long-term current use of insulin    Acute osteomyelitis of left foot    Type 2 diabetes mellitus    Essential hypertension    Psychotic disorder    Neurocognitive disorder    S/P transmetatarsal amputation of foot, left    Abscess of left foot    Anemia of chronic disease    Aspiration pneumonia    Cervical spine pain    Chronic kidney disease    Closed head injury without loss of consciousness    Dementia    Dental cavities    Diarrhea of presumed infectious origin    Displaced fracture of proximal phalanx of left great toe, initial encounter for open fracture    Dysphagia    Erectile dysfunction    Generalized muscle weakness    Hallucinations    Hypertensive heart and chronic kidney disease 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 failure, with stage 1 through stage 4 chronic kidney disease, or unspecified chronic kidney disease    Insomnia due to medical condition    Iron deficiency anemia    Laceration without foreign body, left foot, subsequent encounter    Long term (current) use of insulin    Personal history of Methicillin resistant Staphylococcus aureus infection    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    Protein calorie malnutrition    Simple chronic bronchitis    Tobacco use    Type 2 diabetes mellitus with diabetic neuropathy, unspecified    Type 2 diabetes mellitus with diabetic chronic kidney disease    Acute type 1  respiratory failure    Severe vascular dementia without behavioral disturbance, psychotic disturbance, mood disturbance, or anxiety    UTI (urinary tract infection)    Vomiting    Acute encephalopathy     Past Medical History:   Diagnosis Date    Anemia     Dementia     Diabetes mellitus     Dysphagia     GERD (gastroesophageal reflux disease)     History of alcohol abuse     History of cocaine use     History of marijuana use     Hypertension     Osteomyelitis     Poor historian     records obtained from nursing home records & his family    Visual impairment      Past Surgical History:   Procedure Laterality Date    AMPUTATION FOOT Left 10/18/2022    Procedure: PARTIAL FIRST RAY AMPUTATION LEFT;  Surgeon: Yeison Petty MD;  Location:  [facility];  Service: Orthopedics;  Laterality: Left;    AMPUTATION FOOT Left 12/5/2022    Procedure: Transmetatarsal of Left Foot;  Surgeon: Yeison Petty MD;  Location:  [facility];  Service: Orthopedics;  Laterality: Left;    ENDOSCOPY N/A 3/14/2024    Procedure: ESOPHAGOGASTRODUODENOSCOPY WITH JEJUNAL TUBE INSERTION AT BEDSIDE;  Surgeon: Brunner, Mark I, MD;  Location:  [facility];  Service: Gastroenterology;  Laterality: N/A;    ENDOSCOPY W/ PEG TUBE PLACEMENT N/A 4/1/2024    Procedure: ESOPHAGOGASTRODUODENOSCOPY WITH PERCUTANEOUS ENDOSCOPIC GASTROSTOMY TUBE INSERTION;  Surgeon: Brunner, Mark I, MD;  Location:  [facility];  Service: Gastroenterology;  Laterality: N/A;  EGD with PEG placement.  Secured at 3.5 cm    EYE SURGERY         SLP Recommendation and Plan    SLP Diet Recommendation: NPO, long term alternate methods of nutrition/hydration (10/24/24 1130)     SLP Rec. for Method of Medication Administration: meds via alternate route (10/24/24 1130)     Monitor for Signs of Aspiration: notify SLP if any concerns (10/24/24 1130)    Swallow Criteria for Skilled Therapeutic Interventions Met: demonstrates skilled criteria (10/23/24 1320)  Anticipated Discharge Disposition  (SLP): skilled nursing facility (10/24/24 1130)  Rehab Potential/Prognosis, Swallowing: re-evaluate goals as necessary (10/23/24 1320)  Therapy Frequency (Swallow): 5 days per week (10/24/24 1130)  Predicted Duration Therapy Intervention (Days): 2 weeks (10/24/24 1130)  Oral Care Recommendations: Oral Care BID/PRN, Suction toothbrush (10/24/24 1130)        Daily Summary of Progress (SLP): progress toward functional goals is gradual (10/24/24 1130)               Treatment Assessment (SLP): suspected, continued, oral dysphagia, pharyngeal dysphagia (10/24/24 1130)  Treatment Assessment Comments (SLP): Hospitalist requested re-assessment today. Pt was agitated and combative w staff earlier, but is lethargic during SLP session. Unfortunately, pt accepts only a single trial of ntl water at bedside before he begins cursing at me and refuses any further PO attempts. He makes no attempt to complete any oral or pharyngeal exercise,suspect combination of desire, confusion, and lethargy. Until pt is more consistently awake, alert, cooperative, and accepting more PO trials at bedside, SLP is u/a to recommend instrumental or safe PO. Will continue to follow. (10/24/24 1130)  Plan for Continued Treatment (SLP): continue treatment per plan of care (10/24/24 1130)         Progress: no change      SWALLOW EVALUATION (Last 72 Hours)       SLP Adult Swallow Evaluation       Row Name 10/24/24 1130 10/23/24 1320 10/22/24 1000 10/21/24 1350          Rehab Evaluation    Document Type therapy note (daily note)  -RS evaluation  -RS re-evaluation  -CH evaluation  -CJ     Subjective Information complains of  -RS no complaints  -RS no complaints  -CH no complaints  -CJ     Patient Observations lethargic;poorly cooperative  -RS lethargic;poorly cooperative  -RS poorly cooperative  -CH cooperative  -CJ     Patient/Family/Caregiver Comments/Observations none present  -RS none present in radiology  -RS none present  -CH no family present  -CJ      Patient Effort poor  -RS fair  -RS adequate  -CH adequate  -CJ     Symptoms Noted During/After Treatment none  -RS none  -RS none  -CH none  -CJ     Oral Care patient refused intervention  -RS -- patient refused intervention  -CH suction provided  -CJ        General Information    Patient Profile Reviewed -- -- yes  -CH yes  -CJ     Pertinent History Of Current Problem -- -- see initial evaluation. [facility] [place], however, pt declined, stating he doesn't feel well. SLP f/u completed to assess patient with current pureed and nectar thick liquid diet as pt did not eat am meal.  - Pt adm w/ UTI/vomitting; sig h/o dementia, HTN, HTN, CKD, aspiration PNA, DM2 bronchitis. [facility] Reported to nursing pt has been on puree/nectar at [facility]  -     Current Method of Nutrition -- -- pureed;nectar/syrup-thick liquids  - pureed;nectar/syrup-thick liquids  -     Precautions/Limitations, Vision -- -- vision impairment, bilaterally  - vision impairment, bilaterally  -     Precautions/Limitations, Hearing -- -- WFL;for purposes of eval  - WFL;for purposes of eval  -     Prior Level of Function-Communication -- -- cognitive-linguistic impairment  - cognitive-linguistic impairment  -     Prior Level of Function-Swallowing -- -- mechanical ground textures;thin liquids;alternative feeding method;other (see comments)  has PEG  - mechanical ground textures;thin liquids;alternative feeding method  has PEG  -     Plans/Goals Discussed with -- -- patient  - patient  -     Barriers to Rehab -- -- medically complex;visual deficit;previous functional deficit;cognitive status  - medically complex;visual deficit;previous functional deficit;cognitive status  -     Patient's Goals for Discharge -- -- no concerns voiced  - no concerns voiced  -        Pain    Pretreatment Pain Rating -- -- 0/10 - no pain  - --     Posttreatment Pain Rating --  -- 0/10 - no pain  - --     Additional Documentation Pain Scale: FACES Pre/Post-Treatment (Group)  -RS Pain Scale: FACES Pre/Post-Treatment (Group)  -RS -- Pain Scale: FACES Pre/Post-Treatment (Group)  -CJ        Pain Scale: FACES Pre/Post-Treatment    Pain: FACES Scale, Pretreatment 0-->no hurt  -RS 0-->no hurt  -RS -- 0-->no hurt  -CJ     Posttreatment Pain Rating 0-->no hurt  -RS 0-->no hurt  -RS -- 0-->no hurt  -CJ        Oral Motor Structure and Function    Dentition Assessment -- -- missing teeth  - missing teeth  -     Secretion Management -- -- anterior loss  - anterior loss  -     Mucosal Quality -- -- sticky  -CH sticky  -        Oral Musculature and Cranial Nerve Assessment    Oral Motor General Assessment -- -- generalized oral motor weakness  - generalized oral motor weakness  -        General Eating/Swallowing Observations    Respiratory Support Currently in Use -- -- nasal cannula  - nasal cannula  -     O2 Liters -- -- 2L  -CH 2L  -CJ     Eating/Swallowing Skills -- -- fed by SLP;unable to perform self-feeding  - fed by SLP;unable to perform self-feeding  -     Positioning During Eating -- -- upright 90 degree;upright in bed  - upright in bed  -     Utensils Used -- -- spoon;cup;straw  - spoon;cup;straw  -     Consistencies Trialed -- -- pureed;thin liquids;ice chips;nectar/syrup-thick liquids  - pureed;thin liquids;ice chips;nectar/syrup-thick liquids  -        Clinical Swallow Eval    Oral Prep Phase -- -- impaired  - impaired  -     Oral Transit -- -- impaired  -CH impaired  -     Oral Residue -- -- WFL  -CH WFL  -CJ     Pharyngeal Phase -- -- suspected pharyngeal impairment  - suspected pharyngeal impairment  -     Esophageal Phase -- -- unremarkable  - unremarkable  -        Oral Prep Concerns    Oral Prep Concerns -- -- increased prep time;incomplete or weak lip closure around spoon  - increased prep time  -     Increased Prep Time -- --  pudding  - pudding  -        Oral Transit Concerns    Oral Transit Concerns -- -- delayed initiation of bolus transit  - delayed initiation of bolus transit  -     Delayed Intiation of Bolus Transit -- -- all consistencies  - all consistencies  -        Pharyngeal Phase Concerns    Pharyngeal Phase Concerns -- -- throat clear;wet vocal quality  - throat clear;wet vocal quality  -     Wet Vocal Quality -- -- thin  -CH thin  -CJ     Throat Clear -- -- thin  -CH thin  -CJ     Pharyngeal Phase Concerns, Comment -- -- -- No s/s w/ trials of nectar thick liquids, Pt declined to accept trials of solids. okay for puree/nectar when fully awake/alert today and plan to complete MBS tomorrow. If any concerns please make NPO and utilize PEG tube  -        MBS/VFSS    Utensils Used -- spoon  -RS -- --     Consistencies Trialed -- pureed;thin liquids;nectar/syrup-thick liquids  -RS -- --        MBS/VFSS Interpretation    Oral Prep Phase -- impaired oral phase of swallowing  -RS -- --     Oral Transit Phase -- impaired  -RS -- --     Oral Residue -- impaired  -RS -- --        Oral Preparatory Phase    Oral Preparatory Phase -- reduced lip opening;anterior loss;oral holding;prolonged manipulation;inadequate manipulation;bolus removed from mouth manually  -RS -- --     Reduced Lip Opening -- all consistencies tested  -RS -- --     Anterior Loss -- thin liquids;secondary to reduced labial seal;secondary to impaired cognitive status  -RS -- --     Oral Holding -- all consistencies tested;secondary to impaired cognitive status  -RS -- --     Prolonged Manipulation -- pudding/puree;secondary to impaired cognitive status  -RS -- --     Inadequate Manipulation -- pudding/puree;secondary to impaired cognitive status  -RS -- --     Bolus Removed from Mouth Manually -- pudding/puree;secondary to impaired cognitive status  -RS -- --        Oral Transit Phase    Impaired Oral Transit Phase -- premature spillage of liquids into  pharynx  -RS -- --     Premature Spillage of Liquids into Pharynx -- thin liquids;nectar-thick liquids;secondary to reduced lingual control;secondary to impaired cognitive status  -RS -- --        Oral Residue    Impaired Oral Residue -- diffuse residue throughout oral cavity  -RS -- --     Diffuse Residue throughout Oral Cavity -- pudding/puree;secondary to reduced lingual strength;secondary to reduced lingual range of motion  -RS -- --     Response to Oral Residue -- unable to clear residue;other (see comments)  oral cavity cleared by SLP  -RS -- --        Initiation of Pharyngeal Swallow    Initiation of Pharyngeal Swallow -- bolus in pyriform sinuses  -RS -- --     Pharyngeal Phase -- impaired pharyngeal phase of swallowing;unable to assess;unable/unwilling to cooperate  -RS -- --     Penetration Before the Swallow -- thin liquids;secondary to reduced back of tongue control;secondary to delayed swallow initiation or mistiming  -RS -- --     Aspiration During the Swallow -- thin liquids;secondary to delayed swallow initiation or mistiming;secondary to reduced laryngeal elevation;other (see comments)  aspiration during the swallow of penetrated material 2/2  -RS -- --     Response to Penetration -- No  -RS -- --     No spontaneous response to penetration and -- could not produce cough response despite cue  -RS -- --     Response to Aspiration -- No  -RS -- --     No spontaneous response to aspiration and -- could not produce cough response despite cue  -RS -- --     Rosenbek's Scale -- thin:;8--->level 8  -RS -- --     Pharyngeal Residue -- nectar-thick liquids;diffuse within pharynx;secondary to reduced base of tongue retraction;secondary to reduced posterior pharyngeal wall stripping;secondary to reduced laryngeal elevation;secondary to reduced hyolaryngeal excursion  -RS -- --     Response to Residue -- unable to clear residue  -RS -- --     Attempted Compensatory Maneuvers -- other (see comments)  pt u/a to  maintain appropriate alertness for continuation of study and therefore no maneuvers were attempted or considered  -RS -- --     Pharyngeal Phase, Comment -- Level of lethargy precludes safe PO intake. Secure chat b/w RN and MD, u/a to rec safe PO at this time. Resume nutritional support via PEG. SLP will cont to follow and consider PO when pt consistently awake and alert  -RS -- --        Swallowing Quality of Life Assessment    Education and counseling provided -- -- Risks of aspiration;Oral care recommendations and rationale  -CH --        SLP Evaluation Clinical Impression    SLP Swallowing Diagnosis -- mod-severe;oral dysphagia;suspected pharyngeal dysphagia  -RS oral dysphagia;suspected pharyngeal dysphagia  -CH oral dysphagia;suspected pharyngeal dysphagia  -CJ     Functional Impact -- risk of aspiration/pneumonia;risk of malnutrition  -RS risk of aspiration/pneumonia  -CH risk of aspiration/pneumonia  -CJ     Rehab Potential/Prognosis, Swallowing -- re-evaluate goals as necessary  -RS adequate, monitor progress closely  -CH adequate, monitor progress closely  -CJ     Swallow Criteria for Skilled Therapeutic Interventions Met -- demonstrates skilled criteria  -RS demonstrates skilled criteria  -CH demonstrates skilled criteria  -CJ        SLP Treatment Clinical Impressions    Treatment Assessment (SLP) suspected;continued;oral dysphagia;pharyngeal dysphagia  -RS -- -- --     Treatment Assessment Comments (SLP) Hospitalist requested re-assessment today. Pt was agitated and combative w staff earlier, but is lethargic during SLP session. Unfortunately, pt accepts only a single trial of ntl water at bedside before he begins cursing at me and refuses any further PO attempts. He makes no attempt to complete any oral or pharyngeal exercise,suspect combination of desire, confusion, and lethargy. Until pt is more consistently awake, alert, cooperative, and accepting more PO trials at bedside, SLP is u/a to recommend  instrumental or safe PO. Will continue to follow.  - -- -- --     Daily Summary of Progress (SLP) progress toward functional goals is gradual  - -- -- --     Barriers to Overall Progress (SLP) Lethargy;Cognitive status;Resistant to information  - -- -- --     Plan for Continued Treatment (SLP) continue treatment per plan of care  -RS -- -- --     Care Plan Review care plan/treatment goals reviewed  -RS -- -- --        Recommendations    Therapy Frequency (Swallow) 5 days per week  - -- evaluation only  - --     Predicted Duration Therapy Intervention (Days) 2 weeks  - -- -- --     SLP Diet Recommendation NPO;long term alternate methods of nutrition/hydration  - NPO;long term alternate methods of nutrition/hydration  - puree;nectar thick liquids;long term alternate methods of nutrition/hydration  - puree;nectar thick liquids;long term alternate methods of nutrition/hydration  -     Recommended Diagnostics -- reassess via clinical swallow evaluation;other (see comments)  when consistently awake and alert  - [place] [place]other (see comments)  Patient stated that he is agreeable to go [place] [facility] [place]. Declined this date  - [facility] [place] [place]  -     Recommended Precautions and Strategies -- -- upright posture during/after eating;small bites of food and sips of liquid;general aspiration precautions;reflux precautions;assist with feeding  - upright posture during/after eating;small bites of food and sips of liquid;general aspiration precautions;reflux precautions;assist with feeding  -     Oral Care Recommendations Oral Care BID/PRN;Suction toothbrush  - Oral Care BID/PRN;Suction toothbrush  - Oral Care BID/PRN;Suction toothbrush  - Oral Care BID/PRN;Suction toothbrush  -     SLP Rec. for Method of Medication Administration meds via alternate route  -RS meds via alternate route  -RS meds whole;meds crushed;with puree;meds via alternate route  - meds whole;meds crushed;with puree;meds via  alternate route  -CJ     Monitor for Signs of Aspiration notify SLP if any concerns  -RS notify SLP if any concerns  -RS yes;notify SLP if any concerns  -CH yes;notify SLP if any concerns  -CJ     Anticipated Discharge Disposition (SLP) skilled nursing facility  -RS skilled nursing facility  -RS skilled nursing facility  - skilled nursing facility  -               User Key  (r) = Recorded By, (t) = Taken By, (c) = Cosigned By      Initials Name Effective Dates    CJ Osiel Eliz COREA, MS CCC-SLP 06/03/24 - 10/21/24    CH Maya Riddle, MS CCC-SLP 06/16/21 -     RS Darrel Valleh, MS CCC-SLP 09/14/23 -                     EDUCATION  The patient has been educated in the following areas:   Dysphagia (Swallowing Impairment) NPO rationale.        SLP GOALS       Row Name 10/24/24 1130 10/23/24 1320          (LTG) Patient will demonstrate functional swallow for    Diet Texture (Demonstrate functional swallow) soft to chew (ground) textures  -RS soft to chew (ground) textures  -RS     Liquid viscosity (Demonstrate functional swallow) thin liquids  -RS thin liquids  -RS     [place] (Demonstrate functional swallow) with maximum cues (25-49% accuracy)  -RS with maximum cues (25-49% accuracy)  -RS     Time Frame (Demonstrate functional swallow) 1 week  -RS 1 week  -RS     Progress/Outcomes (Demonstrate functional swallow) goal ongoing  -RS --        (STG) Labial Strengthening Goal 1 (SLP)    Activity (Labial Strengthening Goal 1, SLP) increase labial tone  -RS increase labial tone  -RS     Increase Labial Tone labial resistance exercises  -RS labial resistance exercises  -RS     [place]/Accuracy (Labial Strengthening Goal 1, SLP) with maximum cues (25-49% accuracy)  -RS with maximum cues (25-49% accuracy)  -RS     Time Frame (Labial Strengthening Goal 1, SLP) 1 week  -RS 1 week  -RS     Progress/Outcomes (Labial Strengthening Goal 1, SLP) progress slower than expected  -RS --     Comment (Labial Strengthening  Goal 1, SLP) pt made no attempt to complete. Suspect combination of desire, confusion, and lethargy  -RS --        (STG) Lingual Strengthening Goal 1 (SLP)    Activity (Lingual Strengthening Goal 1, SLP) increase tongue back strength  -RS increase tongue back strength  -RS     Increase Tongue Back Strength lingual resistance exercises  -RS lingual resistance exercises  -RS     [place]/Accuracy (Lingual Strengthening Goal 1, SLP) with maximum cues (25-49% accuracy)  -RS with maximum cues (25-49% accuracy)  -RS     Time Frame (Lingual Strengthening Goal 1, SLP) 1 week  -RS 1 week  -RS     Progress/Outcomes (Lingual Strengthening Goal 1, SLP) progress slower than expected  -RS --     Comment (Lingual Strengthening Goal 1, SLP) pt made no attempt to complete. Suspect combination of desire, confusion, and lethargy  -RS --        (STG) Pharyngeal Strengthening Exercise Goal 1 (SLP)    Activity (Pharyngeal Strengthening Goal 1, SLP) increase timing;increase superior movement of the hyolaryngeal complex;increase anterior movement of the hyolaryngeal complex;increase squeeze/positive pressure generation  -RS increase timing;increase superior movement of the hyolaryngeal complex;increase anterior movement of the hyolaryngeal complex;increase squeeze/positive pressure generation  -RS     Increase Timing prepping - 3 second prep or suck swallow or 3-step swallow  -RS prepping - 3 second prep or suck swallow or 3-step swallow  -RS     Increase Superior Movement of the Hyolaryngeal Complex falsetto  -RS falsetto  -RS     Increase Anterior Movement of the Hyolaryngeal Complex chin tuck against resistance (CTAR)  -RS chin tuck against resistance (CTAR)  -RS     Increase Squeeze/Positive Pressure Generation hard effortful swallow  -RS hard effortful swallow  -RS     [place]/Accuracy (Pharyngeal Strengthening Goal 1, SLP) with maximum cues (25-49% accuracy)  -RS with maximum cues (25-49% accuracy)  -RS     Time Frame  (Pharyngeal Strengthening Goal 1, SLP) 1 week  -RS 1 week  -RS     Progress/Outcomes (Pharyngeal Strengthening Goal 1, SLP) progress slower than expected  -RS --     Comment (Pharyngeal Strengthening Goal 1, SLP) pt made no attempt to complete. Suspect combination of desire, confusion, and lethargy  -RS --               User Key  (r) = Recorded By, (t) = Taken By, (c) = Cosigned By      Initials Name Provider Type    Sawyer High MS CCC-SLP Speech and Language Pathologist                         Time Calculation:    Time Calculation- SLP       Row Name 10/24/24 1215             Time Calculation- SLP    SLP Start Time 1130  -RS      SLP Received On 10/24/24  -RS         Untimed Charges    07846-PF Treatment Swallow Minutes 41  -RS         Total Minutes    Untimed Charges Total Minutes 41  -RS       Total Minutes 41  -RS                User Key  (r) = Recorded By, (t) = Taken By, (c) = Cosigned By      Initials Name Provider Type    Sawyer High MS CCC-SLP Speech and Language Pathologist                    Therapy Charges for Today       Code Description Service Date Service Provider Modifiers Qty    [id number] HC ST MOTION FLUORO EVAL SWALLOW 4 10/23/2024 Sawyer Valle MS CCC-SLP GN 1    [id number] HC ST TREATMENT SWALLOW 3 10/24/2024 Sawyer Valle MS CCC-SLP GN 1                 MS JIM Lopez  10/24/2024

## 2024-10-24 NOTE — PLAN OF CARE
Problem: Adult Inpatient Plan of Care  Goal: Plan of Care Review  Outcome: Progressing  Goal: Patient-Specific Goal (Individualized)  Outcome: Progressing  Goal: Absence of Hospital-Acquired Illness or Injury  Outcome: Progressing  Intervention: Identify and Manage Fall Risk  Recent Flowsheet Documentation  Taken 10/24/2024 0400 by Que Petty RN  Safety Promotion/Fall Prevention:   safety round/check completed   activity supervised   assistive device/personal items within reach   clutter free environment maintained   fall prevention program maintained  Taken 10/24/2024 0200 by Que Petty RN  Safety Promotion/Fall Prevention:   activity supervised   assistive device/personal items within reach   safety round/check completed   clutter free environment maintained   nonskid shoes/slippers when out of bed  Taken 10/24/2024 0000 by Que Petty RN  Safety Promotion/Fall Prevention:   safety round/check completed   activity supervised   assistive device/personal items within reach   clutter free environment maintained   fall prevention program maintained  Taken 10/23/2024 2200 by Que Petty RN  Safety Promotion/Fall Prevention:   activity supervised   assistive device/personal items within reach   safety round/check completed   clutter free environment maintained   nonskid shoes/slippers when out of bed  Taken 10/23/2024 2000 by Que Petty RN  Safety Promotion/Fall Prevention:   safety round/check completed   activity supervised   assistive device/personal items within reach   clutter free environment maintained   fall prevention program maintained  Intervention: Prevent Skin Injury  Recent Flowsheet Documentation  Taken 10/24/2024 0400 by Que Petty RN  Body Position:   head facing, left   weight shifting   legs elevated   lower extremity elevated  Skin Protection:   incontinence pads utilized   silicone foam dressing in place   transparent dressing maintained  Taken  10/24/2024 0200 by Que Petty RN  Body Position:   turned   right   weight shifting   lower extremity elevated  Skin Protection:   incontinence pads utilized   transparent dressing maintained  Taken 10/24/2024 0000 by Que Petty RN  Body Position:   left   weight shifting   lower extremity elevated   legs elevated  Skin Protection:   incontinence pads utilized   silicone foam dressing in place   transparent dressing maintained  Taken 10/23/2024 2200 by Que Petty RN  Body Position:   weight shifting   lower extremity elevated   neutral body alignment   legs elevated  Skin Protection:   incontinence pads utilized   transparent dressing maintained  Taken 10/23/2024 2000 by Que Petty RN  Body Position:   weight shifting   tilted   left   neutral body alignment   lower extremity elevated  Skin Protection:   incontinence pads utilized   transparent dressing maintained  Intervention: Prevent Infection  Recent Flowsheet Documentation  Taken 10/24/2024 0400 by Que Petty RN  Infection Prevention:   hand hygiene promoted   rest/sleep promoted  Taken 10/24/2024 0200 by Que Petty RN  Infection Prevention:   environmental surveillance performed   rest/sleep promoted  Taken 10/24/2024 0000 by Que Petty RN  Infection Prevention:   hand hygiene promoted   rest/sleep promoted  Taken 10/23/2024 2200 by Que Petty RN  Infection Prevention:   environmental surveillance performed   rest/sleep promoted  Taken 10/23/2024 2000 by Que Petty RN  Infection Prevention:   cohorting utilized   hand hygiene promoted   rest/sleep promoted  Goal: Optimal Comfort and Wellbeing  Outcome: Progressing  Intervention: Provide Person-Centered Care  Recent Flowsheet Documentation  Taken 10/23/2024 2000 by Que Petty RN  Trust Relationship/Rapport:   care explained   emotional support provided   questions answered   questions encouraged   thoughts/feelings  acknowledged  Goal: Readiness for Transition of Care  Outcome: Progressing     Problem: Skin Injury Risk Increased  Goal: Skin Health and Integrity  Outcome: Progressing  Intervention: Optimize Skin Protection  Recent Flowsheet Documentation  Taken 10/24/2024 0400 by Que Petty RN  Activity Management: activity minimized  Pressure Reduction Techniques:   frequent weight shift encouraged   heels elevated off bed   weight shift assistance provided   positioned off wounds  Head of Bed (HOB) Positioning: HOB at 30 degrees  Pressure Reduction Devices:   positioning supports utilized   pressure-redistributing mattress utilized  Skin Protection:   incontinence pads utilized   silicone foam dressing in place   transparent dressing maintained  Taken 10/24/2024 0200 by Que Petty RN  Activity Management: activity minimized  Pressure Reduction Techniques:   frequent weight shift encouraged   pressure points protected  Head of Bed (HOB) Positioning:   HOB elevated   HOB at 30 degrees  Pressure Reduction Devices:   positioning supports utilized   pressure-redistributing mattress utilized  Skin Protection:   incontinence pads utilized   transparent dressing maintained  Taken 10/24/2024 0000 by Que Petty RN  Activity Management: activity minimized  Pressure Reduction Techniques:   frequent weight shift encouraged   heels elevated off bed   weight shift assistance provided   positioned off wounds  Head of Bed (HOB) Positioning:   HOB elevated   HOB at 30 degrees  Pressure Reduction Devices:   positioning supports utilized   pressure-redistributing mattress utilized  Skin Protection:   incontinence pads utilized   silicone foam dressing in place   transparent dressing maintained  Taken 10/23/2024 2200 by Que Petty RN  Activity Management: activity minimized  Pressure Reduction Techniques:   frequent weight shift encouraged   pressure points protected  Head of Bed (HOB) Positioning: HOB at 30  degrees  Pressure Reduction Devices:   positioning supports utilized   pressure-redistributing mattress utilized  Skin Protection:   incontinence pads utilized   transparent dressing maintained  Taken 10/23/2024 2000 by Que Petty RN  Activity Management: activity encouraged  Pressure Reduction Techniques:   frequent weight shift encouraged   heels elevated off bed   weight shift assistance provided   pressure points protected  Head of Bed (HOB) Positioning: HOB at 30 degrees  Pressure Reduction Devices:   pressure-redistributing mattress utilized   positioning supports utilized  Skin Protection:   incontinence pads utilized   transparent dressing maintained     Problem: Fall Injury Risk  Goal: Absence of Fall and Fall-Related Injury  Outcome: Progressing  Intervention: Identify and Manage Contributors  Recent Flowsheet Documentation  Taken 10/24/2024 0200 by Que Petty RN  Self-Care Promotion: independence encouraged  Taken 10/23/2024 2200 by Que Petty RN  Self-Care Promotion: independence encouraged  Taken 10/23/2024 2000 by Que Petty, RN  Medication Review/Management: medications reviewed  Intervention: Promote Injury-Free Environment  Recent Flowsheet Documentation  Taken 10/24/2024 0400 by Que Petty RN  Safety Promotion/Fall Prevention:   safety round/check completed   activity supervised   assistive device/personal items within reach   clutter free environment maintained   fall prevention program maintained  Taken 10/24/2024 0200 by Que Petty RN  Safety Promotion/Fall Prevention:   activity supervised   assistive device/personal items within reach   safety round/check completed   clutter free environment maintained   nonskid shoes/slippers when out of bed  Taken 10/24/2024 0000 by Que Petty, RN  Safety Promotion/Fall Prevention:   safety round/check completed   activity supervised   assistive device/personal items within reach   clutter free  environment maintained   fall prevention program maintained  Taken 10/23/2024 2200 by Que Petty, RN  Safety Promotion/Fall Prevention:   activity supervised   assistive device/personal items within reach   safety round/check completed   clutter free environment maintained   nonskid shoes/slippers when out of bed  Taken 10/23/2024 2000 by Que Petty, RN  Safety Promotion/Fall Prevention:   safety round/check completed   activity supervised   assistive device/personal items within reach   clutter free environment maintained   fall prevention program maintained   Goal Outcome Evaluation:            Pt has no new nursing issues at this time. Pt is a&ox1, Sinus Bradycardia, 2L NC, BP soft; provider notified BP meds were held.     Pt had an episode of inappropriate/threatening comments to staff.     Pt has been restless and been found sideways in bed during rounds. Strict NPO.     Diabetisource @70ml/hr, q1 hr 15ml flush.  Pt has no complaints at this time. Precautions maintained. Will continue plan of care.

## 2024-10-25 PROBLEM — T68.XXXA HYPOTHERMIA: Status: ACTIVE | Noted: 2024-10-25

## 2024-10-25 LAB
ANION GAP SERPL CALCULATED.3IONS-SCNC: 10 MMOL/L (ref 5–15)
BASOPHILS # BLD AUTO: 0.02 10*3/MM3 (ref 0–0.2)
BASOPHILS # BLD AUTO: 0.02 10*3/MM3 (ref 0–0.2)
BASOPHILS NFR BLD AUTO: 0.2 % (ref 0–1.5)
BASOPHILS NFR BLD AUTO: 0.2 % (ref 0–1.5)
BUN SERPL-MCNC: 34 MG/DL (ref 8–23)
BUN/CREAT SERPL: 36.2 (ref 7–25)
CALCIUM SPEC-SCNC: 8.7 MG/DL (ref 8.6–10.5)
CHLORIDE SERPL-SCNC: 102 MMOL/L (ref 98–107)
CO2 SERPL-SCNC: 28 MMOL/L (ref 22–29)
CREAT SERPL-MCNC: 0.94 MG/DL (ref 0.76–1.27)
D-LACTATE SERPL-SCNC: 1 MMOL/L (ref 0.5–2)
DEPRECATED RDW RBC AUTO: 49.1 FL (ref 37–54)
DEPRECATED RDW RBC AUTO: 49.2 FL (ref 37–54)
EGFRCR SERPLBLD CKD-EPI 2021: 88.9 ML/MIN/1.73
EOSINOPHIL # BLD AUTO: 0.12 10*3/MM3 (ref 0–0.4)
EOSINOPHIL # BLD AUTO: 0.18 10*3/MM3 (ref 0–0.4)
EOSINOPHIL NFR BLD AUTO: 1.3 % (ref 0.3–6.2)
EOSINOPHIL NFR BLD AUTO: 1.7 % (ref 0.3–6.2)
ERYTHROCYTE [DISTWIDTH] IN BLOOD BY AUTOMATED COUNT: 14.5 % (ref 12.3–15.4)
ERYTHROCYTE [DISTWIDTH] IN BLOOD BY AUTOMATED COUNT: 14.6 % (ref 12.3–15.4)
GLUCOSE BLDC GLUCOMTR-MCNC: 131 MG/DL (ref 70–130)
GLUCOSE BLDC GLUCOMTR-MCNC: 139 MG/DL (ref 70–130)
GLUCOSE BLDC GLUCOMTR-MCNC: 207 MG/DL (ref 70–130)
GLUCOSE BLDC GLUCOMTR-MCNC: 209 MG/DL (ref 70–130)
GLUCOSE BLDC GLUCOMTR-MCNC: 226 MG/DL (ref 70–130)
GLUCOSE SERPL-MCNC: 171 MG/DL (ref 65–99)
HCT VFR BLD AUTO: 23.8 % (ref 37.5–51)
HCT VFR BLD AUTO: 24.4 % (ref 37.5–51)
HGB BLD-MCNC: 7.8 G/DL (ref 13–17.7)
HGB BLD-MCNC: 8.1 G/DL (ref 13–17.7)
IMM GRANULOCYTES # BLD AUTO: 0.07 10*3/MM3 (ref 0–0.05)
IMM GRANULOCYTES # BLD AUTO: 0.09 10*3/MM3 (ref 0–0.05)
IMM GRANULOCYTES NFR BLD AUTO: 0.8 % (ref 0–0.5)
IMM GRANULOCYTES NFR BLD AUTO: 0.9 % (ref 0–0.5)
LYMPHOCYTES # BLD AUTO: 1.06 10*3/MM3 (ref 0.7–3.1)
LYMPHOCYTES # BLD AUTO: 1.35 10*3/MM3 (ref 0.7–3.1)
LYMPHOCYTES NFR BLD AUTO: 11.6 % (ref 19.6–45.3)
LYMPHOCYTES NFR BLD AUTO: 13.1 % (ref 19.6–45.3)
MCH RBC QN AUTO: 30.2 PG (ref 26.6–33)
MCH RBC QN AUTO: 30.9 PG (ref 26.6–33)
MCHC RBC AUTO-ENTMCNC: 32.8 G/DL (ref 31.5–35.7)
MCHC RBC AUTO-ENTMCNC: 33.2 G/DL (ref 31.5–35.7)
MCV RBC AUTO: 92.2 FL (ref 79–97)
MCV RBC AUTO: 93.1 FL (ref 79–97)
MONOCYTES # BLD AUTO: 0.59 10*3/MM3 (ref 0.1–0.9)
MONOCYTES # BLD AUTO: 0.75 10*3/MM3 (ref 0.1–0.9)
MONOCYTES NFR BLD AUTO: 6.5 % (ref 5–12)
MONOCYTES NFR BLD AUTO: 7.3 % (ref 5–12)
NEUTROPHILS NFR BLD AUTO: 7.26 10*3/MM3 (ref 1.7–7)
NEUTROPHILS NFR BLD AUTO: 7.95 10*3/MM3 (ref 1.7–7)
NEUTROPHILS NFR BLD AUTO: 76.8 % (ref 42.7–76)
NEUTROPHILS NFR BLD AUTO: 79.6 % (ref 42.7–76)
NRBC BLD AUTO-RTO: 0 /100 WBC (ref 0–0.2)
NRBC BLD AUTO-RTO: 0 /100 WBC (ref 0–0.2)
PLATELET # BLD AUTO: 178 10*3/MM3 (ref 140–450)
PLATELET # BLD AUTO: 196 10*3/MM3 (ref 140–450)
PMV BLD AUTO: 10.9 FL (ref 6–12)
PMV BLD AUTO: 11.4 FL (ref 6–12)
POTASSIUM SERPL-SCNC: 4.4 MMOL/L (ref 3.5–5.2)
RBC # BLD AUTO: 2.58 10*6/MM3 (ref 4.14–5.8)
RBC # BLD AUTO: 2.62 10*6/MM3 (ref 4.14–5.8)
SODIUM SERPL-SCNC: 140 MMOL/L (ref 136–145)
WBC NRBC COR # BLD AUTO: 10.34 10*3/MM3 (ref 3.4–10.8)
WBC NRBC COR # BLD AUTO: 9.12 10*3/MM3 (ref 3.4–10.8)

## 2024-10-25 PROCEDURE — 85025 COMPLETE CBC W/AUTO DIFF WBC: CPT | Performed by: STUDENT IN AN ORGANIZED HEALTH CARE EDUCATION/TRAINING PROGRAM

## 2024-10-25 PROCEDURE — 94799 UNLISTED PULMONARY SVC/PX: CPT

## 2024-10-25 PROCEDURE — 63710000001 INSULIN LISPRO (HUMAN) PER 5 UNITS: Performed by: HOSPITALIST

## 2024-10-25 PROCEDURE — 25010000002 ENOXAPARIN PER 10 MG: Performed by: PEDIATRICS

## 2024-10-25 PROCEDURE — 80048 BASIC METABOLIC PNL TOTAL CA: CPT | Performed by: STUDENT IN AN ORGANIZED HEALTH CARE EDUCATION/TRAINING PROGRAM

## 2024-10-25 PROCEDURE — 99232 SBSQ HOSP IP/OBS MODERATE 35: CPT | Performed by: STUDENT IN AN ORGANIZED HEALTH CARE EDUCATION/TRAINING PROGRAM

## 2024-10-25 PROCEDURE — 83605 ASSAY OF LACTIC ACID: CPT | Performed by: PHYSICIAN ASSISTANT

## 2024-10-25 PROCEDURE — 92526 ORAL FUNCTION THERAPY: CPT

## 2024-10-25 PROCEDURE — 87040 BLOOD CULTURE FOR BACTERIA: CPT | Performed by: PHYSICIAN ASSISTANT

## 2024-10-25 PROCEDURE — 82948 REAGENT STRIP/BLOOD GLUCOSE: CPT

## 2024-10-25 RX ADMIN — INSULIN LISPRO 3 UNITS: 100 INJECTION, SOLUTION INTRAVENOUS; SUBCUTANEOUS at 09:07

## 2024-10-25 RX ADMIN — BRIMONIDINE TARTRATE 1 DROP: 2 SOLUTION/ DROPS OPHTHALMIC at 16:12

## 2024-10-25 RX ADMIN — Medication 10 ML: at 21:52

## 2024-10-25 RX ADMIN — ENOXAPARIN SODIUM 40 MG: 100 INJECTION SUBCUTANEOUS at 09:07

## 2024-10-25 RX ADMIN — VALPROIC ACID 150 MG: 250 SOLUTION ORAL at 21:51

## 2024-10-25 RX ADMIN — ALBUTEROL SULFATE 2.5 MG: 2.5 SOLUTION RESPIRATORY (INHALATION) at 19:16

## 2024-10-25 RX ADMIN — BRIMONIDINE TARTRATE 1 DROP: 2 SOLUTION/ DROPS OPHTHALMIC at 23:49

## 2024-10-25 RX ADMIN — TIMOLOL MALEATE 1 DROP: 5 SOLUTION/ DROPS OPHTHALMIC at 23:49

## 2024-10-25 RX ADMIN — CLONIDINE HYDROCHLORIDE 0.2 MG: 0.1 TABLET ORAL at 14:29

## 2024-10-25 RX ADMIN — MINERAL OIL 5 ML: 1000 LIQUID ORAL at 18:10

## 2024-10-25 RX ADMIN — TERAZOSIN HYDROCHLORIDE 5 MG: 5 CAPSULE ORAL at 21:51

## 2024-10-25 RX ADMIN — TIMOLOL MALEATE 1 DROP: 5 SOLUTION/ DROPS OPHTHALMIC at 09:09

## 2024-10-25 RX ADMIN — MINERAL OIL 5 ML: 1000 LIQUID ORAL at 12:06

## 2024-10-25 RX ADMIN — CLONIDINE HYDROCHLORIDE 0.2 MG: 0.1 TABLET ORAL at 05:43

## 2024-10-25 RX ADMIN — HYDRALAZINE HYDROCHLORIDE 100 MG: 50 TABLET ORAL at 05:43

## 2024-10-25 RX ADMIN — INSULIN LISPRO 3 UNITS: 100 INJECTION, SOLUTION INTRAVENOUS; SUBCUTANEOUS at 23:55

## 2024-10-25 RX ADMIN — AMOXICILLIN 875 MG: 875 TABLET, FILM COATED ORAL at 21:52

## 2024-10-25 RX ADMIN — AMLODIPINE BESYLATE 10 MG: 10 TABLET ORAL at 09:07

## 2024-10-25 RX ADMIN — TERAZOSIN HYDROCHLORIDE 5 MG: 5 CAPSULE ORAL at 09:08

## 2024-10-25 RX ADMIN — Medication 10 ML: at 09:09

## 2024-10-25 RX ADMIN — VALPROIC ACID 150 MG: 250 SOLUTION ORAL at 14:33

## 2024-10-25 RX ADMIN — FLUOXETINE HYDROCHLORIDE 20 MG: 20 SOLUTION ORAL at 09:08

## 2024-10-25 RX ADMIN — HYDRALAZINE HYDROCHLORIDE 100 MG: 50 TABLET ORAL at 14:29

## 2024-10-25 RX ADMIN — ARIPIPRAZOLE 5 MG: 5 TABLET ORAL at 09:08

## 2024-10-25 RX ADMIN — CARVEDILOL 12.5 MG: 6.25 TABLET, FILM COATED ORAL at 09:08

## 2024-10-25 RX ADMIN — VALPROIC ACID 150 MG: 250 SOLUTION ORAL at 05:42

## 2024-10-25 RX ADMIN — QUETIAPINE FUMARATE 25 MG: 25 TABLET ORAL at 21:52

## 2024-10-25 RX ADMIN — CARVEDILOL 12.5 MG: 6.25 TABLET, FILM COATED ORAL at 21:51

## 2024-10-25 RX ADMIN — ALBUTEROL SULFATE 2.5 MG: 2.5 SOLUTION RESPIRATORY (INHALATION) at 07:20

## 2024-10-25 RX ADMIN — AMOXICILLIN 875 MG: 875 TABLET, FILM COATED ORAL at 09:08

## 2024-10-25 RX ADMIN — LANSOPRAZOLE 30 MG: 15 TABLET, ORALLY DISINTEGRATING ORAL at 05:43

## 2024-10-25 RX ADMIN — INSULIN LISPRO 3 UNITS: 100 INJECTION, SOLUTION INTRAVENOUS; SUBCUTANEOUS at 18:10

## 2024-10-25 RX ADMIN — BRIMONIDINE TARTRATE 1 DROP: 2 SOLUTION/ DROPS OPHTHALMIC at 09:09

## 2024-10-25 NOTE — PLAN OF CARE
Goal Outcome Evaluation:  Plan of Care Reviewed With: patient        Progress: no change       Anticipated Discharge Disposition (SLP): skilled nursing facility             Treatment Assessment (SLP): continued, oral dysphagia, pharyngeal dysphagia, suspected (10/25/24 1500)  Treatment Assessment Comments (SLP): Lethargy and cognitive status appeared to negatively affect swallow safety. (10/25/24 1500)  Plan for Continued Treatment (SLP): continue treatment per plan of care (consider repeat instrumental swallow study when more alert and clinically appropriate) (10/25/24 1500)

## 2024-10-25 NOTE — THERAPY TREATMENT NOTE
Acute Care - Speech Language Pathology   Swallow Treatment Note [facility]     Patient Name: Omkar Nava  : 1955  MRN: [id number]  Today's Date: 10/25/2024               Admit Date: 10/20/2024    Visit Dx:     ICD-10-CM ICD-9-CM   1. Acute cystitis without hematuria  N30.00 595.0   2. Pneumonia due to infectious organism, unspecified laterality, unspecified part of lung  J18.9 486   3. 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, without long-term current use of insulin    Acute osteomyelitis of left foot    Type 2 diabetes mellitus    Essential hypertension    Psychotic disorder    Neurocognitive disorder    S/P transmetatarsal amputation of foot, left    Abscess of left foot    Anemia of chronic disease    Aspiration pneumonia    Cervical spine pain    Chronic kidney disease    Closed head injury without loss of consciousness    Dementia    Dental cavities    Diarrhea of presumed infectious origin    Displaced fracture of proximal phalanx of left great toe, initial encounter for open fracture    Dysphagia    Erectile dysfunction    Generalized muscle weakness    Hallucinations    Hypertensive heart and chronic kidney disease 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 failure, with stage 1 through stage 4 chronic kidney disease, or unspecified chronic kidney disease    Insomnia due to medical condition    Iron deficiency anemia    Laceration without foreign body, left foot, subsequent encounter    Long term (current) use of insulin    Personal history of Methicillin resistant Staphylococcus aureus infection    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    Protein calorie malnutrition    Simple chronic bronchitis    Tobacco use    Type 2 diabetes mellitus with diabetic neuropathy, unspecified    Type 2 diabetes mellitus with diabetic chronic kidney disease    Acute type 1  respiratory failure    Severe vascular dementia without behavioral disturbance, psychotic disturbance, mood disturbance, or anxiety    UTI (urinary tract infection)    Vomiting    Acute encephalopathy     Past Medical History:   Diagnosis Date    Anemia     Dementia     Diabetes mellitus     Dysphagia     GERD (gastroesophageal reflux disease)     History of alcohol abuse     History of cocaine use     History of marijuana use     Hypertension     Osteomyelitis     Poor historian     records obtained from nursing home records & his family    Visual impairment      Past Surgical History:   Procedure Laterality Date    AMPUTATION FOOT Left 10/18/2022    Procedure: PARTIAL FIRST RAY AMPUTATION LEFT;  Surgeon: Yeison Petty MD;  Location:  [facility];  Service: Orthopedics;  Laterality: Left;    AMPUTATION FOOT Left 12/5/2022    Procedure: Transmetatarsal of Left Foot;  Surgeon: Yeison Petty MD;  Location:  [facility];  Service: Orthopedics;  Laterality: Left;    ENDOSCOPY N/A 3/14/2024    Procedure: ESOPHAGOGASTRODUODENOSCOPY WITH JEJUNAL TUBE INSERTION AT BEDSIDE;  Surgeon: Brunner, Mark I, MD;  Location:  [facility];  Service: Gastroenterology;  Laterality: N/A;    ENDOSCOPY W/ PEG TUBE PLACEMENT N/A 4/1/2024    Procedure: ESOPHAGOGASTRODUODENOSCOPY WITH PERCUTANEOUS ENDOSCOPIC GASTROSTOMY TUBE INSERTION;  Surgeon: Brunner, Mark I, MD;  Location:  [facility];  Service: Gastroenterology;  Laterality: N/A;  EGD with PEG placement.  Secured at 3.5 cm    EYE SURGERY         SLP Recommendation and Plan     SLP Diet Recommendation: NPO, long term alternate methods of nutrition/hydration (10/25/24 1500)                    Anticipated Discharge Disposition (SLP): skilled nursing facility (10/25/24 1500)                    Daily Summary of Progress (SLP): progress toward functional goals is gradual (10/25/24 1500)               Treatment Assessment (SLP): continued, oral dysphagia, pharyngeal dysphagia, suspected  (10/25/24 1500)  Treatment Assessment Comments (SLP): Lethargy and cognitive status appeared to negatively affect swallow safety. (10/25/24 1500)  Plan for Continued Treatment (SLP): continue treatment per plan of care (consider repeat instrumental swallow study when more alert and clinically appropriate) (10/25/24 1500)         Progress: no change      SWALLOW EVALUATION (Last 72 Hours)       SLP Adult Swallow Evaluation       Row Name 10/25/24 1500 10/24/24 1130 10/23/24 1320             Rehab Evaluation    Document Type therapy note (daily note)  -AC therapy note (daily note)  -RS evaluation  -RS      Subjective Information no complaints  -AC complains of  -RS no complaints  -RS      Patient Observations lethargic;cooperative  -AC lethargic;poorly cooperative  -RS lethargic;poorly cooperative  -RS      Patient/Family/Caregiver Comments/Observations No family present.  -AC none present  -RS none present in radiology  -RS      Patient Effort fair  -AC poor  -RS fair  -RS      Symptoms Noted During/After Treatment -- none  -RS none  -RS      Oral Care -- patient refused intervention  -RS --         Pain    Additional Documentation -- Pain Scale: FACES Pre/Post-Treatment (Group)  -RS Pain Scale: FACES Pre/Post-Treatment (Group)  -RS         Pain Scale: FACES Pre/Post-Treatment    Pain: FACES Scale, Pretreatment 0-->no hurt  -AC 0-->no hurt  -RS 0-->no hurt  -RS      Posttreatment Pain Rating 0-->no hurt  -AC 0-->no hurt  -RS 0-->no hurt  -RS         MBS/VFSS    Utensils Used -- -- spoon  -RS      Consistencies Trialed -- -- pureed;thin liquids;nectar/syrup-thick liquids  -RS         MBS/VFSS Interpretation    Oral Prep Phase -- -- impaired oral phase of swallowing  -RS      Oral Transit Phase -- -- impaired  -RS      Oral Residue -- -- impaired  -RS         Oral Preparatory Phase    Oral Preparatory Phase -- -- reduced lip opening;anterior loss;oral holding;prolonged manipulation;inadequate manipulation;bolus removed  from mouth manually  -RS      Reduced Lip Opening -- -- all consistencies tested  -RS      Anterior Loss -- -- thin liquids;secondary to reduced labial seal;secondary to impaired cognitive status  -RS      Oral Holding -- -- all consistencies tested;secondary to impaired cognitive status  -RS      Prolonged Manipulation -- -- pudding/puree;secondary to impaired cognitive status  -RS      Inadequate Manipulation -- -- pudding/puree;secondary to impaired cognitive status  -RS      Bolus Removed from Mouth Manually -- -- pudding/puree;secondary to impaired cognitive status  -RS         Oral Transit Phase    Impaired Oral Transit Phase -- -- premature spillage of liquids into pharynx  -RS      Premature Spillage of Liquids into Pharynx -- -- thin liquids;nectar-thick liquids;secondary to reduced lingual control;secondary to impaired cognitive status  -RS         Oral Residue    Impaired Oral Residue -- -- diffuse residue throughout oral cavity  -RS      Diffuse Residue throughout Oral Cavity -- -- pudding/puree;secondary to reduced lingual strength;secondary to reduced lingual range of motion  -RS      Response to Oral Residue -- -- unable to clear residue;other (see comments)  oral cavity cleared by SLP  -RS         Initiation of Pharyngeal Swallow    Initiation of Pharyngeal Swallow -- -- bolus in pyriform sinuses  -RS      Pharyngeal Phase -- -- impaired pharyngeal phase of swallowing;unable to assess;unable/unwilling to cooperate  -RS      Penetration Before the Swallow -- -- thin liquids;secondary to reduced back of tongue control;secondary to delayed swallow initiation or mistiming  -RS      Aspiration During the Swallow -- -- thin liquids;secondary to delayed swallow initiation or mistiming;secondary to reduced laryngeal elevation;other (see comments)  aspiration during the swallow of penetrated material 2/2  -RS      Response to Penetration -- -- No  -RS      No spontaneous response to penetration and -- --  could not produce cough response despite cue  -RS      Response to Aspiration -- -- No  -RS      No spontaneous response to aspiration and -- -- could not produce cough response despite cue  -RS      Rosenbek's Scale -- -- thin:;8--->level 8  -RS      Pharyngeal Residue -- -- nectar-thick liquids;diffuse within pharynx;secondary to reduced base of tongue retraction;secondary to reduced posterior pharyngeal wall stripping;secondary to reduced laryngeal elevation;secondary to reduced hyolaryngeal excursion  -RS      Response to Residue -- -- unable to clear residue  -RS      Attempted Compensatory Maneuvers -- -- other (see comments)  pt u/a to maintain appropriate alertness for continuation of study and therefore no maneuvers were attempted or considered  -RS      Pharyngeal Phase, Comment -- -- Level of lethargy precludes safe PO intake. Secure chat b/w RN and MD, u/a to rec safe PO at this time. Resume nutritional support via PEG. SLP will cont to follow and consider PO when pt consistently awake and alert  -RS         SLP Evaluation Clinical Impression    SLP Swallowing Diagnosis -- -- mod-severe;oral dysphagia;suspected pharyngeal dysphagia  -RS      Functional Impact -- -- risk of aspiration/pneumonia;risk of malnutrition  -RS      Rehab Potential/Prognosis, Swallowing -- -- re-evaluate goals as necessary  -RS      Swallow Criteria for Skilled Therapeutic Interventions Met -- -- demonstrates skilled criteria  -RS         SLP Treatment Clinical Impressions    Treatment Assessment (SLP) continued;oral dysphagia;pharyngeal dysphagia;suspected  -AC suspected;continued;oral dysphagia;pharyngeal dysphagia  -RS --      Treatment Assessment Comments (SLP) Lethargy and cognitive status appeared to negatively affect swallow safety.  -AC Hospitalist requested re-assessment today. Pt was agitated and combative w staff earlier, but is lethargic during SLP session. Unfortunately, pt accepts only a single trial of ntl water at  bedside before he begins cursing at me and refuses any further PO attempts. He makes no attempt to complete any oral or pharyngeal exercise,suspect combination of desire, confusion, and lethargy. Until pt is more consistently awake, alert, cooperative, and accepting more PO trials at bedside, SLP is u/a to recommend instrumental or safe PO. Will continue to follow.  -RS --      Daily Summary of Progress (SLP) progress toward functional goals is gradual  -AC progress toward functional goals is gradual  -RS --      Barriers to Overall Progress (SLP) Lethargy;Cognitive status;Baseline deficits  -AC Lethargy;Cognitive status;Resistant to information  -RS --      Plan for Continued Treatment (SLP) continue treatment per plan of care  consider repeat instrumental swallow study when more alert and clinically appropriate  -AC continue treatment per plan of care  -RS --      Care Plan Review evaluation/treatment results reviewed;care plan/treatment goals reviewed;risks/benefits reviewed;current/potential barriers reviewed;patient/other agree to care plan  -AC care plan/treatment goals reviewed  -RS --         Recommendations    Therapy Frequency (Swallow) -- 5 days per week  -RS --      Predicted Duration Therapy Intervention (Days) -- 2 weeks  -RS --      SLP Diet Recommendation NPO;long term alternate methods of nutrition/hydration  -AC NPO;long term alternate methods of nutrition/hydration  -RS NPO;long term alternate methods of nutrition/hydration  -RS      Recommended Diagnostics -- -- reassess via clinical swallow evaluation;other (see comments)  when consistently awake and alert  -RS      Oral Care Recommendations -- Oral Care BID/PRN;Suction toothbrush  -RS Oral Care BID/PRN;Suction toothbrush  -RS      SLP Rec. for Method of Medication Administration -- meds via alternate route  -RS meds via alternate route  -RS      Monitor for Signs of Aspiration -- notify SLP if any concerns  -RS notify SLP if any concerns  -RS       Anticipated Discharge Disposition (SLP) skilled nursing facility  -AC skilled nursing facility  -RS skilled nursing facility  -RS                User Key  (r) = Recorded By, (t) = Taken By, (c) = Cosigned By      Initials Name Effective Dates    AC Kimberlee Castillo, MS CCC-SLP 02/03/23 -     RS LeonardSawyer MS CCC-SLP 09/14/23 -                     EDUCATION  The patient has been educated in the following areas:   Dysphagia (Swallowing Impairment) NPO rationale.        SLP GOALS       Row Name 10/25/24 1500 10/24/24 1130 10/23/24 1320       (LTG) Patient will demonstrate functional swallow for    Diet Texture (Demonstrate functional swallow) soft to chew (ground) textures  -AC soft to chew (ground) textures  -RS soft to chew (ground) textures  -RS    Liquid viscosity (Demonstrate functional swallow) thin liquids  -AC thin liquids  -RS thin liquids  -RS    [place] (Demonstrate functional swallow) with maximum cues (25-49% accuracy)  -AC with maximum cues (25-49% accuracy)  -RS with maximum cues (25-49% accuracy)  -RS    Time Frame (Demonstrate functional swallow) 1 week  -AC 1 week  -RS 1 week  -RS    Progress/Outcomes (Demonstrate functional swallow) continuing progress toward goal  - goal ongoing  -RS --    Comment (Demonstrate functional swallow) Trialed ice, thin via tsp, nectar via tsp/cup, puree. Accepted PO trials, but required coaching to close mouth around utensil and to initiate swallow. Hyperextended neck position. Multiple seemingly weak swallows per laryngeal palpation. Appeared to fatigue following a few trials and was difficult to rouse. Recent silent aspiration per MBS. DNT further PO.  - -- --       (STG) Labial Strengthening Goal 1 (SLP)    Activity (Labial Strengthening Goal 1, SLP) -- increase labial tone  -RS increase labial tone  -RS    Increase Labial Tone labial resistance exercises  -AC labial resistance exercises  -RS labial resistance exercises  -RS    [place]/Accuracy  (Labial Strengthening Goal 1, SLP) with maximum cues (25-49% accuracy)  -AC with maximum cues (25-49% accuracy)  -RS with maximum cues (25-49% accuracy)  -RS    Time Frame (Labial Strengthening Goal 1, SLP) 1 week  -AC 1 week  -RS 1 week  -RS    Progress/Outcomes (Labial Strengthening Goal 1, SLP) progress slower than expected  -AC progress slower than expected  -RS --    Comment (Labial Strengthening Goal 1, SLP) 0% w/ max cues  -AC pt made no attempt to complete. Suspect combination of desire, confusion, and lethargy  -RS --       (STG) Lingual Strengthening Goal 1 (SLP)    Activity (Lingual Strengthening Goal 1, SLP) -- increase tongue back strength  -RS increase tongue back strength  -RS    Increase Tongue Back Strength lingual resistance exercises  -AC lingual resistance exercises  -RS lingual resistance exercises  -RS    [place]/Accuracy (Lingual Strengthening Goal 1, SLP) with maximum cues (25-49% accuracy)  -AC with maximum cues (25-49% accuracy)  -RS with maximum cues (25-49% accuracy)  -RS    Time Frame (Lingual Strengthening Goal 1, SLP) 1 week  -AC 1 week  -RS 1 week  -RS    Progress/Outcomes (Lingual Strengthening Goal 1, SLP) progress slower than expected  -AC progress slower than expected  -RS --    Comment (Lingual Strengthening Goal 1, SLP) 0% w/ max cues  -AC pt made no attempt to complete. Suspect combination of desire, confusion, and lethargy  -RS --       (STG) Pharyngeal Strengthening Exercise Goal 1 (SLP)    Activity (Pharyngeal Strengthening Goal 1, SLP) -- increase timing;increase superior movement of the hyolaryngeal complex;increase anterior movement of the hyolaryngeal complex;increase squeeze/positive pressure generation  -RS increase timing;increase superior movement of the hyolaryngeal complex;increase anterior movement of the hyolaryngeal complex;increase squeeze/positive pressure generation  -RS    Increase Timing prepping - 3 second prep or suck swallow or 3-step swallow  -AC  prepping - 3 second prep or suck swallow or 3-step swallow  -RS prepping - 3 second prep or suck swallow or 3-step swallow  -RS    Increase Superior Movement of the Hyolaryngeal Complex falsetto  -AC falsetto  -RS falsetto  -RS    Increase Anterior Movement of the Hyolaryngeal Complex chin tuck against resistance (CTAR)  -AC chin tuck against resistance (CTAR)  -RS chin tuck against resistance (CTAR)  -RS    Increase Squeeze/Positive Pressure Generation hard effortful swallow  -AC hard effortful swallow  -RS hard effortful swallow  -RS    [place]/Accuracy (Pharyngeal Strengthening Goal 1, SLP) with maximum cues (25-49% accuracy)  -AC with maximum cues (25-49% accuracy)  -RS with maximum cues (25-49% accuracy)  -RS    Time Frame (Pharyngeal Strengthening Goal 1, SLP) 1 week  -AC 1 week  -RS 1 week  -RS    Progress/Outcomes (Pharyngeal Strengthening Goal 1, SLP) goal ongoing  -AC progress slower than expected  -RS --    Comment (Pharyngeal Strengthening Goal 1, SLP) Did not attempt 2' difficulty following basic commands, lethargy.  -AC pt made no attempt to complete. Suspect combination of desire, confusion, and lethargy  -RS --              User Key  (r) = Recorded By, (t) = Taken By, (c) = Cosigned By      Initials Name Provider Type    Kimberlee Pastor MS CCC-SLP Speech and Language Pathologist    Sawyer High MS CCC-SLP Speech and Language Pathologist                         Time Calculation:    Time Calculation- SLP       Row Name 10/25/24 1533             Time Calculation- SLP    SLP Start Time 1500  -AC      SLP Received On 10/25/24  -AC         Untimed Charges    [id number] Treatment Swallow Minutes 37  -AC         Total Minutes    Untimed Charges Total Minutes 37  -AC       Total Minutes 37  -AC                User Key  (r) = Recorded By, (t) = Taken By, (c) = Cosigned By      Initials Name Provider Type    Kimberlee Pastor MS CCC-SLP Speech and Language Pathologist                    Therapy  Charges for Today       Code Description Service Date Service Provider Modifiers Qty    [id number]  ST TREATMENT SWALLOW 2 10/25/2024 Kimberlee Castillo, MS CCC-SLP GN 1                 Kimberlee Castillo, MS CCC-SLP  10/25/2024

## 2024-10-25 NOTE — CASE MANAGEMENT/SOCIAL WORK
Discharge Planning Assessment  [facility]     Patient Name: Omkar Nava  MRN: [id number]  Today's Date: 10/25/2024    Admit Date: 10/20/2024    Plan: [facility]   Discharge Needs Assessment    No documentation.                  Discharge Plan       Row Name 10/25/24 1242       Plan    Plan [facility]    Patient/Family in Agreement with Plan yes    Plan Comments Spoke with Mr. Nava's Daughter Idalia by telephone. Awaiting medical readiness for him to return to his LTC bed at [facility]. CM will continue to follow up.    Final Discharge Disposition Code 04 - intermediate care facility                  Continued Care and Services - Admitted Since 10/20/2024    No active coordination exists for this encounter.       Selected Continued Care - Prior Encounters Includes continued care and service providers with selected services from prior encounters from 7/22/2024 to 10/25/2024      Discharged on 9/18/2024 Admission date: 9/11/2024 - Discharge disposition: Skilled Nursing Facility (DC - External)      Destination       Service Provider Services Address Phone Fax Patient Preferred    [facility] [address] [facility] [address] [phone] --                                    Jaki Felton, RN

## 2024-10-25 NOTE — PLAN OF CARE
Problem: Adult Inpatient Plan of Care  Goal: Plan of Care Review  Outcome: Progressing  Goal: Patient-Specific Goal (Individualized)  Outcome: Progressing  Goal: Absence of Hospital-Acquired Illness or Injury  Outcome: Progressing  Intervention: Identify and Manage Fall Risk  Recent Flowsheet Documentation  Taken 10/25/2024 0428 by Celeste Salinas RN  Safety Promotion/Fall Prevention:   activity supervised   assistive device/personal items within reach   clutter free environment maintained   fall prevention program maintained   lighting adjusted   nonskid shoes/slippers when out of bed   room organization consistent   safety round/check completed  Taken 10/25/2024 0220 by Celeste Salinas RN  Safety Promotion/Fall Prevention:   activity supervised   assistive device/personal items within reach   clutter free environment maintained   fall prevention program maintained   lighting adjusted   nonskid shoes/slippers when out of bed   room organization consistent   safety round/check completed  Taken 10/25/2024 0000 by Celeste Salinas RN  Safety Promotion/Fall Prevention:   activity supervised   assistive device/personal items within reach   clutter free environment maintained   fall prevention program maintained   lighting adjusted   nonskid shoes/slippers when out of bed   room organization consistent   safety round/check completed  Taken 10/24/2024 2200 by Celeste Salinas RN  Safety Promotion/Fall Prevention:   activity supervised   assistive device/personal items within reach   clutter free environment maintained   fall prevention program maintained   lighting adjusted   nonskid shoes/slippers when out of bed   room organization consistent   safety round/check completed  Taken 10/24/2024 2000 by Celeste Salinas RN  Safety Promotion/Fall Prevention:   activity supervised   assistive device/personal items within reach   clutter free environment maintained   fall prevention program  maintained   lighting adjusted   nonskid shoes/slippers when out of bed   room organization consistent   safety round/check completed  Intervention: Prevent Skin Injury  Recent Flowsheet Documentation  Taken 10/25/2024 0428 by Celeste Salinas RN  Body Position: right  Skin Protection:   incontinence pads utilized   skin sealant/moisture barrier applied   transparent dressing maintained  Taken 10/25/2024 0220 by Celeste Salinas RN  Body Position:   turned   left  Skin Protection:   incontinence pads utilized   transparent dressing maintained  Taken 10/25/2024 0000 by Celeste Salinas RN  Skin Protection:   incontinence pads utilized   skin sealant/moisture barrier applied   transparent dressing maintained  Taken 10/24/2024 2200 by Celeste Salinas RN  Body Position: left  Skin Protection:   incontinence pads utilized   skin sealant/moisture barrier applied   transparent dressing maintained  Taken 10/24/2024 2000 by Celeste Salinas RN  Body Position: right  Skin Protection:   incontinence pads utilized   skin sealant/moisture barrier applied   transparent dressing maintained  Intervention: Prevent and Manage VTE (Venous Thromboembolism) Risk  Recent Flowsheet Documentation  Taken 10/24/2024 2000 by Celeste Salinas RN  VTE Prevention/Management: (see MAR)   bilateral   SCDs (sequential compression devices) off   other (see comments)  Intervention: Prevent Infection  Recent Flowsheet Documentation  Taken 10/25/2024 0428 by Celeste Salinas RN  Infection Prevention:   environmental surveillance performed   hand hygiene promoted   single patient room provided  Taken 10/25/2024 0220 by Celeste Salinas RN  Infection Prevention:   environmental surveillance performed   hand hygiene promoted   single patient room provided  Taken 10/25/2024 0000 by Celeste Salinas RN  Infection Prevention:   environmental surveillance performed   hand hygiene promoted   single patient room  provided  Taken 10/24/2024 2200 by Celeste Salinas RN  Infection Prevention:   environmental surveillance performed   hand hygiene promoted   single patient room provided  Taken 10/24/2024 2000 by Celeste Salinas RN  Infection Prevention:   environmental surveillance performed   hand hygiene promoted   single patient room provided  Goal: Optimal Comfort and Wellbeing  Outcome: Progressing  Intervention: Provide Person-Centered Care  Recent Flowsheet Documentation  Taken 10/24/2024 2000 by Celeste Salinas RN  Trust Relationship/Rapport:   care explained   choices provided   questions answered   questions encouraged   thoughts/feelings acknowledged  Goal: Readiness for Transition of Care  Outcome: Progressing     Problem: Skin Injury Risk Increased  Goal: Skin Health and Integrity  Outcome: Progressing  Intervention: Optimize Skin Protection  Recent Flowsheet Documentation  Taken 10/25/2024 0428 by Celeste Salinas RN  Activity Management: activity encouraged  Pressure Reduction Techniques:   frequent weight shift encouraged   pressure points protected   weight shift assistance provided  Head of Bed (HOB) Positioning: HOB elevated  Pressure Reduction Devices:   heel offloading device utilized   positioning supports utilized   pressure-redistributing mattress utilized  Skin Protection:   incontinence pads utilized   skin sealant/moisture barrier applied   transparent dressing maintained  Taken 10/25/2024 0220 by Celeste Salinas RN  Activity Management: activity encouraged  Pressure Reduction Techniques:   frequent weight shift encouraged   pressure points protected   weight shift assistance provided  Head of Bed (HOB) Positioning: HOB elevated  Pressure Reduction Devices:   heel offloading device utilized   positioning supports utilized   pressure-redistributing mattress utilized   specialty bed utilized  Skin Protection:   incontinence pads utilized   transparent dressing maintained  Taken  10/25/2024 0000 by Celeste Salinas RN  Activity Management: activity encouraged  Pressure Reduction Techniques:   frequent weight shift encouraged   pressure points protected   weight shift assistance provided  Head of Bed (HOB) Positioning: HOB elevated  Pressure Reduction Devices:   positioning supports utilized   pressure-redistributing mattress utilized   specialty bed utilized  Skin Protection:   incontinence pads utilized   skin sealant/moisture barrier applied   transparent dressing maintained  Taken 10/24/2024 2200 by Celeste Salinas RN  Activity Management: activity encouraged  Pressure Reduction Techniques:   pressure points protected   weight shift assistance provided  Head of Bed (hospitals) Positioning: HOB elevated  Pressure Reduction Devices:   positioning supports utilized   pressure-redistributing mattress utilized   specialty bed utilized  Skin Protection:   incontinence pads utilized   skin sealant/moisture barrier applied   transparent dressing maintained  Taken 10/24/2024 2000 by Celeste Salinas RN  Activity Management: activity encouraged  Pressure Reduction Techniques:   pressure points protected   weight shift assistance provided  Head of Bed (hospitals) Positioning: hospitals elevated  Pressure Reduction Devices:   pressure-redistributing mattress utilized   positioning supports utilized   heel offloading device utilized  Skin Protection:   incontinence pads utilized   skin sealant/moisture barrier applied   transparent dressing maintained     Problem: Fall Injury Risk  Goal: Absence of Fall and Fall-Related Injury  Outcome: Progressing  Intervention: Identify and Manage Contributors  Recent Flowsheet Documentation  Taken 10/25/2024 0428 by Celeste Salinas RN  Medication Review/Management: medications reviewed  Self-Care Promotion: BADL personal objects within reach  Taken 10/25/2024 0220 by Celeste Salinas RN  Medication Review/Management: medications reviewed  Self-Care  Promotion: BADL personal objects within reach  Taken 10/25/2024 0000 by Celeste Salinas RN  Self-Care Promotion: BADL personal objects within reach  Taken 10/24/2024 2200 by Celeste Salinas RN  Medication Review/Management: medications reviewed  Self-Care Promotion: BADL personal objects within reach  Taken 10/24/2024 2000 by Celeste Salinas RN  Medication Review/Management: medications reviewed  Self-Care Promotion: BADL personal objects within reach  Intervention: Promote Injury-Free Environment  Recent Flowsheet Documentation  Taken 10/25/2024 0428 by Celeste Salinas RN  Safety Promotion/Fall Prevention:   activity supervised   assistive device/personal items within reach   clutter free environment maintained   fall prevention program maintained   lighting adjusted   nonskid shoes/slippers when out of bed   room organization consistent   safety round/check completed  Taken 10/25/2024 0220 by Celeste Salinas RN  Safety Promotion/Fall Prevention:   activity supervised   assistive device/personal items within reach   clutter free environment maintained   fall prevention program maintained   lighting adjusted   nonskid shoes/slippers when out of bed   room organization consistent   safety round/check completed  Taken 10/25/2024 0000 by Celeste Salinas RN  Safety Promotion/Fall Prevention:   activity supervised   assistive device/personal items within reach   clutter free environment maintained   fall prevention program maintained   lighting adjusted   nonskid shoes/slippers when out of bed   room organization consistent   safety round/check completed  Taken 10/24/2024 2200 by Celeste Salinas RN  Safety Promotion/Fall Prevention:   activity supervised   assistive device/personal items within reach   clutter free environment maintained   fall prevention program maintained   lighting adjusted   nonskid shoes/slippers when out of bed   room organization consistent   safety  round/check completed  Taken 10/24/2024 2000 by Celeste Salinas, RN  Safety Promotion/Fall Prevention:   activity supervised   assistive device/personal items within reach   clutter free environment maintained   fall prevention program maintained   lighting adjusted   nonskid shoes/slippers when out of bed   room organization consistent   safety round/check completed   Goal Outcome Evaluation:

## 2024-10-25 NOTE — PROGRESS NOTES
[facility] Medicine Services  PROGRESS NOTE    Patient Name: Omkar Nava  : 1955  MRN: [id number]    Date of Admission: 10/20/2024  Primary Care Physician: Alberto Dye MD    Subjective   Subjective     CC:  Vomiting    HPI:  Patient alert this AM. Requesting PO intake. Discussed with patient we have a speech specialists following him and assessing his safety. Denies any complaints at this time.       Objective   Objective     Vital Signs:   Temp:  [92 °F (33.3 °C)-98.2 °F (36.8 °C)] 92 °F (33.3 °C)  Heart Rate:  [45-56] 47  Resp:  [16-18] 18  BP: (102-140)/(54-74) 126/68  Flow (L/min) (Oxygen Therapy):  [0] 0     Physical Exam  Constitutional:       General: He is not in acute distress.  Eyes:      Extraocular Movements: Extraocular movements intact.   Cardiovascular:      Rate and Rhythm: Normal rate.      Pulses: Normal pulses.   Pulmonary:      Effort: Pulmonary effort is normal. No respiratory distress.   Abdominal:      General: There is no distension.      Palpations: Abdomen is soft.      Tenderness: There is no abdominal tenderness. There is no guarding or rebound.   Musculoskeletal:      Right lower leg: No edema.      Left lower leg: No edema.   Skin:     General: Skin is warm.   Neurological:      Mental Status: Mental status is at baseline.          Results Reviewed:  LAB RESULTS:      Lab 10/25/24  0943 10/24/24  0915 10/23/24  0943 10/22/24  1317 10/20/24  2049   WBC 10.34 15.34* 14.01* 5.80 8.28   HEMOGLOBIN 7.8* 8.6* 8.5* 8.4* 8.2*   HEMATOCRIT 23.8* 26.0* 25.7* 26.3* 25.3*   PLATELETS 178 206 221 206 232   NEUTROS ABS 7.95* 12.42* 11.60* 3.80 6.79   IMMATURE GRANS (ABS) 0.09* 0.11* 0.07* 0.01 0.05   LYMPHS ABS 1.35 1.50 1.19 1.30 0.99   MONOS ABS 0.75 1.13* 1.11* 0.57 0.43   EOS ABS 0.18 0.16 0.02 0.11 0.01   MCV 92.2 93.5 93.1 92.6 94.8   PROCALCITONIN  --   --   --  0.05 0.13   LACTATE  --   --   --   --  0.7         Lab 10/25/24  0943 10/24/24  0915  10/23/24  0943 10/21/24  1136 10/20/24  2049   SODIUM 140 140 142 143 146*   POTASSIUM 4.4 5.0 4.6 5.0 5.5*   CHLORIDE 102 105 107 110* 110*   CO2 28.0 27.0 25.0 23.0 25.0   ANION GAP 10.0 8.0 10.0 10.0 11.0   BUN 34* 36* 33* 30* 40*   CREATININE 0.94 1.01 1.25 1.12 1.32*   EGFR 88.9 81.5 63.1 72.0 59.1*   GLUCOSE 171* 181* 153* 138* 130*   CALCIUM 8.7 9.3 8.8 9.5 9.6         Lab 10/23/24  0943 10/20/24  2049   TOTAL PROTEIN 5.9* 7.6   ALBUMIN 3.2* 4.0   GLOBULIN 2.7 3.6   ALT (SGPT) 22 31   AST (SGOT) 13 22   BILIRUBIN <0.2 <0.2   ALK PHOS 92 97   LIPASE  --  18                     Lab 10/22/24  1325   FIO2 28   CARBOXYHEMOGLOBIN (VENOUS) 1.0     Brief Urine Lab Results  (Last result in the past 365 days)        Color   Clarity   Blood   Leuk Est   Nitrite   Protein   CREAT   Urine HCG        10/20/24 2123 Yellow   Clear   Negative   Negative   Negative   100 mg/dL (2+)                   Microbiology Results Abnormal       Procedure Component Value - Date/Time    Blood Culture - Blood, Hand, Right [[id number]]  (Normal) Collected: 10/21/24 0107    Lab Status: Preliminary result Specimen: Blood from Hand, Right Updated: 10/25/24 0215     Blood Culture No growth at 4 days    Blood Culture - Blood, Arm, Right [[id number]]  (Normal) Collected: 10/21/24 0107    Lab Status: Preliminary result Specimen: Blood from Arm, Right Updated: 10/25/24 0215     Blood Culture No growth at 4 days            No radiology results from the last 24 hrs    Results for orders placed during the hospital encounter of 01/15/24    Adult Transthoracic Echo Complete With Contrast if Necessary Per Protocol    Interpretation Summary    Left ventricular ejection fraction appears to be 56 - 60%.    Left ventricular wall thickness is consistent with hypertrophy.    Estimated right ventricular systolic pressure from tricuspid regurgitation is mildly elevated (35-45 mmHg).    There is a small (1-2cm) pericardial effusion.    No evidence for pericardial  tamponade      Current medications:  Scheduled Meds:albuterol, 2.5 mg, Nebulization, Q6H - RT  amLODIPine, 10 mg, Per PEG Tube, Q24H  amoxicillin, 875 mg, Per PEG Tube, Q12H  ARIPiprazole, 5 mg, Per G Tube, Daily  brimonidine, 1 drop, Both Eyes, TID  carvedilol, 12.5 mg, Per PEG Tube, Q12H  cloNIDine, 0.2 mg, Per PEG Tube, Q8H  enoxaparin, 40 mg, Subcutaneous, Daily  FLUoxetine, 20 mg, Per PEG Tube, Daily  hydrALAZINE, 100 mg, Per PEG Tube, Q8H  insulin lispro, 2-7 Units, Subcutaneous, 4x Daily AC & at Bedtime  lansoprazole, 30 mg, Per PEG Tube, Q AM  palliative care oral rinse, 5 mL, Mouth/Throat, 4x Daily  QUEtiapine, 25 mg, Per PEG Tube, Nightly  sodium chloride, 10 mL, Intravenous, Q12H  terazosin, 5 mg, Per G Tube, Q12H  timolol, 1 drop, Both Eyes, BID  Valproic Acid, 150 mg, Nasogastric, Q8H      Continuous Infusions:   PRN Meds:.  acetaminophen **OR** acetaminophen **OR** acetaminophen    senna-docusate sodium **AND** polyethylene glycol **AND** [DISCONTINUED] bisacodyl **AND** bisacodyl    dextrose    glucagon (human recombinant)    hydrOXYzine    Magnesium Standard Dose Replacement - Follow Nurse / BPA Driven Protocol    nitroglycerin    [DISCONTINUED] ondansetron ODT **OR** ondansetron    Phosphorus Replacement - Follow Nurse / BPA Driven Protocol    Potassium Replacement - Follow Nurse / BPA Driven Protocol    sodium chloride    sodium chloride    Assessment & Plan   Assessment & Plan     Active Hospital Problems    Diagnosis  POA    **UTI (urinary tract infection) [N39.0]  Yes    Acute encephalopathy [G93.40]  Yes    Vomiting [R11.10]  Yes    Severe vascular dementia without behavioral disturbance, psychotic disturbance, mood disturbance, or anxiety [F01.C0]  Yes    Protein calorie malnutrition [E46]  Yes    S/P transmetatarsal amputation of foot, left [Z89.432]  Not Applicable    Anemia of chronic disease [D63.8]  Yes    Neurocognitive disorder [R41.9]  Yes    Type 2 diabetes mellitus [E11.9]  Yes     Essential hypertension [I10]  Yes    Hypertensive heart and chronic kidney disease 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 failure, with stage 1 through stage 4 chronic kidney disease, or unspecified chronic kidney disease [I13.10]  Yes    Long term (current) use of insulin [Z79.4]  Not Applicable      Resolved Hospital Problems   No resolved problems to display.        Brief Hospital Course to date:  Omkar Nava is a 69 y.o. male with a PMHx of hypertension, diabetes, chronic kidney disease, vascular dementia, dysphagia, and chronic iron def anemia admitted with increased vomiting, with concern for possible underlying infection. Found to be positive for Rhinovirus and UTI. Working with SLP for dysphagia.      Acute encephalopathy likely secondary to infection  UTI/viral pneumonia  --UA with 4+ bacteria and urine cultures growing Enterococcus faecalis.    -- In setting of worsening leukocytosis, will treat with amoxicillin for 5 days.  -- Respiratory panel positive for rhinovirus  -- follow up blood cultures      Hyperkalemia.  resolved  --s/p lokelma     JAYY on CKD-stage 2. Resolved   -Baseline creatinine near 1.1-1.2  -Creatinine today 0.94  -Status post IVF     Chronic aspiration  - PEG tube in place  --Nutrition to recommend TF  --Pt with pureed foods, nectar thick liquids per daughter.--although this is different from SLP notes from last admission  --SLP consulted     Chronic anemia  - At baseline, cont to monitor.     HFpEF  HTN  --Echo with EF of 56 to 60%  - Continue carvedilol, clonidine, terazosin, hydralazine  -- Hold diuretics     T2DM  - A1c 5.6 last hospitalization  --Due to poor intake, hold levemir for now.  Cover with SSI.     Dementia with agitation  Questionable seizure disorder  - Seroquel, Depakote, Abilify, prozac    Expected Discharge Location and Transportation: TBD  Expected Discharge   Expected Discharge Date: 10/26/2024; Expected Discharge Time:      VTE Prophylaxis:  Pharmacologic VTE prophylaxis orders are  present.         AM-PAC 6 Clicks Score (PT): 6 (10/25/24 0800)    CODE STATUS:   Code Status and Medical Interventions: CPR (Attempt to Resuscitate); Full Support   Ordered at: 10/21/24 0146     Level Of Support Discussed With:    Health Care Surrogate     Code Status (Patient has no pulse and is not breathing):    CPR (Attempt to Resuscitate)     Medical Interventions (Patient has pulse or is breathing):    Full Support       Ryne Burgos DO  10/25/24

## 2024-10-26 ENCOUNTER — APPOINTMENT (OUTPATIENT)
Dept: GENERAL RADIOLOGY | Facility: HOSPITAL | Age: 69
End: 2024-10-26
Payer: MEDICARE

## 2024-10-26 LAB
BACTERIA SPEC AEROBE CULT: NORMAL
BACTERIA SPEC AEROBE CULT: NORMAL
GLUCOSE BLDC GLUCOMTR-MCNC: 139 MG/DL (ref 70–130)
GLUCOSE BLDC GLUCOMTR-MCNC: 171 MG/DL (ref 70–130)
MRSA DNA SPEC QL NAA+PROBE: POSITIVE
PROCALCITONIN SERPL-MCNC: 0.16 NG/ML (ref 0–0.25)

## 2024-10-26 PROCEDURE — 99232 SBSQ HOSP IP/OBS MODERATE 35: CPT | Performed by: STUDENT IN AN ORGANIZED HEALTH CARE EDUCATION/TRAINING PROGRAM

## 2024-10-26 PROCEDURE — 94761 N-INVAS EAR/PLS OXIMETRY MLT: CPT

## 2024-10-26 PROCEDURE — 71045 X-RAY EXAM CHEST 1 VIEW: CPT

## 2024-10-26 PROCEDURE — 94799 UNLISTED PULMONARY SVC/PX: CPT

## 2024-10-26 PROCEDURE — 25010000002 PIPERACILLIN SOD-TAZOBACTAM PER 1 G: Performed by: STUDENT IN AN ORGANIZED HEALTH CARE EDUCATION/TRAINING PROGRAM

## 2024-10-26 PROCEDURE — 94664 DEMO&/EVAL PT USE INHALER: CPT

## 2024-10-26 PROCEDURE — 63710000001 INSULIN LISPRO (HUMAN) PER 5 UNITS: Performed by: HOSPITALIST

## 2024-10-26 PROCEDURE — 25010000002 ENOXAPARIN PER 10 MG: Performed by: PEDIATRICS

## 2024-10-26 PROCEDURE — 25010000002 VANCOMYCIN 1.5-0.9 GM/500ML-% SOLUTION

## 2024-10-26 PROCEDURE — 84145 PROCALCITONIN (PCT): CPT

## 2024-10-26 PROCEDURE — 87641 MR-STAPH DNA AMP PROBE: CPT

## 2024-10-26 PROCEDURE — 82948 REAGENT STRIP/BLOOD GLUCOSE: CPT

## 2024-10-26 RX ORDER — INSULIN LISPRO 100 [IU]/ML
2-7 INJECTION, SOLUTION INTRAVENOUS; SUBCUTANEOUS EVERY 6 HOURS SCHEDULED
Status: DISCONTINUED | OUTPATIENT
Start: 2024-10-26 | End: 2024-10-31 | Stop reason: HOSPADM

## 2024-10-26 RX ORDER — VANCOMYCIN/0.9 % SOD CHLORIDE 1.5G/250ML
1500 PLASTIC BAG, INJECTION (ML) INTRAVENOUS ONCE
Status: COMPLETED | OUTPATIENT
Start: 2024-10-26 | End: 2024-10-26

## 2024-10-26 RX ADMIN — CARVEDILOL 12.5 MG: 6.25 TABLET, FILM COATED ORAL at 21:21

## 2024-10-26 RX ADMIN — AMOXICILLIN 875 MG: 875 TABLET, FILM COATED ORAL at 09:12

## 2024-10-26 RX ADMIN — TERAZOSIN HYDROCHLORIDE 5 MG: 5 CAPSULE ORAL at 21:21

## 2024-10-26 RX ADMIN — Medication 10 ML: at 09:14

## 2024-10-26 RX ADMIN — HYDRALAZINE HYDROCHLORIDE 100 MG: 50 TABLET ORAL at 14:14

## 2024-10-26 RX ADMIN — BRIMONIDINE TARTRATE 1 DROP: 2 SOLUTION/ DROPS OPHTHALMIC at 21:23

## 2024-10-26 RX ADMIN — ALBUTEROL SULFATE 2.5 MG: 2.5 SOLUTION RESPIRATORY (INHALATION) at 07:33

## 2024-10-26 RX ADMIN — MINERAL OIL 5 ML: 1000 LIQUID ORAL at 09:14

## 2024-10-26 RX ADMIN — HYDRALAZINE HYDROCHLORIDE 100 MG: 50 TABLET ORAL at 21:21

## 2024-10-26 RX ADMIN — HYDRALAZINE HYDROCHLORIDE 100 MG: 50 TABLET ORAL at 06:30

## 2024-10-26 RX ADMIN — MINERAL OIL 5 ML: 1000 LIQUID ORAL at 12:03

## 2024-10-26 RX ADMIN — QUETIAPINE FUMARATE 25 MG: 25 TABLET ORAL at 21:21

## 2024-10-26 RX ADMIN — Medication 10 ML: at 21:22

## 2024-10-26 RX ADMIN — CARVEDILOL 12.5 MG: 6.25 TABLET, FILM COATED ORAL at 09:13

## 2024-10-26 RX ADMIN — ALBUTEROL SULFATE 2.5 MG: 2.5 SOLUTION RESPIRATORY (INHALATION) at 19:30

## 2024-10-26 RX ADMIN — MINERAL OIL 5 ML: 1000 LIQUID ORAL at 17:41

## 2024-10-26 RX ADMIN — TIMOLOL MALEATE 1 DROP: 5 SOLUTION/ DROPS OPHTHALMIC at 21:23

## 2024-10-26 RX ADMIN — FLUOXETINE HYDROCHLORIDE 20 MG: 20 SOLUTION ORAL at 09:12

## 2024-10-26 RX ADMIN — PIPERACILLIN AND TAZOBACTAM 3.38 G: 3; .375 INJECTION, POWDER, LYOPHILIZED, FOR SOLUTION INTRAVENOUS at 11:55

## 2024-10-26 RX ADMIN — VALPROIC ACID 150 MG: 250 SOLUTION ORAL at 21:20

## 2024-10-26 RX ADMIN — CLONIDINE HYDROCHLORIDE 0.2 MG: 0.1 TABLET ORAL at 06:30

## 2024-10-26 RX ADMIN — Medication 1500 MG: at 13:10

## 2024-10-26 RX ADMIN — BRIMONIDINE TARTRATE 1 DROP: 2 SOLUTION/ DROPS OPHTHALMIC at 17:41

## 2024-10-26 RX ADMIN — TERAZOSIN HYDROCHLORIDE 5 MG: 5 CAPSULE ORAL at 09:12

## 2024-10-26 RX ADMIN — PIPERACILLIN AND TAZOBACTAM 3.38 G: 3; .375 INJECTION, POWDER, LYOPHILIZED, FOR SOLUTION INTRAVENOUS at 17:41

## 2024-10-26 RX ADMIN — VALPROIC ACID 150 MG: 250 SOLUTION ORAL at 14:15

## 2024-10-26 RX ADMIN — LANSOPRAZOLE 30 MG: 15 TABLET, ORALLY DISINTEGRATING ORAL at 06:16

## 2024-10-26 RX ADMIN — VALPROIC ACID 150 MG: 250 SOLUTION ORAL at 06:16

## 2024-10-26 RX ADMIN — CLONIDINE HYDROCHLORIDE 0.2 MG: 0.1 TABLET ORAL at 14:14

## 2024-10-26 RX ADMIN — INSULIN LISPRO 2 UNITS: 100 INJECTION, SOLUTION INTRAVENOUS; SUBCUTANEOUS at 09:25

## 2024-10-26 RX ADMIN — CLONIDINE HYDROCHLORIDE 0.2 MG: 0.1 TABLET ORAL at 21:21

## 2024-10-26 RX ADMIN — ALBUTEROL SULFATE 2.5 MG: 2.5 SOLUTION RESPIRATORY (INHALATION) at 12:16

## 2024-10-26 RX ADMIN — ARIPIPRAZOLE 5 MG: 5 TABLET ORAL at 09:12

## 2024-10-26 RX ADMIN — MINERAL OIL 5 ML: 1000 LIQUID ORAL at 21:22

## 2024-10-26 RX ADMIN — BRIMONIDINE TARTRATE 1 DROP: 2 SOLUTION/ DROPS OPHTHALMIC at 09:14

## 2024-10-26 RX ADMIN — TIMOLOL MALEATE 1 DROP: 5 SOLUTION/ DROPS OPHTHALMIC at 09:13

## 2024-10-26 RX ADMIN — AMLODIPINE BESYLATE 10 MG: 10 TABLET ORAL at 09:12

## 2024-10-26 RX ADMIN — ENOXAPARIN SODIUM 40 MG: 100 INJECTION SUBCUTANEOUS at 09:13

## 2024-10-26 NOTE — NURSING NOTE
"10/25/24    2020 Nurse and Tech unable to obtain Axillary temp. Patient is confused at baseline  admit about not wanting a rectal temp taken stating \" I dont do that sh*t.\"    Education provided about importance of getting an accurate temp. Patient continued to refuse. Warm blankets on patient. RN will attempt an axillary temp again.       2100 nurse back to check temp. Unable to get axillary or oral temp. Patient still refusing.  POA called. Explained need for accurate temp, patient refusal. POA spoke with patient on the phone. OK from POA to get rectal temp. POA requested rn to call back to report temp.     Call placed to on call provider to report situation and get orders for a warming system.    Rectal temp low. Call paled to provider to report. Warming blanket on patient      2230 on call provider ok with drawing AM jeff now      2337 Warming blanket removed  "

## 2024-10-26 NOTE — PROGRESS NOTES
[facility]  PROGRESS NOTE    Patient Name: Omkar Nava  : 1955  MRN: [id number]    Date of Admission: 10/20/2024  Primary Care Physician: Alberto Dye MD    Subjective   Subjective     CC:  Vomiting    HPI:  Overnight, patient became hypothermic requiring external warming.  On evaluation this morning, patient was on external warming with normalized temperature.  Patient was somnolent though appeared to be at baseline mentation.    Objective   Objective     Vital Signs:   Temp:  [92.5 °F (33.6 °C)-98.5 °F (36.9 °C)] 98.4 °F (36.9 °C)  Heart Rate:  [45-65] 56  Resp:  [16-18] 18  BP: (126-156)/(53-80) 149/60     Physical Exam  Constitutional:       General: He is not in acute distress.  Eyes:      Extraocular Movements: Extraocular movements intact.   Cardiovascular:      Rate and Rhythm: Normal rate.      Pulses: Normal pulses.   Pulmonary:      Effort: Pulmonary effort is normal. No respiratory distress.   Abdominal:      General: There is no distension.      Palpations: Abdomen is soft.      Tenderness: There is no abdominal tenderness. There is no guarding or rebound.   Musculoskeletal:      Right lower leg: No edema.      Left lower leg: No edema.   Skin:     General: Skin is warm.   Neurological:      Mental Status: Mental status is at baseline.          Results Reviewed:  LAB RESULTS:      Lab 10/26/24  1206 10/25/24  2219 10/25/24  0943 10/24/24  0915 10/23/24  0943 10/22/24  1317 10/20/24  2049   WBC  --  9.12 10.34 15.34* 14.01* 5.80 8.28   HEMOGLOBIN  --  8.1* 7.8* 8.6* 8.5* 8.4* 8.2*   HEMATOCRIT  --  24.4* 23.8* 26.0* 25.7* 26.3* 25.3*   PLATELETS  --  196 178 206 221 206 232   NEUTROS ABS  --  7.26* 7.95* 12.42* 11.60* 3.80 6.79   IMMATURE GRANS (ABS)  --  0.07* 0.09* 0.11* 0.07* 0.01 0.05   LYMPHS ABS  --  1.06 1.35 1.50 1.19 1.30 0.99   MONOS ABS  --  0.59 0.75 1.13* 1.11* 0.57 0.43   EOS ABS  --  0.12 0.18 0.16 0.02 0.11 0.01   MCV  --  93.1  92.2 93.5 93.1 92.6 94.8   PROCALCITONIN 0.16  --   --   --   --  0.05 0.13   LACTATE  --  1.0  --   --   --   --  0.7         Lab 10/25/24  0943 10/24/24  0915 10/23/24  0943 10/21/24  1136 10/20/24  2049   SODIUM 140 140 142 143 146*   POTASSIUM 4.4 5.0 4.6 5.0 5.5*   CHLORIDE 102 105 107 110* 110*   CO2 28.0 27.0 25.0 23.0 25.0   ANION GAP 10.0 8.0 10.0 10.0 11.0   BUN 34* 36* 33* 30* 40*   CREATININE 0.94 1.01 1.25 1.12 1.32*   EGFR 88.9 81.5 63.1 72.0 59.1*   GLUCOSE 171* 181* 153* 138* 130*   CALCIUM 8.7 9.3 8.8 9.5 9.6         Lab 10/23/24  0943 10/20/24  2049   TOTAL PROTEIN 5.9* 7.6   ALBUMIN 3.2* 4.0   GLOBULIN 2.7 3.6   ALT (SGPT) 22 31   AST (SGOT) 13 22   BILIRUBIN <0.2 <0.2   ALK PHOS 92 97   LIPASE  --  18                     Lab 10/22/24  1325   FIO2 28   CARBOXYHEMOGLOBIN (VENOUS) 1.0     Brief Urine Lab Results  (Last result in the past 365 days)        Color   Clarity   Blood   Leuk Est   Nitrite   Protein   CREAT   Urine HCG        10/20/24 2123 Yellow   Clear   Negative   Negative   Negative   100 mg/dL (2+)                   Microbiology Results Abnormal       Procedure Component Value - Date/Time    Blood Culture - Blood, Arm, Right [[id number]]  (Normal) Collected: 10/21/24 0107    Lab Status: Final result Specimen: Blood from Arm, Right Updated: 10/26/24 0215     Blood Culture No growth at 5 days    Blood Culture - Blood, Hand, Right [[id number]]  (Normal) Collected: 10/21/24 0107    Lab Status: Final result Specimen: Blood from Hand, Right Updated: 10/26/24 0215     Blood Culture No growth at 5 days            XR Chest 1 View    Result Date: 10/26/2024  XR CHEST 1 VW Date of Exam: 10/26/2024 8:18 AM EDT Indication: Hypothermic overnight, infectious workup Comparison: CT chest 10/20/2024, AP chest x-ray 3/31/2024 Findings: The patient is rotated. Airspace opacities appear decreased compared to most recent chest x-ray from 3/31/2024, but there are bibasilar airspace opacities, right greater  than left. No pneumothorax or large pleural effusion is seen. Cardiomediastinal contours are stable.     Impression: Impression: Bibasilar airspace opacities, right greater than left, which may be due to atelectasis and/or pneumonia. Electronically Signed: Agnes Freed  10/26/2024 9:41 AM EDT  Workstation ID: [id number]     Results for orders placed during the hospital encounter of 01/15/24    Adult Transthoracic Echo Complete With Contrast if Necessary Per Protocol    Interpretation Summary    Left ventricular ejection fraction appears to be 56 - 60%.    Left ventricular wall thickness is consistent with hypertrophy.    Estimated right ventricular systolic pressure from tricuspid regurgitation is mildly elevated (35-45 mmHg).    There is a small (1-2cm) pericardial effusion.    No evidence for pericardial tamponade      Current medications:  Scheduled Meds:[START ON 10/28/2024] !Vancomycin Level Draw Needed, , Does not apply, Once  albuterol, 2.5 mg, Nebulization, Q6H - RT  amLODIPine, 10 mg, Per PEG Tube, Q24H  ARIPiprazole, 5 mg, Per G Tube, Daily  brimonidine, 1 drop, Both Eyes, TID  carvedilol, 12.5 mg, Per PEG Tube, Q12H  cloNIDine, 0.2 mg, Per PEG Tube, Q8H  enoxaparin, 40 mg, Subcutaneous, Daily  FLUoxetine, 20 mg, Per PEG Tube, Daily  hydrALAZINE, 100 mg, Per PEG Tube, Q8H  insulin lispro, 2-7 Units, Subcutaneous, Q6H  lansoprazole, 30 mg, Per PEG Tube, Q AM  palliative care oral rinse, 5 mL, Mouth/Throat, 4x Daily  piperacillin-tazobactam, 3.375 g, Intravenous, Q8H  QUEtiapine, 25 mg, Per PEG Tube, Nightly  sodium chloride, 10 mL, Intravenous, Q12H  terazosin, 5 mg, Per G Tube, Q12H  timolol, 1 drop, Both Eyes, BID  Valproic Acid, 150 mg, Nasogastric, Q8H  [START ON 10/27/2024] vancomycin, 750 mg, Intravenous, Q12H      Continuous Infusions:Pharmacy to dose vancomycin,       PRN Meds:.  acetaminophen **OR** acetaminophen **OR** acetaminophen    senna-docusate sodium **AND** polyethylene glycol **AND**  [DISCONTINUED] bisacodyl **AND** bisacodyl    dextrose    glucagon (human recombinant)    hydrOXYzine    Magnesium Standard Dose Replacement - Follow Nurse / BPA Driven Protocol    nitroglycerin    [DISCONTINUED] ondansetron ODT **OR** ondansetron    Pharmacy to dose vancomycin    Phosphorus Replacement - Follow Nurse / BPA Driven Protocol    Potassium Replacement - Follow Nurse / BPA Driven Protocol    sodium chloride    sodium chloride    Assessment & Plan   Assessment & Plan     Active Hospital Problems    Diagnosis  POA    **UTI (urinary tract infection) [N39.0]  Yes    Hypothermia [T68.XXXA]  Yes    Acute encephalopathy [G93.40]  Yes    Vomiting [R11.10]  Yes    Severe vascular dementia without behavioral disturbance, psychotic disturbance, mood disturbance, or anxiety [F01.C0]  Yes    Protein calorie malnutrition [E46]  Yes    S/P transmetatarsal amputation of foot, left [Z89.432]  Not Applicable    Anemia of chronic disease [D63.8]  Yes    Neurocognitive disorder [R41.9]  Yes    Type 2 diabetes mellitus [E11.9]  Yes    Essential hypertension [I10]  Yes    Hypertensive heart and chronic kidney disease 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 failure, with stage 1 through stage 4 chronic kidney disease, or unspecified chronic kidney disease [I13.10]  Yes    Long term (current) use of insulin [Z79.4]  Not Applicable      Resolved Hospital Problems   No resolved problems to display.        Brief Hospital Course to date:  Omkar Nava is a 69 y.o. male with a PMHx of hypertension, diabetes, chronic kidney disease, vascular dementia, dysphagia, and chronic iron def anemia admitted with increased vomiting, with concern for possible underlying infection. Found to be positive for Rhinovirus and UTI.  The patient initially on amoxicillin for UTI.  Overnight on 10/25 through 10/26 patient became hypothermic with a T minimum of 92 degrees, requiring external warming.  Chest x-ray revealed bibasilar opacities. Antibiotics broadened on 10/26.      Postviral pneumonia versus HAP  - On 10/25, patient became hypothermic requiring external warming (Tmin 92)  - Infectious workup obtained including blood cultures and chest x-ray  - Currently on amoxicillin for UTI  - Chest x-ray revealing bibasilar opacities  - MRSA nares positive  - No leukocytosis, procal 0.05 -> 0.16, lactate 1.0  PLAN  - Will broaden antibiotics to Vanc and Zosyn  - Follow up blood cultures      Acute encephalopathy likely secondary to infection  Initial UTI/viral pneumonia  - UA with 4+ bacteria and urine cultures growing Enterococcus faecalis.    - Respiratory panel positive for rhinovirus  - Abx per above     Hyperkalemia, resolved  - s/p lokelma     JAYY on CKD-stage 2, resolved   - Baseline creatinine near 1.1-1.2  - Cr near baseline  - Status post IVF     Chronic aspiration  - PEG tube in place  - Nutrition to recommend TF  - Pt with pureed foods, nectar thick liquids per daughter.--although this is different from SLP notes from last admission  - SLP consulted     Chronic anemia  - At baseline, cont to monitor.     HFpEF  HTN  - Echo with EF of 56 to 60%  - Continue carvedilol, clonidine, terazosin, hydralazine  - Hold diuretics     T2DM  - A1c 5.6 last hospitalization  - Due to poor intake, hold levemir for now.  Cover with SSI.     Dementia with agitation  Questionable seizure disorder  - Seroquel, Depakote, Abilify, prozac    Expected Discharge Location and Transportation: D  Expected Discharge   Expected Discharge Date: 10/26/2024; Expected Discharge Time:      VTE Prophylaxis:  Pharmacologic VTE prophylaxis orders are present.         AM-PAC 6 Clicks Score (PT): 6 (10/25/24 0800)    CODE STATUS:   Code Status and Medical Interventions: CPR (Attempt to Resuscitate); Full Support   Ordered at: 10/21/24 0141     Level Of Support Discussed With:    Health Care Surrogate     Code Status (Patient has no pulse and is not breathing):    CPR (Attempt to Resuscitate)     Medical  Interventions (Patient has pulse or is breathing):    Full Support       Ryne Burgos,   10/26/24

## 2024-10-26 NOTE — PROGRESS NOTES
"Pharmacy Consult - Vancomycin Dosing and Monitoring    Omkar Nava is a 69 y.o. male receiving vancomycin therapy.     Indication: PNA--HAP  Consulting Provider: hospitalist  ID Consult: N    Goal AUC: 400-600 mg/L*hr    Current Antimicrobial Therapy  Anti-Infectives (From admission, onward)      Ordered     Dose/Rate Route Frequency Start Stop    10/26/24 1134  piperacillin-tazobactam (ZOSYN) 3.375 g IVPB in 100 mL NS MBP (CD)        Ordering Provider: Ryne Burgos DO    3.375 g  over 4 Hours Intravenous Every 8 Hours 10/26/24 1830 10/31/24 1829    10/26/24 1134  piperacillin-tazobactam (ZOSYN) 3.375 g IVPB in 100 mL NS MBP (CD)        Ordering Provider: Ryne Burgos DO    3.375 g  over 30 Minutes Intravenous Once 10/26/24 1230      10/26/24 1134  Pharmacy to dose vancomycin        Ordering Provider: Ryne Burgos DO     Does not apply Continuous PRN 10/26/24 1132 10/31/24 1131    10/20/24 2318  doxycycline (VIBRAMYCIN) 100 mg in sodium chloride 0.9 % 100 mL MBP        Ordering Provider: Taryn Stover MD    100 mg  over 60 Minutes Intravenous Once 10/20/24 2334 10/21/24 0543    10/20/24 2249  cefTRIAXone (ROCEPHIN) 2,000 mg in sodium chloride 0.9 % 100 mL MBP        Ordering Provider: Taryn Stover MD    2,000 mg  200 mL/hr over 30 Minutes Intravenous Once 10/20/24 2305 10/21/24 0400          Allergies  Allergies as of 10/20/2024    (No Known Allergies)     Labs  Results from last 7 days   Lab Units 10/25/24  0943 10/24/24  0915 10/23/24  0943   BUN mg/dL 34* 36* 33*   CREATININE mg/dL 0.94 1.01 1.25     Results from last 7 days   Lab Units 10/25/24  2219 10/25/24  0943 10/24/24  0915   WBC 10*3/mm3 9.12 10.34 15.34*     Evaluation of Dosing     Last Dose Received in the ED/Outside Facility: N  Is Patient on Dialysis or Renal Replacement: N    Height - 172.7 cm (68\")  Weight - 77 kg (169 lb 12.8 oz)    Estimated Creatinine Clearance: 83.1 mL/min (by C-G formula based on SCr of 0.94 mg/dL).    I/O " last 3 completed shifts:  In: 3593 [Other:700; NG/GT:2893]  Out: 1750 [Urine:1750]    Microbiology and Radiology  Microbiology Results (last 10 days)       Procedure Component Value - Date/Time    Respiratory Panel PCR w/COVID-19(SARS-CoV-2) GIANNI/SIENNA/ANNA/PAD/COR/ASHIA In-House, NP Swab in UTM/VTM, 2 HR TAT - Swab, Nasopharynx [[id number]]  (Abnormal) Collected: 10/22/24 1112    Lab Status: Final result Specimen: Swab from Nasopharynx Updated: 10/22/24 1228     ADENOVIRUS, PCR Not Detected     Coronavirus 229E Not Detected     Coronavirus HKU1 Not Detected     Coronavirus NL63 Not Detected     Coronavirus OC43 Not Detected     COVID19 Not Detected     Human Metapneumovirus Not Detected     Human Rhinovirus/Enterovirus Detected     Influenza A PCR Not Detected     Influenza B PCR Not Detected     Parainfluenza Virus 1 Not Detected     Parainfluenza Virus 2 Not Detected     Parainfluenza Virus 3 Not Detected     Parainfluenza Virus 4 Not Detected     RSV, PCR Not Detected     Bordetella pertussis pcr Not Detected     Bordetella parapertussis PCR Not Detected     Chlamydophila pneumoniae PCR Not Detected     Mycoplasma pneumo by PCR Not Detected    Narrative:      In the setting of a positive respiratory panel with a viral infection PLUS a negative procalcitonin without other underlying concern for bacterial infection, consider observing off antibiotics or discontinuation of antibiotics and continue supportive care. If the respiratory panel is positive for atypical bacterial infection (Bordetella pertussis, Chlamydophila pneumoniae, or Mycoplasma pneumoniae), consider antibiotic de-escalation to target atypical bacterial infection.    Blood Culture - Blood, Arm, Right [[id number]]  (Normal) Collected: 10/21/24 0107    Lab Status: Final result Specimen: Blood from Arm, Right Updated: 10/26/24 0215     Blood Culture No growth at 5 days    Blood Culture - Blood, Hand, Right [[id number]]  (Normal) Collected: 10/21/24 0107    Lab  Status: Final result Specimen: Blood from Hand, Right Updated: 10/26/24 0215     Blood Culture No growth at 5 days    Urine Culture - Urine, Straight Cath [[id number]]  (Abnormal)  (Susceptibility) Collected: 10/20/24 2123    Lab Status: Final result Specimen: Urine from Straight Cath Updated: 10/23/24 0902     Urine Culture >100,000 CFU/mL Enterococcus faecalis    Narrative:      Colonization of the urinary tract without infection is common. Treatment is discouraged unless the patient is symptomatic, pregnant, or undergoing an invasive urologic procedure.    Susceptibility        Enterococcus faecalis      KB      Ampicillin <=2 ug/ml Susceptible      Levofloxacin 0.5 ug/ml Susceptible      Nitrofurantoin <=16 ug/ml Susceptible      Vancomycin 2 ug/ml Susceptible                                 Reported Vancomycin Levels              [employer] AUC Calculation:    Current AUC: --- mg/L*hr    Predicted Steady State AUC on Current Dose: --- mg/L*hr  _________________________________    Predicted Steady State AUC on New Dose: 450 mg/L*hr    Assessment/Plan:     Pharmacy to dose vancomycin for HAP. Goal AUC: 400-600 mg/L*hr. MRSA PCR, cultures, procalcitonin in process.  Patient to receive loading dose of 1500mg on 10/26 at ~1230.  Will initiate maintenance dose of 750mg on 10/27 at 0100. Predicted Steady State AUC at current dose: 450 mg/L*hr.   Assess clearance by obtaining vancomycin trough level on 10/27 prior to dose #5.  Pharmacy will continue to monitor cultures, sensitivities, renal function, and clinical status, and will adjust regimen as necessary.    Thank you.  Hero Ty, PharmD  10/26/2024  11:46 EDT

## 2024-10-27 LAB
ANION GAP SERPL CALCULATED.3IONS-SCNC: 9 MMOL/L (ref 5–15)
BUN SERPL-MCNC: 29 MG/DL (ref 8–23)
BUN/CREAT SERPL: 28.2 (ref 7–25)
CALCIUM SPEC-SCNC: 8.7 MG/DL (ref 8.6–10.5)
CHLORIDE SERPL-SCNC: 102 MMOL/L (ref 98–107)
CO2 SERPL-SCNC: 27 MMOL/L (ref 22–29)
CREAT SERPL-MCNC: 1.03 MG/DL (ref 0.76–1.27)
DEPRECATED RDW RBC AUTO: 50.1 FL (ref 37–54)
EGFRCR SERPLBLD CKD-EPI 2021: 79.6 ML/MIN/1.73
ERYTHROCYTE [DISTWIDTH] IN BLOOD BY AUTOMATED COUNT: 14.6 % (ref 12.3–15.4)
GLUCOSE BLDC GLUCOMTR-MCNC: 155 MG/DL (ref 70–130)
GLUCOSE BLDC GLUCOMTR-MCNC: 189 MG/DL (ref 70–130)
GLUCOSE BLDC GLUCOMTR-MCNC: 216 MG/DL (ref 70–130)
GLUCOSE BLDC GLUCOMTR-MCNC: 237 MG/DL (ref 70–130)
GLUCOSE SERPL-MCNC: 101 MG/DL (ref 65–99)
HCT VFR BLD AUTO: 24.1 % (ref 37.5–51)
HGB BLD-MCNC: 8 G/DL (ref 13–17.7)
MCH RBC QN AUTO: 31.1 PG (ref 26.6–33)
MCHC RBC AUTO-ENTMCNC: 33.2 G/DL (ref 31.5–35.7)
MCV RBC AUTO: 93.8 FL (ref 79–97)
PLATELET # BLD AUTO: 209 10*3/MM3 (ref 140–450)
PMV BLD AUTO: 11.1 FL (ref 6–12)
POTASSIUM SERPL-SCNC: 4.5 MMOL/L (ref 3.5–5.2)
PROCALCITONIN SERPL-MCNC: 0.14 NG/ML (ref 0–0.25)
RBC # BLD AUTO: 2.57 10*6/MM3 (ref 4.14–5.8)
SODIUM SERPL-SCNC: 138 MMOL/L (ref 136–145)
WBC NRBC COR # BLD AUTO: 7.53 10*3/MM3 (ref 3.4–10.8)

## 2024-10-27 PROCEDURE — 80048 BASIC METABOLIC PNL TOTAL CA: CPT

## 2024-10-27 PROCEDURE — 25010000002 PIPERACILLIN SOD-TAZOBACTAM PER 1 G: Performed by: STUDENT IN AN ORGANIZED HEALTH CARE EDUCATION/TRAINING PROGRAM

## 2024-10-27 PROCEDURE — 94664 DEMO&/EVAL PT USE INHALER: CPT

## 2024-10-27 PROCEDURE — 99232 SBSQ HOSP IP/OBS MODERATE 35: CPT | Performed by: STUDENT IN AN ORGANIZED HEALTH CARE EDUCATION/TRAINING PROGRAM

## 2024-10-27 PROCEDURE — 25810000003 SODIUM CHLORIDE 0.9 % SOLUTION 250 ML FLEX CONT

## 2024-10-27 PROCEDURE — 25010000002 ENOXAPARIN PER 10 MG: Performed by: PEDIATRICS

## 2024-10-27 PROCEDURE — 63710000001 INSULIN LISPRO (HUMAN) PER 5 UNITS: Performed by: STUDENT IN AN ORGANIZED HEALTH CARE EDUCATION/TRAINING PROGRAM

## 2024-10-27 PROCEDURE — 25010000002 ONDANSETRON PER 1 MG: Performed by: PEDIATRICS

## 2024-10-27 PROCEDURE — 94799 UNLISTED PULMONARY SVC/PX: CPT

## 2024-10-27 PROCEDURE — 85027 COMPLETE CBC AUTOMATED: CPT

## 2024-10-27 PROCEDURE — 25010000002 VANCOMYCIN 750 MG RECONSTITUTED SOLUTION 1 EACH VIAL

## 2024-10-27 PROCEDURE — 84145 PROCALCITONIN (PCT): CPT | Performed by: STUDENT IN AN ORGANIZED HEALTH CARE EDUCATION/TRAINING PROGRAM

## 2024-10-27 PROCEDURE — 82948 REAGENT STRIP/BLOOD GLUCOSE: CPT

## 2024-10-27 RX ADMIN — CLONIDINE HYDROCHLORIDE 0.2 MG: 0.1 TABLET ORAL at 21:10

## 2024-10-27 RX ADMIN — LANSOPRAZOLE 30 MG: 15 TABLET, ORALLY DISINTEGRATING ORAL at 05:28

## 2024-10-27 RX ADMIN — INSULIN LISPRO 2 UNITS: 100 INJECTION, SOLUTION INTRAVENOUS; SUBCUTANEOUS at 13:14

## 2024-10-27 RX ADMIN — CARVEDILOL 12.5 MG: 6.25 TABLET, FILM COATED ORAL at 10:57

## 2024-10-27 RX ADMIN — ALBUTEROL SULFATE 2.5 MG: 2.5 SOLUTION RESPIRATORY (INHALATION) at 07:40

## 2024-10-27 RX ADMIN — VANCOMYCIN HYDROCHLORIDE 750 MG: 750 INJECTION, POWDER, LYOPHILIZED, FOR SOLUTION INTRAVENOUS at 01:21

## 2024-10-27 RX ADMIN — MINERAL OIL 5 ML: 1000 LIQUID ORAL at 21:09

## 2024-10-27 RX ADMIN — ENOXAPARIN SODIUM 40 MG: 100 INJECTION SUBCUTANEOUS at 08:01

## 2024-10-27 RX ADMIN — CARVEDILOL 12.5 MG: 6.25 TABLET, FILM COATED ORAL at 21:09

## 2024-10-27 RX ADMIN — Medication 10 ML: at 21:08

## 2024-10-27 RX ADMIN — INSULIN LISPRO 3 UNITS: 100 INJECTION, SOLUTION INTRAVENOUS; SUBCUTANEOUS at 17:45

## 2024-10-27 RX ADMIN — ONDANSETRON 4 MG: 2 INJECTION INTRAMUSCULAR; INTRAVENOUS at 18:00

## 2024-10-27 RX ADMIN — BRIMONIDINE TARTRATE 1 DROP: 2 SOLUTION/ DROPS OPHTHALMIC at 21:09

## 2024-10-27 RX ADMIN — TIMOLOL MALEATE 1 DROP: 5 SOLUTION/ DROPS OPHTHALMIC at 21:09

## 2024-10-27 RX ADMIN — VALPROIC ACID 150 MG: 250 SOLUTION ORAL at 13:59

## 2024-10-27 RX ADMIN — VALPROIC ACID 150 MG: 250 SOLUTION ORAL at 05:28

## 2024-10-27 RX ADMIN — MINERAL OIL 5 ML: 1000 LIQUID ORAL at 13:14

## 2024-10-27 RX ADMIN — MINERAL OIL 5 ML: 1000 LIQUID ORAL at 08:03

## 2024-10-27 RX ADMIN — ALBUTEROL SULFATE 2.5 MG: 2.5 SOLUTION RESPIRATORY (INHALATION) at 12:32

## 2024-10-27 RX ADMIN — AMLODIPINE BESYLATE 10 MG: 10 TABLET ORAL at 10:57

## 2024-10-27 RX ADMIN — MINERAL OIL 5 ML: 1000 LIQUID ORAL at 17:45

## 2024-10-27 RX ADMIN — QUETIAPINE FUMARATE 25 MG: 25 TABLET ORAL at 21:10

## 2024-10-27 RX ADMIN — PIPERACILLIN AND TAZOBACTAM 3.38 G: 3; .375 INJECTION, POWDER, LYOPHILIZED, FOR SOLUTION INTRAVENOUS at 17:44

## 2024-10-27 RX ADMIN — VALPROIC ACID 150 MG: 250 SOLUTION ORAL at 21:10

## 2024-10-27 RX ADMIN — BRIMONIDINE TARTRATE 1 DROP: 2 SOLUTION/ DROPS OPHTHALMIC at 17:45

## 2024-10-27 RX ADMIN — HYDRALAZINE HYDROCHLORIDE 100 MG: 50 TABLET ORAL at 21:10

## 2024-10-27 RX ADMIN — PIPERACILLIN AND TAZOBACTAM 3.38 G: 3; .375 INJECTION, POWDER, LYOPHILIZED, FOR SOLUTION INTRAVENOUS at 10:57

## 2024-10-27 RX ADMIN — HYDRALAZINE HYDROCHLORIDE 100 MG: 50 TABLET ORAL at 05:28

## 2024-10-27 RX ADMIN — TERAZOSIN HYDROCHLORIDE 5 MG: 5 CAPSULE ORAL at 08:01

## 2024-10-27 RX ADMIN — ARIPIPRAZOLE 5 MG: 5 TABLET ORAL at 08:00

## 2024-10-27 RX ADMIN — BRIMONIDINE TARTRATE 1 DROP: 2 SOLUTION/ DROPS OPHTHALMIC at 08:02

## 2024-10-27 RX ADMIN — INSULIN LISPRO 2 UNITS: 100 INJECTION, SOLUTION INTRAVENOUS; SUBCUTANEOUS at 05:33

## 2024-10-27 RX ADMIN — VANCOMYCIN HYDROCHLORIDE 750 MG: 750 INJECTION, POWDER, LYOPHILIZED, FOR SOLUTION INTRAVENOUS at 13:38

## 2024-10-27 RX ADMIN — TERAZOSIN HYDROCHLORIDE 5 MG: 5 CAPSULE ORAL at 21:10

## 2024-10-27 RX ADMIN — CLONIDINE HYDROCHLORIDE 0.2 MG: 0.1 TABLET ORAL at 05:29

## 2024-10-27 RX ADMIN — PIPERACILLIN AND TAZOBACTAM 3.38 G: 3; .375 INJECTION, POWDER, LYOPHILIZED, FOR SOLUTION INTRAVENOUS at 02:59

## 2024-10-27 RX ADMIN — INSULIN LISPRO 3 UNITS: 100 INJECTION, SOLUTION INTRAVENOUS; SUBCUTANEOUS at 00:19

## 2024-10-27 RX ADMIN — SENNOSIDES AND DOCUSATE SODIUM 2 TABLET: 50; 8.6 TABLET ORAL at 13:59

## 2024-10-27 RX ADMIN — TIMOLOL MALEATE 1 DROP: 5 SOLUTION/ DROPS OPHTHALMIC at 08:02

## 2024-10-27 RX ADMIN — FLUOXETINE HYDROCHLORIDE 20 MG: 20 SOLUTION ORAL at 08:01

## 2024-10-27 NOTE — PROGRESS NOTES
[facility] Medicine Services  PROGRESS NOTE    Patient Name: Omkar Nava  : 1955  MRN: [id number]    Date of Admission: 10/20/2024  Primary Care Physician: Alberto Dye MD    Subjective   Subjective     CC:  Vomiting    HPI:  Patient much more alert and conversational today.  Able to be oriented to name and date of birth which is patient's baseline.  Saturating well on room air today.  Denies any acute complaints or concerns.    Objective   Objective     Vital Signs:   Temp:  [97.5 °F (36.4 °C)-98.2 °F (36.8 °C)] 98 °F (36.7 °C)  Heart Rate:  [48-60] 55  Resp:  [14-18] 16  BP: (131-147)/(52-64) 135/56     Physical Exam  Constitutional:       General: He is not in acute distress.  Eyes:      Extraocular Movements: Extraocular movements intact.   Cardiovascular:      Rate and Rhythm: Normal rate.      Pulses: Normal pulses.   Pulmonary:      Effort: Pulmonary effort is normal. No respiratory distress.   Abdominal:      General: There is no distension.      Palpations: Abdomen is soft.      Tenderness: There is no abdominal tenderness. There is no guarding or rebound.   Musculoskeletal:      Right lower leg: No edema.      Left lower leg: No edema.   Skin:     General: Skin is warm.   Neurological:      Mental Status: Mental status is at baseline.          Results Reviewed:  LAB RESULTS:      Lab 10/27/24  0850 10/26/24  1206 10/25/24  2219 10/25/24  0943 10/24/24  0915 10/23/24  0943 10/22/24  1317 10/20/24  2049   WBC 7.53  --  9.12 10.34 15.34* 14.01* 5.80 8.28   HEMOGLOBIN 8.0*  --  8.1* 7.8* 8.6* 8.5* 8.4* 8.2*   HEMATOCRIT 24.1*  --  24.4* 23.8* 26.0* 25.7* 26.3* 25.3*   PLATELETS 209  --  196 178 206 221 206 232   NEUTROS ABS  --   --  7.26* 7.95* 12.42* 11.60* 3.80 6.79   IMMATURE GRANS (ABS)  --   --  0.07* 0.09* 0.11* 0.07* 0.01 0.05   LYMPHS ABS  --   --  1.06 1.35 1.50 1.19 1.30 0.99   MONOS ABS  --   --  0.59 0.75 1.13* 1.11* 0.57 0.43   EOS ABS  --   --  0.12 0.18  0.16 0.02 0.11 0.01   MCV 93.8  --  93.1 92.2 93.5 93.1 92.6 94.8   PROCALCITONIN 0.14 0.16  --   --   --   --  0.05 0.13   LACTATE  --   --  1.0  --   --   --   --  0.7         Lab 10/27/24  0850 10/25/24  0943 10/24/24  0915 10/23/24  0943 10/21/24  1136   SODIUM 138 140 140 142 143   POTASSIUM 4.5 4.4 5.0 4.6 5.0   CHLORIDE 102 102 105 107 110*   CO2 27.0 28.0 27.0 25.0 23.0   ANION GAP 9.0 10.0 8.0 10.0 10.0   BUN 29* 34* 36* 33* 30*   CREATININE 1.03 0.94 1.01 1.25 1.12   EGFR 79.6 88.9 81.5 63.1 72.0   GLUCOSE 101* 171* 181* 153* 138*   CALCIUM 8.7 8.7 9.3 8.8 9.5         Lab 10/23/24  0943 10/20/24  2049   TOTAL PROTEIN 5.9* 7.6   ALBUMIN 3.2* 4.0   GLOBULIN 2.7 3.6   ALT (SGPT) 22 31   AST (SGOT) 13 22   BILIRUBIN <0.2 <0.2   ALK PHOS 92 97   LIPASE  --  18                     Lab 10/22/24  1325   FIO2 28   CARBOXYHEMOGLOBIN (VENOUS) 1.0     Brief Urine Lab Results  (Last result in the past 365 days)        Color   Clarity   Blood   Leuk Est   Nitrite   Protein   CREAT   Urine HCG        10/20/24 2123 Yellow   Clear   Negative   Negative   Negative   100 mg/dL (2+)                   Microbiology Results Abnormal       Procedure Component Value - Date/Time    Blood Culture - Blood, Hand, Right [[id number]]  (Normal) Collected: 10/25/24 2000    Lab Status: Preliminary result Specimen: Blood from Hand, Right Updated: 10/27/24 0015     Blood Culture No growth at 24 hours    Narrative:      Aerobic Bottle Only      Blood Culture - Blood, Hand, Left [[id number]]  (Normal) Collected: 10/25/24 2000    Lab Status: Preliminary result Specimen: Blood from Hand, Left Updated: 10/27/24 0001     Blood Culture No growth at 24 hours    Narrative:      Aerobic Bottle Only    Less than seven (7) mL's of blood was collected.  Insufficient quantity may yield false negative results.    Blood Culture - Blood, Arm, Right [[id number]]  (Normal) Collected: 10/21/24 0107    Lab Status: Final result Specimen: Blood from Arm, Right  Updated: 10/26/24 0215     Blood Culture No growth at 5 days    Blood Culture - Blood, Hand, Right [[id number]]  (Normal) Collected: 10/21/24 0107    Lab Status: Final result Specimen: Blood from Hand, Right Updated: 10/26/24 0215     Blood Culture No growth at 5 days            XR Chest 1 View    Result Date: 10/26/2024  XR CHEST 1 VW Date of Exam: 10/26/2024 8:18 AM EDT Indication: Hypothermic overnight, infectious workup Comparison: CT chest 10/20/2024, AP chest x-ray 3/31/2024 Findings: The patient is rotated. Airspace opacities appear decreased compared to most recent chest x-ray from 3/31/2024, but there are bibasilar airspace opacities, right greater than left. No pneumothorax or large pleural effusion is seen. Cardiomediastinal contours are stable.     Impression: Impression: Bibasilar airspace opacities, right greater than left, which may be due to atelectasis and/or pneumonia. Electronically Signed: Agnes Freed  10/26/2024 9:41 AM EDT  Workstation ID: [id number]     Results for orders placed during the hospital encounter of 01/15/24    Adult Transthoracic Echo Complete With Contrast if Necessary Per Protocol    Interpretation Summary    Left ventricular ejection fraction appears to be 56 - 60%.    Left ventricular wall thickness is consistent with hypertrophy.    Estimated right ventricular systolic pressure from tricuspid regurgitation is mildly elevated (35-45 mmHg).    There is a small (1-2cm) pericardial effusion.    No evidence for pericardial tamponade      Current medications:  Scheduled Meds:[START ON 10/28/2024] !Vancomycin Level Draw Needed, , Does not apply, Once  albuterol, 2.5 mg, Nebulization, Q6H - RT  amLODIPine, 10 mg, Per PEG Tube, Q24H  ARIPiprazole, 5 mg, Per G Tube, Daily  brimonidine, 1 drop, Both Eyes, TID  carvedilol, 12.5 mg, Per PEG Tube, Q12H  cloNIDine, 0.2 mg, Per PEG Tube, Q8H  enoxaparin, 40 mg, Subcutaneous, Daily  FLUoxetine, 20 mg, Per PEG Tube, Daily  hydrALAZINE, 100 mg,  Per PEG Tube, Q8H  insulin lispro, 2-7 Units, Subcutaneous, Q6H  lansoprazole, 30 mg, Per PEG Tube, Q AM  palliative care oral rinse, 5 mL, Mouth/Throat, 4x Daily  piperacillin-tazobactam, 3.375 g, Intravenous, Q8H  QUEtiapine, 25 mg, Per PEG Tube, Nightly  sodium chloride, 10 mL, Intravenous, Q12H  terazosin, 5 mg, Per G Tube, Q12H  timolol, 1 drop, Both Eyes, BID  Valproic Acid, 150 mg, Nasogastric, Q8H  vancomycin, 750 mg, Intravenous, Q12H      Continuous Infusions:Pharmacy to dose vancomycin,       PRN Meds:.  acetaminophen **OR** acetaminophen **OR** acetaminophen    senna-docusate sodium **AND** polyethylene glycol **AND** [DISCONTINUED] bisacodyl **AND** bisacodyl    dextrose    glucagon (human recombinant)    hydrOXYzine    Magnesium Standard Dose Replacement - Follow Nurse / BPA Driven Protocol    nitroglycerin    [DISCONTINUED] ondansetron ODT **OR** ondansetron    Pharmacy to dose vancomycin    Phosphorus Replacement - Follow Nurse / BPA Driven Protocol    Potassium Replacement - Follow Nurse / BPA Driven Protocol    sodium chloride    sodium chloride    Assessment & Plan   Assessment & Plan     Active Hospital Problems    Diagnosis  POA    **UTI (urinary tract infection) [N39.0]  Yes    Hypothermia [T68.XXXA]  Yes    Acute encephalopathy [G93.40]  Yes    Vomiting [R11.10]  Yes    Severe vascular dementia without behavioral disturbance, psychotic disturbance, mood disturbance, or anxiety [F01.C0]  Yes    Protein calorie malnutrition [E46]  Yes    S/P transmetatarsal amputation of foot, left [Z89.432]  Not Applicable    Anemia of chronic disease [D63.8]  Yes    Neurocognitive disorder [R41.9]  Yes    Type 2 diabetes mellitus [E11.9]  Yes    Essential hypertension [I10]  Yes    Hypertensive heart and chronic kidney disease 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 failure, with stage 1 through stage 4 chronic kidney disease, or unspecified chronic kidney disease [I13.10]  Yes    Long term (current) use of insulin [Z79.4]  Not  Applicable      Resolved Hospital Problems   No resolved problems to display.        Brief Hospital Course to date:  Omkar Nava is a 69 y.o. male with a PMHx of hypertension, diabetes, chronic kidney disease, vascular dementia, dysphagia, and chronic iron def anemia admitted with increased vomiting, with concern for possible underlying infection. Found to be positive for Rhinovirus and UTI.  The patient initially on amoxicillin for UTI.  Overnight on 10/25 through 10/26 patient became hypothermic with a T minimum of 92 degrees, requiring external warming.  Chest x-ray revealed bibasilar opacities. Antibiotics broadened on 10/26.     Postviral pneumonia versus HAP  - On 10/25, patient became hypothermic requiring external warming (Tmin 92)  - Infectious workup obtained including blood cultures and chest x-ray  - Currently on amoxicillin for UTI  - Chest x-ray revealing bibasilar opacities, though decreased from previous chest xrays  - MRSA nares positive  - No leukocytosis, procal 0.05 -> 0.16, lactate 1.0  PLAN  - Patient much more alert/conversational after starting abx yesterday  - Continue Vanc and Zosyn for now. Consider discontinuing abx if repeat blood cultures remain with no growth.   - Follow up blood cultures      Acute encephalopathy likely secondary to infection  Initial UTI/viral pneumonia  - UA with 4+ bacteria and urine cultures growing Enterococcus faecalis.    - Respiratory panel positive for rhinovirus  - Abx per above     Hyperkalemia, resolved  - s/p lokelma     JAYY on CKD-stage 2, resolved   - Baseline creatinine near 1.1-1.2  - Cr near baseline  - Status post IVF     Chronic aspiration  - PEG tube in place  - Nutrition to recommend TF  - Pt with pureed foods, nectar thick liquids per daughter.--although this is different from SLP notes from last admission  - SLP consulted     Chronic anemia  - At baseline, cont to monitor.     HFpEF  HTN  - Echo with EF of 56 to 60%  - Continue carvedilol,  clonidine, terazosin, hydralazine  - Hold diuretics     T2DM  - A1c 5.6 last hospitalization  - Due to poor intake, hold levemir for now.  Cover with SSI.     Dementia with agitation  Questionable seizure disorder  - Seroquel, Depakote, Abilify, prozac    Expected Discharge Location and Transportation: [facility]  Expected Discharge   Expected Discharge Date: 10/26/2024; Expected Discharge Time:      VTE Prophylaxis:  Pharmacologic VTE prophylaxis orders are present.         AM-PAC 6 Clicks Score (PT): 6 (10/27/24 0800)    CODE STATUS:   Code Status and Medical Interventions: CPR (Attempt to Resuscitate); Full Support   Ordered at: 10/21/24 0146     Level Of Support Discussed With:    Health Care Surrogate     Code Status (Patient has no pulse and is not breathing):    CPR (Attempt to Resuscitate)     Medical Interventions (Patient has pulse or is breathing):    Full Support       Ryne Burgos DO  10/27/24

## 2024-10-27 NOTE — PLAN OF CARE
Problem: Adult Inpatient Plan of Care  Goal: Plan of Care Review  Outcome: Progressing  Goal: Patient-Specific Goal (Individualized)  Outcome: Progressing  Goal: Absence of Hospital-Acquired Illness or Injury  Outcome: Progressing  Intervention: Identify and Manage Fall Risk  Intervention: Prevent Skin Injury  Intervention: Prevent Infection  Goal: Optimal Comfort and Wellbeing  Outcome: Progressing  Intervention: Provide Person-Centered Care  Goal: Readiness for Transition of Care  Outcome: Progressing     Problem: Skin Injury Risk Increased  Goal: Skin Health and Integrity  Outcome: Progressing  Intervention: Optimize Skin Protection     Problem: Fall Injury Risk  Goal: Absence of Fall and Fall-Related Injury  Outcome: Progressing  Intervention: Identify and Manage Contributors  Intervention: Promote Injury-Free Environment   Goal Outcome Evaluation:  Plan of Care Reviewed With: patient

## 2024-10-28 LAB
ANION GAP SERPL CALCULATED.3IONS-SCNC: 10 MMOL/L (ref 5–15)
BUN SERPL-MCNC: 28 MG/DL (ref 8–23)
BUN/CREAT SERPL: 25.5 (ref 7–25)
CALCIUM SPEC-SCNC: 8.4 MG/DL (ref 8.6–10.5)
CHLORIDE SERPL-SCNC: 102 MMOL/L (ref 98–107)
CO2 SERPL-SCNC: 24 MMOL/L (ref 22–29)
CREAT SERPL-MCNC: 1.1 MG/DL (ref 0.76–1.27)
DEPRECATED RDW RBC AUTO: 50.3 FL (ref 37–54)
EGFRCR SERPLBLD CKD-EPI 2021: 73.6 ML/MIN/1.73
ERYTHROCYTE [DISTWIDTH] IN BLOOD BY AUTOMATED COUNT: 14.6 % (ref 12.3–15.4)
GLUCOSE BLDC GLUCOMTR-MCNC: 173 MG/DL (ref 70–130)
GLUCOSE BLDC GLUCOMTR-MCNC: 221 MG/DL (ref 70–130)
GLUCOSE BLDC GLUCOMTR-MCNC: 231 MG/DL (ref 70–130)
GLUCOSE BLDC GLUCOMTR-MCNC: 261 MG/DL (ref 70–130)
GLUCOSE SERPL-MCNC: 186 MG/DL (ref 65–99)
HCT VFR BLD AUTO: 25 % (ref 37.5–51)
HGB BLD-MCNC: 8.2 G/DL (ref 13–17.7)
MCH RBC QN AUTO: 30.7 PG (ref 26.6–33)
MCHC RBC AUTO-ENTMCNC: 32.8 G/DL (ref 31.5–35.7)
MCV RBC AUTO: 93.6 FL (ref 79–97)
PLATELET # BLD AUTO: 245 10*3/MM3 (ref 140–450)
PMV BLD AUTO: 11.3 FL (ref 6–12)
POTASSIUM SERPL-SCNC: 5.4 MMOL/L (ref 3.5–5.2)
POTASSIUM SERPL-SCNC: 5.5 MMOL/L (ref 3.5–5.2)
PROCALCITONIN SERPL-MCNC: 0.12 NG/ML (ref 0–0.25)
RBC # BLD AUTO: 2.67 10*6/MM3 (ref 4.14–5.8)
SODIUM SERPL-SCNC: 136 MMOL/L (ref 136–145)
VANCOMYCIN TROUGH SERPL-MCNC: 16 MCG/ML (ref 5–20)
WBC NRBC COR # BLD AUTO: 10.38 10*3/MM3 (ref 3.4–10.8)

## 2024-10-28 PROCEDURE — 25010000002 PIPERACILLIN SOD-TAZOBACTAM PER 1 G: Performed by: STUDENT IN AN ORGANIZED HEALTH CARE EDUCATION/TRAINING PROGRAM

## 2024-10-28 PROCEDURE — 80048 BASIC METABOLIC PNL TOTAL CA: CPT

## 2024-10-28 PROCEDURE — 63710000001 INSULIN LISPRO (HUMAN) PER 5 UNITS: Performed by: STUDENT IN AN ORGANIZED HEALTH CARE EDUCATION/TRAINING PROGRAM

## 2024-10-28 PROCEDURE — 92610 EVALUATE SWALLOWING FUNCTION: CPT | Performed by: SPEECH-LANGUAGE PATHOLOGIST

## 2024-10-28 PROCEDURE — 82948 REAGENT STRIP/BLOOD GLUCOSE: CPT

## 2024-10-28 PROCEDURE — 94761 N-INVAS EAR/PLS OXIMETRY MLT: CPT

## 2024-10-28 PROCEDURE — 84145 PROCALCITONIN (PCT): CPT | Performed by: STUDENT IN AN ORGANIZED HEALTH CARE EDUCATION/TRAINING PROGRAM

## 2024-10-28 PROCEDURE — 85027 COMPLETE CBC AUTOMATED: CPT

## 2024-10-28 PROCEDURE — 94799 UNLISTED PULMONARY SVC/PX: CPT

## 2024-10-28 PROCEDURE — 25010000002 VANCOMYCIN 750 MG RECONSTITUTED SOLUTION 1 EACH VIAL

## 2024-10-28 PROCEDURE — 25810000003 SODIUM CHLORIDE 0.9 % SOLUTION 250 ML FLEX CONT

## 2024-10-28 PROCEDURE — 94664 DEMO&/EVAL PT USE INHALER: CPT

## 2024-10-28 PROCEDURE — 99232 SBSQ HOSP IP/OBS MODERATE 35: CPT | Performed by: FAMILY MEDICINE

## 2024-10-28 PROCEDURE — 84132 ASSAY OF SERUM POTASSIUM: CPT | Performed by: FAMILY MEDICINE

## 2024-10-28 PROCEDURE — 25010000002 ENOXAPARIN PER 10 MG: Performed by: PEDIATRICS

## 2024-10-28 PROCEDURE — 80202 ASSAY OF VANCOMYCIN: CPT

## 2024-10-28 RX ADMIN — PIPERACILLIN AND TAZOBACTAM 3.38 G: 3; .375 INJECTION, POWDER, LYOPHILIZED, FOR SOLUTION INTRAVENOUS at 02:24

## 2024-10-28 RX ADMIN — Medication 10 ML: at 09:22

## 2024-10-28 RX ADMIN — MINERAL OIL 5 ML: 1000 LIQUID ORAL at 09:22

## 2024-10-28 RX ADMIN — QUETIAPINE FUMARATE 25 MG: 25 TABLET ORAL at 21:30

## 2024-10-28 RX ADMIN — CARVEDILOL 12.5 MG: 6.25 TABLET, FILM COATED ORAL at 09:22

## 2024-10-28 RX ADMIN — INSULIN LISPRO 3 UNITS: 100 INJECTION, SOLUTION INTRAVENOUS; SUBCUTANEOUS at 00:12

## 2024-10-28 RX ADMIN — CARVEDILOL 12.5 MG: 6.25 TABLET, FILM COATED ORAL at 21:30

## 2024-10-28 RX ADMIN — HYDRALAZINE HYDROCHLORIDE 100 MG: 50 TABLET ORAL at 05:27

## 2024-10-28 RX ADMIN — VALPROIC ACID 150 MG: 250 SOLUTION ORAL at 21:28

## 2024-10-28 RX ADMIN — TERAZOSIN HYDROCHLORIDE 5 MG: 5 CAPSULE ORAL at 09:21

## 2024-10-28 RX ADMIN — PIPERACILLIN AND TAZOBACTAM 3.38 G: 3; .375 INJECTION, POWDER, LYOPHILIZED, FOR SOLUTION INTRAVENOUS at 12:50

## 2024-10-28 RX ADMIN — VALPROIC ACID 150 MG: 250 SOLUTION ORAL at 15:05

## 2024-10-28 RX ADMIN — LANSOPRAZOLE 30 MG: 15 TABLET, ORALLY DISINTEGRATING ORAL at 05:27

## 2024-10-28 RX ADMIN — ALBUTEROL SULFATE 2.5 MG: 2.5 SOLUTION RESPIRATORY (INHALATION) at 07:55

## 2024-10-28 RX ADMIN — ALBUTEROL SULFATE 2.5 MG: 2.5 SOLUTION RESPIRATORY (INHALATION) at 13:47

## 2024-10-28 RX ADMIN — INSULIN LISPRO 2 UNITS: 100 INJECTION, SOLUTION INTRAVENOUS; SUBCUTANEOUS at 05:36

## 2024-10-28 RX ADMIN — HYDRALAZINE HYDROCHLORIDE 100 MG: 50 TABLET ORAL at 15:05

## 2024-10-28 RX ADMIN — CLONIDINE HYDROCHLORIDE 0.2 MG: 0.1 TABLET ORAL at 15:04

## 2024-10-28 RX ADMIN — CLONIDINE HYDROCHLORIDE 0.2 MG: 0.1 TABLET ORAL at 05:27

## 2024-10-28 RX ADMIN — MINERAL OIL 5 ML: 1000 LIQUID ORAL at 12:50

## 2024-10-28 RX ADMIN — CLONIDINE HYDROCHLORIDE 0.2 MG: 0.1 TABLET ORAL at 21:30

## 2024-10-28 RX ADMIN — BRIMONIDINE TARTRATE 1 DROP: 2 SOLUTION/ DROPS OPHTHALMIC at 09:22

## 2024-10-28 RX ADMIN — TIMOLOL MALEATE 1 DROP: 5 SOLUTION/ DROPS OPHTHALMIC at 09:22

## 2024-10-28 RX ADMIN — MINERAL OIL 5 ML: 1000 LIQUID ORAL at 21:29

## 2024-10-28 RX ADMIN — BRIMONIDINE TARTRATE 1 DROP: 2 SOLUTION/ DROPS OPHTHALMIC at 15:05

## 2024-10-28 RX ADMIN — BRIMONIDINE TARTRATE 1 DROP: 2 SOLUTION/ DROPS OPHTHALMIC at 21:31

## 2024-10-28 RX ADMIN — HYDRALAZINE HYDROCHLORIDE 100 MG: 50 TABLET ORAL at 21:29

## 2024-10-28 RX ADMIN — ARIPIPRAZOLE 5 MG: 5 TABLET ORAL at 09:21

## 2024-10-28 RX ADMIN — VANCOMYCIN HYDROCHLORIDE 750 MG: 750 INJECTION, POWDER, LYOPHILIZED, FOR SOLUTION INTRAVENOUS at 13:15

## 2024-10-28 RX ADMIN — VALPROIC ACID 150 MG: 250 SOLUTION ORAL at 05:27

## 2024-10-28 RX ADMIN — FLUOXETINE HYDROCHLORIDE 20 MG: 20 SOLUTION ORAL at 09:22

## 2024-10-28 RX ADMIN — TERAZOSIN HYDROCHLORIDE 5 MG: 5 CAPSULE ORAL at 21:29

## 2024-10-28 RX ADMIN — INSULIN LISPRO 3 UNITS: 100 INJECTION, SOLUTION INTRAVENOUS; SUBCUTANEOUS at 12:57

## 2024-10-28 RX ADMIN — ENOXAPARIN SODIUM 40 MG: 100 INJECTION SUBCUTANEOUS at 09:22

## 2024-10-28 RX ADMIN — TIMOLOL MALEATE 1 DROP: 5 SOLUTION/ DROPS OPHTHALMIC at 21:31

## 2024-10-28 RX ADMIN — HYDROXYZINE HYDROCHLORIDE 25 MG: 25 TABLET ORAL at 12:56

## 2024-10-28 RX ADMIN — VANCOMYCIN HYDROCHLORIDE 750 MG: 750 INJECTION, POWDER, LYOPHILIZED, FOR SOLUTION INTRAVENOUS at 00:12

## 2024-10-28 RX ADMIN — PIPERACILLIN AND TAZOBACTAM 3.38 G: 3; .375 INJECTION, POWDER, LYOPHILIZED, FOR SOLUTION INTRAVENOUS at 18:41

## 2024-10-28 RX ADMIN — ALBUTEROL SULFATE 2.5 MG: 2.5 SOLUTION RESPIRATORY (INHALATION) at 20:36

## 2024-10-28 RX ADMIN — AMLODIPINE BESYLATE 10 MG: 10 TABLET ORAL at 09:21

## 2024-10-28 RX ADMIN — SODIUM ZIRCONIUM CYCLOSILICATE 10 G: 10 POWDER, FOR SUSPENSION ORAL at 18:41

## 2024-10-28 RX ADMIN — INSULIN LISPRO 4 UNITS: 100 INJECTION, SOLUTION INTRAVENOUS; SUBCUTANEOUS at 18:40

## 2024-10-28 RX ADMIN — Medication 10 ML: at 21:31

## 2024-10-28 RX ADMIN — MINERAL OIL 5 ML: 1000 LIQUID ORAL at 18:40

## 2024-10-28 NOTE — PROGRESS NOTES
Pharmacy Consult - Vancomycin Dosing and Monitoring    Omkar Nava is a 69 y.o. male receiving vancomycin therapy.     Indication: PNA--HAP  Consulting Provider: hospitalist  ID Consult: N    Goal AUC: 400-600 mg/L*hr    Current Antimicrobial Therapy  Anti-Infectives (From admission, onward)      Ordered     Dose/Rate Route Frequency Start Stop    10/26/24 1149  ! Vancomycin trough on 10/28. Please hold 1300 dose on 10/28 until pharmacy evaluates level.        Ordering Provider: Hero yT, PharmD     Does not apply Once 10/28/24 1200      10/26/24 1149  vancomycin 750 mg in sodium chloride 0.9 % 250 mL IVPB-VTB        Ordering Provider: Hero Ty, RenateD    750 mg  333.3 mL/hr over 45 Minutes Intravenous Every 12 Hours 10/27/24 0100 10/31/24 1259    10/26/24 1134  piperacillin-tazobactam (ZOSYN) 3.375 g IVPB in 100 mL NS MBP (CD)        Ordering Provider: Ryne Burgos DO    3.375 g  over 4 Hours Intravenous Every 8 Hours 10/26/24 1830 10/31/24 1829    10/26/24 1149  vancomycin IVPB 1500 mg in 0.9% NaCl (Premix) 500 mL        Ordering Provider: Hero Ty, PharmD    1,500 mg  333.3 mL/hr over 90 Minutes Intravenous Once 10/26/24 1245 10/26/24 1440    10/26/24 1134  piperacillin-tazobactam (ZOSYN) 3.375 g IVPB in 100 mL NS MBP (CD)        Ordering Provider: Ryne Burgos DO    3.375 g  over 30 Minutes Intravenous Once 10/26/24 1230 10/26/24 1225    10/26/24 1134  Pharmacy to dose vancomycin        Ordering Provider: Ryne Burgos DO     Does not apply Continuous PRN 10/26/24 1132 10/31/24 1131    10/20/24 2318  doxycycline (VIBRAMYCIN) 100 mg in sodium chloride 0.9 % 100 mL MBP        Ordering Provider: Taryn Stover MD    100 mg  over 60 Minutes Intravenous Once 10/20/24 2334 10/21/24 0543    10/20/24 2249  cefTRIAXone (ROCEPHIN) 2,000 mg in sodium chloride 0.9 % 100 mL MBP        Ordering Provider: Taryn Stvoer MD    2,000 mg  200 mL/hr over 30 Minutes Intravenous Once 10/20/24 8029  "10/21/24 0400          Allergies  Allergies as of 10/20/2024    (No Known Allergies)     Labs  Results from last 7 days   Lab Units 10/25/24  0943 10/24/24  0915 10/23/24  0943   BUN mg/dL 34* 36* 33*   CREATININE mg/dL 0.94 1.01 1.25     Results from last 7 days   Lab Units 10/25/24  2219 10/25/24  0943 10/24/24  0915   WBC 10*3/mm3 9.12 10.34 15.34*     Evaluation of Dosing     Last Dose Received in the ED/Outside Facility: N  Is Patient on Dialysis or Renal Replacement: N    Height - 172.7 cm (68\")  Weight - 77 kg (169 lb 12.8 oz)    Estimated Creatinine Clearance: 83.1 mL/min (by C-G formula based on SCr of 0.94 mg/dL).    I/O last 3 completed shifts:  In: 1662 [Other:297; NG/GT:1365]  Out: 2050 [Urine:2050]    Microbiology and Radiology  Microbiology Results (last 10 days)       Procedure Component Value - Date/Time    MRSA Screen, PCR (Inpatient) - Swab, Nares [[id number]]  (Abnormal) Collected: 10/26/24 1208    Lab Status: Final result Specimen: Swab from Nares Updated: 10/26/24 1408     MRSA PCR Positive    Narrative:      The negative predictive value of this diagnostic test is high and should only be used to consider de-escalating anti-MRSA therapy. A positive result may indicate colonization with MRSA and must be correlated clinically.    Blood Culture - Blood, Hand, Left [[id number]]  (Normal) Collected: 10/25/24 2000    Lab Status: Preliminary result Specimen: Blood from Hand, Left Updated: 10/27/24 0001     Blood Culture No growth at 24 hours    Narrative:      Aerobic Bottle Only    Less than seven (7) mL's of blood was collected.  Insufficient quantity may yield false negative results.    Blood Culture - Blood, Hand, Right [[id number]]  (Normal) Collected: 10/25/24 2000    Lab Status: Preliminary result Specimen: Blood from Hand, Right Updated: 10/27/24 0015     Blood Culture No growth at 24 hours    Narrative:      Aerobic Bottle Only      Respiratory Panel PCR w/COVID-19(SARS-CoV-2) " GIANNI/SIENNA/ANNA/PAD/COR/ASHIA In-House, NP Swab in UTM/VTM, 2 HR TAT - Swab, Nasopharynx [[id number]]  (Abnormal) Collected: 10/22/24 1112    Lab Status: Final result Specimen: Swab from Nasopharynx Updated: 10/22/24 1228     ADENOVIRUS, PCR Not Detected     Coronavirus 229E Not Detected     Coronavirus HKU1 Not Detected     Coronavirus NL63 Not Detected     Coronavirus OC43 Not Detected     COVID19 Not Detected     Human Metapneumovirus Not Detected     Human Rhinovirus/Enterovirus Detected     Influenza A PCR Not Detected     Influenza B PCR Not Detected     Parainfluenza Virus 1 Not Detected     Parainfluenza Virus 2 Not Detected     Parainfluenza Virus 3 Not Detected     Parainfluenza Virus 4 Not Detected     RSV, PCR Not Detected     Bordetella pertussis pcr Not Detected     Bordetella parapertussis PCR Not Detected     Chlamydophila pneumoniae PCR Not Detected     Mycoplasma pneumo by PCR Not Detected    Narrative:      In the setting of a positive respiratory panel with a viral infection PLUS a negative procalcitonin without other underlying concern for bacterial infection, consider observing off antibiotics or discontinuation of antibiotics and continue supportive care. If the respiratory panel is positive for atypical bacterial infection (Bordetella pertussis, Chlamydophila pneumoniae, or Mycoplasma pneumoniae), consider antibiotic de-escalation to target atypical bacterial infection.    Blood Culture - Blood, Arm, Right [[id number]]  (Normal) Collected: 10/21/24 0107    Lab Status: Final result Specimen: Blood from Arm, Right Updated: 10/26/24 0215     Blood Culture No growth at 5 days    Blood Culture - Blood, Hand, Right [[id number]]  (Normal) Collected: 10/21/24 0107    Lab Status: Final result Specimen: Blood from Hand, Right Updated: 10/26/24 0215     Blood Culture No growth at 5 days    Urine Culture - Urine, Straight Cath [[id number]]  (Abnormal)  (Susceptibility) Collected: 10/20/24 2123    Lab Status: Final  result Specimen: Urine from Straight Cath Updated: 10/23/24 0902     Urine Culture >100,000 CFU/mL Enterococcus faecalis    Narrative:      Colonization of the urinary tract without infection is common. Treatment is discouraged unless the patient is symptomatic, pregnant, or undergoing an invasive urologic procedure.    Susceptibility        Enterococcus faecalis      KB      Ampicillin <=2 ug/ml Susceptible      Levofloxacin 0.5 ug/ml Susceptible      Nitrofurantoin <=16 ug/ml Susceptible      Vancomycin 2 ug/ml Susceptible                                 Reported Vancomycin Levels              InsightRX AUC Calculation:    Current AUC: 425 mg/L*hr    Predicted Steady State AUC on Current Dose: 498 mg/L*hr  _________________________________    Assessment/Plan:     Pharmacy to dose vancomycin for HAP. Goal AUC: 400-600 mg/L*hr.  Patient currently receiving vancomycin 750 mg IV Q12H.   Vancomycin level collected 10/28: 16 mcg/mL. This correlates to a predicted AUCss of 498 mg/L*hr.  Continue current dose of vancomycin 750 mg IV Q12H.  No further levels ordered at this time. Scr increased to 1.10 mg/dL, afebrile.  Pharmacy will continue to monitor cultures, sensitivities, renal function, and clinical status, and will adjust regimen as necessary.    All Fallon RPH  10/28/2024  11:19 EDT

## 2024-10-28 NOTE — PLAN OF CARE
Goal Outcome Evaluation:  Plan of Care Reviewed With: patient        Progress: no change       Anticipated Discharge Disposition (SLP): skilled nursing facility          SLP Swallowing Diagnosis: oral dysphagia, suspected pharyngeal dysphagia (10/28/24 1209)

## 2024-10-28 NOTE — PROGRESS NOTES
Clinical Nutrition     Patient Name: Omkar Nava  YOB: 1955  MRN: [id number]  Date of Encounter: 10/28/24 12:22 EDT  Admission date: 10/20/2024  Reason for Visit: Follow-up protocol, EN    Assessment   Nutrition Assessment   Admission Diagnosis:  UTI (urinary tract infection) [N39.0]  Vomiting [R11.10]  Acute encephalopathy [G93.40]    Problem List:    UTI (urinary tract infection)    Type 2 diabetes mellitus    Essential hypertension    Neurocognitive disorder    S/P transmetatarsal amputation of foot, left    Anemia of chronic disease    Hypertensive heart and chronic kidney disease 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 failure, with stage 1 through stage 4 chronic kidney disease, or unspecified chronic kidney disease    Long term (current) use of insulin    Protein calorie malnutrition    Severe vascular dementia without behavioral disturbance, psychotic disturbance, mood disturbance, or anxiety    Vomiting    Acute encephalopathy    Hypothermia      PMH:   He  has a past medical history of Anemia, Dementia, Diabetes mellitus, Dysphagia, GERD (gastroesophageal reflux disease), History of alcohol abuse, History of cocaine use, History of marijuana use, Hypertension, Osteomyelitis, Poor historian, and Visual impairment.    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    Applicable Nutrition History:   CKD 2  T2DM, insulin use  HFpEF  Seizure disorder  Vascular dementia w/ agitation  Dysphagia s/p PEG (4/2024)    (10/22)  SLP: refused MBS today, agreeable for tomorrow; puree, nectar thick liquids, long term alternate methods of nutrition/hydration   (10/23) SLP MBS: NPO, long term alternate methods of nutrition/hydration     EN regimen: modified to continuous  (10/24) SLP: NPO, long term alternate methods of  nutrition/hydration  (10/28) SLP: NPO, long term alternate methods of nutrition/hydration     Labs    Labs Reviewed: Yes    Results from last 7 days   Lab Units 10/28/24  0922 10/27/24  0850 10/25/24  2219 10/25/24  0943 10/24/24  0915 10/23/24  0943   GLUCOSE mg/dL 186* 101*  --  171*   < > 153*   BUN mg/dL 28* 29*  --  34*   < > 33*   CREATININE mg/dL 1.10 1.03  --  0.94   < > 1.25   SODIUM mmol/L 136 138  --  140   < > 142   CHLORIDE mmol/L 102 102  --  102   < > 107   POTASSIUM mmol/L 5.5* 4.5  --  4.4   < > 4.6   ALT (SGPT) U/L  --   --   --   --   --  22   LACTATE mmol/L  --   --  1.0  --   --   --     < > = values in this interval not displayed.     Results from last 7 days   Lab Units 10/23/24  0943   ALBUMIN g/dL 3.2*     Results from last 7 days   Lab Units 10/28/24  1157 10/28/24  0526 10/28/24  0002 10/27/24  1739 10/27/24  1222 10/27/24  0527   GLUCOSE mg/dL 231* 173* 221* 216* 189* 155*     Lab Results   Lab Value Date/Time    HGBA1C 5.60 09/15/2024 0658    HGBA1C 7.00 (H) 10/16/2022 0432    HGBA1C 8.7 (H) 06/25/2022 0242    HGBA1C 13.4 04/14/2022 1058     Medications    Medications Reviewed: yes    Scheduled Meds:albuterol, 2.5 mg, Nebulization, Q6H - RT  amLODIPine, 10 mg, Per PEG Tube, Q24H  ARIPiprazole, 5 mg, Per G Tube, Daily  brimonidine, 1 drop, Both Eyes, TID  carvedilol, 12.5 mg, Per PEG Tube, Q12H  cloNIDine, 0.2 mg, Per PEG Tube, Q8H  enoxaparin, 40 mg, Subcutaneous, Daily  FLUoxetine, 20 mg, Per PEG Tube, Daily  hydrALAZINE, 100 mg, Per PEG Tube, Q8H  insulin lispro, 2-7 Units, Subcutaneous, Q6H  lansoprazole, 30 mg, Per PEG Tube, Q AM  palliative care oral rinse, 5 mL, Mouth/Throat, 4x Daily  piperacillin-tazobactam, 3.375 g, Intravenous, Q8H  QUEtiapine, 25 mg, Per PEG Tube, Nightly  sodium chloride, 10 mL, Intravenous, Q12H  terazosin, 5 mg, Per G Tube, Q12H  timolol, 1 drop, Both Eyes, BID  Valproic Acid, 150 mg, Nasogastric, Q8H  vancomycin, 750 mg, Intravenous, Q12H      Continuous  "Infusions:Pharmacy to dose vancomycin,       PRN Meds:.  acetaminophen **OR** acetaminophen **OR** acetaminophen    senna-docusate sodium **AND** polyethylene glycol **AND** [DISCONTINUED] bisacodyl **AND** bisacodyl    dextrose    glucagon (human recombinant)    hydrOXYzine    Magnesium Standard Dose Replacement - Follow Nurse / BPA Driven Protocol    nitroglycerin    [DISCONTINUED] ondansetron ODT **OR** ondansetron    Pharmacy to dose vancomycin    Phosphorus Replacement - Follow Nurse / BPA Driven Protocol    Potassium Replacement - Follow Nurse / BPA Driven Protocol    sodium chloride    sodium chloride    Intake/Ouptut 24 hrs (0701 - 0700)   I&O's Reviewed: yes  Intake & Output (last day)         10/27 0701  10/28 0700 10/28 0701  10/29 0700    Other 282     NG/     Total Intake(mL/kg) 1239 (16.1)     Urine (mL/kg/hr) 900 (0.5) 800 (1.9)    Emesis/NG output 0     Total Output 900 800    Net +339 -800                (10/24) 1 small BM    Anthropometrics   Height: Height: 172.7 cm (68\")  Last Filed Weight: Weight: 77 kg (169 lb 12.8 oz) (10/21/24 1051)  Method: Weight Method: Bed scale  BMI: BMI (Calculated): 25.8    UBW:    Weight      Weight (kg) Weight (lbs) Weight Method   4/1/2024 90.7 kg  199 lb 15.3 oz  Bed scale    4/2/2024 92.1 kg  203 lb 0.7 oz  Bed scale    5/28/2024 79.379 kg  175 lb     5/30/2024 79.379 kg  175 lb     6/6/2024 79.379 kg  175 lb     9/11/2024 79.4 kg  175 lb 0.7 oz  Estimated    9/12/2024 86.229 kg  190 lb 1.6 oz  Bed scale    9/13/2024 83.462 kg  184 lb     9/14/2024 84.505 kg  186 lb 4.8 oz     9/15/2024 84.233 kg  185 lb 11.2 oz     9/18/2024 83.734 kg  184 lb 9.6 oz  Bed scale    10/20/2024 86.183 kg  190 lb  Estimated    10/21/2024 77.021 kg  169 lb 12.8 oz  Bed scale      Weight change: KAROLYN    Nutrition Focused Physical Exam     Date: 10/21/24     Unable to perform due to Pt unable to participate at time of visit     Subjective   Reported/Observed/Food/Nutrition Related " "History:   10/28/24  Patient continues to tolerate EN @ goal rate. No GI complaints noted. Recommend bowel stim regimen as patient without BM in 4 days.    10/24/24  Patient awake at time of visit. Confused conversation about paper plates on his bed and pizza that he wanted this writer to go \"downstairs\" to get him. He has been tolerating continuous feeds well. No noted GI complaints.    10/23/24  Patient failed MBS today with SLP. Will modify EN regimen to continuous to meet 100% of daily needs.    10/22/24  Patient would not stay awake long enough for interview today. Did say \"no\" when asked if he had any appetite. Also, stated \"I think so\" when asked if he had his tube feeds overnight. Spoke with assigned RN, who reported patient tolerated feeds overnight, however it was reported tube clogged several times? Patient said he wasn't feeling breakfast or lunch today. Also, has refused MBS today, rescheduled for tomorrow.    10/21/24   Attempted to interview patient with assigned RN at bedside, however, patient would not awaken to participate. Spoke with Marisol RN @ [facility] regarding patient's EN regimen. Reported his EN orders had been removed already, but from what she can recall he was on Glucerna, maybe @ 60ml/hr with 85ml/hr FWF x22hr and was receiving a pureed, thin liquid meal tray as well.    Needs Assessment   Date: 10/21/24, 10/23    Height used:Height: 172.7 cm (68\")  Weights used: 77kg    Estimated Calorie needs: ~ 1900 Kcal/day  Method: 25-30 Kcals/KG = 9738-0094  Method: 1.0-1.2 MSJ = 7486-7398    Estimated Protein needs: ~ 90g PRO/day  Method: 0.8-1.2g/Kg = 61-92g pro    Estimated Fluid needs: ~ ml/day   Per clinical status    Current Nutrition Prescription   PO: NPO Diet NPO Type: Strict NPO  Oral Nutrition Supplement: n/a  Intake: N/A    EN: DiabetiSource AC  Goal Rate: 70ml/hr  Water Flushes: 15ml/hr  Modular: None  Route: PEG  Tube: 20 PEG    EN @ goal over 22hrs to supply:  Goal Volume 1540 " mL/day     Flush Volume 330 mL/day     Energy 1848 Kcal/day 97 % Est Need   Protein 92 g/day 102 % Est Need   Fiber 23 g/day     Water in  EN 1263 mL     Total Water 1593 mL     Meet DRI Y        --------------------------------------------------------------------------  Product/Rate verified at bedside: no  Infusing Rate at time of visit: 70/15 per assigned RN    Average Delivery from [facility]:   Volume 927 mL/day   % Goal Vol.   Flush Volume 282 mL/day     Energy  Kcal/day  % Est Need   Protein  g/day  % Est Need   Fiber  g/day     Water in  EN  mL     Total Water  mL     Meet DRI         Assessment & Plan   Nutrition Diagnosis   Date: 10/21/24  Updated:   Problem Swallowing difficulty    Etiology Oropharyngeal dysphagia   Signs/Symptoms PEG and modified PO diet   Status: New    Goal:   Nutrition to support treatment and Maintain EN    Nutrition Intervention      Follow treatment progress, Care plan reviewed    Nutrition POC:  Continue continuous EN regimen at this time: Diabetisource AC @ 70ml/hr + 15ml/hr FWF x22hr  Will adjust EN/FWF PRN    Monitoring/Evaluation:   Per protocol, I&O, Pertinent labs, EN delivery/tolerance, Weight, GI status, Symptoms, POC/GOC, Swallow function    Marija oY MS,RD,LD  Time Spent: 30min

## 2024-10-28 NOTE — CASE MANAGEMENT/SOCIAL WORK
Continued Stay Note  [facility]     Patient Name: Omkar Nava  MRN: [id number]  Today's Date: 10/28/2024    Admit Date: 10/20/2024    Plan: [facility]   Discharge Plan       Row Name 10/28/24 1127       Plan    Plan [facility]    Patient/Family in Agreement with Plan yes    Plan Comments Discussed Mr. Nava in MDR. Awaiting ID consult and medical readiness to return to [facility]. CM will continue to follow up.    Final Discharge Disposition Code 04 - intermediate care facility                   Discharge Codes    No documentation.                 Expected Discharge Date and Time       Expected Discharge Date Expected Discharge Time    Oct 26, 2024               Jaki Felton RN

## 2024-10-28 NOTE — THERAPY RE-EVALUATION
Acute Care - Speech Language Pathology   Swallow Re-Evaluation [facility]     Patient Name: Omkar Nava  : 1955  MRN: [id number]  Today's Date: 10/28/2024               Admit Date: 10/20/2024    Visit Dx:     ICD-10-CM ICD-9-CM   1. Acute cystitis without hematuria  N30.00 595.0   2. Pneumonia due to infectious organism, unspecified laterality, unspecified part of lung  J18.9 486   3. 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, without long-term current use of insulin    Acute osteomyelitis of left foot    Type 2 diabetes mellitus    Essential hypertension    Psychotic disorder    Neurocognitive disorder    S/P transmetatarsal amputation of foot, left    Abscess of left foot    Anemia of chronic disease    Aspiration pneumonia    Cervical spine pain    Chronic kidney disease    Closed head injury without loss of consciousness    Dementia    Dental cavities    Diarrhea of presumed infectious origin    Displaced fracture of proximal phalanx of left great toe, initial encounter for open fracture    Dysphagia    Erectile dysfunction    Generalized muscle weakness    Hallucinations    Hypertensive heart and chronic kidney disease 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 failure, with stage 1 through stage 4 chronic kidney disease, or unspecified chronic kidney disease    Insomnia due to medical condition    Iron deficiency anemia    Laceration without foreign body, left foot, subsequent encounter    Long term (current) use of insulin    Personal history of Methicillin resistant Staphylococcus aureus infection    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    Protein calorie malnutrition    Simple chronic bronchitis    Tobacco use    Type 2 diabetes mellitus with diabetic neuropathy, unspecified    Type 2 diabetes mellitus with diabetic chronic kidney disease    Acute type 1  respiratory failure    Severe vascular dementia without behavioral disturbance, psychotic disturbance, mood disturbance, or anxiety    UTI (urinary tract infection)    Vomiting    Acute encephalopathy    Hypothermia     Past Medical History:   Diagnosis Date    Anemia     Dementia     Diabetes mellitus     Dysphagia     GERD (gastroesophageal reflux disease)     History of alcohol abuse     History of cocaine use     History of marijuana use     Hypertension     Osteomyelitis     Poor historian     records obtained from nursing home records & his family    Visual impairment      Past Surgical History:   Procedure Laterality Date    AMPUTATION FOOT Left 10/18/2022    Procedure: PARTIAL FIRST RAY AMPUTATION LEFT;  Surgeon: Yeison Petty MD;  Location:  [facility];  Service: Orthopedics;  Laterality: Left;    AMPUTATION FOOT Left 12/5/2022    Procedure: Transmetatarsal of Left Foot;  Surgeon: Yeison Petty MD;  Location:  [facility];  Service: Orthopedics;  Laterality: Left;    ENDOSCOPY N/A 3/14/2024    Procedure: ESOPHAGOGASTRODUODENOSCOPY WITH JEJUNAL TUBE INSERTION AT BEDSIDE;  Surgeon: Brunner, Mark I, MD;  Location:  [facility];  Service: Gastroenterology;  Laterality: N/A;    ENDOSCOPY W/ PEG TUBE PLACEMENT N/A 4/1/2024    Procedure: ESOPHAGOGASTRODUODENOSCOPY WITH PERCUTANEOUS ENDOSCOPIC GASTROSTOMY TUBE INSERTION;  Surgeon: Brunner, Mark I, MD;  Location:  [facility];  Service: Gastroenterology;  Laterality: N/A;  EGD with PEG placement.  Secured at 3.5 cm    EYE SURGERY         SLP Recommendation and Plan  SLP Swallowing Diagnosis: oral dysphagia, suspected pharyngeal dysphagia (10/28/24 0830)  SLP Diet Recommendation: NPO, long term alternate methods of nutrition/hydration (10/28/24 0830)  Recommended Precautions and Strategies: general aspiration precautions (10/28/24 0830)  SLP Rec. for Method of Medication Administration: meds via alternate route (10/28/24 0830)     Monitor for Signs of  Aspiration: notify SLP if any concerns (10/28/24 0830)  Recommended Diagnostics: reassess via clinical swallow evaluation (10/29) (10/28/24 0830)  Swallow Criteria for Skilled Therapeutic Interventions Met: demonstrates skilled criteria (10/28/24 0830)  Anticipated Discharge Disposition (SLP): skilled nursing facility (10/28/24 0830)  Rehab Potential/Prognosis, Swallowing: re-evaluate goals as necessary (10/28/24 0830)  Therapy Frequency (Swallow): 5 days per week (10/28/24 0830)  Predicted Duration Therapy Intervention (Days): 2 weeks (10/28/24 0830)  Oral Care Recommendations: Oral Care BID/PRN, Suction toothbrush (10/28/24 0830)                                        Progress: no change      SWALLOW EVALUATION (Last 72 Hours)       SLP Adult Swallow Evaluation       Row Name 10/28/24 0830       Rehab Evaluation    Document Type re-evaluation  -CJ    Subjective Information no complaints  -CJ    Patient Observations lethargic;cooperative  -CJ    Patient/Family/Caregiver Comments/Observations no family present  -CJ    Patient Effort fair  -CJ    Symptoms Noted During/After Treatment none  -CJ       General Information    Patient Profile Reviewed yes  -CJ    Pertinent History Of Current Problem see initial eval  -CJ    Current Method of Nutrition NPO;gastrostomy feedings  -CJ    Precautions/Limitations, Vision vision impairment, bilaterally  -CJ    Precautions/Limitations, Hearing WFL;for purposes of eval  -CJ    Prior Level of Function-Communication cognitive-linguistic impairment  -CJ    Prior Level of Function-Swallowing mechanical ground textures;thin liquids;alternative feeding method;other (see comments)  -CJ    Plans/Goals Discussed with patient  -CJ    Barriers to Rehab medically complex;visual deficit;previous functional deficit;cognitive status  -    Patient's Goals for Discharge patient did not state  -CJ       Pain Scale: FACES Pre/Post-Treatment    Pain: FACES Scale, Pretreatment 0-->no hurt  -CJ     Posttreatment Pain Rating 0-->no hurt  -CJ       Oral Motor Structure and Function    Dentition Assessment missing teeth  -CJ    Secretion Management anterior loss  -    Mucosal Quality sticky  -       Oral Musculature and Cranial Nerve Assessment    Oral Motor General Assessment generalized oral motor weakness  -       General Eating/Swallowing Observations    Respiratory Support Currently in Use nasal cannula  -CJ    Eating/Swallowing Skills fed by SLP;unable to perform self-feeding  -CJ    Positioning During Eating upright 90 degree;upright in bed  -    Utensils Used spoon;cup  -CJ    Consistencies Trialed pureed;ice chips;thin liquids;nectar/syrup-thick liquids  -       Clinical Swallow Eval    Oral Prep Phase impaired  -CJ    Oral Transit impaired  -CJ    Oral Residue impaired  -    Pharyngeal Phase suspected pharyngeal impairment  -       Oral Prep Concerns    Oral Prep Concerns increased prep time;incomplete or weak lip closure around spoon  -    Increased Prep Time pudding  -       Oral Transit Concerns    Oral Transit Concerns delayed initiation of bolus transit  -    Delayed Intiation of Bolus Transit all consistencies  -       Pharyngeal Phase Concerns    Pharyngeal Phase Concerns wet vocal quality  -    Wet Vocal Quality thin;nectar  -    Pharyngeal Phase Concerns, Comment Pt continus w/ limited participation and minimal po presentations accepted. Initially was agreeable however pt quickly started to refuse further presentations. Continues w/ overt s/s of aspiration w/ thins/nectar, refused puree presentations. Pt felt to be at high risk and given limited participation not appropriate for instrumental re-evaluation. Continue NPO w/ PEG  -CJ       SLP Evaluation Clinical Impression    SLP Swallowing Diagnosis oral dysphagia;suspected pharyngeal dysphagia  -    Functional Impact risk of aspiration/pneumonia;risk of malnutrition  -    Rehab Potential/Prognosis, Swallowing  re-evaluate goals as necessary  -    Swallow Criteria for Skilled Therapeutic Interventions Met demonstrates skilled criteria  -       SLP Treatment Clinical Impressions    Treatment Assessment (SLP) --    Treatment Assessment Comments (SLP) --    Daily Summary of Progress (SLP) --    Barriers to Overall Progress (SLP) --    Plan for Continued Treatment (SLP) --    Care Plan Review --       Recommendations    Therapy Frequency (Swallow) 5 days per week  -    Predicted Duration Therapy Intervention (Days) 2 weeks  -    SLP Diet Recommendation NPO;long term alternate methods of nutrition/hydration  -    Recommended Diagnostics reassess via clinical swallow evaluation  10/29  -    Recommended Precautions and Strategies general aspiration precautions  -    Oral Care Recommendations Oral Care BID/PRN;Suction toothbrush  -    SLP Rec. for Method of Medication Administration meds via alternate route  -    Monitor for Signs of Aspiration notify SLP if any concerns  -    Anticipated Discharge Disposition (SLP) [facility]  -              User Key  (r) = Recorded By, (t) = Taken By, (c) = Cosigned By      Initials Name Effective Dates    AC Kimberlee Castillo, MS CCC-SLP 02/03/23 -     Eliz Mooney, MS CCC-SLP 10/22/24 -                     EDUCATION  The patient has been educated in the following areas:   Dysphagia (Swallowing Impairment) Oral Care/Hydration NPO rationale.        SLP GOALS       Row Name 10/25/24 1500             (LTG) Patient will demonstrate functional swallow for    Diet Texture (Demonstrate functional swallow) soft to chew (ground) textures  -AC      Liquid viscosity (Demonstrate functional swallow) thin liquids  -AC      [place] (Demonstrate functional swallow) with maximum cues (25-49% accuracy)  -AC      Time Frame (Demonstrate functional swallow) 1 week  -AC      Progress/Outcomes (Demonstrate functional swallow) continuing progress toward goal  -AC       Comment (Demonstrate functional swallow) Trialed ice, thin via tsp, nectar via tsp/cup, puree. Accepted PO trials, but required coaching to close mouth around utensil and to initiate swallow. Hyperextended neck position. Multiple seemingly weak swallows per laryngeal palpation. Appeared to fatigue following a few trials and was difficult to rouse. Recent silent aspiration per MBS. DNT further PO.  -AC         (STG) Labial Strengthening Goal 1 (SLP)    Increase Labial Tone labial resistance exercises  -AC      [place]/Accuracy (Labial Strengthening Goal 1, SLP) with maximum cues (25-49% accuracy)  -AC      Time Frame (Labial Strengthening Goal 1, SLP) 1 week  -AC      Progress/Outcomes (Labial Strengthening Goal 1, SLP) progress slower than expected  -AC      Comment (Labial Strengthening Goal 1, SLP) 0% w/ max cues  -AC         (STG) Lingual Strengthening Goal 1 (SLP)    Increase Tongue Back Strength lingual resistance exercises  -AC      [place]/Accuracy (Lingual Strengthening Goal 1, SLP) with maximum cues (25-49% accuracy)  -AC      Time Frame (Lingual Strengthening Goal 1, SLP) 1 week  -AC      Progress/Outcomes (Lingual Strengthening Goal 1, SLP) progress slower than expected  -AC      Comment (Lingual Strengthening Goal 1, SLP) 0% w/ max cues  -AC         (STG) Pharyngeal Strengthening Exercise Goal 1 (SLP)    Increase Timing prepping - 3 second prep or suck swallow or 3-step swallow  -AC      Increase Superior Movement of the Hyolaryngeal Complex falsetto  -AC      Increase Anterior Movement of the Hyolaryngeal Complex chin tuck against resistance (CTAR)  -AC      Increase Squeeze/Positive Pressure Generation hard effortful swallow  -AC      [place]/Accuracy (Pharyngeal Strengthening Goal 1, SLP) with maximum cues (25-49% accuracy)  -AC      Time Frame (Pharyngeal Strengthening Goal 1, SLP) 1 week  -AC      Progress/Outcomes (Pharyngeal Strengthening Goal 1, SLP) goal ongoing  -AC      Comment  (Pharyngeal Strengthening Goal 1, SLP) Did not attempt 2' difficulty following basic commands, lethargy.  -                User Key  (r) = Recorded By, (t) = Taken By, (c) = Cosigned By      Initials Name Provider Type     Kimberlee Castillo MS CCC-SLP Speech and Language Pathologist                         Time Calculation:    Time Calculation- SLP       Row Name 10/28/24 0859             Time Calculation- SLP    SLP Start Time 0830  -      SLP Received On 10/28/24  -         Untimed Charges    90192-AU Eval Oral Pharyng Swallow Minutes 41  -         Total Minutes    Untimed Charges Total Minutes 41  -       Total Minutes 41  -CJ                User Key  (r) = Recorded By, (t) = Taken By, (c) = Cosigned By      Initials Name Provider Type    Eliz Mooney MS CCC-SLP Speech and Language Pathologist                    Therapy Charges for Today       Code Description Service Date Service Provider Modifiers Qty    [id number] HC ST EVAL ORAL PHARYNG SWALLOW 3 10/28/2024 Eliz Cartagena MS CCC-SLP GN 1                 Eliz Cartagena MS CCC-SLP  10/28/2024

## 2024-10-28 NOTE — PLAN OF CARE
Problem: Adult Inpatient Plan of Care  Goal: Plan of Care Review  Outcome: Progressing  Goal: Patient-Specific Goal (Individualized)  Outcome: Progressing  Goal: Absence of Hospital-Acquired Illness or Injury  Outcome: Progressing  Intervention: Identify and Manage Fall Risk  Intervention: Prevent Skin Injury  Intervention: Prevent and Manage VTE (Venous Thromboembolism) Risk  Intervention: Prevent Infection  Goal: Optimal Comfort and Wellbeing  Outcome: Progressing  Intervention: Provide Person-Centered Care  Goal: Readiness for Transition of Care  Outcome: Progressing     Problem: Skin Injury Risk Increased  Goal: Skin Health and Integrity  Outcome: Progressing  Intervention: Optimize Skin Protection     Problem: Fall Injury Risk  Goal: Absence of Fall and Fall-Related Injury  Outcome: Progressing  Intervention: Identify and Manage Contributors  Intervention: Promote Injury-Free Environment   Goal Outcome Evaluation:  Plan of Care Reviewed With: patient

## 2024-10-28 NOTE — CONSULTS
[facility]    INFECTIOUS DISEASE CONSULT/INITIAL HOSPITAL VISIT    Omkar Nava  1955  [id number]    Date of consult: 10/28/2024    Admit date: 10/20/2024    Requesting Provider: No Known Provider  Evaluating physician: Rayray Gordon MD  Reason for Consultation: Aspiration pneumonia  Chief Complaint: Above      Subjective   History of present illness:  Patient is a  69 y.o.  Yr old male with a history of dementia, diabetes mellitus type 2, blindness, essential hypertension, polysubstance use, nursing home resident, recent COVID-19 September 2024, dysphagia, GERD, who developed nausea and vomiting potentially related to intolerance of tube feedings on 10/20/2024.  Patient unable to feed himself.  Patient developed increasing shortness of breath and coughing and was admitted to [facility] on 10/20/2024 with CT findings consistent with possible pneumonia.  Urine culture on admission was also positive for Enterococcus faecalis, with negative blood cultures x 2.  Respiratory PCR positive for rhinovirus and 10/22.  MRSA screen positive.  Patient is unable to give history.  I was consulted on 10/28/2024 for further evaluation and treatment.  Patient currently on vancomycin and piperacillin/tazobactam since 10/26.  The patient had received amoxicillin 10/23 until 10/26.  He was asymptomatic from a urinary tract standpoint.    Past Medical History:   Diagnosis Date    Anemia     Dementia     Diabetes mellitus     Dysphagia     GERD (gastroesophageal reflux disease)     History of alcohol abuse     History of cocaine use     History of marijuana use     Hypertension     Osteomyelitis     Poor historian     records obtained from nursing home records & his family    Visual impairment        Past Surgical History:   Procedure Laterality Date    AMPUTATION FOOT Left 10/18/2022    Procedure: PARTIAL FIRST RAY AMPUTATION LEFT;  Surgeon: Yeison Petty MD;  Location:   [facility];  Service: Orthopedics;  Laterality: Left;    AMPUTATION FOOT Left 12/5/2022    Procedure: Transmetatarsal of Left Foot;  Surgeon: Yeison Petty MD;  Location:  [facility];  Service: Orthopedics;  Laterality: Left;    ENDOSCOPY N/A 3/14/2024    Procedure: ESOPHAGOGASTRODUODENOSCOPY WITH JEJUNAL TUBE INSERTION AT BEDSIDE;  Surgeon: Brunner, Mark I, MD;  Location:  [facility];  Service: Gastroenterology;  Laterality: N/A;    ENDOSCOPY W/ PEG TUBE PLACEMENT N/A 4/1/2024    Procedure: ESOPHAGOGASTRODUODENOSCOPY WITH PERCUTANEOUS ENDOSCOPIC GASTROSTOMY TUBE INSERTION;  Surgeon: Brunner, Mark I, MD;  Location:  [facility];  Service: Gastroenterology;  Laterality: N/A;  EGD with PEG placement.  Secured at 3.5 cm    EYE SURGERY         Pediatric History   Patient Parents    Not on file     Other Topics Concern    Not on file   Social History Narrative    Caffeine 0-1 servings per day    Patient lives at home .   Previous substance use, no current substance use or cigarette use.    family history includes Diabetes in his brother, brother, father, maternal grandfather, maternal grandmother, mother, and sister; Hypertension in his brother, brother, father, and mother.    No Known Allergies    Immunization History   Administered Date(s) Administered    COVID-19 (MODERNA) 12YRS+ (SPIKEVAX) 10/16/2023, 09/24/2024    COVID-19 (MODERNA) 1st,2nd,3rd Dose Monovalent 06/09/2021, 07/19/2021    COVID-19 (MODERNA) Monovalent Original Booster 04/11/2022    Fluzone (or Fluarix & Flulaval for VFC) >6mos 11/29/2023       Medication:  @Scheduled Meds:!Vancomycin Level Draw Needed, , Does not apply, Once  albuterol, 2.5 mg, Nebulization, Q6H - RT  amLODIPine, 10 mg, Per PEG Tube, Q24H  ARIPiprazole, 5 mg, Per G Tube, Daily  brimonidine, 1 drop, Both Eyes, TID  carvedilol, 12.5 mg, Per PEG Tube, Q12H  cloNIDine, 0.2 mg, Per PEG Tube, Q8H  enoxaparin, 40 mg, Subcutaneous, Daily  FLUoxetine, 20 mg, Per PEG Tube,  Daily  hydrALAZINE, 100 mg, Per PEG Tube, Q8H  insulin lispro, 2-7 Units, Subcutaneous, Q6H  lansoprazole, 30 mg, Per PEG Tube, Q AM  palliative care oral rinse, 5 mL, Mouth/Throat, 4x Daily  piperacillin-tazobactam, 3.375 g, Intravenous, Q8H  QUEtiapine, 25 mg, Per PEG Tube, Nightly  sodium chloride, 10 mL, Intravenous, Q12H  terazosin, 5 mg, Per G Tube, Q12H  timolol, 1 drop, Both Eyes, BID  Valproic Acid, 150 mg, Nasogastric, Q8H  vancomycin, 750 mg, Intravenous, Q12H      Continuous Infusions:Pharmacy to dose vancomycin,       PRN Meds:.  acetaminophen **OR** acetaminophen **OR** acetaminophen    senna-docusate sodium **AND** polyethylene glycol **AND** [DISCONTINUED] bisacodyl **AND** bisacodyl    dextrose    glucagon (human recombinant)    hydrOXYzine    Magnesium Standard Dose Replacement - Follow Nurse / BPA Driven Protocol    nitroglycerin    [DISCONTINUED] ondansetron ODT **OR** ondansetron    Pharmacy to dose vancomycin    Phosphorus Replacement - Follow Nurse / BPA Driven Protocol    Potassium Replacement - Follow Nurse / BPA Driven Protocol    sodium chloride    sodium chloride     Please refer to the medical record for a full medication list    Review of Systems:    Constitutional-- No Fever, chills or sweats.  Appetite good, and no malaise. No fatigue.  HEENT-- No new vision, hearing or throat complaints.  No epistaxis or oral sores.  Denies odynophagia or dysphagia.  No odynophagia or dysphagia. No headache, photophobia or neck stiffness.  CV-- No chest pain, palpitation or syncope  Resp--some SOB/occasional nonproductive cough/no hemoptysis  GI- No nausea, vomiting, or diarrhea.  No hematochezia, melena, or hematemesis. Denies jaundice or chronic liver disease.  -- No dysuria, hematuria, or flank pain.  Denies hesitancy, urgency.  Lymph- no swollen lymph nodes in neck/axilla or groin.   Heme- No active bruising or bleeding; no Hx of DVT or PE.  MS-- no swelling or pain in the bones or joints of  "arms/legs.  No new back pain.  Neuro-- No acute focal weakness or numbness in the arms or legs.  No seizures.  Skin--No rashes or lesions    Physical Exam:   Vital Signs   Temp:  [97.8 °F (36.6 °C)-98.1 °F (36.7 °C)] 97.8 °F (36.6 °C)  Heart Rate:  [53-66] 58  Resp:  [16] 16  BP: (135-153)/(56-62) 145/62    Blood pressure 145/62, pulse 58, temperature 97.8 °F (36.6 °C), temperature source Axillary, resp. rate 16, height 172.7 cm (68\"), weight 77 kg (169 lb 12.8 oz), SpO2 96%.  GENERAL: Awake and alert, in moderate distress. Appears older than stated age.  Resting in bed.  HEENT:  Normocephalic, atraumatic.  Oropharynx without thrush. Dentition in good repair. No cervical adenopathy. No neck masses.  Ears externally normal, Nose externally normal.  EYES:  No conjunctival injection. No icterus. EOM full.  LYMPHATICS: No lymphadenopathy of the neck or axillary or inguinal regions.   HEART: No murmur, gallop, or pericardial friction rub. Reg rate rhythm, No JVD at 45 degrees.  LUNGS: Basilar rales. No respiratory distress, no use of accessory muscles.  ABDOMEN: Soft, nontender, nondistended. No appreciable HSM.  Bowel sounds normal.  GENITAL: No external lesions, breasts without masses, back straight, no CVAT, rectal external without lesions.   SKIN: Warm and dry without cutaneous eruptions.  No nodules.    PSYCHIATRIC: Mental status lucid. No confusion.  EXT:  No cellulitic change.  NEURO: Oriented to name, CN 2 to 12 intact, except blind, DTR 1 + and symmetric, sensory intact to LT upper and lower extremity, motor 5/5 upper and lower extremity, cerebellar and gait not tested.      Results Review:   I reviewed the patient's new clinical results.  I reviewed the patient's new imaging results and agree with the interpretation.  I reviewed the patient's other test results and agree with the interpretation    Results from last 7 days   Lab Units 10/28/24  0922 10/27/24  0850 10/25/24  2869   WBC 10*3/mm3 10.38 7.53 9.12 " "  HEMOGLOBIN g/dL 8.2* 8.0* 8.1*   HEMATOCRIT % 25.0* 24.1* 24.4*   PLATELETS 10*3/mm3 245 209 196     Results from last 7 days   Lab Units 10/28/24  0922   SODIUM mmol/L 136   POTASSIUM mmol/L 5.5*   CHLORIDE mmol/L 102   CO2 mmol/L 24.0   BUN mg/dL 28*   CREATININE mg/dL 1.10   GLUCOSE mg/dL 186*   CALCIUM mg/dL 8.4*     Results from last 7 days   Lab Units 10/23/24  0943   ALK PHOS U/L 92   BILIRUBIN mg/dL <0.2   ALT (SGPT) U/L 22   AST (SGOT) U/L 13             Results from last 7 days   Lab Units 10/28/24  0922   VANCOMYCIN TR mcg/mL 16.00     Results from last 7 days   Lab Units 10/25/24  2219   LACTATE mmol/L 1.0     Estimated Creatinine Clearance: 71 mL/min (by C-G formula based on SCr of 1.1 mg/dL).  CPK          9/13/2024    07:46   Common Labs   Creatine Kinase 308       Procalitonin Results:      Lab 10/28/24  0922 10/27/24  0850 10/26/24  1206 10/22/24  1317   PROCALCITONIN 0.12 0.14 0.16 0.05      Brief Urine Lab Results  (Last result in the past 365 days)        Color   Clarity   Blood   Leuk Est   Nitrite   Protein   CREAT   Urine HCG        10/20/24 2123 Yellow   Clear   Negative   Negative   Negative   100 mg/dL (2+)                  No results found for: \"SITE\", \"ALLENTEST\", \"PHART\", \"TJZ1FTV\", \"PO2ART\", \"AVV5WNY\", \"BASEEXCESS\", \"D3EKHSRO\", \"HGBBG\", \"HCTABG\", \"OXYHEMOGLOBI\", \"METHHGBN\", \"CARBOXYHGB\", \"CO2CT\", \"BAROMETRIC\", \"MODALITY\", \"FIO2\"     Microbiology:      Results for orders placed or performed during the hospital encounter of 10/20/24   Blood Culture - Blood, Hand, Right    Specimen: Hand, Right; Blood   Result Value Ref Range    Blood Culture No growth at 2 days       Urine Culture          10/20/2024    21:23   Urine Culture   Urine Culture >100,000 CFU/mL Enterococcus faecalis            Brief Urine Lab Results  (Last result in the past 365 days)        Color   Clarity   Blood   Leuk Est   Nitrite   Protein   CREAT   Urine HCG        10/20/24 2123 Yellow   Clear   Negative   Negative   " Negative   100 mg/dL (2+)                   Blood Culture   Date Value Ref Range Status   10/25/2024 No growth at 2 days  Preliminary   10/25/2024 No growth at 2 days  Preliminary     Urinalysis 6-10 WBCs 10/20.    Blood Culture   Date Value Ref Range Status   10/25/2024 No growth at 2 days  Preliminary   10/25/2024 No growth at 2 days  Preliminary   (      Radiology:  Imaging Results (Last 72 Hours)       Procedure Component Value Units Date/Time    XR Chest 1 View [[id number]] Collected: 10/26/24 0939     Updated: 10/26/24 0944    Narrative:      XR CHEST 1 VW    Date of Exam: 10/26/2024 8:18 AM EDT    Indication: Hypothermic overnight, infectious workup    Comparison: CT chest 10/20/2024, AP chest x-ray 3/31/2024    Findings:  The patient is rotated.    Airspace opacities appear decreased compared to most recent chest x-ray from 3/31/2024, but there are bibasilar airspace opacities, right greater than left. No pneumothorax or large pleural effusion is seen. Cardiomediastinal contours are stable.      Impression:      Impression:  Bibasilar airspace opacities, right greater than left, which may be due to atelectasis and/or pneumonia.      Electronically Signed: Agnes Freed    10/26/2024 9:41 AM EDT    Workstation ID: [id number]            IMPRESSION:     Likely recurrent aspiration pneumonia with episodes of nausea, vomiting, with altered mental status.  Issues with his tube feeding.  Chest x-ray slightly worse right lower lobe.  Partially treated.  Rhinovirus PCR +10/22.  MRSA colonized.  PCR +10/26.  Blood cultures x 210/25 negative.  Acute bacterial cystitis versus asymptomatic bacteriuria with urinalysis with 6-10 WBCs with Enterococcus.  Should be adequately treated.  Anemia, chronic disease.  Leukocytosis, neutrophilic on 10/24 15,000 likely related to above issues.  Resolved.  Blindness, affects all aspects of care.  Dementia, affects all aspects of care and patient cannot  self-feed.    RECOMMENDATIONS:    Diagnostically, continue to follow patient's physical exam, CBC, CMP, CRP.  Cultures which have been obtained.  Radiographic studies.  Therapeutically, consider changing to cefuroxime and doxycycline to continue until 11/4/2024 and reassess.  Supportive care.  Room air on 10/28/2024.    I discussed the patient's findings and my recommendations with patient and nursing staff    Thank you for asking me to see Omkar Nava.  Our group would be pleased to follow this patient over the course of their hospitalization and assist with outpatient antimicrobial therapy, as indicated.  Further recommendations depend on the results of the cultures and clinical course.  Side effects of medications discussed.  The patient is at increased risk for adverse drug reactions, complications of IV access, and readmission.    Rayray Gordon MD  10/28/2024

## 2024-10-28 NOTE — PROGRESS NOTES
[facility] Medicine Services  PROGRESS NOTE    Patient Name: Omkar Nava  : 1955  MRN: [id number]    Date of Admission: 10/20/2024  Primary Care Physician: Alberto Dye MD    Subjective   Subjective     CC:  Vomiting    HPI:  Patient lethargic this morning however was answering questions.  Later in the morning he was more alert and asking me to leave him alone.  He did follow commands.  Nursing reports patient's mental status does wax and wane and He also intermittently cooperative.      Objective   Objective     Vital Signs:   Temp:  [97.8 °F (36.6 °C)-98.1 °F (36.7 °C)] 97.8 °F (36.6 °C)  Heart Rate:  [48-66] 57  Resp:  [16] 16  BP: (135-153)/(56-62) 145/62     Physical Exam:  Constitutional: Chronically ill appearing male, no acute distress, awake, alert  HENT: NCAT, mucous membranes moist  Respiratory: Clear to auscultation bilaterally, respiratory effort normal   Cardiovascular: RRR, no murmurs, rubs, or gallops  Gastrointestinal: Positive bowel sounds, soft, nontender, nondistended  Musculoskeletal: No bilateral ankle edema  Psychiatric: flat affect, will follow commands  Neurologic: Oriented x 1, self. speech clear  Skin: No rashes    Results Reviewed:  LAB RESULTS:      Lab 10/27/24  0850 10/26/24  1206 10/25/24  2219 10/25/24  0943 10/24/24  0915 10/23/24  0943 10/22/24  1317   WBC 7.53  --  9.12 10.34 15.34* 14.01* 5.80   HEMOGLOBIN 8.0*  --  8.1* 7.8* 8.6* 8.5* 8.4*   HEMATOCRIT 24.1*  --  24.4* 23.8* 26.0* 25.7* 26.3*   PLATELETS 209  --  196 178 206 221 206   NEUTROS ABS  --   --  7.26* 7.95* 12.42* 11.60* 3.80   IMMATURE GRANS (ABS)  --   --  0.07* 0.09* 0.11* 0.07* 0.01   LYMPHS ABS  --   --  1.06 1.35 1.50 1.19 1.30   MONOS ABS  --   --  0.59 0.75 1.13* 1.11* 0.57   EOS ABS  --   --  0.12 0.18 0.16 0.02 0.11   MCV 93.8  --  93.1 92.2 93.5 93.1 92.6   PROCALCITONIN 0.14 0.16  --   --   --   --  0.05   LACTATE  --   --  1.0  --   --   --   --          Lab  10/27/24  0850 10/25/24  0943 10/24/24  0915 10/23/24  0943 10/21/24  1136   SODIUM 138 140 140 142 143   POTASSIUM 4.5 4.4 5.0 4.6 5.0   CHLORIDE 102 102 105 107 110*   CO2 27.0 28.0 27.0 25.0 23.0   ANION GAP 9.0 10.0 8.0 10.0 10.0   BUN 29* 34* 36* 33* 30*   CREATININE 1.03 0.94 1.01 1.25 1.12   EGFR 79.6 88.9 81.5 63.1 72.0   GLUCOSE 101* 171* 181* 153* 138*   CALCIUM 8.7 8.7 9.3 8.8 9.5         Lab 10/23/24  0943   TOTAL PROTEIN 5.9*   ALBUMIN 3.2*   GLOBULIN 2.7   ALT (SGPT) 22   AST (SGOT) 13   BILIRUBIN <0.2   ALK PHOS 92                     Lab 10/22/24  1325   FIO2 28   CARBOXYHEMOGLOBIN (VENOUS) 1.0     Brief Urine Lab Results  (Last result in the past 365 days)        Color   Clarity   Blood   Leuk Est   Nitrite   Protein   CREAT   Urine HCG        10/20/24 2123 Yellow   Clear   Negative   Negative   Negative   100 mg/dL (2+)                   Cultures:  Blood Culture   Date Value Ref Range Status   10/25/2024 No growth at 2 days  Preliminary   10/25/2024 No growth at 2 days  Preliminary       Microbiology Results Abnormal       Procedure Component Value - Date/Time    Blood Culture - Blood, Hand, Left [[id number]]  (Normal) Collected: 10/25/24 2000    Lab Status: Preliminary result Specimen: Blood from Hand, Left Updated: 10/28/24 0015     Blood Culture No growth at 2 days    Narrative:      Aerobic Bottle Only    Less than seven (7) mL's of blood was collected.  Insufficient quantity may yield false negative results.    Blood Culture - Blood, Hand, Right [[id number]]  (Normal) Collected: 10/25/24 2000    Lab Status: Preliminary result Specimen: Blood from Hand, Right Updated: 10/28/24 0015     Blood Culture No growth at 2 days    Narrative:      Aerobic Bottle Only      Blood Culture - Blood, Arm, Right [[id number]]  (Normal) Collected: 10/21/24 0107    Lab Status: Final result Specimen: Blood from Arm, Right Updated: 10/26/24 0215     Blood Culture No growth at 5 days    Blood Culture - Blood, Hand,  Right [[id number]]  (Normal) Collected: 10/21/24 0107    Lab Status: Final result Specimen: Blood from Hand, Right Updated: 10/26/24 0215     Blood Culture No growth at 5 days            XR Chest 1 View    Result Date: 10/26/2024  XR CHEST 1 VW Date of Exam: 10/26/2024 8:18 AM EDT Indication: Hypothermic overnight, infectious workup Comparison: CT chest 10/20/2024, AP chest x-ray 3/31/2024 Findings: The patient is rotated. Airspace opacities appear decreased compared to most recent chest x-ray from 3/31/2024, but there are bibasilar airspace opacities, right greater than left. No pneumothorax or large pleural effusion is seen. Cardiomediastinal contours are stable.     Impression: Impression: Bibasilar airspace opacities, right greater than left, which may be due to atelectasis and/or pneumonia. Electronically Signed: Agnes Freed  10/26/2024 9:41 AM EDT  Workstation ID: [id number]     Results for orders placed during the hospital encounter of 01/15/24    Adult Transthoracic Echo Complete With Contrast if Necessary Per Protocol    Interpretation Summary    Left ventricular ejection fraction appears to be 56 - 60%.    Left ventricular wall thickness is consistent with hypertrophy.    Estimated right ventricular systolic pressure from tricuspid regurgitation is mildly elevated (35-45 mmHg).    There is a small (1-2cm) pericardial effusion.    No evidence for pericardial tamponade      Current medications:  Scheduled Meds:!Vancomycin Level Draw Needed, , Does not apply, Once  albuterol, 2.5 mg, Nebulization, Q6H - RT  amLODIPine, 10 mg, Per PEG Tube, Q24H  ARIPiprazole, 5 mg, Per G Tube, Daily  brimonidine, 1 drop, Both Eyes, TID  carvedilol, 12.5 mg, Per PEG Tube, Q12H  cloNIDine, 0.2 mg, Per PEG Tube, Q8H  enoxaparin, 40 mg, Subcutaneous, Daily  FLUoxetine, 20 mg, Per PEG Tube, Daily  hydrALAZINE, 100 mg, Per PEG Tube, Q8H  insulin lispro, 2-7 Units, Subcutaneous, Q6H  lansoprazole, 30 mg, Per PEG Tube, Q  AM  palliative care oral rinse, 5 mL, Mouth/Throat, 4x Daily  piperacillin-tazobactam, 3.375 g, Intravenous, Q8H  QUEtiapine, 25 mg, Per PEG Tube, Nightly  sodium chloride, 10 mL, Intravenous, Q12H  terazosin, 5 mg, Per G Tube, Q12H  timolol, 1 drop, Both Eyes, BID  Valproic Acid, 150 mg, Nasogastric, Q8H  vancomycin, 750 mg, Intravenous, Q12H      Continuous Infusions:Pharmacy to dose vancomycin,       PRN Meds:.  acetaminophen **OR** acetaminophen **OR** acetaminophen    senna-docusate sodium **AND** polyethylene glycol **AND** [DISCONTINUED] bisacodyl **AND** bisacodyl    dextrose    glucagon (human recombinant)    hydrOXYzine    Magnesium Standard Dose Replacement - Follow Nurse / BPA Driven Protocol    nitroglycerin    [DISCONTINUED] ondansetron ODT **OR** ondansetron    Pharmacy to dose vancomycin    Phosphorus Replacement - Follow Nurse / BPA Driven Protocol    Potassium Replacement - Follow Nurse / BPA Driven Protocol    sodium chloride    sodium chloride    Assessment & Plan   Assessment & Plan     Active Hospital Problems    Diagnosis  POA    **UTI (urinary tract infection) [N39.0]  Yes    Hypothermia [T68.XXXA]  Yes    Acute encephalopathy [G93.40]  Yes    Vomiting [R11.10]  Yes    Severe vascular dementia without behavioral disturbance, psychotic disturbance, mood disturbance, or anxiety [F01.C0]  Yes    Protein calorie malnutrition [E46]  Yes    S/P transmetatarsal amputation of foot, left [Z89.432]  Not Applicable    Anemia of chronic disease [D63.8]  Yes    Neurocognitive disorder [R41.9]  Yes    Type 2 diabetes mellitus [E11.9]  Yes    Essential hypertension [I10]  Yes    Hypertensive heart and chronic kidney disease 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 failure, with stage 1 through stage 4 chronic kidney disease, or unspecified chronic kidney disease [I13.10]  Yes    Long term (current) use of insulin [Z79.4]  Not Applicable      Resolved Hospital Problems   No resolved problems to display.        Brief Hospital Course to  date:  Omkar Nava is a 69 y.o. male with a PMHx of hypertension, diabetes, chronic kidney disease, vascular dementia, dysphagia, and chronic iron def anemia admitted with increased vomiting, with concern for possible underlying infection. Found to be positive for Rhinovirus and UTI.  The patient initially on amoxicillin for UTI.  Overnight on 10/25 through 10/26 patient became hypothermic with a T minimum of 92 degrees, requiring external warming.  Chest x-ray revealed bibasilar opacities. Antibiotics broadened on 10/26.      Postviral pneumonia versus HAP  - On 10/25, patient became hypothermic requiring external warming (Tmin 92)  - Infectious workup obtained including blood cultures and chest x-ray  - Currently on amoxicillin for UTI  - Chest x-ray revealing bibasilar opacities, though decreased from previous chest xrays  - MRSA nares positive  - No leukocytosis, procal 0.05 -> 0.16, lactate 1.0  PLAN  - Patient much more alert/conversational after starting abx yesterday  - Continue Vanc and Zosyn for now. Given complex hospitalization, appreciate ID recommendations   - bld cx 10/26: NGTD     Acute encephalopathy likely secondary to infection  Initial UTI/viral pneumonia  - UA with 4+ bacteria and urine cultures growing Enterococcus faecalis.    - Respiratory panel positive for rhinovirus  - Abx per above      Hyperkalemia, resolved  - s/p lokelma xs 2      JAYY on CKD-stage 2, resolved   - Baseline creatinine near 1.1-1.2  - Cr near baseline  - Status post IVF     Chronic aspiration  - PEG tube in place  - SLP recommended NPO 10/25  - Pt with pureed foods, nectar thick liquids per daughter.--although this is different from SLP notes from last admission    Chronic anemia  - At baseline, cont to monitor.     HFpEF  HTN  - Echo with EF of 56 to 60%  - Continue carvedilol, clonidine, terazosin, hydralazine  - Hold diuretics     T2DM  - A1c 5.6 last hospitalization  - Due to poor intake, hold levemir for now.   Cover with SSI.     Dementia with agitation  Questionable seizure disorder  - Seroquel, Depakote, Abilify, prozac  -AM Depakote level pending     Expected Discharge Location and Transportation: long term care  Expected Discharge 10/28/2024  Expected Discharge Date: 10/26/2024; Expected Discharge Time:      VTE Prophylaxis:  Pharmacologic VTE prophylaxis orders are present.         AM-PAC 6 Clicks Score (PT): 6 (10/27/24 2000)    CODE STATUS:   Code Status and Medical Interventions: CPR (Attempt to Resuscitate); Full Support   Ordered at: 10/21/24 0146     Level Of Support Discussed With:    Health Care Surrogate     Code Status (Patient has no pulse and is not breathing):    CPR (Attempt to Resuscitate)     Medical Interventions (Patient has pulse or is breathing):    Full Support       Farhana Lion MD  10/28/24

## 2024-10-29 ENCOUNTER — APPOINTMENT (OUTPATIENT)
Dept: GENERAL RADIOLOGY | Facility: HOSPITAL | Age: 69
End: 2024-10-29
Payer: MEDICARE

## 2024-10-29 LAB
ALBUMIN SERPL-MCNC: 3.3 G/DL (ref 3.5–5.2)
ALBUMIN/GLOB SERPL: 1.2 G/DL
ALP SERPL-CCNC: 100 U/L (ref 39–117)
ALT SERPL W P-5'-P-CCNC: 21 U/L (ref 1–41)
ANION GAP SERPL CALCULATED.3IONS-SCNC: 8 MMOL/L (ref 5–15)
AST SERPL-CCNC: 15 U/L (ref 1–40)
BASOPHILS # BLD AUTO: 0.03 10*3/MM3 (ref 0–0.2)
BASOPHILS NFR BLD AUTO: 0.3 % (ref 0–1.5)
BILIRUB SERPL-MCNC: <0.2 MG/DL (ref 0–1.2)
BUN SERPL-MCNC: 25 MG/DL (ref 8–23)
BUN/CREAT SERPL: 25 (ref 7–25)
CALCIUM SPEC-SCNC: 8.7 MG/DL (ref 8.6–10.5)
CHLORIDE SERPL-SCNC: 104 MMOL/L (ref 98–107)
CO2 SERPL-SCNC: 26 MMOL/L (ref 22–29)
CREAT SERPL-MCNC: 1 MG/DL (ref 0.76–1.27)
CRP SERPL-MCNC: 1.33 MG/DL (ref 0–0.5)
DEPRECATED RDW RBC AUTO: 49.7 FL (ref 37–54)
EGFRCR SERPLBLD CKD-EPI 2021: 82.5 ML/MIN/1.73
EOSINOPHIL # BLD AUTO: 0.13 10*3/MM3 (ref 0–0.4)
EOSINOPHIL NFR BLD AUTO: 1.3 % (ref 0.3–6.2)
ERYTHROCYTE [DISTWIDTH] IN BLOOD BY AUTOMATED COUNT: 14.4 % (ref 12.3–15.4)
GLOBULIN UR ELPH-MCNC: 2.7 GM/DL
GLUCOSE BLDC GLUCOMTR-MCNC: 197 MG/DL (ref 70–130)
GLUCOSE BLDC GLUCOMTR-MCNC: 205 MG/DL (ref 70–130)
GLUCOSE BLDC GLUCOMTR-MCNC: 207 MG/DL (ref 70–130)
GLUCOSE BLDC GLUCOMTR-MCNC: 216 MG/DL (ref 70–130)
GLUCOSE BLDC GLUCOMTR-MCNC: 268 MG/DL (ref 70–130)
GLUCOSE SERPL-MCNC: 195 MG/DL (ref 65–99)
HCT VFR BLD AUTO: 25.2 % (ref 37.5–51)
HGB BLD-MCNC: 7.9 G/DL (ref 13–17.7)
IMM GRANULOCYTES # BLD AUTO: 0.18 10*3/MM3 (ref 0–0.05)
IMM GRANULOCYTES NFR BLD AUTO: 1.9 % (ref 0–0.5)
LYMPHOCYTES # BLD AUTO: 1.43 10*3/MM3 (ref 0.7–3.1)
LYMPHOCYTES NFR BLD AUTO: 14.8 % (ref 19.6–45.3)
MCH RBC QN AUTO: 29.8 PG (ref 26.6–33)
MCHC RBC AUTO-ENTMCNC: 31.3 G/DL (ref 31.5–35.7)
MCV RBC AUTO: 95.1 FL (ref 79–97)
MONOCYTES # BLD AUTO: 0.93 10*3/MM3 (ref 0.1–0.9)
MONOCYTES NFR BLD AUTO: 9.7 % (ref 5–12)
NEUTROPHILS NFR BLD AUTO: 6.93 10*3/MM3 (ref 1.7–7)
NEUTROPHILS NFR BLD AUTO: 72 % (ref 42.7–76)
NRBC BLD AUTO-RTO: 0 /100 WBC (ref 0–0.2)
PLATELET # BLD AUTO: 217 10*3/MM3 (ref 140–450)
PMV BLD AUTO: 11.1 FL (ref 6–12)
POTASSIUM SERPL-SCNC: 5.2 MMOL/L (ref 3.5–5.2)
PROT SERPL-MCNC: 6 G/DL (ref 6–8.5)
RBC # BLD AUTO: 2.65 10*6/MM3 (ref 4.14–5.8)
SODIUM SERPL-SCNC: 138 MMOL/L (ref 136–145)
VALPROATE SERPL-MCNC: 28.7 MCG/ML (ref 50–125)
WBC NRBC COR # BLD AUTO: 9.63 10*3/MM3 (ref 3.4–10.8)

## 2024-10-29 PROCEDURE — 25010000002 PIPERACILLIN SOD-TAZOBACTAM PER 1 G: Performed by: STUDENT IN AN ORGANIZED HEALTH CARE EDUCATION/TRAINING PROGRAM

## 2024-10-29 PROCEDURE — 92611 MOTION FLUOROSCOPY/SWALLOW: CPT

## 2024-10-29 PROCEDURE — 92610 EVALUATE SWALLOWING FUNCTION: CPT

## 2024-10-29 PROCEDURE — 25010000002 VANCOMYCIN 750 MG RECONSTITUTED SOLUTION 1 EACH VIAL

## 2024-10-29 PROCEDURE — 80164 ASSAY DIPROPYLACETIC ACD TOT: CPT | Performed by: FAMILY MEDICINE

## 2024-10-29 PROCEDURE — 82948 REAGENT STRIP/BLOOD GLUCOSE: CPT

## 2024-10-29 PROCEDURE — 93005 ELECTROCARDIOGRAM TRACING: CPT

## 2024-10-29 PROCEDURE — 99222 1ST HOSP IP/OBS MODERATE 55: CPT

## 2024-10-29 PROCEDURE — 25810000003 SODIUM CHLORIDE 0.9 % SOLUTION 250 ML FLEX CONT

## 2024-10-29 PROCEDURE — 85025 COMPLETE CBC W/AUTO DIFF WBC: CPT | Performed by: INTERNAL MEDICINE

## 2024-10-29 PROCEDURE — 25010000002 ENOXAPARIN PER 10 MG: Performed by: PEDIATRICS

## 2024-10-29 PROCEDURE — 97530 THERAPEUTIC ACTIVITIES: CPT

## 2024-10-29 PROCEDURE — 94664 DEMO&/EVAL PT USE INHALER: CPT

## 2024-10-29 PROCEDURE — 80053 COMPREHEN METABOLIC PANEL: CPT | Performed by: INTERNAL MEDICINE

## 2024-10-29 PROCEDURE — 74230 X-RAY XM SWLNG FUNCJ C+: CPT

## 2024-10-29 PROCEDURE — 94799 UNLISTED PULMONARY SVC/PX: CPT

## 2024-10-29 PROCEDURE — 99232 SBSQ HOSP IP/OBS MODERATE 35: CPT | Performed by: FAMILY MEDICINE

## 2024-10-29 PROCEDURE — 63710000001 INSULIN LISPRO (HUMAN) PER 5 UNITS: Performed by: STUDENT IN AN ORGANIZED HEALTH CARE EDUCATION/TRAINING PROGRAM

## 2024-10-29 PROCEDURE — 86140 C-REACTIVE PROTEIN: CPT | Performed by: INTERNAL MEDICINE

## 2024-10-29 RX ORDER — POLYETHYLENE GLYCOL 3350 17 G/17G
17 POWDER, FOR SOLUTION ORAL DAILY
Status: DISCONTINUED | OUTPATIENT
Start: 2024-10-29 | End: 2024-10-29

## 2024-10-29 RX ORDER — DOXYCYCLINE 100 MG/1
100 CAPSULE ORAL EVERY 12 HOURS SCHEDULED
Status: DISCONTINUED | OUTPATIENT
Start: 2024-10-29 | End: 2024-10-29

## 2024-10-29 RX ORDER — POLYETHYLENE GLYCOL 3350 17 G/17G
17 POWDER, FOR SOLUTION ORAL DAILY
Status: DISCONTINUED | OUTPATIENT
Start: 2024-10-29 | End: 2024-10-31 | Stop reason: HOSPADM

## 2024-10-29 RX ORDER — CEFUROXIME AXETIL 250 MG/1
500 TABLET ORAL EVERY 12 HOURS SCHEDULED
Status: DISCONTINUED | OUTPATIENT
Start: 2024-10-29 | End: 2024-10-31 | Stop reason: HOSPADM

## 2024-10-29 RX ORDER — DOXYCYCLINE 100 MG/1
100 CAPSULE ORAL EVERY 12 HOURS SCHEDULED
Status: DISCONTINUED | OUTPATIENT
Start: 2024-10-29 | End: 2024-10-31 | Stop reason: HOSPADM

## 2024-10-29 RX ORDER — DONEPEZIL HYDROCHLORIDE 10 MG/1
5 TABLET, FILM COATED ORAL NIGHTLY
Status: DISCONTINUED | OUTPATIENT
Start: 2024-10-29 | End: 2024-10-31 | Stop reason: HOSPADM

## 2024-10-29 RX ORDER — CEFUROXIME AXETIL 250 MG/1
500 TABLET ORAL EVERY 12 HOURS SCHEDULED
Status: DISCONTINUED | OUTPATIENT
Start: 2024-10-29 | End: 2024-10-29

## 2024-10-29 RX ORDER — DONEPEZIL HYDROCHLORIDE 10 MG/1
5 TABLET, FILM COATED ORAL NIGHTLY
Status: DISCONTINUED | OUTPATIENT
Start: 2024-10-29 | End: 2024-10-29

## 2024-10-29 RX ORDER — DOCUSATE SODIUM 50 MG/5 ML
100 LIQUID (ML) ORAL DAILY
Status: DISCONTINUED | OUTPATIENT
Start: 2024-10-29 | End: 2024-10-31 | Stop reason: HOSPADM

## 2024-10-29 RX ADMIN — DOXYCYCLINE 100 MG: 100 CAPSULE ORAL at 23:07

## 2024-10-29 RX ADMIN — VANCOMYCIN HYDROCHLORIDE 750 MG: 750 INJECTION, POWDER, LYOPHILIZED, FOR SOLUTION INTRAVENOUS at 13:03

## 2024-10-29 RX ADMIN — DONEPEZIL HYDROCHLORIDE 5 MG: 10 TABLET, FILM COATED ORAL at 23:07

## 2024-10-29 RX ADMIN — PIPERACILLIN AND TAZOBACTAM 3.38 G: 3; .375 INJECTION, POWDER, LYOPHILIZED, FOR SOLUTION INTRAVENOUS at 02:21

## 2024-10-29 RX ADMIN — TIMOLOL MALEATE 1 DROP: 5 SOLUTION/ DROPS OPHTHALMIC at 08:39

## 2024-10-29 RX ADMIN — MINERAL OIL 5 ML: 1000 LIQUID ORAL at 13:04

## 2024-10-29 RX ADMIN — DOCUSATE SODIUM 100 MG: 50 LIQUID ORAL at 09:54

## 2024-10-29 RX ADMIN — PIPERACILLIN AND TAZOBACTAM 3.38 G: 3; .375 INJECTION, POWDER, LYOPHILIZED, FOR SOLUTION INTRAVENOUS at 10:14

## 2024-10-29 RX ADMIN — CEFUROXIME AXETIL 500 MG: 250 TABLET, FILM COATED ORAL at 23:09

## 2024-10-29 RX ADMIN — MINERAL OIL 5 ML: 1000 LIQUID ORAL at 23:12

## 2024-10-29 RX ADMIN — Medication 10 ML: at 08:40

## 2024-10-29 RX ADMIN — INSULIN LISPRO 3 UNITS: 100 INJECTION, SOLUTION INTRAVENOUS; SUBCUTANEOUS at 18:17

## 2024-10-29 RX ADMIN — QUETIAPINE FUMARATE 25 MG: 25 TABLET ORAL at 23:06

## 2024-10-29 RX ADMIN — AMLODIPINE BESYLATE 10 MG: 10 TABLET ORAL at 08:38

## 2024-10-29 RX ADMIN — BARIUM SULFATE 20 ML: 400 PASTE ORAL at 14:21

## 2024-10-29 RX ADMIN — ENOXAPARIN SODIUM 40 MG: 100 INJECTION SUBCUTANEOUS at 08:38

## 2024-10-29 RX ADMIN — TERAZOSIN HYDROCHLORIDE 5 MG: 5 CAPSULE ORAL at 08:38

## 2024-10-29 RX ADMIN — BRIMONIDINE TARTRATE 1 DROP: 2 SOLUTION/ DROPS OPHTHALMIC at 23:11

## 2024-10-29 RX ADMIN — POLYETHYLENE GLYCOL 3350 17 G: 17 POWDER, FOR SOLUTION ORAL at 09:54

## 2024-10-29 RX ADMIN — CARVEDILOL 12.5 MG: 6.25 TABLET, FILM COATED ORAL at 08:38

## 2024-10-29 RX ADMIN — INSULIN LISPRO 3 UNITS: 100 INJECTION, SOLUTION INTRAVENOUS; SUBCUTANEOUS at 00:44

## 2024-10-29 RX ADMIN — ALBUTEROL SULFATE 2.5 MG: 2.5 SOLUTION RESPIRATORY (INHALATION) at 08:13

## 2024-10-29 RX ADMIN — TIMOLOL MALEATE 1 DROP: 5 SOLUTION/ DROPS OPHTHALMIC at 23:11

## 2024-10-29 RX ADMIN — BRIMONIDINE TARTRATE 1 DROP: 2 SOLUTION/ DROPS OPHTHALMIC at 16:26

## 2024-10-29 RX ADMIN — HYDRALAZINE HYDROCHLORIDE 100 MG: 50 TABLET ORAL at 05:19

## 2024-10-29 RX ADMIN — CLONIDINE HYDROCHLORIDE 0.2 MG: 0.1 TABLET ORAL at 05:20

## 2024-10-29 RX ADMIN — Medication 10 ML: at 23:13

## 2024-10-29 RX ADMIN — FLUOXETINE HYDROCHLORIDE 20 MG: 20 SOLUTION ORAL at 08:40

## 2024-10-29 RX ADMIN — BARIUM SULFATE 100 ML: 0.81 POWDER, FOR SUSPENSION ORAL at 14:21

## 2024-10-29 RX ADMIN — VALPROIC ACID 150 MG: 250 SOLUTION ORAL at 23:04

## 2024-10-29 RX ADMIN — MINERAL OIL 5 ML: 1000 LIQUID ORAL at 08:40

## 2024-10-29 RX ADMIN — HYDRALAZINE HYDROCHLORIDE 100 MG: 50 TABLET ORAL at 16:26

## 2024-10-29 RX ADMIN — VALPROIC ACID 150 MG: 250 SOLUTION ORAL at 16:26

## 2024-10-29 RX ADMIN — VALPROIC ACID 150 MG: 250 SOLUTION ORAL at 05:21

## 2024-10-29 RX ADMIN — VANCOMYCIN HYDROCHLORIDE 750 MG: 750 INJECTION, POWDER, LYOPHILIZED, FOR SOLUTION INTRAVENOUS at 00:36

## 2024-10-29 RX ADMIN — MINERAL OIL 5 ML: 1000 LIQUID ORAL at 18:17

## 2024-10-29 RX ADMIN — CLONIDINE HYDROCHLORIDE 0.2 MG: 0.1 TABLET ORAL at 16:26

## 2024-10-29 RX ADMIN — LANSOPRAZOLE 30 MG: 15 TABLET, ORALLY DISINTEGRATING ORAL at 05:20

## 2024-10-29 RX ADMIN — BRIMONIDINE TARTRATE 1 DROP: 2 SOLUTION/ DROPS OPHTHALMIC at 08:39

## 2024-10-29 RX ADMIN — INSULIN LISPRO 4 UNITS: 100 INJECTION, SOLUTION INTRAVENOUS; SUBCUTANEOUS at 13:03

## 2024-10-29 RX ADMIN — ARIPIPRAZOLE 5 MG: 5 TABLET ORAL at 08:38

## 2024-10-29 RX ADMIN — INSULIN LISPRO 2 UNITS: 100 INJECTION, SOLUTION INTRAVENOUS; SUBCUTANEOUS at 06:02

## 2024-10-29 NOTE — PROGRESS NOTES
[facility]    INFECTIOUS DISEASE PROGRESS NOTE    Omkar Nava  1955  [id number]    Date of consult: 10/28/2024    Admit date: 10/20/2024    Requesting Provider: No Known Provider  Evaluating physician: Rayray Gordon MD  Reason for Consultation: Aspiration pneumonia  Chief Complaint: Above      Subjective   History of present illness:  Patient is a  69 y.o.  Yr old male with a history of dementia, diabetes mellitus type 2, blindness, essential hypertension, polysubstance use, nursing home resident, recent COVID-19 September 2024, dysphagia, GERD, who developed nausea and vomiting potentially related to intolerance of tube feedings on 10/20/2024.  Patient unable to feed himself.  Patient developed increasing shortness of breath and coughing and was admitted to [facility] on 10/20/2024 with CT findings consistent with possible pneumonia.  Urine culture on admission was also positive for Enterococcus faecalis, with negative blood cultures x 2.  Respiratory PCR positive for rhinovirus and 10/22.  MRSA screen positive.  Patient is unable to give history.  I was consulted on 10/28/2024 for further evaluation and treatment.  Patient currently on vancomycin and piperacillin/tazobactam since 10/26.  The patient had received amoxicillin 10/23 until 10/26.  He was asymptomatic from a urinary tract standpoint.    10/29/2024 history reviewed.  No high fevers or chills.  Breathing improved.  No fever.    Past Medical History:   Diagnosis Date    Anemia     Dementia     Diabetes mellitus     Dysphagia     GERD (gastroesophageal reflux disease)     History of alcohol abuse     History of cocaine use     History of marijuana use     Hypertension     Osteomyelitis     Poor historian     records obtained from nursing home records & his family    Visual impairment        Past Surgical History:   Procedure Laterality Date    AMPUTATION FOOT Left 10/18/2022    Procedure:  PARTIAL FIRST RAY AMPUTATION LEFT;  Surgeon: Yeison ePtty MD;  Location:  [facility];  Service: Orthopedics;  Laterality: Left;    AMPUTATION FOOT Left 12/5/2022    Procedure: Transmetatarsal of Left Foot;  Surgeon: Yeison Petty MD;  Location:  [facility];  Service: Orthopedics;  Laterality: Left;    ENDOSCOPY N/A 3/14/2024    Procedure: ESOPHAGOGASTRODUODENOSCOPY WITH JEJUNAL TUBE INSERTION AT BEDSIDE;  Surgeon: Brunner, Mark I, MD;  Location:  [facility];  Service: Gastroenterology;  Laterality: N/A;    ENDOSCOPY W/ PEG TUBE PLACEMENT N/A 4/1/2024    Procedure: ESOPHAGOGASTRODUODENOSCOPY WITH PERCUTANEOUS ENDOSCOPIC GASTROSTOMY TUBE INSERTION;  Surgeon: Brunner, Mark I, MD;  Location:  [facility];  Service: Gastroenterology;  Laterality: N/A;  EGD with PEG placement.  Secured at 3.5 cm    EYE SURGERY         Pediatric History   Patient Parents    Not on file     Other Topics Concern    Not on file   Social History Narrative    Caffeine 0-1 servings per day    Patient lives at home .   Previous substance use, no current substance use or cigarette use.    family history includes Diabetes in his brother, brother, father, maternal grandfather, maternal grandmother, mother, and sister; Hypertension in his brother, brother, father, and mother.    No Known Allergies    Immunization History   Administered Date(s) Administered    COVID-19 (MODERNA) 12YRS+ (SPIKEVAX) 10/16/2023, 09/24/2024    COVID-19 (MODERNA) 1st,2nd,3rd Dose Monovalent 06/09/2021, 07/19/2021    COVID-19 (MODERNA) Monovalent Original Booster 04/11/2022    Fluzone (or Fluarix & Flulaval for VFC) >6mos 11/29/2023       Medication:  @Scheduled Meds:albuterol, 2.5 mg, Nebulization, Q6H - RT  amLODIPine, 10 mg, Per PEG Tube, Q24H  ARIPiprazole, 5 mg, Per G Tube, Daily  brimonidine, 1 drop, Both Eyes, TID  carvedilol, 12.5 mg, Per PEG Tube, Q12H  cloNIDine, 0.2 mg, Per PEG Tube, Q8H  docusate sodium, 100 mg, Per G Tube, Daily  donepezil, 5 mg, Oral,  Nightly  enoxaparin, 40 mg, Subcutaneous, Daily  FLUoxetine, 20 mg, Per PEG Tube, Daily  hydrALAZINE, 100 mg, Per PEG Tube, Q8H  insulin lispro, 2-7 Units, Subcutaneous, Q6H  lansoprazole, 30 mg, Per PEG Tube, Q AM  palliative care oral rinse, 5 mL, Mouth/Throat, 4x Daily  piperacillin-tazobactam, 3.375 g, Intravenous, Q8H  polyethylene glycol, 17 g, Per G Tube, Daily  QUEtiapine, 25 mg, Per PEG Tube, Nightly  sodium chloride, 10 mL, Intravenous, Q12H  terazosin, 5 mg, Per G Tube, Q12H  timolol, 1 drop, Both Eyes, BID  Valproic Acid, 150 mg, Nasogastric, Q8H  vancomycin, 750 mg, Intravenous, Q12H      Continuous Infusions:Pharmacy to dose vancomycin,       PRN Meds:.  acetaminophen **OR** acetaminophen **OR** acetaminophen    senna-docusate sodium **AND** polyethylene glycol **AND** [DISCONTINUED] bisacodyl **AND** bisacodyl    dextrose    glucagon (human recombinant)    hydrOXYzine    Magnesium Standard Dose Replacement - Follow Nurse / BPA Driven Protocol    nitroglycerin    [DISCONTINUED] ondansetron ODT **OR** ondansetron    Pharmacy to dose vancomycin    Phosphorus Replacement - Follow Nurse / BPA Driven Protocol    Potassium Replacement - Follow Nurse / BPA Driven Protocol    sodium chloride    sodium chloride     Please refer to the medical record for a full medication list    Review of Systems:    Constitutional-- No Fever, chills or sweats.  Appetite good, and no malaise. No fatigue.  HEENT-- No new vision, hearing or throat complaints.  No epistaxis or oral sores.  Denies odynophagia or dysphagia.  No odynophagia or dysphagia. No headache, photophobia or neck stiffness.  CV-- No chest pain, palpitation or syncope  Resp--some SOB/occasional nonproductive cough/no hemoptysis  GI- No nausea, vomiting, or diarrhea.  No hematochezia, melena, or hematemesis. Denies jaundice or chronic liver disease.  -- No dysuria, hematuria, or flank pain.  Denies hesitancy, urgency.  Lymph- no swollen lymph nodes in  "neck/axilla or groin.   Heme- No active bruising or bleeding; no Hx of DVT or PE.  MS-- no swelling or pain in the bones or joints of arms/legs.  No new back pain.  Neuro-- No acute focal weakness or numbness in the arms or legs.  No seizures.  Skin--No rashes or lesions, no nodules    Physical Exam:   Vital Signs   Temp:  [97.1 °F (36.2 °C)-98.2 °F (36.8 °C)] 97.1 °F (36.2 °C)  Heart Rate:  [48-54] 48  Resp:  [16-18] 18  BP: (139-164)/(59-82) 151/59    Blood pressure 151/59, pulse (!) 48, temperature 97.1 °F (36.2 °C), temperature source Axillary, resp. rate 18, height 172.7 cm (68\"), weight 77 kg (169 lb 12.8 oz), SpO2 99%.  GENERAL: Awake and alert, in mild distress. Appears older than stated age.  Resting in bed.  HEENT:  Normocephalic, atraumatic.  Oropharynx without thrush. Dentition in good repair. No cervical adenopathy. No neck masses.  Ears externally normal, Nose externally normal.  EYES:  No conjunctival injection. No icterus. EOM full.  LYMPHATICS: No lymphadenopathy of the neck or axillary or inguinal regions.   HEART: No murmur, gallop, or pericardial friction rub. Reg rate rhythm, No JVD at 45 degrees.  LUNGS: Basilar rales. No respiratory distress, no use of accessory muscles.  ABDOMEN: Soft, nontender, nondistended. No appreciable HSM.  Bowel sounds normal.  SKIN: Warm and dry without cutaneous eruptions.  No nodules.    PSYCHIATRIC: Mental status lucid. No confusion.  EXT:  No cellulitic change.  NEURO: Oriented to name, nonfocal except blind      Results Review:   I reviewed the patient's new clinical results.  I reviewed the patient's new imaging results and agree with the interpretation.  I reviewed the patient's other test results and agree with the interpretation    Results from last 7 days   Lab Units 10/29/24  0842 10/28/24  0922 10/27/24  0850   WBC 10*3/mm3 9.63 10.38 7.53   HEMOGLOBIN g/dL 7.9* 8.2* 8.0*   HEMATOCRIT % 25.2* 25.0* 24.1*   PLATELETS 10*3/mm3 217 245 209     Results from " "last 7 days   Lab Units 10/29/24  0842   SODIUM mmol/L 138   POTASSIUM mmol/L 5.2   CHLORIDE mmol/L 104   CO2 mmol/L 26.0   BUN mg/dL 25*   CREATININE mg/dL 1.00   GLUCOSE mg/dL 195*   CALCIUM mg/dL 8.7     Results from last 7 days   Lab Units 10/29/24  0842   ALK PHOS U/L 100   BILIRUBIN mg/dL <0.2   ALT (SGPT) U/L 21   AST (SGOT) U/L 15         Results from last 7 days   Lab Units 10/29/24  0842   CRP mg/dL 1.33*     Results from last 7 days   Lab Units 10/28/24  0922   VANCOMYCIN TR mcg/mL 16.00     Results from last 7 days   Lab Units 10/25/24  2219   LACTATE mmol/L 1.0     Estimated Creatinine Clearance: 78.1 mL/min (by C-G formula based on SCr of 1 mg/dL).  CPK          9/13/2024    07:46   Common Labs   Creatine Kinase 308       Procalitonin Results:      Lab 10/28/24  0922 10/27/24  0850 10/26/24  1206   PROCALCITONIN 0.12 0.14 0.16      Brief Urine Lab Results  (Last result in the past 365 days)        Color   Clarity   Blood   Leuk Est   Nitrite   Protein   CREAT   Urine HCG        10/20/24 2123 Yellow   Clear   Negative   Negative   Negative   100 mg/dL (2+)                  No results found for: \"SITE\", \"ALLENTEST\", \"PHART\", \"WQJ5YRN\", \"PO2ART\", \"RVL1GAY\", \"BASEEXCESS\", \"R5VAQQYT\", \"HGBBG\", \"HCTABG\", \"OXYHEMOGLOBI\", \"METHHGBN\", \"CARBOXYHGB\", \"CO2CT\", \"BAROMETRIC\", \"MODALITY\", \"FIO2\"     Microbiology:      Results for orders placed or performed during the hospital encounter of 10/20/24   Blood Culture - Blood, Hand, Right    Specimen: Hand, Right; Blood   Result Value Ref Range    Blood Culture No growth at 3 days       Urine Culture          10/20/2024    21:23   Urine Culture   Urine Culture >100,000 CFU/mL Enterococcus faecalis            Brief Urine Lab Results  (Last result in the past 365 days)        Color   Clarity   Blood   Leuk Est   Nitrite   Protein   CREAT   Urine HCG        10/20/24 2123 Yellow   Clear   Negative   Negative   Negative   100 mg/dL (2+)                   Blood Culture   Date " Value Ref Range Status   10/25/2024 No growth at 2 days  Preliminary   10/25/2024 No growth at 2 days  Preliminary     Urinalysis 6-10 WBCs 10/20.    Blood Culture   Date Value Ref Range Status   10/25/2024 No growth at 2 days  Preliminary   10/25/2024 No growth at 2 days  Preliminary   (      Radiology:  Imaging Results (Last 72 Hours)       Procedure Component Value Units Date/Time    FL Video Swallow With Speech Single Contrast [[id number]] Resulted: 10/29/24 1354     Updated: 10/29/24 1422            IMPRESSION:     Likely recurrent aspiration pneumonia with episodes of nausea, vomiting, with altered mental status.  Issues with his tube feeding.  Chest x-ray slightly worse right lower lobe.  Partially treated.  Rhinovirus PCR +10/22.  MRSA colonized.  PCR +10/26.  Blood cultures x 210/25 negative.  Acute bacterial cystitis versus asymptomatic bacteriuria with urinalysis with 6-10 WBCs with Enterococcus.  Should be adequately treated with previous amoxicillin.  Anemia, chronic disease.  Slightly worse.  Leukocytosis, neutrophilic on 10/24 15,000 likely related to above issues.  Resolved.  Blindness, affects all aspects of care.  Dementia, affects all aspects of care and patient cannot self-feed.    PLAN:    Diagnostically, continue to follow patient's physical exam, CBC, CMP, CRP.  Cultures which have been obtained.    Therapeutically, change to cefuroxime and doxycycline to continue until 11/4/2024 and reassess.  Supportive care.  Room air on 10/28/2024.    I discussed the patient's findings and my recommendations with patient and nursing staff    Thank you for asking me to see Omkar Nava.  Our group would be pleased to follow this patient over the course of their hospitalization and assist with outpatient antimicrobial therapy, as indicated.  Further recommendations depend on the results of the cultures and clinical course.  Side effects of medications discussed.  The patient is at increased risk for adverse  drug reactions, complications of IV access, and readmission.    Rayray Gordon MD  10/29/2024

## 2024-10-29 NOTE — PLAN OF CARE
Goal Outcome Evaluation:                   Anticipated Discharge Disposition (SLP): skilled nursing facility          SLP Swallowing Diagnosis: mod-severe, oral dysphagia, mild, pharyngeal dysphagia (10/29/24 [id number])

## 2024-10-29 NOTE — PLAN OF CARE
Goal Outcome Evaluation:                   Anticipated Discharge Disposition (SLP): skilled nursing facility          SLP Swallowing Diagnosis: oral dysphagia, suspected pharyngeal dysphagia (10/29/24 3817)

## 2024-10-29 NOTE — CONSULTS
[facility] Neurology  Consult Note    Patient Name: Omkar Nava  : 1955  MRN: [id number]  Primary Care Physician:  Alberto Dye MD  Referring Physician: No Known Provider  Date of admission: 10/20/2024    Subjective     Reason for Consult/ Chief Complaint: Altered mental status    Omkar Nava is a 69 y.o. male with a past medical history of HTN, T2DM, CKD, vascular dementia, dysphagia, blindness and chronic iron deficiency anemia who resides in a skilled nursing facility who presented to [facility] ED after vomiting for 1 day.  Per patient's family he had been tolerating his tube feed without difficulty.  Patient's family denied any report of fevers, diarrhea or shortness of breath.  Patient spends most of his time in bed or wheelchair and is on 2 L nasal cannula baseline.  At baseline patient is able to state his name and birthday and follows very simple commands.    Patient is followed by Dr. Easley on an outpatient basis.  His most recent visit was 2024 where patient was started on Aricept 5 mg daily, Seroquel 25 mg nightly and Depakote 150 mg every 8 hours for mood.    Patient is known to neurology service line as he was admitted for altered mental status in 2024.  At that time there was questionable history of seizure patient was continued on Depakote that he was taking for behavior.  EEG was negative at that time.     General neurology was asked to see patient as he continues to struggle with behavior/agitation.  VPA level on admission 8.7.  On examination patient was calm and able to follow basic commands.  He was able to state his name and appeared to his cognitive baseline.    Review Of Systems   Constitutional: Well-developed male  Cardiovascular: Negative for chest pain or palpitations.  Respiratory: Negative for shortness of breath or cough.  Gastrointestinal: Negative for nausea and vomiting.  Genitourinary: Negative for bladder incontinence.  Musculoskeletal: Negative for  aches and pains in the muscles or joints.  Dermatological: Negative for skin breakdown.   Neurological: Negative for headache, pain, weakness or vision changes.     Personal History     Past Medical History:   Diagnosis Date    Anemia     Dementia     Diabetes mellitus     Dysphagia     GERD (gastroesophageal reflux disease)     History of alcohol abuse     History of cocaine use     History of marijuana use     Hypertension     Osteomyelitis     Poor historian     records obtained from nursing home records & his family    Visual impairment        Past Surgical History:   Procedure Laterality Date    AMPUTATION FOOT Left 10/18/2022    Procedure: PARTIAL FIRST RAY AMPUTATION LEFT;  Surgeon: Yeison Petty MD;  Location:  [facility];  Service: Orthopedics;  Laterality: Left;    AMPUTATION FOOT Left 12/5/2022    Procedure: Transmetatarsal of Left Foot;  Surgeon: Yeison Petty MD;  Location:  [facility];  Service: Orthopedics;  Laterality: Left;    ENDOSCOPY N/A 3/14/2024    Procedure: ESOPHAGOGASTRODUODENOSCOPY WITH JEJUNAL TUBE INSERTION AT BEDSIDE;  Surgeon: Brunner, Mark I, MD;  Location:  [facility];  Service: Gastroenterology;  Laterality: N/A;    ENDOSCOPY W/ PEG TUBE PLACEMENT N/A 4/1/2024    Procedure: ESOPHAGOGASTRODUODENOSCOPY WITH PERCUTANEOUS ENDOSCOPIC GASTROSTOMY TUBE INSERTION;  Surgeon: Brunner, Mark I, MD;  Location:  [facility];  Service: Gastroenterology;  Laterality: N/A;  EGD with PEG placement.  Secured at 3.5 cm    EYE SURGERY         Family History: family history includes Diabetes in his brother, brother, father, maternal grandfather, maternal grandmother, mother, and sister; Hypertension in his brother, brother, father, and mother. Otherwise pertinent FHx was reviewed and not pertinent to current issue.    Social History:  reports that he quit smoking about 7 years ago. His smoking use included cigarettes. He started smoking about 47 years ago. He has a 40 pack-year smoking history. He  "has been exposed to tobacco smoke. He has never used smokeless tobacco. He reports that he does not currently use alcohol. He reports current drug use. Drugs: Marijuana and \"Crack\" cocaine.    Home Medications:   ARIPiprazole, FLUoxetine, ProSource No Carb, QUEtiapine, Valproic Acid, Vitamin D3, acetaminophen, amLODIPine, brimonidine, carvedilol, cloNIDine, hydrALAZINE, ipratropium-albuterol, lansoprazole, metFORMIN, sennosides, terazosin, timolol, and torsemide    Current Medications:     Current Facility-Administered Medications:     acetaminophen (TYLENOL) tablet 650 mg, 650 mg, Per PEG Tube, Q4H PRN **OR** acetaminophen (TYLENOL) 160 MG/5ML oral solution 650 mg, 650 mg, Per PEG Tube, Q4H PRN **OR** acetaminophen (TYLENOL) suppository 650 mg, 650 mg, Rectal, Q4H PRN, Taryn Stover MD    albuterol (PROVENTIL) nebulizer solution 0.083% 2.5 mg/3mL, 2.5 mg, Nebulization, Q6H - RT, Taryn Stover MD, 2.5 mg at 10/29/24 0813    amLODIPine (NORVASC) tablet 10 mg, 10 mg, Per PEG Tube, Q24H, Taryn Stover MD, 10 mg at 10/29/24 0838    ARIPiprazole (ABILIFY) tablet 5 mg, 5 mg, Per G Tube, Daily, Taryn Stover MD, 5 mg at 10/29/24 0838    sennosides-docusate (PERICOLACE) 8.6-50 MG per tablet 2 tablet, 2 tablet, Per PEG Tube, BID PRN, 2 tablet at 10/27/24 1359 **AND** polyethylene glycol (MIRALAX) packet 17 g, 17 g, Per PEG Tube, Daily PRN **AND** [DISCONTINUED] bisacodyl (DULCOLAX) EC tablet 5 mg, 5 mg, Oral, Daily PRN **AND** bisacodyl (DULCOLAX) suppository 10 mg, 10 mg, Rectal, Daily PRN, Taryn Stover MD    brimonidine (ALPHAGAN) 0.2 % ophthalmic solution 1 drop, 1 drop, Both Eyes, TID, Taryn Stover MD, 1 drop at 10/29/24 0839    carvedilol (COREG) tablet 12.5 mg, 12.5 mg, Per PEG Tube, Q12H, Taryn Stover MD, 12.5 mg at 10/29/24 0838    cloNIDine (CATAPRES) tablet 0.2 mg, 0.2 mg, Per PEG Tube, Q8H, Taryn Stover MD, 0.2 mg at 10/29/24 " 0520    dextrose (D50W) (25 g/50 mL) IV injection 25 g, 25 g, Intravenous, Q15 Min PRN, Taryn Stover MD, 25 g at 10/21/24 0637    docusate sodium (COLACE) liquid 100 mg, 100 mg, Per G Tube, Daily, Farhana Lion MD, 100 mg at 10/29/24 0954    Enoxaparin Sodium (LOVENOX) syringe 40 mg, 40 mg, Subcutaneous, Daily, Taryn Stover MD, 40 mg at 10/29/24 0838    FLUoxetine (PROzac) 20 MG/5ML solution 20 mg, 20 mg, Per PEG Tube, Daily, Tarny Stover MD, 20 mg at 10/29/24 0840    glucagon (GLUCAGEN) injection 1 mg, 1 mg, Intramuscular, Q15 Min PRN, Taryn Stover MD    hydrALAZINE (APRESOLINE) tablet 100 mg, 100 mg, Per PEG Tube, Q8H, Taryn Stover MD, 100 mg at 10/29/24 0519    hydrOXYzine (ATARAX) tablet 25 mg, 25 mg, Per PEG Tube, TID PRN, Taryn Stover MD, 25 mg at 10/28/24 1256    Insulin Lispro (humaLOG) injection 2-7 Units, 2-7 Units, Subcutaneous, Q6H, Ryne Burgos DO, 2 Units at 10/29/24 0602    lansoprazole (PREVACID SOLUTAB) disintegrating tablet Tablet Delayed Release Dispersible 30 mg, 30 mg, Per PEG Tube, Q AM, Taryn Stover MD, 30 mg at 10/29/24 0520    Magnesium Standard Dose Replacement - Follow Nurse / BPA Driven Protocol, , Does not apply, PRN, Taryn Stover MD    nitroglycerin (NITROSTAT) SL tablet 0.4 mg, 0.4 mg, Sublingual, Q5 Min PRN, Taryn Stover MD    [DISCONTINUED] ondansetron ODT (ZOFRAN-ODT) disintegrating tablet 4 mg, 4 mg, Oral, Q6H PRN **OR** ondansetron (ZOFRAN) injection 4 mg, 4 mg, Intravenous, Q6H PRN, Taryn Stover MD, 4 mg at 10/27/24 1800    palliative care oral rinse, 5 mL, Mouth/Throat, 4x Daily, Taryn Stover MD, 5 mL at 10/29/24 0840    Pharmacy to dose vancomycin, , Does not apply, Continuous PRN, Ryne Burgos, DO    Phosphorus Replacement - Follow Nurse / BPA Driven Protocol, , Does not apply, PRN, Taryn Stover MD    piperacillin-tazobactam (ZOSYN) 3.375 g IVPB  in 100 mL NS MBP (CD), 3.375 g, Intravenous, Q8H, Ryne Burgos, , 3.375 g at 10/29/24 1014    polyethylene glycol (MIRALAX) packet 17 g, 17 g, Per G Tube, Daily, Farhana Lion MD, 17 g at 10/29/24 0954    Potassium Replacement - Follow Nurse / BPA Driven Protocol, , Does not apply, PRN, Taryn Stover MD    QUEtiapine (SEROquel) tablet 25 mg, 25 mg, Per PEG Tube, Nightly, Taryn Stover MD, 25 mg at 10/28/24 2130    sodium chloride 0.9 % flush 10 mL, 10 mL, Intravenous, PRN, Taryn Stover MD    sodium chloride 0.9 % flush 10 mL, 10 mL, Intravenous, Q12H, Taryn Stover MD, 10 mL at 10/29/24 0840    sodium chloride 0.9 % flush 10 mL, 10 mL, Intravenous, PRN, Taryn Stover MD    terazosin (HYTRIN) capsule 5 mg, 5 mg, Per G Tube, Q12H, Taryn Stover MD, 5 mg at 10/29/24 0838    timolol (TIMOPTIC) 0.5 % ophthalmic solution 1 drop, 1 drop, Both Eyes, BID, Taryn Stover MD, 1 drop at 10/29/24 0839    Valproic Acid (DEPAKENE) syrup 150 mg, 150 mg, Nasogastric, Q8H, Taryn Stover MD, 150 mg at 10/29/24 0521    vancomycin 750 mg in sodium chloride 0.9 % 250 mL IVPB-VTB, 750 mg, Intravenous, Q12H, Hero Ty, PharmD, Last Rate: 333.3 mL/hr at 10/29/24 0036, 750 mg at 10/29/24 0036     Allergies:  No Known Allergies    Objective     Physical Exam  Vitals and nursing note reviewed.   Constitutional:       General: He is not in acute distress.     Appearance: He is not ill-appearing.   Eyes:      Extraocular Movements: Extraocular movements intact.      Pupils: Pupils are equal, round, and reactive to light.      Comments: No nystagmus or deviated gaze noted   Neurological:      Mental Status: He is alert. Mental status is at baseline.      Cranial Nerves: No cranial nerve deficit, dysarthria or facial asymmetry.      Sensory: No sensory deficit.      Motor: Weakness present. No tremor or seizure activity.      Deep Tendon Reflexes: Babinski sign  "absent on the right side. Babinski sign absent on the left side.      Reflex Scores:       Bicep reflexes are 1+ on the right side and 1+ on the left side.       Patellar reflexes are 1+ on the right side and 1+ on the left side.     Comments:     Cranial Nerves   CN II: Pupils are equal, round, and reactive to light. Normal visual acuity and visual fields.    CN III IV VI: Extraocular movements are full without nystagmus.  CN V: Normal facial sensation and strength of muscles of mastication.  CN VII: Facial movements are symmetric. No weakness.  CN VIII:  Auditory acuity is normal.  CN IX & X:  Symmetric palatal movement.  CN XII: The tongue is midline.  No atrophy or fasciculations.    Generalized weakness         Vitals:  Temp:  [97.5 °F (36.4 °C)-98.2 °F (36.8 °C)] 97.5 °F (36.4 °C)  Heart Rate:  [50-54] 53  Resp:  [16-18] 16  BP: (139-164)/(61-82) 164/73    Laboratory Results:   Lab Results   Component Value Date    GLUCOSE 195 (H) 10/29/2024    CALCIUM 8.7 10/29/2024     10/29/2024    K 5.2 10/29/2024    CO2 26.0 10/29/2024     10/29/2024    BUN 25 (H) 10/29/2024    CREATININE 1.00 10/29/2024    EGFRIFAFRI >60 07/25/2022    EGFRIFNONA >60 07/25/2022    BCR 25.0 10/29/2024    ANIONGAP 8.0 10/29/2024     Lab Results   Component Value Date    WBC 9.63 10/29/2024    HGB 7.9 (L) 10/29/2024    HCT 25.2 (L) 10/29/2024    MCV 95.1 10/29/2024     10/29/2024     No results found for: \"CHOL\"  Lab Results   Component Value Date    HDL 44 06/25/2022     Lab Results   Component Value Date    .4 (H) 06/25/2022     Lab Results   Component Value Date    TRIG 32 04/01/2024    TRIG 106 03/25/2024    TRIG 58 06/25/2022     Lab Results   Component Value Date    HGBA1C 5.60 09/15/2024     Lab Results   Component Value Date    INR 1.05 03/29/2024    INR 1.38 (H) 03/12/2024    INR 1.08 10/18/2022    PROTIME 13.8 03/29/2024    PROTIME 17.1 (H) 03/12/2024    PROTIME 13.9 10/18/2022     Lab Results   Component " Value Date    UIWAOVVN77 1,158 (H) 09/11/2024     Lab Results   Component Value Date    FOLATE 12.10 09/11/2024             Assessment / Plan   Brief Patient Summary:  Omkar Nava is a 69 y.o. male with a past medical history of HTN, T2DM, CKD, vascular dementia, dysphagia, blindness and chronic iron deficiency anemia who resides in a skilled nursing facility who presented to [facility] ED after vomiting for 1 day.  Patient's hospital course continues to be a complicated with after mental status and agitation.    Plan:   Dementia with agitation  Encephalopathy secondary to infection-improving  Continue Depakote 150 mg 3 times daily  VPA level uptrending over 8.7 -> 28.7  ID following appreciate recommendations  Continue home Abilify 5 mg daily  Continue Prozac 20 mg daily  Continue home Seroquel 25 mg nightly  Continue Aricept 5 mg daily  EKG for QTc assessment  Patient to continue to work with PT/OT/ST  General Neurology will continue to follow    I have discussed the above with the patient, bedside RN and Dr. Lion  Time spent with patient: 50 minutes in face-to-face evaluation and management of the patient.     Copied text in this note has been reviewed and is accurate as of 10/29/24.     NARGIS Sheridan

## 2024-10-29 NOTE — PLAN OF CARE
Goal Outcome Evaluation:  Plan of Care Reviewed With: patient        Progress: improving  Outcome Evaluation: Pt was able to demonstrate increased ability to follow commands. In addition pt was able to complete SPT transfer to bedside chair however requried maxAx2, BUE support, and B knee blocking due to weakness. Pt would benefit from continued skilled therapy while hospitalized to maximize functional independence. D/c rec remains SNF for best outcome.    Anticipated Discharge Disposition (PT): skilled nursing facility

## 2024-10-29 NOTE — CASE MANAGEMENT/SOCIAL WORK
Case Management Discharge Note    Final Note: Mr. Nava will be discharging to [facility] for skilled rehab before returning to his LTC bed tomorrow 10/30 if medically ready. Confirmed with Sheila with facility. Attempted to update Mr. Nava's Daughter Idalia and left voicemail. He will be transported by [facility] tomorrow 10/30 at 1:00pm. PCS form has been faxed to EMS. Pharmacy used by facility is [facility]. Please call report to [phone].  [facility] Liaison, Sheila, will receive discharge summary from [facility] so no need to fax.    Note update 10/30 7066: Transport time has changed from 1:00 pm to 6:45pm. Attempted to update Tristan Friedman by telephone and left a voicemail.     Note update 10/30 2233: Discharge has been cancelled for today. CM will follow up tomorrow and arrange ambulance transport if medically ready for discharge. Updated Tristan Friedman by telephone.            Selected Continued Care - Admitted Since 10/20/2024       Destination Coordination complete.      Service Provider Services Address Phone Fax Patient Preferred    [facility] [address], [facility] [address] [phone] --                            Transportation Services  Ambulance: [facility]    Final Discharge Disposition Code: 03 - skilled nursing facility (SNF)

## 2024-10-29 NOTE — THERAPY RE-EVALUATION
Acute Care - Speech Language Pathology   Swallow Re-Evaluation [facility]  Clinical Swallow Evaluation         Patient Name: Omkar Nava  : 1955  MRN: [id number]  Today's Date: 10/29/2024               Admit Date: 10/20/2024    Visit Dx:     ICD-10-CM ICD-9-CM   1. Acute cystitis without hematuria  N30.00 595.0   2. Pneumonia due to infectious organism, unspecified laterality, unspecified part of lung  J18.9 486   3. 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, without long-term current use of insulin    Acute osteomyelitis of left foot    Type 2 diabetes mellitus    Essential hypertension    Psychotic disorder    Neurocognitive disorder    S/P transmetatarsal amputation of foot, left    Abscess of left foot    Anemia of chronic disease    Aspiration pneumonia    Cervical spine pain    Chronic kidney disease    Closed head injury without loss of consciousness    Dementia    Dental cavities    Diarrhea of presumed infectious origin    Displaced fracture of proximal phalanx of left great toe, initial encounter for open fracture    Dysphagia    Erectile dysfunction    Generalized muscle weakness    Hallucinations    Hypertensive heart and chronic kidney disease 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 failure, with stage 1 through stage 4 chronic kidney disease, or unspecified chronic kidney disease    Insomnia due to medical condition    Iron deficiency anemia    Laceration without foreign body, left foot, subsequent encounter    Long term (current) use of insulin    Personal history of Methicillin resistant Staphylococcus aureus infection    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    Protein calorie malnutrition    Simple chronic bronchitis    Tobacco use    Type 2 diabetes mellitus with diabetic neuropathy, unspecified    Type 2 diabetes mellitus with diabetic chronic  kidney disease    Acute type 1 respiratory failure    Severe vascular dementia without behavioral disturbance, psychotic disturbance, mood disturbance, or anxiety    UTI (urinary tract infection)    Vomiting    Acute encephalopathy    Hypothermia     Past Medical History:   Diagnosis Date    Anemia     Dementia     Diabetes mellitus     Dysphagia     GERD (gastroesophageal reflux disease)     History of alcohol abuse     History of cocaine use     History of marijuana use     Hypertension     Osteomyelitis     Poor historian     records obtained from nursing home records & his family    Visual impairment      Past Surgical History:   Procedure Laterality Date    AMPUTATION FOOT Left 10/18/2022    Procedure: PARTIAL FIRST RAY AMPUTATION LEFT;  Surgeon: Yeison Petty MD;  Location:  [facility];  Service: Orthopedics;  Laterality: Left;    AMPUTATION FOOT Left 12/5/2022    Procedure: Transmetatarsal of Left Foot;  Surgeon: Yeison Petty MD;  Location:  [facility];  Service: Orthopedics;  Laterality: Left;    ENDOSCOPY N/A 3/14/2024    Procedure: ESOPHAGOGASTRODUODENOSCOPY WITH JEJUNAL TUBE INSERTION AT BEDSIDE;  Surgeon: Brunner, Mark I, MD;  Location:  [facility];  Service: Gastroenterology;  Laterality: N/A;    ENDOSCOPY W/ PEG TUBE PLACEMENT N/A 4/1/2024    Procedure: ESOPHAGOGASTRODUODENOSCOPY WITH PERCUTANEOUS ENDOSCOPIC GASTROSTOMY TUBE INSERTION;  Surgeon: Brunner, Mark I, MD;  Location:  [facility];  Service: Gastroenterology;  Laterality: N/A;  EGD with PEG placement.  Secured at 3.5 cm    EYE SURGERY         SLP Recommendation and Plan  SLP Swallowing Diagnosis: oral dysphagia, suspected pharyngeal dysphagia (10/29/24 0910)  SLP Diet Recommendation: NPO, long term alternate methods of nutrition/hydration (10/29/24 0910)     SLP Rec. for Method of Medication Administration: meds via alternate route (10/29/24 0910)     Monitor for Signs of Aspiration: notify SLP if any concerns (10/29/24  0910)  Recommended Diagnostics: reassess via [facility]FSS ([facility]) (10/29/24 0910)  Swallow Criteria for Skilled Therapeutic Interventions Met: demonstrates skilled criteria (10/29/24 0910)  Anticipated Discharge Disposition (SLP): skilled nursing facility (10/29/24 0910)  Rehab Potential/Prognosis, Swallowing: re-evaluate goals as necessary (10/29/24 0910)     Predicted Duration Therapy Intervention (Days): 2 weeks (10/29/24 0910)  Oral Care Recommendations: Oral Care BID/PRN, Suction toothbrush (10/29/24 0910)                                               SWALLOW EVALUATION (Last 72 Hours)       SLP Adult Swallow Evaluation       Row Name 10/29/24 0910 10/28/24 0830                Rehab Evaluation    Document Type re-evaluation  - re-evaluation  -       Subjective Information no complaints  - no complaints  -       Patient Observations alert;cooperative  - lethargic;cooperative  -       Patient/Family/Caregiver Comments/Observations No family present  - no family present  -       Patient Effort good  - fair  -       Symptoms Noted During/After Treatment none  - none  -          General Information    Patient Profile Reviewed yes  - yes  -       Pertinent History Of Current Problem See initial eval  - see initial eval  -       Current Method of Nutrition NPO;gastrostomy feedings  - NPO;gastrostomy feedings  -       Precautions/Limitations, Vision vision impairment, bilaterally  - vision impairment, bilaterally  -       Precautions/Limitations, Hearing WFL;for purposes of eval  - WFL;for purposes of eval  -CJ       Prior Level of Function-Communication cognitive-linguistic impairment  - cognitive-linguistic impairment  -       Prior Level of Function-Swallowing mechanical ground textures;thin liquids;alternative feeding method;other (see comments)  - mechanical ground textures;thin liquids;alternative feeding method;other (see comments)  -       Plans/Goals Discussed with  patient  - patient  -       Barriers to Rehab medically complex;visual deficit;previous functional deficit;cognitive status  - medically complex;visual deficit;previous functional deficit;cognitive status  -       Patient's Goals for Discharge patient did not state  - patient did not state  -          Pain    Additional Documentation Pain Scale: FACES Pre/Post-Treatment (Group)  - --          Pain Scale: FACES Pre/Post-Treatment    Pain: FACES Scale, Pretreatment 0-->no hurt  - 0-->no hurt  -       Posttreatment Pain Rating 0-->no hurt  - 0-->no hurt  -          Oral Motor Structure and Function    Dentition Assessment missing teeth  - missing teeth  -       Secretion Management anterior loss  - anterior loss  -       Mucosal Quality sticky;dry  - sticky  -          Oral Musculature and Cranial Nerve Assessment    Oral Motor General Assessment generalized oral motor weakness  - generalized oral motor weakness  -          General Eating/Swallowing Observations    Respiratory Support Currently in Use room air  - nasal cannula  -       Eating/Swallowing Skills fed by SLP;unable to perform self-feeding  - fed by SLP;unable to perform self-feeding  -       Positioning During Eating upright 90 degree;upright in bed  - upright 90 degree;upright in bed  -       Utensils Used spoon;cup;straw  - spoon;cup  -       Consistencies Trialed ice chips;thin liquids;nectar/syrup-thick liquids;pureed  - pureed;ice chips;thin liquids;nectar/syrup-thick liquids  -          Clinical Swallow Eval    Oral Prep Phase impaired  - impaired  -       Oral Transit impaired  - impaired  -       Oral Residue impaired  - impaired  -       Pharyngeal Phase suspected pharyngeal impairment  - suspected pharyngeal impairment  -       Clinical Swallow Evaluation Summary Pt awake/alert this AM, able to accept all PO presentations. Intermittent throat clear w/ thins & wet vocal  quality upon completion of thin/NTL trials. No overt s/s of aspiration w/ pureed trials. Cont w/ oral phase deficits, suspect generalized weakness/cognitive deficits impacting. Recommend cont NPO w/ PEG. Plan for MBS this PM  - --          Oral Prep Concerns    Oral Prep Concerns oral holding;anterior loss;incomplete or weak lip closure around spoon;increased prep time  - increased prep time;incomplete or weak lip closure around spoon  -       Oral Holding pudding  - --       Incomplete or Weak Lip Closure Around Spoon thin;nectar  - --       Anterior Loss thin;nectar  - --       Increased Prep Time pudding  - pudding  -          Oral Transit Concerns    Oral Transit Concerns delayed initiation of bolus transit  - delayed initiation of bolus transit  -       Delayed Intiation of Bolus Transit all consistencies  - all consistencies  -          Pharyngeal Phase Concerns    Pharyngeal Phase Concerns throat clear  - wet vocal quality  -       Wet Vocal Quality thin;nectar  - thin;nectar  -       Throat Clear thin  - --       Pharyngeal Phase Concerns, Comment -- Pt continus w/ limited participation and minimal po presentations accepted. Initially was agreeable however pt quickly started to refuse further presentations. Continues w/ overt s/s of aspiration w/ thins/nectar, refused puree presentations. Pt felt to be at high risk and given limited participation not appropriate for instrumental re-evaluation. Continue NPO w/ PEG  -          SLP Evaluation Clinical Impression    SLP Swallowing Diagnosis oral dysphagia;suspected pharyngeal dysphagia  - oral dysphagia;suspected pharyngeal dysphagia  -       Functional Impact risk of aspiration/pneumonia  - risk of aspiration/pneumonia;risk of malnutrition  -       Rehab Potential/Prognosis, Swallowing re-evaluate goals as necessary  - re-evaluate goals as necessary  -       Swallow Criteria for Skilled Therapeutic Interventions  Met demonstrates skilled criteria  - demonstrates skilled criteria  -          Recommendations    Therapy Frequency (Swallow) -- 5 days per week  -       Predicted Duration Therapy Intervention (Days) 2 weeks  - 2 weeks  -       SLP Diet Recommendation NPO;long term alternate methods of nutrition/hydration  - NPO;long term alternate methods of nutrition/hydration  -       Recommended Diagnostics reassess via VFSS ([facility])  - reassess via clinical swallow evaluation  10/29  -       Recommended Precautions and Strategies -- general aspiration precautions  -       Oral Care Recommendations Oral Care BID/PRN;Suction toothbrush  - Oral Care BID/PRN;Suction toothbrush  -       SLP Rec. for Method of Medication Administration meds via alternate route  - meds via alternate route  -       Monitor for Signs of Aspiration notify SLP if any concerns  - notify SLP if any concerns  -       Anticipated Discharge Disposition (SLP) skilled nursing facility  - skilled nursing [facility]  -                 User Key  (r) = Recorded By, (t) = Taken By, (c) = Cosigned By      Initials Name Effective Dates     Eliz Cartagena, MS CCC-SLP 10/22/24 -      Kathy Gonzales MS CCC-SLP 05/12/23 -                     EDUCATION  The patient has been educated in the following areas:   Dysphagia (Swallowing Impairment) Oral Care/Hydration NPO rationale.                Time Calculation:    Time Calculation- SLP       Row Name 10/29/24 1013             Time Calculation- [facility]    SLP Start Time 0910  -      SLP Received On 10/29/24  -         Untimed Charges    07531-CS Eval Oral Pharyng Swallow Minutes 40  -         Total Minutes    Untimed Charges Total Minutes 40  -       Total Minutes 40  -                User Key  (r) = Recorded By, (t) = Taken By, (c) = Cosigned By      Initials Name Provider Type     Kathy Gonzales, MS CCC-SLP Speech and Language Pathologist                    Therapy Charges  for Today       Code Description Service Date Service Provider Modifiers Qty    [id number] HC ST EVAL ORAL PHARYNG SWALLOW 3 10/29/2024 Kathy Gonzales, MS CCC-SLP GN 1                 Kathy Gonzales MS CCC-SLP  10/29/2024

## 2024-10-29 NOTE — MBS/VFSS/FEES
Acute Care - Speech Language Pathology   Swallow Initial Evaluation  Geraldo  Modified Barium Swallow Study (MBS)       Patient Name: Omkar Nava  : 1955  MRN: [id number]  Today's Date: 10/29/2024               Admit Date: 10/20/2024    Visit Dx:     ICD-10-CM ICD-9-CM   1. Acute cystitis without hematuria  N30.00 595.0   2. Pneumonia due to infectious organism, unspecified laterality, unspecified part of lung  J18.9 486   3. 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, without long-term current use of insulin    Acute osteomyelitis of left foot    Type 2 diabetes mellitus    Essential hypertension    Psychotic disorder    Neurocognitive disorder    S/P transmetatarsal amputation of foot, left    Abscess of left foot    Anemia of chronic disease    Aspiration pneumonia    Cervical spine pain    Chronic kidney disease    Closed head injury without loss of consciousness    Dementia    Dental cavities    Diarrhea of presumed infectious origin    Displaced fracture of proximal phalanx of left great toe, initial encounter for open fracture    Dysphagia    Erectile dysfunction    Generalized muscle weakness    Hallucinations    Hypertensive heart and chronic kidney disease 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 failure, with stage 1 through stage 4 chronic kidney disease, or unspecified chronic kidney disease    Insomnia due to medical condition    Iron deficiency anemia    Laceration without foreign body, left foot, subsequent encounter    Long term (current) use of insulin    Personal history of Methicillin resistant Staphylococcus aureus infection    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    Protein calorie malnutrition    Simple chronic bronchitis    Tobacco use    Type 2 diabetes mellitus with diabetic neuropathy, unspecified    Type 2 diabetes mellitus with  diabetic chronic kidney disease    Acute type 1 respiratory failure    Severe vascular dementia without behavioral disturbance, psychotic disturbance, mood disturbance, or anxiety    UTI (urinary tract infection)    Vomiting    Acute encephalopathy    Hypothermia     Past Medical History:   Diagnosis Date    Anemia     Dementia     Diabetes mellitus     Dysphagia     GERD (gastroesophageal reflux disease)     History of alcohol abuse     History of cocaine use     History of marijuana use     Hypertension     Osteomyelitis     Poor historian     records obtained from nursing home records & his family    Visual impairment      Past Surgical History:   Procedure Laterality Date    AMPUTATION FOOT Left 10/18/2022    Procedure: PARTIAL FIRST RAY AMPUTATION LEFT;  Surgeon: Yeison Petty MD;  Location:  [facility];  Service: Orthopedics;  Laterality: Left;    AMPUTATION FOOT Left 12/5/2022    Procedure: Transmetatarsal of Left Foot;  Surgeon: Yeison Petty MD;  Location:  [facility];  Service: Orthopedics;  Laterality: Left;    ENDOSCOPY N/A 3/14/2024    Procedure: ESOPHAGOGASTRODUODENOSCOPY WITH JEJUNAL TUBE INSERTION AT BEDSIDE;  Surgeon: Brunner, Mark I, MD;  Location:  [facility];  Service: Gastroenterology;  Laterality: N/A;    ENDOSCOPY W/ PEG TUBE PLACEMENT N/A 4/1/2024    Procedure: ESOPHAGOGASTRODUODENOSCOPY WITH PERCUTANEOUS ENDOSCOPIC GASTROSTOMY TUBE INSERTION;  Surgeon: Brunner, Mark I, MD;  Location:  [facility];  Service: Gastroenterology;  Laterality: N/A;  EGD with PEG placement.  Secured at 3.5 cm    EYE SURGERY         SLP Recommendation and Plan  SLP Swallowing Diagnosis: mod-severe, oral dysphagia, mild, pharyngeal dysphagia (10/29/24 1420)  SLP Diet Recommendation: puree, thin liquids, long term alternate methods of nutrition/hydration, other (see comments) (+ PEG to ensure nutritional needs met) (10/29/24 1420)  Recommended Precautions and Strategies: general aspiration precautions, 1:1  supervision, assist with feeding, small bites of food and sips of liquid, no straw, check mouth frequently for oral residue/pocketing, reflux precautions (10/29/24 1420)  SLP Rec. for Method of Medication Administration: meds via alternate route (10/29/24 1420)     Monitor for Signs of Aspiration: notify SLP if any concerns (10/29/24 1420)  Recommended Diagnostics: reassess via VFSS ([facility]) (10/29/24 0910)  Swallow Criteria for Skilled Therapeutic Interventions Met: demonstrates skilled criteria (10/29/24 1420)  Anticipated Discharge Disposition (SLP): skilled nursing facility (10/29/24 1420)  Rehab Potential/Prognosis, Swallowing: re-evaluate goals as necessary (10/29/24 1420)  Therapy Frequency (Swallow): 5 days per week (10/29/24 1420)  Predicted Duration Therapy Intervention (Days): 2 weeks (10/29/24 1420)  Oral Care Recommendations: Oral Care BID/PRN, Suction toothbrush (10/29/24 1420)                                               SWALLOW EVALUATION (Last 72 Hours)       SLP Adult Swallow Evaluation       Row Name 10/29/24 1420 10/29/24 0910 10/28/24 0830             Rehab Evaluation    Document Type evaluation  - re-evaluation  - re-evaluation  -      Subjective Information no complaints  - no complaints  - no complaints  -      Patient Observations alert;cooperative  - alert;cooperative  - lethargic;cooperative  -      Patient/Family/Caregiver Comments/Observations No family present  - No family present  - no family present  -      Patient Effort good  - good  - fair  -      Symptoms Noted During/After Treatment none  - none  - none  -         General Information    Patient Profile Reviewed yes  - yes  - yes  -      Pertinent History Of Current Problem See initial eval, pt referred for [facility]  - See initial eval  - see initial eval  -      Current Method of Nutrition NPO;gastrostomy feedings  - NPO;gastrostomy feedings  - NPO;gastrostomy feedings  -       Precautions/Limitations, Vision vision impairment, bilaterally  - vision impairment, bilaterally  - vision impairment, bilaterally  -      Precautions/Limitations, Hearing WFL;for purposes of eval  - WFL;for purposes of [facility] WFL;for purposes of eval  -      Prior Level of Function-Communication cognitive-linguistic impairment  - cognitive-linguistic impairment  - cognitive-linguistic impairment  -      Prior Level of Function-Swallowing mechanical ground textures;thin liquids;alternative feeding method;other (see comments)  - mechanical ground textures;thin liquids;alternative feeding method;other (see comments)  - mechanical ground textures;thin liquids;alternative feeding method;other (see comments)  -      Plans/Goals Discussed with patient  - patient  - patient  -      Barriers to Rehab medically complex;visual deficit;previous functional deficit;cognitive status  - medically complex;visual deficit;previous functional deficit;cognitive status  - medically complex;visual deficit;previous functional deficit;cognitive status  -      Patient's Goals for Discharge patient did not state  - patient did not state  - patient did not state  -         Pain    Additional Documentation Pain Scale: FACES Pre/Post-Treatment (Group)  - Pain Scale: FACES Pre/Post-Treatment (Group)  - --         Pain Scale: FACES Pre/Post-Treatment    Pain: FACES Scale, Pretreatment 0-->no hurt  - 0-->no hurt  - 0-->no hurt  -      Posttreatment Pain Rating 0-->no hurt  - 0-->no hurt  - 0-->no hurt  -         Oral Motor Structure and Function    Dentition Assessment -- missing teeth  - missing teeth  -      Secretion Management -- anterior loss  - anterior loss  -      Mucosal Quality -- sticky;dry  - sticky  -CJ         Oral Musculature and Cranial Nerve Assessment    Oral Motor General Assessment -- generalized oral motor weakness  - generalized oral motor weakness  -CJ          General Eating/Swallowing Observations    Respiratory Support Currently in Use -- room air  - nasal cannula  -      Eating/Swallowing Skills -- fed by SLP;unable to perform self-feeding  - fed by SLP;unable to perform self-feeding  -      Positioning During Eating -- upright 90 degree;upright in bed  - upright 90 degree;upright in bed  -      Utensils Used -- spoon;cup;straw  - spoon;cup  -      Consistencies Trialed -- ice chips;thin liquids;nectar/syrup-thick liquids;pureed  - pureed;ice chips;thin liquids;nectar/syrup-thick liquids  -         Clinical Swallow Eval    Oral Prep Phase -- impaired  - impaired  -      Oral Transit -- impaired  - impaired  -      Oral Residue -- impaired  - impaired  -      Pharyngeal Phase -- suspected pharyngeal impairment  - suspected pharyngeal impairment  -      Clinical Swallow Evaluation Summary -- Pt awake/alert this AM, able to accept all PO presentations. Intermittent throat clear w/ thins & wet vocal quality upon completion of thin/NTL trials. No overt s/s of aspiration w/ pureed trials. Cont w/ oral phase deficits, suspect generalized weakness/cognitive deficits impacting. Recommend cont NPO w/ PEG. Plan for [facility] this [facility] --         Oral Prep Concerns    Oral Prep Concerns -- oral holding;anterior loss;incomplete or weak lip closure around spoon;increased prep time  - increased prep time;incomplete or weak lip closure around spoon  -      Oral Holding -- pudding  - --      Incomplete or Weak Lip Closure Around Spoon -- thin;nectar  - --      Anterior Loss -- thin;nectar  - --      Increased Prep Time -- pudding  - pudding  -         Oral Transit Concerns    Oral Transit Concerns -- delayed initiation of bolus transit  - delayed initiation of bolus transit  -      Delayed Intiation of Bolus Transit -- all consistencies  - all consistencies  -         Pharyngeal Phase Concerns    Pharyngeal Phase Concerns --  throat clear  - wet vocal quality  -      Wet Vocal Quality -- thin;nectar  - thin;nectar  -      Throat Clear -- thin  - --      Pharyngeal Phase Concerns, Comment -- -- Pt continus w/ limited participation and minimal po presentations accepted. Initially was agreeable however pt quickly started to refuse further presentations. Continues w/ overt s/s of aspiration w/ thins/nectar, refused puree presentations. Pt felt to be at high risk and given limited participation not appropriate for instrumental re-evaluation. Continue NPO w/ PEG  -         MBS/VFSS    Utensils Used spoon;straw;cup  - -- --      Consistencies Trialed thin liquids;pudding thick  - -- --         MBS/VFSS Interpretation    Oral Prep Phase impaired oral phase of swallowing  - -- --      Oral Transit Phase impaired  - -- --      Oral Residue impaired  - -- --      VFSS Summary Moderate to severe oral and mild pharyngeal dysphagia. Prespill w/ thins to the pyriforms. Suspect cognitive status impacting severity of oral phase. Silent aspiration of thins via straw after the swallow x1, u/a to replicate accross additional trials. Intermittent, high/transient penetration w/ thins via cup presentation; all material observed to fully clear upon completion of the swallow. Solid trials deferred 2' cog status & severity of oral phase deficits. Although pt safe for PO diet, suspect will still need PEG given concern that pt will be u/a to meet nutrition needs w/ PO alone. Ok to initiate pureed diet w/ thin liquids, no straw, 1:1 supervision/assistance, small/single bites & sip, check for pocketing/residue  - -- --         Oral Preparatory Phase    Oral Preparatory Phase reduced lip opening;reduced lip closure around utensil;anterior loss;oral holding;prolonged manipulation  - -- --      Reduced Lip Opening all consistencies tested;secondary to impaired cognitive status;secondary to reduced lingual range of motion;secondary to reduced  lingual strength  - -- --      Reduced Lip Closure Around Utensil all consistencies tested;secondary to impaired cognitive status;secondary to reduced lingual strength;secondary to reduced lingual range of motion  - -- --      Anterior Loss thin liquids;secondary to impaired cognitive status;secondary to reduced lingual range of motion;secondary to reduced lingual strength;secondary to reduced labial seal  - -- --      Oral Holding all consistencies tested;secondary to impaired cognitive status;secondary to reduced lingual strength;secondary to reduced lingual range of motion  - -- --      Prolonged Manipulation pudding/puree;secondary to impaired cognitive status;secondary to reduced lingual range of motion;secondary to reduced lingual strength  - -- --         Oral Transit Phase    Impaired Oral Transit Phase tongue pumping;premature spillage of liquids into pharynx  - -- --      Tongue Pumping all consistencies tested;secondary to impaired cognitive status;discoordination of lingual movement;secondary to reduced lingual control  - -- --      Premature Spillage of Liquids into Pharynx thin liquids;secondary to impaired cognitive status;discoordination of lingual movement;secondary to reduced lingual control  - -- --         Oral Residue    Impaired Oral Residue diffuse residue throughout oral cavity  - -- --      Diffuse Residue throughout Oral Cavity pudding/puree;secondary to reduced lingual range of motion;secondary to reduced lingual strength  - -- --      Response to Oral Residue partial residue clearance;with liquid wash;with spontaneous subsequent swallow  - -- --         Initiation of Pharyngeal Swallow    Initiation of Pharyngeal Swallow bolus in pyriform sinuses  - -- --      Pharyngeal Phase impaired pharyngeal phase of swallowing  - -- --      Aspiration During the Swallow --  - -- --      Aspiration After the Swallow thin liquids;secondary to residue;in valleculae;in  pyriform sinuses;in laryngeal vestibule;other (see comments)  via straw only  - -- --      Depth of Penetration deep  - -- --      Response to Penetration No  - -- --      No spontaneous response to penetration and could not produce cough response despite cue  - -- --      Response to Aspiration No  - -- --      No spontaneous response to aspiration and could not produce cough response despite cue  - -- --      Rosenbek's Scale thin:;8--->level 8;pudding/puree:;1--->level 1  thin level 8 via straw only  - -- --      Pharyngeal Residue all consistencies tested;diffuse within pharynx;secondary to reduced hyolaryngeal excursion;secondary to reduced laryngeal elevation;secondary to reduced posterior pharyngeal wall stripping  - -- --      Response to Residue partial residue clearance  - -- --      Attempted Compensatory Maneuvers additional subsequent swallow;effortful (hard swallow);bolus size;bolus presentation style;other (see comments)  Did not attempt more complex compensations 2' cog status  - -- --      Successful Compensatory Maneuver Competency patient unable to adequately demonstrate/teach back compensations  - -- --         Esophageal Phase    Esophageal Phase esophageal retention  - -- --         SLP Evaluation Clinical Impression    SLP Swallowing Diagnosis mod-severe;oral dysphagia;mild;pharyngeal dysphagia  - oral dysphagia;suspected pharyngeal dysphagia  - oral dysphagia;suspected pharyngeal dysphagia  -      Functional Impact risk of aspiration/pneumonia  - risk of aspiration/pneumonia  - risk of aspiration/pneumonia;risk of malnutrition  -      Rehab Potential/Prognosis, Swallowing re-evaluate goals as necessary  - re-evaluate goals as necessary  - re-evaluate goals as necessary  -      Swallow Criteria for Skilled Therapeutic Interventions Met demonstrates skilled criteria  - demonstrates skilled criteria  - demonstrates skilled criteria  -          Recommendations    Therapy Frequency (Swallow) 5 days per week  - -- 5 days per week  -      Predicted Duration Therapy Intervention (Days) 2 weeks  - 2 weeks  - 2 weeks  -      SLP Diet Recommendation puree;thin liquids;long term alternate methods of nutrition/hydration;other (see comments)  + PEG to ensure nutritional needs met  - NPO;long term alternate methods of nutrition/hydration  - NPO;long term alternate methods of nutrition/hydration  -      Recommended Diagnostics -- reassess via VFSS ([facility])  - reassess via clinical swallow evaluation  10/29  -      Recommended Precautions and Strategies general aspiration precautions;1:1 supervision;assist with feeding;small bites of food and sips of liquid;no straw;check mouth frequently for oral residue/pocketing;reflux precautions  - -- general aspiration precautions  -      Oral Care Recommendations Oral Care BID/PRN;Suction toothbrush  - Oral Care BID/PRN;Suction toothbrush  - Oral Care BID/PRN;Suction toothbrush  -      SLP Rec. for Method of Medication Administration meds via alternate route  - meds via alternate route  - meds via alternate route  -      Monitor for Signs of Aspiration notify SLP if any concerns  - notify SLP if any concerns  - notify SLP if any concerns  -      Anticipated Discharge Disposition (SLP) skilled nursing facility  - skilled nursing facility  - skilled nursing facility  -                User Key  (r) = Recorded By, (t) = Taken By, (c) = Cosigned By      Initials Name Effective Dates     Eliz Cartagena, MS CCC-SLP 10/22/24 -      Kathy Gonzales, MS CCC-SLP 05/12/23 -                     EDUCATION  The patient has been educated in the following areas:   Dysphagia (Swallowing Impairment) Oral Care/Hydration Modified Diet Instruction.        SLP GOALS       Row Name 10/29/24 1420             (LTG) Patient will demonstrate functional swallow for    Diet Texture (Demonstrate functional  swallow) soft to chew (ground) textures  -      Liquid viscosity (Demonstrate functional swallow) thin liquids  -      [place] (Demonstrate functional swallow) with maximum cues (25-49% accuracy)  -      Time Frame (Demonstrate functional swallow) 1 week  -      Progress/Outcomes (Demonstrate functional swallow) goal ongoing  -         (STG) Patient will tolerate trials of    Consistencies Trialed (Tolerate trials) pureed textures;thin liquids  -      Desired Outcome (Tolerate trials) without signs/symptoms of aspiration;with adequate oral prep/transit/clearance  -      [place] (Tolerate trials) with 1:1 assist/ supervision  -      Time Frame (Tolerate trials) 1 week  -      Progress/Outcomes (Tolerate trials) new goal  -         (STG) Patient will tolerate therapeutic trials of    Consistencies Trialed (Tolerate therapeutic trials) pureed textures;soft to chew (ground) textures  -      Desired Outcome (Tolerate therapeutic trials) without signs/symptoms of aspiration;with adequate oral prep/transit/clearance  -      [place] (Tolerate therapeutic trials) independently (over 90% accuracy)  -      Time Frame (Tolerate therapeutic trials) 1 week  -      Progress/Outcomes (Tolerate therapeutic trials) new goal  -         ([facility]) Labial Strengthening Goal 1 (SLP)    Activity (Labial Strengthening Goal 1, SLP) increase labial tone  -      Increase Labial Tone labial resistance exercises  -      [place]/Accuracy (Labial Strengthening Goal 1, SLP) with maximum cues (25-49% accuracy)  -      Time Frame (Labial Strengthening Goal 1, SLP) 1 week  -      Progress/Outcomes (Labial Strengthening Goal 1, SLP) goal ongoing  -         ([facility]) Lingual Strengthening Goal 1 (SLP)    Activity (Lingual Strengthening Goal 1, SLP) increase tongue back strength  -      Increase Tongue Back Strength lingual resistance exercises  -      [place]/Accuracy (Lingual Strengthening  Goal 1, SLP) with maximum cues (25-49% accuracy)  -      Time Frame (Lingual Strengthening Goal 1, SLP) 1 week  -      Progress/Outcomes (Lingual Strengthening Goal 1, SLP) goal ongoing  -         ([facility]) Pharyngeal Strengthening Exercise Goal 1 (SLP)    Activity (Pharyngeal Strengthening Goal 1, SLP) increase timing;increase superior movement of the hyolaryngeal complex;increase anterior movement of the hyolaryngeal complex;increase squeeze/positive pressure generation  -      Increase Timing prepping - 3 second prep or suck swallow or 3-step swallow  -      Increase Superior Movement of the Hyolaryngeal Complex falsetto  -      Increase Anterior Movement of the Hyolaryngeal Complex chin tuck against resistance (CTAR)  -      Increase Squeeze/Positive Pressure Generation hard effortful swallow  -      [place]/Accuracy (Pharyngeal Strengthening Goal 1, SLP) with maximum cues (25-49% accuracy)  -      Time Frame (Pharyngeal Strengthening Goal 1, SLP) 1 week  -      Progress/Outcomes (Pharyngeal Strengthening Goal 1, SLP) goal ongoing  -                User Key  (r) = Recorded By, (t) = Taken By, (c) = Cosigned By      Initials Name Provider Type    Kathy Grossman MS CCC-SLP Speech and Language Pathologist                         Time Calculation:    Time Calculation- SLP       Row Name 10/29/24 1513 10/29/24 1013          Time Calculation- SLP    SLP Start Time 1420  - 0910  -     SLP Received On 10/29/24  - 10/29/24  -        Untimed Charges    14415-CA Eval Oral Pharyng Swallow Minutes -- 40  -     65230-EE Motion Fluoro Eval Swallow Minutes 70  -MH --        Total Minutes    Untimed Charges Total Minutes 70  - 40  -      Total Minutes 70  - 40  -               User Key  (r) = Recorded By, (t) = Taken By, (c) = Cosigned By      Initials Name Provider Type    Kathy Grossman MS CCC-SLP Speech and Language Pathologist                    Therapy Charges for Today        Code Description Service Date Service Provider Modifiers Qty    [id number] HC ST EVAL ORAL PHARYNG SWALLOW 3 10/29/2024 Kathy Gonzales, MS CCC-SLP GN 1    [id number] HC ST MOTION FLUORO EVAL SWALLOW 5 10/29/2024 Kathy Gonzales, MS SARA-SLP GN 1                 MS VIKAS CazaresSLP  10/29/2024

## 2024-10-29 NOTE — PROGRESS NOTES
Psychiatric Medicine Services  PROGRESS NOTE    Patient Name: Omakr Nava  : 1955  MRN: [id number]    Date of Admission: 10/20/2024  Primary Care Physician: Alberto Dye MD    Subjective   Subjective     CC:  Vomiting    HPI:  Patient more alert this morning. He is able to tell me he is watching football. Per nursing plan is for barium swallow study.       Objective   Objective     Vital Signs:   Temp:  [97.1 °F (36.2 °C)-98.2 °F (36.8 °C)] 97.1 °F (36.2 °C)  Heart Rate:  [48-54] 48  Resp:  [16-18] 18  BP: (144-164)/(59-73) 151/59     Physical Exam:  Constitutional: Chronically ill appearing male, no acute distress, awake, alert  HENT: NCAT, mucous membranes moist  Respiratory: Clear to auscultation bilaterally, respiratory effort normal   Cardiovascular: RRR, no murmurs, rubs, or gallops  Gastrointestinal: Positive bowel sounds, soft, nontender, nondistended  Musculoskeletal: No bilateral ankle edema  Psychiatric: flat affect, will follow commands, moves all extremities   Neurologic: Oriented x 1, self. speech clear  Skin: No rashes    Results Reviewed:  LAB RESULTS:      Lab 10/29/24  0842 10/28/24  0922 10/27/24  0850 10/26/24  1206 10/25/24  2219 10/25/24  0943 10/24/24  0915 10/23/24  0943   WBC 9.63 10.38 7.53  --  9.12 10.34 15.34* 14.01*   HEMOGLOBIN 7.9* 8.2* 8.0*  --  8.1* 7.8* 8.6* 8.5*   HEMATOCRIT 25.2* 25.0* 24.1*  --  24.4* 23.8* 26.0* 25.7*   PLATELETS 217 245 209  --  196 178 206 221   NEUTROS ABS 6.93  --   --   --  7.26* 7.95* 12.42* 11.60*   IMMATURE GRANS (ABS) 0.18*  --   --   --  0.07* 0.09* 0.11* 0.07*   LYMPHS ABS 1.43  --   --   --  1.06 1.35 1.50 1.19   MONOS ABS 0.93*  --   --   --  0.59 0.75 1.13* 1.11*   EOS ABS 0.13  --   --   --  0.12 0.18 0.16 0.02   MCV 95.1 93.6 93.8  --  93.1 92.2 93.5 93.1   CRP 1.33*  --   --   --   --   --   --   --    PROCALCITONIN  --  0.12 0.14 0.16  --   --   --   --    LACTATE  --   --   --   --  1.0  --   --   --           Lab 10/29/24  0842 10/28/24  1304 10/28/24  0922 10/27/24  0850 10/25/24  0943 10/24/24  0915   SODIUM 138  --  136 138 140 140   POTASSIUM 5.2 5.4* 5.5* 4.5 4.4 5.0   CHLORIDE 104  --  102 102 102 105   CO2 26.0  --  24.0 27.0 28.0 27.0   ANION GAP 8.0  --  10.0 9.0 10.0 8.0   BUN 25*  --  28* 29* 34* 36*   CREATININE 1.00  --  1.10 1.03 0.94 1.01   EGFR 82.5  --  73.6 79.6 88.9 81.5   GLUCOSE 195*  --  186* 101* 171* 181*   CALCIUM 8.7  --  8.4* 8.7 8.7 9.3         Lab 10/29/24  0842 10/23/24  0943   TOTAL PROTEIN 6.0 5.9*   ALBUMIN 3.3* 3.2*   GLOBULIN 2.7 2.7   ALT (SGPT) 21 22   AST (SGOT) 15 13   BILIRUBIN <0.2 <0.2   ALK PHOS 100 92                       Brief Urine Lab Results  (Last result in the past 365 days)        Color   Clarity   Blood   Leuk Est   Nitrite   Protein   CREAT   Urine HCG        10/20/24 2123 Yellow   Clear   Negative   Negative   Negative   100 mg/dL (2+)                   Cultures:  Blood Culture   Date Value Ref Range Status   10/25/2024 No growth at 2 days  Preliminary   10/25/2024 No growth at 2 days  Preliminary       Microbiology Results Abnormal       Procedure Component Value - Date/Time    MRSA Screen, PCR (Inpatient) - Swab, Nares [[id number]]  (Abnormal) Collected: 10/26/24 1208    Lab Status: Final result Specimen: Swab from Nares Updated: 10/26/24 1408     MRSA PCR Positive    Narrative:      The negative predictive value of this diagnostic test is high and should only be used to consider de-escalating anti-MRSA therapy. A positive result may indicate colonization with MRSA and must be correlated clinically.    Urine Culture - Urine, Straight Cath [[id number]]  (Abnormal)  (Susceptibility) Collected: 10/20/24 2123    Lab Status: Final result Specimen: Urine from Straight Cath Updated: 10/23/24 0902     Urine Culture >100,000 CFU/mL Enterococcus faecalis    Narrative:      Colonization of the urinary tract without infection is common. Treatment is discouraged unless  the patient is symptomatic, pregnant, or undergoing an invasive urologic procedure.    Susceptibility        Enterococcus faecalis      KB      Ampicillin Susceptible      Levofloxacin Susceptible      Nitrofurantoin Susceptible      Vancomycin Susceptible                           Respiratory Panel PCR w/COVID-19(SARS-CoV-2) GIANNI/SIENNA/ANNA/PAD/COR/ASHIA In-House, NP Swab in UTM/VTM, 2 HR TAT - Swab, Nasopharynx [[id number]]  (Abnormal) Collected: 10/22/24 1112    Lab Status: Final result Specimen: Swab from Nasopharynx Updated: 10/22/24 1228     ADENOVIRUS, PCR Not Detected     Coronavirus 229E Not Detected     Coronavirus HKU1 Not Detected     Coronavirus NL63 Not Detected     Coronavirus OC43 Not Detected     COVID19 Not Detected     Human Metapneumovirus Not Detected     Human Rhinovirus/Enterovirus Detected     Influenza A PCR Not Detected     Influenza B PCR Not Detected     Parainfluenza Virus 1 Not Detected     Parainfluenza Virus 2 Not Detected     Parainfluenza Virus 3 Not Detected     Parainfluenza Virus 4 Not Detected     RSV, PCR Not Detected     Bordetella pertussis pcr Not Detected     Bordetella parapertussis PCR Not Detected     Chlamydophila pneumoniae PCR Not Detected     Mycoplasma pneumo by PCR Not Detected    Narrative:      In the setting of a positive respiratory panel with a viral infection PLUS a negative procalcitonin without other underlying concern for bacterial infection, consider observing off antibiotics or discontinuation of antibiotics and continue supportive care. If the respiratory panel is positive for atypical bacterial infection (Bordetella pertussis, Chlamydophila pneumoniae, or Mycoplasma pneumoniae), consider antibiotic de-escalation to target atypical bacterial infection.            FL Video Swallow With Speech Single Contrast    Result Date: 10/29/2024  FL VIDEO SWALLOW W SPEECH SINGLE-CONTRAST Date of Exam: 10/29/2024 1:54 PM EDT Indication: dysphagia.   Comparison: None  available. Technique:   The speech pathologist administered food and/or liquid mixed with barium to the patient with cine/video imaging.  Imaging assistance was provided to the speech pathologist and an image was saved. Fluoroscopic Time: 1 minute and 54 seconds Number of Images: 6 associated fluoroscopic loops were saved Findings: Penetration of barium was seen during fluoroscopic guided modified barium swallowing series. Please see speech therapy report for full details and recommendations.     Impression: Impression: Fluoroscopy provided for a modified barium swallow. Penetration of barium was seen during swallowing evaluation. Please see speech therapy report for full details and recommendations. Report dictated by: Maggi Silverman PA-c  I have personally reviewed this case and agree with the findings above: Electronically Signed: Anjum Prado MD  10/29/2024 3:00 PM EDT  Workstation ID: [id number]     Results for orders placed during the hospital encounter of 01/15/24    Adult Transthoracic Echo Complete With Contrast if Necessary Per Protocol    Interpretation Summary    Left ventricular ejection fraction appears to be 56 - 60%.    Left ventricular wall thickness is consistent with hypertrophy.    Estimated right ventricular systolic pressure from tricuspid regurgitation is mildly elevated (35-45 mmHg).    There is a small (1-2cm) pericardial effusion.    No evidence for pericardial tamponade      Current medications:  Scheduled Meds:albuterol, 2.5 mg, Nebulization, Q6H - RT  amLODIPine, 10 mg, Per PEG Tube, Q24H  ARIPiprazole, 5 mg, Per G Tube, Daily  brimonidine, 1 drop, Both Eyes, TID  carvedilol, 12.5 mg, Per PEG Tube, Q12H  cefuroxime, 500 mg, Oral, Q12H  cloNIDine, 0.2 mg, Per PEG Tube, Q8H  docusate sodium, 100 mg, Per G Tube, Daily  donepezil, 5 mg, Oral, Nightly  doxycycline, 100 mg, Oral, Q12H  enoxaparin, 40 mg, Subcutaneous, Daily  FLUoxetine, 20 mg, Per PEG Tube, Daily  hydrALAZINE, 100 mg, Per PEG  Tube, Q8H  insulin lispro, 2-7 Units, Subcutaneous, Q6H  lansoprazole, 30 mg, Per PEG Tube, Q AM  palliative care oral rinse, 5 mL, Mouth/Throat, 4x Daily  polyethylene glycol, 17 g, Per G Tube, Daily  QUEtiapine, 25 mg, Per PEG Tube, Nightly  sodium chloride, 10 mL, Intravenous, Q12H  terazosin, 5 mg, Per G Tube, Q12H  timolol, 1 drop, Both Eyes, BID  Valproic Acid, 150 mg, Nasogastric, Q8H      Continuous Infusions:Pharmacy to dose vancomycin,       PRN Meds:.  acetaminophen **OR** acetaminophen **OR** acetaminophen    senna-docusate sodium **AND** polyethylene glycol **AND** [DISCONTINUED] bisacodyl **AND** bisacodyl    dextrose    glucagon (human recombinant)    hydrOXYzine    Magnesium Standard Dose Replacement - Follow Nurse / BPA Driven Protocol    nitroglycerin    [DISCONTINUED] ondansetron ODT **OR** ondansetron    Pharmacy to dose vancomycin    Phosphorus Replacement - Follow Nurse / BPA Driven Protocol    Potassium Replacement - Follow Nurse / BPA Driven Protocol    sodium chloride    sodium chloride    Assessment & Plan   Assessment & Plan     Active Hospital Problems    Diagnosis  POA    **UTI (urinary tract infection) [N39.0]  Yes    Hypothermia [T68.XXXA]  Yes    Acute encephalopathy [G93.40]  Yes    Vomiting [R11.10]  Yes    Severe vascular dementia without behavioral disturbance, psychotic disturbance, mood disturbance, or anxiety [F01.C0]  Yes    Protein calorie malnutrition [E46]  Yes    S/P transmetatarsal amputation of foot, left [Z89.432]  Not Applicable    Anemia of chronic disease [D63.8]  Yes    Neurocognitive disorder [R41.9]  Yes    Type 2 diabetes mellitus [E11.9]  Yes    Essential hypertension [I10]  Yes    Hypertensive heart and chronic kidney disease 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 failure, with stage 1 through stage 4 chronic kidney disease, or unspecified chronic kidney disease [I13.10]  Yes    Long term (current) use of insulin [Z79.4]  Not Applicable      Resolved Hospital Problems   No resolved  problems to display.        Brief Hospital Course to date:  Omkar Nava is a 69 y.o. male with a PMHx of hypertension, diabetes, chronic kidney disease, vascular dementia, dysphagia, and chronic iron def anemia admitted with increased vomiting, with concern for possible underlying infection. Found to be positive for Rhinovirus and UTI.  The patient initially on amoxicillin for UTI.  Overnight on 10/25 through 10/26 patient became hypothermic with a T minimum of 92 degrees, requiring external warming.  Chest x-ray revealed bibasilar opacities. Antibiotics broadened on 10/26.      Postviral pneumonia versus HAP  - On 10/25, patient became hypothermic requiring external warming (Tmin 92)  - Chest x-ray revealing bibasilar opacities, though decreased from previous chest xrays  - MRSA nares positive  - No leukocytosis, procal 0.05 -> 0.16, lactate 1.0  PLAN  - Patient much more alert/conversational after starting abx yesterday  - Continue Vanc and Zosyn for now. Given complex hospitalization, appreciate ID recommendations -> 10/29 transition to doxycycline and Ceftin  - bld cx 10/26: NGTD     Acute encephalopathy likely secondary to infection  Initial UTI/viral pneumonia  - UA with 4+ bacteria and urine cultures growing Enterococcus faecalis.    - Respiratory panel positive for rhinovirus  - Abx per above      Hyperkalemia, resolved  - s/p lokelma xs 2      JAYY on CKD-stage 2, resolved   - Baseline creatinine near 1.1-1.2  - Cr near baseline  - Status post IVF     Chronic aspiration  -Repeat barium swallow 10/29: Purée, thin liquids with long-term return of methods. Continue with PEG    Chronic anemia  - At baseline, cont to monitor.     HFpEF  HTN  - Echo with EF of 56 to 60%  - Continue carvedilol, clonidine, terazosin, hydralazine  - Hold diuretics     T2DM  - A1c 5.6 last hospitalization  - Tolerating tube feeds, per records, was on Levemir 6 U BID on last admission. At this time, has not needed much insulin. On  discharge, will d/c on Metformin        Dementia with agitation  Questionable seizure disorder  - Seroquel, Depakote, Abilify, prozac  -AM Depakote level pending     Expected Discharge Location and Transportation: long term care  Expected Discharge 10/28/2024  Expected Discharge Date: 10/26/2024; Expected Discharge Time:      VTE Prophylaxis:  Pharmacologic VTE prophylaxis orders are present.         AM-PAC 6 Clicks Score (PT): 7 (10/29/24 1540)    CODE STATUS:   Code Status and Medical Interventions: CPR (Attempt to Resuscitate); Full Support   Ordered at: 10/21/24 0146     Level Of Support Discussed With:    Health Care Surrogate     Code Status (Patient has no pulse and is not breathing):    CPR (Attempt to Resuscitate)     Medical Interventions (Patient has pulse or is breathing):    Full Support       Farhana Lion MD  10/29/24

## 2024-10-29 NOTE — THERAPY TREATMENT NOTE
Patient Name: Omkar Nava  : 1955    MRN: [id number]                              Today's Date: 10/29/2024       Admit Date: 10/20/2024    Visit Dx:     ICD-10-CM ICD-9-CM   1. Acute cystitis without hematuria  N30.00 595.0   2. Pneumonia due to infectious organism, unspecified laterality, unspecified part of lung  J18.9 486   3. 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, without long-term current use of insulin    Acute osteomyelitis of left foot    Type 2 diabetes mellitus    Essential hypertension    Psychotic disorder    Neurocognitive disorder    S/P transmetatarsal amputation of foot, left    Abscess of left foot    Anemia of chronic disease    Aspiration pneumonia    Cervical spine pain    Chronic kidney disease    Closed head injury without loss of consciousness    Dementia    Dental cavities    Diarrhea of presumed infectious origin    Displaced fracture of proximal phalanx of left great toe, initial encounter for open fracture    Dysphagia    Erectile dysfunction    Generalized muscle weakness    Hallucinations    Hypertensive heart and chronic kidney disease 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 failure, with stage 1 through stage 4 chronic kidney disease, or unspecified chronic kidney disease    Insomnia due to medical condition    Iron deficiency anemia    Laceration without foreign body, left foot, subsequent encounter    Long term (current) use of insulin    Personal history of Methicillin resistant Staphylococcus aureus infection    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    Protein calorie malnutrition    Simple chronic bronchitis    Tobacco use    Type 2 diabetes mellitus with diabetic neuropathy, unspecified    Type 2 diabetes mellitus with diabetic chronic kidney disease    Acute type 1 respiratory failure    Severe vascular dementia without  behavioral disturbance, psychotic disturbance, mood disturbance, or anxiety    UTI (urinary tract infection)    Vomiting    Acute encephalopathy    Hypothermia     Past Medical History:   Diagnosis Date    Anemia     Dementia     Diabetes mellitus     Dysphagia     GERD (gastroesophageal reflux disease)     History of alcohol abuse     History of cocaine use     History of marijuana use     Hypertension     Osteomyelitis     Poor historian     records obtained from nursing home records & his family    Visual impairment      Past Surgical History:   Procedure Laterality Date    AMPUTATION FOOT Left 10/18/2022    Procedure: PARTIAL FIRST RAY AMPUTATION LEFT;  Surgeon: Yeison Petty MD;  Location:  [facility];  Service: Orthopedics;  Laterality: Left;    AMPUTATION FOOT Left 12/5/2022    Procedure: Transmetatarsal of Left Foot;  Surgeon: Yeison Petty MD;  Location:  [facility];  Service: Orthopedics;  Laterality: Left;    ENDOSCOPY N/A 3/14/2024    Procedure: ESOPHAGOGASTRODUODENOSCOPY WITH JEJUNAL TUBE INSERTION AT BEDSIDE;  Surgeon: Brunner, Mark I, MD;  Location:  [facility];  Service: Gastroenterology;  Laterality: N/A;    ENDOSCOPY W/ PEG TUBE PLACEMENT N/A 4/1/2024    Procedure: ESOPHAGOGASTRODUODENOSCOPY WITH PERCUTANEOUS ENDOSCOPIC GASTROSTOMY TUBE INSERTION;  Surgeon: Brunner, Mark I, MD;  Location:  [facility];  Service: Gastroenterology;  Laterality: N/A;  EGD with PEG placement.  Secured at 3.5 cm    EYE SURGERY        General Information       Row Name 10/29/24 1526          Physical Therapy Time and Intention    Document Type therapy note (daily note)  -AC     Mode of Treatment physical therapy  -AC       Row Name 10/29/24 1526          General Information    Patient Profile Reviewed yes  -AC     Existing Precautions/Restrictions fall;oxygen therapy device and L/min  DROPLET PRECAUTIONS (RHINOVIRUS) H/O L TRANSMETATARSAL AMPUTATION, HAS PEG, BLIND (SEES SHADOWS)  -AC     Barriers to Rehab visual  deficit;cognitive status;medically complex;previous functional deficit  -       Row Name 10/29/24 1526          Cognition    Orientation Status (Cognition) oriented to;person;disoriented to;place;situation;time  -       Row Name 10/29/24 1526          Safety Issues/Impairments Affecting Functional Mobility    Safety Issues Affecting Function (Mobility) insight into deficits/self-awareness;awareness of need for assistance;safety precaution awareness;safety precautions follow-through/compliance  -     Impairments Affecting Function (Mobility) balance;cognition;endurance/activity tolerance;strength;visual/perceptual  -     Cognitive Impairments, Mobility Safety/Performance insight into deficits/self-awareness;safety precaution awareness;safety precaution follow-through;sequencing abilities;attention;awareness, need for assistance  -               User Key  (r) = Recorded By, (t) = Taken By, (c) = Cosigned By      Initials Name Provider Type     Marry Jarrell, PT Physical Therapist                   Mobility       Row Name 10/29/24 1528          Bed Mobility    Bed Mobility supine-sit  -     Supine-Sit [place] (Bed Mobility) moderate assist (50% patient effort);2 person assist  -     Assistive Device (Bed Mobility) head of bed elevated;repositioning sheet  -     Comment, (Bed Mobility) Pt was able to initiate supine to sit by advancing LE's towards EOB however required modAx2 for trunk. Pt was able to maintain static seated balance w/ minAx1 and cues for hand placement  -       Row Name 10/29/24 1528          Bed-Chair Transfer    Bed-Chair [place] (Transfers) maximum assist (25% patient effort);2 person assist;verbal cues;nonverbal cues (demo/gesture)  -     Assistive Device (Bed-Chair Transfers) other (see comments)  BUE support  -     Comment, (Bed-Chair Transfer) Pt completed SPT transfer w/ B knees blocked and maxAx2  -       Row Name 10/29/24 1528          Sit-Stand Transfer     Sit-Stand [place] (Transfers) maximum assist (25% patient effort);2 person assist  -AC     Assistive Device (Sit-Stand Transfers) other (see comments)  BUE support  -     Comment, (Sit-Stand Transfer) Pt reached full upright standing w/ BUE support and B knee blocking  -       Row Name 10/29/24 1528          Gait/Stairs (Locomotion)    [place] Level (Gait) not tested;other (see comments)  -               User Key  (r) = Recorded By, (t) = Taken By, (c) = Cosigned By      Initials Name Provider Type     Marry Jarrell PT Physical Therapist                   Obj/Interventions       Row Name 10/29/24 1534          Balance    Balance Assessment sitting static balance;sitting dynamic balance;standing static balance;standing dynamic balance  -     Static Sitting Balance minimal assist;1-person assist  -AC     Dynamic Sitting Balance moderate assist;2-person assist  -AC     Position, Sitting Balance supported;sitting edge of bed  -AC     Static Standing Balance maximum assist;2-person assist  -AC     Dynamic Standing Balance maximum assist;2-person assist  -AC     Position/Device Used, Standing Balance supported;other (see comments)  BUE support  -     Balance Interventions sitting;standing;sit to stand;supported;static;dynamic  -               User Key  (r) = Recorded By, (t) = Taken By, (c) = Cosigned By      Initials Name Provider Type     Marry Jarrell PT Physical Therapist                   Goals/Plan    No documentation.                  Clinical Impression       Row Name 10/29/24 1534          Pain    Pretreatment Pain Rating 0/10 - no pain  -     Posttreatment Pain Rating 0/10 - no pain  -       Row Name 10/29/24 1537          Plan of Care Review    Plan of Care Reviewed With patient  -AC     Progress improving  -     Outcome Evaluation Pt was able to demonstrate increased ability to follow commands. In addition pt was able to complete SPT transfer to bedside chair however requried  maxAx2, BUE support, and B knee blocking due to weakness. Pt would benefit from continued skilled therapy while hospitalized to maximize functional independence. D/c rec remains SNF for best outcome.  -AC       Row Name 10/29/24 1536          Therapy Assessment/Plan (PT)    Rehab Potential (PT) fair  -AC     Criteria for Skilled Interventions Met (PT) meets criteria;skilled treatment is necessary  -AC     Therapy Frequency (PT) 3 times/wk  decreasing frequency to 3x/week due to limited participation  -AC     Predicted Duration of Therapy Intervention (PT) 2 weeks  -AC       Row Name 10/29/24 1536          Vital Signs    Pretreatment Heart Rate (beats/min) 44  -AC     Posttreatment Heart Rate (beats/min) 49  -AC     Pre SpO2 (%) 99  -AC     O2 Delivery Pre Treatment room air  -AC     O2 Delivery Intra Treatment room air  -AC     Post SpO2 (%) 99  -AC     O2 Delivery Post Treatment room air  -AC     Pre Patient Position Supine  -AC     Intra Patient Position Standing  -AC     Post Patient Position Sitting  -AC       Row Name 10/29/24 1536          Positioning and Restraints    Pre-Treatment Position in bed  -AC     Post Treatment Position chair  -AC     In Chair notified nsg;reclined;sitting;call light within reach;encouraged to call for assist;exit alarm on;on mechanical lift sling;waffle cushion;legs elevated  -AC               User Key  (r) = Recorded By, (t) = Taken By, (c) = Cosigned By      Initials Name Provider Type    Marry Holder, PT Physical Therapist                   Outcome Measures       Row Name 10/29/24 5388          How much help from another person do you currently need...    Turning from your back to your side while in flat bed without using bedrails? 2  -AC     Moving from lying on back to sitting on the side of a flat bed without bedrails? 1  -AC     Moving to and from a bed to a chair (including a wheelchair)? 1  -AC     Standing up from a chair using your arms (e.g., wheelchair, bedside  chair)? 1  -AC     Climbing 3-5 steps with a railing? 1  -AC     To walk in hospital room? 1  -AC     AM-PAC 6 Clicks Score (PT) 7  -AC     Highest Level of Mobility Goal 2 --> Bed activities/dependent transfer  -AC       Row Name 10/29/24 1540          Functional Assessment    Outcome Measure Options AM-PAC 6 Clicks Basic Mobility (PT)  -               User Key  (r) = Recorded By, (t) = Taken By, (c) = Cosigned By      Initials Name Provider Type    AC Marry Jarrell, PT Physical Therapist                                 Physical Therapy Education       Title: PT OT SLP Therapies (In Progress)       Topic: Physical Therapy (In Progress)       Point: Mobility training (In Progress)       Learning Progress Summary            Patient Acceptance, E, NR by  at 10/29/2024 1541    Acceptance, E, VU,NR by  at 10/23/2024 1033    Comment: BENEFITS OF OOB ACTIVITY, SAFETY WITH MOBILITY, PROGRESSION OF POC, D/C PLANNING,                      Point: Home exercise program (In Progress)       Learning Progress Summary            Patient Acceptance, E, NR by  at 10/29/2024 1541    Acceptance, E, VU,NR by CD at 10/23/2024 1033    Comment: BENEFITS OF OOB ACTIVITY, SAFETY WITH MOBILITY, PROGRESSION OF POC, D/C PLANNING,                      Point: Body mechanics (In Progress)       Learning Progress Summary            Patient Acceptance, E, NR by  at 10/29/2024 1541    Acceptance, E, VU,NR by CD at 10/23/2024 1033    Comment: BENEFITS OF OOB ACTIVITY, SAFETY WITH MOBILITY, PROGRESSION OF POC, D/C PLANNING,                      Point: Precautions (In Progress)       Learning Progress Summary            Patient Acceptance, E, NR by  at 10/29/2024 1541    Acceptance, E, VU,NR by CD at 10/23/2024 1033    Comment: BENEFITS OF OOB ACTIVITY, SAFETY WITH MOBILITY, PROGRESSION OF POC, D/C PLANNING,                                      User Key       Initials Effective Dates Name Provider Type Discipline    CD 02/03/23 -  Lor  Rae HERNANDEZ PT Physical Therapist PT    AC 07/11/24 -  Marry Jarrell PT Physical Therapist PT                  PT Recommendation and Plan     Progress: improving  Outcome Evaluation: Pt was able to demonstrate increased ability to follow commands. In addition pt was able to complete SPT transfer to bedside chair however requried maxAx2, BUE support, and B knee blocking due to weakness. Pt would benefit from continued skilled therapy while hospitalized to maximize functional independence. D/c rec remains SNF for best outcome.     Time Calculation:         PT Charges       Row Name 10/29/24 1541             Time Calculation    Start Time 1506  -AC      PT Received On 10/29/24  -AC      PT Goal Re-Cert Due Date 11/02/24  -AC         Time Calculation- PT    Total Timed Code Minutes- PT 26 minute(s)  -AC         Timed Charges    46422 - PT Therapeutic Activity Minutes 26  -AC         Total Minutes    Timed Charges Total Minutes 26  -AC       Total Minutes 26  -AC                User Key  (r) = Recorded By, (t) = Taken By, (c) = Cosigned By      Initials Name Provider Type    AC Marry Jarrell, [place] Physical Therapist                  Therapy Charges for Today       Code Description Service Date Service Provider Modifiers Qty    [id number]  PT THERAPEUTIC ACT EA 15 MIN 10/29/2024 Marry Jarrell, PT GP 2            PT G-Codes  Outcome Measure Options: AM-PAC 6 Clicks Basic Mobility (PT)  AM-PAC 6 Clicks Score (PT): 7  PT Discharge Summary  Anticipated Discharge Disposition (PT): skilled nursing facility    Marry Jarrell PT  10/29/2024

## 2024-10-29 NOTE — PLAN OF CARE
Goal Outcome Evaluation:              Outcome Evaluation: Patient alert to self requires reorietation all during the shift. His diet was changed to pureed with thin liquids with no straws, this nurse attemepted to feed him and he stated that he did not like that only took a few bites out of each portion. He has been up in the chair this shift with assist from staff. Has alarms on and in place with call light within reach.

## 2024-10-30 PROBLEM — R11.10 VOMITING: Status: RESOLVED | Noted: 2024-10-21 | Resolved: 2024-10-30

## 2024-10-30 PROBLEM — Z79.4 LONG TERM (CURRENT) USE OF INSULIN: Status: RESOLVED | Noted: 2022-10-05 | Resolved: 2024-10-30

## 2024-10-30 PROBLEM — N39.0 UTI (URINARY TRACT INFECTION): Status: RESOLVED | Noted: 2024-10-20 | Resolved: 2024-10-30

## 2024-10-30 PROBLEM — G93.40 ACUTE ENCEPHALOPATHY: Status: RESOLVED | Noted: 2024-10-22 | Resolved: 2024-10-30

## 2024-10-30 PROBLEM — T68.XXXA HYPOTHERMIA: Status: RESOLVED | Noted: 2024-10-25 | Resolved: 2024-10-30

## 2024-10-30 LAB
ANION GAP SERPL CALCULATED.3IONS-SCNC: 9 MMOL/L (ref 5–15)
BUN SERPL-MCNC: 23 MG/DL (ref 8–23)
BUN/CREAT SERPL: 31.1 (ref 7–25)
CALCIUM SPEC-SCNC: 9 MG/DL (ref 8.6–10.5)
CHLORIDE SERPL-SCNC: 103 MMOL/L (ref 98–107)
CO2 SERPL-SCNC: 23 MMOL/L (ref 22–29)
CREAT SERPL-MCNC: 0.74 MG/DL (ref 0.76–1.27)
EGFRCR SERPLBLD CKD-EPI 2021: 99.3 ML/MIN/1.73
GLUCOSE BLDC GLUCOMTR-MCNC: 124 MG/DL (ref 70–130)
GLUCOSE BLDC GLUCOMTR-MCNC: 194 MG/DL (ref 70–130)
GLUCOSE BLDC GLUCOMTR-MCNC: 220 MG/DL (ref 70–130)
GLUCOSE BLDC GLUCOMTR-MCNC: 271 MG/DL (ref 70–130)
GLUCOSE SERPL-MCNC: 236 MG/DL (ref 65–99)
HCT VFR BLD AUTO: 23.9 % (ref 37.5–51)
HCT VFR BLD AUTO: 24.5 % (ref 37.5–51)
HGB BLD-MCNC: 7.5 G/DL (ref 13–17.7)
HGB BLD-MCNC: 7.7 G/DL (ref 13–17.7)
POTASSIUM SERPL-SCNC: 5.2 MMOL/L (ref 3.5–5.2)
SODIUM SERPL-SCNC: 135 MMOL/L (ref 136–145)

## 2024-10-30 PROCEDURE — 92526 ORAL FUNCTION THERAPY: CPT

## 2024-10-30 PROCEDURE — 80048 BASIC METABOLIC PNL TOTAL CA: CPT | Performed by: FAMILY MEDICINE

## 2024-10-30 PROCEDURE — 25010000002 ENOXAPARIN PER 10 MG: Performed by: PEDIATRICS

## 2024-10-30 PROCEDURE — 99239 HOSP IP/OBS DSCHRG MGMT >30: CPT | Performed by: NURSE PRACTITIONER

## 2024-10-30 PROCEDURE — 99232 SBSQ HOSP IP/OBS MODERATE 35: CPT

## 2024-10-30 PROCEDURE — 85018 HEMOGLOBIN: CPT | Performed by: FAMILY MEDICINE

## 2024-10-30 PROCEDURE — 85014 HEMATOCRIT: CPT | Performed by: FAMILY MEDICINE

## 2024-10-30 PROCEDURE — 82948 REAGENT STRIP/BLOOD GLUCOSE: CPT

## 2024-10-30 PROCEDURE — 94799 UNLISTED PULMONARY SVC/PX: CPT

## 2024-10-30 PROCEDURE — 94664 DEMO&/EVAL PT USE INHALER: CPT

## 2024-10-30 PROCEDURE — 63710000001 INSULIN LISPRO (HUMAN) PER 5 UNITS: Performed by: STUDENT IN AN ORGANIZED HEALTH CARE EDUCATION/TRAINING PROGRAM

## 2024-10-30 RX ORDER — FLUOXETINE 20 MG/5ML
20 SOLUTION ORAL DAILY
Status: ON HOLD
Start: 2024-10-30

## 2024-10-30 RX ORDER — DOXYCYCLINE 100 MG/1
100 CAPSULE ORAL EVERY 12 HOURS SCHEDULED
Qty: 8 CAPSULE | Refills: 0 | Status: SHIPPED | OUTPATIENT
Start: 2024-10-30 | End: 2024-11-03

## 2024-10-30 RX ORDER — HYDROXYZINE HYDROCHLORIDE 25 MG/1
25 TABLET, FILM COATED ORAL 3 TIMES DAILY PRN
Status: ON HOLD
Start: 2024-10-30

## 2024-10-30 RX ORDER — CEFUROXIME AXETIL 500 MG/1
500 TABLET ORAL EVERY 12 HOURS SCHEDULED
Qty: 8 TABLET | Refills: 0 | Status: SHIPPED | OUTPATIENT
Start: 2024-10-30 | End: 2024-11-03

## 2024-10-30 RX ORDER — POLYETHYLENE GLYCOL 3350 17 G/17G
17 POWDER, FOR SOLUTION ORAL DAILY
Qty: 30 EACH | Refills: 0 | Status: ON HOLD | OUTPATIENT
Start: 2024-10-31

## 2024-10-30 RX ADMIN — HYDRALAZINE HYDROCHLORIDE 100 MG: 50 TABLET ORAL at 21:40

## 2024-10-30 RX ADMIN — DONEPEZIL HYDROCHLORIDE 5 MG: 10 TABLET, FILM COATED ORAL at 21:39

## 2024-10-30 RX ADMIN — AMLODIPINE BESYLATE 10 MG: 10 TABLET ORAL at 08:31

## 2024-10-30 RX ADMIN — HYDRALAZINE HYDROCHLORIDE 100 MG: 50 TABLET ORAL at 05:12

## 2024-10-30 RX ADMIN — CLONIDINE HYDROCHLORIDE 0.2 MG: 0.1 TABLET ORAL at 15:32

## 2024-10-30 RX ADMIN — VALPROIC ACID 150 MG: 250 SOLUTION ORAL at 05:11

## 2024-10-30 RX ADMIN — INSULIN LISPRO 4 UNITS: 100 INJECTION, SOLUTION INTRAVENOUS; SUBCUTANEOUS at 12:21

## 2024-10-30 RX ADMIN — DOXYCYCLINE 100 MG: 100 CAPSULE ORAL at 21:39

## 2024-10-30 RX ADMIN — TERAZOSIN HYDROCHLORIDE 5 MG: 5 CAPSULE ORAL at 08:31

## 2024-10-30 RX ADMIN — HYDRALAZINE HYDROCHLORIDE 100 MG: 50 TABLET ORAL at 15:32

## 2024-10-30 RX ADMIN — ENOXAPARIN SODIUM 40 MG: 100 INJECTION SUBCUTANEOUS at 08:34

## 2024-10-30 RX ADMIN — VALPROIC ACID 150 MG: 250 SOLUTION ORAL at 15:32

## 2024-10-30 RX ADMIN — BRIMONIDINE TARTRATE 1 DROP: 2 SOLUTION/ DROPS OPHTHALMIC at 15:33

## 2024-10-30 RX ADMIN — CEFUROXIME AXETIL 500 MG: 250 TABLET, FILM COATED ORAL at 21:38

## 2024-10-30 RX ADMIN — QUETIAPINE FUMARATE 25 MG: 25 TABLET ORAL at 21:39

## 2024-10-30 RX ADMIN — ALBUTEROL SULFATE 2.5 MG: 2.5 SOLUTION RESPIRATORY (INHALATION) at 19:43

## 2024-10-30 RX ADMIN — CARVEDILOL 12.5 MG: 6.25 TABLET, FILM COATED ORAL at 08:31

## 2024-10-30 RX ADMIN — CEFUROXIME AXETIL 500 MG: 250 TABLET, FILM COATED ORAL at 08:31

## 2024-10-30 RX ADMIN — VALPROIC ACID 150 MG: 250 SOLUTION ORAL at 21:38

## 2024-10-30 RX ADMIN — POLYETHYLENE GLYCOL 3350 17 G: 17 POWDER, FOR SOLUTION ORAL at 08:30

## 2024-10-30 RX ADMIN — DOXYCYCLINE 100 MG: 100 CAPSULE ORAL at 08:31

## 2024-10-30 RX ADMIN — INSULIN LISPRO 2 UNITS: 100 INJECTION, SOLUTION INTRAVENOUS; SUBCUTANEOUS at 18:41

## 2024-10-30 RX ADMIN — LANSOPRAZOLE 30 MG: 15 TABLET, ORALLY DISINTEGRATING ORAL at 05:12

## 2024-10-30 RX ADMIN — TERAZOSIN HYDROCHLORIDE 5 MG: 5 CAPSULE ORAL at 21:38

## 2024-10-30 RX ADMIN — FLUOXETINE HYDROCHLORIDE 20 MG: 20 SOLUTION ORAL at 08:31

## 2024-10-30 RX ADMIN — BRIMONIDINE TARTRATE 1 DROP: 2 SOLUTION/ DROPS OPHTHALMIC at 21:39

## 2024-10-30 RX ADMIN — BRIMONIDINE TARTRATE 1 DROP: 2 SOLUTION/ DROPS OPHTHALMIC at 08:33

## 2024-10-30 RX ADMIN — MINERAL OIL 5 ML: 1000 LIQUID ORAL at 08:34

## 2024-10-30 RX ADMIN — CLONIDINE HYDROCHLORIDE 0.2 MG: 0.1 TABLET ORAL at 21:40

## 2024-10-30 RX ADMIN — ALBUTEROL SULFATE 2.5 MG: 2.5 SOLUTION RESPIRATORY (INHALATION) at 06:50

## 2024-10-30 RX ADMIN — INSULIN LISPRO 3 UNITS: 100 INJECTION, SOLUTION INTRAVENOUS; SUBCUTANEOUS at 06:07

## 2024-10-30 RX ADMIN — ARIPIPRAZOLE 5 MG: 5 TABLET ORAL at 08:31

## 2024-10-30 RX ADMIN — ALBUTEROL SULFATE 2.5 MG: 2.5 SOLUTION RESPIRATORY (INHALATION) at 13:01

## 2024-10-30 RX ADMIN — CARVEDILOL 12.5 MG: 6.25 TABLET, FILM COATED ORAL at 21:38

## 2024-10-30 RX ADMIN — TIMOLOL MALEATE 1 DROP: 5 SOLUTION/ DROPS OPHTHALMIC at 08:33

## 2024-10-30 RX ADMIN — Medication 10 ML: at 08:34

## 2024-10-30 NOTE — PROGRESS NOTES
[facility] Medicine Services  PROGRESS NOTE    Patient Name: Omkar Nava  : 1955  MRN: [id number]    Date of Admission: 10/20/2024  Primary Care Physician: Alberto Dye MD    Subjective   Subjective     CC:  Vomiting    HPI:  Patient continues to be alert.  Labs were delayed which ultimately pushed discharge to tomorrow.  No active signs of bleeding.      Objective   Objective     Vital Signs:   Temp:  [97.4 °F (36.3 °C)-97.8 °F (36.6 °C)] 97.8 °F (36.6 °C)  Heart Rate:  [42-51] 50  Resp:  [16-18] 16  BP: (135-162)/() 149/123     Physical Exam:  Constitutional: Chronically ill appearing male, no acute distress, awake, alert  HENT: NCAT, mucous membranes moist  Respiratory: Clear to auscultation bilaterally, respiratory effort normal   Cardiovascular: RRR, no murmurs, rubs, or gallops  Gastrointestinal: Positive bowel sounds, soft, nontender, nondistended  Musculoskeletal: No bilateral ankle edema  Psychiatric: flat affect, will follow commands, moves all extremities   Neurologic: Oriented x 1, self. speech clear  Skin: No rashes    Results Reviewed:  LAB RESULTS:      Lab 10/30/24  1630 10/30/24  1050 10/29/24  0842 10/28/24  0922 10/27/24  0850 10/26/24  1206 10/25/24  2219 10/25/24  0943 10/24/24  0915   WBC  --   --  9.63 10.38 7.53  --  9.12 10.34 15.34*   HEMOGLOBIN 7.5* 7.7* 7.9* 8.2* 8.0*  --  8.1* 7.8* 8.6*   HEMATOCRIT 24.5* 23.9* 25.2* 25.0* 24.1*  --  24.4* 23.8* 26.0*   PLATELETS  --   --  217 245 209  --  196 178 206   NEUTROS ABS  --   --  6.93  --   --   --  7.26* 7.95* 12.42*   IMMATURE GRANS (ABS)  --   --  0.18*  --   --   --  0.07* 0.09* 0.11*   LYMPHS ABS  --   --  1.43  --   --   --  1.06 1.35 1.50   MONOS ABS  --   --  0.93*  --   --   --  0.59 0.75 1.13*   EOS ABS  --   --  0.13  --   --   --  0.12 0.18 0.16   MCV  --   --  95.1 93.6 93.8  --  93.1 92.2 93.5   CRP  --   --  1.33*  --   --   --   --   --   --    PROCALCITONIN  --   --   --  0.12  0.14 0.16  --   --   --    LACTATE  --   --   --   --   --   --  1.0  --   --          Lab 10/30/24  1050 10/29/24  0842 10/28/24  1304 10/28/24  0922 10/27/24  0850 10/25/24  0943   SODIUM 135* 138  --  136 138 140   POTASSIUM 5.2 5.2 5.4* 5.5* 4.5 4.4   CHLORIDE 103 104  --  102 102 102   CO2 23.0 26.0  --  24.0 27.0 28.0   ANION GAP 9.0 8.0  --  10.0 9.0 10.0   BUN 23 25*  --  28* 29* 34*   CREATININE 0.74* 1.00  --  1.10 1.03 0.94   EGFR 99.3 82.5  --  73.6 79.6 88.9   GLUCOSE 236* 195*  --  186* 101* 171*   CALCIUM 9.0 8.7  --  8.4* 8.7 8.7         Lab 10/29/24  0842   TOTAL PROTEIN 6.0   ALBUMIN 3.3*   GLOBULIN 2.7   ALT (SGPT) 21   AST (SGOT) 15   BILIRUBIN <0.2   ALK PHOS 100                       Brief Urine Lab Results  (Last result in the past 365 days)        Color   Clarity   Blood   Leuk Est   Nitrite   Protein   CREAT   Urine HCG        10/20/24 2123 Yellow   Clear   Negative   Negative   Negative   100 mg/dL (2+)                   Cultures:  Blood Culture   Date Value Ref Range Status   10/25/2024 No growth at 2 days  Preliminary   10/25/2024 No growth at 2 days  Preliminary       Microbiology Results Abnormal       Procedure Component Value - Date/Time    MRSA Screen, PCR (Inpatient) - Swab, Nares [[id number]]  (Abnormal) Collected: 10/26/24 1208    Lab Status: Final result Specimen: Swab from Nares Updated: 10/26/24 1408     MRSA PCR Positive    Narrative:      The negative predictive value of this diagnostic test is high and should only be used to consider de-escalating anti-MRSA therapy. A positive result may indicate colonization with MRSA and must be correlated clinically.    Urine Culture - Urine, Straight Cath [[id number]]  (Abnormal)  (Susceptibility) Collected: 10/20/24 2123    Lab Status: Final result Specimen: Urine from Straight Cath Updated: 10/23/24 0902     Urine Culture >100,000 CFU/mL Enterococcus faecalis    Narrative:      Colonization of the urinary tract without infection is  common. Treatment is discouraged unless the patient is symptomatic, pregnant, or undergoing an invasive urologic procedure.    Susceptibility        Enterococcus faecalis      KB      Ampicillin Susceptible      Levofloxacin Susceptible      Nitrofurantoin Susceptible      Vancomycin Susceptible                           Respiratory Panel PCR w/COVID-19(SARS-CoV-2) GIANNI/SIENNA/ANNA/PAD/COR/ASHIA In-House, NP Swab in UTM/VTM, 2 HR TAT - Swab, Nasopharynx [[id number]]  (Abnormal) Collected: 10/22/24 1112    Lab Status: Final result Specimen: Swab from Nasopharynx Updated: 10/22/24 1228     ADENOVIRUS, PCR Not Detected     Coronavirus 229E Not Detected     Coronavirus HKU1 Not Detected     Coronavirus NL63 Not Detected     Coronavirus OC43 Not Detected     COVID19 Not Detected     Human Metapneumovirus Not Detected     Human Rhinovirus/Enterovirus Detected     Influenza A PCR Not Detected     Influenza B PCR Not Detected     Parainfluenza Virus 1 Not Detected     Parainfluenza Virus 2 Not Detected     Parainfluenza Virus 3 Not Detected     Parainfluenza Virus 4 Not Detected     RSV, PCR Not Detected     Bordetella pertussis pcr Not Detected     Bordetella parapertussis PCR Not Detected     Chlamydophila pneumoniae PCR Not Detected     Mycoplasma pneumo by PCR Not Detected    Narrative:      In the setting of a positive respiratory panel with a viral infection PLUS a negative procalcitonin without other underlying concern for bacterial infection, consider observing off antibiotics or discontinuation of antibiotics and continue supportive care. If the respiratory panel is positive for atypical bacterial infection (Bordetella pertussis, Chlamydophila pneumoniae, or Mycoplasma pneumoniae), consider antibiotic de-escalation to target atypical bacterial infection.            FL Video Swallow With Speech Single Contrast    Result Date: 10/29/2024  FL VIDEO SWALLOW W SPEECH SINGLE-CONTRAST Date of Exam: 10/29/2024 1:54 PM EDT  Indication: dysphagia.   Comparison: None available. Technique:   The speech pathologist administered food and/or liquid mixed with barium to the patient with cine/video imaging.  Imaging assistance was provided to the speech pathologist and an image was saved. Fluoroscopic Time: 1 minute and 54 seconds Number of Images: 6 associated fluoroscopic loops were saved Findings: Penetration of barium was seen during fluoroscopic guided modified barium swallowing series. Please see speech therapy report for full details and recommendations.     Impression: Impression: Fluoroscopy provided for a modified barium swallow. Penetration of barium was seen during swallowing evaluation. Please see speech therapy report for full details and recommendations. Report dictated by: Maggi Silverman PA-c  I have personally reviewed this case and agree with the findings above: Electronically Signed: Anjum Prado MD  10/29/2024 3:00 PM EDT  Workstation ID: [id number]     Results for orders placed during the hospital encounter of 01/15/24    Adult Transthoracic Echo Complete With Contrast if Necessary Per Protocol    Interpretation Summary    Left ventricular ejection fraction appears to be 56 - 60%.    Left ventricular wall thickness is consistent with hypertrophy.    Estimated right ventricular systolic pressure from tricuspid regurgitation is mildly elevated (35-45 mmHg).    There is a small (1-2cm) pericardial effusion.    No evidence for pericardial tamponade      Current medications:  Scheduled Meds:albuterol, 2.5 mg, Nebulization, Q6H - RT  amLODIPine, 10 mg, Per PEG Tube, Q24H  ARIPiprazole, 5 mg, Per G Tube, Daily  brimonidine, 1 drop, Both Eyes, TID  carvedilol, 12.5 mg, Per PEG Tube, Q12H  cefuroxime, 500 mg, Per PEG Tube, Q12H  cloNIDine, 0.2 mg, Per PEG Tube, Q8H  docusate sodium, 100 mg, Per G Tube, Daily  donepezil, 5 mg, Per PEG Tube, Nightly  doxycycline, 100 mg, Per PEG Tube, Q12H  enoxaparin, 40 mg, Subcutaneous,  Daily  FLUoxetine, 20 mg, Per PEG Tube, Daily  hydrALAZINE, 100 mg, Per PEG Tube, Q8H  insulin lispro, 2-7 Units, Subcutaneous, Q6H  lansoprazole, 30 mg, Per PEG Tube, Q AM  palliative care oral rinse, 5 mL, Mouth/Throat, 4x Daily  polyethylene glycol, 17 g, Per G Tube, Daily  QUEtiapine, 25 mg, Per PEG Tube, Nightly  sodium chloride, 10 mL, Intravenous, Q12H  terazosin, 5 mg, Per G Tube, Q12H  timolol, 1 drop, Both Eyes, BID  Valproic Acid, 150 mg, Nasogastric, Q8H      Continuous Infusions:     PRN Meds:.  acetaminophen **OR** acetaminophen **OR** acetaminophen    senna-docusate sodium **AND** polyethylene glycol **AND** [DISCONTINUED] bisacodyl **AND** bisacodyl    dextrose    glucagon (human recombinant)    hydrOXYzine    Magnesium Standard Dose Replacement - Follow Nurse / BPA Driven Protocol    nitroglycerin    [DISCONTINUED] ondansetron ODT **OR** ondansetron    Phosphorus Replacement - Follow Nurse / BPA Driven Protocol    Potassium Replacement - Follow Nurse / BPA Driven Protocol    sodium chloride    sodium chloride    Assessment & Plan   Assessment & Plan     Active Hospital Problems    Diagnosis  POA    Severe vascular dementia without behavioral disturbance, psychotic disturbance, mood disturbance, or anxiety [F01.C0]  Yes    Protein calorie malnutrition [E46]  Yes    S/P transmetatarsal amputation of foot, left [Z89.432]  Not Applicable    Anemia of chronic disease [D63.8]  Yes    Neurocognitive disorder [R41.9]  Yes    Type 2 diabetes mellitus [E11.9]  Yes    Essential hypertension [I10]  Yes    Hypertensive heart and chronic kidney disease 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 failure, with stage 1 through stage 4 chronic kidney disease, or unspecified chronic kidney disease [I13.10]  Yes      Resolved Hospital Problems    Diagnosis Date Resolved POA    **UTI (urinary tract infection) [N39.0] 10/30/2024 Yes    Hypothermia [T68.XXXA] 10/30/2024 Yes    Acute encephalopathy [G93.40] 10/30/2024 Yes    Vomiting [R11.10]  10/30/2024 Yes    Long term (current) use of insulin [Z79.4] 10/30/2024 Not Applicable        Brief Hospital Course to date:  Omkar Nava is a 69 y.o. male with a PMHx of hypertension, diabetes, chronic kidney disease, vascular dementia, dysphagia, and chronic iron def anemia admitted with increased vomiting, with concern for possible underlying infection. Found to be positive for Rhinovirus and UTI.  The patient initially on amoxicillin for UTI.  Overnight on 10/25 through 10/26 patient became hypothermic with a T minimum of 92 degrees, requiring external warming.  Chest x-ray revealed bibasilar opacities. Antibiotics broadened on 10/26.      Postviral pneumonia versus HAP  - On 10/25, patient became hypothermic requiring external warming (Tmin 92)  - Chest x-ray revealing bibasilar opacities, though decreased from previous chest xrays  - MRSA nares positive  - No leukocytosis, procal 0.05 -> 0.16, lactate 1.0  PLAN  - Patient much more alert/conversational after starting abx yesterday  - Continue Vanc and Zosyn for now. Given complex hospitalization, appreciate ID recommendations -> 10/29 transition to doxycycline and Ceftin  - bld cx 10/26: NGTD     Acute encephalopathy likely secondary to infection  Initial UTI/viral pneumonia  - UA with 4+ bacteria and urine cultures growing Enterococcus faecalis.    - Respiratory panel positive for rhinovirus  - Abx per above      Hyperkalemia, resolved  - s/p lokelma xs 2      JAYY on CKD-stage 2, resolved   - Baseline creatinine near 1.1-1.2  - Cr near baseline  - Status post IVF     Chronic aspiration  -Repeat barium swallow 10/29: Purée, thin liquids with long-term return of methods. Continue with PEG    Chronic anemia  - At baseline, cont to monitor.     HFpEF  HTN  - Echo with EF of 56 to 60%  - Continue carvedilol, clonidine, terazosin, hydralazine  - Hold diuretics     T2DM  - A1c 5.6 last hospitalization  - Tolerating tube feeds, per records, was on Levemir 6 U BID  on last admission. At this time, has not needed much insulin. On discharge, will d/c on Metformin        Dementia with agitation  Questionable seizure disorder  - Seroquel, Depakote, Abilify, prozac  -Valproic acid level uptrending    Expected Discharge Location and Transportation: long term care  Expected Discharge 10/31/2024  Expected Discharge Date: 10/30/2024; Expected Discharge Time: 12:00 PM     VTE Prophylaxis:  Pharmacologic VTE prophylaxis orders are present.         AM-PAC 6 Clicks Score (PT): 7 (10/30/24 0800)    CODE STATUS:   Code Status and Medical Interventions: CPR (Attempt to Resuscitate); Full Support   Ordered at: 10/21/24 0146     Level Of Support Discussed With:    Health Care Surrogate     Code Status (Patient has no pulse and is not breathing):    CPR (Attempt to Resuscitate)     Medical Interventions (Patient has pulse or is breathing):    Full Support       Farhana Lion MD  10/30/24

## 2024-10-30 NOTE — THERAPY TREATMENT NOTE
Acute Care - Speech Language Pathology   Swallow Treatment Note [facility]     Patient Name: Omkar Nava  : 1955  MRN: [id number]  Today's Date: 10/30/2024               Admit Date: 10/20/2024    Visit Dx:     ICD-10-CM ICD-9-CM   1. Acute cystitis without hematuria  N30.00 595.0   2. Pneumonia due to infectious organism, unspecified laterality, unspecified part of lung  J18.9 486   3. 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, without long-term current use of insulin    Acute osteomyelitis of left foot    Type 2 diabetes mellitus    Essential hypertension    Psychotic disorder    Neurocognitive disorder    S/P transmetatarsal amputation of foot, left    Abscess of left foot    Anemia of chronic disease    Aspiration pneumonia    Cervical spine pain    Chronic kidney disease    Closed head injury without loss of consciousness    Dementia    Dental cavities    Diarrhea of presumed infectious origin    Displaced fracture of proximal phalanx of left great toe, initial encounter for open fracture    Dysphagia    Erectile dysfunction    Generalized muscle weakness    Hallucinations    Hypertensive heart and chronic kidney disease 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 failure, with stage 1 through stage 4 chronic kidney disease, or unspecified chronic kidney disease    Insomnia due to medical condition    Iron deficiency anemia    Laceration without foreign body, left foot, subsequent encounter    Long term (current) use of insulin    Personal history of Methicillin resistant Staphylococcus aureus infection    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    Protein calorie malnutrition    Simple chronic bronchitis    Tobacco use    Type 2 diabetes mellitus with diabetic neuropathy, unspecified    Type 2 diabetes mellitus with diabetic chronic kidney disease    Acute type 1  respiratory failure    Severe vascular dementia without behavioral disturbance, psychotic disturbance, mood disturbance, or anxiety    UTI (urinary tract infection)    Vomiting    Acute encephalopathy    Hypothermia     Past Medical History:   Diagnosis Date    Anemia     Dementia     Diabetes mellitus     Dysphagia     GERD (gastroesophageal reflux disease)     History of alcohol abuse     History of cocaine use     History of marijuana use     Hypertension     Osteomyelitis     Poor historian     records obtained from nursing home records & his family    Visual impairment      Past Surgical History:   Procedure Laterality Date    AMPUTATION FOOT Left 10/18/2022    Procedure: PARTIAL FIRST RAY AMPUTATION LEFT;  Surgeon: Yeison Petty MD;  Location:  [facility];  Service: Orthopedics;  Laterality: Left;    AMPUTATION FOOT Left 12/5/2022    Procedure: Transmetatarsal of Left Foot;  Surgeon: Yeison Petty MD;  Location:  [facility];  Service: Orthopedics;  Laterality: Left;    ENDOSCOPY N/A 3/14/2024    Procedure: ESOPHAGOGASTRODUODENOSCOPY WITH JEJUNAL TUBE INSERTION AT BEDSIDE;  Surgeon: Brunner, Mark I, MD;  Location:  [facility];  Service: Gastroenterology;  Laterality: N/A;    ENDOSCOPY W/ PEG TUBE PLACEMENT N/A 4/1/2024    Procedure: ESOPHAGOGASTRODUODENOSCOPY WITH PERCUTANEOUS ENDOSCOPIC GASTROSTOMY TUBE INSERTION;  Surgeon: Brunner, Mark I, MD;  Location:  [facility];  Service: Gastroenterology;  Laterality: N/A;  EGD with PEG placement.  Secured at 3.5 cm    EYE SURGERY         SLP Recommendation and Plan  SLP Swallowing Diagnosis: mod-severe, oral dysphagia, mild, pharyngeal dysphagia (10/30/24 0845)  SLP Diet Recommendation: puree, thin liquids, long term alternate methods of nutrition/hydration, other (see comments) (+ PEG to ensure nutritional needs met) (10/30/24 0845)  Recommended Precautions and Strategies: general aspiration precautions, 1:1 supervision, assist with feeding, small bites of  food and sips of liquid, no straw, check mouth frequently for oral residue/pocketing, reflux precautions (10/30/24 0845)  SLP Rec. for Method of Medication Administration: meds via alternate route (10/30/24 0845)     Monitor for Signs of Aspiration: notify SLP if any concerns (10/30/24 0845)     Swallow Criteria for Skilled Therapeutic Interventions Met: demonstrates skilled criteria (10/30/24 0845)  Anticipated Discharge Disposition (SLP): skilled nursing facility (10/30/24 0845)  Rehab Potential/Prognosis, Swallowing: re-evaluate goals as necessary (10/30/24 0845)  Therapy Frequency (Swallow): 5 days per week (10/30/24 0845)  Predicted Duration Therapy Intervention (Days): 2 weeks (10/30/24 0845)  Oral Care Recommendations: Oral Care BID/PRN, Suction toothbrush (10/30/24 0845)        Daily Summary of Progress (SLP): progress toward functional goals as expected (10/30/24 0845)               Treatment Assessment (SLP): continued, oral dysphagia, suspected, pharyngeal dysphagia (10/30/24 0845)     Plan for Continued Treatment (SLP): continue treatment per plan of care (10/30/24 0845)                SWALLOW EVALUATION (Last 72 Hours)       SLP Adult Swallow Evaluation       Row Name 10/30/24 0845 10/29/24 1420 10/29/24 0910 10/28/24 0830          Rehab Evaluation    Document Type therapy note (daily note)  - evaluation  - re-evaluation  - re-evaluation  -     Subjective Information no complaints  - no complaints  - no complaints  - no complaints  -     Patient Observations alert;cooperative;agree to therapy  - alert;cooperative  - alert;cooperative  - lethargic;cooperative  -     Patient/Family/Caregiver Comments/Observations none present  - No family present  - No family present  - no family present  -     Patient Effort adequate  - good  - good  - fair  -     Symptoms Noted During/After Treatment none  - none  - none  - none  -     Oral Care mouth wash rinse  - -- --  --        General Information    Patient Profile Reviewed yes  - yes  - yes  - yes  -     Pertinent History Of Current Problem -- See initial eval, pt referred [place] [facility]  - See initial eval  - see initial eval  -CJ     Current Method of Nutrition -- NPO;gastrostomy feedings  - NPO;gastrostomy feedings  - NPO;gastrostomy feedings  -     Precautions/Limitations, Vision -- vision impairment, bilaterally  - vision impairment, bilaterally  - vision impairment, bilaterally  -     Precautions/Limitations, Hearing -- WFL;for purposes of [facility]  - WFL;for purposes of [facility] WFL;for purposes of eval  -     Prior Level of Function-Communication -- cognitive-linguistic impairment  - cognitive-linguistic impairment  - cognitive-linguistic impairment  -     Prior Level of Function-Swallowing -- mechanical ground textures;thin liquids;alternative feeding method;other (see comments)  - mechanical ground textures;thin liquids;alternative feeding method;other (see comments)  - mechanical ground textures;thin liquids;alternative feeding method;other (see comments)  -     Plans/Goals Discussed with -- patient  - patient  - patient  -     Barriers to Rehab -- medically complex;visual deficit;previous functional deficit;cognitive status  - medically complex;visual deficit;previous functional deficit;cognitive status  - medically complex;visual deficit;previous functional deficit;cognitive status  -     Patient's Goals for Discharge -- patient did not state  - patient did not state  - patient did not state  -        Pain    Pretreatment Pain Rating 0/10 - no pain  - -- -- --     Posttreatment Pain Rating 0/10 - no pain  - -- -- --     Additional Documentation -- Pain Scale: FACES Pre/Post-Treatment (Group)  [facility] Pain Scale: FACES Pre/Post-Treatment (Group)  - --        Pain Scale: FACES Pre/Post-Treatment    Pain: FACES Scale, Pretreatment 0-->no hurt  - 0-->no hurt  -  0-->no hurt  - 0-->no hurt  -     Posttreatment Pain Rating 0-->no hurt  - 0-->no hurt  - 0-->no hurt  - 0-->no hurt  -        Oral Motor Structure and Function    Dentition Assessment -- -- missing teeth  - missing teeth  -     Secretion Management -- -- anterior loss  - anterior loss  -     Mucosal Quality -- -- sticky;dry  - sticky  -        Oral Musculature and Cranial Nerve Assessment    Oral Motor General Assessment -- -- generalized oral motor weakness  - generalized oral motor weakness  -        General Eating/Swallowing Observations    Respiratory Support Currently in Use -- -- room air  - nasal cannula  -     Eating/Swallowing Skills -- -- fed by SLP;unable to perform self-feeding  - fed by SLP;unable to perform self-feeding  -     Positioning During Eating -- -- upright 90 degree;upright in bed  - upright 90 degree;upright in bed  -     Utensils Used -- -- spoon;cup;straw  - spoon;cup  -     Consistencies Trialed -- -- ice chips;thin liquids;nectar/syrup-thick liquids;pureed  - pureed;ice chips;thin liquids;nectar/syrup-thick liquids  -        Clinical Swallow Eval    Oral Prep Phase -- -- impaired  - impaired  -     Oral Transit -- -- impaired  - impaired  -     Oral Residue -- -- impaired  - impaired  -     Pharyngeal Phase -- -- suspected pharyngeal impairment  - suspected pharyngeal impairment  -     Clinical Swallow Evaluation Summary -- -- Pt awake/alert this AM, able to accept all PO presentations. Intermittent throat clear w/ thins & wet vocal quality upon completion of thin/NTL trials. No overt s/s of aspiration w/ pureed trials. Cont w/ oral phase deficits, suspect generalized weakness/cognitive deficits impacting. Recommend cont NPO w/ PEG. Plan for [facility] this [facility] --        Oral Prep Concerns    Oral Prep Concerns -- -- oral holding;anterior loss;incomplete or weak lip closure around spoon;increased prep time  - increased prep  time;incomplete or weak lip closure around spoon  -     Oral Holding -- -- pudding  - --     Incomplete or Weak Lip Closure Around Spoon -- -- thin;nectar  - --     Anterior Loss -- -- thin;nectar  - --     Increased Prep Time -- -- pudding  - pudding  -        Oral Transit Concerns    Oral Transit Concerns -- -- delayed initiation of bolus transit  - delayed initiation of bolus transit  -     Delayed Intiation of Bolus Transit -- -- all consistencies  - all consistencies  -        Pharyngeal Phase Concerns    Pharyngeal Phase Concerns -- -- throat clear  - wet vocal quality  -     Wet Vocal Quality -- -- thin;nectar  - thin;nectar  -     Throat Clear -- -- thin  - --     Pharyngeal Phase Concerns, Comment -- -- -- Pt continus w/ limited participation and minimal po presentations accepted. Initially was agreeable however pt quickly started to refuse further presentations. Continues w/ overt s/s of aspiration w/ thins/nectar, refused puree presentations. Pt felt to be at high risk and given limited participation not appropriate for instrumental re-evaluation. Continue NPO w/ PEG  -CJ        MBS/VFSS    Utensils Used -- spoon;straw;cup  - -- --     Consistencies Trialed -- thin liquids;pudding thick  - -- --        MBS/VFSS Interpretation    Oral Prep Phase -- impaired oral phase of swallowing  - -- --     Oral Transit Phase -- impaired  - -- --     Oral Residue -- impaired  - -- --     VFSS Summary -- Moderate to severe oral and mild pharyngeal dysphagia. Prespill w/ thins to the pyriforms. Suspect cognitive status impacting severity of oral phase. Silent aspiration of thins via straw after the swallow x1, u/a to replicate accross additional trials. Intermittent, high/transient penetration w/ thins via cup presentation; all material observed to fully clear upon completion of the swallow. Solid trials deferred 2' cog status & severity of oral phase deficits. Although pt safe for  PO diet, suspect will still need PEG given concern that pt will be u/a to meet nutrition needs w/ PO alone. Ok to initiate pureed diet w/ thin liquids, no straw, 1:1 supervision/assistance, small/single bites & sip, check for pocketing/residue  - -- --        Oral Preparatory Phase    Oral Preparatory Phase -- reduced lip opening;reduced lip closure around utensil;anterior loss;oral holding;prolonged manipulation  - -- --     Reduced Lip Opening -- all consistencies tested;secondary to impaired cognitive status;secondary to reduced lingual range of motion;secondary to reduced lingual strength  - -- --     Reduced Lip Closure Around Utensil -- all consistencies tested;secondary to impaired cognitive status;secondary to reduced lingual strength;secondary to reduced lingual range of motion  - -- --     Anterior Loss -- thin liquids;secondary to impaired cognitive status;secondary to reduced lingual range of motion;secondary to reduced lingual strength;secondary to reduced labial seal  - -- --     Oral Holding -- all consistencies tested;secondary to impaired cognitive status;secondary to reduced lingual strength;secondary to reduced lingual range of motion  - -- --     Prolonged Manipulation -- pudding/puree;secondary to impaired cognitive status;secondary to reduced lingual range of motion;secondary to reduced lingual strength  - -- --        Oral Transit Phase    Impaired Oral Transit Phase -- tongue pumping;premature spillage of liquids into pharynx  - -- --     Tongue Pumping -- all consistencies tested;secondary to impaired cognitive status;discoordination of lingual movement;secondary to reduced lingual control  - -- --     Premature Spillage of Liquids into Pharynx -- thin liquids;secondary to impaired cognitive status;discoordination of lingual movement;secondary to reduced lingual control  - -- --        Oral Residue    Impaired Oral Residue -- diffuse residue throughout oral cavity  - --  --     Diffuse Residue throughout Oral Cavity -- pudding/puree;secondary to reduced lingual range of motion;secondary to reduced lingual strength  - -- --     Response to Oral Residue -- partial residue clearance;with liquid wash;with spontaneous subsequent swallow  - -- --        Initiation of Pharyngeal Swallow    Initiation of Pharyngeal Swallow -- bolus in pyriform sinuses  - -- --     Pharyngeal Phase -- impaired pharyngeal phase of swallowing  - -- --     Aspiration During the Swallow -- --  - -- --     Aspiration After the Swallow -- thin liquids;secondary to residue;in valleculae;in pyriform sinuses;in laryngeal vestibule;other (see comments)  via straw only  - -- --     Depth of Penetration -- deep  - -- --     Response to Penetration -- No  -MH -- --     No spontaneous response to penetration and -- could not produce cough response despite cue  - -- --     Response to Aspiration -- No  - -- --     No spontaneous response to aspiration and -- could not produce cough response despite cue  - -- --     Rosenbek's Scale -- thin:;8--->level 8;pudding/puree:;1--->level 1  thin level 8 via straw only  - -- --     Pharyngeal Residue -- all consistencies tested;diffuse within pharynx;secondary to reduced hyolaryngeal excursion;secondary to reduced laryngeal elevation;secondary to reduced posterior pharyngeal wall stripping  - -- --     Response to Residue -- partial residue clearance  - -- --     Attempted Compensatory Maneuvers -- additional subsequent swallow;effortful (hard swallow);bolus size;bolus presentation style;other (see comments)  Did not attempt more complex compensations 2' cog status  - -- --     Successful Compensatory Maneuver Competency -- patient unable to adequately demonstrate/teach back compensations  - -- --        Esophageal Phase    Esophageal Phase -- esophageal retention  - -- --        SLP Evaluation Clinical Impression    SLP Swallowing Diagnosis  mod-severe;oral dysphagia;mild;pharyngeal dysphagia  - mod-severe;oral dysphagia;mild;pharyngeal dysphagia  - oral dysphagia;suspected pharyngeal dysphagia  - oral dysphagia;suspected pharyngeal dysphagia  -     Functional Impact risk of aspiration/pneumonia  - risk of aspiration/pneumonia  - risk of aspiration/pneumonia  - risk of aspiration/pneumonia;risk of malnutrition  -     Rehab Potential/Prognosis, Swallowing re-evaluate goals as necessary  - re-evaluate goals as necessary  - re-evaluate goals as necessary  - re-evaluate goals as necessary  -     Swallow Criteria for Skilled Therapeutic Interventions Met demonstrates skilled criteria  - demonstrates skilled criteria  - demonstrates skilled criteria  - demonstrates skilled criteria  -        SLP Treatment Clinical Impressions    Treatment Assessment (SLP) continued;oral dysphagia;suspected;pharyngeal dysphagia  - -- -- --     Daily Summary of Progress (SLP) progress toward functional goals as expected  - -- -- --     Barriers to Overall Progress (SLP) Lethargy;Cognitive status;Baseline deficits  - -- -- --     Plan for Continued Treatment (SLP) continue treatment per plan of care  - -- -- --     Care Plan Review evaluation/treatment results reviewed  - -- -- --        Recommendations    Therapy Frequency (Swallow) 5 days per week  - 5 days per week  - -- 5 days per week  -     Predicted Duration Therapy Intervention (Days) 2 weeks  - 2 weeks  - 2 weeks  - 2 weeks  -     SLP Diet Recommendation puree;thin liquids;long term alternate methods of nutrition/hydration;other (see comments)  + PEG to ensure nutritional needs met  - puree;thin liquids;long term alternate methods of nutrition/hydration;other (see comments)  + PEG to ensure nutritional needs met  - NPO;long term alternate methods of nutrition/hydration  - NPO;long term alternate methods of nutrition/hydration  -     Recommended Diagnostics  -- -- reassess via VFSS ([facility])  - reassess via clinical swallow evaluation  10/29  -     Recommended Precautions and Strategies general aspiration precautions;1:1 supervision;assist with feeding;small bites of food and sips of liquid;no straw;check mouth frequently for oral residue/pocketing;reflux precautions  - general aspiration precautions;1:1 supervision;assist with feeding;small bites of food and sips of liquid;no straw;check mouth frequently for oral residue/pocketing;reflux precautions  - -- general aspiration precautions  -     Oral Care Recommendations Oral Care BID/PRN;Suction toothbrush  - Oral Care BID/PRN;Suction toothbrush  - Oral Care BID/PRN;Suction toothbrush  - Oral Care BID/PRN;Suction toothbrush  -     SLP Rec. for Method of Medication Administration meds via alternate route  - meds via alternate route  - meds via alternate route  - meds via alternate route  -     Monitor for Signs of Aspiration notify SLP if any concerns  - notify SLP if any concerns  - notify SLP if any concerns  - notify SLP if any concerns  -     Anticipated Discharge Disposition (SLP) skilled nursing facility  - skilled nursing facility  - skilled nursing facility  - skilled nursing facility  -               User Key  (r) = Recorded By, (t) = Taken By, (c) = Cosigned By      Initials Name Effective Dates     Eliz Cartagena, MS CCC-SLP 10/22/24 -      Maya Riddle, MS CCC-SLP 06/16/21 -      Kathy Gonzales, MS [facility]-SLP 05/12/23 -                     EDUCATION  The patient has been educated in the following areas:   Home Exercise Program (HEP) Dysphagia (Swallowing Impairment) Oral Care/Hydration Modified Diet Instruction.        SLP GOALS       Row Name 10/30/24 0845 10/29/24 8911          (LTG) Patient will demonstrate functional swallow for    Diet Texture (Demonstrate functional swallow) soft to chew (ground) textures  - soft to chew (ground) textures  -      Liquid viscosity (Demonstrate functional swallow) thin liquids  - thin liquids  -     [employer] (Demonstrate functional swallow) with maximum cues (25-49% accuracy)  -CH with maximum cues (25-49% accuracy)  -     Time Frame (Demonstrate functional swallow) 1 week  - 1 week  -     Progress/Outcomes (Demonstrate functional swallow) continuing progress toward goal  -CH goal ongoing  -     Comment (Demonstrate functional swallow) Trialed soft solids with prolonged mastication and oral residue. Cough with liquid wash after cracker trial.  -CH --        (STG) Patient will tolerate trials of    Consistencies Trialed (Tolerate trials) pureed textures;thin liquids  - pureed textures;thin liquids  -     Desired Outcome (Tolerate trials) without signs/symptoms of aspiration;with adequate oral prep/transit/clearance  - without signs/symptoms of aspiration;with adequate oral prep/transit/clearance  -     [employer] (Tolerate trials) with 1:1 assist/ supervision  - with 1:1 assist/ supervision  -     Time Frame (Tolerate trials) 1 week  - 1 week  -     Progress/Outcomes (Tolerate trials) continuing progress toward goal  - new goal  -     Comment (Tolerate trials) No s/s of aspiration  -CH --        (STG) Patient will tolerate therapeutic trials of    Consistencies Trialed (Tolerate therapeutic trials) pureed textures;soft to chew (ground) textures  - pureed textures;soft to chew (ground) textures  -     Desired Outcome (Tolerate therapeutic trials) without signs/symptoms of aspiration;with adequate oral prep/transit/clearance  - without signs/symptoms of aspiration;with adequate oral prep/transit/clearance  -     [employer] (Tolerate therapeutic trials) independently (over 90% accuracy)  - independently (over 90% accuracy)  -     Time Frame (Tolerate therapeutic trials) 1 week  - 1 week  -     Progress/Outcomes (Tolerate therapeutic trials) continuing progress toward goal   -CH new goal  -     Comment (Tolerate therapeutic trials) Trialed soft solids with prolonged mastication and oral residue. Cough with liquid wash after cracker trial. Tolerated pureed and thin liquid trials without s/s of aspiration.  -CH --        (STG) Labial Strengthening Goal 1 (SLP)    Activity (Labial Strengthening Goal 1, SLP) increase labial tone  -CH increase labial tone  -     Increase Labial Tone labial resistance exercises  - labial resistance exercises  -     [place]/Accuracy (Labial Strengthening Goal 1, SLP) with maximum cues (25-49% accuracy)  -CH with maximum cues (25-49% accuracy)  -     Time Frame (Labial Strengthening Goal 1, SLP) 1 week  -CH 1 week  -     Progress/Outcomes (Labial Strengthening Goal 1, SLP) goal ongoing  -CH goal ongoing  -     Comment (Labial Strengthening Goal 1, SLP) 0% w/ max cues  -CH --        (STG) Lingual Strengthening Goal 1 (SLP)    Activity (Lingual Strengthening Goal 1, SLP) increase tongue back strength  -CH increase tongue back strength  -     Increase Tongue Back Strength lingual resistance exercises  -CH lingual resistance exercises  -     [place]/Accuracy (Lingual Strengthening Goal 1, SLP) with maximum cues (25-49% accuracy)  -CH with maximum cues (25-49% accuracy)  -     Time Frame (Lingual Strengthening Goal 1, SLP) 1 week  -CH 1 week  -     Progress/Outcomes (Lingual Strengthening Goal 1, SLP) progress slower than expected  - goal ongoing  -     Comment (Lingual Strengthening Goal 1, SLP) 10% with max cues  -CH --        (STG) Pharyngeal Strengthening Exercise Goal 1 (SLP)    Activity (Pharyngeal Strengthening Goal 1, SLP) increase timing;increase superior movement of the hyolaryngeal complex;increase anterior movement of the hyolaryngeal complex;increase squeeze/positive pressure generation  -CH increase timing;increase superior movement of the hyolaryngeal complex;increase anterior movement of the hyolaryngeal  complex;increase squeeze/positive pressure generation  -     Increase Timing prepping - 3 second prep or suck swallow or 3-step swallow  -CH prepping - 3 second prep or suck swallow or 3-step swallow  -MH     Increase Superior Movement of the Hyolaryngeal Complex falsetto  -CH falsetto  -MH     Increase Anterior Movement of the Hyolaryngeal Complex chin tuck against resistance (CTAR)  -CH chin tuck against resistance (CTAR)  -MH     Increase Squeeze/Positive Pressure Generation hard effortful swallow  -CH hard effortful swallow  -     [place]/Accuracy (Pharyngeal Strengthening Goal 1, SLP) with maximum cues (25-49% accuracy)  -CH with maximum cues (25-49% accuracy)  -     Time Frame (Pharyngeal Strengthening Goal 1, SLP) 1 week  -CH 1 week  -MH     Progress/Outcomes (Pharyngeal Strengthening Goal 1, SLP) continuing progress toward goal  -CH goal ongoing  -MH     Comment (Pharyngeal Strengthening Goal 1, SLP) attempted all, patient completed CTAR and 123 prep with mod/max cues.  - --               User Key  (r) = Recorded By, (t) = Taken By, (c) = Cosigned By      Initials Name Provider Type     Maya Riddle, MS CCC-SLP Speech and Language Pathologist     Kathy Gonzales MS CCC-SLP Speech and Language Pathologist                         Time Calculation:    Time Calculation- SLP       Row Name 10/30/24 0924             Time Calculation- SLP    SLP Start Time 0845  -      SLP Received On 10/30/24  -         Untimed Charges    74282-NL Treatment Swallow Minutes 40  -CH         Total Minutes    Untimed Charges Total Minutes 40  -CH       Total Minutes 40  -CH                User Key  (r) = Recorded By, (t) = Taken By, (c) = Cosigned By      Initials Name Provider Type    Maya Broderick, MS CCC-SLP Speech and Language Pathologist                    Therapy Charges for Today       Code Description Service Date Service Provider Modifiers Qty    [id number] HC ST TREATMENT SWALLOW 3  10/30/2024 Maya Riddle, MS CCC-SLP GN 1                 Maya Riddle, MS SARA-SLP  10/30/2024

## 2024-10-30 NOTE — PLAN OF CARE
Goal Outcome Evaluation:                   Anticipated Discharge Disposition (SLP): skilled nursing facility          SLP Swallowing Diagnosis: mod-severe, oral dysphagia, mild, pharyngeal dysphagia (10/30/24 0845)  Treatment Assessment (SLP): continued, oral dysphagia, suspected, pharyngeal dysphagia (10/30/24 0845)     Plan for Continued Treatment (SLP): continue treatment per plan of care (10/30/24 0845)

## 2024-10-30 NOTE — PROGRESS NOTES
[facility]    INFECTIOUS DISEASE PROGRESS NOTE    Omkar Nava  1955  [id number]    Date of consult: 10/28/2024    Admit date: 10/20/2024    Requesting Provider: No Known Provider  Evaluating physician: Rayray Gordon MD  Reason for Consultation: Aspiration pneumonia  Chief Complaint: Above      Subjective   History of present illness:  Patient is a  69 y.o.  Yr old male with a history of dementia, diabetes mellitus type 2, blindness, essential hypertension, polysubstance use, nursing home resident, recent COVID-19 September 2024, dysphagia, GERD, who developed nausea and vomiting potentially related to intolerance of tube feedings on 10/20/2024.  Patient unable to feed himself.  Patient developed increasing shortness of breath and coughing and was admitted to [facility] on 10/20/2024 with CT findings consistent with possible pneumonia.  Urine culture on admission was also positive for Enterococcus faecalis, with negative blood cultures x 2.  Respiratory PCR positive for rhinovirus and 10/22.  MRSA screen positive.  Patient is unable to give history.  I was consulted on 10/28/2024 for further evaluation and treatment.  Patient currently on vancomycin and piperacillin/tazobactam since 10/26.  The patient had received amoxicillin 10/23 until 10/26.  He was asymptomatic from a urinary tract standpoint.    10/29/2024 history reviewed.  No high fevers or chills.  Breathing improved.  No fever.    10/30/2024 history reviewed.  Patient feels better.  Minimal respiratory complaints.  Tolerating cefuroxime and doxycycline until 11/4 for recurrent aspiration pneumonia.  No fever.  Recurrent issue given his chronic debilitated state including dementia and blindness.  Medication through PEG tube.    Past Medical History:   Diagnosis Date    Anemia     Dementia     Diabetes mellitus     Dysphagia     GERD (gastroesophageal reflux disease)     History of alcohol abuse      History of cocaine use     History of marijuana use     Hypertension     Osteomyelitis     Poor historian     records obtained from nursing home records & his family    Visual impairment        Past Surgical History:   Procedure Laterality Date    AMPUTATION FOOT Left 10/18/2022    Procedure: PARTIAL FIRST RAY AMPUTATION LEFT;  Surgeon: Yeison Petty MD;  Location:  [facility];  Service: Orthopedics;  Laterality: Left;    AMPUTATION FOOT Left 12/5/2022    Procedure: Transmetatarsal of Left Foot;  Surgeon: Yeison Petty MD;  Location:  [facility];  Service: Orthopedics;  Laterality: Left;    ENDOSCOPY N/A 3/14/2024    Procedure: ESOPHAGOGASTRODUODENOSCOPY WITH JEJUNAL TUBE INSERTION AT BEDSIDE;  Surgeon: Brunner, Mark I, MD;  Location:  [facility];  Service: Gastroenterology;  Laterality: N/A;    ENDOSCOPY W/ PEG TUBE PLACEMENT N/A 4/1/2024    Procedure: ESOPHAGOGASTRODUODENOSCOPY WITH PERCUTANEOUS ENDOSCOPIC GASTROSTOMY TUBE INSERTION;  Surgeon: Brunner, Mark I, MD;  Location:  [facility];  Service: Gastroenterology;  Laterality: N/A;  EGD with PEG placement.  Secured at 3.5 cm    EYE SURGERY         Pediatric History   Patient Parents    Not on file     Other Topics Concern    Not on file   Social History Narrative    Caffeine 0-1 servings per day    Patient lives at home .   Previous substance use, no current substance use or cigarette use.    family history includes Diabetes in his brother, brother, father, maternal grandfather, maternal grandmother, mother, and sister; Hypertension in his brother, brother, father, and mother.    No Known Allergies    Immunization History   Administered Date(s) Administered    COVID-19 (MODERNA) 12YRS+ (SPIKEVAX) 10/16/2023, 09/24/2024    COVID-19 (MODERNA) 1st,2nd,3rd Dose Monovalent 06/09/2021, 07/19/2021    COVID-19 (MODERNA) Monovalent Original Booster 04/11/2022    Fluzone (or Fluarix & Flulaval for VFC) >6mos 11/29/2023       Medication:  @Scheduled  Meds:albuterol, 2.5 mg, Nebulization, Q6H - RT  amLODIPine, 10 mg, Per PEG Tube, Q24H  ARIPiprazole, 5 mg, Per G Tube, Daily  brimonidine, 1 drop, Both Eyes, TID  carvedilol, 12.5 mg, Per PEG Tube, Q12H  cefuroxime, 500 mg, Per PEG Tube, Q12H  cloNIDine, 0.2 mg, Per PEG Tube, Q8H  docusate sodium, 100 mg, Per G Tube, Daily  donepezil, 5 mg, Per PEG Tube, Nightly  doxycycline, 100 mg, Per PEG Tube, Q12H  enoxaparin, 40 mg, Subcutaneous, Daily  FLUoxetine, 20 mg, Per PEG Tube, Daily  hydrALAZINE, 100 mg, Per PEG Tube, Q8H  insulin lispro, 2-7 Units, Subcutaneous, Q6H  lansoprazole, 30 mg, Per PEG Tube, Q AM  palliative care oral rinse, 5 mL, Mouth/Throat, 4x Daily  polyethylene glycol, 17 g, Per G Tube, Daily  QUEtiapine, 25 mg, Per PEG Tube, Nightly  sodium chloride, 10 mL, Intravenous, Q12H  terazosin, 5 mg, Per G Tube, Q12H  timolol, 1 drop, Both Eyes, BID  Valproic Acid, 150 mg, Nasogastric, Q8H      Continuous Infusions:     PRN Meds:.  acetaminophen **OR** acetaminophen **OR** acetaminophen    senna-docusate sodium **AND** polyethylene glycol **AND** [DISCONTINUED] bisacodyl **AND** bisacodyl    dextrose    glucagon (human recombinant)    hydrOXYzine    Magnesium Standard Dose Replacement - Follow Nurse / BPA Driven Protocol    nitroglycerin    [DISCONTINUED] ondansetron ODT **OR** ondansetron    Phosphorus Replacement - Follow Nurse / BPA Driven Protocol    Potassium Replacement - Follow Nurse / BPA Driven Protocol    sodium chloride    sodium chloride     Please refer to the medical record for a full medication list    Review of Systems:    Constitutional-- No Fever, chills or sweats.  Appetite good, and no malaise. No fatigue.  HEENT-- No new vision, hearing or throat complaints.  No epistaxis or oral sores.  Denies odynophagia or dysphagia.  No odynophagia or dysphagia. No headache, photophobia or neck stiffness.  CV-- No chest pain, palpitation or syncope  Resp--some SOB/occasional nonproductive cough/no  "hemoptysis  GI- No nausea, vomiting, or diarrhea.  No hematochezia, melena, or hematemesis. Denies jaundice or chronic liver disease.  -- No dysuria, hematuria, or flank pain.  Denies hesitancy, urgency.  Lymph- no swollen lymph nodes in neck/axilla or groin.   Heme- No active bruising or bleeding; no Hx of DVT or PE.  MS-- no swelling or pain in the bones or joints of arms/legs.  No new back pain.  Neuro-- No acute focal weakness or numbness in the arms or legs.  No seizures.  Skin--No rashes or lesions    Physical Exam:   Vital Signs   Temp:  [97.1 °F (36.2 °C)-97.8 °F (36.6 °C)] 97.8 °F (36.6 °C)  Heart Rate:  [42-53] 50  Resp:  [16-18] 16  BP: (135-163)/(58-84) 149/75    Blood pressure 149/75, pulse 50, temperature 97.8 °F (36.6 °C), temperature source Axillary, resp. rate 16, height 172.7 cm (68\"), weight 77 kg (169 lb 12.8 oz), SpO2 94%.  GENERAL: Awake and alert, in no acute distress. Appears older than stated age.  Resting in bed.  HEENT:  Normocephalic, atraumatic.  Oropharynx without thrush. Dentition in fair repair. No cervical adenopathy. No neck masses.  Ears externally normal, Nose externally normal.  EYES:  No conjunctival injection. No icterus. EOM full.  LYMPHATICS: No lymphadenopathy of the neck or axillary or inguinal regions.   HEART: No murmur, gallop, or pericardial friction rub. Reg rate rhythm, No JVD at 45 degrees.  LUNGS: Basilar rales. No respiratory distress, no use of accessory muscles.  ABDOMEN: Soft, nontender, nondistended. No appreciable HSM.  Bowel sounds normal.  SKIN: Warm and dry without cutaneous eruptions.  No nodules.  PEG site okay.  PSYCHIATRIC: Mental status lucid. No confusion.  EXT:  No cellulitic change.  NEURO: Oriented to name, nonfocal except blind      Results Review:   I reviewed the patient's new clinical results.  I reviewed the patient's new imaging results and agree with the interpretation.  I reviewed the patient's other test results and agree with the " "interpretation    Results from last 7 days   Lab Units 10/29/24  0842 10/28/24  0922 10/27/24  0850   WBC 10*3/mm3 9.63 10.38 7.53   HEMOGLOBIN g/dL 7.9* 8.2* 8.0*   HEMATOCRIT % 25.2* 25.0* 24.1*   PLATELETS 10*3/mm3 217 245 209     Results from last 7 days   Lab Units 10/29/24  0842   SODIUM mmol/L 138   POTASSIUM mmol/L 5.2   CHLORIDE mmol/L 104   CO2 mmol/L 26.0   BUN mg/dL 25*   CREATININE mg/dL 1.00   GLUCOSE mg/dL 195*   CALCIUM mg/dL 8.7     Results from last 7 days   Lab Units 10/29/24  0842   ALK PHOS U/L 100   BILIRUBIN mg/dL <0.2   ALT (SGPT) U/L 21   AST (SGOT) U/L 15         Results from last 7 days   Lab Units 10/29/24  0842   CRP mg/dL 1.33*     Results from last 7 days   Lab Units 10/28/24  0922   VANCOMYCIN TR mcg/mL 16.00     Results from last 7 days   Lab Units 10/25/24  2219   LACTATE mmol/L 1.0     Estimated Creatinine Clearance: 78.1 mL/min (by C-G formula based on SCr of 1 mg/dL).  CPK          9/13/2024    07:46   Common Labs   Creatine Kinase 308       Procalitonin Results:      Lab 10/28/24  0922 10/27/24  0850 10/26/24  1206   PROCALCITONIN 0.12 0.14 0.16      Brief Urine Lab Results  (Last result in the past 365 days)        Color   Clarity   Blood   Leuk Est   Nitrite   Protein   CREAT   Urine HCG        10/20/24 2123 Yellow   Clear   Negative   Negative   Negative   100 mg/dL (2+)                  No results found for: \"SITE\", \"ALLENTEST\", \"PHART\", \"PZK4KRE\", \"PO2ART\", \"JSQ5ELL\", \"BASEEXCESS\", \"S1NXACGU\", \"HGBBG\", \"HCTABG\", \"OXYHEMOGLOBI\", \"METHHGBN\", \"CARBOXYHGB\", \"CO2CT\", \"BAROMETRIC\", \"MODALITY\", \"FIO2\"     Microbiology:      Results for orders placed or performed during the hospital encounter of 10/20/24   Blood Culture - Blood, Hand, Right    Specimen: Hand, Right; Blood   Result Value Ref Range    Blood Culture No growth at 4 days       Urine Culture          10/20/2024    21:23   Urine Culture   Urine Culture >100,000 CFU/mL Enterococcus faecalis            Brief Urine Lab " Results  (Last result in the past 365 days)        Color   Clarity   Blood   Leuk Est   Nitrite   Protein   CREAT   Urine HCG        10/20/24 2123 Yellow   Clear   Negative   Negative   Negative   100 mg/dL (2+)                   Blood Culture   Date Value Ref Range Status   10/25/2024 No growth at 2 days  Preliminary   10/25/2024 No growth at 2 days  Preliminary     Urinalysis 6-10 WBCs 10/20.    Blood Culture   Date Value Ref Range Status   10/25/2024 No growth at 2 days  Preliminary   10/25/2024 No growth at 2 days  Preliminary   (      Radiology:  Imaging Results (Last 72 Hours)       Procedure Component Value Units Date/Time    FL Video Swallow With Speech Single Contrast [[id number]] Collected: 10/29/24 1427     Updated: 10/29/24 1503    Narrative:      FL VIDEO SWALLOW W SPEECH SINGLE-CONTRAST    Date of Exam: 10/29/2024 1:54 PM EDT    Indication: dysphagia.       Comparison: None available.    Technique:   The speech pathologist administered food and/or liquid mixed with barium to the patient with cine/video imaging.  Imaging assistance was provided to the speech pathologist and an image was saved.    Fluoroscopic Time: 1 minute and 54 seconds    Number of Images: 6 associated fluoroscopic loops were saved    Findings:  Penetration of barium was seen during fluoroscopic guided modified barium swallowing series. Please see speech therapy report for full details and recommendations.      Impression:      Impression:  Fluoroscopy provided for a modified barium swallow. Penetration of barium was seen during swallowing evaluation. Please see speech therapy report for full details and recommendations.      Report dictated by: Maggi Silverman PA-c      I have personally reviewed this case and agree with the findings above:    Electronically Signed: Anjum Prado MD    10/29/2024 3:00 PM EDT    Workstation ID: [id number]            IMPRESSION:     Likely recurrent aspiration pneumonia with episodes of nausea, vomiting,  with altered mental status.  Issues with his tube feeding.  Chest x-ray slightly worse right lower lobe.  Partially treated.  Rhinovirus PCR +10/22.  MRSA colonized.  PCR +10/26.  Blood cultures x 210/25 negative.  Positive swallowing study with aspiration.  Has a PEG tube.  Acute bacterial cystitis versus asymptomatic bacteriuria with urinalysis with 6-10 WBCs with Enterococcus.  Should be adequately treated with previous amoxicillin.  Anemia, chronic disease.  Ongoing.  Leukocytosis, neutrophilic on 10/24 15,000 likely related to above issues.  Resolved.  Blindness, affects all aspects of care.  Dementia, affects all aspects of care and patient cannot self-feed.    PLAN:    Diagnostically, continue to follow patient's physical exam, CBC, CMP, CRP.  Radiographs as needed.    Therapeutically, continue cefuroxime and doxycycline to continue until 11/4/2024 and reassess.  Supportive care.  Room air on 10/28/2024.    I discussed the patient's findings and my recommendations with patient and nursing staff    Thank you for asking me to see Omkar Nava.  Our group would be pleased to follow this patient over the course of their hospitalization and assist with outpatient antimicrobial therapy, as indicated.  Further recommendations depend on the results of the cultures and clinical course.  Side effects of medications discussed.  The patient is at increased risk for adverse drug reactions, complications of IV access, and readmission.  I will be out of town.  Partners are covering for questions.  See next week unless discharged.    Rayray Gordon MD  10/30/2024

## 2024-10-30 NOTE — PROGRESS NOTES
[facility] Neurology    Progress Note    Patient Name: Omkar Nava  : 1955  MRN: [id number]  Primary Care Physician:  Alberto Dye MD  Date of admission: 10/20/2024    Subjective     Chief Complaint: Altered mental status    History of Present Illness   Patient seen resting comfortably in bed.  No acute events overnight.  At neurologic baseline.    Review of Systems   General: Negative for fever, nausea, or vomiting.   Neurological: Negative for headache, pain, or weakness.     Objective     Physical Exam  Vitals and nursing note reviewed.   Constitutional:       General: He is not in acute distress.     Appearance: He is not ill-appearing.   Eyes:      Extraocular Movements: Extraocular movements intact.      Pupils: Pupils are equal, round, and reactive to light.      Comments: No nystagmus or deviated gaze noted   Neurological:      Mental Status: He is alert. Mental status is at baseline.      Cranial Nerves: Cranial nerves 2-12 are intact.      Sensory: Sensation is intact.      Motor: Weakness present. No tremor or seizure activity.      Coordination: Finger-Nose-Finger Test normal.      Comments:     Cranial Nerves   CN II: Pupils are equal, round, and reactive to light. Normal visual acuity and visual fields.    CN III IV VI: Extraocular movements are full without nystagmus.  CN V: Normal facial sensation and strength of muscles of mastication.  CN VII: Facial movements are symmetric. No weakness.  CN VIII:  Auditory acuity is normal.  CN IX & X:  Symmetric palatal movement.  CN XII: The tongue is midline.  No atrophy or fasciculations.    Motor: Generalized weakness          Vitals:   Temp:  [97.1 °F (36.2 °C)-97.6 °F (36.4 °C)] 97.6 °F (36.4 °C)  Heart Rate:  [42-53] 51  Resp:  [16-18] 16  BP: (135-163)/(58-84) 162/65    Current Medications    Current Facility-Administered Medications:     acetaminophen (TYLENOL) tablet 650 mg, 650 mg, Per PEG Tube, Q4H PRN **OR** acetaminophen  (TYLENOL) 160 MG/5ML oral solution 650 mg, 650 mg, Per PEG Tube, Q4H PRN **OR** acetaminophen (TYLENOL) suppository 650 mg, 650 mg, Rectal, Q4H PRN, Taryn Stover MD    albuterol (PROVENTIL) nebulizer solution 0.083% 2.5 mg/3mL, 2.5 mg, Nebulization, Q6H - RT, Taryn Stover MD, 2.5 mg at 10/30/24 0650    amLODIPine (NORVASC) tablet 10 mg, 10 mg, Per PEG Tube, Q24H, Taryn Stover MD, 10 mg at 10/29/24 0838    ARIPiprazole (ABILIFY) tablet 5 mg, 5 mg, Per G Tube, Daily, Taryn Stover MD, 5 mg at 10/29/24 0838    sennosides-docusate (PERICOLACE) 8.6-50 MG per tablet 2 tablet, 2 tablet, Per PEG Tube, BID PRN, 2 tablet at 10/27/24 1359 **AND** polyethylene glycol (MIRALAX) packet 17 g, 17 g, Per PEG Tube, Daily PRN **AND** [DISCONTINUED] bisacodyl (DULCOLAX) EC tablet 5 mg, 5 mg, Oral, Daily PRN **AND** bisacodyl (DULCOLAX) suppository 10 mg, 10 mg, Rectal, Daily PRN, Taryn Stover MD    brimonidine (ALPHAGAN) 0.2 % ophthalmic solution 1 drop, 1 drop, Both Eyes, TID, Taryn Stover MD, 1 drop at 10/29/24 2311    carvedilol (COREG) tablet 12.5 mg, 12.5 mg, Per PEG Tube, Q12H, Taryn Stover MD, 12.5 mg at 10/29/24 0838    cefuroxime (CEFTIN) tablet 500 mg, 500 mg, Per PEG Tube, Q12H, Ijeoma Ibrahim, [facility], 500 mg at 10/29/24 2309    cloNIDine (CATAPRES) tablet 0.2 mg, 0.2 mg, Per PEG Tube, Q8H, Taryn Stover MD, 0.2 mg at 10/29/24 1626    dextrose (D50W) (25 g/50 mL) IV injection 25 g, 25 g, Intravenous, Q15 Min PRN, Taryn Stover MD, 25 g at 10/21/24 0637    docusate sodium (COLACE) liquid 100 mg, 100 mg, Per G Tube, Daily, Farhana Lion MD, 100 mg at 10/29/24 0954    donepezil (ARICEPT) tablet 5 mg, 5 mg, Per PEG Tube, Nightly, Ijeoma Ibrahim RPH, 5 mg at 10/29/24 2307    doxycycline (MONODOX) capsule 100 mg, 100 mg, Per PEG Tube, Q12H, Ijeoma Ibrahim RPH, 100 mg at 10/29/24 2307    Enoxaparin Sodium (LOVENOX) syringe 40 mg, 40  mg, Subcutaneous, Daily, Taryn Stover MD, 40 mg at 10/29/24 0838    FLUoxetine (PROzac) 20 MG/5ML solution 20 mg, 20 mg, Per PEG Tube, Daily, Taryn Stover MD, 20 mg at 10/29/24 0840    glucagon (GLUCAGEN) injection 1 mg, 1 mg, Intramuscular, Q15 Min PRN, Taryn Stover MD    hydrALAZINE (APRESOLINE) tablet 100 mg, 100 mg, Per PEG Tube, Q8H, Taryn Stover MD, 100 mg at 10/30/24 0512    hydrOXYzine (ATARAX) tablet 25 mg, 25 mg, Per PEG Tube, TID PRN, Taryn Stover MD, 25 mg at 10/28/24 1256    Insulin Lispro (humaLOG) injection 2-7 Units, 2-7 Units, Subcutaneous, Q6H, Ryne Burgos DO, 3 Units at 10/30/24 0607    lansoprazole (PREVACID SOLUTAB) disintegrating tablet Tablet Delayed Release Dispersible 30 mg, 30 mg, Per PEG Tube, Q AM, Taryn Stover MD, 30 mg at 10/30/24 0512    Magnesium Standard Dose Replacement - Follow Nurse / BPA Driven Protocol, , Does not apply, PRN, Taryn Stover MD    nitroglycerin (NITROSTAT) SL tablet 0.4 mg, 0.4 mg, Sublingual, Q5 Min PRN, Taryn Stover MD    [DISCONTINUED] ondansetron ODT (ZOFRAN-ODT) disintegrating tablet 4 mg, 4 mg, Oral, Q6H PRN **OR** ondansetron (ZOFRAN) injection 4 mg, 4 mg, Intravenous, Q6H PRN, Taryn Stover MD, 4 mg at 10/27/24 1800    palliative care oral rinse, 5 mL, Mouth/Throat, 4x Daily, Taryn Stover MD, 5 mL at 10/29/24 2312    Phosphorus Replacement - Follow Nurse / BPA Driven Protocol, , Does not apply, PRN, Taryn Stover MD    polyethylene glycol (MIRALAX) packet 17 g, 17 g, Per G Tube, Daily, Farhana Lion MD, 17 g at 10/29/24 0954    Potassium Replacement - Follow Nurse / BPA Driven Protocol, , Does not apply, PRN, Taryn Stover MD    QUEtiapine (SEROquel) tablet 25 mg, 25 mg, Per PEG Tube, Nightly, Taryn Stover MD, 25 mg at 10/29/24 2306    sodium chloride 0.9 % flush 10 mL, 10 mL, Intravenous, PRN, Ck,  "MD Taryn    sodium chloride 0.9 % flush 10 mL, 10 mL, Intravenous, Q12H, Taryn Stover MD, 10 mL at 10/29/24 2313    sodium chloride 0.9 % flush 10 mL, 10 mL, Intravenous, PRN, Taryn Stover MD    terazosin (HYTRIN) capsule 5 mg, 5 mg, Per G Tube, Q12H, Taryn Stover MD, 5 mg at 10/29/24 0838    timolol (TIMOPTIC) 0.5 % ophthalmic solution 1 drop, 1 drop, Both Eyes, BID, Taryn Stover MD, 1 drop at 10/29/24 2311    Valproic Acid (DEPAKENE) syrup 150 mg, 150 mg, Nasogastric, Q8H, Taryn Stover MD, 150 mg at 10/30/24 0511    Laboratory Results:   Lab Results   Component Value Date    GLUCOSE 195 (H) 10/29/2024    CALCIUM 8.7 10/29/2024     10/29/2024    K 5.2 10/29/2024    CO2 26.0 10/29/2024     10/29/2024    BUN 25 (H) 10/29/2024    CREATININE 1.00 10/29/2024    EGFRIFAFRI >60 07/25/2022    EGFRIFNONA >60 07/25/2022    BCR 25.0 10/29/2024    ANIONGAP 8.0 10/29/2024     Lab Results   Component Value Date    WBC 9.63 10/29/2024    HGB 7.9 (L) 10/29/2024    HCT 25.2 (L) 10/29/2024    MCV 95.1 10/29/2024     10/29/2024     No results found for: \"CHOL\"  Lab Results   Component Value Date    HDL 44 06/25/2022     Lab Results   Component Value Date    .4 (H) 06/25/2022     Lab Results   Component Value Date    TRIG 32 04/01/2024    TRIG 106 03/25/2024    TRIG 58 06/25/2022     Lab Results   Component Value Date    HGBA1C 5.60 09/15/2024     Lab Results   Component Value Date    INR 1.05 03/29/2024    INR 1.38 (H) 03/12/2024    INR 1.08 10/18/2022    PROTIME 13.8 03/29/2024    PROTIME 17.1 (H) 03/12/2024    PROTIME 13.9 10/18/2022     Lab Results   Component Value Date    FOLATE 12.10 09/11/2024     Lab Results   Component Value Date    [id number] 1,158 (H) 09/11/2024             Assessment / Plan   Brief Patient Summary:  Omkar Nava is a 69 y.o. male with a past medical history of HTN, T2DM, CKD, vascular dementia, dysphagia, blindness and " chronic iron deficiency anemia who resides in a skilled nursing facility who presented to [facility] ED after vomiting for 1 day.  Patient's hospital course continues to be a complicated with after mental status and agitation.     Plan:   Dementia with agitation  Encephalopathy secondary to infection-improving  Continue Depakote 150 mg 3 times daily  VPA level uptrending over 8.7 -> 28.7, nontoxic  ID following appreciate recommendations  Continue home Abilify 5 mg daily  Continue Prozac 20 mg daily  Continue home Seroquel 25 mg nightly  Continue Aricept 5 mg daily  QTc 408  Patient to continue to work with PT/OT/ST  Patient to follow up with Dr. Esaley 12/02/24.   Patient is okay to discharge from a neurologic standpoint.    I have discussed the above with the patient, bedside RN Dr. Lion  Time spent with patient: 35 minutes in face-to-face evaluation and management of the patient.    Copied text in this note has been reviewed and is accurate as of 10/30/24.     NARGIS Sheridan

## 2024-10-30 NOTE — DISCHARGE SUMMARY
[facility] Medicine Services  DISCHARGE SUMMARY    Patient Name: Omkar Nava  : 1955  MRN: [id number]    Date of Admission: 10/20/2024  6:23 PM  Date of Discharge:  10/31/2024  Primary Care Physician: Alberto Dye MD    Consults       Date and Time Order Name Status Description    10/28/2024  1:31 PM Inpatient Neurology Consult General Completed     10/28/2024  8:29 AM Inpatient Infectious Diseases Consult Completed             Hospital Course       Active Hospital Problems    Diagnosis  POA    Severe vascular dementia without behavioral disturbance, psychotic disturbance, mood disturbance, or anxiety [F01.C0]  Yes    Protein calorie malnutrition [E46]  Yes    S/P transmetatarsal amputation of foot, left [Z89.432]  Not Applicable    Anemia of chronic disease [D63.8]  Yes    Neurocognitive disorder [R41.9]  Yes    Type 2 diabetes mellitus [E11.9]  Yes    Essential hypertension [I10]  Yes    Hypertensive heart and chronic kidney disease 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 failure, with stage 1 through stage 4 chronic kidney disease, or unspecified chronic kidney disease [I13.10]  Yes      Resolved Hospital Problems    Diagnosis Date Resolved POA    **UTI (urinary tract infection) [N39.0] 10/30/2024 Yes    Hypothermia [T68.XXXA] 10/30/2024 Yes    Acute encephalopathy [G93.40] 10/30/2024 Yes    Vomiting [R11.10] 10/30/2024 Yes    Long term (current) use of insulin [Z79.4] 10/30/2024 Not Applicable          Hospital Course:  Omkar Nava is a 69 y.o. male with a PMHx of hypertension, diabetes, chronic kidney disease, vascular dementia, dysphagia, and chronic iron def anemia admitted with increased vomiting, with concern for possible underlying infection. Found to be positive for Rhinovirus and UTI.  The patient initially on amoxicillin for UTI.  Overnight on 10/25 through 10/26 patient became hypothermic with a T minimum of 92 degrees, requiring external warming.  Chest x-ray revealed bibasilar  opacities. Antibiotics broadened on 10/26.      Postviral pneumonia versus HAP  - On 10/25, patient became hypothermic requiring external warming (Tmin 92)  - Chest x-ray revealing bibasilar opacities, though decreased from previous chest xrays  - MRSA nares positive  - Transitioned from Vanc and Zosyn to doxycycline and Ceftin until 11/04/2024. Given complex hospitalization, appreciate ID recommendations   - bld cx 10/26: NGTD  --improved on RA      Acute encephalopathy likely secondary to infection  Initial UTI/viral pneumonia  - UA with 4+ bacteria and urine cultures growing Enterococcus faecalis.    - Respiratory panel positive for rhinovirus  - Abx per above      Hyperkalemia, resolved  - s/p lokelma xs 2      JAYY on CKD-stage 2, resolved   - Baseline creatinine near 1.1-1.2  - Cr near baseline  - Status post IVF     Chronic aspiration  -Repeat barium swallow 10/29: Purée, thin liquids with long-term return of methods. Continue with PEG and tube feeds as well      Chronic anemia  - Appears near baseline, likely trending down due to acute illness in setting of chronic anemia. In addition to chronic malnutrition.   --CBC in 2-3 days      HFpEF  HTN  - Echo with EF of 56 to 60%  - Continue carvedilol, clonidine, terazosin, hydralazine  - cont to hold diuretics     T2DM  - A1c 5.6 last hospitalization  - Tolerating tube feeds, per records, was on Levemir 6 U BID on last admission. At this time, has not needed much insulin. On discharge, will cont on Metformin         Dementia with agitation  Questionable seizure disorder  - Seroquel, Depakote, Abilify, prozac    Discharge Follow Up Recommendations for outpatient labs/diagnostics:   Patient to follow up with Dr. Easley 12/02/24.   Follow-up with PCP in 1 week  Follow-up with infectious disease in 1 week.  CBC in 2-3 days to monitor H/H     Day of Discharge     HPI:   Resting in bed, having some mild nausea after eating breakfast. No emesis. No abdominal pain. BM  yesterday. RN at bedside. Patient without any requests. No f/c, v/d, soa or cp       Vital Signs:   Temp:  [97.4 °F (36.3 °C)-97.8 °F (36.6 °C)] 97.8 °F (36.6 °C)  Heart Rate:  [42-51] 50  Resp:  [16-18] 16  BP: (135-162)/() 149/123      Physical Exam:  Constitutional: Chronically ill appearing male, no acute distress, awake, alert  HENT: NCAT, mucous membranes moist  Respiratory: Clear to auscultation bilaterally, respiratory effort normal   Cardiovascular: RRR, no murmurs, rubs, or gallops  Gastrointestinal: Positive bowel sounds, soft, nontender, nondistended; PEG site CDI  Musculoskeletal: No bilateral ankle edema  Psychiatric: flat affect, will follow commands, moves all extremities   Neurologic: Oriented x 2, speech clear  Skin: No rashes    Pertinent  and/or Most Recent Results     LAB RESULTS:      Lab 10/30/24  1050 10/29/24  0842 10/28/24  0922 10/27/24  0850 10/26/24  1206 10/25/24  2219 10/25/24  0943 10/24/24  0915   WBC  --  9.63 10.38 7.53  --  9.12 10.34 15.34*   HEMOGLOBIN 7.7* 7.9* 8.2* 8.0*  --  8.1* 7.8* 8.6*   HEMATOCRIT 23.9* 25.2* 25.0* 24.1*  --  24.4* 23.8* 26.0*   PLATELETS  --  217 245 209  --  196 178 206   NEUTROS ABS  --  6.93  --   --   --  7.26* 7.95* 12.42*   IMMATURE GRANS (ABS)  --  0.18*  --   --   --  0.07* 0.09* 0.11*   LYMPHS ABS  --  1.43  --   --   --  1.06 1.35 1.50   MONOS ABS  --  0.93*  --   --   --  0.59 0.75 1.13*   EOS ABS  --  0.13  --   --   --  0.12 0.18 0.16   MCV  --  95.1 93.6 93.8  --  93.1 92.2 93.5   CRP  --  1.33*  --   --   --   --   --   --    PROCALCITONIN  --   --  0.12 0.14 0.16  --   --   --    LACTATE  --   --   --   --   --  1.0  --   --          Lab 10/30/24  1050 10/29/24  0842 10/28/24  1304 10/28/24  0922 10/27/24  0850 10/25/24  0943   SODIUM 135* 138  --  136 138 140   POTASSIUM 5.2 5.2 5.4* 5.5* 4.5 4.4   CHLORIDE 103 104  --  102 102 102   CO2 23.0 26.0  --  24.0 27.0 28.0   ANION GAP 9.0 8.0  --  10.0 9.0 10.0   BUN 23 25*  --  28* 29*  34*   CREATININE 0.74* 1.00  --  1.10 1.03 0.94   EGFR 99.3 82.5  --  73.6 79.6 88.9   GLUCOSE 236* 195*  --  186* 101* 171*   CALCIUM 9.0 8.7  --  8.4* 8.7 8.7         Lab 10/29/24  0842   TOTAL PROTEIN 6.0   ALBUMIN 3.3*   GLOBULIN 2.7   ALT (SGPT) 21   AST (SGOT) 15   BILIRUBIN <0.2   ALK PHOS 100                     Brief Urine Lab Results  (Last result in the past 365 days)        Color   Clarity   Blood   Leuk Est   Nitrite   Protein   CREAT   Urine HCG        10/20/24 2123 Yellow   Clear   Negative   Negative   Negative   100 mg/dL (2+)                 Microbiology Results (last 10 days)       Procedure Component Value - Date/Time    MRSA Screen, PCR (Inpatient) - Swab, Nares [[id number]]  (Abnormal) Collected: 10/26/24 1208    Lab Status: Final result Specimen: Swab from Nares Updated: 10/26/24 1408     MRSA PCR Positive    Narrative:      The negative predictive value of this diagnostic test is high and should only be used to consider de-escalating anti-MRSA therapy. A positive result may indicate colonization with MRSA and must be correlated clinically.    Blood Culture - Blood, Hand, Left [[id number]]  (Normal) Collected: 10/25/24 2000    Lab Status: Preliminary result Specimen: Blood from Hand, Left Updated: 10/30/24 0015     Blood Culture No growth at 4 days    Narrative:      Aerobic Bottle Only    Less than seven (7) mL's of blood was collected.  Insufficient quantity may yield false negative results.    Blood Culture - Blood, Hand, Right [[id number]]  (Normal) Collected: 10/25/24 2000    Lab Status: Preliminary result Specimen: Blood from Hand, Right Updated: 10/30/24 0015     Blood Culture No growth at 4 days    Narrative:      Aerobic Bottle Only      Respiratory Panel PCR w/COVID-19(SARS-CoV-2) GIANNI/[facility]/ANNA/PAD/COR/ASHIA In-House, NP Swab in UTM/VTM, 2 HR TAT - Swab, Nasopharynx [[id number]]  (Abnormal) Collected: 10/22/24 1112    Lab Status: Final result Specimen: Swab from Nasopharynx Updated: 10/22/24  1228     ADENOVIRUS, PCR Not Detected     Coronavirus 229E Not Detected     Coronavirus HKU1 Not Detected     Coronavirus NL63 Not Detected     Coronavirus OC43 Not Detected     COVID19 Not Detected     Human Metapneumovirus Not Detected     Human Rhinovirus/Enterovirus Detected     Influenza A PCR Not Detected     Influenza B PCR Not Detected     Parainfluenza Virus 1 Not Detected     Parainfluenza Virus 2 Not Detected     Parainfluenza Virus 3 Not Detected     Parainfluenza Virus 4 Not Detected     RSV, PCR Not Detected     Bordetella pertussis pcr Not Detected     Bordetella parapertussis PCR Not Detected     Chlamydophila pneumoniae PCR Not Detected     Mycoplasma pneumo by PCR Not Detected    Narrative:      In the setting of a positive respiratory panel with a viral infection PLUS a negative procalcitonin without other underlying concern for bacterial infection, consider observing off antibiotics or discontinuation of antibiotics and continue supportive care. If the respiratory panel is positive for atypical bacterial infection (Bordetella pertussis, Chlamydophila pneumoniae, or Mycoplasma pneumoniae), consider antibiotic de-escalation to target atypical bacterial infection.    Blood Culture - Blood, Arm, Right [[id number]]  (Normal) Collected: 10/21/24 0107    Lab Status: Final result Specimen: Blood from Arm, Right Updated: 10/26/24 0215     Blood Culture No growth at 5 days    Blood Culture - Blood, Hand, Right [[id number]]  (Normal) Collected: 10/21/24 0107    Lab Status: Final result Specimen: Blood from Hand, Right Updated: 10/26/24 0215     Blood Culture No growth at 5 days    Urine Culture - Urine, Straight Cath [[id number]]  (Abnormal)  (Susceptibility) Collected: 10/20/24 2123    Lab Status: Final result Specimen: Urine from Straight Cath Updated: 10/23/24 0902     Urine Culture >100,000 CFU/mL Enterococcus faecalis    Narrative:      Colonization of the urinary tract without infection is common.  Treatment is discouraged unless the patient is symptomatic, pregnant, or undergoing an invasive urologic procedure.    Susceptibility        Enterococcus faecalis      KB      Ampicillin Susceptible      Levofloxacin Susceptible      Nitrofurantoin Susceptible      Vancomycin Susceptible                                   FL Video Swallow With Speech Single Contrast    Result Date: 10/29/2024  FL VIDEO SWALLOW W SPEECH SINGLE-CONTRAST Date of Exam: 10/29/2024 1:54 PM EDT Indication: dysphagia.   Comparison: None available. Technique:   The speech pathologist administered food and/or liquid mixed with barium to the patient with cine/video imaging.  Imaging assistance was provided to the speech pathologist and an image was saved. Fluoroscopic Time: 1 minute and 54 seconds Number of Images: 6 associated fluoroscopic loops were saved Findings: Penetration of barium was seen during fluoroscopic guided modified barium swallowing series. Please see speech therapy report for full details and recommendations.     Impression: Fluoroscopy provided for a modified barium swallow. Penetration of barium was seen during swallowing evaluation. Please see speech therapy report for full details and recommendations. Report dictated by: Maggi Silverman PA-c  I have personally reviewed this case and agree with the findings above: Electronically Signed: Anjum Prado MD  10/29/2024 3:00 PM EDT  Workstation ID: [id number]    XR Chest 1 View    Result Date: 10/26/2024  XR CHEST 1 VW Date of Exam: 10/26/2024 8:18 AM EDT Indication: Hypothermic overnight, infectious workup Comparison: CT chest 10/20/2024, AP chest x-ray 3/31/2024 Findings: The patient is rotated. Airspace opacities appear decreased compared to most recent chest x-ray from 3/31/2024, but there are bibasilar airspace opacities, right greater than left. No pneumothorax or large pleural effusion is seen. Cardiomediastinal contours are stable.     Impression: Bibasilar airspace  opacities, right greater than left, which may be due to atelectasis and/or pneumonia. Electronically Signed: Agnes Lourdes  10/26/2024 9:41 AM EDT  Workstation ID: [id number]    FL Video Swallow With Speech Single Contrast    Result Date: 10/23/2024  FL VIDEO SWALLOW W SPEECH SINGLE-CONTRAST Date of Exam: 10/23/2024 1:31 PM EDT Indication: dysphagia.   Comparison: None available. Technique:   The speech pathologist administered food and/or liquid mixed with barium to the patient with cine/video imaging.  Imaging assistance was provided to the speech pathologist and an image was saved. Fluoroscopic Time: 48 seconds Number of Images: 6 associated fluoroscopic loops were saved Findings: Aspiration was seen during fluoroscopic guided modified barium swallowing series. Please see speech therapy report for full details and recommendations.     Impression: Fluoroscopy provided for a modified barium swallow. Aspiration was seen during swallowing evaluation. Please see speech therapy report for full details and recommendations. Report dictated by: Maggi Silverman PA-c  I have personally reviewed this case and agree with the findings above: Electronically Signed: Wisam Oquendo MD  10/23/2024 5:02 PM EDT  Workstation ID: [id number]    CT Chest Without Contrast Diagnostic    Result Date: 10/20/2024  CT CHEST WO CONTRAST DIAGNOSTIC Date of Exam: 10/20/2024 9:40 PM EDT Indication: abdominal pain. Comparison: 9/11/2024. Technique: Axial CT images were obtained of the chest without contrast administration.  Reconstructed coronal and sagittal images were also obtained. Automated exposure control and iterative construction methods were used. Findings: There is a subcentimeter low-density nodule in the right thyroid lobe, which is unchanged. There are calcifications in the left thyroid lobe. There are no specific follow-up recommendations. The trachea, esophagus appear within normal limits. The heart is mildly enlarged. There are mild coronary  artery calcifications. Small pericardial effusion is present. Mild aortic atherosclerosis. No mediastinal lymphadenopathy. There are persistent findings of interlobular septal thickening in the lung bases dependently along with bibasilar dependent atelectasis. These changes could be due to hypoinflation, mild interstitial pulmonary edema or atypical pneumonia. The airways are patent. No suspicious lung nodules. No acute findings in the superficial soft tissues. Findings in the abdomen discussed in a separate report. No acute osseous abnormality or destructive bone lesion. There are mild lower cervical and diffuse thoracic degenerative changes.     Impression: 1.Persistent findings of interlobular septal thickening in the lung bases with bibasilar dependent atelectasis. This could be due to hypoinflation, mild interstitial pulmonary edema or atypical pneumonia. 2.Mild cardiomegaly and small pericardial effusion. 3.Coronary artery disease. Electronically Signed: Rayray Mcclain MD  10/20/2024 10:44 PM EDT  Workstation ID: [id number]    CT Abdomen Pelvis Without Contrast    Result Date: 10/20/2024  CT ABDOMEN PELVIS WO CONTRAST Date of Exam: 10/20/2024 9:40 PM EDT Indication: abdominal pain. Comparison: 9/11/2024. Technique: Axial CT images were obtained of the abdomen and pelvis without the administration of contrast. Reconstructed coronal and sagittal images were also obtained. Automated exposure control and iterative construction methods were used. Findings: There is stable interstitial edema in the lung bases with dependent bibasilar atelectasis. The heart is enlarged. There is diminished attenuation of the blood pool suggesting anemia. Coronary artery calcifications are partially seen. There is a percutaneous gastrostomy tube with tip in the stomach. No acute findings in the superficial soft tissues. No acute osseous abnormality or destructive bone lesion. There is 6 mm anterior spondylolisthesis of L4 on L5 and  there is severe facet arthritis at  L4-L5 along with moderate disc degeneration. There are mild degenerative changes at the remainder of the lumbar segments. Mild DJD at both hips. The liver, gallbladder, bile ducts, pancreas, stomach, duodenum and spleen appear within normal limits. No adrenal mass. Kidneys are homogeneous. No hydronephrosis. No urolithiasis. Prostate gland is normal size. There is wall thickening throughout the urinary bladder, which appears more than expected from under distention. Correlate for cystitis. The appendix is normal. There is distal colonic diverticulosis. No small bowel distention. Mild aortoiliac atherosclerosis. No aneurysm. No ascites, pneumoperitoneum or lymphadenopathy.     Impression: 1.Wall thickening of the urinary bladder, which appears more than expected from under distention. Correlate for cystitis. 2.Cardiomegaly, coronary artery disease and mild interstitial edema in the lung bases. 3.Percutaneous gastrostomy tube in place. 4.Colonic diverticulosis. 5.Lumbar degenerative changes with grade 1 anterior spondylolisthesis of L4 on L5. Electronically Signed: Rayray Mcclain MD  10/20/2024 10:42 PM EDT  Workstation ID: [id number]             Results for orders placed during the hospital encounter of 01/15/24    Adult Transthoracic Echo Complete With Contrast if Necessary Per Protocol    Interpretation Summary    Left ventricular ejection fraction appears to be 56 - 60%.    Left ventricular wall thickness is consistent with hypertrophy.    Estimated right ventricular systolic pressure from tricuspid regurgitation is mildly elevated (35-45 mmHg).    There is a small (1-2cm) pericardial effusion.    No evidence for pericardial tamponade        Discharge Details        Discharge Medications        New Medications        Instructions Start Date   cefuroxime 500 MG tablet  Commonly known as: CEFTIN   500 mg, Oral, Every 12 Hours Scheduled      doxycycline 100 MG capsule  Commonly known  as: MONODOX   100 mg, Oral, Every 12 Hours Scheduled      polyethylene glycol 17 g packet  Commonly known as: MIRALAX   17 g, Oral, Daily   Start Date: October 31, 2024            Changes to Medications        Instructions Start Date   lansoprazole 30 MG Tablet Delayed Release Dispersible disintegrating tablet  Commonly known as: PREVACID SOLUTAB  What changed: how to take this   30 mg, Nasogastric, Every Early Morning             Continue These Medications        Instructions Start Date   acetaminophen 160 MG/5ML solution  Commonly known as: TYLENOL   650 mg, Per G Tube, Every 6 Hours PRN      amLODIPine 10 MG tablet  Commonly known as: NORVASC   10 mg, Per G Tube, Every 24 Hours Scheduled      ARIPiprazole 5 MG tablet  Commonly known as: ABILIFY   5 mg, Per G Tube, Daily      brimonidine 0.2 % ophthalmic solution  Commonly known as: ALPHAGAN   1 drop, Both Eyes, 3 Times Daily      carvedilol 12.5 MG tablet  Commonly known as: COREG   12.5 mg, Per G Tube, Every 12 Hours Scheduled      cloNIDine 0.2 MG tablet  Commonly known as: CATAPRES   0.2 mg, Per G Tube, Every 8 Hours Scheduled      FLUoxetine 20 MG/5ML solution  Commonly known as: PROzac   20 mg, Oral, Daily      hydrALAZINE 100 MG tablet  Commonly known as: APRESOLINE   100 mg, Per G Tube, Every 8 Hours Scheduled      hydrOXYzine 25 MG tablet  Commonly known as: ATARAX   25 mg, Per G Tube, 3 Times Daily PRN      ipratropium-albuterol 0.5-2.5 mg/3 ml nebulizer  Commonly known as: DUO-NEB   3 mL, Nebulization, Every 4 Hours PRN      metFORMIN 500 MG tablet  Commonly known as: GLUCOPHAGE   500 mg, 2 Times Daily With Meals      PALLIATIVE CARE ORAL RINSE ([facility])   5 mL, Mouth/Throat, 4 Times Daily      QUEtiapine 25 MG tablet  Commonly known as: SEROquel   25 mg, Nightly      sennosides 8.8 MG/5ML syrup  Commonly known as: SENOKOT   10 mL, Per G Tube, Nightly      terazosin 5 MG capsule  Commonly known as: HYTRIN   5 mg, Per G Tube, Every 12 Hours Scheduled       timolol 0.5 % ophthalmic solution  Commonly known as: TIMOPTIC   1 drop, Both Eyes, 2 Times Daily      torsemide 5 MG tablet  Commonly known as: DEMADEX   5 mg, Per G Tube, Daily      Valproic Acid 250 MG/5ML solution syrup  Commonly known as: DEPAKENE   150 mg, Nasogastric, Every 8 Hours Scheduled      Vitamin D3 50 MCG (2000 UT) tablet   Administer 1 tablet per G tube Daily.             Stop These Medications      Pro[employer] No Carb liquid              No Known Allergies      Discharge Disposition:  Rehab Facility or Unit (DC - External)    Diet:  Hospital:  Diet Order   Procedures    Diet: Cardiac, Diabetic; Healthy Heart (2-3 Na+); Consistent Carbohydrate; No Straw, Feeding Assistance - Nursing; Texture: Pureed (NDD 1); Fluid Consistency: Thin (IDDSI 0)               CODE STATUS:    Code Status and Medical Interventions: CPR (Attempt to Resuscitate); Full Support   Ordered at: 10/21/24 0146     Level Of Support Discussed With:    Health Care Surrogate     Code Status (Patient has no pulse and is not breathing):    CPR (Attempt to Resuscitate)     Medical Interventions (Patient has pulse or is breathing):    Full Support       Future Appointments   Date Time Provider Department Center   10/30/2024  6:45 PM MED 8  [facility]   12/2/2024  9:45 AM Tommy Mcpherson MD [facility]   12/2/2024  1:45 PM Farzana Easley MD E [facility]                 Farhana Lion MD  10/30/24      Time Spent on Discharge:  I spent  40 minutes on this discharge activity which included: face-to-face encounter with the patient, reviewing the data in the system, coordination of the care with the nursing staff as well as consultants, documentation, and entering orders.

## 2024-10-31 VITALS
DIASTOLIC BLOOD PRESSURE: 69 MMHG | OXYGEN SATURATION: 94 % | BODY MASS INDEX: 25.73 KG/M2 | TEMPERATURE: 97.1 F | HEIGHT: 68 IN | HEART RATE: 50 BPM | RESPIRATION RATE: 16 BRPM | WEIGHT: 169.8 LBS | SYSTOLIC BLOOD PRESSURE: 140 MMHG

## 2024-10-31 LAB
ANION GAP SERPL CALCULATED.3IONS-SCNC: 10 MMOL/L (ref 5–15)
BACTERIA SPEC AEROBE CULT: NORMAL
BACTERIA SPEC AEROBE CULT: NORMAL
BUN SERPL-MCNC: 29 MG/DL (ref 8–23)
BUN/CREAT SERPL: 39.2 (ref 7–25)
CALCIUM SPEC-SCNC: 8.8 MG/DL (ref 8.6–10.5)
CHLORIDE SERPL-SCNC: 101 MMOL/L (ref 98–107)
CO2 SERPL-SCNC: 24 MMOL/L (ref 22–29)
CREAT SERPL-MCNC: 0.74 MG/DL (ref 0.76–1.27)
EGFRCR SERPLBLD CKD-EPI 2021: 99.3 ML/MIN/1.73
GLUCOSE BLDC GLUCOMTR-MCNC: 187 MG/DL (ref 70–130)
GLUCOSE BLDC GLUCOMTR-MCNC: 205 MG/DL (ref 70–130)
GLUCOSE BLDC GLUCOMTR-MCNC: 205 MG/DL (ref 70–130)
GLUCOSE SERPL-MCNC: 149 MG/DL (ref 65–99)
HCT VFR BLD AUTO: 21.5 % (ref 37.5–51)
HGB BLD-MCNC: 7.1 G/DL (ref 13–17.7)
POTASSIUM SERPL-SCNC: 5.3 MMOL/L (ref 3.5–5.2)
SODIUM SERPL-SCNC: 135 MMOL/L (ref 136–145)

## 2024-10-31 PROCEDURE — 94664 DEMO&/EVAL PT USE INHALER: CPT

## 2024-10-31 PROCEDURE — 25010000002 ENOXAPARIN PER 10 MG: Performed by: PEDIATRICS

## 2024-10-31 PROCEDURE — 94761 N-INVAS EAR/PLS OXIMETRY MLT: CPT

## 2024-10-31 PROCEDURE — 80048 BASIC METABOLIC PNL TOTAL CA: CPT | Performed by: FAMILY MEDICINE

## 2024-10-31 PROCEDURE — 94799 UNLISTED PULMONARY SVC/PX: CPT

## 2024-10-31 PROCEDURE — 82948 REAGENT STRIP/BLOOD GLUCOSE: CPT

## 2024-10-31 PROCEDURE — 63710000001 INSULIN LISPRO (HUMAN) PER 5 UNITS: Performed by: STUDENT IN AN ORGANIZED HEALTH CARE EDUCATION/TRAINING PROGRAM

## 2024-10-31 PROCEDURE — 85014 HEMATOCRIT: CPT | Performed by: FAMILY MEDICINE

## 2024-10-31 PROCEDURE — 85018 HEMOGLOBIN: CPT | Performed by: FAMILY MEDICINE

## 2024-10-31 PROCEDURE — 25010000002 ONDANSETRON PER 1 MG: Performed by: PEDIATRICS

## 2024-10-31 RX ADMIN — POLYETHYLENE GLYCOL 3350 17 G: 17 POWDER, FOR SOLUTION ORAL at 10:00

## 2024-10-31 RX ADMIN — INSULIN LISPRO 2 UNITS: 100 INJECTION, SOLUTION INTRAVENOUS; SUBCUTANEOUS at 12:16

## 2024-10-31 RX ADMIN — Medication 10 ML: at 10:01

## 2024-10-31 RX ADMIN — ALBUTEROL SULFATE 2.5 MG: 2.5 SOLUTION RESPIRATORY (INHALATION) at 12:18

## 2024-10-31 RX ADMIN — TIMOLOL MALEATE 1 DROP: 5 SOLUTION/ DROPS OPHTHALMIC at 00:39

## 2024-10-31 RX ADMIN — VALPROIC ACID 150 MG: 250 SOLUTION ORAL at 05:33

## 2024-10-31 RX ADMIN — INSULIN LISPRO 3 UNITS: 100 INJECTION, SOLUTION INTRAVENOUS; SUBCUTANEOUS at 00:50

## 2024-10-31 RX ADMIN — DOCUSATE SODIUM 100 MG: 50 LIQUID ORAL at 10:00

## 2024-10-31 RX ADMIN — ENOXAPARIN SODIUM 40 MG: 100 INJECTION SUBCUTANEOUS at 11:00

## 2024-10-31 RX ADMIN — ARIPIPRAZOLE 5 MG: 5 TABLET ORAL at 10:01

## 2024-10-31 RX ADMIN — INSULIN LISPRO 3 UNITS: 100 INJECTION, SOLUTION INTRAVENOUS; SUBCUTANEOUS at 06:24

## 2024-10-31 RX ADMIN — ONDANSETRON 4 MG: 2 INJECTION INTRAMUSCULAR; INTRAVENOUS at 10:45

## 2024-10-31 RX ADMIN — HYDRALAZINE HYDROCHLORIDE 100 MG: 50 TABLET ORAL at 05:34

## 2024-10-31 RX ADMIN — CEFUROXIME AXETIL 500 MG: 250 TABLET, FILM COATED ORAL at 10:00

## 2024-10-31 RX ADMIN — MINERAL OIL 5 ML: 1000 LIQUID ORAL at 12:16

## 2024-10-31 RX ADMIN — MINERAL OIL 5 ML: 1000 LIQUID ORAL at 09:59

## 2024-10-31 RX ADMIN — CARVEDILOL 12.5 MG: 6.25 TABLET, FILM COATED ORAL at 10:00

## 2024-10-31 RX ADMIN — FLUOXETINE HYDROCHLORIDE 20 MG: 20 SOLUTION ORAL at 09:59

## 2024-10-31 RX ADMIN — ALBUTEROL SULFATE 2.5 MG: 2.5 SOLUTION RESPIRATORY (INHALATION) at 07:41

## 2024-10-31 RX ADMIN — TERAZOSIN HYDROCHLORIDE 5 MG: 5 CAPSULE ORAL at 11:00

## 2024-10-31 RX ADMIN — DOXYCYCLINE 100 MG: 100 CAPSULE ORAL at 10:00

## 2024-10-31 RX ADMIN — TIMOLOL MALEATE 1 DROP: 5 SOLUTION/ DROPS OPHTHALMIC at 09:59

## 2024-10-31 RX ADMIN — LANSOPRAZOLE 30 MG: 15 TABLET, ORALLY DISINTEGRATING ORAL at 05:33

## 2024-10-31 RX ADMIN — BRIMONIDINE TARTRATE 1 DROP: 2 SOLUTION/ DROPS OPHTHALMIC at 09:59

## 2024-10-31 RX ADMIN — AMLODIPINE BESYLATE 10 MG: 10 TABLET ORAL at 10:00

## 2024-10-31 NOTE — CASE MANAGEMENT/SOCIAL WORK
Case Management Discharge Note      Final Note: Mr. Nava has [facility] transport scheduled for 1:00pm today to return to [facility] if medically ready. Updated Sheila with [facility] and Daughter Idalia. Idalia is agreeable with discharge plan.         Selected Continued Care - Admitted Since 10/20/2024       Destination Coordination complete.      Service Provider Services Address Phone Fax Patient Preferred    [facility] [address] , [facility] [address] [phone] --                          Transportation Services  Ambulance: [facility]    Final Discharge Disposition Code: 03 - skilled nursing facility (SNF)

## 2024-11-03 LAB
QT INTERVAL: 478 MS
QTC INTERVAL: 408 MS

## 2024-11-04 NOTE — PROGRESS NOTES
"Enter Query Response Below      Query Response: - Malnutrition was not supported     Electronically signed by Taryn Stover MD, 24, 9:36 AM EST.               If applicable, please update the problem list.     Patient: Okmar Nava        : 1955  Account: [id number]           Admit Date:         How to Respond to this query:       a. Click New Note     b. Answer query within the yellow box.                c. Update the Problem List, if applicable.      If you have any questions about this query contact me at: [email or web address]     Dr. Stover,     Patient with history of dysphagia requiring tube feeds presented 10/20 for abdominal pain and vomiting. H&P notes poor intake, describes patient as having \"thin extremities,\" and includes Protein calorie malnutrition in the Assessment & Plan section. Registered dietitian evaluated patient on 10/21, noting \"Patient does not meet malnutrition criteria at this time.\" Patient treated with monitoring and tube feed adjustments.     There is no documented weight loss, loss of muscle mass or fat, fluid accumulation or diminished functional status.    Please clarify diagnosis treated/monitored:     - Malnutrition was supported with additional clinical indicators (please specify severity if possible) ___________________  - Malnutrition was not supported  - Other ___________________  - Unable to clinically determine    ASPEN criteria for malnutrition requires the presence of at least two of the following: insufficient energy intake, weight loss, loss of muscle mass, loss of subcutaneous fat, localized or generalized fluid accumulation, diminished functional status (measured by calibrated hand  strength).    By submitting this query, we are merely seeking further clarification of documentation to accurately reflect all conditions that you are monitoring, evaluating, treating or that extend the hospitalization or utilize additional resources of " care. Please utilize your independent clinical judgment when addressing the question(s) above.     This query and your response, once completed, will be entered into the legal medical record.    Sincerely,  Everardo Crook RN, CDS  Clinical Documentation Integrity Program

## 2024-11-07 ENCOUNTER — APPOINTMENT (OUTPATIENT)
Dept: GENERAL RADIOLOGY | Facility: HOSPITAL | Age: 69
End: 2024-11-07
Payer: MEDICARE

## 2024-11-07 ENCOUNTER — HOSPITAL ENCOUNTER (INPATIENT)
Facility: HOSPITAL | Age: 69
LOS: 7 days | Discharge: NURSING, REHAB OR LONG-TERM CARE | End: 2024-11-15
Attending: EMERGENCY MEDICINE | Admitting: STUDENT IN AN ORGANIZED HEALTH CARE EDUCATION/TRAINING PROGRAM
Payer: MEDICARE

## 2024-11-07 ENCOUNTER — APPOINTMENT (OUTPATIENT)
Dept: CT IMAGING | Facility: HOSPITAL | Age: 69
End: 2024-11-07
Payer: MEDICARE

## 2024-11-07 DIAGNOSIS — E87.5 HYPERKALEMIA: ICD-10-CM

## 2024-11-07 DIAGNOSIS — F01.50 VASCULAR DEMENTIA, UNSPECIFIED DEMENTIA SEVERITY, UNSPECIFIED WHETHER BEHAVIORAL, PSYCHOTIC, OR MOOD DISTURBANCE OR ANXIETY: ICD-10-CM

## 2024-11-07 DIAGNOSIS — R13.12 OROPHARYNGEAL DYSPHAGIA: ICD-10-CM

## 2024-11-07 DIAGNOSIS — N17.9 ACUTE KIDNEY INJURY: Primary | ICD-10-CM

## 2024-11-07 DIAGNOSIS — Z86.39 HISTORY OF DIABETES MELLITUS: ICD-10-CM

## 2024-11-07 DIAGNOSIS — R53.1 GENERALIZED WEAKNESS: ICD-10-CM

## 2024-11-07 LAB
ALBUMIN SERPL-MCNC: 3.8 G/DL (ref 3.5–5.2)
ALBUMIN/GLOB SERPL: 1.1 G/DL
ALP SERPL-CCNC: 85 U/L (ref 39–117)
ALT SERPL W P-5'-P-CCNC: 20 U/L (ref 1–41)
ANION GAP SERPL CALCULATED.3IONS-SCNC: 9 MMOL/L (ref 5–15)
AST SERPL-CCNC: 14 U/L (ref 1–40)
BASOPHILS # BLD AUTO: 0.02 10*3/MM3 (ref 0–0.2)
BASOPHILS NFR BLD AUTO: 0.3 % (ref 0–1.5)
BILIRUB SERPL-MCNC: <0.2 MG/DL (ref 0–1.2)
BILIRUB UR QL STRIP: NEGATIVE
BUN SERPL-MCNC: 42 MG/DL (ref 8–23)
BUN/CREAT SERPL: 30.2 (ref 7–25)
CALCIUM SPEC-SCNC: 9.9 MG/DL (ref 8.6–10.5)
CHLORIDE SERPL-SCNC: 98 MMOL/L (ref 98–107)
CLARITY UR: CLEAR
CO2 SERPL-SCNC: 26 MMOL/L (ref 22–29)
COLOR UR: YELLOW
CREAT SERPL-MCNC: 1.39 MG/DL (ref 0.76–1.27)
DEPRECATED RDW RBC AUTO: 51.7 FL (ref 37–54)
EGFRCR SERPLBLD CKD-EPI 2021: 55.6 ML/MIN/1.73
EOSINOPHIL # BLD AUTO: 0.12 10*3/MM3 (ref 0–0.4)
EOSINOPHIL NFR BLD AUTO: 2.1 % (ref 0.3–6.2)
ERYTHROCYTE [DISTWIDTH] IN BLOOD BY AUTOMATED COUNT: 14.7 % (ref 12.3–15.4)
FERRITIN SERPL-MCNC: 443.7 NG/ML (ref 30–400)
FLUAV RNA RESP QL NAA+PROBE: NOT DETECTED
FLUBV RNA RESP QL NAA+PROBE: NOT DETECTED
GEN 5 2HR TROPONIN T REFLEX: 80 NG/L
GLOBULIN UR ELPH-MCNC: 3.4 GM/DL
GLUCOSE BLDC GLUCOMTR-MCNC: 120 MG/DL (ref 70–130)
GLUCOSE BLDC GLUCOMTR-MCNC: 120 MG/DL (ref 70–130)
GLUCOSE BLDC GLUCOMTR-MCNC: 129 MG/DL (ref 70–130)
GLUCOSE BLDC GLUCOMTR-MCNC: 187 MG/DL (ref 70–130)
GLUCOSE BLDC GLUCOMTR-MCNC: 33 MG/DL (ref 70–130)
GLUCOSE BLDC GLUCOMTR-MCNC: 36 MG/DL (ref 70–130)
GLUCOSE BLDC GLUCOMTR-MCNC: 47 MG/DL (ref 70–130)
GLUCOSE BLDC GLUCOMTR-MCNC: 48 MG/DL (ref 70–130)
GLUCOSE BLDC GLUCOMTR-MCNC: 70 MG/DL (ref 70–130)
GLUCOSE SERPL-MCNC: 104 MG/DL (ref 65–99)
GLUCOSE UR STRIP-MCNC: NEGATIVE MG/DL
HCT VFR BLD AUTO: 25.1 % (ref 37.5–51)
HGB BLD-MCNC: 7.9 G/DL (ref 13–17.7)
HGB UR QL STRIP.AUTO: NEGATIVE
HOLD SPECIMEN: NORMAL
IMM GRANULOCYTES # BLD AUTO: 0.02 10*3/MM3 (ref 0–0.05)
IMM GRANULOCYTES NFR BLD AUTO: 0.3 % (ref 0–0.5)
IRON 24H UR-MRATE: 57 MCG/DL (ref 59–158)
IRON 24H UR-MRATE: 57 MCG/DL (ref 59–158)
IRON SATN MFR SERPL: 17 % (ref 20–50)
KETONES UR QL STRIP: NEGATIVE
LEUKOCYTE ESTERASE UR QL STRIP.AUTO: NEGATIVE
LYMPHOCYTES # BLD AUTO: 1.62 10*3/MM3 (ref 0.7–3.1)
LYMPHOCYTES NFR BLD AUTO: 27.9 % (ref 19.6–45.3)
MAGNESIUM SERPL-MCNC: 2.2 MG/DL (ref 1.6–2.4)
MCH RBC QN AUTO: 30.3 PG (ref 26.6–33)
MCHC RBC AUTO-ENTMCNC: 31.5 G/DL (ref 31.5–35.7)
MCV RBC AUTO: 96.2 FL (ref 79–97)
MONOCYTES # BLD AUTO: 0.55 10*3/MM3 (ref 0.1–0.9)
MONOCYTES NFR BLD AUTO: 9.5 % (ref 5–12)
NEUTROPHILS NFR BLD AUTO: 3.48 10*3/MM3 (ref 1.7–7)
NEUTROPHILS NFR BLD AUTO: 59.9 % (ref 42.7–76)
NITRITE UR QL STRIP: NEGATIVE
NRBC BLD AUTO-RTO: 0 /100 WBC (ref 0–0.2)
PH UR STRIP.AUTO: 5.5 [PH] (ref 5–8)
PLATELET # BLD AUTO: 283 10*3/MM3 (ref 140–450)
PMV BLD AUTO: 10.7 FL (ref 6–12)
POTASSIUM SERPL-SCNC: 5.8 MMOL/L (ref 3.5–5.2)
POTASSIUM SERPL-SCNC: 6.3 MMOL/L (ref 3.5–5.2)
POTASSIUM SERPL-SCNC: 6.3 MMOL/L (ref 3.5–5.2)
PROT SERPL-MCNC: 7.2 G/DL (ref 6–8.5)
PROT UR QL STRIP: NEGATIVE
RBC # BLD AUTO: 2.61 10*6/MM3 (ref 4.14–5.8)
RETICS # AUTO: 0.06 10*6/MM3 (ref 0.02–0.13)
RETICS/RBC NFR AUTO: 2.2 % (ref 0.7–1.9)
SARS-COV-2 RNA RESP QL NAA+PROBE: NOT DETECTED
SODIUM SERPL-SCNC: 133 MMOL/L (ref 136–145)
SP GR UR STRIP: 1.02 (ref 1–1.03)
T4 FREE SERPL-MCNC: 1.24 NG/DL (ref 0.92–1.68)
TIBC SERPL-MCNC: 337 MCG/DL (ref 298–536)
TRANSFERRIN SERPL-MCNC: 226 MG/DL (ref 200–360)
TROPONIN T DELTA: -4 NG/L
TROPONIN T SERPL HS-MCNC: 84 NG/L
TSH SERPL DL<=0.05 MIU/L-ACNC: 4.1 UIU/ML (ref 0.27–4.2)
UROBILINOGEN UR QL STRIP: NORMAL
VALPROATE SERPL-MCNC: 40 MCG/ML (ref 50–125)
WBC NRBC COR # BLD AUTO: 5.81 10*3/MM3 (ref 3.4–10.8)
WHOLE BLOOD HOLD COAG: NORMAL
WHOLE BLOOD HOLD SPECIMEN: NORMAL

## 2024-11-07 PROCEDURE — 83540 ASSAY OF IRON: CPT | Performed by: NURSE PRACTITIONER

## 2024-11-07 PROCEDURE — 82948 REAGENT STRIP/BLOOD GLUCOSE: CPT

## 2024-11-07 PROCEDURE — 86850 RBC ANTIBODY SCREEN: CPT | Performed by: NURSE PRACTITIONER

## 2024-11-07 PROCEDURE — P9612 CATHETERIZE FOR URINE SPEC: HCPCS

## 2024-11-07 PROCEDURE — 85025 COMPLETE CBC W/AUTO DIFF WBC: CPT

## 2024-11-07 PROCEDURE — 83735 ASSAY OF MAGNESIUM: CPT | Performed by: EMERGENCY MEDICINE

## 2024-11-07 PROCEDURE — 84439 ASSAY OF FREE THYROXINE: CPT | Performed by: EMERGENCY MEDICINE

## 2024-11-07 PROCEDURE — G0378 HOSPITAL OBSERVATION PER HR: HCPCS

## 2024-11-07 PROCEDURE — 81003 URINALYSIS AUTO W/O SCOPE: CPT | Performed by: EMERGENCY MEDICINE

## 2024-11-07 PROCEDURE — 87636 SARSCOV2 & INF A&B AMP PRB: CPT | Performed by: EMERGENCY MEDICINE

## 2024-11-07 PROCEDURE — 99285 EMERGENCY DEPT VISIT HI MDM: CPT

## 2024-11-07 PROCEDURE — 80164 ASSAY DIPROPYLACETIC ACD TOT: CPT | Performed by: EMERGENCY MEDICINE

## 2024-11-07 PROCEDURE — 25010000002 CALCIUM GLUCONATE-NACL 1-0.675 GM/50ML-% SOLUTION: Performed by: EMERGENCY MEDICINE

## 2024-11-07 PROCEDURE — 82746 ASSAY OF FOLIC ACID SERUM: CPT | Performed by: NURSE PRACTITIONER

## 2024-11-07 PROCEDURE — 63710000001 INSULIN REGULAR HUMAN PER 5 UNITS: Performed by: EMERGENCY MEDICINE

## 2024-11-07 PROCEDURE — 84466 ASSAY OF TRANSFERRIN: CPT | Performed by: NURSE PRACTITIONER

## 2024-11-07 PROCEDURE — 99223 1ST HOSP IP/OBS HIGH 75: CPT | Performed by: NURSE PRACTITIONER

## 2024-11-07 PROCEDURE — 25810000003 SODIUM CHLORIDE 0.9 % SOLUTION: Performed by: EMERGENCY MEDICINE

## 2024-11-07 PROCEDURE — 87086 URINE CULTURE/COLONY COUNT: CPT | Performed by: EMERGENCY MEDICINE

## 2024-11-07 PROCEDURE — 80053 COMPREHEN METABOLIC PANEL: CPT | Performed by: EMERGENCY MEDICINE

## 2024-11-07 PROCEDURE — 84443 ASSAY THYROID STIM HORMONE: CPT | Performed by: EMERGENCY MEDICINE

## 2024-11-07 PROCEDURE — 84132 ASSAY OF SERUM POTASSIUM: CPT | Performed by: NURSE PRACTITIONER

## 2024-11-07 PROCEDURE — 71045 X-RAY EXAM CHEST 1 VIEW: CPT

## 2024-11-07 PROCEDURE — 86900 BLOOD TYPING SEROLOGIC ABO: CPT | Performed by: NURSE PRACTITIONER

## 2024-11-07 PROCEDURE — 36415 COLL VENOUS BLD VENIPUNCTURE: CPT

## 2024-11-07 PROCEDURE — 70450 CT HEAD/BRAIN W/O DYE: CPT

## 2024-11-07 PROCEDURE — 85045 AUTOMATED RETICULOCYTE COUNT: CPT | Performed by: NURSE PRACTITIONER

## 2024-11-07 PROCEDURE — 82728 ASSAY OF FERRITIN: CPT | Performed by: NURSE PRACTITIONER

## 2024-11-07 PROCEDURE — 84132 ASSAY OF SERUM POTASSIUM: CPT | Performed by: EMERGENCY MEDICINE

## 2024-11-07 PROCEDURE — 86901 BLOOD TYPING SEROLOGIC RH(D): CPT | Performed by: NURSE PRACTITIONER

## 2024-11-07 PROCEDURE — 82607 VITAMIN B-12: CPT | Performed by: NURSE PRACTITIONER

## 2024-11-07 PROCEDURE — 93005 ELECTROCARDIOGRAM TRACING: CPT | Performed by: EMERGENCY MEDICINE

## 2024-11-07 PROCEDURE — 84484 ASSAY OF TROPONIN QUANT: CPT | Performed by: EMERGENCY MEDICINE

## 2024-11-07 RX ORDER — DEXTROSE MONOHYDRATE 25 G/50ML
25 INJECTION, SOLUTION INTRAVENOUS ONCE
Status: COMPLETED | OUTPATIENT
Start: 2024-11-07 | End: 2024-11-07

## 2024-11-07 RX ORDER — IPRATROPIUM BROMIDE AND ALBUTEROL SULFATE 2.5; .5 MG/3ML; MG/3ML
3 SOLUTION RESPIRATORY (INHALATION) EVERY 4 HOURS PRN
Status: DISCONTINUED | OUTPATIENT
Start: 2024-11-07 | End: 2024-11-15 | Stop reason: HOSPADM

## 2024-11-07 RX ORDER — SODIUM CHLORIDE 0.9 % (FLUSH) 0.9 %
10 SYRINGE (ML) INJECTION AS NEEDED
Status: DISCONTINUED | OUTPATIENT
Start: 2024-11-07 | End: 2024-11-15 | Stop reason: HOSPADM

## 2024-11-07 RX ORDER — VALPROIC ACID 250 MG/5ML
150 SOLUTION ORAL EVERY 8 HOURS SCHEDULED
Status: DISCONTINUED | OUTPATIENT
Start: 2024-11-07 | End: 2024-11-08

## 2024-11-07 RX ORDER — QUETIAPINE FUMARATE 25 MG/1
25 TABLET, FILM COATED ORAL NIGHTLY
Status: DISCONTINUED | OUTPATIENT
Start: 2024-11-07 | End: 2024-11-08

## 2024-11-07 RX ORDER — DEXTROSE MONOHYDRATE 25 G/50ML
25 INJECTION, SOLUTION INTRAVENOUS ONCE
Status: DISCONTINUED | OUTPATIENT
Start: 2024-11-07 | End: 2024-11-07

## 2024-11-07 RX ORDER — TERAZOSIN 5 MG/1
5 CAPSULE ORAL EVERY 12 HOURS SCHEDULED
Status: DISCONTINUED | OUTPATIENT
Start: 2024-11-07 | End: 2024-11-08

## 2024-11-07 RX ORDER — INSULIN LISPRO 100 [IU]/ML
2-7 INJECTION, SOLUTION INTRAVENOUS; SUBCUTANEOUS
Status: DISCONTINUED | OUTPATIENT
Start: 2024-11-08 | End: 2024-11-08

## 2024-11-07 RX ORDER — BRIMONIDINE TARTRATE 2 MG/ML
1 SOLUTION/ DROPS OPHTHALMIC 3 TIMES DAILY
Status: DISCONTINUED | OUTPATIENT
Start: 2024-11-07 | End: 2024-11-15 | Stop reason: HOSPADM

## 2024-11-07 RX ORDER — BISACODYL 10 MG
10 SUPPOSITORY, RECTAL RECTAL DAILY PRN
Status: DISCONTINUED | OUTPATIENT
Start: 2024-11-07 | End: 2024-11-08

## 2024-11-07 RX ORDER — SODIUM CHLORIDE 9 MG/ML
40 INJECTION, SOLUTION INTRAVENOUS AS NEEDED
Status: DISCONTINUED | OUTPATIENT
Start: 2024-11-07 | End: 2024-11-15 | Stop reason: HOSPADM

## 2024-11-07 RX ORDER — POLYETHYLENE GLYCOL 3350 17 G/17G
17 POWDER, FOR SOLUTION ORAL DAILY PRN
Status: DISCONTINUED | OUTPATIENT
Start: 2024-11-07 | End: 2024-11-08

## 2024-11-07 RX ORDER — HYDROXYZINE HYDROCHLORIDE 25 MG/1
25 TABLET, FILM COATED ORAL 3 TIMES DAILY PRN
Status: DISCONTINUED | OUTPATIENT
Start: 2024-11-07 | End: 2024-11-08

## 2024-11-07 RX ORDER — BISACODYL 5 MG/1
5 TABLET, DELAYED RELEASE ORAL DAILY PRN
Status: DISCONTINUED | OUTPATIENT
Start: 2024-11-07 | End: 2024-11-08

## 2024-11-07 RX ORDER — CALCIUM GLUCONATE 20 MG/ML
1000 INJECTION, SOLUTION INTRAVENOUS ONCE
Status: COMPLETED | OUTPATIENT
Start: 2024-11-07 | End: 2024-11-07

## 2024-11-07 RX ORDER — SODIUM CHLORIDE 0.9 % (FLUSH) 0.9 %
10 SYRINGE (ML) INJECTION EVERY 12 HOURS SCHEDULED
Status: DISCONTINUED | OUTPATIENT
Start: 2024-11-07 | End: 2024-11-15 | Stop reason: HOSPADM

## 2024-11-07 RX ORDER — ACETAMINOPHEN 160 MG/5ML
650 SOLUTION ORAL EVERY 6 HOURS PRN
Status: DISCONTINUED | OUTPATIENT
Start: 2024-11-07 | End: 2024-11-08

## 2024-11-07 RX ORDER — NITROGLYCERIN 0.4 MG/1
0.4 TABLET SUBLINGUAL
Status: DISCONTINUED | OUTPATIENT
Start: 2024-11-07 | End: 2024-11-15 | Stop reason: HOSPADM

## 2024-11-07 RX ORDER — AMOXICILLIN 250 MG
2 CAPSULE ORAL 2 TIMES DAILY
Status: DISCONTINUED | OUTPATIENT
Start: 2024-11-07 | End: 2024-11-08

## 2024-11-07 RX ORDER — TIMOLOL MALEATE 5 MG/ML
1 SOLUTION/ DROPS OPHTHALMIC 2 TIMES DAILY
Status: DISCONTINUED | OUTPATIENT
Start: 2024-11-07 | End: 2024-11-15 | Stop reason: HOSPADM

## 2024-11-07 RX ORDER — FLUOXETINE 20 MG/5ML
20 SOLUTION ORAL DAILY
Status: DISCONTINUED | OUTPATIENT
Start: 2024-11-08 | End: 2024-11-08

## 2024-11-07 RX ORDER — IBUPROFEN 600 MG/1
1 TABLET ORAL
Status: DISCONTINUED | OUTPATIENT
Start: 2024-11-07 | End: 2024-11-08

## 2024-11-07 RX ORDER — DEXTROSE MONOHYDRATE 25 G/50ML
25 INJECTION, SOLUTION INTRAVENOUS
Status: DISCONTINUED | OUTPATIENT
Start: 2024-11-07 | End: 2024-11-08

## 2024-11-07 RX ORDER — ARIPIPRAZOLE 5 MG/1
5 TABLET ORAL DAILY
Status: DISCONTINUED | OUTPATIENT
Start: 2024-11-08 | End: 2024-11-08

## 2024-11-07 RX ORDER — NICOTINE POLACRILEX 4 MG
15 LOZENGE BUCCAL
Status: DISCONTINUED | OUTPATIENT
Start: 2024-11-07 | End: 2024-11-08

## 2024-11-07 RX ADMIN — Medication 5 ML: at 23:30

## 2024-11-07 RX ADMIN — INSULIN HUMAN 10 UNITS: 100 INJECTION, SOLUTION PARENTERAL at 18:30

## 2024-11-07 RX ADMIN — BRIMONIDINE TARTRATE 1 DROP: 2 SOLUTION/ DROPS OPHTHALMIC at 23:19

## 2024-11-07 RX ADMIN — DEXTROSE MONOHYDRATE 25 G: 25 INJECTION, SOLUTION INTRAVENOUS at 20:13

## 2024-11-07 RX ADMIN — CALCIUM GLUCONATE 1000 MG: 20 INJECTION, SOLUTION INTRAVENOUS at 18:33

## 2024-11-07 RX ADMIN — SODIUM ZIRCONIUM CYCLOSILICATE 10 G: 10 POWDER, FOR SUSPENSION ORAL at 18:30

## 2024-11-07 RX ADMIN — QUETIAPINE FUMARATE 25 MG: 25 TABLET ORAL at 23:14

## 2024-11-07 RX ADMIN — DEXTROSE MONOHYDRATE 25 G: 25 INJECTION, SOLUTION INTRAVENOUS at 22:04

## 2024-11-07 RX ADMIN — Medication 10 ML: at 23:19

## 2024-11-07 RX ADMIN — TIMOLOL MALEATE 1 DROP: 5 SOLUTION/ DROPS OPHTHALMIC at 23:19

## 2024-11-07 RX ADMIN — DEXTROSE MONOHYDRATE 25 G: 25 INJECTION, SOLUTION INTRAVENOUS at 18:31

## 2024-11-07 RX ADMIN — SODIUM CHLORIDE 1000 ML: 9 INJECTION, SOLUTION INTRAVENOUS at 18:27

## 2024-11-07 RX ADMIN — TERAZOSIN HYDROCHLORIDE 5 MG: 5 CAPSULE ORAL at 23:15

## 2024-11-07 RX ADMIN — SODIUM CHLORIDE 1000 ML: 9 INJECTION, SOLUTION INTRAVENOUS at 19:40

## 2024-11-07 NOTE — LETTER
EMS Transport Request  For use at [facility], [place], [place], [place], and [place] only   Patient Name: Omkar Nava : 1955   Weight:77.1 kg (170 lb) Pick-up Location: [facility] BLS/ALS: BLS/ALS: BLS   Insurance: MEDICARE Auth End Date:    Pre-Cert #: D/C Summary complete:    Destination: [facility]   Contact Precautions: None   Equipment (O2, Fluids, etc.): None   Arrive By Date/Time:  Stretcher/WC: Stretcher   CM Requesting: Brinda Salvador, MSW Ext: [phone]   Notes/Medical Necessity: Dementia     ______________________________________________________________________    *Only 2 patient bags OR 1 carry-on size bag are permitted.  Wheelchairs and walkers CANNOT transported with the patient. Acknowledge: Yes

## 2024-11-07 NOTE — ED PROVIDER NOTES
[facility]    EMERGENCY DEPARTMENT ENCOUNTER      Pt Name: Omkar Nava  MRN: [id number]  YOB: 1955  Date of evaluation: 11/7/2024  Provider: John Jade MD    CHIEF COMPLAINT       Chief Complaint   Patient presents with    Fatigue         HISTORY OF PRESENT ILLNESS   Omkar Nava is a 69 y.o. male who presents to the emergency department from nursing facility with reported low blood pressure.  Per EMS, on their arrival blood pressure was normal.  Patient has severe vascular dementia and resides in a nursing facility.  Recently admitted to this facility for rhinovirus infection along with urinary tract infection.  Patient unable to provide any history.      Nursing notes were reviewed.    REVIEW OF SYSTEMS     ROS:  A chief complaint appropriate review of systems was completed and is negative except as noted in the HPI.      PAST MEDICAL HISTORY     Past Medical History:   Diagnosis Date    Anemia     Dementia     Diabetes mellitus     Dysphagia     GERD (gastroesophageal reflux disease)     History of alcohol abuse     History of cocaine use     History of marijuana use     Hypertension     Osteomyelitis     Poor historian     records obtained from nursing home records & his family    Visual impairment          SURGICAL HISTORY       Past Surgical History:   Procedure Laterality Date    AMPUTATION FOOT Left 10/18/2022    Procedure: PARTIAL FIRST RAY AMPUTATION LEFT;  Surgeon: Yeison Petty MD;  Location:  [facility];  Service: Orthopedics;  Laterality: Left;    AMPUTATION FOOT Left 12/5/2022    Procedure: Transmetatarsal of Left Foot;  Surgeon: Yeison Petty MD;  Location:  [facility];  Service: Orthopedics;  Laterality: Left;    ENDOSCOPY N/A 3/14/2024    Procedure: ESOPHAGOGASTRODUODENOSCOPY WITH JEJUNAL TUBE INSERTION AT BEDSIDE;  Surgeon: Brunner, Mark I, MD;  Location: [facility] ENDOSCOPY;  Service: Gastroenterology;  Laterality: N/A;    ENDOSCOPY W/ PEG TUBE PLACEMENT N/A 4/1/2024     Procedure: ESOPHAGOGASTRODUODENOSCOPY WITH PERCUTANEOUS ENDOSCOPIC GASTROSTOMY TUBE INSERTION;  Surgeon: Brunner, Mark I, MD;  Location: [facility] ENDOSCOPY;  Service: Gastroenterology;  Laterality: N/A;  EGD with PEG placement.  Secured at 3.5 cm    EYE SURGERY           CURRENT MEDICATIONS       Current Facility-Administered Medications:     dextrose (D50W) (25 g/50 mL) IV injection 25 g, 25 g, Intravenous, Once, John Jade MD    sodium chloride 0.9 % bolus 1,000 mL, 1,000 mL, Intravenous, Once, John Jade MD    sodium chloride 0.9 % flush 10 mL, 10 mL, Intravenous, PRN, John Jade MD    sodium zirconium cyclosilicate (LOKELMA) packet 10 g, 10 g, Oral, Once, John Jade MD    Current Outpatient Medications:     acetaminophen (TYLENOL) 160 MG/5ML solution, Administer 20.3 mL per G tube Every 6 (Six) Hours As Needed for Mild Pain or Fever., Disp: , Rfl:     amLODIPine (NORVASC) 10 MG tablet, Administer 1 tablet per G tube Daily., Disp: , Rfl:     ARIPiprazole (ABILIFY) 5 MG tablet, Administer 1 tablet per G tube Daily., Disp: , Rfl:     brimonidine (ALPHAGAN) 0.2 % ophthalmic solution, Administer 1 drop to both eyes 3 (Three) Times a Day., Disp: , Rfl:     carvedilol (COREG) 12.5 MG tablet, Administer 1 tablet per G tube Every 12 (Twelve) Hours., Disp: , Rfl:     Cholecalciferol (Vitamin D3) 50 MCG (2000 UT) tablet, Administer 1 tablet per G tube Daily., Disp: , Rfl:     cloNIDine (CATAPRES) 0.2 MG tablet, Administer 1 tablet per G tube Every 8 (Eight) Hours., Disp: , Rfl:     FLUoxetine (PROzac) 20 MG/5ML solution, Take 5 mL by mouth Daily., Disp: , Rfl:     hydrALAZINE (APRESOLINE) 100 MG tablet, Administer 1 tablet per G tube Every 8 (Eight) Hours., Disp: , Rfl:     hydrOXYzine (ATARAX) 25 MG tablet, Administer 1 tablet per G tube 3 (Three) Times a Day As Needed for Anxiety, Allergies or Itching., Disp: , Rfl:     ipratropium-albuterol (DUO-NEB) 0.5-2.5 mg/3 ml nebulizer, Take 3 mL by  nebulization Every 4 (Four) Hours As Needed for Shortness of Air or Wheezing., Disp: , Rfl:     lansoprazole (PREVACID SOLUTAB) 30 MG Tablet Delayed Release Dispersible disintegrating tablet, 1 tablet by Nasogastric route Every Morning., Disp: , Rfl:     metFORMIN (GLUCOPHAGE) 500 MG tablet, Administer 1 tablet per G tube 2 (Two) Times a Day With Meals., Disp: , Rfl:     PALLIATIVE CARE ORAL RINSE, [facility],, Apply 5 mL to the mouth or throat 4 (Four) Times a Day., Disp: , Rfl:     polyethylene glycol (MIRALAX) 17 g packet, Take 17 g by mouth Daily., Disp: 30 each, Rfl: 0    QUEtiapine (SEROquel) 25 MG tablet, Administer 1 tablet per G tube Every Night., Disp: , Rfl:     sennosides (SENOKOT) 8.8 MG/5ML syrup, Administer 10 mL per G tube Every Night., Disp: , Rfl:     terazosin (HYTRIN) 5 MG capsule, Administer 1 capsule per G tube Every 12 (Twelve) Hours., Disp: , Rfl:     timolol (TIMOPTIC) 0.5 % ophthalmic solution, Administer 1 drop to both eyes 2 (Two) Times a Day., Disp: , Rfl:     torsemide (DEMADEX) 5 MG tablet, Administer 1 tablet per G tube Daily., Disp: , Rfl:     Valproic Acid (DEPAKENE) 250 MG/5ML solution syrup, 3 mL by Nasogastric route Every 8 (Eight) Hours., Disp: , Rfl:     ALLERGIES     Patient has no known allergies.    FAMILY HISTORY       Family History   Problem Relation Age of Onset    Diabetes Mother     Hypertension Mother     Hypertension Father     Diabetes Father     Diabetes Sister     Hypertension Brother     Diabetes Brother     Diabetes Maternal Grandmother     Diabetes Maternal Grandfather     Hypertension Brother     Diabetes Brother           SOCIAL HISTORY       Social History     Socioeconomic History    Marital status: Single   Tobacco Use    Smoking status: Former     Current packs/day: 0.00     Average packs/day: 1 pack/day for 40.0 years (40.0 ttl pk-yrs)     Types: Cigarettes     Start date: 1977     Quit date: 2017     Years since quittin.5     Passive exposure: Past  "   Smokeless tobacco: Never   Vaping Use    Vaping status: Never Used   Substance and Sexual Activity    Alcohol use: Not Currently     Comment: histgry of alcohol abuse    Drug use: Yes     Types: Marijuana, \"Crack\" cocaine     Comment: PATIENT STATES \"IT'S BEEN A FEW DAYS\"    Sexual activity: Defer         PHYSICAL EXAM    (up to 7 for level 4, 8 or more for level 5)     Vitals:    11/07/24 1642 11/07/24 1730 11/07/24 1800 11/07/24 1801   BP: 113/59 131/69 134/59    BP Location: Right arm      Pulse: (!) 48 (!) 47  (!) 47   Resp: 19      SpO2: 98% 98%  98%       General: Awake, alert, no acute distress.  HEENT: Conjunctivae normal.  Neck: Trachea midline.  Cardiac: Heart regular rate, rhythm, no murmurs, rubs, or gallops  Lungs: Lungs are clear to auscultation, there is no wheezing, rhonchi, or rales. There is no use of accessory muscles.  Chest wall: There is no tenderness to palpation over the chest wall or over ribs  Abdomen: Abdomen is soft, nontender, nondistended. There are no firm or pulsatile masses, no rebound rigidity or guarding.   Musculoskeletal: No deformity.  Neuro: Alert.  Follows commands and moves all extremities.  Dermatology: Skin is warm and dry  Psych: Mentation is grossly normal, cognition is grossly normal. Affect is appropriate.        DIAGNOSTIC RESULTS     EKG: All EKGs are interpreted by the Emergency Department Physician who either signs or Co-signs this chart in the absence of a cardiologist.    ECG 12 Lead ED Triage Standing Order; Weak / Dizzy / AMS   Preliminary Result   Test Reason : ED Triage Standing Order~   Blood Pressure :   */*   mmHG   Vent. Rate :  48 BPM     Atrial Rate :  48 BPM      P-R Int : 260 ms          QRS Dur :  94 ms       QT Int : 450 ms       P-R-T Axes :  51   8  15 degrees     QTcB Int : 402 ms      Sinus bradycardia with sinus arrhythmia with 1st degree AV block   Otherwise normal ECG   When compared with ECG of 29-Oct-2024 16:36,   Criteria for Septal " infarct are no longer present      Referred By: EDMD           Confirmed By:             RADIOLOGY:   [x] Radiologist's Report Reviewed:  CT Head Without Contrast   Final Result   Impression:      No acute intracranial process evident.      Age-related involutional changes and findings compatible with changes of chronic microvascular ischemia.            Electronically Signed: Quentin Osorio MD     11/7/2024 5:06 PM EST     Workstation ID: [id number]      XR Chest 1 View   Final Result   Impression:   No radiographic evidence of acute chest process.         Electronically Signed: Quentin Osorio MD     11/7/2024 4:33 PM EST     Workstation ID: [id number]          I ordered and independently reviewed the above noted radiographic studies.        LABS:    I have reviewed and interpreted all of the currently available lab results from this visit (if applicable):  Results for orders placed or performed during the hospital encounter of 11/07/24   Comprehensive Metabolic Panel    Collection Time: 11/07/24  4:18 PM    Specimen: Blood   Result Value Ref Range    Glucose 104 (H) 65 - 99 mg/dL    BUN 42 (H) 8 - 23 mg/dL    Creatinine 1.39 (H) 0.76 - 1.27 mg/dL    Sodium 133 (L) 136 - 145 mmol/L    Potassium 6.3 (C) 3.5 - 5.2 mmol/L    Chloride 98 98 - 107 mmol/L    CO2 26.0 22.0 - 29.0 mmol/L    Calcium 9.9 8.6 - 10.5 mg/dL    Total Protein 7.2 6.0 - 8.5 g/dL    Albumin 3.8 3.5 - 5.2 g/dL    ALT (SGPT) 20 1 - 41 U/L    AST (SGOT) 14 1 - 40 U/L    Alkaline Phosphatase 85 39 - 117 U/L    Total Bilirubin <0.2 0.0 - 1.2 mg/dL    Globulin 3.4 gm/dL    A/G Ratio 1.1 g/dL    BUN/Creatinine Ratio 30.2 (H) 7.0 - 25.0    Anion Gap 9.0 5.0 - 15.0 mmol/L    eGFR 55.6 (L) >60.0 mL/min/1.73   Single High Sensitivity Troponin T    Collection Time: 11/07/24  4:18 PM    Specimen: Blood   Result Value Ref Range    HS Troponin T 84 (C) <22 ng/L   Magnesium    Collection Time: 11/07/24  4:18 PM    Specimen: Blood   Result Value Ref Range    Magnesium  2.2 1.6 - 2.4 mg/dL   CBC Auto Differential    Collection Time: 11/07/24  4:18 PM    Specimen: Blood   Result Value Ref Range    WBC 5.81 3.40 - 10.80 10*3/mm3    RBC 2.61 (L) 4.14 - 5.80 10*6/mm3    Hemoglobin 7.9 (L) 13.0 - 17.7 g/dL    Hematocrit 25.1 (L) 37.5 - 51.0 %    MCV 96.2 79.0 - 97.0 fL    MCH 30.3 26.6 - 33.0 pg    MCHC 31.5 31.5 - 35.7 g/dL    RDW 14.7 12.3 - 15.4 %    RDW-SD 51.7 37.0 - 54.0 fl    MPV 10.7 6.0 - 12.0 fL    Platelets 283 140 - 450 10*3/mm3    Neutrophil % 59.9 42.7 - 76.0 %    Lymphocyte % 27.9 19.6 - 45.3 %    Monocyte % 9.5 5.0 - 12.0 %    Eosinophil % 2.1 0.3 - 6.2 %    Basophil % 0.3 0.0 - 1.5 %    Immature Grans % 0.3 0.0 - 0.5 %    Neutrophils, Absolute 3.48 1.70 - 7.00 10*3/mm3    Lymphocytes, Absolute 1.62 0.70 - 3.10 10*3/mm3    Monocytes, Absolute 0.55 0.10 - 0.90 10*3/mm3    Eosinophils, Absolute 0.12 0.00 - 0.40 10*3/mm3    Basophils, Absolute 0.02 0.00 - 0.20 10*3/mm3    Immature Grans, Absolute 0.02 0.00 - 0.05 10*3/mm3    nRBC 0.0 0.0 - 0.2 /100 WBC   TSH    Collection Time: 11/07/24  4:18 PM    Specimen: Blood   Result Value Ref Range    TSH 4.100 0.270 - 4.200 uIU/mL   T4, Free    Collection Time: 11/07/24  4:18 PM    Specimen: Blood   Result Value Ref Range    Free T4 1.24 0.92 - 1.68 ng/dL   Valproic Acid Level, Total    Collection Time: 11/07/24  4:18 PM    Specimen: Blood   Result Value Ref Range    Valproic Acid 40.0 (L) 50.0 - 125.0 mcg/mL   Green Top (Gel)    Collection Time: 11/07/24  4:18 PM   Result Value Ref Range    Extra Tube Hold for add-ons.    Lavender Top    Collection Time: 11/07/24  4:18 PM   Result Value Ref Range    Extra Tube hold for add-on    Gold Top - SST    Collection Time: 11/07/24  4:18 PM   Result Value Ref Range    Extra Tube Hold for add-ons.    Gray Top    Collection Time: 11/07/24  4:18 PM   Result Value Ref Range    Extra Tube Hold for add-ons.    Light Blue Top    Collection Time: 11/07/24  4:18 PM   Result Value Ref Range    Extra  Tube Hold for add-ons.    COVID-19 and FLU A/B PCR, 1 HR TAT - Swab, Nasopharynx    Collection Time: 11/07/24  5:29 PM    Specimen: Nasopharynx; Swab   Result Value Ref Range    COVID19 Not Detected Not Detected - Ref. Range    Influenza A PCR Not Detected Not Detected    Influenza B PCR Not Detected Not Detected   High Sensitivity Troponin T 2Hr    Collection Time: 11/07/24  5:29 PM    Specimen: Blood   Result Value Ref Range    HS Troponin T 80 (C) <22 ng/L    Troponin T Delta -4 (L) >=-4 - <+4 ng/L   Potassium    Collection Time: 11/07/24  5:29 PM    Specimen: Blood   Result Value Ref Range    Potassium 6.3 (C) 3.5 - 5.2 mmol/L   ECG 12 Lead ED Triage Standing Order; Weak / Dizzy / AMS    Collection Time: 11/07/24  5:36 PM   Result Value Ref Range    QT Interval 450 ms   Urinalysis With Culture If Indicated - Urine, Catheter    Collection Time: 11/07/24  6:06 PM    Specimen: Urine, Catheter   Result Value Ref Range    Color, UA Yellow Yellow, Straw    Appearance, UA Clear Clear    pH, UA 5.5 5.0 - 8.0    Specific Gravity, UA 1.017 1.001 - 1.030    Glucose, UA Negative Negative    Ketones, UA Negative Negative    Bilirubin, UA Negative Negative    Blood, UA Negative Negative    Protein, UA Negative Negative    Leuk Esterase, UA Negative Negative    Nitrite, UA Negative Negative    Urobilinogen, UA 0.2 E.U./dL 0.2 - 1.0 E.U./dL        If labs were ordered, I independently reviewed the results and considered them in treating the patient.      EMERGENCY DEPARTMENT COURSE and DIFFERENTIAL DIAGNOSIS/MDM:   Vitals:  AS OF 20:07 EST    BP - 134/59  HR - (!) 47  TEMP -    O2 SATS - 98%        Discussion below represents my analysis of pertinent findings related to patient's condition, differential diagnosis, treatment plan and final disposition.      Differential diagnosis:  The differential diagnosis associated with the patient's presentation includes: CVA, adrenal hemorrhage, intracranial mass, seizure, UTI, pneumonia,  "electrolyte derangement, dehydration      Independent interpretations (ECG/rhythm strip/X-ray/US/CT scan): I independently interpreted the patient's head CT and chest x-ray.  There is no evidence of ICH and there is no evidence of pulmonary infiltrate.      Additional sources:  Discussed/obtained information from independent historians:   [] Spouse:   [] Parent:   [] Friend:   [] EMS:   [x] Other: Additional history obtained from the patient's daughter over the phone.  Reports that she saw her father last night at his facility and he was \"off.\"  Prior to that she had not seen him since his discharge from the hospital.  External (non-ED) record review:   [] Inpatient record:   [] Office record:   [] Outpatient record:   [] Prior Outpatient labs:   [] Prior Outpatient radiology:   [] Primary Care record:   [] Outside ED record:   [x] Other: I reviewed discharge summary from October 30.  Patient was admitted for rhinovirus infection/UTI with associated alteration in mental status.      Patient's care impacted by:   [x] Diabetes   [] Hypertension   [] Coronary Artery Disease   [] Cancer   [x] Other: Recent hospitalization, advanced vascular dementia    Care significantly affected by Social Determinants of Health (housing and economic circumstances, unemployment)    [] Yes     [x] No   If yes, Patient's care significantly limited by  Social Determinants of Health including:    [] Inadequate housing    [] Low income    [] Alcoholism and drug addiction in family    [] Problems related to primary support group    [] Unemployment    [] Problems related to employment    [] Other Social Determinants of Health:       Consideration of admission/observation vs discharge: Patient presents with acute kidney injury with significant hyperkalemia and warrants admission for further management      I considered prescription management with:    [] Pain medication:   [] Antiviral:   [] Antibiotic:   [x] Other: Patient given IV calcium, " "insulin    ED Course:    ED Course as of 11/07/24 2007   Thu Nov 07, 2024 1648 I spoke with patient's daughter on the phone.  She reports that she has not seen her father last night and reports that he was \"off\" but reports that prior to that she had not seen him since his discharge from the hospital on October 30. [NS]   1812 Patient presents with increased generalized weakness over the course of the past few days. Recent admission here with discharge on October 30 for rhinovirus/UTI/encephalopathy. Has vascular dementia and resides in a nursing facility. Daughter went to check on him last night and said that he seemed \"off.\" Blood pressure was noted to be low for nursing facility staff today and they sent patient to the ED. History limited from the patient due to dementia but no focal deficits, can follow commands. Head CT is unremarkable. Patient has JAYY with some hyperkalemia today. Creatinine 1.3 up from 0.7 during recent admission with potassium of 6.3 confirmed on recheck. No concerning EKG changes. He has some bradycardia but looks like this has been present previously with baseline heart rate of around 50. Have ordered dose of Lokelma, insulin with dextrose, calcium. [NS]   1812 I discussed case with hospitalist Dr. Archer.  Discussed history, presentation, workup.  Accept patient for admission. [NS]   2006 I was informed by patient's nurse that patient was severely hypoglycemic.  Have ordered amp of D50. [NS]      ED Course User Index  [NS] John Jade MD           CRITICAL CARE TIME    Approximately 35 minutes of discontinuous critical care time was provided to this patient by myself absent of any time spent performing procedures.  Patient presents critically ill with acute kidney injury with associated hyperkalemia placing the cardiovascular system at risk requiring the following interventions: Administration of IV calcium, IV insulin with dextrose, Lokelma, interpretation of labs/ECG/imaging, " frequent reassessment, coordination of admission.  Patient at high risk of deterioration and possibly death without these interventions.      FINAL IMPRESSION      1. Acute kidney injury    2. Hyperkalemia    3. Generalized weakness    4. History of diabetes mellitus    5. Vascular dementia, unspecified dementia severity, unspecified whether behavioral, psychotic, or mood disturbance or anxiety          DISPOSITION/PLAN     ED Disposition       ED Disposition   Decision to Admit    Condition   --    Comment   Level of Care: Telemetry [5]   Diagnosis: JAYY (acute kidney injury) [254439]   Admitting Physician: KERLEY, BRIAN JOSEPH [[id number]]   Attending Physician: KERLEY, BRIAN JOSEPH [[id number]]                   Comment: Please note this report has been produced using speech recognition software.      John Jade MD  Attending Emergency Physician             John Jade MD  11/07/24 1815       John Jade MD  11/07/24 2007

## 2024-11-07 NOTE — ED NOTES
Omkar Nava    Nursing Report ED to Floor:  Mental status: A+Ox1  Ambulatory status: Bedfast  Oxygen Therapy:  RA-2L NC at home  Cardiac Rhythm: Jim  Admitted from: [facility]  Safety Concerns:  Falls  Social Issues: Dementia?  ED Room #:  22    ED Nurse Phone Extension - [phone] or may call [phone].      HPI:   Chief Complaint   Patient presents with    Fatigue       Past Medical History:  Past Medical History:   Diagnosis Date    Anemia     Dementia     Diabetes mellitus     Dysphagia     GERD (gastroesophageal reflux disease)     History of alcohol abuse     History of cocaine use     History of marijuana use     Hypertension     Osteomyelitis     Poor historian     records obtained from nursing home records & his family    Visual impairment         Past Surgical History:  Past Surgical History:   Procedure Laterality Date    AMPUTATION FOOT Left 10/18/2022    Procedure: PARTIAL FIRST RAY AMPUTATION LEFT;  Surgeon: Yeison Petty MD;  Location:  [facility];  Service: Orthopedics;  Laterality: Left;    AMPUTATION FOOT Left 12/5/2022    Procedure: Transmetatarsal of Left Foot;  Surgeon: Yeison Petty MD;  Location:  [facility];  Service: Orthopedics;  Laterality: Left;    ENDOSCOPY N/A 3/14/2024    Procedure: ESOPHAGOGASTRODUODENOSCOPY WITH JEJUNAL TUBE INSERTION AT BEDSIDE;  Surgeon: Brunner, Mark I, MD;  Location:  [facility];  Service: Gastroenterology;  Laterality: N/A;    ENDOSCOPY W/ PEG TUBE PLACEMENT N/A 4/1/2024    Procedure: ESOPHAGOGASTRODUODENOSCOPY WITH PERCUTANEOUS ENDOSCOPIC GASTROSTOMY TUBE INSERTION;  Surgeon: Brunner, Mark I, MD;  Location:  [facility];  Service: Gastroenterology;  Laterality: N/A;  EGD with PEG placement.  Secured at 3.5 cm    EYE SURGERY          Admitting Doctor:   Brian Joseph Kerley, DO    Consulting Provider(s):  Consults       Date and Time Order Name Status Description    10/28/2024  1:31 PM Inpatient Neurology Consult General Completed     10/28/2024  8:29 AM  Inpatient Infectious Diseases Consult Completed              Admitting Diagnosis:   The primary encounter diagnosis was Acute kidney injury. Diagnoses of Hyperkalemia, Generalized weakness, History of diabetes mellitus, and Vascular dementia, unspecified dementia severity, unspecified whether behavioral, psychotic, or mood disturbance or anxiety were also pertinent to this visit.    Most Recent Vitals:   Vitals:    11/07/24 1642 11/07/24 1730   BP: 113/59 131/69   BP Location: Right arm    Pulse: (!) 48 (!) 47   Resp: 19    SpO2: 98% 98%       Active LDAs/IV Access:   Lines, Drains & Airways       Active LDAs       Name Placement date Placement time Site Days    Peripheral IV 11/07/24 1606 Anterior;Distal;Left Forearm 11/07/24  1606  Forearm  less than 1    Gastrostomy/Enterostomy Percutaneous endoscopic gastrostomy (PEG) 1 20 Fr. LUQ 04/01/24  1453  LUQ  220                    Labs (abnormal labs have a star):   Labs Reviewed   COMPREHENSIVE METABOLIC PANEL - Abnormal; Notable for the following components:       Result Value    Glucose 104 (*)     BUN 42 (*)     Creatinine 1.39 (*)     Sodium 133 (*)     Potassium 6.3 (*)     BUN/Creatinine Ratio 30.2 (*)     eGFR 55.6 (*)     All other components within normal limits    Narrative:     GFR Normal >60  Chronic Kidney Disease <60  Kidney Failure <15     SINGLE HS TROPONIN T - Abnormal; Notable for the following components:    HS Troponin T 84 (*)     All other components within normal limits    Narrative:     High Sensitive Troponin T Reference Range:  <14.0 ng/L- Negative Female for AMI  <22.0 ng/L- Negative Male for AMI  >=14 - Abnormal Female indicating possible myocardial injury.  >=22 - Abnormal Male indicating possible myocardial injury.   Clinicians would have to utilize clinical acumen, EKG, Troponin, and serial changes to determine if it is an Acute Myocardial Infarction or myocardial injury due to an underlying chronic condition.        CBC WITH AUTO  DIFFERENTIAL - Abnormal; Notable for the following components:    RBC 2.61 (*)     Hemoglobin 7.9 (*)     Hematocrit 25.1 (*)     All other components within normal limits   VALPROIC ACID LEVEL, TOTAL - Abnormal; Notable for the following components:    Valproic Acid 40.0 (*)     All other components within normal limits    Narrative:     Therapeutic Ranges for Valproic Acid    Epilepsy:       mcg/ml  Bipolar/[place]  up to 125 mcg/ml     HIGH SENSITIVITIY TROPONIN T 2HR - Abnormal; Notable for the following components:    HS Troponin T 80 (*)     Troponin T Delta -4 (*)     All other components within normal limits    Narrative:     High Sensitive Troponin T Reference Range:  <14.0 ng/L- Negative Female for AMI  <22.0 ng/L- Negative Male for AMI  >=14 - Abnormal Female indicating possible myocardial injury.  >=22 - Abnormal Male indicating possible myocardial injury.   Clinicians would have to utilize clinical acumen, EKG, Troponin, and serial changes to determine if it is an Acute Myocardial Infarction or myocardial injury due to an underlying chronic condition.        POTASSIUM - Abnormal; Notable for the following components:    Potassium 6.3 (*)     All other components within normal limits   COVID-19 AND FLU A/B, NP SWAB IN TRANSPORT MEDIA 1 HR TAT - Normal    Narrative:     Fact sheet for providers: https://www.fda.gov/media/460651/download    Fact sheet for patients: https://www.fda.gov/media/512169/download    Test performed by PCR.   MAGNESIUM - Normal   URINALYSIS W/ CULTURE IF INDICATED - Normal    Narrative:     In absence of clinical symptoms, the presence of pyuria, bacteria, and/or nitrites on the urinalysis result does not correlate with infection.  Urine microscopic not indicated.   TSH - Normal   T4, FREE - Normal   COVID PRE-OP / PRE-PROCEDURE SCREENING ORDER (NO ISOLATION)    Narrative:     The following orders were created for panel order COVID PRE-OP / PRE-PROCEDURE SCREENING ORDER (NO  ISOLATION) - Swab, Nasopharynx.  Procedure                               Abnormality         Status                     ---------                               -----------         ------                     COVID-19 and FLU A/B PCR...[[id number]]  Normal              Final result                 Please view results for these tests on the individual orders.   URINE CULTURE   RAINBOW DRAW    Narrative:     The following orders were created for panel order [place] Draw.  Procedure                               Abnormality         Status                     ---------                               -----------         ------                     Green Top (Gel)[[id number]]                                  Final result               Lavender Top[[id number]]                                     Final result               Gold Top - SST[[id number]]                                   Final result               [place] Top[[id number]]                                         Final result               Light Blue Top[[id number]]                                   Final result                 Please view results for these tests on the individual orders.   POCT GLUCOSE FINGERSTICK   POCT GLUCOSE FINGERSTICK   POCT GLUCOSE FINGERSTICK   POCT GLUCOSE FINGERSTICK   CBC AND DIFFERENTIAL    Narrative:     The following orders were created for panel order CBC & Differential.  Procedure                               Abnormality         Status                     ---------                               -----------         ------                     CBC Auto Differential[[id number]]        Abnormal            Final result                 Please view results for these tests on the individual orders.   GREEN TOP   LAVENDER TOP   GOLD TOP - SST   GRAY TOP   LIGHT BLUE TOP       Meds Given in ED:   Medications   sodium chloride 0.9 % flush 10 mL (has no administration in time range)   sodium chloride 0.9 % bolus 1,000 mL (1,000 mL Intravenous New Bag  11/7/24 1827)   sodium chloride 0.9 % bolus 1,000 mL (has no administration in time range)   calcium gluconate 1000 Mg/50ml 0.675% NaCl IV SOLN (1,000 mg Intravenous New Bag 11/7/24 1833)   sodium zirconium cyclosilicate (LOKELMA) packet 10 g (has no administration in time range)   insulin regular (humuLIN R,novoLIN R) injection 10 Units (10 Units Intravenous Given 11/7/24 1830)   dextrose (D50W) (25 g/50 mL) IV injection 25 g (25 g Intravenous Given 11/7/24 1831)           Last NIH score:                                                          Dysphagia screening results:        [place] Coma Scale:  No data recorded     CIWA:        Restraint Type:            Isolation Status:  No active isolations

## 2024-11-08 LAB
ABO GROUP BLD: NORMAL
ANION GAP SERPL CALCULATED.3IONS-SCNC: 7 MMOL/L (ref 5–15)
ANION GAP SERPL CALCULATED.3IONS-SCNC: 9 MMOL/L (ref 5–15)
ANION GAP SERPL CALCULATED.3IONS-SCNC: 9 MMOL/L (ref 5–15)
BACTERIA SPEC AEROBE CULT: NO GROWTH
BASOPHILS # BLD AUTO: 0.02 10*3/MM3 (ref 0–0.2)
BASOPHILS NFR BLD AUTO: 0.4 % (ref 0–1.5)
BLD GP AB SCN SERPL QL: NEGATIVE
BUN SERPL-MCNC: 28 MG/DL (ref 8–23)
BUN SERPL-MCNC: 31 MG/DL (ref 8–23)
BUN SERPL-MCNC: 32 MG/DL (ref 8–23)
BUN/CREAT SERPL: 27.5 (ref 7–25)
BUN/CREAT SERPL: 29.2 (ref 7–25)
BUN/CREAT SERPL: 30.5 (ref 7–25)
CALCIUM SPEC-SCNC: 9.3 MG/DL (ref 8.6–10.5)
CALCIUM SPEC-SCNC: 9.4 MG/DL (ref 8.6–10.5)
CALCIUM SPEC-SCNC: 9.5 MG/DL (ref 8.6–10.5)
CHLORIDE SERPL-SCNC: 101 MMOL/L (ref 98–107)
CHLORIDE SERPL-SCNC: 103 MMOL/L (ref 98–107)
CHLORIDE SERPL-SCNC: 104 MMOL/L (ref 98–107)
CK SERPL-CCNC: 116 U/L (ref 20–200)
CO2 SERPL-SCNC: 23 MMOL/L (ref 22–29)
CO2 SERPL-SCNC: 24 MMOL/L (ref 22–29)
CO2 SERPL-SCNC: 25 MMOL/L (ref 22–29)
CREAT SERPL-MCNC: 1.02 MG/DL (ref 0.76–1.27)
CREAT SERPL-MCNC: 1.05 MG/DL (ref 0.76–1.27)
CREAT SERPL-MCNC: 1.06 MG/DL (ref 0.76–1.27)
DEPRECATED RDW RBC AUTO: 49.4 FL (ref 37–54)
EGFRCR SERPLBLD CKD-EPI 2021: 76.9 ML/MIN/1.73
EGFRCR SERPLBLD CKD-EPI 2021: 77.8 ML/MIN/1.73
EGFRCR SERPLBLD CKD-EPI 2021: 80.6 ML/MIN/1.73
EOSINOPHIL # BLD AUTO: 0.08 10*3/MM3 (ref 0–0.4)
EOSINOPHIL NFR BLD AUTO: 1.7 % (ref 0.3–6.2)
ERYTHROCYTE [DISTWIDTH] IN BLOOD BY AUTOMATED COUNT: 14.5 % (ref 12.3–15.4)
FOLATE SERPL-MCNC: 11.8 NG/ML (ref 4.78–24.2)
GLUCOSE BLDC GLUCOMTR-MCNC: 100 MG/DL (ref 70–130)
GLUCOSE BLDC GLUCOMTR-MCNC: 102 MG/DL (ref 70–130)
GLUCOSE BLDC GLUCOMTR-MCNC: 105 MG/DL (ref 70–130)
GLUCOSE BLDC GLUCOMTR-MCNC: 111 MG/DL (ref 70–130)
GLUCOSE BLDC GLUCOMTR-MCNC: 115 MG/DL (ref 70–130)
GLUCOSE BLDC GLUCOMTR-MCNC: 125 MG/DL (ref 70–130)
GLUCOSE BLDC GLUCOMTR-MCNC: 135 MG/DL (ref 70–130)
GLUCOSE BLDC GLUCOMTR-MCNC: 138 MG/DL (ref 70–130)
GLUCOSE BLDC GLUCOMTR-MCNC: 150 MG/DL (ref 70–130)
GLUCOSE BLDC GLUCOMTR-MCNC: 154 MG/DL (ref 70–130)
GLUCOSE BLDC GLUCOMTR-MCNC: 199 MG/DL (ref 70–130)
GLUCOSE BLDC GLUCOMTR-MCNC: 63 MG/DL (ref 70–130)
GLUCOSE BLDC GLUCOMTR-MCNC: 80 MG/DL (ref 70–130)
GLUCOSE BLDC GLUCOMTR-MCNC: 88 MG/DL (ref 70–130)
GLUCOSE BLDC GLUCOMTR-MCNC: 89 MG/DL (ref 70–130)
GLUCOSE SERPL-MCNC: 134 MG/DL (ref 65–99)
GLUCOSE SERPL-MCNC: 86 MG/DL (ref 65–99)
GLUCOSE SERPL-MCNC: 99 MG/DL (ref 65–99)
HCT VFR BLD AUTO: 24.1 % (ref 37.5–51)
HGB BLD-MCNC: 7.7 G/DL (ref 13–17.7)
IMM GRANULOCYTES # BLD AUTO: 0.01 10*3/MM3 (ref 0–0.05)
IMM GRANULOCYTES NFR BLD AUTO: 0.2 % (ref 0–0.5)
LYMPHOCYTES # BLD AUTO: 1.22 10*3/MM3 (ref 0.7–3.1)
LYMPHOCYTES NFR BLD AUTO: 25.6 % (ref 19.6–45.3)
MCH RBC QN AUTO: 30.1 PG (ref 26.6–33)
MCHC RBC AUTO-ENTMCNC: 32 G/DL (ref 31.5–35.7)
MCV RBC AUTO: 94.1 FL (ref 79–97)
MONOCYTES # BLD AUTO: 0.49 10*3/MM3 (ref 0.1–0.9)
MONOCYTES NFR BLD AUTO: 10.3 % (ref 5–12)
NEUTROPHILS NFR BLD AUTO: 2.94 10*3/MM3 (ref 1.7–7)
NEUTROPHILS NFR BLD AUTO: 61.8 % (ref 42.7–76)
NRBC BLD AUTO-RTO: 0 /100 WBC (ref 0–0.2)
PLATELET # BLD AUTO: 287 10*3/MM3 (ref 140–450)
PMV BLD AUTO: 10.7 FL (ref 6–12)
POTASSIUM SERPL-SCNC: 5.7 MMOL/L (ref 3.5–5.2)
POTASSIUM SERPL-SCNC: 6 MMOL/L (ref 3.5–5.2)
POTASSIUM SERPL-SCNC: 6.2 MMOL/L (ref 3.5–5.2)
RBC # BLD AUTO: 2.56 10*6/MM3 (ref 4.14–5.8)
RH BLD: POSITIVE
SODIUM SERPL-SCNC: 133 MMOL/L (ref 136–145)
SODIUM SERPL-SCNC: 135 MMOL/L (ref 136–145)
SODIUM SERPL-SCNC: 137 MMOL/L (ref 136–145)
T&S EXPIRATION DATE: NORMAL
VIT B12 BLD-MCNC: 1303 PG/ML (ref 211–946)
WBC NRBC COR # BLD AUTO: 4.76 10*3/MM3 (ref 3.4–10.8)

## 2024-11-08 PROCEDURE — 82948 REAGENT STRIP/BLOOD GLUCOSE: CPT

## 2024-11-08 PROCEDURE — 87046 STOOL CULTR AEROBIC BACT EA: CPT | Performed by: NURSE PRACTITIONER

## 2024-11-08 PROCEDURE — 92610 EVALUATE SWALLOWING FUNCTION: CPT

## 2024-11-08 PROCEDURE — 97165 OT EVAL LOW COMPLEX 30 MIN: CPT

## 2024-11-08 PROCEDURE — 97162 PT EVAL MOD COMPLEX 30 MIN: CPT

## 2024-11-08 PROCEDURE — 85025 COMPLETE CBC W/AUTO DIFF WBC: CPT | Performed by: NURSE PRACTITIONER

## 2024-11-08 PROCEDURE — 80048 BASIC METABOLIC PNL TOTAL CA: CPT | Performed by: NURSE PRACTITIONER

## 2024-11-08 PROCEDURE — 80048 BASIC METABOLIC PNL TOTAL CA: CPT | Performed by: STUDENT IN AN ORGANIZED HEALTH CARE EDUCATION/TRAINING PROGRAM

## 2024-11-08 PROCEDURE — 97530 THERAPEUTIC ACTIVITIES: CPT

## 2024-11-08 PROCEDURE — 25010000002 CALCIUM GLUCONATE-NACL 1-0.675 GM/50ML-% SOLUTION: Performed by: STUDENT IN AN ORGANIZED HEALTH CARE EDUCATION/TRAINING PROGRAM

## 2024-11-08 PROCEDURE — 63710000001 INSULIN REGULAR HUMAN PER 5 UNITS: Performed by: STUDENT IN AN ORGANIZED HEALTH CARE EDUCATION/TRAINING PROGRAM

## 2024-11-08 PROCEDURE — 99232 SBSQ HOSP IP/OBS MODERATE 35: CPT | Performed by: STUDENT IN AN ORGANIZED HEALTH CARE EDUCATION/TRAINING PROGRAM

## 2024-11-08 PROCEDURE — 87045 FECES CULTURE AEROBIC BACT: CPT | Performed by: NURSE PRACTITIONER

## 2024-11-08 PROCEDURE — 87427 SHIGA-LIKE TOXIN AG IA: CPT | Performed by: NURSE PRACTITIONER

## 2024-11-08 PROCEDURE — 82550 ASSAY OF CK (CPK): CPT | Performed by: STUDENT IN AN ORGANIZED HEALTH CARE EDUCATION/TRAINING PROGRAM

## 2024-11-08 RX ORDER — CALCIUM GLUCONATE 20 MG/ML
1000 INJECTION, SOLUTION INTRAVENOUS ONCE
Status: COMPLETED | OUTPATIENT
Start: 2024-11-08 | End: 2024-11-08

## 2024-11-08 RX ORDER — BISACODYL 10 MG
10 SUPPOSITORY, RECTAL RECTAL DAILY PRN
Status: DISCONTINUED | OUTPATIENT
Start: 2024-11-08 | End: 2024-11-15 | Stop reason: HOSPADM

## 2024-11-08 RX ORDER — ACETAMINOPHEN 160 MG/5ML
650 SOLUTION ORAL EVERY 6 HOURS PRN
Status: DISCONTINUED | OUTPATIENT
Start: 2024-11-08 | End: 2024-11-15 | Stop reason: HOSPADM

## 2024-11-08 RX ORDER — VALPROIC ACID 250 MG/5ML
150 SOLUTION ORAL EVERY 8 HOURS SCHEDULED
Status: DISCONTINUED | OUTPATIENT
Start: 2024-11-08 | End: 2024-11-14

## 2024-11-08 RX ORDER — IBUPROFEN 600 MG/1
1 TABLET ORAL
Status: DISCONTINUED | OUTPATIENT
Start: 2024-11-08 | End: 2024-11-15 | Stop reason: HOSPADM

## 2024-11-08 RX ORDER — NICOTINE POLACRILEX 4 MG
15 LOZENGE BUCCAL
Status: DISCONTINUED | OUTPATIENT
Start: 2024-11-08 | End: 2024-11-15 | Stop reason: HOSPADM

## 2024-11-08 RX ORDER — DEXTROSE MONOHYDRATE 25 G/50ML
25 INJECTION, SOLUTION INTRAVENOUS ONCE
Status: COMPLETED | OUTPATIENT
Start: 2024-11-08 | End: 2024-11-08

## 2024-11-08 RX ORDER — AMOXICILLIN 250 MG
2 CAPSULE ORAL 2 TIMES DAILY
Status: DISCONTINUED | OUTPATIENT
Start: 2024-11-08 | End: 2024-11-15 | Stop reason: HOSPADM

## 2024-11-08 RX ORDER — HYDROXYZINE HYDROCHLORIDE 25 MG/1
25 TABLET, FILM COATED ORAL 3 TIMES DAILY PRN
Status: DISCONTINUED | OUTPATIENT
Start: 2024-11-08 | End: 2024-11-15 | Stop reason: HOSPADM

## 2024-11-08 RX ORDER — DEXTROSE MONOHYDRATE 25 G/50ML
25 INJECTION, SOLUTION INTRAVENOUS
Status: DISCONTINUED | OUTPATIENT
Start: 2024-11-08 | End: 2024-11-15 | Stop reason: HOSPADM

## 2024-11-08 RX ORDER — QUETIAPINE FUMARATE 25 MG/1
25 TABLET, FILM COATED ORAL NIGHTLY
Status: DISCONTINUED | OUTPATIENT
Start: 2024-11-08 | End: 2024-11-15 | Stop reason: HOSPADM

## 2024-11-08 RX ORDER — TERAZOSIN 5 MG/1
5 CAPSULE ORAL EVERY 12 HOURS SCHEDULED
Status: DISCONTINUED | OUTPATIENT
Start: 2024-11-08 | End: 2024-11-15 | Stop reason: HOSPADM

## 2024-11-08 RX ORDER — FLUOXETINE 20 MG/5ML
20 SOLUTION ORAL DAILY
Status: DISCONTINUED | OUTPATIENT
Start: 2024-11-09 | End: 2024-11-15 | Stop reason: HOSPADM

## 2024-11-08 RX ORDER — ARIPIPRAZOLE 5 MG/1
5 TABLET ORAL DAILY
Status: DISCONTINUED | OUTPATIENT
Start: 2024-11-09 | End: 2024-11-15 | Stop reason: HOSPADM

## 2024-11-08 RX ORDER — POLYETHYLENE GLYCOL 3350 17 G/17G
17 POWDER, FOR SOLUTION ORAL DAILY PRN
Status: DISCONTINUED | OUTPATIENT
Start: 2024-11-08 | End: 2024-11-15 | Stop reason: HOSPADM

## 2024-11-08 RX ADMIN — Medication 10 ML: at 22:26

## 2024-11-08 RX ADMIN — VALPROIC ACID 150 MG: 250 SOLUTION ORAL at 14:30

## 2024-11-08 RX ADMIN — FLUOXETINE HYDROCHLORIDE 20 MG: 20 SOLUTION ORAL at 10:52

## 2024-11-08 RX ADMIN — VALPROIC ACID 150 MG: 250 SOLUTION ORAL at 00:38

## 2024-11-08 RX ADMIN — Medication 5 ML: at 22:39

## 2024-11-08 RX ADMIN — SODIUM ZIRCONIUM CYCLOSILICATE 10 G: 10 POWDER, FOR SUSPENSION ORAL at 08:50

## 2024-11-08 RX ADMIN — VALPROIC ACID 150 MG: 250 SOLUTION ORAL at 06:03

## 2024-11-08 RX ADMIN — SENNOSIDES AND DOCUSATE SODIUM 2 TABLET: 50; 8.6 TABLET ORAL at 22:21

## 2024-11-08 RX ADMIN — CALCIUM GLUCONATE 1000 MG: 20 INJECTION, SOLUTION INTRAVENOUS at 12:37

## 2024-11-08 RX ADMIN — TIMOLOL MALEATE 1 DROP: 5 SOLUTION/ DROPS OPHTHALMIC at 22:36

## 2024-11-08 RX ADMIN — BRIMONIDINE TARTRATE 1 DROP: 2 SOLUTION/ DROPS OPHTHALMIC at 16:53

## 2024-11-08 RX ADMIN — ARIPIPRAZOLE 5 MG: 5 TABLET ORAL at 10:52

## 2024-11-08 RX ADMIN — DEXTROSE MONOHYDRATE 25 G: 25 INJECTION, SOLUTION INTRAVENOUS at 17:11

## 2024-11-08 RX ADMIN — BRIMONIDINE TARTRATE 1 DROP: 2 SOLUTION/ DROPS OPHTHALMIC at 08:42

## 2024-11-08 RX ADMIN — Medication 5 ML: at 09:21

## 2024-11-08 RX ADMIN — TERAZOSIN HYDROCHLORIDE 5 MG: 5 CAPSULE ORAL at 10:52

## 2024-11-08 RX ADMIN — Medication 5 ML: at 12:49

## 2024-11-08 RX ADMIN — Medication 5 ML: at 17:52

## 2024-11-08 RX ADMIN — DEXTROSE MONOHYDRATE 25 G: 25 INJECTION, SOLUTION INTRAVENOUS at 14:23

## 2024-11-08 RX ADMIN — INSULIN HUMAN 10 UNITS: 100 INJECTION, SOLUTION PARENTERAL at 14:21

## 2024-11-08 RX ADMIN — TERAZOSIN HYDROCHLORIDE 5 MG: 5 CAPSULE ORAL at 22:21

## 2024-11-08 RX ADMIN — LANSOPRAZOLE 30 MG: 15 TABLET, ORALLY DISINTEGRATING, DELAYED RELEASE ORAL at 06:03

## 2024-11-08 RX ADMIN — Medication 400 UNITS: at 10:52

## 2024-11-08 RX ADMIN — CALCIUM GLUCONATE 1000 MG: 20 INJECTION, SOLUTION INTRAVENOUS at 15:44

## 2024-11-08 RX ADMIN — SODIUM ZIRCONIUM CYCLOSILICATE 10 G: 10 POWDER, FOR SUSPENSION ORAL at 16:53

## 2024-11-08 RX ADMIN — VALPROIC ACID 150 MG: 250 SOLUTION ORAL at 22:22

## 2024-11-08 RX ADMIN — QUETIAPINE FUMARATE 25 MG: 25 TABLET ORAL at 22:21

## 2024-11-08 RX ADMIN — BRIMONIDINE TARTRATE 1 DROP: 2 SOLUTION/ DROPS OPHTHALMIC at 22:36

## 2024-11-08 RX ADMIN — TIMOLOL MALEATE 1 DROP: 5 SOLUTION/ DROPS OPHTHALMIC at 08:42

## 2024-11-08 RX ADMIN — Medication 10 ML: at 10:53

## 2024-11-08 RX ADMIN — SODIUM ZIRCONIUM CYCLOSILICATE 10 G: 10 POWDER, FOR SUSPENSION ORAL at 22:23

## 2024-11-08 NOTE — THERAPY EVALUATION
Patient Name: Omkar Nava  : 1955    MRN: [id number]                              Today's Date: 2024       Admit Date: 2024    Visit Dx:     ICD-10-CM ICD-9-CM   1. Acute kidney injury  N17.9 584.9   2. Hyperkalemia  E87.5 276.7   3. Generalized weakness  R53.1 780.79   4. History of diabetes mellitus  Z86.39 V12.29   5. Vascular dementia, unspecified dementia severity, unspecified whether behavioral, psychotic, or mood disturbance or anxiety  F01.50 290.40   6. 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, without long-term current use of insulin    Acute osteomyelitis of left foot    Type 2 diabetes mellitus    Essential hypertension    Psychotic disorder    Neurocognitive disorder    S/P transmetatarsal amputation of foot, left    Abscess of left foot    Anemia of chronic disease    Aspiration pneumonia    Cervical spine pain    Chronic kidney disease    Closed head injury without loss of consciousness    Dementia    Dental cavities    Diarrhea of presumed infectious origin    Displaced fracture of proximal phalanx of left great toe, initial encounter for open fracture    Dysphagia    Erectile dysfunction    Generalized muscle weakness    Hallucinations    Hypertensive heart and chronic kidney disease 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 failure, with stage 1 through stage 4 chronic kidney disease, or unspecified chronic kidney disease    Insomnia due to medical condition    Iron deficiency anemia    Laceration without foreign body, left foot, subsequent encounter    Personal history of Methicillin resistant Staphylococcus aureus infection    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    Protein calorie malnutrition    Simple chronic bronchitis    Tobacco use    Type 2 diabetes mellitus with diabetic neuropathy, unspecified    Type 2 diabetes mellitus with  diabetic chronic kidney disease    Acute type 1 respiratory failure    Severe vascular dementia without behavioral disturbance, psychotic disturbance, mood disturbance, or anxiety    JAYY (acute kidney injury)    Generalized weakness    Hyperkalemia     Past Medical History:   Diagnosis Date    Anemia     Dementia     Diabetes mellitus     Dysphagia     GERD (gastroesophageal reflux disease)     History of alcohol abuse     History of cocaine use     History of marijuana use     Hypertension     Osteomyelitis     Poor historian     records obtained from nursing home records & his family    Visual impairment      Past Surgical History:   Procedure Laterality Date    AMPUTATION FOOT Left 10/18/2022    Procedure: PARTIAL FIRST RAY AMPUTATION LEFT;  Surgeon: Yeison Petty MD;  Location:  [facility];  Service: Orthopedics;  Laterality: Left;    AMPUTATION FOOT Left 12/5/2022    Procedure: Transmetatarsal of Left Foot;  Surgeon: Yeison Petty MD;  Location:  [facility];  Service: Orthopedics;  Laterality: Left;    ENDOSCOPY N/A 3/14/2024    Procedure: ESOPHAGOGASTRODUODENOSCOPY WITH JEJUNAL TUBE INSERTION AT BEDSIDE;  Surgeon: Brunner, Mark I, MD;  Location:  [facility];  Service: Gastroenterology;  Laterality: N/A;    ENDOSCOPY W/ PEG TUBE PLACEMENT N/A 4/1/2024    Procedure: ESOPHAGOGASTRODUODENOSCOPY WITH PERCUTANEOUS ENDOSCOPIC GASTROSTOMY TUBE INSERTION;  Surgeon: Brunner, Mark I, MD;  Location:  [facility];  Service: Gastroenterology;  Laterality: N/A;  EGD with PEG placement.  Secured at 3.5 cm    EYE SURGERY        General Information       Row Name 11/08/24 1019          OT Time and Intention    Document Type evaluation (P)   -     Mode of Treatment occupational therapy (P)   -       Row Name 11/08/24 1019          General Information    Patient Profile Reviewed yes (P)   -     Prior Level of Function -- (P)   Pt is poor historian. Per chart review, pt needs assist with ADLs, and prior to last admit  pt coukd sit EOB and transfer w/ pole and assist of staff at SNF.  -     Existing Precautions/Restrictions fall (P)   H/O L transmetatarsal amputation, has PEG, blind (sees shadows)  -     Barriers to Rehab medically complex;previous functional deficit;cognitive status;visual deficit;hearing deficit (P)   -       Row Name 11/08/24 1019          Living Environment    People in Home facility resident (P)   -       Row Name 11/08/24 1019          Home Main Entrance    Number of Stairs, Main Entrance none (P)   -       Row Name 11/08/24 1019          Stairs Within Home, Primary    Number of Stairs, Within Home, Primary none (P)   -       Row Name 11/08/24 1019          Cognition    Orientation Status (Cognition) unable/difficult to assess (P)   Pt able to nod yes and confirm when asked name and birth month  -[facility] Name 11/08/24 1019          Safety Issues/Impairments Affecting Functional Mobility    Safety Issues Affecting Function (Mobility) ability to follow commands;awareness of need for assistance;insight into deficits/self-awareness;safety precaution awareness;safety precautions follow-through/compliance;sequencing abilities (P)   -     Impairments Affecting Function (Mobility) balance;cognition;endurance/activity tolerance;postural/trunk control;range of motion (ROM);strength;visual/perceptual (P)   -     Cognitive Impairments, Mobility Safety/Performance attention;awareness, need for assistance;insight into deficits/self-awareness;safety precaution awareness;safety precaution follow-through;sequencing abilities (P)   -               User Key  (r) = Recorded By, (t) = Taken By, (c) = Cosigned By      Initials Name Provider Type     Lissy Gillette OT Student OT Student                     Mobility/ADL's       Row Name 11/08/24 1028          Bed Mobility    Bed Mobility rolling left;rolling right (P)   -     Rolling Left [place] (Bed Mobility) dependent (less than 25% patient  effort);2 person assist;verbal cues;nonverbal cues (demo/gesture) (P)   -     Rolling Right [place] (Bed Mobility) dependent (less than 25% patient effort);2 person assist;verbal cues;nonverbal cues (demo/gesture) (P)   -     Bed Mobility, Safety Issues cognitive deficits limit understanding;decreased use of arms for pushing/pulling;decreased use of legs for bridging/pushing;impaired trunk control for bed mobility (P)   -     Assistive Device (Bed Mobility) bed rails (P)   -     Comment, (Bed Mobility) Rolling L and R for sling placement (P)   -       Row Name 11/08/24 1028          Transfers    Transfers bed-chair transfer (P)   -       Row Name 11/08/24 1028          Bed-Chair Transfer    Bed-Chair [place] (Transfers) dependent (less than 25% patient effort);2 person assist (P)   -     Assistive Device (Bed-Chair Transfers) lift device (P)   -       Row Name 11/08/24 1028          Functional Mobility    Patient was able to Ambulate no, other medical factors prevent ambulation (P)   -     Reason Patient was unable to Ambulate Excessive Weakness;Cognitive Deficit/Severe Dementia (P)   -       Row Name 11/08/24 1028          Activities of Daily Living    BADL Assessment/Intervention grooming;lower body dressing (P)   -[facility] Name 11/08/24 1028          Grooming Assessment/Training    [place] Level (Grooming) wash face, hands;dependent (less than 25% patient effort) (P)   -     Position (Grooming) supported sitting (P)   -[facility] Name 11/08/24 1028          Lower Body Dressing Assessment/Training    [place] Level (Lower Body Dressing) don;socks;dependent (less than 25% patient effort) (P)   -     Position (Lower Body Dressing) supine (P)   -               User Key  (r) = Recorded By, (t) = Taken By, (c) = Cosigned By      Initials Name Provider Type     Lissy Gillette OT Student OT Student                   Obj/Interventions       Row Name 11/08/24 1031           Sensory Assessment (Somatosensory)    Sensory Assessment (Somatosensory) unable/difficult to assess (P)   -[facility] Name 11/08/24 1031          Vision Assessment/Intervention    Visual Impairment/Limitations unable/difficult to assess (P)   -[facility] Name 11/08/24 1031          Range of Motion Comprehensive    General Range of Motion upper extremity range of motion deficits identified (P)   -     Comment, General Range of Motion ROM tested limited d/t cognitive status (P)   -[facility] Name 11/08/24 1031          Strength Comprehensive (MMT)    General Manual Muscle Testing (MMT) Assessment upper extremity strength deficits identified (P)   -     Comment, General Manual Muscle Testing (MMT) Assessment MMT limited d/t cognition, BUE MMT grossly 3/5 (P)   -[facility] Name 11/08/24 1031          Balance    Balance Assessment sitting static balance;sitting dynamic balance (P)   -     Static Sitting Balance maximum assist;2-person assist (P)   -     Dynamic Sitting Balance 2-person assist;dependent (P)   -     Position, Sitting Balance unsupported;sitting in chair (P)   -     Balance Interventions sitting;static;dynamic;occupation based/functional task (P)   -               User Key  (r) = Recorded By, (t) = Taken By, (c) = Cosigned By      Initials Name Provider Type     Lissy Gillette, OT Student OT Student                   Goals/Plan       Row Name 11/08/24 1038          Bed Mobility Goal 1 (OT)    Activity/Assistive Device (Bed Mobility Goal 1, OT) sit to supine/supine to sit (P)   -     [place] Level/Cues Needed (Bed Mobility Goal 1, OT) maximum assist (25-49% patient effort) (P)   -     Time Frame (Bed Mobility Goal 1, OT) short term goal (STG);3 days (P)   -     Progress/Outcomes (Bed Mobility Goal 1, OT) new goal (P)   -BH       Row Name 11/08/24 1038          Transfer Goal 1 (OT)    Activity/Assistive Device (Transfer Goal 1, OT) bed-to-chair/chair-to-bed (P)    -     [place] Level/Cues Needed (Transfer Goal 1, OT) maximum assist (25-49% patient effort) (P)   -     Time Frame (Transfer Goal 1, OT) short term goal (STG);3 days (P)   -     Progress/Outcome (Transfer Goal 1, OT) new goal (P)   -       Row Name 11/08/24 1038          Dressing Goal 1 (OT)    Activity/Device (Dressing Goal 1, OT) lower body dressing (P)   -     [place]/Cues Needed (Dressing Goal 1, OT) moderate assist (50-74% patient effort) (P)   -     Time Frame (Dressing Goal 1, OT) long term goal (LTG);5 days (P)   -     Progress/Outcome (Dressing Goal 1, OT) new goal (P)   -       Row Name 11/08/24 1038          Therapy Assessment/Plan (OT)    Planned Therapy Interventions (OT) activity tolerance training;adaptive equipment training;BADL retraining;functional balance retraining;occupation/activity based interventions;passive ROM/stretching;patient/caregiver education/training;ROM/therapeutic exercise;strengthening exercise;transfer/mobility retraining (P)   -               User Key  (r) = Recorded By, (t) = Taken By, (c) = Cosigned By      Initials Name Provider Type     Lissy Gillette OT Student OT Student                   Clinical Impression       Row Name 11/08/24 1035          Pain Assessment    Additional Documentation Pain Scale: FACES Pre/Post-Treatment (Group) (P)   -[facility] Name 11/08/24 1035          Pain Scale: FACES Pre/Post-Treatment    Pain: FACES Scale, Pretreatment 0-->no hurt (P)   -     Posttreatment Pain Rating 0-->no hurt (P)   -       Row Name 11/08/24 1035          Plan of Care Review    Plan of Care Reviewed With patient (P)   -     Progress no change (P)   -     Outcome Evaluation OT eval complete. Pt presenting below functional baseline w/ bed mobility, transfers, and ADL tasks d/t generalized weakness, balance and cognitive deficits, and decreased activity tolerance. Pt was dep lift for bed > chair transfer this date. Continue IP OT  to address current deficits. Recommend SNF at d/c. (P)   -       Row Name 11/08/24 1035          Therapy Assessment/Plan (OT)    Patient/Family Therapy Goal Statement (OT) Return to PLOF (P)   -     Rehab Potential (OT) good (P)   -     Criteria for Skilled Therapeutic Interventions Met (OT) yes;meets criteria;skilled treatment is necessary (P)   -     Predicted Duration of Therapy Intervention (OT) 5 days (P)   -       Row Name 11/08/24 1035          Therapy Plan Review/Discharge Plan (OT)    Anticipated Discharge Disposition (OT) skilled nursing facility (P)   -       Row Name 11/08/24 1035          Vital Signs    Pre Systolic BP Rehab 163 (P)   -     Pre Treatment Diastolic BP 65 (P)   -     Post Systolic BP Rehab 143 (P)   -     Post Treatment Diastolic BP 64 (P)   -     Pretreatment Heart Rate (beats/min) 63 (P)   -     Posttreatment Heart Rate (beats/min) 62 (P)   -     O2 Delivery Pre Treatment room air (P)   -     O2 Delivery Intra Treatment room air (P)   -     Post SpO2 (%) 96 (P)   -     O2 Delivery Post Treatment room air (P)   -     Pre Patient Position Supine (P)   -     Intra Patient Position Sitting (P)   -     Post Patient Position Sitting (P)   -       Row Name 11/08/24 1035          Positioning and Restraints    Pre-Treatment Position in bed (P)   -     Post Treatment Position chair (P)   -     In Chair reclined;call light within reach;encouraged to call for assist;exit alarm on;RUE elevated;legs elevated;waffle cushion;on mechanical lift sling (P)   -               User Key  (r) = Recorded By, (t) = Taken By, (c) = Cosigned By      Initials Name Provider Type     Lissy Gillette, OT Student OT Student                   Outcome Measures       Row Name 11/08/24 1039          How much help from another is currently needed...    Putting on and taking off regular lower body clothing? 1 (P)   -     Bathing (including washing, rinsing, and drying) 1 (P)    -     Toileting (which includes using toilet bed pan or urinal) 1 (P)   -     Putting on and taking off regular upper body clothing 1 (P)   -     Taking care of personal grooming (such as brushing teeth) 1 (P)   -     Eating meals 1 (P)   -     AM-PAC 6 Clicks Score (OT) 6 (P)   -       Row Name 11/08/24 1039          Functional Assessment    Outcome Measure Options AM-PAC 6 Clicks Daily Activity (OT) (P)   -               User Key  (r) = Recorded By, (t) = Taken By, (c) = Cosigned By      Initials Name Provider Type     Lissy Gillette, OT Student OT Student                    Occupational Therapy Education       Title: PT OT SLP Therapies (In Progress)       Topic: Occupational Therapy (In Progress)       Point: ADL training (Done)       Description:   Instruct learner(s) on proper safety adaptation and remediation techniques during self care or transfers.   Instruct in proper use of assistive devices.                  Learning Progress Summary            Patient Acceptance, E, VU by  at 11/8/2024 1040                      Point: Home exercise program (Not Started)       Description:   Instruct learner(s) on appropriate technique for monitoring, assisting and/or progressing therapeutic exercises/activities.                  Learner Progress:  Not documented in this visit.              Point: Precautions (Done)       Description:   Instruct learner(s) on prescribed precautions during self-care and functional transfers.                  Learning Progress Summary            Patient Acceptance, E, VU by  at 11/8/2024 1040                      Point: Body mechanics (Done)       Description:   Instruct learner(s) on proper positioning and spine alignment during self-care, functional mobility activities and/or exercises.                  Learning Progress Summary            Patient Acceptance, E, VU by  at 11/8/2024 1040                                      User Key       Initials Effective  Dates Name Provider Type Discipline     08/08/24 -  Lissy Gillette, OT Student OT Student OT                  OT Recommendation and Plan  Planned Therapy Interventions (OT): (P) activity tolerance training, adaptive equipment training, BADL retraining, functional balance retraining, occupation/activity based interventions, passive ROM/stretching, patient/caregiver education/training, ROM/therapeutic exercise, strengthening exercise, transfer/mobility retraining  Plan of Care Review  Plan of Care Reviewed With: (P) patient  Progress: (P) no change  Outcome Evaluation: (P) OT eval complete. Pt presenting below functional baseline w/ bed mobility, transfers, and ADL tasks d/t generalized weakness, balance and cognitive deficits, and decreased activity tolerance. Pt was dep lift for bed > chair transfer this date. Continue IP OT to address current deficits. Recommend SNF at d/c.     Time Calculation:   Evaluation Complexity (OT)  Review Occupational Profile/Medical/Therapy History Complexity: (P) brief/low complexity  Assessment, Occupational Performance/Identification of Deficit Complexity: (P) 3-5 performance deficits  Clinical Decision Making Complexity (OT): (P) problem focused assessment/low complexity  Overall Complexity of Evaluation (OT): (P) low complexity     Time Calculation- OT       Row Name 11/08/24 1041             Time Calculation-     OT Start Time 0942 (P)   -      OT Received On 11/08/24 (P)   -      OT Goal Re-Cert Due Date 11/18/24 (P)   -         Timed Charges    44146 -  Therapeutic Activity Minutes 10 (P)   -         Untimed Charges    OT Eval/Re-eval Minutes 46 (P)   -         Total Minutes    Timed Charges Total Minutes 10 (P)   -      Untimed Charges Total Minutes 46 (P)   -       Total Minutes 56 (P)   -                User Key  (r) = Recorded By, (t) = Taken By, (c) = Cosigned By      Initials Name Provider Type     Lissy Gillette, OT Student OT Student                   Therapy Charges for Today       Code Description Service Date Service Provider Modifiers Qty    [id number]  OT THERAPEUTIC ACT EA 15 MIN 11/8/2024 Lissy Gillette, OT Student RUBY GOVEA 1    [id number]  OT EVAL LOW COMPLEXITY 4 11/8/2024 Lissy Gillette, OT Student RUBY GOVEA 1                 Lissy Gillette OT Student  11/8/2024

## 2024-11-08 NOTE — H&P
"    [facility] Medicine Services  HISTORY AND PHYSICAL    Patient Name: Omkar Nava  : 1955  MRN: [id number]  Primary Care Physician: Alberto Dye MD  Date of admission: 2024    Subjective   Subjective     Chief Complaint:  Generalized weakness    HPI:  Omkar Nava is a 69 y.o. male with a past medical history significant for hypertension, diabetes mellitus type 2, CKD, vascular dementia, dysphagia and chronic iron deficiency anemia presents to the ED accompanied by daughter due to generalized weakness.  Daughter at bedside states she had visited him in the nursing facility last night and he \"was not his self.\"  Patient is currently oriented x3.  He denies any recent fever, chills, cough, shortness of breath, chest pain, dysuria or diarrhea.  He does report some nausea and decreased appetite.    Patient was recently admitted to [facility] from 10/20/2024 to 10/31/2024 secondary to UTI and rhinovirus.  He was initially on amoxicillin but antibiotics were broadened on 1026 due to postviral pneumonia versus HAP.        Review of Systems   Constitutional:  Positive for appetite change and fatigue. Negative for activity change, chills, diaphoresis and fever.   HENT: Negative.     Eyes:  Negative for photophobia and visual disturbance.   Respiratory:  Negative for cough and shortness of breath.    Cardiovascular:  Negative for chest pain, palpitations and leg swelling.   Gastrointestinal:  Positive for nausea. Negative for abdominal distention, abdominal pain, constipation, diarrhea and vomiting.   Genitourinary: Negative.    Musculoskeletal:  Negative for back pain, neck pain and neck stiffness.   Skin: Negative.    Neurological:  Positive for weakness. Negative for dizziness, speech difficulty, light-headedness, numbness and headaches.   Psychiatric/Behavioral: Negative.                  Personal History     Past Medical History:   Diagnosis Date    Anemia     " "Dementia     Diabetes mellitus     Dysphagia     GERD (gastroesophageal reflux disease)     History of alcohol abuse     History of cocaine use     History of marijuana use     Hypertension     Osteomyelitis     Poor historian     records obtained from nursing home records & his family    Visual impairment              Past Surgical History:   Procedure Laterality Date    AMPUTATION FOOT Left 10/18/2022    Procedure: PARTIAL FIRST RAY AMPUTATION LEFT;  Surgeon: Yeison Petty MD;  Location:  [facility];  Service: Orthopedics;  Laterality: Left;    AMPUTATION FOOT Left 12/5/2022    Procedure: Transmetatarsal of Left Foot;  Surgeon: Yeison Petty MD;  Location:  [facility];  Service: Orthopedics;  Laterality: Left;    ENDOSCOPY N/A 3/14/2024    Procedure: ESOPHAGOGASTRODUODENOSCOPY WITH JEJUNAL TUBE INSERTION AT BEDSIDE;  Surgeon: Brunner, Mark I, MD;  Location:  [facility];  Service: Gastroenterology;  Laterality: N/A;    ENDOSCOPY W/ PEG TUBE PLACEMENT N/A 4/1/2024    Procedure: ESOPHAGOGASTRODUODENOSCOPY WITH PERCUTANEOUS ENDOSCOPIC GASTROSTOMY TUBE INSERTION;  Surgeon: Brunner, Mark I, MD;  Location:  [facility];  Service: Gastroenterology;  Laterality: N/A;  EGD with PEG placement.  Secured at 3.5 cm    EYE SURGERY         Family History:  family history includes Diabetes in his brother, brother, father, maternal grandfather, maternal grandmother, mother, and sister; Hypertension in his brother, brother, father, and mother.     Social History:  reports that he quit smoking about 7 years ago. His smoking use included cigarettes. He started smoking about 47 years ago. He has a 40 pack-year smoking history. He has been exposed to tobacco smoke. He has never used smokeless tobacco. He reports that he does not currently use alcohol. He reports current drug use. Drugs: Marijuana and \"Crack\" cocaine.  Social History     Social History Narrative    Caffeine 0-1 servings per day    Patient lives at home . "       Medications:  ARIPiprazole, FLUoxetine, PALLIATIVE CARE ORAL RINSE ([facility]), QUEtiapine, Valproic Acid, Vitamin D3, acetaminophen, amLODIPine, brimonidine, carvedilol, cloNIDine, hydrALAZINE, hydrOXYzine, ipratropium-albuterol, lansoprazole, metFORMIN, polyethylene glycol, sennosides, terazosin, timolol, and torsemide    No Known Allergies    Objective   Objective     Vital Signs:   Heart Rate:  [47-48] 47  Resp:  [19] 19  BP: (113-134)/(59-69) 134/59    Physical Exam  Vitals and nursing note reviewed.   Constitutional:       General: He is not in acute distress.     Appearance: He is not ill-appearing, toxic-appearing or diaphoretic.   HENT:      Head: Normocephalic and atraumatic.      Nose: Nose normal.      Mouth/Throat:      Mouth: Mucous membranes are dry.      Pharynx: Oropharynx is clear.   Eyes:      Extraocular Movements: Extraocular movements intact.      Conjunctiva/sclera: Conjunctivae normal.   Cardiovascular:      Rate and Rhythm: Regular rhythm. Bradycardia present.      Pulses: Normal pulses.      Heart sounds: Normal heart sounds.   Pulmonary:      Effort: Pulmonary effort is normal.      Breath sounds: Normal breath sounds.   Abdominal:      General: Bowel sounds are normal. There is no distension.      Palpations: Abdomen is soft. There is no mass.      Tenderness: There is no abdominal tenderness. There is no right CVA tenderness, left CVA tenderness, guarding or rebound.      Hernia: No hernia is present.   Musculoskeletal:         General: No swelling, tenderness, deformity or signs of injury. Normal range of motion.      Cervical back: Normal range of motion and neck supple.      Right lower leg: No edema.      Left lower leg: No edema.   Skin:     General: Skin is warm and dry.   Neurological:      Mental Status: He is alert and oriented to person, place, and time. Mental status is at baseline.   Psychiatric:         Mood and Affect: Mood normal.         Behavior: Behavior normal.             Result Review:  I have personally reviewed the results from the time of this admission to 11/7/2024 19:57 EST and agree with these findings:  [x]  Laboratory list / accordion  [x]  Microbiology  [x]  Radiology  [x]  EKG/Telemetry   []  Cardiology/Vascular   []  Pathology  []  Old records  []  Other:  Most notable findings include:     LAB RESULTS:      Lab 11/07/24  1618   WBC 5.81   HEMOGLOBIN 7.9*   HEMATOCRIT 25.1*   PLATELETS 283   NEUTROS ABS 3.48   IMMATURE GRANS (ABS) 0.02   LYMPHS ABS 1.62   MONOS ABS 0.55   EOS ABS 0.12   MCV 96.2         Lab 11/07/24  1729 11/07/24  1618   SODIUM  --  133*   POTASSIUM 6.3* 6.3*   CHLORIDE  --  98   CO2  --  26.0   ANION GAP  --  9.0   BUN  --  42*   CREATININE  --  1.39*   EGFR  --  55.6*   GLUCOSE  --  104*   CALCIUM  --  9.9   MAGNESIUM  --  2.2   TSH  --  4.100         Lab 11/07/24  1618   TOTAL PROTEIN 7.2   ALBUMIN 3.8   GLOBULIN 3.4   ALT (SGPT) 20   AST (SGOT) 14   BILIRUBIN <0.2   ALK PHOS 85         Lab 11/07/24  1729 11/07/24  1618   HSTROP T 80* 84*                 Brief Urine Lab Results  (Last result in the past 365 days)        Color   Clarity   Blood   Leuk Est   Nitrite   Protein   CREAT   Urine HCG        11/07/24 1806 Yellow   Clear   Negative   Negative   Negative   Negative                 Microbiology Results (last 10 days)       Procedure Component Value - Date/Time    COVID PRE-OP / PRE-PROCEDURE SCREENING ORDER (NO ISOLATION) - Swab, Nasopharynx [[id number]]  (Normal) Collected: 11/07/24 1729    Lab Status: Final result Specimen: Swab from Nasopharynx Updated: 11/07/24 1803    Narrative:      The following orders were created for panel order COVID PRE-OP / PRE-PROCEDURE SCREENING ORDER (NO ISOLATION) - Swab, Nasopharynx.  Procedure                               Abnormality         Status                     ---------                               -----------         ------                     COVID-19 and FLU A/B PCR...[[id number]]  Normal               Final result                 Please view results for these tests on the individual orders.    COVID-19 and FLU A/B PCR, 1 HR TAT - Swab, Nasopharynx [[id number]]  (Normal) Collected: 11/07/24 1729    Lab Status: Final result Specimen: Swab from Nasopharynx Updated: 11/07/24 1803     COVID19 Not Detected     Influenza A PCR Not Detected     Influenza B PCR Not Detected    Narrative:      Fact sheet for providers: https://www.fda.gov/media/875367/download    Fact sheet for patients: https://www.fda.gov/media/655820/download    Test performed by PCR.            CT Head Without Contrast    Result Date: 11/7/2024  CT HEAD WO CONTRAST Date of Exam: 11/7/2024 4:51 PM EST Indication: ams. Comparison: Noncontrast CT of the head performed on September 11, 2024 Technique: Axial CT images were obtained of the head without contrast administration.  Automated exposure control and iterative construction methods were used. Findings: There is no evidence of hemorrhage. There is no mass effect or midline shift. Age-related involutional changes are visualized. Bilateral periventricular white matter hypodensities are visualized compatible with changes of chronic microvessel ischemia. There is no extra-axial collections. Ventricles are normal in size and configuration for patient's stated age. Posterior fossa is within normal limits. Calvarium and skull base appear intact. Visualized sinuses show no air fluid levels. The mastoid air cells are clear. Postsurgical changes of the right orbit are visualized. Left ovary is within normal limits.     Impression: Impression: No acute intracranial process evident. Age-related involutional changes and findings compatible with changes of chronic microvascular ischemia. Electronically Signed: Quentin Osorio MD  11/7/2024 5:06 PM EST  Workstation ID: [id number]    XR Chest 1 View    Result Date: 11/7/2024  XR CHEST 1 VW Date of Exam: 11/7/2024 4:19 PM EST Indication: Weak/Dizzy/AMS triage  protocol Comparison: Chest radiograph performed on October 26, 2024 Findings: No focal consolidation or effusion.  There is no evidence of pneumothorax.  The pulmonary vasculature appears within normal limits.  The cardiac and mediastinal silhouette appear unremarkable.  No acute osseous abnormality identified.     Impression: Impression: No radiographic evidence of acute chest process. Electronically Signed: Quentin Osorio MD  11/7/2024 4:33 PM EST  Workstation ID: [id number]     Results for orders placed during the hospital encounter of 01/15/24    Adult Transthoracic Echo Complete With Contrast if Necessary Per Protocol    Interpretation Summary    Left ventricular ejection fraction appears to be 56 - 60%.    Left ventricular wall thickness is consistent with hypertrophy.    Estimated right ventricular systolic pressure from tricuspid regurgitation is mildly elevated (35-45 mmHg).    There is a small (1-2cm) pericardial effusion.    No evidence for pericardial tamponade      Assessment & Plan   Assessment & Plan       JAYY (acute kidney injury)    Type 2 diabetes mellitus    Essential hypertension    Anemia of chronic disease    Dementia    Severe vascular dementia without behavioral disturbance, psychotic disturbance, mood disturbance, or anxiety    Generalized weakness    Hyperkalemia      69 year old male nursing home resident presents to the ED due to generalized weakness/fatigue.  Nursing home reported low BP today therefore sent to the ED for further evaluation.     1) Generalized weakness     -likely multifactorial in setting of JAYY, Hyperkalemia, anemia   -consult PT/OT/CM in am   -fall precautions   -up with assistance   -UA negative  -Respiratory PCR negative   -CXR negative  -CT head negative     2) JAYY   -received 2L normal saline in the ED  -BP stable   -repeat labs in am   -hold nephrotoxic agents  -check urine studies     3) Hyperkalemia  -received lokelma, calcium gluconate, insulin, D50W in the  ED  -glucose checks q 1 hour  -telemetry monitoring   -repeat potassium at midnight     4) Anemia of chronic disease   -H&H stable   -check anemia panel   -type and screen   -trend H&H overnight     5) Chronic aspiration   -barium swallow 10/29: puree, thin liquids.  Continue with PEG tube feeds     6) Diabetes mellitus type 2  -Hgb A1c 5.6  -fingersticks q1 hour for now     7) HFpEF      Essential hypertension   -last echo EF 56-60%  -hold carvedilol (HR 47)  -BP reportedly low at Nursing facility, will hold BP meds tonight   -hold diuretics   -strict I &O's   -daily weight     8) Dementia with agitation       ? Seziure disorder   -on seroquel, depakote,abilify and prozac       DVT prophylaxis:  scds    CODE STATUS:  Full Code        Expected Discharge  TBD     This note has been completed as part of a split-shared workflow.     Signature: Electronically signed by NARGIS Valdez, 11/07/24, 8:15 PM EST.

## 2024-11-08 NOTE — PLAN OF CARE
Goal Outcome Evaluation:  Plan of Care Reviewed With: patient           Outcome Evaluation: PT eval completed. Pt presents below baseline function d/t lethargy, generalized weakness, balance deficits, decreased activity tolerance, and decreased command following. Pt required depA x2 for bed mobility, t/f to chair, and STS from chair w/ BUE support and B knee blocking. Pt would benefit from skilled IP PT. Recommend SNF at d/c.    Anticipated Discharge Disposition (PT): skilled nursing facility

## 2024-11-08 NOTE — PLAN OF CARE
Goal Outcome Evaluation:  Plan of Care Reviewed With: (P) patient        Progress: (P) no change  Outcome Evaluation: (P) OT eval complete. Pt presenting below functional baseline w/ bed mobility, transfers, and ADL tasks d/t generalized weakness, balance and cognitive deficits, and decreased activity tolerance. Pt was dep lift for bed > chair transfer this date. Continue IP OT to address current deficits. Recommend SNF at d/c.    Anticipated Discharge Disposition (OT): (P) skilled nursing facility

## 2024-11-08 NOTE — CASE MANAGEMENT/SOCIAL WORK
Discharge Planning Assessment  [facility]     Patient Name: Omkar Nava  MRN: [id number]  Today's Date: 11/8/2024    Admit Date: 11/7/2024    Plan: [facility]   Discharge Needs Assessment       Row Name 11/08/24 1113       Living Environment    People in Home facility resident    Name(s) of People in Home longterm care at [facility]    Able to Return to Prior Arrangements yes       Resource/Environmental Concerns    Transportation Concerns none       Transition Planning    Patient/Family Anticipates Transition to long-term care facility       Discharge Needs Assessment    Provided Post Acute Provider List? N/A    Provided Post Acute Provider Quality & Resource List? N/A                   Discharge Plan       Row Name 11/08/24 1114       Plan    Plan [facility]    Plan Comments MSW called and confirmed with Mariela with [facility] that pt is longterm and can return when ready. MSW also spoke with pt's daughter, Idalia. Idalia reports that she is agreeable for pt to return to [facility] but she would like referrals to be sent to the other facilities in [place] to see if pt has another option at another facility for longterm. Once more notes in, MSW will send additional referrals. Plan at this time is return to [facility] when ready via ambulance.    Final Discharge Disposition Code 64 - nursing facility under Medicaid                  Continued Care and Services - Admitted Since 11/7/2024    No active coordination exists for this encounter.       Selected Continued Care - Prior Encounters Includes continued care and service providers with selected services from prior encounters from 8/9/2024 to 11/8/2024      Discharged on 10/31/2024 Admission date: 10/20/2024 - Discharge disposition: Skilled Nursing Facility (DC - External)      Destination       Service Provider Services Address Phone Fax Patient Preferred    [facility] [address] , [facility] [address]  [phone] --                      Discharged on 9/18/2024 Admission date: 9/11/2024 - Discharge disposition: Skilled Nursing Facility (DC - External)      Destination       Service Provider Services Address Phone Fax Patient Preferred    [facility] [address] , [facility] [address] [phone] --                             Demographic Summary       Row Name 11/08/24 1112       General Information    Reason for Consult discharge planning                   Functional Status       Row Name 11/08/24 1112       Functional Status, IADL    Medications assistive equipment and person    Meal Preparation assistive equipment and person    Housekeeping assistive equipment and person    Laundry assistive equipment and person    Shopping assistive equipment and person                   Psychosocial    No documentation.                  Abuse/Neglect    No documentation.                  Legal    No documentation.                  Substance Abuse    No documentation.                  Patient Forms    No documentation.                     LUCINDA Maya

## 2024-11-08 NOTE — THERAPY EVALUATION
Patient Name: Omkar Nava  : 1955    MRN: [id number]                              Today's Date: 2024       Admit Date: 2024    Visit Dx:     ICD-10-CM ICD-9-CM   1. Acute kidney injury  N17.9 584.9   2. Hyperkalemia  E87.5 276.7   3. Generalized weakness  R53.1 780.79   4. History of diabetes mellitus  Z86.39 V12.29   5. Vascular dementia, unspecified dementia severity, unspecified whether behavioral, psychotic, or mood disturbance or anxiety  F01.50 290.40   6. 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, without long-term current use of insulin    Acute osteomyelitis of left foot    Type 2 diabetes mellitus    Essential hypertension    Psychotic disorder    Neurocognitive disorder    S/P transmetatarsal amputation of foot, left    Abscess of left foot    Anemia of chronic disease    Aspiration pneumonia    Cervical spine pain    Chronic kidney disease    Closed head injury without loss of consciousness    Dementia    Dental cavities    Diarrhea of presumed infectious origin    Displaced fracture of proximal phalanx of left great toe, initial encounter for open fracture    Dysphagia    Erectile dysfunction    Generalized muscle weakness    Hallucinations    Hypertensive heart and chronic kidney disease 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 failure, with stage 1 through stage 4 chronic kidney disease, or unspecified chronic kidney disease    Insomnia due to medical condition    Iron deficiency anemia    Laceration without foreign body, left foot, subsequent encounter    Personal history of Methicillin resistant Staphylococcus aureus infection    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    Protein calorie malnutrition    Simple chronic bronchitis    Tobacco use    Type 2 diabetes mellitus with diabetic neuropathy, unspecified    Type 2 diabetes mellitus with  diabetic chronic kidney disease    Acute type 1 respiratory failure    Severe vascular dementia without behavioral disturbance, psychotic disturbance, mood disturbance, or anxiety    JAYY (acute kidney injury)    Generalized weakness    Hyperkalemia     Past Medical History:   Diagnosis Date    Anemia     Dementia     Diabetes mellitus     Dysphagia     GERD (gastroesophageal reflux disease)     History of alcohol abuse     History of cocaine use     History of marijuana use     Hypertension     Osteomyelitis     Poor historian     records obtained from nursing home records & his family    Visual impairment      Past Surgical History:   Procedure Laterality Date    AMPUTATION FOOT Left 10/18/2022    Procedure: PARTIAL FIRST RAY AMPUTATION LEFT;  Surgeon: Yeison Petty MD;  Location:  [facility];  Service: Orthopedics;  Laterality: Left;    AMPUTATION FOOT Left 12/5/2022    Procedure: Transmetatarsal of Left Foot;  Surgeon: Yeison Petty MD;  Location:  [facility];  Service: Orthopedics;  Laterality: Left;    ENDOSCOPY N/A 3/14/2024    Procedure: ESOPHAGOGASTRODUODENOSCOPY WITH JEJUNAL TUBE INSERTION AT BEDSIDE;  Surgeon: Brunner, Mark I, MD;  Location:  [facility];  Service: Gastroenterology;  Laterality: N/A;    ENDOSCOPY W/ PEG TUBE PLACEMENT N/A 4/1/2024    Procedure: ESOPHAGOGASTRODUODENOSCOPY WITH PERCUTANEOUS ENDOSCOPIC GASTROSTOMY TUBE INSERTION;  Surgeon: Brunner, Mark I, MD;  Location:  [facility];  Service: Gastroenterology;  Laterality: N/A;  EGD with PEG placement.  Secured at 3.5 cm    EYE SURGERY        General Information       Row Name 11/08/24 1040          Physical Therapy Time and Intention    Document Type evaluation  -     Mode of Treatment physical therapy  -       Row Name 11/08/24 1040          General Information    Patient Profile Reviewed yes  -     Prior Level of Function --   Pt is a limited historian. Per chart review, pt requires assist w/ ADLs and was able to pivot to   w/ use of pole and assist  -     Existing Precautions/Restrictions fall;other (see comments)  PEG, Hx L transmetatarsal amp, Blind (sees shadows)  -     Barriers to Rehab medically complex;previous functional deficit;cognitive status;visual deficit  -       Row Name 11/08/24 1040          Living Environment    People in Home facility resident  Per chart review, pt lives at [facility]  -       Row Name 11/08/24 1040          Home Main Entrance    Number of Stairs, Main Entrance none  -       Row Name 11/08/24 1040          Stairs Within Home, Primary    Number of Stairs, Within Home, Primary none  -       Row Name 11/08/24 1040          Cognition    Orientation Status (Cognition) oriented to;person;disoriented to;place;situation;time  -       Row Name 11/08/24 1040          Safety Issues/Impairments Affecting Functional Mobility    Safety Issues Affecting Function (Mobility) awareness of need for assistance;insight into deficits/self-awareness;judgment;safety precaution awareness;safety precautions follow-through/compliance;sequencing abilities;ability to follow commands  -     Impairments Affecting Function (Mobility) balance;cognition;endurance/activity tolerance;grasp;range of motion (ROM);postural/trunk control;strength;visual/perceptual  -     Cognitive Impairments, Mobility Safety/Performance attention;awareness, need for assistance;insight into deficits/self-awareness;judgment;problem-solving/reasoning;safety precaution awareness;safety precaution follow-through;sequencing abilities  -     Comment, Safety Issues/Impairments (Mobility) Pt lethargic w/ minimal verbal communication (shakes head yes/no)  -               User Key  (r) = Recorded By, (t) = Taken By, (c) = Cosigned By      Initials Name Provider Type     Ami Smith, PT Physical Therapist                   Mobility       Row Name 11/08/24 1044          Bed Mobility    Bed Mobility rolling left;rolling right  -      Rolling Left [place] (Bed Mobility) dependent (less than 25% patient effort);verbal cues;nonverbal cues (demo/gesture)  -     Rolling Right [place] (Bed Mobility) dependent (less than 25% patient effort);2 person assist;verbal cues;nonverbal cues (demo/gesture)  -     Assistive Device (Bed Mobility) bed rails  -     Comment, (Bed Mobility) VCs/TCs for hand placement and sequencing. Rolling performed to place lift sling  -[facility] Name 11/08/24 1044          Transfers    Comment, (Transfers) DepA t/f to chair using mechanical lift. Pt sat edge of chair w/ BUE support w/ maxA that progressed to Melody. STS x1 from chair w/ BUE support and B knee blocking. Unable to achieve upright posture w/ increased hip and trunk flexion noted  -[facility] Name 11/08/24 1044          Bed-Chair Transfer    Bed-Chair [place] (Transfers) dependent (less than 25% patient effort);2 person assist  -     Assistive Device (Bed-Chair Transfers) lift device  -[facility] Name 11/08/24 1044          Sit-Stand Transfer    Sit-Stand [place] (Transfers) dependent (less than 25% patient effort);2 person assist;verbal cues;nonverbal cues (demo/gesture)  -     Comment, (Sit-Stand Transfer) STS from chair  -[facility] Name 11/08/24 1044          Gait/Stairs (Locomotion)    [place] Level (Gait) unable to assess  -     Patient was able to Ambulate no, other medical factors prevent ambulation  -     Reason Patient was unable to Ambulate Cognitive Deficit/Severe Dementia;Excessive Weakness  -               User Key  (r) = Recorded By, (t) = Taken By, (c) = Cosigned By      Initials Name Provider Type     Ami Smith PT Physical Therapist                   Obj/Interventions       Row Name 11/08/24 1047          Range of Motion Comprehensive    General Range of Motion bilateral lower extremity ROM WFL  -     Comment, General Range of Motion BLE PROM WFL  -       Row Name 11/08/24 1047           Strength Comprehensive (MMT)    General Manual Muscle Testing (MMT) Assessment lower extremity strength deficits identified  -     Comment, General Manual Muscle Testing (MMT) Assessment BLE MMT deferred d/t decreased command following. Observed, BLEs functionally 2/5  -       Row Name 11/08/24 1047          Balance    Balance Assessment sitting static balance;sitting dynamic balance;standing static balance  -     Static Sitting Balance minimal assist;verbal cues;non-verbal cues (demo/gesture)  -     Dynamic Sitting Balance maximum assist;verbal cues;non-verbal cues (demo/gesture)  -     Position, Sitting Balance unsupported;sitting in chair;supported  -     Static Standing Balance dependent;2-person assist;verbal cues  -     Position/Device Used, Standing Balance supported  -     Balance Interventions sitting;standing;sit to stand;supported;static  -     Comment, Balance R lean noted in chair. Pt sat edge of chair w/ BUE support w/ maxA that progressed to Melody. He was able to follow minimal commands for anterior weightshifting and required manual assist to correct  -[facility] Name 11/08/24 1047          Sensory Assessment (Somatosensory)    Sensory Assessment (Somatosensory) unable/difficult to assess  -               User Key  (r) = Recorded By, (t) = Taken By, (c) = Cosigned By      Initials Name Provider Type     Ami Smith, PT Physical Therapist                   Goals/Plan       [facility] Name 11/08/24 1053          Bed Mobility Goal 1 (PT)    Activity/Assistive Device (Bed Mobility Goal 1, PT) bed mobility activities, all  -     [place] Level/Cues Needed (Bed Mobility Goal 1, PT) maximum assist (25-49% patient effort)  -     Time Frame (Bed Mobility Goal 1, PT) short term goal (STG);5 days  -[facility] Name 11/08/24 1053          Transfer Goal 1 (PT)    Activity/Assistive Device (Transfer Goal 1, PT) sit-to-stand/stand-to-sit;bed-to-chair/chair-to-bed;wheelchair transfer   "-     [place] Level/Cues Needed (Transfer Goal 1, PT) moderate assist (50-74% patient effort)  -     Time Frame (Transfer Goal 1, PT) long term goal (LTG);10 days  -[facility] Name 11/08/24 1053          Balance Goal 1 (PT)    Activity/Assistive Device (Balance Goal) sitting static balance  -     [place] Level/Cues Needed (Balance Goal 1, PT) contact guard required  -     Time Frame (Balance Goal 1, PT) long-term goal (LTG);other (see comments)  10 days  -     Strategies/Barriers (Balance Goal 1, PT) Unsupported sitting for 3 mins  -       Row Name 11/08/24 1053          Therapy Assessment/Plan (PT)    Planned Therapy Interventions (PT) balance training;bed mobility training;gait training;home exercise program;neuromuscular re-education;patient/family education;postural re-education;ROM (range of motion);strengthening;stretching;motor coordination training;transfer training;wheelchair management/propulsion training  -               User Key  (r) = Recorded By, (t) = Taken By, (c) = Cosigned By      Initials Name Provider Type     Ami Smith, PT Physical Therapist                   Clinical Impression       Row Name 11/08/24 1050          Pain    Additional Documentation Pain Scale: FACES Pre/Post-Treatment (Group)  -[facility] Name 11/08/24 1050          Pain Scale: FACES Pre/Post-Treatment    Pain: FACES Scale, Pretreatment 0-->no hurt  -     Posttreatment Pain Rating 0-->no hurt  [facility]     Pre/Posttreatment Pain Comment Pt shook his head \"no\" when asked about pain at end of session  -[facility] Name 11/08/24 1050          Plan of Care Review    Plan of Care Reviewed With patient  -     Outcome Evaluation PT eval completed. Pt presents below baseline function d/t lethargy, generalized weakness, balance deficits, decreased activity tolerance, and decreased command following. Pt required depA x2 for bed mobility, t/f to chair, and STS from chair w/ BUE support and B knee " blocking. Pt would benefit from skilled IP PT. Recommend SNF at d/c.  -       Row Name 11/08/24 1050          Therapy Assessment/Plan (PT)    Rehab Potential (PT) limited  -     Criteria for Skilled Interventions Met (PT) yes;meets criteria;skilled treatment is necessary  -     Therapy Frequency (PT) daily  -     Predicted Duration of Therapy Intervention (PT) 10 days  -       Row Name 11/08/24 1050          Vital Signs    Pre Systolic BP Rehab 163  -LH     Pre Treatment Diastolic BP 64  -LH     Post Systolic BP Rehab 143  -LH     Post Treatment Diastolic BP 64  -LH     Posttreatment Heart Rate (beats/min) 61  -LH     Pre SpO2 (%) 96  -LH     O2 Delivery Pre Treatment room air  -     O2 Delivery Intra Treatment room air  -     O2 Delivery Post Treatment room air  -     Pre Patient Position Supine  -     Post Patient Position Sitting  -[facility] Name 11/08/24 1050          Positioning and Restraints    Pre-Treatment Position in bed  -     Post Treatment Position chair  -     In Chair notified nsg;reclined;sitting;call light within reach;encouraged to call for assist;exit alarm on;waffle cushion;on mechanical lift sling;legs elevated;heels elevated;RUE elevated  -               User Key  (r) = Recorded By, (t) = Taken By, (c) = Cosigned By      Initials Name Provider Type     Ami Smith, PT Physical Therapist                   Outcome Measures       [facility] Name 11/08/24 1126          How much help from another person do you currently need...    Turning from your back to your side while in flat bed without using bedrails? 1  -LH     Moving from lying on back to sitting on the side of a flat bed without bedrails? 1  -LH     Moving to and from a bed to a chair (including a wheelchair)? 1  -LH     Standing up from a chair using your arms (e.g., wheelchair, bedside chair)? 1  -LH     Climbing 3-5 steps with a railing? 1  -LH     To walk in hospital room? 1  -     AM-PAC 6 Clicks Score (PT)  6  -     Highest Level of Mobility Goal 2 --> Bed activities/dependent transfer  -       Row Name 11/08/24 1126 11/08/24 1039       Functional Assessment    Outcome Measure Options AM-PAC 6 Clicks Basic Mobility (PT)  - AM-PAC 6 Clicks Daily Activity (OT)  -MR (r) BH (t) MR (c)              User Key  (r) = Recorded By, (t) = Taken By, (c) = Cosigned By      Initials Name Provider Type    MR Laureano Sonia, OT Occupational Therapist     Ami Smith, PT Physical Therapist     Lissy Gillette, OT Student OT Student                                 Physical Therapy Education       Title: PT OT SLP Therapies (In Progress)       Topic: Physical Therapy (In Progress)       Point: Mobility training (In Progress)       Learning Progress Summary            Patient Acceptance, E, NR by  at 11/8/2024 1126                      Point: Home exercise program (Not Started)       Learner Progress:  Not documented in this visit.              Point: Body mechanics (In Progress)       Learning Progress Summary            Patient Acceptance, E, NR by  at 11/8/2024 1126                      Point: Precautions (In Progress)       Learning Progress Summary            Patient Acceptance, E, NR by  at 11/8/2024 1126                                      User Key       Initials Effective Dates Name Provider Type Discipline     09/21/23 -  Ami Smith, PT Physical Therapist PT                  PT Recommendation and Plan  Planned Therapy Interventions (PT): balance training, bed mobility training, gait training, home exercise program, neuromuscular re-education, patient/family education, postural re-education, ROM (range of motion), strengthening, stretching, motor coordination training, transfer training, wheelchair management/propulsion training  Outcome Evaluation: PT eval completed. Pt presents below baseline function d/t lethargy, generalized weakness, balance deficits, decreased activity tolerance, and decreased  command following. Pt required depA x2 for bed mobility, t/f to chair, and STS from chair w/ BUE support and B knee blocking. Pt would benefit from skilled IP PT. Recommend SNF at d/c.     Time Calculation:   PT Evaluation Complexity  History, PT Evaluation Complexity: 3 or more personal factors and/or comorbidities  Examination of Body Systems (PT Eval Complexity): total of 3 or more elements  Clinical Presentation (PT Evaluation Complexity): evolving  Clinical Decision Making (PT Evaluation Complexity): moderate complexity  Overall Complexity (PT Evaluation Complexity): moderate complexity     PT Charges       Row Name 11/08/24 1127 11/08/24 1040 11/08/24 0914       Time Calculation    Start Time 0951  - -- --    PT Received On 11/08/24  - -- --    PT Goal Re-Cert Due Date 11/18/24  - -- --       Untimed Charges    PT Eval/Re-eval Minutes 46  -LH -- --       Total Minutes    Untimed Charges Total Minutes 46  -LH -- --     Total Minutes 46  -LH -- --       PT/SLP KX Modifier    Exception criteria met to exceed therapy cap -- Apply KX Modifier  - Apply KX Modifier  -RS      Row Name 11/08/24 0857 11/08/24 0816          PT/SLP KX Modifier    Exception criteria met to exceed therapy cap Apply KX Modifier  - Apply KX Modifier  -RS               User Key  (r) = Recorded By, (t) = Taken By, (c) = Cosigned By      Initials Name Provider Type    RS Sawyer Valle MS CCC-SLP Speech and Language Pathologist     Ami Smith, PT Physical Therapist                  Therapy Charges for Today       Code Description Service Date Service Provider Modifiers Qty    [id number] HC PT EVAL MOD COMPLEXITY 4 11/8/2024 Ami Smith, PT GP, KX 1            PT G-Codes  Outcome Measure Options: AM-PAC 6 Clicks Basic Mobility (PT)  AM-PAC 6 Clicks Score (PT): 6  AM-PAC 6 Clicks Score (OT): 6  PT Discharge Summary  Anticipated Discharge Disposition (PT): skilled nursing facility    Ami Smith PT  11/8/2024

## 2024-11-08 NOTE — PLAN OF CARE
Problem: Adult Inpatient Plan of Care  Goal: Plan of Care Review  Outcome: Progressing  Goal: Patient-Specific Goal (Individualized)  Outcome: Progressing  Goal: Absence of Hospital-Acquired Illness or Injury  Outcome: Progressing  Intervention: Identify and Manage Fall Risk  Recent Flowsheet Documentation  Taken 11/8/2024 0600 by Celeste Salinas RN  Safety Promotion/Fall Prevention:   activity supervised   assistive device/personal items within reach   clutter free environment maintained   fall prevention program maintained   lighting adjusted   nonskid shoes/slippers when out of bed   room organization consistent   safety round/check completed  Taken 11/8/2024 0400 by Celeste Salinas RN  Safety Promotion/Fall Prevention:   activity supervised   assistive device/personal items within reach   clutter free environment maintained   fall prevention program maintained   lighting adjusted   nonskid shoes/slippers when out of bed   room organization consistent   safety round/check completed  Taken 11/8/2024 0000 by Celeste Salinas RN  Safety Promotion/Fall Prevention:   activity supervised   assistive device/personal items within reach   clutter free environment maintained   fall prevention program maintained   lighting adjusted   nonskid shoes/slippers when out of bed   room organization consistent   safety round/check completed  Taken 11/7/2024 2230 by Celeste Salinas RN  Safety Promotion/Fall Prevention:   activity supervised   assistive device/personal items within reach   clutter free environment maintained   fall prevention program maintained   lighting adjusted   nonskid shoes/slippers when out of bed   room organization consistent   safety round/check completed  Intervention: Prevent Skin Injury  Recent Flowsheet Documentation  Taken 11/8/2024 0600 by Celeste Salinas RN  Body Position: left  Skin Protection:   drying agents applied   incontinence pads utilized   skin  sealant/moisture barrier applied   transparent dressing maintained  Taken 11/8/2024 0400 by Celeste Salinas RN  Body Position: right  Skin Protection:   drying agents applied   skin sealant/moisture barrier applied   transparent dressing maintained  Taken 11/8/2024 0000 by Celeste Salinas RN  Body Position:   turned   left  Skin Protection:   drying agents applied   incontinence pads utilized   skin sealant/moisture barrier applied   transparent dressing maintained  Taken 11/7/2024 2230 by Celeste Salinas RN  Body Position:   turned   right  Skin Protection:   incontinence pads utilized   skin sealant/moisture barrier applied   transparent dressing maintained   drying agents applied  Intervention: Prevent and Manage VTE (Venous Thromboembolism) Risk  Recent Flowsheet Documentation  Taken 11/7/2024 2234 by Celeste Salinas RN  VTE Prevention/Management:   bilateral   SCDs (sequential compression devices) on  Taken 11/7/2024 2230 by Celeste Salinas RN  VTE Prevention/Management:   bilateral   SCDs (sequential compression devices) off  Intervention: Prevent Infection  Recent Flowsheet Documentation  Taken 11/8/2024 0600 by Celeste Salinas RN  Infection Prevention:   environmental surveillance performed   equipment surfaces disinfected   hand hygiene promoted   single patient room provided  Taken 11/8/2024 0400 by Celeste Salinas RN  Infection Prevention:   environmental surveillance performed   equipment surfaces disinfected   hand hygiene promoted  Taken 11/8/2024 0000 by Celeste Salinas RN  Infection Prevention:   environmental surveillance performed   equipment surfaces disinfected   hand hygiene promoted   single patient room provided  Taken 11/7/2024 2230 by Celeste Salinas RN  Infection Prevention:   equipment surfaces disinfected   environmental surveillance performed   hand hygiene promoted   single patient room provided  Goal: Optimal Comfort and  Wellbeing  Outcome: Progressing  Intervention: Provide Person-Centered Care  Recent Flowsheet Documentation  Taken 11/7/2024 2230 by Celeste Salinas RN  Trust Relationship/Rapport: care explained  Goal: Readiness for Transition of Care  Outcome: Progressing     Problem: Fall Injury Risk  Goal: Absence of Fall and Fall-Related Injury  Outcome: Progressing  Intervention: Identify and Manage Contributors  Recent Flowsheet Documentation  Taken 11/8/2024 0600 by Celeste Salinas RN  Medication Review/Management: medications reviewed  Self-Care Promotion: independence encouraged  Taken 11/8/2024 0400 by Celeste Salinas RN  Medication Review/Management: medications reviewed  Self-Care Promotion: independence encouraged  Taken 11/8/2024 0000 by Celeste Salinas RN  Medication Review/Management: medications reviewed  Self-Care Promotion: independence encouraged  Taken 11/7/2024 2230 by Celeste Salinas RN  Medication Review/Management: medications reviewed  Self-Care Promotion: independence encouraged  Intervention: Promote Injury-Free Environment  Recent Flowsheet Documentation  Taken 11/8/2024 0600 by Celeste Salinas RN  Safety Promotion/Fall Prevention:   activity supervised   assistive device/personal items within reach   clutter free environment maintained   fall prevention program maintained   lighting adjusted   nonskid shoes/slippers when out of bed   room organization consistent   safety round/check completed  Taken 11/8/2024 0400 by Celeste Salinas RN  Safety Promotion/Fall Prevention:   activity supervised   assistive device/personal items within reach   clutter free environment maintained   fall prevention program maintained   lighting adjusted   nonskid shoes/slippers when out of bed   room organization consistent   safety round/check completed  Taken 11/8/2024 0000 by Celeste Salinas RN  Safety Promotion/Fall Prevention:   activity supervised   assistive  device/personal items within reach   clutter free environment maintained   fall prevention program maintained   lighting adjusted   nonskid shoes/slippers when out of bed   room organization consistent   safety round/check completed  Taken 11/7/2024 2230 by Celeste Salinas RN  Safety Promotion/Fall Prevention:   activity supervised   assistive device/personal items within reach   clutter free environment maintained   fall prevention program maintained   lighting adjusted   nonskid shoes/slippers when out of bed   room organization consistent   safety round/check completed     Problem: Comorbidity Management  Goal: Blood Glucose Level Within Target Range  Outcome: Progressing  Intervention: Monitor and Manage Glycemia  Recent Flowsheet Documentation  Taken 11/8/2024 0600 by Celeste Salinas RN  Medication Review/Management: medications reviewed  Taken 11/8/2024 0400 by Celeste Salinas RN  Medication Review/Management: medications reviewed  Taken 11/8/2024 0000 by Celeste Salinas RN  Medication Review/Management: medications reviewed  Taken 11/7/2024 2230 by Celeste Salinas RN  Medication Review/Management: medications reviewed  Goal: Blood Pressure in Desired Range  Outcome: Progressing  Intervention: Maintain Blood Pressure Management  Recent Flowsheet Documentation  Taken 11/8/2024 0600 by Celeste Salinas RN  Medication Review/Management: medications reviewed  Taken 11/8/2024 0400 by Celeste Salinas RN  Medication Review/Management: medications reviewed  Taken 11/8/2024 0000 by Celeste Salinas RN  Medication Review/Management: medications reviewed  Taken 11/7/2024 2230 by Celeste Salinas RN  Medication Review/Management: medications reviewed     Problem: Skin Injury Risk Increased  Goal: Skin Health and Integrity  Outcome: Progressing  Intervention: Optimize Skin Protection  Recent Flowsheet Documentation  Taken 11/8/2024 0600 by Celeste Salinas  RN  Activity Management: bedrest  Pressure Reduction Techniques:   frequent weight shift encouraged   pressure points protected   weight shift assistance provided  Head of Bed (HOB) Positioning: HOB elevated  Pressure Reduction Devices:   positioning supports utilized   pressure-redistributing mattress utilized   specialty bed utilized  Skin Protection:   drying agents applied   incontinence pads utilized   skin sealant/moisture barrier applied   transparent dressing maintained  Taken 11/8/2024 0400 by Celeste Salinas RN  Activity Management: bedrest  Pressure Reduction Techniques:   frequent weight shift encouraged   pressure points protected   weight shift assistance provided  Head of Bed (HOB) Positioning: HOB elevated  Pressure Reduction Devices:   positioning supports utilized   pressure-redistributing mattress utilized   specialty bed utilized  Skin Protection:   drying agents applied   skin sealant/moisture barrier applied   transparent dressing maintained  Taken 11/8/2024 0000 by Celeste Salinas RN  Activity Management: bedrest  Pressure Reduction Techniques:   frequent weight shift encouraged   pressure points protected   weight shift assistance provided  Head of Bed (HOB) Positioning: [facility] elevated  Pressure Reduction Devices:   positioning supports utilized   pressure-redistributing mattress utilized   specialty bed utilized  Skin Protection:   drying agents applied   incontinence pads utilized   skin sealant/moisture barrier applied   transparent dressing maintained  Taken 11/7/2024 2230 by Celeste Salinas RN  Activity Management: bedrest  Pressure Reduction Techniques:   frequent weight shift encouraged   pressure points protected   weight shift assistance provided  Head of Bed (HOB) Positioning: HOB elevated  Pressure Reduction Devices:   positioning supports utilized   pressure-redistributing mattress utilized   specialty bed utilized  Skin Protection:   incontinence pads utilized    skin sealant/moisture barrier applied   transparent dressing maintained   drying agents applied   Goal Outcome Evaluation:

## 2024-11-08 NOTE — THERAPY EVALUATION
Acute Care - Speech Language Pathology   Swallow Initial Evaluation [facility]  Clinical Swallow Evaluation       Patient Name: Omkar Nava  : 1955  MRN: [id number]  Today's Date: 2024               Admit Date: 2024    Visit Dx:     ICD-10-CM ICD-9-CM   1. Acute kidney injury  N17.9 584.9   2. Hyperkalemia  E87.5 276.7   3. Generalized weakness  R53.1 780.79   4. History of diabetes mellitus  Z86.39 V12.29   5. Vascular dementia, unspecified dementia severity, unspecified whether behavioral, psychotic, or mood disturbance or anxiety  F01.50 290.40   6. 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, without long-term current use of insulin    Acute osteomyelitis of left foot    Type 2 diabetes mellitus    Essential hypertension    Psychotic disorder    Neurocognitive disorder    S/P transmetatarsal amputation of foot, left    Abscess of left foot    Anemia of chronic disease    Aspiration pneumonia    Cervical spine pain    Chronic kidney disease    Closed head injury without loss of consciousness    Dementia    Dental cavities    Diarrhea of presumed infectious origin    Displaced fracture of proximal phalanx of left great toe, initial encounter for open fracture    Dysphagia    Erectile dysfunction    Generalized muscle weakness    Hallucinations    Hypertensive heart and chronic kidney disease 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 failure, with stage 1 through stage 4 chronic kidney disease, or unspecified chronic kidney disease    Insomnia due to medical condition    Iron deficiency anemia    Laceration without foreign body, left foot, subsequent encounter    Personal history of Methicillin resistant Staphylococcus aureus infection    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    Protein calorie malnutrition    Simple chronic bronchitis    Tobacco use     Type 2 diabetes mellitus with diabetic neuropathy, unspecified    Type 2 diabetes mellitus with diabetic chronic kidney disease    Acute type 1 respiratory failure    Severe vascular dementia without behavioral disturbance, psychotic disturbance, mood disturbance, or anxiety    JAYY (acute kidney injury)    Generalized weakness    Hyperkalemia     Past Medical History:   Diagnosis Date    Anemia     Dementia     Diabetes mellitus     Dysphagia     GERD (gastroesophageal reflux disease)     History of alcohol abuse     History of cocaine use     History of marijuana use     Hypertension     Osteomyelitis     Poor historian     records obtained from nursing home records & his family    Visual impairment      Past Surgical History:   Procedure Laterality Date    AMPUTATION FOOT Left 10/18/2022    Procedure: PARTIAL FIRST RAY AMPUTATION LEFT;  Surgeon: Yeison Petty MD;  Location:  [facility] OR;  Service: Orthopedics;  Laterality: Left;    AMPUTATION FOOT Left 12/5/2022    Procedure: Transmetatarsal of Left Foot;  Surgeon: Yeison Petty MD;  Location:  [facility] OR;  Service: Orthopedics;  Laterality: Left;    ENDOSCOPY N/A 3/14/2024    Procedure: ESOPHAGOGASTRODUODENOSCOPY WITH JEJUNAL TUBE INSERTION AT BEDSIDE;  Surgeon: Brunner, Mark I, MD;  Location:  [facility] ENDOSCOPY;  Service: Gastroenterology;  Laterality: N/A;    ENDOSCOPY W/ PEG TUBE PLACEMENT N/A 4/1/2024    Procedure: ESOPHAGOGASTRODUODENOSCOPY WITH PERCUTANEOUS ENDOSCOPIC GASTROSTOMY TUBE INSERTION;  Surgeon: Brunner, Mark I, MD;  Location:  [facility] ENDOSCOPY;  Service: Gastroenterology;  Laterality: N/A;  EGD with PEG placement.  Secured at 3.5 cm    EYE SURGERY         SLP Recommendation and Plan  SLP Swallowing Diagnosis: suspect chronic, oral dysphagia, R/O pharyngeal dysphagia, unable to assess (11/08/24 0900)  SLP Diet Recommendation: NPO, long term alternate methods of nutrition/hydration (11/08/24 0900)     SLP Rec. for Method of Medication Administration:  meds via alternate route (11/08/24 0900)     Monitor for Signs of Aspiration: yes, notify SLP if any concerns (11/08/24 0900)  Recommended Diagnostics: reassess via clinical swallow evaluation, other (see comments) (when alertness has improved) (11/08/24 0900)  Swallow Criteria for Skilled Therapeutic Interventions Met: demonstrates skilled criteria (11/08/24 0900)  Anticipated Discharge Disposition (SLP): long term acute care facility (11/08/24 0900)  Rehab Potential/Prognosis, Swallowing: re-evaluate goals as necessary (11/08/24 0900)  Therapy Frequency (Swallow): PRN, 5 days per week (11/08/24 0900)  Predicted Duration Therapy Intervention (Days): 1 week (11/08/24 0900)  Oral Care Recommendations: Oral Care BID/PRN, Suction toothbrush (11/08/24 0900)                                        Progress: no change      SWALLOW EVALUATION (Last 72 Hours)       SLP Adult Swallow Evaluation       Row Name 11/08/24 0900                   Rehab Evaluation    Document Type evaluation  -RS        Subjective Information no complaints  -RS        Patient Observations decreased LOC  -RS        Patient/Family/Caregiver Comments/Observations no family present  -RS        Patient Effort poor  -RS        Comment pt awake but minimally participatory. Pt appears changed from previous admission. RN alerted, notified, and at bedside. She states that MD is also aware  -RS        Symptoms Noted During/After Treatment none  -RS        Oral Care oral rinse provided  -RS           General Information    Patient Profile Reviewed yes  -RS        Pertinent History Of Current Problem Pt is a 69 yoM w PMHx HTN, DM2, CKD, vascular dementia, dysphagia and chronic iron deficiency anemia presents to the ED accompanied by daughter due to generalized weakness.  -RS        Current Method of Nutrition NPO;gastrostomy feedings  -RS        Precautions/Limitations, Vision vision impairment, bilaterally  -RS        Precautions/Limitations, Hearing WFL;for  purposes of eval  -RS        Prior Level of Function-Communication cognitive-linguistic impairment  -RS        Prior Level of Function-Swallowing thin liquids;puree;alternative feeding method;other (see comments)  [facility] 10/29/2024: mod-severe oral impairment, mild pharyngeal w recs  -RS        Plans/Goals Discussed with patient  -RS        Barriers to Rehab previous functional deficit  -RS        Patient's Goals for Discharge patient did not state  -RS           Pain    Additional Documentation Pain Scale: FACES Pre/Post-Treatment (Group)  -RS           Pain Scale: FACES Pre/Post-Treatment    Pain: FACES Scale, Pretreatment 0-->no hurt  -RS        Posttreatment Pain Rating 0-->no hurt  -RS           Oral Motor Structure and Function    Oral Lesions or Structural Abnormalities and/or variants open mouth posture  -RS        Dentition Assessment missing teeth;teeth are in poor condition  -RS        Secretion Management WNL/WFL  -RS        Mucosal Quality dry  -RS        Volitional Swallow unable to elicit  -RS        Volitional Cough unable to elicit  -RS           Oral Musculature and Cranial Nerve Assessment    Oral Motor, Comment pt u/a to follow directions to assess in isolation  -RS           General Eating/Swallowing Observations    Respiratory Support Currently in Use room air  -RS        Eating/Swallowing Skills fed by SLP  -RS        Positioning During Eating upright in bed  -RS        Utensils Used other (see comments)  ice chip presented by hand  -RS        Consistencies Trialed ice chips  -RS           Respiratory    Respiratory Status WFL  -RS           Clinical Swallow Eval    Oral Prep Phase impaired  -RS        Oral Transit impaired  -RS        Oral Residue WFL  -RS        Pharyngeal Phase suspected pharyngeal impairment  -RS        Esophageal Phase --  adequate PO not administered to determine  -RS           Oral Prep Concerns    Oral Prep Concerns oral holding;inefficient mastication;reduced lip  opening;incomplete or weak lip closure around spoon  -RS        Oral Holding other (see comments)  ice  -RS        Inefficient Mastication other (see comments)  ice  -RS        Reduced Lip Opening other (see comments)  ice  -RS        Incomplete or Weak Lip Closure Around Spoon other (see comments)  ice  -RS           Oral Transit Concerns    Oral Transit Concerns unable to initiate oral transit  -RS           Pharyngeal Phase Concerns    Pharyngeal Phase Concerns other (see comments)  Extremely delayed swallow  -RS        Pharyngeal Phase Concerns, Comment Pt is u/a to swallow on command. He is extremely lethargic so suspect that level of alertness is larger barrier to safe PO intake than a pharyngeal component. Continue PEG for nutritional support, SLP will re-assess when alertness improves  -RS           SLP Evaluation Clinical Impression    SLP Swallowing Diagnosis suspect chronic;oral dysphagia;R/O pharyngeal dysphagia;unable to assess  -RS        Functional Impact risk of aspiration/pneumonia;risk of malnutrition  -RS        Rehab Potential/Prognosis, Swallowing re-evaluate goals as necessary  -RS        Swallow Criteria for Skilled Therapeutic Interventions Met demonstrates skilled criteria  -RS           Recommendations    Therapy Frequency (Swallow) PRN;5 days per week  -RS        Predicted Duration Therapy Intervention (Days) 1 week  -RS        SLP Diet Recommendation NPO;long term alternate methods of nutrition/hydration  -RS        Recommended Diagnostics reassess via clinical swallow evaluation;other (see comments)  when alertness has improved  -RS        Oral Care Recommendations Oral Care BID/PRN;Suction toothbrush  -RS        SLP Rec. for Method of Medication Administration meds via alternate route  -RS        Monitor for Signs of Aspiration yes;notify SLP if any concerns  -RS        Anticipated Discharge Disposition (SLP) long term acute care [facility]  -RS                  User Key  (r) = Recorded  By, (t) = Taken By, (c) = Cosigned By      Initials Name Effective Dates    RS Sawyer Valle MS CCC-SLP 09/14/23 -                     EDUCATION  The patient has been educated in the following areas:   NPO rationale.                Time Calculation:    Time Calculation- SLP       Row Name 11/08/24 1023 11/08/24 0914 11/08/24 0857       Time Calculation- [facility]    SLP Start Time 0900  -RS -- --    SLP Received On 11/08/24  -RS -- --       Untimed Charges    98943-DW Eval Oral Pharyng Swallow Minutes 60  -RS -- --       Total Minutes    Untimed Charges Total Minutes 60  -RS -- --     Total Minutes 60  -RS -- --       PT/SLP KX Modifier    Exception criteria met to exceed therapy cap -- Apply KX Modifier  - Apply KX Modifier  -      Row Name 11/08/24 0816             PT/SLP KX Modifier    Exception criteria met to exceed therapy cap Apply KX Modifier  -RS                User Key  (r) = Recorded By, (t) = Taken By, (c) = Cosigned By      Initials Name Provider Type    Sawyer High MS CCC-SLP Speech and Language Pathologist     Ami Smith, PT Physical Therapist                    Therapy Charges for Today       Code Description Service Date Service Provider Modifiers Qty    [id number] HC ST EVAL ORAL PHARYNG SWALLOW 4 11/8/2024 Sawyer Valle MS CCC-SLP GN, KX 1                 Sawyer Valle MS CCC-JOVANA  11/8/2024

## 2024-11-08 NOTE — PLAN OF CARE
Goal Outcome Evaluation:  Plan of Care Reviewed With: patient        Progress: no change       Anticipated Discharge Disposition (SLP): long term acute care facility          SLP Swallowing Diagnosis: suspect chronic, oral dysphagia, R/O pharyngeal dysphagia, unable to assess (11/08/24 0900)

## 2024-11-08 NOTE — PROGRESS NOTES
[facility] Medicine Services  PROGRESS NOTE    Patient Name: Omkar Nava  : 1955  MRN: [id number]    Date of Admission: 2024  Primary Care Physician: Alberto Dye MD    Subjective   Subjective     CC:  Weakness    HPI:  Patient seen and examined this morning.  He minimally participated in evaluation.  Able to say hello and his name.  Denies any acute complaints.      Objective   Objective     Vital Signs:   Temp:  [93.2 °F (34 °C)-98.6 °F (37 °C)] 97.4 °F (36.3 °C)  Heart Rate:  [58-70] 59  Resp:  [18] 18  BP: (123-178)/(52-91) 123/59     Physical Exam  Constitutional:       General: He is not in acute distress.  Cardiovascular:      Rate and Rhythm: Normal rate and regular rhythm.      Heart sounds: Normal heart sounds.   Pulmonary:      Effort: Pulmonary effort is normal.      Breath sounds: Normal breath sounds.   Abdominal:      General: There is no distension.      Palpations: Abdomen is soft.      Tenderness: There is no abdominal tenderness.   Musculoskeletal:      Right lower leg: No edema.      Left lower leg: No edema.   Neurological:      Comments: Oriented to person, not very interactive          Results Reviewed:  LAB RESULTS:      Lab 24  0752 24  1729 24  1618   WBC 4.76  --  5.81   HEMOGLOBIN 7.7*  --  7.9*   HEMATOCRIT 24.1*  --  25.1*   PLATELETS 287  --  283   NEUTROS ABS 2.94  --  3.48   IMMATURE GRANS (ABS) 0.01  --  0.02   LYMPHS ABS 1.22  --  1.62   MONOS ABS 0.49  --  0.55   EOS ABS 0.08  --  0.12   MCV 94.1  --  96.2   HSTROP T  --  80* 84*         Lab 24  1256 24  0752 24  2313 24  1729 24  1618   SODIUM 137 133*  --   --  133*   POTASSIUM 6.2* 6.0* 5.8* 6.3* 6.3*   CHLORIDE 104 101  --   --  98   CO2 24.0 25.0  --   --  26.0   ANION GAP 9.0 7.0  --   --  9.0   BUN 31* 32*  --   --  42*   CREATININE 1.06 1.05  --   --  1.39*   EGFR 76.9 77.8  --   --  55.6*   GLUCOSE 99 86  --   --  104*   CALCIUM  9.4 9.3  --   --  9.9   MAGNESIUM  --   --   --   --  2.2   TSH  --   --   --   --  4.100         Lab 11/07/24  1618   TOTAL PROTEIN 7.2   ALBUMIN 3.8   GLOBULIN 3.4   ALT (SGPT) 20   AST (SGOT) 14   BILIRUBIN <0.2   ALK PHOS 85         Lab 11/07/24  1729 11/07/24  1618   HSTROP T 80* 84*             Lab 11/07/24  2313 11/07/24  1729   IRON  --  57*  57*   IRON SATURATION (TSAT)  --  17*   TIBC  --  337   TRANSFERRIN  --  226   FERRITIN  --  443.70*   FOLATE 11.80  --    VITAMIN B 12 1,303*  --    ABO TYPING O  --    RH TYPING Positive  --    ANTIBODY SCREEN Negative  --          Brief Urine Lab Results  (Last result in the past 365 days)        Color   Clarity   Blood   Leuk Est   Nitrite   Protein   CREAT   Urine HCG        11/07/24 1806 Yellow   Clear   Negative   Negative   Negative   Negative                   Microbiology Results Abnormal       None            CT Head Without Contrast    Result Date: 11/7/2024  CT HEAD WO CONTRAST Date of Exam: 11/7/2024 4:51 PM EST Indication: ams. Comparison: Noncontrast CT of the head performed on September 11, 2024 Technique: Axial CT images were obtained of the head without contrast administration.  Automated exposure control and iterative construction methods were used. Findings: There is no evidence of hemorrhage. There is no mass effect or midline shift. Age-related involutional changes are visualized. Bilateral periventricular white matter hypodensities are visualized compatible with changes of chronic microvessel ischemia. There is no extra-axial collections. Ventricles are normal in size and configuration for patient's stated age. Posterior fossa is within normal limits. Calvarium and skull base appear intact. Visualized sinuses show no air fluid levels. The mastoid air cells are clear. Postsurgical changes of the right orbit are visualized. Left ovary is within normal limits.     Impression: Impression: No acute intracranial process evident. Age-related  involutional changes and findings compatible with changes of chronic microvascular ischemia. Electronically Signed: Quentin Osorio MD  11/7/2024 5:06 PM EST  Workstation ID: [id number]    XR Chest 1 View    Result Date: 11/7/2024  XR CHEST 1 VW Date of Exam: 11/7/2024 4:19 PM EST Indication: Weak/Dizzy/AMS triage protocol Comparison: Chest radiograph performed on October 26, 2024 Findings: No focal consolidation or effusion.  There is no evidence of pneumothorax.  The pulmonary vasculature appears within normal limits.  The cardiac and mediastinal silhouette appear unremarkable.  No acute osseous abnormality identified.     Impression: Impression: No radiographic evidence of acute chest process. Electronically Signed: Quentin Osorio MD  11/7/2024 4:33 PM EST  Workstation ID: [id number]     Results for orders placed during the hospital encounter of 01/15/24    Adult Transthoracic Echo Complete With Contrast if Necessary Per Protocol    Interpretation Summary    Left ventricular ejection fraction appears to be 56 - 60%.    Left ventricular wall thickness is consistent with hypertrophy.    Estimated right ventricular systolic pressure from tricuspid regurgitation is mildly elevated (35-45 mmHg).    There is a small (1-2cm) pericardial effusion.    No evidence for pericardial tamponade      Current medications:  Scheduled Meds:[START ON 11/9/2024] ARIPiprazole, 5 mg, Per PEG Tube, Daily  brimonidine, 1 drop, Both Eyes, TID  [START ON 11/9/2024] cholecalciferol, 400 Units, Per PEG Tube, Daily  [START ON 11/9/2024] FLUoxetine, 20 mg, Per PEG Tube, Daily  insulin lispro, 2-7 Units, Subcutaneous, 4x Daily AC & at Bedtime  [START ON 11/9/2024] lansoprazole, 30 mg, Per PEG Tube, Q AM  palliative care oral rinse, 5 mL, Mouth/Throat, 4x Daily  QUEtiapine, 25 mg, Per PEG Tube, Nightly  senna-docusate sodium, 2 tablet, Per PEG Tube, BID  sodium chloride, 10 mL, Intravenous, Q12H  sodium zirconium cyclosilicate, 10 g, Per PEG Tube,  TID  terazosin, 5 mg, Per PEG Tube, Q12H  timolol, 1 drop, Both Eyes, BID  Valproic Acid, 150 mg, Per PEG Tube, Q8H      Continuous Infusions:   PRN Meds:.  acetaminophen    senna-docusate sodium **AND** polyethylene glycol **AND** [DISCONTINUED] bisacodyl **AND** bisacodyl    dextrose    glucagon (human recombinant)    hydrOXYzine    ipratropium-albuterol    nitroglycerin    sodium chloride    sodium chloride    sodium chloride    Assessment & Plan   Assessment & Plan     Active Hospital Problems    Diagnosis  POA    **JAYY (acute kidney injury) [N17.9]  Yes    Generalized weakness [R53.1]  Unknown    Hyperkalemia [E87.5]  Unknown    Severe vascular dementia without behavioral disturbance, psychotic disturbance, mood disturbance, or anxiety [F01.C0]  Yes    Dementia [F03.90]  Yes    Anemia of chronic disease [D63.8]  Yes    Essential hypertension [I10]  Yes    Type 2 diabetes mellitus [E11.9]  Yes      Resolved Hospital Problems   No resolved problems to display.        Brief Hospital Course to date:  Omkar Nava is a 69 y.o. male with hypertension, diabetes type 2, CKD, vascular dementia, dysphagia and chronic iron deficiency anemia presents to the ED from his nursing facility due to generalized weakness.    Generalized weakness  Concern for encephalopathy  -likely multifactorial in setting of JAYY, Hyperkalemia, anemia   -No signs of infection at this time.  Patient was hypothermic on arrival.  Complete infectious workup with blood cultures.  However no leukocytosis, chest x-ray clear, UA unremarkable  - TSH within normal limits  -Difficult to determine patient's baseline.  Staff here who have taken care of him before reports this is a significant mental status change for the patient.  After talking daughter on the phone however she describes similar behavior for the patient  -consult PT/OT/CM in am   -fall precautions   -up with assistance   -CT head negative     JAYY-resolved  -received 2L normal saline in the  ED  -BP stable   -repeat labs in am   -hold nephrotoxic agents  -check urine studies      Hyperkalemia  -received lokelma, calcium gluconate, insulin, D50W in the ED  -Potassium remains elevated, started scheduled Lokelma today  - Renal function improving will continue to monitor  - Given patient's overall weakness we will check a CK level  - Every 6 hour checks  - EKG without peaked T waves  -telemetry monitoring   -repeat potassium at midnight      Anemia of chronic disease   -H&H stable   -Anemia panel consistent with anemia of chronic disease  -type and screen   -Repeat CBC in a.m.     Chronic aspiration   -barium swallow 10/29: puree, thin liquids.  However n.p.o. due to mental status  -Continue with PEG tube feeds      Diabetes mellitus type 2  -Hgb A1c 5.6  -fingersticks q1 hour for now       HFpEF   Essential hypertension   -last echo EF 56-60%  -hold carvedilol (HR 47)  -BP reportedly low at Nursing facility, currently holding blood pressure medications  -hold diuretics   -strict I &O's   -daily weight      Dementia with agitation   ? Seziure disorder   -on seroquel, depakote,abilify and prozac     Expected Discharge Location and Transportation: TBD  Expected Discharge   Expected discharge date/ time has not been documented.     VTE Prophylaxis:  Mechanical VTE prophylaxis orders are present.         AM-PAC 6 Clicks Score (PT): 6 (11/08/24 1800)    CODE STATUS:   Code Status and Medical Interventions: CPR (Attempt to Resuscitate); Full Support   Ordered at: 11/07/24 2023     Level Of Support Discussed With:    Patient     Code Status (Patient has no pulse and is not breathing):    CPR (Attempt to Resuscitate)     Medical Interventions (Patient has pulse or is breathing):    Full Support       Jeanie Calixto MD  11/08/24

## 2024-11-08 NOTE — DISCHARGE PLACEMENT REQUEST
"Prema Sung (69 y.o. Male)      [place] [phone]. Patient is already longterm at [facility], pt's family wanting to look at potential other options in [place] if able, however.       Date of Birth   1955    Social Security Number       Address   [address] [place] [address]    Home Phone   [phone]    MRN   [id number]       Congregation   None    Marital Status   Single                            Admission Date   11/7/24    Admission Type   Emergency    Admitting Provider   Jeanie Calixto MD    Attending Provider   Jeanie Calixto MD    Department, Room/Bed   [facility] 3E, S331/1       Discharge Date       Discharge Disposition       Discharge Destination                                 Attending Provider: Jeanie Calixto MD    Allergies: No Known Allergies    Isolation: None   Infection: MRSA (07/31/23), COVID (History) (10/21/24)   Code Status: CPR    Ht: 172.7 cm (68\")   Wt: 82.6 kg (182 lb)    Admission Cmt: None   Principal Problem: JAYY (acute kidney injury) [N17.9]                   Active Insurance as of 11/7/2024       Primary Coverage       Payor Plan Insurance Group Employer/Plan Group    MEDICARE MEDICARE A & B        Payor Plan Address Payor Plan Phone Number Payor Plan Fax Number Effective Dates    [address] [phone]  8/1/2022 - None Entered    [facility] [id number]         Subscriber Name Subscriber Birth Date Member ID       PREMA SUNG 1955 [id number]               Secondary Coverage       Payor Plan Insurance Group Employer/Plan Group    [place]        Payor Plan Address Payor Plan Phone Number Payor Plan Fax Number Effective Dates    [address] [phone]  1/1/2024 - None Entered    [place] [address]         Subscriber Name Subscriber Birth Date Member ID       PREMA SUNG 1955 [id number]                     Emergency Contacts       " " (Rel.) Home Phone Work Phone Mobile Phone    Idalia Lerma (Daughter) [phone] -- [phone]    Thang Horton (Sister) [phone] -- [phone]                 History & Physical        Norma WeiNARGIS at 24       Attestation signed by Kerley, Brian Joseph, DO at 24 1163    I have reviewed this documentation and agree.                      [facility] Medicine Services  HISTORY AND PHYSICAL    Patient Name: Omkar Nava  : 1955  MRN: [id number]  Primary Care Physician: Alberto Dye MD  Date of admission: 2024    Subjective  Subjective     Chief Complaint:  Generalized weakness    HPI:  Omkar Nava is a 69 y.o. male with a past medical history significant for hypertension, diabetes mellitus type 2, CKD, vascular dementia, dysphagia and chronic iron deficiency anemia presents to the ED accompanied by daughter due to generalized weakness.  Daughter at bedside states she had visited him in the nursing facility last night and he \"was not his self.\"  Patient is currently oriented x3.  He denies any recent fever, chills, cough, shortness of breath, chest pain, dysuria or diarrhea.  He does report some nausea and decreased appetite.    Patient was recently admitted to [facility] from 10/20/2024 to 10/31/2024 secondary to UTI and rhinovirus.  He was initially on amoxicillin but antibiotics were broadened on 1026 due to postviral pneumonia versus HAP.        Review of Systems   Constitutional:  Positive for appetite change and fatigue. Negative for activity change, chills, diaphoresis and fever.   HENT: Negative.     Eyes:  Negative for photophobia and visual disturbance.   Respiratory:  Negative for cough and shortness of breath.    Cardiovascular:  Negative for chest pain, palpitations and leg swelling.   Gastrointestinal:  Positive for nausea. Negative for abdominal distention, abdominal pain, constipation, " diarrhea and vomiting.   Genitourinary: Negative.    Musculoskeletal:  Negative for back pain, neck pain and neck stiffness.   Skin: Negative.    Neurological:  Positive for weakness. Negative for dizziness, speech difficulty, light-headedness, numbness and headaches.   Psychiatric/Behavioral: Negative.                  Personal History     Past Medical History:   Diagnosis Date    Anemia     Dementia     Diabetes mellitus     Dysphagia     GERD (gastroesophageal reflux disease)     History of alcohol abuse     History of cocaine use     History of marijuana use     Hypertension     Osteomyelitis     Poor historian     records obtained from nursing home records & his family    Visual impairment              Past Surgical History:   Procedure Laterality Date    AMPUTATION FOOT Left 10/18/2022    Procedure: PARTIAL FIRST RAY AMPUTATION LEFT;  Surgeon: Yeison Petty MD;  Location:  [facility];  Service: Orthopedics;  Laterality: Left;    AMPUTATION FOOT Left 12/5/2022    Procedure: Transmetatarsal of Left Foot;  Surgeon: Yeison Petty MD;  Location:  [facility];  Service: Orthopedics;  Laterality: Left;    ENDOSCOPY N/A 3/14/2024    Procedure: ESOPHAGOGASTRODUODENOSCOPY WITH JEJUNAL TUBE INSERTION AT BEDSIDE;  Surgeon: Brunner, Mark I, MD;  Location:  [facility];  Service: Gastroenterology;  Laterality: N/A;    ENDOSCOPY W/ PEG TUBE PLACEMENT N/A 4/1/2024    Procedure: ESOPHAGOGASTRODUODENOSCOPY WITH PERCUTANEOUS ENDOSCOPIC GASTROSTOMY TUBE INSERTION;  Surgeon: Brunner, Mark I, MD;  Location:  [facility];  Service: Gastroenterology;  Laterality: N/A;  EGD with PEG placement.  Secured at 3.5 cm    EYE SURGERY         Family History:  family history includes Diabetes in his brother, brother, father, maternal grandfather, maternal grandmother, mother, and sister; Hypertension in his brother, brother, father, and mother.     Social History:  reports that he quit smoking about 7 years ago. His smoking use included  "cigarettes. He started smoking about 47 years ago. He has a 40 pack-year smoking history. He has been exposed to tobacco smoke. He has never used smokeless tobacco. He reports that he does not currently use alcohol. He reports current drug use. Drugs: Marijuana and \"Crack\" cocaine.  Social History     Social History Narrative    Caffeine 0-1 servings per day    Patient lives at home .       Medications:  ARIPiprazole, FLUoxetine, PALLIATIVE CARE ORAL RINSE ([facility]), QUEtiapine, Valproic Acid, Vitamin D3, acetaminophen, amLODIPine, brimonidine, carvedilol, cloNIDine, hydrALAZINE, hydrOXYzine, ipratropium-albuterol, lansoprazole, metFORMIN, polyethylene glycol, sennosides, terazosin, timolol, and torsemide    No Known Allergies    Objective  Objective     Vital Signs:   Heart Rate:  [47-48] 47  Resp:  [19] 19  BP: (113-134)/(59-69) 134/59    Physical Exam  Vitals and nursing note reviewed.   Constitutional:       General: He is not in acute distress.     Appearance: He is not ill-appearing, toxic-appearing or diaphoretic.   HENT:      Head: Normocephalic and atraumatic.      Nose: Nose normal.      Mouth/Throat:      Mouth: Mucous membranes are dry.      Pharynx: Oropharynx is clear.   Eyes:      Extraocular Movements: Extraocular movements intact.      Conjunctiva/sclera: Conjunctivae normal.   Cardiovascular:      Rate and Rhythm: Regular rhythm. Bradycardia present.      Pulses: Normal pulses.      Heart sounds: Normal heart sounds.   Pulmonary:      Effort: Pulmonary effort is normal.      Breath sounds: Normal breath sounds.   Abdominal:      General: Bowel sounds are normal. There is no distension.      Palpations: Abdomen is soft. There is no mass.      Tenderness: There is no abdominal tenderness. There is no right CVA tenderness, left CVA tenderness, guarding or rebound.      Hernia: No hernia is present.   Musculoskeletal:         General: No swelling, tenderness, deformity or signs of injury. Normal range of " motion.      Cervical back: Normal range of motion and neck supple.      Right lower leg: No edema.      Left lower leg: No edema.   Skin:     General: Skin is warm and dry.   Neurological:      Mental Status: He is alert and oriented to person, place, and time. Mental status is at baseline.   Psychiatric:         Mood and Affect: Mood normal.         Behavior: Behavior normal.            Result Review:  I have personally reviewed the results from the time of this admission to 11/7/2024 19:57 EST and agree with these findings:  [x]  Laboratory list / accordion  [x]  Microbiology  [x]  Radiology  [x]  EKG/Telemetry   []  Cardiology/Vascular   []  Pathology  []  Old records  []  Other:  Most notable findings include:     LAB RESULTS:      Lab 11/07/24  1618   WBC 5.81   HEMOGLOBIN 7.9*   HEMATOCRIT 25.1*   PLATELETS 283   NEUTROS ABS 3.48   IMMATURE GRANS (ABS) 0.02   LYMPHS ABS 1.62   MONOS ABS 0.55   EOS ABS 0.12   MCV 96.2         Lab 11/07/24  1729 11/07/24  1618   SODIUM  --  133*   POTASSIUM 6.3* 6.3*   CHLORIDE  --  98   CO2  --  26.0   ANION GAP  --  9.0   BUN  --  42*   CREATININE  --  1.39*   EGFR  --  55.6*   GLUCOSE  --  104*   CALCIUM  --  9.9   MAGNESIUM  --  2.2   TSH  --  4.100         Lab 11/07/24  1618   TOTAL PROTEIN 7.2   ALBUMIN 3.8   GLOBULIN 3.4   ALT (SGPT) 20   AST (SGOT) 14   BILIRUBIN <0.2   ALK PHOS 85         Lab 11/07/24  1729 11/07/24  1618   HSTROP T 80* 84*                 Brief Urine Lab Results  (Last result in the past 365 days)        Color   Clarity   Blood   Leuk Est   Nitrite   Protein   CREAT   Urine HCG        11/07/24 1806 Yellow   Clear   Negative   Negative   Negative   Negative                 Microbiology Results (last 10 days)       Procedure Component Value - Date/Time    COVID PRE-OP / PRE-PROCEDURE SCREENING ORDER (NO ISOLATION) - Swab, Nasopharynx [[id number]]  (Normal) Collected: 11/07/24 1729    Lab Status: Final result Specimen: Swab from Nasopharynx Updated:  11/07/24 1803    Narrative:      The following orders were created for panel order COVID PRE-OP / PRE-PROCEDURE SCREENING ORDER (NO ISOLATION) - Swab, Nasopharynx.  Procedure                               Abnormality         Status                     ---------                               -----------         ------                     COVID-19 and FLU A/B PCR...[[id number]]  Normal              Final result                 Please view results for these tests on the individual orders.    COVID-19 and FLU A/B PCR, 1 HR TAT - Swab, Nasopharynx [[id number]]  (Normal) Collected: 11/07/24 1729    Lab Status: Final result Specimen: Swab from Nasopharynx Updated: 11/07/24 1803     COVID19 Not Detected     Influenza A PCR Not Detected     Influenza B PCR Not Detected    Narrative:      Fact sheet for providers: https://www.fda.gov/media/190900/download    Fact sheet for patients: https://www.fda.gov/media/022114/download    Test performed by PCR.            CT Head Without Contrast    Result Date: 11/7/2024  CT HEAD WO CONTRAST Date of Exam: 11/7/2024 4:51 PM EST Indication: ams. Comparison: Noncontrast CT of the head performed on September 11, 2024 Technique: Axial CT images were obtained of the head without contrast administration.  Automated exposure control and iterative construction methods were used. Findings: There is no evidence of hemorrhage. There is no mass effect or midline shift. Age-related involutional changes are visualized. Bilateral periventricular white matter hypodensities are visualized compatible with changes of chronic microvessel ischemia. There is no extra-axial collections. Ventricles are normal in size and configuration for patient's stated age. Posterior fossa is within normal limits. Calvarium and skull base appear intact. Visualized sinuses show no air fluid levels. The mastoid air cells are clear. Postsurgical changes of the right orbit are visualized. Left ovary is within normal limits.      Impression: Impression: No acute intracranial process evident. Age-related involutional changes and findings compatible with changes of chronic microvascular ischemia. Electronically Signed: Quentin Osorio MD  11/7/2024 5:06 PM EST  Workstation ID: [id number]    XR Chest 1 View    Result Date: 11/7/2024  XR CHEST 1 VW Date of Exam: 11/7/2024 4:19 PM EST Indication: Weak/Dizzy/AMS triage protocol Comparison: Chest radiograph performed on October 26, 2024 Findings: No focal consolidation or effusion.  There is no evidence of pneumothorax.  The pulmonary vasculature appears within normal limits.  The cardiac and mediastinal silhouette appear unremarkable.  No acute osseous abnormality identified.     Impression: Impression: No radiographic evidence of acute chest process. Electronically Signed: Quentin Osorio MD  11/7/2024 4:33 PM EST  Workstation ID: [id number]     Results for orders placed during the hospital encounter of 01/15/24    Adult Transthoracic Echo Complete With Contrast if Necessary Per Protocol    Interpretation Summary    Left ventricular ejection fraction appears to be 56 - 60%.    Left ventricular wall thickness is consistent with hypertrophy.    Estimated right ventricular systolic pressure from tricuspid regurgitation is mildly elevated (35-45 mmHg).    There is a small (1-2cm) pericardial effusion.    No evidence for pericardial tamponade      Assessment & Plan  Assessment & Plan       JAYY (acute kidney injury)    Type 2 diabetes mellitus    Essential hypertension    Anemia of chronic disease    Dementia    Severe vascular dementia without behavioral disturbance, psychotic disturbance, mood disturbance, or anxiety    Generalized weakness    Hyperkalemia      69 year old male nursing home resident presents to the ED due to generalized weakness/fatigue.  Nursing home reported low BP today therefore sent to the ED for further evaluation.     1) Generalized weakness     -likely multifactorial in setting  of JAYY, Hyperkalemia, anemia   -consult PT/OT/CM in am   -fall precautions   -up with assistance   -UA negative  -Respiratory PCR negative   -CXR negative  -CT head negative     2) JAYY   -received 2L normal saline in the ED  -BP stable   -repeat labs in am   -hold nephrotoxic agents  -check urine studies     3) Hyperkalemia  -received lokelma, calcium gluconate, insulin, D50W in the ED  -glucose checks q 1 hour  -telemetry monitoring   -repeat potassium at midnight     4) Anemia of chronic disease   -H&H stable   -check anemia panel   -type and screen   -trend H&H overnight     5) Chronic aspiration   -barium swallow 10/29: puree, thin liquids.  Continue with PEG tube feeds     6) Diabetes mellitus type 2  -Hgb A1c 5.6  -fingersticks q1 hour for now     7) HFpEF      Essential hypertension   -last echo EF 56-60%  -hold carvedilol (HR 47)  -BP reportedly low at Nursing facility, will hold BP meds tonight   -hold diuretics   -strict I &O's   -daily weight     8) Dementia with agitation       ? Seziure disorder   -on seroquel, depakote,abilify and prozac       DVT prophylaxis:  scds    CODE STATUS:  Full Code        Expected Discharge  TBD     This note has been completed as part of a split-shared workflow.     Signature: Electronically signed by NARGIS Valdez, 11/07/24, 8:15 PM EST.                  Electronically signed by Kerley, Brian Joseph, DO at 11/07/24 2326       Prior to Admission Medications       Prescriptions Last Dose Informant Patient Reported? Taking?    acetaminophen (TYLENOL) 160 MG/5ML solution Past Week  No Yes    Administer 20.3 mL per G tube Every 6 (Six) Hours As Needed for Mild Pain or Fever.    amLODIPine (NORVASC) 10 MG tablet 11/7/2024  No Yes    Administer 1 tablet per G tube Daily.    ARIPiprazole (ABILIFY) 5 MG tablet 11/7/2024  No Yes    Administer 1 tablet per G tube Daily.    brimonidine (ALPHAGAN) 0.2 % ophthalmic solution 11/7/2024  No Yes    Administer 1 drop to both eyes 3  (Three) Times a Day.    carvedilol (COREG) 12.5 MG tablet 11/7/2024  No Yes    Administer 1 tablet per G tube Every 12 (Twelve) Hours.    Cholecalciferol (Vitamin D3) 50 MCG (2000 UT) tablet 11/7/2024  Yes Yes    Administer 1 tablet per G tube Daily.    cloNIDine (CATAPRES) 0.2 MG tablet 11/7/2024  No Yes    Administer 1 tablet per G tube Every 8 (Eight) Hours.    FLUoxetine (PROzac) 20 MG/5ML solution 11/7/2024  No Yes    Take 5 mL by mouth Daily.    hydrALAZINE (APRESOLINE) 100 MG tablet 11/7/2024  No Yes    Administer 1 tablet per G tube Every 8 (Eight) Hours.    hydrOXYzine (ATARAX) 25 MG tablet Past Week  No Yes    Administer 1 tablet per G tube 3 (Three) Times a Day As Needed for Anxiety, Allergies or Itching.    lansoprazole (PREVACID SOLUTAB) 30 MG Tablet Delayed Release Dispersible disintegrating tablet 11/7/2024  No Yes    1 tablet by Nasogastric route Every Morning.    metFORMIN (GLUCOPHAGE) 500 MG tablet 11/7/2024  Yes Yes    Administer 1 tablet per G tube 2 (Two) Times a Day With Meals.    polyethylene glycol (MIRALAX) 17 g packet 11/7/2024  No Yes    Take 17 g by mouth Daily.    QUEtiapine (SEROquel) 25 MG tablet 11/6/2024  Yes Yes    Administer 1 tablet per G tube Every Night.    sennosides (SENOKOT) 8.8 MG/5ML syrup 11/6/2024  No Yes    Administer 10 mL per G tube Every Night.    terazosin (HYTRIN) 5 MG capsule 11/7/2024  No Yes    Administer 1 capsule per G tube Every 12 (Twelve) Hours.    timolol (TIMOPTIC) 0.5 % ophthalmic solution 11/7/2024  No Yes    Administer 1 drop to both eyes 2 (Two) Times a Day.    torsemide (DEMADEX) 5 MG tablet 11/7/2024  No Yes    Administer 1 tablet per G tube Daily.    Valproic Acid (DEPAKENE) 250 MG/5ML solution syrup 11/7/2024  No Yes    3 mL by Nasogastric route Every 8 (Eight) Hours.    ipratropium-albuterol (DUO-NEB) 0.5-2.5 mg/3 ml nebulizer Unknown  No No    Take 3 mL by nebulization Every 4 (Four) Hours As Needed for Shortness of Air or Wheezing.    PALLIATIVE  CARE ORAL RINSE, [facility], Unknown  No No    Apply 5 mL to the mouth or throat 4 (Four) Times a Day.          Current Facility-Administered Medications   Medication Dose Route Frequency Provider Last Rate Last Admin    acetaminophen (TYLENOL) 160 MG/5ML oral solution 650 mg  650 mg Per G Tube Q6H PRN Eriberto, Norma, APRN        ARIPiprazole (ABILIFY) tablet 5 mg  5 mg Per G Tube Daily Eriberto, Norma, APRN   5 mg at 11/08/24 1052    sennosides-docusate (PERICOLACE) 8.6-50 MG per tablet 2 tablet  2 tablet Oral BID Eriberto, Norma, APRN        And    polyethylene glycol (MIRALAX) packet 17 g  17 g Oral Daily PRN Eriberto, Norma, APRN        And    bisacodyl (DULCOLAX) EC tablet 5 mg  5 mg Oral Daily PRN Eriberto, Norma, APRN        And    bisacodyl (DULCOLAX) suppository 10 mg  10 mg Rectal Daily PRN Eriberto, Norma, APRN        brimonidine (ALPHAGAN) 0.2 % ophthalmic solution 1 drop  1 drop Both Eyes TID Eriberto, Norma, APRN   1 drop at 11/08/24 0842    cholecalciferol 10 MCG/ML (400 units/mL) oral liquid 400 Units  400 Units Oral Daily Eriberto, Norma, APRN   400 Units at 11/08/24 1052    dextrose (D50W) (25 g/50 mL) IV injection 25 g  25 g Intravenous Q15 Min PRN Eriberto, Norma, APRN        dextrose (GLUTOSE) oral gel 15 g  15 g Oral Q15 Min PRN Eriberto, Norma, APRN        FLUoxetine (PROzac) 20 MG/5ML solution 20 mg  20 mg Oral Daily Eriberto, Norma, APRN   20 mg at 11/08/24 1052    glucagon (GLUCAGEN) injection 1 mg  1 mg Intramuscular Q15 Min PRN Eriberto, Norma, APRN        hydrOXYzine (ATARAX) tablet 25 mg  25 mg Per G Tube TID PRN Eriberto, Norma, APRN        Insulin Lispro (humaLOG) injection 2-7 Units  2-7 Units Subcutaneous 4x Daily AC & at Bedtime Eriberto, Norma, APRN        ipratropium-albuterol (DUO-NEB) nebulizer solution 3 mL  3 mL Nebulization Q4H PRN Eriberto, Norma, APRN        lansoprazole (PREVACID SOLUTAB) disintegrating tablet Tablet Delayed Release Dispersible 30 mg  30 mg  Nasogastric Q AM Eriberto, Norma, APRN   30 mg at 24 0603    nitroglycerin (NITROSTAT) SL tablet 0.4 mg  0.4 mg Sublingual Q5 Min PRN Eriberto, Norma, APRN        palliative care oral rinse  5 mL Mouth/Throat 4x Daily Eriberto, Norma, APRN   5 mL at 24 0921    QUEtiapine (SEROquel) tablet 25 mg  25 mg Per G Tube Nightly Eriberto, Norma, APRN   25 mg at 24 2314    sodium chloride 0.9 % flush 10 mL  10 mL Intravenous PRN John Jade MD        sodium chloride 0.9 % flush 10 mL  10 mL Intravenous Q12H Eriberto, Norma, APRN   10 mL at 24 1053    sodium chloride 0.9 % flush 10 mL  10 mL Intravenous PRN Eriberto, Norma, APRN   10 mL at 24 2319    sodium chloride 0.9 % infusion 40 mL  40 mL Intravenous PRN Eriberto, Norma, APRN        terazosin (HYTRIN) capsule 5 mg  5 mg Per G Tube Q12H Eriberto, Norma, APRN   5 mg at 24 1052    timolol (TIMOPTIC) 0.5 % ophthalmic solution 1 drop  1 drop Both Eyes BID Eriberto, Norma, APRN   1 drop at 24 0842    Valproic Acid (DEPAKENE) syrup 150 mg  150 mg Nasogastric Q8H Eriberto, Norma, APRN   150 mg at 24 0603     Physician Progress Notes (most recent note)    No notes of this type exist for this encounter.       Physical Therapy Notes (most recent note)    No notes exist for this encounter.       Occupational Therapy Notes (most recent note)    No notes exist for this encounter.          Speech Language Pathology Notes (most recent note)        Sawyer Valle MS CCC-SLP at 24 1025          Acute Care - Speech Language Pathology   Swallow Initial Evaluation  [facility]  Clinical Swallow Evaluation       Patient Name: Omkar Nava  : 1955  MRN: [id number]  Today's Date: 2024               Admit Date: 2024    Visit Dx:     ICD-10-CM ICD-9-CM   1. Acute kidney injury  N17.9 584.9   2. Hyperkalemia  E87.5 276.7   3. Generalized weakness  R53.1 780.79   4. History of diabetes mellitus  Z86.39 V12.29    5. Vascular dementia, unspecified dementia severity, unspecified whether behavioral, psychotic, or mood disturbance or anxiety  F01.50 290.40   6. 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, without long-term current use of insulin    Acute osteomyelitis of left foot    Type 2 diabetes mellitus    Essential hypertension    Psychotic disorder    Neurocognitive disorder    S/P transmetatarsal amputation of foot, left    Abscess of left foot    Anemia of chronic disease    Aspiration pneumonia    Cervical spine pain    Chronic kidney disease    Closed head injury without loss of consciousness    Dementia    Dental cavities    Diarrhea of presumed infectious origin    Displaced fracture of proximal phalanx of left great toe, initial encounter for open fracture    Dysphagia    Erectile dysfunction    Generalized muscle weakness    Hallucinations    Hypertensive heart and chronic kidney disease 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 failure, with stage 1 through stage 4 chronic kidney disease, or unspecified chronic kidney disease    Insomnia due to medical condition    Iron deficiency anemia    Laceration without foreign body, left foot, subsequent encounter    Personal history of Methicillin resistant Staphylococcus aureus infection    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    Protein calorie malnutrition    Simple chronic bronchitis    Tobacco use    Type 2 diabetes mellitus with diabetic neuropathy, unspecified    Type 2 diabetes mellitus with diabetic chronic kidney disease    Acute type 1 respiratory failure    Severe vascular dementia without behavioral disturbance, psychotic disturbance, mood disturbance, or anxiety    JAYY (acute kidney injury)    Generalized weakness    Hyperkalemia     Past Medical History:   Diagnosis Date    Anemia     Dementia     Diabetes mellitus      Dysphagia     GERD (gastroesophageal reflux disease)     History of alcohol abuse     History of cocaine use     History of marijuana use     Hypertension     Osteomyelitis     Poor historian     records obtained from nursing home records & his family    Visual impairment      Past Surgical History:   Procedure Laterality Date    AMPUTATION FOOT Left 10/18/2022    Procedure: PARTIAL FIRST RAY AMPUTATION LEFT;  Surgeon: Yeison Petty MD;  Location:  [facility];  Service: Orthopedics;  Laterality: Left;    AMPUTATION FOOT Left 12/5/2022    Procedure: Transmetatarsal of Left Foot;  Surgeon: Yeison Petty MD;  Location:  [facility];  Service: Orthopedics;  Laterality: Left;    ENDOSCOPY N/A 3/14/2024    Procedure: ESOPHAGOGASTRODUODENOSCOPY WITH JEJUNAL TUBE INSERTION AT BEDSIDE;  Surgeon: Brunner, Mark I, MD;  Location:  [facility];  Service: Gastroenterology;  Laterality: N/A;    ENDOSCOPY W/ PEG TUBE PLACEMENT N/A 4/1/2024    Procedure: ESOPHAGOGASTRODUODENOSCOPY WITH PERCUTANEOUS ENDOSCOPIC GASTROSTOMY TUBE INSERTION;  Surgeon: Brunner, Mark I, MD;  Location:  [facility];  Service: Gastroenterology;  Laterality: N/A;  EGD with PEG placement.  Secured at 3.5 cm    EYE SURGERY         SLP Recommendation and Plan  SLP Swallowing Diagnosis: suspect chronic, oral dysphagia, R/O pharyngeal dysphagia, unable to assess (11/08/24 0900)  SLP Diet Recommendation: NPO, long term alternate methods of nutrition/hydration (11/08/24 0900)     SLP Rec. for Method of Medication Administration: meds via alternate route (11/08/24 0900)     Monitor for Signs of Aspiration: yes, notify SLP if any concerns (11/08/24 0900)  Recommended Diagnostics: reassess via clinical swallow evaluation, other (see comments) (when alertness has improved) (11/08/24 0900)  Swallow Criteria for Skilled Therapeutic Interventions Met: demonstrates skilled criteria (11/08/24 0900)  Anticipated Discharge Disposition (SLP): long term acute care facility  (11/08/24 0900)  Rehab Potential/Prognosis, Swallowing: re-evaluate goals as necessary (11/08/24 0900)  Therapy Frequency (Swallow): PRN, 5 days per week (11/08/24 0900)  Predicted Duration Therapy Intervention (Days): 1 week (11/08/24 0900)  Oral Care Recommendations: Oral Care BID/PRN, Suction toothbrush (11/08/24 0900)                                        Progress: no change      SWALLOW EVALUATION (Last 72 Hours)       SLP Adult Swallow Evaluation       Row Name 11/08/24 0900                   Rehab Evaluation    Document Type evaluation  -RS        Subjective Information no complaints  -RS        Patient Observations decreased LOC  -RS        Patient/Family/Caregiver Comments/Observations no family present  -RS        Patient Effort poor  -RS        Comment pt awake but minimally participatory. Pt appears changed from previous admission. RN alerted, notified, and at bedside. She states that MD is also aware  -RS        Symptoms Noted During/After Treatment none  -RS        Oral Care oral rinse provided  -RS           General Information    Patient Profile Reviewed yes  -RS        Pertinent History Of Current Problem Pt is a 69 yoM w PMHx HTN, DM2, CKD, vascular dementia, dysphagia and chronic iron deficiency anemia presents to the ED accompanied by daughter due to generalized weakness.  -RS        Current Method of Nutrition NPO;gastrostomy feedings  -RS        Precautions/Limitations, Vision vision impairment, bilaterally  -RS        Precautions/Limitations, Hearing WFL;for purposes of eval  -RS        Prior Level of Function-Communication cognitive-linguistic impairment  -RS        Prior Level of Function-Swallowing thin liquids;puree;alternative feeding method;other (see comments)  [facility] 10/29/2024: mod-severe oral impairment, mild pharyngeal w recs  -RS        Plans/Goals Discussed with patient  -RS        Barriers to Rehab previous functional deficit  -RS        Patient's Goals for Discharge patient did  not state  -RS           Pain    Additional Documentation Pain Scale: FACES Pre/Post-Treatment (Group)  -RS           Pain Scale: FACES Pre/Post-Treatment    Pain: FACES Scale, Pretreatment 0-->no hurt  -RS        Posttreatment Pain Rating 0-->no hurt  -RS           Oral Motor Structure and Function    Oral Lesions or Structural Abnormalities and/or variants open mouth posture  -RS        Dentition Assessment missing teeth;teeth are in poor condition  -RS        Secretion Management WNL/WFL  -RS        Mucosal Quality dry  -RS        Volitional Swallow unable to elicit  -RS        Volitional Cough unable to elicit  -RS           Oral Musculature and Cranial Nerve Assessment    Oral Motor, Comment pt u/a to follow directions to assess in isolation  -RS           General Eating/Swallowing Observations    Respiratory Support Currently in Use room air  -RS        Eating/Swallowing Skills fed by SLP  -RS        Positioning During Eating upright in bed  -RS        Utensils Used other (see comments)  ice chip presented by hand  -RS        Consistencies Trialed ice chips  -RS           Respiratory    Respiratory Status WFL  -RS           Clinical Swallow Eval    Oral Prep Phase impaired  -RS        Oral Transit impaired  -RS        Oral Residue WFL  -RS        Pharyngeal Phase suspected pharyngeal impairment  -RS        Esophageal Phase --  adequate PO not administered to determine  -RS           Oral Prep Concerns    Oral Prep Concerns oral holding;inefficient mastication;reduced lip opening;incomplete or weak lip closure around spoon  -RS        Oral Holding other (see comments)  ice  -RS        Inefficient Mastication other (see comments)  ice  -RS        Reduced Lip Opening other (see comments)  ice  -RS        Incomplete or Weak Lip Closure Around Spoon other (see comments)  ice  -RS           Oral Transit Concerns    Oral Transit Concerns unable to initiate oral transit  -RS           Pharyngeal Phase Concerns     Pharyngeal Phase Concerns other (see comments)  Extremely delayed swallow  -RS        Pharyngeal Phase Concerns, Comment Pt is u/a to swallow on command. He is extremely lethargic so suspect that level of alertness is larger barrier to safe PO intake than a pharyngeal component. Continue PEG for nutritional support, SLP will re-assess when alertness improves  -RS           SLP Evaluation Clinical Impression    SLP Swallowing Diagnosis suspect chronic;oral dysphagia;R/O pharyngeal dysphagia;unable to assess  -RS        Functional Impact risk of aspiration/pneumonia;risk of malnutrition  -RS        Rehab Potential/Prognosis, Swallowing re-evaluate goals as necessary  -RS        Swallow Criteria for Skilled Therapeutic Interventions Met demonstrates skilled criteria  -RS           Recommendations    Therapy Frequency (Swallow) PRN;5 days per week  -RS        Predicted Duration Therapy Intervention (Days) 1 week  -RS        SLP Diet Recommendation NPO;long term alternate methods of nutrition/hydration  -RS        Recommended Diagnostics reassess via clinical swallow evaluation;other (see comments)  when alertness has improved  -RS        Oral Care Recommendations Oral Care BID/PRN;Suction toothbrush  -RS        SLP Rec. for Method of Medication Administration meds via alternate route  -RS        Monitor for Signs of Aspiration yes;notify SLP if any concerns  -RS        Anticipated Discharge Disposition (SLP) long term acute care facility  -RS                  User Key  (r) = Recorded By, (t) = Taken By, (c) = Cosigned By      Initials Name Effective Dates    RS Sawyer Valle MS CCC-SLP 09/14/23 -                     EDUCATION  The patient has been educated in the following areas:   NPO rationale.                Time Calculation:    Time Calculation- SLP       Row Name 11/08/24 1023 11/08/24 0914 11/08/24 0857       Time Calculation- SLP    SLP Start Time 0900  - -- --    SLP Received On 11/08/24  -RS -- --        Untimed Charges    [id number] Eval Oral Pharyng Swallow Minutes 60  -RS -- --       Total Minutes    Untimed Charges Total Minutes 60  -RS -- --     Total Minutes 60  -RS -- --       PT/SLP KX Modifier    Exception criteria met to exceed therapy cap -- Apply KX Modifier  -RS Apply KX Modifier  -      Row Name 24 0816             PT/SLP KX Modifier    Exception criteria met to exceed therapy cap Apply KX Modifier  -RS                User Key  (r) = Recorded By, (t) = Taken By, (c) = Cosigned By      Initials Name Provider Type    RS Sawyer Valle MS CCC-SLP Speech and Language Pathologist     Ami Smith, PT Physical Therapist                    Therapy Charges for Today       Code Description Service Date Service Provider Modifiers Qty    [id number] HC ST EVAL ORAL PHARYNG SWALLOW 4 2024 Sawyer Valle MS CCC-SLP GN, KX 1                 Sawyer Valle MS CCC-SLP  2024    Electronically signed by Sawyer Valle MS CCC-SLP at 24 1028        Lissy Gillette, OT Student   OT Student  Occupational Therapy     Therapy Evaluation      Attested     Date of Service: 24 0942  Creation Time: 24 1042     Attested           Attestation signed by Sonia Laureano OT at 24 1054     I attest, as a qualified practitioner, that I was present for the entire session of this patient's care without engagement in other tasks and that the student's participation was guided by my direction and skilled clinical judgement.   Sonia Laureano 24 10:54AM            Expand All Collapse All[]Expand All by Default    Patient Name: Omkar Nava                     : 1955                        MRN: [id number]                              Today's Date: 2024                                   Admit Date: 2024                        Visit Dx:   Visit Diagnosis       ICD-10-CM ICD-9-CM   1. Acute kidney injury  N17.9 584.9   2. Hyperkalemia  E87.5 276.7   3. Generalized weakness  R53.1  780.79   4. History of diabetes mellitus  Z86.39 V12.29   5. Vascular dementia, unspecified dementia severity, unspecified whether behavioral, psychotic, or mood disturbance or anxiety  F01.50 290.40   6. 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, without long-term current use of insulin    Acute osteomyelitis of left foot    Type 2 diabetes mellitus    Essential hypertension    Psychotic disorder    Neurocognitive disorder    S/P transmetatarsal amputation of foot, left    Abscess of left foot    Anemia of chronic disease    Aspiration pneumonia    Cervical spine pain    Chronic kidney disease    Closed head injury without loss of consciousness    Dementia    Dental cavities    Diarrhea of presumed infectious origin    Displaced fracture of proximal phalanx of left great toe, initial encounter for open fracture    Dysphagia    Erectile dysfunction    Generalized muscle weakness    Hallucinations    Hypertensive heart and chronic kidney disease 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 failure, with stage 1 through stage 4 chronic kidney disease, or unspecified chronic kidney disease    Insomnia due to medical condition    Iron deficiency anemia    Laceration without foreign body, left foot, subsequent encounter    Personal history of Methicillin resistant Staphylococcus aureus infection    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    Protein calorie malnutrition    Simple chronic bronchitis    Tobacco use    Type 2 diabetes mellitus with diabetic neuropathy, unspecified    Type 2 diabetes mellitus with diabetic chronic kidney disease    Acute type 1 respiratory failure    Severe vascular dementia without behavioral disturbance, psychotic disturbance, mood disturbance, or anxiety    JAYY (acute kidney injury)    Generalized weakness    Hyperkalemia          Medical History        Past Medical History:   Diagnosis Date    Anemia      Dementia      Diabetes mellitus      Dysphagia      GERD (gastroesophageal reflux disease)      History of alcohol abuse      History of cocaine use      History of marijuana use      Hypertension      Osteomyelitis      Poor historian       records obtained from nursing home records & his family    Visual impairment           Surgical History         Past Surgical History:   Procedure Laterality Date    AMPUTATION FOOT Left 10/18/2022     Procedure: PARTIAL FIRST RAY AMPUTATION LEFT;  Surgeon: Yeison Petty MD;  Location:  [facility];  Service: Orthopedics;  Laterality: Left;    AMPUTATION FOOT Left 12/5/2022     Procedure: Transmetatarsal of Left Foot;  Surgeon: Yeison Petty MD;  Location:  [facility];  Service: Orthopedics;  Laterality: Left;    ENDOSCOPY N/A 3/14/2024     Procedure: ESOPHAGOGASTRODUODENOSCOPY WITH JEJUNAL TUBE INSERTION AT BEDSIDE;  Surgeon: Brunner, Mark I, MD;  Location:  [facility];  Service: Gastroenterology;  Laterality: N/A;    ENDOSCOPY W/ PEG TUBE PLACEMENT N/A 4/1/2024     Procedure: ESOPHAGOGASTRODUODENOSCOPY WITH PERCUTANEOUS ENDOSCOPIC GASTROSTOMY TUBE INSERTION;  Surgeon: Brunner, Mark I, MD;  Location:  [facility];  Service: Gastroenterology;  Laterality: N/A;  EGD with PEG placement.  Secured at 3.5 cm    EYE SURGERY               General Information         Row Name 11/08/24 1019                 OT Time and Intention     Document Type evaluation (P)   -       Mode of Treatment occupational therapy (P)   -          Row Name 11/08/24 1019                 General Information     Patient Profile Reviewed yes (P)   -       Prior Level of Function -- (P)   Pt is poor historian. Per chart review, pt needs assist with ADLs, and prior to last admit pt coukd sit EOB and transfer w/ pole and assist of staff at SNF.  -       Existing Precautions/Restrictions fall (P)   H/O L transmetatarsal  amputation, has PEG, blind (sees shadows)  -       Barriers to Rehab medically complex;previous functional deficit;cognitive status;visual deficit;hearing deficit (P)   -          Row Name 11/08/24 1019                 Living Environment     People in Home facility resident (P)   -[facility] Name 11/08/24 1019                 Home Main Entrance     Number of Stairs, Main Entrance none (P)   -          Row Name 11/08/24 1019                 Stairs Within Home, Primary     Number of Stairs, Within Home, Primary none (P)   -[facility] Name 11/08/24 1019                 Cognition     Orientation Status (Cognition) unable/difficult to assess (P)   Pt able to nod yes and confirm when asked name and birth month  -[facility] Name 11/08/24 1019                 Safety Issues/Impairments Affecting Functional Mobility     Safety Issues Affecting Function (Mobility) ability to follow commands;awareness of need for assistance;insight into deficits/self-awareness;safety precaution awareness;safety precautions follow-through/compliance;sequencing abilities (P)   -       Impairments Affecting Function (Mobility) balance;cognition;endurance/activity tolerance;postural/trunk control;range of motion (ROM);strength;visual/perceptual (P)   -       Cognitive Impairments, Mobility Safety/Performance attention;awareness, need for assistance;insight into deficits/self-awareness;safety precaution awareness;safety precaution follow-through;sequencing abilities (P)   -                       User Key  (r) = Recorded By, (t) = Taken By, (c) = Cosigned By        Initials Name Provider Type      Lissy Gillette OT Student OT Student                                     Mobility/ADL's         Row Name 11/08/24 1028                 Bed Mobility     Bed Mobility rolling left;rolling right (P)   -       Rolling Left [place] (Bed Mobility) dependent (less than 25% patient effort);2 person assist;verbal  cues;nonverbal cues (demo/gesture) (P)   -       Rolling Right [place] (Bed Mobility) dependent (less than 25% patient effort);2 person assist;verbal cues;nonverbal cues (demo/gesture) (P)   -       Bed Mobility, Safety Issues cognitive deficits limit understanding;decreased use of arms for pushing/pulling;decreased use of legs for bridging/pushing;impaired trunk control for bed mobility (P)   -       Assistive Device (Bed Mobility) bed rails (P)   -       Comment, (Bed Mobility) Rolling L and R for sling placement (P)   -          Row Name 11/08/24 1028                 Transfers     Transfers bed-chair transfer (P)   -          Row Name 11/08/24 1028                 Bed-Chair Transfer     Bed-Chair [place] (Transfers) dependent (less than 25% patient effort);2 person assist (P)   -       Assistive Device (Bed-Chair Transfers) lift device (P)   -          Row Name 11/08/24 1028                 Functional Mobility     Patient was able to Ambulate no, other medical factors prevent ambulation (P)   -       Reason Patient was unable to Ambulate Excessive Weakness;Cognitive Deficit/Severe Dementia (P)   -          Row Name 11/08/24 1028                 Activities of Daily Living     BADL Assessment/Intervention grooming;lower body dressing (P)   -          Row Name 11/08/24 1028                 Grooming Assessment/Training     [place] Level (Grooming) wash face, hands;dependent (less than 25% patient effort) (P)   -       Position (Grooming) supported sitting (P)   -          Row Name 11/08/24 1028                 Lower Body Dressing Assessment/Training     [place] Level (Lower Body Dressing) don;socks;dependent (less than 25% patient effort) (P)   -       Position (Lower Body Dressing) supine (P)   -                       User Key  (r) = Recorded By, (t) = Taken By, (c) = Cosigned By        Initials Name Provider Type      Lissy Gillette, OT Student OT Student                             Obj/Interventions         [facility] Name 11/08/24 1031                 Sensory Assessment (Somatosensory)     Sensory Assessment (Somatosensory) unable/difficult to assess (P)   -BH          Row Name 11/08/24 1031                 Vision Assessment/Intervention     Visual Impairment/Limitations unable/difficult to assess (P)   -BH          Row Name 11/08/24 1031                 Range of Motion Comprehensive     General Range of Motion upper extremity range of motion deficits identified (P)   -       Comment, General Range of Motion ROM tested limited d/t cognitive status (P)   -BH          Row Name 11/08/24 1031                 Strength Comprehensive (MMT)     General Manual Muscle Testing (MMT) Assessment upper extremity strength deficits identified (P)   -       Comment, General Manual Muscle Testing (MMT) Assessment MMT limited d/t cognition, BUE MMT grossly 3/5 (P)   -BH          Row Name 11/08/24 1031                 Balance     Balance Assessment sitting static balance;sitting dynamic balance (P)   -       Static Sitting Balance maximum assist;2-person assist (P)   -       Dynamic Sitting Balance 2-person assist;dependent (P)   -       Position, Sitting Balance unsupported;sitting in chair (P)   -       Balance Interventions sitting;static;dynamic;occupation based/functional task (P)   -                       User Key  (r) = Recorded By, (t) = Taken By, (c) = Cosigned By        Initials Name Provider Type      Lissy Gillette, OT Student OT Student                            Goals/Plan         Row Name 11/08/24 1038                 Bed Mobility Goal 1 (OT)     Activity/Assistive Device (Bed Mobility Goal 1, OT) sit to supine/supine to sit (P)   -       [place] Level/Cues Needed (Bed Mobility Goal 1, OT) maximum assist (25-49% patient effort) (P)   -       Time Frame (Bed Mobility Goal 1, OT) short term goal (STG);3 days (P)   -       Progress/Outcomes (Bed  Mobility Goal 1, OT) new goal (P)   -[facility] Name 11/08/24 1038                 Transfer Goal 1 (OT)     Activity/Assistive Device (Transfer Goal 1, OT) bed-to-chair/chair-to-bed (P)   -       [place] Level/Cues Needed (Transfer Goal 1, OT) maximum assist (25-49% patient effort) (P)   -       Time Frame (Transfer Goal 1, OT) short term goal (STG);3 days (P)   -       Progress/Outcome (Transfer Goal 1, OT) new goal (P)   -[facility] Name 11/08/24 1038                 Dressing Goal 1 (OT)     Activity/Device (Dressing Goal 1, OT) lower body dressing (P)   -       [place]/Cues Needed (Dressing Goal 1, OT) moderate assist (50-74% patient effort) (P)   -       Time Frame (Dressing Goal 1, OT) long term goal (LTG);5 days (P)   -       Progress/Outcome (Dressing Goal 1, OT) new goal (P)   -BH          Row Name 11/08/24 1038                 Therapy Assessment/Plan (OT)     Planned Therapy Interventions (OT) activity tolerance training;adaptive equipment training;BADL retraining;functional balance retraining;occupation/activity based interventions;passive ROM/stretching;patient/caregiver education/training;ROM/therapeutic exercise;strengthening exercise;transfer/mobility retraining (P)   -                    User Key  (r) = Recorded By, (t) = Taken By, (c) = Cosigned By        Initials Name Provider Type      Lissy Gillette, OT Student OT Student                         Clinical Impression         Row Name 11/08/24 1035                 Pain Assessment     Additional Documentation Pain Scale: FACES Pre/Post-Treatment (Group) (P)   -BH          Row Name 11/08/24 1035                 Pain Scale: FACES Pre/Post-Treatment     Pain: FACES Scale, Pretreatment 0-->no hurt (P)   -       Posttreatment Pain Rating 0-->no hurt (P)   -BH          Row Name 11/08/24 1035                 Plan of Care Review     Plan of Care Reviewed With patient (P)   -       Progress no change (P)   -        Outcome Evaluation OT eval complete. Pt presenting below functional baseline w/ bed mobility, transfers, and ADL tasks d/t generalized weakness, balance and cognitive deficits, and decreased activity tolerance. Pt was dep lift for bed > chair transfer this date. Continue IP OT to address current deficits. Recommend SNF at d/c. (P)   -          Row Name 11/08/24 1035                 Therapy Assessment/Plan (OT)     Patient/Family Therapy Goal Statement (OT) Return to OF (P)   -       Rehab Potential (OT) good (P)   -       Criteria for Skilled Therapeutic Interventions Met (OT) yes;meets criteria;skilled treatment is necessary (P)   -       Predicted Duration of Therapy Intervention (OT) 5 days (P)   -          Row Name 11/08/24 1035                 Therapy Plan Review/Discharge Plan (OT)     Anticipated Discharge Disposition (OT) skilled nursing facility (P)   -          Row Name 11/08/24 1035                 Vital Signs     Pre Systolic BP Rehab 163 (P)   -BH       Pre Treatment Diastolic BP 65 (P)   -BH       Post Systolic BP Rehab 143 (P)   -BH       Post Treatment Diastolic BP 64 (P)   -BH       Pretreatment Heart Rate (beats/min) 63 (P)   -BH       Posttreatment Heart Rate (beats/min) 62 (P)   -BH       O2 Delivery Pre Treatment room air (P)   -       O2 Delivery Intra Treatment room air (P)   -BH       Post SpO2 (%) 96 (P)   -       O2 Delivery Post Treatment room air (P)   -BH       Pre Patient Position Supine (P)   -       Intra Patient Position Sitting (P)   -BH       Post Patient Position Sitting (P)   -          Row Name 11/08/24 1035                 Positioning and Restraints     Pre-Treatment Position in bed (P)   -BH       Post Treatment Position chair (P)   -       In Chair reclined;call light within reach;encouraged to call for assist;exit alarm on;RUE elevated;legs elevated;waffle cushion;on mechanical lift sling (P)   -                       User Key  (r) = Recorded By,  (t) = Taken By, (c) = Cosigned By        Initials Name Provider Type      Lissy Gillette, OT Student OT Student                            Outcome Measures         Row Name 11/08/24 1039                 How much help from another is currently needed...     Putting on and taking off regular lower body clothing? 1 (P)   -       Bathing (including washing, rinsing, and drying) 1 (P)   -       Toileting (which includes using toilet bed pan or urinal) 1 (P)   -       Putting on and taking off regular upper body clothing 1 (P)   -       Taking care of personal grooming (such as brushing teeth) 1 (P)   -       Eating meals 1 (P)   -       AM-PAC 6 Clicks Score (OT) 6 (P)   -          Row Name 11/08/24 1039                 Functional Assessment     Outcome Measure Options AM-PAC 6 Clicks Daily Activity (OT) (P)   -                       User Key  (r) = Recorded By, (t) = Taken By, (c) = Cosigned By        Initials Name Provider Type      Lissy Gillette, OT Student OT Student                             Occupational Therapy Education            Title: PT OT SLP Therapies (In Progress)         Topic: Occupational Therapy (In Progress)         Point: ADL training (Done)         Description:   Instruct learner(s) on proper safety adaptation and remediation techniques during self care or transfers.   Instruct in proper use of assistive devices.                       Learning Progress Summary             Patient Acceptance, E, VU by  at 11/8/2024 1040                            Point: Home exercise program (Not Started)         Description:   Instruct learner(s) on appropriate technique for monitoring, assisting and/or progressing therapeutic exercises/activities.                       Learner Progress:  Not documented in this visit.                  Point: Precautions (Done)         Description:   Instruct learner(s) on prescribed precautions during self-care and functional transfers.                        Learning Progress Summary             Patient Acceptance, E, VU by  at 11/8/2024 1040                            Point: Body mechanics (Done)         Description:   Instruct learner(s) on proper positioning and spine alignment during self-care, functional mobility activities and/or exercises.                       Learning Progress Summary             Patient Acceptance, E, VU by  at 11/8/2024 1040                                                User Key         Initials Effective Dates Name Provider Type Discipline      08/08/24 -  Lissy Gillette OT Student OT Student OT                          OT Recommendation and Plan  Planned Therapy Interventions (OT): (P) activity tolerance training, adaptive equipment training, BADL retraining, functional balance retraining, occupation/activity based interventions, passive ROM/stretching, patient/caregiver education/training, ROM/therapeutic exercise, strengthening exercise, transfer/mobility retraining  Plan of Care Review  Plan of Care Reviewed With: (P) patient  Progress: (P) no change  Outcome Evaluation: (P) OT eval complete. Pt presenting below functional baseline w/ bed mobility, transfers, and ADL tasks d/t generalized weakness, balance and cognitive deficits, and decreased activity tolerance. Pt was dep lift for bed > chair transfer this date. Continue IP OT to address current deficits. Recommend SNF at d/c.      Time Calculation:   Evaluation Complexity (OT)  Review Occupational Profile/Medical/Therapy History Complexity: (P) brief/low complexity  Assessment, Occupational Performance/Identification of Deficit Complexity: (P) 3-5 performance deficits  Clinical Decision Making Complexity (OT): (P) problem focused assessment/low complexity  Overall Complexity of Evaluation (OT): (P) low complexity       Time Calculation- OT         Row Name 11/08/24 1041                       Time Calculation- OT     OT Start Time 0942 (P)   -         OT Received  On 11/08/24 (P)   -         OT Goal Re-Cert Due Date 11/18/24 (P)   -                 Timed Charges     50515 - OT Therapeutic Activity Minutes 10 (P)   -                 Untimed Charges     OT Eval/Re-eval Minutes 46 (P)   -                 Total Minutes     Timed Charges Total Minutes 10 (P)   -         Untimed Charges Total Minutes 46 (P)   -          Total Minutes 56 (P)   -                      User Key  (r) = Recorded By, (t) = Taken By, (c) = Cosigned By        Initials Name Provider Type      Lissy Gillette, OT Student OT Student                       Therapy Charges for Today         Code Description Service Date Service Provider Modifiers Qty     40112050033 HC OT THERAPEUTIC ACT EA 15 MIN 11/8/2024 Lissy Gillette, OT Student RUBY GOVEA 1     11207055476 HC OT EVAL LOW COMPLEXITY 4 11/8/2024 Lissy Gillette OT Student JEFERSON, KX 1                   Lissy Gillette OT Student                    11/8/2024            Cosigned by: Sonia Laureano OT at 11/08/24 1054     Revision History

## 2024-11-08 NOTE — CONSULTS
"                  Clinical Nutrition     Patient Name: Omkar Nava  YOB: 1955  MRN: [id number]  Date of Encounter: 11/08/24 13:50 EST  Admission date: 11/7/2024  Reason for Visit: Identified at risk by screening criteria, MST score 2+, Consult, EN, Reduced oral intake, \"Unsure\" unintentional weight loss    Assessment   Nutrition Assessment   Admission Diagnosis:  JAYY (acute kidney injury) [N17.9]    Problem List:    JAYY (acute kidney injury)    Type 2 diabetes mellitus    Essential hypertension    Anemia of chronic disease    Dementia    Severe vascular dementia without behavioral disturbance, psychotic disturbance, mood disturbance, or anxiety    Generalized weakness    Hyperkalemia      PMH:   He  has a past medical history of Anemia, Dementia, Diabetes mellitus, Dysphagia, GERD (gastroesophageal reflux disease), History of alcohol abuse, History of cocaine use, History of marijuana use, Hypertension, Osteomyelitis, Poor historian, and Visual impairment.    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    Applicable Nutrition History:   CKD 2  T2DM, insulin use  HFpEF  Seizure disorder  Vascular dementia w/ agitation  Dysphagia s/p PEG (4/2024)    (11/8) SLP Diet Recommendation: NPO, long term alternate methods of nutrition/hydration     Labs    Labs Reviewed: Yes    Results from last 7 days   Lab Units 11/08/24  1256 11/08/24  0752 11/07/24  2313 11/07/24  1729 11/07/24  1618   GLUCOSE mg/dL 99 86  --   --  104*   BUN mg/dL 31* 32*  --   --  42*   CREATININE mg/dL 1.06 1.05  --   --  1.39*   SODIUM mmol/L 137 133*  --   --  133*   CHLORIDE mmol/L 104 101  --   --  98   POTASSIUM mmol/L 6.2* 6.0* 5.8*   < > 6.3*   MAGNESIUM mg/dL  --   --   --   --  2.2   ALT (SGPT) U/L  --   --   --   --  20    < > = values " "in this interval not displayed.       Results from last 7 days   Lab Units 11/07/24  1618   ALBUMIN g/dL 3.8       Results from last 7 days   Lab Units 11/08/24  1249 11/08/24  1200 11/08/24  1037 11/08/24  0907 11/08/24  0806 11/08/24  0710   GLUCOSE mg/dL 102 102 89 88 100 102     Lab Results   Lab Value Date/Time    HGBA1C 5.60 09/15/2024 0658    HGBA1C 7.00 (H) 10/16/2022 0432    HGBA1C 8.7 (H) 06/25/2022 0242    HGBA1C 13.4 04/14/2022 1058       Medications    Medications Reviewed: yes    Scheduled Meds:ARIPiprazole, 5 mg, Per G Tube, Daily  brimonidine, 1 drop, Both Eyes, TID  cholecalciferol, 400 Units, Oral, Daily  FLUoxetine, 20 mg, Oral, Daily  insulin lispro, 2-7 Units, Subcutaneous, 4x Daily AC & at Bedtime  lansoprazole, 30 mg, Nasogastric, Q AM  palliative care oral rinse, 5 mL, Mouth/Throat, 4x Daily  QUEtiapine, 25 mg, Per G Tube, Nightly  senna-docusate sodium, 2 tablet, Oral, BID  sodium chloride, 10 mL, Intravenous, Q12H  terazosin, 5 mg, Per G Tube, Q12H  timolol, 1 drop, Both Eyes, BID  Valproic Acid, 150 mg, Nasogastric, Q8H      Continuous Infusions:   PRN Meds:.  acetaminophen    senna-docusate sodium **AND** polyethylene glycol **AND** bisacodyl **AND** bisacodyl    dextrose    dextrose    glucagon (human recombinant)    hydrOXYzine    ipratropium-albuterol    nitroglycerin    sodium chloride    sodium chloride    sodium chloride    Intake/Ouptut 24 hrs (0701 - 0700)   I&O's Reviewed: yes      Anthropometrics     Height: Height: 172.7 cm (68\")  Last Filed Weight: Weight: 82.6 kg (182 lb) (11/07/24 2234)  Method: Weight Method: Stated  BMI: BMI (Calculated): 27.7    UBW: 4/1/224 200#  Weight change: No significant changes    Nutrition Focused Physical Exam     Date: 11/08/24     Unable to perform due to Defer pending indication     Subjective   Reported/Observed/Food/Nutrition Related History:     11/08/24  Patient is a resident at [facility]. He does have a PO diet there and EN regimen. " "Per facility, patient's EN regimen is Glucerna @ 55ml/hr + 85ml/hr FWF. Noted patient normally does not eat via PO very well. Reported a decrease appetite lately. Consult received for EN regimen while in house. Will continue previous regimen from recent admission (see recs below).    **Addendum: Per assigned RN, hospice?? asking for lower K+ formula. Will modify to Novasource Renal.     Needs Assessment   Date: 11/08/24     Height used:Height: 172.7 cm (68\")  Weights used: 77kg     Estimated Calorie needs: ~1900 Kcal/day  Method:  25-30 Kcals/KG = 6433-1114  Method:  1-1.2 x MSJ = 2589-8581    Estimated Protein needs: ~90 g PRO/day  Method: 1-1.2 g/Kg = 77-92g pro    Estimated Fluid needs: ~ ml/day   Per clinical status    Current Nutrition Prescription   PO: NPO Diet NPO Type: Strict NPO  Oral Nutrition Supplement: n/a  Intake: N/A    Assessment & Plan   Nutrition Diagnosis   Date: 11/08/24  Updated:   Problem Swallowing difficulty    Etiology Oropharyngeal dysphagia   Signs/Symptoms PEG in place   Status: Active    Goal:   Nutrition to support treatment and Initiate EN, Establish EN tolerance, Tolerate EN at goal, Deliver estimated needs    Nutrition Intervention      Follow treatment progress, Care plan reviewed, Nutrition support order placed    Patient does not meet malnutrition criteria at this time.    Nutrition POC  Modifying EN regimen as requested per hospice?: Initiate Novasource Renal @ 26ml/hr, increasing 10ml q6h as tolerated until reaching goal rate of 46ml/hr + 40ml/hr FWF  Will adjust EN/FWF regimen PRN  Recommend monitoring and replacing lytes PRN  EN @ goal over 22hr to supply:  Goal Volume 1012 mL/day     Flush Volume 880 mL/day     Energy 2024 Kcal/day 101 % Est Need   Protein 92 g/day 102 % Est Need   Fiber 0 g/day     Water in   mL     Total Water 1609 mL     Meet DRI Y      K+ = 24 mEq (-39 mEq difference)    Monitoring/Evaluation:   Per protocol, I&O, EN delivery/tolerance, Weight, GI " status, Symptoms, POC/GOC, Swallow function    Marija Yo MS,RD,LD  Time Spent: 35min

## 2024-11-09 LAB
ANION GAP SERPL CALCULATED.3IONS-SCNC: 10 MMOL/L (ref 5–15)
ANION GAP SERPL CALCULATED.3IONS-SCNC: 7 MMOL/L (ref 5–15)
BASOPHILS # BLD AUTO: 0.03 10*3/MM3 (ref 0–0.2)
BASOPHILS NFR BLD AUTO: 0.5 % (ref 0–1.5)
BUN SERPL-MCNC: 23 MG/DL (ref 8–23)
BUN SERPL-MCNC: 23 MG/DL (ref 8–23)
BUN/CREAT SERPL: 24.2 (ref 7–25)
BUN/CREAT SERPL: 25.3 (ref 7–25)
CALCIUM SPEC-SCNC: 9.4 MG/DL (ref 8.6–10.5)
CALCIUM SPEC-SCNC: 9.7 MG/DL (ref 8.6–10.5)
CHLORIDE SERPL-SCNC: 100 MMOL/L (ref 98–107)
CHLORIDE SERPL-SCNC: 101 MMOL/L (ref 98–107)
CO2 SERPL-SCNC: 24 MMOL/L (ref 22–29)
CO2 SERPL-SCNC: 25 MMOL/L (ref 22–29)
CREAT SERPL-MCNC: 0.91 MG/DL (ref 0.76–1.27)
CREAT SERPL-MCNC: 0.95 MG/DL (ref 0.76–1.27)
DEPRECATED RDW RBC AUTO: 48.6 FL (ref 37–54)
EGFRCR SERPLBLD CKD-EPI 2021: 87.7 ML/MIN/1.73
EGFRCR SERPLBLD CKD-EPI 2021: 92.4 ML/MIN/1.73
EOSINOPHIL # BLD AUTO: 0.12 10*3/MM3 (ref 0–0.4)
EOSINOPHIL NFR BLD AUTO: 2.2 % (ref 0.3–6.2)
ERYTHROCYTE [DISTWIDTH] IN BLOOD BY AUTOMATED COUNT: 14.1 % (ref 12.3–15.4)
GLUCOSE BLDC GLUCOMTR-MCNC: 137 MG/DL (ref 70–130)
GLUCOSE BLDC GLUCOMTR-MCNC: 143 MG/DL (ref 70–130)
GLUCOSE BLDC GLUCOMTR-MCNC: 163 MG/DL (ref 70–130)
GLUCOSE BLDC GLUCOMTR-MCNC: 207 MG/DL (ref 70–130)
GLUCOSE SERPL-MCNC: 160 MG/DL (ref 65–99)
GLUCOSE SERPL-MCNC: 193 MG/DL (ref 65–99)
HCT VFR BLD AUTO: 26.6 % (ref 37.5–51)
HGB BLD-MCNC: 8.5 G/DL (ref 13–17.7)
IMM GRANULOCYTES # BLD AUTO: 0.01 10*3/MM3 (ref 0–0.05)
IMM GRANULOCYTES NFR BLD AUTO: 0.2 % (ref 0–0.5)
LYMPHOCYTES # BLD AUTO: 1.48 10*3/MM3 (ref 0.7–3.1)
LYMPHOCYTES NFR BLD AUTO: 26.8 % (ref 19.6–45.3)
MAGNESIUM SERPL-MCNC: 1.7 MG/DL (ref 1.6–2.4)
MCH RBC QN AUTO: 29.8 PG (ref 26.6–33)
MCHC RBC AUTO-ENTMCNC: 32 G/DL (ref 31.5–35.7)
MCV RBC AUTO: 93.3 FL (ref 79–97)
MONOCYTES # BLD AUTO: 0.53 10*3/MM3 (ref 0.1–0.9)
MONOCYTES NFR BLD AUTO: 9.6 % (ref 5–12)
NEUTROPHILS NFR BLD AUTO: 3.36 10*3/MM3 (ref 1.7–7)
NEUTROPHILS NFR BLD AUTO: 60.7 % (ref 42.7–76)
NRBC BLD AUTO-RTO: 0 /100 WBC (ref 0–0.2)
PHOSPHATE SERPL-MCNC: 3 MG/DL (ref 2.5–4.5)
PLATELET # BLD AUTO: 306 10*3/MM3 (ref 140–450)
PMV BLD AUTO: 10.7 FL (ref 6–12)
POTASSIUM SERPL-SCNC: 4.8 MMOL/L (ref 3.5–5.2)
POTASSIUM SERPL-SCNC: 5.1 MMOL/L (ref 3.5–5.2)
QT INTERVAL: 450 MS
RBC # BLD AUTO: 2.85 10*6/MM3 (ref 4.14–5.8)
SODIUM SERPL-SCNC: 132 MMOL/L (ref 136–145)
SODIUM SERPL-SCNC: 135 MMOL/L (ref 136–145)
WBC NRBC COR # BLD AUTO: 5.53 10*3/MM3 (ref 3.4–10.8)

## 2024-11-09 PROCEDURE — 80048 BASIC METABOLIC PNL TOTAL CA: CPT | Performed by: STUDENT IN AN ORGANIZED HEALTH CARE EDUCATION/TRAINING PROGRAM

## 2024-11-09 PROCEDURE — 83735 ASSAY OF MAGNESIUM: CPT | Performed by: STUDENT IN AN ORGANIZED HEALTH CARE EDUCATION/TRAINING PROGRAM

## 2024-11-09 PROCEDURE — 63710000001 INSULIN REGULAR HUMAN PER 5 UNITS: Performed by: STUDENT IN AN ORGANIZED HEALTH CARE EDUCATION/TRAINING PROGRAM

## 2024-11-09 PROCEDURE — 84100 ASSAY OF PHOSPHORUS: CPT | Performed by: STUDENT IN AN ORGANIZED HEALTH CARE EDUCATION/TRAINING PROGRAM

## 2024-11-09 PROCEDURE — 92610 EVALUATE SWALLOWING FUNCTION: CPT

## 2024-11-09 PROCEDURE — 99232 SBSQ HOSP IP/OBS MODERATE 35: CPT | Performed by: STUDENT IN AN ORGANIZED HEALTH CARE EDUCATION/TRAINING PROGRAM

## 2024-11-09 PROCEDURE — 85025 COMPLETE CBC W/AUTO DIFF WBC: CPT | Performed by: STUDENT IN AN ORGANIZED HEALTH CARE EDUCATION/TRAINING PROGRAM

## 2024-11-09 PROCEDURE — 87040 BLOOD CULTURE FOR BACTERIA: CPT | Performed by: STUDENT IN AN ORGANIZED HEALTH CARE EDUCATION/TRAINING PROGRAM

## 2024-11-09 PROCEDURE — 82948 REAGENT STRIP/BLOOD GLUCOSE: CPT

## 2024-11-09 RX ORDER — AMLODIPINE BESYLATE 10 MG/1
10 TABLET ORAL
Status: DISCONTINUED | OUTPATIENT
Start: 2024-11-09 | End: 2024-11-15 | Stop reason: HOSPADM

## 2024-11-09 RX ADMIN — INSULIN HUMAN 2 UNITS: 100 INJECTION, SOLUTION PARENTERAL at 17:36

## 2024-11-09 RX ADMIN — Medication 5 ML: at 21:08

## 2024-11-09 RX ADMIN — VALPROIC ACID 150 MG: 250 SOLUTION ORAL at 05:01

## 2024-11-09 RX ADMIN — Medication 5 ML: at 08:32

## 2024-11-09 RX ADMIN — TIMOLOL MALEATE 1 DROP: 5 SOLUTION/ DROPS OPHTHALMIC at 20:55

## 2024-11-09 RX ADMIN — BRIMONIDINE TARTRATE 1 DROP: 2 SOLUTION/ DROPS OPHTHALMIC at 20:55

## 2024-11-09 RX ADMIN — ARIPIPRAZOLE 5 MG: 5 TABLET ORAL at 10:31

## 2024-11-09 RX ADMIN — FLUOXETINE HYDROCHLORIDE 20 MG: 20 SOLUTION ORAL at 10:30

## 2024-11-09 RX ADMIN — AMLODIPINE BESYLATE 10 MG: 10 TABLET ORAL at 14:23

## 2024-11-09 RX ADMIN — VALPROIC ACID 150 MG: 250 SOLUTION ORAL at 14:21

## 2024-11-09 RX ADMIN — INSULIN HUMAN 3 UNITS: 100 INJECTION, SOLUTION PARENTERAL at 12:12

## 2024-11-09 RX ADMIN — Medication 10 ML: at 20:56

## 2024-11-09 RX ADMIN — Medication 10 ML: at 08:28

## 2024-11-09 RX ADMIN — BRIMONIDINE TARTRATE 1 DROP: 2 SOLUTION/ DROPS OPHTHALMIC at 08:28

## 2024-11-09 RX ADMIN — LANSOPRAZOLE 30 MG: 15 TABLET, ORALLY DISINTEGRATING, DELAYED RELEASE ORAL at 05:01

## 2024-11-09 RX ADMIN — Medication 400 UNITS: at 10:31

## 2024-11-09 RX ADMIN — SENNOSIDES AND DOCUSATE SODIUM 2 TABLET: 50; 8.6 TABLET ORAL at 20:55

## 2024-11-09 RX ADMIN — TIMOLOL MALEATE 1 DROP: 5 SOLUTION/ DROPS OPHTHALMIC at 08:27

## 2024-11-09 RX ADMIN — BRIMONIDINE TARTRATE 1 DROP: 2 SOLUTION/ DROPS OPHTHALMIC at 16:05

## 2024-11-09 RX ADMIN — QUETIAPINE FUMARATE 25 MG: 25 TABLET ORAL at 20:55

## 2024-11-09 RX ADMIN — SODIUM ZIRCONIUM CYCLOSILICATE 10 G: 10 POWDER, FOR SUSPENSION ORAL at 08:27

## 2024-11-09 RX ADMIN — VALPROIC ACID 150 MG: 250 SOLUTION ORAL at 21:08

## 2024-11-09 RX ADMIN — TERAZOSIN HYDROCHLORIDE 5 MG: 5 CAPSULE ORAL at 10:32

## 2024-11-09 RX ADMIN — TERAZOSIN HYDROCHLORIDE 5 MG: 5 CAPSULE ORAL at 20:55

## 2024-11-09 RX ADMIN — Medication 5 ML: at 17:36

## 2024-11-09 RX ADMIN — Medication 5 ML: at 12:12

## 2024-11-09 NOTE — THERAPY EVALUATION
Acute Care - Speech Language Pathology   Swallow Re-Evaluation [facility]  Clinical Swallow Evaluation         Patient Name: Omkar Nava  : 1955  MRN: [id number]  Today's Date: 2024               Admit Date: 2024    Visit Dx:     ICD-10-CM ICD-9-CM   1. Acute kidney injury  N17.9 584.9   2. Hyperkalemia  E87.5 276.7   3. Generalized weakness  R53.1 780.79   4. History of diabetes mellitus  Z86.39 V12.29   5. Vascular dementia, unspecified dementia severity, unspecified whether behavioral, psychotic, or mood disturbance or anxiety  F01.50 290.40   6. 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, without long-term current use of insulin    Acute osteomyelitis of left foot    Type 2 diabetes mellitus    Essential hypertension    Psychotic disorder    Neurocognitive disorder    S/P transmetatarsal amputation of foot, left    Abscess of left foot    Anemia of chronic disease    Aspiration pneumonia    Cervical spine pain    Chronic kidney disease    Closed head injury without loss of consciousness    Dementia    Dental cavities    Diarrhea of presumed infectious origin    Displaced fracture of proximal phalanx of left great toe, initial encounter for open fracture    Dysphagia    Erectile dysfunction    Generalized muscle weakness    Hallucinations    Hypertensive heart and chronic kidney disease 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 failure, with stage 1 through stage 4 chronic kidney disease, or unspecified chronic kidney disease    Insomnia due to medical condition    Iron deficiency anemia    Laceration without foreign body, left foot, subsequent encounter    Personal history of Methicillin resistant Staphylococcus aureus infection    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    Protein calorie malnutrition    Simple chronic bronchitis    Tobacco use    Type 2  diabetes mellitus with diabetic neuropathy, unspecified    Type 2 diabetes mellitus with diabetic chronic kidney disease    Acute type 1 respiratory failure    Severe vascular dementia without behavioral disturbance, psychotic disturbance, mood disturbance, or anxiety    JAYY (acute kidney injury)    Generalized weakness    Hyperkalemia     Past Medical History:   Diagnosis Date    Anemia     Dementia     Diabetes mellitus     Dysphagia     GERD (gastroesophageal reflux disease)     History of alcohol abuse     History of cocaine use     History of marijuana use     Hypertension     Osteomyelitis     Poor historian     records obtained from nursing home records & his family    Visual impairment      Past Surgical History:   Procedure Laterality Date    AMPUTATION FOOT Left 10/18/2022    Procedure: PARTIAL FIRST RAY AMPUTATION LEFT;  Surgeon: Yeison Petty MD;  Location:  [facility];  Service: Orthopedics;  Laterality: Left;    AMPUTATION FOOT Left 12/5/2022    Procedure: Transmetatarsal of Left Foot;  Surgeon: Yeison Petty MD;  Location:  [facility];  Service: Orthopedics;  Laterality: Left;    ENDOSCOPY N/A 3/14/2024    Procedure: ESOPHAGOGASTRODUODENOSCOPY WITH JEJUNAL TUBE INSERTION AT BEDSIDE;  Surgeon: Brunner, Mark I, MD;  Location:  [facility];  Service: Gastroenterology;  Laterality: N/A;    ENDOSCOPY W/ PEG TUBE PLACEMENT N/A 4/1/2024    Procedure: ESOPHAGOGASTRODUODENOSCOPY WITH PERCUTANEOUS ENDOSCOPIC GASTROSTOMY TUBE INSERTION;  Surgeon: Brunner, Mark I, MD;  Location:  [facility];  Service: Gastroenterology;  Laterality: N/A;  EGD with PEG placement.  Secured at 3.5 cm    EYE SURGERY         SLP Recommendation and Plan  SLP Swallowing Diagnosis: oral dysphagia, suspected pharyngeal dysphagia (11/09/24 1550)  SLP Diet Recommendation: NPO, long term alternate methods of nutrition/hydration (11/09/24 1550)     SLP Rec. for Method of Medication Administration: meds via alternate route (11/09/24  1550)     Monitor for Signs of Aspiration: yes, notify SLP if any concerns (11/09/24 1550)  Recommended Diagnostics: reassess via clinical swallow evaluation (11/09/24 1550)  Swallow Criteria for Skilled Therapeutic Interventions Met: demonstrates skilled criteria (11/09/24 1550)  Anticipated Discharge Disposition (SLP): long term acute care facility (11/09/24 1550)  Rehab Potential/Prognosis, Swallowing: re-evaluate goals as necessary (11/09/24 1550)     Predicted Duration Therapy Intervention (Days): 1 week (11/09/24 1550)  Oral Care Recommendations: Oral Care BID/PRN, Suction toothbrush (11/09/24 1550)                                               SWALLOW EVALUATION (Last 72 Hours)       SLP Adult Swallow Evaluation       Row Name 11/09/24 1550 11/08/24 0900                Rehab Evaluation    Document Type re-evaluation  - evaluation  -RS       Subjective Information no complaints  - no complaints  -RS       Patient Observations cooperative;lethargic  - decreased LOC  -RS       Patient/Family/Caregiver Comments/Observations No family present  - no family present  -RS       Patient Effort adequate  - poor  -RS       Comment -- pt awake but minimally participatory. Pt appears changed from previous admission. RN alerted, notified, and at bedside. She states that MD is also aware  -RS       Symptoms Noted During/After Treatment none  - none  -RS       Oral Care -- oral rinse provided  -RS          General Information    Patient Profile Reviewed yes  - yes  -RS       Pertinent History Of Current Problem See initial eval  - Pt is a 69 yoM w PMHx HTN, DM2, CKD, vascular dementia, dysphagia and chronic iron deficiency anemia presents to the ED accompanied by daughter due to generalized weakness.  -RS       Current Method of Nutrition NPO;gastrostomy feedings  - NPO;gastrostomy feedings  -RS       Precautions/Limitations, Vision vision impairment, bilaterally  - vision impairment, bilaterally  -RS        Precautions/Limitations, Hearing WFL;for purposes of eval  - WFL;for purposes of eval  -       Prior Level of Function-Communication cognitive-linguistic impairment  - cognitive-linguistic impairment  -RS       Prior Level of Function-Swallowing thin liquids;puree;alternative feeding method;other (see comments)  [facility] 10/29/2024: mod-severe oral impairment, mild pharyngeal w recs  - thin liquids;puree;alternative feeding method;other (see comments)  [facility] 10/29/2024: mod-severe oral impairment, mild pharyngeal w recs  -RS       Plans/Goals Discussed with patient  - patient  -RS       Barriers to Rehab previous functional deficit;cognitive status  - previous functional deficit  -RS       Patient's Goals for Discharge patient did not state  - patient did not state  -RS          Pain    Additional Documentation Pain Scale: FACES Pre/Post-Treatment (Group)  - Pain Scale: FACES Pre/Post-Treatment (Group)  -          Pain Scale: FACES Pre/Post-Treatment    Pain: FACES Scale, Pretreatment 0-->no hurt  - 0-->no hurt  -RS       Posttreatment Pain Rating 0-->no hurt  - 0-->no hurt  -RS          Oral Motor Structure and Function    Oral Lesions or Structural Abnormalities and/or variants Open mouth posture  - open mouth posture  -RS       Dentition Assessment missing teeth;teeth are in poor condition  - missing teeth;teeth are in poor condition  -RS       Secretion Management WNL/WFL  - WNL/WFL  -RS       Mucosal Quality -- dry  -RS       Volitional Swallow -- unable to elicit  -RS       Volitional Cough -- unable to elicit  -RS          Oral Musculature and Cranial Nerve Assessment    Oral Motor General Assessment other (see comments)  Pt u/a to follow OM commands  - --       Oral Motor, Comment -- pt u/a to follow directions to assess in isolation  -RS          General Eating/Swallowing Observations    Respiratory Support Currently in Use room air  - room air  -RS       Eating/Swallowing Skills  fed by SLP;unable to perform self-feeding  - fed by SLP  -RS       Positioning During Eating upright 90 degree;upright in bed  - upright in bed  -RS       Utensils Used spoon;cup  - other (see comments)  ice chip presented by hand  -RS       Consistencies Trialed ice chips;thin liquids  - ice chips  -RS          Respiratory    Respiratory Status -- WFL  -RS          Clinical Swallow Eval    Oral Prep Phase impaired  - impaired  -RS       Oral Transit impaired  - impaired  -RS       Oral Residue -- WFL  -RS       Pharyngeal Phase suspected pharyngeal impairment  - suspected pharyngeal impairment  -RS       Esophageal Phase -- --  adequate PO not administered to determine  -RS       Clinical Swallow Evaluation Summary Pt lethargic, cues required t/o eval for pt to remain awake. Anterior loss, oral holding & delayed initiation of bolus transit noted. Delayed cough w/ thin liquid trials. Pt declined NTL or pureed trials. Safest recommendation cont NPO, SLP will f/u for re-eval & monitor for readiness to participate in instrumental assessment `  -MH --          Oral Prep Concerns    Oral Prep Concerns reduced lip opening;incomplete or weak lip closure around spoon;anterior loss;oral holding  - oral holding;inefficient mastication;reduced lip opening;incomplete or weak lip closure around spoon  -RS       Oral Holding thin  - other (see comments)  ice  -RS       Inefficient Mastication -- other (see comments)  ice  -RS       Reduced Lip Opening thin  - other (see comments)  ice  -RS       Incomplete or Weak Lip Closure Around Spoon thin  - other (see comments)  ice  -RS       Anterior Loss thin  - --          Oral Transit Concerns    Oral Transit Concerns delayed initiation of bolus transit  - unable to initiate oral transit  -RS       Delayed Intiation of Bolus Transit [facility] --          Pharyngeal Phase Concerns    Pharyngeal Phase Concerns cough  - other (see comments)  Extremely delayed  swallow  -RS       Cough thin  - --       Pharyngeal Phase Concerns, Comment -- Pt is u/a to swallow on command. He is extremely lethargic so suspect that level of alertness is larger barrier to safe PO intake than a pharyngeal component. Continue PEG for nutritional support, SLP will re-assess when alertness improves  -RS          SLP Evaluation Clinical Impression    SLP Swallowing Diagnosis oral dysphagia;suspected pharyngeal dysphagia  - suspect chronic;oral dysphagia;R/O pharyngeal dysphagia;unable to assess  -RS       Functional Impact risk of aspiration/pneumonia;risk of malnutrition  - risk of aspiration/pneumonia;risk of malnutrition  -RS       Rehab Potential/Prognosis, Swallowing re-evaluate goals as necessary  - re-evaluate goals as necessary  -RS       Swallow Criteria for Skilled Therapeutic Interventions Met demonstrates skilled criteria  - demonstrates skilled criteria  -RS          Recommendations    Therapy Frequency (Swallow) -- PRN;5 days per week  -       Predicted Duration Therapy Intervention (Days) 1 week  - 1 week  -RS       SLP Diet Recommendation NPO;long term alternate methods of nutrition/hydration  - NPO;long term alternate methods of nutrition/hydration  -RS       Recommended Diagnostics reassess via clinical swallow evaluation  - reassess via clinical swallow evaluation;other (see comments)  when alertness has improved  -RS       Oral Care Recommendations Oral Care BID/PRN;Suction toothbrush  - Oral Care BID/PRN;Suction toothbrush  -RS       SLP Rec. for Method of Medication Administration meds via alternate route  - meds via alternate route  -       Monitor for Signs of Aspiration yes;notify SLP if any concerns  - yes;notify SLP if any concerns  -RS       Anticipated Discharge Disposition (SLP) long term acute care facility  - long term acute care facility  -RS                 User Key  (r) = Recorded By, (t) = Taken By, (c) = Cosigned By      Initials  Name Effective Dates     Kathy Gonzales MS CCC-SLP 05/12/23 -     RS Sawyer Valle MS CCC-SLP 09/14/23 -                     EDUCATION  The patient has been educated in the following areas:   Dysphagia (Swallowing Impairment) Oral Care/Hydration NPO rationale.                Time Calculation:    Time Calculation- SLP       Row Name 11/09/24 1642             Time Calculation- SLP    SLP Start Time 1550  -      SLP Received On 11/09/24  -         Untimed Charges    22981-CS Eval Oral Pharyng Swallow Minutes 41  -         Total Minutes    Untimed Charges Total Minutes 41  -       Total Minutes 41  -                User Key  (r) = Recorded By, (t) = Taken By, (c) = Cosigned By      Initials Name Provider Type     Kathy Gonzales, MS CCC-SLP Speech and Language Pathologist                    Therapy Charges for Today       Code Description Service Date Service Provider Modifiers Qty    54166823167 HC ST EVAL ORAL PHARYNG SWALLOW 3 11/9/2024 Kathy Gonzales MS CCC-SLP GN, KX 1                 MS JIM Cazares  11/9/2024

## 2024-11-09 NOTE — PLAN OF CARE
Problem: Adult Inpatient Plan of Care  Goal: Plan of Care Review  Outcome: Progressing  Goal: Patient-Specific Goal (Individualized)  Outcome: Progressing  Goal: Absence of Hospital-Acquired Illness or Injury  Outcome: Progressing  Intervention: Identify and Manage Fall Risk  Recent Flowsheet Documentation  Taken 11/9/2024 0400 by Celeste Salinas RN  Safety Promotion/Fall Prevention:   activity supervised   assistive device/personal items within reach   clutter free environment maintained   fall prevention program maintained   lighting adjusted   nonskid shoes/slippers when out of bed   room organization consistent   safety round/check completed  Taken 11/9/2024 0200 by Celeste Salinas RN  Safety Promotion/Fall Prevention:   activity supervised   assistive device/personal items within reach   clutter free environment maintained   fall prevention program maintained   lighting adjusted   nonskid shoes/slippers when out of bed   room organization consistent   safety round/check completed  Taken 11/9/2024 0000 by Celeste Salinas RN  Safety Promotion/Fall Prevention:   activity supervised   assistive device/personal items within reach   clutter free environment maintained   fall prevention program maintained   lighting adjusted   nonskid shoes/slippers when out of bed   room organization consistent   safety round/check completed  Taken 11/8/2024 2200 by Celeste Salinas RN  Safety Promotion/Fall Prevention:   activity supervised   assistive device/personal items within reach   clutter free environment maintained   fall prevention program maintained   lighting adjusted   nonskid shoes/slippers when out of bed   room organization consistent   safety round/check completed  Taken 11/8/2024 2000 by Celeste Salinas RN  Safety Promotion/Fall Prevention:   activity supervised   assistive device/personal items within reach   clutter free environment maintained   fall prevention program maintained    lighting adjusted   nonskid shoes/slippers when out of bed   room organization consistent   safety round/check completed  Intervention: Prevent Skin Injury  Recent Flowsheet Documentation  Taken 11/9/2024 0400 by Celeste Salinas RN  Body Position: patient/family refused  Skin Protection:   drying agents applied   incontinence pads utilized   skin sealant/moisture barrier applied   transparent dressing maintained  Taken 11/9/2024 0200 by Celeste Salinas RN  Body Position: patient/family refused  Skin Protection:   drying agents applied   incontinence pads utilized   skin sealant/moisture barrier applied   transparent dressing maintained  Taken 11/9/2024 0000 by Celeste Salinas RN  Body Position: turned  Skin Protection:   drying agents applied   incontinence pads utilized   skin sealant/moisture barrier applied   transparent dressing maintained  Taken 11/8/2024 2200 by Celeste Salinas RN  Body Position: left  Skin Protection:   incontinence pads utilized   skin sealant/moisture barrier applied   transparent dressing maintained  Taken 11/8/2024 2000 by Celeste Salinas RN  Body Position: right  Skin Protection:   incontinence pads utilized   drying agents applied   skin sealant/moisture barrier applied   transparent dressing maintained  Intervention: Prevent and Manage VTE (Venous Thromboembolism) Risk  Recent Flowsheet Documentation  Taken 11/8/2024 2000 by Celeste Salinas RN  VTE Prevention/Management:   bilateral   SCDs (sequential compression devices) on  Intervention: Prevent Infection  Recent Flowsheet Documentation  Taken 11/9/2024 0400 by Celeste Salinas RN  Infection Prevention:   environmental surveillance performed   equipment surfaces disinfected   hand hygiene promoted   single patient room provided  Taken 11/9/2024 0200 by Celeste Salinas RN  Infection Prevention:   environmental surveillance performed   equipment surfaces disinfected   hand hygiene  promoted   single patient room provided  Taken 11/9/2024 0000 by Celeste Salinas RN  Infection Prevention:   environmental surveillance performed   equipment surfaces disinfected   hand hygiene promoted   single patient room provided  Taken 11/8/2024 2200 by Celeste Salinas RN  Infection Prevention:   environmental surveillance performed   equipment surfaces disinfected   hand hygiene promoted   single patient room provided  Taken 11/8/2024 2000 by Celeste Salinas RN  Infection Prevention:   environmental surveillance performed   equipment surfaces disinfected   hand hygiene promoted   single patient room provided  Goal: Optimal Comfort and Wellbeing  Outcome: Progressing  Intervention: Provide Person-Centered Care  Recent Flowsheet Documentation  Taken 11/8/2024 2000 by Celeste Salinas RN  Trust Relationship/Rapport: care explained  Goal: Readiness for Transition of Care  Outcome: Progressing     Problem: Fall Injury Risk  Goal: Absence of Fall and Fall-Related Injury  Outcome: Progressing  Intervention: Identify and Manage Contributors  Recent Flowsheet Documentation  Taken 11/9/2024 0400 by Celeste Salinas RN  Medication Review/Management: medications reviewed  Self-Care Promotion: BADL personal objects within reach  Taken 11/9/2024 0200 by Celeste Salinas RN  Medication Review/Management: medications reviewed  Self-Care Promotion: BADL personal objects within reach  Taken 11/9/2024 0000 by Celeste aSlinas RN  Medication Review/Management: medications reviewed  Self-Care Promotion: BADL personal routines maintained  Taken 11/8/2024 2200 by Celeste Salinas RN  Medication Review/Management: medications reviewed  Self-Care Promotion: BADL personal routines maintained  Taken 11/8/2024 2000 by Celeste Salinas RN  Medication Review/Management: medications reviewed  Self-Care Promotion: BADL personal objects within reach  Intervention: Promote Injury-Free  Environment  Recent Flowsheet Documentation  Taken 11/9/2024 0400 by Celeste Salinas RN  Safety Promotion/Fall Prevention:   activity supervised   assistive device/personal items within reach   clutter free environment maintained   fall prevention program maintained   lighting adjusted   nonskid shoes/slippers when out of bed   room organization consistent   safety round/check completed  Taken 11/9/2024 0200 by Celeste Salinas RN  Safety Promotion/Fall Prevention:   activity supervised   assistive device/personal items within reach   clutter free environment maintained   fall prevention program maintained   lighting adjusted   nonskid shoes/slippers when out of bed   room organization consistent   safety round/check completed  Taken 11/9/2024 0000 by Celeste Salinas RN  Safety Promotion/Fall Prevention:   activity supervised   assistive device/personal items within reach   clutter free environment maintained   fall prevention program maintained   lighting adjusted   nonskid shoes/slippers when out of bed   room organization consistent   safety round/check completed  Taken 11/8/2024 2200 by Celeste Salinas RN  Safety Promotion/Fall Prevention:   activity supervised   assistive device/personal items within reach   clutter free environment maintained   fall prevention program maintained   lighting adjusted   nonskid shoes/slippers when out of bed   room organization consistent   safety round/check completed  Taken 11/8/2024 2000 by Celeste Salinas RN  Safety Promotion/Fall Prevention:   activity supervised   assistive device/personal items within reach   clutter free environment maintained   fall prevention program maintained   lighting adjusted   nonskid shoes/slippers when out of bed   room organization consistent   safety round/check completed     Problem: Comorbidity Management  Goal: Blood Glucose Level Within Target Range  Outcome: Progressing  Intervention: Monitor and Manage  Glycemia  Recent Flowsheet Documentation  Taken 11/9/2024 0400 by Celeste Salinas RN  Medication Review/Management: medications reviewed  Taken 11/9/2024 0200 by Celeste Salinas RN  Medication Review/Management: medications reviewed  Taken 11/9/2024 0000 by Celeste Salinas RN  Medication Review/Management: medications reviewed  Taken 11/8/2024 2200 by Celeste Salinas RN  Medication Review/Management: medications reviewed  Taken 11/8/2024 2000 by Celeste Salinas RN  Medication Review/Management: medications reviewed  Goal: Blood Pressure in Desired Range  Outcome: Progressing  Intervention: Maintain Blood Pressure Management  Recent Flowsheet Documentation  Taken 11/9/2024 0400 by Celeste Salinas RN  Medication Review/Management: medications reviewed  Taken 11/9/2024 0200 by Celeste Salinas RN  Medication Review/Management: medications reviewed  Taken 11/9/2024 0000 by Celeste Salinas RN  Medication Review/Management: medications reviewed  Taken 11/8/2024 2200 by Celeste Salinas RN  Medication Review/Management: medications reviewed  Taken 11/8/2024 2000 by Celeste Salinas RN  Medication Review/Management: medications reviewed     Problem: Skin Injury Risk Increased  Goal: Skin Health and Integrity  Outcome: Progressing  Intervention: Optimize Skin Protection  Recent Flowsheet Documentation  Taken 11/9/2024 0400 by Celeste Salinas RN  Activity Management: activity encouraged  Pressure Reduction Techniques:   frequent weight shift encouraged   pressure points protected   weight shift assistance provided  Head of Bed (HOB) Positioning: HOB elevated  Pressure Reduction Devices:   positioning supports utilized   pressure-redistributing mattress utilized   specialty bed utilized  Skin Protection:   drying agents applied   incontinence pads utilized   skin sealant/moisture barrier applied   transparent dressing maintained  Taken 11/9/2024 0200 by Rita  COREEN Alonso  Activity Management: activity minimized  Pressure Reduction Techniques:   frequent weight shift encouraged   pressure points protected   weight shift assistance provided  Head of Bed (HOB) Positioning: HOB elevated  Pressure Reduction Devices:   positioning supports utilized   pressure-redistributing mattress utilized   specialty bed utilized  Skin Protection:   drying agents applied   incontinence pads utilized   skin sealant/moisture barrier applied   transparent dressing maintained  Taken 11/9/2024 0000 by Celeste Salinas RN  Activity Management: bedrest  Pressure Reduction Techniques:   frequent weight shift encouraged   pressure points protected   weight shift assistance provided  Head of Bed (HOB) Positioning: HOB elevated  Pressure Reduction Devices:   positioning supports utilized   pressure-redistributing mattress utilized   specialty bed utilized  Skin Protection:   drying agents applied   incontinence pads utilized   skin sealant/moisture barrier applied   transparent dressing maintained  Taken 11/8/2024 2200 by Celeste Salinas RN  Activity Management: activity encouraged  Pressure Reduction Techniques:   frequent weight shift encouraged   pressure points protected   weight shift assistance provided  Head of Bed (HOB) Positioning: [facility] elevated  Pressure Reduction Devices:   positioning supports utilized   pressure-redistributing mattress utilized   specialty bed utilized  Skin Protection:   incontinence pads utilized   skin sealant/moisture barrier applied   transparent dressing maintained  Taken 11/8/2024 2000 by Celeste Salinas RN  Activity Management: activity encouraged  Pressure Reduction Techniques:   frequent weight shift encouraged   pressure points protected   weight shift assistance provided  Head of Bed (HOB) Positioning: HOB elevated  Pressure Reduction Devices:   positioning supports utilized   pressure-redistributing mattress utilized   specialty bed  utilized  Skin Protection:   incontinence pads utilized   drying agents applied   skin sealant/moisture barrier applied   transparent dressing maintained   Goal Outcome Evaluation:

## 2024-11-09 NOTE — PROGRESS NOTES
[facility] Medicine Services  PROGRESS NOTE    Patient Name: Omkar Nava  : 1955  MRN: [id number]    Date of Admission: 2024  Primary Care Physician: Alberto Dye MD    Subjective   Subjective     CC:  Weakness    HPI:  Patient seen and examined this morning.  He is more responsive this morning.  He is able to tell me his name and where he is.      Objective   Objective     Vital Signs:   Temp:  [95.5 °F (35.3 °C)-98.4 °F (36.9 °C)] 98.4 °F (36.9 °C)  Heart Rate:  [50-61] 58  Resp:  [16-18] 16  BP: (123-179)/(58-78) 150/69     Physical Exam  Constitutional:       General: He is not in acute distress.  Cardiovascular:      Rate and Rhythm: Normal rate and regular rhythm.      Heart sounds: Normal heart sounds.   Pulmonary:      Effort: Pulmonary effort is normal.      Breath sounds: Normal breath sounds.   Abdominal:      General: There is no distension.      Palpations: Abdomen is soft.      Tenderness: There is no abdominal tenderness.   Musculoskeletal:      Right lower leg: No edema.      Left lower leg: No edema.   Neurological:      Comments: Oriented to person and place, not very interactive but better than yesterday          Results Reviewed:  LAB RESULTS:      Lab 24  0710 24  0752 24  17224  1618   WBC 5.53 4.76  --  5.81   HEMOGLOBIN 8.5* 7.7*  --  7.9*   HEMATOCRIT 26.6* 24.1*  --  25.1*   PLATELETS 306 287  --  283   NEUTROS ABS 3.36 2.94  --  3.48   IMMATURE GRANS (ABS) 0.01 0.01  --  0.02   LYMPHS ABS 1.48 1.22  --  1.62   MONOS ABS 0.53 0.49  --  0.55   EOS ABS 0.12 0.08  --  0.12   MCV 93.3 94.1  --  96.2   HSTROP T  --   --  80* 84*         Lab 24  1202 24  0711 24  1829 24  1256 24  0752 24  1729 24  1618   SODIUM 132* 135* 135* 137 133*  --  133*   POTASSIUM 4.8 5.1 5.7* 6.2* 6.0*   < > 6.3*   CHLORIDE 100 101 103 104 101  --  98   CO2 25.0 24.0 23.0 24.0 25.0  --  26.0   ANION GAP  7.0 10.0 9.0 9.0 7.0  --  9.0   BUN 23 23 28* 31* 32*  --  42*   CREATININE 0.95 0.91 1.02 1.06 1.05  --  1.39*   EGFR 87.7 92.4 80.6 76.9 77.8  --  55.6*   GLUCOSE 193* 160* 134* 99 86  --  104*   CALCIUM 9.4 9.7 9.5 9.4 9.3  --  9.9   MAGNESIUM  --  1.7  --   --   --   --  2.2   PHOSPHORUS  --  3.0  --   --   --   --   --    TSH  --   --   --   --   --   --  4.100    < > = values in this interval not displayed.         Lab 11/07/24  1618   TOTAL PROTEIN 7.2   ALBUMIN 3.8   GLOBULIN 3.4   ALT (SGPT) 20   AST (SGOT) 14   BILIRUBIN <0.2   ALK PHOS 85         Lab 11/07/24  1729 11/07/24  1618   HSTROP T 80* 84*             Lab 11/07/24  2313 11/07/24  1729   IRON  --  57*  57*   IRON SATURATION (TSAT)  --  17*   TIBC  --  337   TRANSFERRIN  --  226   FERRITIN  --  443.70*   FOLATE 11.80  --    VITAMIN B 12 1,303*  --    ABO TYPING O  --    RH TYPING Positive  --    ANTIBODY SCREEN Negative  --          Brief Urine Lab Results  (Last result in the past 365 days)        Color   Clarity   Blood   Leuk Est   Nitrite   Protein   CREAT   Urine HCG        11/07/24 1806 Yellow   Clear   Negative   Negative   Negative   Negative                   Microbiology Results Abnormal       None            CT Head Without Contrast    Result Date: 11/7/2024  CT HEAD WO CONTRAST Date of Exam: 11/7/2024 4:51 PM EST Indication: ams. Comparison: Noncontrast CT of the head performed on September 11, 2024 Technique: Axial CT images were obtained of the head without contrast administration.  Automated exposure control and iterative construction methods were used. Findings: There is no evidence of hemorrhage. There is no mass effect or midline shift. Age-related involutional changes are visualized. Bilateral periventricular white matter hypodensities are visualized compatible with changes of chronic microvessel ischemia. There is no extra-axial collections. Ventricles are normal in size and configuration for patient's stated age. Posterior  fossa is within normal limits. Calvarium and skull base appear intact. Visualized sinuses show no air fluid levels. The mastoid air cells are clear. Postsurgical changes of the right orbit are visualized. Left ovary is within normal limits.     Impression: Impression: No acute intracranial process evident. Age-related involutional changes and findings compatible with changes of chronic microvascular ischemia. Electronically Signed: Quentin Osorio MD  11/7/2024 5:06 PM EST  Workstation ID: [id number]    XR Chest 1 View    Result Date: 11/7/2024  XR CHEST 1 VW Date of Exam: 11/7/2024 4:19 PM EST Indication: Weak/Dizzy/AMS triage protocol Comparison: Chest radiograph performed on October 26, 2024 Findings: No focal consolidation or effusion.  There is no evidence of pneumothorax.  The pulmonary vasculature appears within normal limits.  The cardiac and mediastinal silhouette appear unremarkable.  No acute osseous abnormality identified.     Impression: Impression: No radiographic evidence of acute chest process. Electronically Signed: Quentin Osorio MD  11/7/2024 4:33 PM EST  Workstation ID: [id number]     Results for orders placed during the hospital encounter of 01/15/24    Adult Transthoracic Echo Complete With Contrast if Necessary Per Protocol    Interpretation Summary    Left ventricular ejection fraction appears to be 56 - 60%.    Left ventricular wall thickness is consistent with hypertrophy.    Estimated right ventricular systolic pressure from tricuspid regurgitation is mildly elevated (35-45 mmHg).    There is a small (1-2cm) pericardial effusion.    No evidence for pericardial tamponade      Current medications:  Scheduled Meds:ARIPiprazole, 5 mg, Per PEG Tube, Daily  brimonidine, 1 drop, Both Eyes, TID  cholecalciferol, 400 Units, Per PEG Tube, Daily  FLUoxetine, 20 mg, Per PEG Tube, Daily  insulin regular, 2-7 Units, Subcutaneous, Q6H  lansoprazole, 30 mg, Per PEG Tube, Q AM  palliative care oral rinse, 5 mL,  Mouth/Throat, 4x Daily  QUEtiapine, 25 mg, Per PEG Tube, Nightly  senna-docusate sodium, 2 tablet, Per PEG Tube, BID  sodium chloride, 10 mL, Intravenous, Q12H  terazosin, 5 mg, Per PEG Tube, Q12H  timolol, 1 drop, Both Eyes, BID  Valproic Acid, 150 mg, Per PEG Tube, Q8H      Continuous Infusions:   PRN Meds:.  acetaminophen    senna-docusate sodium **AND** polyethylene glycol **AND** [DISCONTINUED] bisacodyl **AND** bisacodyl    dextrose    dextrose    glucagon (human recombinant)    hydrOXYzine    ipratropium-albuterol    nitroglycerin    sodium chloride    sodium chloride    sodium chloride    Assessment & Plan   Assessment & Plan     Active Hospital Problems    Diagnosis  POA    **JAYY (acute kidney injury) [N17.9]  Yes    Generalized weakness [R53.1]  Unknown    Hyperkalemia [E87.5]  Unknown    Severe vascular dementia without behavioral disturbance, psychotic disturbance, mood disturbance, or anxiety [F01.C0]  Yes    Dementia [F03.90]  Yes    Anemia of chronic disease [D63.8]  Yes    Essential hypertension [I10]  Yes    Type 2 diabetes mellitus [E11.9]  Yes      Resolved Hospital Problems   No resolved problems to display.        Brief Hospital Course to date:  Omkar Nava is a 69 y.o. male with hypertension, diabetes type 2, CKD, vascular dementia, dysphagia and chronic iron deficiency anemia presents to the ED from his nursing facility due to generalized weakness.    Generalized weakness  Concern for encephalopathy  -likely multifactorial in setting of JAYY, Hyperkalemia, anemia   -No signs of infection at this time.  Patient was hypothermic on arrival.  Complete infectious workup with blood cultures.  However no leukocytosis, chest x-ray clear, UA unremarkable  - TSH within normal limits  -CT head negative  - Checking cortisol level discussed below  -Difficult to determine patient's baseline.  Staff here who have taken care of him before reports this is a significant mental status change for the patient.   After talking daughter on the phone however she describes similar behavior for the patient  -consult PT/OT/CM in am   -fall precautions   -up with assistance     Hypothermia  - Patient hypothermic on arrival to 93.2 however this resolved overnight.  His temperature has been within normal limits until this morning, back down to 95  - Remaining vitals within normal limits  -TSH within normal limits, no signs of infection as above  - Will check an a.m. cortisol especially in the setting of hyperkalemia     Hyperkalemia  -Status post multiple doses of insulin and Lokelma  - Potassium down to 4.8 this morning  - Renal function back to baseline  -CK within normal limits  -Continue to monitor with a.m. labs    JAYY-resolved  -received 2L normal saline in the ED  -BP stable   -repeat labs in am   -hold nephrotoxic agents  -Urine studies unremarkable     Anemia of chronic disease   -H&H stable   -Anemia panel consistent with anemia of chronic disease  -type and screen   -Repeat CBC in a.m.     Chronic aspiration   -barium swallow 10/29: puree, thin liquids.  However n.p.o. due to mental status  -Continue with PEG tube feeds      Diabetes mellitus type 2  -Hgb A1c 5.6  -fingersticks q1 hour for now       HFpEF   Essential hypertension   -last echo EF 56-60%  -hold carvedilol (HR 47)  -BP reportedly low at Nursing facility, currently holding blood pressure medications  - Patient on clonidine, amlodipine, carvedilol, hydralazine  - Blood pressure creeping up, will restart home medications gradually  -hold diuretics, assess for need daily  -strict I &O's   -daily weight      Dementia with agitation   ? Seziure disorder   -on seroquel, depakote,abilify and prozac     Expected Discharge Location and Transportation: Long-term care  Expected Discharge   Expected Discharge Date: 11/11/2024; Expected Discharge Time:      VTE Prophylaxis:  Mechanical VTE prophylaxis orders are present.         AM-PAC 6 Clicks Score (PT): 6 (11/09/24  0800)    CODE STATUS:   Code Status and Medical Interventions: CPR (Attempt to Resuscitate); Full Support   Ordered at: 11/07/24 2023     Level Of Support Discussed With:    Patient     Code Status (Patient has no pulse and is not breathing):    CPR (Attempt to Resuscitate)     Medical Interventions (Patient has pulse or is breathing):    Full Support       Jeanie Calixto MD  11/09/24

## 2024-11-09 NOTE — PLAN OF CARE
Goal Outcome Evaluation:                   Anticipated Discharge Disposition (SLP): long term acute care facility          SLP Swallowing Diagnosis: oral dysphagia, suspected pharyngeal dysphagia (11/09/24 5456)

## 2024-11-10 LAB
ALBUMIN SERPL-MCNC: 3.6 G/DL (ref 3.5–5.2)
ALBUMIN/GLOB SERPL: 1.3 G/DL
ALP SERPL-CCNC: 93 U/L (ref 39–117)
ALT SERPL W P-5'-P-CCNC: 16 U/L (ref 1–41)
ANION GAP SERPL CALCULATED.3IONS-SCNC: 10 MMOL/L (ref 5–15)
AST SERPL-CCNC: 14 U/L (ref 1–40)
BILIRUB SERPL-MCNC: <0.2 MG/DL (ref 0–1.2)
BUN SERPL-MCNC: 21 MG/DL (ref 8–23)
BUN/CREAT SERPL: 23.3 (ref 7–25)
CALCIUM SPEC-SCNC: 9.1 MG/DL (ref 8.6–10.5)
CHLORIDE SERPL-SCNC: 100 MMOL/L (ref 98–107)
CO2 SERPL-SCNC: 24 MMOL/L (ref 22–29)
CORTIS SERPL-MCNC: 14.18 MCG/DL
CORTIS SERPL-MCNC: 23.01 MCG/DL
CORTIS SERPL-MCNC: 28.32 MCG/DL
CORTIS SERPL-MCNC: 5.47 MCG/DL
CREAT SERPL-MCNC: 0.9 MG/DL (ref 0.76–1.27)
DEPRECATED RDW RBC AUTO: 47.7 FL (ref 37–54)
EGFRCR SERPLBLD CKD-EPI 2021: 93.6 ML/MIN/1.73
ERYTHROCYTE [DISTWIDTH] IN BLOOD BY AUTOMATED COUNT: 14 % (ref 12.3–15.4)
GLOBULIN UR ELPH-MCNC: 2.7 GM/DL
GLUCOSE BLDC GLUCOMTR-MCNC: 121 MG/DL (ref 70–130)
GLUCOSE BLDC GLUCOMTR-MCNC: 146 MG/DL (ref 70–130)
GLUCOSE BLDC GLUCOMTR-MCNC: 165 MG/DL (ref 70–130)
GLUCOSE BLDC GLUCOMTR-MCNC: 96 MG/DL (ref 70–130)
GLUCOSE SERPL-MCNC: 119 MG/DL (ref 65–99)
HCT VFR BLD AUTO: 26.4 % (ref 37.5–51)
HGB BLD-MCNC: 8.5 G/DL (ref 13–17.7)
MAGNESIUM SERPL-MCNC: 1.6 MG/DL (ref 1.6–2.4)
MCH RBC QN AUTO: 29.8 PG (ref 26.6–33)
MCHC RBC AUTO-ENTMCNC: 32.2 G/DL (ref 31.5–35.7)
MCV RBC AUTO: 92.6 FL (ref 79–97)
PHOSPHATE SERPL-MCNC: 3.2 MG/DL (ref 2.5–4.5)
PLATELET # BLD AUTO: 308 10*3/MM3 (ref 140–450)
PMV BLD AUTO: 10.4 FL (ref 6–12)
POTASSIUM SERPL-SCNC: 5 MMOL/L (ref 3.5–5.2)
PROT SERPL-MCNC: 6.3 G/DL (ref 6–8.5)
RBC # BLD AUTO: 2.85 10*6/MM3 (ref 4.14–5.8)
SODIUM SERPL-SCNC: 134 MMOL/L (ref 136–145)
WBC NRBC COR # BLD AUTO: 6.2 10*3/MM3 (ref 3.4–10.8)

## 2024-11-10 PROCEDURE — 82948 REAGENT STRIP/BLOOD GLUCOSE: CPT

## 2024-11-10 PROCEDURE — 92610 EVALUATE SWALLOWING FUNCTION: CPT

## 2024-11-10 PROCEDURE — 25010000002 COSYNTROPIN PER 0.25 MG: Performed by: STUDENT IN AN ORGANIZED HEALTH CARE EDUCATION/TRAINING PROGRAM

## 2024-11-10 PROCEDURE — 83735 ASSAY OF MAGNESIUM: CPT | Performed by: STUDENT IN AN ORGANIZED HEALTH CARE EDUCATION/TRAINING PROGRAM

## 2024-11-10 PROCEDURE — 82024 ASSAY OF ACTH: CPT | Performed by: STUDENT IN AN ORGANIZED HEALTH CARE EDUCATION/TRAINING PROGRAM

## 2024-11-10 PROCEDURE — 63710000001 INSULIN REGULAR HUMAN PER 5 UNITS: Performed by: STUDENT IN AN ORGANIZED HEALTH CARE EDUCATION/TRAINING PROGRAM

## 2024-11-10 PROCEDURE — 85027 COMPLETE CBC AUTOMATED: CPT | Performed by: STUDENT IN AN ORGANIZED HEALTH CARE EDUCATION/TRAINING PROGRAM

## 2024-11-10 PROCEDURE — 84100 ASSAY OF PHOSPHORUS: CPT | Performed by: STUDENT IN AN ORGANIZED HEALTH CARE EDUCATION/TRAINING PROGRAM

## 2024-11-10 PROCEDURE — 82533 TOTAL CORTISOL: CPT | Performed by: STUDENT IN AN ORGANIZED HEALTH CARE EDUCATION/TRAINING PROGRAM

## 2024-11-10 PROCEDURE — 80053 COMPREHEN METABOLIC PANEL: CPT | Performed by: STUDENT IN AN ORGANIZED HEALTH CARE EDUCATION/TRAINING PROGRAM

## 2024-11-10 PROCEDURE — 99232 SBSQ HOSP IP/OBS MODERATE 35: CPT | Performed by: STUDENT IN AN ORGANIZED HEALTH CARE EDUCATION/TRAINING PROGRAM

## 2024-11-10 RX ORDER — COSYNTROPIN 0.25 MG/ML
0.25 INJECTION, POWDER, FOR SOLUTION INTRAMUSCULAR; INTRAVENOUS ONCE
Status: COMPLETED | OUTPATIENT
Start: 2024-11-10 | End: 2024-11-10

## 2024-11-10 RX ADMIN — BRIMONIDINE TARTRATE 1 DROP: 2 SOLUTION/ DROPS OPHTHALMIC at 20:31

## 2024-11-10 RX ADMIN — QUETIAPINE FUMARATE 25 MG: 25 TABLET ORAL at 20:31

## 2024-11-10 RX ADMIN — SENNOSIDES AND DOCUSATE SODIUM 2 TABLET: 50; 8.6 TABLET ORAL at 08:07

## 2024-11-10 RX ADMIN — TERAZOSIN HYDROCHLORIDE 5 MG: 5 CAPSULE ORAL at 08:07

## 2024-11-10 RX ADMIN — HYDROXYZINE HYDROCHLORIDE 25 MG: 25 TABLET ORAL at 20:31

## 2024-11-10 RX ADMIN — BRIMONIDINE TARTRATE 1 DROP: 2 SOLUTION/ DROPS OPHTHALMIC at 08:07

## 2024-11-10 RX ADMIN — Medication 10 ML: at 20:31

## 2024-11-10 RX ADMIN — Medication 400 UNITS: at 08:08

## 2024-11-10 RX ADMIN — Medication 10 ML: at 08:08

## 2024-11-10 RX ADMIN — INSULIN HUMAN 2 UNITS: 100 INJECTION, SOLUTION PARENTERAL at 13:50

## 2024-11-10 RX ADMIN — VALPROIC ACID 150 MG: 250 SOLUTION ORAL at 14:21

## 2024-11-10 RX ADMIN — VALPROIC ACID 150 MG: 250 SOLUTION ORAL at 20:30

## 2024-11-10 RX ADMIN — COSYNTROPIN 0.25 MG: 0.25 INJECTION, POWDER, LYOPHILIZED, FOR SOLUTION INTRAMUSCULAR; INTRAVENOUS at 16:38

## 2024-11-10 RX ADMIN — TERAZOSIN HYDROCHLORIDE 5 MG: 5 CAPSULE ORAL at 20:30

## 2024-11-10 RX ADMIN — FLUOXETINE HYDROCHLORIDE 20 MG: 20 SOLUTION ORAL at 08:07

## 2024-11-10 RX ADMIN — TIMOLOL MALEATE 1 DROP: 5 SOLUTION/ DROPS OPHTHALMIC at 08:12

## 2024-11-10 RX ADMIN — ARIPIPRAZOLE 5 MG: 5 TABLET ORAL at 08:07

## 2024-11-10 RX ADMIN — AMLODIPINE BESYLATE 10 MG: 10 TABLET ORAL at 08:07

## 2024-11-10 RX ADMIN — LANSOPRAZOLE 30 MG: 15 TABLET, ORALLY DISINTEGRATING, DELAYED RELEASE ORAL at 05:18

## 2024-11-10 RX ADMIN — TIMOLOL MALEATE 1 DROP: 5 SOLUTION/ DROPS OPHTHALMIC at 20:31

## 2024-11-10 RX ADMIN — VALPROIC ACID 150 MG: 250 SOLUTION ORAL at 05:18

## 2024-11-10 NOTE — PLAN OF CARE
Problem: Adult Inpatient Plan of Care  Goal: Plan of Care Review  11/10/2024 0448 by Celeste Salinas RN  Outcome: Progressing  11/10/2024 0354 by Celeste Salinas RN  Outcome: Progressing  Goal: Patient-Specific Goal (Individualized)  11/10/2024 0448 by Celeste Salinas RN  Outcome: Progressing  11/10/2024 0354 by Celeste Salinas RN  Outcome: Progressing  Goal: Absence of Hospital-Acquired Illness or Injury  11/10/2024 0448 by Celeste Salinas RN  Outcome: Progressing  11/10/2024 0354 by Celeste Salinas RN  Outcome: Progressing  Intervention: Identify and Manage Fall Risk  Recent Flowsheet Documentation  Taken 11/10/2024 0400 by Celeste Salinas RN  Safety Promotion/Fall Prevention:   activity supervised   assistive device/personal items within reach   clutter free environment maintained   fall prevention program maintained   lighting adjusted   nonskid shoes/slippers when out of bed   room organization consistent   safety round/check completed  Taken 11/10/2024 0200 by Celeste Salinas RN  Safety Promotion/Fall Prevention:   activity supervised   assistive device/personal items within reach   clutter free environment maintained   fall prevention program maintained   lighting adjusted   nonskid shoes/slippers when out of bed   room organization consistent   safety round/check completed  Taken 11/10/2024 0000 by Celeste Salinas RN  Safety Promotion/Fall Prevention:   activity supervised   assistive device/personal items within reach   clutter free environment maintained   fall prevention program maintained   lighting adjusted   nonskid shoes/slippers when out of bed   room organization consistent   safety round/check completed  Taken 11/9/2024 2200 by Celeste Salinas RN  Safety Promotion/Fall Prevention:   activity supervised   assistive device/personal items within reach   clutter free environment maintained   fall prevention program maintained   lighting  adjusted   nonskid shoes/slippers when out of bed   room organization consistent   safety round/check completed  Taken 11/9/2024 2000 by Celeste Salinas RN  Safety Promotion/Fall Prevention:   activity supervised   assistive device/personal items within reach   clutter free environment maintained   fall prevention program maintained   lighting adjusted   nonskid shoes/slippers when out of bed   room organization consistent   safety round/check completed  Intervention: Prevent Skin Injury  Recent Flowsheet Documentation  Taken 11/10/2024 0400 by Celeste Salinas RN  Body Position:   turned   right  Skin Protection:   drying agents applied   incontinence pads utilized   skin sealant/moisture barrier applied   transparent dressing maintained  Taken 11/10/2024 0200 by Celeste Salinas RN  Body Position:   supine   legs elevated  Skin Protection:   drying agents applied   incontinence pads utilized   skin sealant/moisture barrier applied   transparent dressing maintained  Taken 11/10/2024 0000 by Celeste Salinas RN  Body Position: left  Skin Protection:   drying agents applied   incontinence pads utilized   skin sealant/moisture barrier applied   transparent dressing maintained  Taken 11/9/2024 2200 by Celeste Salinas RN  Body Position:   supine   legs elevated  Skin Protection:   drying agents applied   incontinence pads utilized   skin sealant/moisture barrier applied   transparent dressing maintained  Taken 11/9/2024 2000 by Celeste Salinas RN  Skin Protection:   drying agents applied   incontinence pads utilized   skin sealant/moisture barrier applied   transparent dressing maintained   silicone foam dressing in place  Intervention: Prevent and Manage VTE (Venous Thromboembolism) Risk  Recent Flowsheet Documentation  Taken 11/9/2024 2000 by Celeste Salinas RN  VTE Prevention/Management:   bilateral   SCDs (sequential compression devices) on  Intervention: Prevent  Infection  Recent Flowsheet Documentation  Taken 11/10/2024 0400 by Celeste Salinas RN  Infection Prevention:   environmental surveillance performed   hand hygiene promoted   equipment surfaces disinfected   rest/sleep promoted  Taken 11/10/2024 0200 by Celeste Salinas RN  Infection Prevention:   environmental surveillance performed   equipment surfaces disinfected   hand hygiene promoted   rest/sleep promoted  Taken 11/10/2024 0000 by Celeste Salinas RN  Infection Prevention:   environmental surveillance performed   equipment surfaces disinfected   hand hygiene promoted   single patient room provided  Taken 11/9/2024 2200 by Celeste Salinas RN  Infection Prevention:   environmental surveillance performed   equipment surfaces disinfected   hand hygiene promoted   single patient room provided  Taken 11/9/2024 2000 by Celeste Salinas RN  Infection Prevention:   environmental surveillance performed   equipment surfaces disinfected   hand hygiene promoted   single patient room provided  Goal: Optimal Comfort and Wellbeing  11/10/2024 0448 by Celeste Salinas RN  Outcome: Progressing  11/10/2024 0354 by Celeste Salinas RN  Outcome: Progressing  Intervention: Provide Person-Centered Care  Recent Flowsheet Documentation  Taken 11/9/2024 2000 by Celeste Salnias RN  Trust Relationship/Rapport: care explained  Goal: Readiness for Transition of Care  11/10/2024 0448 by Celeste Salinas RN  Outcome: Progressing  11/10/2024 0354 by Celeste Salinas RN  Outcome: Progressing     Problem: Fall Injury Risk  Goal: Absence of Fall and Fall-Related Injury  11/10/2024 0448 by Celeste Salinas RN  Outcome: Progressing  11/10/2024 0354 by Celeste Salinas RN  Outcome: Progressing  Intervention: Identify and Manage Contributors  Recent Flowsheet Documentation  Taken 11/10/2024 0400 by Celeste Salinas RN  Medication Review/Management: medications reviewed  Self-Care  Promotion: BADL personal objects within reach  Taken 11/10/2024 0200 by Celeste Salinas RN  Medication Review/Management: medications reviewed  Self-Care Promotion: BADL personal objects within reach  Taken 11/10/2024 0000 by Celeste Salinas RN  Medication Review/Management: medications reviewed  Self-Care Promotion: BADL personal objects within reach  Taken 11/9/2024 2200 by Celeste Salinas RN  Medication Review/Management: medications reviewed  Self-Care Promotion: BADL personal objects within reach  Taken 11/9/2024 2000 by Celeste Salinas RN  Medication Review/Management: medications reviewed  Self-Care Promotion: BADL personal objects within reach  Intervention: Promote Injury-Free Environment  Recent Flowsheet Documentation  Taken 11/10/2024 0400 by Celeste Salinas RN  Safety Promotion/Fall Prevention:   activity supervised   assistive device/personal items within reach   clutter free environment maintained   fall prevention program maintained   lighting adjusted   nonskid shoes/slippers when out of bed   room organization consistent   safety round/check completed  Taken 11/10/2024 0200 by Celeste Salinas RN  Safety Promotion/Fall Prevention:   activity supervised   assistive device/personal items within reach   clutter free environment maintained   fall prevention program maintained   lighting adjusted   nonskid shoes/slippers when out of bed   room organization consistent   safety round/check completed  Taken 11/10/2024 0000 by Celeste Salinas RN  Safety Promotion/Fall Prevention:   activity supervised   assistive device/personal items within reach   clutter free environment maintained   fall prevention program maintained   lighting adjusted   nonskid shoes/slippers when out of bed   room organization consistent   safety round/check completed  Taken 11/9/2024 2200 by Celeste Salinas RN  Safety Promotion/Fall Prevention:   activity supervised   assistive  device/personal items within reach   clutter free environment maintained   fall prevention program maintained   lighting adjusted   nonskid shoes/slippers when out of bed   room organization consistent   safety round/check completed  Taken 11/9/2024 2000 by Celeste Salinas RN  Safety Promotion/Fall Prevention:   activity supervised   assistive device/personal items within reach   clutter free environment maintained   fall prevention program maintained   lighting adjusted   nonskid shoes/slippers when out of bed   room organization consistent   safety round/check completed     Problem: Comorbidity Management  Goal: Blood Glucose Level Within Target Range  11/10/2024 0448 by Celeste Salinas RN  Outcome: Progressing  11/10/2024 0354 by Celeste Salinas RN  Outcome: Progressing  Intervention: Monitor and Manage Glycemia  Recent Flowsheet Documentation  Taken 11/10/2024 0400 by Celeste Salinas RN  Medication Review/Management: medications reviewed  Taken 11/10/2024 0200 by Celeste Salinas RN  Medication Review/Management: medications reviewed  Taken 11/10/2024 0000 by Celeste Salinas RN  Medication Review/Management: medications reviewed  Taken 11/9/2024 2200 by Celeste Salinas RN  Medication Review/Management: medications reviewed  Taken 11/9/2024 2000 by Celeste Salinas RN  Medication Review/Management: medications reviewed  Goal: Blood Pressure in Desired Range  11/10/2024 0448 by Celeste Salinas RN  Outcome: Progressing  11/10/2024 0354 by Celeste Salinas RN  Outcome: Progressing  Intervention: Maintain Blood Pressure Management  Recent Flowsheet Documentation  Taken 11/10/2024 0400 by Celeste Salinas RN  Medication Review/Management: medications reviewed  Taken 11/10/2024 0200 by Celeste Salinas RN  Medication Review/Management: medications reviewed  Taken 11/10/2024 0000 by Celeste Salinas RN  Medication Review/Management: medications  reviewed  Taken 11/9/2024 2200 by Celeste Salinas RN  Medication Review/Management: medications reviewed  Taken 11/9/2024 2000 by Celeste Salinas RN  Medication Review/Management: medications reviewed     Problem: Skin Injury Risk Increased  Goal: Skin Health and Integrity  11/10/2024 0448 by Celeste Salinas RN  Outcome: Progressing  11/10/2024 0354 by Celeste Salinas RN  Outcome: Progressing  Intervention: Optimize Skin Protection  Recent Flowsheet Documentation  Taken 11/10/2024 0400 by Celeste Salinas RN  Activity Management: activity encouraged  Pressure Reduction Techniques:   frequent weight shift encouraged   pressure points protected   weight shift assistance provided  Head of Bed (HOB) Positioning: [facility] elevated  Pressure Reduction Devices:   positioning supports utilized   pressure-redistributing mattress utilized   specialty bed utilized  Skin Protection:   drying agents applied   incontinence pads utilized   skin sealant/moisture barrier applied   transparent dressing maintained  Taken 11/10/2024 0200 by Celeste Salinas RN  Activity Management: bedrest  Pressure Reduction Techniques:   frequent weight shift encouraged   pressure points protected   weight shift assistance provided  Head of Bed (HOB) Positioning: [facility] elevated  Pressure Reduction Devices:   positioning supports utilized   pressure-redistributing mattress utilized   specialty bed utilized  Skin Protection:   drying agents applied   incontinence pads utilized   skin sealant/moisture barrier applied   transparent dressing maintained  Taken 11/10/2024 0000 by Celeste Salinas RN  Activity Management: activity encouraged  Pressure Reduction Techniques:   frequent weight shift encouraged   pressure points protected   weight shift assistance provided  Head of Bed (HOB) Positioning: HOB elevated  Pressure Reduction Devices:   positioning supports utilized   pressure-redistributing mattress utilized    specialty bed utilized  Skin Protection:   drying agents applied   incontinence pads utilized   skin sealant/moisture barrier applied   transparent dressing maintained  Taken 11/9/2024 2200 by Celeste Salinas RN  Activity Management: activity encouraged  Pressure Reduction Techniques:   frequent weight shift encouraged   pressure points protected   weight shift assistance provided  Head of Bed (HOB) Positioning: HOB elevated  Pressure Reduction Devices:   positioning supports utilized   pressure-redistributing mattress utilized   specialty bed utilized  Skin Protection:   drying agents applied   incontinence pads utilized   skin sealant/moisture barrier applied   transparent dressing maintained  Taken 11/9/2024 2000 by Celeste Salinas RN  Activity Management: activity encouraged  Pressure Reduction Techniques:   frequent weight shift encouraged   pressure points protected   weight shift assistance provided  Pressure Reduction Devices:   positioning supports utilized   pressure-redistributing mattress utilized   specialty bed utilized  Skin Protection:   drying agents applied   incontinence pads utilized   skin sealant/moisture barrier applied   transparent dressing maintained   silicone foam dressing in place   Goal Outcome Evaluation:

## 2024-11-10 NOTE — THERAPY RE-EVALUATION
Acute Care - Speech Language Pathology   Swallow Re-Evaluation [facility]  Clinical Swallow Evaluation       Patient Name: Omkar Nava  : 1955  MRN: [id number]  Today's Date: 11/10/2024               Admit Date: 2024    Visit Dx:     ICD-10-CM ICD-9-CM   1. Acute kidney injury  N17.9 584.9   2. Hyperkalemia  E87.5 276.7   3. Generalized weakness  R53.1 780.79   4. History of diabetes mellitus  Z86.39 V12.29   5. Vascular dementia, unspecified dementia severity, unspecified whether behavioral, psychotic, or mood disturbance or anxiety  F01.50 290.40   6. 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, without long-term current use of insulin    Acute osteomyelitis of left foot    Type 2 diabetes mellitus    Essential hypertension    Psychotic disorder    Neurocognitive disorder    S/P transmetatarsal amputation of foot, left    Abscess of left foot    Anemia of chronic disease    Aspiration pneumonia    Cervical spine pain    Chronic kidney disease    Closed head injury without loss of consciousness    Dementia    Dental cavities    Diarrhea of presumed infectious origin    Displaced fracture of proximal phalanx of left great toe, initial encounter for open fracture    Dysphagia    Erectile dysfunction    Generalized muscle weakness    Hallucinations    Hypertensive heart and chronic kidney disease 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 failure, with stage 1 through stage 4 chronic kidney disease, or unspecified chronic kidney disease    Insomnia due to medical condition    Iron deficiency anemia    Laceration without foreign body, left foot, subsequent encounter    Personal history of Methicillin resistant Staphylococcus aureus infection    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    Protein calorie malnutrition    Simple chronic bronchitis    Tobacco use    Type 2  diabetes mellitus with diabetic neuropathy, unspecified    Type 2 diabetes mellitus with diabetic chronic kidney disease    Acute type 1 respiratory failure    Severe vascular dementia without behavioral disturbance, psychotic disturbance, mood disturbance, or anxiety    JAYY (acute kidney injury)    Generalized weakness    Hyperkalemia     Past Medical History:   Diagnosis Date    Anemia     Dementia     Diabetes mellitus     Dysphagia     GERD (gastroesophageal reflux disease)     History of alcohol abuse     History of cocaine use     History of marijuana use     Hypertension     Osteomyelitis     Poor historian     records obtained from nursing home records & his family    Visual impairment      Past Surgical History:   Procedure Laterality Date    AMPUTATION FOOT Left 10/18/2022    Procedure: PARTIAL FIRST RAY AMPUTATION LEFT;  Surgeon: Yeison Petty MD;  Location:  [facility];  Service: Orthopedics;  Laterality: Left;    AMPUTATION FOOT Left 12/5/2022    Procedure: Transmetatarsal of Left Foot;  Surgeon: Yeison Petty MD;  Location:  [facility];  Service: Orthopedics;  Laterality: Left;    ENDOSCOPY N/A 3/14/2024    Procedure: ESOPHAGOGASTRODUODENOSCOPY WITH JEJUNAL TUBE INSERTION AT BEDSIDE;  Surgeon: Brunner, Mark I, MD;  Location:  [facility];  Service: Gastroenterology;  Laterality: N/A;    ENDOSCOPY W/ PEG TUBE PLACEMENT N/A 4/1/2024    Procedure: ESOPHAGOGASTRODUODENOSCOPY WITH PERCUTANEOUS ENDOSCOPIC GASTROSTOMY TUBE INSERTION;  Surgeon: Brunner, Mark I, MD;  Location:  [facility];  Service: Gastroenterology;  Laterality: N/A;  EGD with PEG placement.  Secured at 3.5 cm    EYE SURGERY         SLP Recommendation and Plan  SLP Swallowing Diagnosis: oral dysphagia, suspected pharyngeal dysphagia (11/10/24 1030)  SLP Diet Recommendation: NPO, long term alternate methods of nutrition/hydration (11/10/24 1030)     SLP Rec. for Method of Medication Administration: meds via alternate route (11/10/24  1030)     Monitor for Signs of Aspiration: yes, notify SLP if any concerns (11/10/24 1030)  Recommended Diagnostics: FEES (today or tomorrow) (11/10/24 1030)  Swallow Criteria for Skilled Therapeutic Interventions Met: demonstrates skilled criteria (11/10/24 1030)  Anticipated Discharge Disposition (SLP): long term acute care facility (11/10/24 1030)  Rehab Potential/Prognosis, Swallowing: re-evaluate goals as necessary (11/10/24 1030)  Therapy Frequency (Swallow): PRN, 5 days per week (11/10/24 1030)  Predicted Duration Therapy Intervention (Days): 1 week (11/10/24 1030)  Oral Care Recommendations: Oral Care BID/PRN, Suction toothbrush (11/10/24 1030)                                               SWALLOW EVALUATION (Last 72 Hours)       SLP Adult Swallow Evaluation       Row Name 11/10/24 1030 11/09/24 1550 11/08/24 0900             Rehab Evaluation    Document Type re-evaluation  -DV re-evaluation  - evaluation  -RS      Subjective Information no complaints  -DV no complaints  - no complaints  -RS      Patient Observations alert;cooperative  -DV cooperative;lethargic  - decreased LOC  -RS      Patient/Family/Caregiver Comments/Observations no family present  -DV No family present  - no family present  -RS      Patient Effort good  -DV adequate  -MH poor  -RS      Comment more participatory today  -DV -- pt awake but minimally participatory. Pt appears changed from previous admission. RN alerted, notified, and at bedside. She states that MD is also aware  -RS      Symptoms Noted During/After Treatment none  -DV none  - none  -RS      Oral Care -- -- oral rinse provided  -RS         General Information    Patient Profile Reviewed yes  -DV yes  - yes  -RS      Pertinent History Of Current Problem see initial eval  -DV See initial eval  -MH Pt is a 69 yoM w PMHx HTN, DM2, CKD, vascular dementia, dysphagia and chronic iron deficiency anemia presents to the ED accompanied by daughter due to generalized  weakness.  -RS      Current Method of Nutrition NPO;gastrostomy feedings  - NPO;gastrostomy feedings  - NPO;gastrostomy feedings  -RS      Precautions/Limitations, Vision vision impairment, bilaterally  - vision impairment, bilaterally  - vision impairment, bilaterally  -RS      Precautions/Limitations, Hearing WFL;for purposes of eval  - WFL;for purposes of eval  - WFL;for purposes of eval  -RS      Prior Level of Function-Communication cognitive-linguistic impairment  - cognitive-linguistic impairment  - cognitive-linguistic impairment  -RS      Prior Level of Function-Swallowing thin liquids;puree;alternative feeding method;other (see comments)  [place] [facility] [place]  - thin liquids;puree;alternative feeding method;other (see comments)  [facility] [place]: mod-severe oral impairment, mild pharyngeal w recs  - thin liquids;puree;alternative feeding method;other (see comments)  [facility] [place]: mod-severe oral impairment, mild pharyngeal w recs  -RS      Plans/Goals Discussed with patient;agreed upon  - patient  - patient  -RS      Barriers to Rehab previous functional deficit;cognitive status  - previous functional deficit;cognitive status  - previous functional deficit  -RS      Patient's Goals for Discharge patient did not state  - patient did not state  - patient did not state  -         Pain    Pretreatment Pain Rating 0/10 - no pain  - -- --      Posttreatment Pain Rating 0/10 - no pain  - -- --      Additional Documentation -- Pain Scale: FACES Pre/Post-Treatment (Group)  - Pain Scale: FACES Pre/Post-Treatment (Group)  -         Pain Scale: FACES Pre/Post-Treatment    Pain: FACES Scale, Pretreatment -- 0-->no hurt  - 0-->no hurt  -RS      Posttreatment Pain Rating -- 0-->no hurt  - 0-->no hurt  -RS         Oral Motor Structure and Function    Oral Lesions or Structural Abnormalities and/or variants -- Open mouth posture  - open mouth posture  -      Dentition Assessment  missing teeth;teeth are in poor condition  -DV missing teeth;teeth are in poor condition  - missing teeth;teeth are in poor condition  -RS      Secretion Management WNL/WFL  -DV WNL/WFL  -MH WNL/WFL  -RS      Mucosal Quality dry  -DV -- dry  -RS      Volitional Swallow unable to elicit  -DV -- unable to elicit  -RS      Volitional Cough unable to elicit  -DV -- unable to elicit  -RS         Oral Musculature and Cranial Nerve Assessment    Oral Motor General Assessment unable to assess  -DV other (see comments)  Pt u/a to follow OM commands  - --      Oral Motor, Comment pt u/a to follow directions  -DV -- pt u/a to follow directions to assess in isolation  -RS         General Eating/Swallowing Observations    Respiratory Support Currently in Use room air  -DV room air  - room air  -RS      Eating/Swallowing Skills fed by SLP;unable to perform self-feeding  - fed by SLP;unable to perform self-feeding  - fed by SLP  -RS      Positioning During Eating upright 90 degree;upright in bed  - upright 90 degree;upright in bed  - upright in bed  -      Utensils Used spoon;straw  - spoon;cup  - other (see comments)  ice chip presented by hand  -RS      Consistencies Trialed ice chips;thin liquids;nectar/syrup-thick liquids;pureed  - ice chips;thin liquids  - ice chips  -RS         Respiratory    Respiratory Status WFL  -DV -- WFL  -RS         Clinical Swallow Eval    Oral Prep Phase impaired  -DV impaired  - impaired  -RS      Oral Transit impaired  -DV impaired  - impaired  -RS      Oral Residue WFL  -DV -- WFL  -RS      Pharyngeal Phase suspected pharyngeal impairment  - suspected pharyngeal impairment  - suspected pharyngeal impairment  -RS      Esophageal Phase -- -- --  adequate PO not administered to determine  -RS      Clinical Swallow Evaluation Summary Pt much more alert and participatory today. Accepted all PO trials. Improved oral phase, continues with oral-holding only. (Did not test  regular solids.) Pt exhibited cough with thin liquid. No s/sx aspriation with nectar-thick liquid or puree. Pt appears appropriate for instrumental assessment now, recommend FEES today or tomorrow pending availability.  -DV Pt lethargic, cues required t/o eval for pt to remain awake. Anterior loss, oral holding & delayed initiation of bolus transit noted. Delayed cough w/ thin liquid trials. Pt declined NTL or pureed trials. Safest recommendation cont NPO, SLP will f/u for re-eval & monitor for readiness to participate in instrumental assessment `  -MH --         Oral Prep Concerns    Oral Prep Concerns oral holding  -DV reduced lip opening;incomplete or weak lip closure around spoon;anterior loss;oral holding  -MH oral holding;inefficient mastication;reduced lip opening;incomplete or weak lip closure around spoon  -RS      Oral Holding all consistencies  -DV thin  -MH other (see comments)  ice  -RS      Inefficient Mastication -- -- other (see comments)  ice  -RS      Reduced Lip Opening -- thin  -MH other (see comments)  ice  -RS      Incomplete or Weak Lip Closure Around Spoon -- thin  -MH other (see comments)  ice  -RS      Anterior Loss -- thin  -MH --         Oral Transit Concerns    Oral Transit Concerns delayed initiation of bolus transit  -DV delayed initiation of bolus transit  -MH unable to initiate oral transit  -RS      Delayed Intiation of Bolus Transit all consistencies  -DV thin  -MH --         Pharyngeal Phase Concerns    Pharyngeal Phase Concerns cough  -DV cough  -MH other (see comments)  Extremely delayed swallow  -RS      Cough thin  -DV thin  -MH --      Pharyngeal Phase Concerns, Comment -- -- Pt is u/a to swallow on command. He is extremely lethargic so suspect that level of alertness is larger barrier to safe PO intake than a pharyngeal component. Continue PEG for nutritional support, SLP will re-assess when alertness improves  -RS         SLP Evaluation Clinical Impression    SLP Swallowing  Diagnosis oral dysphagia;suspected pharyngeal dysphagia  - oral dysphagia;suspected pharyngeal dysphagia  - suspect chronic;oral dysphagia;R/O pharyngeal dysphagia;unable to assess  -RS      Functional Impact risk of aspiration/pneumonia;risk of malnutrition  -DV risk of aspiration/pneumonia;risk of malnutrition  - risk of aspiration/pneumonia;risk of malnutrition  -RS      Rehab Potential/Prognosis, Swallowing re-evaluate goals as necessary  -DV re-evaluate goals as necessary  - re-evaluate goals as necessary  -RS      Swallow Criteria for Skilled Therapeutic Interventions Met demonstrates skilled criteria  - demonstrates skilled criteria  - demonstrates skilled criteria  -RS         Recommendations    Therapy Frequency (Swallow) PRN;5 days per week  -DV -- PRN;5 days per week  -RS      Predicted Duration Therapy Intervention (Days) 1 week  -DV 1 week  - 1 week  -RS      SLP Diet Recommendation NPO;long term alternate methods of nutrition/hydration  - NPO;long term alternate methods of nutrition/hydration  - NPO;long term alternate methods of nutrition/hydration  -RS      Recommended Diagnostics FEES  today or tomorrow  - reassess via clinical swallow evaluation  - reassess via clinical swallow evaluation;other (see comments)  when alertness has improved  -RS      Oral Care Recommendations Oral Care BID/PRN;Suction toothbrush  -DV Oral Care BID/PRN;Suction toothbrush  - Oral Care BID/PRN;Suction toothbrush  -RS      SLP Rec. for Method of Medication Administration meds via alternate route  - meds via alternate route  - meds via alternate route  -RS      Monitor for Signs of Aspiration yes;notify SLP if any concerns  -DV yes;notify SLP if any concerns  - yes;notify SLP if any concerns  -RS      Anticipated Discharge Disposition (SLP) long term acute care facility  - long term acute care facility  -[facility] term acute care facility  -RS                User Key  (r) = Recorded By, (t) =  Taken By, (c) = Cosigned By      Initials Name Effective Dates    DV Marilee Jay, MS CCC-SLP 06/16/21 -     Kathy Grossman MS CCC-SLP 05/12/23 -     Sawyer High MS CCC-SLP 09/14/23 -                     EDUCATION  The patient has been educated in the following areas:   Dysphagia (Swallowing Impairment) NPO rationale.                Time Calculation:    Time Calculation- SLP       Row Name 11/10/24 1103             Time Calculation- SLP    SLP Start Time 1030  -DV      SLP Received On 11/10/24  -DV         Untimed Charges    SLP Eval/Re-eval  ST Eval Oral Pharyng Swallow - 53730  -DV      42778-AO Eval Oral Pharyng Swallow Minutes 40  -DV         Total Minutes    Untimed Charges Total Minutes 40  -DV       Total Minutes 40  -DV                User Key  (r) = Recorded By, (t) = Taken By, (c) = Cosigned By      Initials Name Provider Type    DV Marilee Jay, MS CCC-SLP Speech and Language Pathologist                    Therapy Charges for Today       Code Description Service Date Service Provider Modifiers Qty    [id number] HC ST EVAL ORAL PHARYNG SWALLOW 3 11/10/2024 Marilee Jay MS CCC-SLP GN, KX 1                 Marilee Jay MS CCC-SLP  11/10/2024

## 2024-11-10 NOTE — PROGRESS NOTES
[facility] Medicine Services  PROGRESS NOTE    Patient Name: Omkar Nava  : 1955  MRN: [id number]    Date of Admission: 2024  Primary Care Physician: Alberto Dye MD    Subjective   Subjective     CC:  Weakness    HPI:  Patient seen and examined this morning.  He is more interactive and talkative this morning.  He reports he is at the hospital, knows it is November.  He denies any pain and reports he is just sleepy.    Objective   Objective     Vital Signs:   Temp:  [97.5 °F (36.4 °C)-98.5 °F (36.9 °C)] 98.2 °F (36.8 °C)  Heart Rate:  [55-61] 57  Resp:  [16-18] 16  BP: (131-171)/(54-81) 131/54     Physical Exam  Constitutional:       General: He is not in acute distress.  Cardiovascular:      Rate and Rhythm: Normal rate and regular rhythm.      Heart sounds: Normal heart sounds.   Pulmonary:      Effort: Pulmonary effort is normal.      Breath sounds: Normal breath sounds.   Abdominal:      General: There is no distension.      Palpations: Abdomen is soft.      Tenderness: There is no abdominal tenderness.   Musculoskeletal:      Right lower leg: No edema.      Left lower leg: No edema.   Neurological:      Comments: Oriented to person and place, more interactive than yesterday          Results Reviewed:  LAB RESULTS:      Lab 11/10/24  0632 24  0710 24  0752 24  1729 24  1618   WBC 6.20 5.53 4.76  --  5.81   HEMOGLOBIN 8.5* 8.5* 7.7*  --  7.9*   HEMATOCRIT 26.4* 26.6* 24.1*  --  25.1*   PLATELETS 308 306 287  --  283   NEUTROS ABS  --  3.36 2.94  --  3.48   IMMATURE GRANS (ABS)  --  0.01 0.01  --  0.02   LYMPHS ABS  --  1.48 1.22  --  1.62   MONOS ABS  --  0.53 0.49  --  0.55   EOS ABS  --  0.12 0.08  --  0.12   MCV 92.6 93.3 94.1  --  96.2   HSTROP T  --   --   --  80* 84*         Lab 11/10/24  0632 24  1202 24  0711 24  1829 24  1256 24  1729 24  1618   SODIUM 134* 132* 135* 135* 137   < > 133*   POTASSIUM  5.0 4.8 5.1 5.7* 6.2*   < > 6.3*   CHLORIDE 100 100 101 103 104   < > 98   CO2 24.0 25.0 24.0 23.0 24.0   < > 26.0   ANION GAP 10.0 7.0 10.0 9.0 9.0   < > 9.0   BUN 21 23 23 28* 31*   < > 42*   CREATININE 0.90 0.95 0.91 1.02 1.06   < > 1.39*   EGFR 93.6 87.7 92.4 80.6 76.9   < > 55.6*   GLUCOSE 119* 193* 160* 134* 99   < > 104*   CALCIUM 9.1 9.4 9.7 9.5 9.4   < > 9.9   MAGNESIUM 1.6  --  1.7  --   --   --  2.2   PHOSPHORUS 3.2  --  3.0  --   --   --   --    TSH  --   --   --   --   --   --  4.100    < > = values in this interval not displayed.         Lab 11/10/24  0632 11/07/24  1618   TOTAL PROTEIN 6.3 7.2   ALBUMIN 3.6 3.8   GLOBULIN 2.7 3.4   ALT (SGPT) 16 20   AST (SGOT) 14 14   BILIRUBIN <0.2 <0.2   ALK PHOS 93 85         Lab 11/07/24  1729 11/07/24  1618   HSTROP T 80* 84*             Lab 11/07/24  2313 11/07/24  1729   IRON  --  57*  57*   IRON SATURATION (TSAT)  --  17*   TIBC  --  337   TRANSFERRIN  --  226   FERRITIN  --  443.70*   FOLATE 11.80  --    VITAMIN B 12 1,303*  --    ABO TYPING O  --    RH TYPING Positive  --    ANTIBODY SCREEN Negative  --          Brief Urine Lab Results  (Last result in the past 365 days)        Color   Clarity   Blood   Leuk Est   Nitrite   Protein   CREAT   Urine HCG        11/07/24 1806 Yellow   Clear   Negative   Negative   Negative   Negative                   Microbiology Results Abnormal       None            No radiology results from the last 24 hrs    Results for orders placed during the hospital encounter of 01/15/24    Adult Transthoracic Echo Complete With Contrast if Necessary Per Protocol    Interpretation Summary    Left ventricular ejection fraction appears to be 56 - 60%.    Left ventricular wall thickness is consistent with hypertrophy.    Estimated right ventricular systolic pressure from tricuspid regurgitation is mildly elevated (35-45 mmHg).    There is a small (1-2cm) pericardial effusion.    No evidence for pericardial tamponade      Current  medications:  Scheduled Meds:amLODIPine, 10 mg, Per G Tube, Q24H  ARIPiprazole, 5 mg, Per PEG Tube, Daily  brimonidine, 1 drop, Both Eyes, TID  cholecalciferol, 400 Units, Per PEG Tube, Daily  cosyntropin, 0.25 mg, Intravenous, Once  FLUoxetine, 20 mg, Per PEG Tube, Daily  insulin regular, 2-7 Units, Subcutaneous, Q6H  lansoprazole, 30 mg, Per PEG Tube, Q AM  palliative care oral rinse, 5 mL, Mouth/Throat, 4x Daily  QUEtiapine, 25 mg, Per PEG Tube, Nightly  senna-docusate sodium, 2 tablet, Per PEG Tube, BID  sodium chloride, 10 mL, Intravenous, Q12H  terazosin, 5 mg, Per PEG Tube, Q12H  timolol, 1 drop, Both Eyes, BID  Valproic Acid, 150 mg, Per PEG Tube, Q8H      Continuous Infusions:   PRN Meds:.  acetaminophen    senna-docusate sodium **AND** polyethylene glycol **AND** [DISCONTINUED] bisacodyl **AND** bisacodyl    dextrose    dextrose    glucagon (human recombinant)    hydrOXYzine    ipratropium-albuterol    nitroglycerin    sodium chloride    sodium chloride    sodium chloride    Assessment & Plan   Assessment & Plan     Active Hospital Problems    Diagnosis  POA    **JAYY (acute kidney injury) [N17.9]  Yes    Generalized weakness [R53.1]  Unknown    Hyperkalemia [E87.5]  Unknown    Severe vascular dementia without behavioral disturbance, psychotic disturbance, mood disturbance, or anxiety [F01.C0]  Yes    Dementia [F03.90]  Yes    Anemia of chronic disease [D63.8]  Yes    Essential hypertension [I10]  Yes    Type 2 diabetes mellitus [E11.9]  Yes      Resolved Hospital Problems   No resolved problems to display.        Brief Hospital Course to date:  Omkar Nava is a 69 y.o. male with hypertension, diabetes type 2, CKD, vascular dementia, dysphagia and chronic iron deficiency anemia presents to the ED from his nursing facility due to generalized weakness.    Generalized weakness  Concern for encephalopathy  -likely multifactorial in setting of JAYY, Hyperkalemia, anemia   -No signs of infection at this time.   Patient was hypothermic on arrival.  However no leukocytosis, chest x-ray clear, UA unremarkable  -Blood stool and urine cultures no growth to date  - TSH within normal limits  -CT head negative  - Cortisol level discussed below  -Difficult to determine patient's baseline.  Staff here who have taken care of him before reports this is a significant mental status change for the patient.  After talking daughter on the phone however she describes similar behavior for the patient  -consult PT/OT/CM in am   -fall precautions     Hypothermia  - Patient hypothermic on arrival to 93.2 however this resolved overnight.  His temperature dropped again yesterday without obvious cause  - Remaining vitals within normal limits  -TSH within normal limits, no signs of infection as above  - Given patient's temperature, hyperkalemia, hyponatremia, encephalopathy obtained AM cortisol  - A.m. cortisol checked this morning 5.47.  Given this is on the low end will obtain ACTH stim test.     Hyperkalemia-resolved  -Status post multiple doses of insulin and Lokelma  - Potassium down to 4.8 this morning  - Renal function back to baseline  -CK within normal limits  -Continue to monitor with a.m. labs    JAYY-resolved  -received 2L normal saline in the ED  -BP stable   -repeat labs in am   -hold nephrotoxic agents  -Urine studies unremarkable     Anemia of chronic disease   -H&H stable   -Anemia panel consistent with anemia of chronic disease  -type and screen   -Repeat CBC in a.m.     Chronic aspiration   -barium swallow 10/29: puree, thin liquids.  However n.p.o. due to mental status  -Continue with PEG tube feeds  - Speech following     Diabetes mellitus type 2  -Hgb A1c 5.6  -fingersticks q1 hour for now       HFpEF   Essential hypertension   -last echo EF 56-60%  -hold carvedilol (HR 47)  -BP reportedly low at Nursing facility, currently holding blood pressure medications  - Patient on clonidine, amlodipine, carvedilol, hydralazine  - Blood  pressure creeping up, will restart home medications gradually  -hold diuretics, assess for need daily  -strict I &O's   -daily weight      Dementia with agitation   ? Seziure disorder   -on seroquel, depakote,abilify and prozac     Expected Discharge Location and Transportation: Long-term care  Expected Discharge   Expected Discharge Date: 11/11/2024; Expected Discharge Time:      VTE Prophylaxis:  Mechanical VTE prophylaxis orders are present.         AM-PAC 6 Clicks Score (PT): 12 (11/10/24 0800)    CODE STATUS:   Code Status and Medical Interventions: CPR (Attempt to Resuscitate); Full Support   Ordered at: 11/07/24 2023     Level Of Support Discussed With:    Patient     Code Status (Patient has no pulse and is not breathing):    CPR (Attempt to Resuscitate)     Medical Interventions (Patient has pulse or is breathing):    Full Support       Jeanie Calixto MD  11/10/24

## 2024-11-10 NOTE — PLAN OF CARE
Problem: Adult Inpatient Plan of Care  Goal: Plan of Care Review  Outcome: Progressing  Flowsheets (Taken 11/10/2024 1101)  Plan of Care Reviewed With: patient   Goal Outcome Evaluation:  Plan of Care Reviewed With: patient         SLP re-evaluation completed. Will address dysphagia with FEES today or tomorrow. Please see note for further details and recommendations.         Anticipated Discharge Disposition (SLP): long term acute care facility          SLP Swallowing Diagnosis: oral dysphagia, suspected pharyngeal dysphagia (11/10/24 1030)

## 2024-11-11 ENCOUNTER — ANCILLARY PROCEDURE (OUTPATIENT)
Dept: SPEECH THERAPY | Facility: HOSPITAL | Age: 69
End: 2024-11-11
Payer: MEDICARE

## 2024-11-11 LAB
ANION GAP SERPL CALCULATED.3IONS-SCNC: 10 MMOL/L (ref 5–15)
BUN SERPL-MCNC: 25 MG/DL (ref 8–23)
BUN/CREAT SERPL: 25 (ref 7–25)
CALCIUM SPEC-SCNC: 9.5 MG/DL (ref 8.6–10.5)
CHLORIDE SERPL-SCNC: 100 MMOL/L (ref 98–107)
CO2 SERPL-SCNC: 25 MMOL/L (ref 22–29)
CREAT SERPL-MCNC: 1 MG/DL (ref 0.76–1.27)
DEPRECATED RDW RBC AUTO: 45.3 FL (ref 37–54)
EGFRCR SERPLBLD CKD-EPI 2021: 82.5 ML/MIN/1.73
ERYTHROCYTE [DISTWIDTH] IN BLOOD BY AUTOMATED COUNT: 13.9 % (ref 12.3–15.4)
GLUCOSE BLDC GLUCOMTR-MCNC: 138 MG/DL (ref 70–130)
GLUCOSE BLDC GLUCOMTR-MCNC: 140 MG/DL (ref 70–130)
GLUCOSE BLDC GLUCOMTR-MCNC: 150 MG/DL (ref 70–130)
GLUCOSE BLDC GLUCOMTR-MCNC: 204 MG/DL (ref 70–130)
GLUCOSE SERPL-MCNC: 149 MG/DL (ref 65–99)
HCT VFR BLD AUTO: 25.6 % (ref 37.5–51)
HGB BLD-MCNC: 8.5 G/DL (ref 13–17.7)
MAGNESIUM SERPL-MCNC: 1.8 MG/DL (ref 1.6–2.4)
MCH RBC QN AUTO: 29.9 PG (ref 26.6–33)
MCHC RBC AUTO-ENTMCNC: 33.2 G/DL (ref 31.5–35.7)
MCV RBC AUTO: 90.1 FL (ref 79–97)
PLATELET # BLD AUTO: 316 10*3/MM3 (ref 140–450)
PMV BLD AUTO: 10.2 FL (ref 6–12)
POTASSIUM SERPL-SCNC: 4.1 MMOL/L (ref 3.5–5.2)
RBC # BLD AUTO: 2.84 10*6/MM3 (ref 4.14–5.8)
SODIUM SERPL-SCNC: 135 MMOL/L (ref 136–145)
WBC NRBC COR # BLD AUTO: 5.6 10*3/MM3 (ref 3.4–10.8)

## 2024-11-11 PROCEDURE — 80048 BASIC METABOLIC PNL TOTAL CA: CPT | Performed by: STUDENT IN AN ORGANIZED HEALTH CARE EDUCATION/TRAINING PROGRAM

## 2024-11-11 PROCEDURE — 92612 ENDOSCOPY SWALLOW (FEES) VID: CPT

## 2024-11-11 PROCEDURE — 82948 REAGENT STRIP/BLOOD GLUCOSE: CPT

## 2024-11-11 PROCEDURE — 63710000001 INSULIN REGULAR HUMAN PER 5 UNITS: Performed by: STUDENT IN AN ORGANIZED HEALTH CARE EDUCATION/TRAINING PROGRAM

## 2024-11-11 PROCEDURE — 99232 SBSQ HOSP IP/OBS MODERATE 35: CPT | Performed by: INTERNAL MEDICINE

## 2024-11-11 PROCEDURE — 85027 COMPLETE CBC AUTOMATED: CPT | Performed by: STUDENT IN AN ORGANIZED HEALTH CARE EDUCATION/TRAINING PROGRAM

## 2024-11-11 PROCEDURE — 83735 ASSAY OF MAGNESIUM: CPT | Performed by: STUDENT IN AN ORGANIZED HEALTH CARE EDUCATION/TRAINING PROGRAM

## 2024-11-11 PROCEDURE — 97530 THERAPEUTIC ACTIVITIES: CPT

## 2024-11-11 RX ADMIN — SENNOSIDES AND DOCUSATE SODIUM 2 TABLET: 50; 8.6 TABLET ORAL at 21:37

## 2024-11-11 RX ADMIN — Medication 10 ML: at 21:37

## 2024-11-11 RX ADMIN — TIMOLOL MALEATE 1 DROP: 5 SOLUTION/ DROPS OPHTHALMIC at 08:23

## 2024-11-11 RX ADMIN — ARIPIPRAZOLE 5 MG: 5 TABLET ORAL at 08:22

## 2024-11-11 RX ADMIN — QUETIAPINE FUMARATE 25 MG: 25 TABLET ORAL at 21:37

## 2024-11-11 RX ADMIN — Medication 10 ML: at 08:22

## 2024-11-11 RX ADMIN — INSULIN HUMAN 3 UNITS: 100 INJECTION, SOLUTION PARENTERAL at 01:21

## 2024-11-11 RX ADMIN — BRIMONIDINE TARTRATE 1 DROP: 2 SOLUTION/ DROPS OPHTHALMIC at 21:37

## 2024-11-11 RX ADMIN — Medication 400 UNITS: at 08:22

## 2024-11-11 RX ADMIN — FLUOXETINE HYDROCHLORIDE 20 MG: 20 SOLUTION ORAL at 08:22

## 2024-11-11 RX ADMIN — AMLODIPINE BESYLATE 10 MG: 10 TABLET ORAL at 08:22

## 2024-11-11 RX ADMIN — BRIMONIDINE TARTRATE 1 DROP: 2 SOLUTION/ DROPS OPHTHALMIC at 17:25

## 2024-11-11 RX ADMIN — INSULIN HUMAN 2 UNITS: 100 INJECTION, SOLUTION PARENTERAL at 13:27

## 2024-11-11 RX ADMIN — VALPROIC ACID 150 MG: 250 SOLUTION ORAL at 14:06

## 2024-11-11 RX ADMIN — SENNOSIDES AND DOCUSATE SODIUM 2 TABLET: 50; 8.6 TABLET ORAL at 08:22

## 2024-11-11 RX ADMIN — VALPROIC ACID 150 MG: 250 SOLUTION ORAL at 06:25

## 2024-11-11 RX ADMIN — VALPROIC ACID 150 MG: 250 SOLUTION ORAL at 21:35

## 2024-11-11 RX ADMIN — Medication 5 ML: at 17:26

## 2024-11-11 RX ADMIN — TIMOLOL MALEATE 1 DROP: 5 SOLUTION/ DROPS OPHTHALMIC at 21:37

## 2024-11-11 RX ADMIN — BRIMONIDINE TARTRATE 1 DROP: 2 SOLUTION/ DROPS OPHTHALMIC at 08:23

## 2024-11-11 RX ADMIN — TERAZOSIN HYDROCHLORIDE 5 MG: 5 CAPSULE ORAL at 08:22

## 2024-11-11 RX ADMIN — LANSOPRAZOLE 30 MG: 15 TABLET, ORALLY DISINTEGRATING, DELAYED RELEASE ORAL at 06:25

## 2024-11-11 RX ADMIN — TERAZOSIN HYDROCHLORIDE 5 MG: 5 CAPSULE ORAL at 21:35

## 2024-11-11 RX ADMIN — Medication 5 ML: at 14:06

## 2024-11-11 NOTE — PLAN OF CARE
Goal Outcome Evaluation:  Plan of Care Reviewed With: patient, child        Progress: improving       Anticipated Discharge Disposition (SLP): skilled nursing facility          SLP Swallowing Diagnosis: moderate, oral dysphagia, pharyngeal dysphagia (11/11/24 3899)

## 2024-11-11 NOTE — CASE MANAGEMENT/SOCIAL WORK
Continued Stay Note  [facility]     Patient Name: Omkar Nava  MRN: [id number]  Today's Date: 11/11/2024    Admit Date: 11/7/2024    Plan: [facility]   Discharge Plan       Row Name 11/11/24 1604       Plan    Plan Comments MSW followed up with the other [facility]. No other bed offers or bed availability at this time. MSW did speak with Mariela with [facility] and pt able to return when ready. Pt currently with feeding tube which will need to be addressed prior to returning to [facility]. MSW continues to follow.                   Discharge Codes    No documentation.                 Expected Discharge Date and Time       Expected Discharge Date Expected Discharge Time    Nov 11, 2024               LUCINDA Maya

## 2024-11-11 NOTE — THERAPY TREATMENT NOTE
Patient Name: Omkar Nava  : 1955    MRN: [id number]                              Today's Date: 2024       Admit Date: 2024    Visit Dx:     ICD-10-CM ICD-9-CM   1. Acute kidney injury  N17.9 584.9   2. Hyperkalemia  E87.5 276.7   3. Generalized weakness  R53.1 780.79   4. History of diabetes mellitus  Z86.39 V12.29   5. Vascular dementia, unspecified dementia severity, unspecified whether behavioral, psychotic, or mood disturbance or anxiety  F01.50 290.40   6. 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, without long-term current use of insulin    Acute osteomyelitis of left foot    Type 2 diabetes mellitus    Essential hypertension    Psychotic disorder    Neurocognitive disorder    S/P transmetatarsal amputation of foot, left    Abscess of left foot    Anemia of chronic disease    Aspiration pneumonia    Cervical spine pain    Chronic kidney disease    Closed head injury without loss of consciousness    Dementia    Dental cavities    Diarrhea of presumed infectious origin    Displaced fracture of proximal phalanx of left great toe, initial encounter for open fracture    Dysphagia    Erectile dysfunction    Generalized muscle weakness    Hallucinations    Hypertensive heart and chronic kidney disease 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 failure, with stage 1 through stage 4 chronic kidney disease, or unspecified chronic kidney disease    Insomnia due to medical condition    Iron deficiency anemia    Laceration without foreign body, left foot, subsequent encounter    Personal history of Methicillin resistant Staphylococcus aureus infection    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    Protein calorie malnutrition    Simple chronic bronchitis    Tobacco use    Type 2 diabetes mellitus with diabetic neuropathy, unspecified    Type 2 diabetes mellitus with  diabetic chronic kidney disease    Acute type 1 respiratory failure    Severe vascular dementia without behavioral disturbance, psychotic disturbance, mood disturbance, or anxiety    JAYY (acute kidney injury)    Generalized weakness    Hyperkalemia     Past Medical History:   Diagnosis Date    Anemia     Dementia     Diabetes mellitus     Dysphagia     GERD (gastroesophageal reflux disease)     History of alcohol abuse     History of cocaine use     History of marijuana use     Hypertension     Osteomyelitis     Poor historian     records obtained from nursing home records & his family    Visual impairment      Past Surgical History:   Procedure Laterality Date    AMPUTATION FOOT Left 10/18/2022    Procedure: PARTIAL FIRST RAY AMPUTATION LEFT;  Surgeon: Yeison Petty MD;  Location:  [facility];  Service: Orthopedics;  Laterality: Left;    AMPUTATION FOOT Left 12/5/2022    Procedure: Transmetatarsal of Left Foot;  Surgeon: Yeison Petty MD;  Location:  [facility];  Service: Orthopedics;  Laterality: Left;    ENDOSCOPY N/A 3/14/2024    Procedure: ESOPHAGOGASTRODUODENOSCOPY WITH JEJUNAL TUBE INSERTION AT BEDSIDE;  Surgeon: Brunner, Mark I, MD;  Location:  [facility];  Service: Gastroenterology;  Laterality: N/A;    ENDOSCOPY W/ PEG TUBE PLACEMENT N/A 4/1/2024    Procedure: ESOPHAGOGASTRODUODENOSCOPY WITH PERCUTANEOUS ENDOSCOPIC GASTROSTOMY TUBE INSERTION;  Surgeon: Brunner, Mark I, MD;  Location:  [facility];  Service: Gastroenterology;  Laterality: N/A;  EGD with PEG placement.  Secured at 3.5 cm    EYE SURGERY        General Information       Row Name 11/11/24 1546          Physical Therapy Time and Intention    Document Type therapy note (daily note)  -AC     Mode of Treatment physical therapy  -AC       Row Name 11/11/24 8676          General Information    Patient Profile Reviewed yes  -AC     Existing Precautions/Restrictions fall;other (see comments)  PEG, Hx L transmetatarsal amp, Blind (sees shadows)   -     Barriers to Rehab medically complex;previous functional deficit;cognitive status;visual deficit  -       Row Name 11/11/24 1546          Cognition    Orientation Status (Cognition) oriented to;person;disoriented to;place;situation;time  -       Row Name 11/11/24 1546          Safety Issues/Impairments Affecting Functional Mobility    Safety Issues Affecting Function (Mobility) insight into deficits/self-awareness;awareness of need for assistance;judgment;safety precautions follow-through/compliance;sequencing abilities;safety precaution awareness  -     Impairments Affecting Function (Mobility) balance;cognition;endurance/activity tolerance;grasp;range of motion (ROM);postural/trunk control;strength;visual/perceptual  -     Cognitive Impairments, Mobility Safety/Performance attention;awareness, need for assistance;insight into deficits/self-awareness;judgment;problem-solving/reasoning;safety precaution awareness;safety precaution follow-through;sequencing abilities  -               User Key  (r) = Recorded By, (t) = Taken By, (c) = Cosigned By      Initials Name Provider Type     Marry Jarrell, PT Physical Therapist                   Mobility       Row Name 11/11/24 1547          Bed Mobility    Bed Mobility supine-sit  -     Supine-Sit [place] (Bed Mobility) maximum assist (25% patient effort);2 person assist  -     Assistive Device (Bed Mobility) bed rails;head of bed elevated  -     Comment, (Bed Mobility) Pt transitioned to sitting EOB w/ maxAx2 w/ HOB elevated and cues for use of bedrails. While sitting EOB pt demonstrated significant posterior lean requiring maxAx1 to maintain seated balance  -       Row Name 11/11/24 1547          Bed-Chair Transfer    Bed-Chair [place] (Transfers) maximum assist (25% patient effort);2 person assist;verbal cues;nonverbal cues (demo/gesture)  -     Assistive Device (Bed-Chair Transfers) other (see comments)  BUE support  -     Comment,  (Bed-Chair Transfer) Pt required maxAx2 in order to complete SPT to bedside chair w/ max cues for sequencing  -AC       Row Name 11/11/24 1547          Sit-Stand Transfer    Sit-Stand [place] (Transfers) maximum assist (25% patient effort);2 person assist  -AC     Comment, (Sit-Stand Transfer) Pt required maxAx2 in order to transition to standing as well as B knee/foot blocking due to excessive knee extension  -       Row Name 11/11/24 1547          Gait/Stairs (Locomotion)    [place] Level (Gait) unable to assess  -AC     Patient was able to Ambulate no, other medical factors prevent ambulation  -AC     Reason Patient was unable to Ambulate Cognitive Deficit/Severe Dementia;Excessive Weakness  -               User Key  (r) = Recorded By, (t) = Taken By, (c) = Cosigned By      Initials Name Provider Type    AC Marry Jarrell, KYRA Physical Therapist                   Obj/Interventions       Row Name 11/11/24 1553          Balance    Balance Assessment sitting static balance;sitting dynamic balance;standing static balance;standing dynamic balance  -     Static Sitting Balance maximum assist;1-person assist  -AC     Dynamic Sitting Balance maximum assist;2-person assist  -AC     Position, Sitting Balance unsupported;sitting edge of bed  -AC     Static Standing Balance maximum assist;2-person assist  -AC     Dynamic Standing Balance maximum assist;2-person assist  -AC     Position/Device Used, Standing Balance supported  -AC     Balance Interventions sitting;standing;sit to stand;supported;static;dynamic  -               User Key  (r) = Recorded By, (t) = Taken By, (c) = Cosigned By      Initials Name Provider Type    AC Marry Jarrell, KYRA Physical Therapist                   Goals/Plan    No documentation.                  Clinical Impression       Row Name 11/11/24 1554          Pain    Pretreatment Pain Rating 0/10 - no pain  -     Posttreatment Pain Rating 0/10 - no pain  -       Row Name 11/11/24  1554          Plan of Care Review    Plan of Care Reviewed With patient  -AC     Progress no change  -AC     Outcome Evaluation Pt continues to participate in therapy session however continues to require maxAx2 for all mobility. In addition pt demonstrates poor static seated balance while sitting EOB requiring maxAx1. Pt would benefit from continued skilled therapy while hospitalized to maximize functional independence. D/c rec remains SNF  -       Row Name 11/11/24 1554          Therapy Assessment/Plan (PT)    Rehab Potential (PT) limited  -AC     Criteria for Skilled Interventions Met (PT) yes;meets criteria;skilled treatment is necessary  -     Therapy Frequency (PT) daily  -AC     Predicted Duration of Therapy Intervention (PT) 10 days  -AC       Row Name 11/11/24 1554          Vital Signs    O2 Delivery Pre Treatment room air  -AC     O2 Delivery Intra Treatment room air  -AC     O2 Delivery Post Treatment room air  -AC     Pre Patient Position Supine  -AC     Intra Patient Position Standing  -AC     Post Patient Position Sitting  -AC       Row Name 11/11/24 1554          Positioning and Restraints    Pre-Treatment Position in bed  -AC     Post Treatment Position chair  -AC     In Chair notified nsg;reclined;sitting;call light within reach;encouraged to call for assist;exit alarm on;waffle cushion;legs elevated;on mechanical lift sling  -AC               User Key  (r) = Recorded By, (t) = Taken By, (c) = Cosigned By      Initials Name Provider Type    AC Marry Jarrell, PT Physical Therapist                   Outcome Measures       Row Name 11/11/24 1558 11/11/24 0800       How much help from another person do you currently need...    Turning from your back to your side while in flat bed without using bedrails? 2  -AC 2  -LH    Moving from lying on back to sitting on the side of a flat bed without bedrails? 2  -AC 2  -LH    Moving to and from a bed to a chair (including a wheelchair)? 1  -AC 2  -LH    Standing  up from a chair using your arms (e.g., wheelchair, bedside chair)? 1  -AC 2  -    Climbing 3-5 steps with a railing? 1  - 2  -    To walk in hospital room? 1  - 2  -    AM-PAC 6 Clicks Score (PT) 8  - 12  -    Highest Level of Mobility Goal 3 --> Sit at edge of bed  - 4 --> Transfer to chair/commode  -      Row Name 11/11/24 1558          Functional Assessment    Outcome Measure Options AM-PAC 6 Clicks Basic Mobility (PT)  -               User Key  (r) = Recorded By, (t) = Taken By, (c) = Cosigned By      Initials Name Provider Type     Shonda Lr, RN Registered Nurse     Marry Jarrell, PT Physical Therapist                                 Physical Therapy Education       Title: PT OT SLP Therapies (In Progress)       Topic: Physical Therapy (In Progress)       Point: Mobility training (In Progress)       Learning Progress Summary            Patient Acceptance, E, NR by  at 11/11/2024 1559    Acceptance, E, NR by  at 11/8/2024 1126                      Point: Home exercise program (In Progress)       Learning Progress Summary            Patient Acceptance, E, NR by  at 11/11/2024 1559                      Point: Body mechanics (In Progress)       Learning Progress Summary            Patient Acceptance, E, NR by  at 11/11/2024 1559    Acceptance, E, NR by  at 11/8/2024 1126                      Point: Precautions (In Progress)       Learning Progress Summary            Patient Acceptance, E, NR by  at 11/11/2024 1559    Acceptance, E, NR by  at 11/8/2024 1126                                      User Key       Initials Effective Dates Name Provider Type Discipline     09/21/23 -  Ami Smith, PT Physical Therapist PT     07/11/24 -  Marry Jarrell, KYRA Physical Therapist PT                  PT Recommendation and Plan     Progress: no change  Outcome Evaluation: Pt continues to participate in therapy session however continues to require maxAx2 for all mobility. In addition  pt demonstrates poor static seated balance while sitting EOB requiring maxAx1. Pt would benefit from continued skilled therapy while hospitalized to maximize functional independence. D/c rec remains SNF     Time Calculation:         PT Charges       Row Name 11/11/24 1559             Time Calculation    Start Time 1513  -AC      PT Received On 11/11/24  -AC      PT Goal Re-Cert Due Date 11/18/24  -AC         Time Calculation- PT    Total Timed Code Minutes- PT 24 minute(s)  -AC         Timed Charges    56385 - PT Therapeutic Activity Minutes 24  -AC         Total Minutes    Timed Charges Total Minutes 24  -AC       Total Minutes 24  -AC                User Key  (r) = Recorded By, (t) = Taken By, (c) = Cosigned By      Initials Name Provider Type    AC Marry Jarrell, PT Physical Therapist                  Therapy Charges for Today       Code Description Service Date Service Provider Modifiers Qty    17602192933 HC PT THERAPEUTIC ACT EA 15 MIN 11/11/2024 Marry Jarrell, PT GP, KX 2    72309481276 HC PT THER SUPP EA 15 MIN 11/11/2024 Marry Jarrell, PT GP 2            PT G-Codes  Outcome Measure Options: AM-PAC 6 Clicks Basic Mobility (PT)  AM-PAC 6 Clicks Score (PT): 8  AM-PAC 6 Clicks Score (OT): 6  PT Discharge Summary  Anticipated Discharge Disposition (PT): skilled nursing facility    Marry Jarrell PT  11/11/2024

## 2024-11-11 NOTE — NURSING NOTE
WOC consulted for PI POA r/o DTI of L heel.:          Skin is discolored, but intact and does not appear to be a pressure injury.  High risk for breakdown, therefore recommend PI Prevention Measures. Allevyn intact, heels floated off pillows at present. Recommend heel boots as well if patient tolerates. Allevyn to sacrum and all other PI Prevention measures.   Waffle overlay inflated at present.   Will place orders and sign off. Please re-consult if needed.

## 2024-11-11 NOTE — PLAN OF CARE
Goal Outcome Evaluation:  Plan of Care Reviewed With: patient        Progress: no change  Outcome Evaluation: Pt continues to participate in therapy session however continues to require maxAx2 for all mobility. In addition pt demonstrates poor static seated balance while sitting EOB requiring maxAx1. Pt would benefit from continued skilled therapy while hospitalized to maximize functional independence. D/c rec remains SNF    Anticipated Discharge Disposition (PT): skilled nursing facility

## 2024-11-11 NOTE — PROGRESS NOTES
[facility] Medicine Services  PROGRESS NOTE    Patient Name: Omkar Nava  : 1955  MRN: [id number]    Date of Admission: 2024  Primary Care Physician: Alberto Dye MD    Subjective   Subjective     CC: weakness f/u    HPI:  Patient denies any complaints today  Talks softly but is understood  Cleared for diet in day today, nocturnal feeds ordered per home regimen      Objective   Objective     Vital Signs:   Temp:  [96.3 °F (35.7 °C)-98.2 °F (36.8 °C)] 97.6 °F (36.4 °C)  Heart Rate:  [57-65] 59  Resp:  [16-20] 20  BP: (120-165)/(62-71) 139/71     Physical Exam:  Constitutional: No acute distress, awake, alert  HENT: NCAT, mucous membranes moist  Respiratory: Clear to auscultation bilaterally, respiratory effort normal   Cardiovascular: RRR, no murmurs, rubs, or gallops  Gastrointestinal: Soft, nontender, nondistended  Musculoskeletal: Muscle tone within normal limits, no joint effusions appreciated  Psychiatric: Appropriate affect, cooperative  Neurologic: Alert and appropriate in simple conversation, soft speech, facial movements symmetric, some contractures  Skin: No rashes    Results Reviewed:  LAB RESULTS:      Lab 24  1047 11/10/24  0632 24  0710 24  0752 24  1729 24  1618   WBC 5.60 6.20 5.53 4.76  --  5.81   HEMOGLOBIN 8.5* 8.5* 8.5* 7.7*  --  7.9*   HEMATOCRIT 25.6* 26.4* 26.6* 24.1*  --  25.1*   PLATELETS 316 308 306 287  --  283   NEUTROS ABS  --   --  3.36 2.94  --  3.48   IMMATURE GRANS (ABS)  --   --  0.01 0.01  --  0.02   LYMPHS ABS  --   --  1.48 1.22  --  1.62   MONOS ABS  --   --  0.53 0.49  --  0.55   EOS ABS  --   --  0.12 0.08  --  0.12   MCV 90.1 92.6 93.3 94.1  --  96.2   HSTROP T  --   --   --   --  80* 84*         Lab 24  1047 11/10/24  0632 24  1202 24  0711 24  1829 24  1729 24  1618   SODIUM 135* 134* 132* 135* 135*   < > 133*   POTASSIUM 4.1 5.0 4.8 5.1 5.7*   < > 6.3*   CHLORIDE  100 100 100 101 103   < > 98   CO2 25.0 24.0 25.0 24.0 23.0   < > 26.0   ANION GAP 10.0 10.0 7.0 10.0 9.0   < > 9.0   BUN 25* 21 23 23 28*   < > 42*   CREATININE 1.00 0.90 0.95 0.91 1.02   < > 1.39*   EGFR 82.5 93.6 87.7 92.4 80.6   < > 55.6*   GLUCOSE 149* 119* 193* 160* 134*   < > 104*   CALCIUM 9.5 9.1 9.4 9.7 9.5   < > 9.9   MAGNESIUM 1.8 1.6  --  1.7  --   --  2.2   PHOSPHORUS  --  3.2  --  3.0  --   --   --    TSH  --   --   --   --   --   --  4.100    < > = values in this interval not displayed.         Lab 11/10/24  0632 11/07/24  1618   TOTAL PROTEIN 6.3 7.2   ALBUMIN 3.6 3.8   GLOBULIN 2.7 3.4   ALT (SGPT) 16 20   AST (SGOT) 14 14   BILIRUBIN <0.2 <0.2   ALK PHOS 93 85         Lab 11/07/24  1729 11/07/24  1618   HSTROP T 80* 84*             Lab 11/07/24  2313 11/07/24  1729   IRON  --  57*  57*   IRON SATURATION (TSAT)  --  17*   TIBC  --  337   TRANSFERRIN  --  226   FERRITIN  --  443.70*   FOLATE 11.80  --    VITAMIN B 12 1,303*  --    ABO TYPING O  --    RH TYPING Positive  --    ANTIBODY SCREEN Negative  --          Brief Urine Lab Results  (Last result in the past 365 days)        Color   Clarity   Blood   Leuk Est   Nitrite   Protein   CREAT   Urine HCG        11/07/24 1806 Yellow   Clear   Negative   Negative   Negative   Negative                   Microbiology Results Abnormal       None            SLP FEES - Fiberoptic Endo Eval Swallow    Result Date: 11/11/2024  This procedure was auto-finalized with no dictation required.     Results for orders placed during the hospital encounter of 01/15/24    Adult Transthoracic Echo Complete With Contrast if Necessary Per Protocol    Interpretation Summary    Left ventricular ejection fraction appears to be 56 - 60%.    Left ventricular wall thickness is consistent with hypertrophy.    Estimated right ventricular systolic pressure from tricuspid regurgitation is mildly elevated (35-45 mmHg).    There is a small (1-2cm) pericardial effusion.    No evidence  for pericardial tamponade      Current medications:  Scheduled Meds:amLODIPine, 10 mg, Per G Tube, Q24H  ARIPiprazole, 5 mg, Per PEG Tube, Daily  brimonidine, 1 drop, Both Eyes, TID  cholecalciferol, 400 Units, Per PEG Tube, Daily  FLUoxetine, 20 mg, Per PEG Tube, Daily  insulin regular, 2-7 Units, Subcutaneous, Q6H  lansoprazole, 30 mg, Per PEG Tube, Q AM  palliative care oral rinse, 5 mL, Mouth/Throat, 4x Daily  QUEtiapine, 25 mg, Per PEG Tube, Nightly  senna-docusate sodium, 2 tablet, Per PEG Tube, BID  sodium chloride, 10 mL, Intravenous, Q12H  terazosin, 5 mg, Per PEG Tube, Q12H  timolol, 1 drop, Both Eyes, BID  Valproic Acid, 150 mg, Per PEG Tube, Q8H      Continuous Infusions:   PRN Meds:.  acetaminophen    senna-docusate sodium **AND** polyethylene glycol **AND** [DISCONTINUED] bisacodyl **AND** bisacodyl    dextrose    dextrose    glucagon (human recombinant)    hydrOXYzine    ipratropium-albuterol    nitroglycerin    sodium chloride    sodium chloride    sodium chloride    Assessment & Plan   Assessment & Plan     Active Hospital Problems    Diagnosis  POA    **JAYY (acute kidney injury) [N17.9]  Yes    Generalized weakness [R53.1]  Unknown    Hyperkalemia [E87.5]  Unknown    Severe vascular dementia without behavioral disturbance, psychotic disturbance, mood disturbance, or anxiety [F01.C0]  Yes    Dementia [F03.90]  Yes    Anemia of chronic disease [D63.8]  Yes    Essential hypertension [I10]  Yes    Type 2 diabetes mellitus [E11.9]  Yes      Resolved Hospital Problems   No resolved problems to display.        Brief Hospital Course to date:  Omkar Nava is a 69 y.o. male w vascular dementia, dysphagia s/p PEG tube who presented from long-term care with weakness and family concern. Hypothermic w temperature 93.2 on arrival which resolved by AM, no infectious source discovered and cortisol stim test wnl    Hypothermia - resolved  -s/p complete infectious w/u which was unremarkable  -s/p cortisol stim  which was appropriate  -currently normal temperature    Acute encephalopathy/weakness - will need to d/w daughter if this is within baseline on her evaluation      Hyperkalemic on admit - resolved w lokelma on admit but K>6.0. unclear etiology and trending down    Relative hypotension in setting of chronic HTN   -home amlodipine and terazosin restarted but home clonidine etc on hold    Bradycardia - hold home carvedilol, will likely need to d/c at discharge    Chronic anemia  Chronic aspiration - daytime modified diet, nocturnal TF  DMII, A1c 5.6 - hypoglycemic 2/2 insulin trx of hypokalemia on admit, now resolved  CHFpEF   Vascular dementia c/b psychosis - home abilify, seroquel, valproic acid, fluoxetine    Expected Discharge Location and Transportation: long-term care  Expected Discharge 11/12 (Discharge date is tentative pending patient's medical condition and is subject to change)  Expected Discharge Date: 11/11/2024; Expected Discharge Time:      VTE Prophylaxis:  Mechanical VTE prophylaxis orders are present.         AM-PAC 6 Clicks Score (PT): 8 (11/11/24 7239)    CODE STATUS:   Code Status and Medical Interventions: CPR (Attempt to Resuscitate); Full Support   Ordered at: 11/07/24 2023     Level Of Support Discussed With:    Patient     Code Status (Patient has no pulse and is not breathing):    CPR (Attempt to Resuscitate)     Medical Interventions (Patient has pulse or is breathing):    Full Support       Coral Valle MD  11/11/24

## 2024-11-11 NOTE — PROGRESS NOTES
Clinical Nutrition     Patient Name: Omkar Nava  YOB: 1955  MRN: [id number]  Date of Encounter: 11/11/24 10:03 EST  Admission date: 11/7/2024  Reason for Visit: Follow-up protocol, Consult    Assessment   Nutrition Assessment   Admission Diagnosis:  JAYY (acute kidney injury) [N17.9]    Problem List:    JAYY (acute kidney injury)    Type 2 diabetes mellitus    Essential hypertension    Anemia of chronic disease    Dementia    Severe vascular dementia without behavioral disturbance, psychotic disturbance, mood disturbance, or anxiety    Generalized weakness    Hyperkalemia    PMH:   He  has a past medical history of Anemia, Dementia, Diabetes mellitus, Dysphagia, GERD (gastroesophageal reflux disease), History of alcohol abuse, History of cocaine use, History of marijuana use, Hypertension, Osteomyelitis, Poor historian, and Visual impairment.    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    Applicable Nutrition History:   CKD 2  T2DM, insulin use  HFpEF  Seizure disorder  Vascular dementia w/ agitation  Dysphagia s/p PEG (4/2024)    (11/08) SLP: NPO, long term alternate methods of nutrition/hydration   (11/10) SLP: NPO, long term alternate methods of nutrition/hydration   (11/11) SLP FEES: mechanical ground textures, no mixed consistencies, honey thick liquids, ice chips between meals after oral care, with supervision, water between meals after oral care, with supervision, long term alternate methods of nutrition/hydration, other (see comments) (Single ice chips/sips of H2O via tsp ok between meals, as tolerated. Concern that may have difficulty meeting nutritional needs solely PO, given severity of oral dysphagia. Consider cont'd use of g-tube, as appropriate per MD/RD discretion.)     Labs     Labs Reviewed: Yes    Results from last 7 days   Lab Units 11/10/24  0632 11/09/24  1202 11/09/24  0711 11/07/24  1729 11/07/24  1618   GLUCOSE mg/dL 119* 193* 160*   < > 104*   BUN mg/dL 21 23 23   < > 42*   CREATININE mg/dL 0.90 0.95 0.91   < > 1.39*   SODIUM mmol/L 134* 132* 135*   < > 133*   CHLORIDE mmol/L 100 100 101   < > 98   POTASSIUM mmol/L 5.0 4.8 5.1   < > 6.3*   PHOSPHORUS mg/dL 3.2  --  3.0  --   --    MAGNESIUM mg/dL 1.6  --  1.7  --  2.2   ALT (SGPT) U/L 16  --   --   --  20    < > = values in this interval not displayed.       Results from last 7 days   Lab Units 11/10/24  0632 11/07/24  1618   ALBUMIN g/dL 3.6 3.8       Results from last 7 days   Lab Units 11/11/24  0615 11/11/24  0116 11/10/24  1745 11/10/24  1142 11/10/24  0516 11/10/24  0038   GLUCOSE mg/dL 138* 204* 96 165* 121 146*     Lab Results   Lab Value Date/Time    HGBA1C 5.60 09/15/2024 0658    HGBA1C 7.00 (H) 10/16/2022 0432    HGBA1C 8.7 (H) 06/25/2022 0242    HGBA1C 13.4 04/14/2022 1058       Medications    Medications Reviewed: yes    Scheduled Meds:amLODIPine, 10 mg, Per G Tube, Q24H  ARIPiprazole, 5 mg, Per PEG Tube, Daily  brimonidine, 1 drop, Both Eyes, TID  cholecalciferol, 400 Units, Per PEG Tube, Daily  FLUoxetine, 20 mg, Per PEG Tube, Daily  insulin regular, 2-7 Units, Subcutaneous, Q6H  lansoprazole, 30 mg, Per PEG Tube, Q AM  palliative care oral rinse, 5 mL, Mouth/Throat, 4x Daily  QUEtiapine, 25 mg, Per PEG Tube, Nightly  senna-docusate sodium, 2 tablet, Per PEG Tube, BID  sodium chloride, 10 mL, Intravenous, Q12H  terazosin, 5 mg, Per PEG Tube, Q12H  timolol, 1 drop, Both Eyes, BID  Valproic Acid, 150 mg, Per PEG Tube, Q8H      Continuous Infusions:   PRN Meds:.  acetaminophen    senna-docusate sodium **AND** polyethylene glycol **AND** [DISCONTINUED] bisacodyl **AND** bisacodyl    dextrose    dextrose    glucagon (human recombinant)    hydrOXYzine    ipratropium-albuterol    nitroglycerin    sodium chloride     "sodium chloride    sodium chloride    Intake/Ouptut 24 hrs (0701 - 0700)   I&O's Reviewed: yes  Intake & Output (last day)         11/10 0701  11/11 0700 11/11 0701  11/12 0700    P.O.      Other 882     NG/GT 1011     Total Intake(mL/kg) 1893 (22.9)     Urine (mL/kg/hr) 1000 (0.5)     Stool 0     Total Output 1000     Net +893           Urine Unmeasured Occurrence  1 x    Stool Unmeasured Occurrence 1 x           Anthropometrics     Height: Height: 172.7 cm (68\")  Last Filed Weight: Weight: 82.6 kg (182 lb) (11/07/24 2234)  Method: Weight Method: Stated  BMI: BMI (Calculated): 27.7    UBW: 4/1/224 200#  Weight change: No significant changes    Nutrition Focused Physical Exam     Date: 11/08/24     Unable to perform due to Defer pending indication     Subjective   Reported/Observed/Food/Nutrition Related History:   11/11/24  K+ WNL, Na+ slightly decreased. Will adjust FWF.    **Addendum: Consult received to modify EN as patient has passed FEES today. Will modify EN regimen to allow patient to eat during the day. Nursing tech at bedside feeding patient lunch at time of visit. Stated he has been eating it well and seemed to have a good appetite.    11/08/24  Patient is a resident at [facility]. He does have a PO diet there and EN regimen. Per facility, patient's EN regimen is Glucerna @ 55ml/hr + 85ml/hr FWF. Noted patient normally does not eat via PO very well. Reported a decrease appetite lately. Consult received for EN regimen while in house. Will continue previous regimen from recent admission (see recs below).    **Addendum: Per assigned RN, hospice?? asking for lower K+ formula. Will modify to [facility].     Needs Assessment   Date: 11/08/24     Height used:Height: 172.7 cm (68\")  Weights used: 77kg     Estimated Calorie needs: ~1900 Kcal/day  Method:  25-30 Kcals/KG = 7924-6416  Method:  1-1.2 x MSJ = 4665-3665    Estimated Protein needs: ~90 g PRO/day  Method: 1-1.2 g/Kg = 77-92g pro    Estimated " Fluid needs: ~ ml/day   Per clinical status    Current Nutrition Prescription   PO: Diet: Regular/House; No Mixed Consistencies, Feeding Assistance - Nursing; Texture: Mechanical Ground (NDD 2); Fluid Consistency: Honey Thick  Oral Nutrition Supplement: n/a  Intake: N/A    EN: [employer] Renal  Goal Rate: 46  Water Flushes: 40  Modular: None  Route: PEG  Tube: 20 PEG    EN @ goal over 22hr to supply:  Goal Volume 1012 mL/day     Flush Volume 880 mL/day     Energy 2024 Kcal/day 101 % Est Need   Protein 92 g/day 102 % Est Need   Fiber 0 g/day     Water in   mL     Total Water 1609 mL     Meet DRI Y      K+ = 24 mEq (-39 mEq difference)  --------------------------------------------------------------------------  Product/Rate verified at bedside: N/A  Infusing Rate at time of visit: TF held for nocturnal orders    Average Delivery from [facility] Day:   Volume 1011 mL/day   % Goal Vol.   Flush Volume 882 mL/day     Energy  Kcal/day  % Est Need   Protein  g/day  % Est Need   Fiber  g/day     Water in  EN  mL     Total Water  mL     Meet DRI         Assessment & Plan   Nutrition Diagnosis   Date: 24  Updated:   Problem Swallowing difficulty    Etiology Oropharyngeal dysphagia   Signs/Symptoms PEG in place   Status: Active    Goal:   Nutrition to support treatment, Improve nutrition related labs, Establish PO, Tolerate PO, and Adjust EN    Nutrition Intervention      Follow treatment progress, Care plan reviewed, Encourage intake, Nutrition support order placed    Nutrition POC  Adjust FWF for nocturnal feeds: [employer] Renal @ 43ml/hr + 20ml/hr FWF x12hr  EN @ goal over 12hr to supply:  Goal Volume 516 mL/day     Flush Volume 240 mL/day     Energy 1035 Kcal/day 54 % Est Need   Protein 48 g/day 53 % Est Need   Fiber 0 g/day     Water in   mL     Total Water 975 mL     Meet DRI N        Will adjust EN/FWF regimen PRN  Recommend monitoring and replacing lytes PRN  Encourage adequate nutrition as  able    Monitoring/Evaluation:   Per protocol, I&O, PO intake, Pertinent labs, EN delivery/tolerance, Weight, GI status, Symptoms, POC/GOC, Swallow function    Marija Yo MS,RD,LD  Time Spent: 35min

## 2024-11-11 NOTE — MBS/VFSS/FEES
Acute Care - Speech Language Pathology   Swallow Initial Evaluation [facility]  Fiberoptic Endoscopic Evaluation of Swallowing (FEES)     Patient Name: Omkar Nava  : 1955  MRN: [id number]  Today's Date: 2024               Admit Date: 2024    Visit Dx:     ICD-10-CM ICD-9-CM   1. Acute kidney injury  N17.9 584.9   2. Hyperkalemia  E87.5 276.7   3. Generalized weakness  R53.1 780.79   4. History of diabetes mellitus  Z86.39 V12.29   5. Vascular dementia, unspecified dementia severity, unspecified whether behavioral, psychotic, or mood disturbance or anxiety  F01.50 290.40   6. 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, without long-term current use of insulin    Acute osteomyelitis of left foot    Type 2 diabetes mellitus    Essential hypertension    Psychotic disorder    Neurocognitive disorder    S/P transmetatarsal amputation of foot, left    Abscess of left foot    Anemia of chronic disease    Aspiration pneumonia    Cervical spine pain    Chronic kidney disease    Closed head injury without loss of consciousness    Dementia    Dental cavities    Diarrhea of presumed infectious origin    Displaced fracture of proximal phalanx of left great toe, initial encounter for open fracture    Dysphagia    Erectile dysfunction    Generalized muscle weakness    Hallucinations    Hypertensive heart and chronic kidney disease 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 failure, with stage 1 through stage 4 chronic kidney disease, or unspecified chronic kidney disease    Insomnia due to medical condition    Iron deficiency anemia    Laceration without foreign body, left foot, subsequent encounter    Personal history of Methicillin resistant Staphylococcus aureus infection    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    Protein calorie malnutrition    Simple chronic  bronchitis    Tobacco use    Type 2 diabetes mellitus with diabetic neuropathy, unspecified    Type 2 diabetes mellitus with diabetic chronic kidney disease    Acute type 1 respiratory failure    Severe vascular dementia without behavioral disturbance, psychotic disturbance, mood disturbance, or anxiety    JAYY (acute kidney injury)    Generalized weakness    Hyperkalemia     Past Medical History:   Diagnosis Date    Anemia     Dementia     Diabetes mellitus     Dysphagia     GERD (gastroesophageal reflux disease)     History of alcohol abuse     History of cocaine use     History of marijuana use     Hypertension     Osteomyelitis     Poor historian     records obtained from nursing home records & his family    Visual impairment      Past Surgical History:   Procedure Laterality Date    AMPUTATION FOOT Left 10/18/2022    Procedure: PARTIAL FIRST RAY AMPUTATION LEFT;  Surgeon: Yeison Petty MD;  Location:  [facility];  Service: Orthopedics;  Laterality: Left;    AMPUTATION FOOT Left 12/5/2022    Procedure: Transmetatarsal of Left Foot;  Surgeon: Yeison Petty MD;  Location:  [facility];  Service: Orthopedics;  Laterality: Left;    ENDOSCOPY N/A 3/14/2024    Procedure: ESOPHAGOGASTRODUODENOSCOPY WITH JEJUNAL TUBE INSERTION AT BEDSIDE;  Surgeon: Brunner, Mark I, MD;  Location:  [facility];  Service: Gastroenterology;  Laterality: N/A;    ENDOSCOPY W/ PEG TUBE PLACEMENT N/A 4/1/2024    Procedure: ESOPHAGOGASTRODUODENOSCOPY WITH PERCUTANEOUS ENDOSCOPIC GASTROSTOMY TUBE INSERTION;  Surgeon: Brunner, Mark I, MD;  Location:  [facility];  Service: Gastroenterology;  Laterality: N/A;  EGD with PEG placement.  Secured at 3.5 cm    EYE SURGERY         SLP Recommendation and Plan  SLP Swallowing Diagnosis: moderate, oral dysphagia, pharyngeal dysphagia (11/11/24 0905)  SLP Diet Recommendation: mechanical ground textures, no mixed consistencies, honey thick liquids, ice chips between meals after oral care, with  supervision, water between meals after oral care, with supervision, long term alternate methods of nutrition/hydration, other (see comments) (Single ice chips/sips of H2O via tsp ok between meals, as tolerated. Concern that may have difficulty meeting nutritional needs solely PO, given severity of oral dysphagia. Consider cont'd use of g-tube, as appropriate per MD/RD discretion.) (11/11/24 0905)  Recommended Precautions and Strategies: upright posture during/after eating, small bites of food and sips of liquid, alternate between small bites of food and sips of liquid, 1:1 supervision, assist with feeding (slow pace, liquids via tsp or small/single straw sips only) (11/11/24 0905)  SLP Rec. for Method of Medication Administration: meds crushed, with puree, meds via alternate route, as tolerated (11/11/24 0905)     Monitor for Signs of Aspiration: yes, notify SLP if any concerns (11/11/24 0905)     Swallow Criteria for Skilled Therapeutic Interventions Met: demonstrates skilled criteria (11/11/24 0905)  Anticipated Discharge Disposition (SLP): skilled nursing facility (11/11/24 0905)  Rehab Potential/Prognosis, Swallowing: re-evaluate goals as necessary (11/11/24 0905)  Therapy Frequency (Swallow): 5 days per week (11/11/24 0905)  Predicted Duration Therapy Intervention (Days): 1 week (11/11/24 0905)  Oral Care Recommendations: Oral Care BID/PRN, Suction toothbrush (11/11/24 0905)                                        Progress: improving      SWALLOW EVALUATION (Last 72 Hours)       SLP Adult Swallow Evaluation       Row Name 11/11/24 0905 11/10/24 1030 11/09/24 1550             Rehab Evaluation    Document Type evaluation  -AC re-evaluation  -DV re-evaluation  -      Subjective Information no complaints  -AC no complaints  -DV no complaints  -      Patient Observations alert;cooperative  -AC alert;cooperative  -DV cooperative;lethargic  -      Patient/Family/Caregiver Comments/Observations Idalia Spencer,  present.  - no family present  - No family present  -      Patient Effort good  - good  - adequate  -      Comment -- more participatory today  - --      Symptoms Noted During/After Treatment -- none  - none  -         General Information    Patient Profile Reviewed yes  - yes  -DV yes  -      Pertinent History Of Current Problem See previous eval.  - see initial eval  - See initial eval  -      Current Method of Nutrition NPO;gastrostomy feedings  - NPO;gastrostomy feedings  - NPO;gastrostomy feedings  -      Precautions/Limitations, Vision -- vision impairment, bilaterally  - vision impairment, bilaterally  -      Precautions/Limitations, Hearing -- WFL;for purposes of eval  - WFL;for purposes of eval  -      Prior Level of Function-Communication -- cognitive-linguistic impairment  - cognitive-linguistic impairment  -      Prior Level of Function-Swallowing -- thin liquids;puree;alternative feeding method;other (see comments)  per [facility] 10/29  - thin liquids;puree;alternative feeding method;other (see comments)  [facility] 10/29/2024: mod-severe oral impairment, mild pharyngeal w recs  -      Plans/Goals Discussed with patient and family;agreed upon  - patient;agreed upon  - patient  -      Barriers to Rehab previous functional deficit;cognitive status  - previous functional deficit;cognitive status  - previous functional deficit;cognitive status  -      Patient's Goals for Discharge patient did not state  - patient did not state  - patient did not state  -      Family Goals for Discharge family did not state  - -- --         Pain    Pretreatment Pain Rating 0/10 - no pain  - 0/10 - no pain  - --      Posttreatment Pain Rating 0/10 - no pain  - 0/10 - no pain  - --      Additional Documentation -- -- Pain Scale: FACES Pre/Post-Treatment (Group)  -         Pain Scale: FACES Pre/Post-Treatment    Pain: FACES Scale, Pretreatment -- -- 0-->no hurt   -      Posttreatment Pain Rating -- -- 0-->no hurt  -         Oral Motor Structure and Function    Oral Lesions or Structural Abnormalities and/or variants -- -- Open mouth posture  -      Dentition Assessment -- missing teeth;teeth are in poor condition  - missing teeth;teeth are in poor condition  -      Secretion Management -- WNL/WFL  -DV WNL/WFL  -      Mucosal Quality -- dry  -DV --      Volitional Swallow -- unable to elicit  -DV --      Volitional Cough -- unable to elicit  -DV --         Oral Musculature and Cranial Nerve Assessment    Oral Motor General Assessment -- unable to assess  -DV other (see comments)  Pt u/a to follow OM commands  -      Oral Motor, Comment -- pt u/a to follow directions  - --         General Eating/Swallowing Observations    Respiratory Support Currently in Use room air  - room air  -DV room air  -      Eating/Swallowing Skills fed by staff/caregiver;unable to perform self-feeding  - fed by SLP;unable to perform self-feeding  - fed by SLP;unable to perform self-feeding  -      Positioning During Eating upright in bed;contracted;other (see comments)  hyperextended head position  -AC upright 90 degree;upright in bed  - upright 90 degree;upright in bed  -      Utensils Used -- spoon;straw  - spoon;cup  -      Consistencies Trialed -- ice chips;thin liquids;nectar/syrup-thick liquids;pureed  - ice chips;thin liquids  -         Respiratory    Respiratory Status -- WFL  -DV --         Clinical Swallow Eval    Oral Prep Phase -- impaired  - impaired  -      Oral Transit -- impaired  - impaired  -      Oral Residue -- WFL  -DV --      Pharyngeal Phase -- suspected pharyngeal impairment  - suspected pharyngeal impairment  -      Clinical Swallow Evaluation Summary -- Pt much more alert and participatory today. Accepted all PO trials. Improved oral phase, continues with oral-holding only. (Did not test regular solids.) Pt exhibited cough  with thin liquid. No s/sx aspriation with nectar-thick liquid or puree. Pt appears appropriate for instrumental assessment now, recommend FEES today or tomorrow pending availability.  -DV Pt lethargic, cues required t/o eval for pt to remain awake. Anterior loss, oral holding & delayed initiation of bolus transit noted. Delayed cough w/ thin liquid trials. Pt declined NTL or pureed trials. Safest recommendation cont NPO, SLP will f/u for re-eval & monitor for readiness to participate in instrumental assessment `  -MH         Oral Prep Concerns    Oral Prep Concerns -- oral holding  -DV reduced lip opening;incomplete or weak lip closure around spoon;anterior loss;oral holding  -MH      Oral Holding -- all consistencies  -DV thin  -MH      Reduced Lip Opening -- -- thin  -MH      Incomplete or Weak Lip Closure Around Spoon -- -- thin  -MH      Anterior Loss -- -- thin  -MH         Oral Transit Concerns    Oral Transit Concerns -- delayed initiation of bolus transit  -DV delayed initiation of bolus transit  -MH      Delayed Intiation of Bolus Transit -- all consistencies  -DV thin  -MH         Pharyngeal Phase Concerns    Pharyngeal Phase Concerns -- cough  -DV cough  -MH      Cough -- thin  -DV thin  -MH         Fiberoptic Endoscopic Evaluation of Swallowing (FEES)    Risks/Benefits Reviewed risks/benefits explained;patient;agreed to eval  -AC -- --      Nasal Entry right:  -AC -- --      Scope serial number/identification [id number]  -AC -- --         Anatomy and Physiology    Anatomic Considerations no anatomic structural deviation  -AC -- --      Velopharyngeal CNA  -AC -- --      Base of Tongue range reduced  -AC -- --      Epiglottis WFL  -AC -- --      Laryngeal Function Breathing symmetrical  -AC -- --      Laryngeal Function Phonation symmetrical;other (see comments)  incomplete closure--posterior gap  -AC -- --      Laryngeal Function to Breath Hold CNA  -AC -- --      Secretion Rating Scale (Ryan et al. 1996) 0-  normal, no visible secretions  -AC -- --      Sensory sensed scope  -AC -- --      Utensils Used Spoon;Cup;Straw  -AC -- --      Consistencies Trialed thin liquids;nectar-thick liquids;honey-thick liquids;pudding/puree;soft to chew;chopped  -AC -- --         FEES Interpretation    Oral Phase prolonged manipulation;increased A-P transit time;oral residue present;prespill of liquids into pharynx;reduced lingual control  -AC -- --      Oral Phase, Comment Intermitttent premature spillage and oral residue spilling to pharynx/laryngeal vestibule w/ thin and nectar-thick liquids.  -AC -- --         Initiation of Pharyngeal Swallow    Initiation of Pharyngeal Swallow bolus in pyriform sinuses  -AC -- --      Pharyngeal Phase impaired pharyngeal phase of swallowing  -AC -- --      Penetration Before the Swallow thin liquids;nectar-thick liquids;secondary to reduced back of tongue control;secondary to delayed swallow initiation or mistiming  intermittent  -AC -- --      Aspiration During the Swallow thin liquids;nectar-thick liquids;secondary to delayed swallow initiation or mistiming;secondary to reduced laryngeal elevation;secondary to reduced vestibular closure;other (see comments)  intermittent  -AC -- --      Penetration After the Swallow thin liquids;nectar-thick liquids;secondary to residue;in valleculae;in pyriform sinuses  intermittent  -AC -- --      Depth of Penetration deep  -AC -- --      Response to Penetration No  -AC -- --      No spontaneous response to penetration and non-effective laryngeal clearance with cue (see comments)  cough  -AC -- --      Response to Aspiration No  -AC -- --      No spontaneous response to aspiration with non-effective subglottic clearance with cue (see comments)  cough  -AC -- --      Rosenbek's Scale thin:;nectar:;8-->Level 8  -AC -- --      Residue all consistencies tested;diffuse within pharynx;secondary to reduced base of tongue retraction;secondary to reduced posterior  pharyngeal wall stripping;secondary to reduced laryngeal elevation;secondary to reduced hyolaryngeal excursion;other (see comments)  mild-moderate, greatest on R side  - -- --      Response to Residue partial residue clearance;with spontaneous subsequent swallow  - -- --      Attempted Compensatory Maneuvers bolus size;bolus presentation style;additional subsequent swallow;other (see comments)  limited by cognitive status; pt u/a to follow commands for positional compensations  - -- --      Response to Attempted Compensatory Maneuvers did not prevent aspiration  - -- --         Swallowing Quality of Life Assessment    Education and counseling provided Silent aspiration;Risks of aspiration;Safest diet options;Feeding assistance and techniques;Compensatory strategy recommendations and rationale  - -- --         SLP Evaluation Clinical Impression    SLP Swallowing Diagnosis moderate;oral dysphagia;pharyngeal dysphagia  -AC oral dysphagia;suspected pharyngeal dysphagia  - oral dysphagia;suspected pharyngeal dysphagia  -      Functional Impact risk of aspiration/pneumonia;risk of malnutrition;risk of dehydration  - risk of aspiration/pneumonia;risk of malnutrition  - risk of aspiration/pneumonia;risk of malnutrition  -      Rehab Potential/Prognosis, Swallowing re-evaluate goals as necessary  - re-evaluate goals as necessary  - re-evaluate goals as necessary  -      Swallow Criteria for Skilled Therapeutic Interventions Met demonstrates skilled criteria  - demonstrates skilled criteria  - demonstrates skilled criteria  -         Recommendations    Therapy Frequency (Swallow) 5 days per week  - PRN;5 days per week  -DV --      Predicted Duration Therapy Intervention (Days) 1 week  - 1 week  -DV 1 week  -      SLP Diet Recommendation mechanical ground textures;no mixed consistencies;honey thick liquids;ice chips between meals after oral care, with supervision;water between meals after  oral care, with supervision;long term alternate methods of nutrition/hydration;other (see comments)  Single ice chips/sips of H2O via tsp ok between meals, as tolerated. Concern that may have difficulty meeting nutritional needs solely PO, given severity of oral dysphagia. Consider cont'd use of g-tube, as appropriate per MD/RD discretion.  - NPO;long term alternate methods of nutrition/hydration  -DV NPO;long term alternate methods of nutrition/hydration  -      Recommended Diagnostics -- FEES  today or tomorrow  - reassess via clinical swallow evaluation  -      Recommended Precautions and Strategies upright posture during/after eating;small bites of food and sips of liquid;alternate between small bites of food and sips of liquid;1:1 supervision;assist with feeding  slow pace, liquids via tsp or small/single straw sips only  - -- --      Oral Care Recommendations Oral Care BID/PRN;Suction toothbrush  - Oral Care BID/PRN;Suction toothbrush  - Oral Care BID/PRN;Suction toothbrush  -      SLP Rec. for Method of Medication Administration meds crushed;with puree;meds via alternate route;as tolerated  - meds via alternate route  - meds via alternate route  -      Monitor for Signs of Aspiration yes;notify SLP if any concerns  - yes;notify SLP if any concerns  - yes;notify SLP if any concerns  -      Anticipated Discharge Disposition (SLP) skilled nursing facility  -AC long term acute care facility  - long term acute care facility  -                User Key  (r) = Recorded By, (t) = Taken By, (c) = Cosigned By      Initials Name Effective Dates     Kimberlee Castillo MS CCC-SLP 02/03/23 -     Marilee Stevens MS CCC-SLP 06/16/21 -      Kathy Gonzales, MS CCC-SLP 05/12/23 -                     EDUCATION  The patient has been educated in the following areas:   Dysphagia (Swallowing Impairment) Modified Diet Instruction.        SLP GOALS       Row Name 11/11/24 0905             (LTG)  Patient will demonstrate functional swallow for    Diet Texture (Demonstrate functional swallow) mechanical ground textures  -AC      Liquid viscosity (Demonstrate functional swallow) thin liquids  -AC      [place] (Demonstrate functional swallow) with 1:1 assist/ supervision  -AC      Time Frame (Demonstrate functional swallow) 1 week  -AC         (STG) Patient will tolerate trials of    Consistencies Trialed (Tolerate trials) mechanical ground textures;honey/ moderately thick liquids  -AC      Desired Outcome (Tolerate trials) without signs/symptoms of aspiration;with adequate oral prep/transit/clearance  -AC      [place] (Tolerate trials) with 1:1 assist/ supervision  -AC      Time Frame (Tolerate trials) 1 week  -AC         (STG) Patient will tolerate therapeutic trials of    Consistencies Trialed (Tolerate therapeutic trials) thin liquids;nectar/ mildly thick liquids  -AC      Desired Outcome (Tolerate therapeutic trials) without signs/symptoms of aspiration;without signs of distress;with adequate oral prep/transit/clearance  silent aspiration per FEES--check for improved lingual control/residue/timing  -AC      [place] (Tolerate therapeutic trials) with 1:1 assist/ supervision  -AC      Time Frame (Tolerate therapeutic trials) 1 week  -AC         (STG) Lingual Strengthening Goal 1 (SLP)    Activity (Lingual Strengthening Goal 1, SLP) increase lingual tone/sensation/control/coordination/movement;increase tongue back strength  -AC      Increase Lingual Tone/Sensation/Control/Coordination/Movement lingual resistance exercises  -AC      Increase Tongue Back Strength lingual resistance exercises  -AC      [place]/Accuracy (Lingual Strengthening Goal 1, SLP) with maximum cues (25-49% accuracy)  -AC      Time Frame (Lingual Strengthening Goal 1, SLP) 1 week  -AC      Barriers (Lingual Strengthening Goal 1, SLP) Cognitive status--trial  -AC         (STG) Pharyngeal Strengthening Exercise Goal 1  (SLP)    Activity (Pharyngeal Strengthening Goal 1, SLP) increase timing;increase closure at entrance to airway/closure of airway at glottis  -AC      Increase Timing prepping - 3 second prep or suck swallow or 3-step swallow  -AC      Increase Closure at Entrance to Airway/Closure of Airway at Glottis supraglottic swallow  -AC      [place]/Accuracy (Pharyngeal Strengthening Goal 1, SLP) with maximum cues (25-49% accuracy)  -AC      Time Frame (Pharyngeal Strengthening Goal 1, SLP) 1 week  -AC      Barriers (Pharyngeal Strengthening Goal 1, SLP) Cognitive status--trial  -AC                User Key  (r) = Recorded By, (t) = Taken By, (c) = Cosigned By      Initials Name Provider Type    Kimberlee Pastor MS CCC-SLP Speech and Language Pathologist                         Time Calculation:    Time Calculation- SLP       Row Name 11/11/24 1049             Time Calculation- SLP    SLP Start Time 0905  -      SLP Received On 11/11/24  -AC         Untimed Charges    73729-RE Fiberoptic Endo Eval Swallow Minutes 95  -AC         Total Minutes    Untimed Charges Total Minutes 95  -AC       Total Minutes 95  -AC                User Key  (r) = Recorded By, (t) = Taken By, (c) = Cosigned By      Initials Name Provider Type     Kimberlee Castillo MS CCC-SLP Speech and Language Pathologist                    Therapy Charges for Today       Code Description Service Date Service Provider Modifiers Qty    [id number]  ST FIBEROPTIC ENDO EVAL SWALL 6 11/11/2024 Kimberlee Castillo MS CCC-SLP GN, KX 1                 Kimberlee Castillo MS CCC-SLP  11/11/2024

## 2024-11-12 ENCOUNTER — APPOINTMENT (OUTPATIENT)
Dept: GENERAL RADIOLOGY | Facility: HOSPITAL | Age: 69
End: 2024-11-12
Payer: MEDICARE

## 2024-11-12 ENCOUNTER — APPOINTMENT (OUTPATIENT)
Dept: NEUROLOGY | Facility: HOSPITAL | Age: 69
End: 2024-11-12
Payer: MEDICARE

## 2024-11-12 ENCOUNTER — APPOINTMENT (OUTPATIENT)
Dept: MRI IMAGING | Facility: HOSPITAL | Age: 69
End: 2024-11-12
Payer: MEDICARE

## 2024-11-12 LAB
ACTH PLAS-MCNC: 46.9 PG/ML (ref 7.2–63.3)
ALBUMIN SERPL-MCNC: 3.6 G/DL (ref 3.5–5.2)
ALP SERPL-CCNC: 85 U/L (ref 39–117)
ALT SERPL W P-5'-P-CCNC: 20 U/L (ref 1–41)
ANION GAP SERPL CALCULATED.3IONS-SCNC: 11 MMOL/L (ref 5–15)
AST SERPL-CCNC: 17 U/L (ref 1–40)
BACTERIA SPEC CULT: NORMAL
BACTERIA SPEC CULT: NORMAL
BILIRUB CONJ SERPL-MCNC: <0.2 MG/DL (ref 0–0.3)
BILIRUB INDIRECT SERPL-MCNC: NORMAL MG/DL
BILIRUB SERPL-MCNC: <0.2 MG/DL (ref 0–1.2)
BUN SERPL-MCNC: 29 MG/DL (ref 8–23)
BUN/CREAT SERPL: 27.4 (ref 7–25)
CALCIUM SPEC-SCNC: 9.7 MG/DL (ref 8.6–10.5)
CAMPYLOBACTER STL CULT: NORMAL
CHLORIDE SERPL-SCNC: 98 MMOL/L (ref 98–107)
CO2 SERPL-SCNC: 24 MMOL/L (ref 22–29)
CREAT SERPL-MCNC: 1.06 MG/DL (ref 0.76–1.27)
DEPRECATED RDW RBC AUTO: 47.9 FL (ref 37–54)
E COLI SXT STL QL IA: NEGATIVE
EGFRCR SERPLBLD CKD-EPI 2021: 76.9 ML/MIN/1.73
ERYTHROCYTE [DISTWIDTH] IN BLOOD BY AUTOMATED COUNT: 14.4 % (ref 12.3–15.4)
GLUCOSE BLDC GLUCOMTR-MCNC: 152 MG/DL (ref 70–130)
GLUCOSE SERPL-MCNC: 127 MG/DL (ref 65–99)
HCT VFR BLD AUTO: 24.9 % (ref 37.5–51)
HGB BLD-MCNC: 8.1 G/DL (ref 13–17.7)
MCH RBC QN AUTO: 29.9 PG (ref 26.6–33)
MCHC RBC AUTO-ENTMCNC: 32.5 G/DL (ref 31.5–35.7)
MCV RBC AUTO: 91.9 FL (ref 79–97)
PLATELET # BLD AUTO: 280 10*3/MM3 (ref 140–450)
PMV BLD AUTO: 10.4 FL (ref 6–12)
POTASSIUM SERPL-SCNC: 4.5 MMOL/L (ref 3.5–5.2)
PROCALCITONIN SERPL-MCNC: 0.78 NG/ML (ref 0–0.25)
PROT SERPL-MCNC: 7 G/DL (ref 6–8.5)
RBC # BLD AUTO: 2.71 10*6/MM3 (ref 4.14–5.8)
SALM + SHIG STL CULT: NORMAL
SODIUM SERPL-SCNC: 133 MMOL/L (ref 136–145)
WBC NRBC COR # BLD AUTO: 6.79 10*3/MM3 (ref 3.4–10.8)

## 2024-11-12 PROCEDURE — 99233 SBSQ HOSP IP/OBS HIGH 50: CPT | Performed by: INTERNAL MEDICINE

## 2024-11-12 PROCEDURE — 70551 MRI BRAIN STEM W/O DYE: CPT

## 2024-11-12 PROCEDURE — 80076 HEPATIC FUNCTION PANEL: CPT | Performed by: INTERNAL MEDICINE

## 2024-11-12 PROCEDURE — 95816 EEG AWAKE AND DROWSY: CPT

## 2024-11-12 PROCEDURE — 95816 EEG AWAKE AND DROWSY: CPT | Performed by: PSYCHIATRY & NEUROLOGY

## 2024-11-12 PROCEDURE — 82948 REAGENT STRIP/BLOOD GLUCOSE: CPT

## 2024-11-12 PROCEDURE — 84145 PROCALCITONIN (PCT): CPT | Performed by: INTERNAL MEDICINE

## 2024-11-12 PROCEDURE — 71045 X-RAY EXAM CHEST 1 VIEW: CPT

## 2024-11-12 PROCEDURE — 25010000002 ENOXAPARIN PER 10 MG: Performed by: INTERNAL MEDICINE

## 2024-11-12 PROCEDURE — 80048 BASIC METABOLIC PNL TOTAL CA: CPT | Performed by: INTERNAL MEDICINE

## 2024-11-12 PROCEDURE — 85027 COMPLETE CBC AUTOMATED: CPT | Performed by: INTERNAL MEDICINE

## 2024-11-12 RX ORDER — ENOXAPARIN SODIUM 100 MG/ML
40 INJECTION SUBCUTANEOUS NIGHTLY
Status: DISCONTINUED | OUTPATIENT
Start: 2024-11-12 | End: 2024-11-15 | Stop reason: HOSPADM

## 2024-11-12 RX ADMIN — VALPROIC ACID 150 MG: 250 SOLUTION ORAL at 06:06

## 2024-11-12 RX ADMIN — FLUOXETINE HYDROCHLORIDE 20 MG: 20 SOLUTION ORAL at 08:10

## 2024-11-12 RX ADMIN — ARIPIPRAZOLE 5 MG: 5 TABLET ORAL at 08:11

## 2024-11-12 RX ADMIN — AMLODIPINE BESYLATE 10 MG: 10 TABLET ORAL at 08:11

## 2024-11-12 RX ADMIN — LANSOPRAZOLE 30 MG: 15 TABLET, ORALLY DISINTEGRATING, DELAYED RELEASE ORAL at 06:05

## 2024-11-12 RX ADMIN — Medication 10 ML: at 20:35

## 2024-11-12 RX ADMIN — ENOXAPARIN SODIUM 40 MG: 100 INJECTION SUBCUTANEOUS at 20:34

## 2024-11-12 RX ADMIN — Medication 5 ML: at 18:28

## 2024-11-12 RX ADMIN — Medication 5 ML: at 20:35

## 2024-11-12 RX ADMIN — BRIMONIDINE TARTRATE 1 DROP: 2 SOLUTION/ DROPS OPHTHALMIC at 08:14

## 2024-11-12 RX ADMIN — BRIMONIDINE TARTRATE 1 DROP: 2 SOLUTION/ DROPS OPHTHALMIC at 15:43

## 2024-11-12 RX ADMIN — TIMOLOL MALEATE 1 DROP: 5 SOLUTION/ DROPS OPHTHALMIC at 08:14

## 2024-11-12 RX ADMIN — TERAZOSIN HYDROCHLORIDE 5 MG: 5 CAPSULE ORAL at 20:34

## 2024-11-12 RX ADMIN — BRIMONIDINE TARTRATE 1 DROP: 2 SOLUTION/ DROPS OPHTHALMIC at 20:34

## 2024-11-12 RX ADMIN — TERAZOSIN HYDROCHLORIDE 5 MG: 5 CAPSULE ORAL at 08:11

## 2024-11-12 RX ADMIN — SENNOSIDES AND DOCUSATE SODIUM 2 TABLET: 50; 8.6 TABLET ORAL at 08:10

## 2024-11-12 RX ADMIN — Medication 400 UNITS: at 08:09

## 2024-11-12 RX ADMIN — TIMOLOL MALEATE 1 DROP: 5 SOLUTION/ DROPS OPHTHALMIC at 20:34

## 2024-11-12 RX ADMIN — VALPROIC ACID 150 MG: 250 SOLUTION ORAL at 21:08

## 2024-11-12 RX ADMIN — SENNOSIDES AND DOCUSATE SODIUM 2 TABLET: 50; 8.6 TABLET ORAL at 20:33

## 2024-11-12 RX ADMIN — VALPROIC ACID 150 MG: 250 SOLUTION ORAL at 15:43

## 2024-11-12 NOTE — PROGRESS NOTES
[facility] Medicine Services  PROGRESS NOTE    Patient Name: Omkar Nava  : 1955  MRN: [id number]    Date of Admission: 2024  Primary Care Physician: Alberto Dye MD    Subjective   Subjective     CC: weakness f/u    HPI:  Patient still somnolent - mumbling speech, not very understandable.   Daughter bedside who reports this is a change from baseline where he is baseline conversational in wheelchair - joking and playful with family members    Objective   Objective     Vital Signs:   Temp:  [96.2 °F (35.7 °C)-100 °F (37.8 °C)] 98.9 °F (37.2 °C)  Heart Rate:  [62-89] 73  Resp:  [18-20] 18  BP: (112-161)/(47-77) 112/47     Physical Exam:  Constitutional: No acute distress, tired appearing lying in bed  Respiratory: Clear to auscultation bilaterally, respiratory effort normal   Cardiovascular: RRR, no murmurs, rubs, or gallops  Gastrointestinal: Soft, nontender, nondistended  Musculoskeletal: Muscle tone significantly decreased throughout, no joint effusions appreciated  Psychiatric: Flat affect  Neurologic: Very soft speech, can tell me his name but very softly. Can follow commands w repeated asking  Skin: No rashes    Results Reviewed:  LAB RESULTS:      Lab 24  1547 24  0837 24  1047 11/10/24  0632 24  0710 24  0752 24  1729 24  1618   WBC 6.79  --  5.60 6.20 5.53 4.76  --  5.81   HEMOGLOBIN 8.1*  --  8.5* 8.5* 8.5* 7.7*  --  7.9*   HEMATOCRIT 24.9*  --  25.6* 26.4* 26.6* 24.1*  --  25.1*   PLATELETS 280  --  316 308 306 287  --  283   NEUTROS ABS  --   --   --   --  3.36 2.94  --  3.48   IMMATURE GRANS (ABS)  --   --   --   --  0.01 0.01  --  0.02   LYMPHS ABS  --   --   --   --  1.48 1.22  --  1.62   MONOS ABS  --   --   --   --  0.53 0.49  --  0.55   EOS ABS  --   --   --   --  0.12 0.08  --  0.12   MCV 91.9  --  90.1 92.6 93.3 94.1  --  96.2   PROCALCITONIN  --  0.78*  --   --   --   --   --   --    HSTROP T  --   --   --    --   --   --  80* 84*         Lab 11/12/24  0837 11/11/24  1047 11/10/24  0632 11/09/24  1202 11/09/24  0711 11/07/24  1729 11/07/24  1618   SODIUM 133* 135* 134* 132* 135*   < > 133*   POTASSIUM 4.5 4.1 5.0 4.8 5.1   < > 6.3*   CHLORIDE 98 100 100 100 101   < > 98   CO2 24.0 25.0 24.0 25.0 24.0   < > 26.0   ANION GAP 11.0 10.0 10.0 7.0 10.0   < > 9.0   BUN 29* 25* 21 23 23   < > 42*   CREATININE 1.06 1.00 0.90 0.95 0.91   < > 1.39*   EGFR 76.9 82.5 93.6 87.7 92.4   < > 55.6*   GLUCOSE 127* 149* 119* 193* 160*   < > 104*   CALCIUM 9.7 9.5 9.1 9.4 9.7   < > 9.9   MAGNESIUM  --  1.8 1.6  --  1.7  --  2.2   PHOSPHORUS  --   --  3.2  --  3.0  --   --    TSH  --   --   --   --   --   --  4.100    < > = values in this interval not displayed.         Lab 11/12/24  0837 11/10/24  0632 11/07/24  1618   TOTAL PROTEIN 7.0 6.3 7.2   ALBUMIN 3.6 3.6 3.8   GLOBULIN  --  2.7 3.4   ALT (SGPT) 20 16 20   AST (SGOT) 17 14 14   BILIRUBIN <0.2 <0.2 <0.2   BILIRUBIN DIRECT <0.2  --   --    ALK PHOS 85 93 85         Lab 11/07/24  1729 11/07/24  1618   HSTROP T 80* 84*             Lab 11/07/24  2313 11/07/24  1729   IRON  --  57*  57*   IRON SATURATION (TSAT)  --  17*   TIBC  --  337   TRANSFERRIN  --  226   FERRITIN  --  443.70*   FOLATE 11.80  --    VITAMIN B 12 1,303*  --    ABO TYPING O  --    RH TYPING Positive  --    ANTIBODY SCREEN Negative  --          Brief Urine Lab Results  (Last result in the past 365 days)        Color   Clarity   Blood   Leuk Est   Nitrite   Protein   CREAT   Urine HCG        11/07/24 1806 Yellow   Clear   Negative   Negative   Negative   Negative                   Microbiology Results Abnormal       None            EEG    Result Date: 11/12/2024  Reason for referral: 69 y.o.male with altered mental status, history of previous seizure Technical Summary:  A 19 channel digital EEG was performed using the international 10-20 placement system, including eye leads and EKG leads. Duration: 21 minutes Findings:  The patient is awake and mumbling.  Diffuse medium amplitude 2-6 Hz intermixed delta and theta activity is seen symmetrically over both hemispheres.  There is an overlay of EMG artifact which obscures portions of the background.  Sleep is not clearly seen.  Photic stimulation does not change the background.  Hyperventilation is not performed.  No focal features or epileptiform activity are seen. Video: Available Technical quality: Good SUMMARY: Moderate generalized slow No focal features or epileptiform activity are seen     Impression: Diffuse cerebral dysfunction of moderate degree but nonspecific No ongoing seizures are seen This report is transcribed using the Dragon dictation system.  =    XR Chest 1 View    Result Date: 11/12/2024  XR CHEST 1 VW Date of Exam: 11/12/2024 1:04 PM EST Indication: borderline fever Comparison: Chest radiograph 11/7/2024. Findings: Cardiomediastinal silhouette is unchanged. No focal consolidation or overt pulmonary edema. No pleural effusion or pneumothorax. Osseous structures are unchanged.     Impression: Impression: No evidence of acute cardiopulmonary disease. Electronically Signed: Anjum Prado MD  11/12/2024 1:14 PM EST  Workstation ID: [id number]    SLP FEES - Fiberoptic Endo Eval Swallow    Result Date: 11/11/2024  This procedure was auto-finalized with no dictation required.     Results for orders placed during the hospital encounter of 01/15/24    Adult Transthoracic Echo Complete With Contrast if Necessary Per Protocol    Interpretation Summary    Left ventricular ejection fraction appears to be 56 - 60%.    Left ventricular wall thickness is consistent with hypertrophy.    Estimated right ventricular systolic pressure from tricuspid regurgitation is mildly elevated (35-45 mmHg).    There is a small (1-2cm) pericardial effusion.    No evidence for pericardial tamponade      Current medications:  Scheduled Meds:amLODIPine, 10 mg, Per G Tube, Q24H  [Held by provider]  ARIPiprazole, 5 mg, Per PEG Tube, Daily  brimonidine, 1 drop, Both Eyes, TID  cholecalciferol, 400 Units, Per PEG Tube, Daily  enoxaparin, 40 mg, Subcutaneous, Nightly  FLUoxetine, 20 mg, Per PEG Tube, Daily  lansoprazole, 30 mg, Per PEG Tube, Q AM  palliative care oral rinse, 5 mL, Mouth/Throat, 4x Daily  [Held by provider] QUEtiapine, 25 mg, Per PEG Tube, Nightly  senna-docusate sodium, 2 tablet, Per PEG Tube, BID  sodium chloride, 10 mL, Intravenous, Q12H  terazosin, 5 mg, Per PEG Tube, Q12H  timolol, 1 drop, Both Eyes, BID  Valproic Acid, 150 mg, Per PEG Tube, Q8H      Continuous Infusions:   PRN Meds:.  acetaminophen    senna-docusate sodium **AND** polyethylene glycol **AND** [DISCONTINUED] bisacodyl **AND** bisacodyl    dextrose    dextrose    glucagon (human recombinant)    hydrOXYzine    ipratropium-albuterol    nitroglycerin    sodium chloride    sodium chloride    sodium chloride    Assessment & Plan   Assessment & Plan     Active Hospital Problems    Diagnosis  POA    **JAYY (acute kidney injury) [N17.9]  Yes    Generalized weakness [R53.1]  Unknown    Hyperkalemia [E87.5]  Unknown    Severe vascular dementia without behavioral disturbance, psychotic disturbance, mood disturbance, or anxiety [F01.C0]  Yes    Dementia [F03.90]  Yes    Anemia of chronic disease [D63.8]  Yes    Essential hypertension [I10]  Yes    Type 2 diabetes mellitus [E11.9]  Yes      Resolved Hospital Problems   No resolved problems to display.        Brief Hospital Course to date:  Omkar Nava is a 69 y.o. male w vascular dementia, dysphagia s/p PEG tube who presented from long-term care with weakness and family concern. Hypothermic w temperature 93.2 on arrival which resolved by AM, no infectious source discovered and cortisol stim test wnl.  Remains with increased somnolence, decreased voice volume, less conversational than baseline. No appreciated focal deficits from baseline. CT head wnl, EEG with dysfunction but no seizure  activity. Also has relative hypotension in setting of chronic hypertension, borderline temps today.     Acute somnolence  Baseline vascular dementia c/b psychosis  -see work-up as above, unremarkable to now. Off baseline significantly per d/w daughter  -hold abilify/seroquel for now for possible sedation. Valproic acid levels still subtherapeutic so less concerned about overdose w this  -MRI head ordered, neurology consult in AM (seen by their service in past)    Hypothermia - resolved  -s/p complete infectious w/u which was unremarkable  -s/p cortisol stim which was appropriate  -currently normal temperature v borderline elevated on eval 11/12: repeat CXR also without PNA (at risk of aspiration), WBC# wnl, procal only mildly elevated which is nonspecific      Hyperkalemic on admit - resolved w lokelma on admit but K>6.0. unclear etiology and trending down    Relative hypotension in setting of chronic HTN   -home amlodipine and terazosin restarted but home clonidine etc on hold. Significant change from baseline needs in this respect  -cortisol stim wnl  -obtain repeat lmtedECHO to ensure no significant change to EF    Bradycardia - improved - hold home carvedilol, will likely need to d/c at discharge    Chronic anemia  Chronic aspiration - daytime modified diet, nocturnal TF  DMII, A1c 5.6 - hypoglycemic 2/2 insulin trx of hypokalemia on admit, now resolved  CHFpEF   Vascular dementia c/b psychosis - home valproic acid, fluoxetine. Abilify/seroquel on hold as above    Expected Discharge Location and Transportation: long-term care  Expected Discharge 11/14 (Discharge date is tentative pending patient's medical condition and is subject to change)  Expected Discharge Date: 11/14/2024; Expected Discharge Time:      VTE Prophylaxis:  Pharmacologic & mechanical VTE prophylaxis orders are present.         AM-PAC 6 Clicks Score (PT): 7 (11/12/24 0800)    CODE STATUS:   Code Status and Medical Interventions: CPR (Attempt to  Resuscitate); Full Support   Ordered at: 11/07/24 2023     Level Of Support Discussed With:    Patient     Code Status (Patient has no pulse and is not breathing):    CPR (Attempt to Resuscitate)     Medical Interventions (Patient has pulse or is breathing):    Full Support       Coral Valle MD  11/12/24    Total time spent: Time Spent: Time Spent: 60 minutes  Time spent includes time reviewing chart, face-to-face time, counseling patient/family/caregiver, ordering medications/tests/procedures, communicating with other health care professionals, documenting clinical information in the electronic health record, and coordination of care.

## 2024-11-12 NOTE — PLAN OF CARE
Problem: Adult Inpatient Plan of Care  Goal: Absence of Hospital-Acquired Illness or Injury  Intervention: Identify and Manage Fall Risk  Recent Flowsheet Documentation  Taken 11/12/2024 1800 by Jeanne Kelly RN  Safety Promotion/Fall Prevention:   activity supervised   assistive device/personal items within reach   clutter free environment maintained   lighting adjusted   room organization consistent   safety round/check completed  Taken 11/12/2024 1600 by Jeanne Kelly RN  Safety Promotion/Fall Prevention:   activity supervised   assistive device/personal items within reach   clutter free environment maintained   lighting adjusted   room organization consistent   safety round/check completed  Taken 11/12/2024 1200 by Jeanne Kelly RN  Safety Promotion/Fall Prevention:   activity supervised   clutter free environment maintained   assistive device/personal items within reach   lighting adjusted   room organization consistent   safety round/check completed  Taken 11/12/2024 1000 by Jeanne Kelly RN  Safety Promotion/Fall Prevention:   activity supervised   assistive device/personal items within reach   clutter free environment maintained   lighting adjusted   room organization consistent   safety round/check completed  Taken 11/12/2024 0800 by Jeanne Kelly RN  Safety Promotion/Fall Prevention:   activity supervised   assistive device/personal items within reach   clutter free environment maintained   lighting adjusted   room organization consistent   safety round/check completed     Problem: Adult Inpatient Plan of Care  Goal: Absence of Hospital-Acquired Illness or Injury  Intervention: Prevent Skin Injury  Recent Flowsheet Documentation  Taken 11/12/2024 1800 by Jeanne Kelly RN  Body Position: supine  Skin Protection: incontinence pads utilized  Taken 11/12/2024 1600 by Jeanne Kelly RN  Body Position: right  Skin Protection: incontinence pads utilized  Taken 11/12/2024 1400 by  Jeanne Kelly RN  Skin Protection: incontinence pads utilized  Taken 11/12/2024 1200 by Jeanne Kelly RN  Body Position: supine  Taken 11/12/2024 1100 by Jeanne Kelly RN  Body Position:   turned   supine  Taken 11/12/2024 1000 by Jeanne Kelly RN  Body Position: right  Skin Protection: incontinence pads utilized  Taken 11/12/2024 0800 by Jeanne Kelly RN  Body Position: left  Skin Protection: incontinence pads utilized     Problem: Adult Inpatient Plan of Care  Goal: Absence of Hospital-Acquired Illness or Injury  Intervention: Prevent and Manage VTE (Venous Thromboembolism) Risk  Recent Flowsheet Documentation  Taken 11/12/2024 0800 by Jeanne Kelly RN  VTE Prevention/Management: SCDs (sequential compression devices) on   Goal Outcome Evaluation:

## 2024-11-12 NOTE — PROGRESS NOTES
Clinical Nutrition     Patient Name: Omkar Nava  YOB: 1955  MRN: [id number]  Date of Encounter: 11/12/24 12:25 EST  Admission date: 11/7/2024  Reason for Visit: Follow-up protocol    Assessment   Nutrition Assessment   Admission Diagnosis:  JAYY (acute kidney injury) [N17.9]    Problem List:    JAYY (acute kidney injury)    Type 2 diabetes mellitus    Essential hypertension    Anemia of chronic disease    Dementia    Severe vascular dementia without behavioral disturbance, psychotic disturbance, mood disturbance, or anxiety    Generalized weakness    Hyperkalemia    PMH:   He  has a past medical history of Anemia, Dementia, Diabetes mellitus, Dysphagia, GERD (gastroesophageal reflux disease), History of alcohol abuse, History of cocaine use, History of marijuana use, Hypertension, Osteomyelitis, Poor historian, and Visual impairment.    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    Applicable Nutrition History:   CKD 2  T2DM, insulin use  HFpEF  Seizure disorder  Vascular dementia w/ agitation  Dysphagia s/p PEG (4/2024)    (11/08) SLP: NPO, long term alternate methods of nutrition/hydration   (11/10) SLP: NPO, long term alternate methods of nutrition/hydration   (11/11) SLP FEES: mechanical ground textures, no mixed consistencies, honey thick liquids, ice chips between meals after oral care, with supervision, water between meals after oral care, with supervision, long term alternate methods of nutrition/hydration, other (see comments) (Single ice chips/sips of H2O via tsp ok between meals, as tolerated. Concern that may have difficulty meeting nutritional needs solely PO, given severity of oral dysphagia. Consider cont'd use of g-tube, as appropriate per MD/RD discretion.)     Labs    Labs  Reviewed: Yes    Results from last 7 days   Lab Units 11/12/24  0837 11/11/24  1047 11/10/24  0632 11/09/24  1202 11/09/24  0711 11/07/24  1729 11/07/24  1618   GLUCOSE mg/dL 127* 149* 119*   < > 160*   < > 104*   BUN mg/dL 29* 25* 21   < > 23   < > 42*   CREATININE mg/dL 1.06 1.00 0.90   < > 0.91   < > 1.39*   SODIUM mmol/L 133* 135* 134*   < > 135*   < > 133*   CHLORIDE mmol/L 98 100 100   < > 101   < > 98   POTASSIUM mmol/L 4.5 4.1 5.0   < > 5.1   < > 6.3*   PHOSPHORUS mg/dL  --   --  3.2  --  3.0  --   --    MAGNESIUM mg/dL  --  1.8 1.6  --  1.7  --  2.2   ALT (SGPT) U/L  --   --  16  --   --   --  20    < > = values in this interval not displayed.       Results from last 7 days   Lab Units 11/10/24  0632 11/07/24  1618   ALBUMIN g/dL 3.6 3.8       Results from last 7 days   Lab Units 11/12/24  0623 11/11/24  1819 11/11/24  1136 11/11/24  0615 11/11/24  0116 11/10/24  1745   GLUCOSE mg/dL 152* 140* 150* 138* 204* 96     Lab Results   Lab Value Date/Time    HGBA1C 5.60 09/15/2024 0658    HGBA1C 7.00 (H) 10/16/2022 0432    HGBA1C 8.7 (H) 06/25/2022 0242    HGBA1C 13.4 04/14/2022 1058       Medications    Medications Reviewed: yes    Scheduled Meds:amLODIPine, 10 mg, Per G Tube, Q24H  ARIPiprazole, 5 mg, Per PEG Tube, Daily  brimonidine, 1 drop, Both Eyes, TID  cholecalciferol, 400 Units, Per PEG Tube, Daily  enoxaparin, 40 mg, Subcutaneous, Nightly  FLUoxetine, 20 mg, Per PEG Tube, Daily  lansoprazole, 30 mg, Per PEG Tube, Q AM  palliative care oral rinse, 5 mL, Mouth/Throat, 4x Daily  QUEtiapine, 25 mg, Per PEG Tube, Nightly  senna-docusate sodium, 2 tablet, Per PEG Tube, BID  sodium chloride, 10 mL, Intravenous, Q12H  terazosin, 5 mg, Per PEG Tube, Q12H  timolol, 1 drop, Both Eyes, BID  Valproic Acid, 150 mg, Per PEG Tube, Q8H      Continuous Infusions:   PRN Meds:.  acetaminophen    senna-docusate sodium **AND** polyethylene glycol **AND** [DISCONTINUED] bisacodyl **AND** bisacodyl    dextrose    dextrose     "glucagon (human recombinant)    hydrOXYzine    ipratropium-albuterol    nitroglycerin    sodium chloride    sodium chloride    sodium chloride    Intake/Ouptut 24 hrs (0701 - 0700)   I&O's Reviewed: yes  Intake & Output (last day)         11/11 0701 11/12 0700 11/12 0701 11/13 0700    Other 232     NG/     Total Intake(mL/kg) 662 (8)     Urine (mL/kg/hr) 550 (0.3)     Stool      Total Output 550     Net +112           Urine Unmeasured Occurrence 3 x           Anthropometrics     Height: Height: 172.7 cm (68\")  Last Filed Weight: Weight: 82.6 kg (182 lb) (11/07/24 2234)  Method: Weight Method: Stated  BMI: BMI (Calculated): 27.7    UBW: 4/1/224 200#  Weight change: No significant changes    Nutrition Focused Physical Exam     Date: 11/08/24     Unable to perform due to Defer pending indication     Subjective   Reported/Observed/Food/Nutrition Related History:   11/12/24  Na+ continues to be slightly decreased. Patient wouldn't speak to this writer at time of visit. Spoke with assigned RN who reported patient has been tolerating nocturnal EN well. Patient unlikely to consume adequate nutrition via PO. Will continue nocturnal feeds.    11/11/24  K+ WNL, Na+ slightly decreased. Will adjust FWF.    **Addendum: Consult received to modify EN as patient has passed FEES today. Will modify EN regimen to allow patient to eat during the day. Nursing tech at bedside feeding patient lunch at time of visit. Stated he has been eating it well and seemed to have a good appetite.    11/08/24  Patient is a resident at [facility]. He does have a PO diet there and EN regimen. Per facility, patient's EN regimen is Glucerna @ 55ml/hr + 85ml/hr FWF. Noted patient normally does not eat via PO very well. Reported a decrease appetite lately. Consult received for EN regimen while in house. Will continue previous regimen from recent admission (see recs below).    **Addendum: Per assigned RN, hospice?? asking for lower K+ formula. Will " "modify to Novasource Renal.     Needs Assessment   Date: 24     Height used:Height: 172.7 cm (68\")  Weights used: 77kg     Estimated Calorie needs: ~1900 Kcal/day  Method:  25-30 Kcals/KG = 1356-8560  Method:  1-1.2 x MSJ = 5477-3619    Estimated Protein needs: ~90 g PRO/day  Method: 1-1.2 g/Kg = 77-92g pro    Estimated Fluid needs: ~ ml/day   Per clinical status    Current Nutrition Prescription   PO: Diet: Regular/House; No Mixed Consistencies, Feeding Assistance - Nursing; Texture: Mechanical Ground (NDD 2); Fluid Consistency: Honey Thick  Oral Nutrition Supplement: n/a  Intake: : B 25% PO intake documented    EN: NovaSource Renal  Goal Rate: 43  Water Flushes: 20  Modular: None  Route: PEG  Tube: 20 PEG    EN @ goal over 12hr to supply:  Goal Volume 516 mL/day     Flush Volume 240 mL/day     Energy 1035 Kcal/day 54 % Est Need   Protein 48 g/day 53 % Est Need   Fiber 0 g/day     Water in   mL     Total Water 975 mL     Meet DRI N      --------------------------------------------------------------------------  Product/Rate verified at bedside: N/A  Infusing Rate at time of visit: TF held for nocturnal orders    Average Delivery from [facility] Day:   Volume 430 mL/day   % Goal Vol.   Flush Volume 232 mL/day     Energy  Kcal/day  % Est Need   Protein  g/day  % Est Need   Fiber  g/day     Water in  EN  mL     Total Water  mL     Meet DRI         Assessment & Plan   Nutrition Diagnosis   Date: 24  Updated:   Problem Swallowing difficulty    Etiology Oropharyngeal dysphagia   Signs/Symptoms PEG in place   Status: Active    Goal:   Nutrition to support treatment, Improve nutrition related labs, Establish PO, Tolerate PO, and Adjust EN    Nutrition Intervention      Follow treatment progress, Care plan reviewed, Encourage intake, Nutrition support order placed    Nutrition POC  Patient unlikely to consume adequate nutrition via PO. Recommend continuing nocturnal, cyclic feeds: Novasource Renal @ " 43ml/hr + 20ml/hr FWF x12hr  Will adjust EN/FWF regimen PRN  Recommend monitoring and replacing lytes PRN  Encourage adequate nutrition as able    Monitoring/Evaluation:   Per protocol, I&O, PO intake, Pertinent labs, EN delivery/tolerance, Weight, GI status, Symptoms, POC/GOC, Swallow function    Marija Yo MS,RD,LD  Time Spent: 30min

## 2024-11-13 ENCOUNTER — APPOINTMENT (OUTPATIENT)
Dept: CARDIOLOGY | Facility: HOSPITAL | Age: 69
End: 2024-11-13
Payer: MEDICARE

## 2024-11-13 ENCOUNTER — APPOINTMENT (OUTPATIENT)
Dept: GENERAL RADIOLOGY | Facility: HOSPITAL | Age: 69
End: 2024-11-13
Payer: MEDICARE

## 2024-11-13 LAB
ALBUMIN SERPL-MCNC: 3.6 G/DL (ref 3.5–5.2)
ALBUMIN/GLOB SERPL: 1.1 G/DL
ALP SERPL-CCNC: 89 U/L (ref 39–117)
ALT SERPL W P-5'-P-CCNC: 44 U/L (ref 1–41)
ANION GAP SERPL CALCULATED.3IONS-SCNC: 12 MMOL/L (ref 5–15)
APPEARANCE CSF: CLEAR
APPEARANCE CSF: CLEAR
ASCENDING AORTA: 2.9 CM
AST SERPL-CCNC: 41 U/L (ref 1–40)
BH CV ECHO MEAS - AO MAX PG: 3.2 MMHG
BH CV ECHO MEAS - AO MEAN PG: 2 MMHG
BH CV ECHO MEAS - AO ROOT DIAM: 3.5 CM
BH CV ECHO MEAS - AO V2 MAX: 89.3 CM/SEC
BH CV ECHO MEAS - AO V2 VTI: 17.9 CM
BH CV ECHO MEAS - AVA(I,D): 3.2 CM2
BH CV ECHO MEAS - EF(MOD-BP): 57.5 %
BH CV ECHO MEAS - EF(MOD-SP2): 60.4 %
BH CV ECHO MEAS - EF(MOD-SP4): 55.8 %
BH CV ECHO MEAS - IVS/LVPW: 0.63 CM
BH CV ECHO MEAS - IVSD: 1 CM
BH CV ECHO MEAS - LA DIMENSION: 3.2 CM
BH CV ECHO MEAS - LV MAX PG: 3.2 MMHG
BH CV ECHO MEAS - LV MEAN PG: 1.3 MMHG
BH CV ECHO MEAS - LV V1 MAX: 90.1 CM/SEC
BH CV ECHO MEAS - LV V1 VTI: 17.1 CM
BH CV ECHO MEAS - LVIDD: 4 CM
BH CV ECHO MEAS - LVIDS: 2.6 CM
BH CV ECHO MEAS - LVOT AREA: 3.1 CM2
BH CV ECHO MEAS - LVOT DIAM: 2 CM
BH CV ECHO MEAS - LVPWD: 1.6 CM
BH CV VAS BP RIGHT ARM: NORMAL MMHG
BILIRUB SERPL-MCNC: <0.2 MG/DL (ref 0–1.2)
BUN SERPL-MCNC: 39 MG/DL (ref 8–23)
BUN/CREAT SERPL: 28.1 (ref 7–25)
C GATTII+NEOFOR DNA CSF QL NAA+NON-PROBE: NOT DETECTED
CALCIUM SPEC-SCNC: 9.2 MG/DL (ref 8.6–10.5)
CHLORIDE SERPL-SCNC: 100 MMOL/L (ref 98–107)
CMV DNA CSF QL NAA+PROBE: NOT DETECTED
CO2 SERPL-SCNC: 25 MMOL/L (ref 22–29)
COLOR CSF: COLORLESS
COLOR CSF: COLORLESS
COLOR SPUN CSF: COLORLESS
COLOR SPUN CSF: COLORLESS
CREAT SERPL-MCNC: 1.39 MG/DL (ref 0.76–1.27)
DEPRECATED RDW RBC AUTO: 48 FL (ref 37–54)
E COLI K1 DNA CSF QL NAA+NON-PROBE: NOT DETECTED
EGFRCR SERPLBLD CKD-EPI 2021: 55.6 ML/MIN/1.73
ERYTHROCYTE [DISTWIDTH] IN BLOOD BY AUTOMATED COUNT: 14.4 % (ref 12.3–15.4)
EV RNA CSF QL NAA+PROBE: NOT DETECTED
GLOBULIN UR ELPH-MCNC: 3.2 GM/DL
GLUCOSE BLDC GLUCOMTR-MCNC: 143 MG/DL (ref 70–130)
GLUCOSE BLDC GLUCOMTR-MCNC: 155 MG/DL (ref 70–130)
GLUCOSE BLDC GLUCOMTR-MCNC: 209 MG/DL (ref 70–130)
GLUCOSE CSF-MCNC: 65 MG/DL (ref 40–70)
GLUCOSE SERPL-MCNC: 112 MG/DL (ref 65–99)
GP B STREP DNA SPEC QL NAA+PROBE: NOT DETECTED
HAEM INFLU SEROTYP DNA SPEC NAA+PROBE: NOT DETECTED
HCT VFR BLD AUTO: 25 % (ref 37.5–51)
HGB BLD-MCNC: 8.3 G/DL (ref 13–17.7)
HHV6 DNA CSF QL NAA+PROBE: NOT DETECTED
HOLD SPECIMEN: NORMAL
HSV1 DNA CSF QL NAA+PROBE: NOT DETECTED
HSV2 DNA CSF QL NAA+PROBE: NOT DETECTED
L MONOCYTOG RRNA SPEC QL PROBE: NOT DETECTED
LEFT ATRIUM VOLUME INDEX: 23.5 ML/M2
MCH RBC QN AUTO: 30.7 PG (ref 26.6–33)
MCHC RBC AUTO-ENTMCNC: 33.2 G/DL (ref 31.5–35.7)
MCV RBC AUTO: 92.6 FL (ref 79–97)
N MEN DNA SPEC QL NAA+PROBE: NOT DETECTED
PARECHOVIRUS A RNA CSF QL NAA+NON-PROBE: NOT DETECTED
PLATELET # BLD AUTO: 280 10*3/MM3 (ref 140–450)
PMV BLD AUTO: 10.5 FL (ref 6–12)
POTASSIUM SERPL-SCNC: 4.4 MMOL/L (ref 3.5–5.2)
PROT CSF-MCNC: 158 MG/DL (ref 15–45)
PROT SERPL-MCNC: 6.8 G/DL (ref 6–8.5)
RBC # BLD AUTO: 2.7 10*6/MM3 (ref 4.14–5.8)
RBC # CSF MANUAL: 1 /MM3 (ref 0–5)
RBC # CSF MANUAL: 15 /MM3 (ref 0–5)
S PNEUM DNA CSF QL NAA+NON-PROBE: NOT DETECTED
SODIUM SERPL-SCNC: 137 MMOL/L (ref 136–145)
SPECIMEN VOL CSF: 8 ML
SPECIMEN VOL CSF: 8 ML
TUBE # CSF: 1
TUBE # CSF: 4
VZV DNA CSF QL NAA+PROBE: NOT DETECTED
WBC # CSF MANUAL: 1 /MM3 (ref 0–5)
WBC # CSF MANUAL: 1 /MM3 (ref 0–5)
WBC NRBC COR # BLD AUTO: 4.69 10*3/MM3 (ref 3.4–10.8)

## 2024-11-13 PROCEDURE — 93321 DOPPLER ECHO F-UP/LMTD STD: CPT | Performed by: INTERNAL MEDICINE

## 2024-11-13 PROCEDURE — 93321 DOPPLER ECHO F-UP/LMTD STD: CPT

## 2024-11-13 PROCEDURE — 80053 COMPREHEN METABOLIC PANEL: CPT | Performed by: INTERNAL MEDICINE

## 2024-11-13 PROCEDURE — 86617 LYME DISEASE ANTIBODY: CPT

## 2024-11-13 PROCEDURE — 009U3ZX DRAINAGE OF SPINAL CANAL, PERCUTANEOUS APPROACH, DIAGNOSTIC: ICD-10-PCS | Performed by: STUDENT IN AN ORGANIZED HEALTH CARE EDUCATION/TRAINING PROGRAM

## 2024-11-13 PROCEDURE — 25010000002 LIDOCAINE PF 1% 1 % SOLUTION: Performed by: PHYSICIAN ASSISTANT

## 2024-11-13 PROCEDURE — 89050 BODY FLUID CELL COUNT: CPT

## 2024-11-13 PROCEDURE — 84157 ASSAY OF PROTEIN OTHER: CPT

## 2024-11-13 PROCEDURE — 93325 DOPPLER ECHO COLOR FLOW MAPG: CPT

## 2024-11-13 PROCEDURE — 86592 SYPHILIS TEST NON-TREP QUAL: CPT

## 2024-11-13 PROCEDURE — 93325 DOPPLER ECHO COLOR FLOW MAPG: CPT | Performed by: INTERNAL MEDICINE

## 2024-11-13 PROCEDURE — 87483 CNS DNA AMP PROBE TYPE 12-25: CPT

## 2024-11-13 PROCEDURE — 82945 GLUCOSE OTHER FLUID: CPT

## 2024-11-13 PROCEDURE — 87205 SMEAR GRAM STAIN: CPT

## 2024-11-13 PROCEDURE — 87327 CRYPTOCOCCUS NEOFORM AG IA: CPT

## 2024-11-13 PROCEDURE — 87116 MYCOBACTERIA CULTURE: CPT

## 2024-11-13 PROCEDURE — 93308 TTE F-UP OR LMTD: CPT | Performed by: INTERNAL MEDICINE

## 2024-11-13 PROCEDURE — 97535 SELF CARE MNGMENT TRAINING: CPT

## 2024-11-13 PROCEDURE — 87206 SMEAR FLUORESCENT/ACID STAI: CPT

## 2024-11-13 PROCEDURE — 87015 SPECIMEN INFECT AGNT CONCNTJ: CPT

## 2024-11-13 PROCEDURE — 82948 REAGENT STRIP/BLOOD GLUCOSE: CPT

## 2024-11-13 PROCEDURE — 97530 THERAPEUTIC ACTIVITIES: CPT

## 2024-11-13 PROCEDURE — 99222 1ST HOSP IP/OBS MODERATE 55: CPT

## 2024-11-13 PROCEDURE — 87070 CULTURE OTHR SPECIMN AEROBIC: CPT

## 2024-11-13 PROCEDURE — 25010000002 ENOXAPARIN PER 10 MG: Performed by: INTERNAL MEDICINE

## 2024-11-13 PROCEDURE — 86788 WEST NILE VIRUS AB IGM: CPT

## 2024-11-13 PROCEDURE — 99232 SBSQ HOSP IP/OBS MODERATE 35: CPT | Performed by: INTERNAL MEDICINE

## 2024-11-13 PROCEDURE — 86789 WEST NILE VIRUS ANTIBODY: CPT

## 2024-11-13 PROCEDURE — 93308 TTE F-UP OR LMTD: CPT

## 2024-11-13 PROCEDURE — 85027 COMPLETE CBC AUTOMATED: CPT | Performed by: INTERNAL MEDICINE

## 2024-11-13 RX ORDER — LIDOCAINE HYDROCHLORIDE 10 MG/ML
5 INJECTION, SOLUTION EPIDURAL; INFILTRATION; INTRACAUDAL; PERINEURAL ONCE
Status: COMPLETED | OUTPATIENT
Start: 2024-11-13 | End: 2024-11-13

## 2024-11-13 RX ADMIN — SENNOSIDES AND DOCUSATE SODIUM 2 TABLET: 50; 8.6 TABLET ORAL at 08:36

## 2024-11-13 RX ADMIN — TERAZOSIN HYDROCHLORIDE 5 MG: 5 CAPSULE ORAL at 08:36

## 2024-11-13 RX ADMIN — AMLODIPINE BESYLATE 10 MG: 10 TABLET ORAL at 08:36

## 2024-11-13 RX ADMIN — LIDOCAINE HYDROCHLORIDE 5 ML: 10 INJECTION, SOLUTION EPIDURAL; INFILTRATION; INTRACAUDAL; PERINEURAL at 14:24

## 2024-11-13 RX ADMIN — SENNOSIDES AND DOCUSATE SODIUM 2 TABLET: 50; 8.6 TABLET ORAL at 22:47

## 2024-11-13 RX ADMIN — Medication 5 ML: at 08:37

## 2024-11-13 RX ADMIN — FLUOXETINE HYDROCHLORIDE 20 MG: 20 SOLUTION ORAL at 08:36

## 2024-11-13 RX ADMIN — ENOXAPARIN SODIUM 40 MG: 100 INJECTION SUBCUTANEOUS at 22:48

## 2024-11-13 RX ADMIN — Medication 5 ML: at 16:51

## 2024-11-13 RX ADMIN — BRIMONIDINE TARTRATE 1 DROP: 2 SOLUTION/ DROPS OPHTHALMIC at 22:47

## 2024-11-13 RX ADMIN — TIMOLOL MALEATE 1 DROP: 5 SOLUTION/ DROPS OPHTHALMIC at 22:48

## 2024-11-13 RX ADMIN — VALPROIC ACID 150 MG: 250 SOLUTION ORAL at 22:46

## 2024-11-13 RX ADMIN — Medication 10 ML: at 22:49

## 2024-11-13 RX ADMIN — Medication 400 UNITS: at 08:36

## 2024-11-13 RX ADMIN — LANSOPRAZOLE 30 MG: 15 TABLET, ORALLY DISINTEGRATING, DELAYED RELEASE ORAL at 05:40

## 2024-11-13 RX ADMIN — TERAZOSIN HYDROCHLORIDE 5 MG: 5 CAPSULE ORAL at 22:47

## 2024-11-13 RX ADMIN — Medication 5 ML: at 17:21

## 2024-11-13 RX ADMIN — Medication 5 ML: at 22:49

## 2024-11-13 RX ADMIN — VALPROIC ACID 150 MG: 250 SOLUTION ORAL at 05:40

## 2024-11-13 RX ADMIN — Medication 5 ML: at 11:40

## 2024-11-13 RX ADMIN — Medication 10 ML: at 08:37

## 2024-11-13 RX ADMIN — BRIMONIDINE TARTRATE 1 DROP: 2 SOLUTION/ DROPS OPHTHALMIC at 09:54

## 2024-11-13 RX ADMIN — TIMOLOL MALEATE 1 DROP: 5 SOLUTION/ DROPS OPHTHALMIC at 09:54

## 2024-11-13 RX ADMIN — BRIMONIDINE TARTRATE 1 DROP: 2 SOLUTION/ DROPS OPHTHALMIC at 17:18

## 2024-11-13 RX ADMIN — VALPROIC ACID 150 MG: 250 SOLUTION ORAL at 14:47

## 2024-11-13 NOTE — PLAN OF CARE
Goal Outcome Evaluation:  Plan of Care Reviewed With: patient        Progress: no change  Outcome Evaluation: Pt continues to be below his functional baseline w/ mobility and transfers. Pt requiring significant assist for bed mobility, LB dressing and SPT transfer. Pt limited by dizziness. Continue to progress as able per current POC. Recommend SNF at d/c for best functional outcome.    Anticipated Discharge Disposition (OT): skilled nursing facility

## 2024-11-13 NOTE — THERAPY TREATMENT NOTE
Patient Name: Omkar Nava  : 1955    MRN: [id number]                              Today's Date: 2024       Admit Date: 2024    Visit Dx:     ICD-10-CM ICD-9-CM   1. Acute kidney injury  N17.9 584.9   2. Hyperkalemia  E87.5 276.7   3. Generalized weakness  R53.1 780.79   4. History of diabetes mellitus  Z86.39 V12.29   5. Vascular dementia, unspecified dementia severity, unspecified whether behavioral, psychotic, or mood disturbance or anxiety  F01.50 290.40   6. 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, without long-term current use of insulin    Acute osteomyelitis of left foot    Type 2 diabetes mellitus    Essential hypertension    Psychotic disorder    Neurocognitive disorder    S/P transmetatarsal amputation of foot, left    Abscess of left foot    Anemia of chronic disease    Aspiration pneumonia    Cervical spine pain    Chronic kidney disease    Closed head injury without loss of consciousness    Dementia    Dental cavities    Diarrhea of presumed infectious origin    Displaced fracture of proximal phalanx of left great toe, initial encounter for open fracture    Dysphagia    Erectile dysfunction    Generalized muscle weakness    Hallucinations    Hypertensive heart and chronic kidney disease 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 failure, with stage 1 through stage 4 chronic kidney disease, or unspecified chronic kidney disease    Insomnia due to medical condition    Iron deficiency anemia    Laceration without foreign body, left foot, subsequent encounter    Personal history of Methicillin resistant Staphylococcus aureus infection    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    Protein calorie malnutrition    Simple chronic bronchitis    Tobacco use    Type 2 diabetes mellitus with diabetic neuropathy, unspecified    Type 2 diabetes mellitus with  diabetic chronic kidney disease    Acute type 1 respiratory failure    Severe vascular dementia without behavioral disturbance, psychotic disturbance, mood disturbance, or anxiety    JAYY (acute kidney injury)    Generalized weakness    Hyperkalemia     Past Medical History:   Diagnosis Date    Anemia     Dementia     Diabetes mellitus     Dysphagia     GERD (gastroesophageal reflux disease)     History of alcohol abuse     History of cocaine use     History of marijuana use     Hypertension     Osteomyelitis     Poor historian     records obtained from nursing home records & his family    Visual impairment      Past Surgical History:   Procedure Laterality Date    AMPUTATION FOOT Left 10/18/2022    Procedure: PARTIAL FIRST RAY AMPUTATION LEFT;  Surgeon: Yeison Petty MD;  Location:  [facility];  Service: Orthopedics;  Laterality: Left;    AMPUTATION FOOT Left 12/5/2022    Procedure: Transmetatarsal of Left Foot;  Surgeon: Yeison Petty MD;  Location:  [facility];  Service: Orthopedics;  Laterality: Left;    ENDOSCOPY N/A 3/14/2024    Procedure: ESOPHAGOGASTRODUODENOSCOPY WITH JEJUNAL TUBE INSERTION AT BEDSIDE;  Surgeon: Brunner, Mark I, MD;  Location:  [facility];  Service: Gastroenterology;  Laterality: N/A;    ENDOSCOPY W/ PEG TUBE PLACEMENT N/A 4/1/2024    Procedure: ESOPHAGOGASTRODUODENOSCOPY WITH PERCUTANEOUS ENDOSCOPIC GASTROSTOMY TUBE INSERTION;  Surgeon: Brunner, Mark I, MD;  Location:  [facility];  Service: Gastroenterology;  Laterality: N/A;  EGD with PEG placement.  Secured at 3.5 cm    EYE SURGERY        General Information       Row Name 11/13/24 1139          Physical Therapy Time and Intention    Document Type therapy note (daily note)  -KR     Mode of Treatment physical therapy  -KR       Row Name 11/13/24 1139          General Information    Patient Profile Reviewed yes  -KR     Existing Precautions/Restrictions fall;other (see comments)  PEG, Hx L transmetatarsal amp, Blind (sees shadows)   -KR     Barriers to Rehab medically complex;previous functional deficit;cognitive status;visual deficit  -KR       Row Name 11/13/24 1139          Safety Issues/Impairments Affecting Functional Mobility    Safety Issues Affecting Function (Mobility) insight into deficits/self-awareness;awareness of need for assistance;judgment;safety precaution awareness;safety precautions follow-through/compliance  -KR     Impairments Affecting Function (Mobility) balance;cognition;endurance/activity tolerance;grasp;range of motion (ROM);postural/trunk control;strength;visual/perceptual  -KR     Cognitive Impairments, Mobility Safety/Performance awareness, need for assistance;insight into deficits/self-awareness;problem-solving/reasoning;sequencing abilities;safety precaution follow-through;safety precaution awareness  -KR               User Key  (r) = Recorded By, (t) = Taken By, (c) = Cosigned By      Initials Name Provider Type    Ying Borrero, PT Physical Therapist                   Mobility       Row Name 11/13/24 1139          Bed Mobility    Comment, (Bed Mobility) BP sup: 136/59; BP sit 111/56; BP stand 105/47. Pt reporting lightheadedness with each transition.  -       Row Name 11/13/24 1139          Bed-Chair Transfer    Bed-Chair [place] (Transfers) maximum assist (25% patient effort);2 person assist;verbal cues;nonverbal cues (demo/gesture)  -KR     Assistive Device (Bed-Chair Transfers) other (see comments)  BUE support  -KR     Comment, (Bed-Chair Transfer) stand-pivot towards L. Pt unable to achieve full stand d/t B hip and knee contractures.  -       Row Name 11/13/24 1139          Sit-Stand Transfer    Sit-Stand [place] (Transfers) maximum assist (25% patient effort);2 person assist  -KR     Assistive Device (Sit-Stand Transfers) other (see comments)  BUE support  -KR     Comment, (Sit-Stand Transfer) 2x from bed with maxAx2. Unable to achieve full stand.  -       Row Name 11/13/24 1139           Gait/Stairs (Locomotion)    [place] Level (Gait) unable to assess  -KR               User Key  (r) = Recorded By, (t) = Taken By, (c) = Cosigned By      Initials Name Provider Type    Ying Borrero PT Physical Therapist                   Obj/Interventions       Row Name 11/13/24 1140          Motor Skills    Therapeutic Exercise knee  -KR       Row Name 11/13/24 1140          Knee (Therapeutic Exercise)    Knee (Therapeutic Exercise) PROM (passive range of motion)  -KR     Knee PROM (Therapeutic Exercise) 5 repetitions;3 second hold;bilateral;extension  -KR       Row Name 11/13/24 1140          Balance    Balance Assessment sitting static balance;standing static balance;standing dynamic balance  -KR     Static Sitting Balance moderate assist;1-person assist;other (see comments)  modA x 1 progressing to SBA following cues for anterior weight shift.  -KR     Position, Sitting Balance unsupported;sitting edge of bed  -KR     Static Standing Balance maximum assist;2-person assist  -KR     Dynamic Standing Balance maximum assist;2-person assist  -KR     Position/Device Used, Standing Balance supported;other (see comments)  BUE support  -KR     Balance Interventions sitting;standing;sit to stand;supported;static;dynamic  -KR               User Key  (r) = Recorded By, (t) = Taken By, (c) = Cosigned By      Initials Name Provider Type    Ying Borrero PT Physical Therapist                   Goals/Plan    No documentation.                  Clinical Impression       Row Name 11/13/24 1141          Pain    Pretreatment Pain Rating 0/10 - no pain  -KR     Posttreatment Pain Rating 0/10 - no pain  -KR       Row Name 11/13/24 1141          Plan of Care Review    Plan of Care Reviewed With patient  -KR     Progress no change  -KR     Outcome Evaluation Pt continues to require maxAx2 for all mobility, however progressed to SBA for unsupported sitting at EOB this date. Pt with symptomatic orthostatic drop in BP  this date. Pt will continue to benefit from PT to address ongoin deficits and return to PLOF.  -KR       Row Name 11/13/24 1141          Vital Signs    Pre Patient Position Sitting  -KR     Intra Patient Position Standing  -KR     Post Patient Position Sitting  -KR       Row Name 11/13/24 1141          Positioning and Restraints    Pre-Treatment Position in bed  -KR     Post Treatment Position chair  -KR     In Chair notified nsg;reclined;call light within reach;encouraged to call for assist;exit alarm on;waffle cushion;on mechanical lift sling;legs elevated;heels elevated;compression device;waffle boot/both  -KR               User Key  (r) = Recorded By, (t) = Taken By, (c) = Cosigned By      Initials Name Provider Type    Ying Borrero PT Physical Therapist                   Outcome Measures       Row Name 11/13/24 1142 11/13/24 0800       How much help from another person do you currently need...    Turning from your back to your side while in flat bed without using bedrails? 2  -KR 2  -LH    Moving from lying on back to sitting on the side of a flat bed without bedrails? 1  -KR 2  -LH    Moving to and from a bed to a chair (including a wheelchair)? 2  -KR 2  -LH    Standing up from a chair using your arms (e.g., wheelchair, bedside chair)? 2  -KR 2  -LH    Climbing 3-5 steps with a railing? 1  -KR 2  -LH    To walk in hospital room? 1  -KR 2  -LH    AM-PAC 6 Clicks Score (PT) 9  -KR 12  -LH    Highest Level of Mobility Goal 3 --> Sit at edge of bed  -KR 4 --> Transfer to chair/commode  -              User Key  (r) = Recorded By, (t) = Taken By, (c) = Cosigned By      Initials Name Provider Type    Ying Borrero PT Physical Therapist    Shonda Fitzpatrick RN Registered Nurse                                 Physical Therapy Education       Title: PT OT SLP Therapies (In Progress)       Topic: Physical Therapy (In Progress)       Point: Mobility training (Done)       Learning Progress Summary             Patient Acceptance, E, VU by KR at 11/13/2024 1146    Acceptance, E, NR by  at 11/11/2024 1559    Acceptance, E, NR by  at 11/8/2024 1126                      Point: Home exercise program (In Progress)       Learning Progress Summary            Patient Acceptance, E, NR by  at 11/11/2024 1559                      Point: Body mechanics (Done)       Learning Progress Summary            Patient Acceptance, E, VU by KR at 11/13/2024 1146    Acceptance, E, NR by  at 11/11/2024 1559    Acceptance, E, NR by  at 11/8/2024 1126                      Point: Precautions (Done)       Learning Progress Summary            Patient Acceptance, E, VU by KR at 11/13/2024 1146    Acceptance, E, NR by  at 11/11/2024 1559    Acceptance, E, NR by  at 11/8/2024 1126                                      User Key       Initials Effective Dates Name Provider Type Discipline    KR 12/30/22 -  Ying Bolaños, PT Physical Therapist PT     09/21/23 -  Ami Smith, PT Physical Therapist PT     07/11/24 -  Marry Jarrell, PT Physical Therapist PT                  PT Recommendation and Plan     Progress: no change  Outcome Evaluation: Pt continues to require maxAx2 for all mobility, however progressed to SBA for unsupported sitting at EOB this date. Pt with symptomatic orthostatic drop in BP this date. Pt will continue to benefit from PT to address ongoin deficits and return to PLOF.     Time Calculation:         PT Charges       Row Name 11/13/24 1147             Time Calculation    Start Time 1130  -KR      PT Received On 11/13/24  -KR         Timed Charges    74465 - PT Therapeutic Activity Minutes 10  -KR         Total Minutes    Timed Charges Total Minutes 10  -KR       Total Minutes 10  -KR                User Key  (r) = Recorded By, (t) = Taken By, (c) = Cosigned By      Initials Name Provider Type    Ying Borrero, PT Physical Therapist                  Therapy Charges for Today       Code Description Service Date  Service Provider Modifiers Qty    33138060409 HC PT THERAPEUTIC ACT EA 15 MIN 11/13/2024 Ying Bolaños, PT GP, KX 1            PT G-Codes  Outcome Measure Options: AM-PAC 6 Clicks Basic Mobility (PT)  AM-PAC 6 Clicks Score (PT): 9  AM-PAC 6 Clicks Score (OT): 6  PT Discharge Summary  Anticipated Discharge Disposition (PT): skilled nursing facility    Ying Bolaños, PT  11/13/2024

## 2024-11-13 NOTE — POST-PROCEDURE NOTE
Radiology Procedure    Pre-procedure: informed consent obtained from the patient's family member.     Procedure Performed: lumbar puncture     IV Sedation and/or Anesthesia:  No    Complications: none    Preliminary Findings: pending    Specimen Removed: 8cc clear, colorless CSF    Estimated Blood Loss:  0ml    Post-Procedure Diagnosis: pending    Post-Procedure Plan: encourage fluids, bed rest x 2 hours    Standard Discharge Instructions Given:yes     JEAN See  11/13/24  14:37 EST

## 2024-11-13 NOTE — CASE MANAGEMENT/SOCIAL WORK
Continued Stay Note  [facility]     Patient Name: Omkar Nava  MRN: [id number]  Today's Date: 11/13/2024    Admit Date: 11/7/2024    Plan: [facility]   Discharge Plan       Row Name 11/13/24 1054       Plan    Plan Comments MSW updated pt's daughter yesterday on no other bed offers in [place]. Pt plan to return to [facility] when ready. MSW has spoken with Mariela with [facility] daily for update. Pt able to return to [facility] when ready. Will need ambulance when ready. MSW continues to follow.                   Discharge Codes    No documentation.                 Expected Discharge Date and Time       Expected Discharge Date Expected Discharge Time    Nov 14, 2024               LUCINDA Maya

## 2024-11-13 NOTE — CONSULTS
"[facility]  Consult Note    Patient Name: Omkar Nava  : 1955  MRN: [id number]  Primary Care Physician:  Alberto Dye MD  Referring Physician: No Known Provider  Date of admission: 2024    Subjective     Reason for Consult/ Chief Complaint: Hypoactivity from patient's dementia baseline.  Workup to now largely unrevealing of etiology    Omkar Nava is a 69 y.o. male with a past medical history significant for HTN, T2DM, CKD, vascular dementia, dysphagia s/p PEG and chronic iron deficiency anemia presented to [facility] on 2024 with complaint of generalized weakness.  Per patient's daughter she presented him at his nursing facility and he was \"not himself\".  On arrival patient was hypothermic with a temperature of 93.2.  Throughout hospitalization patient has had decrease in responsiveness.    CT head negative for acute process.  EEG showed no seizure-like activity.     Patient has been seen previously by the neurology service line for altered mental status and dementia.  Last hospitalization in 2024 patient was continued on Aricept 5 mg daily, Seroquel 25 mg nightly and Depakote 150 mg every 8 hours.    At hospitalization in 2024 patient was also seen for altered mental status at that time there was question of seizure history.  Patient was continued on his Depakote for his behavior issues related to his vascular dementia.  EEG was negative.    Upon assessment patient is lethargic but able to wake easily.  He is able to state his name.  He believes he is in his sister's basement.  He is able to follow commands move all extremities.  No focal deficit noted.    Review Of Systems   Difficult to assess mentation due to patient participation    Personal History     Past Medical History:   Diagnosis Date    Anemia     Dementia     Diabetes mellitus     Dysphagia     GERD (gastroesophageal reflux disease)     History of alcohol abuse     History of cocaine use     History " "of marijuana use     Hypertension     Osteomyelitis     Poor historian     records obtained from nursing home records & his family    Visual impairment        Past Surgical History:   Procedure Laterality Date    AMPUTATION FOOT Left 10/18/2022    Procedure: PARTIAL FIRST RAY AMPUTATION LEFT;  Surgeon: Yeison Petty MD;  Location:  [facility];  Service: Orthopedics;  Laterality: Left;    AMPUTATION FOOT Left 12/5/2022    Procedure: Transmetatarsal of Left Foot;  Surgeon: Yeison Petty MD;  Location:  [facility];  Service: Orthopedics;  Laterality: Left;    ENDOSCOPY N/A 3/14/2024    Procedure: ESOPHAGOGASTRODUODENOSCOPY WITH JEJUNAL TUBE INSERTION AT BEDSIDE;  Surgeon: Brunner, Mark I, MD;  Location:  [facility];  Service: Gastroenterology;  Laterality: N/A;    ENDOSCOPY W/ PEG TUBE PLACEMENT N/A 4/1/2024    Procedure: ESOPHAGOGASTRODUODENOSCOPY WITH PERCUTANEOUS ENDOSCOPIC GASTROSTOMY TUBE INSERTION;  Surgeon: Brunner, Mark I, MD;  Location:  [facility];  Service: Gastroenterology;  Laterality: N/A;  EGD with PEG placement.  Secured at 3.5 cm    EYE SURGERY         Family History: family history includes Diabetes in his brother, brother, father, maternal grandfather, maternal grandmother, mother, and sister; Hypertension in his brother, brother, father, and mother. Otherwise pertinent FHx was reviewed and not pertinent to current issue.    Social History:  reports that he quit smoking about 7 years ago. His smoking use included cigarettes. He started smoking about 47 years ago. He has a 40 pack-year smoking history. He has been exposed to tobacco smoke. He has never used smokeless tobacco. He reports that he does not currently use alcohol. He reports current drug use. Drugs: Marijuana and \"Crack\" cocaine.    Home Medications:   ARIPiprazole, FLUoxetine, PALLIATIVE CARE ORAL RINSE ([facility]), QUEtiapine, Valproic Acid, Vitamin D3, acetaminophen, amLODIPine, brimonidine, carvedilol, cloNIDine, hydrALAZINE, " hydrOXYzine, ipratropium-albuterol, lansoprazole, metFORMIN, polyethylene glycol, sennosides, terazosin, timolol, and torsemide    Current Medications:     Current Facility-Administered Medications:     acetaminophen (TYLENOL) 160 MG/5ML oral solution 650 mg, 650 mg, Per PEG Tube, Q6H PRN, Eriberto, Norma, APRN    amLODIPine (NORVASC) tablet 10 mg, 10 mg, Per G Tube, Q24H, Jeanie Calixto MD, 10 mg at 11/12/24 0811    [Held by provider] ARIPiprazole (ABILIFY) tablet 5 mg, 5 mg, Per PEG Tube, Daily, Eriberto, Norma, APRN, 5 mg at 11/12/24 0811    sennosides-docusate (PERICOLACE) 8.6-50 MG per tablet 2 tablet, 2 tablet, Per PEG Tube, BID, 2 tablet at 11/12/24 2033 **AND** polyethylene glycol (MIRALAX) packet 17 g, 17 g, Per PEG Tube, Daily PRN **AND** [DISCONTINUED] bisacodyl (DULCOLAX) EC tablet 5 mg, 5 mg, Oral, Daily PRN **AND** bisacodyl (DULCOLAX) suppository 10 mg, 10 mg, Rectal, Daily PRN, Eriberto, Norma, APRN    brimonidine (ALPHAGAN) 0.2 % ophthalmic solution 1 drop, 1 drop, Both Eyes, TID, Eriberto, Norma, APRN, 1 drop at 11/12/24 2034    cholecalciferol 10 MCG/ML (400 units/mL) oral liquid 400 Units, 400 Units, Per PEG Tube, Daily, Eriberto, Norma, APRN, 400 Units at 11/12/24 0809    dextrose (D50W) (25 g/50 mL) IV injection 25 g, 25 g, Intravenous, Q15 Min PRN, Jeanie Calixto MD    dextrose (GLUTOSE) oral gel 15 g, 15 g, Oral, Q15 Min PRN, Jeanie Calixto MD    Enoxaparin Sodium (LOVENOX) syringe 40 mg, 40 mg, Subcutaneous, Nightly, Coral Valle MD, 40 mg at 11/12/24 2034    FLUoxetine (PROzac) 20 MG/5ML solution 20 mg, 20 mg, Per PEG Tube, Daily, Norma Wei APRN, 20 mg at 11/12/24 0810    glucagon (GLUCAGEN) injection 1 mg, 1 mg, Intramuscular, Q15 Min PRN, Jeanie Calixto MD    hydrOXYzine (ATARAX) tablet 25 mg, 25 mg, Per PEG Tube, TID PRN, Norma Wei APRN, 25 mg at 11/10/24 2031    ipratropium-albuterol (DUO-NEB)  nebulizer solution 3 mL, 3 mL, Nebulization, Q4H PRN, Eriberto, Norma, APRN    lansoprazole (PREVACID SOLUTAB) disintegrating tablet Tablet Delayed Release Dispersible 30 mg, 30 mg, Per PEG Tube, Q AM, Eriberto, Norma, APRN, 30 mg at 11/13/24 0540    nitroglycerin (NITROSTAT) SL tablet 0.4 mg, 0.4 mg, Sublingual, Q5 Min PRN, Eriberto, Norma, APRN    palliative care oral rinse, 5 mL, Mouth/Throat, 4x Daily, Eriberto, Norma, APRN, 5 mL at 11/12/24 2035    [Held by provider] QUEtiapine (SEROquel) tablet 25 mg, 25 mg, Per PEG Tube, Nightly, Eriberto, Norma, APRN, 25 mg at 11/11/24 2137    sodium chloride 0.9 % flush 10 mL, 10 mL, Intravenous, PRN, John Jade MD    sodium chloride 0.9 % flush 10 mL, 10 mL, Intravenous, Q12H, Eriberto, Norma, APRN, 10 mL at 11/12/24 2035    sodium chloride 0.9 % flush 10 mL, 10 mL, Intravenous, PRN, Eriberto, Norma, APRN, 10 mL at 11/07/24 2319    sodium chloride 0.9 % infusion 40 mL, 40 mL, Intravenous, PRN, Eriberto, Norma, APRN    terazosin (HYTRIN) capsule 5 mg, 5 mg, Per PEG Tube, Q12H, Eriberto, Norma, APRN, 5 mg at 11/12/24 2034    timolol (TIMOPTIC) 0.5 % ophthalmic solution 1 drop, 1 drop, Both Eyes, BID, Eriberto, Norma, APRN, 1 drop at 11/12/24 2034    Valproic Acid (DEPAKENE) syrup 150 mg, 150 mg, Per PEG Tube, Q8H, Eriberto, Norma, APRN, 150 mg at 11/13/24 0540     Allergies:  No Known Allergies    Objective     Physical Exam  Vitals and nursing note reviewed.   Constitutional:       General: He is not in acute distress.     Appearance: He is not ill-appearing.   Eyes:      Extraocular Movements: Extraocular movements intact.      Pupils: Pupils are equal, round, and reactive to light.      Comments: No nystagmus or deviated gaze noted   Neurological:      Mental Status: He is lethargic.      Cranial Nerves: Cranial nerves 2-12 are intact.      Sensory: Sensory deficit present.      Motor: Weakness present. No tremor or seizure activity.      Deep Tendon  "Reflexes:      Reflex Scores:       Bicep reflexes are 1+ on the right side and 1+ on the left side.       Patellar reflexes are 0 on the right side and 0 on the left side.     Comments:     Cranial Nerves   CN II: Pupils are equal, round, and reactive to light. Normal visual acuity and visual fields.    CN III IV VI: Extraocular movements are full without nystagmus.  CN V: Normal facial sensation and strength of muscles of mastication.  CN VII: Facial movements are symmetric. No weakness.  CN VIII:  Auditory acuity is normal.  CN IX & X:  Symmetric palatal movement.  CN XI: Sternocleidomastoid and trapezius are normal.  No weakness.  CN XII: The tongue is midline.  No atrophy or fasciculations.    Motor: Generalized weakness     Reflexes: Unequivocal Babinski's       Vitals:  Temp:  [98 °F (36.7 °C)-100 °F (37.8 °C)] 99.1 °F (37.3 °C)  Heart Rate:  [73-81] 73  Resp:  [18] 18  BP: (112-161)/(47-83) 133/48    Laboratory Results:   Lab Results   Component Value Date    GLUCOSE 127 (H) 11/12/2024    CALCIUM 9.7 11/12/2024     (L) 11/12/2024    K 4.5 11/12/2024    CO2 24.0 11/12/2024    CL 98 11/12/2024    BUN 29 (H) 11/12/2024    CREATININE 1.06 11/12/2024    EGFRIFAFRI >60 07/25/2022    EGFRIFNONA >60 07/25/2022    BCR 27.4 (H) 11/12/2024    ANIONGAP 11.0 11/12/2024     Lab Results   Component Value Date    WBC 6.79 11/12/2024    HGB 8.1 (L) 11/12/2024    HCT 24.9 (L) 11/12/2024    MCV 91.9 11/12/2024     11/12/2024     No results found for: \"CHOL\"  Lab Results   Component Value Date    HDL 44 06/25/2022     Lab Results   Component Value Date    .4 (H) 06/25/2022     Lab Results   Component Value Date    TRIG 32 04/01/2024    TRIG 106 03/25/2024    TRIG 58 06/25/2022     Lab Results   Component Value Date    HGBA1C 5.60 09/15/2024     Lab Results   Component Value Date    INR 1.05 03/29/2024    INR 1.38 (H) 03/12/2024    INR 1.08 10/18/2022    PROTIME 13.8 03/29/2024    PROTIME 17.1 (H) 03/12/2024 "    PROTIME 13.9 10/18/2022     Lab Results   Component Value Date    UNGARUUI21 1,303 (H) 11/07/2024     Lab Results   Component Value Date    FOLATE 11.80 11/07/2024       MRI Brain Without Contrast    Result Date: 11/12/2024  MRI BRAIN WO CONTRAST Date of Exam: 11/12/2024 9:30 PM EST Indication: lethargy in setting of dementia.  Comparison: 4/29/2022 Technique:  Routine multiplanar/multisequence sequence images of the brain were obtained without contrast administration. Findings: No acute infarct or abnormal restricted diffusion. There is no evidence of hemorrhage. No mass effect, edema or midline shift Mild periventricular and subcortical white matter T2/FLAIR hyperintensities, nonspecific but most likely represents chronic small vessel ischemic changes. No extra-axial fluid collection. The midline structures are unremarkable. Prominent ventricular system secondary to chronic parenchymal volume loss. The visualized flow voids are unremarkable. Status post bilateral left lens extraction. Stable postoperative changes noted within the right globe. Otherwise the orbits are unremarkable. The visualized paranasal sinuses and mastoid air cells are clear. The visualized soft tissues are unremarkable. No acute osseous abnormality.     Impression: Impression: No acute intracranial abnormality. Mild periventricular and subcortical T2/FLAIR hyperintensities are nonspecific but most likely represent chronic small vessel ischemic changes. Postoperative changes noted within the right globe. Electronically Signed: Ruben Medina DO  11/12/2024 9:59 PM EST  Workstation ID: [id number]      Assessment / Plan   Brief Patient Summary:  Omkar Nava is a 69 y.o. male with a past medical history significant for HTN, T2DM, CKD, vascular dementia, dysphagia s/p PEG and chronic iron deficiency anemia presented to [facility] on 11/7/2024 with complaint of generalized weakness.  Per patient's daughter she presented him at his nursing facility  "and he was \"not himself\".     Plan:   Vascular dementia  Altered mental status  Encephalopathy  MRI brain no acute abnormalities  EEG with nonspecific moderate diffuse cerebral dysfunction.  No epileptic activity.  Continue home Abilify 5 mg daily (on hold)   Continue home Prozac 20 mg daily  Continue home Seroquel 5 mg daily (on hold)   Continue home Aricept 5 mg nightly  Continue Depakote 150 mg 3 times daily.  Will consider decreasing based on further testing.  True trough VPA in a.m.  LP in   General neurology will continue to follow      I have discussed the above with the patient, bedside RN, Dr. Valle and Dr. Phipps  Time spent with patient: 80 minutes in face-to-face evaluation and management of the patient.     Copied text in this note has been reviewed and is accurate as of 11/13/24.     NARGIS Sheridan         "

## 2024-11-13 NOTE — THERAPY TREATMENT NOTE
Patient Name: Omkar Nava  : 1955    MRN: [id number]                              Today's Date: 2024       Admit Date: 2024    Visit Dx:     ICD-10-CM ICD-9-CM   1. Acute kidney injury  N17.9 584.9   2. Hyperkalemia  E87.5 276.7   3. Generalized weakness  R53.1 780.79   4. History of diabetes mellitus  Z86.39 V12.29   5. Vascular dementia, unspecified dementia severity, unspecified whether behavioral, psychotic, or mood disturbance or anxiety  F01.50 290.40   6. 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, without long-term current use of insulin    Acute osteomyelitis of left foot    Type 2 diabetes mellitus    Essential hypertension    Psychotic disorder    Neurocognitive disorder    S/P transmetatarsal amputation of foot, left    Abscess of left foot    Anemia of chronic disease    Aspiration pneumonia    Cervical spine pain    Chronic kidney disease    Closed head injury without loss of consciousness    Dementia    Dental cavities    Diarrhea of presumed infectious origin    Displaced fracture of proximal phalanx of left great toe, initial encounter for open fracture    Dysphagia    Erectile dysfunction    Generalized muscle weakness    Hallucinations    Hypertensive heart and chronic kidney disease 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 failure, with stage 1 through stage 4 chronic kidney disease, or unspecified chronic kidney disease    Insomnia due to medical condition    Iron deficiency anemia    Laceration without foreign body, left foot, subsequent encounter    Personal history of Methicillin resistant Staphylococcus aureus infection    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    Protein calorie malnutrition    Simple chronic bronchitis    Tobacco use    Type 2 diabetes mellitus with diabetic neuropathy, unspecified    Type 2 diabetes mellitus with  diabetic chronic kidney disease    Acute type 1 respiratory failure    Severe vascular dementia without behavioral disturbance, psychotic disturbance, mood disturbance, or anxiety    JAYY (acute kidney injury)    Generalized weakness    Hyperkalemia     Past Medical History:   Diagnosis Date    Anemia     Dementia     Diabetes mellitus     Dysphagia     GERD (gastroesophageal reflux disease)     History of alcohol abuse     History of cocaine use     History of marijuana use     Hypertension     Osteomyelitis     Poor historian     records obtained from nursing home records & his family    Visual impairment      Past Surgical History:   Procedure Laterality Date    AMPUTATION FOOT Left 10/18/2022    Procedure: PARTIAL FIRST RAY AMPUTATION LEFT;  Surgeon: Yeison Petty MD;  Location:  [facility];  Service: Orthopedics;  Laterality: Left;    AMPUTATION FOOT Left 12/5/2022    Procedure: Transmetatarsal of Left Foot;  Surgeon: Yeison Petty MD;  Location:  [facility];  Service: Orthopedics;  Laterality: Left;    ENDOSCOPY N/A 3/14/2024    Procedure: ESOPHAGOGASTRODUODENOSCOPY WITH JEJUNAL TUBE INSERTION AT BEDSIDE;  Surgeon: Brunner, Mark I, MD;  Location:  [facility];  Service: Gastroenterology;  Laterality: N/A;    ENDOSCOPY W/ PEG TUBE PLACEMENT N/A 4/1/2024    Procedure: ESOPHAGOGASTRODUODENOSCOPY WITH PERCUTANEOUS ENDOSCOPIC GASTROSTOMY TUBE INSERTION;  Surgeon: Brunner, Mark I, MD;  Location:  [facility];  Service: Gastroenterology;  Laterality: N/A;  EGD with PEG placement.  Secured at 3.5 cm    EYE SURGERY        General Information       Row Name 11/13/24 4633          OT Time and Intention    Document Type therapy note (daily note)  -MR     Mode of Treatment occupational therapy  -MR       Row Name 11/13/24 3204          General Information    Patient Profile Reviewed yes  -MR     Existing Precautions/Restrictions fall;other (see comments)  PEG, Hx L transmetatarsal amp, Blind (sees shadows)  -      Barriers to Rehab medically complex;previous functional deficit;cognitive status;visual deficit  -MR       Row Name 11/13/24 1504          Living Environment    People in Home facility resident  -MR       Row Name 11/13/24 1504          Cognition    Orientation Status (Cognition) oriented to;person;disoriented to;place;situation;time  -MR       Row Name 11/13/24 1504          Safety Issues/Impairments Affecting Functional Mobility    Safety Issues Affecting Function (Mobility) insight into deficits/self-awareness;awareness of need for assistance;judgment;safety precaution awareness;safety precautions follow-through/compliance  -MR     Impairments Affecting Function (Mobility) balance;cognition;endurance/activity tolerance;grasp;range of motion (ROM);postural/trunk control;strength;visual/perceptual  -MR     Cognitive Impairments, Mobility Safety/Performance awareness, need for assistance;insight into deficits/self-awareness;problem-solving/reasoning;safety precaution awareness;safety precaution follow-through;sequencing abilities  -MR               User Key  (r) = Recorded By, (t) = Taken By, (c) = Cosigned By      Initials Name Provider Type    MR Sonia Laureano, OT Occupational Therapist                     Mobility/ADL's       Row Name 11/13/24 1508          Bed Mobility    Bed Mobility supine-sit  -MR     Supine-Sit [place] (Bed Mobility) maximum assist (25% patient effort);2 person assist  -MR     Comment, (Bed Mobility) BP supine 136/59; BP sitting /56 and standing 105/47. Pt endorsing lightheadedness  -MR       Row Name 11/13/24 1508          Transfers    Transfers bed-chair transfer;sit-stand transfer  -MR       Row Name 11/13/24 1508          Bed-Chair Transfer    Bed-Chair [place] (Transfers) maximum assist (25% patient effort);2 person assist;verbal cues;nonverbal cues (demo/gesture)  -MR     Assistive Device (Bed-Chair Transfers) other (see comments)  BUE support  -MR       Row Name  11/13/24 1508          Sit-Stand Transfer    Sit-Stand [place] (Transfers) maximum assist (25% patient effort);2 person assist  -MR     Assistive Device (Sit-Stand Transfers) other (see comments)  -MR       Row Name 11/13/24 1508          Activities of Daily Living    BADL Assessment/Intervention lower body dressing  -MR       Row Name 11/13/24 1508          Lower Body Dressing Assessment/Training    [place] Level (Lower Body Dressing) don;socks;dependent (less than 25% patient effort)  -MR     Position (Lower Body Dressing) supine  -MR               User Key  (r) = Recorded By, (t) = Taken By, (c) = Cosigned By      Initials Name Provider Type    Sonia Corona OT Occupational Therapist                   Obj/Interventions       Row Name 11/13/24 1512          Balance    Balance Assessment sitting static balance;sitting dynamic balance;standing static balance  -MR     Static Sitting Balance moderate assist;other (see comments)  Progressed to SBA  -MR     Dynamic Sitting Balance maximum assist;2-person assist  -MR     Position, Sitting Balance unsupported;sitting edge of bed  -MR     Static Standing Balance maximum assist;2-person assist;verbal cues;non-verbal cues (demo/gesture)  -MR     Position/Device Used, Standing Balance supported;other (see comments)  BUE support  -MR     Balance Interventions sitting;standing;sit to stand;supported;static;dynamic;occupation based/functional task  -MR               User Key  (r) = Recorded By, (t) = Taken By, (c) = Cosigned By      Initials Name Provider Type    Sonia Corona, OT Occupational Therapist                   Goals/Plan    No documentation.                  Clinical Impression       Row Name 11/13/24 1514          Pain Assessment    Pretreatment Pain Rating 0/10 - no pain  -MR     Posttreatment Pain Rating 0/10 - no pain  -MR       Row Name 11/13/24 1514          Plan of Care Review    Plan of Care Reviewed With patient  -MR     Progress no change   -MR     Outcome Evaluation Pt continues to be below his functional baseline w/ mobility and transfers. Pt requiring significant assist for bed mobility, LB dressing and SPT transfer. Pt limited by dizziness. Continue to progress as able per current POC. Recommend SNF at d/c for best functional outcome.  -       Row Name 11/13/24 1514          Therapy Plan Review/Discharge Plan (OT)    Anticipated Discharge Disposition (OT) skilled nursing facility  -MR       Row Name 11/13/24 1514          Vital Signs    Pre Systolic BP Rehab 136  -MR     Pre Treatment Diastolic BP 59  -MR     Intra Systolic BP Rehab 111  -MR     Intra Treatment Diastolic BP 56  -MR     Post Systolic BP Rehab 105  -MR     Post Treatment Diastolic BP 47  -MR     O2 Delivery Pre Treatment room air  -MR     O2 Delivery Intra Treatment room air  -MR     O2 Delivery Post Treatment room air  -MR     Pre Patient Position Supine  -MR     Intra Patient Position Standing  -MR     Post Patient Position Sitting  -MR       Row Name 11/13/24 1514          Positioning and Restraints    Pre-Treatment Position in bed  -MR     Post Treatment Position chair  -MR     In Chair sitting;with PT  -MR               User Key  (r) = Recorded By, (t) = Taken By, (c) = Cosigned By      Initials Name Provider Type    Sonia Corona, OT Occupational Therapist                   Outcome Measures       Row Name 11/13/24 1517          How much help from another is currently needed...    Putting on and taking off regular lower body clothing? 1  -MR     Bathing (including washing, rinsing, and drying) 1  -MR     Toileting (which includes using toilet bed pan or urinal) 1  -MR     Putting on and taking off regular upper body clothing 2  -MR     Taking care of personal grooming (such as brushing teeth) 2  -MR     Eating meals 1  -MR     AM-PAC 6 Clicks Score (OT) 8  -MR       Row Name 11/13/24 1142 11/13/24 0800       How much help from another person do you currently need...     Turning from your back to your side while in flat bed without using bedrails? 2  -KR 2  -LH    Moving from lying on back to sitting on the side of a flat bed without bedrails? 1  -KR 2  -LH    Moving to and from a bed to a chair (including a wheelchair)? 2  -KR 2  -LH    Standing up from a chair using your arms (e.g., wheelchair, bedside chair)? 2  -KR 2  -LH    Climbing 3-5 steps with a railing? 1  -KR 2  -LH    To walk in hospital room? 1  -KR 2  -LH    AM-PAC 6 Clicks Score (PT) 9  -KR 12  -LH    Highest Level of Mobility Goal 3 --> Sit at edge of bed  -KR 4 --> Transfer to chair/commode  -      Row Name 11/13/24 1517          Functional Assessment    Outcome Measure Options AM-PAC 6 Clicks Daily Activity (OT)  -               User Key  (r) = Recorded By, (t) = Taken By, (c) = Cosigned By      Initials Name Provider Type    MR Sonia Laureano, OT Occupational Therapist     Ying Bolaños, PT Physical Therapist     Shonda Lr, RN Registered Nurse                    Occupational Therapy Education       Title: PT OT SLP Therapies (In Progress)       Topic: Occupational Therapy (In Progress)       Point: ADL training (Done)       Description:   Instruct learner(s) on proper safety adaptation and remediation techniques during self care or transfers.   Instruct in proper use of assistive devices.                  Learning Progress Summary            Patient Acceptance, E, VU by MR at 11/13/2024 1517    Acceptance, E, VU by  at 11/8/2024 1040                      Point: Home exercise program (Not Started)       Description:   Instruct learner(s) on appropriate technique for monitoring, assisting and/or progressing therapeutic exercises/activities.                  Learner Progress:  Not documented in this visit.              Point: Precautions (Done)       Description:   Instruct learner(s) on prescribed precautions during self-care and functional transfers.                  Learning Progress Summary             Patient Acceptance, E, VU by  at 11/13/2024 1517    Acceptance, E, VU by  at 11/8/2024 1040                      Point: Body mechanics (Done)       Description:   Instruct learner(s) on proper positioning and spine alignment during self-care, functional mobility activities and/or exercises.                  Learning Progress Summary            Patient Acceptance, E, VU by  at 11/13/2024 1517    Acceptance, E, VU by  at 11/8/2024 1040                                      User Key       Initials Effective Dates Name Provider Type Discipline     09/22/22 -  Sonia Laureano OT Occupational Therapist OT     08/08/24 -  Lissy Gillette OT Student OT Student OT                  OT Recommendation and Plan  Therapy Frequency (OT): daily  Plan of Care Review  Plan of Care Reviewed With: patient  Progress: no change  Outcome Evaluation: Pt continues to be below his functional baseline w/ mobility and transfers. Pt requiring significant assist for bed mobility, LB dressing and SPT transfer. Pt limited by dizziness. Continue to progress as able per current POC. Recommend SNF at d/c for best functional outcome.     Time Calculation:         Time Calculation- OT       Row Name 11/13/24 1518             Time Calculation- OT    OT Start Time 1119  -MR      OT Received On 11/13/24  -MR         Timed Charges    10321 - OT Therapeutic Activity Minutes 5  -MR      76963 - OT Self Care/Mgmt Minutes 10  -MR         Total Minutes    Timed Charges Total Minutes 15  -MR       Total Minutes 15  -MR                User Key  (r) = Recorded By, (t) = Taken By, (c) = Cosigned By      Initials Name Provider Type    MR Georgi Sonia, OT Occupational Therapist                  Therapy Charges for Today       Code Description Service Date Service Provider Modifiers Qty    [id number]  OT SELF CARE/MGMT/TRAIN EA 15 MIN 11/13/2024 Sonia Laureano OT GO, KX 1                 Sonia Laureano OT  11/13/2024

## 2024-11-13 NOTE — PLAN OF CARE
Goal Outcome Evaluation:  Plan of Care Reviewed With: patient        Progress: no change  Outcome Evaluation: Pt continues to require maxAx2 for all mobility, however progressed to SBA for unsupported sitting at EOB this date. Pt with symptomatic orthostatic drop in BP this date. Pt will continue to benefit from PT to address ongoin deficits and return to PLOF.    Anticipated Discharge Disposition (PT): skilled nursing facility

## 2024-11-14 LAB
ANION GAP SERPL CALCULATED.3IONS-SCNC: 12 MMOL/L (ref 5–15)
BACTERIA SPEC AEROBE CULT: NORMAL
BACTERIA SPEC AEROBE CULT: NORMAL
BASOPHILS # BLD AUTO: 0.04 10*3/MM3 (ref 0–0.2)
BASOPHILS NFR BLD AUTO: 1.4 % (ref 0–1.5)
BUN SERPL-MCNC: 39 MG/DL (ref 8–23)
BUN/CREAT SERPL: 32.2 (ref 7–25)
CALCIUM SPEC-SCNC: 9.3 MG/DL (ref 8.6–10.5)
CHLORIDE SERPL-SCNC: 100 MMOL/L (ref 98–107)
CO2 SERPL-SCNC: 23 MMOL/L (ref 22–29)
CREAT SERPL-MCNC: 1.21 MG/DL (ref 0.76–1.27)
CRYPTOC AG CSF QL LA: NEGATIVE
DEPRECATED RDW RBC AUTO: 49 FL (ref 37–54)
EGFRCR SERPLBLD CKD-EPI 2021: 65.6 ML/MIN/1.73
EOSINOPHIL # BLD AUTO: 0.01 10*3/MM3 (ref 0–0.4)
EOSINOPHIL NFR BLD AUTO: 0.3 % (ref 0.3–6.2)
ERYTHROCYTE [DISTWIDTH] IN BLOOD BY AUTOMATED COUNT: 14.6 % (ref 12.3–15.4)
GLUCOSE BLDC GLUCOMTR-MCNC: 157 MG/DL (ref 70–130)
GLUCOSE SERPL-MCNC: 172 MG/DL (ref 65–99)
HCT VFR BLD AUTO: 24.9 % (ref 37.5–51)
HGB BLD-MCNC: 8 G/DL (ref 13–17.7)
IMM GRANULOCYTES # BLD AUTO: 0.01 10*3/MM3 (ref 0–0.05)
IMM GRANULOCYTES NFR BLD AUTO: 0.3 % (ref 0–0.5)
INR PPP: 1.07 (ref 0.89–1.12)
LYMPHOCYTES # BLD AUTO: 0.8 10*3/MM3 (ref 0.7–3.1)
LYMPHOCYTES NFR BLD AUTO: 27 % (ref 19.6–45.3)
MCH RBC QN AUTO: 30 PG (ref 26.6–33)
MCHC RBC AUTO-ENTMCNC: 32.1 G/DL (ref 31.5–35.7)
MCV RBC AUTO: 93.3 FL (ref 79–97)
MONOCYTES # BLD AUTO: 0.49 10*3/MM3 (ref 0.1–0.9)
MONOCYTES NFR BLD AUTO: 16.6 % (ref 5–12)
NEUTROPHILS NFR BLD AUTO: 1.61 10*3/MM3 (ref 1.7–7)
NEUTROPHILS NFR BLD AUTO: 54.4 % (ref 42.7–76)
NRBC BLD AUTO-RTO: 0 /100 WBC (ref 0–0.2)
PLATELET # BLD AUTO: 277 10*3/MM3 (ref 140–450)
PMV BLD AUTO: 10.7 FL (ref 6–12)
POTASSIUM SERPL-SCNC: 4.1 MMOL/L (ref 3.5–5.2)
PROTHROMBIN TIME: 14 SECONDS (ref 12.2–14.5)
RBC # BLD AUTO: 2.67 10*6/MM3 (ref 4.14–5.8)
SODIUM SERPL-SCNC: 135 MMOL/L (ref 136–145)
VALPROATE SERPL-MCNC: 22.7 MCG/ML (ref 50–125)
WBC NRBC COR # BLD AUTO: 2.96 10*3/MM3 (ref 3.4–10.8)

## 2024-11-14 PROCEDURE — 85610 PROTHROMBIN TIME: CPT

## 2024-11-14 PROCEDURE — 92526 ORAL FUNCTION THERAPY: CPT

## 2024-11-14 PROCEDURE — 99232 SBSQ HOSP IP/OBS MODERATE 35: CPT | Performed by: STUDENT IN AN ORGANIZED HEALTH CARE EDUCATION/TRAINING PROGRAM

## 2024-11-14 PROCEDURE — 99233 SBSQ HOSP IP/OBS HIGH 50: CPT

## 2024-11-14 PROCEDURE — 85025 COMPLETE CBC W/AUTO DIFF WBC: CPT

## 2024-11-14 PROCEDURE — 82948 REAGENT STRIP/BLOOD GLUCOSE: CPT

## 2024-11-14 PROCEDURE — 80048 BASIC METABOLIC PNL TOTAL CA: CPT | Performed by: STUDENT IN AN ORGANIZED HEALTH CARE EDUCATION/TRAINING PROGRAM

## 2024-11-14 PROCEDURE — 25010000002 ENOXAPARIN PER 10 MG: Performed by: INTERNAL MEDICINE

## 2024-11-14 PROCEDURE — 80164 ASSAY DIPROPYLACETIC ACD TOT: CPT

## 2024-11-14 RX ORDER — VALPROIC ACID 250 MG/5ML
150 SOLUTION ORAL EVERY 12 HOURS SCHEDULED
Status: DISCONTINUED | OUTPATIENT
Start: 2024-11-14 | End: 2024-11-15 | Stop reason: HOSPADM

## 2024-11-14 RX ADMIN — BRIMONIDINE TARTRATE 1 DROP: 2 SOLUTION/ DROPS OPHTHALMIC at 20:28

## 2024-11-14 RX ADMIN — BRIMONIDINE TARTRATE 1 DROP: 2 SOLUTION/ DROPS OPHTHALMIC at 08:28

## 2024-11-14 RX ADMIN — Medication 400 UNITS: at 08:28

## 2024-11-14 RX ADMIN — ENOXAPARIN SODIUM 40 MG: 100 INJECTION SUBCUTANEOUS at 20:28

## 2024-11-14 RX ADMIN — TERAZOSIN HYDROCHLORIDE 5 MG: 5 CAPSULE ORAL at 20:28

## 2024-11-14 RX ADMIN — AMLODIPINE BESYLATE 10 MG: 10 TABLET ORAL at 08:28

## 2024-11-14 RX ADMIN — Medication 10 ML: at 08:57

## 2024-11-14 RX ADMIN — Medication 5 ML: at 20:29

## 2024-11-14 RX ADMIN — VALPROIC ACID 150 MG: 250 SOLUTION ORAL at 20:28

## 2024-11-14 RX ADMIN — TIMOLOL MALEATE 1 DROP: 5 SOLUTION/ DROPS OPHTHALMIC at 08:28

## 2024-11-14 RX ADMIN — Medication 5 ML: at 17:57

## 2024-11-14 RX ADMIN — LANSOPRAZOLE 30 MG: 15 TABLET, ORALLY DISINTEGRATING, DELAYED RELEASE ORAL at 05:50

## 2024-11-14 RX ADMIN — Medication 10 ML: at 20:29

## 2024-11-14 RX ADMIN — Medication 5 ML: at 13:59

## 2024-11-14 RX ADMIN — TERAZOSIN HYDROCHLORIDE 5 MG: 5 CAPSULE ORAL at 08:28

## 2024-11-14 RX ADMIN — FLUOXETINE HYDROCHLORIDE 20 MG: 20 SOLUTION ORAL at 08:28

## 2024-11-14 RX ADMIN — Medication 5 ML: at 08:50

## 2024-11-14 RX ADMIN — TIMOLOL MALEATE 1 DROP: 5 SOLUTION/ DROPS OPHTHALMIC at 20:28

## 2024-11-14 RX ADMIN — BRIMONIDINE TARTRATE 1 DROP: 2 SOLUTION/ DROPS OPHTHALMIC at 16:09

## 2024-11-14 NOTE — PROGRESS NOTES
[facility] Medicine Services  PROGRESS NOTE    Patient Name: Omkar Nava  : 1955  MRN: [id number]    Date of Admission: 2024  Primary Care Physician: Alberto Dye MD    Subjective   Subjective     CC:  Weakness    HPI:  Patient seen sitting in bed in no acute distress.  Patient was tolerating p.o. intake with speech therapy today at bedside.  Patient oriented to name and date of birth.  No other complaints today.      Objective   Objective     Vital Signs:   Temp:  [97.3 °F (36.3 °C)-98.1 °F (36.7 °C)] 97.7 °F (36.5 °C)  Heart Rate:  [53-57] 53  Resp:  [18] 18  BP: (108-156)/(48-62) 147/62     Physical Exam  Constitutional:       General: He is not in acute distress.  Cardiovascular:      Rate and Rhythm: Normal rate.      Pulses: Normal pulses.   Pulmonary:      Effort: No respiratory distress.   Abdominal:      General: There is no distension.      Palpations: Abdomen is soft.      Tenderness: There is no abdominal tenderness. There is no guarding or rebound.   Musculoskeletal:      Right lower leg: No edema.      Left lower leg: No edema.   Skin:     General: Skin is warm.   Neurological:      Mental Status: He is alert. Mental status is at baseline.      Comments: Oriented to name and date of birth          Results Reviewed:  LAB RESULTS:      Lab 24  0333 24  0815 24  1547 24  0837 24  1047 11/10/24  0632 24  0710 24  0752 24  1729 24  1618   WBC 2.96* 4.69 6.79  --  5.60 6.20 5.53 4.76  --  5.81   HEMOGLOBIN 8.0* 8.3* 8.1*  --  8.5* 8.5* 8.5* 7.7*  --  7.9*   HEMATOCRIT 24.9* 25.0* 24.9*  --  25.6* 26.4* 26.6* 24.1*  --  25.1*   PLATELETS 277 280 280  --  316 308 306 287  --  283   NEUTROS ABS 1.61*  --   --   --   --   --  3.36 2.94  --  3.48   IMMATURE GRANS (ABS) 0.01  --   --   --   --   --  0.01 0.01  --  0.02   LYMPHS ABS 0.80  --   --   --   --   --  1.48 1.22  --  1.62   MONOS ABS 0.49  --   --   --    --   --  0.53 0.49  --  0.55   EOS ABS 0.01  --   --   --   --   --  0.12 0.08  --  0.12   MCV 93.3 92.6 91.9  --  90.1 92.6 93.3 94.1  --  96.2   PROCALCITONIN  --   --   --  0.78*  --   --   --   --   --   --    PROTIME 14.0  --   --   --   --   --   --   --   --   --    HSTROP T  --   --   --   --   --   --   --   --  80* 84*         Lab 11/13/24 0815 11/12/24  0837 11/11/24  1047 11/10/24  0632 11/09/24  1202 11/09/24  0711 11/07/24  1729 11/07/24  1618   SODIUM 137 133* 135* 134* 132* 135*   < > 133*   POTASSIUM 4.4 4.5 4.1 5.0 4.8 5.1   < > 6.3*   CHLORIDE 100 98 100 100 100 101   < > 98   CO2 25.0 24.0 25.0 24.0 25.0 24.0   < > 26.0   ANION GAP 12.0 11.0 10.0 10.0 7.0 10.0   < > 9.0   BUN 39* 29* 25* 21 23 23   < > 42*   CREATININE 1.39* 1.06 1.00 0.90 0.95 0.91   < > 1.39*   EGFR 55.6* 76.9 82.5 93.6 87.7 92.4   < > 55.6*   GLUCOSE 112* 127* 149* 119* 193* 160*   < > 104*   CALCIUM 9.2 9.7 9.5 9.1 9.4 9.7   < > 9.9   MAGNESIUM  --   --  1.8 1.6  --  1.7  --  2.2   PHOSPHORUS  --   --   --  3.2  --  3.0  --   --    TSH  --   --   --   --   --   --   --  4.100    < > = values in this interval not displayed.         Lab 11/13/24 0815 11/12/24 0837 11/10/24  0632 11/07/24  1618   TOTAL PROTEIN 6.8 7.0 6.3 7.2   ALBUMIN 3.6 3.6 3.6 3.8   GLOBULIN 3.2  --  2.7 3.4   ALT (SGPT) 44* 20 16 20   AST (SGOT) 41* 17 14 14   BILIRUBIN <0.2 <0.2 <0.2 <0.2   BILIRUBIN DIRECT  --  <0.2  --   --    ALK PHOS 89 85 93 85         Lab 11/14/24  0333 11/07/24  1729 11/07/24  1618   HSTROP T  --  80* 84*   PROTIME 14.0  --   --    INR 1.07  --   --              Lab 11/07/24  2313 11/07/24  1729   IRON  --  57*  57*   IRON SATURATION (TSAT)  --  17*   TIBC  --  337   TRANSFERRIN  --  226   FERRITIN  --  443.70*   FOLATE 11.80  --    VITAMIN B 12 1,303*  --    ABO TYPING O  --    RH TYPING Positive  --    ANTIBODY SCREEN Negative  --          Brief Urine Lab Results  (Last result in the past 365 days)        Color   Clarity    Blood   Leuk Est   Nitrite   Protein   CREAT   Urine HCG        11/07/24 1806 Yellow   Clear   Negative   Negative   Negative   Negative                   Microbiology Results Abnormal       None            IR LUMBAR PUNCTURE DIAGNOSTIC    Result Date: 11/13/2024  IR LUMBAR PUNCTURE DIAGNOSTIC Date of Exam: 11/13/2024 1:45 PM EST Indication: AMS concern for infection.   Comparison: None available. Technique:  Procedure, risks, complications, and side effects of lumbar puncture were discussed and reviewed in detail with the patient including the possibility for bleeding, infection, needle damage to surrounding structures, and the potential for a spinal headache. The patient indicated understanding and consented to the procedure.  The patient was placed in the prone position on the table. The back was prepped and draped in a sterile fashion. 1% lidocaine was used as local anesthetic to the skin and subcutaneous tissues. Under fluoroscopic guidance a 22 g spinal needle was advanced  at the L2-L3 level to the CSF space. Approximately 8 cc of clear and colorless CSF was returned and collected in 4 numbered vials. Sample vials were labeled and sent to pathology for further analysis. The needle was removed. Fluoroscopic Time: 18 seconds Number of Images: 1 Findings: The patient tolerated the procedure well, and no immediate complications occurred.     Impression: Impression: Successful fluoroscopic guided lumbar puncture as above with no immediate complications. Report dictated by: Maggi Silverman PA-c  I have personally reviewed this case and agree with the findings above: Electronically Signed: Anjum Prado MD  11/13/2024 2:54 PM EST  Workstation ID: [id number]    MRI Brain Without Contrast    Result Date: 11/12/2024  MRI BRAIN WO CONTRAST Date of Exam: 11/12/2024 9:30 PM EST Indication: lethargy in setting of dementia.  Comparison: 4/29/2022 Technique:  Routine multiplanar/multisequence sequence images of the brain were  obtained without contrast administration. Findings: No acute infarct or abnormal restricted diffusion. There is no evidence of hemorrhage. No mass effect, edema or midline shift Mild periventricular and subcortical white matter T2/FLAIR hyperintensities, nonspecific but most likely represents chronic small vessel ischemic changes. No extra-axial fluid collection. The midline structures are unremarkable. Prominent ventricular system secondary to chronic parenchymal volume loss. The visualized flow voids are unremarkable. Status post bilateral left lens extraction. Stable postoperative changes noted within the right globe. Otherwise the orbits are unremarkable. The visualized paranasal sinuses and mastoid air cells are clear. The visualized soft tissues are unremarkable. No acute osseous abnormality.     Impression: Impression: No acute intracranial abnormality. Mild periventricular and subcortical T2/FLAIR hyperintensities are nonspecific but most likely represent chronic small vessel ischemic changes. Postoperative changes noted within the right globe. Electronically Signed: Ruben Medina DO  11/12/2024 9:59 PM EST  Workstation ID: [id number]    XR Chest 1 View    Result Date: 11/12/2024  XR CHEST 1 VW Date of Exam: 11/12/2024 1:04 PM EST Indication: borderline fever Comparison: Chest radiograph 11/7/2024. Findings: Cardiomediastinal silhouette is unchanged. No focal consolidation or overt pulmonary edema. No pleural effusion or pneumothorax. Osseous structures are unchanged.     Impression: Impression: No evidence of acute cardiopulmonary disease. Electronically Signed: Anjum Prado MD  11/12/2024 1:14 PM EST  Workstation ID: [id number]     Results for orders placed during the hospital encounter of 11/07/24    Adult Transthoracic Echo Limited W/ Cont if Necessary Per Protocol    Interpretation Summary    Left ventricular ejection fraction appears to be 56 - 60%.    Left ventricular wall thickness is consistent  with concentric hypertrophy.  moderate.  Strain is normal and in normal pattern.  GLS -24%    Left ventricular diastolic dysfunction is noted.    There is a trivial pericardial effusion.      Current medications:  Scheduled Meds:amLODIPine, 10 mg, Per G Tube, Q24H  [Held by provider] ARIPiprazole, 5 mg, Per PEG Tube, Daily  brimonidine, 1 drop, Both Eyes, TID  cholecalciferol, 400 Units, Per PEG Tube, Daily  enoxaparin, 40 mg, Subcutaneous, Nightly  FLUoxetine, 20 mg, Per PEG Tube, Daily  lansoprazole, 30 mg, Per PEG Tube, Q AM  palliative care oral rinse, 5 mL, Mouth/Throat, 4x Daily  [Held by provider] QUEtiapine, 25 mg, Per PEG Tube, Nightly  senna-docusate sodium, 2 tablet, Per PEG Tube, BID  sodium chloride, 10 mL, Intravenous, Q12H  terazosin, 5 mg, Per PEG Tube, Q12H  timolol, 1 drop, Both Eyes, BID  Valproic Acid, 150 mg, Per PEG Tube, Q12H      Continuous Infusions:   PRN Meds:.  acetaminophen    senna-docusate sodium **AND** polyethylene glycol **AND** [DISCONTINUED] bisacodyl **AND** bisacodyl    dextrose    dextrose    glucagon (human recombinant)    hydrOXYzine    ipratropium-albuterol    nitroglycerin    sodium chloride    sodium chloride    sodium chloride    Assessment & Plan   Assessment & Plan     Active Hospital Problems    Diagnosis  POA    **JAYY (acute kidney injury) [N17.9]  Yes    Generalized weakness [R53.1]  Unknown    Hyperkalemia [E87.5]  Unknown    Severe vascular dementia without behavioral disturbance, psychotic disturbance, mood disturbance, or anxiety [F01.C0]  Yes    Dementia [F03.90]  Yes    Anemia of chronic disease [D63.8]  Yes    Essential hypertension [I10]  Yes    Type 2 diabetes mellitus [E11.9]  Yes      Resolved Hospital Problems   No resolved problems to display.        Brief Hospital Course to date:  Omkar Nava is a 69 y.o. male w vascular dementia, dysphagia s/p PEG tube who presented from long-term care with weakness and family concern. Hypothermic w temperature 93.2  on arrival which resolved by AM, no infectious source discovered and cortisol stim test wnl.    Remains with increased somnolence, decreased voice volume, less conversational than baseline. No appreciated focal deficits from baseline. CT head wnl, EEG with dysfunction but no seizure activity. MRI also with small vessel dz without acute abnormality, LP 11/13 w WBC 1.  This morning, patient oriented to name and date of birth.  Tolerating p.o. intake with speech therapist this morning.     Acute somnolence  Baseline vascular dementia c/b psychosis  -see work-up as above, unremarkable to now. Off baseline significantly per d/w daughter as he is baseline very conversational, interactive w family  -some improvement with holding abilify and seroquel. D/w neurology who agree w continued hold  -Continue home Prozac, home Aricept.  Decrease Depakote to 150 mg twice daily today.     Hypothermia - resolved  -s/p complete infectious w/u which was unremarkable  -s/p cortisol stim which was appropriate  -currently normal temperature v borderline elevated on eval 11/12: repeat CXR also without PNA (at risk of aspiration), WBC# wnl, procal only mildly elevated which is nonspecific  -Continue to monitor     Hyperkalemic on admit, recurrent, resolved    - resolved w lokelma on admit but K>6.0. unclear etiology and trending down to normal  -of note, this is 3rd episode of hyperkalemia >6 on recent admissions w very mild AKIs (3/2024, 9/2024, now). Unclear etiology but recurrent nature more concerning  -Potassium 4.1 today     Relative hypotension in setting of chronic HTN   -home amlodipine and terazosin restarted but home clonidine, coreg, hydralazine on hold. Significant change from baseline needs in this respect  -cortisol stim wnl  -Repeat echo with an EF of 56 to 60% concentric hypertrophy, similar to echo obtained in January 2024  -Continue to hold home clonidine, Coreg, hydralazine     Bradycardia - improved - hold home  carvedilol, will likely need to d/c at discharge     Chronic anemia  Chronic aspiration - daytime modified diet, nocturnal TF  DMII, A1c 5.6 - hypoglycemic 2/2 insulin trx of hypokalemia on admit, now resolved  CHFpEF   Vascular dementia c/b psychosis - home valproic acid, fluoxetine. Abilify/seroquel on hold as above  Blindness      Expected Discharge Location and Transportation: [facility]  Expected Discharge   Expected Discharge Date: 11/15/2024; Expected Discharge Time:      VTE Prophylaxis:  Pharmacologic & mechanical VTE prophylaxis orders are present.         AM-PAC 6 Clicks Score (PT): 9 (11/14/24 0800)    CODE STATUS:   Code Status and Medical Interventions: CPR (Attempt to Resuscitate); Full Support   Ordered at: 11/07/24 2023     Level Of Support Discussed With:    Patient     Code Status (Patient has no pulse and is not breathing):    CPR (Attempt to Resuscitate)     Medical Interventions (Patient has pulse or is breathing):    Full Support       Ryne Burgos,   11/14/24

## 2024-11-14 NOTE — PLAN OF CARE
Goal Outcome Evaluation:  Plan of Care Reviewed With: patient        Progress: no change       Anticipated Discharge Disposition (SLP): skilled nursing facility             Treatment Assessment (SLP): continued, moderate, oral dysphagia, pharyngeal dysphagia, suspected, toleration of diet (11/14/24 0910)  Treatment Assessment Comments (SLP): Good appetite/interest in breakfast this morning. Does not appear clinically appropriate for repeat instrumental swallow study today. Will to continue to re-assess in tx. (11/14/24 0910)  Plan for Continued Treatment (SLP): continue treatment per plan of care (11/14/24 0910)

## 2024-11-14 NOTE — THERAPY TREATMENT NOTE
Acute Care - Speech Language Pathology   Swallow Treatment Note [facility]     Patient Name: Omkar Nava  : 1955  MRN: [id number]  Today's Date: 2024               Admit Date: 2024    Visit Dx:     ICD-10-CM ICD-9-CM   1. Acute kidney injury  N17.9 584.9   2. Hyperkalemia  E87.5 276.7   3. Generalized weakness  R53.1 780.79   4. History of diabetes mellitus  Z86.39 V12.29   5. Vascular dementia, unspecified dementia severity, unspecified whether behavioral, psychotic, or mood disturbance or anxiety  F01.50 290.40   6. 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, without long-term current use of insulin    Acute osteomyelitis of left foot    Type 2 diabetes mellitus    Essential hypertension    Psychotic disorder    Neurocognitive disorder    S/P transmetatarsal amputation of foot, left    Abscess of left foot    Anemia of chronic disease    Aspiration pneumonia    Cervical spine pain    Chronic kidney disease    Closed head injury without loss of consciousness    Dementia    Dental cavities    Diarrhea of presumed infectious origin    Displaced fracture of proximal phalanx of left great toe, initial encounter for open fracture    Dysphagia    Erectile dysfunction    Generalized muscle weakness    Hallucinations    Hypertensive heart and chronic kidney disease 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 failure, with stage 1 through stage 4 chronic kidney disease, or unspecified chronic kidney disease    Insomnia due to medical condition    Iron deficiency anemia    Laceration without foreign body, left foot, subsequent encounter    Personal history of Methicillin resistant Staphylococcus aureus infection    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    Protein calorie malnutrition    Simple chronic bronchitis    Tobacco use    Type 2 diabetes mellitus with  diabetic neuropathy, unspecified    Type 2 diabetes mellitus with diabetic chronic kidney disease    Acute type 1 respiratory failure    Severe vascular dementia without behavioral disturbance, psychotic disturbance, mood disturbance, or anxiety    JAYY (acute kidney injury)    Generalized weakness    Hyperkalemia     Past Medical History:   Diagnosis Date    Anemia     Dementia     Diabetes mellitus     Dysphagia     GERD (gastroesophageal reflux disease)     History of alcohol abuse     History of cocaine use     History of marijuana use     Hypertension     Osteomyelitis     Poor historian     records obtained from nursing home records & his family    Visual impairment      Past Surgical History:   Procedure Laterality Date    AMPUTATION FOOT Left 10/18/2022    Procedure: PARTIAL FIRST RAY AMPUTATION LEFT;  Surgeon: Yeison Petty MD;  Location:  [facility];  Service: Orthopedics;  Laterality: Left;    AMPUTATION FOOT Left 12/5/2022    Procedure: Transmetatarsal of Left Foot;  Surgeon: Yeison Petty MD;  Location:  [facility];  Service: Orthopedics;  Laterality: Left;    ENDOSCOPY N/A 3/14/2024    Procedure: ESOPHAGOGASTRODUODENOSCOPY WITH JEJUNAL TUBE INSERTION AT BEDSIDE;  Surgeon: Brunner, Mark I, MD;  Location:  [facility];  Service: Gastroenterology;  Laterality: N/A;    ENDOSCOPY W/ PEG TUBE PLACEMENT N/A 4/1/2024    Procedure: ESOPHAGOGASTRODUODENOSCOPY WITH PERCUTANEOUS ENDOSCOPIC GASTROSTOMY TUBE INSERTION;  Surgeon: Brunner, Mark I, MD;  Location:  [facility];  Service: Gastroenterology;  Laterality: N/A;  EGD with PEG placement.  Secured at 3.5 cm    EYE SURGERY         SLP Recommendation and Plan     SLP Diet Recommendation: mechanical ground textures, no mixed consistencies, honey thick liquids, ice chips between meals after oral care, with supervision, water between meals after oral care, with supervision, long term alternate methods of nutrition/hydration, other (see comments) (Single ice  chips/sips of H2O via tsp ok between meals, as tolerated. Concern that may have difficulty meeting nutritional needs solely PO, given severity of oral dysphagia. Consider cont'd use of g-tube, as appropriate per MD/RD discretion.) (11/14/24 0910)  Recommended Precautions and Strategies: upright posture during/after eating, small bites of food and sips of liquid, alternate between small bites of food and sips of liquid, 1:1 supervision, assist with feeding (slow pace, liquid via tsp or small/single straw sips only, coat ground items w/ gravy and/or follow w/ pureed bolus to assist w/ oral clearance) (11/14/24 0910)                 Anticipated Discharge Disposition (SLP): skilled nursing facility (11/14/24 0910)                    Daily Summary of Progress (SLP): progress toward functional goals as expected (11/14/24 0910)               Treatment Assessment (SLP): continued, moderate, oral dysphagia, pharyngeal dysphagia, suspected, toleration of diet (11/14/24 0910)  Treatment Assessment Comments (SLP): Good appetite/interest in breakfast this morning. Does not appear clinically appropriate for repeat instrumental swallow study today. Will to continue to re-assess in tx. (11/14/24 0910)  Plan for Continued Treatment (SLP): continue treatment per plan of care (11/14/24 0910)         Progress: no change      SWALLOW EVALUATION (Last 72 Hours)       SLP Adult Swallow Evaluation       Row Name 11/14/24 0910                   Rehab Evaluation    Document Type therapy note (daily note)  -AC        Subjective Information no complaints  -AC        Patient Observations alert;cooperative  -AC        Patient/Family/Caregiver Comments/Observations No family present.  -AC        Patient Effort good  -AC           Pain    Pretreatment Pain Rating 0/10 - no pain  -AC        Posttreatment Pain Rating 0/10 - no pain  -AC           SLP Treatment Clinical Impressions    Treatment Assessment (SLP) continued;moderate;oral  dysphagia;pharyngeal dysphagia;suspected;toleration of diet  -AC        Treatment Assessment Comments (SLP) Good appetite/interest in breakfast this morning. Does not appear clinically appropriate for repeat instrumental swallow study today. Will to continue to re-assess in tx.  -AC        Daily Summary of Progress (SLP) progress toward functional goals as expected  -AC        Barriers to Overall Progress (SLP) Cognitive status;Baseline deficits  -AC        Plan for Continued Treatment (SLP) continue treatment per plan of care  -AC        Care Plan Review evaluation/treatment results reviewed;care plan/treatment goals reviewed;risks/benefits reviewed;current/potential barriers reviewed;patient/other agree to care plan  -AC           Recommendations    SLP Diet Recommendation mechanical ground textures;no mixed consistencies;honey thick liquids;ice chips between meals after oral care, with supervision;water between meals after oral care, with supervision;long term alternate methods of nutrition/hydration;other (see comments)  Single ice chips/sips of H2O via tsp ok between meals, as tolerated. Concern that may have difficulty meeting nutritional needs solely PO, given severity of oral dysphagia. Consider cont'd use of g-tube, as appropriate per MD/RD discretion.  -AC        Recommended Precautions and Strategies upright posture during/after eating;small bites of food and sips of liquid;alternate between small bites of food and sips of liquid;1:1 supervision;assist with feeding  slow pace, liquid via tsp or small/single straw sips only, coat ground items w/ gravy and/or follow w/ pureed bolus to assist w/ oral clearance  -AC        Anticipated Discharge Disposition (SLP) skilled nursing facility  -                  User Key  (r) = Recorded By, (t) = Taken By, (c) = Cosigned By      Initials Name Effective Dates    AC Kimberlee Castillo MS [facility]-SLP 02/03/23 -                     EDUCATION  The patient has been educated  in the following areas:   Dysphagia (Swallowing Impairment) Modified Diet Instruction.        SLP GOALS       Row Name 11/14/24 0910             (LTG) Patient will demonstrate functional swallow for    Diet Texture (Demonstrate functional swallow) mechanical ground textures  -AC      Liquid viscosity (Demonstrate functional swallow) thin liquids  -AC      [place] (Demonstrate functional swallow) with 1:1 assist/ supervision  -AC      Time Frame (Demonstrate functional swallow) 1 week  -AC      Progress/Outcomes (Demonstrate functional swallow) continuing progress toward goal  -AC      Comment (Demonstrate functional swallow) Did not attempt swallowing exercises as pt interested in eating breakfast.  -AC         (STG) Patient will tolerate trials of    Consistencies Trialed (Tolerate trials) mechanical ground textures;honey/ moderately thick liquids  -AC      Desired Outcome (Tolerate trials) without signs/symptoms of aspiration;with adequate oral prep/transit/clearance  -AC      [place] (Tolerate trials) with 1:1 assist/ supervision  -AC      Time Frame (Tolerate trials) 1 week  -AC      Progress/Outcomes (Tolerate trials) good progress toward goal  -AC      Comment (Tolerate trials) Pt's breakfast tray at bedside and pt expressed interest in eating. Slow pace was needed, but pt accepted: ~4 oz honey-thick liquid via tsp/straw sips and ~4-5 oz puree and mechanical ground items from tray w/ 1:1 assist. Increased oral prep and transit time noted w/ all consistencies. Mild-moderate oral residue (greatest in L buccal space) w/ ground textures. Cleared w/ combination of pureed bolus, liquid wash, & cues to use lingual sweep. Throat clear x1 during feeding when pt reported feeling that food was sticking in throat. Alleviated w/ additional liquid wash.  -AC         (STG) Patient will tolerate therapeutic trials of    Consistencies Trialed (Tolerate therapeutic trials) thin liquids;nectar/ mildly thick liquids   -AC      Desired Outcome (Tolerate therapeutic trials) without signs/symptoms of aspiration;without signs of distress;with adequate oral prep/transit/clearance  silent aspiration per FEES--check for improved lingual control/residue/timing  -AC      [place] (Tolerate therapeutic trials) with 1:1 assist/ supervision  -AC      Time Frame (Tolerate therapeutic trials) 1 week  -AC      Progress/Outcomes (Tolerate therapeutic trials) continuing progress toward goal  -AC      Comment (Tolerate therapeutic trials) Trialed ice chips and thin via tsp. Continued spontaneous tendency to keep head hyperextended, despite cues and attempts to provide pillow support. Throat clearing, wet vocal quality 30% of trials. Continued clinical concern for poor lingual control/impaired timing.  -AC         (STG) Lingual Strengthening Goal 1 (SLP)    Increase Lingual Tone/Sensation/Control/Coordination/Movement lingual resistance exercises  -AC      Increase Tongue Back Strength lingual resistance exercises  -AC      [place]/Accuracy (Lingual Strengthening Goal 1, SLP) with maximum cues (25-49% accuracy)  -AC      Time Frame (Lingual Strengthening Goal 1, SLP) 1 week  -AC      Barriers (Lingual Strengthening Goal 1, SLP) Cognitive status--trial  -AC      Progress/Outcomes (Lingual Strengthening Goal 1, SLP) goal ongoing  -AC         (STG) Pharyngeal Strengthening Exercise Goal 1 (SLP)    Increase Timing prepping - 3 second prep or suck swallow or 3-step swallow  -AC      Increase Closure at Entrance to Airway/Closure of Airway at Glottis supraglottic swallow  -AC      [place]/Accuracy (Pharyngeal Strengthening Goal 1, SLP) with maximum cues (25-49% accuracy)  -AC      Time Frame (Pharyngeal Strengthening Goal 1, SLP) 1 week  -AC      Barriers (Pharyngeal Strengthening Goal 1, SLP) Cognitive status--trial  -AC      Progress/Outcomes (Pharyngeal Strengthening Goal 1, SLP) goal ongoing  -AC                User Key  (r) = Recorded  By, (t) = Taken By, (c) = Cosigned By      Initials Name Provider Type    Kimberlee Pastor MS CCC-SLP Speech and Language Pathologist                         Time Calculation:    Time Calculation- SLP       Row Name 11/14/24 1050             Time Calculation- SLP    SLP Start Time 0910  -AC      SLP Received On 11/14/24  -AC         Untimed Charges    27564-ZI Treatment Swallow Minutes 58  -AC         Total Minutes    Untimed Charges Total Minutes 58  -AC       Total Minutes 58  -AC                User Key  (r) = Recorded By, (t) = Taken By, (c) = Cosigned By      Initials Name Provider Type    Kimberlee Pastor MS CCC-SLP Speech and Language Pathologist                    Therapy Charges for Today       Code Description Service Date Service Provider Modifiers Qty    [id number] HC ST TREATMENT SWALLOW 4 11/14/2024 Kimberlee Castillo MS CCC-SLP GN, KX 1                 MS JIM Silveira  11/14/2024

## 2024-11-14 NOTE — PLAN OF CARE
Goal Outcome Evaluation:   Patient had no acute events during shift  Tube feed [phone]  RA, SB, disoriented x4

## 2024-11-14 NOTE — PROGRESS NOTES
[facility]    Progress Note    Patient Name: Omkar Nava  : 1955  MRN: [id number]  Primary Care Physician:  Alberto Dye MD  Date of admission: 2024    Subjective     Chief Complaint: Altered mental status    History of Present Illness   Patient seen resting comfortably in bed patient able state his name and birthday.  He was able to state 26 when asked to current year.  He was disoriented to location as he thought he was in [facility].  overall mentation seems to have improved from yesterday.    Review of Systems   General: Negative for fever, nausea, or vomiting.   Neurological: Negative for headache, pain, or weakness.     Objective     Physical Exam  Vitals and nursing note reviewed.   Constitutional:       General: He is not in acute distress.     Appearance: He is not ill-appearing.   Eyes:      Extraocular Movements: Extraocular movements intact.      Pupils: Pupils are equal, round, and reactive to light.      Comments: No nystagmus or deviated gaze noted   Neurological:      Mental Status: He is alert. Mental status is at baseline.      Cranial Nerves: No cranial nerve deficit or facial asymmetry.      Sensory: No sensory deficit.      Motor: Weakness present. No tremor or seizure activity.      Comments:     Cranial Nerves   CN II: Pupils are equal, round, and reactive to light. Normal visual acuity and visual fields.    CN III IV VI: Extraocular movements are full without nystagmus.  CN V: Normal facial sensation and strength of muscles of mastication.  CN VII: Facial movements are symmetric. No weakness.  CN VIII:  Auditory acuity is normal.  CN IX & X:  Symmetric palatal movement.  CN XII: The tongue is midline.  No atrophy or fasciculations.            Vitals:   Temp:  [97.3 °F (36.3 °C)-98.5 °F (36.9 °C)] 98.1 °F (36.7 °C)  Heart Rate:  [57-64] 57  Resp:  [18] 18  BP: (105-156)/(47-62) 147/62    Current Medications    Current Facility-Administered Medications:      acetaminophen (TYLENOL) 160 MG/5ML oral solution 650 mg, 650 mg, Per PEG Tube, Q6H PRN, Eriberto, Norma, APRN    amLODIPine (NORVASC) tablet 10 mg, 10 mg, Per G Tube, Q24H, Jeanie Calixto MD, 10 mg at 11/14/24 0828    [Held by provider] ARIPiprazole (ABILIFY) tablet 5 mg, 5 mg, Per PEG Tube, Daily, Eriberto, Norma, APRN, 5 mg at 11/12/24 0811    sennosides-docusate (PERICOLACE) 8.6-50 MG per tablet 2 tablet, 2 tablet, Per PEG Tube, BID, 2 tablet at 11/13/24 2247 **AND** polyethylene glycol (MIRALAX) packet 17 g, 17 g, Per PEG Tube, Daily PRN **AND** [DISCONTINUED] bisacodyl (DULCOLAX) EC tablet 5 mg, 5 mg, Oral, Daily PRN **AND** bisacodyl (DULCOLAX) suppository 10 mg, 10 mg, Rectal, Daily PRN, Eriberto, Norma, APRN    brimonidine (ALPHAGAN) 0.2 % ophthalmic solution 1 drop, 1 drop, Both Eyes, TID, Eriberto, Norma, APRN, 1 drop at 11/14/24 0828    cholecalciferol 10 MCG/ML (400 units/mL) oral liquid 400 Units, 400 Units, Per PEG Tube, Daily, Eriberto, Norma, APRN, 400 Units at 11/14/24 0828    dextrose (D50W) (25 g/50 mL) IV injection 25 g, 25 g, Intravenous, Q15 Min PRN, Jeanie Calixto MD    dextrose (GLUTOSE) oral gel 15 g, 15 g, Oral, Q15 Min PRN, Jeanie Calixto MD    Enoxaparin Sodium (LOVENOX) syringe 40 mg, 40 mg, Subcutaneous, Nightly, Coral aVlle MD, 40 mg at 11/13/24 2248    FLUoxetine (PROzac) 20 MG/5ML solution 20 mg, 20 mg, Per PEG Tube, Daily, Eriberto, Norma, APRN, 20 mg at 11/14/24 0828    glucagon (GLUCAGEN) injection 1 mg, 1 mg, Intramuscular, Q15 Min PRN, Jeanie Calixto MD    hydrOXYzine (ATARAX) tablet 25 mg, 25 mg, Per PEG Tube, TID PRN, Norma Wei APRN, 25 mg at 11/10/24 2031    ipratropium-albuterol (DUO-NEB) nebulizer solution 3 mL, 3 mL, Nebulization, Q4H PRN, Norma Wei APRN    lansoprazole (PREVACID SOLUTAB) disintegrating tablet Tablet Delayed Release Dispersible 30 mg, 30 mg, Per PEG Tube, Q AM,  "Eriberto, Norma, APRN, 30 mg at 11/14/24 0550    nitroglycerin (NITROSTAT) SL tablet 0.4 mg, 0.4 mg, Sublingual, Q5 Min PRN, Eriberto, Norma, APRN    palliative care oral rinse, 5 mL, Mouth/Throat, 4x Daily, Eriberto, Norma, APRN, 5 mL at 11/13/24 2249    [Held by provider] QUEtiapine (SEROquel) tablet 25 mg, 25 mg, Per PEG Tube, Nightly, Eriberto, Norma, APRN, 25 mg at 11/11/24 2137    sodium chloride 0.9 % flush 10 mL, 10 mL, Intravenous, PRN, John Jade MD    sodium chloride 0.9 % flush 10 mL, 10 mL, Intravenous, Q12H, Eriberto, Norma, APRN, 10 mL at 11/13/24 2249    sodium chloride 0.9 % flush 10 mL, 10 mL, Intravenous, PRN, Eriberto, Norma, APRN, 10 mL at 11/07/24 2319    sodium chloride 0.9 % infusion 40 mL, 40 mL, Intravenous, PRN, Eriberto, Norma, APRN    terazosin (HYTRIN) capsule 5 mg, 5 mg, Per PEG Tube, Q12H, Eriberto, Norma, APRN, 5 mg at 11/14/24 0828    timolol (TIMOPTIC) 0.5 % ophthalmic solution 1 drop, 1 drop, Both Eyes, BID, Eriberto, Norma, APRN, 1 drop at 11/14/24 0828    Valproic Acid (DEPAKENE) syrup 150 mg, 150 mg, Per PEG Tube, Q8H, Eriberto, Norma, APRN, 150 mg at 11/13/24 2246    Laboratory Results:   Lab Results   Component Value Date    GLUCOSE 112 (H) 11/13/2024    CALCIUM 9.2 11/13/2024     11/13/2024    K 4.4 11/13/2024    CO2 25.0 11/13/2024     11/13/2024    BUN 39 (H) 11/13/2024    CREATININE 1.39 (H) 11/13/2024    EGFRIFAFRI >60 07/25/2022    EGFRIFNONA >60 07/25/2022    BCR 28.1 (H) 11/13/2024    ANIONGAP 12.0 11/13/2024     Lab Results   Component Value Date    WBC 2.96 (L) 11/14/2024    HGB 8.0 (L) 11/14/2024    HCT 24.9 (L) 11/14/2024    MCV 93.3 11/14/2024     11/14/2024     No results found for: \"CHOL\"  Lab Results   Component Value Date    HDL 44 06/25/2022     Lab Results   Component Value Date    .4 (H) 06/25/2022     Lab Results   Component Value Date    TRIG 32 04/01/2024    TRIG 106 03/25/2024    TRIG 58 06/25/2022     Lab " Results   Component Value Date    HGBA1C 5.60 09/15/2024     Lab Results   Component Value Date    INR 1.07 11/14/2024    INR 1.05 03/29/2024    INR 1.38 (H) 03/12/2024    PROTIME 14.0 11/14/2024    PROTIME 13.8 03/29/2024    PROTIME 17.1 (H) 03/12/2024     Lab Results   Component Value Date    FOLATE 11.80 11/07/2024     Lab Results   Component Value Date    GMZAEIPI52 1,303 (H) 11/07/2024       MRI Brain Without Contrast    Result Date: 11/12/2024  MRI BRAIN WO CONTRAST Date of Exam: 11/12/2024 9:30 PM EST Indication: lethargy in setting of dementia.  Comparison: 4/29/2022 Technique:  Routine multiplanar/multisequence sequence images of the brain were obtained without contrast administration. Findings: No acute infarct or abnormal restricted diffusion. There is no evidence of hemorrhage. No mass effect, edema or midline shift Mild periventricular and subcortical white matter T2/FLAIR hyperintensities, nonspecific but most likely represents chronic small vessel ischemic changes. No extra-axial fluid collection. The midline structures are unremarkable. Prominent ventricular system secondary to chronic parenchymal volume loss. The visualized flow voids are unremarkable. Status post bilateral left lens extraction. Stable postoperative changes noted within the right globe. Otherwise the orbits are unremarkable. The visualized paranasal sinuses and mastoid air cells are clear. The visualized soft tissues are unremarkable. No acute osseous abnormality.     Impression: Impression: No acute intracranial abnormality. Mild periventricular and subcortical T2/FLAIR hyperintensities are nonspecific but most likely represent chronic small vessel ischemic changes. Postoperative changes noted within the right globe. Electronically Signed: Ruben Medina DO  11/12/2024 9:59 PM EST  Workstation ID: [id number]       Latest Reference Range & Units 11/13/24 14:25   Supernatant Color, CSF Colorless   Colorless  Colorless  Colorless  "  Appearance, CSF Clear   Clear  Clear  Clear   Color, CSF Colorless   Colorless  Colorless  Colorless   Tube Number, CSF  4  1   Volume, CSF mL  mL 8.0  8.0   WBC, CSF 0 - 5 /mm3  0 - 5 /mm3 1  1   RBC, CSF 0 - 5 /mm3  0 - 5 /mm3 1  15 (H)   Glucose, CSF 40 - 70 mg/dL 65   Protein, Total (CSF) 15.0 - 45.0 mg/dL 158.0 (H)   (H): Data is abnormally high    Assessment / Plan   Brief Patient Summary:  Omkar Nava is a 69 y.o. male with a past medical history significant for HTN, T2DM, CKD, vascular dementia, dysphagia s/p PEG and chronic iron deficiency anemia presented to [facility] on 11/7/2024 with complaint of generalized weakness.  Per patient's daughter she presented him at his nursing facility and he was \"not himself\".      Plan:   Vascular dementia  Altered mental status-improving  Encephalopathy  MRI brain no acute abnormalities  EEG with nonspecific moderate diffuse cerebral dysfunction.  No epileptic activity.  Continue home Abilify 5 mg daily (on hold)   Continue home Prozac 20 mg daily  Continue home Seroquel 5 mg daily (on hold)   Continue home Aricept 5 mg nightly  Decrease Depakote to 150 mg twice daily.  Patient is on this medication for behavioral reasons.  True trough VPA in a.m. 22.7  CSF analysis unconcerning for infection, WBCs 1.  Meningitis panel and cryptococcal negative.  Culture with no growth.  AFB, West Nile, Lyme, VDRL pending  General neurology will continue to follow    I have discussed the above with the patient, bedside RN and Dr. Burgos  Time spent with patient: 50 minutes in face-to-face evaluation and management of the patient.    Copied text in this note has been reviewed and is accurate as of 11/14/24.     Mila Crews, NARGIS            "

## 2024-11-14 NOTE — PROGRESS NOTES
[facility] Medicine Services  PROGRESS NOTE    Patient Name: Omkar Nava  : 1955  MRN: [id number]    Date of Admission: 2024  Primary Care Physician: Alberto Dye MD    Subjective   Subjective     CC: weakness f/u    HPI:  Speech is a little louder today, toward understandable. Still more somnolent than prior    Objective   Objective     Vital Signs:   Temp:  [97.3 °F (36.3 °C)-99.1 °F (37.3 °C)] 97.3 °F (36.3 °C)  Heart Rate:  [57-64] 57  Resp:  [18] 18  BP: (105-143)/(47-70) 108/48     Physical Exam:  Constitutional: No acute distress, tired appearing lying in bed  Respiratory: Clear to auscultation bilaterally, respiratory effort normal   Cardiovascular: RRR, no murmurs, rubs, or gallops  Gastrointestinal: Soft, nontender, nondistended  Musculoskeletal: Muscle tone significantly decreased throughout, no joint effusions appreciated  Psychiatric: Flat affect  Neurologic: Soft speech but improved from yesterday, much more understandable as day progresses. Can follow commands w repeated asking  Skin: No rashes    Results Reviewed:  LAB RESULTS:      Lab 24  0815 24  1547 24  0837 24  1047 11/10/24  0632 24  0710 24  0752 24  1729 24  1618   WBC 4.69 6.79  --  5.60 6.20 5.53 4.76  --  5.81   HEMOGLOBIN 8.3* 8.1*  --  8.5* 8.5* 8.5* 7.7*  --  7.9*   HEMATOCRIT 25.0* 24.9*  --  25.6* 26.4* 26.6* 24.1*  --  25.1*   PLATELETS 280 280  --  316 308 306 287  --  283   NEUTROS ABS  --   --   --   --   --  3.36 2.94  --  3.48   IMMATURE GRANS (ABS)  --   --   --   --   --  0.01 0.01  --  0.02   LYMPHS ABS  --   --   --   --   --  1.48 1.22  --  1.62   MONOS ABS  --   --   --   --   --  0.53 0.49  --  0.55   EOS ABS  --   --   --   --   --  0.12 0.08  --  0.12   MCV 92.6 91.9  --  90.1 92.6 93.3 94.1  --  96.2   PROCALCITONIN  --   --  0.78*  --   --   --   --   --   --    HSTROP T  --   --   --   --   --   --   --  80* 84*          Lab 11/13/24  0815 11/12/24  0837 11/11/24  1047 11/10/24  0632 11/09/24  1202 11/09/24  0711 11/07/24  1729 11/07/24  1618   SODIUM 137 133* 135* 134* 132* 135*   < > 133*   POTASSIUM 4.4 4.5 4.1 5.0 4.8 5.1   < > 6.3*   CHLORIDE 100 98 100 100 100 101   < > 98   CO2 25.0 24.0 25.0 24.0 25.0 24.0   < > 26.0   ANION GAP 12.0 11.0 10.0 10.0 7.0 10.0   < > 9.0   BUN 39* 29* 25* 21 23 23   < > 42*   CREATININE 1.39* 1.06 1.00 0.90 0.95 0.91   < > 1.39*   EGFR 55.6* 76.9 82.5 93.6 87.7 92.4   < > 55.6*   GLUCOSE 112* 127* 149* 119* 193* 160*   < > 104*   CALCIUM 9.2 9.7 9.5 9.1 9.4 9.7   < > 9.9   MAGNESIUM  --   --  1.8 1.6  --  1.7  --  2.2   PHOSPHORUS  --   --   --  3.2  --  3.0  --   --    TSH  --   --   --   --   --   --   --  4.100    < > = values in this interval not displayed.         Lab 11/13/24  0815 11/12/24  0837 11/10/24  0632 11/07/24  1618   TOTAL PROTEIN 6.8 7.0 6.3 7.2   ALBUMIN 3.6 3.6 3.6 3.8   GLOBULIN 3.2  --  2.7 3.4   ALT (SGPT) 44* 20 16 20   AST (SGOT) 41* 17 14 14   BILIRUBIN <0.2 <0.2 <0.2 <0.2   BILIRUBIN DIRECT  --  <0.2  --   --    ALK PHOS 89 85 93 85         Lab 11/07/24  1729 11/07/24  1618   HSTROP T 80* 84*             Lab 11/07/24  2313 11/07/24  1729   IRON  --  57*  57*   IRON SATURATION (TSAT)  --  17*   TIBC  --  337   TRANSFERRIN  --  226   FERRITIN  --  443.70*   FOLATE 11.80  --    VITAMIN B 12 1,303*  --    ABO TYPING O  --    RH TYPING Positive  --    ANTIBODY SCREEN Negative  --          Brief Urine Lab Results  (Last result in the past 365 days)        Color   Clarity   Blood   Leuk Est   Nitrite   Protein   CREAT   Urine HCG        11/07/24 1806 Yellow   Clear   Negative   Negative   Negative   Negative                   Microbiology Results Abnormal       None            IR LUMBAR PUNCTURE DIAGNOSTIC    Result Date: 11/13/2024  IR LUMBAR PUNCTURE DIAGNOSTIC Date of Exam: 11/13/2024 1:45 PM EST Indication: AMS concern for infection.   Comparison: None available.  Technique:  Procedure, risks, complications, and side effects of lumbar puncture were discussed and reviewed in detail with the patient including the possibility for bleeding, infection, needle damage to surrounding structures, and the potential for a spinal headache. The patient indicated understanding and consented to the procedure.  The patient was placed in the prone position on the table. The back was prepped and draped in a sterile fashion. 1% lidocaine was used as local anesthetic to the skin and subcutaneous tissues. Under fluoroscopic guidance a 22 g spinal needle was advanced  at the L2-L3 level to the CSF space. Approximately 8 cc of clear and colorless CSF was returned and collected in 4 numbered vials. Sample vials were labeled and sent to pathology for further analysis. The needle was removed. Fluoroscopic Time: 18 seconds Number of Images: 1 Findings: The patient tolerated the procedure well, and no immediate complications occurred.     Impression: Impression: Successful fluoroscopic guided lumbar puncture as above with no immediate complications. Report dictated by: Maggi Silverman PA-c  I have personally reviewed this case and agree with the findings above: Electronically Signed: Anjum Prado MD  11/13/2024 2:54 PM EST  Workstation ID: [id number]    MRI Brain Without Contrast    Result Date: 11/12/2024  MRI BRAIN WO CONTRAST Date of Exam: 11/12/2024 9:30 PM EST Indication: lethargy in setting of dementia.  Comparison: 4/29/2022 Technique:  Routine multiplanar/multisequence sequence images of the brain were obtained without contrast administration. Findings: No acute infarct or abnormal restricted diffusion. There is no evidence of hemorrhage. No mass effect, edema or midline shift Mild periventricular and subcortical white matter T2/FLAIR hyperintensities, nonspecific but most likely represents chronic small vessel ischemic changes. No extra-axial fluid collection. The midline structures are  unremarkable. Prominent ventricular system secondary to chronic parenchymal volume loss. The visualized flow voids are unremarkable. Status post bilateral left lens extraction. Stable postoperative changes noted within the right globe. Otherwise the orbits are unremarkable. The visualized paranasal sinuses and mastoid air cells are clear. The visualized soft tissues are unremarkable. No acute osseous abnormality.     Impression: Impression: No acute intracranial abnormality. Mild periventricular and subcortical T2/FLAIR hyperintensities are nonspecific but most likely represent chronic small vessel ischemic changes. Postoperative changes noted within the right globe. Electronically Signed: Ruben Medina DO  11/12/2024 9:59 PM EST  Workstation ID: [id number]    EEG    Result Date: 11/12/2024  Reason for referral: 69 y.o.male with altered mental status, history of previous seizure Technical Summary:  A 19 channel digital EEG was performed using the international 10-20 placement system, including eye leads and EKG leads. Duration: 21 minutes Findings: The patient is awake and mumbling.  Diffuse medium amplitude 2-6 Hz intermixed delta and theta activity is seen symmetrically over both hemispheres.  There is an overlay of EMG artifact which obscures portions of the background.  Sleep is not clearly seen.  Photic stimulation does not change the background.  Hyperventilation is not performed.  No focal features or epileptiform activity are seen. Video: Available Technical quality: Good SUMMARY: Moderate generalized slow No focal features or epileptiform activity are seen     Impression: Diffuse cerebral dysfunction of moderate degree but nonspecific No ongoing seizures are seen This report is transcribed using the Dragon dictation system.  =    XR Chest 1 View    Result Date: 11/12/2024  XR CHEST 1 VW Date of Exam: 11/12/2024 1:04 PM EST Indication: borderline fever Comparison: Chest radiograph 11/7/2024. Findings:  Cardiomediastinal silhouette is unchanged. No focal consolidation or overt pulmonary edema. No pleural effusion or pneumothorax. Osseous structures are unchanged.     Impression: Impression: No evidence of acute cardiopulmonary disease. Electronically Signed: Anjum Prado MD  11/12/2024 1:14 PM EST  Workstation ID: [id number]     Results for orders placed during the hospital encounter of 11/07/24    Adult Transthoracic Echo Limited W/ Cont if Necessary Per Protocol    Interpretation Summary    Left ventricular ejection fraction appears to be 56 - 60%.    Left ventricular wall thickness is consistent with concentric hypertrophy.  moderate.  Strain is normal and in normal pattern.  GLS -24%    Left ventricular diastolic dysfunction is noted.    There is a trivial pericardial effusion.      Current medications:  Scheduled Meds:amLODIPine, 10 mg, Per G Tube, Q24H  [Held by provider] ARIPiprazole, 5 mg, Per PEG Tube, Daily  brimonidine, 1 drop, Both Eyes, TID  cholecalciferol, 400 Units, Per PEG Tube, Daily  enoxaparin, 40 mg, Subcutaneous, Nightly  FLUoxetine, 20 mg, Per PEG Tube, Daily  lansoprazole, 30 mg, Per PEG Tube, Q AM  palliative care oral rinse, 5 mL, Mouth/Throat, 4x Daily  [Held by provider] QUEtiapine, 25 mg, Per PEG Tube, Nightly  senna-docusate sodium, 2 tablet, Per PEG Tube, BID  sodium chloride, 10 mL, Intravenous, Q12H  terazosin, 5 mg, Per PEG Tube, Q12H  timolol, 1 drop, Both Eyes, BID  Valproic Acid, 150 mg, Per PEG Tube, Q8H      Continuous Infusions:   PRN Meds:.  acetaminophen    senna-docusate sodium **AND** polyethylene glycol **AND** [DISCONTINUED] bisacodyl **AND** bisacodyl    dextrose    dextrose    glucagon (human recombinant)    hydrOXYzine    ipratropium-albuterol    nitroglycerin    sodium chloride    sodium chloride    sodium chloride    Assessment & Plan   Assessment & Plan     Active Hospital Problems    Diagnosis  POA    **JAYY (acute kidney injury) [N17.9]  Yes    Generalized  weakness [R53.1]  Unknown    Hyperkalemia [E87.5]  Unknown    Severe vascular dementia without behavioral disturbance, psychotic disturbance, mood disturbance, or anxiety [F01.C0]  Yes    Dementia [F03.90]  Yes    Anemia of chronic disease [D63.8]  Yes    Essential hypertension [I10]  Yes    Type 2 diabetes mellitus [E11.9]  Yes      Resolved Hospital Problems   No resolved problems to display.        Brief Hospital Course to date:  Omkar Nava is a 69 y.o. male w vascular dementia, dysphagia s/p PEG tube who presented from long-term care with weakness and family concern. Hypothermic w temperature 93.2 on arrival which resolved by AM, no infectious source discovered and cortisol stim test wnl.  Remains with increased somnolence, decreased voice volume, less conversational than baseline. No appreciated focal deficits from baseline. CT head wnl, EEG with dysfunction but no seizure activity. MRI also with small vessel dz without acute abnormality, LP 11/13 w WBC 1.    Acute somnolence  Baseline vascular dementia c/b psychosis  -see work-up as above, unremarkable to now. Off baseline significantly per d/w daughter as he is baseline very conversational, interactive w family  -some improvement with holding abilify and seroquel. D/w neurology who agree w continued hold  -continue VPA w neurology obtaining trough in AM for reassessment and may need to decrease    Hypothermia - resolved  -s/p complete infectious w/u which was unremarkable  -s/p cortisol stim which was appropriate  -currently normal temperature v borderline elevated on eval 11/12: repeat CXR also without PNA (at risk of aspiration), WBC# wnl, procal only mildly elevated which is nonspecific      Hyperkalemic on admit, recurrent   - resolved w lokelma on admit but K>6.0. unclear etiology and trending down to normal  -of note, this is 3rd episode of hyperkalemia >6 on recent admissions w very mild AKIs (3/2024, 9/2024, now). Periodic paralysis? Unclear  etiology but recurrent nature more concerning    Relative hypotension in setting of chronic HTN   -home amlodipine and terazosin restarted but home clonidine etc on hold. Significant change from baseline needs in this respect  -cortisol stim wnl  -obtain repeat lmtedECHO to ensure no significant change to EF    Bradycardia - improved - hold home carvedilol, will likely need to d/c at discharge    Chronic anemia  Chronic aspiration - daytime modified diet, nocturnal TF  DMII, A1c 5.6 - hypoglycemic 2/2 insulin trx of hypokalemia on admit, now resolved  CHFpEF   Vascular dementia c/b psychosis - home valproic acid, fluoxetine. Abilify/seroquel on hold as above  Blindness    Expected Discharge Location and Transportation: long-term care  Expected Discharge 11/14 (Discharge date is tentative pending patient's medical condition and is subject to change)  Expected Discharge Date: 11/15/2024; Expected Discharge Time:      VTE Prophylaxis:  Pharmacologic & mechanical VTE prophylaxis orders are present.         AM-PAC 6 Clicks Score (PT): 9 (11/13/24 1142)    CODE STATUS:   Code Status and Medical Interventions: CPR (Attempt to Resuscitate); Full Support   Ordered at: 11/07/24 2023     Level Of Support Discussed With:    Patient     Code Status (Patient has no pulse and is not breathing):    CPR (Attempt to Resuscitate)     Medical Interventions (Patient has pulse or is breathing):    Full Support       Coral Valle MD  11/13/24

## 2024-11-14 NOTE — CASE MANAGEMENT/SOCIAL WORK
Continued Stay Note  [facility]     Patient Name: Omkar Nava  MRN: [id number]  Today's Date: 11/14/2024    Admit Date: 11/7/2024    Plan: [facility]   Discharge Plan       Row Name 11/14/24 1415       Plan    Plan Comments Per multidisciplinary rounds, pt may be rady for discharge back to [facility] tomorrow. MSW called and updated Mariela with [facility]. MSW also called and updated pt's daughter Idalia. Idalia reports that she will be at [facility] this evening as well to check on her father. MSW requested ambulance for tomorrow afternoon in case pt is redy tomorrow for return to [facility]. At this time, pt is scheduled for ambulance tomorrow at 1500.                   Discharge Codes    No documentation.                 Expected Discharge Date and Time       Expected Discharge Date Expected Discharge Time    Nov 15, 2024               LUCINDA Maya

## 2024-11-14 NOTE — PLAN OF CARE
Problem: Adult Inpatient Plan of Care  Goal: Absence of Hospital-Acquired Illness or Injury  Intervention: Identify and Manage Fall Risk  Recent Flowsheet Documentation  Taken 11/14/2024 1800 by Jeanne Kelly RN  Safety Promotion/Fall Prevention:   activity supervised   assistive device/personal items within reach   clutter free environment maintained   lighting adjusted   room organization consistent   safety round/check completed  Taken 11/14/2024 1600 by Jeanne Kelly RN  Safety Promotion/Fall Prevention:   activity supervised   assistive device/personal items within reach   clutter free environment maintained   lighting adjusted   room organization consistent   safety round/check completed  Taken 11/14/2024 1400 by Jeanne Kelly RN  Safety Promotion/Fall Prevention:   activity supervised   assistive device/personal items within reach   clutter free environment maintained   lighting adjusted   room organization consistent   safety round/check completed  Taken 11/14/2024 1200 by Jeanne Kelly RN  Safety Promotion/Fall Prevention:   activity supervised   assistive device/personal items within reach   clutter free environment maintained   lighting adjusted   room organization consistent   safety round/check completed  Taken 11/14/2024 1000 by Jeanne Kelly RN  Safety Promotion/Fall Prevention:   activity supervised   assistive device/personal items within reach   clutter free environment maintained   lighting adjusted   room organization consistent   safety round/check completed  Taken 11/14/2024 0800 by Jeanne Kelly RN  Safety Promotion/Fall Prevention:   activity supervised   clutter free environment maintained   assistive device/personal items within reach   lighting adjusted   safety round/check completed   room organization consistent     Problem: Adult Inpatient Plan of Care  Goal: Absence of Hospital-Acquired Illness or Injury  Intervention: Prevent Skin Injury  Recent Flowsheet  Documentation  Taken 11/14/2024 1800 by Jeanne Kelly RN  Body Position: supine  Skin Protection: incontinence pads utilized  Taken 11/14/2024 1600 by Jeanne Kelly RN  Body Position: right  Skin Protection: incontinence pads utilized  Taken 11/14/2024 1400 by Jeanne Kelly RN  Body Position: left  Skin Protection: incontinence pads utilized  Taken 11/14/2024 1200 by Jeanne Kelly RN  Body Position: supine  Skin Protection: incontinence pads utilized  Taken 11/14/2024 1000 by Jeanne Kelly RN  Body Position: right  Skin Protection: incontinence pads utilized  Taken 11/14/2024 0800 by Jeanne Kelly RN  Body Position: left  Skin Protection: incontinence pads utilized     Problem: Skin Injury Risk Increased  Goal: Skin Health and Integrity  Intervention: Optimize Skin Protection  Recent Flowsheet Documentation  Taken 11/14/2024 1800 by Jeanne Kelly RN  Activity Management: activity encouraged  Pressure Reduction Techniques:   frequent weight shift encouraged   heels elevated off bed   pressure points protected   weight shift assistance provided  Head of Bed (HOB) Positioning: [facility] [place] elevated  Pressure Reduction Devices: pressure-redistributing mattress utilized  Skin Protection: incontinence pads utilized  Taken 11/14/2024 1600 by Jeanne Kelly RN  Activity Management: activity encouraged  Pressure Reduction Techniques:   frequent weight shift encouraged   heels elevated off bed   pressure points protected   weight shift assistance provided  Head of Bed (HOB) Positioning: [facility] [place] elevated  Pressure Reduction Devices: pressure-redistributing mattress utilized  Skin Protection: incontinence pads utilized  Taken 11/14/2024 1400 by Jeanne Kelly RN  Activity Management: activity encouraged  Pressure Reduction Techniques:   frequent weight shift encouraged   heels elevated off bed   pressure points protected   weight shift assistance provided  Head of Bed (HOB) Positioning: [facility] [place]  elevated  Pressure Reduction Devices: pressure-redistributing mattress utilized  Skin Protection: incontinence pads utilized  Taken 11/14/2024 1200 by Jeanne Kelly RN  Activity Management: activity encouraged  Pressure Reduction Techniques:   frequent weight shift encouraged   heels elevated off bed   pressure points protected   weight shift assistance provided  Head of Bed (HOB) Positioning: hospitals elevated  Pressure Reduction Devices: pressure-redistributing mattress utilized  Skin Protection: incontinence pads utilized  Taken 11/14/2024 1000 by Jeanne Kelly RN  Activity Management: activity encouraged  Pressure Reduction Techniques:   frequent weight shift encouraged   heels elevated off bed   pressure points protected   weight shift assistance provided  Head of Bed (HOB) Positioning: hospitals elevated  Pressure Reduction Devices: pressure-redistributing mattress utilized  Skin Protection: incontinence pads utilized  Taken 11/14/2024 0800 by Jeanne Kelly RN  Activity Management: activity encouraged  Pressure Reduction Techniques:   frequent weight shift encouraged   heels elevated off bed   pressure points protected   weight shift assistance provided  Head of Bed (HOB) Positioning: hospitals elevated  Pressure Reduction Devices: pressure-redistributing mattress utilized  Skin Protection: incontinence pads utilized   Goal Outcome Evaluation:

## 2024-11-15 ENCOUNTER — APPOINTMENT (OUTPATIENT)
Dept: OTHER | Facility: HOSPITAL | Age: 69
End: 2024-11-15
Payer: MEDICARE

## 2024-11-15 VITALS
DIASTOLIC BLOOD PRESSURE: 62 MMHG | WEIGHT: 177.3 LBS | HEIGHT: 68 IN | TEMPERATURE: 97.7 F | SYSTOLIC BLOOD PRESSURE: 153 MMHG | OXYGEN SATURATION: 100 % | HEART RATE: 51 BPM | RESPIRATION RATE: 18 BRPM | BODY MASS INDEX: 26.87 KG/M2

## 2024-11-15 PROBLEM — N17.9 AKI (ACUTE KIDNEY INJURY): Status: RESOLVED | Noted: 2024-11-07 | Resolved: 2024-11-15

## 2024-11-15 LAB
GLUCOSE BLDC GLUCOMTR-MCNC: 204 MG/DL (ref 70–130)
REAGIN AB CSF QL: NON REACTIVE
WNV IGG CSF QL: NEGATIVE
WNV IGM CSF QL: NEGATIVE

## 2024-11-15 PROCEDURE — 82948 REAGENT STRIP/BLOOD GLUCOSE: CPT

## 2024-11-15 PROCEDURE — 97535 SELF CARE MNGMENT TRAINING: CPT

## 2024-11-15 PROCEDURE — 99233 SBSQ HOSP IP/OBS HIGH 50: CPT

## 2024-11-15 PROCEDURE — 99239 HOSP IP/OBS DSCHRG MGMT >30: CPT | Performed by: STUDENT IN AN ORGANIZED HEALTH CARE EDUCATION/TRAINING PROGRAM

## 2024-11-15 PROCEDURE — 97530 THERAPEUTIC ACTIVITIES: CPT

## 2024-11-15 RX ORDER — CARVEDILOL 12.5 MG/1
TABLET ORAL
Start: 2024-11-15

## 2024-11-15 RX ORDER — DONEPEZIL HYDROCHLORIDE 5 MG/1
5 TABLET, FILM COATED ORAL NIGHTLY
Start: 2024-11-15 | End: 2024-12-15

## 2024-11-15 RX ORDER — VALPROIC ACID 250 MG/5ML
150 SOLUTION ORAL EVERY 12 HOURS SCHEDULED
Start: 2024-11-15

## 2024-11-15 RX ORDER — CLONIDINE HYDROCHLORIDE 0.2 MG/1
TABLET ORAL
Start: 2024-11-15

## 2024-11-15 RX ORDER — TORSEMIDE 5 MG/1
TABLET ORAL
Start: 2024-11-15

## 2024-11-15 RX ORDER — HYDRALAZINE HYDROCHLORIDE 100 MG/1
TABLET, FILM COATED ORAL
Start: 2024-11-15

## 2024-11-15 RX ADMIN — TIMOLOL MALEATE 1 DROP: 5 SOLUTION/ DROPS OPHTHALMIC at 08:10

## 2024-11-15 RX ADMIN — Medication 5 ML: at 08:08

## 2024-11-15 RX ADMIN — Medication 10 ML: at 08:11

## 2024-11-15 RX ADMIN — FLUOXETINE HYDROCHLORIDE 20 MG: 20 SOLUTION ORAL at 08:08

## 2024-11-15 RX ADMIN — BRIMONIDINE TARTRATE 1 DROP: 2 SOLUTION/ DROPS OPHTHALMIC at 10:45

## 2024-11-15 RX ADMIN — Medication 400 UNITS: at 08:11

## 2024-11-15 RX ADMIN — Medication 5 ML: at 11:27

## 2024-11-15 RX ADMIN — LANSOPRAZOLE 30 MG: 15 TABLET, ORALLY DISINTEGRATING, DELAYED RELEASE ORAL at 05:55

## 2024-11-15 RX ADMIN — VALPROIC ACID 150 MG: 250 SOLUTION ORAL at 08:09

## 2024-11-15 RX ADMIN — TERAZOSIN HYDROCHLORIDE 5 MG: 5 CAPSULE ORAL at 08:08

## 2024-11-15 RX ADMIN — AMLODIPINE BESYLATE 10 MG: 10 TABLET ORAL at 08:10

## 2024-11-15 NOTE — THERAPY TREATMENT NOTE
Patient Name: Omkar Nava  : 1955    MRN: [id number]                              Today's Date: 11/15/2024       Admit Date: 2024    Visit Dx:     ICD-10-CM ICD-9-CM   1. Acute kidney injury  N17.9 584.9   2. Hyperkalemia  E87.5 276.7   3. Generalized weakness  R53.1 780.79   4. History of diabetes mellitus  Z86.39 V12.29   5. Vascular dementia, unspecified dementia severity, unspecified whether behavioral, psychotic, or mood disturbance or anxiety  F01.50 290.40   6. 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, without long-term current use of insulin    Acute osteomyelitis of left foot    Type 2 diabetes mellitus    Essential hypertension    Psychotic disorder    Neurocognitive disorder    S/P transmetatarsal amputation of foot, left    Abscess of left foot    Anemia of chronic disease    Aspiration pneumonia    Cervical spine pain    Chronic kidney disease    Closed head injury without loss of consciousness    Dementia    Dental cavities    Diarrhea of presumed infectious origin    Displaced fracture of proximal phalanx of left great toe, initial encounter for open fracture    Dysphagia    Erectile dysfunction    Generalized muscle weakness    Hallucinations    Hypertensive heart and chronic kidney disease 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 failure, with stage 1 through stage 4 chronic kidney disease, or unspecified chronic kidney disease    Insomnia due to medical condition    Iron deficiency anemia    Laceration without foreign body, left foot, subsequent encounter    Personal history of Methicillin resistant Staphylococcus aureus infection    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    Protein calorie malnutrition    Simple chronic bronchitis    Tobacco use    Type 2 diabetes mellitus with diabetic neuropathy, unspecified    Type 2 diabetes mellitus with  diabetic chronic kidney disease    Acute type 1 respiratory failure    Severe vascular dementia without behavioral disturbance, psychotic disturbance, mood disturbance, or anxiety    JAYY (acute kidney injury)    Generalized weakness    Hyperkalemia     Past Medical History:   Diagnosis Date    Anemia     Dementia     Diabetes mellitus     Dysphagia     GERD (gastroesophageal reflux disease)     History of alcohol abuse     History of cocaine use     History of marijuana use     Hypertension     Osteomyelitis     Poor historian     records obtained from nursing home records & his family    Visual impairment      Past Surgical History:   Procedure Laterality Date    AMPUTATION FOOT Left 10/18/2022    Procedure: PARTIAL FIRST RAY AMPUTATION LEFT;  Surgeon: Yeison Petty MD;  Location:  [facility];  Service: Orthopedics;  Laterality: Left;    AMPUTATION FOOT Left 12/5/2022    Procedure: Transmetatarsal of Left Foot;  Surgeon: Yeison Petty MD;  Location:  [facility];  Service: Orthopedics;  Laterality: Left;    ENDOSCOPY N/A 3/14/2024    Procedure: ESOPHAGOGASTRODUODENOSCOPY WITH JEJUNAL TUBE INSERTION AT BEDSIDE;  Surgeon: Brunner, Mark I, MD;  Location:  [facility];  Service: Gastroenterology;  Laterality: N/A;    ENDOSCOPY W/ PEG TUBE PLACEMENT N/A 4/1/2024    Procedure: ESOPHAGOGASTRODUODENOSCOPY WITH PERCUTANEOUS ENDOSCOPIC GASTROSTOMY TUBE INSERTION;  Surgeon: Brunner, Mark I, MD;  Location:  [facility];  Service: Gastroenterology;  Laterality: N/A;  EGD with PEG placement.  Secured at 3.5 cm    EYE SURGERY        General Information       Row Name 11/15/24 0931          OT Time and Intention    Document Type therapy note (daily note)  -KF     Mode of Treatment occupational therapy;individual therapy  -KF       Row Name 11/15/24 0931          General Information    Patient Profile Reviewed yes  -KF     Existing Precautions/Restrictions fall;other (see comments)  PEG, Hx L transmetatarsal amp, Blind (sees  shadows)  -     Barriers to Rehab medically complex;previous functional deficit;cognitive status;visual deficit  -       Row Name 11/15/24 0931          Cognition    Orientation Status (Cognition) oriented to;person;disoriented to;place;situation;time  -       Row Name 11/15/24 0931          Safety Issues/Impairments Affecting Functional Mobility    Safety Issues Affecting Function (Mobility) awareness of need for assistance;insight into deficits/self-awareness;judgment;problem-solving;safety precaution awareness;safety precautions follow-through/compliance;sequencing abilities  -     Impairments Affecting Function (Mobility) balance;cognition;endurance/activity tolerance;grasp;range of motion (ROM);postural/trunk control;strength;visual/perceptual  -KF     Cognitive Impairments, Mobility Safety/Performance attention;awareness, need for assistance;insight into deficits/self-awareness;judgment;problem-solving/reasoning;safety precaution awareness;safety precaution follow-through;sequencing abilities  -KF               User Key  (r) = Recorded By, (t) = Taken By, (c) = Cosigned By      Initials Name Provider Type    KF Natalie Zambrano OT Occupational Therapist                     Mobility/ADL's       Row Name 11/15/24 0933          Bed Mobility    Bed Mobility supine-sit  -     Supine-Sit [place] (Bed Mobility) moderate assist (50% patient effort);2 person assist;verbal cues;nonverbal cues (demo/gesture)  -     Assistive Device (Bed Mobility) bed rails;head of bed elevated;repositioning sheet  -     Comment, (Bed Mobility) Increased time and effort with assistance needed at LEs and trunk  -       Row Name 11/15/24 0933          Transfers    Transfers bed-chair transfer;sit-stand transfer;stand-sit transfer  -       Row Name 11/15/24 0933          Bed-Chair Transfer    Bed-Chair [place] (Transfers) maximum assist (25% patient effort);2 person assist;verbal cues;nonverbal cues (demo/gesture)   -KF     Assistive Device (Bed-Chair Transfers) other (see comments)  BUE support  -     Comment, (Bed-Chair Transfer) SPT from the EOB to the chair  -       Row Name 11/15/24 0933          Sit-Stand Transfer    Sit-Stand [place] (Transfers) maximum assist (25% patient effort);2 person assist;nonverbal cues (demo/gesture)  -KF     Assistive Device (Sit-Stand Transfers) other (see comments)  BUE support  -     Comment, (Sit-Stand Transfer) STS x1 from the EOB, x1 from the chair  -       Row Name 11/15/24 0933          Stand-Sit Transfer    Stand-Sit [place] (Transfers) maximum assist (25% patient effort);2 person assist;verbal cues;nonverbal cues (demo/gesture)  -KF     Assistive Device (Stand-Sit Transfers) other (see comments)  E support  -       Row Name 11/15/24 0933          Activities of Daily Living    BADL Assessment/Intervention feeding  -KF       Row Name 11/15/24 0933          Self-Feeding Assessment/Training    [place] Level (Feeding) prepare tray/open items;liquids to mouth;scoop food and bring to mouth;maximum assist (25% patient effort)  -     Position (Feeding) supported sitting  -               User Key  (r) = Recorded By, (t) = Taken By, (c) = Cosigned By      Initials Name Provider Type    KF Natalie Zambrano OT Occupational Therapist                   Obj/Interventions       [facility] Name 11/15/24 0934          Shoulder (Therapeutic Exercise)    Shoulder (Therapeutic Exercise) AAROM (active assistive range of motion)  -     Shoulder AAROM (Therapeutic Exercise) bilateral;flexion;extension;sitting;10 repetitions  -[facility] Name 11/15/24 0934          Elbow/Forearm (Therapeutic Exercise)    Elbow/Forearm (Therapeutic Exercise) AAROM (active assistive range of motion)  -     Elbow/Forearm AAROM (Therapeutic Exercise) bilateral;flexion;extension;sitting;10 repetitions  -[facility] Name 11/15/24 0934          Hand (Therapeutic Exercise)    Hand (Therapeutic Exercise)  AROM (active range of motion);PROM (passive range of motion)  -KF     Hand AROM/AAROM (Therapeutic Exercise) AROM (active range of motion);finger flexion;finger extension;10 repetitions  -KF     Hand PROM (Therapeutic Exercise) bilateral;intrinsic stretch;extrinsic stretch;3 repetitions  -KF       Row Name 11/15/24 0934          Motor Skills    Therapeutic Exercise shoulder;elbow/forearm;hand  -KF       Row Name 11/15/24 0934          Balance    Balance Assessment sitting static balance;sitting dynamic balance;sit to stand dynamic balance;standing static balance;standing dynamic balance  -KF     Static Sitting Balance minimal assist;other (see comments);1-person assist  short moments of CGA  -KF     Dynamic Sitting Balance moderate assist;1-person assist  -KF     Position, Sitting Balance supported;sitting edge of bed  -KF     Sit to Stand Dynamic Balance maximum assist;2-person assist  -KF     Static Standing Balance maximum assist;2-person assist  -KF     Dynamic Standing Balance maximum assist;2-person assist  -KF     Position/Device Used, Standing Balance supported  -KF     Balance Interventions sitting;standing;sit to stand;supported;static;dynamic;occupation based/functional task  -KF               User Key  (r) = Recorded By, (t) = Taken By, (c) = Cosigned By      Initials Name Provider Type    KF Natalie Zambrano OT Occupational Therapist                   Goals/Plan    No documentation.                  Clinical Impression       Row Name 11/15/24 0935          Pain Assessment    Pretreatment Pain Rating 0/10 - no pain  -KF     Posttreatment Pain Rating 0/10 - no pain  -KF       Row Name 11/15/24 0935          Plan of Care Review    Plan of Care Reviewed With patient  -KF     Progress no change  -KF     Outcome Evaluation Pt continues to present below his functional baseline with generalized weakness, decreased activity tolerance, balance deficits, and decreased safety awareness warranting continued IP OT  services. The pt performed supine to sit transfer with modA x2 and SPT from the EOB to the chair with maxA x2, BUE support. Pt attempted second STS from the chair with maxA x2, BUE support, however pt unable to achieve upright posture. Pt assisted in feeding with maxA. Pt declined need/trial with built up utensils. Recommend a d/c to SNF for best outcome.  -       Row Name 11/15/24 0935          Therapy Assessment/Plan (OT)    Rehab Potential (OT) fair  -KF     Criteria for Skilled Therapeutic Interventions Met (OT) yes;meets criteria;skilled treatment is necessary  -KF     Therapy Frequency (OT) daily  -KF       Row Name 11/15/24 0935          Therapy Plan Review/Discharge Plan (OT)    Anticipated Discharge Disposition (OT) [facility] nursing facility  -       Row Name 11/15/24 0935          Vital Signs    Pre Systolic BP Rehab 155  -KF     Pre Treatment Diastolic BP 69  -KF     Post Systolic BP Rehab 126  no dizziness/lightheadedness noted during session  -KF     Post Treatment Diastolic BP 74  -KF     Pretreatment Heart Rate (beats/min) 57  -KF     Pre SpO2 (%) 99  -KF     O2 Delivery Pre Treatment room air  -KF     Pre Patient Position Supine  -KF     Intra Patient Position Standing  -KF     Post Patient Position Sitting  -KF       Row Name 11/15/24 0935          Positioning and Restraints    Pre-Treatment Position in bed  -KF     Post Treatment Position chair  -KF     In Chair notified nsg;reclined;call light within reach;encouraged to call for assist;exit alarm on;waffle cushion;on mechanical lift sling;legs elevated;RUE elevated  -KF               User Key  (r) = Recorded By, (t) = Taken By, (c) = Cosigned By      Initials Name Provider Type    Natalie Menjivar, OT Occupational Therapist                   Outcome Measures       Row Name 11/15/24 0939          How much help from another is currently needed...    Putting on and taking off regular lower body clothing? 1  -KF     Bathing (including washing,  rinsing, and drying) 2  -KF     Toileting (which includes using toilet bed pan or urinal) 1  -KF     Putting on and taking off regular upper body clothing 2  -KF     Taking care of personal grooming (such as brushing teeth) 2  -KF     Eating meals 2  -KF     AM-PAC 6 Clicks Score (OT) 10  -KF       Row Name 11/15/24 0938          Functional Assessment    Outcome Measure Options AM-PAC 6 Clicks Daily Activity (OT)  -KF               User Key  (r) = Recorded By, (t) = Taken By, (c) = Cosigned By      Initials Name Provider Type    Natalie Menjivar OT Occupational Therapist                    Occupational Therapy Education       Title: PT OT SLP Therapies (In Progress)       Topic: Occupational Therapy (In Progress)       Point: ADL training (In Progress)       Description:   Instruct learner(s) on proper safety adaptation and remediation techniques during self care or transfers.   Instruct in proper use of assistive devices.                  Learning Progress Summary            Patient Acceptance, E,TB, NR by  at 11/15/2024 0840    Acceptance, E, VU by  at 11/13/2024 1517    Acceptance, E, VU by  at 11/8/2024 1040                      Point: Home exercise program (In Progress)       Description:   Instruct learner(s) on appropriate technique for monitoring, assisting and/or progressing therapeutic exercises/activities.                  Learning Progress Summary            Patient Acceptance, E,TB, NR by  at 11/15/2024 0840                      Point: Precautions (In Progress)       Description:   Instruct learner(s) on prescribed precautions during self-care and functional transfers.                  Learning Progress Summary            Patient Acceptance, E,TB, NR by  at 11/15/2024 0840    Acceptance, E, VU by  at 11/13/2024 1517    Acceptance, E, VU by  at 11/8/2024 1040                      Point: Body mechanics (In Progress)       Description:   Instruct learner(s) on proper positioning and spine  alignment during self-care, functional mobility activities and/or exercises.                  Learning Progress Summary            Patient Acceptance, E,TB, NR by KF at 11/15/2024 0840    Acceptance, E, VU by MR at 11/13/2024 1517    Acceptance, E, VU by  at 11/8/2024 1040                                      User Key       Initials Effective Dates Name Provider Type Discipline    MR 09/22/22 -  Sonia Laureano, OT Occupational Therapist OT     08/09/23 -  Natalie Zambrano OT Occupational Therapist OT     08/08/24 -  Lissy Gillette OT Student OT Student OT                  OT Recommendation and Plan  Therapy Frequency (OT): daily  Plan of Care Review  Plan of Care Reviewed With: patient  Progress: no change  Outcome Evaluation: Pt continues to present below his functional baseline with generalized weakness, decreased activity tolerance, balance deficits, and decreased safety awareness warranting continued IP OT services. The pt performed supine to sit transfer with modA x2 and SPT from the EOB to the chair with maxA x2, BUE support. Pt attempted second STS from the chair with maxA x2, BUE support, however pt unable to achieve upright posture. Pt assisted in feeding with maxA. Pt declined need/trial with built up utensils. Recommend a d/c to SNF for best outcome.     Time Calculation:         Time Calculation- OT       Row Name 11/15/24 0939             Time Calculation- OT    OT Start Time 0840  -KF      OT Received On 11/15/24  -KF      OT Goal Re-Cert Due Date 11/18/24  -KF         Timed Charges    26550 - OT Therapeutic Exercise Minutes 5  -KF      59774 - OT Therapeutic Activity Minutes 10  -KF      77857 - OT Self Care/Mgmt Minutes 10  -KF         Total Minutes    Timed Charges Total Minutes 25  -KF       Total Minutes 25  -KF                User Key  (r) = Recorded By, (t) = Taken By, (c) = Cosigned By      Initials Name Provider Type    KF Natalie Zambrano, OT Occupational Therapist                   Therapy Charges for Today       Code Description Service Date Service Provider Modifiers Qty    [id number]  OT THERAPEUTIC ACT EA 15 MIN 11/15/2024 Natalie Zambrano OT GO, KX 1    17548832215 HC OT SELF CARE/MGMT/TRAIN EA 15 MIN 11/15/2024 Natalie Zambrano OT GO, KX 1    [id number]  OT THER SUPP EA 15 MIN 11/15/2024 Natalie Zambrano OT GO 2                 Natalie Zambrano OT  11/15/2024

## 2024-11-15 NOTE — CASE MANAGEMENT/SOCIAL WORK
Case Management Discharge Note      Final Note: Pt able to return to [facility] today. MSW verified and updated Mariela with [facility] of pt's return today. MSW spoke with pt's daughter, Idalia, who is agreeable to pt's discharge today to [facility]. MSW did deliver IMM over phone to pt's daughter. Pt has a 1500 ambulance with [facility] today back to [facility]. PCS submitted electronically. [facility] will get discharge summary out of EPIC. PHarmacy already changed to facility's pharamcy as well. Number to call report is [phone].    Provided Post Acute Provider List?: N/A  Provided Post Acute Provider Quality & Resource List?: N/A    Selected Continued Care - Admitted Since 11/7/2024       Destination Coordination complete.      Service Provider Services Address Phone Fax Patient Preferred    [facility] [address] [facility] [address] [phone] --              Durable Medical Equipment    No services have been selected for the patient.                Dialysis/Infusion    No services have been selected for the patient.                Home Medical Care    No services have been selected for the patient.                Therapy    No services have been selected for the patient.                Community Resources    No services have been selected for the patient.                Community & DME    No services have been selected for the patient.                    Selected Continued Care - Prior Encounters Includes continued care and service providers with selected services from prior encounters from 8/9/2024 to 11/15/2024      Discharged on 10/31/2024 Admission date: 10/20/2024 - Discharge disposition: Skilled Nursing Facility (DC - External)      Destination       Service Provider Services Address Phone Fax Patient Preferred    [facility] [address] [facility] [address] [phone] --                       Discharged on 9/18/2024 Admission date: 9/11/2024 - Discharge disposition: Skilled Nursing Facility (DC - External)      Destination       Service Provider Services Address Phone Fax Patient Preferred    [facility] [address], [facility] [address] [phone] --                               Final Discharge Disposition Code: 64 - nursing facility under Medicaid

## 2024-11-15 NOTE — PROGRESS NOTES
Psychiatric Neurology    Progress Note    Patient Name: Omkar Nava  : 1955  MRN: [id number]  Primary Care Physician:  Alberto Dye MD  Date of admission: 2024    Subjective     Chief Complaint: Altered mental status    History of Present Illness .  Patient seen resting comfortably in bed.  Continues at neurologic baseline.  Able to follow complex commands.  State his name and birthday.    Review of Systems   General: Negative for fever, nausea, or vomiting.   Neurological: Negative for headache, pain, or weakness.     Objective     Physical Exam  Vitals and nursing note reviewed.   Constitutional:       General: He is not in acute distress.     Appearance: He is not ill-appearing.   Eyes:      Extraocular Movements: Extraocular movements intact.      Pupils: Pupils are equal, round, and reactive to light.      Comments: No nystagmus or deviated gaze noted   Neurological:      Mental Status: He is alert. Mental status is at baseline.      Cranial Nerves: No facial asymmetry.      Sensory: No sensory deficit.      Motor: Weakness present. No seizure activity.      Comments:     Cranial Nerves   CN II: Pupils are equal, round, and reactive to light. Normal visual acuity and visual fields.    CN III IV VI: Extraocular movements are full without nystagmus.  CN V: Normal facial sensation and strength of muscles of mastication.  CN VII: Facial movements are symmetric. No weakness.  CN VIII:  Auditory acuity is normal.  CN IX & X:  Symmetric palatal movement.  CN XI: Sternocleidomastoid and trapezius are normal.  No weakness.  CN XII: The tongue is midline.  No atrophy or fasciculations.    Generalized weakness          Vitals:   Temp:  [97.5 °F (36.4 °C)-98.5 °F (36.9 °C)] 98.5 °F (36.9 °C)  Heart Rate:  [55-64] 59  Resp:  [16-18] 18  BP: (136-155)/(35-69) 155/69    Current Medications    Current Facility-Administered Medications:     acetaminophen (TYLENOL) 160 MG/5ML oral solution 650 mg, 650 mg,  Per PEG Tube, Q6H PRN, Eriberto, Norma, APRN    amLODIPine (NORVASC) tablet 10 mg, 10 mg, Per G Tube, Q24H, Jeanie Calixto MD, 10 mg at 11/15/24 0810    [Held by provider] ARIPiprazole (ABILIFY) tablet 5 mg, 5 mg, Per PEG Tube, Daily, Eriberto, Norma, APRN, 5 mg at 11/12/24 0811    sennosides-docusate (PERICOLACE) 8.6-50 MG per tablet 2 tablet, 2 tablet, Per PEG Tube, BID, 2 tablet at 11/13/24 2247 **AND** polyethylene glycol (MIRALAX) packet 17 g, 17 g, Per PEG Tube, Daily PRN **AND** [DISCONTINUED] bisacodyl (DULCOLAX) EC tablet 5 mg, 5 mg, Oral, Daily PRN **AND** bisacodyl (DULCOLAX) suppository 10 mg, 10 mg, Rectal, Daily PRN, Eriberto, Norma, APRN    brimonidine (ALPHAGAN) 0.2 % ophthalmic solution 1 drop, 1 drop, Both Eyes, TID, Eriberto, Norma, APRN, 1 drop at 11/14/24 2028    cholecalciferol 10 MCG/ML (400 units/mL) oral liquid 400 Units, 400 Units, Per PEG Tube, Daily, Eriberto, Norma, APRN, 400 Units at 11/15/24 0811    dextrose (D50W) (25 g/50 mL) IV injection 25 g, 25 g, Intravenous, Q15 Min PRN, Jeanie Calixto MD    dextrose (GLUTOSE) oral gel 15 g, 15 g, Oral, Q15 Min PRN, Jeanie Calixto MD    Enoxaparin Sodium (LOVENOX) syringe 40 mg, 40 mg, Subcutaneous, Nightly, Coral Valle MD, 40 mg at 11/14/24 2028    FLUoxetine (PROzac) 20 MG/5ML solution 20 mg, 20 mg, Per PEG Tube, Daily, Eriberto, Norma, APRN, 20 mg at 11/15/24 0808    glucagon (GLUCAGEN) injection 1 mg, 1 mg, Intramuscular, Q15 Min PRN, Jeanie Calixto MD    hydrOXYzine (ATARAX) tablet 25 mg, 25 mg, Per PEG Tube, TID PRN, Cullen Weiatha, APRN, 25 mg at 11/10/24 2031    ipratropium-albuterol (DUO-NEB) nebulizer solution 3 mL, 3 mL, Nebulization, Q4H PRN, Eriberto Norma, APRN    lansoprazole (PREVACID SOLUTAB) disintegrating tablet Tablet Delayed Release Dispersible 30 mg, 30 mg, Per PEG Tube, Q AM, Eriberto Norma, APRN, 30 mg at 11/15/24 0555    nitroglycerin  "(NITROSTAT) SL tablet 0.4 mg, 0.4 mg, Sublingual, Q5 Min PRN, Eriberto, Norma, APRN    palliative care oral rinse, 5 mL, Mouth/Throat, 4x Daily, Eriberto, Norma, APRN, 5 mL at 11/15/24 0808    [Held by provider] QUEtiapine (SEROquel) tablet 25 mg, 25 mg, Per PEG Tube, Nightly, Eriberto, Norma, APRN, 25 mg at 11/11/24 2137    sodium chloride 0.9 % flush 10 mL, 10 mL, Intravenous, PRN, John Jade MD    sodium chloride 0.9 % flush 10 mL, 10 mL, Intravenous, Q12H, Eriberto, Norma, APRN, 10 mL at 11/15/24 0811    sodium chloride 0.9 % flush 10 mL, 10 mL, Intravenous, PRN, Eriberto, Norma, APRN, 10 mL at 11/07/24 2319    sodium chloride 0.9 % infusion 40 mL, 40 mL, Intravenous, PRN, Eriberto, Norma, APRN    terazosin (HYTRIN) capsule 5 mg, 5 mg, Per PEG Tube, Q12H, Eriberto, Norma, APRN, 5 mg at 11/15/24 0808    timolol (TIMOPTIC) 0.5 % ophthalmic solution 1 drop, 1 drop, Both Eyes, BID, Eriberto, Norma, APRN, 1 drop at 11/15/24 0810    Valproic Acid (DEPAKENE) syrup 150 mg, 150 mg, Per PEG Tube, Q12H, Mila Crews K, APRN, 150 mg at 11/15/24 0809    Laboratory Results:   Lab Results   Component Value Date    GLUCOSE 172 (H) 11/14/2024    CALCIUM 9.3 11/14/2024     (L) 11/14/2024    K 4.1 11/14/2024    CO2 23.0 11/14/2024     11/14/2024    BUN 39 (H) 11/14/2024    CREATININE 1.21 11/14/2024    EGFRIFAFRI >60 07/25/2022    EGFRIFNONA >60 07/25/2022    BCR 32.2 (H) 11/14/2024    ANIONGAP 12.0 11/14/2024     Lab Results   Component Value Date    WBC 2.96 (L) 11/14/2024    HGB 8.0 (L) 11/14/2024    HCT 24.9 (L) 11/14/2024    MCV 93.3 11/14/2024     11/14/2024     No results found for: \"CHOL\"  Lab Results   Component Value Date    HDL 44 06/25/2022     Lab Results   Component Value Date    .4 (H) 06/25/2022     Lab Results   Component Value Date    TRIG 32 04/01/2024    TRIG 106 03/25/2024    TRIG 58 06/25/2022     Lab Results   Component Value Date    HGBA1C 5.60 09/15/2024     Lab " Results   Component Value Date    INR 1.07 11/14/2024    INR 1.05 03/29/2024    INR 1.38 (H) 03/12/2024    PROTIME 14.0 11/14/2024    PROTIME 13.8 03/29/2024    PROTIME 17.1 (H) 03/12/2024     Lab Results   Component Value Date    FOLATE 11.80 11/07/2024     Lab Results   Component Value Date    FXDYLMKQ32 1,303 (H) 11/07/2024       MRI Brain Without Contrast    Result Date: 11/12/2024  MRI BRAIN WO CONTRAST Date of Exam: 11/12/2024 9:30 PM EST Indication: lethargy in setting of dementia.  Comparison: 4/29/2022 Technique:  Routine multiplanar/multisequence sequence images of the brain were obtained without contrast administration. Findings: No acute infarct or abnormal restricted diffusion. There is no evidence of hemorrhage. No mass effect, edema or midline shift Mild periventricular and subcortical white matter T2/FLAIR hyperintensities, nonspecific but most likely represents chronic small vessel ischemic changes. No extra-axial fluid collection. The midline structures are unremarkable. Prominent ventricular system secondary to chronic parenchymal volume loss. The visualized flow voids are unremarkable. Status post bilateral left lens extraction. Stable postoperative changes noted within the right globe. Otherwise the orbits are unremarkable. The visualized paranasal sinuses and mastoid air cells are clear. The visualized soft tissues are unremarkable. No acute osseous abnormality.     Impression: Impression: No acute intracranial abnormality. Mild periventricular and subcortical T2/FLAIR hyperintensities are nonspecific but most likely represent chronic small vessel ischemic changes. Postoperative changes noted within the right globe. Electronically Signed: Ruben Medina DO  11/12/2024 9:59 PM EST  Workstation ID: [id number]        Assessment / Plan   Brief Patient Summary:  Omkar Nava is a 69 y.o. male with a past medical history significant for HTN, T2DM, CKD, vascular dementia, dysphagia s/p PEG and  "chronic iron deficiency anemia presented to [facility] on 11/7/2024 with complaint of generalized weakness.  Per patient's daughter she presented him at his nursing facility and he was \"not himself\".      Plan:   Vascular dementia  Altered mental status-improving  Encephalopathy-resolved  MRI brain no acute abnormalities  EEG with nonspecific moderate diffuse cerebral dysfunction.  No epileptic activity.  Continue home Abilify 5 mg daily (on hold)   Continue home Prozac 20 mg daily  Continue home Seroquel 25 mg daily (on hold)   Continue home Aricept 5 mg nightly  Decrease Depakote to 150 mg twice daily.  Patient is on this medication for behavioral reasons.  True trough VPA in a.m. 22.7  CSF analysis unconcerning for infection, WBCs 1.  Meningitis panel and cryptococcal negative.  Culture and AFB with no growth.  West Nile, Lyme, VDRL pending  After extensive workup patient symptoms were likely metabolic in nature.  With concern patient's medications may be aiding to his somnolence would recommend continuing decreased dose of Depakote (patient taking medication for behavioral issues with his dementia).  Would also continue to hold Abilify.  Patient is okay to restart Aricept.  Patient to follow-up with Dr. Easley, appointment already made for 12/2/2024.  Patient is okay for discharge from a neurologic standpoint.  Please feel free to reach out with any questions    I have discussed the above with the patient, bedside RN and Dr. Burgos.   Time spent with patient: 50 minutes in face-to-face evaluation and management of the patient.    Copied text in this note has been reviewed and is accurate as of 11/15/24.     NARGIS Sheridan            "

## 2024-11-15 NOTE — DISCHARGE SUMMARY
[facility] Medicine Services  DISCHARGE SUMMARY    Patient Name: Omkar Nava  : 1955  MRN: [id number]    Date of Admission: 2024  4:03 PM  Date of Discharge:  11/15/24  Primary Care Physician: Alberto Dye MD    Consults       Date and Time Order Name Status Description    2024  6:32 PM Inpatient Neurology Consult General Completed     10/28/2024  1:31 PM Inpatient Neurology Consult General Completed     10/28/2024  8:29 AM Inpatient Infectious Diseases Consult Completed             Hospital Course     Presenting Problem: Weakness    Active Hospital Problems    Diagnosis  POA    Generalized weakness [R53.1]  Yes    Hyperkalemia [E87.5]  Yes    Severe vascular dementia without behavioral disturbance, psychotic disturbance, mood disturbance, or anxiety [F01.C0]  Yes    Dementia [F03.90]  Yes    Anemia of chronic disease [D63.8]  Yes    Essential hypertension [I10]  Yes    Type 2 diabetes mellitus [E11.9]  Yes      Resolved Hospital Problems    Diagnosis Date Resolved POA    **JAYY (acute kidney injury) [N17.9] 11/15/2024 Yes          Hospital Course:  Omkar Nava is a 69 y.o. male with a PMHx of vascular dementia, dysphagia s/p PEG tube who presented from long-term care with weakness and family concern. Infectious work up unremarkable, cortisol stim test wnl.  No appreciated focal deficits from baseline. CT head wnl, EEG with dysfunction but no seizure activity, MRI also with small vessel dz without acute abnormality, LP without signs of infection.  Neurology was consulted and made medication adjustments for patient's baseline vascular dementia complicated by psychosis as patient's medications may be aiding to his somnolence.  Will continue to hold patient's Abilify and Seroquel.  Patient will continue Prozac, Depakote, and Aricept.  Patient will follow-up with PCP and neurology outpatient.  Further details documented below.     Acute somnolence, resolved    Baseline vascular dementia c/b psychosis  -work-up unremarkable to date  -CT head wnl, EEG with dysfunction but no seizure activity, MRI also with small vessel dz without acute abnormality  -CSF analysis unconcerning for infection, WBCs 1.  Meningitis panel and cryptococcal negative.  Culture and AFB with no growth.  West Nile, Lyme, VDRL pending  -improvement with holding abilify and seroquel. D/w neurology who agree w continued hold  -Continue home Prozac, Aricept.  Decrease Depakote to 150 mg twice daily.     Hypothermia - resolved  -s/p complete infectious w/u which was unremarkable  -s/p cortisol stim which was appropriate  -currently normal temperature: repeat CXR also without PNA (at risk of aspiration), WBC# wnl, procal only mildly elevated which is nonspecific     Hyperkalemic on admit, recurrent, resolved    - resolved w lokelma on admit but K>6.0. unclear etiology and trending down to normal  -of note, this is 3rd episode of hyperkalemia >6 on recent admissions w very mild AKIs (3/2024, 9/2024, now). Unclear etiology but recurrent nature more concerning  -Most recent potassium 4.1      Relative hypotension in setting of chronic HTN   -home amlodipine and terazosin restarted but home clonidine, coreg, hydralazine on hold.  -cortisol stim wnl  -Repeat echo with an EF of 56 to 60% concentric hypertrophy, similar to echo obtained in January 2024  -Continue to hold home clonidine, Coreg, hydralazine upon discharge. Follow up with PCP for continued management.      Bradycardia - improved - hold home carvedilol     Chronic anemia - stable  Chronic aspiration - daytime modified diet, nocturnal TF. Diet orders documented below.   DMII, A1c 5.6 - hypoglycemic 2/2 insulin trx of hypokalemia on admit, now resolved. Resume home metformin.   CHFpEF   Vascular dementia c/b psychosis - home valproic acid, fluoxetine. Abilify/seroquel on hold as above  Blindness      Discharge Follow Up Recommendations for outpatient  labs/diagnostics:  Continue Prozac, Depakote, Aricept.  Continue to hold Abilify and Seroquel.  Follow-up with PCP and neurology outpatient.    Day of Discharge     HPI:   Patient seen resting comfortably in bed in no acute distress.  No acute complaints concerns.  Seen eating breakfast with speech therapy today.  Informed daughter of transfer today.  Daughter was agreeable.        Vital Signs:   Temp:  [97.5 °F (36.4 °C)-98.5 °F (36.9 °C)] 97.7 °F (36.5 °C)  Heart Rate:  [50-64] 51  Resp:  [16-18] 18  BP: (136-155)/(35-69) 153/62    Physical Exam  Constitutional:       General: He is not in acute distress.  Cardiovascular:      Rate and Rhythm: Normal rate.      Pulses: Normal pulses.   Pulmonary:      Effort: No respiratory distress.   Abdominal:      General: There is no distension.      Palpations: Abdomen is soft.      Tenderness: There is no abdominal tenderness. There is no guarding or rebound.   Musculoskeletal:      Right lower leg: No edema.      Left lower leg: No edema.   Skin:     General: Skin is warm.   Neurological:      Mental Status: He is alert. Mental status is at baseline.      Comments: Oriented to name and date of birth       Pertinent  and/or Most Recent Results     LAB RESULTS:      Lab 11/14/24  0333 11/13/24  0815 11/12/24  1547 11/12/24  0837 11/11/24  1047 11/10/24  0632 11/09/24  0710   WBC 2.96* 4.69 6.79  --  5.60 6.20 5.53   HEMOGLOBIN 8.0* 8.3* 8.1*  --  8.5* 8.5* 8.5*   HEMATOCRIT 24.9* 25.0* 24.9*  --  25.6* 26.4* 26.6*   PLATELETS 277 280 280  --  316 308 306   NEUTROS ABS 1.61*  --   --   --   --   --  3.36   IMMATURE GRANS (ABS) 0.01  --   --   --   --   --  0.01   LYMPHS ABS 0.80  --   --   --   --   --  1.48   MONOS ABS 0.49  --   --   --   --   --  0.53   EOS ABS 0.01  --   --   --   --   --  0.12   MCV 93.3 92.6 91.9  --  90.1 92.6 93.3   PROCALCITONIN  --   --   --  0.78*  --   --   --    PROTIME 14.0  --   --   --   --   --   --          Lab 11/14/24  0333 11/13/24  0815  11/12/24  0837 11/11/24  1047 11/10/24  0632 11/09/24  1202 11/09/24  0711   SODIUM 135* 137 133* 135* 134*   < > 135*   POTASSIUM 4.1 4.4 4.5 4.1 5.0   < > 5.1   CHLORIDE 100 100 98 100 100   < > 101   CO2 23.0 25.0 24.0 25.0 24.0   < > 24.0   ANION GAP 12.0 12.0 11.0 10.0 10.0   < > 10.0   BUN 39* 39* 29* 25* 21   < > 23   CREATININE 1.21 1.39* 1.06 1.00 0.90   < > 0.91   EGFR 65.6 55.6* 76.9 82.5 93.6   < > 92.4   GLUCOSE 172* 112* 127* 149* 119*   < > 160*   CALCIUM 9.3 9.2 9.7 9.5 9.1   < > 9.7   MAGNESIUM  --   --   --  1.8 1.6  --  1.7   PHOSPHORUS  --   --   --   --  3.2  --  3.0    < > = values in this interval not displayed.         Lab 11/13/24  0815 11/12/24  0837 11/10/24  0632   TOTAL PROTEIN 6.8 7.0 6.3   ALBUMIN 3.6 3.6 3.6   GLOBULIN 3.2  --  2.7   ALT (SGPT) 44* 20 16   AST (SGOT) 41* 17 14   BILIRUBIN <0.2 <0.2 <0.2   BILIRUBIN DIRECT  --  <0.2  --    ALK PHOS 89 85 93         Lab 11/14/24  0333   PROTIME 14.0   INR 1.07                 Brief Urine Lab Results  (Last result in the past 365 days)        Color   Clarity   Blood   Leuk Est   Nitrite   Protein   CREAT   Urine HCG        11/07/24 1806 Yellow   Clear   Negative   Negative   Negative   Negative                 Microbiology Results (last 10 days)       Procedure Component Value - Date/Time    Meningitis / Encephalitis Panel, PCR - Cerebrospinal Fluid, Lumbar Puncture [[id number]]  (Normal) Collected: 11/13/24 1425    Lab Status: Final result Specimen: Cerebrospinal Fluid from Lumbar Puncture Updated: 11/13/24 1633     ESCHERICHIA COLI K1, PCR Not Detected     HAEMOPHILUS INFLUENZAE, PCR Not Detected     LISTERIA MONOCYTOGENES, PCR Not Detected     NEISSERIA MENINGITIDIS, PCR Not Detected     STREPTOCOCCUS AGALACTIAE, PCR Not Detected     STREPTOCOCCUS PNEUMONIAE, PCR Not Detected     CYTOMEGALOVIRUS (CMV), PCR Not Detected     ENTEROVIRUS, PCR Not Detected     HERPES SIMPLEX VIRUS 1 (HSV-1), PCR Not Detected     HERPES SIMPLEX VIRUS 2  (HSV-2), PCR Not Detected     HUMAN PARECHOVIRUS, PCR Not Detected     VARICELLA ZOSTER VIRUS (VZV), PCR Not Detected     CRYPTOCOCCUS NEOFORMANS / GATTII, PCR Not Detected     HUMAN HERPES VIRUS 6 PCR Not Detected    Culture, CSF - Cerebrospinal Fluid, Lumbar Puncture [[id number]] Collected: 11/13/24 1425    Lab Status: Preliminary result Specimen: Cerebrospinal Fluid from Lumbar Puncture Updated: 11/15/24 1007     CSF Culture No growth at 2 days     Gram Stain Rare (1+) WBCs per low power field      No organisms seen    Cryptococcal AG, CSF - Cerebrospinal Fluid, Lumbar Puncture [[id number]]  (Normal) Collected: 11/13/24 1425    Lab Status: Final result Specimen: Cerebrospinal Fluid from Lumbar Puncture Updated: 11/14/24 0811     Cryptococcal Antigen, CSF Negative    AFB Culture - Cerebrospinal Fluid, Lumbar Puncture [[id number]] Collected: 11/13/24 1425    Lab Status: Preliminary result Specimen: Cerebrospinal Fluid from Lumbar Puncture Updated: 11/14/24 1217     AFB Stain No acid fast bacilli seen on direct smear    Blood Culture - Blood, Hand, Left [[id number]]  (Normal) Collected: 11/09/24 0122    Lab Status: Final result Specimen: Blood from Hand, Left Updated: 11/14/24 0845     Blood Culture No growth at 5 days    Narrative:      Less than seven (7) mL's of blood was collected.  Insufficient quantity may yield false negative results.    Blood Culture - Blood, Hand, Right [[id number]]  (Normal) Collected: 11/09/24 0121    Lab Status: Final result Specimen: Blood from Hand, Right Updated: 11/14/24 0845     Blood Culture No growth at 5 days    Narrative:      Less than seven (7) mL's of blood was collected.  Insufficient quantity may yield false negative results.    Stool Culture (Reference Lab) - Stool, Per Rectum [[id number]] Collected: 11/08/24 0111    Lab Status: Final result Specimen: Stool from Per Rectum Updated: 11/12/24 1613     Salmonella/Shigella Screen Final report     Result 1 Comment     Comment: No  Salmonella or Shigella recovered.        Campylobacter Culture Final report     Result 1 Comment     Comment: No Campylobacter species isolated.        E coli, Shiga toxin Assay Negative    Narrative:      Performed at:  [address] [place] [address]  [id number]  : Jeremy Conteh PhD, Phone:  [phone]    Urine Culture - Urine, Urine, Catheter [[id number]]  (Normal) Collected: 11/07/24 1806    Lab Status: Final result Specimen: Urine, Catheter Updated: 11/08/24 1920     Urine Culture No growth    COVID PRE-OP / PRE-PROCEDURE SCREENING ORDER (NO ISOLATION) - Swab, Nasopharynx [[id number]]  (Normal) Collected: 11/07/24 1729    Lab Status: Final result Specimen: Swab from Nasopharynx Updated: 11/07/24 1803    Narrative:      The following orders were created for panel order COVID PRE-OP / PRE-PROCEDURE SCREENING ORDER (NO ISOLATION) - Swab, Nasopharynx.  Procedure                               Abnormality         Status                     ---------                               -----------         ------                     COVID-19 and FLU A/B PCR...[[id number]]  Normal              Final result                 Please view results for these tests on the individual orders.    COVID-19 and FLU A/B PCR, 1 HR TAT - Swab, Nasopharynx [[id number]]  (Normal) Collected: 11/07/24 1729    Lab Status: Final result Specimen: Swab from Nasopharynx Updated: 11/07/24 1803     COVID19 Not Detected     Influenza A PCR Not Detected     Influenza B PCR Not Detected    Narrative:      Fact sheet for providers: https://www.fda.gov/media/900271/download    Fact sheet for patients: https://www.fda.gov/media/053339/download    Test performed by PCR.            IR LUMBAR PUNCTURE DIAGNOSTIC    Result Date: 11/13/2024  IR LUMBAR PUNCTURE DIAGNOSTIC Date of Exam: 11/13/2024 1:45 PM EST Indication: AMS concern for infection.   Comparison: None available. Technique:  Procedure, risks, complications, and side  effects of lumbar puncture were discussed and reviewed in detail with the patient including the possibility for bleeding, infection, needle damage to surrounding structures, and the potential for a spinal headache. The patient indicated understanding and consented to the procedure.  The patient was placed in the prone position on the table. The back was prepped and draped in a sterile fashion. 1% lidocaine was used as local anesthetic to the skin and subcutaneous tissues. Under fluoroscopic guidance a 22 g spinal needle was advanced  at the L2-L3 level to the CSF space. Approximately 8 cc of clear and colorless CSF was returned and collected in 4 numbered vials. Sample vials were labeled and sent to pathology for further analysis. The needle was removed. Fluoroscopic Time: 18 seconds Number of Images: 1 Findings: The patient tolerated the procedure well, and no immediate complications occurred.     Impression: Successful fluoroscopic guided lumbar puncture as above with no immediate complications. Report dictated by: Maggi Silverman PA-c  I have personally reviewed this case and agree with the findings above: Electronically Signed: Anjum Prado MD  11/13/2024 2:54 PM EST  Workstation ID: [id number]    MRI Brain Without Contrast    Result Date: 11/12/2024  MRI BRAIN WO CONTRAST Date of Exam: 11/12/2024 9:30 PM EST Indication: lethargy in setting of dementia.  Comparison: 4/29/2022 Technique:  Routine multiplanar/multisequence sequence images of the brain were obtained without contrast administration. Findings: No acute infarct or abnormal restricted diffusion. There is no evidence of hemorrhage. No mass effect, edema or midline shift Mild periventricular and subcortical white matter T2/FLAIR hyperintensities, nonspecific but most likely represents chronic small vessel ischemic changes. No extra-axial fluid collection. The midline structures are unremarkable. Prominent ventricular system secondary to chronic parenchymal  volume loss. The visualized flow voids are unremarkable. Status post bilateral left lens extraction. Stable postoperative changes noted within the right globe. Otherwise the orbits are unremarkable. The visualized paranasal sinuses and mastoid air cells are clear. The visualized soft tissues are unremarkable. No acute osseous abnormality.     Impression: No acute intracranial abnormality. Mild periventricular and subcortical T2/FLAIR hyperintensities are nonspecific but most likely represent chronic small vessel ischemic changes. Postoperative changes noted within the right globe. Electronically Signed: Ruben Medina DO  11/12/2024 9:59 PM EST  Workstation ID: [id number]    EEG    Result Date: 11/12/2024  Reason for referral: 69 y.o.male with altered mental status, history of previous seizure Technical Summary:  A 19 channel digital EEG was performed using the international 10-20 placement system, including eye leads and EKG leads. Duration: 21 minutes Findings: The patient is awake and mumbling.  Diffuse medium amplitude 2-6 Hz intermixed delta and theta activity is seen symmetrically over both hemispheres.  There is an overlay of EMG artifact which obscures portions of the background.  Sleep is not clearly seen.  Photic stimulation does not change the background.  Hyperventilation is not performed.  No focal features or epileptiform activity are seen. Video: Available Technical quality: Good SUMMARY: Moderate generalized slow No focal features or epileptiform activity are seen     Diffuse cerebral dysfunction of moderate degree but nonspecific No ongoing seizures are seen This report is transcribed using the Dragon dictation system.  =    XR Chest 1 View    Result Date: 11/12/2024  XR CHEST 1 VW Date of Exam: 11/12/2024 1:04 PM EST Indication: borderline fever Comparison: Chest radiograph 11/7/2024. Findings: Cardiomediastinal silhouette is unchanged. No focal consolidation or overt pulmonary edema. No pleural  effusion or pneumothorax. Osseous structures are unchanged.     Impression: No evidence of acute cardiopulmonary disease. Electronically Signed: Anjum Prado MD  11/12/2024 1:14 PM EST  Workstation ID: [id number]    SLP FEES - Fiberoptic Endo Eval Swallow    Result Date: 11/11/2024  This procedure was auto-finalized with no dictation required.    CT Head Without Contrast    Result Date: 11/7/2024  CT HEAD WO CONTRAST Date of Exam: 11/7/2024 4:51 PM EST Indication: ams. Comparison: Noncontrast CT of the head performed on September 11, 2024 Technique: Axial CT images were obtained of the head without contrast administration.  Automated exposure control and iterative construction methods were used. Findings: There is no evidence of hemorrhage. There is no mass effect or midline shift. Age-related involutional changes are visualized. Bilateral periventricular white matter hypodensities are visualized compatible with changes of chronic microvessel ischemia. There is no extra-axial collections. Ventricles are normal in size and configuration for patient's stated age. Posterior fossa is within normal limits. Calvarium and skull base appear intact. Visualized sinuses show no air fluid levels. The mastoid air cells are clear. Postsurgical changes of the right orbit are visualized. Left ovary is within normal limits.     Impression: No acute intracranial process evident. Age-related involutional changes and findings compatible with changes of chronic microvascular ischemia. Electronically Signed: Quentin Osorio MD  11/7/2024 5:06 PM EST  Workstation ID: [id number]    XR Chest 1 View    Result Date: 11/7/2024  XR CHEST 1 VW Date of Exam: 11/7/2024 4:19 PM EST Indication: Weak/Dizzy/AMS triage protocol Comparison: Chest radiograph performed on October 26, 2024 Findings: No focal consolidation or effusion.  There is no evidence of pneumothorax.  The pulmonary vasculature appears within normal limits.  The cardiac and mediastinal  silhouette appear unremarkable.  No acute osseous abnormality identified.     Impression: No radiographic evidence of acute chest process. Electronically Signed: Quentin Osorio MD  11/7/2024 4:33 PM EST  Workstation ID: [id number]             Results for orders placed during the hospital encounter of 11/07/24    Adult Transthoracic Echo Limited W/ Cont if Necessary Per Protocol    Interpretation Summary    Left ventricular ejection fraction appears to be 56 - 60%.    Left ventricular wall thickness is consistent with concentric hypertrophy.  moderate.  Strain is normal and in normal pattern.  GLS -24%    Left ventricular diastolic dysfunction is noted.    There is a trivial pericardial effusion.      Plan for Follow-up of Pending Labs/Results: Will follow up  Pending Labs       Order Current Status    Lyme, Western Blot, CSF - Cerebrospinal Fluid, Lumbar Puncture In process    VDRL, CSF - Cerebrospinal Fluid, Lumbar Puncture In process    West Nile Virus, CSF In process    AFB Culture - Cerebrospinal Fluid, Lumbar Puncture Preliminary result    Culture, CSF - Cerebrospinal Fluid, Lumbar Puncture Preliminary result          Discharge Details        Discharge Medications        New Medications        Instructions Start Date   donepezil 5 MG tablet  Commonly known as: Aricept   5 mg, Oral, Nightly             Changes to Medications        Instructions Start Date   carvedilol 12.5 MG tablet  Commonly known as: COREG  What changed:   how much to take  how to take this  when to take this  additional instructions   Patient takes Coreg 12.5mg daily. Hold until follow up with PCP.      cloNIDine 0.2 MG tablet  Commonly known as: CATAPRES  What changed:   how much to take  how to take this  when to take this  additional instructions   Patient takes 0.2mg q8hrs. Hold until follow up with PCP.      hydrALAZINE 100 MG tablet  Commonly known as: APRESOLINE  What changed:   how much to take  how to take this  when to take  this  additional instructions   Patient takes hydralazine 100mg q8hrs. Hold until follow up with PCP.      torsemide 5 MG tablet  Commonly known as: DEMADEX  What changed:   how much to take  how to take this  when to take this  additional instructions   Patient takes 5mg daily. Hold until follow up with PCP.      Valproic Acid 250 MG/5ML solution syrup  Commonly known as: DEPAKENE  What changed:   how to take this  when to take this   150 mg, Per PEG Tube, Every 12 Hours Scheduled             Continue These Medications        Instructions Start Date   acetaminophen 160 MG/5ML solution  Commonly known as: TYLENOL   650 mg, Per G Tube, Every 6 Hours PRN      amLODIPine 10 MG tablet  Commonly known as: NORVASC   10 mg, Per G Tube, Every 24 Hours Scheduled      brimonidine 0.2 % ophthalmic solution  Commonly known as: ALPHAGAN   1 drop, Both Eyes, 3 Times Daily      FLUoxetine 20 MG/5ML solution  Commonly known as: PROzac   20 mg, Oral, Daily      hydrOXYzine 25 MG tablet  Commonly known as: ATARAX   25 mg, Per G Tube, 3 Times Daily PRN      ipratropium-albuterol 0.5-2.5 mg/3 ml nebulizer  Commonly known as: DUO-NEB   3 mL, Nebulization, Every 4 Hours PRN      lansoprazole 30 MG Tablet Delayed Release Dispersible disintegrating tablet  Commonly known as: PREVACID SOLUTAB   30 mg, Nasogastric, Every Early Morning      metFORMIN 500 MG tablet  Commonly known as: GLUCOPHAGE   500 mg, 2 Times Daily With Meals      PALLIATIVE CARE ORAL RINSE ([facility])   5 mL, Mouth/Throat, 4 Times Daily      polyethylene glycol 17 g packet  Commonly known as: MIRALAX   17 g, Oral, Daily      sennosides 8.8 MG/5ML syrup  Commonly known as: SENOKOT   10 mL, Per G Tube, Nightly      terazosin 5 MG capsule  Commonly known as: HYTRIN   5 mg, Per G Tube, Every 12 Hours Scheduled      timolol 0.5 % ophthalmic solution  Commonly known as: TIMOPTIC   1 drop, Both Eyes, 2 Times Daily      Vitamin D3 50 MCG (2000 UT) tablet   Administer 1 tablet per G  tube Daily.             Stop These Medications      ARIPiprazole 5 MG tablet  Commonly known as: ABILIFY     QUEtiapine 25 MG tablet  Commonly known as: SEROquel              No Known Allergies      Discharge Disposition:  Rehab Facility or Unit (DC - External)    Diet:  Hospital:  Diet Order   Procedures    Diet: Regular/House; No Mixed Consistencies, Feeding Assistance - Nursing; Texture: Mechanical Ground (NDD 2); Fluid Consistency: Honey Thick       Diet Instructions       Diet: Regular/House Diet, Tube Feeding; Bolus; nocturnal, cyclic feeds: Novasource Renal @ 43ml/hr + 20ml/hr FWF x12hr; Mechanical Ground (NDD 2); Honey Thick; No Mixed Consistencies      Discharge Diet:  Regular/House Diet  Tube Feeding       Feeding Type: Bolus    Formula, Amount & Frequency: nocturnal, cyclic feeds: Novasource Renal @ 43ml/hr + 20ml/hr FWF x12hr    Texture: Mechanical Ground (NDD 2)    Fluid Consistency: Honey Thick    Diet Modifiers / Additional Diets: No Mixed Consistencies    Pills crushed w/ puree or via g-tube. Ok for single ice chips or thin H2O via tsp only between meals, as tolerated.             Activity: Per SNF      Restrictions or Other Recommendations:  Per SNF       CODE STATUS:    Code Status and Medical Interventions: CPR (Attempt to Resuscitate); Full Support   Ordered at: 11/07/24 2023     Level Of Support Discussed With:    Patient     Code Status (Patient has no pulse and is not breathing):    CPR (Attempt to Resuscitate)     Medical Interventions (Patient has pulse or is breathing):    Full Support       Future Appointments   Date Time Provider Department Center   11/15/2024  3:00 PM MED [facility]   12/2/2024  9:45 AM Tommy Mcpherson MD [facility]   12/2/2024  1:45 PM Farzana Easley MD [facility]       Additional Instructions for the Follow-ups that You Need to Schedule       Discharge Follow-up with PCP   As directed       Currently Documented PCP:    Alberto Dye MD     PCP Phone Number:    [phone]     Follow Up Details: 1 week        Discharge Follow-up with Specialty: Follow up with neurology as scheduled.   As directed      Specialty: Follow up with neurology as scheduled.                      Ryne Burgos DO  11/15/24      Time Spent on Discharge:  I spent  35  minutes on this discharge activity which included: face-to-face encounter with the patient, reviewing the data in the system, coordination of the care with the nursing staff as well as consultants, documentation, and entering orders.

## 2024-11-15 NOTE — PLAN OF CARE
Problem: Adult Inpatient Plan of Care  Goal: Plan of Care Review  Outcome: Progressing  Goal: Patient-Specific Goal (Individualized)  Outcome: Progressing  Goal: Absence of Hospital-Acquired Illness or Injury  Outcome: Progressing  Intervention: Identify and Manage Fall Risk  Recent Flowsheet Documentation  Taken 11/15/2024 0200 by Wilma Beyer, RN  Safety Promotion/Fall Prevention:   activity supervised   assistive device/personal items within reach   clutter free environment maintained   fall prevention program maintained   gait belt   lighting adjusted   mobility aid in reach   nonskid shoes/slippers when out of bed   room organization consistent   safety round/check completed  Taken 11/15/2024 0000 by Wilma Beyer RN  Safety Promotion/Fall Prevention:   assistive device/personal items within reach   activity supervised   clutter free environment maintained   fall prevention program maintained   gait belt   lighting adjusted   mobility aid in reach   nonskid shoes/slippers when out of bed   room organization consistent   safety round/check completed  Taken 11/14/2024 2200 by Wilma Beyer, RN  Safety Promotion/Fall Prevention:   activity supervised   assistive device/personal items within reach   clutter free environment maintained   fall prevention program maintained   gait belt   lighting adjusted   mobility aid in reach   nonskid shoes/slippers when out of bed   room organization consistent   safety round/check completed  Taken 11/14/2024 2000 by Wilma Beyer, RN  Safety Promotion/Fall Prevention:   activity supervised   assistive device/personal items within reach   clutter free environment maintained   fall prevention program maintained   gait belt   mobility aid in reach   safety round/check completed   room organization consistent  Intervention: Prevent Skin Injury  Recent Flowsheet Documentation  Taken 11/15/2024 0200 by Wilma Beyer, RN  Body Position:   turned    side-lying   left  Skin Protection:   drying agents applied   incontinence pads utilized   transparent dressing maintained   silicone foam dressing in place  Taken 11/15/2024 0000 by Wilma Beyer RN  Body Position:   supine   head facing, right  Skin Protection:   drying agents applied   incontinence pads utilized   transparent dressing maintained   silicone foam dressing in place   protective footwear used  Taken 11/14/2024 2200 by Wilma Beyer RN  Body Position:   side-lying   left  Skin Protection:   drying agents applied   incontinence pads utilized   protective footwear used   transparent dressing maintained  Taken 11/14/2024 2000 by Wilma Beyer RN  Body Position:   side-lying   right  Skin Protection:   drying agents applied   incontinence pads utilized   protective footwear used   transparent dressing maintained  Intervention: Prevent and Manage VTE (Venous Thromboembolism) Risk  Recent Flowsheet Documentation  Taken 11/14/2024 2000 by Wilma Beyer RN  VTE Prevention/Management:   bilateral   SCDs (sequential compression devices) off   patient refused intervention  Intervention: Prevent Infection  Recent Flowsheet Documentation  Taken 11/15/2024 0200 by Wilma Beyer RN  Infection Prevention:   environmental surveillance performed   hand hygiene promoted   rest/sleep promoted   single patient room provided  Taken 11/15/2024 0000 by Wilma Beyer RN  Infection Prevention:   environmental surveillance performed   hand hygiene promoted   rest/sleep promoted   single patient room provided  Taken 11/14/2024 2200 by Wilma Beyer RN  Infection Prevention:   environmental surveillance performed   hand hygiene promoted   rest/sleep promoted   single patient room provided  Taken 11/14/2024 2000 by Wilma Beyer RN  Infection Prevention:   environmental surveillance performed   hand hygiene promoted   rest/sleep promoted   single patient room  provided  Goal: Optimal Comfort and Wellbeing  Outcome: Progressing  Intervention: Provide Person-Centered Care  Recent Flowsheet Documentation  Taken 11/14/2024 2000 by Wilma Beyer RN  Trust Relationship/Rapport:   care explained   choices provided   emotional support provided   empathic listening provided   questions answered   questions encouraged   reassurance provided  Goal: Readiness for Transition of Care  Outcome: Progressing     Problem: Fall Injury Risk  Goal: Absence of Fall and Fall-Related Injury  Outcome: Progressing  Intervention: Identify and Manage Contributors  Recent Flowsheet Documentation  Taken 11/14/2024 2000 by Wilma Beyer RN  Medication Review/Management: medications reviewed  Intervention: Promote Injury-Free Environment  Recent Flowsheet Documentation  Taken 11/15/2024 0200 by Wilma Beyer, RN  Safety Promotion/Fall Prevention:   activity supervised   assistive device/personal items within reach   clutter free environment maintained   fall prevention program maintained   gait belt   lighting adjusted   mobility aid in reach   nonskid shoes/slippers when out of bed   room organization consistent   safety round/check completed  Taken 11/15/2024 0000 by Wilma Beyer RN  Safety Promotion/Fall Prevention:   assistive device/personal items within reach   activity supervised   clutter free environment maintained   fall prevention program maintained   gait belt   lighting adjusted   mobility aid in reach   nonskid shoes/slippers when out of bed   room organization consistent   safety round/check completed  Taken 11/14/2024 2200 by Wilma Beyer, RN  Safety Promotion/Fall Prevention:   activity supervised   assistive device/personal items within reach   clutter free environment maintained   fall prevention program maintained   gait belt   lighting adjusted   mobility aid in reach   nonskid shoes/slippers when out of bed   room organization consistent    safety round/check completed  Taken 11/14/2024 2000 by Wilma Beyer, RN  Safety Promotion/Fall Prevention:   activity supervised   assistive device/personal items within reach   clutter free environment maintained   fall prevention program maintained   gait belt   mobility aid in reach   safety round/check completed   room organization consistent     Problem: Comorbidity Management  Goal: Blood Glucose Level Within Target Range  Outcome: Progressing  Intervention: Monitor and Manage Glycemia  Recent Flowsheet Documentation  Taken 11/14/2024 2000 by Wilma Beyer, RN  Medication Review/Management: medications reviewed  Goal: Blood Pressure in Desired Range  Outcome: Progressing  Intervention: Maintain Blood Pressure Management  Recent Flowsheet Documentation  Taken 11/14/2024 2000 by Wilma Beyer, RN  Medication Review/Management: medications reviewed     Problem: Skin Injury Risk Increased  Goal: Skin Health and Integrity  Outcome: Progressing  Intervention: Optimize Skin Protection  Recent Flowsheet Documentation  Taken 11/15/2024 0200 by Wilma Beyer, RN  Activity Management: activity encouraged  Pressure Reduction Techniques:   frequent weight shift encouraged   heels elevated off bed   positioned off wounds   weight shift assistance provided  Head of Bed (HOB) Positioning: HOB at 30-45 degrees  Pressure Reduction Devices:   heel offloading device utilized   positioning supports utilized   pressure-redistributing mattress utilized  Skin Protection:   drying agents applied   incontinence pads utilized   transparent dressing maintained   silicone foam dressing in place  Taken 11/15/2024 0000 by Wilma Beyer RN  Activity Management: activity encouraged  Pressure Reduction Techniques:   frequent weight shift encouraged   heels elevated off bed   positioned off wounds   weight shift assistance provided  Head of Bed (HOB) Positioning: HOB at 30-45 degrees  Pressure Reduction  Devices:   heel offloading device utilized   positioning supports utilized   pressure-redistributing mattress utilized  Skin Protection:   drying agents applied   incontinence pads utilized   transparent dressing maintained   silicone foam dressing in place   protective footwear used  Taken 11/14/2024 2200 by Wilma Beyer RN  Activity Management: activity encouraged  Pressure Reduction Techniques:   frequent weight shift encouraged   heels elevated off bed   positioned off wounds   weight shift assistance provided  Head of Bed (HOB) Positioning: HOB at 30-45 degrees  Pressure Reduction Devices:   heel offloading device utilized   positioning supports utilized   pressure-redistributing mattress utilized  Skin Protection:   drying agents applied   incontinence pads utilized   protective footwear used   transparent dressing maintained  Taken 11/14/2024 2000 by Wilma Beyer RN  Activity Management: activity encouraged  Pressure Reduction Techniques:   frequent weight shift encouraged   heels elevated off bed   positioned off wounds   weight shift assistance provided  Head of Bed (HOB) Positioning: HOB at 30-45 degrees  Pressure Reduction Devices:   heel offloading device utilized   positioning supports utilized   pressure-redistributing mattress utilized  Skin Protection:   drying agents applied   incontinence pads utilized   protective footwear used   transparent dressing maintained   Goal Outcome Evaluation:

## 2024-11-15 NOTE — PLAN OF CARE
Goal Outcome Evaluation:  Plan of Care Reviewed With: patient        Progress: no change  Outcome Evaluation: Pt continues to present below his functional baseline with generalized weakness, decreased activity tolerance, balance deficits, and decreased safety awareness warranting continued IP OT services. The pt performed supine to sit transfer with modA x2 and SPT from the EOB to the chair with maxA x2, BUE support. Pt attempted second STS from the chair with maxA x2, BUE support, however pt unable to achieve upright posture. Pt assisted in feeding with maxA. Pt declined need/trial with built up utensils. Recommend a d/c to SNF for best outcome.    Anticipated Discharge Disposition (OT): skilled nursing facility

## 2024-11-16 LAB
BACTERIA SPEC AEROBE CULT: NORMAL
GRAM STN SPEC: NORMAL
GRAM STN SPEC: NORMAL

## 2024-11-19 LAB
B BURGDOR IGG PATRN CSF IB-IMP: NEGATIVE
B BURGDOR IGM PATRN CSF IB-IMP: NEGATIVE
B BURGDOR18KD IGG CSF QL IB: ABNORMAL
B BURGDOR23KD IGG CSF QL IB: ABNORMAL
B BURGDOR23KD IGM CSF QL IB: ABNORMAL
B BURGDOR28KD IGG CSF QL IB: ABNORMAL
B BURGDOR30KD IGG CSF QL IB: ABNORMAL
B BURGDOR39KD IGG CSF QL IB: PRESENT
B BURGDOR39KD IGM CSF QL IB: ABNORMAL
B BURGDOR41KD IGG CSF QL IB: ABNORMAL
B BURGDOR41KD IGM CSF QL IB: ABNORMAL
B BURGDOR45KD IGG CSF QL IB: ABNORMAL
B BURGDOR58KD IGG CSF QL IB: PRESENT
B BURGDOR66KD IGG CSF QL IB: ABNORMAL
B BURGDOR93KD IGG CSF QL IB: PRESENT

## 2024-11-20 LAB
MYCOBACTERIUM SPEC CULT: NORMAL
NIGHT BLUE STAIN TISS: NORMAL

## 2024-11-22 ENCOUNTER — HOSPITAL ENCOUNTER (EMERGENCY)
Facility: HOSPITAL | Age: 69
Discharge: HOME | End: 2024-11-22
Attending: EMERGENCY MEDICINE
Payer: MEDICARE

## 2024-11-22 VITALS
WEIGHT: 177.25 LBS | BODY MASS INDEX: 27.82 KG/M2 | DIASTOLIC BLOOD PRESSURE: 56 MMHG | RESPIRATION RATE: 18 BRPM | SYSTOLIC BLOOD PRESSURE: 113 MMHG | OXYGEN SATURATION: 98 % | HEIGHT: 67 IN | HEART RATE: 52 BPM

## 2024-11-22 DIAGNOSIS — Z86.39 HISTORY OF DIABETES MELLITUS: ICD-10-CM

## 2024-11-22 DIAGNOSIS — E87.5 HYPERKALEMIA: ICD-10-CM

## 2024-11-22 DIAGNOSIS — R33.8 ACUTE URINARY RETENTION: Primary | ICD-10-CM

## 2024-11-22 LAB
ALBUMIN SERPL-MCNC: 3.9 G/DL (ref 3.5–5.2)
ALBUMIN/GLOB SERPL: 1.2 G/DL
ALP SERPL-CCNC: 98 U/L (ref 39–117)
ALT SERPL W P-5'-P-CCNC: 24 U/L (ref 1–41)
ANION GAP SERPL CALCULATED.3IONS-SCNC: 9 MMOL/L (ref 5–15)
AST SERPL-CCNC: 21 U/L (ref 1–40)
BASOPHILS # BLD AUTO: 0.02 10*3/MM3 (ref 0–0.2)
BASOPHILS NFR BLD AUTO: 0.3 % (ref 0–1.5)
BILIRUB SERPL-MCNC: <0.2 MG/DL (ref 0–1.2)
BILIRUB UR QL STRIP: NEGATIVE
BUN SERPL-MCNC: 40 MG/DL (ref 8–23)
BUN/CREAT SERPL: 31.7 (ref 7–25)
CALCIUM SPEC-SCNC: 9.6 MG/DL (ref 8.6–10.5)
CHLORIDE SERPL-SCNC: 100 MMOL/L (ref 98–107)
CLARITY UR: CLEAR
CO2 SERPL-SCNC: 26 MMOL/L (ref 22–29)
COLOR UR: YELLOW
CREAT SERPL-MCNC: 1.26 MG/DL (ref 0.76–1.27)
DEPRECATED RDW RBC AUTO: 51.4 FL (ref 37–54)
EGFRCR SERPLBLD CKD-EPI 2021: 62.5 ML/MIN/1.73
EOSINOPHIL # BLD AUTO: 0.29 10*3/MM3 (ref 0–0.4)
EOSINOPHIL NFR BLD AUTO: 4.5 % (ref 0.3–6.2)
ERYTHROCYTE [DISTWIDTH] IN BLOOD BY AUTOMATED COUNT: 14.8 % (ref 12.3–15.4)
GLOBULIN UR ELPH-MCNC: 3.2 GM/DL
GLUCOSE SERPL-MCNC: 117 MG/DL (ref 65–99)
GLUCOSE UR STRIP-MCNC: NEGATIVE MG/DL
HCT VFR BLD AUTO: 26.4 % (ref 37.5–51)
HGB BLD-MCNC: 8.4 G/DL (ref 13–17.7)
HGB UR QL STRIP.AUTO: NEGATIVE
IMM GRANULOCYTES # BLD AUTO: 0.02 10*3/MM3 (ref 0–0.05)
IMM GRANULOCYTES NFR BLD AUTO: 0.3 % (ref 0–0.5)
KETONES UR QL STRIP: NEGATIVE
LEUKOCYTE ESTERASE UR QL STRIP.AUTO: NEGATIVE
LYMPHOCYTES # BLD AUTO: 1.68 10*3/MM3 (ref 0.7–3.1)
LYMPHOCYTES NFR BLD AUTO: 26 % (ref 19.6–45.3)
MCH RBC QN AUTO: 30 PG (ref 26.6–33)
MCHC RBC AUTO-ENTMCNC: 31.8 G/DL (ref 31.5–35.7)
MCV RBC AUTO: 94.3 FL (ref 79–97)
MONOCYTES # BLD AUTO: 0.43 10*3/MM3 (ref 0.1–0.9)
MONOCYTES NFR BLD AUTO: 6.6 % (ref 5–12)
NEUTROPHILS NFR BLD AUTO: 4.03 10*3/MM3 (ref 1.7–7)
NEUTROPHILS NFR BLD AUTO: 62.3 % (ref 42.7–76)
NITRITE UR QL STRIP: NEGATIVE
NRBC BLD AUTO-RTO: 0 /100 WBC (ref 0–0.2)
PH UR STRIP.AUTO: 5.5 [PH] (ref 5–8)
PLATELET # BLD AUTO: 284 10*3/MM3 (ref 140–450)
PMV BLD AUTO: 10.2 FL (ref 6–12)
POTASSIUM SERPL-SCNC: 5.5 MMOL/L (ref 3.5–5.2)
PROT SERPL-MCNC: 7.1 G/DL (ref 6–8.5)
PROT UR QL STRIP: NEGATIVE
RBC # BLD AUTO: 2.8 10*6/MM3 (ref 4.14–5.8)
SODIUM SERPL-SCNC: 135 MMOL/L (ref 136–145)
SP GR UR STRIP: 1.02 (ref 1–1.03)
UROBILINOGEN UR QL STRIP: NORMAL
WBC NRBC COR # BLD AUTO: 6.47 10*3/MM3 (ref 3.4–10.8)

## 2024-11-22 PROCEDURE — 99283 EMERGENCY DEPT VISIT LOW MDM: CPT

## 2024-11-22 PROCEDURE — 81003 URINALYSIS AUTO W/O SCOPE: CPT | Performed by: EMERGENCY MEDICINE

## 2024-11-22 PROCEDURE — 85025 COMPLETE CBC W/AUTO DIFF WBC: CPT | Performed by: EMERGENCY MEDICINE

## 2024-11-22 PROCEDURE — 51702 INSERT TEMP BLADDER CATH: CPT

## 2024-11-22 PROCEDURE — 25810000003 SODIUM CHLORIDE 0.9 % SOLUTION: Performed by: EMERGENCY MEDICINE

## 2024-11-22 PROCEDURE — 80053 COMPREHEN METABOLIC PANEL: CPT | Performed by: EMERGENCY MEDICINE

## 2024-11-22 PROCEDURE — 87086 URINE CULTURE/COLONY COUNT: CPT | Performed by: EMERGENCY MEDICINE

## 2024-11-22 PROCEDURE — 93005 ELECTROCARDIOGRAM TRACING: CPT | Performed by: EMERGENCY MEDICINE

## 2024-11-22 RX ADMIN — SODIUM CHLORIDE 1000 ML: 9 INJECTION, SOLUTION INTRAVENOUS at 17:52

## 2024-11-22 RX ADMIN — SODIUM ZIRCONIUM CYCLOSILICATE 10 G: 10 POWDER, FOR SUSPENSION ORAL at 19:14

## 2024-11-22 NOTE — ED PROVIDER NOTES
[place]    EMERGENCY DEPARTMENT ENCOUNTER      Pt Name: Omkar Nava  MRN: [id number]  YOB: 1955  Date of evaluation: 11/22/2024  Provider: John Jade MD    CHIEF COMPLAINT       Chief Complaint   Patient presents with    Urinary Retention         HISTORY OF PRESENT ILLNESS   Okmar Nava is a 69 y.o. male who presents to the emergency department from facility with reported urinary retention. Patient has cognitive impairment at baseline and so history is limited but he has no complaints on my exam. Per report from EMS, facility has been having to in and out cath him as he has not been urinating on his own with expression of significant volume.       Nursing notes were reviewed.    REVIEW OF SYSTEMS     ROS:  A chief complaint appropriate review of systems was completed and is negative except as noted in the HPI.      PAST MEDICAL HISTORY     Past Medical History:   Diagnosis Date    Anemia     Dementia     Diabetes mellitus     Dysphagia     GERD (gastroesophageal reflux disease)     History of alcohol abuse     History of cocaine use     History of marijuana use     Hypertension     Osteomyelitis     Poor historian     records obtained from nursing home records & his family    Visual impairment          SURGICAL HISTORY       Past Surgical History:   Procedure Laterality Date    AMPUTATION FOOT Left 10/18/2022    Procedure: PARTIAL FIRST RAY AMPUTATION LEFT;  Surgeon: Yeison Petty MD;  Location:  [facility];  Service: Orthopedics;  Laterality: Left;    AMPUTATION FOOT Left 12/5/2022    Procedure: Transmetatarsal of Left Foot;  Surgeon: Yeison Petty MD;  Location:  [facility];  Service: Orthopedics;  Laterality: Left;    ENDOSCOPY N/A 3/14/2024    Procedure: ESOPHAGOGASTRODUODENOSCOPY WITH JEJUNAL TUBE INSERTION AT BEDSIDE;  Surgeon: Brunner, Mark I, MD;  Location: [facility] END[facility]COPY;  Service: Gastroenterology;  Laterality: N/A;    ENDOSCOPY W/ PEG TUBE PLACEMENT N/A 4/1/2024    Procedure:  ESOPHAGOGASTRODUODENOSCOPY WITH PERCUTANEOUS ENDOSCOPIC GASTROSTOMY TUBE INSERTION;  Surgeon: Brunner, Mark I, MD;  Location: [facility] ENDOSCOPY;  Service: Gastroenterology;  Laterality: N/A;  EGD with PEG placement.  Secured at 3.5 cm    EYE SURGERY           CURRENT MEDICATIONS     No current facility-administered medications for this encounter.    Current Outpatient Medications:     acetaminophen (TYLENOL) 160 MG/5ML solution, Administer 20.3 mL per G tube Every 6 (Six) Hours As Needed for Mild Pain or Fever., Disp: , Rfl:     amLODIPine (NORVASC) 10 MG tablet, Administer 1 tablet per G tube Daily., Disp: , Rfl:     brimonidine (ALPHAGAN) 0.2 % ophthalmic solution, Administer 1 drop to both eyes 3 (Three) Times a Day., Disp: , Rfl:     carvedilol (COREG) 12.5 MG tablet, Patient takes Coreg 12.5mg daily. Hold until follow up with PCP., Disp: , Rfl:     Cholecalciferol (Vitamin D3) 50 MCG (2000 UT) tablet, Administer 1 tablet per G tube Daily., Disp: , Rfl:     cloNIDine (CATAPRES) 0.2 MG tablet, Patient takes 0.2mg q8hrs. Hold until follow up with PCP., Disp: , Rfl:     donepezil (Aricept) 5 MG tablet, Take 1 tablet by mouth Every Night for 30 days., Disp: , Rfl:     FLUoxetine (PROzac) 20 MG/5ML solution, Take 5 mL by mouth Daily., Disp: , Rfl:     hydrALAZINE (APRESOLINE) 100 MG tablet, Patient takes hydralazine 100mg q8hrs. Hold until follow up with PCP., Disp: , Rfl:     hydrOXYzine (ATARAX) 25 MG tablet, Administer 1 tablet per G tube 3 (Three) Times a Day As Needed for Anxiety, Allergies or Itching., Disp: , Rfl:     ipratropium-albuterol (DUO-NEB) 0.5-2.5 mg/3 ml nebulizer, Take 3 mL by nebulization Every 4 (Four) Hours As Needed for Shortness of Air or Wheezing., Disp: , Rfl:     lansoprazole (PREVACID SOLUTAB) 30 MG Tablet Delayed Release Dispersible disintegrating tablet, 1 tablet by Nasogastric route Every Morning., Disp: , Rfl:     metFORMIN (GLUCOPHAGE) 500 MG tablet, Administer 1 tablet per G tube 2  "(Two) Times a Day With Meals., Disp: , Rfl:     PALLIATIVE CARE ORAL RINSE, [facility],, Apply 5 mL to the mouth or throat 4 (Four) Times a Day., Disp: , Rfl:     polyethylene glycol (MIRALAX) 17 g packet, Take 17 g by mouth Daily., Disp: 30 each, Rfl: 0    sennosides (SENOKOT) 8.8 MG/5ML syrup, Administer 10 mL per G tube Every Night., Disp: , Rfl:     terazosin (HYTRIN) 5 MG capsule, Administer 1 capsule per G tube Every 12 (Twelve) Hours., Disp: , Rfl:     timolol (TIMOPTIC) 0.5 % ophthalmic solution, Administer 1 drop to both eyes 2 (Two) Times a Day., Disp: , Rfl:     torsemide (DEMADEX) 5 MG tablet, Patient takes 5mg daily. Hold until follow up with PCP., Disp: , Rfl:     Valproic Acid (DEPAKENE) 250 MG/5ML solution syrup, 3 mL by Per PEG Tube route Every 12 (Twelve) Hours., Disp: , Rfl:     ALLERGIES     Patient has no known allergies.    FAMILY HISTORY       Family History   Problem Relation Age of Onset    Diabetes Mother     Hypertension Mother     Hypertension Father     Diabetes Father     Diabetes Sister     Hypertension Brother     Diabetes Brother     Diabetes Maternal Grandmother     Diabetes Maternal Grandfather     Hypertension Brother     Diabetes Brother           SOCIAL HISTORY       Social History     Socioeconomic History    Marital status: Single   Tobacco Use    Smoking status: Former     Current packs/day: 0.00     Average packs/day: 1 pack/day for 40.0 years (40.0 ttl pk-yrs)     Types: Cigarettes     Start date: 1977     Quit date: 2017     Years since quittin.5     Passive exposure: Past    Smokeless tobacco: Never   Vaping Use    Vaping status: Never Used   Substance and Sexual Activity    Alcohol use: Not Currently     Comment: histgry of alcohol abuse    Drug use: Yes     Types: Marijuana, \"Crack\" cocaine     Comment: PATIENT STATES \"IT'S BEEN A FEW DAYS\"    Sexual activity: Defer         PHYSICAL EXAM    (up to 7 for level 4, 8 or more for level 5)     Vitals:    24 1700 " "11/22/24 1702 11/22/24 1730   BP: 124/60 124/60 113/56   BP Location:  Right arm    Patient Position:  Lying    Pulse: 54 55 52   Resp:  18    SpO2: 97% 97% 98%   Weight:  80.4 kg (177 lb 4 oz)    Height:  170.2 cm (67\")        General: Awake, alert, no acute distress.  HEENT: Conjunctivae normal.  Neck: Trachea midline.  Cardiac: Heart regular rate, rhythm, no murmurs, rubs, or gallops  Lungs: Lungs are clear to auscultation, there is no wheezing, rhonchi, or rales. There is no use of accessory muscles.  Chest wall: There is no tenderness to palpation over the chest wall or over ribs  Abdomen: Abdomen is soft, nontender, nondistended. There are no firm or pulsatile masses, no rebound rigidity or guarding.   Musculoskeletal: No deformity.  Neuro: Alert   Dermatology: Skin is warm and dry  Psych: Pleasant        DIAGNOSTIC RESULTS     EKG: All EKGs are interpreted by the Emergency Department Physician who either signs or Co-signs this chart in the absence of a cardiologist.    ECG 12 Lead Other; hyper K   Preliminary Result   Test Reason : HYPER K   Blood Pressure :   */*   mmHG   Vent. Rate :  54 BPM     Atrial Rate :  54 BPM      P-R Int : 246 ms          QRS Dur :  84 ms       QT Int : 430 ms       P-R-T Axes :  62  -2  19 degrees     QTcB Int : 407 ms      Sinus bradycardia with sinus arrhythmia with 1st degree AV block   Otherwise normal ECG   When compared with ECG of 07-Nov-2024 17:36,   No significant change was found      Referred By: ED MD           Confirmed By:             RADIOLOGY:   [x] Radiologist's Report Reviewed:  No orders to display       I ordered and independently reviewed the above noted radiographic studies.        LABS:    I have reviewed and interpreted all of the currently available lab results from this visit (if applicable):  Results for orders placed or performed during the hospital encounter of 11/22/24   Comprehensive Metabolic Panel    Collection Time: 11/22/24  4:47 PM    Specimen: " Blood   Result Value Ref Range    Glucose 117 (H) 65 - 99 mg/dL    BUN 40 (H) 8 - 23 mg/dL    Creatinine 1.26 0.76 - 1.27 mg/dL    Sodium 135 (L) 136 - 145 mmol/L    Potassium 5.5 (H) 3.5 - 5.2 mmol/L    Chloride 100 98 - 107 mmol/L    CO2 26.0 22.0 - 29.0 mmol/L    Calcium 9.6 8.6 - 10.5 mg/dL    Total Protein 7.1 6.0 - 8.5 g/dL    Albumin 3.9 3.5 - 5.2 g/dL    ALT (SGPT) 24 1 - 41 U/L    AST (SGOT) 21 1 - 40 U/L    Alkaline Phosphatase 98 39 - 117 U/L    Total Bilirubin <0.2 0.0 - 1.2 mg/dL    Globulin 3.2 gm/dL    A/G Ratio 1.2 g/dL    BUN/Creatinine Ratio 31.7 (H) 7.0 - 25.0    Anion Gap 9.0 5.0 - 15.0 mmol/L    eGFR 62.5 >60.0 mL/min/1.73   CBC Auto Differential    Collection Time: 11/22/24  4:47 PM    Specimen: Blood   Result Value Ref Range    WBC 6.47 3.40 - 10.80 10*3/mm3    RBC 2.80 (L) 4.14 - 5.80 10*6/mm3    Hemoglobin 8.4 (L) 13.0 - 17.7 g/dL    Hematocrit 26.4 (L) 37.5 - 51.0 %    MCV 94.3 79.0 - 97.0 fL    MCH 30.0 26.6 - 33.0 pg    MCHC 31.8 31.5 - 35.7 g/dL    RDW 14.8 12.3 - 15.4 %    RDW-SD 51.4 37.0 - 54.0 fl    MPV 10.2 6.0 - 12.0 fL    Platelets 284 140 - 450 10*3/mm3    Neutrophil % 62.3 42.7 - 76.0 %    Lymphocyte % 26.0 19.6 - 45.3 %    Monocyte % 6.6 5.0 - 12.0 %    Eosinophil % 4.5 0.3 - 6.2 %    Basophil % 0.3 0.0 - 1.5 %    Immature Grans % 0.3 0.0 - 0.5 %    Neutrophils, Absolute 4.03 1.70 - 7.00 10*3/mm3    Lymphocytes, Absolute 1.68 0.70 - 3.10 10*3/mm3    Monocytes, Absolute 0.43 0.10 - 0.90 10*3/mm3    Eosinophils, Absolute 0.29 0.00 - 0.40 10*3/mm3    Basophils, Absolute 0.02 0.00 - 0.20 10*3/mm3    Immature Grans, Absolute 0.02 0.00 - 0.05 10*3/mm3    nRBC 0.0 0.0 - 0.2 /100 WBC   Urine Culture - Urine, Straight Cath    Collection Time: 11/22/24  5:00 PM    Specimen: Straight Cath; Urine   Result Value Ref Range    Urine Culture No growth    Urinalysis With Culture If Indicated - Straight Cath    Collection Time: 11/22/24  5:00 PM    Specimen: Straight Cath; Urine   Result Value  Ref Range    Color, UA Yellow Yellow, Straw    Appearance, UA Clear Clear    pH, UA 5.5 5.0 - 8.0    Specific Gravity, UA 1.016 1.001 - 1.030    Glucose, UA Negative Negative    Ketones, UA Negative Negative    Bilirubin, UA Negative Negative    Blood, UA Negative Negative    Protein, UA Negative Negative    Leuk Esterase, UA Negative Negative    Nitrite, UA Negative Negative    Urobilinogen, UA 0.2 E.U./dL 0.2 - 1.0 E.U./dL   ECG 12 Lead Other; hyper K    Collection Time: 11/22/24  6:10 PM   Result Value Ref Range    QT Interval 430 ms    QTC Interval 407 ms        If labs were ordered, I independently reviewed the results and considered them in treating the patient.      EMERGENCY DEPARTMENT COURSE and DIFFERENTIAL DIAGNOSIS/MDM:   Vitals:  AS OF 09:26 EST    BP - 113/56  HR - 52  TEMP -    O2 SATS - 98%        Discussion below represents my analysis of pertinent findings related to patient's condition, differential diagnosis, treatment plan and final disposition.      Differential diagnosis:  The differential diagnosis associated with the patient's presentation includes: BPH, neurogenic bladder, stricture      Independent interpretations (ECG/rhythm strip/X-ray/US/CT scan): I independently interpreted the pt cardiac monitor which showed NSR      Additional sources:  Discussed/obtained information from independent historians:   [] Spouse:   [] Parent:   [] Friend:   [x] EMS: Report was taken from EMS and is as noted in the HPI - VSS during transport   [] Other:  External (non-ED) record review:   [] Inpatient record:   [] Office record:   [] Outpatient record:   [] Prior Outpatient labs:   [] Prior Outpatient radiology:   [] Primary Care record:   [] Outside ED record:   [x] Other: Reviewed discharge summary from 11/15. Pt admitted for AMS - workup was negative. Had some hyperkalemia on admission tx w lokelma.      Patient's care impacted by:   [x] Diabetes   [] Hypertension   [] Coronary Artery Disease   []  Cancer   [x] Other: vascular dementia    Care significantly affected by Social Determinants of Health (housing and economic circumstances, unemployment)    [] Yes     [x] No   If yes, Patient's care significantly limited by  Social Determinants of Health including:    [] Inadequate housing    [] Low income    [] Alcoholism and drug addiction in family    [] Problems related to primary support group    [] Unemployment    [] Problems related to employment    [] Other Social Determinants of Health:       ED Course:    ED Course as of 11/25/24 0926   Fri Nov 22, 2024   8605 On reevaluation, patient remains very well-appearing and nontoxic.  Resting comfortably in bed in no distress.  Vital signs remain within normal limits.  Potassium minimally elevated at 5.5.  Providing dose of Lokelma.  No concerning ECG changes.  Patient appears to be experiencing some urinary retention in the setting of a history of prostate issues.  Will place indwelling Story catheter and have patient follow-up with urology as an outpatient.  Patient will need for recheck of his potassium within a couple of days to ensure that it is stabilized. [NS]      ED Course User Index  [NS] John Jade MD             I had a discussion with the patient/family regarding diagnosis, diagnostic results, treatment plan, and medications.  The patient/family indicated understanding of these instructions.  I spent adequate time at the bedside preceding discharge necessary to personally discuss the aftercare instructions, giving patient education, providing explanations of the results of our evaluations/findings, and my decision making to assure that the patient/family understand the plan of care.  Time was allotted to answer questions at that time and throughout the ED course.  Emphasis was placed on timely follow-up after discharge.  I also discussed the potential for the development of an acute emergent condition requiring further evaluation, admission,  or even surgical intervention. I discussed that we found nothing during the visit today indicating the need for further workup, admission, or the presence of an unstable medical condition.  I encouraged the patient to return to the emergency department immediately for ANY concerns, worsening, new complaints, or if symptoms persist and unable to seek follow-up in a timely fashion.  The patient/family expressed understanding and agreement with this plan.  The patient will follow-up with their PCP in 1-2 days for reevaluation.           PROCEDURES:  Procedures    CRITICAL CARE TIME        FINAL IMPRESSION      1. Acute urinary retention    2. Hyperkalemia    3. History of diabetes mellitus          DISPOSITION/PLAN     ED Disposition       ED Disposition   Discharge    Condition   Stable    Comment   --                 Comment: Please note this report has been produced using speech recognition software.      John Jade MD  Attending Emergency Physician             John Jade MD  11/25/24 0929

## 2024-11-22 NOTE — DISCHARGE INSTRUCTIONS
1.  Patient will need close follow-up with urology for evaluation of urinary retention and to assess further need for indwelling Story catheter.    2.  Patient will need recheck of his potassium within the next couple of days to ensure that it has stabilized.

## 2024-11-24 LAB — BACTERIA SPEC AEROBE CULT: NO GROWTH

## 2024-11-25 LAB
QT INTERVAL: 430 MS
QTC INTERVAL: 407 MS

## 2024-11-27 LAB
MYCOBACTERIUM SPEC CULT: NORMAL
NIGHT BLUE STAIN TISS: NORMAL

## 2024-12-02 ENCOUNTER — OFFICE VISIT (OUTPATIENT)
Dept: NEUROLOGY | Facility: CLINIC | Age: 69
End: 2024-12-02
Payer: MEDICARE

## 2024-12-02 VITALS
WEIGHT: 177 LBS | DIASTOLIC BLOOD PRESSURE: 66 MMHG | SYSTOLIC BLOOD PRESSURE: 110 MMHG | OXYGEN SATURATION: 95 % | HEART RATE: 67 BPM | BODY MASS INDEX: 27.78 KG/M2 | HEIGHT: 67 IN

## 2024-12-02 DIAGNOSIS — R41.3 MEMORY LOSS: Primary | ICD-10-CM

## 2024-12-02 PROCEDURE — 3078F DIAST BP <80 MM HG: CPT | Performed by: PSYCHIATRY & NEUROLOGY

## 2024-12-02 PROCEDURE — 1159F MED LIST DOCD IN RCRD: CPT | Performed by: PSYCHIATRY & NEUROLOGY

## 2024-12-02 PROCEDURE — 1160F RVW MEDS BY RX/DR IN RCRD: CPT | Performed by: PSYCHIATRY & NEUROLOGY

## 2024-12-02 PROCEDURE — 99214 OFFICE O/P EST MOD 30 MIN: CPT | Performed by: PSYCHIATRY & NEUROLOGY

## 2024-12-02 PROCEDURE — 3074F SYST BP LT 130 MM HG: CPT | Performed by: PSYCHIATRY & NEUROLOGY

## 2024-12-02 RX ORDER — DONEPEZIL HYDROCHLORIDE 10 MG/1
10 TABLET, FILM COATED ORAL NIGHTLY
Qty: 30 TABLET | Refills: 11 | Status: SHIPPED | OUTPATIENT
Start: 2024-12-02 | End: 2025-12-02

## 2024-12-02 NOTE — PROGRESS NOTES
Subjective:    CC: Omkar Nava is seen today in consultation at the request of [facility] for memory problems     Current visit-patient comes in today accompanied by her caregiver.  He was recently admitted to the hospital for reduced responsiveness/lethargy/generalized weakness.  He had a MRI brain that showed generalized atrophy with mild chronic ischemic changes but no acute intracranial abnormalities.  He EEG showed moderate generalized slowing but no epileptiform activity.  He also had a lumbar puncture that was unremarkable with negative meningitis encephalitis panel.  A lot of his medications including Abilify and Seroquel was stopped during that hospitalization.  He was continued on donepezil 5 mg and Depakote but overall his dose was reduced to 150 mg twice daily (even though his level was low).  Even after being discharged patient continues to have daytime somnolence like today.  He states that he still has occasional visual hallucinations.  Of note-I personally reviewed reviewed his MRI brain and his hospital notes    Initial [id number] -American male resident of [facility] living [facility] accompanied by his daughter with a history of hypertension, diabetes mellitus type 2, CKD, recurrent aspiration pneumonia status post PEG tube, osteomyelitis status post left toe amputation, visual impairment, mood disorder presents with memory problems.  As per daughter patient started to have memory problems about 2-1/2 years ago.  He previously moved from house to house (living with different women) but could carry out his ADLs.  Was also working up until 3 years ago.  After that he moved in with his sister and subsequently into a nursing home.  Also started to have hallucinations at the time.  Was abusing drugs including crack cocaine till he moved into a nursing home.  In the past 5 months he has been to the hospital several times.  Was initially admitted in January for pneumonia then in  February for altered mental status with a tongue bite.  CT chest showed volume overload.  Had a CT head at that time that was unremarkable as well as an EEG that showed mild slowing but no epileptiform activity.  Was started on Abilify for his mood in addition to the Depakote that he had been previously taking also for his mood.  Denies having any recent history of seizures (may have had seizures as a child).  He was admitted a third time in March for aspiration pneumonia (cultures positive for ESBL pneumonia).  Had to be intubated and also underwent PEG tube placement.  Had another EEG that showed triphasic waves but no epileptiform activity.  Is back to [facility] now.  Since his recent hospitalizations he has been extremely weak and unable to walk.  Prior to that he was using a walker.  He states that he is sleeping poorly at night.  Also continues to have occasional visual hallucinations where he sees either people or objects such as motorcycles.  Has been legally blind in both eyes for about 2 years now despite having multiple eye surgeries.  His last A1c was 7, B12 was 507 and GFR was above 80  Of note-I personally reviewed his CT head and his extensive hospital notes    The following portions of the patient's history were reviewed today and updated as of 05/28/2024  : allergies, current medications, past family history, past medical history, past social history, past surgical history, and problem list  These document will be scanned to patient's chart.      Current Outpatient Medications:     acetaminophen (TYLENOL) 160 MG/5ML solution, Administer 20.3 mL per G tube Every 6 (Six) Hours As Needed for Mild Pain or Fever., Disp: , Rfl:     amLODIPine (NORVASC) 10 MG tablet, Administer 1 tablet per G tube Daily., Disp: , Rfl:     brimonidine (ALPHAGAN) 0.2 % ophthalmic solution, Administer 1 drop to both eyes 3 (Three) Times a Day., Disp: , Rfl:     carvedilol (COREG) 12.5 MG tablet, Patient takes Coreg 12.5mg  daily. Hold until follow up with PCP., Disp: , Rfl:     Cholecalciferol (Vitamin D3) 50 MCG (2000 UT) tablet, Administer 1 tablet per G tube Daily., Disp: , Rfl:     cloNIDine (CATAPRES) 0.2 MG tablet, Patient takes 0.2mg q8hrs. Hold until follow up with PCP., Disp: , Rfl:     FLUoxetine (PROzac) 20 MG/5ML solution, Take 5 mL by mouth Daily., Disp: , Rfl:     hydrALAZINE (APRESOLINE) 100 MG tablet, Patient takes hydralazine 100mg q8hrs. Hold until follow up with PCP., Disp: , Rfl:     ipratropium-albuterol (DUO-NEB) 0.5-2.5 mg/3 ml nebulizer, Take 3 mL by nebulization Every 4 (Four) Hours As Needed for Shortness of Air or Wheezing., Disp: , Rfl:     lansoprazole (PREVACID SOLUTAB) 30 MG Tablet Delayed Release Dispersible disintegrating tablet, 1 tablet by Nasogastric route Every Morning., Disp: , Rfl:     metFORMIN (GLUCOPHAGE) 500 MG tablet, Administer 1 tablet per G tube 2 (Two) Times a Day With Meals., Disp: , Rfl:     PALLIATIVE CARE ORAL RINSE, [facility],, Apply 5 mL to the mouth or throat 4 (Four) Times a Day., Disp: , Rfl:     polyethylene glycol (MIRALAX) 17 g packet, Take 17 g by mouth Daily., Disp: 30 each, Rfl: 0    sennosides (SENOKOT) 8.8 MG/5ML syrup, Administer 10 mL per G tube Every Night., Disp: , Rfl:     terazosin (HYTRIN) 5 MG capsule, Administer 1 capsule per G tube Every 12 (Twelve) Hours., Disp: , Rfl:     timolol (TIMOPTIC) 0.5 % ophthalmic solution, Administer 1 drop to both eyes 2 (Two) Times a Day., Disp: , Rfl:     Valproic Acid (DEPAKENE) 250 MG/5ML solution syrup, 3 mL by Per PEG Tube route Every 12 (Twelve) Hours., Disp: , Rfl:     donepezil (Aricept) 10 MG tablet, Take 1 tablet by mouth Every Night., Disp: 30 tablet, Rfl: 11   Past Medical History:   Diagnosis Date    Anemia     Dementia     Diabetes mellitus     Dysphagia     GERD (gastroesophageal reflux disease)     History of alcohol abuse     History of cocaine use     History of marijuana use     Hypertension     Osteomyelitis      "Poor historian     records obtained from nursing home records & his family    Visual impairment       Past Surgical History:   Procedure Laterality Date    AMPUTATION FOOT Left 10/18/2022    Procedure: PARTIAL FIRST RAY AMPUTATION LEFT;  Surgeon: Yeison Petty MD;  Location:  [facility];  Service: Orthopedics;  Laterality: Left;    AMPUTATION FOOT Left 2022    Procedure: Transmetatarsal of Left Foot;  Surgeon: Yeison Petty MD;  Location:  [facility];  Service: Orthopedics;  Laterality: Left;    ENDOSCOPY N/A 3/14/2024    Procedure: ESOPHAGOGASTRODUODENOSCOPY WITH JEJUNAL TUBE INSERTION AT BEDSIDE;  Surgeon: Brunner, Mark I, MD;  Location:  [facility];  Service: Gastroenterology;  Laterality: N/A;    ENDOSCOPY W/ PEG TUBE PLACEMENT N/A 2024    Procedure: ESOPHAGOGASTRODUODENOSCOPY WITH PERCUTANEOUS ENDOSCOPIC GASTROSTOMY TUBE INSERTION;  Surgeon: Brunner, Mark I, MD;  Location:  [facility];  Service: Gastroenterology;  Laterality: N/A;  EGD with PEG placement.  Secured at 3.5 cm    EYE SURGERY        Family History   Problem Relation Age of Onset    Diabetes Mother     Hypertension Mother     Hypertension Father     Diabetes Father     Diabetes Sister     Hypertension Brother     Diabetes Brother     Diabetes Maternal Grandmother     Diabetes Maternal Grandfather     Hypertension Brother     Diabetes Brother       Social History     Socioeconomic History    Marital status: Single   Tobacco Use    Smoking status: Former     Current packs/day: 0.00     Average packs/day: 1 pack/day for 40.0 years (40.0 ttl pk-yrs)     Types: Cigarettes     Start date: 1977     Quit date: 2017     Years since quittin.5     Passive exposure: Past    Smokeless tobacco: Never   Vaping Use    Vaping status: Never Used   Substance and Sexual Activity    Alcohol use: Not Currently     Comment: histgry of alcohol abuse    Drug use: Yes     Types: Marijuana, \"Crack\" cocaine     Comment: PATIENT STATES \"IT'S BEEN A " "FEW DAYS\"    Sexual activity: Defer     Review of Systems   Eyes:  Positive for visual disturbance.   Musculoskeletal:  Positive for gait problem.   Neurological:  Positive for tremors and memory problem.   All other systems reviewed and are negative.      Objective:    /66   Pulse 67   Ht 170.2 cm (67\")   Wt 80.3 kg (177 lb)   SpO2 95%   BMI 27.72 kg/m²     Neurology Exam:    General apperance: Somnolent    Mental status: Alert, awake and oriented to  person.  Could tell me the month, year, name of nursing home ([place] [facility]) and his daughter's name today    Recent and Remote memory: Significantly impaired.  MMSE of 4/24 with a delayed recall of 0/3 (could not carry out a few questions due to visual impairment)    Attention span and Concentration: Reduced    Language and Speech: Intact-mild dysarthria.    Fluency, Naming , Repitition and Comprehension:  Intact    Cranial Nerves:   CN II: Visual fields-completely blind in right eye.  Could count fingers up close in the left eye.  Pupils - TRUNG  CN III, IV and VI: Extraocular movements are intact. Normal saccades.   CN V: Facial sensation is intact.   CN VII: Muscles of facial expression reveal no asymmetry. Intact.   CN VIII: Hearing is intact. Whispered voice intact.   CN IX and X: Palate elevates symmetrically. Intact  CN XI: Shoulder shrug is intact.   CN XII: Tongue is midline without evidence of atrophy or fasciculation.     Ophthalmoscopic exam of optic disc-deferred    Motor: Bilateral postural tremors noted    Right UE muscle strength 5/5. Normal tone.     Left UE muscle strength 5/5.  Mildly increased tone     Right LE muscle strength5/5. Normal tone.     Left LE muscle strength 5/5. Normal tone.      Sensory: Normal light touch, vibration and pinprick sensation bilaterally.    DTRs: 2+ bilaterally in upper and lower extremities.    Babinski: Negative bilaterally.    Co-ordination: Normal finger-to-nose, heel to shin B/L.    Rhomberg: " Deferred    Gait: Wheelchair-bound.  Previously-could get up and take a few steps with two-person assist but kept leaning backwards    Cardiovascular: Regular rate and rhythm without murmur, gallop or rub.    Assessment and Plan:  1. Dementia with psychotic disturbance, unspecified dementia severity, unspecified dementia type  I will increase his donepezil to 10 mg nightly (last GFR was normal)  He is Seroquel and Abilify were stopped recently due to increased somnolence.  His visual hallucinations could also be due to bilateral vision loss  For now he can continue Depakote for his mood 150 mg twice daily through PEG tube  I have told him to start PT  Of note-the nursing home should also monitor his blood pressure regularly and reduce his dose of one of his antihypertensives if it is consistently low.  He is on high doses of clonidine in addition to follow-up antihypertensives which could also make him lethargic    Return in about 9 months (around 9/2/2025).     I spent over 25 minutes with the patient face to face out of which over 50% (20 minutes) was spent in management, instructions and education.     Farzana Easley MD

## 2024-12-04 LAB
MYCOBACTERIUM SPEC CULT: NORMAL
NIGHT BLUE STAIN TISS: NORMAL

## 2024-12-11 LAB
MYCOBACTERIUM SPEC CULT: NORMAL
NIGHT BLUE STAIN TISS: NORMAL

## 2024-12-18 LAB
MYCOBACTERIUM SPEC CULT: NORMAL
NIGHT BLUE STAIN TISS: NORMAL

## 2024-12-25 LAB
MYCOBACTERIUM SPEC CULT: NORMAL
NIGHT BLUE STAIN TISS: NORMAL

## 2025-01-01 ENCOUNTER — APPOINTMENT (OUTPATIENT)
Dept: CT IMAGING | Facility: HOSPITAL | Age: 70
DRG: 207 | End: 2025-01-01
Payer: MEDICARE

## 2025-01-01 ENCOUNTER — APPOINTMENT (OUTPATIENT)
Dept: GENERAL RADIOLOGY | Facility: HOSPITAL | Age: 70
DRG: 207 | End: 2025-01-01
Payer: MEDICARE

## 2025-01-01 ENCOUNTER — HOSPITAL ENCOUNTER (INPATIENT)
Facility: HOSPITAL | Age: 70
LOS: 8 days | DRG: 207 | End: 2025-04-06
Attending: EMERGENCY MEDICINE | Admitting: STUDENT IN AN ORGANIZED HEALTH CARE EDUCATION/TRAINING PROGRAM
Payer: MEDICARE

## 2025-01-01 ENCOUNTER — APPOINTMENT (OUTPATIENT)
Dept: PULMONOLOGY | Facility: HOSPITAL | Age: 70
DRG: 207 | End: 2025-01-01
Payer: MEDICARE

## 2025-01-01 VITALS
WEIGHT: 204.37 LBS | TEMPERATURE: 97.7 F | RESPIRATION RATE: 16 BRPM | SYSTOLIC BLOOD PRESSURE: 135 MMHG | OXYGEN SATURATION: 96 % | BODY MASS INDEX: 27.68 KG/M2 | HEIGHT: 72 IN | DIASTOLIC BLOOD PRESSURE: 50 MMHG

## 2025-01-01 DIAGNOSIS — K92.2 GASTROINTESTINAL HEMORRHAGE, UNSPECIFIED GASTROINTESTINAL HEMORRHAGE TYPE: ICD-10-CM

## 2025-01-01 DIAGNOSIS — E87.5 HYPERKALEMIA: ICD-10-CM

## 2025-01-01 DIAGNOSIS — J18.9 PNEUMONIA OF BOTH LOWER LOBES DUE TO INFECTIOUS ORGANISM: ICD-10-CM

## 2025-01-01 DIAGNOSIS — J96.01: ICD-10-CM

## 2025-01-01 DIAGNOSIS — J98.11 ATELECTASIS: ICD-10-CM

## 2025-01-01 DIAGNOSIS — J96.01 ACUTE RESPIRATORY FAILURE WITH HYPOXIA: Primary | ICD-10-CM

## 2025-01-01 DIAGNOSIS — E87.1 HYPONATREMIA: ICD-10-CM

## 2025-01-01 DIAGNOSIS — K92.1 BLOODY STOOLS: ICD-10-CM

## 2025-01-01 DIAGNOSIS — D64.9 ANEMIA, UNSPECIFIED TYPE: ICD-10-CM

## 2025-01-01 LAB
ABO GROUP BLD: NORMAL
ALBUMIN SERPL-MCNC: 3.1 G/DL (ref 3.5–5.2)
ALBUMIN SERPL-MCNC: 3.1 G/DL (ref 3.5–5.2)
ALBUMIN SERPL-MCNC: 3.2 G/DL (ref 3.5–5.2)
ALBUMIN SERPL-MCNC: 3.4 G/DL (ref 3.5–5.2)
ALBUMIN SERPL-MCNC: 3.8 G/DL (ref 3.5–5.2)
ALBUMIN/GLOB SERPL: 1 G/DL
ALBUMIN/GLOB SERPL: 1 G/DL
ALBUMIN/GLOB SERPL: 1.1 G/DL
ALP SERPL-CCNC: 102 U/L (ref 39–117)
ALP SERPL-CCNC: 121 U/L (ref 39–117)
ALP SERPL-CCNC: 92 U/L (ref 39–117)
ALT SERPL W P-5'-P-CCNC: 26 U/L (ref 1–41)
ALT SERPL W P-5'-P-CCNC: 27 U/L (ref 1–41)
ALT SERPL W P-5'-P-CCNC: 29 U/L (ref 1–41)
ANION GAP SERPL CALCULATED.3IONS-SCNC: 10 MMOL/L (ref 5–15)
ANION GAP SERPL CALCULATED.3IONS-SCNC: 12 MMOL/L (ref 5–15)
ANION GAP SERPL CALCULATED.3IONS-SCNC: 13 MMOL/L (ref 5–15)
ANION GAP SERPL CALCULATED.3IONS-SCNC: 14 MMOL/L (ref 5–15)
ANION GAP SERPL CALCULATED.3IONS-SCNC: 14 MMOL/L (ref 5–15)
ANION GAP SERPL CALCULATED.3IONS-SCNC: 9 MMOL/L (ref 5–15)
ARTERIAL PATENCY WRIST A: ABNORMAL
ARTERIAL PATENCY WRIST A: POSITIVE
AST SERPL-CCNC: 28 U/L (ref 1–40)
AST SERPL-CCNC: 30 U/L (ref 1–40)
AST SERPL-CCNC: 31 U/L (ref 1–40)
ATMOSPHERIC PRESS: ABNORMAL MM[HG]
B PARAPERT DNA SPEC QL NAA+PROBE: NOT DETECTED
B PERT DNA SPEC QL NAA+PROBE: NOT DETECTED
BACTERIA SPEC AEROBE CULT: ABNORMAL
BACTERIA SPEC AEROBE CULT: NORMAL
BACTERIA SPEC AEROBE CULT: NORMAL
BACTERIA SPEC RESP CULT: NO GROWTH
BACTERIA SPEC RESP CULT: NORMAL
BACTERIA UR QL AUTO: ABNORMAL /HPF
BASE EXCESS BLDA CALC-SCNC: -1 MMOL/L (ref 0–2)
BASE EXCESS BLDA CALC-SCNC: -1.5 MMOL/L (ref 0–2)
BASE EXCESS BLDA CALC-SCNC: -1.8 MMOL/L (ref 0–2)
BASE EXCESS BLDA CALC-SCNC: -2 MMOL/L (ref 0–2)
BASE EXCESS BLDA CALC-SCNC: -2.3 MMOL/L (ref 0–2)
BASE EXCESS BLDA CALC-SCNC: -3.1 MMOL/L (ref 0–2)
BASE EXCESS BLDA CALC-SCNC: -3.6 MMOL/L (ref 0–2)
BASE EXCESS BLDA CALC-SCNC: -4.8 MMOL/L (ref 0–2)
BASE EXCESS BLDA CALC-SCNC: -5.2 MMOL/L (ref 0–2)
BASE EXCESS BLDA CALC-SCNC: 0.9 MMOL/L (ref 0–2)
BASE EXCESS BLDV CALC-SCNC: -6.9 MMOL/L (ref -2–2)
BASOPHILS # BLD AUTO: 0.01 10*3/MM3 (ref 0–0.2)
BASOPHILS # BLD AUTO: 0.01 10*3/MM3 (ref 0–0.2)
BASOPHILS # BLD AUTO: 0.03 10*3/MM3 (ref 0–0.2)
BASOPHILS # BLD AUTO: 0.04 10*3/MM3 (ref 0–0.2)
BASOPHILS # BLD AUTO: 0.05 10*3/MM3 (ref 0–0.2)
BASOPHILS # BLD AUTO: 0.05 10*3/MM3 (ref 0–0.2)
BASOPHILS NFR BLD AUTO: 0.1 % (ref 0–1.5)
BASOPHILS NFR BLD AUTO: 0.2 % (ref 0–1.5)
BASOPHILS NFR BLD AUTO: 0.3 % (ref 0–1.5)
BASOPHILS NFR BLD AUTO: 0.4 % (ref 0–1.5)
BASOPHILS NFR BLD AUTO: 0.5 % (ref 0–1.5)
BDY SITE: ABNORMAL
BH BB BLOOD EXPIRATION DATE: NORMAL
BH BB BLOOD TYPE BARCODE: 5100
BH BB DISPENSE STATUS: NORMAL
BH BB PRODUCT CODE: NORMAL
BH BB UNIT NUMBER: NORMAL
BILIRUB SERPL-MCNC: 0.2 MG/DL (ref 0–1.2)
BILIRUB SERPL-MCNC: 0.6 MG/DL (ref 0–1.2)
BILIRUB SERPL-MCNC: 0.9 MG/DL (ref 0–1.2)
BILIRUB UR QL STRIP: NEGATIVE
BLD GP AB SCN SERPL QL: NEGATIVE
BODY TEMPERATURE: 37
BUN SERPL-MCNC: 111 MG/DL (ref 8–23)
BUN SERPL-MCNC: 26 MG/DL (ref 8–23)
BUN SERPL-MCNC: 32 MG/DL (ref 8–23)
BUN SERPL-MCNC: 35 MG/DL (ref 8–23)
BUN SERPL-MCNC: 36 MG/DL (ref 8–23)
BUN SERPL-MCNC: 39 MG/DL (ref 8–23)
BUN SERPL-MCNC: 41 MG/DL (ref 8–23)
BUN SERPL-MCNC: 43 MG/DL (ref 8–23)
BUN SERPL-MCNC: 46 MG/DL (ref 8–23)
BUN SERPL-MCNC: 59 MG/DL (ref 8–23)
BUN SERPL-MCNC: 85 MG/DL (ref 8–23)
BUN/CREAT SERPL: 20 (ref 7–25)
BUN/CREAT SERPL: 20.3 (ref 7–25)
BUN/CREAT SERPL: 20.8 (ref 7–25)
BUN/CREAT SERPL: 22 (ref 7–25)
BUN/CREAT SERPL: 23 (ref 7–25)
BUN/CREAT SERPL: 24 (ref 7–25)
BUN/CREAT SERPL: 25.2 (ref 7–25)
BUN/CREAT SERPL: 25.9 (ref 7–25)
BUN/CREAT SERPL: 26.1 (ref 7–25)
BUN/CREAT SERPL: 28.9 (ref 7–25)
BUN/CREAT SERPL: 29.9 (ref 7–25)
C PNEUM DNA NPH QL NAA+NON-PROBE: NOT DETECTED
CALCIUM SPEC-SCNC: 8.5 MG/DL (ref 8.6–10.5)
CALCIUM SPEC-SCNC: 8.6 MG/DL (ref 8.6–10.5)
CALCIUM SPEC-SCNC: 8.6 MG/DL (ref 8.6–10.5)
CALCIUM SPEC-SCNC: 8.7 MG/DL (ref 8.6–10.5)
CALCIUM SPEC-SCNC: 8.9 MG/DL (ref 8.6–10.5)
CALCIUM SPEC-SCNC: 9.1 MG/DL (ref 8.6–10.5)
CALCIUM SPEC-SCNC: 9.1 MG/DL (ref 8.6–10.5)
CALCIUM SPEC-SCNC: 9.2 MG/DL (ref 8.6–10.5)
CALCIUM SPEC-SCNC: 9.3 MG/DL (ref 8.6–10.5)
CHLORIDE SERPL-SCNC: 102 MMOL/L (ref 98–107)
CHLORIDE SERPL-SCNC: 106 MMOL/L (ref 98–107)
CHLORIDE SERPL-SCNC: 106 MMOL/L (ref 98–107)
CHLORIDE SERPL-SCNC: 107 MMOL/L (ref 98–107)
CHLORIDE SERPL-SCNC: 108 MMOL/L (ref 98–107)
CHLORIDE SERPL-SCNC: 91 MMOL/L (ref 98–107)
CHLORIDE SERPL-SCNC: 91 MMOL/L (ref 98–107)
CHLORIDE SERPL-SCNC: 94 MMOL/L (ref 98–107)
CHLORIDE SERPL-SCNC: 95 MMOL/L (ref 98–107)
CLARITY UR: ABNORMAL
CO2 BLDA-SCNC: 19.7 MMOL/L (ref 22–33)
CO2 BLDA-SCNC: 21.3 MMOL/L (ref 22–33)
CO2 BLDA-SCNC: 22.4 MMOL/L (ref 22–33)
CO2 BLDA-SCNC: 22.7 MMOL/L (ref 22–33)
CO2 BLDA-SCNC: 23 MMOL/L (ref 22–33)
CO2 BLDA-SCNC: 24.1 MMOL/L (ref 22–33)
CO2 BLDA-SCNC: 25 MMOL/L (ref 22–33)
CO2 BLDA-SCNC: 25.2 MMOL/L (ref 22–33)
CO2 BLDA-SCNC: 25.3 MMOL/L (ref 22–33)
CO2 BLDA-SCNC: 25.4 MMOL/L (ref 22–33)
CO2 BLDA-SCNC: 26.6 MMOL/L (ref 22–33)
CO2 SERPL-SCNC: 16 MMOL/L (ref 22–29)
CO2 SERPL-SCNC: 17 MMOL/L (ref 22–29)
CO2 SERPL-SCNC: 18 MMOL/L (ref 22–29)
CO2 SERPL-SCNC: 19 MMOL/L (ref 22–29)
CO2 SERPL-SCNC: 20 MMOL/L (ref 22–29)
CO2 SERPL-SCNC: 21 MMOL/L (ref 22–29)
CO2 SERPL-SCNC: 22 MMOL/L (ref 22–29)
CO2 SERPL-SCNC: 22 MMOL/L (ref 22–29)
CO2 SERPL-SCNC: 23 MMOL/L (ref 22–29)
COHGB MFR BLD: 1.3 % (ref 0–2)
COHGB MFR BLD: 1.4 % (ref 0–2)
COHGB MFR BLD: 1.5 % (ref 0–2)
COHGB MFR BLD: 1.7 % (ref 0–2)
COHGB MFR BLD: 1.8 %
COHGB MFR BLD: 2 % (ref 0–2)
COLOR UR: YELLOW
CREAT SERPL-MCNC: 1.28 MG/DL (ref 0.76–1.27)
CREAT SERPL-MCNC: 1.38 MG/DL (ref 0.76–1.27)
CREAT SERPL-MCNC: 1.49 MG/DL (ref 0.76–1.27)
CREAT SERPL-MCNC: 1.54 MG/DL (ref 0.76–1.27)
CREAT SERPL-MCNC: 1.58 MG/DL (ref 0.76–1.27)
CREAT SERPL-MCNC: 1.6 MG/DL (ref 0.76–1.27)
CREAT SERPL-MCNC: 1.68 MG/DL (ref 0.76–1.27)
CREAT SERPL-MCNC: 1.77 MG/DL (ref 0.76–1.27)
CREAT SERPL-MCNC: 2.34 MG/DL (ref 0.76–1.27)
CREAT SERPL-MCNC: 3.54 MG/DL (ref 0.76–1.27)
CREAT SERPL-MCNC: 4.82 MG/DL (ref 0.76–1.27)
CROSSMATCH INTERPRETATION: NORMAL
CRP SERPL-MCNC: 15.85 MG/DL (ref 0–0.5)
D-LACTATE SERPL-SCNC: 0.7 MMOL/L (ref 0.5–2)
DEPRECATED RDW RBC AUTO: 46.7 FL (ref 37–54)
DEPRECATED RDW RBC AUTO: 46.7 FL (ref 37–54)
DEPRECATED RDW RBC AUTO: 49.1 FL (ref 37–54)
DEPRECATED RDW RBC AUTO: 50.4 FL (ref 37–54)
DEPRECATED RDW RBC AUTO: 50.8 FL (ref 37–54)
DEPRECATED RDW RBC AUTO: 52.6 FL (ref 37–54)
DEPRECATED RDW RBC AUTO: 54.1 FL (ref 37–54)
DEPRECATED RDW RBC AUTO: 54.3 FL (ref 37–54)
DEPRECATED RDW RBC AUTO: 54.8 FL (ref 37–54)
DEPRECATED RDW RBC AUTO: 59 FL (ref 37–54)
EGFRCR SERPLBLD CKD-EPI 2021: 12.5 ML/MIN/1.73
EGFRCR SERPLBLD CKD-EPI 2021: 18.1 ML/MIN/1.73
EGFRCR SERPLBLD CKD-EPI 2021: 29.7 ML/MIN/1.73
EGFRCR SERPLBLD CKD-EPI 2021: 41.6 ML/MIN/1.73
EGFRCR SERPLBLD CKD-EPI 2021: 44.3 ML/MIN/1.73
EGFRCR SERPLBLD CKD-EPI 2021: 46.9 ML/MIN/1.73
EGFRCR SERPLBLD CKD-EPI 2021: 47.6 ML/MIN/1.73
EGFRCR SERPLBLD CKD-EPI 2021: 49.1 ML/MIN/1.73
EGFRCR SERPLBLD CKD-EPI 2021: 51.1 ML/MIN/1.73
EGFRCR SERPLBLD CKD-EPI 2021: 56 ML/MIN/1.73
EGFRCR SERPLBLD CKD-EPI 2021: 61.3 ML/MIN/1.73
EOSINOPHIL # BLD AUTO: 0.01 10*3/MM3 (ref 0–0.4)
EOSINOPHIL # BLD AUTO: 0.02 10*3/MM3 (ref 0–0.4)
EOSINOPHIL # BLD AUTO: 0.22 10*3/MM3 (ref 0–0.4)
EOSINOPHIL # BLD AUTO: 0.28 10*3/MM3 (ref 0–0.4)
EOSINOPHIL # BLD AUTO: 0.28 10*3/MM3 (ref 0–0.4)
EOSINOPHIL # BLD AUTO: 0.32 10*3/MM3 (ref 0–0.4)
EOSINOPHIL # BLD AUTO: 0.33 10*3/MM3 (ref 0–0.4)
EOSINOPHIL # BLD AUTO: 0.33 10*3/MM3 (ref 0–0.4)
EOSINOPHIL NFR BLD AUTO: 0.1 % (ref 0.3–6.2)
EOSINOPHIL NFR BLD AUTO: 0.3 % (ref 0.3–6.2)
EOSINOPHIL NFR BLD AUTO: 2 % (ref 0.3–6.2)
EOSINOPHIL NFR BLD AUTO: 2.4 % (ref 0.3–6.2)
EOSINOPHIL NFR BLD AUTO: 2.5 % (ref 0.3–6.2)
EOSINOPHIL NFR BLD AUTO: 2.7 % (ref 0.3–6.2)
EOSINOPHIL NFR BLD AUTO: 3 % (ref 0.3–6.2)
EOSINOPHIL NFR BLD AUTO: 4 % (ref 0.3–6.2)
EPAP: 0
ERYTHROCYTE [DISTWIDTH] IN BLOOD BY AUTOMATED COUNT: 15.2 % (ref 12.3–15.4)
ERYTHROCYTE [DISTWIDTH] IN BLOOD BY AUTOMATED COUNT: 15.3 % (ref 12.3–15.4)
ERYTHROCYTE [DISTWIDTH] IN BLOOD BY AUTOMATED COUNT: 15.3 % (ref 12.3–15.4)
ERYTHROCYTE [DISTWIDTH] IN BLOOD BY AUTOMATED COUNT: 15.7 % (ref 12.3–15.4)
ERYTHROCYTE [DISTWIDTH] IN BLOOD BY AUTOMATED COUNT: 15.9 % (ref 12.3–15.4)
ERYTHROCYTE [DISTWIDTH] IN BLOOD BY AUTOMATED COUNT: 16 % (ref 12.3–15.4)
ERYTHROCYTE [DISTWIDTH] IN BLOOD BY AUTOMATED COUNT: 16.1 % (ref 12.3–15.4)
ERYTHROCYTE [DISTWIDTH] IN BLOOD BY AUTOMATED COUNT: 16.3 % (ref 12.3–15.4)
ERYTHROCYTE [DISTWIDTH] IN BLOOD BY AUTOMATED COUNT: 16.3 % (ref 12.3–15.4)
ERYTHROCYTE [DISTWIDTH] IN BLOOD BY AUTOMATED COUNT: 16.7 % (ref 12.3–15.4)
FLUAV RNA RESP QL NAA+PROBE: NOT DETECTED
FLUAV SUBTYP SPEC NAA+PROBE: NOT DETECTED
FLUBV RNA ISLT QL NAA+PROBE: NOT DETECTED
FLUBV RNA RESP QL NAA+PROBE: NOT DETECTED
GEN 5 1HR TROPONIN T REFLEX: 100 NG/L
GIE STN SPEC: NORMAL
GLOBULIN UR ELPH-MCNC: 3 GM/DL
GLOBULIN UR ELPH-MCNC: 3.4 GM/DL
GLOBULIN UR ELPH-MCNC: 3.7 GM/DL
GLUCOSE BLDC GLUCOMTR-MCNC: 106 MG/DL (ref 70–130)
GLUCOSE BLDC GLUCOMTR-MCNC: 109 MG/DL (ref 70–130)
GLUCOSE BLDC GLUCOMTR-MCNC: 110 MG/DL (ref 70–130)
GLUCOSE BLDC GLUCOMTR-MCNC: 113 MG/DL (ref 70–130)
GLUCOSE BLDC GLUCOMTR-MCNC: 116 MG/DL (ref 70–130)
GLUCOSE BLDC GLUCOMTR-MCNC: 118 MG/DL (ref 70–130)
GLUCOSE BLDC GLUCOMTR-MCNC: 120 MG/DL (ref 70–130)
GLUCOSE BLDC GLUCOMTR-MCNC: 121 MG/DL (ref 70–130)
GLUCOSE BLDC GLUCOMTR-MCNC: 123 MG/DL (ref 70–130)
GLUCOSE BLDC GLUCOMTR-MCNC: 123 MG/DL (ref 70–130)
GLUCOSE BLDC GLUCOMTR-MCNC: 124 MG/DL (ref 70–130)
GLUCOSE BLDC GLUCOMTR-MCNC: 125 MG/DL (ref 70–130)
GLUCOSE BLDC GLUCOMTR-MCNC: 125 MG/DL (ref 70–130)
GLUCOSE BLDC GLUCOMTR-MCNC: 126 MG/DL (ref 70–130)
GLUCOSE BLDC GLUCOMTR-MCNC: 126 MG/DL (ref 70–130)
GLUCOSE BLDC GLUCOMTR-MCNC: 128 MG/DL (ref 70–130)
GLUCOSE BLDC GLUCOMTR-MCNC: 129 MG/DL (ref 70–130)
GLUCOSE BLDC GLUCOMTR-MCNC: 133 MG/DL (ref 70–130)
GLUCOSE BLDC GLUCOMTR-MCNC: 134 MG/DL (ref 70–130)
GLUCOSE BLDC GLUCOMTR-MCNC: 144 MG/DL (ref 70–130)
GLUCOSE BLDC GLUCOMTR-MCNC: 145 MG/DL (ref 70–130)
GLUCOSE BLDC GLUCOMTR-MCNC: 147 MG/DL (ref 70–130)
GLUCOSE BLDC GLUCOMTR-MCNC: 152 MG/DL (ref 70–130)
GLUCOSE BLDC GLUCOMTR-MCNC: 152 MG/DL (ref 70–130)
GLUCOSE BLDC GLUCOMTR-MCNC: 155 MG/DL (ref 70–130)
GLUCOSE BLDC GLUCOMTR-MCNC: 155 MG/DL (ref 70–130)
GLUCOSE BLDC GLUCOMTR-MCNC: 161 MG/DL (ref 70–130)
GLUCOSE BLDC GLUCOMTR-MCNC: 169 MG/DL (ref 70–130)
GLUCOSE BLDC GLUCOMTR-MCNC: 172 MG/DL (ref 70–130)
GLUCOSE BLDC GLUCOMTR-MCNC: 54 MG/DL (ref 70–130)
GLUCOSE BLDC GLUCOMTR-MCNC: 54 MG/DL (ref 70–130)
GLUCOSE BLDC GLUCOMTR-MCNC: 67 MG/DL (ref 70–130)
GLUCOSE BLDC GLUCOMTR-MCNC: 69 MG/DL (ref 70–130)
GLUCOSE BLDC GLUCOMTR-MCNC: 77 MG/DL (ref 70–130)
GLUCOSE BLDC GLUCOMTR-MCNC: 85 MG/DL (ref 70–130)
GLUCOSE BLDC GLUCOMTR-MCNC: 91 MG/DL (ref 70–130)
GLUCOSE BLDC GLUCOMTR-MCNC: 92 MG/DL (ref 70–130)
GLUCOSE BLDC GLUCOMTR-MCNC: 95 MG/DL (ref 70–130)
GLUCOSE BLDC GLUCOMTR-MCNC: 99 MG/DL (ref 70–130)
GLUCOSE SERPL-MCNC: 103 MG/DL (ref 65–99)
GLUCOSE SERPL-MCNC: 120 MG/DL (ref 65–99)
GLUCOSE SERPL-MCNC: 125 MG/DL (ref 65–99)
GLUCOSE SERPL-MCNC: 131 MG/DL (ref 65–99)
GLUCOSE SERPL-MCNC: 149 MG/DL (ref 65–99)
GLUCOSE SERPL-MCNC: 65 MG/DL (ref 65–99)
GLUCOSE SERPL-MCNC: 68 MG/DL (ref 65–99)
GLUCOSE SERPL-MCNC: 69 MG/DL (ref 65–99)
GLUCOSE SERPL-MCNC: 89 MG/DL (ref 65–99)
GLUCOSE SERPL-MCNC: 93 MG/DL (ref 65–99)
GLUCOSE SERPL-MCNC: 97 MG/DL (ref 65–99)
GLUCOSE UR STRIP-MCNC: NEGATIVE MG/DL
GRAM STN SPEC: NORMAL
HADV DNA SPEC NAA+PROBE: NOT DETECTED
HBA1C MFR BLD: 5.6 % (ref 4.8–5.6)
HCO3 BLDA-SCNC: 20.2 MMOL/L (ref 20–26)
HCO3 BLDA-SCNC: 21.3 MMOL/L (ref 20–26)
HCO3 BLDA-SCNC: 21.5 MMOL/L (ref 20–26)
HCO3 BLDA-SCNC: 22 MMOL/L (ref 20–26)
HCO3 BLDA-SCNC: 22.7 MMOL/L (ref 20–26)
HCO3 BLDA-SCNC: 23.5 MMOL/L (ref 20–26)
HCO3 BLDA-SCNC: 24 MMOL/L (ref 20–26)
HCO3 BLDA-SCNC: 24 MMOL/L (ref 20–26)
HCO3 BLDA-SCNC: 24.2 MMOL/L (ref 20–26)
HCO3 BLDA-SCNC: 25.2 MMOL/L (ref 20–26)
HCO3 BLDV-SCNC: 18.6 MMOL/L (ref 22–28)
HCOV 229E RNA SPEC QL NAA+PROBE: DETECTED
HCOV HKU1 RNA SPEC QL NAA+PROBE: NOT DETECTED
HCOV NL63 RNA SPEC QL NAA+PROBE: NOT DETECTED
HCOV OC43 RNA SPEC QL NAA+PROBE: NOT DETECTED
HCT VFR BLD AUTO: 19.4 % (ref 37.5–51)
HCT VFR BLD AUTO: 21.1 % (ref 37.5–51)
HCT VFR BLD AUTO: 22.6 % (ref 37.5–51)
HCT VFR BLD AUTO: 23.7 % (ref 37.5–51)
HCT VFR BLD AUTO: 23.9 % (ref 37.5–51)
HCT VFR BLD AUTO: 24 % (ref 37.5–51)
HCT VFR BLD AUTO: 24 % (ref 37.5–51)
HCT VFR BLD AUTO: 25.7 % (ref 37.5–51)
HCT VFR BLD AUTO: 26 % (ref 37.5–51)
HCT VFR BLD AUTO: 26.1 % (ref 37.5–51)
HCT VFR BLD AUTO: 26.9 % (ref 37.5–51)
HCT VFR BLD AUTO: 27.6 % (ref 37.5–51)
HCT VFR BLD AUTO: 27.7 % (ref 37.5–51)
HCT VFR BLD AUTO: 27.7 % (ref 37.5–51)
HCT VFR BLD AUTO: 29.3 % (ref 37.5–51)
HCT VFR BLD AUTO: 30.6 % (ref 37.5–51)
HCT VFR BLD CALC: 21.6 % (ref 38–51)
HCT VFR BLD CALC: 24.6 % (ref 38–51)
HCT VFR BLD CALC: 24.9 % (ref 38–51)
HCT VFR BLD CALC: 25.5 % (ref 38–51)
HCT VFR BLD CALC: 26 % (ref 38–51)
HCT VFR BLD CALC: 26.5 % (ref 38–51)
HCT VFR BLD CALC: 26.8 % (ref 38–51)
HCT VFR BLD CALC: 28.3 % (ref 38–51)
HCT VFR BLD CALC: 28.6 % (ref 38–51)
HCT VFR BLD CALC: 28.7 % (ref 38–51)
HGB BLD-MCNC: 6.3 G/DL (ref 13–17.7)
HGB BLD-MCNC: 6.6 G/DL (ref 13–17.7)
HGB BLD-MCNC: 7.3 G/DL (ref 13–17.7)
HGB BLD-MCNC: 7.7 G/DL (ref 13–17.7)
HGB BLD-MCNC: 7.8 G/DL (ref 13–17.7)
HGB BLD-MCNC: 7.8 G/DL (ref 13–17.7)
HGB BLD-MCNC: 7.9 G/DL (ref 13–17.7)
HGB BLD-MCNC: 8 G/DL (ref 13–17.7)
HGB BLD-MCNC: 8.2 G/DL (ref 13–17.7)
HGB BLD-MCNC: 8.2 G/DL (ref 13–17.7)
HGB BLD-MCNC: 8.3 G/DL (ref 13–17.7)
HGB BLD-MCNC: 8.4 G/DL (ref 13–17.7)
HGB BLD-MCNC: 8.4 G/DL (ref 13–17.7)
HGB BLD-MCNC: 8.7 G/DL (ref 13–17.7)
HGB BLD-MCNC: 9.1 G/DL (ref 13–17.7)
HGB BLD-MCNC: 9.4 G/DL (ref 13–17.7)
HGB BLDA-MCNC: 7.1 G/DL (ref 13.5–17.5)
HGB BLDA-MCNC: 7.1 G/DL (ref 13.5–17.5)
HGB BLDA-MCNC: 8 G/DL (ref 13.5–17.5)
HGB BLDA-MCNC: 8.1 G/DL (ref 13.5–17.5)
HGB BLDA-MCNC: 8.3 G/DL (ref 13.5–17.5)
HGB BLDA-MCNC: 8.5 G/DL (ref 13.5–17.5)
HGB BLDA-MCNC: 8.7 G/DL (ref 13.5–17.5)
HGB BLDA-MCNC: 8.7 G/DL (ref 13.5–17.5)
HGB BLDA-MCNC: 9.2 G/DL (ref 13.5–17.5)
HGB BLDA-MCNC: 9.3 G/DL (ref 13.5–17.5)
HGB BLDA-MCNC: 9.4 G/DL (ref 13.5–17.5)
HGB UR QL STRIP.AUTO: ABNORMAL
HMPV RNA NPH QL NAA+NON-PROBE: NOT DETECTED
HOLD SPECIMEN: NORMAL
HPIV1 RNA ISLT QL NAA+PROBE: NOT DETECTED
HPIV2 RNA SPEC QL NAA+PROBE: NOT DETECTED
HPIV3 RNA NPH QL NAA+PROBE: NOT DETECTED
HPIV4 P GENE NPH QL NAA+PROBE: NOT DETECTED
HYALINE CASTS UR QL AUTO: ABNORMAL /LPF
IMM GRANULOCYTES # BLD AUTO: 0.02 10*3/MM3 (ref 0–0.05)
IMM GRANULOCYTES # BLD AUTO: 0.02 10*3/MM3 (ref 0–0.05)
IMM GRANULOCYTES # BLD AUTO: 0.04 10*3/MM3 (ref 0–0.05)
IMM GRANULOCYTES # BLD AUTO: 0.06 10*3/MM3 (ref 0–0.05)
IMM GRANULOCYTES # BLD AUTO: 0.07 10*3/MM3 (ref 0–0.05)
IMM GRANULOCYTES # BLD AUTO: 0.09 10*3/MM3 (ref 0–0.05)
IMM GRANULOCYTES # BLD AUTO: 0.09 10*3/MM3 (ref 0–0.05)
IMM GRANULOCYTES # BLD AUTO: 0.1 10*3/MM3 (ref 0–0.05)
IMM GRANULOCYTES NFR BLD AUTO: 0.3 % (ref 0–0.5)
IMM GRANULOCYTES NFR BLD AUTO: 0.3 % (ref 0–0.5)
IMM GRANULOCYTES NFR BLD AUTO: 0.4 % (ref 0–0.5)
IMM GRANULOCYTES NFR BLD AUTO: 0.6 % (ref 0–0.5)
IMM GRANULOCYTES NFR BLD AUTO: 0.7 % (ref 0–0.5)
IMM GRANULOCYTES NFR BLD AUTO: 0.7 % (ref 0–0.5)
IMM GRANULOCYTES NFR BLD AUTO: 0.8 % (ref 0–0.5)
IMM GRANULOCYTES NFR BLD AUTO: 0.9 % (ref 0–0.5)
INHALED O2 CONCENTRATION: 100 %
INHALED O2 CONCENTRATION: 21 %
INHALED O2 CONCENTRATION: 40 %
INHALED O2 CONCENTRATION: 50 %
IPAP: 0
KETONES UR QL STRIP: NEGATIVE
L PNEUMO1 AG UR QL IA: NEGATIVE
LEUKOCYTE ESTERASE UR QL STRIP.AUTO: ABNORMAL
LYMPHOCYTES # BLD AUTO: 0.59 10*3/MM3 (ref 0.7–3.1)
LYMPHOCYTES # BLD AUTO: 0.63 10*3/MM3 (ref 0.7–3.1)
LYMPHOCYTES # BLD AUTO: 0.96 10*3/MM3 (ref 0.7–3.1)
LYMPHOCYTES # BLD AUTO: 1.03 10*3/MM3 (ref 0.7–3.1)
LYMPHOCYTES # BLD AUTO: 1.24 10*3/MM3 (ref 0.7–3.1)
LYMPHOCYTES # BLD AUTO: 1.24 10*3/MM3 (ref 0.7–3.1)
LYMPHOCYTES # BLD AUTO: 1.53 10*3/MM3 (ref 0.7–3.1)
LYMPHOCYTES # BLD AUTO: 1.6 10*3/MM3 (ref 0.7–3.1)
LYMPHOCYTES NFR BLD AUTO: 11.5 % (ref 19.6–45.3)
LYMPHOCYTES NFR BLD AUTO: 12 % (ref 19.6–45.3)
LYMPHOCYTES NFR BLD AUTO: 12.4 % (ref 19.6–45.3)
LYMPHOCYTES NFR BLD AUTO: 12.5 % (ref 19.6–45.3)
LYMPHOCYTES NFR BLD AUTO: 13.4 % (ref 19.6–45.3)
LYMPHOCYTES NFR BLD AUTO: 7.9 % (ref 19.6–45.3)
LYMPHOCYTES NFR BLD AUTO: 8.7 % (ref 19.6–45.3)
LYMPHOCYTES NFR BLD AUTO: 9.4 % (ref 19.6–45.3)
M PNEUMO IGG SER IA-ACNC: NOT DETECTED
MAGNESIUM SERPL-MCNC: 2.2 MG/DL (ref 1.6–2.4)
MAGNESIUM SERPL-MCNC: 2.3 MG/DL (ref 1.6–2.4)
MAGNESIUM SERPL-MCNC: 2.4 MG/DL (ref 1.6–2.4)
MAGNESIUM SERPL-MCNC: 2.6 MG/DL (ref 1.6–2.4)
MCH RBC QN AUTO: 27.4 PG (ref 26.6–33)
MCH RBC QN AUTO: 27.6 PG (ref 26.6–33)
MCH RBC QN AUTO: 27.7 PG (ref 26.6–33)
MCH RBC QN AUTO: 27.8 PG (ref 26.6–33)
MCH RBC QN AUTO: 27.9 PG (ref 26.6–33)
MCH RBC QN AUTO: 28 PG (ref 26.6–33)
MCH RBC QN AUTO: 28.1 PG (ref 26.6–33)
MCH RBC QN AUTO: 28.1 PG (ref 26.6–33)
MCHC RBC AUTO-ENTMCNC: 29 G/DL (ref 31.5–35.7)
MCHC RBC AUTO-ENTMCNC: 30.3 G/DL (ref 31.5–35.7)
MCHC RBC AUTO-ENTMCNC: 30.5 G/DL (ref 31.5–35.7)
MCHC RBC AUTO-ENTMCNC: 30.7 G/DL (ref 31.5–35.7)
MCHC RBC AUTO-ENTMCNC: 31.1 G/DL (ref 31.5–35.7)
MCHC RBC AUTO-ENTMCNC: 31.3 G/DL (ref 31.5–35.7)
MCHC RBC AUTO-ENTMCNC: 31.4 G/DL (ref 31.5–35.7)
MCHC RBC AUTO-ENTMCNC: 32.3 G/DL (ref 31.5–35.7)
MCHC RBC AUTO-ENTMCNC: 32.5 G/DL (ref 31.5–35.7)
MCHC RBC AUTO-ENTMCNC: 32.5 G/DL (ref 31.5–35.7)
MCV RBC AUTO: 85.1 FL (ref 79–97)
MCV RBC AUTO: 85.1 FL (ref 79–97)
MCV RBC AUTO: 86.6 FL (ref 79–97)
MCV RBC AUTO: 87.6 FL (ref 79–97)
MCV RBC AUTO: 88.5 FL (ref 79–97)
MCV RBC AUTO: 88.8 FL (ref 79–97)
MCV RBC AUTO: 91.3 FL (ref 79–97)
MCV RBC AUTO: 91.4 FL (ref 79–97)
MCV RBC AUTO: 91.5 FL (ref 79–97)
MCV RBC AUTO: 96.8 FL (ref 79–97)
METHGB BLD QL: 0 % (ref 0–1.5)
METHGB BLD QL: 0 % (ref 0–1.5)
METHGB BLD QL: 0.1 % (ref 0–1.5)
METHGB BLD QL: 0.2 % (ref 0–1.5)
METHGB BLD QL: 0.3 %
METHGB BLD QL: 0.3 % (ref 0–1.5)
METHGB BLD QL: 0.6 % (ref 0–1.5)
MODALITY: ABNORMAL
MONOCYTES # BLD AUTO: 0.82 10*3/MM3 (ref 0.1–0.9)
MONOCYTES # BLD AUTO: 0.87 10*3/MM3 (ref 0.1–0.9)
MONOCYTES # BLD AUTO: 0.93 10*3/MM3 (ref 0.1–0.9)
MONOCYTES # BLD AUTO: 0.99 10*3/MM3 (ref 0.1–0.9)
MONOCYTES # BLD AUTO: 1.06 10*3/MM3 (ref 0.1–0.9)
MONOCYTES # BLD AUTO: 1.2 10*3/MM3 (ref 0.1–0.9)
MONOCYTES # BLD AUTO: 1.25 10*3/MM3 (ref 0.1–0.9)
MONOCYTES # BLD AUTO: 1.34 10*3/MM3 (ref 0.1–0.9)
MONOCYTES NFR BLD AUTO: 10.3 % (ref 5–12)
MONOCYTES NFR BLD AUTO: 10.6 % (ref 5–12)
MONOCYTES NFR BLD AUTO: 10.9 % (ref 5–12)
MONOCYTES NFR BLD AUTO: 13.6 % (ref 5–12)
MONOCYTES NFR BLD AUTO: 14.9 % (ref 5–12)
MONOCYTES NFR BLD AUTO: 9 % (ref 5–12)
MONOCYTES NFR BLD AUTO: 9 % (ref 5–12)
MONOCYTES NFR BLD AUTO: 9.9 % (ref 5–12)
MRSA DNA SPEC QL NAA+PROBE: NEGATIVE
NEUTROPHILS NFR BLD AUTO: 10.12 10*3/MM3 (ref 1.7–7)
NEUTROPHILS NFR BLD AUTO: 4.68 10*3/MM3 (ref 1.7–7)
NEUTROPHILS NFR BLD AUTO: 5.88 10*3/MM3 (ref 1.7–7)
NEUTROPHILS NFR BLD AUTO: 6.38 10*3/MM3 (ref 1.7–7)
NEUTROPHILS NFR BLD AUTO: 6.49 10*3/MM3 (ref 1.7–7)
NEUTROPHILS NFR BLD AUTO: 69.3 % (ref 42.7–76)
NEUTROPHILS NFR BLD AUTO: 71.7 % (ref 42.7–76)
NEUTROPHILS NFR BLD AUTO: 73 % (ref 42.7–76)
NEUTROPHILS NFR BLD AUTO: 74.9 % (ref 42.7–76)
NEUTROPHILS NFR BLD AUTO: 75.3 % (ref 42.7–76)
NEUTROPHILS NFR BLD AUTO: 75.5 % (ref 42.7–76)
NEUTROPHILS NFR BLD AUTO: 78.6 % (ref 42.7–76)
NEUTROPHILS NFR BLD AUTO: 8.08 10*3/MM3 (ref 1.7–7)
NEUTROPHILS NFR BLD AUTO: 8.67 10*3/MM3 (ref 1.7–7)
NEUTROPHILS NFR BLD AUTO: 8.99 10*3/MM3 (ref 1.7–7)
NEUTROPHILS NFR BLD AUTO: 81.3 % (ref 42.7–76)
NITRITE UR QL STRIP: NEGATIVE
NRBC BLD AUTO-RTO: 0 /100 WBC (ref 0–0.2)
NRBC BLD AUTO-RTO: 0.3 /100 WBC (ref 0–0.2)
NRBC BLD AUTO-RTO: 0.5 /100 WBC (ref 0–0.2)
NT-PROBNP SERPL-MCNC: 3216 PG/ML (ref 0–900)
NT-PROBNP SERPL-MCNC: 6534 PG/ML (ref 0–900)
O+P SPEC MICRO: NORMAL
O+P STL TRI STN: NORMAL
OXYHGB MFR BLDV: 81.6 %
OXYHGB MFR BLDV: 93.2 % (ref 94–99)
OXYHGB MFR BLDV: 93.9 % (ref 94–99)
OXYHGB MFR BLDV: 94.6 % (ref 94–99)
OXYHGB MFR BLDV: 94.6 % (ref 94–99)
OXYHGB MFR BLDV: 94.8 % (ref 94–99)
OXYHGB MFR BLDV: 96 % (ref 94–99)
OXYHGB MFR BLDV: 96 % (ref 94–99)
OXYHGB MFR BLDV: 96.6 % (ref 94–99)
OXYHGB MFR BLDV: 97.5 % (ref 94–99)
OXYHGB MFR BLDV: 97.9 % (ref 94–99)
PAW @ PEAK INSP FLOW SETTING VENT: 0 CMH2O
PCO2 BLDA: 31.9 MM HG (ref 35–45)
PCO2 BLDA: 32.5 MM HG (ref 35–45)
PCO2 BLDA: 32.6 MM HG (ref 35–45)
PCO2 BLDA: 36.2 MM HG (ref 35–45)
PCO2 BLDA: 43 MM HG (ref 35–45)
PCO2 BLDA: 45.7 MM HG (ref 35–45)
PCO2 BLDA: 46 MM HG (ref 35–45)
PCO2 BLDA: 46.3 MM HG (ref 35–45)
PCO2 BLDA: 47.7 MM HG (ref 35–45)
PCO2 BLDA: 48.7 MM HG (ref 35–45)
PCO2 BLDV: 36.2 MM HG (ref 41–51)
PCO2 TEMP ADJ BLD: 31.9 MM HG (ref 35–48)
PCO2 TEMP ADJ BLD: 32.5 MM HG (ref 35–48)
PCO2 TEMP ADJ BLD: 32.6 MM HG (ref 35–48)
PCO2 TEMP ADJ BLD: 36.2 MM HG (ref 35–48)
PCO2 TEMP ADJ BLD: 43 MM HG (ref 35–48)
PCO2 TEMP ADJ BLD: 45.7 MM HG (ref 35–48)
PCO2 TEMP ADJ BLD: 46 MM HG (ref 35–48)
PCO2 TEMP ADJ BLD: 46.3 MM HG (ref 35–48)
PCO2 TEMP ADJ BLD: 47.7 MM HG (ref 35–48)
PCO2 TEMP ADJ BLD: 48.7 MM HG (ref 35–48)
PEEP RESPIRATORY: 5 CM[H2O]
PEEP RESPIRATORY: 7 CM[H2O]
PH BLDA: 7.28 PH UNITS (ref 7.35–7.45)
PH BLDA: 7.29 PH UNITS (ref 7.35–7.45)
PH BLDA: 7.3 PH UNITS (ref 7.35–7.45)
PH BLDA: 7.33 PH UNITS (ref 7.35–7.45)
PH BLDA: 7.33 PH UNITS (ref 7.35–7.45)
PH BLDA: 7.36 PH UNITS (ref 7.35–7.45)
PH BLDA: 7.36 PH UNITS (ref 7.35–7.45)
PH BLDA: 7.44 PH UNITS (ref 7.35–7.45)
PH BLDA: 7.44 PH UNITS (ref 7.35–7.45)
PH BLDA: 7.48 PH UNITS (ref 7.35–7.45)
PH BLDV: 7.32 PH UNITS (ref 7.31–7.41)
PH UR STRIP.AUTO: 5.5 [PH] (ref 5–8)
PH, TEMP CORRECTED: 7.28 PH UNITS
PH, TEMP CORRECTED: 7.29 PH UNITS
PH, TEMP CORRECTED: 7.3 PH UNITS
PH, TEMP CORRECTED: 7.33 PH UNITS
PH, TEMP CORRECTED: 7.33 PH UNITS
PH, TEMP CORRECTED: 7.36 PH UNITS
PH, TEMP CORRECTED: 7.36 PH UNITS
PH, TEMP CORRECTED: 7.44 PH UNITS
PH, TEMP CORRECTED: 7.44 PH UNITS
PH, TEMP CORRECTED: 7.48 PH UNITS
PHOSPHATE SERPL-MCNC: 2.5 MG/DL (ref 2.5–4.5)
PHOSPHATE SERPL-MCNC: 3.4 MG/DL (ref 2.5–4.5)
PHOSPHATE SERPL-MCNC: 3.5 MG/DL (ref 2.5–4.5)
PHOSPHATE SERPL-MCNC: 3.7 MG/DL (ref 2.5–4.5)
PHOSPHATE SERPL-MCNC: 4 MG/DL (ref 2.5–4.5)
PHOSPHATE SERPL-MCNC: 4 MG/DL (ref 2.5–4.5)
PHOSPHATE SERPL-MCNC: 4.4 MG/DL (ref 2.5–4.5)
PHOSPHATE SERPL-MCNC: 5.4 MG/DL (ref 2.5–4.5)
PLATELET # BLD AUTO: 221 10*3/MM3 (ref 140–450)
PLATELET # BLD AUTO: 221 10*3/MM3 (ref 140–450)
PLATELET # BLD AUTO: 223 10*3/MM3 (ref 140–450)
PLATELET # BLD AUTO: 241 10*3/MM3 (ref 140–450)
PLATELET # BLD AUTO: 242 10*3/MM3 (ref 140–450)
PLATELET # BLD AUTO: 244 10*3/MM3 (ref 140–450)
PLATELET # BLD AUTO: 254 10*3/MM3 (ref 140–450)
PLATELET # BLD AUTO: 258 10*3/MM3 (ref 140–450)
PLATELET # BLD AUTO: 258 10*3/MM3 (ref 140–450)
PLATELET # BLD AUTO: 286 10*3/MM3 (ref 140–450)
PMV BLD AUTO: 10 FL (ref 6–12)
PMV BLD AUTO: 10.2 FL (ref 6–12)
PMV BLD AUTO: 10.3 FL (ref 6–12)
PMV BLD AUTO: 10.4 FL (ref 6–12)
PMV BLD AUTO: 10.6 FL (ref 6–12)
PMV BLD AUTO: 10.6 FL (ref 6–12)
PMV BLD AUTO: 10.7 FL (ref 6–12)
PMV BLD AUTO: 9.6 FL (ref 6–12)
PMV BLD AUTO: 9.7 FL (ref 6–12)
PMV BLD AUTO: 9.8 FL (ref 6–12)
PO2 BLDA: 115 MM HG (ref 83–108)
PO2 BLDA: 158 MM HG (ref 83–108)
PO2 BLDA: 67.5 MM HG (ref 83–108)
PO2 BLDA: 75.2 MM HG (ref 83–108)
PO2 BLDA: 76.9 MM HG (ref 83–108)
PO2 BLDA: 79.5 MM HG (ref 83–108)
PO2 BLDA: 81.7 MM HG (ref 83–108)
PO2 BLDA: 82.3 MM HG (ref 83–108)
PO2 BLDA: 90.3 MM HG (ref 83–108)
PO2 BLDA: 99.8 MM HG (ref 83–108)
PO2 BLDV: 54 MM HG (ref 27–53)
PO2 TEMP ADJ BLD: 115 MM HG (ref 83–108)
PO2 TEMP ADJ BLD: 158 MM HG (ref 83–108)
PO2 TEMP ADJ BLD: 67.5 MM HG (ref 83–108)
PO2 TEMP ADJ BLD: 75.2 MM HG (ref 83–108)
PO2 TEMP ADJ BLD: 76.9 MM HG (ref 83–108)
PO2 TEMP ADJ BLD: 79.5 MM HG (ref 83–108)
PO2 TEMP ADJ BLD: 81.7 MM HG (ref 83–108)
PO2 TEMP ADJ BLD: 82.3 MM HG (ref 83–108)
PO2 TEMP ADJ BLD: 90.3 MM HG (ref 83–108)
PO2 TEMP ADJ BLD: 99.8 MM HG (ref 83–108)
POTASSIUM SERPL-SCNC: 4.1 MMOL/L (ref 3.5–5.2)
POTASSIUM SERPL-SCNC: 4.3 MMOL/L (ref 3.5–5.2)
POTASSIUM SERPL-SCNC: 4.3 MMOL/L (ref 3.5–5.2)
POTASSIUM SERPL-SCNC: 4.8 MMOL/L (ref 3.5–5.2)
POTASSIUM SERPL-SCNC: 4.9 MMOL/L (ref 3.5–5.2)
POTASSIUM SERPL-SCNC: 4.9 MMOL/L (ref 3.5–5.2)
POTASSIUM SERPL-SCNC: 5 MMOL/L (ref 3.5–5.2)
POTASSIUM SERPL-SCNC: 5.1 MMOL/L (ref 3.5–5.2)
POTASSIUM SERPL-SCNC: 6 MMOL/L (ref 3.5–5.2)
POTASSIUM SERPL-SCNC: 6.4 MMOL/L (ref 3.5–5.2)
POTASSIUM SERPL-SCNC: 6.7 MMOL/L (ref 3.5–5.2)
PREALB SERPL-MCNC: 16 MG/DL (ref 20–40)
PROCALCITONIN SERPL-MCNC: 0.39 NG/ML (ref 0–0.25)
PROCALCITONIN SERPL-MCNC: 0.44 NG/ML (ref 0–0.25)
PROCALCITONIN SERPL-MCNC: 1.58 NG/ML (ref 0–0.25)
PROT SERPL-MCNC: 6.2 G/DL (ref 6–8.5)
PROT SERPL-MCNC: 6.8 G/DL (ref 6–8.5)
PROT SERPL-MCNC: 7.5 G/DL (ref 6–8.5)
PROT UR QL STRIP: NEGATIVE
QT INTERVAL: 382 MS
QT INTERVAL: 444 MS
QTC INTERVAL: 362 MS
QTC INTERVAL: 401 MS
RBC # BLD AUTO: 2.41 10*6/MM3 (ref 4.14–5.8)
RBC # BLD AUTO: 2.61 10*6/MM3 (ref 4.14–5.8)
RBC # BLD AUTO: 2.82 10*6/MM3 (ref 4.14–5.8)
RBC # BLD AUTO: 2.82 10*6/MM3 (ref 4.14–5.8)
RBC # BLD AUTO: 2.85 10*6/MM3 (ref 4.14–5.8)
RBC # BLD AUTO: 2.94 10*6/MM3 (ref 4.14–5.8)
RBC # BLD AUTO: 3.03 10*6/MM3 (ref 4.14–5.8)
RBC # BLD AUTO: 3.13 10*6/MM3 (ref 4.14–5.8)
RBC # BLD AUTO: 3.3 10*6/MM3 (ref 4.14–5.8)
RBC # BLD AUTO: 3.35 10*6/MM3 (ref 4.14–5.8)
RBC # UR STRIP: ABNORMAL /HPF
REF LAB TEST METHOD: ABNORMAL
RH BLD: POSITIVE
RHINOVIRUS RNA SPEC NAA+PROBE: NOT DETECTED
RSV RNA NPH QL NAA+NON-PROBE: NOT DETECTED
RSV RNA RESP QL NAA+PROBE: NOT DETECTED
S PNEUM AG SPEC QL LA: NEGATIVE
SARS-COV-2 RNA NPH QL NAA+NON-PROBE: NOT DETECTED
SARS-COV-2 RNA RESP QL NAA+PROBE: NOT DETECTED
SODIUM SERPL-SCNC: 120 MMOL/L (ref 136–145)
SODIUM SERPL-SCNC: 122 MMOL/L (ref 136–145)
SODIUM SERPL-SCNC: 126 MMOL/L (ref 136–145)
SODIUM SERPL-SCNC: 128 MMOL/L (ref 136–145)
SODIUM SERPL-SCNC: 129 MMOL/L (ref 136–145)
SODIUM SERPL-SCNC: 129 MMOL/L (ref 136–145)
SODIUM SERPL-SCNC: 133 MMOL/L (ref 136–145)
SODIUM SERPL-SCNC: 137 MMOL/L (ref 136–145)
SODIUM SERPL-SCNC: 138 MMOL/L (ref 136–145)
SODIUM SERPL-SCNC: 139 MMOL/L (ref 136–145)
SODIUM SERPL-SCNC: 140 MMOL/L (ref 136–145)
SP GR UR STRIP: 1.01 (ref 1–1.03)
SQUAMOUS #/AREA URNS HPF: ABNORMAL /HPF
T&S EXPIRATION DATE: NORMAL
TOTAL RATE: 0 BREATHS/MINUTE
TOTAL RATE: 10 BREATHS/MINUTE
TOTAL RATE: 12 BREATHS/MINUTE
TOTAL RATE: 15 BREATHS/MINUTE
TOTAL RATE: 20 BREATHS/MINUTE
TOTAL RATE: 20 BREATHS/MINUTE
TRIGL SERPL-MCNC: 84 MG/DL (ref 0–150)
TROPONIN T % DELTA: 2
TROPONIN T NUMERIC DELTA: 2 NG/L
TROPONIN T SERPL HS-MCNC: 98 NG/L
UNIT  ABO: NORMAL
UNIT  RH: NORMAL
UROBILINOGEN UR QL STRIP: ABNORMAL
VALPROATE SERPL-MCNC: 26.9 MCG/ML (ref 50–125)
VENTILATOR MODE: ABNORMAL
VT ON VENT VENT: 0.47 ML
VT ON VENT VENT: 0.52 ML
WBC # UR STRIP: ABNORMAL /HPF
WBC NRBC COR # BLD AUTO: 10.74 10*3/MM3 (ref 3.4–10.8)
WBC NRBC COR # BLD AUTO: 11.03 10*3/MM3 (ref 3.4–10.8)
WBC NRBC COR # BLD AUTO: 11.25 10*3/MM3 (ref 3.4–10.8)
WBC NRBC COR # BLD AUTO: 12.31 10*3/MM3 (ref 3.4–10.8)
WBC NRBC COR # BLD AUTO: 13.38 10*3/MM3 (ref 3.4–10.8)
WBC NRBC COR # BLD AUTO: 6.25 10*3/MM3 (ref 3.4–10.8)
WBC NRBC COR # BLD AUTO: 8.17 10*3/MM3 (ref 3.4–10.8)
WBC NRBC COR # BLD AUTO: 8.17 10*3/MM3 (ref 3.4–10.8)
WBC NRBC COR # BLD AUTO: 8.21 10*3/MM3 (ref 3.4–10.8)
WBC NRBC COR # BLD AUTO: 9.22 10*3/MM3 (ref 3.4–10.8)
WHOLE BLOOD HOLD COAG: NORMAL
WHOLE BLOOD HOLD SPECIMEN: NORMAL

## 2025-01-01 PROCEDURE — 25010000002 MIDAZOLAM 1 MG/ML 100ML NS 100 MG/100ML SOLUTION: Performed by: INTERNAL MEDICINE

## 2025-01-01 PROCEDURE — 25010000002 HEPARIN (PORCINE) PER 1000 UNITS: Performed by: INTERNAL MEDICINE

## 2025-01-01 PROCEDURE — 83050 HGB METHEMOGLOBIN QUAN: CPT

## 2025-01-01 PROCEDURE — 51702 INSERT TEMP BLADDER CATH: CPT

## 2025-01-01 PROCEDURE — 83735 ASSAY OF MAGNESIUM: CPT | Performed by: INTERNAL MEDICINE

## 2025-01-01 PROCEDURE — 25010000002 ALBUMIN HUMAN 25% PER 50 ML: Performed by: INTERNAL MEDICINE

## 2025-01-01 PROCEDURE — 86901 BLOOD TYPING SEROLOGIC RH(D): CPT | Performed by: EMERGENCY MEDICINE

## 2025-01-01 PROCEDURE — 82948 REAGENT STRIP/BLOOD GLUCOSE: CPT

## 2025-01-01 PROCEDURE — 94799 UNLISTED PULMONARY SVC/PX: CPT

## 2025-01-01 PROCEDURE — 25010000002 PHENOBARBITAL PER 120 MG: Performed by: PHYSICAL MEDICINE & REHABILITATION

## 2025-01-01 PROCEDURE — 84145 PROCALCITONIN (PCT): CPT | Performed by: NURSE PRACTITIONER

## 2025-01-01 PROCEDURE — 25010000002 FENTANYL 10 MCG/1 ML NS: Performed by: INTERNAL MEDICINE

## 2025-01-01 PROCEDURE — 99223 1ST HOSP IP/OBS HIGH 75: CPT | Performed by: NURSE PRACTITIONER

## 2025-01-01 PROCEDURE — 87070 CULTURE OTHR SPECIMN AEROBIC: CPT | Performed by: INTERNAL MEDICINE

## 2025-01-01 PROCEDURE — 99291 CRITICAL CARE FIRST HOUR: CPT | Performed by: INTERNAL MEDICINE

## 2025-01-01 PROCEDURE — 25010000002 CALCIUM GLUCONATE-NACL 1-0.675 GM/50ML-% SOLUTION: Performed by: EMERGENCY MEDICINE

## 2025-01-01 PROCEDURE — 94002 VENT MGMT INPAT INIT DAY: CPT

## 2025-01-01 PROCEDURE — 82375 ASSAY CARBOXYHB QUANT: CPT

## 2025-01-01 PROCEDURE — 82805 BLOOD GASES W/O2 SATURATION: CPT

## 2025-01-01 PROCEDURE — 31645 BRNCHSC W/THER ASPIR 1ST: CPT | Performed by: INTERNAL MEDICINE

## 2025-01-01 PROCEDURE — 71045 X-RAY EXAM CHEST 1 VIEW: CPT

## 2025-01-01 PROCEDURE — 0DP68UZ REMOVAL OF FEEDING DEVICE FROM STOMACH, VIA NATURAL OR ARTIFICIAL OPENING ENDOSCOPIC: ICD-10-PCS | Performed by: INTERNAL MEDICINE

## 2025-01-01 PROCEDURE — 71250 CT THORAX DX C-: CPT

## 2025-01-01 PROCEDURE — 85014 HEMATOCRIT: CPT | Performed by: NURSE PRACTITIONER

## 2025-01-01 PROCEDURE — 87040 BLOOD CULTURE FOR BACTERIA: CPT | Performed by: EMERGENCY MEDICINE

## 2025-01-01 PROCEDURE — 25010000002 PIPERACILLIN SOD-TAZOBACTAM PER 1 G: Performed by: EMERGENCY MEDICINE

## 2025-01-01 PROCEDURE — 80048 BASIC METABOLIC PNL TOTAL CA: CPT | Performed by: INTERNAL MEDICINE

## 2025-01-01 PROCEDURE — 94003 VENT MGMT INPAT SUBQ DAY: CPT

## 2025-01-01 PROCEDURE — 25010000002 MEROPENEM PER 100 MG: Performed by: INTERNAL MEDICINE

## 2025-01-01 PROCEDURE — 86900 BLOOD TYPING SEROLOGIC ABO: CPT

## 2025-01-01 PROCEDURE — 85025 COMPLETE CBC W/AUTO DIFF WBC: CPT | Performed by: INTERNAL MEDICINE

## 2025-01-01 PROCEDURE — 86900 BLOOD TYPING SEROLOGIC ABO: CPT | Performed by: EMERGENCY MEDICINE

## 2025-01-01 PROCEDURE — 84100 ASSAY OF PHOSPHORUS: CPT | Performed by: INTERNAL MEDICINE

## 2025-01-01 PROCEDURE — 93005 ELECTROCARDIOGRAM TRACING: CPT

## 2025-01-01 PROCEDURE — 43247 EGD REMOVE FOREIGN BODY: CPT | Performed by: INTERNAL MEDICINE

## 2025-01-01 PROCEDURE — 87177 OVA AND PARASITES SMEARS: CPT | Performed by: INTERNAL MEDICINE

## 2025-01-01 PROCEDURE — 85027 COMPLETE CBC AUTOMATED: CPT | Performed by: NURSE PRACTITIONER

## 2025-01-01 PROCEDURE — 25010000002 CALCIUM GLUCONATE-NACL 1-0.675 GM/50ML-% SOLUTION

## 2025-01-01 PROCEDURE — 87205 SMEAR GRAM STAIN: CPT | Performed by: NURSE PRACTITIONER

## 2025-01-01 PROCEDURE — 63710000001 INSULIN REGULAR HUMAN PER 5 UNITS

## 2025-01-01 PROCEDURE — 85018 HEMOGLOBIN: CPT | Performed by: NURSE PRACTITIONER

## 2025-01-01 PROCEDURE — 87449 NOS EACH ORGANISM AG IA: CPT | Performed by: NURSE PRACTITIONER

## 2025-01-01 PROCEDURE — 94690 O2 UPTK REST INDIRECT: CPT

## 2025-01-01 PROCEDURE — 36600 WITHDRAWAL OF ARTERIAL BLOOD: CPT

## 2025-01-01 PROCEDURE — 5A09357 ASSISTANCE WITH RESPIRATORY VENTILATION, LESS THAN 24 CONSECUTIVE HOURS, CONTINUOUS POSITIVE AIRWAY PRESSURE: ICD-10-PCS | Performed by: STUDENT IN AN ORGANIZED HEALTH CARE EDUCATION/TRAINING PROGRAM

## 2025-01-01 PROCEDURE — 25010000002 FUROSEMIDE PER 20 MG: Performed by: EMERGENCY MEDICINE

## 2025-01-01 PROCEDURE — 25010000002 CHLOROTHIAZIDE PER 500 MG: Performed by: INTERNAL MEDICINE

## 2025-01-01 PROCEDURE — 84134 ASSAY OF PREALBUMIN: CPT | Performed by: INTERNAL MEDICINE

## 2025-01-01 PROCEDURE — 85018 HEMOGLOBIN: CPT | Performed by: INTERNAL MEDICINE

## 2025-01-01 PROCEDURE — 87206 SMEAR FLUORESCENT/ACID STAI: CPT | Performed by: INTERNAL MEDICINE

## 2025-01-01 PROCEDURE — 83036 HEMOGLOBIN GLYCOSYLATED A1C: CPT | Performed by: NURSE PRACTITIONER

## 2025-01-01 PROCEDURE — 0202U NFCT DS 22 TRGT SARS-COV-2: CPT | Performed by: INTERNAL MEDICINE

## 2025-01-01 PROCEDURE — 94761 N-INVAS EAR/PLS OXIMETRY MLT: CPT

## 2025-01-01 PROCEDURE — 25010000002 FENTANYL 10 MCG/1 ML NS: Performed by: STUDENT IN AN ORGANIZED HEALTH CARE EDUCATION/TRAINING PROGRAM

## 2025-01-01 PROCEDURE — 99291 CRITICAL CARE FIRST HOUR: CPT | Performed by: STUDENT IN AN ORGANIZED HEALTH CARE EDUCATION/TRAINING PROGRAM

## 2025-01-01 PROCEDURE — 84100 ASSAY OF PHOSPHORUS: CPT | Performed by: NURSE PRACTITIONER

## 2025-01-01 PROCEDURE — 25010000002 PIPERACILLIN SOD-TAZOBACTAM PER 1 G: Performed by: INTERNAL MEDICINE

## 2025-01-01 PROCEDURE — 87070 CULTURE OTHR SPECIMN AEROBIC: CPT | Performed by: NURSE PRACTITIONER

## 2025-01-01 PROCEDURE — 70450 CT HEAD/BRAIN W/O DYE: CPT

## 2025-01-01 PROCEDURE — 0DH68UZ INSERTION OF FEEDING DEVICE INTO STOMACH, VIA NATURAL OR ARTIFICIAL OPENING ENDOSCOPIC: ICD-10-PCS | Performed by: INTERNAL MEDICINE

## 2025-01-01 PROCEDURE — P9040 RBC LEUKOREDUCED IRRADIATED: HCPCS

## 2025-01-01 PROCEDURE — 31500 INSERT EMERGENCY AIRWAY: CPT | Performed by: NURSE PRACTITIONER

## 2025-01-01 PROCEDURE — 99232 SBSQ HOSP IP/OBS MODERATE 35: CPT | Performed by: INTERNAL MEDICINE

## 2025-01-01 PROCEDURE — 93005 ELECTROCARDIOGRAM TRACING: CPT | Performed by: EMERGENCY MEDICINE

## 2025-01-01 PROCEDURE — 80164 ASSAY DIPROPYLACETIC ACD TOT: CPT | Performed by: EMERGENCY MEDICINE

## 2025-01-01 PROCEDURE — 80048 BASIC METABOLIC PNL TOTAL CA: CPT | Performed by: NURSE PRACTITIONER

## 2025-01-01 PROCEDURE — P9047 ALBUMIN (HUMAN), 25%, 50ML: HCPCS | Performed by: INTERNAL MEDICINE

## 2025-01-01 PROCEDURE — 85025 COMPLETE CBC W/AUTO DIFF WBC: CPT | Performed by: EMERGENCY MEDICINE

## 2025-01-01 PROCEDURE — 83735 ASSAY OF MAGNESIUM: CPT | Performed by: NURSE PRACTITIONER

## 2025-01-01 PROCEDURE — 25010000002 BUMETANIDE PER 0.5 MG: Performed by: INTERNAL MEDICINE

## 2025-01-01 PROCEDURE — 84478 ASSAY OF TRIGLYCERIDES: CPT | Performed by: INTERNAL MEDICINE

## 2025-01-01 PROCEDURE — 36430 TRANSFUSION BLD/BLD COMPNT: CPT

## 2025-01-01 PROCEDURE — 0BJ08ZZ INSPECTION OF TRACHEOBRONCHIAL TREE, VIA NATURAL OR ARTIFICIAL OPENING ENDOSCOPIC: ICD-10-PCS | Performed by: INTERNAL MEDICINE

## 2025-01-01 PROCEDURE — 25010000002 PROPOFOL 10 MG/ML EMULSION

## 2025-01-01 PROCEDURE — 85014 HEMATOCRIT: CPT | Performed by: INTERNAL MEDICINE

## 2025-01-01 PROCEDURE — 93010 ELECTROCARDIOGRAM REPORT: CPT | Performed by: INTERNAL MEDICINE

## 2025-01-01 PROCEDURE — 80069 RENAL FUNCTION PANEL: CPT | Performed by: INTERNAL MEDICINE

## 2025-01-01 PROCEDURE — 87116 MYCOBACTERIA CULTURE: CPT | Performed by: INTERNAL MEDICINE

## 2025-01-01 PROCEDURE — 25010000002 LORAZEPAM PER 2 MG: Performed by: INTERNAL MEDICINE

## 2025-01-01 PROCEDURE — 87637 SARSCOV2&INF A&B&RSV AMP PRB: CPT | Performed by: EMERGENCY MEDICINE

## 2025-01-01 PROCEDURE — 84484 ASSAY OF TROPONIN QUANT: CPT | Performed by: EMERGENCY MEDICINE

## 2025-01-01 PROCEDURE — 63710000001 INSULIN REGULAR HUMAN PER 5 UNITS: Performed by: EMERGENCY MEDICINE

## 2025-01-01 PROCEDURE — 94660 CPAP INITIATION&MGMT: CPT

## 2025-01-01 PROCEDURE — P9016 RBC LEUKOCYTES REDUCED: HCPCS

## 2025-01-01 PROCEDURE — 87077 CULTURE AEROBIC IDENTIFY: CPT | Performed by: EMERGENCY MEDICINE

## 2025-01-01 PROCEDURE — 83880 ASSAY OF NATRIURETIC PEPTIDE: CPT | Performed by: EMERGENCY MEDICINE

## 2025-01-01 PROCEDURE — 86923 COMPATIBILITY TEST ELECTRIC: CPT

## 2025-01-01 PROCEDURE — 86140 C-REACTIVE PROTEIN: CPT | Performed by: INTERNAL MEDICINE

## 2025-01-01 PROCEDURE — 36415 COLL VENOUS BLD VENIPUNCTURE: CPT

## 2025-01-01 PROCEDURE — 86850 RBC ANTIBODY SCREEN: CPT | Performed by: EMERGENCY MEDICINE

## 2025-01-01 PROCEDURE — 0BH17EZ INSERTION OF ENDOTRACHEAL AIRWAY INTO TRACHEA, VIA NATURAL OR ARTIFICIAL OPENING: ICD-10-PCS | Performed by: NURSE PRACTITIONER

## 2025-01-01 PROCEDURE — 25010000002 PIPERACILLIN SOD-TAZOBACTAM PER 1 G: Performed by: NURSE PRACTITIONER

## 2025-01-01 PROCEDURE — 25010000002 FUROSEMIDE PER 20 MG: Performed by: NURSE PRACTITIONER

## 2025-01-01 PROCEDURE — 80053 COMPREHEN METABOLIC PANEL: CPT | Performed by: INTERNAL MEDICINE

## 2025-01-01 PROCEDURE — 87209 SMEAR COMPLEX STAIN: CPT | Performed by: INTERNAL MEDICINE

## 2025-01-01 PROCEDURE — 87086 URINE CULTURE/COLONY COUNT: CPT | Performed by: EMERGENCY MEDICINE

## 2025-01-01 PROCEDURE — 25010000002 PROPOFOL 10 MG/ML EMULSION: Performed by: INTERNAL MEDICINE

## 2025-01-01 PROCEDURE — 25010000002 PROPOFOL 10 MG/ML EMULSION: Performed by: STUDENT IN AN ORGANIZED HEALTH CARE EDUCATION/TRAINING PROGRAM

## 2025-01-01 PROCEDURE — 87899 AGENT NOS ASSAY W/OPTIC: CPT | Performed by: NURSE PRACTITIONER

## 2025-01-01 PROCEDURE — 25010000002 VANCOMYCIN 1.5-0.9 GM/500ML-% SOLUTION: Performed by: NURSE PRACTITIONER

## 2025-01-01 PROCEDURE — 5A1955Z RESPIRATORY VENTILATION, GREATER THAN 96 CONSECUTIVE HOURS: ICD-10-PCS | Performed by: INTERNAL MEDICINE

## 2025-01-01 PROCEDURE — 87186 SC STD MICRODIL/AGAR DIL: CPT | Performed by: EMERGENCY MEDICINE

## 2025-01-01 PROCEDURE — 3E0G76Z INTRODUCTION OF NUTRITIONAL SUBSTANCE INTO UPPER GI, VIA NATURAL OR ARTIFICIAL OPENING: ICD-10-PCS | Performed by: INTERNAL MEDICINE

## 2025-01-01 PROCEDURE — 80053 COMPREHEN METABOLIC PANEL: CPT | Performed by: EMERGENCY MEDICINE

## 2025-01-01 PROCEDURE — 81001 URINALYSIS AUTO W/SCOPE: CPT | Performed by: EMERGENCY MEDICINE

## 2025-01-01 PROCEDURE — 94690 O2 UPTK REST INDIRECT: CPT | Performed by: INTERNAL MEDICINE

## 2025-01-01 PROCEDURE — 63710000001 INSULIN REGULAR HUMAN PER 5 UNITS: Performed by: INTERNAL MEDICINE

## 2025-01-01 PROCEDURE — 99291 CRITICAL CARE FIRST HOUR: CPT

## 2025-01-01 PROCEDURE — 83880 ASSAY OF NATRIURETIC PEPTIDE: CPT | Performed by: INTERNAL MEDICINE

## 2025-01-01 PROCEDURE — 83605 ASSAY OF LACTIC ACID: CPT | Performed by: EMERGENCY MEDICINE

## 2025-01-01 PROCEDURE — 25010000002 FENTANYL CITRATE (PF) 50 MCG/ML SOLUTION

## 2025-01-01 PROCEDURE — 87102 FUNGUS ISOLATION CULTURE: CPT | Performed by: INTERNAL MEDICINE

## 2025-01-01 PROCEDURE — 99232 SBSQ HOSP IP/OBS MODERATE 35: CPT | Performed by: PHYSICIAN ASSISTANT

## 2025-01-01 PROCEDURE — 84145 PROCALCITONIN (PCT): CPT | Performed by: INTERNAL MEDICINE

## 2025-01-01 PROCEDURE — 87641 MR-STAPH DNA AMP PROBE: CPT | Performed by: NURSE PRACTITIONER

## 2025-01-01 PROCEDURE — 87205 SMEAR GRAM STAIN: CPT | Performed by: INTERNAL MEDICINE

## 2025-01-01 PROCEDURE — 94640 AIRWAY INHALATION TREATMENT: CPT

## 2025-01-01 RX ORDER — HEPARIN SODIUM 5000 [USP'U]/ML
5000 INJECTION, SOLUTION INTRAVENOUS; SUBCUTANEOUS EVERY 8 HOURS SCHEDULED
Status: DISCONTINUED | OUTPATIENT
Start: 2025-01-01 | End: 2025-01-01

## 2025-01-01 RX ORDER — HYDRALAZINE HYDROCHLORIDE 50 MG/1
100 TABLET, FILM COATED ORAL 3 TIMES DAILY
Status: DISCONTINUED | OUTPATIENT
Start: 2025-01-01 | End: 2025-01-01

## 2025-01-01 RX ORDER — METOLAZONE 5 MG/1
10 TABLET ORAL ONCE
Status: COMPLETED | OUTPATIENT
Start: 2025-01-01 | End: 2025-01-01

## 2025-01-01 RX ORDER — CALCIUM GLUCONATE 20 MG/ML
1000 INJECTION, SOLUTION INTRAVENOUS ONCE
Status: COMPLETED | OUTPATIENT
Start: 2025-01-01 | End: 2025-01-01

## 2025-01-01 RX ORDER — ETOMIDATE 2 MG/ML
20 INJECTION INTRAVENOUS ONCE
Status: COMPLETED | OUTPATIENT
Start: 2025-01-01 | End: 2025-01-01

## 2025-01-01 RX ORDER — FLUOXETINE 20 MG/5ML
20 SOLUTION ORAL DAILY
Status: DISCONTINUED | OUTPATIENT
Start: 2025-01-01 | End: 2025-01-01

## 2025-01-01 RX ORDER — CLONIDINE HYDROCHLORIDE 0.2 MG/1
0.2 TABLET ORAL EVERY 8 HOURS SCHEDULED
Status: DISCONTINUED | OUTPATIENT
Start: 2025-01-01 | End: 2025-01-01 | Stop reason: HOSPADM

## 2025-01-01 RX ORDER — BISACODYL 10 MG
10 SUPPOSITORY, RECTAL RECTAL DAILY PRN
Status: DISCONTINUED | OUTPATIENT
Start: 2025-01-01 | End: 2025-01-01

## 2025-01-01 RX ORDER — ETOMIDATE 2 MG/ML
40 INJECTION INTRAVENOUS ONCE
Status: DISCONTINUED | OUTPATIENT
Start: 2025-01-01 | End: 2025-01-01

## 2025-01-01 RX ORDER — AMLODIPINE BESYLATE 10 MG/1
10 TABLET ORAL
Status: DISCONTINUED | OUTPATIENT
Start: 2025-01-01 | End: 2025-01-01

## 2025-01-01 RX ORDER — TERAZOSIN 2 MG/1
2 CAPSULE ORAL NIGHTLY
Status: DISCONTINUED | OUTPATIENT
Start: 2025-01-01 | End: 2025-01-01

## 2025-01-01 RX ORDER — ALBUTEROL SULFATE 0.83 MG/ML
2.5 SOLUTION RESPIRATORY (INHALATION) EVERY 4 HOURS PRN
Status: DISCONTINUED | OUTPATIENT
Start: 2025-01-01 | End: 2025-01-01 | Stop reason: HOSPADM

## 2025-01-01 RX ORDER — BISACODYL 5 MG/1
5 TABLET, DELAYED RELEASE ORAL DAILY PRN
Status: DISCONTINUED | OUTPATIENT
Start: 2025-01-01 | End: 2025-01-01

## 2025-01-01 RX ORDER — DEXTROSE MONOHYDRATE 25 G/50ML
25 INJECTION, SOLUTION INTRAVENOUS ONCE
Status: COMPLETED | OUTPATIENT
Start: 2025-01-01 | End: 2025-01-01

## 2025-01-01 RX ORDER — ASCORBIC ACID 500 MG
500 TABLET ORAL DAILY
COMMUNITY

## 2025-01-01 RX ORDER — FENTANYL CITRATE 50 UG/ML
100 INJECTION, SOLUTION INTRAMUSCULAR; INTRAVENOUS ONCE
Refills: 0 | Status: COMPLETED | OUTPATIENT
Start: 2025-01-01 | End: 2025-01-01

## 2025-01-01 RX ORDER — IBUPROFEN 600 MG/1
1 TABLET ORAL
Status: DISCONTINUED | OUTPATIENT
Start: 2025-01-01 | End: 2025-01-01

## 2025-01-01 RX ORDER — VALPROIC ACID 250 MG/5ML
150 SOLUTION ORAL EVERY 12 HOURS SCHEDULED
Status: DISCONTINUED | OUTPATIENT
Start: 2025-01-01 | End: 2025-01-01 | Stop reason: HOSPADM

## 2025-01-01 RX ORDER — VANCOMYCIN/0.9 % SOD CHLORIDE 1.5G/250ML
20 PLASTIC BAG, INJECTION (ML) INTRAVENOUS ONCE
Status: COMPLETED | OUTPATIENT
Start: 2025-01-01 | End: 2025-01-01

## 2025-01-01 RX ORDER — DEXTROSE MONOHYDRATE 100 MG/ML
50 INJECTION, SOLUTION INTRAVENOUS CONTINUOUS
Status: DISCONTINUED | OUTPATIENT
Start: 2025-01-01 | End: 2025-01-01

## 2025-01-01 RX ORDER — INDOMETHACIN 25 MG/1
50 CAPSULE ORAL ONCE
Status: COMPLETED | OUTPATIENT
Start: 2025-01-01 | End: 2025-01-01

## 2025-01-01 RX ORDER — ALBUTEROL SULFATE 5 MG/ML
10 SOLUTION RESPIRATORY (INHALATION) ONCE
Status: COMPLETED | OUTPATIENT
Start: 2025-01-01 | End: 2025-01-01

## 2025-01-01 RX ORDER — MIDAZOLAM IN NACL,ISO-OSMOT/PF 50 MG/50ML
1-10 INFUSION BOTTLE (ML) INTRAVENOUS
Status: DISCONTINUED | OUTPATIENT
Start: 2025-01-01 | End: 2025-01-01 | Stop reason: HOSPADM

## 2025-01-01 RX ORDER — NICOTINE POLACRILEX 4 MG
15 LOZENGE BUCCAL
Status: DISCONTINUED | OUTPATIENT
Start: 2025-01-01 | End: 2025-01-01

## 2025-01-01 RX ORDER — NITROGLYCERIN 0.4 MG/1
0.4 TABLET SUBLINGUAL
Status: DISCONTINUED | OUTPATIENT
Start: 2025-01-01 | End: 2025-01-01

## 2025-01-01 RX ORDER — DOCUSATE SODIUM 50 MG/5 ML
100 LIQUID (ML) ORAL 2 TIMES DAILY
Status: DISCONTINUED | OUTPATIENT
Start: 2025-01-01 | End: 2025-01-01

## 2025-01-01 RX ORDER — PANTOPRAZOLE SODIUM 40 MG/10ML
40 INJECTION, POWDER, LYOPHILIZED, FOR SOLUTION INTRAVENOUS ONCE
Status: COMPLETED | OUTPATIENT
Start: 2025-01-01 | End: 2025-01-01

## 2025-01-01 RX ORDER — VALPROIC ACID 250 MG/5ML
150 SOLUTION ORAL EVERY 12 HOURS SCHEDULED
Status: DISCONTINUED | OUTPATIENT
Start: 2025-01-01 | End: 2025-01-01

## 2025-01-01 RX ORDER — POLYETHYLENE GLYCOL 3350 17 G/17G
17 POWDER, FOR SOLUTION ORAL DAILY PRN
Status: DISCONTINUED | OUTPATIENT
Start: 2025-01-01 | End: 2025-01-01

## 2025-01-01 RX ORDER — SODIUM CHLORIDE 0.9 % (FLUSH) 0.9 %
10 SYRINGE (ML) INJECTION AS NEEDED
Status: DISCONTINUED | OUTPATIENT
Start: 2025-01-01 | End: 2025-01-01 | Stop reason: HOSPADM

## 2025-01-01 RX ORDER — FUROSEMIDE 10 MG/ML
40 INJECTION INTRAMUSCULAR; INTRAVENOUS ONCE
Status: COMPLETED | OUTPATIENT
Start: 2025-01-01 | End: 2025-01-01

## 2025-01-01 RX ORDER — SODIUM CHLORIDE 0.9 % (FLUSH) 0.9 %
10 SYRINGE (ML) INJECTION EVERY 12 HOURS SCHEDULED
Status: DISCONTINUED | OUTPATIENT
Start: 2025-01-01 | End: 2025-01-01

## 2025-01-01 RX ORDER — DONEPEZIL HYDROCHLORIDE 10 MG/1
10 TABLET, FILM COATED ORAL NIGHTLY
Status: DISCONTINUED | OUTPATIENT
Start: 2025-01-01 | End: 2025-01-01

## 2025-01-01 RX ORDER — AMOXICILLIN 250 MG
2 CAPSULE ORAL 2 TIMES DAILY
Status: DISCONTINUED | OUTPATIENT
Start: 2025-01-01 | End: 2025-01-01

## 2025-01-01 RX ORDER — PANTOPRAZOLE SODIUM 40 MG/10ML
40 INJECTION, POWDER, LYOPHILIZED, FOR SOLUTION INTRAVENOUS EVERY 12 HOURS SCHEDULED
Status: DISCONTINUED | OUTPATIENT
Start: 2025-01-01 | End: 2025-01-01 | Stop reason: HOSPADM

## 2025-01-01 RX ORDER — CASTOR OIL AND BALSAM, PERU 788; 87 MG/G; MG/G
1 OINTMENT TOPICAL EVERY 12 HOURS SCHEDULED
Status: DISCONTINUED | OUTPATIENT
Start: 2025-01-01 | End: 2025-01-01 | Stop reason: HOSPADM

## 2025-01-01 RX ORDER — SODIUM CHLORIDE 9 MG/ML
40 INJECTION, SOLUTION INTRAVENOUS AS NEEDED
Status: DISCONTINUED | OUTPATIENT
Start: 2025-01-01 | End: 2025-01-01

## 2025-01-01 RX ORDER — VANCOMYCIN/0.9 % SOD CHLORIDE 1.5G/250ML
1500 PLASTIC BAG, INJECTION (ML) INTRAVENOUS EVERY 24 HOURS
Status: DISCONTINUED | OUTPATIENT
Start: 2025-01-01 | End: 2025-01-01

## 2025-01-01 RX ORDER — FLUOXETINE 20 MG/5ML
20 SOLUTION ORAL DAILY
Status: DISCONTINUED | OUTPATIENT
Start: 2025-01-01 | End: 2025-01-01 | Stop reason: SDUPTHER

## 2025-01-01 RX ORDER — FENTANYL CITRATE 50 UG/ML
INJECTION, SOLUTION INTRAMUSCULAR; INTRAVENOUS
Status: COMPLETED
Start: 2025-01-01 | End: 2025-01-01

## 2025-01-01 RX ORDER — LORAZEPAM 2 MG/ML
2 INJECTION INTRAMUSCULAR EVERY 6 HOURS PRN
Status: DISCONTINUED | OUTPATIENT
Start: 2025-01-01 | End: 2025-01-01

## 2025-01-01 RX ORDER — PROPOFOL 10 MG/ML
VIAL (ML) INTRAVENOUS
Status: COMPLETED
Start: 2025-01-01 | End: 2025-01-01

## 2025-01-01 RX ORDER — ROCURONIUM BROMIDE 50 MG/5 ML
50 SYRINGE (ML) INTRAVENOUS ONCE
Status: COMPLETED | OUTPATIENT
Start: 2025-01-01 | End: 2025-01-01

## 2025-01-01 RX ORDER — ALBUMIN (HUMAN) 12.5 G/50ML
25 SOLUTION INTRAVENOUS
Status: DISPENSED | OUTPATIENT
Start: 2025-01-01 | End: 2025-01-01

## 2025-01-01 RX ORDER — CLONIDINE HYDROCHLORIDE 0.2 MG/1
0.2 TABLET ORAL EVERY 8 HOURS SCHEDULED
Status: DISCONTINUED | OUTPATIENT
Start: 2025-01-01 | End: 2025-01-01

## 2025-01-01 RX ORDER — BUMETANIDE 0.25 MG/ML
2 INJECTION, SOLUTION INTRAMUSCULAR; INTRAVENOUS EVERY 4 HOURS
Status: COMPLETED | OUTPATIENT
Start: 2025-01-01 | End: 2025-01-01

## 2025-01-01 RX ORDER — SODIUM CHLORIDE 0.9 % (FLUSH) 0.9 %
10 SYRINGE (ML) INJECTION AS NEEDED
Status: DISCONTINUED | OUTPATIENT
Start: 2025-01-01 | End: 2025-01-01

## 2025-01-01 RX ORDER — DEXTROSE MONOHYDRATE 25 G/50ML
25 INJECTION, SOLUTION INTRAVENOUS
Status: DISCONTINUED | OUTPATIENT
Start: 2025-01-01 | End: 2025-01-01

## 2025-01-01 RX ADMIN — MEROPENEM 1000 MG: 1 INJECTION INTRAVENOUS at 21:02

## 2025-01-01 RX ADMIN — CASTOR OIL AND BALSAM, PERU 1 APPLICATION: 788; 87 OINTMENT TOPICAL at 09:10

## 2025-01-01 RX ADMIN — TERAZOSIN HYDROCHLORIDE ANHYDROUS 2 MG: 2 CAPSULE ORAL at 20:52

## 2025-01-01 RX ADMIN — PANTOPRAZOLE SODIUM 40 MG: 40 INJECTION, POWDER, FOR SOLUTION INTRAVENOUS at 21:26

## 2025-01-01 RX ADMIN — CALCIUM GLUCONATE 1000 MG: 20 INJECTION, SOLUTION INTRAVENOUS at 06:05

## 2025-01-01 RX ADMIN — Medication 200 MCG/HR: at 03:02

## 2025-01-01 RX ADMIN — DEXTROSE MONOHYDRATE 25 G: 25 INJECTION, SOLUTION INTRAVENOUS at 20:45

## 2025-01-01 RX ADMIN — CASTOR OIL AND BALSAM, PERU 1 APPLICATION: 788; 87 OINTMENT TOPICAL at 13:23

## 2025-01-01 RX ADMIN — PANTOPRAZOLE SODIUM 40 MG: 40 INJECTION, POWDER, FOR SOLUTION INTRAVENOUS at 18:59

## 2025-01-01 RX ADMIN — VALPROIC ACID 150 MG: 250 SOLUTION ORAL at 21:59

## 2025-01-01 RX ADMIN — PANTOPRAZOLE SODIUM 40 MG: 40 INJECTION, POWDER, FOR SOLUTION INTRAVENOUS at 08:09

## 2025-01-01 RX ADMIN — VALPROIC ACID 150 MG: 250 SOLUTION ORAL at 09:21

## 2025-01-01 RX ADMIN — DEXTROSE MONOHYDRATE 25 G: 25 INJECTION, SOLUTION INTRAVENOUS at 11:37

## 2025-01-01 RX ADMIN — CASTOR OIL AND BALSAM, PERU 1 APPLICATION: 788; 87 OINTMENT TOPICAL at 08:08

## 2025-01-01 RX ADMIN — CLONIDINE HYDROCHLORIDE 0.2 MG: 0.2 TABLET ORAL at 21:03

## 2025-01-01 RX ADMIN — PROPOFOL 30 MCG/KG/MIN: 10 INJECTION, EMULSION INTRAVENOUS at 00:18

## 2025-01-01 RX ADMIN — ALBUMIN (HUMAN) 25 G: 0.25 INJECTION, SOLUTION INTRAVENOUS at 12:51

## 2025-01-01 RX ADMIN — SODIUM BICARBONATE 50 MEQ: 84 INJECTION INTRAVENOUS at 06:27

## 2025-01-01 RX ADMIN — PIPERACILLIN AND TAZOBACTAM 3.38 G: 3; .375 INJECTION, POWDER, LYOPHILIZED, FOR SOLUTION INTRAVENOUS at 20:26

## 2025-01-01 RX ADMIN — CASTOR OIL AND BALSAM, PERU 1 APPLICATION: 788; 87 OINTMENT TOPICAL at 08:16

## 2025-01-01 RX ADMIN — CHLOROTHIAZIDE SODIUM 500 MG: 500 INJECTION, POWDER, LYOPHILIZED, FOR SOLUTION INTRAVENOUS at 13:23

## 2025-01-01 RX ADMIN — MEROPENEM 1000 MG: 1 INJECTION INTRAVENOUS at 13:32

## 2025-01-01 RX ADMIN — PANTOPRAZOLE SODIUM 40 MG: 40 INJECTION, POWDER, FOR SOLUTION INTRAVENOUS at 08:08

## 2025-01-01 RX ADMIN — MIDAZOLAM HYDROCHLORIDE 7 MG/HR: 1 INJECTION, SOLUTION INTRAVENOUS at 01:22

## 2025-01-01 RX ADMIN — SODIUM ZIRCONIUM CYCLOSILICATE 10 G: 10 POWDER, FOR SUSPENSION ORAL at 16:30

## 2025-01-01 RX ADMIN — VALPROIC ACID 150 MG: 250 SOLUTION ORAL at 08:06

## 2025-01-01 RX ADMIN — Medication 10 ML: at 22:16

## 2025-01-01 RX ADMIN — HYDRALAZINE HYDROCHLORIDE 100 MG: 50 TABLET ORAL at 08:01

## 2025-01-01 RX ADMIN — Medication 200 MCG/HR: at 14:32

## 2025-01-01 RX ADMIN — PANTOPRAZOLE SODIUM 40 MG: 40 INJECTION, POWDER, FOR SOLUTION INTRAVENOUS at 00:23

## 2025-01-01 RX ADMIN — TERAZOSIN HYDROCHLORIDE ANHYDROUS 2 MG: 2 CAPSULE ORAL at 20:45

## 2025-01-01 RX ADMIN — TERAZOSIN HYDROCHLORIDE ANHYDROUS 2 MG: 2 CAPSULE ORAL at 21:58

## 2025-01-01 RX ADMIN — DEXTROSE MONOHYDRATE 25 G: 25 INJECTION, SOLUTION INTRAVENOUS at 17:40

## 2025-01-01 RX ADMIN — PIPERACILLIN AND TAZOBACTAM 3.38 G: 3; .375 INJECTION, POWDER, LYOPHILIZED, FOR SOLUTION INTRAVENOUS at 02:37

## 2025-01-01 RX ADMIN — AMLODIPINE BESYLATE 10 MG: 10 TABLET ORAL at 21:59

## 2025-01-01 RX ADMIN — DOCUSATE SODIUM 100 MG: 50 LIQUID ORAL at 20:28

## 2025-01-01 RX ADMIN — MEROPENEM 1000 MG: 1 INJECTION INTRAVENOUS at 05:06

## 2025-01-01 RX ADMIN — MUPIROCIN 1 APPLICATION: 20 OINTMENT TOPICAL at 14:39

## 2025-01-01 RX ADMIN — CASTOR OIL AND BALSAM, PERU 1 APPLICATION: 788; 87 OINTMENT TOPICAL at 07:44

## 2025-01-01 RX ADMIN — Medication 200 MCG/HR: at 00:13

## 2025-01-01 RX ADMIN — ALBUTEROL SULFATE 2.5 MG: 2.5 SOLUTION RESPIRATORY (INHALATION) at 12:05

## 2025-01-01 RX ADMIN — MIDAZOLAM HYDROCHLORIDE 5 MG/HR: 1 INJECTION, SOLUTION INTRAVENOUS at 16:59

## 2025-01-01 RX ADMIN — MUPIROCIN 1 APPLICATION: 20 OINTMENT TOPICAL at 20:40

## 2025-01-01 RX ADMIN — Medication 10 ML: at 09:21

## 2025-01-01 RX ADMIN — Medication 1500 MG: at 21:40

## 2025-01-01 RX ADMIN — MUPIROCIN 1 APPLICATION: 20 OINTMENT TOPICAL at 08:01

## 2025-01-01 RX ADMIN — HYDRALAZINE HYDROCHLORIDE 100 MG: 50 TABLET ORAL at 21:58

## 2025-01-01 RX ADMIN — CASTOR OIL AND BALSAM, PERU 1 APPLICATION: 788; 87 OINTMENT TOPICAL at 20:40

## 2025-01-01 RX ADMIN — SODIUM ZIRCONIUM CYCLOSILICATE 10 G: 10 POWDER, FOR SUSPENSION ORAL at 14:39

## 2025-01-01 RX ADMIN — CASTOR OIL AND BALSAM, PERU 1 APPLICATION: 788; 87 OINTMENT TOPICAL at 20:45

## 2025-01-01 RX ADMIN — HEPARIN SODIUM 5000 UNITS: 5000 INJECTION INTRAVENOUS; SUBCUTANEOUS at 05:23

## 2025-01-01 RX ADMIN — MEROPENEM 1000 MG: 1 INJECTION INTRAVENOUS at 14:41

## 2025-01-01 RX ADMIN — MIDAZOLAM HYDROCHLORIDE 5 MG/HR: 1 INJECTION, SOLUTION INTRAVENOUS at 12:10

## 2025-01-01 RX ADMIN — INSULIN HUMAN 2 UNITS: 100 INJECTION, SOLUTION PARENTERAL at 05:51

## 2025-01-01 RX ADMIN — Medication 300 MCG/HR: at 13:07

## 2025-01-01 RX ADMIN — CLONIDINE HYDROCHLORIDE 0.2 MG: 0.2 TABLET ORAL at 21:58

## 2025-01-01 RX ADMIN — VALPROIC ACID 150 MG: 250 SOLUTION ORAL at 20:29

## 2025-01-01 RX ADMIN — Medication 200 MCG/HR: at 01:34

## 2025-01-01 RX ADMIN — CALCIUM GLUCONATE 1000 MG: 20 INJECTION, SOLUTION INTRAVENOUS at 18:58

## 2025-01-01 RX ADMIN — VALPROIC ACID 150 MG: 250 SOLUTION ORAL at 21:25

## 2025-01-01 RX ADMIN — DOCUSATE SODIUM 100 MG: 50 LIQUID ORAL at 08:06

## 2025-01-01 RX ADMIN — PHENOBARBITAL SODIUM 65 MG: 65 INJECTION INTRAMUSCULAR at 13:03

## 2025-01-01 RX ADMIN — PANTOPRAZOLE SODIUM 40 MG: 40 INJECTION, POWDER, FOR SOLUTION INTRAVENOUS at 20:29

## 2025-01-01 RX ADMIN — Medication 200 MCG/HR: at 03:34

## 2025-01-01 RX ADMIN — INSULIN HUMAN 10 UNITS: 100 INJECTION, SOLUTION PARENTERAL at 18:48

## 2025-01-01 RX ADMIN — PROPOFOL 5 MCG/KG/MIN: 10 INJECTION, EMULSION INTRAVENOUS at 23:19

## 2025-01-01 RX ADMIN — Medication 50 MCG/HR: at 22:46

## 2025-01-01 RX ADMIN — PROPOFOL 30 MCG/KG/MIN: 10 INJECTION, EMULSION INTRAVENOUS at 06:09

## 2025-01-01 RX ADMIN — Medication 250 MCG/HR: at 12:54

## 2025-01-01 RX ADMIN — DOCUSATE SODIUM 100 MG: 50 LIQUID ORAL at 08:25

## 2025-01-01 RX ADMIN — MUPIROCIN 1 APPLICATION: 20 OINTMENT TOPICAL at 21:26

## 2025-01-01 RX ADMIN — PROPOFOL 15 MCG/KG/MIN: 10 INJECTION, EMULSION INTRAVENOUS at 09:21

## 2025-01-01 RX ADMIN — MEROPENEM 1000 MG: 1 INJECTION INTRAVENOUS at 13:12

## 2025-01-01 RX ADMIN — FENTANYL CITRATE 100 MCG: 50 INJECTION, SOLUTION INTRAMUSCULAR; INTRAVENOUS at 22:27

## 2025-01-01 RX ADMIN — AMLODIPINE BESYLATE 10 MG: 10 TABLET ORAL at 20:39

## 2025-01-01 RX ADMIN — DEXTROSE MONOHYDRATE 25 G: 25 INJECTION, SOLUTION INTRAVENOUS at 23:04

## 2025-01-01 RX ADMIN — PANTOPRAZOLE SODIUM 40 MG: 40 INJECTION, POWDER, FOR SOLUTION INTRAVENOUS at 20:40

## 2025-01-01 RX ADMIN — DEXTROSE MONOHYDRATE 50 ML/HR: 100 INJECTION, SOLUTION INTRAVENOUS at 18:51

## 2025-01-01 RX ADMIN — HYDRALAZINE HYDROCHLORIDE 100 MG: 50 TABLET ORAL at 15:07

## 2025-01-01 RX ADMIN — HYDRALAZINE HYDROCHLORIDE 100 MG: 50 TABLET ORAL at 16:30

## 2025-01-01 RX ADMIN — Medication 50 MG: at 22:31

## 2025-01-01 RX ADMIN — MIDAZOLAM HYDROCHLORIDE 3 MG/HR: 1 INJECTION, SOLUTION INTRAVENOUS at 10:19

## 2025-01-01 RX ADMIN — METOLAZONE 10 MG: 5 TABLET ORAL at 13:24

## 2025-01-01 RX ADMIN — SODIUM ZIRCONIUM CYCLOSILICATE 10 G: 10 POWDER, FOR SUSPENSION ORAL at 21:30

## 2025-01-01 RX ADMIN — Medication 200 MCG/HR: at 12:53

## 2025-01-01 RX ADMIN — Medication 200 MCG/HR: at 01:47

## 2025-01-01 RX ADMIN — CLONIDINE HYDROCHLORIDE 0.2 MG: 0.2 TABLET ORAL at 06:08

## 2025-01-01 RX ADMIN — PANTOPRAZOLE SODIUM 40 MG: 40 INJECTION, POWDER, FOR SOLUTION INTRAVENOUS at 09:20

## 2025-01-01 RX ADMIN — MEROPENEM 1000 MG: 1 INJECTION INTRAVENOUS at 05:05

## 2025-01-01 RX ADMIN — CASTOR OIL AND BALSAM, PERU 1 APPLICATION: 788; 87 OINTMENT TOPICAL at 08:21

## 2025-01-01 RX ADMIN — DOCUSATE SODIUM 100 MG: 50 LIQUID ORAL at 07:42

## 2025-01-01 RX ADMIN — TERAZOSIN HYDROCHLORIDE ANHYDROUS 2 MG: 2 CAPSULE ORAL at 20:41

## 2025-01-01 RX ADMIN — PROPOFOL 10 MCG/KG/MIN: 10 INJECTION, EMULSION INTRAVENOUS at 01:41

## 2025-01-01 RX ADMIN — INSULIN HUMAN 2 UNITS: 100 INJECTION, SOLUTION PARENTERAL at 13:11

## 2025-01-01 RX ADMIN — HYDRALAZINE HYDROCHLORIDE 100 MG: 50 TABLET ORAL at 20:52

## 2025-01-01 RX ADMIN — CASTOR OIL AND BALSAM, PERU 1 APPLICATION: 788; 87 OINTMENT TOPICAL at 21:03

## 2025-01-01 RX ADMIN — ALBUTEROL SULFATE 10 MG: 2.5 SOLUTION RESPIRATORY (INHALATION) at 18:58

## 2025-01-01 RX ADMIN — AMLODIPINE BESYLATE 10 MG: 10 TABLET ORAL at 21:03

## 2025-01-01 RX ADMIN — MEROPENEM 1000 MG: 1 INJECTION INTRAVENOUS at 21:45

## 2025-01-01 RX ADMIN — MEROPENEM 1000 MG: 1 INJECTION INTRAVENOUS at 21:28

## 2025-01-01 RX ADMIN — MEROPENEM 1000 MG: 1 INJECTION INTRAVENOUS at 21:03

## 2025-01-01 RX ADMIN — HEPARIN SODIUM 5000 UNITS: 5000 INJECTION INTRAVENOUS; SUBCUTANEOUS at 15:07

## 2025-01-01 RX ADMIN — DOCUSATE SODIUM 100 MG: 50 LIQUID ORAL at 20:41

## 2025-01-01 RX ADMIN — VALPROIC ACID 150 MG: 250 SOLUTION ORAL at 20:40

## 2025-01-01 RX ADMIN — PANTOPRAZOLE SODIUM 40 MG: 40 INJECTION, POWDER, FOR SOLUTION INTRAVENOUS at 08:01

## 2025-01-01 RX ADMIN — DEXTROSE MONOHYDRATE 25 G: 25 INJECTION, SOLUTION INTRAVENOUS at 06:22

## 2025-01-01 RX ADMIN — DEXTROSE MONOHYDRATE 50 ML/HR: 100 INJECTION, SOLUTION INTRAVENOUS at 00:25

## 2025-01-01 RX ADMIN — SODIUM ZIRCONIUM CYCLOSILICATE 10 G: 10 POWDER, FOR SUSPENSION ORAL at 20:47

## 2025-01-01 RX ADMIN — PANTOPRAZOLE SODIUM 40 MG: 40 INJECTION, POWDER, FOR SOLUTION INTRAVENOUS at 20:45

## 2025-01-01 RX ADMIN — ETOMIDATE 20 MG: 2 INJECTION, SOLUTION INTRAVENOUS at 22:30

## 2025-01-01 RX ADMIN — PANTOPRAZOLE SODIUM 40 MG: 40 INJECTION, POWDER, FOR SOLUTION INTRAVENOUS at 07:43

## 2025-01-01 RX ADMIN — SODIUM ZIRCONIUM CYCLOSILICATE 10 G: 10 POWDER, FOR SUSPENSION ORAL at 21:50

## 2025-01-01 RX ADMIN — PANTOPRAZOLE SODIUM 40 MG: 40 INJECTION, POWDER, FOR SOLUTION INTRAVENOUS at 20:52

## 2025-01-01 RX ADMIN — HYDRALAZINE HYDROCHLORIDE 100 MG: 50 TABLET ORAL at 14:33

## 2025-01-01 RX ADMIN — PROPOFOL 40 MCG/KG/MIN: 10 INJECTION, EMULSION INTRAVENOUS at 17:56

## 2025-01-01 RX ADMIN — MEROPENEM 1000 MG: 1 INJECTION INTRAVENOUS at 15:06

## 2025-01-01 RX ADMIN — PANTOPRAZOLE SODIUM 40 MG: 40 INJECTION, POWDER, FOR SOLUTION INTRAVENOUS at 09:11

## 2025-01-01 RX ADMIN — BUMETANIDE 2 MG: 0.25 INJECTION INTRAMUSCULAR; INTRAVENOUS at 13:23

## 2025-01-01 RX ADMIN — Medication 300 MCG/HR: at 06:23

## 2025-01-01 RX ADMIN — HEPARIN SODIUM 5000 UNITS: 5000 INJECTION INTRAVENOUS; SUBCUTANEOUS at 21:03

## 2025-01-01 RX ADMIN — MEROPENEM 1000 MG: 1 INJECTION INTRAVENOUS at 16:06

## 2025-01-01 RX ADMIN — PROPOFOL 25 MCG/KG/MIN: 10 INJECTION, EMULSION INTRAVENOUS at 07:25

## 2025-01-01 RX ADMIN — LORAZEPAM 2 MG: 2 INJECTION INTRAMUSCULAR; INTRAVENOUS at 09:01

## 2025-01-01 RX ADMIN — Medication 200 MCG/HR: at 15:39

## 2025-01-01 RX ADMIN — DEXTROSE MONOHYDRATE 25 G: 25 INJECTION, SOLUTION INTRAVENOUS at 18:48

## 2025-01-01 RX ADMIN — MEROPENEM 1000 MG: 1 INJECTION INTRAVENOUS at 06:09

## 2025-01-01 RX ADMIN — MEROPENEM 1000 MG: 1 INJECTION INTRAVENOUS at 12:55

## 2025-01-01 RX ADMIN — Medication 200 MCG/HR: at 13:53

## 2025-01-01 RX ADMIN — ETOMIDATE 20 MG: 2 INJECTION, SOLUTION INTRAVENOUS at 22:27

## 2025-01-01 RX ADMIN — PANTOPRAZOLE SODIUM 40 MG: 40 INJECTION, POWDER, FOR SOLUTION INTRAVENOUS at 21:03

## 2025-01-01 RX ADMIN — FUROSEMIDE 40 MG: 10 INJECTION, SOLUTION INTRAMUSCULAR; INTRAVENOUS at 00:24

## 2025-01-01 RX ADMIN — VALPROIC ACID 150 MG: 250 SOLUTION ORAL at 08:01

## 2025-01-01 RX ADMIN — PIPERACILLIN AND TAZOBACTAM 3.38 G: 3; .375 INJECTION, POWDER, LYOPHILIZED, FOR SOLUTION INTRAVENOUS at 09:59

## 2025-01-01 RX ADMIN — HEPARIN SODIUM 5000 UNITS: 5000 INJECTION INTRAVENOUS; SUBCUTANEOUS at 14:42

## 2025-01-01 RX ADMIN — MEROPENEM 1000 MG: 1 INJECTION INTRAVENOUS at 05:51

## 2025-01-01 RX ADMIN — TERAZOSIN HYDROCHLORIDE ANHYDROUS 2 MG: 2 CAPSULE ORAL at 21:03

## 2025-01-01 RX ADMIN — PANTOPRAZOLE SODIUM 40 MG: 40 INJECTION, POWDER, FOR SOLUTION INTRAVENOUS at 08:06

## 2025-01-01 RX ADMIN — CASTOR OIL AND BALSAM, PERU 1 APPLICATION: 788; 87 OINTMENT TOPICAL at 20:29

## 2025-01-01 RX ADMIN — DONEPEZIL HYDROCHLORIDE 10 MG: 10 TABLET, FILM COATED ORAL at 21:45

## 2025-01-01 RX ADMIN — HEPARIN SODIUM 5000 UNITS: 5000 INJECTION INTRAVENOUS; SUBCUTANEOUS at 21:54

## 2025-01-01 RX ADMIN — MUPIROCIN 1 APPLICATION: 20 OINTMENT TOPICAL at 07:44

## 2025-01-01 RX ADMIN — PANTOPRAZOLE SODIUM 40 MG: 40 INJECTION, POWDER, FOR SOLUTION INTRAVENOUS at 20:41

## 2025-01-01 RX ADMIN — PIPERACILLIN AND TAZOBACTAM 3.38 G: 3; .375 INJECTION, POWDER, LYOPHILIZED, FOR SOLUTION INTRAVENOUS at 17:56

## 2025-01-01 RX ADMIN — BUMETANIDE 2 MG: 0.25 INJECTION INTRAMUSCULAR; INTRAVENOUS at 17:20

## 2025-01-01 RX ADMIN — MIDAZOLAM HYDROCHLORIDE 2 MG/HR: 1 INJECTION, SOLUTION INTRAVENOUS at 12:54

## 2025-01-01 RX ADMIN — MUPIROCIN 1 APPLICATION: 20 OINTMENT TOPICAL at 08:06

## 2025-01-01 RX ADMIN — HEPARIN SODIUM 5000 UNITS: 5000 INJECTION INTRAVENOUS; SUBCUTANEOUS at 05:51

## 2025-01-01 RX ADMIN — Medication 200 MCG/HR: at 14:33

## 2025-01-01 RX ADMIN — DOCUSATE SODIUM 100 MG: 50 LIQUID ORAL at 21:25

## 2025-01-01 RX ADMIN — CASTOR OIL AND BALSAM, PERU 1 APPLICATION: 788; 87 OINTMENT TOPICAL at 20:52

## 2025-01-01 RX ADMIN — CASTOR OIL AND BALSAM, PERU 1 APPLICATION: 788; 87 OINTMENT TOPICAL at 08:07

## 2025-01-01 RX ADMIN — DOCUSATE SODIUM 100 MG: 50 LIQUID ORAL at 09:10

## 2025-01-01 RX ADMIN — MUPIROCIN 1 APPLICATION: 20 OINTMENT TOPICAL at 20:28

## 2025-01-01 RX ADMIN — PIPERACILLIN AND TAZOBACTAM 3.38 G: 3; .375 INJECTION, POWDER, LYOPHILIZED, FOR SOLUTION INTRAVENOUS at 02:42

## 2025-01-01 RX ADMIN — MUPIROCIN 1 APPLICATION: 20 OINTMENT TOPICAL at 08:24

## 2025-01-01 RX ADMIN — Medication 300 MCG/HR: at 21:53

## 2025-01-01 RX ADMIN — ALBUTEROL SULFATE 2.5 MG: 2.5 SOLUTION RESPIRATORY (INHALATION) at 19:51

## 2025-01-01 RX ADMIN — MEROPENEM 1000 MG: 1 INJECTION INTRAVENOUS at 05:23

## 2025-01-01 RX ADMIN — HEPARIN SODIUM 5000 UNITS: 5000 INJECTION INTRAVENOUS; SUBCUTANEOUS at 21:02

## 2025-01-01 RX ADMIN — PROPOFOL 30 MCG/KG/MIN: 10 INJECTION, EMULSION INTRAVENOUS at 09:12

## 2025-01-01 RX ADMIN — SODIUM BICARBONATE 50 MEQ: 84 INJECTION INTRAVENOUS at 18:49

## 2025-01-01 RX ADMIN — PANTOPRAZOLE SODIUM 40 MG: 40 INJECTION, POWDER, FOR SOLUTION INTRAVENOUS at 08:24

## 2025-01-01 RX ADMIN — MEROPENEM 1000 MG: 1 INJECTION INTRAVENOUS at 21:59

## 2025-01-01 RX ADMIN — VALPROIC ACID 150 MG: 250 SOLUTION ORAL at 08:24

## 2025-01-01 RX ADMIN — HEPARIN SODIUM 5000 UNITS: 5000 INJECTION INTRAVENOUS; SUBCUTANEOUS at 06:11

## 2025-01-01 RX ADMIN — INSULIN HUMAN 5 UNITS: 100 INJECTION, SOLUTION PARENTERAL at 06:26

## 2025-01-01 RX ADMIN — SODIUM ZIRCONIUM CYCLOSILICATE 10 G: 10 POWDER, FOR SUSPENSION ORAL at 05:51

## 2025-01-01 RX ADMIN — FUROSEMIDE 40 MG: 10 INJECTION, SOLUTION INTRAMUSCULAR; INTRAVENOUS at 18:49

## 2025-01-01 RX ADMIN — DOCUSATE SODIUM 50MG AND SENNOSIDES 8.6MG 2 TABLET: 8.6; 5 TABLET, FILM COATED ORAL at 09:21

## 2025-01-01 RX ADMIN — VALPROIC ACID 150 MG: 250 SOLUTION ORAL at 07:43

## 2025-01-01 RX ADMIN — HYDRALAZINE HYDROCHLORIDE 100 MG: 50 TABLET ORAL at 21:03

## 2025-01-01 RX ADMIN — PIPERACILLIN AND TAZOBACTAM 3.38 G: 3; .375 INJECTION, POWDER, LYOPHILIZED, FOR SOLUTION INTRAVENOUS at 09:20

## 2025-01-01 RX ADMIN — VALPROIC ACID 150 MG: 250 SOLUTION ORAL at 22:15

## 2025-01-01 RX ADMIN — DEXTROSE MONOHYDRATE 50 ML/HR: 100 INJECTION, SOLUTION INTRAVENOUS at 14:17

## 2025-01-01 RX ADMIN — SODIUM ZIRCONIUM CYCLOSILICATE 10 G: 10 POWDER, FOR SUSPENSION ORAL at 06:07

## 2025-01-01 RX ADMIN — CLONIDINE HYDROCHLORIDE 0.2 MG: 0.2 TABLET ORAL at 14:33

## 2025-02-16 ENCOUNTER — APPOINTMENT (OUTPATIENT)
Dept: CT IMAGING | Facility: HOSPITAL | Age: 70
End: 2025-02-16
Payer: MEDICARE

## 2025-02-16 ENCOUNTER — HOSPITAL ENCOUNTER (EMERGENCY)
Facility: HOSPITAL | Age: 70
Discharge: NURSING, REHAB OR LONG-TERM CARE | End: 2025-02-16
Attending: EMERGENCY MEDICINE | Admitting: EMERGENCY MEDICINE
Payer: MEDICARE

## 2025-02-16 VITALS
WEIGHT: 177 LBS | SYSTOLIC BLOOD PRESSURE: 111 MMHG | BODY MASS INDEX: 23.98 KG/M2 | RESPIRATION RATE: 16 BRPM | HEIGHT: 72 IN | TEMPERATURE: 97.6 F | OXYGEN SATURATION: 93 % | DIASTOLIC BLOOD PRESSURE: 72 MMHG | HEART RATE: 59 BPM

## 2025-02-16 DIAGNOSIS — S09.90XA CLOSED HEAD INJURY, INITIAL ENCOUNTER: ICD-10-CM

## 2025-02-16 DIAGNOSIS — S00.83XA CONTUSION OF FACE, INITIAL ENCOUNTER: Primary | ICD-10-CM

## 2025-02-16 DIAGNOSIS — S09.93XA DENTAL INJURY, INITIAL ENCOUNTER: ICD-10-CM

## 2025-02-16 DIAGNOSIS — R04.0 EPISTAXIS: ICD-10-CM

## 2025-02-16 DIAGNOSIS — E87.5 HYPERKALEMIA: ICD-10-CM

## 2025-02-16 LAB
ALBUMIN SERPL-MCNC: 3.9 G/DL (ref 3.5–5.2)
ALBUMIN/GLOB SERPL: 1.1 G/DL
ALP SERPL-CCNC: 93 U/L (ref 39–117)
ALT SERPL W P-5'-P-CCNC: 45 U/L (ref 1–41)
ANION GAP SERPL CALCULATED.3IONS-SCNC: 8 MMOL/L (ref 5–15)
ANION GAP SERPL CALCULATED.3IONS-SCNC: 9 MMOL/L (ref 5–15)
AST SERPL-CCNC: 51 U/L (ref 1–40)
BASOPHILS # BLD AUTO: 0.02 10*3/MM3 (ref 0–0.2)
BASOPHILS NFR BLD AUTO: 0.3 % (ref 0–1.5)
BILIRUB SERPL-MCNC: <0.2 MG/DL (ref 0–1.2)
BUN SERPL-MCNC: 31 MG/DL (ref 8–23)
BUN SERPL-MCNC: 33 MG/DL (ref 8–23)
BUN/CREAT SERPL: 25.8 (ref 7–25)
BUN/CREAT SERPL: 26.6 (ref 7–25)
CALCIUM SPEC-SCNC: 9.6 MG/DL (ref 8.6–10.5)
CALCIUM SPEC-SCNC: 9.8 MG/DL (ref 8.6–10.5)
CHLORIDE SERPL-SCNC: 103 MMOL/L (ref 98–107)
CHLORIDE SERPL-SCNC: 106 MMOL/L (ref 98–107)
CO2 SERPL-SCNC: 22 MMOL/L (ref 22–29)
CO2 SERPL-SCNC: 23 MMOL/L (ref 22–29)
CREAT SERPL-MCNC: 1.2 MG/DL (ref 0.76–1.27)
CREAT SERPL-MCNC: 1.24 MG/DL (ref 0.76–1.27)
DEPRECATED RDW RBC AUTO: 48.8 FL (ref 37–54)
EGFRCR SERPLBLD CKD-EPI 2021: 63.7 ML/MIN/1.73
EGFRCR SERPLBLD CKD-EPI 2021: 66.3 ML/MIN/1.73
EOSINOPHIL # BLD AUTO: 0.05 10*3/MM3 (ref 0–0.4)
EOSINOPHIL NFR BLD AUTO: 0.7 % (ref 0.3–6.2)
ERYTHROCYTE [DISTWIDTH] IN BLOOD BY AUTOMATED COUNT: 14.4 % (ref 12.3–15.4)
GLOBULIN UR ELPH-MCNC: 3.5 GM/DL
GLUCOSE BLDC GLUCOMTR-MCNC: 107 MG/DL (ref 70–130)
GLUCOSE BLDC GLUCOMTR-MCNC: 36 MG/DL (ref 70–130)
GLUCOSE BLDC GLUCOMTR-MCNC: 65 MG/DL (ref 70–130)
GLUCOSE BLDC GLUCOMTR-MCNC: 90 MG/DL (ref 70–130)
GLUCOSE SERPL-MCNC: 103 MG/DL (ref 65–99)
GLUCOSE SERPL-MCNC: 62 MG/DL (ref 65–99)
HCT VFR BLD AUTO: 27.3 % (ref 37.5–51)
HGB BLD-MCNC: 8.7 G/DL (ref 13–17.7)
IMM GRANULOCYTES # BLD AUTO: 0.02 10*3/MM3 (ref 0–0.05)
IMM GRANULOCYTES NFR BLD AUTO: 0.3 % (ref 0–0.5)
LYMPHOCYTES # BLD AUTO: 1.19 10*3/MM3 (ref 0.7–3.1)
LYMPHOCYTES NFR BLD AUTO: 17.8 % (ref 19.6–45.3)
MCH RBC QN AUTO: 29.6 PG (ref 26.6–33)
MCHC RBC AUTO-ENTMCNC: 31.9 G/DL (ref 31.5–35.7)
MCV RBC AUTO: 92.9 FL (ref 79–97)
MONOCYTES # BLD AUTO: 0.7 10*3/MM3 (ref 0.1–0.9)
MONOCYTES NFR BLD AUTO: 10.4 % (ref 5–12)
NEUTROPHILS NFR BLD AUTO: 4.72 10*3/MM3 (ref 1.7–7)
NEUTROPHILS NFR BLD AUTO: 70.5 % (ref 42.7–76)
NRBC BLD AUTO-RTO: 0 /100 WBC (ref 0–0.2)
PLATELET # BLD AUTO: 230 10*3/MM3 (ref 140–450)
PMV BLD AUTO: 10.7 FL (ref 6–12)
POTASSIUM SERPL-SCNC: 5.5 MMOL/L (ref 3.5–5.2)
POTASSIUM SERPL-SCNC: 6.5 MMOL/L (ref 3.5–5.2)
PROT SERPL-MCNC: 7.4 G/DL (ref 6–8.5)
QT INTERVAL: 372 MS
QTC INTERVAL: 383 MS
RBC # BLD AUTO: 2.94 10*6/MM3 (ref 4.14–5.8)
SODIUM SERPL-SCNC: 134 MMOL/L (ref 136–145)
SODIUM SERPL-SCNC: 137 MMOL/L (ref 136–145)
VALPROATE SERPL-MCNC: 6 MCG/ML (ref 50–125)
WBC NRBC COR # BLD AUTO: 6.7 10*3/MM3 (ref 3.4–10.8)

## 2025-02-16 PROCEDURE — 70486 CT MAXILLOFACIAL W/O DYE: CPT

## 2025-02-16 PROCEDURE — 85025 COMPLETE CBC W/AUTO DIFF WBC: CPT | Performed by: EMERGENCY MEDICINE

## 2025-02-16 PROCEDURE — 80164 ASSAY DIPROPYLACETIC ACD TOT: CPT | Performed by: EMERGENCY MEDICINE

## 2025-02-16 PROCEDURE — 25010000002 FUROSEMIDE PER 20 MG: Performed by: EMERGENCY MEDICINE

## 2025-02-16 PROCEDURE — 63710000001 INSULIN REGULAR HUMAN PER 5 UNITS: Performed by: EMERGENCY MEDICINE

## 2025-02-16 PROCEDURE — 96374 THER/PROPH/DIAG INJ IV PUSH: CPT

## 2025-02-16 PROCEDURE — 82948 REAGENT STRIP/BLOOD GLUCOSE: CPT

## 2025-02-16 PROCEDURE — 80053 COMPREHEN METABOLIC PANEL: CPT | Performed by: EMERGENCY MEDICINE

## 2025-02-16 PROCEDURE — 99284 EMERGENCY DEPT VISIT MOD MDM: CPT

## 2025-02-16 PROCEDURE — 93005 ELECTROCARDIOGRAM TRACING: CPT | Performed by: EMERGENCY MEDICINE

## 2025-02-16 PROCEDURE — 94640 AIRWAY INHALATION TREATMENT: CPT

## 2025-02-16 PROCEDURE — 36415 COLL VENOUS BLD VENIPUNCTURE: CPT

## 2025-02-16 PROCEDURE — 72125 CT NECK SPINE W/O DYE: CPT

## 2025-02-16 PROCEDURE — 70450 CT HEAD/BRAIN W/O DYE: CPT

## 2025-02-16 PROCEDURE — 96375 TX/PRO/DX INJ NEW DRUG ADDON: CPT

## 2025-02-16 PROCEDURE — 96376 TX/PRO/DX INJ SAME DRUG ADON: CPT

## 2025-02-16 RX ORDER — DEXTROSE MONOHYDRATE 25 G/50ML
50 INJECTION, SOLUTION INTRAVENOUS
Status: DISCONTINUED | OUTPATIENT
Start: 2025-02-16 | End: 2025-02-16 | Stop reason: HOSPADM

## 2025-02-16 RX ORDER — PNV NO.95/FERROUS FUM/FOLIC AC 28MG-0.8MG
325 TABLET ORAL
COMMUNITY

## 2025-02-16 RX ORDER — ALBUTEROL SULFATE 0.83 MG/ML
10 SOLUTION RESPIRATORY (INHALATION) ONCE
Status: COMPLETED | OUTPATIENT
Start: 2025-02-16 | End: 2025-02-16

## 2025-02-16 RX ORDER — TRANEXAMIC ACID 100 MG/ML
500 INJECTION, SOLUTION INTRAVENOUS ONCE
Status: COMPLETED | OUTPATIENT
Start: 2025-02-16 | End: 2025-02-16

## 2025-02-16 RX ORDER — LIDOCAINE HYDROCHLORIDE 40 MG/ML
1 SOLUTION TOPICAL ONCE
Status: COMPLETED | OUTPATIENT
Start: 2025-02-16 | End: 2025-02-16

## 2025-02-16 RX ORDER — DOXAZOSIN 2 MG/1
2 TABLET ORAL NIGHTLY
COMMUNITY

## 2025-02-16 RX ORDER — SODIUM CHLORIDE 0.9 % (FLUSH) 0.9 %
10 SYRINGE (ML) INJECTION AS NEEDED
Status: DISCONTINUED | OUTPATIENT
Start: 2025-02-16 | End: 2025-02-16 | Stop reason: HOSPADM

## 2025-02-16 RX ORDER — DEXTROSE MONOHYDRATE 25 G/50ML
25 INJECTION, SOLUTION INTRAVENOUS ONCE
Status: COMPLETED | OUTPATIENT
Start: 2025-02-16 | End: 2025-02-16

## 2025-02-16 RX ORDER — FUROSEMIDE 10 MG/ML
40 INJECTION INTRAMUSCULAR; INTRAVENOUS ONCE
Status: COMPLETED | OUTPATIENT
Start: 2025-02-16 | End: 2025-02-16

## 2025-02-16 RX ORDER — DEXTROSE MONOHYDRATE 25 G/50ML
INJECTION, SOLUTION INTRAVENOUS
Status: COMPLETED
Start: 2025-02-16 | End: 2025-02-16

## 2025-02-16 RX ADMIN — FUROSEMIDE 40 MG: 10 INJECTION, SOLUTION INTRAMUSCULAR; INTRAVENOUS at 12:17

## 2025-02-16 RX ADMIN — TRANEXAMIC ACID 500 MG: 100 INJECTION, SOLUTION INTRAVENOUS at 12:20

## 2025-02-16 RX ADMIN — LIDOCAINE HYDROCHLORIDE 1 APPLICATION: 40 SOLUTION TOPICAL at 12:16

## 2025-02-16 RX ADMIN — DEXTROSE MONOHYDRATE 25 G: 25 INJECTION, SOLUTION INTRAVENOUS at 12:14

## 2025-02-16 RX ADMIN — PHENYLEPHRINE HYDROCHLORIDE 2 SPRAY: 0.25 SPRAY NASAL at 12:19

## 2025-02-16 RX ADMIN — ALBUTEROL SULFATE 10 MG: 2.5 SOLUTION RESPIRATORY (INHALATION) at 13:00

## 2025-02-16 RX ADMIN — DEXTROSE MONOHYDRATE 50 ML: 25 INJECTION, SOLUTION INTRAVENOUS at 13:20

## 2025-02-16 RX ADMIN — SODIUM ZIRCONIUM CYCLOSILICATE 10 G: 10 POWDER, FOR SUSPENSION ORAL at 12:18

## 2025-02-16 RX ADMIN — INSULIN HUMAN 10 UNITS: 100 INJECTION, SOLUTION PARENTERAL at 12:18

## 2025-02-16 NOTE — ED PROVIDER NOTES
[place]    EMERGENCY DEPARTMENT ENCOUNTER      Pt Name: Omkar Nava  MRN: [id number]  YOB: 1955  Date of evaluation: 2/16/2025  Provider: Kings Gardner DO    CHIEF COMPLAINT       Chief Complaint   Patient presents with    Fall       HPI  Stated Reason for Visit: pt to ed via ems from [facility] for c/o a unwitnessed fall. pt states right front tooth is loose. neg loc, blood thinners, neck pain. gcs 14 baseline. hx. of dementia. History Obtained From: EMS       HISTORY OF PRESENT ILLNESS  (Location/Symptom, Timing/Onset, Context/Setting, Quality, Duration, Modifying Factors, Severity.)   Omkar Nava is a 69 y.o. male who presents to the emergency department for evaluation from [facility] facility secondary to fall from his wheelchair which occurred just prior to arrival.  The patient is blind, wheelchair-bound at baseline, states he fell forward hitting his face, nose on the ground.  Is unsure why he fell forward but he notes no seizure activity, denies any headache or neck pain, denies any upper or lower extremity pain or back pain.  Feels with his tongue that he has some loose teeth after his fall, feels some soreness along the bridge of his nose.  He does not take any blood thinning medications, denies any chest pain difficulty breathing, does not have any other acute systemic complaints at this time.        Nursing notes were reviewed.      PAST MEDICAL HISTORY     Past Medical History:   Diagnosis Date    Anemia     Dementia     Diabetes mellitus     Dysphagia     GERD (gastroesophageal reflux disease)     History of alcohol abuse     History of cocaine use     History of marijuana use     Hypertension     Osteomyelitis     Poor historian     records obtained from nursing home records & his family    Visual impairment          SURGICAL HISTORY       Past Surgical History:   Procedure Laterality Date    AMPUTATION FOOT Left 10/18/2022    Procedure: PARTIAL FIRST RAY AMPUTATION  LEFT;  Surgeon: Yeison Petty MD;  Location:  [facility];  Service: Orthopedics;  Laterality: Left;    AMPUTATION FOOT Left 12/5/2022    Procedure: Transmetatarsal of Left Foot;  Surgeon: Yeison Petty MD;  Location:  [facility];  Service: Orthopedics;  Laterality: Left;    ENDOSCOPY N/A 3/14/2024    Procedure: ESOPHAGOGASTRODUODENOSCOPY WITH JEJUNAL TUBE INSERTION AT BEDSIDE;  Surgeon: Brunner, Mark I, MD;  Location:  [facility];  Service: Gastroenterology;  Laterality: N/A;    ENDOSCOPY W/ PEG TUBE PLACEMENT N/A 4/1/2024    Procedure: ESOPHAGOGASTRODUODENOSCOPY WITH PERCUTANEOUS ENDOSCOPIC GASTROSTOMY TUBE INSERTION;  Surgeon: Brunner, Mark I, MD;  Location:  [facility];  Service: Gastroenterology;  Laterality: N/A;  EGD with PEG placement.  Secured at 3.5 cm    EYE SURGERY           CURRENT MEDICATIONS       Current Facility-Administered Medications:     dextrose (D50W) (25 g/50 mL) IV injection 50 mL, 50 mL, Intravenous, Q1H PRN, Kings Gardner DO, 50 mL at 02/16/25 1320    phenylephrine (CISCO-SYNEPHRINE) 0.25 % nasal spray 2 spray, 2 spray, Each Nare, Q4H PRN, Kings Gardner DO, 2 spray at 02/16/25 1219    Insert Peripheral IV, , , Once **AND** sodium chloride 0.9 % flush 10 mL, 10 mL, Intravenous, PRN, Kings Gardner DO    Current Outpatient Medications:     acetaminophen (TYLENOL) 160 MG/5ML solution, Administer 20.3 mL per G tube Every 6 (Six) Hours As Needed for Mild Pain or Fever., Disp: , Rfl:     amLODIPine (NORVASC) 10 MG tablet, Administer 1 tablet per G tube Daily., Disp: , Rfl:     brimonidine (ALPHAGAN) 0.2 % ophthalmic solution, Administer 1 drop to both eyes 3 (Three) Times a Day., Disp: , Rfl:     carvedilol (COREG) 12.5 MG tablet, Patient takes Coreg 12.5mg daily. Hold until follow up with PCP. (Patient taking differently: Administer 1 tablet per G tube Daily.), Disp: , Rfl:     cholecalciferol (VITAMIN D3) 25 MCG (1000 UT) tablet, Administer 2 tablets per G tube  Daily., Disp: , Rfl:     cloNIDine (CATAPRES) 0.2 MG tablet, Patient takes 0.2mg q8hrs. Hold until follow up with PCP. (Patient taking differently: Administer 1 tablet per G tube Every 8 (Eight) Hours As Needed for High Blood Pressure. Give for SBP>180), Disp: , Rfl:     donepezil (Aricept) 10 MG tablet, Take 1 tablet by mouth Every Night., Disp: 30 tablet, Rfl: 11    doxazosin (CARDURA) 2 MG tablet, Take 1 tablet by mouth Every Night., Disp: , Rfl:     ferrous sulfate 325 (65 Fe) MG tablet, Administer 1 tablet per G tube Daily With Breakfast., Disp: , Rfl:     FLUoxetine (PROzac) 20 MG/5ML solution, Take 5 mL by mouth Daily., Disp: , Rfl:     hydrALAZINE (APRESOLINE) 100 MG tablet, Patient takes hydralazine 100mg q8hrs. Hold until follow up with PCP. (Patient taking differently: Administer 1 tablet per G tube 3 times a day. Hold for SBP less than 110), Disp: , Rfl:     lansoprazole (PREVACID SOLUTAB) 30 MG Tablet Delayed Release Dispersible disintegrating tablet, 1 tablet by Nasogastric route Every Morning., Disp: , Rfl:     metFORMIN (GLUCOPHAGE) 500 MG tablet, Administer 1 tablet per G tube 2 (Two) Times a Day With Meals., Disp: , Rfl:     polyethylene glycol (MIRALAX) 17 g packet, Take 17 g by mouth Daily. (Patient taking differently: 17 g Daily. G-tube), Disp: 30 each, Rfl: 0    sennosides (SENOKOT) 8.8 MG/5ML syrup, Administer 10 mL per G tube Every Night., Disp: , Rfl:     timolol (TIMOPTIC) 0.5 % ophthalmic solution, Administer 1 drop to both eyes 2 (Two) Times a Day., Disp: , Rfl:     Valproic Acid (DEPAKENE) 250 MG/5ML solution syrup, 3 mL by Per PEG Tube route Every 12 (Twelve) Hours., Disp: , Rfl:     ALLERGIES     Patient has no known allergies.    FAMILY HISTORY       Family History   Problem Relation Age of Onset    Diabetes Mother     Hypertension Mother     Hypertension Father     Diabetes Father     Diabetes Sister     Hypertension Brother     Diabetes Brother     Diabetes Maternal Grandmother      "Diabetes Maternal Grandfather     Hypertension Brother     Diabetes Brother           SOCIAL HISTORY       Social History     Socioeconomic History    Marital status: Single   Tobacco Use    Smoking status: Former     Current packs/day: 0.00     Average packs/day: 1 pack/day for 40.0 years (40.0 ttl pk-yrs)     Types: Cigarettes     Start date: 1977     Quit date: 2017     Years since quittin.7     Passive exposure: Past    Smokeless tobacco: Never   Vaping Use    Vaping status: Never Used   Substance and Sexual Activity    Alcohol use: Not Currently     Comment: histgry of alcohol abuse    Drug use: Yes     Types: Marijuana, \"Crack\" cocaine     Comment: PATIENT STATES \"IT'S BEEN A FEW DAYS\"    Sexual activity: Defer         PHYSICAL EXAM    (up to 7 for level 4, 8 or more for level 5)     Vitals:    25 1222 25 1230 25 1330 25 1400   BP: 174/74 178/75 155/60 151/56   BP Location:       Patient Position:       Pulse: 68 64 56 57   Resp:       Temp:       TempSrc:       SpO2: 94% 96% 99% 95%   Weight:       Height:           Physical Exam  General : Patient is awake, alert, oriented, in no acute distress, nontoxic appearing  HEENT: Pupils are equally round, patient prefers to a ocular deviation upwards to the right as he is legally blind, mild conjunctival injection.  Head is normocephalic, he has some swelling to the bridge of the nose, very scant amount of bleeding in the nares without any septal hematoma.  He has extremely poor dentition at baseline, one of his right upper incisors is loose, as well as his right lower incisor.  There is no tenderness along the mandible or maxillary bones themselves.  Cardiac: Heart regular rate, rhythm, no murmurs, rubs, or gallops  Lungs: Lungs are clear to auscultation, there is no wheezing, rhonchi, or rales. There is no use of accessory muscles  Chest wall: There is no tenderness to palpation over the chest wall or over ribs  Abdomen: Abdomen " is soft, nontender, nondistended. There are no firm or pulsatile masses, no rebound rigidity or guarding  Musculoskeletal: He is able to raise each arm and leg off the bed without difficulty pain or joint swelling, generalized muscle weakness is noted, no focal muscle deficits are appreciated  Neuro: Patient is awake, answer questions appropriately, LOC chronic comorbidities but no obvious or focal neurological deficit      DIAGNOSTIC RESULTS     EKG:  All EKGs are interpreted by the Emergency Department Physician who either signs or Co-signs this chart in the absence of a cardiologist.    ECG 12 Lead Electrolyte Imbalance   Final Result   Test Reason : Electrolyte Imbalance   Blood Pressure :   */*   mmHG   Vent. Rate :  64 BPM     Atrial Rate :  64 BPM      P-R Int : 230 ms          QRS Dur :  72 ms       QT Int : 372 ms       P-R-T Axes :  50  41  32 degrees     QTcB Int : 383 ms      Sinus rhythm with 1st degree AV block with premature atrial complexes   Otherwise normal ECG   When compared with ECG of 22-Nov-2024 18:10,   premature atrial complexes are now present   Confirmed by CAROLINE MONTOYA MD ([id number]) on 2/16/2025 12:38:08 PM      Referred By: LINDSAY           Confirmed By: CAROLINE MONTOYA MD          RADIOLOGY:     [x] Radiologist's Report Reviewed:  CT Head Without Contrast   Final Result   1. No evidence of acute trauma to the brain or calvarium.      2. Paranasal sinus disease as noted, and apparent dystrophic changes of the right optic globe.            CT SCAN OF THE FACIAL BONES WITHOUT CONTRAST: There is no evidence of displaced nasal bone fracture. There is slight irregularity of the anterior margin of the left nasal bone image 71/2 potentially a minute avulsion but not clearly acute trauma. The    bony nasal septum appears fenestrated is a normal, but intact. Bony nasal turbinates appear thinned/atrophic/eroded, perhaps from chronic paranasal sinus disease, but no acute trauma is suspected.        No orbital rim or floor fracture, or zygomatic arch fracture is seen. TM joints appear anatomic. No fracture of the mandible is seen. There is mild apical lucency around multiple lower teeth, perhaps developing apical abscess is.      There is more advanced odontogenic disease of the right upper teeth, with multiple right maxillary apical abscesses, extensive dental erosions, and also what appears to be a displaced right upper premolar, although this is thought to be due to chronic    disease rather than acute trauma. No fracture of the maxillary process is seen.      Review of the superficial soft tissues shows no obvious hematoma. Fat stranding anterior to the chin may represent mild bruising or edema. Incidental note is made of a 1.3 cm right thyroid nodule and multiple shotty bilateral cervical lymph nodes.      IMPRESSION:   1. Extensive odontogenic disease as noted, but no clearly acute trauma to the maxilla or mandible.      2. Questionable minute avulsion of the tip the left nasal bone, versus normal variant. No evidence of acute bony trauma elsewhere.            CT SCAN OF THE CERVICAL SPINE WITHOUT CONTRAST: There is multilevel degenerative disc disease, with moderate C5-6 and C6-7 degenerative disc space narrowing, and slight posterior subluxation of C3 on C4 associated with DDD, but no evidence of significant    subluxation, vertebral compression deformity or abnormality of posterior element alignment. Prevertebral soft tissues appear normal. Coronal images show a mild levoconvex scoliosis.      Axial images show no evidence of skull base trauma, no evidence of vertebral body fracture or posterior element fracture. Review of the axial soft tissue images shows the usual limited detail of the canal for non-myelographic scan but no gross evidence    of spinal stenosis is seen. No paraspinous hematoma is identified.       Shotty cervical lymph nodes, and a couple small right thyroid nodules are again  noted. Included right and left lung apices appear clear.      IMPRESSION: Cervical spine degenerative changes as noted. Incidental thyroid nodules. No evidence of acute cervical spine trauma, or gross evidence of cervical spine stenosis.                  Electronically Signed: Wisam Oquendo MD     2/16/2025 12:12 PM EST     Workstation ID: [id number]      CT Maxillofacial Without Contrast   Final Result   1. No evidence of acute trauma to the brain or calvarium.      2. Paranasal sinus disease as noted, and apparent dystrophic changes of the right optic globe.            CT SCAN OF THE FACIAL BONES WITHOUT CONTRAST: There is no evidence of displaced nasal bone fracture. There is slight irregularity of the anterior margin of the left nasal bone image 71/2 potentially a minute avulsion but not clearly acute trauma. The    bony nasal septum appears fenestrated is a normal, but intact. Bony nasal turbinates appear thinned/atrophic/eroded, perhaps from chronic paranasal sinus disease, but no acute trauma is suspected.       No orbital rim or floor fracture, or zygomatic arch fracture is seen. TM joints appear anatomic. No fracture of the mandible is seen. There is mild apical lucency around multiple lower teeth, perhaps developing apical abscess is.      There is more advanced odontogenic disease of the right upper teeth, with multiple right maxillary apical abscesses, extensive dental erosions, and also what appears to be a displaced right upper premolar, although this is thought to be due to chronic    disease rather than acute trauma. No fracture of the maxillary process is seen.      Review of the superficial soft tissues shows no obvious hematoma. Fat stranding anterior to the chin may represent mild bruising or edema. Incidental note is made of a 1.3 cm right thyroid nodule and multiple shotty bilateral cervical lymph nodes.      IMPRESSION:   1. Extensive odontogenic disease as noted, but no clearly acute trauma to the  maxilla or mandible.      2. Questionable minute avulsion of the tip the left nasal bone, versus normal variant. No evidence of acute bony trauma elsewhere.            CT SCAN OF THE CERVICAL SPINE WITHOUT CONTRAST: There is multilevel degenerative disc disease, with moderate C5-6 and C6-7 degenerative disc space narrowing, and slight posterior subluxation of C3 on C4 associated with DDD, but no evidence of significant    subluxation, vertebral compression deformity or abnormality of posterior element alignment. Prevertebral soft tissues appear normal. Coronal images show a mild levoconvex scoliosis.      Axial images show no evidence of skull base trauma, no evidence of vertebral body fracture or posterior element fracture. Review of the axial soft tissue images shows the usual limited detail of the canal for non-myelographic scan but no gross evidence    of spinal stenosis is seen. No paraspinous hematoma is identified.       Shotty cervical lymph nodes, and a couple small right thyroid nodules are again noted. Included right and left lung apices appear clear.      IMPRESSION: Cervical spine degenerative changes as noted. Incidental thyroid nodules. No evidence of acute cervical spine trauma, or gross evidence of cervical spine stenosis.                  Electronically Signed: Wisam Oquendo MD     2/16/2025 12:12 PM EST     Workstation ID: [id number]      CT Cervical Spine Without Contrast   Final Result   1. No evidence of acute trauma to the brain or calvarium.      2. Paranasal sinus disease as noted, and apparent dystrophic changes of the right optic globe.            CT SCAN OF THE FACIAL BONES WITHOUT CONTRAST: There is no evidence of displaced nasal bone fracture. There is slight irregularity of the anterior margin of the left nasal bone image 71/2 potentially a minute avulsion but not clearly acute trauma. The    bony nasal septum appears fenestrated is a normal, but intact. Bony nasal turbinates appear  thinned/atrophic/eroded, perhaps from chronic paranasal sinus disease, but no acute trauma is suspected.       No orbital rim or floor fracture, or zygomatic arch fracture is seen. TM joints appear anatomic. No fracture of the mandible is seen. There is mild apical lucency around multiple lower teeth, perhaps developing apical abscess is.      There is more advanced odontogenic disease of the right upper teeth, with multiple right maxillary apical abscesses, extensive dental erosions, and also what appears to be a displaced right upper premolar, although this is thought to be due to chronic    disease rather than acute trauma. No fracture of the maxillary process is seen.      Review of the superficial soft tissues shows no obvious hematoma. Fat stranding anterior to the chin may represent mild bruising or edema. Incidental note is made of a 1.3 cm right thyroid nodule and multiple shotty bilateral cervical lymph nodes.      IMPRESSION:   1. Extensive odontogenic disease as noted, but no clearly acute trauma to the maxilla or mandible.      2. Questionable minute avulsion of the tip the left nasal bone, versus normal variant. No evidence of acute bony trauma elsewhere.            CT SCAN OF THE CERVICAL SPINE WITHOUT CONTRAST: There is multilevel degenerative disc disease, with moderate C5-6 and C6-7 degenerative disc space narrowing, and slight posterior subluxation of C3 on C4 associated with DDD, but no evidence of significant    subluxation, vertebral compression deformity or abnormality of posterior element alignment. Prevertebral soft tissues appear normal. Coronal images show a mild levoconvex scoliosis.      Axial images show no evidence of skull base trauma, no evidence of vertebral body fracture or posterior element fracture. Review of the axial soft tissue images shows the usual limited detail of the canal for non-myelographic scan but no gross evidence    of spinal stenosis is seen. No paraspinous  hematoma is identified.       Shotty cervical lymph nodes, and a couple small right thyroid nodules are again noted. Included right and left lung apices appear clear.      IMPRESSION: Cervical spine degenerative changes as noted. Incidental thyroid nodules. No evidence of acute cervical spine trauma, or gross evidence of cervical spine stenosis.                  Electronically Signed: Wisam Oquendo MD     2/16/2025 12:12 PM EST     Workstation ID: [id number]          I ordered and independently reviewed the above noted radiographic studies.      I viewed images of CT head which showed no acute intracranial normalities per my independent interpretation.    See radiologist's dictation for official interpretation.        LABS:    I have reviewed and interpreted all of the currently available lab results from this visit (if applicable):  Results for orders placed or performed during the hospital encounter of 02/16/25   Valproic Acid Level, Total    Collection Time: 02/16/25 11:19 AM    Specimen: Blood   Result Value Ref Range    Valproic Acid 6.0 (L) 50.0 - 125.0 mcg/mL   Comprehensive Metabolic Panel    Collection Time: 02/16/25 11:19 AM    Specimen: Blood   Result Value Ref Range    Glucose 103 (H) 65 - 99 mg/dL    BUN 31 (H) 8 - 23 mg/dL    Creatinine 1.20 0.76 - 1.27 mg/dL    Sodium 137 136 - 145 mmol/L    Potassium 6.5 (C) 3.5 - 5.2 mmol/L    Chloride 106 98 - 107 mmol/L    CO2 22.0 22.0 - 29.0 mmol/L    Calcium 9.8 8.6 - 10.5 mg/dL    Total Protein 7.4 6.0 - 8.5 g/dL    Albumin 3.9 3.5 - 5.2 g/dL    ALT (SGPT) 45 (H) 1 - 41 U/L    AST (SGOT) 51 (H) 1 - 40 U/L    Alkaline Phosphatase 93 39 - 117 U/L    Total Bilirubin <0.2 0.0 - 1.2 mg/dL    Globulin 3.5 gm/dL    A/G Ratio 1.1 g/dL    BUN/Creatinine Ratio 25.8 (H) 7.0 - 25.0    Anion Gap 9.0 5.0 - 15.0 mmol/L    eGFR 66.3 >60.0 mL/min/1.73   CBC Auto Differential    Collection Time: 02/16/25 11:19 AM    Specimen: Blood   Result Value Ref Range    WBC 6.70 3.40 - 10.80  10*3/mm3    RBC 2.94 (L) 4.14 - 5.80 10*6/mm3    Hemoglobin 8.7 (L) 13.0 - 17.7 g/dL    Hematocrit 27.3 (L) 37.5 - 51.0 %    MCV 92.9 79.0 - 97.0 fL    MCH 29.6 26.6 - 33.0 pg    MCHC 31.9 31.5 - 35.7 g/dL    RDW 14.4 12.3 - 15.4 %    RDW-SD 48.8 37.0 - 54.0 fl    MPV 10.7 6.0 - 12.0 fL    Platelets 230 140 - 450 10*3/mm3    Neutrophil % 70.5 42.7 - 76.0 %    Lymphocyte % 17.8 (L) 19.6 - 45.3 %    Monocyte % 10.4 5.0 - 12.0 %    Eosinophil % 0.7 0.3 - 6.2 %    Basophil % 0.3 0.0 - 1.5 %    Immature Grans % 0.3 0.0 - 0.5 %    Neutrophils, Absolute 4.72 1.70 - 7.00 10*3/mm3    Lymphocytes, Absolute 1.19 0.70 - 3.10 10*3/mm3    Monocytes, Absolute 0.70 0.10 - 0.90 10*3/mm3    Eosinophils, Absolute 0.05 0.00 - 0.40 10*3/mm3    Basophils, Absolute 0.02 0.00 - 0.20 10*3/mm3    Immature Grans, Absolute 0.02 0.00 - 0.05 10*3/mm3    nRBC 0.0 0.0 - 0.2 /100 WBC   ECG 12 Lead Electrolyte Imbalance    Collection Time: 02/16/25 12:14 PM   Result Value Ref Range    QT Interval 372 ms    QTC Interval 383 ms   POC Glucose Once    Collection Time: 02/16/25 12:39 PM    Specimen: Blood   Result Value Ref Range    Glucose 107 70 - 130 mg/dL   POC Glucose Once    Collection Time: 02/16/25  1:14 PM    Specimen: Blood   Result Value Ref Range    Glucose 36 (C) 70 - 130 mg/dL   POC Glucose Once    Collection Time: 02/16/25  1:56 PM    Specimen: Blood   Result Value Ref Range    Glucose 65 (L) 70 - 130 mg/dL   Basic Metabolic Panel    Collection Time: 02/16/25  1:58 PM    Specimen: Blood   Result Value Ref Range    Glucose 62 (L) 65 - 99 mg/dL    BUN 33 (H) 8 - 23 mg/dL    Creatinine 1.24 0.76 - 1.27 mg/dL    Sodium 134 (L) 136 - 145 mmol/L    Potassium 5.5 (H) 3.5 - 5.2 mmol/L    Chloride 103 98 - 107 mmol/L    CO2 23.0 22.0 - 29.0 mmol/L    Calcium 9.6 8.6 - 10.5 mg/dL    BUN/Creatinine Ratio 26.6 (H) 7.0 - 25.0    Anion Gap 8.0 5.0 - 15.0 mmol/L    eGFR 63.7 >60.0 mL/min/1.73        If labs were ordered, I independently reviewed the  results and considered them in treating the patient.      EMERGENCY DEPARTMENT COURSE and DIFFERENTIAL DIAGNOSIS/MDM:   Vitals:  AS OF 14:30 EST    BP - 151/56  HR - 57  TEMP - 97.6 °F (36.4 °C) (Axillary)  O2 SATS - 95%      Orders placed during this visit:  Orders Placed This Encounter   Procedures    CT Head Without Contrast    CT Maxillofacial Without Contrast    CT Cervical Spine Without Contrast    Valproic Acid Level, Total    Comprehensive Metabolic Panel    CBC Auto Differential    Basic Metabolic Panel    POC Glucose Q1H    POC Glucose Once    POC Glucose Once    POC Glucose Once    ECG 12 Lead Electrolyte Imbalance    Insert Peripheral IV    CBC & Differential       All labs have been independently reviewed by me.  All radiology studies have been reviewed by me and the radiologist dictating the report.  All EKG's have been independently viewed and interpreted by me.      Discussion below represents my analysis of pertinent findings related to patient's condition, differential diagnosis, treatment plan and final disposition.    Differential diagnosis:  The differential diagnosis associated with the patient's presentation includes: Fall, closed head injury, intracranial hemorrhage, facial fracture, dental fracture, mandibular/maxillary injury, epistaxis    Additional sources  Discussed/ obtained information from independent historians:   [] Spouse  [] Parent  [] Family member  [] Friend  [x] EMS   [] Other:    External (non-ED) record review:   [] Inpatient record:   [] Office record:   [] Outpatient record:   [] Prior Outpatient labs:   [] Prior Outpatient radiology:   [] Primary Care record:   [] Outside ED record:   [] Other:     Patient's care impacted by:   [] Diabetes  [] Hypertension  [] CHF  [] Hyperlipidemia  [] Coronary Artery Disease   [] COPD   [] Cancer   [] Tobacco Abuse   [] Substance Abuse    [x] Other: Chronic debility, wheelchair-bound at baseline    Care significantly affected by Social  Determinants of Health (housing and economic circumstances, unemployment)    [] Yes     [x] No   If yes, Patient's care significantly limited by Social Determinants of Health including:   [] Inadequate housing   [] Low income   [] Alcoholism and drug addiction in family   [] Problems related to primary support group   [] Unemployment   [] Problems related to employment   [] Other Social Determinants of Health:       MEDICATIONS ADMINISTERED IN ED:  Medications   phenylephrine (CISCO-SYNEPHRINE) 0.25 % nasal spray 2 spray (2 sprays Each Nare Given by Other 2/16/25 1219)   sodium chloride 0.9 % flush 10 mL (has no administration in time range)   dextrose (D50W) (25 g/50 mL) IV injection 50 mL (50 mL Intravenous Given 2/16/25 1320)   lidocaine (XYLOCAINE) 4 % external solution 1 Application (1 Application Topical Given by Other 2/16/25 1216)   tranexamic acid 500 MG/5ML nasal (ED/Crit Care for epistaxis) - VIAL DISPENSE 500 mg (500 mg Nasal Given by Other 2/16/25 1220)   insulin regular (humuLIN R,novoLIN R) injection 10 Units (10 Units Intravenous Given 2/16/25 1218)   dextrose (D50W) (25 g/50 mL) IV injection 25 g (25 g Intravenous Given 2/16/25 1214)   albuterol (PROVENTIL) nebulizer solution 0.083% 2.5 mg/3mL (10 mg Nebulization Given 2/16/25 1300)   sodium zirconium cyclosilicate (LOKELMA) packet 10 g (10 g Oral Given 2/16/25 1218)   furosemide (LASIX) injection 40 mg (40 mg Intravenous Given 2/16/25 1217)              This is a pleasant 69-year-old male who is a wheelchair-bound, legally blind who had a fall from a seated position earlier this morning, has a soft tissue injury to the face and nasal region.  Slight amount of bleeding in the bilateral nares, he does have a small amount of bleeding from the upper and lower gingival region where he hit his mouth, has some loose teeth, is very poor dentition at baseline.  We did his CT head, maxillofacial for further assessment.  CT head maxillofacial and cervical spine  without acute abnormalities, extensive odontogenic disease is noted without any signs of mandibular or maxillary fracture.  He is on a tube feedings with his valproic acid and his level today is 6.0, he has been chronically subtherapeutic, no recent seizure history.  His kidney function liver function are stable his potassium is 6.5, patient given the hyperkalemic treatments.  EKG with normal sinus, no arrhythmia, normal intervals, T waves slightly peaked.  We did place transischemic acid, phenylephrine and topical lidocaine in the bilateral nares and on the dental avulsion injuries with improvement of the slight venous oozing.  We did monitor the patient for few hours in the ED. repeat BMP with normal kidney function, potassium has improved from 6.5-5.5.  The patient's daughter and POA is now at the bedside, he has been eating puréed food since he has been in the ED.  Remains awake and alert, bleeding is controlled out of the nares and the mouth.  Vital signs have remained stable.  We discussed the importance of maintaining good hydration, puréed foods.  We will place information for the patient to follow-up with dental specialist that she believes he may benefit from having dental extraction which I do feel is reasonable.    I had a discussion with the patient/family regarding diagnosis, diagnostic results, treatment plan, and medications.  The patient/family indicated understanding of these instructions.  I spent adequate time at the bedside preceding discharge necessary to personally discuss the aftercare instructions, giving patient education, providing explanations of the results of our evaluations/findings, and my decision making to assure that the patient/family understand the plan of care.  Time was allotted to answer questions at that time and throughout the ED course.  Emphasis was placed on timely follow-up after discharge.  I also discussed the potential for the development of an acute emergent  condition requiring further evaluation, admission, or even surgical intervention. I discussed that we found nothing during the visit today indicating the need for further workup, admission, or the presence of an unstable medical condition.  I encouraged the patient to return to the emergency department immediately for ANY concerns, worsening, new complaints, or if symptoms persist and unable to seek follow-up in a timely fashion.  The patient/family expressed understanding and agreement with this plan.  The patient will follow-up with their PCP in 1-2 days for reevaluation.     PROCEDURES:  Procedures    CRITICAL CARE TIME    Total Critical Care time was 0 minutes, excluding separately reportable procedures.   There was a high probability of clinically significant/life threatening deterioration in the patient's condition which required my urgent intervention.      FINAL IMPRESSION      1. Contusion of face, initial encounter    2. Dental injury, initial encounter    3. Hyperkalemia    4. Closed head injury, initial encounter    5. Epistaxis          DISPOSITION/PLAN     ED Disposition       ED Disposition   Discharge    Condition   Stable    Comment   --               PATIENT REFERRED TO:  Alberto Dye MD  [address] [facility] [address]  [place] [address]  [phone]    In 2 days      [facility] EMERGENCY DEPARTMENT  [address] [address] [phone]    If symptoms worsen    [facility]  [address]  [address], [address]  [place] [address]  Phone  Call [phone]  Schedule an appointment as soon as possible for a visit         DISCHARGE MEDICATIONS:     Medication List        CHANGE how you take these medications      carvedilol 12.5 MG tablet  Commonly known as: COREG  Patient takes Coreg 12.5mg daily. Hold until follow up with PCP.  What changed:   how much to take  how to take this  when to take this  additional instructions      cloNIDine 0.2 MG tablet  Commonly known as: CATAPRES  Patient takes 0.2mg q8hrs. Hold until follow up with PCP.  What changed:   how much to take  how to take this  when to take this  reasons to take this  additional instructions     hydrALAZINE 100 MG tablet  Commonly known as: APRESOLINE  Patient takes hydralazine 100mg q8hrs. Hold until follow up with PCP.  What changed:   how much to take  how to take this  when to take this  additional instructions     polyethylene glycol 17 g packet  Commonly known as: MIRALAX  Take 17 g by mouth Daily.  What changed:   how to take this  additional instructions            CONTINUE taking these medications      acetaminophen 160 MG/5ML solution  Commonly known as: TYLENOL  Administer 20.3 mL per G tube Every 6 (Six) Hours As Needed for Mild Pain or Fever.     amLODIPine 10 MG tablet  Commonly known as: NORVASC  Administer 1 tablet per G tube Daily.     brimonidine 0.2 % ophthalmic solution  Commonly known as: ALPHAGAN  Administer 1 drop to both eyes 3 (Three) Times a Day.     cholecalciferol 25 MCG (1000 UT) tablet  Commonly known as: VITAMIN D3     donepezil 10 MG tablet  Commonly known as: Aricept  Take 1 tablet by mouth Every Night.     doxazosin 2 MG tablet  Commonly known as: CARDURA     ferrous sulfate 325 (65 Fe) MG tablet     FLUoxetine 20 MG/5ML solution  Commonly known as: PROzac  Take 5 mL by mouth Daily.     lansoprazole 30 MG Tablet Delayed Release Dispersible disintegrating tablet  Commonly known as: PREVACID SOLUTAB  1 tablet by Nasogastric route Every Morning.     metFORMIN 500 MG tablet  Commonly known as: GLUCOPHAGE     sennosides 8.8 MG/5ML syrup  Commonly known as: SENOKOT  Administer 10 mL per G tube Every Night.     timolol 0.5 % ophthalmic solution  Commonly known as: TIMOPTIC  Administer 1 drop to both eyes 2 (Two) Times a Day.     Valproic Acid 250 MG/5ML solution syrup  Commonly known as: DEPAKENE  3 mL by Per PEG Tube route Every 12 (Twelve)  Hours.                  Comment: Please note this report has been produced using speech recognition software.      Kings Gardner DO  Attending Emergency Physician         Kings Gardner,   02/16/25 [id number]

## 2025-03-29 PROBLEM — J96.01 ACUTE RESPIRATORY FAILURE WITH HYPOXIA: Status: ACTIVE | Noted: 2025-01-01

## 2025-03-29 NOTE — Clinical Note
Level of Care: Critical Care [6]   Diagnosis: Acute respiratory failure with hypoxia [630629]   Certification: I Certify That Inpatient Hospital Services Are Medically Necessary For Greater Than 2 Midnights

## 2025-03-29 NOTE — H&P
INTENSIVIST   PROGRESS NOTE          SUBJECTIVE     Omkar 69 y.o. male is followed for:Shortness of Breath       Acute respiratory failure with hypoxia    As an Intensivist, we provide an integrated approach to the ICU patient and family, medical management of comorbid conditions, including but not limited to electrolytes, glycemic control, organ dysfunction, lead interdisciplinary rounds and coordinate the care with all other services, including those from other specialists.     HPI:  Omkar is a 69 y.o. male with PMH vascular dementia, dysphagia status post PEG tube who presented to this Hospital from long-term care facility on 3/29/2025 because of shortness of breath.  In the ED, labs significant for hemoglobin 6.6, troponin 98, potassium 6.4, creatinine 1.5, sodium 120.  Family member noted patient has been having blood in stool per nursing facility.  Chest x-ray reviewed, bilateral opacities.  Patient placed on BiPAP for hypoxia and work of breathing.  Given hyperkalemia protocol with insulin/dextrose/Lasix/calcium/albuterol/bicarb and started on Zosyn.  ICU consulted for further care.      Patient seen at bedside. Currently on bipap 12/6, 40% with good TV. Lethargic but awakens to voice. Required NT suctioning by RT. No sign of GI bleeding currently.      PMH: He  has a past medical history of Anemia, Dementia, Diabetes mellitus, Dysphagia, GERD (gastroesophageal reflux disease), History of alcohol abuse, History of cocaine use, History of marijuana use, Hypertension, Osteomyelitis, Poor historian, and Visual impairment.   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     Medications:  No current facility-administered medications on file prior to encounter.     Current Outpatient Medications on File Prior to Encounter    Medication Sig   • acetaminophen (TYLENOL) 160 MG/5ML solution Administer 20.3 mL per G tube Every 6 (Six) Hours As Needed for Mild Pain or Fever.   • amLODIPine (NORVASC) 10 MG tablet Administer 1 tablet per G tube Daily.   • brimonidine (ALPHAGAN) 0.2 % ophthalmic solution Administer 1 drop to both eyes 3 (Three) Times a Day.   • carvedilol (COREG) 12.5 MG tablet Patient takes Coreg 12.5mg daily. Hold until follow up with PCP. (Patient taking differently: Administer 1 tablet per G tube Daily.)   • cholecalciferol (VITAMIN D3) 25 MCG (1000 UT) tablet Administer 2 tablets per G tube Daily.   • cloNIDine (CATAPRES) 0.2 MG tablet Patient takes 0.2mg q8hrs. Hold until follow up with PCP. (Patient taking differently: Administer 1 tablet per G tube Every 8 (Eight) Hours As Needed for High Blood Pressure. Give for SBP>180)   • donepezil (Aricept) 10 MG tablet Take 1 tablet by mouth Every Night.   • doxazosin (CARDURA) 2 MG tablet Take 1 tablet by mouth Every Night.   • ferrous sulfate 325 (65 Fe) MG tablet Administer 1 tablet per G tube Daily With Breakfast.   • FLUoxetine (PROzac) 20 MG/5ML solution Take 5 mL by mouth Daily.   • hydrALAZINE (APRESOLINE) 100 MG tablet Patient takes hydralazine 100mg q8hrs. Hold until follow up with PCP. (Patient taking differently: Administer 1 tablet per G tube 3 times a day. Hold for SBP less than 110)   • lansoprazole (PREVACID SOLUTAB) 30 MG Tablet Delayed Release Dispersible disintegrating tablet 1 tablet by Nasogastric route Every Morning.   • metFORMIN (GLUCOPHAGE) 500 MG tablet Administer 1 tablet per G tube 2 (Two) Times a Day With Meals.   • polyethylene glycol (MIRALAX) 17 g packet Take 17 g by mouth Daily. (Patient taking differently: 17 g Daily. G-tube)   • sennosides (SENOKOT) 8.8 MG/5ML syrup Administer 10 mL per G tube Every Night.   • timolol (TIMOPTIC) 0.5 % ophthalmic solution Administer 1 drop to both eyes 2 (Two) Times a Day.   • Valproic Acid (DEPAKENE) 250 MG/5ML  "solution syrup 3 mL by Per PEG Tube route Every 12 (Twelve) Hours.        Allergies: He has no known allergies.   FH: His family history includes Diabetes in his brother, brother, father, maternal grandfather, maternal grandmother, mother, and sister; Hypertension in his brother, brother, father, and mother.   SH: He  reports that he quit smoking about 7 years ago. His smoking use included cigarettes. He started smoking about 47 years ago. He has a 40 pack-year smoking history. He has been exposed to tobacco smoke. He has never used smokeless tobacco. He reports that he does not currently use alcohol. He reports current drug use. Drugs: Marijuana and \"Crack\" cocaine.     The patient's relevant past medical, surgical and social history were reviewed and updated in Epic as appropriate.                 Review of Systems  As described in the HPI.       OBJECTIVE     Vitals:  Temp: 98 °F (36.7 °C) (25) Temp  Min: 98 °F (36.7 °C)  Max: 98 °F (36.7 °C)   Temp core:      BP: 171/66 (25) BP  Min: 140/46  Max: 171/66   MAP (non-invasive) Noninvasive MAP (mmHg): 92 (25) Noninvasive MAP (mmHg)  Av.6  Min: 71  Max: 100   Pulse: 69 (25) Pulse  Min: 64  Max: 73   Resp: 20 (25) Resp  Min: 20  Max: 20   SpO2: 98 % (25) SpO2  Min: 87 %  Max: 98 %   Device: nasal cannula (25)    Flow Rate: 6 (25) Flow (L/min) (Oxygen Therapy)  Min: 2  Max: 6         25   Weight: 80 kg (176 lb 5.9 oz)        Intake/Ouptut 24 hrs (7:00AM - 6:59 AM)  Intake & Output (last 2 days)          07 0700  07 07    IV Piggyback  50    Total Intake(mL/kg)  50 (0.6)    Net  +50                  Medications (drips):  Pharmacy to dose vancomycin           Physical Examination  Telemetry:  Rhythm: normal sinus rhythm, conduction defect (25 0528)         Constitutional:  No acute distress.   Cardiovascular: RRR.    Respiratory: " Rhonchi throughout, equal breath sounds, no distress    Abdominal:  Soft with no tenderness. PEG tube in place c/d/I    Extremities: No Edema   Neurological:   Lethargic, awakens to voice.   Best Eye Response: 4-->(E4) spontaneous (03/29/25 1731)  Best Motor Response: 6-->(M6) obeys commands (03/29/25 1731)  Best Verbal Response: 4-->(V4) confused (03/29/25 1731)  [place] Coma Scale Score: 14 (03/29/25 1731)          Lines, Drains & Airways       Active LDAs       Name Placement date Placement time Site Days    Peripheral IV 03/29/25 1647 Distal;Left;Posterior Forearm 03/29/25  1647  Forearm  less than 1    Gastrostomy/Enterostomy Percutaneous endoscopic gastrostomy (PEG) 1 20 Fr. LUQ 04/01/24  1453  LUQ  362    Urethral Catheter 16 Fr. 11/22/24  1927  -- 126                    Results Reviewed:  Laboratory  Microbiology  Radiology  Pathology    Hematology:  Results from last 7 days   Lab Units 03/29/25  1708   WBC 10*3/mm3 6.25   HEMOGLOBIN g/dL 6.6*   MCV fL 87.6   PLATELETS 10*3/mm3 254     Results from last 7 days   Lab Units 03/29/25  1708   NEUTROS ABS 10*3/mm3 4.68   LYMPHS ABS 10*3/mm3 0.59*   EOS ABS 10*3/mm3 0.02     Chemistry:  Estimated Creatinine Clearance: 52.7 mL/min (A) (by C-G formula based on SCr of 1.54 mg/dL (H)).    Results from last 7 days   Lab Units 03/29/25  1708   SODIUM mmol/L 120*   POTASSIUM mmol/L 6.4*   CHLORIDE mmol/L 91*   CO2 mmol/L 16.0*   BUN mg/dL 46*   CREATININE mg/dL 1.54*   GLUCOSE mg/dL 65     Results from last 7 days   Lab Units 03/29/25  1708   CALCIUM mg/dL 8.9       Hepatic Panel:  Results from last 7 days   Lab Units 03/29/25  1708   ALBUMIN g/dL 3.8   TOTAL PROTEIN g/dL 7.5   BILIRUBIN mg/dL 0.2   AST (SGOT) U/L 30   ALT (SGPT) U/L 29   ALK PHOS U/L 102        Coagulation Labs:         Cardiac Labs:  Results from last 7 days   Lab Units 03/29/25  1828 03/29/25  1708   PROBNP pg/mL  --  3,216.0*   HSTROP T ng/L 100* 98*       Biomarkers:  Results from last 7 days   Lab  Units 03/29/25  1828 03/29/25  1708   LACTATE mmol/L  --  0.7   PROCALCITONIN ng/mL 0.39*  --        U/A  Results from last 7 days   Lab Units 03/29/25 1944   COLOR UA  Yellow   CLARITY UA  Cloudy*   PH, URINE  5.5   SPECIFIC GRAVITY, URINE  1.008   GLUCOSE UA  Negative   KETONES UA  Negative   BILIRUBIN UA  Negative   PROTEIN UA  Negative   BLOOD UA  Small (1+)*   LEUKOCYTES UA  Large (3+)*   NITRITE UA  Negative   UROBILINOGEN UA  0.2 E.U./dL       Results from last 7 days   Lab Units 03/29/25 1944   RBC UA /HPF 0-2   WBC UA /HPF Too Numerous to Count*   BACTERIA UA /HPF 4+*   SQUAM EPITHEL UA /HPF None Seen   HYALINE CASTS UA /LPF None Seen       COVID-19  Lab Results   Component Value Date    COVID19 Not Detected 03/29/2025    COVID19 Not Detected 11/07/2024    COVID19 Not Detected 10/22/2024    COVID19 Detected (C) 09/11/2024       Arterial Blood Gases:  Results from last 7 days   Lab Units 03/29/25  1849   FIO2 % 21       Images:  XR Chest 1 View  Result Date: 3/29/2025  Impression: 1.Hazy bilateral airspace opacities, which could reflect pulmonary edema or pneumonia. 2.Small right pleural effusion. Electronically Signed: Omkar Umana MD  3/29/2025 5:19 PM EDT  Workstation ID: [id number]      Echo:  Results for orders placed during the hospital encounter of 11/07/24    Adult Transthoracic Echo Limited W/ Cont if Necessary Per Protocol    Interpretation Summary  •  Left ventricular ejection fraction appears to be 56 - 60%.  •  Left ventricular wall thickness is consistent with concentric hypertrophy.  moderate.  Strain is normal and in normal pattern.  GLS -24%  •  Left ventricular diastolic dysfunction is noted.  •  There is a trivial pericardial effusion.      Results: Reviewed.  I reviewed the patient's new laboratory and imaging results.  I independently reviewed the patient's new images.    Medications: Reviewed.    Assessment    A/P     Hospital:  LOS: 0 days   ICU: 34m     Active Hospital Problems     Diagnosis  POA   • **Acute respiratory failure with hypoxia [J96.01]  Yes     Omkar is a 69 y.o. male admitted on 3/29/2025 with Acute respiratory failure with hypoxia [J96.01]    Assessment/Management/Treatment Plan:    //Acute hypoxic respiratory failure   //JAYY with hyperkalemia   //GI bleed with associated anemia  //UTI  //Volume overload  //PNA, suspected   //PMH vascular dementia c/b psychosis, dysphagia status post PEG tube, chronic anemia, chronic aspiration, type 2 diabetes, HFpEF, wheelchair bound & resides in nursing facility    Pulmonary   Currently on BiPAP 12/6, 40%.   CXR reviewed with airspace opacities, vascular congestion vs PNA.   Cardiovascular  Lactic acid 0.7.  MAP >65 without vasopressors.   Troponin 98, repeat 100.  EKG reviewed w/o ST changes. TTE 11/2024 EF 56 to 60% with diastolic dysfunction noted, no significant valvular abnormalities.  Neuro  Protecting airway currently, but monitor mentation closely. VBG without hypercapnia.   Continue home meds   GI/Hepatology  PPI BID. NPO. Monitor for GI bleeding. Consult GI.   Renal  K 6.4 s/p treatment including 40 lasix . Will add lokelma. Cr 1.54 (bl 1.2), bicarb 16 w/o acidosis on gas. Repeat BMP this evening to ensure improvement. Of note, patient has had multiple occurences of hyperK with mild JAYY in the past.   ID/Antibiotics  Continue zosyn and vanc given history of MRSA. COVID/flu/RSV negative.  Follow-up blood cultures, urine culture, MRSA nares, legionella, strep PNA.   Hematology  Hgb 6.6, transfuse 1u pRBC.  Trend H&H every 6.  Plts wnl. SCDs for prophy. Chemical contraindicated.   Endocrine  Body mass index is 24.15 kg/m². Normal: 18.5-24.9kg/m2  Type 2 diabetes.. Accuchecks q6hr with sliding scale insulin.  Hold home metformin.    Lab Results   Lab Value Date/Time    HGBA1C 5.60 03/29/2025 1708    HGBA1C 5.60 09/15/2024 0658    HGBA1C 8.7 (H) 06/25/2022 0242    HGBA1C 13.4 04/14/2022 1058     Results from last 7 days   Lab Units  03/29/25 2118 03/29/25 2041 03/29/25 2038 03/29/25 1949   GLUCOSE mg/dL 124 54* 54* 77       Diet: NPO Diet NPO Type: Strict NPO  No active supplement orders      Advance Directives: Code Status and Medical Interventions: CPR (Attempt to Resuscitate); Full Support   Ordered at: 03/29/25 1926     Code Status (Patient has no pulse and is not breathing):    CPR (Attempt to Resuscitate)     Medical Interventions (Patient has pulse or is breathing):    Full Support        VTE Prophylaxis:  Mechanical VTE prophylaxis orders are present.           Disposition: Admit to ICU    Plan of care and goals reviewed during interdisciplinary rounds.  I discussed the patient's findings and my recommendations with patient, family, and nursing staff    Time: 38 minutes of critical care provided. This time excludes other billable procedures. Time does include preparation of documents, medical consultations, review of old records, and direct bedside care. Patient is at high risk for life-threatening deterioration due to Respiratory Failure.    He has a high risk of imminent or life-threatening  deterioration, which requires the highest level of physician preparedness to intervene urgently. I devoted my full attention to the direct care of this patient for the amount of time indicated above.     Time spent with family or surrogate(s) is included only if the patient was incapable of providing the necessary information or participating in medical decision making.        Marimar Ramirez MD  Pulmonary Critical Care Medicine

## 2025-03-29 NOTE — ED PROVIDER NOTES
Subjective   History of Present Illness  Mr. Nava brought by EMS from [facility] with difficulty breathing.  He is not able to provide any history.  They report that he typically does not wear oxygen and was found to have saturations of 69% on room air.  He has history of dementia.  Is noted to have feeding tube in place on exam.  At the time of my exam his saturations are 93% on 4 L nasal cannula oxygen.      Review of Systems    Past Medical History:   Diagnosis Date    Anemia     Dementia     Diabetes mellitus     Dysphagia     GERD (gastroesophageal reflux disease)     History of alcohol abuse     History of cocaine use     History of marijuana use     Hypertension     Osteomyelitis     Poor historian     records obtained from nursing home records & his family    Visual impairment        No Known Allergies    Past Surgical History:   Procedure Laterality Date    AMPUTATION FOOT Left 10/18/2022    Procedure: PARTIAL FIRST RAY AMPUTATION LEFT;  Surgeon: Yeison Petty MD;  Location:  [facility];  Service: Orthopedics;  Laterality: Left;    AMPUTATION FOOT Left 12/5/2022    Procedure: Transmetatarsal of Left Foot;  Surgeon: Yeison Petty MD;  Location:  [facility];  Service: Orthopedics;  Laterality: Left;    ENDOSCOPY N/A 3/14/2024    Procedure: ESOPHAGOGASTRODUODENOSCOPY WITH JEJUNAL TUBE INSERTION AT BEDSIDE;  Surgeon: Brunner, Mark I, MD;  Location:  [facility];  Service: Gastroenterology;  Laterality: N/A;    ENDOSCOPY W/ PEG TUBE PLACEMENT N/A 4/1/2024    Procedure: ESOPHAGOGASTRODUODENOSCOPY WITH PERCUTANEOUS ENDOSCOPIC GASTROSTOMY TUBE INSERTION;  Surgeon: Brunner, Mark I, MD;  Location:  [facility];  Service: Gastroenterology;  Laterality: N/A;  EGD with PEG placement.  Secured at 3.5 cm    EYE SURGERY         Family History   Problem Relation Age of Onset    Diabetes Mother     Hypertension Mother     Hypertension Father     Diabetes Father     Diabetes Sister     Hypertension Brother      "Diabetes Brother     Diabetes Maternal Grandmother     Diabetes Maternal Grandfather     Hypertension Brother     Diabetes Brother        Social History     Socioeconomic History    Marital status: Single   Tobacco Use    Smoking status: Former     Current packs/day: 0.00     Average packs/day: 1 pack/day for 40.0 years (40.0 ttl pk-yrs)     Types: Cigarettes     Start date: 1977     Quit date: 2017     Years since quittin.8     Passive exposure: Past    Smokeless tobacco: Never   Vaping Use    Vaping status: Never Used   Substance and Sexual Activity    Alcohol use: Not Currently     Comment: histgry of alcohol abuse    Drug use: Yes     Types: Marijuana, \"Crack\" cocaine     Comment: PATIENT STATES \"IT'S BEEN A FEW DAYS\"    Sexual activity: Defer           Objective   Physical Exam  Vitals and nursing note reviewed.   Constitutional:       General: He is in acute distress.      Appearance: Normal appearance.      Comments: He is reclining in bed, eyes are rolled back.  He occasionally will say hello when I speak to him but does not attempt to answer any   HENT:      Head: Normocephalic and atraumatic.      Nose: Nose normal. No congestion or rhinorrhea.   Eyes:      General: No scleral icterus.     Conjunctiva/sclera: Conjunctivae normal.   Neck:      Comments: No JVD   Cardiovascular:      Rate and Rhythm: Normal rate and regular rhythm.      Heart sounds: No murmur heard.     No friction rub.   Pulmonary:      Effort: Tachypnea and accessory muscle usage present.      Breath sounds: Rhonchi present. No wheezing or rales.      Comments: Mild rhonchi on exam  Abdominal:      Palpations: Abdomen is soft.      Tenderness: There is no abdominal tenderness. There is no guarding or rebound.      Comments: Has feeding tube in left upper   Musculoskeletal:         General: No tenderness.      Cervical back: Normal range of motion and neck supple.      Right lower leg: No edema.      Left lower leg: No edema. "   Skin:     General: Skin is warm and dry.      Coloration: Skin is not pale.      Findings: No erythema.   Neurological:      Coordination: Coordination normal.      Comments: Neuroexam seems grossly intact although he is not able to participate.  He is able to squeeze fingers and seems symmetric   Psychiatric:         Thought Content: Thought content normal.         Critical Care    Performed by: Lizandro Miranda MD  Authorized by: Marimar Ramirez MD    Critical care provider statement:     Critical care time (minutes):  45    Critical care was necessary to treat or prevent imminent or life-threatening deterioration of the following conditions:  CNS failure or compromise, respiratory failure and sepsis    Critical care was time spent personally by me on the following activities:  Discussions with consultants, evaluation of patient's response to treatment, obtaining history from patient or surrogate, ordering and performing treatments and interventions, ordering and review of laboratory studies, ordering and review of radiographic studies, pulse oximetry, re-evaluation of patient's condition and review of old charts  Comments:      Multiple reevaluations.  Initiated BiPAP therapy.  Gave multiple IV medications.  Ordered and interpreted multiple labs and ABG.  Obtained history from family.  Consulted intensivist and admitted him to the ICU             ED Course  ED Course as of 03/30/25 0033   Sat Mar 29, 2025   1731 Chest x-ray shows bilateral infiltrates.  Differential includes pulmonary edema versus pneumonia.  Certainly at risk for aspiration.  Hemoglobin 6.6 with history of DHF, at risk of pulmonary edema as well.  Will give Lasix and antibiotics [DT]   1732 Ordered transfusion of 2 units of blood.  Awaiting remainder of labs. [DT]   1803 Ordered hyperkalemia medications. [DT]   1858 Daughter is present.  I updated her on findings.  She confirms she is full code.  Currently he is getting nebulized albuterol, has  copious rhonchi.  Saturations 90% while getting a neb.  Have asked respiratory therapy to start BiPAP. [DT]   1907 Paged Dr. Ramirez who is on-call for the ICU. [DT]   1933 Spoke with Dr. Ramirez who will admit him to the ICU [DT]      ED Course User Index  [DT] Lizandro Miranda MD                                                       Medical Decision Making  Problems Addressed:  Acute respiratory failure with hypoxia: complicated acute illness or injury that poses a threat to life or bodily functions  Gastrointestinal hemorrhage, unspecified gastrointestinal hemorrhage type: acute illness or injury that poses a threat to life or bodily functions  Hyperkalemia: complicated acute illness or injury that poses a threat to life or bodily functions  Hyponatremia: complicated acute illness or injury  Pneumonia of both lower lobes due to infectious organism: complicated acute illness or injury that poses a threat to life or bodily functions    Amount and/or Complexity of Data Reviewed  Independent Historian: EMS  External Data Reviewed: notes.  Labs: ordered. Decision-making details documented in ED Course.  Radiology: ordered. Decision-making details documented in ED Course.  ECG/medicine tests: ordered.    Risk  OTC drugs.  Prescription drug management.  Decision regarding hospitalization.    Critical Care  Total time providing critical care: 45 minutes        Final diagnoses:   Hyponatremia   Acute respiratory failure with hypoxia   Pneumonia of both lower lobes due to infectious organism   Hyperkalemia   Gastrointestinal hemorrhage, unspecified gastrointestinal hemorrhage type       ED Disposition  ED Disposition       ED Disposition   Decision to Admit    Condition   --    Comment   Level of Care: Critical Care [6]   Admitting Physician: JORGE RAMIREZ [[id number]]                 No follow-up provider specified.       Medication List      No changes were made to your prescriptions during this visit.            Lizandro Miranda,  MD  03/30/25 0033

## 2025-03-30 NOTE — PROCEDURES
"Intubation    Date/Time: 3/29/2025 10:38 PM    Performed by: John Knight APRN  Authorized by: John Knight APRN  Consent: Verbal consent obtained  Risks and benefits: risks, benefits and alternatives were discussed  Consent given by: daughter.  Required items: required blood products, implants, devices, and special equipment available  Patient identity confirmed: arm band, provided demographic data, hospital-assigned identification number and anonymous protocol, patient vented/unresponsive  Time out: Immediately prior to procedure a \"time out\" was called to verify the correct patient, procedure, equipment, support staff and site/side marked as required.  Indications: respiratory failure  Patient status: paralyzed (RSI)  Preoxygenation: BVM & NIPPV.  Sedatives: etomidate and fentanyl  Paralytic: rocuronium  Tube size: 7.5 mm  Tube type: cuffed  Number of attempts: 2  Ventilation between attempts: BVM  Cricoid pressure: no  Cords visualized: yes  Post-procedure assessment: chest rise and ETCO2 monitor  Breath sounds: equal and absent over the epigastrium  Cuff inflated: yes  ETT to lip: 26 cm  Tube secured with: ETT ramirez  Chest x-ray interpreted by me.  Chest x-ray findings: endotracheal tube in appropriate position  Patient tolerance: patient tolerated the procedure well with no immediate complications        "

## 2025-03-30 NOTE — CONSULTS
[facility]:  Inpatient Gastroenterology Consult      Inpatient Gastroenterology Consult  Consult performed by: Blue Araujo APRN  Consult ordered by: John Knight APRN  Reason for consult: GI bleed      Referring Provider: No ref. provider found    PCP: Alberto Dye MD    Chief Complaint: Weakness    History of present illness:    Omkar Nava is a 69 y.o. male who is admitted with anemia and shortness of breath.  GI consult received for worsening anemia and heme positive stools.  Patient is intubated and sedated at time of exam; history obtained from family bedside-of note, history somewhat limited due to patient's background history of dementia.    Patient presented to ER last night with significant dyspnea and decline.  Patient's family notes that there have been concerns of blood in his stool for the past few days.  There have been no complaints of abdominal pain, nausea, vomiting, diarrhea, chest pain, or fever/chills.  No known ill contacts.  No use of NSAIDs.  No prior history of GI bleed.  Has existing PEG tube for dysphagia; daughter reports he is now eating by mouth and inquires on if tube can be removed.    Past medical, surgical, social, and family histories are reviewed for accuracy.  No documented alleviating or exacerbating factors.  Does not endorse pain at time of exam.    Allergies:  Patient has no known allergies.    Scheduled Meds:  [Held by provider] donepezil, 10 mg, Per PEG Tube, Nightly  [Held by provider] FLUoxetine, 20 mg, Per PEG Tube, Daily  insulin regular, 2-7 Units, Subcutaneous, Q6H  [START ON 4/27/2025] mupirocin, 1 Application, Each Nare, BID  pantoprazole, 40 mg, Intravenous, Q12H  piperacillin-tazobactam, 3.375 g, Intravenous, Q8H  senna-docusate sodium, 2 tablet, Per PEG Tube, BID  sodium chloride, 10 mL, Intravenous, Q12H  sodium zirconium cyclosilicate, 10 g, Per PEG Tube, Q8H  Valproic Acid, 150 mg, Per PEG Tube, Q12H  vancomycin,  1,500 mg, Intravenous, Q24H         Infusions:  fentanyl 10 mcg/mL,  mcg/hr, Last Rate: 150 mcg/hr (03/30/25 8669)  Pharmacy to dose vancomycin,   propofol, 5-50 mcg/kg/min, Last Rate: 15 mcg/kg/min (03/30/25 5487)        PRN Meds:    senna-docusate sodium **AND** polyethylene glycol **AND** bisacodyl **AND** bisacodyl    dextrose    dextrose    fentaNYL    glucagon (human recombinant)    nitroglycerin    Pharmacy to dose vancomycin    sodium chloride    sodium chloride    sodium chloride    Home Meds:  Medications Prior to Admission   Medication Sig Dispense Refill Last Dose/Taking    acetaminophen (TYLENOL) 160 MG/5ML solution Administer 20.3 mL per G tube Every 6 (Six) Hours As Needed for Mild Pain or Fever.       amLODIPine (NORVASC) 10 MG tablet Administer 1 tablet per G tube Daily.       brimonidine (ALPHAGAN) 0.2 % ophthalmic solution Administer 1 drop to both eyes 3 (Three) Times a Day.       carvedilol (COREG) 12.5 MG tablet Patient takes Coreg 12.5mg daily. Hold until follow up with PCP. (Patient taking differently: Administer 1 tablet per G tube Daily.)       cholecalciferol (VITAMIN D3) 25 MCG (1000 UT) tablet Administer 2 tablets per G tube Daily.       cloNIDine (CATAPRES) 0.2 MG tablet Patient takes 0.2mg q8hrs. Hold until follow up with PCP. (Patient taking differently: Administer 1 tablet per G tube Every 8 (Eight) Hours As Needed for High Blood Pressure. Give for SBP>180)       donepezil (Aricept) 10 MG tablet Take 1 tablet by mouth Every Night. 30 tablet 11     doxazosin (CARDURA) 2 MG tablet Take 1 tablet by mouth Every Night.       ferrous sulfate 325 (65 Fe) MG tablet Administer 1 tablet per G tube Daily With Breakfast.       FLUoxetine (PROzac) 20 MG/5ML solution Take 5 mL by mouth Daily.       hydrALAZINE (APRESOLINE) 100 MG tablet Patient takes hydralazine 100mg q8hrs. Hold until follow up with PCP. (Patient taking differently: Administer 1 tablet per G tube 3 times a day. Hold for SBP  less than 110)       lansoprazole (PREVACID SOLUTAB) 30 MG Tablet Delayed Release Dispersible disintegrating tablet 1 tablet by Nasogastric route Every Morning.       metFORMIN (GLUCOPHAGE) 500 MG tablet Administer 1 tablet per G tube 2 (Two) Times a Day With Meals.       polyethylene glycol (MIRALAX) 17 g packet Take 17 g by mouth Daily. (Patient taking differently: 17 g Daily. G-tube) 30 each 0     sennosides (SENOKOT) 8.8 MG/5ML syrup Administer 10 mL per G tube Every Night.       timolol (TIMOPTIC) 0.5 % ophthalmic solution Administer 1 drop to both eyes 2 (Two) Times a Day.       Valproic Acid (DEPAKENE) 250 MG/5ML solution syrup 3 mL by Per PEG Tube route Every 12 (Twelve) Hours.          ROS: Review of Systems   Unable to perform ROS: Intubated       PAST MED HX:  Past Medical History:   Diagnosis Date    Anemia     Dementia     Diabetes mellitus     Dysphagia     GERD (gastroesophageal reflux disease)     History of alcohol abuse     History of cocaine use     History of marijuana use     Hypertension     Osteomyelitis     Poor historian     records obtained from nursing home records & his family    Visual impairment        PAST SURG HX:  Past Surgical History:   Procedure Laterality Date    AMPUTATION FOOT Left 10/18/2022    Procedure: PARTIAL FIRST RAY AMPUTATION LEFT;  Surgeon: Yeison Petty MD;  Location:  [facility];  Service: Orthopedics;  Laterality: Left;    AMPUTATION FOOT Left 12/5/2022    Procedure: Transmetatarsal of Left Foot;  Surgeon: Yeison Petty MD;  Location:  [facility];  Service: Orthopedics;  Laterality: Left;    ENDOSCOPY N/A 3/14/2024    Procedure: ESOPHAGOGASTRODUODENOSCOPY WITH JEJUNAL TUBE INSERTION AT BEDSIDE;  Surgeon: Brunner, Mark I, MD;  Location:  [facility];  Service: Gastroenterology;  Laterality: N/A;    ENDOSCOPY W/ PEG TUBE PLACEMENT N/A 4/1/2024    Procedure: ESOPHAGOGASTRODUODENOSCOPY WITH PERCUTANEOUS ENDOSCOPIC GASTROSTOMY TUBE INSERTION;  Surgeon: Brunner, Mark  "MD DANIELA;  Location: [facility];  Service: Gastroenterology;  Laterality: N/A;  EGD with PEG placement.  Secured at 3.5 cm    EYE SURGERY         FAM HX:  Family History   Problem Relation Age of Onset    Diabetes Mother     Hypertension Mother     Hypertension Father     Diabetes Father     Diabetes Sister     Hypertension Brother     Diabetes Brother     Diabetes Maternal Grandmother     Diabetes Maternal Grandfather     Hypertension Brother     Diabetes Brother        SOC HX:  Social History     Socioeconomic History    Marital status: Single   Tobacco Use    Smoking status: Former     Current packs/day: 0.00     Average packs/day: 1 pack/day for 40.0 years (40.0 ttl pk-yrs)     Types: Cigarettes     Start date: 1977     Quit date: 2017     Years since quittin.8     Passive exposure: Past    Smokeless tobacco: Never   Vaping Use    Vaping status: Never Used   Substance and Sexual Activity    Alcohol use: Not Currently     Comment: histgry of alcohol abuse    Drug use: Not Currently     Types: Marijuana, \"Crack\" cocaine     Comment: PATIENT STATES \"IT'S BEEN A FEW DAYS\"    Sexual activity: Defer       PHYSICAL EXAM  /49   Pulse 63   Temp 100.3 °F (37.9 °C) (Axillary)   Resp 20   Ht 182 cm (71.65\")   Wt 87 kg (191 lb 12.8 oz)   SpO2 98%   BMI 26.26 kg/m²   Wt Readings from Last 3 Encounters:   25 87 kg (191 lb 12.8 oz)   25 80.3 kg (177 lb)   24 80.3 kg (177 lb)   ,body mass index is 26.26 kg/m².  Physical Exam  Vitals and nursing note reviewed.   Constitutional:       General: He is not in acute distress.     Appearance: Normal appearance. He is normal weight. He is not ill-appearing or toxic-appearing.   HENT:      Head: Normocephalic and atraumatic.   Cardiovascular:      Rate and Rhythm: Normal rate and regular rhythm.      Pulses: Normal pulses.      Heart sounds: Normal heart sounds.   Pulmonary:      Comments: Intubated and sedated on mechanical ventilator.  " Equal bilateral chest expansion appreciated.  No adventitious breath sounds noted to auscultation.  Abdominal:      General: Abdomen is flat. Bowel sounds are normal. There is no distension.      Palpations: Abdomen is soft. There is no mass.      Tenderness: There is no abdominal tenderness. There is no guarding or rebound.      Hernia: No hernia is present.   Genitourinary:     Rectum: Guaiac result positive.   Musculoskeletal:      Right lower leg: No edema.      Left lower leg: No edema.   Skin:     General: Skin is warm and dry.      Capillary Refill: Capillary refill takes less than 2 seconds.      Coloration: Skin is not jaundiced or pale.   Neurological:      Mental Status: He is alert.      Comments: Intubated and sedated   Psychiatric:      Comments: Intubated and sedated         Results Review:   I reviewed the patient's new clinical results.  I reviewed the patient's new imaging results and agree with the interpretation.  I reviewed the patient's other test results and agree with the interpretation  I personally viewed and interpreted the patient's EKG/Telemetry data    Lab Results   Component Value Date    WBC 8.17 03/30/2025    WBC 8.17 03/30/2025    HGB 7.9 (L) 03/30/2025    HGB 7.8 (L) 03/30/2025    HGB 7.8 (L) 03/30/2025    HCT 23.9 (L) 03/30/2025    HCT 24.0 (L) 03/30/2025    HCT 24.0 (L) 03/30/2025    MCV 85.1 03/30/2025    MCV 85.1 03/30/2025     03/30/2025     03/30/2025       Lab Results   Component Value Date    INR 1.07 11/14/2024    INR 1.05 03/29/2024    INR 1.38 (H) 03/12/2024       Lab Results   Component Value Date    GLUCOSE 69 03/30/2025    BUN 41 (H) 03/30/2025    CREATININE 1.58 (H) 03/30/2025    EGFRIFNONA >60 07/25/2022    EGFRIFAFRI >60 07/25/2022    BCR 25.9 (H) 03/30/2025     (L) 03/30/2025    K 5.0 03/30/2025    CO2 19.0 (L) 03/30/2025    CALCIUM 8.9 03/30/2025    ALBUMIN 3.4 (L) 03/30/2025    ALKPHOS 92 03/30/2025    BILITOT 0.9 03/30/2025    BILIDIR <0.2  11/12/2024    ALT 26 03/30/2025    AST 28 03/30/2025       XR Chest 1 View  Result Date: 3/30/2025  XR CHEST 1 VW Date of Exam: 3/30/2025 2:39 AM EDT Indication: Intubated Patient Comparison: 3/29/2025 Findings: Endotracheal tube is in good position. The heart remains enlarged. Bilateral infiltrates are again identified, worsened overall. Pleural effusions may be present as well. No pneumothorax.     1.Interval worsening of bilateral infiltrates. 2.Endotracheal tube in good position. Electronically Signed: Ricky Valverde MD  3/30/2025 5:45 AM EDT  Workstation ID: [id number]    CT Head Without Contrast  Result Date: 3/30/2025  CT HEAD WO CONTRAST Date of Exam: 3/30/2025 12:46 AM EDT Indication: AMS s/p fall. Comparison: CT head 2/16/2025 Technique: Axial CT images were obtained of the head without contrast administration.  Automated exposure control and iterative construction methods were used. Findings: There is mild diffuse generalized atrophy. There are low-attenuation areas in the periventricular white matter consistent with chronic microvascular ischemic change. There is no mass, mass effect or midline shift. There are no abnormal extra-axial fluid collections or areas of acute hemorrhage. There is bilateral ethmoid sinus disease. There is mild bilateral maxillary sinus mucosal thickening. The mastoid air cells are clear. There is an endotracheal tube in the midthoracic trachea. There are dystrophic changes of the right globe.     Impression: Atrophy and chronic microvascular ischemic change. No acute intracranial process. Electronically Signed: Boby Riggs MD  3/30/2025 4:21 AM EDT  Workstation ID: [id number]    XR Chest 1 View  Result Date: 3/29/2025  XR CHEST 1 VW Date of Exam: 3/29/2025 10:38 PM EDT Indication: intubation Comparison: 3/29/2025. Findings: New endotracheal tube tip in the mid trachea. Heart appears enlarged. Diffuse interstitial opacities are present with patchy airspace disease within the  perihilar region and the bilateral lower lobes, progressed as compared to the previous study. Probable small bilateral pleural effusions are present. No pneumothorax.     Impression: New endotracheal tube tip in the mid trachea. Worsening pulmonary edema pattern and bibasilar airspace disease. Stable small bilateral pleural effusions. Electronically Signed: Princess Jeong MD  3/29/2025 11:02 PM EDT  Workstation ID: [id number]    XR Chest 1 View  Result Date: 3/29/2025  XR CHEST 1 VW Date of Exam: 3/29/2025 4:53 PM EDT Indication: SOA triage protocol Comparison: November 12, 2024 Findings: There are hazy bilateral airspace opacities. There is a small right pleural effusion. The heart and mediastinal contours appear stable. The osseous structures appear intact.     Impression: 1.Hazy bilateral airspace opacities, which could reflect pulmonary edema or pneumonia. 2.Small right pleural effusion. Electronically Signed: Omkar Umana MD  3/29/2025 5:19 PM EDT  Workstation ID: [id number]       ASSESSMENTS/PLANS  1.  Gastrointestinal bleeding, blood in stool  2.  Acute on chronic anemia  3.  Acute hypoxic respiratory failure requiring intubation and mechanical ventilation  4.  Dysphagia, PEG tube in place  5.  Vascular dementia.    Omakr Nava is a 69 y.o. male presents to hospital with shortness of breath was found to have acute hypoxic respiratory failure with open required intubation and ventilation.  GI consult received for concerns for gastrointestinal bleeding as they have noted blood in stool and patient appears more anemic from baseline.  As such, recommend EGD today for evaluation of upper GI bleeding source.  Additionally, patient's daughter notes that they have considered removing his PEG tube as he appears to be eating well now; we will plan to remove existing endoscopically removable PEG tube and convert to percutaneously removable G-tube as to avoid any further exposure to anesthesia than necessary.    >>>  N.p.o.  >>> Recommend EGD today with control of bleeding and gastrostomy tube exchange.  >>> IV PPI twice daily  >>> Monitor H&H; transfuse per protocol  >>> Tentatively plan to replace patient's endoscopically removable PEG tube with a 20 English G-tube.     I discussed the patient's findings and my recommendations with family, nursing staff, and consulting provider.    Blue Araujo, NARGIS  03/30/25  10:46 EDT       Statement Selected

## 2025-03-30 NOTE — PLAN OF CARE
Problem: Adult Inpatient Plan of Care  Goal: Plan of Care Review  Outcome: Progressing  Flowsheets (Taken 3/30/2025 1852)  Outcome Evaluation: Pt remains intubated/sedated.  Peep @ 7, Fio2 @ 40, fentanyl @ 200, propofol @ 30.  EGD with peg exchange at bedside today, tolerated well.   mL.  BM x1, no signs of bleeding.  H&H stable, monitoring q6.  1600 assessment, left pupil noted to be oval in shape and slightly larger than the right.  MD aware.  No other neuro changes noted.  Head CT scan completed. Restraints remain in place.  Goal: Patient-Specific Goal (Individualized)  Outcome: Progressing  Goal: Absence of Hospital-Acquired Illness or Injury  Outcome: Progressing  Intervention: Identify and Manage Fall Risk  Recent Flowsheet Documentation  Taken 3/30/2025 1800 by Adry Morales, RN  Safety Promotion/Fall Prevention:   fall prevention program maintained   room organization consistent   safety round/check completed  Taken 3/30/2025 1600 by Adry Morales, RN  Safety Promotion/Fall Prevention:   safety round/check completed   room organization consistent   fall prevention program maintained  Taken 3/30/2025 1400 by Adry Morales, RN  Safety Promotion/Fall Prevention:   activity supervised   clutter free environment maintained   fall prevention program maintained   nonskid shoes/slippers when out of bed   room organization consistent   safety round/check completed  Taken 3/30/2025 1200 by Adry Morales, RN  Safety Promotion/Fall Prevention:   activity supervised   clutter free environment maintained   fall prevention program maintained   nonskid shoes/slippers when out of bed   room organization consistent   safety round/check completed  Taken 3/30/2025 1000 by Adry Morales, RN  Safety Promotion/Fall Prevention:   activity supervised   clutter free environment maintained   fall prevention program maintained   nonskid shoes/slippers when out of bed    room organization consistent   safety round/check completed  Taken 3/30/2025 0800 by Adry Morales RN  Safety Promotion/Fall Prevention:   activity supervised   clutter free environment maintained   fall prevention program maintained   nonskid shoes/slippers when out of bed   room organization consistent   safety round/check completed  Intervention: Prevent Skin Injury  Recent Flowsheet Documentation  Taken 3/30/2025 1800 by Adry Morales RN  Body Position:   turned   left   lower extremity elevated   upper extremity elevated  Skin Protection:   incontinence pads utilized   transparent dressing maintained   silicone foam dressing in place   protective footwear used  Taken 3/30/2025 1600 by Adry Morales RN  Body Position:   turned   lower extremity elevated   neutral head position   neutral body alignment   upper extremity elevated  Skin Protection:   incontinence pads utilized   transparent dressing maintained   silicone foam dressing in place   protective footwear used  Taken 3/30/2025 1400 by Adry Morales RN  Body Position:   turned   right   lower extremity elevated   upper extremity elevated  Skin Protection:   incontinence pads utilized   transparent dressing maintained   silicone foam dressing in place   protective footwear used  Taken 3/30/2025 1200 by Adry Morales RN  Body Position:   turned   lower extremity elevated   neutral body alignment   neutral head position   upper extremity elevated  Skin Protection:   incontinence pads utilized   transparent dressing maintained   silicone foam dressing in place   protective footwear used  Taken 3/30/2025 1000 by Adry Morales RN  Body Position:   turned   left   lower extremity elevated   upper extremity elevated  Skin Protection:   incontinence pads utilized   transparent dressing maintained   silicone foam dressing in place   protective footwear used  Taken 3/30/2025 0800 by Andrew  Adry Scott RN  Body Position:   turned   lower extremity elevated   neutral body alignment   neutral head position   upper extremity elevated  Skin Protection:   incontinence pads utilized   transparent dressing maintained   silicone foam dressing in place   protective footwear used  Intervention: Prevent and Manage VTE (Venous Thromboembolism) Risk  Recent Flowsheet Documentation  Taken 3/30/2025 1200 by Adry Morales RN  VTE Prevention/Management:   bilateral   SCDs (sequential compression devices) on  Taken 3/30/2025 0800 by Adry Morales RN  VTE Prevention/Management:   bilateral   SCDs (sequential compression devices) on  Intervention: Prevent Infection  Recent Flowsheet Documentation  Taken 3/30/2025 1200 by Adry Morales RN  Infection Prevention:   environmental surveillance performed   visitors restricted/screened   single patient room provided   rest/sleep promoted   personal protective equipment utilized  Taken 3/30/2025 1000 by Adry Morales RN  Infection Prevention:   environmental surveillance performed   visitors restricted/screened   single patient room provided   rest/sleep promoted   personal protective equipment utilized  Goal: Optimal Comfort and Wellbeing  Outcome: Progressing  Intervention: Monitor Pain and Promote Comfort  Recent Flowsheet Documentation  Taken 3/30/2025 0800 by Adry Morales RN  Pain Management Interventions: around-the-clock dosing utilized  Intervention: Provide Person-Centered Care  Recent Flowsheet Documentation  Taken 3/30/2025 1200 by Adry Morales RN  Trust Relationship/Rapport:   care explained   reassurance provided  Taken 3/30/2025 0800 by Adry Morales RN  Trust Relationship/Rapport:   care explained   reassurance provided  Goal: Readiness for Transition of Care  Outcome: Progressing     Problem: Violence Risk or Actual  Goal: Anger and Impulse Control  Outcome:  Progressing  Intervention: Minimize Safety Risk  Recent Flowsheet Documentation  Taken 3/30/2025 1800 by Adry Morales RN  De-Escalation Techniques:   appropriate behavior reinforced   verbally redirected   stimulation decreased   reoriented   quiet time facilitated  Enhanced Safety Measures: monitored by video  Taken 3/30/2025 1600 by Adry Morales RN  De-Escalation Techniques:   appropriate behavior reinforced   verbally redirected   stimulation decreased   reoriented   quiet time facilitated  Enhanced Safety Measures: monitored by video  Taken 3/30/2025 1400 by Adry Morales RN  De-Escalation Techniques:   appropriate behavior reinforced   verbally redirected   stimulation decreased   reoriented   quiet time facilitated  Enhanced Safety Measures: monitored by video  Taken 3/30/2025 1200 by Adry Morales RN  De-Escalation Techniques:   appropriate behavior reinforced   verbally redirected   stimulation decreased   reoriented   quiet time facilitated  Enhanced Safety Measures:   bed alarm set   monitored by video  Taken 3/30/2025 1000 by Adry Morales RN  De-Escalation Techniques:   appropriate behavior reinforced   verbally redirected   stimulation decreased   reoriented   quiet time facilitated  Enhanced Safety Measures:   bed alarm set   monitored by video  Taken 3/30/2025 0800 by Adry Morales RN  Sensory Stimulation Regulation:   care clustered   quiet environment promoted  De-Escalation Techniques:   appropriate behavior reinforced   verbally redirected   stimulation decreased   reoriented   quiet time facilitated  Enhanced Safety Measures:   bed alarm set   monitored by video     Problem: Fall Injury Risk  Goal: Absence of Fall and Fall-Related Injury  Outcome: Progressing  Intervention: Identify and Manage Contributors  Recent Flowsheet Documentation  Taken 3/30/2025 1800 by Adry Morales RN  Medication Review/Management:  medications reviewed  Taken 3/30/2025 1600 by Adry Morales, RN  Medication Review/Management: dosing adjusted  Taken 3/30/2025 1400 by Adry Morales, RN  Medication Review/Management: medications reviewed  Taken 3/30/2025 1200 by Adry Morales, RN  Medication Review/Management: medications reviewed  Taken 3/30/2025 1000 by Adry Morales, RN  Medication Review/Management: medications reviewed  Taken 3/30/2025 0800 by Adry Morales, RN  Medication Review/Management: medications reviewed  Intervention: Promote Injury-Free Environment  Recent Flowsheet Documentation  Taken 3/30/2025 1800 by Adry Morales, RN  Safety Promotion/Fall Prevention:   fall prevention program maintained   room organization consistent   safety round/check completed  Taken 3/30/2025 1600 by Adry Morales, RN  Safety Promotion/Fall Prevention:   safety round/check completed   room organization consistent   fall prevention program maintained  Taken 3/30/2025 1400 by Adry Morales, RN  Safety Promotion/Fall Prevention:   activity supervised   clutter free environment maintained   fall prevention program maintained   nonskid shoes/slippers when out of bed   room organization consistent   safety round/check completed  Taken 3/30/2025 1200 by Adry Morales, RN  Safety Promotion/Fall Prevention:   activity supervised   clutter free environment maintained   fall prevention program maintained   nonskid shoes/slippers when out of bed   room organization consistent   safety round/check completed  Taken 3/30/2025 1000 by Adry Morales, RN  Safety Promotion/Fall Prevention:   activity supervised   clutter free environment maintained   fall prevention program maintained   nonskid shoes/slippers when out of bed   room organization consistent   safety round/check completed  Taken 3/30/2025 0800 by Adry Morales, RN  Safety Promotion/Fall  Prevention:   activity supervised   clutter free environment maintained   fall prevention program maintained   nonskid shoes/slippers when out of bed   room organization consistent   safety round/check completed     Problem: Mechanical Ventilation Invasive  Goal: Effective Communication  Outcome: Progressing  Intervention: Ensure Effective Communication  Recent Flowsheet Documentation  Taken 3/30/2025 1800 by Adry Morales RN  Diversional Activities: television  Taken 3/30/2025 1600 by Adry Morales RN  Diversional Activities: television  Taken 3/30/2025 1400 by Adry Morales RN  Diversional Activities: television  Taken 3/30/2025 1200 by Adry Morales RN  Trust Relationship/Rapport:   care explained   reassurance provided  Diversional Activities: television  Taken 3/30/2025 1000 by Adry Morales RN  Diversional Activities: television  Taken 3/30/2025 0800 by Adry Morales RN  Trust Relationship/Rapport:   care explained   reassurance provided  Diversional Activities: television  Communication Enhancement Strategies: extra time allowed for response  Goal: Optimal Device Function  Outcome: Progressing  Intervention: Optimize Device Care and Function  Recent Flowsheet Documentation  Taken 3/30/2025 1800 by Adry Morales RN  Oral Care:   suction provided   tongue brushed   teeth brushed - suction toothbrush  Taken 3/30/2025 1600 by Adry Morales RN  Oral Care:   tongue brushed   teeth brushed - suction toothbrush   swabbed with antiseptic solution   suction provided   lip/mouth moisturizer applied  Taken 3/30/2025 1400 by Adry Morales RN  Oral Care:   tongue brushed   teeth brushed - suction toothbrush   swabbed with antiseptic solution   suction provided   lip/mouth moisturizer applied  Taken 3/30/2025 1200 by Adry Morales RN  Oral Care:   tongue brushed   teeth brushed - suction toothbrush    swabbed with antiseptic solution   suction provided   lip/mouth moisturizer applied  Airway Safety Measures:   manual resuscitator/mask at bedside   suction at bedside   oxygen flowmeter at bedside  Taken 3/30/2025 1000 by Adry Morales, RN  Airway Safety Measures:   manual resuscitator/mask at bedside   suction at bedside   oxygen flowmeter at bedside  Taken 3/30/2025 0800 by Adry Morales, RN  Airway/Ventilation Management: calming measures promoted  Oral Care:   tongue brushed   teeth brushed - suction toothbrush   swabbed with antiseptic solution   suction provided   lip/mouth moisturizer applied  Airway Safety Measures:   manual resuscitator/mask at bedside   suction at bedside   oxygen flowmeter at bedside  Goal: Mechanical Ventilation Liberation  Outcome: Progressing  Intervention: Promote Extubation and Mechanical Ventilation Liberation  Recent Flowsheet Documentation  Taken 3/30/2025 1800 by Adry Morales, RN  Medication Review/Management: medications reviewed  Taken 3/30/2025 1600 by Adry Morales, RN  Medication Review/Management: dosing adjusted  Taken 3/30/2025 1400 by Adry Morales, RN  Medication Review/Management: medications reviewed  Taken 3/30/2025 1200 by Adry Morales, RN  Medication Review/Management: medications reviewed  Taken 3/30/2025 1000 by Adry Morales, RN  Medication Review/Management: medications reviewed  Taken 3/30/2025 0800 by Adry Morales, RN  Medication Review/Management: medications reviewed  Goal: Optimal Nutrition Delivery  Outcome: Progressing  Goal: Absence of Device-Related Skin and Tissue Injury  Outcome: Progressing  Intervention: Maintain Skin and Tissue Health  Recent Flowsheet Documentation  Taken 3/30/2025 1800 by Adry Morales, RN  Device Skin Pressure Protection:   absorbent pad utilized/changed   adhesive use limited   positioning supports utilized   pressure points  protected  Taken 3/30/2025 1600 by Adry Morales RN  Device Skin Pressure Protection:   absorbent pad utilized/changed   adhesive use limited   positioning supports utilized   pressure points protected  Taken 3/30/2025 1400 by Adry Morales RN  Device Skin Pressure Protection:   absorbent pad utilized/changed   adhesive use limited   positioning supports utilized   pressure points protected  Taken 3/30/2025 1200 by Adry Morales RN  Device Skin Pressure Protection:   absorbent pad utilized/changed   positioning supports utilized   adhesive use limited   pressure points protected  Taken 3/30/2025 1000 by Adry Morales RN  Device Skin Pressure Protection:   absorbent pad utilized/changed   adhesive use limited   positioning supports utilized   pressure points protected  Taken 3/30/2025 0800 by Adry Morales RN  Device Skin Pressure Protection:   absorbent pad utilized/changed   adhesive use limited   positioning supports utilized   pressure points protected  Goal: Absence of Ventilator-Induced Lung Injury  Outcome: Progressing  Intervention: Facilitate Lung-Protection Measures  Recent Flowsheet Documentation  Taken 3/30/2025 0800 by dAry Morales RN  Lung Protection Measures:   fluid excess minimized   low inspiratory pressure provided   low tidal volume provided   lung compliance monitored   optimal PEEP applied   plateau/inspiratory pressure monitored   ventilator synchrony promoted   ventilator waveforms monitored  Intervention: Prevent Ventilator-Associated Pneumonia  Recent Flowsheet Documentation  Taken 3/30/2025 1800 by Adry Morales RN  Head of Bed ([facility]) Positioning: HOB at 20-30 degrees  Oral Care:   suction provided   tongue brushed   teeth brushed - suction toothbrush  Taken 3/30/2025 1600 by Adry Morales RN  Head of Bed ([facility]) Positioning: HOB at 20-30 degrees  Oral Care:   tongue brushed   teeth brushed -  suction toothbrush   swabbed with antiseptic solution   suction provided   lip/mouth moisturizer applied  Taken 3/30/2025 1400 by Adry Morales RN  Head of Bed (hospitals) Positioning: HOB at 30 degrees  Oral Care:   tongue brushed   teeth brushed - suction toothbrush   swabbed with antiseptic solution   suction provided   lip/mouth moisturizer applied  Taken 3/30/2025 1200 by Adry Morales RN  Head of Bed (hospitals) Positioning: HOB at 30 degrees  Oral Care:   tongue brushed   teeth brushed - suction toothbrush   swabbed with antiseptic solution   suction provided   lip/mouth moisturizer applied  Taken 3/30/2025 1000 by Adry Morales RN  Head of Bed (hospitals) Positioning: HOB at 30 degrees  Taken 3/30/2025 0800 by Adry Morales RN  Head of Bed (hospitals) Positioning: HOB at 30 degrees  VAP Prevention Bundle:   HOB elevation maintained   oral care regularly provided   stress ulcer prophylaxis provided   vent circuit breaks minimized   VTE prophylaxis provided  VAP Prevention Contraindications: condition unstable  VAP Prevention Measures: not completed (see contraindications)  Oral Care:   tongue brushed   teeth brushed - suction toothbrush   swabbed with antiseptic solution   suction provided   lip/mouth moisturizer applied     Problem: Skin Injury Risk Increased  Goal: Skin Health and Integrity  Outcome: Progressing  Intervention: Optimize Skin Protection  Recent Flowsheet Documentation  Taken 3/30/2025 1800 by Adry Morales RN  Activity Management: bedrest  Pressure Reduction Techniques:   frequent weight shift encouraged   heels elevated off bed   pressure points protected   weight shift assistance provided  Head of Bed ([facility]) Positioning: HOB at 20-30 degrees  Pressure Reduction Devices:   alternating pressure pump (ARNOL)   elbow protectors utilized   specialty bed utilized   pressure-redistributing mattress utilized   positioning supports utilized   heel offloading device  utilized   foam padding utilized  Skin Protection:   incontinence pads utilized   transparent dressing maintained   silicone foam dressing in place   protective footwear used  Taken 3/30/2025 1600 by Adry Morales, RN  Activity Management: bedrest  Pressure Reduction Techniques:   frequent weight shift encouraged   heels elevated off bed   pressure points protected   weight shift assistance provided  Head of Bed ([facility]) Positioning: HOB at 20-30 degrees  Pressure Reduction Devices:   alternating pressure pump (ARNOL)   elbow protectors utilized   specialty bed utilized   pressure-redistributing mattress utilized   positioning supports utilized   heel offloading device utilized   foam padding utilized  Skin Protection:   incontinence pads utilized   transparent dressing maintained   silicone foam dressing in place   protective footwear used  Taken 3/30/2025 1400 by Adry Morales, RN  Activity Management: bedrest  Pressure Reduction Techniques:   frequent weight shift encouraged   heels elevated off bed   pressure points protected   weight shift assistance provided  Head of Bed ([facility]) Positioning: HOB at 30 degrees  Pressure Reduction Devices:   alternating pressure pump (ARNOL)   elbow protectors utilized   specialty bed utilized   pressure-redistributing mattress utilized   positioning supports utilized   heel offloading device utilized   foam padding utilized  Skin Protection:   incontinence pads utilized   transparent dressing maintained   silicone foam dressing in place   protective footwear used  Taken 3/30/2025 1200 by Adry Morales, RN  Activity Management: bedrest  Pressure Reduction Techniques:   frequent weight shift encouraged   heels elevated off bed   pressure points protected   weight shift assistance provided  Head of Bed ([facility]) Positioning: HOB at 30 degrees  Pressure Reduction Devices:   alternating pressure pump (ARNOL)   elbow protectors utilized   specialty bed utilized    pressure-redistributing mattress utilized   heel offloading device utilized   positioning supports utilized   foam padding utilized  Skin Protection:   incontinence pads utilized   transparent dressing maintained   silicone foam dressing in place   protective footwear used  Taken 3/30/2025 1000 by Adry Morales, RN  Activity Management: bedrest  Pressure Reduction Techniques:   frequent weight shift encouraged   heels elevated off bed   pressure points protected   weight shift assistance provided  Head of Bed ([facility]) Positioning: HOB at 30 degrees  Pressure Reduction Devices:   alternating pressure pump (ARNOL)   elbow protectors utilized   specialty bed utilized   pressure-redistributing mattress utilized   positioning supports utilized   heel offloading device utilized   foam padding utilized  Skin Protection:   incontinence pads utilized   transparent dressing maintained   silicone foam dressing in place   protective footwear used  Taken 3/30/2025 0800 by Adry Morales, RN  Activity Management: bedrest  Pressure Reduction Techniques:   frequent weight shift encouraged   heels elevated off bed   pressure points protected   weight shift assistance provided  Head of Bed ([facility]) Positioning: HOB at 30 degrees  Pressure Reduction Devices:   alternating pressure pump (ARNOL)   elbow protectors utilized   specialty bed utilized   pressure-redistributing mattress utilized   positioning supports utilized   heel offloading device utilized   foam padding utilized  Skin Protection:   incontinence pads utilized   transparent dressing maintained   silicone foam dressing in place   protective footwear used     Problem: Restraint, Nonviolent  Goal: Absence of Harm or Injury  Outcome: Progressing  Intervention: Implement Least Restrictive Safety Strategies  Recent Flowsheet Documentation  Taken 3/30/2025 1800 by Adry Morales, RN  Medical Device Protection:   IV pole/bag removed from visual field   torso  covered   tubing secured  Diversional Activities: television  Taken 3/30/2025 1600 by Adry Morales RN  Medical Device Protection:   IV pole/bag removed from visual field   torso covered   tubing secured  Diversional Activities: television  Taken 3/30/2025 1400 by Adry Morales RN  Medical Device Protection:   IV pole/bag removed from visual field   torso covered   tubing secured  Diversional Activities: television  Taken 3/30/2025 1200 by Adry Morales RN  Medical Device Protection:   IV pole/bag removed from visual field   torso covered   tubing secured  Diversional Activities: television  Taken 3/30/2025 1000 by Adry Morales RN  Medical Device Protection:   IV pole/bag removed from visual field   torso covered   tubing secured  Diversional Activities: television  Taken 3/30/2025 0800 by Adry Morales RN  Medical Device Protection:   IV pole/bag removed from visual field   torso covered   tubing secured  Diversional Activities: television  Intervention: Protect Dignity, Rights and Personal Wellbeing  Recent Flowsheet Documentation  Taken 3/30/2025 1200 by Adry Morales RN  Trust Relationship/Rapport:   care explained   reassurance provided  Taken 3/30/2025 0800 by Adry Morales RN  Trust Relationship/Rapport:   care explained   reassurance provided  Intervention: Protect Skin and Joint Integrity  Recent Flowsheet Documentation  Taken 3/30/2025 1800 by Adry Morales RN  Body Position:   turned   left   lower extremity elevated   upper extremity elevated  Skin Protection:   incontinence pads utilized   transparent dressing maintained   silicone foam dressing in place   protective footwear used  Range of Motion: ROM (range of motion) performed  Taken 3/30/2025 1600 by Adry Morales RN  Body Position:   turned   lower extremity elevated   neutral head position   neutral body alignment   upper extremity  elevated  Skin Protection:   incontinence pads utilized   transparent dressing maintained   silicone foam dressing in place   protective footwear used  Range of Motion: ROM (range of motion) performed  Taken 3/30/2025 1400 by Adry Morales RN  Body Position:   turned   right   lower extremity elevated   upper extremity elevated  Skin Protection:   incontinence pads utilized   transparent dressing maintained   silicone foam dressing in place   protective footwear used  Range of Motion: ROM (range of motion) performed  Taken 3/30/2025 1200 by Adry Morales RN  Body Position:   turned   lower extremity elevated   neutral body alignment   neutral head position   upper extremity elevated  Skin Protection:   incontinence pads utilized   transparent dressing maintained   silicone foam dressing in place   protective footwear used  Range of Motion: ROM (range of motion) performed  Taken 3/30/2025 1000 by Adry Morales RN  Body Position:   turned   left   lower extremity elevated   upper extremity elevated  Skin Protection:   incontinence pads utilized   transparent dressing maintained   silicone foam dressing in place   protective footwear used  Range of Motion: ROM (range of motion) performed  Taken 3/30/2025 0800 by Adry Morales RN  Body Position:   turned   lower extremity elevated   neutral body alignment   neutral head position   upper extremity elevated  Skin Protection:   incontinence pads utilized   transparent dressing maintained   silicone foam dressing in place   protective footwear used  Range of Motion: ROM (range of motion) performed     Problem: Comorbidity Management  Goal: Maintenance of Behavioral Health Symptom Control  Outcome: Progressing  Intervention: Maintain Behavioral Health Symptom Control  Recent Flowsheet Documentation  Taken 3/30/2025 1800 by Adry Morales RN  Medication Review/Management: medications reviewed  Taken 3/30/2025 1600 by  Adry Morales, RN  Medication Review/Management: dosing adjusted  Taken 3/30/2025 1400 by Adry Morales, RN  Medication Review/Management: medications reviewed  Taken 3/30/2025 1200 by Adry Morales RN  Medication Review/Management: medications reviewed  Taken 3/30/2025 1000 by Adry Morales RN  Medication Review/Management: medications reviewed  Taken 3/30/2025 0800 by Adry Morales RN  Medication Review/Management: medications reviewed  Goal: Blood Glucose Level Within Target Range  Outcome: Progressing  Intervention: Monitor and Manage Glycemia  Recent Flowsheet Documentation  Taken 3/30/2025 1800 by Adry Morales RN  Medication Review/Management: medications reviewed  Taken 3/30/2025 1600 by Adry Morales RN  Medication Review/Management: dosing adjusted  Taken 3/30/2025 1400 by Adry Morales RN  Medication Review/Management: medications reviewed  Taken 3/30/2025 1200 by Adry Morales, RN  Medication Review/Management: medications reviewed  Taken 3/30/2025 1000 by Adry Morales, RN  Medication Review/Management: medications reviewed  Taken 3/30/2025 0800 by Adry Morales RN  Medication Review/Management: medications reviewed  Goal: Maintenance of Heart Failure Symptom Control  Outcome: Progressing  Intervention: Maintain Heart Failure Management  Recent Flowsheet Documentation  Taken 3/30/2025 1800 by Adry Morales, RN  Medication Review/Management: medications reviewed  Taken 3/30/2025 1600 by Adry Morales, RN  Medication Review/Management: dosing adjusted  Taken 3/30/2025 1400 by Adry Morales, RN  Medication Review/Management: medications reviewed  Taken 3/30/2025 1200 by Adry Morales, RN  Medication Review/Management: medications reviewed  Taken 3/30/2025 1000 by Adry Morales, RN  Medication Review/Management: medications  reviewed  Taken 3/30/2025 0800 by Adry Morales, RN  Medication Review/Management: medications reviewed  Goal: Blood Pressure in Desired Range  Outcome: Progressing  Intervention: Maintain Blood Pressure Management  Recent Flowsheet Documentation  Taken 3/30/2025 1800 by Adry Morales, RN  Medication Review/Management: medications reviewed  Taken 3/30/2025 1600 by Adry Morales, RN  Medication Review/Management: dosing adjusted  Taken 3/30/2025 1400 by Adry Morales, RN  Medication Review/Management: medications reviewed  Taken 3/30/2025 1200 by Adry Morales, RN  Medication Review/Management: medications reviewed  Taken 3/30/2025 1000 by Adry Morales, RN  Medication Review/Management: medications reviewed  Taken 3/30/2025 0800 by Adry Morales, RN  Medication Review/Management: medications reviewed   Goal Outcome Evaluation:              Outcome Evaluation: Pt remains intubated/sedated.  Peep @ 7, Fio2 @ 40, fentanyl @ 200, propofol @ 30.  EGD with peg exchange at bedside today, tolerated well.   mL.  BM x1, no signs of bleeding.  H&H stable, monitoring q6.  1600 assessment, left pupil noted to be oval in shape and slightly larger than the right.  MD aware.  No other neuro changes noted.  Head CT scan completed. Restraints remain in place.

## 2025-03-30 NOTE — PROGRESS NOTES
"Pharmacy Consult-Vancomycin Dosing  Omkar Nava is a  69 y.o. male receiving vancomycin therapy.     Indication: PNA  Consulting Provider: intensivist  ID Consult:     Goal AUC: 400 - 600 mg/L*hr    Current Antimicrobial Therapy  Anti-Infectives (From admission, onward)      Ordered     Dose/Rate Route Frequency Start Stop    03/29/25 1950  piperacillin-tazobactam (ZOSYN) 3.375 g IVPB in 100 mL NS MBP (CD)        Ordering Provider: John Knight APRN    3.375 g  over 4 Hours Intravenous Every 8 Hours 03/30/25 0200 04/04/25 0159 03/29/25 2126  Pharmacy to dose vancomycin        Ordering Provider: John Knight APRN     Not Applicable Continuous PRN 03/29/25 2126 04/05/25 2125 03/29/25 1950  vancomycin IVPB 1500 mg in 0.9% NaCl (Premix) 500 mL        Ordering Provider: John Knight APRN    20 mg/kg × 80 kg  333.3 mL/hr over 90 Minutes Intravenous Once 03/29/25 2030 03/29/25 2310    03/29/25 1731  piperacillin-tazobactam (ZOSYN) 3.375 g IVPB in 100 mL NS MBP (CD)        Ordering Provider: Lizandro Miranda MD    3.375 g Intravenous Once 03/29/25 1747 03/29/25 2026            Allergies  Allergies as of 03/29/2025    (No Known Allergies)       Labs    Results from last 7 days   Lab Units 03/29/25  2348 03/29/25  1708   BUN mg/dL 43* 46*   CREATININE mg/dL 1.49* 1.54*       Results from last 7 days   Lab Units 03/29/25  1708   WBC 10*3/mm3 6.25       Evaluation of Dosing     Last Dose Received in the ED/Outside Facility: 1500mg in ED  Is Patient on Dialysis or Renal Replacement: no    Ht - 182 cm (71.65\")  Wt - 87 kg (191 lb 12.8 oz)    Estimated Creatinine Clearance: 59.2 mL/min (A) (by C-G formula based on SCr of 1.49 mg/dL (H)).    Intake & Output (last 3 days)         03/27 0701 03/28 0700 03/28 0701 03/29 0700 03/29 0701 03/30 0700    I.V. (mL/kg)   117.8 (1.4)    Blood   604.6    Other   180    IV Piggyback   580.3    Total Intake(mL/kg)   1482.7 (17)    Urine (mL/kg/hr)   3350    Total " Output   3350    Net   -1867.3                   Microbiology and Radiology  Microbiology Results (last 10 days)       Procedure Component Value - Date/Time    COVID-19, FLU A/B, RSV PCR 1 HR TAT - Swab, Nasopharynx [[id number]]  (Normal) Collected: 03/29/25 1829    Lab Status: Final result Specimen: Swab from Nasopharynx Updated: 03/29/25 1925     COVID19 Not Detected     Influenza A PCR Not Detected     Influenza B PCR Not Detected     RSV, PCR Not Detected    Narrative:      Fact sheet for providers: https://www.fda.gov/media/140594/download    Fact sheet for patients: https://www.fda.gov/media/136810/download    Test performed by PCR.            Reported Vancomycin Levels                         InsightRX AUC Calculation:    Current AUC:   0 mg/L*hr    Predicted Steady State AUC :   571 mg/L*hr    Assessment/Plan: The patient will be started on a bolus of 20mg/kg for a dose of 1500mg . Will initiate maintenance dose at 1500 mg IV every 24 hours.  Plan for trough as patient approaches steady state, prior to the 3rd dose.  Due to infection severity, will target an AUC of 400-600.      Pharmacy will continue to follow the patient’s culture results and clinical progress daily.    Jg Johnson, RenateD

## 2025-03-30 NOTE — PROGRESS NOTES
INTENSIVIST   PROGRESS NOTE        SUBJECTIVE     Omkar 69 y.o. male is followed for: Shortness of Breath       Acute respiratory failure with hypoxia    As an Intensivist, we have completed an accredited Critical Care program and maintain Board Certification and dedicate most of our professional work to critical/intensive care. We serve as the  or member of an interprofessional team, coordinating and guiding healthcare professionals to provide specialized care for critically ill patients. We manage and resuscitate patients with, or at risk for, critical illness and organ failure and initiate interventions to prevent imminent deterioration. ( Consensus Statement from the Society of Critical Care Medicine Defining Intensivist Task Force. [facility] . DOI: 10.1097/[facility].6922814062455538     Interval History:    Sedated.    Intubated.    Sister at the bedside.    Plan for scope this morning.         Temp  Min: 97.1 °F (36.2 °C)  Max: 98.6 °F (37 °C)       History     Last Reviewed by Salo Arellano MD on 3/30/2025 at  7:11 AM    Sections Reviewed    Medical, Surgical, Family, Tobacco, Alcohol, Drug Use, Sexual Activity,   Social Documentation    Problem list reviewed by Salo Arellano MD on 3/30/2025 at  7:10 AM  Medicines reviewed by Salo Arellano MD on 3/30/2025 at  7:10 AM  Allergies reviewed by Salo Arellano MD on 3/30/2025 at  7:10 AM       The patient's relevant past medical, surgical and social history were reviewed and updated in Epic as appropriate.        OBJECTIVE     Physical Examination    Vitals:  Temp: 98.6 °F (37 °C) (25 0400) Temp  Min: 97.1 °F (36.2 °C)  Max: 98.6 °F (37 °C)   Temp core:      BP: 121/47 (25 0500) BP  Min: 121/47  Max: 181/79   MAP (non-invasive) Noninvasive MAP (mmHg): 60 (25 0500) Noninvasive MAP (mmHg)  Av  Min: 60  Max: 123   Pulse: 59 (25 0535) Pulse  Min: 57  Max: 78   Resp: 20 (25 0535) Resp  Min: 20  Max: 40   SpO2: 96 %  (03/30/25 0535) SpO2  Min: 87 %  Max: 100 %   Device: ventilator (03/30/25 0535)    Flow Rate: 6 (03/29/25 1858) Flow (L/min) (Oxygen Therapy)  Min: 2  Max: 6     Intake/Ouptut 24 hrs (7:00AM - 6:59 AM)  Intake & Output (last 2 days)         03/28 0701 03/29 0700 03/29 0701 03/30 0700 03/30 0701 03/31 0700    I.V. (mL/kg)  117.8 (1.4)     Blood  604.6     Other  180     IV Piggyback  580.3     Total Intake(mL/kg)  1482.7 (17)     Urine (mL/kg/hr)  3350     Total Output  3350     Net  -1867.3                  Medications (drips):  fentanyl 10 mcg/mL, Last Rate: 150 mcg/hr (03/30/25 0415)  Pharmacy to dose vancomycin  propofol, Last Rate: 15 mcg/kg/min (03/30/25 0002)          03/29/25  1649 03/30/25  0000   Weight: 80 kg (176 lb 5.9 oz) 87 kg (191 lb 12.8 oz)      Invasive Mechanical Ventilator   Settings: Observed:   Mode: VC+/AC (03/30/25 0535)    Resp Rate (Set): 20 (03/30/25 0535) Resp Rate (Observed) Vent: 20 (03/30/25 0535)   Vt (Set, mL): 520 mL (03/30/25 0535) Vt, Exp (observed, mL): 520 mL (03/30/25 0535)    Minute Ventilation (L/min) (Obs): 10.4 L/min (03/30/25 0535)    I:E Ratio (Obs): 1:2 (03/30/25 0535)       FiO2 (%): 50 % (03/30/25 0535) Plateau Pressure (cm H2O): 0 cm H2O (03/29/25 2227)   PEEP/CPAP (cm H2O): 7 cm H20 (03/30/25 0535) Driving Pressure (cm H2O): 0 cm H2O (03/29/25 2227)    Static Compliance (L/cm H2O): 0 (03/29/25 2227)      Telemetry:  Rhythm: normal sinus rhythm (03/30/25 0400)         Constitutional:  No acute distress.   Cardiovascular: RRR.    Respiratory: Normal breath sounds  No adventitious sounds   Abdominal:  Soft with no tenderness.   Extremities: No Edema   Neurological:   Sedated.  Best Eye Response: 3-->(E3) to speech (03/30/25 0400)  Best Motor Response: 4-->(M4) withdraws from pain (03/30/25 0400)  Best Verbal Response: 1-->(V1) none (03/30/25 0400)  [place] Coma Scale Score: 8 (03/30/25 0400)     Results  Reviewed:  Laboratory  Microbiology  Radiology  Pathology    Hematology:  Results from last 7 days   Lab Units 03/30/25  0612 03/29/25  1708   WBC 10*3/mm3 8.17 6.25   HEMOGLOBIN g/dL 7.8*  7.9* 6.6*   MCV fL 85.1 87.6   PLATELETS 10*3/mm3 221 254     Results from last 7 days   Lab Units 03/29/25  1708   NEUTROS ABS 10*3/mm3 4.68   LYMPHS ABS 10*3/mm3 0.59*   EOS ABS 10*3/mm3 0.02     Chemistry:  Estimated Creatinine Clearance: 59.2 mL/min (A) (by C-G formula based on SCr of 1.49 mg/dL (H)).    Results from last 7 days   Lab Units 03/29/25  2348 03/29/25  1708   SODIUM mmol/L 122* 120*   POTASSIUM mmol/L 5.1 6.4*   CHLORIDE mmol/L 91* 91*   CO2 mmol/L 18.0* 16.0*   BUN mg/dL 43* 46*   CREATININE mg/dL 1.49* 1.54*   GLUCOSE mg/dL 89 65     Results from last 7 days   Lab Units 03/30/25  0612 03/29/25  2348 03/29/25  1708   CALCIUM mg/dL  --  8.7 8.9   MAGNESIUM mg/dL 2.2  --   --    PHOSPHORUS mg/dL 3.4  --   --      Hepatic Panel:  Results from last 7 days   Lab Units 03/29/25  1708   ALBUMIN g/dL 3.8   TOTAL PROTEIN g/dL 7.5   BILIRUBIN mg/dL 0.2   AST (SGOT) U/L 30   ALT (SGPT) U/L 29   ALK PHOS U/L 102       Cardiac Labs:  Results from last 7 days   Lab Units 03/29/25  1828 03/29/25  1708   PROBNP pg/mL  --  3,216.0*   HSTROP T ng/L 100* 98*     Biomarkers:  Results from last 7 days   Lab Units 03/29/25 1828 03/29/25  1708   LACTATE mmol/L  --  0.7   PROCALCITONIN ng/mL 0.39*  --      U/A  Results from last 7 days   Lab Units 03/29/25  1944   COLOR UA  Yellow   CLARITY UA  Cloudy*   PH, URINE  5.5   SPECIFIC GRAVITY, URINE  1.008   GLUCOSE UA  Negative   KETONES UA  Negative   BILIRUBIN UA  Negative   PROTEIN UA  Negative   BLOOD UA  Small (1+)*   LEUKOCYTES UA  Large (3+)*   NITRITE UA  Negative   UROBILINOGEN UA  0.2 E.U./dL     Results from last 7 days   Lab Units 03/29/25 1944   RBC UA /HPF 0-2   WBC UA /HPF Too Numerous to Count*   BACTERIA UA /HPF 4+*   SQUAM EPITHEL UA /HPF None Seen   HYALINE CASTS UA  /LPF None Seen       COVID-19  Lab Results   Component Value Date    COVID19 Not Detected 03/29/2025    COVID19 Not Detected 11/07/2024    COVID19 Not Detected 10/22/2024    COVID19 Detected (C) 09/11/2024       Arterial Blood Gases:  Results from last 7 days   Lab Units 03/30/25  0435 03/30/25  0026 03/29/25  1849   PH, ARTERIAL pH units 7.439 7.356  --    PCO2, ARTERIAL mm Hg 32.5* 36.2  --    PO2 ART mm Hg 67.5* 158.0*  --    FIO2 % 50 100 21       Images:  XR Chest 1 View  Result Date: 3/30/2025  1.Interval worsening of bilateral infiltrates. 2.Endotracheal tube in good position. Electronically Signed: Ricky Valverde MD  3/30/2025 5:45 AM EDT  Workstation ID: [id number]    CT Head Without Contrast  Result Date: 3/30/2025  Impression: Atrophy and chronic microvascular ischemic change. No acute intracranial process. Electronically Signed: Boby Riggs MD  3/30/2025 4:21 AM EDT  Workstation ID: [id number]    XR Chest 1 View  Result Date: 3/29/2025  Impression: New endotracheal tube tip in the mid trachea. Worsening pulmonary edema pattern and bibasilar airspace disease. Stable small bilateral pleural effusions. Electronically Signed: Princess Jeong MD  3/29/2025 11:02 PM EDT  Workstation ID: [id number]    XR Chest 1 View  Result Date: 3/29/2025  Impression: 1.Hazy bilateral airspace opacities, which could reflect pulmonary edema or pneumonia. 2.Small right pleural effusion. Electronically Signed: Omkar Umana MD  3/29/2025 5:19 PM EDT  Workstation ID: [id number]      Echo:  Results for orders placed during the hospital encounter of 11/07/24    Adult Transthoracic Echo Limited W/ Cont if Necessary Per Protocol    Interpretation Summary    Left ventricular ejection fraction appears to be 56 - 60%.    Left ventricular wall thickness is consistent with concentric hypertrophy.  moderate.  Strain is normal and in normal pattern.  GLS -24%    Left ventricular diastolic dysfunction is noted.    There is a trivial pericardial  effusion.      Results: Reviewed.  I reviewed the patient's new laboratory and imaging results.  I independently reviewed the patient's new images.    Medications: Reviewed.    Assessment   A/P     Assessment/Management/Treatment Plan:    Hospital:  LOS: 1 day   ICU: 10h     Active Hospital Problems    Diagnosis  POA    **Acute respiratory failure with hypoxia [J96.01]  Yes     Omkar is a 69 y.o. male, long-term care facility resident, admitted on 3/29/2025 with Acute respiratory failure with hypoxia [J96.01]    In the ED, labs significant for Hb 6.6. and sodium 120.      Family member noted patient has been having blood in stool per nursing facility.      Chest x-ray reviewed, bilateral opacities.      He was placed on BiPAP for hypoxia and work of breathing but eventually required intubation 03/29/25 @ 22:38    Comorbidities: vascular dementia, dysphagia status post PEG tube    Respiratory failure, type 1, acute.  Intubated 03/29/25   Invasive Mechanical Ventilation   Cardiovascular  HFpEF  Neuro  Dementia  GI/Hepatology  Melena prior to admission, as per family report.  Chronic aspiration  PEG  Hematology  Chronic anemia  R/O Anemia Acute Blood Loss  Hyponatremia on admission.  JAYY and hyperkalemia on admission  Endocrine   Body mass index is 26.26 kg/m². Overweight: 25.0-29.9kg/m2   Type 2 diabetes.    Lab Results   Lab Value Date/Time    HGBA1C 5.60 03/29/2025 1708    HGBA1C 5.60 09/15/2024 0658    HGBA1C 8.7 (H) 06/25/2022 0242    HGBA1C 13.4 04/14/2022 1058     Results from last 7 days   Lab Units 03/30/25  0507 03/29/25  2356 03/29/25  2118 03/29/25  2041 03/29/25  2038 03/29/25  1949   GLUCOSE mg/dL 92 110 124 54* 54* 77       Diet: NPO Diet NPO Type: Strict NPO   Advance Directives: Code Status and Medical Interventions: CPR (Attempt to Resuscitate); Full Support   Ordered at: 03/29/25 1926     Code Status (Patient has no pulse and is not breathing):    CPR (Attempt to Resuscitate)     Medical  Interventions (Patient has pulse or is breathing):    Full Support        VTE Prophylaxis:  Mechanical VTE prophylaxis orders are present.         In brief:  EGD today  Not ready to be liberated from Invasive Mechanical Ventilation   Monitor Na  Monitor K  Monitor Renal function.  Serial Hb.  Goal: Glucose < 180 mg/dL.   Continue empiric antibiotics  Disposition: Keep in ICU.    Plan of care and goals reviewed during interdisciplinary rounds.  I discussed the patient's findings and my recommendations with family and nursing staff    Time: was greater than 37 minutes. This is non-concurrent time.   (This excludes time spent performing separately reportable procedures and services).    He has a high risk of imminent or life-threatening  deterioration, which requires the highest level of physician preparedness to intervene urgently. I devoted my full attention to the direct care of this patient for the amount of time indicated above.     Time spent with family or surrogate(s) is included only if the patient was incapable of providing the necessary information or participating in medical decision making.    He has the following organ/system impairments Respiratory Failure.        [x] Primary Attending Intensive Care Medicine - Nutrition Support   [] Consultant    Copied text in this note has been reviewed and is accurate as of 03/30/25

## 2025-03-30 NOTE — BRIEF OP NOTE
ESOPHAGOGASTRODUODENOSCOPY  Progress Note    Omkar Nava  3/30/2025    EGD shows deteriorating PEG.  Exam is otherwise normal.  No blood or source of blood loss seen.  PEG removed endoscopically, resulting in mild mucosal disruption of Schatzki ring, without bleeding.  A 20 Fr Avanos G-tube was inserted into the existing gastrostomy and internal bolster inflated with 10 mL sterile water.  Placement confirmed endoscopically.    >> Resume enteral feeds per G-tube.  >> Will consider colonoscopy before discharge due to worsening iron deficiency anemia and reported melena.  Plan sooner if develops overt GI bleeding.  Has not had a bowel movement in the past day.    Mark I. Brunner, MD     Date: 3/30/2025  Time: 14:40 EDT

## 2025-03-30 NOTE — PLAN OF CARE
Goal Outcome Evaluation:  Plan of Care Reviewed With: patient, child        Progress: improving  Outcome Evaluation:     -Patient admitted from the ED at 2120 with respiratory failure.  Required NT suctioning upon arrival to the unit and placed back on the bipap.   -Intubated at 2232.   -VS stable on the vent with PEEP 7 and FiO2 50%.   -Fentanyl and propofol gtts for sedation. Patient opens his eyes to voice and withdraws from pain, but does not follow commands. Restraints for patient's safety.   -Hbg 6.6. 2 units PRBCs given.   -Patient received K+ shifting agents in the ED. K+ improved from 6.4 to 5.1.   -Lasix x2 doses. 3350 ml uop so far.   -Fall on 3/28 at SNF per daughter. Abrasion on top of head. CT of head completed.   -Pressure ulcers noted on admission. WOC consulted.

## 2025-03-30 NOTE — PLAN OF CARE
Goal Outcome Evaluation:         Unable to wean from ventilatory support at this time.

## 2025-03-31 NOTE — PROCEDURES
"Bronchoscopy    Date/Time: 3/31/2025 2:15 PM    Performed by: Salo Arellano MD  Authorized by: Salo Arellano MD  Consent: Written consent obtained  Risks and benefits: risks, benefits and alternatives were discussed  Consent given by: CALVINK.  Patient understanding: patient does not state understanding of the procedure being performed  Patient consent: the patient's understanding of the procedure does not match consent given  Procedure consent: procedure consent matches procedure scheduled  Required items: required blood products, implants, devices, and special equipment available  Patient identity confirmed: arm band, provided demographic data and hospital-assigned identification number  Time out: Immediately prior to procedure a \"time out\" was called to verify the correct patient, procedure, equipment, support staff and site/side marked as required.    Sedation:  Patient sedated: Already on vent, sedated.    Patient tolerance: patient tolerated the procedure well with no immediate complications        BRONCHOSCOPY REPORT     Date: 03/31/25       Procedure(s): Bronchoscopy, Therapeutic, initial   Surgeon: Salo Arellano MD   Pre-Operative Diagnosis: Atelectasis   Post-Operative Diagnosis: Same   ASA and Mallampati: ASA Class II. Mallampati} N/A (Already intubated)   Time out: Immediately prior to procedure a \"time out\" was called to verify the correct patient, procedure, equipment, support staff and site/side as required.      Findings:    A fiberoptic bronchoscope was inserted into the main airway via the ETT.      An anatomical survey was done of the main airways and the subsegmental bronchus: including the Right Mainstem bronchus, the Right Upper Lobe bronchus, Bronchus Intermedius, Right Middle Lobe, Superior Segment of the Right Lower Lobe and the Basilar segments of the Right Lower Lobe.     Then, the scope was withdrawn back to the sahra and advanced into the Left Mainstem Bronchus, Left Upper Lobe " bronchus, Lingula Bronchus, and the basilar segments of the Left Lower Lobe.    Vocal Cords: Vocal Cords not able to examined since patient is intubated.   Main Tachea and Sahra: Normal.   Bronchial Tree: Heavy amount of yellow/green secretions noted at the level of the sahra, mucus plug in the bronchus intermedius and in the Left mainstem.     Specimens as described in this table:      BW    Location R+L mainstem   [id number] Respiratory Cx& GS [x]   [id number], [id number] BAL Cx Quant & GS    [id number] PSB Cx Quant    [id number] Fungus smear-GMS [x]   [id number] Fungus Cx [x]   [id number] AFB Smear and Cx [x]   [id number] Respiratory Virus, Panel w/COVID [x]   [id number] O+P Examination [x]       Estimated Blood Loss: 0 mL.   Medications: He is already sedated, as he is on Invasive Mechanical Ventilation    Complications: None       Pictures:     Sahra:     Sahra          R Bronch Intermedius:     R Bronch Intermedius after cleaning.   Specimen

## 2025-03-31 NOTE — PROGRESS NOTES
"GI Daily Progress Note  Subjective:    Chief Complaint:  Follow up iron deficiency anemia and bloody stools     Patient is intubated and sedated.     Sister is at the bedside.       Had one small bowel movement today without any evidence of bleeding.       Received an additional unit of pRBCS overnight for anemia (3 units total)    Objective:    BP 98/88   Pulse (!) 47   Temp 98.3 °F (36.8 °C) (Axillary)   Resp 10   Ht 182 cm (71.65\")   Wt 89.2 kg (196 lb 10.4 oz)   SpO2 98%   BMI 26.93 kg/m²     Physical Exam  Constitutional:       Appearance: He is ill-appearing.      Interventions: He is sedated and intubated.   Cardiovascular:      Rate and Rhythm: Normal rate and regular rhythm.   Pulmonary:      Effort: Pulmonary effort is normal. No respiratory distress. He is intubated.   Abdominal:      General: Bowel sounds are normal. There is distension.      Tenderness: There is no abdominal tenderness. There is no guarding.         Lab  Lab Results   Component Value Date    WBC 9.22 03/31/2025    HGB 8.4 (L) 03/31/2025    HGB 7.3 (L) 03/31/2025    HGB 6.3 (C) 03/30/2025    MCV 86.6 03/31/2025     03/31/2025    INR 1.07 11/14/2024    INR 1.05 03/29/2024    INR 1.38 (H) 03/12/2024    INR 1.08 10/18/2022    INR 1.02 10/14/2022       Lab Results   Component Value Date    GLUCOSE 97 03/31/2025    BUN 35 (H) 03/31/2025    CREATININE 1.68 (H) 03/31/2025    EGFRIFNONA >60 07/25/2022    EGFRIFAFRI >60 07/25/2022    BCR 20.8 03/31/2025     (L) 03/31/2025    K 4.3 03/31/2025    CO2 20.0 (L) 03/31/2025    CALCIUM 8.5 (L) 03/31/2025    ALBUMIN 3.2 (L) 03/31/2025    ALKPHOS 121 (H) 03/31/2025    BILITOT 0.6 03/31/2025    BILIDIR <0.2 11/12/2024    ALT 27 03/31/2025    AST 31 03/31/2025       Assessment:    Iron deficiency anemia s/p transfusion of 3 units of PRBCs   Dysphagia, deteriorating PEG tube removed yesterday and exchanged for a 20 Fr Avanos G tube     Acute respiratory failure  Vascular dementia "     Plan:    S/p 3 units of pRBCs total.  No overt GI bleeding today.        >> Will consider colonoscopy prior to discharge due to worsening iron deficiency anemia.        EJAN Encinas  03/31/25  11:54 EDT

## 2025-03-31 NOTE — PROGRESS NOTES
"                  Clinical Nutrition     Patient Name: Omkar Nava  YOB: 1955  MRN: [id number]  Date of Encounter: 03/31/25 09:40 EDT  Admission date: 3/29/2025  Reason for Visit: MST score 2+, EN, Difficulty chewing/swallowing, \"Unsure\" unintentional weight loss    Assessment   Nutrition Assessment   Admission Diagnosis:  Acute respiratory failure with hypoxia [J96.01]    Problem List:    Acute respiratory failure with hypoxia      PMH:   He  has a past medical history of Anemia, Dementia, Diabetes mellitus, Dysphagia, GERD (gastroesophageal reflux disease), History of alcohol abuse, History of cocaine use, History of marijuana use, Hypertension, Osteomyelitis, Poor historian, and Visual impairment.    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    Applicable Nutrition History:   (3/29) Intubated  (3/30) EGD - PEG replaced with Gtube    Labs    Labs Reviewed: Yes    Results from last 7 days   Lab Units 03/31/25  0435 03/30/25  2148 03/30/25  0612 03/29/25  2348 03/29/25  1708   GLUCOSE mg/dL 97 68 69   < > 65   BUN mg/dL 35* 39* 41*   < > 46*   CREATININE mg/dL 1.68* 1.77* 1.58*   < > 1.54*   SODIUM mmol/L 129* 129* 126*   < > 120*   CHLORIDE mmol/L 95* 95* 94*   < > 91*   POTASSIUM mmol/L 4.3 4.3 5.0   < > 6.4*   PHOSPHORUS mg/dL 4.0  --  3.4  --   --    MAGNESIUM mg/dL 2.3  --  2.2  --   --    ALT (SGPT) U/L 27  --  26  --  29   LACTATE mmol/L  --   --   --   --  0.7    < > = values in this interval not displayed.       Results from last 7 days   Lab Units 03/31/25  0435 03/30/25  0612 03/29/25  1708   ALBUMIN g/dL 3.2* 3.4* 3.8   PREALBUMIN mg/dL 16.0*  --   --    CRP mg/dL 15.85*  --   --    TRIGLYCERIDES mg/dL 84  --   --        Results from last 7 days   Lab " "Units 03/31/25  0537 03/30/25  2341 03/30/25  2324 03/30/25  1755 03/30/25  1735 03/30/25  1156   GLUCOSE mg/dL 109 152* 172* 161* 67* 152*     Lab Results   Lab Value Date/Time    HGBA1C 5.60 03/29/2025 1708    HGBA1C 5.60 09/15/2024 0658    HGBA1C 8.7 (H) 06/25/2022 0242    HGBA1C 13.4 04/14/2022 1058         Results from last 7 days   Lab Units 03/29/25  1708   PROBNP pg/mL 3,216.0*       Medications    Medications Reviewed: yes    Scheduled Meds:docusate sodium, 100 mg, Oral, BID  [Held by provider] donepezil, 10 mg, Per PEG Tube, Nightly  [Held by provider] FLUoxetine, 20 mg, Per PEG Tube, Daily  insulin regular, 2-7 Units, Subcutaneous, Q6H  [START ON 4/27/2025] mupirocin, 1 Application, Each Nare, BID  mupirocin, 1 Application, Each Nare, BID  pantoprazole, 40 mg, Intravenous, Q12H  piperacillin-tazobactam, 3.375 g, Intravenous, Q8H  Valproic Acid, 150 mg, Per PEG Tube, Q12H      Continuous Infusions:dextrose, 50 mL/hr, Last Rate: 50 mL/hr (03/31/25 0025)  fentanyl 10 mcg/mL,  mcg/hr, Last Rate: 300 mcg/hr (03/31/25 0904)  propofol, 5-50 mcg/kg/min, Last Rate: 50 mcg/kg/min (03/31/25 0918)      PRN Meds:.  dextrose    fentaNYL    glucagon (human recombinant)    LORazepam    sodium chloride    Intake/Ouptut 24 hrs (0701 - 0700)   I&O's Reviewed: yes  Intake & Output (last day)         03/30 0701 03/31 0700 03/31 0701 04/01 0700    I.V. (mL/kg) 1206 (13.5) 178.9 (2)    Blood 349.2     Other      NG/ 58    IV Piggyback 225.9 15    Total Intake(mL/kg) 2231.1 (25) 251.9 (2.8)    Urine (mL/kg/hr) 1575 (0.7) 275 (1.2)    Stool 0     Total Output 1575 275    Net +656.1 -23.1          Stool Unmeasured Occurrence 1 x           Anthropometrics     Height: Height: 182 cm (71.65\")  Last Filed Weight: Weight: 89.2 kg (196 lb 10.4 oz) (03/31/25 0600)  Method: Weight Method: Bed scale  BMI: BMI (Calculated): 26.9    UBW:    Weight       Weight (kg) Weight (lbs) Weight Method Visit Report   3/31/2024 90.8 kg  " "200 lb 2.8 oz  Bed scale     6/6/2024 79.379 kg  175 lb      9/11/2024 79.4 kg  175 lb 0.7 oz  Estimated     9/12/2024 86.229 kg  190 lb 1.6 oz  Bed scale     10/21/2024 77.021 kg  169 lb 12.8 oz  Bed scale     11/7/2024 77 kg  169 lb 12.1 oz  Stated     11/13/2024 77.293 kg  170 lb 6.4 oz      12/2/2024 80.287 kg  177 lb   --    2/16/2025 80.287 kg  177 lb  Estimated     3/29/2025 80 kg  176 lb 5.9 oz  Estimated     3/30/2025 87 kg  191 lb 12.8 oz  Bed scale     3/31/2025 89.2 kg  196 lb 10.4 oz          Weight change: No significant changes    Nutrition Focused Physical Exam     Date: 3/31    Unable to perform due to Pt unable to participate at time of visit     Subjective   Reported/Observed/Food/Nutrition Related History:     Patient intubated and sedated. Propofol currently stopped. D10 infusing. Deteriorating PEG replaced with Gtube in EGD on POA. Patient with hx dysphagia and malnutrition. Last seen here at [facility] in 11/24, d/c on nocturnal EN @ 43 ml/hr X 12 hr of Novasource Renal to supplement poor PO intakes on mechanical ground, HTL diet at that time. Patient has a healing stage II PI to coccyx and unstageable PI to ankle. Patient family occupied with other providers at my visit, will f/u for full assessment.    Needs Assessment   Date: 3/31    Height used:Height: 182 cm (71.65\")  Weights used: 89 kg    Estimated Calorie needs: ~1850 Kcal/day  Method:  20-22 Kcals/KG [id number]  Method:  MSJ 1698 X 1.1 = 1868  Method:  PSU (Tmax-37.4 Ve-5.1) = 1822    Estimated Protein needs: ~89 g PRO/day initial goal with current renal fx  Method: 1-1.2g/Kg =     Estimated Fluid needs: ~ ml/day   Per clinical status    Current Nutrition Prescription     PO: NPO Diet NPO Type: Strict NPO  Oral Nutrition Supplement:   Intake: N/A    Assessment & Plan   Nutrition Diagnosis   Date: 3/31 Updated:   Problem Inadequate oral intake    Etiology ARF requiring intubation and mechanical ventilation, hx dysphagia   Signs/Symptoms " NPO   Status: New    Goal:   Nutrition to support treatment and Initiate EN, Establish EN tolerance    Nutrition Intervention      Follow treatment progress, Care plan reviewed, Nutrition support order placed    Initiate EN per Intensivist orders (pending).    RD would recommend the following:  Initiate continuous EN regimen of Novasource Renal at 15 ml/hr and advance by 10 ml/hr Q 8 hr as tolerated to goal rate of 45 ml/hr, water flushes 30 ml Q 4 hr    At goal this would provide 900 ml, 1800 kcal, 82 g protein  *D5 providing additional 120 g , 408 kcal     Monitoring/Evaluation:   Per protocol, I&O, Pertinent labs, EN delivery/tolerance, Weight, GI status, Symptoms, POC/GOC, Swallow function, Hemodynamic stability    Chuyita Moreno RD, CNSC  Time Spent: 30m

## 2025-03-31 NOTE — PROGRESS NOTES
INTENSIVIST   PROGRESS NOTE        SUBJECTIVE     Omkar 69 y.o. male is followed for: Shortness of Breath       Acute respiratory failure with hypoxia    As an Intensivist, we have completed an accredited Critical Care program and maintain Board Certification and dedicate most of our professional work to critical/intensive care. We serve as the  or member of an interprofessional team, coordinating and guiding healthcare professionals to provide specialized care for critically ill patients. We manage and resuscitate patients with, or at risk for, critical illness and organ failure and initiate interventions to prevent imminent deterioration. ( Consensus Statement from the Society of Critical Care Medicine Defining Intensivist Task Force. [facility] . DOI: 10.1097/[facility].8840815618009458     Interval History:    Sedated.    No obvious GI bleeding.    Still requiring high FiO2.    Last Bowel Movement: 25 (25 1400)    Temp  Min: 97.7 °F (36.5 °C)  Max: 101.5 °F (38.6 °C)       History     Last Reviewed by Salo Arellano MD on 3/30/2025 at  7:11 AM    Sections Reviewed    Medical, Surgical, Family, Tobacco, Alcohol, Drug Use, Sexual Activity,   Social Documentation    Problem list reviewed by Salo Arellano MD on 3/30/2025 at  7:10 AM  Medicines reviewed by Salo Arellano MD on 3/30/2025 at  7:10 AM  Allergies reviewed by Salo Arellano MD on 3/30/2025 at  7:10 AM       The patient's relevant past medical, surgical and social history were reviewed and updated in Epic as appropriate.        OBJECTIVE     Physical Examination    Vitals:  Temp: 97.7 °F (36.5 °C) (25 0400) Temp  Min: 97.7 °F (36.5 °C)  Max: 101.5 °F (38.6 °C)   Temp core:      BP: 143/59 (25 0600) BP  Min: 67/45  Max: 144/54   MAP (non-invasive) Noninvasive MAP (mmHg): 93 (25 0600) Noninvasive MAP (mmHg)  Av.9  Min: 58  Max: 123   Pulse: 52 (25 0737) Pulse  Min: 48  Max: 67   Resp: 15 (25 0737) Resp   Min: 15  Max: 20   SpO2: 97 % (03/31/25 0737) SpO2  Min: 91 %  Max: 100 %   Device: ventilator (03/31/25 0737)    Flow Rate: 6 (03/29/25 1858) No data recorded     Intake/Ouptut 24 hrs (7:00AM - 6:59 AM)  Intake & Output (last 2 days)         03/29 0701 03/30 0700 03/30 0701 03/31 0700 03/31 0701 04/01 0700    I.V. (mL/kg) 155.8 (1.8) 1206 (13.5)     Blood 604.6 349.2     Other 240      NG/GT  450     IV Piggyback 624 225.9     Total Intake(mL/kg) 1624.4 (18.7) 2231.1 (25)     Urine (mL/kg/hr) 3650 1575 (0.7)     Stool  0     Total Output 3650 1575     Net -2025.6 +656.1            Stool Unmeasured Occurrence  1 x           Medications (drips):  dextrose, Last Rate: 50 mL/hr (03/31/25 0025)  fentanyl 10 mcg/mL, Last Rate: 200 mcg/hr (03/31/25 0334)  propofol, Last Rate: 30 mcg/kg/min (03/31/25 0609)          03/30/25  0000 03/31/25 0600   Weight: 87 kg (191 lb 12.8 oz) 89.2 kg (196 lb 10.4 oz)      Invasive Mechanical Ventilator   Settings: Observed:   Mode: VC+/AC (03/31/25 0737)    Resp Rate (Set): 15 (03/31/25 0737) Resp Rate (Observed) Vent: 14 (03/31/25 0737)   Vt (Set, mL): 520 mL (03/31/25 0737) Vt, Exp (observed, mL): 526 mL (03/31/25 0737)    Minute Ventilation (L/min) (Obs): 7.48 L/min (03/31/25 0737)    I:E Ratio (Obs): 1:3 (03/31/25 0737)       FiO2 (%): 40 % (03/31/25 0737) Plateau Pressure (cm H2O): (S) 25 cm H2O (03/31/25 0737)   PEEP/CPAP (cm H2O): 5 cm H20 (03/31/25 0737) Driving Pressure (cm H2O): 20.4 cm H2O (03/31/25 0737)    Static Compliance (L/cm H2O): 25 (03/31/25 0737)      Telemetry:  Rhythm: sinus bradycardia (03/31/25 0600)         Constitutional:  No acute distress.   Cardiovascular: RRR.    Respiratory: Normal breath sounds  (+) Rhonchi   Abdominal:  Soft with no tenderness.   Extremities: No Edema   Neurological:   Sedated.  Best Eye Response: 2-->(E2) to pain (03/31/25 0400)  Best Motor Response: 4-->(M4) withdraws from pain (03/31/25 0400)  Best Verbal Response: 1-->(V1) none  (03/31/25 0400)  Crescencio Coma Scale Score: 7 (03/31/25 0400)     Results Reviewed:  Laboratory  Microbiology  Radiology  Pathology    Hematology:  Results from last 7 days   Lab Units 03/31/25  1624 03/31/25  1035 03/31/25 0435 03/30/25  1112 03/30/25  0612 03/29/25  1708   WBC 10*3/mm3  --   --  9.22  --  8.17  8.17 6.25   HEMOGLOBIN g/dL 8.3* 8.4* 7.3*   < > 7.8*  7.8*  7.9* 6.6*   MCV fL  --   --  86.6  --  85.1  85.1 87.6   PLATELETS 10*3/mm3  --   --  223  --  221  221 254    < > = values in this interval not displayed.     Results from last 7 days   Lab Units 03/31/25 0435 03/30/25 0612 03/29/25  1708   NEUTROS ABS 10*3/mm3 6.38 6.49 4.68   LYMPHS ABS 10*3/mm3 1.24 0.63* 0.59*   EOS ABS 10*3/mm3 0.28 0.01 0.02     Chemistry:  Estimated Creatinine Clearance: 53.8 mL/min (A) (by C-G formula based on SCr of 1.68 mg/dL (H)).    Results from last 7 days   Lab Units 03/31/25 0435 03/30/25  2148   SODIUM mmol/L 129* 129*   POTASSIUM mmol/L 4.3 4.3   CHLORIDE mmol/L 95* 95*   CO2 mmol/L 20.0* 21.0*   BUN mg/dL 35* 39*   CREATININE mg/dL 1.68* 1.77*   GLUCOSE mg/dL 97 68     Results from last 7 days   Lab Units 03/31/25 0435 03/30/25  2148 03/30/25  0612   CALCIUM mg/dL 8.5* 8.6 8.9   MAGNESIUM mg/dL 2.3  --  2.2   PHOSPHORUS mg/dL 4.0  --  3.4     Hepatic Panel:  Results from last 7 days   Lab Units 03/31/25 0435 03/30/25  0612 03/29/25  1708   ALBUMIN g/dL 3.2* 3.4* 3.8   TOTAL PROTEIN g/dL 6.2 6.8 7.5   BILIRUBIN mg/dL 0.6 0.9 0.2   AST (SGOT) U/L 31 28 30   ALT (SGPT) U/L 27 26 29   ALK PHOS U/L 121* 92 102       Cardiac Labs:  Results from last 7 days   Lab Units 03/29/25  1828 03/29/25  1708   PROBNP pg/mL  --  3,216.0*   HSTROP T ng/L 100* 98*     Biomarkers:  Results from last 7 days   Lab Units 03/31/25  0435 03/29/25  1828 03/29/25  1708   CRP mg/dL 15.85*  --   --    LACTATE mmol/L  --   --  0.7   PROCALCITONIN ng/mL 1.58* 0.39*  --      U/A  Results from last 7 days   Lab Units 03/29/25 1944    COLOR UA  Yellow   CLARITY UA  Cloudy*   PH, URINE  5.5   SPECIFIC GRAVITY, URINE  1.008   GLUCOSE UA  Negative   KETONES UA  Negative   BILIRUBIN UA  Negative   PROTEIN UA  Negative   BLOOD UA  Small (1+)*   LEUKOCYTES UA  Large (3+)*   NITRITE UA  Negative   UROBILINOGEN UA  0.2 E.U./dL     Results from last 7 days   Lab Units 03/29/25  1944   RBC UA /HPF 0-2   WBC UA /HPF Too Numerous to Count*   BACTERIA UA /HPF 4+*   SQUAM EPITHEL UA /HPF None Seen   HYALINE CASTS UA /LPF None Seen       COVID-19  Lab Results   Component Value Date    COVID19 Not Detected 03/29/2025    COVID19 Not Detected 11/07/2024    COVID19 Not Detected 10/22/2024    COVID19 Detected (C) 09/11/2024       Arterial Blood Gases:  Results from last 7 days   Lab Units 03/31/25  0316 03/30/25  0435 03/30/25  0026   PH, ARTERIAL pH units 7.436 7.439 7.356   PCO2, ARTERIAL mm Hg 31.9* 32.5* 36.2   PO2 ART mm Hg 75.2* 67.5* 158.0*   FIO2 % 40 50 100       Images:  XR Chest 1 View  Result Date: 3/31/2025  1.Slight improvement in aeration of the right upper lobe. 2.Otherwise stable bilateral infiltrates and small right pleural effusion. Electronically Signed: Ricky Valverde MD  3/31/2025 4:08 AM EDT  Workstation ID: [id number]    CT Head Without Contrast  Result Date: 3/30/2025  Impression: No acute intracranial process identified. Electronically Signed: Omkar Umana MD  3/30/2025 5:28 PM EDT  Workstation ID: [id number]    XR Chest 1 View  Result Date: 3/30/2025  1.Interval worsening of bilateral infiltrates. 2.Endotracheal tube in good position. Electronically Signed: Ricky Valverde MD  3/30/2025 5:45 AM EDT  Workstation ID: [id number]    CT Head Without Contrast  Result Date: 3/30/2025  Impression: Atrophy and chronic microvascular ischemic change. No acute intracranial process. Electronically Signed: Boby Riggs MD  3/30/2025 4:21 AM EDT  Workstation ID: [id number]    XR Chest 1 View  Result Date: 3/29/2025  Impression: New endotracheal tube tip  in the mid trachea. Worsening pulmonary edema pattern and bibasilar airspace disease. Stable small bilateral pleural effusions. Electronically Signed: Princess Jeong MD  3/29/2025 11:02 PM EDT  Workstation ID: [id number]    XR Chest 1 View  Result Date: 3/29/2025  Impression: 1.Hazy bilateral airspace opacities, which could reflect pulmonary edema or pneumonia. 2.Small right pleural effusion. Electronically Signed: Omkar Umana MD  3/29/2025 5:19 PM EDT  Workstation ID: [id number]      Echo:  Results for orders placed during the hospital encounter of 11/07/24    Adult Transthoracic Echo Limited W/ Cont if Necessary Per Protocol    Interpretation Summary    Left ventricular ejection fraction appears to be 56 - 60%.    Left ventricular wall thickness is consistent with concentric hypertrophy.  moderate.  Strain is normal and in normal pattern.  GLS -24%    Left ventricular diastolic dysfunction is noted.    There is a trivial pericardial effusion.      Results: Reviewed.  I reviewed the patient's new laboratory and imaging results.  I independently reviewed the patient's new images.    Medications: Reviewed.    Assessment   A/P     Assessment/Management/Treatment Plan:    Hospital:  LOS: 2 days   ICU: 1d 10h     Active Hospital Problems    Diagnosis  POA    **Acute respiratory failure with hypoxia [J96.01]  Yes     Omkar is a 69 y.o. male, long-term care facility resident, admitted on 3/29/2025 with Acute respiratory failure with hypoxia [J96.01]    In the ED, labs significant for Hb 6.6. and sodium 120.      Family member noted patient has been having blood in stool per nursing facility.      Chest x-ray reviewed, bilateral opacities.      He was placed on BiPAP for hypoxia and work of breathing but eventually required intubation 03/29/25 @ 22:38    Comorbidities: vascular dementia, dysphagia status post PEG tube    Respiratory failure, type 1, acute.  Intubated 03/29/25   Invasive Mechanical Ventilation   UTI (Culture  03/2/25): ESBL Klebsiella pneumonia > 10^5  Cardiovascular  HFpEF  Neuro  Dementia  GI/Hepatology  Melena prior to admission, as per family report.  Chronic aspiration  PEG  Hematology  Chronic anemia  R/O Anemia Acute Blood Loss  Hyponatremia on admission.  JAYY and hyperkalemia on admission  Nutrition Support       Peptamen Intense (1.0 kcal/mL, 92 g protein/L, 4 g fiber/L)    (Based on a feeding duration of 20 h/d)    Lab Results   Component Value Date    PREALBUMIN 16.0 (L) 03/31/2025    CRP 15.85 (H) 03/31/2025     Endocrine   Body mass index is 26.93 kg/m². Overweight: 25.0-29.9kg/m2   Type 2 diabetes.    Lab Results   Lab Value Date/Time    HGBA1C 5.60 03/29/2025 1708    HGBA1C 5.60 09/15/2024 0658    HGBA1C 8.7 (H) 06/25/2022 0242    HGBA1C 13.4 04/14/2022 1058     Results from last 7 days   Lab Units 03/31/25  0537 03/30/25  2341 03/30/25  2324 03/30/25  1755 03/30/25  1735 03/30/25  1156 03/30/25  1120 03/30/25  0507   GLUCOSE mg/dL 109 152* 172* 161* 67* 152* 69* 92       Diet: NPO Diet NPO Type: Strict NPO   Advance Directives: Code Status and Medical Interventions: CPR (Attempt to Resuscitate); Full Support   Ordered at: 03/29/25 1926     Code Status (Patient has no pulse and is not breathing):    CPR (Attempt to Resuscitate)     Medical Interventions (Patient has pulse or is breathing):    Full Support        VTE Prophylaxis:  Mechanical VTE prophylaxis orders are present.         In brief:  Bronchoscopy, therapeutic today.  Not ready to be liberated from Invasive Mechanical Ventilation   Monitor Na. Up to 129  Monitor Renal function.    Lab Results   Component Value Date    CREATININE 1.68 (H) 03/31/2025    CREATININE 1.77 (H) 03/30/2025    CREATININE 1.58 (H) 03/30/2025    CREATININE 1.49 (H) 03/29/2025    CREATININE 1.54 (H) 03/29/2025    CREATININE 1.24 02/16/2025     Goal: Glucose < 180 mg/dL.   Change to MICHELLE due presence of ESBL Klebsiella in urine culture  Start Enteral Nutrition   Discussed with  Idalia (POA)  We reviewed his current condition, prognosis with Dementia, and potential need for long term ventilator. At this time she wants to see his progress -if any- over next several days, before re-addressing Code Status and Level of Support.  Disposition: Keep in ICU.    Plan of care and goals reviewed during interdisciplinary rounds.  I discussed the patient's findings and my recommendations with family and nursing staff    Time: was greater than 31 minutes. This is non-concurrent time.   (This excludes time spent performing separately reportable procedures and services).    He has a high risk of imminent or life-threatening  deterioration, which requires the highest level of physician preparedness to intervene urgently. I devoted my full attention to the direct care of this patient for the amount of time indicated above.     Time spent with family or surrogate(s) is included only if the patient was incapable of providing the necessary information or participating in medical decision making.    He has the following organ/system impairments Respiratory Failure.        [x] Primary Attending Intensive Care Medicine - Nutrition Support   [] Consultant    Copied text in this note has been reviewed and is accurate as of 03/31/25

## 2025-03-31 NOTE — PLAN OF CARE
Goal Outcome Evaluation:              Outcome Evaluation: Pt remains intubated/sedated, Peep 5, FIo2 @40, fentanyl @200, Propofol@30, H&H 6.3 transfused 1 unit PRBC q6 H&H labs.  Glucoses continued to be hypoglycemic for q6 checks D10 @ 50 started.  Left pupil remains oval in shape, No other neuro changes noted.  Pt remains in restraints.

## 2025-03-31 NOTE — NURSING NOTE
"                             Wound, Ostomy and Continence (WOC) Note    Reason for WOC Consultation: Right outer ankle and coccyx pressure injury    Patient intubated.  Family/support person at bedside.     Skin/Wound Assessment:     Wound Type: Pressure Injury Stage 2-healing  Location: Right sacrococcygeal region  Measurements: 0.5 x 0.2 x 0.2 cm  Wound Bed: dermis and pink  Wound Edges: Jacque  Periwound Skin: intact   Drainage Characteristics/Odor: none  Drainage Amount: none  Pain: No   Image:       Wound Type: Pressure Injury Unstageable  Location: Right lateral ankle  Measurements: 2 x 1 x 0.2 cm  Wound Bed: necroitc and slough  Wound Edges: Irregular and Open  Periwound Skin: intact   Drainage Characteristics/Odor: none  Drainage Amount: none  Pain: No   Care provided: The wound was cleansed with pH balanced wipes.  Thera honey sheet applied, reinforced with Allevyn dressing.  Image:       Summary and Recommendation(s):     For stage II pressure injury at sacrococcygeal region-apply Venelex twice daily.  See wound care orders.  2.   For unstageable pressure injury to right ankle-apply Thera honey sheet and change every 3 days.  See wound care orders.  3.   Turn patient every 2 hours using foam wedge and elevate heels off the bed with offloading heel boots in addition to Allevyn dressings.  4.   Refer to wound care instructions in the \"Orders\" tab  5.   Specialty support surface in place: Isolibrium       Pressure Injury Prevention Protocol (initiate for a James Scale Score of <18):     Most recent James Scale score:  Sensory Perception: 2-->very limited  Moisture: 3-->occasionally moist  Activity: 1-->bedfast  Mobility: 2-->very limited  Nutrition: 2-->probably inadequate  Friction and Shear: 1-->problem  James Score: 11 (03/31/25 0800)       -Apply Allevyn foam dressing to sacrum/coccyx and heels.     -Turn q 2 hr. using an offloading foam wedge/pillow.    -Apply moisture barrier cream to bottom BID " & PRN, if incontinent.      Thank you for consulting the WOC Nurse.  WOC Team will continue to follow.  Please re consult if the wound(s) worsens.     Sathya Garcia RN, BSN, CCRN, CWOCN  Wound, Ostomy and Continence (WOC) Department  [facility]

## 2025-03-31 NOTE — CASE MANAGEMENT/SOCIAL WORK
Discharge Planning Assessment  [facility]     Patient Name: Omkar Nava  MRN: [id number]  Today's Date: 3/31/2025    Admit Date: 3/29/2025    Plan: [facility]   Discharge Needs Assessment       Row Name 03/31/25 1332       Living Environment    People in Home facility resident    Current Living Arrangements --  [facility]    Primary Care Provided by self  [facility]    Provides Primary Care For no one, unable/limited ability to care for self    Family Caregiver if Needed child(mable), adult    Family Caregiver Names Idalia Lerma [phone]    Quality of Family Relationships supportive    Able to Return to Prior Arrangements yes       Resource/Environmental Concerns    Transportation Concerns none       Transition Planning    Patient/Family Anticipates Transition to long-term care facility       Discharge Needs Assessment    Readmission Within the Last 30 Days no previous admission in last 30 days    Equipment Currently Used at Home wheelchair                   Discharge Plan       Row Name 03/31/25 1014       Plan    Plan [facility]    Patient/Family in Agreement with Plan yes    Plan Comments Spoke with Mr. Nava's Daughter Idalia by telephone. Mr. Nava lives at [facility] facility in [facility]. Confirmed with Mariela with facility he has a bed hold. He needs assistance with ADL's and uses a walker. He does not use oxygen. He has advance directives filed. [facility]. His PCP is Alberto Dye and he has Medicare A and B and [place] Medicaid insurance. Discharge plan is to return to [facility] when medically ready. CM will continue to follow up.    Final Discharge Disposition Code 04 - [facility] care facility                       Demographic Summary       Row Name 03/31/25 5901       General Information    Admission Type inpatient    Arrived From long-[facility]    Referral Source admission list    Reason for Consult discharge planning    Preferred Language  English       Contact Information    Permission Granted to Share Info With                    Functional Status    No documentation.                  Psychosocial    No documentation.                  Abuse/Neglect    No documentation.                  Legal    No documentation.                  Substance Abuse    No documentation.                  Patient Forms    No documentation.                     Jaki Felton RN

## 2025-03-31 NOTE — PLAN OF CARE
Goal Outcome Evaluation:            Patient remains intubated and sedated. 0845 propofol d/c. patient started to fight vent. sp02 dropped to 70s. peak pressure in the 60s. Fio2 increased to 100%. lorazepam x1. Fentanyl bolus  x4. MD notified. propofol restarted  at 50. 1030 propofol d/c and versed started at 3. Currently,  Peep @ 5. Fio2 50%, fentanyl @ 200 mcg, versed at 2mg. Bronchoscopy performed. BM smears x2 with no signs of bleeding. H&H stable. . restraints remain in place.

## 2025-03-31 NOTE — PLAN OF CARE
Goal Outcome Evaluation:         Unable to decrease ventilator support d/t sedation requirement. Bedside bronchoscopy performed today by Dr. Arellano.

## 2025-04-01 NOTE — CASE MANAGEMENT/SOCIAL WORK
Continued Stay Note  [facility]     Patient Name: Omkar Nava  MRN: [id number]  Today's Date: 4/1/2025    Admit Date: 3/29/2025    Plan: [facility]   Discharge Plan       Row Name 04/01/25 1315       Plan    Plan [facility]    Patient/Family in Agreement with Plan yes    Plan Comments Discussed Mr. Nava in MDR. Patient remains on mechanical ventilation. Tube feeds initiated. Discharge plan is to return to [facility] when medically ready for discharge. CM will continue to follow up.    Final Discharge Disposition Code 04 - intermediate care facility                   Discharge Codes    No documentation.                       Jaki Felton RN

## 2025-04-01 NOTE — PLAN OF CARE
Goal Outcome Evaluation:               Unable to decrease ventilator support d/t sedation requirement. Vent settings adjusted today to achieve driving pressure </= 15 cm H20 per Dr. Arellano.

## 2025-04-01 NOTE — PLAN OF CARE
Total shift UOP 2600mL. Tube feeding increased to 55ml/hr. D10 discontinued. Remains on mechanical ventilation and sedation. Peep 5, fio2 40%, fentanyl 200 mcg, versed 3 mg. Restraints remain in place.       Problem: Adult Inpatient Plan of Care  Goal: Plan of Care Review  Outcome: Progressing  Goal: Patient-Specific Goal (Individualized)  Outcome: Progressing  Goal: Absence of Hospital-Acquired Illness or Injury  Outcome: Progressing  Intervention: Identify and Manage Fall Risk  Recent Flowsheet Documentation  Taken 4/1/2025 1800 by Mel Nicole RN  Safety Promotion/Fall Prevention:   safety round/check completed   fall prevention program maintained  Taken 4/1/2025 1600 by Mel Nicole RN  Safety Promotion/Fall Prevention:   safety round/check completed   fall prevention program maintained   clutter free environment maintained  Taken 4/1/2025 1400 by Mel Nicole RN  Safety Promotion/Fall Prevention:   safety round/check completed   fall prevention program maintained  Taken 4/1/2025 1200 by Mel Nicole RN  Safety Promotion/Fall Prevention:   activity supervised   clutter free environment maintained   fall prevention program maintained   safety round/check completed  Taken 4/1/2025 1000 by Mel Nicole RN  Safety Promotion/Fall Prevention:   activity supervised   safety round/check completed   fall prevention program maintained  Taken 4/1/2025 0800 by Mel Nicole RN  Safety Promotion/Fall Prevention:   activity supervised   safety round/check completed   fall prevention program maintained  Intervention: Prevent Skin Injury  Recent Flowsheet Documentation  Taken 4/1/2025 1800 by Mel Nicole RN  Body Position:   legs elevated   supine  Skin Protection:   transparent dressing maintained   silicone foam dressing in place   incontinence pads utilized  Taken 4/1/2025 1600 by Mel Nicole RN  Body Position:   lower extremity elevated   turned   left  Skin  Protection:   transparent dressing maintained   silicone foam dressing in place   incontinence pads utilized  Taken 4/1/2025 1400 by Mel Nicole RN  Body Position:   lower extremity elevated   right   turned  Skin Protection:   transparent dressing maintained   silicone foam dressing in place   incontinence pads utilized  Taken 4/1/2025 1200 by Mel Nicole RN  Body Position:   turned   supine   lower extremity elevated  Skin Protection:   transparent dressing maintained   incontinence pads utilized   silicone foam dressing in place   protective footwear used  Taken 4/1/2025 1000 by Mel Nicole RN  Body Position:   left   turned   lower extremity elevated  Skin Protection:   incontinence pads utilized   protective footwear used   silicone foam dressing in place  Taken 4/1/2025 0800 by Mel Nicole RN  Body Position:   turned   right   heels elevated  Skin Protection:   incontinence pads utilized   silicone foam dressing in place   protective footwear used  Intervention: Prevent and Manage VTE (Venous Thromboembolism) Risk  Recent Flowsheet Documentation  Taken 4/1/2025 1600 by Mel Nicole RN  VTE Prevention/Management: SCDs (sequential compression devices) on  Taken 4/1/2025 1200 by Mel Nicole RN  VTE Prevention/Management: SCDs (sequential compression devices) on  Taken 4/1/2025 0800 by Mel Nicole RN  VTE Prevention/Management:   SCDs (sequential compression devices) on   bilateral  Intervention: Prevent Infection  Recent Flowsheet Documentation  Taken 4/1/2025 1800 by Mel Nicole RN  Infection Prevention:   hand hygiene promoted   equipment surfaces disinfected   environmental surveillance performed   cohorting utilized  Taken 4/1/2025 1600 by Mel Nicole RN  Infection Prevention:   cohorting utilized   equipment surfaces disinfected   environmental surveillance performed   personal protective equipment utilized  Taken 4/1/2025 1400 by  Mel Nicole RN  Infection Prevention:   cohorting utilized   environmental surveillance performed   hand hygiene promoted   personal protective equipment utilized  Taken 4/1/2025 1200 by Mel Nicole RN  Infection Prevention:   personal protective equipment utilized   hand hygiene promoted   cohorting utilized   environmental surveillance performed  Taken 4/1/2025 1000 by Mel Nicole RN  Infection Prevention:   cohorting utilized   environmental surveillance performed   equipment surfaces disinfected  Taken 4/1/2025 0800 by Mel Nicole RN  Infection Prevention:   cohorting utilized   environmental surveillance performed  Goal: Optimal Comfort and Wellbeing  Outcome: Progressing  Intervention: Monitor Pain and Promote Comfort  Recent Flowsheet Documentation  Taken 4/1/2025 0800 by Mel Nicole RN  Pain Management Interventions:   care clustered   pillow support provided   position adjusted   quiet environment facilitated  Intervention: Provide Person-Centered Care  Recent Flowsheet Documentation  Taken 4/1/2025 1800 by Mel Nicole RN  Trust Relationship/Rapport: care explained  Taken 4/1/2025 1600 by Mel Nicole RN  Trust Relationship/Rapport: care explained  Taken 4/1/2025 1000 by Mel Nicole RN  Trust Relationship/Rapport: care explained  Taken 4/1/2025 0800 by Mel Nicole RN  Trust Relationship/Rapport:   care explained   reassurance provided  Goal: Readiness for Transition of Care  Outcome: Progressing     Problem: Violence Risk or Actual  Goal: Anger and Impulse Control  Outcome: Progressing  Intervention: Minimize Safety Risk  Recent Flowsheet Documentation  Taken 4/1/2025 1800 by Mel Nicole RN  De-Escalation Techniques: increased round frequency  Enhanced Safety Measures:   bed alarm set   monitored by video  Taken 4/1/2025 1600 by Mel Nicole RN  De-Escalation Techniques:   increased round frequency   medication  administered  Enhanced Safety Measures:   bed alarm set   monitored by video  Taken 4/1/2025 1400 by Mel Nicole RN  De-Escalation Techniques:   medication administered   increased round frequency  Enhanced Safety Measures:   bed alarm set   monitored by video  Taken 4/1/2025 1200 by Mel Nicole RN  De-Escalation Techniques:   medication administered   increased round frequency  Enhanced Safety Measures:   bed alarm set   monitored by video  Taken 4/1/2025 1000 by Mel Nicole RN  Behavior Management: security enhancements provided  De-Escalation Techniques:   increased round frequency   medication administered  Enhanced Safety Measures:   bed alarm set   monitored by video  Taken 4/1/2025 0800 by Mel Nicole RN  Sensory Stimulation Regulation:   care clustered   television on  De-Escalation Techniques: increased round frequency  Enhanced Safety Measures:   bed alarm set   monitored by video  Intervention: Promote Self-Control  Recent Flowsheet Documentation  Taken 4/1/2025 1000 by Mel Nicole RN  Supportive Measures: positive reinforcement provided  Environmental Support: calm environment promoted     Problem: Fall Injury Risk  Goal: Absence of Fall and Fall-Related Injury  Outcome: Progressing  Intervention: Identify and Manage Contributors  Recent Flowsheet Documentation  Taken 4/1/2025 1800 by Mel Nicole RN  Medication Review/Management: medications reviewed  Taken 4/1/2025 1600 by Mel Nicole RN  Medication Review/Management: medications reviewed  Taken 4/1/2025 1400 by Mel Nicole RN  Medication Review/Management: medications reviewed  Taken 4/1/2025 1200 by Mel Nicole RN  Medication Review/Management: medications reviewed  Taken 4/1/2025 1000 by Mel Nicole RN  Medication Review/Management: medications reviewed  Taken 4/1/2025 0800 by Mel Nicole RN  Medication Review/Management: medications reviewed  Intervention:  Promote Injury-Free Environment  Recent Flowsheet Documentation  Taken 4/1/2025 1800 by Mel Nicole RN  Safety Promotion/Fall Prevention:   safety round/check completed   fall prevention program maintained  Taken 4/1/2025 1600 by Mel Nicole RN  Safety Promotion/Fall Prevention:   safety round/check completed   fall prevention program maintained   clutter free environment maintained  Taken 4/1/2025 1400 by Mel Nicole RN  Safety Promotion/Fall Prevention:   safety round/check completed   fall prevention program maintained  Taken 4/1/2025 1200 by Mel Nicole RN  Safety Promotion/Fall Prevention:   activity supervised   clutter free environment maintained   fall prevention program maintained   safety round/check completed  Taken 4/1/2025 1000 by Mel Nicole RN  Safety Promotion/Fall Prevention:   activity supervised   safety round/check completed   fall prevention program maintained  Taken 4/1/2025 0800 by Mel Nicole RN  Safety Promotion/Fall Prevention:   activity supervised   safety round/check completed   fall prevention program maintained     Problem: Mechanical Ventilation Invasive  Goal: Effective Communication  Outcome: Progressing  Intervention: Ensure Effective Communication  Recent Flowsheet Documentation  Taken 4/1/2025 1800 by Mel Nicole RN  Trust Relationship/Rapport: care explained  Diversional Activities: television  Family/Support System Care: support provided  Taken 4/1/2025 1600 by Mel Nicole RN  Trust Relationship/Rapport: care explained  Diversional Activities: television  Taken 4/1/2025 1400 by Mel Nicole RN  Diversional Activities: television  Family/Support System Care: support provided  Taken 4/1/2025 1200 by Mel Nicole RN  Diversional Activities: television  Family/Support System Care: support provided  Taken 4/1/2025 1000 by Mel Nicole RN  Trust Relationship/Rapport: care explained  Diversional  Activities: television  Family/Support System Care: support provided  Taken 4/1/2025 0800 by Mel Nicole RN  Trust Relationship/Rapport:   care explained   reassurance provided  Diversional Activities: television  Goal: Optimal Device Function  Outcome: Progressing  Intervention: Optimize Device Care and Function  Recent Flowsheet Documentation  Taken 4/1/2025 1800 by Mel Nicole RN  Airway Safety Measures:   mask valve resuscitator at bedside   suction at bedside  Taken 4/1/2025 1600 by Mel Nicole RN  Airway/Ventilation Management: airway patency maintained  Oral Care:   swabbed with antiseptic solution   suction provided  Airway Safety Measures:   manual resuscitator/mask at bedside   suction at bedside  Taken 4/1/2025 1400 by Mel Nicole RN  Airway Safety Measures:   manual resuscitator/mask at bedside   suction at bedside  Taken 4/1/2025 1200 by Mel Nicole RN  Oral Care:   suction provided   swabbed with antiseptic solution  Airway Safety Measures:   mask valve resuscitator at bedside   suction at bedside  Taken 4/1/2025 1000 by Mel Nicole RN  Airway Safety Measures:   mask valve resuscitator at bedside   manual resuscitator/mask at bedside   suction at bedside  Taken 4/1/2025 0800 by Mel Nicole RN  Airway/Ventilation Management: airway patency maintained  Oral Care:   teeth brushed - suction toothbrush   swabbed with antiseptic solution  Airway Safety Measures:   suction at bedside   manual resuscitator/mask at bedside   mask valve resuscitator at bedside  Goal: Mechanical Ventilation Liberation  Outcome: Progressing  Intervention: Promote Extubation and Mechanical Ventilation Liberation  Recent Flowsheet Documentation  Taken 4/1/2025 1800 by Mel Nicole RN  Medication Review/Management: medications reviewed  Taken 4/1/2025 1600 by Mel Nicole RN  Medication Review/Management: medications reviewed  Taken 4/1/2025 1400 by Corey  COREEN Leal  Medication Review/Management: medications reviewed  Taken 4/1/2025 1200 by Mel Nicole RN  Medication Review/Management: medications reviewed  Taken 4/1/2025 1000 by Mel Nicole RN  Environmental Support: calm environment promoted  Medication Review/Management: medications reviewed  Taken 4/1/2025 0800 by Mel Nicole RN  Sleep/Rest Enhancement: awakenings minimized  Medication Review/Management: medications reviewed  Goal: Optimal Nutrition Delivery  Outcome: Progressing  Goal: Absence of Device-Related Skin and Tissue Injury  Outcome: Progressing  Intervention: Maintain Skin and Tissue Health  Recent Flowsheet Documentation  Taken 4/1/2025 1800 by Mel Nicole RN  Device Skin Pressure Protection:   tubing/devices free from skin contact   skin-to-skin areas padded   pressure points protected   absorbent pad utilized/changed  Taken 4/1/2025 1600 by Mel Nicole RN  Device Skin Pressure Protection:   tubing/devices free from skin contact   pressure points protected   absorbent pad utilized/changed  Taken 4/1/2025 1400 by Mel Nicole RN  Device Skin Pressure Protection:   tubing/devices free from skin contact   skin-to-skin areas padded   skin-to-device areas padded   positioning supports utilized   pressure points protected   absorbent pad utilized/changed  Taken 4/1/2025 1200 by Mel Nicole RN  Device Skin Pressure Protection:   skin-to-skin areas padded   tubing/devices free from skin contact   positioning supports utilized   pressure points protected  Taken 4/1/2025 1000 by Mel Nicole RN  Device Skin Pressure Protection:   skin-to-skin areas padded   absorbent pad utilized/changed  Taken 4/1/2025 0800 by Mel Nicole RN  Device Skin Pressure Protection:   skin-to-device areas padded   pressure points protected   absorbent pad utilized/changed  Goal: Absence of Ventilator-Induced Lung Injury  Outcome: Progressing  Intervention:  Facilitate Lung-Protection Measures  Recent Flowsheet Documentation  Taken 4/1/2025 1600 by Mel Nicole RN  Lung Protection Measures:   low inspiratory pressure provided   lung compliance monitored   ventilator synchrony promoted  Taken 4/1/2025 0800 by Mel Nicole RN  Lung Protection Measures:   lung compliance monitored   ventilator synchrony promoted  Intervention: Prevent Ventilator-Associated Pneumonia  Recent Flowsheet Documentation  Taken 4/1/2025 1800 by Mel Nicole RN  Head of Bed ([facility]) Positioning: HOB elevated  Taken 4/1/2025 1600 by Mel Nicole RN  Head of Bed ([facility]) Positioning: HOB elevated  Oral Care:   swabbed with antiseptic solution   suction provided  Taken 4/1/2025 1400 by Mel Nicole RN  Head of Bed ([facility]) Positioning: HOB elevated  Taken 4/1/2025 1200 by Mel Nicole RN  Head of Bed ([facility]) Positioning: HOB elevated  Oral Care:   suction provided   swabbed with antiseptic solution  Taken 4/1/2025 1000 by Mel Nicole RN  Head of Bed ([facility]) Positioning: HOB elevated  Taken 4/1/2025 0800 by Mel Nicole RN  Head of Bed ([facility]) Positioning: HOB elevated  VAP Prevention Contraindications: condition unstable  VAP Prevention Measures: not completed (see contraindications)  Oral Care:   teeth brushed - suction toothbrush   swabbed with antiseptic solution     Problem: Skin Injury Risk Increased  Goal: Skin Health and Integrity  Outcome: Progressing  Intervention: Optimize Skin Protection  Recent Flowsheet Documentation  Taken 4/1/2025 1800 by Mel Nicole RN  Activity Management: bedrest  Pressure Reduction Techniques:   weight shift assistance provided   pressure points protected   heels elevated off bed  Head of Bed (HOB) Positioning: HOB elevated  Pressure Reduction Devices:   pressure-redistributing mattress utilized   foam padding utilized   positioning supports utilized  Skin Protection:   transparent dressing maintained   silicone  foam dressing in place   incontinence pads utilized  Taken 4/1/2025 1600 by Mel Nicole RN  Activity Management: bedrest  Pressure Reduction Techniques:   weight shift assistance provided   pressure points protected   heels elevated off bed  Head of Bed ([facility]OB) Positioning: [facility] elevated  Pressure Reduction Devices:   pressure-redistributing mattress utilized   positioning supports utilized   foam padding utilized  Skin Protection:   transparent dressing maintained   silicone foam dressing in place   incontinence pads utilized  Taken 4/1/2025 1400 by Mel Nicole RN  Activity Management: bedrest  Pressure Reduction Techniques:   weight shift assistance provided   pressure points protected  Head of Bed ([facility]OB) Positioning: [facility] elevated  Pressure Reduction Devices:   pressure-redistributing mattress utilized   heel offloading device utilized   positioning supports utilized  Skin Protection:   transparent dressing maintained   silicone foam dressing in place   incontinence pads utilized  Taken 4/1/2025 1200 by Mel Nicole RN  Activity Management: bedrest  Pressure Reduction Techniques:   weight shift assistance provided   pressure points protected   heels elevated off bed  Head of Bed ([facility]) Positioning: [facility] elevated  Pressure Reduction Devices:   pressure-redistributing mattress utilized   heel offloading device utilized   positioning supports utilized  Skin Protection:   transparent dressing maintained   incontinence pads utilized   silicone foam dressing in place   protective footwear used  Taken 4/1/2025 1000 by Mel Nicole RN  Activity Management: bedrest  Pressure Reduction Techniques:   weight shift assistance provided   pressure points protected   positioned off wounds   heels elevated off bed  Head of Bed ([facility]) Positioning: [facility] elevated  Pressure Reduction Devices: pressure-redistributing mattress utilized  Skin Protection:   incontinence pads utilized   protective footwear used    silicone foam dressing in place  Taken 4/1/2025 0800 by Mel Nicole RN  Activity Management: bedrest  Pressure Reduction Techniques:   weight shift assistance provided   pressure points protected   heels elevated off bed  Head of Bed ([facility]) Positioning: [facility]OB elevated  Pressure Reduction Devices:   pressure-redistributing mattress utilized   foam padding utilized   heel offloading device utilized  Skin Protection:   incontinence pads utilized   silicone foam dressing in place   protective footwear used     Problem: Restraint, Nonviolent  Goal: Absence of Harm or Injury  Outcome: Progressing  Intervention: Implement Least Restrictive Safety Strategies  Recent Flowsheet Documentation  Taken 4/1/2025 1800 by Mel Nicole RN  Medical Device Protection:   tubing secured   torso covered  Diversional Activities: television  Taken 4/1/2025 1600 by Mel Nicole RN  Medical Device Protection:   tubing secured   torso covered  Diversional Activities: television  Taken 4/1/2025 1400 by Mel Nicole RN  Medical Device Protection:   tubing secured   torso covered  Diversional Activities: television  Taken 4/1/2025 1200 by Mel Nicole RN  Medical Device Protection:   tubing secured   torso covered  Diversional Activities: television  Taken 4/1/2025 1000 by Mel Nicole RN  Medical Device Protection:   tubing secured   torso covered  Diversional Activities: television  Taken 4/1/2025 0800 by Mel Nicole RN  Medical Device Protection:   tubing secured   torso covered  Diversional Activities: television  Intervention: Protect Dignity, Rights and Personal Wellbeing  Recent Flowsheet Documentation  Taken 4/1/2025 1800 by Mel Nicole, COREEN  Trust Relationship/Rapport: care explained  Taken 4/1/2025 1600 by Mel Nicole RN  Trust Relationship/Rapport: care explained  Taken 4/1/2025 1000 by Mel Nicole RN  Trust Relationship/Rapport: care explained  Taken 4/1/2025 0800  by Mel Nicole RN  Trust Relationship/Rapport:   care explained   reassurance provided  Intervention: Protect Skin and Joint Integrity  Recent Flowsheet Documentation  Taken 4/1/2025 1800 by Mel Nicole RN  Body Position:   legs elevated   supine  Skin Protection:   transparent dressing maintained   silicone foam dressing in place   incontinence pads utilized  Taken 4/1/2025 1600 by Mel Nicole RN  Body Position:   lower extremity elevated   turned   left  Skin Protection:   transparent dressing maintained   silicone foam dressing in place   incontinence pads utilized  Taken 4/1/2025 1400 by Mel Nicole RN  Body Position:   lower extremity elevated   right   turned  Skin Protection:   transparent dressing maintained   silicone foam dressing in place   incontinence pads utilized  Taken 4/1/2025 1200 by Mel Nicole RN  Body Position:   turned   supine   lower extremity elevated  Skin Protection:   transparent dressing maintained   incontinence pads utilized   silicone foam dressing in place   protective footwear used  Range of Motion: ROM (range of motion) performed  Taken 4/1/2025 1000 by Mel Nicole RN  Body Position:   left   turned   lower extremity elevated  Skin Protection:   incontinence pads utilized   protective footwear used   silicone foam dressing in place  Taken 4/1/2025 0800 by Mel Nicole RN  Body Position:   turned   right   heels elevated  Skin Protection:   incontinence pads utilized   silicone foam dressing in place   protective footwear used  Range of Motion: ROM (range of motion) performed     Problem: Comorbidity Management  Goal: Maintenance of Behavioral Health Symptom Control  Outcome: Progressing  Intervention: Maintain Behavioral Health Symptom Control  Recent Flowsheet Documentation  Taken 4/1/2025 1800 by Mel Nicole RN  Medication Review/Management: medications reviewed  Taken 4/1/2025 1600 by Mel Nicole  RN  Medication Review/Management: medications reviewed  Taken 4/1/2025 1400 by Mel Nicole RN  Medication Review/Management: medications reviewed  Taken 4/1/2025 1200 by Mel Nicole RN  Medication Review/Management: medications reviewed  Taken 4/1/2025 1000 by Mel Nicole RN  Medication Review/Management: medications reviewed  Taken 4/1/2025 0800 by Mel Nicole RN  Medication Review/Management: medications reviewed  Goal: Blood Glucose Level Within Target Range  Outcome: Progressing  Intervention: Monitor and Manage Glycemia  Recent Flowsheet Documentation  Taken 4/1/2025 1800 by Mel Nicole RN  Medication Review/Management: medications reviewed  Taken 4/1/2025 1600 by Mel Nicole RN  Medication Review/Management: medications reviewed  Taken 4/1/2025 1400 by Mel Nicole RN  Medication Review/Management: medications reviewed  Taken 4/1/2025 1200 by Mel Nicole RN  Medication Review/Management: medications reviewed  Taken 4/1/2025 1000 by Mel Nicole RN  Medication Review/Management: medications reviewed  Taken 4/1/2025 0800 by Mel Nicole RN  Medication Review/Management: medications reviewed  Goal: Maintenance of Heart Failure Symptom Control  Outcome: Progressing  Intervention: Maintain Heart Failure Management  Recent Flowsheet Documentation  Taken 4/1/2025 1800 by Mel Nicole RN  Medication Review/Management: medications reviewed  Taken 4/1/2025 1600 by Mel Nicole RN  Medication Review/Management: medications reviewed  Taken 4/1/2025 1400 by Mel Nicole RN  Medication Review/Management: medications reviewed  Taken 4/1/2025 1200 by Mel Nicole RN  Medication Review/Management: medications reviewed  Taken 4/1/2025 1000 by Mel Nicole RN  Medication Review/Management: medications reviewed  Taken 4/1/2025 0800 by Mel Nicole RN  Medication Review/Management: medications reviewed  Goal:  Blood Pressure in Desired Range  Outcome: Progressing  Intervention: Maintain Blood Pressure Management  Recent Flowsheet Documentation  Taken 4/1/2025 1800 by Mel Nicole, RN  Medication Review/Management: medications reviewed  Taken 4/1/2025 1600 by Mel Nicole, RN  Medication Review/Management: medications reviewed  Taken 4/1/2025 1400 by Mel Nicole, RN  Medication Review/Management: medications reviewed  Taken 4/1/2025 1200 by Mel Nicole, RN  Medication Review/Management: medications reviewed  Taken 4/1/2025 1000 by Mel Nicole, RN  Medication Review/Management: medications reviewed  Taken 4/1/2025 0800 by Mel Nicole, RN  Medication Review/Management: medications reviewed   Goal Outcome Evaluation:

## 2025-04-01 NOTE — PROGRESS NOTES
Clinical Nutrition     Patient Name: Omkar Nava  YOB: 1955  MRN: [id number]  Date of Encounter: 04/01/25 09:06 EDT  Admission date: 3/29/2025  Reason for Visit: Follow-up protocol, EN    Assessment   Nutrition Assessment   Admission Diagnosis:  Acute respiratory failure with hypoxia [J96.01]    Problem List:    Acute respiratory failure with hypoxia      PMH:   He  has a past medical history of Anemia, Dementia, Diabetes mellitus, Dysphagia, GERD (gastroesophageal reflux disease), History of alcohol abuse, History of cocaine use, History of marijuana use, Hypertension, Osteomyelitis, Poor historian, and Visual impairment.    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    Applicable Nutrition History:   (3/29) Intubated  (3/30) EGD - PEG replaced with Gtube  (3/31) EN initiated     Labs    Labs Reviewed: Yes    Results from last 7 days   Lab Units 04/01/25  0422 03/31/25  0435 03/30/25  2148 03/30/25  0612 03/29/25  2348 03/29/25  1708   GLUCOSE mg/dL 103* 97 68 69   < > 65   BUN mg/dL 32* 35* 39* 41*   < > 46*   CREATININE mg/dL 1.60* 1.68* 1.77* 1.58*   < > 1.54*   SODIUM mmol/L 128* 129* 129* 126*   < > 120*   CHLORIDE mmol/L 95* 95* 95* 94*   < > 91*   POTASSIUM mmol/L 4.8 4.3 4.3 5.0   < > 6.4*   PHOSPHORUS mg/dL 5.4* 4.0  --  3.4  --   --    MAGNESIUM mg/dL 2.3 2.3  --  2.2  --   --    ALT (SGPT) U/L  --  27  --  26  --  29   LACTATE mmol/L  --   --   --   --   --  0.7    < > = values in this interval not displayed.       Results from last 7 days   Lab Units 03/31/25  0435 03/30/25  0612 03/29/25  1708   ALBUMIN g/dL 3.2* 3.4* 3.8   PREALBUMIN mg/dL 16.0*  --   --    CRP mg/dL 15.85*  --   --    TRIGLYCERIDES mg/dL 84  --   --        Results  "from last 7 days   Lab Units 04/01/25  0522 03/31/25  2339 03/31/25  1734 03/31/25  1205 03/31/25  0537 03/30/25  2341   GLUCOSE mg/dL 113 126 133* 106 109 152*     Lab Results   Lab Value Date/Time    HGBA1C 5.60 03/29/2025 1708    HGBA1C 5.60 09/15/2024 0658    HGBA1C 8.7 (H) 06/25/2022 0242    HGBA1C 13.4 04/14/2022 1058         Results from last 7 days   Lab Units 03/29/25  1708   PROBNP pg/mL 3,216.0*       Medications    Medications Reviewed: yes    Scheduled Meds:castor oil-balsam peru, 1 Application, Topical, Q12H  docusate sodium, 100 mg, Per PEG Tube, BID  [Held by provider] donepezil, 10 mg, Per PEG Tube, Nightly  [Held by provider] FLUoxetine, 20 mg, Per PEG Tube, Daily  insulin regular, 2-7 Units, Subcutaneous, Q6H  meropenem, 1,000 mg, Intravenous, Q8H  [START ON 4/27/2025] mupirocin, 1 Application, Each Nare, BID  mupirocin, 1 Application, Each Nare, BID  pantoprazole, 40 mg, Intravenous, Q12H  Valproic Acid, 150 mg, Per PEG Tube, Q12H      Continuous Infusions:dextrose, 50 mL/hr, Last Rate: 50 mL/hr (03/31/25 1851)  fentanyl 10 mcg/mL,  mcg/hr, Last Rate: 200 mcg/hr (04/01/25 0013)  midazolam, 1-10 mg/hr, Last Rate: 2 mg/hr (03/31/25 1620)      PRN Meds:.  Albuterol Sulfate NEB Orderable    dextrose    fentaNYL    glucagon (human recombinant)    LORazepam    midazolam    sodium chloride    Intake/Ouptut 24 hrs (0701 - 0700)   I&O's Reviewed: yes  Intake & Output (last day)         03/31 0701 04/01 0700 04/01 0701 04/02 0700    I.V. (mL/kg) 2099.6 (23.4)     Blood      Other 140     NG/     IV Piggyback 114.9     Total Intake(mL/kg) 2688.5 (30)     Urine (mL/kg/hr) 1035 (0.5)     Stool 0     Total Output 1035     Net +1653.5           Stool Unmeasured Occurrence 2 x           Anthropometrics     Height: Height: 182 cm (71.65\")  Last Filed Weight: Weight: 89.6 kg (197 lb 8.5 oz) (04/01/25 0600)  Method: Weight Method: Bed scale  BMI: BMI (Calculated): 27    UBW:    Weight       Weight " "(kg) Weight (lbs) Weight Method Visit Report   3/31/2024 90.8 kg  200 lb 2.8 oz  Bed scale     6/6/2024 79.379 kg  175 lb      9/11/2024 79.4 kg  175 lb 0.7 oz  Estimated     9/12/2024 86.229 kg  190 lb 1.6 oz  Bed scale     10/21/2024 77.021 kg  169 lb 12.8 oz  Bed scale     11/7/2024 77 kg  169 lb 12.1 oz  Stated     11/13/2024 77.293 kg  170 lb 6.4 oz      12/2/2024 80.287 kg  177 lb   --    2/16/2025 80.287 kg  177 lb  Estimated     3/29/2025 80 kg  176 lb 5.9 oz  Estimated     3/30/2025 87 kg  191 lb 12.8 oz  Bed scale     3/31/2025 89.2 kg  196 lb 10.4 oz          Weight change: No significant changes    Nutrition Focused Physical Exam     Date: 3/31    Unable to perform due to Pt unable to participate at time of visit     Subjective   Reported/Observed/Food/Nutrition Related History:     4/1) EN initiated yesterday evening and pt tolerating. Phos and K are high, sodium low. PAB low in setting of elevated CRP. Bowels moved.    3/31) Patient intubated and sedated. Propofol currently stopped. D10 infusing. Deteriorating PEG replaced with Gtube in EGD on POA. Patient with hx dysphagia and malnutrition. Last seen here at Loc[facility] in 11/24, d/c on nocturnal EN @ 43 ml/hr X 12 hr of Novasource Renal to supplement poor PO intakes on mechanical ground, HTL diet at that time. Patient has a healing stage II PI to coccyx and unstageable PI to ankle. Patient family occupied with other providers at my visit, will f/u for full assessment.    Needs Assessment   Date: 3/31    Height used:Height: 182 cm (71.65\")  Weights used: 89 kg    Estimated Calorie needs: ~1850 Kcal/day  Method:  20-22 Kcals/KG [id number]  Method:  MSJ 1698 X 1.1 = 1868  Method:  PSU (3/31: Tmax-37.4 Ve-5.1) = 1822    Estimated Protein needs: ~98 g PRO/day with current renal fx  Method: 1-1.2g/Kg =     Estimated Fluid needs: ~ ml/day   Per clinical status    Current Nutrition Prescription     PO: NPO Diet NPO Type: Tube Feeding  Oral Nutrition Supplement: "   Intake: N/A    EN: Peptamen Intense VHP  Goal Rate: 30 ml/hr  Water Flushes: 30 ml Q 2 hr  Modular: None  Route: PEG  Tube: 20 PEG    At goal over: 20Hrs/day     Rx will supply:   Goal Volume 600 mL/day     Flush Volume 300 mL/day     Energy 600 Kcal/day 32 % Est Need   Protein 55 g/day 56 % Est Need   Fiber 2.4 g/day     Water in   mL     Total Water 804 mL     Meet DRI No        --------------------------------------------------------------------------  Product/Rate verified at bedside: Yes  Infusing Rate at time of visit: 30 ml/hr    Average Delivery from [place] Day: (started midday)  Volume 276 mL/day   % Goal Vol.   Flush Volume 120 mL/day     Energy  Kcal/day  % Est Need   Protein  g/day  % Est Need   Fiber  g/day     Water in  EN  mL     Total Water  mL     Meet DRI Yes          Assessment & Plan   Nutrition Diagnosis   Date: 3/31 Updated:   Problem Inadequate oral intake    Etiology ARF requiring intubation and mechanical ventilation, hx dysphagia   Signs/Symptoms NPO   Status: Active receiving EN    Goal:   Nutrition to support treatment and Maintain EN, Tolerate EN at goal, Deliver estimated needs    Nutrition Intervention      Follow treatment progress, Care plan reviewed, Nutrition support order placed    Continue EN per Intensivist orders- increase goal rate Peptamen VHP to 55 ml/hr. Water flushes 30 ml Q 2 hr.     Rx will supply:   Goal Volume 1100 mL/day     Flush Volume 300 mL/day     Energy 1100 Kcal/day 59 % Est Need   Protein 101 g/day 103 % Est Need   Fiber 4 g/day     Water in   mL     Total Water 1224 mL     Meet DRI No        Monitoring/Evaluation:   Per protocol, I&O, Pertinent labs, EN delivery/tolerance, Weight, GI status, Symptoms, POC/GOC, Swallow function, Hemodynamic stability    Chuyita Moreno RD, [facility]  Time Spent: 30m

## 2025-04-01 NOTE — PLAN OF CARE
Goal Outcome Evaluation:              Outcome Evaluation: Pt remains stable overnight, TF started tolerating well.  Vent settings remain unchanged, pt on fentanyl @ 200, versed 2, D10@  20.  H&H stable overnight.

## 2025-04-01 NOTE — PROGRESS NOTES
INTENSIVIST   PROGRESS NOTE        SUBJECTIVE     Omkar 69 y.o. male is followed for: Shortness of Breath       Acute respiratory failure with hypoxia    As an Intensivist, we have completed an accredited Critical Care program and maintain Board Certification and dedicate most of our professional work to critical/intensive care. We serve as the  or member of an interprofessional team, coordinating and guiding healthcare professionals to provide specialized care for critically ill patients. We manage and resuscitate patients with, or at risk for, critical illness and organ failure and initiate interventions to prevent imminent deterioration. ( Consensus Statement from the Society of Critical Care Medicine Defining Intensivist Task Force. [facility] . DOI: 10.1097/[facility].4863617028548595     Interval History:    Eyes closed.  ABG respiratory acidosis.  Not overbreathing the set rate on the ventilator.    Last Bowel Movement: 25 (25 1800)    Temp  Min: 95.4 °F (35.2 °C)  Max: 99.7 °F (37.6 °C)       History     Last Reviewed by Salo Arellano MD on 3/30/2025 at  7:11 AM    Sections Reviewed    Medical, Surgical, Family, Tobacco, Alcohol, Drug Use, Sexual Activity,   Social Documentation    Problem list reviewed by Salo Arellano MD on 3/30/2025 at  7:10 AM  Medicines reviewed by Salo Arellano MD on 3/30/2025 at  7:10 AM  Allergies reviewed by Salo Arellano MD on 3/30/2025 at  7:10 AM       The patient's relevant past medical, surgical and social history were reviewed and updated in Epic as appropriate.        OBJECTIVE     Physical Examination    Vitals:  Temp: 99.7 °F (37.6 °C) (25 0400) Temp  Min: 95.4 °F (35.2 °C)  Max: 99.7 °F (37.6 °C)   Temp core:      BP: 130/74 (25 0700) BP  Min: 95/71  Max: 166/71   MAP (non-invasive) Noninvasive MAP (mmHg): 90 (25 0700) Noninvasive MAP (mmHg)  Av  Min: 58  Max: 123   Pulse: 63 (25 0700) Pulse  Min: 44  Max: 67   Resp: 10  (04/01/25 0700) Resp  Min: 10  Max: 15   SpO2: 97 % (04/01/25 0700) SpO2  Min: 9 %  Max: 100 %   Device: ventilator (04/01/25 0700)    Flow Rate: 6 (03/29/25 1858) No data recorded     Intake/Ouptut 24 hrs (7:00AM - 6:59 AM)  Intake & Output (last 2 days)         03/30 0701 03/31 0700 03/31 0701 04/01 0700 04/01 0701 04/02 0700    I.V. (mL/kg) 1206 (13.5) 2099.6 (23.4)     Blood 349.2      Other  140     NG/ 334     IV Piggyback 225.9 114.9     Total Intake(mL/kg) 2231.1 (25) 2688.5 (30)     Urine (mL/kg/hr) 1575 (0.7) 1035 (0.5)     Stool 0 0     Total Output 1575 1035     Net +656.1 +1653.5            Stool Unmeasured Occurrence 1 x 2 x           Medications (drips):  dextrose, Last Rate: 50 mL/hr (03/31/25 1851)  fentanyl 10 mcg/mL, Last Rate: 200 mcg/hr (04/01/25 0013)  midazolam, Last Rate: 2 mg/hr (03/31/25 1620)          03/31/25 0600 04/01/25 0600   Weight: 89.2 kg (196 lb 10.4 oz) 89.6 kg (197 lb 8.5 oz)      Invasive Mechanical Ventilator   Settings: Observed:   Mode: VC+/AC (04/01/25 0413)    Resp Rate (Set): 10 (04/01/25 0413) Resp Rate (Observed) Vent: 10 (04/01/25 0700)   Vt (Set, mL): 520 mL (04/01/25 0413) Vt, Exp (observed, mL): 0 mL (04/01/25 0413)    Minute Ventilation (L/min) (Obs): 4.79 L/min (04/01/25 0413)    I:E Ratio (Obs): 4:1 (04/01/25 0413)       FiO2 (%): 50 % (04/01/25 0413) Plateau Pressure (cm H2O): (S) 37 cm H2O (03/31/25 1950)   PEEP/CPAP (cm H2O): 5 cm H20 (04/01/25 0413) Driving Pressure (cm H2O): 32.7 cm H2O (03/31/25 1950)    Static Compliance (L/cm H2O): 16 (03/31/25 1950)      Telemetry:  Rhythm: sinus bradycardia (04/01/25 0600)         Constitutional:  No acute distress.   Cardiovascular: RRR.    Respiratory: Normal breath sounds  (+) Rhonchi   Abdominal:  Soft with no tenderness.   Extremities: No Edema  Ulcer R ankle.   Neurological:   Sedated.  Best Eye Response: 3-->(E3) to speech (04/01/25 0600)  Best Motor Response: 4-->(M4) withdraws from pain (04/01/25  0600)  Best Verbal Response: 1-->(V1) none (04/01/25 0600)  Crescencio Coma Scale Score: 8 (04/01/25 0600)     Results Reviewed:  Laboratory  Microbiology  Radiology  Pathology    Hematology:  Results from last 7 days   Lab Units 04/01/25  0422 03/31/25  1624 03/31/25  1035 03/31/25  0435 03/30/25  1112 03/30/25  0612   WBC 10*3/mm3 11.25*  --   --  9.22  --  8.17  8.17   HEMOGLOBIN g/dL 8.7* 8.3* 8.4* 7.3*   < > 7.8*  7.8*  7.9*   MCV fL 88.5  --   --  86.6  --  85.1  85.1   PLATELETS 10*3/mm3 258  --   --  223  --  221  221    < > = values in this interval not displayed.     Results from last 7 days   Lab Units 03/31/25 0435 03/30/25  0612 03/29/25  1708   NEUTROS ABS 10*3/mm3 6.38 6.49 4.68   LYMPHS ABS 10*3/mm3 1.24 0.63* 0.59*   EOS ABS 10*3/mm3 0.28 0.01 0.02     Chemistry:  Estimated Creatinine Clearance: 56.8 mL/min (A) (by C-G formula based on SCr of 1.6 mg/dL (H)).    Results from last 7 days   Lab Units 04/01/25 0422 03/31/25  0435   SODIUM mmol/L 128* 129*   POTASSIUM mmol/L 4.8 4.3   CHLORIDE mmol/L 95* 95*   CO2 mmol/L 21.0* 20.0*   BUN mg/dL 32* 35*   CREATININE mg/dL 1.60* 1.68*   GLUCOSE mg/dL 103* 97     Results from last 7 days   Lab Units 04/01/25 0422 03/31/25  0435   CALCIUM mg/dL 8.6 8.5*   MAGNESIUM mg/dL 2.3 2.3   PHOSPHORUS mg/dL 5.4* 4.0     Hepatic Panel:  Results from last 7 days   Lab Units 03/31/25  0435 03/30/25  0612 03/29/25  1708   ALBUMIN g/dL 3.2* 3.4* 3.8   TOTAL PROTEIN g/dL 6.2 6.8 7.5   BILIRUBIN mg/dL 0.6 0.9 0.2   AST (SGOT) U/L 31 28 30   ALT (SGPT) U/L 27 26 29   ALK PHOS U/L 121* 92 102       Cardiac Labs:  Results from last 7 days   Lab Units 03/29/25  1828 03/29/25  1708   PROBNP pg/mL  --  3,216.0*   HSTROP T ng/L 100* 98*     Biomarkers:  Results from last 7 days   Lab Units 03/31/25  0435 03/29/25 1828 03/29/25  1708   CRP mg/dL 15.85*  --   --    LACTATE mmol/L  --   --  0.7   PROCALCITONIN ng/mL 1.58* 0.39*  --      U/A  Results from last 7 days   Lab Units  03/29/25 1944   COLOR UA  Yellow   CLARITY UA  Cloudy*   PH, URINE  5.5   SPECIFIC GRAVITY, URINE  1.008   GLUCOSE UA  Negative   KETONES UA  Negative   BILIRUBIN UA  Negative   PROTEIN UA  Negative   BLOOD UA  Small (1+)*   LEUKOCYTES UA  Large (3+)*   NITRITE UA  Negative   UROBILINOGEN UA  0.2 E.U./dL     Results from last 7 days   Lab Units 03/29/25 1944   RBC UA /HPF 0-2   WBC UA /HPF Too Numerous to Count*   BACTERIA UA /HPF 4+*   SQUAM EPITHEL UA /HPF None Seen   HYALINE CASTS UA /LPF None Seen       COVID-19  Lab Results   Component Value Date    COVID19 Not Detected 03/31/2025    COVID19 Not Detected 03/29/2025    COVID19 Not Detected 11/07/2024    COVID19 Not Detected 10/22/2024       Arterial Blood Gases:  Results from last 7 days   Lab Units 04/01/25  1306 04/01/25  0316 03/31/25  0316   PH, ARTERIAL pH units 7.355 7.293* 7.436   PCO2, ARTERIAL mm Hg 43.0 48.7* 31.9*   PO2 ART mm Hg 90.3 99.8 75.2*   FIO2 % 40 50 40       Images:  XR Chest 1 View  Result Date: 4/1/2025  1.Slight improvement in bilateral infiltrates. 2.Small bilateral pleural effusions. Electronically Signed: Ricky Valverde MD  4/1/2025 6:20 AM EDT  Workstation ID: [id number]    XR Chest 1 View  Result Date: 3/31/2025  Impression: Small bilateral pleural effusions, unchanged. Severe lower lung field airspace opacity could represent pulmonary edema, pneumonia and/or atelectasis. Electronically Signed: Rodney Ramires MD  3/31/2025 3:15 PM EDT  Workstation ID: [id number]    XR Chest 1 View  Result Date: 3/31/2025  1.Slight improvement in aeration of the right upper lobe. 2.Otherwise stable bilateral infiltrates and small right pleural effusion. Electronically Signed: Ricky Valverde MD  3/31/2025 4:08 AM EDT  Workstation ID: [id number]    CT Head Without Contrast  Result Date: 3/30/2025  Impression: No acute intracranial process identified. Electronically Signed: Omkar Umana MD  3/30/2025 5:28 PM EDT  Workstation ID:  [id number]      Echo:  Results for orders placed during the hospital encounter of 11/07/24    Adult Transthoracic Echo Limited W/ Cont if Necessary Per Protocol    Interpretation Summary    Left ventricular ejection fraction appears to be 56 - 60%.    Left ventricular wall thickness is consistent with concentric hypertrophy.  moderate.  Strain is normal and in normal pattern.  GLS -24%    Left ventricular diastolic dysfunction is noted.    There is a trivial pericardial effusion.      Results: Reviewed.  I reviewed the patient's new laboratory and imaging results.  I independently reviewed the patient's new images.    Medications: Reviewed.    Assessment   A/P     Assessment/Management/Treatment Plan:    Hospital:  LOS: 3 days   ICU: 2d 10h     Active Hospital Problems    Diagnosis  POA    **Acute respiratory failure with hypoxia [J96.01]  Yes     Omkar is a 69 y.o. male, long-term care facility resident, admitted on 3/29/2025 with Acute respiratory failure with hypoxia [J96.01]    In the ED, labs significant for Hb 6.6. and sodium 120.      Family member noted patient has been having blood in stool per nursing facility.      Chest x-ray reviewed, bilateral opacities.      He was placed on BiPAP for hypoxia and work of breathing but eventually required intubation 03/29/25 @ 22:38    Comorbidities: vascular dementia, dysphagia status post PEG tube    Respiratory failure, type 1, acute.  Intubated 03/29/25   Invasive Mechanical Ventilation   Bronch: 03/31/25L Coronavirus 229E  UTI (Culture 03/2/25): ESBL Klebsiella pneumonia > 10^5  Cardiovascular  HFpEF  Neuro  Dementia  GI/Hepatology  Melena prior to admission, as per family report.  Chronic aspiration  PEG  Hematology  Chronic anemia  R/O Anemia Acute Blood Loss  Hyponatremia on admission.  JAYY and hyperkalemia on admission  Nutrition Support    Peptamen Intense (1.0 kcal/mL, 92 g protein/L, 4 g fiber/L)  Gastrostomy/Enterostomy Percutaneous endoscopic gastrostomy  (PEG) 1 20 Fr. LUQ-Tube Feeding Rate (mL/hr): 30 mL/HR (Based on a feeding duration of 20 h/d)    Lab Results   Component Value Date    PREALBUMIN 16.0 (L) 03/31/2025    CRP 15.85 (H) 03/31/2025     Endocrine   Body mass index is 27.05 kg/m². Overweight: 25.0-29.9kg/m2   Type 2 diabetes.    Lab Results   Lab Value Date/Time    HGBA1C 5.60 03/29/2025 1708    HGBA1C 5.60 09/15/2024 0658    HGBA1C 8.7 (H) 06/25/2022 0242    HGBA1C 13.4 04/14/2022 1058     Results from last 7 days   Lab Units 04/01/25  0522 03/31/25  2339 03/31/25  1734 03/31/25  1205 03/31/25  0537 03/30/25  2341 03/30/25  2324 03/30/25  1755   GLUCOSE mg/dL 113 126 133* 106 109 152* 172* 161*       Diet: NPO Diet NPO Type: Tube Feeding   Advance Directives: Code Status and Medical Interventions: CPR (Attempt to Resuscitate); Full Support   Ordered at: 03/29/25 1926     Code Status (Patient has no pulse and is not breathing):    CPR (Attempt to Resuscitate)     Medical Interventions (Patient has pulse or is breathing):    Full Support        VTE Prophylaxis:  Mechanical VTE prophylaxis orders are present.         In brief:  Adjust Vent settings. Lung protective, mechanical ventilation.  Target Driving Pressure <= 15 cm H2O   Not ready to be liberated from Invasive Mechanical Ventilation   Monitor Na. Stable at 128  F/U Bronch (03/31/25) results.  Monitor Renal function.    Lab Results   Component Value Date    CREATININE 1.60 (H) 04/01/2025    CREATININE 1.68 (H) 03/31/2025    CREATININE 1.77 (H) 03/30/2025    CREATININE 1.58 (H) 03/30/2025    CREATININE 1.49 (H) 03/29/2025    CREATININE 1.54 (H) 03/29/2025     Goal: Glucose < 180 mg/dL.   Continue MICHELLE due presence of ESBL Klebsiella in urine culture  Advance Enteral Nutrition   Discussed with Idalia JUAREZ) on 03/31/25  We reviewed his current condition, prognosis with Dementia, and potential need for long term ventilator. At this time she wants to see his progress -if any- over next several days,  before re-addressing Code Status and Level of Support.  Disposition: Keep in ICU.    Plan of care and goals reviewed during interdisciplinary rounds.  I discussed the patient's findings and my recommendations with family and nursing staff    Time: was greater than 32 minutes. This is non-concurrent time.   (This excludes time spent performing separately reportable procedures and services).    He has a high risk of imminent or life-threatening  deterioration, which requires the highest level of physician preparedness to intervene urgently. I devoted my full attention to the direct care of this patient for the amount of time indicated above.     Time spent with family or surrogate(s) is included only if the patient was incapable of providing the necessary information or participating in medical decision making.    He has the following organ/system impairments Respiratory Failure.        [x] Primary Attending Intensive Care Medicine - Nutrition Support   [] Consultant    Copied text in this note has been reviewed and is accurate as of 04/01/25

## 2025-04-02 NOTE — PLAN OF CARE
Goal Outcome Evaluation:      TF increased to 75 ml/hr   UOP approx 1300 ml   Remains intubated, does not follow command, agitated with care. Continue Fentanyl and Versed gtt. PRN boluses utilized.   BM x 2, afebrile.   BUE restraints continued   Nightly HTN medication ordered.   Family updated.

## 2025-04-02 NOTE — PROGRESS NOTES
INTENSIVIST   PROGRESS NOTE        SUBJECTIVE     Omkar 69 y.o. male is followed for: Shortness of Breath       Acute respiratory failure with hypoxia    As an Intensivist, we have completed an accredited Critical Care program and maintain Board Certification and dedicate most of our professional work to critical/intensive care. We serve as the  or member of an interprofessional team, coordinating and guiding healthcare professionals to provide specialized care for critically ill patients. We manage and resuscitate patients with, or at risk for, critical illness and organ failure and initiate interventions to prevent imminent deterioration. ( Consensus Statement from the Society of Critical Care Medicine Defining Intensivist Task Force. [facility] . DOI: 10.1097/[facility].3920513972546651     Interval History:    He is not following commands.    Sedated.    Last Bowel Movement: 25 (25)    Temp  Min: 97.8 °F (36.6 °C)  Max: 98.6 °F (37 °C)       History     Last Reviewed by Salo Arellano MD on 3/30/2025 at  7:11 AM    Sections Reviewed    Medical, Surgical, Family, Tobacco, Alcohol, Drug Use, Sexual Activity,   Social Documentation    Problem list reviewed by Salo Arellano MD on 3/30/2025 at  7:10 AM  Medicines reviewed by Salo Arellano MD on 3/30/2025 at  7:10 AM  Allergies reviewed by Salo Arellano MD on 3/30/2025 at  7:10 AM       The patient's relevant past medical, surgical and social history were reviewed and updated in Epic as appropriate.        OBJECTIVE     Physical Examination    Vitals:  Temp: 98.6 °F (37 °C) (25 0800) Temp  Min: 97.8 °F (36.6 °C)  Max: 98.6 °F (37 °C)   Temp core:      BP: 172/70 (25 08) BP  Min: 123/65  Max: 177/67   MAP (non-invasive) Noninvasive MAP (mmHg): 100 (25) Noninvasive MAP (mmHg)  Av.8  Min: 58  Max: 123   Pulse: (!) 45 (25) Pulse  Min: 45  Max: 69   Resp: 15 (25) Resp  Min: 15  Max: 15   SpO2:  98 % (04/02/25 0800) SpO2  Min: 93 %  Max: 98 %   Device: ventilator (04/02/25 0600)    Flow Rate: 6 (03/29/25 1768) No data recorded     Intake/Ouptut 24 hrs (7:00AM - 6:59 AM)  Intake & Output (last 2 days)         03/31 0701 04/01 0700 04/01 0701 04/02 0700 04/02 0701 04/03 0700    I.V. (mL/kg) 2099.6 (23.4) 1241.5 (13.9) 91.2 (1)    Blood       Other 140 330 100    NG/ 873 92    IV Piggyback 114.9 230.1     Total Intake(mL/kg) 2688.5 (30) 2674.6 (29.9) 283.2 (3.2)    Urine (mL/kg/hr) 1035 (0.5) 5650 (2.6) 350 (2.7)    Stool 0 0     Total Output 1035 5650 350    Net +1653.5 -2975.4 -66.9           Stool Unmeasured Occurrence 2 x 2 x           Medications (drips):  fentanyl 10 mcg/mL, Last Rate: 200 mcg/hr (04/02/25 0134)  midazolam, Last Rate: 3 mg/hr (04/01/25 1759)          03/31/25 0600 04/01/25 0600   Weight: 89.2 kg (196 lb 10.4 oz) 89.6 kg (197 lb 8.5 oz)      Invasive Mechanical Ventilator   Settings: Observed:   Mode: VC+/AC (04/02/25 0444)    Resp Rate (Set): 15 (04/02/25 0444) Resp Rate (Observed) Vent: 15 (04/02/25 0800)   Vt (Set, mL): 470 mL (04/02/25 0444) Vt, Exp (observed, mL): 409 mL (04/02/25 0444)    Minute Ventilation (L/min) (Obs): 5.75 L/min (04/02/25 0444)    I:E Ratio (Obs): 1:3 (04/02/25 0444)       FiO2 (%): 40 % (04/02/25 0444) Plateau Pressure (cm H2O): (S) 24 cm H2O (04/01/25 2110)   PEEP/CPAP (cm H2O): 5 cm H20 (04/02/25 0444) Driving Pressure (cm H2O): 19.8 cm H2O (04/01/25 2110)    Static Compliance (L/cm H2O): 24 (04/01/25 2110)      Telemetry:  Rhythm: sinus bradycardia (04/02/25 0600)         Constitutional:  No acute distress.   Cardiovascular: RRR.    Respiratory: Normal breath sounds  (+) Rhonchi   Abdominal:  Soft with no tenderness.   Extremities: No Edema  Ulcer R ankle.   Neurological:   Sedated.  Best Eye Response: 2-->(E2) to pain (04/02/25 0600)  Best Motor Response: 2-->(M2) extension to pain (04/02/25 0600)  Best Verbal Response: 1-->(V1) none (04/02/25  0600)  [place] Coma Scale Score: 5 (04/02/25 0600)     Results Reviewed:  Laboratory  Microbiology  Radiology  Pathology    Hematology:  Results from last 7 days   Lab Units 04/02/25 0458 04/01/25  0422 03/31/25  1624 03/31/25  1035 03/31/25 0435   WBC 10*3/mm3 8.21 11.25*  --   --  9.22   HEMOGLOBIN g/dL 9.1* 8.7* 8.3*   < > 7.3*   MCV fL 88.8 88.5  --   --  86.6   PLATELETS 10*3/mm3 258 258  --   --  223    < > = values in this interval not displayed.     Results from last 7 days   Lab Units 04/02/25 0458 03/31/25 0435 03/30/25  0612   NEUTROS ABS 10*3/mm3 5.88 6.38 6.49   LYMPHS ABS 10*3/mm3 1.03 1.24 0.63*   EOS ABS 10*3/mm3 0.33 0.28 0.01     Chemistry:  Estimated Creatinine Clearance: 71 mL/min (A) (by C-G formula based on SCr of 1.28 mg/dL (H)).    Results from last 7 days   Lab Units 04/02/25 0458 04/01/25  0422   SODIUM mmol/L 138 128*   POTASSIUM mmol/L 4.1 4.8   CHLORIDE mmol/L 106 95*   CO2 mmol/L 22.0 21.0*   BUN mg/dL 26* 32*   CREATININE mg/dL 1.28* 1.60*   GLUCOSE mg/dL 93 103*     Results from last 7 days   Lab Units 04/02/25 0458 04/01/25  0422   CALCIUM mg/dL 8.9 8.6   MAGNESIUM mg/dL 2.3 2.3   PHOSPHORUS mg/dL 2.5 5.4*     Hepatic Panel:  Results from last 7 days   Lab Units 03/31/25  0435 03/30/25  0612 03/29/25  1708   ALBUMIN g/dL 3.2* 3.4* 3.8   TOTAL PROTEIN g/dL 6.2 6.8 7.5   BILIRUBIN mg/dL 0.6 0.9 0.2   AST (SGOT) U/L 31 28 30   ALT (SGPT) U/L 27 26 29   ALK PHOS U/L 121* 92 102       Cardiac Labs:  Results from last 7 days   Lab Units 03/29/25  1828 03/29/25  1708   PROBNP pg/mL  --  3,216.0*   HSTROP T ng/L 100* 98*     Biomarkers:  Results from last 7 days   Lab Units 03/31/25  0435 03/29/25  1828 03/29/25  1708   CRP mg/dL 15.85*  --   --    LACTATE mmol/L  --   --  0.7   PROCALCITONIN ng/mL 1.58* 0.39*  --      U/A  Results from last 7 days   Lab Units 03/29/25  1944   COLOR UA  Yellow   CLARITY UA  Cloudy*   PH, URINE  5.5   SPECIFIC GRAVITY, URINE  1.008   GLUCOSE UA   Negative   KETONES UA  Negative   BILIRUBIN UA  Negative   PROTEIN UA  Negative   BLOOD UA  Small (1+)*   LEUKOCYTES UA  Large (3+)*   NITRITE UA  Negative   UROBILINOGEN UA  0.2 E.U./dL     Results from last 7 days   Lab Units 03/29/25  1944   RBC UA /HPF 0-2   WBC UA /HPF Too Numerous to Count*   BACTERIA UA /HPF 4+*   SQUAM EPITHEL UA /HPF None Seen   HYALINE CASTS UA /LPF None Seen       COVID-19  Lab Results   Component Value Date    COVID19 Not Detected 03/31/2025    COVID19 Not Detected 03/29/2025    COVID19 Not Detected 11/07/2024    COVID19 Not Detected 10/22/2024       Arterial Blood Gases:  Results from last 7 days   Lab Units 04/02/25  0330 04/01/25  1306 04/01/25  0316   PH, ARTERIAL pH units 7.479* 7.355 7.293*   PCO2, ARTERIAL mm Hg 32.6* 43.0 48.7*   PO2 ART mm Hg 81.7* 90.3 99.8   FIO2 % 40 40 50       Images:  XR Chest 1 View  Result Date: 4/2/2025  Slight interval worsening of right-sided infiltrates. Stable left-sided infiltrates. Electronically Signed: Ricky Valverde MD  4/2/2025 5:49 AM EDT  Workstation ID: [id number]    XR Chest 1 View  Result Date: 4/1/2025  1.Slight improvement in bilateral infiltrates. 2.Small bilateral pleural effusions. Electronically Signed: Ricky Valverde MD  4/1/2025 6:20 AM EDT  Workstation ID: [id number]    XR Chest 1 View  Result Date: 3/31/2025  Impression: Small bilateral pleural effusions, unchanged. Severe lower lung field airspace opacity could represent pulmonary edema, pneumonia and/or atelectasis. Electronically Signed: Rodney Ramires MD  3/31/2025 3:15 PM EDT  Workstation ID: [id number]      Echo:  Results for orders placed during the hospital encounter of 11/07/24    Adult Transthoracic Echo Limited W/ Cont if Necessary Per Protocol    Interpretation Summary    Left ventricular ejection fraction appears to be 56 - 60%.    Left ventricular wall thickness is consistent with concentric hypertrophy.  moderate.  Strain is normal and in normal pattern.  GLS -24%     Left ventricular diastolic dysfunction is noted.    There is a trivial pericardial effusion.      Results: Reviewed.  I reviewed the patient's new laboratory and imaging results.  I independently reviewed the patient's new images.    Medications: Reviewed.    Assessment   A/P     Assessment/Management/Treatment Plan:    Hospital:  LOS: 4 days   ICU: 3d 11h     Active Hospital Problems    Diagnosis  POA    **Acute respiratory failure with hypoxia [J96.01]  Yes     Omkar is a 69 y.o. male, long-term care facility resident, admitted on 3/29/2025 with Acute respiratory failure with hypoxia [J96.01]    In the ED, labs significant for Hb 6.6. and sodium 120.      Family member noted patient has been having blood in stool per nursing facility.      Chest x-ray reviewed, bilateral opacities.      He was placed on BiPAP for hypoxia and work of breathing but eventually required intubation 03/29/25 @ 22:38    Comorbidities: vascular dementia, dysphagia status post PEG tube    Respiratory failure, type 1, acute.  Intubated 03/29/25   Invasive Mechanical Ventilation   Bronch: 03/31/25L Coronavirus 229E  UTI (Culture 03/2/25): ESBL Klebsiella pneumonia > 10^5  Cardiovascular  HFpEF  Neuro  Dementia  GI/Hepatology  Melena prior to admission, as per family report.  Chronic aspiration  PEG  Hematology  Chronic anemia  R/O Anemia Acute Blood Loss  Hyponatremia on admission.  JAYY and hyperkalemia on admission  Nutrition Support    Peptamen Intense (1.0 kcal/mL, 92 g protein/L, 4 g fiber/L)  Gastrostomy/Enterostomy Percutaneous endoscopic gastrostomy (PEG) 1 20 Fr. LUQ-Tube Feeding Rate (mL/hr): 55 mL/HR (Based on a feeding duration of 20 h/d)    Lab Results   Component Value Date    PREALBUMIN 16.0 (L) 03/31/2025    CRP 15.85 (H) 03/31/2025     Endocrine   Body mass index is 27.05 kg/m². Overweight: 25.0-29.9kg/m2   Type 2 diabetes.    Lab Results   Lab Value Date/Time    HGBA1C 5.60 03/29/2025 1708    HGBA1C 5.60 09/15/2024 0658     HGBA1C 8.7 (H) 06/25/2022 0242    HGBA1C 13.4 04/14/2022 1058     Results from last 7 days   Lab Units 04/02/25  0625 04/01/25  2327 04/01/25  1744 04/01/25  1210 04/01/25  0522 03/31/25  2339 03/31/25  1734 03/31/25  1205   GLUCOSE mg/dL 116 95 85 110 113 126 133* 106       Diet: NPO Diet NPO Type: Tube Feeding   Advance Directives: Code Status and Medical Interventions: CPR (Attempt to Resuscitate); Full Support   Ordered at: 03/29/25 1926     Code Status (Patient has no pulse and is not breathing):    CPR (Attempt to Resuscitate)     Medical Interventions (Patient has pulse or is breathing):    Full Support        VTE Prophylaxis:  Mechanical VTE prophylaxis orders are present.         In brief:  Adjust Vent settings. Lung protective, mechanical ventilation.  Decrease VE   since ABG showed respiratory alkalosis  Target Driving Pressure <= 15 cm H2O   Not ready to be liberated from Invasive Mechanical Ventilation   Hyponatremia resolved.  Bronch (03/31/25) Routine Cx: No growth.  Resume some of his BP medications.  Monitor Renal function. SCr improving.    Lab Results   Component Value Date    CREATININE 1.28 (H) 04/02/2025    CREATININE 1.60 (H) 04/01/2025    CREATININE 1.68 (H) 03/31/2025    CREATININE 1.77 (H) 03/30/2025    CREATININE 1.58 (H) 03/30/2025    CREATININE 1.49 (H) 03/29/2025     Goal: Glucose < 180 mg/dL.   Continue MICHELLE due presence of ESBL Klebsiella in urine culture  Advance Enteral Nutrition to 1500 mL/d  Discussed with Idalia JUAREZ) on 03/31/25  We reviewed his current condition, prognosis with Dementia, and potential need for long term ventilator. At this time she wants to see his progress -if any- over next several days, before re-addressing Code Status and Level of Support.  Disposition: Keep in ICU.    Plan of care and goals reviewed during interdisciplinary rounds.  I discussed the patient's findings and my recommendations with family and nursing staff    Time: was greater than 33 minutes.  This is non-concurrent time.   (This excludes time spent performing separately reportable procedures and services).    He has a high risk of imminent or life-threatening  deterioration, which requires the highest level of physician preparedness to intervene urgently. I devoted my full attention to the direct care of this patient for the amount of time indicated above.     Time spent with family or surrogate(s) is included only if the patient was incapable of providing the necessary information or participating in medical decision making.    He has the following organ/system impairments Respiratory Failure.        [x] Primary Attending Intensive Care Medicine - Nutrition Support   [] Consultant    Copied text in this note has been reviewed and is accurate as of 04/02/25

## 2025-04-02 NOTE — PLAN OF CARE
Goal Outcome Evaluation:      Unable to decrease ventilator support d/t sedation requirements.

## 2025-04-02 NOTE — PLAN OF CARE
Goal Outcome Evaluation:  Plan of Care Reviewed With: patient  Progress: no change    - Ventilator settings unchanged.    - Patient status remains unchanged.    - Fentanyl @ 200, Versed @ 2.    - Tube feed at goal rate of 55ml/hr. Tolerating well.    - Urine output for 12h: 3,050ml.    - One bowel movement.

## 2025-04-03 NOTE — PLAN OF CARE
Goal Outcome Evaluation:           Progress: no change  Outcome Evaluation: Vital signs relatively stable; afebrile. Blood pressure elevated prior to oral medications administered. Fentanyl and Versed continue for sedation and ventilator compliance. Patient remains rigid with stimulation with no purposeful movements, only withdraw with upper extremities and head. Urine output has been poor through the night; Story catheter changed after resistance met with irrigation attempt, but no increase in urine output was noted. Tube feeding continues per PEG tube at goal rate.

## 2025-04-03 NOTE — CASE MANAGEMENT/SOCIAL WORK
Continued Stay Note  [facility]     Patient Name: Omkar Nava  MRN: [id number]  Today's Date: 4/3/2025    Admit Date: 3/29/2025    Plan: [facility]   Discharge Plan       Row Name 04/03/25 1124       Plan    Plan [facility]    Patient/Family in Agreement with Plan yes    Plan Comments Discussed Mr. Nava in MDR. Patient remains on mechanical ventilation. Chest CT ordered today and tube feeds at Goal rate through PEG tube. Discharge plan is to return to [facility] once medically ready for discharge. CM will continue to follow up.    Final Discharge Disposition Code 04 - intermediate care facility                   Discharge Codes    No documentation.                       Jaki Felton RN

## 2025-04-03 NOTE — PROGRESS NOTES
Clinical Nutrition     Patient Name: Omkar Nava  YOB: 1955  MRN: [id number]  Date of Encounter: 04/03/25 13:17 EDT  Admission date: 3/29/2025  Reason for Visit: Follow-up protocol, EN    Assessment   Nutrition Assessment   Admission Diagnosis:  Acute respiratory failure with hypoxia [J96.01]    Problem List:    Acute respiratory failure with hypoxia      PMH:   He  has a past medical history of Anemia, Dementia, Diabetes mellitus, Dysphagia, GERD (gastroesophageal reflux disease), History of alcohol abuse, History of cocaine use, History of marijuana use, Hypertension, Osteomyelitis, Poor historian, and Visual impairment.    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    Applicable Nutrition History:   (3/29) Intubated  (3/30) EGD - PEG replaced with Gtube  (3/31) EN initiated     Labs    Labs Reviewed: Yes    Results from last 7 days   Lab Units 04/03/25  0405 04/02/25  0458 04/01/25  0422 03/31/25  0435 03/30/25  2148 03/30/25  0612 03/29/25  2348 03/29/25  1708   GLUCOSE mg/dL 149* 93 103* 97   < > 69   < > 65   BUN mg/dL 36* 26* 32* 35*   < > 41*   < > 46*   CREATININE mg/dL 1.38* 1.28* 1.60* 1.68*   < > 1.58*   < > 1.54*   SODIUM mmol/L 140 138 128* 129*   < > 126*   < > 120*   CHLORIDE mmol/L 108* 106 95* 95*   < > 94*   < > 91*   POTASSIUM mmol/L 4.9 4.1 4.8 4.3   < > 5.0   < > 6.4*   PHOSPHORUS mg/dL 3.5 2.5 5.4* 4.0  --  3.4   < >  --    MAGNESIUM mg/dL 2.4 2.3 2.3 2.3  --  2.2   < >  --    ALT (SGPT) U/L  --   --   --  27  --  26  --  29   LACTATE mmol/L  --   --   --   --   --   --   --  0.7    < > = values in this interval not displayed.       Results from last 7 days   Lab Units 03/31/25  0435 03/30/25  0612 03/29/25  4698   ALBUMIN  g/dL 3.2* 3.4* 3.8   PREALBUMIN mg/dL 16.0*  --   --    CRP mg/dL 15.85*  --   --    TRIGLYCERIDES mg/dL 84  --   --        Results from last 7 days   Lab Units 04/03/25  1148 04/03/25  0528 04/02/25  2322 04/02/25  1745 04/02/25  1142 04/02/25  0625   GLUCOSE mg/dL 121 125 129 110 99 116     Lab Results   Lab Value Date/Time    HGBA1C 5.60 03/29/2025 1708    HGBA1C 5.60 09/15/2024 0658    HGBA1C 8.7 (H) 06/25/2022 0242    HGBA1C 13.4 04/14/2022 1058         Results from last 7 days   Lab Units 03/29/25  1708   PROBNP pg/mL 3,216.0*       Medications    Medications Reviewed: yes    Scheduled Meds:albumin human, 25 g, Intravenous, Q30 Min  amLODIPine, 10 mg, Per G Tube, Q24H  bumetanide, 2 mg, Intravenous, Q4H  castor oil-balsam peru, 1 Application, Topical, Q12H  chlorothiazide (DIURIL) 500 mg in sterile water (preservative free) 18 mL injection, 500 mg, Intravenous, Once  cloNIDine, 0.2 mg, Per G Tube, Q8H  docusate sodium, 100 mg, Per PEG Tube, BID  heparin (porcine), 5,000 Units, Subcutaneous, Q8H  hydrALAZINE, 100 mg, Per G Tube, TID  insulin regular, 2-7 Units, Subcutaneous, Q6H  meropenem, 1,000 mg, Intravenous, Q8H  metOLazone, 10 mg, Oral, Once  [START ON 4/27/2025] mupirocin, 1 Application, Each Nare, BID  pantoprazole, 40 mg, Intravenous, Q12H  terazosin, 2 mg, Per PEG Tube, Nightly  [Held by provider] Valproic Acid, 150 mg, Per PEG Tube, Q12H      Continuous Infusions:fentanyl 10 mcg/mL,  mcg/hr, Last Rate: 200 mcg/hr (04/03/25 0302)  midazolam, 1-5 mg/hr, Last Rate: 2 mg/hr (04/03/25 1254)      PRN Meds:.  Albuterol Sulfate NEB Orderable    dextrose    fentaNYL    glucagon (human recombinant)    midazolam    sodium chloride    Intake/Ouptut 24 hrs (0701 - 0700)   I&O's Reviewed: yes  Intake & Output (last day)         04/02 0701 04/03 0700 04/03 0701 04/04 0700    I.V. (mL/kg) 765.9 (8.5) 160.4 (1.8)    Other 484 40    NG/GT 1432 305    IV Piggyback 100 100    Total Intake(mL/kg) 2781.9 (31)  "605.4 (6.8)    Urine (mL/kg/hr) 1545 (0.7) 58 (0.1)    Stool 0     Total Output 1545 58    Net +1236.9 +547.4          Stool Unmeasured Occurrence 2 x           Anthropometrics     Height: Height: 182 cm (71.65\")  Last Filed Weight: Weight: 89.6 kg (197 lb 8.5 oz) (04/01/25 0600)  Method: Weight Method: Bed scale  BMI: BMI (Calculated): 27    UBW:    Weight       Weight (kg) Weight (lbs) Weight Method Visit Report   3/31/2024 90.8 kg  200 lb 2.8 oz  Bed scale     6/6/2024 79.379 kg  175 lb      9/11/2024 79.4 kg  175 lb 0.7 oz  Estimated     9/12/2024 86.229 kg  190 lb 1.6 oz  Bed scale     10/21/2024 77.021 kg  169 lb 12.8 oz  Bed scale     11/7/2024 77 kg  169 lb 12.1 oz  Stated     11/13/2024 77.293 kg  170 lb 6.4 oz      12/2/2024 80.287 kg  177 lb   --    2/16/2025 80.287 kg  177 lb  Estimated     3/29/2025 80 kg  176 lb 5.9 oz  Estimated     3/30/2025 87 kg  191 lb 12.8 oz  Bed scale     3/31/2025 89.2 kg  196 lb 10.4 oz          Weight change: No significant changes    Nutrition Focused Physical Exam     Date: 3/31    Unable to perform due to Pt unable to participate at time of visit     Subjective   Reported/Observed/Food/Nutrition Related History:   4/2) Remains intubated and sedated.  Plan for CT chest without contrast today.  EN infusing @ goal rate and being tolerated. + 2 BM yesterday. Electrolytes stable so far.      4/1) EN initiated yesterday evening and pt tolerating. Phos and K are high, sodium low. PAB low in setting of elevated CRP. Bowels moved.    3/31) Patient intubated and sedated. Propofol currently stopped. D10 infusing. Deteriorating PEG replaced with Gtube in EGD on POA. Patient with hx dysphagia and malnutrition. Last seen here at [facility] in 11/24, d/c on nocturnal EN @ 43 ml/hr X 12 hr of Novasource Renal to supplement poor PO intakes on mechanical ground, HTL diet at that time. Patient has a healing stage II PI to coccyx and unstageable PI to ankle. Patient family occupied with other " "providers at my visit, will f/u for full assessment.    Needs Assessment   Date: 3/31    Height used:Height: 182 cm (71.65\")  Weights used: 89 kg    Estimated Calorie needs: ~1850 Kcal/day  Method:  20-22 Kcals/KG 8249-3071  Method:  MSJ 1698 X 1.1 = 1868  Method:  PSU (3/31: Tmax-37.4 Ve-5.1) = 1822    Estimated Protein needs: ~98 g PRO/day with current renal fx  Method: 1-1.2g/Kg =     Estimated Fluid needs: ~ ml/day   Per clinical status    Current Nutrition Prescription     PO: NPO Diet NPO Type: Tube Feeding  Oral Nutrition Supplement:   Intake: N/A    EN: Peptamen Intense VHP  Goal Rate: 75 ml/hr  Water Flushes: 30 ml Q 2 hr  Modular: None  Route: PEG  Tube: 20 PEG    At goal over: 20Hrs/day     Rx will supply:   Goal Volume 1500 mL/day     Flush Volume 300 mL/day     Energy 1500 Kcal/day 81 % Est Need   Protein 138 g/day 141 % Est Need   Fiber 6.6 g/day     Water in  EN 1260 mL     Total Water 1560 mL     Meet DRI No        --------------------------------------------------------------------------  Product/Rate verified at bedside: Yes  Infusing Rate at time of visit: per RN; patient not available at time of attempted visit.     Average Delivery from [place] Days: EN being advanced   Volume 1050 mL/day 70  % Goal Vol.   Flush Volume  mL/day     Energy  Kcal/day  % Est Need   Protein  g/day  % Est Need   Fiber  g/day     Water in  EN  mL     Total Water  mL     Meet DRI Yes        Assessment & Plan   Nutrition Diagnosis   Date: 3/31 Updated:   Problem Inadequate oral intake    Etiology ARF requiring intubation and mechanical ventilation, hx dysphagia   Signs/Symptoms NPO   Status: Active receiving EN    Goal:   Nutrition to support treatment and Maintain EN, Tolerate EN at goal, Deliver estimated needs    Nutrition Intervention      Follow treatment progress, Care plan reviewed, Nutrition support order placed    To better meet needs suggest change tube feeding to Peptamen AF @ goal rate 75ml/hr.  "     Rx will supply:   Goal Volume 1500 mL/day     Flush Volume 300 mL/day     Energy 1800 Kcal/day 97 % Est Need   Protein 114 g/day 116 % Est Need   Fiber 9.0 g/day     Water in  EN 1217 mL     Total Water 1517 mL     Meet DRI No        Monitoring/Evaluation:   Per protocol, I&O, Pertinent labs, EN delivery/tolerance, Weight, GI status, Symptoms, POC/GOC, Swallow function, Hemodynamic stability    Trinidad Wilder RD, CNSC  Time Spent: 30m

## 2025-04-03 NOTE — PROGRESS NOTES
INTENSIVIST   PROGRESS NOTE        SUBJECTIVE     Omkar 69 y.o. male is followed for: Shortness of Breath       Acute respiratory failure with hypoxia    As an Intensivist, we have completed an accredited Critical Care program and maintain Board Certification and dedicate most of our professional work to critical/intensive care. We serve as the  or member of an interprofessional team, coordinating and guiding healthcare professionals to provide specialized care for critically ill patients. We manage and resuscitate patients with, or at risk for, critical illness and organ failure and initiate interventions to prevent imminent deterioration. ( Consensus Statement from the Society of Critical Care Medicine Defining Intensivist Task Force. [facility] . DOI: 10.1097/[facility].[id number]     Interval History:    Intubated.    Sedated.    Decreasing urinary output.  Last 24 h total urinary output: 1,545 but 1,375 was from 7AM to  7 PM, and only 170 in the last 12 h.    Last Bowel Movement: 25 (25 0410)    Temp  Min: 97.6 °F (36.4 °C)  Max: 98.3 °F (36.8 °C)       History     Last Reviewed by Salo Arellano MD on 3/30/2025 at  7:11 AM    Sections Reviewed    Medical, Surgical, Family, Tobacco, Alcohol, Drug Use, Sexual Activity,   Social Documentation    Problem list reviewed by Salo Arellano MD on 3/30/2025 at  7:10 AM  Medicines reviewed by Salo Arellano MD on 3/30/2025 at  7:10 AM  Allergies reviewed by Salo Arellano MD on 3/30/2025 at  7:10 AM       The patient's relevant past medical, surgical and social history were reviewed and updated in Epic as appropriate.        OBJECTIVE     Physical Examination    Vitals:  Temp: 98.1 °F (36.7 °C) (25 0756) Temp  Min: 97.6 °F (36.4 °C)  Max: 98.3 °F (36.8 °C)   Temp core:      BP: 126/64 (25 0700) BP  Min: 112/54  Max: 184/73   MAP (non-invasive) Noninvasive MAP (mmHg): 76 (25 0700) Noninvasive MAP (mmHg)  Av.4  Min: 58   Max: 123   Pulse: 55 (04/03/25 0756) Pulse  Min: 40  Max: 73   Resp: 13 (04/03/25 0756) Resp  Min: 12  Max: 15   SpO2: 96 % (04/03/25 0756) SpO2  Min: 71 %  Max: 100 %   Device: ventilator (04/03/25 0756)    Flow Rate: 6 (03/29/25 1858) No data recorded     Intake/Ouptut 24 hrs (7:00AM - 6:59 AM)  Intake & Output (last 2 days)         04/01 0701 04/02 0700 04/02 0701 04/03 0700 04/03 0701 04/04 0700    I.V. (mL/kg) 1241.5 (13.9) 765.9 (8.5)     Other 330 484     NG/ 1432     IV Piggyback 230.1 100     Total Intake(mL/kg) 2674.6 (29.9) 2781.9 (31)     Urine (mL/kg/hr) 5650 (2.6) 1545 (0.7)     Stool 0 0     Total Output 5650 1545     Net -2975.4 +1236.9            Stool Unmeasured Occurrence 2 x 2 x           Medications (drips):  fentanyl 10 mcg/mL, Last Rate: 200 mcg/hr (04/03/25 0302)  midazolam, Last Rate: 2 mg/hr (04/02/25 1815)          03/31/25  0600 04/01/25  0600   Weight: 89.2 kg (196 lb 10.4 oz) 89.6 kg (197 lb 8.5 oz)      Invasive Mechanical Ventilator   Settings: Observed:   Mode: VC+/AC (04/03/25 0705)    Resp Rate (Set): 12 (04/03/25 0705) Resp Rate (Observed) Vent: 12 (04/03/25 0756)   Vt (Set, mL): 470 mL (04/03/25 0705) Vt, Exp (observed, mL): 437 mL (04/03/25 0705)    Minute Ventilation (L/min) (Obs): 5.26 L/min (04/03/25 0705)    I:E Ratio (Obs): 1:4 (04/03/25 0705)       FiO2 (%): 40 % (04/03/25 0705) Plateau Pressure (cm H2O): (S) 26 cm H2O (04/03/25 0705)   PEEP/CPAP (cm H2O): 5 cm H20 (04/03/25 0705) Driving Pressure (cm H2O): 20.8 cm H2O (04/03/25 0705)    Static Compliance (L/cm H2O): 14 (04/03/25 0705)      Telemetry:  Rhythm: sinus bradycardia (04/03/25 0600)         Constitutional:  No acute distress.   Cardiovascular: RRR.    Respiratory: Normal breath sounds  (+) Rhonchi   Abdominal:  Soft with no tenderness.   Extremities: Edema  Ulcer R ankle.   Neurological:   Sedated.  Best Eye Response: 3-->(E3) to speech (04/03/25 0400)  Best Motor Response: 4-->(M4) withdraws from pain  (04/03/25 0400)  Best Verbal Response: 1-->(V1) none (04/03/25 0400)  Crescencio Coma Scale Score: 8 (04/03/25 0400)     Results Reviewed:  Laboratory  Microbiology  Radiology  Pathology    Hematology:  Results from last 7 days   Lab Units 04/03/25  0405 04/02/25 0458 04/01/25  0422   WBC 10*3/mm3 11.03* 8.21 11.25*   HEMOGLOBIN g/dL 9.4* 9.1* 8.7*   MCV fL 91.3 88.8 88.5   PLATELETS 10*3/mm3 286 258 258     Results from last 7 days   Lab Units 04/03/25  0405 04/02/25 0458 03/31/25  0435   NEUTROS ABS 10*3/mm3 8.67* 5.88 6.38   LYMPHS ABS 10*3/mm3 0.96 1.03 1.24   EOS ABS 10*3/mm3 0.28 0.33 0.28     Chemistry:  Estimated Creatinine Clearance: 65.8 mL/min (A) (by C-G formula based on SCr of 1.38 mg/dL (H)).    Results from last 7 days   Lab Units 04/03/25 0405 04/02/25  0458   SODIUM mmol/L 140 138   POTASSIUM mmol/L 4.9 4.1   CHLORIDE mmol/L 108* 106   CO2 mmol/L 23.0 22.0   BUN mg/dL 36* 26*   CREATININE mg/dL 1.38* 1.28*   GLUCOSE mg/dL 149* 93     Results from last 7 days   Lab Units 04/03/25  0405 04/02/25  0458   CALCIUM mg/dL 9.2 8.9   MAGNESIUM mg/dL 2.4 2.3   PHOSPHORUS mg/dL 3.5 2.5     Hepatic Panel:  Results from last 7 days   Lab Units 03/31/25  0435 03/30/25  0612 03/29/25  1708   ALBUMIN g/dL 3.2* 3.4* 3.8   TOTAL PROTEIN g/dL 6.2 6.8 7.5   BILIRUBIN mg/dL 0.6 0.9 0.2   AST (SGOT) U/L 31 28 30   ALT (SGPT) U/L 27 26 29   ALK PHOS U/L 121* 92 102       Cardiac Labs:  Results from last 7 days   Lab Units 03/29/25 1828 03/29/25  1708   PROBNP pg/mL  --  3,216.0*   HSTROP T ng/L 100* 98*     Biomarkers:  Results from last 7 days   Lab Units 03/31/25  0435 03/29/25 1828 03/29/25  1708   CRP mg/dL 15.85*  --   --    LACTATE mmol/L  --   --  0.7   PROCALCITONIN ng/mL 1.58* 0.39*  --      U/A  Results from last 7 days   Lab Units 03/29/25  1944   COLOR UA  Yellow   CLARITY UA  Cloudy*   PH, URINE  5.5   SPECIFIC GRAVITY, URINE  1.008   GLUCOSE UA  Negative   KETONES UA  Negative   BILIRUBIN UA  Negative    PROTEIN UA  Negative   BLOOD UA  Small (1+)*   LEUKOCYTES UA  Large (3+)*   NITRITE UA  Negative   UROBILINOGEN UA  0.2 E.U./dL     Results from last 7 days   Lab Units 03/29/25  1944   RBC UA /HPF 0-2   WBC UA /HPF Too Numerous to Count*   BACTERIA UA /HPF 4+*   SQUAM EPITHEL UA /HPF None Seen   HYALINE CASTS UA /LPF None Seen       COVID-19  Lab Results   Component Value Date    COVID19 Not Detected 03/31/2025    COVID19 Not Detected 03/29/2025    COVID19 Not Detected 11/07/2024    COVID19 Not Detected 10/22/2024       Arterial Blood Gases:  Results from last 7 days   Lab Units 04/03/25  0353 04/02/25  0330 04/01/25  1306   PH, ARTERIAL pH units 7.330* 7.479* 7.355   PCO2, ARTERIAL mm Hg 47.7* 32.6* 43.0   PO2 ART mm Hg 115.0* 81.7* 90.3   FIO2 % 40 40 40       Images:  XR Chest 1 View  Result Date: 4/3/2025  Diffuse bilateral pulmonary infiltrates, right greater than left, with bilateral pleural effusions, right larger than left. Electronically Signed: Ricky Valverde MD  4/3/2025 4:30 AM EDT  Workstation ID: [id number]    XR Chest 1 View  Result Date: 4/2/2025  Slight interval worsening of right-sided infiltrates. Stable left-sided infiltrates. Electronically Signed: Ricky Valverde MD  4/2/2025 5:49 AM EDT  Workstation ID: [id number]      Echo:  Results for orders placed during the hospital encounter of 11/07/24    Adult Transthoracic Echo Limited W/ Cont if Necessary Per Protocol    Interpretation Summary    Left ventricular ejection fraction appears to be 56 - 60%.    Left ventricular wall thickness is consistent with concentric hypertrophy.  moderate.  Strain is normal and in normal pattern.  GLS -24%    Left ventricular diastolic dysfunction is noted.    There is a trivial pericardial effusion.      Results: Reviewed.  I reviewed the patient's new laboratory and imaging results.  I independently reviewed the patient's new images.    Medications: Reviewed.    Assessment   A/P      Assessment/Management/Treatment Plan:    Hospital:  LOS: 5 days   ICU: 4d 11h     Active Hospital Problems    Diagnosis  POA    **Acute respiratory failure with hypoxia [J96.01]  Yes     Omkar is a 69 y.o. male, long-term care facility resident, admitted on 3/29/2025 with Acute respiratory failure with hypoxia [J96.01]    In the ED, labs significant for Hb 6.6. and sodium 120.      Family member noted patient has been having blood in stool per nursing facility.      Chest x-ray reviewed, bilateral opacities.      He was placed on BiPAP for hypoxia and work of breathing but eventually required intubation 03/29/25 @ 22:38    Comorbidities: vascular dementia, dysphagia status post PEG tube    Respiratory failure, type 1, acute.  Intubated 03/29/25   Invasive Mechanical Ventilation   Bronch: 03/31/25 Coronavirus 229E  UTI (Culture 03/2/25): ESBL Klebsiella pneumonia > 10^5  Cardiovascular  HFpEF  Renal  JAYY    Lab Results   Component Value Date    CREATININE 1.38 (H) 04/03/2025    CREATININE 1.28 (H) 04/02/2025    CREATININE 1.60 (H) 04/01/2025    CREATININE 1.68 (H) 03/31/2025    CREATININE 1.77 (H) 03/30/2025    CREATININE 1.58 (H) 03/30/2025    CREATININE 1.49 (H) 03/29/2025    CREATININE 1.54 (H) 03/29/2025    CREATININE 1.24 02/16/2025     Neuro  Dementia  GI/Hepatology  Melena prior to admission, as per family report.  Chronic aspiration  PEG  Hematology  Chronic anemia  R/O Anemia Acute Blood Loss  Nutrition Support    Peptamen Intense (1.0 kcal/mL, 92 g protein/L, 4 g fiber/L)  Gastrostomy/Enterostomy Percutaneous endoscopic gastrostomy (PEG) 1 20 Fr. LUQ-Tube Feeding Rate (mL/hr): 75 mL/HR (Based on a feeding duration of 20 h/d)    Lab Results   Component Value Date    PREALBUMIN 16.0 (L) 03/31/2025    CRP 15.85 (H) 03/31/2025     Endocrine   Body mass index is 27.05 kg/m². Overweight: 25.0-29.9kg/m2   Type 2 diabetes.    Lab Results   Lab Value Date/Time    HGBA1C 5.60 03/29/2025 1708    HGBA1C 5.60  09/15/2024 0658    HGBA1C 8.7 (H) 06/25/2022 0242    HGBA1C 13.4 04/14/2022 1058     Results from last 7 days   Lab Units 04/03/25  0528 04/02/25  2322 04/02/25  1745 04/02/25  1142 04/02/25  0625 04/01/25  2327 04/01/25  1744 04/01/25  1210   GLUCOSE mg/dL 125 129 110 99 116 95 85 110       Diet: NPO Diet NPO Type: Tube Feeding   Advance Directives: Code Status and Medical Interventions: CPR (Attempt to Resuscitate); Full Support   Ordered at: 03/29/25 1926     Code Status (Patient has no pulse and is not breathing):    CPR (Attempt to Resuscitate)     Medical Interventions (Patient has pulse or is breathing):    Full Support        VTE Prophylaxis:  Mechanical VTE prophylaxis orders are present.         In brief:    Not ready to be liberated from Invasive Mechanical Ventilation   CT Chest 04/03/25: Dense and extensive airspace disease of both lower lobes. Also volume overload. Small left and moderate right pleural effusions.  Monitor Renal function. SCr improving. But urinary output down in from 19:00 to 07:00.  Diuretic challenge.    Lab Results   Component Value Date    CREATININE 1.38 (H) 04/03/2025    CREATININE 1.28 (H) 04/02/2025    CREATININE 1.60 (H) 04/01/2025    CREATININE 1.68 (H) 03/31/2025    CREATININE 1.77 (H) 03/30/2025    CREATININE 1.58 (H) 03/30/2025     Goal: Glucose < 180 mg/dL.   Continue MICHELLE due presence of ESBL Klebsiella in urine culture  Continue Enteral Nutrition (Peptamen VHP) 1500 mL/d  Indirect calorimetry in AM  Discussed with Idalia JUAREZ) on 03/31/25  We reviewed his current condition, prognosis with Dementia, and potential need for long term ventilator. At this time she wants to see his progress -if any- over next several days, before re-addressing Code Status and Level of Support.  Disposition: Keep in ICU.    Plan of care and goals reviewed during interdisciplinary rounds.  I discussed the patient's findings and my recommendations with family and nursing staff    Time: was  greater than 34 minutes. This is non-concurrent time.   (This excludes time spent performing separately reportable procedures and services).    He has a high risk of imminent or life-threatening  deterioration, which requires the highest level of physician preparedness to intervene urgently. I devoted my full attention to the direct care of this patient for the amount of time indicated above.     Time spent with family or surrogate(s) is included only if the patient was incapable of providing the necessary information or participating in medical decision making.    He has the following organ/system impairments Respiratory Failure.        [x] Primary Attending Intensive Care Medicine - Nutrition Support   [] Consultant    Copied text in this note has been reviewed and is accurate as of 04/03/25

## 2025-04-03 NOTE — PLAN OF CARE
Goal Outcome Evaluation:  Plan of Care Reviewed With: child         -Pt stiffens in response to stimulation &  hands but not to command. Fent @ 200 & Versed increased from 2 to 3 for ventilatory synchrony. BUE restraints for safety.   -Continues MV PEEP 5 FIO2 40%; Sm amount white/tan thick secretions. CT chest completed.   -Still oliguric; flushed FC & it was easily irrigated. Urine cloudy & w/ sediment. Bladder scan @ 1300 w/ 0ml, @ 1550 w/ 86ml. Albumin 25% 25g x1, Bumex 2mg Q4 x2 doses, Metolazone 10mg x1 & Chlorothiaze 500mg x1. Provider notified UOP only 277  this shift.   -Tolerating TF. No BM.   -Additional home BP meds added overnight; SBP today 120s-140s.   -Daughter, Idalia, updated via phone call.

## 2025-04-04 NOTE — PLAN OF CARE
Problem: Adult Inpatient Plan of Care  Goal: Plan of Care Review  Outcome: Not Progressing  Flowsheets (Taken 4/4/2025 0513)  Outcome Evaluation: patient remains on vent, FiO2 40%, sedated on fentanyl and versed. remains tense, urine output low throughout shift.     Problem: Restraint, Nonviolent  Goal: Absence of Harm or Injury  Outcome: Not Progressing  Intervention: Implement Least Restrictive Safety Strategies  Recent Flowsheet Documentation  Taken 4/4/2025 0400 by Jimbo Braga RN  Medical Device Protection: IV pole/bag removed from visual field  Diversional Activities: television  Taken 4/4/2025 0200 by Jimbo Braga RN  Medical Device Protection: IV pole/bag removed from visual field  Diversional Activities: television  Taken 4/4/2025 0000 by Jimbo Braga RN  Medical Device Protection:   IV pole/bag removed from visual field   tubing secured  Diversional Activities: television  Taken 4/3/2025 2200 by Jimbo Braga RN  Medical Device Protection:   IV pole/bag removed from visual field   tubing secured  Diversional Activities: television  Taken 4/3/2025 2000 by Jimbo Braga RN  Medical Device Protection:   IV pole/bag removed from visual field   tubing secured  Diversional Activities: television  Intervention: Protect Dignity, Rights and Personal Wellbeing  Recent Flowsheet Documentation  Taken 4/4/2025 0400 by Jimbo Braga RN  Trust Relationship/Rapport: care explained  Taken 4/4/2025 0200 by Jimbo Braga RN  Trust Relationship/Rapport: care explained  Taken 4/4/2025 0000 by Jimbo Braga RN  Trust Relationship/Rapport: care explained  Taken 4/3/2025 2200 by Jimbo Braga RN  Trust Relationship/Rapport:   care explained   reassurance provided  Taken 4/3/2025 2000 by Jimbo Braga RN  Trust Relationship/Rapport: care explained  Intervention: Protect Skin and Joint Integrity  Recent Flowsheet Documentation  Taken 4/4/2025 0400 by Jimbo Braga RN  Body Position:   turned   right  Taken 4/4/2025 0200 by Jimbo Braga RN  Body  Position:   turned   left  Taken 4/4/2025 0000 by Jimbo Braga RN  Body Position:   turned   right  Skin Protection: incontinence pads utilized  Taken 4/3/2025 2200 by Jimbo Braga RN  Body Position:   turned   left  Skin Protection: incontinence pads utilized  Taken 4/3/2025 2000 by Jimbo Braga RN  Body Position:   turned   right  Skin Protection:   incontinence pads utilized   transparent dressing maintained  Range of Motion: ROM (range of motion) performed   Goal Outcome Evaluation:

## 2025-04-04 NOTE — CASE MANAGEMENT/SOCIAL WORK
Continued Stay Note  [facility]     Patient Name: Omkar Nava  MRN: [id number]  Today's Date: 4/4/2025    Admit Date: 3/29/2025    Plan: [facility]   Discharge Plan       Row Name 04/04/25 1455       Plan    Plan [facility]    Patient/Family in Agreement with Plan yes    Plan Comments Discussed Mr. Nava in MDR. Patient remains on the ventilator. Discharge plan is to return to [facility] once medically ready for discharge. CM will continue to follow up.    Final Discharge Disposition Code 04 - intermediate care facility                   Discharge Codes    No documentation.                       Jaki Felton RN

## 2025-04-04 NOTE — PROGRESS NOTES
INTENSIVIST   PROGRESS NOTE        SUBJECTIVE     Omkar 69 y.o. male is followed for: Shortness of Breath       Acute respiratory failure with hypoxia    As an Intensivist, we have completed an accredited Critical Care program and maintain Board Certification and dedicate most of our professional work to critical/intensive care. We serve as the  or member of an interprofessional team, coordinating and guiding healthcare professionals to provide specialized care for critically ill patients. We manage and resuscitate patients with, or at risk for, critical illness and organ failure and initiate interventions to prevent imminent deterioration. ( Consensus Statement from the Society of Critical Care Medicine Defining Intensivist Task Force. [facility] . DOI: 10.1097/[facility].[id number]     Interval History:    Intubated.    Sedated.    Occasionally open eyes.     Decreasing urinary output.  Last 24 h total urinary output: 537.    Last Bowel Movement: 25 (25)    Temp  Min: 97.2 °F (36.2 °C)  Max: 99 °F (37.2 °C)       History     Last Reviewed by Salo Arellano MD on 3/30/2025 at  7:11 AM    Sections Reviewed    Medical, Surgical, Family, Tobacco, Alcohol, Drug Use, Sexual Activity,   Social Documentation    Problem list reviewed by Salo Arellano MD on 3/30/2025 at  7:10 AM  Medicines reviewed by Salo Arellano MD on 3/30/2025 at  7:10 AM  Allergies reviewed by Salo Arellano MD on 3/30/2025 at  7:10 AM       The patient's relevant past medical, surgical and social history were reviewed and updated in Epic as appropriate.        OBJECTIVE     Physical Examination    Vitals:  Temp: 98.8 °F (37.1 °C) (25 0400) Temp  Min: 97.2 °F (36.2 °C)  Max: 99 °F (37.2 °C)   Temp core:      BP: 122/48 (25 0600) BP  Min: 117/50  Max: 146/63   MAP (non-invasive) Noninvasive MAP (mmHg): 77 (25 06) Noninvasive MAP (mmHg)  Av.2  Min: 58  Max: 123   Pulse: (!) 45 (25 06)  Pulse  Min: 44  Max: 63   Resp: 16 (04/04/25 0445) Resp  Min: 12  Max: 16   SpO2: 95 % (04/04/25 0600) SpO2  Min: 92 %  Max: 99 %   Device: ventilator (04/04/25 0600)    Flow Rate: 6 (03/29/25 6688) No data recorded     Intake/Ouptut 24 hrs (7:00AM - 6:59 AM)  Intake & Output (last 2 days)         04/02 0701 04/03 0700 04/03 0701 04/04 0700 04/04 0701 04/05 0700    I.V. (mL/kg) 765.9 (8.5) 800.6 (8.6)     Other 484 310     NG/GT 1432 1605     IV Piggyback 100 290.2     Total Intake(mL/kg) 2781.9 (31) 3005.8 (32.4)     Urine (mL/kg/hr) 1545 (0.7) 537 (0.2)     Stool 0      Total Output 1545 537     Net +1236.9 +2468.8            Stool Unmeasured Occurrence 2 x            Medications (drips):  fentanyl 10 mcg/mL, Last Rate: 200 mcg/hr (04/04/25 0134)  midazolam, Last Rate: 3 mg/hr (04/04/25 0425)          04/01/25 0600 04/04/25 0600   Weight: 89.6 kg (197 lb 8.5 oz) 92.7 kg (204 lb 5.9 oz)      Invasive Mechanical Ventilator   Settings: Observed:   Mode: VC+/AC (04/04/25 0445)    Resp Rate (Set): 12 (04/04/25 0743) Resp Rate (Observed) Vent: 12 (04/04/25 0743)   Vt (Set, mL): 470 mL (04/04/25 0743) Vt, Exp (observed, mL): 462 mL (04/04/25 0743)    Minute Ventilation (L/min) (Obs): 5.53 L/min (04/04/25 0743)    I:E Ratio (Obs): 1:4 (04/04/25 0743)       FiO2 (%): 40 % (04/04/25 0743) Plateau Pressure (cm H2O): (S) 28 cm H2O (04/03/25 2000)   PEEP/CPAP (cm H2O): 5 cm H20 (04/04/25 0743) Driving Pressure (cm H2O): 22.7 cm H2O (04/03/25 2000)    Static Compliance (L/cm H2O): 20 (04/03/25 2000)      Telemetry:  Rhythm: sinus bradycardia (04/04/25 0600)         Constitutional:  No acute distress.   Cardiovascular: RRR.    Respiratory: Normal breath sounds  (+) Rhonchi   Abdominal:  Soft with no tenderness.   Extremities: Edema  Ulcer R ankle.   Neurological:   Sedated.  Best Eye Response: 3-->(E3) to speech (04/04/25 0600)  Best Motor Response: 2-->(M2) extension to pain (04/04/25 0600)  Best Verbal Response: 1-->(V1)  none (04/04/25 0600)  Douglas Coma Scale Score: 6 (04/04/25 0600)     Results Reviewed:  Laboratory  Microbiology  Radiology  Pathology    Hematology:  Results from last 7 days   Lab Units 04/04/25 0416 04/03/25  0405 04/02/25  0458   WBC 10*3/mm3 12.31* 11.03* 8.21   HEMOGLOBIN g/dL 8.2* 9.4* 9.1*   MCV fL 91.5 91.3 88.8   PLATELETS 10*3/mm3 244 286 258     Results from last 7 days   Lab Units 04/04/25 0416 04/03/25  0405 04/02/25  0458   NEUTROS ABS 10*3/mm3 8.99* 8.67* 5.88   LYMPHS ABS 10*3/mm3 1.53 0.96 1.03   EOS ABS 10*3/mm3 0.33 0.28 0.33     Chemistry:  Estimated Creatinine Clearance: 36 mL/min (A) (by C-G formula based on SCr of 2.34 mg/dL (H)).    Results from last 7 days   Lab Units 04/04/25 0416 04/03/25  0405   SODIUM mmol/L 139 140   POTASSIUM mmol/L 4.9 4.9   CHLORIDE mmol/L 107 108*   CO2 mmol/L 22.0 23.0   BUN mg/dL 59* 36*   CREATININE mg/dL 2.34* 1.38*   GLUCOSE mg/dL 131* 149*     Results from last 7 days   Lab Units 04/04/25  0416 04/03/25  0405   CALCIUM mg/dL 9.1 9.2   MAGNESIUM mg/dL 2.6* 2.4   PHOSPHORUS mg/dL 3.7 3.5     Hepatic Panel:  Results from last 7 days   Lab Units 03/31/25  0435 03/30/25  0612 03/29/25  1708   ALBUMIN g/dL 3.2* 3.4* 3.8   TOTAL PROTEIN g/dL 6.2 6.8 7.5   BILIRUBIN mg/dL 0.6 0.9 0.2   AST (SGOT) U/L 31 28 30   ALT (SGPT) U/L 27 26 29   ALK PHOS U/L 121* 92 102       Cardiac Labs:  Results from last 7 days   Lab Units 04/04/25  0416 03/29/25  1828 03/29/25  1708   PROBNP pg/mL 6,534.0*  --  3,216.0*   HSTROP T ng/L  --  100* 98*     Biomarkers:  Results from last 7 days   Lab Units 04/04/25  0416 03/31/25  0435 03/29/25  1828 03/29/25  1708   CRP mg/dL  --  15.85*  --   --    LACTATE mmol/L  --   --   --  0.7   PROCALCITONIN ng/mL 0.44* 1.58* 0.39*  --      U/A  Results from last 7 days   Lab Units 03/29/25  1944   COLOR UA  Yellow   CLARITY UA  Cloudy*   PH, URINE  5.5   SPECIFIC GRAVITY, URINE  1.008   GLUCOSE UA  Negative   KETONES UA  Negative   BILIRUBIN UA   "Negative   PROTEIN UA  Negative   BLOOD UA  Small (1+)*   LEUKOCYTES UA  Large (3+)*   NITRITE UA  Negative   UROBILINOGEN UA  0.2 E.U./dL     Results from last 7 days   Lab Units 03/29/25  1944   RBC UA /HPF 0-2   WBC UA /HPF Too Numerous to Count*   BACTERIA UA /HPF 4+*   SQUAM EPITHEL UA /HPF None Seen   HYALINE CASTS UA /LPF None Seen       COVID-19  Lab Results   Component Value Date    COVID19 Not Detected 03/31/2025    COVID19 Not Detected 03/29/2025    COVID19 Not Detected 11/07/2024    COVID19 Not Detected 10/22/2024       Arterial Blood Gases:  Results from last 7 days   Lab Units 04/04/25  0253 04/03/25  0353 04/02/25  0330   PH, ARTERIAL pH units 7.326* 7.330* 7.479*   PCO2, ARTERIAL mm Hg 46.0* 47.7* 32.6*   PO2 ART mm Hg 76.9* 115.0* 81.7*   FIO2 % 40 40 40       Images:  XR Chest 1 View  Result Date: 4/4/2025  No significant interval change. Electronically Signed: Ricky Valverde MD  4/4/2025 5:59 AM EDT  Workstation ID: [id number]    CT Chest Without Contrast Diagnostic  Result Date: 4/3/2025  Impression: 1. Dense and extensive airspace disease of both lower lobes, on the right more typical for simple atelectasis, on the left, suspicious for extensive left lower lobe pneumonia. 2. Prominent pulmonary vasculature and also diffuse perihilar disease favored to represent a combination of volume overload/CHF, and bilateral pneumonia. 3. Small left and moderate right free-flowing pleural effusions. 5. Incidentally noted probable gallbladder \"sludge\". No evidence of gallbladder inflammation or biliary ductal dilatation. Electronically Signed: Wisam Oquendo MD  4/3/2025 11:03 AM EDT  Workstation ID: [id number]    XR Chest 1 View  Result Date: 4/3/2025  Diffuse bilateral pulmonary infiltrates, right greater than left, with bilateral pleural effusions, right larger than left. Electronically Signed: Ricky Valverde MD  4/3/2025 4:30 AM EDT  Workstation ID: [id number]      Echo:  Results for orders placed during the " hospital encounter of 11/07/24    Adult Transthoracic Echo Limited W/ Cont if Necessary Per Protocol    Interpretation Summary    Left ventricular ejection fraction appears to be 56 - 60%.    Left ventricular wall thickness is consistent with concentric hypertrophy.  moderate.  Strain is normal and in normal pattern.  GLS -24%    Left ventricular diastolic dysfunction is noted.    There is a trivial pericardial effusion.      Results: Reviewed.  I reviewed the patient's new laboratory and imaging results.  I independently reviewed the patient's new images.    Medications: Reviewed.    Assessment   A/P     Assessment/Management/Treatment Plan:    Hospital:  LOS: 6 days   ICU: 5d 10h     Active Hospital Problems    Diagnosis  POA    **Acute respiratory failure with hypoxia [J96.01]  Yes     Omkar is a 69 y.o. male, long-term care facility resident, admitted on 3/29/2025 with Acute respiratory failure with hypoxia [J96.01]    In the ED, labs significant for Hb 6.6. and sodium 120.      Family member noted patient has been having blood in stool per nursing facility.      Chest x-ray reviewed, bilateral opacities.      He was placed on BiPAP for hypoxia and work of breathing but eventually required intubation 03/29/25 @ 22:38    Comorbidities: vascular dementia, dysphagia status post PEG tube    Respiratory failure, type 1, acute.  Intubated 03/29/25   Invasive Mechanical Ventilation   Bronch: 03/31/25 Coronavirus 229E  CT Chest 04/03/25: Dense and extensive airspace disease of both lower lobes. Also volume overload. Small left and moderate right pleural effusions.  UTI (Culture 03/2/25): ESBL Klebsiella pneumonia > 10^5  Cardiovascular  HFpEF  Renal  JAYY - oliguric/anuric.    Lab Results   Component Value Date    CREATININE 2.34 (H) 04/04/2025      CREATININE 1.38 (H) 04/03/2025    CREATININE 1.28 (H) 04/02/2025    CREATININE 1.60 (H) 04/01/2025    CREATININE 1.68 (H) 03/31/2025    CREATININE 1.77 (H) 03/30/2025     CREATININE 1.58 (H) 03/30/2025    CREATININE 1.49 (H) 03/29/2025    CREATININE 1.54 (H) 03/29/2025    CREATININE 1.24 02/16/2025     Neuro  Dementia  GI/Hepatology  Melena prior to admission, as per family report.  Chronic aspiration  PEG  Hematology  Chronic anemia  R/O Anemia Acute Blood Loss  Nutrition Support  Peptamen Intense (1.0 kcal/mL, 92 g protein/L, 4 g fiber/L)  Gastrostomy/Enterostomy Percutaneous endoscopic gastrostomy (PEG) 1 20 Fr. LUQ-Tube Feeding Rate (mL/hr): 75 mL/HR (Based on a feeding duration of 20 h/d)    Lab Results   Component Value Date    PREALBUMIN 16.0 (L) 03/31/2025    CRP 15.85 (H) 03/31/2025     Endocrine   Body mass index is 27.99 kg/m². Overweight: 25.0-29.9kg/m2   Type 2 diabetes.    Lab Results   Lab Value Date/Time    HGBA1C 5.60 03/29/2025 1708    HGBA1C 5.60 09/15/2024 0658    HGBA1C 8.7 (H) 06/25/2022 0242    HGBA1C 13.4 04/14/2022 1058     Results from last 7 days   Lab Units 04/04/25  0533 04/03/25  2317 04/03/25  1800 04/03/25  1148 04/03/25  0528 04/02/25  2322 04/02/25  1745 04/02/25  1142   GLUCOSE mg/dL 155* 118 147* 121 125 129 110 99       Diet: NPO Diet NPO Type: Tube Feeding   Advance Directives: Code Status and Medical Interventions: CPR (Attempt to Resuscitate); Full Support   Ordered at: 03/29/25 1926     Code Status (Patient has no pulse and is not breathing):    CPR (Attempt to Resuscitate)     Medical Interventions (Patient has pulse or is breathing):    Full Support        VTE Prophylaxis:  Pharmacologic & mechanical VTE prophylaxis orders are present.         In brief:    Discussed with Idaliasam DIA and patient's sister:  No improvement. Furthermore, now he is oliguric, and sCR trending up as expected.   She states that they don't to put through dialysis.  We also talked about his underlying vascular dementia, and that it would not be any better than before if his survives this hospitalization.  They want to see the clinical course until Tuesday  04/08/25  They want to change the Code Status to DNACPR. (Do Not Attempt CPR)  They want to change the Level of Support to DO NOT ESCALATE. Including no dialysis, no vasopressor.   They understand he may not survive until Tuesday 04/08/25.  They do not want a Palliative Medicine or Hospice consult at this time.  Discussed with Idalia JAUREZ) on 03/31/25  We reviewed his current condition, prognosis with Dementia, and potential need for long term ventilator. At this time she wants to see his progress -if any- over next several days, before re-addressing Code Status and Level of Support.  Renal failure, after an initial improvement in the SCr, it has started to go up again, furthermore he is becoming oliguric/anuric despite a diuretic challenge.  Prognosis is very poor.  Goal: Glucose < 180 mg/dL.   Continue Enteral Nutrition in the meantime.  Disposition: Keep in ICU.    Plan of care and goals reviewed during interdisciplinary rounds.  I discussed the patient's findings and my recommendations with family and nursing staff    Time: was greater than 35 minutes. This is non-concurrent time.   (This excludes time spent performing separately reportable procedures and services).    He has a high risk of imminent or life-threatening  deterioration, which requires the highest level of physician preparedness to intervene urgently. I devoted my full attention to the direct care of this patient for the amount of time indicated above.     Time spent with family or surrogate(s) is included only if the patient was incapable of providing the necessary information or participating in medical decision making.    He has the following organ/system impairments Respiratory Failure.        [x] Primary Attending Intensive Care Medicine - Nutrition Support   [] Consultant    Copied text in this note has been reviewed and is accurate as of 04/04/25

## 2025-04-04 NOTE — PLAN OF CARE
Goal Outcome Evaluation:      No attempt at weaning this shift

## 2025-04-04 NOTE — PLAN OF CARE
Goal Outcome Evaluation:  Plan of Care Reviewed With: child, sibling      -Pt remains sedated w/ Fentanyl @200, Versed increased from 3 to 5, & several PRN boluses for ventilator asynchrony, particularly w/ stimulation & care.   -Continues MV. FIO2 40% & PEEP 5.   -Serum cr elevated from 1.38 to 2.34 today. Increased edema. UOP 40ml this shift.   -Sm liquid brown BM x1   -GOC conversation between intensivist, daughter, sister, & primary RN. Code status changed; see orders. Plan to not escalate care & see how patient does over the next few days.   Problem: Restraint, Nonviolent  Goal: Absence of Harm or Injury  Outcome: Progressing  Intervention: Implement Least Restrictive Safety Strategies  Recent Flowsheet Documentation  Taken 4/4/2025 1800 by Corrine Yung RN  Medical Device Protection:   IV pole/bag removed from visual field   tubing secured  Diversional Activities: television  Taken 4/4/2025 1600 by Corrine Yung RN  Medical Device Protection:   IV pole/bag removed from visual field   tubing secured  Diversional Activities: television  Taken 4/4/2025 1400 by Corrine Yung RN  Medical Device Protection:   IV pole/bag removed from visual field   tubing secured  Diversional Activities: television  Taken 4/4/2025 1200 by Corrine Yung RN  Medical Device Protection:   IV pole/bag removed from visual field   tubing secured  Diversional Activities: television  Taken 4/4/2025 1000 by Corrine Yung RN  Medical Device Protection:   IV pole/bag removed from visual field   tubing secured  Diversional Activities: television  Taken 4/4/2025 0800 by Corrine Yung RN  Medical Device Protection:   IV pole/bag removed from visual field   tubing secured  Diversional Activities: television  Intervention: Protect Dignity, Rights and Personal Wellbeing  Recent Flowsheet Documentation  Taken 4/4/2025 1800 by Corrine Yung RN  Trust Relationship/Rapport:   care explained   reassurance  provided  Taken 4/4/2025 1600 by Corrine Yung RN  Trust Relationship/Rapport:   care explained   reassurance provided  Taken 4/4/2025 1400 by Corrine Yung RN  Trust Relationship/Rapport:   care explained   reassurance provided  Taken 4/4/2025 1200 by Corrine Yung RN  Trust Relationship/Rapport:   care explained   reassurance provided  Taken 4/4/2025 1000 by Corrine Yung RN  Trust Relationship/Rapport:   care explained   reassurance provided  Taken 4/4/2025 0800 by Corrine Yung RN  Trust Relationship/Rapport:   care explained   reassurance provided  Intervention: Protect Skin and Joint Integrity  Recent Flowsheet Documentation  Taken 4/4/2025 1800 by Corrine Yung RN  Body Position:   turned   supine  Skin Protection:   incontinence pads utilized   silicone foam dressing in place  Taken 4/4/2025 1600 by Corrine Yung RN  Body Position:   turned   right  Skin Protection:   incontinence pads utilized   pulse oximeter probe site changed  Taken 4/4/2025 1400 by Corrine Yung RN  Body Position:   turned   left  Skin Protection:   incontinence pads utilized   silicone foam dressing in place  Taken 4/4/2025 1200 by Corrine Yung RN  Body Position:   turned   supine  Skin Protection:   incontinence pads utilized   silicone foam dressing in place  Taken 4/4/2025 1000 by Corrine Yung RN  Body Position:   turned   right   patient/family refused  Taken 4/4/2025 0800 by Corrine Yung RN  Body Position:   turned   right  Skin Protection:   incontinence pads utilized   silicone foam dressing in place

## 2025-04-05 NOTE — PLAN OF CARE
Goal Outcome Evaluation:  Plan of Care Reviewed With: child, family, sibling      -Palliative care consulted today for GOC conversations. Daughter not present at bedside when palliative MD rounded; RN discussed patient's continued decline w/ daughterIdalia, on the phone, & she would like to allow family to say goodbyes today & potentially withdraw care tomorrow. RN let Idalia know Dr. Buchanan would round sometime after 0900 tomorrow & she plans to be here for that.   -Pt continues MV w/ PEEP 5 & FIO2 increased from 40% to 50%; sedation increased for ventilator asynchrony (stacking breaths, increased peak pressure, hypoxia). Fent @ 250 & Versed @ 5, several PRN boluses w/ care.   -Tolerating TF. BM x 4 today.   -Cr up to 3.5. UOP 22. Lokelma for K+ 6.               Problem: Restraint, Nonviolent  Goal: Absence of Harm or Injury  4/5/2025 1839 by Corrine Yung RN  Outcome: Not Progressing  4/5/2025 1837 by Corrine Yung RN  Outcome: Not Progressing  Intervention: Implement Least Restrictive Safety Strategies  Recent Flowsheet Documentation  Taken 4/5/2025 1600 by Corrine Yung RN  Medical Device Protection:   IV pole/bag removed from visual field   tubing secured  Diversional Activities:   television   smartphone  Taken 4/5/2025 1400 by Corrine Yung RN  Medical Device Protection:   IV pole/bag removed from visual field   tubing secured  Diversional Activities: television  Taken 4/5/2025 1200 by Corrine Yung RN  Medical Device Protection:   IV pole/bag removed from visual field   tubing secured  Diversional Activities: television  Taken 4/5/2025 1000 by Corrine Yung RN  Medical Device Protection:   IV pole/bag removed from visual field   tubing secured  Diversional Activities: television  Taken 4/5/2025 0800 by Corrine Yung RN  Medical Device Protection:   IV pole/bag removed from visual field   tubing secured  Diversional Activities: television  Intervention:  Protect Dignity, Rights and Personal Wellbeing  Recent Flowsheet Documentation  Taken 4/5/2025 1600 by Corrine Yung RN  Trust Relationship/Rapport:   care explained   reassurance provided  Taken 4/5/2025 1400 by Corrine Yung RN  Trust Relationship/Rapport:   care explained   reassurance provided  Taken 4/5/2025 1200 by Corrine Yung RN  Trust Relationship/Rapport:   care explained   reassurance provided  Taken 4/5/2025 1000 by Corrine Yung RN  Trust Relationship/Rapport:   care explained   reassurance provided  Taken 4/5/2025 0800 by Corrine Yung RN  Trust Relationship/Rapport:   care explained   reassurance provided  Intervention: Protect Skin and Joint Integrity  Recent Flowsheet Documentation  Taken 4/5/2025 1600 by Corrine Yung RN  Body Position:   left   turned  Skin Protection:   incontinence pads utilized   silicone foam dressing in place  Taken 4/5/2025 1400 by Corrine Yung RN  Body Position:   turned   right  Taken 4/5/2025 1200 by Corrine Yung RN  Body Position:   supine   turned  Skin Protection:   incontinence pads utilized   silicone foam dressing in place  Taken 4/5/2025 1000 by Corrine Yung RN  Body Position:   left   weight shifting  Skin Protection:   incontinence pads utilized   silicone foam dressing in place  Taken 4/5/2025 0800 by Corrine Yung RN  Body Position:   turned   left  Skin Protection:   incontinence pads utilized   silicone foam dressing in place

## 2025-04-05 NOTE — PLAN OF CARE
Problem: Adult Inpatient Plan of Care  Goal: Plan of Care Review  Outcome: Not Progressing  Flowsheets (Taken 4/5/2025 0456)  Outcome Evaluation: Patient vented, sedated on fent and versed. little urine output overnight.     Problem: Restraint, Nonviolent  Goal: Absence of Harm or Injury  Outcome: Not Progressing  Intervention: Implement Least Restrictive Safety Strategies  Recent Flowsheet Documentation  Taken 4/5/2025 0400 by Jimbo Braga RN  Medical Device Protection: IV pole/bag removed from visual field  Diversional Activities: television  Taken 4/5/2025 0247 by Jimbo Braga RN  Medical Device Protection:   IV pole/bag removed from visual field   tubing secured  Diversional Activities: television  Taken 4/5/2025 0200 by Jimbo Braga RN  Medical Device Protection: IV pole/bag removed from visual field  Diversional Activities: television  Taken 4/5/2025 0000 by Jimbo Braga RN  Medical Device Protection: IV pole/bag removed from visual field  Diversional Activities: television  Taken 4/4/2025 2200 by Jimbo Braga RN  Medical Device Protection:   IV pole/bag removed from visual field   tubing secured  Diversional Activities: television  Taken 4/4/2025 2000 by Jimbo Braga RN  Medical Device Protection: IV pole/bag removed from visual field  Diversional Activities: television  Intervention: Protect Dignity, Rights and Personal Wellbeing  Recent Flowsheet Documentation  Taken 4/5/2025 0000 by Jimbo Braga RN  Trust Relationship/Rapport: care explained  Taken 4/4/2025 2200 by Jimbo Braga RN  Trust Relationship/Rapport: care explained  Taken 4/4/2025 2000 by Jimbo Braga RN  Trust Relationship/Rapport: care explained  Intervention: Protect Skin and Joint Integrity  Recent Flowsheet Documentation  Taken 4/5/2025 0400 by Jimbo Braga RN  Body Position:   turned   right  Skin Protection: incontinence pads utilized  Taken 4/5/2025 0200 by Jimbo Braga RN  Body Position:   turned   left  Skin Protection: incontinence pads  utilized  Taken 4/5/2025 0000 by Jimbo Braga RN  Body Position:   turned   right  Skin Protection: incontinence pads utilized  Taken 4/4/2025 2200 by Jimbo Braga RN  Body Position:   turned   left  Taken 4/4/2025 2000 by Jimbo Braga RN  Body Position:   turned   right  Skin Protection: incontinence pads utilized

## 2025-04-05 NOTE — CONSULTS
Palliative Care Initial Consult   Attending Physician: Salo Arellano MD  Referring Provider: Modesto Smith      Reason for Referral:  assistance with clarification of goals of care    Code Status:   Code Status and Medical Interventions: No CPR (Do Not Attempt to Resuscitate); Limited Support; No dialysis, No vasopressors, Other; Do NOT escalate care.   Ordered at: 04/04/25 1035     Code Status (Patient has no pulse and is not breathing):    No CPR (Do Not Attempt to Resuscitate)     Medical Interventions (Patient has pulse or is breathing):    Limited Support     Medical Intervention Limits:    No dialysis       No vasopressors       Other     Additional Medical Interventions Limits:    Do NOT escalate care.      Advanced Directives: Advance Directive Status: Patient has advance directive, copy in chart   Family/Support: dtr and sisters     Goals of Care:    Goals of Care/Treatment Preferences:    Already no CPR/DNI and no escalation or HD       HPI:   68 yo male with hx vascular dementia presented from ECF due to SOA.  Found with low hemoglobin, UTI, multifocal pneumonia on ED eval.  Viral swab + for coronavirus and urine + for MDR PSA.   Required intubation on day of admission.  In the last 24-48 hours has developed progressive renal failure. Had only 10 ml UOP over noc and none  yet today.  Remains sedated on vent.  No family present when seen.  Already with PEG for nutrition.    ROS:   Pt unable       Past Medical History:   Diagnosis Date    Anemia     Dementia     Diabetes mellitus     Dysphagia     GERD (gastroesophageal reflux disease)     History of alcohol abuse     History of cocaine use     History of marijuana use     Hypertension     Osteomyelitis     Poor historian     records obtained from nursing home records & his family    Visual impairment      Past Surgical History:   Procedure Laterality Date    AMPUTATION FOOT Left 10/18/2022    Procedure: PARTIAL FIRST RAY AMPUTATION LEFT;  Surgeon: Jovanny  "MD Yeison;  Location:  [facility];  Service: Orthopedics;  Laterality: Left;    AMPUTATION FOOT Left 2022    Procedure: Transmetatarsal of Left Foot;  Surgeon: Yeison Petty MD;  Location:  [facility];  Service: Orthopedics;  Laterality: Left;    ENDOSCOPY N/A 3/14/2024    Procedure: ESOPHAGOGASTRODUODENOSCOPY WITH JEJUNAL TUBE INSERTION AT BEDSIDE;  Surgeon: Brunner, Mark I, MD;  Location:  [facility];  Service: Gastroenterology;  Laterality: N/A;    ENDOSCOPY N/A 3/30/2025    Procedure: ESOPHAGOGASTRODUODENOSCOPY;  Surgeon: Brunner, Mark I, MD;  Location:  [facility];  Service: Gastroenterology;  Laterality: N/A;  WITH GTUBE EXCHANGED    ENDOSCOPY W/ PEG TUBE PLACEMENT N/A 2024    Procedure: ESOPHAGOGASTRODUODENOSCOPY WITH PERCUTANEOUS ENDOSCOPIC GASTROSTOMY TUBE INSERTION;  Surgeon: Brunner, Mark I, MD;  Location:  [facility];  Service: Gastroenterology;  Laterality: N/A;  EGD with PEG placement.  Secured at 3.5 cm    EYE SURGERY       Social History     Socioeconomic History    Marital status: Single   Tobacco Use    Smoking status: Former     Current packs/day: 0.00     Average packs/day: 1 pack/day for 40.0 years (40.0 ttl pk-yrs)     Types: Cigarettes     Start date: 1977     Quit date: 2017     Years since quittin.9     Passive exposure: Past    Smokeless tobacco: Never   Vaping Use    Vaping status: Never Used   Substance and Sexual Activity    Alcohol use: Not Currently     Comment: histgry of alcohol abuse    Drug use: Not Currently     Types: Marijuana, \"Crack\" cocaine     Comment: PATIENT STATES \"IT'S BEEN A FEW DAYS\"    Sexual activity: Defer     Family History   Problem Relation Age of Onset    Diabetes Mother     Hypertension Mother     Hypertension Father     Diabetes Father     Diabetes Sister     Hypertension Brother     Diabetes Brother     Diabetes Maternal Grandmother     Diabetes Maternal Grandfather     Hypertension Brother     Diabetes Brother        No " Known Allergies      Current Facility-Administered Medications:     albuterol (PROVENTIL) nebulizer solution 0.083% 2.5 mg/3mL, 2.5 mg, Nebulization, Q4H PRN, Salo Arellano MD, 2.5 mg at 03/31/25 1951    amLODIPine (NORVASC) tablet 10 mg, 10 mg, Per PEG Tube, Q24H, Daniela Judd, PharmD    castor oil-balsam peru (VENELEX) ointment 1 Application, 1 Application, Topical, Q12H, Salo Arellano MD, 1 Application at 04/05/25 0808    cloNIDine (CATAPRES) tablet 0.2 mg, 0.2 mg, Per PEG Tube, Q8H, Daniela Judd PharmBREE    dextrose (D50W) (25 g/50 mL) IV injection 25 g, 25 g, Intravenous, Q15 Min PRN, Brunner, Mark I, MD, 25 g at 03/30/25 2304    docusate sodium (COLACE) liquid 100 mg, 100 mg, Per PEG Tube, BID, Brunner, Mark I, MD, 100 mg at 04/04/25 0910    fentaNYL (SUBLIMAZE) bolus from bag 10 mcg/mL injection 50 mcg, 50 mcg, Intravenous, Q30 Min PRN, Salo Arellano MD, 50 mcg at 04/05/25 1145    fentaNYL 2500 mcg/250 mL NS infusion,  mcg/hr, Intravenous, Titrated, Modesto Smith MD, Last Rate: 25 mL/hr at 04/05/25 1024, 250 mcg/hr at 04/05/25 1024    glucagon (GLUCAGEN) injection 1 mg, 1 mg, Intramuscular, Q15 Min PRN, Brunner, Mark I, MD    heparin (porcine) 5000 UNIT/ML injection 5,000 Units, 5,000 Units, Subcutaneous, Q8H, Salo Arellano MD, 5,000 Units at 04/05/25 0523    hydrALAZINE (APRESOLINE) tablet 100 mg, 100 mg, Per PEG Tube, TID, Daniela Judd, PharmD, 100 mg at 04/04/25 2052    insulin regular (humuLIN R,novoLIN R) injection 2-7 Units, 2-7 Units, Subcutaneous, Q6H, Brunner, Mark I, MD, 2 Units at 04/04/25 1311    meropenem (MERREM) 1,000 mg in sodium chloride 0.9 % 100 mL MBP, 1,000 mg, Intravenous, Q8H, Salo Arellano MD, 1,000 mg at 04/05/25 0523    midazolam (VERSED) 100 mg in 100mL NS infusion, 1-10 mg/hr, Intravenous, Titrated, Modesto Smith MD, Last Rate: 5 mL/hr at 04/05/25 1024, 5 mg/hr at 04/05/25 1024    midazolam (VERSED) bolus from bag 1 mg/mL solution 2.5 mg, 2.5 mg, Intravenous,  "Q30 Min PRN, Modesto Smith MD, 2.5 mg at 04/05/25 1145    [START ON 4/27/2025] mupirocin (BACTROBAN) 2 % nasal ointment 1 Application, 1 Application, Each Nare, BID, Brunner, Mark I, MD    pantoprazole (PROTONIX) injection 40 mg, 40 mg, Intravenous, Q12H, Brunner, Mark I, MD, 40 mg at 04/05/25 0808    Potassium Replacement - Follow Nurse / BPA Driven Protocol, , Not Applicable, PRN, Salo Arellano MD    sodium chloride 0.9 % flush 10 mL, 10 mL, Intravenous, PRN, Brunner, Mark I, MD    terazosin (HYTRIN) capsule 2 mg, 2 mg, Per PEG Tube, Nightly, Salo Arellano MD, 2 mg at 04/04/25 2052    [Held by provider] Valproic Acid (DEPAKENE) syrup 150 mg, 150 mg, Per PEG Tube, Q12H, Brunner, Mark I, MD, 150 mg at 04/03/25 0801     Palliative Performance Scale Score:  10    /53   Pulse 55   Temp 99.1 °F (37.3 °C) (Bladder)   Resp 12   Ht 182 cm (71.65\")   Wt 92.7 kg (204 lb 5.9 oz)   SpO2 96%   BMI 27.99 kg/m²     Intake/Output Summary (Last 24 hours) at 4/5/2025 1209  Last data filed at 4/5/2025 0802  Gross per 24 hour   Intake 3128.45 ml   Output 37 ml   Net 3091.45 ml       Physical Exam:    General Appearance:    Unresponsive, sedated on vent, NAD   HEENT:    NC/AT, anicteric, MMM, face relaxed   Neck:   supple, trachea midline, no JVD   Lungs:     CTA bilat, diminished in bases; respirations regular, even     and unlabored    Heart:    RRR, normal S1 and S2, no M/R/G   Abdomen:     Normal bowel sounds, soft, nontender, nondistended   G/U:   Deferred   MSK/Extremities:   No clubbing , cyanosis, +edema   Pulses:   Pulses palpable and equal bilaterally   Skin:   Warm, dry, no mottling   Neurologic:   Unresponsive on vent, min withdrawal   Psych:   Calm when seen         Labs:   Results from last 7 days   Lab Units 04/05/25  0421   WBC 10*3/mm3 13.38*   HEMOGLOBIN g/dL 8.4*   HEMATOCRIT % 27.7*   PLATELETS 10*3/mm3 242     Results from last 7 days   Lab Units 04/05/25  0421 04/01/25  0422 03/31/25  0435 "   SODIUM mmol/L 137   < > 129*   POTASSIUM mmol/L 6.0*   < > 4.3   CHLORIDE mmol/L 106   < > 95*   CO2 mmol/L 21.0*   < > 20.0*   BUN mg/dL 85*   < > 35*   CREATININE mg/dL 3.54*   < > 1.68*   CALCIUM mg/dL 9.3   < > 8.5*   BILIRUBIN mg/dL  --   --  0.6   ALK PHOS U/L  --   --  121*   ALT (SGPT) U/L  --   --  27   AST (SGOT) U/L  --   --  31   GLUCOSE mg/dL 120*   < > 97    < > = values in this interval not displayed.     Imaging Results (Last 72 Hours)       Procedure Component Value Units Date/Time    XR Chest 1 View [[id number]] Collected: 04/05/25 0925     Updated: 04/05/25 0931    Narrative:      XR CHEST 1 VW    Date of Exam: 4/5/2025 5:22 AM EDT    Indication: Respiratory failure    Comparison: 4/4/2025    Findings:  ET tube is in good position below the clavicles. Lung volumes are relatively low. Heart shadow is normal in size. Diffuse interstitial disease pattern is similar to the prior study allowing for lower lung volumes today. There is probably a small left   effusion. No pneumothorax is identified.      Impression:      Impression:  Extensive but overall probably stable pulmonary disease pattern, more typical for bilateral pneumonia than pulmonary edema.      Electronically Signed: Wisam Oquendo MD    4/5/2025 9:28 AM EDT    Workstation ID: [id number]    XR Chest 1 View [[id number]] Collected: 04/04/25 0558     Updated: 04/04/25 0602    Narrative:      XR CHEST 1 VW    Date of Exam: 4/4/2025 3:46 AM EDT    Indication: Respiratory failure    Comparison: 4/3/2025    Findings:  ET tube is in the mid trachea. Heart remains enlarged. Bilateral pleural effusions are probably not significantly changed. Bilateral pulmonary infiltrates also appear largely stable. No pneumothorax is identified.      Impression:      No significant interval change.        Electronically Signed: Ricky Valverde MD    4/4/2025 5:59 AM EDT    Workstation ID: [id number]    CT Chest Without Contrast Diagnostic [[id number]] Collected: 04/03/25  1051     Updated: 04/03/25 1106    Narrative:      CT CHEST WO CONTRAST DIAGNOSTIC    Date of Exam: 4/3/2025 10:21 AM EDT    Indication: Evaluates bilateral infiltrates/atelectasis/effusions.    Comparison: Portable chest radiograph of today's date. Chest CT scan 10/20/2024.    Technique: Axial CT images were obtained of the chest without contrast administration.  Reconstructed coronal and sagittal images were also obtained. Automated exposure control and iterative construction methods were used.      Findings:  The previous chest CT scan report described patchy bibasilar disease and small pericardial effusion. Current chest radiograph notes extensive bilateral pulmonary infiltrates and some pleural effusion.    ET tube is seen in good position at the inferior margin of the clavicles. There is dense airspace consolidation of most of the lower lobes, on the right appearing as simple atelectasis, on the left, with associated multifocal airspace disease more   typical for pneumonia.     There are small left and moderate right free-flowing pleural effusions. There is diffuse groundglass perihilar disease elsewhere in a pattern that could represent pneumonia or pulmonary edema. The general enlargement the pulmonary vasculature suggests at   least some component of pulmonary vascular congestion.    Images of the mediastinum show no significant pericardial effusion and no evidence of adenopathy. There is moderate coronary calcification.    Images of the upper abdomen show percutaneous feeding tube intraluminal within the stomach, with no associated inflammation. There may be a subtle layer of noncalcified debris in the otherwise normal-appearing gallbladder, and no evidence of gallbladder   inflammation. No acute upper abdominal pathology is seen. Bony structures appear to be intact.      Impression:      Impression:    1. Dense and extensive airspace disease of both lower lobes, on the right more typical for simple  "atelectasis, on the left, suspicious for extensive left lower lobe pneumonia.    2. Prominent pulmonary vasculature and also diffuse perihilar disease favored to represent a combination of volume overload/CHF, and bilateral pneumonia.    3. Small left and moderate right free-flowing pleural effusions.    5. Incidentally noted probable gallbladder \"sludge\". No evidence of gallbladder inflammation or biliary ductal dilatation.        Electronically Signed: Wisam Oquendo MD    4/3/2025 11:03 AM EDT    Workstation ID: [id number]    XR Chest 1 View [[id number]] Collected: 04/03/25 0429     Updated: 04/03/25 0433    Narrative:      XR CHEST 1 VW    Date of Exam: 4/3/2025 12:24 AM EDT    Indication: Respiratory failure    Comparison: 4/2/2025    Findings:  Endotracheal tube is in good position. Heart remains enlarged. There are bilateral small pleural effusions, right larger than left. Diffuse bilateral pulmonary infiltrates are again seen. These are probably not significant changed. No pneumothorax.      Impression:      Diffuse bilateral pulmonary infiltrates, right greater than left, with bilateral pleural effusions, right larger than left.        Electronically Signed: Ricky Valverde MD    4/3/2025 4:30 AM EDT    Workstation ID: [id number]              Lab 03/29/25  1708   HEMOGLOBIN A1C 5.60       Diagnostics:   No valid procedures specified.    A:     Acute respiratory failure with hypoxia         Impression:   Acute hypoxic resp failure  PNA   + corona virus  MDR PSA UTI  Anemia  Vascular dementia  Acute renal failure/anuric      Symptoms:  AMS  Debility  Dyspnea       P:    Discussed with RN.  Dtr not available until later today.  If do not reach today will try again in AM. Thank you for this consult and allowing us to participate in patient's plan of care. Palliative Care Team will continue to follow patient.       Deana Buchanan MD, 4/5/2025, 12:09 EDT      "

## 2025-04-05 NOTE — PLAN OF CARE
Goal Outcome Evaluation:         NO WEANINIG OF MECHANICAL VENTILATION THIS SHIFT

## 2025-04-05 NOTE — PROGRESS NOTES
Intensive Care Follow-up     Hospital:  LOS: 7 days   Mr. Omkar Nava, 69 y.o. male is followed for:   Acute respiratory failure with hypoxia            History of present illness:    69 y.o. male with PMH vascular dementia, dysphagia status post PEG tube who presented to this Hospital from long-term care facility on 3/29/2025 because of shortness of breath.  In the ED, labs significant for hemoglobin 6.6, troponin 98, potassium 6.4, creatinine 1.5, sodium 120.  Family member noted patient has been having blood in stool per nursing facility.  Chest x-ray reviewed, bilateral opacities.  Patient placed on BiPAP for hypoxia and work of breathing.  Given hyperkalemia protocol with insulin/dextrose/Lasix/calcium/albuterol/bicarb and started on Zosyn.  ICU consulted for further care.   Patient underwent endoscopy and no other overt source of bleeding found.  Patient started to develop renal failure. Patient family discussions were held.  Family did not want to escalate care.  They did not want to withdraw care either.      Subjective   Interval History:  Overnight no acute events.  Patient remains on fentanyl infusion.  Appears comfortable.  Developing worsening renal failure with worsening hyperkalemia.               The patient's past medical, surgical and social history were reviewed and updated in Epic as appropriate.       Objective     Infusions:  fentanyl 10 mcg/mL,  mcg/hr, Last Rate: 250 mcg/hr (04/05/25 1024)  midazolam, 1-10 mg/hr, Last Rate: 5 mg/hr (04/05/25 1210)      Medications:  amLODIPine, 10 mg, Per PEG Tube, Q24H  castor oil-balsam peru, 1 Application, Topical, Q12H  cloNIDine, 0.2 mg, Per PEG Tube, Q8H  docusate sodium, 100 mg, Per PEG Tube, BID  heparin (porcine), 5,000 Units, Subcutaneous, Q8H  hydrALAZINE, 100 mg, Per PEG Tube, TID  insulin regular, 2-7 Units, Subcutaneous, Q6H  meropenem, 1,000 mg, Intravenous, Q8H  [START ON 4/27/2025] mupirocin, 1 Application, Each Nare,  "BID  pantoprazole, 40 mg, Intravenous, Q12H  terazosin, 2 mg, Per PEG Tube, Nightly  [Held by provider] Valproic Acid, 150 mg, Per PEG Tube, Q12H        Vital Sign Min/Max for last 24 hours  Temp  Min: 95 °F (35 °C)  Max: 99.1 °F (37.3 °C)   BP  Min: 105/46  Max: 141/44   Pulse  Min: 44  Max: 59   Resp  Min: 12  Max: 13   SpO2  Min: 86 %  Max: 99 %   No data recorded       Input/Output for last 24 hour shift  04/04 0701 - 04/05 0700  In: 2787.5 [I.V.:1027.1]  Out: 50 [Urine:50]   Mode: VC+/AC  FiO2 (%):  [40 %] 40 %  S RR:  [12] 12  S VT:  [470 mL] 470 mL  PEEP/CPAP (cm H2O):  [5 cm H20] 5 cm H20  MAP (cm H2O):  [9.2-14] 9.2  Objective:  Vital signs: (most recent): Blood pressure 134/53, pulse 55, temperature 99.1 °F (37.3 °C), temperature source Bladder, resp. rate 12, height 182 cm (71.65\"), weight 92.7 kg (204 lb 5.9 oz), SpO2 96%.            General Appearance:  Not in distress, no asynchrony with mechanical ventilator.  Neck: Endotracheal tube clean.   Lungs:   B/L Breath sounds present with decreased breath sounds on bases, no wheezing heard, no crackles.   Heart: S1 and S2 present, no murmur  Abdomen: Soft, nontender, no guarding or rigidity, bowel sounds positive.  Extremities: no edema, warm to touch.  Neurologic:  Pt sedated on the vent. Not able to participate in full neurological exam    Ventilator settings: A/C: FiO2 40%      Results from last 7 days   Lab Units 04/05/25  0421 04/04/25 0416 04/03/25  0405   WBC 10*3/mm3 13.38* 12.31* 11.03*   HEMOGLOBIN g/dL 8.4* 8.2* 9.4*   PLATELETS 10*3/mm3 242 244 286     Results from last 7 days   Lab Units 04/05/25  0421 04/04/25  0416 04/03/25  0405 04/02/25  0458   SODIUM mmol/L 137 139 140 138   POTASSIUM mmol/L 6.0* 4.9 4.9 4.1   CO2 mmol/L 21.0* 22.0 23.0 22.0   BUN mg/dL 85* 59* 36* 26*   CREATININE mg/dL 3.54* 2.34* 1.38* 1.28*   MAGNESIUM mg/dL  --  2.6* 2.4 2.3   PHOSPHORUS mg/dL 4.0 3.7 3.5 2.5   GLUCOSE mg/dL 120* 131* 149* 93     Estimated Creatinine " Clearance: 23.8 mL/min (A) (by C-G formula based on SCr of 3.54 mg/dL (H)).    Results from last 7 days   Lab Units 04/05/25  0329   PH, ARTERIAL pH units 7.298*   PCO2, ARTERIAL mm Hg 46.3*   PO2 ART mm Hg 82.3*       Images:   Chest x-ray reviewed personally and showed endotracheal tube in mid trachea.  Left basilar atelectasis and air bronchograms and retrocardiac opacity noted concerning for pneumonia.  Bilateral pleural effusions and pulmonary edema pattern as well.    I reviewed the patient's results and images.     Assessment & Plan   Impression        Acute respiratory failure with hypoxia    Type 2 diabetes mellitus    Dementia    Hyperkalemia       Plan        1.  Patient admitted with acute respiratory failure initially thought to be from pneumonia.  Found to have coronavirus positive on viral panel.  Eventually found to have ESBL E. coli UTI.  On treatment with meropenem currently.  WBC count slowly trending up.  2.  Remains on mechanical ventilation.  Neurologically not ready to be weaned from mechanical ventilation.  Chest x-ray is also showing worsening infiltrative process and pulmonary edema pattern.  3.  Continue antihypertensive medications with amlodipine, clonidine, terazosin and hydralazine.  4.  Continue PPI.  5.  Continue DVT prophylaxis.  6.  Remains on sedation with fentanyl and Versed as needed to keep comfortable.  7.  Renal failure which is getting worse and became anuric.  Patient has worsening renal failure with elevated BUN and creatinine.      Family discussions have been held and they do not want to pursue aggressive measures including dialysis.  Will need to have further discussions with family.  Patient is to a point where he is actively dying at this point and would not survive without more aggressive support.  Will get a palliative care team involved for further goals of care and would recommend comfort measures.  Discussed with nursing staff.    Plan of care and goals reviewed  with multidisciplinary/antibiotic stewardship team during rounds.   I discussed the patient's findings and my recommendations with nursing staff     Modesto Smith MD, FCCP  Pulmonary, Critical care and Sleep Medicine

## 2025-04-06 NOTE — PLAN OF CARE
Problem: Adult Inpatient Plan of Care  Goal: Plan of Care Review  Outcome: Not Progressing  Flowsheets (Taken 4/6/2025 0515)  Outcome Evaluation: Patient remains on MV PEEP 5, FiO2 50%. Fent @300. Versed@10. Prop@20. PRN versed and dent boluses given for vent synchrony. Afebrile. UOP: 13. BMx2. BUE restraints in place for safety. Shifting agents were given for K+ of 6.9.      Problem: Restraint, Nonviolent  Goal: Absence of Harm or Injury  Outcome: Not Progressing  Intervention: Implement Least Restrictive Safety Strategies  Recent Flowsheet Documentation  Taken 4/6/2025 0400 by Haylee Perez RN  Medical Device Protection: tubing secured  Diversional Activities: television  Taken 4/6/2025 0200 by Haylee Perez RN  Medical Device Protection:   IV pole/bag removed from visual field   tubing secured  Diversional Activities: television  Taken 4/6/2025 0159 by Haylee Perez RN  Medical Device Protection:   IV pole/bag removed from visual field   tubing secured  Diversional Activities: television  Taken 4/6/2025 0000 by Haylee Perez RN  Medical Device Protection: tubing secured  Diversional Activities: television  Taken 4/5/2025 2200 by Haylee Perez RN  Medical Device Protection: tubing secured  Diversional Activities: television  Taken 4/5/2025 2000 by Haylee Perez RN  Medical Device Protection: tubing secured  Diversional Activities: television  Intervention: Protect Dignity, Rights and Personal Wellbeing  Recent Flowsheet Documentation  Taken 4/6/2025 0400 by Haylee Perez RN  Trust Relationship/Rapport:   care explained   reassurance provided  Taken 4/6/2025 0200 by Haylee Perez RN  Trust Relationship/Rapport:   care explained   reassurance provided  Taken 4/6/2025 0000 by Haylee Perez RN  Trust Relationship/Rapport:   care explained   reassurance provided  Taken 4/5/2025 2200 by Haylee Perez RN  Trust Relationship/Rapport:   care explained   reassurance provided  Taken  4/5/2025 2000 by Haylee Perez RN  Trust Relationship/Rapport:   care explained   reassurance provided  Intervention: Protect Skin and Joint Integrity  Recent Flowsheet Documentation  Taken 4/6/2025 0400 by Haylee Perez RN  Body Position:   turned   right  Skin Protection:   drying agents applied   incontinence pads utilized   protective footwear used   silicone foam dressing in place   skin sealant/moisture barrier applied   transparent dressing maintained  Range of Motion: ROM (range of motion) performed  Taken 4/6/2025 0200 by Haylee Perez RN  Body Position:   turned   supine  Skin Protection:   drying agents applied   incontinence pads utilized   protective footwear used   silicone foam dressing in place   skin sealant/moisture barrier applied   transparent dressing maintained  Range of Motion: ROM (range of motion) performed  Taken 4/6/2025 0000 by Haylee Perez RN  Body Position:   turned   left  Skin Protection:   drying agents applied   incontinence pads utilized   protective footwear used   silicone foam dressing in place   skin sealant/moisture barrier applied   transparent dressing maintained  Range of Motion: ROM (range of motion) performed  Taken 4/5/2025 2200 by Haylee Perez RN  Body Position:   turned   supine  Skin Protection:   drying agents applied   incontinence pads utilized   protective footwear used   silicone foam dressing in place   skin sealant/moisture barrier applied   transparent dressing maintained  Taken 4/5/2025 2000 by Haylee Perez RN  Body Position:   turned   right  Skin Protection:   drying agents applied   incontinence pads utilized   protective footwear used   silicone foam dressing in place   skin sealant/moisture barrier applied   transparent dressing maintained  Range of Motion: ROM (range of motion) performed

## 2025-04-06 NOTE — PROGRESS NOTES
"Palliative Care Daily Progress Note     Referring: Modesto Smith    C/C: pt unable    S: Medical record reviewed. Events noted.  Seen x 2.  Unresponsive on first visit.  Then returned to speak with dtr and sister.     ROS: pt unable    O: Code Status:   Code Status and Medical Interventions: No CPR (Do Not Attempt to Resuscitate); Limited Support; No dialysis, No vasopressors, Other; Do NOT escalate care.   Ordered at: 04/04/25 1035     Code Status (Patient has no pulse and is not breathing):    No CPR (Do Not Attempt to Resuscitate)     Medical Interventions (Patient has pulse or is breathing):    Limited Support     Medical Intervention Limits:    No dialysis       No vasopressors       Other     Additional Medical Interventions Limits:    Do NOT escalate care.      Advanced Directives: Advance Directive Status: Patient has advance directive, copy in chart   Goals of Care: Ongoing.   Palliative Performance Scale Score:   10    /44 (BP Location: Right arm, Patient Position: Lying)   Pulse (!) 49   Temp 97.7 °F (36.5 °C) (Bladder)   Resp 12   Ht 182 cm (71.65\")   Wt 92.7 kg (204 lb 5.9 oz)   SpO2 98%   BMI 27.99 kg/m²     Intake/Output Summary (Last 24 hours) at 4/6/2025 1027  Last data filed at 4/6/2025 1008  Gross per 24 hour   Intake 3186.96 ml   Output 36 ml   Net 3150.96 ml       Physical Exam:    General Appearance:    Unresponsive on vent   HEENT:    NC/AT, EOMI, anicteric, MMM, face relaxed   Neck:   supple, trachea midline, no JVD   Lungs:     CTA bilat, diminished in bases; respirations regular, even     and unlabored    Heart:    RRR, normal S1 and S2, no M/R/G   Abdomen:     Normal bowel sounds, soft, nontender, nondistended   G/U:   Deferred   MSK/Extremities:   No clubbing , cyanosis or edema, No wasting   Pulses:   Pulses palpable and equal bilaterally   Skin:   Warm, dry, no mottling   Neurologic:   Unresponsive on vent, min withdrawal x 4, no tremor, nl     tone   Psych:   Calm     "   Current Medications:      Current Facility-Administered Medications:     albuterol (PROVENTIL) nebulizer solution 0.083% 2.5 mg/3mL, 2.5 mg, Nebulization, Q4H PRN, Salo Arellano MD, 2.5 mg at 03/31/25 1951    amLODIPine (NORVASC) tablet 10 mg, 10 mg, Per PEG Tube, Q24H, Daniela Judd, PharmD    castor oil-balsam peru (VENELEX) ointment 1 Application, 1 Application, Topical, Q12H, Salo Arellano MD, 1 Application at 04/06/25 0821    cloNIDine (CATAPRES) tablet 0.2 mg, 0.2 mg, Per PEG Tube, Q8H, Daniela Judd PharmD    dextrose (D50W) (25 g/50 mL) IV injection 25 g, 25 g, Intravenous, Q15 Min PRN, Brunner, Mark I, MD, 25 g at 03/30/25 2304    docusate sodium (COLACE) liquid 100 mg, 100 mg, Per PEG Tube, BID, Brunner, Mark I, MD, 100 mg at 04/04/25 0910    fentaNYL (SUBLIMAZE) bolus from bag 10 mcg/mL injection 50 mcg, 50 mcg, Intravenous, Q30 Min PRN, Salo Arellano MD, 50 mcg at 04/06/25 0140    fentaNYL 2500 mcg/250 mL NS infusion,  mcg/hr, Intravenous, Titrated, Modesto Smith MD, Last Rate: 30 mL/hr at 04/06/25 0623, 300 mcg/hr at 04/06/25 0623    glucagon (GLUCAGEN) injection 1 mg, 1 mg, Intramuscular, Q15 Min PRN, Brunner, Mark I, MD    heparin (porcine) 5000 UNIT/ML injection 5,000 Units, 5,000 Units, Subcutaneous, Q8H, Salo Arellano MD, 5,000 Units at 04/06/25 0611    hydrALAZINE (APRESOLINE) tablet 100 mg, 100 mg, Per PEG Tube, TID, Daniela Judd PharmD, 100 mg at 04/05/25 1630    insulin regular (humuLIN R,novoLIN R) injection 2-7 Units, 2-7 Units, Subcutaneous, Q6H, Brunner, Mark I, MD, 2 Units at 04/04/25 1311    midazolam (VERSED) 100 mg in 100mL NS infusion, 1-10 mg/hr, Intravenous, Titrated, Modesto Smith MD, Last Rate: 4 mL/hr at 04/06/25 0632, 4 mg/hr at 04/06/25 0632    midazolam (VERSED) bolus from bag 1 mg/mL solution 2.5 mg, 2.5 mg, Intravenous, Q30 Min PRN, Modesto Smith MD, 2.5 mg at 04/06/25 0128    [START ON 4/27/2025] mupirocin (BACTROBAN) 2 % nasal ointment 1  Application, 1 Application, Each Nare, BID, Brunner, Mark I, MD    pantoprazole (PROTONIX) injection 40 mg, 40 mg, Intravenous, Q12H, Brunner, Mark I, MD, 40 mg at 04/06/25 0809    Potassium Replacement - Follow Nurse / BPA Driven Protocol, , Not Applicable, PRN, Salo Arellano MD    propofol (DIPRIVAN) infusion 10 mg/mL 100 mL, 5-50 mcg/kg/min, Intravenous, Titrated, Melania Cat APRN, Last Rate: 13.91 mL/hr at 04/06/25 0725, 25 mcg/kg/min at 04/06/25 0725    sodium chloride 0.9 % flush 10 mL, 10 mL, Intravenous, PRN, Brunner, Mark I, MD    terazosin (HYTRIN) capsule 2 mg, 2 mg, Per PEG Tube, Nightly, Salo Arellano MD, 2 mg at 04/05/25 2045    [Held by provider] Valproic Acid (DEPAKENE) syrup 150 mg, 150 mg, Per PEG Tube, Q12H, Brunner, Mark I, MD, 150 mg at 04/03/25 0801     Labs:   Results from last 7 days   Lab Units 04/06/25  0435   WBC 10*3/mm3 10.74   HEMOGLOBIN g/dL 8.0*   HEMATOCRIT % 27.6*   PLATELETS 10*3/mm3 241     Results from last 7 days   Lab Units 04/06/25  0435 04/01/25  0422 03/31/25  0435   SODIUM mmol/L 133*   < > 129*   POTASSIUM mmol/L 6.7*   < > 4.3   CHLORIDE mmol/L 102   < > 95*   CO2 mmol/L 17.0*   < > 20.0*   BUN mg/dL 111*   < > 35*   CREATININE mg/dL 4.82*   < > 1.68*   CALCIUM mg/dL 9.1   < > 8.5*   BILIRUBIN mg/dL  --   --  0.6   ALK PHOS U/L  --   --  121*   ALT (SGPT) U/L  --   --  27   AST (SGOT) U/L  --   --  31   GLUCOSE mg/dL 125*   < > 97    < > = values in this interval not displayed.     Imaging Results (Last 72 Hours)       Procedure Component Value Units Date/Time    XR Chest 1 View [[id number]] Collected: 04/06/25 0809     Updated: 04/06/25 0814    Narrative:      XR CHEST 1 VW    Date of Exam: 4/6/2025 3:43 AM EDT    Indication: Respiratory failure    Comparison: 4/5/2025    Findings:  Endotracheal tube unchanged. Indistinctness of the peripheral pulmonary. Bibasilar opacities with poor visualization of the diaphragms. No pneumothorax. No new pulmonary  abnormality      Impression:      Impression:  Grossly stable chest. Findings suggest mild interstitial edema and bilateral pleural effusions with bibasilar atelectasis      Electronically Signed: Sarkis Charo    4/6/2025 8:11 AM EDT    Workstation ID: [id number]    XR Chest 1 View [[id number]] Collected: 04/05/25 0925     Updated: 04/05/25 0931    Narrative:      XR CHEST 1 VW    Date of Exam: 4/5/2025 5:22 AM EDT    Indication: Respiratory failure    Comparison: 4/4/2025    Findings:  ET tube is in good position below the clavicles. Lung volumes are relatively low. Heart shadow is normal in size. Diffuse interstitial disease pattern is similar to the prior study allowing for lower lung volumes today. There is probably a small left   effusion. No pneumothorax is identified.      Impression:      Impression:  Extensive but overall probably stable pulmonary disease pattern, more typical for bilateral pneumonia than pulmonary edema.      Electronically Signed: Wisam Oquendo MD    4/5/2025 9:28 AM EDT    Workstation ID: [id number]    XR Chest 1 View [[id number]] Collected: 04/04/25 0558     Updated: 04/04/25 0602    Narrative:      XR CHEST 1 VW    Date of Exam: 4/4/2025 3:46 AM EDT    Indication: Respiratory failure    Comparison: 4/3/2025    Findings:  ET tube is in the mid trachea. Heart remains enlarged. Bilateral pleural effusions are probably not significantly changed. Bilateral pulmonary infiltrates also appear largely stable. No pneumothorax is identified.      Impression:      No significant interval change.        Electronically Signed: Ricky Valverde MD    4/4/2025 5:59 AM EDT    Workstation ID: [id number]    CT Chest Without Contrast Diagnostic [[id number]] Collected: 04/03/25 1051     Updated: 04/03/25 1106    Narrative:      CT CHEST WO CONTRAST DIAGNOSTIC    Date of Exam: 4/3/2025 10:21 AM EDT    Indication: Evaluates bilateral infiltrates/atelectasis/effusions.    Comparison: Portable chest radiograph of today's  date. Chest CT scan 10/20/2024.    Technique: Axial CT images were obtained of the chest without contrast administration.  Reconstructed coronal and sagittal images were also obtained. Automated exposure control and iterative construction methods were used.      Findings:  The previous chest CT scan report described patchy bibasilar disease and small pericardial effusion. Current chest radiograph notes extensive bilateral pulmonary infiltrates and some pleural effusion.    ET tube is seen in good position at the inferior margin of the clavicles. There is dense airspace consolidation of most of the lower lobes, on the right appearing as simple atelectasis, on the left, with associated multifocal airspace disease more   typical for pneumonia.     There are small left and moderate right free-flowing pleural effusions. There is diffuse groundglass perihilar disease elsewhere in a pattern that could represent pneumonia or pulmonary edema. The general enlargement the pulmonary vasculature suggests at   least some component of pulmonary vascular congestion.    Images of the mediastinum show no significant pericardial effusion and no evidence of adenopathy. There is moderate coronary calcification.    Images of the upper abdomen show percutaneous feeding tube intraluminal within the stomach, with no associated inflammation. There may be a subtle layer of noncalcified debris in the otherwise normal-appearing gallbladder, and no evidence of gallbladder   inflammation. No acute upper abdominal pathology is seen. Bony structures appear to be intact.      Impression:      Impression:    1. Dense and extensive airspace disease of both lower lobes, on the right more typical for simple atelectasis, on the left, suspicious for extensive left lower lobe pneumonia.    2. Prominent pulmonary vasculature and also diffuse perihilar disease favored to represent a combination of volume overload/CHF, and bilateral pneumonia.    3. Small  "left and moderate right free-flowing pleural effusions.    5. Incidentally noted probable gallbladder \"sludge\". No evidence of gallbladder inflammation or biliary ductal dilatation.        Electronically Signed: Wisam Oquendo MD    4/3/2025 11:03 AM EDT    Workstation ID: [id number]                  Diagnostics: Reviewed    A:     Acute respiratory failure with hypoxia    Type 2 diabetes mellitus    Dementia    Hyperkalemia       Impression:  Acute hypoxic resp failure  PNA   + corona virus  MDR PSA UTI  Anemia  Vascular dementia  Acute renal failure/anuric    Symptoms:   AMS  Debility  Dyspnea    P:   Met with dtr and sister in room.  Reviewed recent events and ongoing decline. Confirmed decision to pursue comfort measures with compassionate extubation.  Discussed with RN.  Intensivist updated.  Orders adjusted consistent with same. Palliative Care Team will continue to follow patient.     Deana Buchanan MD, 4/6/2025, 10:27 EDT     "

## 2025-04-06 NOTE — DISCHARGE SUMMARY
Death Summary    Patient name: Omkar Nava  CSN: [id number]  MRN: [id number]  : 1955  Today's date: 2025     Date of Admission: 3/29/2025  Date and Time of Death:  2025    Admitting Physician:  Salo Arellano MD     Primary Care Provider: Alberto Dye MD    Consultations:   NARGIS García  Gastroenterology   Deana Buchanan MD  Palliative Care       Admission Diagnosis: Acute respiratory failure with hypoxia      Diagnoses at the Time of Death:     Acute respiratory failure with hypoxia    Type 2 diabetes mellitus    Dementia    Hyperkalemia      Procedures:  Procedure(s):  ESOPHAGOGASTRODUODENOSCOPY       History of Present Illness:  Omkar is a 69 y.o. male with PMH vascular dementia, dysphagia status post PEG tube who presented to this Hospital from long-term care facility on 3/29/2025 because of shortness of breath.  In the ED, labs significant for hemoglobin 6.6, troponin 98, potassium 6.4, creatinine 1.5, sodium 120.  Family member noted patient has been having blood in stool per nursing facility.  Chest x-ray reviewed, bilateral opacities.  Patient placed on BiPAP for hypoxia and work of breathing.  Given hyperkalemia protocol with insulin/dextrose/Lasix/calcium/albuterol/bicarb and started on Zosyn.  ICU consulted for further care. (End of copied text).     Hospital Course:  Patient failed Bipap and was intubated on 3/29/25. He underwent EGD on 3/30/25. No overt source of bleeding identified. Respiratory PCR was positive for coronavirus. He was also found to be positive for ESBL E.Coli UTI. He completed a course of meropenem and WBC improved. Patient developed worsening renal failure and worsening hyperkalemia. Family chose not to escalate care. Palliative Care was consulted on 25. Family made decision to withdraw care and patient was palliatively extubated on 25. Time of death 1358.        Delia Amador PA-C   Pulmonary and Critical Care Medicine   25 14:10  EDT    Electronically signed by Delia Amador PA-C, 04/06/25, 2:21 PM EDT.     Please note that portions of this note were completed with a voice recognition program.

## 2025-04-06 NOTE — SIGNIFICANT NOTE
Exam confirms with auscultation zero audible heart tones and zero audible respirations. Mr.David RALF Nava was pronounced dead at 1400, 04/06/2025.  MD notified by Patient's RN.    Omkar Wilson RN  Clinical House Supervisor  4/6/2025 16:17 EDT

## 2025-04-06 NOTE — PROGRESS NOTES
Intensive Care Follow-up     Hospital:  LOS: 8 days   Mr. Omkar Nava, 69 y.o. male is followed for:   Acute respiratory failure with hypoxia            History of present illness:    69 y.o. male with PMH vascular dementia, dysphagia status post PEG tube who presented to this Hospital from long-term care facility on 3/29/2025 because of shortness of breath.  In the ED, labs significant for hemoglobin 6.6, troponin 98, potassium 6.4, creatinine 1.5, sodium 120.  Family member noted patient has been having blood in stool per nursing facility.  Chest x-ray reviewed, bilateral opacities.  Patient placed on BiPAP for hypoxia and work of breathing.  Given hyperkalemia protocol with insulin/dextrose/Lasix/calcium/albuterol/bicarb and started on Zosyn.  ICU consulted for further care.   Patient underwent endoscopy and no other overt source of bleeding found.  Patient started to develop renal failure. Patient family discussions were held.  Family did not want to escalate care.  They did not want to withdraw care either.      Subjective   Interval History:  Overnight  Worsening renal failure.  Worsening hyperkalemia.  Got shifting agents overnight.  Family is contemplating terminal extubation today.             The patient's past medical, surgical and social history were reviewed and updated in Epic as appropriate.       Objective     Infusions:  fentanyl 10 mcg/mL,  mcg/hr, Last Rate: 300 mcg/hr (04/06/25 0623)  midazolam, 1-10 mg/hr, Last Rate: 4 mg/hr (04/06/25 0632)  propofol, 5-50 mcg/kg/min, Last Rate: 25 mcg/kg/min (04/06/25 0725)      Medications:  amLODIPine, 10 mg, Per PEG Tube, Q24H  castor oil-balsam peru, 1 Application, Topical, Q12H  cloNIDine, 0.2 mg, Per PEG Tube, Q8H  docusate sodium, 100 mg, Per PEG Tube, BID  heparin (porcine), 5,000 Units, Subcutaneous, Q8H  hydrALAZINE, 100 mg, Per PEG Tube, TID  insulin regular, 2-7 Units, Subcutaneous, Q6H  [START ON 4/27/2025] mupirocin, 1 Application, Each  "Nare, BID  pantoprazole, 40 mg, Intravenous, Q12H  terazosin, 2 mg, Per PEG Tube, Nightly  [Held by provider] Valproic Acid, 150 mg, Per PEG Tube, Q12H        Vital Sign Min/Max for last 24 hours  Temp  Min: 97 °F (36.1 °C)  Max: 99.1 °F (37.3 °C)   BP  Min: 106/50  Max: 142/61   Pulse  Min: 49  Max: 64   Resp  Min: 12  Max: 19   SpO2  Min: 87 %  Max: 99 %   No data recorded       Input/Output for last 24 hour shift  04/05 0701 - 04/06 0700  In: 3301.2 [I.V.:1039]  Out: 35 [Urine:35]   Mode: VC+/AC  FiO2 (%):  [40 %-50 %] 50 %  S RR:  [12] 12  S VT:  [470 mL] 470 mL  PEEP/CPAP (cm H2O):  [5 cm H20] 5 cm H20  MAP (cm H2O):  [9.2-17] 11  Objective:  Vital signs: (most recent): Blood pressure 117/44, pulse (!) 49, temperature 97.7 °F (36.5 °C), temperature source Bladder, resp. rate 12, height 182 cm (71.65\"), weight 92.7 kg (204 lb 5.9 oz), SpO2 98%.            General Appearance:  Not in distress, no asynchrony with mechanical ventilator.  Neck: Endotracheal tube clean.   Lungs:   B/L Breath sounds present with decreased breath sounds on bases, no wheezing heard, no crackles.   Heart: S1 and S2 present, no murmur  Abdomen: Soft, nontender, no guarding or rigidity, bowel sounds positive.  Extremities: no edema, warm to touch.  Neurologic:  Pt sedated on the vent. Not able to participate in full neurological exam    Ventilator settings: A/C: FiO2 50%      Results from last 7 days   Lab Units 04/06/25 0435 04/05/25 0421 04/04/25 0416   WBC 10*3/mm3 10.74 13.38* 12.31*   HEMOGLOBIN g/dL 8.0* 8.4* 8.2*   PLATELETS 10*3/mm3 241 242 244     Results from last 7 days   Lab Units 04/06/25 0435 04/05/25 0421 04/04/25 0416 04/03/25  0405 04/02/25  0458   SODIUM mmol/L 133* 137 139 140 138   POTASSIUM mmol/L 6.7* 6.0* 4.9 4.9 4.1   CO2 mmol/L 17.0* 21.0* 22.0 23.0 22.0   BUN mg/dL 111* 85* 59* 36* 26*   CREATININE mg/dL 4.82* 3.54* 2.34* 1.38* 1.28*   MAGNESIUM mg/dL  --   --  2.6* 2.4 2.3   PHOSPHORUS mg/dL 4.4 4.0 3.7 3.5 " 2.5   GLUCOSE mg/dL 125* 120* 131* 149* 93     Estimated Creatinine Clearance: 17.5 mL/min (A) (by C-G formula based on SCr of 4.82 mg/dL (H)).    Results from last 7 days   Lab Units 04/06/25  0319   PH, ARTERIAL pH units 7.277*   PCO2, ARTERIAL mm Hg 45.7*   PO2 ART mm Hg 79.5*       Images:       I reviewed the patient's results and images.     Assessment & Plan   Impression        Acute respiratory failure with hypoxia    Type 2 diabetes mellitus    Dementia    Hyperkalemia       Plan        1.  Patient admitted with acute respiratory failure initially thought to be from pneumonia.  Found to have coronavirus positive on viral panel.  Eventually found to have ESBL E. coli UTI.  Completed meropenem.  WBC count improving.  2.  Remains on mechanical ventilation.  Neurologically not ready to be weaned from mechanical ventilation.  Chest x-ray is also showing worsening infiltrative process and pulmonary edema pattern.  3.  Continue antihypertensive medications with amlodipine, clonidine, terazosin and hydralazine.  4.  Continue PPI.  5.  Continue DVT prophylaxis.  6.  Remains on sedation with fentanyl and Versed as needed to keep comfortable.  7.  patient with worsening renal failure.  Her worsening hyperkalemia.  Family has decided no escalation of care.  Hemodialysis.  We are giving shifting agents but likely will not be helpful.     We have discussed comfort measures with family.  They are wanting to proceed with that.  Palliative care team is following.  Hopefully terminal extubation later on today.    Plan of care and goals reviewed with multidisciplinary/antibiotic stewardship team during rounds.   I discussed the patient's findings and my recommendations with nursing staff     Modesto Smith MD, [facility]  Pulmonary, Critical care and Sleep Medicine

## 2025-04-08 LAB
QT INTERVAL: 382 MS
QTC INTERVAL: 403 MS

## 2025-04-12 LAB — FUNGUS WND CULT: ABNORMAL

## 2025-04-26 LAB — FUNGUS WND CULT: ABNORMAL

## 2025-05-12 LAB
MYCOBACTERIUM SPEC CULT: NORMAL
NIGHT BLUE STAIN TISS: NORMAL

## (undated) DEVICE — GLOVE ORANGE PI 8   MSG9080

## (undated) DEVICE — PK EXTREM LOWR 10

## (undated) DEVICE — PODIATRY: Brand: MEDLINE INDUSTRIES, INC.

## (undated) DEVICE — SPNG GZ WOVN 4X4IN 12PLY 10/BX STRL

## (undated) DEVICE — SUT ETHLN 3/0 PC5 18IN 1893G

## (undated) DEVICE — 1010 S-DRAPE TOWEL DRAPE 10/BX: Brand: STERI-DRAPE™

## (undated) DEVICE — INTRO ACCSR BLNT TP

## (undated) DEVICE — SOL IRR H2O BO 1000ML STRL

## (undated) DEVICE — DRAPE,TOP,102X53,STERILE: Brand: MEDLINE

## (undated) DEVICE — CONTN GRAD MEAS TRIANG 32OZ BLK

## (undated) DEVICE — SPNG ENDO BEDSIDE TUB ENZYM

## (undated) DEVICE — GUARDIAN LVC: Brand: GUARDIAN

## (undated) DEVICE — TBG PENCL TELESCP MEGADYNE SMOKE EVAC 10FT

## (undated) DEVICE — CONTAINER,SPECIMEN,OR STERILE,4OZ: Brand: MEDLINE

## (undated) DEVICE — TRAP FLD MINIVAC MEGADYNE 100ML

## (undated) DEVICE — ST LINER SAFECAP GRN RED CP STRL

## (undated) DEVICE — INTENDED FOR TISSUE SEPARATION, AND OTHER PROCEDURES THAT REQUIRE A SHARP SURGICAL BLADE TO PUNCTURE OR CUT.: Brand: BARD-PARKER ® CARBON RIB-BACK BLADES

## (undated) DEVICE — TUBING, SUCTION, 1/4" X 10', STRAIGHT: Brand: MEDLINE

## (undated) DEVICE — FIRST STEP BEDSIDE ADD WATER KIT - RESEALABLE STAND-UP POUCH, ENDOSCOPIC CLEANING PAD - 1 POUCH: Brand: FIRST STEP BEDSIDE ADD WATER KIT - RESEALABLE STAND-UP POUCH, ENDOSCOPIC CLEANIN

## (undated) DEVICE — DRSNG TELFA PAD NONADH STR 1S 3X8IN

## (undated) DEVICE — ANTIBACTERIAL UNDYED BRAIDED (POLYGLACTIN 910), SYNTHETIC ABSORBABLE SUTURE: Brand: COATED VICRYL

## (undated) DEVICE — INTENDED FOR TISSUE SEPARATION, AND OTHER PROCEDURES THAT REQUIRE A SHARP SURGICAL BLADE TO PUNCTURE OR CUT.: Brand: BARD-PARKER ® SAFETYLOCK CARBON RIB-BACK BLADES

## (undated) DEVICE — Device

## (undated) DEVICE — APPL CHLORAPREP TINTED 26ML TEAL

## (undated) DEVICE — E-Z CLEAN, NON-STICK, PTFE COATED, ELECTROSURGICAL BLADE ELECTRODE, 2.5 INCH (6.35 CM): Brand: EZ CLEAN

## (undated) DEVICE — SYRINGE MED 10ML TRNSLUC BRL PLUNG BLK MRK POLYPR CTRL

## (undated) DEVICE — COVER LT HNDL BLU PLAS

## (undated) DEVICE — SOLUTION IRRIG 3000ML 0.9% SOD CHL USP UROMATIC PLAS CONT

## (undated) DEVICE — HANDPIECE SUCTION TUBING INTERPULSE 10FT

## (undated) DEVICE — BNDG ELAS CO-FLEX SLF ADHR 4IN5YD LF STRL

## (undated) DEVICE — SOL IRR H2O BTL 1000ML STRL

## (undated) DEVICE — THE BITE BLOCK MAXI, LATEX FREE STRAP IS USED TO PROTECT THE ENDOSCOPE INSERTION TUBE FROM BEING BITTEN BY THE PATIENT.

## (undated) DEVICE — PAD ARMBRD SURG CONVOL 7.5X20X2IN

## (undated) DEVICE — SUT MNCRYL 2/0 SH 27IN Y417H

## (undated) DEVICE — DELFI MATCHING LIMB PROTECTION SLEEVES (MLPS) HELP PROTECT THE PATIENT’S LIMB FROM POSSIBLE WRINKLING, PINCHING AND SHEARING OF SKIN AND SOFT TISSUES OF THE LIMB.EACH DELFI MATCHING LIMB PROTECTION SLEEVE IS INTENDED FOR USE WITH A SPECIFIC DELFI TOURNIQUET CUFF. THIS SLEEVE IS SPECIFICALLY FOR THIGH.: Brand: MATCHING LIMB PROTECTION SLEEVES - VARI-FIT

## (undated) DEVICE — BNDG ELAS W/CLIP 6IN 10YD LF STRL

## (undated) DEVICE — HYBRID CO2 TUBING/CAP SET FOR OLYMPUS® SCOPES & CO2 SOURCE: Brand: ERBE

## (undated) DEVICE — TOWEL,STOP FLAG GOLD N-W: Brand: MEDLINE

## (undated) DEVICE — SPLNT ORTHOGLASS UNPAD P/C 4X30IN

## (undated) DEVICE — 450 ML BOTTLE OF 0.05% CHLORHEXIDINE GLUCONATE IN 99.95% STERILE WATER FOR IRRIGATION, USP AND APPLICATOR.: Brand: IRRISEPT ANTIMICROBIAL WOUND LAVAGE

## (undated) DEVICE — TUBING,OXYGEN,CRUSH RES,7',CLEAR,UC: Brand: MEDLINE INDUSTRIES, INC.

## (undated) DEVICE — UNDERCAST PADDING: Brand: DEROYAL

## (undated) DEVICE — PATIENT RETURN ELECTRODE, SINGLE-USE, CONTACT QUALITY MONITORING, ADULT, WITH 9FT CORD, FOR PATIENTS WEIGING OVER 33LBS. (15KG): Brand: MEGADYNE

## (undated) DEVICE — PRECISION (12.0 X 0.51 X 34.5MM)

## (undated) DEVICE — NASO-JEJUNAL FEEDING TUBE WITH ENFIT CONNECTION: Brand: COVIDIEN

## (undated) DEVICE — KT GASTROSTOMY TB PERCUTANEOUS 20F

## (undated) DEVICE — KT ORCA ORCAPOD DISP STRL

## (undated) DEVICE — SYR LUERLOK 50ML

## (undated) DEVICE — GAUZE,SPONGE,2X2,4PLY,NS,NW,LF: Brand: MEDLINE

## (undated) DEVICE — SMALL TEAR CROSS CUT RASP (11.0 X 5.0MM)

## (undated) DEVICE — GW NAVIPRO HYDRPHLC STR STFF .035/.089MM

## (undated) DEVICE — STANDARD HYPODERMIC NEEDLE,POLYPROPYLENE HUB: Brand: MONOJECT

## (undated) DEVICE — AIR/WATER CLEANING VALVES: Brand: AIR/WATER CLEANING VALVES

## (undated) DEVICE — SUTURE VCRL SZ 4-0 L18IN ABSRB UD L13MM PC-1 3/8 CIR PRIM J835G

## (undated) DEVICE — TOWEL,OR,DSP,ST,BLUE,DLX,8/PK,10PK/CS: Brand: MEDLINE

## (undated) DEVICE — SOLIDIFIER LIQ PREMISORB 1500CC

## (undated) DEVICE — SUT SILK 2/0 TIES 18IN A185H

## (undated) DEVICE — SUT PDS MONO 0 36 CT1 VIL PDP346H

## (undated) DEVICE — HANDPIECE SET WITH HIGH FLOW TIP AND SUCTION TUBE: Brand: INTERPULSE

## (undated) DEVICE — LUBE JELLY FOIL PACK 1.4 OZ: Brand: MEDLINE INDUSTRIES, INC.

## (undated) DEVICE — MSK ENDO PORT O2 POM ELITE CURAPLEX A/

## (undated) DEVICE — SINGLE-USE POLYPECTOMY SNARE: Brand: CAPTIVATOR II

## (undated) DEVICE — HLDR TBG NG AMT BRIDLE PRO 12F

## (undated) DEVICE — SNAR POLYP CAPTIVATOR MICROHEX 13 240CM

## (undated) DEVICE — SUT PDS 2/0 CT2 27IN VIL PDP333H

## (undated) DEVICE — COAXIAL HIGH FLOW TIP WITH SOFT SHIELD

## (undated) DEVICE — ZIMMER® STERILE DISPOSABLE TOURNIQUET CUFF WITH PLC, DUAL PORT, DUAL BLADDER, 18 IN. (46 CM)

## (undated) DEVICE — HIGH FLOW TIP